# Patient Record
Sex: MALE | Race: BLACK OR AFRICAN AMERICAN | Employment: OTHER | URBAN - METROPOLITAN AREA
[De-identification: names, ages, dates, MRNs, and addresses within clinical notes are randomized per-mention and may not be internally consistent; named-entity substitution may affect disease eponyms.]

---

## 2017-01-22 PROBLEM — R80.9 MICROALBUMINURIA: Status: ACTIVE | Noted: 2017-01-22

## 2017-02-08 DIAGNOSIS — M54.40 CHRONIC LOW BACK PAIN WITH SCIATICA, SCIATICA LATERALITY UNSPECIFIED, UNSPECIFIED BACK PAIN LATERALITY: ICD-10-CM

## 2017-02-08 DIAGNOSIS — G89.29 CHRONIC LOW BACK PAIN WITH SCIATICA, SCIATICA LATERALITY UNSPECIFIED, UNSPECIFIED BACK PAIN LATERALITY: ICD-10-CM

## 2017-02-08 DIAGNOSIS — M48.061 SPINAL STENOSIS OF LUMBAR REGION: ICD-10-CM

## 2017-02-08 RX ORDER — GABAPENTIN 600 MG/1
TABLET ORAL
Qty: 90 TABLET | Refills: 2 | Status: SHIPPED | OUTPATIENT
Start: 2017-02-08 | End: 2017-05-01 | Stop reason: SDUPTHER

## 2017-03-03 ENCOUNTER — TELEPHONE (OUTPATIENT)
Dept: INTERNAL MEDICINE CLINIC | Age: 67
End: 2017-03-03

## 2017-07-06 PROBLEM — I96 NECROSIS OF TOE (HCC): Status: ACTIVE | Noted: 2017-07-06

## 2017-07-11 ENCOUNTER — TELEPHONE (OUTPATIENT)
Dept: CARDIOLOGY CLINIC | Age: 67
End: 2017-07-11

## 2017-07-11 ENCOUNTER — HOSPITAL ENCOUNTER (OUTPATIENT)
Dept: GENERAL RADIOLOGY | Age: 67
Discharge: HOME | End: 2017-07-11
Attending: INTERNAL MEDICINE | Admitting: INTERNAL MEDICINE

## 2017-07-11 DIAGNOSIS — Z01.811 PRE-OP CHEST EXAM: ICD-10-CM

## 2017-07-11 LAB
ANION GAP SERPL CALCULATED.3IONS-SCNC: 10 MMOL/L (ref 4–16)
APTT: 34.1 SECONDS (ref 21.2–33)
BUN BLDV-MCNC: 27 MG/DL (ref 6–23)
CALCIUM SERPL-MCNC: 8.4 MG/DL (ref 8.3–10.6)
CHLORIDE BLD-SCNC: 95 MMOL/L (ref 99–110)
CO2: 32 MMOL/L (ref 21–32)
CREAT SERPL-MCNC: 1.4 MG/DL (ref 0.9–1.3)
GFR AFRICAN AMERICAN: >60 ML/MIN/1.73M2
GFR NON-AFRICAN AMERICAN: 51 ML/MIN/1.73M2
GLUCOSE BLD-MCNC: 189 MG/DL (ref 70–140)
HCT VFR BLD CALC: 32.9 % (ref 42–52)
HEMOGLOBIN: 10.7 GM/DL (ref 13.5–18)
INR BLD: 1 INDEX
MCH RBC QN AUTO: 30.5 PG (ref 27–31)
MCHC RBC AUTO-ENTMCNC: 32.5 % (ref 32–36)
MCV RBC AUTO: 93.7 FL (ref 78–100)
PDW BLD-RTO: 12.8 % (ref 11.7–14.9)
PLATELET # BLD: 238 K/CU MM (ref 140–440)
PMV BLD AUTO: 9.9 FL (ref 7.5–11.1)
POTASSIUM SERPL-SCNC: 4.1 MMOL/L (ref 3.5–5.1)
PROTHROMBIN TIME: 11.4 SECONDS (ref 9.12–12.5)
RBC # BLD: 3.51 M/CU MM (ref 4.6–6.2)
SODIUM BLD-SCNC: 137 MMOL/L (ref 135–145)
WBC # BLD: 9.3 K/CU MM (ref 4–10.5)

## 2017-07-18 RX ORDER — CLOPIDOGREL BISULFATE 75 MG/1
75 TABLET ORAL DAILY
Qty: 30 TABLET | Refills: 11 | Status: SHIPPED | OUTPATIENT
Start: 2017-07-18

## 2017-07-25 ENCOUNTER — OFFICE VISIT (OUTPATIENT)
Dept: CARDIOLOGY CLINIC | Age: 67
End: 2017-07-25

## 2017-07-25 VITALS
DIASTOLIC BLOOD PRESSURE: 80 MMHG | HEART RATE: 64 BPM | BODY MASS INDEX: 35.89 KG/M2 | HEIGHT: 72 IN | WEIGHT: 265 LBS | SYSTOLIC BLOOD PRESSURE: 130 MMHG

## 2017-07-25 DIAGNOSIS — I73.9 PAD (PERIPHERAL ARTERY DISEASE) (HCC): Primary | ICD-10-CM

## 2017-07-25 PROCEDURE — 99214 OFFICE O/P EST MOD 30 MIN: CPT | Performed by: INTERNAL MEDICINE

## 2017-07-26 ENCOUNTER — OFFICE VISIT (OUTPATIENT)
Dept: BARIATRICS/WEIGHT MGMT | Age: 67
End: 2017-07-26

## 2017-07-26 VITALS
SYSTOLIC BLOOD PRESSURE: 145 MMHG | HEART RATE: 61 BPM | HEIGHT: 72 IN | DIASTOLIC BLOOD PRESSURE: 90 MMHG | WEIGHT: 260 LBS | RESPIRATION RATE: 20 BRPM | BODY MASS INDEX: 35.21 KG/M2

## 2017-07-26 DIAGNOSIS — I73.9 PVD (PERIPHERAL VASCULAR DISEASE) (HCC): Primary | ICD-10-CM

## 2017-07-26 DIAGNOSIS — I96 TOE GANGRENE (HCC): ICD-10-CM

## 2017-07-26 PROCEDURE — 99214 OFFICE O/P EST MOD 30 MIN: CPT | Performed by: SURGERY

## 2017-07-26 RX ORDER — TRAMADOL HYDROCHLORIDE 50 MG/1
50 TABLET ORAL EVERY 6 HOURS PRN
Qty: 35 TABLET | Refills: 0 | Status: SHIPPED | OUTPATIENT
Start: 2017-07-26 | End: 2017-08-05

## 2017-07-26 ASSESSMENT — ENCOUNTER SYMPTOMS
COUGH: 0
VOMITING: 0
DIARRHEA: 0
TROUBLE SWALLOWING: 0
BLOOD IN STOOL: 0
ABDOMINAL PAIN: 0
VOICE CHANGE: 0
ANAL BLEEDING: 0
WHEEZING: 0
SHORTNESS OF BREATH: 0
CONSTIPATION: 0
NAUSEA: 0
SORE THROAT: 0
COLOR CHANGE: 1
PHOTOPHOBIA: 0

## 2017-09-07 ENCOUNTER — OFFICE VISIT (OUTPATIENT)
Dept: SURGERY | Age: 67
End: 2017-09-07

## 2017-09-07 ENCOUNTER — TELEPHONE (OUTPATIENT)
Dept: SURGERY | Age: 67
End: 2017-09-07

## 2017-09-07 VITALS
BODY MASS INDEX: 36.43 KG/M2 | WEIGHT: 268.96 LBS | SYSTOLIC BLOOD PRESSURE: 136 MMHG | DIASTOLIC BLOOD PRESSURE: 81 MMHG | HEART RATE: 89 BPM | HEIGHT: 72 IN

## 2017-09-07 DIAGNOSIS — E11.52 DIABETIC WET GANGRENE OF THE FOOT (HCC): Primary | ICD-10-CM

## 2017-09-07 PROCEDURE — 99214 OFFICE O/P EST MOD 30 MIN: CPT | Performed by: SURGERY

## 2017-09-07 ASSESSMENT — ENCOUNTER SYMPTOMS
RECTAL PAIN: 0
EYE ITCHING: 0
CHOKING: 0
CONSTIPATION: 0
APNEA: 0
BACK PAIN: 0
ANAL BLEEDING: 0
EYE REDNESS: 0
COLOR CHANGE: 0
STRIDOR: 0
SORE THROAT: 0
PHOTOPHOBIA: 0

## 2017-09-08 ENCOUNTER — TELEPHONE (OUTPATIENT)
Dept: CARDIOLOGY CLINIC | Age: 67
End: 2017-09-08

## 2017-09-11 ENCOUNTER — TELEPHONE (OUTPATIENT)
Dept: CARDIOLOGY CLINIC | Age: 67
End: 2017-09-11

## 2017-09-11 ENCOUNTER — HOSPITAL ENCOUNTER (OUTPATIENT)
Dept: PREADMISSION TESTING | Age: 67
Discharge: HOME | End: 2017-09-11
Attending: SURGERY | Admitting: SURGERY

## 2017-09-11 ENCOUNTER — PROCEDURE VISIT (OUTPATIENT)
Dept: CARDIOLOGY CLINIC | Age: 67
End: 2017-09-11

## 2017-09-11 VITALS — DIASTOLIC BLOOD PRESSURE: 76 MMHG | HEART RATE: 62 BPM | SYSTOLIC BLOOD PRESSURE: 134 MMHG

## 2017-09-11 VITALS
RESPIRATION RATE: 18 BRPM | TEMPERATURE: 97.9 F | DIASTOLIC BLOOD PRESSURE: 72 MMHG | HEART RATE: 70 BPM | BODY MASS INDEX: 35.65 KG/M2 | SYSTOLIC BLOOD PRESSURE: 152 MMHG | WEIGHT: 269 LBS | OXYGEN SATURATION: 95 % | HEIGHT: 73 IN

## 2017-09-11 DIAGNOSIS — Z95.810 ICD (IMPLANTABLE CARDIOVERTER-DEFIBRILLATOR), DUAL, IN SITU: Primary | ICD-10-CM

## 2017-09-11 LAB
ANION GAP SERPL CALCULATED.3IONS-SCNC: 10 MMOL/L (ref 4–16)
BUN BLDV-MCNC: 14 MG/DL (ref 6–23)
CALCIUM SERPL-MCNC: 8.7 MG/DL (ref 8.3–10.6)
CHLORIDE BLD-SCNC: 101 MMOL/L (ref 99–110)
CO2: 29 MMOL/L (ref 21–32)
CREAT SERPL-MCNC: 1.4 MG/DL (ref 0.9–1.3)
EKG ATRIAL RATE: 67 BPM
EKG DIAGNOSIS: NORMAL
EKG P AXIS: 85 DEGREES
EKG P-R INTERVAL: 186 MS
EKG Q-T INTERVAL: 422 MS
EKG QRS DURATION: 94 MS
EKG QTC CALCULATION (BAZETT): 445 MS
EKG R AXIS: 16 DEGREES
EKG T AXIS: 141 DEGREES
EKG VENTRICULAR RATE: 67 BPM
GFR AFRICAN AMERICAN: >60 ML/MIN/1.73M2
GFR NON-AFRICAN AMERICAN: 51 ML/MIN/1.73M2
GLUCOSE BLD-MCNC: 102 MG/DL (ref 70–140)
HCT VFR BLD CALC: 38.1 % (ref 42–52)
HEMOGLOBIN: 12.3 GM/DL (ref 13.5–18)
MCH RBC QN AUTO: 30.4 PG (ref 27–31)
MCHC RBC AUTO-ENTMCNC: 32.3 % (ref 32–36)
MCV RBC AUTO: 94.1 FL (ref 78–100)
PDW BLD-RTO: 12.5 % (ref 11.7–14.9)
PLATELET # BLD: 188 K/CU MM (ref 140–440)
PMV BLD AUTO: 9.4 FL (ref 7.5–11.1)
POTASSIUM SERPL-SCNC: 4.4 MMOL/L (ref 3.5–5.1)
RBC # BLD: 4.05 M/CU MM (ref 4.6–6.2)
SODIUM BLD-SCNC: 140 MMOL/L (ref 135–145)
WBC # BLD: 8.9 K/CU MM (ref 4–10.5)

## 2017-09-11 PROCEDURE — 93294 REM INTERROG EVL PM/LDLS PM: CPT | Performed by: INTERNAL MEDICINE

## 2017-09-11 PROCEDURE — 93296 REM INTERROG EVL PM/IDS: CPT | Performed by: INTERNAL MEDICINE

## 2017-09-11 ASSESSMENT — PAIN DESCRIPTION - FREQUENCY: FREQUENCY: CONTINUOUS

## 2017-09-11 ASSESSMENT — PAIN DESCRIPTION - PAIN TYPE: TYPE: CHRONIC PAIN

## 2017-09-11 ASSESSMENT — PAIN DESCRIPTION - LOCATION: LOCATION: LEG

## 2017-09-11 ASSESSMENT — PAIN DESCRIPTION - ONSET: ONSET: ON-GOING

## 2017-09-11 ASSESSMENT — PAIN DESCRIPTION - DESCRIPTORS: DESCRIPTORS: ACHING;SHARP

## 2017-09-11 ASSESSMENT — PAIN SCALES - GENERAL: PAINLEVEL_OUTOF10: 8

## 2017-09-11 ASSESSMENT — PAIN DESCRIPTION - ORIENTATION: ORIENTATION: RIGHT

## 2017-09-15 ENCOUNTER — TELEPHONE (OUTPATIENT)
Dept: CARDIOLOGY CLINIC | Age: 67
End: 2017-09-15

## 2017-09-25 ENCOUNTER — OFFICE VISIT (OUTPATIENT)
Dept: SURGERY | Age: 67
End: 2017-09-25

## 2017-09-25 VITALS
WEIGHT: 268.08 LBS | SYSTOLIC BLOOD PRESSURE: 143 MMHG | HEART RATE: 78 BPM | BODY MASS INDEX: 36.31 KG/M2 | HEIGHT: 72 IN | DIASTOLIC BLOOD PRESSURE: 67 MMHG

## 2017-09-25 DIAGNOSIS — E11.52 DIABETIC WET GANGRENE OF THE FOOT (HCC): Primary | ICD-10-CM

## 2017-09-25 PROCEDURE — 99024 POSTOP FOLLOW-UP VISIT: CPT | Performed by: SURGERY

## 2017-10-02 ENCOUNTER — OFFICE VISIT (OUTPATIENT)
Dept: SURGERY | Age: 67
End: 2017-10-02

## 2017-10-02 VITALS
SYSTOLIC BLOOD PRESSURE: 137 MMHG | HEIGHT: 72 IN | WEIGHT: 268.08 LBS | HEART RATE: 80 BPM | DIASTOLIC BLOOD PRESSURE: 79 MMHG | BODY MASS INDEX: 36.31 KG/M2

## 2017-10-02 DIAGNOSIS — E11.52 DIABETIC WET GANGRENE OF THE FOOT (HCC): Primary | ICD-10-CM

## 2017-10-02 PROCEDURE — 99024 POSTOP FOLLOW-UP VISIT: CPT | Performed by: SURGERY

## 2017-10-02 NOTE — MR AVS SNAPSHOT
After Visit Summary             Wing Cynthia   10/2/2017 10:00 AM   Office Visit    Description:  Male : 1950   Provider:  Bandar Weinberg MD   Department:  [id number] and Future Appointments         Below is a list of your follow-up and future appointments. This may not be a complete list as you may have made appointments directly with providers that we are not aware of or your providers may have made some for you. Please call your providers to confirm appointments. It is important to keep your appointments. Please bring your current insurance card, photo ID, co-pay, and all medication bottles to your appointment. If self-pay, payment is expected at the time of service. Your To-Do List     Future Appointments Provider Department Dept Phone    10/3/2017 4:10 PM Adolfo Love MD Cardiology [facility] 104 [phone]    Please arrive 15 minutes prior to appointment, bring photo ID and insurance card. 10/6/2017 10:45 AM Ryan NUNEZ MD [facility]  [phone]    Please arrive 15 minutes prior to appointment time, bring insurance card and photo ID.     10/16/2017 10:45 AM MD Yoselin Jeffery 16 [phone]    10/25/2017 12:00 PM Rukhsana Layne MD Cardiology [facility] 104 [phone]    Please arrive 15 minutes prior to appointment, bring photo ID and insurance card.     2017 2:40 PM SCHEDULE, [address] Cardiology [facility] 104 [phone]         Information from Your Visit        Department     Name Address Phone Fax    Yoselin Powell 16 [address] [facility] [phone]      Vital Signs     Blood Pressure Pulse Height Weight Body Mass Index Smoking Status    137/79 80 6' 0.05\" (1.83 m) 268 lb 1.3 oz (121.6 kg) 36.31 kg/m2 Former Smoker      Additional Information about your Body Mass Index (BMI) Your BMI as listed above is considered obese (30 or more). BMI is an estimate of body fat, calculated from your height and weight. The higher your BMI, the greater your risk of heart disease, high blood pressure, type 2 diabetes, stroke, gallstones, arthritis, sleep apnea, and certain cancers. BMI is not perfect. It may overestimate body fat in athletes and people who are more muscular. Even a small weight loss (between 5 and 10 percent of your current weight) by decreasing your calorie intake and becoming more physically active will help lower your risk of developing or worsening diseases associated with obesity.      Learn more at: [employer]             Medications and Orders      Your Current Medications Are              OXYGEN Inhale into the lungs Uses with exertional activities    tiotropium (SPIRIVA RESPIMAT) 1.25 MCG/ACT AERS inhaler Inhale 2 puffs into the lungs daily as needed States usually uses twice a week    clopidogrel (PLAVIX) 75 MG tablet Take 1 tablet by mouth daily    traMADol (ULTRAM) 50 MG tablet Take 1 tablet by mouth every 6 hours as needed for Pain    albuterol sulfate HFA (VENTOLIN HFA) 108 (90 Base) MCG/ACT inhaler Inhale 2 puffs into the lungs every 4 hours as needed for Wheezing    furosemide (LASIX) 40 MG tablet Take 40 mg by mouth daily    gabapentin (NEURONTIN) 600 MG tablet Take 600 mg by mouth nightly    insulin lispro (HUMALOG KWIKPEN) 100 UNIT/ML pen Inject into the skin 3 times daily (before meals) Sliding scale coverage for meals    DULoxetine (CYMBALTA) 30 MG extended release capsule Take 1 capsule by mouth daily    carvedilol (COREG) 25 MG tablet Take 1 tablet by mouth 2 times daily    hydrochlorothiazide (HYDRODIURIL) 25 MG tablet Take 1 tablet by mouth daily    NIFEdipine (PROCARDIA XL) 60 MG extended release tablet Take 1 tablet by mouth nightly    insulin glargine (LANTUS SOLOSTAR) 100 UNIT/ML injection pen Inject 40

## 2017-10-03 ENCOUNTER — OFFICE VISIT (OUTPATIENT)
Dept: CARDIOLOGY CLINIC | Age: 67
End: 2017-10-03

## 2017-10-03 VITALS
HEIGHT: 72 IN | HEART RATE: 68 BPM | SYSTOLIC BLOOD PRESSURE: 128 MMHG | WEIGHT: 267 LBS | DIASTOLIC BLOOD PRESSURE: 74 MMHG | BODY MASS INDEX: 36.16 KG/M2

## 2017-10-03 DIAGNOSIS — I73.9 PAD (PERIPHERAL ARTERY DISEASE) (HCC): Primary | ICD-10-CM

## 2017-10-03 DIAGNOSIS — M79.89 LEG SWELLING: ICD-10-CM

## 2017-10-03 PROCEDURE — 99214 OFFICE O/P EST MOD 30 MIN: CPT | Performed by: INTERNAL MEDICINE

## 2017-10-03 NOTE — PROGRESS NOTES
Ceasar Mercer MD        OFFICE  FOLLOWUP NOTE    Chief complaints: patient is here for management of  PAD and rt toe gangrene, s/p rt 3rd toe amputation  Subjective: patient feels better, no chest pain, no shortness of breath, no dizziness, no palpitations    HPI Barron Corley is a 79 y. o.year old who  has a past medical history of Acid reflux; Acute MI ([facility] [address] Arthritis; Broken teeth; CAD (coronary artery disease); Cardiomyopathy [facility]); CHF (congestive heart failure) ([facility] [address] Chronic back pain; Chronic kidney disease; Diabetes mellitus ([facility] [address] Diabetic neuropathy ([facility] [address] H/O cardiovascular stress test; History of irregular heartbeat; History of syncope; Hyperlipidemia; Hypertension; Leg swelling; Necrotic toes ([facility] [address] Neuropathy ([facility] [address] PVD (peripheral vascular disease) ([facility] [address] Sick sinus syndrome ([facility] [address] Sleep apnea; Spinal stenosis; and Teeth missing. and presents for management of  PAD and rt toe gangrene, he had rt 3rd toe amputation and his wound is healing, however he still has dry gangrene of rt great toe and rt little toe.     Current Outpatient Prescriptions   Medication Sig Dispense Refill    OXYGEN Inhale into the lungs as needed Uses with exertional activities       tiotropium (SPIRIVA RESPIMAT) 1.25 MCG/ACT AERS inhaler Inhale 2 puffs into the lungs daily as needed States usually uses twice a week      clopidogrel (PLAVIX) 75 MG tablet Take 1 tablet by mouth daily 30 tablet 11    traMADol (ULTRAM) 50 MG tablet Take 1 tablet by mouth every 6 hours as needed for Pain 30 tablet 0    albuterol sulfate HFA (VENTOLIN HFA) 108 (90 Base) MCG/ACT inhaler Inhale 2 puffs into the lungs every 4 hours as needed for Wheezing 1 Inhaler 3    furosemide (LASIX) 40 MG tablet Take 40 mg by mouth daily      gabapentin (NEURONTIN) 600 MG tablet Take 600 mg by mouth nightly      insulin lispro (HUMALOG KWIKPEN) 100 UNIT/ML pen Inject into the skin 3 times daily (before meals) Sliding scale coverage for meals      DULoxetine (CYMBALTA) 30 MG extended release capsule Take 1 capsule by mouth daily 30 capsule 3    carvedilol (COREG) 25 MG tablet Take 1 tablet by mouth 2 times daily 60 tablet 3    hydrochlorothiazide (HYDRODIURIL) 25 MG tablet Take 1 tablet by mouth daily 30 tablet 1    NIFEdipine (PROCARDIA XL) 60 MG extended release tablet Take 1 tablet by mouth nightly 30 tablet 3    insulin glargine (LANTUS SOLOSTAR) 100 UNIT/ML injection pen Inject 40 Units into the skin nightly (Patient taking differently: Inject 45 Units into the skin nightly ) 5 Pen 3     No current facility-administered medications for this visit.       Allergies: Pcn [penicillins] and Fentanyl  Past Medical History:   Diagnosis Date    Acid reflux     Acute MI ([facility] [address] 2004, 2008    Arthritis     Back    Broken teeth     Upper Front    CAD (coronary artery disease)     Sees Dr. Dickson [facility])     per old chart    CHF (congestive heart failure) ([facility]     Chronic back pain     Chronic kidney disease     STAGE 3 KIDNEY FAILURE- \"from my diabetes- do not follow with any one- have seen Dr Wolfgang Jensen in the past\"    Diabetes mellitus ([facility] D[facility] 1965    per old chart pt has been diabetic since age 13    Diabetic neuropathy ([facility]     \"in my feet\"    H/O cardiovascular stress test 08/25/2016    History of irregular heartbeat     History of syncope     per old chart pt had hx syncope and dizziness for multiple yrs so ICD placed    Hyperlipidemia     Hypertension     Leg swelling     bilat---up to thighs---reduces at times with lying down    Necrotic toes (HCC)     toe gangrene    Neuropathy (HCC)     both feet    PVD (peripheral vascular disease) ([facility] [address]     Sick sinus syndrome ([facility] [address]     Sleep apnea     \"sleep study 3 yrs ago- could not tolerate the cpap made me to dry\"    Spinal stenosis     Teeth missing     Upper And Lower     Past Surgical History:   Procedure Laterality Date    CARDIAC CATHETERIZATION      per old chart done 10/2014    CARDIAC CATHETERIZATION  07/14/2017    with angiography of leg    CARDIAC DEFIBRILLATOR PLACEMENT  06/04/2010    [employer] [address] Defibrillator Implanted    COLECTOMY Right 08/26/2016    laparascopic; robotic assisted    COLONOSCOPY  08/04/2016    CORONARY ANGIOPLASTY      \"15 Heart Stents\"    CORONARY ANGIOPLASTY WITH STENT PLACEMENT      per old chart had angio with stent to circumflex and obtuse marginal artery at [facility] 5/2010( old chart also gives hx of stent placement done 2000,2004 and 2005)   [address]      Teeth Extracted In Past    PACEMAKER PLACEMENT  06/04/2010    [employer] [address] Defibrillator Implanted    VASCULAR SURGERY      per old chart had balloon angioplasty right superfical femoral artery,right popliteal artery,,right ant.tibial artery, right tibioperoneal trunk, and right post.tibial artery wna stent placement right popliteal artery and superfical femoral artery 7/2012     Family History   Problem Relation Age of Onset    Diabetes Mother     Stroke Mother     High Blood Pressure Mother     Vision Loss Mother     Cancer Father      Prostate Cancer    Diabetes Sister     Neuropathy Sister     Other Sister      \"Breathing Problems\"    Heart Disease Sister     Early Death Sister 62     Heart Complications    Cancer Brother      \"Stomach Cancer\"    High Blood Pressure Brother     Diabetes Brother     Heart Disease Brother     High Blood Pressure Brother     Cancer Son      \"Testicle Cancer\"     Social History   Substance Use Topics    Smoking status: Former Smoker     Packs/day: 0.50     Years: 36.00     Types: Cigarettes     Start date: 1980    Smokeless tobacco: Never Used    Alcohol use No      [unfilled]  Review of Systems:   · Constitutional: No Fever or Weight Loss   · Eyes: No Decreased Vision  · ENT: No Headaches, Hearing Loss or Vertigo  · Cardiovascular: No chest pain, dyspnea on exertion, palpitations or loss of consciousness  · Respiratory: No cough or wheezing    · Gastrointestinal: No abdominal pain, appetite loss, blood in stools, constipation, diarrhea or heartburn  · Genitourinary: No dysuria, trouble voiding, or hematuria  · Musculoskeletal:  No gait disturbance, weakness or joint complaints  · Integumentary: No rash or pruritis  · Neurological: No TIA or stroke symptoms  · Psychiatric: No anxiety or depression  · Endocrine: No malaise, fatigue or temperature intolerance  · Hematologic/Lymphatic: No bleeding problems, blood clots or swollen lymph nodes  · Allergic/Immunologic: No nasal congestion or hives  All systems negative except as marked. Objective:  /74  Pulse 68  Ht 6' (1.829 m)  Wt 267 lb (121.1 kg)  BMI 36.21 kg/m2  Wt Readings from Last 3 Encounters:   10/03/17 267 lb (121.1 kg)   10/02/17 268 lb 1.3 oz (121.6 kg)   09/25/17 268 lb 1.3 oz (121.6 kg)     Body mass index is 36.21 kg/(m^2). GENERAL - Alert, oriented, pleasant, in no apparent distress,normal grooming  HEENT  pupils are reactive to light and accomodation, cornea intact, conjunctive normal color, ears are normal in exam,throat exam in normal, teeth, gum and palate are normal, oral mucosa is normal without any notation of pallor or cyanosis  Neck - Supple. No jugular venous distention noted. No carotid bruits, no apical -carotid delay  Respiratory:  Normal breath sounds, No respiratory distress, No wheezing, No chest tenderness. ,no use of accessory muscles, diaphragm movement is normal  Cardiovascular: (PMI) apex non displaced,no lifts no thrills, no s3,no s4, Normal heart rate, Normal rhythm, No murmurs, No rubs, No gallops.  Carotid arteries pulse and amplitude are normal no bruit, no abdominal bruit noted ( normal abdominal aorta ausculation), femoral arteries pulse and amplitude are normal no bruit, pedal pulses are normal  Femoral pulses have normal amplitude, no bruits   Extremities rt great toe gangrene and rt little toe doppler to follow up on the blood circulation for proper healing of the wound  2. Leg swelling: combination of sedentary life, nefidipine use and foot gangrene, will continue lasix, and as mentioned, case already discussed with Dr. Delfina Gonzalez and patient will see him tomorrow, if needed, will do venous doppler, doubt if he has DVT  3. Htn: CONTROLLED  Dm:recommend strict control. All labs, medications and tests reviewed, continue all other medications of all above medical condition listed as is.     @[facility]@

## 2017-10-03 NOTE — MR AVS SNAPSHOT
Return in about 4 weeks (around 10/31/2017). Information from Your Visit        Department     Name Address Phone Fax    Cardiology Liz Mata [address] [address] [phone]      You Were Seen for:         Comments    PAD (peripheral artery disease) ([facility]   [[id number]]         Vital Signs     Blood Pressure Pulse Height Weight Body Mass Index Smoking Status    128/74 68 6' (1.829 m) 267 lb (121.1 kg) 36.21 kg/m2 Former Smoker      Additional Information about your Body Mass Index (BMI)           Your BMI as listed above is considered obese (30 or more). BMI is an estimate of body fat, calculated from your height and weight. The higher your BMI, the greater your risk of heart disease, high blood pressure, type 2 diabetes, stroke, gallstones, arthritis, sleep apnea, and certain cancers. BMI is not perfect. It may overestimate body fat in athletes and people who are more muscular. Even a small weight loss (between 5 and 10 percent of your current weight) by decreasing your calorie intake and becoming more physically active will help lower your risk of developing or worsening diseases associated with obesity.      Learn more at: [employer]             Medications and Orders      Your Current Medications Are              OXYGEN Inhale into the lungs as needed Uses with exertional activities     tiotropium (SPIRIVA RESPIMAT) 1.25 MCG/ACT AERS inhaler Inhale 2 puffs into the lungs daily as needed States usually uses twice a week    clopidogrel (PLAVIX) 75 MG tablet Take 1 tablet by mouth daily    traMADol (ULTRAM) 50 MG tablet Take 1 tablet by mouth every 6 hours as needed for Pain    albuterol sulfate HFA (VENTOLIN HFA) 108 (90 Base) MCG/ACT inhaler Inhale 2 puffs into the lungs every 4 hours as needed for Wheezing    furosemide (LASIX) 40 MG tablet Take 40 mg by mouth daily gabapentin (NEURONTIN) 600 MG tablet Take 600 mg by mouth nightly    insulin lispro (HUMALOG KWIKPEN) 100 UNIT/ML pen Inject into the skin 3 times daily (before meals) Sliding scale coverage for meals    DULoxetine (CYMBALTA) 30 MG extended release capsule Take 1 capsule by mouth daily    carvedilol (COREG) 25 MG tablet Take 1 tablet by mouth 2 times daily    hydrochlorothiazide (HYDRODIURIL) 25 MG tablet Take 1 tablet by mouth daily    NIFEdipine (PROCARDIA XL) 60 MG extended release tablet Take 1 tablet by mouth nightly    insulin glargine (LANTUS SOLOSTAR) 100 UNIT/ML injection pen Inject 40 Units into the skin nightly      Allergies              Pcn [Penicillins] Hives    Fentanyl Itching         Additional Information        Basic Information     Date Of Birth Sex Race Ethnicity Preferred Language    1950 Male Black Non-/Non  English      Problem List as of 10/3/2017  Date Reviewed: 7/26/2017                Diabetic foot infection ([address]    Toe gangrene ([facility]    PVD (peripheral vascular disease) ([facility] [address]    Limb ischemia    Necrotic toes (HCC)    Microalbuminuria    Tubulovillous adenoma of colon    Postoperative hypertension    S/P partial colectomy    Colon cancer screening    Chronic coronary artery disease    Chronic kidney disease, stage III (moderate)    Sick sinus syndrome (HCC)    Type 2 diabetes mellitus with diabetic polyneuropathy (HCC)    Spinal stenosis of lumbar region    Obesity, Class I, BMI 30-34.9    Tobacco dependence    Chronic combined systolic and diastolic heart failure (HCC)    Diabetic neuropathy (HCC)    Automatic implantable cardioverter-defibrillator in situ    Benign essential HTN    Mixed hyperlipidemia    PAD (peripheral artery disease) ([facility] [address]      Immunizations as of 10/3/2017     Name Date    Influenza Whole 1/4/2016    Pneumococcal 13-valent Conjugate ([id number]) 5/31/2016    Pneumococcal Polysaccharide ([id number]) 7/10/2017      Preventive Care Date Due    Hepatitis C screening is recommended for all adults regardless of risk factors born between [facility] at least once (lifetime) who have never been tested. 1950    Diabetic Foot Exam 9/21/1960    Eye Exam By An Eye Doctor 9/21/1960    Tetanus Combination Vaccine (1 - Tdap) 9/21/1969    Colonoscopy 9/21/2000    Zoster Vaccine 9/21/2010    Cholesterol Screening 1/25/2017    Urine Check For Kidney Problems 8/19/2017    Yearly Flu Vaccine (1) 9/1/2017    Hemoglobin A1C (Test For Long-Term Glucose Control) 7/7/2018            MyChart Signup           Our records indicate that you have declined MyChart signup.

## 2017-10-06 PROBLEM — N18.31 CHRONIC KIDNEY DISEASE (CKD) STAGE G3A/A2, MODERATELY DECREASED GLOMERULAR FILTRATION RATE (GFR) BETWEEN 45-59 ML/MIN/1.73 SQUARE METER AND ALBUMINURIA CREATININE RATIO BETWEEN 30-299 MG/G (HCC): Status: ACTIVE | Noted: 2017-10-06

## 2017-10-06 PROBLEM — E11.9 DM (DIABETES MELLITUS) (HCC): Status: ACTIVE | Noted: 2017-10-06

## 2017-10-06 PROBLEM — I10 HTN (HYPERTENSION): Status: ACTIVE | Noted: 2017-10-06

## 2017-10-06 PROBLEM — R60.9 EDEMA: Status: ACTIVE | Noted: 2017-10-06

## 2017-10-11 ENCOUNTER — NURSE ONLY (OUTPATIENT)
Dept: CARDIOLOGY CLINIC | Age: 67
End: 2017-10-11

## 2017-10-11 ENCOUNTER — PROCEDURE VISIT (OUTPATIENT)
Dept: CARDIOLOGY CLINIC | Age: 67
End: 2017-10-11

## 2017-10-11 ENCOUNTER — OFFICE VISIT (OUTPATIENT)
Dept: CARDIOLOGY CLINIC | Age: 67
End: 2017-10-11

## 2017-10-11 VITALS — SYSTOLIC BLOOD PRESSURE: 138 MMHG | HEART RATE: 60 BPM | DIASTOLIC BLOOD PRESSURE: 68 MMHG

## 2017-10-11 VITALS
SYSTOLIC BLOOD PRESSURE: 132 MMHG | HEART RATE: 62 BPM | RESPIRATION RATE: 16 BRPM | DIASTOLIC BLOOD PRESSURE: 72 MMHG | BODY MASS INDEX: 36.3 KG/M2 | HEIGHT: 72 IN | WEIGHT: 268 LBS

## 2017-10-11 DIAGNOSIS — Z45.02 ICD (IMPLANTABLE CARDIOVERTER-DEFIBRILLATOR) BATTERY DEPLETION: Primary | ICD-10-CM

## 2017-10-11 DIAGNOSIS — N18.3 DIABETES MELLITUS DUE TO UNDERLYING CONDITION WITH STAGE 3 CHRONIC KIDNEY DISEASE, UNSPECIFIED LONG TERM INSULIN USE STATUS: ICD-10-CM

## 2017-10-11 DIAGNOSIS — E08.22 DIABETES MELLITUS DUE TO UNDERLYING CONDITION WITH STAGE 3 CHRONIC KIDNEY DISEASE, UNSPECIFIED LONG TERM INSULIN USE STATUS: ICD-10-CM

## 2017-10-11 DIAGNOSIS — E78.2 MIXED HYPERLIPIDEMIA: ICD-10-CM

## 2017-10-11 DIAGNOSIS — M79.89 LEG SWELLING: ICD-10-CM

## 2017-10-11 DIAGNOSIS — R07.9 CHEST PAIN, UNSPECIFIED TYPE: Primary | ICD-10-CM

## 2017-10-11 DIAGNOSIS — I73.9 CLAUDICATION (HCC): Primary | ICD-10-CM

## 2017-10-11 DIAGNOSIS — I10 BENIGN ESSENTIAL HTN: ICD-10-CM

## 2017-10-11 DIAGNOSIS — I73.9 PAD (PERIPHERAL ARTERY DISEASE) (HCC): ICD-10-CM

## 2017-10-11 DIAGNOSIS — N18.31 CHRONIC KIDNEY DISEASE (CKD) STAGE G3A/A2, MODERATELY DECREASED GLOMERULAR FILTRATION RATE (GFR) BETWEEN 45-59 ML/MIN/1.73 SQUARE METER AND ALBUMINURIA CREATININE RATIO BETWEEN 30-299 MG/G (HCC): ICD-10-CM

## 2017-10-11 DIAGNOSIS — M79.89 LEG SWELLING: Primary | ICD-10-CM

## 2017-10-11 DIAGNOSIS — I10 ESSENTIAL HYPERTENSION: ICD-10-CM

## 2017-10-11 DIAGNOSIS — R06.02 SOB (SHORTNESS OF BREATH): ICD-10-CM

## 2017-10-11 DIAGNOSIS — N18.30 CHRONIC KIDNEY DISEASE, STAGE III (MODERATE) (HCC): ICD-10-CM

## 2017-10-11 PROCEDURE — 93922 UPR/L XTREMITY ART 2 LEVELS: CPT | Performed by: INTERNAL MEDICINE

## 2017-10-11 PROCEDURE — 93926 LOWER EXTREMITY STUDY: CPT | Performed by: INTERNAL MEDICINE

## 2017-10-11 PROCEDURE — 93000 ELECTROCARDIOGRAM COMPLETE: CPT | Performed by: INTERNAL MEDICINE

## 2017-10-11 PROCEDURE — 99215 OFFICE O/P EST HI 40 MIN: CPT | Performed by: INTERNAL MEDICINE

## 2017-10-11 PROCEDURE — 93971 EXTREMITY STUDY: CPT | Performed by: INTERNAL MEDICINE

## 2017-10-11 NOTE — PROGRESS NOTES
Patient here in office and educated on ICD battery change, schedule for 10/27/2017 @ 1000, with arrival @ 0800, @ [facility]; risk explained; and consents signed. Also copy of orders given for labs and CXR due 10/26/2017 at [facility]. Instruction given to patient to :  NPO after midnight the night before procedure; call hospital at [phone] to pre-register. May take rest of morning meds of procedure  Patient voiced understanding. Copies of consent & info sheet given to [facility] for scanning.

## 2017-10-11 NOTE — PROGRESS NOTES
status: Former Smoker     Packs/day: 0.50     Years: 36.00     Types: Cigarettes     Start date: 1980    Smokeless tobacco: Never Used    Alcohol use No     Family history: family history includes Cancer in his brother, father, and son; Diabetes in his brother, mother, and sister; Early Death (age of onset: 62) in his sister; Heart Disease in his brother and sister; High Blood Pressure in his brother, brother, and mother; Neuropathy in his sister; Other in his sister; Stroke in his mother; Vision Loss in his mother. ALLERGIES:  Pcn [penicillins] and Fentanyl  Prior to Admission medications    Medication Sig Start Date End Date Taking? Authorizing Provider   acetaminophen (TYLENOL) 325 MG tablet Take 650 mg by mouth every 6 hours as needed for Pain   Yes Historical Provider, MD   HYDROcodone-acetaminophen (NORCO) 5-325 MG per tablet Take 1 tablet by mouth every 8 hours as needed for Pain .    Yes Historical Provider, MD   lisinopril-hydrochlorothiazide (PRINZIDE;ZESTORETIC) 20-12.5 MG per tablet Take 1 tablet by mouth daily 10/6/17  Yes Linda Fragoso MD   OXYGEN Inhale into the lungs as needed Uses with exertional activities    Yes Historical Provider, MD   tiotropium (SPIRIVA RESPIMAT) 1.25 MCG/ACT AERS inhaler Inhale 2 puffs into the lungs daily as needed States usually uses twice a week   Yes Historical Provider, MD   clopidogrel (PLAVIX) 75 MG tablet Take 1 tablet by mouth daily 7/18/17  Yes Mike Beverly MD   albuterol sulfate HFA (VENTOLIN HFA) 108 (90 Base) MCG/ACT inhaler Inhale 2 puffs into the lungs every 4 hours as needed for Wheezing 7/10/17  Yes Gracy Lefort, MD   furosemide (LASIX) 40 MG tablet Take 40 mg by mouth daily 6/5/17  Yes Historical Provider, MD   gabapentin (NEURONTIN) 600 MG tablet Take 600 mg by mouth nightly   Yes Historical Provider, MD   insulin lispro (HUMALOG KWIKPEN) 100 UNIT/ML pen Inject into the skin 3 times daily (before meals) Sliding scale coverage for meals   Yes Historical Provider, MD   DULoxetine (CYMBALTA) 30 MG extended release capsule Take 1 capsule by mouth daily 10/20/16  Yes Katarina Eugene MD   carvedilol (COREG) 25 MG tablet Take 1 tablet by mouth 2 times daily 9/30/16  Yes Katarina Eugene MD   hydrochlorothiazide (HYDRODIURIL) 25 MG tablet Take 1 tablet by mouth daily 9/30/16  Yes Katarina Eugene MD   NIFEdipine (PROCARDIA XL) 60 MG extended release tablet Take 1 tablet by mouth nightly 9/30/16  Yes Katarina Eugene MD   insulin glargine (LANTUS SOLOSTAR) 100 UNIT/ML injection pen Inject 40 Units into the skin nightly  Patient taking differently: Inject 45 Units into the skin nightly  9/30/16  Yes Katarina Eugene MD     Constitutional:  /72 (Site: Right Arm, Position: Sitting, Cuff Size: Large Adult)   Pulse 62   Resp 16   Ht 6' (1.829 m)   Wt 268 lb (121.6 kg)   BMI 36.35 kg/m²    Body mass index is 36.35 kg/m². Wt Readings from Last 3 Encounters:   10/11/17 268 lb (121.6 kg)   10/06/17 267 lb (121.1 kg)   10/03/17 267 lb (121.1 kg)     General exam: Moderately obese in a wheelchair alert awake oriented in no distress. Head and Neck: Normocephalic. No thyromegaly. Neck is supple. .    Carotids: No bruits noted   Jugular venous pressure: Not elevated. Heart[de-identified]  Number wasn't to the hospital without any murmurs or gallops  Peripheral Pulses: Right foot is in the dry dressing  Extremities: 1+ edema noted in both legs  Chest: Left-sided ICD is intact  Lungs: Minimal inspiratory rhonchi noted in the right lung base of  Abdomen: Soft non tender. Bowel sounds are normal. No organomegaly or ascites. Musculoskeletal: WNL  Skin: Normal in color and texture. No rash  Psychiatric: Normal mood and effect. Neurologic exam:  No focal deficit    ECG done this morning showed atrial paced rhythm with first-degree heart block and old inferior wall infarction. Uterine inversion noted in lateral lead system ischemia unchanged compared to previous EKG on September 11.       ICD

## 2017-10-11 NOTE — LETTER
Austin Chavira Neither     PROCEDURE: Generator Change Dual chamber Implantable Cardioverter Defibrillator    Date of Procedure: 10/27/2017 Time: 1000 Arrival Time: 0800    Patient Name: Jannet Georges   : 1950   MRN# [id number]      HOSPITAL: [facility])    Call to Pre-Canvas at [phone]  2 days before your procedure      X Please have blood work and chest-x-ray done 10/25/2017 at [facility]   X Please do not have anything by mouth after midnight prior to or 8 hours before the procedure. X You may take your medications with a sip of water in the morning before your procedure or take them with you unless listed below. If taking COUMADIN it should be held 2 days prior to the procedure if INR is over 2.5. If INR is less than 2.5 then continue Coumadin. Call the Office @ [phone] after the blood work is completed for results and Instructions. ANTICOUGLATION:Hold Xarelto the day before and the morning of the procedure. Hold Metformin 24 hours before the procedure until you may restart metformin 2  days after the procedure on       Patient Signature: _________________________________  Staff: [address] [place] [address] Informed Consent for Anesthesia/Sedation, Surgery, Invasive Procedures, and other High-risk Interventions and Medication use      *This consent is applicable for 30 days following patient signature*    Procedure(s)   IJannet authorize, Dr. Chavira Neither    and the associate(s) or assistant(s) of his/her choice, to perform the following procedure(s): Generator Change Dual chamber Implantable Cardioverter Defibrillator    I know that unexpected conditions may require additional or different procedures than those above. I authorize the above named practitioner(s) perform these as necessary and desirable. This is based on the practitioners professional judgment. The above named practitioner has discussed the above procedure(s) with me, including:  ? Potential benefits, including likelihood of success of the procedure(s) goals  ? Risks  ? Side effects, risk of death, and risk of infection  ? Any potential problems that might occur during recuperation or healing post-procedure  ? Reasonable alternatives  ? Risks of NOT performing the procedure(s)    I acknowledge that no warranty or guarantee has been made to the results the procedure(s). I consent to the above named practitioner(s) providing additional services to me as deemed reasonable and necessary, including but not limited to:    ? Use of medications for anesthesia or sedation. ? All anesthesia and sedation carry risks. My practitioner has discussed my anticipated anesthesia and/or sedation and the risks of using, risk of not using, benefits, side effects, and alternatives. ? Use of pathology  ? I authorize [facility]) to dispose of tissues, specimens or organs when pathology is complete. ? Use of radiology  ? A contrast agent may be required for radiology procedures. My practitioner has advised me of the risks of using, risks of not using, benefits, side effects, and alternatives. ? Observers or use of photography, video/audio recording, or televising of the procedure(s). This is for medical, scientific, or educational purposes. This includes appropriate portions of my body. My identity will not be revealed. ? I consent to release of my social security number and other identifying information to rafa 145 (FDA), and the supplier/, if I receive tissue, a device, or implant. This is to track the tissue, device, or implant for defect, recall, infection, etc.     ? Use of blood and/or blood products, if needed, through my hospital stay. My practitioner has advised me of the risks of using, risks of not using, benefits, side effects, and alternatives. ___ I do NOT want Blood or Blood products given. (Complete separate  refusal form)    Code Status (kris one):  ___ I do NOT HAVE a DNR order. I am a Full-code.   I will receive CPR, intubation,  chest compressions, medications, and/or other life saving measures if I have a  cardiac or respiratory arrest.    ___ I have a Do Not Resuscitate (DNR)order.   (kris one below)  ___  I rescind my DNR for surgery and immediate post-operative period through Phase 2 recovery. This means, for that time period, I will be a Full-code and receive CPR, intubation, chest compressions, medications, and/or other life saving measures, if I have a cardiac or respiratory arrest.    ___ I WANT to keep my DNR in effect during my procedure(s) and immediate post-operative recovery period through Phase 2 recovery. (Complete separate refusal form)     This form has been fully explained to me. I understand its contents. Patients Signature: ___________________________Date: ________  Time: ________    If patient unable to sign, has engaged the [address] [place] [address], is a minor, or has a court-appointed Guardian:  [address] [place] [address] Representative Name (Print):  ____________________________________      Relationship ([place] one):    Guardian   Parent    Spouse    HCPOA   Child   Sibling  Next-of-Kin Friend    Patients Representative Signature: _______________________________________              Date: ______________  Time: __________    An  was used.    name/ID: _________________________________      [facility] Witness________________________  Date: ________   Time: _________    Physician/Practitioner _______________________  Date: ________   Time: _________           Revision 8- Nina De La Torre     PATIENT NAME:    Adriano Yañez                               :   1950  PROCEDURE: Generator Change Dual chamber Implantable Cardioverter Defibrillator    DATE OF PROCEDURE: 10/27/2017    DIAGNOSIS:   SUSAN                      ? PLEASE CALL ABNORMAL RESULTS TO THE REQUESTING PHYSICIAN? ATTENTION PATIENTS:  You do not have to fast for the lab work. You must go to the [facility] at [address] [place] [address] to have the lab work done. Phone: [phone]   Hours: 7:00 am to 5:00 pm    7:00 am to 12:00 pm         Physician Signature:_____________________________________Date:_____________                                Nina Chung Prey:    PROCEDURE: Generator Change Dual chamber Implantable Cardioverter Defibrillator    Patient Name: Adriano Yañez   : 1950   MRN# [id number]    Home Phone Number: [phone]   Weight:    Wt Readings from Last 3 Encounters:   10/11/17 268 lb (121.6 kg)   10/06/17 267 lb (121.1 kg)   10/03/17 267 lb (121.1 kg)        Insurance: Payor: Pavel Harman / Plan: Pavel Harman / Product Type: *No Product type* /     Date of Procedure: 10/27/2017 Time: 1000 Arrival Time: 0800    Diagnosis:  SUSAN  Allergies:    Allergies   Allergen Reactions    Pcn [Penicillins] Hives    Fentanyl Itching        o Call Psychiatric scheduling ([phone]) or Instant Message  o CONFIRMED WITH:__________________________PHONE      OR INSTANT MESSAGE    o PREAUTHORIZATION NUMBER:_________________ Spoke to:____________________  o From date:_________________ expiration date:____________________                    Doloris Moritz

## 2017-10-16 ENCOUNTER — OFFICE VISIT (OUTPATIENT)
Dept: SURGERY | Age: 67
End: 2017-10-16

## 2017-10-16 VITALS
HEART RATE: 88 BPM | WEIGHT: 258 LBS | SYSTOLIC BLOOD PRESSURE: 117 MMHG | BODY MASS INDEX: 34.95 KG/M2 | DIASTOLIC BLOOD PRESSURE: 78 MMHG | HEIGHT: 72 IN

## 2017-10-16 DIAGNOSIS — E11.52 DIABETIC WET GANGRENE OF THE FOOT (HCC): Primary | ICD-10-CM

## 2017-10-16 PROCEDURE — 99024 POSTOP FOLLOW-UP VISIT: CPT | Performed by: SURGERY

## 2017-10-16 NOTE — PROGRESS NOTES
Chief Complaint   Patient presents with    Post-Op Check     3rd p/o right foot third toe amp [address] 9/12/17         SUBJECTIVE:  Patient here for post op visit s/pright foot third toe amp [address] 9/12/17 . This is 3rd visit. Pain is minimal.  Wounds: min bruising and no discharge. OBJECTIVE:  Physical Exam    Wound well healed without signs of active infection. Suture line intact. Abdomen soft, nontender, nondistended. ASSESSMENT:  Patient doing well on this post operative check. Wounds well healed. 1. Diabetic wet gangrene of the foot ([facility]        PLAN:    Continue same  Increase activity as tolerated        No orders of the defined types were placed in this encounter. No orders of the defined types were placed in this encounter. Follow Up: Return in about 1 month (around 11/16/2017).     Swathi Dorantes MD

## 2017-10-24 ENCOUNTER — HOSPITAL ENCOUNTER (OUTPATIENT)
Dept: GENERAL RADIOLOGY | Age: 67
Discharge: HOME | End: 2017-10-24
Attending: INTERNAL MEDICINE | Admitting: INTERNAL MEDICINE

## 2017-10-24 DIAGNOSIS — Z01.811 PRE-OP CHEST EXAM: ICD-10-CM

## 2017-10-24 LAB
ANION GAP SERPL CALCULATED.3IONS-SCNC: 11 MMOL/L (ref 4–16)
APTT: 32.9 SECONDS (ref 21.2–33)
BASOPHILS ABSOLUTE: 0 K/CU MM
BASOPHILS RELATIVE PERCENT: 0.5 % (ref 0–1)
BUN BLDV-MCNC: 13 MG/DL (ref 6–23)
CALCIUM SERPL-MCNC: 9 MG/DL (ref 8.3–10.6)
CHLORIDE BLD-SCNC: 103 MMOL/L (ref 99–110)
CO2: 27 MMOL/L (ref 21–32)
CREAT SERPL-MCNC: 1.4 MG/DL (ref 0.9–1.3)
DIFFERENTIAL TYPE: ABNORMAL
EOSINOPHILS ABSOLUTE: 0.2 K/CU MM
EOSINOPHILS RELATIVE PERCENT: 2.3 % (ref 0–3)
GFR AFRICAN AMERICAN: >60 ML/MIN/1.73M2
GFR NON-AFRICAN AMERICAN: 51 ML/MIN/1.73M2
GLUCOSE BLD-MCNC: 205 MG/DL (ref 70–140)
HCT VFR BLD CALC: 39.1 % (ref 42–52)
HEMOGLOBIN: 13 GM/DL (ref 13.5–18)
IMMATURE NEUTROPHIL %: 0.2 % (ref 0–0.43)
INR BLD: 1.03 INDEX
LYMPHOCYTES ABSOLUTE: 1.3 K/CU MM
LYMPHOCYTES RELATIVE PERCENT: 20.6 % (ref 24–44)
MAGNESIUM: 2 MG/DL (ref 1.8–2.4)
MCH RBC QN AUTO: 30.2 PG (ref 27–31)
MCHC RBC AUTO-ENTMCNC: 33.2 % (ref 32–36)
MCV RBC AUTO: 90.9 FL (ref 78–100)
MONOCYTES ABSOLUTE: 0.3 K/CU MM
MONOCYTES RELATIVE PERCENT: 4.8 % (ref 0–4)
NUCLEATED RBC %: 0 %
PDW BLD-RTO: 12 % (ref 11.7–14.9)
PHOSPHORUS: 2.1 MG/DL (ref 2.5–4.9)
PLATELET # BLD: 201 K/CU MM (ref 140–440)
PMV BLD AUTO: 9.7 FL (ref 7.5–11.1)
POTASSIUM SERPL-SCNC: 4.9 MMOL/L (ref 3.5–5.1)
PROTHROMBIN TIME: 11.8 SECONDS (ref 9.12–12.5)
RBC # BLD: 4.3 M/CU MM (ref 4.6–6.2)
SEGMENTED NEUTROPHILS ABSOLUTE COUNT: 4.6 K/CU MM
SEGMENTED NEUTROPHILS RELATIVE PERCENT: 71.6 % (ref 36–66)
SODIUM BLD-SCNC: 141 MMOL/L (ref 135–145)
TOTAL IMMATURE NEUTOROPHIL: 0.01 K/CU MM
TOTAL NUCLEATED RBC: 0 K/CU MM
WBC # BLD: 6.4 K/CU MM (ref 4–10.5)

## 2017-11-03 ENCOUNTER — TELEPHONE (OUTPATIENT)
Dept: CARDIOLOGY CLINIC | Age: 67
End: 2017-11-03

## 2017-11-03 NOTE — TELEPHONE ENCOUNTER
Called patient and was able to give instructions on setting up carelink monitor. ICD and monitor are linked.

## 2017-11-08 ENCOUNTER — NURSE ONLY (OUTPATIENT)
Dept: CARDIOLOGY CLINIC | Age: 67
End: 2017-11-08

## 2017-11-08 VITALS — TEMPERATURE: 97.8 F

## 2017-11-08 DIAGNOSIS — Z95.810 STATUS POST IMPLANTATION OF AUTOMATIC CARDIOVERTER/DEFIBRILLATOR (AICD): Primary | ICD-10-CM

## 2017-11-08 NOTE — PROGRESS NOTES
Patient seen for site check post ICD battery replacement. Dressing removed and 9 staples removed from site. No signs of inflammation or infection noted. Edges well approximated. Patient has no complaints of pain or discomfort. Single steri strip applied. Patient given instructions on use of Numerate monitor.

## 2017-11-10 ENCOUNTER — HOSPITAL ENCOUNTER (OUTPATIENT)
Dept: GENERAL RADIOLOGY | Age: 67
Discharge: HOME | End: 2017-11-10
Attending: INTERNAL MEDICINE | Admitting: INTERNAL MEDICINE

## 2017-11-10 LAB
ANION GAP SERPL CALCULATED.3IONS-SCNC: 15 MMOL/L (ref 4–16)
BUN BLDV-MCNC: 16 MG/DL (ref 6–23)
CALCIUM SERPL-MCNC: 9 MG/DL (ref 8.3–10.6)
CHLORIDE BLD-SCNC: 101 MMOL/L (ref 99–110)
CO2: 27 MMOL/L (ref 21–32)
CREAT SERPL-MCNC: 1.4 MG/DL (ref 0.9–1.3)
GFR AFRICAN AMERICAN: >60 ML/MIN/1.73M2
GFR NON-AFRICAN AMERICAN: 51 ML/MIN/1.73M2
GLUCOSE BLD-MCNC: 194 MG/DL (ref 70–140)
MAGNESIUM: 2.1 MG/DL (ref 1.8–2.4)
PHOSPHORUS: 2.7 MG/DL (ref 2.5–4.9)
POTASSIUM SERPL-SCNC: 4.8 MMOL/L (ref 3.5–5.1)
SODIUM BLD-SCNC: 143 MMOL/L (ref 135–145)

## 2017-11-13 ENCOUNTER — OFFICE VISIT (OUTPATIENT)
Dept: SURGERY | Age: 67
End: 2017-11-13

## 2017-11-13 VITALS
HEART RATE: 77 BPM | WEIGHT: 255 LBS | BODY MASS INDEX: 34.54 KG/M2 | SYSTOLIC BLOOD PRESSURE: 139 MMHG | HEIGHT: 72 IN | DIASTOLIC BLOOD PRESSURE: 59 MMHG

## 2017-11-13 DIAGNOSIS — E11.52 DIABETIC WET GANGRENE OF THE FOOT (HCC): Primary | ICD-10-CM

## 2017-11-13 PROCEDURE — 11042 DBRDMT SUBQ TIS 1ST 20SQCM/<: CPT | Performed by: SURGERY

## 2017-11-13 ASSESSMENT — ENCOUNTER SYMPTOMS
CONSTIPATION: 0
EYE ITCHING: 0
BACK PAIN: 0
ANAL BLEEDING: 0
COLOR CHANGE: 0
SORE THROAT: 0
STRIDOR: 0
EYE REDNESS: 0
RECTAL PAIN: 0
PHOTOPHOBIA: 0
APNEA: 0
CHOKING: 0

## 2017-11-14 NOTE — PROGRESS NOTES
Chief Complaint   Patient presents with    Post-Op Check     c/c-post op check- 4th p/o right foot-third toe amp 9/12/17       SUBJECTIVE:  HPI: Patient presents today for an office procedure. Complaining of right great toe gangrene. Pt is ready to have this lesion removed. Thoroughly reviewed the patient's medical history, family history, social history and review of systems with the patient today in the office. Please see medical record for pertinent positives.       Past Medical History:   Diagnosis Date    Acid reflux     Acute MI 2004, 2008    Arthritis     Back    Broken teeth     Upper Front    CAD (coronary artery disease)     Sees Dr. Arturo Montero [facility])     per old chart    CHF (congestive heart failure) ([address]     Chronic back pain     Chronic kidney disease     STAGE 3 KIDNEY FAILURE- \"from my diabetes- do not follow with any one- have seen Dr Zari Thornton in the past\"    Diabetes mellitus ([address] Dx 1965    per old chart pt has been diabetic since age 13    Diabetic neuropathy ([address]     \"in my feet\"    H/O cardiovascular stress test 08/25/2016    History of irregular heartbeat     History of syncope     per old chart pt had hx syncope and dizziness for multiple yrs so ICD placed    Hyperlipidemia     Hypertension     Leg swelling     bilat---up to thighs---reduces at times with lying down    Necrotic toes (HCC)     toe gangrene    Neuropathy ([address]     both feet    PVD (peripheral vascular disease) ([address]     Sick sinus syndrome ([address]     Sleep apnea     \"sleep study 3 yrs ago- could not tolerate the cpap made me to dry\"    Spinal stenosis     Teeth missing     Upper And Lower      Past Surgical History:   Procedure Laterality Date    CARDIAC CATHETERIZATION      per old chart done 10/2014    CARDIAC CATHETERIZATION  07/14/2017    with angiography of leg    CARDIAC DEFIBRILLATOR PLACEMENT  06/04/2010    [employer] [address] Defibrillator Implanted    COLECTOMY Right 08/26/2016    laparascopic; robotic assisted    COLONOSCOPY  08/04/2016    CORONARY ANGIOPLASTY      \"15 Heart Stents\"    CORONARY ANGIOPLASTY WITH STENT PLACEMENT      per old chart had angio with stent to circumflex and obtuse marginal artery at [facility] 5/2010( old chart also gives hx of stent placement done 2000,2004 and 2005)   [address]      Teeth Extracted In Past    PACEMAKER PLACEMENT  06/04/2010    [employer] [address] Defibrillator Implanted    VASCULAR SURGERY      per old chart had balloon angioplasty right superfical femoral artery,right popliteal artery,,right ant.tibial artery, right tibioperoneal trunk, and right post.tibial artery wna stent placement right popliteal artery and superfical femoral artery 7/2012     Current Outpatient Prescriptions   Medication Sig Dispense Refill    acetaminophen (TYLENOL) 325 MG tablet Take 650 mg by mouth every 6 hours as needed for Pain      HYDROcodone-acetaminophen (NORCO) 5-325 MG per tablet Take 1 tablet by mouth every 8 hours as needed for Pain .       lisinopril-hydrochlorothiazide (PRINZIDE;ZESTORETIC) 20-12.5 MG per tablet Take 1 tablet by mouth daily 60 tablet 5    OXYGEN Inhale into the lungs as needed Uses with exertional activities       tiotropium (SPIRIVA RESPIMAT) 1.25 MCG/ACT AERS inhaler Inhale 2 puffs into the lungs daily as needed States usually uses twice a week      clopidogrel (PLAVIX) 75 MG tablet Take 1 tablet by mouth daily 30 tablet 11    albuterol sulfate HFA (VENTOLIN HFA) 108 (90 Base) MCG/ACT inhaler Inhale 2 puffs into the lungs every 4 hours as needed for Wheezing 1 Inhaler 3    furosemide (LASIX) 40 MG tablet Take 40 mg by mouth daily      gabapentin (NEURONTIN) 600 MG tablet Take 600 mg by mouth nightly      insulin lispro (HUMALOG KWIKPEN) 100 UNIT/ML pen Inject into the skin 3 times daily (before meals) Sliding scale coverage for meals      DULoxetine (CYMBALTA) 30 MG extended release capsule Take 1 capsule by mouth daily 30 capsule 3    carvedilol (COREG) 25 MG tablet Take 1 tablet by mouth 2 times daily 60 tablet 3    hydrochlorothiazide (HYDRODIURIL) 25 MG tablet Take 1 tablet by mouth daily 30 tablet 1    NIFEdipine (PROCARDIA XL) 60 MG extended release tablet Take 1 tablet by mouth nightly 30 tablet 3    insulin glargine (LANTUS SOLOSTAR) 100 UNIT/ML injection pen Inject 40 Units into the skin nightly (Patient taking differently: Inject 45 Units into the skin nightly ) 5 Pen 3     No current facility-administered medications for this visit. Allergies   Allergen Reactions    Pcn [Penicillins] Hives    Fentanyl Itching       Review of Systems:         Review of Systems   Constitutional: Negative for chills and fever. HENT: Negative for ear pain, mouth sores, sore throat and tinnitus. Eyes: Negative for photophobia, redness and itching. Respiratory: Negative for apnea, choking and stridor. Cardiovascular: Negative for chest pain and palpitations. Gastrointestinal: Negative for anal bleeding, constipation and rectal pain. Endocrine: Negative for polydipsia. Genitourinary: Negative for enuresis, flank pain and hematuria. Musculoskeletal: Positive for joint swelling. Negative for back pain and myalgias. Skin: Negative for color change and pallor. Allergic/Immunologic: Negative for environmental allergies. Neurological: Negative for syncope and speech difficulty. Psychiatric/Behavioral: Negative for confusion and hallucinations. OBJECTIVE:  Physical Exam:    BP (!) 139/59   Pulse 77   Ht 6' (1.829 m)   Wt 255 lb (115.7 kg)   BMI 34.58 kg/m²      Physical Exam   Constitutional: He is oriented to person, place, and time. He appears well-developed and well-nourished. HENT:   Head: Normocephalic. Eyes: Pupils are equal, round, and reactive to light. Neck: Normal range of motion. Neck supple. Cardiovascular: Normal rate.     Pulmonary/Chest: Effort normal.   Abdominal: Soft. He exhibits no distension and no mass. There is no tenderness. There is no rebound and no guarding. Musculoskeletal: Normal range of motion. Feet:    Neurological: He is alert and oriented to person, place, and time. Skin: Skin is warm. Psychiatric: He has a normal mood and affect. ASSESSMENT:  1. Diabetic wet gangrene of the foot [facility])          PLAN:  Treatment:  Patient counseled on risks, benefits, and alternatives of treatment plan at length today. Patient states an understanding and willingness to proceed with plan. Office Procedure note:      Pre-op Diagnosis:    1. Diabetic wet gangrene of the foot ([address]         Post-op Diagnosis:  Same. Procedure:   Excisional debridement  1. Diabetic wet gangrene of the foot ([address]      4 sq cm sub cutaneous . Surgeon:   Estuardo Hayes MD    Anesthesia:   Local with 1% Lidocaine local infiltration,without epinephrine. Complications:  None. Drains: None    Indications:   See progress note. .     Procedure: The patient is placed with the above described area exposed. This was  prepped, draped and anesthestized in the usual sterile manner. An incision was created. The lesion is excised completely. The resulting wound is closed with nylon. A sterile dressing is applied. The patient is instructed on wound care. .        No orders of the defined types were placed in this encounter. No orders of the defined types were placed in this encounter. Follow Up:  Return in about 2 weeks (around 11/27/2017). to remove sutures and review path report.        Chan Wells MD

## 2017-11-17 PROBLEM — I10 HTN (HYPERTENSION): Status: ACTIVE | Noted: 2017-11-17

## 2017-11-27 ENCOUNTER — OFFICE VISIT (OUTPATIENT)
Dept: SURGERY | Age: 67
End: 2017-11-27

## 2017-11-27 ENCOUNTER — OFFICE VISIT (OUTPATIENT)
Dept: CARDIOLOGY CLINIC | Age: 67
End: 2017-11-27

## 2017-11-27 VITALS
DIASTOLIC BLOOD PRESSURE: 78 MMHG | HEART RATE: 76 BPM | SYSTOLIC BLOOD PRESSURE: 138 MMHG | WEIGHT: 255.07 LBS | HEIGHT: 72 IN | BODY MASS INDEX: 34.55 KG/M2

## 2017-11-27 VITALS
HEART RATE: 70 BPM | HEIGHT: 72 IN | WEIGHT: 250 LBS | SYSTOLIC BLOOD PRESSURE: 138 MMHG | BODY MASS INDEX: 33.86 KG/M2 | DIASTOLIC BLOOD PRESSURE: 70 MMHG

## 2017-11-27 DIAGNOSIS — I96 NECROTIC TOES (HCC): Primary | ICD-10-CM

## 2017-11-27 DIAGNOSIS — I73.9 PAD (PERIPHERAL ARTERY DISEASE) (HCC): Primary | ICD-10-CM

## 2017-11-27 PROCEDURE — G8484 FLU IMMUNIZE NO ADMIN: HCPCS | Performed by: INTERNAL MEDICINE

## 2017-11-27 PROCEDURE — G8598 ASA/ANTIPLAT THER USED: HCPCS | Performed by: INTERNAL MEDICINE

## 2017-11-27 PROCEDURE — 4040F PNEUMOC VAC/ADMIN/RCVD: CPT | Performed by: SURGERY

## 2017-11-27 PROCEDURE — 1123F ACP DISCUSS/DSCN MKR DOCD: CPT | Performed by: INTERNAL MEDICINE

## 2017-11-27 PROCEDURE — 4040F PNEUMOC VAC/ADMIN/RCVD: CPT | Performed by: INTERNAL MEDICINE

## 2017-11-27 PROCEDURE — 99024 POSTOP FOLLOW-UP VISIT: CPT | Performed by: SURGERY

## 2017-11-27 PROCEDURE — G8428 CUR MEDS NOT DOCUMENT: HCPCS | Performed by: SURGERY

## 2017-11-27 PROCEDURE — G8598 ASA/ANTIPLAT THER USED: HCPCS | Performed by: SURGERY

## 2017-11-27 PROCEDURE — 1036F TOBACCO NON-USER: CPT | Performed by: SURGERY

## 2017-11-27 PROCEDURE — G8417 CALC BMI ABV UP PARAM F/U: HCPCS | Performed by: SURGERY

## 2017-11-27 PROCEDURE — 99214 OFFICE O/P EST MOD 30 MIN: CPT | Performed by: INTERNAL MEDICINE

## 2017-11-27 PROCEDURE — 1036F TOBACCO NON-USER: CPT | Performed by: INTERNAL MEDICINE

## 2017-11-27 PROCEDURE — 3017F COLORECTAL CA SCREEN DOC REV: CPT | Performed by: INTERNAL MEDICINE

## 2017-11-27 PROCEDURE — 3017F COLORECTAL CA SCREEN DOC REV: CPT | Performed by: SURGERY

## 2017-11-27 PROCEDURE — G8484 FLU IMMUNIZE NO ADMIN: HCPCS | Performed by: SURGERY

## 2017-11-27 PROCEDURE — 1123F ACP DISCUSS/DSCN MKR DOCD: CPT | Performed by: SURGERY

## 2017-11-27 PROCEDURE — G8417 CALC BMI ABV UP PARAM F/U: HCPCS | Performed by: INTERNAL MEDICINE

## 2017-11-27 PROCEDURE — G8427 DOCREV CUR MEDS BY ELIG CLIN: HCPCS | Performed by: INTERNAL MEDICINE

## 2017-11-27 NOTE — PROGRESS NOTES
Kae Brock MD        OFFICE  FOLLOWUP NOTE    Chief complaints: patient is here for management of ICD, DM, HTN,PAD, DYSLPIDEMIA    Subjective: patient feels better, no chest pain, no shortness of breath, no dizziness, no palpitations    LISETTE Mcallister is a 79 y. o.year old who  has a past medical history of Acid reflux; Acute MI; Arthritis; Broken teeth; CAD (coronary artery disease); Cardiomyopathy [facility]); CHF (congestive heart failure) ([facility] [address] Chronic back pain; Chronic kidney disease; Diabetes mellitus ([facility] [address] Diabetic neuropathy ([facility] [address] H/O cardiovascular stress test; History of irregular heartbeat; History of syncope; Hyperlipidemia; Hypertension; Leg swelling; Necrotic toes ([facility] [address] Neuropathy ([facility] [address] PVD (peripheral vascular disease) ([facility] [address] Sick sinus syndrome ([facility] [address] Sleep apnea; Spinal stenosis; and Teeth missing. and presents for management of  ICD, DM, HTN,PAD, DYSLPIDEMIA    which are well controlled, he had ICD generator change, his rt toe was amputated as well, has leg swelling. Current Outpatient Prescriptions   Medication Sig Dispense Refill    lisinopril-hydrochlorothiazide (PRINZIDE;ZESTORETIC) 20-12.5 MG per tablet Take 1 tablet by mouth 2 times daily 60 tablet 5    acetaminophen (TYLENOL) 325 MG tablet Take 650 mg by mouth every 6 hours as needed for Pain      HYDROcodone-acetaminophen (NORCO) 5-325 MG per tablet Take 1 tablet by mouth every 8 hours as needed for Pain .       OXYGEN Inhale into the lungs as needed Uses with exertional activities       tiotropium (SPIRIVA RESPIMAT) 1.25 MCG/ACT AERS inhaler Inhale 2 puffs into the lungs daily as needed States usually uses twice a week      clopidogrel (PLAVIX) 75 MG tablet Take 1 tablet by mouth daily 30 tablet 11    albuterol sulfate HFA (VENTOLIN HFA) 108 (90 Base) MCG/ACT inhaler Inhale 2 puffs into the lungs every 4 hours as needed for Wheezing 1 Inhaler 3    furosemide (LASIX) 40 MG tablet Take 40 mg by mouth daily      gabapentin (NEURONTIN) 600 MG tablet Take 600 mg by mouth nightly      insulin lispro (HUMALOG KWIKPEN) 100 UNIT/ML pen Inject into the skin 3 times daily (before meals) Sliding scale coverage for meals      DULoxetine (CYMBALTA) 30 MG extended release capsule Take 1 capsule by mouth daily 30 capsule 3    carvedilol (COREG) 25 MG tablet Take 1 tablet by mouth 2 times daily 60 tablet 3    NIFEdipine (PROCARDIA XL) 60 MG extended release tablet Take 1 tablet by mouth nightly 30 tablet 3    insulin glargine (LANTUS SOLOSTAR) 100 UNIT/ML injection pen Inject 40 Units into the skin nightly (Patient taking differently: Inject 45 Units into the skin nightly ) 5 Pen 3     No current facility-administered medications for this visit.       Allergies: Pcn [penicillins] and Fentanyl  Past Medical History:   Diagnosis Date    Acid reflux     Acute MI 2004, 2008    Arthritis     Back    Broken teeth     Upper Front    CAD (coronary artery disease)     Sees Dr. Ty Resendiz [facility])     per old chart    CHF (congestive heart failure) ([facility] [address]     Chronic back pain     Chronic kidney disease     STAGE 3 KIDNEY FAILURE- \"from my diabetes- do not follow with any one- have seen Dr Lauri De La O in the past\"    Diabetes mellitus ([facility] [address] Dx 1965    per old chart pt has been diabetic since age 13    Diabetic neuropathy ([facility] [address]     \"in my feet\"    H/O cardiovascular stress test 08/25/2016    History of irregular heartbeat     History of syncope     per old chart pt had hx syncope and dizziness for multiple yrs so ICD placed    Hyperlipidemia     Hypertension     Leg swelling     bilat---up to thighs---reduces at times with lying down    Necrotic toes (HCC)     toe gangrene    Neuropathy ([address]     both feet    PVD (peripheral vascular disease) ([address]     Sick sinus syndrome ([address]     Sleep apnea     \"sleep study 3 yrs ago- could not tolerate the cpap made me to dry\"    Spinal stenosis     Teeth missing Vertigo  · Cardiovascular: No chest pain, dyspnea on exertion, palpitations or loss of consciousness  · Respiratory: No cough or wheezing    · Gastrointestinal: No abdominal pain, appetite loss, blood in stools, constipation, diarrhea or heartburn  · Genitourinary: No dysuria, trouble voiding, or hematuria  · Musculoskeletal: rt leg swelling, rt great toe amputated and rt little finger gangrene No gait disturbance, weakness or joint complaints  · Integumentary: No rash or pruritis  · Neurological: No TIA or stroke symptoms  · Psychiatric: No anxiety or depression  · Endocrine: No malaise, fatigue or temperature intolerance  · Hematologic/Lymphatic: No bleeding problems, blood clots or swollen lymph nodes  · Allergic/Immunologic: No nasal congestion or hives  All systems negative except as marked. Objective:  /70   Pulse 70   Ht 6' (1.829 m)   Wt 250 lb (113.4 kg)   BMI 33.91 kg/m²   Wt Readings from Last 3 Encounters:   11/27/17 250 lb (113.4 kg)   11/27/17 255 lb 1.2 oz (115.7 kg)   11/17/17 255 lb (115.7 kg)     Body mass index is 33.91 kg/m². GENERAL - Alert, oriented, pleasant, in no apparent distress,normal grooming  HEENT  pupils are reactive to light and accomodation, cornea intact, conjunctive normal color, ears are normal in exam,throat exam in normal, teeth, gum and palate are normal, oral mucosa is normal without any notation of pallor or cyanosis  Neck - Supple. No jugular venous distention noted. No carotid bruits, no apical -carotid delay  Respiratory:  Normal breath sounds, No respiratory distress, No wheezing, No chest tenderness. ,no use of accessory muscles, diaphragm movement is normal  Cardiovascular: (PMI) apex non displaced,no lifts no thrills, no s3,no s4, Normal heart rate, Normal rhythm, No murmurs, No rubs, No gallops.  Carotid arteries pulse and amplitude are normal no bruit, no abdominal bruit noted ( normal abdominal aorta ausculation), femoral arteries pulse and amplitude are normal no bruit, pedal pulses are normal  Femoral pulses have normal amplitude, no bruits   Extremities - No cyanosis, clubbing, or significant edema, no varicose veins    Abdomen  No masses, tenderness, or organomegaly, no hepato-splenomegally, no bruits  Musculoskeletal,  rt leg swelling, rt great toe amputated and rt little finger gangrene, mild edema, no kyphosis or scoliosis, no deformity in any extremity noted, muscle strength and tone are normal  Skin: no ulcer,no scar,no stasis dermatitis   Neurologic  alert oriented times 3,Cranial nerves II through XII are grossly intact. There were no gross focal neurologic abnormalities. All sensory and motor nerves examined and were normal  Psychiatric: normal mood and affect    Lab Results   Component Value Date    CKTOTAL 254 04/28/2014    CKMB 12.7 07/22/2012    TROPONINI 0.018 04/28/2014    TROPONINI 0.027 09/08/2011     BNP:    Lab Results   Component Value Date    BNP 67 03/27/2014     PT/INR:  No results found for: PTINR  Lab Results   Component Value Date    LABA1C 8.3 (H) 07/07/2017    LABA1C 8.0 10/20/2016     Lab Results   Component Value Date    CHOL 121 01/25/2016    TRIG 74 01/25/2016    HDL 36 (L) 01/25/2016    LDLCALC 70 01/25/2016    LDLDIRECT 77 06/16/2015     Lab Results   Component Value Date    ALT 8 (L) 07/06/2017    AST 11 (L) 07/06/2017     TSH:  No results found for: TSH    Impression:  Glen Smiley is a 79 y. o.year old who  has a past medical history of Acid reflux; Acute MI; Arthritis; Broken teeth; CAD (coronary artery disease); Cardiomyopathy [facility]); CHF (congestive heart failure) ([facility] [address] Chronic back pain; Chronic kidney disease; Diabetes mellitus ([facility] [address] Diabetic neuropathy ([address] H/O cardiovascular stress test; History of irregular heartbeat; History of syncope; Hyperlipidemia; Hypertension; Leg swelling; Necrotic toes ([address] Neuropathy ([address] PVD (peripheral vascular disease) ([facility] [address] Sick sinus syndrome ([facility] [address]  Sleep apnea; Spinal stenosis; and

## 2017-12-22 DIAGNOSIS — E11.42 TYPE 2 DIABETES MELLITUS WITH DIABETIC POLYNEUROPATHY, WITH LONG-TERM CURRENT USE OF INSULIN (HCC): ICD-10-CM

## 2017-12-22 DIAGNOSIS — Z79.4 TYPE 2 DIABETES MELLITUS WITH DIABETIC POLYNEUROPATHY, WITH LONG-TERM CURRENT USE OF INSULIN (HCC): ICD-10-CM

## 2018-01-04 ENCOUNTER — OFFICE VISIT (OUTPATIENT)
Dept: SURGERY | Age: 68
End: 2018-01-04

## 2018-01-04 VITALS
HEART RATE: 80 BPM | WEIGHT: 250 LBS | DIASTOLIC BLOOD PRESSURE: 77 MMHG | SYSTOLIC BLOOD PRESSURE: 134 MMHG | BODY MASS INDEX: 33.86 KG/M2 | HEIGHT: 72 IN

## 2018-01-04 DIAGNOSIS — I96 TOE GANGRENE (HCC): Primary | ICD-10-CM

## 2018-01-04 PROCEDURE — G8417 CALC BMI ABV UP PARAM F/U: HCPCS | Performed by: PHYSICIAN ASSISTANT

## 2018-01-04 PROCEDURE — 1036F TOBACCO NON-USER: CPT | Performed by: PHYSICIAN ASSISTANT

## 2018-01-04 PROCEDURE — 4040F PNEUMOC VAC/ADMIN/RCVD: CPT | Performed by: PHYSICIAN ASSISTANT

## 2018-01-04 PROCEDURE — G8598 ASA/ANTIPLAT THER USED: HCPCS | Performed by: PHYSICIAN ASSISTANT

## 2018-01-04 PROCEDURE — G8428 CUR MEDS NOT DOCUMENT: HCPCS | Performed by: PHYSICIAN ASSISTANT

## 2018-01-04 PROCEDURE — 1123F ACP DISCUSS/DSCN MKR DOCD: CPT | Performed by: PHYSICIAN ASSISTANT

## 2018-01-04 PROCEDURE — 3017F COLORECTAL CA SCREEN DOC REV: CPT | Performed by: PHYSICIAN ASSISTANT

## 2018-01-04 PROCEDURE — 99213 OFFICE O/P EST LOW 20 MIN: CPT | Performed by: PHYSICIAN ASSISTANT

## 2018-01-04 PROCEDURE — G8484 FLU IMMUNIZE NO ADMIN: HCPCS | Performed by: PHYSICIAN ASSISTANT

## 2018-01-04 ASSESSMENT — ENCOUNTER SYMPTOMS
ALLERGIC/IMMUNOLOGIC NEGATIVE: 1
GASTROINTESTINAL NEGATIVE: 1
EYES NEGATIVE: 1
CHEST TIGHTNESS: 1
SHORTNESS OF BREATH: 1

## 2018-01-04 NOTE — PROGRESS NOTES
Chief Complaint   Patient presents with    Wound Check     1 MONTH CHECK RIGHT FOOT 3RD TOE AMP @[facility] 9/12/14         SUBJECTIVE:  HPI: Patient is here for   1 MONTH CHECK s/p-RIGHT FOOT 3RD TOE AMP @[facility] 9/12/14  I&D 11/13/17  Pt has been applying iodine swabs every day to the affected area with daily wound care dressing changes. The 5th digit on right foot has not improved, is gradually getting worse in appearance and odor. Pt complains of increased neuropathy pain. Pt takes all medications as directed. Thoroughly reviewed the patient's medical history, family history, social history and review of systems with the patient today in the office. Please see medical record for pertinent positives.       Past Medical History:   Diagnosis Date    Acid reflux     Acute MI 2004, 2008    Arthritis     Back    Broken teeth     Upper Front    CAD (coronary artery disease)     Se[facility] Dr. Mcwilliams [facility])     per old chart    CHF (congestive heart failure) ([address]     Chronic back pain     Chronic kidney disease     STAGE 3 KIDNEY FAILURE- \"from my diabetes- do not follow with any one- have seen Dr Cliff Miramontes in the past\"    Diabetes mellitus ([address] Dx 1965    per old chart pt has been diabetic since age 13    Diabetic neuropathy ([address]     \"in my feet\"    H/O cardiovascular stress test 08/25/2016    History of irregular heartbeat     History of syncope     per old chart pt had hx syncope and dizziness for multiple yrs so ICD placed    Hyperlipidemia     Hypertension     Leg swelling     bilat---up to thighs---reduces at times with lying down    Necrotic toes (HCC)     toe gangrene    Neuropathy ([address]     both feet    PVD (peripheral vascular disease) ([address]     Sick sinus syndrome ([address]     Sleep apnea     \"sleep study 3 yrs ago- could not tolerate the cpap made me to dry\"    Spinal stenosis     Teeth missing     Upper And Lower      Past Surgical History:   Procedure Laterality Date    dry. No rash noted. He is not diaphoretic. No erythema. No pallor. Psychiatric: He has a normal mood and affect. ASSESSMENT:  1. Toe gangrene ([facility] [address]          PLAN:  Treatment:   Patient counseled on risks, benefits, and alternatives of treatment plan at length today. Patient states an understanding and willingness to proceed with plan. Consent signed  Will proceed with scheduling R 5th toe amputation and debridement of 1st right toe  Continue local wound care  Dressing changed today and wounds covered with iodine. No orders of the defined types were placed in this encounter. No orders of the defined types were placed in this encounter. Follow Up:  Return after scheduled surgery.       Valencia Desir PA-C

## 2018-01-08 ENCOUNTER — HOSPITAL ENCOUNTER (OUTPATIENT)
Dept: PREADMISSION TESTING | Age: 68
Discharge: HOME | End: 2018-01-08
Attending: SURGERY | Admitting: SURGERY

## 2018-01-08 ENCOUNTER — TELEPHONE (OUTPATIENT)
Dept: SURGERY | Age: 68
End: 2018-01-08

## 2018-01-08 VITALS
DIASTOLIC BLOOD PRESSURE: 78 MMHG | SYSTOLIC BLOOD PRESSURE: 180 MMHG | RESPIRATION RATE: 16 BRPM | WEIGHT: 250 LBS | HEIGHT: 72 IN | BODY MASS INDEX: 33.86 KG/M2 | OXYGEN SATURATION: 95 % | HEART RATE: 64 BPM | TEMPERATURE: 98.5 F

## 2018-01-08 ASSESSMENT — PAIN DESCRIPTION - ORIENTATION: ORIENTATION: RIGHT;LEFT

## 2018-01-08 ASSESSMENT — PAIN SCALES - GENERAL: PAINLEVEL_OUTOF10: 9

## 2018-01-08 ASSESSMENT — PAIN DESCRIPTION - LOCATION: LOCATION: FOOT

## 2018-01-08 ASSESSMENT — PAIN DESCRIPTION - PAIN TYPE: TYPE: CHRONIC PAIN

## 2018-01-08 ASSESSMENT — PAIN DESCRIPTION - FREQUENCY: FREQUENCY: CONTINUOUS

## 2018-01-08 NOTE — PRE-PROCEDURE INSTRUCTIONS
Pre-op instruction sheet reviewed with patient with understanding voiced. Pt understands to take Carvedilol 25mg, Lisinopril-HCTZ 20-12.5mg and Norco (if needed) on the morning of surgery with a sip of water only. Pt also instructed to use Spiriva inhaler as usual.  Pt given both Hibiclens (CHG) solution and Aloe Vesta body wash and instructed for usage preoperatively with understanding voiced. Pt understands to follow all other instructions given by surgeon's office. Pt given phone number of [employer] to call for transportation home today and possibly to here for surgery tomorrow.

## 2018-01-09 PROBLEM — E87.5 HYPERKALEMIA: Status: ACTIVE | Noted: 2018-01-09

## 2018-01-22 ENCOUNTER — OFFICE VISIT (OUTPATIENT)
Dept: SURGERY | Age: 68
End: 2018-01-22

## 2018-01-22 VITALS
WEIGHT: 285.5 LBS | DIASTOLIC BLOOD PRESSURE: 64 MMHG | HEART RATE: 60 BPM | BODY MASS INDEX: 38.67 KG/M2 | SYSTOLIC BLOOD PRESSURE: 102 MMHG | HEIGHT: 72 IN

## 2018-01-22 DIAGNOSIS — I96 TOE GANGRENE (HCC): Primary | ICD-10-CM

## 2018-01-22 PROCEDURE — 99024 POSTOP FOLLOW-UP VISIT: CPT | Performed by: SURGERY

## 2018-01-22 NOTE — PROGRESS NOTES
Chief Complaint   Patient presents with    Post-Op Check     1st right 5th toe amp & L tonel trim [facility] 1/9/18         SUBJECTIVE:  Patient here for post op visit s/p   1. Amputation of right fifth toe                          2. Left 2,3,4,5 toe nail trimming , 1/9/18. This is 1st  visit. Pain is 7-8/10  Wounds: min bruising and no discharge. Normal BM    OBJECTIVE:  Physical Exam    Wound well healed without signs of active infection. Suture line intact. Abdomen soft, nontender, nondistended. Path reveals:   Specimen #[id number]  Final Pathologic Diagnosis:  Toe, right 5th, amputation:  - Ulceration and necrosis. Electronically Signed Out By Maulik Blackwood. Kartik De Santiago MD    ASSESSMENT:  Patient doing well on this post operative check. Wounds well healed. 1. Toe gangrene ([address]        PLAN:  Return in one week to remove sutures  Continue same  Increase activity as tolerated        No orders of the defined types were placed in this encounter. No orders of the defined types were placed in this encounter. Follow Up: Return in about 1 week (around 1/29/2018).     Benji Richey MD

## 2018-01-23 ENCOUNTER — HOSPITAL ENCOUNTER (OUTPATIENT)
Dept: GENERAL RADIOLOGY | Age: 68
Discharge: HOME | End: 2018-01-23
Attending: INTERNAL MEDICINE | Admitting: INTERNAL MEDICINE

## 2018-01-23 LAB
ANION GAP SERPL CALCULATED.3IONS-SCNC: 12 MMOL/L (ref 4–16)
BUN BLDV-MCNC: 35 MG/DL (ref 6–23)
CALCIUM SERPL-MCNC: 9.3 MG/DL (ref 8.3–10.6)
CHLORIDE BLD-SCNC: 99 MMOL/L (ref 99–110)
CO2: 28 MMOL/L (ref 21–32)
CREAT SERPL-MCNC: 2.2 MG/DL (ref 0.9–1.3)
GFR AFRICAN AMERICAN: 36 ML/MIN/1.73M2
GFR NON-AFRICAN AMERICAN: 30 ML/MIN/1.73M2
GLUCOSE BLD-MCNC: 288 MG/DL (ref 70–99)
POTASSIUM SERPL-SCNC: 4.9 MMOL/L (ref 3.5–5.1)
SODIUM BLD-SCNC: 139 MMOL/L (ref 135–145)

## 2018-01-24 ENCOUNTER — TELEPHONE (OUTPATIENT)
Dept: CARDIOLOGY CLINIC | Age: 68
End: 2018-01-24

## 2018-01-29 ENCOUNTER — OFFICE VISIT (OUTPATIENT)
Dept: SURGERY | Age: 68
End: 2018-01-29

## 2018-01-29 VITALS
HEART RATE: 68 BPM | HEIGHT: 72 IN | DIASTOLIC BLOOD PRESSURE: 78 MMHG | BODY MASS INDEX: 38.67 KG/M2 | WEIGHT: 285.5 LBS | SYSTOLIC BLOOD PRESSURE: 118 MMHG

## 2018-01-29 DIAGNOSIS — Z98.890 POST-OPERATIVE STATE: Primary | ICD-10-CM

## 2018-01-29 PROCEDURE — 99024 POSTOP FOLLOW-UP VISIT: CPT | Performed by: PHYSICIAN ASSISTANT

## 2018-01-29 NOTE — PROGRESS NOTES
Chief Complaint   Patient presents with    Post-Op Check     2nd p/o 1. Amputation of right fifth toe 1/9/18         SUBJECTIVE:  Patient here for post op visit s/p 1. Amputation of right fifth toe   2. Left 2,3,4,5 toe nail trimming, 1/9/18  . This is 2nd visit. Pain is minimal.  Wounds: min bruising and no discharge. Normal BM   OBJECTIVE:  Physical Exam   Constitutional: He is oriented to person, place, and time. He appears well-developed and well-nourished. No distress. Abdominal: Soft. Bowel sounds are normal. He exhibits no distension and no mass. There is no tenderness. There is no rebound and no guarding. Musculoskeletal:        Feet:    Dehiscence of incision. Right 5th digit s/p amputation. Some purulent material around the incision. Neurological: He is alert and oriented to person, place, and time. Skin: Skin is warm and dry. No rash noted. He is not diaphoretic. No erythema. No pallor. Psychiatric: He has a normal mood and affect. Wound well healed without signs of active infection. Suture line intact. Abdomen soft, nontender, nondistended. Path reveals:    Final Pathologic Diagnosis:  Toe, right 5th, amputation:  - Ulceration and necrosis. ASSESSMENT:  Patient doing well on this post operative check. Wounds well healed. 1. Post-operative state      PLAN:  Continue same  Increase activity as tolerated  Cleanse the site with iodine swabs daily  3 sutures removed, will remove the rest on next F/U  1 week F/U  No orders of the defined types were placed in this encounter. No orders of the defined types were placed in this encounter. Follow Up: Return in about 1 week (around 2/5/2018).   Micheal Dale PA-C

## 2018-02-05 ENCOUNTER — OFFICE VISIT (OUTPATIENT)
Dept: SURGERY | Age: 68
End: 2018-02-05

## 2018-02-05 VITALS
WEIGHT: 285.5 LBS | DIASTOLIC BLOOD PRESSURE: 80 MMHG | BODY MASS INDEX: 38.67 KG/M2 | HEIGHT: 72 IN | SYSTOLIC BLOOD PRESSURE: 124 MMHG

## 2018-02-05 DIAGNOSIS — Z98.890 POST-OPERATIVE STATE: Primary | ICD-10-CM

## 2018-02-05 PROCEDURE — 99024 POSTOP FOLLOW-UP VISIT: CPT | Performed by: PHYSICIAN ASSISTANT

## 2018-02-07 ENCOUNTER — TELEPHONE (OUTPATIENT)
Dept: CARDIOLOGY CLINIC | Age: 68
End: 2018-02-07

## 2018-02-07 NOTE — LETTER
Cardiology [address] [address]  Phone: [phone]  Fax: [phone]            February 7, 2018    Radha Kennedy  [facility] [place] [address]      Dear Ryan Espinoza:    This is your CARELINK schedule. Please kris your calendar with these dates. You can do your checks anytime during the scheduled day. Since we do not do reminder calls, it will be your responsibility to perform the checks on the day it is scheduled. If you have any questions or concerns, please call and ask for Ioana Lyman  at [phone]. Your device is wireless so the transmission will be done while you are sleeping and you will not have to do anything just be sure to sleep in the same room where the monitor is located.

## 2018-02-09 PROBLEM — E87.70 FLUID OVERLOAD: Status: ACTIVE | Noted: 2018-02-09

## 2018-02-09 PROBLEM — N17.9 ACUTE KIDNEY INJURY (HCC): Status: ACTIVE | Noted: 2018-02-09

## 2018-02-09 PROBLEM — I10 HTN (HYPERTENSION): Status: ACTIVE | Noted: 2018-02-09

## 2018-02-09 PROBLEM — E11.9 DM (DIABETES MELLITUS) (HCC): Status: ACTIVE | Noted: 2018-02-09

## 2018-02-16 ENCOUNTER — HOSPITAL ENCOUNTER (OUTPATIENT)
Dept: GENERAL RADIOLOGY | Age: 68
Discharge: HOME | End: 2018-02-28
Attending: INTERNAL MEDICINE | Admitting: INTERNAL MEDICINE

## 2018-02-16 LAB
ANION GAP SERPL CALCULATED.3IONS-SCNC: 10 MMOL/L (ref 4–16)
BUN BLDV-MCNC: 11 MG/DL (ref 6–23)
CALCIUM SERPL-MCNC: 8.6 MG/DL (ref 8.3–10.6)
CHLORIDE BLD-SCNC: 103 MMOL/L (ref 99–110)
CO2: 28 MMOL/L (ref 21–32)
CREAT SERPL-MCNC: 1.5 MG/DL (ref 0.9–1.3)
GFR AFRICAN AMERICAN: 56 ML/MIN/1.73M2
GFR NON-AFRICAN AMERICAN: 47 ML/MIN/1.73M2
GLUCOSE BLD-MCNC: 58 MG/DL (ref 70–99)
POTASSIUM SERPL-SCNC: 4.3 MMOL/L (ref 3.5–5.1)
SODIUM BLD-SCNC: 141 MMOL/L (ref 135–145)

## 2018-02-19 ENCOUNTER — OFFICE VISIT (OUTPATIENT)
Dept: SURGERY | Age: 68
End: 2018-02-19

## 2018-02-19 VITALS
SYSTOLIC BLOOD PRESSURE: 136 MMHG | BODY MASS INDEX: 38.61 KG/M2 | HEIGHT: 72 IN | DIASTOLIC BLOOD PRESSURE: 78 MMHG | WEIGHT: 285.06 LBS

## 2018-02-19 DIAGNOSIS — T81.40XA POSTOPERATIVE INFECTION, INITIAL ENCOUNTER: Primary | ICD-10-CM

## 2018-02-19 DIAGNOSIS — Z98.890 POST-OPERATIVE STATE: ICD-10-CM

## 2018-02-19 PROCEDURE — 99024 POSTOP FOLLOW-UP VISIT: CPT | Performed by: PHYSICIAN ASSISTANT

## 2018-02-19 RX ORDER — CIPROFLOXACIN 500 MG/1
500 TABLET, FILM COATED ORAL 2 TIMES DAILY
Qty: 20 TABLET | Refills: 0 | Status: SHIPPED | OUTPATIENT
Start: 2018-02-19 | End: 2018-03-01

## 2018-02-27 LAB
ANION GAP SERPL CALCULATED.3IONS-SCNC: 13 MMOL/L (ref 4–16)
BUN BLDV-MCNC: 12 MG/DL (ref 6–23)
CALCIUM SERPL-MCNC: 8.4 MG/DL (ref 8.3–10.6)
CHLORIDE BLD-SCNC: 98 MMOL/L (ref 99–110)
CO2: 27 MMOL/L (ref 21–32)
CREAT SERPL-MCNC: 1.5 MG/DL (ref 0.9–1.3)
GFR AFRICAN AMERICAN: 56 ML/MIN/1.73M2
GFR NON-AFRICAN AMERICAN: 47 ML/MIN/1.73M2
GLUCOSE BLD-MCNC: 265 MG/DL (ref 70–99)
POTASSIUM SERPL-SCNC: 4.8 MMOL/L (ref 3.5–5.1)
SODIUM BLD-SCNC: 138 MMOL/L (ref 135–145)

## 2018-03-01 ENCOUNTER — HOSPITAL ENCOUNTER (OUTPATIENT)
Dept: GENERAL RADIOLOGY | Age: 68
Discharge: HOME | End: 2018-03-31
Attending: INTERNAL MEDICINE | Admitting: INTERNAL MEDICINE

## 2018-03-02 ENCOUNTER — TELEPHONE (OUTPATIENT)
Dept: SURGERY | Age: 68
End: 2018-03-02

## 2018-03-05 ENCOUNTER — PROCEDURE VISIT (OUTPATIENT)
Dept: CARDIOLOGY CLINIC | Age: 68
End: 2018-03-05

## 2018-03-05 DIAGNOSIS — Z95.810 ICD (IMPLANTABLE CARDIOVERTER-DEFIBRILLATOR), DUAL, IN SITU: Primary | ICD-10-CM

## 2018-03-05 DIAGNOSIS — I49.5 SINUS NODE DYSFUNCTION (HCC): ICD-10-CM

## 2018-03-05 PROCEDURE — 93297 REM INTERROG DEV EVAL ICPMS: CPT | Performed by: INTERNAL MEDICINE

## 2018-03-05 PROCEDURE — 93295 DEV INTERROG REMOTE 1/2/MLT: CPT | Performed by: INTERNAL MEDICINE

## 2018-03-05 PROCEDURE — 93296 REM INTERROG EVL PM/IDS: CPT | Performed by: INTERNAL MEDICINE

## 2018-03-12 ENCOUNTER — HOSPITAL ENCOUNTER (OUTPATIENT)
Dept: MRI IMAGING | Age: 68
Discharge: HOME | End: 2018-03-12
Attending: SURGERY | Admitting: SURGERY

## 2018-03-12 DIAGNOSIS — Z95.0 PACEMAKER: ICD-10-CM

## 2018-03-12 DIAGNOSIS — T81.40XA POSTOPERATIVE INFECTION, INITIAL ENCOUNTER: ICD-10-CM

## 2018-03-15 ENCOUNTER — OFFICE VISIT (OUTPATIENT)
Dept: SURGERY | Age: 68
End: 2018-03-15

## 2018-03-15 VITALS
HEART RATE: 90 BPM | SYSTOLIC BLOOD PRESSURE: 178 MMHG | DIASTOLIC BLOOD PRESSURE: 80 MMHG | HEIGHT: 72 IN | WEIGHT: 285.06 LBS | BODY MASS INDEX: 38.61 KG/M2

## 2018-03-15 DIAGNOSIS — Z98.890 POST-OPERATIVE STATE: Primary | ICD-10-CM

## 2018-03-15 PROCEDURE — 99024 POSTOP FOLLOW-UP VISIT: CPT | Performed by: PHYSICIAN ASSISTANT

## 2018-03-15 ASSESSMENT — ENCOUNTER SYMPTOMS
SHORTNESS OF BREATH: 0
APNEA: 0
CONSTIPATION: 0
ANAL BLEEDING: 0
EYE DISCHARGE: 0
ALLERGIC/IMMUNOLOGIC NEGATIVE: 1
EYE PAIN: 0
COLOR CHANGE: 1
ABDOMINAL DISTENTION: 0
CHOKING: 0

## 2018-03-15 NOTE — PROGRESS NOTES
SUBJECTIVE:  HPI: Here for F/U for Right Foot. C/O extraneous pain. Described as pain enough to cry for. He was to have a MRI, was unable to due MRI d/t pacer/defib. Pacer rep needed to be available and was not that day. Thoroughly reviewed the patient's medical history, family history, social history and review of systems with the patient today in the office. Please see medical record for pertinent positives.       Past Medical History:   Diagnosis Date    Acid reflux     Acute MI 2004, 2008    Arthritis     Back    Broken teeth     Upper Front    CAD (coronary artery disease)     Sees Dr. Andrae Bales [facility])     per old chart    CHF (congestive heart failure) ([facility] [address]     Chronic back pain     Chronic kidney disease     STAGE 3 KIDNEY FAILURE- \"from my diabetes- do not follow with any one- have seen Dr Jada Goldstein in the past\"    Diabetes mellitus [facility]) Dx 1965    per old chart pt has been diabetic since age 13    Diabetic neuropathy ([facility] [address]     \"in my feet\"    H/O cardiovascular stress test 08/25/2016    History of irregular heartbeat     History of syncope     per old chart pt had hx syncope and dizziness for multiple yrs so ICD placed    Hyperlipidemia     Hypertension     Leg swelling     bilat---up to thighs---reduces at times with lying down    Necrotic toes (HCC)     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    Neuropathy (HCC)     both feet    PVD (peripheral vascular disease) (HCC)     Sick sinus syndrome ([facility] [address]     Sleep apnea     \"sleep study 3 yrs ago- could not tolerate the cpap made me too dry\"    Spinal stenosis     Teeth missing     Upper And Lower    Type 2 diabetes mellitus without complication [facility])       Past Surgical History:   Procedure Laterality Date    CARDIAC CATHETERIZATION      per old chart done 10/2014    CARDIAC CATHETERIZATION  07/14/2017    with angiography of leg    CARDIAC DEFIBRILLATOR PLACEMENT  06/04/2010    [employer] [address] Defibrillator Implanted    COLECTOMY Right 08/26/2016    laparascopic; robotic assisted    COLONOSCOPY  08/04/2016    CORONARY ANGIOPLASTY      \"15 Heart Stents\"    CORONARY ANGIOPLASTY WITH STENT PLACEMENT      per old chart had angio with stent to circumflex and obtuse marginal artery at [facility] 5/2010( old chart also gives hx of stent placement done 2000,2004 and 2005)   [address]      Teeth Extracted In Past    PACEMAKER PLACEMENT  06/04/2010    [employer] [address] Defibrillator Implanted    TOE AMPUTATION Right 09/12/2017    Rt 3rd toe    TOE AMPUTATION Right 01/09/2018     Right 5th toe amputation and Toenails trimmed left 2,3,4 and 5th toes    VASCULAR SURGERY      per old chart had balloon angioplasty right superfical femoral artery,right popliteal artery,,right ant.tibial artery, right tibioperoneal trunk, and right post.tibial artery wna stent placement right popliteal artery and superfical femoral artery 7/2012     Current Outpatient Prescriptions   Medication Sig Dispense Refill    losartan (COZAAR) 100 MG tablet Take 1 tablet by mouth every evening 30 tablet 3    verapamil (CALAN SR) 120 MG extended release tablet Take 1 tablet by mouth daily 30 tablet 3    chlorthalidone (HYGROTON) 25 MG tablet Take 1 tablet by mouth daily 30 tablet 3    torsemide (DEMADEX) 20 MG tablet Take 1 tablet by mouth 2 times daily 2 pills po  q am and 1 pill po q PM X 5 days   Than  1 pill po BID 90 tablet 1    senna-docusate (SENOKOT S) 8.6-50 MG per tablet Take 2 tablets by mouth daily Take 2 tabs nightly to avoid constipation. Get well soon. 60 tablet 3    HYDROcodone-acetaminophen (NORCO) 5-325 MG per tablet Take 1 tablet by mouth every 8 hours as needed for Pain .       OXYGEN Inhale into the lungs as needed Uses with exertional activities       tiotropium (SPIRIVA RESPIMAT) 1.25 MCG/ACT AERS inhaler Inhale 2 puffs into the lungs daily as needed States usually uses twice a week      clopidogrel (PLAVIX) 75 MG tablet

## 2018-04-01 ENCOUNTER — HOSPITAL ENCOUNTER (OUTPATIENT)
Dept: GENERAL RADIOLOGY | Age: 68
Discharge: HOME | End: 2018-04-30
Attending: INTERNAL MEDICINE | Admitting: INTERNAL MEDICINE

## 2018-04-13 ENCOUNTER — HOSPITAL ENCOUNTER (OUTPATIENT)
Dept: MRI IMAGING | Age: 68
Discharge: HOME | End: 2018-04-13
Attending: SURGERY | Admitting: SURGERY

## 2018-04-13 DIAGNOSIS — T81.40XA POSTOPERATIVE INFECTION, INITIAL ENCOUNTER: ICD-10-CM

## 2018-04-26 ENCOUNTER — OFFICE VISIT (OUTPATIENT)
Dept: SURGERY | Age: 68
End: 2018-04-26

## 2018-04-26 VITALS
WEIGHT: 285.06 LBS | HEIGHT: 72 IN | HEART RATE: 90 BPM | SYSTOLIC BLOOD PRESSURE: 128 MMHG | BODY MASS INDEX: 38.61 KG/M2 | DIASTOLIC BLOOD PRESSURE: 64 MMHG

## 2018-04-26 DIAGNOSIS — Z98.890 POST-OPERATIVE STATE: Primary | ICD-10-CM

## 2018-04-26 PROCEDURE — 99024 POSTOP FOLLOW-UP VISIT: CPT | Performed by: PHYSICIAN ASSISTANT

## 2018-04-26 RX ORDER — NIFEDIPINE 90 MG/1
TABLET, EXTENDED RELEASE ORAL
Refills: 3 | Status: ON HOLD | COMMUNITY
Start: 2018-04-03 | End: 2018-07-19 | Stop reason: HOSPADM

## 2018-04-26 ASSESSMENT — ENCOUNTER SYMPTOMS
COUGH: 0
ALLERGIC/IMMUNOLOGIC NEGATIVE: 1
APNEA: 0
SINUS PRESSURE: 0
EYE REDNESS: 0
SHORTNESS OF BREATH: 0
EYE DISCHARGE: 0
EYE ITCHING: 0
DIARRHEA: 0
BLOOD IN STOOL: 0
ABDOMINAL PAIN: 0

## 2018-05-15 ENCOUNTER — OFFICE VISIT (OUTPATIENT)
Dept: CARDIOLOGY CLINIC | Age: 68
End: 2018-05-15

## 2018-05-15 VITALS
WEIGHT: 278 LBS | BODY MASS INDEX: 37.65 KG/M2 | DIASTOLIC BLOOD PRESSURE: 72 MMHG | HEART RATE: 68 BPM | HEIGHT: 72 IN | SYSTOLIC BLOOD PRESSURE: 118 MMHG

## 2018-05-15 DIAGNOSIS — I73.9 PAD (PERIPHERAL ARTERY DISEASE) (HCC): Primary | ICD-10-CM

## 2018-05-15 PROCEDURE — 99214 OFFICE O/P EST MOD 30 MIN: CPT | Performed by: INTERNAL MEDICINE

## 2018-05-15 PROCEDURE — 1123F ACP DISCUSS/DSCN MKR DOCD: CPT | Performed by: INTERNAL MEDICINE

## 2018-05-15 PROCEDURE — 4040F PNEUMOC VAC/ADMIN/RCVD: CPT | Performed by: INTERNAL MEDICINE

## 2018-05-15 PROCEDURE — G8427 DOCREV CUR MEDS BY ELIG CLIN: HCPCS | Performed by: INTERNAL MEDICINE

## 2018-05-15 PROCEDURE — G8417 CALC BMI ABV UP PARAM F/U: HCPCS | Performed by: INTERNAL MEDICINE

## 2018-05-15 PROCEDURE — 1036F TOBACCO NON-USER: CPT | Performed by: INTERNAL MEDICINE

## 2018-05-15 PROCEDURE — G8598 ASA/ANTIPLAT THER USED: HCPCS | Performed by: INTERNAL MEDICINE

## 2018-05-15 PROCEDURE — 3017F COLORECTAL CA SCREEN DOC REV: CPT | Performed by: INTERNAL MEDICINE

## 2018-05-25 ENCOUNTER — TELEPHONE (OUTPATIENT)
Dept: CARDIOLOGY CLINIC | Age: 68
End: 2018-05-25

## 2018-06-18 ENCOUNTER — PROCEDURE VISIT (OUTPATIENT)
Dept: CARDIOLOGY CLINIC | Age: 68
End: 2018-06-18

## 2018-06-18 DIAGNOSIS — Z95.810 ICD (IMPLANTABLE CARDIOVERTER-DEFIBRILLATOR), DUAL, IN SITU: Primary | ICD-10-CM

## 2018-06-18 DIAGNOSIS — I49.5 SINUS NODE DYSFUNCTION (HCC): ICD-10-CM

## 2018-06-18 PROCEDURE — 93296 REM INTERROG EVL PM/IDS: CPT | Performed by: INTERNAL MEDICINE

## 2018-06-18 PROCEDURE — 93297 REM INTERROG DEV EVAL ICPMS: CPT | Performed by: INTERNAL MEDICINE

## 2018-06-18 PROCEDURE — 93295 DEV INTERROG REMOTE 1/2/MLT: CPT | Performed by: INTERNAL MEDICINE

## 2018-06-20 ENCOUNTER — TELEPHONE (OUTPATIENT)
Dept: CARDIOLOGY CLINIC | Age: 68
End: 2018-06-20

## 2018-07-16 ENCOUNTER — TELEPHONE (OUTPATIENT)
Dept: CARDIOLOGY CLINIC | Age: 68
End: 2018-07-16

## 2018-07-17 PROBLEM — I16.1 HYPERTENSIVE EMERGENCY: Status: ACTIVE | Noted: 2018-07-17

## 2018-07-18 PROBLEM — R07.2 PRECORDIAL PAIN: Status: ACTIVE | Noted: 2018-07-18

## 2018-09-04 ENCOUNTER — HOSPITAL ENCOUNTER (OUTPATIENT)
Dept: GENERAL RADIOLOGY | Age: 68
Discharge: HOME | End: 2018-09-04
Attending: INTERNAL MEDICINE | Admitting: INTERNAL MEDICINE

## 2018-09-04 LAB
ANION GAP SERPL CALCULATED.3IONS-SCNC: 9 MMOL/L (ref 4–16)
BUN BLDV-MCNC: 14 MG/DL (ref 6–23)
CALCIUM SERPL-MCNC: 9 MG/DL (ref 8.3–10.6)
CHLORIDE BLD-SCNC: 101 MMOL/L (ref 99–110)
CO2: 29 MMOL/L (ref 21–32)
CREAT SERPL-MCNC: 1.6 MG/DL (ref 0.9–1.3)
GFR AFRICAN AMERICAN: 52 ML/MIN/1.73M2
GFR NON-AFRICAN AMERICAN: 43 ML/MIN/1.73M2
GLUCOSE BLD-MCNC: 146 MG/DL (ref 70–99)
POTASSIUM SERPL-SCNC: 4.8 MMOL/L (ref 3.5–5.1)
SODIUM BLD-SCNC: 139 MMOL/L (ref 135–145)

## 2018-09-14 ENCOUNTER — OFFICE VISIT (OUTPATIENT)
Dept: CARDIOLOGY CLINIC | Age: 68
End: 2018-09-14

## 2018-09-14 VITALS
DIASTOLIC BLOOD PRESSURE: 40 MMHG | HEART RATE: 68 BPM | WEIGHT: 250 LBS | HEIGHT: 72 IN | BODY MASS INDEX: 33.86 KG/M2 | SYSTOLIC BLOOD PRESSURE: 80 MMHG

## 2018-09-14 DIAGNOSIS — I25.10 CORONARY ARTERY DISEASE INVOLVING NATIVE CORONARY ARTERY OF NATIVE HEART WITHOUT ANGINA PECTORIS: Primary | ICD-10-CM

## 2018-09-14 DIAGNOSIS — I73.9 PAD (PERIPHERAL ARTERY DISEASE) (HCC): ICD-10-CM

## 2018-09-14 DIAGNOSIS — M79.89 LEG SWELLING: ICD-10-CM

## 2018-09-14 PROCEDURE — 1101F PT FALLS ASSESS-DOCD LE1/YR: CPT | Performed by: INTERNAL MEDICINE

## 2018-09-14 PROCEDURE — 99214 OFFICE O/P EST MOD 30 MIN: CPT | Performed by: INTERNAL MEDICINE

## 2018-09-14 PROCEDURE — G8598 ASA/ANTIPLAT THER USED: HCPCS | Performed by: INTERNAL MEDICINE

## 2018-09-14 PROCEDURE — G8417 CALC BMI ABV UP PARAM F/U: HCPCS | Performed by: INTERNAL MEDICINE

## 2018-09-14 PROCEDURE — 1123F ACP DISCUSS/DSCN MKR DOCD: CPT | Performed by: INTERNAL MEDICINE

## 2018-09-14 PROCEDURE — 1036F TOBACCO NON-USER: CPT | Performed by: INTERNAL MEDICINE

## 2018-09-14 PROCEDURE — 3017F COLORECTAL CA SCREEN DOC REV: CPT | Performed by: INTERNAL MEDICINE

## 2018-09-14 PROCEDURE — G8428 CUR MEDS NOT DOCUMENT: HCPCS | Performed by: INTERNAL MEDICINE

## 2018-09-14 PROCEDURE — 4040F PNEUMOC VAC/ADMIN/RCVD: CPT | Performed by: INTERNAL MEDICINE

## 2018-09-14 RX ORDER — NIFEDIPINE 90 MG/1
90 TABLET, FILM COATED, EXTENDED RELEASE ORAL DAILY
Status: ON HOLD | COMMUNITY
End: 2019-03-12 | Stop reason: SDUPTHER

## 2018-09-14 NOTE — PROGRESS NOTES
PO Take 40 mg by mouth daily      losartan (COZAAR) 100 MG tablet TAKE 1 TABLET BY MOUTH EVERY EVENING 30 tablet 3    clopidogrel (PLAVIX) 75 MG tablet Take 1 tablet by mouth daily 30 tablet 11    gabapentin (NEURONTIN) 600 MG tablet Take 600 mg by mouth 2 times daily. Kvng Celeste insulin glargine (LANTUS SOLOSTAR) 100 UNIT/ML injection pen Inject 40 Units into the skin nightly (Patient taking differently: Inject 44 Units into the skin nightly ) 5 Pen 3     No current facility-administered medications for this visit.       Allergies: Pcn [penicillins] and Fentanyl  Past Medical History:   Diagnosis Date    Acid reflux     Acute MI ([facility] 2004, 2008    Arthritis     Back    Broken teeth     Upper Front    CAD (coronary artery disease)     Sees Dr. Denny Moreno [facility])     per old chart    CHF (congestive heart failure) ([facility]     Chronic back pain     Chronic kidney disease     STAGE 3 KIDNEY FAILURE- \"from my diabetes- do not follow with any one- have seen Dr Jayme Hirsch in the past\"    [facility] D[facility]    per old chart pt has been diabetic since age 13    Diabetic neuropathy ([facility]     \"in my feet\"    H/O cardiovascular stress test 08/25/2016    History of irregular heartbeat     History of syncope     per old chart pt had hx syncope and dizziness for multiple yrs so ICD placed    Hyperlipidemia     Hypertension     Leg swelling     bilat---up to thighs---reduces at times with lying down    Necrotic toes (HCC)     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    Neuropathy     both feet    neuropathy     PVD (peripheral vascular disease) (HCC)     Sick sinus syndrome (HCC)     Sleep apnea     \"sleep study 3 yrs ago- could not tolerate the cpap made me too dry\"    Spinal stenosis     Teeth missing     Upper And Lower    Type 2 diabetes mellitus without complication ([facility]      Past Surgical History:   Procedure Laterality Date    CARDIAC CATHETERIZATION      per old chart done 10/2014    CARDIAC CATHETERIZATION  07/14/2017    with angiography of leg    CARDIAC DEFIBRILLATOR PLACEMENT  06/04/2010    [employer] [address] [employer] Defibrillator Implanted    COLECTOMY Right 08/26/2016    laparascopic; robotic assisted    COLONOSCOPY  08/04/2016    CORONARY ANGIOPLASTY      \"15 Heart Stents\"    CORONARY ANGIOPLASTY WITH STENT PLACEMENT      per old chart had angio with stent to circumflex and obtuse marginal artery at [facility] 5/2010( old chart also gives hx of stent placement done 2000,2004 and 2005)   [address]      Teeth Extracted In Past    PACEMAKER PLACEMENT  06/04/2010    [employer] [address] Defibrillator Implanted    TOE AMPUTATION Right 09/12/2017    Rt 3rd toe    TOE AMPUTATION Right 01/09/2018     Right 5th toe amputation and Toenails trimmed left 2,3,4 and 5th toes    VASCULAR SURGERY      per old chart had balloon angioplasty right superfical femoral artery,right popliteal artery,,right ant.tibial artery, right tibioperoneal trunk, and right post.tibial artery wna stent placement right popliteal artery and superfical femoral artery 7/2012     Family History   Problem Relation Age of Onset    Diabetes Mother     Stroke Mother     High Blood Pressure Mother     Vision Loss Mother     Cancer Father         Prostate Cancer    Diabetes Sister     Neuropathy Sister     Other Sister         \"Breathing Problems\"    Heart Disease Sister     Early Death Sister 62        Heart Complications    Cancer Brother         \"Stomach Cancer\"    High Blood Pressure Brother     Diabetes Brother     Heart Disease Brother     High Blood Pressure Brother     Cancer Son         \"Testicle Cancer\"     Social History   Substance Use Topics    Smoking status: Former Smoker     Packs/day: 0.00     Years: 36.00     Start date: 1980    Smokeless tobacco: Never Used    Alcohol use No      [unfilled]  Review of Systems:   · Constitutional: No Fever or Weight Loss   · Eyes: No Decreased ([facility] Sick sinus syndrome ([facility] Sleep apnea; Spinal stenosis; Teeth missing; and Type 2 diabetes mellitus without complication ([facility] and presents with     Plan:  1. wosening cardiomyopathy with CKD: [facility] scheduled,risk and benefit explained and consent obtained, patient would let me know when he is ready, will admit the patient to hospital a night before for IV hydration. 2. CAD: as above, he will need [facility], continue all medications  3. Leg swelling: worsening renal failure and cardiomyopathy induced, will get venous doppler to check for venous insufficiency  4. HYPOTENSION: D/C CLONIDINE  5. CKD: stable  6. PAD: healed rt foot post sx, will recheck lower extremity arterial doppler  7. Common cold: use OTC medications  8. ICD: stable generator changE  9. Dm:recommend strict control  10. .All labs, medications and All labs, medications and tests reviewed, continue all other medications of all above medical condition listed as is.     [unfilled]

## 2018-09-17 ENCOUNTER — PROCEDURE VISIT (OUTPATIENT)
Dept: CARDIOLOGY CLINIC | Age: 68
End: 2018-09-17

## 2018-09-17 DIAGNOSIS — I49.5 SINUS NODE DYSFUNCTION (HCC): ICD-10-CM

## 2018-09-17 DIAGNOSIS — Z95.810 ICD (IMPLANTABLE CARDIOVERTER-DEFIBRILLATOR), DUAL, IN SITU: Primary | ICD-10-CM

## 2018-09-17 PROCEDURE — 93297 REM INTERROG DEV EVAL ICPMS: CPT | Performed by: INTERNAL MEDICINE

## 2018-09-17 PROCEDURE — 93295 DEV INTERROG REMOTE 1/2/MLT: CPT | Performed by: INTERNAL MEDICINE

## 2018-09-17 PROCEDURE — 93296 REM INTERROG EVL PM/IDS: CPT | Performed by: INTERNAL MEDICINE

## 2018-09-26 ENCOUNTER — PROCEDURE VISIT (OUTPATIENT)
Dept: CARDIOLOGY CLINIC | Age: 68
End: 2018-09-26
Payer: MEDICARE

## 2018-09-26 DIAGNOSIS — M79.89 LEG SWELLING: Primary | ICD-10-CM

## 2018-09-26 DIAGNOSIS — I73.9 PAD (PERIPHERAL ARTERY DISEASE) (HCC): ICD-10-CM

## 2018-09-26 DIAGNOSIS — I73.9 PAD (PERIPHERAL ARTERY DISEASE) (HCC): Primary | ICD-10-CM

## 2018-09-26 PROCEDURE — 93925 LOWER EXTREMITY STUDY: CPT | Performed by: INTERNAL MEDICINE

## 2018-09-26 PROCEDURE — 93970 EXTREMITY STUDY: CPT | Performed by: INTERNAL MEDICINE

## 2018-09-26 PROCEDURE — 93922 UPR/L XTREMITY ART 2 LEVELS: CPT | Performed by: INTERNAL MEDICINE

## 2018-09-28 ENCOUNTER — TELEPHONE (OUTPATIENT)
Dept: CARDIOLOGY CLINIC | Age: 68
End: 2018-09-28

## 2018-09-28 NOTE — TELEPHONE ENCOUNTER
Left message to go over testing with pt. He needs an appt in 3-4 weeks with Dr. Nicole Bolivar to go over the testing      No evidence of DVT or SVT in the bilateral common femoral vein, femoral    vein, popliteal vein, greater saphenous vein or small saphenous vein.    Significant reflux noted of the Right CFV, Pop V, and GSV at the level of    the SFJ (2.3s).  Significant reflux noted in the Left CFV and the Left GSV at the level of    the SFJ (1.1s) and the knee (1.1s).         Recommendations        office visit to discuss results in 3-4 weeks             Moderate disease of the right lower extremity with an JALEN of 0.72.    Moderate to severe disease of the left lower extremity with an JALEN of 0.55.        Recommendations        OFFICE VISIT IN 3-4 WEEKS to discuss results

## 2018-10-24 ENCOUNTER — OFFICE VISIT (OUTPATIENT)
Dept: CARDIOLOGY CLINIC | Age: 68
End: 2018-10-24
Payer: MEDICARE

## 2018-10-24 VITALS
HEART RATE: 64 BPM | RESPIRATION RATE: 16 BRPM | BODY MASS INDEX: 36.62 KG/M2 | SYSTOLIC BLOOD PRESSURE: 124 MMHG | WEIGHT: 270 LBS | DIASTOLIC BLOOD PRESSURE: 72 MMHG

## 2018-10-24 DIAGNOSIS — I10 ESSENTIAL HYPERTENSION: ICD-10-CM

## 2018-10-24 DIAGNOSIS — I25.10 CORONARY ARTERY DISEASE INVOLVING NATIVE CORONARY ARTERY OF NATIVE HEART WITHOUT ANGINA PECTORIS: Primary | ICD-10-CM

## 2018-10-24 DIAGNOSIS — E78.5 HYPERLIPIDEMIA, UNSPECIFIED HYPERLIPIDEMIA TYPE: ICD-10-CM

## 2018-10-24 PROCEDURE — 4040F PNEUMOC VAC/ADMIN/RCVD: CPT | Performed by: INTERNAL MEDICINE

## 2018-10-24 PROCEDURE — 1123F ACP DISCUSS/DSCN MKR DOCD: CPT | Performed by: INTERNAL MEDICINE

## 2018-10-24 PROCEDURE — 1101F PT FALLS ASSESS-DOCD LE1/YR: CPT | Performed by: INTERNAL MEDICINE

## 2018-10-24 PROCEDURE — 3017F COLORECTAL CA SCREEN DOC REV: CPT | Performed by: INTERNAL MEDICINE

## 2018-10-24 PROCEDURE — G8598 ASA/ANTIPLAT THER USED: HCPCS | Performed by: INTERNAL MEDICINE

## 2018-10-24 PROCEDURE — 1036F TOBACCO NON-USER: CPT | Performed by: INTERNAL MEDICINE

## 2018-10-24 PROCEDURE — G8417 CALC BMI ABV UP PARAM F/U: HCPCS | Performed by: INTERNAL MEDICINE

## 2018-10-24 PROCEDURE — G8484 FLU IMMUNIZE NO ADMIN: HCPCS | Performed by: INTERNAL MEDICINE

## 2018-10-24 PROCEDURE — G8427 DOCREV CUR MEDS BY ELIG CLIN: HCPCS | Performed by: INTERNAL MEDICINE

## 2018-10-24 PROCEDURE — 99214 OFFICE O/P EST MOD 30 MIN: CPT | Performed by: INTERNAL MEDICINE

## 2018-10-24 NOTE — PROGRESS NOTES
Jacquelin Sexton MD        OFFICE  FOLLOWUP NOTE    Chief complaints: patient is here for management of CAD, ICD, DM, HTN,PAD, DYSLPIDEMIA, CKD    Subjective: patient feels better, no chest pain, no shortness of breath, no dizziness, no palpitations    HPI Paloma Nunn is a 76 y. o.year old who  has a past medical history of Acid reflux; Acute MI ([facility] Arthritis; Broken teeth; CAD (coronary artery disease); Cardiomyopathy [facility]); CHF (congestive heart failure) ([facility] Chronic back pain; Chronic kidney disease; Diabetes mellitus ([facility] Diabetic neuropathy ([facility] H/O cardiovascular stress test; H/O Doppler ultrasound; History of irregular heartbeat; History of syncope; Hyperlipidemia; Hypertension; Leg swelling; Necrotic toes ([facility] Neuropathy; neuropathy; PAD [facility]peripheral artery disease) ([facility] PVD (peripheral vascular disease) ([facility] Sick sinus syndrome ([facility] Sleep apnea; Spinal stenosis; Teeth missing; and Type 2 diabetes mellitus without complication ([facility]  and presents for management of CAD, ICD, DM, HTN,PAD, DYSLPIDEMIA, CKD which are well controlled      Current Outpatient Prescriptions   Medication Sig Dispense Refill    NIFEdipine (ADALAT CC) 90 MG extended release tablet Take 90 mg by mouth daily      doxazosin (CARDURA) 2 MG tablet Take 1 tablet by mouth 2 times daily 60 tablet 0    aspirin 81 MG chewable tablet Take 1 tablet by mouth daily 30 tablet 0    amLODIPine (NORVASC) 10 MG tablet Take 1 tablet by mouth daily 30 tablet 0    atorvastatin (LIPITOR) 20 MG tablet Take 1 tablet by mouth nightly 30 tablet 0    spironolactone (ALDACTONE) 25 MG tablet Take 1 tablet by mouth daily 30 tablet 0    insulin aspart (NOVOLOG) 100 UNIT/ML injection vial Inject into the skin 3 times daily (before meals) 07/17/18 Patient states he gives himself 5 units if BS > 151 states had 5 units this am      TORSEMIDE PO Take 40 mg by mouth daily      losartan (COZAAR) 100 MG tablet TAKE 1 TABLET BY MOUTH EVERY

## 2018-11-09 ENCOUNTER — HOSPITAL ENCOUNTER (OUTPATIENT)
Dept: GENERAL RADIOLOGY | Age: 68
Discharge: HOME | End: 2018-11-09
Payer: MEDICARE

## 2018-11-09 ENCOUNTER — HOSPITAL ENCOUNTER (OUTPATIENT)
Age: 68
Discharge: HOME | End: 2018-11-09
Payer: MEDICARE

## 2018-11-09 DIAGNOSIS — Z01.818 PRE-PROCEDURAL EXAMINATION: ICD-10-CM

## 2018-11-09 LAB
ANION GAP SERPL CALCULATED.3IONS-SCNC: 11 MMOL/L (ref 4–16)
APTT: 32 SECONDS (ref 21.2–33)
BUN BLDV-MCNC: 21 MG/DL (ref 6–23)
CALCIUM SERPL-MCNC: 8.9 MG/DL (ref 8.3–10.6)
CHLORIDE BLD-SCNC: 101 MMOL/L (ref 99–110)
CO2: 27 MMOL/L (ref 21–32)
CREAT SERPL-MCNC: 1.4 MG/DL (ref 0.9–1.3)
EKG ATRIAL RATE: 61 BPM
EKG DIAGNOSIS: NORMAL
EKG P AXIS: 26 DEGREES
EKG P-R INTERVAL: 270 MS
EKG Q-T INTERVAL: 430 MS
EKG QRS DURATION: 92 MS
EKG QTC CALCULATION (BAZETT): 432 MS
EKG R AXIS: 8 DEGREES
EKG T AXIS: 106 DEGREES
EKG VENTRICULAR RATE: 61 BPM
GFR AFRICAN AMERICAN: >60 ML/MIN/1.73M2
GFR NON-AFRICAN AMERICAN: 50 ML/MIN/1.73M2
GLUCOSE BLD-MCNC: 175 MG/DL (ref 70–99)
HCT VFR BLD CALC: 41.3 % (ref 42–52)
HEMOGLOBIN: 13 GM/DL (ref 13.5–18)
INR BLD: 0.99 INDEX
MCH RBC QN AUTO: 31.2 PG (ref 27–31)
MCHC RBC AUTO-ENTMCNC: 31.5 % (ref 32–36)
MCV RBC AUTO: 99 FL (ref 78–100)
PDW BLD-RTO: 12.7 % (ref 11.7–14.9)
PLATELET # BLD: 216 K/CU MM (ref 140–440)
PMV BLD AUTO: 10 FL (ref 7.5–11.1)
POTASSIUM SERPL-SCNC: 5.5 MMOL/L (ref 3.5–5.1)
PROTHROMBIN TIME: 11.3 SECONDS (ref 9.12–12.5)
RBC # BLD: 4.17 M/CU MM (ref 4.6–6.2)
SODIUM BLD-SCNC: 139 MMOL/L (ref 135–145)
WBC # BLD: 8.2 K/CU MM (ref 4–10.5)

## 2018-11-09 PROCEDURE — 85027 COMPLETE CBC AUTOMATED: CPT

## 2018-11-09 PROCEDURE — 86900 BLOOD TYPING SEROLOGIC ABO: CPT

## 2018-11-09 PROCEDURE — 93005 ELECTROCARDIOGRAM TRACING: CPT

## 2018-11-09 PROCEDURE — 80048 BASIC METABOLIC PNL TOTAL CA: CPT

## 2018-11-09 PROCEDURE — 36415 COLL VENOUS BLD VENIPUNCTURE: CPT

## 2018-11-09 PROCEDURE — 85730 THROMBOPLASTIN TIME PARTIAL: CPT

## 2018-11-09 PROCEDURE — 71046 X-RAY EXAM CHEST 2 VIEWS: CPT

## 2018-11-09 PROCEDURE — 85610 PROTHROMBIN TIME: CPT

## 2018-11-09 PROCEDURE — 86850 RBC ANTIBODY SCREEN: CPT

## 2018-11-09 PROCEDURE — 86901 BLOOD TYPING SEROLOGIC RH(D): CPT

## 2018-11-13 ENCOUNTER — HOSPITAL ENCOUNTER (EMERGENCY)
Age: 68
Discharge: HOME | End: 2018-11-13
Attending: EMERGENCY MEDICINE
Payer: MEDICARE

## 2018-11-13 ENCOUNTER — APPOINTMENT (OUTPATIENT)
Dept: GENERAL RADIOLOGY | Age: 68
End: 2018-11-13
Payer: MEDICARE

## 2018-11-13 VITALS
BODY MASS INDEX: 35.21 KG/M2 | TEMPERATURE: 97.9 F | HEIGHT: 72 IN | DIASTOLIC BLOOD PRESSURE: 76 MMHG | WEIGHT: 260 LBS | HEART RATE: 63 BPM | RESPIRATION RATE: 13 BRPM | OXYGEN SATURATION: 98 % | SYSTOLIC BLOOD PRESSURE: 152 MMHG

## 2018-11-13 DIAGNOSIS — R07.9 CHEST PAIN, UNSPECIFIED TYPE: ICD-10-CM

## 2018-11-13 DIAGNOSIS — I10 ESSENTIAL HYPERTENSION: Primary | ICD-10-CM

## 2018-11-13 LAB
ALBUMIN SERPL-MCNC: 3.6 GM/DL (ref 3.4–5)
ALP BLD-CCNC: 97 IU/L (ref 40–129)
ALT SERPL-CCNC: 7 U/L (ref 10–40)
ANION GAP SERPL CALCULATED.3IONS-SCNC: 7 MMOL/L (ref 4–16)
AST SERPL-CCNC: 11 IU/L (ref 15–37)
BASOPHILS ABSOLUTE: 0 K/CU MM
BASOPHILS RELATIVE PERCENT: 0.3 % (ref 0–1)
BILIRUB SERPL-MCNC: 0.3 MG/DL (ref 0–1)
BUN BLDV-MCNC: 20 MG/DL (ref 6–23)
CALCIUM SERPL-MCNC: 8.6 MG/DL (ref 8.3–10.6)
CHLORIDE BLD-SCNC: 103 MMOL/L (ref 99–110)
CO2: 26 MMOL/L (ref 21–32)
CREAT SERPL-MCNC: 1.4 MG/DL (ref 0.9–1.3)
DIFFERENTIAL TYPE: ABNORMAL
EOSINOPHILS ABSOLUTE: 0.2 K/CU MM
EOSINOPHILS RELATIVE PERCENT: 2.5 % (ref 0–3)
GFR AFRICAN AMERICAN: >60 ML/MIN/1.73M2
GFR NON-AFRICAN AMERICAN: 50 ML/MIN/1.73M2
GLUCOSE BLD-MCNC: 208 MG/DL (ref 70–99)
HCT VFR BLD CALC: 36.1 % (ref 42–52)
HEMOGLOBIN: 11.3 GM/DL (ref 13.5–18)
IMMATURE NEUTROPHIL %: 0.3 % (ref 0–0.43)
LYMPHOCYTES ABSOLUTE: 1.3 K/CU MM
LYMPHOCYTES RELATIVE PERCENT: 20.8 % (ref 24–44)
MCH RBC QN AUTO: 31.2 PG (ref 27–31)
MCHC RBC AUTO-ENTMCNC: 31.3 % (ref 32–36)
MCV RBC AUTO: 99.7 FL (ref 78–100)
MONOCYTES ABSOLUTE: 0.3 K/CU MM
MONOCYTES RELATIVE PERCENT: 5.1 % (ref 0–4)
NUCLEATED RBC %: 0 %
PDW BLD-RTO: 12.5 % (ref 11.7–14.9)
PLATELET # BLD: 181 K/CU MM (ref 140–440)
PMV BLD AUTO: 9.9 FL (ref 7.5–11.1)
POTASSIUM SERPL-SCNC: 5 MMOL/L (ref 3.5–5.1)
PRO-BNP: 394.2 PG/ML
RBC # BLD: 3.62 M/CU MM (ref 4.6–6.2)
SEGMENTED NEUTROPHILS ABSOLUTE COUNT: 4.5 K/CU MM
SEGMENTED NEUTROPHILS RELATIVE PERCENT: 71 % (ref 36–66)
SODIUM BLD-SCNC: 136 MMOL/L (ref 135–145)
TOTAL IMMATURE NEUTOROPHIL: 0.02 K/CU MM
TOTAL NUCLEATED RBC: 0 K/CU MM
TOTAL PROTEIN: 6.4 GM/DL (ref 6.4–8.2)
TROPONIN T: <0.01 NG/ML
TROPONIN T: <0.01 NG/ML
WBC # BLD: 6.3 K/CU MM (ref 4–10.5)

## 2018-11-13 PROCEDURE — 84484 ASSAY OF TROPONIN QUANT: CPT

## 2018-11-13 PROCEDURE — 80053 COMPREHEN METABOLIC PANEL: CPT

## 2018-11-13 PROCEDURE — 36415 COLL VENOUS BLD VENIPUNCTURE: CPT

## 2018-11-13 PROCEDURE — 85025 COMPLETE CBC W/AUTO DIFF WBC: CPT

## 2018-11-13 PROCEDURE — 71046 X-RAY EXAM CHEST 2 VIEWS: CPT

## 2018-11-13 PROCEDURE — 83880 ASSAY OF NATRIURETIC PEPTIDE: CPT

## 2018-11-13 PROCEDURE — 6370000000 HC RX 637 (ALT 250 FOR IP): Performed by: PHYSICIAN ASSISTANT

## 2018-11-13 PROCEDURE — 6370000000 HC RX 637 (ALT 250 FOR IP): Performed by: EMERGENCY MEDICINE

## 2018-11-13 PROCEDURE — 93010 ELECTROCARDIOGRAM REPORT: CPT | Performed by: INTERNAL MEDICINE

## 2018-11-13 PROCEDURE — 93005 ELECTROCARDIOGRAM TRACING: CPT | Performed by: PHYSICIAN ASSISTANT

## 2018-11-13 PROCEDURE — 99284 EMERGENCY DEPT VISIT MOD MDM: CPT

## 2018-11-13 RX ORDER — HYDROCODONE BITARTRATE AND ACETAMINOPHEN 5; 325 MG/1; MG/1
1 TABLET ORAL ONCE
Status: COMPLETED | OUTPATIENT
Start: 2018-11-13 | End: 2018-11-13

## 2018-11-13 RX ORDER — FUROSEMIDE 40 MG/1
40 TABLET ORAL DAILY
Status: ON HOLD | COMMUNITY
End: 2018-11-20 | Stop reason: HOSPADM

## 2018-11-13 RX ORDER — CARVEDILOL 25 MG/1
25 TABLET ORAL 2 TIMES DAILY WITH MEALS
Status: ON HOLD | COMMUNITY
End: 2019-03-12 | Stop reason: SDUPTHER

## 2018-11-13 RX ORDER — ASPIRIN 81 MG/1
324 TABLET, CHEWABLE ORAL ONCE
Status: COMPLETED | OUTPATIENT
Start: 2018-11-13 | End: 2018-11-13

## 2018-11-13 RX ORDER — VERAPAMIL HYDROCHLORIDE 120 MG/1
120 CAPSULE, EXTENDED RELEASE ORAL NIGHTLY
Status: ON HOLD | COMMUNITY
End: 2018-11-20 | Stop reason: HOSPADM

## 2018-11-13 RX ADMIN — HYDROCODONE BITARTRATE AND ACETAMINOPHEN 1 TABLET: 5; 325 TABLET ORAL at 16:22

## 2018-11-13 RX ADMIN — ASPIRIN 324 MG: 81 TABLET, CHEWABLE ORAL at 14:59

## 2018-11-13 ASSESSMENT — PAIN DESCRIPTION - PAIN TYPE: TYPE: CHRONIC PAIN

## 2018-11-13 ASSESSMENT — PAIN SCALES - GENERAL
PAINLEVEL_OUTOF10: 10
PAINLEVEL_OUTOF10: 10

## 2018-11-13 ASSESSMENT — PAIN DESCRIPTION - LOCATION: LOCATION: FOOT

## 2018-11-13 NOTE — ED PROVIDER NOTES
obtuse marginal artery at [facility] 5/2010( old chart also gives hx of stent placement done 2000,2004 and 2005)    DENTAL SURGERY      Teeth Extracted In Past    PACEMAKER PLACEMENT  06/04/2010    [employer] [employer] Defibrillator Implanted    TOE AMPUTATION Right 09/12/2017    Rt 3rd toe    TOE AMPUTATION Right 01/09/2018     Right 5th toe amputation and Toenails trimmed left 2,3,4 and 5th toes    VASCULAR SURGERY      per old chart had balloon angioplasty right superfical femoral artery,right popliteal artery,,right ant.tibial artery, right tibioperoneal trunk, and right post.tibial artery wna stent placement right popliteal artery and superfical femoral artery 7/2012       CURRENT MEDICATIONS    Current Outpatient Rx   Medication Sig Dispense Refill    carvedilol (COREG) 25 MG tablet Take 25 mg by mouth 2 times daily (with meals)      furosemide (LASIX) 40 MG tablet Take 40 mg by mouth daily      verapamil (VERELAN) 120 MG extended release capsule Take 120 mg by mouth nightly      losartan (COZAAR) 100 MG tablet Take 1 tablet by mouth every evening 30 tablet 5    NIFEdipine (ADALAT CC) 90 MG extended release tablet Take 90 mg by mouth daily      doxazosin (CARDURA) 2 MG tablet Take 1 tablet by mouth 2 times daily 60 tablet 0    aspirin 81 MG chewable tablet Take 1 tablet by mouth daily 30 tablet 0    amLODIPine (NORVASC) 10 MG tablet Take 1 tablet by mouth daily 30 tablet 0    atorvastatin (LIPITOR) 20 MG tablet Take 1 tablet by mouth nightly 30 tablet 0    spironolactone (ALDACTONE) 25 MG tablet Take 1 tablet by mouth daily 30 tablet 0    insulin aspart (NOVOLOG) 100 UNIT/ML injection vial Inject into the skin 3 times daily (before meals) 07/17/18 Patient states he gives himself 5 units if BS > 151 states had 5 units this am      clopidogrel (PLAVIX) 75 MG tablet Take 1 tablet by mouth daily 30 tablet 11    gabapentin (NEURONTIN) 600 MG tablet Take 600 mg by mouth 2 times daily.  .      insulin

## 2018-11-13 NOTE — ED NOTES
Discharge instructions reviewed. Pt verbalized understanding.        Blanca Terrell RN  11/13/18 0662

## 2018-11-15 LAB
EKG ATRIAL RATE: 60 BPM
EKG DIAGNOSIS: NORMAL
EKG P AXIS: 26 DEGREES
EKG P-R INTERVAL: 302 MS
EKG Q-T INTERVAL: 428 MS
EKG QRS DURATION: 98 MS
EKG QTC CALCULATION (BAZETT): 428 MS
EKG R AXIS: 40 DEGREES
EKG T AXIS: -89 DEGREES
EKG VENTRICULAR RATE: 60 BPM

## 2018-11-16 ENCOUNTER — TELEPHONE (OUTPATIENT)
Dept: CARDIOLOGY CLINIC | Age: 68
End: 2018-11-16

## 2018-11-16 NOTE — TELEPHONE ENCOUNTER
Called patient to let him know admission set up for 11/19/18 at 5 pm at [facility] for [address] [facility] 11/20/18. Left message for patient to return call.  Patient to arrive at registration at [facility] 11/19/18 no later than 5 pm.

## 2018-11-19 ENCOUNTER — APPOINTMENT (OUTPATIENT)
Dept: GENERAL RADIOLOGY | Age: 68
DRG: 287 | End: 2018-11-19
Attending: INTERNAL MEDICINE
Payer: MEDICARE

## 2018-11-19 ENCOUNTER — HOSPITAL ENCOUNTER (INPATIENT)
Age: 68
LOS: 1 days | Discharge: HOME | DRG: 287 | End: 2018-11-20
Attending: INTERNAL MEDICINE | Admitting: INTERNAL MEDICINE
Payer: MEDICARE

## 2018-11-19 PROBLEM — I20.0 UNSTABLE ANGINA (HCC): Status: ACTIVE | Noted: 2018-11-19

## 2018-11-19 LAB
ANION GAP SERPL CALCULATED.3IONS-SCNC: 6 MMOL/L (ref 4–16)
BACTERIA: NEGATIVE /HPF
BILIRUBIN URINE: NEGATIVE MG/DL
BLOOD, URINE: NEGATIVE
BUN BLDV-MCNC: 16 MG/DL (ref 6–23)
CALCIUM SERPL-MCNC: 9 MG/DL (ref 8.3–10.6)
CHLORIDE BLD-SCNC: 103 MMOL/L (ref 99–110)
CLARITY: CLEAR
CO2: 32 MMOL/L (ref 21–32)
COLOR: YELLOW
CREAT SERPL-MCNC: 1.4 MG/DL (ref 0.9–1.3)
GFR AFRICAN AMERICAN: >60 ML/MIN/1.73M2
GFR NON-AFRICAN AMERICAN: 50 ML/MIN/1.73M2
GLUCOSE BLD-MCNC: 129 MG/DL (ref 70–99)
GLUCOSE BLD-MCNC: 135 MG/DL (ref 70–99)
GLUCOSE BLD-MCNC: 184 MG/DL (ref 70–99)
GLUCOSE, URINE: NEGATIVE MG/DL
HCT VFR BLD CALC: 34.8 % (ref 42–52)
HEMOGLOBIN: 11.5 GM/DL (ref 13.5–18)
INR BLD: 1.03 INDEX
KETONES, URINE: NEGATIVE MG/DL
LEUKOCYTE ESTERASE, URINE: NEGATIVE
MCH RBC QN AUTO: 31.3 PG (ref 27–31)
MCHC RBC AUTO-ENTMCNC: 33 % (ref 32–36)
MCV RBC AUTO: 94.8 FL (ref 78–100)
NITRITE URINE, QUANTITATIVE: NEGATIVE
PDW BLD-RTO: 12.2 % (ref 11.7–14.9)
PH, URINE: 5 (ref 5–8)
PLATELET # BLD: 169 K/CU MM (ref 140–440)
PMV BLD AUTO: 10 FL (ref 7.5–11.1)
POTASSIUM SERPL-SCNC: 4.8 MMOL/L (ref 3.5–5.1)
PROTEIN UA: NEGATIVE MG/DL
PROTHROMBIN TIME: 11.7 SECONDS (ref 9.12–12.5)
RBC # BLD: 3.67 M/CU MM (ref 4.6–6.2)
RBC URINE: 1 /HPF (ref 0–3)
SODIUM BLD-SCNC: 141 MMOL/L (ref 135–145)
SPECIFIC GRAVITY UA: 1.01 (ref 1–1.03)
SQUAMOUS EPITHELIAL: <1 /HPF
TRICHOMONAS: NORMAL /HPF
TROPONIN T: 0.01 NG/ML
TROPONIN T: <0.01 NG/ML
UROBILINOGEN, URINE: NORMAL MG/DL (ref 0.2–1)
WBC # BLD: 6.8 K/CU MM (ref 4–10.5)
WBC UA: <1 /HPF (ref 0–2)

## 2018-11-19 PROCEDURE — 2580000003 HC RX 258: Performed by: NURSE PRACTITIONER

## 2018-11-19 PROCEDURE — S0028 INJECTION, FAMOTIDINE, 20 MG: HCPCS | Performed by: NURSE PRACTITIONER

## 2018-11-19 PROCEDURE — 71045 X-RAY EXAM CHEST 1 VIEW: CPT

## 2018-11-19 PROCEDURE — 85610 PROTHROMBIN TIME: CPT

## 2018-11-19 PROCEDURE — 96374 THER/PROPH/DIAG INJ IV PUSH: CPT

## 2018-11-19 PROCEDURE — 81001 URINALYSIS AUTO W/SCOPE: CPT

## 2018-11-19 PROCEDURE — 85027 COMPLETE CBC AUTOMATED: CPT

## 2018-11-19 PROCEDURE — 6370000000 HC RX 637 (ALT 250 FOR IP): Performed by: NURSE PRACTITIONER

## 2018-11-19 PROCEDURE — 1200000000 HC SEMI PRIVATE

## 2018-11-19 PROCEDURE — 36415 COLL VENOUS BLD VENIPUNCTURE: CPT

## 2018-11-19 PROCEDURE — 84484 ASSAY OF TROPONIN QUANT: CPT

## 2018-11-19 PROCEDURE — 82962 GLUCOSE BLOOD TEST: CPT

## 2018-11-19 PROCEDURE — 80048 BASIC METABOLIC PNL TOTAL CA: CPT

## 2018-11-19 PROCEDURE — G0378 HOSPITAL OBSERVATION PER HR: HCPCS

## 2018-11-19 PROCEDURE — 2500000003 HC RX 250 WO HCPCS: Performed by: NURSE PRACTITIONER

## 2018-11-19 RX ORDER — GABAPENTIN 300 MG/1
600 CAPSULE ORAL 2 TIMES DAILY
Status: DISCONTINUED | OUTPATIENT
Start: 2018-11-19 | End: 2018-11-20 | Stop reason: HOSPADM

## 2018-11-19 RX ORDER — DOXAZOSIN MESYLATE 1 MG/1
2 TABLET ORAL 2 TIMES DAILY
Status: DISCONTINUED | OUTPATIENT
Start: 2018-11-19 | End: 2018-11-20 | Stop reason: HOSPADM

## 2018-11-19 RX ORDER — HYDROCODONE BITARTRATE AND ACETAMINOPHEN 5; 325 MG/1; MG/1
1 TABLET ORAL EVERY 6 HOURS PRN
Status: DISCONTINUED | OUTPATIENT
Start: 2018-11-19 | End: 2018-11-20 | Stop reason: HOSPADM

## 2018-11-19 RX ORDER — SODIUM CHLORIDE 9 MG/ML
INJECTION, SOLUTION INTRAVENOUS CONTINUOUS
Status: DISCONTINUED | OUTPATIENT
Start: 2018-11-19 | End: 2018-11-20 | Stop reason: HOSPADM

## 2018-11-19 RX ORDER — DEXTROSE MONOHYDRATE 25 G/50ML
12.5 INJECTION, SOLUTION INTRAVENOUS PRN
Status: DISCONTINUED | OUTPATIENT
Start: 2018-11-19 | End: 2018-11-20 | Stop reason: HOSPADM

## 2018-11-19 RX ORDER — NITROGLYCERIN 0.4 MG/1
0.4 TABLET SUBLINGUAL EVERY 5 MIN PRN
Status: DISCONTINUED | OUTPATIENT
Start: 2018-11-19 | End: 2018-11-20 | Stop reason: HOSPADM

## 2018-11-19 RX ORDER — CARVEDILOL 25 MG/1
25 TABLET ORAL 2 TIMES DAILY WITH MEALS
Status: DISCONTINUED | OUTPATIENT
Start: 2018-11-19 | End: 2018-11-20 | Stop reason: HOSPADM

## 2018-11-19 RX ORDER — NIFEDIPINE 30 MG/1
90 TABLET, EXTENDED RELEASE ORAL DAILY
Status: DISCONTINUED | OUTPATIENT
Start: 2018-11-19 | End: 2018-11-20 | Stop reason: HOSPADM

## 2018-11-19 RX ORDER — DEXTROSE MONOHYDRATE 50 MG/ML
100 INJECTION, SOLUTION INTRAVENOUS PRN
Status: DISCONTINUED | OUTPATIENT
Start: 2018-11-19 | End: 2018-11-20 | Stop reason: HOSPADM

## 2018-11-19 RX ORDER — SODIUM CHLORIDE 0.9 % (FLUSH) 0.9 %
10 SYRINGE (ML) INJECTION EVERY 12 HOURS SCHEDULED
Status: DISCONTINUED | OUTPATIENT
Start: 2018-11-19 | End: 2018-11-20 | Stop reason: HOSPADM

## 2018-11-19 RX ORDER — ATORVASTATIN CALCIUM 20 MG/1
20 TABLET, FILM COATED ORAL NIGHTLY
Status: DISCONTINUED | OUTPATIENT
Start: 2018-11-19 | End: 2018-11-20 | Stop reason: HOSPADM

## 2018-11-19 RX ORDER — CLOPIDOGREL BISULFATE 75 MG/1
75 TABLET ORAL DAILY
Status: DISCONTINUED | OUTPATIENT
Start: 2018-11-19 | End: 2018-11-20 | Stop reason: HOSPADM

## 2018-11-19 RX ORDER — NICOTINE POLACRILEX 4 MG
15 LOZENGE BUCCAL PRN
Status: DISCONTINUED | OUTPATIENT
Start: 2018-11-19 | End: 2018-11-20 | Stop reason: HOSPADM

## 2018-11-19 RX ORDER — SODIUM CHLORIDE 0.9 % (FLUSH) 0.9 %
10 SYRINGE (ML) INJECTION PRN
Status: DISCONTINUED | OUTPATIENT
Start: 2018-11-19 | End: 2018-11-20 | Stop reason: HOSPADM

## 2018-11-19 RX ORDER — ASPIRIN 81 MG/1
81 TABLET, CHEWABLE ORAL DAILY
Status: DISCONTINUED | OUTPATIENT
Start: 2018-11-19 | End: 2018-11-20 | Stop reason: HOSPADM

## 2018-11-19 RX ORDER — INSULIN GLARGINE 100 [IU]/ML
44 INJECTION, SOLUTION SUBCUTANEOUS NIGHTLY
Status: DISCONTINUED | OUTPATIENT
Start: 2018-11-19 | End: 2018-11-20 | Stop reason: HOSPADM

## 2018-11-19 RX ORDER — DOCUSATE SODIUM 100 MG/1
100 CAPSULE, LIQUID FILLED ORAL 2 TIMES DAILY PRN
Status: DISCONTINUED | OUTPATIENT
Start: 2018-11-19 | End: 2018-11-20 | Stop reason: HOSPADM

## 2018-11-19 RX ORDER — ONDANSETRON 2 MG/ML
4 INJECTION INTRAMUSCULAR; INTRAVENOUS EVERY 6 HOURS PRN
Status: DISCONTINUED | OUTPATIENT
Start: 2018-11-19 | End: 2018-11-20 | Stop reason: HOSPADM

## 2018-11-19 RX ORDER — ASPIRIN 81 MG/1
81 TABLET, CHEWABLE ORAL DAILY
Status: DISCONTINUED | OUTPATIENT
Start: 2018-11-20 | End: 2018-11-19

## 2018-11-19 RX ADMIN — SODIUM CHLORIDE, PRESERVATIVE FREE 10 ML: 5 INJECTION INTRAVENOUS at 21:19

## 2018-11-19 RX ADMIN — SODIUM CHLORIDE: 9 INJECTION, SOLUTION INTRAVENOUS at 17:27

## 2018-11-19 RX ADMIN — FAMOTIDINE 20 MG: 10 INJECTION INTRAVENOUS at 21:53

## 2018-11-19 RX ADMIN — INSULIN LISPRO 1 UNITS: 100 INJECTION, SOLUTION INTRAVENOUS; SUBCUTANEOUS at 21:28

## 2018-11-19 RX ADMIN — CARVEDILOL 25 MG: 25 TABLET, FILM COATED ORAL at 17:27

## 2018-11-19 RX ADMIN — HYDROCODONE BITARTRATE AND ACETAMINOPHEN 1 TABLET: 5; 325 TABLET ORAL at 22:37

## 2018-11-19 RX ADMIN — GABAPENTIN 600 MG: 300 CAPSULE ORAL at 21:19

## 2018-11-19 RX ADMIN — DOXAZOSIN 2 MG: 1 TABLET ORAL at 21:18

## 2018-11-19 RX ADMIN — ATORVASTATIN CALCIUM 20 MG: 20 TABLET, FILM COATED ORAL at 21:18

## 2018-11-19 RX ADMIN — INSULIN GLARGINE 44 UNITS: 100 INJECTION, SOLUTION SUBCUTANEOUS at 21:29

## 2018-11-19 ASSESSMENT — PAIN SCALES - GENERAL: PAINLEVEL_OUTOF10: 6

## 2018-11-19 ASSESSMENT — PAIN DESCRIPTION - FREQUENCY: FREQUENCY: CONTINUOUS

## 2018-11-19 ASSESSMENT — PAIN DESCRIPTION - DIRECTION: RADIATING_TOWARDS: FEET

## 2018-11-19 ASSESSMENT — PAIN DESCRIPTION - DESCRIPTORS: DESCRIPTORS: ACHING;STABBING

## 2018-11-19 ASSESSMENT — PAIN DESCRIPTION - LOCATION: LOCATION: LEG

## 2018-11-19 ASSESSMENT — PAIN DESCRIPTION - ONSET: ONSET: ON-GOING

## 2018-11-19 ASSESSMENT — PAIN DESCRIPTION - ORIENTATION: ORIENTATION: RIGHT;LEFT

## 2018-11-19 ASSESSMENT — PAIN DESCRIPTION - PAIN TYPE: TYPE: CHRONIC PAIN

## 2018-11-19 NOTE — H&P
grover Hodgson is a 76 y.o. male who presents as direct admission at request of Dr Todd Suero in preparation of [address] [facility] tomorrow AM. The patient has hx of significant heart disease and co morbidities  Was evaluated at this facility ER on 11/13 for chest pain. Cardiologist Dr Todd Suero consulted at that time and felt since troponin's were negative could discharge home d/t scheduled cardiac catheter pending. The patient describes some continued chest discomfort. States legs are swollen d/t sitting in chair for prolonged period today but not acutely worse than baseline. He denies SOB at rest.      Ten point ROS: reviewed negative, unless as noted in above HPI. Objective:   No intake or output data in the 24 hours ending 11/19/18 1624     Vitals:   Vitals:    11/19/18 1605   BP: (!) 173/70   Pulse: 64   Resp: 22   Temp: 97.3 °F (36.3 °C)   TempSrc: Oral   SpO2: 97%   Weight: 263 lb 3 oz (119.4 kg)   Height: 6' (1.829 m)       Physical Exam: 11/19/18     GEN -Awake nontoxic appearing male, sitting upright in bed , NAD. obese body habitus. Appears given age. EYES -Pupils are equally round. No scleral erythema, discharge, or conjunctivitis. HENT -MM are moist. Oral pharynx without exudates, no evidence of thrush. NECK -Supple, no apparent thyromegaly or masses. RESP -CTA, no wheezes, rales or rhonchi. Symmetric chest movement while on RA. O2 Sats at 97 %  C/V -S1/S2 auscultated. RRR without appreciable M/R/G. No JVD or carotid bruits. Peripheral pulses equal bilaterally and palpable. Cap refill <3 sec. +3 peripheral edema. Paced rhythm on telemetry   GI -Abdomen is soft non distended and without significant TTP. + BS. No masses or guarding. Rectal exam deferred.  -No CVA/ flank tenderness. Fuller catheter is not present. LYMPH-No palpable cervical lymphadenopathy and no hepatosplenomegaly. No petechiae or ecchymoses. MS -No gross joint deformities. SKIN -Normal coloration, warm, dry.  B/l LE consistent

## 2018-11-20 VITALS
TEMPERATURE: 96.8 F | BODY MASS INDEX: 36.16 KG/M2 | HEIGHT: 72 IN | OXYGEN SATURATION: 96 % | WEIGHT: 267 LBS | HEART RATE: 62 BPM | SYSTOLIC BLOOD PRESSURE: 137 MMHG | RESPIRATION RATE: 13 BRPM | DIASTOLIC BLOOD PRESSURE: 46 MMHG

## 2018-11-20 LAB
ALBUMIN SERPL-MCNC: 3.2 GM/DL (ref 3.4–5)
ALP BLD-CCNC: 77 IU/L (ref 40–128)
ALT SERPL-CCNC: 7 U/L (ref 10–40)
ANION GAP SERPL CALCULATED.3IONS-SCNC: 8 MMOL/L (ref 4–16)
AST SERPL-CCNC: 9 IU/L (ref 15–37)
BILIRUB SERPL-MCNC: 0.3 MG/DL (ref 0–1)
BUN BLDV-MCNC: 16 MG/DL (ref 6–23)
CALCIUM SERPL-MCNC: 8.5 MG/DL (ref 8.3–10.6)
CHLORIDE BLD-SCNC: 106 MMOL/L (ref 99–110)
CO2: 28 MMOL/L (ref 21–32)
CREAT SERPL-MCNC: 1.5 MG/DL (ref 0.9–1.3)
EKG ATRIAL RATE: 60 BPM
EKG DIAGNOSIS: NORMAL
EKG P AXIS: 3 DEGREES
EKG P-R INTERVAL: 286 MS
EKG Q-T INTERVAL: 436 MS
EKG QRS DURATION: 104 MS
EKG QTC CALCULATION (BAZETT): 436 MS
EKG R AXIS: 32 DEGREES
EKG T AXIS: 246 DEGREES
EKG VENTRICULAR RATE: 60 BPM
GFR AFRICAN AMERICAN: 56 ML/MIN/1.73M2
GFR NON-AFRICAN AMERICAN: 47 ML/MIN/1.73M2
GLUCOSE BLD-MCNC: 110 MG/DL (ref 70–99)
GLUCOSE BLD-MCNC: 73 MG/DL (ref 70–99)
GLUCOSE BLD-MCNC: 75 MG/DL (ref 70–99)
GLUCOSE BLD-MCNC: 95 MG/DL (ref 70–99)
HCT VFR BLD CALC: 33.8 % (ref 42–52)
HEMOGLOBIN: 10.9 GM/DL (ref 13.5–18)
MCH RBC QN AUTO: 30.6 PG (ref 27–31)
MCHC RBC AUTO-ENTMCNC: 32.2 % (ref 32–36)
MCV RBC AUTO: 94.9 FL (ref 78–100)
PDW BLD-RTO: 12.3 % (ref 11.7–14.9)
PLATELET # BLD: 176 K/CU MM (ref 140–440)
PMV BLD AUTO: 9.8 FL (ref 7.5–11.1)
POTASSIUM SERPL-SCNC: 4.1 MMOL/L (ref 3.5–5.1)
RBC # BLD: 3.56 M/CU MM (ref 4.6–6.2)
SODIUM BLD-SCNC: 142 MMOL/L (ref 135–145)
TOTAL PROTEIN: 5.7 GM/DL (ref 6.4–8.2)
TROPONIN T: 0.02 NG/ML
WBC # BLD: 7.3 K/CU MM (ref 4–10.5)

## 2018-11-20 PROCEDURE — 94150 VITAL CAPACITY TEST: CPT

## 2018-11-20 PROCEDURE — C1887 CATHETER, GUIDING: HCPCS

## 2018-11-20 PROCEDURE — 94761 N-INVAS EAR/PLS OXIMETRY MLT: CPT

## 2018-11-20 PROCEDURE — G0378 HOSPITAL OBSERVATION PER HR: HCPCS

## 2018-11-20 PROCEDURE — 96375 TX/PRO/DX INJ NEW DRUG ADDON: CPT

## 2018-11-20 PROCEDURE — 93005 ELECTROCARDIOGRAM TRACING: CPT | Performed by: NURSE PRACTITIONER

## 2018-11-20 PROCEDURE — 93458 L HRT ARTERY/VENTRICLE ANGIO: CPT | Performed by: INTERNAL MEDICINE

## 2018-11-20 PROCEDURE — 2580000003 HC RX 258

## 2018-11-20 PROCEDURE — B2111ZZ FLUOROSCOPY OF MULTIPLE CORONARY ARTERIES USING LOW OSMOLAR CONTRAST: ICD-10-PCS | Performed by: INTERNAL MEDICINE

## 2018-11-20 PROCEDURE — S0028 INJECTION, FAMOTIDINE, 20 MG: HCPCS | Performed by: NURSE PRACTITIONER

## 2018-11-20 PROCEDURE — 84484 ASSAY OF TROPONIN QUANT: CPT

## 2018-11-20 PROCEDURE — 80053 COMPREHEN METABOLIC PANEL: CPT

## 2018-11-20 PROCEDURE — 2580000003 HC RX 258: Performed by: NURSE PRACTITIONER

## 2018-11-20 PROCEDURE — 2500000003 HC RX 250 WO HCPCS: Performed by: NURSE PRACTITIONER

## 2018-11-20 PROCEDURE — 99232 SBSQ HOSP IP/OBS MODERATE 35: CPT | Performed by: INTERNAL MEDICINE

## 2018-11-20 PROCEDURE — 82962 GLUCOSE BLOOD TEST: CPT

## 2018-11-20 PROCEDURE — C1894 INTRO/SHEATH, NON-LASER: HCPCS

## 2018-11-20 PROCEDURE — 36415 COLL VENOUS BLD VENIPUNCTURE: CPT

## 2018-11-20 PROCEDURE — 6370000000 HC RX 637 (ALT 250 FOR IP): Performed by: NURSE PRACTITIONER

## 2018-11-20 PROCEDURE — 4A023N7 MEASUREMENT OF CARDIAC SAMPLING AND PRESSURE, LEFT HEART, PERCUTANEOUS APPROACH: ICD-10-PCS | Performed by: INTERNAL MEDICINE

## 2018-11-20 PROCEDURE — 85027 COMPLETE CBC AUTOMATED: CPT

## 2018-11-20 PROCEDURE — 6360000002 HC RX W HCPCS

## 2018-11-20 PROCEDURE — 2709999900 HC NON-CHARGEABLE SUPPLY

## 2018-11-20 PROCEDURE — 6360000004 HC RX CONTRAST MEDICATION

## 2018-11-20 PROCEDURE — 93454 CORONARY ARTERY ANGIO S&I: CPT | Performed by: INTERNAL MEDICINE

## 2018-11-20 PROCEDURE — C1769 GUIDE WIRE: HCPCS

## 2018-11-20 RX ADMIN — DOXAZOSIN 2 MG: 1 TABLET ORAL at 08:22

## 2018-11-20 RX ADMIN — CLOPIDOGREL BISULFATE 75 MG: 75 TABLET ORAL at 08:22

## 2018-11-20 RX ADMIN — FAMOTIDINE 20 MG: 10 INJECTION INTRAVENOUS at 08:21

## 2018-11-20 RX ADMIN — DEXTROSE MONOHYDRATE 12.5 G: 25 INJECTION, SOLUTION INTRAVENOUS at 06:44

## 2018-11-20 RX ADMIN — ASPIRIN 81 MG 81 MG: 81 TABLET ORAL at 08:22

## 2018-11-20 RX ADMIN — CARVEDILOL 25 MG: 25 TABLET, FILM COATED ORAL at 08:22

## 2018-11-20 RX ADMIN — GABAPENTIN 600 MG: 300 CAPSULE ORAL at 08:21

## 2018-11-20 RX ADMIN — SODIUM CHLORIDE: 9 INJECTION, SOLUTION INTRAVENOUS at 06:06

## 2018-11-20 RX ADMIN — HYDROCODONE BITARTRATE AND ACETAMINOPHEN 1 TABLET: 5; 325 TABLET ORAL at 08:22

## 2018-11-20 RX ADMIN — NIFEDIPINE 90 MG: 30 TABLET, FILM COATED, EXTENDED RELEASE ORAL at 08:22

## 2018-11-20 ASSESSMENT — PAIN SCALES - GENERAL
PAINLEVEL_OUTOF10: 8
PAINLEVEL_OUTOF10: 4

## 2018-11-20 ASSESSMENT — PAIN DESCRIPTION - ONSET: ONSET: ON-GOING

## 2018-11-20 ASSESSMENT — PAIN DESCRIPTION - DESCRIPTORS: DESCRIPTORS: ACHING;STABBING

## 2018-11-20 ASSESSMENT — PAIN DESCRIPTION - PAIN TYPE: TYPE: CHRONIC PAIN

## 2018-11-20 ASSESSMENT — PAIN DESCRIPTION - PROGRESSION
CLINICAL_PROGRESSION: NOT CHANGED
CLINICAL_PROGRESSION: NOT CHANGED

## 2018-11-20 ASSESSMENT — PAIN DESCRIPTION - FREQUENCY: FREQUENCY: CONTINUOUS

## 2018-11-20 ASSESSMENT — PAIN DESCRIPTION - LOCATION: LOCATION: FOOT

## 2018-11-20 ASSESSMENT — PAIN DESCRIPTION - ORIENTATION: ORIENTATION: RIGHT

## 2018-11-20 NOTE — PLAN OF CARE
Problem: Pain:  Goal: Pain level will decrease  Pain level will decrease   Outcome: Ongoing    Goal: Control of acute pain  Control of acute pain   Outcome: Ongoing    Goal: Control of chronic pain  Control of chronic pain   Outcome: Ongoing      Problem: Falls - Risk of:  Goal: Will remain free from falls  Will remain free from falls   Outcome: Ongoing    Goal: Absence of physical injury  Absence of physical injury   Outcome: Ongoing      Problem: Discharge Planning:  Goal: Discharged to appropriate level of care  Discharged to appropriate level of care   Outcome: Ongoing      Problem: Cardiac Output - Decreased:  Goal: Hemodynamic stability will improve  Hemodynamic stability will improve  Outcome: Ongoing      Problem: Serum Glucose Level - Abnormal:  Goal: Ability to maintain appropriate glucose levels will improve  Ability to maintain appropriate glucose levels will improve  Outcome: Ongoing      Problem: Pain:  Goal: Control of acute pain  Control of acute pain  Outcome: Ongoing    Goal: Control of chronic pain  Control of chronic pain  Outcome: Ongoing      Problem: Tissue Perfusion - Cardiopulmonary, Altered:  Goal: Absence of angina  Absence of angina  Outcome: Ongoing    Goal: Circulatory function within specified parameters  Circulatory function within specified parameters  Outcome: Ongoing    Goal: Hemodynamic stability will improve  Hemodynamic stability will improve  Outcome: Ongoing

## 2018-11-20 NOTE — PROGRESS NOTES
Patient returned to 3025. Tegaderm and arm board to right wrist - no bleedng nor hematoma noted. Bedside report given to Saniya Hess RN  & site checked with Pat Donald RN. Vitals stables.
81 mg Oral Daily    atorvastatin  20 mg Oral Nightly    carvedilol  25 mg Oral BID WC    clopidogrel  75 mg Oral Daily    doxazosin  2 mg Oral BID    gabapentin  600 mg Oral BID    insulin glargine  44 Units Subcutaneous Nightly    NIFEdipine  90 mg Oral Daily    sodium chloride flush  10 mL Intravenous 2 times per day    famotidine (PEPCID) injection  20 mg Intravenous BID    enoxaparin  40 mg Subcutaneous Daily    insulin lispro  0-12 Units Subcutaneous TID WC    insulin lispro  0-6 Units Subcutaneous Nightly      sodium chloride 75 mL/hr at 11/20/18 0606    dextrose       sodium chloride flush, magnesium hydroxide, docusate sodium, ondansetron, nitroGLYCERIN, glucose, dextrose, glucagon (rDNA), dextrose, HYDROcodone 5 mg - acetaminophen    Lab Data:  CBC:   Recent Labs      11/19/18   1856  11/20/18   0239   WBC  6.8  7.3   HGB  11.5*  10.9*   HCT  34.8*  33.8*   MCV  94.8  94.9   PLT  169  176     BMP:   Recent Labs      11/19/18   1700  11/20/18   0239   NA  141  142   K  4.8  4.1   CL  103  106   CO2  32  28   BUN  16  16   CREATININE  1.4*  1.5*     LIVER PROFILE:   Recent Labs      11/20/18   0239   AST  9*   ALT  7*   BILITOT  0.3   ALKPHOS  77     PT/INR:   Recent Labs      11/19/18   1700   PROTIME  11.7   INR  1.03     APTT: No results for input(s): APTT in the last 72 hours. BNP:  No results for input(s): BNP in the last 72 hours. TROPONIN: @TROPONINI:3@      Assessment:  76 y. o.year old who is admitted for          Plan:  1. wosening cardiomyopathy with [address] and benefit explained and consent obtained, will admit the patient to hospital a night before for IV hydration. 2. CAD: as above, he will need [facility], continue all medications  3. Leg swelling: worsening renal failure and cardiomyopathy induced, will get venous doppler to check for venous insufficiency  4. HYPOTENSION: D/C CLONIDINE  5. CKD: stable  6.  PAD: healed rt foot post sx, will recheck lower extremity

## 2018-11-20 NOTE — DISCHARGE SUMMARY
Ileana Taylor 1950 [id number]  PCP:  Scarlett Fung date: 11/19/2018  Admitting Physician: Rodrigo Joseph MD    Discharge date: 11/20/2018 Discharge Physician: Rodrigo Joseph MD         Discharge Diagnoses. As per below    Hospital Course:  History of present illness at admission: As per H&P  Subsequent Hospital Course:     Atypical chest pain: Seem to be noncardiac and cardiac cath Was normal.  CKD stage III: Hydrated and cardiology okay for the discharge today itself. Patient is advised to get labs from his PCP in 5 days. On the day of discharge, pt felt better. No new complaints. Pertinent Exam Findings on Day of Discharge:  General Appearance:    Alert, cooperative, no distress, appears stated age  Head:    Normocephalic, without obvious abnormality, atraumatic  Eyes:    PERRL, conjunctiva/corneas clear, EOM's intact  Lungs:    Clear to auscultation bilaterally, respirations unlabored   Heart:    Regular rate and rhythm, S1 and S2 normal, no murmur,   rub or gallop  Abdomen:     Soft, non-tender, bowel sounds active, no masses, no organomegaly  Extremities:   Extremities normal, atraumatic, no cyanosis or edema    Consults:  IP CONSULT TO CARDIOLOGY    Patient Instructions:   [address], [address] [facility] Medication Instructions AIF:[id number]    Printed on:11/20/18 1334   Medication Information                      aspirin 81 MG chewable tablet  Take 1 tablet by mouth daily             atorvastatin (LIPITOR) 20 MG tablet  Take 1 tablet by mouth nightly             carvedilol (COREG) 25 MG tablet  Take 25 mg by mouth 2 times daily (with meals)             clopidogrel (PLAVIX) 75 MG tablet  Take 1 tablet by mouth daily             doxazosin (CARDURA) 2 MG tablet  Take 1 tablet by mouth 2 times daily             gabapentin (NEURONTIN) 600 MG tablet  Take 600 mg by mouth 2 times daily.  .             insulin aspart (NOVOLOG) 100 UNIT/ML injection vial  Inject into the skin 3 times daily

## 2018-12-10 ENCOUNTER — OFFICE VISIT (OUTPATIENT)
Dept: CARDIOLOGY CLINIC | Age: 68
End: 2018-12-10
Payer: MEDICARE

## 2018-12-10 VITALS
HEART RATE: 60 BPM | HEIGHT: 72 IN | BODY MASS INDEX: 35.62 KG/M2 | WEIGHT: 263 LBS | SYSTOLIC BLOOD PRESSURE: 134 MMHG | DIASTOLIC BLOOD PRESSURE: 70 MMHG

## 2018-12-10 DIAGNOSIS — I25.10 CORONARY ARTERY DISEASE INVOLVING NATIVE CORONARY ARTERY OF NATIVE HEART WITHOUT ANGINA PECTORIS: Primary | ICD-10-CM

## 2018-12-10 DIAGNOSIS — E78.5 DYSLIPIDEMIA: ICD-10-CM

## 2018-12-10 DIAGNOSIS — I10 ESSENTIAL HYPERTENSION: ICD-10-CM

## 2018-12-10 PROCEDURE — 1111F DSCHRG MED/CURRENT MED MERGE: CPT | Performed by: INTERNAL MEDICINE

## 2018-12-10 PROCEDURE — 1036F TOBACCO NON-USER: CPT | Performed by: INTERNAL MEDICINE

## 2018-12-10 PROCEDURE — 4040F PNEUMOC VAC/ADMIN/RCVD: CPT | Performed by: INTERNAL MEDICINE

## 2018-12-10 PROCEDURE — G8598 ASA/ANTIPLAT THER USED: HCPCS | Performed by: INTERNAL MEDICINE

## 2018-12-10 PROCEDURE — G8484 FLU IMMUNIZE NO ADMIN: HCPCS | Performed by: INTERNAL MEDICINE

## 2018-12-10 PROCEDURE — 3017F COLORECTAL CA SCREEN DOC REV: CPT | Performed by: INTERNAL MEDICINE

## 2018-12-10 PROCEDURE — 1101F PT FALLS ASSESS-DOCD LE1/YR: CPT | Performed by: INTERNAL MEDICINE

## 2018-12-10 PROCEDURE — 99214 OFFICE O/P EST MOD 30 MIN: CPT | Performed by: INTERNAL MEDICINE

## 2018-12-10 PROCEDURE — 1123F ACP DISCUSS/DSCN MKR DOCD: CPT | Performed by: INTERNAL MEDICINE

## 2018-12-10 PROCEDURE — G8417 CALC BMI ABV UP PARAM F/U: HCPCS | Performed by: INTERNAL MEDICINE

## 2018-12-10 PROCEDURE — G8427 DOCREV CUR MEDS BY ELIG CLIN: HCPCS | Performed by: INTERNAL MEDICINE

## 2018-12-10 NOTE — PROGRESS NOTES
daily 30 tablet 11    gabapentin (NEURONTIN) 600 MG tablet Take 600 mg by mouth 2 times daily. [place]any[place]rake [place] insulin glargine (LANTUS SOLOSTAR) 100 UNIT/ML injection pen Inject 40 Units into the skin nightly (Patient taking differently: Inject 44 Units into the skin nightly ) 5 Pen 3     No current facility-administered medications for this visit. Allergies: Pcn [penicillins] and Fentanyl  Past Medical History:   Diagnosis Date    Acid reflux     Acute MI ([facility] 2004, 2008    Arthritis     Back    Broken teeth     Upper Front    CAD (coronary artery disease)     Sees Dr. Erin Grant [facility])     per old chart    CHF (congestive heart failure) ([facility]     Chronic back pain     Chronic kidney disease     STAGE 3 KIDNEY FAILURE- \"from my diabetes- do not follow with any one- have seen Dr Terese Ram in the past\"    Diabetes mellitus ([facility] Dx 1965    per old chart pt has been diabetic since age 13    Diabetic neuropathy ([facility]     \"in my feet\"    H/O cardiovascular stress test 08/25/2016    H/O Doppler ultrasound 09/27/2018    Moderate disease of the right lower extremity with an JALEN of 0.72.   Moderate to severe disease of the left lower extremity with an JALEN of 0.55.   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)      Past Surgical History:   Procedure Laterality Date    CARDIAC CATHETERIZATION      per old chart done 10/2014    CARDIAC CATHETERIZATION  07/14/2017    with angiography of leg    CARDIAC CATHETERIZATION  11/20/2018    PATENT STENTS OF ALL THREE MAJOR VESSELS    CARDIAC DEFIBRILLATOR PLACEMENT  06/04/2010    [employer] [address] DR Defibrillator Implanted    COLECTOMY Right 08/26/2016    laparascopic; robotic assisted    COLONOSCOPY  08/04/2016    CORONARY ANGIOPLASTY      \"15 Heart Stents\"    CORONARY ANGIOPLASTY WITH STENT PLACEMENT      per old chart had angio with stent to circumflex and obtuse marginal artery at [facility] 5/2010( old chart also gives hx of stent placement done 2000,2004 and 2005)   [address]      Teeth Extracted In Past    PACEMAKER PLACEMENT  06/04/2010    [employer]onic Sec[address] DR Defibrillator Implanted    TOE AMPUTATION Right 09/12/2017    Rt 3rd toe    TOE AMPUTATION Right 01/09/2018     Right 5th toe amputation and Toenails trimmed left 2,3,4 and 5th toes    VASCULAR SURGERY      per old chart had balloon angioplasty right superfical femoral artery,right popliteal artery,,right ant.tibial artery, right tibioperoneal trunk, and right post.tibial artery wna stent placement right popliteal artery and superfical femoral artery 7/2012     Family History   Problem Relation Age of Onset    Diabetes Mother     Stroke Mother     High Blood Pressure Mother     Vision Loss Mother     Cancer Father         Prostate Cancer    Diabetes Sister     Neuropathy Sister     Other Sister         \"Breathing Problems\"    Heart Disease Sister     Early Death Sister 62        Heart Complications    Cancer Brother         \"Stomach Cancer\"    High Blood Pressure Brother     Diabetes Brother     Heart Disease Brother     High Blood Pressure Brother     Cancer Son         \"Testicle Cancer\"     Social History   Substance Use Topics  Smoking status: Former Smoker     Packs/day: 0.00     Years: 36.00     Start date: 1980    Smokeless tobacco: Never Used    Alcohol use No      [unfilled]  Review of Systems:   · Constitutional: No Fever or Weight Loss   · Eyes: No Decreased Vision  · ENT: No Headaches, Hearing Loss or Vertigo  · Cardiovascular: No chest pain, dyspnea on exertion, palpitations or loss of consciousness  · Respiratory: No cough or wheezing    · Gastrointestinal: No abdominal pain, appetite loss, blood in stools, constipation, diarrhea or heartburn  · Genitourinary: No dysuria, trouble voiding, or hematuria  · Musculoskeletal:  No gait disturbance, weakness or joint complaints  · Integumentary: No rash or pruritis  · Neurological: No TIA or stroke symptoms  · Psychiatric: No anxiety or depression  · Endocrine: No malaise, fatigue or temperature intolerance  · Hematologic/Lymphatic: No bleeding problems, blood clots or swollen lymph nodes  · Allergic/Immunologic: No nasal congestion or hives  All systems negative except as marked. Objective:  /70   Pulse 60   Ht 6' (1.829 m)   Wt 263 lb (119.3 kg)   BMI 35.67 kg/m²   Wt Readings from Last 3 Encounters:   12/10/18 263 lb (119.3 kg)   11/20/18 267 lb (121.1 kg)   11/13/18 260 lb (117.9 kg)     Body mass index is 35.67 kg/m². GENERAL - Alert, oriented, pleasant, in no apparent distress,normal grooming  HEENT - pupils are reactive to light and accomodation, cornea intact, conjunctive normal color, ears are normal in exam,throat exam in normal, teeth, gum and palate are normal, oral mucosa is normal without any notation of pallor or cyanosis  Neck - Supple. No jugular venous distention noted. No carotid bruits, no apical -carotid delay  Respiratory:  Normal breath sounds, No respiratory distress, No wheezing, No chest tenderness. ,no use of accessory muscles, diaphragm movement is normal  Cardiovascular: (PMI) apex non displaced,no lifts no thrills, no s3,no s4, Normal heart

## 2018-12-23 PROCEDURE — 93297 REM INTERROG DEV EVAL ICPMS: CPT | Performed by: INTERNAL MEDICINE

## 2018-12-23 PROCEDURE — 93295 DEV INTERROG REMOTE 1/2/MLT: CPT | Performed by: INTERNAL MEDICINE

## 2018-12-23 PROCEDURE — 93296 REM INTERROG EVL PM/IDS: CPT | Performed by: INTERNAL MEDICINE

## 2018-12-27 ENCOUNTER — PROCEDURE VISIT (OUTPATIENT)
Dept: CARDIOLOGY CLINIC | Age: 68
End: 2018-12-27
Payer: MEDICARE

## 2018-12-27 ENCOUNTER — TELEPHONE (OUTPATIENT)
Dept: CARDIOLOGY CLINIC | Age: 68
End: 2018-12-27

## 2018-12-27 DIAGNOSIS — I49.5 SINUS NODE DYSFUNCTION (HCC): ICD-10-CM

## 2018-12-27 DIAGNOSIS — Z95.810 ICD (IMPLANTABLE CARDIOVERTER-DEFIBRILLATOR), DUAL, IN SITU: Primary | ICD-10-CM

## 2019-01-03 ENCOUNTER — HOSPITAL ENCOUNTER (OUTPATIENT)
Age: 69
Discharge: HOME | End: 2019-01-03
Payer: MEDICARE

## 2019-01-03 LAB
ANION GAP SERPL CALCULATED.3IONS-SCNC: 9 MMOL/L (ref 4–16)
BACTERIA: NEGATIVE /HPF
BILIRUBIN URINE: NEGATIVE MG/DL
BLOOD, URINE: NEGATIVE
BUN BLDV-MCNC: 12 MG/DL (ref 6–23)
CALCIUM SERPL-MCNC: 9 MG/DL (ref 8.3–10.6)
CHLORIDE BLD-SCNC: 103 MMOL/L (ref 99–110)
CLARITY: CLEAR
CO2: 28 MMOL/L (ref 21–32)
COLOR: YELLOW
CREAT SERPL-MCNC: 1.3 MG/DL (ref 0.9–1.3)
CREATININE URINE: 109.1 MG/DL (ref 39–259)
GFR AFRICAN AMERICAN: >60 ML/MIN/1.73M2
GFR NON-AFRICAN AMERICAN: 55 ML/MIN/1.73M2
GLUCOSE BLD-MCNC: 264 MG/DL (ref 70–99)
GLUCOSE, URINE: 150 MG/DL
KETONES, URINE: NEGATIVE MG/DL
LEUKOCYTE ESTERASE, URINE: ABNORMAL
MAGNESIUM: 1.8 MG/DL (ref 1.8–2.4)
MUCUS: ABNORMAL HPF
NITRITE URINE, QUANTITATIVE: NEGATIVE
PH, URINE: 5 (ref 5–8)
PHOSPHORUS: 2.7 MG/DL (ref 2.5–4.9)
POTASSIUM SERPL-SCNC: 4.5 MMOL/L (ref 3.5–5.1)
PROT/CREAT RATIO, UR: 0.6
PROTEIN UA: 100 MG/DL
RBC URINE: 3 /HPF (ref 0–3)
SODIUM BLD-SCNC: 140 MMOL/L (ref 135–145)
SPECIFIC GRAVITY UA: 1.01 (ref 1–1.03)
SQUAMOUS EPITHELIAL: 2 /HPF
TRICHOMONAS: ABNORMAL /HPF
URINE TOTAL PROTEIN: 66.3 MG/DL
UROBILINOGEN, URINE: NORMAL MG/DL (ref 0.2–1)
WBC UA: 5 /HPF (ref 0–2)

## 2019-01-03 PROCEDURE — 84156 ASSAY OF PROTEIN URINE: CPT

## 2019-01-03 PROCEDURE — 84100 ASSAY OF PHOSPHORUS: CPT

## 2019-01-03 PROCEDURE — 80048 BASIC METABOLIC PNL TOTAL CA: CPT

## 2019-01-03 PROCEDURE — 82570 ASSAY OF URINE CREATININE: CPT

## 2019-01-03 PROCEDURE — 83735 ASSAY OF MAGNESIUM: CPT

## 2019-01-03 PROCEDURE — 81001 URINALYSIS AUTO W/SCOPE: CPT

## 2019-01-03 PROCEDURE — 36415 COLL VENOUS BLD VENIPUNCTURE: CPT

## 2019-01-29 PROBLEM — I42.9 CARDIOMYOPATHY (HCC): Status: ACTIVE | Noted: 2019-01-29

## 2019-01-29 PROBLEM — I25.10 CAD (CORONARY ARTERY DISEASE): Status: ACTIVE | Noted: 2019-01-29

## 2019-01-29 PROBLEM — N18.31 CHRONIC KIDNEY DISEASE (CKD) STAGE G3A/A3, MODERATELY DECREASED GLOMERULAR FILTRATION RATE (GFR) BETWEEN 45-59 ML/MIN/1.73 SQUARE METER AND ALBUMINURIA CREATININE RATIO GREATER THAN 300 MG/G (HCC): Status: ACTIVE | Noted: 2019-01-29

## 2019-03-11 ENCOUNTER — HOSPITAL ENCOUNTER (INPATIENT)
Age: 69
LOS: 1 days | Discharge: HOME | DRG: 291 | End: 2019-03-12
Attending: EMERGENCY MEDICINE | Admitting: INTERNAL MEDICINE
Payer: MEDICARE

## 2019-03-11 ENCOUNTER — APPOINTMENT (OUTPATIENT)
Dept: GENERAL RADIOLOGY | Age: 69
DRG: 291 | End: 2019-03-11
Payer: MEDICARE

## 2019-03-11 DIAGNOSIS — N18.9 CHRONIC KIDNEY DISEASE, UNSPECIFIED CKD STAGE: ICD-10-CM

## 2019-03-11 DIAGNOSIS — R06.00 DYSPNEA, UNSPECIFIED TYPE: ICD-10-CM

## 2019-03-11 DIAGNOSIS — R07.9 CHEST PAIN, UNSPECIFIED TYPE: Primary | ICD-10-CM

## 2019-03-11 PROBLEM — I16.9 HYPERTENSIVE CRISIS: Status: ACTIVE | Noted: 2019-03-11

## 2019-03-11 LAB
ALBUMIN SERPL-MCNC: 3.5 GM/DL (ref 3.4–5)
ALP BLD-CCNC: 89 IU/L (ref 40–129)
ALT SERPL-CCNC: 6 U/L (ref 10–40)
ANION GAP SERPL CALCULATED.3IONS-SCNC: 7 MMOL/L (ref 4–16)
AST SERPL-CCNC: 11 IU/L (ref 15–37)
BASOPHILS ABSOLUTE: 0 K/CU MM
BASOPHILS RELATIVE PERCENT: 0.3 % (ref 0–1)
BILIRUB SERPL-MCNC: 0.5 MG/DL (ref 0–1)
BUN BLDV-MCNC: 14 MG/DL (ref 6–23)
CALCIUM SERPL-MCNC: 8.1 MG/DL (ref 8.3–10.6)
CHLORIDE BLD-SCNC: 102 MMOL/L (ref 99–110)
CO2: 28 MMOL/L (ref 21–32)
CREAT SERPL-MCNC: 1.5 MG/DL (ref 0.9–1.3)
DIFFERENTIAL TYPE: ABNORMAL
EOSINOPHILS ABSOLUTE: 0.2 K/CU MM
EOSINOPHILS RELATIVE PERCENT: 2.8 % (ref 0–3)
GFR AFRICAN AMERICAN: 56 ML/MIN/1.73M2
GFR NON-AFRICAN AMERICAN: 47 ML/MIN/1.73M2
GLUCOSE BLD-MCNC: 138 MG/DL (ref 70–99)
GLUCOSE BLD-MCNC: 202 MG/DL (ref 70–99)
HCT VFR BLD CALC: 39.8 % (ref 42–52)
HEMOGLOBIN: 12.4 GM/DL (ref 13.5–18)
IMMATURE NEUTROPHIL %: 0.5 % (ref 0–0.43)
LYMPHOCYTES ABSOLUTE: 1.2 K/CU MM
LYMPHOCYTES RELATIVE PERCENT: 13.5 % (ref 24–44)
MCH RBC QN AUTO: 30.2 PG (ref 27–31)
MCHC RBC AUTO-ENTMCNC: 31.2 % (ref 32–36)
MCV RBC AUTO: 96.8 FL (ref 78–100)
MONOCYTES ABSOLUTE: 0.4 K/CU MM
MONOCYTES RELATIVE PERCENT: 4.2 % (ref 0–4)
NUCLEATED RBC %: 0 %
PDW BLD-RTO: 12.7 % (ref 11.7–14.9)
PLATELET # BLD: 205 K/CU MM (ref 140–440)
PMV BLD AUTO: 9.8 FL (ref 7.5–11.1)
POTASSIUM SERPL-SCNC: 4.2 MMOL/L (ref 3.5–5.1)
PRO-BNP: 290.3 PG/ML
RBC # BLD: 4.11 M/CU MM (ref 4.6–6.2)
SEGMENTED NEUTROPHILS ABSOLUTE COUNT: 6.8 K/CU MM
SEGMENTED NEUTROPHILS RELATIVE PERCENT: 78.7 % (ref 36–66)
SODIUM BLD-SCNC: 137 MMOL/L (ref 135–145)
TOTAL IMMATURE NEUTOROPHIL: 0.04 K/CU MM
TOTAL NUCLEATED RBC: 0 K/CU MM
TOTAL PROTEIN: 7 GM/DL (ref 6.4–8.2)
TROPONIN T: 0.01 NG/ML
TROPONIN T: 0.02 NG/ML
WBC # BLD: 8.6 K/CU MM (ref 4–10.5)

## 2019-03-11 PROCEDURE — 2060000000 HC ICU INTERMEDIATE R&B

## 2019-03-11 PROCEDURE — 96374 THER/PROPH/DIAG INJ IV PUSH: CPT

## 2019-03-11 PROCEDURE — G0378 HOSPITAL OBSERVATION PER HR: HCPCS

## 2019-03-11 PROCEDURE — 83880 ASSAY OF NATRIURETIC PEPTIDE: CPT

## 2019-03-11 PROCEDURE — 96375 TX/PRO/DX INJ NEW DRUG ADDON: CPT

## 2019-03-11 PROCEDURE — 2700000000 HC OXYGEN THERAPY PER DAY

## 2019-03-11 PROCEDURE — 36415 COLL VENOUS BLD VENIPUNCTURE: CPT

## 2019-03-11 PROCEDURE — 85025 COMPLETE CBC W/AUTO DIFF WBC: CPT

## 2019-03-11 PROCEDURE — 84484 ASSAY OF TROPONIN QUANT: CPT

## 2019-03-11 PROCEDURE — 94761 N-INVAS EAR/PLS OXIMETRY MLT: CPT

## 2019-03-11 PROCEDURE — 80053 COMPREHEN METABOLIC PANEL: CPT

## 2019-03-11 PROCEDURE — 82962 GLUCOSE BLOOD TEST: CPT

## 2019-03-11 PROCEDURE — 6370000000 HC RX 637 (ALT 250 FOR IP): Performed by: INTERNAL MEDICINE

## 2019-03-11 PROCEDURE — 99285 EMERGENCY DEPT VISIT HI MDM: CPT

## 2019-03-11 PROCEDURE — 6360000002 HC RX W HCPCS: Performed by: PHYSICIAN ASSISTANT

## 2019-03-11 PROCEDURE — 2500000003 HC RX 250 WO HCPCS: Performed by: INTERNAL MEDICINE

## 2019-03-11 PROCEDURE — 93005 ELECTROCARDIOGRAM TRACING: CPT | Performed by: PHYSICIAN ASSISTANT

## 2019-03-11 PROCEDURE — 71045 X-RAY EXAM CHEST 1 VIEW: CPT

## 2019-03-11 RX ORDER — NIFEDIPINE 30 MG/1
90 TABLET, EXTENDED RELEASE ORAL DAILY
Status: DISCONTINUED | OUTPATIENT
Start: 2019-03-12 | End: 2019-03-12 | Stop reason: HOSPADM

## 2019-03-11 RX ORDER — FUROSEMIDE 40 MG/1
40 TABLET ORAL DAILY
Status: ON HOLD | COMMUNITY
End: 2019-03-12 | Stop reason: HOSPADM

## 2019-03-11 RX ORDER — GABAPENTIN 300 MG/1
600 CAPSULE ORAL 2 TIMES DAILY
Status: DISCONTINUED | OUTPATIENT
Start: 2019-03-11 | End: 2019-03-12 | Stop reason: HOSPADM

## 2019-03-11 RX ORDER — LOSARTAN POTASSIUM 50 MG/1
100 TABLET ORAL EVERY EVENING
Status: DISCONTINUED | OUTPATIENT
Start: 2019-03-11 | End: 2019-03-12 | Stop reason: HOSPADM

## 2019-03-11 RX ORDER — INSULIN GLARGINE 100 [IU]/ML
44 INJECTION, SOLUTION SUBCUTANEOUS NIGHTLY
Status: DISCONTINUED | OUTPATIENT
Start: 2019-03-11 | End: 2019-03-12 | Stop reason: HOSPADM

## 2019-03-11 RX ORDER — FUROSEMIDE 10 MG/ML
40 INJECTION INTRAMUSCULAR; INTRAVENOUS ONCE
Status: COMPLETED | OUTPATIENT
Start: 2019-03-11 | End: 2019-03-11

## 2019-03-11 RX ORDER — CARVEDILOL 25 MG/1
25 TABLET ORAL 2 TIMES DAILY WITH MEALS
Status: DISCONTINUED | OUTPATIENT
Start: 2019-03-12 | End: 2019-03-12 | Stop reason: HOSPADM

## 2019-03-11 RX ORDER — DOXAZOSIN MESYLATE 4 MG/1
2 TABLET ORAL 2 TIMES DAILY
Status: DISCONTINUED | OUTPATIENT
Start: 2019-03-11 | End: 2019-03-12

## 2019-03-11 RX ORDER — NITROGLYCERIN 20 MG/100ML
5 INJECTION INTRAVENOUS CONTINUOUS
Status: DISCONTINUED | OUTPATIENT
Start: 2019-03-11 | End: 2019-03-12

## 2019-03-11 RX ORDER — FUROSEMIDE 10 MG/ML
40 INJECTION INTRAMUSCULAR; INTRAVENOUS 2 TIMES DAILY
Status: DISCONTINUED | OUTPATIENT
Start: 2019-03-12 | End: 2019-03-12 | Stop reason: HOSPADM

## 2019-03-11 RX ORDER — ASPIRIN 81 MG/1
81 TABLET, CHEWABLE ORAL DAILY
Status: DISCONTINUED | OUTPATIENT
Start: 2019-03-12 | End: 2019-03-12 | Stop reason: HOSPADM

## 2019-03-11 RX ORDER — ONDANSETRON 2 MG/ML
4 INJECTION INTRAMUSCULAR; INTRAVENOUS EVERY 6 HOURS PRN
Status: DISCONTINUED | OUTPATIENT
Start: 2019-03-11 | End: 2019-03-12 | Stop reason: HOSPADM

## 2019-03-11 RX ORDER — CLOPIDOGREL BISULFATE 75 MG/1
75 TABLET ORAL DAILY
Status: DISCONTINUED | OUTPATIENT
Start: 2019-03-12 | End: 2019-03-12 | Stop reason: HOSPADM

## 2019-03-11 RX ORDER — ATORVASTATIN CALCIUM 20 MG/1
20 TABLET, FILM COATED ORAL NIGHTLY
Status: DISCONTINUED | OUTPATIENT
Start: 2019-03-11 | End: 2019-03-12 | Stop reason: HOSPADM

## 2019-03-11 RX ADMIN — GABAPENTIN 600 MG: 300 CAPSULE ORAL at 22:43

## 2019-03-11 RX ADMIN — FUROSEMIDE 40 MG: 10 INJECTION, SOLUTION INTRAVENOUS at 17:12

## 2019-03-11 RX ADMIN — ATORVASTATIN CALCIUM 20 MG: 20 TABLET, FILM COATED ORAL at 22:45

## 2019-03-11 RX ADMIN — NITROGLYCERIN 5 MCG/MIN: 20 INJECTION INTRAVENOUS at 17:13

## 2019-03-11 RX ADMIN — LOSARTAN POTASSIUM 100 MG: 50 TABLET, FILM COATED ORAL at 22:44

## 2019-03-11 RX ADMIN — DOXAZOSIN 2 MG: 4 TABLET ORAL at 22:46

## 2019-03-11 ASSESSMENT — PAIN SCALES - GENERAL: PAINLEVEL_OUTOF10: 8

## 2019-03-11 ASSESSMENT — PAIN DESCRIPTION - PROGRESSION: CLINICAL_PROGRESSION: NOT CHANGED

## 2019-03-11 ASSESSMENT — PAIN DESCRIPTION - PAIN TYPE
TYPE: ACUTE PAIN;CHRONIC PAIN
TYPE: ACUTE PAIN;CHRONIC PAIN

## 2019-03-11 ASSESSMENT — PAIN DESCRIPTION - FREQUENCY: FREQUENCY: CONTINUOUS

## 2019-03-11 ASSESSMENT — PAIN DESCRIPTION - LOCATION: LOCATION: CHEST

## 2019-03-12 VITALS
HEIGHT: 72 IN | OXYGEN SATURATION: 96 % | WEIGHT: 263 LBS | HEART RATE: 63 BPM | BODY MASS INDEX: 35.62 KG/M2 | SYSTOLIC BLOOD PRESSURE: 149 MMHG | DIASTOLIC BLOOD PRESSURE: 62 MMHG | TEMPERATURE: 98.8 F | RESPIRATION RATE: 20 BRPM

## 2019-03-12 LAB
ANION GAP SERPL CALCULATED.3IONS-SCNC: 6 MMOL/L (ref 4–16)
BACTERIA: NEGATIVE /HPF
BASOPHILS ABSOLUTE: 0 K/CU MM
BASOPHILS RELATIVE PERCENT: 0.4 % (ref 0–1)
BILIRUBIN URINE: NEGATIVE MG/DL
BLOOD, URINE: NEGATIVE
BUN BLDV-MCNC: 13 MG/DL (ref 6–23)
CALCIUM SERPL-MCNC: 8.1 MG/DL (ref 8.3–10.6)
CHLORIDE BLD-SCNC: 106 MMOL/L (ref 99–110)
CLARITY: CLEAR
CO2: 28 MMOL/L (ref 21–32)
COLOR: COLORLESS
CREAT SERPL-MCNC: 1.4 MG/DL (ref 0.9–1.3)
CREATININE URINE: 13.8 MG/DL (ref 39–259)
DIFFERENTIAL TYPE: ABNORMAL
EKG ATRIAL RATE: 73 BPM
EKG ATRIAL RATE: 83 BPM
EKG DIAGNOSIS: NORMAL
EKG DIAGNOSIS: NORMAL
EKG P AXIS: 61 DEGREES
EKG P-R INTERVAL: 180 MS
EKG P-R INTERVAL: 218 MS
EKG Q-T INTERVAL: 384 MS
EKG Q-T INTERVAL: 410 MS
EKG QRS DURATION: 90 MS
EKG QRS DURATION: 92 MS
EKG QTC CALCULATION (BAZETT): 451 MS
EKG QTC CALCULATION (BAZETT): 451 MS
EKG R AXIS: 11 DEGREES
EKG R AXIS: 8 DEGREES
EKG T AXIS: 118 DEGREES
EKG T AXIS: 130 DEGREES
EKG VENTRICULAR RATE: 73 BPM
EKG VENTRICULAR RATE: 83 BPM
EOSINOPHILS ABSOLUTE: 0.2 K/CU MM
EOSINOPHILS RELATIVE PERCENT: 2 % (ref 0–3)
GFR AFRICAN AMERICAN: >60 ML/MIN/1.73M2
GFR NON-AFRICAN AMERICAN: 50 ML/MIN/1.73M2
GLUCOSE BLD-MCNC: 118 MG/DL (ref 70–99)
GLUCOSE BLD-MCNC: 88 MG/DL (ref 70–99)
GLUCOSE BLD-MCNC: 88 MG/DL (ref 70–99)
GLUCOSE, URINE: NEGATIVE MG/DL
HCT VFR BLD CALC: 36.5 % (ref 42–52)
HEMOGLOBIN: 11.4 GM/DL (ref 13.5–18)
IMMATURE NEUTROPHIL %: 0.3 % (ref 0–0.43)
KETONES, URINE: NEGATIVE MG/DL
LEUKOCYTE ESTERASE, URINE: NEGATIVE
LYMPHOCYTES ABSOLUTE: 1.7 K/CU MM
LYMPHOCYTES RELATIVE PERCENT: 22.8 % (ref 24–44)
MAGNESIUM: 1.9 MG/DL (ref 1.8–2.4)
MCH RBC QN AUTO: 30 PG (ref 27–31)
MCHC RBC AUTO-ENTMCNC: 31.2 % (ref 32–36)
MCV RBC AUTO: 96.1 FL (ref 78–100)
MONOCYTES ABSOLUTE: 0.4 K/CU MM
MONOCYTES RELATIVE PERCENT: 5.8 % (ref 0–4)
MUCUS: ABNORMAL HPF
NITRITE URINE, QUANTITATIVE: NEGATIVE
NUCLEATED RBC %: 0 %
PDW BLD-RTO: 12.5 % (ref 11.7–14.9)
PH, URINE: 8 (ref 5–8)
PLATELET # BLD: 184 K/CU MM (ref 140–440)
PMV BLD AUTO: 9.5 FL (ref 7.5–11.1)
POTASSIUM SERPL-SCNC: 3.9 MMOL/L (ref 3.5–5.1)
PRO-BNP: 580.9 PG/ML
PROT/CREAT RATIO, UR: ABNORMAL
PROTEIN UA: NEGATIVE MG/DL
RBC # BLD: 3.8 M/CU MM (ref 4.6–6.2)
RBC URINE: 1 /HPF (ref 0–3)
SEGMENTED NEUTROPHILS ABSOLUTE COUNT: 5.1 K/CU MM
SEGMENTED NEUTROPHILS RELATIVE PERCENT: 68.7 % (ref 36–66)
SODIUM BLD-SCNC: 140 MMOL/L (ref 135–145)
SODIUM URINE: 131 MMOL/L (ref 35–167)
SPECIFIC GRAVITY UA: 1 (ref 1–1.03)
TOTAL IMMATURE NEUTOROPHIL: 0.02 K/CU MM
TOTAL NUCLEATED RBC: 0 K/CU MM
TRICHOMONAS: ABNORMAL /HPF
TROPONIN T: 0.03 NG/ML
TROPONIN T: 0.03 NG/ML
URINE TOTAL PROTEIN: 5.7 MG/DL
UROBILINOGEN, URINE: 1 MG/DL (ref 0.2–1)
WBC # BLD: 7.4 K/CU MM (ref 4–10.5)
WBC UA: <1 /HPF (ref 0–2)

## 2019-03-12 PROCEDURE — 82570 ASSAY OF URINE CREATININE: CPT

## 2019-03-12 PROCEDURE — 6360000002 HC RX W HCPCS: Performed by: INTERNAL MEDICINE

## 2019-03-12 PROCEDURE — 84300 ASSAY OF URINE SODIUM: CPT

## 2019-03-12 PROCEDURE — 81001 URINALYSIS AUTO W/SCOPE: CPT

## 2019-03-12 PROCEDURE — 85025 COMPLETE CBC W/AUTO DIFF WBC: CPT

## 2019-03-12 PROCEDURE — 84484 ASSAY OF TROPONIN QUANT: CPT

## 2019-03-12 PROCEDURE — 6370000000 HC RX 637 (ALT 250 FOR IP): Performed by: INTERNAL MEDICINE

## 2019-03-12 PROCEDURE — 82962 GLUCOSE BLOOD TEST: CPT

## 2019-03-12 PROCEDURE — 84156 ASSAY OF PROTEIN URINE: CPT

## 2019-03-12 PROCEDURE — G0378 HOSPITAL OBSERVATION PER HR: HCPCS

## 2019-03-12 PROCEDURE — 36415 COLL VENOUS BLD VENIPUNCTURE: CPT

## 2019-03-12 PROCEDURE — 96376 TX/PRO/DX INJ SAME DRUG ADON: CPT

## 2019-03-12 PROCEDURE — 99221 1ST HOSP IP/OBS SF/LOW 40: CPT | Performed by: INTERNAL MEDICINE

## 2019-03-12 PROCEDURE — 96372 THER/PROPH/DIAG INJ SC/IM: CPT

## 2019-03-12 PROCEDURE — 80048 BASIC METABOLIC PNL TOTAL CA: CPT

## 2019-03-12 PROCEDURE — 83735 ASSAY OF MAGNESIUM: CPT

## 2019-03-12 PROCEDURE — 83880 ASSAY OF NATRIURETIC PEPTIDE: CPT

## 2019-03-12 PROCEDURE — 93010 ELECTROCARDIOGRAM REPORT: CPT | Performed by: INTERNAL MEDICINE

## 2019-03-12 RX ORDER — TORSEMIDE 20 MG/1
20 TABLET ORAL 2 TIMES DAILY
Qty: 180 TABLET | Refills: 1 | Status: SHIPPED | OUTPATIENT
Start: 2019-03-12 | End: 2019-05-02 | Stop reason: DRUGHIGH

## 2019-03-12 RX ORDER — DEXTROSE MONOHYDRATE 25 G/50ML
12.5 INJECTION, SOLUTION INTRAVENOUS PRN
Status: DISCONTINUED | OUTPATIENT
Start: 2019-03-12 | End: 2019-03-12 | Stop reason: HOSPADM

## 2019-03-12 RX ORDER — TORSEMIDE 20 MG/1
20 TABLET ORAL 2 TIMES DAILY
Qty: 120 TABLET | Refills: 1 | Status: SHIPPED | OUTPATIENT
Start: 2019-03-12 | End: 2019-03-12 | Stop reason: SDUPTHER

## 2019-03-12 RX ORDER — DEXTROSE MONOHYDRATE 50 MG/ML
100 INJECTION, SOLUTION INTRAVENOUS PRN
Status: DISCONTINUED | OUTPATIENT
Start: 2019-03-12 | End: 2019-03-12 | Stop reason: HOSPADM

## 2019-03-12 RX ORDER — CARVEDILOL 25 MG/1
25 TABLET ORAL 2 TIMES DAILY WITH MEALS
Qty: 60 TABLET | Refills: 3 | Status: SHIPPED | OUTPATIENT
Start: 2019-03-12 | End: 2019-07-31 | Stop reason: SDUPTHER

## 2019-03-12 RX ORDER — LOSARTAN POTASSIUM 100 MG/1
100 TABLET ORAL EVERY EVENING
Qty: 90 TABLET | Refills: 1 | Status: SHIPPED | OUTPATIENT
Start: 2019-03-12 | End: 2019-07-31 | Stop reason: SDUPTHER

## 2019-03-12 RX ORDER — NICOTINE POLACRILEX 4 MG
15 LOZENGE BUCCAL PRN
Status: DISCONTINUED | OUTPATIENT
Start: 2019-03-12 | End: 2019-03-12 | Stop reason: HOSPADM

## 2019-03-12 RX ORDER — BLOOD PRESSURE TEST KIT
KIT MISCELLANEOUS
Qty: 1 KIT | Refills: 0 | Status: SHIPPED | OUTPATIENT
Start: 2019-03-12 | End: 2020-12-21

## 2019-03-12 RX ORDER — NIFEDIPINE 90 MG/1
90 TABLET, FILM COATED, EXTENDED RELEASE ORAL DAILY
Qty: 90 TABLET | Refills: 1 | Status: SHIPPED | OUTPATIENT
Start: 2019-03-12 | End: 2019-05-20

## 2019-03-12 RX ORDER — CLONIDINE HYDROCHLORIDE 0.1 MG/1
0.1 TABLET ORAL 3 TIMES DAILY PRN
Qty: 90 TABLET | Refills: 0 | Status: SHIPPED | OUTPATIENT
Start: 2019-03-12 | End: 2019-05-02 | Stop reason: DRUGHIGH

## 2019-03-12 RX ADMIN — CLOPIDOGREL BISULFATE 75 MG: 75 TABLET ORAL at 08:45

## 2019-03-12 RX ADMIN — GABAPENTIN 600 MG: 300 CAPSULE ORAL at 08:45

## 2019-03-12 RX ADMIN — FUROSEMIDE 40 MG: 10 INJECTION, SOLUTION INTRAMUSCULAR; INTRAVENOUS at 08:44

## 2019-03-12 RX ADMIN — ASPIRIN 81 MG 81 MG: 81 TABLET ORAL at 08:45

## 2019-03-12 RX ADMIN — CARVEDILOL 25 MG: 25 TABLET, FILM COATED ORAL at 08:45

## 2019-03-12 RX ADMIN — NIFEDIPINE 90 MG: 30 TABLET, FILM COATED, EXTENDED RELEASE ORAL at 08:45

## 2019-03-12 RX ADMIN — ENOXAPARIN SODIUM 40 MG: 40 INJECTION SUBCUTANEOUS at 08:44

## 2019-03-12 RX ADMIN — DOXAZOSIN 2 MG: 4 TABLET ORAL at 08:45

## 2019-03-12 ASSESSMENT — PAIN SCALES - GENERAL: PAINLEVEL_OUTOF10: 0

## 2019-04-01 ENCOUNTER — PROCEDURE VISIT (OUTPATIENT)
Dept: CARDIOLOGY CLINIC | Age: 69
End: 2019-04-01
Payer: MEDICARE

## 2019-04-01 DIAGNOSIS — I49.5 SINUS NODE DYSFUNCTION (HCC): ICD-10-CM

## 2019-04-01 DIAGNOSIS — Z95.810 ICD (IMPLANTABLE CARDIOVERTER-DEFIBRILLATOR), DUAL, IN SITU: Primary | ICD-10-CM

## 2019-04-01 PROCEDURE — 93295 DEV INTERROG REMOTE 1/2/MLT: CPT | Performed by: INTERNAL MEDICINE

## 2019-04-01 PROCEDURE — 93296 REM INTERROG EVL PM/IDS: CPT | Performed by: INTERNAL MEDICINE

## 2019-04-01 PROCEDURE — 93297 REM INTERROG DEV EVAL ICPMS: CPT | Performed by: INTERNAL MEDICINE

## 2019-04-30 ENCOUNTER — HOSPITAL ENCOUNTER (OUTPATIENT)
Age: 69
Discharge: HOME | End: 2019-04-30
Payer: MEDICARE

## 2019-04-30 LAB
ANION GAP SERPL CALCULATED.3IONS-SCNC: 9 MMOL/L (ref 4–16)
BUN BLDV-MCNC: 12 MG/DL (ref 6–23)
CALCIUM SERPL-MCNC: 8.4 MG/DL (ref 8.3–10.6)
CHLORIDE BLD-SCNC: 103 MMOL/L (ref 99–110)
CO2: 26 MMOL/L (ref 21–32)
CREAT SERPL-MCNC: 1.5 MG/DL (ref 0.9–1.3)
GFR AFRICAN AMERICAN: 56 ML/MIN/1.73M2
GFR NON-AFRICAN AMERICAN: 47 ML/MIN/1.73M2
GLUCOSE BLD-MCNC: 227 MG/DL (ref 70–99)
POTASSIUM SERPL-SCNC: 5.2 MMOL/L (ref 3.5–5.1)
SODIUM BLD-SCNC: 138 MMOL/L (ref 135–145)

## 2019-04-30 PROCEDURE — 36415 COLL VENOUS BLD VENIPUNCTURE: CPT

## 2019-04-30 PROCEDURE — 80048 BASIC METABOLIC PNL TOTAL CA: CPT

## 2019-05-02 PROBLEM — I25.10 CAD (CORONARY ARTERY DISEASE): Status: ACTIVE | Noted: 2019-05-02

## 2019-05-02 PROBLEM — E11.40 DIABETIC NEUROPATHY (HCC): Status: ACTIVE | Noted: 2019-05-02

## 2019-05-20 PROBLEM — I10 HTN (HYPERTENSION): Status: ACTIVE | Noted: 2019-05-20

## 2019-07-09 ENCOUNTER — PROCEDURE VISIT (OUTPATIENT)
Dept: CARDIOLOGY CLINIC | Age: 69
End: 2019-07-09
Payer: MEDICARE

## 2019-07-09 DIAGNOSIS — Z95.810 ICD (IMPLANTABLE CARDIOVERTER-DEFIBRILLATOR), DUAL, IN SITU: Primary | ICD-10-CM

## 2019-07-09 DIAGNOSIS — I49.5 SINUS NODE DYSFUNCTION (HCC): ICD-10-CM

## 2019-07-09 PROCEDURE — 93296 REM INTERROG EVL PM/IDS: CPT | Performed by: INTERNAL MEDICINE

## 2019-07-09 PROCEDURE — 93297 REM INTERROG DEV EVAL ICPMS: CPT | Performed by: INTERNAL MEDICINE

## 2019-07-09 PROCEDURE — 93295 DEV INTERROG REMOTE 1/2/MLT: CPT | Performed by: INTERNAL MEDICINE

## 2019-07-25 ENCOUNTER — HOSPITAL ENCOUNTER (EMERGENCY)
Age: 69
Discharge: HOME | End: 2019-07-25
Attending: EMERGENCY MEDICINE
Payer: MEDICARE

## 2019-07-25 VITALS
DIASTOLIC BLOOD PRESSURE: 75 MMHG | TEMPERATURE: 99.5 F | WEIGHT: 270 LBS | HEART RATE: 63 BPM | BODY MASS INDEX: 36.57 KG/M2 | HEIGHT: 72 IN | OXYGEN SATURATION: 94 % | SYSTOLIC BLOOD PRESSURE: 189 MMHG | RESPIRATION RATE: 17 BRPM

## 2019-07-25 DIAGNOSIS — I10 ESSENTIAL HYPERTENSION: Primary | ICD-10-CM

## 2019-07-25 LAB
ALBUMIN SERPL-MCNC: 3.7 GM/DL (ref 3.4–5)
ALP BLD-CCNC: 84 IU/L (ref 40–128)
ALT SERPL-CCNC: 6 U/L (ref 10–40)
ANION GAP SERPL CALCULATED.3IONS-SCNC: 6 MMOL/L (ref 4–16)
AST SERPL-CCNC: 10 IU/L (ref 15–37)
BASOPHILS ABSOLUTE: 0 K/CU MM
BASOPHILS RELATIVE PERCENT: 0.4 % (ref 0–1)
BILIRUB SERPL-MCNC: 0.3 MG/DL (ref 0–1)
BUN BLDV-MCNC: 18 MG/DL (ref 6–23)
CALCIUM SERPL-MCNC: 9.2 MG/DL (ref 8.3–10.6)
CHLORIDE BLD-SCNC: 101 MMOL/L (ref 99–110)
CO2: 32 MMOL/L (ref 21–32)
CREAT SERPL-MCNC: 1.6 MG/DL (ref 0.9–1.3)
DIFFERENTIAL TYPE: ABNORMAL
EOSINOPHILS ABSOLUTE: 0.3 K/CU MM
EOSINOPHILS RELATIVE PERCENT: 3.7 % (ref 0–3)
GFR AFRICAN AMERICAN: 52 ML/MIN/1.73M2
GFR NON-AFRICAN AMERICAN: 43 ML/MIN/1.73M2
GLUCOSE BLD-MCNC: 134 MG/DL (ref 70–99)
HCT VFR BLD CALC: 39.8 % (ref 42–52)
HEMOGLOBIN: 12.5 GM/DL (ref 13.5–18)
IMMATURE NEUTROPHIL %: 0.4 % (ref 0–0.43)
LYMPHOCYTES ABSOLUTE: 1.6 K/CU MM
LYMPHOCYTES RELATIVE PERCENT: 22.6 % (ref 24–44)
MCH RBC QN AUTO: 30.3 PG (ref 27–31)
MCHC RBC AUTO-ENTMCNC: 31.4 % (ref 32–36)
MCV RBC AUTO: 96.4 FL (ref 78–100)
MONOCYTES ABSOLUTE: 0.4 K/CU MM
MONOCYTES RELATIVE PERCENT: 5.5 % (ref 0–4)
NUCLEATED RBC %: 0 %
PDW BLD-RTO: 12.5 % (ref 11.7–14.9)
PLATELET # BLD: 188 K/CU MM (ref 140–440)
PMV BLD AUTO: 9.7 FL (ref 7.5–11.1)
POTASSIUM SERPL-SCNC: 4.6 MMOL/L (ref 3.5–5.1)
RBC # BLD: 4.13 M/CU MM (ref 4.6–6.2)
SEGMENTED NEUTROPHILS ABSOLUTE COUNT: 4.8 K/CU MM
SEGMENTED NEUTROPHILS RELATIVE PERCENT: 67.4 % (ref 36–66)
SODIUM BLD-SCNC: 139 MMOL/L (ref 135–145)
TOTAL IMMATURE NEUTOROPHIL: 0.03 K/CU MM
TOTAL NUCLEATED RBC: 0 K/CU MM
TOTAL PROTEIN: 7.1 GM/DL (ref 6.4–8.2)
WBC # BLD: 7.1 K/CU MM (ref 4–10.5)

## 2019-07-25 PROCEDURE — 93005 ELECTROCARDIOGRAM TRACING: CPT | Performed by: EMERGENCY MEDICINE

## 2019-07-25 PROCEDURE — 80053 COMPREHEN METABOLIC PANEL: CPT

## 2019-07-25 PROCEDURE — 36415 COLL VENOUS BLD VENIPUNCTURE: CPT

## 2019-07-25 PROCEDURE — 85025 COMPLETE CBC W/AUTO DIFF WBC: CPT

## 2019-07-25 PROCEDURE — 99283 EMERGENCY DEPT VISIT LOW MDM: CPT

## 2019-07-25 PROCEDURE — 93010 ELECTROCARDIOGRAM REPORT: CPT | Performed by: INTERNAL MEDICINE

## 2019-07-25 PROCEDURE — 6370000000 HC RX 637 (ALT 250 FOR IP): Performed by: EMERGENCY MEDICINE

## 2019-07-25 RX ORDER — CLONIDINE HYDROCHLORIDE 0.2 MG/1
0.2 TABLET ORAL 3 TIMES DAILY
Qty: 60 TABLET | Refills: 0 | Status: SHIPPED | OUTPATIENT
Start: 2019-07-25 | End: 2019-07-31

## 2019-07-25 RX ORDER — ONDANSETRON 4 MG/1
4 TABLET, ORALLY DISINTEGRATING ORAL ONCE
Status: COMPLETED | OUTPATIENT
Start: 2019-07-25 | End: 2019-07-25

## 2019-07-25 RX ADMIN — ONDANSETRON 4 MG: 4 TABLET, ORALLY DISINTEGRATING ORAL at 16:14

## 2019-07-25 SDOH — HEALTH STABILITY: MENTAL HEALTH: HOW OFTEN DO YOU HAVE A DRINK CONTAINING ALCOHOL?: NEVER

## 2019-07-25 ASSESSMENT — PAIN SCALES - GENERAL: PAINLEVEL_OUTOF10: 9

## 2019-07-25 ASSESSMENT — PAIN DESCRIPTION - FREQUENCY: FREQUENCY: INTERMITTENT

## 2019-07-25 ASSESSMENT — PAIN DESCRIPTION - PAIN TYPE: TYPE: CHRONIC PAIN

## 2019-07-25 ASSESSMENT — PAIN DESCRIPTION - DESCRIPTORS: DESCRIPTORS: BURNING

## 2019-07-25 ASSESSMENT — PAIN DESCRIPTION - ORIENTATION: ORIENTATION: LEFT;RIGHT

## 2019-07-25 ASSESSMENT — PAIN DESCRIPTION - LOCATION: LOCATION: FOOT

## 2019-07-25 NOTE — ED PROVIDER NOTES
 H/O cardiovascular stress test 08/25/2016    H/O Doppler ultrasound 09/27/2018    Moderate disease of the right lower extremity with an JALEN of 0.72.   Moderate to severe disease of the left lower extremity with an JALEN of 0.55.   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    Neuropathy     both feet    neuropathy     PAD (peripheral artery disease) ([facility] 09/27/2018    PVD (peripheral vascular disease) ([facility]     Sick sinus syndrome ([facility]     Sleep apnea     \"sleep study 3 yrs ago- could not tolerate the cpap made me too dry\"    Spinal stenosis     Teeth missing     Upper And Lower    Type 2 diabetes mellitus without complication [facility])      Past Surgical History:   Procedure Laterality Date    CARDIAC CATHETERIZATION      per old chart done 10/2014    CARDIAC CATHETERIZATION  07/14/2017    with angiography of leg    CARDIAC CATHETERIZATION  11/20/2018    PATENT STENTS OF ALL THREE MAJOR VESSELS    CARDIAC DEFIBRILLATOR PLACEMENT  06/04/2010    [employer] [address] DR Defibrillator Implanted    COLECTOMY Right 08/26/2016    laparascopic; robotic assisted    COLONOSCOPY  08/04/2016    CORONARY ANGIOPLASTY      \"15 Heart Stents\"    CORONARY ANGIOPLASTY WITH STENT PLACEMENT      per old chart had angio with stent to circumflex and obtuse marginal artery at [facility] 5/2010( old chart also gives hx of stent placement done 2000,2004 and 2005)    DENTAL SURGERY      Teeth Extracted In Past    PACEMAKER PLACEMENT  06/04/2010    [employer] Sec[address] DR Defibrillator Implanted    TOE AMPUTATION Right 09/12/2017    Rt 3rd toe    TOE AMPUTATION Right 01/09/2018     Right 5th toe amputation and Toenails trimmed left 2,3,4 and 5th toes    VASCULAR SURGERY      per old chart had balloon angioplasty right superfical femoral artery,right popliteal artery,,right ant.tibial artery, right tibioperoneal trunk, and right post.tibial artery wna stent placement right popliteal artery and superfical femoral artery 7/2012     Family History   Problem Relation Age of Onset    Diabetes Mother     Stroke Mother     High Blood Pressure Mother     Vision Loss Mother     Cancer Father         Prostate Cancer    Diabetes Sister     Neuropathy Sister     Other Sister         \"Breathing Problems\"    Heart Disease Sister     Early Death Sister 62        Heart Complications    Cancer Brother         \"Stomach Cancer\"    High Blood Pressure Brother     Diabetes Brother     Heart Disease Brother     High Blood Pressure Brother     Cancer Son         \"Testicle Cancer\"     Social History     Socioeconomic History    Marital status:       Spouse name: Not on file    Number of children: Not on file    Years of education: Not on file    Highest education level: Not on file   Occupational History    Not on file   Social Needs    Financial resource strain: Not on file    Food insecurity:     Worry: Not on file     Inability: Not on file    Transportation needs:     Medical: Not on file     Non-medical: Not on file   Tobacco Use    Smoking status: Current Some Day Smoker     Packs/day: 0.00     Years: 36.00     Pack years: 0.00     Types: Cigars     Start date: 1/1/1980    Smokeless tobacco: Never Used   Substance and Sexual Activity    Alcohol use: No     Frequency: Never    Drug use: Yes     Types: Marijuana    Sexual activity: Never   Lifestyle    Physical activity:     Days per week: Not on file     Minutes per session: Not on file    Stress: Not on file   Relationships    Social connections:     Talks on phone: Not on file     Gets together: Not on 07/25/19 1516 17      Temp 07/25/19 1516 99.5 °F (37.5 °C)      Temp Source 07/25/19 1516 Oral      SpO2 07/25/19 1554 94 %      Weight 07/25/19 1516 270 lb (122.5 kg)      Height 07/25/19 1516 6' (1.829 m)      Head Circumference --       Peak Flow --       Pain Score --       Pain Loc --       Pain Edu? --       Excl. in [address]? --        My pulse ox interpretation is - normal    General appearance:  No acute distress. Skin:  Warm. Dry. Eye:  Extraocular movements intact. Ears, nose, mouth and throat:  Oral mucosa moist   Neck:  Trachea midline. Extremity:  No swelling. Normal ROM     Heart:  Regular  Perfusion:  intact  Respiratory:   Respirations nonlabored. Abdominal:  Non distended. Neurological:  Alert and oriented times 3. All extremities equally, cranial nerves grossly intact    I have reviewed and interpreted all of the currently available lab results from this visit (if applicable):     Radiographs (if obtained):    [] Radiologist's Report Reviewed:  No orders to display         EKG (if obtained): (All EKG's are interpreted by myself in the absence of a cardiologist)    Chart review shows recent radiographs:  No results found. MDM:  Here with high blood pressure, essentially is asymptomatic she is generally does not feel well, is being managed by his nephrologist.  Labs, he has a renal insufficiency but not significantly worsened from baseline nephrology saw him at bedside would like to add clonidine prescriptions provided outpatient follow-up instructions given he understands and agrees return warnings given. Clinical Impression:  1. Essential hypertension      Disposition referral (if applicable):   Florencio Nickerson MD  [address] [address]  [phone]    In 2 days      [facility]  [address] [facility] [address]  In 2 days      Miranda Wing DPM  [address] [facility]

## 2019-07-29 LAB
EKG ATRIAL RATE: 64 BPM
EKG DIAGNOSIS: NORMAL
EKG P AXIS: 38 DEGREES
EKG P-R INTERVAL: 192 MS
EKG Q-T INTERVAL: 428 MS
EKG QRS DURATION: 94 MS
EKG QTC CALCULATION (BAZETT): 441 MS
EKG R AXIS: 22 DEGREES
EKG T AXIS: 152 DEGREES
EKG VENTRICULAR RATE: 64 BPM

## 2019-09-05 ENCOUNTER — HOSPITAL ENCOUNTER (OUTPATIENT)
Age: 69
Discharge: HOME | End: 2019-09-05
Payer: MEDICARE

## 2019-09-05 LAB
ANION GAP SERPL CALCULATED.3IONS-SCNC: 10 MMOL/L (ref 4–16)
BUN BLDV-MCNC: 28 MG/DL (ref 6–23)
CALCIUM SERPL-MCNC: 9.2 MG/DL (ref 8.3–10.6)
CHLORIDE BLD-SCNC: 98 MMOL/L (ref 99–110)
CO2: 29 MMOL/L (ref 21–32)
CREAT SERPL-MCNC: 1.7 MG/DL (ref 0.9–1.3)
GFR AFRICAN AMERICAN: 49 ML/MIN/1.73M2
GFR NON-AFRICAN AMERICAN: 40 ML/MIN/1.73M2
GLUCOSE BLD-MCNC: 218 MG/DL (ref 70–99)
MAGNESIUM: 2.3 MG/DL (ref 1.8–2.4)
PHOSPHORUS: 2.7 MG/DL (ref 2.5–4.9)
POTASSIUM SERPL-SCNC: 4.7 MMOL/L (ref 3.5–5.1)
SODIUM BLD-SCNC: 137 MMOL/L (ref 135–145)

## 2019-09-05 PROCEDURE — 80048 BASIC METABOLIC PNL TOTAL CA: CPT

## 2019-09-05 PROCEDURE — 83735 ASSAY OF MAGNESIUM: CPT

## 2019-09-05 PROCEDURE — 84100 ASSAY OF PHOSPHORUS: CPT

## 2019-09-05 PROCEDURE — 36415 COLL VENOUS BLD VENIPUNCTURE: CPT

## 2019-09-06 ENCOUNTER — HOSPITAL ENCOUNTER (OUTPATIENT)
Age: 69
Setting detail: SPECIMEN
Discharge: HOME | End: 2019-09-06
Payer: MEDICARE

## 2019-09-06 LAB
BACTERIA: ABNORMAL /HPF
BILIRUBIN URINE: NEGATIVE MG/DL
BLOOD, URINE: NEGATIVE
CLARITY: CLEAR
COLOR: YELLOW
CREATININE URINE: 101.3 MG/DL (ref 39–259)
GLUCOSE, URINE: NEGATIVE MG/DL
KETONES, URINE: NEGATIVE MG/DL
LEUKOCYTE ESTERASE, URINE: ABNORMAL
NITRITE URINE, QUANTITATIVE: NEGATIVE
PH, URINE: 6 (ref 5–8)
PROT/CREAT RATIO, UR: ABNORMAL
PROTEIN UA: 30 MG/DL
RBC URINE: 1 /HPF (ref 0–3)
SPECIFIC GRAVITY UA: 1.01 (ref 1–1.03)
SQUAMOUS EPITHELIAL: 2 /HPF
TRICHOMONAS: ABNORMAL /HPF
URINE TOTAL PROTEIN: 37.6 MG/DL
UROBILINOGEN, URINE: 1 MG/DL (ref 0.2–1)
WBC UA: 2 /HPF (ref 0–2)

## 2019-09-06 PROCEDURE — 84156 ASSAY OF PROTEIN URINE: CPT

## 2019-09-06 PROCEDURE — 81001 URINALYSIS AUTO W/SCOPE: CPT

## 2019-09-06 PROCEDURE — 82570 ASSAY OF URINE CREATININE: CPT

## 2019-10-30 ENCOUNTER — TELEPHONE (OUTPATIENT)
Dept: CARDIOLOGY CLINIC | Age: 69
End: 2019-10-30

## 2019-10-30 ENCOUNTER — PROCEDURE VISIT (OUTPATIENT)
Dept: CARDIOLOGY CLINIC | Age: 69
End: 2019-10-30
Payer: MEDICARE

## 2019-10-30 DIAGNOSIS — I49.5 SINUS NODE DYSFUNCTION (HCC): ICD-10-CM

## 2019-10-30 DIAGNOSIS — Z95.810 ICD (IMPLANTABLE CARDIOVERTER-DEFIBRILLATOR), DUAL, IN SITU: Primary | ICD-10-CM

## 2019-10-30 PROCEDURE — 93295 DEV INTERROG REMOTE 1/2/MLT: CPT | Performed by: INTERNAL MEDICINE

## 2019-10-30 PROCEDURE — 93296 REM INTERROG EVL PM/IDS: CPT | Performed by: INTERNAL MEDICINE

## 2019-10-30 PROCEDURE — 93297 REM INTERROG DEV EVAL ICPMS: CPT | Performed by: INTERNAL MEDICINE

## 2019-12-19 ENCOUNTER — OFFICE VISIT (OUTPATIENT)
Dept: SURGERY | Age: 69
End: 2019-12-19
Payer: MEDICARE

## 2019-12-19 VITALS
HEIGHT: 72 IN | DIASTOLIC BLOOD PRESSURE: 78 MMHG | WEIGHT: 270.06 LBS | BODY MASS INDEX: 36.58 KG/M2 | HEART RATE: 66 BPM | SYSTOLIC BLOOD PRESSURE: 174 MMHG | OXYGEN SATURATION: 97 %

## 2019-12-19 PROCEDURE — 2022F DILAT RTA XM EVC RTNOPTHY: CPT | Performed by: SURGERY

## 2019-12-19 PROCEDURE — 4004F PT TOBACCO SCREEN RCVD TLK: CPT | Performed by: SURGERY

## 2019-12-19 PROCEDURE — 99213 OFFICE O/P EST LOW 20 MIN: CPT | Performed by: SURGERY

## 2019-12-19 PROCEDURE — G8484 FLU IMMUNIZE NO ADMIN: HCPCS | Performed by: SURGERY

## 2019-12-19 PROCEDURE — G8598 ASA/ANTIPLAT THER USED: HCPCS | Performed by: SURGERY

## 2019-12-19 PROCEDURE — 1123F ACP DISCUSS/DSCN MKR DOCD: CPT | Performed by: SURGERY

## 2019-12-19 PROCEDURE — 4040F PNEUMOC VAC/ADMIN/RCVD: CPT | Performed by: SURGERY

## 2019-12-19 PROCEDURE — G8417 CALC BMI ABV UP PARAM F/U: HCPCS | Performed by: SURGERY

## 2019-12-19 PROCEDURE — 3046F HEMOGLOBIN A1C LEVEL >9.0%: CPT | Performed by: SURGERY

## 2019-12-19 PROCEDURE — G8428 CUR MEDS NOT DOCUMENT: HCPCS | Performed by: SURGERY

## 2019-12-19 PROCEDURE — 3017F COLORECTAL CA SCREEN DOC REV: CPT | Performed by: SURGERY

## 2020-01-05 ASSESSMENT — ENCOUNTER SYMPTOMS
EYE REDNESS: 0
COLOR CHANGE: 0
STRIDOR: 0
EYE ITCHING: 0
RECTAL PAIN: 0
PHOTOPHOBIA: 0
SORE THROAT: 0
APNEA: 0
CHOKING: 0
ANAL BLEEDING: 0
CONSTIPATION: 0
BACK PAIN: 0

## 2020-01-05 NOTE — PROGRESS NOTES
Chief Complaint   Patient presents with    Follow-up     Abscess to Right middle toe, Black spots on Left toes         SUBJECTIVE:  HPI: Patient is herewith complaints of right foot worsening dry gangrene of the middle toe. Patient has had history of amputations of the right fourth and fifth toes. The middle gangrene. Recently after removing his shoes. He has severe neuropathy as a result of his diabetes. Patient denies any fever or chills. He has lost some weight over the past few months. He denies any malaise. .    I have reviewed the patient's(pertinent information to this visit) medical history, family history(scanned in  the [address] [place] [address] under \"patient questioner\"), social history and review of systems with the patient today in the office.             Past Surgical History:   Procedure Laterality Date    CARDIAC CATHETERIZATION      per old chart done 10/2014    CARDIAC CATHETERIZATION  07/14/2017    with angiography of leg    CARDIAC CATHETERIZATION  11/20/2018    PATENT STENTS OF ALL THREE MAJOR VESSELS    CARDIAC DEFIBRILLATOR PLACEMENT  06/04/2010    [employer] [address] DR Defibrillator Implanted    COLECTOMY Right 08/26/2016    laparascopic; robotic assisted    COLONOSCOPY  08/04/2016    CORONARY ANGIOPLASTY      \"15 Heart Stents\"    CORONARY ANGIOPLASTY WITH STENT PLACEMENT      per old chart had angio with stent to circumflex and obtuse marginal artery at [facility] 5/2010( old chart also gives hx of stent placement done 2000,2004 and 2005)    DENTAL SURGERY      Teeth Extracted In Past    PACEMAKER PLACEMENT  06/04/2010    [employer] Secura DR Defibrillator Implanted    TOE AMPUTATION Right 09/12/2017    Rt 3rd toe    TOE AMPUTATION Right 01/09/2018     Right 5th toe amputation and Toenails trimmed left 2,3,4 and 5th toes    VASCULAR SURGERY      per old chart had balloon angioplasty right superfical femoral artery,right popliteal artery,,right ant.tibial artery, right tibioperoneal trunk, and right post.tibial artery wna stent placement right popliteal artery and superfical femoral artery 7/2012     Past Medical History:   Diagnosis Date    Acid reflux     Acute MI [facility]) 2004, 2008    Arthritis     Back    Broken teeth     Upper Front    CAD (coronary artery disease)     Sees Dr. Elida Wright [facility])     per old chart    CHF (congestive heart failure) (HCC)     Chronic back pain     Chronic kidney disease     STAGE 3 KIDNEY FAILURE- \"from my diabetes- do not follow with any one- have seen Dr Lexis Du in the past\"    Diabetes mellitus ([address] Dx 1965    per old chart pt has been diabetic since age 13    Diabetic neuropathy ([address]     \"in my feet\"    H/O cardiovascular stress test 08/25/2016    H/O Doppler ultrasound 09/27/2018    Moderate disease of the right lower extremity with an JALEN of 0.72.   Moderate to severe disease of the left lower extremity with an JALEN of 0.55.   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    Neuropathy     both feet    neuropathy     PAD (peripheral artery disease) ([address] 09/27/2018    PVD (peripheral vascular disease) ([address]     Sick sinus syndrome ([address]     Sleep apnea     \"sleep study 3 yrs ago- could not tolerate the cpap made me too dry\"    Spinal stenosis     Teeth missing     Upper And Lower    Type 2 diabetes mellitus without complication ([address]      Family History   Problem Relation Age of Onset    Diabetes Mother     Stroke Mother     High Blood Pressure Mother     Vision Loss Mother     Cancer Father         Prostate Cancer    Diabetes Sister     Neuropathy carvedilol (COREG) 25 MG tablet TAKE 1 TABLET BY MOUTH TWICE A DAY WITH MEALS 180 tablet 3    guanFACINE (TENEX) 1 MG tablet Take 2 tablets by mouth nightly 30 tablet 3    metOLazone (ZAROXOLYN) 5 MG tablet Take 1 tablet by mouth daily 30 tablet 5    torsemide (DEMADEX) 20 MG tablet Take 1 tablet by mouth 2 times daily 90 tablet 5    losartan (COZAAR) 100 MG tablet Take 1 tablet by mouth every evening 90 tablet 3    Blood Pressure KIT BID for record down in log book 1 kit 0    insulin aspart (NOVOLOG) 100 UNIT/ML injection vial Inject into the skin 3 times daily (before meals) Patient states he gives himself 5 units if BS > 151      clopidogrel (PLAVIX) 75 MG tablet Take 1 tablet by mouth daily 30 tablet 11    gabapentin (NEURONTIN) 600 MG tablet Take 600 mg by mouth 2 times daily. [place] insulin glargine (LANTUS SOLOSTAR) 100 UNIT/ML injection pen Inject 40 Units into the skin nightly (Patient taking differently: Inject 45 Units into the skin nightly ) 5 Pen 3     No current facility-administered medications for this visit. Allergies   Allergen Reactions    Pcn [Penicillins] Hives    Fentanyl Itching       Review of Systems:         Review of Systems   Constitutional: Negative for chills and fever. HENT: Negative for ear pain, mouth sores, sore throat and tinnitus. Eyes: Negative for photophobia, redness and itching. Respiratory: Negative for apnea, choking and stridor. Cardiovascular: Negative for chest pain and palpitations. Gastrointestinal: Negative for anal bleeding, constipation and rectal pain. Endocrine: Negative for polydipsia. Genitourinary: Negative for enuresis, flank pain and hematuria. Musculoskeletal: Negative for back pain, joint swelling and myalgias. Skin: Positive for wound. Negative for color change and pallor. Allergic/Immunologic: Negative for environmental allergies. Neurological: Negative for syncope and speech difficulty.    Psychiatric/Behavioral: Negative for confusion and hallucinations. OBJECTIVE:  Physical Exam:    BP (!) 174/78 (Site: Left Upper Arm, Position: Sitting, Cuff Size: Large Adult)   Pulse 66   Ht 6' (1.829 m)   Wt 270 lb 1 oz (122.5 kg)   SpO2 97%   BMI 36.63 kg/m²      Physical Exam  Constitutional:       Appearance: He is well-developed. HENT:      Head: Normocephalic. Eyes:      Pupils: Pupils are equal, round, and reactive to light. Neck:      Musculoskeletal: Normal range of motion and neck supple. Cardiovascular:      Rate and Rhythm: Normal rate. Pulmonary:      Effort: Pulmonary effort is normal.   Abdominal:      General: There is no distension. Palpations: Abdomen is soft. There is no mass. Tenderness: There is no tenderness. There is no guarding or rebound. Musculoskeletal: Normal range of motion. Feet:    Skin:     General: Skin is warm. Neurological:      Mental Status: He is alert and oriented to person, place, and time. ASSESSMENT:  1. Other diabetic neurological complication associated with type 2 diabetes mellitus ([facility]    2. Toe gangrene ([facility]          PLAN:  Treatment: Patient has right middle toe dragging. Continue to treat it locally with Betadine. He is likely to lose the distal phalanx. Follow-up in 1 month. .  Patient counseled on risks, benefits, and alternatives of treatment plan at length today. Patient states an understanding and willingness to proceed with plan. No orders of the defined types were placed in this encounter. No orders of the defined types were placed in this encounter. Follow Up:  No follow-ups on file.       Jessie Anaya MD

## 2020-01-16 ENCOUNTER — OFFICE VISIT (OUTPATIENT)
Dept: SURGERY | Age: 70
End: 2020-01-16
Payer: MEDICARE

## 2020-01-16 VITALS — DIASTOLIC BLOOD PRESSURE: 78 MMHG | HEART RATE: 69 BPM | OXYGEN SATURATION: 98 % | SYSTOLIC BLOOD PRESSURE: 160 MMHG

## 2020-01-16 PROCEDURE — 99213 OFFICE O/P EST LOW 20 MIN: CPT | Performed by: SURGERY

## 2020-01-16 PROCEDURE — 4004F PT TOBACCO SCREEN RCVD TLK: CPT | Performed by: SURGERY

## 2020-01-16 PROCEDURE — G8427 DOCREV CUR MEDS BY ELIG CLIN: HCPCS | Performed by: SURGERY

## 2020-01-16 PROCEDURE — G8417 CALC BMI ABV UP PARAM F/U: HCPCS | Performed by: SURGERY

## 2020-01-16 PROCEDURE — 3017F COLORECTAL CA SCREEN DOC REV: CPT | Performed by: SURGERY

## 2020-01-16 PROCEDURE — 2022F DILAT RTA XM EVC RTNOPTHY: CPT | Performed by: SURGERY

## 2020-01-16 PROCEDURE — 3046F HEMOGLOBIN A1C LEVEL >9.0%: CPT | Performed by: SURGERY

## 2020-01-16 PROCEDURE — G8484 FLU IMMUNIZE NO ADMIN: HCPCS | Performed by: SURGERY

## 2020-01-16 PROCEDURE — 1123F ACP DISCUSS/DSCN MKR DOCD: CPT | Performed by: SURGERY

## 2020-01-16 PROCEDURE — 4040F PNEUMOC VAC/ADMIN/RCVD: CPT | Performed by: SURGERY

## 2020-01-16 RX ORDER — GABAPENTIN 600 MG/1
600 TABLET ORAL 2 TIMES DAILY
Qty: 28 TABLET | Refills: 0 | Status: SHIPPED | OUTPATIENT
Start: 2020-01-16 | End: 2020-02-21

## 2020-01-16 RX ORDER — NIFEDIPINE 90 MG/1
TABLET, FILM COATED, EXTENDED RELEASE ORAL
COMMUNITY
Start: 2020-01-08 | End: 2020-02-21

## 2020-01-16 RX ORDER — DULAGLUTIDE 0.75 MG/.5ML
INJECTION, SOLUTION SUBCUTANEOUS WEEKLY
Status: ON HOLD | COMMUNITY
Start: 2020-01-05 | End: 2020-12-13 | Stop reason: HOSPADM

## 2020-01-16 RX ORDER — PEN NEEDLE, DIABETIC 29 G X1/2"
NEEDLE, DISPOSABLE MISCELLANEOUS
COMMUNITY
Start: 2019-12-29 | End: 2020-12-21

## 2020-01-16 ASSESSMENT — ENCOUNTER SYMPTOMS
CHOKING: 0
EYE ITCHING: 0
BACK PAIN: 0
RECTAL PAIN: 0
ANAL BLEEDING: 0
SORE THROAT: 0
PHOTOPHOBIA: 0
EYE REDNESS: 0
APNEA: 0
CONSTIPATION: 0
COLOR CHANGE: 0
STRIDOR: 0

## 2020-01-16 NOTE — PROGRESS NOTES
Chief Complaint   Patient presents with    Follow-up     2 week F/U Left Foot Check         SUBJECTIVE:  HPI: Patient is herewith complaints of right foot worsening dry gangrene of the middle toe. Patient has had history of amputations of the right fourth and fifth toes. The middle gangrene. Recently after removing his shoes. He has severe neuropathy as a result of his diabetes. Patient denies any fever or chills. He has lost some weight over the past few months. He denies any malaise. .    I have reviewed the patient's(pertinent information to this visit) medical history, family history(scanned in  the [address] [place] [address] under \"patient questioner\"), social history and review of systems with the patient today in the office.             Past Surgical History:   Procedure Laterality Date    CARDIAC CATHETERIZATION      per old chart done 10/2014    CARDIAC CATHETERIZATION  07/14/2017    with angiography of leg    CARDIAC CATHETERIZATION  11/20/2018    PATENT STENTS OF ALL THREE MAJOR VESSELS    CARDIAC DEFIBRILLATOR PLACEMENT  06/04/2010    [employer] Sec[address] DR Defibrillator Implanted    COLECTOMY Right 08/26/2016    laparascopic; robotic assisted    COLONOSCOPY  08/04/2016    CORONARY ANGIOPLASTY      \"15 Heart Stents\"    CORONARY ANGIOPLASTY WITH STENT PLACEMENT      per old chart had angio with stent to circumflex and obtuse marginal artery at [facility] 5/2010( old chart also gives hx of stent placement done 2000,2004 and 2005)   [facility]      Teeth Extracted In Past    PACEMAKER PLACEMENT  06/04/2010    [employer]tronic Secura DR Defibrillator Implanted    TOE AMPUTATION Right 09/12/2017    Rt 3rd toe    TOE AMPUTATION Right 01/09/2018     Right 5th toe amputation and Toenails trimmed left 2,3,4 and 5th toes    VASCULAR SURGERY      per old chart had balloon angioplasty right superfical femoral artery,right popliteal artery,,right ant.tibial artery, right tibioperoneal trunk, and right post.tibial artery wna myalgias. Skin: Positive for wound. Negative for color change and pallor. Allergic/Immunologic: Negative for environmental allergies. Neurological: Negative for syncope and speech difficulty. Psychiatric/Behavioral: Negative for confusion and hallucinations. OBJECTIVE:  Physical Exam:    BP (!) 160/78 (Site: Right Upper Arm, Position: Sitting, Cuff Size: Medium Adult)   Pulse 69   SpO2 98%      Physical Exam  Constitutional:       Appearance: He is well-developed. HENT:      Head: Normocephalic. Eyes:      Pupils: Pupils are equal, round, and reactive to light. Neck:      Musculoskeletal: Normal range of motion and neck supple. Cardiovascular:      Rate and Rhythm: Normal rate. Pulmonary:      Effort: Pulmonary effort is normal.   Abdominal:      General: There is no distension. Palpations: Abdomen is soft. There is no mass. Tenderness: There is no tenderness. There is no guarding or rebound. Musculoskeletal: Normal range of motion. Feet:    Skin:     General: Skin is warm. Neurological:      Mental Status: He is alert and oriented to person, place, and time. ASSESSMENT:  No diagnosis found. PLAN:  Treatment: Patient has been doing well and his right foot has improved. The left great toe is also with dry gangrene. Will cont to apply betadine. F/u as prn. Patient counseled on risks, benefits, and alternatives of treatment plan at length today. Patient states an understanding and willingness to proceed with plan. No orders of the defined types were placed in this encounter. Orders Placed This Encounter   Medications    gabapentin (NEURONTIN) 600 MG tablet     Sig: Take 1 tablet by mouth 2 times daily for 14 days. Dispense:  28 tablet     Refill:  0        Follow Up:  No follow-ups on file.       Halie Maldonado MD

## 2020-02-11 ENCOUNTER — PROCEDURE VISIT (OUTPATIENT)
Dept: CARDIOLOGY CLINIC | Age: 70
End: 2020-02-11
Payer: MEDICARE

## 2020-02-11 PROCEDURE — 93295 DEV INTERROG REMOTE 1/2/MLT: CPT | Performed by: INTERNAL MEDICINE

## 2020-02-11 PROCEDURE — 93296 REM INTERROG EVL PM/IDS: CPT | Performed by: INTERNAL MEDICINE

## 2020-02-20 ENCOUNTER — HOSPITAL ENCOUNTER (OUTPATIENT)
Age: 70
Discharge: HOME | End: 2020-02-20
Payer: MEDICARE

## 2020-02-20 ENCOUNTER — TELEPHONE (OUTPATIENT)
Dept: CARDIOLOGY CLINIC | Age: 70
End: 2020-02-20

## 2020-02-20 LAB
ANION GAP SERPL CALCULATED.3IONS-SCNC: 12 MMOL/L (ref 4–16)
BUN BLDV-MCNC: 11 MG/DL (ref 6–23)
CALCIUM SERPL-MCNC: 8.8 MG/DL (ref 8.3–10.6)
CHLORIDE BLD-SCNC: 100 MMOL/L (ref 99–110)
CO2: 27 MMOL/L (ref 21–32)
CREAT SERPL-MCNC: 1.4 MG/DL (ref 0.9–1.3)
GFR AFRICAN AMERICAN: >60 ML/MIN/1.73M2
GFR NON-AFRICAN AMERICAN: 50 ML/MIN/1.73M2
GLUCOSE BLD-MCNC: 182 MG/DL (ref 70–99)
MAGNESIUM: 2.3 MG/DL (ref 1.8–2.4)
PHOSPHORUS: 2.4 MG/DL (ref 2.5–4.9)
POTASSIUM SERPL-SCNC: 5 MMOL/L (ref 3.5–5.1)
SODIUM BLD-SCNC: 139 MMOL/L (ref 135–145)

## 2020-02-20 PROCEDURE — 84100 ASSAY OF PHOSPHORUS: CPT

## 2020-02-20 PROCEDURE — 83735 ASSAY OF MAGNESIUM: CPT

## 2020-02-20 PROCEDURE — 80048 BASIC METABOLIC PNL TOTAL CA: CPT

## 2020-02-20 PROCEDURE — 36415 COLL VENOUS BLD VENIPUNCTURE: CPT

## 2020-02-20 PROCEDURE — 84156 ASSAY OF PROTEIN URINE: CPT

## 2020-03-19 ENCOUNTER — OFFICE VISIT (OUTPATIENT)
Dept: SURGERY | Age: 70
End: 2020-03-19
Payer: MEDICARE

## 2020-03-19 VITALS
HEART RATE: 62 BPM | HEIGHT: 72 IN | WEIGHT: 251 LBS | SYSTOLIC BLOOD PRESSURE: 138 MMHG | OXYGEN SATURATION: 96 % | BODY MASS INDEX: 34 KG/M2 | DIASTOLIC BLOOD PRESSURE: 80 MMHG

## 2020-03-19 PROCEDURE — 97597 DBRDMT OPN WND 1ST 20 CM/<: CPT | Performed by: SURGERY

## 2020-03-19 RX ORDER — CEPHALEXIN 500 MG/1
500 CAPSULE ORAL 3 TIMES DAILY
Qty: 30 CAPSULE | Refills: 0 | Status: SHIPPED | OUTPATIENT
Start: 2020-03-19 | End: 2020-09-01

## 2020-03-19 ASSESSMENT — ENCOUNTER SYMPTOMS
APNEA: 0
CONSTIPATION: 0
STRIDOR: 0
RECTAL PAIN: 0
EYE ITCHING: 0
COLOR CHANGE: 0
PHOTOPHOBIA: 0
CHOKING: 0
BACK PAIN: 0
EYE REDNESS: 0
SORE THROAT: 0
ANAL BLEEDING: 0

## 2020-03-19 NOTE — PROGRESS NOTES
Chief Complaint   Patient presents with    Follow-up     Pt f/u gangrene on left foot         SUBJECTIVE:  HPI: Patient is herewith complaints of left foot wound. This is new since he started wearing new shoes. He noticed foul odor two days ago which concerned him. Pt is with known diabetic foot wounds known to me. He has had some debridements and toe amputations as well. He denies fever chills. I have reviewed the patient's(pertinent information to this visit) medical history, family history(scanned in  the [address] [place] [address] under \"patient questioner\"), social history and review of systems with the patient today in the office.             Past Surgical History:   Procedure Laterality Date    CARDIAC CATHETERIZATION      per old chart done 10/2014    CARDIAC CATHETERIZATION  07/14/2017    with angiography of leg    CARDIAC CATHETERIZATION  11/20/2018    PATENT STENTS OF ALL THREE MAJOR VESSELS    CARDIAC DEFIBRILLATOR PLACEMENT  06/04/2010    [employer] [address] [employer] Defibrillator Implanted    COLECTOMY Right 08/26/2016    laparascopic; robotic assisted    COLONOSCOPY  08/04/2016    CORONARY ANGIOPLASTY      \"15 Heart Stents\"    CORONARY ANGIOPLASTY WITH STENT PLACEMENT      per old chart had angio with stent to circumflex and obtuse marginal artery at [facility] 5/2010( old chart also gives hx of stent placement done 2000,2004 and 2005)   [facility]      Teeth Extracted In Past    PACEMAKER PLACEMENT  06/04/2010    [employer] [address] DR Defibrillator Implanted    TOE AMPUTATION Right 09/12/2017    Rt 3rd toe    TOE AMPUTATION Right 01/09/2018     Right 5th toe amputation and Toenails trimmed left 2,3,4 and 5th toes    VASCULAR SURGERY      per old chart had balloon angioplasty right superfical femoral artery,right popliteal artery,,right ant.tibial artery, right tibioperoneal trunk, and right post.tibial artery wna stent placement right popliteal artery and superfical femoral artery 7/2012     Past Medical History: Diagnosis Date    Acid reflux     Acute MI [facility]) 2004, 2008    Arthritis     Back    Broken teeth     Upper Front    CAD (coronary artery disease)     Sees Dr. Begum [facility])     per old chart    CH[facility]congestive heart failure) [facility])     Chronic back pain     Chronic kidney disease     STAGE 3 KIDNEY FAILURE- \"from my diabetes- do not follow with any one- have seen Dr Modesto Winter in the past\"    Diabetes mellitus ([facility] Dx 1965    per old chart pt has been diabetic since age 13    Diabetic neuropathy ([facility]     \"in my feet\"    H/O cardiovascular stress test 08/25/2016    H/O Doppler ultrasound 09/27/2018    Moderate disease of the right lower extremity with an JALEN of 0.72.   Moderate to severe disease of the left lower extremity with an JALEN of 0.55.   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    Neuropathy     both feet    neuropathy     PAD (peripheral artery disease) ([facility] 09/27/2018    PVD (peripheral vascular disease) ([address]     Sick sinus syndrome ([address]     Sleep apnea     \"sleep study 3 yrs ago- could not tolerate the cpap made me too dry\"    Spinal stenosis     Teeth missing     Upper And Lower    Type 2 diabetes mellitus without complication ([address]      Family History   Problem Relation Age of Onset    Diabetes Mother     Stroke Mother     High Blood Pressure Mother     Vision Loss Mother     Cancer Father         Prostate Cancer    Diabetes Sister     Neuropathy Sister     Other Sister         \"Breathing Problems\"    Heart Disease Sister     Early Death Sister 62        Heart times daily 30 capsule 0    collagenase (SANTYL) 250 UNIT/GM ointment Apply to left foot wound topically daily. 1 Tube 0    amLODIPine (NORVASC) 10 MG tablet Take 10 mg by mouth daily      doxazosin (CARDURA) 2 MG tablet Take 1 tablet by mouth every evening 30 tablet 3    guanFACINE (TENEX) 1 MG tablet TAKE 1 TABLET BY MOUTH EVERY DAY AT NIGHT 90 tablet 1    TRULICITY 7.23 WP/4.5ON SOPN       BD ULTRA-FINE PEN NEEDLES 29G X 12.7MM MISC       VENTOLIN  (90 Base) MCG/ACT inhaler       carvedilol (COREG) 25 MG tablet TAKE 1 TABLET BY MOUTH TWICE A DAY WITH MEALS 180 tablet 3    metOLazone (ZAROXOLYN) 5 MG tablet Take 1 tablet by mouth daily 30 tablet 5    torsemide (DEMADEX) 20 MG tablet Take 1 tablet by mouth 2 times daily (Patient taking differently: Take 20 mg by mouth daily ) 90 tablet 5    losartan (COZAAR) 100 MG tablet Take 1 tablet by mouth every evening 90 tablet 3    Blood Pressure KIT BID for record down in log book 1 kit 0    clopidogrel (PLAVIX) 75 MG tablet Take 1 tablet by mouth daily 30 tablet 11    gabapentin (NEURONTIN) 600 MG tablet Take 600 mg by mouth 3 times daily.  insulin glargine (LANTUS SOLOSTAR) 100 UNIT/ML injection pen Inject 40 Units into the skin nightly (Patient taking differently: Inject 45 Units into the skin nightly ) 5 Pen 3     No current facility-administered medications for this visit. Allergies   Allergen Reactions    Pcn [Penicillins] Hives    Fentanyl Itching       Review of Systems:         Review of Systems   Constitutional: Negative for chills and fever. HENT: Negative for ear pain, mouth sores, sore throat and tinnitus. Eyes: Negative for photophobia, redness and itching. Respiratory: Negative for apnea, choking and stridor. Cardiovascular: Negative for chest pain and palpitations. Gastrointestinal: Negative for anal bleeding, constipation and rectal pain. Endocrine: Negative for polydipsia.    Genitourinary: Negative for Dispense:  1 Package     Refill:  3    cephALEXin (KEFLEX) 500 MG capsule     Sig: Take 1 capsule by mouth 3 times daily     Dispense:  30 capsule     Refill:  0    collagenase (SANTYL) 250 UNIT/GM ointment     Sig: Apply to left foot wound topically daily. Dispense:  1 Tube     Refill:  0        Follow Up:  No follow-ups on file.       Naima Hansen MD

## 2020-03-20 ENCOUNTER — TELEPHONE (OUTPATIENT)
Dept: SURGERY | Age: 70
End: 2020-03-20

## 2020-03-20 RX ORDER — ALGINATE DRESSING 2" X 2"
1 BANDAGE TOPICAL DAILY
Qty: 30 EACH | Refills: 3 | Status: ON HOLD | OUTPATIENT
Start: 2020-03-20 | End: 2022-10-24 | Stop reason: HOSPADM

## 2020-03-25 PROBLEM — I25.10 CAD (CORONARY ARTERY DISEASE): Status: RESOLVED | Noted: 2019-05-02 | Resolved: 2020-03-24

## 2020-03-25 PROBLEM — I25.10 CAD (CORONARY ARTERY DISEASE): Status: RESOLVED | Noted: 2019-01-29 | Resolved: 2020-03-24

## 2020-03-25 PROBLEM — I10 HTN (HYPERTENSION): Status: RESOLVED | Noted: 2018-02-09 | Resolved: 2020-03-24

## 2020-03-25 PROBLEM — I10 HTN (HYPERTENSION): Status: RESOLVED | Noted: 2017-11-17 | Resolved: 2020-03-24

## 2020-04-08 ENCOUNTER — TELEPHONE (OUTPATIENT)
Dept: SURGERY | Age: 70
End: 2020-04-08

## 2020-05-15 ENCOUNTER — PROCEDURE VISIT (OUTPATIENT)
Dept: CARDIOLOGY CLINIC | Age: 70
End: 2020-05-15
Payer: MEDICARE

## 2020-05-15 PROCEDURE — 93295 DEV INTERROG REMOTE 1/2/MLT: CPT | Performed by: INTERNAL MEDICINE

## 2020-05-15 PROCEDURE — 93296 REM INTERROG EVL PM/IDS: CPT | Performed by: INTERNAL MEDICINE

## 2020-08-12 ENCOUNTER — HOSPITAL ENCOUNTER (OUTPATIENT)
Age: 70
Setting detail: OBSERVATION
Discharge: HOME | End: 2020-08-14
Attending: EMERGENCY MEDICINE | Admitting: INTERNAL MEDICINE
Payer: MEDICARE

## 2020-08-12 ENCOUNTER — APPOINTMENT (OUTPATIENT)
Dept: GENERAL RADIOLOGY | Age: 70
End: 2020-08-12
Payer: MEDICARE

## 2020-08-12 ENCOUNTER — APPOINTMENT (OUTPATIENT)
Dept: CT IMAGING | Age: 70
End: 2020-08-12
Payer: MEDICARE

## 2020-08-12 PROBLEM — R10.13 EPIGASTRIC PAIN: Status: ACTIVE | Noted: 2020-08-12

## 2020-08-12 LAB
ALBUMIN SERPL-MCNC: 3.5 GM/DL (ref 3.4–5)
ALP BLD-CCNC: 71 IU/L (ref 40–129)
ALT SERPL-CCNC: 8 U/L (ref 10–40)
AMPHETAMINES: NEGATIVE
ANION GAP SERPL CALCULATED.3IONS-SCNC: 9 MMOL/L (ref 4–16)
AST SERPL-CCNC: 16 IU/L (ref 15–37)
BACTERIA: NEGATIVE /HPF
BARBITURATE SCREEN URINE: NEGATIVE
BASOPHILS ABSOLUTE: 0 K/CU MM
BASOPHILS RELATIVE PERCENT: 0.2 % (ref 0–1)
BENZODIAZEPINE SCREEN, URINE: NEGATIVE
BILIRUB SERPL-MCNC: 0.9 MG/DL (ref 0–1)
BILIRUBIN URINE: NEGATIVE MG/DL
BLOOD, URINE: ABNORMAL
BUN BLDV-MCNC: 13 MG/DL (ref 6–23)
CALCIUM SERPL-MCNC: 9.1 MG/DL (ref 8.3–10.6)
CANNABINOID SCREEN URINE: ABNORMAL
CHLORIDE BLD-SCNC: 98 MMOL/L (ref 99–110)
CLARITY: CLEAR
CO2: 27 MMOL/L (ref 21–32)
COCAINE METABOLITE: NEGATIVE
COLOR: YELLOW
CREAT SERPL-MCNC: 1.5 MG/DL (ref 0.9–1.3)
DIFFERENTIAL TYPE: ABNORMAL
EOSINOPHILS ABSOLUTE: 0 K/CU MM
EOSINOPHILS RELATIVE PERCENT: 0.2 % (ref 0–3)
GFR AFRICAN AMERICAN: 56 ML/MIN/1.73M2
GFR NON-AFRICAN AMERICAN: 46 ML/MIN/1.73M2
GLUCOSE BLD-MCNC: 133 MG/DL (ref 70–99)
GLUCOSE BLD-MCNC: 196 MG/DL (ref 70–99)
GLUCOSE, URINE: NEGATIVE MG/DL
HCT VFR BLD CALC: 46.1 % (ref 42–52)
HEMOGLOBIN: 15 GM/DL (ref 13.5–18)
IMMATURE NEUTROPHIL %: 0.3 % (ref 0–0.43)
KETONES, URINE: ABNORMAL MG/DL
LACTATE: 1.3 MMOL/L (ref 0.4–2)
LEUKOCYTE ESTERASE, URINE: NEGATIVE
LIPASE: 38 IU/L (ref 13–60)
LYMPHOCYTES ABSOLUTE: 1.1 K/CU MM
LYMPHOCYTES RELATIVE PERCENT: 11.2 % (ref 24–44)
MCH RBC QN AUTO: 30 PG (ref 27–31)
MCHC RBC AUTO-ENTMCNC: 32.5 % (ref 32–36)
MCV RBC AUTO: 92.2 FL (ref 78–100)
MONOCYTES ABSOLUTE: 0.5 K/CU MM
MONOCYTES RELATIVE PERCENT: 5.1 % (ref 0–4)
NITRITE URINE, QUANTITATIVE: NEGATIVE
NUCLEATED RBC %: 0 %
OPIATES, URINE: ABNORMAL
OXYCODONE: NEGATIVE
PDW BLD-RTO: 12.8 % (ref 11.7–14.9)
PH, URINE: 7 (ref 5–8)
PHENCYCLIDINE, URINE: NEGATIVE
PLATELET # BLD: 211 K/CU MM (ref 140–440)
PMV BLD AUTO: 10 FL (ref 7.5–11.1)
POTASSIUM SERPL-SCNC: 4.4 MMOL/L (ref 3.5–5.1)
PRO-BNP: 8759 PG/ML
PROTEIN UA: >500 MG/DL
RBC # BLD: 5 M/CU MM (ref 4.6–6.2)
RBC URINE: 2 /HPF (ref 0–3)
SEGMENTED NEUTROPHILS ABSOLUTE COUNT: 7.8 K/CU MM
SEGMENTED NEUTROPHILS RELATIVE PERCENT: 83 % (ref 36–66)
SODIUM BLD-SCNC: 134 MMOL/L (ref 135–145)
SPECIFIC GRAVITY UA: 1.03 (ref 1–1.03)
SPERM: ABNORMAL /HFP
SQUAMOUS EPITHELIAL: <1 /HPF
TOTAL IMMATURE NEUTOROPHIL: 0.03 K/CU MM
TOTAL NUCLEATED RBC: 0 K/CU MM
TOTAL PROTEIN: 7.4 GM/DL (ref 6.4–8.2)
TRICHOMONAS: ABNORMAL /HPF
TROPONIN T: 0.04 NG/ML
UROBILINOGEN, URINE: 1 MG/DL (ref 0.2–1)
WBC # BLD: 9.4 K/CU MM (ref 4–10.5)
WBC UA: 1 /HPF (ref 0–2)

## 2020-08-12 PROCEDURE — 6370000000 HC RX 637 (ALT 250 FOR IP): Performed by: PHYSICIAN ASSISTANT

## 2020-08-12 PROCEDURE — 96361 HYDRATE IV INFUSION ADD-ON: CPT

## 2020-08-12 PROCEDURE — 83605 ASSAY OF LACTIC ACID: CPT

## 2020-08-12 PROCEDURE — 74177 CT ABD & PELVIS W/CONTRAST: CPT

## 2020-08-12 PROCEDURE — 96374 THER/PROPH/DIAG INJ IV PUSH: CPT

## 2020-08-12 PROCEDURE — 2580000003 HC RX 258: Performed by: PHYSICIAN ASSISTANT

## 2020-08-12 PROCEDURE — 85025 COMPLETE CBC W/AUTO DIFF WBC: CPT

## 2020-08-12 PROCEDURE — 84484 ASSAY OF TROPONIN QUANT: CPT

## 2020-08-12 PROCEDURE — 96376 TX/PRO/DX INJ SAME DRUG ADON: CPT

## 2020-08-12 PROCEDURE — G0378 HOSPITAL OBSERVATION PER HR: HCPCS

## 2020-08-12 PROCEDURE — 80053 COMPREHEN METABOLIC PANEL: CPT

## 2020-08-12 PROCEDURE — 82962 GLUCOSE BLOOD TEST: CPT

## 2020-08-12 PROCEDURE — 6360000004 HC RX CONTRAST MEDICATION: Performed by: PHYSICIAN ASSISTANT

## 2020-08-12 PROCEDURE — 93005 ELECTROCARDIOGRAM TRACING: CPT | Performed by: PHYSICIAN ASSISTANT

## 2020-08-12 PROCEDURE — 83880 ASSAY OF NATRIURETIC PEPTIDE: CPT

## 2020-08-12 PROCEDURE — 6370000000 HC RX 637 (ALT 250 FOR IP): Performed by: NURSE PRACTITIONER

## 2020-08-12 PROCEDURE — 81001 URINALYSIS AUTO W/SCOPE: CPT

## 2020-08-12 PROCEDURE — 80307 DRUG TEST PRSMV CHEM ANLYZR: CPT

## 2020-08-12 PROCEDURE — 36415 COLL VENOUS BLD VENIPUNCTURE: CPT

## 2020-08-12 PROCEDURE — 71045 X-RAY EXAM CHEST 1 VIEW: CPT

## 2020-08-12 PROCEDURE — 6360000002 HC RX W HCPCS: Performed by: PHYSICIAN ASSISTANT

## 2020-08-12 PROCEDURE — 83690 ASSAY OF LIPASE: CPT

## 2020-08-12 PROCEDURE — 96375 TX/PRO/DX INJ NEW DRUG ADDON: CPT

## 2020-08-12 PROCEDURE — 99285 EMERGENCY DEPT VISIT HI MDM: CPT

## 2020-08-12 RX ORDER — MORPHINE SULFATE 4 MG/ML
4 INJECTION, SOLUTION INTRAMUSCULAR; INTRAVENOUS ONCE
Status: COMPLETED | OUTPATIENT
Start: 2020-08-12 | End: 2020-08-12

## 2020-08-12 RX ORDER — HEPARIN SODIUM 5000 [USP'U]/ML
5000 INJECTION, SOLUTION INTRAVENOUS; SUBCUTANEOUS EVERY 8 HOURS SCHEDULED
Status: DISCONTINUED | OUTPATIENT
Start: 2020-08-13 | End: 2020-08-14 | Stop reason: HOSPADM

## 2020-08-12 RX ORDER — CARVEDILOL 6.25 MG/1
6.25 TABLET ORAL 2 TIMES DAILY WITH MEALS
Status: DISCONTINUED | OUTPATIENT
Start: 2020-08-13 | End: 2020-08-14 | Stop reason: HOSPADM

## 2020-08-12 RX ORDER — METOLAZONE 5 MG/1
5 TABLET ORAL DAILY
Status: DISCONTINUED | OUTPATIENT
Start: 2020-08-13 | End: 2020-08-14 | Stop reason: HOSPADM

## 2020-08-12 RX ORDER — ONDANSETRON 4 MG/1
4 TABLET, ORALLY DISINTEGRATING ORAL EVERY 6 HOURS
Qty: 10 TABLET | Refills: 0 | Status: SHIPPED | OUTPATIENT
Start: 2020-08-12 | End: 2020-08-14 | Stop reason: HOSPADM

## 2020-08-12 RX ORDER — CLOPIDOGREL BISULFATE 75 MG/1
75 TABLET ORAL DAILY
Status: DISCONTINUED | OUTPATIENT
Start: 2020-08-13 | End: 2020-08-14 | Stop reason: HOSPADM

## 2020-08-12 RX ORDER — ONDANSETRON 2 MG/ML
4 INJECTION INTRAMUSCULAR; INTRAVENOUS EVERY 6 HOURS PRN
Status: DISCONTINUED | OUTPATIENT
Start: 2020-08-12 | End: 2020-08-14 | Stop reason: HOSPADM

## 2020-08-12 RX ORDER — CARVEDILOL 6.25 MG/1
6.25 TABLET ORAL 2 TIMES DAILY WITH MEALS
Status: DISCONTINUED | OUTPATIENT
Start: 2020-08-12 | End: 2020-08-12

## 2020-08-12 RX ORDER — POLYETHYLENE GLYCOL 3350 17 G/17G
17 POWDER, FOR SOLUTION ORAL DAILY PRN
Status: DISCONTINUED | OUTPATIENT
Start: 2020-08-12 | End: 2020-08-14 | Stop reason: HOSPADM

## 2020-08-12 RX ORDER — PROMETHAZINE HYDROCHLORIDE 25 MG/1
12.5 TABLET ORAL EVERY 6 HOURS PRN
Status: DISCONTINUED | OUTPATIENT
Start: 2020-08-12 | End: 2020-08-14 | Stop reason: HOSPADM

## 2020-08-12 RX ORDER — LOSARTAN POTASSIUM 100 MG/1
100 TABLET ORAL EVERY EVENING
Status: DISCONTINUED | OUTPATIENT
Start: 2020-08-13 | End: 2020-08-14 | Stop reason: HOSPADM

## 2020-08-12 RX ORDER — ALBUTEROL SULFATE 90 UG/1
2 AEROSOL, METERED RESPIRATORY (INHALATION) EVERY 4 HOURS PRN
Status: DISCONTINUED | OUTPATIENT
Start: 2020-08-12 | End: 2020-08-14 | Stop reason: HOSPADM

## 2020-08-12 RX ORDER — TORSEMIDE 20 MG/1
20 TABLET ORAL DAILY
Status: DISCONTINUED | OUTPATIENT
Start: 2020-08-13 | End: 2020-08-14 | Stop reason: HOSPADM

## 2020-08-12 RX ORDER — AMLODIPINE BESYLATE 5 MG/1
5 TABLET ORAL ONCE
Status: COMPLETED | OUTPATIENT
Start: 2020-08-12 | End: 2020-08-12

## 2020-08-12 RX ORDER — ACETAMINOPHEN 650 MG/1
650 SUPPOSITORY RECTAL EVERY 6 HOURS PRN
Status: DISCONTINUED | OUTPATIENT
Start: 2020-08-12 | End: 2020-08-14 | Stop reason: HOSPADM

## 2020-08-12 RX ORDER — DEXTROSE MONOHYDRATE 50 MG/ML
100 INJECTION, SOLUTION INTRAVENOUS PRN
Status: DISCONTINUED | OUTPATIENT
Start: 2020-08-12 | End: 2020-08-14 | Stop reason: HOSPADM

## 2020-08-12 RX ORDER — ACETAMINOPHEN 325 MG/1
650 TABLET ORAL EVERY 6 HOURS PRN
Status: DISCONTINUED | OUTPATIENT
Start: 2020-08-12 | End: 2020-08-14 | Stop reason: HOSPADM

## 2020-08-12 RX ORDER — GABAPENTIN 300 MG/1
600 CAPSULE ORAL 3 TIMES DAILY
Status: DISCONTINUED | OUTPATIENT
Start: 2020-08-13 | End: 2020-08-14 | Stop reason: HOSPADM

## 2020-08-12 RX ORDER — DICYCLOMINE HYDROCHLORIDE 10 MG/1
20 CAPSULE ORAL
Qty: 30 CAPSULE | Refills: 0 | Status: SHIPPED | OUTPATIENT
Start: 2020-08-12 | End: 2020-08-14 | Stop reason: HOSPADM

## 2020-08-12 RX ORDER — GUANFACINE 1 MG/1
1 TABLET ORAL NIGHTLY
Status: DISCONTINUED | OUTPATIENT
Start: 2020-08-13 | End: 2020-08-14 | Stop reason: HOSPADM

## 2020-08-12 RX ORDER — DOXAZOSIN MESYLATE 4 MG/1
2 TABLET ORAL DAILY
Status: DISCONTINUED | OUTPATIENT
Start: 2020-08-13 | End: 2020-08-14 | Stop reason: HOSPADM

## 2020-08-12 RX ORDER — NICOTINE POLACRILEX 4 MG
15 LOZENGE BUCCAL PRN
Status: DISCONTINUED | OUTPATIENT
Start: 2020-08-12 | End: 2020-08-14 | Stop reason: HOSPADM

## 2020-08-12 RX ORDER — ONDANSETRON 2 MG/ML
4 INJECTION INTRAMUSCULAR; INTRAVENOUS ONCE
Status: COMPLETED | OUTPATIENT
Start: 2020-08-12 | End: 2020-08-12

## 2020-08-12 RX ORDER — SODIUM CHLORIDE 0.9 % (FLUSH) 0.9 %
10 SYRINGE (ML) INJECTION PRN
Status: DISCONTINUED | OUTPATIENT
Start: 2020-08-12 | End: 2020-08-14 | Stop reason: HOSPADM

## 2020-08-12 RX ORDER — DEXTROSE MONOHYDRATE 25 G/50ML
12.5 INJECTION, SOLUTION INTRAVENOUS PRN
Status: DISCONTINUED | OUTPATIENT
Start: 2020-08-12 | End: 2020-08-14 | Stop reason: HOSPADM

## 2020-08-12 RX ORDER — 0.9 % SODIUM CHLORIDE 0.9 %
1000 INTRAVENOUS SOLUTION INTRAVENOUS ONCE
Status: COMPLETED | OUTPATIENT
Start: 2020-08-12 | End: 2020-08-12

## 2020-08-12 RX ORDER — AMLODIPINE BESYLATE 10 MG/1
10 TABLET ORAL DAILY
Status: DISCONTINUED | OUTPATIENT
Start: 2020-08-13 | End: 2020-08-14 | Stop reason: HOSPADM

## 2020-08-12 RX ORDER — ASPIRIN 81 MG/1
324 TABLET, CHEWABLE ORAL ONCE
Status: COMPLETED | OUTPATIENT
Start: 2020-08-12 | End: 2020-08-12

## 2020-08-12 RX ORDER — SODIUM CHLORIDE 0.9 % (FLUSH) 0.9 %
10 SYRINGE (ML) INJECTION EVERY 12 HOURS SCHEDULED
Status: DISCONTINUED | OUTPATIENT
Start: 2020-08-13 | End: 2020-08-14 | Stop reason: HOSPADM

## 2020-08-12 RX ADMIN — SODIUM CHLORIDE, PRESERVATIVE FREE 10 ML: 5 INJECTION INTRAVENOUS at 14:33

## 2020-08-12 RX ADMIN — SODIUM CHLORIDE 1000 ML: 9 INJECTION, SOLUTION INTRAVENOUS at 13:54

## 2020-08-12 RX ADMIN — ONDANSETRON 4 MG: 2 INJECTION INTRAMUSCULAR; INTRAVENOUS at 13:54

## 2020-08-12 RX ADMIN — MORPHINE SULFATE 4 MG: 4 INJECTION, SOLUTION INTRAMUSCULAR; INTRAVENOUS at 13:55

## 2020-08-12 RX ADMIN — ASPIRIN 81 MG CHEWABLE TABLET 324 MG: 81 TABLET CHEWABLE at 19:49

## 2020-08-12 RX ADMIN — MORPHINE SULFATE 4 MG: 4 INJECTION, SOLUTION INTRAMUSCULAR; INTRAVENOUS at 18:14

## 2020-08-12 RX ADMIN — AMLODIPINE BESYLATE 5 MG: 5 TABLET ORAL at 18:14

## 2020-08-12 RX ADMIN — CARVEDILOL 6.25 MG: 6.25 TABLET, FILM COATED ORAL at 18:27

## 2020-08-12 RX ADMIN — IOPAMIDOL 80 ML: 755 INJECTION, SOLUTION INTRAVENOUS at 14:33

## 2020-08-12 ASSESSMENT — PAIN DESCRIPTION - PAIN TYPE
TYPE: ACUTE PAIN
TYPE: ACUTE PAIN

## 2020-08-12 ASSESSMENT — PAIN SCALES - GENERAL
PAINLEVEL_OUTOF10: 8
PAINLEVEL_OUTOF10: 5
PAINLEVEL_OUTOF10: 5
PAINLEVEL_OUTOF10: 2

## 2020-08-12 ASSESSMENT — PAIN DESCRIPTION - DESCRIPTORS
DESCRIPTORS: CONSTANT;DULL
DESCRIPTORS: CONSTANT;DULL

## 2020-08-12 ASSESSMENT — PAIN DESCRIPTION - ORIENTATION: ORIENTATION: LEFT

## 2020-08-12 ASSESSMENT — PAIN DESCRIPTION - LOCATION
LOCATION: ABDOMEN
LOCATION: ABDOMEN

## 2020-08-12 NOTE — ED PROVIDER NOTES
eMERGENCY dEPARTMENT eNCOUnter      PCP: [id number]    Chief Complaint   Patient presents with    Emesis    Nausea    Diarrhea       HPI    Sushma Truong is a 71 y.o. male past medical history of diabetes mellitus eczema hypertension, hyperlipidemia who presents with abdominal pain, nausea, vomiting and diarrhea. He states symptoms began over the past couple of days with pain with significant epigastric and right upper quadrant of abdomen. He endorses several episodes of nonbloody, nonbilious emesis as well as nonbloody diarrhea. No radiation of pain. No associated chest pain or shortness of breath. No urinary symptoms. No fevers. He denies recent sick contact with similar symptoms. He states his blood sugars have been running normal for him that he has been compliant with medications. He is unsure of previous abdominal surgeries. REVIEW OF SYSTEMS    Constitutional:  Denies fever, chills, weight loss or weakness   HENT:  Denies sore throat or ear pain   Cardiovascular:  Denies chest pain, palpitations or swelling   Respiratory:  Denies cough or shortness of breath   GI:  See HPI above  : No hematuria or dysuria. Musculoskeletal:  Denies back pain or groin pain or masses. No pain or swelling of extremities.   Skin:  Denies rash  Neurologic:  Denies headache, focal weakness or sensory changes   Endocrine:  Denies polyuria or polydypsia   Lymphatic:  Denies swollen glands     All other review of systems are negative  See HPI and nursing notes for additional information     PAST MEDICAL & SURGICAL HISTORY    Past Medical History:   Diagnosis Date    Acid reflux     Acute MI ([facility] [address] 2004, 2008    Arthritis     Back    Broken teeth     Upper Front    CAD (coronary artery disease)     Sees Dr. True Nobles [facility])     per old chart    CHF (congestive heart failure) (HCC)     Chronic back pain     Chronic kidney disease     STAGE 3 KIDNEY FAILURE- \"from my diabetes- do not follow with any one- have seen Dr Zina Okeefe in the past\"    Diabetes mellitus ([facility] [address] Dx 1965    per old chart pt has been diabetic since age 13    Diabetic neuropathy ([facility] [address]     \"in my feet\"    H/O cardiovascular stress test 08/25/2016    H/O Doppler ultrasound 09/27/2018    Moderate disease of the right lower extremity with an JALEN of 0.72.   Moderate to severe disease of the left lower extremity with an JALEN of 0.55.   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    Neuropathy     both feet    neuropathy     PAD (peripheral artery disease) ([facility] [address] 09/27/2018    PVD (peripheral vascular disease) ([facility] [address]     Sick sinus syndrome ([address]     Sleep apnea     \"sleep study 3 yrs ago- could not tolerate the cpap made me too dry\"    Spinal stenosis     Teeth missing     Upper And Lower    Type 2 diabetes mellitus without complication [facility])      Past Surgical History:   Procedure Laterality Date    CARDIAC CATHETERIZATION      per old chart done 10/2014    CARDIAC CATHETERIZATION  07/14/2017    with angiography of leg    CARDIAC CATHETERIZATION  11/20/2018    PATENT STENTS OF ALL THREE MAJOR VESSELS    CARDIAC DEFIBRILLATOR PLACEMENT  06/04/2010    [employer] [address] Defibrillator Implanted    COLECTOMY Right 08/26/2016    laparascopic; robotic assisted    COLONOSCOPY  08/04/2016    CORONARY ANGIOPLASTY      \"15 Heart Stents\"    CORONARY ANGIOPLASTY WITH STENT PLACEMENT      per old chart had angio with stent to circumflex and obtuse marginal artery at [facility] 5/2010( old chart also gives hx of stent placement done [id number],[id number] and 2005)    DENTAL SURGERY      Teeth Extracted In Past    PACEMAKER PLACEMENT  06/04/2010    [employer] [address] Defibrillator Implanted    TOE AMPUTATION Right 09/12/2017    Rt 3rd toe    TOE AMPUTATION Right 01/09/2018     Right 5th toe amputation and Toenails trimmed left 2,3,4 and 5th toes    VASCULAR SURGERY      per old chart had balloon angioplasty right superfical femoral artery,right popliteal artery,,right ant.tibial artery, right tibioperoneal trunk, and right post.tibial artery wna stent placement right popliteal artery and superfical femoral artery 7/2012       CURRENT MEDICATIONS    Current Outpatient Rx   Medication Sig Dispense Refill    dicyclomine (BENTYL) 10 MG capsule Take 2 capsules by mouth 4 times daily (before meals and nightly) 30 capsule 0    ondansetron (ZOFRAN ODT) 4 MG disintegrating tablet Take 1 tablet by mouth every 6 hours 10 tablet 0    guanFACINE (TENEX) 1 MG tablet TAKE 1 TABLET BY MOUTH EVERY DAY EVERY NIGHT 90 tablet 1    doxazosin (CARDURA) 2 MG tablet TAKE 1 TABLET BY MOUTH EVERY DAY IN THE EVENING 90 tablet 1    torsemide (DEMADEX) 20 MG tablet Take 1 tablet by mouth daily 90 tablet 3    metOLazone (ZAROXOLYN) 5 MG tablet TAKE 1 TABLET BY MOUTH EVERY DAY 90 tablet 1    Calcium Alginate (ALGICELL CALCIUM DRESSING 4\"X8) MISC Apply 1 Device topically daily 30 each 3    PROMOGRAN COREY WOUND DRESSING MATRIX Apply topically Apply to left daily and cover with gauze 1 Package 3    cephALEXin (KEFLEX) 500 MG capsule Take 1 capsule by mouth 3 times daily 30 capsule 0    amLODIPine (NORVASC) 10 MG tablet Take 10 mg by mouth daily      TRULICITY 1.27 CV/9.4IC SOPN       BD ULTRA-FINE PEN NEEDLES 29G X 12.7MM MISC       VENTOLIN  (90 Base) MCG/ACT inhaler       carvedilol (COREG) 25 MG tablet TAKE 1 TABLET BY MOUTH TWICE A DAY WITH MEALS 180 tablet 3    losartan (COZAAR) 100 MG tablet Take 1 tablet by mouth every evening 90 tablet 3    Blood Pressure KIT BID for record down in log book 1 kit 0    clopidogrel (PLAVIX) 75 MG tablet Take 1 tablet by mouth daily 30 tablet 11    gabapentin (NEURONTIN) 600 MG tablet Take 600 mg by mouth 3 times daily.  insulin glargine (LANTUS SOLOSTAR) 100 UNIT/ML injection pen Inject 40 Units into the skin nightly (Patient taking differently: Inject 45 Units into the skin nightly ) 5 Pen 3       ALLERGIES    Allergies   Allergen Reactions    Pcn [Penicillins] Hives    Fentanyl Itching       SOCIAL AND FAMILY HISTORY    Social History     Socioeconomic History    Marital status:       Spouse name: None    Number of children: None    Years of education: None    Highest education level: None   Occupational History    None   Social Needs    Financial resource strain: None    Food insecurity     Worry: None     Inability: None    Transportation needs     Medical: None     Non-medical: None   Tobacco Use    Smoking status: Current Some Day Smoker     Packs/day: 0.00     Years: 36.00     Pack years: 0.00     Types: Cigars     Start date: 1/1/1980    Smokeless tobacco: Never Used   Substance and Sexual Activity    Alcohol use: No     Frequency: Never    Drug use: Yes     Types: Marijuana    Sexual activity: Never   Lifestyle    Physical activity     Days per week: None     Minutes per session: None    Stress: None   Relationships    Social connections     Talks on phone: None     Gets together: None     Attends [employer] service: None     Active member of club or organization: None     Attends meetings of clubs or organizations: None     Relationship status: None    Intimate partner violence     Fear of current or ex partner: None     Emotionally abused: None     Physically abused: None     Forced sexual activity: None   Other Topics Concern    None   Social History Narrative    None     Family History   Problem Relation Age of Onset    Diabetes Mother     Stroke Mother     High Blood Pressure Mother     Vision Loss Mother     Cancer Father         Prostate Cancer    Diabetes Sister     Neuropathy Sister     Other Sister         \"Breathing Problems\"    Heart Disease Sister     Early Death Sister 62        Heart Complications    Cancer Brother         \"Stomach Cancer\"    High Blood Pressure Brother     Diabetes Brother     Heart Disease Brother     High Blood Pressure Brother     Cancer Son         \"Testicle Cancer\"       PHYSICAL EXAM    VITAL SIGNS: BP (!) 175/75   Pulse 64   Temp 98.5 °F (36.9 °C) (Oral)   Resp 13   Ht 6' (1.829 m)   SpO2 95%   BMI 34.04 kg/m²   Constitutional:  Well developed, well nourished. No distress  Eyes:  Sclera nonicteric, conjunctiva moist  HENT:  Atraumatic. PERRL. EOMI. Moist mucus membranes. Neck/Lymphatics: supple, no JVD, no swollen nodes  Respiratory:  No retractions, no accessory muscle use, normal breath sounds   Cardiovascular:   normal rate, normal rhythm, no murmurs    GI:     No gross discoloration. Bowel sounds present, no audible bruits. Soft,  no distention, no guarding, no rigidity,   + epigastric and RUQ abdominal tenderness, no rebound tenderness, no palpable pulsatile masses  Back:   No CVA tenderness to percussion. Musculoskeletal:  No edema, no deformity  Vascular: DP pulses 2+ equal bilaterally  Integument: No rash, dry skin  Neurologic:    - Alert & oriented person, place, time, and situation, no speech difficulties or slurring.  - No obvious gross motor deficits  - Cranial nerves 2-12 grossly intact  - Negative meningeal signs.  - Sensation intact to light touch  - Strength 5/5 in upper and lower extremities bilaterally  - Normal finger to nose test bilaterally  - Rapid alternating movements intact  - Normal heel-shin bilaterally  - No pronator drift. - Light touch sensation intact throughout. - Upper and lower extremity DTRs 2+ bilaterally.   - Gait steady and without difficulty    Psychiatric: Cooperative, pleasant affect LABS:       Urinalysis (Final result)   Component (Lab Inquiry)   Collection Time  Result Time  COLOR U  CLARITY U  Glucose, Urine  BILI UR  KETONES U  SPEC GRAV  BLOOD U  pH, Urine  Protein, UA  Urobilinogen, Urine    08/12/20 17:15:00  08/12/20 18:31:13  YELLOW  CLEAR  NEGATIVE  NEGATIVE  MODERATE 1.032   (NOTE)   CONSIDER URINE. ..   SMALL 7.0  >500 1    Previous Results    09/06/19 09:00:00  09/06/19 12:33:07  YELLOW  CLEAR  NEGATIVE  NEGATIVE  NEGATIVE  1.011  NEGATIVE  6.0  30 1    03/12/19 11:15:00  03/12/19 13:16:28  COLORLESS CLEAR  NEGATIVE  NEGATIVE  NEGATIVE  1.005  NEGATIVE  8.0  NEGATIVE  1    01/03/19 10:59:00  01/03/19 11:54:00  YELLOW  CLEAR  150 NEGATIVE  NEGATIVE  1.011  NEGATIVE  5.0  100 NORMAL    11/19/18 16:50:00  11/19/18 17:02:00  YELLOW  CLEAR  NEGATIVE  NEGATIVE  NEGATIVE  1.009  NEGATIVE  5.0  NEGATIVE  NORMAL    01/11/18 14:40:00  01/11/18 16:44:00  COLORLESS CLEAR  NEGATIVE  NEGATIVE  NEGATIVE  1.005  SMALL 5.0  NEGATIVE  NORMAL           Collection Time  Result Time  Nitrite Urine, Quantitative  LEUKOCYTES, UR  RBC U  WBC UA  BACTERIA  Squam Epithel, UA  SPERM  Trichomonas, UA  MUCUS    08/12/20 17:15:00  08/12/20 18:31:13  NEGATIVE  NEGATIVE  2  1  NEGATIVE  <1  RARE  NONE SEEN     Previous Results    09/06/19 09:00:00  09/06/19 12:33:07  NEGATIVE  TRACE 1  2  RARE 2   NONE SEEN     03/12/19 11:15:00  03/12/19 13:16:28  NEGATIVE  NEGATIVE  1  <1  NEGATIVE    NONE SEEN  RARE   01/03/19 10:59:00  01/03/19 11:54:00  NEGATIVE  TRACE 3  5 NEGATIVE  2   NONE SEEN  RARE   11/19/18 16:50:00  11/19/18 17:02:00  NEGATIVE  NEGATIVE  1  <1  NEGATIVE  <1   NONE SEEN     01/11/18 14:40:00  01/11/18 16:44:00  NEGATIVE  NEGATIVE  1  <1  RARE <1   NONE SEEN  RARE          Final result                  Drug screen multi urine (Final result)   Component (Lab Inquiry)   Collection Time  Result Time  Cannabinoid Scrn, Ur  Amphetamines  Cocaine Metabolite  Benzodiazepine Screen, Urine  Barbiturate Screen, Ur  Opiates, Urine  Phencyclidine, Urine  Oxycodone  Amphetamine Screen, Urine  Barbiturate Screen, Urine    08/12/20 17:15:00  08/12/20 21:53:52  UNCONFIRMED POSITIVE NEGATIVE  NEGATIVE  NEGATIVE  NEGATIVE  UNCONFIRMED POSITIVE NEGATIVE  NEGATIVE     THRESHOLD CONC. .. Previous Results    07/18/18 16:14:00  07/18/18 16:53:00  UNCONFIRMED POSITIVE NEGATIVE  NEGATIVE  NEGATIVE  NEGATIVE  NEGATIVE  NEGATIVE  NEGATIVE      08/19/16 09:38:00  08/19/16 18:39:00  POSITIVE   Neg  Neg     Neg     08/19/16 09:38:00  08/19/16 09:39:00     neg      negBUP neg  neg           Collection Time  Result Time  THC  MDMA  Oxycodone Screen, Ur  Propoxyphene Screen, Urine  Tricyclic Antidepressants, Urine  Methamphetamine, Urine  Cocaine Metabolite Screen, Urine  Opiate Scrn, Ur  PCP Screen, Urine  Methadone Screen, Urine    08/12/20 17:15:00  08/12/20 21:53:52              Previous Results    07/18/18 16:14:00  07/18/18 16:53:00              08/19/16 09:38:00  08/19/16 18:39:00        Neg  Neg\"Therapeutic levels of. .. Neg  Neg    08/19/16 09:38:00  08/19/16 09:39:00  positive  neg  neg    neg  neg  neg  neg  neg           Collection Time  Result Time  Propoxyphene Scrn, Ur  PH, UR  Drug Screen Comment:  Oxycodone Urine    08/12/20 17:15:00  08/12/20 21:53:52        Previous Results    07/18/18 16:14:00  07/18/18 16:53:00        08/19/16 09:38:00  08/19/16 18:39:00  Neg  5.0Urine pH less than 5.0. Humberto Walker see belowThis method is a scree. ..   Neg    08/19/16 09:38:00  08/19/16 09:39:00               Final result                  CBC Auto Differential (Edited Result - FINAL)   Component (Lab Inquiry)   Collection Time  Result Time  WBC  RBC  HGB  HCT  MCV  MCH  MCHC  RDW  PLT  MPV    08/12/20 13:14:00  08/12/20 13:21:51  9.4  5.00  15.0  46.1  92.2  30.0  32.5  12.8  211  10.0    Previous Results    07/25/19 16:19:00  07/25/19 16:34:28  7.1  4.13 12.5 39.8 96.4  30.3  31.4 12.5  188  9.7    03/12/19 04:15:00  03/12/19 04:50:25  7.4 09/05/19 17:24:24  137  4.7  98 29  28 1.7 218 9.2      07/25/19 16:19:00  07/25/19 16:52:18  139  4.6  101  32  18  1.6 134 9.2  3.7  7.1    04/30/19 12:52:00  04/30/19 14:42:43  138  5.2 103  26  12  1.5 227 8.4      03/12/19 04:15:00  03/12/19 05:27:08  140  3.9  106  28  13  1.4 88  8.1            Collection Time  Result Time  BILI TOTAL  ALT  AST  ALK PHOS  GFR Non-  GFR   ANION GAP    08/12/20 13:14:00  08/12/20 13:53:16  0.9  8 16  71  46 56 9    Previous Results    02/20/20 15:00:00  02/20/20 16:29:28      50 >60  12    09/05/19 15:21:00  09/05/19 17:24:24      40 49 10    07/25/19 16:19:00  07/25/19 16:52:18  0.3  6 10 84  43 52 6    04/30/19 12:52:00  04/30/19 14:42:43      47 56 9    03/12/19 04:15:00  03/12/19 05:27:08      50 >60  6           Final result                  Lipase (Final result)   Component (Lab Inquiry)   Collection Time  Result Time  LIPASE    08/12/20 13:14:00  08/12/20 13:53:16  38    Previous Results    10/14/14 17:44:00  10/14/14 18:18:00  53    09/17/12 04:10:00  09/17/12 05:37:31  26    09/16/12 06:44:00  09/16/12 07:39:28  23    09/14/12 22:30:00  09/15/12 02:08:11  210   09/08/11 18:02:00  09/08/11 19:47:51  47           Final result                  Lactic Acid, Plasma (Final result)   Component (Lab Inquiry)   Collection Time  Result Time  LACTATE    08/12/20 13:14:00  08/12/20 13:52:44  1.3           Final result                  Troponin (Final result)   Component (Lab Inquiry)   Collection Time  Result Time  TROP T    08/12/20 13:14:00  08/12/20 18:11:32  0.037   (NOTE)   PATIENTS WITH . Greg César Greg César Previous Results    03/12/19 11:02:00  03/12/19 12:51:55  0.031(NOTE)   PATIENTS WITH . Greg César .   03/12/19 04:15:00  03/12/19 05:26:55  0.027(NOTE)   PATIENTS WITH . Greg César .   03/11/19 22:52:00  03/11/19 23:54:46  0.018(NOTE)   PATIENTS WITH . Greg César .   03/11/19 14:40:00  03/11/19 15:18:13  0.012(NOTE)   PATIENTS WITH . ..   11/20/18 02:39:00  11/20/18 03:45:00  0.018 Final result                  Brain Natriuretic Peptide (Final result)   Component (Lab Inquiry)   Collection Time  Result Time  Pro-BNP    08/12/20 13:14:00  08/12/20 20:18:08  8,759   (NOTE)   WE HAVE CONVER. .. Previous Results    03/12/19 04:15:00  03/12/19 05:27:08  580. 9(NOTE)   WE HAVE CONVER. ..   03/11/19 14:42:00  03/11/19 16:35:07  290. 3(NOTE)   WE HAVE CONVER. ..    11/13/18 12:36:00  11/13/18 14:59:00  394.2   07/17/18 14:12:00  07/17/18 14:47:00  296.3    01/10/16 13:45:00  01/10/16 14:31:00  585.8         RADIOLOGY/PROCEDURES    XR CHEST PORTABLE   Final Result   Increasing airspace opacity in the right lung favoring edema and is   asymmetric likely related to patient rotation. CT ABDOMEN PELVIS W IV CONTRAST   Final Result   No acute intra-abdominal or pelvic process seen. ED COURSE & MEDICAL DECISION MAKING       Vital signs and nursing notes reviewed during ED course. Patient care and presentation staffed with supervising MD.   Patient seen by supervising MD today- see his/her note for details of the encounter. Patient presents as above with abdominal pain, nausea, vomiting and diarrhea. He is markedly hypertensive on arrival with blood pressure of 201/90, does have history of hypertension. Abdomen nonsurgical.  He is given fluids, morphine, Zofran. Labs and imaging of abdomen obtained. Lab work significant for elevated troponin at 0.037, this is more elevated than most recent values with what appears to be his baseline kidney function. EKG is interpreted by supervising physician without acute changes from previous. Chest x-ray with increasing airspace opacity in the right lung favoring edema and is asymmetric, likely related to patient rotation. .  CT abdomen and pelvis without acute abnormality. His pacemaker is interrogated.    During ED stay, patient's daughter does call the department and states that patient seemed to be confused this morning about 830 she was concerned that he was dehydrated due to the recent nausea, vomiting and diarrhea. She was denying any difficulty with speech or slurred speech. No obvious weakness. Full neuro exam obtained and is negative, NIH score of 0. He is able to answer all questions appropriately for me including date, place, president. Will add on head CT here in the ED and we discussed that we will plan on admitting him for further evaluation of his elevated troponin. No stroke alert is called with NIH score of 0 on my evaluation. Patient is sitting down to get head CT completed on a couple occasions and he is refusing to get onto the table. He denies any worsening pain, states \"I do not need my brain evaluated. \"  I discussed our concern given his daughters history for intracranial abnormality and this is reiterated by the supervising physician, Dr. Husam Childers, however patient is still refusing. As he has no cognitive deficits on neuro exam, will honor his wishes, I discussed that we are waiting to exclude anything abnormal going on inside of his head is understanding of this. Patient case discussed with hospitalist, Alvaro Smith agrees with further evaluation treatment with elevated troponin, nausea, vomiting. At this time, lower suspicion for other emergent process including PE as he is not tachycardic, tachypneic or hypoxic throughout ED stay. Clinical  IMPRESSION    1. Nausea vomiting and diarrhea    2. Abdominal pain, unspecified abdominal location    3. Elevated troponin        Admission    Comment: Please note this report has been produced using speech recognition software and may contain errors related to that system including errors in grammar, punctuation, and spelling, as well as words and phrases that may be inappropriate. If there are any questions or concerns please feel free to contact the dictating provider for clarification.         WARD Fernandes  08/12/20 [id number]

## 2020-08-12 NOTE — ED PROVIDER NOTES
.EKG Interpretation    EKG performed on 8/12/2020 at 5:12 PM    Interpreted by emergency department physician    Rhythm: normal sinus  and paced  Rate: normal  Axis: normal  Ectopy: premature atrial contraction, motion artifact  Conduction: normal  ST Segments: no acute change  T Waves: inversion in  II, III and aVf  Q Waves: nonspecific    Clinical Impression: Sinus rhythm nonspecific T wave changes. EKG is compared to previous EKG performed on 7/25/2019 and is essentially stable.     Koffi Parks 27, DO  08/12/20 0104

## 2020-08-13 ENCOUNTER — APPOINTMENT (OUTPATIENT)
Dept: NUCLEAR MEDICINE | Age: 70
End: 2020-08-13
Payer: MEDICARE

## 2020-08-13 LAB
ANION GAP SERPL CALCULATED.3IONS-SCNC: 8 MMOL/L (ref 4–16)
BASOPHILS ABSOLUTE: 0 K/CU MM
BASOPHILS RELATIVE PERCENT: 0.3 % (ref 0–1)
BUN BLDV-MCNC: 15 MG/DL (ref 6–23)
CALCIUM SERPL-MCNC: 8.3 MG/DL (ref 8.3–10.6)
CHLORIDE BLD-SCNC: 105 MMOL/L (ref 99–110)
CO2: 26 MMOL/L (ref 21–32)
CREAT SERPL-MCNC: 1.5 MG/DL (ref 0.9–1.3)
DIFFERENTIAL TYPE: ABNORMAL
EKG ATRIAL RATE: 61 BPM
EKG ATRIAL RATE: 68 BPM
EKG DIAGNOSIS: NORMAL
EKG DIAGNOSIS: NORMAL
EKG P AXIS: -22 DEGREES
EKG P AXIS: 53 DEGREES
EKG P-R INTERVAL: 110 MS
EKG P-R INTERVAL: 166 MS
EKG Q-T INTERVAL: 452 MS
EKG Q-T INTERVAL: 466 MS
EKG QRS DURATION: 100 MS
EKG QRS DURATION: 96 MS
EKG QTC CALCULATION (BAZETT): 455 MS
EKG QTC CALCULATION (BAZETT): 495 MS
EKG R AXIS: 161 DEGREES
EKG R AXIS: 25 DEGREES
EKG T AXIS: 215 DEGREES
EKG T AXIS: 267 DEGREES
EKG VENTRICULAR RATE: 61 BPM
EKG VENTRICULAR RATE: 68 BPM
EOSINOPHILS ABSOLUTE: 0.1 K/CU MM
EOSINOPHILS RELATIVE PERCENT: 1.5 % (ref 0–3)
GFR AFRICAN AMERICAN: 56 ML/MIN/1.73M2
GFR NON-AFRICAN AMERICAN: 46 ML/MIN/1.73M2
GLUCOSE BLD-MCNC: 132 MG/DL (ref 70–99)
GLUCOSE BLD-MCNC: 136 MG/DL (ref 70–99)
GLUCOSE BLD-MCNC: 184 MG/DL (ref 70–99)
HCT VFR BLD CALC: 42.4 % (ref 42–52)
HEMOGLOBIN: 13.7 GM/DL (ref 13.5–18)
IMMATURE NEUTROPHIL %: 0.1 % (ref 0–0.43)
LV EF: 43 %
LV EF: 48 %
LVEF MODALITY: NORMAL
LVEF MODALITY: NORMAL
LYMPHOCYTES ABSOLUTE: 1.4 K/CU MM
LYMPHOCYTES RELATIVE PERCENT: 18.2 % (ref 24–44)
MCH RBC QN AUTO: 29.8 PG (ref 27–31)
MCHC RBC AUTO-ENTMCNC: 32.3 % (ref 32–36)
MCV RBC AUTO: 92.4 FL (ref 78–100)
MONOCYTES ABSOLUTE: 0.5 K/CU MM
MONOCYTES RELATIVE PERCENT: 6.5 % (ref 0–4)
NUCLEATED RBC %: 0 %
PDW BLD-RTO: 12.9 % (ref 11.7–14.9)
PLATELET # BLD: 169 K/CU MM (ref 140–440)
PMV BLD AUTO: 9.9 FL (ref 7.5–11.1)
POTASSIUM SERPL-SCNC: 4.5 MMOL/L (ref 3.5–5.1)
RBC # BLD: 4.59 M/CU MM (ref 4.6–6.2)
SEGMENTED NEUTROPHILS ABSOLUTE COUNT: 5.5 K/CU MM
SEGMENTED NEUTROPHILS RELATIVE PERCENT: 73.4 % (ref 36–66)
SODIUM BLD-SCNC: 139 MMOL/L (ref 135–145)
TOTAL IMMATURE NEUTOROPHIL: 0.01 K/CU MM
TOTAL NUCLEATED RBC: 0 K/CU MM
TROPONIN T: 0.04 NG/ML
TROPONIN T: 0.04 NG/ML
WBC # BLD: 7.5 K/CU MM (ref 4–10.5)

## 2020-08-13 PROCEDURE — 80048 BASIC METABOLIC PNL TOTAL CA: CPT

## 2020-08-13 PROCEDURE — G0378 HOSPITAL OBSERVATION PER HR: HCPCS

## 2020-08-13 PROCEDURE — 84484 ASSAY OF TROPONIN QUANT: CPT

## 2020-08-13 PROCEDURE — 6370000000 HC RX 637 (ALT 250 FOR IP): Performed by: INTERNAL MEDICINE

## 2020-08-13 PROCEDURE — 6370000000 HC RX 637 (ALT 250 FOR IP): Performed by: NURSE PRACTITIONER

## 2020-08-13 PROCEDURE — 93017 CV STRESS TEST TRACING ONLY: CPT

## 2020-08-13 PROCEDURE — 93306 TTE W/DOPPLER COMPLETE: CPT

## 2020-08-13 PROCEDURE — 99214 OFFICE O/P EST MOD 30 MIN: CPT | Performed by: INTERNAL MEDICINE

## 2020-08-13 PROCEDURE — 96365 THER/PROPH/DIAG IV INF INIT: CPT

## 2020-08-13 PROCEDURE — 96372 THER/PROPH/DIAG INJ SC/IM: CPT

## 2020-08-13 PROCEDURE — 78452 HT MUSCLE IMAGE SPECT MULT: CPT

## 2020-08-13 PROCEDURE — 82962 GLUCOSE BLOOD TEST: CPT

## 2020-08-13 PROCEDURE — 6360000002 HC RX W HCPCS: Performed by: NURSE PRACTITIONER

## 2020-08-13 PROCEDURE — 6360000002 HC RX W HCPCS: Performed by: INTERNAL MEDICINE

## 2020-08-13 PROCEDURE — 85025 COMPLETE CBC W/AUTO DIFF WBC: CPT

## 2020-08-13 PROCEDURE — 94761 N-INVAS EAR/PLS OXIMETRY MLT: CPT

## 2020-08-13 PROCEDURE — 93010 ELECTROCARDIOGRAM REPORT: CPT | Performed by: INTERNAL MEDICINE

## 2020-08-13 PROCEDURE — 36415 COLL VENOUS BLD VENIPUNCTURE: CPT

## 2020-08-13 PROCEDURE — A9500 TC99M SESTAMIBI: HCPCS | Performed by: INTERNAL MEDICINE

## 2020-08-13 PROCEDURE — 3430000000 HC RX DIAGNOSTIC RADIOPHARMACEUTICAL: Performed by: INTERNAL MEDICINE

## 2020-08-13 PROCEDURE — 2580000003 HC RX 258: Performed by: NURSE PRACTITIONER

## 2020-08-13 RX ORDER — ATORVASTATIN CALCIUM 40 MG/1
40 TABLET, FILM COATED ORAL NIGHTLY
Status: DISCONTINUED | OUTPATIENT
Start: 2020-08-13 | End: 2020-08-14 | Stop reason: HOSPADM

## 2020-08-13 RX ORDER — AMINOPHYLLINE DIHYDRATE 25 MG/ML
75 INJECTION, SOLUTION INTRAVENOUS ONCE
Status: COMPLETED | OUTPATIENT
Start: 2020-08-13 | End: 2020-08-13

## 2020-08-13 RX ADMIN — REGADENOSON 0.4 MG: 0.08 INJECTION, SOLUTION INTRAVENOUS at 12:17

## 2020-08-13 RX ADMIN — DOXAZOSIN 2 MG: 4 TABLET ORAL at 12:55

## 2020-08-13 RX ADMIN — GUANFACINE HYDROCHLORIDE 1 MG: 1 TABLET ORAL at 20:28

## 2020-08-13 RX ADMIN — Medication 10 MILLICURIE: at 11:05

## 2020-08-13 RX ADMIN — METOLAZONE 5 MG: 5 TABLET ORAL at 12:53

## 2020-08-13 RX ADMIN — LOSARTAN POTASSIUM 100 MG: 100 TABLET, FILM COATED ORAL at 18:35

## 2020-08-13 RX ADMIN — AMINOPHYLLINE 75 MG: 25 INJECTION, SOLUTION INTRAVENOUS at 12:22

## 2020-08-13 RX ADMIN — HEPARIN SODIUM 5000 UNITS: 5000 INJECTION, SOLUTION INTRAVENOUS; SUBCUTANEOUS at 21:31

## 2020-08-13 RX ADMIN — AMLODIPINE BESYLATE 10 MG: 10 TABLET ORAL at 12:54

## 2020-08-13 RX ADMIN — HEPARIN SODIUM 5000 UNITS: 5000 INJECTION, SOLUTION INTRAVENOUS; SUBCUTANEOUS at 15:48

## 2020-08-13 RX ADMIN — SODIUM CHLORIDE, PRESERVATIVE FREE 10 ML: 5 INJECTION INTRAVENOUS at 20:29

## 2020-08-13 RX ADMIN — ATORVASTATIN CALCIUM 40 MG: 40 TABLET, FILM COATED ORAL at 20:28

## 2020-08-13 RX ADMIN — CARVEDILOL 6.25 MG: 6.25 TABLET, FILM COATED ORAL at 12:56

## 2020-08-13 RX ADMIN — Medication 30 MILLICURIE: at 12:20

## 2020-08-13 RX ADMIN — CARVEDILOL 6.25 MG: 6.25 TABLET, FILM COATED ORAL at 18:35

## 2020-08-13 RX ADMIN — TORSEMIDE 20 MG: 20 TABLET ORAL at 12:57

## 2020-08-13 RX ADMIN — GABAPENTIN 600 MG: 300 CAPSULE ORAL at 12:58

## 2020-08-13 RX ADMIN — GABAPENTIN 600 MG: 300 CAPSULE ORAL at 20:28

## 2020-08-13 RX ADMIN — CLOPIDOGREL BISULFATE 75 MG: 75 TABLET ORAL at 12:56

## 2020-08-13 RX ADMIN — HYDRALAZINE HYDROCHLORIDE 10 MG: 20 INJECTION INTRAMUSCULAR; INTRAVENOUS at 00:41

## 2020-08-13 NOTE — CARE COORDINATION
Met c pt's dghtr to initiate discharge planning as pt was off the floor. Pt lives at home and dghtr lives with him and provides 24/7 care and supervision. She is pt's paid caregiver through [employer] program.  Pt currently does not have any skilled providers in the home and dghtr did not feel needed at this time. Pt has all needed equipment including adjustable bed, wheelchair, olivares round, BSC, shower chair (walk in shower), life alert and a daily home delivered meal.  Pt does not drive, dghtr provides transport. Pt has pcp/ active insurance and can afford meds. At this time pt denied needs, advised CM available if needs arise.

## 2020-08-13 NOTE — ED NOTES
Patient took himself off all monitors. Patient room is ready.  Report to be called     William Ro RN  08/12/20 [id number]

## 2020-08-13 NOTE — PROGRESS NOTES
Comprehensive Nutrition Assessment    Type and Reason for Visit:  Initial, Positive Nutrition Screen(nausea/vomiting)    Nutrition Recommendations/Plan:   Continue carb controlled diet   May offer diabetic oral nutrition supplement during stay    Nutrition Assessment:  Admit with epigastric pain. Carb controlled diet on hold, NPO at this time. Moderate nutrition risk at this time with predicetd suboptimal intake. As diet resumes may offer nutrition supplement if tolerated. Malnutrition Assessment:  Malnutrition Status: At risk for malnutrition (Comment)    Context:  Acute Illness       Estimated Daily Nutrient Needs:  Energy (kcal):  5839-5869; Weight Used for Energy Requirements:  Current     Protein (g):  83-99 (1-1.2 g/kg); Weight Used for Protein Requirements:  Ideal        Fluid (ml/day):  2000 (1ml/kin); Weight Used for Fluid Requirements:  Current      Nutrition Related Findings:  curled up asleep in bed on visit, hx DM      Wounds:  None       Current Nutrition Therapies:    Diet NPO, After Midnight    Anthropometric Measures:  · Height: 6' 1\" (185.4 cm)  · Current Body Weight: 251 lb 1.7 oz (113.9 kg)   · Admission Body Weight: 248 lb 7.3 oz (112.7 kg)    · Usual Body Weight: 251 lb (113.9 kg)(per hx)     · Ideal Body Weight: 184 lbs; % Ideal Body Weight 136.5 %   · BMI: 33.1  · Adjusted Body Weight:  ; No Adjustment   · BMI Categories: Obese Class 1 (BMI 30.0-34. 9)       Nutrition Diagnosis:   · Predicted inadequate energy intake related to pain as evidenced by vomiting, nausea, poor intake prior to admission      Nutrition Interventions:   Food and/or Nutrient Delivery:  Start Oral Diet, Start Oral Nutrition Supplement  Nutrition Education/Counseling:  No recommendation at this time   Coordination of Nutrition Care:  Continued Inpatient Monitoring    Goals:  Patient will tolerate diet to consume at least 50-75% at meals during stay       Nutrition Monitoring and Evaluation:   Food/Nutrient Intake Outcomes:  Food and Nutrient Intake, Diet Advancement/Tolerance  Physical Signs/Symptoms Outcomes:  Skin, Weight, Biochemical Data, Nausea or Vomiting, Nutrition Focused Physical Findings     Discharge Planning:     Too soon to determine     Electronically signed by Doris Mcgowan RD, LD on 8/13/20 at 3:11 PM EDT    Contact: [phone]

## 2020-08-13 NOTE — PROGRESS NOTES
Preliminary NM stress test results:  - inferior MI  No ischemia  EF 48%    Discussed with  - can be discharged from cardiology stand point

## 2020-08-13 NOTE — CONSULTS
CARDIOLOGY CONSULT NOTE   Reason for consultation:  Epigastric pain, chronic elevated trop    Referring physician:  Dixie Gonzalez MD     Primary care physician: Gisel Minor      Dear Dr. West Nicely  Thanks for the consult. History of present illness:Anmol is a 71 y. o.year old who  presents with epigastric pain for one hr, patient is very poor historian, he felt better after eating, he denied any chest pain or shortness of breath, patient has CKD, CAD, PAD and required rt PTA of rt PTA and SFA in 2017 and toe amputation on rt foot which has healed completely, he had LHC in 2018 and had patent stents in all major vessels, he did not come to follow up for one yr in office. His epigastric pain was 3/10, sharp, for one hr,non radiating, relieved by eating, no aggravating factors noted, he did have nausea and vomiting, his trop is always elevated, has CKD, and LVEF 40-45%  Blood pressure, cholesterol, blood glucose and weight are well controlled. Past medical history:    has a past medical history of Acid reflux, Acute MI ([address] Arthritis, Broken teeth, CAD (coronary artery disease), Cardiomyopathy ([address] CHF (congestive heart failure) ([address] Chronic back pain, Chronic kidney disease, Diabetes mellitus ([address] Diabetic neuropathy ([address] H/O cardiovascular stress test, H/O Doppler ultrasound, H/O percutaneous left heart catheterization, History of irregular heartbeat, History of syncope, Hyperlipidemia, Hypertension, Leg swelling, Necrotic toes (HCC), Neuropathy, neuropathy, PAD (peripheral artery disease) ([address] PVD (peripheral vascular disease) ([address] Sick sinus syndrome ([address] Sleep apnea, Spinal stenosis, Teeth missing, and Type 2 diabetes mellitus without complication ([address] Past surgical history:   has a past surgical history that includes Coronary angioplasty with stent; Dental surgery; Colonoscopy (08/04/2016); pacemaker placement (06/04/2010); vascular surgery; colectomy (Right, 08/26/2016);  Toe amputation (Right, 09/12/2017); Toe amputation (Right, 01/09/2018); Cardiac catheterization; Cardiac defibrillator placement (06/04/2010); Coronary angioplasty; Cardiac catheterization (07/14/2017); and Cardiac catheterization (11/20/2018). Social History:   reports that he has been smoking cigars. He started smoking about 40 years ago. He has been smoking about 0.00 packs per day for the past 36.00 years. He has never used smokeless tobacco. He reports current drug use. Drug: Marijuana. He reports that he does not drink alcohol.   Family history:   no family history of CAD, STROKE of DM    Allergies   Allergen Reactions    Pcn [Penicillins] Hives    Fentanyl Itching       technetium sestamibi (CARDIOLITE) injection 10 millicurie, ONCE PRN  technetium sestamibi (CARDIOLITE) injection 30 millicurie, ONCE PRN  sodium chloride flush 0.9 % injection 10 mL, PRN  sodium chloride flush 0.9 % injection 10 mL, 2 times per day  sodium chloride flush 0.9 % injection 10 mL, PRN  acetaminophen (TYLENOL) tablet 650 mg, Q6H PRN    Or  acetaminophen (TYLENOL) suppository 650 mg, Q6H PRN  polyethylene glycol (GLYCOLAX) packet 17 g, Daily PRN  promethazine (PHENERGAN) tablet 12.5 mg, Q6H PRN    Or  ondansetron (ZOFRAN) injection 4 mg, Q6H PRN  heparin (porcine) injection 5,000 Units, 3 times per day  insulin lispro (HUMALOG) injection vial 0-6 Units, TID WC  insulin lispro (HUMALOG) injection vial 0-3 Units, Nightly  glucose (GLUTOSE) 40 % oral gel 15 g, PRN  dextrose 50 % IV solution, PRN  glucagon (rDNA) injection 1 mg, PRN  dextrose 5 % solution, PRN  amLODIPine (NORVASC) tablet 10 mg, Daily  clopidogrel (PLAVIX) tablet 75 mg, Daily  doxazosin (CARDURA) tablet 2 mg, Daily  gabapentin (NEURONTIN) capsule 600 mg, TID  guanFACINE (TENEX) tablet 1 mg, Nightly  losartan (COZAAR) tablet 100 mg, QPM  torsemide (DEMADEX) tablet 20 mg, Daily  albuterol sulfate  (90 Base) MCG/ACT inhaler 2 puff, Q4H PRN  metOLazone (ZAROXOLYN) tablet 5 mg, Daily  carvedilol (COREG) tablet 6.25 mg, BID WC  hydrALAZINE (APRESOLINE) 10 mg in sodium chloride 0.9 % 50 mL ivpb, Q6H PRN      Current Facility-Administered Medications   Medication Dose Route Frequency Provider Last Rate Last Dose    technetium sestamibi (CARDIOLITE) injection 10 millicurie  10 millicurie Intravenous ONCE PRN Mino Heller MD        technetium sestamibi (CARDIOLITE) injection 30 millicurie  30 millicurie Intravenous ONCE PRN Mino Heller MD        sodium chloride flush 0.9 % injection 10 mL  10 mL Intravenous PRN Daylin Saleem MD   10 mL at 08/12/20 1433    sodium chloride flush 0.9 % injection 10 mL  10 mL Intravenous 2 times per day Arlyne Cost, APRN - NP        sodium chloride flush 0.9 % injection 10 mL  10 mL Intravenous PRN Arlyne Cost, APRN - NP        acetaminophen (TYLENOL) tablet 650 mg  650 mg Oral Q6H PRN Arlyne Cost, APRN - NP        Or   [facility] acetaminophen (TYLENOL) suppository 650 mg  650 mg Rectal Q6H PRN Arlyne Cost, APRN - NP        polyethylene glycol (GLYCOLAX) packet 17 g  17 g Oral Daily PRN Arlyne Cost, APRN - NP        promethazine (PHENERGAN) tablet 12.5 mg  12.5 mg Oral Q6H PRN Arlyne Cost, APRN - NP        Or    ondansetron (ZOFRAN) injection 4 mg  4 mg Intravenous Q6H PRN Arlyne Cost, APRN - NP        heparin (porcine) injection 5,000 Units  5,000 Units Subcutaneous 3 times per day Arlyne Cost, APRN - NP        insulin lispro (HUMALOG) injection vial 0-6 Units  0-6 Units Subcutaneous TID WC Arlyne Cost, APRN - NP        insulin lispro (HUMALOG) injection vial 0-3 Units  0-3 Units Subcutaneous Nightly Arlyne Cost, APRN - NP        glucose (GLUTOSE) 40 % oral gel 15 g  15 g Oral PRN Arlyne Cost, APRN - NP        dextrose 50 % IV solution  12.5 g Intravenous PRN Arlyne Cost, APRN - NP        glucagon (rDNA) injection 1 mg  1 mg Intramuscular PRN Marisol Barriga Lissett, APRN - NP        dextrose 5 % solution  100 mL/hr Intravenous PRN Earleen Riser, APRN - NP        amLODIPine (NORVASC) tablet 10 mg  10 mg Oral Daily Earleen Riser, APRN - NP        clopidogrel (PLAVIX) tablet 75 mg  75 mg Oral Daily Earleen Riser, APRN - NP        doxazosin (CARDURA) tablet 2 mg  2 mg Oral Daily Earleen Riser, APRN - NP        gabapentin (NEURONTIN) capsule 600 mg  600 mg Oral TID Earleen Riser, APRN - NP        guanFACINE (TENEX) tablet 1 mg  1 mg Oral Nightly Earleen Riser, APRN - NP        losartan (COZAAR) tablet 100 mg  100 mg Oral QPM Earleen Riser, APRN - NP        torsemide (DEMADEX) tablet 20 mg  20 mg Oral Daily Earleen Riser, APRN - NP        albuterol sulfate  (90 Base) MCG/ACT inhaler 2 puff  2 puff Inhalation Q4H PRN Earleen Riser, APRN - NP        metOLazone (ZAROXOLYN) tablet 5 mg  5 mg Oral Daily Earleen Riser, APRN - NP        carvedilol (COREG) tablet 6.25 mg  6.25 mg Oral BID WC Earleen Riser, APRN - NP        hydrALAZINE (APRESOLINE) 10 mg in sodium chloride 0.9 % 50 mL ivpb  10 mg Intravenous Q6H PRN Earleen Riser, APRN - NP   Stopped at 08/13/20 0111     Review of Systems:   · Constitutional: No Fever or Weight Loss   · Eyes: No Decreased Vision  · ENT: No Headaches, Hearing Loss or Vertigo  · Cardiovascular: No chest pain, dyspnea on exertion, palpitations or loss of consciousness  · Respiratory: No cough or wheezing    · Gastrointestinal: No abdominal pain, appetite loss, blood in stools, constipation, diarrhea or heartburn  · Genitourinary: No dysuria, trouble voiding, or hematuria  · Musculoskeletal:  No gait disturbance, weakness or joint complaints  · Integumentary: No rash or pruritis  · Neurological: No TIA or stroke symptoms  · Psychiatric: No anxiety or depression  · Endocrine: No malaise, fatigue or temperature intolerance  · Hematologic/Lymphatic: No bleeding problems, blood clots or swollen lymph nodes  · Allergic/Immunologic: No nasal congestion or hives  All systems negative except as marked. ·   ·      Physical Examination:    Vitals:    08/13/20 0447   BP: 129/80   Pulse: 67   Resp: 18   Temp: 98.6 °F (37 °C)   SpO2: 97%      Wt Readings from Last 3 Encounters:   08/13/20 251 lb 1 oz (113.9 kg)   03/19/20 251 lb (113.9 kg)   02/21/20 265 lb 6.4 oz (120.4 kg)     Body mass index is 33.12 kg/m². General Appearance:  No distress, conversant    Constitutional:  Well developed, Well nourished, No acute distress, Non-toxic appearance. HENT:  Normocephalic, Atraumatic, Bilateral external ears normal, Oropharynx moist, No oral exudates, Nose normal. Neck- Normal range of motion, No tenderness, Supple, No stridor,no apical-carotid delay, no carotid bruit  Eyes:  PERRL, EOMI, Conjunctiva normal, No discharge. Respiratory:  Normal breath sounds, No respiratory distress, No wheezing, No chest tenderness. ,no use of accessory muscles, diaphragm movement is normal  Cardiovascular: (PMI) apex non displaced,no lifts no thrills, no s3,no s4, Normal heart rate, Normal rhythm, No murmurs, No rubs, No gallops. Carotid arteries pulse and amplitude are normal no bruit, no abdominal bruit noted ( normal abdominal aorta ausculation), femoral arteries pulse and amplitude are normal no bruit, pedal pulses are normal  GI:  Bowel sounds normal, Soft, No tenderness, No masses, No pulsatile masses, no hepatosplenomegally, no bruits  : External genitalia appear normal, No masses or lesions. No discharge. No CVA tenderness. Musculoskeletal:  Intact distal pulses, No edema, No tenderness, No cyanosis, No clubbing. Good range of motion in all major joints. No tenderness to palpation or major deformities noted. Back- No tenderness. Integument:  Warm, Dry, No erythema, No rash. Skin: no rash, no ulcers  Lymphatic:  No lymphadenopathy noted.    Neurologic:  Alert & oriented x 3, Normal motor function, Normal medications and tests reviewed, continue all other medications of all above medical condition listed as is.          Dede Damico MD, 8/13/2020 11:17 AM

## 2020-08-13 NOTE — H&P
History and Physical      Name:  Nakia Kaufman /Age/Sex: 1950  (71 y.o. male)   MRN & CSN:  [id number] & [id number] Admission Date/Time: 2020 12:58 PM   Location:  [place] [address] PCP: Tk Diana       Discussed patient with Dr. Haley Sloan and Plan:     Nakia Kaufman is a 71 y.o.  male  who presents with nausea/vomiting, epigastric pain    - ACS rule out  Elevated trop 0.037 (at baseline)  EKG with T wave changes  Consult to [facility], Dr Sascha Collier  Repeat 12 lead in AM  Cont home plavix  Check ECHO (last was 2018 40%)     - N/V, epigastric pain  CT ab/pelv without abnormality  Lactic 1.3, WBC 9, afebrile  Appears resolved  Check gi disease panel if diarrhea continues    - ? Confusion  Reported by family to ED staff  ? HTN encephalopathy;  sbp 213 on arrival  Patient is AAOX3 on exam, NIHSS 0  No s/sx of infection; UA negative  Restart home medications; hydralazine IV PRN for SBP > 170  Patient refused head CT in ED  Check uds      Chronic  - HTN- Cont bb, norvasc  - DM- Start SSI, and cont lantus  - HFrEF- s/p ICD; EF 2018 40%; cont home meds  - CKD- Creat 1.5- at baseline    Discussed patient with ER physician     Diet Carb control   DVT Prophylaxis [] Lovenox, [x]  Heparin, [] SCDs, [] Ambulation   GI Prophylaxis [] PPI,  [] H2 Blocker,  [] Carafate,  [] Diet/Tube Feeds   Code Status Full   Disposition Patient requires continued admission due to epigastric pain   MDM [] Low, [x] Moderate,[]  High  Patient's risk as above due to      [] One or more chronic illnesses with exacerbation progression      [x] Two or more stable chronic illnesses      [x] Undiagnosed new problem with uncertain prognosis      [] Elective major surgery      []Prescription drug management       History of Present Illness:     Chief Complaint: N/V/D epigastric pain    Nakia Kaufman is a 71 y.o.  male  who presents with the above complaints, onset today.   He reports epigastric pain that began this morning and has improved but is still present. Nonradiating pain, not associated with SOB, diaphoresis. Reports non-bloody emesis and diarrhea this morning; denies any episodes of either since arrival in ED. He reported having some formed stool this morning. Denies fever/chills. At time of exam, denies chest pain/pressure, sob, back pain, n/v, diarrhea. Confirms code status. Denies regular alcohol use, illicit drug use. Has PMH DM, HTN, CHF s/p ICD, CAD. Ten point ROS reviewed negative, unless as noted above    Objective: Intake/Output Summary (Last 24 hours) at 8/12/2020 2023  Last data filed at 8/12/2020 1535  Gross per 24 hour   Intake 1000 ml   Output --   Net 1000 ml        Vitals:   Vitals:    08/12/20 1953   BP: (!) 142/82   Pulse: 66   Resp: 18   Temp: 97.9   SpO2: 95%       Physical Exam:     GEN Awake male, sitting upright in bed in no apparent distress. Appears given age. EYES Pupils are equally round. No scleral erythema, discharge, or conjunctivitis. HENT Mucous membranes are moist. Oral pharynx without exudates, no evidence of thrush. NECK Supple, no apparent thyromegaly or masses. RESP Clear to auscultation, no wheezes, rales or rhonchi. Symmetric chest movement while on room air. CARDIO/VASC S1/S2 auscultated. Regular rate without appreciable murmurs, rubs, or gallops. No JVD or carotid bruits. Peripheral pulses equal bilaterally and palpable. No peripheral edema. GI Abdomen is soft without significant tenderness, masses, or guarding. Bowel sounds are normoactive. Rectal exam deferred.  No costovertebral angle tenderness. Fuller catheter is not present. HEME/LYMPH No palpable cervical lymphadenopathy and no hepatosplenomegaly. No petechiae or ecchymoses. MSK No gross joint deformities. Strength equal in BLE and BUE  SKIN Normal coloration, warm, dry. NEURO Cranial nerves appear grossly intact, normal speech, no lateralizing weakness. PSYCH Awake, alert, oriented x 3.   Affect appropriate. Past Medical History:        Past Medical History:   Diagnosis Date    Acid reflux     Acute MI ([facility] 2004, 2008    Arthritis     Back    Broken teeth     Upper Front    CAD (coronary artery disease)     Sees Dr. Cheyenne Menendez [facility])     per old chart    [facility]     Chronic back pain     Chronic kidney disease     STAGE 3 KIDNEY FAILURE- \"from my diabetes- do not follow with any one- have seen Dr Jemma Cano in the past\"    Diabetes mellitus ([facility] Dx 1965    per old chart pt has been diabetic since age 13    Diabetic neuropathy ([facility]     \"in my feet\"    H/O cardiovascular stress test 08/25/2016    H/O Doppler ultrasound 09/27/2018    Moderate disease of the right lower extremity with an JALEN of 0.72. Moderate to severe disease of the left lower extremity with an JALEN of 0.55.   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    Neuropathy     both feet    neuropathy     PAD (peripheral artery disease) ([facility] 09/27/2018    PVD (peripheral vascular disease) [facility]     Sick sinus syndrome ([facility]     Sleep apnea     \"sleep study 3 yrs ago- could not tolerate the cpap made me too dry\"    Spinal stenosis     Teeth missing     Upper And Lower    Type 2 diabetes mellitus without complication ([facility]        PSHX:  has a past surgical history that includes Coronary angioplasty with stent; Dental surgery; Colonoscopy (08/04/2016); pacemaker placement (06/04/2010); vascular surgery; colectomy (Right, 08/26/2016); Toe amputation (Right, 09/12/2017);  Toe amputation (Right, 01/09/2018); Cardiac catheterization; Cardiac defibrillator placement (06/04/2010); Coronary angioplasty; Cardiac catheterization (07/14/2017); and Cardiac catheterization (11/20/2018). Allergies: Allergies   Allergen Reactions    Pcn [Penicillins] Hives    Fentanyl Itching       FAM HX: family history includes Cancer in his brother, father, and son; Diabetes in his brother, mother, and sister; Early Death (age of onset: 62) in his sister; Heart Disease in his brother and sister; High Blood Pressure in his brother, brother, and mother; Neuropathy in his sister; Other in his sister; Stroke in his mother; Vision Loss in his mother. Soc HX:   Social History     Socioeconomic History    Marital status:       Spouse name: None    Number of children: None    Years of education: None    Highest education level: None   Occupational History    None   Social Needs    Financial resource strain: None    Food insecurity     Worry: None     Inability: None    Transportation needs     Medical: None     Non-medical: None   Tobacco Use    Smoking status: Current Some Day Smoker     Packs/day: 0.00     Years: 36.00     Pack years: 0.00     Types: Cigars     Start date: 1/1/1980    Smokeless tobacco: Never Used   Substance and Sexual Activity    Alcohol use: No     Frequency: Never    Drug use: Yes     Types: Marijuana    Sexual activity: Never   Lifestyle    Physical activity     Days per week: None     Minutes per session: None    Stress: None   Relationships    Social connections     Talks on phone: None     Gets together: None     Attends Confucianist service: None     Active member of club or organization: None     Attends meetings of clubs or organizations: None     Relationship status: None    Intimate partner violence     Fear of current or ex partner: None     Emotionally abused: None     Physically abused: None     Forced sexual activity: None   Other Topics Concern    None   Social History Narrative    differently: Inject 45 Units into the skin nightly          Data:     CT HEAD WO CONTRAST [[id number]]         Order Status: No result        XR CHEST PORTABLE [[id number]]  Collected: 08/12/20 1729       Order Status: Completed  Updated: 08/12/20 1733       Narrative:         EXAMINATION:   ONE XRAY VIEW OF THE CHEST     8/12/2020 5:20 pm     COMPARISON:   03/11/2019     HISTORY:   ORDERING SYSTEM PROVIDED HISTORY: nausea, vomiting   TECHNOLOGIST PROVIDED HISTORY:   Reason for exam:->nausea, vomiting   Reason for Exam: nausea, vomiting   Acuity: Acute   Type of Exam: Initial   Additional signs and symptoms: na   Relevant Medical/Surgical History: CHF, CAD     FINDINGS:   Cardiac pacer stable in positioning.  Stable cardiomegaly.  Increasing   airspace opacity in the right lung likely related to edema and slightly   asymmetric due to patient rotation.  No pleural effusion or pneumothorax. The osseous structures are stable.       Impression:         Increasing airspace opacity in the right lung favoring edema and is   asymmetric likely related to patient rotation.       CT ABDOMEN PELVIS W IV CONTRAST [[id number]]  Collected: 08/12/20 1448       Order Status: Completed  Updated: 08/12/20 1454       Narrative:         EXAMINATION:   CT OF THE ABDOMEN AND PELVIS WITH CONTRAST 8/12/2020 2:28 pm     TECHNIQUE:   CT of the abdomen and pelvis was performed with the administration of   intravenous contrast. Multiplanar reformatted images are provided for review. Dose modulation, iterative reconstruction, and/or weight based adjustment of   the mA/kV was utilized to reduce the radiation dose to as low as reasonably   achievable. COMPARISON:   None.      HISTORY:   ORDERING SYSTEM PROVIDED HISTORY: Abdominal pain   TECHNOLOGIST PROVIDED HISTORY:   Reason for exam:->Abdominal pain   Reason for Exam: ABDOMINAL PAIN   Acuity: Acute   Type of Exam: Initial     FINDINGS:   Lower Chest: The lower lung parenchyma is normal Organs:  The liver appears unremarkable and enhances normally, there is no   evidence for mass or intrahepatic biliary ductal dilatation noted to be   present.  The gallbladder is been removed.  The pancreas, adrenal glands and   spleen are normal.  Both kidneys appear unremarkable with no hydronephrosis   or stone. GI/Bowel: Patient has had prior partial colonic resection. Lovina Jose is no   evidence for bowel obstruction the mastoid Modic site appears unremarkable. Pelvis: The bladder is normal there is no free air free fluid     Peritoneum/Retroperitoneum: Atherosclerotic disease seen in the aorta there   is no pathologically enlarged adenopathy     Bones/Soft Tissues:  There are no acute bony or soft tissue abnormalities.       Impression:         No acute intra-abdominal or pelvic process seen.            Suspect unspecified pacemaker failure   Sinus rhythm with short VT with premature atrial complexes   Possible Inferior infarct , age undetermined   T wave abnormality, consider lateral ischemia   Abnormal ECG   When compared with ECG of 25-JUL-2019 15:20,   Borderline criteria for Inferior infarct are now present   T wave inversion now evident in Inferior leads     Electronically signed by MICKY Molina NP on 8/12/2020 at 8:23 PM

## 2020-08-13 NOTE — ED NOTES
Patients daughter [employer] coming to see the patient. She has been updated.  About periods of confusion and pulling out IV and taking himself off monitors       Ed IFEOMA Michelle  08/12/20 2432

## 2020-08-13 NOTE — PROGRESS NOTES
Hospitalist Progress Note      Name:  Mann Grant /Age/Sex: 1950  (71 y.o. male)   MRN & CSN:  [id number] & [id number] Admission Date/Time: 2020 12:58 PM   Location:  [address] [place] [address]-A PCP: Izzy Del Cid Day: 2    Assessment and Plan:   Mann Grant is a 71 y.o.  male  who presents with epigastric pain. Troponins were elevated and he had T wave -changes as a result he was managed for chest pain rule out ACS. Abdomen and pelvis CT were negative he had mildly elevated lactic acid. Patient is currently refusing a lot of work-up. #Elevated troponins- ACS rule out  -Elevated trop 0.037 (at baseline)  -Admission EKG with T wave changes  -Cont home plavix  -Last ECHO EF 40%.   Repeat echo ordered  -Patient declines heparin drip  -Cardiology consult, Dr Estefany Marie       Epigastric pain - N/V, epigastric pain  -CT ab/pelv without abnormality  -Lactic 1.3, WBC 9, afebrile  -Appears resolved  -Patient denies current symptoms at this time     Confusion possible hypertensive encephalopathy-resolved  -Revealed admission 213  -Per family patient was confused  -Currently patient AAO x3  -BP stable and 604L systolic  -We will monitor closely       Chronic  - HTN- Cont bb, norvasc  - DM- Start SSI, and cont lantus  - HFrEF- s/p ICD; EF 2018 40%; cont home meds  - CKD- Creat 1.5- at baseline    Diet Diet NPO, After Midnight   DVT Prophylaxis [] Lovenox, [x]  Heparin, [] SCDs, [] Ambulation   GI Prophylaxis [] PPI,  [] H2 Blocker,  [] Carafate,  [] Diet/Tube Feeds   Code Status Full Code   Disposition Patient requires continued admission due to ACS rule   MDM [] Low, [] Moderate,[]  High  Patient's risk as above due to      History of Present Illness:     Chief Complaint:  epigastric pain  Mann Grant is a 71 y.o.  male  who presents with epigastric pain was found to have elevated troponins being worked up for ACS rule out       Ten point ROS reviewed negative, unless as noted guanFACINE  1 mg Oral Nightly    losartan  100 mg Oral QPM    torsemide  20 mg Oral Daily    metOLazone  5 mg Oral Daily    carvedilol  6.25 mg Oral BID WC      Infusions:    dextrose       PRN Meds: sodium chloride flush, 10 mL, PRN  sodium chloride flush, 10 mL, PRN  acetaminophen, 650 mg, Q6H PRN    Or  acetaminophen, 650 mg, Q6H PRN  polyethylene glycol, 17 g, Daily PRN  promethazine, 12.5 mg, Q6H PRN    Or  ondansetron, 4 mg, Q6H PRN  glucose, 15 g, PRN  dextrose, 12.5 g, PRN  glucagon (rDNA), 1 mg, PRN  dextrose, 100 mL/hr, PRN  albuterol sulfate HFA, 2 puff, Q4H PRN  hydrALAZINE (APRESOLINE) ivpb, 10 mg, Q6H PRN          Electronically signed by Diane Rogers MD on 8/13/2020 at 8:24 AM

## 2020-08-13 NOTE — PLAN OF CARE
Problem: Falls - Risk of:  Goal: Will remain free from falls  Description: Will remain free from falls  Outcome: Ongoing  Goal: Absence of physical injury  Description: Absence of physical injury  Outcome: Ongoing     Problem: Skin Integrity:  Goal: Will show no infection signs and symptoms  Description: Will show no infection signs and symptoms  Outcome: Ongoing  Goal: Absence of new skin breakdown  Description: Absence of new skin breakdown  Outcome: Ongoing     Problem: Activity:  Goal: Risk for activity intolerance will decrease  Description: Risk for activity intolerance will decrease  Outcome: Ongoing     Problem:  Bowel/Gastric:  Goal: Bowel function will improve  Description: Bowel function will improve  Outcome: Ongoing  Goal: Diagnostic test results will improve  Description: Diagnostic test results will improve  Outcome: Ongoing  Goal: Occurrences of nausea will decrease  Description: Occurrences of nausea will decrease  Outcome: Ongoing  Goal: Occurrences of vomiting will decrease  Description: Occurrences of vomiting will decrease  Outcome: Ongoing     Problem: Fluid Volume:  Goal: Maintenance of adequate hydration will improve  Description: Maintenance of adequate hydration will improve  Outcome: Ongoing     Problem: Health Behavior:  Goal: Ability to state signs and symptoms to report to health care provider will improve  Description: Ability to state signs and symptoms to report to health care provider will improve  Outcome: Ongoing     Problem: Physical Regulation:  Goal: Complications related to the disease process, condition or treatment will be avoided or minimized  Description: Complications related to the disease process, condition or treatment will be avoided or minimized  Outcome: Ongoing  Goal: Ability to maintain clinical measurements within normal limits will improve  Description: Ability to maintain clinical measurements within normal limits will improve  Outcome: Ongoing     Problem: Sensory:  Goal: Ability to identify factors that increase the pain will improve  Description: Ability to identify factors that increase the pain will improve  Outcome: Ongoing  Goal: Ability to notify healthcare provider of pain before it becomes unmanageable or unbearable will improve  Description: Ability to notify healthcare provider of pain before it becomes unmanageable or unbearable will improve  Outcome: Ongoing  Goal: Pain level will decrease  Description: Pain level will decrease  Outcome: Ongoing

## 2020-08-13 NOTE — PROGRESS NOTES
Pt refused this meds and heparin last night. He just wanted to sleep. He did not want anything for pain when asked. Will continue to monitor.

## 2020-08-13 NOTE — ED NOTES
Patient continues to refuse ct scan. Patients daughter unable to persuade him. Patient answering everything appropriately while she was in there.       Kailee Stewart RN  08/12/20 5852

## 2020-08-13 NOTE — ED PROVIDER NOTES
I independently examined and evaluated Bola Lang. In brief, 80-year-old male with history of diabetes, hypertension, hyperlipidemia. Presented initially with nausea, vomiting, diarrhea over the last  24 hours. Having abdominal pain as well. No chest pain. However it is epigastric and right upper quadrant. No fevers. Focused exam revealed patient laying on cot, regular rate and rhythm, nonlabored respirations. And soft and nondistended. Alert and oriented, cranial nerves appear grossly intact, strength 5 out of 5 bilateral lower extremities. Sensation intact light touch throughout. No truncal ataxia. .    ED course: Patient had undergone work-up for the nausea vomiting and diarrhea, he does have a significant cardiac history and so physicians assistant have abdominal on cardiac evaluation, noted to have an elevated troponin compared to his previous, does appear that he has some chronic elevation but with a stable kidney function today it is more elevated than his baseline he is complaining of epigastric pain, plan for admission for rule out ACS. Daughter called around 6pm and stated that patient was confused this morning at 830am. No weakness or slurred speech. She was concerned he may be getting dehydrated. Head CT had been ordered, then he got upset and didn't want it done. Cinthia HOPPER spoke with him and he was agreeable, now refusing CT again. This was the time that I was asked to go see him, at 8 PM.  I went and spoke with the patient, he is alert and oriented, he follows all commands, has been neuro intact throughout the 7 hours he has been here and for the physician assistants exams. He is currently refusing a head CT and he is allowed to do this. He is capable of making his own medical decisions at this time. Plan will be for her admission for his elevated troponin    EKG:  Sinus rhythm with short IA interval, rate of 61 bpm with IA interval of 110 ms. PACs noted. No ST elevation.

## 2020-08-13 NOTE — ED NOTES
Patient refusing ct scan. Denny HOPPER in room explaining the need for the scan.  Patient oriented at present and states he doesn't need it       Lexa Anderson RN  08/12/20 2051

## 2020-08-14 VITALS
BODY MASS INDEX: 33.53 KG/M2 | TEMPERATURE: 98 F | OXYGEN SATURATION: 95 % | HEART RATE: 65 BPM | RESPIRATION RATE: 16 BRPM | WEIGHT: 253 LBS | SYSTOLIC BLOOD PRESSURE: 171 MMHG | DIASTOLIC BLOOD PRESSURE: 75 MMHG | HEIGHT: 73 IN

## 2020-08-14 LAB
GLUCOSE BLD-MCNC: 125 MG/DL (ref 70–99)
GLUCOSE BLD-MCNC: 282 MG/DL (ref 70–99)

## 2020-08-14 PROCEDURE — 6360000002 HC RX W HCPCS: Performed by: NURSE PRACTITIONER

## 2020-08-14 PROCEDURE — 2580000003 HC RX 258: Performed by: NURSE PRACTITIONER

## 2020-08-14 PROCEDURE — 94761 N-INVAS EAR/PLS OXIMETRY MLT: CPT

## 2020-08-14 PROCEDURE — 6370000000 HC RX 637 (ALT 250 FOR IP): Performed by: NURSE PRACTITIONER

## 2020-08-14 PROCEDURE — 82962 GLUCOSE BLOOD TEST: CPT

## 2020-08-14 PROCEDURE — 96372 THER/PROPH/DIAG INJ SC/IM: CPT

## 2020-08-14 PROCEDURE — G0378 HOSPITAL OBSERVATION PER HR: HCPCS

## 2020-08-14 RX ORDER — ALBUTEROL SULFATE 90 UG/1
2 AEROSOL, METERED RESPIRATORY (INHALATION) EVERY 4 HOURS PRN
Qty: 1 INHALER | Refills: 3 | Status: SHIPPED | OUTPATIENT
Start: 2020-08-14

## 2020-08-14 RX ADMIN — CLOPIDOGREL BISULFATE 75 MG: 75 TABLET ORAL at 08:38

## 2020-08-14 RX ADMIN — DOXAZOSIN 2 MG: 4 TABLET ORAL at 08:38

## 2020-08-14 RX ADMIN — GABAPENTIN 600 MG: 300 CAPSULE ORAL at 08:38

## 2020-08-14 RX ADMIN — SODIUM CHLORIDE, PRESERVATIVE FREE 10 ML: 5 INJECTION INTRAVENOUS at 08:40

## 2020-08-14 RX ADMIN — HEPARIN SODIUM 5000 UNITS: 5000 INJECTION, SOLUTION INTRAVENOUS; SUBCUTANEOUS at 05:34

## 2020-08-14 RX ADMIN — TORSEMIDE 20 MG: 20 TABLET ORAL at 08:39

## 2020-08-14 RX ADMIN — CARVEDILOL 6.25 MG: 6.25 TABLET, FILM COATED ORAL at 08:37

## 2020-08-14 RX ADMIN — METOLAZONE 5 MG: 5 TABLET ORAL at 08:39

## 2020-08-14 RX ADMIN — GABAPENTIN 600 MG: 300 CAPSULE ORAL at 13:45

## 2020-08-14 RX ADMIN — AMLODIPINE BESYLATE 10 MG: 10 TABLET ORAL at 08:37

## 2020-08-14 ASSESSMENT — PAIN SCALES - GENERAL: PAINLEVEL_OUTOF10: 0

## 2020-08-14 NOTE — DISCHARGE SUMMARY
Discharge Summary    Name:  Ana Mullins /Age/Sex: 1950  (71 y.o. male)   MRN & CSN:  [id number] & [id number] Admission Date/Time: 2020 12:58 PM   Attending:  Lindsey Fitzpatrick MD Discharging Physician: Dominique Herrera MD     Hospital Course:   Ana Mullins is a 71 y.o.  male  who presents with epigastric pain. On work-up patient was found to have elevated troponins and T wave changes. Patient subsequently had a stress test which was normal but showed stable inferior MI and low EF. Cardiology on board       #Elevated troponins- ACS ruled out  -Elevated trop 0.037 (at baseline)  -Admission EKG with T wave changes  -Cont home plavix  -ECHO EF 40-45 %. -Stress test was negative  -Patient reports no complaints at this time  -Cardiology consult, Dr Charlette De La Torre         Epigastric pain -likely GERD  -Patient denies current symptoms at this time     HTN emergency-resolved  Confusion possible hypertensive encephalopathy-resolved  -BP on admission 213, currently more stable  -Patient alert oriented x3.        Chronic  - HTN- Cont bb, norvasc  - DM- Start SSI, and cont lantus  - HFrEF- s/p ICD; EF 2018 40%; The patient expressed appropriate understanding of and agreement with the discharge recommendations, medications, and plan.      Consults this admission:  IP CONSULT TO HOSPITALIST  IP CONSULT TO CARDIOLOGY    Discharge Instruction:   Follow up appointments: Cardiology  Primary care physician:  within 2 weeks    Diet:  cardiac diet   Activity: activity as tolerated  Disposition: Discharged to:   []Home, [x]C, []SNF, []Acute Rehab, []Hospice   Condition on discharge: Stable    Discharge Medications:      Kristi Betts, [address] Medication Instructions YE:[id number]    Printed on:20 1000   Medication Information                      amLODIPine (NORVASC) 10 MG tablet  Take 10 mg by mouth daily             BD ULTRA-FINE PEN NEEDLES 29G X 12.7MM MISC               Blood Pressure KIT  BID for record down in log book             Calcium Alginate (ALGICELL CALCIUM DRESSING 4\"X8) MISC  Apply 1 Device topically daily             carvedilol (COREG) 25 MG tablet  TAKE 1 TABLET BY MOUTH TWICE A DAY WITH MEALS             cephALEXin (KEFLEX) 500 MG capsule  Take 1 capsule by mouth 3 times daily             clopidogrel (PLAVIX) 75 MG tablet  Take 1 tablet by mouth daily             dicyclomine (BENTYL) 10 MG capsule  Take 2 capsules by mouth 4 times daily (before meals and nightly)             doxazosin (CARDURA) 2 MG tablet  TAKE 1 TABLET BY MOUTH EVERY DAY IN THE EVENING             gabapentin (NEURONTIN) 600 MG tablet  Take 600 mg by mouth 3 times daily. guanFACINE (TENEX) 1 MG tablet  TAKE 1 TABLET BY MOUTH EVERY DAY EVERY NIGHT             insulin glargine (LANTUS SOLOSTAR) 100 UNIT/ML injection pen  Inject 40 Units into the skin nightly             losartan (COZAAR) 100 MG tablet  Take 1 tablet by mouth every evening             metOLazone (ZAROXOLYN) 5 MG tablet  TAKE 1 TABLET BY MOUTH EVERY DAY             ondansetron (ZOFRAN ODT) 4 MG disintegrating tablet  Take 1 tablet by mouth every 6 hours             PROMOGRAN COREY WOUND DRESSING MATRIX  Apply topically Apply to left daily and cover with gauze             torsemide (DEMADEX) 20 MG tablet  Take 1 tablet by mouth daily             TRULICITY 4.79 VU/8.6NK SOPN               VENTOLIN  (90 Base) MCG/ACT inhaler                   Objective Findings at Discharge:   BP (!) 171/75   Pulse 65   Temp 98 °F (36.7 °C) (Oral)   Resp 16   Ht 6' 1\" (1.854 m)   Wt 253 lb (114.8 kg)   SpO2 95%   BMI 33.38 kg/m²            PHYSICAL EXAM   GEN Awake male, sitting upright in bed in no apparent distress. Appears given age. EYES Pupils are equally round. No scleral erythema, discharge, or conjunctivitis. HENT Mucous membranes are moist. Oral pharynx without exudates, no evidence of thrush.   NECK Supple, no apparent thyromegaly or masses. RESP Clear to auscultation, no wheezes, rales or rhonchi. Symmetric chest movement while on room air. CARDIO/VASC S1/S2 auscultated. Regular rate without appreciable murmurs, rubs, or gallops. No JVD or carotid bruits. Peripheral pulses equal bilaterally and palpable. No peripheral edema. GI Abdomen is soft without significant tenderness, masses, or guarding. Bowel sounds are normoactive. Rectal exam deferred.  No costovertebral angle tenderness. Normal appearing external genitalia. Fuller catheter is not present. HEME/LYMPH No palpable cervical lymphadenopathy and no hepatosplenomegaly. No petechiae or ecchymoses. MSK bilateral lower extremity 2 weeks chronic stasis dermatitis changes . no gross joint deformities. SKIN Normal coloration, warm, dry. NEURO Cranial nerves appear grossly intact, normal speech, no lateralizing weakness. PSYCH Awake, alert, oriented x 4. Affect appropriate.     BMP/CBC  Recent Labs     08/12/20  1314 08/13/20  0047   * 139   K 4.4 4.5   CL 98* 105   CO2 27 26   BUN 13 15   CREATININE 1.5* 1.5*   WBC 9.4 7.5   HCT 46.1 42.4    169       IMAGING:      Discharge Time of 35minutes    Electronically signed by Mica Gentile MD on 8/14/2020 at 10:00 AM

## 2020-08-14 NOTE — PLAN OF CARE
Problem: Falls - Risk of:  Goal: Will remain free from falls  Description: Will remain free from falls  8/13/2020 2059 by Zulma Canales RN  Outcome: Ongoing  8/13/2020 1618 by Jacob Chiu RN  Outcome: Ongoing  Goal: Absence of physical injury  Description: Absence of physical injury  8/13/2020 2059 by Zulma Canales RN  Outcome: Ongoing  8/13/2020 1618 by Jacob Chiu RN  Outcome: Ongoing     Problem: Skin Integrity:  Goal: Will show no infection signs and symptoms  Description: Will show no infection signs and symptoms  8/13/2020 2059 by Zulma Canales RN  Outcome: Ongoing  8/13/2020 1618 by Jacob Chiu RN  Outcome: Ongoing  Goal: Absence of new skin breakdown  Description: Absence of new skin breakdown  8/13/2020 2059 by Zulma Canales RN  Outcome: Ongoing  8/13/2020 1618 by Jacob Chiu RN  Outcome: Ongoing     Problem: Activity:  Goal: Risk for activity intolerance will decrease  Description: Risk for activity intolerance will decrease  8/13/2020 2059 by Zulma Canales RN  Outcome: Ongoing  8/13/2020 1618 by Jacob Chiu RN  Outcome: Ongoing     Problem:  Bowel/Gastric:  Goal: Bowel function will improve  Description: Bowel function will improve  8/13/2020 2059 by Zulma Canales RN  Outcome: Ongoing  8/13/2020 1618 by Jacob Chiu RN  Outcome: Ongoing  Goal: Diagnostic test results will improve  Description: Diagnostic test results will improve  8/13/2020 2059 by Zulma Canales RN  Outcome: Ongoing  8/13/2020 1618 by Jacob Chiu RN  Outcome: Ongoing  Goal: Occurrences of nausea will decrease  Description: Occurrences of nausea will decrease  8/13/2020 2059 by Zulma Canales RN  Outcome: Ongoing  8/13/2020 1618 by Jacob Chiu RN  Outcome: Ongoing  Goal: Occurrences of vomiting will decrease  Description: Occurrences of vomiting will decrease  8/13/2020 2059 by Zulma Canales RN  Outcome: Ongoing  8/13/2020 1618 by Jacob Chiu RN  Outcome: Ongoing     Problem: Fluid Volume:  Goal: Maintenance of adequate hydration will improve  Description: Maintenance of adequate hydration will improve  8/13/2020 2059 by Glenis Sosa RN  Outcome: Ongoing  8/13/2020 1618 by Alonso Rich RN  Outcome: Ongoing

## 2020-08-18 ENCOUNTER — PROCEDURE VISIT (OUTPATIENT)
Dept: CARDIOLOGY CLINIC | Age: 70
End: 2020-08-18
Payer: MEDICARE

## 2020-08-18 PROCEDURE — 93295 DEV INTERROG REMOTE 1/2/MLT: CPT | Performed by: INTERNAL MEDICINE

## 2020-08-18 PROCEDURE — 93296 REM INTERROG EVL PM/IDS: CPT | Performed by: INTERNAL MEDICINE

## 2020-08-18 PROCEDURE — 93297 REM INTERROG DEV EVAL ICPMS: CPT | Performed by: INTERNAL MEDICINE

## 2020-08-28 ENCOUNTER — HOSPITAL ENCOUNTER (OUTPATIENT)
Age: 70
Discharge: HOME | End: 2020-08-28
Payer: MEDICARE

## 2020-08-28 LAB
ANION GAP SERPL CALCULATED.3IONS-SCNC: 5 MMOL/L (ref 4–16)
BUN BLDV-MCNC: 10 MG/DL (ref 6–23)
CALCIUM SERPL-MCNC: 8.8 MG/DL (ref 8.3–10.6)
CHLORIDE BLD-SCNC: 108 MMOL/L (ref 99–110)
CO2: 29 MMOL/L (ref 21–32)
CREAT SERPL-MCNC: 1.5 MG/DL (ref 0.9–1.3)
GFR AFRICAN AMERICAN: 56 ML/MIN/1.73M2
GFR NON-AFRICAN AMERICAN: 46 ML/MIN/1.73M2
GLUCOSE BLD-MCNC: 109 MG/DL (ref 70–99)
MAGNESIUM: 2 MG/DL (ref 1.8–2.4)
PHOSPHORUS: 2.7 MG/DL (ref 2.5–4.9)
POTASSIUM SERPL-SCNC: 5.1 MMOL/L (ref 3.5–5.1)
SODIUM BLD-SCNC: 142 MMOL/L (ref 135–145)

## 2020-08-28 PROCEDURE — 36415 COLL VENOUS BLD VENIPUNCTURE: CPT

## 2020-08-28 PROCEDURE — 83735 ASSAY OF MAGNESIUM: CPT

## 2020-08-28 PROCEDURE — 84156 ASSAY OF PROTEIN URINE: CPT

## 2020-08-28 PROCEDURE — 84100 ASSAY OF PHOSPHORUS: CPT

## 2020-08-28 PROCEDURE — 80048 BASIC METABOLIC PNL TOTAL CA: CPT

## 2020-09-18 ENCOUNTER — HOSPITAL ENCOUNTER (OUTPATIENT)
Age: 70
Discharge: HOME | End: 2020-09-18
Payer: MEDICARE

## 2020-09-18 LAB
ALBUMIN SERPL-MCNC: 3.6 GM/DL (ref 3.4–5)
ALP BLD-CCNC: 83 IU/L (ref 40–128)
ALT SERPL-CCNC: 6 U/L (ref 10–40)
ANION GAP SERPL CALCULATED.3IONS-SCNC: 6 MMOL/L (ref 4–16)
AST SERPL-CCNC: 9 IU/L (ref 15–37)
BILIRUB SERPL-MCNC: 0.3 MG/DL (ref 0–1)
BUN BLDV-MCNC: 14 MG/DL (ref 6–23)
CALCIUM SERPL-MCNC: 8.9 MG/DL (ref 8.3–10.6)
CHLORIDE BLD-SCNC: 102 MMOL/L (ref 99–110)
CO2: 33 MMOL/L (ref 21–32)
CREAT SERPL-MCNC: 1.7 MG/DL (ref 0.9–1.3)
GFR AFRICAN AMERICAN: 49 ML/MIN/1.73M2
GFR NON-AFRICAN AMERICAN: 40 ML/MIN/1.73M2
GLUCOSE BLD-MCNC: 205 MG/DL (ref 70–99)
MAGNESIUM: 2.3 MG/DL (ref 1.8–2.4)
PHOSPHORUS: 3.7 MG/DL (ref 2.5–4.9)
POTASSIUM SERPL-SCNC: 4.8 MMOL/L (ref 3.5–5.1)
SODIUM BLD-SCNC: 141 MMOL/L (ref 135–145)
TOTAL PROTEIN: 6.4 GM/DL (ref 6.4–8.2)

## 2020-09-18 PROCEDURE — 83735 ASSAY OF MAGNESIUM: CPT

## 2020-09-18 PROCEDURE — 80053 COMPREHEN METABOLIC PANEL: CPT

## 2020-09-18 PROCEDURE — 84100 ASSAY OF PHOSPHORUS: CPT

## 2020-09-18 PROCEDURE — 36415 COLL VENOUS BLD VENIPUNCTURE: CPT

## 2020-10-22 ENCOUNTER — HOSPITAL ENCOUNTER (INPATIENT)
Age: 70
LOS: 2 days | Discharge: HOME HEALTH | DRG: 393 | End: 2020-10-24
Attending: EMERGENCY MEDICINE | Admitting: STUDENT IN AN ORGANIZED HEALTH CARE EDUCATION/TRAINING PROGRAM
Payer: MEDICARE

## 2020-10-22 ENCOUNTER — APPOINTMENT (OUTPATIENT)
Dept: GENERAL RADIOLOGY | Age: 70
DRG: 393 | End: 2020-10-22
Payer: MEDICARE

## 2020-10-22 ENCOUNTER — APPOINTMENT (OUTPATIENT)
Dept: CT IMAGING | Age: 70
DRG: 393 | End: 2020-10-22
Payer: MEDICARE

## 2020-10-22 PROBLEM — I50.43 ACUTE ON CHRONIC HEART FAILURE WITH REDUCED EJECTION FRACTION AND DIASTOLIC DYSFUNCTION (HCC): Status: ACTIVE | Noted: 2020-10-22

## 2020-10-22 LAB
ALBUMIN SERPL-MCNC: 3.3 GM/DL (ref 3.4–5)
ALP BLD-CCNC: 62 IU/L (ref 40–128)
ALT SERPL-CCNC: 6 U/L (ref 10–40)
ANION GAP SERPL CALCULATED.3IONS-SCNC: 10 MMOL/L (ref 4–16)
AST SERPL-CCNC: 10 IU/L (ref 15–37)
BACTERIA: NEGATIVE /HPF
BASOPHILS ABSOLUTE: 0 K/CU MM
BASOPHILS RELATIVE PERCENT: 0.4 % (ref 0–1)
BILIRUB SERPL-MCNC: 0.6 MG/DL (ref 0–1)
BILIRUBIN URINE: NEGATIVE MG/DL
BLOOD, URINE: NEGATIVE
BUN BLDV-MCNC: 13 MG/DL (ref 6–23)
CALCIUM SERPL-MCNC: 8.6 MG/DL (ref 8.3–10.6)
CHLORIDE BLD-SCNC: 104 MMOL/L (ref 99–110)
CLARITY: CLEAR
CO2: 24 MMOL/L (ref 21–32)
COLOR: YELLOW
CREAT SERPL-MCNC: 1.5 MG/DL (ref 0.9–1.3)
DIFFERENTIAL TYPE: ABNORMAL
EOSINOPHILS ABSOLUTE: 0.1 K/CU MM
EOSINOPHILS RELATIVE PERCENT: 1.1 % (ref 0–3)
GFR AFRICAN AMERICAN: 56 ML/MIN/1.73M2
GFR NON-AFRICAN AMERICAN: 46 ML/MIN/1.73M2
GLUCOSE BLD-MCNC: 141 MG/DL (ref 70–99)
GLUCOSE BLD-MCNC: 166 MG/DL (ref 70–99)
GLUCOSE BLD-MCNC: 170 MG/DL (ref 70–99)
GLUCOSE BLD-MCNC: 173 MG/DL (ref 70–99)
GLUCOSE, URINE: NEGATIVE MG/DL
HCT VFR BLD CALC: 37.9 % (ref 42–52)
HEMOGLOBIN: 11.9 GM/DL (ref 13.5–18)
IMMATURE NEUTROPHIL %: 0.2 % (ref 0–0.43)
KETONES, URINE: ABNORMAL MG/DL
LACTATE: 1.2 MMOL/L (ref 0.4–2)
LEUKOCYTE ESTERASE, URINE: NEGATIVE
LIPASE: 20 IU/L (ref 13–60)
LYMPHOCYTES ABSOLUTE: 1.2 K/CU MM
LYMPHOCYTES RELATIVE PERCENT: 13.9 % (ref 24–44)
MCH RBC QN AUTO: 30.3 PG (ref 27–31)
MCHC RBC AUTO-ENTMCNC: 31.4 % (ref 32–36)
MCV RBC AUTO: 96.4 FL (ref 78–100)
MONOCYTES ABSOLUTE: 0.4 K/CU MM
MONOCYTES RELATIVE PERCENT: 4.2 % (ref 0–4)
MUCUS: ABNORMAL HPF
NITRITE URINE, QUANTITATIVE: NEGATIVE
NUCLEATED RBC %: 0 %
PDW BLD-RTO: 12.9 % (ref 11.7–14.9)
PH, URINE: 7 (ref 5–8)
PLATELET # BLD: 214 K/CU MM (ref 140–440)
PMV BLD AUTO: 9.7 FL (ref 7.5–11.1)
POTASSIUM SERPL-SCNC: 4.4 MMOL/L (ref 3.5–5.1)
PRO-BNP: 3060 PG/ML
PROTEIN UA: 100 MG/DL
RBC # BLD: 3.93 M/CU MM (ref 4.6–6.2)
RBC URINE: 1 /HPF (ref 0–3)
SEGMENTED NEUTROPHILS ABSOLUTE COUNT: 6.9 K/CU MM
SEGMENTED NEUTROPHILS RELATIVE PERCENT: 80.2 % (ref 36–66)
SODIUM BLD-SCNC: 138 MMOL/L (ref 135–145)
SPECIFIC GRAVITY UA: 1.01 (ref 1–1.03)
SQUAMOUS EPITHELIAL: <1 /HPF
TOTAL IMMATURE NEUTOROPHIL: 0.02 K/CU MM
TOTAL NUCLEATED RBC: 0 K/CU MM
TOTAL PROTEIN: 6.8 GM/DL (ref 6.4–8.2)
TRICHOMONAS: ABNORMAL /HPF
TROPONIN T: 0.01 NG/ML
TROPONIN T: 0.01 NG/ML
UROBILINOGEN, URINE: 1 MG/DL (ref 0.2–1)
WBC # BLD: 8.6 K/CU MM (ref 4–10.5)
WBC UA: 1 /HPF (ref 0–2)

## 2020-10-22 PROCEDURE — 6370000000 HC RX 637 (ALT 250 FOR IP): Performed by: PHYSICIAN ASSISTANT

## 2020-10-22 PROCEDURE — 1200000000 HC SEMI PRIVATE

## 2020-10-22 PROCEDURE — 87449 NOS EACH ORGANISM AG IA: CPT

## 2020-10-22 PROCEDURE — 2500000003 HC RX 250 WO HCPCS: Performed by: EMERGENCY MEDICINE

## 2020-10-22 PROCEDURE — 82962 GLUCOSE BLOOD TEST: CPT

## 2020-10-22 PROCEDURE — 99285 EMERGENCY DEPT VISIT HI MDM: CPT

## 2020-10-22 PROCEDURE — 85025 COMPLETE CBC W/AUTO DIFF WBC: CPT

## 2020-10-22 PROCEDURE — 87899 AGENT NOS ASSAY W/OPTIC: CPT

## 2020-10-22 PROCEDURE — 6360000002 HC RX W HCPCS: Performed by: PHYSICIAN ASSISTANT

## 2020-10-22 PROCEDURE — 80053 COMPREHEN METABOLIC PANEL: CPT

## 2020-10-22 PROCEDURE — 83605 ASSAY OF LACTIC ACID: CPT

## 2020-10-22 PROCEDURE — 6370000000 HC RX 637 (ALT 250 FOR IP): Performed by: STUDENT IN AN ORGANIZED HEALTH CARE EDUCATION/TRAINING PROGRAM

## 2020-10-22 PROCEDURE — 6360000002 HC RX W HCPCS: Performed by: STUDENT IN AN ORGANIZED HEALTH CARE EDUCATION/TRAINING PROGRAM

## 2020-10-22 PROCEDURE — 96374 THER/PROPH/DIAG INJ IV PUSH: CPT

## 2020-10-22 PROCEDURE — 93005 ELECTROCARDIOGRAM TRACING: CPT | Performed by: PHYSICIAN ASSISTANT

## 2020-10-22 PROCEDURE — 81001 URINALYSIS AUTO W/SCOPE: CPT

## 2020-10-22 PROCEDURE — 96375 TX/PRO/DX INJ NEW DRUG ADDON: CPT

## 2020-10-22 PROCEDURE — 2580000003 HC RX 258: Performed by: EMERGENCY MEDICINE

## 2020-10-22 PROCEDURE — 83880 ASSAY OF NATRIURETIC PEPTIDE: CPT

## 2020-10-22 PROCEDURE — 71045 X-RAY EXAM CHEST 1 VIEW: CPT

## 2020-10-22 PROCEDURE — 94761 N-INVAS EAR/PLS OXIMETRY MLT: CPT

## 2020-10-22 PROCEDURE — 93010 ELECTROCARDIOGRAM REPORT: CPT | Performed by: INTERNAL MEDICINE

## 2020-10-22 PROCEDURE — 74174 CTA ABD&PLVS W/CONTRAST: CPT

## 2020-10-22 PROCEDURE — 2580000003 HC RX 258: Performed by: STUDENT IN AN ORGANIZED HEALTH CARE EDUCATION/TRAINING PROGRAM

## 2020-10-22 PROCEDURE — 84484 ASSAY OF TROPONIN QUANT: CPT

## 2020-10-22 PROCEDURE — 96376 TX/PRO/DX INJ SAME DRUG ADON: CPT

## 2020-10-22 PROCEDURE — 6360000004 HC RX CONTRAST MEDICATION: Performed by: EMERGENCY MEDICINE

## 2020-10-22 PROCEDURE — 2140000000 HC CCU INTERMEDIATE R&B

## 2020-10-22 PROCEDURE — 2700000000 HC OXYGEN THERAPY PER DAY

## 2020-10-22 PROCEDURE — 83690 ASSAY OF LIPASE: CPT

## 2020-10-22 RX ORDER — PROMETHAZINE HYDROCHLORIDE 25 MG/1
12.5 TABLET ORAL EVERY 6 HOURS PRN
Status: DISCONTINUED | OUTPATIENT
Start: 2020-10-22 | End: 2020-10-24 | Stop reason: HOSPADM

## 2020-10-22 RX ORDER — AMLODIPINE BESYLATE 10 MG/1
10 TABLET ORAL DAILY
Status: DISCONTINUED | OUTPATIENT
Start: 2020-10-22 | End: 2020-10-24 | Stop reason: HOSPADM

## 2020-10-22 RX ORDER — SODIUM CHLORIDE 0.9 % (FLUSH) 0.9 %
10 SYRINGE (ML) INJECTION EVERY 12 HOURS SCHEDULED
Status: DISCONTINUED | OUTPATIENT
Start: 2020-10-22 | End: 2020-10-24 | Stop reason: HOSPADM

## 2020-10-22 RX ORDER — LOSARTAN POTASSIUM 50 MG/1
100 TABLET ORAL ONCE
Status: COMPLETED | OUTPATIENT
Start: 2020-10-22 | End: 2020-10-22

## 2020-10-22 RX ORDER — AMLODIPINE BESYLATE 5 MG/1
10 TABLET ORAL ONCE
Status: COMPLETED | OUTPATIENT
Start: 2020-10-22 | End: 2020-10-22

## 2020-10-22 RX ORDER — MORPHINE SULFATE 4 MG/ML
4 INJECTION, SOLUTION INTRAMUSCULAR; INTRAVENOUS ONCE
Status: COMPLETED | OUTPATIENT
Start: 2020-10-22 | End: 2020-10-22

## 2020-10-22 RX ORDER — CARVEDILOL 25 MG/1
25 TABLET ORAL ONCE
Status: COMPLETED | OUTPATIENT
Start: 2020-10-22 | End: 2020-10-22

## 2020-10-22 RX ORDER — ONDANSETRON 2 MG/ML
4 INJECTION INTRAMUSCULAR; INTRAVENOUS ONCE
Status: COMPLETED | OUTPATIENT
Start: 2020-10-22 | End: 2020-10-22

## 2020-10-22 RX ORDER — ACETAMINOPHEN 325 MG/1
650 TABLET ORAL EVERY 6 HOURS PRN
Status: DISCONTINUED | OUTPATIENT
Start: 2020-10-22 | End: 2020-10-24 | Stop reason: HOSPADM

## 2020-10-22 RX ORDER — TORSEMIDE 20 MG/1
20 TABLET ORAL 2 TIMES DAILY
Status: DISCONTINUED | OUTPATIENT
Start: 2020-10-22 | End: 2020-10-23

## 2020-10-22 RX ORDER — CYCLOBENZAPRINE HCL 10 MG
10 TABLET ORAL 3 TIMES DAILY PRN
Status: DISCONTINUED | OUTPATIENT
Start: 2020-10-22 | End: 2020-10-24 | Stop reason: HOSPADM

## 2020-10-22 RX ORDER — ONDANSETRON 2 MG/ML
4 INJECTION INTRAMUSCULAR; INTRAVENOUS EVERY 6 HOURS PRN
Status: DISCONTINUED | OUTPATIENT
Start: 2020-10-22 | End: 2020-10-24 | Stop reason: HOSPADM

## 2020-10-22 RX ORDER — GABAPENTIN 300 MG/1
600 CAPSULE ORAL 2 TIMES DAILY
Status: DISCONTINUED | OUTPATIENT
Start: 2020-10-22 | End: 2020-10-24 | Stop reason: HOSPADM

## 2020-10-22 RX ORDER — FUROSEMIDE 10 MG/ML
40 INJECTION INTRAMUSCULAR; INTRAVENOUS 2 TIMES DAILY
Status: DISCONTINUED | OUTPATIENT
Start: 2020-10-22 | End: 2020-10-23

## 2020-10-22 RX ORDER — CARVEDILOL 25 MG/1
25 TABLET ORAL 2 TIMES DAILY WITH MEALS
Status: DISCONTINUED | OUTPATIENT
Start: 2020-10-22 | End: 2020-10-24 | Stop reason: HOSPADM

## 2020-10-22 RX ORDER — ACETAMINOPHEN 650 MG/1
650 SUPPOSITORY RECTAL EVERY 6 HOURS PRN
Status: DISCONTINUED | OUTPATIENT
Start: 2020-10-22 | End: 2020-10-24 | Stop reason: HOSPADM

## 2020-10-22 RX ORDER — SODIUM CHLORIDE 0.9 % (FLUSH) 0.9 %
10 SYRINGE (ML) INJECTION PRN
Status: DISCONTINUED | OUTPATIENT
Start: 2020-10-22 | End: 2020-10-24 | Stop reason: HOSPADM

## 2020-10-22 RX ORDER — POLYETHYLENE GLYCOL 3350 17 G/17G
17 POWDER, FOR SOLUTION ORAL DAILY
Status: DISCONTINUED | OUTPATIENT
Start: 2020-10-22 | End: 2020-10-24

## 2020-10-22 RX ORDER — DOXAZOSIN MESYLATE 1 MG/1
2 TABLET ORAL DAILY
Status: DISCONTINUED | OUTPATIENT
Start: 2020-10-22 | End: 2020-10-24 | Stop reason: HOSPADM

## 2020-10-22 RX ORDER — DICYCLOMINE HYDROCHLORIDE 10 MG/1
20 CAPSULE ORAL
Qty: 30 CAPSULE | Refills: 0 | Status: SHIPPED | OUTPATIENT
Start: 2020-10-22 | End: 2020-10-24 | Stop reason: SDUPTHER

## 2020-10-22 RX ORDER — INSULIN GLARGINE 100 [IU]/ML
40 INJECTION, SOLUTION SUBCUTANEOUS NIGHTLY
Status: DISCONTINUED | OUTPATIENT
Start: 2020-10-22 | End: 2020-10-24

## 2020-10-22 RX ORDER — CLOPIDOGREL BISULFATE 75 MG/1
75 TABLET ORAL DAILY
Status: DISCONTINUED | OUTPATIENT
Start: 2020-10-22 | End: 2020-10-24 | Stop reason: HOSPADM

## 2020-10-22 RX ORDER — LOSARTAN POTASSIUM 100 MG/1
100 TABLET ORAL EVERY EVENING
Status: DISCONTINUED | OUTPATIENT
Start: 2020-10-22 | End: 2020-10-23

## 2020-10-22 RX ORDER — LABETALOL HYDROCHLORIDE 5 MG/ML
10 INJECTION, SOLUTION INTRAVENOUS ONCE
Status: COMPLETED | OUTPATIENT
Start: 2020-10-22 | End: 2020-10-22

## 2020-10-22 RX ORDER — FUROSEMIDE 10 MG/ML
40 INJECTION INTRAMUSCULAR; INTRAVENOUS ONCE
Status: COMPLETED | OUTPATIENT
Start: 2020-10-22 | End: 2020-10-22

## 2020-10-22 RX ORDER — ONDANSETRON 4 MG/1
4 TABLET, ORALLY DISINTEGRATING ORAL EVERY 8 HOURS PRN
Qty: 10 TABLET | Refills: 0 | Status: SHIPPED | OUTPATIENT
Start: 2020-10-22 | End: 2020-10-24 | Stop reason: SDUPTHER

## 2020-10-22 RX ORDER — SODIUM CHLORIDE 9 MG/ML
INJECTION, SOLUTION INTRAVENOUS CONTINUOUS
Status: ACTIVE | OUTPATIENT
Start: 2020-10-22 | End: 2020-10-24

## 2020-10-22 RX ORDER — DOXAZOSIN MESYLATE 4 MG/1
4 TABLET ORAL ONCE
Status: COMPLETED | OUTPATIENT
Start: 2020-10-22 | End: 2020-10-22

## 2020-10-22 RX ADMIN — CARVEDILOL 25 MG: 25 TABLET, FILM COATED ORAL at 07:02

## 2020-10-22 RX ADMIN — LOSARTAN POTASSIUM 100 MG: 100 TABLET, FILM COATED ORAL at 17:21

## 2020-10-22 RX ADMIN — ENOXAPARIN SODIUM 40 MG: 40 INJECTION SUBCUTANEOUS at 13:29

## 2020-10-22 RX ADMIN — GABAPENTIN 600 MG: 300 CAPSULE ORAL at 13:28

## 2020-10-22 RX ADMIN — SODIUM CHLORIDE: 9 INJECTION, SOLUTION INTRAVENOUS at 05:31

## 2020-10-22 RX ADMIN — AMLODIPINE BESYLATE 10 MG: 10 TABLET ORAL at 13:28

## 2020-10-22 RX ADMIN — ONDANSETRON 4 MG: 2 INJECTION INTRAMUSCULAR; INTRAVENOUS at 08:49

## 2020-10-22 RX ADMIN — TORSEMIDE 20 MG: 20 TABLET ORAL at 13:28

## 2020-10-22 RX ADMIN — AMLODIPINE BESYLATE 10 MG: 5 TABLET ORAL at 06:38

## 2020-10-22 RX ADMIN — LOSARTAN POTASSIUM 100 MG: 50 TABLET, FILM COATED ORAL at 06:38

## 2020-10-22 RX ADMIN — ONDANSETRON 4 MG: 2 INJECTION INTRAMUSCULAR; INTRAVENOUS at 04:19

## 2020-10-22 RX ADMIN — CARVEDILOL 25 MG: 25 TABLET, FILM COATED ORAL at 17:21

## 2020-10-22 RX ADMIN — GABAPENTIN 600 MG: 300 CAPSULE ORAL at 22:23

## 2020-10-22 RX ADMIN — LABETALOL HYDROCHLORIDE 10 MG: 5 INJECTION, SOLUTION INTRAVENOUS at 04:27

## 2020-10-22 RX ADMIN — INSULIN GLARGINE 40 UNITS: 100 INJECTION, SOLUTION SUBCUTANEOUS at 22:23

## 2020-10-22 RX ADMIN — TORSEMIDE 20 MG: 20 TABLET ORAL at 22:23

## 2020-10-22 RX ADMIN — SODIUM CHLORIDE, PRESERVATIVE FREE 10 ML: 5 INJECTION INTRAVENOUS at 22:23

## 2020-10-22 RX ADMIN — MORPHINE SULFATE 4 MG: 4 INJECTION, SOLUTION INTRAMUSCULAR; INTRAVENOUS at 04:19

## 2020-10-22 RX ADMIN — IOPAMIDOL 100 ML: 755 INJECTION, SOLUTION INTRAVENOUS at 05:24

## 2020-10-22 RX ADMIN — ONDANSETRON 4 MG: 2 INJECTION INTRAMUSCULAR; INTRAVENOUS at 17:21

## 2020-10-22 RX ADMIN — DOXAZOSIN 4 MG: 4 TABLET ORAL at 07:02

## 2020-10-22 RX ADMIN — DOXAZOSIN 2 MG: 1 TABLET ORAL at 13:29

## 2020-10-22 RX ADMIN — FUROSEMIDE 40 MG: 10 INJECTION, SOLUTION INTRAVENOUS at 09:17

## 2020-10-22 RX ADMIN — POLYETHYLENE GLYCOL (3350) 17 G: 17 POWDER, FOR SOLUTION ORAL at 13:29

## 2020-10-22 RX ADMIN — FUROSEMIDE 40 MG: 10 INJECTION, SOLUTION INTRAMUSCULAR; INTRAVENOUS at 17:21

## 2020-10-22 RX ADMIN — CLOPIDOGREL BISULFATE 75 MG: 75 TABLET ORAL at 13:28

## 2020-10-22 ASSESSMENT — PAIN DESCRIPTION - PAIN TYPE
TYPE: CHRONIC PAIN
TYPE: ACUTE PAIN
TYPE: ACUTE PAIN

## 2020-10-22 ASSESSMENT — PAIN DESCRIPTION - DESCRIPTORS
DESCRIPTORS: ACHING;SORE
DESCRIPTORS: TINGLING

## 2020-10-22 ASSESSMENT — PAIN SCALES - GENERAL
PAINLEVEL_OUTOF10: 8
PAINLEVEL_OUTOF10: 5
PAINLEVEL_OUTOF10: 0
PAINLEVEL_OUTOF10: 0
PAINLEVEL_OUTOF10: 8
PAINLEVEL_OUTOF10: 2
PAINLEVEL_OUTOF10: 0

## 2020-10-22 ASSESSMENT — PAIN DESCRIPTION - FREQUENCY
FREQUENCY: CONTINUOUS
FREQUENCY: CONTINUOUS

## 2020-10-22 ASSESSMENT — PAIN DESCRIPTION - LOCATION
LOCATION: FOOT
LOCATION: BUTTOCKS
LOCATION: ABDOMEN

## 2020-10-22 ASSESSMENT — PAIN DESCRIPTION - PROGRESSION
CLINICAL_PROGRESSION: GRADUALLY IMPROVING
CLINICAL_PROGRESSION: NOT CHANGED

## 2020-10-22 ASSESSMENT — PAIN DESCRIPTION - ONSET
ONSET: ON-GOING
ONSET: ON-GOING

## 2020-10-22 NOTE — ED NOTES
Patient continues to drop into the low 80's on room air. Placed on 2 liters and up to 92-93%.  Notified Coretta HOPPER and told to not discharge the patient at this time for re evalutaion     Kylee Hickman RN  10/22/20 5706

## 2020-10-22 NOTE — H&P
History and Physical      Name:  Lauryn Root /Age/Sex: 1950  (79 y.o. male)   MRN & CSN:  [id number] & [id number] Admission Date/Time: 10/22/2020  3:20 AM   Location:  [facility] PCP: Freedom Saavedra Day: 1    Assessment and Plan:   Lauryn Root is a 79 y.o.  male  who presents with chest pain and abominal pain:     Upper epigastric pain, likely gastroparesis   Mild acute on chronic heart failure with reduced EF. Chest x-ray shows some pulmonary congestion   Chest pain, more upper epigastric pain. No suspicion for cardiac origin   Cardiomegaly   Obesity with BMI of 36   Diabetes type 2 on long-term insulin. Currently controlled however he admits to an extended period of time where he was uncontrolled.  Hypertension, medicated   Chronic bilateral lower extremity lymphedema, not exacerbated    Will give dose of IV diuretics today x2 doses  A.m. labs  Strict I's and O's  Accurate weight of patient  Restart home medications  Sliding scale insulin on top of his chronic insulin  Viral panels  Consider modified barium swallow    Diet No diet orders on file   DVT Prophylaxis [] Lovenox, []  Heparin, [] SCDs, [] No VTE prophylaxis, patient ambulating   GI Prophylaxis [] PPI, [] H2 Blocker, [] No GI prophylaxis, patient is receiving diet/Tube Feeds   Code Status Prior   Disposition Patient requires continued admission due to IV medications    Planning for DC to home   MDM [] Low, [x] Moderate,[]  High  Patient's risk as above due to     [] One or more chronic illnesses with mild exacerbation progression    [x] Two or more stable chronic illnesses    [] Undiagnosed new problem with uncertain prognosis    [] Elective major surgery    []Prescription drug management     History of Present Illness:     Chief Complaint:   Lauryn Root is a 79 y.o.  male  who presents with epigastric pain. Patient seen and examined on the medical surgical floor.   He points out his epigastric pain which has persisted now for 3 days. No radiation of the symptoms including down to his belly, into his chest or around his flanks. He does admit to associated chest pain but again points to his epigastric area. Some alleviation with emesis and pain worse with palpation     10-14 point ROS reviewed negative, unless as noted above    Objective:   No intake or output data in the 24 hours ending 10/22/20 1025   Vitals:   Vitals:    10/22/20 0745   BP: (!) 185/66   Pulse: 70   Resp: 16   Temp: 98.2 °F (36.8 °C)   SpO2: 97%     Physical Exam:    GEN Awake male, laying in bed in no apparent distress. Appears given age. EYES Pupils are equally round. No scleral discharge  HENT Atraumatic and symmetric head  NECK No apparent thyromegaly  RESP Symmetric chest movement while on room air. CARDIO/VASC Peripheral pulses equal bilaterally and palpable. No acute peripheral edema. GI Abdomen is not distended. Rectal exam deferred. Central obesity   Fuller catheter is not present. HEME/LYMPH No petechiae or ecchymoses. MSK Spontaneous movement of BL upper extremities. Chronic appearing lymphedema bilateral lower extremities  SKIN Normal coloration, warm, dry. NEURO Cranial nerves appear grossly intact  PSYCH Awake, alert. Oriented x4    Past Medical History:    PMHx:   Past Medical History:   Diagnosis Date    Acid reflux     Acute MI ([address] 2004, 2008    Arthritis     Back    Broken teeth     Upper Front    CAD (coronary artery disease)     Sees Dr. Viral Olivas [facility])     per old chart    CHF (congestive heart failure) ([address]     Chronic back pain     Chronic kidney disease     STAGE 3 KIDNEY FAILURE- \"from my diabetes- do not follow with any one- have seen Dr Iris Jmi in the past\"    Diabetes mellitus ([address] Dx 1965    per old chart pt has been diabetic since age 13    Diabetic neuropathy ([address]     \"in my feet\"    H/O cardiovascular stress test 08/25/2016    H/O Doppler ultrasound 09/27/2018 Moderate disease of the right lower extremity with an JALEN of 0.72. Moderate to severe disease of the left lower extremity with an JALEN of 0.55.   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    Neuropathy     both feet    neuropathy     PAD (peripheral artery disease) ([facility] 09/27/2018    PVD (peripheral vascular disease) ([facility]     Sick sinus syndrome ([facility]     Sleep apnea     \"sleep study 3 yrs ago- could not tolerate the cpap made me too dry\"    Spinal stenosis     Teeth missing     Upper And Lower    Type 2 diabetes mellitus without complication ([facility]      PSHx:  has a past surgical history that includes Coronary angioplasty with stent; Dental surgery; Colonoscopy (08/04/2016); pacemaker placement (06/04/2010); vascular surgery; colectomy (Right, 08/26/2016); Toe amputation (Right, 09/12/2017); Toe amputation (Right, 01/09/2018); Cardiac catheterization; Cardiac defibrillator placement (06/04/2010); Coronary angioplasty; Cardiac catheterization (07/14/2017); and Cardiac catheterization (11/20/2018). Allergies: Allergies   Allergen Reactions    Pcn [Penicillins] Hives    Fentanyl Itching     Home Medications:   Prior to Admission medications    Medication Sig Start Date End Date Taking?  Authorizing Provider   ondansetron (ZOFRAN ODT) 4 MG disintegrating tablet Take 1 tablet by mouth every 8 hours as needed for Nausea or Vomiting 10/22/20  Yes Richmond Ped, PA-C   dicyclomine (BENTYL) 10 MG capsule Take 2 capsules by mouth 4 times daily (before meals and nightly) 10/22/20  Yes Richmond Ped, PA-C   torsemide (DEMADEX) 20 MG tablet Take 5 tablets by mouth 2 times daily 9/21/20   Angelito úNñez MD   pregabalin (LYRICA) 50 MG capsule Take 1 capsule by mouth 3 times daily for 30 days. 9/21/20 10/21/20  Angelito Núñez MD   albuterol sulfate HFA (VENTOLIN HFA) 108 (90 Base) MCG/ACT inhaler Inhale 2 puffs into the lungs every 4 hours as needed for Wheezing 8/14/20   Jane Libman, MD   doxazosin (CARDURA) 2 MG tablet TAKE 1 TABLET BY MOUTH EVERY DAY IN THE EVENING 6/15/20   Angelito Núñez MD   Calcium Alginate (ALGICELL CALCIUM DRESSING 4\"X8) MISC Apply 1 Device topically daily 3/20/20   Ana Ortiz MD   [facility] COREY WOUND DRESSING MATRIX Apply topically Apply to left daily and cover with gauze 3/19/20   Vianey Arellano MD   amLODIPine (NORVASC) 10 MG tablet Take 10 mg by mouth daily    Historical Provider, MD   TRULICITY 3.65 UY/5.5UB SOPN once a week  1/5/20   Historical Provider, MD   BD ULTRA-FINE PEN NEEDLES 29G X 12.7MM MISC  12/29/19   Historical Provider, MD   carvedilol (COREG) 25 MG tablet TAKE 1 TABLET BY MOUTH TWICE A DAY WITH MEALS 10/24/19   Angelito Núñez MD   losartan (COZAAR) 100 MG tablet Take 1 tablet by mouth every evening 7/31/19   Angelito Núñez MD   Blood Pressure KIT BID for record down in log book 3/12/19   Jovanny Ponce MD   clopidogrel (PLAVIX) 75 MG tablet Take 1 tablet by mouth daily 7/18/17   Lindy Benton MD   gabapentin (NEURONTIN) 600 MG tablet Take 600 mg by mouth 3 times daily. Historical Provider, MD   insulin glargine (LANTUS SOLOSTAR) 100 UNIT/ML injection pen Inject 40 Units into the skin nightly  Patient taking differently: Inject 44 Units into the skin nightly  9/30/16   Dona Rose MD     FHx: family history includes Cancer in his brother, father, and son; Diabetes in his brother, mother, and sister; Early Death (age of onset: 62) in his sister;  Heart Disease in his brother and sister; High Blood Pressure in his brother, brother, and mother; Neuropathy in his sister; Other in his sister; Stroke in his mother; Vision Loss in his mother. SHx:   Social History     Socioeconomic History    Marital status:       Spouse name: None    Number of children: None    Years of education: None    Highest education level: None   Occupational History    None   Social Needs    Financial resource strain: None    Food insecurity     Worry: None     Inability: None    Transportation needs     Medical: None     Non-medical: None   Tobacco Use    Smoking status: Former Smoker     Packs/day: 0.00     Years: 36.00     Pack years: 0.00     Types: Cigars     Start date: 1/1/1980    Smokeless tobacco: Never Used   Substance and Sexual Activity    Alcohol use: No     Frequency: Never    Drug use: Yes     Types: Marijuana    Sexual activity: Never   Lifestyle    Physical activity     Days per week: None     Minutes per session: None    Stress: None   Relationships    Social connections     Talks on phone: None     Gets together: None     Attends [employer] service: None     Active member of club or organization: None     Attends meetings of clubs or organizations: None     Relationship status: None    Intimate partner violence     Fear of current or ex partner: None     Emotionally abused: None     Physically abused: None     Forced sexual activity: None   Other Topics Concern    None   Social History Narrative    None       Medications:   Medications:    Infusions:    sodium chloride Stopped (10/22/20 0937)     PRN Meds:        Electronically signed by Caresse Bumpers, DO on 10/22/2020 at 10:25 AM

## 2020-10-22 NOTE — ED NOTES
Pt O2 sat noted to be 84% on room air, Lela HOPPER notified.  2 L O2 placed on pt, pt up to 95%     Saundra Du, RN  10/22/20 7996

## 2020-10-22 NOTE — CARE COORDINATION
.CM met with pt for d/c planning. Introduced self and updated white board. Pt lives with his daughter and requires assistance with ADL's. His daughter provides his transportation. He has a PCP, has insurance, and is able to afford medication. Pt has a walker, hoveround,w/c/shower seat, and a hospital bed. He states that he has an aide for 7 hrs 7 days a week provided by [facility]. Pt denies any new needs at this time. D/c plan is home with daughter and [facility]. Notify CM if any d/c needs arise.   TE

## 2020-10-22 NOTE — ED NOTES
Patient's oxygen level down to 83% on room air. Placed on 2 liters of oxygen at this time. Up to 92-93% on 2 liters.  Patient does not wear oxygen at home     [facility]  10/22/20 9993

## 2020-10-22 NOTE — ED NOTES
Bed: ED-15  Expected date:   Expected time:   Means of arrival:   Comments:  abd pain EMS     Adeline Solomon RN  10/22/20 1721

## 2020-10-22 NOTE — ED PROVIDER NOTES
All other review of systems are negative  See HPI and nursing notes for additional information         PAST MEDICAL & SURGICAL HISTORY    Past Medical History:   Diagnosis Date    Acid reflux     Acute MI ([facility] 2004, 2008    Arthritis     Back    Broken teeth     Upper Front    CAD (coronary artery disease)     Sees Dr. Kusum Colmenares [facility])     per old chart    CHF (congestive heart failure) ([facility]     Chronic back pain     Chronic kidney disease     STAGE 3 KIDNEY FAILURE- \"from my diabetes- do not follow with any one- have seen Dr North Smoker in the past\"    Diabetes mellitus ([facility] Dx 1965    per old chart pt has been diabetic since age 13    Diabetic neuropathy ([facility]     \"in my feet\"    H/O cardiovascular stress test 08/25/2016    H/O Doppler ultrasound 09/27/2018    Moderate disease of the right lower extremity with an JALEN of 0.72.   Moderate to severe disease of the left lower extremity with an JALEN of 0.55.   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    Neuropathy     both feet    neuropathy     PAD (peripheral artery disease) ([facility] 09/27/2018    PVD (peripheral vascular disease) ([facility]     Sick sinus syndrome ([facility]     Sleep apnea     \"sleep study 3 yrs ago- could not tolerate the cpap made me too dry\"    Spinal stenosis     Teeth missing     Upper And Lower    Type 2 diabetes mellitus without complication [facility])      Past Surgical History:   Procedure Laterality Date    CARDIAC CATHETERIZATION      per old chart done 10/2014    CARDIAC CATHETERIZATION  07/14/2017    with angiography of leg    CARDIAC CATHETERIZATION  11/20/2018    PATENT STENTS OF ALL THREE MAJOR VESSELS    CARDIAC DEFIBRILLATOR PLACEMENT  06/04/2010    [employer] [address] DR Defibrillator Implanted    COLECTOMY Right 08/26/2016    laparascopic; robotic assisted    COLONOSCOPY  08/04/2016    CORONARY ANGIOPLASTY      \"15 Heart Stents\"    CORONARY ANGIOPLASTY WITH STENT PLACEMENT      per old chart had angio with stent to circumflex and obtuse marginal artery at [facility] 5/2010( old chart also gives hx of stent placement done 2000,2004 and 2005)   [address]      Teeth Extracted In Past    PACEMAKER PLACEMENT  06/04/2010    [employer] Secura DR Defibrillator Implanted    TOE AMPUTATION Right 09/12/2017    Rt 3rd toe    TOE AMPUTATION Right 01/09/2018     Right 5th toe amputation and Toenails trimmed left 2,3,4 and 5th toes    VASCULAR SURGERY      per old chart had balloon angioplasty right superfical femoral artery,right popliteal artery,,right ant.tibial artery, right tibioperoneal trunk, and right post.tibial artery wna stent placement right popliteal artery and superfical femoral artery 7/2012       CURRENT MEDICATIONS    Current Outpatient Rx   Medication Sig Dispense Refill    ondansetron (ZOFRAN ODT) 4 MG disintegrating tablet Take 1 tablet by mouth every 8 hours as needed for Nausea or Vomiting 10 tablet 0    dicyclomine (BENTYL) 10 MG capsule Take 2 capsules by mouth 4 times daily (before meals and nightly) 30 capsule 0    losartan (COZAAR) 100 MG tablet Take 1 tablet by mouth daily 90 tablet 3    torsemide (DEMADEX) 20 MG tablet Take 5 tablets by mouth 2 times daily 60 tablet 3    doxazosin (CARDURA) 4 MG tablet Take 1 tablet by mouth 2 times daily 60 tablet 3    pregabalin (LYRICA) 50 MG capsule Take 1 capsule by mouth 3 times daily for 30 days.  30 capsule 0    metOLazone (ZAROXOLYN) 5 MG tablet TAKE 1 TABLET BY MOUTH EVERY DAY 90 tablet 3    albuterol sulfate HFA (VENTOLIN HFA) 108 (90 Base) MCG/ACT inhaler Inhale 2 puffs into the lungs every 4 hours as needed for Wheezing 1 Inhaler 3    doxazosin (CARDURA) 2 MG tablet TAKE 1 TABLET BY MOUTH EVERY DAY IN THE EVENING 90 tablet 1    Calcium Alginate (ALGICELL CALCIUM DRESSING 4\"X8) MISC Apply 1 Device topically daily 30 each 3    PROMOGRAN COREY WOUND DRESSING MATRIX Apply topically Apply to left daily and cover with gauze 1 Package 3    amLODIPine (NORVASC) 10 MG tablet Take 10 mg by mouth daily      TRULICITY 7.35 CY/4.9TA SOPN once a week       BD ULTRA-FINE PEN NEEDLES 29G X 12.7MM MISC       carvedilol (COREG) 25 MG tablet TAKE 1 TABLET BY MOUTH TWICE A DAY WITH MEALS 180 tablet 3    losartan (COZAAR) 100 MG tablet Take 1 tablet by mouth every evening 90 tablet 3    Blood Pressure KIT BID for record down in log book 1 kit 0    clopidogrel (PLAVIX) 75 MG tablet Take 1 tablet by mouth daily 30 tablet 11    gabapentin (NEURONTIN) 600 MG tablet Take 600 mg by mouth 3 times daily.  insulin glargine (LANTUS SOLOSTAR) 100 UNIT/ML injection pen Inject 40 Units into the skin nightly (Patient taking differently: Inject 44 Units into the skin nightly ) 5 Pen 3       ALLERGIES    Allergies   Allergen Reactions    Pcn [Penicillins] Hives    Fentanyl Itching       SOCIAL & FAMILY HISTORY    Social History     Socioeconomic History    Marital status:       Spouse name: None    Number of children: None    Years of education: None    Highest education level: None   Occupational History    None   Social Needs    Financial resource strain: None    Food insecurity     Worry: None     Inability: None    Transportation needs     Medical: None     Non-medical: None   Tobacco Use    Smoking status: Former Smoker     Packs/day: 0.00     Years: 36.00     Pack years: 0.00     Types: Cigars     Start date: 1/1/1980    Smokeless tobacco: Never Used   Substance and Sexual Activity    Alcohol use: No     Frequency: Never    Drug use: Yes     Types: Marijuana  Sexual activity: Never   Lifestyle    Physical activity     Days per week: None     Minutes per session: None    Stress: None   Relationships    Social connections     Talks on phone: None     Gets together: None     Attends Anglican service: None     Active member of club or organization: None     Attends meetings of clubs or organizations: None     Relationship status: None    Intimate partner violence     Fear of current or ex partner: None     Emotionally abused: None     Physically abused: None     Forced sexual activity: None   Other Topics Concern    None   Social History Narrative    None     Family History   Problem Relation Age of Onset    Diabetes Mother     Stroke Mother     High Blood Pressure Mother     Vision Loss Mother     Cancer Father         Prostate Cancer    Diabetes Sister     Neuropathy Sister     Other Sister         \"Breathing Problems\"    Heart Disease Sister     Early Death Sister 62        Heart Complications    Cancer Brother         \"Stomach Cancer\"    High Blood Pressure Brother     Diabetes Brother     Heart Disease Brother     High Blood Pressure Brother     Cancer Son         \"Testicle Cancer\"       PHYSICAL EXAM    VITAL SIGNS: BP (!) 203/88   Pulse 67   Temp 98.5 °F (36.9 °C) (Oral)   Resp 17   Ht 6' (1.829 m)   Wt 275 lb (124.7 kg)   SpO2 95%   BMI 37.30 kg/m²    Constitutional:  Well developed, well nourished, no acute distress   HENT:  Atraumatic, moist mucus membranes  Neck/Lymphatics: supple, no JVD, no swollen nodes  Respiratory:  Lungs Clear, no retractions, no wheezing, rhonchi, rales  Cardiovascular:  Rate regular, regular Rhythm, no murmurs/rubs/gallops. No carotid bruits or murmurs heard in carotids. No chest wall tenderness palpation. Vascular: Radial pulses and DP pulses 2+ equal bilaterally  GI:  Soft, normal bowel sounds, no palpable pulsatile abdominal mass, no distention, no discoloration, no guarding or rigidity.   There is 40 U/L    AST 10 (L) 15 - 37 IU/L    Alkaline Phosphatase 62 40 - 128 IU/L    GFR Non- 46 (L) >60 mL/min/1.73m2    GFR  56 (L) >60 mL/min/1.73m2    Anion Gap 10 4 - 16   Troponin   Result Value Ref Range    Troponin T 0.014 (H) <0.01 NG/ML   Lipase   Result Value Ref Range    Lipase 20 13 - 60 IU/L   Lactic Acid, Plasma   Result Value Ref Range    Lactate 1.2 0.4 - 2.0 mMOL/L   Urinalysis   Result Value Ref Range    Color, UA YELLOW YELLOW    Clarity, UA CLEAR CLEAR    Glucose, Urine NEGATIVE NEGATIVE MG/DL    Bilirubin Urine NEGATIVE NEGATIVE MG/DL    Ketones, Urine MODERATE (A) NEGATIVE MG/DL    Specific Gravity, UA 1.011 1.001 - 1.035    Blood, Urine NEGATIVE NEGATIVE    pH, Urine 7.0 5.0 - 8.0    Protein,  (A) NEGATIVE MG/DL    Urobilinogen, Urine 1 0.2 - 1.0 MG/DL    Nitrite Urine, Quantitative NEGATIVE NEGATIVE    Leukocyte Esterase, Urine NEGATIVE NEGATIVE    RBC, UA 1 0 - 3 /HPF    WBC, UA 1 0 - 2 /HPF    Bacteria, UA NEGATIVE NEGATIVE /HPF    Squam Epithel, UA <1 /HPF    Mucus, UA RARE (A) NEGATIVE HPF    Trichomonas, UA NONE SEEN NONE SEEN /HPF   EKG 12 Lead   Result Value Ref Range    Ventricular Rate 76 BPM    Atrial Rate 76 BPM    P-R Interval 182 ms    QRS Duration 96 ms    Q-T Interval 416 ms    QTc Calculation (Bazett) 468 ms    P Axis 82 degrees    R Axis 27 degrees    T Axis 167 degrees    Diagnosis       Normal sinus rhythm  Cannot rule out Anterior infarct , age undetermined  Abnormal ECG  When compared with ECG of 12-AUG-2020 17:12,  Sinus rhythm has replaced Electronic atrial pacemaker  T wave inversion no longer evident in Inferior leads           EKG: EKG Interpretation  Please see ED physician's note for EKG interpretation- Dr. Marlen Gregory    RADIOLOGY/PROCEDURES    CTA Coralyn Knock   Preliminary Result   No evidence of an acute aortic syndrome. No central/saddle pulmonary embolism.       Moderate interstitial and alveolar pulmonary edema, presumably cardiogenic   given cardiomegaly. No acute abdominopelvic findings. Cholelithiasis. Urinary bladder wall thickening is favored to be due to underdistention. XR CHEST PORTABLE   Preliminary Result   Cardiomegaly with pulmonary vascular congestion. ED COURSE & MEDICAL DECISION MAKING       Vital signs and nursing notes reviewed during ED course. Patient care and presentation staffed with and seen in conjunction with supervising physician, Dr. Susana Reece. Patient presents as above. Patient placed on telemetry monitoring as well as continuous pulse oximetry monitoring. While in the ED today, labs, imaging, and EKG were obtained. For EKG interpretation- please see ED physician's note. Labs reveal creatinine to be at baseline at 1.5, glucose of 141 and not in DKA, troponin is detectable at 0.014 but improved from prior on chart review. Lactic acid is normal. Chest xray obtained today and reveals cardiomegaly with pulmonary vascular congestion. CTA chest abdomen pelvis obtained today to rule out dissection reveals no evidence of acute aortic syndrome, no PE, no acute abdominal pelvic findings. There is cholelithiasis but I do not suspect acute cholecystitis as bilirubin and liver enzymes are without acute abnormalities and patient has no right upper quadrant tenderness to palpation. Patient was very hypertensive on arrival to the ED and was treated with IV Zofran, IV morphine, and IV labetalol with improvement in his hypertension as well as resolution of pain and nausea. After being treated with morphine he did become slightly hypoxemic and required oxygen supplementation via nasal cannula. Patient will be p.o. challenged with ice chips. He has been able to be weaned off of oxygen back to room air which is his baseline. Patient still hypertensive and therefore will be given his morning medications for his blood pressure.   3-hour repeat troponin will be obtained to ensure it remains equal or lower than initial troponin. Abdominal exam and repeat abdominal exam without peritoneal signs. His symptoms have completely resolved at this time. Patient feels improved and would like to go home. If patient successfully p.o. challenges, troponin is equal or better than earlier, and he is able to remain off of oxygen with a controlled blood pressure then he will be discharged home with prescriptions for Bentyl and Zofran. We discussed medications. Patient reports his chest discomfort he had earlier was simply because of his abdominal pain which is similar to what is happened in the past for him. My shift has come to an end.    6:00 AM EDT I have signed out [facility] Emergency Department care to Garth . We discussed the history, physical exam, completed/pending test results (if obtained) and current treatment plan. Please refer to his/her chart for the patients further details, remaining Emergency Department course, final disposition and diagnosis. Clinical  IMPRESSION    1. Abdominal pain, unspecified abdominal location    2. Non-intractable vomiting with nausea, unspecified vomiting type    3. Chest pain, unspecified type    4. Hypertension, unspecified type          Comment: Please note this report has been produced using speech recognition software and may contain errors related to that system including errors in grammar, punctuation, and spelling, as well as words and phrases that may be inappropriate. If there are any questions or concerns please feel free to contact the dictating provider for clarification.         Richard Caba PA-C  10/23/20 [id number]

## 2020-10-22 NOTE — ED NOTES
Xray at bedside     Carola Izaguirre, [facility]  10/22/20 0109

## 2020-10-22 NOTE — ED PROVIDER NOTES
asymmetry, or calf / thigh tenderness bilaterally. No cyanosis. No cool or pale-appearing limb. Distal cap refill and pulses intact bilateral upper and lower extremities  Bilateral upper and lower extremity ROM intact without pain or obvious deficit  Integument:  Warm, Dry  Neurologic: Alert & oriented , No focal deficits noted. Cranial nerves II through XII grossly intact. Normal gross motor coordination & motor strength bilateral upper and lower extremities  Sensation intact.   Psychiatric:  Affect normal, Mood normal.       I have reviewed and interpreted all of the currently available lab/imaging results from this visit (if applicable):  LABS:  Results for orders placed or performed during the hospital encounter of 10/22/20   CBC Auto Differential   Result Value Ref Range    WBC 8.6 4.0 - 10.5 K/CU MM    RBC 3.93 (L) 4.6 - 6.2 M/CU MM    Hemoglobin 11.9 (L) 13.5 - 18.0 GM/DL    Hematocrit 37.9 (L) 42 - 52 %    MCV 96.4 78 - 100 FL    MCH 30.3 27 - 31 PG    MCHC 31.4 (L) 32.0 - 36.0 %    RDW 12.9 11.7 - 14.9 %    Platelets 651 076 - 343 K/CU MM    MPV 9.7 7.5 - 11.1 FL    Differential Type AUTOMATED DIFFERENTIAL     Segs Relative 80.2 (H) 36 - 66 %    Lymphocytes % 13.9 (L) 24 - 44 %    Monocytes % 4.2 (H) 0 - 4 %    Eosinophils % 1.1 0 - 3 %    Basophils % 0.4 0 - 1 %    Segs Absolute 6.9 K/CU MM    Lymphocytes Absolute 1.2 K/CU MM    Monocytes Absolute 0.4 K/CU MM    Eosinophils Absolute 0.1 K/CU MM    Basophils Absolute 0.0 K/CU MM    Nucleated RBC % 0.0 %    Total Nucleated RBC 0.0 K/CU MM    Total Immature Neutrophil 0.02 K/CU MM    Immature Neutrophil % 0.2 0 - 0.43 %   Comprehensive Metabolic Panel w/ Reflex to MG   Result Value Ref Range    Sodium 138 135 - 145 MMOL/L    Potassium 4.4 3.5 - 5.1 MMOL/L    Chloride 104 99 - 110 mMol/L    CO2 24 21 - 32 MMOL/L    BUN 13 6 - 23 MG/DL    CREATININE 1.5 (H) 0.9 - 1.3 MG/DL    Glucose 141 (H) 70 - 99 MG/DL    Calcium 8.6 8.3 - 10.6 MG/DL    Alb 3.3 (L) 3.4 - 5.0 GM/DL    Total Protein 6.8 6.4 - 8.2 GM/DL    Total Bilirubin 0.6 0.0 - 1.0 MG/DL    ALT 6 (L) 10 - 40 U/L    AST 10 (L) 15 - 37 IU/L    Alkaline Phosphatase 62 40 - 128 IU/L    GFR Non- 46 (L) >60 mL/min/1.73m2    GFR  56 (L) >60 mL/min/1.73m2    Anion Gap 10 4 - 16   Troponin   Result Value Ref Range    Troponin T 0.014 (H) <0.01 NG/ML   Lipase   Result Value Ref Range    Lipase 20 13 - 60 IU/L   Lactic Acid, Plasma   Result Value Ref Range    Lactate 1.2 0.4 - 2.0 mMOL/L   Urinalysis   Result Value Ref Range    Color, UA YELLOW YELLOW    Clarity, UA CLEAR CLEAR    Glucose, Urine NEGATIVE NEGATIVE MG/DL    Bilirubin Urine NEGATIVE NEGATIVE MG/DL    Ketones, Urine MODERATE (A) NEGATIVE MG/DL    Specific Gravity, UA 1.011 1.001 - 1.035    Blood, Urine NEGATIVE NEGATIVE    pH, Urine 7.0 5.0 - 8.0    Protein,  (A) NEGATIVE MG/DL    Urobilinogen, Urine 1 0.2 - 1.0 MG/DL    Nitrite Urine, Quantitative NEGATIVE NEGATIVE    Leukocyte Esterase, Urine NEGATIVE NEGATIVE    RBC, UA 1 0 - 3 /HPF    WBC, UA 1 0 - 2 /HPF    Bacteria, UA NEGATIVE NEGATIVE /HPF    Squam Epithel, UA <1 /HPF    Mucus, UA RARE (A) NEGATIVE HPF    Trichomonas, UA NONE SEEN NONE SEEN /HPF   Troponin   Result Value Ref Range    Troponin T 0.012 (H) <0.01 NG/ML   EKG 12 Lead   Result Value Ref Range    Ventricular Rate 76 BPM    Atrial Rate 76 BPM    P-R Interval 182 ms    QRS Duration 96 ms    Q-T Interval 416 ms    QTc Calculation (Bazett) 468 ms    P Axis 82 degrees    R Axis 27 degrees    T Axis 167 degrees    Diagnosis       Normal sinus rhythm  Cannot rule out Anterior infarct , age undetermined  Abnormal ECG  When compared with ECG of 12-AUG-2020 17:12,  Sinus rhythm has replaced Electronic atrial pacemaker  T wave inversion no longer evident in Inferior leads           IMAGING:  CTA CHEST ABDOMEN PELVIS W CONTRAST   Preliminary Result   No evidence of an acute aortic syndrome.       No central/saddle

## 2020-10-22 NOTE — ED TRIAGE NOTES
Pt presents to the ED via EMS with complaints of abdominal pain. Pt reports that the pain started on Monday, and rates the pain 8/10. Pt also reports emesis. Pt states that the pain is mostly in upper right quadrant.

## 2020-10-22 NOTE — ED NOTES
Report called to [facility] RN at this time     Shahla Covarrubias RN  10/22/20 4432

## 2020-10-22 NOTE — ED PROVIDER NOTES
I independently examined and evaluated Hortensia Villalba. ED course: 24-year-old male presents emergency department with chief complaint of chest pain, abdominal pain. Chest pain is atypical in nature. Patient was hypertensive in the emergency department. Patient was given labetalol. CT of the chest, abdomen and pelvis was negative for dissection. Lab work is at patient's baseline. Patient was observed in the emergency department. Patient care was turned over at 0600. Anticipate discharged home. 1. Abdominal pain, unspecified abdominal location    2. Non-intractable vomiting with nausea, unspecified vomiting type    3. Chest pain, unspecified type    4. Hypertension, unspecified type        EKG Interpretation    Interpreted by emergency department physician    Rhythm: normal sinus   Rate: normal  Axis: normal  Ectopy: none  Conduction: normal  ST Segments: no acute change  T Waves: no acute change  Q Waves: none    Clinical Impression: no acute changes    Jeff Blackwood     All diagnostic, treatment, and disposition decisions were made by myself in conjunction with the advanced practice provider. For all further details of the patient's emergency department visit, please see the advanced practice provider's documentation. Comment: Please note this report has been produced using speech recognition software and may contain errors related to that system including errors in grammar, punctuation, and spelling, as well as words and phrases that may be inappropriate. If there are any questions or concerns please feel free to contact the dictating provider for clarification.        Jeff Blackwood,   10/22/20 6427

## 2020-10-23 LAB
ANION GAP SERPL CALCULATED.3IONS-SCNC: 8 MMOL/L (ref 4–16)
BASOPHILS ABSOLUTE: 0 K/CU MM
BASOPHILS RELATIVE PERCENT: 0.4 % (ref 0–1)
BUN BLDV-MCNC: 15 MG/DL (ref 6–23)
CALCIUM SERPL-MCNC: 8.5 MG/DL (ref 8.3–10.6)
CHLORIDE BLD-SCNC: 101 MMOL/L (ref 99–110)
CHOLESTEROL: 113 MG/DL
CO2: 31 MMOL/L (ref 21–32)
CREAT SERPL-MCNC: 1.9 MG/DL (ref 0.9–1.3)
DIFFERENTIAL TYPE: ABNORMAL
EOSINOPHILS ABSOLUTE: 0.1 K/CU MM
EOSINOPHILS RELATIVE PERCENT: 1.8 % (ref 0–3)
GFR AFRICAN AMERICAN: 43 ML/MIN/1.73M2
GFR NON-AFRICAN AMERICAN: 35 ML/MIN/1.73M2
GLUCOSE BLD-MCNC: 122 MG/DL (ref 70–99)
GLUCOSE BLD-MCNC: 139 MG/DL (ref 70–99)
GLUCOSE BLD-MCNC: 162 MG/DL (ref 70–99)
GLUCOSE BLD-MCNC: 83 MG/DL (ref 70–99)
GLUCOSE BLD-MCNC: 86 MG/DL (ref 70–99)
HCT VFR BLD CALC: 35.3 % (ref 42–52)
HDLC SERPL-MCNC: 26 MG/DL
HEMOGLOBIN: 11.2 GM/DL (ref 13.5–18)
IMMATURE NEUTROPHIL %: 0.2 % (ref 0–0.43)
LDL CHOLESTEROL DIRECT: 64 MG/DL
LEGIONELLA URINARY AG: NEGATIVE
LYMPHOCYTES ABSOLUTE: 1.4 K/CU MM
LYMPHOCYTES RELATIVE PERCENT: 25 % (ref 24–44)
MAGNESIUM: 1.9 MG/DL (ref 1.8–2.4)
MCH RBC QN AUTO: 30.2 PG (ref 27–31)
MCHC RBC AUTO-ENTMCNC: 31.7 % (ref 32–36)
MCV RBC AUTO: 95.1 FL (ref 78–100)
MONOCYTES ABSOLUTE: 0.3 K/CU MM
MONOCYTES RELATIVE PERCENT: 6.1 % (ref 0–4)
NUCLEATED RBC %: 0 %
PDW BLD-RTO: 12.8 % (ref 11.7–14.9)
PLATELET # BLD: 221 K/CU MM (ref 140–440)
PMV BLD AUTO: 9.7 FL (ref 7.5–11.1)
POTASSIUM SERPL-SCNC: 4 MMOL/L (ref 3.5–5.1)
PRO-BNP: 2276 PG/ML
RBC # BLD: 3.71 M/CU MM (ref 4.6–6.2)
SEGMENTED NEUTROPHILS ABSOLUTE COUNT: 3.7 K/CU MM
SEGMENTED NEUTROPHILS RELATIVE PERCENT: 66.5 % (ref 36–66)
SODIUM BLD-SCNC: 140 MMOL/L (ref 135–145)
STREP PNEUMONIAE ANTIGEN: NORMAL
TOTAL IMMATURE NEUTOROPHIL: 0.01 K/CU MM
TOTAL NUCLEATED RBC: 0 K/CU MM
TOTAL RETICULOCYTE COUNT: 0.09 K/CU MM
TRIGL SERPL-MCNC: 119 MG/DL
TSH HIGH SENSITIVITY: 0.87 UIU/ML (ref 0.27–4.2)
WBC # BLD: 5.6 K/CU MM (ref 4–10.5)

## 2020-10-23 PROCEDURE — 2580000003 HC RX 258: Performed by: STUDENT IN AN ORGANIZED HEALTH CARE EDUCATION/TRAINING PROGRAM

## 2020-10-23 PROCEDURE — 85025 COMPLETE CBC W/AUTO DIFF WBC: CPT

## 2020-10-23 PROCEDURE — 1200000000 HC SEMI PRIVATE

## 2020-10-23 PROCEDURE — 80061 LIPID PANEL: CPT

## 2020-10-23 PROCEDURE — 2140000000 HC CCU INTERMEDIATE R&B

## 2020-10-23 PROCEDURE — 83036 HEMOGLOBIN GLYCOSYLATED A1C: CPT

## 2020-10-23 PROCEDURE — 6370000000 HC RX 637 (ALT 250 FOR IP): Performed by: NURSE PRACTITIONER

## 2020-10-23 PROCEDURE — 6370000000 HC RX 637 (ALT 250 FOR IP): Performed by: STUDENT IN AN ORGANIZED HEALTH CARE EDUCATION/TRAINING PROGRAM

## 2020-10-23 PROCEDURE — 6360000002 HC RX W HCPCS: Performed by: STUDENT IN AN ORGANIZED HEALTH CARE EDUCATION/TRAINING PROGRAM

## 2020-10-23 PROCEDURE — 36415 COLL VENOUS BLD VENIPUNCTURE: CPT

## 2020-10-23 PROCEDURE — 82962 GLUCOSE BLOOD TEST: CPT

## 2020-10-23 PROCEDURE — 80048 BASIC METABOLIC PNL TOTAL CA: CPT

## 2020-10-23 PROCEDURE — 84443 ASSAY THYROID STIM HORMONE: CPT

## 2020-10-23 PROCEDURE — 83735 ASSAY OF MAGNESIUM: CPT

## 2020-10-23 PROCEDURE — 51798 US URINE CAPACITY MEASURE: CPT

## 2020-10-23 PROCEDURE — 83721 ASSAY OF BLOOD LIPOPROTEIN: CPT

## 2020-10-23 PROCEDURE — 94761 N-INVAS EAR/PLS OXIMETRY MLT: CPT

## 2020-10-23 PROCEDURE — 83880 ASSAY OF NATRIURETIC PEPTIDE: CPT

## 2020-10-23 RX ORDER — LOPERAMIDE HYDROCHLORIDE 2 MG/1
2 CAPSULE ORAL 4 TIMES DAILY PRN
Status: DISCONTINUED | OUTPATIENT
Start: 2020-10-23 | End: 2020-10-23

## 2020-10-23 RX ORDER — HYDRALAZINE HYDROCHLORIDE 10 MG/1
10 TABLET, FILM COATED ORAL EVERY 8 HOURS SCHEDULED
Status: DISCONTINUED | OUTPATIENT
Start: 2020-10-23 | End: 2020-10-24 | Stop reason: HOSPADM

## 2020-10-23 RX ORDER — LOPERAMIDE HYDROCHLORIDE 2 MG/1
2 CAPSULE ORAL 4 TIMES DAILY PRN
Status: DISCONTINUED | OUTPATIENT
Start: 2020-10-23 | End: 2020-10-24 | Stop reason: HOSPADM

## 2020-10-23 RX ORDER — NICOTINE POLACRILEX 4 MG
15 LOZENGE BUCCAL PRN
Status: DISCONTINUED | OUTPATIENT
Start: 2020-10-23 | End: 2020-10-24 | Stop reason: SDUPTHER

## 2020-10-23 RX ORDER — LOSARTAN POTASSIUM 100 MG/1
100 TABLET ORAL EVERY EVENING
Status: DISCONTINUED | OUTPATIENT
Start: 2020-10-24 | End: 2020-10-23

## 2020-10-23 RX ORDER — DEXTROSE MONOHYDRATE 25 G/50ML
12.5 INJECTION, SOLUTION INTRAVENOUS PRN
Status: DISCONTINUED | OUTPATIENT
Start: 2020-10-23 | End: 2020-10-24 | Stop reason: SDUPTHER

## 2020-10-23 RX ORDER — DEXTROSE MONOHYDRATE 50 MG/ML
100 INJECTION, SOLUTION INTRAVENOUS PRN
Status: DISCONTINUED | OUTPATIENT
Start: 2020-10-23 | End: 2020-10-24 | Stop reason: SDUPTHER

## 2020-10-23 RX ADMIN — TORSEMIDE 20 MG: 20 TABLET ORAL at 09:47

## 2020-10-23 RX ADMIN — AMLODIPINE BESYLATE 10 MG: 10 TABLET ORAL at 09:47

## 2020-10-23 RX ADMIN — GABAPENTIN 600 MG: 300 CAPSULE ORAL at 09:47

## 2020-10-23 RX ADMIN — DOXAZOSIN 2 MG: 1 TABLET ORAL at 09:47

## 2020-10-23 RX ADMIN — GABAPENTIN 600 MG: 300 CAPSULE ORAL at 21:04

## 2020-10-23 RX ADMIN — INSULIN GLARGINE 40 UNITS: 100 INJECTION, SOLUTION SUBCUTANEOUS at 21:27

## 2020-10-23 RX ADMIN — CARVEDILOL 25 MG: 25 TABLET, FILM COATED ORAL at 09:52

## 2020-10-23 RX ADMIN — SODIUM CHLORIDE, PRESERVATIVE FREE 10 ML: 5 INJECTION INTRAVENOUS at 10:00

## 2020-10-23 RX ADMIN — HYDRALAZINE HYDROCHLORIDE 10 MG: 10 TABLET, FILM COATED ORAL at 21:27

## 2020-10-23 RX ADMIN — CARVEDILOL 25 MG: 25 TABLET, FILM COATED ORAL at 17:28

## 2020-10-23 RX ADMIN — HYDRALAZINE HYDROCHLORIDE 10 MG: 10 TABLET, FILM COATED ORAL at 17:30

## 2020-10-23 RX ADMIN — CLOPIDOGREL BISULFATE 75 MG: 75 TABLET ORAL at 09:48

## 2020-10-23 RX ADMIN — ENOXAPARIN SODIUM 40 MG: 40 INJECTION SUBCUTANEOUS at 09:48

## 2020-10-23 RX ADMIN — CYCLOBENZAPRINE HYDROCHLORIDE 10 MG: 10 TABLET, FILM COATED ORAL at 17:30

## 2020-10-23 ASSESSMENT — PAIN SCALES - GENERAL
PAINLEVEL_OUTOF10: 0
PAINLEVEL_OUTOF10: 10
PAINLEVEL_OUTOF10: 0
PAINLEVEL_OUTOF10: 10

## 2020-10-23 ASSESSMENT — PAIN DESCRIPTION - DESCRIPTORS
DESCRIPTORS: STABBING;SHOOTING
DESCRIPTORS: ACHING;DISCOMFORT;PENETRATING

## 2020-10-23 ASSESSMENT — PAIN DESCRIPTION - PAIN TYPE
TYPE: NEUROPATHIC PAIN
TYPE: NEUROPATHIC PAIN

## 2020-10-23 ASSESSMENT — PAIN DESCRIPTION - ONSET
ONSET: ON-GOING
ONSET: SUDDEN

## 2020-10-23 ASSESSMENT — PAIN DESCRIPTION - LOCATION
LOCATION: FOOT
LOCATION: FOOT

## 2020-10-23 ASSESSMENT — PAIN DESCRIPTION - FREQUENCY
FREQUENCY: INTERMITTENT
FREQUENCY: INTERMITTENT

## 2020-10-23 ASSESSMENT — PAIN DESCRIPTION - ORIENTATION
ORIENTATION: RIGHT;LEFT
ORIENTATION: RIGHT

## 2020-10-23 ASSESSMENT — PAIN DESCRIPTION - DIRECTION: RADIATING_TOWARDS: KNEE

## 2020-10-23 NOTE — CONSULTS
Nutrition Education    · Verbally reviewed information with Patient  · Provided/reviewed Heart Healthy Eating Nutrition Therapy (Nutrition Care Manual), focusing on limiting sodium to 1500 mg/d. · Written educational materials provided. · Contact name and number provided. · Refer to Patient Education activity for more details.     Electronically signed by Anderson Lua RD, JESSICA on 10/23/20 at 1:22 PM EDT    Contact: [phone]

## 2020-10-23 NOTE — PLAN OF CARE
Problem: Pain:  Description: Pain management should include both nonpharmacologic and pharmacologic interventions.   Goal: Pain level will decrease  Description: Pain level will decrease  10/23/2020 1749 by Lissy Hill RN  Outcome: Ongoing  10/23/2020 1748 by Lissy Hill RN  Outcome: Ongoing  Goal: Control of acute pain  Description: Control of acute pain  10/23/2020 1749 by Lissy Hill RN  Outcome: Ongoing  10/23/2020 1748 by Lissy Hill RN  Outcome: Ongoing  Goal: Control of chronic pain  Description: Control of chronic pain  10/23/2020 1749 by Lissy Hill RN  Outcome: Ongoing  10/23/2020 1748 by Lissy Hill RN  Outcome: Ongoing     Problem: Falls - Risk of:  Goal: Will remain free from falls  Description: Will remain free from falls  10/23/2020 1749 by Lissy Hill RN  Outcome: Ongoing  10/23/2020 1748 by Lissy Hill RN  Outcome: Ongoing  Goal: Absence of physical injury  Description: Absence of physical injury  10/23/2020 1749 by Lissy Hill RN  Outcome: Ongoing  10/23/2020 1748 by Lissy Hill RN  Outcome: Ongoing

## 2020-10-23 NOTE — CONSULTS
Nephrology Service Consultation    Patient:  Elsa Bob  MRN: [id number]  Consulting physician:  No att. providers found  Reason for Consult:  NANCY on stage 3A1 CKD     History Obtained From:  patient  PCP: Betty Powell    HISTORY OF PRESENT ILLNESS:   The patient is a 79 y.o. male who was transported to [facility] ED   On 10/22. It is reported that patient had presented to the ED  With abdominal pain as well as nausea and vomiting which   The patient had been contending with for approximately 3 days. REVIEW OF SYSTEMS:  The ten point review of systems   are negative except as mentioned above. 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 No central/saddle pulmonary embolism. Moderate interstitial and alveolar pulmonary edema,   presumably cardiogenic given cardiomegaly. No acute abdominopelvic findings. Cholelithiasis. Urinary bladder wall thickening is favored to be due to underdistention.      Medications:   Scheduled Meds:   [START ON 10/24/2020] losartan  100 mg Oral QPM    amLODIPine  10 mg Oral Daily    carvedilol  25 mg Oral BID WC    clopidogrel  75 mg Oral Daily    doxazosin  2 mg Oral Daily    gabapentin  600 mg Oral BID    insulin glargine  40 Units Subcutaneous Nightly    torsemide  20 mg Oral BID    sodium chloride flush  10 mL Intravenous 2 times per day    enoxaparin  40 mg Subcutaneous Daily    polyethylene glycol  17 g Oral Daily     Continuous Infusions:   dextrose      sodium chloride Stopped (10/22/20 0937)     PRN Meds:.glucose, dextrose, glucagon (rDNA), dextrose, loperamide, sodium chloride flush, acetaminophen **OR** acetaminophen, magnesium hydroxide, promethazine **OR** ondansetron, cyclobenzaprine    Allergies:  Pcn [penicillins] and Fentanyl    Family History:       Problem Relation Age of Onset    Diabetes Mother     Stroke Mother     High Blood Pressure Mother     Vision Loss Mother     Cancer Father         Prostate Cancer    Diabetes Sister     Neuropathy Sister     Other Sister         \"Breathing Problems\"    Heart Disease Sister     Early Death Sister 62        Heart Complications    Cancer Brother         \"Stomach Cancer\"    High Blood Pressure Brother     Diabetes Brother     Heart Disease Brother     High Blood Pressure Brother     Cancer Son         \"Testicle Cancer\"     Physical Exam:    Vitals: BP (!) 146/83   Pulse 60   Temp 98 °F (36.7 °C) (Oral)   Resp 14   Ht 6' (1.829 m)   Wt 270 lb 1.6 oz (122.5 kg)   SpO2 95%   BMI 36.63 kg/m²     General appearance:  awake and verbally interactive   HEENT: Head: Normal, normocephalic, atraumatic.   Neck: supple, symmetrical, trachea midline  Cardiovascular: S1 and S2: normal / no rub  Pulmonary: diminished lung sounds bilaterally  Abdomen:  soft / non-tender   Extremities: ++ edema to bilateral lower legs     CBC:   Recent Labs     10/22/20  0410 10/23/20  0431   WBC 8.6 5.6   HGB 11.9* 11.2*    221     BMP:    Recent Labs     10/22/20  0410 10/23/20  0431    140   K 4.4 4.0    101   CO2 24 31   BUN 13 15   CREATININE 1.5* 1.9*   GLUCOSE 141* 86     Hepatic:   Recent Labs     10/22/20  0410   AST 10*   ALT 6*   BILITOT 0.6 ALKPHOS 62     Troponin: No results for input(s): TROPONINI in the last 72 hours. Mg, Phos:   Recent Labs     10/23/20  0431   MG 1.9       ABGs: No results found for: PHART, PO2ART, MBW1DGA  INR: No results for input(s): INR in the last 72 hours.   -----------------------------------------------------------------  Patient Active Problem List   Diagnosis Code    PAD (peripheral artery disease) ([facility]) I73.9    Chronic coronary artery disease I25.10    Automatic implantable cardioverter-defibrillator in situ Z95.810    Chronic combined systolic and diastolic heart failure ([facility]) I50.42    Chronic kidney disease, stage III (moderate) N18.30    Benign essential HTN I10    Mixed hyperlipidemia E78.2    Sick sinus syndrome ([facility] I49.5    Type 2 diabetes mellitus with diabetic polyneuropathy ([facility] E11.42    Spinal stenosis of lumbar region M48.061    Obesity, Class I, BMI 30-34.9 E66.9    Postoperative hypertension I97.3    S/P partial colectomy Z90.49    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    Microalbuminuria R80.9    PVD (peripheral vascular disease) ([facility]) I73.9    Limb ischemia I99.8    Necrotic toes ([facility]) I96    Toe gangrene ([facility]) I96    Diabetic foot infection ([facility]) E11.628, L08.9    Chronic kidney disease (CKD) stage G3a/A2, moderately decreased glomerular filtration rate (GFR) between 45-59 mL/min/1.73 square meter and albuminuria creatinine ratio between  mg/g N18.31    DM (diabetes mellitus) ([facility]) E11.9    Edema R60.9    ICD (implantable cardioverter-defibrillator) battery depletion Z45.02    Hyperkalemia E87.5    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[facility]) I96    Ischemia of toe I99.8    Acute kidney injury ([facility]) N17.9    Fluid overload E87.70    DM (diabetes mellitus) ([facility]) E11.9    Hypertensive emergency I16.1    Precordial pain R07.2    Acute chest pain R07.9    Unstable angina (HCC) I20.0    Chronic kidney disease (CKD) stage G3a/A3, moderately decreased glomerular filtration rate (GFR) between 45-59 mL/min/1.73 square meter and albuminuria creatinine ratio greater than 300 mg/g N18.31    Cardiomyopathy (HCC) I42.9    Hypertensive crisis I16.9    Diabetic neuropathy (HCC) E11.40    HTN (hypertension) I10    Epigastric pain R10.13    Acute on chronic heart failure with reduced ejection fraction and diastolic dysfunction (HCC) I50.43     Assessment and Recommendations     Impression   1. NANCY on stage 3A1 CKD (ATN vs. Pre-renal azotemia)  -likely etiology for NANCY including: exposure to nephrotoxic agents;   including contrast from recent CT. 2.  CMP with ICD placement  3. HTN   4.  DM   5. Anemia   6. Abdominal Pain with nausea / vomiting     PLAN  1.    -UA: negative blood / albumin: 100  -Microscopic urinalysis demonstrated bland sediment.  -additional labs: BMP qam / UPC   -bladder scan ordered to screen for urinary retention   -latest serum creatinine 1.9 with normal Na / K / CO2  -started on NS at 75 / hour   2.   -diuretics / ARB currently on hold; in context to recent contrast exposure   -on coreg at present time   -monitor: O2 saturations / urine output and volume status   -chest X-ray in am to recheck volume status   --O2 sat: 95 - 96% on room air  3. -BP trend: systolic BP's have recently been 142 - 159  -on cardura / amlodipine / coreg / hydralazine  -holding parameters placed on many of the blood pressure medications  4.   -on lantus at bedtime   5.   -latest Hb: 11.2; follow hemoglobin trend   6. -CT of abdomen / pelvis reviewed above  -patient reports improving abdominal pain since admission. Electronically signed by MICKY Soto - [facility]      Nephrology Attending Progress Note  10/23/2020 5:11 PM  Subjective:   Admit Date: 10/22/2020  PCP: Liset Gonzalez    Interval History: I have personally performed face to face diagnostic evaluation on this patient. I have personally reviewed pertinent labs and imaging and agree with the care plan above.  My additional findings are as follows:  79year old male present with weakness and sob with abd pain. Pt with htn and Dm2 with CHF possible CRS.  In setting ckd 3 and arf wanted renal to see in setting diuresis    Objective:   Vitals: BP (!) 149/66   Pulse 62   Temp 98.4 °F (36.9 °C) (Oral)   Resp 18   Ht 6' (1.829 m)   Wt 270 lb 1.6 oz (122.5 kg)   SpO2 95%   BMI 36.63 kg/m²   Weak awake  Soft nt  +edema    Assessment and Plan:  IMP:  As stated above    Plan     1 arf in setting diuresis and give gentle diuresis as also recent contrast  2 plan restart diuresis 1-2 days  3 ace arb held  3 bp monitor  5 ssi monitor  willl follow and no distress today           Electronically signed by Opal Arguelles MD on 10/23/2020 at 5:11 PM

## 2020-10-23 NOTE — PLAN OF CARE
Problem: Pain:  Goal: Pain level will decrease  Description: Pain level will decrease  10/22/2020 2058 by Beverly Rodriguez RN  Outcome: Ongoing  10/22/2020 1343 by Elvia Kennedy RN  Outcome: Ongoing  Goal: Control of acute pain  Description: Control of acute pain  10/22/2020 2058 by Beverly Rodriguez RN  Outcome: Ongoing  10/22/2020 1343 by Elvia Kennedy RN  Outcome: Ongoing  Goal: Control of chronic pain  Description: Control of chronic pain  10/22/2020 2058 by Beverly Rodriguez RN  Outcome: Ongoing  10/22/2020 1343 by Elvia Kennedy RN  Outcome: Ongoing

## 2020-10-23 NOTE — PROGRESS NOTES
hours.  LIVER PROFILE:  Recent Labs     10/22/20  0410   AST 10*   ALT 6*   BILITOT 0.6   ALKPHOS 62       Imaging Last 24 Hours:  Xr Chest Portable    Result Date: 10/22/2020  EXAMINATION: ONE XRAY VIEW OF THE CHEST 10/22/2020 3:50 am COMPARISON: Chest radiograph August 12, 2020. HISTORY: ORDERING SYSTEM PROVIDED HISTORY: Chest pain TECHNOLOGIST PROVIDED HISTORY: Reason for exam:->Chest pain Reason for Exam: chest pain Acuity: Unknown Type of Exam: Initial FINDINGS: Cardiomegaly is noted. A left-sided intracardiac device is seen. Pulmonary vascular congestion is noted. There is no focal consolidation, pleural effusion, or pneumothorax. Cardiomegaly with pulmonary vascular congestion. Cta Chest Abdomen Pelvis W Contrast    Result Date: 10/23/2020  EXAMINATION: CTA OF THE CHEST, ABDOMEN AND PELVIS WITH CONTRAST, 10/22/2020 5:24 am TECHNIQUE: CTA of the chest, abdomen and pelvis was performed after the administration of intravenous contrast.  Multiplanar reformatted images are provided for review. MIP images are provided for review. Dose modulation, iterative reconstruction, and/or weight based adjustment of the mA/kV was utilized to reduce the radiation dose to as low as reasonably achievable. COMPARISON: CT abdomen and pelvis 08/12/2020; CT chest 11/17/2016 HISTORY: ORDERING SYSTEM PROVIDED HISTORY: abdominal pain TECHNOLOGIST PROVIDED HISTORY: Reason for exam:->abdominal pain Reason for Exam: neuropathy. limited exam due to poor bolus. maybe due to poor cardiac output FINDINGS: VASCULAR: CHEST: Aorta: Normal caliber thoracic aorta without evidence of aneurysm, dissection, rupture or penetrating atherosclerotic ulcer. Limited evaluation for intramural hematoma on postcontrast imaging. Mild atherosclerotic calcifications. Pulmonary Arteries: Normal caliber main pulmonary artery. No filling defect within the central pulmonary arterial tree to suggest thromboembolic disease.  ABDOMEN: Aorta: Normal caliber abdominal aorta without evidence of aneurysm, dissection, rupture or penetrating atherosclerotic ulcer. Moderate mixed calcified atherosclerotic calcifications. Visceral Branch Vessels: All of the visceral branch vessels opacify with contrast, including the celiac, superior and inferior mesenteric, and bilateral renal arteries. PELVIS: Aortoiliac: The aortoiliac and iliofemoral systems opacify with contrast. No evidence of dissection, aneurysm, or occlusion. Moderate atherosclerotic calcifications. Partially imaged right superficial femoral artery stent which opacifies with contrast. NONVASCULAR: CHEST Heart and Pericardium: Mild four-chamber cardiomegaly. Pacemaker leads present. Multivessel coronary calcifications and stents present. No pericardial effusion. Mediastinum: No pneumomediastinum, hematoma, fluid or gas collection. No enlarged lymph nodes. Segmental gaseous distention of the esophagus. Chest Wall: Left chest pacemaker generator in place. No acute soft tissue or osseous abnormality. Unremarkable thyroid. Lungs, Airways and Pleura: No pneumothorax or pleural effusion. Interlobular septal thickening with ground-glass opacities most consistent with edema. Sub 5 mm benign parenchymal lymph node in the subpleural posterior right lower lobe along the sulcus. Clear central airways. ABDOMEN Liver: Normal liver size, contour and parenchymal attenuation. Gallbladder: Cholelithiasis. Biliary: No intra or extrahepatic bile duct dilatation. Pancreas: Normal. Spleen: Normal. Adrenals: Normal. Kidneys: Kidneys are symmetric in size, contour and enhancement. No hydronephrosis or nephrolithiasis. GI Tract: No apparent wall thickening or obstruction. Normal appendix. Peritoneum/Retroperitoneum: No enlarged lymph nodes, free air or free fluid. PELVIS Genitourinary: Circumferential urinary bladder wall thickening, underdistended. Normal reproductive organs. Other: No free fluid. No enlarged lymph nodes. MUSCULOSKELETAL Bones and Soft Tissues: No acute soft tissue or osseous abnormality. No evidence of an acute aortic syndrome. No central/saddle pulmonary embolism. Moderate interstitial and alveolar pulmonary edema, presumably cardiogenic given cardiomegaly. No acute abdominopelvic findings. Cholelithiasis. Urinary bladder wall thickening is favored to be due to underdistention.      Assessment//Plan           Hospital Problems           Last Modified POA    Acute on chronic heart failure with reduced ejection fraction and diastolic dysfunction ([facility] 10/22/2020 Yes        Assessment & Plan  abd pain and diarrhea  -Imaging neg excepthas cholelithiasis but no RUQ pain  -check stool studies, immodium prn after culture collected  Stable chronic systolic CHF  -on BB, ARB held with increased Cr  -on demedex  -CXR vasc congestion  -CT PE neg for PE  HTN  -CCB, BB, cardura  NANCY on CKD 3  -recived IV dye and also having loose stools  -work up for diarrhea  -gentle IVF with IV dye history  Mod PCM  DVT prophylax  -lovenox    Electronically signed by Marla Verma MD on 10/23/20 at 9:03 AM EDT

## 2020-10-24 ENCOUNTER — APPOINTMENT (OUTPATIENT)
Dept: GENERAL RADIOLOGY | Age: 70
DRG: 393 | End: 2020-10-24
Payer: MEDICARE

## 2020-10-24 VITALS
TEMPERATURE: 97.5 F | OXYGEN SATURATION: 92 % | RESPIRATION RATE: 14 BRPM | SYSTOLIC BLOOD PRESSURE: 124 MMHG | WEIGHT: 270.1 LBS | HEART RATE: 59 BPM | HEIGHT: 72 IN | BODY MASS INDEX: 36.59 KG/M2 | DIASTOLIC BLOOD PRESSURE: 75 MMHG

## 2020-10-24 LAB
CREATININE URINE: 106.8 MG/DL (ref 39–259)
ESTIMATED AVERAGE GLUCOSE: 140 MG/DL
GLUCOSE BLD-MCNC: 197 MG/DL (ref 70–99)
GLUCOSE BLD-MCNC: 216 MG/DL (ref 70–99)
GLUCOSE BLD-MCNC: 56 MG/DL (ref 70–99)
HBA1C MFR BLD: 6.5 % (ref 4.2–6.3)
PROT/CREAT RATIO, UR: 0.4
URINE TOTAL PROTEIN: 48 MG/DL

## 2020-10-24 PROCEDURE — 94761 N-INVAS EAR/PLS OXIMETRY MLT: CPT

## 2020-10-24 PROCEDURE — 82570 ASSAY OF URINE CREATININE: CPT

## 2020-10-24 PROCEDURE — 6370000000 HC RX 637 (ALT 250 FOR IP): Performed by: HOSPITALIST

## 2020-10-24 PROCEDURE — 2580000003 HC RX 258: Performed by: STUDENT IN AN ORGANIZED HEALTH CARE EDUCATION/TRAINING PROGRAM

## 2020-10-24 PROCEDURE — 6360000002 HC RX W HCPCS: Performed by: STUDENT IN AN ORGANIZED HEALTH CARE EDUCATION/TRAINING PROGRAM

## 2020-10-24 PROCEDURE — 6370000000 HC RX 637 (ALT 250 FOR IP): Performed by: NURSE PRACTITIONER

## 2020-10-24 PROCEDURE — 6370000000 HC RX 637 (ALT 250 FOR IP): Performed by: STUDENT IN AN ORGANIZED HEALTH CARE EDUCATION/TRAINING PROGRAM

## 2020-10-24 PROCEDURE — 82962 GLUCOSE BLOOD TEST: CPT

## 2020-10-24 PROCEDURE — 80048 BASIC METABOLIC PNL TOTAL CA: CPT

## 2020-10-24 PROCEDURE — 84156 ASSAY OF PROTEIN URINE: CPT

## 2020-10-24 PROCEDURE — 71045 X-RAY EXAM CHEST 1 VIEW: CPT

## 2020-10-24 RX ORDER — NICOTINE POLACRILEX 4 MG
15 LOZENGE BUCCAL PRN
Status: DISCONTINUED | OUTPATIENT
Start: 2020-10-24 | End: 2020-10-24 | Stop reason: HOSPADM

## 2020-10-24 RX ORDER — ONDANSETRON 4 MG/1
4 TABLET, ORALLY DISINTEGRATING ORAL EVERY 8 HOURS PRN
Qty: 10 TABLET | Refills: 0 | Status: ON HOLD | OUTPATIENT
Start: 2020-10-24 | End: 2020-12-10 | Stop reason: ALTCHOICE

## 2020-10-24 RX ORDER — DEXTROSE MONOHYDRATE 50 MG/ML
100 INJECTION, SOLUTION INTRAVENOUS PRN
Status: DISCONTINUED | OUTPATIENT
Start: 2020-10-24 | End: 2020-10-24 | Stop reason: HOSPADM

## 2020-10-24 RX ORDER — DEXTROSE MONOHYDRATE 25 G/50ML
12.5 INJECTION, SOLUTION INTRAVENOUS PRN
Status: DISCONTINUED | OUTPATIENT
Start: 2020-10-24 | End: 2020-10-24 | Stop reason: HOSPADM

## 2020-10-24 RX ORDER — DICYCLOMINE HYDROCHLORIDE 10 MG/1
10 CAPSULE ORAL
Qty: 16 CAPSULE | Refills: 0 | Status: ON HOLD | OUTPATIENT
Start: 2020-10-24 | End: 2020-12-10 | Stop reason: ALTCHOICE

## 2020-10-24 RX ADMIN — POLYETHYLENE GLYCOL (3350) 17 G: 17 POWDER, FOR SOLUTION ORAL at 09:25

## 2020-10-24 RX ADMIN — CLOPIDOGREL BISULFATE 75 MG: 75 TABLET ORAL at 09:11

## 2020-10-24 RX ADMIN — HYDRALAZINE HYDROCHLORIDE 10 MG: 10 TABLET, FILM COATED ORAL at 07:11

## 2020-10-24 RX ADMIN — GABAPENTIN 600 MG: 300 CAPSULE ORAL at 09:25

## 2020-10-24 RX ADMIN — CARVEDILOL 25 MG: 25 TABLET, FILM COATED ORAL at 09:14

## 2020-10-24 RX ADMIN — DOXAZOSIN 2 MG: 1 TABLET ORAL at 09:24

## 2020-10-24 RX ADMIN — INSULIN LISPRO 2 UNITS: 100 INJECTION, SOLUTION INTRAVENOUS; SUBCUTANEOUS at 10:19

## 2020-10-24 RX ADMIN — AMLODIPINE BESYLATE 10 MG: 10 TABLET ORAL at 09:10

## 2020-10-24 RX ADMIN — INSULIN LISPRO 4 UNITS: 100 INJECTION, SOLUTION INTRAVENOUS; SUBCUTANEOUS at 11:42

## 2020-10-24 RX ADMIN — SODIUM CHLORIDE, PRESERVATIVE FREE 10 ML: 5 INJECTION INTRAVENOUS at 09:26

## 2020-10-24 RX ADMIN — ENOXAPARIN SODIUM 40 MG: 40 INJECTION SUBCUTANEOUS at 09:25

## 2020-10-24 ASSESSMENT — PAIN SCALES - GENERAL
PAINLEVEL_OUTOF10: 0
PAINLEVEL_OUTOF10: 0
PAINLEVEL_OUTOF10: 10

## 2020-10-24 ASSESSMENT — PAIN DESCRIPTION - FREQUENCY: FREQUENCY: INTERMITTENT

## 2020-10-24 ASSESSMENT — PAIN DESCRIPTION - LOCATION: LOCATION: FOOT

## 2020-10-24 ASSESSMENT — PAIN DESCRIPTION - ORIENTATION: ORIENTATION: RIGHT;LEFT

## 2020-10-24 ASSESSMENT — PAIN DESCRIPTION - PAIN TYPE: TYPE: NEUROPATHIC PAIN

## 2020-10-24 ASSESSMENT — PAIN DESCRIPTION - DESCRIPTORS: DESCRIPTORS: ACHING;DISCOMFORT

## 2020-10-24 NOTE — PROGRESS NOTES
Patient refused to allow  draw labs this morning. Patient states that  wasn't skilled enough to draw labs and that he would be leaving today. Will notify oncoming nurse.

## 2020-10-24 NOTE — PROGRESS NOTES
Blood glucose checked so patient could eat breakfast.  Patient blood glucose level at present time is 56. Unaware of low blood glucose levels until he sat up on the side of the bed. Patient eating 2 slices of Georgian toast with peanut butter and syrup. Also given 2 orange juices. No further complaints voiced.

## 2020-10-24 NOTE — PROGRESS NOTES
Nephrology Progress Note  10/24/2020 9:44 AM  Subjective:   Admit Date: 10/22/2020  PCP: Alanson Lombard Interval History: pt state doing better overall and taking po        Data:   Scheduled Meds:   insulin lispro  0-12 Units Subcutaneous TID WC    insulin lispro  0-6 Units Subcutaneous Nightly    hydrALAZINE  10 mg Oral 3 times per day    amLODIPine  10 mg Oral Daily    carvedilol  25 mg Oral BID WC    clopidogrel  75 mg Oral Daily    doxazosin  2 mg Oral Daily    gabapentin  600 mg Oral BID    sodium chloride flush  10 mL Intravenous 2 times per day    enoxaparin  40 mg Subcutaneous Daily     Continuous Infusions:   dextrose         No intake/output data recorded. [unfilled]    CBC:   Recent Labs     10/22/20  0410 10/23/20  0431   WBC 8.6 5.6   HGB 11.9* 11.2*    221     BMP:    Recent Labs     10/22/20  0410 10/23/20  0431    140   K 4.4 4.0    101   CO2 24 31   BUN 13 15   CREATININE 1.5* 1.9*   GLUCOSE 141* 86       Renal Labs  Albumin:    Lab Results   Component Value Date    LABALBU 3.3 10/22/2020     Calcium:    Lab Results   Component Value Date    CALCIUM 8.5 10/23/2020     Phosphorus:    Lab Results   Component Value Date    PHOS 3.7 09/18/2020     U/A:    Lab Results   Component Value Date    NITRU NEGATIVE 10/22/2020    COLORU YELLOW 10/22/2020    PHUR 5.0 08/19/2016    WBCUA 1 10/22/2020    RBCUA 1 10/22/2020    MUCUS RARE 10/22/2020    TRICHOMONAS NONE SEEN 10/22/2020    BACTERIA NEGATIVE 10/22/2020    CLARITYU CLEAR 10/22/2020    SPECGRAV 1.011 10/22/2020    UROBILINOGEN 1 10/22/2020    BILIRUBINUR NEGATIVE 10/22/2020    BLOODU NEGATIVE 10/22/2020    KETUA MODERATE 10/22/2020           Objective:   Vitals: BP (!) 155/47   Pulse 64   Temp 98.2 °F (36.8 °C) (Oral)   Resp 16   Ht 6' (1.829 m)   Wt 270 lb 1.6 oz (122.5 kg)   SpO2 96%   BMI 36.63 kg/m²   General appearance: awake weak  HEENT: Head: Normal, normocephalic, atraumatic.   Neck: supple, Plan:      IMP:  arf from atn on ckd 3A  CMP with icd  htn  dm2  anemia  Diarrhea    Plan     1 overall negative balance and last creat slight increase from volume depletion and now doing better.   2 cardiac stable and held diuretic  3 bp monitor off arb  4 ssi and stable  5 hb stable  6 resolved diarrhea  Weight about 270 lb and that is stable,   Will plan restart diuretic when see as outpt but if weight > 278 lb will ask he call me    Dc off arb torsemide and zaroxolyn for now  Fu 1 week             Kassy Katz MD

## 2020-10-24 NOTE — PROGRESS NOTES
Patient states he dumont not have a ride home.   Okay to call [employer] to take patient per Viviana Stephens

## 2020-10-24 NOTE — PROGRESS NOTES
Progress Note  Date:10/24/2020       Room:3103/3103-A  Patient Name:Anmol Mcguire     YOB: 1950     Age:70 y.o. Ready to go home. Diarrhea due to miralax and stopped  Subjective    Subjective:  Symptoms:  Stable. Diet:  Adequate intake. Pain:  He complains of pain that is mild. Review of Systems  Objective         Vitals Last 24 Hours:  TEMPERATURE:  Temp  Av.9 °F (36.6 °C)  Min: 97.5 °F (36.4 °C)  Max: 98.4 °F (36.9 °C)  RESPIRATIONS RANGE: Resp  Av.9  Min: 13  Max: 20  PULSE OXIMETRY RANGE: SpO2  Av.7 %  Min: 95 %  Max: 96 %  PULSE RANGE: Pulse  Av.3  Min: 60  Max: 64  BLOOD PRESSURE RANGE: Systolic (14KXT), VPX:574 , Min:120 , SGR:809   ; Diastolic (26CSG), QNP:81, Min:58, Max:101    I/O (24Hr): Intake/Output Summary (Last 24 hours) at 10/24/2020 0745  Last data filed at 10/24/2020 0320  Gross per 24 hour   Intake --   Output 1000 ml   Net -1000 ml     Objective:  General Appearance:  Comfortable. Vital signs: (most recent): Blood pressure (!) 156/80, pulse 62, temperature 97.5 °F (36.4 °C), temperature source Oral, resp. rate 17, height 6' (1.829 m), weight 270 lb 1.6 oz (122.5 kg), SpO2 96 %. Vital signs are normal.    HEENT: Normal HEENT exam.    Lungs:  Normal effort. Heart: Normal rate. Abdomen: Abdomen is soft. Extremities: Normal range of motion. Neurological: Patient is alert. Pupils:  Pupils are equal, round, and reactive to light. Skin:  Warm. Labs/Imaging/Diagnostics    Labs:  CBC:  Recent Labs     10/22/20  0410 10/23/20  0431   WBC 8.6 5.6   RBC 3.93* 3.71*   HGB 11.9* 11.2*   HCT 37.9* 35.3*   MCV 96.4 95.1   RDW 12.9 12.8    221     CHEMISTRIES:  Recent Labs     10/22/20  0410 10/23/20  0431    140   K 4.4 4.0    101   CO2 24 31   BUN 13 15   CREATININE 1.5* 1.9*   GLUCOSE 141* 86   MG  --  1.9     PT/INR:No results for input(s): PROTIME, INR in the last 72 hours. APTT:No results for input(s):  APTT in the last 72 hours. LIVER PROFILE:  Recent Labs     10/22/20  0410   AST 10*   ALT 6*   BILITOT 0.6   ALKPHOS 62       Imaging Last 24 Hours:  Xr Chest Portable    Result Date: 10/22/2020  EXAMINATION: ONE XRAY VIEW OF THE CHEST 10/22/2020 3:50 am COMPARISON: Chest radiograph August 12, 2020. HISTORY: ORDERING SYSTEM PROVIDED HISTORY: Chest pain TECHNOLOGIST PROVIDED HISTORY: Reason for exam:->Chest pain Reason for Exam: chest pain Acuity: Unknown Type of Exam: Initial FINDINGS: Cardiomegaly is noted. A left-sided intracardiac device is seen. Pulmonary vascular congestion is noted. There is no focal consolidation, pleural effusion, or pneumothorax. Cardiomegaly with pulmonary vascular congestion. Cta Chest Abdomen Pelvis W Contrast    Result Date: 10/23/2020  EXAMINATION: CTA OF THE CHEST, ABDOMEN AND PELVIS WITH CONTRAST, 10/22/2020 5:24 am TECHNIQUE: CTA of the chest, abdomen and pelvis was performed after the administration of intravenous contrast.  Multiplanar reformatted images are provided for review. MIP images are provided for review. Dose modulation, iterative reconstruction, and/or weight based adjustment of the mA/kV was utilized to reduce the radiation dose to as low as reasonably achievable. COMPARISON: CT abdomen and pelvis 08/12/2020; CT chest 11/17/2016 HISTORY: ORDERING SYSTEM PROVIDED HISTORY: abdominal pain TECHNOLOGIST PROVIDED HISTORY: Reason for exam:->abdominal pain Reason for Exam: neuropathy. limited exam due to poor bolus. maybe due to poor cardiac output FINDINGS: VASCULAR: CHEST: Aorta: Normal caliber thoracic aorta without evidence of aneurysm, dissection, rupture or penetrating atherosclerotic ulcer. Limited evaluation for intramural hematoma on postcontrast imaging. Mild atherosclerotic calcifications. Pulmonary Arteries: Normal caliber main pulmonary artery. No filling defect within the central pulmonary arterial tree to suggest thromboembolic disease.  ABDOMEN: Aorta: Normal caliber abdominal aorta without evidence of aneurysm, dissection, rupture or penetrating atherosclerotic ulcer. Moderate mixed calcified atherosclerotic calcifications. Visceral Branch Vessels: All of the visceral branch vessels opacify with contrast, including the celiac, superior and inferior mesenteric, and bilateral renal arteries. PELVIS: Aortoiliac: The aortoiliac and iliofemoral systems opacify with contrast. No evidence of dissection, aneurysm, or occlusion. Moderate atherosclerotic calcifications. Partially imaged right superficial femoral artery stent which opacifies with contrast. NONVASCULAR: CHEST Heart and Pericardium: Mild four-chamber cardiomegaly. Pacemaker leads present. Multivessel coronary calcifications and stents present. No pericardial effusion. Mediastinum: No pneumomediastinum, hematoma, fluid or gas collection. No enlarged lymph nodes. Segmental gaseous distention of the esophagus. Chest Wall: Left chest pacemaker generator in place. No acute soft tissue or osseous abnormality. Unremarkable thyroid. Lungs, Airways and Pleura: No pneumothorax or pleural effusion. Interlobular septal thickening with ground-glass opacities most consistent with edema. Sub 5 mm benign parenchymal lymph node in the subpleural posterior right lower lobe along the sulcus. Clear central airways. ABDOMEN Liver: Normal liver size, contour and parenchymal attenuation. Gallbladder: Cholelithiasis. Biliary: No intra or extrahepatic bile duct dilatation. Pancreas: Normal. Spleen: Normal. Adrenals: Normal. Kidneys: Kidneys are symmetric in size, contour and enhancement. No hydronephrosis or nephrolithiasis. GI Tract: No apparent wall thickening or obstruction. Normal appendix. Peritoneum/Retroperitoneum: No enlarged lymph nodes, free air or free fluid. PELVIS Genitourinary: Circumferential urinary bladder wall thickening, underdistended. Normal reproductive organs. Other: No free fluid.  No enlarged lymph

## 2020-10-25 NOTE — DISCHARGE SUMMARY
Physician Discharge Summary     Patient ID:  Cordella Goodell  [id number]  79 y.o.  1950    Admit date: 10/22/2020    Discharge date and time: 10/24/2020  3:57 PM     Admitting Physician: Shazia Keene DO     Discharge Physician: Court Pineda    Admission Diagnoses: Abdominal pain, unspecified abdominal location [R10.9]  Chest pain, unspecified type [R07.9]  Nausea and vomiting, intractability of vomiting not specified, unspecified vomiting type [R11.2]  Hypertension, unspecified type [I10]  Acute on chronic heart failure with reduced ejection fraction and diastolic dysfunction ([facility] [address] [I50.43]    Discharge Diagnoses: abd pain with diarrhea                                          Stable chronic systolic CHF(held ARb due to NANCY and to be restarted outpt)                                           HTN                                          NANCY on CKD 3                                          Mod PCM    Admission Condition: good    Discharged Condition: good    Indication for Admission: abd pain    Hospital Course:   79 y.o.  male  who presents with epigastric pain. Patient seen and examined on the medical surgical floor. He points out his epigastric pain which has persisted now for 3 days. No radiation of the symptoms including down to his belly, into his chest or around his flanks. He does admit to associated chest pain but again points to his epigastric area. Some alleviation with emesis and pain worse with palpation       He was admitted for abd pain with diarreha. Imaging showed cholelithiasis but no cholecystitis and no RUQ tenderness on exam. His pain resolved. He also was having diarrhea and this was due to miralax which was stopped. He had a CTPE as initially concerned about chest pain but CT PE was negative. He did have NANCY on CKD 3 and nephro consulted. He was stable and cleared by nephro to follow within the week for outpt BMP. He does have CHF and stable.  He was instructed to hold his diuretic and ARB until repeat Creatinine was taken and if stable then can restart. Also instructed if he starts getting short of breath or weight gain then restart diuretics and inform his nephrologist. He was discharged. Consults: nephrology        Outstanding Order Results     No orders found from 9/23/2020 to 10/23/2020. Discharge Exam:  HEENT: Normal HEENT exam.    Lungs:  Normal effort. Heart: Normal rate. Abdomen: Abdomen is soft. Extremities: Normal range of motion. Neurological: Patient is alert. Pupils:  Pupils are equal, round, and reactive to light. Skin:  Warm. Disposition: home    Patient Instructions:      Medication List      START taking these medications    dicyclomine 10 MG capsule  Commonly known as:  Bentyl  Take 1 capsule by mouth 4 times daily (before meals and nightly) for 4 days abd pain     ondansetron 4 MG disintegrating tablet  Commonly known as:  Zofran ODT  Take 1 tablet by mouth every 8 hours as needed for Nausea or Vomiting        CHANGE how you take these medications    doxazosin 2 MG tablet  Commonly known as:  CARDURA  TAKE 1 TABLET BY MOUTH EVERY DAY IN THE EVENING  What changed:  Another medication with the same name was removed. Continue taking this medication, and follow the directions you see here.      insulin glargine 100 UNIT/ML injection pen  Commonly known as:  Lantus SoloStar  Inject 40 Units into the skin nightly  What changed:  how much to take        CONTINUE taking these medications    albuterol sulfate  (90 Base) MCG/ACT inhaler  Commonly known as:  Ventolin HFA  Inhale 2 puffs into the lungs every 4 hours as needed for Wheezing     Algicell Calcium Dressing 4\"x8 Misc  Apply 1 Device topically daily     amLODIPine 10 MG tablet  Commonly known as:  NORVASC     BD ULTRA-FINE PEN NEEDLES 29G X 12.7MM Misc  Generic drug:  Insulin Pen Needle     Blood Pressure Kit  BID for record down in log book     carvedilol 25 MG tablet  Commonly known as: COREG  TAKE 1 TABLET BY MOUTH TWICE A DAY WITH MEALS     clopidogrel 75 MG tablet  Commonly known as:  PLAVIX  Take 1 tablet by mouth daily     gabapentin 600 MG tablet  Commonly known as:  NEURONTIN     pregabalin 50 MG capsule  Commonly known as:  Lyrica  Take 1 capsule by mouth 3 times daily for 30 days. PROMOGRAN COREY WOUND DRESSING MATRIX  Apply topically Apply to left daily and cover with gauze     Trulicity 1.56 NE/3.7GB Sopn  Generic drug:  Dulaglutide        STOP taking these medications    losartan 100 MG tablet  Commonly known as:  COZAAR     metOLazone 5 MG tablet  Commonly known as:  ZAROXOLYN     torsemide 20 MG tablet  Commonly known as:  Demadex           Where to Get Your Medications      You can get these medications from any pharmacy    Bring a paper prescription for each of these medications  · dicyclomine 10 MG capsule  · ondansetron 4 MG disintegrating tablet     ARB. Diuretic temporarily stopped until repeat BMP taken to eval creatinine outpt.      Activity: activity as tolerated  Diet: renal diet  Wound Care: none needed    Follow-up with PCP  Discharge time 30min  Signed:  Benito Lucia  10/25/2020  2:59 PM

## 2020-10-28 ENCOUNTER — APPOINTMENT (OUTPATIENT)
Dept: CT IMAGING | Age: 70
End: 2020-10-28
Payer: MEDICARE

## 2020-10-28 ENCOUNTER — APPOINTMENT (OUTPATIENT)
Dept: GENERAL RADIOLOGY | Age: 70
End: 2020-10-28
Payer: MEDICARE

## 2020-10-28 ENCOUNTER — HOSPITAL ENCOUNTER (EMERGENCY)
Age: 70
Discharge: HOME | End: 2020-10-28
Attending: EMERGENCY MEDICINE
Payer: MEDICARE

## 2020-10-28 VITALS
TEMPERATURE: 98.5 F | HEART RATE: 68 BPM | SYSTOLIC BLOOD PRESSURE: 177 MMHG | DIASTOLIC BLOOD PRESSURE: 84 MMHG | OXYGEN SATURATION: 92 % | RESPIRATION RATE: 16 BRPM

## 2020-10-28 LAB
EKG ATRIAL RATE: 76 BPM
EKG DIAGNOSIS: NORMAL
EKG P AXIS: 82 DEGREES
EKG P-R INTERVAL: 182 MS
EKG Q-T INTERVAL: 416 MS
EKG QRS DURATION: 96 MS
EKG QTC CALCULATION (BAZETT): 468 MS
EKG R AXIS: 27 DEGREES
EKG T AXIS: 167 DEGREES
EKG VENTRICULAR RATE: 76 BPM

## 2020-10-28 PROCEDURE — 70450 CT HEAD/BRAIN W/O DYE: CPT

## 2020-10-28 PROCEDURE — 73060 X-RAY EXAM OF HUMERUS: CPT

## 2020-10-28 PROCEDURE — 6370000000 HC RX 637 (ALT 250 FOR IP): Performed by: PHYSICIAN ASSISTANT

## 2020-10-28 PROCEDURE — 72125 CT NECK SPINE W/O DYE: CPT

## 2020-10-28 PROCEDURE — 70486 CT MAXILLOFACIAL W/O DYE: CPT

## 2020-10-28 PROCEDURE — 72170 X-RAY EXAM OF PELVIS: CPT

## 2020-10-28 PROCEDURE — 71045 X-RAY EXAM CHEST 1 VIEW: CPT

## 2020-10-28 PROCEDURE — 73030 X-RAY EXAM OF SHOULDER: CPT

## 2020-10-28 PROCEDURE — 99284 EMERGENCY DEPT VISIT MOD MDM: CPT

## 2020-10-28 PROCEDURE — 2500000003 HC RX 250 WO HCPCS: Performed by: EMERGENCY MEDICINE

## 2020-10-28 PROCEDURE — 6370000000 HC RX 637 (ALT 250 FOR IP): Performed by: EMERGENCY MEDICINE

## 2020-10-28 PROCEDURE — 73080 X-RAY EXAM OF ELBOW: CPT

## 2020-10-28 RX ORDER — ONDANSETRON 4 MG/1
4 TABLET, ORALLY DISINTEGRATING ORAL ONCE
Status: COMPLETED | OUTPATIENT
Start: 2020-10-28 | End: 2020-10-28

## 2020-10-28 RX ORDER — ACETAMINOPHEN 325 MG/1
650 TABLET ORAL EVERY 6 HOURS PRN
Qty: 120 TABLET | Refills: 3 | Status: ON HOLD | OUTPATIENT
Start: 2020-10-28 | End: 2020-12-27 | Stop reason: HOSPADM

## 2020-10-28 RX ORDER — HYDROCODONE BITARTRATE AND ACETAMINOPHEN 5; 325 MG/1; MG/1
1 TABLET ORAL ONCE
Status: DISCONTINUED | OUTPATIENT
Start: 2020-10-28 | End: 2020-10-28

## 2020-10-28 RX ORDER — TRAMADOL HYDROCHLORIDE 50 MG/1
50 TABLET ORAL ONCE
Status: COMPLETED | OUTPATIENT
Start: 2020-10-28 | End: 2020-10-28

## 2020-10-28 RX ORDER — PRAMOXINE HCL/BENZALKONIUM CHL 1%-0.13%
SPRAY, NON-AEROSOL (ML) TOPICAL
Qty: 1 TUBE | Refills: 0 | Status: SHIPPED | OUTPATIENT
Start: 2020-10-28 | End: 2020-11-04

## 2020-10-28 RX ORDER — TRAMADOL HYDROCHLORIDE 50 MG/1
50 TABLET ORAL EVERY 4 HOURS PRN
Qty: 18 TABLET | Refills: 0 | Status: SHIPPED | OUTPATIENT
Start: 2020-10-28 | End: 2020-10-31

## 2020-10-28 RX ORDER — ONDANSETRON 4 MG/1
4 TABLET, ORALLY DISINTEGRATING ORAL EVERY 8 HOURS PRN
Qty: 15 TABLET | Refills: 0 | Status: ON HOLD | OUTPATIENT
Start: 2020-10-28 | End: 2020-12-10 | Stop reason: ALTCHOICE

## 2020-10-28 RX ORDER — AMLODIPINE BESYLATE 5 MG/1
10 TABLET ORAL ONCE
Status: COMPLETED | OUTPATIENT
Start: 2020-10-28 | End: 2020-10-28

## 2020-10-28 RX ORDER — CARVEDILOL 25 MG/1
25 TABLET ORAL 2 TIMES DAILY WITH MEALS
Status: DISCONTINUED | OUTPATIENT
Start: 2020-10-28 | End: 2020-10-28 | Stop reason: HOSPADM

## 2020-10-28 RX ADMIN — TRAMADOL HYDROCHLORIDE 50 MG: 50 TABLET, FILM COATED ORAL at 12:51

## 2020-10-28 RX ADMIN — ONDANSETRON 4 MG: 4 TABLET, ORALLY DISINTEGRATING ORAL at 12:52

## 2020-10-28 RX ADMIN — CARVEDILOL 25 MG: 25 TABLET, FILM COATED ORAL at 15:28

## 2020-10-28 RX ADMIN — LIDOCAINE HYDROCHLORIDE 10 ML: 10 INJECTION, SOLUTION EPIDURAL; INFILTRATION; INTRACAUDAL; PERINEURAL at 15:21

## 2020-10-28 RX ADMIN — AMLODIPINE BESYLATE 10 MG: 5 TABLET ORAL at 15:28

## 2020-10-28 ASSESSMENT — PAIN SCALES - GENERAL
PAINLEVEL_OUTOF10: 6
PAINLEVEL_OUTOF10: 8

## 2020-10-28 ASSESSMENT — ENCOUNTER SYMPTOMS
GASTROINTESTINAL NEGATIVE: 1
RESPIRATORY NEGATIVE: 1
ALLERGIC/IMMUNOLOGIC NEGATIVE: 1
EYES NEGATIVE: 1

## 2020-10-28 ASSESSMENT — PAIN DESCRIPTION - PAIN TYPE: TYPE: ACUTE PAIN

## 2020-10-28 ASSESSMENT — PAIN DESCRIPTION - LOCATION: LOCATION: OTHER (COMMENT)

## 2020-10-28 NOTE — ED NOTES
Bed: ED-26  Expected date:   Expected time:   Means of arrival:   Comments:  EMS     Elkin Prajapati RN  10/28/20 1143

## 2020-10-28 NOTE — ED PROVIDER NOTES
_______    My participation in patient case is procedural.  Please see supervising physician's note for complete history, exam and treatment.  _________________________________________________________________       Procedure Note - WARD Forman-RACH      Laceration Repair Procedure Note    Indication: Skin Laceration- nose    Procedure:   - Procedure explained, including risks and benefits explained to the patient who expressed understanding. All questions were answered. Verbal consent obtained. - The Wound was prepped and draped in the usual sterile fashion using chlorhexidine and sterile saline.  - The wound is anesthetized using 1% lidocaine without epinephrine, approximately 1.5 ml  - Wound was explored to it's depth,  no compromise of neurovascular structures, no foreign bodies. - Wound was irrigated with copious amounts of sterile saline and mechanically debrided utilizing sterile gauze. - The laceration was Closed with 5-0 Prolene sutures, total number of 3,  simple interrupted  - Hemostasis and good cosmesis was achieved. Blood loss minimal.  - The wound area was then dressed with Sterile nonstick dressing, sterile gauze, and tape. - Patient tolerated procedure well without complications. Total repaired wound length: [place]-shaped approximately 1.3 cm in length. Discussed with Pt (and/or family member) at bedside today:  I discussed possibility of infection, retained foreign body, tendon injury, nerve injury. Wound care and scar minimization education was provided. Instructions were given to return for increasing pain, redness, streaking, discharge, or any other worsening or worrisome concerns. Wound check in 48 hours. Suture/Staple removal in 5 days.     ________________________________________________________________________            WARD Forman  10/28/20 1409

## 2020-10-28 NOTE — ED PROVIDER NOTES
[facility]      TRIAGE CHIEF COMPLAINT:   Fall (no LOC, on blood thinners)      [place] a 79 y.o. male that presents with complaint of fall, head injury. Patient was getting up out of bed when he fell out of bed denies any preceding symptoms. Patient fell had a laceration to his nose he is on Plavix. Denies any fevers nausea vomiting chest pain shortness of breath no weakness numbness tingling does have left shoulder pain. Again did not pass out chest nose pain and bleeding after falling hit his head does have some slight neck pain. No other questions or concerns. Has not had his blood pressure medicines today. REVIEW OF SYSTEMS:  At least 10 systems reviewed and otherwise acutely negative except as in the [address] Review of Systems   Constitutional: Negative. HENT: Positive for nosebleeds. Eyes: Negative. Respiratory: Negative. Cardiovascular: Negative. Gastrointestinal: Negative. Endocrine: Negative. Genitourinary: Negative. Musculoskeletal: Positive for arthralgias, myalgias and neck pain. Skin: Positive for wound. Allergic/Immunologic: Negative. Neurological: Positive for headaches. Hematological: Negative. Psychiatric/Behavioral: Negative. All other systems reviewed and are negative.       Past Medical History:   Diagnosis Date    Acid reflux     Acute MI [facility]) 2004, 2008    Arthritis     Back    Broken teeth     Upper Front    CAD (coronary artery disease)     Sees Dr. Che Learn [facility])     per old chart    CHF (congestive heart failure) ([facility] [address]     Chronic back pain     Chronic kidney disease     STAGE 3 KIDNEY FAILURE- \"from my diabetes- do not follow with any one- have seen Dr Jasmina Arnold in the past\"    Diabetes mellitus ([address] Dx 1965    per old chart pt has been diabetic since age 13    Diabetic neuropathy ([facility] [address]     \"in my feet\"    H/O cardiovascular stress test 08/25/2016    H/O Doppler ultrasound 09/27/2018    Moderate disease of the right lower extremity with an JALEN of 0.72.   Moderate to severe disease of the left lower extremity with an JALEN of 0.55.   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    Neuropathy     both feet    neuropathy     PAD (peripheral artery disease) ([facility] 09/27/2018    PVD (peripheral vascular disease) ([facility] [facility]     Sick sinus syndrome ([facility] [address] [facility]     Sleep apnea     \"sleep study 3 yrs ago- could not tolerate the cpap made me too dry\"    Spinal stenosis     Teeth missing     Upper And Lower    Type 2 diabetes mellitus without complication [facility])      Past Surgical History:   Procedure Laterality Date    CARDIAC CATHETERIZATION      per old chart done 10/2014    CARDIAC CATHETERIZATION  07/14/2017    with angiography of leg    CARDIAC CATHETERIZATION  11/20/2018    PATENT STENTS OF ALL THREE MAJOR VESSELS    CARDIAC DEFIBRILLATOR PLACEMENT  06/04/2010    [employer] [address] Defibrillator Implanted    COLECTOMY Right 08/26/2016    laparascopic; robotic assisted    COLONOSCOPY  08/04/2016    CORONARY ANGIOPLASTY      \"15 Heart Stents\"    CORONARY ANGIOPLASTY WITH STENT PLACEMENT      per old chart had angio with stent to circumflex and obtuse marginal artery at [facility] 5/2010( old chart also gives hx of stent placement done 2000,2004 and 2005)    DENTAL SURGERY      Teeth Extracted In Past    PACEMAKER PLACEMENT  06/04/2010    [employer] [address] Defibrillator Implanted    TOE AMPUTATION Right 09/12/2017    Rt 3rd toe    TOE AMPUTATION Right 01/09/2018     Right 5th toe amputation and Toenails trimmed left 2,3,4 and 5th toes    VASCULAR SURGERY      per old chart had balloon angioplasty right superfical femoral artery,right popliteal artery,,right ant.tibial artery, right tibioperoneal trunk, and right post.tibial artery wna stent placement right popliteal artery and superfical femoral artery 7/2012     Family History   Problem Relation Age of Onset    Diabetes Mother     Stroke Mother     High Blood Pressure Mother     Vision Loss Mother     Cancer Father         Prostate Cancer    Diabetes Sister     Neuropathy Sister     Other Sister         \"Breathing Problems\"    Heart Disease Sister     Early Death Sister 62        Heart Complications    Cancer Brother         \"Stomach Cancer\"    High Blood Pressure Brother     Diabetes Brother     Heart Disease Brother     High Blood Pressure Brother     Cancer Son         \"Testicle Cancer\"     Social History     Socioeconomic History    Marital status:       Spouse name: Not on file    Number of children: Not on file    Years of education: Not on file    Highest education level: Not on file   Occupational History    Not on file   Social Needs    Financial resource strain: Not on file    Food insecurity     Worry: Not on file     Inability: Not on file    Transportation needs     Medical: Not on file     Non-medical: Not on file   Tobacco Use    Smoking status: Former Smoker     Packs/day: 0.00     Years: 36.00     Pack years: 0.00     Types: Cigars     Start date: 1/1/1980    Smokeless tobacco: Never Used   Substance and Sexual Activity    Alcohol use: No     Frequency: Never    Drug use: Yes     Types: Marijuana    Sexual activity: Never   Lifestyle    Physical activity     Days per week: Not on file     Minutes per session: Not on file    Stress: Not on file   Relationships    Social connections     Talks on phone: Not on file     Gets together: Not on file     Attends Latter day service: Not on file     Active member of club or organization: Not on file     Attends meetings of clubs or organizations: Not on file     Relationship status: Not on file    Intimate partner violence     Fear of current or ex partner: Not on file     Emotionally abused: Not on file     Physically abused: Not on file     Forced sexual activity: Not on file   Other Topics Concern    Not on file   Social History Narrative    Not on file     Current Facility-Administered Medications   Medication Dose Route Frequency Provider Last Rate Last Dose    lidocaine 1 % injection 10 mL  10 mL Intradermal Once EletrogÃƒÂ³es, DO        amLODIPine (NORVASC) tablet 10 mg  10 mg Oral Once WARD Caro        carvedilol (COREG) tablet 25 mg  25 mg Oral BID WC WARD Caro         Current Outpatient Medications   Medication Sig Dispense Refill    traMADol (ULTRAM) 50 MG tablet Take 1 tablet by mouth every 4 hours as needed for Pain for up to 3 days. Intended supply: 3 days. Take lowest dose possible to manage pain 18 tablet 0    ondansetron (ZOFRAN ODT) 4 MG disintegrating tablet Take 1 tablet by mouth every 8 hours as needed for Nausea 15 tablet 0    neomycin-polymyxin-pramoxine (NEOSPORIN PLUS) 1 % cream Apply topically 2 times daily. 1 Tube 0    acetaminophen (TYLENOL) 325 MG tablet Take 2 tablets by mouth every 6 hours as needed for Pain 120 tablet 3    dicyclomine (BENTYL) 10 MG capsule Take 1 capsule by mouth 4 times daily (before meals and nightly) for 4 days abd pain 16 capsule 0    ondansetron (ZOFRAN ODT) 4 MG disintegrating tablet Take 1 tablet by mouth every 8 hours as needed for Nausea or Vomiting 10 tablet 0    pregabalin (LYRICA) 50 MG capsule Take 1 capsule by mouth 3 times daily for 30 days.  30 capsule 0    albuterol sulfate HFA (VENTOLIN HFA) 108 (90 Base) MCG/ACT inhaler Inhale 2 puffs into the lungs every 4 hours as needed for Wheezing 1 Inhaler 3    doxazosin (CARDURA) 2 MG tablet TAKE 1 TABLET BY MOUTH EVERY DAY IN THE EVENING 90 tablet 1    Calcium Alginate (ALGICELL CALCIUM DRESSING 4\"X8) MISC Apply 1 Device topically daily 30 each 3    PROMOGRAN COREY WOUND DRESSING MATRIX Apply topically Apply to left daily and cover with gauze 1 Package 3    amLODIPine (NORVASC) 10 MG tablet Take 10 mg by mouth daily      TRULICITY 5.79 JY/8.8MN SOPN once a week       BD ULTRA-FINE PEN NEEDLES 29G X 12.7MM MISC       carvedilol (COREG) 25 MG tablet TAKE 1 TABLET BY MOUTH TWICE A DAY WITH MEALS 180 tablet 3    Blood Pressure KIT BID for record down in log book 1 kit 0    clopidogrel (PLAVIX) 75 MG tablet Take 1 tablet by mouth daily 30 tablet 11    gabapentin (NEURONTIN) 600 MG tablet Take 600 mg by mouth 3 times daily.  insulin glargine (LANTUS SOLOSTAR) 100 UNIT/ML injection pen Inject 40 Units into the skin nightly (Patient taking differently: Inject 44 Units into the skin nightly ) 5 Pen 3      Allergies   Allergen Reactions    Pcn [Penicillins] Hives    Fentanyl Itching     Current Facility-Administered Medications   Medication Dose Route Frequency Provider Last Rate Last Dose    lidocaine 1 % injection 10 mL  10 mL Intradermal Once Clorinda Belts, DO        amLODIPine (NORVASC) tablet 10 mg  10 mg Oral Once WARD Curiel        carvedilol (COREG) tablet 25 mg  25 mg Oral BID  WARD Curiel         Current Outpatient Medications   Medication Sig Dispense Refill    traMADol (ULTRAM) 50 MG tablet Take 1 tablet by mouth every 4 hours as needed for Pain for up to 3 days. Intended supply: 3 days. Take lowest dose possible to manage pain 18 tablet 0    ondansetron (ZOFRAN ODT) 4 MG disintegrating tablet Take 1 tablet by mouth every 8 hours as needed for Nausea 15 tablet 0    neomycin-polymyxin-pramoxine (NEOSPORIN PLUS) 1 % cream Apply topically 2 times daily.  1 Tube 0    acetaminophen (TYLENOL) 325 MG tablet Take 2 tablets by mouth every 6 hours as needed for Pain 120 tablet 3    dicyclomine (BENTYL) 10 MG capsule Take 1 capsule by mouth 4 times daily (before meals and nightly) for 4 days abd pain 16 capsule 0    ondansetron (ZOFRAN ODT) 4 MG disintegrating tablet Take 1 tablet by mouth every 8 hours as needed for Nausea or Vomiting 10 tablet 0    pregabalin (LYRICA) 50 MG capsule Take 1 capsule by mouth 3 times daily for 30 days. 30 capsule 0    albuterol sulfate HFA (VENTOLIN HFA) 108 (90 Base) MCG/ACT inhaler Inhale 2 puffs into the lungs every 4 hours as needed for Wheezing 1 Inhaler 3    doxazosin (CARDURA) 2 MG tablet TAKE 1 TABLET BY MOUTH EVERY DAY IN THE EVENING 90 tablet 1    Calcium Alginate (ALGICELL CALCIUM DRESSING 4\"X8) MISC Apply 1 Device topically daily 30 each 3    PROMOGRAN COREY WOUND DRESSING MATRIX Apply topically Apply to left daily and cover with gauze 1 Package 3    amLODIPine (NORVASC) 10 MG tablet Take 10 mg by mouth daily      TRULICITY 1.49 EE/6.1ZO SOPN once a week       BD ULTRA-FINE PEN NEEDLES 29G X 12.7MM MISC       carvedilol (COREG) 25 MG tablet TAKE 1 TABLET BY MOUTH TWICE A DAY WITH MEALS 180 tablet 3    Blood Pressure KIT BID for record down in log book 1 kit 0    clopidogrel (PLAVIX) 75 MG tablet Take 1 tablet by mouth daily 30 tablet 11    gabapentin (NEURONTIN) 600 MG tablet Take 600 mg by mouth 3 times daily.  insulin glargine (LANTUS SOLOSTAR) 100 UNIT/ML injection pen Inject 40 Units into the skin nightly (Patient taking differently: Inject 44 Units into the skin nightly ) 5 Pen 3       Nursing Notes Reviewed    VITAL SIGNS:  ED Triage Vitals   Enc Vitals Group      BP       Pulse       Resp       Temp       Temp src       SpO2       Weight       Height       Head Circumference       Peak Flow       Pain Score       Pain Loc       Pain Edu? Excl. in [address]? PHYSICAL EXAM:  Physical Exam  Vitals signs and nursing note reviewed. Constitutional:       General: He is not in acute distress. Appearance: Normal appearance.  He is well-developed and well-groomed. He is not ill-appearing, toxic-appearing or diaphoretic. Interventions: Cervical collar in place. HENT:      Head: Normocephalic. Laceration present. No raccoon eyes, Aguero's sign, abrasion or contusion. Right Ear: External ear normal.      Left Ear: External ear normal.      Nose: Signs of injury, laceration and nasal tenderness present. No congestion or rhinorrhea. Right Nostril: No epistaxis or septal hematoma. Left Nostril: No epistaxis or septal hematoma. Mouth/Throat:      Lips: No lesions. Mouth: Injury present. No lacerations, oral lesions or angioedema. Dentition: Dental tenderness present. Pharynx: Oropharynx is clear. Uvula midline. No pharyngeal swelling, oropharyngeal exudate, posterior oropharyngeal erythema or uvula swelling. Tonsils: No tonsillar exudate or tonsillar abscesses. Eyes:      General: No scleral icterus. Right eye: No discharge. Left eye: No discharge. Extraocular Movements: Extraocular movements intact. Conjunctiva/sclera: Conjunctivae normal.   Neck:      Musculoskeletal: Normal range of motion. Normal range of motion. Muscular tenderness present. No edema, erythema, neck rigidity, crepitus, injury, torticollis or spinous process tenderness. Vascular: No JVD. Trachea: Phonation normal.   Cardiovascular:      Rate and Rhythm: Normal rate and regular rhythm. Pulses: Normal pulses. Heart sounds: Normal heart sounds. No murmur. No friction rub. No gallop. Pulmonary:      Effort: Pulmonary effort is normal. No respiratory distress. Breath sounds: Normal breath sounds. No stridor. No wheezing, rhonchi or rales. Abdominal:      General: Bowel sounds are normal. There is no distension. Palpations: Abdomen is soft. There is no mass. Tenderness: There is no abdominal tenderness. There is no guarding or rebound.  Negative signs include Palma's sign, Rovsing's sign and McBurney's sign. Hernia: No hernia is present. Musculoskeletal: Normal range of motion. General: Tenderness and signs of injury present. No swelling. Right shoulder: Normal.      Left shoulder: He exhibits tenderness and pain. He exhibits no deformity. Right elbow: Normal.     Left elbow: Normal.      Right wrist: Normal.      Left wrist: Normal.      Right hip: Normal.      Left hip: Normal.      Right knee: Normal.      Left knee: Normal.      Right ankle: Normal.      Left ankle: Normal.      Cervical back: He exhibits tenderness. Thoracic back: Normal.      Lumbar back: Normal.      Right lower leg: No edema. Left lower leg: No edema. Skin:     General: Skin is warm. Coloration: Skin is not jaundiced or pale. Findings: No bruising, erythema, lesion or rash. Neurological:      General: No focal deficit present. Mental Status: He is alert and oriented to person, place, and time. GCS: GCS eye subscore is 4. GCS verbal subscore is 5. GCS motor subscore is 6. Cranial Nerves: Cranial nerves are intact. No cranial nerve deficit, dysarthria or facial asymmetry. Sensory: Sensation is intact. No sensory deficit. Motor: Motor function is intact. No weakness, tremor, atrophy, abnormal muscle tone or seizure activity. Coordination: Coordination is intact. Coordination normal.   Psychiatric:         Mood and Affect: Mood normal.         Behavior: Behavior normal. Behavior is cooperative. Thought Content: Thought content normal.         Judgment: Judgment normal.           I have reviewed andinterpreted all of the currently available lab results from this visit (if applicable):    No results found for this visit on 10/28/20.      Radiographs (if obtained):  [] The following radiograph was interpreted by myself in the absence of a radiologist:  [x] Radiologist's Report Reviewed:    CT Brain, CT c spine    Xr Chest Portable    Result Date: 10/24/2020  EXAMINATION: ONE XRAY VIEW OF THE CHEST 10/24/2020 4:38 am COMPARISON: 10/22/2020 HISTORY: ORDERING SYSTEM PROVIDED HISTORY: SOB TECHNOLOGIST PROVIDED HISTORY: Reason for exam:->SOB Reason for Exam: SOB Acuity: Unknown Type of Exam: Unknown FINDINGS: The mediastinal and cardiac contours are stable. There is a left pacemaker. The lungs are clear. There is no focal consolidation, pleural effusion or pneumothorax evident. The bones are unremarkable. No acute cardiopulmonary process identified. Xr Chest Portable    Result Date: 10/22/2020  EXAMINATION: ONE XRAY VIEW OF THE CHEST 10/22/2020 3:50 am COMPARISON: Chest radiograph August 12, 2020. HISTORY: ORDERING SYSTEM PROVIDED HISTORY: Chest pain TECHNOLOGIST PROVIDED HISTORY: Reason for exam:->Chest pain Reason for Exam: chest pain Acuity: Unknown Type of Exam: Initial FINDINGS: Cardiomegaly is noted. A left-sided intracardiac device is seen. Pulmonary vascular congestion is noted. There is no focal consolidation, pleural effusion, or pneumothorax. Cardiomegaly with pulmonary vascular congestion. Cta Chest Abdomen Pelvis W Contrast    Result Date: 10/23/2020  EXAMINATION: CTA OF THE CHEST, ABDOMEN AND PELVIS WITH CONTRAST, 10/22/2020 5:24 am TECHNIQUE: CTA of the chest, abdomen and pelvis was performed after the administration of intravenous contrast.  Multiplanar reformatted images are provided for review. MIP images are provided for review. Dose modulation, iterative reconstruction, and/or weight based adjustment of the mA/kV was utilized to reduce the radiation dose to as low as reasonably achievable. COMPARISON: CT abdomen and pelvis 08/12/2020; CT chest 11/17/2016 HISTORY: ORDERING SYSTEM PROVIDED HISTORY: abdominal pain TECHNOLOGIST PROVIDED HISTORY: Reason for exam:->abdominal pain Reason for Exam: neuropathy. limited exam due to poor bolus.   maybe due to poor cardiac output FINDINGS: VASCULAR: CHEST: Aorta: Normal caliber thoracic aorta without evidence of aneurysm, dissection, rupture or penetrating atherosclerotic ulcer. Limited evaluation for intramural hematoma on postcontrast imaging. Mild atherosclerotic calcifications. Pulmonary Arteries: Normal caliber main pulmonary artery. No filling defect within the central pulmonary arterial tree to suggest thromboembolic disease. ABDOMEN: Aorta: Normal caliber abdominal aorta without evidence of aneurysm, dissection, rupture or penetrating atherosclerotic ulcer. Moderate mixed calcified atherosclerotic calcifications. Visceral Branch Vessels: All of the visceral branch vessels opacify with contrast, including the celiac, superior and inferior mesenteric, and bilateral renal arteries. PELVIS: Aortoiliac: The aortoiliac and iliofemoral systems opacify with contrast. No evidence of dissection, aneurysm, or occlusion. Moderate atherosclerotic calcifications. Partially imaged right superficial femoral artery stent which opacifies with contrast. NONVASCULAR: CHEST Heart and Pericardium: Mild four-chamber cardiomegaly. Pacemaker leads present. Multivessel coronary calcifications and stents present. No pericardial effusion. Mediastinum: No pneumomediastinum, hematoma, fluid or gas collection. No enlarged lymph nodes. Segmental gaseous distention of the esophagus. Chest Wall: Left chest pacemaker generator in place. No acute soft tissue or osseous abnormality. Unremarkable thyroid. Lungs, Airways and Pleura: No pneumothorax or pleural effusion. Interlobular septal thickening with ground-glass opacities most consistent with edema. Sub 5 mm benign parenchymal lymph node in the subpleural posterior right lower lobe along the sulcus. Clear central airways. ABDOMEN Liver: Normal liver size, contour and parenchymal attenuation. Gallbladder: Cholelithiasis. Biliary: No intra or extrahepatic bile duct dilatation.  Pancreas: Normal. Spleen: Normal. Adrenals: Normal. Kidneys: Kidneys are symmetric in size, contour and enhancement. No hydronephrosis or nephrolithiasis. GI Tract: No apparent wall thickening or obstruction. Normal appendix. Peritoneum/Retroperitoneum: No enlarged lymph nodes, free air or free fluid. PELVIS Genitourinary: Circumferential urinary bladder wall thickening, underdistended. Normal reproductive organs. Other: No free fluid. No enlarged lymph nodes. MUSCULOSKELETAL Bones and Soft Tissues: No acute soft tissue or osseous abnormality. No evidence of an acute aortic syndrome. No central/saddle pulmonary embolism. Moderate interstitial and alveolar pulmonary edema, presumably cardiogenic given cardiomegaly. No acute abdominopelvic findings. Cholelithiasis. Urinary bladder wall thickening is favored to be due to underdistention. EKG (if obtained): (All EKG's are interpreted by myself in the absence of a cardiologist)    MDM:    Patient here with fall, head injury. Again patient was getting out of bed when he fell mechanical fall no preceding symptoms. Hit his head he is on Plavix he has a laceration to the bridge of his nose suture repair performed by PA please see her note. Patient is on Plavix he does have some left shoulder pain and some neck pain imaging of his head neck face shoulder chest pelvis all negative set for nasal bone fracture none open fracture no septal hematoma no active bleeding patient observed and well given pain nausea medicine initially put in a c-collar is in c-collar removed he is now cleared vital signs otherwise stable he is not had his home blood pressure medicine today yet. Otherwise patient neurovascular intact. Patient stable he feels comfortable going home given return precautions follow-up information pain medicine suture instructions discharge stable.     CLINICAL IMPRESSION:  Final diagnoses:   Fall, initial encounter   Injury of head, initial encounter   Closed fracture of nasal bone, initial encounter   Laceration of nose, initial encounter (Please note that portions of this note may have been completed with a voice recognition program. Efforts were made to edit the dictations but occasionally words aremis-transcribed.)    DISPOSITION REFERRAL (if applicable): [address] [place] [address]  [phone]    In 1 day      [facility] Emergency Department  [address] [id number] [phone]    If symptoms worsen    [facility] Emergency Department  [facility] [facility] [phone]  In 1 week  For suture removal    [facility]  [phone]    In 1 week  For suture removal    Dana Jimenez MD  [address]  [phone]    Schedule an appointment as soon as possible for a visit in 1 day  ENT      DISPOSITION MEDICATIONS (if applicable):  New Prescriptions    ACETAMINOPHEN (TYLENOL) 325 MG TABLET    Take 2 tablets by mouth every 6 hours as needed for Pain    NEOMYCIN-POLYMYXIN-PRAMOXINE (NEOSPORIN PLUS) 1 % CREAM    Apply topically 2 times daily. ONDANSETRON (ZOFRAN ODT) 4 MG DISINTEGRATING TABLET    Take 1 tablet by mouth every 8 hours as needed for Nausea    TRAMADOL (ULTRAM) 50 MG TABLET    Take 1 tablet by mouth every 4 hours as needed for Pain for up to 3 days. Intended supply: 3 days.  Take lowest dose possible to manage pain          DO Jarek Sepulveda DO  10/28/20 [id number]

## 2020-10-28 NOTE — ED NOTES
Spoke with [employer], pt's daughter. Jaz will be here to pick pt up in a few minutes.       Didi Pat RN  10/28/20 [id number]

## 2020-10-28 NOTE — CARE COORDINATION
Patient identified as potential readmission. Last admission 10/22-10/24 for abdominal pain. Patient here today for injuries from a fall. Patient underwent laceration repair while in the emergency department. No acute findings requiring admission identified today. Readmission was avoided and patient was able to be discharged home with outpatient follow up.

## 2020-11-06 ENCOUNTER — HOSPITAL ENCOUNTER (EMERGENCY)
Age: 70
Discharge: HOME | End: 2020-11-06
Payer: MEDICARE

## 2020-11-06 VITALS
TEMPERATURE: 98 F | HEART RATE: 68 BPM | RESPIRATION RATE: 16 BRPM | DIASTOLIC BLOOD PRESSURE: 79 MMHG | WEIGHT: 255 LBS | OXYGEN SATURATION: 94 % | BODY MASS INDEX: 34.54 KG/M2 | HEIGHT: 72 IN | SYSTOLIC BLOOD PRESSURE: 205 MMHG

## 2020-11-06 DIAGNOSIS — Z48.02 VISIT FOR SUTURE REMOVAL: Primary | ICD-10-CM

## 2020-11-06 DIAGNOSIS — R03.0 ELEVATED BLOOD PRESSURE READING: ICD-10-CM

## 2020-11-06 PROCEDURE — 4500000002 HC ER NO CHARGE

## 2020-11-06 PROCEDURE — 99281 EMR DPT VST MAYX REQ PHY/QHP: CPT

## 2020-11-06 NOTE — CARE COORDINATION
Pt identified as potential readmission. Last admission 10/22 - 10/25 for Abdominal pain, unspecified abdominal location [R10.9]  Chest pain, unspecified type. Pt here today for Suture Removal.    Suture removal. No acute findings. Readmission was avoided and pt was able to be d/c'd home.

## 2020-11-06 NOTE — ED PROVIDER NOTES
eMERGENCY dEPARTMENT eNCOUnter        PCP: [id number]    Chief Complaint   Patient presents with    Suture / Staple Removal       HPI    Kade Engel is a 79 y.o. male who presents needing suture removal.  Patient had a fall over a week ago was seen in this emergency department. He had 3 sutures placed across nasal bridge. He states that wound has been healing well, no drainage, surrounding redness. No fevers or chills. States area is slightly itchy, otherwise no symptoms. REVIEW OF SYSTEMS    General:   Denies fevers or chills  Skin: + healing Laceration. See HPI. Musculoskeletal:  No distal numbness, tingling. No obvious tendon or motor deficits. Denies any other musculoskeletal injuries or skin trauma. All other review of systems are negative  See HPI and nursing notes for additional information     PAST MEDICAL & SURGICAL HISTORY    Past Medical History:   Diagnosis Date    Acid reflux     Acute MI ([address] 2004, 2008    Arthritis     Back    Broken teeth     Upper Front    CAD (coronary artery disease)     Sees Dr. Edwige Oneill [facility])     per old chart    CHF (congestive heart failure) ([address]     Chronic back pain     Chronic kidney disease     STAGE 3 KIDNEY FAILURE- \"from my diabetes- do not follow with any one- have seen Dr Brad Perez in the past\"    Diabetes mellitus ([address] Dx 1965    per old chart pt has been diabetic since age 13    Diabetic neuropathy ([address]     \"in my feet\"    H/O cardiovascular stress test 08/25/2016    H/O Doppler ultrasound 09/27/2018    Moderate disease of the right lower extremity with an JALEN of 0.72.   Moderate to severe disease of the left lower extremity with an JALEN of 0.55.   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    Neuropathy     both feet    neuropathy     PAD [facility]peripheral artery disease) ([facility] 09/27/2018    PVD (peripheral vascular disease) (HCC)     Sick sinus syndrome (HCC)     Sleep apnea     \"sleep study 3 yrs ago- could not tolerate the cpap made me too dry\"    Spinal stenosis     Teeth missing     Upper And Lower    Type 2 diabetes mellitus without complication [facility])      Past Surgical History:   Procedure Laterality Date    CARDIAC CATHETERIZATION      per old chart done 10/2014    CARDIAC CATHETERIZATION  07/14/2017    with angiography of leg    CARDIAC CATHETERIZATION  11/20/2018    PATENT STENTS OF ALL THREE MAJOR VESSELS    CARDIAC DEFIBRILLATOR PLACEMENT  06/04/2010    [employer] [address] DR Defibrillator Implanted    COLECTOMY Right 08/26/2016    laparascopic; robotic assisted    COLONOSCOPY  08/04/2016    CORONARY ANGIOPLASTY      \"15 Heart Stents\"    CORONARY ANGIOPLASTY WITH STENT PLACEMENT      per old chart had angio with stent to circumflex and obtuse marginal artery at [facility] 5/2010( old chart also gives hx of stent placement done 2000,2004 and 2005)   [facility]      Teeth Extracted In Past    PACEMAKER PLACEMENT  06/04/2010    [employer]onic Secura DR Defibrillator Implanted    TOE AMPUTATION Right 09/12/2017    Rt 3rd toe    TOE AMPUTATION Right 01/09/2018     Right 5th toe amputation and Toenails trimmed left 2,3,4 and 5th toes    VASCULAR SURGERY      per old chart had balloon angioplasty right superfical femoral artery,right popliteal artery,,right ant.tibial artery, right tibioperoneal trunk, and right post.tibial artery wna stent placement right popliteal artery and superfical femoral artery 7/2012       CURRENT MEDICATIONS    Current Outpatient Rx   Medication Sig Dispense Refill    ondansetron (ZOFRAN ODT) 4 MG disintegrating tablet Take 1 tablet by mouth every 8 hours as needed for Nausea 15 tablet 0    acetaminophen (TYLENOL) 325 MG tablet Take 2 tablets by mouth every 6 hours as needed for Pain 120 tablet 3    dicyclomine (BENTYL) 10 MG capsule Take 1 capsule by mouth 4 times daily (before meals and nightly) for 4 days abd pain 16 capsule 0    ondansetron (ZOFRAN ODT) 4 MG disintegrating tablet Take 1 tablet by mouth every 8 hours as needed for Nausea or Vomiting 10 tablet 0    pregabalin (LYRICA) 50 MG capsule Take 1 capsule by mouth 3 times daily for 30 days. 30 capsule 0    albuterol sulfate HFA (VENTOLIN HFA) 108 (90 Base) MCG/ACT inhaler Inhale 2 puffs into the lungs every 4 hours as needed for Wheezing 1 Inhaler 3    doxazosin (CARDURA) 2 MG tablet TAKE 1 TABLET BY MOUTH EVERY DAY IN THE EVENING 90 tablet 1    Calcium Alginate (ALGICELL CALCIUM DRESSING 4\"X8) MISC Apply 1 Device topically daily 30 each 3    PROMOGRAN COREY WOUND DRESSING MATRIX Apply topically Apply to left daily and cover with gauze 1 Package 3    amLODIPine (NORVASC) 10 MG tablet Take 10 mg by mouth daily      TRULICITY 1.53 KQ/0.3VU SOPN once a week       BD ULTRA-FINE PEN NEEDLES 29G X 12.7MM MISC       carvedilol (COREG) 25 MG tablet TAKE 1 TABLET BY MOUTH TWICE A DAY WITH MEALS 180 tablet 3    Blood Pressure KIT BID for record down in log book 1 kit 0    clopidogrel (PLAVIX) 75 MG tablet Take 1 tablet by mouth daily 30 tablet 11    gabapentin (NEURONTIN) 600 MG tablet Take 600 mg by mouth 3 times daily.  insulin glargine (LANTUS SOLOSTAR) 100 UNIT/ML injection pen Inject 40 Units into the skin nightly (Patient taking differently: Inject 44 Units into the skin nightly ) 5 Pen 3       ALLERGIES    Allergies   Allergen Reactions    Pcn [Penicillins] Hives    Fentanyl Itching       SOCIAL & FAMILY HISTORY    Social History     Socioeconomic History    Marital status:       Spouse name: kg/m²   Constitutional:  Well developed, Appears comfortable  HEENT:  Normocephalic, laceration nasal bridge with sutures in place. PERRL, EOMI. Ear canals, nasal passages, oropharynx clear of blood or clear fluid. Musculoskeletal:  No gross deformities. No motor deficits. Distal sensation and capillary refill intact. Vascular: Distal pulses and capillary refill intact. Integument:    There is well-healing laceration to nasal bridge with 3 sutures in place. Wound edges well approximated, small amount of overgrowth of sutures. No surrounding erythema, discharge or drainage from wound. Neurologic:  Awake and alert, normal flow of speech. CN 2-12 intact. Psychiatric: Cooperative, pleasant affect        RADIOLOGY/PROCEDURES        ________________________________________________________________________       Procedure Note - Zaid Britton PA-C      Suture/Staple Removal Procedure Note    Indication: Previous Suture/Staple  Repair    Procedure:   - Procedure explained, including risks and benefits explained to the patient who expressed understanding. All questions were answered. Verbal consent obtained. - The Wound was fully exposed revealing 3 sutures intact with wound edges well approximated without evidence of infection. Mild overgrowth of skin of sutures. - 3 sutures were removed with ease using sterile suture removal kit  - Patient tolerated procedure well without complications. Discussed with Pt today:  I discussed possibility of infection, retained foreign body, including suture / partial suture. Wound care and scar minimization education was provided. Instructions were given to return for development of  pain, redness, streaking, discharge, or any other worsening or worrisome concerns. ________________________________________________________________________              ED COURSE & MEDICAL DECISION MAKING       Vital signs and nursing notes reviewed during ED course.  I have independently evaluated this patient . Supervising MD present in the Emergency Department, available for consultation, throughout entirety of  patient care. Patient presents as above for suture removal completed as above. No evidence of infectious process, wound healing well. We discussed continued wound care and following up with primary care for wound recheck. He is to return with any signs of localized or systemic infection and he is agreeable with this. Patient is noted to be hypertensive on arrival.  This is attempted to be retaken, patient has pulled out blood pressure cuff, declined subsequent blood pressure measurements. Will encourage close follow-up with primary care provider for possible medication adjustments as he reports compliant with his blood pressure medicines. No chest pain, shortness of breath, visual changes, palpitations. All pertinent Lab data and radiographic results reviewed with patient at bedside. The patient and/or the family were informed of the results of any tests/labs/imaging, the treatment plan, and time was allotted to answer questions. Disposition, diagnosis, and plan discussed at bedside with patient and/or the family today who understands and agrees. Return to emergency department precautions were discussed in detail with patient and/or family who understands and agrees to return for new or worsening symptoms including but not limited to numbness, weakness, tingling, fever, chills, redness around wound, streaking redness proximal or distal to wound, purulent drainage from wound, nausea, vomiting, joint redness, joint swelling. Clinical  IMPRESSION    1. Visit for suture removal    2.  Elevated blood pressure reading           Comment: Please note this report has been produced using speech recognition software and may contain errors related to that system including errors in grammar, punctuation, and spelling, as well as words and phrases that may be

## 2020-11-06 NOTE — ED NOTES
Discharge instructions reviewed with patient. Patient verbalized understanding.        Chelita Castelan RN  11/06/20 [id number]

## 2020-11-22 PROCEDURE — 93297 REM INTERROG DEV EVAL ICPMS: CPT | Performed by: INTERNAL MEDICINE

## 2020-11-22 PROCEDURE — 93296 REM INTERROG EVL PM/IDS: CPT | Performed by: INTERNAL MEDICINE

## 2020-11-22 PROCEDURE — 93295 DEV INTERROG REMOTE 1/2/MLT: CPT | Performed by: INTERNAL MEDICINE

## 2020-11-24 ENCOUNTER — PROCEDURE VISIT (OUTPATIENT)
Dept: CARDIOLOGY CLINIC | Age: 70
End: 2020-11-24
Payer: MEDICARE

## 2020-11-24 ENCOUNTER — TELEPHONE (OUTPATIENT)
Dept: CARDIOLOGY CLINIC | Age: 70
End: 2020-11-24

## 2020-12-09 ENCOUNTER — APPOINTMENT (OUTPATIENT)
Dept: GENERAL RADIOLOGY | Age: 70
DRG: 291 | End: 2020-12-09
Payer: MEDICARE

## 2020-12-09 ENCOUNTER — HOSPITAL ENCOUNTER (INPATIENT)
Age: 70
LOS: 3 days | Discharge: HOME | DRG: 291 | End: 2020-12-13
Attending: EMERGENCY MEDICINE | Admitting: INTERNAL MEDICINE
Payer: MEDICARE

## 2020-12-09 LAB
ALBUMIN SERPL-MCNC: 3.3 GM/DL (ref 3.4–5)
ALP BLD-CCNC: 75 IU/L (ref 40–129)
ALT SERPL-CCNC: 7 U/L (ref 10–40)
ANION GAP SERPL CALCULATED.3IONS-SCNC: 6 MMOL/L (ref 4–16)
AST SERPL-CCNC: 13 IU/L (ref 15–37)
BASE EXCESS MIXED: 1.2 (ref 0–1.2)
BASE EXCESS: ABNORMAL (ref 0–3.3)
BASOPHILS ABSOLUTE: 0 K/CU MM
BASOPHILS RELATIVE PERCENT: 0.4 % (ref 0–1)
BILIRUB SERPL-MCNC: 0.9 MG/DL (ref 0–1)
BILIRUBIN DIRECT: 0.3 MG/DL (ref 0–0.3)
BILIRUBIN, INDIRECT: 0.6 MG/DL (ref 0–0.7)
BUN BLDV-MCNC: 23 MG/DL (ref 6–23)
CALCIUM SERPL-MCNC: 8.4 MG/DL (ref 8.3–10.6)
CHLORIDE BLD-SCNC: 106 MMOL/L (ref 99–110)
CO2 CONTENT: 31 MMOL/L (ref 19–24)
CO2: 30 MMOL/L (ref 21–32)
CREAT SERPL-MCNC: 2 MG/DL (ref 0.9–1.3)
DIFFERENTIAL TYPE: ABNORMAL
EOSINOPHILS ABSOLUTE: 0.1 K/CU MM
EOSINOPHILS RELATIVE PERCENT: 1.5 % (ref 0–3)
GFR AFRICAN AMERICAN: 40 ML/MIN/1.73M2
GFR NON-AFRICAN AMERICAN: 33 ML/MIN/1.73M2
GLUCOSE BLD-MCNC: 72 MG/DL (ref 70–99)
HCO3 VENOUS: 29.1 MMOL/L (ref 19–25)
HCT VFR BLD CALC: 36.9 % (ref 42–52)
HEMOGLOBIN: 11.1 GM/DL (ref 13.5–18)
IMMATURE NEUTROPHIL %: 0.3 % (ref 0–0.43)
LACTATE: 1.3 MMOL/L (ref 0.4–2)
LYMPHOCYTES ABSOLUTE: 1.5 K/CU MM
LYMPHOCYTES RELATIVE PERCENT: 21 % (ref 24–44)
MCH RBC QN AUTO: 29.2 PG (ref 27–31)
MCHC RBC AUTO-ENTMCNC: 30.1 % (ref 32–36)
MCV RBC AUTO: 97.1 FL (ref 78–100)
MONOCYTES ABSOLUTE: 0.4 K/CU MM
MONOCYTES RELATIVE PERCENT: 5.6 % (ref 0–4)
O2 SAT, VEN: 47.3 % (ref 50–70)
PCO2, VEN: 61.6 MMHG (ref 38–52)
PDW BLD-RTO: 14.6 % (ref 11.7–14.9)
PH VENOUS: 7.28 (ref 7.32–7.42)
PLATELET # BLD: 226 K/CU MM (ref 140–440)
PMV BLD AUTO: 10.3 FL (ref 7.5–11.1)
PO2, VEN: 29.7 MMHG (ref 28–48)
POTASSIUM SERPL-SCNC: 4.7 MMOL/L (ref 3.5–5.1)
PRO-BNP: 3168 PG/ML
RBC # BLD: 3.8 M/CU MM (ref 4.6–6.2)
SEGMENTED NEUTROPHILS ABSOLUTE COUNT: 5.1 K/CU MM
SEGMENTED NEUTROPHILS RELATIVE PERCENT: 71.2 % (ref 36–66)
SODIUM BLD-SCNC: 142 MMOL/L (ref 135–145)
SOURCE, BLOOD GAS: ABNORMAL
TOTAL IMMATURE NEUTOROPHIL: 0.02 K/CU MM
TOTAL PROTEIN: 6.9 GM/DL (ref 6.4–8.2)
TROPONIN T: 0.03 NG/ML
TROPONIN T: 0.03 NG/ML
WBC # BLD: 7.2 K/CU MM (ref 4–10.5)

## 2020-12-09 PROCEDURE — 80053 COMPREHEN METABOLIC PANEL: CPT

## 2020-12-09 PROCEDURE — 96374 THER/PROPH/DIAG INJ IV PUSH: CPT

## 2020-12-09 PROCEDURE — 83880 ASSAY OF NATRIURETIC PEPTIDE: CPT

## 2020-12-09 PROCEDURE — 99285 EMERGENCY DEPT VISIT HI MDM: CPT

## 2020-12-09 PROCEDURE — 82805 BLOOD GASES W/O2 SATURATION: CPT

## 2020-12-09 PROCEDURE — 85025 COMPLETE CBC W/AUTO DIFF WBC: CPT

## 2020-12-09 PROCEDURE — 96375 TX/PRO/DX INJ NEW DRUG ADDON: CPT

## 2020-12-09 PROCEDURE — 73630 X-RAY EXAM OF FOOT: CPT

## 2020-12-09 PROCEDURE — 84484 ASSAY OF TROPONIN QUANT: CPT

## 2020-12-09 PROCEDURE — 71045 X-RAY EXAM CHEST 1 VIEW: CPT

## 2020-12-09 PROCEDURE — 6360000002 HC RX W HCPCS: Performed by: EMERGENCY MEDICINE

## 2020-12-09 PROCEDURE — 82248 BILIRUBIN DIRECT: CPT

## 2020-12-09 PROCEDURE — 83605 ASSAY OF LACTIC ACID: CPT

## 2020-12-09 RX ORDER — MORPHINE SULFATE 4 MG/ML
4 INJECTION, SOLUTION INTRAMUSCULAR; INTRAVENOUS ONCE
Status: COMPLETED | OUTPATIENT
Start: 2020-12-09 | End: 2020-12-09

## 2020-12-09 RX ORDER — FUROSEMIDE 10 MG/ML
20 INJECTION INTRAMUSCULAR; INTRAVENOUS ONCE
Status: COMPLETED | OUTPATIENT
Start: 2020-12-09 | End: 2020-12-09

## 2020-12-09 RX ADMIN — FUROSEMIDE 20 MG: 10 INJECTION, SOLUTION INTRAMUSCULAR; INTRAVENOUS at 21:58

## 2020-12-09 RX ADMIN — MORPHINE SULFATE 4 MG: 4 INJECTION, SOLUTION INTRAMUSCULAR; INTRAVENOUS at 21:56

## 2020-12-09 ASSESSMENT — PAIN SCALES - GENERAL
PAINLEVEL_OUTOF10: 10
PAINLEVEL_OUTOF10: 10

## 2020-12-09 ASSESSMENT — PAIN DESCRIPTION - ORIENTATION: ORIENTATION: RIGHT;LEFT

## 2020-12-09 ASSESSMENT — PAIN DESCRIPTION - DESCRIPTORS: DESCRIPTORS: STABBING

## 2020-12-09 ASSESSMENT — PAIN DESCRIPTION - LOCATION: LOCATION: LEG;FOOT

## 2020-12-10 PROBLEM — I50.9 HEART FAILURE EXACERBATED BY SOTALOL (HCC): Status: ACTIVE | Noted: 2020-12-10

## 2020-12-10 LAB
ANION GAP SERPL CALCULATED.3IONS-SCNC: 6 MMOL/L (ref 4–16)
BUN BLDV-MCNC: 23 MG/DL (ref 6–23)
CALCIUM SERPL-MCNC: 8 MG/DL (ref 8.3–10.6)
CHLORIDE BLD-SCNC: 105 MMOL/L (ref 99–110)
CO2: 32 MMOL/L (ref 21–32)
CREAT SERPL-MCNC: 1.9 MG/DL (ref 0.9–1.3)
ESTIMATED AVERAGE GLUCOSE: 143 MG/DL
GFR AFRICAN AMERICAN: 43 ML/MIN/1.73M2
GFR NON-AFRICAN AMERICAN: 35 ML/MIN/1.73M2
GLUCOSE BLD-MCNC: 104 MG/DL (ref 70–99)
GLUCOSE BLD-MCNC: 133 MG/DL (ref 70–99)
GLUCOSE BLD-MCNC: 278 MG/DL (ref 70–99)
GLUCOSE BLD-MCNC: 78 MG/DL (ref 70–99)
GLUCOSE BLD-MCNC: 82 MG/DL (ref 70–99)
GLUCOSE BLD-MCNC: 84 MG/DL (ref 70–99)
GLUCOSE BLD-MCNC: 85 MG/DL (ref 70–99)
GLUCOSE BLD-MCNC: 90 MG/DL (ref 70–99)
GLUCOSE BLD-MCNC: 91 MG/DL (ref 70–99)
HBA1C MFR BLD: 6.6 % (ref 4.2–6.3)
IRON: 25 UG/DL (ref 59–158)
MAGNESIUM: 2.3 MG/DL (ref 1.8–2.4)
PCT TRANSFERRIN: 13 % (ref 10–44)
POTASSIUM SERPL-SCNC: 4.6 MMOL/L (ref 3.5–5.1)
SODIUM BLD-SCNC: 143 MMOL/L (ref 135–145)
TOTAL IRON BINDING CAPACITY: 194 UG/DL (ref 250–450)
TROPONIN T: 0.03 NG/ML
TROPONIN T: 0.04 NG/ML
TROPONIN T: 0.05 NG/ML
UNSATURATED IRON BINDING CAPACITY: 169 UG/DL (ref 110–370)

## 2020-12-10 PROCEDURE — 99211 OFF/OP EST MAY X REQ PHY/QHP: CPT

## 2020-12-10 PROCEDURE — 6360000002 HC RX W HCPCS: Performed by: NURSE PRACTITIONER

## 2020-12-10 PROCEDURE — 83735 ASSAY OF MAGNESIUM: CPT

## 2020-12-10 PROCEDURE — 84484 ASSAY OF TROPONIN QUANT: CPT

## 2020-12-10 PROCEDURE — 83550 IRON BINDING TEST: CPT

## 2020-12-10 PROCEDURE — 6370000000 HC RX 637 (ALT 250 FOR IP): Performed by: NURSE PRACTITIONER

## 2020-12-10 PROCEDURE — 80048 BASIC METABOLIC PNL TOTAL CA: CPT

## 2020-12-10 PROCEDURE — 82962 GLUCOSE BLOOD TEST: CPT

## 2020-12-10 PROCEDURE — 2580000003 HC RX 258: Performed by: NURSE PRACTITIONER

## 2020-12-10 PROCEDURE — 36415 COLL VENOUS BLD VENIPUNCTURE: CPT

## 2020-12-10 PROCEDURE — 2140000000 HC CCU INTERMEDIATE R&B

## 2020-12-10 PROCEDURE — 83036 HEMOGLOBIN GLYCOSYLATED A1C: CPT

## 2020-12-10 PROCEDURE — 83540 ASSAY OF IRON: CPT

## 2020-12-10 PROCEDURE — 6370000000 HC RX 637 (ALT 250 FOR IP): Performed by: INTERNAL MEDICINE

## 2020-12-10 RX ORDER — CLOPIDOGREL BISULFATE 75 MG/1
75 TABLET ORAL DAILY
Status: DISCONTINUED | OUTPATIENT
Start: 2020-12-10 | End: 2020-12-13 | Stop reason: HOSPADM

## 2020-12-10 RX ORDER — METOLAZONE 5 MG/1
5 TABLET ORAL DAILY
Status: ON HOLD | COMMUNITY
End: 2020-12-27 | Stop reason: HOSPADM

## 2020-12-10 RX ORDER — ACETAMINOPHEN 650 MG/1
650 SUPPOSITORY RECTAL EVERY 6 HOURS PRN
Status: DISCONTINUED | OUTPATIENT
Start: 2020-12-10 | End: 2020-12-13 | Stop reason: HOSPADM

## 2020-12-10 RX ORDER — NICOTINE POLACRILEX 4 MG
15 LOZENGE BUCCAL PRN
Status: DISCONTINUED | OUTPATIENT
Start: 2020-12-10 | End: 2020-12-13 | Stop reason: HOSPADM

## 2020-12-10 RX ORDER — FAMOTIDINE 20 MG/1
20 TABLET, FILM COATED ORAL DAILY
Status: DISCONTINUED | OUTPATIENT
Start: 2020-12-10 | End: 2020-12-13 | Stop reason: HOSPADM

## 2020-12-10 RX ORDER — AMLODIPINE BESYLATE 10 MG/1
10 TABLET ORAL DAILY
Status: DISCONTINUED | OUTPATIENT
Start: 2020-12-10 | End: 2020-12-11

## 2020-12-10 RX ORDER — TORSEMIDE 100 MG/1
100 TABLET ORAL 2 TIMES DAILY
COMMUNITY
End: 2020-12-14

## 2020-12-10 RX ORDER — METOLAZONE 2.5 MG/1
5 TABLET ORAL DAILY
Status: DISCONTINUED | OUTPATIENT
Start: 2020-12-10 | End: 2020-12-10

## 2020-12-10 RX ORDER — CARVEDILOL 3.12 MG/1
3.12 TABLET ORAL 2 TIMES DAILY
Status: DISCONTINUED | OUTPATIENT
Start: 2020-12-10 | End: 2020-12-13 | Stop reason: HOSPADM

## 2020-12-10 RX ORDER — FUROSEMIDE 10 MG/ML
40 INJECTION INTRAMUSCULAR; INTRAVENOUS 2 TIMES DAILY
Status: DISCONTINUED | OUTPATIENT
Start: 2020-12-10 | End: 2020-12-11

## 2020-12-10 RX ORDER — DEXTROSE MONOHYDRATE 25 G/50ML
12.5 INJECTION, SOLUTION INTRAVENOUS PRN
Status: DISCONTINUED | OUTPATIENT
Start: 2020-12-10 | End: 2020-12-13 | Stop reason: HOSPADM

## 2020-12-10 RX ORDER — GABAPENTIN 600 MG/1
600 TABLET ORAL 3 TIMES DAILY
Status: DISCONTINUED | OUTPATIENT
Start: 2020-12-10 | End: 2020-12-13 | Stop reason: HOSPADM

## 2020-12-10 RX ORDER — DOXAZOSIN MESYLATE 1 MG/1
2 TABLET ORAL DAILY
Status: DISCONTINUED | OUTPATIENT
Start: 2020-12-10 | End: 2020-12-13 | Stop reason: HOSPADM

## 2020-12-10 RX ORDER — ALGINATE DRESSING 2" X 2"
1 BANDAGE TOPICAL DAILY
Status: DISCONTINUED | OUTPATIENT
Start: 2020-12-10 | End: 2020-12-13 | Stop reason: HOSPADM

## 2020-12-10 RX ORDER — PROMETHAZINE HYDROCHLORIDE 12.5 MG/1
12.5 TABLET ORAL EVERY 6 HOURS PRN
Status: DISCONTINUED | OUTPATIENT
Start: 2020-12-10 | End: 2020-12-13 | Stop reason: HOSPADM

## 2020-12-10 RX ORDER — POLYETHYLENE GLYCOL 3350 17 G/17G
17 POWDER, FOR SOLUTION ORAL DAILY PRN
Status: DISCONTINUED | OUTPATIENT
Start: 2020-12-10 | End: 2020-12-13 | Stop reason: HOSPADM

## 2020-12-10 RX ORDER — ACETAMINOPHEN 325 MG/1
650 TABLET ORAL EVERY 6 HOURS PRN
Status: DISCONTINUED | OUTPATIENT
Start: 2020-12-10 | End: 2020-12-13 | Stop reason: HOSPADM

## 2020-12-10 RX ORDER — LOSARTAN POTASSIUM 100 MG/1
100 TABLET ORAL DAILY
Status: ON HOLD | COMMUNITY
End: 2020-12-13 | Stop reason: HOSPADM

## 2020-12-10 RX ORDER — SODIUM CHLORIDE 0.9 % (FLUSH) 0.9 %
10 SYRINGE (ML) INJECTION EVERY 12 HOURS SCHEDULED
Status: DISCONTINUED | OUTPATIENT
Start: 2020-12-10 | End: 2020-12-13 | Stop reason: HOSPADM

## 2020-12-10 RX ORDER — DEXTROSE MONOHYDRATE 50 MG/ML
100 INJECTION, SOLUTION INTRAVENOUS PRN
Status: DISCONTINUED | OUTPATIENT
Start: 2020-12-10 | End: 2020-12-13 | Stop reason: HOSPADM

## 2020-12-10 RX ORDER — SODIUM CHLORIDE 0.9 % (FLUSH) 0.9 %
10 SYRINGE (ML) INJECTION PRN
Status: DISCONTINUED | OUTPATIENT
Start: 2020-12-10 | End: 2020-12-13 | Stop reason: HOSPADM

## 2020-12-10 RX ORDER — TRAMADOL HYDROCHLORIDE 50 MG/1
50 TABLET ORAL ONCE
Status: COMPLETED | OUTPATIENT
Start: 2020-12-10 | End: 2020-12-10

## 2020-12-10 RX ORDER — GUANFACINE 1 MG/1
1 TABLET ORAL NIGHTLY
Status: ON HOLD | COMMUNITY
End: 2020-12-13 | Stop reason: HOSPADM

## 2020-12-10 RX ORDER — NITROGLYCERIN 0.4 MG/1
0.4 TABLET SUBLINGUAL EVERY 5 MIN PRN
Status: DISCONTINUED | OUTPATIENT
Start: 2020-12-10 | End: 2020-12-13 | Stop reason: HOSPADM

## 2020-12-10 RX ORDER — ACETAMINOPHEN 325 MG/1
325 TABLET ORAL EVERY 6 HOURS PRN
Status: DISCONTINUED | OUTPATIENT
Start: 2020-12-10 | End: 2020-12-10 | Stop reason: SDUPTHER

## 2020-12-10 RX ORDER — ONDANSETRON 2 MG/ML
4 INJECTION INTRAMUSCULAR; INTRAVENOUS EVERY 6 HOURS PRN
Status: DISCONTINUED | OUTPATIENT
Start: 2020-12-10 | End: 2020-12-13 | Stop reason: HOSPADM

## 2020-12-10 RX ORDER — DICYCLOMINE HYDROCHLORIDE 10 MG/1
10 CAPSULE ORAL
Status: DISCONTINUED | OUTPATIENT
Start: 2020-12-10 | End: 2020-12-13 | Stop reason: HOSPADM

## 2020-12-10 RX ORDER — ALBUTEROL SULFATE 90 UG/1
2 AEROSOL, METERED RESPIRATORY (INHALATION) EVERY 4 HOURS PRN
Status: DISCONTINUED | OUTPATIENT
Start: 2020-12-10 | End: 2020-12-13 | Stop reason: HOSPADM

## 2020-12-10 RX ORDER — ONDANSETRON 4 MG/1
4 TABLET, ORALLY DISINTEGRATING ORAL EVERY 8 HOURS PRN
Status: DISCONTINUED | OUTPATIENT
Start: 2020-12-10 | End: 2020-12-13 | Stop reason: HOSPADM

## 2020-12-10 RX ORDER — METOLAZONE 2.5 MG/1
5 TABLET ORAL 2 TIMES DAILY
Status: DISCONTINUED | OUTPATIENT
Start: 2020-12-10 | End: 2020-12-11

## 2020-12-10 RX ADMIN — ENOXAPARIN SODIUM 40 MG: 40 INJECTION SUBCUTANEOUS at 08:36

## 2020-12-10 RX ADMIN — GABAPENTIN 600 MG: 600 TABLET, FILM COATED ORAL at 08:37

## 2020-12-10 RX ADMIN — ONDANSETRON 4 MG: 2 INJECTION INTRAMUSCULAR; INTRAVENOUS at 13:08

## 2020-12-10 RX ADMIN — FUROSEMIDE 40 MG: 10 INJECTION, SOLUTION INTRAVENOUS at 17:56

## 2020-12-10 RX ADMIN — TRAMADOL HYDROCHLORIDE 50 MG: 50 TABLET, FILM COATED ORAL at 12:14

## 2020-12-10 RX ADMIN — FUROSEMIDE 40 MG: 10 INJECTION, SOLUTION INTRAVENOUS at 08:36

## 2020-12-10 RX ADMIN — CLOPIDOGREL BISULFATE 75 MG: 75 TABLET ORAL at 08:37

## 2020-12-10 RX ADMIN — GABAPENTIN 600 MG: 600 TABLET, FILM COATED ORAL at 20:15

## 2020-12-10 RX ADMIN — AMLODIPINE BESYLATE 10 MG: 10 TABLET ORAL at 08:37

## 2020-12-10 RX ADMIN — SODIUM CHLORIDE, PRESERVATIVE FREE 10 ML: 5 INJECTION INTRAVENOUS at 20:17

## 2020-12-10 RX ADMIN — GABAPENTIN 600 MG: 600 TABLET, FILM COATED ORAL at 14:20

## 2020-12-10 RX ADMIN — FAMOTIDINE 20 MG: 20 TABLET, FILM COATED ORAL at 08:37

## 2020-12-10 RX ADMIN — INSULIN GLARGINE 44 UNITS: 100 INJECTION, SOLUTION SUBCUTANEOUS at 20:15

## 2020-12-10 RX ADMIN — DICYCLOMINE HYDROCHLORIDE 10 MG: 10 CAPSULE ORAL at 20:15

## 2020-12-10 RX ADMIN — SODIUM CHLORIDE, PRESERVATIVE FREE 10 ML: 5 INJECTION INTRAVENOUS at 17:56

## 2020-12-10 RX ADMIN — DOXAZOSIN 2 MG: 1 TABLET ORAL at 08:37

## 2020-12-10 RX ADMIN — INSULIN LISPRO 5 UNITS: 100 INJECTION, SOLUTION INTRAVENOUS; SUBCUTANEOUS at 20:16

## 2020-12-10 RX ADMIN — METOLAZONE 5 MG: 2.5 TABLET ORAL at 17:30

## 2020-12-10 RX ADMIN — CARVEDILOL 3.12 MG: 3.12 TABLET, FILM COATED ORAL at 08:37

## 2020-12-10 RX ADMIN — DICYCLOMINE HYDROCHLORIDE 10 MG: 10 CAPSULE ORAL at 17:30

## 2020-12-10 RX ADMIN — METOLAZONE 5 MG: 2.5 TABLET ORAL at 09:18

## 2020-12-10 RX ADMIN — SODIUM CHLORIDE, PRESERVATIVE FREE 10 ML: 5 INJECTION INTRAVENOUS at 08:37

## 2020-12-10 RX ADMIN — DICYCLOMINE HYDROCHLORIDE 10 MG: 10 CAPSULE ORAL at 12:14

## 2020-12-10 RX ADMIN — CARVEDILOL 3.12 MG: 3.12 TABLET, FILM COATED ORAL at 20:15

## 2020-12-10 ASSESSMENT — PAIN SCALES - GENERAL
PAINLEVEL_OUTOF10: 5
PAINLEVEL_OUTOF10: 0
PAINLEVEL_OUTOF10: 5

## 2020-12-10 ASSESSMENT — PAIN DESCRIPTION - PROGRESSION
CLINICAL_PROGRESSION: NOT CHANGED
CLINICAL_PROGRESSION: NOT CHANGED

## 2020-12-10 ASSESSMENT — PAIN DESCRIPTION - LOCATION
LOCATION: GROIN
LOCATION: GROIN

## 2020-12-10 ASSESSMENT — PAIN - FUNCTIONAL ASSESSMENT
PAIN_FUNCTIONAL_ASSESSMENT: PREVENTS OR INTERFERES SOME ACTIVE ACTIVITIES AND ADLS
PAIN_FUNCTIONAL_ASSESSMENT: PREVENTS OR INTERFERES SOME ACTIVE ACTIVITIES AND ADLS

## 2020-12-10 ASSESSMENT — PAIN DESCRIPTION - FREQUENCY
FREQUENCY: INTERMITTENT
FREQUENCY: INTERMITTENT

## 2020-12-10 ASSESSMENT — PAIN DESCRIPTION - PAIN TYPE
TYPE: ACUTE PAIN
TYPE: ACUTE PAIN

## 2020-12-10 ASSESSMENT — PAIN DESCRIPTION - DESCRIPTORS
DESCRIPTORS: TENDER;DISCOMFORT
DESCRIPTORS: TENDER;DISCOMFORT

## 2020-12-10 ASSESSMENT — PAIN DESCRIPTION - ORIENTATION: ORIENTATION: RIGHT;LEFT

## 2020-12-10 NOTE — ED TRIAGE NOTES
Arrived to room 7-1 for triage via wheelchair. Tolerated without difficulty. Bed in lowest position. Call light given. Gowned for exam, placed on cardiac monitor.

## 2020-12-10 NOTE — CONSULTS
[address] [id number] Continence Nurse  Consult Note       Yvonne Patel  AGE: 79 y.o. GENDER: male  : 1950  TODAY'S DATE:  12/10/2020    Subjective:     Reason for  Evaluation and Assessment: wound care marko Patel is a 79 y.o. male referred by:   [x] Physician  [] Nursing  [] Other:     Wound Identification:  Wound Type: arterial and diabetic  Contributing Factors: edema, diabetes, obesity and arterial insufficiency        PAST MEDICAL HISTORY        Diagnosis Date    Acid reflux     Acute MI ([facility] ,     Arthritis     Back    Broken teeth     Upper Front    CAD (coronary artery disease)     Sees Dr. Cristi Holder [facility])     per old chart    CH[facility]congest[facility]     Chronic back pain     Chronic kidney disease     STAGE 3 KIDNEY FAILURE- \"from my diabetes- do not follow with any one- have seen Dr Mercedes Miller in the past\"    Diabetes mellitus ([facility] Dx 1965    per old chart pt has been diabetic since age 13    Diabetic neuropathy ([facility]     \"in my feet\"    H/O cardiovascular stress test 2016    H/O Doppler ultrasound 2018    Moderate disease of the right lower extremity with an JALEN of 0.72.   Moderate to severe disease of the left lower extremity with an JALEN of 0.55.   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    Neuropathy     both feet    neuropathy     PAD (peripheral artery disease) ([facility] 2018    PVD (peripheral vascular disease) ([facility]     Sick sinus syndrome ([facility]     Sleep apnea     \"sleep study 3 yrs ago- could not tolerate the cpap made me too dry\"    Spinal stenosis     Teeth missing     Upper And Lower    Type 2 diabetes mellitus without complication ([facility]        PAST SURGICAL HISTORY    Past Surgical History:   Procedure Laterality Date    CARDIAC CATHETERIZATION      per old chart done 10/2014    CARDIAC CATHETERIZATION  07/14/2017    with angiography of leg    CARDIAC CATHETERIZATION  11/20/2018    PATENT STENTS OF ALL THREE MAJOR VESSELS    CARDIAC DEFIBRILLATOR PLACEMENT  06/04/2010    [employer] [address] DR Defibrillator Implanted    COLECTOMY Right 08/26/2016    laparascopic; robotic assisted    COLONOSCOPY  08/04/2016    CORONARY ANGIOPLASTY      \"15 Heart Stents\"    CORONARY ANGIOPLASTY WITH STENT PLACEMENT      per old chart had angio with stent to circumflex and obtuse marginal artery at [facility] 5/2010( old chart also gives hx of stent placement done 2000,2004 and 2005)   [address]      Teeth Extracted In Past    PACEMAKER PLACEMENT  06/04/2010    [employer]onic Secura DR Defibrillator Implanted    TOE AMPUTATION Right 09/12/2017    Rt 3rd toe    TOE AMPUTATION Right 01/09/2018     Right 5th toe amputation and Toenails trimmed left 2,3,4 and 5th toes    VASCULAR SURGERY      per old chart had balloon angioplasty right superfical femoral artery,right popliteal artery,,right ant.tibial artery, right tibioperoneal trunk, and right post.tibial artery wna stent placement right popliteal artery and superfical femoral artery 7/2012       FAMILY HISTORY    Family History   Problem Relation Age of Onset    Diabetes Mother     Stroke Mother     High Blood Pressure Mother     Vision Loss Mother     Cancer Father         Prostate Cancer    Diabetes Sister     Neuropathy Sister     Other Sister         \"Breathing Problems\"    Heart Disease Sister     Early Death Sister 62        Heart Complications    Cancer Brother         \"Stomach Cancer\"    High Blood Pressure Brother     Diabetes Brother     Heart Disease Brother     High Blood Pressure Brother     Cancer Son         \"Testicle Cancer\"       SOCIAL HISTORY    Social History     Tobacco Use    Smoking status: Former Smoker     Packs/day: 0.00     Years: 36.00     Pack years: 0.00     Types: Cigars     Start date: 1/1/1980    Smokeless tobacco: Never Used   Substance Use Topics    Alcohol use: No     Frequency: Never    Drug use: Yes     Types: Marijuana       ALLERGIES    Allergies   Allergen Reactions    Pcn [Penicillins] Hives    Fentanyl Itching       MEDICATIONS    No current facility-administered medications on file prior to encounter. Current Outpatient Medications on File Prior to Encounter   Medication Sig Dispense Refill    guanFACINE (TENEX) 1 MG tablet Take 1 mg by mouth nightly      metOLazone (ZAROXOLYN) 5 MG tablet Take 5 mg by mouth daily      torsemide (DEMADEX) 100 MG tablet Take 100 mg by mouth 2 times daily      losartan (COZAAR) 100 MG tablet Take 100 mg by mouth daily      acetaminophen (TYLENOL) 325 MG tablet Take 2 tablets by mouth every 6 hours as needed for Pain 120 tablet 3    doxazosin (CARDURA) 2 MG tablet TAKE 1 TABLET BY MOUTH EVERY DAY IN THE EVENING 90 tablet 1    Calcium Alginate (ALGICELL CALCIUM DRESSING 4\"X8) MISC Apply 1 Device topically daily 30 each 3    TRULICITY 5.69 RS/8.2HY SOPN once a week       carvedilol (COREG) 25 MG tablet TAKE 1 TABLET BY MOUTH TWICE A DAY WITH MEALS 180 tablet 3    clopidogrel (PLAVIX) 75 MG tablet Take 1 tablet by mouth daily 30 tablet 11    gabapentin (NEURONTIN) 600 MG tablet Take 600 mg by mouth 3 times daily.  insulin glargine (LANTUS SOLOSTAR) 100 UNIT/ML injection pen Inject 40 Units into the skin nightly (Patient taking differently: Inject 44 Units into the skin nightly ) 5 Pen 3    pregabalin (LYRICA) 50 MG capsule Take 1 capsule by mouth 3 times daily for 30 days.  30 capsule 0    albuterol sulfate HFA (VENTOLIN HFA) 108 (90 Base) MCG/ACT inhaler Inhale 2 puffs into the lungs  Obesity, Class I, BMI 30-34.9    Postoperative hypertension    S/P partial colectomy    Tubulovillous adenoma of colon    Microalbuminuria    PVD (peripheral vascular disease) (HCC)    Limb ischemia    Necrotic toes (HCC)    Toe gangrene (HCC)    Diabetic foot infection (HCC)    Chronic kidney disease (CKD) stage G3a/A2, moderately decreased glomerular filtration rate (GFR) between 45-59 mL/min/1.73 square meter and albuminuria creatinine ratio between  mg/g    DM (diabetes mellitus) (HCC)    Edema    ICD (implantable cardioverter-defibrillator) battery depletion    Hyperkalemia    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HCC)    Ischemia of toe    Acute kidney injury (HCC)    Fluid overload    DM (diabetes mellitus) ([address]    Hypertensive emergency    Precordial pain    Acute chest pain    Unstable angina (HCC)    Chronic kidney disease (CKD) stage G3a/A3, moderately decreased glomerular filtration rate (GFR) between 45-59 mL/min/1.73 square meter and albuminuria creatinine ratio greater than 300 mg/g    Cardiomyopathy ([address]    Hypertensive crisis    Diabetic neuropathy ([address]    HTN (hypertension)    Epigastric pain    Acute on chronic heart failure with reduced ejection fraction and diastolic dysfunction ([address]    Heart failure exacerbated by sotalol ([address]       Measurements:  Wound 07/06/17 Other (Comment) Toe (Comment  which one) (Active)   Number of days: 1252       Incision 01/09/18 Foot Right (Active)   Number of days: 1066       Wound 12/10/20 Left great toe  (Active)   Wound Etiology Diabetic 12/10/20 1500   Dressing Status New dressing applied 12/10/20 1500   Wound Cleansed Cleansed with saline 12/10/20 1500   Dressing/Treatment Betadine swabs/povidone iodine 12/10/20 1500   Wound Length (cm) 2 cm 12/10/20 1500   Wound Width (cm) 2 cm 12/10/20 1500   Wound Depth (cm) 0.1 cm 12/10/20 1500   Wound Surface Area (cm^2) 4 cm^2 12/10/20 1500   Wound Volume (cm^3) 0.4 cm^3 12/10/20 1500   Distance Volume (cm^3) 0 cm^3 12/10/20 1500   Distance Tunneling (cm) 0 cm 12/10/20 1500   Tunneling Position ___ O'Clock 0 12/10/20 1500   Undermining Starts ___ O'Clock 0 12/10/20 1500   Undermining Ends___ O'Clock 0 12/10/20 1500   Undermining Maxium Distance (cm) 0 12/10/20 1500   Drainage Amount None 12/10/20 1500   Odor None 12/10/20 1500   Dina-wound Assessment Dry/flaky 12/10/20 1500   Margins Attached edges 12/10/20 1500   Number of days: 0       Response to treatment:  Well tolerated by patient. Pain Assessment:  Severity:  0  Quality of pain: none  Wound Pain Timing/Severity:   Premedicated:     Plan:     Plan of Care: Wound 12/10/20 Left great toe -Dressing/Treatment: Betadine swabs/povidone iodine(abd kerlix tape)  Wound 12/10/20 Foot Left;Plantar-Dressing/Treatment: (aquacel ag abd kerlix tape )  Wound 12/10/20 Foot Left;Lateral fluid filled blister-Dressing/Treatment: (optifoam ag kerlix tape )     Pt in bed agreeable to wound care eval.  Pt has chronic wound to left great toe is dry black/necrotic with some purulent drainage coming out the edges when pushed. Pt stated he see's dr Johnny Martinez for his toe. Open wound noted to left plantar under 2nd toe macerated cleansed with NS measured and pictured dressing as above. There is a large blister to the lateral foot covered with optifoam ag to protect. Rt leg and foot intact. Pt does have edema to both legs. Buttocks is intact. Discussed with pt's nurse a  surgical consult to eval great toe. Pt is at mild risk for skin breakdown AEB brett score. Observe brett orders.      Specialty Bed Required :  yes  [] Low Air Loss   [x] Pressure Redistribution  [] Fluid Immersion  [] Bariatric  [] Total Pressure Relief  [] Other:     Discharge Plan:  Placement for patient upon discharge: tbd  Hospice Care: no  Patient appropriate for Outpatient [address]:  tbd pt was seeing dr Johnny Martinez    Patient/Caregiver Teaching:  Level of patient/caregiver understanding able to:  Sheila Childers explained as given. Electronically signed by Lisandra Orosco.  IFEOMA Miranda, on 12/10/2020 at 5:21 PM

## 2020-12-10 NOTE — ED NOTES
BIPAP initiated : Alexander Christina, respiratory therapist consulted via telephone)  IPAP 14  EPAP 7   FiO2 40%       Aubrey Kerns RN  12/09/20 [id number]

## 2020-12-10 NOTE — H&P
History and Physical      Name:  Malik Loza /Age/Sex: 1950  (79 y.o. male)   MRN & CSN:  [id number] & [id number] Admission Date/Time: 2020  7:58 PM   Location:   PCP: Debra Wilson Day: 2    Assessment and Plan:   Malik Loza is a 79 y.o.  male  who presents with     1. Heart Failure- BNP 3,168. CXR reveals cardiomegaly. Initial troponin elevated at 0.030, most likely due to atrial stretch from heart failure. ECHO. Cardiology consult. 2. Chronic Kidney Disease- SCr 2.0. Hold nephrotoxic medication. Use IVF sparingly. Continue   3. Diabetes Mellitus Type 2 with neuropathy- On gabapentin. POCT blood glucose four times daily. HgbA1C. High SS insulin coverage algorithm. Continue Lantus and Trulicity. 4. Obstructive Sleep Apnea- BIPAP. Repeat ABG in am  5. Hypertension- BP: (137-174)/(58-79)  Continue amlodipine   6. CAD- on clopidpgrel. 3 stents in three major cardiac vessels, most recent in 2018. Diet Low Sodium   DVT Prophylaxis [] Lovenox, [x]  Heparin, [] SCDs, [] Ambulation   GI Prophylaxis [] PPI,  [x] H2 Blocker,  [] Carafate,  [] Diet/Tube Feeds   Code Status FULL   Disposition Patient requires continued admission due to need for further evaluation and management of diastolic heart failure   MDM [] Low, [] Moderate,[x]  High  Patient's risk as above due to  complexity of medical data reviewed and treatment options available      History of Present Illness:     Chief Complaint   Patient presents with    Leg Swelling     C/o bilateral leg swelling for the past 3 days. Patient states his testicles are starting to swell now and minimal shortness of breath.  Patient has CKD and wound on the left foot (Dr. Horace Joy)       Malik Loza is a 79 y.o.  male  who presents with bilateral leg swelling        Ten point ROS reviewed negative, unless as noted above    Objective:   No intake or output data in the 24 hours ending 12/10/20 0020   Vitals:   Vitals:    12/10/20 0003   BP:    Pulse: 61   Resp: 10   Temp:    SpO2: (!) 87%     Physical Exam:   GEN Awake male, sitting upright in bed in no apparent distress. Appears given age. EYES Pupils are equally round. No scleral erythema, discharge, or conjunctivitis. HENT Mucous membranes are moist. Oral pharynx without exudates, no evidence of thrush. NECK Supple, no apparent thyromegaly or masses. RESP Decreased breath sounds bilaterally, no wheezes, rales or rhonchi. Symmetric chest movement while on room air. On BIPAP  CARDIO/VASC S1/S2 auscultated. Regular rate without appreciable murmurs, rubs, or gallops. No JVD or carotid bruits. Peripheral pulses equal bilaterally and palpable. +2 pitting edema from tops of feet to scrotum. Unna boots bilaterally. GI Abdomen is soft without significant tenderness, masses, or guarding. Bowel sounds are normoactive. Rectal exam deferred.  No costovertebral angle tenderness. Normal appearing external genitalia. Fuller catheter is not present. HEME/LYMPH No palpable cervical lymphadenopathy and no hepatosplenomegaly. No petechiae or ecchymoses. MSK No gross joint deformities. SKIN Normal coloration, warm, dry. NEURO Cranial nerves appear grossly intact, normal speech, no lateralizing weakness. PSYCH Awake, alert, oriented x 4. Affect appropriate.     Past Medical History:      Past Medical History:   Diagnosis Date    Acid reflux     Acute MI ([address] 2004, 2008    Arthritis     Back    Broken teeth     Upper Front    CAD (coronary artery disease)     Sees Dr. Gentry [facility])     per old chart    CHF (congestive heart failure) ([address]     Chronic back pain     Chronic kidney disease     STAGE 3 KIDNEY FAILURE- \"from my diabetes- do not follow with any one- have seen Dr Nawaf Knowles in the past\"    Diabetes mellitus ([address] Dx 1965    per old chart pt has been diabetic since age 13    Diabetic neuropathy ([address]     \"in my feet\"    H/O cardiovascular stress test 08/25/2016    H/O Doppler ultrasound 09/27/2018    Moderate disease of the right lower extremity with an JALEN of 0.72. Moderate to severe disease of the left lower extremity with an JALEN of 0.55.   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    Neuropathy     both feet    neuropathy     PAD (peripheral artery disease) ([facility] 09/27/2018    PVD (peripheral vascular disease) ([facility]     Sick sinus syndrome ([facility]     Sleep apnea     \"sleep study 3 yrs ago- could not tolerate the cpap made me too dry\"    Spinal stenosis     Teeth missing     Upper And Lower    Type 2 diabetes mellitus without complication ([facility]      PSHX:  has a past surgical history that includes Coronary angioplasty with stent; Dental surgery; Colonoscopy (08/04/2016); pacemaker placement (06/04/2010); vascular surgery; colectomy (Right, 08/26/2016); Toe amputation (Right, 09/12/2017); Toe amputation (Right, 01/09/2018); Cardiac catheterization; Cardiac defibrillator placement (06/04/2010); Coronary angioplasty; Cardiac catheterization (07/14/2017); and Cardiac catheterization (11/20/2018). Allergies: Allergies   Allergen Reactions    Pcn [Penicillins] Hives    Fentanyl Itching       FAM HX: family history includes Cancer in his brother, father, and son; Diabetes in his brother, mother, and sister; Early Death (age of onset: 62) in his sister; Heart Disease in his brother and sister; High Blood Pressure in his brother, brother, and mother; Neuropathy in his sister; Other in his sister; Stroke in his mother; Vision Loss in his mother.   Soc HX:   Social History Socioeconomic History    Marital status:      Spouse name: None    Number of children: None    Years of education: None    Highest education level: None   Occupational History    None   Social Needs    Financial resource strain: None    Food insecurity     Worry: None     Inability: None    Transportation needs     Medical: None     Non-medical: None   Tobacco Use    Smoking status: Former Smoker     Packs/day: 0.00     Years: 36.00     Pack years: 0.00     Types: Cigars     Start date: 1/1/1980    Smokeless tobacco: Never Used   Substance and Sexual Activity    Alcohol use: No     Frequency: Never    Drug use: Yes     Types: Marijuana    Sexual activity: Never   Lifestyle    Physical activity     Days per week: None     Minutes per session: None    Stress: None   Relationships    Social connections     Talks on phone: None     Gets together: None     Attends Voodoo service: None     Active member of club or organization: None     Attends meetings of clubs or organizations: None     Relationship status: None    Intimate partner violence     Fear of current or ex partner: None     Emotionally abused: None     Physically abused: None     Forced sexual activity: None   Other Topics Concern    None   Social History Narrative    None       Medications:     Prior to Admission medications    Medication Sig Start Date End Date Taking?  Authorizing Provider   ondansetron (ZOFRAN ODT) 4 MG disintegrating tablet Take 1 tablet by mouth every 8 hours as needed for Nausea 10/28/20   Aquilino Mow, DO   acetaminophen (TYLENOL) 325 MG tablet Take 2 tablets by mouth every 6 hours as needed for Pain 10/28/20   Aquilino Mow, DO   dicyclomine (BENTYL) 10 MG capsule Take 1 capsule by mouth 4 times daily (before meals and nightly) for 4 days abd pain 10/24/20 10/28/20  Peter John MD   ondansetron (ZOFRAN ODT) 4 MG disintegrating tablet Take 1 tablet by mouth every 8 hours as needed for Nausea or Vomiting 10/24/20   Benito Lucia MD   pregabalin (LYRICA) 50 MG capsule Take 1 capsule by mouth 3 times daily for 30 days. 9/21/20 10/21/20  Renetta Mistry MD   albuterol sulfate HFA (VENTOLIN HFA) 108 (90 Base) MCG/ACT inhaler Inhale 2 puffs into the lungs every 4 hours as needed for Wheezing 8/14/20   Mita Jim MD   doxazosin (CARDURA) 2 MG tablet TAKE 1 TABLET BY MOUTH EVERY DAY IN THE EVENING 6/15/20   Renetta Mistry MD   Calcium Alginate (ALGICELL CALCIUM DRESSING 4\"X8) MISC Apply 1 Device topically daily 3/20/20   Rashmi Ingram MD   [facility] COREY WOUND DRESSING MATRIX Apply topically Apply to left daily and cover with gauze 3/19/20   Vianey Arellano MD   amLODIPine (NORVASC) 10 MG tablet Take 10 mg by mouth daily    Historical Provider, MD   TRULICITY 0.77 FX/8.5GC SOPN once a week  1/5/20   Historical Provider, MD   BD ULTRA-FINE PEN NEEDLES 29G X 12.7MM MISC  12/29/19   Historical Provider, MD   carvedilol (COREG) 25 MG tablet TAKE 1 TABLET BY MOUTH TWICE A DAY WITH MEALS 10/24/19   Renetta Mistry MD   Blood Pressure KIT BID for record down in log book 3/12/19   Sia Sparks MD   clopidogrel (PLAVIX) 75 MG tablet Take 1 tablet by mouth daily 7/18/17   Joyce Castaneda MD   gabapentin (NEURONTIN) 600 MG tablet Take 600 mg by mouth 3 times daily.      Historical Provider, MD   insulin glargine (LANTUS SOLOSTAR) 100 UNIT/ML injection pen Inject 40 Units into the skin nightly  Patient taking differently: Inject 44 Units into the skin nightly  9/30/16   Hussain Hair MD       Electronically signed by MICKY Wu - CNP on 12/10/2020 at 12:20 AM

## 2020-12-10 NOTE — ED NOTES
Attempted to contact patients daughter [employer] for update per request from patient - no answer and voicemail not set up.       Arrie Bamberger, RN  12/09/20 0732

## 2020-12-10 NOTE — PROGRESS NOTES
Spoke with Dr. Alvy Bamberger on the phone and verified the consult. He stated he would see if someone would be available to see the patient as he was the only one in [place] today.

## 2020-12-10 NOTE — ED NOTES
This nurse cleaned bilateral feet, with NS and surgical soap, as well as chronic wounds on the top of the left great toe, and just under the planter of his second toe. Non adherent pads, ABD pad, Kerlix and an ace wrap was applied to both feet and legs, patient tolerated procedure well.             Layla Jennings RN  12/10/20 2162

## 2020-12-10 NOTE — ED NOTES
Patients oxygen saturation continuously at 100%. Fi02 on BIPAP decreased.   IPAP 14  EPAP 7   FiO2 30%     Dionicio Brumfield RN  12/09/20 3124

## 2020-12-10 NOTE — CARE COORDINATION
CM met with the patient for discharge planning. Patient lives with his brother and grandchildren, has insurance with Rx coverage & PCP, stated that he required assistance with ADL's prior to admission, and has a personal aide that visits 7 days/week for 7 hours/day. Patient stated that he uses a wheelchair at home and does not wear CPAP or home oxygen. Patient stated that he receives services through the [address] [place] [address] and his daughter is his paid personal aide. Patient stated that he lives in [facility] but prefers [facility] because [facility] \"scares me and people treat me nicer here\". The patient plans to return home upon discharge and stated that his brother can provide transportation upon discharge. Patient is unable to identify any other needs at this time, CM available if needs arise.

## 2020-12-10 NOTE — PROGRESS NOTES
Skin assessment done with this nurse and Angelika Murrieta. Dressings in place from ED nurse on BLE. Wounds noted in assessment and in the nurse's ED note. Edema note on BLE and on the scrotum. Toenails thickened and flaky. Call light within reach, no needs expressed at this time.

## 2020-12-10 NOTE — PROGRESS NOTES
The patient's daily home medications were updated in the chart's Home Medication List after being reviewed with:   [] the patient. [x] the patient's facility MAR. [] the patient's family or caregiver.  [] the patient's pharmacy.

## 2020-12-11 ENCOUNTER — APPOINTMENT (OUTPATIENT)
Dept: ULTRASOUND IMAGING | Age: 70
DRG: 291 | End: 2020-12-11
Payer: MEDICARE

## 2020-12-11 LAB
ANION GAP SERPL CALCULATED.3IONS-SCNC: 7 MMOL/L (ref 4–16)
BASOPHILS ABSOLUTE: 0 K/CU MM
BASOPHILS RELATIVE PERCENT: 0.3 % (ref 0–1)
BUN BLDV-MCNC: 24 MG/DL (ref 6–23)
CALCIUM SERPL-MCNC: 7.9 MG/DL (ref 8.3–10.6)
CHLORIDE BLD-SCNC: 104 MMOL/L (ref 99–110)
CHOLESTEROL: 79 MG/DL
CO2: 31 MMOL/L (ref 21–32)
CREAT SERPL-MCNC: 2.2 MG/DL (ref 0.9–1.3)
DIFFERENTIAL TYPE: ABNORMAL
EOSINOPHILS ABSOLUTE: 0.1 K/CU MM
EOSINOPHILS RELATIVE PERCENT: 1 % (ref 0–3)
GFR AFRICAN AMERICAN: 36 ML/MIN/1.73M2
GFR NON-AFRICAN AMERICAN: 30 ML/MIN/1.73M2
GLUCOSE BLD-MCNC: 104 MG/DL (ref 70–99)
GLUCOSE BLD-MCNC: 106 MG/DL (ref 70–99)
GLUCOSE BLD-MCNC: 117 MG/DL (ref 70–99)
GLUCOSE BLD-MCNC: 145 MG/DL (ref 70–99)
GLUCOSE BLD-MCNC: 151 MG/DL (ref 70–99)
GLUCOSE BLD-MCNC: 161 MG/DL (ref 70–99)
GLUCOSE BLD-MCNC: 49 MG/DL (ref 70–99)
GLUCOSE BLD-MCNC: 84 MG/DL (ref 70–99)
GLUCOSE BLD-MCNC: 96 MG/DL (ref 70–99)
HCT VFR BLD CALC: 33.7 % (ref 42–52)
HDLC SERPL-MCNC: 30 MG/DL
HEMOGLOBIN: 9.8 GM/DL (ref 13.5–18)
IMMATURE NEUTROPHIL %: 0.2 % (ref 0–0.43)
LDL CHOLESTEROL DIRECT: 44 MG/DL
LYMPHOCYTES ABSOLUTE: 1 K/CU MM
LYMPHOCYTES RELATIVE PERCENT: 17.3 % (ref 24–44)
MAGNESIUM: 2.4 MG/DL (ref 1.8–2.4)
MCH RBC QN AUTO: 29 PG (ref 27–31)
MCHC RBC AUTO-ENTMCNC: 29.1 % (ref 32–36)
MCV RBC AUTO: 99.7 FL (ref 78–100)
MONOCYTES ABSOLUTE: 0.3 K/CU MM
MONOCYTES RELATIVE PERCENT: 5.6 % (ref 0–4)
PDW BLD-RTO: 14.5 % (ref 11.7–14.9)
PLATELET # BLD: 194 K/CU MM (ref 140–440)
PMV BLD AUTO: 10.4 FL (ref 7.5–11.1)
POTASSIUM SERPL-SCNC: 5 MMOL/L (ref 3.5–5.1)
RBC # BLD: 3.38 M/CU MM (ref 4.6–6.2)
SEGMENTED NEUTROPHILS ABSOLUTE COUNT: 4.4 K/CU MM
SEGMENTED NEUTROPHILS RELATIVE PERCENT: 75.6 % (ref 36–66)
SODIUM BLD-SCNC: 142 MMOL/L (ref 135–145)
TOTAL IMMATURE NEUTOROPHIL: 0.01 K/CU MM
TRIGL SERPL-MCNC: 61 MG/DL
WBC # BLD: 5.9 K/CU MM (ref 4–10.5)

## 2020-12-11 PROCEDURE — 2580000003 HC RX 258: Performed by: NURSE PRACTITIONER

## 2020-12-11 PROCEDURE — 80048 BASIC METABOLIC PNL TOTAL CA: CPT

## 2020-12-11 PROCEDURE — 83735 ASSAY OF MAGNESIUM: CPT

## 2020-12-11 PROCEDURE — 83721 ASSAY OF BLOOD LIPOPROTEIN: CPT

## 2020-12-11 PROCEDURE — 2140000000 HC CCU INTERMEDIATE R&B

## 2020-12-11 PROCEDURE — 85025 COMPLETE CBC W/AUTO DIFF WBC: CPT

## 2020-12-11 PROCEDURE — 93005 ELECTROCARDIOGRAM TRACING: CPT | Performed by: NURSE PRACTITIONER

## 2020-12-11 PROCEDURE — 36415 COLL VENOUS BLD VENIPUNCTURE: CPT

## 2020-12-11 PROCEDURE — 80061 LIPID PANEL: CPT

## 2020-12-11 PROCEDURE — 93010 ELECTROCARDIOGRAM REPORT: CPT | Performed by: INTERNAL MEDICINE

## 2020-12-11 PROCEDURE — 99222 1ST HOSP IP/OBS MODERATE 55: CPT | Performed by: NURSE PRACTITIONER

## 2020-12-11 PROCEDURE — 6360000002 HC RX W HCPCS: Performed by: NURSE PRACTITIONER

## 2020-12-11 PROCEDURE — 6370000000 HC RX 637 (ALT 250 FOR IP): Performed by: NURSE PRACTITIONER

## 2020-12-11 PROCEDURE — 82962 GLUCOSE BLOOD TEST: CPT

## 2020-12-11 PROCEDURE — 2500000003 HC RX 250 WO HCPCS: Performed by: NURSE PRACTITIONER

## 2020-12-11 PROCEDURE — 93925 LOWER EXTREMITY STUDY: CPT

## 2020-12-11 RX ORDER — LORAZEPAM 2 MG/ML
0.5 INJECTION INTRAMUSCULAR EVERY 6 HOURS PRN
Status: DISCONTINUED | OUTPATIENT
Start: 2020-12-11 | End: 2020-12-13 | Stop reason: HOSPADM

## 2020-12-11 RX ORDER — AMLODIPINE BESYLATE 5 MG/1
5 TABLET ORAL DAILY
Status: DISCONTINUED | OUTPATIENT
Start: 2020-12-11 | End: 2020-12-11

## 2020-12-11 RX ORDER — LOSARTAN POTASSIUM 25 MG/1
25 TABLET ORAL DAILY
Status: DISCONTINUED | OUTPATIENT
Start: 2020-12-11 | End: 2020-12-13 | Stop reason: HOSPADM

## 2020-12-11 RX ORDER — ATORVASTATIN CALCIUM 40 MG/1
40 TABLET, FILM COATED ORAL NIGHTLY
Status: DISCONTINUED | OUTPATIENT
Start: 2020-12-11 | End: 2020-12-13 | Stop reason: HOSPADM

## 2020-12-11 RX ORDER — BUMETANIDE 0.25 MG/ML
1 INJECTION, SOLUTION INTRAMUSCULAR; INTRAVENOUS 2 TIMES DAILY
Status: DISCONTINUED | OUTPATIENT
Start: 2020-12-11 | End: 2020-12-13

## 2020-12-11 RX ORDER — METOLAZONE 2.5 MG/1
2.5 TABLET ORAL 2 TIMES DAILY
Status: DISCONTINUED | OUTPATIENT
Start: 2020-12-11 | End: 2020-12-11

## 2020-12-11 RX ADMIN — ONDANSETRON 4 MG: 2 INJECTION INTRAMUSCULAR; INTRAVENOUS at 02:03

## 2020-12-11 RX ADMIN — CARVEDILOL 3.12 MG: 3.12 TABLET, FILM COATED ORAL at 10:24

## 2020-12-11 RX ADMIN — LORAZEPAM 0.5 MG: 2 INJECTION INTRAMUSCULAR; INTRAVENOUS at 02:02

## 2020-12-11 RX ADMIN — DICYCLOMINE HYDROCHLORIDE 10 MG: 10 CAPSULE ORAL at 08:50

## 2020-12-11 RX ADMIN — CLOPIDOGREL BISULFATE 75 MG: 75 TABLET ORAL at 08:43

## 2020-12-11 RX ADMIN — BUMETANIDE 1 MG: 0.25 INJECTION INTRAMUSCULAR; INTRAVENOUS at 17:07

## 2020-12-11 RX ADMIN — BUMETANIDE 1 MG: 0.25 INJECTION INTRAMUSCULAR; INTRAVENOUS at 08:43

## 2020-12-11 RX ADMIN — ENOXAPARIN SODIUM 40 MG: 40 INJECTION SUBCUTANEOUS at 08:43

## 2020-12-11 RX ADMIN — DICYCLOMINE HYDROCHLORIDE 10 MG: 10 CAPSULE ORAL at 11:08

## 2020-12-11 RX ADMIN — ATORVASTATIN CALCIUM 40 MG: 40 TABLET, FILM COATED ORAL at 20:25

## 2020-12-11 RX ADMIN — DOXAZOSIN 2 MG: 1 TABLET ORAL at 08:43

## 2020-12-11 RX ADMIN — SODIUM CHLORIDE, PRESERVATIVE FREE 10 ML: 5 INJECTION INTRAVENOUS at 08:50

## 2020-12-11 RX ADMIN — CARVEDILOL 3.12 MG: 3.12 TABLET, FILM COATED ORAL at 20:25

## 2020-12-11 RX ADMIN — DICYCLOMINE HYDROCHLORIDE 10 MG: 10 CAPSULE ORAL at 20:25

## 2020-12-11 RX ADMIN — SODIUM CHLORIDE, PRESERVATIVE FREE 10 ML: 5 INJECTION INTRAVENOUS at 20:25

## 2020-12-11 RX ADMIN — DEXTROSE MONOHYDRATE 12.5 G: 25 INJECTION, SOLUTION INTRAVENOUS at 02:35

## 2020-12-11 RX ADMIN — GABAPENTIN 600 MG: 600 TABLET, FILM COATED ORAL at 08:43

## 2020-12-11 RX ADMIN — GABAPENTIN 600 MG: 600 TABLET, FILM COATED ORAL at 20:25

## 2020-12-11 RX ADMIN — LOSARTAN POTASSIUM 25 MG: 25 TABLET ORAL at 10:24

## 2020-12-11 RX ADMIN — DEXTROSE MONOHYDRATE 12.5 G: 25 INJECTION, SOLUTION INTRAVENOUS at 02:00

## 2020-12-11 RX ADMIN — SODIUM CHLORIDE, PRESERVATIVE FREE 10 ML: 5 INJECTION INTRAVENOUS at 17:08

## 2020-12-11 RX ADMIN — GABAPENTIN 600 MG: 600 TABLET, FILM COATED ORAL at 14:04

## 2020-12-11 RX ADMIN — DICYCLOMINE HYDROCHLORIDE 10 MG: 10 CAPSULE ORAL at 17:07

## 2020-12-11 RX ADMIN — METOLAZONE 2.5 MG: 2.5 TABLET ORAL at 08:43

## 2020-12-11 RX ADMIN — FAMOTIDINE 20 MG: 20 TABLET, FILM COATED ORAL at 08:43

## 2020-12-11 ASSESSMENT — PAIN SCALES - GENERAL
PAINLEVEL_OUTOF10: 0

## 2020-12-11 NOTE — PLAN OF CARE
Problem: Falls - Risk of:  Goal: Will remain free from falls  Description: Will remain free from falls  12/10/2020 2305 by Michelle Francisco RN  Outcome: Ongoing  12/10/2020 1102 by Ari Torres RN  Outcome: Ongoing  Goal: Absence of physical injury  Description: Absence of physical injury  12/10/2020 2305 by Michelle Francisco RN  Outcome: Ongoing  12/10/2020 1102 by Ari Torres RN  Outcome: Ongoing     Problem: Skin Integrity:  Goal: Will show no infection signs and symptoms  Description: Will show no infection signs and symptoms  12/10/2020 2305 by Michelle Francisco RN  Outcome: Ongoing  12/10/2020 1102 by Ari Torres RN  Outcome: Ongoing  Goal: Absence of new skin breakdown  Description: Absence of new skin breakdown  12/10/2020 2305 by Michelle Francisco RN  Outcome: Ongoing  12/10/2020 1102 by Ari Torres RN  Outcome: Ongoing

## 2020-12-11 NOTE — PROGRESS NOTES
Patient's blood glucose is 278 at bedtime and at 0200 re-check it is 49. Patien given 25ml of 50% Dextrose IV and and given sulma crackers and peanut butter. Oxygen per nasal cannula @ 6 liters nasal cannula currently. Will monitor and re-check per Protocol.    Jagruti Mcmillan  2:29 AM

## 2020-12-11 NOTE — PROGRESS NOTES
Comprehensive Nutrition Assessment    Type and Reason for Visit:  Initial, Wound, Positive Nutrition Screen    Nutrition Recommendations/Plan: Diet modified to add carb controlled to low sodium, FR diet. Nutrition Assessment:  pt reports just not following his low sodium diet nor carb controlled diet at home. He was able to verbalize foods higher in sodium and carbs. Weight gani of 35# from his UBW. No chewing problems despite several missing teeth. Chronic skin issues and is followed by the wound clinic for arterial and diabetic wounds. Discussed glucose control and how it relates to wound healing. Malnutrition Assessment:  Malnutrition Status:   At risk for malnutrition (Comment)    Context:  Chronic Illness     Findings of the 6 clinical characteristics of malnutrition:      Estimated Daily Nutrient Needs:  Energy (kcal):  2200; Weight Used for Energy Requirements:  Current     Protein (g):  110-138; Weight Used for Protein Requirements:  Current(.8-1.0)        Fluid (ml/day):  2200; Method Used for Fluid Requirements:  1 ml/kcal      Nutrition Related Findings:  A1c 6.6,      Wounds:  Multiple, Diabetic Ulcer, Wound Consult Pending       Current Nutrition Therapies:    DIET LOW SODIUM 2 GM; Carb Control: 4 carb choices (60 gms)/meal; 1200 ml    Anthropometric Measures:  · Height: 6' 1\" (185.4 cm)  · Current Body Weight: 305 lb (138.3 kg)   · Admission Body Weight: 305 lb (138.3 kg)    · Usual Body Weight: 270 lb (122.5 kg)(reported)     · Ideal Body Weight: 184 lbs; % Ideal Body Weight 165.8 %   · BMI: 40.2  · BMI Categories: Obese Class 3 (BMI 40.0 or greater)       Nutrition Diagnosis:   · In context of chronic illness, Overweight/Obese, No nutrition diagnosis at this time related to excessive energy intake as evidenced by        Nutrition Interventions:   Food and/or Nutrient Delivery:  Modify Current Diet(added carb controlled)  Nutrition Education/Counseling:  Education initiated

## 2020-12-11 NOTE — CONSULTS
CARDIOLOGY CONSULT NOTE     Reason for consultation:  Cardiac stents/ chronically elevated stents     Referring physician:  Alex Montez MD     Primary care physician: Jef Cerda      Dear  Dr. Alex Montez MD   Thanks for the consult. Chief Complaints :  Chief Complaint   Patient presents with    Leg Swelling     C/o bilateral leg swelling for the past 3 days. Patient states his testicles are starting to swell now and minimal shortness of breath. Patient has CKD and wound on the left foot (Dr. Wendy Carpenter)        History of present illness:Anmol is a 79 y. o.year old with a history of CAD, MI, PAD CKD, DM and HTN who presents with shortness of breath and leg swelling. He is a poor historian. He reports he has been short of breath for a week. He also reports lower leg edema for a three weeks. He admits to not following his diet at home and states \" I ate to much for Thanksgiving\". Past medical history:    has a past medical history of Acid reflux, Acute MI ([address] Arthritis, Broken teeth, CAD (coronary artery disease), Cardiomyopathy ([address] CHF (congestive heart failure) ([address] Chronic back pain, Chronic kidney disease, Diabetes mellitus ([address] Diabetic neuropathy ([address] H/O cardiovascular stress test, H/O Doppler ultrasound, H/O percutaneous left heart catheterization, History of irregular heartbeat, History of syncope, Hyperlipidemia, Hypertension, Leg swelling, Necrotic toes (HCC), Neuropathy, neuropathy, PAD (peripheral artery disease) ([address] PVD (peripheral vascular disease) ([address] Sick sinus syndrome ([address] Sleep apnea, Spinal stenosis, Teeth missing, and Type 2 diabetes mellitus without complication ([address] Past surgical history:   has a past surgical history that includes Coronary angioplasty with stent; Dental surgery; Colonoscopy (08/04/2016); pacemaker placement (06/04/2010); vascular surgery; colectomy (Right, 08/26/2016); Toe amputation (Right, 09/12/2017);  Toe amputation (Right, Nightly  furosemide (LASIX) injection 40 mg, BID  nitroGLYCERIN (NITROSTAT) SL tablet 0.4 mg, Q5 Min PRN  influenza quadrivalent split vaccine (FLUZONE;FLUARIX;FLULAVAL;AFLURIA) injection 0.5 mL, Prior to discharge  metOLazone (ZAROXOLYN) tablet 5 mg, BID      Current Facility-Administered Medications   Medication Dose Route Frequency Provider Last Rate Last Dose    LORazepam (ATIVAN) injection 0.5 mg  0.5 mg Intravenous Q6H PRN Iglesias Pavy, APRN - CNP   0.5 mg at 12/11/20 0202    albuterol sulfate  (90 Base) MCG/ACT inhaler 2 puff  2 puff Inhalation Q4H PRN Iglesias Pavy, APRN - CNP        amLODIPine (NORVASC) tablet 10 mg  10 mg Oral Daily Iglesias Pavy, APRN - CNP   10 mg at 12/10/20 2385    Algicell Calcium Dressing 4\"x8 MISC 1 Device  1 Device Apply externally Daily Iglesias Pavy, APRN - CNP        carvedilol (COREG) tablet 3.125 mg  3.125 mg Oral BID Iglesias Pavy, APRN - CNP   3.125 mg at 12/10/20 2015    clopidogrel (PLAVIX) tablet 75 mg  75 mg Oral Daily Iglesias Pavy, APRN - CNP   75 mg at 12/10/20 4877    dicyclomine (BENTYL) capsule 10 mg  10 mg Oral 4x Daily AC & HS Iglesias Pavy, APRN - CNP   10 mg at 12/10/20 2015    doxazosin (CARDURA) tablet 2 mg  2 mg Oral Daily Iglesias Pavy, APRN - CNP   2 mg at 12/10/20 1006    gabapentin (NEURONTIN) tablet 600 mg  600 mg Oral TID Iglesias Pavy, APRN - CNP   600 mg at 12/10/20 2015    insulin glargine (LANTUS;BASAGLAR) injection pen 44 Units  44 Units Subcutaneous Nightly Iglesias Pavy, APRN - CNP   44 Units at 12/10/20 2015    ondansetron (ZOFRAN-ODT) disintegrating tablet 4 mg  4 mg Oral Q8H PRN Iglesias Pavy, APRN - CNP        Dulaglutide SOPN 0.75 mg  0.75 mg Subcutaneous Weekly Iglesias Pavy, APRN - CNP        sodium chloride flush 0.9 % injection 10 mL  10 mL Intravenous 2 times per day Iglesias Pavy, APRN - CNP   10 mL at 12/10/20 2017    sodium chloride flush 0.9 % injection 10 mL  10 mL Intravenous PRN Iglesias Pavy, APRN - CNP   10 mL at 12/10/20 1756    acetaminophen (TYLENOL) tablet 650 mg  650 mg Oral Q6H PRN Leidy Deborah, APRN - CNP        Or    acetaminophen (TYLENOL) suppository 650 mg  650 mg Rectal Q6H PRN Leidy Deborah, APRN - CNP        polyethylene glycol (GLYCOLAX) packet 17 g  17 g Oral Daily PRN Leidy Deborah, APRN - CNP        promethazine (PHENERGAN) tablet 12.5 mg  12.5 mg Oral Q6H PRN Leidy Deborah, APRN - CNP        Or    ondansetron [facility]) injection 4 mg  4 mg Intravenous Q6H PRN Leidy Deborah, APRN - CNP   4 mg at 12/11/20 0203    enoxaparin (LOVENOX) injection 40 mg  40 mg Subcutaneous Daily Leidy Deborah, APRN - CNP   40 mg at 12/10/20 0836    glucose (GLUTOSE) 40 % oral gel 15 g  15 g Oral PRN Leidy Deborah, APRN - CNP        dextrose 50 % IV solution  12.5 g Intravenous PRN Leidy Deborah, APRN - CNP   12.5 g at 12/11/20 0235    glucagon (rDNA) injection 1 mg  1 mg Intramuscular PRN Leidy Deborah, APRN - CNP        dextrose 5 % solution  100 mL/hr Intravenous PRN Leidy Deborah, APRN - CNP        famotidine (PEPCID) tablet 20 mg  20 mg Oral Daily Leidy Deborah, APRN - CNP   20 mg at 12/10/20 0837    insulin lispro (HUMALOG) injection vial 0-18 Units  0-18 Units Subcutaneous TID WC Leidy Deborah, APRN - CNP        insulin lispro (HUMALOG) injection vial 0-9 Units  0-9 Units Subcutaneous Nightly Leidy Deborah, APRN - CNP   5 Units at 12/10/20 2016    furosemide (LASIX) injection 40 mg  40 mg Intravenous BID Leidy Deborah, APRN - CNP   40 mg at 12/10/20 1756    nitroGLYCERIN (NITROSTAT) SL tablet 0.4 mg  0.4 mg Sublingual Q5 Min PRN Leidy Deborah, APRN - CNP        influenza quadrivalent split vaccine (FLUZONE;FLUARIX;FLULAVAL;AFLURIA) injection 0.5 mL  0.5 mL Intramuscular Prior to discharge Jake Huynh MD        metOLazone (ZAROXOLYN) tablet 5 mg  5 mg Oral BID Jake Huynh MD   5 mg at 12/10/20 2260     Review of Systems:   · Constitutional: No Fever or Weight + gain  · Eyes: No Decreased Vision  · ENT: No Headaches, Hearing Loss or Vertigo  · Cardiovascular: As per HPI  · Respiratory: short of breath with activity  · Gastrointestinal: No abdominal pain, appetite loss, blood in stools, constipation, diarrhea or heartburn  · Genitourinary: No dysuria, trouble voiding, or hematuria  · Musculoskeletal: + gait disturbance, + weakness or joint complaints  · Integumentary: blisters to legs  Left  foot wound  · Neurological: No TIA or stroke symptoms  · Psychiatric: No anxiety or depression  · Endocrine: No malaise,+  fatigue   · Hematologic/Lymphatic: No bleeding problems, blood clots or swollen lymph nodes  · Allergic/Immunologic: No nasal congestion or hives  All systems negative except as marked. Physical Examination:    Vitals:    12/11/20 0335 12/11/20 0400 12/11/20 0500 12/11/20 0600   BP: 112/69      Pulse: 60 60 60 60   Resp: 12 12 13 15   Temp:       TempSrc:       SpO2: (!) 89% 92% 95% 93%   Weight:       Height:           General Appearance:  No distress,   Constitutional:  Well developed, obese No acute distress- on bipap  HENT:  Normocephalic, Atraumatic, Bilateral external ears normal, Oropharynx moist, No oral exudates, Nose normal.   Neck- trachea is midline    Lymphatics: no palpable lymph nodes  Eyes:  PERRL, Conjunctiva normal, No discharge.    Respiratory:  decreased breath sounds, No respiratory distress  Cardiovascular: (PMI):  RRR, S1 and S2 audible, npo murmur appreciated, JVD not noted,  apex non displaced, no lifts, no thrills, edema to lower legs Present  Abdomen /GI: Soft, No tenderness, No masses, No gross visceromegaly   : round non tender   Musculoskeletal:  +2-3  Edema to lower legs, + tenderness,   Integument: left lower leg with dressings blisters to lower legs,  dry    Lymphatic:  No lymphadenopathy noted   Neurologic:  Alert & oriented x 3, CN 2-12 grossly normal      Medical decision making and Data review:    Lab Review   Recent Labs     12/09/20  2100   WBC 7.2   HGB 11.1* HCT 36.9*         Recent Labs     12/10/20  0530      K 4.6      CO2 32   BUN 23   CREATININE 1.9*     Recent Labs     12/09/20  2100   AST 13*   ALT 7*   BILIDIR 0.3   BILITOT 0.9   ALKPHOS 75     Recent Labs     12/10/20  0221 12/10/20  0530 12/10/20  1930   TROPONINT 0.032* 0.037* 0.054*       Recent Labs     12/09/20  2100   PROBNP 3,168*     Lab Results   Component Value Date    INR 1.03 11/19/2018    PROTIME 11.7 11/19/2018       EKG:ordered     ECHO:08/13/2020  Summary   Left ventricular systolic function is abnormal.   Ejection fraction is visually estimated at 40 to 45%. Grade I diastolic dysfunction. Severe left ventricular hypertrophy. ICD wiring visualized within the right heart. No evidence of any pericardial effusion. Mildly dilated root (4.1 cm). NM stress test 08/13/2020  Summary     Abnormal Study.     IMI, a medium territory. No Ischemia.     Borderline reduction of LV function. LVEF is 48 %.       Recommendation     Aggressive medical therapy for coronary artery disease       Chest Xray:  Xr Foot Left (min 3 Views)    Result Date: 12/9/2020  EXAMINATION: THREE XRAY VIEWS OF THE LEFT FOOT 12/9/2020 9:07 pm COMPARISON: None. HISTORY: ORDERING SYSTEM PROVIDED HISTORY: left toe pain; please evaluate for signs of osteomyelitis in first toe TECHNOLOGIST PROVIDED HISTORY: Reason for exam:->left toe pain; please evaluate for signs of osteomyelitis in first toe Reason for Exam: left toe pain; please evaluate for signs of osteomyelitis in first toe Acuity: Acute Type of Exam: Initial FINDINGS: There is diffuse osseous demineralization. There may be a fracture at the distal tip of the 1st distal phalanx. There is diffuse soft tissue swelling. Is diffuse degenerative joint disease. Nondisplaced fracture of the distal 1st phalanx. Diffuse soft tissue swelling.      Xr Chest Portable    Result Date: 12/9/2020  EXAMINATION: ONE XRAY VIEW OF THE CHEST 12/9/2020 9:07 pm COMPARISON: Chest radiograph performed 10/28/2020. HISTORY: ORDERING SYSTEM PROVIDED HISTORY: CHF TECHNOLOGIST PROVIDED HISTORY: Reason for exam:->CHF Reason for Exam: left toe pain; please evaluate for signs of osteomyelitis in first toe Acuity: Acute Type of Exam: Initial FINDINGS: The lungs are without consolidation or effusion. There is no pneumothorax. The heart is enlarged with stable cardiac leads. The upper abdomen is unremarkable. The extrathoracic soft tissues are unremarkable. Cardiomegaly without acute pulmonary process. All labs, medications and tests reviewed by myself including data  from outside source , patient and available family . Continue all other medications of all above medical condition listed as is. Impression:  Active Problems:    Acute on chronic heart failure with reduced ejection fraction and diastolic dysfunction (HCC)    Heart failure exacerbated by sotalol (HCC)  Resolved Problems:    * No resolved hospital problems. *      Assessment: 79 y. o.year old with PMH of  has a past medical history of Acid reflux, Acute MI ([address] Arthritis, Broken teeth, CAD (coronary artery disease), Cardiomyopathy ([address] CHF (congestive heart failure) ([address] Chronic back pain, Chronic kidney disease, Diabetes mellitus ([address] Diabetic neuropathy ([address] H/O cardiovascular stress test, H/O Doppler ultrasound, H/O percutaneous left heart catheterization, History of irregular heartbeat, History of syncope, Hyperlipidemia, Hypertension, Leg swelling, Necrotic toes (HCC), Neuropathy, neuropathy, PAD (peripheral artery disease) ([address] PVD (peripheral vascular disease) ([address] Sick sinus syndrome ([address] Sleep apnea, Spinal stenosis, Teeth missing, and Type 2 diabetes mellitus without complication ([address] Plan and Recommendations:    1. Acute decompensated congestive heart failure- Ef 40-45% with garde I DD severe LVH-  elevated BNP, edema, shortness of breath, weight gain.  recommend to continue with IV diuresis - change lasix to bumex-Add ARB - continue with coreg- strict I/O -daily weights,  fluid restriction- refer to CHF clinic as outpatient   2. Cardiomyopathy- has ICD in place- elevated Optival suggest decompensated heart failure- continue with diuresis   3. Elevated troponin-chronic elevation- in the setting of acute infection, renal dysfunction most likely a type 2 leak  Recent NM ( 2020) stress test reviewed no ischemia: IMI   EF 48 % -continue with ASA, plavix and coreg   4. Known CAD- h/o MI / PCI- cath 2018- patent stents -continue ASA- BB- add statin   5. PVD- h/o PTA of SFA-toe amputation- now with wound to left lower leg/ foot check US of lower extremities- if surgical intervention needed recommend transfer to [facility]   6. HTN- controlled- continue with amlodipine  -- will  amlodipine and add low dose losartan    7. CKD- per primary      Case discussed with Dr Donnie Becerra         Thank you  much for consult and giving us the opportunity in contributing in the care of this patient. Please feel free to call me for any questions.        Anjana Thomson, APRN - CNP, 2020 7:18 AM

## 2020-12-12 LAB
ANION GAP SERPL CALCULATED.3IONS-SCNC: 6 MMOL/L (ref 4–16)
BUN BLDV-MCNC: 28 MG/DL (ref 6–23)
CALCIUM SERPL-MCNC: 7.9 MG/DL (ref 8.3–10.6)
CHLORIDE BLD-SCNC: 103 MMOL/L (ref 99–110)
CO2: 34 MMOL/L (ref 21–32)
CREAT SERPL-MCNC: 2.3 MG/DL (ref 0.9–1.3)
GFR AFRICAN AMERICAN: 34 ML/MIN/1.73M2
GFR NON-AFRICAN AMERICAN: 28 ML/MIN/1.73M2
GLUCOSE BLD-MCNC: 114 MG/DL (ref 70–99)
GLUCOSE BLD-MCNC: 115 MG/DL (ref 70–99)
GLUCOSE BLD-MCNC: 139 MG/DL (ref 70–99)
GLUCOSE BLD-MCNC: 52 MG/DL (ref 70–99)
GLUCOSE BLD-MCNC: 71 MG/DL (ref 70–99)
GLUCOSE BLD-MCNC: 72 MG/DL (ref 70–99)
GLUCOSE BLD-MCNC: 82 MG/DL (ref 70–99)
MAGNESIUM: 2.2 MG/DL (ref 1.8–2.4)
POTASSIUM SERPL-SCNC: 4.8 MMOL/L (ref 3.5–5.1)
SODIUM BLD-SCNC: 143 MMOL/L (ref 135–145)

## 2020-12-12 PROCEDURE — 6370000000 HC RX 637 (ALT 250 FOR IP): Performed by: NURSE PRACTITIONER

## 2020-12-12 PROCEDURE — 94660 CPAP INITIATION&MGMT: CPT

## 2020-12-12 PROCEDURE — 2580000003 HC RX 258: Performed by: NURSE PRACTITIONER

## 2020-12-12 PROCEDURE — 82962 GLUCOSE BLOOD TEST: CPT

## 2020-12-12 PROCEDURE — 80048 BASIC METABOLIC PNL TOTAL CA: CPT

## 2020-12-12 PROCEDURE — 6360000002 HC RX W HCPCS: Performed by: NURSE PRACTITIONER

## 2020-12-12 PROCEDURE — 2500000003 HC RX 250 WO HCPCS: Performed by: NURSE PRACTITIONER

## 2020-12-12 PROCEDURE — 83735 ASSAY OF MAGNESIUM: CPT

## 2020-12-12 PROCEDURE — 2140000000 HC CCU INTERMEDIATE R&B

## 2020-12-12 PROCEDURE — 36415 COLL VENOUS BLD VENIPUNCTURE: CPT

## 2020-12-12 PROCEDURE — 6370000000 HC RX 637 (ALT 250 FOR IP): Performed by: INTERNAL MEDICINE

## 2020-12-12 PROCEDURE — 94761 N-INVAS EAR/PLS OXIMETRY MLT: CPT

## 2020-12-12 PROCEDURE — 2700000000 HC OXYGEN THERAPY PER DAY

## 2020-12-12 RX ORDER — CALCIUM CARBONATE 200(500)MG
1000 TABLET,CHEWABLE ORAL ONCE
Status: COMPLETED | OUTPATIENT
Start: 2020-12-12 | End: 2020-12-12

## 2020-12-12 RX ORDER — TRAMADOL HYDROCHLORIDE 50 MG/1
50 TABLET ORAL ONCE
Status: COMPLETED | OUTPATIENT
Start: 2020-12-12 | End: 2020-12-12

## 2020-12-12 RX ADMIN — SODIUM CHLORIDE, PRESERVATIVE FREE 10 ML: 5 INJECTION INTRAVENOUS at 08:00

## 2020-12-12 RX ADMIN — DICYCLOMINE HYDROCHLORIDE 10 MG: 10 CAPSULE ORAL at 17:11

## 2020-12-12 RX ADMIN — GABAPENTIN 600 MG: 600 TABLET, FILM COATED ORAL at 07:59

## 2020-12-12 RX ADMIN — ATORVASTATIN CALCIUM 40 MG: 40 TABLET, FILM COATED ORAL at 20:32

## 2020-12-12 RX ADMIN — BUMETANIDE 1 MG: 0.25 INJECTION INTRAMUSCULAR; INTRAVENOUS at 07:59

## 2020-12-12 RX ADMIN — DICYCLOMINE HYDROCHLORIDE 10 MG: 10 CAPSULE ORAL at 05:54

## 2020-12-12 RX ADMIN — SODIUM CHLORIDE, PRESERVATIVE FREE 10 ML: 5 INJECTION INTRAVENOUS at 20:32

## 2020-12-12 RX ADMIN — DICYCLOMINE HYDROCHLORIDE 10 MG: 10 CAPSULE ORAL at 12:19

## 2020-12-12 RX ADMIN — GABAPENTIN 600 MG: 600 TABLET, FILM COATED ORAL at 12:19

## 2020-12-12 RX ADMIN — CALCIUM CARBONATE 1000 MG: 500 TABLET, CHEWABLE ORAL at 12:17

## 2020-12-12 RX ADMIN — BUMETANIDE 1 MG: 0.25 INJECTION INTRAMUSCULAR; INTRAVENOUS at 17:11

## 2020-12-12 RX ADMIN — CARVEDILOL 3.12 MG: 3.12 TABLET, FILM COATED ORAL at 07:59

## 2020-12-12 RX ADMIN — LOSARTAN POTASSIUM 25 MG: 25 TABLET ORAL at 07:59

## 2020-12-12 RX ADMIN — FAMOTIDINE 20 MG: 20 TABLET, FILM COATED ORAL at 07:59

## 2020-12-12 RX ADMIN — GABAPENTIN 600 MG: 600 TABLET, FILM COATED ORAL at 20:32

## 2020-12-12 RX ADMIN — CLOPIDOGREL BISULFATE 75 MG: 75 TABLET ORAL at 07:58

## 2020-12-12 RX ADMIN — DICYCLOMINE HYDROCHLORIDE 10 MG: 10 CAPSULE ORAL at 20:32

## 2020-12-12 RX ADMIN — CARVEDILOL 3.12 MG: 3.12 TABLET, FILM COATED ORAL at 20:32

## 2020-12-12 RX ADMIN — DOXAZOSIN 2 MG: 1 TABLET ORAL at 07:58

## 2020-12-12 RX ADMIN — TRAMADOL HYDROCHLORIDE 50 MG: 50 TABLET, FILM COATED ORAL at 17:11

## 2020-12-12 RX ADMIN — ENOXAPARIN SODIUM 40 MG: 40 INJECTION SUBCUTANEOUS at 07:59

## 2020-12-12 ASSESSMENT — PAIN DESCRIPTION - DESCRIPTORS: DESCRIPTORS: ACHING;SHARP

## 2020-12-12 ASSESSMENT — PAIN DESCRIPTION - ORIENTATION: ORIENTATION: LEFT

## 2020-12-12 ASSESSMENT — PAIN SCALES - GENERAL
PAINLEVEL_OUTOF10: 9
PAINLEVEL_OUTOF10: 0

## 2020-12-12 ASSESSMENT — PAIN DESCRIPTION - ONSET: ONSET: ON-GOING

## 2020-12-12 ASSESSMENT — PAIN DESCRIPTION - FREQUENCY: FREQUENCY: CONTINUOUS

## 2020-12-12 ASSESSMENT — PAIN DESCRIPTION - LOCATION: LOCATION: LEG;FOOT

## 2020-12-12 ASSESSMENT — PAIN DESCRIPTION - PAIN TYPE: TYPE: CHRONIC PAIN

## 2020-12-12 NOTE — PROGRESS NOTES
Patient removed bipap mask and adamantly refuses to wear. Patient placed back on nasal cannula at 3lpm with Sp02 94%.

## 2020-12-12 NOTE — PROGRESS NOTES
Noticed per morning labs that BS was 51. Patient had drank orange juice x2 since blood was drawn, finger stick done and BS is 82 at present.

## 2020-12-12 NOTE — PROGRESS NOTES
Hospitalist Progress Note      Name:  Samara Harper /Age/Sex: 1950  (79 y.o. male)   MRN & CSN:  [id number] & [id number] Admission Date/Time: 2020  7:58 PM   Location:  001- PCP: Sunny Barraza Day: 4    Assessment and Plan:     1. Acute exacerbation of CHFrEF: 2700 mL output overnight with a -2040 mL balance with additional -980 this shift. Patient improved work of breathing less anxious. Lower extremity edema improved as well as scrotal swelling. EF to 40 to 45% grade 1 DD severe LVH. We will continue Bumex 1 mg twice daily IV. Salt and fluid restriction strict FELIX's Daily weights. Discontinue metolazone in the setting of Bumex. Discontinued calcium channel blocker. Patient had bump in creatinine 2.3 and BUN 28 showing contraction. Hypocalcemia 7.9 added oral calcium carbonate 1 g today. Clinically improved continue to monitor. 2.  Hypertension: 136/61. Controlled for age and prior to medications. New dose of losartan 25 mg daily yesterday discontinued amlodipine. Continue to monitor    3. Insulin-dependent diabetes: Patient had low serum glucose this a.m. early morning 52 recheck 82. I discontinued dulaglutide and long-acting insulin given at nighttime. Will use low-dose sliding scale cautiously. Continue to monitor. 4.  Chronic elevated troponin: In setting of CHF, CAD and infection. No chest pain throughout hospital stay. Type II injury noted. Recent stress test with no evidence of ischemia. Continue Coreg aspirin and Plavix    5. CAD: PCI 2018 patent stents, continue GDMT    6. STACI: Patient noncompliant with BiPAP overnight. Appropriate O2 sats on 2 L supplemental at 99% will attempt to wean from same during the day as the patient is more alert less tired. 7.  CKD: Creatinine 2.3 from 2.2 could be lab error. However, diuresing in the setting of CHF with some contraction alkalosis noted.   Have held metolazone in the setting of Bumex.  Will monitor overnight consider adding gentle hydration to minimize kidney injury. Most likely combination of diabetic nephropathy and hypertension    8. Lower extremity wounds: Improved continue wound care    9. Morbid obesity 40: However, patient has lost approximately 5 pounds BMI now 39.78      Diet DIET LOW SODIUM 2 GM; Carb Control: 4 carb choices (60 gms)/meal; 1200 ml   DVT Prophylaxis [] Lovenox, []  Heparin, [] SCDs, []No VTE prophylaxis, patient ambulating   GI Prophylaxis [] PPI, [] H2 Blocker, [] No GI prophylaxis, patient is receiving diet/Tube Feeds   Code Status Full Code   Disposition Patient requires continued admission due to continue diuresing wound care optimizing insulin regimen   MDM [] Low, [x] Moderate,[x]  High  Patient's risk as above due to as above     History of Present Illness:     Pt S&E. No acute events overnight. Patient resting comfortably sitting upright in chair. Denies any headache blurry vision dizziness. Denies any chest pain palpitations. Shortness of breath markedly improved. Patient continues with good urinary output. Complain of some constipation today ordered stool softener for same. Denies abdominal pain dysuria or pain with defecation. Denies any generalized myalgias or muscle or joint pain. Scrotal swelling improved    10-14 point ROS reviewed negative, unless as noted above    Objective: Intake/Output Summary (Last 24 hours) at 12/12/2020 1148  Last data filed at 12/12/2020 1104  Gross per 24 hour   Intake 590 ml   Output 3050 ml   Net -2460 ml      Vitals:   Vitals:    12/12/20 0812   BP:    Pulse:    Resp:    Temp:    SpO2: 94%     Physical Exam:    GEN    Awake morbidly obese -American male, sitting upright in bed in no apparent distress. Appears given age. EYES   Pupils are equally round. No scleral erythema, discharge, or conjunctivitis. HENT  Mucous membranes are moist.   NECK  No apparent thyromegaly or masses.   RESP  Clear to auscultation upper lung fields improved right base crackles left base improved as well, no wheezes, rales or rhonchi. Symmetric chest movement while on 2 L supplemental.  CARDIO/VASC           S1/S2 auscultated. Regular rate without appreciable murmurs, rubs, or gallops. Peripheral pulses equal bilaterally and palpable. Improved peripheral edema but persistent 1+2 lower extremities. GI        Abdomen protuberant is soft without significant tenderness, masses, or guarding. Bowel sounds are normoactive. Rectal exam deferred.        Fuller catheter is not present. Improved scrotal swelling persistent  HEME/LYMPH            No petechiae or ecchymoses. MSK    No gross joint deformities except amputated toes right foot. Spontaneous movement of all extremities  SKIN    Normal coloration, warm, dry. With the exception of lower extremities wounds cleaned wrapped without excessive drainage  NEURO           Cranial nerves appear grossly intact, normal speech, no lateralizing weakness. PSYCH            Awake, alert, oriented x 4. Affect appropriate.     Medications:   Medications:    calcium carbonate  1,000 mg Oral Once    atorvastatin  40 mg Oral Nightly    bumetanide  1 mg Intravenous BID    losartan  25 mg Oral Daily    [Held by provider] insulin glargine  20 Units Subcutaneous Nightly    insulin lispro  0-6 Units Subcutaneous TID WC    insulin lispro  0-3 Units Subcutaneous Nightly    Algicell Calcium Dressing 4\"x8  1 Device Apply externally Daily    carvedilol  3.125 mg Oral BID    clopidogrel  75 mg Oral Daily    dicyclomine  10 mg Oral 4x Daily AC & HS    doxazosin  2 mg Oral Daily    gabapentin  600 mg Oral TID    [Held by provider] Dulaglutide  0.75 mg Subcutaneous Weekly    sodium chloride flush  10 mL Intravenous 2 times per day    enoxaparin  40 mg Subcutaneous Daily    famotidine  20 mg Oral Daily    influenza virus vaccine  0.5 mL Intramuscular Prior to discharge      Infusions:    dextrose       PRN Meds:     LORazepam, 0.5 mg, Q6H PRN      albuterol sulfate HFA, 2 puff, Q4H PRN      ondansetron, 4 mg, Q8H PRN      sodium chloride flush, 10 mL, PRN      acetaminophen, 650 mg, Q6H PRN    Or      acetaminophen, 650 mg, Q6H PRN      polyethylene glycol, 17 g, Daily PRN      promethazine, 12.5 mg, Q6H PRN    Or      ondansetron, 4 mg, Q6H PRN      glucose, 15 g, PRN      dextrose, 12.5 g, PRN      glucagon (rDNA), 1 mg, PRN      dextrose, 100 mL/hr, PRN      nitroGLYCERIN, 0.4 mg, Q5 Min PRN          Electronically signed by Shawn Mack MD on 12/12/2020 at 11:48 AM

## 2020-12-12 NOTE — PLAN OF CARE
Problem: Falls - Risk of:  Goal: Will remain free from falls  Description: Will remain free from falls  12/12/2020 0754 by Moy Montero RN  Outcome: Ongoing  12/11/2020 2001 by Marcos Morocho RN  Outcome: Ongoing  12/11/2020 2000 by Marcos Morocho RN  Outcome: Ongoing  12/11/2020 1828 by Camila Rain RN  Outcome: Ongoing  Goal: Absence of physical injury  Description: Absence of physical injury  12/12/2020 0754 by Moy Montero RN  Outcome: Ongoing  12/11/2020 2001 by Marcos Morocho RN  Outcome: Ongoing  12/11/2020 2000 by Marcos Morocho RN  Outcome: Ongoing  12/11/2020 1828 by Camila Rain RN  Outcome: Ongoing     Problem: Skin Integrity:  Goal: Will show no infection signs and symptoms  Description: Will show no infection signs and symptoms  12/12/2020 0754 by Moy Montero RN  Outcome: Ongoing  12/11/2020 2001 by Marcos Morocho RN  Outcome: Ongoing  12/11/2020 2000 by Marcos Morocho RN  Outcome: Ongoing  12/11/2020 1828 by Camila Rain RN  Outcome: Ongoing  Goal: Absence of new skin breakdown  Description: Absence of new skin breakdown  12/12/2020 0754 by Moy Montero RN  Outcome: Ongoing  12/11/2020 2001 by Marcos Morocho RN  Outcome: Ongoing  12/11/2020 2000 by Marcos Morocho RN  Outcome: Ongoing  12/11/2020 1828 by Camila Rain RN  Outcome: Ongoing     Problem: OXYGENATION/RESPIRATORY FUNCTION  Goal: Patient will achieve/maintain normal respiratory rate/effort  Description: Respiratory rate and effort will be within normal limits for the patient  12/12/2020 0754 by Moy Montero RN  Outcome: Ongoing  12/11/2020 2001 by Marcos Morocho RN  Outcome: Ongoing     Problem: FLUID AND ELECTROLYTE IMBALANCE  Goal: Fluid and electrolyte balance are achieved/maintained  12/12/2020 0754 by Moy Montero RN  Outcome: Ongoing  12/11/2020 2001 by Marcos Morocho RN  Outcome: Ongoing     Problem: ACTIVITY INTOLERANCE/IMPAIRED MOBILITY  Goal: Mobility/activity is maintained at optimum level for patient  12/12/2020 0754 by Lety Rivers RN  Outcome: Ongoing  12/11/2020 2001 by Dena Licona RN  Outcome: Ongoing

## 2020-12-13 VITALS
SYSTOLIC BLOOD PRESSURE: 175 MMHG | BODY MASS INDEX: 38.87 KG/M2 | RESPIRATION RATE: 13 BRPM | OXYGEN SATURATION: 98 % | WEIGHT: 293.3 LBS | TEMPERATURE: 98.2 F | DIASTOLIC BLOOD PRESSURE: 79 MMHG | HEART RATE: 60 BPM | HEIGHT: 73 IN

## 2020-12-13 PROBLEM — I50.43 ACUTE ON CHRONIC HEART FAILURE WITH REDUCED EJECTION FRACTION AND DIASTOLIC DYSFUNCTION (HCC): Status: RESOLVED | Noted: 2020-10-22 | Resolved: 2020-12-13

## 2020-12-13 LAB
ANION GAP SERPL CALCULATED.3IONS-SCNC: 6 MMOL/L (ref 4–16)
BUN BLDV-MCNC: 26 MG/DL (ref 6–23)
CALCIUM SERPL-MCNC: 7.9 MG/DL (ref 8.3–10.6)
CHLORIDE BLD-SCNC: 103 MMOL/L (ref 99–110)
CO2: 34 MMOL/L (ref 21–32)
CREAT SERPL-MCNC: 2 MG/DL (ref 0.9–1.3)
GFR AFRICAN AMERICAN: 40 ML/MIN/1.73M2
GFR NON-AFRICAN AMERICAN: 33 ML/MIN/1.73M2
GLUCOSE BLD-MCNC: 119 MG/DL (ref 70–99)
GLUCOSE BLD-MCNC: 62 MG/DL (ref 70–99)
GLUCOSE BLD-MCNC: 85 MG/DL (ref 70–99)
MAGNESIUM: 2.2 MG/DL (ref 1.8–2.4)
POTASSIUM SERPL-SCNC: 4.8 MMOL/L (ref 3.5–5.1)
PRO-BNP: 1585 PG/ML
SODIUM BLD-SCNC: 143 MMOL/L (ref 135–145)

## 2020-12-13 PROCEDURE — 80048 BASIC METABOLIC PNL TOTAL CA: CPT

## 2020-12-13 PROCEDURE — 82962 GLUCOSE BLOOD TEST: CPT

## 2020-12-13 PROCEDURE — 2500000003 HC RX 250 WO HCPCS: Performed by: NURSE PRACTITIONER

## 2020-12-13 PROCEDURE — 6360000002 HC RX W HCPCS: Performed by: INTERNAL MEDICINE

## 2020-12-13 PROCEDURE — 6360000002 HC RX W HCPCS: Performed by: NURSE PRACTITIONER

## 2020-12-13 PROCEDURE — 83735 ASSAY OF MAGNESIUM: CPT

## 2020-12-13 PROCEDURE — 36415 COLL VENOUS BLD VENIPUNCTURE: CPT

## 2020-12-13 PROCEDURE — 2580000003 HC RX 258: Performed by: NURSE PRACTITIONER

## 2020-12-13 PROCEDURE — 90686 IIV4 VACC NO PRSV 0.5 ML IM: CPT | Performed by: INTERNAL MEDICINE

## 2020-12-13 PROCEDURE — 83880 ASSAY OF NATRIURETIC PEPTIDE: CPT

## 2020-12-13 PROCEDURE — 6370000000 HC RX 637 (ALT 250 FOR IP): Performed by: NURSE PRACTITIONER

## 2020-12-13 RX ORDER — ATORVASTATIN CALCIUM 40 MG/1
40 TABLET, FILM COATED ORAL NIGHTLY
Qty: 30 TABLET | Refills: 3 | Status: SHIPPED | OUTPATIENT
Start: 2020-12-13

## 2020-12-13 RX ORDER — DICYCLOMINE HYDROCHLORIDE 10 MG/1
10 CAPSULE ORAL
Qty: 120 CAPSULE | Refills: 3 | Status: ON HOLD | OUTPATIENT
Start: 2020-12-13 | End: 2021-06-09

## 2020-12-13 RX ORDER — CARVEDILOL 3.12 MG/1
3.12 TABLET ORAL 2 TIMES DAILY
Qty: 60 TABLET | Refills: 3 | Status: SHIPPED | OUTPATIENT
Start: 2020-12-13

## 2020-12-13 RX ORDER — LOSARTAN POTASSIUM 25 MG/1
25 TABLET ORAL DAILY
Qty: 30 TABLET | Refills: 3 | Status: ON HOLD | OUTPATIENT
Start: 2020-12-14 | End: 2020-12-27 | Stop reason: HOSPADM

## 2020-12-13 RX ADMIN — ENOXAPARIN SODIUM 40 MG: 40 INJECTION SUBCUTANEOUS at 08:54

## 2020-12-13 RX ADMIN — CLOPIDOGREL BISULFATE 75 MG: 75 TABLET ORAL at 08:53

## 2020-12-13 RX ADMIN — GABAPENTIN 600 MG: 600 TABLET, FILM COATED ORAL at 13:33

## 2020-12-13 RX ADMIN — INFLUENZA A VIRUS A/VICTORIA/2454/2019 IVR-207 (H1N1) ANTIGEN (PROPIOLACTONE INACTIVATED), INFLUENZA A VIRUS A/HONG KONG/2671/2019 IVR-208 (H3N2) ANTIGEN (PROPIOLACTONE INACTIVATED), INFLUENZA B VIRUS B/VICTORIA/705/2018 BVR-11 ANTIGEN (PROPIOLACTONE INACTIVATED), INFLUENZA B VIRUS B/PHUKET/3073/2013 BVR-1B ANTIGEN (PROPIOLACTONE INACTIVATED) 0.5 ML: 15; 15; 15; 15 INJECTION, SUSPENSION INTRAMUSCULAR at 12:45

## 2020-12-13 RX ADMIN — DOXAZOSIN 2 MG: 1 TABLET ORAL at 08:52

## 2020-12-13 RX ADMIN — GABAPENTIN 600 MG: 600 TABLET, FILM COATED ORAL at 08:53

## 2020-12-13 RX ADMIN — LOSARTAN POTASSIUM 25 MG: 25 TABLET ORAL at 08:52

## 2020-12-13 RX ADMIN — DICYCLOMINE HYDROCHLORIDE 10 MG: 10 CAPSULE ORAL at 08:53

## 2020-12-13 RX ADMIN — BUMETANIDE 1 MG: 0.25 INJECTION INTRAMUSCULAR; INTRAVENOUS at 08:53

## 2020-12-13 RX ADMIN — FAMOTIDINE 20 MG: 20 TABLET, FILM COATED ORAL at 08:52

## 2020-12-13 RX ADMIN — CARVEDILOL 3.12 MG: 3.12 TABLET, FILM COATED ORAL at 08:52

## 2020-12-13 RX ADMIN — DICYCLOMINE HYDROCHLORIDE 10 MG: 10 CAPSULE ORAL at 12:11

## 2020-12-13 RX ADMIN — SODIUM CHLORIDE, PRESERVATIVE FREE 10 ML: 5 INJECTION INTRAVENOUS at 08:54

## 2020-12-13 NOTE — PLAN OF CARE
Problem: Falls - Risk of:  Goal: Will remain free from falls  12/13/2020 1711 by Yady Desir RN  Outcome: Completed  12/13/2020 0809 by Yady Desir RN  Outcome: Ongoing  Goal: Absence of physical injury  12/13/2020 1711 by Yady Desir RN  Outcome: Completed  12/13/2020 0809 by Yady Desir RN  Outcome: Ongoing     Problem: Skin Integrity:  Goal: Will show no infection signs and symptoms  12/13/2020 1711 by Yady Desir RN  Outcome: Completed  12/13/2020 0809 by Yady Desir RN  Outcome: Ongoing  Goal: Absence of new skin breakdown  12/13/2020 1711 by Yady Desir RN  Outcome: Completed  12/13/2020 0809 by Yady Desir RN  Outcome: Ongoing     Problem: OXYGENATION/RESPIRATORY FUNCTION  Goal: Patient will achieve/maintain normal respiratory rate/effort  12/13/2020 1711 by Yady Desir RN  Outcome: Completed  12/13/2020 0809 by Yady Desir RN  Outcome: Ongoing     Problem: FLUID AND ELECTROLYTE IMBALANCE  Goal: Fluid and electrolyte balance are achieved/maintained  12/13/2020 1711 by Yady Desir RN  Outcome: Completed  12/13/2020 0809 by Yady Desir RN  Outcome: Ongoing     Problem: ACTIVITY INTOLERANCE/IMPAIRED MOBILITY  Goal: Mobility/activity is maintained at optimum level for patient  12/13/2020 1711 by Yady Desir RN  Outcome: Completed  12/13/2020 0809 by Yady Desir RN  Outcome: Ongoing

## 2020-12-13 NOTE — PLAN OF CARE
Problem: Falls - Risk of:  Goal: Will remain free from falls  Description: Will remain free from falls  12/12/2020 2043 by Kory Muñiz RN  Outcome: Ongoing  12/12/2020 0754 by Tamara Gilbert RN  Outcome: Ongoing  Goal: Absence of physical injury  Description: Absence of physical injury  12/12/2020 2043 by Kory Muñiz RN  Outcome: Ongoing  12/12/2020 0754 by Tamara Gilbert RN  Outcome: Ongoing     Problem: Skin Integrity:  Goal: Will show no infection signs and symptoms  Description: Will show no infection signs and symptoms  12/12/2020 2043 by Kory Muñiz RN  Outcome: Ongoing  12/12/2020 0754 by Tamara Gilbert RN  Outcome: Ongoing  Goal: Absence of new skin breakdown  Description: Absence of new skin breakdown  12/12/2020 2043 by Kory Muñiz RN  Outcome: Ongoing  12/12/2020 0754 by Tamara Gilbert RN  Outcome: Ongoing     Problem: OXYGENATION/RESPIRATORY FUNCTION  Goal: Patient will achieve/maintain normal respiratory rate/effort  Description: Respiratory rate and effort will be within normal limits for the patient  12/12/2020 2043 by Kory Muñiz RN  Outcome: Ongoing  12/12/2020 0754 by Tamara Gilbert RN  Outcome: Ongoing     Problem: FLUID AND ELECTROLYTE IMBALANCE  Goal: Fluid and electrolyte balance are achieved/maintained  12/12/2020 2043 by Kory Muñiz RN  Outcome: Ongoing  12/12/2020 0754 by Tamara Gilbert RN  Outcome: Ongoing     Problem: ACTIVITY INTOLERANCE/IMPAIRED MOBILITY  Goal: Mobility/activity is maintained at optimum level for patient  12/12/2020 2043 by Kory Muñiz RN  Outcome: Ongoing  12/12/2020 0754 by Tamara Gilbert RN  Outcome: Ongoing

## 2020-12-13 NOTE — PLAN OF CARE
Problem: Falls - Risk of:  Goal: Will remain free from falls  12/13/2020 0809 by Jus Sheikh RN  Outcome: Ongoing  12/12/2020 2043 by Rochelle Matos RN  Outcome: Ongoing  Goal: Absence of physical injury  12/13/2020 0809 by Jus Sheikh RN  Outcome: Ongoing  12/12/2020 2043 by Rochelle Matos RN  Outcome: Ongoing     Problem: Skin Integrity:  Goal: Will show no infection signs and symptoms  12/13/2020 0809 by Jus Sheikh RN  Outcome: Ongoing  12/12/2020 2043 by Rochelle Matos RN  Outcome: Ongoing  Goal: Absence of new skin breakdown  12/13/2020 0809 by Jus Sheikh RN  Outcome: Ongoing  12/12/2020 2043 by Rochelle Matos RN  Outcome: Ongoing     Problem: OXYGENATION/RESPIRATORY FUNCTION  Goal: Patient will achieve/maintain normal respiratory rate/effort  12/13/2020 0809 by Jus Sheikh RN  Outcome: Ongoing  12/12/2020 2043 by Rochelle Matos RN  Outcome: Ongoing     Problem: FLUID AND ELECTROLYTE IMBALANCE  Goal: Fluid and electrolyte balance are achieved/maintained  12/13/2020 0809 by Jus Sheikh RN  Outcome: Ongoing  12/12/2020 2043 by Rochelle Matos RN  Outcome: Ongoing     Problem: ACTIVITY INTOLERANCE/IMPAIRED MOBILITY  Goal: Mobility/activity is maintained at optimum level for patient  12/13/2020 0809 by Jus Sheikh RN  Outcome: Ongoing  12/12/2020 2043 by Rochelle Matos RN  Outcome: Ongoing

## 2020-12-13 NOTE — DISCHARGE SUMMARY
Discharge Summary    Name:  Cordella Goodell /Age/Sex: 1950  (79 y.o. male)   MRN & CSN:  [id number] & [id number] Admission Date/Time: 2020  7:58 PM   Attending:  No att. providers found Discharging Physician: Hu Martinez MD     HPI:   79year-old -American male presents to the ED with complaints of bilateral leg swelling over the last 3 days significant weight gain and shortness of breath. Patient states he was noncompliant during Thanksgiving season. However, understands this was the cause. Denies headache blurred vision or dizziness. Denies any chest pain or palpitations. Denies fever chills nausea or vomiting. Patient states he has medication at home. I asked if he did daily weights at home monitor his water intake and had a plan for any worsening of symptoms to minimize hospitalization. Patient stated he never heard it but sound like a good idea. States shortness of breath worsened with exertion. Does not walk much as wound on left foot not necessarily painful but surgeon told him to not weight-bear due to worsening wound. Hospital Course:     1. Acute exacerbation of CHFrEF: Patiently initially brought in for same. Initiated diuresis with Lasix in the ED continued on intake. Patient had 2 bouts of incontinence overnight on admission unable to obtain output. However, on day 2 heart failure nurse changed Lasix to Bumex 1 mg IV twice daily. 2700 mL output overnight with a -2040 mL balance day day 3 overall patient had approximately 6 L diuresed. With a 15 pound weight loss. Initially needing BiPAP on intake wean to supplemental oxygen discontinued prior to discharge with good O2 sats. Lower extremity edema improved as well as scrotal swelling. EF to 40 to 45% grade 1 DD severe LVH. Patient given strict instructions with contact information referred to heart failure clinic. We would have made the appointment however  they discharge.   Patient discharged on home medications until follow-up with salt and water restrictions and daily weight instructions.     2. Hypertension: Elevated this a.m 175/79. However, patient not have a.m. meds. However, controlled throughout the day during hospital stay. New dose of losartan 25 mg daily from 100 mg daily discontinued amlodipine per cardiology recommendations. Patient to follow-up with primary in heart failure clinic for same.     3. Insulin-dependent diabetes: Initially POC glucose elevated. However with diet restriction in the hospital patient started having lows overnight that were concerning. Day prior to discharge early morning 52 recheck 82. I discontinued dulaglutide and long-acting insulin given at nighttime. Patient still with good glucose control. Discharged on low-dose sliding scale. Patient to follow-up with PCP for reevaluation. 4.  Chronic elevated troponin: In setting of CHF, CAD and infection. No chest pain throughout hospital stay. Type II injury noted. Recent stress test with no evidence of ischemia. Continue Coreg aspirin and Plavix     5. CAD: PCI 11/2018 patent stents, continue GDMT. Remote pacemaker placement as well.     6. STACI: Patient noncompliant with BiPAP overnight. Appropriate O2 sats on 2 L supplemental at 99% will attempt to wean from same during the day as the patient is more alert less tired.     7. CKD: Creatinine 2.3 from 2.2 could be lab error. However, diuresing in the setting of CHF with some contraction alkalosis noted. Have held metolazone in the setting of Bumex. Will monitor overnight consider adding gentle hydration to minimize kidney injury. Most likely combination of diabetic nephropathy and hypertension     8. Lower extremity wounds: Patient had wound care throughout. Had only minimal pain needing tramadol 50 mg total 2 doses during hospital stay. Arterial Dopplers demonstrated dampened waveforms left lower extremity with high-grade stenosis.   However, chronic. Patient does have stents lower extremities previously as he describes. Given the chronicity lack of pain and discussion with the patient preference is to follow-up with PCP I did recommend consult to vascular surgery. Patient agrees to same. However, also states that a general surgery been following but greater than 10 months ago.     9. Morbid obesity 40 on intake: However, patient has lost approximately 15 pounds weight loss during hospital stay BMI now 38.70. However, did discuss minimizing breads starches and sweets. Patient states he will attempt same as he wants to get better.     The patient expressed appropriate understanding of and agreement with the discharge recommendations, medications, and plan. Patient did state he has not seen cardiologist in a while does not want to follow-up with same. Recommend that he discuss with PCP referral to cardiology as the patient definitely needs continued monitoring other than heart failure clinic.     Consults this admission:  IP CONSULT TO CARDIOLOGY  IP CONSULT TO RESPIRATORY CARE    Discharge Instruction:   Follow up appointments: Heart failure clinic within 5 days, nephrology within 1 week, PCP within 5 days      Diet:  cardiac diet   Activity: activity as tolerated minimizing weightbearing on left foot   Disposition: Discharged to:   [x]Home, [][facility], []SNF, []Acute Rehab, []Hospice  Condition on discharge: Stable    Discharge Medications:      Pamella Alford, [address] Medication Instructions FMF:[id number]    Printed on:12/13/20 1721   Medication Information                      acetaminophen (TYLENOL) 325 MG tablet  Take 2 tablets by mouth every 6 hours as needed for Pain             albuterol sulfate HFA (VENTOLIN HFA) 108 (90 Base) MCG/ACT inhaler  Inhale 2 puffs into the lungs every 4 hours as needed for Wheezing             atorvastatin (LIPITOR) 40 MG tablet  Take 1 tablet by mouth nightly             BD ULTRA-FINE PEN NEEDLES 29G X 12.7MM MISC               Blood Pressure KIT  BID for record down in log book             Calcium Alginate (ALGICELL CALCIUM DRESSING 4\"X8) MISC  Apply 1 Device topically daily             carvedilol (COREG) 3.125 MG tablet  Take 1 tablet by mouth 2 times daily             clopidogrel (PLAVIX) 75 MG tablet  Take 1 tablet by mouth daily             dicyclomine (BENTYL) 10 MG capsule  Take 1 capsule by mouth 4 times daily (before meals and nightly)             doxazosin (CARDURA) 2 MG tablet  TAKE 1 TABLET BY MOUTH EVERY DAY IN THE EVENING             gabapentin (NEURONTIN) 600 MG tablet  Take 600 mg by mouth 3 times daily. insulin lispro (HUMALOG) 100 UNIT/ML injection vial  Inject 0-6 Units into the skin 3 times daily (with meals)             losartan (COZAAR) 25 MG tablet  Take 1 tablet by mouth daily             metOLazone (ZAROXOLYN) 5 MG tablet  Take 5 mg by mouth daily             PROMOGRAN COREY WOUND DRESSING MATRIX  Apply topically Apply to left daily and cover with gauze             torsemide (DEMADEX) 100 MG tablet  Take 100 mg by mouth 2 times daily                 Objective Findings at Discharge:   BP (!) 175/79   Pulse 60   Temp 98.2 °F (36.8 °C)   Resp 13   Ht 6' 1\" (1.854 m)   Wt 293 lb 4.8 oz (133 kg)   SpO2 98%   BMI 38.70 kg/m²            PHYSICAL EXAM   GEN Awake male, sitting upright in bed in no apparent distress. Appears given age. EYES Pupils are equally round. No scleral erythema, discharge, or conjunctivitis. HENT Mucous membranes are moist.   NECK No apparent thyromegaly or masses. RESP Clear to auscultation but diminished, no wheezes, rales or rhonchi. Symmetric chest movement while on room air. CARDIO/VASC S1/S2 auscultated. Regular rate paced without appreciable murmurs, rubs, or gallops. Peripheral pulses equal bilaterally and palpable. 1+2 peripheral edema bilaterally compared to 3+4 on intake.   GI Abdomen protuberant is soft without significant tenderness, masses, or guarding. Bowel sounds are normoactive. Rectal exam deferred.  Fuller catheter is not present. Scrotal swelling improved but persistent  HEME/LYMPH No petechiae or ecchymoses. MSK third and fifth digit on right foot amputated healed. Spontaneous movement of all extremities  SKIN Normal coloration, warm, dry. Left foot greater toe bandaged secured. NEURO Cranial nerves appear grossly intact, normal speech, no lateralizing weakness. PSYCH Awake, alert, oriented x 4. Affect appropriate. BMP/CBC  Recent Labs     12/11/20  0700 12/12/20  0545 12/13/20  0600    143 143   K 5.0 4.8 4.8    103 103   CO2 31 34* 34*   BUN 24* 28* 26*   CREATININE 2.2* 2.3* 2.0*   WBC 5.9  --   --    HCT 33.7*  --   --      --   --        IMAGING: Bilateral lower extremity Dopplers arterial on 12/11/2020: Impression   1. Nonvisualization of the left profunda, left TE, left mid to distal   peroneal artery, and right peroneal artery. 2. Dampened waveforms are seen throughout the bilateral lower extremities. 3. Suggestive of high-grade stenosis involving the right distal superficial   femoral artery and proximal popliteal artery.         Recommend patient follow-up with primary for consult vascular surgery versus cardiology for stenting. Chronic minimal pain. Patient agrees with same    Chest x-ray on intake:  FINDINGS:   The lungs are without consolidation or effusion. Jeffrey Spatz is no pneumothorax.    The heart is enlarged with stable cardiac leads.  The upper abdomen is   unremarkable.  The extrathoracic soft tissues are unremarkable.           Impression   Cardiomegaly without acute pulmonary process.         X-ray left foot on intake:  Impression   Nondisplaced fracture of the distal 1st phalanx.       Diffuse soft tissue swelling.         Patient to follow-up with primary patient states follows up with general surgery however last seen in February 2020      Discharge Time of 45

## 2020-12-14 LAB
EKG ATRIAL RATE: 64 BPM
EKG DIAGNOSIS: NORMAL
EKG P-R INTERVAL: 276 MS
EKG Q-T INTERVAL: 500 MS
EKG QRS DURATION: 98 MS
EKG QTC CALCULATION (BAZETT): 507 MS
EKG R AXIS: 45 DEGREES
EKG T AXIS: 49 DEGREES
EKG VENTRICULAR RATE: 62 BPM

## 2020-12-14 NOTE — ED PROVIDER NOTES
Emergency Department Encounter    Patient: Gabriel Meade  MRN: [id number]  : 1950  Date of Evaluation: 2020  ED Provider:  Lucina Lciea    Triage Chief Complaint:   Leg Swelling (C/o bilateral leg swelling for the past 3 days. Patient states his testicles are starting to swell now and minimal shortness of breath. Patient has CKD and wound on the left foot (Dr. Carolyn Henriquez))    [place]:  Gabriel Meade is a 79 y.o. male with a past medical history of CHF, coronary disease, hypertension, PAD, PVD, chronic lower extremity edema, rebeca  that presents with worsening lower leg swelling x3 days. Patient does not keep a log of his weights. Patient reports he does have some shortness of breath especially with lying flat. No chest pain. No shortness of breath at rest.  No abdominal pain, nausea, vomiting or diarrhea. ROS - see HPI, below listed is current ROS at time of my eval:  Unable to fully obtain given clinical condition    Past Medical History:   Diagnosis Date    Acid reflux     Acute MI ([address] ,     Arthritis     Back    Broken teeth     Upper Front    CAD (coronary artery disease)     Sees Dr. Juancarlos Gongora [facility])     per old chart    CHF (congestive heart failure) ([address]     Chronic back pain     Chronic kidney disease     STAGE 3 KIDNEY FAILURE- \"from my diabetes- do not follow with any one- have seen Dr Ata Pat in the past\"    Diabetes mellitus ([address] Dx 1965    per old chart pt has been diabetic since age 13    Diabetic neuropathy ([address]     \"in my feet\"    H/O cardiovascular stress test 2016    H/O Doppler ultrasound 2018    Moderate disease of the right lower extremity with an JALEN of 0.72.   Moderate to severe disease of the left lower extremity with an JALEN of 0.55.   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    Neuropathy     both feet    neuropathy     PAD (peripheral artery disease) ([facility] 09/27/2018    PVD (peripheral vascular disease) ([facility]     Sick sinus syndrome ([facility]     Sleep apnea     \"sleep study 3 yrs ago- could not tolerate the cpap made me too dry\"    Spinal stenosis     Teeth missing     Upper And Lower    Type 2 diabetes mellitus without complication [facility])      Past Surgical History:   Procedure Laterality Date    CARDIAC CATHETERIZATION      per old chart done 10/2014    CARDIAC CATHETERIZATION  07/14/2017    with angiography of leg    CARDIAC CATHETERIZATION  11/20/2018    PATENT STENTS OF ALL THREE MAJOR VESSELS    CARDIAC DEFIBRILLATOR PLACEMENT  06/04/2010    [employer] [address] Defibrillator Implanted    COLECTOMY Right 08/26/2016    laparascopic; robotic assisted    COLONOSCOPY  08/04/2016    CORONARY ANGIOPLASTY      \"15 Heart Stents\"    CORONARY ANGIOPLASTY WITH STENT PLACEMENT      per old chart had angio with stent to circumflex and obtuse marginal artery at [facility] 5/2010( old chart also gives hx of stent placement done 2000,2004 and 2005)   [address]      Teeth Extracted In Past    PACEMAKER PLACEMENT  06/04/2010    [employer] [address] Defibrillator Implanted    TOE AMPUTATION Right 09/12/2017    Rt 3rd toe    TOE AMPUTATION Right 01/09/2018     Right 5th toe amputation and Toenails trimmed left 2,3,4 and 5th toes    VASCULAR SURGERY      per old chart had balloon angioplasty right superfical femoral artery,right popliteal artery,,right ant.tibial artery, right tibioperoneal trunk, and right post.tibial artery wna stent placement right popliteal artery and superfical femoral artery 7/2012     Family History   Problem Relation Age of Onset  Diabetes Mother     Stroke Mother     High Blood Pressure Mother     Vision Loss Mother     Cancer Father         Prostate Cancer    Diabetes Sister     Neuropathy Sister     Other Sister         \"Breathing Problems\"    Heart Disease Sister     Early Death Sister 62        Heart Complications    Cancer Brother         \"Stomach Cancer\"    High Blood Pressure Brother     Diabetes Brother     Heart Disease Brother     High Blood Pressure Brother     Cancer Son         \"Testicle Cancer\"     Social History     Socioeconomic History    Marital status:       Spouse name: Not on file    Number of children: Not on file    Years of education: Not on file    Highest education level: Not on file   Occupational History    Not on file   Social Needs    Financial resource strain: Not on file    Food insecurity     Worry: Not on file     Inability: Not on file    Transportation needs     Medical: Not on file     Non-medical: Not on file   Tobacco Use    Smoking status: Former Smoker     Packs/day: 0.00     Years: 36.00     Pack years: 0.00     Types: Cigars     Start date: 1/1/1980    Smokeless tobacco: Never Used   Substance and Sexual Activity    Alcohol use: No     Frequency: Never    Drug use: Yes     Types: Marijuana    Sexual activity: Never   Lifestyle    Physical activity     Days per week: Not on file     Minutes per session: Not on file    Stress: Not on file   Relationships    Social connections     Talks on phone: Not on file     Gets together: Not on file     Attends Buddhist service: Not on file     Active member of club or organization: Not on file     Attends meetings of clubs or organizations: Not on file     Relationship status: Not on file    Intimate partner violence     Fear of current or ex partner: Not on file     Emotionally abused: Not on file     Physically abused: Not on file     Forced sexual activity: Not on file   Other Topics Concern    Not on file   Social History Narrative    Not on file     No current facility-administered medications for this encounter. Current Outpatient Medications   Medication Sig Dispense Refill    insulin lispro (HUMALOG) 100 UNIT/ML injection vial Inject 0-6 Units into the skin 3 times daily (with meals) 1 vial 3    atorvastatin (LIPITOR) 40 MG tablet Take 1 tablet by mouth nightly 30 tablet 3    losartan (COZAAR) 25 MG tablet Take 1 tablet by mouth daily 30 tablet 3    carvedilol (COREG) 3.125 MG tablet Take 1 tablet by mouth 2 times daily 60 tablet 3    dicyclomine (BENTYL) 10 MG capsule Take 1 capsule by mouth 4 times daily (before meals and nightly) 120 capsule 3    metOLazone (ZAROXOLYN) 5 MG tablet Take 5 mg by mouth daily      torsemide (DEMADEX) 100 MG tablet Take 100 mg by mouth 2 times daily      acetaminophen (TYLENOL) 325 MG tablet Take 2 tablets by mouth every 6 hours as needed for Pain 120 tablet 3    doxazosin (CARDURA) 2 MG tablet TAKE 1 TABLET BY MOUTH EVERY DAY IN THE EVENING 90 tablet 1    Calcium Alginate (ALGICELL CALCIUM DRESSING 4\"X8) MISC Apply 1 Device topically daily 30 each 3    clopidogrel (PLAVIX) 75 MG tablet Take 1 tablet by mouth daily 30 tablet 11    gabapentin (NEURONTIN) 600 MG tablet Take 600 mg by mouth 3 times daily.        albuterol sulfate HFA (VENTOLIN HFA) 108 (90 Base) MCG/ACT inhaler Inhale 2 puffs into the lungs every 4 hours as needed for Wheezing 1 Inhaler 3    PROMOGRAN COREY WOUND DRESSING MATRIX Apply topically Apply to left daily and cover with gauze 1 Package 3    BD ULTRA-FINE PEN NEEDLES 29G X 12.7MM MISC       Blood Pressure KIT BID for record down in log book 1 kit 0     Allergies   Allergen Reactions    Pcn [Penicillins] Hives    Fentanyl Itching       Nursing Notes Reviewed    Physical Exam:  Triage VS:    ED Triage Vitals [12/09/20 2009]   Enc Vitals Group      BP (!) 141/58      Pulse 68      Resp 13      Temp 97.9 °F (36.6 °C)      Temp Source Oral      SpO2 9:07 pm COMPARISON: Chest radiograph performed 10/28/2020. HISTORY: ORDERING SYSTEM PROVIDED HISTORY: CHF TECHNOLOGIST PROVIDED HISTORY: Reason for exam:->CHF Reason for Exam: left toe pain; please evaluate for signs of osteomyelitis in first toe Acuity: Acute Type of Exam: Initial FINDINGS: The lungs are without consolidation or effusion. There is no pneumothorax. The heart is enlarged with stable cardiac leads. The upper abdomen is unremarkable. The extrathoracic soft tissues are unremarkable. Cardiomegaly without acute pulmonary process. EKG (if obtained): (All EKG's are interpreted by myself in the absence of a cardiologist)  Atrial paced rhythm rate 64  QTC increased  No specific ST or T wave changes      Chart review shows recent radiographs:  Xr Foot Left (min 3 Views)    Result Date: 12/9/2020  EXAMINATION: THREE XRAY VIEWS OF THE LEFT FOOT 12/9/2020 9:07 pm COMPARISON: None. HISTORY: ORDERING SYSTEM PROVIDED HISTORY: left toe pain; please evaluate for signs of osteomyelitis in first toe TECHNOLOGIST PROVIDED HISTORY: Reason for exam:->left toe pain; please evaluate for signs of osteomyelitis in first toe Reason for Exam: left toe pain; please evaluate for signs of osteomyelitis in first toe Acuity: Acute Type of Exam: Initial FINDINGS: There is diffuse osseous demineralization. There may be a fracture at the distal tip of the 1st distal phalanx. There is diffuse soft tissue swelling. Is diffuse degenerative joint disease. Nondisplaced fracture of the distal 1st phalanx. Diffuse soft tissue swelling. Xr Chest Portable    Result Date: 12/9/2020  EXAMINATION: ONE XRAY VIEW OF THE CHEST 12/9/2020 9:07 pm COMPARISON: Chest radiograph performed 10/28/2020.  HISTORY: ORDERING SYSTEM PROVIDED HISTORY: CHF TECHNOLOGIST PROVIDED HISTORY: Reason for exam:->CHF Reason for Exam: left toe pain; please evaluate for signs of osteomyelitis in first toe Acuity: Acute Type of Exam: Initial FINDINGS: The lungs are without consolidation or effusion. There is no pneumothorax. The heart is enlarged with stable cardiac leads. The upper abdomen is unremarkable. The extrathoracic soft tissues are unremarkable. Cardiomegaly without acute pulmonary process. Vl Dup Lower Extremity Arteries Bilateral    Result Date: 12/12/2020  EXAMINATION: ARTERIAL DUPLEX ULTRASOUND OF THE BILATERAL LOWER EXTREMITIES  12/11/2020 2:36 pm TECHNIQUE: Duplex ultrasound and Doppler images were obtained of the bilateral lower extremities COMPARISON: None HISTORY: ORDERING SYSTEM PROVIDED HISTORY: pvd TECHNOLOGIST PROVIDED HISTORY: Reason for exam:->pvd Reason for Exam: swelling FINDINGS: Right: Flow velocities were measured as follows: Com. Fem. 88 cm/sec Prof.            103 cm/sec SFA Prox. 47 cm/sec SFA Mid. 100 cm/sec SFA Dist.      73 cm/sec Pop.             32 cm/sec PTA             62, 57, 42 cm/sec Peron. Not visualized TE             41, 40, 73 cm/sec Dampened waveforms are present. Left: Flow velocities were measured as follows: Com. Fem. 81 cm/sec Prof.            Not visualized SFA Prox. 43 cm/sec SFA Mid.      38 cm/sec SFA Dist.      41 cm/sec Pop.             42 cm/sec PTA             46, 37, 43 cm/sec Peron. 37 cm/sec proximally. Mid to distal artery are not visualized. TE             Not visualized. Dampened waveforms are present. 1. Nonvisualization of the left profunda, left TE, left mid to distal peroneal artery, and right peroneal artery. 2. Dampened waveforms are seen throughout the bilateral lower extremities. 3. Suggestive of high-grade stenosis involving the right distal superficial femoral artery and proximal popliteal artery. MDM:  Patient presenting with bilateral lower extremity edema and report of mild sob. Clinically mostly likely with an acute congestive heart failure exacerbation. On arrival was placed on the monitor, labs, x-ray ordered.   Patient Efforts were made to edit the dictations.      Yen Rios MD  12/14/20 2004

## 2020-12-15 ENCOUNTER — TELEPHONE (OUTPATIENT)
Dept: CARDIOLOGY CLINIC | Age: 70
End: 2020-12-15

## 2020-12-17 ENCOUNTER — OFFICE VISIT (OUTPATIENT)
Dept: SURGERY | Age: 70
End: 2020-12-17
Payer: MEDICARE

## 2020-12-17 ENCOUNTER — TELEPHONE (OUTPATIENT)
Dept: CARDIOLOGY CLINIC | Age: 70
End: 2020-12-17

## 2020-12-17 VITALS
HEART RATE: 71 BPM | HEIGHT: 73 IN | OXYGEN SATURATION: 93 % | BODY MASS INDEX: 38.87 KG/M2 | TEMPERATURE: 96.7 F | DIASTOLIC BLOOD PRESSURE: 90 MMHG | WEIGHT: 293.3 LBS | SYSTOLIC BLOOD PRESSURE: 142 MMHG

## 2020-12-17 DIAGNOSIS — I25.10 CHRONIC CORONARY ARTERY DISEASE: Primary | ICD-10-CM

## 2020-12-17 DIAGNOSIS — I96 TOE GANGRENE (HCC): ICD-10-CM

## 2020-12-17 PROCEDURE — 99213 OFFICE O/P EST LOW 20 MIN: CPT | Performed by: SURGERY

## 2020-12-17 PROCEDURE — 1111F DSCHRG MED/CURRENT MED MERGE: CPT | Performed by: SURGERY

## 2020-12-17 PROCEDURE — 4040F PNEUMOC VAC/ADMIN/RCVD: CPT | Performed by: SURGERY

## 2020-12-17 PROCEDURE — 1036F TOBACCO NON-USER: CPT | Performed by: SURGERY

## 2020-12-17 PROCEDURE — 1123F ACP DISCUSS/DSCN MKR DOCD: CPT | Performed by: SURGERY

## 2020-12-17 PROCEDURE — G8417 CALC BMI ABV UP PARAM F/U: HCPCS | Performed by: SURGERY

## 2020-12-17 PROCEDURE — 3017F COLORECTAL CA SCREEN DOC REV: CPT | Performed by: SURGERY

## 2020-12-17 PROCEDURE — G8427 DOCREV CUR MEDS BY ELIG CLIN: HCPCS | Performed by: SURGERY

## 2020-12-17 PROCEDURE — G8482 FLU IMMUNIZE ORDER/ADMIN: HCPCS | Performed by: SURGERY

## 2020-12-17 RX ORDER — PREGABALIN 150 MG/1
CAPSULE ORAL
Status: ON HOLD | COMMUNITY
Start: 2020-11-23 | End: 2021-06-09

## 2020-12-20 ENCOUNTER — HOSPITAL ENCOUNTER (INPATIENT)
Age: 70
LOS: 6 days | Discharge: HOME | DRG: 252 | End: 2020-12-27
Attending: INTERNAL MEDICINE | Admitting: INTERNAL MEDICINE
Payer: MEDICARE

## 2020-12-20 DIAGNOSIS — N50.89 SCROTAL EDEMA: ICD-10-CM

## 2020-12-20 DIAGNOSIS — L03.116 CELLULITIS OF LEFT LOWER EXTREMITY: ICD-10-CM

## 2020-12-20 DIAGNOSIS — E11.621 DIABETIC ULCER OF TOE OF LEFT FOOT ASSOCIATED WITH TYPE 2 DIABETES MELLITUS, UNSPECIFIED ULCER STAGE (HCC): ICD-10-CM

## 2020-12-20 DIAGNOSIS — G89.4 CHRONIC PAIN SYNDROME: ICD-10-CM

## 2020-12-20 DIAGNOSIS — R09.02 HYPOXIA: ICD-10-CM

## 2020-12-20 DIAGNOSIS — I50.9 ACUTE ON CHRONIC CONGESTIVE HEART FAILURE, UNSPECIFIED HEART FAILURE TYPE (HCC): Primary | ICD-10-CM

## 2020-12-20 DIAGNOSIS — L97.529 DIABETIC ULCER OF TOE OF LEFT FOOT ASSOCIATED WITH TYPE 2 DIABETES MELLITUS, UNSPECIFIED ULCER STAGE (HCC): ICD-10-CM

## 2020-12-20 PROCEDURE — 99285 EMERGENCY DEPT VISIT HI MDM: CPT

## 2020-12-20 PROCEDURE — 99291 CRITICAL CARE FIRST HOUR: CPT

## 2020-12-20 RX ORDER — FUROSEMIDE 10 MG/ML
40 INJECTION INTRAMUSCULAR; INTRAVENOUS ONCE
Status: COMPLETED | OUTPATIENT
Start: 2020-12-21 | End: 2020-12-21

## 2020-12-20 ASSESSMENT — PAIN DESCRIPTION - PAIN TYPE: TYPE: ACUTE PAIN

## 2020-12-20 ASSESSMENT — PAIN DESCRIPTION - ONSET: ONSET: ON-GOING

## 2020-12-20 ASSESSMENT — PAIN DESCRIPTION - DESCRIPTORS: DESCRIPTORS: THROBBING;SHARP

## 2020-12-20 ASSESSMENT — PAIN DESCRIPTION - ORIENTATION: ORIENTATION: LEFT

## 2020-12-20 ASSESSMENT — PAIN SCALES - GENERAL: PAINLEVEL_OUTOF10: 10

## 2020-12-21 ENCOUNTER — APPOINTMENT (OUTPATIENT)
Dept: GENERAL RADIOLOGY | Age: 70
DRG: 252 | End: 2020-12-21
Payer: MEDICARE

## 2020-12-21 PROBLEM — I50.23 ACUTE ON CHRONIC SYSTOLIC CHF (CONGESTIVE HEART FAILURE) (HCC): Status: ACTIVE | Noted: 2020-12-21

## 2020-12-21 LAB
ALBUMIN SERPL-MCNC: 3.3 GM/DL (ref 3.4–5)
ALP BLD-CCNC: 73 IU/L (ref 40–129)
ALT SERPL-CCNC: 6 U/L (ref 10–40)
ANION GAP SERPL CALCULATED.3IONS-SCNC: 6 MMOL/L (ref 4–16)
AST SERPL-CCNC: 11 IU/L (ref 15–37)
BACTERIA: NEGATIVE /HPF
BASOPHILS ABSOLUTE: 0 K/CU MM
BASOPHILS RELATIVE PERCENT: 0.3 % (ref 0–1)
BILIRUB SERPL-MCNC: 0.4 MG/DL (ref 0–1)
BILIRUBIN URINE: NEGATIVE MG/DL
BLOOD, URINE: NEGATIVE
BUN BLDV-MCNC: 14 MG/DL (ref 6–23)
CALCIUM SERPL-MCNC: 8.3 MG/DL (ref 8.3–10.6)
CHLORIDE BLD-SCNC: 103 MMOL/L (ref 99–110)
CLARITY: CLEAR
CO2: 28 MMOL/L (ref 21–32)
COLOR: ABNORMAL
CREAT SERPL-MCNC: 1.6 MG/DL (ref 0.9–1.3)
DIFFERENTIAL TYPE: ABNORMAL
EOSINOPHILS ABSOLUTE: 0.1 K/CU MM
EOSINOPHILS RELATIVE PERCENT: 1.2 % (ref 0–3)
GFR AFRICAN AMERICAN: 52 ML/MIN/1.73M2
GFR NON-AFRICAN AMERICAN: 43 ML/MIN/1.73M2
GLUCOSE BLD-MCNC: 192 MG/DL (ref 70–99)
GLUCOSE BLD-MCNC: 201 MG/DL (ref 70–99)
GLUCOSE BLD-MCNC: 219 MG/DL (ref 70–99)
GLUCOSE, URINE: NEGATIVE MG/DL
HCT VFR BLD CALC: 36.4 % (ref 42–52)
HEMOGLOBIN: 10.9 GM/DL (ref 13.5–18)
IMMATURE NEUTROPHIL %: 0.3 % (ref 0–0.43)
KETONES, URINE: NEGATIVE MG/DL
LACTATE: 1.3 MMOL/L (ref 0.4–2)
LEUKOCYTE ESTERASE, URINE: NEGATIVE
LYMPHOCYTES ABSOLUTE: 1.2 K/CU MM
LYMPHOCYTES RELATIVE PERCENT: 12 % (ref 24–44)
MCH RBC QN AUTO: 29.1 PG (ref 27–31)
MCHC RBC AUTO-ENTMCNC: 29.9 % (ref 32–36)
MCV RBC AUTO: 97.1 FL (ref 78–100)
MONOCYTES ABSOLUTE: 0.6 K/CU MM
MONOCYTES RELATIVE PERCENT: 5.9 % (ref 0–4)
NITRITE URINE, QUANTITATIVE: NEGATIVE
NUCLEATED RBC %: 0 %
PDW BLD-RTO: 14.3 % (ref 11.7–14.9)
PH, URINE: 7 (ref 5–8)
PLATELET # BLD: 260 K/CU MM (ref 140–440)
PMV BLD AUTO: 10.5 FL (ref 7.5–11.1)
POTASSIUM SERPL-SCNC: 4.5 MMOL/L (ref 3.5–5.1)
PRO-BNP: 3684 PG/ML
PROTEIN UA: 30 MG/DL
RBC # BLD: 3.75 M/CU MM (ref 4.6–6.2)
RBC URINE: ABNORMAL /HPF (ref 0–3)
SARS-COV-2, NAAT: NOT DETECTED
SEGMENTED NEUTROPHILS ABSOLUTE COUNT: 8 K/CU MM
SEGMENTED NEUTROPHILS RELATIVE PERCENT: 80.3 % (ref 36–66)
SODIUM BLD-SCNC: 137 MMOL/L (ref 135–145)
SOURCE: NORMAL
SPECIFIC GRAVITY UA: 1.01 (ref 1–1.03)
SQUAMOUS EPITHELIAL: <1 /HPF
TOTAL IMMATURE NEUTOROPHIL: 0.03 K/CU MM
TOTAL NUCLEATED RBC: 0 K/CU MM
TOTAL PROTEIN: 7.2 GM/DL (ref 6.4–8.2)
TRICHOMONAS: ABNORMAL /HPF
TROPONIN T: 0.02 NG/ML
UROBILINOGEN, URINE: NORMAL MG/DL (ref 0.2–1)
WBC # BLD: 10 K/CU MM (ref 4–10.5)
WBC UA: <1 /HPF (ref 0–2)

## 2020-12-21 PROCEDURE — 36415 COLL VENOUS BLD VENIPUNCTURE: CPT

## 2020-12-21 PROCEDURE — 73630 X-RAY EXAM OF FOOT: CPT

## 2020-12-21 PROCEDURE — 6370000000 HC RX 637 (ALT 250 FOR IP): Performed by: INTERNAL MEDICINE

## 2020-12-21 PROCEDURE — 6360000002 HC RX W HCPCS: Performed by: PHYSICIAN ASSISTANT

## 2020-12-21 PROCEDURE — 80053 COMPREHEN METABOLIC PANEL: CPT

## 2020-12-21 PROCEDURE — 83880 ASSAY OF NATRIURETIC PEPTIDE: CPT

## 2020-12-21 PROCEDURE — 99223 1ST HOSP IP/OBS HIGH 75: CPT | Performed by: INTERNAL MEDICINE

## 2020-12-21 PROCEDURE — 2500000003 HC RX 250 WO HCPCS: Performed by: INTERNAL MEDICINE

## 2020-12-21 PROCEDURE — 85025 COMPLETE CBC W/AUTO DIFF WBC: CPT

## 2020-12-21 PROCEDURE — 6360000002 HC RX W HCPCS: Performed by: INTERNAL MEDICINE

## 2020-12-21 PROCEDURE — 87040 BLOOD CULTURE FOR BACTERIA: CPT

## 2020-12-21 PROCEDURE — 82962 GLUCOSE BLOOD TEST: CPT

## 2020-12-21 PROCEDURE — 93005 ELECTROCARDIOGRAM TRACING: CPT | Performed by: PHYSICIAN ASSISTANT

## 2020-12-21 PROCEDURE — 71045 X-RAY EXAM CHEST 1 VIEW: CPT

## 2020-12-21 PROCEDURE — 2580000003 HC RX 258: Performed by: INTERNAL MEDICINE

## 2020-12-21 PROCEDURE — 81001 URINALYSIS AUTO W/SCOPE: CPT

## 2020-12-21 PROCEDURE — 83605 ASSAY OF LACTIC ACID: CPT

## 2020-12-21 PROCEDURE — 93010 ELECTROCARDIOGRAM REPORT: CPT | Performed by: INTERNAL MEDICINE

## 2020-12-21 PROCEDURE — 84484 ASSAY OF TROPONIN QUANT: CPT

## 2020-12-21 PROCEDURE — 99222 1ST HOSP IP/OBS MODERATE 55: CPT | Performed by: SURGERY

## 2020-12-21 PROCEDURE — 2580000003 HC RX 258: Performed by: PHYSICIAN ASSISTANT

## 2020-12-21 PROCEDURE — U0002 COVID-19 LAB TEST NON-CDC: HCPCS

## 2020-12-21 PROCEDURE — 96368 THER/DIAG CONCURRENT INF: CPT

## 2020-12-21 PROCEDURE — 96376 TX/PRO/DX INJ SAME DRUG ADON: CPT

## 2020-12-21 PROCEDURE — 96375 TX/PRO/DX INJ NEW DRUG ADDON: CPT

## 2020-12-21 PROCEDURE — 96365 THER/PROPH/DIAG IV INF INIT: CPT

## 2020-12-21 PROCEDURE — 1200000000 HC SEMI PRIVATE

## 2020-12-21 RX ORDER — HEPARIN SODIUM 5000 [USP'U]/ML
5000 INJECTION, SOLUTION INTRAVENOUS; SUBCUTANEOUS EVERY 8 HOURS SCHEDULED
Status: DISCONTINUED | OUTPATIENT
Start: 2020-12-21 | End: 2020-12-27 | Stop reason: HOSPADM

## 2020-12-21 RX ORDER — ACETAMINOPHEN 650 MG/1
650 SUPPOSITORY RECTAL EVERY 6 HOURS PRN
Status: DISCONTINUED | OUTPATIENT
Start: 2020-12-21 | End: 2020-12-27 | Stop reason: HOSPADM

## 2020-12-21 RX ORDER — PROMETHAZINE HYDROCHLORIDE 25 MG/1
12.5 TABLET ORAL EVERY 6 HOURS PRN
Status: DISCONTINUED | OUTPATIENT
Start: 2020-12-21 | End: 2020-12-27 | Stop reason: HOSPADM

## 2020-12-21 RX ORDER — LOSARTAN POTASSIUM 25 MG/1
25 TABLET ORAL DAILY
Status: DISCONTINUED | OUTPATIENT
Start: 2020-12-21 | End: 2020-12-22

## 2020-12-21 RX ORDER — DEXTROSE MONOHYDRATE 50 MG/ML
100 INJECTION, SOLUTION INTRAVENOUS PRN
Status: DISCONTINUED | OUTPATIENT
Start: 2020-12-21 | End: 2020-12-27 | Stop reason: HOSPADM

## 2020-12-21 RX ORDER — PREGABALIN 75 MG/1
150 CAPSULE ORAL 2 TIMES DAILY
Status: DISCONTINUED | OUTPATIENT
Start: 2020-12-21 | End: 2020-12-27 | Stop reason: HOSPADM

## 2020-12-21 RX ORDER — CLOPIDOGREL BISULFATE 75 MG/1
75 TABLET ORAL DAILY
Status: DISCONTINUED | OUTPATIENT
Start: 2020-12-21 | End: 2020-12-27 | Stop reason: HOSPADM

## 2020-12-21 RX ORDER — MORPHINE SULFATE 2 MG/ML
2 INJECTION, SOLUTION INTRAMUSCULAR; INTRAVENOUS EVERY 4 HOURS PRN
Status: DISCONTINUED | OUTPATIENT
Start: 2020-12-21 | End: 2020-12-27 | Stop reason: HOSPADM

## 2020-12-21 RX ORDER — METOLAZONE 2.5 MG/1
2.5 TABLET ORAL 2 TIMES DAILY
Status: DISCONTINUED | OUTPATIENT
Start: 2020-12-21 | End: 2020-12-25

## 2020-12-21 RX ORDER — ALBUTEROL SULFATE 90 UG/1
2 AEROSOL, METERED RESPIRATORY (INHALATION) EVERY 4 HOURS PRN
Status: DISCONTINUED | OUTPATIENT
Start: 2020-12-21 | End: 2020-12-27 | Stop reason: HOSPADM

## 2020-12-21 RX ORDER — BUMETANIDE 0.25 MG/ML
1 INJECTION, SOLUTION INTRAMUSCULAR; INTRAVENOUS 2 TIMES DAILY
Status: DISCONTINUED | OUTPATIENT
Start: 2020-12-21 | End: 2020-12-27

## 2020-12-21 RX ORDER — ACETAMINOPHEN 325 MG/1
650 TABLET ORAL EVERY 6 HOURS PRN
Status: DISCONTINUED | OUTPATIENT
Start: 2020-12-21 | End: 2020-12-27 | Stop reason: HOSPADM

## 2020-12-21 RX ORDER — SODIUM CHLORIDE 0.9 % (FLUSH) 0.9 %
10 SYRINGE (ML) INJECTION EVERY 12 HOURS SCHEDULED
Status: DISCONTINUED | OUTPATIENT
Start: 2020-12-21 | End: 2020-12-27 | Stop reason: HOSPADM

## 2020-12-21 RX ORDER — CARVEDILOL 3.12 MG/1
3.12 TABLET ORAL 2 TIMES DAILY
Status: DISCONTINUED | OUTPATIENT
Start: 2020-12-21 | End: 2020-12-27 | Stop reason: HOSPADM

## 2020-12-21 RX ORDER — ATORVASTATIN CALCIUM 40 MG/1
40 TABLET, FILM COATED ORAL NIGHTLY
Status: DISCONTINUED | OUTPATIENT
Start: 2020-12-21 | End: 2020-12-27 | Stop reason: HOSPADM

## 2020-12-21 RX ORDER — SODIUM CHLORIDE 0.9 % (FLUSH) 0.9 %
10 SYRINGE (ML) INJECTION PRN
Status: DISCONTINUED | OUTPATIENT
Start: 2020-12-21 | End: 2020-12-27 | Stop reason: HOSPADM

## 2020-12-21 RX ORDER — MORPHINE SULFATE 4 MG/ML
4 INJECTION, SOLUTION INTRAMUSCULAR; INTRAVENOUS
Status: DISCONTINUED | OUTPATIENT
Start: 2020-12-21 | End: 2020-12-21

## 2020-12-21 RX ORDER — DOXAZOSIN MESYLATE 4 MG/1
4 TABLET ORAL 2 TIMES DAILY
Status: DISCONTINUED | OUTPATIENT
Start: 2020-12-21 | End: 2020-12-27 | Stop reason: HOSPADM

## 2020-12-21 RX ORDER — POLYETHYLENE GLYCOL 3350 17 G/17G
17 POWDER, FOR SOLUTION ORAL DAILY PRN
Status: DISCONTINUED | OUTPATIENT
Start: 2020-12-21 | End: 2020-12-27 | Stop reason: HOSPADM

## 2020-12-21 RX ORDER — DEXTROSE MONOHYDRATE 25 G/50ML
12.5 INJECTION, SOLUTION INTRAVENOUS PRN
Status: DISCONTINUED | OUTPATIENT
Start: 2020-12-21 | End: 2020-12-27 | Stop reason: HOSPADM

## 2020-12-21 RX ORDER — PREGABALIN 25 MG/1
150 CAPSULE ORAL ONCE
Status: COMPLETED | OUTPATIENT
Start: 2020-12-21 | End: 2020-12-21

## 2020-12-21 RX ORDER — NICOTINE POLACRILEX 4 MG
15 LOZENGE BUCCAL PRN
Status: DISCONTINUED | OUTPATIENT
Start: 2020-12-21 | End: 2020-12-27 | Stop reason: HOSPADM

## 2020-12-21 RX ORDER — ONDANSETRON 2 MG/ML
4 INJECTION INTRAMUSCULAR; INTRAVENOUS EVERY 6 HOURS PRN
Status: DISCONTINUED | OUTPATIENT
Start: 2020-12-21 | End: 2020-12-27 | Stop reason: HOSPADM

## 2020-12-21 RX ADMIN — CEFTRIAXONE SODIUM 2 G: 2 INJECTION, POWDER, FOR SOLUTION INTRAMUSCULAR; INTRAVENOUS at 02:03

## 2020-12-21 RX ADMIN — METOLAZONE 2.5 MG: 2.5 TABLET ORAL at 21:10

## 2020-12-21 RX ADMIN — CLOPIDOGREL BISULFATE 75 MG: 75 TABLET ORAL at 12:26

## 2020-12-21 RX ADMIN — PREGABALIN 150 MG: 25 CAPSULE ORAL at 12:26

## 2020-12-21 RX ADMIN — ATORVASTATIN CALCIUM 40 MG: 40 TABLET, FILM COATED ORAL at 20:07

## 2020-12-21 RX ADMIN — HEPARIN SODIUM 5000 UNITS: 5000 INJECTION INTRAVENOUS; SUBCUTANEOUS at 20:05

## 2020-12-21 RX ADMIN — VANCOMYCIN HYDROCHLORIDE 2000 MG: 1 INJECTION, POWDER, LYOPHILIZED, FOR SOLUTION INTRAVENOUS at 04:02

## 2020-12-21 RX ADMIN — MORPHINE SULFATE 4 MG: 4 INJECTION, SOLUTION INTRAMUSCULAR; INTRAVENOUS at 04:02

## 2020-12-21 RX ADMIN — MORPHINE SULFATE 4 MG: 4 INJECTION, SOLUTION INTRAMUSCULAR; INTRAVENOUS at 01:03

## 2020-12-21 RX ADMIN — PREGABALIN 150 MG: 75 CAPSULE ORAL at 20:06

## 2020-12-21 RX ADMIN — BUMETANIDE 1 MG: 0.25 INJECTION INTRAMUSCULAR; INTRAVENOUS at 12:12

## 2020-12-21 RX ADMIN — MORPHINE SULFATE 2 MG: 2 INJECTION, SOLUTION INTRAMUSCULAR; INTRAVENOUS at 16:43

## 2020-12-21 RX ADMIN — DOXAZOSIN 4 MG: 4 TABLET ORAL at 20:07

## 2020-12-21 RX ADMIN — DOXAZOSIN 4 MG: 4 TABLET ORAL at 12:11

## 2020-12-21 RX ADMIN — BUMETANIDE 1 MG: 0.25 INJECTION INTRAMUSCULAR; INTRAVENOUS at 20:07

## 2020-12-21 RX ADMIN — SODIUM CHLORIDE, PRESERVATIVE FREE 10 ML: 5 INJECTION INTRAVENOUS at 13:20

## 2020-12-21 RX ADMIN — HEPARIN SODIUM 5000 UNITS: 5000 INJECTION INTRAVENOUS; SUBCUTANEOUS at 15:47

## 2020-12-21 RX ADMIN — CARVEDILOL 3.12 MG: 3.12 TABLET, FILM COATED ORAL at 12:08

## 2020-12-21 RX ADMIN — CARVEDILOL 3.12 MG: 3.12 TABLET, FILM COATED ORAL at 20:07

## 2020-12-21 RX ADMIN — METOLAZONE 2.5 MG: 2.5 TABLET ORAL at 21:32

## 2020-12-21 RX ADMIN — LOSARTAN POTASSIUM 25 MG: 25 TABLET, FILM COATED ORAL at 12:26

## 2020-12-21 RX ADMIN — FUROSEMIDE 40 MG: 10 INJECTION, SOLUTION INTRAMUSCULAR; INTRAVENOUS at 00:20

## 2020-12-21 RX ADMIN — SODIUM CHLORIDE, PRESERVATIVE FREE 10 ML: 5 INJECTION INTRAVENOUS at 20:06

## 2020-12-21 ASSESSMENT — PAIN DESCRIPTION - LOCATION: LOCATION: GROIN

## 2020-12-21 ASSESSMENT — PAIN DESCRIPTION - FREQUENCY: FREQUENCY: CONTINUOUS

## 2020-12-21 ASSESSMENT — PAIN SCALES - GENERAL
PAINLEVEL_OUTOF10: 8
PAINLEVEL_OUTOF10: 7
PAINLEVEL_OUTOF10: 10

## 2020-12-21 ASSESSMENT — PAIN DESCRIPTION - ONSET: ONSET: ON-GOING

## 2020-12-21 NOTE — ED NOTES
Bed: 03TR-03  Expected date:   Expected time:   Means of arrival:   Comments:  Medic-Short of breath     Moy Skinner RN  12/20/20 1147

## 2020-12-21 NOTE — ED NOTES
Reported off to [facility]. Denies any questions.       Joaquim Herrmann RN  12/21/20 [id number]

## 2020-12-21 NOTE — ED PROVIDER NOTES
Emergency [address] EMERGENCY DEPARTMENT    Patient: Cooper Chino  MRN: [id number]  : 1950  Date of Evaluation: 2020  ED Provider: Ricky Garcia PA-C    Chief Complaint       Chief Complaint   Patient presents with    Shortness of Breath     Worsening tonight, worse with movement    Groin Swelling     x2wks    Wound Infection     Left foot amputation per Dr. Brunilda Piña. Ang Boss is a 79 y.o. male who presents to the emergency department for left lower extremity pain, scrotal edema, and shortness of breath. Patient states he has had diabetic ulcers to the left foot for 3-4 months, acutely worsening over the last couple of days. He states he saw Dr. Brunilda Piña a few days ago and was told he needed the foot amputated. He states there is now increased pain and foul-smelling drainage. He denies any fever. Scrotal swelling began about 2 weeks ago. Constant, worsening. He started feeling short of breath just tonight. Denies chest pain. Denies cough or congestion. No known sick contacts. He is on Torsemide and reports compliance. ROS     CONSTITUTIONAL:  Denies fever. EYES:  Denies visual changes. HEAD:  Denies headache. ENT:  Denies earache, nasal congestion, sore throat. NECK:  Denies neck pain. RESPIRATORY:  + shortness of breath. CARDIOVASCULAR:  Denies chest pain. GI:  Denies nausea or vomiting. :  + scrotal edema. MUSCULOSKELETAL:  Denies extremity pain or swelling. BACK:  Denies back pain. INTEGUMENT:  + foot ulcer. LYMPHATIC:  Denies lymphadenopathy. NEUROLOGIC:  Denies any numbness/tingling. PSYCHIATRIC:  Denies SI/HI.     Past History     Past Medical History:   Diagnosis Date    Acid reflux     Acute MI ([facility] [address] ,     Arthritis     Back    Broken teeth     Upper Front    CAD (coronary artery disease)     Sees Dr. Bharti [facility])     per old chart    CHF (congestive heart failure) (HCC)     Chronic back pain     Chronic kidney disease     STAGE 3 KIDNEY FAILURE- \"from my diabetes- do not follow with any one- have seen Dr Jos Burgess in the past\"    Diabetes mellitus ([facility] [address] Dx 1965    per old chart pt has been diabetic since age 13    Diabetic neuropathy ([facility] [address]     \"in my feet\"    H/O cardiovascular stress test 08/25/2016    H/O Doppler ultrasound 09/27/2018    Moderate disease of the right lower extremity with an JALEN of 0.72.   Moderate to severe disease of the left lower extremity with an JALEN of 0.55.   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    Neuropathy     both feet    neuropathy     PAD (peripheral artery disease) ([facility] [address] 09/27/2018    PVD (peripheral vascular disease) ([facility] [address]     Sick sinus syndrome ([address]     Sleep apnea     \"sleep study 3 yrs ago- could not tolerate the cpap made me too dry\"    Spinal stenosis     Teeth missing     Upper And Lower    Type 2 diabetes mellitus without complication [facility])      Past Surgical History:   Procedure Laterality Date    CARDIAC CATHETERIZATION      per old chart done 10/2014    CARDIAC CATHETERIZATION  07/14/2017    with angiography of leg    CARDIAC CATHETERIZATION  11/20/2018    PATENT STENTS OF ALL THREE MAJOR VESSELS    CARDIAC DEFIBRILLATOR PLACEMENT  06/04/2010    [employer] [address] Defibrillator Implanted    COLECTOMY Right 08/26/2016    laparascopic; robotic assisted    COLONOSCOPY  08/04/2016    CORONARY ANGIOPLASTY      \"15 Heart Stents\"    CORONARY ANGIOPLASTY WITH STENT PLACEMENT      per old chart had angio with stent to circumflex and obtuse marginal artery at [facility] 5/2010( old chart also gives hx of stent placement done 2000,2004 and 2005)    DENTAL SURGERY      Teeth Extracted In Past    PACEMAKER PLACEMENT  06/04/2010    [employer] [address] Defibrillator Implanted    TOE AMPUTATION Right 09/12/2017    Rt 3rd toe    TOE AMPUTATION Right 01/09/2018     Right 5th toe amputation and Toenails trimmed left 2,3,4 and 5th toes    VASCULAR SURGERY      per old chart had balloon angioplasty right superfical femoral artery,right popliteal artery,,right ant.tibial artery, right tibioperoneal trunk, and right post.tibial artery wna stent placement right popliteal artery and superfical femoral artery 7/2012     Social History     Socioeconomic History    Marital status:       Spouse name: None    Number of children: None    Years of education: None    Highest education level: None   Occupational History    None   Social Needs    Financial resource strain: None    Food insecurity     Worry: None     Inability: None    Transportation needs     Medical: None     Non-medical: None   Tobacco Use    Smoking status: Former Smoker     Packs/day: 0.00     Years: 36.00     Pack years: 0.00     Types: Cigars     Start date: 1/1/1980    Smokeless tobacco: Never Used   Substance and Sexual Activity    Alcohol use: No     Frequency: Never    Drug use: Yes     Types: Marijuana    Sexual activity: Never   Lifestyle    Physical activity     Days per week: None     Minutes per session: None    Stress: None   Relationships    Social connections     Talks on phone: None     Gets together: None     Attends [employer] service: None     Active member of club or organization: None     Attends meetings of clubs or organizations: None     Relationship status: None    Intimate partner violence     Fear of current or ex partner: None     Emotionally abused: None     Physically abused: None     Forced sexual activity: None   Other Topics on nasal canula oxygen. ABDOMEN:  Soft, non-distended, non-tender. BS active. :  + edema of the scrotum and penis. EXTREMITIES:  No acute deformities. Ulcer to the left great toe with yellow-green malodorous drainage, as well as another small superficial ulcer to the plantar aspect of the foot. There appears to be edema of the bilateral LE, although no significant pitting. Erythema and warmth to the left lower leg. SKIN:  Warm and dry. NEUROLOGICAL:  Alert and oriented. PSYCHIATRIC:  Normal mood.      Diagnostics     Labs:  Results for orders placed or performed during the hospital encounter of 12/20/20   CBC Auto Differential   Result Value Ref Range    WBC 10.0 4.0 - 10.5 K/CU MM    RBC 3.75 (L) 4.6 - 6.2 M/CU MM    Hemoglobin 10.9 (L) 13.5 - 18.0 GM/DL    Hematocrit 36.4 (L) 42 - 52 %    MCV 97.1 78 - 100 FL    MCH 29.1 27 - 31 PG    MCHC 29.9 (L) 32.0 - 36.0 %    RDW 14.3 11.7 - 14.9 %    Platelets 423 177 - 845 K/CU MM    MPV 10.5 7.5 - 11.1 FL    Differential Type AUTOMATED DIFFERENTIAL     Segs Relative 80.3 (H) 36 - 66 %    Lymphocytes % 12.0 (L) 24 - 44 %    Monocytes % 5.9 (H) 0 - 4 %    Eosinophils % 1.2 0 - 3 %    Basophils % 0.3 0 - 1 %    Segs Absolute 8.0 K/CU MM    Lymphocytes Absolute 1.2 K/CU MM    Monocytes Absolute 0.6 K/CU MM    Eosinophils Absolute 0.1 K/CU MM    Basophils Absolute 0.0 K/CU MM    Nucleated RBC % 0.0 %    Total Nucleated RBC 0.0 K/CU MM    Total Immature Neutrophil 0.03 K/CU MM    Immature Neutrophil % 0.3 0 - 0.43 %   Comprehensive Metabolic Panel w/ Reflex to MG   Result Value Ref Range    Sodium 137 135 - 145 MMOL/L    Potassium 4.5 3.5 - 5.1 MMOL/L    Chloride 103 99 - 110 mMol/L    CO2 28 21 - 32 MMOL/L    BUN 14 6 - 23 MG/DL    CREATININE 1.6 (H) 0.9 - 1.3 MG/DL    Glucose 201 (H) 70 - 99 MG/DL    Calcium 8.3 8.3 - 10.6 MG/DL    Alb 3.3 (L) 3.4 - 5.0 GM/DL    Total Protein 7.2 6.4 - 8.2 GM/DL    Total Bilirubin 0.4 0.0 - 1.0 MG/DL    ALT 6 (L) 10 - 40 U/L AST 11 (L) 15 - 37 IU/L    Alkaline Phosphatase 73 40 - 129 IU/L    GFR Non- 43 (L) >60 mL/min/1.73m2    GFR  52 (L) >60 mL/min/1.73m2    Anion Gap 6 4 - 16   Troponin   Result Value Ref Range    Troponin T 0.024 (H) <0.01 NG/ML   Brain Natriuretic Peptide   Result Value Ref Range    Pro-BNP 3,684 (H) <300 PG/ML   Lactic Acid, Plasma   Result Value Ref Range    Lactate 1.3 0.4 - 2.0 mMOL/L   EKG 12 Lead   Result Value Ref Range    Ventricular Rate 77 BPM    Atrial Rate 77 BPM    P-R Interval 158 ms    QRS Duration 96 ms    Q-T Interval 424 ms    QTc Calculation (Bazett) 479 ms    P Axis 80 degrees    R Axis 43 degrees    T Axis 26 degrees    Diagnosis       Normal sinus rhythm  Normal ECG  When compared with ECG of 11-DEC-2020 08:56,  Sinus rhythm has replaced Electronic atrial pacemaker  Nonspecific T wave abnormality no longer evident in Anterior leads         Radiographs:  Xr Foot Left (min 3 Views)    Result Date: 12/21/2020  EXAMINATION: THREE XRAY VIEWS OF THE LEFT FOOT 12/21/2020 12:00 am COMPARISON: None. HISTORY: ORDERING SYSTEM PROVIDED HISTORY: ulcers, worsening pain TECHNOLOGIST PROVIDED HISTORY: Reason for exam:->ulcers, worsening pain Reason for Exam: ulcers, worsening pain Acuity: Acute Type of Exam: Initial Mechanism of Injury: ulcers, worsening pain Relevant Medical/Surgical History: ulcers, worsening pain FINDINGS: Osteopenia. Soft tissue swelling of the 1st digit. An ulcer is present at the distal 1st digit, which extends to the level of the distal phalanx. No acute fracture. Mild degenerative changes at the interphalangeal joints. The Lisfranc joint is intact. No erosive changes are seen. Large ulcer involving the distal 1st digit, which extends to the level of the tip of the 1st distal phalanx. There is associated soft tissue swelling, most likely related to cellulitis.      Xr Chest Portable    Result Date: 12/21/2020  EXAMINATION: ONE XRAY VIEW OF THE CHEST 12/21/2020 12:00 am COMPARISON: 12/09/2020 HISTORY: ORDERING SYSTEM PROVIDED HISTORY: sob TECHNOLOGIST PROVIDED HISTORY: Reason for exam:->sob Reason for Exam: sob Acuity: Acute Type of Exam: Initial Mechanism of Injury: sob Relevant Medical/Surgical History: sob FINDINGS: Pacemaker wires. Borderline cardiomegaly. The pulmonary vasculature appears congested. No focal consolidations. No pleural effusion or pneumothorax. Borderline cardiomegaly with pulmonary vascular congestion. Procedures/EKG:   Please see Dr. John Hilliard note for interpretation. ED Course and MDM   -  Patient seen and evaluated in the emergency department. -  Triage and nursing notes reviewed and incorporated. -  Old chart records reviewed and incorporated. -  Supervising physician was Dr. Mert Carpenter. Patient was seen independently. -  Work-up included:  See above  -  ED medications:  Lasix, Rocephin, Vancomycin, morphine  -  Results discussed with patient. No leukocytosis. Renal function is improved from previous. Chronically elevated troponin. BNP 3600. CXR shows cardiomegaly and pulmonary vascular congestion. XR foot shows large ulcer and soft tissue swelling. Covered with ABX for cellulitis/infection of ulcer and IV Lasix for his CHF. I spoke with Dr. Gopal Duarte, hospitalist, who will see and admit for further management. CRITICAL CARE NOTE:  There was a high probability of clinically significant life-threatening deterioration of the patient's condition requiring my urgent intervention due to hypoxia/CHF, cellulitis. Telemetry monitoring, review of labs and imaging, initiation of oxygen therapy, IV ABX, consult hospitalist was performed to address this. Total critical care time is at least  35 minutes.     This includes vital sign monitoring, pulse oximetry monitoring, telemetry monitoring, clinical response to the IV medications, reviewing the nursing notes, consultation time, dictation/documentation time, and interpretation of the lab work. This time excludes time spent performing procedures and separately billable procedures and family discussion time. In light of current events, I did utilize appropriate PPE (including N95 and surgical face mask, safety glasses, and gloves, as recommended by the health facility/national standard best practice, during my bedside interactions with the patient. Final Impression      1. Acute on chronic congestive heart failure, unspecified heart failure type (HCC)    2. Scrotal edema    3. Hypoxia    4. Diabetic ulcer of toe of left foot associated with type 2 diabetes mellitus, unspecified ulcer stage ([facility] [address]    5.  Cellulitis of left lower extremity            DISPOSITION        Ellyn Quigley, [address], [place]  12/21/20 2095

## 2020-12-21 NOTE — ED NOTES
[id number] paged Dr Marah Grove with in patient consult     [facility]  12/21/20 0461

## 2020-12-21 NOTE — CONSULTS
CARDIOLOGY CONSULT NOTE   Reason for consultation:  SOB/ non healing left foot great toe ulcer     Referring physician:  Dudley Patel MD     Primary care physician: Kimani Jim      Dear  Dr. Dudley Patel MD   Thanks for the consult. Chief Complaints :  Chief Complaint   Patient presents with    Shortness of Breath     Worsening tonight, worse with movement    Groin Swelling     x2wks    Wound Infection     Left foot amputation per Dr. Isai Vaughan. History of present illness:Anmol is a 79 y. o.year old who presents with complaint of generalized swelling lower extremity swelling bilaterally and scrotal swelling ongoing for several days he was actually admitted in [facility] 2 weeks ago and was diuresed with advised to follow-up as outpatient he says after going home he has been having turkey  breasts sandwiches and canned tuna sandwich. Swelling has gotten worse left foot appears worse was evaluated by Dr. Santo Tobias advised to come in for possible amputation he does have severe peripheral vascular disease on arterial Doppler he has had peripheral interventions before. He is history of congestive heart failure and coronary artery disease. EF is in 40 to 45% range and a stress test few months ago in August 2020 showed no ischemia   He is a complicated medical history including history of coronary disease, multiple stents. He is to see Dr. Alanna Sands and then cardiology at [facility]. He had his ICD device placed for cardiomyopathy in 2010. Since 2015. He is being seen at [facility] . Dr. Eric Jerez. Echo shows EF of 50%. . Previous records indicated that he is to have A. fib. He was on aspirin and Plavix, and multiple stents in the past.  Most recent ICD interrogation does not show any A. fib burden. He describes chest pressure intermittently.   He is currently on Plavix  Past medical history:    has a past medical history of Acid reflux, Acute MI ([address] Arthritis, Broken teeth, CAD mg, Q6H PRN    Or      acetaminophen (TYLENOL) suppository 650 mg, Q6H PRN      bumetanide (BUMEX) injection 1 mg, BID      losartan (COZAAR) tablet 25 mg, Daily      insulin lispro (HUMALOG) injection vial 0-12 Units, TID WC      insulin lispro (HUMALOG) injection vial 0-6 Units, Nightly      glucose (GLUTOSE) 40 % oral gel 15 g, PRN      dextrose 50 % IV solution, PRN      glucagon (rDNA) injection 1 mg, PRN      dextrose 5 % solution, PRN      albuterol sulfate  (90 Base) MCG/ACT inhaler 2 puff, Q4H PRN      atorvastatin (LIPITOR) tablet 40 mg, Nightly      carvedilol (COREG) tablet 3.125 mg, BID      clopidogrel (PLAVIX) tablet 75 mg, Daily      doxazosin (CARDURA) tablet 4 mg, BID      pregabalin (LYRICA) capsule 150 mg, BID      heparin (porcine) injection 5,000 Units, 3 times per day      metOLazone (ZAROXOLYN) tablet 2.5 mg, BID      Current Facility-Administered Medications   Medication Dose Route Frequency Provider Last Rate Last Admin    morphine sulfate (PF) injection 4 mg  4 mg Intravenous Q1H PRN Tyna Schlatter, PA-C   4 mg at 12/21/20 0402    sodium chloride flush 0.9 % injection 10 mL  10 mL Intravenous 2 times per day Boom Oneill MD   10 mL at 12/21/20 1320    sodium chloride flush 0.9 % injection 10 mL  10 mL Intravenous PRN Boom Oneill MD        promethazine (PHENERGAN) tablet 12.5 mg  12.5 mg Oral Q6H PRN Boom Oneill MD        Or    ondansetron (ZOFRAN) injection 4 mg  4 mg Intravenous Q6H PRN Boom Oneill MD        polyethylene glycol (GLYCOLAX) packet 17 g  17 g Oral Daily PRN Boom Oneill MD        acetaminophen (TYLENOL) tablet 650 mg  650 mg Oral Q6H PRN Boom Oneill MD        Or    acetaminophen (TYLENOL) suppository 650 mg  650 mg Rectal Q6H PRN Boom Oneill MD        bumetanide (BUMEX) injection 1 mg  1 mg Intravenous BID Boom Oneill MD   1 mg at 12/21/20 1212    losartan (COZAAR) tablet 25 mg  25 mg Oral Daily Inna Laura MD   25 mg at 12/21/20 1226    insulin lispro (HUMALOG) injection vial 0-12 Units  0-12 Units Subcutaneous TID WC Inna Laura MD        insulin lispro (HUMALOG) injection vial 0-6 Units  0-6 Units Subcutaneous Nightly Inna Laura MD        glucose (GLUTOSE) 40 % oral gel 15 g  15 g Oral PRN Inna Laura MD        dextrose 50 % IV solution  12.5 g Intravenous PRN Inna Laura MD        glucagon (rDNA) injection 1 mg  1 mg Intramuscular PRN Inna Laura MD        dextrose 5 % solution  100 mL/hr Intravenous PRN Inna Laura MD        albuterol sulfate  (90 Base) MCG/ACT inhaler 2 puff  2 puff Inhalation Q4H PRN Inna Laura MD        atorvastatin (LIPITOR) tablet 40 mg  40 mg Oral Nightly Inna Laura MD        carvedilol (COREG) tablet 3.125 mg  3.125 mg Oral BID Inna Laura MD   3.125 mg at 12/21/20 1208    clopidogrel (PLAVIX) tablet 75 mg  75 mg Oral Daily Inna Laura MD   75 mg at 12/21/20 1226    doxazosin (CARDURA) tablet 4 mg  4 mg Oral BID Inna Laura MD   4 mg at 12/21/20 1211    pregabalin (LYRICA) capsule 150 mg  150 mg Oral BID Inna Laura MD        heparin (porcine) injection 5,000 Units  5,000 Units Subcutaneous 3 times per day Inna Laura MD   5,000 Units at 12/21/20 1547    metOLazone (ZAROXOLYN) tablet 2.5 mg  2.5 mg Oral BID Paty Bonds MD         Current Outpatient Medications   Medication Sig Dispense Refill    pregabalin (LYRICA) 150 MG capsule TAKE 1 CAPSULE BY MOUTH THREE TIMES A DAY      doxazosin (CARDURA) 2 MG tablet Take 2 tablets by mouth 2 times daily 90 tablet 3    torsemide (DEMADEX) 100 MG tablet TAKE 1 TABLET BY MOUTH TWICE A DAY 60 tablet 3    insulin lispro (HUMALOG) 100 UNIT/ML injection vial Inject 0-6 Units into the skin 3 times daily (with meals) 1 vial 3    atorvastatin (LIPITOR) 40 MG tablet Take 1 tablet by mouth nightly 30 tablet 3    losartan (COZAAR) 25 MG tablet Take 1 tablet by mouth daily 30 tablet 3    carvedilol (COREG) 3.125 MG tablet Take 1 tablet by mouth 2 times daily 60 tablet 3    dicyclomine (BENTYL) 10 MG capsule Take 1 capsule by mouth 4 times daily (before meals and nightly) 120 capsule 3    metOLazone (ZAROXOLYN) 5 MG tablet Take 5 mg by mouth daily      albuterol sulfate HFA (VENTOLIN HFA) 108 (90 Base) MCG/ACT inhaler Inhale 2 puffs into the lungs every 4 hours as needed for Wheezing 1 Inhaler 3    clopidogrel (PLAVIX) 75 MG tablet Take 1 tablet by mouth daily 30 tablet 11    acetaminophen (TYLENOL) 325 MG tablet Take 2 tablets by mouth every 6 hours as needed for Pain 120 tablet 3    Calcium Alginate (ALGICELL CALCIUM DRESSING 4\"X8) MISC Apply 1 Device topically daily 30 each 3    PROMOGRAN COREY WOUND DRESSING MATRIX Apply topically Apply to left daily and cover with gauze 1 Package 3     Review of Systems:   · Constitutional: No Fever or Weight Loss   · Eyes: No Decreased Vision  · ENT: No Headaches, Hearing Loss or Vertigo  · Cardiovascular: As per HPI  · Respiratory: As per HPI  · Gastrointestinal: No abdominal pain, appetite loss, blood in stools, constipation, diarrhea or heartburn  · Genitourinary: No dysuria, trouble voiding, or hematuria  · Musculoskeletal:  No gait disturbance, weakness or joint complaints  · Integumentary: No rash or pruritis  · Neurological: No TIA or stroke symptoms  · Psychiatric: No anxiety or depression  · Endocrine: No malaise, fatigue or temperature intolerance  · Hematologic/Lymphatic: No bleeding problems, blood clots or swollen lymph nodes  · Allergic/Immunologic: No nasal congestion or hives  All systems negative except as marked.         Physical Examination:    Vitals:    12/21/20 0615 12/21/20 0630 12/21/20 0645 12/21/20 1208   BP:    (!) 162/63   Pulse: 68 62 63 58   Resp: 16 10 10    Temp:       TempSrc:       SpO2: Weight:       Height:           General Appearance:  No distress, conversant    Constitutional:  Well developed, Well nourished, No acute distress, Non-toxic appearance. HENT:  Normocephalic, Atraumatic, Bilateral external ears normal, Oropharynx moist, No oral exudates, Nose normal. Neck- Normal range of motion, No tenderness, Supple, No stridor,no apical-carotid delay  Lymphatics : no palpable lymph nodes  Eyes:  PERRL, EOMI, Conjunctiva normal, No discharge. Respiratory:  Normal breath sounds, No respiratory distress, No wheezing, No chest tenderness. ,no use of accessory muscles, crackles Present  Cardiovascular: (PMI) apex non displaced,no lifts no thrills, ankle swelling Present , 1+, s1 and s2 audible,Murmur. Present, JVD not noted    Abdomen /GI: Scrotal swelling bowel sounds normal, Soft, No tenderness, No masses, No gross visceromegaly   :  No costovertebral angle tenderness   Musculoskeletal: 2+o edema, no tenderness, left toe appears dry with open wound chronic gangrenous appearing.  Back- no tenderness  Integument:  Well hydrated, no rash   Lymphatic:  No lymphadenopathy noted   Neurologic:  Alert & oriented x 3, CN 2-12 normal, normal motor function, normal sensory function, no focal deficits noted           Medical decision making and Data review:    Lab Review   Recent Labs     12/21/20  0017   WBC 10.0   HGB 10.9*   HCT 36.4*         Recent Labs     12/21/20  0017      K 4.5      CO2 28   BUN 14   CREATININE 1.6*     Recent Labs     12/21/20  0017   AST 11*   ALT 6*   BILITOT 0.4   ALKPHOS 73     Recent Labs     12/21/20  0017   TROPONINT 0.024*       Recent Labs     12/21/20  0017   PROBNP 3,684*     Lab Results   Component Value Date    INR 1.03 11/19/2018    PROTIME 11.7 11/19/2018       EKG: (reviewed by myself)    ECHO:(reviewed by myself)    Chest Xray:(reviewed by myself)  Xr Foot Left (min 3 Views)    Result Date: 12/21/2020  EXAMINATION: THREE XRAY VIEWS OF THE LEFT FOOT 12/21/2020 12:00 am COMPARISON: None. HISTORY: ORDERING SYSTEM PROVIDED HISTORY: ulcers, worsening pain TECHNOLOGIST PROVIDED HISTORY: Reason for exam:->ulcers, worsening pain Reason for Exam: ulcers, worsening pain Acuity: Acute Type of Exam: Initial Mechanism of Injury: ulcers, worsening pain Relevant Medical/Surgical History: ulcers, worsening pain FINDINGS: Osteopenia. Soft tissue swelling of the 1st digit. An ulcer is present at the distal 1st digit, which extends to the level of the distal phalanx. No acute fracture. Mild degenerative changes at the interphalangeal joints. The Lisfranc joint is intact. No erosive changes are seen. Large ulcer involving the distal 1st digit, which extends to the level of the tip of the 1st distal phalanx. There is associated soft tissue swelling, most likely related to cellulitis. Xr Foot Left (min 3 Views)    Result Date: 12/9/2020  EXAMINATION: THREE XRAY VIEWS OF THE LEFT FOOT 12/9/2020 9:07 pm COMPARISON: None. HISTORY: ORDERING SYSTEM PROVIDED HISTORY: left toe pain; please evaluate for signs of osteomyelitis in first toe TECHNOLOGIST PROVIDED HISTORY: Reason for exam:->left toe pain; please evaluate for signs of osteomyelitis in first toe Reason for Exam: left toe pain; please evaluate for signs of osteomyelitis in first toe Acuity: Acute Type of Exam: Initial FINDINGS: There is diffuse osseous demineralization. There may be a fracture at the distal tip of the 1st distal phalanx. There is diffuse soft tissue swelling. Is diffuse degenerative joint disease. Nondisplaced fracture of the distal 1st phalanx. Diffuse soft tissue swelling.      Xr Chest Portable    Result Date: 12/21/2020  EXAMINATION: ONE XRAY VIEW OF THE CHEST 12/21/2020 12:00 am COMPARISON: 12/09/2020 HISTORY: ORDERING SYSTEM PROVIDED HISTORY: sob TECHNOLOGIST PROVIDED HISTORY: Reason for exam:->sob Reason for Exam: sob Acuity: Acute Type of Exam: Initial Mechanism of Injury: sob Relevant Medical/Surgical History: sob FINDINGS: Pacemaker wires. Borderline cardiomegaly. The pulmonary vasculature appears congested. No focal consolidations. No pleural effusion or pneumothorax. Borderline cardiomegaly with pulmonary vascular congestion. Xr Chest Portable    Result Date: 12/9/2020  EXAMINATION: ONE XRAY VIEW OF THE CHEST 12/9/2020 9:07 pm COMPARISON: Chest radiograph performed 10/28/2020. HISTORY: ORDERING SYSTEM PROVIDED HISTORY: CHF TECHNOLOGIST PROVIDED HISTORY: Reason for exam:->CHF Reason for Exam: left toe pain; please evaluate for signs of osteomyelitis in first toe Acuity: Acute Type of Exam: Initial FINDINGS: The lungs are without consolidation or effusion. There is no pneumothorax. The heart is enlarged with stable cardiac leads. The upper abdomen is unremarkable. The extrathoracic soft tissues are unremarkable. Cardiomegaly without acute pulmonary process. Vl Dup Lower Extremity Arteries Bilateral    Result Date: 12/12/2020  EXAMINATION: ARTERIAL DUPLEX ULTRASOUND OF THE BILATERAL LOWER EXTREMITIES  12/11/2020 2:36 pm TECHNIQUE: Duplex ultrasound and Doppler images were obtained of the bilateral lower extremities COMPARISON: None HISTORY: ORDERING SYSTEM PROVIDED HISTORY: pvd TECHNOLOGIST PROVIDED HISTORY: Reason for exam:->pvd Reason for Exam: swelling FINDINGS: Right: Flow velocities were measured as follows: Com. Fem. 88 cm/sec Prof.            103 cm/sec SFA Prox. 47 cm/sec SFA Mid. 100 cm/sec SFA Dist.      73 cm/sec Pop.             32 cm/sec PTA             62, 57, 42 cm/sec Peron. Not visualized TE             41, 40, 73 cm/sec Dampened waveforms are present. Left: Flow velocities were measured as follows: Com. Fem. 81 cm/sec Prof.            Not visualized SFA Prox. 43 cm/sec SFA Mid.      38 cm/sec SFA Dist.      41 cm/sec Pop.             42 cm/sec PTA             46, 37, 43 cm/sec Peron. 37 cm/sec proximally.   Mid to distal artery are not visualized. TE             Not visualized. Dampened waveforms are present. 1. Nonvisualization of the left profunda, left TE, left mid to distal peroneal artery, and right peroneal artery. 2. Dampened waveforms are seen throughout the bilateral lower extremities. 3. Suggestive of high-grade stenosis involving the right distal superficial femoral artery and proximal popliteal artery. All labs, medications and tests reviewed by myself including data  from outside source , patient and available family . Continue all other medications of all above medical condition listed as is. Impression:  Active Problems:    Chronic coronary artery disease    Automatic implantable cardioverter-defibrillator in situ    Chronic kidney disease, stage III (moderate)    PVD (peripheral vascular disease) (HCC)    Acute on chronic systolic CHF (congestive heart failure) ([address]  Resolved Problems:    * No resolved hospital problems. *      Assessment: 79 y. o.year old with PMH of  has a past medical history of Acid reflux, Acute MI ([address] Arthritis, Broken teeth, CAD (coronary artery disease), Cardiomyopathy ([address] CHF (congestive heart failure) ([address] Chronic back pain, Chronic kidney disease, Diabetes mellitus ([address] Diabetic neuropathy ([address] H/O cardiovascular stress test, H/O Doppler ultrasound, H/O percutaneous left heart catheterization, History of irregular heartbeat, History of syncope, Hyperlipidemia, Hypertension, Leg swelling, Necrotic toes (HCC), Neuropathy, neuropathy, PAD (peripheral artery disease) ([address] PVD (peripheral vascular disease) ([address] Sick sinus syndrome ([address] Sleep apnea, Spinal stenosis, Teeth missing, and Type 2 diabetes mellitus without complication ([address] Plan and Recommendations:    Decompensated heart failure due to noncompliance with salt restriction  CHF: Acute Systolic/ Diastolic decompensated heart failure. Appears to be volume overloaded .  Agree with diuresis. Start metolazone and Bumex twice daily. Depending on response we can titrate diuretics accordingly. Strict Is and Os and Daily weights. chf nurse consult  HTN: stable, continue present medications   Nonhealing left toe ulcer wound debridement and amputation as per surgery  In setting of renal failure we will plan peripheral angiogram once more stable  History of coronary artery disease stress test in August 2020 showed no ischemia  DVT prophylaxis if no contraindication  6. Dyslipidemia: continue statins           Thank you  much for consult and giving us the opportunity in contributing in the care of this patient. Please feel free to call me for any questions.        Freddy White MD, 12/21/2020 3:50 PM

## 2020-12-21 NOTE — ED TRIAGE NOTES
Pt from home. States SOB worsening tonight, Scrotal swelling x2wks worsening. Back pain chronic. Left foot pain, supposed to see Andom for amputation.  A&Ox4

## 2020-12-21 NOTE — PROGRESS NOTES
Medication History  [facility]    Patient Name: John Rod 1950     Medication history has been completed by: Reyes Coil, CPhT    Source(s) of information: patient and insurance claims     Primary Care Physician: [id number]: [facility]    Allergies as of 12/20/2020 - Review Complete 12/20/2020   Allergen Reaction Noted    Pcn [penicillins] Hives     Fentanyl Itching 07/06/2017        Prior to Admission medications    Medication Sig Start Date End Date Taking?  Authorizing Provider   pregabalin (LYRICA) 150 MG capsule TAKE 1 CAPSULE BY MOUTH THREE TIMES A DAY 11/23/20  Yes Historical Provider, MD   doxazosin (CARDURA) 2 MG tablet Take 2 tablets by mouth 2 times daily 12/16/20  Yes Sawyer Hathaway MD   torsemide (DEMADEX) 100 MG tablet TAKE 1 TABLET BY MOUTH TWICE A DAY 12/14/20  Yes Sawyer Hathaway MD   insulin lispro (HUMALOG) 100 UNIT/ML injection vial Inject 0-6 Units into the skin 3 times daily (with meals) 12/13/20  Yes Josselin Grace MD   atorvastatin (LIPITOR) 40 MG tablet Take 1 tablet by mouth nightly 12/13/20  Yes Josselin Grace MD   losartan (COZAAR) 25 MG tablet Take 1 tablet by mouth daily 12/14/20  Yes Josselin Grace MD   carvedilol (COREG) 3.125 MG tablet Take 1 tablet by mouth 2 times daily 12/13/20  Yes Josselin Grace MD   dicyclomine (BENTYL) 10 MG capsule Take 1 capsule by mouth 4 times daily (before meals and nightly) 12/13/20  Yes Josselin Grace MD   metOLazone (ZAROXOLYN) 5 MG tablet Take 5 mg by mouth daily   Yes Historical Provider, MD   albuterol sulfate HFA (VENTOLIN HFA) 108 (90 Base) MCG/ACT inhaler Inhale 2 puffs into the lungs every 4 hours as needed for Wheezing 8/14/20  Yes Maria C Churchill MD   clopidogrel (PLAVIX) 75 MG tablet Take 1 tablet by mouth daily 7/18/17  Yes Milad Stacy MD   acetaminophen (TYLENOL) 325 MG tablet Take 2 tablets by mouth every 6 hours as needed for Pain 10/28/20   Vicente Reese Gregorio,    Calcium Alginate (ALGICELL CALCIUM DRESSING 4\"X8) MISC Apply 1 Device topically daily 3/20/20   Andrés Noonan MD   [facility] COREY WOUND DRESSING MATRIX Apply topically Apply to left daily and cover with gauze 3/19/20   Andrés Noonan MD     Medications added or changed (ex. new medication, dosage change, interval change, formulation change):  Medication list as stated above    Medications removed from list (include reason, ex. noncompliance, medication cost, therapy complete etc.):   Pen needles clean up list  BP kit clean up list    Comments:  Medication list reviewed with patient and insurance claims verified. Patient states he is no longer taking gabapentin, trulicity, lantus. Reports only takes Humalog on sliding scale.     To my knowledge the above medication history is accurate as of 12/21/2020 11:10 AM.   Chelsey Hernandez CPhT   12/21/2020 11:10 AM

## 2020-12-21 NOTE — H&P
HISTORY AND PHYSICAL  (Hospitalist, Internal Medicine)  IDENTIFYING INFORMATION   PATIENT:  Mariel Forrest  MRN:  [id number]  ADMIT DATE: 12/20/2020      CHIEF COMPLAINT   shortness of breath    HISTORY OF PRESENT ILLNESS   Mariel Forrest is a 79 y.o. male with chronic systolic congestive heart failure, hypertension, diabetes mellitus type 2, on chronic insulin, coronary artery disease s/p PCI and stenting-2018, obstructive sleep apnea, chronic kidney disease stage III, hyperlipidemia, peripheral vascular disease, gangrene of left great toe, diabetic foot ulcer on the plantar aspect of left foot presented to ED with complaints of shortness of breath. Patient also reports lower extremity swelling, scrotal swelling. Patient denied any chest pain, fever, chills, cough. Patient also reports that Dr. Addy Hobbs and the vascular surgeon(patient could not recall the name) recommended that he be admitted to the hospital for possible amputation. Patient was recently discharged from [facility] after being treated for CHF exacerbation. Initially vitals in ED-/77, HR 88, RR 21, temperature 98, saturating 92% on 4 L of oxygen. Lab work significant for BUN 14, creatinine 1.6, lactic acid 1.3, proBNP 3684, troponin 0.024, WBC 10, hemoglobin 10.9.  UA not suggestive of infection, rapid Covid negative. Chest x-ray-borderline cardiomegaly with pulmonary vascular congestion. Patient received vancomycin, ceftriaxone, Lasix, morphine in the ER. Blood cultures were ordered.     PAST MEDICAL HISTORY PAST SURGICAL HISTORY   chronic systolic congestive heart failure, hypertension, diabetes mellitus type 2, on chronic insulin, coronary artery disease s/p PCI and stenting-2018, obstructive sleep apnea, chronic kidney disease stage III, hyperlipidemia, peripheral vascular disease, gangrene of left great toe, diabetic foot ulcer on the plantar aspect of left foot  PCI and stenting, colectomy, pacemaker placement   FAMILY HISTORY SOCIAL HISTORY    Reviewed and noncontributory   admits to smoking 2 cigars/day, denies any alcohol, occasional marijuana use   MEDICATIONS ALLERGIES    As per previous discharge summary-albuterol as needed, atorvastatin 40 mg nightly, carvedilol 3.125 mg twice daily, Plavix 75 mg daily, gabapentin 600 mg 3 times daily, doxazosin 2 mg nightly, losartan 25 mg daily, insulin sliding scale, metolazone 5 mg daily, torsemide 100 mg twice daily. Penicillin, fentanyl       PAST MEDICAL, SURGICAL, FAMILY, and SOCIAL HISTORY         Past Medical History:   Diagnosis Date    Acid reflux     Acute MI ([facility] 2004, 2008    Arthritis     Back    Broken teeth     Upper Front    CAD (coronary artery disease)     Sees Dr[facility] Timoteo [facility])     per old chart    CHF (congestive heart failure) ([facility])     Chronic back pain     Chronic kidney disease     STAGE 3 KIDNEY FAILURE- \"from my diabetes- do not follow with any one- have seen Dr Genny Woods in the past\"    Diabetes mellitus ([facility] Dx 1965    per old chart pt has been diabetic since age 13    Diabetic neuropathy ([facility]     \"in my feet\"    H/O cardiovascular stress test 08/25/2016    H/O Doppler ultrasound 09/27/2018    Moderate disease of the right lower extremity with an JALEN of 0.72.   Moderate to severe disease of the left lower extremity with an JALEN of 0.55.    H/O percutaneous left heart catheterization 11/20/2018    PATENT STENTS OF ALL THREE MAJOR VESSELS    History of irregular heartbeat     History of syncope     per old chart pt had hx syncope and dizziness for multiple yrs so ICD placed    Hyperlipidemia     Hypertension     Leg swelling     bilat---up to thighs---reduces at times with lying down    Necrotic toes ([facility])     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    Neuropathy     both feet    neuropathy     PAD (peripheral artery disease) ([facility] 09/27/2018    PVD (peripheral vascular disease) ([facility])     Sick sinus syndrome ([facility]     Sleep apnea     \"sleep study 3 yrs ago- could not tolerate the cpap made me too dry\"    Spinal stenosis     Teeth missing     Upper And Lower    Type 2 diabetes mellitus without complication [facility])      Past Surgical History:   Procedure Laterality Date    CARDIAC CATHETERIZATION      per old chart done 10/2014    CARDIAC CATHETERIZATION  07/14/2017    with angiography of leg    CARDIAC CATHETERIZATION  11/20/2018    PATENT STENTS OF ALL THREE MAJOR VESSELS    CARDIAC DEFIBRILLATOR PLACEMENT  06/04/2010    [employer] [address] Defibrillator Implanted    COLECTOMY Right 08/26/2016    laparascopic; robotic assisted    COLONOSCOPY  08/04/2016    CORONARY ANGIOPLASTY      \"15 Heart Stents\"    CORONARY ANGIOPLASTY WITH STENT PLACEMENT      per old chart had angio with stent to circumflex and obtuse marginal artery at [facility] 5/2010( old chart also gives hx of stent placement done 2000,2004 and 2005)   [facility]      Teeth Extracted In Past    PACEMAKER PLACEMENT  06/04/2010    [employer] [address]ura DR Defibrillator Implanted    TOE AMPUTATION Right 09/12/2017    Rt 3rd toe    TOE AMPUTATION Right 01/09/2018     Right 5th toe amputation and Toenails trimmed left 2,3,4 and 5th toes    VASCULAR SURGERY      per old chart had balloon angioplasty right superfical femoral artery,right popliteal artery,,right ant.tibial artery, right tibioperoneal trunk, and right post.tibial artery wna stent placement right popliteal artery and superfical femoral artery 7/2012     Family History   Problem Relation Age of Onset    Diabetes Mother     Stroke Mother     High Blood Pressure Mother     Vision Loss Mother     Cancer Father         Prostate Cancer    Diabetes Sister     Neuropathy Sister     Other Sister         \"Breathing Problems\"    Heart Disease Sister     Early Death Sister 62        Heart Complications    Cancer Brother         \"Stomach Cancer\"    High Blood Pressure Brother     Diabetes Brother    Earl Mcfadden Heart Disease Brother     High Blood Pressure Brother     Cancer Son         \"Testicle Cancer\"     Family Hx of HTN  Family Hx as reviewed above, otherwise non-contributory  Social History     Socioeconomic History    Marital status:      Spouse name: None    Number of children: None    Years of education: None    Highest education level: None   Occupational History    None   Social Needs    Financial resource strain: None    Food insecurity     Worry: None     Inability: None    Transportation needs     Medical: None     Non-medical: None   Tobacco Use    Smoking status: Former Smoker     Packs/day: 0.00     Years: 36.00     Pack years: 0.00     Types: Cigars     Start date: 1/1/1980    Smokeless tobacco: Never Used   Substance and Sexual Activity    Alcohol use: No     Frequency: Never    Drug use: Yes     Types: Marijuana    Sexual activity: Never   Lifestyle    Physical activity     Days per week: None     Minutes per session: None    Stress: None   Relationships    Social connections     Talks on phone: None     Gets together: None     Attends Shinto service: None     Active member of club or organization: None     Attends meetings of clubs or organizations: None     Relationship status: None    Intimate partner violence     Fear of current or ex partner: None     Emotionally abused: None     Physically abused: None     Forced sexual activity: None   Other Topics Concern    None   Social History Narrative    None       MEDICATIONS   Medications Prior to Admission  Not in a hospital admission.     Current Medications  Current Facility-Administered Medications   Medication Dose Route Frequency Provider Last Rate Last Admin    morphine sulfate (PF) injection 4 mg  4 mg Intravenous Q1H PRN Seleta Mems, PA-C   4 mg at 12/21/20 0402    vancomycin (VANCOCIN) 2,000 mg in dextrose 5 % 500 mL IVPB  2,000 mg Intravenous Once Seleta Mems, PA-C 250 mL/hr at 12/21/20 0402 2,000 mg at 12/21/20 0402     Current Outpatient Medications   Medication Sig Dispense Refill    pregabalin (LYRICA) 150 MG capsule TAKE 1 CAPSULE BY MOUTH THREE TIMES A DAY      doxazosin (CARDURA) 2 MG tablet Take 2 tablets by mouth 2 times daily 90 tablet 3    torsemide (DEMADEX) 100 MG tablet TAKE 1 TABLET BY MOUTH TWICE A DAY 60 tablet 3    insulin lispro (HUMALOG) 100 UNIT/ML injection vial Inject 0-6 Units into the skin 3 times daily (with meals) 1 vial 3    atorvastatin (LIPITOR) 40 MG tablet Take 1 tablet by mouth nightly 30 tablet 3    losartan (COZAAR) 25 MG tablet Take 1 tablet by mouth daily 30 tablet 3    carvedilol (COREG) 3.125 MG tablet Take 1 tablet by mouth 2 times daily 60 tablet 3    dicyclomine (BENTYL) 10 MG capsule Take 1 capsule by mouth 4 times daily (before meals and nightly) 120 capsule 3    metOLazone (ZAROXOLYN) 5 MG tablet Take 5 mg by mouth daily      albuterol sulfate HFA (VENTOLIN HFA) 108 (90 Base) MCG/ACT inhaler Inhale 2 puffs into the lungs every 4 hours as needed for Wheezing 1 Inhaler 3    clopidogrel (PLAVIX) 75 MG tablet Take 1 tablet by mouth daily 30 tablet 11    acetaminophen (TYLENOL) 325 MG tablet Take 2 tablets by mouth every 6 hours as needed for Pain 120 tablet 3    Calcium Alginate (ALGICELL CALCIUM DRESSING 4\"X8) MISC Apply 1 Device topically daily 30 each 3    PROMOGRAN COREY WOUND DRESSING MATRIX Apply topically Apply to left daily and cover with gauze 1 Package 3    BD ULTRA-FINE PEN NEEDLES 29G X 12.7MM MISC       Blood Pressure KIT BID for record down in log book 1 kit 0         Allergies  Allergies   Allergen Reactions    Pcn [Penicillins] Hives    Fentanyl Itching       REVIEW OF SYSTEMS   Within above limitations. 14 point review of systems reviewed. Pertinent positive or negative as per HPI or otherwise negative per 14 point systems review.       PHYSICAL EXAM     Wt Readings from Last 3 Encounters:   12/20/20 260 lb (117.9 kg)   12/17/20 293 lb 4.8 oz (133 kg)   12/13/20 293 lb 4.8 oz (133 kg)       Blood pressure (!) 161/74, pulse 66, temperature 98 °F (36.7 °C), temperature source Oral, resp. rate 10, height 6' (1.829 m), weight 260 lb (117.9 kg), SpO2 97 %. GEN  -Awake, alert, NAD.   EYES   -PERRL. HENT  -MM are moist.   RESP  -LS CTA equal bilat, no wheezes, rales or rhonchi. Symmetric chest movement. No respiratory distress noted. C/V  -S1/S2 auscultated. RRR. + peripheral edema. GI  -Abdomen is soft, non-distended, no significant tenderness.   -No CVA tenderness. Fuller catheter is not present. MS  -B/L extremities -pitting edema. Gangrenous changes on the tip of left great toe, plantar ulcer on the left foot between second and third toes. Amputated toes on right foot. SKIN  -Normal coloration, warm, dry. NEURO  -CN 2-12 appear grossly intact, normal speech, no lateralizing weakness. PSYC  -Awake, alert, oriented x 3. Appropriate affect. LABS AND IMAGING     Results for Marian Marion (MRN [id number]) as of 12/21/2020 05:37   Ref.  Range 12/21/2020 00:17 12/21/2020 02:06   Sodium Latest Ref Range: 135 - 145 MMOL/L 137    Potassium Latest Ref Range: 3.5 - 5.1 MMOL/L 4.5    Chloride Latest Ref Range: 99 - 110 mMol/L 103    CO2 Latest Ref Range: 21 - 32 MMOL/L 28    BUN Latest Ref Range: 6 - 23 MG/DL 14    Creatinine Latest Ref Range: 0.9 - 1.3 MG/DL 1.6 (H)    Anion Gap Latest Ref Range: 4 - 16  6    GFR Non- Latest Ref Range: >60 mL/min/1.73m2 43 (L)    GFR  Latest Ref Range: >60 mL/min/1.73m2 52 (L)    Lactate, ser/plas Latest Ref Range: 0.4 - 2.0 mMOL/L 1.3    Glucose Latest Ref Range: 70 - 99 MG/ (H)    Calcium Latest Ref Range: 8.3 - 10.6 MG/DL 8.3    Total Protein Latest Ref Range: 6.4 - 8.2 GM/DL 7.2    Pro-BNP Latest Ref Range: <300 PG/ML 3,684 (H)    Troponin T Latest Ref Range: <0.01 NG/ML 0.024 (H)    Albumin Latest Ref Range: 3.4 - 5.0 GM/DL 3.3 (L)    Alk Phos Latest Ref Range: 40 - MG/DL  NEGATIVE   Nitrite Urine, Quantitative Latest Ref Range: NEGATIVE   NEGATIVE   pH, Urine Latest Ref Range: 5.0 - 8.0   7.0   WBC, UA Latest Ref Range: 0 - 2 /HPF  <1   RBC, UA Latest Ref Range: 0 - 3 /HPF  NONE SEEN   Squam Epithel, UA Latest Units: /HPF  <1   Bacteria, UA Latest Ref Range: NEGATIVE /HPF  NEGATIVE   Trichomonas, UA Latest Ref Range: NONE SEEN /HPF  NONE SEEN   URINE RT REFLEX TO CULTURE Unknown  Rpt (A)     Results for Jenna Cast (MRN [id number]) as of 12/21/2020 05:37   Ref. Range 12/21/2020 02:06   Color, UA Latest Ref Range: YELLOW  STRAW (A)   Clarity, UA Latest Ref Range: CLEAR  CLEAR   Bilirubin, Urine Latest Ref Range: NEGATIVE MG/DL NEGATIVE   Ketones, Urine Latest Ref Range: NEGATIVE MG/DL NEGATIVE   Specific Gravity, UA Latest Ref Range: 1.001 - 1.035  1.008   Blood, Urine Latest Ref Range: NEGATIVE  NEGATIVE   Protein, UA Latest Ref Range: NEGATIVE MG/DL 30 (A)   Urobilinogen, Urine Latest Ref Range: 0.2 - 1.0 MG/DL NORMAL   Leukocyte Esterase, Urine Latest Ref Range: NEGATIVE  NEGATIVE   Glucose, Urine Latest Ref Range: NEGATIVE MG/DL NEGATIVE   Nitrite Urine, Quantitative Latest Ref Range: NEGATIVE  NEGATIVE   pH, Urine Latest Ref Range: 5.0 - 8.0  7.0   WBC, UA Latest Ref Range: 0 - 2 /HPF <1   RBC, UA Latest Ref Range: 0 - 3 /HPF NONE SEEN   Squam Epithel, UA Latest Units: /HPF <1   Bacteria, UA Latest Ref Range: NEGATIVE /HPF NEGATIVE   Trichomonas, UA Latest Ref Range: NONE SEEN /HPF NONE SEEN     Results for Jenna Cast (MRN [id number]) as of 12/21/2020 05:37   Ref. Range 12/21/2020 04:15   SARS-CoV-2, NAAT Unknown NOT DETECTED     Recent Imaging     XR FOOT LEFT (MIN 3 VIEWS) [[id number]] Collected: 12/21/20 0110     Order Status: Completed Updated: 12/21/20 0117     Narrative:       EXAMINATION:   THREE XRAY VIEWS OF THE LEFT FOOT     12/21/2020 12:00 am     COMPARISON:   None.      HISTORY:   ORDERING SYSTEM PROVIDED HISTORY: ulcers, worsening pain   TECHNOLOGIST PROVIDED HISTORY:   Reason for exam:->ulcers, worsening pain   Reason for Exam: ulcers, worsening pain   Acuity: Acute   Type of Exam: Initial   Mechanism of Injury: ulcers, worsening pain   Relevant Medical/Surgical History: ulcers, worsening pain     FINDINGS:   Osteopenia.  Soft tissue swelling of the 1st digit.  An ulcer is present at   the distal 1st digit, which extends to the level of the distal phalanx.  No   acute fracture.  Mild degenerative changes at the interphalangeal joints. The Lisfranc joint is intact.  No erosive changes are seen.      Impression:       Large ulcer involving the distal 1st digit, which extends to the level of the   tip of the 1st distal phalanx.  There is associated soft tissue swelling,   most likely related to cellulitis.      XR CHEST PORTABLE [[id number]] Collected: 12/21/20 0029     Order Status: Completed Updated: 12/21/20 0041     Narrative:       EXAMINATION:   ONE XRAY VIEW OF THE CHEST     12/21/2020 12:00 am     COMPARISON:   12/09/2020     HISTORY:   ORDERING SYSTEM PROVIDED HISTORY: sob   TECHNOLOGIST PROVIDED HISTORY:   Reason for exam:->sob   Reason for Exam: sob   Acuity: Acute   Type of Exam: Initial   Mechanism of Injury: sob   Relevant Medical/Surgical History: sob     FINDINGS:   Pacemaker wires.  Borderline cardiomegaly.  The pulmonary vasculature appears   congested.  No focal consolidations.  No pleural effusion or pneumothorax.      Impression:       Borderline cardiomegaly with pulmonary vascular congestion.          Relevant labs and imaging reviewed    ASSESSMENT AND PLAN     #. Acute on chronic systolic CHF:  -Patient complaining of shortness of breath, has lower extremity swelling, scrotal swelling, mildly elevated proBNP.   -Patient admits to not having good urine output after taking diuretics at home.  -Patient is on torsemide 100 mg twice daily, metolazone daily  -Patient received IV Lasix 40 mg in the ED.  -We will continue with IV Bumex as he responded well in the recent admission.  -Strict input, output, daily weight.  -Consult cardiology. #.  Acute hypoxic respiratory failure: Possibly secondary to fluid overload from CHF exacerbation  -Patient was saturating 85% on room air at presentation. Was saturating 90% during my evaluation.  -Wean off of oxygen as tolerated. .    #.  Peripheral vascular disease with gangrenous changes to the left great toe:  -Consult Dr. Majo Lopez  -Patient received vancomycin, ceftriaxone in the ER  -Arterial Dopplers-12/11/2020- demonstrated dampened waveforms left lower extremity with high-grade stenosis involving the right distal superficial femoral artery and proximal popliteal artery. #. hypertension with accelerated hypertension at admission  -Resume home medications-losartan, carvedilol and titrate. #. diabetes mellitus type 2, on chronic insulin  -HbA1c 6.6-12/10/2020  -Patient is currently on insulin sliding scale. Was taken off of long-acting insulin recently.  -Continue insulin sliding scale with hypoglycemia protocol    #. diabetic foot ulcer on the plantar aspect of left foot    #. coronary artery disease s/p PCI and stenting-2018  -Continue aspirin, Plavix, carvedilol, losartan, statin    #. obstructive sleep apnea-not compliant with BiPAP/CPAP    #. chronic kidney disease stage III    #. Hyperlipidemia-continue atorvastatin     DVT Prophylaxis: Heparin  GI Prophylaxis: Not indicated  Code Status: FULL.       Case d/w ED physician    Orlando Greer MD  Hospitalist, Internal Medicine  12/21/2020 at 5:07 AM

## 2020-12-22 LAB
ANION GAP SERPL CALCULATED.3IONS-SCNC: 7 MMOL/L (ref 4–16)
BASOPHILS ABSOLUTE: 0 K/CU MM
BASOPHILS RELATIVE PERCENT: 0.3 % (ref 0–1)
BUN BLDV-MCNC: 16 MG/DL (ref 6–23)
CALCIUM SERPL-MCNC: 7.9 MG/DL (ref 8.3–10.6)
CHLORIDE BLD-SCNC: 105 MMOL/L (ref 99–110)
CO2: 29 MMOL/L (ref 21–32)
CREAT SERPL-MCNC: 1.8 MG/DL (ref 0.9–1.3)
DIFFERENTIAL TYPE: ABNORMAL
EOSINOPHILS ABSOLUTE: 0.1 K/CU MM
EOSINOPHILS RELATIVE PERCENT: 2 % (ref 0–3)
GFR AFRICAN AMERICAN: 45 ML/MIN/1.73M2
GFR NON-AFRICAN AMERICAN: 37 ML/MIN/1.73M2
GLUCOSE BLD-MCNC: 148 MG/DL (ref 70–99)
GLUCOSE BLD-MCNC: 155 MG/DL (ref 70–99)
GLUCOSE BLD-MCNC: 158 MG/DL (ref 70–99)
GLUCOSE BLD-MCNC: 158 MG/DL (ref 70–99)
GLUCOSE BLD-MCNC: 235 MG/DL (ref 70–99)
HCT VFR BLD CALC: 31.8 % (ref 42–52)
HEMOGLOBIN: 9.4 GM/DL (ref 13.5–18)
IMMATURE NEUTROPHIL %: 0.2 % (ref 0–0.43)
LYMPHOCYTES ABSOLUTE: 1.3 K/CU MM
LYMPHOCYTES RELATIVE PERCENT: 20.6 % (ref 24–44)
MAGNESIUM: 1.9 MG/DL (ref 1.8–2.4)
MCH RBC QN AUTO: 28.5 PG (ref 27–31)
MCHC RBC AUTO-ENTMCNC: 29.6 % (ref 32–36)
MCV RBC AUTO: 96.4 FL (ref 78–100)
MONOCYTES ABSOLUTE: 0.4 K/CU MM
MONOCYTES RELATIVE PERCENT: 6.7 % (ref 0–4)
NUCLEATED RBC %: 0 %
PDW BLD-RTO: 14.2 % (ref 11.7–14.9)
PLATELET # BLD: 207 K/CU MM (ref 140–440)
PMV BLD AUTO: 10.4 FL (ref 7.5–11.1)
POTASSIUM SERPL-SCNC: 4.8 MMOL/L (ref 3.5–5.1)
RBC # BLD: 3.3 M/CU MM (ref 4.6–6.2)
SEGMENTED NEUTROPHILS ABSOLUTE COUNT: 4.5 K/CU MM
SEGMENTED NEUTROPHILS RELATIVE PERCENT: 70.2 % (ref 36–66)
SODIUM BLD-SCNC: 141 MMOL/L (ref 135–145)
TOTAL IMMATURE NEUTOROPHIL: 0.01 K/CU MM
TOTAL NUCLEATED RBC: 0 K/CU MM
WBC # BLD: 6.5 K/CU MM (ref 4–10.5)

## 2020-12-22 PROCEDURE — 36415 COLL VENOUS BLD VENIPUNCTURE: CPT

## 2020-12-22 PROCEDURE — 85025 COMPLETE CBC W/AUTO DIFF WBC: CPT

## 2020-12-22 PROCEDURE — 99233 SBSQ HOSP IP/OBS HIGH 50: CPT | Performed by: SURGERY

## 2020-12-22 PROCEDURE — APPSS45 APP SPLIT SHARED TIME 31-45 MINUTES: Performed by: NURSE PRACTITIONER

## 2020-12-22 PROCEDURE — 83735 ASSAY OF MAGNESIUM: CPT

## 2020-12-22 PROCEDURE — 6370000000 HC RX 637 (ALT 250 FOR IP): Performed by: INTERNAL MEDICINE

## 2020-12-22 PROCEDURE — 94761 N-INVAS EAR/PLS OXIMETRY MLT: CPT

## 2020-12-22 PROCEDURE — 2580000003 HC RX 258: Performed by: INTERNAL MEDICINE

## 2020-12-22 PROCEDURE — 1200000000 HC SEMI PRIVATE

## 2020-12-22 PROCEDURE — 2500000003 HC RX 250 WO HCPCS: Performed by: INTERNAL MEDICINE

## 2020-12-22 PROCEDURE — 6360000002 HC RX W HCPCS: Performed by: INTERNAL MEDICINE

## 2020-12-22 PROCEDURE — 99233 SBSQ HOSP IP/OBS HIGH 50: CPT | Performed by: INTERNAL MEDICINE

## 2020-12-22 PROCEDURE — 82962 GLUCOSE BLOOD TEST: CPT

## 2020-12-22 PROCEDURE — 2700000000 HC OXYGEN THERAPY PER DAY

## 2020-12-22 PROCEDURE — 80048 BASIC METABOLIC PNL TOTAL CA: CPT

## 2020-12-22 PROCEDURE — C2623 CATH, TRANSLUMIN, DRUG-COAT: HCPCS

## 2020-12-22 RX ADMIN — BUMETANIDE 1 MG: 0.25 INJECTION INTRAMUSCULAR; INTRAVENOUS at 08:55

## 2020-12-22 RX ADMIN — PREGABALIN 150 MG: 75 CAPSULE ORAL at 20:58

## 2020-12-22 RX ADMIN — HEPARIN SODIUM 5000 UNITS: 5000 INJECTION INTRAVENOUS; SUBCUTANEOUS at 20:57

## 2020-12-22 RX ADMIN — HEPARIN SODIUM 5000 UNITS: 5000 INJECTION INTRAVENOUS; SUBCUTANEOUS at 14:14

## 2020-12-22 RX ADMIN — METOLAZONE 2.5 MG: 2.5 TABLET ORAL at 09:49

## 2020-12-22 RX ADMIN — METOLAZONE 2.5 MG: 2.5 TABLET ORAL at 20:59

## 2020-12-22 RX ADMIN — ATORVASTATIN CALCIUM 40 MG: 40 TABLET, FILM COATED ORAL at 20:58

## 2020-12-22 RX ADMIN — CARVEDILOL 3.12 MG: 3.12 TABLET, FILM COATED ORAL at 08:46

## 2020-12-22 RX ADMIN — MORPHINE SULFATE 2 MG: 2 INJECTION, SOLUTION INTRAMUSCULAR; INTRAVENOUS at 20:57

## 2020-12-22 RX ADMIN — BUMETANIDE 1 MG: 0.25 INJECTION INTRAMUSCULAR; INTRAVENOUS at 20:59

## 2020-12-22 RX ADMIN — CARVEDILOL 3.12 MG: 3.12 TABLET, FILM COATED ORAL at 20:58

## 2020-12-22 RX ADMIN — SODIUM CHLORIDE, PRESERVATIVE FREE 10 ML: 5 INJECTION INTRAVENOUS at 20:58

## 2020-12-22 RX ADMIN — LOSARTAN POTASSIUM 25 MG: 25 TABLET, FILM COATED ORAL at 08:46

## 2020-12-22 RX ADMIN — DOXAZOSIN 4 MG: 4 TABLET ORAL at 20:58

## 2020-12-22 RX ADMIN — SODIUM CHLORIDE, PRESERVATIVE FREE 10 ML: 5 INJECTION INTRAVENOUS at 08:47

## 2020-12-22 RX ADMIN — DOXAZOSIN 4 MG: 4 TABLET ORAL at 08:46

## 2020-12-22 RX ADMIN — CLOPIDOGREL BISULFATE 75 MG: 75 TABLET ORAL at 08:46

## 2020-12-22 RX ADMIN — HEPARIN SODIUM 5000 UNITS: 5000 INJECTION INTRAVENOUS; SUBCUTANEOUS at 06:40

## 2020-12-22 RX ADMIN — PROMETHAZINE HYDROCHLORIDE 12.5 MG: 25 TABLET ORAL at 20:58

## 2020-12-22 RX ADMIN — PREGABALIN 150 MG: 75 CAPSULE ORAL at 08:46

## 2020-12-22 ASSESSMENT — ENCOUNTER SYMPTOMS
EYE ITCHING: 0
CONSTIPATION: 0
PHOTOPHOBIA: 0
SORE THROAT: 0
RECTAL PAIN: 0
SHORTNESS OF BREATH: 1
BACK PAIN: 0
ANAL BLEEDING: 0
EYE REDNESS: 0
COLOR CHANGE: 0
CHOKING: 0
APNEA: 0
STRIDOR: 0
CHEST TIGHTNESS: 1

## 2020-12-22 ASSESSMENT — PAIN DESCRIPTION - PAIN TYPE: TYPE: CHRONIC PAIN;ACUTE PAIN

## 2020-12-22 NOTE — PROGRESS NOTES
Cardiology Progress Note     Today's Plan cpm    Admit Date:  12/20/2020    Consult reason/ Seen today for: shortness of breath - non healing left foot great toe ulcer    Subjective and  Overnight Events:  Reports his breathing has improved- continues with scrotal and leg edema. Denies chest pain    Assessment / Plan / Recommendation:     1. Acute on chronic combined decompensated heart failure. EF of 45% with LVH NYHA Class: II.  Volume overloaded. Fluid status is unknown. Continue with IV diuresis- bumex 1 mg bid/ metolazone 2.5 mg bid-  Please continue [facility] recommended medical thearpy including coreg and losartan daily. Daily weights, strict Is and O's  Fluid restriction 1800 ml- low sodium diet   2. PAD- H/o of PTA of right SFA and popliteal-recent arterial doppler with dampened waveforms- for angiogram with CTS- on plavix   3. CAD- stable -has chronic elevation of troponin- continue BB and plavix  4. non healing ulcer to left great toe- has seen surgery (Dr Aj Patton) -possible amputation   5. Uncontrolled HTN:bp improved- continue with losartan           History of Presenting Illness:    Chief complain on admission : 79 y. o.year old who is admitted for  Chief Complaint   Patient presents with    Shortness of Breath     Worsening tonight, worse with movement    Groin Swelling     x2wks    Wound Infection     Left foot amputation per Dr. Aj Patton.          Past medical history:    has a past medical history of Acid reflux, Acute MI ([address] Arthritis, Broken teeth, CAD (coronary artery disease), Cardiomyopathy ([address] CHF (congestive heart failure) ([address] Chronic back pain, Chronic kidney disease, Diabetes mellitus ([address] Diabetic neuropathy ([address] H/O cardiovascular stress test, H/O Doppler ultrasound, H/O percutaneous left heart catheterization, History of irregular heartbeat, History of syncope, Hyperlipidemia, Hypertension, Leg swelling, Necrotic toes (HCC), Neuropathy, neuropathy, PAD (peripheral artery disease) ([facility] PVD (peripheral vascular disease) ([facility] Sick sinus syndrome ([facility] Sleep apnea, Spinal stenosis, Teeth missing, and Type 2 diabetes mellitus without complication ([facility] Past surgical history:   has a past surgical history that includes Coronary angioplasty with stent; Dental surgery; Colonoscopy (08/04/2016); pacemaker placement (06/04/2010); vascular surgery; colectomy (Right, 08/26/2016); Toe amputation (Right, 09/12/2017); Toe amputation (Right, 01/09/2018); Cardiac catheterization; Cardiac defibrillator placement (06/04/2010); Coronary angioplasty; Cardiac catheterization (07/14/2017); and Cardiac catheterization (11/20/2018). Social History:   reports that he has quit smoking. His smoking use included cigars. He started smoking about 41 years ago. He smoked 0.00 packs per day for 36.00 years. He has never used smokeless tobacco. He reports current drug use. Drug: Marijuana. He reports that he does not drink alcohol. Family history:  family history includes Cancer in his brother, father, and son; Diabetes in his brother, mother, and sister; Early Death (age of onset: 62) in his sister; Heart Disease in his brother and sister; High Blood Pressure in his brother, brother, and mother; Neuropathy in his sister; Other in his sister; Stroke in his mother; Vision Loss in his mother.     Allergies   Allergen Reactions    Pcn [Penicillins] Hives    Fentanyl Itching       Review of Systems:   All 14 systems were reviewed and are negative  Except for the positive findings which are documented     BP (!) 133/56   Pulse 86   Temp 97.8 °F (36.6 °C) (Oral)   Resp 17   Ht 6' (1.829 m)   Wt 293 lb 14 oz (133.3 kg)   SpO2 95%   BMI 39.86 kg/m²       Intake/Output Summary (Last 24 hours) at 12/22/2020 1241  Last data filed at 12/22/2020 0845  Gross per 24 hour   Intake 10 ml   Output --   Net 10 ml       Physical Exam:  Constitutional:  Well developed, No acute distress  HENT:  Normocephalic, Atraumatic, Bilateral external ears normal,  Nose normal.   Neck- trachea is midline  Eyes:  PEERL, Conjunctiva normal  Respiratory: decreased breath sounds, No respiratory distress, No wheezing, No chest tenderness. Cardiovascular:  Normal heart rate, Normal rhythm, no murmurs appreciated, No rubs appreciated, No gallops appreciated, JVP not elevated  Abdomen/GI:  Soft, No tenderness  Musculoskeletal:  Intact distal pulses, + edema to lower legs, + tenderness, No cyanosis, No clubbing. - scrotal swelling   Integument:  Warm, Dry- ulcer to left great toe   Lymphatic:  No lymphadenopathy noted. Neurologic:  Alert & oriented  Psychiatric:  Affect and Mood :pleasant     Medications:    sodium chloride flush  10 mL Intravenous 2 times per day    bumetanide  1 mg Intravenous BID    losartan  25 mg Oral Daily    insulin lispro  0-12 Units Subcutaneous TID WC    insulin lispro  0-6 Units Subcutaneous Nightly    atorvastatin  40 mg Oral Nightly    carvedilol  3.125 mg Oral BID    clopidogrel  75 mg Oral Daily    doxazosin  4 mg Oral BID    pregabalin  150 mg Oral BID    heparin (porcine)  5,000 Units Subcutaneous 3 times per day    metOLazone  2.5 mg Oral BID      dextrose       sodium chloride flush, promethazine **OR** ondansetron, polyethylene glycol, acetaminophen **OR** acetaminophen, glucose, dextrose, glucagon (rDNA), dextrose, albuterol sulfate HFA, morphine    Lab Data:  CBC:   Recent Labs     12/21/20 0017 12/22/20 0248   WBC 10.0 6.5   HGB 10.9* 9.4*   HCT 36.4* 31.8*   MCV 97.1 96.4    207     BMP:   Recent Labs     12/21/20 0017 12/22/20  0248    141   K 4.5 4.8    105   CO2 28 29   BUN 14 16   CREATININE 1.6* 1.8*     PT/INR: No results for input(s): PROTIME, INR in the last 72 hours.   BNP:    Recent Labs     12/21/20 0017   PROBNP 3,684*     TROPONIN:   Recent Labs     12/21/20 0017 TROPONINT 0.024*            Impression:  Active Problems:    Chronic coronary artery disease    Automatic implantable cardioverter-defibrillator in situ    Chronic kidney disease, stage III (moderate)    PVD (peripheral vascular disease) ([facility]    Acute on chronic systolic CHF (congestive heart failure) ([facility]  Resolved Problems:    * No resolved hospital problems. *       All labs, medications and tests reviewed by myself, continue all other medications of all above medical condition listed as is except for changes mentioned above. Thank you   Please call with questions. Electronically signed by MICKY Burleson CNP on 12/22/2020 at 12:41 PM   CARDIOLOGY ATTENDING ADDENDUM    I have seen ,spoken to  and examined this patient personally, independently of the nurse practitioner. I have reviewed the hospital care given to date and reviewed all pertinent labs and imaging. The plan was developed mutually at the time of the visit with the patient,  NP   and myself. I have spoken with patient, nursing staff and provided written and verbal instructions . The above note has been reviewed and I agree with the assessment, diagnosis, and treatment plan with changes made by me as follows     HPI:  I have reviewed the above HPI  And agree with above   Perla Sanchez is a 79 y. o.year old who and presents with had concerns including Shortness of Breath (Worsening tonight, worse with movement), Groin Swelling (x2wks), and Wound Infection (Left foot amputation per Dr. Roger Humphries. ). Chief Complaint   Patient presents with    Shortness of Breath     Worsening tonight, worse with movement    Groin Swelling     x2wks    Wound Infection     Left foot amputation per Dr. Roger Humphries.       Please review addendum/changes made to note above   Interval history: Planing of left toe pain foot pain he says he would like it amputated before he gets discharged    Physical Exam:  General:  Awake, alert, NAD  Head:normal  Eye:normal  Neck:  No JVD   Chest: Clear to auscultation, respiration easy  Cardiovascular:  S1 and S2 audible, No added heart sounds, No signs of ankle edema, or volume overload, No evidence of JVD, No crackles  Abdomen:   nontender  Extremities:  2+ edema, there is post right toe amputation left toe open dry gangrene osteo-  Pulses; palpable  Neuro: grossly normal      MEDICAL DECISION MAKING;    I agree with the above plan, which was planned by myself and discussed with NP.   Continue diuresis  Discussed with CT surgery plan for left lower extremity angiogram by CT surgery  Pressure is better controlled  Continue Plavix    Radha Godwin MD [facility] 12/22/20

## 2020-12-22 NOTE — PROGRESS NOTES
General Surgery-[facility] Day: 3    ChiefComplaint on Admission: left great toe ischemia      Subjective:     Karis Long is a 79 y.o. male with left great toe ischemia . Patient reports minimal toe pain. Tolerating DIET CARB CONTROL; Daily Fluid Restriction: 1000 ml. + BM.     ROS:  Review of Systems   Constitutional: Negative for chills and fever. HENT: Negative for ear pain, mouth sores, sore throat and tinnitus. Eyes: Negative for photophobia, redness and itching. Respiratory: Positive for shortness of breath. Negative for apnea, choking and stridor. Cardiovascular: Negative for chest pain and palpitations. Gastrointestinal: Negative for anal bleeding, constipation and rectal pain. Endocrine: Negative for polydipsia. Genitourinary: Negative for enuresis, flank pain and hematuria. Musculoskeletal: Negative for back pain, joint swelling and myalgias. Skin: Positive for wound. Negative for color change and pallor. Allergic/Immunologic: Negative for environmental allergies. Neurological: Negative for syncope and speech difficulty. Psychiatric/Behavioral: Negative for confusion and hallucinations. Allergies  Pcn [penicillins] and Fentanyl          Diagnosis Date    Acid reflux     Acute MI ([address] 2004, 2008    Arthritis     Back    Broken teeth     Upper Front    CAD (coronary artery disease)     Sees Dr. Jhon Llanos [facility])     per old chart    CHF (congestive heart failure) ([facility]     Chronic back pain     Chronic kidney disease     STAGE 3 KIDNEY FAILURE- \"from my diabetes- do not follow with any one- have seen Dr Abdirizak Ferguson in the past\"    Diabetes mellitus ([facility] Dx 1965    per old chart pt has been diabetic since age 13    Diabetic neuropathy ([address]     \"in my feet\"    H/O cardiovascular stress test 08/25/2016    H/O Doppler ultrasound 09/27/2018    Moderate disease of the right lower extremity with an JALEN of 0.72.   Moderate to severe disease of the left lower extremity with an JALEN of 0.55.   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    Neuropathy     both feet    neuropathy     PAD (peripheral artery disease) ([facility] 2018    [facility]     Sick sinus syndrome ([facility]     Sleep apnea     \"sleep study 3 yrs ago- could not tolerate the cpap made me too dry\"    Spinal stenosis     Teeth missing     Upper And Lower    Type 2 diabetes mellitus without complication ([facility])        Objective:     Vitals:    20 0845   BP: (!) 133/56   Pulse: 86   Resp: 17   Temp: 97.8 °F (36.6 °C)   SpO2: 95%       TEMPERATURE:  Current - Temp: 97.8 °F (36.6 °C); Max - Temp  Av.1 °F (36.7 °C)  Min: 97.8 °F (36.6 °C)  Max: 98.5 °F (36.9 °C)    No intake/output data recorded. I/O last 3 completed shifts: In: 10 [I.V.:10]  Out: -       Physical Exam:  Physical Exam  Constitutional:       Appearance: He is well-developed. HENT:      Head: Normocephalic. Eyes:      Pupils: Pupils are equal, round, and reactive to light. Neck:      Musculoskeletal: Normal range of motion and neck supple. Cardiovascular:      Rate and Rhythm: Normal rate. Pulmonary:      Effort: Respiratory distress present. Abdominal:      General: There is no distension. Palpations: Abdomen is soft. There is no mass. Tenderness: There is no abdominal tenderness. There is no guarding or rebound. Musculoskeletal: Normal range of motion. Feet:    Skin:     General: Skin is warm. Neurological:      Mental Status: He is alert and oriented to person, place, and time. Scheduled Meds:   sodium chloride flush  10 mL Intravenous 2 times per day    bumetanide  1 mg Intravenous BID    losartan  25 mg Oral Daily    insulin lispro  0-12 Units Subcutaneous TID WC    insulin lispro  0-6 Units Subcutaneous Nightly    atorvastatin  40 mg Oral Nightly    carvedilol  3.125 mg Oral BID    clopidogrel  75 mg Oral Daily    doxazosin  4 mg Oral BID    pregabalin  150 mg Oral BID    heparin (porcine)  5,000 Units Subcutaneous 3 times per day    metOLazone  2.5 mg Oral BID     Continuous Infusions:   dextrose       PRN Meds:sodium chloride flush, promethazine **OR** ondansetron, polyethylene glycol, acetaminophen **OR** acetaminophen, glucose, dextrose, glucagon (rDNA), dextrose, albuterol sulfate HFA, morphine      Labs/Imaging Results:   Lab Results   Component Value Date    WBC 6.5 12/22/2020    HGB 9.4 (L) 12/22/2020    HCT 31.8 (L) 12/22/2020    MCV 96.4 12/22/2020     12/22/2020     Lab Results   Component Value Date     12/22/2020    K 4.8 12/22/2020     12/22/2020    CO2 29 12/22/2020    BUN 16 12/22/2020    CREATININE 1.8 (H) 12/22/2020    GLUCOSE 148 (H) 12/22/2020    CALCIUM 7.9 (L) 12/22/2020    PROT 7.2 12/21/2020    LABALBU 3.3 (L) 12/21/2020    BILITOT 0.4 12/21/2020    ALKPHOS 73 12/21/2020    AST 11 (L) 12/21/2020    ALT 6 (L) 12/21/2020    LABGLOM 37 (L) 12/22/2020    GFRAA 45 (L) 12/22/2020       Assessment:     Patient Active Problem List:     Chronic coronary artery disease     Automatic implantable cardioverter-defibrillator in situ     Chronic combined systolic and diastolic heart failure (HCC)     Chronic kidney disease, stage III (moderate)     Mixed hyperlipidemia     Sick sinus syndrome (HCC)     Type 2 diabetes mellitus with diabetic polyneuropathy (HCC)     Spinal stenosis of lumbar region     Obesity, Class I, BMI 30-34.9     Postoperative hypertension     S/P partial colectomy     Tubulovillous adenoma of colon Microalbuminuria     PVD (peripheral vascular disease) (HCC)     Limb ischemia     Necrotic toes (HCC)     Toe gangrene (HCC)     Diabetic foot infection (HCC)     Chronic kidney disease (CKD) stage G3a/A2, moderately decreased glomerular filtration rate (GFR) between 45-59 mL/min/1.73 square meter and albuminuria creatinine ratio between  mg/g     DM (diabetes mellitus) (HCC)     Edema     ICD (implantable cardioverter-defibrillator) battery depletion     Hyperkalemia     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[facility])     Ischemia of toe     Acute kidney injury ([facility]     Fluid overload     DM (diabetes mellitus) ([facility]     Hypertensive emergency     Precordial pain     Acute chest pain     Unstable angina (HCC)     Chronic kidney disease (CKD) stage G3a/A3, moderately decreased glomerular filtration rate (GFR) between 45-59 mL/min/1.73 square meter and albuminuria creatinine ratio greater than 300 mg/g     Cardiomyopathy ([facility]     Hypertensive crisis     Diabetic neuropathy (HCC)     HTN (hypertension)     Epigastric pain     Heart failure exacerbated by sotalol (HCC)     Bilateral lower extremity edema     Elevated troponin     Acute on chronic systolic CHF (congestive heart failure) ([address]      Plan:       Care discussed with Dr Diamante Maloney for possible combined CO2 and contrast angio to minimize kidney exposure. Cont local wound care with beta dine paint on the open wound for now. Will plan for debridement after vascular intervention.        Melva Magallon MD

## 2020-12-22 NOTE — PROGRESS NOTES
Swelling noted to bilateral lower extremities +2-+3. Legs dry. Swelling noted to patients scrotum-very painful. Abrasions noted to bilateral feet with toes missing from both feet and a large blister noted to the top of the left foot. Patient states he is supposed to have toe amputated on left foot. Dr Mitzi Ross to see.

## 2020-12-22 NOTE — CONSULTS
Pt seen ,examined,interviewed and chart reviewed. Please see the dictated consult for details     Imp :   1. CKd stage 3 a A3- slowly progressing renal dz   2. Severe ASCVd with Toe / foot gangrene -   3. Underlying CMP/DM/ HTn to  Name  a few   4. multifactorial anemia  5. ADHF     Plan:  1. Ok with angiogram  2. I have d/w pt about the potential  Of contrast  Associated   NANCY- and cholesterol emboli about 1 % -  - we both agreed his benefit out weight th risk  3. Hold arb  4. Agree with diuretics - adjust as  he does   5. Follow clinically  And  bio chemically   6. BS control   7.  Manual BP       Thanks for the consult    #[id number]

## 2020-12-22 NOTE — CONSULTS
Department of GeneralSurgery   Surgical Service Dr Yovanny Castillo   Consult Note    Date of Consult: 12/21/20    Reason for Consult:  Left foot wound  Requesting Physician:  Dr Georgine Gitelman:  Left foot wound    History Obtained From:  patient    HISTORY OF PRESENT ILLNESS:                The patient is a 79 y.o. male who presents with necrotic wound of the left foot. The wound partially auto-amputated. Pt with known PVD. He was recently seen in my office for gangrene of the left great toe. Pt was seen by Dr Christine Valladares for evaluation of PVD. He was told to go to ED for further w/u but pt didn't. Pain is described as dull. Pain level is 3/10. Pt presented today for SOA. He has CHF and stage 3 CKD. Pt denies fever chills. Past Medical History:    Past Medical History:   Diagnosis Date    Acid reflux     Acute MI ([address] 2004, 2008    Arthritis     Back    Broken teeth     Upper Front    CAD (coronary artery disease)     Sees Dr. Nghia Wallace [facility])     per old chart    CHF (congestive heart failure) ([address]     Chronic back pain     Chronic kidney disease     STAGE 3 KIDNEY FAILURE- \"from my diabetes- do not follow with any one- have seen Dr Mona Berrios in the past\"    Diabetes mellitus ([facility] Dx 1965    per old chart pt has been diabetic since age 13    Diabetic neuropathy ([address]     \"in my feet\"    H/O cardiovascular stress test 08/25/2016    H/O Doppler ultrasound 09/27/2018    Moderate disease of the right lower extremity with an JALEN of 0.72.   Moderate to severe disease of the left lower extremity with an JALEN of 0.55.   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    Neuropathy     both feet    neuropathy     PAD (peripheral artery disease) ([facility] 09/27/2018    [facility]VD [facility]     Sick sinus syndrome ([facility])     Sleep apnea     \"sleep study 3 yrs ago- could not tolerate the cpap made me too dry\"    Spinal stenosis     Teeth missing     Upper And Lower    Type 2 diabetes mellitus without complication ([facility]        Past Surgical History:    Past Surgical History:   Procedure Laterality Date    CARDIAC CATHETERIZATION      per old chart done 10/2014    CARDIAC CATHETERIZATION  07/14/2017    with angiography of leg    CARDIAC CATHETERIZATION  11/20/2018    PATENT STENTS OF ALL THREE MAJOR VESSELS    CARDIAC DEFIBRILLATOR PLACEMENT  06/04/2010    [employer] [address] DR Defibrillator Implanted    COLECTOMY Right 08/26/2016    laparascopic; robotic assisted    COLONOSCOPY  08/04/2016    CORONARY ANGIOPLASTY      \"15 Heart Stents\"    CORONARY ANGIOPLASTY WITH STENT PLACEMENT      per old chart had angio with stent to circumflex and obtuse marginal artery at [facility] 5/2010( old chart also gives hx of stent placement done 2000,2004 and 2005)   [address]      Teeth Extracted In Past    PACEMAKER PLACEMENT  06/04/2010    Med[employer] [address] DR Defibrillator Implanted    TOE AMPUTATION Right 09/12/2017    Rt 3rd toe    TOE AMPUTATION Right 01/09/2018     Right 5th toe amputation and Toenails trimmed left 2,3,4 and 5th toes    VASCULAR SURGERY      per old chart had balloon angioplasty right superfical femoral artery,right popliteal artery,,right ant.tibial artery, right tibioperoneal trunk, and right post.tibial artery wna stent placement right popliteal artery and superfical femoral artery 7/2012       Current Medications:   Current Facility-Administered Medications   Medication Dose Route Frequency Provider Last Rate Last Admin    sodium chloride flush 0.9 % injection 10 mL  10 mL Intravenous 2 times per day Adrian Wyatt MD   10 mL at 12/22/20 0847    sodium chloride flush 0.9 % injection 10 mL  10 mL Intravenous PRN Adrian Wyatt MD        promethazine (PHENERGAN) tablet 12.5 mg  12.5 mg Oral Q6H PRN Adrian Wyatt MD        Or    ondansetron (ZOFRAN) injection 4 mg  4 mg Intravenous Q6H PRN Adrian Wyatt MD        polyethylene glycol (GLYCOLAX) packet 17 g  17 g Oral Daily PRN Adrian Wyatt MD        acetaminophen (TYLENOL) tablet 650 mg  650 mg Oral Q6H PRN Adrian Wyatt MD        Or    acetaminophen (TYLENOL) suppository 650 mg  650 mg Rectal Q6H PRN Adrian Wyatt MD        bumetanide (BUMEX) injection 1 mg  1 mg Intravenous BID Adrian Wyatt MD   1 mg at 12/22/20 0855    losartan (COZAAR) tablet 25 mg  25 mg Oral Daily Adrian Wyatt MD   25 mg at 12/22/20 0846    insulin lispro (HUMALOG) injection vial 0-12 Units  0-12 Units Subcutaneous TID WC Adrian Wyatt MD   4 Units at 12/22/20 1146    insulin lispro (HUMALOG) injection vial 0-6 Units  0-6 Units Subcutaneous Nightly Adrian Wyatt MD   2 Units at 12/21/20 2005    glucose (GLUTOSE) 40 % oral gel 15 g  15 g Oral PRN Adrian Wyatt MD        dextrose 50 % IV solution  12.5 g Intravenous PRN Adrian Wyatt MD        glucagon (rDNA) injection 1 mg  1 mg Intramuscular PRN Adrian Wyatt MD        dextrose 5 % solution  100 mL/hr Intravenous PRN Adrian Wyatt MD        albuterol sulfate  (90 Base) MCG/ACT inhaler 2 puff  2 puff Inhalation Q4H PRN Adrian Wyatt MD        atorvastatin (LIPITOR) tablet 40 mg  40 mg Oral Nightly Adrian Wyatt MD   40 mg at 12/21/20 2007    carvedilol (COREG) tablet 3.125 mg  3.125 mg Oral BID Adrian Wyatt MD   3.125 mg at 12/22/20 0846    clopidogrel (PLAVIX) tablet 75 mg  75 mg Oral Daily Adrian Wyatt MD   75 mg at 12/22/20 0846    doxazosin (CARDURA) tablet 4 mg  4 mg Oral BID Rosalie Hoyos MD   4 mg at 12/22/20 0846    pregabalin (LYRICA) capsule 150 mg  150 mg Oral BID Rosalie Hoyos MD   150 mg at 12/22/20 0846    heparin (porcine) injection 5,000 Units  5,000 Units Subcutaneous 3 times per day Rosalie Hoyos MD   5,000 Units at 12/22/20 0640    metOLazone (ZAROXOLYN) tablet 2.5 mg  2.5 mg Oral BID Constantin Bach MD   2.5 mg at 12/22/20 0375    morphine (PF) injection 2 mg  2 mg Intravenous Q4H PRN Essence Parham MD   2 mg at 12/21/20 1643       Allergies:  Pcn [penicillins] and Fentanyl    Social History:   Social History     Socioeconomic History    Marital status:       Spouse name: None    Number of children: None    Years of education: None    Highest education level: None   Occupational History    None   Social Needs    Financial resource strain: None    Food insecurity     Worry: None     Inability: None    Transportation needs     Medical: None     Non-medical: None   Tobacco Use    Smoking status: Former Smoker     Packs/day: 0.00     Years: 36.00     Pack years: 0.00     Types: Cigars     Start date: 1/1/1980    Smokeless tobacco: Never Used   Substance and Sexual Activity    Alcohol use: No     Frequency: Never    Drug use: Yes     Types: Marijuana    Sexual activity: Never   Lifestyle    Physical activity     Days per week: None     Minutes per session: None    Stress: None   Relationships    Social connections     Talks on phone: None     Gets together: None     Attends [employer] service: None     Active member of club or organization: None     Attends meetings of clubs or organizations: None     Relationship status: None    Intimate partner violence     Fear of current or ex partner: None     Emotionally abused: None     Physically abused: None     Forced sexual activity: None   Other Topics Concern    None   Social History Narrative    None       Family History:   Family History   Problem Relation Age of Onset  Diabetes Mother     Stroke Mother     High Blood Pressure Mother     Vision Loss Mother     Cancer Father         Prostate Cancer    Diabetes Sister     Neuropathy Sister     Other Sister         \"Breathing Problems\"    Heart Disease Sister     Early Death Sister 62        Heart Complications    Cancer Brother         \"Stomach Cancer\"    High Blood Pressure Brother     Diabetes Brother     Heart Disease Brother     High Blood Pressure Brother     Cancer Son         \"Testicle Cancer\"       REVIEW OFSYSTEMS:    Review of Systems   Constitutional: Negative for chills and fever. HENT: Negative for ear pain, mouth sores, sore throat and tinnitus. Eyes: Negative for photophobia, redness and itching. Respiratory: Positive for chest tightness and shortness of breath. Negative for apnea and stridor. Cardiovascular: Negative for chest pain and palpitations. Gastrointestinal: Negative for anal bleeding, constipation and rectal pain. Endocrine: Negative for polydipsia. Genitourinary: Negative for enuresis, flank pain and hematuria. Musculoskeletal: Positive for gait problem and joint swelling. Negative for back pain and myalgias. Skin: Positive for wound. Negative for color change and pallor. Allergic/Immunologic: Negative for environmental allergies. Neurological: Negative for syncope and speech difficulty. Psychiatric/Behavioral: Negative for confusion and hallucinations. PHYSICAL EXAM:  Vitals:    12/21/20 2005 12/21/20 2010 12/22/20 0430 12/22/20 0845   BP: (!) 140/51 (!) 140/51 (!) 133/56 (!) 133/56   Pulse: 74 73 67 86   Resp:  13 20 17   Temp:  98.1 °F (36.7 °C) 98.5 °F (36.9 °C) 97.8 °F (36.6 °C)   TempSrc:  Oral Oral Oral   SpO2:   97% 95%   Weight:   293 lb 14 oz (133.3 kg)    Height:           Physical Exam  Constitutional:       General: He is not in acute distress. Appearance: He is well-developed. HENT:      Head: Normocephalic.    Eyes:      Pupils: Pupils are equal, round, and reactive to light. Neck:      Musculoskeletal: Normal range of motion and neck supple. Cardiovascular:      Rate and Rhythm: Normal rate. Pulmonary:      Effort: Respiratory distress present. Abdominal:      General: There is no distension. Palpations: Abdomen is soft. There is no mass. Tenderness: There is no abdominal tenderness. There is no guarding or rebound. Musculoskeletal: Normal range of motion. Feet:    Skin:     General: Skin is warm. Neurological:      Mental Status: He is alert and oriented to person, place, and time.            DATA:    CBC with Differential:    Lab Results   Component Value Date    WBC 6.5 12/22/2020    RBC 3.30 12/22/2020    HGB 9.4 12/22/2020    HCT 31.8 12/22/2020     12/22/2020    MCV 96.4 12/22/2020    MCH 28.5 12/22/2020    MCHC 29.6 12/22/2020    RDW 14.2 12/22/2020    SEGSPCT 70.2 12/22/2020    LYMPHOPCT 20.6 12/22/2020    MONOPCT 6.7 12/22/2020    EOSPCT 1.5 09/08/2011    BASOPCT 0.3 12/22/2020    MONOSABS 0.4 12/22/2020    LYMPHSABS 1.3 12/22/2020    EOSABS 0.1 12/22/2020    BASOSABS 0.0 12/22/2020    DIFFTYPE AUTOMATED DIFFERENTIAL 12/22/2020     CMP:    Lab Results   Component Value Date     12/22/2020    K 4.8 12/22/2020    K 5.1 01/10/2018     12/22/2020    CO2 29 12/22/2020    BUN 16 12/22/2020    CREATININE 1.8 12/22/2020    GFRAA 45 12/22/2020    LABGLOM 37 12/22/2020    GLUCOSE 148 12/22/2020    PROT 7.2 12/21/2020    PROT 6.3 01/21/2013    LABALBU 3.3 12/21/2020    CALCIUM 7.9 12/22/2020    BILITOT 0.4 12/21/2020    ALKPHOS 73 12/21/2020    AST 11 12/21/2020    ALT 6 12/21/2020       IMPRESSION:        Patient Active Problem List:     Chronic coronary artery disease     Automatic implantable cardioverter-defibrillator in situ     Chronic combined systolic and diastolic heart failure (HCC)     Chronic kidney disease, stage III (moderate)     Mixed hyperlipidemia     Sick sinus syndrome ([facility] [address] Type 2 diabetes mellitus with diabetic polyneuropathy (HCC)     Spinal stenosis of lumbar region     Obesity, Class I, BMI 30-34.9     Postoperative hypertension     S/P partial colectomy     Tubulovillous adenoma of colon     Microalbuminuria     PVD (peripheral vascular disease) ([facility])     Limb ischemia     Necrotic toes (HCC)     Toe gangrene (HCC)     Diabetic foot infection ([facility])     Chronic kidney disease (CKD) stage G3a/A2, moderately decreased glomerular filtration rate (GFR) between 45-59 mL/min/1.73 square meter and albuminuria creatinine ratio between  mg/g     DM (diabetes mellitus) ([address]     Edema     ICD (implantable cardioverter-defibrillator) battery depletion     Hyperkalemia     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[facility])     Ischemia of toe     Acute kidney injury ([facility]     Fluid overload     DM (diabetes mellitus) ([facility] [address]     Hypertensive emergency     Precordial pain     Acute chest pain     Unstable angina ([facility])     Chronic kidney disease (CKD) stage G3a/A3, moderately decreased glomerular filtration rate (GFR) between 45-59 mL/min/1.73 square meter and albuminuria creatinine ratio greater than 300 mg/g     Cardiomyopathy ([facility] [address]     Hypertensive crisis     Diabetic neuropathy ([facility])     HTN (hypertension)     Epigastric pain     Heart failure exacerbated by sotalol ([facility])     Bilateral lower extremity edema     Elevated troponin     Acute on chronic systolic CHF (congestive heart failure) ([facility])      PLAN:  Pt with auto-amputated left great toe  Will consult vascular surgery for evaluation and possible CO2 angio due to worsening kidney function. Pt needs amputation of the toe once vascular anatomy improves otherwise will need BKA.          Mira Jones MD

## 2020-12-22 NOTE — CARE COORDINATION
Received potential referral for ARU. Will review patients clinicals and PT/OT notes.   Thank you for the referral.

## 2020-12-23 LAB
ACTIVATED CLOTTING TIME, LOW RANGE: 177 SEC
ACTIVATED CLOTTING TIME, LOW RANGE: 184 SEC
ACTIVATED CLOTTING TIME, LOW RANGE: 341 SEC
ALBUMIN SERPL-MCNC: 3.1 GM/DL (ref 3.4–5)
ALP BLD-CCNC: 54 IU/L (ref 40–128)
ALT SERPL-CCNC: <5 U/L (ref 10–40)
ANION GAP SERPL CALCULATED.3IONS-SCNC: 8 MMOL/L (ref 4–16)
AST SERPL-CCNC: 9 IU/L (ref 15–37)
BILIRUB SERPL-MCNC: 0.5 MG/DL (ref 0–1)
BUN BLDV-MCNC: 22 MG/DL (ref 6–23)
CALCIUM SERPL-MCNC: 7.8 MG/DL (ref 8.3–10.6)
CHLORIDE BLD-SCNC: 107 MMOL/L (ref 99–110)
CO2: 30 MMOL/L (ref 21–32)
CREAT SERPL-MCNC: 1.9 MG/DL (ref 0.9–1.3)
DOSE AMOUNT: NORMAL
DOSE TIME: NORMAL
GFR AFRICAN AMERICAN: 43 ML/MIN/1.73M2
GFR NON-AFRICAN AMERICAN: 35 ML/MIN/1.73M2
GLUCOSE BLD-MCNC: 141 MG/DL (ref 70–99)
GLUCOSE BLD-MCNC: 223 MG/DL (ref 70–99)
GLUCOSE BLD-MCNC: 87 MG/DL (ref 70–99)
GLUCOSE BLD-MCNC: 95 MG/DL (ref 70–99)
MAGNESIUM: 1.8 MG/DL (ref 1.8–2.4)
PHOSPHORUS: 3.1 MG/DL (ref 2.5–4.9)
POTASSIUM SERPL-SCNC: 4.5 MMOL/L (ref 3.5–5.1)
SODIUM BLD-SCNC: 145 MMOL/L (ref 135–145)
TOTAL PROTEIN: 5.9 GM/DL (ref 6.4–8.2)
VANCOMYCIN RANDOM: 6.2 UG/ML

## 2020-12-23 PROCEDURE — 36415 COLL VENOUS BLD VENIPUNCTURE: CPT

## 2020-12-23 PROCEDURE — C2623 CATH, TRANSLUMIN, DRUG-COAT: HCPCS

## 2020-12-23 PROCEDURE — 99211 OFF/OP EST MAY X REQ PHY/QHP: CPT

## 2020-12-23 PROCEDURE — 99233 SBSQ HOSP IP/OBS HIGH 50: CPT | Performed by: INTERNAL MEDICINE

## 2020-12-23 PROCEDURE — 2580000003 HC RX 258: Performed by: INTERNAL MEDICINE

## 2020-12-23 PROCEDURE — 2500000003 HC RX 250 WO HCPCS: Performed by: INTERNAL MEDICINE

## 2020-12-23 PROCEDURE — C1887 CATHETER, GUIDING: HCPCS

## 2020-12-23 PROCEDURE — 84100 ASSAY OF PHOSPHORUS: CPT

## 2020-12-23 PROCEDURE — 047N3Z1 DILATION OF LEFT POPLITEAL ARTERY USING DRUG-COATED BALLOON, PERCUTANEOUS APPROACH: ICD-10-PCS | Performed by: SURGERY

## 2020-12-23 PROCEDURE — 6360000002 HC RX W HCPCS: Performed by: INTERNAL MEDICINE

## 2020-12-23 PROCEDURE — 6360000004 HC RX CONTRAST MEDICATION

## 2020-12-23 PROCEDURE — 87075 CULTR BACTERIA EXCEPT BLOOD: CPT

## 2020-12-23 PROCEDURE — 82962 GLUCOSE BLOOD TEST: CPT

## 2020-12-23 PROCEDURE — 87070 CULTURE OTHR SPECIMN AEROBIC: CPT

## 2020-12-23 PROCEDURE — 37224 HC FEM POP TERRITORY PLASTY: CPT

## 2020-12-23 PROCEDURE — 2709999900 HC NON-CHARGEABLE SUPPLY

## 2020-12-23 PROCEDURE — C1894 INTRO/SHEATH, NON-LASER: HCPCS

## 2020-12-23 PROCEDURE — 2500000003 HC RX 250 WO HCPCS

## 2020-12-23 PROCEDURE — 87077 CULTURE AEROBIC IDENTIFY: CPT

## 2020-12-23 PROCEDURE — 6370000000 HC RX 637 (ALT 250 FOR IP): Performed by: INTERNAL MEDICINE

## 2020-12-23 PROCEDURE — 2140000000 HC CCU INTERMEDIATE R&B

## 2020-12-23 PROCEDURE — 75710 ARTERY X-RAYS ARM/LEG: CPT

## 2020-12-23 PROCEDURE — 83735 ASSAY OF MAGNESIUM: CPT

## 2020-12-23 PROCEDURE — 85347 COAGULATION TIME ACTIVATED: CPT

## 2020-12-23 PROCEDURE — 047L3Z1 DILATION OF LEFT FEMORAL ARTERY USING DRUG-COATED BALLOON, PERCUTANEOUS APPROACH: ICD-10-PCS | Performed by: SURGERY

## 2020-12-23 PROCEDURE — 6360000002 HC RX W HCPCS

## 2020-12-23 PROCEDURE — 80202 ASSAY OF VANCOMYCIN: CPT

## 2020-12-23 PROCEDURE — 80053 COMPREHEN METABOLIC PANEL: CPT

## 2020-12-23 PROCEDURE — C1725 CATH, TRANSLUMIN NON-LASER: HCPCS

## 2020-12-23 PROCEDURE — C1769 GUIDE WIRE: HCPCS

## 2020-12-23 PROCEDURE — 6370000000 HC RX 637 (ALT 250 FOR IP)

## 2020-12-23 RX ORDER — DIAZEPAM 5 MG/ML
5 INJECTION, SOLUTION INTRAMUSCULAR; INTRAVENOUS ONCE
Status: DISCONTINUED | OUTPATIENT
Start: 2020-12-23 | End: 2020-12-27 | Stop reason: HOSPADM

## 2020-12-23 RX ADMIN — SODIUM CHLORIDE, PRESERVATIVE FREE 10 ML: 5 INJECTION INTRAVENOUS at 21:08

## 2020-12-23 RX ADMIN — BUMETANIDE 1 MG: 0.25 INJECTION INTRAMUSCULAR; INTRAVENOUS at 07:45

## 2020-12-23 RX ADMIN — METRONIDAZOLE 500 MG: 500 INJECTION, SOLUTION INTRAVENOUS at 16:57

## 2020-12-23 RX ADMIN — VANCOMYCIN HYDROCHLORIDE 1750 MG: 1 INJECTION, POWDER, LYOPHILIZED, FOR SOLUTION INTRAVENOUS at 13:12

## 2020-12-23 RX ADMIN — HEPARIN SODIUM 5000 UNITS: 5000 INJECTION INTRAVENOUS; SUBCUTANEOUS at 21:18

## 2020-12-23 RX ADMIN — ATORVASTATIN CALCIUM 40 MG: 40 TABLET, FILM COATED ORAL at 21:07

## 2020-12-23 RX ADMIN — DOXAZOSIN 4 MG: 4 TABLET ORAL at 21:07

## 2020-12-23 RX ADMIN — CARVEDILOL 3.12 MG: 3.12 TABLET, FILM COATED ORAL at 21:07

## 2020-12-23 RX ADMIN — MORPHINE SULFATE 2 MG: 2 INJECTION, SOLUTION INTRAMUSCULAR; INTRAVENOUS at 21:18

## 2020-12-23 RX ADMIN — BUMETANIDE 1 MG: 0.25 INJECTION INTRAMUSCULAR; INTRAVENOUS at 23:06

## 2020-12-23 RX ADMIN — PREGABALIN 150 MG: 75 CAPSULE ORAL at 21:08

## 2020-12-23 RX ADMIN — CEFEPIME HYDROCHLORIDE 2 G: 2 INJECTION, POWDER, FOR SOLUTION INTRAVENOUS at 21:08

## 2020-12-23 RX ADMIN — SODIUM CHLORIDE, PRESERVATIVE FREE 10 ML: 5 INJECTION INTRAVENOUS at 07:44

## 2020-12-23 RX ADMIN — METOLAZONE 2.5 MG: 2.5 TABLET ORAL at 21:10

## 2020-12-23 ASSESSMENT — PAIN SCALES - GENERAL
PAINLEVEL_OUTOF10: 7
PAINLEVEL_OUTOF10: 6

## 2020-12-23 ASSESSMENT — PAIN DESCRIPTION - PAIN TYPE: TYPE: CHRONIC PAIN

## 2020-12-23 ASSESSMENT — PAIN DESCRIPTION - PROGRESSION: CLINICAL_PROGRESSION: GRADUALLY WORSENING

## 2020-12-23 ASSESSMENT — PAIN DESCRIPTION - ORIENTATION: ORIENTATION: LEFT

## 2020-12-23 NOTE — CONSULTS
[facility]               [address], [address]                                  CONSULTATION    PATIENT NAME: Rosana Salinas                       :        1950  MED REC NO:   [id number]                          ROOM:       5809  ACCOUNT NO:   [de-identified]                           ADMIT DATE: 2020  PROVIDER:     Belle Sullivan MD    CONSULT DATE:  2020    CONSULT REQUESTED BY:  Bunny Montano MD    REASON FOR CONSULT:  CKD stage III, will need angiogram of lower  extremity. BRIEF HISTORY:  The patient is a 70-year-old [facility] American male who I  know for a long time. Initially, he presented to the emergency room on  2020 with increasing shortness of breath, leg _____, etc.   Apparently, he was admitted for acute decompensated heart failure not  too long ago in [facility] and saw Dr. Jennie Thompson not too long ago and the  decision was made to do an amputation of his toe versus feet versus  below knee. I did review the ER data. Looks like in the emergency room, his blood  pressure was rather high, which is not unusual for him and he underwent  several diagnostic tests, which include imaging and biochemical.   Imaging study, which includes chest x-ray as well as foot x-ray showed  probably pulmonary edema as well as cardiomegaly. He does have an ICD  by the way and also ulcer and surrounding edema of the first digit of  the toe in the left side. He was subsequently admitted for further  evaluation. Since his admission, he was seen by Dr. Jennie Thompson as well as cardiothoracic  surgeon, Dr. Lawson Garcia, and the decision to do angiogram before the  amputation was made, which will be done tomorrow, hence the Renal  consult was requested. As far as biochemical testing is concerned, his creatinine is 1.8 during  admission. Of course, it can fluctuate due to cardiorenal syndrome type  2. I am of course very familiar with him. I have been knowing him for a  long time. Briefly speaking, his creatinine was 1.1 to 1.2 back in  2010, has slowly increased. Lately, he has been running about 1.7 to  1.9 range. Obviously, it fluctuates due to underlying cardiorenal  syndrome. I saw him last time on 09/21/2020. His creatinine was 1.7,  but he had significant 3+ leg edema, scrotal edema, and his blood  pressure was rather high about 180/80. I adjusted the diuretics and  added some antihypertensive medication to control it. I asked him  whether he is adherent to his blood pressure medication and he said he  is, although sometimes it does not match what I prescribed and what he  is really taking. PAST MEDICAL HISTORY:  1.  CKD stage IIIA/Ba3.  2.  Cardiorenal syndrome type 2, frequently transforming to type 1.  3.  Diabetes mellitus, longstanding, not very well controlled. Did not  have much proteinuria. 4.  Hypertension. Again, not very well controlled. 5.  Coronary artery disease, several stents by Dr. Mekhi Porter. He also had  an ICD placed when his EF was about 45% or so. 6.  Severe atherosclerotic cardiovascular disease involving the lower  extremities. He did have several interventions before. 7.  Chronic leg edema. 8.  Hypertension. 9.  Probable autonomic dysfunction. He had some orthostatic hypotension  before. PAST SURGICAL HISTORY:  1.  Status post toe amputation in the right side. 2.  Several cardiac caths as well as lower extremity angiogram before. I think he had an intervention in 11/2018. 3.  Pacemaker and ICD placement. FAMILY MEDICAL HISTORY:  Coronary artery disease runs in the family. Nobody has advanced kidney disease or is on dialysis. SOCIAL HISTORY:  The patient is . He moved to [place] for  a while, but came back to [facility], residing here in 2016. I think  originally he is from here. He does have extended family.   Lives at home.  Does have some help. I think he has also had some home  healthcare. He has an electric scooter by the way. HABITS:  He does not smoke now. No history of alcohol or illicit drug  abuse. ALLERGIES:  He is listed allergic to two medications, which include  PENICILLIN and _____. CURRENT MEDICATIONS:  Here in the hospital, he is on losartan 25 mg  daily, which I will discontinue because of the angiogram tomorrow  morning. He is also on doxazosin, Plavix, Bumex 1 mg IV twice a day, as  well as metolazone. He is also on pregabalin, atorvastatin. REVIEW OF SYSTEMS:  Obviously, he had some shortness of breath and  dyspnea on exertion. He also has significant scrotal edema and lower  extremity edema, and pain in the leg. No fever, chills, or rigor. Rest  of the review of systems is negative or as in previous paragraph. PHYSICAL EXAMINATION:  VITAL SIGNS:  At the time of examination reveal, his T-max is 98.1,  blood pressure currently is 130s/60s, this is automatic blood pressure,  so probably need a manual confirmation. He is on 2 liters of oxygen at  96%, pulse 61, respiratory rate 16. GENERAL:  The patient is without any acute distress. Head of the bed  elevated about 60 degrees. HEENT:  Head and Neck:  Normocephalic, atraumatic. Eyes: At least 1+  conjunctival pallor. CARDIOVASCULAR:  Seems regular rate and rhythm. RESPIRATORY:  Few crackles at the base. ABDOMEN:  Obese with abdominal wall edema. There is significant scrotal  edema. EXTREMITIES:  Significant edema bilaterally. There is an ulcer in the  left leg, it is cold. LABORATORY VALUES AND ANCILLARY SERVICES:  As mentioned earlier. He has  a little bit of anemia 9.4. BUN and creatinine 16 and 1.8 respectively. IMPRESSION:  A 70-year-old male with CKD stage III, had some acute  decompensated heart failure as well as leg gangrene. 1.  CKD stage IIIAa3, slowly progressive renal disease _____. 2.  Severe atherosclerotic cardiovascular disease involving pretty much  all vascular beds, now comes with toe and foot gangrene. 3.  Underlying cardiomyopathy, diabetes, and hypertension to name a few. 4.  Anemia probably multifactorial.  5.  Fluid overload with some scrotal edema. PLAN:  1.  Okay with angiogram of course. I have discussed with the patient  about the potential contrast associated with acute kidney injury, but we  both agreed that his benefits outweigh the risks. 2.  I would continue the diuretics for now. We will hold the ARB  therapy for now. We will manually check the blood pressure and  discontinue any nonessential medication; otherwise, follow clinically.         Sander Montemayor MD    D: 12/22/2020 17:46:30       T: 12/22/2020 19:44:11     URSULA/ROSA [email or web address]  Job#: [id number]     Doc#: [id number]    CC:

## 2020-12-23 NOTE — PROGRESS NOTES
Liss Barrios MD, [address]                Internal Medicine Hospitalist             Daily Progress  Note   Subjective:     Chief Complaint   Patient presents with    Shortness of Breath     Worsening tonight, worse with movement    Groin Swelling     x2wks    Wound Infection     Left foot amputation per Dr. Roxie Carter. Mr. Jossue Lucas of [place] just came back from having an angiogram    Objective:    BP (!) 145/76   Pulse 60   Temp 97.6 °F (36.4 °C) (Oral)   Resp 19   Ht 6' (1.829 m)   Wt 292 lb 12.3 oz (132.8 kg)   SpO2 97%   BMI 39.71 kg/m²      Intake/Output Summary (Last 24 hours) at 12/23/2020 1622  Last data filed at 12/23/2020 0949  Gross per 24 hour   Intake 10 ml   Output 2675 ml   Net -2665 ml      Physical Exam:  Heart:  Distant heart sounds. Lungs: Mostly clear to auscultation, decreased breath sounds at bases. No wheezes appreciated no crackles heard. Moderate effort. Abdomen: Soft, non distended. Bowel sounds appreciated. No obvious liver or spleen enlargement. Non tender, no rebound noted. Extremities: Left foot bandaged no exam.   CNS: Grossly intact. Can move all 4.      Labs:  CBC with Differential:    Lab Results   Component Value Date    WBC 6.5 12/22/2020    RBC 3.30 12/22/2020    HGB 9.4 12/22/2020    HCT 31.8 12/22/2020     12/22/2020    MCV 96.4 12/22/2020    MCH 28.5 12/22/2020    MCHC 29.6 12/22/2020    RDW 14.2 12/22/2020    SEGSPCT 70.2 12/22/2020    LYMPHOPCT 20.6 12/22/2020    MONOPCT 6.7 12/22/2020    EOSPCT 1.5 09/08/2011    BASOPCT 0.3 12/22/2020    MONOSABS 0.4 12/22/2020    LYMPHSABS 1.3 12/22/2020    EOSABS 0.1 12/22/2020    BASOSABS 0.0 12/22/2020    DIFFTYPE AUTOMATED DIFFERENTIAL 12/22/2020     CMP:    Lab Results   Component Value Date     12/23/2020    K 4.5 12/23/2020    K 5.1 01/10/2018     12/23/2020    CO2 30 12/23/2020    BUN 22 12/23/2020    CREATININE 1.9 12/23/2020    GFRAA 43 12/23/2020    LABGLOM 35 12/23/2020    GLUCOSE 87 12/23/2020    PROT 5.9 12/23/2020    PROT 6.3 01/21/2013    LABALBU 3.1 12/23/2020    CALCIUM 7.8 12/23/2020    BILITOT 0.5 12/23/2020    ALKPHOS 54 12/23/2020    AST 9 12/23/2020    ALT <5 12/23/2020     Recent Labs     12/21/20  0017   TROPONINT 0.024*     Lab Results   Component Value Date    TSHHS 0.872 10/23/2020         dextrose        cefepime  2 g Intravenous Q12H    metroNIDAZOLE  500 mg Intravenous Q8H    diazePAM  5 mg Intravenous Once    sodium chloride flush  10 mL Intravenous 2 times per day    bumetanide  1 mg Intravenous BID    insulin lispro  0-12 Units Subcutaneous TID WC    insulin lispro  0-6 Units Subcutaneous Nightly    atorvastatin  40 mg Oral Nightly    carvedilol  3.125 mg Oral BID    clopidogrel  75 mg Oral Daily    doxazosin  4 mg Oral BID    pregabalin  150 mg Oral BID    heparin (porcine)  5,000 Units Subcutaneous 3 times per day    metOLazone  2.5 mg Oral BID         Assessment:       Patient Active Problem List    Diagnosis Date Noted    Precordial pain 07/18/2018     Priority: High    Acute chest pain      Priority: High    Acute on chronic systolic CHF (congestive heart failure) ([facility] [address] 12/21/2020    Bilateral lower extremity edema     Elevated troponin     Heart failure exacerbated by sotalol ([facility] [address] 12/10/2020    Epigastric pain 08/12/2020    HTN (hypertension) 05/20/2019    Diabetic neuropathy ([facility] [address] 05/02/2019    Hypertensive crisis 03/11/2019    Chronic kidney disease (CKD) stage G3a/A3, moderately decreased glomerular filtration rate (GFR) between 45-59 mL/min/1.73 square meter and albuminuria creatinine ratio greater than 300 mg/g 01/29/2019    Cardiomyopathy ([facility] [address] 01/29/2019    Unstable angina ([facility] [address] 11/19/2018    Hypertensive emergency 07/17/2018    Acute kidney injury ([facility] [address] 02/09/2018    Fluid overload 02/09/2018    DM (diabetes mellitus) ([facility] [address] 02/09/2018    Ischemia of toe     Hyperkalemia 01/09/2018    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[facility] [address]     ICD (implantable cardioverter-defibrillator) battery depletion 10/11/2017    Chronic kidney disease (CKD) stage G3a/A2, moderately decreased glomerular filtration rate (GFR) between 45-59 mL/min/1.73 square meter and albuminuria creatinine ratio between  mg/g 10/06/2017    DM (diabetes mellitus) ([facility] [address] 10/06/2017    Edema 10/06/2017    Diabetic foot infection ([facility] [address]     Toe gangrene ([facility] [address] 07/26/2017    Necrotic toes ([facility] [address] 07/06/2017    PVD (peripheral vascular disease) ([facility] [address]     Limb ischemia     Microalbuminuria 01/22/2017    Tubulovillous adenoma of colon 10/20/2016    Postoperative hypertension 08/27/2016    S/P partial colectomy 08/27/2016    Chronic coronary artery disease 05/31/2016    Chronic kidney disease, stage III (moderate) 05/31/2016    Sick sinus syndrome ([facility] [address] 05/31/2016    [facility] diabetes mellitus [facility]uropathy [facility] 05/31/2016    Spinal stenosis of lumbar region 05/31/2016    Obesity, Class I, BMI 30-34.9 05/31/2016    Chronic combined systolic and diastolic heart failure ([facility] [address] 03/03/2016    Automatic implantable cardioverter-defibrillator in situ 09/03/2015    Mixed hyperlipidemia 04/13/2015       Plan:     Problems being addressed this admission:   Acute on chronic systolic CHF:  71/91/81--[id number] complaining of shortness of breath, has lower extremity swelling, scrotal swelling, mildly elevated proBNP. -Patient admits to not having good urine output after taking diuretics at home.   Patient is on torsemide 100 mg twice daily, metolazone daily-Patient received IV Lasix 40 mg in the ED.-We will continue with IV Bumex as he responded well in the recent admission.-Strict input, output, daily weight.-Appreciate cardiology consult-please see note - continue GDMT -Peripheral angiogram by Dr. Boni Triplett to be done tomorrow for PAD sheeba for limb salvage   12/23/20- no chest pains no sob noted continue to monitor.      Acute hypoxic respiratory failure: Possibly secondary to fluid overload from CHF exacerbation  12/22/20--Patient was saturating 85% on room air at presentation.  Was saturating 90% during my evaluation.-Wean off of oxygen as tolerated. .  12/23/20- stable      Peripheral vascular disease with gangrenous changes to the left great toe:  12/22/20--Per Dr. Gigi Fitzpatrick -Pt with auto-amputated left great toeWill consult vascular surgery for evaluation and possible CO2 angio due to worsening kidney function. Pt needs amputation of the toe once vascular anatomy improves otherwise will need BKA. -Patient received vancomycin, ceftriaxone in the ER-Arterial Dopplers-12/11/2020- demonstrated dampened waveforms left lower extremity with high-grade stenosis involving the right distal superficial femoral artery and proximal popliteal artery.  -Patient currently not on antibiotics, will trend CRP and procalcitonin cefepime, vancomycin, and Flagyl. Infectious disease is not available this week. -Obtain wound culture Diabetic foot ulcer on the plantar aspect of left foot  12/23/20- please see detailed angio note.  Further recommendation per cardio and surgery.      Hypertension with accelerated hypertension at admission  12/22/20--Resume home medications-carvedilol and doxazosin, however, losartan was discontinued for now per nephrology  12/23/20- b/p is 145/76 today.     Diabetes mellitus type 2, on chronic insulin  12/22/20-HbA1c 6.6-12/10/2020 -Patient is currently on insulin sliding scale.  Was taken off of long-acting insulin recently.-Continue insulin sliding scale with hypoglycemia protocol  12/23/20- sugar is 141 today.       Other chronic problems as of 12/22/20  #. Coronary artery disease s/p PCI and stenting-2018  -Continue Plavix, carvedilol, statin     #. Obstructive sleep apnea-not compliant with BiPAP/CPAP     #. Chronic kidney disease stage III     #. Hyperlipidemia-continue atorvastatin     Consultants:  Nephrology  Cardiology  Vascular surgery  gen surgery    General Orders:  Repeat basic labs again in am.  I have explained to the patient and discussed with him/her the treatment plan.   Latricia Lang MD, [address]

## 2020-12-23 NOTE — CONSULTS
Department of Cardiovascular & Thoracic Surgery   Consult Note    Reason for Consult:  PAD    Admitting Physician: Dr. Ricky Desai    Date of Consult: 12/23/20      History Obtained From:  patient     HISTORY OF PRESENT ILLNESS:    The patient is a 79 y.o. male who presents with left foot wound. He has seen Dr. Ariel De La Cruz as outpatient, who had referred him for further vascular evaluation. Patient was seen in the office and hospital admission had been recommended at that time. He unfortunately did not go to the hospital then, but did present yesterday. Of note, he has CHF and CKD as well. Consult requested for evaluation of PAD. Past Medical History:        Diagnosis Date    Acid reflux     Acute MI ([address] 2004, 2008    Arthritis     Back    Broken teeth     Upper Front    CAD (coronary artery disease)     Sees Dr. Yani Ramos [facility])     per old chart    CHF (congestive heart failure) ([facility] [address]     Chronic back pain     Chronic kidney disease     STAGE 3 KIDNEY FAILURE- \"from my diabetes- do not follow with any one- have seen Dr Clive Gibbons in the past\"    Diabetes mellitus ([facility] [address] Dx 1965    per old chart pt has been diabetic since age 13    Diabetic neuropathy ([facility] [address]     \"in my feet\"    H/O cardiovascular stress test 08/25/2016    H/O Doppler ultrasound 09/27/2018    Moderate disease of the right lower extremity with an JALEN of 0.72.   Moderate to severe disease of the left lower extremity with an JALEN of 0.55.   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    Neuropathy     both feet    neuropathy     PAD (peripheral artery disease) ([facility] [address] 09/27/2018    PVD (peripheral vascular disease) ([facility] [address]     Sick sinus syndrome ([facility] [address]     Sleep apnea     \"sleep study 3 yrs ago- could not tolerate the cpap made me too dry\"    Spinal stenosis     Teeth missing     Upper And Lower    Type 2 diabetes mellitus without complication [facility])      Past Surgical History:        Procedure Laterality Date    CARDIAC CATHETERIZATION      per old chart done 10/2014    CARDIAC CATHETERIZATION  07/14/2017    with angiography of leg    CARDIAC CATHETERIZATION  11/20/2018    PATENT STENTS OF ALL THREE MAJOR VESSELS    CARDIAC DEFIBRILLATOR PLACEMENT  06/04/2010    [employer] [address] Defibrillator Implanted    COLECTOMY Right 08/26/2016    laparascopic; robotic assisted    COLONOSCOPY  08/04/2016    CORONARY ANGIOPLASTY      \"15 Heart Stents\"    CORONARY ANGIOPLASTY WITH STENT PLACEMENT      per old chart had angio with stent to circumflex and obtuse marginal artery at [facility] 5/2010( old chart also gives hx of stent placement done 2000,2004 and 2005)   [address]      Teeth Extracted In Past    PACEMAKER PLACEMENT  06/04/2010    [employer] [address] Defibrillator Implanted    TOE AMPUTATION Right 09/12/2017    Rt 3rd toe    TOE AMPUTATION Right 01/09/2018     Right 5th toe amputation and Toenails trimmed left 2,3,4 and 5th toes    VASCULAR SURGERY      per old chart had balloon angioplasty right superfical femoral artery,right popliteal artery,,right ant.tibial artery, right tibioperoneal trunk, and right post.tibial artery wna stent placement right popliteal artery and superfical femoral artery 7/2012     Current Medications:   Current Facility-Administered Medications: cefepime (MAXIPIME) 2 g IVPB minibag, 2 g, Intravenous, Q12H  metronidazole (FLAGYL) 500 mg in NaCl 100 mL IVPB premix, 500 mg, Intravenous, Q8H  sodium chloride flush 0.9 % injection 10 mL, 10 mL, Intravenous, 2 times per day  sodium chloride flush 0.9 % injection 10 mL, 10 mL, Intravenous, PRN  promethazine (PHENERGAN) tablet 12.5 mg, 12.5 mg, Oral, Q6H PRN **OR** ondansetron (ZOFRAN) injection 4 mg, 4 mg, Intravenous, Q6H PRN  polyethylene glycol (GLYCOLAX) packet 17 g, 17 g, Oral, Daily PRN  acetaminophen (TYLENOL) tablet 650 mg, 650 mg, Oral, Q6H PRN **OR** acetaminophen (TYLENOL) suppository 650 mg, 650 mg, Rectal, Q6H PRN  bumetanide (BUMEX) injection 1 mg, 1 mg, Intravenous, BID  insulin lispro (HUMALOG) injection vial 0-12 Units, 0-12 Units, Subcutaneous, TID WC  insulin lispro (HUMALOG) injection vial 0-6 Units, 0-6 Units, Subcutaneous, Nightly  glucose (GLUTOSE) 40 % oral gel 15 g, 15 g, Oral, PRN  dextrose 50 % IV solution, 12.5 g, Intravenous, PRN  glucagon (rDNA) injection 1 mg, 1 mg, Intramuscular, PRN  dextrose 5 % solution, 100 mL/hr, Intravenous, PRN  albuterol sulfate  (90 Base) MCG/ACT inhaler 2 puff, 2 puff, Inhalation, Q4H PRN  atorvastatin (LIPITOR) tablet 40 mg, 40 mg, Oral, Nightly  carvedilol (COREG) tablet 3.125 mg, 3.125 mg, Oral, BID  clopidogrel (PLAVIX) tablet 75 mg, 75 mg, Oral, Daily  doxazosin (CARDURA) tablet 4 mg, 4 mg, Oral, BID  pregabalin (LYRICA) capsule 150 mg, 150 mg, Oral, BID  heparin (porcine) injection 5,000 Units, 5,000 Units, Subcutaneous, 3 times per day  metOLazone (ZAROXOLYN) tablet 2.5 mg, 2.5 mg, Oral, BID  morphine (PF) injection 2 mg, 2 mg, Intravenous, Q4H PRN  Allergies: Allergies   Allergen Reactions    Pcn [Penicillins] Hives    Fentanyl Itching       Social History:   Social History     Socioeconomic History    Marital status:       Spouse name: Not on file    Number of children: Not on file    Years of education: Not on file    Highest education level: Not on file   Occupational History    Not on file   Social Needs    Financial resource strain: Not on file    Food insecurity     Worry: Not on file     Inability: Not on file    Transportation needs     Medical: Not on file     Non-medical: Not on file Tobacco Use    Smoking status: Former Smoker     Packs/day: 0.00     Years: 36.00     Pack years: 0.00     Types: Cigars     Start date: 1/1/1980    Smokeless tobacco: Never Used   Substance and Sexual Activity    Alcohol use: No     Frequency: Never    Drug use: Yes     Types: Marijuana    Sexual activity: Never   Lifestyle    Physical activity     Days per week: Not on file     Minutes per session: Not on file    Stress: Not on file   Relationships    Social connections     Talks on phone: Not on file     Gets together: Not on file     Attends Voodoo service: Not on file     Active member of club or organization: Not on file     Attends meetings of clubs or organizations: Not on file     Relationship status: Not on file    Intimate partner violence     Fear of current or ex partner: Not on file     Emotionally abused: Not on file     Physically abused: Not on file     Forced sexual activity: Not on file   Other Topics Concern    Not on file   Social History Narrative    Not on file       Family History:        Problem Relation Age of Onset    Diabetes Mother     Stroke Mother     High Blood Pressure Mother     Vision Loss Mother     Cancer Father         Prostate Cancer    Diabetes Sister     Neuropathy Sister     Other Sister         \"Breathing Problems\"    Heart Disease Sister     Early Death Sister 62        Heart Complications    Cancer Brother         \"Stomach Cancer\"    High Blood Pressure Brother     Diabetes Brother     Heart Disease Brother     High Blood Pressure Brother     Cancer Son         \"Testicle Cancer\"       REVIEW OF SYSTEMS:  ROS: Is as noted above in HPI. Complete system review is done and is negative except as noted above. EXAM:  Blood pressure (!) 148/67, pulse 64, temperature 97.6 °F (36.4 °C), temperature source Oral, resp. rate 15, height 6' (1.829 m), weight 292 lb 12.3 oz (132.8 kg), SpO2 97 %.   General appearance: No apparent distress, appears stated age and cooperative. Skin: unremarkable, dry,    HEENT Normocephalic, atraumatic without obvious deformity. EOMI, conjunctiva normal, hearing intact  Neck: Supple, Trachea midline   Lungs: Good respiratory effort.  Clear to auscultation,  Heart: Regular rate/ rhythm   Abdomen: Soft, non-tender or non-distended   Extremities:  edema warm, no cyanosis, L foot wound noted  Neurologic: Alert, grossly intact, follows commands  Mental status: normal affect    DATA:  Duplex imaging was limited due to body habitus    IMPRESSION  Patient Active Problem List   Diagnosis    Chronic coronary artery disease    Automatic implantable cardioverter-defibrillator in situ    Chronic combined systolic and diastolic heart failure (HCC)    Chronic kidney disease, stage III (moderate)    Mixed hyperlipidemia    Sick sinus syndrome (HCC)    Type 2 diabetes mellitus with diabetic polyneuropathy ([facility]    Spinal stenosis of lumbar region    Obesity, Class I, BMI 30-34.9    Postoperative hypertension    S/P partial colectomy    Tubulovillous adenoma of colon    Microalbuminuria    PVD (peripheral vascular disease) ([facility]    Limb ischemia    Necrotic toes ([facility]    Toe gangrene ([facility]    Diabetic foot infection (HCC)    Chronic kidney disease (CKD) stage G3a/A2, moderately decreased glomerular filtration rate (GFR) between 45-59 mL/min/1.73 square meter and albuminuria creatinine ratio between  mg/g    DM (diabetes mellitus) (HCC)    Edema    ICD (implantable cardioverter-defibrillator) battery depletion    Hyperkalemia    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HCC)    Ischemia of toe    Acute kidney injury ([facility]    Fluid overload    DM (diabetes mellitus) ([facility] [address]    Hypertensive emergency    Precordial pain    Acute chest pain    Unstable angina (HCC)    Chronic kidney disease (CKD) stage G3a/A3, moderately decreased glomerular filtration rate (GFR) between 45-59 mL/min/1.73 square meter and albuminuria creatinine ratio greater than 300 mg/g    Cardiomyopathy ([facility] [address]    Hypertensive crisis    Diabetic neuropathy ([facility] [address]    HTN (hypertension)    Epigastric pain    Heart failure exacerbated by sotalol (HCC)    Bilateral lower extremity edema    Elevated troponin    Acute on chronic systolic CHF (congestive heart failure) (HCC)       PAD    RECOMMENDATIONS:    Discussed with Dr. Talha Franklin. Requested nephrology clearance due to his CKD. Nephrology gave clearance. Pt understands risk of NANCY. Also notified cardiology. Discussed angiogram, possible intervention at length including risks, benefits, and alternatives of the procedures. Will plan to use CO2 to minimize contrast use. All questions were answered. Patient verbalizes understanding and consents to procedure. The patient was counseled at length about the risks of ahmet Covid-19 during their perioperative period and any recovery window from their procedure. The patient was made aware that ahmet Covid-19  may worsen their prognosis for recovering from their procedure  and lend to a higher morbidity and/or mortality risk. All material risks, benefits, and reasonable alternatives including postponing the procedure were discussed. The patient does wish to proceed with the procedure at this time.           Rosemary Thomas MD

## 2020-12-23 NOTE — PROGRESS NOTES
Patient brought into [facility] to await PV procedure per Dr. Rene Broussard request. Patient alert and oriented and vitals stable. Call light within reach.

## 2020-12-23 NOTE — CONSULTS
[address] [address] Continence Nurse  Consult Note       Reginaldo Godfrey  AGE: 79 y.o. GENDER: male  : 1950  TODAY'S DATE:  2020    Subjective:     Reason for  Evaluation and Assessment: wound care brittany Godfrey is a 79 y.o. male referred by:   [x] Physician  [] Nursing  [] Other:     Wound Identification:  Wound Type: arterial and diabetic  Contributing Factors: diabetes and arterial insufficiency        PAST MEDICAL HISTORY        Diagnosis Date    Acid reflux     Acute MI ([facility] ,     Arthritis     Back    Broken teeth     Upper Front    CAD (coronary artery disease)     Sees Dr. Magaly Sahni [facility])     per old chart    CH[facility]congestive heart failure) ([facility])     Chronic back pain     Chronic kidney disease     STAGE 3 KIDNEY FAILURE- \"from my diabetes- do not follow with any one- have seen Dr Elizabeth Smith in the past\"    Diabetes mellitus ([facility] Dx 1965    per old chart pt has been diabetic since age 13    Diabetic neuropathy ([facility]     \"in my feet\"    H/O cardiovascular stress test 2016    H/O Doppler ultrasound 2018    Moderate disease of the right lower extremity with an JALEN of 0.72.   Moderate to severe disease of the left lower extremity with an JALEN of 0.55.   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    Neuropathy     both feet    neuropathy     PAD (peripheral artery disease) ([facility] 2018    PVD (peripheral vascular disease) ([facility]     Sick sinus syndrome (HCC)     Sleep apnea     \"sleep study 3 yrs ago- could not tolerate the cpap made me too dry\"    Spinal stenosis     Teeth missing     Upper And Lower    Type 2 diabetes mellitus without complication ([facility] [address]        PAST SURGICAL HISTORY    Past Surgical History:   Procedure Laterality Date    CARDIAC CATHETERIZATION      per old chart done 10/2014    CARDIAC CATHETERIZATION  07/14/2017    with angiography of leg    CARDIAC CATHETERIZATION  11/20/2018    PATENT STENTS OF ALL THREE MAJOR VESSELS    CARDIAC DEFIBRILLATOR PLACEMENT  06/04/2010    [employer] [address] [employer] Defibrillator Implanted    COLECTOMY Right 08/26/2016    laparascopic; robotic assisted    COLONOSCOPY  08/04/2016    CORONARY ANGIOPLASTY      \"15 Heart Stents\"    CORONARY ANGIOPLASTY WITH STENT PLACEMENT      per old chart had angio with stent to circumflex and obtuse marginal artery at [facility] 5/2010( old chart also gives hx of stent placement done 2000,2004 and 2005)   [address]      Teeth Extracted In Past    PACEMAKER PLACEMENT  06/04/2010    [employer] [address] Defibrillator Implanted    TOE AMPUTATION Right 09/12/2017    Rt 3rd toe    TOE AMPUTATION Right 01/09/2018     Right 5th toe amputation and Toenails trimmed left 2,3,4 and 5th toes    VASCULAR SURGERY      per old chart had balloon angioplasty right superfical femoral artery,right popliteal artery,,right ant.tibial artery, right tibioperoneal trunk, and right post.tibial artery wna stent placement right popliteal artery and superfical femoral artery 7/2012       FAMILY HISTORY    Family History   Problem Relation Age of Onset    Diabetes Mother     Stroke Mother     High Blood Pressure Mother     Vision Loss Mother     Cancer Father         Prostate Cancer    Diabetes Sister     Neuropathy Sister     Other Sister         \"Breathing Problems\"    Heart Disease Sister     Early Death Sister 62        Heart Complications    Cancer Brother         \"Stomach Cancer\"    High Blood Pressure Brother     Diabetes Brother     Heart Disease Brother     High Blood Pressure Brother     Cancer Son         \"Testicle Cancer\"       SOCIAL HISTORY    Social History     Tobacco Use    Smoking status: Former Smoker     Packs/day: 0.00     Years: 36.00     Pack years: 0.00     Types: Cigars     Start date: 1/1/1980    Smokeless tobacco: Never Used   Substance Use Topics    Alcohol use: No     Frequency: Never    Drug use: Yes     Types: Marijuana       ALLERGIES    Allergies   Allergen Reactions    Pcn [Penicillins] Hives    Fentanyl Itching       MEDICATIONS    No current facility-administered medications on file prior to encounter.       Current Outpatient Medications on File Prior to Encounter   Medication Sig Dispense Refill    pregabalin (LYRICA) 150 MG capsule TAKE 1 CAPSULE BY MOUTH THREE TIMES A DAY      doxazosin (CARDURA) 2 MG tablet Take 2 tablets by mouth 2 times daily 90 tablet 3    torsemide (DEMADEX) 100 MG tablet TAKE 1 TABLET BY MOUTH TWICE A DAY 60 tablet 3    insulin lispro (HUMALOG) 100 UNIT/ML injection vial Inject 0-6 Units into the skin 3 times daily (with meals) 1 vial 3    atorvastatin (LIPITOR) 40 MG tablet Take 1 tablet by mouth nightly 30 tablet 3    losartan (COZAAR) 25 MG tablet Take 1 tablet by mouth daily 30 tablet 3    carvedilol (COREG) 3.125 MG tablet Take 1 tablet by mouth 2 times daily 60 tablet 3    dicyclomine (BENTYL) 10 MG capsule Take 1 capsule by mouth 4 times daily (before meals and nightly) 120 capsule 3    metOLazone (ZAROXOLYN) 5 MG tablet Take 5 mg by mouth daily      albuterol sulfate HFA (VENTOLIN HFA) 108 (90 Base) MCG/ACT inhaler Inhale 2 puffs into the lungs every 4 hours as needed for Wheezing 1 Inhaler 3    clopidogrel (PLAVIX) 75 MG tablet Take 1 tablet by mouth daily 30 tablet 11    acetaminophen (TYLENOL) 325 MG tablet Take 2 tablets by mouth every 6 hours as needed for Pain 120 tablet 3    Calcium Alginate (ALGICELL CALCIUM DRESSING 4\"X8) MISC Apply 1 Device topically daily 30 each 3    PROMOGRAN COREY WOUND DRESSING MATRIX Apply topically Apply to left daily and cover with gauze 1 Package 3         Objective:      BP (!) 148/67   Pulse 64   Temp 97.6 °F (36.4 °C) (Oral)   Resp 15   Ht 6' (1.829 m)   Wt 292 lb 12.3 oz (132.8 kg)   SpO2 97%   BMI 39.71 kg/m²   Pablo Risk Score: Pablo Scale Score: 20    LABS    CBC:   Lab Results   Component Value Date    WBC 6.5 12/22/2020    RBC 3.30 12/22/2020    HGB 9.4 12/22/2020    HCT 31.8 12/22/2020    MCV 96.4 12/22/2020    MCH 28.5 12/22/2020    MCHC 29.6 12/22/2020    RDW 14.2 12/22/2020     12/22/2020    MPV 10.4 12/22/2020     CMP:    Lab Results   Component Value Date     12/23/2020    K 4.5 12/23/2020    K 5.1 01/10/2018     12/23/2020    CO2 30 12/23/2020    BUN 22 12/23/2020    CREATININE 1.9 12/23/2020    GFRAA 43 12/23/2020    LABGLOM 35 12/23/2020    GLUCOSE 87 12/23/2020    PROT 5.9 12/23/2020    PROT 6.3 01/21/2013    LABALBU 3.1 12/23/2020    CALCIUM 7.8 12/23/2020    BILITOT 0.5 12/23/2020    ALKPHOS 54 12/23/2020    AST 9 12/23/2020    ALT <5 12/23/2020     Albumin:    Lab Results   Component Value Date    LABALBU 3.1 12/23/2020     PT/INR:    Lab Results   Component Value Date    PROTIME 11.7 11/19/2018    PROTIME 11.2 09/08/2011    INR 1.03 11/19/2018     HgBA1c:    Lab Results   Component Value Date    LABA1C 6.6 12/10/2020         Assessment:     Patient Active Problem List   Diagnosis    Chronic coronary artery disease    Automatic implantable cardioverter-defibrillator in situ    Chronic combined systolic and diastolic heart failure (HCC)    Chronic kidney disease, stage III (moderate)    Mixed hyperlipidemia    Sick sinus syndrome (HCC)    Type 2 diabetes mellitus with diabetic polyneuropathy ([facility]    Spinal stenosis of lumbar region    Obesity, Class I, BMI 30-34.9    Postoperative hypertension    S/P partial colectomy    Tubulovillous adenoma of colon    Microalbuminuria    PVD (peripheral vascular disease) ([facility]  Limb ischemia    Necrotic toes (HCC)    Toe gangrene (HCC)    Diabetic foot infection (HCC)    Chronic kidney disease (CKD) stage G3a/A2, moderately decreased glomerular filtration rate (GFR) between 45-59 mL/min/1.73 square meter and albuminuria creatinine ratio between  mg/g    DM (diabetes mellitus) (HCC)    Edema    ICD (implantable cardioverter-defibrillator) battery depletion    Hyperkalemia    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[facility])    Ischemia of toe    Acute kidney injury ([facility]    Fluid overload    DM (diabetes mellitus) ([facility]    Hypertensive emergency    Precordial pain    Acute chest pain    Unstable angina (HCC)    Chronic kidney disease (CKD) stage G3a/A3, moderately decreased glomerular filtration rate (GFR) between 45-59 mL/min/1.73 square meter and albuminuria creatinine ratio greater than 300 mg/g    Cardiomyopathy ([facility]    Hypertensive crisis    Diabetic neuropathy (HCC)    HTN (hypertension)    Epigastric pain    Heart failure exacerbated by sotalol (HCC)    Bilateral lower extremity edema    Elevated troponin    Acute on chronic systolic CHF (congestive heart failure) ([facility])       Measurements:  Wound 07/06/17 Other (Comment) Toe (Comment  which one) (Active)   Number of days: 1265       Incision 01/09/18 Foot Right (Active)   Number of days: 1079       Wound 12/10/20 Left great toe  (Active)   Wound Etiology Diabetic 12/23/20 1539   Dressing Status New dressing applied 12/23/20 1539   Wound Cleansed Cleansed with saline 12/23/20 1539   Dressing/Treatment Betadine swabs/povidone iodine 12/23/20 1539   Wound Length (cm) 2.4 cm 12/23/20 1539   Wound Width (cm) 2.5 cm 12/23/20 1539   Wound Depth (cm) 0.2 cm 12/23/20 1539   Wound Surface Area (cm^2) 6 cm^2 12/23/20 1539   Change in Wound Size % (l*w) -50 12/23/20 1539   Wound Volume (cm^3) 1.2 cm^3 12/23/20 1539   Wound Healing % -200 12/23/20 1539   Distance Tunneling (cm) 0 cm 12/23/20 1539   Tunneling Position ___ O'Clock 0 12/23/20 (cm) 4.5 cm 12/23/20 1539   Wound Width (cm) 5 cm 12/23/20 1539   Wound Depth (cm) 0 cm 12/23/20 1539   Wound Surface Area (cm^2) 22.5 cm^2 12/23/20 1539   Change in Wound Size % (l*w) 77.5 12/23/20 1539   Wound Volume (cm^3) 0 cm^3 12/23/20 1539   Distance Tunneling (cm) 0 cm 12/23/20 1539   Tunneling Position ___ O'Clock 0 12/23/20 1539   Undermining Starts ___ O'Clock 0 12/23/20 1539   Undermining Ends___ O'Clock 0 12/23/20 1539   Undermining Maxium Distance (cm) 0 12/23/20 1539   Wound Assessment Pink/red 12/23/20 0015   Drainage Amount Small 12/23/20 1539   Drainage Description Serosanguinous 12/23/20 1539   Odor None 12/23/20 1539   Dina-wound Assessment Dry/flaky 12/23/20 1539   Margins Attached edges 12/23/20 1539   Number of days: 12       Response to treatment:  Well tolerated by patient. Pain Assessment:  Severity:  0  Quality of pain: none  Wound Pain Timing/Severity:   Premedicated:     Plan:     Plan of Care: Wound 12/10/20 Left great toe -Dressing/Treatment: Betadine swabs/povidone iodine(abd kerlix tape )  Wound 12/10/20 Foot Left;Plantar-Dressing/Treatment: Betadine swabs/povidone iodine(abd kerlix tape )  Wound 12/10/20 Foot Left;Lateral fluid filled blister-Dressing/Treatment: (abd kerlix tape )     Pt in bed agreeable to wound care. Pt has chronic wound to left great toe and plantar etiology diabetic/arterial. Toe wound appears to have bone exposure. Pt also has blister to left lateral foot. Wounds cleansed with NS measured and pictured. Pt see's Dr Yvrose Albrecht for his foot wounds. Keep wounds dry dressings applied as above. Rt foot intact. Pt denies wounds to buttocks. Unable to assess buttocks due to s/p angiogram. Pt is generally not at risk for skin breakdown AEB brett score.      Specialty Bed Required : no  [] Low Air Loss   [] Pressure Redistribution  [] Fluid Immersion  [] Bariatric  [] Total Pressure Relief  [] Other:     Discharge Plan:  Placement for patient upon discharge:  tbd  Hospice

## 2020-12-23 NOTE — PROGRESS NOTES
Hospitalist Progress Note      Name:  Renato Mayers /Age/Sex: 1950  (79 y.o. male)   MRN & CSN:  [id number] & [id number] Admission Date/Time: 2020 11:39 PM   Location:  301/3015-A PCP: Brittney Madera Day: 3      Assessment and Plan:       -Peripheral angiogram by Dr. Rodriguez Fuel to be done tomorrow for PAD sheeba for limb salvage       #. Acute on chronic systolic CHF:  -Patient complaining of shortness of breath, has lower extremity swelling, scrotal swelling, mildly elevated proBNP. -Patient admits to not having good urine output after taking diuretics at home.  -Patient is on torsemide 100 mg twice daily, metolazone daily  -Patient received IV Lasix 40 mg in the ED.  -We will continue with IV Bumex as he responded well in the recent admission.  -Strict input, output, daily weight.  -Appreciate cardiology consult-please see note - continue GDMT     #. Acute hypoxic respiratory failure: Possibly secondary to fluid overload from CHF exacerbation  -Patient was saturating 85% on room air at presentation. Was saturating 90% during my evaluation.  -Wean off of oxygen as tolerated. .     #.  Peripheral vascular disease with gangrenous changes to the left great toe:  -Per Dr. Triplett Factor -Pt with auto-amputated left great toe  Will consult vascular surgery for evaluation and possible CO2 angio due to worsening kidney function. Pt needs amputation of the toe once vascular anatomy improves otherwise will need BKA. -Patient received vancomycin, ceftriaxone in the ER  -Arterial Dopplers-2020- demonstrated dampened waveforms left lower extremity with high-grade stenosis involving the right distal superficial femoral artery and proximal popliteal artery.  -Patient currently not on antibiotics, will trend CRP and procalcitonin cefepime, vancomycin, and Flagyl. Infectious disease is not available this week. -Obtain wound culture       #.  Hypertension with accelerated hypertension at admission  -Resume home medications-carvedilol and doxazosin, however, losartan was discontinued for now per nephrology     #. Diabetes mellitus type 2, on chronic insulin  -HbA1c 6.6-12/10/2020  -Patient is currently on insulin sliding scale. Was taken off of long-acting insulin recently.  -Continue insulin sliding scale with hypoglycemia protocol     #. Diabetic foot ulcer on the plantar aspect of left foot     #. Coronary artery disease s/p PCI and stenting-2018  -Continue Plavix, carvedilol, statin     #. Obstructive sleep apnea-not compliant with BiPAP/CPAP     #. Chronic kidney disease stage III     #. Hyperlipidemia-continue atorvastatin      DVT Prophylaxis: Heparin  GI Prophylaxis: Not indicated  Code Status: FULL. Subjective: When I saw him he was supine in bed, and denied any complaints    Objective:        Intake/Output Summary (Last 24 hours) at 12/22/2020 2054  Last data filed at 12/22/2020 1829  Gross per 24 hour   Intake 10 ml   Output 500 ml   Net -490 ml      Vitals:   Vitals:    12/22/20 2034   BP: (!) 135/41   Pulse: 61   Resp: 21   Temp: 98.3 °F (36.8 °C)   SpO2: 91%     Physical Exam:      Lungs/respiratory effort nonlabored at rest    Neurologic-speech clear    Skin-no cyanosis    Medications:   Medications:    sodium chloride flush  10 mL Intravenous 2 times per day    bumetanide  1 mg Intravenous BID    insulin lispro  0-12 Units Subcutaneous TID WC    insulin lispro  0-6 Units Subcutaneous Nightly    atorvastatin  40 mg Oral Nightly    carvedilol  3.125 mg Oral BID    clopidogrel  75 mg Oral Daily    doxazosin  4 mg Oral BID    pregabalin  150 mg Oral BID    heparin (porcine)  5,000 Units Subcutaneous 3 times per day    metOLazone  2.5 mg Oral BID      Infusions:    dextrose       PRN Meds:     sodium chloride flush, 10 mL, PRN      promethazine, 12.5 mg, Q6H PRN    Or      ondansetron, 4 mg, Q6H PRN      polyethylene glycol, 17 g, Daily PRN     acetaminophen, 650 mg, Q6H PRN    Or      acetaminophen, 650 mg, Q6H PRN      glucose, 15 g, PRN      dextrose, 12.5 g, PRN      glucagon (rDNA), 1 mg, PRN      dextrose, 100 mL/hr, PRN      albuterol sulfate HFA, 2 puff, Q4H PRN      morphine, 2 mg, Q4H PRN          Electronically signed by Eber Vasquez MD on 12/22/2020 at 8:54 PM

## 2020-12-23 NOTE — PROGRESS NOTES
[address] [facility]  consulted by Dr. Catalino Charlton for monitoring and adjustment. Indication for treatment: Gangrenous left great toe  Goal trough: 15 mcg/mL    Pertinent Laboratory Values:   Temp Readings from Last 3 Encounters:   12/23/20 97.6 °F (36.4 °C) (Oral)   12/17/20 96.7 °F (35.9 °C) (Infrared)   12/13/20 98.2 °F (36.8 °C)     Recent Labs     12/21/20  0017 12/22/20  0248   WBC 10.0 6.5   LACTATE 1.3  --      Recent Labs     12/21/20  0017 12/22/20  0248 12/23/20  0243   BUN 14 16 22   CREATININE 1.6* 1.8* 1.9*     Estimated Creatinine Clearance: 51 mL/min (A) (based on SCr of 1.9 mg/dL (H)). Intake/Output Summary (Last 24 hours) at 12/23/2020 1008  Last data filed at 12/23/2020 0949  Gross per 24 hour   Intake 10 ml   Output 2675 ml   Net -2665 ml     Pertinent Cultures:  Date    Source    Results  12/21   Blood    NGTD  12/22   Wound    Sent     Vancomycin level:   TROUGH:  No results for input(s): VANCOTROUGH in the last 72 hours. RANDOM:    Recent Labs     12/23/20  0244   VANCORANDOM 6.2       Assessment:  · WBC and temperature: WBC WNL/afebrile  · SCr, BUN, and urine output: Trending up   · Day(s) of therapy: 2  · Vancomycin concentration: 6.2    Plan:  · Will give vancomycin 1750mg x 1   · Intermittent dosing due to current renal function  · Pharmacy will continue to monitor patient and adjust therapy as indicated    Brent 3 12/24 @0600    Thank you for the consult.   Kristin Bates RPh   12/23/2020 10:08 AM

## 2020-12-23 NOTE — PROGRESS NOTES
Called report to [employer] on [address] [facility]. Patient to be transferred to room 3116.

## 2020-12-23 NOTE — PROGRESS NOTES
Nephrology Progress Note  12/23/2020 2:01 PM        Subjective:   Admit Date: 12/20/2020  PCP: Jose Licea    Interval History: Pt seen in early am     Diet: NPO for cath     ROS:  No sob , going for angio    Data:     Current meds:    cefepime  2 g Intravenous Q12H    metroNIDAZOLE  500 mg Intravenous Q8H    diazePAM  5 mg Intravenous Once    vancomycin  1,750 mg Intravenous Once    sodium chloride flush  10 mL Intravenous 2 times per day    bumetanide  1 mg Intravenous BID    insulin lispro  0-12 Units Subcutaneous TID WC    insulin lispro  0-6 Units Subcutaneous Nightly    atorvastatin  40 mg Oral Nightly    carvedilol  3.125 mg Oral BID    clopidogrel  75 mg Oral Daily    doxazosin  4 mg Oral BID    pregabalin  150 mg Oral BID    heparin (porcine)  5,000 Units Subcutaneous 3 times per day    metOLazone  2.5 mg Oral BID      dextrose           I/O last 3 completed shifts: In: 10 [I.V.:10]  Out: 2100 [Urine:2100]    CBC:   Recent Labs     12/21/20  0017 12/22/20  0248   WBC 10.0 6.5   HGB 10.9* 9.4*    207          Recent Labs     12/21/20  0017 12/22/20  0248 12/23/20  0243    141 145   K 4.5 4.8 4.5    105 107   CO2 28 29 30   BUN 14 16 22   CREATININE 1.6* 1.8* 1.9*   GLUCOSE 201* 148* 87       Lab Results   Component Value Date    CALCIUM 7.8 (L) 12/23/2020    PHOS 3.1 12/23/2020       Objective:     Vitals: BP (!) 148/67   Pulse 64   Temp 97.6 °F (36.4 °C) (Oral)   Resp 15   Ht 6' (1.829 m)   Wt 292 lb 12.3 oz (132.8 kg)   SpO2 97%   BMI 39.71 kg/m²     General appearance:  No distress  HEENT:  ++ conj pallor  Neck:  supple  Lungs:  No crackles  Heart:    Abdomen: soft  Extremities:  +++ edema , scrotal edema , RLE gangrene       Problem List :         Impression :     1. CKD stage 3 a A3- stable   2. ASCVD / gangrene for angio   3. DM/HTn / anemia   4. Also ADHF / fluid overload     Recommendation/Plan  :     1. Angio this am   2. Labs in am   3.  Good BS control  4.  Follow clinically 'he will need amputation - we need to know th site depending on  angio finding       Norberto Stevens MD

## 2020-12-23 NOTE — PROGRESS NOTES
Cardiology Progress Note     Today's Plan cpm    Admit Date:  12/20/2020    Consult reason/ Seen today for: shortness of breath - non healing left foot great toe ulcer    Subjective and  Overnight Events:  Reports his breathing has improved- continues with scrotal and leg edema. Denies chest pain plan for angiogram today    Assessment / Plan / Recommendation:     1. Acute on chronic combined decompensated heart failure. EF of 45% with LVH NYHA Class: II.  Volume overloaded. Fluid status is unknown. Continue with IV diuresis- bumex 1 mg bid/ metolazone 2.5 mg bid-  Please continue Gudelines recommended medical thearpy including coreg and losartan daily. Daily weights, strict Is and O's  Fluid restriction 1800 ml- low sodium diet   2. Stop Pletal  3. PAD- H/o of PTA of right SFA and popliteal-recent arterial doppler with dampened waveforms- for angiogram with CTS- on plavix   4. CAD- stable -has chronic elevation of troponin- continue BB and plavix  5. non healing ulcer to left great toe- has seen surgery (Dr Seun Owens) -possible amputation   6. Uncontrolled HTN:bp improved- continue with losartan           History of Presenting Illness:    Chief complain on admission : 79 y. o.year old who is admitted for  Chief Complaint   Patient presents with    Shortness of Breath     Worsening tonight, worse with movement    Groin Swelling     x2wks    Wound Infection     Left foot amputation per Dr. Seun Owens.          Past medical history:    has a past medical history of Acid reflux, Acute MI ([address] Arthritis, Broken teeth, CAD (coronary artery disease), Cardiomyopathy ([address] CHF (congestive heart failure) ([address] Chronic back pain, Chronic kidney disease, Diabetes mellitus ([address] Diabetic neuropathy ([address] H/O cardiovascular stress test, H/O Doppler ultrasound, H/O percutaneous left heart catheterization, History of irregular heartbeat, History of syncope, Hyperlipidemia, Hypertension, Leg swelling, Necrotic toes (HCC), Neuropathy, neuropathy, PAD (peripheral artery disease) ([facility] PVD (peripheral vascular disease) ([facility] Sick sinus syndrome ([facility] Sleep apnea, Spinal stenosis, Teeth missing, and Type 2 diabetes mellitus without complication ([facility] Past surgical history:   has a past surgical history that includes Coronary angioplasty with stent; Dental surgery; Colonoscopy (08/04/2016); pacemaker placement (06/04/2010); vascular surgery; colectomy (Right, 08/26/2016); Toe amputation (Right, 09/12/2017); Toe amputation (Right, 01/09/2018); Cardiac catheterization; Cardiac defibrillator placement (06/04/2010); Coronary angioplasty; Cardiac catheterization (07/14/2017); and Cardiac catheterization (11/20/2018). Social History:   reports that he has quit smoking. His smoking use included cigars. He started smoking about 41 years ago. He smoked 0.00 packs per day for 36.00 years. He has never used smokeless tobacco. He reports current drug use. Drug: Marijuana. He reports that he does not drink alcohol. Family history:  family history includes Cancer in his brother, father, and son; Diabetes in his brother, mother, and sister; Early Death (age of onset: 62) in his sister; Heart Disease in his brother and sister; High Blood Pressure in his brother, brother, and mother; Neuropathy in his sister; Other in his sister; Stroke in his mother; Vision Loss in his mother.     Allergies   Allergen Reactions    Pcn [Penicillins] Hives    Fentanyl Itching       Review of Systems:   All 14 systems were reviewed and are negative  Except for the positive findings which are documented     BP (!) 148/67   Pulse 64   Temp 97.6 °F (36.4 °C) (Oral)   Resp 15   Ht 6' (1.829 m)   Wt 292 lb 12.3 oz (132.8 kg)   SpO2 97%   BMI 39.71 kg/m²       Intake/Output Summary (Last 24 hours) at 12/23/2020 1128  Last data filed at 12/23/2020 0924  Gross per 24 hour   Intake 10 ml Output 2675 ml   Net -2665 ml       Physical Exam:  Constitutional:  Well developed, No acute distress  HENT:  Normocephalic, Atraumatic, Bilateral external ears normal,  Nose normal.   Neck- trachea is midline  Eyes:  PEERL, Conjunctiva normal  Respiratory: decreased breath sounds, No respiratory distress, No wheezing, No chest tenderness. Cardiovascular:  Normal heart rate, Normal rhythm, no murmurs appreciated, No rubs appreciated, No gallops appreciated, JVP not elevated  Abdomen/GI:  Soft, No tenderness  Musculoskeletal:  Intact distal pulses, + edema to lower legs, + tenderness, No cyanosis, No clubbing. - scrotal swelling   Integument:  Warm, Dry- ulcer to left great toe   Lymphatic:  No lymphadenopathy noted.    Neurologic:  Alert & oriented  Psychiatric:  Affect and Mood :pleasant     Medications:    cefepime  2 g Intravenous Q12H    metroNIDAZOLE  500 mg Intravenous Q8H    sodium chloride flush  10 mL Intravenous 2 times per day    bumetanide  1 mg Intravenous BID    insulin lispro  0-12 Units Subcutaneous TID WC    insulin lispro  0-6 Units Subcutaneous Nightly    atorvastatin  40 mg Oral Nightly    carvedilol  3.125 mg Oral BID    clopidogrel  75 mg Oral Daily    doxazosin  4 mg Oral BID    pregabalin  150 mg Oral BID    heparin (porcine)  5,000 Units Subcutaneous 3 times per day    metOLazone  2.5 mg Oral BID      dextrose       sodium chloride flush, promethazine **OR** ondansetron, polyethylene glycol, acetaminophen **OR** acetaminophen, glucose, dextrose, glucagon (rDNA), dextrose, albuterol sulfate HFA, morphine    Lab Data:  CBC:   Recent Labs     12/21/20 0017 12/22/20 0248   WBC 10.0 6.5   HGB 10.9* 9.4*   HCT 36.4* 31.8*   MCV 97.1 96.4    207     BMP:   Recent Labs     12/21/20  0017 12/22/20  0248 12/23/20  0243    141 145   K 4.5 4.8 4.5    105 107   CO2 28 29 30   PHOS  --   --  3.1   BUN 14 16 22   CREATININE 1.6* 1.8* 1.9*     PT/INR: No results for input(s): PROTIME, INR in the last 72 hours. BNP:    Recent Labs     12/21/20 0017   PROBNP 3,684*     TROPONIN:   Recent Labs     12/21/20 0017   TROPONINT 0.024*            Impression:  Active Problems:    Chronic coronary artery disease    Automatic implantable cardioverter-defibrillator in situ    Chronic kidney disease, stage III (moderate)    PVD (peripheral vascular disease) ([facility])    Acute on chronic systolic CHF (congestive heart failure) ([facility]  Resolved Problems:    * No resolved hospital problems. *       All labs, medications and tests reviewed by myself, continue all other medications of all above medical condition listed as is except for changes mentioned above. Thank you   Please call with questions.     Electronically signed by Jada Fitzpatrick MD on 12/23/2020 at 11:28 AM

## 2020-12-24 LAB
GLUCOSE BLD-MCNC: 177 MG/DL (ref 70–99)
GLUCOSE BLD-MCNC: 178 MG/DL (ref 70–99)
GLUCOSE BLD-MCNC: 196 MG/DL (ref 70–99)
GLUCOSE BLD-MCNC: 224 MG/DL (ref 70–99)
HCT VFR BLD CALC: 32.4 % (ref 42–52)
HEMOGLOBIN: 9.6 GM/DL (ref 13.5–18)
MCH RBC QN AUTO: 28.4 PG (ref 27–31)
MCHC RBC AUTO-ENTMCNC: 29.6 % (ref 32–36)
MCV RBC AUTO: 95.9 FL (ref 78–100)
PDW BLD-RTO: 13.9 % (ref 11.7–14.9)
PLATELET # BLD: 193 K/CU MM (ref 140–440)
PMV BLD AUTO: 10.4 FL (ref 7.5–11.1)
PROCALCITONIN: 0.08
RBC # BLD: 3.38 M/CU MM (ref 4.6–6.2)
WBC # BLD: 5.6 K/CU MM (ref 4–10.5)

## 2020-12-24 PROCEDURE — 6360000002 HC RX W HCPCS: Performed by: INTERNAL MEDICINE

## 2020-12-24 PROCEDURE — 2700000000 HC OXYGEN THERAPY PER DAY

## 2020-12-24 PROCEDURE — 83735 ASSAY OF MAGNESIUM: CPT

## 2020-12-24 PROCEDURE — 80048 BASIC METABOLIC PNL TOTAL CA: CPT

## 2020-12-24 PROCEDURE — 99233 SBSQ HOSP IP/OBS HIGH 50: CPT | Performed by: INTERNAL MEDICINE

## 2020-12-24 PROCEDURE — 2500000003 HC RX 250 WO HCPCS: Performed by: INTERNAL MEDICINE

## 2020-12-24 PROCEDURE — 2580000003 HC RX 258: Performed by: INTERNAL MEDICINE

## 2020-12-24 PROCEDURE — 94761 N-INVAS EAR/PLS OXIMETRY MLT: CPT

## 2020-12-24 PROCEDURE — 80202 ASSAY OF VANCOMYCIN: CPT

## 2020-12-24 PROCEDURE — 6370000000 HC RX 637 (ALT 250 FOR IP): Performed by: INTERNAL MEDICINE

## 2020-12-24 PROCEDURE — 82962 GLUCOSE BLOOD TEST: CPT

## 2020-12-24 PROCEDURE — 80053 COMPREHEN METABOLIC PANEL: CPT

## 2020-12-24 PROCEDURE — 86141 C-REACTIVE PROTEIN HS: CPT

## 2020-12-24 PROCEDURE — 36415 COLL VENOUS BLD VENIPUNCTURE: CPT

## 2020-12-24 PROCEDURE — 85027 COMPLETE CBC AUTOMATED: CPT

## 2020-12-24 PROCEDURE — 84145 PROCALCITONIN (PCT): CPT

## 2020-12-24 PROCEDURE — 99232 SBSQ HOSP IP/OBS MODERATE 35: CPT | Performed by: SURGERY

## 2020-12-24 PROCEDURE — 2140000000 HC CCU INTERMEDIATE R&B

## 2020-12-24 RX ADMIN — METRONIDAZOLE 500 MG: 500 INJECTION, SOLUTION INTRAVENOUS at 03:36

## 2020-12-24 RX ADMIN — CEFEPIME HYDROCHLORIDE 2 G: 2 INJECTION, POWDER, FOR SOLUTION INTRAVENOUS at 08:57

## 2020-12-24 RX ADMIN — MORPHINE SULFATE 2 MG: 2 INJECTION, SOLUTION INTRAMUSCULAR; INTRAVENOUS at 19:14

## 2020-12-24 RX ADMIN — SODIUM CHLORIDE, PRESERVATIVE FREE 10 ML: 5 INJECTION INTRAVENOUS at 23:47

## 2020-12-24 RX ADMIN — METRONIDAZOLE 500 MG: 500 INJECTION, SOLUTION INTRAVENOUS at 12:14

## 2020-12-24 RX ADMIN — METOLAZONE 2.5 MG: 2.5 TABLET ORAL at 21:53

## 2020-12-24 RX ADMIN — PREGABALIN 150 MG: 75 CAPSULE ORAL at 21:53

## 2020-12-24 RX ADMIN — BUMETANIDE 1 MG: 0.25 INJECTION INTRAMUSCULAR; INTRAVENOUS at 08:56

## 2020-12-24 RX ADMIN — DOXAZOSIN 4 MG: 4 TABLET ORAL at 21:53

## 2020-12-24 RX ADMIN — ATORVASTATIN CALCIUM 40 MG: 40 TABLET, FILM COATED ORAL at 21:53

## 2020-12-24 RX ADMIN — MORPHINE SULFATE 2 MG: 2 INJECTION, SOLUTION INTRAMUSCULAR; INTRAVENOUS at 23:47

## 2020-12-24 RX ADMIN — CARVEDILOL 3.12 MG: 3.12 TABLET, FILM COATED ORAL at 08:58

## 2020-12-24 RX ADMIN — CARVEDILOL 3.12 MG: 3.12 TABLET, FILM COATED ORAL at 21:53

## 2020-12-24 RX ADMIN — METRONIDAZOLE 500 MG: 500 INJECTION, SOLUTION INTRAVENOUS at 17:18

## 2020-12-24 RX ADMIN — HEPARIN SODIUM 5000 UNITS: 5000 INJECTION INTRAVENOUS; SUBCUTANEOUS at 05:53

## 2020-12-24 RX ADMIN — HEPARIN SODIUM 5000 UNITS: 5000 INJECTION INTRAVENOUS; SUBCUTANEOUS at 21:48

## 2020-12-24 RX ADMIN — SODIUM CHLORIDE, PRESERVATIVE FREE 10 ML: 5 INJECTION INTRAVENOUS at 08:58

## 2020-12-24 RX ADMIN — BUMETANIDE 1 MG: 0.25 INJECTION INTRAMUSCULAR; INTRAVENOUS at 23:52

## 2020-12-24 RX ADMIN — PREGABALIN 150 MG: 75 CAPSULE ORAL at 08:58

## 2020-12-24 RX ADMIN — METOLAZONE 2.5 MG: 2.5 TABLET ORAL at 08:58

## 2020-12-24 RX ADMIN — CLOPIDOGREL BISULFATE 75 MG: 75 TABLET ORAL at 08:58

## 2020-12-24 RX ADMIN — MORPHINE SULFATE 2 MG: 2 INJECTION, SOLUTION INTRAMUSCULAR; INTRAVENOUS at 03:35

## 2020-12-24 RX ADMIN — CEFEPIME HYDROCHLORIDE 2 G: 2 INJECTION, POWDER, FOR SOLUTION INTRAVENOUS at 23:50

## 2020-12-24 RX ADMIN — HEPARIN SODIUM 5000 UNITS: 5000 INJECTION INTRAVENOUS; SUBCUTANEOUS at 15:26

## 2020-12-24 RX ADMIN — DOXAZOSIN 4 MG: 4 TABLET ORAL at 08:58

## 2020-12-24 ASSESSMENT — PAIN SCALES - GENERAL
PAINLEVEL_OUTOF10: 7
PAINLEVEL_OUTOF10: 7
PAINLEVEL_OUTOF10: 8

## 2020-12-24 ASSESSMENT — PAIN DESCRIPTION - FREQUENCY: FREQUENCY: CONTINUOUS

## 2020-12-24 ASSESSMENT — PAIN DESCRIPTION - ONSET: ONSET: ON-GOING

## 2020-12-24 ASSESSMENT — PAIN - FUNCTIONAL ASSESSMENT: PAIN_FUNCTIONAL_ASSESSMENT: PREVENTS OR INTERFERES SOME ACTIVE ACTIVITIES AND ADLS

## 2020-12-24 ASSESSMENT — PAIN DESCRIPTION - ORIENTATION: ORIENTATION: LEFT

## 2020-12-24 ASSESSMENT — PAIN DESCRIPTION - PAIN TYPE: TYPE: CHRONIC PAIN

## 2020-12-24 ASSESSMENT — PAIN DESCRIPTION - PROGRESSION: CLINICAL_PROGRESSION: NOT CHANGED

## 2020-12-24 NOTE — PROGRESS NOTES
Cardiology Progress Note     Today's Plan cpm    Admit Date:  12/20/2020    Consult reason/ Seen today for: shortness of breath - non healing left foot great toe ulcer    Subjective and  Overnight Events:  Reports his breathing has improved- continues with scrotal and leg edema. S/p PCI of the left 100% occluded SFA by CT surgery yesterday    Assessment / Plan / Recommendation:     1. Acute on chronic combined decompensated heart failure. EF of 45% with LVH NYHA Class: II.  Volume overloaded. Lost more than 4 L so far continue with IV diuresis- bumex 1 mg bid/ metolazone 2.5 mg bid-  Please continue Gudelines recommended medical thearpy including coreg and losartan daily. Daily weights, strict Is and O's  Fluid restriction 1800 ml- low sodium diet   2. Stop Pletal due to CHF, get labs still pending from today  3. PAD-underwent left leg PCI by CT surgery continue aspirin Plavix  4. Gangrenous left toe plan for amputation as per Dr. Kristian Acevedo  5. CAD- stable -has chronic elevation of troponin- continue BB and plavix  6. non healing ulcer to left great toe- has seen surgery (Dr Yovanny Castillo) -possible amputation   7. Uncontrolled HTN:bp improved- continue with losartan           History of Presenting Illness:    Chief complain on admission : 79 y. o.year old who is admitted for  Chief Complaint   Patient presents with    Shortness of Breath     Worsening tonight, worse with movement    Groin Swelling     x2wks    Wound Infection     Left foot amputation per Dr. Yovanny Castillo.          Past medical history:    has a past medical history of Acid reflux, Acute MI ([address] Arthritis, Broken teeth, CAD (coronary artery disease), Cardiomyopathy ([address] CHF (congestive heart failure) ([address] Chronic back pain, Chronic kidney disease, Diabetes mellitus ([address] Diabetic neuropathy ([address] H/O cardiovascular stress test, H/O Doppler ultrasound, H/O percutaneous left heart filed at 12/24/2020 0541  Gross per 24 hour   Intake --   Output 1100 ml   Net -1100 ml       Physical Exam:  Constitutional:  Well developed, No acute distress  HENT:  Normocephalic, Atraumatic, Bilateral external ears normal,  Nose normal.   Neck- trachea is midline  Eyes:  PEERL, Conjunctiva normal  Respiratory: decreased breath sounds, No respiratory distress, No wheezing, No chest tenderness. Cardiovascular:  Normal heart rate, Normal rhythm, no murmurs appreciated, No rubs appreciated, No gallops appreciated, JVP not elevated  Abdomen/GI:  Soft, No tenderness  Musculoskeletal:  Intact distal pulses, + edema to lower legs, + tenderness, No cyanosis, No clubbing. - scrotal swelling   Integument:  Warm, Dry- ulcer to left great toe   Lymphatic:  No lymphadenopathy noted.    Neurologic:  Alert & oriented  Psychiatric:  Affect and Mood :pleasant     Medications:    cefepime  2 g Intravenous Q12H    metroNIDAZOLE  500 mg Intravenous Q8H    diazePAM  5 mg Intravenous Once    sodium chloride flush  10 mL Intravenous 2 times per day    bumetanide  1 mg Intravenous BID    insulin lispro  0-12 Units Subcutaneous TID WC    insulin lispro  0-6 Units Subcutaneous Nightly    atorvastatin  40 mg Oral Nightly    carvedilol  3.125 mg Oral BID    clopidogrel  75 mg Oral Daily    doxazosin  4 mg Oral BID    pregabalin  150 mg Oral BID    heparin (porcine)  5,000 Units Subcutaneous 3 times per day    metOLazone  2.5 mg Oral BID      dextrose       sodium chloride flush, promethazine **OR** ondansetron, polyethylene glycol, acetaminophen **OR** acetaminophen, glucose, dextrose, glucagon (rDNA), dextrose, albuterol sulfate HFA, morphine    Lab Data:  CBC:   Recent Labs     12/22/20  0248 12/24/20  0943   WBC 6.5 5.6   HGB 9.4* 9.6*   HCT 31.8* 32.4*   MCV 96.4 95.9    193     BMP:   Recent Labs     12/22/20  0248 12/23/20  0243    145   K 4.8 4.5    107   CO2 29 30   PHOS  --  3.1   BUN 16 22 CREATININE 1.8* 1.9*     PT/INR: No results for input(s): PROTIME, INR in the last 72 hours. BNP:    No results for input(s): PROBNP in the last 72 hours. TROPONIN:   No results for input(s): TROPONINT in the last 72 hours. Impression:  Active Problems:    Chronic coronary artery disease    Automatic implantable cardioverter-defibrillator in situ    Chronic kidney disease, stage III (moderate)    PVD (peripheral vascular disease) ([facility])    Acute on chronic systolic CHF (congestive heart failure) ([facility]  Resolved Problems:    * No resolved hospital problems. *       All labs, medications and tests reviewed by myself, continue all other medications of all above medical condition listed as is except for changes mentioned above. Thank you   Please call with questions.     Electronically signed by Jerod Monsalve MD on 12/24/2020 at 11:06 AM

## 2020-12-24 NOTE — PROGRESS NOTES
PATIENT NAME: Kassidy Holman    TODAY'S DATE: 12/24/20    Reason for today's visit: Status post left peripheral intervention for limb salvage. SUBJECTIVE:  Patient feels much improved from the intervention which was complex due to heavy calcification and presence of CKD    Bilateral leg swelling also substantially improved  . OBJECTIVE:   VITALS:    Vitals:    12/24/20 0600   BP: (!) 117/52   Pulse: 64   Resp: 16   Temp: 97.9 °F (36.6 °C)   SpO2: 90%     INTAKE/OUTPUT:    Date 12/24/20 0000 - 12/24/20 2359   Shift 6187-4124 6547-3062 0896-4080 24 Hour Total   INTAKE   Shift Total(mL/kg)       OUTPUT   Urine(mL/kg/hr) 1100(1)   1100   Shift Total(mL/kg) 1100(8.3)   1100(8.3)   Weight (kg) 132.8 132.8 132.8 132.8      Patient Vitals for the past 96 hrs (Last 3 readings):   Weight   12/23/20 0312 292 lb 12.3 oz (132.8 kg)   12/22/20 0430 293 lb 14 oz (133.3 kg)   12/21/20 1619 291 lb 0.1 oz (132 kg)       EXAM:  Constitutional: Blood pressure (!) 117/52, pulse 64, temperature 97.9 °F (36.6 °C), temperature source Axillary, resp. rate 16, height 6' (1.829 m), weight 292 lb 12.3 oz (132.8 kg), SpO2 90 %. No apparent distress, appears stated age and cooperative. Neurologic: follows commands, no focal weakness noted   Lungs: Good respiratory effort.  Clear to auscultation,   CV: Regular rate/ rhythm reduced peripheral edema,  GI: Soft, non-tender in all four quadrants, non-distended, + bowel sounds, spleen and liver no palpable masses   : bladder nondistended   MSK: no obvious deformity   Skin: warm, pink and dry on the right side  The left foot remains bandaged but is able to move the ankle and has identified with Doppler pulses      Data:  CBC:   Recent Labs     12/22/20  0248   WBC 6.5   HGB 9.4*   HCT 31.8*        BMP:    Recent Labs     12/22/20  0248 12/23/20  0243    145   K 4.8 4.5    107   CO2 29 30   BUN 16 22   CREATININE 1.8* 1.9*   GLUCOSE 148* 87     Hepatic:   Recent Labs 12/23/20  0243   AST 9*   ALT <5*   BILITOT 0.5   ALKPHOS 54     Mag:      Recent Labs     12/22/20 0248 12/23/20 0243   MG 1.9 1.8      Phos:     Recent Labs     12/23/20 0243   PHOS 3.1          ASSESSMENT:  Patient Active Problem List   Diagnosis    Chronic coronary artery disease    Automatic implantable cardioverter-defibrillator in situ    Chronic combined systolic and diastolic heart failure (HCC)    Chronic kidney disease, stage III (moderate)    Mixed hyperlipidemia    Sick sinus syndrome (HCC)    Type 2 diabetes mellitus with diabetic polyneuropathy ([facility]    Spinal stenosis of lumbar region    Obesity, Class I, BMI 30-34.9    Postoperative hypertension    S/P partial colectomy    Tubulovillous adenoma of colon    Microalbuminuria    PVD (peripheral vascular disease) ([facility])    Limb ischemia    Necrotic toes ([facility])    Toe gangrene ([facility])    Diabetic foot infection ([facility]    Chronic kidney disease (CKD) stage G3a/A2, moderately decreased glomerular filtration rate (GFR) between 45-59 mL/min/1.73 square meter and albuminuria creatinine ratio between  mg/g    DM (diabetes mellitus) ([facility])    Edema    ICD (implantable cardioverter-defibrillator) battery depletion    Hyperkalemia    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[facility])    Ischemia of toe    Acute kidney injury ([facility])    Fluid overload    DM (diabetes mellitus) ([address]    Hypertensive emergency    Precordial pain    Acute chest pain    Unstable angina ([facility])    Chronic kidney disease (CKD) stage G3a/A3, moderately decreased glomerular filtration rate (GFR) between 45-59 mL/min/1.73 square meter and albuminuria creatinine ratio greater than 300 mg/g    Cardiomyopathy ([address]    Hypertensive crisis    Diabetic neuropathy ([facility]    HTN (hypertension)    Epigastric pain    Heart failure exacerbated by sotalol ([facility])    Bilateral lower extremity edema    Elevated troponin    Acute on chronic systolic CHF (congestive heart failure) ([address] PLAN:     Status post limb salvage intervention day #1 on the left leg with significant improvement of the vascular status of the left leg  Continue present treatment of with regards to vascular disease  There is expected to further be assessed by Dr. Oakley Osgood for consideration of limited amputation in view of improved vascularity

## 2020-12-24 NOTE — CARE COORDINATION
D/c plan is home with daughter,Passport, and [facility] vs ARU d/t pt may require an amputation. Pt will not go to a SNF but will consider ARU if needed. Pura/ARU following. CM will continue to follow. .      Pt is active with [facility]. At dc, please call [facility] to notify them of dc and need for resumption of previous services at [phone]. Please fax AVS, facesheet, and [facility] order to [phone].  TE

## 2020-12-24 NOTE — PROGRESS NOTES
[id number] [facility]  consulted by Dr. Mary Palomo for monitoring and adjustment. Indication for treatment: Gangrenous left great toe  Goal trough: 15 mcg/mL    Pertinent Laboratory Values:   Temp Readings from Last 3 Encounters:   12/24/20 96.7 °F (35.9 °C) (Oral)   12/17/20 96.7 °F (35.9 °C) (Infrared)   12/13/20 98.2 °F (36.8 °C)     Recent Labs     12/22/20  0248 12/24/20  0943   WBC 6.5 5.6     Recent Labs     12/22/20  0248 12/23/20  0243   BUN 16 22   CREATININE 1.8* 1.9*     Estimated Creatinine Clearance: 51 mL/min (A) (based on SCr of 1.9 mg/dL (H)). Intake/Output Summary (Last 24 hours) at 12/24/2020 7699  Last data filed at 12/24/2020 0541  Gross per 24 hour   Intake --   Output 1100 ml   Net -1100 ml     Pertinent Cultures:  Date    Source    Results  12/21   Blood    NGTD  12/22   Wound    Pending     Vancomycin level:   TROUGH:  No results for input(s): VANCOTROUGH in the last 72 hours. RANDOM:    Recent Labs     12/23/20  0244   VANCORANDOM 6.2       Assessment:  · WBC and temperature: WBC WNL/afebrile  · SCr, BUN, and urine output: previously trending up, no new level today   · Day(s) of therapy: 3  · Vancomycin concentration:   · 12/23: 6.2, 42h post-dose, appropriate to re-dose    Plan:  · Intermittent dosing due to current renal function  · No vancomycin due today  · Repeat next level tomorrow AM  · Pharmacy will continue to monitor patient and adjust therapy as indicated    Brent 3 12/25 @ 0600    Thank you for the consult.   Linda Yancey RPh   12/24/2020 2:57 PM

## 2020-12-24 NOTE — PROGRESS NOTES
Comprehensive Nutrition Assessment    Type and Reason for Visit:  Initial(length of stay)    Nutrition Recommendations/Plan:   Continue carb controlled diet with low sodium, fluid restriction as needed   Encourage consistent intake     Nutrition Assessment:  Admit with shortness of breath, CHF, nonhealing toe ulcer. Plan for toe amputation. Currently on carb controlled diet with 1000 ml fluid restriction. Prior diet ed for carb controlled and low sodium diet, willing to follow during stay and home to prevent swelling. Moderate nutrition risk at this time. Malnutrition Assessment:  Malnutrition Status: At risk for malnutrition (Comment)    Context:  Chronic Illness       Estimated Daily Nutrient Needs:  Energy (kcal):  8136-8487; Weight Used for Energy Requirements:  Current     Protein (g):  81-97 (1-1.2 g/kg);  Weight Used for Protein Requirements:  Ideal        Fluid (ml/day):  per current order for fluid restricition; Method Used for Fluid Requirements:  Other (Comment)      Nutrition Related Findings:  sitting up in be eating lunch, adjusting to diet, some missing teeth but able to eat meals, hx DM, PVD, CAD      Wounds:  Diabetic Ulcer       Current Nutrition Therapies:    DIET CARB CONTROL; Daily Fluid Restriction: 1000 ml  Diet NPO Time Specified Exceptions are: Sips with Meds    Anthropometric Measures:  · Height: 6' (182.9 cm)  · Current Body Weight: 292 lb 12.3 oz (132.8 kg)   · Admission Body Weight: 291 lb 0.1 oz (132 kg)    · Usual Body Weight: 270 lb (122.5 kg)(per hx october)     · Ideal Body Weight: 178 lbs; % Ideal Body Weight 164.5 %   · BMI: 39.7  · Adjusted Body Weight:  ; No Adjustment   · BMI Categories: Obese Class 2 (BMI 35.0 -39.9)       Nutrition Diagnosis:   · Increased nutrient needs related to increase demand for energy/nutrients as evidenced by wounds      Nutrition Interventions:   Food and/or Nutrient Delivery:  Modify Current Diet  Nutrition Education/Counseling:  Education initiated   Coordination of Nutrition Care:  Continue to monitor while inpatient    Goals:  Patient will comply with diet, meal intake at least 50-75%       Nutrition Monitoring and Evaluation:   Behavioral-Environmental Outcomes:  None Identified   Food/Nutrient Intake Outcomes:  Diet Advancement/Tolerance, Food and Nutrient Intake  Physical Signs/Symptoms Outcomes:  Biochemical Data, Skin, Weight, Meal Time Behavior     Discharge Planning:    Continue current diet     Electronically signed by Latoya Kendall RD, LD on 12/24/20 at 12:28 PM EST    Contact: [phone]

## 2020-12-24 NOTE — PROGRESS NOTES
Nephrology Progress Note  12/24/2020 11:19 AM        Subjective:   Admit Date: 12/20/2020  PCP: Twila Sheikh    Interval History: underwent angio and intervention LE -     Diet: better    ROS:  No sob , feels good- no /c/R    Angio note -     Occluded Prox L Popliteal      Good PT and peroneal foot      Decision made to intervene      Difficulty passing catheter across lesion. 3mm balloon used to dilate lesion   to allow catheter to pass. PTA an DCB then performed of L SFA and Popliteal with good result    Data:     Current meds:    cefepime  2 g Intravenous Q12H    metroNIDAZOLE  500 mg Intravenous Q8H    diazePAM  5 mg Intravenous Once    sodium chloride flush  10 mL Intravenous 2 times per day    bumetanide  1 mg Intravenous BID    insulin lispro  0-12 Units Subcutaneous TID WC    insulin lispro  0-6 Units Subcutaneous Nightly    atorvastatin  40 mg Oral Nightly    carvedilol  3.125 mg Oral BID    clopidogrel  75 mg Oral Daily    doxazosin  4 mg Oral BID    pregabalin  150 mg Oral BID    heparin (porcine)  5,000 Units Subcutaneous 3 times per day    metOLazone  2.5 mg Oral BID      dextrose           I/O last 3 completed shifts: In: 10 [I.V.:10]  Out: 1675 [Urine:1675]    CBC:   Recent Labs     12/22/20  0248 12/24/20  0943   WBC 6.5 5.6   HGB 9.4* 9.6*    193          Recent Labs     12/22/20  0248 12/23/20  0243    145   K 4.8 4.5    107   CO2 29 30   BUN 16 22   CREATININE 1.8* 1.9*   GLUCOSE 148* 87       Lab Results   Component Value Date    CALCIUM 7.8 (L) 12/23/2020    PHOS 3.1 12/23/2020       Objective:     Vitals: BP (!) 117/52   Pulse 64   Temp 97.9 °F (36.6 °C) (Axillary)   Resp 16   Ht 6' (1.829 m)   Wt 292 lb 12.3 oz (132.8 kg)   SpO2 90%   BMI 39.71 kg/m²     General appearance:  No ac distress  HE ENT:  ++ conj pallor  Neck:  supple  Lungs:  Coarse Bs  Heart:  RRR  Abdomen: soft  Extremities:  ++ edema .  LLE / toe gangrene   scrotal edema better       Problem List :         Impression :     1. CKD stage 3 a  A3-   Acceptable cr - watch   2. ASCVD  S/p angio and intervention   3. Skin / soft tissue / gangrene an ? Bone infection he will need amputation  4. F;disha overload  better     Recommendation/Plan  :     1. He is diuresis well with loop- IV an po thiazide   2. Ok to keep it  3. Watch for adequate \" capillary plasma refill' by UOP , wt and cr level   4.  Daily wt  5. follow clinically       Mireya Pryor MD

## 2020-12-24 NOTE — PROGRESS NOTES
GENERAL SURGERY PROGRESS NOTE    Hafsa Treviño is a 79 y.o. male with left great toe and foot wounds, known to Dr. Moni Antonio. Subjective:  Doing ok this morning. Voices aggravation about his IV pump beeping overnight. Pain well controlled. Underwent successful endovascular intervention for the left leg yesterday. He is very appreciative of Dr. Janey aDily. Objective:    Vitals: VITALS:  BP (!) 117/52   Pulse 64   Temp 97.9 °F (36.6 °C) (Axillary)   Resp 16   Ht 6' (1.829 m)   Wt 292 lb 12.3 oz (132.8 kg)   SpO2 90%   BMI 39.71 kg/m²     I/O: 12/23 0701 - 12/24 0700  In: 10 [I.V.:10]  Out: 1675 [Urine:1675]    Labs/Imaging Results:   Lab Results   Component Value Date     12/23/2020    K 4.5 12/23/2020    K 5.1 01/10/2018     12/23/2020    CO2 30 12/23/2020    BUN 22 12/23/2020    CREATININE 1.9 12/23/2020    GLUCOSE 87 12/23/2020    CALCIUM 7.8 12/23/2020      Lab Results   Component Value Date    WBC 5.6 12/24/2020    HGB 9.6 (L) 12/24/2020    HCT 32.4 (L) 12/24/2020    MCV 95.9 12/24/2020     12/24/2020       IV Fluids: dextrose    Scheduled Meds:   cefepime, 2 g, Intravenous, Q12H    metroNIDAZOLE, 500 mg, Intravenous, Q8H    diazePAM, 5 mg, Intravenous, Once    sodium chloride flush, 10 mL, Intravenous, 2 times per day    bumetanide, 1 mg, Intravenous, BID    insulin lispro, 0-12 Units, Subcutaneous, TID WC    insulin lispro, 0-6 Units, Subcutaneous, Nightly    atorvastatin, 40 mg, Oral, Nightly    carvedilol, 3.125 mg, Oral, BID    clopidogrel, 75 mg, Oral, Daily    doxazosin, 4 mg, Oral, BID    pregabalin, 150 mg, Oral, BID    heparin (porcine), 5,000 Units, Subcutaneous, 3 times per day    metOLazone, 2.5 mg, Oral, BID    Physical Exam:  General: A&O x 3, no distress. HEENT: Anicteric sclerae, MMM. Extremities: Right groin site clean and dry. Left great to with dry gangrene of the tip.   Left lateral foot with ruptured blister, minimal erythema or edema. Abdomen: Soft, nontender, nondistended. Angiogram findings/intervention:  Procedure Narrative      Findings: Occluded Prox L Popliteal      Good PT and peroneal foot      Decision made to intervene      Difficulty passing catheter across lesion. 3mm balloon used to dilate lesion   to allow catheter to pass. PTA an DCB then performed of L SFA and Popliteal with good result    Assessment and Plan:  79 y.o. male with PAD s/p vascular intervention. We discussed amputation of the left great toe today. He is agreeable with this. Risks and benefits were discussed. Will plan for OR on Saturday.     Patient Active Problem List:     Chronic coronary artery disease     Automatic implantable cardioverter-defibrillator in situ     Chronic combined systolic and diastolic heart failure (HCC)     Chronic kidney disease, stage III (moderate)     Mixed hyperlipidemia     Sick sinus syndrome ([facility])     Type 2 diabetes mellitus with diabetic polyneuropathy ([address]     Spinal stenosis of lumbar region     Obesity, Class I, BMI 30-34.9     Postoperative hypertension     S/P partial colectomy     Tubulovillous adenoma of colon     Microalbuminuria     PVD (peripheral vascular disease) ([facility])     Limb ischemia     Necrotic toes ([facility])     Toe gangrene ([facility])     Diabetic foot infection ([address]     Chronic kidney disease (CKD) stage G3a/A2, moderately decreased glomerular filtration rate (GFR) between 45-59 mL/min/1.73 square meter and albuminuria creatinine ratio between  mg/g     DM (diabetes mellitus) ([address]     Edema     ICD (implantable cardioverter-defibrillator) battery depletion     Hyperkalemia     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[facility])     Ischemia of toe     Acute kidney injury ([address]     Fluid overload     DM (diabetes mellitus) ([address]     Hypertensive emergency     Precordial pain     Acute chest pain     Unstable angina (HCC)     Chronic kidney disease (CKD) stage G3a/A3, moderately decreased glomerular filtration rate (GFR) between 45-59 mL/min/1.73 square meter and albuminuria creatinine ratio greater than 300 mg/g     Cardiomyopathy ([facility]     Hypertensive crisis     Diabetic neuropathy (HCC)     HTN (hypertension)     Epigastric pain     Heart failure exacerbated by sotalol (HCC)     Bilateral lower extremity edema     Elevated troponin     Acute on chronic systolic CHF (congestive heart failure) ([facility]      - plan for OR for left great toe amputation on 12/25  - NPO at midnight 12/24    Carolina Gonzalez MD

## 2020-12-24 NOTE — PROGRESS NOTES
Mariela Garcia MD, [address]                Internal Medicine Hospitalist             Daily Progress  Note   Subjective:     Chief Complaint   Patient presents with    Shortness of Breath     Worsening tonight, worse with movement    Groin Swelling     x2wks    Wound Infection     Left foot amputation per Dr. Yvrose Albrecht. Mr. Juan Bassett Complains of nil. Objective:    BP (!) 117/52   Pulse 64   Temp 97.9 °F (36.6 °C) (Axillary)   Resp 16   Ht 6' (1.829 m)   Wt 292 lb 12.3 oz (132.8 kg)   SpO2 90%   BMI 39.71 kg/m²      Intake/Output Summary (Last 24 hours) at 12/24/2020 1249  Last data filed at 12/24/2020 0541  Gross per 24 hour   Intake --   Output 1100 ml   Net -1100 ml      Physical Exam:  Heart:  Distant heart sounds. Lungs: Mostly clear to auscultation, decreased breath sounds at bases. No wheezes appreciated no crackles heard. Abdomen: Soft, non distended. Bowel sounds appreciated. No obvious liver or spleen enlargement. Non tender, no rebound noted. Extremities: No exam left foot as bandaged. .  CNS: Grossly intact.     Labs:  CBC with Differential:    Lab Results   Component Value Date    WBC 5.6 12/24/2020    RBC 3.38 12/24/2020    HGB 9.6 12/24/2020    HCT 32.4 12/24/2020     12/24/2020    MCV 95.9 12/24/2020    MCH 28.4 12/24/2020    MCHC 29.6 12/24/2020    RDW 13.9 12/24/2020    SEGSPCT 70.2 12/22/2020    LYMPHOPCT 20.6 12/22/2020    MONOPCT 6.7 12/22/2020    EOSPCT 1.5 09/08/2011    BASOPCT 0.3 12/22/2020    MONOSABS 0.4 12/22/2020    LYMPHSABS 1.3 12/22/2020    EOSABS 0.1 12/22/2020    BASOSABS 0.0 12/22/2020    DIFFTYPE AUTOMATED DIFFERENTIAL 12/22/2020     CMP:    Lab Results   Component Value Date     12/23/2020    K 4.5 12/23/2020    K 5.1 01/10/2018     12/23/2020    CO2 30 12/23/2020    BUN 22 12/23/2020    CREATININE 1.9 12/23/2020    GFRAA 43 12/23/2020    LABGLOM 35 12/23/2020    GLUCOSE 87 12/23/2020    PROT 5.9 12/23/2020    PROT 6.3 01/21/2013    LABALBU 3.1 12/23/2020    CALCIUM 7.8 12/23/2020    BILITOT 0.5 12/23/2020    ALKPHOS 54 12/23/2020    AST 9 12/23/2020    ALT <5 12/23/2020     No results for input(s): TROPONINT in the last 72 hours.   Lab Results   Component Value Date    TSHHS 0.872 10/23/2020         dextrose        cefepime  2 g Intravenous Q12H    metroNIDAZOLE  500 mg Intravenous Q8H    diazePAM  5 mg Intravenous Once    sodium chloride flush  10 mL Intravenous 2 times per day    bumetanide  1 mg Intravenous BID    insulin lispro  0-12 Units Subcutaneous TID WC    insulin lispro  0-6 Units Subcutaneous Nightly    atorvastatin  40 mg Oral Nightly    carvedilol  3.125 mg Oral BID    clopidogrel  75 mg Oral Daily    doxazosin  4 mg Oral BID    pregabalin  150 mg Oral BID    heparin (porcine)  5,000 Units Subcutaneous 3 times per day    metOLazone  2.5 mg Oral BID         Assessment:       Patient Active Problem List    Diagnosis Date Noted    Precordial pain 07/18/2018     Priority: High    Acute chest pain      Priority: High    Acute on chronic systolic CHF (congestive heart failure) [facility] 12/21/2020    Bilateral lower extremity edema     Elevated troponin     Heart failure exacerbated by sotalol ([facility] 12/10/2020    Epigastric pain 08/12/2020    HTN (hypertension) 05/20/2019    Diabetic neuropathy ([address] 05/02/2019    Hypertensive crisis 03/11/2019    Chronic kidney disease (CKD) stage G3a/A3, moderately decreased glomerular filtration rate (GFR) between 45-59 mL/min/1.73 square meter and albuminuria creatinine ratio greater than 300 mg/g 01/29/2019    Cardiomyopathy ([facility] 01/29/2019    Unstable angina ([facility] 11/19/2018    Hypertensive emergency 07/17/2018    Acute kidney injury ([facility] 02/09/2018    Fluid overload 02/09/2018    DM (diabetes mellitus) ([facility] 02/09/2018    Ischemia of toe     Hyperkalemia 01/09/2018    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[address]     ICD (implantable cardioverter-defibrillator) battery depletion 10/11/2017    Chronic kidney disease (CKD) stage G3a/A2, moderately decreased glomerular filtration rate (GFR) between 45-59 mL/min/1.73 square meter and albuminuria creatinine ratio between  mg/g 10/06/2017    DM (diabetes mellitus) ([facility] 10/06/2017    Edema 10/06/2017    [facility]     Toe gangrene ([facility] 07/26/2017    Necrotic toes ([facility] 07/06/2017    PVD (peripheral vascular disease) ([facility]     Limb ischemia     Microalbuminuria 01/22/2017    Tubulovillous adenoma of colon 10/20/2016    Postoperative hypertension 08/27/2016    S/P partial colectomy 08/27/2016    Chronic coronary artery disease 05/31/2016    Chronic kidney disease, stage III (moderate) 05/31/2016    Sick sinus syndrome ([facility] 05/31/2016    Type 2 diabetes mellitus with diabetic polyneuropathy ([facility] 05/31/2016    Spinal stenosis of lumbar region 05/31/2016    Obesity, Class I, BMI 30-34.9 05/31/2016    Chronic combined systolic and diastolic heart failure ([facility] 03/03/2016    Automatic implantable cardioverter-defibrillator in situ 09/03/2015    Mixed hyperlipidemia 04/13/2015       Plan:     Problems being addressed this admission:   Acute on chronic systolic CHF:  91/11/21--XNNVLCO complaining of shortness of breath, has lower extremity swelling, scrotal swelling, mildly elevated proBNP. -Patient admits to not having good urine output after taking diuretics at home. Patient is on torsemide 100 mg twice daily, metolazone daily-Patient received IV Lasix 40 mg in the ED.-We will continue with IV Bumex as he responded well in the recent admission.-Strict input, output, daily weight.-Appreciate cardiology consult-please see note - continue GDMT -Peripheral angiogram by Dr. Izabel Martini to be done tomorrow for PAD sheeba for limb salvage   12/23/20- no chest pains no sob noted continue to monitor. 12/24/20- cardiology wrote yesterday' Acute on chronic combined decompensated heart failure.  EF of 45% with LVH NYHA Class: II.  Volume overloaded. Lost more than 4 L so far continue with IV diuresis- bumex 1 mg bid/ metolazone 2.5 mg bid-  Please continue [facility]ud[facility] recommended medical thearpy including coreg and losartan daily. Daily weights, strict Is and O's  Fluid restriction 1800 ml- low sodium diet Stop Pletal due to CHF, get labs still pending from today'. Continue to monitor, stable.     Acute hypoxic respiratory failure: Possibly secondary to fluid overload from CHF exacerbation  12/22/20--Patient was saturating 85% on room air at presentation.  Was saturating 90% during my evaluation.-Wean off of oxygen as tolerated. .  12/23/20- stable      Peripheral vascular disease with gangrenous changes to the left great toe:  12/22/20--Per Dr. Arellano -Pt with auto-amputated left great toeWill consult vascular surgery for evaluation and possible CO2 angio due to worsening kidney function. Pt needs amputation of the toe once vascular anatomy improves otherwise will need BKA. -Patient received vancomycin, ceftriaxone in the ER-Arterial Dopplers-12/11/2020- demonstrated dampened waveforms left lower extremity with high-grade stenosis involving the right distal superficial femoral artery and proximal popliteal artery.  -Patient currently not on antibiotics, will trend CRP and procalcitonin cefepime, vancomycin, and Flagyl.  Infectious disease is not available this week. -Obtain wound culture Diabetic foot ulcer on the plantar aspect of left foot  12/23/20- please see detailed angio note. Further recommendation per cardio and surgery. 12/24/20- surgery wrote ' - plan for OR for left great toe amputation on 12/25 - NPO at midnight 12/24'     Hypertension with accelerated hypertension at admission  12/22/20--Resume home medications-carvedilol and doxazosin, however, losartan was discontinued for now per nephrology  12/23/20- b/p is 145/76 today.   12/24/20- b/p is 117/52 today.      Diabetes mellitus type 2, on chronic insulin  12/22/20-HbA1c 6.6-12/10/2020 -Patient is currently on insulin sliding scale.  Was taken off of long-acting insulin recently.-Continue insulin sliding scale with hypoglycemia protocol  12/23/20- sugar is 141 today. 12/24/20- sugar is 177 today and continue to monitor.       Other chronic problems as of 12/22/20  #. Coronary artery disease s/p PCI and stenting-2018  -Continue Plavix, carvedilol, statin     #. Obstructive sleep apnea-not compliant with BiPAP/CPAP     #. Chronic kidney disease stage III     #. Hyperlipidemia-continue atorvastatin      Consultants:  Nephrology  Cardiology  Vascular surgery  gen surgery    General Orders:  Repeat basic labs again in am.  I have explained to the patient and discussed with him/her the treatment plan.   Governor MD Katie, [address]

## 2020-12-25 ENCOUNTER — ANESTHESIA EVENT (OUTPATIENT)
Dept: OPERATING ROOM | Age: 70
DRG: 252 | End: 2020-12-25
Payer: MEDICARE

## 2020-12-25 LAB
ALBUMIN SERPL-MCNC: 3.1 GM/DL (ref 3.4–5)
ALP BLD-CCNC: 48 IU/L (ref 40–128)
ALT SERPL-CCNC: <5 U/L (ref 10–40)
ANION GAP SERPL CALCULATED.3IONS-SCNC: 10 MMOL/L (ref 4–16)
ANION GAP SERPL CALCULATED.3IONS-SCNC: 9 MMOL/L (ref 4–16)
AST SERPL-CCNC: 9 IU/L (ref 15–37)
BILIRUB SERPL-MCNC: 0.6 MG/DL (ref 0–1)
BUN BLDV-MCNC: 26 MG/DL (ref 6–23)
BUN BLDV-MCNC: 29 MG/DL (ref 6–23)
CALCIUM SERPL-MCNC: 7.7 MG/DL (ref 8.3–10.6)
CALCIUM SERPL-MCNC: 7.7 MG/DL (ref 8.3–10.6)
CHLORIDE BLD-SCNC: 103 MMOL/L (ref 99–110)
CHLORIDE BLD-SCNC: 103 MMOL/L (ref 99–110)
CO2: 31 MMOL/L (ref 21–32)
CO2: 31 MMOL/L (ref 21–32)
CREAT SERPL-MCNC: 1.9 MG/DL (ref 0.9–1.3)
CREAT SERPL-MCNC: 2 MG/DL (ref 0.9–1.3)
DOSE AMOUNT: NORMAL
DOSE AMOUNT: NORMAL
DOSE TIME: NORMAL
DOSE TIME: NORMAL
GFR AFRICAN AMERICAN: 40 ML/MIN/1.73M2
GFR AFRICAN AMERICAN: 43 ML/MIN/1.73M2
GFR NON-AFRICAN AMERICAN: 33 ML/MIN/1.73M2
GFR NON-AFRICAN AMERICAN: 35 ML/MIN/1.73M2
GLUCOSE BLD-MCNC: 151 MG/DL (ref 70–99)
GLUCOSE BLD-MCNC: 161 MG/DL (ref 70–99)
GLUCOSE BLD-MCNC: 178 MG/DL (ref 70–99)
GLUCOSE BLD-MCNC: 201 MG/DL (ref 70–99)
GLUCOSE BLD-MCNC: 204 MG/DL (ref 70–99)
GLUCOSE BLD-MCNC: 227 MG/DL (ref 70–99)
HCT VFR BLD CALC: 28.9 % (ref 42–52)
HEMOGLOBIN: 8.7 GM/DL (ref 13.5–18)
HIGH SENSITIVE C-REACTIVE PROTEIN: 58.3 MG/L
HIGH SENSITIVE C-REACTIVE PROTEIN: 62.5 MG/L
MAGNESIUM: 1.6 MG/DL (ref 1.8–2.4)
MAGNESIUM: 1.7 MG/DL (ref 1.8–2.4)
MCH RBC QN AUTO: 28.3 PG (ref 27–31)
MCHC RBC AUTO-ENTMCNC: 30.1 % (ref 32–36)
MCV RBC AUTO: 94.1 FL (ref 78–100)
PDW BLD-RTO: 14.2 % (ref 11.7–14.9)
PLATELET # BLD: 165 K/CU MM (ref 140–440)
PMV BLD AUTO: 10.1 FL (ref 7.5–11.1)
POTASSIUM SERPL-SCNC: 4.6 MMOL/L (ref 3.5–5.1)
POTASSIUM SERPL-SCNC: 4.6 MMOL/L (ref 3.5–5.1)
RBC # BLD: 3.07 M/CU MM (ref 4.6–6.2)
SODIUM BLD-SCNC: 143 MMOL/L (ref 135–145)
SODIUM BLD-SCNC: 144 MMOL/L (ref 135–145)
TOTAL PROTEIN: 5.9 GM/DL (ref 6.4–8.2)
VANCOMYCIN RANDOM: 7.1 UG/ML
VANCOMYCIN RANDOM: 9.8 UG/ML
WBC # BLD: 6.2 K/CU MM (ref 4–10.5)

## 2020-12-25 PROCEDURE — 94761 N-INVAS EAR/PLS OXIMETRY MLT: CPT

## 2020-12-25 PROCEDURE — 6360000002 HC RX W HCPCS: Performed by: INTERNAL MEDICINE

## 2020-12-25 PROCEDURE — 80048 BASIC METABOLIC PNL TOTAL CA: CPT

## 2020-12-25 PROCEDURE — 2580000003 HC RX 258: Performed by: INTERNAL MEDICINE

## 2020-12-25 PROCEDURE — 2500000003 HC RX 250 WO HCPCS: Performed by: INTERNAL MEDICINE

## 2020-12-25 PROCEDURE — 6370000000 HC RX 637 (ALT 250 FOR IP): Performed by: INTERNAL MEDICINE

## 2020-12-25 PROCEDURE — 36415 COLL VENOUS BLD VENIPUNCTURE: CPT

## 2020-12-25 PROCEDURE — 2140000000 HC CCU INTERMEDIATE R&B

## 2020-12-25 PROCEDURE — 85027 COMPLETE CBC AUTOMATED: CPT

## 2020-12-25 PROCEDURE — 83735 ASSAY OF MAGNESIUM: CPT

## 2020-12-25 PROCEDURE — 99232 SBSQ HOSP IP/OBS MODERATE 35: CPT | Performed by: SURGERY

## 2020-12-25 PROCEDURE — 86141 C-REACTIVE PROTEIN HS: CPT

## 2020-12-25 PROCEDURE — 82962 GLUCOSE BLOOD TEST: CPT

## 2020-12-25 PROCEDURE — 80202 ASSAY OF VANCOMYCIN: CPT

## 2020-12-25 PROCEDURE — 99233 SBSQ HOSP IP/OBS HIGH 50: CPT | Performed by: INTERNAL MEDICINE

## 2020-12-25 RX ADMIN — ATORVASTATIN CALCIUM 40 MG: 40 TABLET, FILM COATED ORAL at 20:18

## 2020-12-25 RX ADMIN — BUMETANIDE 1 MG: 0.25 INJECTION INTRAMUSCULAR; INTRAVENOUS at 20:19

## 2020-12-25 RX ADMIN — HEPARIN SODIUM 5000 UNITS: 5000 INJECTION INTRAVENOUS; SUBCUTANEOUS at 20:18

## 2020-12-25 RX ADMIN — PREGABALIN 150 MG: 75 CAPSULE ORAL at 20:18

## 2020-12-25 RX ADMIN — MORPHINE SULFATE 2 MG: 2 INJECTION, SOLUTION INTRAMUSCULAR; INTRAVENOUS at 11:20

## 2020-12-25 RX ADMIN — METRONIDAZOLE 500 MG: 500 INJECTION, SOLUTION INTRAVENOUS at 02:22

## 2020-12-25 RX ADMIN — DOXAZOSIN 4 MG: 4 TABLET ORAL at 20:18

## 2020-12-25 RX ADMIN — MORPHINE SULFATE 2 MG: 2 INJECTION, SOLUTION INTRAMUSCULAR; INTRAVENOUS at 16:28

## 2020-12-25 RX ADMIN — VANCOMYCIN HYDROCHLORIDE 1500 MG: 5 INJECTION, POWDER, LYOPHILIZED, FOR SOLUTION INTRAVENOUS at 13:37

## 2020-12-25 RX ADMIN — HEPARIN SODIUM 5000 UNITS: 5000 INJECTION INTRAVENOUS; SUBCUTANEOUS at 12:15

## 2020-12-25 RX ADMIN — SODIUM CHLORIDE, PRESERVATIVE FREE 10 ML: 5 INJECTION INTRAVENOUS at 09:09

## 2020-12-25 RX ADMIN — CARVEDILOL 3.12 MG: 3.12 TABLET, FILM COATED ORAL at 20:18

## 2020-12-25 RX ADMIN — BUMETANIDE 1 MG: 0.25 INJECTION INTRAMUSCULAR; INTRAVENOUS at 11:19

## 2020-12-25 RX ADMIN — METRONIDAZOLE 500 MG: 500 INJECTION, SOLUTION INTRAVENOUS at 16:28

## 2020-12-25 RX ADMIN — HEPARIN SODIUM 5000 UNITS: 5000 INJECTION INTRAVENOUS; SUBCUTANEOUS at 05:27

## 2020-12-25 RX ADMIN — METRONIDAZOLE 500 MG: 500 INJECTION, SOLUTION INTRAVENOUS at 09:07

## 2020-12-25 RX ADMIN — SODIUM CHLORIDE, PRESERVATIVE FREE 10 ML: 5 INJECTION INTRAVENOUS at 20:33

## 2020-12-25 RX ADMIN — CEFEPIME HYDROCHLORIDE 2 G: 2 INJECTION, POWDER, FOR SOLUTION INTRAVENOUS at 11:19

## 2020-12-25 ASSESSMENT — PAIN SCALES - GENERAL: PAINLEVEL_OUTOF10: 9

## 2020-12-25 ASSESSMENT — PAIN DESCRIPTION - ONSET: ONSET: ON-GOING

## 2020-12-25 ASSESSMENT — PAIN DESCRIPTION - DESCRIPTORS: DESCRIPTORS: BURNING;STABBING

## 2020-12-25 ASSESSMENT — PAIN DESCRIPTION - PAIN TYPE: TYPE: CHRONIC PAIN

## 2020-12-25 NOTE — PROGRESS NOTES
[address] [facility]  consulted by Dr. Phoenix Madera for monitoring and adjustment. Indication for treatment: Gangrenous left great toe  Goal trough: 15 mcg/mL    Pertinent Laboratory Values:   Temp Readings from Last 3 Encounters:   12/25/20 97.8 °F (36.6 °C) (Oral)   12/17/20 96.7 °F (35.9 °C) (Infrared)   12/13/20 98.2 °F (36.8 °C)     Recent Labs     12/24/20  0943 12/25/20  0311   WBC 5.6 6.2     Recent Labs     12/23/20  0243 12/24/20  0943 12/25/20  0311   BUN 22 26* 29*   CREATININE 1.9* 2.0* 1.9*     Estimated Creatinine Clearance: 50 mL/min (A) (based on SCr of 1.9 mg/dL (H)). Intake/Output Summary (Last 24 hours) at 12/25/2020 1243  Last data filed at 12/25/2020 0500  Gross per 24 hour   Intake 150 ml   Output 1540 ml   Net -1390 ml     Pertinent Cultures:  Date    Source    Results  12/21   Blood    NGTD  12/22   Wound    Pending     Vancomycin level:   TROUGH:  No results for input(s): VANCOTROUGH in the last 72 hours. RANDOM:    Recent Labs     12/23/20  0244 12/24/20  0943 12/25/20 0311   VANCORANDOM 6.2 9.8 7.1       Assessment:  · WBC and temperature: WBC normal, pt remains afebrile  · SCr, BUN, and urine output: CKD3, SCR steady @1.9,  UOP good  · Day(s) of therapy: 4  · Vancomycin concentration:   · 12/23: 6.2, 42h post-dose, appropriate to re-dose  · 12/24: 9.8  · 12/25: 7.1 (38 hour level, ok to re-dose)    Plan:  · Intermittent dosing due to CKD3 and SCR trends  · Vanco level subtherapeutic @7.1  · Give vancomycin 1,500mg ivpb x1 dose today  · Repeat next level tomorrow AM  · Pharmacy will continue to monitor patient and adjust therapy as indicated    Brent 3 12/26 @ 12:00    Thank you for the consult. Odalys Cali   12/25/2020 12:43 PM

## 2020-12-25 NOTE — PROGRESS NOTES
Nephrology Progress Note  12/25/2020 2:20 PM        Subjective:   Admit Date: 12/20/2020  PCP: Jessica Sellers    Interval History: pt seen in early am     Diet: NPO     ROS:  Amp today per pt- no sob , good UOP     Data:     Current meds:    vancomycin  1,500 mg Intravenous Once    vancomycin (VANCOCIN) intermittent dosing (placeholder)   Other RX Placeholder    cefepime  2 g Intravenous Q12H    metroNIDAZOLE  500 mg Intravenous Q8H    diazePAM  5 mg Intravenous Once    sodium chloride flush  10 mL Intravenous 2 times per day    bumetanide  1 mg Intravenous BID    insulin lispro  0-12 Units Subcutaneous TID WC    insulin lispro  0-6 Units Subcutaneous Nightly    atorvastatin  40 mg Oral Nightly    carvedilol  3.125 mg Oral BID    clopidogrel  75 mg Oral Daily    doxazosin  4 mg Oral BID    pregabalin  150 mg Oral BID    heparin (porcine)  5,000 Units Subcutaneous 3 times per day      dextrose           I/O last 3 completed shifts: In: 150 [IV Piggyback:150]  [facility]: 1540 [[facility]    CBC:   Recent Labs     12/24/20  0943 12/25/20  0311   WBC 5.6 6.2   HGB 9.6* 8.7*    165          Recent Labs     12/23/20  0243 12/24/20  0943 12/25/20  0311    144 143   K 4.5 4.6 4.6    103 103   CO2 30 31 31   BUN 22 26* 29*   CREATININE 1.9* 2.0* 1.9*   GLUCOSE 87 201* 178*       Lab Results   Component Value Date    CALCIUM 7.7 (L) 12/25/2020    PHOS 3.1 12/23/2020       Objective:     Vitals: BP (!) 155/55   Pulse 76   Temp 97.8 °F (36.6 °C) (Oral)   Resp 16   Ht 6' (1.829 m)   Wt 277 lb 9 oz (125.9 kg)   SpO2 91%   BMI 37.64 kg/m²     General appearance:  No distress  HEENT:   No ross conj pallor  Neck:  supple  Lungs:  rhonchi , no crackles  Heart:  Seems RRR  Abdomen: soft- less scrotal edema   Extremities:  Less LE edema   Toe gangrene       Problem List :         Impression :     1. CKD   Stage 3  A / B  a3- acceptable   2. Fluid overload - better -   3.  ASCVD/ HTN gangrene  Etc     Recommendation/Plan  :     1. Amp today  2. On diuretic   3. Will start acei. arb soon   4. daily wt' watch UOP  5. Follow clinically   6. With surgery has risk for NANCY-   7.  Labs in am       Yesica Ortiz MD

## 2020-12-25 NOTE — PROGRESS NOTES
GENERAL SURGERY PROGRESS NOTE    Denice Izquierdo is a 79 y.o. male with left great toe and foot wounds, known to Dr. Charleen Puente. Subjective:  Doing ok this morning. Pain well controlled. Denies complaints. Objective:    Vitals: VITALS:  BP (!) 155/55   Pulse 65   Temp 98.4 °F (36.9 °C) (Oral)   Resp 17   Ht 6' (1.829 m)   Wt 277 lb 9 oz (125.9 kg)   SpO2 99%   BMI 37.64 kg/m²     I/O: 12/24 0701 - 12/25 0700  In: 150   Out: 1517 [Urine:1540]    Labs/Imaging Results:   Lab Results   Component Value Date     12/25/2020    K 4.6 12/25/2020    K 5.1 01/10/2018     12/25/2020    CO2 31 12/25/2020    BUN 29 12/25/2020    CREATININE 1.9 12/25/2020    GLUCOSE 178 12/25/2020    CALCIUM 7.7 12/25/2020      Lab Results   Component Value Date    WBC 6.2 12/25/2020    HGB 8.7 (L) 12/25/2020    HCT 28.9 (L) 12/25/2020    MCV 94.1 12/25/2020     12/25/2020       IV Fluids: dextrose    Scheduled Meds:   vancomycin (VANCOCIN) intermittent dosing (placeholder), , Other, RX Placeholder    cefepime, 2 g, Intravenous, Q12H    metroNIDAZOLE, 500 mg, Intravenous, Q8H    diazePAM, 5 mg, Intravenous, Once    sodium chloride flush, 10 mL, Intravenous, 2 times per day    bumetanide, 1 mg, Intravenous, BID    insulin lispro, 0-12 Units, Subcutaneous, TID WC    insulin lispro, 0-6 Units, Subcutaneous, Nightly    atorvastatin, 40 mg, Oral, Nightly    carvedilol, 3.125 mg, Oral, BID    clopidogrel, 75 mg, Oral, Daily    doxazosin, 4 mg, Oral, BID    pregabalin, 150 mg, Oral, BID    heparin (porcine), 5,000 Units, Subcutaneous, 3 times per day    metOLazone, 2.5 mg, Oral, BID    Physical Exam:  General: A&O x 3, no distress. HEENT: Anicteric sclerae, MMM. Extremities: Right groin site clean and dry. Left great to with dry gangrene of the tip. Left lateral foot with ruptured blister, minimal erythema or edema. Abdomen: Soft, nontender, nondistended.      Angiogram findings/intervention:  Procedure Narrative      Findings: Occluded Prox L Popliteal      Good PT and peroneal foot      Decision made to intervene      Difficulty passing catheter across lesion. 3mm balloon used to dilate lesion   to allow catheter to pass. PTA an DCB then performed of L SFA and Popliteal with good result    Assessment and Plan:  79 y.o. male with PAD s/p vascular intervention. He has dry gangrene of the left great toe. Will plan for OR on Saturday for toe amputation.     Patient Active Problem List:     Chronic coronary artery disease     Automatic implantable cardioverter-defibrillator in situ     Chronic combined systolic and diastolic heart failure (HCC)     Chronic kidney disease, stage III (moderate)     Mixed hyperlipidemia     Sick sinus syndrome ([facility])     Type 2 diabetes mellitus with diabetic polyneuropathy ([address]     Spinal stenosis of lumbar region     Obesity, Class I, BMI 30-34.9     Postoperative hypertension     S/P partial colectomy     Tubulovillous adenoma of colon     Microalbuminuria     PVD (peripheral vascular disease) ([facility])     Limb ischemia     Necrotic toes ([facility])     Toe gangrene ([facility])     Diabetic foot infection ([address]     Chronic kidney disease (CKD) stage G3a/A2, moderately decreased glomerular filtration rate (GFR) between 45-59 mL/min/1.73 square meter and albuminuria creatinine ratio between  mg/g     DM (diabetes mellitus) ([address]     Edema     ICD (implantable cardioverter-defibrillator) battery depletion     Hyperkalemia     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[facility])     Ischemia of toe     Acute kidney injury ([address]     Fluid overload     DM (diabetes mellitus) ([address]     Hypertensive emergency     Precordial pain     Acute chest pain     Unstable angina ([facility])     Chronic kidney disease (CKD) stage G3a/A3, moderately decreased glomerular filtration rate (GFR) between 45-59 mL/min/1.73 square meter and albuminuria creatinine ratio greater than 300 mg/g     Cardiomyopathy ([address] Hypertensive crisis     Diabetic neuropathy (HCC)     HTN (hypertension)     Epigastric pain     Heart failure exacerbated by sotalol (HCC)     Bilateral lower extremity edema     Elevated troponin     Acute on chronic systolic CH[facility] (congest[facility]) [facility]      - NPO at midnight  - to OR tomorrow for left great toe amputation    Ron Briones MD

## 2020-12-25 NOTE — PROGRESS NOTES
Eda Ruvalcaba MD, [address]                Internal Medicine Hospitalist             Daily Progress  Note   Subjective:     Chief Complaint   Patient presents with    Shortness of Breath     Worsening tonight, worse with movement    Groin Swelling     x2wks    Wound Infection     Left foot amputation per Dr. Mitzi Ross. Mr. Sandra Sanford of pain left foot this am.     Objective:    BP (!) 155/55   Pulse 65   Temp 98.4 °F (36.9 °C) (Oral)   Resp 17   Ht 6' (1.829 m)   Wt 277 lb 9 oz (125.9 kg)   SpO2 99%   BMI 37.64 kg/m²      Intake/Output Summary (Last 24 hours) at 12/25/2020 1123  Last data filed at 12/25/2020 0500  Gross per 24 hour   Intake 150 ml   Output 1540 ml   Net -1390 ml      Physical Exam:  Heart:  Distant heart sounds. Lungs: Mostly clear to auscultation, decreased breath sounds at bases. No wheezes appreciated no crackles heard. Abdomen: Soft, non distended. Bowel sounds appreciated. No obvious liver or spleen enlargement. Non tender, no rebound noted. Extremities: Non tender, +1 swelling noted, strength 5/5 both legs. CNS: Grossly intact.     Labs:  CBC with Differential:    Lab Results   Component Value Date    WBC 6.2 12/25/2020    RBC 3.07 12/25/2020    HGB 8.7 12/25/2020    HCT 28.9 12/25/2020     12/25/2020    MCV 94.1 12/25/2020    MCH 28.3 12/25/2020    MCHC 30.1 12/25/2020    RDW 14.2 12/25/2020    SEGSPCT 70.2 12/22/2020    LYMPHOPCT 20.6 12/22/2020    MONOPCT 6.7 12/22/2020    EOSPCT 1.5 09/08/2011    BASOPCT 0.3 12/22/2020    MONOSABS 0.4 12/22/2020    LYMPHSABS 1.3 12/22/2020    EOSABS 0.1 12/22/2020    BASOSABS 0.0 12/22/2020    DIFFTYPE AUTOMATED DIFFERENTIAL 12/22/2020     BMP:    Lab Results   Component Value Date     12/25/2020    K 4.6 12/25/2020    K 5.1 01/10/2018     12/25/2020    CO2 31 12/25/2020    BUN 29 12/25/2020    LABALBU 3.1 12/24/2020    CREATININE 1.9 12/25/2020    CALCIUM 7.7 12/25/2020    GFRAA 43 12/25/2020    LABGLOM 35 12/25/2020    GLUCOSE 178 12/25/2020     No results for input(s): TROPONINT in the last 72 hours.   Lab Results   Component Value Date    TSHHS 0.872 10/23/2020         dextrose        vancomycin (VANCOCIN) intermittent dosing (placeholder)   Other RX Placeholder    cefepime  2 g Intravenous Q12H    metroNIDAZOLE  500 mg Intravenous Q8H    diazePAM  5 mg Intravenous Once    sodium chloride flush  10 mL Intravenous 2 times per day    bumetanide  1 mg Intravenous BID    insulin lispro  0-12 Units Subcutaneous TID WC    insulin lispro  0-6 Units Subcutaneous Nightly    atorvastatin  40 mg Oral Nightly    carvedilol  3.125 mg Oral BID    clopidogrel  75 mg Oral Daily    doxazosin  4 mg Oral BID    pregabalin  150 mg Oral BID    heparin (porcine)  5,000 Units Subcutaneous 3 times per day    metOLazone  2.5 mg Oral BID         Assessment:       Patient Active Problem List    Diagnosis Date Noted    Precordial pain 07/18/2018     Priority: High    Acute chest pain      Priority: High    Acute on chronic systolic CHF (congestive heart failure) ([facility] [address] 12/21/2020    Bilateral lower extremity edema     Elevated troponin     Heart failure exacerbated by sotalol ([facility] [address] 12/10/2020    Epigastric pain 08/12/2020    HTN (hypertension) 05/20/2019    Diabetic neuropathy ([facility] [address] 05/02/2019    Hypertensive crisis 03/11/2019    Chronic kidney disease (CKD) stage G3a/A3, moderately decreased glomerular filtration rate (GFR) between 45-59 mL/min/1.73 square meter and albuminuria creatinine ratio greater than 300 mg/g 01/29/2019    Cardiomyopathy ([facility] [address] 01/29/2019    Unstable angina ([facility] [address] 11/19/2018    Hypertensive emergency 07/17/2018    Acute kidney injury ([facility] [address] 02/09/2018    Fluid overload 02/09/2018    DM (diabetes mellitus) ([facility] [address] 02/09/2018    Ischemia of toe     Hyperkalemia 01/09/2018    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[facility] [address]     ICD (implantable cardioverter-defibrillator) battery depletion 10/11/2017    Chronic kidney continue to monitor.   12/24/20- cardiology wrote yesterday' Acute on chronic combined decompensated heart failure. EF of 45% with LVH NYHA Class: II.  Volume overloaded.  Lost more than 4 L so far continue with IV diuresis- bumex 1 mg bid/ metolazone 2.5 mg bid-  Please continue Gudelines recommended medical thearpy including coreg and losartan daily. Daily weights, strict Is and O's  Fluid restriction 1800 ml- low sodium diet Stop Pletal due to CHF, get labs still pending from today'. Continue to monitor, stable. 12/25/20no chest pains no SOB. Hb is low today will check stool for occult blood and also get anemia panel. Could it be due to dilution. He has actualy lost weight.      Peripheral vascular disease with gangrenous changes to the left great toe:  12/22/20--Per Dr. Arellano -Pt with auto-amputated left great toeWill consult vascular surgery for evaluation and possible CO2 angio due to worsening kidney function. Pt needs amputation of the toe once vascular anatomy improves otherwise will need BKA. -Patient received vancomycin, ceftriaxone in the ER-Arterial Dopplers-12/11/2020- demonstrated dampened waveforms left lower extremity with high-grade stenosis involving the right distal superficial femoral artery and proximal popliteal artery.  -Patient currently not on antibiotics, will trend CRP and procalcitonin cefepime, vancomycin, and Flagyl.  Infectious disease is not available this week. -Obtain wound culture Diabetic foot ulcer on the plantar aspect of left foot  12/23/20- please see detailed angio note.  Further recommendation per cardio and surgery.   12/24/20- surgery wrote ' - plan for OR for left great toe amputation on 12/25 - NPO at midnight 12/24'  12/25/20- to have left toe amputation 12/26/20.      Hypertension with accelerated hypertension at admission  12/22/20--Resume home medications-carvedilol and doxazosin, however, losartan was discontinued for now per nephrology  12/23/20- b/p is 145/76 today.  12/24/20- b/p is 117/52 today. 12/25/20- b/p is 155/56 today.      Diabetes mellitus type 2, on chronic insulin  12/22/20-HbA1c 6.6-12/10/2020 -Patient is currently on insulin sliding scale.  Was taken off of long-acting insulin recently.-Continue insulin sliding scale with hypoglycemia protocol  12/23/20- sugar is 141 today.   12/24/20- sugar is 177 today and continue to monitor. 12/25/20- sugars are 151 today.       Other chronic problems as of 12/22/20  #. Coronary artery disease s/p PCI and stenting-2018  -Continue Plavix, carvedilol, statin     #. Obstructive sleep apnea-not compliant with BiPAP/CPAP     #. Chronic kidney disease stage III     #. Hyperlipidemia-continue atorvastatin      Consultants:  Nephrology  Cardiology  Vascular surgery  gen surgery    General Orders:  Repeat basic labs again in am.  I have explained to the patient and discussed with him/her the treatment plan.   Carlene Barton MD, [facility]ai[facility] Paulson

## 2020-12-25 NOTE — ANESTHESIA PRE PROCEDURE
Department of Anesthesiology  Preprocedure Note       Name:  Govind López   Age:  79 y.o.  :  1950                                          MRN:  [id number]         Date:  2020      Surgeon: Vini Hammonds):  Anne Marie Hamilton MD    Procedure: Procedure(s):  LEFT GREAT TOE AMPUTATION    Medications prior to admission:   Prior to Admission medications    Medication Sig Start Date End Date Taking?  Authorizing Provider   pregabalin (LYRICA) 150 MG capsule TAKE 1 CAPSULE BY MOUTH THREE TIMES A DAY 20  Yes Historical Provider, MD   doxazosin (CARDURA) 2 MG tablet Take 2 tablets by mouth 2 times daily 20  Yes Ofe Key MD   torsemide (DEMADEX) 100 MG tablet TAKE 1 TABLET BY MOUTH TWICE A DAY 20  Yes Ofe Key MD   insulin lispro (HUMALOG) 100 UNIT/ML injection vial Inject 0-6 Units into the skin 3 times daily (with meals) 20  Yes Marlene Bryant MD   atorvastatin (LIPITOR) 40 MG tablet Take 1 tablet by mouth nightly 20  Yes Marlene Bryant MD   losartan (COZAAR) 25 MG tablet Take 1 tablet by mouth daily 20  Yes Marlene Bryant MD   carvedilol (COREG) 3.125 MG tablet Take 1 tablet by mouth 2 times daily 20  Yes Marlene Bryant MD   dicyclomine (BENTYL) 10 MG capsule Take 1 capsule by mouth 4 times daily (before meals and nightly) 20  Yes Marlene Bryant MD   metOLazone (ZAROXOLYN) 5 MG tablet Take 5 mg by mouth daily   Yes Historical Provider, MD   albuterol sulfate HFA (VENTOLIN HFA) 108 (90 Base) MCG/ACT inhaler Inhale 2 puffs into the lungs every 4 hours as needed for Wheezing 20  Yes Denise Calderon MD   clopidogrel (PLAVIX) 75 MG tablet Take 1 tablet by mouth daily 17  Yes Loly Ortiz MD   acetaminophen (TYLENOL) 325 MG tablet Take 2 tablets by mouth every 6 hours as needed for Pain 10/28/20   [facility], DO   Calcium Alginate (ALGICELL CALCIUM DRESSING 4\"X8) MISC Apply 1 Device topically daily 3/20/20 Estrella Moss MD   [facility] COREY WOUND DRESSING MATRIX Apply topically Apply to left daily and cover with gauze 3/19/20   Estrella Moss MD       Current medications:    Current Facility-Administered Medications   Medication Dose Route Frequency Provider Last Rate Last Admin    vancomycin (VANCOCIN) intermittent dosing (placeholder)   Other RX Placeholder Chioma Senior MD        cefepime (MAXIPIME) 2 g IVPB minibag  2 g Intravenous Q12H Chioma Senior MD   Stopped at 12/25/20 0020    metronidazole (FLAGYL) 500 mg in NaCl 100 mL IVPB premix  500 mg Intravenous Q8H Chioma Senior MD   Stopped at 12/25/20 0908    diazePAM (VALIUM) injection 5 mg  5 mg Intravenous Once Patricia Raya MD        sodium chloride flush 0.9 % injection 10 mL  10 mL Intravenous 2 times per day Radha Fenton MD   10 mL at 12/25/20 0909    sodium chloride flush 0.9 % injection 10 mL  10 mL Intravenous PRN Radha Fenton MD        promethazine (PHENERGAN) tablet 12.5 mg  12.5 mg Oral Q6H PRN Radha Fenton MD   12.5 mg at 12/22/20 2058    Or    ondansetron (ZOFRAN) injection 4 mg  4 mg Intravenous Q6H PRN Radha Fenton MD        polyethylene glycol (GLYCOLAX) packet 17 g  17 g Oral Daily PRN Radha Fenton MD        acetaminophen (TYLENOL) tablet 650 mg  650 mg Oral Q6H PRN Radha Fenton MD        Or    acetaminophen (TYLENOL) suppository 650 mg  650 mg Rectal Q6H PRN Radha Fenton MD        bumetanide (BUMEX) injection 1 mg  1 mg Intravenous BID Radha Fenton MD   1 mg at 12/24/20 7522    insulin lispro (HUMALOG) injection vial 0-12 Units  0-12 Units Subcutaneous TID WC Radha Fenton MD   2 Units at 12/24/20 1719    insulin lispro (HUMALOG) injection vial 0-6 Units  0-6 Units Subcutaneous Nightly Radha Fenton MD   2 Units at 12/24/20 2147    glucose (GLUTOSE) 40 % oral gel 15 g  15 g Oral PRN Radha Fenton MD        dextrose 50 % IV solution  12.5 g Intravenous PRN Rene Rosen MD        glucagon (rDNA) injection 1 mg  1 mg Intramuscular PRN Rene Rosen MD        dextrose 5 % solution  100 mL/hr Intravenous PRN Rene Rosen MD        albuterol sulfate  (90 Base) MCG/ACT inhaler 2 puff  2 puff Inhalation Q4H PRN Rene Rosen MD        atorvastatin (LIPITOR) tablet 40 mg  40 mg Oral Nightly Rene Rosen MD   40 mg at 12/24/20 2153    carvedilol (COREG) tablet 3.125 mg  3.125 mg Oral BID Rene Rosen MD   3.125 mg at 12/24/20 2153    clopidogrel (PLAVIX) tablet 75 mg  75 mg Oral Daily Rene Rosen MD   75 mg at 12/24/20 0858    doxazosin (CARDURA) tablet 4 mg  4 mg Oral BID Rene Rosen MD   4 mg at 12/24/20 2153    pregabalin (LYRICA) capsule 150 mg  150 mg Oral BID Rene Rosen MD   150 mg at 12/24/20 2153    heparin (porcine) injection 5,000 Units  5,000 Units Subcutaneous 3 times per day Rene Rosen MD   5,000 Units at 12/25/20 0527    metOLazone (ZAROXOLYN) tablet 2.5 mg  2.5 mg Oral BID Paz Mc MD   2.5 mg at 12/24/20 2153    morphine (PF) injection 2 mg  2 mg Intravenous Q4H PRN Sylvia Jordan MD   2 mg at 12/24/20 2347       Allergies:     Allergies   Allergen Reactions    Pcn [Penicillins] Hives    Fentanyl Itching       Problem List:    Patient Active Problem List   Diagnosis Code    Chronic coronary artery disease I25.10    Automatic implantable cardioverter-defibrillator in situ Z95.810    Chronic combined systolic and diastolic heart failure (HCC) I50.42    Chronic kidney disease, stage III (moderate) N18.30    Mixed hyperlipidemia E78.2    Sick sinus syndrome ([facility] [address] I49.5    Type 2 diabetes mellitus with diabetic polyneuropathy ([facility] [address] E11.42    Spinal stenosis of lumbar region M48.061    Obesity, Class I, BMI 30-34.9 E66.9    Postoperative hypertension I97.3    S/P partial colectomy Z90.49    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    Microalbuminuria R80.9    PVD (peripheral vascular disease) ([facility] I73.9    Limb ischemia I99.8    Necrotic toes ([facility]) I96    Toe gangrene ([facility]) I96    Diabetic foot infection ([facility]) E11.628, L08.9    Chronic kidney disease (CKD) stage G3a/A2, moderately decreased glomerular filtration rate (GFR) between 45-59 mL/min/1.73 square meter and albuminuria creatinine ratio between  mg/g N18.31    DM (diabetes mellitus) ([facility]) E11.9    Edema R60.9    ICD (implantable cardioverter-defibrillator) battery depletion Z45.02    Hyperkalemia E87.5    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[facility]) I96    Ischemia of toe I99.8    Acute kidney injury ([facility]) N17.9    Fluid overload E87.70    DM (diabetes mellitus) ([facility]) E11.9    Hypertensive emergency I16.1    Precordial pain R07.2    Acute chest pain R07.9    Unstable angina ([facility]) I20.0    Chronic kidney disease (CKD) stage G3a/A3, moderately decreased glomerular filtration rate (GFR) between 45-59 mL/min/1.73 square meter and albuminuria creatinine ratio greater than 300 mg/g N18.31    Cardiomyopathy ([facility]) I42.9    Hypertensive crisis I16.9    Diabetic neuropathy ([facility]) E11.40    HTN (hypertension) I10    Epigastric pain R10.13    Heart failure exacerbated by sotalol ([facility]) I50.9    Bilateral lower extremity edema R60.0    Elevated troponin R77.8    Acute on chronic systolic CHF (congestive heart failure) ([facility]) I50.23       Past Medical History:        Diagnosis Date    Acid reflux     Acute MI ([facility] [address] 2004, 2008    Arthritis     Back    Broken teeth     Upper Front    CAD (coronary artery disease)     Sees Dr. Chente Cottrell [facility])     per old chart    CHF (congestive heart failure) ([facility])     Chronic back pain     Chronic kidney disease     STAGE 3 KIDNEY FAILURE- \"from my diabetes- do not follow with any one- have seen Dr Gregorio Barksdale in the past\"    Diabetes mellitus ([facility] [address] Dx 1965    per old chart pt has been diabetic since age 14    Diabetic neuropathy ([facility] [address]     \"in my feet\"    H/O cardiovascular stress test 08/25/2016    H/O Doppler ultrasound 09/27/2018    Moderate disease of the right lower extremity with an JALEN of 0.72.   Moderate to severe disease of the left lower extremity with an JALEN of 0.55.   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    Neuropathy     both feet    neuropathy     PAD (peripheral artery disease) ([facility] [address] 09/27/2018    PVD (peripheral vascular disease) ([facility] [address]     Sick sinus syndrome ([facility] [address]     Sleep apnea     \"sleep study 3 yrs ago- could not tolerate the cpap made me too dry\"    Spinal stenosis     Teeth missing     Upper And Lower    Type 2 diabetes mellitus without complication [facility])        Past Surgical History:        Procedure Laterality Date    CARDIAC CATHETERIZATION      per old chart done 10/2014    CARDIAC CATHETERIZATION  07/14/2017    with angiography of leg    CARDIAC CATHETERIZATION  11/20/2018    PATENT STENTS OF ALL THREE MAJOR VESSELS    CARDIAC DEFIBRILLATOR PLACEMENT  06/04/2010    [employer] Seceusebio DIAZ Defibrillator Implanted    COLECTOMY Right 08/26/2016    laparascopic; robotic assisted    COLONOSCOPY  08/04/2016    CORONARY ANGIOPLASTY      \"15 Heart Stents\"    CORONARY ANGIOPLASTY WITH STENT PLACEMENT      per old chart had angio with stent to circumflex and obtuse marginal artery at [facility] 5/2010( old chart also gives hx of stent placement done 2000,2004 and 2005)    DENTAL SURGERY      Teeth Extracted In Past    PACEMAKER PLACEMENT  06/04/2010    [employer] Secura  Defibrillator Implanted    TOE AMPUTATION Right 09/12/2017    Rt 3rd toe    TOE AMPUTATION Right 01/09/2018     Right 5th toe amputation and Toenails trimmed left 2,3,4 and 5th toes    VASCULAR SURGERY      per old chart had balloon angioplasty right superfical femoral artery,right popliteal artery,,right ant.tibial artery, right tibioperoneal trunk, and right post.tibial artery wna stent placement right popliteal artery and superfical femoral artery 7/2012       Social History:    Social History     Tobacco Use    Smoking status: Former Smoker     Packs/day: 0.00     Years: 36.00     Pack years: 0.00     Types: Cigars     Start date: 1/1/1980    Smokeless tobacco: Never Used   Substance Use Topics    Alcohol use: No     Frequency: Never                                Counseling given: Not Answered      Vital Signs (Current):   Vitals:    12/25/20 0438 12/25/20 0518 12/25/20 0532 12/25/20 0915   BP:  (!) 152/58  (!) 155/55   Pulse: 67 61  65   Resp: 16 12  17   Temp:  36.3 °C (97.3 °F)  36.9 °C (98.4 °F)   TempSrc:  Oral  Oral   SpO2:  99%  99%   Weight:   277 lb 9 oz (125.9 kg)    Height:                                                  BP Readings from Last 3 Encounters:   12/25/20 (!) 155/55   12/17/20 (!) 142/90   12/13/20 (!) 175/79       NPO Status: Time of last liquid consumption: 2300                        Time of last solid consumption: 2300                        Date of last liquid consumption: 12/22/20                        Date of last solid food consumption: 12/23/20    BMI:   Wt Readings from Last 3 Encounters:   12/25/20 277 lb 9 oz (125.9 kg)   12/17/20 293 lb 4.8 oz (133 kg)   12/13/20 293 lb 4.8 oz (133 kg)     Body mass index is 37.64 kg/m².     CBC:   Lab Results   Component Value Date    WBC 6.2 12/25/2020    RBC 3.07 12/25/2020    HGB 8.7 12/25/2020    HCT 28.9 12/25/2020    MCV 94.1 12/25/2020    RDW 14.2 12/25/2020     12/25/2020       CMP:   Lab Results   Component Value Date     12/25/2020    K 4.6 12/25/2020 Plan      MAC     ASA 4     ( Pre Anesthesia Assessment complete. Chart reviewed on 12/25/2020)  Induction: intravenous. MIPS: Postoperative opioids intended. Anesthetic plan and risks discussed with patient. Plan discussed with CRNA.     Attending anesthesiologist reviewed and agrees with MICKY Estes - WILLIAM   12/25/2020

## 2020-12-25 NOTE — PROGRESS NOTES
Cardiology Progress Note     Today's Plan cpm    Admit Date:  12/20/2020    Consult reason/ Seen today for: shortness of breath - non healing left foot great toe ulcer    Subjective and  Overnight Events:  Reports his breathing has improved- continues with scrotal and leg edema. S/p PCI of the left 100% occluded SFA by CT surgery yesterday  Lost 5.4 litres    Assessment / Plan / Recommendation:     1. Acute on chronic combined decompensated heart failure. EF of 45% with LVH NYHA Class: II.  Volume overloaded. Lost more than 5 L so far continue with IV diuresis- bumex 1 mg bid/ stop metalazone   Please continue Gudelines recommended medical thearpy including coreg and losartan daily. Daily weights, strict Is and O's  Fluid restriction 1800 ml- low sodium diet   2. Stop Pletal due to CHF, get labs still pending from today  3. PAD-underwent left leg PCI by CT surgery continue aspirin Plavix  4. Gangrenous left toe plan for amputation as per Dr. Riya Tse  5. CAD- stable -has chronic elevation of troponin- continue BB and plavix  6. non healing ulcer to left great toe- has seen surgery (Dr Bubba Zelaya) -possible amputation   7. Uncontrolled HTN:bp improved- continue with losartan           History of Presenting Illness:    Chief complain on admission : 79 y. o.year old who is admitted for  Chief Complaint   Patient presents with    Shortness of Breath     Worsening tonight, worse with movement    Groin Swelling     x2wks    Wound Infection     Left foot amputation per Dr. Bubba Zelaya.          Past medical history:    has a past medical history of Acid reflux, Acute MI ([address] Arthritis, Broken teeth, CAD (coronary artery disease), Cardiomyopathy ([address] CHF (congestive heart failure) ([address] Chronic back pain, Chronic kidney disease, Diabetes mellitus ([address] Diabetic neuropathy ([address] H/O cardiovascular stress test, H/O Doppler ultrasound, H/O percutaneous left heart catheterization, History of irregular heartbeat, History of syncope, Hyperlipidemia, Hypertension, Leg swelling, Necrotic toes (HCC), Neuropathy, neuropathy, PAD (peripheral artery disease) ([facility] PVD (peripheral vascular disease) ([facility] Sick sinus syndrome ([facility] Sleep apnea, Spinal stenosis, Teeth missing, and Type 2 diabetes mellitus without complication ([facility] Past surgical history:   has a past surgical history that includes Coronary angioplasty with stent; Dental surgery; Colonoscopy (08/04/2016); pacemaker placement (06/04/2010); vascular surgery; colectomy (Right, 08/26/2016); Toe amputation (Right, 09/12/2017); Toe amputation (Right, 01/09/2018); Cardiac catheterization; Cardiac defibrillator placement (06/04/2010); Coronary angioplasty; Cardiac catheterization (07/14/2017); and Cardiac catheterization (11/20/2018). Social History:   reports that he has quit smoking. His smoking use included cigars. He started smoking about 41 years ago. He smoked 0.00 packs per day for 36.00 years. He has never used smokeless tobacco. He reports current drug use. Drug: Marijuana. He reports that he does not drink alcohol. Family history:  family history includes Cancer in his brother, father, and son; Diabetes in his brother, mother, and sister; Early Death (age of onset: 62) in his sister; Heart Disease in his brother and sister; High Blood Pressure in his brother, brother, and mother; Neuropathy in his sister; Other in his sister; Stroke in his mother; Vision Loss in his mother.     Allergies   Allergen Reactions    Pcn [Penicillins] Hives    Fentanyl Itching       Review of Systems:   All 14 systems were reviewed and are negative  Except for the positive findings which are documented     BP (!) 155/55   Pulse 76   Temp 97.8 °F (36.6 °C) (Oral)   Resp 16   Ht 6' (1.829 m)   Wt 277 lb 9 oz (125.9 kg)   SpO2 91%   BMI 37.64 kg/m²       Intake/Output Summary (Last 24 hours) at 12/25/2020 1212  Last data filed at 12/25/2020 0500  Gross per 24 hour   Intake 150 ml   Output 1540 ml   Net -1390 ml       Physical Exam:  Constitutional:  Well developed, No acute distress  HENT:  Normocephalic, Atraumatic, Bilateral external ears normal,  Nose normal.   Neck- trachea is midline  Eyes:  PEERL, Conjunctiva normal  Respiratory: decreased breath sounds, No respiratory distress, No wheezing, No chest tenderness. Cardiovascular:  Normal heart rate, Normal rhythm, no murmurs appreciated, No rubs appreciated, No gallops appreciated, JVP not elevated  Abdomen/GI:  Soft, No tenderness  Musculoskeletal:  Intact distal pulses, + edema to lower legs, + tenderness, No cyanosis, No clubbing. - scrotal swelling   Integument:  Warm, Dry- ulcer to left great toe   Lymphatic:  No lymphadenopathy noted.    Neurologic:  Alert & oriented  Psychiatric:  Affect and Mood :pleasant     Medications:    vancomycin (VANCOCIN) intermittent dosing (placeholder)   Other RX Placeholder    cefepime  2 g Intravenous Q12H    metroNIDAZOLE  500 mg Intravenous Q8H    diazePAM  5 mg Intravenous Once    sodium chloride flush  10 mL Intravenous 2 times per day    bumetanide  1 mg Intravenous BID    insulin lispro  0-12 Units Subcutaneous TID WC    insulin lispro  0-6 Units Subcutaneous Nightly    atorvastatin  40 mg Oral Nightly    carvedilol  3.125 mg Oral BID    clopidogrel  75 mg Oral Daily    doxazosin  4 mg Oral BID    pregabalin  150 mg Oral BID    heparin (porcine)  5,000 Units Subcutaneous 3 times per day    metOLazone  2.5 mg Oral BID      dextrose       sodium chloride flush, promethazine **OR** ondansetron, polyethylene glycol, acetaminophen **OR** acetaminophen, glucose, dextrose, glucagon (rDNA), dextrose, albuterol sulfate HFA, morphine    Lab Data:  CBC:   Recent Labs     12/24/20  0943 12/25/20  0311   WBC 5.6 6.2   HGB 9.6* 8.7*   HCT 32.4* 28.9*   MCV 95.9 94.1    165     BMP:   Recent Labs     12/23/20  0778 12/24/20  0943 12/25/20  0311    144 143   K 4.5 4.6 4.6    103 103   CO2 30 31 31   PHOS 3.1  --   --    BUN 22 26* 29*   CREATININE 1.9* 2.0* 1.9*     PT/INR: No results for input(s): PROTIME, INR in the last 72 hours. BNP:    No results for input(s): PROBNP in the last 72 hours. TROPONIN:   No results for input(s): TROPONINT in the last 72 hours. Impression:  Active Problems:    Chronic coronary artery disease    Automatic implantable cardioverter-defibrillator in situ    Chronic kidney disease, stage III (moderate)    PVD (peripheral vascular disease) ([facility])    Acute on chronic systolic CHF (congestive heart failure) ([address]  Resolved Problems:    * No resolved hospital problems. *       All labs, medications and tests reviewed by myself, continue all other medications of all above medical condition listed as is except for changes mentioned above. Thank you   Please call with questions.     Electronically signed by [address] [place] [address] Avenue, MD on 12/25/2020 at 12:12 PM

## 2020-12-26 ENCOUNTER — ANESTHESIA (OUTPATIENT)
Dept: OPERATING ROOM | Age: 70
DRG: 252 | End: 2020-12-26
Payer: MEDICARE

## 2020-12-26 VITALS
OXYGEN SATURATION: 98 % | DIASTOLIC BLOOD PRESSURE: 67 MMHG | RESPIRATION RATE: 1 BRPM | SYSTOLIC BLOOD PRESSURE: 129 MMHG

## 2020-12-26 LAB
ANION GAP SERPL CALCULATED.3IONS-SCNC: 9 MMOL/L (ref 4–16)
BUN BLDV-MCNC: 29 MG/DL (ref 6–23)
CALCIUM SERPL-MCNC: 7.9 MG/DL (ref 8.3–10.6)
CHLORIDE BLD-SCNC: 102 MMOL/L (ref 99–110)
CO2: 32 MMOL/L (ref 21–32)
CREAT SERPL-MCNC: 1.9 MG/DL (ref 0.9–1.3)
CULTURE: NORMAL
CULTURE: NORMAL
DOSE AMOUNT: NORMAL
DOSE TIME: NORMAL
GFR AFRICAN AMERICAN: 43 ML/MIN/1.73M2
GFR NON-AFRICAN AMERICAN: 35 ML/MIN/1.73M2
GLUCOSE BLD-MCNC: 134 MG/DL (ref 70–99)
GLUCOSE BLD-MCNC: 158 MG/DL (ref 70–99)
GLUCOSE BLD-MCNC: 187 MG/DL (ref 70–99)
GLUCOSE BLD-MCNC: 190 MG/DL (ref 70–99)
HCT VFR BLD CALC: 30.2 % (ref 42–52)
HEMOGLOBIN: 9.4 GM/DL (ref 13.5–18)
HIGH SENSITIVE C-REACTIVE PROTEIN: 52.4 MG/L
Lab: NORMAL
Lab: NORMAL
MAGNESIUM: 1.7 MG/DL (ref 1.8–2.4)
MCH RBC QN AUTO: 29 PG (ref 27–31)
MCHC RBC AUTO-ENTMCNC: 31.1 % (ref 32–36)
MCV RBC AUTO: 93.2 FL (ref 78–100)
PDW BLD-RTO: 14.1 % (ref 11.7–14.9)
PLATELET # BLD: 181 K/CU MM (ref 140–440)
PMV BLD AUTO: 10.9 FL (ref 7.5–11.1)
POTASSIUM SERPL-SCNC: 4.4 MMOL/L (ref 3.5–5.1)
RBC # BLD: 3.24 M/CU MM (ref 4.6–6.2)
SODIUM BLD-SCNC: 143 MMOL/L (ref 135–145)
SPECIMEN: NORMAL
SPECIMEN: NORMAL
VANCOMYCIN TROUGH: 12.9 UG/ML (ref 10–20)
WBC # BLD: 5.7 K/CU MM (ref 4–10.5)

## 2020-12-26 PROCEDURE — 6360000002 HC RX W HCPCS: Performed by: SURGERY

## 2020-12-26 PROCEDURE — 6360000002 HC RX W HCPCS: Performed by: NURSE ANESTHETIST, CERTIFIED REGISTERED

## 2020-12-26 PROCEDURE — 80048 BASIC METABOLIC PNL TOTAL CA: CPT

## 2020-12-26 PROCEDURE — 99232 SBSQ HOSP IP/OBS MODERATE 35: CPT | Performed by: INTERNAL MEDICINE

## 2020-12-26 PROCEDURE — 6360000002 HC RX W HCPCS: Performed by: INTERNAL MEDICINE

## 2020-12-26 PROCEDURE — B41G1ZZ FLUOROSCOPY OF LEFT LOWER EXTREMITY ARTERIES USING LOW OSMOLAR CONTRAST: ICD-10-PCS | Performed by: SURGERY

## 2020-12-26 PROCEDURE — 2500000003 HC RX 250 WO HCPCS: Performed by: SURGERY

## 2020-12-26 PROCEDURE — 2580000003 HC RX 258: Performed by: INTERNAL MEDICINE

## 2020-12-26 PROCEDURE — 2500000003 HC RX 250 WO HCPCS: Performed by: NURSE ANESTHETIST, CERTIFIED REGISTERED

## 2020-12-26 PROCEDURE — 2580000003 HC RX 258: Performed by: NURSE ANESTHETIST, CERTIFIED REGISTERED

## 2020-12-26 PROCEDURE — 2580000003 HC RX 258: Performed by: SURGERY

## 2020-12-26 PROCEDURE — 3700000001 HC ADD 15 MINUTES (ANESTHESIA): Performed by: SURGERY

## 2020-12-26 PROCEDURE — 2140000000 HC CCU INTERMEDIATE R&B

## 2020-12-26 PROCEDURE — 3700000000 HC ANESTHESIA ATTENDED CARE: Performed by: SURGERY

## 2020-12-26 PROCEDURE — 85027 COMPLETE CBC AUTOMATED: CPT

## 2020-12-26 PROCEDURE — 88311 DECALCIFY TISSUE: CPT

## 2020-12-26 PROCEDURE — 2500000003 HC RX 250 WO HCPCS: Performed by: INTERNAL MEDICINE

## 2020-12-26 PROCEDURE — APPSS60 APP SPLIT SHARED TIME 46-60 MINUTES: Performed by: NURSE PRACTITIONER

## 2020-12-26 PROCEDURE — 36415 COLL VENOUS BLD VENIPUNCTURE: CPT

## 2020-12-26 PROCEDURE — 6370000000 HC RX 637 (ALT 250 FOR IP): Performed by: NURSE PRACTITIONER

## 2020-12-26 PROCEDURE — 80202 ASSAY OF VANCOMYCIN: CPT

## 2020-12-26 PROCEDURE — 83735 ASSAY OF MAGNESIUM: CPT

## 2020-12-26 PROCEDURE — 6370000000 HC RX 637 (ALT 250 FOR IP): Performed by: SURGERY

## 2020-12-26 PROCEDURE — 2700000000 HC OXYGEN THERAPY PER DAY

## 2020-12-26 PROCEDURE — 88305 TISSUE EXAM BY PATHOLOGIST: CPT

## 2020-12-26 PROCEDURE — 2709999900 HC NON-CHARGEABLE SUPPLY: Performed by: SURGERY

## 2020-12-26 PROCEDURE — 3600000002 HC SURGERY LEVEL 2 BASE: Performed by: SURGERY

## 2020-12-26 PROCEDURE — 94761 N-INVAS EAR/PLS OXIMETRY MLT: CPT

## 2020-12-26 PROCEDURE — 0Y6Q0Z0 DETACHMENT AT LEFT 1ST TOE, COMPLETE, OPEN APPROACH: ICD-10-PCS | Performed by: SURGERY

## 2020-12-26 PROCEDURE — 82962 GLUCOSE BLOOD TEST: CPT

## 2020-12-26 PROCEDURE — 86141 C-REACTIVE PROTEIN HS: CPT

## 2020-12-26 PROCEDURE — 3600000012 HC SURGERY LEVEL 2 ADDTL 15MIN: Performed by: SURGERY

## 2020-12-26 PROCEDURE — 28810 AMPUTATION TOE & METATARSAL: CPT | Performed by: SURGERY

## 2020-12-26 RX ORDER — OXYCODONE HYDROCHLORIDE 5 MG/1
5 TABLET ORAL EVERY 4 HOURS PRN
Status: DISCONTINUED | OUTPATIENT
Start: 2020-12-26 | End: 2020-12-27 | Stop reason: HOSPADM

## 2020-12-26 RX ORDER — MORPHINE SULFATE 2 MG/ML
2 INJECTION, SOLUTION INTRAMUSCULAR; INTRAVENOUS EVERY 4 HOURS PRN
Status: DISCONTINUED | OUTPATIENT
Start: 2020-12-26 | End: 2020-12-27 | Stop reason: HOSPADM

## 2020-12-26 RX ORDER — LIDOCAINE HYDROCHLORIDE 20 MG/ML
INJECTION, SOLUTION INTRAVENOUS PRN
Status: DISCONTINUED | OUTPATIENT
Start: 2020-12-26 | End: 2020-12-26 | Stop reason: SDUPTHER

## 2020-12-26 RX ORDER — PROPOFOL 10 MG/ML
INJECTION, EMULSION INTRAVENOUS PRN
Status: DISCONTINUED | OUTPATIENT
Start: 2020-12-26 | End: 2020-12-26 | Stop reason: SDUPTHER

## 2020-12-26 RX ORDER — CEFAZOLIN SODIUM 2 G/50ML
SOLUTION INTRAVENOUS PRN
Status: DISCONTINUED | OUTPATIENT
Start: 2020-12-26 | End: 2020-12-26 | Stop reason: SDUPTHER

## 2020-12-26 RX ORDER — ONDANSETRON 2 MG/ML
INJECTION INTRAMUSCULAR; INTRAVENOUS PRN
Status: DISCONTINUED | OUTPATIENT
Start: 2020-12-26 | End: 2020-12-26 | Stop reason: SDUPTHER

## 2020-12-26 RX ORDER — BUPIVACAINE HYDROCHLORIDE 5 MG/ML
INJECTION, SOLUTION EPIDURAL; INTRACAUDAL
Status: COMPLETED | OUTPATIENT
Start: 2020-12-26 | End: 2020-12-26

## 2020-12-26 RX ORDER — MAGNESIUM OXIDE 400 MG/1
400 TABLET ORAL ONCE
Status: COMPLETED | OUTPATIENT
Start: 2020-12-26 | End: 2020-12-26

## 2020-12-26 RX ORDER — SODIUM CHLORIDE, SODIUM LACTATE, POTASSIUM CHLORIDE, CALCIUM CHLORIDE 600; 310; 30; 20 MG/100ML; MG/100ML; MG/100ML; MG/100ML
INJECTION, SOLUTION INTRAVENOUS CONTINUOUS PRN
Status: DISCONTINUED | OUTPATIENT
Start: 2020-12-26 | End: 2020-12-26 | Stop reason: SDUPTHER

## 2020-12-26 RX ADMIN — MORPHINE SULFATE 2 MG: 2 INJECTION, SOLUTION INTRAMUSCULAR; INTRAVENOUS at 15:28

## 2020-12-26 RX ADMIN — BUMETANIDE 1 MG: 0.25 INJECTION INTRAMUSCULAR; INTRAVENOUS at 20:36

## 2020-12-26 RX ADMIN — PREGABALIN 150 MG: 75 CAPSULE ORAL at 20:38

## 2020-12-26 RX ADMIN — SODIUM CHLORIDE, POTASSIUM CHLORIDE, SODIUM LACTATE AND CALCIUM CHLORIDE: 600; 310; 30; 20 INJECTION, SOLUTION INTRAVENOUS at 07:40

## 2020-12-26 RX ADMIN — DEXMEDETOMIDINE 80 MCG: 100 INJECTION, SOLUTION, CONCENTRATE INTRAVENOUS at 07:45

## 2020-12-26 RX ADMIN — METRONIDAZOLE 500 MG: 500 INJECTION, SOLUTION INTRAVENOUS at 09:00

## 2020-12-26 RX ADMIN — PROPOFOL 10 MG: 10 INJECTION, EMULSION INTRAVENOUS at 07:56

## 2020-12-26 RX ADMIN — SODIUM CHLORIDE, PRESERVATIVE FREE 10 ML: 5 INJECTION INTRAVENOUS at 20:35

## 2020-12-26 RX ADMIN — MORPHINE SULFATE 2 MG: 2 INJECTION, SOLUTION INTRAMUSCULAR; INTRAVENOUS at 20:36

## 2020-12-26 RX ADMIN — HEPARIN SODIUM 5000 UNITS: 5000 INJECTION INTRAVENOUS; SUBCUTANEOUS at 20:36

## 2020-12-26 RX ADMIN — MORPHINE SULFATE 2 MG: 2 INJECTION, SOLUTION INTRAMUSCULAR; INTRAVENOUS at 01:37

## 2020-12-26 RX ADMIN — LIDOCAINE HYDROCHLORIDE 30 MG: 20 INJECTION, SOLUTION INTRAVENOUS at 07:48

## 2020-12-26 RX ADMIN — DEXMEDETOMIDINE 40 MCG: 100 INJECTION, SOLUTION, CONCENTRATE INTRAVENOUS at 07:56

## 2020-12-26 RX ADMIN — ONDANSETRON 4 MG: 2 INJECTION INTRAMUSCULAR; INTRAVENOUS at 07:45

## 2020-12-26 RX ADMIN — CEFEPIME HYDROCHLORIDE 2 G: 2 INJECTION, POWDER, FOR SOLUTION INTRAVENOUS at 00:14

## 2020-12-26 RX ADMIN — CARVEDILOL 3.12 MG: 3.12 TABLET, FILM COATED ORAL at 08:59

## 2020-12-26 RX ADMIN — PREGABALIN 150 MG: 75 CAPSULE ORAL at 08:59

## 2020-12-26 RX ADMIN — DOXAZOSIN 4 MG: 4 TABLET ORAL at 09:00

## 2020-12-26 RX ADMIN — VANCOMYCIN HYDROCHLORIDE 1500 MG: 5 INJECTION, POWDER, LYOPHILIZED, FOR SOLUTION INTRAVENOUS at 20:35

## 2020-12-26 RX ADMIN — HEPARIN SODIUM 5000 UNITS: 5000 INJECTION INTRAVENOUS; SUBCUTANEOUS at 13:00

## 2020-12-26 RX ADMIN — CEFAZOLIN SODIUM 2 G: 2 SOLUTION INTRAVENOUS at 07:45

## 2020-12-26 RX ADMIN — BUMETANIDE 1 MG: 0.25 INJECTION INTRAMUSCULAR; INTRAVENOUS at 13:00

## 2020-12-26 RX ADMIN — HEPARIN SODIUM 5000 UNITS: 5000 INJECTION INTRAVENOUS; SUBCUTANEOUS at 05:58

## 2020-12-26 RX ADMIN — SODIUM CHLORIDE, PRESERVATIVE FREE 10 ML: 5 INJECTION INTRAVENOUS at 09:00

## 2020-12-26 RX ADMIN — METRONIDAZOLE 500 MG: 500 INJECTION, SOLUTION INTRAVENOUS at 01:34

## 2020-12-26 RX ADMIN — CEFEPIME HYDROCHLORIDE 2 G: 2 INJECTION, POWDER, FOR SOLUTION INTRAVENOUS at 13:00

## 2020-12-26 RX ADMIN — ATORVASTATIN CALCIUM 40 MG: 40 TABLET, FILM COATED ORAL at 20:38

## 2020-12-26 RX ADMIN — CLOPIDOGREL BISULFATE 75 MG: 75 TABLET ORAL at 09:00

## 2020-12-26 RX ADMIN — METRONIDAZOLE 500 MG: 500 INJECTION, SOLUTION INTRAVENOUS at 17:44

## 2020-12-26 RX ADMIN — MAGNESIUM OXIDE 400 MG (241.3 MG MAGNESIUM) TABLET 400 MG: TABLET at 13:00

## 2020-12-26 RX ADMIN — CARVEDILOL 3.12 MG: 3.12 TABLET, FILM COATED ORAL at 20:38

## 2020-12-26 RX ADMIN — PROPOFOL 20 MG: 10 INJECTION, EMULSION INTRAVENOUS at 08:16

## 2020-12-26 RX ADMIN — PROPOFOL 10 MG: 10 INJECTION, EMULSION INTRAVENOUS at 07:48

## 2020-12-26 RX ADMIN — DOXAZOSIN 4 MG: 4 TABLET ORAL at 20:37

## 2020-12-26 RX ADMIN — PROPOFOL 10 MG: 10 INJECTION, EMULSION INTRAVENOUS at 07:52

## 2020-12-26 ASSESSMENT — PULMONARY FUNCTION TESTS
PIF_VALUE: 0
PIF_VALUE: 1
PIF_VALUE: 0
PIF_VALUE: 0
PIF_VALUE: 2
PIF_VALUE: 0

## 2020-12-26 ASSESSMENT — PAIN SCALES - GENERAL
PAINLEVEL_OUTOF10: 0
PAINLEVEL_OUTOF10: 7
PAINLEVEL_OUTOF10: 7

## 2020-12-26 ASSESSMENT — PAIN DESCRIPTION - DESCRIPTORS: DESCRIPTORS: BURNING;STABBING

## 2020-12-26 ASSESSMENT — PAIN DESCRIPTION - ORIENTATION: ORIENTATION: LEFT

## 2020-12-26 ASSESSMENT — PAIN DESCRIPTION - ONSET
ONSET: ON-GOING
ONSET: ON-GOING

## 2020-12-26 ASSESSMENT — PAIN DESCRIPTION - LOCATION
LOCATION: TOE (COMMENT WHICH ONE)
LOCATION: TOE (COMMENT WHICH ONE)

## 2020-12-26 ASSESSMENT — PAIN DESCRIPTION - FREQUENCY: FREQUENCY: CONTINUOUS

## 2020-12-26 ASSESSMENT — PAIN DESCRIPTION - PAIN TYPE: TYPE: CHRONIC PAIN

## 2020-12-26 ASSESSMENT — PAIN - FUNCTIONAL ASSESSMENT: PAIN_FUNCTIONAL_ASSESSMENT: PREVENTS OR INTERFERES SOME ACTIVE ACTIVITIES AND ADLS

## 2020-12-26 NOTE — PROGRESS NOTES
GENERAL SURGERY PROGRESS NOTE    Michelle Penaloza is a 79 y.o. male with left great toe and foot wounds, known to Dr. Sarah uQiñones. Subjective:  Doing ok this morning. Pain well controlled. Denies complaints. No questions regarding surgery today. Objective:    Vitals: VITALS:  BP (!) 149/54   Pulse 72   Temp 97.7 °F (36.5 °C) (Oral)   Resp 9   Ht 6' (1.829 m)   Wt 272 lb 14.4 oz (123.8 kg)   SpO2 98%   BMI 37.01 kg/m²     I/O: 12/25 0701 - 12/26 0700  In: 400   Out: 2600 [Urine:2600]    Labs/Imaging Results:   Lab Results   Component Value Date     12/26/2020    K 4.4 12/26/2020    K 5.1 01/10/2018     12/26/2020    CO2 32 12/26/2020    BUN 29 12/26/2020    CREATININE 1.9 12/26/2020    GLUCOSE 134 12/26/2020    CALCIUM 7.9 12/26/2020      Lab Results   Component Value Date    WBC 5.7 12/26/2020    HGB 9.4 (L) 12/26/2020    HCT 30.2 (L) 12/26/2020    MCV 93.2 12/26/2020     12/26/2020       IV Fluids: dextrose    Scheduled Meds:   clindamycin (CLEOCIN) IV, 600 mg, Intravenous, On Call to OR    vancomycin (VANCOCIN) intermittent dosing (placeholder), , Other, RX Placeholder    cefepime, 2 g, Intravenous, Q12H    metroNIDAZOLE, 500 mg, Intravenous, Q8H    diazePAM, 5 mg, Intravenous, Once    sodium chloride flush, 10 mL, Intravenous, 2 times per day    bumetanide, 1 mg, Intravenous, BID    insulin lispro, 0-12 Units, Subcutaneous, TID WC    insulin lispro, 0-6 Units, Subcutaneous, Nightly    atorvastatin, 40 mg, Oral, Nightly    carvedilol, 3.125 mg, Oral, BID    clopidogrel, 75 mg, Oral, Daily    doxazosin, 4 mg, Oral, BID    pregabalin, 150 mg, Oral, BID    heparin (porcine), 5,000 Units, Subcutaneous, 3 times per day    Physical Exam:  General: A&O x 3, no distress. HEENT: Anicteric sclerae, MMM. Extremities: Right groin site clean and dry. Left great to with dry gangrene of the tip. Left lateral foot with ruptured blister, minimal erythema or edema.   Abdomen: Cardiomyopathy ([facility]     Hypertensive crisis     Diabetic neuropathy (HCC)     HTN (hypertension)     Epigastric pain     Heart failure exacerbated by sotalol (HCC)     Bilateral lower extremity edema     Elevated troponin     Acute on chronic systolic CHF (congestive heart failure) ([facility])      - NPO  - to OR today for left great toe amputation    Ratna Robledo MD

## 2020-12-26 NOTE — ANESTHESIA POSTPROCEDURE EVALUATION
Department of Anesthesiology  Postprocedure Note    Patient: Jennifer Pop  MRN: [id number]  YOB: 1950  Date of evaluation: 12/26/2020  Time:  9:33 AM     Procedure Summary     Date: 12/26/20 Room / Location: [address]    Anesthesia Start: 0740 Anesthesia Stop: 0830    Procedure: LEFT GREAT TOE AMPUTATION (Left Toes) Diagnosis: (-)    Surgeons: Hans Partida MD Responsible Provider: Tigre Moore MD    Anesthesia Type: MAC ASA Status: 4          Anesthesia Type: MAC    Talat Phase I:      Talat Phase II:      Last vitals: Reviewed and per EMR flowsheets.        Anesthesia Post Evaluation    Patient location during evaluation: floor  Patient participation: complete - patient participated  Level of consciousness: awake  Pain score: 3  Airway patency: patent  Nausea & Vomiting: no vomiting and no nausea  Complications: no  Cardiovascular status: blood pressure returned to baseline and hemodynamically stable  Respiratory status: acceptable  Hydration status: stable

## 2020-12-26 NOTE — CONSULTS
Department of Cardiovascular & Thoracic Surgery   Consult Note    Reason for Consult: Mitral regurgitation congestive heart failure    Requesting Physician: Dr. Judy Raman    Date of Consult: 12/26/20      History Obtained From:  patient     HISTORY OF PRESENT ILLNESS:    The patient is a 79 y.o. male who presents with history of not feeling well short of breath and tired  Physical management heart failure and has been on dialysis  She is been previously assessed for a mitral valve placement and is being prepared on the schedule for surgery. Past Medical History:        Diagnosis Date    Acid reflux     Acute MI ([address] 2004, 2008    Arthritis     Back    Broken teeth     Upper Front    CAD (coronary artery disease)     Sees Dr. Timoteo Cook [facility])     per old chart    CHF [facility]estive heart failure) [facility]     Chronic back pain     Chronic kidney disease     STAGE 3 KIDNEY FAILURE- \"from my diabetes- do not follow with any one- have seen Dr Genny Woods in the past\"    Diabetes mellitus ([facility] Dx 1965    per old chart pt has been diabetic since age 13    Diabetic neuropathy ([facility]     \"in my feet\"    H/O cardiovascular stress test 08/25/2016    H/O Doppler ultrasound 09/27/2018    Moderate disease of the right lower extremity with an JALEN of 0.72.   Moderate to severe disease of the left lower extremity with an JALEN of 0.55.   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    Neuropathy     both feet    neuropathy     PAD (peripheral artery disease) ([address] 09/27/2018    PVD (peripheral vascular disease) ([facility]     Sick sinus syndrome (HCC)     Sleep apnea     \"sleep study 3 yrs ago- could not tolerate the cpap made me too dry\"    Spinal stenosis     Teeth missing     Upper And Lower    Type 2 diabetes mellitus without complication ([facility]      Past Surgical History:        Procedure Laterality Date    CARDIAC CATHETERIZATION      per old chart done 10/2014    CARDIAC CATHETERIZATION  07/14/2017    with angiography of leg    CARDIAC CATHETERIZATION  11/20/2018    PATENT STENTS OF ALL THREE MAJOR VESSELS    CARDIAC DEFIBRILLATOR PLACEMENT  06/04/2010    [employer] [address] Defibrillator Implanted    COLECTOMY Right 08/26/2016    laparascopic; robotic assisted    COLONOSCOPY  08/04/2016    CORONARY ANGIOPLASTY      \"15 Heart Stents\"    CORONARY ANGIOPLASTY WITH STENT PLACEMENT      per old chart had angio with stent to circumflex and obtuse marginal artery at [facility] 5/2010( old chart also gives hx of stent placement done 2000,2004 and 2005)   [facility]      Teeth Extracted In Past    PACEMAKER PLACEMENT  06/04/2010    [employer] [address] Defibrillator Implanted    TOE AMPUTATION Right 09/12/2017    Rt 3rd toe    TOE AMPUTATION Right 01/09/2018     Right 5th toe amputation and Toenails trimmed left 2,3,4 and 5th toes    VASCULAR SURGERY      per old chart had balloon angioplasty right superfical femoral artery,right popliteal artery,,right ant.tibial artery, right tibioperoneal trunk, and right post.tibial artery wna stent placement right popliteal artery and superfical femoral artery 7/2012     Current Medications:   Current Facility-Administered Medications: clindamycin (CLEOCIN) 600 mg in dextrose 5 % 50 mL IVPB, 600 mg, Intravenous, On Call to OR  oxyCODONE (ROXICODONE) immediate release tablet 5 mg, 5 mg, Oral, Q4H PRN  morphine (PF) injection 2 mg, 2 mg, Intravenous, Q4H PRN  magnesium oxide (MAG-OX) tablet 400 mg, 400 mg, Oral, Once  vancomycin (VANCOCIN) intermittent dosing (placeholder), , Other, RX Placeholder  cefepime (MAXIPIME) 2 g IVPB minibag, 2 g, Intravenous, Q12H  metronidazole (FLAGYL) 500 mg in NaCl 100 mL IVPB premix, 500 mg, Intravenous, Q8H  diazePAM (VALIUM) injection 5 mg, 5 mg, Intravenous, Once  sodium chloride flush 0.9 % injection 10 mL, 10 mL, Intravenous, 2 times per day  sodium chloride flush 0.9 % injection 10 mL, 10 mL, Intravenous, PRN  promethazine (PHENERGAN) tablet 12.5 mg, 12.5 mg, Oral, Q6H PRN **OR** ondansetron (ZOFRAN) injection 4 mg, 4 mg, Intravenous, Q6H PRN  polyethylene glycol (GLYCOLAX) packet 17 g, 17 g, Oral, Daily PRN  acetaminophen (TYLENOL) tablet 650 mg, 650 mg, Oral, Q6H PRN **OR** acetaminophen (TYLENOL) suppository 650 mg, 650 mg, Rectal, Q6H PRN  bumetanide (BUMEX) injection 1 mg, 1 mg, Intravenous, BID  insulin lispro (HUMALOG) injection vial 0-12 Units, 0-12 Units, Subcutaneous, TID WC  insulin lispro (HUMALOG) injection vial 0-6 Units, 0-6 Units, Subcutaneous, Nightly  glucose (GLUTOSE) 40 % oral gel 15 g, 15 g, Oral, PRN  dextrose 50 % IV solution, 12.5 g, Intravenous, PRN  glucagon (rDNA) injection 1 mg, 1 mg, Intramuscular, PRN  dextrose 5 % solution, 100 mL/hr, Intravenous, PRN  albuterol sulfate  (90 Base) MCG/ACT inhaler 2 puff, 2 puff, Inhalation, Q4H PRN  atorvastatin (LIPITOR) tablet 40 mg, 40 mg, Oral, Nightly  carvedilol (COREG) tablet 3.125 mg, 3.125 mg, Oral, BID  clopidogrel (PLAVIX) tablet 75 mg, 75 mg, Oral, Daily  doxazosin (CARDURA) tablet 4 mg, 4 mg, Oral, BID  pregabalin (LYRICA) capsule 150 mg, 150 mg, Oral, BID  heparin (porcine) injection 5,000 Units, 5,000 Units, Subcutaneous, 3 times per day  morphine (PF) injection 2 mg, 2 mg, Intravenous, Q4H PRN  Allergies: Allergies   Allergen Reactions    Pcn [Penicillins] Hives    Fentanyl Itching       Social History:   Social History     Socioeconomic History    Marital status:       Spouse name: Not on file    Number of children: Not on file    Years of education: Not on file    Highest education level: Not on file   Occupational History    Not on file   Social Needs    Financial resource strain: Not on file    Food insecurity     Worry: Not on file     Inability: Not on file    Transportation needs     Medical: Not on file     Non-medical: Not on file   Tobacco Use    Smoking status: Former Smoker     Packs/day: 0.00     Years: 36.00     Pack years: 0.00     Types: Cigars     Start date: 1/1/1980    Smokeless tobacco: Never Used   Substance and Sexual Activity    Alcohol use: No     Frequency: Never    Drug use: Yes     Types: Marijuana    Sexual activity: Never   Lifestyle    Physical activity     Days per week: Not on file     Minutes per session: Not on file    Stress: Not on file   Relationships    Social connections     Talks on phone: Not on file     Gets together: Not on file     Attends Taoist service: Not on file     Active member of club or organization: Not on file     Attends meetings of clubs or organizations: Not on file     Relationship status: Not on file    Intimate partner violence     Fear of current or ex partner: Not on file     Emotionally abused: Not on file     Physically abused: Not on file     Forced sexual activity: Not on file   Other Topics Concern    Not on file   Social History Narrative    Not on file       Family History:        Problem Relation Age of Onset    Diabetes Mother     Stroke Mother     High Blood Pressure Mother     Vision Loss Mother     Cancer Father         Prostate Cancer    Diabetes Sister     Neuropathy Sister     Other Sister         \"Breathing Problems\"    Heart Disease Sister     Early Death Sister 62        Heart Complications    Cancer Brother         \"Stomach Cancer\"    High Blood Pressure Brother     Diabetes Brother     Heart Disease Brother     High Blood Pressure Brother     Cancer Son         \"Testicle Cancer\"       REVIEW OF SYSTEMS:  Constitutional: Negative for fever, chills, diaphoresis, appetite change and fatigue. HENT: Negative for sore throat, trouble swallowing and voice change. Respiratory: Negative for cough, positive for shortness of breath no wheezing. Cardiovascular: Negative for chest pain positive for SOB with one flight of stairs exertion, no pitting LE edema. Gastrointestinal: Negative for nausea, vomiting, abdominal distention, constipation, no diarrhea, blood in stool, anal bleeding or rectal pain. Musculoskeletal: Negative for joint swelling and arthralgias. Skin: Warm and dry, well perfused. Neurological: Negative for seizures, syncope, speech difficulty and weakness. Hematological/Lymphatic: Negative for adenopathy. No history of DVT/PE. Does not bruise/bleed easily. Psychiatric/Behavioral: Negative for agitation. All others reviewed and negative. EXAM:  Constitutional: Blood pressure (!) 153/75, pulse 61, temperature 98 °F (36.7 °C), temperature source Oral, resp. rate 8, height 6' (1.829 m), weight 272 lb 14.4 oz (123.8 kg), SpO2 98 %. No apparent distress, appears stated age and cooperative. Neurologic: follows commands, no focal weakness noted   Lungs: Good respiratory effort.  Clear to auscultation,   CV: Regular rate/ rhythm , no peripheral edema, feet warm and well perfused  GI: Soft, non-tender in all four quadrants, non-distended, + bowel sounds, liver and spleen no palpable masses  : bladder nondistended   MSK: no obvious deformity   Skin: warm, pink and dry       DATA:  EKG echocardiograms cardiac catheterization films reviewed independently and discussed with Dr. Lindsey Wong as well as Dr. Rebecca Hernandez  Patient Active Problem List   Diagnosis    Chronic coronary artery disease    Automatic implantable cardioverter-defibrillator in situ    Chronic combined systolic and diastolic heart failure (HCC)    Chronic kidney disease, stage III (moderate)    Mixed hyperlipidemia    Sick sinus syndrome (HCC)    Type 2 diabetes mellitus with diabetic polyneuropathy ([facility]    Spinal stenosis of lumbar region    Obesity, Class I, BMI 30-34.9    Postoperative hypertension    S/P partial colectomy    Tubulovillous adenoma of colon    Microalbuminuria    PVD [facility]per[facility]) [facility]    Limb ischemia    Necrotic toes ([facility]    Toe gangrene ([facility])    Diabetic foot infection ([facility]    Chronic kidney disease (CKD) stage G3a/A2, moderately decreased glomerular filtration rate (GFR) between 45-59 mL/min/1.73 square meter and albuminuria creatinine ratio between  mg/g    DM (diabetes mellitus) ([facility]    Edema    ICD (implantable cardioverter-defibrillator) battery depletion    Hyperkalemia    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[facility])    Ischemia of toe    Acute kidney injury ([facility]    Fluid overload    DM (diabetes mellitus) ([facility]    Hypertensive emergency    Precordial pain    Acute chest pain    Unstable angina ([facility]    Chronic kidney disease (CKD) stage G3a/A3, moderately decreased glomerular filtration rate (GFR) between 45-59 mL/min/1.73 square meter and albuminuria creatinine ratio greater than 300 mg/g    Cardiomyopathy ([facility]    Hypertensive crisis    Diabetic neuropathy ([facility]    HTN (hypertension)    Epigastric pain    Heart failure exacerbated by sotalol ([facility])    Bilateral lower extremity edema    Elevated troponin    Acute on chronic systolic CHF (congestive heart failure) ([facility])             RECOMMENDATIONS:  Discussed patient further with regards to plans for mitral replacement considering the underlying health problems and risks involved  She is presently scheduled on January 7 for surgery  Per discussions with the patient and assessment we will maintain the schedule as above  Recommend continue treatment for CHF and ESRD

## 2020-12-26 NOTE — PROGRESS NOTES
Nephrology Progress Note  12/26/2020 3:42 PM        Subjective:   Admit Date: 12/20/2020  PCP: Hilary Collier    Interval History: pt seen in early am , this is a late entry    Diet: good    ROS:  No sob, underwent Rt  Ist toe amp   No cp     Data:     Current med's:    clindamycin (CLEOCIN) IV  600 mg Intravenous On Call to OR    vancomycin (VANCOCIN) intermittent dosing (placeholder)   Other RX Placeholder    cefepime  2 g Intravenous Q12H    metroNIDAZOLE  500 mg Intravenous Q8H    diazePAM  5 mg Intravenous Once    sodium chloride flush  10 mL Intravenous 2 times per day    bumetanide  1 mg Intravenous BID    insulin lispro  0-12 Units Subcutaneous TID WC    insulin lispro  0-6 Units Subcutaneous Nightly    atorvastatin  40 mg Oral Nightly    carvedilol  3.125 mg Oral BID    clopidogrel  75 mg Oral Daily    doxazosin  4 mg Oral BID    pregabalin  150 mg Oral BID    heparin (porcine)  5,000 Units Subcutaneous 3 times per day      dextrose           I/O last 3 completed shifts: In: 1697.4 [I.V.:1297.4; IV Piggyback:400]  Out: 3200 [NXBED:4536]    CBC:   Recent Labs     12/24/20  0943 12/25/20  0311 12/26/20  0245   WBC 5.6 6.2 5.7   HGB 9.6* 8.7* 9.4*    165 181          Recent Labs     12/24/20  0943 12/25/20  0311 12/26/20  0245    143 143   K 4.6 4.6 4.4    103 102   CO2 31 31 32   BUN 26* 29* 29*   CREATININE 2.0* 1.9* 1.9*   GLUCOSE 201* 178* 134*       Lab Results   Component Value Date    CALCIUM 7.9 (L) 12/26/2020    PHOS 3.1 12/23/2020       Objective:     Vitals: BP (!) 149/61   Pulse 60   Temp 98.1 °F (36.7 °C) (Oral)   Resp 13   Ht 6' (1.829 m)   Wt 272 lb 14.4 oz (123.8 kg)   SpO2 96%   BMI 37.01 kg/m²     General appearance:  No distress  HEENT:  + conj pallor  Neck:  supple-- Lt chest wall cardiac device - non tender   Lungs:  No crackles   Heart:  RRR  Abdomen: soft  Extremities:  Less edema .  S/p oe amp   Scrotal  edema better       Problem List : Impression :     1. CKd stage 3 B A3- stable  2. Fluid overload with ADHF better with diureitcs   3. ASCVd S/p angio- PCI- and toe amp  4. Skin/ soft tissue / bone infection   5. Recommendation/Plan  :     1. Ok O keep all diuretic S for now w  2. ]atch for adequate \"capillary  plasma refill' by cr and exam   3. Also faye he is ready may switch  to po torsemide - he has 100 BId at home  And po metolazone with adjustment from time to time - as he goes home he will likely  have more salt   4. Also H is on abx so has risk for NANCY  5. Follow clinically  6. check frequent  level of  abx   7.        Yesica Ortiz MD

## 2020-12-26 NOTE — PROGRESS NOTES
Cardiology Progress Note     Today's Plan cpm    Admit Date:  12/20/2020    Consult reason/ Seen today for: shortness of breath - non healing left foot great toe ulcer    Subjective and  Overnight Events: Today patient underwent amputation of left great toe. Assessment / Plan / Recommendation:     1. Acute on chronic combined decompensated heart failure. EF of 45% with LVH NYHA Class: II. Appears euvolemic  Net -7.1 L so far continue with IV diuresis- bumex 1 mg bid. Please continue Gudelines recommended medical thearpy including coreg and losartan daily. Daily weights, strict Is and O's  Fluid restriction 1800 ml- low sodium diet. Stop Pletal due to CHF. 2. Hypomagnesia: recommend replacement. 3. PAD-underwent left leg PCI by CT surgery continue aspirin Plavix  4. Gangrenous left toe with amputation today  5. CAD- stable -has chronic elevation of troponin- continue BB and plavix  6. Uncontrolled HTN:bp improved- continue with losartan           History of Presenting Illness:    Chief complain on admission : 79 y. o.year old who is admitted for  Chief Complaint   Patient presents with    Shortness of Breath     Worsening tonight, worse with movement    Groin Swelling     x2wks    Wound Infection     Left foot amputation per Dr. Aria Colbert.          Past medical history:    has a past medical history of Acid reflux, Acute MI ([address] Arthritis, Broken teeth, CAD (coronary artery disease), Cardiomyopathy ([address] CHF (congestive heart failure) ([address] Chronic back pain, Chronic kidney disease, Diabetes mellitus ([address] Diabetic neuropathy ([address] H/O cardiovascular stress test, H/O Doppler ultrasound, H/O percutaneous left heart catheterization, History of irregular heartbeat, History of syncope, Hyperlipidemia, Hypertension, Leg swelling, Necrotic toes ([address] Neuropathy, neuropathy, PAD (peripheral artery disease) ([address] PVD (peripheral vascular disease) ([facility] Sick sinus syndrome ([facility] Sleep apnea, Spinal stenosis, Teeth missing, and Type 2 diabetes mellitus without complication ([facility] Past surgical history:   has a past surgical history that includes Coronary angioplasty with stent; Dental surgery; Colonoscopy (08/04/2016); pacemaker placement (06/04/2010); vascular surgery; colectomy (Right, 08/26/2016); Toe amputation (Right, 09/12/2017); Toe amputation (Right, 01/09/2018); Cardiac catheterization; Cardiac defibrillator placement (06/04/2010); Coronary angioplasty; Cardiac catheterization (07/14/2017); and Cardiac catheterization (11/20/2018). Social History:   reports that he has quit smoking. His smoking use included cigars. He started smoking about 41 years ago. He smoked 0.00 packs per day for 36.00 years. He has never used smokeless tobacco. He reports current drug use. Drug: Marijuana. He reports that he does not drink alcohol. Family history:  family history includes Cancer in his brother, father, and son; Diabetes in his brother, mother, and sister; Early Death (age of onset: 62) in his sister; Heart Disease in his brother and sister; High Blood Pressure in his brother, brother, and mother; Neuropathy in his sister; Other in his sister; Stroke in his mother; Vision Loss in his mother.     Allergies   Allergen Reactions    Pcn [Penicillins] Hives    Fentanyl Itching       Review of Systems:   All 14 systems were reviewed and are negative  Except for the positive findings which are documented     BP (!) 153/59   Pulse 60   Temp 98 °F (36.7 °C) (Oral)   Resp 15   Ht 6' (1.829 m)   Wt 272 lb 14.4 oz (123.8 kg)   SpO2 98%   BMI 37.01 kg/m²       Intake/Output Summary (Last 24 hours) at 12/26/2020 0941  Last data filed at 12/26/2020 3760  Gross per 24 hour   Intake 1677.37 ml   Output 2600 ml   Net -922.63 ml       Physical Exam:  Constitutional:  Well developed, No acute distress  HENT:  Normocephalic, Atraumatic, Bilateral external ears normal,  Nose normal.   Neck- trachea is midline  Eyes:  PEERL, Conjunctiva normal  Respiratory: decreased breath sounds, No respiratory distress, No wheezing, No chest tenderness. Cardiovascular:  Normal heart rate, Normal rhythm, no murmurs appreciated, No rubs appreciated, No gallops appreciated, JVP not elevated  Abdomen/GI:  Soft, No tenderness  Musculoskeletal:  Intact distal pulses, + edema to lower legs, + tenderness, No cyanosis, No clubbing. Dressing to left foot   - scrotal swelling   Integument:  Warm, Dry- dressing to left foot   Lymphatic:  No lymphadenopathy noted.    Neurologic:  Alert & oriented  Psychiatric:  Affect and Mood :pleasant     Medications:    clindamycin (CLEOCIN) IV  600 mg Intravenous On Call to OR    vancomycin (VANCOCIN) intermittent dosing (placeholder)   Other RX Placeholder    cefepime  2 g Intravenous Q12H    metroNIDAZOLE  500 mg Intravenous Q8H    diazePAM  5 mg Intravenous Once    sodium chloride flush  10 mL Intravenous 2 times per day    bumetanide  1 mg Intravenous BID    insulin lispro  0-12 Units Subcutaneous TID WC    insulin lispro  0-6 Units Subcutaneous Nightly    atorvastatin  40 mg Oral Nightly    carvedilol  3.125 mg Oral BID    clopidogrel  75 mg Oral Daily    doxazosin  4 mg Oral BID    pregabalin  150 mg Oral BID    heparin (porcine)  5,000 Units Subcutaneous 3 times per day      dextrose       oxyCODONE, morphine, sodium chloride flush, promethazine **OR** ondansetron, polyethylene glycol, acetaminophen **OR** acetaminophen, glucose, dextrose, glucagon (rDNA), dextrose, albuterol sulfate HFA, morphine    Lab Data:  CBC:   Recent Labs     12/24/20  0943 12/25/20  0311 12/26/20  0245   WBC 5.6 6.2 5.7   HGB 9.6* 8.7* 9.4*   HCT 32.4* 28.9* 30.2*   MCV 95.9 94.1 93.2    165 181     BMP:   Recent Labs     12/24/20  0943 12/25/20  0311 12/26/20  0245    143 143   K 4.6 4.6 4.4    103 102   CO2 31 31 32   BUN 26* 29* 29*   CREATININE 2.0* 1.9* 1.9*     PT/INR: No results for input(s): PROTIME, INR in the last 72 hours. BNP:    No results for input(s): PROBNP in the last 72 hours. TROPONIN:   No results for input(s): TROPONINT in the last 72 hours. Impression:  Active Problems:    Chronic coronary artery disease    Automatic implantable cardioverter-defibrillator in situ    Chronic kidney disease, stage III (moderate)    PVD (peripheral vascular disease) ([facility])    Acute on chronic systolic CHF (congestive heart failure) ([facility]  Resolved Problems:    * No resolved hospital problems. *       All labs, medications and tests reviewed by myself, continue all other medications of all above medical condition listed as is except for changes mentioned above. Thank you   Please call with questions. Electronically signed by Marla Diggs. MICKY Galaviz - CNP on 12/26/2020 at 9:41 AM           CARDIOLOGY ATTENDING ADDENDUM    I have seen, spoken to and examined this patient personally, independently of the nurse practitioner. I have reviewed the hospital care given to date and reviewed all pertinent labs and imaging. The plan was developed mutually at the time of the visit with the patient,  NP   and myself. I have spoken with patient, nursing staff and provided written and verbal instructions . The above note has been reviewed and I agree with the assessment, diagnosis, and treatment plan with changes made by me as follows       HPI:  I have reviewed the above HPI  And agree with above   Please review addendum/changes made to note above     Interval history:            Physical Exam:  General:  Awake, alert, NAD  Head:normal  Eye:normal  Neck:  No JVD   Chest:  Clear to auscultation, respiration easy  Cardiovascular:  s1s2  Abdomen:   nontender  Extremities:  + edema  Pulses; palpable  Neuro: grossly normal      MEDICAL DECISION MAKING;    I agree with the above plan, which was planned by myself and discussed with NP.     Pt to OR today   Cont IV Diuretics      Dr. Toan Topete MD

## 2020-12-26 NOTE — OP NOTE
phalange was isolated and circumferentially dissected using the periosteal elevator. The bone was cut with bone cutter and the end was cleaned with a Rongeur. The wound was hemostatic using a small amount of Bovie electrocautery. The specimen was sent to   Pathology. Using 3-0 and 2-0 Vicryl, the fascial plane was closed covering the bony stump and 4-0 nylon was used to approximate the skin in interrupted fashion. Xerform, gauze, ABD and Kerlix   dressings were applied. The patient was subsequently transferred to the recovery room in stable condition. Instrument and lap counts were correct at the end of the case.          Electronically signed by Carolina Gonzalez MD on 12/26/2020 at 8:30 AM

## 2020-12-26 NOTE — PROGRESS NOTES
[address] [facility]  consulted by Dr. Sonia Steen for monitoring and adjustment. Indication for treatment: Gangrenous left great toe  Goal trough: 15 mcg/mL    Pertinent Laboratory Values:   Temp Readings from Last 3 Encounters:   12/26/20 98.3 °F (36.8 °C) (Oral)   12/17/20 96.7 °F (35.9 °C) (Infrared)   12/13/20 98.2 °F (36.8 °C)     Recent Labs     12/24/20  0943 12/25/20  0311 12/26/20  0245   WBC 5.6 6.2 5.7     Recent Labs     12/24/20  0943 12/25/20  0311 12/26/20  0245   BUN 26* 29* 29*   CREATININE 2.0* 1.9* 1.9*     Estimated Creatinine Clearance: 49 mL/min (A) (based on SCr of 1.9 mg/dL (H)). Intake/Output Summary (Last 24 hours) at 12/26/2020 1804  Last data filed at 12/26/2020 1433  Gross per 24 hour   Intake 1697.37 ml   Output 3200 ml   Net -1502.63 ml     Pertinent Cultures:  Date    Source    Results  12/21   Blood    NGTD  12/22   Wound    Pending     Vancomycin level:   TROUGH:    Recent Labs     12/26/20  1123   VANCOTROUGH 12.9     RANDOM:    Recent Labs     12/24/20  0943 12/25/20  0311   VANCORANDOM 9.8 7.1       Assessment:  · WBC and temperature: WBC normal, pt remains afebrile  · SCr, BUN, and urine output: CKD3, SCR steady @1.9,  UOP good  · Day(s) of therapy: 4  · Vancomycin concentration:   · 12/23: 6.2, 42h post-dose, appropriate to re-dose  · 12/24: 9.8  · 12/25: 7.1 (38 hour level, ok to re-dose)  · 12/26:  12.9 (22h post-dose), OK to re-dose    Plan:  · Intermittent dosing due to CKD3 and SCR trends  · Vanco level therapeutic @ 12.9  · Ordered Vancomycin 1,500mg ivpb x1 dose today  · Repeat next level tomorrow  · Pharmacy will continue to monitor patient and adjust therapy as indicated    [address] 12/27 @ 12:00    Thank you for the consult.   Miguel FordeNewYork-Presbyterian [facility]   12/26/2020 6:04 PM

## 2020-12-27 VITALS
BODY MASS INDEX: 38.18 KG/M2 | TEMPERATURE: 97.9 F | OXYGEN SATURATION: 98 % | HEART RATE: 64 BPM | RESPIRATION RATE: 12 BRPM | SYSTOLIC BLOOD PRESSURE: 147 MMHG | WEIGHT: 281.9 LBS | HEIGHT: 72 IN | DIASTOLIC BLOOD PRESSURE: 69 MMHG

## 2020-12-27 LAB
ANION GAP SERPL CALCULATED.3IONS-SCNC: 6 MMOL/L (ref 4–16)
BUN BLDV-MCNC: 25 MG/DL (ref 6–23)
CALCIUM SERPL-MCNC: 8.1 MG/DL (ref 8.3–10.6)
CHLORIDE BLD-SCNC: 97 MMOL/L (ref 99–110)
CO2: 35 MMOL/L (ref 21–32)
CREAT SERPL-MCNC: 1.9 MG/DL (ref 0.9–1.3)
GFR AFRICAN AMERICAN: 43 ML/MIN/1.73M2
GFR NON-AFRICAN AMERICAN: 35 ML/MIN/1.73M2
GLUCOSE BLD-MCNC: 135 MG/DL (ref 70–99)
GLUCOSE BLD-MCNC: 145 MG/DL (ref 70–99)
GLUCOSE BLD-MCNC: 198 MG/DL (ref 70–99)
HCT VFR BLD CALC: 29.7 % (ref 42–52)
HEMOGLOBIN: 9.1 GM/DL (ref 13.5–18)
HIGH SENSITIVE C-REACTIVE PROTEIN: 41.4 MG/L
MCH RBC QN AUTO: 28.7 PG (ref 27–31)
MCHC RBC AUTO-ENTMCNC: 30.6 % (ref 32–36)
MCV RBC AUTO: 93.7 FL (ref 78–100)
PDW BLD-RTO: 14.1 % (ref 11.7–14.9)
PLATELET # BLD: 184 K/CU MM (ref 140–440)
PMV BLD AUTO: 10.8 FL (ref 7.5–11.1)
POTASSIUM SERPL-SCNC: 4.6 MMOL/L (ref 3.5–5.1)
RBC # BLD: 3.17 M/CU MM (ref 4.6–6.2)
SODIUM BLD-SCNC: 138 MMOL/L (ref 135–145)
WBC # BLD: 6.3 K/CU MM (ref 4–10.5)

## 2020-12-27 PROCEDURE — 2500000003 HC RX 250 WO HCPCS: Performed by: SURGERY

## 2020-12-27 PROCEDURE — 82962 GLUCOSE BLOOD TEST: CPT

## 2020-12-27 PROCEDURE — 80048 BASIC METABOLIC PNL TOTAL CA: CPT

## 2020-12-27 PROCEDURE — 6360000002 HC RX W HCPCS: Performed by: SURGERY

## 2020-12-27 PROCEDURE — 6370000000 HC RX 637 (ALT 250 FOR IP): Performed by: INTERNAL MEDICINE

## 2020-12-27 PROCEDURE — 99024 POSTOP FOLLOW-UP VISIT: CPT | Performed by: SURGERY

## 2020-12-27 PROCEDURE — 99232 SBSQ HOSP IP/OBS MODERATE 35: CPT | Performed by: INTERNAL MEDICINE

## 2020-12-27 PROCEDURE — 80202 ASSAY OF VANCOMYCIN: CPT

## 2020-12-27 PROCEDURE — 86141 C-REACTIVE PROTEIN HS: CPT

## 2020-12-27 PROCEDURE — 6370000000 HC RX 637 (ALT 250 FOR IP): Performed by: SURGERY

## 2020-12-27 PROCEDURE — 6370000000 HC RX 637 (ALT 250 FOR IP): Performed by: HOSPITALIST

## 2020-12-27 PROCEDURE — 36415 COLL VENOUS BLD VENIPUNCTURE: CPT

## 2020-12-27 PROCEDURE — 85027 COMPLETE CBC AUTOMATED: CPT

## 2020-12-27 PROCEDURE — 2580000003 HC RX 258: Performed by: SURGERY

## 2020-12-27 PROCEDURE — APPSS60 APP SPLIT SHARED TIME 46-60 MINUTES: Performed by: NURSE PRACTITIONER

## 2020-12-27 RX ORDER — CLINDAMYCIN HYDROCHLORIDE 150 MG/1
300 CAPSULE ORAL EVERY 6 HOURS SCHEDULED
Status: DISCONTINUED | OUTPATIENT
Start: 2020-12-27 | End: 2020-12-27 | Stop reason: HOSPADM

## 2020-12-27 RX ORDER — CLINDAMYCIN HYDROCHLORIDE 300 MG/1
300 CAPSULE ORAL 3 TIMES DAILY
Qty: 15 CAPSULE | Refills: 0 | Status: SHIPPED | OUTPATIENT
Start: 2020-12-27 | End: 2021-01-01

## 2020-12-27 RX ORDER — TORSEMIDE 20 MG/1
100 TABLET ORAL 2 TIMES DAILY
Status: DISCONTINUED | OUTPATIENT
Start: 2020-12-27 | End: 2020-12-27 | Stop reason: HOSPADM

## 2020-12-27 RX ADMIN — SODIUM CHLORIDE, PRESERVATIVE FREE 10 ML: 5 INJECTION INTRAVENOUS at 10:44

## 2020-12-27 RX ADMIN — HEPARIN SODIUM 5000 UNITS: 5000 INJECTION INTRAVENOUS; SUBCUTANEOUS at 06:10

## 2020-12-27 RX ADMIN — METRONIDAZOLE 500 MG: 500 INJECTION, SOLUTION INTRAVENOUS at 02:15

## 2020-12-27 RX ADMIN — CLOPIDOGREL BISULFATE 75 MG: 75 TABLET ORAL at 10:43

## 2020-12-27 RX ADMIN — METRONIDAZOLE 500 MG: 500 INJECTION, SOLUTION INTRAVENOUS at 10:42

## 2020-12-27 RX ADMIN — CARVEDILOL 3.12 MG: 3.12 TABLET, FILM COATED ORAL at 10:43

## 2020-12-27 RX ADMIN — CLINDAMYCIN HYDROCHLORIDE 300 MG: 150 CAPSULE ORAL at 13:56

## 2020-12-27 RX ADMIN — MORPHINE SULFATE 2 MG: 2 INJECTION, SOLUTION INTRAMUSCULAR; INTRAVENOUS at 02:14

## 2020-12-27 RX ADMIN — TORSEMIDE 100 MG: 20 TABLET ORAL at 10:48

## 2020-12-27 RX ADMIN — DOXAZOSIN 4 MG: 4 TABLET ORAL at 10:42

## 2020-12-27 RX ADMIN — PREGABALIN 150 MG: 75 CAPSULE ORAL at 10:43

## 2020-12-27 RX ADMIN — HEPARIN SODIUM 5000 UNITS: 5000 INJECTION INTRAVENOUS; SUBCUTANEOUS at 13:56

## 2020-12-27 RX ADMIN — CEFEPIME HYDROCHLORIDE 2 G: 2 INJECTION, POWDER, FOR SOLUTION INTRAVENOUS at 00:12

## 2020-12-27 ASSESSMENT — PAIN SCALES - GENERAL
PAINLEVEL_OUTOF10: 9
PAINLEVEL_OUTOF10: 9

## 2020-12-27 ASSESSMENT — PAIN DESCRIPTION - DESCRIPTORS: DESCRIPTORS: BURNING;STABBING

## 2020-12-27 ASSESSMENT — PAIN DESCRIPTION - PROGRESSION: CLINICAL_PROGRESSION: NOT CHANGED

## 2020-12-27 ASSESSMENT — PAIN DESCRIPTION - LOCATION: LOCATION: TOE (COMMENT WHICH ONE)

## 2020-12-27 ASSESSMENT — PAIN DESCRIPTION - ORIENTATION: ORIENTATION: LEFT

## 2020-12-27 ASSESSMENT — PAIN - FUNCTIONAL ASSESSMENT: PAIN_FUNCTIONAL_ASSESSMENT: PREVENTS OR INTERFERES SOME ACTIVE ACTIVITIES AND ADLS

## 2020-12-27 NOTE — PROGRESS NOTES
GENERAL SURGERY PROGRESS NOTE    Lalito Brambila is a 79 y.o. male with left great toe and foot wounds, known to Dr. Carmelo Beltran. Subjective:  Doing ok this morning s/p left great toe amputation. Pain well controlled. Denies complaints. Feels ready for home. Objective:    Vitals: VITALS:  /88   Pulse 61   Temp 99 °F (37.2 °C) (Oral)   Resp 11   Ht 6' (1.829 m)   Wt 281 lb 14.4 oz (127.9 kg)   SpO2 97%   BMI 38.23 kg/m²     I/O: 12/26 0701 - 12/27 0700  In: 2158.9 [I.V.:1558.9]  Out: 2050 [Urine:2050]    Labs/Imaging Results:   Lab Results   Component Value Date     12/27/2020    K 4.6 12/27/2020    K 5.1 01/10/2018    CL 97 12/27/2020    CO2 35 12/27/2020    BUN 25 12/27/2020    CREATININE 1.9 12/27/2020    GLUCOSE 145 12/27/2020    CALCIUM 8.1 12/27/2020      Lab Results   Component Value Date    WBC 6.3 12/27/2020    HGB 9.1 (L) 12/27/2020    HCT 29.7 (L) 12/27/2020    MCV 93.7 12/27/2020     12/27/2020       IV Fluids: dextrose    Scheduled Meds:   torsemide, 100 mg, Oral, BID    vancomycin (VANCOCIN) intermittent dosing (placeholder), , Other, RX Placeholder    cefepime, 2 g, Intravenous, Q12H    metroNIDAZOLE, 500 mg, Intravenous, Q8H    diazePAM, 5 mg, Intravenous, Once    sodium chloride flush, 10 mL, Intravenous, 2 times per day    insulin lispro, 0-12 Units, Subcutaneous, TID WC    insulin lispro, 0-6 Units, Subcutaneous, Nightly    atorvastatin, 40 mg, Oral, Nightly    carvedilol, 3.125 mg, Oral, BID    clopidogrel, 75 mg, Oral, Daily    doxazosin, 4 mg, Oral, BID    pregabalin, 150 mg, Oral, BID    heparin (porcine), 5,000 Units, Subcutaneous, 3 times per day    Physical Exam:  General: A&O x 3, no distress. HEENT: Anicteric sclerae, MMM. Extremities:   Left foot with dressing in place, minimal strike through on gauze  Abdomen: Soft, nontender, nondistended.      Angiogram findings/intervention:  Procedure Narrative      Findings: Occluded Prox L Popliteal      Good PT and peroneal foot      Decision made to intervene      Difficulty passing catheter across lesion. 3mm balloon used to dilate lesion   to allow catheter to pass. PTA an DCB then performed of L SFA and Popliteal with good result    Assessment and Plan:  79 y.o. male with PAD s/p vascular intervention. POD1 from left great toe amputation. Doing well.     Patient Active Problem List:     Chronic coronary artery disease     Automatic implantable cardioverter-defibrillator in situ     Chronic combined systolic and diastolic heart failure (HCC)     Chronic kidney disease, stage III (moderate)     Mixed hyperlipidemia     Sick sinus syndrome ([facility])     Type 2 diabetes mellitus with diabetic polyneuropathy ([address]     Spinal stenosis of lumbar region     Obesity, Class I, BMI 30-34.9     Postoperative hypertension     S/P partial colectomy     Tubulovillous adenoma of colon     Microalbuminuria     PVD (peripheral vascular disease) ([facility])     Limb ischemia     Necrotic toes ([facility])     Toe gangrene ([facility])     Diabetic foot infection ([address]     Chronic kidney disease (CKD) stage G3a/A2, moderately decreased glomerular filtration rate (GFR) between 45-59 mL/min/1.73 square meter and albuminuria creatinine ratio between  mg/g     DM (diabetes mellitus) ([address]     Edema     ICD (implantable cardioverter-defibrillator) battery depletion     Hyperkalemia     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[facility])     Ischemia of toe     Acute kidney injury ([address]     Fluid overload     DM (diabetes mellitus) ([address]     Hypertensive emergency     Precordial pain     Acute chest pain     Unstable angina ([facility])     Chronic kidney disease (CKD) stage G3a/A3, moderately decreased glomerular filtration rate (GFR) between 45-59 mL/min/1.73 square meter and albuminuria creatinine ratio greater than 300 mg/g     Cardiomyopathy ([address]     Hypertensive crisis     Diabetic neuropathy (HCC)     HTN (hypertension)     Epigastric pain     Heart failure exacerbated by sotalol (HCC)     Bilateral lower extremity edema     Elevated troponin     Acute on chronic systolic CHF (congestive heart failure) ([facility]      - doing well from a surgical standpoint, ok for discharge home  - will have him follow up with me in ~10d for wound check  - daily dressing change with dry gauze starting tomorrow    Hugo Art MD

## 2020-12-27 NOTE — PROGRESS NOTES
Doctor Please copy and paste below in your progress note per DME requirements. Doctor Please copy and paste below in your progress note per DME requirements. Patient was seen in hospital for   CHF   . I am prescribing oxygen because the diagnosis and testing requires the patient to have oxygen in the home. Conditions will improve or be benefited by oxygen use. The patient is able to perform good mobility and therefore requires the use of a portable oxygen system for ambulation.

## 2020-12-27 NOTE — DISCHARGE SUMMARY
Discharge Summary    Name:  Reginaldo Godfrey /Age/Sex: 1950  (79 y.o. male)   MRN & CSN:  [id number] & [id number] Admission Date/Time: 2020 11:39 PM   Attending:  Radha Dale MD Discharging Physician: Morris Watts MD     HPI and Hospital Course:   Reginaldo Godfrey is a 79 y.o.  male  who presented with shortness of breath    HPI- as per H and [address]  1-Acute on chronic systolic/diastolic HF- on IV bumex- not on ACEI/ARB due to renal failure- has been effectively diuresed, now switched to torsemide. On coreg but on ACEI/ARB and aldactone due to renal failure with concerns of hyperkalemia. 2-Acute hypoxic respiratory failure- likely due to above- on [place] oxygen- not on home oxygen, will do home oxygen evaluation today. Also reports STACI not on CPAP- recommended outpatient follow up  3-PAD/DM with gangrenous left great toe-  amputation on - wound culture on  negative so far- on cefepime/vanc/flagyl- d/w surgery, given infected tissues have been removed surgically- will discharge on clindamycin for few more days. e  4-CKD stage 3 with volume overload due to #1- nephro following, on diuretics as above     Other issues  -HTN  -DM  -CAD  -PAD  -HLD    [facility] ordered as he had one, did not want to consider rehab. Wound care orders placed on [facility] orders. Patient was seen in hospital for   CHF   . I am prescribing oxygen because the diagnosis and testing requires the patient to have oxygen in the home. Conditions will improve or be benefited by oxygen use. The patient is able to perform good mobility and therefore requires the use of a portable oxygen system for ambulation. The patient expressed appropriate understanding of and agreement with the discharge recommendations, medications, and plan.      Consults this admission:  IP CONSULT TO HOSPITALIST  IP CONSULT TO CARDIOLOGY  IP CONSULT TO GENERAL SURGERY  IP CONSULT TO VASCULAR SURGERY  IP CONSULT TO NEPHROLOGY  IP CONSULT TO PHARMACY  IP CONSULT TO HOME CARE NEEDS    Discharge Instruction:   Follow up appointments:   Primary care physician:  within 2 weeks    Diet:  General/cardiac/ADA/as tolerated  Activity: {discharge activity: as tolerated  Disposition: Discharged to:   [x]Home, []C, []SNF, []Acute Rehab, []Hospice   Condition on discharge: Stable    Discharge Medications:      [address], [address] Medication Instructions EWS:[id number]    Printed on:12/27/20 1201   Medication Information                      albuterol sulfate HFA (VENTOLIN HFA) 108 (90 Base) MCG/ACT inhaler  Inhale 2 puffs into the lungs every 4 hours as needed for Wheezing             atorvastatin (LIPITOR) 40 MG tablet  Take 1 tablet by mouth nightly             Calcium Alginate (ALGICELL CALCIUM DRESSING 4\"X8) MISC  Apply 1 Device topically daily             carvedilol (COREG) 3.125 MG tablet  Take 1 tablet by mouth 2 times daily             clindamycin (CLEOCIN) 300 MG capsule  Take 1 capsule by mouth 3 times daily for 5 days             clopidogrel (PLAVIX) 75 MG tablet  Take 1 tablet by mouth daily             dicyclomine (BENTYL) 10 MG capsule  Take 1 capsule by mouth 4 times daily (before meals and nightly)             doxazosin (CARDURA) 2 MG tablet  Take 2 tablets by mouth 2 times daily             insulin lispro (HUMALOG) 100 UNIT/ML injection vial  Inject 0-6 Units into the skin 3 times daily (with meals)             pregabalin (LYRICA) 150 MG capsule  TAKE 1 CAPSULE BY MOUTH THREE TIMES A DAY             PROMOGRAN COREY WOUND DRESSING MATRIX  Apply topically Apply to left daily and cover with gauze             torsemide (DEMADEX) 100 MG tablet  TAKE 1 TABLET BY MOUTH TWICE A DAY                 Objective Findings at Discharge:   BP (!) 124/39   Pulse 61   Temp 97.7 °F (36.5 °C) (Oral)   Resp 11   Ht 6' (1.829 m)   Wt 281 lb 14.4 oz (127.9 kg)   SpO2 98%   BMI 38.23 kg/m²            PHYSICAL EXAM  GEN Awake male, laying in bed in no apparent distress. EYES Pupils are equally round. No scleral discharge  HENT Atraumatic and symmetric head  NECK No apparent thyromegaly  RESP Symmetric chest movement   CARDIO/VASC Peripheral pulses equal bilaterally and palpable. GI Abdomen is not distended. Rectal exam deferred.  Fuller catheter is not present. HEME/LYMPH No petechiae or ecchymoses. MSK Spontaneous movement of BL upper extremities  SKIN Normal coloration, warm, dry. NEURO Cranial nerves appear grossly intact  PSYCH Awake, alert.     BMP/CBC  Recent Labs     12/25/20  0311 12/26/20  0245 12/27/20  0336    143 138   K 4.6 4.4 4.6    102 97*   CO2 31 32 35*   BUN 29* 29* 25*   CREATININE 1.9* 1.9* 1.9*   WBC 6.2 5.7 6.3   HCT 28.9* 30.2* 29.7*    181 184     SIGNIFICANT IMAGING AND LABS:      Discharge Time of 32  minutes    Electronically signed by Giovanny Mcdonnell MD on 12/27/2020 at 12:01 PM

## 2020-12-27 NOTE — PROGRESS NOTES
Nephrology Progress Note  12/27/2020 8:59 AM  Subjective:      Interval History: Karis Long is a 79 y.o. male with weakness and state pain foot better and want try go home        Data:   Scheduled Meds:   vancomycin (VANCOCIN) intermittent dosing (placeholder)   Other RX Placeholder    cefepime  2 g Intravenous Q12H    metroNIDAZOLE  500 mg Intravenous Q8H    diazePAM  5 mg Intravenous Once    sodium chloride flush  10 mL Intravenous 2 times per day    bumetanide  1 mg Intravenous BID    insulin lispro  0-12 Units Subcutaneous TID WC    insulin lispro  0-6 Units Subcutaneous Nightly    atorvastatin  40 mg Oral Nightly    carvedilol  3.125 mg Oral BID    clopidogrel  75 mg Oral Daily    doxazosin  4 mg Oral BID    pregabalin  150 mg Oral BID    heparin (porcine)  5,000 Units Subcutaneous 3 times per day     Continuous Infusions:   dextrose             CBC:   Recent Labs     12/25/20  0311 12/26/20  0245 12/27/20  0336   WBC 6.2 5.7 6.3   HGB 8.7* 9.4* 9.1*    181 184     BMP:    Recent Labs     12/25/20  0311 12/26/20  0245 12/27/20  0336    143 138   K 4.6 4.4 4.6    102 97*   CO2 31 32 35*   BUN 29* 29* 25*   CREATININE 1.9* 1.9* 1.9*   GLUCOSE 178* 134* 145*       Renal Labs  Albumin:    Lab Results   Component Value Date    LABALBU 3.1 12/24/2020     Calcium:    Lab Results   Component Value Date    CALCIUM 8.1 12/27/2020     Phosphorus:    Lab Results   Component Value Date    PHOS 3.1 12/23/2020     U/A:    Lab Results   Component Value Date    NITRU NEGATIVE 12/21/2020    COLORU STRAW 12/21/2020    PHUR 5.0 08/19/2016    WBCUA <1 12/21/2020    RBCUA NONE SEEN 12/21/2020    MUCUS RARE 10/22/2020    TRICHOMONAS NONE SEEN 12/21/2020    BACTERIA NEGATIVE 12/21/2020    CLARITYU CLEAR 12/21/2020    SPECGRAV 1.008 12/21/2020    UROBILINOGEN NORMAL 12/21/2020    BILIRUBINUR NEGATIVE 12/21/2020    BLOODU NEGATIVE 12/21/2020    KETUA NEGATIVE 12/21/2020           Objective:   I/O: 12/26 0701 - 12/27 0700  In: 2158.9 [I.V.:1558.9]  Out: 2050 [Urine:2050]  Vitals: /88   Pulse 61   Temp 99 °F (37.2 °C) (Oral)   Resp 11   Ht 6' (1.829 m)   Wt 281 lb 14.4 oz (127.9 kg)   SpO2 97%   BMI 38.23 kg/m²   General appearance: awake weak  HEENT: Head: Normal, normocephalic, atraumatic.   Neck: supple, symmetrical, trachea midline  Lungs: diminished breath sounds bilaterally  Heart: S1, S2 normal  Abdomen: abnormal findings:  soft nt  Extremities: edema mild with dressing foot  Neurologic: Mental status: alertness: alert        Assessment and Plan:      IMP:  ckd 3 B risk with contrast  Fluid overload with CHF  PAD sp angio/amputation  Wound infection    Plan     Creat 1.9 baseline and tolerated angio, oral hydration and can fu with renal office 1-2 week  Volume stable and not sig overload wean o2 and monitor  Sp surgery and angio and area healing  Culture negative and low grade temp  From renal can plan for dc when surgery state stable  Monitor hb outpt           George Burns MD

## 2020-12-27 NOTE — PROGRESS NOTES
Cardiology Progress Note     Today's Plan Will sign off     Admit Date:  12/20/2020    Consult reason/ Seen today for: shortness of breath - non healing left foot great toe ulcer    Subjective and  Overnight Events: Patient in good spirts today, wanting to be discharged. He denies any shortness of breath. Assessment / Plan / Recommendation:     1. Acute on chronic combined decompensated heart failure. EF of 45% with LVH NYHA Class: II. Appears euvolemic  Net -7.2 L so far continue with IV diuresis- bumex 1 mg bid-nephrology managing. Please continue Gudelines recommended medical thearpy including coreg and losartan daily. Daily weights, strict Is and O's  Fluid restriction 1800 ml- low sodium diet. Stop Pletal due to CHF. 2. Hypomagnesia: recommend replacement. 3. PAD-underwent left leg PCI by CT surgery continue aspirin Plavix  4. Gangrenous left toe with amputation yesterday  5. CAD- stable -has chronic elevation of troponin- continue BB and plavix  6. Uncontrolled HTN:controlled- continue with losartan   7. Will sign off, please re-consult for any cardiac complaints or concerns. History of Presenting Illness:    Chief complain on admission : 79 y. o.year old who is admitted for  Chief Complaint   Patient presents with    Shortness of Breath     Worsening tonight, worse with movement    Groin Swelling     x2wks    Wound Infection     Left foot amputation per Dr. Ortega Captain.          Past medical history:    has a past medical history of Acid reflux, Acute MI ([address] Arthritis, Broken teeth, CAD (coronary artery disease), Cardiomyopathy ([address] CHF (congestive heart failure) ([address] Chronic back pain, Chronic kidney disease, Diabetes mellitus ([address] Diabetic neuropathy ([address] H/O cardiovascular stress test, H/O Doppler ultrasound, H/O percutaneous left heart catheterization, History of irregular heartbeat, History of syncope, Net -1168.44 ml       Physical Exam:  Constitutional:  Well developed, No acute distress  HENT:  Normocephalic, Atraumatic, Bilateral external ears normal,  Nose normal.   Neck- trachea is midline  Eyes:  PEERL, Conjunctiva normal  Respiratory: decreased breath sounds, No respiratory distress, No wheezing, No chest tenderness. Cardiovascular:  Normal heart rate, Normal rhythm, no murmurs appreciated, No rubs appreciated, No gallops appreciated, JVP not elevated  Abdomen/GI:  Soft, No tenderness  Musculoskeletal:  Intact distal pulses, trace edema to lower legs, + tenderness, No cyanosis, No clubbing. Dressing to left foot   - scrotal swelling   Integument:  Warm, Dry- dressing to left foot   Lymphatic:  No lymphadenopathy noted.    Neurologic:  Alert & oriented  Psychiatric:  Affect and Mood :pleasant     Medications:    torsemide  100 mg Oral BID    vancomycin (VANCOCIN) intermittent dosing (placeholder)   Other RX Placeholder    cefepime  2 g Intravenous Q12H    metroNIDAZOLE  500 mg Intravenous Q8H    diazePAM  5 mg Intravenous Once    sodium chloride flush  10 mL Intravenous 2 times per day    insulin lispro  0-12 Units Subcutaneous TID WC    insulin lispro  0-6 Units Subcutaneous Nightly    atorvastatin  40 mg Oral Nightly    carvedilol  3.125 mg Oral BID    clopidogrel  75 mg Oral Daily    doxazosin  4 mg Oral BID    pregabalin  150 mg Oral BID    heparin (porcine)  5,000 Units Subcutaneous 3 times per day      dextrose       oxyCODONE, morphine, sodium chloride flush, promethazine **OR** ondansetron, polyethylene glycol, acetaminophen **OR** acetaminophen, glucose, dextrose, glucagon (rDNA), dextrose, albuterol sulfate HFA, morphine    Lab Data:  CBC:   Recent Labs     12/25/20 0311 12/26/20 0245 12/27/20  0336   WBC 6.2 5.7 6.3   HGB 8.7* 9.4* 9.1*   HCT 28.9* 30.2* 29.7*   MCV 94.1 93.2 93.7    181 184     BMP:   Recent Labs     12/25/20 0311 12/26/20 0245 12/27/20  0336   NA normal      MEDICAL DECISION MAKING;    I agree with the above plan, which was planned by myself and discussed with NP.     Cont IV diuretics per Nephrology   Optimize BP control   Will sign off       Dr. Silvio Lauren MD

## 2020-12-28 LAB
CULTURE: NORMAL
Lab: NORMAL
SPECIMEN: NORMAL

## 2020-12-31 LAB
EKG ATRIAL RATE: 77 BPM
EKG DIAGNOSIS: NORMAL
EKG P AXIS: 80 DEGREES
EKG P-R INTERVAL: 158 MS
EKG Q-T INTERVAL: 424 MS
EKG QRS DURATION: 96 MS
EKG QTC CALCULATION (BAZETT): 479 MS
EKG R AXIS: 43 DEGREES
EKG T AXIS: 26 DEGREES
EKG VENTRICULAR RATE: 77 BPM

## 2021-01-01 NOTE — PATIENT INSTRUCTIONS
Patient to be cleared from Dr Godfrey Ro from the right foot infection point of view so that he can have elective ICD generator replaced on Friday 10/27/2017. Cannot hold Plavix as had recent PAD intervention. Continue current cardiovascular medications which have been reviewed and discussed individually with you. Patient has the procedure described. Potential risks, complications, alternatives are discussed in detail. Questions are encouraged and addressed to patient's satisfaction. Patient verbalized understanding and asked relevant questions and agreed to proceed with the procedure. Informed consent obtained.   Questions answered and patient verbalizes
show

## 2021-01-06 ENCOUNTER — OFFICE VISIT (OUTPATIENT)
Dept: SURGERY | Age: 71
End: 2021-01-06

## 2021-01-06 VITALS
WEIGHT: 259 LBS | DIASTOLIC BLOOD PRESSURE: 78 MMHG | TEMPERATURE: 98.3 F | HEIGHT: 73 IN | SYSTOLIC BLOOD PRESSURE: 146 MMHG | OXYGEN SATURATION: 96 % | HEART RATE: 83 BPM | BODY MASS INDEX: 34.33 KG/M2

## 2021-01-06 DIAGNOSIS — S98.132A AMPUTATED TOE, LEFT (HCC): Primary | ICD-10-CM

## 2021-01-06 DIAGNOSIS — Z48.89 POSTOPERATIVE VISIT: ICD-10-CM

## 2021-01-06 PROCEDURE — 99024 POSTOP FOLLOW-UP VISIT: CPT | Performed by: SURGERY

## 2021-01-06 RX ORDER — ACETAMINOPHEN 500 MG
1000 TABLET ORAL 3 TIMES DAILY PRN
Qty: 120 TABLET | Refills: 0 | Status: ON HOLD | OUTPATIENT
Start: 2021-01-06 | End: 2021-06-09

## 2021-01-11 ENCOUNTER — TELEPHONE (OUTPATIENT)
Dept: SURGERY | Age: 71
End: 2021-01-11

## 2021-01-11 NOTE — TELEPHONE ENCOUNTER
Jaz (Anmol's Spouse) called concerned that his scrotum is swollen and painful. Recommended ice to the area and follow-up with PCP or ER or to follow-up with Urology.

## 2021-02-07 ASSESSMENT — ENCOUNTER SYMPTOMS
SORE THROAT: 0
CHOKING: 0
EYE ITCHING: 0
CONSTIPATION: 0
BACK PAIN: 0
APNEA: 0
PHOTOPHOBIA: 0
EYE REDNESS: 0
COLOR CHANGE: 0
RECTAL PAIN: 0
ANAL BLEEDING: 0
STRIDOR: 0

## 2021-02-07 NOTE — PROGRESS NOTES
Chief Complaint   Patient presents with    Follow-up     Left Great Toe fell on 12/12/20         SUBJECTIVE:  HPI: Patient is herewith complaints of left great toe infection. In fact patient claims that the tip of the toe did auto amputate. Patient has had a longstanding peripheral vascular disease with ischemic toes. He has had multiple toe amputations in the past.  He is wheelchair-bound with severe chronic venous stasis. He has tremendous amount of swelling as a result of his chronic kidney disease. He denies any fever chills. There is some malodorous tissue noted. I have reviewed the patient's(pertinent information to this visit) medical history, family history(scanned in  the [address] [place] [address] under \"patient questioner\"), social history and review of systems with the patient today in the office.             Past Surgical History:   Procedure Laterality Date    CARDIAC CATHETERIZATION      per old chart done 10/2014    CARDIAC CATHETERIZATION  07/14/2017    with angiography of leg    CARDIAC CATHETERIZATION  11/20/2018    PATENT STENTS OF ALL THREE MAJOR VESSELS    CARDIAC DEFIBRILLATOR PLACEMENT  06/04/2010    [employer] [address] Defibrillator Implanted    COLECTOMY Right 08/26/2016    laparascopic; robotic assisted    COLONOSCOPY  08/04/2016    CORONARY ANGIOPLASTY      \"15 Heart Stents\"    CORONARY ANGIOPLASTY WITH STENT PLACEMENT      per old chart had angio with stent to circumflex and obtuse marginal artery at [facility] 5/2010( old chart also gives hx of stent placement done 2000,2004 and 2005)    DENTAL SURGERY      Teeth Extracted In Past    PACEMAKER PLACEMENT  06/04/2010    [employer] [address] DR Defibrillator Implanted    TOE AMPUTATION Right 09/12/2017    Rt 3rd toe    TOE AMPUTATION Right 01/09/2018     Right 5th toe amputation and Toenails trimmed left 2,3,4 and 5th toes    TOE AMPUTATION Left 12/26/2020    LEFT GREAT TOE AMPUTATION performed by Trinity Stubbs MD at [facility].  SURGERY      per old chart had balloon angioplasty right superfical femoral artery,right popliteal artery,,right ant.tibial artery, right tibioperoneal trunk, and right post.tibial artery wna stent placement right popliteal artery and superfical femoral artery 7/2012     Past Medical History:   Diagnosis Date    Acid reflux     Acute MI ([address] 2004, 2008    Arthritis     Back    Broken teeth     Upper Front    CAD (coronary artery disease)     Sees Dr. Isidro Ambrose [facility])     per old chart    CHF (congestive heart failure) (HCC)     Chronic back pain     Chronic kidney disease     STAGE 3 KIDNEY FAILURE- \"from my diabetes- do not follow with any one- have seen Dr Billy Bui in the past\"    Diabetes mellitus ([facility] Dx 1965    per old chart pt has been diabetic since age 13    Diabetic neuropathy ([facility]     \"in my feet\"    H/O cardiovascular stress test 08/25/2016    H/O Doppler ultrasound 09/27/2018    Moderate disease of the right lower extremity with an JALEN of 0.72.   Moderate to severe disease of the left lower extremity with an JALEN of 0.55.   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    Neuropathy     both feet    neuropathy     PAD (peripheral artery disease) ([address] 09/27/2018    PVD (peripheral vascular disease) ([address]     Sick sinus syndrome ([address]     Sleep apnea     \"sleep study 3 yrs ago- could not tolerate the cpap made me too dry\"    Spinal stenosis     Teeth missing     Upper And Lower    Type 2 diabetes mellitus without complication ([address]      Family History   Problem Relation Age of Onset    Diabetes Mother     Stroke Mother     High Blood Pressure Mother     Vision Loss Mother     Cancer Father         Prostate Cancer    Diabetes Sister     Neuropathy Sister     Other Sister         \"Breathing Problems\"    Heart Disease Sister     Early Death Sister 62        Heart Complications    Cancer Brother         \"Stomach Cancer\"    High Blood Pressure Brother     Diabetes Brother     Heart Disease Brother     High Blood Pressure Brother     Cancer Son         \"Testicle Cancer\"     Social History     Socioeconomic History    Marital status:       Spouse name: Not on file    Number of children: Not on file    Years of education: Not on file    Highest education level: Not on file   Occupational History    Not on file   Social Needs    Financial resource strain: Not on file    Food insecurity     Worry: Not on file     Inability: Not on file    Transportation needs     Medical: Not on file     Non-medical: Not on file   Tobacco Use    Smoking status: Former Smoker     Packs/day: 0.00     Years: 36.00     Pack years: 0.00     Types: Cigars     Start date: 1/1/1980    Smokeless tobacco: Never Used   Substance and Sexual Activity    Alcohol use: No     Frequency: Never    Drug use: Yes     Types: Marijuana    Sexual activity: Never   Lifestyle    Physical activity     Days per week: Not on file     Minutes per session: Not on file    Stress: Not on file   Relationships    Social connections     Talks on phone: Not on file     Gets together: Not on file     Attends Confucianism service: Not on file     Active member of club or organization: Not on file     Attends meetings of clubs or organizations: Not on file     Relationship status: Not on file    Intimate partner violence     Fear of current or ex partner: Not on file     Emotionally abused: Not on file     Physically abused: Not on file     Forced sexual activity: Not on file   Other Topics Concern    Not on file   Social Genitourinary: Negative for enuresis, flank pain and hematuria. Musculoskeletal: Positive for gait problem. Negative for back pain, joint swelling and myalgias. Skin: Positive for wound. Negative for color change and pallor. Allergic/Immunologic: Negative for environmental allergies. Neurological: Negative for syncope and speech difficulty. Psychiatric/Behavioral: Negative for confusion and hallucinations. OBJECTIVE:  Physical Exam:    BP (!) 142/90 (Site: Right Lower Arm, Position: Sitting, Cuff Size: Medium Adult)   Pulse 71   Temp 96.7 °F (35.9 °C) (Infrared)   Ht 6' 1\" (1.854 m)   Wt 293 lb 4.8 oz (133 kg)   SpO2 93%   BMI 38.70 kg/m²      Physical Exam  Constitutional:       Appearance: He is well-developed. HENT:      Head: Normocephalic. Eyes:      Pupils: Pupils are equal, round, and reactive to light. Neck:      Musculoskeletal: Normal range of motion and neck supple. Cardiovascular:      Rate and Rhythm: Normal rate. Pulmonary:      Effort: Pulmonary effort is normal.   Abdominal:      General: There is no distension. Palpations: Abdomen is soft. There is no mass. Tenderness: There is no abdominal tenderness. There is no guarding or rebound. Musculoskeletal: Normal range of motion. Feet:    Skin:     General: Skin is warm. Neurological:      Mental Status: He is alert and oriented to person, place, and time. ASSESSMENT:  1. Chronic coronary artery disease    2. Toe gangrene ([address]          PLAN:  Treatment: We will continue to perform local wound care and plan for an outpatient to amputation. Patient is advised to go to the ER if his condition worsens. .  Patient counseled on risks, benefits, and alternatives of treatment plan at length today. Patient states an understanding and willingness to proceed with plan.        Orders Placed This Encounter   Procedures    [facility] ([facility]) - Derek Osborn MD, Cardiothoracic Surgery, [facility] No orders of the defined types were placed in this encounter. Follow Up:  No follow-ups on file.       Shanti Miramontes MD

## 2021-03-02 ENCOUNTER — PROCEDURE VISIT (OUTPATIENT)
Dept: CARDIOLOGY CLINIC | Age: 71
End: 2021-03-02
Payer: MEDICARE

## 2021-03-02 DIAGNOSIS — Z95.810 ICD (IMPLANTABLE CARDIOVERTER-DEFIBRILLATOR), DUAL, IN SITU: Primary | ICD-10-CM

## 2021-03-02 DIAGNOSIS — I49.5 SINUS NODE DYSFUNCTION (HCC): ICD-10-CM

## 2021-03-02 PROCEDURE — 93295 DEV INTERROG REMOTE 1/2/MLT: CPT | Performed by: INTERNAL MEDICINE

## 2021-03-02 PROCEDURE — 93296 REM INTERROG EVL PM/IDS: CPT | Performed by: INTERNAL MEDICINE

## 2021-03-02 PROCEDURE — 93297 REM INTERROG DEV EVAL ICPMS: CPT | Performed by: INTERNAL MEDICINE

## 2021-03-11 ENCOUNTER — HOSPITAL ENCOUNTER (EMERGENCY)
Age: 71
Discharge: HOME | End: 2021-03-11
Attending: EMERGENCY MEDICINE
Payer: MEDICARE

## 2021-03-11 ENCOUNTER — APPOINTMENT (OUTPATIENT)
Dept: CT IMAGING | Age: 71
End: 2021-03-11
Payer: MEDICARE

## 2021-03-11 VITALS
RESPIRATION RATE: 25 BRPM | DIASTOLIC BLOOD PRESSURE: 87 MMHG | HEART RATE: 70 BPM | OXYGEN SATURATION: 97 % | SYSTOLIC BLOOD PRESSURE: 210 MMHG | TEMPERATURE: 97.8 F

## 2021-03-11 DIAGNOSIS — R19.7 DIARRHEA, UNSPECIFIED TYPE: ICD-10-CM

## 2021-03-11 DIAGNOSIS — R11.2 NON-INTRACTABLE VOMITING WITH NAUSEA, UNSPECIFIED VOMITING TYPE: ICD-10-CM

## 2021-03-11 DIAGNOSIS — R10.13 ABDOMINAL PAIN, EPIGASTRIC: Primary | ICD-10-CM

## 2021-03-11 LAB
ALBUMIN SERPL-MCNC: 3.7 GM/DL (ref 3.4–5)
ALP BLD-CCNC: 79 IU/L (ref 40–128)
ALT SERPL-CCNC: 7 U/L (ref 10–40)
ANION GAP SERPL CALCULATED.3IONS-SCNC: 10 MMOL/L (ref 4–16)
AST SERPL-CCNC: 12 IU/L (ref 15–37)
BASOPHILS ABSOLUTE: 0 K/CU MM
BASOPHILS RELATIVE PERCENT: 0.3 % (ref 0–1)
BILIRUB SERPL-MCNC: 1.3 MG/DL (ref 0–1)
BUN BLDV-MCNC: 11 MG/DL (ref 6–23)
CALCIUM SERPL-MCNC: 9 MG/DL (ref 8.3–10.6)
CHLORIDE BLD-SCNC: 95 MMOL/L (ref 99–110)
CO2: 28 MMOL/L (ref 21–32)
CREAT SERPL-MCNC: 1.4 MG/DL (ref 0.9–1.3)
DIFFERENTIAL TYPE: ABNORMAL
EOSINOPHILS ABSOLUTE: 0 K/CU MM
EOSINOPHILS RELATIVE PERCENT: 0.4 % (ref 0–3)
GFR AFRICAN AMERICAN: >60 ML/MIN/1.73M2
GFR NON-AFRICAN AMERICAN: 50 ML/MIN/1.73M2
GLUCOSE BLD-MCNC: 269 MG/DL (ref 70–99)
GLUCOSE BLD-MCNC: 278 MG/DL
GLUCOSE BLD-MCNC: 278 MG/DL (ref 70–99)
HCT VFR BLD CALC: 42.5 % (ref 42–52)
HEMOGLOBIN: 14 GM/DL (ref 13.5–18)
IMMATURE NEUTROPHIL %: 0.1 % (ref 0–0.43)
LIPASE: 15 IU/L (ref 13–60)
LYMPHOCYTES ABSOLUTE: 1.1 K/CU MM
LYMPHOCYTES RELATIVE PERCENT: 14.7 % (ref 24–44)
MCH RBC QN AUTO: 28.7 PG (ref 27–31)
MCHC RBC AUTO-ENTMCNC: 32.9 % (ref 32–36)
MCV RBC AUTO: 87.1 FL (ref 78–100)
MONOCYTES ABSOLUTE: 0.4 K/CU MM
MONOCYTES RELATIVE PERCENT: 5 % (ref 0–4)
NUCLEATED RBC %: 0 %
PDW BLD-RTO: 14.9 % (ref 11.7–14.9)
PLATELET # BLD: 204 K/CU MM (ref 140–440)
PMV BLD AUTO: 9.6 FL (ref 7.5–11.1)
POTASSIUM SERPL-SCNC: 4.2 MMOL/L (ref 3.5–5.1)
RBC # BLD: 4.88 M/CU MM (ref 4.6–6.2)
SEGMENTED NEUTROPHILS ABSOLUTE COUNT: 5.8 K/CU MM
SEGMENTED NEUTROPHILS RELATIVE PERCENT: 79.5 % (ref 36–66)
SODIUM BLD-SCNC: 133 MMOL/L (ref 135–145)
TOTAL IMMATURE NEUTOROPHIL: 0.01 K/CU MM
TOTAL NUCLEATED RBC: 0 K/CU MM
TOTAL PROTEIN: 6.9 GM/DL (ref 6.4–8.2)
TROPONIN T: <0.01 NG/ML
WBC # BLD: 7.3 K/CU MM (ref 4–10.5)

## 2021-03-11 PROCEDURE — 96375 TX/PRO/DX INJ NEW DRUG ADDON: CPT

## 2021-03-11 PROCEDURE — 85025 COMPLETE CBC W/AUTO DIFF WBC: CPT

## 2021-03-11 PROCEDURE — 99285 EMERGENCY DEPT VISIT HI MDM: CPT

## 2021-03-11 PROCEDURE — 6360000002 HC RX W HCPCS: Performed by: EMERGENCY MEDICINE

## 2021-03-11 PROCEDURE — 74176 CT ABD & PELVIS W/O CONTRAST: CPT

## 2021-03-11 PROCEDURE — 80053 COMPREHEN METABOLIC PANEL: CPT

## 2021-03-11 PROCEDURE — 84484 ASSAY OF TROPONIN QUANT: CPT

## 2021-03-11 PROCEDURE — 82962 GLUCOSE BLOOD TEST: CPT

## 2021-03-11 PROCEDURE — 96374 THER/PROPH/DIAG INJ IV PUSH: CPT

## 2021-03-11 PROCEDURE — 6370000000 HC RX 637 (ALT 250 FOR IP): Performed by: EMERGENCY MEDICINE

## 2021-03-11 PROCEDURE — 93005 ELECTROCARDIOGRAM TRACING: CPT | Performed by: EMERGENCY MEDICINE

## 2021-03-11 PROCEDURE — 83690 ASSAY OF LIPASE: CPT

## 2021-03-11 PROCEDURE — 2580000003 HC RX 258: Performed by: EMERGENCY MEDICINE

## 2021-03-11 RX ORDER — CARVEDILOL 3.12 MG/1
3.12 TABLET ORAL ONCE
Status: COMPLETED | OUTPATIENT
Start: 2021-03-11 | End: 2021-03-11

## 2021-03-11 RX ORDER — 0.9 % SODIUM CHLORIDE 0.9 %
1000 INTRAVENOUS SOLUTION INTRAVENOUS ONCE
Status: COMPLETED | OUTPATIENT
Start: 2021-03-11 | End: 2021-03-11

## 2021-03-11 RX ORDER — ONDANSETRON 4 MG/1
4 TABLET, FILM COATED ORAL EVERY 8 HOURS PRN
Qty: 8 TABLET | Refills: 0 | Status: ON HOLD | OUTPATIENT
Start: 2021-03-11 | End: 2021-06-09

## 2021-03-11 RX ORDER — ONDANSETRON 2 MG/ML
4 INJECTION INTRAMUSCULAR; INTRAVENOUS ONCE
Status: COMPLETED | OUTPATIENT
Start: 2021-03-11 | End: 2021-03-11

## 2021-03-11 RX ORDER — MORPHINE SULFATE 4 MG/ML
4 INJECTION, SOLUTION INTRAMUSCULAR; INTRAVENOUS ONCE
Status: COMPLETED | OUTPATIENT
Start: 2021-03-11 | End: 2021-03-11

## 2021-03-11 RX ORDER — DOXAZOSIN MESYLATE 4 MG/1
4 TABLET ORAL ONCE
Status: COMPLETED | OUTPATIENT
Start: 2021-03-11 | End: 2021-03-11

## 2021-03-11 RX ADMIN — DOXAZOSIN 4 MG: 4 TABLET ORAL at 11:44

## 2021-03-11 RX ADMIN — MORPHINE SULFATE 4 MG: 4 INJECTION, SOLUTION INTRAMUSCULAR; INTRAVENOUS at 08:50

## 2021-03-11 RX ADMIN — SODIUM CHLORIDE 1000 ML: 9 INJECTION, SOLUTION INTRAVENOUS at 08:51

## 2021-03-11 RX ADMIN — ONDANSETRON 4 MG: 2 INJECTION INTRAMUSCULAR; INTRAVENOUS at 08:50

## 2021-03-11 RX ADMIN — CARVEDILOL 3.12 MG: 3.12 TABLET, FILM COATED ORAL at 11:44

## 2021-03-11 ASSESSMENT — PAIN DESCRIPTION - PAIN TYPE: TYPE: ACUTE PAIN

## 2021-03-11 ASSESSMENT — PAIN SCALES - GENERAL: PAINLEVEL_OUTOF10: 8

## 2021-03-11 ASSESSMENT — PAIN DESCRIPTION - LOCATION: LOCATION: ABDOMEN

## 2021-03-11 NOTE — ED NOTES
Discharge instructions given to pt. Pt is alert and orientated x4.         Mai Crenshaw RN  03/11/21 [id number]

## 2021-03-11 NOTE — ED NOTES
Pt arrives to Ed via [facility] for n/v and diarrhea for 3 days. Pt states he has not been able to eat or drink. Pt has hx of high blood pressure. Pt is a diabetic.       Violetta Jackson RN  03/11/21 [id number]

## 2021-03-11 NOTE — ED PROVIDER NOTES
severe disease of the left lower extremity with an JALEN of 0.55.   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 [address] 09/27/2018    PVD (peripheral vascular disease) ([facility] [address]     Sick sinus syndrome (HCC)     Sleep apnea     \"sleep study 3 yrs ago- could not tolerate the cpap made me too dry\"    Spinal stenosis     Teeth missing     Upper And Lower    Type 2 diabetes mellitus without complication ([facility])        CURRENT MEDICATIONS  [unfilled]    ALLERGIES  Allergies   Allergen Reactions    Pcn [Penicillins] Hives    Fentanyl Itching       SURGICAL HISTORY  Past Surgical History:   Procedure Laterality Date    CARDIAC CATHETERIZATION      per old chart done 10/2014    CARDIAC CATHETERIZATION  07/14/2017    with angiography of leg    CARDIAC CATHETERIZATION  11/20/2018    PATENT STENTS OF ALL THREE MAJOR VESSELS    CARDIAC DEFIBRILLATOR PLACEMENT  06/04/2010    [employer] [address] Defibrillator Implanted    COLECTOMY Right 08/26/2016    laparascopic; robotic assisted    COLONOSCOPY  08/04/2016    CORONARY ANGIOPLASTY      \"15 Heart Stents\"    CORONARY ANGIOPLASTY WITH STENT PLACEMENT      per old chart had angio with stent to circumflex and obtuse marginal artery at [facility] 5/2010( old chart also gives hx of stent placement done 2000,2004 and 2005)    DENTAL SURGERY      Teeth Extracted In Past    PACEMAKER PLACEMENT  06/04/2010    [employer] [address] DR Defibrillator Implanted    TOE AMPUTATION Right 09/12/2017    Rt 3rd toe    TOE AMPUTATION Right 01/09/2018     Right 5th toe amputation and Toenails trimmed left 2,3,4 and 5th toes    TOE AMPUTATION Left 12/26/2020    LEFT GREAT TOE AMPUTATION performed by Marce Hammans, MD at [facility]      per old chart had balloon angioplasty right superfical femoral artery,right popliteal artery,,right ant.tibial artery, right tibioperoneal trunk, and right post.tibial artery wna stent placement right popliteal artery and superfical femoral artery 7/2012       FAMILY HISTORY  Family History   Problem Relation Age of Onset    Diabetes Mother     Stroke Mother     High Blood Pressure Mother     Vision Loss Mother     Cancer Father         Prostate Cancer    Diabetes Sister     Neuropathy Sister     Other Sister         \"Breathing Problems\"    Heart Disease Sister     Early Death Sister 62        Heart Complications    Cancer Brother         \"Stomach Cancer\"    High Blood Pressure Brother     Diabetes Brother     Heart Disease Brother     High Blood Pressure Brother     Cancer Son         \"Testicle Cancer\"       SOCIAL HISTORY  Social History     Socioeconomic History    Marital status:       Spouse name: None    Number of children: None    Years of education: None    Highest education level: None   Occupational History    None   Social Needs    Financial resource strain: None    Food insecurity     Worry: None     Inability: None    Transportation needs     Medical: None     Non-medical: None   Tobacco Use    Smoking status: Former Smoker     Packs/day: 0.00     Years: 36.00     Pack years: 0.00     Types: Cigars     Start date: 1/1/1980    Smokeless tobacco: Never Used   Substance and Sexual Activity    Alcohol use: No     Frequency: Never    Drug use: Yes     Types: Marijuana    Sexual activity: Never   Lifestyle    Physical activity     Days per week: None     Minutes per session: None    Stress: None   Relationships    Social connections     Talks on phone: None     Gets together: None     Attends Caoda[employer] service: None     Active member of club or organization: None     Attends meetings of clubs or organizations: None     Relationship status: None    Intimate partner violence     Fear of current or ex partner: None     Emotionally abused: None     Physically abused: None     Forced sexual activity: None   Other Topics Concern    None   Social History Narrative    None         **Past medical, family and social histories, and nursing notes reviewed and verified by me**      PHYSICAL EXAM  VITAL SIGNS:   ED Triage Vitals [03/11/21 0823]   Enc Vitals Group      BP (!) 177/86      Pulse 73      Resp 22      Temp 97.8 °F (36.6 °C)      Temp Source Oral      SpO2 100 %      Weight       Height       Head Circumference       Peak Flow       Pain Score       Pain Loc       Pain Edu? Excl. in [address]? Vitals during ED course were reviewed and are as charted. Constitutional: Minimal distress, Non-toxic appearance  Eyes: Conjunctiva normal, No discharge  HENT: Normocephalic, Atraumatic, bilateral external ears normal, posterior oropharynx is nonerythematous and without exudate, uvula is midline, no trismus, no \"hot potato voice\" or dysphonia, oropharynx moist  Neck: Supple, no stridor, no grossly visible or palpable masses  Cardiovascular: Regular rate and rhythm, No murmurs, No rubs, No gallops  Pulmonary/Chest: Normal breath sounds, No respiratory distress or accessory muscle use, No wheezing, crackles or rhonchi. Abdomen: Epigastric abdominal tenderness to palpation without peritoneal signs, otherwise abdomen is soft, nondistended and nonrigid, No tenderness or peritoneal signs elsewhere, No masses, normal bowel sounds  Back: No midline point tenderness, No paraspinous muscle tenderness.  No CVA tenderness  Extremities: No gross deformities, no edema, no tenderness  Neurologic: Normal motor function, Normal sensory function, No focal deficits  Skin: Warm, Dry, No erythema, No rash, No cyanosis, No mottling  Lymphatic: No lymphadenopathy in the following location(s): cervical  Psychiatric: Alert and oriented x3, Affect normal          EKG Interpretation    Interpreted by emergency department physician    Rhythm: normal sinus   Rate: normal  Axis: normal  Ectopy: premature ventricular contractions (frequent)  Conduction: normal  ST Segments: normal  T Waves: non specific changes  Q Waves: none    Clinical Impression: Normal sinus rhythm with nonspecific T wave abnormalities in lateral leads      RADIOLOGY/PROCEDURES/LABS/MEDICATIONS ADMINISTERED:    I have reviewed and interpreted all of the currently available lab results from this visit (if applicable):  Results for orders placed or performed during the hospital encounter of 03/11/21   POC Blood Glucose   Result Value Ref Range    Glucose 278 mg/dL   POCT Glucose   Result Value Ref Range    POC Glucose 278 (H) 70 - 99 MG/DL   EKG 12 Lead   Result Value Ref Range    Ventricular Rate 69 BPM    Atrial Rate 69 BPM    P-R Interval 168 ms    QRS Duration 94 ms    Q-T Interval 450 ms    QTc Calculation (Bazett) 482 ms    P Axis 38 degrees    R Axis 36 degrees    T Axis 159 degrees    Diagnosis        Suspect unspecified pacemaker failure  Sinus rhythm with sinus arrhythmia with occasional premature ventricular complexes  ST & T wave abnormality, consider lateral ischemia  Prolonged QT  Abnormal ECG  When compared with ECG of 21-DEC-2020 00:23,  premature ventricular complexes are now present  T wave inversion now evident in Lateral leads            ABNORMAL LABS:  Labs Reviewed   CBC WITH AUTO DIFFERENTIAL - Abnormal; Notable for the following components:       Result Value    Segs Relative 79.5 (*)     Lymphocytes % 14.7 (*)     Monocytes % 5.0 (*)     All other components within normal limits   COMPREHENSIVE METABOLIC PANEL W/ REFLEX TO MG FOR LOW K - Abnormal; Notable for the following components:    Sodium 133 (*)     Chloride 95 (*)     CREATININE 1.4 (*)     Glucose 269 (*)     Total Bilirubin 1.3 (*)     ALT 7 (*)     AST 12 (*)     GFR Non- 50 (*)     All other components within normal limits   POCT GLUCOSE - Abnormal; Notable for the following components:    POC Glucose 278 (*)     All other components within normal limits   POCT GLUCOSE - Normal   LIPASE   TROPONIN         IMAGING STUDIES ORDERED:  CT ABDOMEN PELVIS WO CONTRAST    I have personally viewed the imaging studies. The radiologist interpretation is:   CT ABDOMEN PELVIS WO CONTRAST Additional Contrast? None   Final Result   1. No acute intra-abdominal abnormality. 2. No acute intrapelvic abnormality. 3. Cholelithiasis. 4. No urinary tract calcifications or obstructive uropathy. MEDICATIONS ADMINISTERED:  Medications   carvedilol (COREG) tablet 3.125 mg (has no administration in time range)   doxazosin (CARDURA) tablet 4 mg (has no administration in time range)   ondansetron (ZOFRAN) injection 4 mg (4 mg Intravenous Given 3/11/21 0850)   morphine sulfate (PF) injection 4 mg (4 mg Intravenous Given 3/11/21 0850)   0.9 % sodium chloride bolus (1,000 mLs Intravenous New Bag 3/11/21 0851)         COURSE & MEDICAL DECISION MAKING  Last vitals: BP (!) 177/86   Pulse 73   Temp 97.8 °F (36.6 °C) (Oral)   Resp 22   SpO2 8%     79year-old male with epigastric abdominal cramping with nausea, vomiting and diarrhea likely secondary gastroenteritis. No clinical evidence for any significant dehydration or electrolyte or metabolic abnormalities. Mild hyperglycemia but no evidence for DKA.     Differential diagnoses considered included, but were not limited to, acute appendicitis, acute cholecystitis/cholangitis, acute hepatitis, Acute pancreatitis, acute coronary syndrome, acute intra-abdominal catastrophe, acute vascular emergency, bowel obstruction, perforated bowel, incarcerated or strangulated hernia, colitis, diverticulitis, ischemic bowel, cystitis, pyelonephritis, kidney stone. Patient was given the above medications with improvement in symptoms. On repeat examination the abdomen was non-surgical without peritoneal signs. The patient was able to tolerate oral intake. Patient was advised of the changing nature of intra-abdominal pathology and specific signs and symptoms to watch which may signal serious infectious, metabolic or surgical conditions for which immediate return to the ED would be necessary for further diagnosis and treatment. Additional workup and treatment in the ED as documented above. Patient reassured and will be discharged home. I have explained to the patient in appropriate terminology our work-up in the ED and their diagnosis. I have also given anticipatory guidance and expectant management of their condition as an outpatient as per my custom. The patient was given clear discharge and follow-up instructions including return to the ER immediately for worsening concerns. The patient has been advised to follow-up with their primary care physician and/or referred physician in the next two to three days or sooner if worsening and to return to the ER immediately as above with any concerns. I provided the patient counseling with regard to my customary list of strict return precautions as well as return precautions specific to the cause for today's emergency department visit. The patient will return under these provided conditions, but should also return for new concerns or further worsening. Pt and/or family understand and agree with plan. Clinical Impression:  1. Abdominal pain, epigastric    2. Non-intractable vomiting with nausea, unspecified vomiting type    3. Diarrhea, unspecified type        Disposition referral (if applicable):   [address] [place] [address]  [phone]    Schedule an appointment as soon as possible for a visit       [facility] Emergency Department  [address] [place] [address]  [phone]    If symptoms worsen      Disposition medications (if applicable):  New Prescriptions    ONDANSETRON (ZOFRAN) 4 MG TABLET    Take 1 tablet by mouth every 8 hours as needed for Nausea or Vomiting       ED Provider Disposition Time  DISPOSITION            Electronically signed by: Kai Henriquez M.D., 3/11/2021 10:25 AM      This dictation was created with voice recognition software. While attempts have been made to review the dictation as it is transcribed, on occasion the spoken word can be misinterpreted by the technology leading to omissions or inappropriate words, phrases or sentences.       Melanie Candelario MD  03/11/21 [id number]

## 2021-03-11 NOTE — ED NOTES
Bed: ED-27  Expected date:   Expected time:   Means of arrival:   Comments:  ems     Erik Alfred RN  03/11/21 1209

## 2021-03-12 LAB
EKG ATRIAL RATE: 69 BPM
EKG DIAGNOSIS: NORMAL
EKG P AXIS: 38 DEGREES
EKG P-R INTERVAL: 168 MS
EKG Q-T INTERVAL: 450 MS
EKG QRS DURATION: 94 MS
EKG QTC CALCULATION (BAZETT): 482 MS
EKG R AXIS: 36 DEGREES
EKG T AXIS: 159 DEGREES
EKG VENTRICULAR RATE: 69 BPM

## 2021-03-12 PROCEDURE — 93010 ELECTROCARDIOGRAM REPORT: CPT | Performed by: INTERNAL MEDICINE

## 2021-04-12 NOTE — ED NOTES
DR Lawson Kennedy  10/22/20 9845 [FreeTextEntry1] : Rash of the umbilicus. [de-identified] : She is s/p cholecystectomy.

## 2021-06-07 ENCOUNTER — APPOINTMENT (OUTPATIENT)
Dept: GENERAL RADIOLOGY | Age: 71
DRG: 300 | End: 2021-06-07
Payer: MEDICARE

## 2021-06-07 ENCOUNTER — HOSPITAL ENCOUNTER (INPATIENT)
Age: 71
LOS: 8 days | Discharge: HOME | DRG: 300 | End: 2021-06-15
Attending: EMERGENCY MEDICINE | Admitting: STUDENT IN AN ORGANIZED HEALTH CARE EDUCATION/TRAINING PROGRAM
Payer: MEDICARE

## 2021-06-07 ENCOUNTER — APPOINTMENT (OUTPATIENT)
Dept: ULTRASOUND IMAGING | Age: 71
DRG: 300 | End: 2021-06-07
Payer: MEDICARE

## 2021-06-07 DIAGNOSIS — L97.509 DIABETIC FOOT ULCER WITH OSTEOMYELITIS (HCC): ICD-10-CM

## 2021-06-07 DIAGNOSIS — Z86.39 HISTORY OF DIABETES MELLITUS: ICD-10-CM

## 2021-06-07 DIAGNOSIS — M86.9 DIABETIC FOOT ULCER WITH OSTEOMYELITIS (HCC): ICD-10-CM

## 2021-06-07 DIAGNOSIS — Z79.2 LONG TERM (CURRENT) USE OF ANTIBIOTICS: Primary | ICD-10-CM

## 2021-06-07 DIAGNOSIS — M86.9 OSTEOMYELITIS, UNSPECIFIED SITE, UNSPECIFIED TYPE (HCC): ICD-10-CM

## 2021-06-07 DIAGNOSIS — R79.89 ELEVATED BRAIN NATRIURETIC PEPTIDE (BNP) LEVEL: ICD-10-CM

## 2021-06-07 DIAGNOSIS — E11.69 DIABETIC FOOT ULCER WITH OSTEOMYELITIS (HCC): ICD-10-CM

## 2021-06-07 DIAGNOSIS — E11.621 DIABETIC FOOT ULCER WITH OSTEOMYELITIS (HCC): ICD-10-CM

## 2021-06-07 DIAGNOSIS — B87.9 INFESTATION BY MAGGOTS: ICD-10-CM

## 2021-06-07 DIAGNOSIS — Z51.89 VISIT FOR WOUND CHECK: ICD-10-CM

## 2021-06-07 DIAGNOSIS — L08.9 FOOT INFECTION: ICD-10-CM

## 2021-06-07 LAB
ALBUMIN SERPL-MCNC: 3.4 GM/DL (ref 3.4–5)
ALP BLD-CCNC: 73 IU/L (ref 40–129)
ALT SERPL-CCNC: 6 U/L (ref 10–40)
ANION GAP SERPL CALCULATED.3IONS-SCNC: 8 MMOL/L (ref 4–16)
AST SERPL-CCNC: 8 IU/L (ref 15–37)
BASOPHILS ABSOLUTE: 0 K/CU MM
BASOPHILS RELATIVE PERCENT: 0.4 % (ref 0–1)
BILIRUB SERPL-MCNC: 0.6 MG/DL (ref 0–1)
BUN BLDV-MCNC: 9 MG/DL (ref 6–23)
CALCIUM SERPL-MCNC: 8.4 MG/DL (ref 8.3–10.6)
CHLORIDE BLD-SCNC: 101 MMOL/L (ref 99–110)
CO2: 28 MMOL/L (ref 21–32)
CREAT SERPL-MCNC: 1.4 MG/DL (ref 0.9–1.3)
DIFFERENTIAL TYPE: ABNORMAL
EOSINOPHILS ABSOLUTE: 0.1 K/CU MM
EOSINOPHILS RELATIVE PERCENT: 1.8 % (ref 0–3)
GFR AFRICAN AMERICAN: >60 ML/MIN/1.73M2
GFR NON-AFRICAN AMERICAN: 50 ML/MIN/1.73M2
GLUCOSE BLD-MCNC: 213 MG/DL (ref 70–99)
HCT VFR BLD CALC: 34.7 % (ref 42–52)
HEMOGLOBIN: 10.9 GM/DL (ref 13.5–18)
IMMATURE NEUTROPHIL %: 0.1 % (ref 0–0.43)
LACTATE: 0.8 MMOL/L (ref 0.4–2)
LYMPHOCYTES ABSOLUTE: 1 K/CU MM
LYMPHOCYTES RELATIVE PERCENT: 13.4 % (ref 24–44)
MCH RBC QN AUTO: 28.8 PG (ref 27–31)
MCHC RBC AUTO-ENTMCNC: 31.4 % (ref 32–36)
MCV RBC AUTO: 91.8 FL (ref 78–100)
MONOCYTES ABSOLUTE: 0.3 K/CU MM
MONOCYTES RELATIVE PERCENT: 3.8 % (ref 0–4)
NUCLEATED RBC %: 0 %
PDW BLD-RTO: 14.6 % (ref 11.7–14.9)
PLATELET # BLD: 258 K/CU MM (ref 140–440)
PMV BLD AUTO: 9.7 FL (ref 7.5–11.1)
POTASSIUM SERPL-SCNC: 4.2 MMOL/L (ref 3.5–5.1)
PRO-BNP: 4762 PG/ML
RBC # BLD: 3.78 M/CU MM (ref 4.6–6.2)
SEGMENTED NEUTROPHILS ABSOLUTE COUNT: 5.7 K/CU MM
SEGMENTED NEUTROPHILS RELATIVE PERCENT: 80.5 % (ref 36–66)
SODIUM BLD-SCNC: 137 MMOL/L (ref 135–145)
TOTAL CK: 120 IU/L (ref 38–174)
TOTAL IMMATURE NEUTOROPHIL: 0.01 K/CU MM
TOTAL NUCLEATED RBC: 0 K/CU MM
TOTAL PROTEIN: 7 GM/DL (ref 6.4–8.2)
WBC # BLD: 7.1 K/CU MM (ref 4–10.5)

## 2021-06-07 PROCEDURE — 73630 X-RAY EXAM OF FOOT: CPT

## 2021-06-07 PROCEDURE — 83880 ASSAY OF NATRIURETIC PEPTIDE: CPT

## 2021-06-07 PROCEDURE — 80053 COMPREHEN METABOLIC PANEL: CPT

## 2021-06-07 PROCEDURE — 82550 ASSAY OF CK (CPK): CPT

## 2021-06-07 PROCEDURE — 1200000000 HC SEMI PRIVATE

## 2021-06-07 PROCEDURE — 96374 THER/PROPH/DIAG INJ IV PUSH: CPT

## 2021-06-07 PROCEDURE — 96375 TX/PRO/DX INJ NEW DRUG ADDON: CPT

## 2021-06-07 PROCEDURE — 85025 COMPLETE CBC W/AUTO DIFF WBC: CPT

## 2021-06-07 PROCEDURE — 2580000003 HC RX 258: Performed by: EMERGENCY MEDICINE

## 2021-06-07 PROCEDURE — 83036 HEMOGLOBIN GLYCOSYLATED A1C: CPT

## 2021-06-07 PROCEDURE — 87040 BLOOD CULTURE FOR BACTERIA: CPT

## 2021-06-07 PROCEDURE — 99283 EMERGENCY DEPT VISIT LOW MDM: CPT

## 2021-06-07 PROCEDURE — 83605 ASSAY OF LACTIC ACID: CPT

## 2021-06-07 PROCEDURE — 6360000002 HC RX W HCPCS: Performed by: EMERGENCY MEDICINE

## 2021-06-07 PROCEDURE — 36415 COLL VENOUS BLD VENIPUNCTURE: CPT

## 2021-06-07 PROCEDURE — 93970 EXTREMITY STUDY: CPT

## 2021-06-07 RX ORDER — SODIUM CHLORIDE 9 MG/ML
25 INJECTION, SOLUTION INTRAVENOUS PRN
Status: DISCONTINUED | OUTPATIENT
Start: 2021-06-07 | End: 2021-06-15 | Stop reason: HOSPADM

## 2021-06-07 RX ORDER — DEXTROSE MONOHYDRATE 25 G/50ML
12.5 INJECTION, SOLUTION INTRAVENOUS PRN
Status: DISCONTINUED | OUTPATIENT
Start: 2021-06-07 | End: 2021-06-15 | Stop reason: HOSPADM

## 2021-06-07 RX ORDER — TORSEMIDE 20 MG/1
100 TABLET ORAL 2 TIMES DAILY
Status: DISCONTINUED | OUTPATIENT
Start: 2021-06-08 | End: 2021-06-10

## 2021-06-07 RX ORDER — MORPHINE SULFATE 4 MG/ML
4 INJECTION, SOLUTION INTRAMUSCULAR; INTRAVENOUS
Status: DISCONTINUED | OUTPATIENT
Start: 2021-06-07 | End: 2021-06-15 | Stop reason: HOSPADM

## 2021-06-07 RX ORDER — POLYETHYLENE GLYCOL 3350 17 G/17G
17 POWDER, FOR SOLUTION ORAL DAILY PRN
Status: DISCONTINUED | OUTPATIENT
Start: 2021-06-07 | End: 2021-06-15 | Stop reason: HOSPADM

## 2021-06-07 RX ORDER — MORPHINE SULFATE 4 MG/ML
2 INJECTION, SOLUTION INTRAMUSCULAR; INTRAVENOUS
Status: DISCONTINUED | OUTPATIENT
Start: 2021-06-07 | End: 2021-06-15 | Stop reason: HOSPADM

## 2021-06-07 RX ORDER — ACETAMINOPHEN 650 MG/1
650 SUPPOSITORY RECTAL EVERY 6 HOURS PRN
Status: DISCONTINUED | OUTPATIENT
Start: 2021-06-07 | End: 2021-06-15 | Stop reason: HOSPADM

## 2021-06-07 RX ORDER — MORPHINE SULFATE 4 MG/ML
4 INJECTION, SOLUTION INTRAMUSCULAR; INTRAVENOUS EVERY 30 MIN PRN
Status: DISCONTINUED | OUTPATIENT
Start: 2021-06-07 | End: 2021-06-07

## 2021-06-07 RX ORDER — ATORVASTATIN CALCIUM 40 MG/1
40 TABLET, FILM COATED ORAL NIGHTLY
Status: DISCONTINUED | OUTPATIENT
Start: 2021-06-08 | End: 2021-06-15 | Stop reason: HOSPADM

## 2021-06-07 RX ORDER — CLOPIDOGREL BISULFATE 75 MG/1
75 TABLET ORAL DAILY
Status: DISCONTINUED | OUTPATIENT
Start: 2021-06-08 | End: 2021-06-15 | Stop reason: HOSPADM

## 2021-06-07 RX ORDER — ACETAMINOPHEN 325 MG/1
650 TABLET ORAL EVERY 6 HOURS PRN
Status: DISCONTINUED | OUTPATIENT
Start: 2021-06-07 | End: 2021-06-15 | Stop reason: HOSPADM

## 2021-06-07 RX ORDER — ONDANSETRON 2 MG/ML
4 INJECTION INTRAMUSCULAR; INTRAVENOUS EVERY 6 HOURS PRN
Status: DISCONTINUED | OUTPATIENT
Start: 2021-06-07 | End: 2021-06-15 | Stop reason: HOSPADM

## 2021-06-07 RX ORDER — ALBUTEROL SULFATE 90 UG/1
2 AEROSOL, METERED RESPIRATORY (INHALATION) EVERY 4 HOURS PRN
Status: DISCONTINUED | OUTPATIENT
Start: 2021-06-07 | End: 2021-06-15 | Stop reason: HOSPADM

## 2021-06-07 RX ORDER — PREGABALIN 75 MG/1
150 CAPSULE ORAL 3 TIMES DAILY
Status: DISCONTINUED | OUTPATIENT
Start: 2021-06-08 | End: 2021-06-15 | Stop reason: HOSPADM

## 2021-06-07 RX ORDER — DEXTROSE MONOHYDRATE 50 MG/ML
100 INJECTION, SOLUTION INTRAVENOUS PRN
Status: DISCONTINUED | OUTPATIENT
Start: 2021-06-07 | End: 2021-06-15 | Stop reason: HOSPADM

## 2021-06-07 RX ORDER — DOXAZOSIN MESYLATE 4 MG/1
4 TABLET ORAL 2 TIMES DAILY
Status: DISCONTINUED | OUTPATIENT
Start: 2021-06-08 | End: 2021-06-11

## 2021-06-07 RX ORDER — SODIUM CHLORIDE 0.9 % (FLUSH) 0.9 %
5-40 SYRINGE (ML) INJECTION PRN
Status: DISCONTINUED | OUTPATIENT
Start: 2021-06-07 | End: 2021-06-15 | Stop reason: HOSPADM

## 2021-06-07 RX ORDER — ONDANSETRON 2 MG/ML
4 INJECTION INTRAMUSCULAR; INTRAVENOUS EVERY 30 MIN PRN
Status: DISCONTINUED | OUTPATIENT
Start: 2021-06-07 | End: 2021-06-07

## 2021-06-07 RX ORDER — SODIUM CHLORIDE 0.9 % (FLUSH) 0.9 %
5-40 SYRINGE (ML) INJECTION 2 TIMES DAILY
Status: DISCONTINUED | OUTPATIENT
Start: 2021-06-08 | End: 2021-06-15 | Stop reason: HOSPADM

## 2021-06-07 RX ORDER — DICYCLOMINE HYDROCHLORIDE 10 MG/1
10 CAPSULE ORAL
Status: DISCONTINUED | OUTPATIENT
Start: 2021-06-08 | End: 2021-06-15 | Stop reason: HOSPADM

## 2021-06-07 RX ORDER — ONDANSETRON 4 MG/1
4 TABLET, ORALLY DISINTEGRATING ORAL EVERY 8 HOURS PRN
Status: DISCONTINUED | OUTPATIENT
Start: 2021-06-07 | End: 2021-06-15 | Stop reason: HOSPADM

## 2021-06-07 RX ORDER — CARVEDILOL 3.12 MG/1
3.12 TABLET ORAL 2 TIMES DAILY
Status: DISCONTINUED | OUTPATIENT
Start: 2021-06-08 | End: 2021-06-15 | Stop reason: HOSPADM

## 2021-06-07 RX ORDER — NICOTINE POLACRILEX 4 MG
15 LOZENGE BUCCAL PRN
Status: DISCONTINUED | OUTPATIENT
Start: 2021-06-07 | End: 2021-06-15 | Stop reason: HOSPADM

## 2021-06-07 RX ADMIN — ONDANSETRON 4 MG: 2 INJECTION INTRAMUSCULAR; INTRAVENOUS at 21:13

## 2021-06-07 RX ADMIN — MORPHINE SULFATE 4 MG: 4 INJECTION, SOLUTION INTRAMUSCULAR; INTRAVENOUS at 21:12

## 2021-06-07 RX ADMIN — VANCOMYCIN HYDROCHLORIDE 2000 MG: 1 INJECTION, POWDER, LYOPHILIZED, FOR SOLUTION INTRAVENOUS at 21:41

## 2021-06-07 ASSESSMENT — ENCOUNTER SYMPTOMS
ALLERGIC/IMMUNOLOGIC NEGATIVE: 1
EYES NEGATIVE: 1
RESPIRATORY NEGATIVE: 1
GASTROINTESTINAL NEGATIVE: 1

## 2021-06-07 ASSESSMENT — PAIN DESCRIPTION - DESCRIPTORS: DESCRIPTORS: BURNING;THROBBING

## 2021-06-07 ASSESSMENT — PAIN SCALES - GENERAL
PAINLEVEL_OUTOF10: 10
PAINLEVEL_OUTOF10: 10

## 2021-06-07 ASSESSMENT — PAIN DESCRIPTION - ORIENTATION: ORIENTATION: RIGHT;LEFT

## 2021-06-07 ASSESSMENT — PAIN DESCRIPTION - LOCATION: LOCATION: FOOT

## 2021-06-07 NOTE — ED PROVIDER NOTES
[facility]      TRIAGE CHIEF COMPLAINT:   Wound Check (bilateral feet; states infection to amputation sites on bilateral feet, maggots)      [place]:  Sree Cheng is a 79 y.o. male that presents with complaint of wound check. Patient is a history of diabetes and toe amputation before. Patient states for the last month he has had increased swelling to both feet, legs and pain to his both feet and the drainage from his great toes and maggots infestation in them. Denies any fevers nausea vomiting chest pain shortness of breath weakness no new paresthesias has history of diabetic neuropathy. No other questions or concerns. He has not seen his surgeon podiatry for this in the last month. REVIEW OF SYSTEMS:  At least 10 systems reviewed and otherwise acutely negative except as in the [address] Review of Systems   Constitutional: Negative. HENT: Negative. Eyes: Negative. Respiratory: Negative. Cardiovascular: Positive for leg swelling. Gastrointestinal: Negative. Endocrine: Negative. Genitourinary: Negative. Musculoskeletal: Positive for arthralgias and myalgias. Skin: Positive for wound. Allergic/Immunologic: Negative. Neurological: Negative. Hematological: Negative. Psychiatric/Behavioral: Negative. All other systems reviewed and are negative.       Past Medical History:   Diagnosis Date    Acid reflux     Acute MI [facility]) 2004, 2008    Arthritis     Back    Broken teeth     Upper Front    CAD (coronary artery disease)     Sees Dr. Niki Valencia [facility])     per old chart    CHF (congestive heart failure) ([facility]     Chronic back pain     Chronic kidney disease     STAGE 3 KIDNEY FAILURE- \"from my diabetes- do not follow with any one- have seen Dr Vanda Knowles in the past\"    Diabetes mellitus ([address] Dx 1965    per old chart pt has been diabetic since age 13    Diabetic neuropathy ([facility]     \"in my feet\"    H/O cardiovascular stress test 08/25/2016    H/O Doppler ultrasound 09/27/2018    Moderate disease of the right lower extremity with an JALEN of 0.72.   Moderate to severe disease of the left lower extremity with an JALEN of 0.55.   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 09/27/2018    PVD (peripheral vascular disease) ([facility]     Sick sinus syndrome ([facility]     Sleep apnea     \"sleep study 3 yrs ago- could not tolerate the cpap made me too dry\"    Spinal stenosis     Teeth missing     Upper And Lower    Type 2 diabetes mellitus without complication [facility])      Past Surgical History:   Procedure Laterality Date    CARDIAC CATHETERIZATION      per old chart done 10/2014    CARDIAC CATHETERIZATION  07/14/2017    with angiography of leg    CARDIAC CATHETERIZATION  11/20/2018    PATENT STENTS OF ALL THREE MAJOR VESSELS    CARDIAC DEFIBRILLATOR PLACEMENT  06/04/2010    [employer] [address] Defibrillator Implanted    COLECTOMY Right 08/26/2016    laparascopic; robotic assisted    COLONOSCOPY  08/04/2016    CORONARY ANGIOPLASTY      \"15 Heart Stents\"    CORONARY ANGIOPLASTY WITH STENT PLACEMENT      per old chart had angio with stent to circumflex and obtuse marginal artery at [facility] 5/2010( old chart also gives hx of stent placement done 2000,2004 and 2005)    DENTAL SURGERY      Teeth Extracted In Past    PACEMAKER PLACEMENT  06/04/2010    [employer] [address] Defibrillator Implanted    TOE AMPUTATION Right 09/12/2017    Rt 3rd toe    TOE AMPUTATION Right 01/09/2018     Right 5th toe amputation and Toenails trimmed left 2,3,4 and 5th toes    TOE AMPUTATION Left 12/26/2020    LEFT GREAT TOE AMPUTATION performed by Javier Keating MD at [facility]      per old chart had balloon angioplasty right superfical femoral artery,right popliteal artery,,right ant.tibial artery, right tibioperoneal trunk, and right post.tibial artery wna stent placement right popliteal artery and superfical femoral artery 7/2012     Family History   Problem Relation Age of Onset    Diabetes Mother     Stroke Mother     High Blood Pressure Mother     Vision Loss Mother     Cancer Father         Prostate Cancer    Diabetes Sister     Neuropathy Sister     Other Sister         \"Breathing Problems\"    Heart Disease Sister     Early Death Sister 62        Heart Complications    Cancer Brother         \"Stomach Cancer\"    High Blood Pressure Brother     Diabetes Brother     Heart Disease Brother     High Blood Pressure Brother     Cancer Son         \"Testicle Cancer\"     Social History     Socioeconomic History    Marital status:       Spouse name: Not on file    Number of children: Not on file    Years of education: Not on file    Highest education level: Not on file   Occupational History    Not on file   Tobacco Use    Smoking status: Former Smoker     Packs/day: 0.00     Years: 36.00     Pack years: 0.00     Types: Cigars     Start date: 1/1/1980    Smokeless tobacco: Never Used   Vaping Use    Vaping Use: Never used   Substance and Sexual Activity    Alcohol use: No    Drug use: Yes     Types: Marijuana    Sexual activity: Never   Other Topics Concern    Not on file   Social History Narrative    Not on file     Social Determinants of Health     Financial Resource Strain:     Difficulty of Paying Living Expenses:    Food Insecurity:     Worried About Running Out of Food in the Last Year:     [address] in the Last Year:    Transportation Needs:     Lack of Transportation (Medical):      Lack of Transportation (Non-Medical):    Physical Activity:     Days of Exercise per Week:     Minutes of Exercise per Session:    Stress:     Feeling of Stress :    Social Connections:     Frequency of Communication with Friends and Family:     Frequency of Social Gatherings with Friends and Family:     Attends [employer] Services:     Active Member of Clubs or Organizations:     Attends Club or Organization Meetings:     Marital Status:    Intimate Partner Violence:     Fear of Current or Ex-Partner:     Emotionally Abused:     Physically Abused:     Sexually Abused:      Current Facility-Administered Medications   Medication Dose Route Frequency Provider Last Rate Last Admin    morphine sulfate (PF) injection 4 mg  4 mg Intravenous Q30 Min PRN Marthena Richardson, DO   4 mg at 06/07/21 2112    ondansetron (ZOFRAN) injection 4 mg  4 mg Intravenous Q30 Min PRN Marthena Richardson, DO   4 mg at 06/07/21 2113    vancomycin (VANCOCIN) 2,000 mg in dextrose 5 % 500 mL IVPB  2,000 mg Intravenous Once Marthena Richardson,  mL/hr at 06/07/21 2141 2,000 mg at 06/07/21 2141    meropenem (MERREM) 1,000 mg in sodium chloride 0.9 % 100 mL IVPB (mini-bag)  1,000 mg Intravenous Q8H Marthena Richardson, DO         Current Outpatient Medications   Medication Sig Dispense Refill    torsemide (DEMADEX) 100 MG tablet TAKE 1 TABLET BY MOUTH TWICE A DAY 60 tablet 3    ondansetron (ZOFRAN) 4 MG tablet Take 1 tablet by mouth every 8 hours as needed for Nausea or Vomiting 8 tablet 0    doxazosin (CARDURA) 4 MG tablet TAKE 1 TABLET BY MOUTH TWICE A DAY 60 tablet 5    acetaminophen (TYLENOL) 500 MG tablet Take 2 tablets by mouth 3 times daily as needed for Pain 120 tablet 0    pregabalin (LYRICA) 150 MG capsule TAKE 1 CAPSULE BY MOUTH THREE TIMES A DAY      doxazosin (CARDURA) 2 MG tablet Take 2 tablets by mouth 2 times daily 90 tablet 3    insulin lispro (HUMALOG) 100 UNIT/ML injection vial Inject 0-6 Units into the skin 3 times daily (with meals) 1 vial 3    atorvastatin (LIPITOR) 40 MG tablet Take 1 tablet by mouth nightly 30 tablet 3    carvedilol (COREG) 3.125 MG tablet Take 1 tablet by mouth 2 times daily 60 tablet 3    dicyclomine (BENTYL) 10 MG capsule Take 1 capsule by mouth 4 times daily (before meals and nightly) 120 capsule 3    albuterol sulfate HFA (VENTOLIN HFA) 108 (90 Base) MCG/ACT inhaler Inhale 2 puffs into the lungs every 4 hours as needed for Wheezing 1 Inhaler 3    Calcium Alginate (ALGICELL CALCIUM DRESSING 4\"X8) MISC Apply 1 Device topically daily 30 each 3    PROMOGRAN COREY WOUND DRESSING MATRIX Apply topically Apply to left daily and cover with gauze 1 Package 3    clopidogrel (PLAVIX) 75 MG tablet Take 1 tablet by mouth daily 30 tablet 11      Allergies   Allergen Reactions    Pcn [Penicillins] Hives    Fentanyl Itching     Current Facility-Administered Medications   Medication Dose Route Frequency Provider Last Rate Last Admin    morphine sulfate (PF) injection 4 mg  4 mg Intravenous Q30 Min PRN Clance Nones, DO   4 mg at 06/07/21 2112    ondansetron (ZOFRAN) injection 4 mg  4 mg Intravenous Q30 Min PRN Clance Nones, DO   4 mg at 06/07/21 2113    vancomycin (VANCOCIN) 2,000 mg in dextrose 5 % 500 mL IVPB  2,000 mg Intravenous Once Clance Nones,  mL/hr at 06/07/21 2141 2,000 mg at 06/07/21 2141    meropenem (MERREM) 1,000 mg in sodium chloride 0.9 % 100 mL IVPB (mini-bag)  1,000 mg Intravenous Q8H Clance Nones, DO         Current Outpatient Medications   Medication Sig Dispense Refill    torsemide (DEMADEX) 100 MG tablet TAKE 1 TABLET BY MOUTH TWICE A DAY 60 tablet 3    ondansetron (ZOFRAN) 4 MG tablet Take 1 tablet by mouth every 8 hours as needed for Nausea or Vomiting 8 tablet 0    doxazosin (CARDURA) 4 MG tablet TAKE 1 TABLET BY MOUTH TWICE A DAY 60 tablet 5    acetaminophen (TYLENOL) 500 MG tablet Take 2 tablets by mouth 3 times daily as needed for Pain 120 tablet 0    pregabalin (LYRICA) 150 MG capsule TAKE 1 CAPSULE BY MOUTH THREE TIMES A DAY      doxazosin (CARDURA) 2 MG tablet Take 2 tablets by mouth 2 times daily 90 tablet 3    insulin lispro (HUMALOG) 100 UNIT/ML injection vial Inject 0-6 Units into the skin 3 times daily (with meals) 1 vial 3    atorvastatin (LIPITOR) 40 MG tablet Take 1 tablet by mouth nightly 30 tablet 3    carvedilol (COREG) 3.125 MG tablet Take 1 tablet by mouth 2 times daily 60 tablet 3    dicyclomine (BENTYL) 10 MG capsule Take 1 capsule by mouth 4 times daily (before meals and nightly) 120 capsule 3    albuterol sulfate HFA (VENTOLIN HFA) 108 (90 Base) MCG/ACT inhaler Inhale 2 puffs into the lungs every 4 hours as needed for Wheezing 1 Inhaler 3    Calcium Alginate (ALGICELL CALCIUM DRESSING 4\"X8) MISC Apply 1 Device topically daily 30 each 3    PROMOGRAN COREY WOUND DRESSING MATRIX Apply topically Apply to left daily and cover with gauze 1 Package 3    clopidogrel (PLAVIX) 75 MG tablet Take 1 tablet by mouth daily 30 tablet 11       Nursing Notes Reviewed    VITAL SIGNS:  ED Triage Vitals [06/07/21 1715]   Enc Vitals Group      BP (!) 197/89      Pulse 73      Resp 18      Temp 99.1 °F (37.3 °C)      Temp Source Oral      SpO2 97 %      Weight 260 lb (117.9 kg)      Height 6' (1.829 m)      Head Circumference       Peak Flow       Pain Score       Pain Loc       Pain Edu? Excl. in [address]? PHYSICAL EXAM:  Physical Exam  Vitals and nursing note reviewed. Constitutional:       General: He is not in acute distress. Appearance: Normal appearance. He is well-developed and well-groomed. He is not ill-appearing, toxic-appearing or diaphoretic. HENT:      Head: Normocephalic and atraumatic. Right Ear: External ear normal.      Left Ear: External ear normal.      Nose: No congestion or rhinorrhea. Eyes:      General: No scleral icterus. Right eye: No discharge.          Left eye: No discharge. Extraocular Movements: Extraocular movements intact. Conjunctiva/sclera: Conjunctivae normal.   Neck:      Vascular: No JVD. Trachea: Phonation normal.   Cardiovascular:      Rate and Rhythm: Normal rate and regular rhythm. Pulses: Normal pulses. Heart sounds: Normal heart sounds. No murmur heard. No friction rub. No gallop. Pulmonary:      Effort: Pulmonary effort is normal. No respiratory distress. Breath sounds: Normal breath sounds. No stridor. No wheezing, rhonchi or rales. Abdominal:      General: Bowel sounds are normal. There is no distension. Palpations: Abdomen is soft. There is no mass. Tenderness: There is no abdominal tenderness. There is no guarding or rebound. Negative signs include Palma's sign, Rovsing's sign and McBurney's sign. Hernia: No hernia is present. Musculoskeletal:         General: Swelling and tenderness present. No deformity or signs of injury. Cervical back: Full passive range of motion without pain and normal range of motion. No edema, erythema, signs of trauma, rigidity, torticollis or crepitus. No pain with movement. Normal range of motion. Right lower leg: Edema present. Left lower leg: Edema present. Skin:     General: Skin is warm. Coloration: Skin is not jaundiced or pale. Findings: Erythema and wound present. No bruising, lesion or rash. Comments: Bilateral great toe infections maggots coming from the wound purulent drainage erythema no crepitus   Neurological:      General: No focal deficit present. Mental Status: He is alert and oriented to person, place, and time. GCS: GCS eye subscore is 4. GCS verbal subscore is 5. GCS motor subscore is 6. Cranial Nerves: Cranial nerves are intact. No cranial nerve deficit, dysarthria or facial asymmetry. Sensory: Sensation is intact. No sensory deficit. Motor: Motor function is intact.  No weakness, atrophy, Anion Gap 8 4 - 16   Lactic Acid, Plasma   Result Value Ref Range    Lactate 0.8 0.4 - 2.0 mMOL/L   CK   Result Value Ref Range    Total  38 - 174 IU/L   Brain Natriuretic Peptide   Result Value Ref Range    Pro-BNP 4,762 (H) <300 PG/ML        Radiographs (if obtained):  [] The following radiograph was interpreted by myself in the absence of a radiologist:  [x] Radiologist's Report Reviewed:    Xray bilateral feet, VL duplex legs    EKG (if obtained): (All EKG's are interpreted by myself in the absence of a cardiologist)    Total critical care time today provided was at least 32 minutes. This excludes seperately billable procedure. Critical care time provided for reviewing labs, images, giving antibiotics, consulting medicine that required close evaluation and/or intervention with concern for patient decompensation. MDM:    Patient here with bilateral feet pain, swelling, drainage. Again patient is a history of diabetes and multiple toe irritations. For the last month he states he has been having maggots ground of his feet, drainage, pain, swelling. He has not seen another doctor about this in the last month. He otherwise appears well vital signs are stable. Has a history of diabetes, neuropathy he has good pulses no new paresthesias. Does have obvious maggots and purulence coming from his feet bilaterally no crepitus will get x-rays of his feet ultrasounds of his legs he states he is on anticoagulation. We will give him antibiotics broad-spectrum. Will get labs imaging patient likely needs admission for general surgery versus podiatry evaluation versus amputation otherwise stable. Patient had work-up performed does have possible osteomyelitis of his great toe. Awaiting ultrasound we will give patient again pain medicine antibiotics will admit to hospital medicine will need wound care and podiatry evaluation otherwise stable. Admitted. Ultrasounds negative for DVT inconclusive below the knees. Again imaging concerning for osteomyelitis given antibiotics will consult hospital medicine who will admit. Otherwise stable. Admitted. CLINICAL IMPRESSION:  Final diagnoses:   Visit for wound check   Foot infection   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   History of diabetes mellitus   O[facility]omyel[facility], unspecified site, unspecified [facility]   Elevated brain natriuretic peptide (BNP) level       (Please note that portions of this note may have been completed with a voice recognition program. Efforts were made to edit the dictations but occasionally words aremis-transcribed.)    DISPOSITION REFERRAL (if applicable):  No follow-up provider specified.     DISPOSITION MEDICATIONS (if applicable):  New Prescriptions    No medications on file          Jayden Poe, [facility],   06/07/21 [id number]       Jayden Poe, DO  06/07/21 1430

## 2021-06-07 NOTE — Clinical Note
Patient Class: Inpatient [101]   REQUIRED: Diagnosis: Diabetic foot ulcer with osteomyelitis ([facility] [[id number]]   Estimated Length of Stay: Estimated stay of more than 2 midnights   Telemetry/Cardiac Monitoring Required?: No

## 2021-06-08 ENCOUNTER — APPOINTMENT (OUTPATIENT)
Dept: ULTRASOUND IMAGING | Age: 71
DRG: 300 | End: 2021-06-08
Payer: MEDICARE

## 2021-06-08 PROBLEM — E44.0 MODERATE MALNUTRITION (HCC): Status: ACTIVE | Noted: 2021-06-08

## 2021-06-08 LAB
ALBUMIN SERPL-MCNC: 2.7 GM/DL (ref 3.4–5)
ALP BLD-CCNC: 63 IU/L (ref 40–128)
ALT SERPL-CCNC: <5 U/L (ref 10–40)
ANION GAP SERPL CALCULATED.3IONS-SCNC: 8 MMOL/L (ref 4–16)
AST SERPL-CCNC: 9 IU/L (ref 15–37)
BACTERIA: NEGATIVE /HPF
BASOPHILS ABSOLUTE: 0 K/CU MM
BASOPHILS RELATIVE PERCENT: 0.6 % (ref 0–1)
BILIRUB SERPL-MCNC: 0.7 MG/DL (ref 0–1)
BILIRUBIN URINE: NEGATIVE MG/DL
BLOOD, URINE: ABNORMAL
BUN BLDV-MCNC: 8 MG/DL (ref 6–23)
C-REACTIVE PROTEIN, HIGH SENSITIVITY: 37.6 MG/L
CALCIUM SERPL-MCNC: 8.2 MG/DL (ref 8.3–10.6)
CHLORIDE BLD-SCNC: 105 MMOL/L (ref 99–110)
CLARITY: CLEAR
CO2: 25 MMOL/L (ref 21–32)
COLOR: YELLOW
CREAT SERPL-MCNC: 1.4 MG/DL (ref 0.9–1.3)
DIFFERENTIAL TYPE: ABNORMAL
EOSINOPHILS ABSOLUTE: 0.1 K/CU MM
EOSINOPHILS RELATIVE PERCENT: 2.4 % (ref 0–3)
ERYTHROCYTE SEDIMENTATION RATE: 66 MM/HR (ref 0–20)
ESTIMATED AVERAGE GLUCOSE: 180 MG/DL
FERRITIN: 102 NG/ML (ref 30–400)
FOLATE: 7.5 NG/ML (ref 3.1–17.5)
GFR AFRICAN AMERICAN: >60 ML/MIN/1.73M2
GFR NON-AFRICAN AMERICAN: 50 ML/MIN/1.73M2
GLUCOSE BLD-MCNC: 119 MG/DL (ref 70–99)
GLUCOSE BLD-MCNC: 139 MG/DL (ref 70–99)
GLUCOSE BLD-MCNC: 141 MG/DL (ref 70–99)
GLUCOSE BLD-MCNC: 146 MG/DL (ref 70–99)
GLUCOSE BLD-MCNC: 170 MG/DL (ref 70–99)
GLUCOSE BLD-MCNC: 176 MG/DL (ref 70–99)
GLUCOSE, URINE: NEGATIVE MG/DL
HBA1C MFR BLD: 7.9 % (ref 4.2–6.3)
HCT VFR BLD CALC: 31.9 % (ref 42–52)
HEMOGLOBIN: 10 GM/DL (ref 13.5–18)
IMMATURE NEUTROPHIL %: 0.2 % (ref 0–0.43)
INR BLD: 1.14 INDEX
IRON: 42 UG/DL (ref 59–158)
KETONES, URINE: NEGATIVE MG/DL
LEUKOCYTE ESTERASE, URINE: NEGATIVE
LYMPHOCYTES ABSOLUTE: 1.1 K/CU MM
LYMPHOCYTES RELATIVE PERCENT: 19.4 % (ref 24–44)
MCH RBC QN AUTO: 28.6 PG (ref 27–31)
MCHC RBC AUTO-ENTMCNC: 31.3 % (ref 32–36)
MCV RBC AUTO: 91.1 FL (ref 78–100)
MONOCYTES ABSOLUTE: 0.4 K/CU MM
MONOCYTES RELATIVE PERCENT: 6.9 % (ref 0–4)
MUCUS: ABNORMAL HPF
NITRITE URINE, QUANTITATIVE: NEGATIVE
NUCLEATED RBC %: 0 %
PCT TRANSFERRIN: 25 % (ref 10–44)
PDW BLD-RTO: 14.6 % (ref 11.7–14.9)
PH, URINE: 5 (ref 5–8)
PLATELET # BLD: 230 K/CU MM (ref 140–440)
PMV BLD AUTO: 9.9 FL (ref 7.5–11.1)
POTASSIUM SERPL-SCNC: 4.5 MMOL/L (ref 3.5–5.1)
PROTEIN UA: 30 MG/DL
PROTHROMBIN TIME: 13.8 SECONDS (ref 11.7–14.5)
RBC # BLD: 3.5 M/CU MM (ref 4.6–6.2)
RBC URINE: <1 /HPF (ref 0–3)
RETICULOCYTE COUNT PCT: 2.3 % (ref 0.2–2.2)
SEGMENTED NEUTROPHILS ABSOLUTE COUNT: 3.8 K/CU MM
SEGMENTED NEUTROPHILS RELATIVE PERCENT: 70.5 % (ref 36–66)
SODIUM BLD-SCNC: 138 MMOL/L (ref 135–145)
SPECIFIC GRAVITY UA: 1.01 (ref 1–1.03)
TOTAL IMMATURE NEUTOROPHIL: 0.01 K/CU MM
TOTAL IRON BINDING CAPACITY: 171 UG/DL (ref 250–450)
TOTAL NUCLEATED RBC: 0 K/CU MM
TOTAL PROTEIN: 5.5 GM/DL (ref 6.4–8.2)
TRICHOMONAS: ABNORMAL /HPF
TROPONIN T: 0.03 NG/ML
UNSATURATED IRON BINDING CAPACITY: 129 UG/DL (ref 110–370)
UROBILINOGEN, URINE: NEGATIVE MG/DL (ref 0.2–1)
VITAMIN B-12: 341.5 PG/ML (ref 211–911)
WBC # BLD: 5.4 K/CU MM (ref 4–10.5)
WBC UA: 1 /HPF (ref 0–2)

## 2021-06-08 PROCEDURE — 6360000002 HC RX W HCPCS: Performed by: STUDENT IN AN ORGANIZED HEALTH CARE EDUCATION/TRAINING PROGRAM

## 2021-06-08 PROCEDURE — 81001 URINALYSIS AUTO W/SCOPE: CPT

## 2021-06-08 PROCEDURE — 1200000000 HC SEMI PRIVATE

## 2021-06-08 PROCEDURE — 82728 ASSAY OF FERRITIN: CPT

## 2021-06-08 PROCEDURE — 99211 OFF/OP EST MAY X REQ PHY/QHP: CPT

## 2021-06-08 PROCEDURE — 83550 IRON BINDING TEST: CPT

## 2021-06-08 PROCEDURE — 82962 GLUCOSE BLOOD TEST: CPT

## 2021-06-08 PROCEDURE — 87070 CULTURE OTHR SPECIMN AEROBIC: CPT

## 2021-06-08 PROCEDURE — 87186 SC STD MICRODIL/AGAR DIL: CPT

## 2021-06-08 PROCEDURE — 85045 AUTOMATED RETICULOCYTE COUNT: CPT

## 2021-06-08 PROCEDURE — 6360000002 HC RX W HCPCS: Performed by: NURSE PRACTITIONER

## 2021-06-08 PROCEDURE — 82570 ASSAY OF URINE CREATININE: CPT

## 2021-06-08 PROCEDURE — 87147 CULTURE TYPE IMMUNOLOGIC: CPT

## 2021-06-08 PROCEDURE — 82746 ASSAY OF FOLIC ACID SERUM: CPT

## 2021-06-08 PROCEDURE — 85025 COMPLETE CBC W/AUTO DIFF WBC: CPT

## 2021-06-08 PROCEDURE — 94150 VITAL CAPACITY TEST: CPT

## 2021-06-08 PROCEDURE — 36415 COLL VENOUS BLD VENIPUNCTURE: CPT

## 2021-06-08 PROCEDURE — 87075 CULTR BACTERIA EXCEPT BLOOD: CPT

## 2021-06-08 PROCEDURE — 6370000000 HC RX 637 (ALT 250 FOR IP): Performed by: STUDENT IN AN ORGANIZED HEALTH CARE EDUCATION/TRAINING PROGRAM

## 2021-06-08 PROCEDURE — 2580000003 HC RX 258: Performed by: STUDENT IN AN ORGANIZED HEALTH CARE EDUCATION/TRAINING PROGRAM

## 2021-06-08 PROCEDURE — 2700000000 HC OXYGEN THERAPY PER DAY

## 2021-06-08 PROCEDURE — 82607 VITAMIN B-12: CPT

## 2021-06-08 PROCEDURE — 85652 RBC SED RATE AUTOMATED: CPT

## 2021-06-08 PROCEDURE — 94761 N-INVAS EAR/PLS OXIMETRY MLT: CPT

## 2021-06-08 PROCEDURE — 83540 ASSAY OF IRON: CPT

## 2021-06-08 PROCEDURE — 2580000003 HC RX 258: Performed by: NURSE PRACTITIONER

## 2021-06-08 PROCEDURE — 84484 ASSAY OF TROPONIN QUANT: CPT

## 2021-06-08 PROCEDURE — 80053 COMPREHEN METABOLIC PANEL: CPT

## 2021-06-08 PROCEDURE — 85610 PROTHROMBIN TIME: CPT

## 2021-06-08 PROCEDURE — 99222 1ST HOSP IP/OBS MODERATE 55: CPT | Performed by: SURGERY

## 2021-06-08 PROCEDURE — 99222 1ST HOSP IP/OBS MODERATE 55: CPT | Performed by: INTERNAL MEDICINE

## 2021-06-08 PROCEDURE — 84300 ASSAY OF URINE SODIUM: CPT

## 2021-06-08 PROCEDURE — 87077 CULTURE AEROBIC IDENTIFY: CPT

## 2021-06-08 PROCEDURE — 94664 DEMO&/EVAL PT USE INHALER: CPT

## 2021-06-08 PROCEDURE — 86140 C-REACTIVE PROTEIN: CPT

## 2021-06-08 PROCEDURE — 6370000000 HC RX 637 (ALT 250 FOR IP): Performed by: INTERNAL MEDICINE

## 2021-06-08 PROCEDURE — 93925 LOWER EXTREMITY STUDY: CPT

## 2021-06-08 PROCEDURE — 84156 ASSAY OF PROTEIN URINE: CPT

## 2021-06-08 RX ORDER — PANTOPRAZOLE SODIUM 40 MG/1
40 TABLET, DELAYED RELEASE ORAL
Status: DISCONTINUED | OUTPATIENT
Start: 2021-06-09 | End: 2021-06-15 | Stop reason: HOSPADM

## 2021-06-08 RX ORDER — AMLODIPINE BESYLATE 10 MG/1
10 TABLET ORAL DAILY
Status: DISCONTINUED | OUTPATIENT
Start: 2021-06-08 | End: 2021-06-15 | Stop reason: HOSPADM

## 2021-06-08 RX ADMIN — VANCOMYCIN HYDROCHLORIDE 1500 MG: 5 INJECTION, POWDER, LYOPHILIZED, FOR SOLUTION INTRAVENOUS at 22:14

## 2021-06-08 RX ADMIN — MORPHINE SULFATE 4 MG: 4 INJECTION, SOLUTION INTRAMUSCULAR; INTRAVENOUS at 17:23

## 2021-06-08 RX ADMIN — INSULIN LISPRO 2 UNITS: 100 INJECTION, SOLUTION INTRAVENOUS; SUBCUTANEOUS at 17:12

## 2021-06-08 RX ADMIN — MORPHINE SULFATE 4 MG: 4 INJECTION, SOLUTION INTRAMUSCULAR; INTRAVENOUS at 06:12

## 2021-06-08 RX ADMIN — PREGABALIN 150 MG: 75 CAPSULE ORAL at 12:37

## 2021-06-08 RX ADMIN — TORSEMIDE 100 MG: 20 TABLET ORAL at 12:31

## 2021-06-08 RX ADMIN — ATORVASTATIN CALCIUM 40 MG: 40 TABLET, FILM COATED ORAL at 20:47

## 2021-06-08 RX ADMIN — DICYCLOMINE HYDROCHLORIDE 10 MG: 10 CAPSULE ORAL at 20:47

## 2021-06-08 RX ADMIN — SODIUM CHLORIDE, PRESERVATIVE FREE 10 ML: 5 INJECTION INTRAVENOUS at 12:38

## 2021-06-08 RX ADMIN — PREGABALIN 150 MG: 75 CAPSULE ORAL at 16:11

## 2021-06-08 RX ADMIN — MEROPENEM 1000 MG: 1 INJECTION, POWDER, FOR SOLUTION INTRAVENOUS at 08:49

## 2021-06-08 RX ADMIN — CARVEDILOL 3.12 MG: 3.12 TABLET, FILM COATED ORAL at 20:47

## 2021-06-08 RX ADMIN — TORSEMIDE 100 MG: 20 TABLET ORAL at 19:22

## 2021-06-08 RX ADMIN — CARVEDILOL 3.12 MG: 3.12 TABLET, FILM COATED ORAL at 12:31

## 2021-06-08 RX ADMIN — MEROPENEM 1000 MG: 1 INJECTION, POWDER, FOR SOLUTION INTRAVENOUS at 00:36

## 2021-06-08 RX ADMIN — DICYCLOMINE HYDROCHLORIDE 10 MG: 10 CAPSULE ORAL at 17:11

## 2021-06-08 RX ADMIN — AMLODIPINE BESYLATE 10 MG: 10 TABLET ORAL at 12:29

## 2021-06-08 RX ADMIN — HYDRALAZINE HYDROCHLORIDE 10 MG: 20 INJECTION INTRAMUSCULAR; INTRAVENOUS at 06:12

## 2021-06-08 RX ADMIN — MORPHINE SULFATE 4 MG: 4 INJECTION, SOLUTION INTRAMUSCULAR; INTRAVENOUS at 00:12

## 2021-06-08 RX ADMIN — DICYCLOMINE HYDROCHLORIDE 10 MG: 10 CAPSULE ORAL at 12:30

## 2021-06-08 RX ADMIN — DOXAZOSIN 4 MG: 4 TABLET ORAL at 20:47

## 2021-06-08 RX ADMIN — MORPHINE SULFATE 4 MG: 4 INJECTION, SOLUTION INTRAMUSCULAR; INTRAVENOUS at 03:44

## 2021-06-08 RX ADMIN — CARVEDILOL 3.12 MG: 3.12 TABLET, FILM COATED ORAL at 00:37

## 2021-06-08 RX ADMIN — MEROPENEM 1000 MG: 1 INJECTION, POWDER, FOR SOLUTION INTRAVENOUS at 16:11

## 2021-06-08 RX ADMIN — DOXAZOSIN 4 MG: 4 TABLET ORAL at 12:30

## 2021-06-08 RX ADMIN — PREGABALIN 150 MG: 75 CAPSULE ORAL at 20:47

## 2021-06-08 ASSESSMENT — PAIN SCALES - GENERAL
PAINLEVEL_OUTOF10: 6
PAINLEVEL_OUTOF10: 0
PAINLEVEL_OUTOF10: 8
PAINLEVEL_OUTOF10: 8
PAINLEVEL_OUTOF10: 0
PAINLEVEL_OUTOF10: 0
PAINLEVEL_OUTOF10: 9
PAINLEVEL_OUTOF10: 0
PAINLEVEL_OUTOF10: 8
PAINLEVEL_OUTOF10: 7
PAINLEVEL_OUTOF10: 0
PAINLEVEL_OUTOF10: 10
PAINLEVEL_OUTOF10: 7
PAINLEVEL_OUTOF10: 8

## 2021-06-08 ASSESSMENT — PAIN DESCRIPTION - DESCRIPTORS
DESCRIPTORS: ACHING
DESCRIPTORS: THROBBING;STABBING

## 2021-06-08 ASSESSMENT — ENCOUNTER SYMPTOMS
ANAL BLEEDING: 0
DIARRHEA: 0
SORE THROAT: 0
RHINORRHEA: 0
BLOOD IN STOOL: 0
ABDOMINAL PAIN: 0
CONSTIPATION: 0
WHEEZING: 0
NAUSEA: 1
COLOR CHANGE: 0
VOMITING: 0
CHEST TIGHTNESS: 0
SHORTNESS OF BREATH: 0
COUGH: 0

## 2021-06-08 ASSESSMENT — PAIN DESCRIPTION - PAIN TYPE
TYPE: ACUTE PAIN;CHRONIC PAIN
TYPE: ACUTE PAIN;CHRONIC PAIN
TYPE: ACUTE PAIN

## 2021-06-08 ASSESSMENT — PAIN DESCRIPTION - PROGRESSION
CLINICAL_PROGRESSION: GRADUALLY WORSENING
CLINICAL_PROGRESSION: NOT CHANGED

## 2021-06-08 ASSESSMENT — PAIN DESCRIPTION - ORIENTATION
ORIENTATION: RIGHT;ANTERIOR
ORIENTATION: RIGHT;LEFT
ORIENTATION: LEFT;RIGHT

## 2021-06-08 ASSESSMENT — PAIN DESCRIPTION - FREQUENCY
FREQUENCY: CONTINUOUS
FREQUENCY: CONTINUOUS

## 2021-06-08 ASSESSMENT — PAIN DESCRIPTION - LOCATION
LOCATION: FOOT

## 2021-06-08 ASSESSMENT — PAIN DESCRIPTION - ONSET
ONSET: ON-GOING
ONSET: ON-GOING

## 2021-06-08 NOTE — CONSULTS
[address] [address] Continence Nurse  Consult Note       Mckenna Mei  AGE: 79 y.o. GENDER: male  : 1950  TODAY'S DATE:  2021    Subjective:     Reason for  Evaluation and Assessment: wound care assessment. Mckenna Mei is a 79 y.o. male referred by:   [x] Physician  [] Nursing  [] Other:     Wound Identification:  Wound Type: diabetic  Contributing Factors: diabetes and chronic pressure        PAST MEDICAL HISTORY        Diagnosis Date    Acid reflux     Acute MI ([facility] ,     Arthritis     Back    Broken teeth     Upper Front    CAD (coronary artery disease)     [facility]es [facility])     per old chart    CHF (congestive heart failure) ([facility]     Chronic back pain     Chronic kidney disease     STAGE 3 KIDNEY FAILURE- \"from my diabetes- do not follow with any one- have seen Dr Falguni Chatman in the past\"    Diabetes mellitus ([facility] Dx [facility]    per old chart pt has been diabetic since age 13    Diabetic neuropathy ([facility]     \"in my feet\"    H/O cardiovascular stress test 2016    H/O Doppler ultrasound 2018    Moderate disease of the right lower extremity with an JALEN of 0.72.   Moderate to severe disease of the left lower extremity with an JALEN of 0.55.   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    Neuropathy     both feet    neuropathy     PAD (peripheral artery disease) ([facility] 2018    PVD (peripheral vascular disease) ([facility]     Sick sinus syndrome (HCC)     Sleep apnea     \"sleep study 3 yrs ago- could not tolerate the cpap made me too dry\"    Spinal stenosis     Teeth missing     Upper And Lower    Type 2 diabetes mellitus without complication ([facility]        PAST SURGICAL HISTORY    Past Surgical History:   Procedure Laterality Date    CARDIAC CATHETERIZATION      per old chart done 10/2014    CARDIAC CATHETERIZATION  07/14/2017    with angiography of leg    CARDIAC CATHETERIZATION  11/20/2018    PATENT STENTS OF ALL THREE MAJOR VESSELS    CARDIAC DEFIBRILLATOR PLACEMENT  06/04/2010    [employer] [address] DR Defibrillator Implanted    COLECTOMY Right 08/26/2016    laparascopic; robotic assisted    COLONOSCOPY  08/04/2016    CORONARY ANGIOPLASTY      \"15 Heart Stents\"    CORONARY ANGIOPLASTY WITH STENT PLACEMENT      per old chart had angio with stent to circumflex and obtuse marginal artery at [facility] 5/2010( old chart also gives hx of stent placement done 2000,2004 and 2005)   [address]      Teeth Extracted In Past    PACEMAKER PLACEMENT  06/04/2010    [employer]tronic Secura DR Defibrillator Implanted    TOE AMPUTATION Right 09/12/2017    Rt 3rd toe    TOE AMPUTATION Right 01/09/2018     Right 5th toe amputation and Toenails trimmed left 2,3,4 and 5th toes    TOE AMPUTATION Left 12/26/2020    LEFT GREAT TOE AMPUTATION performed by Ellen Cortez MD at [facility]      per old chart had balloon angioplasty right superfical femoral artery,right popliteal artery,,right ant.tibial artery, right tibioperoneal trunk, and right post.tibial artery wna stent placement right popliteal artery and superfical femoral artery 7/2012       FAMILY HISTORY    Family History   Problem Relation Age of Onset    Diabetes Mother     Stroke Mother     High Blood Pressure Mother     Vision Loss Mother     Cancer Father         Prostate Cancer    Diabetes Sister     Neuropathy Sister     Other Sister         \"Breathing Problems\"    Heart Disease Sister     Early Death Sister 62        Heart Complications    Cancer Brother         \"Stomach Cancer\"    High Blood Healing % -218 06/08/21 0930   Distance Tunneling (cm) 0 cm 06/08/21 0930   Tunneling Position ___ O'Clock 0 06/08/21 0930   Undermining Starts ___ O'Clock 0 06/08/21 0930   Undermining Ends___ O'Clock 0 06/08/21 0930   Undermining Maxium Distance (cm) 0 06/08/21 0930   Drainage Amount Moderate 06/08/21 0930   Drainage Description Serosanguinous; Yellow 06/08/21 0930   Odor None 06/08/21 0930   Dina-wound Assessment Maceration 06/08/21 0930   Margins Defined edges 06/08/21 0930   Wound Thickness Description not for Pressure Injury Full thickness 06/08/21 0930   Number of days: 180       Wound 12/10/20 Foot Left;Plantar (Active)   Number of days: 180       Wound 12/10/20 Foot Left;Lateral fluid filled blister (Active)   Number of days: 179       Wound 06/08/21 Toe (Comment  which one) Right great toe (Active)   Wound Etiology Diabetic 06/08/21 0930   Dressing Status New dressing applied 06/08/21 0930   Wound Cleansed Cleansed with saline 06/08/21 0930   Wound Length (cm) 1.2 cm 06/08/21 0930   Wound Width (cm) 1.7 cm 06/08/21 0930   Wound Depth (cm) 0.2 cm 06/08/21 0930   Wound Surface Area (cm^2) 2.04 cm^2 06/08/21 0930   Wound Volume (cm^3) 0.41 cm^3 06/08/21 0930   Distance Tunneling (cm) 0 cm 06/08/21 0930   Tunneling Position ___ O'Clock 0 06/08/21 0930   Undermining Starts ___ O'Clock 0 06/08/21 0930   Undermining Ends___ O'Clock 0 06/08/21 0930   Undermining Maxium Distance (cm) 0 06/08/21 0930   Drainage Amount Moderate 06/08/21 0930   Drainage Description Serosanguinous; Yellow 06/08/21 0930   Odor None 06/08/21 0930   Dina-wound Assessment Dry/flaky 06/08/21 0930   Margins Defined edges 06/08/21 0930   Wound Thickness Description not for Pressure Injury Full thickness 06/08/21 0930   Number of days: 0       Wound 06/08/21 Toe (Comment  which one) Left 2nd toe (Active)   Wound Etiology Diabetic 06/08/21 0930   Dressing Status New dressing applied 06/08/21 0930   Wound Cleansed Cleansed with saline 06/08/21 0930   Wound Length (cm) 2.4 cm 06/08/21 0930   Wound Width (cm) 1.2 cm 06/08/21 0930   Wound Depth (cm) 0.3 cm 06/08/21 0930   Wound Surface Area (cm^2) 2.88 cm^2 06/08/21 0930   Wound Volume (cm^3) 0.86 cm^3 06/08/21 0930   Distance Tunneling (cm) 0 cm 06/08/21 0930   Tunneling Position ___ O'Clock 0 06/08/21 0930   Undermining Starts ___ O'Clock 0 06/08/21 0930   Undermining Ends___ O'Clock 0 06/08/21 0930   Undermining Maxium Distance (cm) 0 06/08/21 0930   Drainage Amount Moderate 06/08/21 0930   Drainage Description Serosanguinous; Yellow 06/08/21 0930   Odor None 06/08/21 0930   Dina-wound Assessment Dry/flaky 06/08/21 0930   Margins Defined edges 06/08/21 0930   Wound Thickness Description not for Pressure Injury Full thickness 06/08/21 0930   Number of days: 0       Response to treatment:  Well tolerated by patient. Pain Assessment:  Severity:  0  Quality of pain:   Wound Pain Timing/Severity:   Premedicated: no    Plan:     Plan of Care: Wound 06/08/21 Toe (Comment  which one) Right great toe-Dressing/Treatment:  (betadine moist gauze conform tape)  Wound 12/10/20 Left great toe -Dressing/Treatment:  (betadine moist gauze abd kerlix tape)  Wound 06/08/21 Toe (Comment  which one) Left 2nd toe-Dressing/Treatment:  (betadine moist gauze abd kerlix tape )     Patient in bed agreeable to wound care assessment. Pt has wounds to rt great toe/lt great toe/lt 2nd toe. Cleansed with NS there were maggots removed from left foot wounds x 2 pt is aware that they were there. Wounds are necrotic. Pt had previous amputation by Dr Juan Daniel Robert for left great toe. Pt is diabetic. Applied betadine moist gauze to wounds with dressing as above. Podiatry had been consulted no podiatry on call. Dr Gladys Garvey is to see this patient. Pt is at mild risk for skin breakdown AEB brett. Follow brett orders.      Specialty Bed Required :  yes  [] Low Air Loss   [x] Pressure Redistribution  [] Fluid Immersion  [] Bariatric  [] Total Pressure Relief  [] Other:     Discharge Plan:  Placement for patient upon discharge: tbd  Hospice Care: no  Patient appropriate for Outpatient [address]: [facility]    Patient/Caregiver Teaching:  Level of patient/caregiver understanding able to:  Cares explained as given. Electronically signed by Brendon Umaña.  IFEOMA Miranda,  on 6/8/2021 at 12:18 PM

## 2021-06-08 NOTE — PROGRESS NOTES
Comprehensive Nutrition Assessment    Type and Reason for Visit:  Initial, Wound, Positive Nutrition Screen    Nutrition Recommendations/Plan:   Modify diet to Regular, Carb Control 5 diet to better meet needs for taller stature  Add wound healing supplement BID   Please encourage adequate protein/po intake   Document po intake daily in I/O     Nutrition Assessment:  Pt admitted with diabetic foot ulcer with osteomyletitis. PMH: DM, PVD, CKD 3, CAD s/p stent placement and multiple toe amputation. Pt advanced from NPO to reg diet Carb Control 3. Tolerates 75% of dinner meal. States eating decent at home, not much ambulation, unable to see his foot. Pt had maggots found in LLE. Discussed foot care for diabetes. Noted wt loss of 11.3% over 6 months. Agreed to wound healing supplement dos. Pt at high nutrition risk. Malnutrition Assessment:  Malnutrition Status: Moderate malnutrition    Context:  Chronic Illness     Findings of the 6 clinical characteristics of malnutrition:  Energy Intake:  7 - 75% or less estimated energy requirements for 1 month or longer  Weight Loss:  7 - Greater than 10% over 6 months     Body Fat Loss:  1 - Mild body fat loss Orbital, Buccal region   Muscle Mass Loss:  No significant muscle mass loss    Fluid Accumulation:  1 - Mild Extremities   Strength:  Not Performed    Estimated Daily Nutrient Needs:  Energy (kcal):  8625-6413 (1.1-1.2 kcals/kg); Weight Used for Energy Requirements:  Current     Protein (g):   (1.2-1.5 g/kg); Weight Used for Protein Requirements:  Ideal        Fluid (ml/day):  fluids mgt per nephrology; Method Used for Fluid Requirements:  Standard Renal      Nutrition Related Findings:  , Per physician, target BG between 140-180s. Alb 2.7. ALT <5, AST 9      Wounds:  Diabetic Ulcer, Multiple       Current Nutrition Therapies:    Diet NPO  ADULT DIET; Regular; 5 carb choices (75 gm/meal); No Added Salt (3-4 gm)  Adult Oral Nutrition Supplement;  Wound Healing Oral Supplement    Anthropometric Measures:  · Height: 6' (182.9 cm)  · Current Body Weight: 259 lb 14.8 oz (117.9 kg)   · Admission Body Weight:      · Usual Body Weight: 293 lb (132.9 kg) (12/17/21)     · Ideal Body Weight: 178 lbs; % Ideal Body Weight 146 %   · BMI: 35.2  · Adjusted Body Weight:  ; Amputation, No Adjustment (toes)   · Adjusted BMI:      · BMI Categories: Obese Class 2 (BMI 35.0 -39.9)       Nutrition Diagnosis:   · Moderate malnutrition, In context of chronic illness related to catabolic illness as evidenced by poor intake prior to admission, weight loss greater than or equal to 10% in 6 months, mild loss of subcutaneous fat    · Inadequate oral intake related to increase demand for energy/nutrients as evidenced by wounds    Nutrition Interventions:   Food and/or Nutrient Delivery:  Modify Current Diet, Start Oral Nutrition Supplement  Nutrition Education/Counseling:  Education initiated (A1c ~7, good control)   Coordination of Nutrition Care:  Continue to monitor while inpatient, Coordination of Community Care    Goals:  Pt will consume at least 50% of meals and supplements       Nutrition Monitoring and Evaluation:   Behavioral-Environmental Outcomes:  None Identified   Food/Nutrient Intake Outcomes:  Food and Nutrient Intake, Supplement Intake  Physical Signs/Symptoms Outcomes:  Biochemical Data, GI Status, Skin, Weight     Discharge Planning:     Too soon to determine     Electronically signed by Connie Matt RD, LD on 6/8/21 at 4:29 PM EDT    Contact: [phone]

## 2021-06-08 NOTE — PLAN OF CARE
Nutrition Problem #1: Moderate malnutrition, In context of chronic illness  Intervention: Food and/or Nutrient Delivery: Modify Current Diet, Start Oral Nutrition Supplement  Nutritional Goals: Pt will consume at least 50% of meals and supplements

## 2021-06-08 NOTE — H&P
History and Physical      Name:  Michelle Phillips /Age/Sex: 1950  (79 y.o. male)   MRN & CSN:  [id number] & [id number] Admission Date/Time: 2021  7:58 PM   [facility]  [facility] PCP: Arley Molina Day: 2    Assessment and Plan:   Michelle Phillips is a 79 y.o.  male  who presents with Diabetic foot ulcer with osteomyelitis ([facility] [address]    1. Diabetic foot ulcer with h/o diabetic peripheral neuropathy  2. Left first stump osteomyelitis, likely  3. Myiasis of left first stump  -Admit to inpatient services with telemetry  -Check ESR, CRP, Blood cultures, and wound cultures  -MRI held as patient will likely get biopsy by general surgery  -General surgery consulted  -US extremity for DVT shows no evidence of DVT in the lower extremities above the knee  -Continue meropenem and vancomycin from ED  -Tight blood sugar control      4. Chest pain, rule out acute coronary syndrome, with h/o coronary artery disease  -No troponin was completed in ED, cycle troponins x3  -Holding aspirin, Plavix in anticipation for surgery. Continue atorvastatin and Coreg  -SL nitro prn  -Nasal cannula oxygen prn  -NPO  -Consulted cardiology, appreciate recommendations and preoperative risk assessment secondary      5. Stage 3B chronic kidney disease (baseline sCr ~1.4)   -Baseline Cr ~1.4; Cr 1.4 in ED; follow on labs  -Baseline GFR ~50; GFR 50 in ED; follow on labs  -Follow BUN/sCr, electrolytes, intake/output  -Consulted nephrology in anticipation for possible surgery    6. AOCD  -Hgb 10.9 in ED, baseline hgb ~14 g/dL per EMR  -MCV 91.8/ MCHC 31.4  -Monitor hemoglobin and transfuse as needed to keep hgb > 7 g/dL  -Hold chemical DVT prophylaxis  -Check Fe, Ferritin, TIBC levels, reticulocyte count, vitamin B12, folate levels  -Does not appear to be actively bleeding  -Check stool for occult blood    7.  IDDM 2  -N.p.o. diet, low n.p.o. SSI + BG checks ACHS, hypoglycemic protocol, and target -180    Other chronic medical conditions:   Continue all home meds except stated above or contraindicated.  COPD: Currently without exacerbation   HLD   BPH   Diabetic peripheral neuropathy   Obesity   Lower extremity edema    Diet Diet NPO   DVT Prophylaxis [] Lovenox, []  Heparin, [x] SCDs, [] Ambulation   GI Prophylaxis [] PPI,  [] H2 Blocker,  [] Carafate,  [] Diet/Tube Feeds   Code Status Full Code   Disposition Patient requires continued admission due to Diabetic foot ulcer with osteomyelitis ([facility] [address]   MDM [] Low, [] Moderate,[x]  High  Patient's risk as above due to acuity of condition with potential for decompensation. History of Present Illness:     Chief Complaint: Diabetic foot ulcer with osteomyelitis ([facility] [address]    Woo Bingham is a 79 y.o.  male with a reviewed and noncontributory family history and a PMH as stated above, who presents with complaints increased pain in his left foot. Patient noticed 1 week of increasing pain and states he was going to come to the ER but kept putting it off. Today he woke up and could not take it anymore. He noticed blood on his sock and on the floor and then when he took off his sock he noticed maggots within this wound. States he has poor wound healing and had surgery on his left toe stump last December. Patient has not been going to wound care. States he just tries to keep his socks dry. Patient has noticed chills but denies fevers. Patient does have an additional complaint of chest pain. States onset was 1 day ago, with resolved course since that time. The patient describes the pain as intermittent, sharp in nature, does not radiate. Patient rates pain as a 7/10 in intensity. Associated symptoms are exertional chest pressure/discomfort and nausea. Aggravating factors are exercise and walking.   Alleviating factors are: rest. Patient's cardiac risk factors are advanced age (older than 54 for men, 72 for women), diabetes mellitus, dyslipidemia, hypertension, male gender, obesity (BMI >= 30 kg/m2), sedentary lifestyle and smoking/ tobacco exposure. Otherwise this is a pleasant gentleman but denies headache, shortness of breath, cough, abdominal pain, vomiting, diarrhea, dark tarry stools, blood per rectum, dysuria, frequency, or urgency. Discussed case with ED provider. ROS:   Review of Systems   Constitutional: Positive for fatigue. Negative for appetite change, diaphoresis and fever. HENT: Negative for congestion, rhinorrhea and sore throat. Eyes: Negative for visual disturbance. Respiratory: Negative for cough, chest tightness, shortness of breath and wheezing. Cardiovascular: Positive for chest pain. Negative for palpitations and leg swelling. Gastrointestinal: Positive for nausea. Negative for abdominal pain, anal bleeding, blood in stool, constipation, diarrhea and vomiting. Genitourinary: Negative for dysuria, frequency, hematuria and urgency. Musculoskeletal: Positive for arthralgias and gait problem. Skin: Positive for wound (Multiple lower extremity wounds. Worse being left for stump). Negative for color change and rash. Neurological: Negative for dizziness, tremors, seizures, syncope, speech difficulty, weakness, numbness and headaches. Psychiatric/Behavioral: Negative for confusion. Objective:   No intake or output data in the 24 hours ending 06/08/21 0558   Vitals:   Vitals:    06/08/21 0506   BP: (!) 176/87   Pulse: 65   Resp: 16   Temp: 98.3 °F (36.8 °C)   SpO2: 92%     BP (!) 176/87   Pulse 65   Temp 98.3 °F (36.8 °C) (Oral)   Resp 16   Ht 6' (1.829 m)   Wt 260 lb (117.9 kg)   SpO2 92%   BMI 35.26 kg/m²   Physical Exam:   Physical Exam  Vitals and nursing note reviewed. Constitutional:       General: He is awake. He is not in acute distress. Appearance: Normal appearance. He is morbidly obese. He is not ill-appearing, toxic-appearing or diaphoretic. Comments: Appears in pain   HENT:      Head: Atraumatic.       Right Ear: slurred. Behavior: Behavior is cooperative. Past Medical History:      Past Medical History:   Diagnosis Date    Acid reflux     Acute MI ([address] 2004, 2008    Arthritis     Back    Broken teeth     Upper Front    CAD (coronary artery disease)     Sees Dr. Sia Calderon [facility])     per old chart    CHF (congestive heart failure) ([facility] [address]     Chronic back pain     Chronic kidney disease     STAGE 3 KIDNEY FAILURE- \"from my diabetes- do not follow with any one- have seen Dr Feng Albrecht in the past\"    Diabetes mellitus ([facility] [address] Dx 1965    per old chart pt has been diabetic since age 13    Diabetic neuropathy ([facility] [address]     \"in my feet\"    H/O cardiovascular stress test 08/25/2016    H/O Doppler ultrasound 09/27/2018    Moderate disease of the right lower extremity with an JALEN of 0.72. Moderate to severe disease of the left lower extremity with an JALEN of 0.55.   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    Neuropathy     both feet    neuropathy     PAD (peripheral artery disease) ([address] 09/27/2018    PVD (peripheral vascular disease) ([facility] [address]     Sick sinus syndrome ([facility] [address]     Sleep apnea     \"sleep study 3 yrs ago- could not tolerate the cpap made me too dry\"    Spinal stenosis     Teeth missing     Upper And Lower    Type 2 diabetes mellitus without complication ([address]      PSHX:  has a past surgical history that includes Coronary angioplasty with stent; Dental surgery; Colonoscopy (08/04/2016); pacemaker placement (06/04/2010); vascular surgery; colectomy (Right, 08/26/2016);  Toe amputation (Right, 09/12/2017); Toe amputation (Right, 01/09/2018); Cardiac catheterization; Cardiac defibrillator placement (06/04/2010); Coronary angioplasty; Cardiac catheterization (07/14/2017); Cardiac catheterization (11/20/2018); and Toe amputation (Left, 12/26/2020). Allergies: Allergies   Allergen Reactions    Pcn [Penicillins] Hives    Fentanyl Itching       FAM HX: family history includes Cancer in his brother, father, and son; Diabetes in his brother, mother, and sister; Early Death (age of onset: 62) in his sister; Heart Disease in his brother and sister; High Blood Pressure in his brother, brother, and mother; Neuropathy in his sister; Other in his sister; Stroke in his mother; Vision Loss in his mother. Soc HX:   Social History     Socioeconomic History    Marital status:      Spouse name: Not on file    Number of children: Not on file    Years of education: Not on file    Highest education level: Not on file   Occupational History    Not on file   Tobacco Use    Smoking status: Current Some Day Smoker     Packs/day: 0.10     Years: 36.00     Pack years: 3.60     Types: Cigars     Start date: 1/1/1980    Smokeless tobacco: Never Used   Vaping Use    Vaping Use: Never used   Substance and Sexual Activity    Alcohol use: No    Drug use: Yes     Types: Marijuana    Sexual activity: Never   Other Topics Concern    Not on file   Social History Narrative    Not on file     Social Determinants of Health     Financial Resource Strain:     Difficulty of Paying Living Expenses:    Food Insecurity:     Worried About Running Out of Food in the Last Year:     [address] in the Last Year:    Transportation Needs:     Lack of Transportation (Medical):      Lack of Transportation (Non-Medical):    Physical Activity:     Days of Exercise per Week:     Minutes of Exercise per Session:    Stress:     Feeling of Stress :    Social Connections:     Frequency of Communication with Friends and Family:     Frequency of Social Gatherings with Friends and Family:     Attends [employer] Services:     Active Member of Clubs or Organizations:     Attends Club or Organization Meetings:     Marital Status:    Intimate Partner Violence:     Fear of Current or Ex-Partner:     Emotionally Abused:     Physically Abused:     Sexually Abused:        Data:   CBC with Differential:    Lab Results   Component Value Date    WBC 7.1 06/07/2021    RBC 3.78 06/07/2021    HGB 10.9 06/07/2021    HCT 34.7 06/07/2021     06/07/2021    MCV 91.8 06/07/2021    MCH 28.8 06/07/2021    MCHC 31.4 06/07/2021    RDW 14.6 06/07/2021    SEGSPCT 80.5 06/07/2021    LYMPHOPCT 13.4 06/07/2021    MONOPCT 3.8 06/07/2021    EOSPCT 1.5 09/08/2011    BASOPCT 0.4 06/07/2021    MONOSABS 0.3 06/07/2021    LYMPHSABS 1.0 06/07/2021    EOSABS 0.1 06/07/2021    BASOSABS 0.0 06/07/2021    DIFFTYPE AUTOMATED DIFFERENTIAL 06/07/2021       CMP:     Lab Results   Component Value Date     06/07/2021    K 4.2 06/07/2021    K 5.1 01/10/2018     06/07/2021    CO2 28 06/07/2021    BUN 9 06/07/2021    CREATININE 1.4 06/07/2021    GFRAA >60 06/07/2021    LABGLOM 50 06/07/2021    GLUCOSE 213 06/07/2021    PROT 7.0 06/07/2021    PROT 6.3 01/21/2013    LABALBU 3.4 06/07/2021    CALCIUM 8.4 06/07/2021    BILITOT 0.6 06/07/2021    ALKPHOS 73 06/07/2021    AST 8 06/07/2021    ALT 6 06/07/2021       Troponin:  Lab Results   Component Value Date    TROPONINT <0.010 03/11/2021     Radiology results:  VL DUP LOWER EXTREMITY VENOUS BILATERAL   Final Result   No evidence of DVT in either lower extremity above the knee. Deep veins   below the knee are not visualized due to diffuse subcutaneous edema. XR FOOT RIGHT (MIN 3 VIEWS)   Final Result   1. No acute periostitis or bony destruction. XR FOOT LEFT (MIN 3 VIEWS)   Final Result   Ulceration involving the stump of the left great toe.   Bony irregularity of   the proximal phalangeal stump, raising the possibility of osteomyelitis. Medications:   Home Medications:   Prior to Admission medications    Medication Sig Start Date End Date Taking?  Authorizing Provider   torsemide (DEMADEX) 100 MG tablet TAKE 1 TABLET BY MOUTH TWICE A DAY 4/13/21   Selvin Reyes MD   ondansetron (ZOFRAN) 4 MG tablet Take 1 tablet by mouth every 8 hours as needed for Nausea or Vomiting 3/11/21   Neva Mancilla MD   doxazosin (CARDURA) 4 MG tablet TAKE 1 TABLET BY MOUTH TWICE A DAY 2/22/21   Selvin Reyes MD   acetaminophen (TYLENOL) 500 MG tablet Take 2 tablets by mouth 3 times daily as needed for Pain 1/6/21   Abel Wills MD   pregabalin (LYRICA) 150 MG capsule TAKE 1 CAPSULE BY MOUTH THREE TIMES A DAY 11/23/20   Historical Provider, MD   doxazosin (CARDURA) 2 MG tablet Take 2 tablets by mouth 2 times daily 12/16/20   Selvin Reyes MD   insulin lispro (HUMALOG) 100 UNIT/ML injection vial Inject 0-6 Units into the skin 3 times daily (with meals) 12/13/20   Abhilash Coombs MD   atorvastatin (LIPITOR) 40 MG tablet Take 1 tablet by mouth nightly 12/13/20   Abhilash Coombs MD   carvedilol (COREG) 3.125 MG tablet Take 1 tablet by mouth 2 times daily 12/13/20   Abhilash Coombs MD   dicyclomine (BENTYL) 10 MG capsule Take 1 capsule by mouth 4 times daily (before meals and nightly) 12/13/20   Abhilash Coombs MD   albuterol sulfate HFA (VENTOLIN HFA) 108 (90 Base) MCG/ACT inhaler Inhale 2 puffs into the lungs every 4 hours as needed for Wheezing 8/14/20   Yair Mayer MD   Calcium Alginate (ALGICELL CALCIUM DRESSING 4\"X8) MISC Apply 1 Device topically daily 3/20/20   Larissa Joyner MD   [facility] COREY WOUND DRESSING MATRIX Apply topically Apply to left daily and cover with gauze 3/19/20   Larissa Joyner MD   clopidogrel (PLAVIX) 75 MG tablet Take 1 tablet by mouth daily 7/18/17   Aria Mills MD     Medications:    vancomycin  1,500 mg Intravenous Q24H    meropenem  1,000 mg Intravenous Q8H    atorvastatin  40 mg Oral Nightly    carvedilol  3.125 mg Oral BID    [Held by provider] clopidogrel  75 mg Oral Daily    dicyclomine  10 mg Oral 4x Daily AC & HS    doxazosin  4 mg Oral BID    pregabalin  150 mg Oral TID    torsemide  100 mg Oral BID    sodium chloride flush  5-40 mL Intravenous BID    insulin lispro  0-12 Units Subcutaneous Q4H      Infusions:    sodium chloride      dextrose       PRN Meds: hydrALAZINE (APRESOLINE) ivpb, 10 mg, Q6H PRN  albuterol sulfate HFA, 2 puff, Q4H PRN  sodium chloride flush, 5-40 mL, PRN  sodium chloride, 25 mL, PRN  ondansetron, 4 mg, Q8H PRN   Or  ondansetron, 4 mg, Q6H PRN  polyethylene glycol, 17 g, Daily PRN  acetaminophen, 650 mg, Q6H PRN   Or  acetaminophen, 650 mg, Q6H PRN  glucose, 15 g, PRN  dextrose, 12.5 g, PRN  glucagon (rDNA), 1 mg, PRN  dextrose, 100 mL/hr, PRN  morphine, 2 mg, Q2H PRN   Or  morphine, 4 mg, Q2H PRN        Electronically signed by Dena Pollard DO on 6/8/2021 at 5:58 AM      This dictation was created with voice recognition software. While attempts have been made to review the dictation as it is transcribed, on occasion the spoken word can be misinterpreted by the technology leading to omissions or inappropriate words, phrases or sentences.

## 2021-06-08 NOTE — CONSULTS
amputation (Left, 12/26/2020). Social History:   reports that he has been smoking cigars. He started smoking about 41 years ago. He has a 3.60 pack-year smoking history. He has never used smokeless tobacco. He reports current drug use. Drug: Marijuana. He reports that he does not drink alcohol. Family history:  family history includes Cancer in his brother, father, and son; Diabetes in his brother, mother, and sister; Early Death (age of onset: 62) in his sister; Heart Disease in his brother and sister; High Blood Pressure in his brother, brother, and mother; Neuropathy in his sister; Other in his sister; Stroke in his mother; Vision Loss in his mother.     Allergies   Allergen Reactions    Pcn [Penicillins] Hives    Fentanyl Itching       vancomycin (VANCOCIN) 1,500 mg in dextrose 5 % 500 mL IVPB, Q24H  hydrALAZINE (APRESOLINE) 10 mg in sodium chloride 0.9 % 50 mL ivpb, Q6H PRN  meropenem (MERREM) 1,000 mg in sodium chloride 0.9 % 100 mL IVPB (mini-bag), Q8H  albuterol sulfate  (90 Base) MCG/ACT inhaler 2 puff, Q4H PRN  atorvastatin (LIPITOR) tablet 40 mg, Nightly  carvedilol (COREG) tablet 3.125 mg, BID  [Held by provider] clopidogrel (PLAVIX) tablet 75 mg, Daily  dicyclomine (BENTYL) capsule 10 mg, 4x Daily AC & HS  doxazosin (CARDURA) tablet 4 mg, BID  pregabalin (LYRICA) capsule 150 mg, TID  torsemide (DEMADEX) tablet 100 mg, BID  sodium chloride flush 0.9 % injection 5-40 mL, BID  sodium chloride flush 0.9 % injection 5-40 mL, PRN  0.9 % sodium chloride infusion, PRN  ondansetron (ZOFRAN-ODT) disintegrating tablet 4 mg, Q8H PRN   Or  ondansetron (ZOFRAN) injection 4 mg, Q6H PRN  polyethylene glycol (GLYCOLAX) packet 17 g, Daily PRN  acetaminophen (TYLENOL) tablet 650 mg, Q6H PRN   Or  acetaminophen (TYLENOL) suppository 650 mg, Q6H PRN  glucose (GLUTOSE) 40 % oral gel 15 g, PRN  dextrose 50 % IV solution, PRN  glucagon (rDNA) injection 1 mg, PRN  dextrose 5 % solution, PRN  insulin lispro (HUMALOG) injection vial 0-12 Units, Q4H  morphine sulfate (PF) injection 2 mg, Q2H PRN   Or  morphine sulfate (PF) injection 4 mg, Q2H PRN      Current Facility-Administered Medications   Medication Dose Route Frequency Provider Last Rate Last Admin    vancomycin (VANCOCIN) 1,500 mg in dextrose 5 % 500 mL IVPB  1,500 mg Intravenous Q24H Shy Ahr, DO        hydrALAZINE (APRESOLINE) 10 mg in sodium chloride 0.9 % 50 mL ivpb  10 mg Intravenous Q6H PRN Magalys Dapilah, APRN - CNP        meropenem (MERREM) 1,000 mg in sodium chloride 0.9 % 100 mL IVPB (mini-bag)  1,000 mg Intravenous Q8H Gumaro Bowers DO   Stopped at 06/08/21 0106    albuterol sulfate  (90 Base) MCG/ACT inhaler 2 puff  2 puff Inhalation Q4H PRN Shy Ahr, DO        atorvastatin (LIPITOR) tablet 40 mg  40 mg Oral Nightly Gumaro Bowers DO        carvedilol (COREG) tablet 3.125 mg  3.125 mg Oral BID Shy Ahr, DO   3.125 mg at 06/08/21 0037    [Held by provider] clopidogrel (PLAVIX) tablet 75 mg  75 mg Oral Daily Shy Ahr, DO        dicyclomine (BENTYL) capsule 10 mg  10 mg Oral 4x Daily AC & HS Gumaro Bowers DO        doxazosin (CARDURA) tablet 4 mg  4 mg Oral BID Shy Ahr, DO        pregabalin (LYRICA) capsule 150 mg  150 mg Oral TID Shy Ahr, DO        torsemide [facility]) tablet 100 mg  100 mg Oral BID Shy Ahr, DO        sodium chloride flush 0.9 % injection 5-40 mL  5-40 mL Intravenous BID Shy Ahr, DO        sodium chloride flush 0.9 % injection 5-40 mL  5-40 mL Intravenous PRN Gumaro DO Robinson        0.9 % sodium chloride infusion  25 mL Intravenous PRN Shy Ahr, DO        ondansetron (ZOFRAN-ODT) disintegrating tablet 4 mg  4 mg Oral Q8H PRN Shy Ahr, DO        Or    ondansetron (ZOFRAN) injection 4 mg  4 mg Intravenous Q6H PRN Shy Ahr, DO        polyethylene glycol (GLYCOLAX) packet 17 g  17 g Oral Daily PRN Shy Ahr, DO        acetaminophen (TYLENOL) tablet 650 mg  650 mg Oral Q6H PRN Randolph Flirt, DO        Or    acetaminophen (TYLENOL) suppository 650 mg  650 mg Rectal Q6H PRN Randolph Flirt, DO        glucose (GLUTOSE) 40 % oral gel 15 g  15 g Oral PRN Alpha Flirt, DO        dextrose 50 % IV solution  12.5 g Intravenous PRN Alpha Flirt, DO        glucagon (rDNA) injection 1 mg  1 mg Intramuscular PRN Randolph Flirt, DO        dextrose 5 % solution  100 mL/hr Intravenous PRN Alpha Flirt, DO        insulin lispro (HUMALOG) injection vial 0-12 Units  0-12 Units Subcutaneous Q4H Randolph Flirt, DO        morphine sulfate (PF) injection 2 mg  2 mg Intravenous Q2H PRN Randolph Flirt, DO        Or    morphine sulfate (PF) injection 4 mg  4 mg Intravenous Q2H PRN Alpha Flirt, DO   4 mg at 06/08/21 0344     Review of Systems:  All 14 systems reviewed, all negative except for  Le swelling    Physical Examination:    BP (!) 176/87   Pulse 65   Temp 98.3 °F (36.8 °C) (Oral)   Resp 16   Ht 6' (1.829 m)   Wt 260 lb (117.9 kg)   SpO2 92%   BMI 35.26 kg/m²      Wt Readings from Last 3 Encounters:   06/07/21 260 lb (117.9 kg)   01/06/21 259 lb (117.5 kg)   12/27/20 281 lb 14.4 oz (127.9 kg)     Body mass index is 35.26 kg/m². General Appearance:  fair  Head: normocephalic     Eyes: normal, noninjected conjunctiva    ENT: normal mucosa, noninjected throat, normal     NECK: No JVP  No thyromegaly        Cardiovascular: No thrills palpated   Auscultation: Normal S1 and S2,  no murmur   carotid bruit no   Abdominal Aorta no bruit    Respiratory:    Breath sounds Clear = 0    Extremities:  none Edema clubbing ,   no cyanosis    SKIN: Warm and well perfused, no pallor or cyanosis    Vascular exam:  Pedal Pulses: diminished  bilaterally        Abdomen:  No masses or tenderness. No organomegaly noted. Neurological:  Oriented to time, place, and person   No focal neurological deficit noted.   Psychiatric:normal mood, no anxiety    Lab Review Recent Labs     06/07/21 1939   WBC 7.1   HGB 10.9*   HCT 34.7*         Recent Labs     06/07/21 1939      K 4.2      CO2 28   BUN 9   CREATININE 1.4*     Recent Labs     06/07/21 1939   AST 8*   ALT 6*   BILITOT 0.6   ALKPHOS 73     No results for input(s): TROPONINI in the last 72 hours. Lab Results   Component Value Date    BNP 67 03/27/2014    BNP 20 08/14/2013    BNP 29 01/21/2013     Lab Results   Component Value Date    INR 1.03 11/19/2018    PROTIME 11.7 11/19/2018           Assessment/Recommendations:     - CAD  Stable has no CP  - ICD recently interrogated  - HFrEF  Stable  - PVD  ?  Amputation    MODERATE CARDIAC RISK         Humberto Bernal MD, 6/8/2021 5:44 AM

## 2021-06-08 NOTE — CARE COORDINATION
LSW reviewed chart/into room to initiate discharge planning. LSW introduced self and role of LSW. While LSW was explaining the role of LSW Pt phone rang. Pt stated that it was his brother and he was waiting on the phone call. Pt requested that LSW return at a later time. LSW returned to the Pt room. Pt was resting in the bed. LSW knocked on the door and entered the room. Pt appeared to not remember who the LSW was or the conversation that was had between the LSW and the Pt an hour before. LSW reintroduced self and role of LSW. Pt is from home with his dgt and grandchildren. Pt does not drive. Pt dgt provides all the transport for the Pt. Pt has PCP. Pt reported that he has all the DME that he needs. Per the previous CM notes Pt has an adjustable bed, wheelchair, olivares around, [facility],, shower chair, life alert and daily delivered meals. Pt reported that he had home care but the RN did nothing but ask him the same questions over and over. LSW informed the Pt that the LSW will continue to follow the Pt case while he is here in the hospital for discharge needs.

## 2021-06-08 NOTE — ED NOTES
Bed: ED-41  Expected date:   Expected time:   Means of arrival:   Comments:   [facility] [place], IFEOMA  06/07/21 1665

## 2021-06-08 NOTE — CONSULTS
Pt seen ,examined,interviewed and chart reviewed. Please see the dictated consult for details     Imp :   1. CKD stage 3 a/B A3 he was likely off loop and may have lost muscle - all can lower cr level   2. ASCVD with toe gangrene and skin/ soft tissue and bone  infection  3. HTN rather resistant - DM     Plan:  1. Start with ua and urinary indices s  2. 'manual BP and adjust  Get at least 140/80 range   3. As he may need intervention will hold RAAS inh for now   4.  Good BS control'  5.  follow clinically       Thanks for the consult    #[id number]

## 2021-06-08 NOTE — PROGRESS NOTES
foot wound and concern for gangrene. He is not happy that his foot doctor has not come to see him yet. I apologized as I did not know he had an outpatient podiatrist.  He was cool with general surgery seeing him in-house in lieu of podiatry. Denies fevers or chills. He occasionally has night sweats. Ten point ROS reviewed negative, unless as noted above    Objective: Intake/Output Summary (Last 24 hours) at 6/8/2021 1711  Last data filed at 6/8/2021 1012  Gross per 24 hour   Intake --   Output 350 ml   Net -350 ml      Vitals:   Vitals:    06/08/21 1445   BP: 127/60   Pulse: 63   Resp: 16   Temp: 97.7 °F (36.5 °C)   SpO2: 90%     Physical Exam:   GEN Awake male, sitting upright in bed in no apparent distress. Appears given age. EYES Pupils are equally round. No scleral erythema, discharge, or conjunctivitis. HENT Mucous membranes are moist. Oral pharynx without exudates, no evidence of thrush. NECK Supple, no apparent thyromegaly or masses. RESP Clear to auscultation, no wheezes, rales or rhonchi. Symmetric chest movement while on room air. CARDIO/VASC S1/S2 auscultated. RRR. ICD in chest  GI Abdomen is soft without significant tenderness, masses, or guarding. Bowel sounds are normoactive. Rectal exam deferred.  No costovertebral angle tenderness. Normal appearing external genitalia. Fuller catheter is not present. HEME/LYMPH No palpable cervical lymphadenopathy and no hepatosplenomegaly. No petechiae or ecchymoses. MSK Charcot's deformity both ankles, lymphedematous lower extremity, s/p left proximal phalanx amputation, right third and fifth toe amputation  SKIN bilateral lower extremity with atrophic changes, healed ulceration  NEURO Cranial nerves appear grossly intact, normal speech, no lateralizing weakness. PSYCH Awake, alert, oriented x 4. Affect appropriate.     Medications:   Medications:    vancomycin  1,500 mg Intravenous Q24H    amLODIPine  10 mg Oral Daily    meropenem 1,000 mg Intravenous Q8H    atorvastatin  40 mg Oral Nightly    carvedilol  3.125 mg Oral BID    [Held by provider] clopidogrel  75 mg Oral Daily    dicyclomine  10 mg Oral 4x Daily AC & HS    doxazosin  4 mg Oral BID    pregabalin  150 mg Oral TID    torsemide  100 mg Oral BID    sodium chloride flush  5-40 mL Intravenous BID    insulin lispro  0-12 Units Subcutaneous Q4H      Infusions:    sodium chloride      dextrose       PRN Meds: hydrALAZINE (APRESOLINE) ivpb, 10 mg, Q6H PRN  albuterol sulfate HFA, 2 puff, Q4H PRN  sodium chloride flush, 5-40 mL, PRN  sodium chloride, 25 mL, PRN  ondansetron, 4 mg, Q8H PRN   Or  ondansetron, 4 mg, Q6H PRN  polyethylene glycol, 17 g, Daily PRN  acetaminophen, 650 mg, Q6H PRN   Or  acetaminophen, 650 mg, Q6H PRN  glucose, 15 g, PRN  dextrose, 12.5 g, PRN  glucagon (rDNA), 1 mg, PRN  dextrose, 100 mL/hr, PRN  morphine, 2 mg, Q2H PRN   Or  morphine, 4 mg, Q2H PRN          Electronically signed by No Gil MD on 6/8/2021 at 5:11 PM

## 2021-06-08 NOTE — CONSULTS
[address], [address] [facility]                                  CONSULTATION    PATIENT NAME: Corbin Moreland                       :        1950  MED REC NO:   [id number]                          ROOM:       [id number]  ACCOUNT NO:   [de-identified]                           ADMIT DATE: 2021  PROVIDER:     Ugo Talbert MD    CONSULT DATE:  2021    CONSULT REQUESTED BY:  Ramin Parisi DO    REASON FOR CONSULTATION:  Hypertension and CKD stage IIIA/B.    BRIEF HISTORY:  The patient is 75-year-old unfortunate male with  multiple medical conditions. He was brought to the emergency room with  leg pain, erythema, swelling, and unable to take care of his leg at  home. He was brought in the emergency room by squad. In the emergency  room, his blood pressure was elevated which is not uncommon for him. It  was running about 180-190/80s range, and he underwent several diagnostic  tests which include imaging and biochemical imaging study, a several of  them. Ultrasound was negative for DVT. X-ray of the foot, no acute periostitis or bony destruction and the left  side showed ulceration involving the stem of the left great toe, bony  irregularity of the proximal phalangeal stem and there is suspicion for  osteomyelitis. Biochemical testing showed acceptable electrolytes. Creatinine was  actually low at 1.4. He must have lost some muscle and sugar was 213  and hemoglobin 10.3. He was subsequently admitted for further  evaluation. This morning when I saw him, he is alert, awake, and oriented. Apparently, he had maggots in his leg, but all had been cleaned up, he  was little embarrassed about all these things and was little depressed,  but I reassured him that we will take care of him whatever it takes. I am of course very familiar with him. I have been seeing him since   or so.   His creatinine was running about 1.1 to 1.2, but it does  increase and fluctuate due to diuretics. He does have underlying  atherosclerotic cardiovascular heart disease and diabetic kidney disease  with significant proteinuria. My last encounter with him was in 12/2020  when he was admitted here and underwent angiogram of lower extremity,  but it could not be open completely, and he also underwent the toe  amputation. I have not had a chance to see him since then. Obviously  it is difficult for him to come see me for various social reasons. At  that time, the creatinine was about 1.9 or so. I am unsure whether he  was taking his high-dose loop therapy, or may or may not, but it looks  like he lost a lot of muscle mass, and he does not have much peripheral  edema to my surprise. PAST MEDICAL HISTORY:  1.  CKD stage IIIA/B A3 with several acute kidney injuries, mainly by  creatinine criteria of course. 2.  Cardiorenal syndrome type 2 frequently transforming to type 1, but  has not been doing it since 12/2020. His diabetes mellitus is  longstanding, it was not very well controlled, had significant  proteinuria. 3.  Hypertension, also was not very well controlled. 4.  Coronary artery disease. He has had several stents long time ago by  Dr. Saúl Vang. He also had an ICD placement. His EF was about 45% or so  unless there is any recent echocardiogram done. 5.  Severe atherosclerotic cardiovascular disease involving several  vascular beds, mainly the lower extremity too. He had angiogram  intervention and toe amputation as I mentioned earlier. 6.  Chronic leg edema, which is pretty much minimal now. 7.  Probable autonomic dysfunction. He did have some orthostatic  hypotension before, although it could be multifactorial.    PAST SURGICAL HISTORY:  1. 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 2.  Angiogram several of them. 3.  Pacemaker and ICD placement.   4.  Coronary angiogram too, had intervention in the past.    FAMILY HISTORY: Coronary artery disease runs in the family. Nobody has  advanced kidney disease or is on dialysis. SOCIAL HISTORY:  The patient is . He was born in [facility],  where he moved to [place] for a while and back here since, I  think, in 2016. He does have some extended family. He lives alone at  home. He does probably have some home healthcare. He does have an  electrical scooter. I am assuming that he has some social support, but  may be I am wrong, I need to double check with him. HABITS:  He does not smoke. No history of alcohol or illicit drug  abuse. ALLERGIES:  Listed, allergic to PENICILLIN and FENTANYL. REVIEW OF SYSTEMS:  Mainly the leg pain, erythema, he was not able to  take care of his leg. No shortness of breath and no urinary symptoms. Rest of review of systems is negative other than previous paragraph. PHYSICAL EXAMINATION:  GENERAL:  The patient is without any acute distress. He is alert, awake  and oriented. VITAL SIGNS:  At the time of examination is temperature is 98.3, blood  pressure was in 170s/80s, pulse 60, and respiratory rate 16. HEENT:  Head and Neck:  Normocephalic and atraumatic. Eyes:  1+  conjunctival pallor. CARDIOVASCULAR:  Seems regular rate and rhythm. I  can see the left chest wall cardiac device which is nontender. RESPIRATORY:  Little bit of rhonchi expiratory. ABDOMEN:  Soft. EXTREMITIES:  Minimal edema. The wound is wrapped up bilaterally. Looks like the wound has been clean too, probably I would say 1 to 2+  edema. LABORATORY VALUES AND ANCILLARY SERVICES:  As mentioned earlier. IMPRESSION:  A 75-year-old male with CKD stage III, comes with skin,  soft tissue and bone infection. 1.  CKD stage IIIA/B A3. He was likely off loop or maybe he lost a lot  of muscle mass hence the creatinine is little bit low. 2.  Atherosclerotic cardiovascular disease, toe gangrene and skin, soft  tissue and bone infection.   3.  Hypertension rather resistant, diabetes, etc.    PLAN:  1. Start with UA and urine indices. 2.  Manual blood pressure and adjust blood pressure, at least get it to  140/80 manually though. I would probably avoid  renin-angiotensin-aldosterone inhibitor for now since he will probably  need some intervention, good blood sugar, blood pressure control. 3.  Follow clinically.         Kiley Marques MD    D: 06/08/2021 7:18:07       T: 06/08/2021 7:23:17     [id number]  Job#: [id number]     Doc#: [id number]    CC:

## 2021-06-08 NOTE — PROGRESS NOTES
There is a podiatry consult, no podiatrist on call at this time. There is currently no one on call. Perfect serve sent to Crow Dela Cruz MD \"Attempted to call this consult however perfect serve states that there is no one on call for podiatry at this time. Wanted to make sure someone was aware. Thanks. \" Ileana Doshi RN aware as well --- Gretchen Andujar. [facility] 6/8/21 9:42 AM     Spoke with Dr. Geovanna Shearer consult forwarded to general surgery. Dr. Sushma No notified of consult and will see pt. --Gretchen Andujar.  [facility] 6/8/21 @1109

## 2021-06-08 NOTE — CONSULTS
Department of General Surgery   Surgical Service Dr. Ren Welch   Consult Note    Date of Consult: 6/8/21    Reason for Consult:  Bilateral foot wounds  Requesting Physician:  Dr. Yahaira Simmons:  Bilateral foot wounds, maggots in left great toe    History Obtained From:  patient    HISTORY OF PRESENT ILLNESS:    Pavel Devi was seen earlier today around noon, this is a late note entry. The patient is a 79 y.o. male who presented with chest pain and bilateral foot wounds, left worse than right. He reports chronic wound to his left great toe. Has had a previous partial great toe resection by me and had seen Dr. Cheyanne Betancourt in the past as well. Pavel Devi denies significant pain related to his wounds. He has not had fevers or chills. He does report that when getting into the shower yesterday he noticed a significant number of maggots fall out of the wound and this prompted his presentation. He initially had chest pain upon presentation but this has resolved. Pavel Devi has been evaluated by Vascular surgery in the past and underwent angiogram 6 months ago by Dr. Karla Small but failed to keep follow up appointment. Past Medical History:    Past Medical History:   Diagnosis Date    Acid reflux     Acute MI ([address] 2004, 2008    Arthritis     Back    Broken teeth     Upper Front    CAD (coronary artery disease)     Sees Dr. Cole [facility])     per old chart    CHF (congestive heart failure) ([address]     Chronic back pain     Chronic kidney disease     STAGE 3 KIDNEY FAILURE- \"from my diabetes- do not follow with any one- have seen Dr Zoanne Severs in the past\"    Diabetes mellitus ([address] Dx 1965    per old chart pt has been diabetic since age 13    Diabetic neuropathy ([address]     \"in my feet\"    H/O cardiovascular stress test 08/25/2016    H/O Doppler ultrasound 09/27/2018    Moderate disease of the right lower extremity with an JALEN of 0.72.   Moderate to severe disease of the left lower extremity with an JALEN of 0.55.   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    Neuropathy     both feet    neuropathy     PAD (peripheral artery disease) ([facility] 09/27/2018    PVD (peripheral vascular disease) ([facility]     Sick sinus syndrome ([facility]     Sleep apnea     \"sleep study 3 yrs ago- could not tolerate the cpap made me too dry\"    Spinal stenosis     Teeth missing     Upper And Lower    Type 2 diabetes mellitus without complication ([facility]        Past Surgical History:    Past Surgical History:   Procedure Laterality Date    CARDIAC CATHETERIZATION      per old chart done 10/2014    CARDIAC CATHETERIZATION  07/14/2017    with angiography of leg    CARDIAC CATHETERIZATION  11/20/2018    PATENT STENTS OF ALL THREE MAJOR VESSELS    CARDIAC DEFIBRILLATOR PLACEMENT  06/04/2010    [employer] [address] Defibrillator Implanted    COLECTOMY Right 08/26/2016    laparascopic; robotic assisted    COLONOSCOPY  08/04/2016    CORONARY ANGIOPLASTY      \"15 Heart Stents\"    CORONARY ANGIOPLASTY WITH STENT PLACEMENT      per old chart had angio with stent to circumflex and obtuse marginal artery at [facility] 5/2010( old chart also gives hx of stent placement done 2000,2004 and 2005)    DENTAL SURGERY      Teeth Extracted In Past    PACEMAKER PLACEMENT  06/04/2010    [employer] [address] Defibrillator Implanted    TOE AMPUTATION Right 09/12/2017    Rt 3rd toe    TOE AMPUTATION Right 01/09/2018     Right 5th toe amputation and Toenails trimmed left 2,3,4 and 5th toes    TOE AMPUTATION Left 12/26/2020    LEFT GREAT TOE AMPUTATION performed by Abel Wills MD at  ondansetron (ZOFRAN-ODT) disintegrating tablet 4 mg  4 mg Oral Q8H PRN Hai Mace, DO        Or    ondansetron (ZOFRAN) injection 4 mg  4 mg Intravenous Q6H PRN Hai Mace, DO        polyethylene glycol (GLYCOLAX) packet 17 g  17 g Oral Daily PRN Hai Mace, DO        acetaminophen (TYLENOL) tablet 650 mg  650 mg Oral Q6H PRN Hai Mace, DO        Or    acetaminophen (TYLENOL) suppository 650 mg  650 mg Rectal Q6H PRN Hai Mace, DO        glucose (GLUTOSE) 40 % oral gel 15 g  15 g Oral PRN Haivin Mace, DO        dextrose 50 % IV solution  12.5 g Intravenous PRN Haivin Mace, DO        glucagon (rDNA) injection 1 mg  1 mg Intramuscular PRN Malvin Mace, DO        dextrose 5 % solution  100 mL/hr Intravenous PRN Haivin Mace, DO        insulin lispro (HUMALOG) injection vial 0-12 Units  0-12 Units Subcutaneous Q4H Haivin Mace, DO        morphine sulfate (PF) injection 2 mg  2 mg Intravenous Q2H PRN Hai Mace, DO        Or    morphine sulfate (PF) injection 4 mg  4 mg Intravenous Q2H PRN Malvin Mace, DO   4 mg at 06/08/21 0612       Allergies:  Pcn [penicillins] and Fentanyl    Social History:   Social History     Socioeconomic History    Marital status:       Spouse name: Not on file    Number of children: Not on file    Years of education: Not on file    Highest education level: Not on file   Occupational History    Not on file   Tobacco Use    Smoking status: Current Some Day Smoker     Packs/day: 0.10     Years: 36.00     Pack years: 3.60     Types: Cigars     Start date: 1/1/1980    Smokeless tobacco: Never Used   Vaping Use    Vaping Use: Never used   Substance and Sexual Activity    Alcohol use: No    Drug use: Yes     Types: Marijuana    Sexual activity: Never   Other Topics Concern    Not on file   Social History Narrative    Not on file     Social Determinants of Health     Financial Resource Strain:     Difficulty of Paying Living Expenses:    Food Insecurity:     Worried About Running Out of Food in the Last Year:     [address] in the Last Year:    Transportation Needs:     Lack of Transportation (Medical):  Lack of Transportation (Non-Medical):    Physical Activity:     Days of Exercise per Week:     Minutes of Exercise per Session:    Stress:     Feeling of Stress :    Social Connections:     Frequency of Communication with Friends and Family:     Frequency of Social Gatherings with Friends and Family:     Attends Synagogue Services:     Active Member of Clubs or Organizations:     Attends Club or Organization Meetings:     Marital Status:    Intimate Partner Violence:     Fear of Current or Ex-Partner:     Emotionally Abused:     Physically Abused:     Sexually Abused:        Family History:   Family History   Problem Relation Age of Onset    Diabetes Mother     Stroke Mother     High Blood Pressure Mother     Vision Loss Mother     Cancer Father         Prostate Cancer    Diabetes Sister     Neuropathy Sister     Other Sister         \"Breathing Problems\"    Heart Disease Sister     Early Death Sister 62        Heart Complications    Cancer Brother         \"Stomach Cancer\"    High Blood Pressure Brother     Diabetes Brother     Heart Disease Brother     High Blood Pressure Brother     Cancer Son         \"Testicle Cancer\"       REVIEW OF SYSTEMS:      Constitutional: Negative for chills. Negative for fever. HENT: Negative for congestion. Negative for rhinorrhea. Respiratory: Negative for cough. Negative for shortness of breath. Negative for wheezing. Cardiovascular: chest pain on presentation--improved. Gastrointestinal:  Negative for constipation. Negative for diarrhea. Negative for nausea and vomiting. Genitourinary: Negative for difficulty urinating. Neurological: Negative for dizziness, syncope and numbness. Hematological: Does not bruise/bleed easily.        PHYSICAL EXAM:  Vitals:    06/08/21 0245 06/08/21 0506 06/08/21 0845 06/08/21 0909   BP: (!) 180/74 (!) 176/87 (!) 187/77    Pulse: 60 65 60    Resp: 18 16 16 16   Temp: 98.4 °F (36.9 °C) 98.3 °F (36.8 °C) 98 °F (36.7 °C)    TempSrc: Oral Oral Oral    SpO2: (!) 89% 92% 96% 95%   Weight:       Height:           Physical Exam  General: awake, alert, in no acute distress  HEENT: mucous membranes moist  Respiratory: normal effort, no wheezes appreciated  CV: appears well perfused, regular rate and rhythm  Abdomen: Soft, non-tender, non-distended. No guarding or rebound tenderness. Skin: warm and dry  Extremities: left great toe with open ulcer, mild necrotic tissue present, ulcer is deep at the distal aspect of the toe, no bone palpable in the wound, left 2nd toe with large ulcer over the extensor surface with tendon exposed, right great toe with smaller ulcer with overlying scab/eschar. Mild edema in the left foot, no erythema, crepitance or purulence  Neuro: decreased sensation present in bilateral feet  Psych: mood normal        DATA:    Lab Results   Component Value Date    WBC 5.4 06/08/2021    HGB 10.0 (L) 06/08/2021    HCT 31.9 (L) 06/08/2021    MCV 91.1 06/08/2021     06/08/2021     Lab Results   Component Value Date     06/08/2021    K 4.5 06/08/2021    K 5.1 01/10/2018     06/08/2021    CO2 25 06/08/2021    BUN 8 06/08/2021    CREATININE 1.4 06/08/2021    GLUCOSE 141 06/08/2021    CALCIUM 8.2 06/08/2021      Arterial duplex US pending    IMPRESSION:    79 y.o. male with DM, CKD and PAD who presents with bilateral foot wounds, left 1st and 2nd toe are the worst with maggots present in the wound on admission. The wounds certainly have an aspect of infection with some necrotic tissue but there is not erythema and only mild edema extending onto the foot. No systemic manifestations of infection.       Will proceed with vascular workup prior to debridement as this will give Rodena Plate a better chance of healing his wounds and limb salvage.     Patient Active Problem List:     Chronic coronary artery disease     Automatic implantable cardioverter-defibrillator in situ     Chronic combined systolic and diastolic heart failure ([facility])     Chronic kidney disease, stage III (moderate) ([facility])     Mixed hyperlipidemia     Sick sinus syndrome ([facility])     Type 2 diabetes mellitus with diabetic polyneuropathy ([facility]     Spinal stenosis of lumbar region     Obesity, Class I, BMI 30-34.9     Postoperative hypertension     S/P partial colectomy     Tubulovillous adenoma of colon     Microalbuminuria     PVD (peripheral vascular disease) ([facility])     Limb ischemia     Necrotic toes ([facility])     Toe gangrene ([facility])     Diabetic foot infection ([facility])     Chronic kidney disease (CKD) stage G3a/A2, moderately decreased glomerular filtration rate (GFR) between 45-59 mL/min/1.73 square meter and albuminuria creatinine ratio between  mg/g ([facility])     DM (diabetes mellitus) ([facility]     Edema     ICD (implantable cardioverter-defibrillator) battery depletion     Hyperkalemia     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[facility])     Ischemia of toe     Acute kidney injury ([facility]     Fluid overload     DM (diabetes mellitus) ([facility]     Hypertensive emergency     Precordial pain     Acute chest pain     Unstable angina ([facility])     Chronic kidney disease (CKD) stage G3a/A3, moderately decreased glomerular filtration rate (GFR) between 45-59 mL/min/1.73 square meter and albuminuria creatinine ratio greater than 300 mg/g (HCC)     Cardiomyopathy (HCC)     Hypertensive crisis     Diabetic neuropathy ([facility])     HTN (hypertension)     Epigastric pain     Acute on chronic congestive heart failure (HCC)     Bilateral lower extremity edema     Acute on chronic systolic CHF (congestive heart failure) ([facility])     Diabetic foot ulcer with osteomyelitis ([facility]     Visit for wound check        PLAN:  - will get arterial US of bilateral lower extremities and ask Vascular Surgery to evaluate prior to debridement. He will have a better chance of healing after debridement if arterial supply can be improved  - continue antibiotics, infection seems localized currently without systemic symptoms  - will follow Vascular workup and plan for foot debridements this admission.   May require below knee amputation/s if unable to heal wounds          Electronically signed by Briseyda Vinson MD on 6/8/2021 at 12:26 PM

## 2021-06-09 ENCOUNTER — PROCEDURE VISIT (OUTPATIENT)
Dept: CARDIOLOGY CLINIC | Age: 71
End: 2021-06-09
Payer: MEDICARE

## 2021-06-09 DIAGNOSIS — I49.5 SINUS NODE DYSFUNCTION (HCC): ICD-10-CM

## 2021-06-09 DIAGNOSIS — Z95.810 ICD (IMPLANTABLE CARDIOVERTER-DEFIBRILLATOR), DUAL, IN SITU: Primary | ICD-10-CM

## 2021-06-09 LAB
CREATININE URINE: 58.9 MG/DL (ref 39–259)
DOSE AMOUNT: NORMAL
DOSE TIME: NORMAL
GLUCOSE BLD-MCNC: 115 MG/DL (ref 70–99)
GLUCOSE BLD-MCNC: 168 MG/DL (ref 70–99)
GLUCOSE BLD-MCNC: 213 MG/DL (ref 70–99)
GLUCOSE BLD-MCNC: 254 MG/DL (ref 70–99)
GLUCOSE BLD-MCNC: 79 MG/DL (ref 70–99)
GLUCOSE BLD-MCNC: 87 MG/DL (ref 70–99)
PROT/CREAT RATIO, UR: 0.9
SODIUM URINE: 97 MMOL/L (ref 35–167)
URINE TOTAL PROTEIN: 52.8 MG/DL
VANCOMYCIN TROUGH: 15.6 UG/ML (ref 10–20)

## 2021-06-09 PROCEDURE — 99232 SBSQ HOSP IP/OBS MODERATE 35: CPT | Performed by: SURGERY

## 2021-06-09 PROCEDURE — 2580000003 HC RX 258: Performed by: STUDENT IN AN ORGANIZED HEALTH CARE EDUCATION/TRAINING PROGRAM

## 2021-06-09 PROCEDURE — 94761 N-INVAS EAR/PLS OXIMETRY MLT: CPT

## 2021-06-09 PROCEDURE — 93295 DEV INTERROG REMOTE 1/2/MLT: CPT | Performed by: INTERNAL MEDICINE

## 2021-06-09 PROCEDURE — 94150 VITAL CAPACITY TEST: CPT

## 2021-06-09 PROCEDURE — 80202 ASSAY OF VANCOMYCIN: CPT

## 2021-06-09 PROCEDURE — 36415 COLL VENOUS BLD VENIPUNCTURE: CPT

## 2021-06-09 PROCEDURE — 82962 GLUCOSE BLOOD TEST: CPT

## 2021-06-09 PROCEDURE — 2700000000 HC OXYGEN THERAPY PER DAY

## 2021-06-09 PROCEDURE — APPSS45 APP SPLIT SHARED TIME 31-45 MINUTES: Performed by: NURSE PRACTITIONER

## 2021-06-09 PROCEDURE — 6370000000 HC RX 637 (ALT 250 FOR IP): Performed by: INTERNAL MEDICINE

## 2021-06-09 PROCEDURE — 6360000002 HC RX W HCPCS: Performed by: STUDENT IN AN ORGANIZED HEALTH CARE EDUCATION/TRAINING PROGRAM

## 2021-06-09 PROCEDURE — 93296 REM INTERROG EVL PM/IDS: CPT | Performed by: INTERNAL MEDICINE

## 2021-06-09 PROCEDURE — 6370000000 HC RX 637 (ALT 250 FOR IP): Performed by: STUDENT IN AN ORGANIZED HEALTH CARE EDUCATION/TRAINING PROGRAM

## 2021-06-09 PROCEDURE — 93297 REM INTERROG DEV EVAL ICPMS: CPT | Performed by: INTERNAL MEDICINE

## 2021-06-09 PROCEDURE — 1200000000 HC SEMI PRIVATE

## 2021-06-09 RX ADMIN — DOXAZOSIN 4 MG: 4 TABLET ORAL at 21:31

## 2021-06-09 RX ADMIN — PREGABALIN 150 MG: 75 CAPSULE ORAL at 21:31

## 2021-06-09 RX ADMIN — MEROPENEM 1000 MG: 1 INJECTION, POWDER, FOR SOLUTION INTRAVENOUS at 16:07

## 2021-06-09 RX ADMIN — MORPHINE SULFATE 4 MG: 4 INJECTION, SOLUTION INTRAMUSCULAR; INTRAVENOUS at 21:31

## 2021-06-09 RX ADMIN — MORPHINE SULFATE 4 MG: 4 INJECTION, SOLUTION INTRAMUSCULAR; INTRAVENOUS at 18:47

## 2021-06-09 RX ADMIN — MEROPENEM 1000 MG: 1 INJECTION, POWDER, FOR SOLUTION INTRAVENOUS at 00:25

## 2021-06-09 RX ADMIN — SODIUM CHLORIDE, PRESERVATIVE FREE 10 ML: 5 INJECTION INTRAVENOUS at 21:32

## 2021-06-09 RX ADMIN — INSULIN LISPRO 2 UNITS: 100 INJECTION, SOLUTION INTRAVENOUS; SUBCUTANEOUS at 18:02

## 2021-06-09 RX ADMIN — VANCOMYCIN HYDROCHLORIDE 1500 MG: 5 INJECTION, POWDER, LYOPHILIZED, FOR SOLUTION INTRAVENOUS at 21:32

## 2021-06-09 RX ADMIN — PREGABALIN 150 MG: 75 CAPSULE ORAL at 11:47

## 2021-06-09 RX ADMIN — CARVEDILOL 3.12 MG: 3.12 TABLET, FILM COATED ORAL at 21:31

## 2021-06-09 RX ADMIN — DICYCLOMINE HYDROCHLORIDE 10 MG: 10 CAPSULE ORAL at 21:31

## 2021-06-09 RX ADMIN — DICYCLOMINE HYDROCHLORIDE 10 MG: 10 CAPSULE ORAL at 11:46

## 2021-06-09 RX ADMIN — PREGABALIN 150 MG: 75 CAPSULE ORAL at 15:45

## 2021-06-09 RX ADMIN — TORSEMIDE 100 MG: 20 TABLET ORAL at 11:48

## 2021-06-09 RX ADMIN — CARVEDILOL 3.12 MG: 3.12 TABLET, FILM COATED ORAL at 11:45

## 2021-06-09 RX ADMIN — DOXAZOSIN 4 MG: 4 TABLET ORAL at 11:46

## 2021-06-09 RX ADMIN — MORPHINE SULFATE 4 MG: 4 INJECTION, SOLUTION INTRAMUSCULAR; INTRAVENOUS at 08:03

## 2021-06-09 RX ADMIN — INSULIN LISPRO 4 UNITS: 100 INJECTION, SOLUTION INTRAVENOUS; SUBCUTANEOUS at 21:33

## 2021-06-09 RX ADMIN — INSULIN LISPRO 6 UNITS: 100 INJECTION, SOLUTION INTRAVENOUS; SUBCUTANEOUS at 00:26

## 2021-06-09 RX ADMIN — PANTOPRAZOLE SODIUM 40 MG: 40 TABLET, DELAYED RELEASE ORAL at 11:48

## 2021-06-09 RX ADMIN — DICYCLOMINE HYDROCHLORIDE 10 MG: 10 CAPSULE ORAL at 18:00

## 2021-06-09 RX ADMIN — ATORVASTATIN CALCIUM 40 MG: 40 TABLET, FILM COATED ORAL at 21:31

## 2021-06-09 RX ADMIN — TORSEMIDE 100 MG: 20 TABLET ORAL at 18:00

## 2021-06-09 RX ADMIN — SODIUM CHLORIDE, PRESERVATIVE FREE 10 ML: 5 INJECTION INTRAVENOUS at 11:47

## 2021-06-09 RX ADMIN — MEROPENEM 1000 MG: 1 INJECTION, POWDER, FOR SOLUTION INTRAVENOUS at 07:52

## 2021-06-09 RX ADMIN — AMLODIPINE BESYLATE 10 MG: 10 TABLET ORAL at 11:46

## 2021-06-09 ASSESSMENT — PAIN DESCRIPTION - PAIN TYPE
TYPE: ACUTE PAIN

## 2021-06-09 ASSESSMENT — PAIN DESCRIPTION - ONSET
ONSET: ON-GOING

## 2021-06-09 ASSESSMENT — PAIN DESCRIPTION - ORIENTATION
ORIENTATION: LEFT
ORIENTATION: LEFT;RIGHT
ORIENTATION: RIGHT;LEFT

## 2021-06-09 ASSESSMENT — PAIN DESCRIPTION - DIRECTION
RADIATING_TOWARDS: DOWN
RADIATING_TOWARDS: ACROSS

## 2021-06-09 ASSESSMENT — PAIN DESCRIPTION - DESCRIPTORS
DESCRIPTORS: THROBBING
DESCRIPTORS: ACHING
DESCRIPTORS: ACHING

## 2021-06-09 ASSESSMENT — PAIN DESCRIPTION - FREQUENCY
FREQUENCY: CONTINUOUS

## 2021-06-09 ASSESSMENT — PAIN - FUNCTIONAL ASSESSMENT
PAIN_FUNCTIONAL_ASSESSMENT: PREVENTS OR INTERFERES SOME ACTIVE ACTIVITIES AND ADLS
PAIN_FUNCTIONAL_ASSESSMENT: ACTIVITIES ARE NOT PREVENTED
PAIN_FUNCTIONAL_ASSESSMENT: ACTIVITIES ARE NOT PREVENTED

## 2021-06-09 ASSESSMENT — PAIN SCALES - GENERAL
PAINLEVEL_OUTOF10: 4
PAINLEVEL_OUTOF10: 8
PAINLEVEL_OUTOF10: 4
PAINLEVEL_OUTOF10: 0
PAINLEVEL_OUTOF10: 7
PAINLEVEL_OUTOF10: 8
PAINLEVEL_OUTOF10: 7

## 2021-06-09 ASSESSMENT — PAIN DESCRIPTION - PROGRESSION
CLINICAL_PROGRESSION: GRADUALLY WORSENING

## 2021-06-09 ASSESSMENT — PAIN DESCRIPTION - LOCATION
LOCATION: FOOT

## 2021-06-09 NOTE — PROGRESS NOTES
GENERAL SURGERY PROGRESS NOTE    Denise Carrillo is a 79 y.o. male with DM, PAD and bilateral foot ulcers left worse than right. Subjective:  Doing ok today. Pain well controlled. No fevers or chills. Had arterial US yesterday. Objective:    Vitals: VITALS:  BP (!) 120/56   Pulse 68   Temp 98.2 °F (36.8 °C) (Oral)   Resp 16   Ht 6' (1.829 m)   Wt 260 lb (117.9 kg)   SpO2 95%   BMI 35.26 kg/m²     I/O: 06/08 0701 - 06/09 0700  In: 100 [P.O.:100]  Out: 950 [Urine:350]    Labs/Imaging Results:   Lab Results   Component Value Date     06/08/2021    K 4.5 06/08/2021    K 5.1 01/10/2018     06/08/2021    CO2 25 06/08/2021    BUN 8 06/08/2021    CREATININE 1.4 06/08/2021    GLUCOSE 141 06/08/2021    CALCIUM 8.2 06/08/2021      Lab Results   Component Value Date    WBC 5.4 06/08/2021    HGB 10.0 (L) 06/08/2021    HCT 31.9 (L) 06/08/2021    MCV 91.1 06/08/2021     06/08/2021       IV Fluids: sodium chloride    dextrose    Scheduled Meds:   vancomycin, 1,500 mg, Intravenous, Q24H    amLODIPine, 10 mg, Oral, Daily    pantoprazole, 40 mg, Oral, QAM AC    meropenem, 1,000 mg, Intravenous, Q8H    atorvastatin, 40 mg, Oral, Nightly    carvedilol, 3.125 mg, Oral, BID    [Held by provider] clopidogrel, 75 mg, Oral, Daily    dicyclomine, 10 mg, Oral, 4x Daily AC & HS    doxazosin, 4 mg, Oral, BID    pregabalin, 150 mg, Oral, TID    torsemide, 100 mg, Oral, BID    sodium chloride flush, 5-40 mL, Intravenous, BID    insulin lispro, 0-12 Units, Subcutaneous, Q4H    Physical Exam:  General: A&O x 3, no distress. HEENT: Anicteric sclerae, MMM. Extremities: bilateral feet with dressings in place  Abdomen: Soft, nontender, nondistended. Assessment and Plan:  79 y.o. male with bilateral foot ulcers. US yesterday with proximal arterial disease on the right, more distal disease/vessel occlusion on the left.       Patient Active Problem List:     Chronic coronary artery disease     Automatic implantable cardioverter-defibrillator in situ     Chronic combined systolic and diastolic heart failure (HCC)     Chronic kidney disease, stage III (moderate) (HCC)     Mixed hyperlipidemia     Sick sinus syndrome (HCC)     Type 2 diabetes mellitus with diabetic polyneuropathy ([facility])     Spinal stenosis of lumbar region     Obesity, Class I, BMI 30-34.9     Postoperative hypertension     S/P partial colectomy     Tubulovillous adenoma of colon     Microalbuminuria     PVD (peripheral vascular disease) ([facility])     Limb ischemia     Necrotic toes (HCC)     Toe gangrene (HCC)     Diabetic foot infection ([facility])     Chronic kidney disease (CKD) stage G3a/A2, moderately decreased glomerular filtration rate (GFR) between 45-59 mL/min/1.73 square meter and albuminuria creatinine ratio between  mg/g (HCC)     DM (diabetes mellitus) ([facility])     Edema     ICD (implantable cardioverter-defibrillator) battery depletion     Hyperkalemia     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[facility])     Ischemia of toe     Acute kidney injury ([address]     Fluid overload     DM (diabetes mellitus) ([address]     Hypertensive emergency     Precordial pain     Acute chest pain     Unstable angina ([facility])     Chronic kidney disease (CKD) stage G3a/A3, moderately decreased glomerular filtration rate (GFR) between 45-59 mL/min/1.73 square meter and albuminuria creatinine ratio greater than 300 mg/g (HCC)     Cardiomyopathy (HCC)     Hypertensive crisis     Diabetic neuropathy (HCC)     HTN (hypertension)     Epigastric pain     Acute on chronic congestive heart failure (HCC)     Bilateral lower extremity edema     Acute on chronic systolic CHF (congestive heart failure) ([facility])     Diabetic foot ulcer with osteomyelitis ([address]     Visit for wound check     Moderate malnutrition ([address]      - will need bilateral foot debridement this admission, left foot is worse than the right  - If vascular status could be improved prior to debridement he will have a better chance at limb salvage.   If no room for improvement we can attempt foot debridement on the left but he will likely have difficulty with healing and may end up requiring a BKA  - will continue to follow      Briseyda Vinson MD

## 2021-06-09 NOTE — CONSULTS
Department of Cardiovascular & Thoracic Surgery   Consult Note    Reason for Consult:  PAD    Requesting Physician: Dr. Ofe Hidalgo    Date of Consult: 6/9/21      History Obtained From:  patient     HISTORY OF PRESENT ILLNESS:    The patient is a 79 y.o. male who presents with bilateral foot wounds. He has had increasing pain in the left foot. He had a partial 1st toe amputation and he states that \"maggots nibbled it down\". He had LLE angio and PTA fem/pop last December. He did not follow up in office after that. Pt is diabetic. He is a smoker. Past Medical History:        Diagnosis Date    Acid reflux     Acute MI ([facility] 2004, 2008    Arthritis     Back    Broken teeth     Upper Front    CAD (coronary artery disease)     Sees Dr. Dulce Marcus [facility])     per old chart    CHF (congestive heart failure) ([facility]     Chronic back pain     Chronic kidney disease     STAGE 3 KIDNEY FAILURE- \"from my diabetes- do not follow with any one- have seen Dr Yany Franklin in the past\"    Diabetes mellitus ([facility] Dx 1965    per old chart pt has been diabetic since age 13    Diabetic neuropathy ([facility]     \"in my feet\"    H/O cardiovascular stress test 08/25/2016    H/O Doppler ultrasound 09/27/2018    Moderate disease of the right lower extremity with an JALEN of 0.72.   Moderate to severe disease of the left lower extremity with an JALEN of 0.55.   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    Neuropathy     both feet    neuropathy     PAD (peripheral artery disease) ([facility] 09/27/2018    PVD (peripheral vascular disease) ([facility]     Sick sinus syndrome (HCC)     Sleep apnea     \"sleep study 3 yrs ago- could not tolerate the cpap made me too dry\"    Spinal stenosis     Teeth missing     Upper And Lower    Type 2 diabetes mellitus without complication [facility])      Past Surgical History:        Procedure Laterality Date    CARDIAC CATHETERIZATION      per old chart done 10/2014    CARDIAC CATHETERIZATION  07/14/2017    with angiography of leg    CARDIAC CATHETERIZATION  11/20/2018    PATENT STENTS OF ALL THREE MAJOR VESSELS    CARDIAC DEFIBRILLATOR PLACEMENT  06/04/2010    [employer] [address] Defibrillator Implanted    COLECTOMY Right 08/26/2016    laparascopic; robotic assisted    COLONOSCOPY  08/04/2016    CORONARY ANGIOPLASTY      \"15 Heart Stents\"    CORONARY ANGIOPLASTY WITH STENT PLACEMENT      per old chart had angio with stent to circumflex and obtuse marginal artery at [facility] 5/2010( old chart also gives hx of stent placement done 2000,2004 and 2005)   [address] [facility] [place]      Teeth Extracted In Past    PACEMAKER PLACEMENT  06/04/2010    [employer] [address] Defibrillator Implanted    TOE AMPUTATION Right 09/12/2017    Rt 3rd toe    TOE AMPUTATION Right 01/09/2018     Right 5th toe amputation and Toenails trimmed left 2,3,4 and 5th toes    TOE AMPUTATION Left 12/26/2020    LEFT GREAT TOE AMPUTATION performed by Mary De Paz MD at [facility]      per old chart had balloon angioplasty right superfical femoral artery,right popliteal artery,,right ant.tibial artery, right tibioperoneal trunk, and right post.tibial artery wna stent placement right popliteal artery and superfical femoral artery 7/2012     Current Medications:   Current Facility-Administered Medications: vancomycin (VANCOCIN) 1,500 mg in dextrose 5 % 500 mL IVPB, 1,500 mg, Intravenous, Q24H  hydrALAZINE (APRESOLINE) 10 mg in sodium chloride 0.9 % 50 mL ivpb, 10 mg, Intravenous, Q6H PRN  amLODIPine (NORVASC) tablet 10 mg, 10 mg, Oral, Daily  pantoprazole (PROTONIX) tablet 40 mg, 40 mg, Oral, QAM AC  meropenem (MERREM) 1,000 mg in sodium chloride 0.9 % 100 mL IVPB (mini-bag), 1,000 mg, Intravenous, Q8H  albuterol sulfate  (90 Base) MCG/ACT inhaler 2 puff, 2 puff, Inhalation, Q4H PRN  atorvastatin (LIPITOR) tablet 40 mg, 40 mg, Oral, Nightly  carvedilol (COREG) tablet 3.125 mg, 3.125 mg, Oral, BID  [Held by provider] clopidogrel (PLAVIX) tablet 75 mg, 75 mg, Oral, Daily  dicyclomine (BENTYL) capsule 10 mg, 10 mg, Oral, 4x Daily AC & HS  doxazosin (CARDURA) tablet 4 mg, 4 mg, Oral, BID  pregabalin (LYRICA) capsule 150 mg, 150 mg, Oral, TID  torsemide (DEMADEX) tablet 100 mg, 100 mg, Oral, BID  sodium chloride flush 0.9 % injection 5-40 mL, 5-40 mL, Intravenous, BID  sodium chloride flush 0.9 % injection 5-40 mL, 5-40 mL, Intravenous, PRN  0.9 % sodium chloride infusion, 25 mL, Intravenous, PRN  ondansetron (ZOFRAN-ODT) disintegrating tablet 4 mg, 4 mg, Oral, Q8H PRN **OR** ondansetron (ZOFRAN) injection 4 mg, 4 mg, Intravenous, Q6H PRN  polyethylene glycol (GLYCOLAX) packet 17 g, 17 g, Oral, Daily PRN  acetaminophen (TYLENOL) tablet 650 mg, 650 mg, Oral, Q6H PRN **OR** acetaminophen (TYLENOL) suppository 650 mg, 650 mg, Rectal, Q6H PRN  glucose (GLUTOSE) 40 % oral gel 15 g, 15 g, Oral, PRN  dextrose 50 % IV solution, 12.5 g, Intravenous, PRN  glucagon (rDNA) injection 1 mg, 1 mg, Intramuscular, PRN  dextrose 5 % solution, 100 mL/hr, Intravenous, PRN  insulin lispro (HUMALOG) injection vial 0-12 Units, 0-12 Units, Subcutaneous, Q4H  morphine sulfate (PF) injection 2 mg, 2 mg, Intravenous, Q2H PRN **OR** morphine sulfate (PF) injection 4 mg, 4 mg, Intravenous, Q2H PRN  Allergies: Allergies   Allergen Reactions    Pcn [Penicillins] Hives    Fentanyl Itching       Social History:   Social History     Socioeconomic History    Marital status:       Spouse name: Not on file    Number of children: Not on file    Years of education: Not on file    Highest education level: Not on file   Occupational History    Not on file   Tobacco Use    Smoking status: Current Some Day Smoker     Packs/day: 0.10     Years: 36.00     Pack years: 3.60     Types: Cigars     Start date: 1/1/1980    Smokeless tobacco: Never Used   Vaping Use    Vaping Use: Never used   Substance and Sexual Activity    Alcohol use: No    Drug use: Yes     Types: Marijuana    Sexual activity: Never   Other Topics Concern    Not on file   Social History Narrative    Not on file     Social Determinants of Health     Financial Resource Strain:     Difficulty of Paying Living Expenses:    Food Insecurity:     Worried About Running Out of Food in the Last Year:     [address] in the Last Year:    Transportation Needs:     Lack of Transportation (Medical):      Lack of Transportation (Non-Medical):    Physical Activity:     Days of Exercise per Week:     Minutes of Exercise per Session:    Stress:     Feeling of Stress :    Social Connections:     Frequency of Communication with Friends and Family:     Frequency of Social Gatherings with Friends and Family:     Attends [employer] Services:     Active Member of Clubs or Organizations:     Attends Club or Organization Meetings:     Marital Status:    Intimate Partner Violence:     Fear of Current or Ex-Partner:     Emotionally Abused:     Physically Abused:     Sexually Abused:        Family History:        Problem Relation Age of Onset    Diabetes Mother     Stroke Mother     High Blood Pressure Mother     Vision Loss Mother     Cancer Father         Prostate Cancer    Diabetes Sister     Neuropathy Sister     Other Sister         \"Breathing Problems\"    Heart Disease Sister     Early Death Sister 62        Heart Complications    Cancer Brother         \"Stomach Cancer\"    High Blood Pressure Brother     Diabetes Brother     Heart Disease Brother     High Blood Pressure Brother     Cancer Son least   moderate diffuse disease.           IMPRESSION  Patient Active Problem List   Diagnosis    Chronic coronary artery disease    Automatic implantable cardioverter-defibrillator in situ    Chronic combined systolic and diastolic heart failure ([facility])    Chronic kidney disease, stage III (moderate) ([facility])    Mixed hyperlipidemia    S[facility] syndrome [facility]    Type 2 diabetes mellitus with diabetic polyneuropathy ([facility]    Spinal stenosis of lumbar region    Obesity, Class I, BMI 30-34.9    Postoperative hypertension    S/P partial colectomy    Tubulovillous adenoma of colon    Microalbuminuria    PVD (peripheral vascular disease) ([facility])    Limb ischemia    Necrotic toes ([facility])    Toe gangrene ([facility])    Diabetic foot infection ([facility]    Chronic kidney disease (CKD) stage G3a/A2, moderately decreased glomerular filtration rate (GFR) between 45-59 mL/min/1.73 square meter and albuminuria creatinine ratio between  mg/g ([facility])    DM (diabetes mellitus) ([facility]    Edema    ICD (implantable cardioverter-defibrillator) battery depletion    Hyperkalemia    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[facility])    Ischemia of toe    Acute kidney injury ([facility]    Fluid overload    DM (diabetes mellitus) ([facility]    Hypertensive emergency    Precordial pain    Acute chest pain    Unstable angina ([facility])    Chronic kidney disease (CKD) stage G3a/A3, moderately decreased glomerular filtration rate (GFR) between 45-59 mL/min/1.73 square meter and albuminuria creatinine ratio greater than 300 mg/g ([facility])    Cardiomyopathy ([facility])    Hypertensive crisis    Diabetic neuropathy ([facility])    HTN (hypertension)    Epigastric pain    Acute on chronic congestive heart failure ([facility])    Bilateral lower extremity edema    Acute on chronic systolic CHF (congestive heart failure) ([facility])    Diabetic foot ulcer with osteomyelitis ([facility]    Visit for wound check    Moderate malnutrition ([facility])       PAD      RECOMMENDATIONS:  Duplex suggests inflow disease. Will plan on angiogram with L radial access to evaluate further. We will do this during this admission but unsure of timing right now. General surgery plans for debridement noted.      Pt seen with Dr. Familia Patricio PA-C

## 2021-06-09 NOTE — PROGRESS NOTES
Physician Progress Note      Edin Gonzalez  CSN #:                  [id number]  :                       1950  ADMIT DATE:       2021 7:58 PM  [address] [place] DATE:  RESPONDING  PROVIDER #:        Javier Pope MD          QUERY TEXT:    Medical Team,    Pt admitted with DFU w/ osteomyelitis and has HFrEF documented. If possible,   please document in progress notes and discharge summary further specificity   regarding the acuity of CHF:    The medical record reflects the following:  Risk Factors: CAD, cardiomyopathy  Clinical Indicators: chest pain, documentation by Cardiology of HFrEF-stable,   BNP 4,762, BLE #= edema, no CXR  Treatment: labs, Cardiology consult, ntg prn, demadex    Thank you,  Jaun Gutierrez RN CDS  Options provided:  -- Acute on Chronic Systolic CHF/HFrEF  -- Chronic Systolic CHF/HFrEF  -- Other - I will add my own diagnosis  -- Disagree - Not applicable / Not valid  -- Disagree - Clinically unable to determine / Unknown  -- Refer to Clinical Documentation Reviewer    PROVIDER RESPONSE TEXT:    This patient has chronic systolic CHF/HFrEF.     Query created by: Zenia Francis on 2021 11:22 AM      Electronically signed by:  Javier Pope MD 2021 11:45 AM

## 2021-06-09 NOTE — PROGRESS NOTES
Nephrology Progress Note  6/9/2021 2:50 PM        Subjective:   Admit Date: 6/7/2021  PCP: Chris Sanchez    Interval History: seen in am     Diet: NPO    ROS:  Going for angio of LE     Data:     Current meds:    vancomycin  1,500 mg Intravenous Q24H    amLODIPine  10 mg Oral Daily    pantoprazole  40 mg Oral QAM AC    meropenem  1,000 mg Intravenous Q8H    atorvastatin  40 mg Oral Nightly    carvedilol  3.125 mg Oral BID    [Held by provider] clopidogrel  75 mg Oral Daily    dicyclomine  10 mg Oral 4x Daily AC & HS    doxazosin  4 mg Oral BID    pregabalin  150 mg Oral TID    torsemide  100 mg Oral BID    sodium chloride flush  5-40 mL Intravenous BID    insulin lispro  0-12 Units Subcutaneous Q4H      sodium chloride      dextrose           I/O last 3 completed shifts: In: 100 [P.O.:100]  Out: 950 [Urine:350; Emesis/NG output:600]    CBC:   Recent Labs     06/07/21 1939 06/08/21  0830   WBC 7.1 5.4   HGB 10.9* 10.0*    230          Recent Labs     06/07/21 1939 06/08/21  0830    138   K 4.2 4.5    105   CO2 28 25   BUN 9 8   CREATININE 1.4* 1.4*   GLUCOSE 213* 141*       Lab Results   Component Value Date    CALCIUM 8.2 (L) 06/08/2021    PHOS 3.1 12/23/2020       Objective:     Vitals: BP (!) 140/62   Pulse 66   Temp 98.2 °F (36.8 °C) (Oral)   Resp 16   Ht 6' (1.829 m)   Wt 260 lb (117.9 kg)   SpO2 95%   BMI 35.26 kg/m²     General appearance:  No distress   HEENT:  +  conj pallor   Neck:  supple  Lungs:  No gross crackles  Heart:  Seems RRR  Abdomen: soft  Extremities:  ++ ch edema   Toe necrosis         Problem List :         Impression :     1. CKD stage 3 a/b A3- stable - urine  bland and has close to 1 G/d proteinuria   2. Skin/ soft tissue and bone infection with ASCVD  3. HTN/ acceptable     Recommendation/Plan  :     1. He is going for angio  2. Also may need amputation   3. Mainly good control of BP/lipid/   4.  Also try cardio jona protective  meds  as much as he tolerates   5. Low salt DASh diet  6.  Follow clinically  And bio chemically       Mya Colindres MD MD

## 2021-06-09 NOTE — PROGRESS NOTES
complication ([facility] Past surgical history:   has a past surgical history that includes Coronary angioplasty with stent; Dental surgery; Colonoscopy (08/04/2016); pacemaker placement (06/04/2010); vascular surgery; colectomy (Right, 08/26/2016); Toe amputation (Right, 09/12/2017); Toe amputation (Right, 01/09/2018); Cardiac catheterization; Cardiac defibrillator placement (06/04/2010); Coronary angioplasty; Cardiac catheterization (07/14/2017); Cardiac catheterization (11/20/2018); and Toe amputation (Left, 12/26/2020). Social History:   reports that he has been smoking cigars. He started smoking about 41 years ago. He has a 3.60 pack-year smoking history. He has never used smokeless tobacco. He reports current drug use. Drug: Marijuana. He reports that he does not drink alcohol. Family history:  family history includes Cancer in his brother, father, and son; Diabetes in his brother, mother, and sister; Early Death (age of onset: 62) in his sister; Heart Disease in his brother and sister; High Blood Pressure in his brother, brother, and mother; Neuropathy in his sister; Other in his sister; Stroke in his mother; Vision Loss in his mother. Allergies   Allergen Reactions    Pcn [Penicillins] Hives    Fentanyl Itching       Review of Systems:   All 14 systems were reviewed and are negative  Except for the positive findings which are documented     BP (!) 120/56   Pulse 68   Temp 98.2 °F (36.8 °C) (Oral)   Resp 16   Ht 6' (1.829 m)   Wt 260 lb (117.9 kg)   SpO2 95%   BMI 35.26 kg/m²       Intake/Output Summary (Last 24 hours) at 6/9/2021 0913  Last data filed at 6/8/2021 1804  Gross per 24 hour   Intake 100 ml   Output 950 ml   Net -850 ml       Physical Exam:  Physical Exam  Constitutional:       General: He is not in acute distress. Appearance: He is not ill-appearing. HENT:      Head: Normocephalic and atraumatic. Cardiovascular:      Rate and Rhythm: Normal rate and regular rhythm.   No extrasystoles are present. Pulses: Decreased pulses (Unable to palpate posterior tibialis or dorsalis pedis pulses of left foot due to wound dressing. Right foot pulses unable to be assesses by palpating). Radial pulses are 2+ on the right side and 2+ on the left side. Heart sounds: S1 normal and S2 normal. No murmur heard. Pulmonary:      Effort: Pulmonary effort is normal.      Breath sounds: Rhonchi present. Musculoskeletal:      Right lower leg: Edema present. Left lower leg: Edema present. Skin:     General: Skin is warm and dry. Comments: Wounds of both feet in dressings. Dry, rough skin noted from mid tibia and distally   Neurological:      General: No focal deficit present. Mental Status: He is alert and oriented to person, place, and time. Psychiatric:         Mood and Affect: Mood normal.         Behavior: Behavior normal.         Thought Content:  Thought content normal.         Judgment: Judgment normal.          Medications:    vancomycin  1,500 mg Intravenous Q24H    amLODIPine  10 mg Oral Daily    pantoprazole  40 mg Oral QAM AC    meropenem  1,000 mg Intravenous Q8H    atorvastatin  40 mg Oral Nightly    carvedilol  3.125 mg Oral BID    [Held by provider] clopidogrel  75 mg Oral Daily    dicyclomine  10 mg Oral 4x Daily AC & HS    doxazosin  4 mg Oral BID    pregabalin  150 mg Oral TID    torsemide  100 mg Oral BID    sodium chloride flush  5-40 mL Intravenous BID    insulin lispro  0-12 Units Subcutaneous Q4H      sodium chloride      dextrose       hydrALAZINE (APRESOLINE) ivpb, albuterol sulfate HFA, sodium chloride flush, sodium chloride, ondansetron **OR** ondansetron, polyethylene glycol, acetaminophen **OR** acetaminophen, glucose, dextrose, glucagon (rDNA), dextrose, morphine **OR** morphine    Lab Data:  CBC:   Recent Labs     06/07/21  1939 06/08/21  0830   WBC 7.1 5.4   HGB 10.9* 10.0*   HCT 34.7* 31.9*   MCV 91.8 91.1    230 auscultation, respiration easy  Cardiovascular:  RRR S1S2  Abdomen:   nontender  Extremities:  +1 edema  Pulses; palpable  Neuro: grossly normal      MEDICAL DECISION MAKING;    I agree with the above plan, which was planned by myself and discussed with NP.   Stable cradiac status  Revasc per Tha Skinner MD [facility]

## 2021-06-09 NOTE — PROGRESS NOTES
[address] [facility]  consulted by Dr. Arslan Kaplan for monitoring and adjustment. Indication for treatment: Diabetic foot ulcer, Osteomyelitis  Goal trough: 15 mcg/mL    Pertinent Laboratory Values:   Temp Readings from Last 3 Encounters:   06/09/21 98.4 °F (36.9 °C) (Oral)   03/11/21 97.8 °F (36.6 °C) (Oral)   01/06/21 98.3 °F (36.8 °C)     Recent Labs     06/07/21  1939 06/08/21  0830   WBC 7.1 5.4   LACTATE 0.8  --      Recent Labs     06/07/21 1939 06/08/21  0830   BUN 9 8   CREATININE 1.4* 1.4*     Estimated Creatinine Clearance: 65 mL/min (A) (based on SCr of 1.4 mg/dL (H)). Intake/Output Summary (Last 24 hours) at 6/9/2021 1540  Last data filed at 6/8/2021 1804  Gross per 24 hour   Intake 100 ml   Output 600 ml   Net -500 ml       Pertinent Cultures:  Date    Source    Results  06/07   Blood    NGTD  06/07   Wound    Proteus mirabilis, Staph aureus, Corynebacterium striatum    Assessment:  · WBC WNL; patient remains afebrile  · History of CKD stage III: SCr = 1.4, BUN = 8, UOP ok  · Day(s) of therapy: 2  · Vancomycin concentration:   · 06/09 - To be collected    Plan:  · Vancomycin 2,000 mg IV given in ED; Follow initially with Vancomycin 1,500 mg IV Q 24 Hours  · Check Vancomycin concentration prior to third dose  · Pharmacy will continue to monitor patient and adjust therapy as indicated    Brent 3 06/09/2021 @ 7 PM    Thank you for the consult.   Bobby Olson, [facility], [address] [place] [address] Kelly   6/9/2021 3:40 PM

## 2021-06-09 NOTE — PROGRESS NOTES
requires continued admission due to  diabetic foot ulcer with gangrene-we will likely need amputation   MDM [] Low, [] Moderate,[x]  High  Patient's risk as above due to diabetic foot ulcer with gangrene and possible osteo-     History of Present Illness:     Chief Complaint: Diabetic foot ulcer with osteomyelitis ([address]  Mayank Frances is a 79 y.o.  male is seen and examined, pain is better controlled today. Has no other complaints. He reports that he would like to get well before he discharges from the hospital.  He is appreciative of all the consults. Denies fevers or chills. He occasionally has night sweats. Objective: Intake/Output Summary (Last 24 hours) at 6/9/2021 1629  Last data filed at 6/9/2021 1601  Gross per 24 hour   Intake 100 ml   Output 1200 ml   Net -1100 ml      Vitals:   Vitals:    06/09/21 1449   BP: (!) 129/54   Pulse: 82   Resp: 16   Temp: 98.4 °F (36.9 °C)   SpO2: 94%     Physical Exam:   GEN Awake male, sitting upright in bed in no apparent distress. Appears given age. EYES Pupils are equally round. No scleral erythema, discharge, or conjunctivitis. HENT Mucous membranes are moist.   NECK Supple, no apparent thyromegaly or masses. RESP Clear to auscultation, no wheezes, rales or rhonchi. Symmetric chest movement while on room air. CARDIO/VASC: S1/S2 auscultated. RRR. ICD in chest  GI Abdomen is soft without significant tenderness, masses, or guarding. Bowel sounds are normoactive. Rectal exam deferred.  No costovertebral angle tenderness. Normal appearing external genitalia. Fuller catheter is not present. HEME/LYMPH No palpable cervical lymphadenopathy and no hepatosplenomegaly. No petechiae or ecchymoses.   MSK Charcot's deformity both ankles, lymphedematous lower extremity, s/p left proximal phalanx amputation, right third and fifth toe amputation  SKIN bilateral lower extremity with atrophic changes, healed ulceration  NEURO Cranial nerves appear grossly intact, normal speech, no lateralizing weakness. PSYCH Awake, alert, oriented x 4. Affect appropriate.     Medications:   Medications:    vancomycin  1,500 mg Intravenous Q24H    amLODIPine  10 mg Oral Daily    pantoprazole  40 mg Oral QAM AC    meropenem  1,000 mg Intravenous Q8H    atorvastatin  40 mg Oral Nightly    carvedilol  3.125 mg Oral BID    [Held by provider] clopidogrel  75 mg Oral Daily    dicyclomine  10 mg Oral 4x Daily AC & HS    doxazosin  4 mg Oral BID    pregabalin  150 mg Oral TID    torsemide  100 mg Oral BID    sodium chloride flush  5-40 mL Intravenous BID    insulin lispro  0-12 Units Subcutaneous Q4H      Infusions:    sodium chloride      dextrose       PRN Meds: hydrALAZINE (APRESOLINE) ivpb, 10 mg, Q6H PRN  albuterol sulfate HFA, 2 puff, Q4H PRN  sodium chloride flush, 5-40 mL, PRN  sodium chloride, 25 mL, PRN  ondansetron, 4 mg, Q8H PRN   Or  ondansetron, 4 mg, Q6H PRN  polyethylene glycol, 17 g, Daily PRN  acetaminophen, 650 mg, Q6H PRN   Or  acetaminophen, 650 mg, Q6H PRN  glucose, 15 g, PRN  dextrose, 12.5 g, PRN  glucagon (rDNA), 1 mg, PRN  dextrose, 100 mL/hr, PRN  morphine, 2 mg, Q2H PRN   Or  morphine, 4 mg, Q2H PRN          Electronically signed by Tish Mendoza MD on 6/9/2021 at 4:29 PM

## 2021-06-10 LAB
ALBUMIN SERPL-MCNC: 2.5 GM/DL (ref 3.4–5)
ALP BLD-CCNC: 63 IU/L (ref 40–128)
ALT SERPL-CCNC: <5 U/L (ref 10–40)
ANION GAP SERPL CALCULATED.3IONS-SCNC: 8 MMOL/L (ref 4–16)
AST SERPL-CCNC: 9 IU/L (ref 15–37)
BILIRUB SERPL-MCNC: 0.9 MG/DL (ref 0–1)
BUN BLDV-MCNC: 14 MG/DL (ref 6–23)
CALCIUM SERPL-MCNC: 7.7 MG/DL (ref 8.3–10.6)
CHLORIDE BLD-SCNC: 102 MMOL/L (ref 99–110)
CO2: 28 MMOL/L (ref 21–32)
CREAT SERPL-MCNC: 2.3 MG/DL (ref 0.9–1.3)
GFR AFRICAN AMERICAN: 34 ML/MIN/1.73M2
GFR NON-AFRICAN AMERICAN: 28 ML/MIN/1.73M2
GLUCOSE BLD-MCNC: 105 MG/DL (ref 70–99)
GLUCOSE BLD-MCNC: 109 MG/DL (ref 70–99)
GLUCOSE BLD-MCNC: 122 MG/DL (ref 70–99)
GLUCOSE BLD-MCNC: 147 MG/DL (ref 70–99)
GLUCOSE BLD-MCNC: 156 MG/DL (ref 70–99)
GLUCOSE BLD-MCNC: 164 MG/DL (ref 70–99)
GLUCOSE BLD-MCNC: 169 MG/DL (ref 70–99)
GLUCOSE BLD-MCNC: 179 MG/DL (ref 70–99)
GLUCOSE BLD-MCNC: 88 MG/DL (ref 70–99)
GLUCOSE BLD-MCNC: 93 MG/DL (ref 70–99)
MAGNESIUM: 1.8 MG/DL (ref 1.8–2.4)
PHOSPHORUS: 3.7 MG/DL (ref 2.5–4.9)
POTASSIUM SERPL-SCNC: 4.2 MMOL/L (ref 3.5–5.1)
SODIUM BLD-SCNC: 138 MMOL/L (ref 135–145)
TOTAL PROTEIN: 5.5 GM/DL (ref 6.4–8.2)

## 2021-06-10 PROCEDURE — APPSS60 APP SPLIT SHARED TIME 46-60 MINUTES: Performed by: NURSE PRACTITIONER

## 2021-06-10 PROCEDURE — 51798 US URINE CAPACITY MEASURE: CPT

## 2021-06-10 PROCEDURE — 6370000000 HC RX 637 (ALT 250 FOR IP): Performed by: STUDENT IN AN ORGANIZED HEALTH CARE EDUCATION/TRAINING PROGRAM

## 2021-06-10 PROCEDURE — 99223 1ST HOSP IP/OBS HIGH 75: CPT | Performed by: INTERNAL MEDICINE

## 2021-06-10 PROCEDURE — 6370000000 HC RX 637 (ALT 250 FOR IP): Performed by: INTERNAL MEDICINE

## 2021-06-10 PROCEDURE — 99232 SBSQ HOSP IP/OBS MODERATE 35: CPT | Performed by: INTERNAL MEDICINE

## 2021-06-10 PROCEDURE — 6360000002 HC RX W HCPCS: Performed by: STUDENT IN AN ORGANIZED HEALTH CARE EDUCATION/TRAINING PROGRAM

## 2021-06-10 PROCEDURE — 80053 COMPREHEN METABOLIC PANEL: CPT

## 2021-06-10 PROCEDURE — 99232 SBSQ HOSP IP/OBS MODERATE 35: CPT | Performed by: SURGERY

## 2021-06-10 PROCEDURE — 82962 GLUCOSE BLOOD TEST: CPT

## 2021-06-10 PROCEDURE — 82565 ASSAY OF CREATININE: CPT

## 2021-06-10 PROCEDURE — 2580000003 HC RX 258: Performed by: INTERNAL MEDICINE

## 2021-06-10 PROCEDURE — 36415 COLL VENOUS BLD VENIPUNCTURE: CPT

## 2021-06-10 PROCEDURE — 84100 ASSAY OF PHOSPHORUS: CPT

## 2021-06-10 PROCEDURE — 2580000003 HC RX 258: Performed by: STUDENT IN AN ORGANIZED HEALTH CARE EDUCATION/TRAINING PROGRAM

## 2021-06-10 PROCEDURE — 2700000000 HC OXYGEN THERAPY PER DAY

## 2021-06-10 PROCEDURE — 6360000002 HC RX W HCPCS: Performed by: INTERNAL MEDICINE

## 2021-06-10 PROCEDURE — 1200000000 HC SEMI PRIVATE

## 2021-06-10 PROCEDURE — 76937 US GUIDE VASCULAR ACCESS: CPT

## 2021-06-10 PROCEDURE — 83735 ASSAY OF MAGNESIUM: CPT

## 2021-06-10 PROCEDURE — 94761 N-INVAS EAR/PLS OXIMETRY MLT: CPT

## 2021-06-10 RX ORDER — 0.9 % SODIUM CHLORIDE 0.9 %
250 INTRAVENOUS SOLUTION INTRAVENOUS ONCE
Status: COMPLETED | OUTPATIENT
Start: 2021-06-10 | End: 2021-06-10

## 2021-06-10 RX ADMIN — PANTOPRAZOLE SODIUM 40 MG: 40 TABLET, DELAYED RELEASE ORAL at 06:07

## 2021-06-10 RX ADMIN — DICYCLOMINE HYDROCHLORIDE 10 MG: 10 CAPSULE ORAL at 18:15

## 2021-06-10 RX ADMIN — DOXAZOSIN 4 MG: 4 TABLET ORAL at 22:50

## 2021-06-10 RX ADMIN — MORPHINE SULFATE 2 MG: 4 INJECTION, SOLUTION INTRAMUSCULAR; INTRAVENOUS at 15:14

## 2021-06-10 RX ADMIN — DICYCLOMINE HYDROCHLORIDE 10 MG: 10 CAPSULE ORAL at 14:29

## 2021-06-10 RX ADMIN — TORSEMIDE 100 MG: 20 TABLET ORAL at 08:06

## 2021-06-10 RX ADMIN — DICYCLOMINE HYDROCHLORIDE 10 MG: 10 CAPSULE ORAL at 22:49

## 2021-06-10 RX ADMIN — ATORVASTATIN CALCIUM 40 MG: 40 TABLET, FILM COATED ORAL at 22:50

## 2021-06-10 RX ADMIN — DICYCLOMINE HYDROCHLORIDE 10 MG: 10 CAPSULE ORAL at 06:07

## 2021-06-10 RX ADMIN — PREGABALIN 150 MG: 75 CAPSULE ORAL at 08:05

## 2021-06-10 RX ADMIN — CARVEDILOL 3.12 MG: 3.12 TABLET, FILM COATED ORAL at 22:50

## 2021-06-10 RX ADMIN — SODIUM CHLORIDE 250 ML: 9 INJECTION, SOLUTION INTRAVENOUS at 19:42

## 2021-06-10 RX ADMIN — SODIUM CHLORIDE, PRESERVATIVE FREE 10 ML: 5 INJECTION INTRAVENOUS at 08:09

## 2021-06-10 RX ADMIN — PREGABALIN 150 MG: 75 CAPSULE ORAL at 14:30

## 2021-06-10 RX ADMIN — MEROPENEM 1000 MG: 1 INJECTION, POWDER, FOR SOLUTION INTRAVENOUS at 08:01

## 2021-06-10 RX ADMIN — SODIUM CHLORIDE, PRESERVATIVE FREE 10 ML: 5 INJECTION INTRAVENOUS at 22:50

## 2021-06-10 RX ADMIN — CARVEDILOL 3.12 MG: 3.12 TABLET, FILM COATED ORAL at 08:06

## 2021-06-10 RX ADMIN — INSULIN LISPRO 2 UNITS: 100 INJECTION, SOLUTION INTRAVENOUS; SUBCUTANEOUS at 04:11

## 2021-06-10 RX ADMIN — VANCOMYCIN HYDROCHLORIDE 1250 MG: 5 INJECTION, POWDER, LYOPHILIZED, FOR SOLUTION INTRAVENOUS at 22:51

## 2021-06-10 RX ADMIN — MEROPENEM 1000 MG: 1 INJECTION, POWDER, FOR SOLUTION INTRAVENOUS at 00:15

## 2021-06-10 RX ADMIN — MORPHINE SULFATE 2 MG: 4 INJECTION, SOLUTION INTRAMUSCULAR; INTRAVENOUS at 08:31

## 2021-06-10 RX ADMIN — INSULIN LISPRO 2 UNITS: 100 INJECTION, SOLUTION INTRAVENOUS; SUBCUTANEOUS at 22:58

## 2021-06-10 RX ADMIN — PREGABALIN 150 MG: 75 CAPSULE ORAL at 22:50

## 2021-06-10 RX ADMIN — DOXAZOSIN 4 MG: 4 TABLET ORAL at 08:06

## 2021-06-10 RX ADMIN — SODIUM CHLORIDE 25 ML: 9 INJECTION, SOLUTION INTRAVENOUS at 00:16

## 2021-06-10 RX ADMIN — MORPHINE SULFATE 4 MG: 4 INJECTION, SOLUTION INTRAMUSCULAR; INTRAVENOUS at 22:50

## 2021-06-10 ASSESSMENT — PAIN DESCRIPTION - PAIN TYPE: TYPE: ACUTE PAIN

## 2021-06-10 ASSESSMENT — PAIN DESCRIPTION - FREQUENCY: FREQUENCY: CONTINUOUS

## 2021-06-10 ASSESSMENT — PAIN DESCRIPTION - DESCRIPTORS: DESCRIPTORS: ACHING

## 2021-06-10 ASSESSMENT — PAIN - FUNCTIONAL ASSESSMENT: PAIN_FUNCTIONAL_ASSESSMENT: PREVENTS OR INTERFERES SOME ACTIVE ACTIVITIES AND ADLS

## 2021-06-10 ASSESSMENT — PAIN DESCRIPTION - PROGRESSION: CLINICAL_PROGRESSION: GRADUALLY WORSENING

## 2021-06-10 ASSESSMENT — PAIN DESCRIPTION - ONSET: ONSET: ON-GOING

## 2021-06-10 ASSESSMENT — PAIN SCALES - GENERAL
PAINLEVEL_OUTOF10: 9
PAINLEVEL_OUTOF10: 8
PAINLEVEL_OUTOF10: 0
PAINLEVEL_OUTOF10: 10

## 2021-06-10 ASSESSMENT — PAIN DESCRIPTION - ORIENTATION: ORIENTATION: RIGHT;LEFT

## 2021-06-10 ASSESSMENT — PAIN DESCRIPTION - LOCATION: LOCATION: FOOT

## 2021-06-10 NOTE — CONSULTS
Infectious Disease Consult Note  6/10/2021   Patient Name: Randolph Villalta : 1950   Impression   Left Foot Infected DFU, Possible OM:  · Right Foot DFU:  · Afebrile, no leukocytosis  · -Left Foot wound culture: Proteus mirabilis, MRSA, Corynebacterium striatum  · -Blood cultures 0/2-NGTD  · CRP 37.6, ESR 66  · -XR Foot Left: Ulceration involving the stump of the left great toe. Bony irregularity of the proximal phalangeal stump, raising the possibilility of OM  · -XR Foot Right: no acute periostitis or bone destruction. · -Doppler BLE: no evidence of DVT  · -Duplex: imp of influx disease  · 6/10-MRI Left foot pending  · Dr. Alissa Farris, general surgery, onboard, imp of may require BKA (unilateral or bilateral) if unable to heal wounds, will follow vascular workup along with ABX regimen, plan for debridement     IDDM with Peripheral Neuropathy:  · -HbAIC 7.9    · Allergy to PCN (hives, facial swelling)    · Tobacco Abuse: Cigar    · Severe PVD:  · Dr. Betty Wolfe onboard  · -Duplex shows imp of inflow disease  · Plan for angiogram per left radial    · CKD3:  · Dr. Sharolyn Homans onboard    · Chest Pain/ HFrEF/ ICD x 6 years/ CAD:  · Dr. Natacha Brady onboard    · Morbid Obesity:  BMI: 35.26    · HLD     · COPD/ STACI, Oxygen NC PRN at home     Multi-morbidity: per PMHx:  S/p left hallux amputation    Plan:   Continue IV vancomycin, per pharmacy dosing,   · DC IV meropenem   · Start IV ceftriaxone 2 gm q24h   Trend CRP, ordered  · Await MRI left foot  · Follow with plan for angiogram per Dr. Justyn Kaplan  ·   Thank you for allowing me to consult in the care of this patient.  ------------------------  REASON FOR CONSULT: Infective syndrome     Requested by: Dr. Brittny Riley is a 79 y.o. -American male with PMH of COPD, HLD, BPH, IDDM, neuropathy, and morbid obesity  who was admitted 2021 for further evaluation and management of increased pain in his left foot for 1 week. Patient reports he noticed blood on his sock and floor, he removed his sock, he noticed maggots in his wound. Patient reports he has had problems with wound healing after his amputation of his left toe in December, 2020 and he has not been going to wound care. He has had chills but no fevers. He also complains fo chest pain. ? Infectious diseases service was consulted to evaluate the pt, and recommend further investigative and therapeutic measures. ROS: Other systems reviewed Including eyes, ENT, respiratory, cardiovascular, GI, , dermatologic, neurologic, psych, hem/lymphatic, musculoskeletal and endocrine were negative other than what is mentioned above.      Patient Active Problem List    Diagnosis Date Noted    Precordial pain 07/18/2018    Acute chest pain     Moderate malnutrition ([address] 06/08/2021    Visit for wound check     Diabetic foot ulcer with osteomyelitis ([address] 06/07/2021    Acute on chronic systolic CHF (congestive heart failure) ([address] 12/21/2020    Bilateral lower extremity edema     Acute on chronic congestive heart failure ([address] 12/10/2020    Epigastric pain 08/12/2020    HTN (hypertension) 05/20/2019    Diabetic neuropathy ([address] 05/02/2019    Hypertensive crisis 03/11/2019    Chronic kidney disease (CKD) stage G3a/A3, moderately decreased glomerular filtration rate (GFR) between 45-59 mL/min/1.73 square meter and albuminuria creatinine ratio greater than 300 mg/g ([facility]) 01/29/2019    Cardiomyopathy ([address] 01/29/2019    Unstable angina ([address] 11/19/2018    Hypertensive emergency 07/17/2018    Acute kidney injury ([address] 02/09/2018    Fluid overload 02/09/2018    DM (diabetes mellitus) ([address] 02/09/2018    Ischemia of toe     Hyperkalemia 01/09/2018    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[address]     ICD (implantable cardioverter-defibrillator) battery depletion 10/11/2017    Chronic kidney disease (CKD) stage G3a/A2, moderately decreased glomerular filtration rate (GFR) between 45-59 mL/min/1.73 square meter and albuminuria creatinine ratio between  mg/g ([facility]) 10/06/2017    DM (diabetes mellitus) ([facility] 10/06/2017    Edema 10/06/2017    Diabetic foot infection ([address]     Toe gangrene ([address] 07/26/2017    Necrotic toes ([facility] 07/06/2017    PVD (peripheral vascular disease) ([facility]     Limb ischemia     Microalbuminuria 01/22/2017    Tubulovillous adenoma of colon 10/20/2016    Postoperative hypertension 08/27/2016    S/P partial colectomy 08/27/2016    Chronic coronary artery disease 05/31/2016    Chronic kidney disease, stage III (moderate) ([facility] 05/31/2016    Sick sinus syndrome ([facility] 05/31/2016    Type 2 diabetes mellitus with diabetic polyneuropathy ([facility] 05/31/2016    Spinal stenosis of lumbar region 05/31/2016    Obesity, Class I, BMI 30-34.9 05/31/2016    Chronic combined systolic and diastolic heart failure ([address] 03/03/2016    Automatic implantable cardioverter-defibrillator in situ 09/03/2015    Mixed hyperlipidemia 04/13/2015     Past Medical History:   Diagnosis Date    Acid reflux     Acute MI ([address] 2004, 2008    Arthritis     Back    Broken teeth     Upper Front    CAD (coronary artery disease)     Sees Dr. Yovana Doyle [facility])     per old chart    CHF (congestive heart failure) ([facility])     Chronic back pain     Chronic kidney disease     STAGE 3 KIDNEY FAILURE- \"from my diabetes- do not follow with any one- have seen Dr Kaushik Urbina in the past\"    Diabetes mellitus ([facility] Dx 1965    per old chart pt has been diabetic since age 13    Diabetic neuropathy ([facility]     \"in my feet\"    H/O cardiovascular stress test 08/25/2016    H/O Doppler ultrasound 09/27/2018    Moderate disease of the right lower extremity with an JALEN of 0.72.   Moderate to severe disease of the left lower extremity with an JALEN of 0.55.   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    Neuropathy     both feet    neuropathy     PAD (peripheral artery disease) ([facility] 09/27/2018    PVD (peripheral vascular disease) ([facility]     Sick sinus syndrome (HCC)     Sleep apnea     \"sleep study 3 yrs ago- could not tolerate the cpap made me too dry\"    Spinal stenosis     Teeth missing     Upper And Lower    Type 2 diabetes mellitus without complication [facility])       Past Surgical History:   Procedure Laterality Date    CARDIAC CATHETERIZATION      per old chart done 10/2014    CARDIAC CATHETERIZATION  07/14/2017    with angiography of leg    CARDIAC CATHETERIZATION  11/20/2018    PATENT STENTS OF ALL THREE MAJOR VESSELS    CARDIAC DEFIBRILLATOR PLACEMENT  06/04/2010    [employer] [address] Defibrillator Implanted    COLECTOMY Right 08/26/2016    laparascopic; robotic assisted    COLONOSCOPY  08/04/2016    CORONARY ANGIOPLASTY      \"15 Heart Stents\"    CORONARY ANGIOPLASTY WITH STENT PLACEMENT      per old chart had angio with stent to circumflex and obtuse marginal artery at [facility] 5/2010( old chart also gives hx of stent placement done 2000,2004 and 2005)    DENTAL SURGERY      Teeth Extracted In Past    PACEMAKER PLACEMENT  06/04/2010    [employer] [address] DR Defibrillator Implanted    TOE AMPUTATION Right 09/12/2017    Rt 3rd toe    TOE AMPUTATION Right 01/09/2018     Right 5th toe amputation and Toenails trimmed left 2,3,4 and 5th toes    TOE AMPUTATION Left 12/26/2020    LEFT GREAT TOE AMPUTATION performed by Margaret Ferrari MD at [facility]      per old chart had balloon angioplasty right superfical femoral artery,right popliteal artery,,right ant.tibial artery, right tibioperoneal trunk, and right post.tibial artery wna stent placement right popliteal artery and superfical femoral artery 7/2012      Family History   Problem Relation Age of Onset    Diabetes Mother     Stroke Mother     High Blood Pressure Mother     Vision Loss Mother     Cancer Father         Prostate Cancer    Diabetes Sister     Neuropathy Sister     Other Sister         \"Breathing Problems\"    Heart Disease Sister     Early Death Sister 62        Heart Complications    Cancer Brother         \"Stomach Cancer\"    High Blood Pressure Brother     Diabetes Brother     Heart Disease Brother     High Blood Pressure Brother     Cancer Son         \"Testicle Cancer\"      Infectious disease related family history - not contibutory. SOCIAL HISTORY  Social History     Tobacco Use    Smoking status: Current Some Day Smoker     Packs/day: 0.10     Years: 36.00     Pack years: 3.60     Types: Cigars     Start date: 1/1/1980    Smokeless tobacco: Never Used   Substance Use Topics    Alcohol use: No       Born:[place], [place]   Lives: [address] with daughter and her family   Occupation:Retired as CDL from [employer]   No recent travel of significance.  No recent unusual exposures.  Pets: 2 dogs  ? ALLERGIES  Allergies   Allergen Reactions    Pcn [Penicillins] Hives    Fentanyl Itching      MEDICATIONS  Reviewed and are per the chart/EMR. ? Antibiotics:   Present:  Vancomycin 6/7-  Ceftriaxone 6/10-    Past:  Meropenem 6/7-10    ?  -------------------------------------------------------------------------------------------------------------------    Vital Signs:  Vitals:    06/10/21 0910   BP: 131/61   Pulse: 61   Resp: 18   Temp:    SpO2: 95%         Exam:    VS: noted; wt 260 lb (ll7.9 kg) Height 6'  Gen: alert and oriented X3, no distress  Skin: no stigmata of endocarditis  Wounds: C/D/I Right foot: right hallux with dried ulcerations, right 2nd toe dried ulcer.  Left foot: Left Hallux amputation stump open wound draining purulent fluid, 2nd toe with ulceration draining purulent fluid. HEMT: AT/NC Oropharynx pink, moist, and without lesions or exudates; dentition in good state of repair  Eyes: PERRLA, EOMI, conjunctiva pink, sclera anicteric. Neck: Supple. Trachea midline. No LAD. Chest: no distress and CTA. Good air movement. Heart: RRR and no MRG. Abd: soft, non-distended, no tenderness, no hepatomegaly. Normoactive bowel sounds. Ext: no clubbing, cyanosis, or edema  Neuro: Mental status intact. CN 2-12 intact and no focal sensory or motor deficits    ? Diagnostic Studies: reviewed  6/7/21 XR Foot Left:  Impression   Ulceration involving the stump of the left great toe.  Bony irregularity of   the proximal phalangeal stump, raising the possibility of osteomyelitis. 6/7/21 XR Foot Right:  Impression   1.  No acute periostitis or bony destruction. 6/7/21 VL Dup Lower Extremity Venous Bilateral:  Impression   No evidence of DVT in either lower extremity above the knee.  Deep veins   below the knee are not visualized due to diffuse subcutaneous edema. 6/8/21 VL Dup Lower Extremity Arteries Bilateral:  Impression   Aortic or iliac inflow disease, with loss of normal appearing triphasic   waveforms at the common femoral arteries.       Moderate stenosis within the proximal SFA on the right, with poststenotic   reduced velocities noted within the remainder of the left lower extremity,   with loss of normal multiphasic waveforms suggesting at least moderate   disease.  No significant focal severe stenosis seen.  Three-vessel runoff to   the right foot.       Moderate deep femoral artery stenosis on the right, with no severe stenosis   seen within the left lower extremity.  However the distal PTA and peroneal   artery are occluded.  Loss of multiphasic waveforms related to at least   moderate diffuse disease.      ??  I have examined this patient and available medical records on this date and have

## 2021-06-10 NOTE — PROGRESS NOTES
GENERAL SURGERY PROGRESS NOTE    Elizabeth Briones is a 79 y.o. male with DM, PAD and bilateral foot ulcers left worse than right. Subjective:  Doing ok today. Pain well controlled. No fevers or chills. Seen by Vascular surgery and angio postponed due to Cr. bump. Objective:    Vitals: VITALS:  /61   Pulse 61   Temp 98.4 °F (36.9 °C) (Oral)   Resp 18   Ht 6' (1.829 m)   Wt 260 lb (117.9 kg)   SpO2 95%   BMI 35.26 kg/m²     I/O: 06/09 0701 - 06/10 0700  In: 635 [I.V.:35]  Out: 1500 [Urine:1500]    Labs/Imaging Results:   Lab Results   Component Value Date     06/10/2021    K 4.2 06/10/2021    K 5.1 01/10/2018     06/10/2021    CO2 28 06/10/2021    BUN 14 06/10/2021    CREATININE 2.3 06/10/2021    GLUCOSE 93 06/10/2021    CALCIUM 7.7 06/10/2021      Lab Results   Component Value Date    WBC 5.4 06/08/2021    HGB 10.0 (L) 06/08/2021    HCT 31.9 (L) 06/08/2021    MCV 91.1 06/08/2021     06/08/2021       IV Fluids: sodium chloride Last Rate: Stopped (06/10/21 0109)    dextrose    Scheduled Meds:   vancomycin, 1,250 mg, Intravenous, Q24H    amLODIPine, 10 mg, Oral, Daily    pantoprazole, 40 mg, Oral, QAM AC    meropenem, 1,000 mg, Intravenous, Q8H    atorvastatin, 40 mg, Oral, Nightly    carvedilol, 3.125 mg, Oral, BID    [Held by provider] clopidogrel, 75 mg, Oral, Daily    dicyclomine, 10 mg, Oral, 4x Daily AC & HS    doxazosin, 4 mg, Oral, BID    pregabalin, 150 mg, Oral, TID    torsemide, 100 mg, Oral, BID    sodium chloride flush, 5-40 mL, Intravenous, BID    insulin lispro, 0-12 Units, Subcutaneous, Q4H    Physical Exam:  General: A&O x 3, no distress. HEENT: Anicteric sclerae, MMM. Extremities: bilateral foot dressings changed today. Wound stable with necrosis of left great toe tip and posterior aspect of the left second toe, ulceration over the posterior aspect of the right first toe as well. Minimal edema, no erythema or purulence.   Abdomen: Soft, nontender, nondistended. Assessment and Plan:  79 y.o. male with bilateral foot ulcers. US yesterday with proximal arterial disease on the right, more distal disease/vessel occlusion on the left.       Patient Active Problem List:     Chronic coronary artery disease     Automatic implantable cardioverter-defibrillator in situ     Chronic combined systolic and diastolic heart failure (HCC)     Chronic kidney disease, stage III (moderate) ([facility]     Mixed hyperlipidemia     Sick sinus syndrome ([facility])     Type 2 diabetes mellitus with diabetic polyneuropathy ([address]     Spinal stenosis of lumbar region     Obesity, Class I, BMI 30-34.9     Postoperative hypertension     S/P partial colectomy     Tubulovillous adenoma of colon     Microalbuminuria     PVD (peripheral vascular disease) ([facility])     Limb ischemia     Necrotic toes ([facility])     Toe gangrene ([facility])     Diabetic foot infection ([facility])     Chronic kidney disease (CKD) stage G3a/A2, moderately decreased glomerular filtration rate (GFR) between 45-59 mL/min/1.73 square meter and albuminuria creatinine ratio between  mg/g ([facility])     DM (diabetes mellitus) ([address]     Edema     ICD (implantable cardioverter-defibrillator) battery depletion     Hyperkalemia     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[facility])     Ischemia of toe     Acute kidney injury ([address]     Fluid overload     DM (diabetes mellitus) ([address]     Hypertensive emergency     Precordial pain     Acute chest pain     Unstable angina ([facility])     Chronic kidney disease (CKD) stage G3a/A3, moderately decreased glomerular filtration rate (GFR) between 45-59 mL/min/1.73 square meter and albuminuria creatinine ratio greater than 300 mg/g (HCC)     Cardiomyopathy (HCC)     Hypertensive crisis     Diabetic neuropathy ([facility])     HTN (hypertension)     Epigastric pain     Acute on chronic congestive heart failure (HCC)     Bilateral lower extremity edema     Acute on chronic systolic CHF (congestive heart failure) ([address]     Diabetic foot ulcer with osteomyelitis ([facility]     Visit for wound check     Moderate malnutrition ([facility]      - will need bilateral foot debridement   - Ideally revascularization should be done prior to debridement if possible. Without better blood supply I expect difficulty with healing.  - will follow Vascular plans--his feet need debridement but not urgently as there are no systemic signs of infection.   - will continue to follow      Donaldo Jimenez MD

## 2021-06-10 NOTE — PLAN OF CARE
Problem: Pain:  Goal: Pain level will decrease  Description: Pain level will decrease  Outcome: Ongoing  Goal: Control of acute pain  Description: Control of acute pain  Outcome: Ongoing  Goal: Control of chronic pain  Description: Control of chronic pain  Outcome: Ongoing     Problem: Skin Integrity:  Goal: Will show no infection signs and symptoms  Description: Will show no infection signs and symptoms  Outcome: Ongoing  Goal: Absence of new skin breakdown  Description: Absence of new skin breakdown  Outcome: Ongoing     Problem: Nutrition  Goal: Optimal nutrition therapy  Outcome: Ongoing

## 2021-06-10 NOTE — PROGRESS NOTES
Cardiology Progress Note     Today's Plan continue present management    Admit Date:  6/7/2021    Consult reason/ Seen today for: Preop cardiac assessment    Subjective and  Overnight Events: appears depressed today- notes pain to feet. Denies chest pain      Assessment / Plan / Recommendation:     1. CAD; Stable, denies chest pain. Continue current medications  2. Peripheral Vascular disease:- Moderate stenosis within the proximal SFA and femoral artery on the right leg. Three vessel runoff noted to the right foot. No severe stenosis seen within the left lower extremity. CTs to assess for angiogram   3. HFrEF chronic -Does not appear to be decompensated. Diuresis  as needed   4. NANCY - jump in creatinine: nephrology consulted-angio on hold  5. Bilateral foot wounds- non healing- ID following         History of Presenting Illness:    Chief complain on admission : 79 y. o.year old who is admitted for  Chief Complaint   Patient presents with    Wound Check     bilateral feet; states infection to amputation sites on bilateral feet, maggots        Past medical history:    has a past medical history of Acid reflux, Acute MI ([address] Arthritis, Broken teeth, CAD (coronary artery disease), Cardiomyopathy ([address] CHF (congestive heart failure) ([address] Chronic back pain, Chronic kidney disease, Diabetes mellitus ([address] Diabetic neuropathy ([address] H/O cardiovascular stress test, H/O Doppler ultrasound, H/O percutaneous left heart catheterization, History of irregular heartbeat, History of syncope, Hyperlipidemia, Hypertension, Leg swelling, Necrotic toes (HCC), Neuropathy, neuropathy, PAD (peripheral artery disease) ([address] PVD (peripheral vascular disease) ([address] Sick sinus syndrome ([address] Sleep apnea, Spinal stenosis, Teeth missing, and Type 2 diabetes mellitus without complication ([address]   Past surgical history:   has a past surgical history that pulses of left foot due to wound dressing. Right foot pulses unable to be assesses by palpating). Radial pulses are 2+ on the right side and 2+ on the left side. Heart sounds: S1 normal and S2 normal. No murmur heard. Pulmonary:      Effort: Pulmonary effort is normal.      Breath sounds: No rhonchi. Musculoskeletal:         General: Tenderness present. Right lower leg: Edema present. Left lower leg: Edema present. Skin:     General: Skin is warm and dry. Comments: Wounds of both feet in dressings. Dry, rough skin noted from mid tibia and distally   Neurological:      General: No focal deficit present. Mental Status: He is alert and oriented to person, place, and time. Psychiatric:         Mood and Affect: Mood is depressed. Behavior: Behavior normal. Behavior is cooperative.           Medications:    vancomycin  1,250 mg Intravenous Q24H    amLODIPine  10 mg Oral Daily    pantoprazole  40 mg Oral QAM AC    meropenem  1,000 mg Intravenous Q8H    atorvastatin  40 mg Oral Nightly    carvedilol  3.125 mg Oral BID    [Held by provider] clopidogrel  75 mg Oral Daily    dicyclomine  10 mg Oral 4x Daily AC & HS    doxazosin  4 mg Oral BID    pregabalin  150 mg Oral TID    sodium chloride flush  5-40 mL Intravenous BID    insulin lispro  0-12 Units Subcutaneous Q4H      sodium chloride Stopped (06/10/21 0109)    dextrose       hydrALAZINE (APRESOLINE) ivpb, albuterol sulfate HFA, sodium chloride flush, sodium chloride, ondansetron **OR** ondansetron, polyethylene glycol, acetaminophen **OR** acetaminophen, glucose, dextrose, glucagon (rDNA), dextrose, morphine **OR** morphine    Lab Data:  CBC:   Recent Labs     06/07/21 1939 06/08/21  0830   WBC 7.1 5.4   HGB 10.9* 10.0*   HCT 34.7* 31.9*   MCV 91.8 91.1    230     BMP:   Recent Labs     06/07/21 1939 06/08/21  0830 06/10/21  0756    138 138   K 4.2 4.5 4.2    105 102   CO2 28 25 28 PHOS  --   --  3.7   BUN 9 8 14   CREATININE 1.4* 1.4* 2.3*     PT/INR:   Recent Labs     06/08/21  0830   PROTIME 13.8   INR 1.14     BNP:    Recent Labs     06/07/21  1939   PROBNP 4,762*     TROPONIN:   Recent Labs     06/08/21  0830 06/08/21  1241 06/08/21 2001   TROPONINT 0.031* 0.033* 0.033*              Impression:  Principal Problem:    Diabetic foot ulcer with osteomyelitis ([facility] [address]  Active Problems:    Visit for wound check    Moderate malnutrition ([facility] [address]  Resolved Problems:    * No resolved hospital problems. *       All labs, medications and tests reviewed by myself, continue all other medications of all above medical condition listed as is except for changes mentioned above. Thank you   Please call with questions. Electronically signed by MICKY Laguerre CNP on 6/10/2021 at 1:20 PM     I have seen ,spoken to  and examined this patient personally, independently of the nurse practitioner. I have reviewed the hospital care given to date and reviewed all pertinent labs and imaging. The plan was developed mutually at the time of the visit with the patient,  NP  and myself. I have spoken with patient, nursing staff and provided written and verbal instructions . The above note has been reviewed and I agree with the assessment, diagnosis, and treatment plan with changes made by me as follows     CARDIOLOGY ATTENDING ADDENDUM    HPI:  I have reviewed the above HPI  And agree with above   Edwar Harkins is a 79 y. o.year old who and presents with had concerns including Wound Check (bilateral feet; states infection to amputation sites on bilateral feet, maggots).   Chief Complaint   Patient presents with    Wound Check     bilateral feet; states infection to amputation sites on bilateral feet, maggots     Interval history:  Le cpain    Physical Exam:  General:  Awake, alert, NAD  Head:normal  Eye:normal  Neck:  No JVD   Chest:  Clear to auscultation, respiration easy  Cardiovascular:  RRR S1S2  Abdomen: nontender  Extremities:  tr edema  Pulses; palpable  Neuro: grossly normal      MEDICAL DECISION MAKING;    I agree with the above plan, which was planned by myself and discussed with NP.   Can proceed with surgery  Revasc per Abdon Tony MD [facility]

## 2021-06-10 NOTE — PROGRESS NOTES
Nephrology Progress Note  6/10/2021 9:06 AM        Subjective:   Admit Date: 6/7/2021  PCP: Manju Lira    Interval History: had angio of LE - uneventfully     Diet: better    ROS:  No sob   uop 1500/d       Data:     Current meds:    vancomycin  1,500 mg Intravenous Q24H    amLODIPine  10 mg Oral Daily    pantoprazole  40 mg Oral QAM AC    meropenem  1,000 mg Intravenous Q8H    atorvastatin  40 mg Oral Nightly    carvedilol  3.125 mg Oral BID    [Held by provider] clopidogrel  75 mg Oral Daily    dicyclomine  10 mg Oral 4x Daily AC & HS    doxazosin  4 mg Oral BID    pregabalin  150 mg Oral TID    torsemide  100 mg Oral BID    sodium chloride flush  5-40 mL Intravenous BID    insulin lispro  0-12 Units Subcutaneous Q4H      sodium chloride Stopped (06/10/21 0109)    dextrose           I/O last 3 completed shifts: In: 635 [I.V.:35; IV Piggyback:600]  Out: 1500 [Urine:1500]    CBC:   Recent Labs     06/07/21  1939 06/08/21  0830   WBC 7.1 5.4   HGB 10.9* 10.0*    230          Recent Labs     06/07/21  1939 06/08/21  0830    138   K 4.2 4.5    105   CO2 28 25   BUN 9 8   CREATININE 1.4* 1.4*   GLUCOSE 213* 141*       Lab Results   Component Value Date    CALCIUM 8.2 (L) 06/08/2021    PHOS 3.1 12/23/2020       Objective:     Vitals: BP (!) 106/51   Pulse 59   Temp 98.4 °F (36.9 °C) (Oral)   Resp 18   Ht 6' (1.829 m)   Wt 260 lb (117.9 kg)   SpO2 (!) 88%   BMI 35.26 kg/m²     General appearance:  No ac distress   HEENT:  + conj pallor  Neck:  supple  Lungs:  + rhonchi  Heart:  RRR  Abdomen: soft  Extremities:  ++ edema of LE - toe / gangrene         Problem List :         Impression :     1. CKD stage 3 a/ B A3 - cr was 1.4 - but had angio will recheck   2. Sever ASCVD probably involving pres sty much all vascular bed due to DM/proteinuria ( endothelial damage )   3. HTN    Recommendation/Plan  :     1. Lab in am   2. Manual BP  3. Ok to keep loop   4.  Low

## 2021-06-10 NOTE — PROGRESS NOTES
Patient had a syncope at bedside. He was sitting in bed, stood up for about 10 seconds, after which he passed out. Discussed with patient at bedside, reports that he has had this happen about 4-5 times this month alone. He recently had his AICD interrogated with no significant abnormalities noted. Will check orthostatic vitals. Will update echocardiogram.  Will give small bolus of normal saline (250 cc total). Plan of care discussed with nursing staff, patient at bedside.       SIGNED: Dianne Kaiser MD, MD . 6/10/2021

## 2021-06-10 NOTE — PROGRESS NOTES
Hospitalist Progress Note      Name:  Woo Bingham /Age/Sex: 1950  (79 y.o. male)   MRN & CSN:  [id number] & [id number] Admission Date/Time: 2021  7:58 PM   Location:  [address] [place] [address] PCP: Lalitha Vela Day: 4    Assessment and Plan:   Woo Bingham is a 79 y.o.  male diabetes, CAD, s/p stent, PAD, ischemic cardiomyopathy s/p ICD, CKD 3A, hypertension, chronic lymphedema, nicotine dependence who presents with Diabetic foot ulcer with osteomyelitis ([facility] [address] [address]       1. Acute kidney injury superimposed on CKD 3: Not POA  Creatinine up to 2.3 today. 1.4 at baseline. Likely prerenal versus intrinsic renal  Adjust vancomycin dose. Hold diuretics  Urine studies per nephrology  Appreciate prompt evaluation    2. Infected left diabetic foot ulcer: With concern for underlying osteomyelitis  Plans for surgical debridement/amputation on hold pending vascular clearance  Angiogram plans noted this morning but aborted due to NANCY on CKD  IV antibiotics vancomycin and meropenem. Pain control adequate:    3. Severe PAD s/p previous intervention, s/p amputation right third toe, right fifth toe. Vascular and general surgery following. Plavix on hold for surgery. 5.  Moderate malnutrition: In the context of chronic illness  Continue supplementation    6. Hypertension: Continue Coreg 3.125 mg twice daily, Cardura 4 mg twice daily, amlodipine 10 mg daily    7. 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 Continue Lyrica 150 mg 3 times daily  8. Chronic systolic CHF: Stable  Elevated proBNP is likely due to CKD  9. Dyslipidemia: Continue Lipitor 40 mg daily. >50% of encounter time spent in counseling and coordination of care.     Diet Diet NPO for possible surgery   DVT Prophylaxis [] Lovenox, []  Heparin, [] SCDs, [] Ambulation subcu Lovenox on hold for possible surgery   GI Prophylaxis [x] PPI,  [] H2 Blocker,  [] Carafate,  [] Diet/Tube Feeds   Code Status Full Code   Disposition Patient requires continued admission due to  diabetic foot ulcer with gangrene-we will likely need amputation   MDM [] Low, [] Moderate,[x]  High  Patient's risk as above due to diabetic foot ulcer with gangrene and possible osteo-     History of Present Illness:     Chief Complaint: Diabetic foot ulcer with osteomyelitis ([facility] [address]  Yuridia Mayen is a 79 y.o.  male is seen and examined, pain is better controlled today. Has no other complaints. He reports that he would like to get well before he discharges from the hospital.  He is appreciative of all the consults. Denies fevers or chills. He occasionally has night sweats. Objective: Intake/Output Summary (Last 24 hours) at 6/10/2021 1205  Last data filed at 6/10/2021 0910  Gross per 24 hour   Intake 635 ml   Output 2100 ml   Net -1465 ml      Vitals:   Vitals:    06/10/21 0910   BP: 131/61   Pulse: 61   Resp: 18   Temp:    SpO2: 95%     Physical Exam:   GEN Awake male, sitting upright in bed in no apparent distress. Appears given age. EYES Pupils are equally round. No scleral erythema, discharge, or conjunctivitis. HENT Mucous membranes are moist.   NECK Supple, no apparent thyromegaly or masses. RESP Clear to auscultation, no wheezes, rales or rhonchi. Symmetric chest movement while on room air. CARDIO/VASC: S1/S2 auscultated. RRR. ICD in upper chest  GI: Abdomen is soft without significant tenderness, masses, or guarding. Bowel sounds are normoactive. Rectal exam deferred. : No costovertebral angle tenderness. Normal appearing external genitalia. MSK:Charcot's deformity both ankles, lymphedematous lower extremity, s/p left proximal phalanx amputation, right third and fifth toe amputation  SKIN: bilateral lower extremity with atrophic changes, healed ulceration  NEURO: Cranial nerves appear grossly intact, normal speech, no lateralizing weakness. PSYCH Awake, alert, oriented x 4.   Affect appropriate.     Medications:   Medications:    vancomycin  1,250 mg Intravenous Q24H    amLODIPine  10 mg Oral Daily    pantoprazole  40 mg Oral QAM AC    meropenem  1,000 mg Intravenous Q8H    atorvastatin  40 mg Oral Nightly    carvedilol  3.125 mg Oral BID    [Held by provider] clopidogrel  75 mg Oral Daily    dicyclomine  10 mg Oral 4x Daily AC & HS    doxazosin  4 mg Oral BID    pregabalin  150 mg Oral TID    sodium chloride flush  5-40 mL Intravenous BID    insulin lispro  0-12 Units Subcutaneous Q4H      Infusions:    sodium chloride Stopped (06/10/21 0109)    dextrose       PRN Meds: hydrALAZINE (APRESOLINE) ivpb, 10 mg, Q6H PRN  albuterol sulfate HFA, 2 puff, Q4H PRN  sodium chloride flush, 5-40 mL, PRN  sodium chloride, 25 mL, PRN  ondansetron, 4 mg, Q8H PRN   Or  ondansetron, 4 mg, Q6H PRN  polyethylene glycol, 17 g, Daily PRN  acetaminophen, 650 mg, Q6H PRN   Or  acetaminophen, 650 mg, Q6H PRN  glucose, 15 g, PRN  dextrose, 12.5 g, PRN  glucagon (rDNA), 1 mg, PRN  dextrose, 100 mL/hr, PRN  morphine, 2 mg, Q2H PRN   Or  morphine, 4 mg, Q2H PRN          Electronically signed by Kyle Tello MD on 6/10/2021 at 12:05 PM

## 2021-06-10 NOTE — PROGRESS NOTES
Pt while standing became non-resposive, for a few seconds pt's etyes were reactive to my finger aproaching them. Dr Alessia Hwang notified.

## 2021-06-10 NOTE — PROGRESS NOTES
[address] [facility]  consulted by Dr. Mckenzie Freed for monitoring and adjustment. Indication for treatment: DFU, possible osteomyelitis  Goal trough: 15 mcg/mL  AUC/RJ: 400-600    Pertinent Laboratory Values:   Temp Readings from Last 3 Encounters:   06/10/21 98.4 °F (36.9 °C) (Oral)   03/11/21 97.8 °F (36.6 °C) (Oral)   01/06/21 98.3 °F (36.8 °C)     Recent Labs     06/07/21  1939 06/08/21  0830   WBC 7.1 5.4   LACTATE 0.8  --      Recent Labs     06/07/21  1939 06/08/21  0830   BUN 9 8   CREATININE 1.4* 1.4*     Estimated Creatinine Clearance: 65 mL/min (A) (based on SCr of 1.4 mg/dL (H)).     Intake/Output Summary (Last 24 hours) at 6/10/2021 3260  Last data filed at 6/10/2021 0109  Gross per 24 hour   Intake 635 ml   Output 1500 ml   Net -865 ml       Pertinent Cultures:  Date    Source    Results  06/07   Blood    NGTD  06/07   Wound    Proteus mirabilis, MRSA, Corynebacterium striatum    Assessment:  · No new WBC; afebrile  · History of CKD stage III  · Scr stable @ 1.4  · Scr ordered daily x2  · Day(s) of therapy: 3  · MRI pending, r/o osteomyelitis  · Vancomycin concentration:   · 06/09 - 15.6, therapeutic following 2 doses    Plan:  · Level collected after x2 doses  · Anticipate level ~20 at steady-state (>4 doses) and estimated AUC/RJ >600  · Decrease dose to 1250 mg q24h  · Predicted trough: ~14  · Predicted AUC/RJ: 511  · Repeat vancomycin level in 48h  · Renal labs ordered daily x2, continue to follow closely while ordered on vancomycin  · Pharmacy will continue to monitor patient and adjust therapy as indicated    [address] 06/11/2021 @ 2030    Thank you for the consult,  Viri Monaco, [facility]  6/10/2021 8:52 AM

## 2021-06-10 NOTE — PROGRESS NOTES
PATIENT NAME: Mckenna Mei    TODAY'S DATE: 06/10/21    Reason for today's visit: PAD    SUBJECTIVE:    Pt is feeling well. OBJECTIVE:   VITALS:    Vitals:    06/10/21 0910   BP: 131/61   Pulse: 61   Resp: 18   Temp:    SpO2: 95%     INTAKE/OUTPUT:    Date 06/10/21 0000 - 06/10/21 2359   Shift 1639-0938 2503-1480 1624-7117 24 Hour Total   INTAKE   I.V.(mL/kg/hr) 25(0)   25   IV Piggyback 600   600   Shift Total(mL/kg) 625(5.3)   625(5.3)   OUTPUT   Urine(mL/kg/hr) 900(1) 600  1500   Shift Total(mL/kg) 900(7.6) 600(5.1)  1500(12.7)   Weight (kg) 117.9 117.9 117.9 117.9      Patient Vitals for the past 96 hrs (Last 3 readings):   Weight   06/07/21 1715 260 lb (117.9 kg)       EXAM:  Constitutional: Blood pressure 131/61, pulse 61, temperature 98.4 °F (36.9 °C), temperature source Oral, resp. rate 18, height 6' (1.829 m), weight 260 lb (117.9 kg), SpO2 95 %. No apparent distress, appears stated age and cooperative. Neurologic: follows commands, no focal weakness noted   Lungs: Good respiratory effort.  Clear to auscultation,   CV: Regular rate/ rhythm , no peripheral edema, feet warm   GI: Soft, non-tender in all four quadrants, non-distended, + bowel sounds, spleen and liver no palpable masses   : bladder nondistended   MSK: no obvious deformity   Skin: warm, pink and dry bilat feet wrapped, wound photos reviewed      Data:  CBC:   Recent Labs     06/07/21 1939 06/08/21  0830   WBC 7.1 5.4   HGB 10.9* 10.0*   HCT 34.7* 31.9*    230     BMP:    Recent Labs     06/07/21 1939 06/08/21  0830 06/10/21  0756    138 138   K 4.2 4.5 4.2    105 102   CO2 28 25 28   BUN 9 8 14   CREATININE 1.4* 1.4* 2.3*   GLUCOSE 213* 141* 93     Hepatic:   Recent Labs     06/07/21  1939 06/08/21  0830 06/10/21  0756   AST 8* 9* 9*   ALT 6* <5* <5*   BILITOT 0.6 0.7 0.9   ALKPHOS 73 63 63     Mag:      Recent Labs     06/10/21  0756   MG 1.8      Phos:     Recent Labs     06/10/21  0756   PHOS 3.7      INR: Recent Labs     06/08/21  0830   INR 1.14       Radiology Review      ASSESSMENT:  Patient Active Problem List   Diagnosis    Chronic coronary artery disease    Automatic implantable cardioverter-defibrillator in situ    Chronic combined systolic and diastolic heart failure (HCC)    Chronic kidney disease, stage III (moderate) ([facility])    Mixed hyperlipidemia    Sick sinus syndrome ([address]    Type 2 diabetes mellitus with diabetic polyneuropathy ([address]    Spinal stenosis of lumbar region    Obesity, Class I, BMI 30-34.9    Postoperative hypertension    S/P partial colectomy    Tubulovillous adenoma of colon    Microalbuminuria    PVD (peripheral vascular disease) ([address]    Limb ischemia    Necrotic toes ([facility])    Toe gangrene ([address]    Diabetic foot infection ([address]    Chronic kidney disease (CKD) stage G3a/A2, moderately decreased glomerular filtration rate (GFR) between 45-59 mL/min/1.73 square meter and albuminuria creatinine ratio between  mg/g ([facility])    DM (diabetes mellitus) ([address]    Edema    ICD (implantable cardioverter-defibrillator) battery depletion    Hyperkalemia    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[facility])    Ischemia of toe    Acute kidney injury ([address]    Fluid overload    DM (diabetes mellitus) ([address]    Hypertensive emergency    Precordial pain    Acute chest pain    Unstable angina (HCC)    Chronic kidney disease (CKD) stage G3a/A3, moderately decreased glomerular filtration rate (GFR) between 45-59 mL/min/1.73 square meter and albuminuria creatinine ratio greater than 300 mg/g (HCC)    Cardiomyopathy (HCC)    Hypertensive crisis    Diabetic neuropathy ([address]    HTN (hypertension)    Epigastric pain    Acute on chronic congestive heart failure (HCC)    Bilateral lower extremity edema    Acute on chronic systolic CHF (congestive heart failure) (HCC)    Diabetic foot ulcer with osteomyelitis ([address]    Visit for wound check    Moderate malnutrition ([address]       PAD    PLAN: Creatinine jumped up to 2.3 from baseline of 1.4 today. Angiogram cancelled. Will discuss with Dr. Jeannette Briscoe regarding timing. If he is ready for DC over the weekend he can go home and we can plan on outpt angio in the near future.      Dee Dennis PA-C

## 2021-06-11 LAB
ALBUMIN SERPL-MCNC: 2.7 GM/DL (ref 3.4–5)
ALP BLD-CCNC: 66 IU/L (ref 40–128)
ALT SERPL-CCNC: <5 U/L (ref 10–40)
ANION GAP SERPL CALCULATED.3IONS-SCNC: 9 MMOL/L (ref 4–16)
AST SERPL-CCNC: 10 IU/L (ref 15–37)
BACTERIA: ABNORMAL /HPF
BILIRUB SERPL-MCNC: 0.8 MG/DL (ref 0–1)
BILIRUBIN URINE: NEGATIVE MG/DL
BLOOD, URINE: ABNORMAL
BUN BLDV-MCNC: 20 MG/DL (ref 6–23)
CALCIUM SERPL-MCNC: 7.6 MG/DL (ref 8.3–10.6)
CHLORIDE BLD-SCNC: 104 MMOL/L (ref 99–110)
CLARITY: CLEAR
CO2: 27 MMOL/L (ref 21–32)
COLOR: ABNORMAL
CREAT SERPL-MCNC: 2.2 MG/DL (ref 0.9–1.3)
CREAT SERPL-MCNC: 2.3 MG/DL (ref 0.9–1.3)
DOSE AMOUNT: NORMAL
DOSE TIME: NORMAL
GFR AFRICAN AMERICAN: 34 ML/MIN/1.73M2
GFR AFRICAN AMERICAN: 36 ML/MIN/1.73M2
GFR NON-AFRICAN AMERICAN: 28 ML/MIN/1.73M2
GFR NON-AFRICAN AMERICAN: 30 ML/MIN/1.73M2
GLUCOSE BLD-MCNC: 119 MG/DL (ref 70–99)
GLUCOSE BLD-MCNC: 161 MG/DL (ref 70–99)
GLUCOSE BLD-MCNC: 187 MG/DL (ref 70–99)
GLUCOSE BLD-MCNC: 199 MG/DL (ref 70–99)
GLUCOSE BLD-MCNC: 260 MG/DL (ref 70–99)
GLUCOSE BLD-MCNC: 88 MG/DL (ref 70–99)
GLUCOSE BLD-MCNC: 97 MG/DL (ref 70–99)
GLUCOSE, URINE: NEGATIVE MG/DL
HIGH SENSITIVE C-REACTIVE PROTEIN: 37.7 MG/L
KETONES, URINE: NEGATIVE MG/DL
LEUKOCYTE ESTERASE, URINE: NEGATIVE
LV EF: 48 %
LVEF MODALITY: NORMAL
NITRITE URINE, QUANTITATIVE: NEGATIVE
PH, URINE: 5 (ref 5–8)
POTASSIUM SERPL-SCNC: 4.4 MMOL/L (ref 3.5–5.1)
PROTEIN UA: NEGATIVE MG/DL
RBC URINE: 1 /HPF (ref 0–3)
SODIUM BLD-SCNC: 140 MMOL/L (ref 135–145)
SPECIFIC GRAVITY UA: 1 (ref 1–1.03)
SQUAMOUS EPITHELIAL: <1 /HPF
TOTAL PROTEIN: 5.4 GM/DL (ref 6.4–8.2)
TRICHOMONAS: ABNORMAL /HPF
UROBILINOGEN, URINE: NEGATIVE MG/DL (ref 0.2–1)
VANCOMYCIN TROUGH: 16.1 UG/ML (ref 10–20)
WBC UA: <1 /HPF (ref 0–2)

## 2021-06-11 PROCEDURE — 6370000000 HC RX 637 (ALT 250 FOR IP): Performed by: INTERNAL MEDICINE

## 2021-06-11 PROCEDURE — 6370000000 HC RX 637 (ALT 250 FOR IP): Performed by: STUDENT IN AN ORGANIZED HEALTH CARE EDUCATION/TRAINING PROGRAM

## 2021-06-11 PROCEDURE — 2580000003 HC RX 258: Performed by: INTERNAL MEDICINE

## 2021-06-11 PROCEDURE — 1200000000 HC SEMI PRIVATE

## 2021-06-11 PROCEDURE — 82565 ASSAY OF CREATININE: CPT

## 2021-06-11 PROCEDURE — 99232 SBSQ HOSP IP/OBS MODERATE 35: CPT | Performed by: SURGERY

## 2021-06-11 PROCEDURE — 86141 C-REACTIVE PROTEIN HS: CPT

## 2021-06-11 PROCEDURE — 80053 COMPREHEN METABOLIC PANEL: CPT

## 2021-06-11 PROCEDURE — 6360000002 HC RX W HCPCS: Performed by: INTERNAL MEDICINE

## 2021-06-11 PROCEDURE — 6360000002 HC RX W HCPCS: Performed by: STUDENT IN AN ORGANIZED HEALTH CARE EDUCATION/TRAINING PROGRAM

## 2021-06-11 PROCEDURE — 99233 SBSQ HOSP IP/OBS HIGH 50: CPT | Performed by: NURSE PRACTITIONER

## 2021-06-11 PROCEDURE — 36415 COLL VENOUS BLD VENIPUNCTURE: CPT

## 2021-06-11 PROCEDURE — 80202 ASSAY OF VANCOMYCIN: CPT

## 2021-06-11 PROCEDURE — 81001 URINALYSIS AUTO W/SCOPE: CPT

## 2021-06-11 PROCEDURE — 94761 N-INVAS EAR/PLS OXIMETRY MLT: CPT

## 2021-06-11 PROCEDURE — 2580000003 HC RX 258: Performed by: STUDENT IN AN ORGANIZED HEALTH CARE EDUCATION/TRAINING PROGRAM

## 2021-06-11 PROCEDURE — 82962 GLUCOSE BLOOD TEST: CPT

## 2021-06-11 PROCEDURE — 94150 VITAL CAPACITY TEST: CPT

## 2021-06-11 PROCEDURE — 93306 TTE W/DOPPLER COMPLETE: CPT

## 2021-06-11 RX ORDER — SODIUM CHLORIDE 9 MG/ML
INJECTION, SOLUTION INTRAVENOUS CONTINUOUS
Status: DISCONTINUED | OUTPATIENT
Start: 2021-06-11 | End: 2021-06-14

## 2021-06-11 RX ADMIN — DICYCLOMINE HYDROCHLORIDE 10 MG: 10 CAPSULE ORAL at 16:33

## 2021-06-11 RX ADMIN — PREGABALIN 150 MG: 75 CAPSULE ORAL at 10:05

## 2021-06-11 RX ADMIN — MORPHINE SULFATE 2 MG: 4 INJECTION, SOLUTION INTRAMUSCULAR; INTRAVENOUS at 12:02

## 2021-06-11 RX ADMIN — AMLODIPINE BESYLATE 10 MG: 10 TABLET ORAL at 10:05

## 2021-06-11 RX ADMIN — CEFTRIAXONE SODIUM 2000 MG: 2 INJECTION, POWDER, FOR SOLUTION INTRAMUSCULAR; INTRAVENOUS at 16:32

## 2021-06-11 RX ADMIN — ATORVASTATIN CALCIUM 40 MG: 40 TABLET, FILM COATED ORAL at 20:56

## 2021-06-11 RX ADMIN — INSULIN LISPRO 6 UNITS: 100 INJECTION, SOLUTION INTRAVENOUS; SUBCUTANEOUS at 20:16

## 2021-06-11 RX ADMIN — MORPHINE SULFATE 4 MG: 4 INJECTION, SOLUTION INTRAMUSCULAR; INTRAVENOUS at 21:00

## 2021-06-11 RX ADMIN — PREGABALIN 150 MG: 75 CAPSULE ORAL at 20:57

## 2021-06-11 RX ADMIN — INSULIN LISPRO 2 UNITS: 100 INJECTION, SOLUTION INTRAVENOUS; SUBCUTANEOUS at 16:38

## 2021-06-11 RX ADMIN — CARVEDILOL 3.12 MG: 3.12 TABLET, FILM COATED ORAL at 10:05

## 2021-06-11 RX ADMIN — DICYCLOMINE HYDROCHLORIDE 10 MG: 10 CAPSULE ORAL at 06:29

## 2021-06-11 RX ADMIN — SODIUM CHLORIDE, PRESERVATIVE FREE 10 ML: 5 INJECTION INTRAVENOUS at 10:06

## 2021-06-11 RX ADMIN — DICYCLOMINE HYDROCHLORIDE 10 MG: 10 CAPSULE ORAL at 20:56

## 2021-06-11 RX ADMIN — DICYCLOMINE HYDROCHLORIDE 10 MG: 10 CAPSULE ORAL at 10:05

## 2021-06-11 RX ADMIN — CARVEDILOL 3.12 MG: 3.12 TABLET, FILM COATED ORAL at 20:56

## 2021-06-11 RX ADMIN — PANTOPRAZOLE SODIUM 40 MG: 40 TABLET, DELAYED RELEASE ORAL at 06:29

## 2021-06-11 RX ADMIN — INSULIN LISPRO 2 UNITS: 100 INJECTION, SOLUTION INTRAVENOUS; SUBCUTANEOUS at 11:54

## 2021-06-11 RX ADMIN — PREGABALIN 150 MG: 75 CAPSULE ORAL at 14:03

## 2021-06-11 RX ADMIN — SODIUM CHLORIDE, PRESERVATIVE FREE 10 ML: 5 INJECTION INTRAVENOUS at 20:56

## 2021-06-11 RX ADMIN — SODIUM CHLORIDE: 9 INJECTION, SOLUTION INTRAVENOUS at 11:58

## 2021-06-11 RX ADMIN — INSULIN LISPRO 2 UNITS: 100 INJECTION, SOLUTION INTRAVENOUS; SUBCUTANEOUS at 00:25

## 2021-06-11 ASSESSMENT — PAIN DESCRIPTION - LOCATION
LOCATION: FOOT;TOE (COMMENT WHICH ONE)
LOCATION: FOOT

## 2021-06-11 ASSESSMENT — PAIN DESCRIPTION - ORIENTATION
ORIENTATION: RIGHT
ORIENTATION: RIGHT;LEFT

## 2021-06-11 ASSESSMENT — PAIN SCALES - GENERAL
PAINLEVEL_OUTOF10: 7
PAINLEVEL_OUTOF10: 0
PAINLEVEL_OUTOF10: 9
PAINLEVEL_OUTOF10: 7
PAINLEVEL_OUTOF10: 0
PAINLEVEL_OUTOF10: 4

## 2021-06-11 ASSESSMENT — PAIN DESCRIPTION - FREQUENCY
FREQUENCY: CONTINUOUS
FREQUENCY: CONTINUOUS

## 2021-06-11 ASSESSMENT — PAIN DESCRIPTION - PAIN TYPE
TYPE: ACUTE PAIN
TYPE: ACUTE PAIN

## 2021-06-11 ASSESSMENT — PAIN DESCRIPTION - PROGRESSION
CLINICAL_PROGRESSION: GRADUALLY WORSENING
CLINICAL_PROGRESSION: GRADUALLY IMPROVING

## 2021-06-11 ASSESSMENT — PAIN DESCRIPTION - DESCRIPTORS
DESCRIPTORS: SHOOTING;STABBING
DESCRIPTORS: THROBBING

## 2021-06-11 ASSESSMENT — PAIN DESCRIPTION - ONSET: ONSET: ON-GOING

## 2021-06-11 ASSESSMENT — PAIN - FUNCTIONAL ASSESSMENT: PAIN_FUNCTIONAL_ASSESSMENT: PREVENTS OR INTERFERES SOME ACTIVE ACTIVITIES AND ADLS

## 2021-06-11 NOTE — CONSULTS
Consult completed. Nexiva 20g 1.75 inch catheter inserted via ultrasound in patient's LFA. Brisk blood return noted and catheter flushes with ease. Patient tolerated well. Consult IV/PICC team if patient's needs change.

## 2021-06-11 NOTE — PROGRESS NOTES
Infectious Disease Progress Note  2021   Patient Name: Branden Cunningham : 1950   Impression  · Left Foot Infected DFU, Possible OM:  ? Right Foot DFU:  § Afebrile, no leukocytosis  § -Left Foot wound culture: Proteus mirabilis, MRSA, Corynebacterium striatum  § -Blood cultures 0/2-NGTD  § CRP 37.6, ESR 66  § -XR Foot Left: Ulceration involving the stump of the left great toe. Bony irregularity of the proximal phalangeal stump, raising the possibilility of OM  § -XR Foot Right: no acute periostitis or bone destruction. § -Doppler BLE: no evidence of DVT  § -Duplex: imp of influx disease  § 6/10-MRI Left foot pending  § Dr. Ruslan Frank, general surgery, onboard, imp of may require BKA (unilateral or bilateral) if unable to heal wounds, will follow vascular workup along with ABX regimen, plan for debridement     · IDDM with Peripheral Neuropathy:  § -HbAIC 7.9     ? Allergy to PCN (hives, facial swelling)    ? Syncopal Episode 6/10     ? Tobacco Abuse: Cigar     ? Severe PVD:  § Dr. Karis Elizalde onboard  § -Duplex shows imp of inflow disease  § Plan for angiogram per left radial     ? NANCY on CKD3:  § Dr. Yair Monroy onboard     ? Chest Pain/ Chronic HFrEF/ ICD x 6 years/ CAD:  § Dr. Renee Petersen onboard     ? Morbid Obesity:  BMI: 35.26     ? HLD     ? COPD/ STACI, Oxygen NC PRN at home     · Multi-morbidity: per PMHx:  S/p left hallux amputation     Plan:  · Continue IV vancomycin, per pharmacy dosing,    ? Continue IV ceftriaxone 2 gm q24h  · Trend CRP, ordered  ? Follow with plan for angiogram per Dr. Kortney Whitley, on hold due to NANCY  ? Surgical plans on hold due to NANCY    Ongoing Antimicrobial Therapy  Vancomycin -  Ceftriaxone 6/10? Completed Antimicrobial Therapy  Meropenem -10  ? History:? Interval history noted. Chief complaint: Left foot infected DFU, possible OM. Right foot DFU. Denies n/v/d/f or untoward effects of antibiotics. Resting quietly.      Physical Exam:  Vital Signs: /61   Pulse 63   Temp 97.9 °F (36.6 °C) (Oral)   Resp 18   Ht 6' (1.829 m)   Wt 260 lb (117.9 kg)   SpO2 94%   BMI 35.26 kg/m²     Gen: alert and oriented X3, no distress  Skin: no stigmata of endocarditis  Wounds: C/D/I Right foot: right hallux with dried ulcerations, right 2nd toe dried ulcer. Left foot: Left Hallux amputation stump open wound draining purulent fluid, 2nd toe with ulceration draining purulent fluid. HEMT: AT/NC Oropharynx pink, moist, and without lesions or exudates; dentition in good state of repair  Eyes: PERRLA, EOMI, conjunctiva pink, sclera anicteric. Neck: Supple. Trachea midline. No LAD. Chest: no distress and CTA. Good air movement. Heart: RRR and no MRG. Abd: soft, non-distended, no tenderness, no hepatomegaly. Normoactive bowel sounds. Ext: no clubbing, cyanosis, or edema  Neuro: Mental status intact.  CN 2-12 intact and no focal sensory or motor deficits     Radiologic / Imaging / TESTING  6/7/21 XR Foot Left:  Impression   Ulceration involving the stump of the left great toe.  Bony irregularity of   the proximal phalangeal stump, raising the possibility of osteomyelitis.      6/7/21 XR Foot Right:  Impression   1.  No acute periostitis or bony destruction.      6/7/21 VL Dup Lower Extremity Venous Bilateral:  Impression   No evidence of DVT in either lower extremity above the knee.  Deep veins   below the knee are not visualized due to diffuse subcutaneous edema.      6/8/21 VL Dup Lower Extremity Arteries Bilateral:  Impression   Aortic or iliac inflow disease, with loss of normal appearing triphasic   waveforms at the common femoral arteries.       Moderate stenosis within the proximal SFA on the right, with poststenotic   reduced velocities noted within the remainder of the left lower extremity,   with loss of normal multiphasic waveforms suggesting at least moderate   disease.  No significant focal severe stenosis seen.  Three-vessel runoff to   the right 145 MMOL/L    Potassium 4.4 3.5 - 5.1 MMOL/L    Chloride 104 99 - 110 mMol/L    CO2 27 21 - 32 MMOL/L    BUN 20 6 - 23 MG/DL    CREATININE 2.2 (H) 0.9 - 1.3 MG/DL    Glucose 88 70 - 99 MG/DL    Calcium 7.6 (L) 8.3 - 10.6 MG/DL    Albumin 2.7 (L) 3.4 - 5.0 GM/DL    Total Protein 5.4 (L) 6.4 - 8.2 GM/DL    Total Bilirubin 0.8 0.0 - 1.0 MG/DL    ALT <5 (L) 10 - 40 U/L    AST 10 (L) 15 - 37 IU/L    Alkaline Phosphatase 66 40 - 128 IU/L    GFR Non-African American 30 (L) >60 mL/min/1.73m2    GFR  36 (L) >60 mL/min/1.73m2    Anion Gap 9 4 - 16   C-Reactive Protein    Collection Time: 06/11/21  6:27 AM   Result Value Ref Range    CRP, High Sensitivity 37.7 mg/L   POCT Glucose    Collection Time: 06/11/21  8:03 AM   Result Value Ref Range    POC Glucose 119 (H) 70 - 99 MG/DL   POCT Glucose    Collection Time: 06/11/21 11:18 AM   Result Value Ref Range    POC Glucose 161 (H) 70 - 99 MG/DL     CULTURE results: Invalid input(s): BLOOD CULTURE,  URINE CULTURE, SURGICAL CULTURE    Diagnosis:  Patient Active Problem List   Diagnosis    Chronic coronary artery disease    Automatic implantable cardioverter-defibrillator in situ    Chronic combined systolic and diastolic heart failure ([facility])    Chronic kidney disease, stage III (moderate) ([facility])    Mixed hyperlipidemia    Sick sinus syndrome ([facility])    Type 2 diabetes mellitus with diabetic polyneuropathy [facility]    Spinal stenosis of lumbar region    Obesity, Class I, BMI 30-34.9    Postoperative hypertension    S/P partial colectomy    Tubulovillous adenoma of colon    Microalbuminuria    PVD (peripheral vascular disease) ([facility])    Limb ischemia    Necrotic toes (HCC)    Toe gangrene ([facility])    Diabetic foot infection ([facility])    Chronic kidney disease (CKD) stage G3a/A2, moderately decreased glomerular filtration rate (GFR) between 45-59 mL/min/1.73 square meter and albuminuria creatinine ratio between  mg/g ([facility])    DM (diabetes mellitus) [facility]    Edema    ICD (implantable cardioverter-defibrillator) battery depletion    Hyperkalemia    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HCC)    Ischemia of toe    Acute kidney injury ([address]    Fluid overload    DM (diabetes mellitus) ([address]    Hypertensive emergency    Precordial pain    Acute chest pain    Unstable angina (HCC)    Chronic kidney disease (CKD) stage G3a/A3, moderately decreased glomerular filtration rate (GFR) between 45-59 mL/min/1.73 square meter and albuminuria creatinine ratio greater than 300 mg/g (HCC)    Cardiomyopathy (HCC)    Hypertensive crisis    Diabetic neuropathy (HCC)    HTN (hypertension)    Epigastric pain    Acute on chronic congestive heart failure (HCC)    Bilateral lower extremity edema    Acute on chronic systolic CHF (congestive heart failure) (HCC)    Diabetic foot ulcer with osteomyelitis ([address]    Visit for wound check    Moderate malnutrition ([address]       Active Problems  Principal Problem:    Diabetic foot ulcer with osteomyelitis ([address]  Active Problems:    Visit for wound check    Moderate malnutrition ([address]  Resolved Problems:    * No resolved hospital problems. *    Electronically signed by: Electronically signed by MICKY Herrera CNP on 6/11/2021 at 12:51 PM

## 2021-06-11 NOTE — PROGRESS NOTES
Dyslipidemia: Continue Lipitor 40 mg daily. >50% of encounter time spent in counseling and coordination of care. Diet ADULT DIET; Regular; 4 carb choices (60 gm/meal); Low Potassium (Less than 3000 mg/day); Low Phosphorus (Less than 1000 mg) for possible surgery   DVT Prophylaxis [] Lovenox, []  Heparin, [] SCDs, [] Ambulation subcu Lovenox on hold for possible surgery   GI Prophylaxis [x] PPI,  [] H2 Blocker,  [] Carafate,  [] Diet/Tube Feeds   Code Status Full Code   Disposition Patient requires continued admission due to  diabetic foot ulcer with gangrene-we will likely need amputation   MDM [] Low, [] Moderate,[x]  High  Patient's risk as above due to diabetic foot ulcer with gangrene and possible osteo-     History of Present Illness:     Chief Complaint: Diabetic foot ulcer with osteomyelitis ([address]  Ajith Londono is a 79 y.o.  male is seen and examined, has no new complaints. Reports episodes of postural dizziness and passing out in the last month. Objective: Intake/Output Summary (Last 24 hours) at 6/11/2021 1150  Last data filed at 6/11/2021 0651  Gross per 24 hour   Intake 510 ml   Output 3675 ml   Net -3165 ml      Vitals:   Vitals:    06/11/21 1000   BP: 135/62   Pulse: 63   Resp: 18   Temp: 97.9 °F (36.6 °C)   SpO2: 94%     Physical Exam:   GEN Awake male, sitting upright in bed in no apparent distress. Appears dated age. HEENT: PERRLA, mucous membranes are moist.   RESP: Clear to auscultation, no wheezes, rales or rhonchi. Symmetric chest movement while on room air. CARDIO/VASC: S1/S2 auscultated. RRR. ICD in upper chest  GI: Abdomen is soft, nontender, no organomegaly.   BS normal.  MSK:Charcot's deformity both ankles, lymphedematous lower extremity, s/p left proximal phalanx amputation, right third and fifth toe amputation  SKIN: bilateral lower extremity with atrophic changes, healed ulceration  NEURO: Cranial nerves appear grossly intact, normal speech, no lateralizing weakness. PSYCH Awake, alert, oriented x 4. Affect appropriate.     Medications:   Medications:    vancomycin  1,250 mg Intravenous Q24H    cefTRIAXone (ROCEPHIN) IV  2,000 mg Intravenous Q24H    amLODIPine  10 mg Oral Daily    pantoprazole  40 mg Oral QAM AC    atorvastatin  40 mg Oral Nightly    carvedilol  3.125 mg Oral BID    [Held by provider] clopidogrel  75 mg Oral Daily    dicyclomine  10 mg Oral 4x Daily AC & HS    pregabalin  150 mg Oral TID    sodium chloride flush  5-40 mL Intravenous BID    insulin lispro  0-12 Units Subcutaneous Q4H      Infusions:    sodium chloride      sodium chloride Stopped (06/10/21 0109)    dextrose       PRN Meds: hydrALAZINE (APRESOLINE) ivpb, 10 mg, Q6H PRN  albuterol sulfate HFA, 2 puff, Q4H PRN  sodium chloride flush, 5-40 mL, PRN  sodium chloride, 25 mL, PRN  ondansetron, 4 mg, Q8H PRN   Or  ondansetron, 4 mg, Q6H PRN  polyethylene glycol, 17 g, Daily PRN  acetaminophen, 650 mg, Q6H PRN   Or  acetaminophen, 650 mg, Q6H PRN  glucose, 15 g, PRN  dextrose, 12.5 g, PRN  glucagon (rDNA), 1 mg, PRN  dextrose, 100 mL/hr, PRN  morphine, 2 mg, Q2H PRN   Or  morphine, 4 mg, Q2H PRN          Electronically signed by Nazanin Navarro MD on 6/11/2021 at 11:50 AM

## 2021-06-11 NOTE — PROGRESS NOTES
me  - 14-/80 - I suspect  with alpha blocker was low while standing  4. CMP - his LVEF 45 % no clinical evidence of pulm edema yet -watch     Recommendation/Plan  :     1. D/C alpha blocker  2. Keep holding loop but keep an eye for pulm edema   3. Ok with peripheral edema for now   4. At some point good standing BP after 2 min with HR   5. He is with CCB  6. Also d/C any non essential med's to eliminate any confusion  7. He is with abx   8.  Follow clinically and bio chemically        Justino Pena MD MD

## 2021-06-11 NOTE — PROGRESS NOTES
[address] [facility]  consulted by Dr. Josiane Samuels for monitoring and adjustment. Indication for treatment: DFU, possible osteomyelitis  Goal trough: 15 mcg/mL  AUC/RJ: 400-600    Pertinent Laboratory Values:   Temp Readings from Last 3 Encounters:   06/11/21 97.8 °F (36.6 °C) (Oral)   03/11/21 97.8 °F (36.6 °C) (Oral)   01/06/21 98.3 °F (36.8 °C)     No results for input(s): WBC, LACTATE in the last 72 hours. Recent Labs     06/10/21  0756 06/11/21  0627   BUN 14 20   CREATININE 2.3* 2.2*     Estimated Creatinine Clearance: 41 mL/min (A) (based on SCr of 2.2 mg/dL (H)).     Intake/Output Summary (Last 24 hours) at 6/11/2021 1532  Last data filed at 6/11/2021 9813  Gross per 24 hour   Intake 510 ml   Output 2775 ml   Net -2265 ml       Pertinent Cultures:  Date    Source    Results  06/07   Blood    NGTD  06/07   Wound    Proteus mirabilis, MRSA, Corynebacterium striatum    Assessment:  · No new WBC; afebrile  · History of CKD stage III, now in NANCY  · Scr trends 1.4 -> 2.3  · Day(s) of therapy: 4  · MRI pending, r/o osteomyelitis  · Vancomycin concentration:   · 06/09 - 15.6, therapeutic following 2 doses    Plan:  · Dose decreased to 1250 mg q24h following therapeutic level after x2 doses  · Predicted trough: ~14  · Predicted AUC/RJ: 511  · Patient now in NANCY, hold vancomycin this evening and obtain a random level tomorrow AM  · Intermittent dosing based on levels while in NANCY  · Pharmacy will continue to monitor patient and adjust therapy as indicated    VANCOMYCIN CONCENTRATION SCHEDULED FOR 06/12/2021 @ 0600    Thank you for the consult,  Hector Topete, [facility]  6/11/2021 3:32 PM

## 2021-06-11 NOTE — PLAN OF CARE
Nutrition Problem #1: Moderate malnutrition, In context of chronic illness  Intervention: Food and/or Nutrient Delivery: Continue Current Diet  Nutritional Goals: Pt will consume at least 50% of meals and supplements

## 2021-06-11 NOTE — PROGRESS NOTES
GENERAL SURGERY PROGRESS NOTE    Justo Magdaleno is a 79 y.o. male with DM, PAD and bilateral foot ulcers left worse than right. Subjective:  Having episodes of syncope. Likely orthostatic hypotension. Appears a little pale when I saw him this afternoon and reports just having an episode of light headedness. Tolerating lunch and mentating ok. Objective:    Vitals: VITALS:  /61   Pulse 63   Temp 97.9 °F (36.6 °C) (Oral)   Resp 18   Ht 6' (1.829 m)   Wt 260 lb (117.9 kg)   SpO2 94%   BMI 35.26 kg/m²     I/O: 06/10 0701 - 06/11 0700  In: 510 [I.V.:10]  Out: 4275 [Urine:4275]    Labs/Imaging Results:   Lab Results   Component Value Date     06/11/2021    K 4.4 06/11/2021    K 5.1 01/10/2018     06/11/2021    CO2 27 06/11/2021    BUN 20 06/11/2021    CREATININE 2.2 06/11/2021    GLUCOSE 88 06/11/2021    CALCIUM 7.6 06/11/2021      Lab Results   Component Value Date    WBC 5.4 06/08/2021    HGB 10.0 (L) 06/08/2021    HCT 31.9 (L) 06/08/2021    MCV 91.1 06/08/2021     06/08/2021       IV Fluids:   sodium chloride Last Rate: 50 mL/hr at 06/11/21 1158    sodium chloride Last Rate: Stopped (06/10/21 0109)    dextrose    Scheduled Meds: vancomycin, 1,250 mg, Intravenous, Q24H    cefTRIAXone (ROCEPHIN) IV, 2,000 mg, Intravenous, Q24H    amLODIPine, 10 mg, Oral, Daily    pantoprazole, 40 mg, Oral, QAM AC    atorvastatin, 40 mg, Oral, Nightly    carvedilol, 3.125 mg, Oral, BID    [Held by provider] clopidogrel, 75 mg, Oral, Daily    dicyclomine, 10 mg, Oral, 4x Daily AC & HS    pregabalin, 150 mg, Oral, TID    sodium chloride flush, 5-40 mL, Intravenous, BID    insulin lispro, 0-12 Units, Subcutaneous, Q4H    Physical Exam:  General: A&O x 3, no distress. HEENT: Anicteric sclerae, MMM. Extremities: bilateral foot dressings in place and clean. Minimal edema, no erythema or purulence. Abdomen: Soft, nontender, nondistended.        Assessment and Plan:  79 y.o. male with bilateral foot ulcers. US with proximal arterial disease on the right, more distal disease/vessel occlusion on the left, likely has inflow disease per Vascular surgery.       Patient Active Problem List:     Chronic coronary artery disease     Automatic implantable cardioverter-defibrillator in situ     Chronic combined systolic and diastolic heart failure ([facility])     Chronic kidney disease, stage III (moderate) ([facility])     Mixed hyperlipidemia     Sick sinus syndrome ([facility])     Type 2 diabetes mellitus with diabetic polyneuropathy ([address]     Spinal stenosis of lumbar region     Obesity, Class I, BMI 30-34.9     Postoperative hypertension     S/P partial colectomy     Tubulovillous adenoma of colon     Microalbuminuria     PVD (peripheral vascular disease) ([facility])     Limb ischemia     Necrotic toes ([facility])     Toe gangrene ([facility])     Diabetic foot infection ([facility])     Chronic kidney disease (CKD) stage G3a/A2, moderately decreased glomerular filtration rate (GFR) between 45-59 mL/min/1.73 square meter and albuminuria creatinine ratio between  mg/g ([facility])     DM (diabetes mellitus) ([address]     Edema     ICD (implantable cardioverter-defibrillator) battery depletion     Hyperkalemia     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[facility])     Ischemia of toe     Acute kidney injury ([address]     Fluid overload     DM (diabetes mellitus) ([address]     Hypertensive emergency     Precordial pain     Acute chest pain     Unstable angina ([facility])     Chronic kidney disease (CKD) stage G3a/A3, moderately decreased glomerular filtration rate (GFR) between 45-59 mL/min/1.73 square meter and albuminuria creatinine ratio greater than 300 mg/g ([facility])     Cardiomyopathy ([facility])     Hypertensive crisis     Diabetic neuropathy ([facility])     HTN (hypertension)     Epigastric pain     Acute on chronic congestive heart failure (HCC)     Bilateral lower extremity edema     Acute on chronic systolic CHF (congestive heart failure) ([facility])     Diabetic foot ulcer with osteomyelitis ([address] Visit for wound check     Moderate malnutrition ([facility]      - will need bilateral foot debridement   - Ideally revascularization should be done prior to debridement if possible. Without better blood supply I expect difficulty with healing.  - now with syncope/orthostatic hypotension-workup pending  - will follow Vascular plans--his feet need debridement but not urgently as there are no systemic signs of infection.   - will continue to follow      Yarelis Moreno MD

## 2021-06-11 NOTE — PROGRESS NOTES
Comprehensive Nutrition Assessment    Type and Reason for Visit:  Reassess    Nutrition Recommendations/Plan:   Continue carb controlled diet   Encourage consistent meal intake   Offer oral nutrition supplement as needed/acceopted during stay   Will continue to follow up    Nutrition Assessment:  Remains on carb controlled diet with low potassium, low phos diet also. Meal intake remains %. Plan in progress for vascular surgery and foot debridement. Will continue to follow at moderate nutrition risk. Malnutrition Assessment:  Malnutrition Status: Moderate malnutrition    Context:  Chronic Illness     Findings of the 6 clinical characteristics of malnutrition:  Energy Intake:  7 - 75% or less estimated energy requirements for 1 month or longer  Weight Loss:  7 - Greater than 10% over 6 months     Body Fat Loss:  1 - Mild body fat loss Orbital, Buccal region   Muscle Mass Loss:  No significant muscle mass loss    Fluid Accumulation:  1 - Mild Extremities   Strength:  Not Performed    Estimated Daily Nutrient Needs:  Energy (kcal):  1293-0641 (1.1-1.2 kcals/kg); Weight Used for Energy Requirements:  Current     Protein (g):   (1.2-1.5 g/kg); Weight Used for Protein Requirements:  Ideal        Fluid (ml/day):  fluids mgt per nephrology; Method Used for Fluid Requirements:  Standard Renal      Nutrition Related Findings:  asleep in bed, breakfast tray bedside ate 100% of meal, foot wrapped with wounds-DM ulcers, HbA1c7.9% severe PAD      Wounds:  Diabetic Ulcer, Multiple       Current Nutrition Therapies:    ADULT DIET; Regular; 4 carb choices (60 gm/meal); Low Potassium (Less than 3000 mg/day);  Low Phosphorus (Less than 1000 mg)    Anthropometric Measures:  · Height: 6' (182.9 cm)  · Current Body Weight: 259 lb 14.8 oz (117.9 kg)   · Usual Body Weight: 293 lb (132.9 kg) (12/17/21)     · Ideal Body Weight: 178 lbs; % Ideal Body Weight 146 %   · BMI: 35.2  · Adjusted Body Weight:  ; Amputation, No Adjustment (toes)   · BMI Categories: Obese Class 2 (BMI 35.0 -39.9)       Nutrition Diagnosis:   · Moderate malnutrition, In context of chronic illness related to catabolic illness as evidenced by poor intake prior to admission, weight loss greater than or equal to 10% in 6 months, mild loss of subcutaneous fat      Nutrition Interventions:   Food and/or Nutrient Delivery:  Continue Current Diet  Nutrition Education/Counseling:  Education initiated (A1c ~7, good control)   Coordination of Nutrition Care:  Continue to monitor while inpatient    Goals:  Pt will consume at least 50% of meals and supplements       Nutrition Monitoring and Evaluation:   Behavioral-Environmental Outcomes:  None Identified   Food/Nutrient Intake Outcomes:  Diet Advancement/Tolerance, Food and Nutrient Intake  Physical Signs/Symptoms Outcomes:  Biochemical Data, Meal Time Behavior, Skin, Weight     Discharge Planning:    Continue current diet, Recommend pursue outpatient diabetes education     Electronically signed by Geovany Ruff RD, LD on 6/11/21 at 10:35 AM EDT    Contact: [phone]

## 2021-06-11 NOTE — PLAN OF CARE
Problem: Pain:  Goal: Pain level will decrease  Description: Pain level will decrease  Outcome: Ongoing  Goal: Control of acute pain  Description: Control of acute pain  Outcome: Ongoing  Goal: Control of chronic pain  Description: Control of chronic pain  Outcome: Ongoing     Problem: Skin Integrity:  Goal: Will show no infection signs and symptoms  Description: Will show no infection signs and symptoms  Outcome: Ongoing  Goal: Absence of new skin breakdown  Description: Absence of new skin breakdown  Outcome: Ongoing     Problem: Nutrition  Goal: Optimal nutrition therapy  Outcome: Ongoing     Problem: Falls - Risk of:  Goal: Will remain free from falls  Description: Will remain free from falls  Outcome: Ongoing  Goal: Absence of physical injury  Description: Absence of physical injury  Outcome: Ongoing

## 2021-06-12 LAB
ANION GAP SERPL CALCULATED.3IONS-SCNC: 8 MMOL/L (ref 4–16)
BUN BLDV-MCNC: 27 MG/DL (ref 6–23)
CALCIUM SERPL-MCNC: 7.3 MG/DL (ref 8.3–10.6)
CHLORIDE BLD-SCNC: 103 MMOL/L (ref 99–110)
CO2: 29 MMOL/L (ref 21–32)
CREAT SERPL-MCNC: 2.2 MG/DL (ref 0.9–1.3)
CULTURE: NORMAL
CULTURE: NORMAL
GFR AFRICAN AMERICAN: 36 ML/MIN/1.73M2
GFR NON-AFRICAN AMERICAN: 30 ML/MIN/1.73M2
GLUCOSE BLD-MCNC: 161 MG/DL (ref 70–99)
GLUCOSE BLD-MCNC: 168 MG/DL (ref 70–99)
GLUCOSE BLD-MCNC: 187 MG/DL (ref 70–99)
GLUCOSE BLD-MCNC: 225 MG/DL (ref 70–99)
GLUCOSE BLD-MCNC: 251 MG/DL (ref 70–99)
GLUCOSE BLD-MCNC: 275 MG/DL (ref 70–99)
HCT VFR BLD CALC: 28.9 % (ref 42–52)
HEMOCCULT SP1 STL QL: NEGATIVE
HEMOGLOBIN: 8.4 GM/DL (ref 13.5–18)
HIGH SENSITIVE C-REACTIVE PROTEIN: 32.4 MG/L
Lab: NORMAL
Lab: NORMAL
MAGNESIUM: 1.8 MG/DL (ref 1.8–2.4)
MCH RBC QN AUTO: 27.7 PG (ref 27–31)
MCHC RBC AUTO-ENTMCNC: 29.1 % (ref 32–36)
MCV RBC AUTO: 95.4 FL (ref 78–100)
PDW BLD-RTO: 15.3 % (ref 11.7–14.9)
PHOSPHORUS: 4.5 MG/DL (ref 2.5–4.9)
PLATELET # BLD: 204 K/CU MM (ref 140–440)
PMV BLD AUTO: 9.8 FL (ref 7.5–11.1)
POTASSIUM SERPL-SCNC: 4.7 MMOL/L (ref 3.5–5.1)
RBC # BLD: 3.03 M/CU MM (ref 4.6–6.2)
SODIUM BLD-SCNC: 140 MMOL/L (ref 135–145)
SPECIMEN: NORMAL
SPECIMEN: NORMAL
WBC # BLD: 6.7 K/CU MM (ref 4–10.5)

## 2021-06-12 PROCEDURE — 82962 GLUCOSE BLOOD TEST: CPT

## 2021-06-12 PROCEDURE — 6370000000 HC RX 637 (ALT 250 FOR IP): Performed by: STUDENT IN AN ORGANIZED HEALTH CARE EDUCATION/TRAINING PROGRAM

## 2021-06-12 PROCEDURE — 2700000000 HC OXYGEN THERAPY PER DAY

## 2021-06-12 PROCEDURE — 83735 ASSAY OF MAGNESIUM: CPT

## 2021-06-12 PROCEDURE — 84100 ASSAY OF PHOSPHORUS: CPT

## 2021-06-12 PROCEDURE — G0328 FECAL BLOOD SCRN IMMUNOASSAY: HCPCS

## 2021-06-12 PROCEDURE — 94761 N-INVAS EAR/PLS OXIMETRY MLT: CPT

## 2021-06-12 PROCEDURE — 6370000000 HC RX 637 (ALT 250 FOR IP): Performed by: INTERNAL MEDICINE

## 2021-06-12 PROCEDURE — 2580000003 HC RX 258: Performed by: INTERNAL MEDICINE

## 2021-06-12 PROCEDURE — 6360000002 HC RX W HCPCS: Performed by: STUDENT IN AN ORGANIZED HEALTH CARE EDUCATION/TRAINING PROGRAM

## 2021-06-12 PROCEDURE — 85027 COMPLETE CBC AUTOMATED: CPT

## 2021-06-12 PROCEDURE — 6360000002 HC RX W HCPCS: Performed by: INTERNAL MEDICINE

## 2021-06-12 PROCEDURE — 2580000003 HC RX 258: Performed by: STUDENT IN AN ORGANIZED HEALTH CARE EDUCATION/TRAINING PROGRAM

## 2021-06-12 PROCEDURE — 80048 BASIC METABOLIC PNL TOTAL CA: CPT

## 2021-06-12 PROCEDURE — 36415 COLL VENOUS BLD VENIPUNCTURE: CPT

## 2021-06-12 PROCEDURE — 1200000000 HC SEMI PRIVATE

## 2021-06-12 RX ADMIN — INSULIN LISPRO 6 UNITS: 100 INJECTION, SOLUTION INTRAVENOUS; SUBCUTANEOUS at 18:05

## 2021-06-12 RX ADMIN — MORPHINE SULFATE 4 MG: 4 INJECTION, SOLUTION INTRAMUSCULAR; INTRAVENOUS at 20:11

## 2021-06-12 RX ADMIN — INSULIN LISPRO 2 UNITS: 100 INJECTION, SOLUTION INTRAVENOUS; SUBCUTANEOUS at 12:29

## 2021-06-12 RX ADMIN — CARVEDILOL 3.12 MG: 3.12 TABLET, FILM COATED ORAL at 07:55

## 2021-06-12 RX ADMIN — CARVEDILOL 3.12 MG: 3.12 TABLET, FILM COATED ORAL at 20:11

## 2021-06-12 RX ADMIN — SODIUM CHLORIDE, PRESERVATIVE FREE 10 ML: 5 INJECTION INTRAVENOUS at 09:00

## 2021-06-12 RX ADMIN — PREGABALIN 150 MG: 75 CAPSULE ORAL at 20:10

## 2021-06-12 RX ADMIN — PANTOPRAZOLE SODIUM 40 MG: 40 TABLET, DELAYED RELEASE ORAL at 06:00

## 2021-06-12 RX ADMIN — INSULIN LISPRO 4 UNITS: 100 INJECTION, SOLUTION INTRAVENOUS; SUBCUTANEOUS at 08:31

## 2021-06-12 RX ADMIN — PREGABALIN 150 MG: 75 CAPSULE ORAL at 07:55

## 2021-06-12 RX ADMIN — DICYCLOMINE HYDROCHLORIDE 10 MG: 10 CAPSULE ORAL at 16:48

## 2021-06-12 RX ADMIN — AMLODIPINE BESYLATE 10 MG: 10 TABLET ORAL at 07:55

## 2021-06-12 RX ADMIN — DICYCLOMINE HYDROCHLORIDE 10 MG: 10 CAPSULE ORAL at 06:00

## 2021-06-12 RX ADMIN — DICYCLOMINE HYDROCHLORIDE 10 MG: 10 CAPSULE ORAL at 11:53

## 2021-06-12 RX ADMIN — SODIUM CHLORIDE, PRESERVATIVE FREE 10 ML: 5 INJECTION INTRAVENOUS at 20:11

## 2021-06-12 RX ADMIN — ATORVASTATIN CALCIUM 40 MG: 40 TABLET, FILM COATED ORAL at 20:11

## 2021-06-12 RX ADMIN — DICYCLOMINE HYDROCHLORIDE 10 MG: 10 CAPSULE ORAL at 20:11

## 2021-06-12 RX ADMIN — CEFTRIAXONE SODIUM 2000 MG: 2 INJECTION, POWDER, FOR SOLUTION INTRAMUSCULAR; INTRAVENOUS at 15:19

## 2021-06-12 RX ADMIN — MORPHINE SULFATE 4 MG: 4 INJECTION, SOLUTION INTRAMUSCULAR; INTRAVENOUS at 15:24

## 2021-06-12 RX ADMIN — PREGABALIN 150 MG: 75 CAPSULE ORAL at 15:19

## 2021-06-12 ASSESSMENT — PAIN DESCRIPTION - PROGRESSION: CLINICAL_PROGRESSION: GRADUALLY IMPROVING

## 2021-06-12 ASSESSMENT — PAIN DESCRIPTION - DIRECTION: RADIATING_TOWARDS: DOWN

## 2021-06-12 ASSESSMENT — PAIN DESCRIPTION - ORIENTATION
ORIENTATION: RIGHT;LEFT
ORIENTATION: RIGHT;LEFT

## 2021-06-12 ASSESSMENT — PAIN DESCRIPTION - PAIN TYPE
TYPE: ACUTE PAIN
TYPE: ACUTE PAIN

## 2021-06-12 ASSESSMENT — PAIN DESCRIPTION - FREQUENCY
FREQUENCY: CONTINUOUS
FREQUENCY: CONTINUOUS

## 2021-06-12 ASSESSMENT — PAIN DESCRIPTION - LOCATION
LOCATION: FOOT;TOE (COMMENT WHICH ONE)
LOCATION: FOOT

## 2021-06-12 ASSESSMENT — PAIN DESCRIPTION - ONSET
ONSET: ON-GOING
ONSET: ON-GOING

## 2021-06-12 ASSESSMENT — PAIN SCALES - GENERAL
PAINLEVEL_OUTOF10: 7
PAINLEVEL_OUTOF10: 8
PAINLEVEL_OUTOF10: 7

## 2021-06-12 ASSESSMENT — PAIN DESCRIPTION - DESCRIPTORS
DESCRIPTORS: BURNING;CONSTANT;THROBBING
DESCRIPTORS: STABBING

## 2021-06-12 NOTE — PROGRESS NOTES
Hospitalist Progress Note      Name:  Adriana Degroot /Age/Sex: 1950  (79 y.o. male)   MRN & CSN:  [id number] & [id number] Admission Date/Time: 2021  7:58 PM   Location:  [address] [place] [address] PCP: Martha Posey Day: 6    Assessment and Plan:     Adriana Degroot is a 79 y.o.  male diabetes, CAD, s/p stent, PAD, ischemic cardiomyopathy s/p ICD, CKD 3A, hypertension, chronic lymphedema, nicotine dependence who presents with Diabetic foot ulcer with osteomyelitis ([facility]        #. Infected left diabetic foot ulcer: With concern for underlying osteomyelitis  Surgical plans for debridement/amputation on hold due to inability to obtain vascular clearance. Angiograms on hold due to NANCY  ? Outpatient vs inpatient angiogram depending on clinical course, kidney functions, final ID and gen surgery recommendations. Wound cultures growing MRSA and Proteus  IV antibiotic streamlined to vancomycin and Rocephin (initially Vanco and meropenem). Appreciate ID recommendation. #.  Severe PAD s/p previous intervention, s/p amputation right third toe, right fifth toe. Vascular and general surgery following. Continue to hold Plavix. #. .  Syncope and collapse: On 6/10/2021 in the hospital.  Lasted about 10 seconds  Likely orthostatic  Orthostatic vitals ordered and pending  Echocardiogram ordered and pending     #. Acute kidney injury superimposed on CKD 3: Not POA  Creatinine peaked at 2.3, down to 2.2 today, baseline 1.4  Likely prerenal from overdiuresis. Agree with gentle IV  Renally dose vancomycin  Nephrology following. #. .  Moderate malnutrition: In the context of chronic illness  Encourage completing meals. Nutritional supplementation when able     #. Hypertension: On Coreg 3.125 mg twice daily, Cardura 4 mg twice daily, amlodipine 10 mg daily  Cardura discontinued today due to syncopal episode yesterday     #.   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

## 2021-06-12 NOTE — PROGRESS NOTES
Nephrology Progress Note  6/12/2021 10:44 AM  Subjective: Interval History: Melissa William is a 79 y.o. male with weakness and aware that infection of the foot is the etiology of these concerns and accepted where he is at. And possible need for surgery        Data:   Scheduled Meds:   vancomycin (VANCOCIN) intermittent dosing (placeholder)   Other See Admin Instructions    insulin lispro  0-12 Units Subcutaneous TID WC    insulin lispro  0-6 Units Subcutaneous Nightly    cefTRIAXone (ROCEPHIN) IV  2,000 mg Intravenous Q24H    amLODIPine  10 mg Oral Daily    pantoprazole  40 mg Oral QAM AC    atorvastatin  40 mg Oral Nightly    carvedilol  3.125 mg Oral BID    [Held by provider] clopidogrel  75 mg Oral Daily    dicyclomine  10 mg Oral 4x Daily AC & HS    pregabalin  150 mg Oral TID    sodium chloride flush  5-40 mL Intravenous BID     Continuous Infusions:   sodium chloride 50 mL/hr at 06/11/21 1158    sodium chloride Stopped (06/10/21 0109)    dextrose           CBC No results for input(s): WBC, HGB, HCT, PLT in the last 72 hours. BMP   Recent Labs     06/10/21  0756 06/11/21  0627 06/11/21  2217    140  --    K 4.2 4.4  --     104  --    CO2 28 27  --    PHOS 3.7  --   --    BUN 14 20  --    CREATININE 2.3* 2.2* 2.3*     Hepatic:   Recent Labs     06/10/21  0756 06/11/21  0627   AST 9* 10*   ALT <5* <5*   BILITOT 0.9 0.8   ALKPHOS 63 66     Troponin: No results for input(s): TROPONINI in the last 72 hours. BNP: No results for input(s): BNP in the last 72 hours. Lipids: No results for input(s): CHOL, HDL in the last 72 hours. Invalid input(s): LDLCALCU  ABGs: No results found for: PHART, PO2ART, UED7WZM  INR: No results for input(s): INR in the last 72 hours.   Renal Labs  Albumin:    Lab Results   Component Value Date    LABALBU 2.7 06/11/2021     Calcium:    Lab Results   Component Value Date    CALCIUM 7.6 06/11/2021     Phosphorus:    Lab Results   Component Value Date PHOS 3.7 06/10/2021     U/A:    Lab Results   Component Value Date    NITRU NEGATIVE 06/11/2021    COLORU STRAW 06/11/2021    PHUR 5.0 08/19/2016    WBCUA <1 06/11/2021    RBCUA 1 06/11/2021    MUCUS RARE 06/08/2021    TRICHOMONAS NONE SEEN 06/11/2021    BACTERIA RARE 06/11/2021    CLARITYU CLEAR 06/11/2021    SPECGRAV 1.005 06/11/2021    UROBILINOGEN NEGATIVE 06/11/2021    BILIRUBINUR NEGATIVE 06/11/2021    BLOODU SMALL 06/11/2021    KETUA NEGATIVE 06/11/2021     ABG:  No results found for: PHART, STR8EEY, PO2ART, ISU0GTZ, BEART, THGBART, NIG1KFB, L2PXLERV  HgBA1c:    Lab Results   Component Value Date    LABA1C 7.9 06/07/2021     Microalbumen/Creatinine ratio:  No components found for: RUCREAT  TSH:  No results found for: TSH  IRON:    Lab Results   Component Value Date    IRON 42 06/08/2021     Iron Saturation:  No components found for: PERCENTFE  TIBC:    Lab Results   Component Value Date    TIBC 171 06/08/2021     FERRITIN:    Lab Results   Component Value Date    FERRITIN 102 06/08/2021     RPR:  No results found for: RPR  TIGIST:  No results found for: ANATITER, TIGIST  24 Hour Urine for Creatinine Clearance:  No components found for: CREAT4, UHRS10, UTV10      Objective:   I/O: No intake/output data recorded. No intake/output data recorded. I/O this shift:  In: -   Out: 600 [Urine:600]  Vitals: BP (!) 152/72   Pulse 61   Temp 98.1 °F (36.7 °C) (Oral)   Resp 18   Ht 6' (1.829 m)   Wt 275 lb 9.2 oz (125 kg)   SpO2 96%   BMI 37.37 kg/m²  {  General appearance: awake weak  HEENT: Head: Normal, normocephalic, atraumatic. Neck: supple, symmetrical, trachea midline  Lungs: diminished breath sounds bilaterally  Heart: S1, S2 normal  Abdomen: abnormal findings:  soft nt  Extremities: edema but dressing on lower extremities and positive edema and wound  Neurologic: Mental status: alertness: alert        Assessment and Plan:      IMP:  Acute renal failure from ATN on CKD 3 from cardiorenal syndrome most likely  2. Hypertension with orthostatic hypotension  3. Atherosclerotic disease/with wound therapy  4. Cardiomyopathy with EF 45%    Plan     #1 creatinine 2.3 most likely close to baseline kidney function by 28 to 34% will monitor at this time no acute indications for adjustments maintain diuresis  2. Blood pressure variable but stable monitor  3. Follow-up surgical recommendations and possible podiatry follow-up for the wounds on the lower extremities and possible treatment planned important for him to follow-up and maintain care with wound care  4. Cardiac monitor with diuresis  5. Renal monitoring in the above setting    Baseline creatinine is most likely closer to 2. At this time recommendation should be that maintain antibiotic therapy per infectious disease. When kidney function antibiotic therapy this time would recommend doing an angiogram with Dr. Trisha Jimenez at that time and then after that is done with follow-up plan for surgery with Dr. Jessika Hernandez is no acute indications need for that now.            Sumi Hemphill MD, MD

## 2021-06-12 NOTE — PROGRESS NOTES
[facility]  consulted by Dr. Rashad Mccloud for monitoring and adjustment. Indication for treatment: DFU, possible osteomyelitis  Goal trough: 15 mcg/mL  AUC/RJ: 400-600    Pertinent Laboratory Values:   Temp Readings from Last 3 Encounters:   06/12/21 98.1 °F (36.7 °C) (Oral)   03/11/21 97.8 °F (36.6 °C) (Oral)   01/06/21 98.3 °F (36.8 °C)     Recent Labs     06/12/21  1024   WBC 6.7     Recent Labs     06/10/21  0756 06/11/21  0627 06/11/21  2217 06/12/21  1024   BUN 14 20  --  27*   CREATININE 2.3* 2.2* 2.3* 2.2*     Estimated Creatinine Clearance: 43 mL/min (A) (based on SCr of 2.2 mg/dL (H)).     Intake/Output Summary (Last 24 hours) at 6/12/2021 1550  Last data filed at 6/12/2021 1006  Gross per 24 hour   Intake 10 ml   Output 600 ml   Net -590 ml       Pertinent Cultures:  Date    Source    Results  06/07   Blood    NGTD  06/07   Wound    Proteus mirabilis, MRSA, Corynebacterium striatum    Assessment:  · WBC wnL; patient is afebrile  · History of CKD stage III, now in NANCY  · Scr trends 1.4 -> 2.3. stable for now @ 2.2  · Day(s) of therapy: 6  · MRI pending, r/o osteomyelitis  · Vancomycin concentration:   · 06/09 - 15.6, therapeutic following 2 doses  · 6/12:  Missed level    Plan:  · Dose decreased to 1250 mg q24h following therapeutic level after x2 doses  · Predicted trough: ~14  · Predicted AUC/RJ: 511  · Patient now in NANCY, hold vancomycin for now  · Random level missed this AM  · Re-order level for tomorrow AM  · Intermittent dosing based on levels while in NANCY  · Pharmacy will continue to monitor patient and adjust therapy as indicated    VANCOMYCIN CONCENTRATION SCHEDULED FOR 06/13/2021 @ 0600    Thank you for the consult,  Shadia Meier, [address] [place] [address]  6/12/2021 3:50 PM

## 2021-06-12 NOTE — PROGRESS NOTES
PATIENT NAME: Catalina Barrow     Reason for Visit: PAD    TODAY'S DATE: 06/12/21    SUBJECTIVE:    Pt resting comfortable in bed. Bilateral feet wounds wrapped. Anxious to know treatment plan so he can get home soon. Concerned that he will be stuck here longterm. OBJECTIVE:   VITALS:    Vitals:    06/12/21 0745   BP: (!) 152/72   Pulse: 61   Resp: 18   Temp: 98.1 °F (36.7 °C)   SpO2: 96%     INTAKE/OUTPUT:    Date 06/12/21 0000 - 06/12/21 2359   Shift 7897-3707 2847-4471 4265-0432 24 Hour Total   INTAKE   Shift Total(mL/kg)       OUTPUT   Urine(mL/kg/hr)  600  600   Shift Total(mL/kg)  600(4.8)  600(4.8)   Weight (kg) 125 125 125 125      Patient Vitals for the past 96 hrs (Last 3 readings):   Weight   06/12/21 0555 275 lb 9.2 oz (125 kg)       EXAM:  Blood pressure (!) 152/72, pulse 61, temperature 98.1 °F (36.7 °C), temperature source Oral, resp. rate 18, height 6' (1.829 m), weight 275 lb 9.2 oz (125 kg), SpO2 96 %. General appearance: No apparent distress, appears stated age and cooperative. Skin: unremarkable  HEENT Normocephalic, atraumatic without obvious deformity. Neck: Supple, Trachea midline   Lungs: Good respiratory effort.  Clear to auscultation, bilaterally  Heart: Regular rate/ rhythm   Abdomen: Soft, non-tender or non-distended   Extremities: modedema warm well perfused  Neurologic: Alert, grossly intact  Mental status: normal affect      Data:  CBC:   Recent Labs     06/12/21  1024   WBC 6.7   HGB 8.4*   HCT 28.9*        BMP:    Recent Labs     06/10/21  0756 06/11/21 0627 06/11/21 2217 06/12/21  1024    140  --  140   K 4.2 4.4  --  4.7    104  --  103   CO2 28 27  --  29   BUN 14 20  --  27*   CREATININE 2.3* 2.2* 2.3* 2.2*   GLUCOSE 93 88  --  161*     Hepatic:   Recent Labs     06/10/21  0756 06/11/21  0627   AST 9* 10*   ALT <5* <5*   BILITOT 0.9 0.8   ALKPHOS 63 66     Mag:      Recent Labs     06/10/21  0756 06/12/21  1024   MG 1.8 1.8      Phos:     Recent Labs

## 2021-06-12 NOTE — PLAN OF CARE
Problem: Pain:  Goal: Pain level will decrease  Description: Pain level will decrease  Outcome: Met This Shift  Goal: Control of acute pain  Description: Control of acute pain  Outcome: Met This Shift  Goal: Control of chronic pain  Description: Control of chronic pain  Outcome: Met This Shift     Problem: Skin Integrity:  Goal: Will show no infection signs and symptoms  Description: Will show no infection signs and symptoms  Outcome: Met This Shift  Goal: Absence of new skin breakdown  Description: Absence of new skin breakdown  Outcome: Met This Shift     Problem: Nutrition  Goal: Optimal nutrition therapy  Outcome: Met This Shift     Problem: Falls - Risk of:  Goal: Will remain free from falls  Description: Will remain free from falls  Outcome: Met This Shift  Goal: Absence of physical injury  Description: Absence of physical injury  Outcome: Met This Shift

## 2021-06-13 LAB
ANION GAP SERPL CALCULATED.3IONS-SCNC: 8 MMOL/L (ref 4–16)
BUN BLDV-MCNC: 30 MG/DL (ref 6–23)
CALCIUM SERPL-MCNC: 7 MG/DL (ref 8.3–10.6)
CHLORIDE BLD-SCNC: 100 MMOL/L (ref 99–110)
CO2: 30 MMOL/L (ref 21–32)
CREAT SERPL-MCNC: 2.2 MG/DL (ref 0.9–1.3)
CULTURE: ABNORMAL
DOSE AMOUNT: NORMAL
DOSE TIME: NORMAL
GFR AFRICAN AMERICAN: 36 ML/MIN/1.73M2
GFR NON-AFRICAN AMERICAN: 30 ML/MIN/1.73M2
GLUCOSE BLD-MCNC: 156 MG/DL (ref 70–99)
GLUCOSE BLD-MCNC: 160 MG/DL (ref 70–99)
GLUCOSE BLD-MCNC: 163 MG/DL (ref 70–99)
GLUCOSE BLD-MCNC: 185 MG/DL (ref 70–99)
GLUCOSE BLD-MCNC: 221 MG/DL (ref 70–99)
HIGH SENSITIVE C-REACTIVE PROTEIN: 19.5 MG/L
Lab: ABNORMAL
POTASSIUM SERPL-SCNC: 5 MMOL/L (ref 3.5–5.1)
SODIUM BLD-SCNC: 138 MMOL/L (ref 135–145)
SPECIMEN: ABNORMAL
VANCOMYCIN RANDOM: 14.5 UG/ML

## 2021-06-13 PROCEDURE — 1200000000 HC SEMI PRIVATE

## 2021-06-13 PROCEDURE — 82962 GLUCOSE BLOOD TEST: CPT

## 2021-06-13 PROCEDURE — 6370000000 HC RX 637 (ALT 250 FOR IP): Performed by: STUDENT IN AN ORGANIZED HEALTH CARE EDUCATION/TRAINING PROGRAM

## 2021-06-13 PROCEDURE — 80202 ASSAY OF VANCOMYCIN: CPT

## 2021-06-13 PROCEDURE — 94761 N-INVAS EAR/PLS OXIMETRY MLT: CPT

## 2021-06-13 PROCEDURE — 99232 SBSQ HOSP IP/OBS MODERATE 35: CPT | Performed by: INTERNAL MEDICINE

## 2021-06-13 PROCEDURE — 86141 C-REACTIVE PROTEIN HS: CPT

## 2021-06-13 PROCEDURE — 6360000002 HC RX W HCPCS: Performed by: STUDENT IN AN ORGANIZED HEALTH CARE EDUCATION/TRAINING PROGRAM

## 2021-06-13 PROCEDURE — 6370000000 HC RX 637 (ALT 250 FOR IP): Performed by: INTERNAL MEDICINE

## 2021-06-13 PROCEDURE — 80048 BASIC METABOLIC PNL TOTAL CA: CPT

## 2021-06-13 PROCEDURE — 6360000002 HC RX W HCPCS: Performed by: INTERNAL MEDICINE

## 2021-06-13 PROCEDURE — 36415 COLL VENOUS BLD VENIPUNCTURE: CPT

## 2021-06-13 PROCEDURE — 2580000003 HC RX 258: Performed by: INTERNAL MEDICINE

## 2021-06-13 PROCEDURE — 6370000000 HC RX 637 (ALT 250 FOR IP): Performed by: NURSE PRACTITIONER

## 2021-06-13 PROCEDURE — 94150 VITAL CAPACITY TEST: CPT

## 2021-06-13 PROCEDURE — 2700000000 HC OXYGEN THERAPY PER DAY

## 2021-06-13 PROCEDURE — 2580000003 HC RX 258: Performed by: STUDENT IN AN ORGANIZED HEALTH CARE EDUCATION/TRAINING PROGRAM

## 2021-06-13 PROCEDURE — 99232 SBSQ HOSP IP/OBS MODERATE 35: CPT | Performed by: SURGERY

## 2021-06-13 RX ORDER — OXYCODONE HYDROCHLORIDE 5 MG/1
5 TABLET ORAL EVERY 4 HOURS PRN
Status: DISCONTINUED | OUTPATIENT
Start: 2021-06-13 | End: 2021-06-15 | Stop reason: HOSPADM

## 2021-06-13 RX ADMIN — AMLODIPINE BESYLATE 10 MG: 10 TABLET ORAL at 08:47

## 2021-06-13 RX ADMIN — INSULIN LISPRO 2 UNITS: 100 INJECTION, SOLUTION INTRAVENOUS; SUBCUTANEOUS at 08:48

## 2021-06-13 RX ADMIN — DICYCLOMINE HYDROCHLORIDE 10 MG: 10 CAPSULE ORAL at 16:24

## 2021-06-13 RX ADMIN — MORPHINE SULFATE 4 MG: 4 INJECTION, SOLUTION INTRAMUSCULAR; INTRAVENOUS at 23:01

## 2021-06-13 RX ADMIN — DICYCLOMINE HYDROCHLORIDE 10 MG: 10 CAPSULE ORAL at 20:15

## 2021-06-13 RX ADMIN — CARVEDILOL 3.12 MG: 3.12 TABLET, FILM COATED ORAL at 08:47

## 2021-06-13 RX ADMIN — VANCOMYCIN HYDROCHLORIDE 1250 MG: 5 INJECTION, POWDER, LYOPHILIZED, FOR SOLUTION INTRAVENOUS at 12:20

## 2021-06-13 RX ADMIN — MORPHINE SULFATE 4 MG: 4 INJECTION, SOLUTION INTRAMUSCULAR; INTRAVENOUS at 08:47

## 2021-06-13 RX ADMIN — CARVEDILOL 3.12 MG: 3.12 TABLET, FILM COATED ORAL at 20:15

## 2021-06-13 RX ADMIN — CEFTRIAXONE SODIUM 2000 MG: 2 INJECTION, POWDER, FOR SOLUTION INTRAMUSCULAR; INTRAVENOUS at 16:53

## 2021-06-13 RX ADMIN — DICYCLOMINE HYDROCHLORIDE 10 MG: 10 CAPSULE ORAL at 12:14

## 2021-06-13 RX ADMIN — PREGABALIN 150 MG: 75 CAPSULE ORAL at 15:20

## 2021-06-13 RX ADMIN — SODIUM CHLORIDE, PRESERVATIVE FREE 10 ML: 5 INJECTION INTRAVENOUS at 08:47

## 2021-06-13 RX ADMIN — MORPHINE SULFATE 4 MG: 4 INJECTION, SOLUTION INTRAMUSCULAR; INTRAVENOUS at 16:24

## 2021-06-13 RX ADMIN — OXYCODONE HYDROCHLORIDE 5 MG: 5 TABLET ORAL at 15:20

## 2021-06-13 RX ADMIN — PANTOPRAZOLE SODIUM 40 MG: 40 TABLET, DELAYED RELEASE ORAL at 06:08

## 2021-06-13 RX ADMIN — DICYCLOMINE HYDROCHLORIDE 10 MG: 10 CAPSULE ORAL at 06:08

## 2021-06-13 RX ADMIN — INSULIN LISPRO 4 UNITS: 100 INJECTION, SOLUTION INTRAVENOUS; SUBCUTANEOUS at 16:50

## 2021-06-13 RX ADMIN — PREGABALIN 150 MG: 75 CAPSULE ORAL at 08:47

## 2021-06-13 RX ADMIN — ATORVASTATIN CALCIUM 40 MG: 40 TABLET, FILM COATED ORAL at 20:15

## 2021-06-13 RX ADMIN — PREGABALIN 150 MG: 75 CAPSULE ORAL at 20:15

## 2021-06-13 RX ADMIN — SODIUM CHLORIDE, PRESERVATIVE FREE 10 ML: 5 INJECTION INTRAVENOUS at 20:13

## 2021-06-13 RX ADMIN — INSULIN LISPRO 2 UNITS: 100 INJECTION, SOLUTION INTRAVENOUS; SUBCUTANEOUS at 12:14

## 2021-06-13 RX ADMIN — MORPHINE SULFATE 4 MG: 4 INJECTION, SOLUTION INTRAMUSCULAR; INTRAVENOUS at 20:12

## 2021-06-13 ASSESSMENT — PAIN DESCRIPTION - ORIENTATION
ORIENTATION: RIGHT;LEFT
ORIENTATION: RIGHT;LEFT

## 2021-06-13 ASSESSMENT — PAIN DESCRIPTION - DIRECTION: RADIATING_TOWARDS: DOWN

## 2021-06-13 ASSESSMENT — PAIN DESCRIPTION - LOCATION
LOCATION: FOOT
LOCATION: FOOT

## 2021-06-13 ASSESSMENT — PAIN SCALES - GENERAL
PAINLEVEL_OUTOF10: 10
PAINLEVEL_OUTOF10: 8
PAINLEVEL_OUTOF10: 8
PAINLEVEL_OUTOF10: 7
PAINLEVEL_OUTOF10: 7
PAINLEVEL_OUTOF10: 9

## 2021-06-13 ASSESSMENT — PAIN DESCRIPTION - ONSET
ONSET: ON-GOING
ONSET: ON-GOING

## 2021-06-13 ASSESSMENT — PAIN DESCRIPTION - PAIN TYPE
TYPE: ACUTE PAIN
TYPE: ACUTE PAIN

## 2021-06-13 ASSESSMENT — PAIN DESCRIPTION - DESCRIPTORS
DESCRIPTORS: BURNING;CONSTANT;THROBBING
DESCRIPTORS: BURNING;CONSTANT

## 2021-06-13 ASSESSMENT — PAIN DESCRIPTION - PROGRESSION: CLINICAL_PROGRESSION: GRADUALLY WORSENING

## 2021-06-13 ASSESSMENT — PAIN DESCRIPTION - FREQUENCY
FREQUENCY: CONTINUOUS
FREQUENCY: CONTINUOUS

## 2021-06-13 ASSESSMENT — PAIN - FUNCTIONAL ASSESSMENT: PAIN_FUNCTIONAL_ASSESSMENT: PREVENTS OR INTERFERES SOME ACTIVE ACTIVITIES AND ADLS

## 2021-06-13 NOTE — PLAN OF CARE
Problem: Pain:  Goal: Pain level will decrease  Description: Pain level will decrease  6/13/2021 1529 by Carlitos Wolfe RN  Outcome: Met This Shift  6/13/2021 1051 by Carlitos Wolfe RN  Outcome: Met This Shift  Goal: Control of acute pain  Description: Control of acute pain  6/13/2021 1529 by Carlitos Wolfe RN  Outcome: Met This Shift  6/13/2021 1051 by Carlitos Wolfe RN  Outcome: Met This Shift  Goal: Control of chronic pain  Description: Control of chronic pain  6/13/2021 1529 by Carlitos Wolfe RN  Outcome: Met This Shift  6/13/2021 1051 by Carlitos Wolfe RN  Outcome: Met This Shift     Problem: Skin Integrity:  Goal: Will show no infection signs and symptoms  Description: Will show no infection signs and symptoms  6/13/2021 1529 by Carlitos Wolfe RN  Outcome: Met This Shift  6/13/2021 1051 by Carlitos Wolfe RN  Outcome: Met This Shift  Goal: Absence of new skin breakdown  Description: Absence of new skin breakdown  6/13/2021 1529 by Carlitos Wolfe RN  Outcome: Met This Shift  6/13/2021 1051 by Carlitos Wolfe RN  Outcome: Met This Shift     Problem: Nutrition  Goal: Optimal nutrition therapy  6/13/2021 1529 by Carlitos Wolfe RN  Outcome: Met This Shift  6/13/2021 1051 by Carlitos Wolfe RN  Outcome: Met This Shift     Problem: Falls - Risk of:  Goal: Will remain free from falls  Description: Will remain free from falls  6/13/2021 1529 by Carlitos Wolfe RN  Outcome: Met This Shift  6/13/2021 1051 by Carlitos Wolfe RN  Outcome: Met This Shift  Goal: Absence of physical injury  Description: Absence of physical injury  6/13/2021 1529 by Carlitos Wolfe RN  Outcome: Met This Shift  6/13/2021 1051 by Carlitos Wolfe RN  Outcome: Met This Shift

## 2021-06-13 NOTE — PROGRESS NOTES
Hospitalist Progress Note      Name:  Carmelo Adams /Age/Sex: 1950  (79 y.o. male)   MRN & CSN:  [id number] & [id number] Admission Date/Time: 2021  7:58 PM   Location:  [address] [place] [address] PCP: Sudha Bruno Day: 7    Assessment and Plan:   Carmelo Adams is a 79 y.o.  male diabetes, CAD, s/p stent, PAD, ischemic cardiomyopathy s/p ICD, CKD 3A, hypertension, chronic lymphedema, nicotine dependence who presents with left foot pain, falls  Discharge and Maggots from the left fluids and was diagnosed with Diabetic foot ulcer with osteomyelitis ([facility] [address]       1. Syncope and collapse: On 6/10/2021 in the hospital.  No further episodes. Echo with severe TR and RVSP of 68, low EF of 45 to 50%    2. Acute kidney injury superimposed on CKD 3: Not POA  Creatinine peaked at 2.3, down to 2.2 today, baseline 1.4  Nephrology following  Vancomycin being dosed renally  Angiogram on hold due to NANCY. 3.  Infected left diabetic foot ulcer: With concern for underlying osteomyelitis  Surgical plans for debridement/amputation on hold due to inability to obtain vascular clearance. Angiograms on hold due to NANCY  Wound cultures growing MRSA and Proteus  IV antibiotic streamlined to vancomycin and Rocephin (initially Vanco and meropenem). Appreciate ID.    4.  Severe PAD s/p previous intervention, s/p amputation right third toe, right fifth toe. Vascular and general surgery following. Continue to hold Plavix. Pain control with oxycodone, Tylenol, IV morphine    5. Moderate malnutrition: In the context of chronic illness  Encourage completing meals. Nutritional supplementation when able    6. Hypertension: On Coreg 3.125 mg twice daily, Cardura 4 mg twice daily, amlodipine 10 mg daily  Cardura discontinued today due to syncopal episode yesterday    7. 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 Continue Lyrica 150 mg 3 times daily    8.   Chronic systolic CHF: Stable  Elevated proBNP is likely due to CKD    9. Dyslipidemia: Continue Lipitor 40 mg daily. Diet ADULT DIET; Regular; 4 carb choices (60 gm/meal); Low Potassium (Less than 3000 mg/day); Low Phosphorus (Less than 1000 mg) for possible surgery   DVT Prophylaxis [] Lovenox, []  Heparin, [] SCDs, [] Ambulation subcu Lovenox on hold for possible surgery   GI Prophylaxis [x] PPI,  [] H2 Blocker,  [] Carafate,  [] Diet/Tube Feeds   Code Status Full Code   Disposition Patient requires continued admission due to  diabetic foot ulcer with gangrene-we will likely need amputation   MDM [] Low, [] Moderate,[x]  High  Patient's risk as above due to diabetic foot ulcer with gangrene and possible osteo-     History of Present Illness:     Chief Complaint: Diabetic foot ulcer with osteomyelitis ([facility] [address]  Wendy Fierro is a 79 y.o.  male with a past medical history of ischemic and myopathy s/p ICD, CAD s/p stent, insulin-dependent type 2 diabetes, CKD 3A, hypertension, who presented to the hospital on account of intractable left foot pain and concern for infection. Objective: Intake/Output Summary (Last 24 hours) at 6/13/2021 1423  Last data filed at 6/13/2021 0847  Gross per 24 hour   Intake 10 ml   Output --   Net 10 ml      Vitals:   Vitals:    06/13/21 0830   BP: (!) 151/70   Pulse: 61   Resp: 16   Temp: 98.1 °F (36.7 °C)   SpO2: 94%     Physical Exam:   GEN Awake male, sitting upright in bed in no apparent distress. Appears dated age. HEENT: PERRLA, mucous membranes are moist.   RESP: Clear to auscultation, no wheezes, rales or rhonchi. Symmetric chest movement while on room air. CARDIO/VASC: S1/S2 auscultated. RRR. ICD in upper chest  GI: Abdomen is soft, nontender, no organomegaly.   BS normal.  MSK:Charcot's deformity both ankles, lymphedematous lower extremity, s/p left proximal phalanx amputation, right third and fifth toe amputation  SKIN: bilateral lower extremity with atrophic changes, healed ulceration  NEURO: Cranial nerves appear grossly intact, normal speech, no lateralizing weakness. PSYCH Awake, alert, oriented x 4. Affect appropriate.     Medications:   Medications:    vancomycin (VANCOCIN) intermittent dosing (placeholder)   Other See Admin Instructions    insulin lispro  0-12 Units Subcutaneous TID WC    insulin lispro  0-6 Units Subcutaneous Nightly    cefTRIAXone (ROCEPHIN) IV  2,000 mg Intravenous Q24H    amLODIPine  10 mg Oral Daily    pantoprazole  40 mg Oral QAM AC    atorvastatin  40 mg Oral Nightly    carvedilol  3.125 mg Oral BID    [Held by provider] clopidogrel  75 mg Oral Daily    dicyclomine  10 mg Oral 4x Daily AC & HS    pregabalin  150 mg Oral TID    sodium chloride flush  5-40 mL Intravenous BID      Infusions:    sodium chloride 50 mL/hr at 06/11/21 1158    sodium chloride Stopped (06/10/21 0109)    dextrose       PRN Meds: hydrALAZINE (APRESOLINE) ivpb, 10 mg, Q6H PRN  albuterol sulfate HFA, 2 puff, Q4H PRN  sodium chloride flush, 5-40 mL, PRN  sodium chloride, 25 mL, PRN  ondansetron, 4 mg, Q8H PRN   Or  ondansetron, 4 mg, Q6H PRN  polyethylene glycol, 17 g, Daily PRN  acetaminophen, 650 mg, Q6H PRN   Or  acetaminophen, 650 mg, Q6H PRN  glucose, 15 g, PRN  dextrose, 12.5 g, PRN  glucagon (rDNA), 1 mg, PRN  dextrose, 100 mL/hr, PRN  morphine, 2 mg, Q2H PRN   Or  morphine, 4 mg, Q2H PRN          Electronically signed by Naresh Tanner MD on 6/13/2021 at 2:23 PM

## 2021-06-13 NOTE — PROGRESS NOTES
[facility]  consulted by Dr. Yovani Terry for monitoring and adjustment. Indication for treatment: DFU, possible osteomyelitis  Goal trough: 15 mcg/mL  AUC/RJ: 400-600    Pertinent Laboratory Values:   Temp Readings from Last 3 Encounters:   06/13/21 98.1 °F (36.7 °C) (Oral)   03/11/21 97.8 °F (36.6 °C) (Oral)   01/06/21 98.3 °F (36.8 °C)     Recent Labs     06/12/21  1024   WBC 6.7     Recent Labs     06/11/21  0627 06/11/21  2217 06/12/21  1024   BUN 20  --  27*   CREATININE 2.2* 2.3* 2.2*     Estimated Creatinine Clearance: 43 mL/min (A) (based on SCr of 2.2 mg/dL (H)).     Intake/Output Summary (Last 24 hours) at 6/13/2021 1011  Last data filed at 6/13/2021 0847  Gross per 24 hour   Intake 10 ml   Output --   Net 10 ml       Pertinent Cultures:  Date    Source    Results  06/07   Blood    Negative  06/07   Wound    Proteus mirabilis, MRSA, Corynebacterium striatum    Assessment:  · WBC wnL; patient is afebrile  · History of CKD stage III, now in NANCY  · Scr trends 1.4 -> 2.3. stable for now @ 2.2  · Day(s) of therapy: 7  · MRI pending, r/o osteomyelitis  · Vancomycin concentration:   · 06/09 - 15.6, therapeutic following 2 doses  · 6/12:  Missed level  · 6/13:  14.5, OK to re-dsoe    Plan:  · Dose decreased to 1250 mg q24h following therapeutic level after x2 doses  · Predicted trough: ~14  · Predicted AUC/RJ: 511  · Patient now in NANCY; intermittent dosing based on levels while in NANCY  · Random level therapeutic @ 14.5  · Ordered Vancomycin 1250mg IV x 1 dose today  · Repeat random level in 2 days  · Pharmacy will continue to monitor patient and adjust therapy as indicated    VANCOMYCIN CONCENTRATION SCHEDULED FOR 06/15/2021 @ 0600    Thank you for the consult,  Cortney Pastor, [facility]  6/13/2021 10:11 AM

## 2021-06-13 NOTE — PROGRESS NOTES
Nephrology Progress Note  6/13/2021 9:29 AM  Subjective: Interval History: Minor Leticia is a 79 y.o. male doing okay overall lying in bed we had a long discussion about overall care and plan        Data:   Scheduled Meds:   vancomycin (VANCOCIN) intermittent dosing (placeholder)   Other See Admin Instructions    insulin lispro  0-12 Units Subcutaneous TID WC    insulin lispro  0-6 Units Subcutaneous Nightly    cefTRIAXone (ROCEPHIN) IV  2,000 mg Intravenous Q24H    amLODIPine  10 mg Oral Daily    pantoprazole  40 mg Oral QAM AC    atorvastatin  40 mg Oral Nightly    carvedilol  3.125 mg Oral BID    [Held by provider] clopidogrel  75 mg Oral Daily    dicyclomine  10 mg Oral 4x Daily AC & HS    pregabalin  150 mg Oral TID    sodium chloride flush  5-40 mL Intravenous BID     Continuous Infusions:   sodium chloride 50 mL/hr at 06/11/21 1158    sodium chloride Stopped (06/10/21 0109)    dextrose           CBC   Recent Labs     06/12/21  1024   WBC 6.7   HGB 8.4*   HCT 28.9*         BMP   Recent Labs     06/11/21  0627 06/11/21  2217 06/12/21  1024     --  140   K 4.4  --  4.7     --  103   CO2 27  --  29   PHOS  --   --  4.5   BUN 20  --  27*   CREATININE 2.2* 2.3* 2.2*     Hepatic:   Recent Labs     06/11/21 0627   AST 10*   ALT <5*   BILITOT 0.8   ALKPHOS 66     Troponin: No results for input(s): TROPONINI in the last 72 hours. BNP: No results for input(s): BNP in the last 72 hours. Lipids: No results for input(s): CHOL, HDL in the last 72 hours. Invalid input(s): LDLCALCU  ABGs: No results found for: PHART, PO2ART, KSW5WPL  INR: No results for input(s): INR in the last 72 hours.   Renal Labs  Albumin:    Lab Results   Component Value Date    LABALBU 2.7 06/11/2021     Calcium:    Lab Results   Component Value Date    CALCIUM 7.3 06/12/2021     Phosphorus:    Lab Results   Component Value Date    PHOS 4.5 06/12/2021     U/A:    Lab Results   Component Value Date    NITRU NEGATIVE 06/11/2021    COLORU STRAW 06/11/2021    PHUR 5.0 08/19/2016    WBCUA <1 06/11/2021    RBCUA 1 06/11/2021    MUCUS RARE 06/08/2021    TRICHOMONAS NONE SEEN 06/11/2021    BACTERIA RARE 06/11/2021    CLARITYU CLEAR 06/11/2021    SPECGRAV 1.005 06/11/2021    UROBILINOGEN NEGATIVE 06/11/2021    BILIRUBINUR NEGATIVE 06/11/2021    BLOODU SMALL 06/11/2021    KETUA NEGATIVE 06/11/2021     ABG:  No results found for: PHART, MCY9BHS, PO2ART, BSM6ECR, BEART, THGBART, COW7IHH, H1SVPHCE  HgBA1c:    Lab Results   Component Value Date    LABA1C 7.9 06/07/2021     Microalbumen/Creatinine ratio:  No components found for: RUCREAT  TSH:  No results found for: TSH  IRON:    Lab Results   Component Value Date    IRON 42 06/08/2021     Iron Saturation:  No components found for: PERCENTFE  TIBC:    Lab Results   Component Value Date    TIBC 171 06/08/2021     FERRITIN:    Lab Results   Component Value Date    FERRITIN 102 06/08/2021     RPR:  No results found for: RPR  TIGIST:  No results found for: ANATITER, TIGIST  24 Hour Urine for Creatinine Clearance:  No components found for: CREAT4, UHRS10, UTV10      Objective:   I/O: 06/12 0701 - 06/13 0700  In: 10 [I.V.:10]  Out: 600 [Urine:600]  I/O last 3 completed shifts: In: 10 [I.V.:10]  Out: 600 [Urine:600]  I/O this shift:  In: 10 [I.V.:10]  Out: -   Vitals: BP (!) 151/70   Pulse 61   Temp 98.1 °F (36.7 °C) (Oral)   Resp 16   Ht 6' (1.829 m)   Wt 275 lb 9.2 oz (125 kg)   SpO2 94%   BMI 37.37 kg/m²  {  General appearance: awake weak  HEENT: Head: Normal, normocephalic, atraumatic. Neck: supple, symmetrical, trachea midline  Lungs: diminished breath sounds bilaterally  Heart: S1, S2 normal  Abdomen: abnormal findings:  soft nt  Extremities: edema but dressing on lower extremities and positive edema still present  Neurologic: Mental status: alertness: alert        Assessment and Plan:      IMP:  Acute renal failure from ATN on CKD 3 from cardiorenal syndrome most likely  2.

## 2021-06-13 NOTE — PROGRESS NOTES
Infectious Disease Progress Note  2021   Patient Name: Branden Cunningham : 1950   Impression  · Left Foot Infected DFU, Possible OM:  ? Right Foot DFU:  § Afebrile, no leukocytosis  § -Left Foot wound culture: Proteus mirabilis, MRSA, Corynebacterium striatum  § -Blood cultures 0/2-NGTD  § CRP 37.6, ESR 66  § -XR Foot Left: Ulceration involving the stump of the left great toe. Bony irregularity of the proximal phalangeal stump, raising the possibilility of OM  § -XR Foot Right: no acute periostitis or bone destruction. § -Doppler BLE: no evidence of DVT  § -Duplex: imp of influx disease  § 6/10-MRI Left foot pending  § Dr. Ruslan Frank, general surgery, onboard, imp of may require BKA (unilateral or bilateral) if unable to heal wounds, will follow vascular workup along with ABX regimen, plan for debridement     · IDDM with Peripheral Neuropathy:  § -HbAIC 7.9     ? Allergy to PCN (hives, facial swelling)    ? Syncopal Episode 6/10     ? Tobacco Abuse: Cigar     ? Severe PVD:  § Dr. Karis Elizalde onboard  § -Duplex shows imp of inflow disease  § Plan for angiogram per left radial     ? NANCY on CKD3:  § Dr. Yair Monroy onboard     ? Chest Pain/ Chronic HFrEF/ ICD x 6 years/ CAD:  § Dr. Renee Petersen onboard     ? Morbid Obesity:  BMI: 35.26     ? HLD     ? COPD/ STACI, Oxygen NC PRN at home     · Multi-morbidity: per PMHx:  S/p left hallux amputation     Plan:  · Continue IV vancomycin, per pharmacy dosing,    ? Continue IV ceftriaxone 2 gm q24h  ? Will need at least 6 weeks of abx  · Trend CRP, ordered  ? Follow with plan for angiogram per Dr. Kortney Whitley, on hold due to NANCY  ? Surgical plans on hold due to NANCY    Ongoing Antimicrobial Therapy  Vancomycin -  Ceftriaxone 6/10? Completed Antimicrobial Therapy  Meropenem -10  ? History:? Interval history noted. Chief complaint: Left foot infected DFU, possible OM. Right foot DFU. Denies n/v/d/f or untoward effects of antibiotics.   Resting quietly. Physical Exam:  Vital Signs: BP (!) 151/70   Pulse 61   Temp 98.1 °F (36.7 °C) (Oral)   Resp 16   Ht 6' (1.829 m)   Wt 275 lb 9.2 oz (125 kg)   SpO2 94%   BMI 37.37 kg/m²     Gen: alert and oriented X3, no distress  Skin: no stigmata of endocarditis  Wounds: C/D/I Right foot: right hallux with dried ulcerations, right 2nd toe dried ulcer. Left foot: Left Hallux amputation stump open wound draining purulent fluid, 2nd toe with ulceration draining purulent fluid. HEMT: AT/NC Oropharynx pink, moist, and without lesions or exudates; dentition in good state of repair  Eyes: PERRLA, EOMI, conjunctiva pink, sclera anicteric. Neck: Supple. Trachea midline. No LAD. Chest: no distress and CTA. Good air movement. Heart: RRR and no MRG. Abd: soft, non-distended, no tenderness, no hepatomegaly. Normoactive bowel sounds. Ext: no clubbing, cyanosis, or edema  Neuro: Mental status intact.  CN 2-12 intact and no focal sensory or motor deficits     Radiologic / Imaging / TESTING  6/7/21 XR Foot Left:  Impression   Ulceration involving the stump of the left great toe.  Bony irregularity of   the proximal phalangeal stump, raising the possibility of osteomyelitis.      6/7/21 XR Foot Right:  Impression   1.  No acute periostitis or bony destruction.      6/7/21 VL Dup Lower Extremity Venous Bilateral:  Impression   No evidence of DVT in either lower extremity above the knee.  Deep veins   below the knee are not visualized due to diffuse subcutaneous edema.      6/8/21 VL Dup Lower Extremity Arteries Bilateral:  Impression   Aortic or iliac inflow disease, with loss of normal appearing triphasic   waveforms at the common femoral arteries.       Moderate stenosis within the proximal SFA on the right, with poststenotic   reduced velocities noted within the remainder of the left lower extremity,   with loss of normal multiphasic waveforms suggesting at least moderate   disease.  No significant focal severe stenosis seen.  Three-vessel runoff to   the right foot.       Moderate deep femoral artery stenosis on the right, with no severe stenosis   seen within the left lower extremity.  However the distal PTA and peroneal   artery are occluded.  Loss of multiphasic waveforms related to at least   moderate diffuse disease. Labs:    Recent Results (from the past 24 hour(s))   POCT Glucose    Collection Time: 06/12/21  4:19 PM   Result Value Ref Range    POC Glucose 275 (H) 70 - 99 MG/DL   POCT Glucose    Collection Time: 06/12/21  7:47 PM   Result Value Ref Range    POC Glucose 187 (H) 70 - 99 MG/DL   POCT Glucose    Collection Time: 06/13/21  6:36 AM   Result Value Ref Range    POC Glucose 163 (H) 70 - 99 MG/DL   C-Reactive Protein    Collection Time: 06/13/21  8:04 AM   Result Value Ref Range    CRP, High Sensitivity 19.5 mg/L   Vancomycin, random    Collection Time: 06/13/21  8:04 AM   Result Value Ref Range    Vancomycin Rm 14.5 UG/ML    DOSE AMOUNT DOSE AMT.  GIVEN - none, random level     DOSE TIME DOSE TIME GIVEN - none, random level    Basic metabolic panel    Collection Time: 06/13/21  8:04 AM   Result Value Ref Range    Sodium 138 135 - 145 MMOL/L    Potassium 5.0 3.5 - 5.1 MMOL/L    Chloride 100 99 - 110 mMol/L    CO2 30 21 - 32 MMOL/L    Anion Gap 8 4 - 16    BUN 30 (H) 6 - 23 MG/DL    CREATININE 2.2 (H) 0.9 - 1.3 MG/DL    Glucose 156 (H) 70 - 99 MG/DL    Calcium 7.0 (L) 8.3 - 10.6 MG/DL    GFR Non-African American 30 (L) >60 mL/min/1.73m2    GFR  36 (L) >60 mL/min/1.73m2   POCT Glucose    Collection Time: 06/13/21 11:17 AM   Result Value Ref Range    POC Glucose 185 (H) 70 - 99 MG/DL     CULTURE results: Invalid input(s): BLOOD CULTURE,  URINE CULTURE, SURGICAL CULTURE    Diagnosis:  Patient Active Problem List   Diagnosis    Chronic coronary artery disease    Automatic implantable cardioverter-defibrillator in situ    Chronic combined systolic and diastolic heart failure ([address]    Chronic kidney disease, stage III (moderate) ([facility])    Mixed hyperlipidemia    Sick sinus syndrome (HCC)    Type 2 diabetes mellitus with diabetic polyneuropathy ([address]    Spinal stenosis of lumbar region    Obesity, Class I, BMI 30-34.9    Postoperative hypertension    S/P partial colectomy    Tubulovillous adenoma of colon    Microalbuminuria    PVD (peripheral vascular disease) ([facility])    Limb ischemia    Necrotic toes (HCC)    Toe gangrene ([facility])    Diabetic foot infection ([facility])    Chronic kidney disease (CKD) stage G3a/A2, moderately decreased glomerular filtration rate (GFR) between 45-59 mL/min/1.73 square meter and albuminuria creatinine ratio between  mg/g (HCC)    DM (diabetes mellitus) ([facility])    Edema    ICD (implantable cardioverter-defibrillator) battery depletion    Hyperkalemia    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[facility])    Ischemia of toe    Acute kidney injury ([facility])    Fluid overload    DM (diabetes mellitus) ([address]    Hypertensive emergency    Precordial pain    Acute chest pain    Unstable angina ([facility])    Chronic kidney disease (CKD) stage G3a/A3, moderately decreased glomerular filtration rate (GFR) between 45-59 mL/min/1.73 square meter and albuminuria creatinine ratio greater than 300 mg/g (HCC)    Cardiomyopathy (HCC)    Hypertensive crisis    Diabetic neuropathy (HCC)    HTN (hypertension)    Epigastric pain    Acute on chronic congestive heart failure (HCC)    Bilateral lower extremity edema    Acute on chronic systolic CHF (congestive heart failure) ([facility])    Diabetic foot ulcer with osteomyelitis ([address]    Visit for wound check    Moderate malnutrition ([address]       Active Problems  Principal Problem:    Diabetic foot ulcer with osteomyelitis ([address]  Active Problems:    Visit for wound check    Moderate malnutrition ([address]  Resolved Problems:    * No resolved hospital problems.  *    Electronically signed by: Electronically signed by Kyle Wesley MD on 6/13/2021 at 2:48 PM

## 2021-06-13 NOTE — PROGRESS NOTES
GENERAL SURGERY PROGRESS NOTE    Ashley Lopez is a 79 y.o. male with DM, PAD and bilateral foot ulcers left worse than right. Subjective:  Doing ok this morning. Denies complaints. Seems more interested in going home and follow up for angiogram/debridement as outpatient once renal function improves. Objective:    Vitals: VITALS:  BP (!) 151/70   Pulse 61   Temp 98.1 °F (36.7 °C) (Oral)   Resp 16   Ht 6' (1.829 m)   Wt 275 lb 9.2 oz (125 kg)   SpO2 94%   BMI 37.37 kg/m²     I/O: 06/12 0701 - 06/13 0700  In: 10 [I.V.:10]  Out: 600 [Urine:600]    Labs/Imaging Results:   Lab Results   Component Value Date     06/12/2021    K 4.7 06/12/2021    K 5.1 01/10/2018     06/12/2021    CO2 29 06/12/2021    BUN 27 06/12/2021    CREATININE 2.2 06/12/2021    GLUCOSE 161 06/12/2021    CALCIUM 7.3 06/12/2021      Lab Results   Component Value Date    WBC 6.7 06/12/2021    HGB 8.4 (L) 06/12/2021    HCT 28.9 (L) 06/12/2021    MCV 95.4 06/12/2021     06/12/2021       IV Fluids:   sodium chloride Last Rate: 50 mL/hr at 06/11/21 1158    sodium chloride Last Rate: Stopped (06/10/21 0109)    dextrose    Scheduled Meds: vancomycin (VANCOCIN) intermittent dosing (placeholder), , Other, See Admin Instructions    insulin lispro, 0-12 Units, Subcutaneous, TID WC    insulin lispro, 0-6 Units, Subcutaneous, Nightly    cefTRIAXone (ROCEPHIN) IV, 2,000 mg, Intravenous, Q24H    amLODIPine, 10 mg, Oral, Daily    pantoprazole, 40 mg, Oral, QAM AC    atorvastatin, 40 mg, Oral, Nightly    carvedilol, 3.125 mg, Oral, BID    [Held by provider] clopidogrel, 75 mg, Oral, Daily    dicyclomine, 10 mg, Oral, 4x Daily AC & HS    pregabalin, 150 mg, Oral, TID    sodium chloride flush, 5-40 mL, Intravenous, BID    Physical Exam:  General: A&O x 3, no distress. HEENT: Anicteric sclerae, MMM. Extremities: bilateral foot dressings in place and clean. Minimal edema, no erythema or purulence.   Abdomen: Soft, nontender, nondistended. Assessment and Plan:  79 y.o. male with bilateral foot ulcers. US with proximal arterial disease on the right, more distal disease/vessel occlusion on the left, likely has inflow disease per Vascular surgery.       Patient Active Problem List:     Chronic coronary artery disease     Automatic implantable cardioverter-defibrillator in situ     Chronic combined systolic and diastolic heart failure (HCC)     Chronic kidney disease, stage III (moderate) ([facility])     Mixed hyperlipidemia     Sick sinus syndrome ([facility])     Type 2 diabetes mellitus with diabetic polyneuropathy ([facility]     Spinal stenosis of lumbar region     Obesity, Class I, BMI 30-34.9     Postoperative hypertension     S/P partial colectomy     Tubulovillous adenoma of colon     Microalbuminuria     PVD (peripheral vascular disease) ([facility])     Limb ischemia     Necrotic toes ([facility])     Toe gangrene ([facility])     Diabetic foot infection ([facility])     Chronic kidney disease (CKD) stage G3a/A2, moderately decreased glomerular filtration rate (GFR) between 45-59 mL/min/1.73 square meter and albuminuria creatinine ratio between  mg/g ([facility])     DM (diabetes mellitus) ([address]     Edema     ICD (implantable cardioverter-defibrillator) battery depletion     Hyperkalemia     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[facility])     Ischemia of toe     Acute kidney injury ([address]     Fluid overload     DM (diabetes mellitus) ([address]     Hypertensive emergency     Precordial pain     Acute chest pain     Unstable angina ([facility])     Chronic kidney disease (CKD) stage G3a/A3, moderately decreased glomerular filtration rate (GFR) between 45-59 mL/min/1.73 square meter and albuminuria creatinine ratio greater than 300 mg/g (HCC)     Cardiomyopathy (HCC)     Hypertensive crisis     Diabetic neuropathy (HCC)     HTN (hypertension)     Epigastric pain     Acute on chronic congestive heart failure (HCC)     Bilateral lower extremity edema     Acute on chronic systolic CHF (congestive heart failure) (HCC)     Diabetic foot ulcer with osteomyelitis ([address]     Visit for wound check     Moderate malnutrition ([facility]      - will need bilateral foot debridement   - Ideally revascularization should be done prior to debridement if possible.   Without better blood supply I expect difficulty with healing.  - Patient could discharge home with home health and follow up in Wound Care clinic and return for Vascular intervention and wound debridement as an outpatient  - will continue to follow while admitted    Emilie Lea MD

## 2021-06-13 NOTE — PROGRESS NOTES
PATIENT NAME: Марина Ugarte     Reason for Visit: PAD    TODAY'S DATE: 06/13/21    SUBJECTIVE:    Pt resting comfortable in bed. Bilateral feet wounds wrapped. OBJECTIVE:   VITALS:    Vitals:    06/13/21 0830   BP: (!) 151/70   Pulse: 61   Resp: 16   Temp: 98.1 °F (36.7 °C)   SpO2: 94%     INTAKE/OUTPUT:    Date 06/13/21 0000 - 06/13/21 2359   Shift 2315-9216 [id number] 1118-7990 24 Hour Total   INTAKE   I.V.(mL/kg/hr)  10  10   Shift Total(mL/kg)  10(0.1)  10(0.1)   OUTPUT   Shift Total(mL/kg)       Weight (kg) 125 125 125 125      Patient Vitals for the past 96 hrs (Last 3 readings):   Weight   06/12/21 0555 275 lb 9.2 oz (125 kg)       EXAM:  Blood pressure (!) 151/70, pulse 61, temperature 98.1 °F (36.7 °C), temperature source Oral, resp. rate 16, height 6' (1.829 m), weight 275 lb 9.2 oz (125 kg), SpO2 94 %. General appearance: No apparent distress, appears stated age and cooperative. Skin: unremarkable  HEENT Normocephalic, atraumatic without obvious deformity. Neck: Supple, Trachea midline   Lungs: Good respiratory effort. Clear to auscultation, bilaterally  Heart: Regular rate/ rhythm   Abdomen: Soft, non-tender or non-distended   Extremities: modedema warm well perfused  Neurologic: Alert, grossly intact  Mental status: normal affect      Data:  CBC:   Recent Labs     06/12/21  1024   WBC 6.7   HGB 8.4*   HCT 28.9*        BMP:    Recent Labs     06/11/21  0627 06/11/21  2217 06/12/21  1024     --  140   K 4.4  --  4.7     --  103   CO2 27  --  29   BUN 20  --  27*   CREATININE 2.2* 2.3* 2.2*   GLUCOSE 88  --  161*     Hepatic:   Recent Labs     06/11/21 0627   AST 10*   ALT <5*   BILITOT 0.8   ALKPHOS 66     Mag:      Recent Labs     06/12/21  1024   MG 1.8      Phos:     Recent Labs     06/12/21  1024   PHOS 4.5      INR: No results for input(s): INR in the last 72 hours.     Radiology Review:      ASSESSMENT AND PLAN:    Patient Active Problem List   Diagnosis    Chronic coronary artery disease    Automatic implantable cardioverter-defibrillator in situ    Chronic combined systolic and diastolic heart failure (HCC)    Chronic kidney disease, stage III (moderate) [facility]    Mixed hyperlipidemia    Sick sinus syndrome ([facility])    Type 2 diabetes mellitus with diabetic polyneuropathy ([facility]    Spinal stenosis of lumbar region    Obesity, Class I, BMI 30-34.9    Postoperative hypertension    S/P partial colectomy    Tubulovillous adenoma of colon    Microalbuminuria    PVD (peripheral vascular disease) ([facility])    Limb ischemia    Necrotic toes ([facility])    Toe gangrene ([facility])    Diabetic foot infection ([facility] [facility]    Chronic kidney disease (CKD) stage G3a/A2, moderately decreased glomerular filtration rate (GFR) between 45-59 mL/min/1.73 square meter and albuminuria creatinine ratio between  mg/g ([facility])    DM (diabetes mellitus) ([facility]    Edema    ICD (implantable cardioverter-defibrillator) battery depletion    Hyperkalemia    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[facility])    Ischemia of toe    Acute kidney injury ([facility])    Fluid overload    DM (diabetes mellitus) ([facility]    Hypertensive emergency    Precordial pain    Acute chest pain    Unstable angina ([facility])    Chronic kidney disease (CKD) stage G3a/A3, moderately decreased glomerular filtration rate (GFR) between 45-59 mL/min/1.73 square meter and albuminuria creatinine ratio greater than 300 mg/g ([facility])    Cardiomyopathy ([facility])    Hypertensive crisis    Diabetic neuropathy ([facility])    HTN (hypertension)    Epigastric pain    Acute on chronic congestive heart failure ([facility])    Bilateral lower extremity edema    Acute on chronic systolic CHF (congestive heart failure) ([facility])    Diabetic foot ulcer with osteomyelitis ([facility] [facility]    Visit for wound check    Moderate malnutrition ([facility]       Recommened mobilize as able. Creatinine pending. Renal recs noted and appreciated. Will plan to proceed with angio once creatinine 1.8.

## 2021-06-14 ENCOUNTER — APPOINTMENT (OUTPATIENT)
Dept: INTERVENTIONAL RADIOLOGY/VASCULAR | Age: 71
DRG: 300 | End: 2021-06-14
Payer: MEDICARE

## 2021-06-14 PROBLEM — Z79.2 LONG TERM (CURRENT) USE OF ANTIBIOTICS: Status: ACTIVE | Noted: 2021-06-14

## 2021-06-14 LAB
ANION GAP SERPL CALCULATED.3IONS-SCNC: 7 MMOL/L (ref 4–16)
BUN BLDV-MCNC: 34 MG/DL (ref 6–23)
CALCIUM SERPL-MCNC: 7.3 MG/DL (ref 8.3–10.6)
CHLORIDE BLD-SCNC: 103 MMOL/L (ref 99–110)
CO2: 30 MMOL/L (ref 21–32)
CREAT SERPL-MCNC: 2.3 MG/DL (ref 0.9–1.3)
GFR AFRICAN AMERICAN: 34 ML/MIN/1.73M2
GFR NON-AFRICAN AMERICAN: 28 ML/MIN/1.73M2
GLUCOSE BLD-MCNC: 135 MG/DL (ref 70–99)
GLUCOSE BLD-MCNC: 153 MG/DL (ref 70–99)
GLUCOSE BLD-MCNC: 156 MG/DL (ref 70–99)
GLUCOSE BLD-MCNC: 164 MG/DL (ref 70–99)
GLUCOSE BLD-MCNC: 180 MG/DL (ref 70–99)
HIGH SENSITIVE C-REACTIVE PROTEIN: 16.7 MG/L
POTASSIUM SERPL-SCNC: 5.2 MMOL/L (ref 3.5–5.1)
SODIUM BLD-SCNC: 140 MMOL/L (ref 135–145)

## 2021-06-14 PROCEDURE — 94150 VITAL CAPACITY TEST: CPT

## 2021-06-14 PROCEDURE — 2580000003 HC RX 258: Performed by: INTERNAL MEDICINE

## 2021-06-14 PROCEDURE — 1200000000 HC SEMI PRIVATE

## 2021-06-14 PROCEDURE — 0JH63XZ INSERTION OF TUNNELED VASCULAR ACCESS DEVICE INTO CHEST SUBCUTANEOUS TISSUE AND FASCIA, PERCUTANEOUS APPROACH: ICD-10-PCS | Performed by: RADIOLOGY

## 2021-06-14 PROCEDURE — C1894 INTRO/SHEATH, NON-LASER: HCPCS

## 2021-06-14 PROCEDURE — 86141 C-REACTIVE PROTEIN HS: CPT

## 2021-06-14 PROCEDURE — C1751 CATH, INF, PER/CENT/MIDLINE: HCPCS

## 2021-06-14 PROCEDURE — B543ZZA ULTRASONOGRAPHY OF RIGHT JUGULAR VEINS, GUIDANCE: ICD-10-PCS | Performed by: RADIOLOGY

## 2021-06-14 PROCEDURE — 36415 COLL VENOUS BLD VENIPUNCTURE: CPT

## 2021-06-14 PROCEDURE — B518YZZ FLUOROSCOPY OF SUPERIOR VENA CAVA USING OTHER CONTRAST: ICD-10-PCS | Performed by: RADIOLOGY

## 2021-06-14 PROCEDURE — 77001 FLUOROGUIDE FOR VEIN DEVICE: CPT

## 2021-06-14 PROCEDURE — 2709999900 HC NON-CHARGEABLE SUPPLY

## 2021-06-14 PROCEDURE — 36558 INSERT TUNNELED CV CATH: CPT

## 2021-06-14 PROCEDURE — 6360000002 HC RX W HCPCS: Performed by: INTERNAL MEDICINE

## 2021-06-14 PROCEDURE — 80048 BASIC METABOLIC PNL TOTAL CA: CPT

## 2021-06-14 PROCEDURE — 99233 SBSQ HOSP IP/OBS HIGH 50: CPT | Performed by: NURSE PRACTITIONER

## 2021-06-14 PROCEDURE — 02HV33Z INSERTION OF INFUSION DEVICE INTO SUPERIOR VENA CAVA, PERCUTANEOUS APPROACH: ICD-10-PCS | Performed by: RADIOLOGY

## 2021-06-14 PROCEDURE — 82962 GLUCOSE BLOOD TEST: CPT

## 2021-06-14 PROCEDURE — 99232 SBSQ HOSP IP/OBS MODERATE 35: CPT | Performed by: SURGERY

## 2021-06-14 PROCEDURE — 6370000000 HC RX 637 (ALT 250 FOR IP): Performed by: INTERNAL MEDICINE

## 2021-06-14 PROCEDURE — 2580000003 HC RX 258: Performed by: STUDENT IN AN ORGANIZED HEALTH CARE EDUCATION/TRAINING PROGRAM

## 2021-06-14 PROCEDURE — 76937 US GUIDE VASCULAR ACCESS: CPT

## 2021-06-14 PROCEDURE — 6370000000 HC RX 637 (ALT 250 FOR IP): Performed by: STUDENT IN AN ORGANIZED HEALTH CARE EDUCATION/TRAINING PROGRAM

## 2021-06-14 PROCEDURE — 94761 N-INVAS EAR/PLS OXIMETRY MLT: CPT

## 2021-06-14 RX ORDER — CEFTRIAXONE 500 MG/1
2000 INJECTION, POWDER, FOR SOLUTION INTRAMUSCULAR; INTRAVENOUS EVERY 24 HOURS
Qty: 78000 MG | Refills: 0
Start: 2021-06-14 | End: 2021-06-15 | Stop reason: SDUPTHER

## 2021-06-14 RX ORDER — PREGABALIN 75 MG/1
75 CAPSULE ORAL 3 TIMES DAILY
Qty: 90 CAPSULE | Refills: 0 | Status: SHIPPED | OUTPATIENT
Start: 2021-06-14 | End: 2021-06-15

## 2021-06-14 RX ORDER — OXYCODONE HYDROCHLORIDE 5 MG/1
5 TABLET ORAL EVERY 4 HOURS PRN
Qty: 12 TABLET | Refills: 0 | Status: SHIPPED | OUTPATIENT
Start: 2021-06-14 | End: 2021-06-15

## 2021-06-14 RX ORDER — AMLODIPINE BESYLATE 10 MG/1
10 TABLET ORAL DAILY
Qty: 30 TABLET | Refills: 3 | Status: SHIPPED | OUTPATIENT
Start: 2021-06-15 | End: 2021-06-15

## 2021-06-14 RX ADMIN — CARVEDILOL 3.12 MG: 3.12 TABLET, FILM COATED ORAL at 21:54

## 2021-06-14 RX ADMIN — CARVEDILOL 3.12 MG: 3.12 TABLET, FILM COATED ORAL at 10:18

## 2021-06-14 RX ADMIN — AMLODIPINE BESYLATE 10 MG: 10 TABLET ORAL at 10:18

## 2021-06-14 RX ADMIN — OXYCODONE HYDROCHLORIDE 5 MG: 5 TABLET ORAL at 18:53

## 2021-06-14 RX ADMIN — INSULIN LISPRO 2 UNITS: 100 INJECTION, SOLUTION INTRAVENOUS; SUBCUTANEOUS at 12:27

## 2021-06-14 RX ADMIN — PREGABALIN 150 MG: 75 CAPSULE ORAL at 21:54

## 2021-06-14 RX ADMIN — INSULIN LISPRO 2 UNITS: 100 INJECTION, SOLUTION INTRAVENOUS; SUBCUTANEOUS at 09:12

## 2021-06-14 RX ADMIN — DICYCLOMINE HYDROCHLORIDE 10 MG: 10 CAPSULE ORAL at 21:54

## 2021-06-14 RX ADMIN — DICYCLOMINE HYDROCHLORIDE 10 MG: 10 CAPSULE ORAL at 10:18

## 2021-06-14 RX ADMIN — PREGABALIN 150 MG: 75 CAPSULE ORAL at 16:05

## 2021-06-14 RX ADMIN — SODIUM CHLORIDE, PRESERVATIVE FREE 10 ML: 5 INJECTION INTRAVENOUS at 10:19

## 2021-06-14 RX ADMIN — ATORVASTATIN CALCIUM 40 MG: 40 TABLET, FILM COATED ORAL at 21:54

## 2021-06-14 RX ADMIN — CEFTRIAXONE SODIUM 2000 MG: 2 INJECTION, POWDER, FOR SOLUTION INTRAMUSCULAR; INTRAVENOUS at 16:47

## 2021-06-14 RX ADMIN — DICYCLOMINE HYDROCHLORIDE 10 MG: 10 CAPSULE ORAL at 16:47

## 2021-06-14 RX ADMIN — PREGABALIN 150 MG: 75 CAPSULE ORAL at 10:18

## 2021-06-14 RX ADMIN — PANTOPRAZOLE SODIUM 40 MG: 40 TABLET, DELAYED RELEASE ORAL at 06:36

## 2021-06-14 RX ADMIN — DICYCLOMINE HYDROCHLORIDE 10 MG: 10 CAPSULE ORAL at 06:36

## 2021-06-14 RX ADMIN — SODIUM CHLORIDE, PRESERVATIVE FREE 10 ML: 5 INJECTION INTRAVENOUS at 21:54

## 2021-06-14 RX ADMIN — INSULIN LISPRO 2 UNITS: 100 INJECTION, SOLUTION INTRAVENOUS; SUBCUTANEOUS at 16:50

## 2021-06-14 ASSESSMENT — PAIN DESCRIPTION - PAIN TYPE: TYPE: ACUTE PAIN

## 2021-06-14 ASSESSMENT — PAIN SCALES - GENERAL
PAINLEVEL_OUTOF10: 0
PAINLEVEL_OUTOF10: 0
PAINLEVEL_OUTOF10: 9

## 2021-06-14 ASSESSMENT — PAIN DESCRIPTION - PROGRESSION
CLINICAL_PROGRESSION: GRADUALLY WORSENING

## 2021-06-14 ASSESSMENT — PAIN DESCRIPTION - DESCRIPTORS: DESCRIPTORS: ACHING

## 2021-06-14 ASSESSMENT — PAIN DESCRIPTION - FREQUENCY: FREQUENCY: CONTINUOUS

## 2021-06-14 ASSESSMENT — PAIN - FUNCTIONAL ASSESSMENT: PAIN_FUNCTIONAL_ASSESSMENT: ACTIVITIES ARE NOT PREVENTED

## 2021-06-14 ASSESSMENT — PAIN DESCRIPTION - ONSET: ONSET: ON-GOING

## 2021-06-14 ASSESSMENT — PAIN DESCRIPTION - ORIENTATION: ORIENTATION: RIGHT;LEFT

## 2021-06-14 ASSESSMENT — PAIN DESCRIPTION - DIRECTION: RADIATING_TOWARDS: ACROSS

## 2021-06-14 ASSESSMENT — PAIN DESCRIPTION - LOCATION: LOCATION: FOOT

## 2021-06-14 NOTE — PROGRESS NOTES
PROCEDURE PERFORMED: Tunnel PICC     INFORMED CONSENT:  Obtained prior to procedure with Dr Kirtland Ahumada and placed in chart. AYANNA SCORE PRE PROCEDURE:  9    PT TRANSPORTED FROM: 1119          TO THE IR ROOM:  Large    PT IN THE ROOM AT WHAT TIME: 1425    ASSESSMENT: A&Ox4. Pt verbalizes understanding of procedure. TIME OUT COMPLETE: 1443    BARRIER PRECAUTIONS & STERILE TECHNIQUE:               Pt transferred to the table and positioned supine for procedure. Warm blankets given. Pt placed on Cardiac Monitor. Pt prepped and draped in a sterile fashion with chlorhexadine. PAIN/LOCAL ANESTHESIA/SEDATION MANAGEMENT:           Local: Lidocaine given by Dr Kirtland Ahumada          Sedation:              Fentanyl:             Versed:     INTRAOPERATIVE:           ACCESS TIME: 1445          US/FLUORO: US/Fluoro Used          WIRE USED:           SHEATH USED:           CATHETER USED: Medcomp 6fx60 cm dual lumen w/cuff          FINAL IMAGE TAKEN TO CONFIRM PLACEMENT OF:           CONTRAST/CC:    PICC length trimmed to 14 cm, blood return observed. Ok to use per Dr. Fredo Christina: bio patch with occlusive dressing applied by Jyoti Steve RN    EBL: Less than 5cc per Dr. Dyan Naranjok: placed under fluoro. COMPLICATIONS/ OUTCOME: No complications. Patient tolerated procedure well.     STAFF PRESENT DURING PROCEDURE: Dr Jacquie Brennan RN/Malcolm RT, Joya Reese RN    AYANNA SCORE POST PROCEDURE:  9    REPORT CALLED TO: Marin Quevedo RN on 1N    PT LEFT ROOM AT WHAT TIME: 5751

## 2021-06-14 NOTE — PROGRESS NOTES
Infectious Disease Progress Note  2021   Patient Name: Jeff Louie : 1950   Impression  · Left Foot Infected DFU with OM:  ? Right Foot DFU:  § Afebrile, no leukocytosis  § -Left Foot wound culture: Proteus mirabilis, MRSA, Corynebacterium striatum  § -Blood cultures 0/2-NGTD  § -XR Foot Left: Ulceration involving the stump of the left great toe. Bony irregularity of the proximal phalangeal stump, raising the possibilility of OM  § -XR Foot Right: no acute periostitis or bone destruction. § -Doppler BLE: no evidence of DVT  § -Duplex: imp of influx disease  § Dr. Valentino Ley, general surgery, onboard, imp of may require BKA (unilateral or bilateral) if unable to heal wounds, will follow vascular workup along with ABX regimen, plan for debridement     · IDDM with Peripheral Neuropathy:  § -HbAIC 7.9     ? Allergy to PCN (hives, facial swelling)    ? Syncopal Episode 6/10     ? Tobacco Abuse: Cigar     ? Severe PVD:  § Dr. Angeli Sexton onboard  § -Duplex shows imp of inflow disease  § Plan for angiogram per left radial     ? NANCY on CKD3:  § Dr. Feng Albrecht onboard     ? Chest Pain/ Chronic HFrEF/ ICD x 6 years/ CAD:  § Dr. Bjorn Burns onboard     ? Morbid Obesity:  BMI: 35.26     ? HLD     ? COPD/ STACI, Oxygen NC PRN at home     · Multi-morbidity: per PMHx:  S/p left hallux amputation     Plan:  · Continue IV vancomycin, per pharmacy dosing, x 6 weeks (end date 21)  ? Continue IV ceftriaxone 2 gm q24h (end date 21)  ? Tunneled PICC right IJ as preferred per Dr. Feng Albrecht, order placed  ? Follow with plan for angiogram per Dr. Fred Mcneill, on hold due to NANCY, will plan as an OP  ? Surgical plans on hold due to NANCY, continue in wound care clinic, will plan as an OP for wound debridement per Dr. Valentino Ley   ?  Follow up in ID clinic in 1 week please  Weekly labs drawn on Monday during the course of treatment  CBC with differential, CMP, ESR, CRP,Vancomycin Trough  Fax results to Attn: [facility]  ? # [phone] OK from ID to DC when ready after tunneled PICC placed    Ongoing Antimicrobial Therapy  Vancomycin 611-  Ceftriaxone 6/10-? Completed Antimicrobial Therapy  Meropenem 6/7-10  ? History:? Interval history noted. Chief complaint: Left foot infected DFU, possible OM. Right foot DFU. Denies n/v/d/f or untoward effects of antibiotics. Resting quietly. Physical Exam:  Vital Signs: /62   Pulse 62   Temp 97.8 °F (36.6 °C) (Oral)   Resp 16   Ht 6' (1.829 m)   Wt 275 lb 9.2 oz (125 kg)   SpO2 94%   BMI 37.37 kg/m²     Gen: alert and oriented X3, no distress  Skin: no stigmata of endocarditis  Wounds: C/D/I Right foot: right hallux with dried ulcerations, right 2nd toe dried ulcer. Left foot: Left Hallux amputation stump open wound draining purulent fluid, 2nd toe with ulceration draining purulent fluid. HEMT: AT/NC Oropharynx pink, moist, and without lesions or exudates; dentition in good state of repair  Eyes: PERRLA, EOMI, conjunctiva pink, sclera anicteric. Neck: Supple. Trachea midline. No LAD. Chest: no distress and CTA. Good air movement. Heart: RRR and no MRG. Abd: soft, non-distended, no tenderness, no hepatomegaly. Normoactive bowel sounds. Ext: no clubbing, cyanosis, or edema  Neuro: Mental status intact.  CN 2-12 intact and no focal sensory or motor deficits     Radiologic / Imaging / TESTING  6/7/21 XR Foot Left:  Impression   Ulceration involving the stump of the left great toe.  Bony irregularity of   the proximal phalangeal stump, raising the possibility of osteomyelitis.      6/7/21 XR Foot Right:  Impression   1.  No acute periostitis or bony destruction.      6/7/21 VL Dup Lower Extremity Venous Bilateral:  Impression   No evidence of DVT in either lower extremity above the knee.  Deep veins   below the knee are not visualized due to diffuse subcutaneous edema.      6/8/21 VL Dup Lower Extremity Arteries Bilateral:  Impression   Aortic or iliac inflow disease, with loss of normal appearing triphasic   waveforms at the common femoral arteries.       Moderate stenosis within the proximal SFA on the right, with poststenotic   reduced velocities noted within the remainder of the left lower extremity,   with loss of normal multiphasic waveforms suggesting at least moderate   disease.  No significant focal severe stenosis seen.  Three-vessel runoff to   the right foot.       Moderate deep femoral artery stenosis on the right, with no severe stenosis   seen within the left lower extremity.  However the distal PTA and peroneal   artery are occluded.  Loss of multiphasic waveforms related to at least   moderate diffuse disease.           Labs:    Recent Results (from the past 24 hour(s))   POCT Glucose    Collection Time: 06/13/21  4:08 PM   Result Value Ref Range    POC Glucose 221 (H) 70 - 99 MG/DL   POCT Glucose    Collection Time: 06/13/21  8:02 PM   Result Value Ref Range    POC Glucose 160 (H) 70 - 99 MG/DL   POCT Glucose    Collection Time: 06/14/21  6:29 AM   Result Value Ref Range    POC Glucose 153 (H) 70 - 99 MG/DL     CULTURE results: Invalid input(s): BLOOD CULTURE,  URINE CULTURE, SURGICAL CULTURE    Diagnosis:  Patient Active Problem List   Diagnosis    Chronic coronary artery disease    Automatic implantable cardioverter-defibrillator in situ    Chronic combined systolic and diastolic heart failure ([facility]    Chronic kidney disease, stage III (moderate) ([facility])    Mixed hyperlipidemia    Sick sinus syndrome ([facility])    Type 2 diabetes mellitus with diabetic polyneuropathy ([facility]    Spinal stenosis of lumbar region    Obesity, Class I, BMI 30-34.9    Postoperative hypertension    S/P partial colectomy    Tubulovillous adenoma of colon    Microalbuminuria    PVD (peripheral vascular disease) ([facility])    Limb ischemia    Necrotic toes ([facility])    Toe gangrene ([facility])    Diabetic foot infection ([facility])    Chronic kidney disease (CKD) stage G3a/A2, moderately decreased glomerular filtration rate (GFR) between 45-59 mL/min/1.73 square meter and albuminuria creatinine ratio between  mg/g ([facility])    DM (diabetes mellitus) ([address]    Edema    ICD (implantable cardioverter-defibrillator) battery depletion    Hyperkalemia    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[facility])    Ischemia of toe    Acute kidney injury ([address]    Fluid overload    DM (diabetes mellitus) ([address]    Hypertensive emergency    Precordial pain    Acute chest pain    Unstable angina ([facility])    Chronic kidney disease (CKD) stage G3a/A3, moderately decreased glomerular filtration rate (GFR) between 45-59 mL/min/1.73 square meter and albuminuria creatinine ratio greater than 300 mg/g ([facility])    Cardiomyopathy ([facility])    Hypertensive crisis    Diabetic neuropathy ([facility])    HTN (hypertension)    Epigastric pain    Acute on chronic congestive heart failure ([facility])    Bilateral lower extremity edema    Acute on chronic systolic CHF (congestive heart failure) ([facility])    Diabetic foot ulcer with osteomyelitis ([address]    Visit for wound check    Moderate malnutrition ([address]       Active Problems  Principal Problem:    Diabetic foot ulcer with osteomyelitis ([address]  Active Problems:    Visit for wound check    Moderate malnutrition ([address]  Resolved Problems:    * No resolved hospital problems. *    Electronically signed by: Electronically signed by MICKY Benitez CNP on 6/14/2021 at 11:31 AM

## 2021-06-14 NOTE — PROGRESS NOTES
distal disease/vessel occlusion on the left, likely has inflow disease per Vascular surgery.       Patient Active Problem List:     Chronic coronary artery disease     Automatic implantable cardioverter-defibrillator in situ     Chronic combined systolic and diastolic heart failure ([facility])     Chronic kidney disease, stage III (moderate) ([facility])     Mixed hyperlipidemia     Sick sinus syndrome ([facility])     Type 2 diabetes mellitus with diabetic polyneuropathy ([address]     Spinal stenosis of lumbar region     Obesity, Class I, BMI 30-34.9     Postoperative hypertension     S/P partial colectomy     Tubulovillous adenoma of colon     Microalbuminuria     PVD (peripheral vascular disease) ([facility])     Limb ischemia     Necrotic toes ([facility])     Toe gangrene ([facility])     Diabetic foot infection ([facility])     Chronic kidney disease (CKD) stage G3a/A2, moderately decreased glomerular filtration rate (GFR) between 45-59 mL/min/1.73 square meter and albuminuria creatinine ratio between  mg/g ([facility])     DM (diabetes mellitus) ([address]     Edema     ICD (implantable cardioverter-defibrillator) battery depletion     Hyperkalemia     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[facility])     Ischemia of toe     Acute kidney injury ([address]     Fluid overload     DM (diabetes mellitus) ([address]     Hypertensive emergency     Precordial pain     Acute chest pain     Unstable angina ([facility])     Chronic kidney disease (CKD) stage G3a/A3, moderately decreased glomerular filtration rate (GFR) between 45-59 mL/min/1.73 square meter and albuminuria creatinine ratio greater than 300 mg/g ([facility])     Cardiomyopathy ([facility])     Hypertensive crisis     Diabetic neuropathy ([facility])     HTN (hypertension)     Epigastric pain     Acute on chronic congestive heart failure ([facility])     Bilateral lower extremity edema     Acute on chronic systolic CHF (congestive heart failure) ([facility])     Diabetic foot ulcer with osteomyelitis ([address]     Visit for wound check     Moderate malnutrition ([address]      - will need bilateral foot debridement   - Ideally revascularization should be done prior to debridement if possible.   Without better blood supply I expect difficulty with healing  - Patient can discharge home with home health and follow up in Wound Care clinic and return for Vascular intervention and wound debridement as an outpatient  - will continue to follow while admitted    Lydia Faith MD

## 2021-06-14 NOTE — PROGRESS NOTES
Nephrology Progress Note  6/14/2021 8:49 AM        Subjective:   Admit Date: 6/7/2021  PCP: Lam Sher    Interval History: weekend events briefly reviewed     Diet: better    ROS:  No confusion   UOP- per pt better       Data:     Current meds:    vancomycin (VANCOCIN) intermittent dosing (placeholder)   Other See Admin Instructions    insulin lispro  0-12 Units Subcutaneous TID WC    insulin lispro  0-6 Units Subcutaneous Nightly    cefTRIAXone (ROCEPHIN) IV  2,000 mg Intravenous Q24H    amLODIPine  10 mg Oral Daily    pantoprazole  40 mg Oral QAM AC    atorvastatin  40 mg Oral Nightly    carvedilol  3.125 mg Oral BID    [Held by provider] clopidogrel  75 mg Oral Daily    dicyclomine  10 mg Oral 4x Daily AC & HS    pregabalin  150 mg Oral TID    sodium chloride flush  5-40 mL Intravenous BID      sodium chloride 50 mL/hr at 06/11/21 1158    sodium chloride Stopped (06/10/21 0109)    dextrose           I/O last 3 completed shifts: In: 260 [I.V.:10; IV Piggyback:250]  Out: -     CBC:   Recent Labs     06/12/21  1024   WBC 6.7   HGB 8.4*             Recent Labs     06/11/21  2217 06/12/21  1024 06/13/21  0804   NA  --  140 138   K  --  4.7 5.0   CL  --  103 100   CO2  --  29 30   BUN  --  27* 30*   CREATININE 2.3* 2.2* 2.2*   GLUCOSE  --  161* 156*       Lab Results   Component Value Date    CALCIUM 7.0 (L) 06/13/2021    PHOS 4.5 06/12/2021       Objective:     Vitals: /66   Pulse 61   Temp 97.7 °F (36.5 °C) (Oral)   Resp 18   Ht 6' (1.829 m)   Wt 275 lb 9.2 oz (125 kg)   SpO2 92%   BMI 37.37 kg/m²     General appearance:  No ac distress- no confusion   HEENT:  ++ conj pallor  Neck:  supple  Lungs:  + rhonhci  Heart:  Seems RRR  Abdomen: soft  Extremities:  ++ ch edema - Lt ankle wound - leg       Problem List :         Impression :     1. NANCY- CKD stage 3 b A3- stable off loop   2. Ac skin/ soft tissue amd bone infection with abx with underlying ASCVD  3.  HTN/ CMP- seems compnesated    Recommendation/Plan  :     1. D/c NS  2. Ok to d/c from my standpoint and plan for  angio- surgery as an  out pt   3. No loop for now   4. Low salt   5. He may need vascular access for prolong out pt abx   6. As he trevor high risk form ESKD and needing AVF/AVG- avoid PICC- if necessary salome Rt IJ jose PICC   7. CMP in 1-2 wks  8.  F/u with me in 2-3 wks       Kelby Maldonado MD MD

## 2021-06-14 NOTE — PROGRESS NOTES
Hospitalist Progress Note      Name:  Santos Kelly /Age/Sex: 1950  (79 y.o. male)   MRN & CSN:  [id number] & [id number] Admission Date/Time: 2021  7:58 PM   Location:  [address] [place] [address] PCP: Dillon Montes Day: 8    Assessment and Plan:   Santos Kelly is a 79 y.o.  male diabetes, CAD, s/p stent, PAD, ischemic cardiomyopathy s/p ICD, CKD 3A, hypertension, chronic lymphedema, nicotine dependence who presents with left foot pain, falls. Patient also reported discharge and Maggots from the left foot and was diagnosed with Diabetic foot ulcer with osteomyelitis ([facility]       1. Syncope and collapse: On 6/10/2021 in the hospital.  No further episodes. Echo with severe TR and RVSP of 68, low EF of 45 to 50%  No further cardiac testing  Patient is cleared for surgical procedure. 2.  Acute kidney injury superimposed on CKD 3: Not POA  Creatinine peaked at 2.3, down to 2.2 today, baseline 1.4  Nephrology following  Vancomycin being dosed renally  Angiogram on hold due to NANCY. Noted nephrology recommendations to proceed with angiogram outpatient    3. Infected left diabetic foot ulcer: With concern for underlying osteomyelitis  Surgical plans for debridement/amputation on hold due to inability to obtain vascular clearance. Angiograms on hold due to NANCY  Wound cultures growing MRSA and Proteus  IV antibiotic streamlined to vancomycin and Rocephin (initially Vanco and meropenem). Appreciate ID. Noted ID recommending 6 weeks of antibiotics. Preoperative clearance: Has moderate risk with a 15% risk of death or major adverse cardiac event in the next 30 days postoperatively      4. Severe PAD s/p previous intervention, s/p amputation right third toe, right fifth toe. Vascular and general surgery following. Continue to hold Plavix. Pain control with oxycodone, Tylenol, IV morphine    5. Moderate malnutrition: In the context of chronic illness  Encourage completing meals. Nutritional supplementation when able    6. Hypertension: On Coreg 3.125 mg twice daily, Cardura 4 mg twice daily, amlodipine 10 mg daily  Cardura discontinued today due to syncopal episode yesterday    7. 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 Continue Lyrica 150 mg 3 times daily    8. Chronic systolic CHF: Stable  Elevated proBNP is likely due to CKD    9. Dyslipidemia: Continue Lipitor 40 mg daily. Diet ADULT DIET; Regular; 4 carb choices (60 gm/meal); Low Potassium (Less than 3000 mg/day); Low Phosphorus (Less than 1000 mg) for possible surgery   DVT Prophylaxis [] Lovenox, []  Heparin, [] SCDs, [] Ambulation subcu Lovenox on hold for possible surgery   GI Prophylaxis [x] PPI,  [] H2 Blocker,  [] Carafate,  [] Diet/Tube Feeds   Code Status Full Code   Disposition Patient requires continued admission due to  diabetic foot ulcer with gangrene-we will likely need amputation   MDM [] Low, [] Moderate,[x]  High  Patient's risk as above due to diabetic foot ulcer with gangrene and possible osteo-     History of Present Illness:     Chief Complaint: Diabetic foot ulcer with osteomyelitis ([facility]  Jeff Louie is a 79 y.o.  male with a past medical history of ischemic and myopathy s/p ICD, CAD s/p stent, insulin-dependent type 2 diabetes, CKD 3A, hypertension, who presented to the hospital on account of intractable left foot pain and concern for infection. Patient has no complaints. His pain control is the same. He is disappointed that he is creatinine function has not improved enough for him to have these procedures. He would really like to get everything done while in-house as it is difficult for him to get around from home to the hospital.    Objective:        Intake/Output Summary (Last 24 hours) at 6/14/2021 1236  Last data filed at 6/13/2021 1835  Gross per 24 hour   Intake 250 ml   Output --   Net 250 ml      Vitals:   Vitals:    06/14/21 PRN  morphine, 2 mg, Q2H PRN   Or  morphine, 4 mg, Q2H PRN          Electronically signed by Cristiane Hendricks MD on 6/14/2021 at 12:36 PM

## 2021-06-14 NOTE — PROGRESS NOTES
PATIENT NAME: Denise Carrillo    TODAY'S DATE: 06/14/21    Reason for today's visit: PAD    SUBJECTIVE:    Pt with minimal complaints. OBJECTIVE:   VITALS:    Vitals:    06/14/21 0915   BP: 138/62   Pulse: 62   Resp: 16   Temp: 97.8 °F (36.6 °C)   SpO2: 94%     INTAKE/OUTPUT:     Patient Vitals for the past 96 hrs (Last 3 readings):   Weight   06/12/21 0555 275 lb 9.2 oz (125 kg)       EXAM:  Constitutional: Blood pressure 138/62, pulse 62, temperature 97.8 °F (36.6 °C), temperature source Oral, resp. rate 16, height 6' (1.829 m), weight 275 lb 9.2 oz (125 kg), SpO2 94 %. No apparent distress, appears stated age and cooperative. Neurologic: follows commands, no focal weakness noted   Lungs: Good respiratory effort. Clear to auscultation,   CV: Regular rate/ rhythm , 1+ peripheral edema, feet warm   GI: Soft, non-tender in all four quadrants, non-distended, + bowel sounds, spleen and liver no palpable masses   : bladder nondistended   MSK: no obvious deformity   Skin: warm, pink and dry bilat feet wrapped in gauze       Data:  CBC:   Recent Labs     06/12/21  1024   WBC 6.7   HGB 8.4*   HCT 28.9*        BMP:    Recent Labs     06/11/21  2217 06/12/21  1024 06/13/21  0804   NA  --  140 138   K  --  4.7 5.0   CL  --  103 100   CO2  --  29 30   BUN  --  27* 30*   CREATININE 2.3* 2.2* 2.2*   GLUCOSE  --  161* 156*     Hepatic: No results for input(s): AST, ALT, ALB, BILITOT, ALKPHOS in the last 72 hours. Mag:      Recent Labs     06/12/21  1024   MG 1.8      Phos:     Recent Labs     06/12/21  1024   PHOS 4.5      INR: No results for input(s): INR in the last 72 hours.     Radiology Review:       ASSESSMENT:  Patient Active Problem List   Diagnosis    Chronic coronary artery disease    Automatic implantable cardioverter-defibrillator in situ    Chronic combined systolic and diastolic heart failure (HCC)    Chronic kidney disease, stage III (moderate) (HCC)    Mixed hyperlipidemia    Sick sinus syndrome ([facility]    Type 2 diabetes mellitus with diabetic polyneuropathy ([facility]    Spinal stenosis of lumbar region    Obesity, Class I, BMI 30-34.9    Postoperative hypertension    S/P partial colectomy    Tubulovillous adenoma of colon    Microalbuminuria    PVD [facility]peripheral vascular disease) ([facility]    Limb ischemia    Necrotic toes (HCC)    Toe gangrene ([facility]    Diabetic foot infection ([facility]    Chronic kidney disease (CKD) stage G3a/A2, moderately decreased glomerular filtration rate (GFR) between 45-59 mL/min/1.73 square meter and albuminuria creatinine ratio between  mg/g ([facility])    DM (diabetes mellitus) ([facility]    Edema    ICD (implantable cardioverter-defibrillator) battery depletion    Hyperkalemia    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[facility])    Ischemia of toe    Acute kidney injury ([facility]    Fluid overload    DM (diabetes mellitus) ([facility]    Hypertensive emergency    Precordial pain    Acute chest pain    Unstable angina ([facility]    Chronic kidney disease (CKD) stage G3a/A3, moderately decreased glomerular filtration rate (GFR) between 45-59 mL/min/1.73 square meter and albuminuria creatinine ratio greater than 300 mg/g (HCC)    Cardiomyopathy (HCC)    Hypertensive crisis    Diabetic neuropathy ([facility]    HTN (hypertension)    Epigastric pain    Acute on chronic congestive heart failure ([facility])    Bilateral lower extremity edema    Acute on chronic systolic CHF (congestive heart failure) ([facility])    Diabetic foot ulcer with osteomyelitis ([facility]    Visit for wound check    Moderate malnutrition ([facility])       PAD    PLAN:     Creatinine remains 2.2. Will plan for OP angiogram with R radial access.      Jagdish Graff PA-C

## 2021-06-14 NOTE — CARE COORDINATION
Call to K[facility]. Pt is covered at 100%. [address] will be the home care that will be helping the Pt. [address] [facility] is not able to start care tomorrow. [facility]. If Pt is discharged after LSW has left please call Natalie Nugent [phone] at [facility] to notify of the discharge. Please make sure that the home care order is in [facility]. Notify shannon that the home care order is in [facility] [address]. She will notify [facility] of the discharge.

## 2021-06-14 NOTE — PROGRESS NOTES
[address]  consulted by Dr. Sheeba Castano for monitoring and adjustment. Indication for treatment: DFU, possible osteomyelitis  Goal trough: 15 mcg/mL  AUC/RJ: 400-600    Pertinent Laboratory Values:   Temp Readings from Last 3 Encounters:   06/13/21 97.7 °F (36.5 °C) (Oral)   03/11/21 97.8 °F (36.6 °C) (Oral)   01/06/21 98.3 °F (36.8 °C)     Recent Labs     06/12/21  1024   WBC 6.7     Recent Labs     06/11/21  2217 06/12/21  1024 06/13/21  0804   BUN  --  27* 30*   CREATININE 2.3* 2.2* 2.2*     Estimated Creatinine Clearance: 43 mL/min (A) (based on SCr of 2.2 mg/dL (H)).     Intake/Output Summary (Last 24 hours) at 6/14/2021 0917  Last data filed at 6/13/2021 1835  Gross per 24 hour   Intake 250 ml   Output --   Net 250 ml       Pertinent Cultures:  Date    Source    Results  06/07   Blood    Negative  06/07   Wound    Proteus mirabilis, MRSA, Corynebacterium striatum    Assessment:  · WBC WNL; patient is afebrile  · History of CKD stage III, now in NANCY  · Scr trends 1.4 -> 2.3. stable for now @ 2.2  · Day(s) of therapy: 8   · MRI pending, r/o osteomyelitis  · Vancomycin concentration:   · 06/09 - 15.6, therapeutic following 2 doses  · 6/12:  Missed level  · 6/13:  14.5, OK to re-dsoe    Plan:  · Patient now in NANCY; intermittent dosing based on levels while in NANCY  · Random level therapeutic @ 14.5  · Ordered Vancomycin 1250mg IV x 1 dose yesterday (6/13)  · Pharmacy will continue to monitor patient and adjust therapy as indicated    VANCOMYCIN CONCENTRATION SCHEDULED FOR 6/16 @ 0600     Thank you for the consult,  Sarath Camacho, José LuisD Candidate 2022 6/14/2021 9:17 AM    Helen Hunter PharmD, BCPS

## 2021-06-14 NOTE — CARE COORDINATION
Message from Kori/JOCELYN they are not in network with Pt insurance. Call to Soniya[facility]. She will work on getting the meds delivered and home care.

## 2021-06-15 VITALS
HEIGHT: 72 IN | DIASTOLIC BLOOD PRESSURE: 65 MMHG | RESPIRATION RATE: 18 BRPM | BODY MASS INDEX: 38.22 KG/M2 | HEART RATE: 66 BPM | TEMPERATURE: 98 F | OXYGEN SATURATION: 100 % | WEIGHT: 282.19 LBS | SYSTOLIC BLOOD PRESSURE: 149 MMHG

## 2021-06-15 LAB
ANION GAP SERPL CALCULATED.3IONS-SCNC: 7 MMOL/L (ref 4–16)
BUN BLDV-MCNC: 39 MG/DL (ref 6–23)
CALCIUM SERPL-MCNC: 7.2 MG/DL (ref 8.3–10.6)
CHLORIDE BLD-SCNC: 105 MMOL/L (ref 99–110)
CO2: 30 MMOL/L (ref 21–32)
CREAT SERPL-MCNC: 2.6 MG/DL (ref 0.9–1.3)
GFR AFRICAN AMERICAN: 30 ML/MIN/1.73M2
GFR NON-AFRICAN AMERICAN: 25 ML/MIN/1.73M2
GLUCOSE BLD-MCNC: 163 MG/DL (ref 70–99)
GLUCOSE BLD-MCNC: 164 MG/DL (ref 70–99)
GLUCOSE BLD-MCNC: 166 MG/DL (ref 70–99)
HIGH SENSITIVE C-REACTIVE PROTEIN: 16.6 MG/L
POTASSIUM SERPL-SCNC: 5.3 MMOL/L (ref 3.5–5.1)
SODIUM BLD-SCNC: 142 MMOL/L (ref 135–145)

## 2021-06-15 PROCEDURE — 2580000003 HC RX 258: Performed by: STUDENT IN AN ORGANIZED HEALTH CARE EDUCATION/TRAINING PROGRAM

## 2021-06-15 PROCEDURE — 99233 SBSQ HOSP IP/OBS HIGH 50: CPT | Performed by: NURSE PRACTITIONER

## 2021-06-15 PROCEDURE — 80048 BASIC METABOLIC PNL TOTAL CA: CPT

## 2021-06-15 PROCEDURE — 6370000000 HC RX 637 (ALT 250 FOR IP): Performed by: INTERNAL MEDICINE

## 2021-06-15 PROCEDURE — 36415 COLL VENOUS BLD VENIPUNCTURE: CPT

## 2021-06-15 PROCEDURE — 6360000002 HC RX W HCPCS: Performed by: INTERNAL MEDICINE

## 2021-06-15 PROCEDURE — 6370000000 HC RX 637 (ALT 250 FOR IP): Performed by: STUDENT IN AN ORGANIZED HEALTH CARE EDUCATION/TRAINING PROGRAM

## 2021-06-15 PROCEDURE — 2580000003 HC RX 258: Performed by: INTERNAL MEDICINE

## 2021-06-15 PROCEDURE — 86141 C-REACTIVE PROTEIN HS: CPT

## 2021-06-15 PROCEDURE — 94761 N-INVAS EAR/PLS OXIMETRY MLT: CPT

## 2021-06-15 PROCEDURE — 99211 OFF/OP EST MAY X REQ PHY/QHP: CPT

## 2021-06-15 PROCEDURE — 94150 VITAL CAPACITY TEST: CPT

## 2021-06-15 PROCEDURE — 82962 GLUCOSE BLOOD TEST: CPT

## 2021-06-15 RX ORDER — CHLORTHALIDONE 25 MG/1
50 TABLET ORAL DAILY
Status: DISCONTINUED | OUTPATIENT
Start: 2021-06-15 | End: 2021-06-15 | Stop reason: HOSPADM

## 2021-06-15 RX ORDER — CHLORTHALIDONE 50 MG/1
50 TABLET ORAL DAILY
Qty: 30 TABLET | Refills: 0 | Status: ON HOLD | OUTPATIENT
Start: 2021-06-15 | End: 2022-02-02 | Stop reason: HOSPADM

## 2021-06-15 RX ORDER — AMLODIPINE BESYLATE 10 MG/1
10 TABLET ORAL DAILY
Qty: 30 TABLET | Refills: 0 | Status: SHIPPED | OUTPATIENT
Start: 2021-06-15 | End: 2021-10-18

## 2021-06-15 RX ORDER — PREGABALIN 75 MG/1
75 CAPSULE ORAL 3 TIMES DAILY
Qty: 90 CAPSULE | Refills: 0 | Status: SHIPPED | OUTPATIENT
Start: 2021-06-15 | End: 2021-06-30

## 2021-06-15 RX ORDER — AMLODIPINE BESYLATE 10 MG/1
10 TABLET ORAL DAILY
Qty: 30 TABLET | Refills: 0 | Status: SHIPPED | OUTPATIENT
Start: 2021-06-15 | End: 2021-06-15

## 2021-06-15 RX ORDER — OXYCODONE HYDROCHLORIDE 5 MG/1
5 TABLET ORAL EVERY 4 HOURS PRN
Qty: 12 TABLET | Refills: 0 | Status: SHIPPED | OUTPATIENT
Start: 2021-06-15 | End: 2021-06-18

## 2021-06-15 RX ORDER — CEFTRIAXONE 500 MG/1
2000 INJECTION, POWDER, FOR SOLUTION INTRAMUSCULAR; INTRAVENOUS DAILY
Qty: 78000 MG | Refills: 0 | Status: SHIPPED | OUTPATIENT
Start: 2021-06-15 | End: 2021-06-30

## 2021-06-15 RX ADMIN — CARVEDILOL 3.12 MG: 3.12 TABLET, FILM COATED ORAL at 08:05

## 2021-06-15 RX ADMIN — CEFTRIAXONE SODIUM 2000 MG: 2 INJECTION, POWDER, FOR SOLUTION INTRAMUSCULAR; INTRAVENOUS at 12:58

## 2021-06-15 RX ADMIN — INSULIN LISPRO 2 UNITS: 100 INJECTION, SOLUTION INTRAVENOUS; SUBCUTANEOUS at 08:06

## 2021-06-15 RX ADMIN — OXYCODONE HYDROCHLORIDE 5 MG: 5 TABLET ORAL at 04:30

## 2021-06-15 RX ADMIN — DICYCLOMINE HYDROCHLORIDE 10 MG: 10 CAPSULE ORAL at 06:39

## 2021-06-15 RX ADMIN — INSULIN LISPRO 2 UNITS: 100 INJECTION, SOLUTION INTRAVENOUS; SUBCUTANEOUS at 11:44

## 2021-06-15 RX ADMIN — DICYCLOMINE HYDROCHLORIDE 10 MG: 10 CAPSULE ORAL at 11:44

## 2021-06-15 RX ADMIN — PREGABALIN 150 MG: 75 CAPSULE ORAL at 08:05

## 2021-06-15 RX ADMIN — AMLODIPINE BESYLATE 10 MG: 10 TABLET ORAL at 08:05

## 2021-06-15 RX ADMIN — SODIUM CHLORIDE, PRESERVATIVE FREE 10 ML: 5 INJECTION INTRAVENOUS at 08:05

## 2021-06-15 RX ADMIN — CHLORTHALIDONE 50 MG: 25 TABLET ORAL at 11:44

## 2021-06-15 RX ADMIN — PANTOPRAZOLE SODIUM 40 MG: 40 TABLET, DELAYED RELEASE ORAL at 06:39

## 2021-06-15 ASSESSMENT — PAIN DESCRIPTION - PAIN TYPE: TYPE: CHRONIC PAIN

## 2021-06-15 ASSESSMENT — PAIN SCALES - GENERAL
PAINLEVEL_OUTOF10: 8
PAINLEVEL_OUTOF10: 8

## 2021-06-15 ASSESSMENT — PAIN DESCRIPTION - ORIENTATION: ORIENTATION: RIGHT;LEFT

## 2021-06-15 ASSESSMENT — PAIN DESCRIPTION - LOCATION: LOCATION: FOOT

## 2021-06-15 NOTE — PROGRESS NOTES
results to Attn: Brett [facility] Staff  ? # [phone] from ID to DC when ready after tunneled PICC placed    Ongoing Antimicrobial Therapy  Vancomycin 611-  Ceftriaxone 6/10-? Completed Antimicrobial Therapy  Meropenem 6/7-10  ? History:? Interval history noted. Chief complaint: Left foot infected DFU, possible OM. Right foot DFU. Denies n/v/d/f or untoward effects of antibiotics. Resting quietly. Physical Exam:  Vital Signs: /68   Pulse 61   Temp 98.1 °F (36.7 °C) (Oral)   Resp 19   Ht 6' (1.829 m)   Wt 282 lb 3 oz (128 kg)   SpO2 95%   BMI 38.27 kg/m²     Gen: alert and oriented X3, no distress  Skin: no stigmata of endocarditis  Wounds: C/D/I Right foot: right hallux with dried ulcerations, right 2nd toe dried ulcer. Left foot: Left Hallux amputation stump open wound draining purulent fluid, 2nd toe with ulceration draining purulent fluid. HEMT: AT/NC Oropharynx pink, moist, and without lesions or exudates; dentition in good state of repair  Eyes: PERRLA, EOMI, conjunctiva pink, sclera anicteric. Neck: Supple. Trachea midline. No LAD. Chest: no distress and CTA. Good air movement. Heart: RRR and no MRG. Abd: soft, non-distended, no tenderness, no hepatomegaly. Normoactive bowel sounds. Ext: no clubbing, cyanosis, or edema  Tunneled PICC: RIJ intact without erythema or edema. Neuro: Mental status intact.  CN 2-12 intact and no focal sensory or motor deficits     Radiologic / Imaging / TESTING  6/7/21 XR Foot Left:  Impression   Ulceration involving the stump of the left great toe.  Bony irregularity of   the proximal phalangeal stump, raising the possibility of osteomyelitis.      6/7/21 XR Foot Right:  Impression   1.  No acute periostitis or bony destruction.      6/7/21 VL Dup Lower Extremity Venous Bilateral:  Impression   No evidence of DVT in either lower extremity above the knee.  Deep veins   below the knee are not visualized due to diffuse subcutaneous edema.      6/8/21 VL Dup Lower Extremity Arteries Bilateral:  Impression   Aortic or iliac inflow disease, with loss of normal appearing triphasic   waveforms at the common femoral arteries.       Moderate stenosis within the proximal SFA on the right, with poststenotic   reduced velocities noted within the remainder of the left lower extremity,   with loss of normal multiphasic waveforms suggesting at least moderate   disease.  No significant focal severe stenosis seen.  Three-vessel runoff to   the right foot.       Moderate deep femoral artery stenosis on the right, with no severe stenosis   seen within the left lower extremity.  However the distal PTA and peroneal   artery are occluded.  Loss of multiphasic waveforms related to at least   moderate diffuse disease. 6/14/21 IR Tunneled CVC:  Impression   Successful ultrasound and fluoroscopy guided right IJ tunneled central venous   catheter placement and ready to use for infusion.           Labs:    Recent Results (from the past 24 hour(s))   POCT Glucose    Collection Time: 06/14/21  4:32 PM   Result Value Ref Range    POC Glucose 164 (H) 70 - 99 MG/DL   POCT Glucose    Collection Time: 06/14/21  8:33 PM   Result Value Ref Range    POC Glucose 180 (H) 70 - 99 MG/DL   POCT Glucose    Collection Time: 06/15/21  6:47 AM   Result Value Ref Range    POC Glucose 166 (H) 70 - 99 MG/DL   C-Reactive Protein    Collection Time: 06/15/21  8:54 AM   Result Value Ref Range    CRP, High Sensitivity 16.6 mg/L   Basic metabolic panel    Collection Time: 06/15/21  8:54 AM   Result Value Ref Range    Sodium 142 135 - 145 MMOL/L    Potassium 5.3 (H) 3.5 - 5.1 MMOL/L    Chloride 105 99 - 110 mMol/L    CO2 30 21 - 32 MMOL/L    Anion Gap 7 4 - 16    BUN 39 (H) 6 - 23 MG/DL    CREATININE 2.6 (H) 0.9 - 1.3 MG/DL    Glucose 163 (H) 70 - 99 MG/DL    Calcium 7.2 (L) 8.3 - 10.6 MG/DL    GFR Non- 25 (L) >60 mL/min/1.73m2    GFR  30 (L) >60 mL/min/1.73m2   POCT Glucose    Collection Time: 06/15/21 11:04 AM   Result Value Ref Range    POC Glucose 164 (H) 70 - 99 MG/DL     CULTURE results: Invalid input(s): BLOOD CULTURE,  URINE CULTURE, SURGICAL CULTURE    Diagnosis:  Patient Active Problem List   Diagnosis    Chronic coronary artery disease    Automatic implantable cardioverter-defibrillator in situ    Chronic combined systolic and diastolic heart failure (HCC)    Chronic kidney disease, stage III (moderate) ([facility])    Mixed hyperlipidemia    Sick sinus syndrome ([facility]    Type 2 diabetes mellitus with diabetic polyneuropathy ([facility]    Spinal stenosis of lumbar region    Obesity, Class I, BMI 30-34.9    Postoperative hypertension    S/P partial colectomy    Tubulovillous adenoma of colon    Microalbuminuria    PVD (peripheral vascular disease) ([facility])    Limb ischemia    Necrotic toes ([facility])    Toe gangrene ([facility])    Diabetic foot infection ([facility])    Chronic kidney disease (CKD) stage G3a/A2, moderately decreased glomerular filtration rate (GFR) between 45-59 mL/min/1.73 square meter and albuminuria creatinine ratio between  mg/g ([facility])    DM (diabetes mellitus) ([facility])    Edema    ICD (implantable cardioverter-defibrillator) battery depletion    Hyperkalemia    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[facility])    Ischemia of toe    Acute kidney injury ([facility])    Fluid overload    DM (diabetes mellitus) ([facility]    Hypertensive emergency    Precordial pain    Acute chest pain    Unstable angina ([facility])    Chronic kidney disease (CKD) stage G3a/A3, moderately decreased glomerular filtration rate (GFR) between 45-59 mL/min/1.73 square meter and albuminuria creatinine ratio greater than 300 mg/g ([facility])    Cardiomyopathy ([facility]    Hypertensive crisis    Diabetic neuropathy ([facility]    HTN (hypertension)    Epigastric pain    Acute on chronic congestive heart failure (HCC)    Bilateral lower extremity edema    Acute on chronic systolic CHF (congestive heart failure) ([address]    Diabetic foot ulcer with osteomyelitis ([address]    Visit for wound check    Moderate malnutrition ([address]    Long term (current) use of antibiotics       Active Problems  Principal Problem:    Diabetic foot ulcer with osteomyelitis ([address]  Active Problems:    Visit for wound check    Moderate malnutrition ([address]    Long term (current) use of antibiotics  Resolved Problems:    * No resolved hospital problems. *    Electronically signed by: Electronically signed by MICKY Bahena CNP on 6/15/2021 at 11:46 AM

## 2021-06-15 NOTE — PROGRESS NOTES
[address] [facility]  consulted by Dr. Rashad Mccloud for monitoring and adjustment. Indication for treatment: DFU, possible osteomyelitis  Goal trough: 15 mcg/mL  AUC/RJ: 400-600    Pertinent Laboratory Values:   Temp Readings from Last 3 Encounters:   06/15/21 98.1 °F (36.7 °C) (Oral)   03/11/21 97.8 °F (36.6 °C) (Oral)   01/06/21 98.3 °F (36.8 °C)     Recent Labs     06/12/21  1024   WBC 6.7     Recent Labs     06/12/21  1024 06/13/21  0804 06/14/21  0827   BUN 27* 30* 34*   CREATININE 2.2* 2.2* 2.3*     Estimated Creatinine Clearance: 41 mL/min (A) (based on SCr of 2.3 mg/dL (H)).     Intake/Output Summary (Last 24 hours) at 6/15/2021 6369  Last data filed at 6/15/2021 0805  Gross per 24 hour   Intake 10 ml   Output --   Net 10 ml       Pertinent Cultures:  Date    Source    Results  06/07   Blood    Negative  06/07   Wound    Proteus mirabilis, MRSA, Corynebacterium striatum    Assessment:  · WBC WNL; patient is afebrile  · History of CKD stage III, now in NANCY  · Scr trends 1.4 -> 2.3. stable for now @ 2.2  · Day(s) of therapy: 9   · Vancomycin concentration:   · 06/09 - 15.6, therapeutic following 2 doses  · 6/12:  Missed level  · 6/13:  14.5, OK to re-dose    Plan:  · Patient now in NANCY; intermittent dosing based on levels while in NANCY  · Ordered Vancomycin 1250mg IV x 1 dose (6/13)  · Pharmacy will continue to monitor patient and adjust therapy as indicated    VANCOMYCIN CONCENTRATION SCHEDULED FOR 6/16 @ 0600     Thank you for the consult,  [place], PharmD Candidate 2022   6/15/2021 9:18 AM    Brian Kaiser PharmD, BCPS

## 2021-06-15 NOTE — FLOWSHEET NOTE
Patient given discharge instructions, voices understanding. Patient given paper Rx, copies in chart. Patient denies questions/concerns. PICC dressing to Right chest changed per sterile technique. Patient awaiting family to transport.

## 2021-06-15 NOTE — CONSULTS
[address] [address] Continence Nurse  Consult Note       Jeff Shock  AGE: 79 y.o. GENDER: male  : 1950  TODAY'S DATE:  6/15/2021    Subjective:     Reason for CWOCN Evaluation and Assessment: wound reassessment      Jeff Shock is a 79 y.o. male referred by:   [x] Physician  [] Nursing  [] Other:     Wound Identification:  Wound Type: arterial and diabetic  Contributing Factors: diabetes, poor glucose control, decreased mobility, obesity and arterial insufficiency        PAST MEDICAL HISTORY        Diagnosis Date    Acid reflux     Acute MI ([address] ,     Arthritis     Back    Broken teeth     Upper Front    CAD (coronary artery disease)     Se[facility] Dr. Sia Calderon [facility])     per old chart    CH[facility]congestive heart failure) [facility]     Chronic back pain     Chronic kidney disease     STAGE 3 KIDNEY FAILURE- \"from my diabetes- do not follow with any one- have seen Dr Feng Albrecht in the past\"    Diabetes mellitus ([facility] Dx 1965    per old chart pt has been diabetic since age 13    Diabetic neuropathy ([facility]     \"in my feet\"    H/O cardiovascular stress test 2016    H/O Doppler ultrasound 2018    Moderate disease of the right lower extremity with an JALEN of 0.72.   Moderate to severe disease of the left lower extremity with an JALEN of 0.55.   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    Neuropathy     both feet    neuropathy     PAD (peripheral artery disease) ([address] 2018    PVD (peripheral vascular disease) ([address]     Sick sinus syndrome ([address]     Sleep apnea     \"sleep study 3 yrs ago- could not tolerate the cpap made me too dry\"    Spinal stenosis     Teeth missing     Upper And Lower    Type 2 diabetes mellitus without complication ([address]        PAST SURGICAL HISTORY    Past Surgical History:   Procedure Laterality Date    CARDIAC CATHETERIZATION      per old chart done 10/2014    CARDIAC CATHETERIZATION  07/14/2017    with angiography of leg    CARDIAC CATHETERIZATION  11/20/2018    PATENT STENTS OF ALL THREE MAJOR VESSELS    CARDIAC DEFIBRILLATOR PLACEMENT  06/04/2010    [employer] [address] Defibrillator Implanted    COLECTOMY Right 08/26/2016    laparascopic; robotic assisted    COLONOSCOPY  08/04/2016    CORONARY ANGIOPLASTY      \"15 Heart Stents\"    CORONARY ANGIOPLASTY WITH STENT PLACEMENT      per old chart had angio with stent to circumflex and obtuse marginal artery at [facility] 5/2010( old chart also gives hx of stent placement done 2000,2004 and 2005)    DENTAL SURGERY      Teeth Extracted In Past    IR TUNNELED CATHETER PLACEMENT GREATER THAN 5 YEARS  6/14/2021    IR TUNNELED CATHETER PLACEMENT GREATER THAN 5 YEARS 6/14/2021 [facility] SPECIAL PROCEDURES    PACEMAKER PLACEMENT  06/04/2010    [employer] [address] Defibrillator Implanted    TOE AMPUTATION Right 09/12/2017    Rt 3rd toe    TOE AMPUTATION Right 01/09/2018     Right 5th toe amputation and Toenails trimmed left 2,3,4 and 5th toes    TOE AMPUTATION Left 12/26/2020    LEFT GREAT TOE AMPUTATION performed by Halina Dickerson MD at [facility]      per old chart had balloon angioplasty right superfical femoral artery,right popliteal artery,,right ant.tibial artery, right tibioperoneal trunk, and right post.tibial artery wna stent placement right popliteal artery and superfical femoral artery 7/2012       FAMILY HISTORY    Family History   Problem Relation Age of Onset    Diabetes Mother     Stroke Mother     High Blood Pressure Mother     Vision Loss Mother     Cancer Father         Prostate Cancer  Diabetes Sister     Neuropathy Sister     Other Sister         \"Breathing Problems\"    Heart Disease Sister     Early Death Sister 62        Heart Complications    Cancer Brother         \"Stomach Cancer\"    High Blood Pressure Brother     Diabetes Brother     Heart Disease Brother     High Blood Pressure Brother     Cancer Son         \"Testicle Cancer\"       SOCIAL HISTORY    Social History     Tobacco Use    Smoking status: Current Some Day Smoker     Packs/day: 0.10     Years: 36.00     Pack years: 3.60     Types: Cigars     Start date: 1/1/1980    Smokeless tobacco: Never Used   Vaping Use    Vaping Use: Never used   Substance Use Topics    Alcohol use: No    Drug use: Yes     Types: Marijuana       ALLERGIES    Allergies   Allergen Reactions    Pcn [Penicillins] Hives    Fentanyl Itching       MEDICATIONS    No current facility-administered medications on file prior to encounter.      Current Outpatient Medications on File Prior to Encounter   Medication Sig Dispense Refill    atorvastatin (LIPITOR) 40 MG tablet Take 1 tablet by mouth nightly 30 tablet 3    carvedilol (COREG) 3.125 MG tablet Take 1 tablet by mouth 2 times daily 60 tablet 3    albuterol sulfate HFA (VENTOLIN HFA) 108 (90 Base) MCG/ACT inhaler Inhale 2 puffs into the lungs every 4 hours as needed for Wheezing 1 Inhaler 3    Calcium Alginate (ALGICELL CALCIUM DRESSING 4\"X8) MISC Apply 1 Device topically daily 30 each 3    PROMOGRAN COREY WOUND DRESSING MATRIX Apply topically Apply to left daily and cover with gauze 1 Package 3    clopidogrel (PLAVIX) 75 MG tablet Take 1 tablet by mouth daily 30 tablet 11         Objective:      /68   Pulse 61   Temp 98.1 °F (36.7 °C) (Oral)   Resp 19   Ht 6' (1.829 m)   Wt 282 lb 3 oz (128 kg)   SpO2 95%   BMI 38.27 kg/m²   Pablo Risk Score: Pablo Scale Score: 19    LABS    CBC:   Lab Results   Component Value Date    WBC 6.7 06/12/2021    RBC 3.03 06/12/2021    HGB 8.4 (cm^2) 2.2 cm^2 06/15/21 0910   Change in Wound Size % (l*w) 23.61 06/15/21 0910   Wound Volume (cm^3) 0.44 cm^3 06/15/21 0910   Wound Healing % 49 06/15/21 0910   Distance Tunneling (cm) 0 cm 06/15/21 0910   Tunneling Position ___ O'Clock 0 06/15/21 0910   Undermining Starts ___ O'Clock 0 06/15/21 0910   Undermining Ends___ O'Clock 0 06/15/21 0910   Undermining Maxium Distance (cm) 0 06/15/21 0910   Wound Assessment Slough;Pink/red 06/15/21 0910   Drainage Amount Small 06/15/21 0910   Drainage Description Serosanguinous 06/15/21 0910   Odor None 06/15/21 0910   Dina-wound Assessment Dry/flaky 06/15/21 0910   Margins Defined edges 06/15/21 0910   Wound Thickness Description not for Pressure Injury Full thickness 06/15/21 0910   Number of days: 6       Response to treatment:  Well tolerated by patient. Pain Assessment:  Severity:  none  Quality of pain: na  Wound Pain Timing/Severity: na  Premedicated: no    Plan:     Plan of Care: Wound 06/08/21 Toe (Comment  which one) Right great toe-Dressing/Treatment:  (betadine moist gauze, gauze, conform)  Wound 12/10/20 Left great toe -Dressing/Treatment:  (betadine moist gauze, abd, kerlix)  Wound 06/08/21 Toe (Comment  which one) Left 2nd toe-Dressing/Treatment:  (betadine moist gauze, abd, kerlix)     Pt in bed. Agreeable to wound reassessment. Dr. Jordana Kramer following pt-no debridement until revascularization and to follow up at outpatient wound clinic after discharge. Diabetic/arterial wounds to right great toe and left great and 2nd toes. Pictures and measurements taken. Stable. Treatment applied as above. Heels and sacrum intact. Atmos air pump already on mattress. Gave pt information on outpatient wound clinic. Updated nurse. Pt is at mild risk for skin breakdown AEB Pablo. Follow Pablo orders.      Specialty Bed Required : yes  [] Low Air Loss   [x] Pressure Redistribution  [] Fluid Immersion  [] Bariatric  [] Total Pressure Relief  [] Other:     Discharge

## 2021-06-15 NOTE — DISCHARGE INSTR - DIET
Telephone Encounter by Cristin Hairston NCMA at 02/02/18 10:53 AM     Author:  Cristin Hairston NCMA Service:  (none) Author Type:  Certified Medical Assistant     Filed:  02/02/18 10:57 AM Encounter Date:  2/2/2018 Status:  Signed     :  Cristin Hairston NCMA (Certified Medical Assistant)            This was sent to the pharmacy 12/08/17 12 inhalers no refills. Last office visit 01/17/18. Medication discontinued 01/17/18. Sent to provider.[AH1.1M]       Revision History        User Key Date/Time User Provider Type Action    > AH1.1 02/02/18 10:57 AM Cristin Hairston NCMA Certified Medical Assistant Sign    M - Manual              Good nutrition is important when healing from an illness, injury, or surgery. Follow any nutrition recommendations given to you during your hospital stay.  If you were given an oral nutrition supplement while in the hospital, continue to take this supplement at home. You can take it with meals, in-between meals, and/or before bedtime. These supplements can be purchased at most local grocery stores, pharmacies, and chain super-stores.  If you have any questions about your diet or nutrition, call the hospital and ask for the dietitian.       Resume pre-admission diet as tolerated

## 2021-06-15 NOTE — DISCHARGE SUMMARY
6/10/2021. Suspect secondary to autonomic insufficiency in the setting of longstanding diabetes. Echocardiogram reveals severe tricuspid regurgitation, RVSP of 68, ejection fraction 45 to 50%. From cardiology standpoint, no further cardiac testing. Patient has been counseled about importance of acclimatization. 3. Acute kidney injury on CKD stage III, not present on admission. Creatinine peaked at 2.3 and has remained stable between 2.2 and 2.3, baseline around 1.4. Patient was on normal saline which has since been discontinued. Nephrology, mekhi for discharge with plan to follow-up with nephrology as outpatient. 4. Severe peripheral arterial disease. Status post previous intervention, status post amputation of right third toe, right fifth toe. Patient will follow up with vascular surgery as outpatient. 5. Moderate malnutrition in the context of chronic illness. Continue to encourage p.o. intake, nutritional supplementation when able. 6. Essential hypertension. Continue antihypertensive medications as ordered. Cardura has been discontinued due to syncope. 7. Other comorbidities: History of diabetes with diabetic neuropathy, chronic systolic CHF, hyperlipidemia. Invasive procedures:  None. Discharge Diagnoses:   As noted above. Physical Exam: /68   Pulse 61   Temp 98.1 °F (36.7 °C) (Oral)   Resp 19   Ht 6' (1.829 m)   Wt 282 lb 3 oz (128 kg)   SpO2 95%   BMI 38.27 kg/m²   Gen/overall appearance: Not in acute distress. Alert. Oriented x3. Head: Normocephalic, atraumatic  Eyes: EOMI, good acuity  ENT: Oral mucosa moist  Neck: No JVD, thyromegaly  CVS: Nml S1S2, no MRG, RRR  Pulm: Clear bilaterally. No crackles/wheezes  Gastrointestinal: Soft, NT/ND, +BS  Musculoskeletal: Left foot ulcers, right great toe wound. Bilateral lower extremity lymphedema. Neuro: No focal deficit. Moves extremity spontaneously. Psychiatry: Appropriate affect. Not agitated.   Skin: Warm, dry with normal turgor. No rash  Capillary refill: Brisk,< 3 seconds   Peripheral Pulses: +2 palpable, equal bilaterally     Significant diagnostic studies that may require follow up:  ECHO Complete 2D W Doppler W Color    Result Date: 6/11/2021  Transthoracic Echocardiography Report (TTE)  Demographics   Patient Name       Loren Espinal       Date of Study       06/11/2021   Date of Birth      1950         Gender              Male   Age                79 year(s)         Race                Black   Patient Number     [id number]         Room Number         1119   Visit Number       [id number]   Corporate ID       [id number]   Accession Number   [id number]         Nissa Lambert RDMS   Ordering Physician Zari Moy MD           Physician           Veronica RAMOS  Procedure Type of Study   TTE procedure:ECHOCARDIOGRAM COMPLETE 2D W DOPPLER W COLOR. Procedure Date Date: 06/11/2021 Start: 09:54 AM Study Location: Portable Technical Quality: Good visualization Indications:Syncope. Patient Status: Routine Height: 72 inches Weight: 260 pounds BSA: 2.38 m2 BMI: 35.26 kg/m2 BP: 115/57 mmHg  Conclusions   Summary  Left ventricular systolic function is low normal.  Ejection fraction is visually estimated at 45-50%. Severely dilated left atrium. Severely dilated right ventricle. PPM wiring visualized within the right heart. Severe tricuspid regurgitation; RVSP: 68 mmHg. No evidence of any pericardial effusion.    Signature   ------------------------------------------------------------------  Electronically signed by Malina Silva MD  (Interpreting physician) on 06/11/2021 at 11:18 AM  ------------------------------------------------------------------   Findings   Left Ventricle  Left ventricular systolic function is low normal.  Ejection fraction is visually estimated at 45-50%. Mild left ventricular hypertrophy to the posterior wall. Left Atrium  Severely dilated left atrium. Right Atrium  PPM wiring visualized in right atrium. Right Ventricle  Severely dilated right ventricle. PPM wiring visualized within the right ventricle. Aortic Valve  Trace aortic regurgitation. Mitral Valve  Structurally normal mitral valve. Tricuspid Valve  Severe tricuspid regurgitation; RVSP: 68 mmHg. Pulmonic Valve  Pulmonic valve is structurally normal.   Pericardial Effusion  No evidence of any pericardial effusion. Pleural Effusion  No evidence of pleural effusion.   M-Mode/2D Measurements & Calculations   LV Diastolic Dimension:  LV Systolic Dimension:  LA Dimension: 3 cmAO Root  5.24 cm                  3.82 cm                 Dimension: 4.1 cmLA Area:  LV FS:27.1 %             LV Volume Diastolic:    49.0 cm2  LV PW Diastolic: 4.49 cm 819 ml  LV PW Systolic: 3.70 cm  LV Volume Systolic: 68  Septum Diastolic: 1.2 cm ml  Septum Systolic: 0.47 cm LV EDV/LV EDV Index:    RV Diastolic Dimension:                           139 ml/58 m2LV ESV/LV   4.29 cm                           ESV Index: 68 ml/29 m2  LV Area Diastolic: 26.2  EF Calculated (A4C):    LA/Aorta: 0.73  cm2                      51.1 %  LV Area Systolic: 70.6   EF Calculated (2D):     LA volume/Index: 107 ml  cm2                      52.5 %                  /45m2                            LV Length: 8.37 cm                            LVOT: 2.4 cm  Doppler Measurements & Calculations   MV Peak E-Wave: 78   AV Peak Velocity: 108 cm/s    LVOT Peak Velocity: 68.7  cm/s                 AV Peak Gradient: 4.67 mmHg   cm/s  MV Peak A-Wave: 65.2 AV Mean Velocity: 76.1 cm/s   LVOT Mean Velocity: 43.6  cm/s                 AV Mean Gradient: 3 mmHg      cm/s  MV E/A Ratio: 1.2    AV VTI: 23.5 cm               LVOT Peak Gradient: 2  MV Peak Gradient:    AV Area (Continuity):2.92 cm2 mmHgLVOT Mean Gradient: 1  2.43 mmHg mmHg                       LVOT VTI: 15.2 cm             Estimated RVSP: 68 mmHg  MV P1/2t: 73 msec                                  Estimated RAP:3 mmHg  MVA by PHT:3.01 cm2  Estimated PASP: 31.52 mmHg   MV E' Septal                                       TR Velocity:267 cm/s  Velocity: 5.7 cm/s                                 TR Gradient:28.52 mmHg  MV E' Lateral  Velocity: 8.33 cm/s  MV E/E' septal:  13.68  MV E/E' lateral:  9.36      XR FOOT LEFT (MIN 3 VIEWS)    Result Date: 6/7/2021  EXAMINATION: THREE XRAY VIEWS OF THE LEFT FOOT 6/7/2021 5:27 pm COMPARISON: 12/21/2020 HISTORY: ORDERING SYSTEM PROVIDED HISTORY: pain TECHNOLOGIST PROVIDED HISTORY: Reason for exam:->pain Reason for Exam: diabetic ulcers Acuity: Chronic Type of Exam: Initial Additional signs and symptoms: na Relevant Medical/Surgical History: diabetes, ckd FINDINGS: Bones are extremely demineralized. In the interval, patient has undergone amputation of the distal aspect of the great toe. There is an ulceration of the stump. Subjacent to that, there is bony irregularity and likely osteolysis involving the stump of the proximal phalanx. On lateral examination, marked dorsal soft tissue swelling is noted. No soft tissue gas is seen dorsally. Ulceration involving the stump of the left great toe. Bony irregularity of the proximal phalangeal stump, raising the possibility of osteomyelitis. XR FOOT RIGHT (MIN 3 VIEWS)    Result Date: 6/7/2021  EXAMINATION: THREE XRAY VIEWS OF THE RIGHT FOOT 6/7/2021 8:27 pm COMPARISON: 07/16/2012 HISTORY: ORDERING SYSTEM PROVIDED HISTORY: pain TECHNOLOGIST PROVIDED HISTORY: Right Foot Reason for exam:->pain Reason for Exam: diabetic ulcers Acuity: Chronic Type of Exam: Initial Additional signs and symptoms: na Relevant Medical/Surgical History: diabetes, ckd FINDINGS: Status post amputation of the middle and distal phalanx 3rd digit.   Status post amputation of the majority of the 5th digit with subcentimeter remnant proximal 5th phalanx. No acute periostitis or bony destruction. Nonspecific demineralization of the 3rd metatarsal head. Extensive soft tissue swelling. Remaining bones and joint spaces are maintained. 1.  No acute periostitis or bony destruction. IR TUNNELED CVC PLACE WO SQ PORT/PUMP > 5 YEARS    Result Date: 6/14/2021  PROCEDURE: ULTRASOUND GUIDED VASCULAR ACCESS. FLUOROSCOPY GUIDED PLACEMENT OF tunneled central venous catheter. 6/14/2021. HISTORY: ORDERING SYSTEM PROVIDED HISTORY: History of lower extremity infection, need long term IV access for infusion treatment. TECHNOLOGIST PROVIDED HISTORY: Reason for exam:->tunneled picc How many lumens are being requested?->2 What side should this line be placed?->Right What site is the preferred site? ->Internal Jugular SEDATION: None. FLUOROSCOPY DOSE AND TYPE OR TIME AND EXPOSURES: 0.1 minute, 1 fluoro image was obtained. TECHNIQUE: Informed consent was obtained after a detailed explanation of the procedure including risks, benefits, and alternatives. Universal protocol was observed. The right neck and chest were prepped and draped in sterile fashion using maximum sterile barrier technique. Local anesthesia was achieved with lidocaine. A micropuncture needle was used to access the right internal jugular vein using ultrasound guidance. An ultrasound image demonstrating patency of the vein with needle tip located within it. An image was obtained and stored in PACs. A 0.035 guidewire was used to place a peel-away sheath. A chest wall incision was made and a subcutaneous tunnel was created. The tubing was attached and tunneled subcutaneously to the venotomy site. The tubing was cut to an appropriate length and advanced through the peel-away sheath under fluoroscopic guidance to the SVC. The tissue adhesive was applied to the venotomy site. The catheter was flushed easily and there was a good blood return.  The patient tolerated the procedure well and there were no immediate complications. FINDINGS: Fluoroscopic image demonstrates the tip of the catheter at the [facility] and ready to use for infusion treatment. EBL: Less than 1 ml. Successful ultrasound and fluoroscopy guided right IJ tunneled central venous catheter placement and ready to use for infusion. VL DUP LOWER EXTREMITY ARTERIES BILATERAL    Result Date: 6/8/2021  EXAMINATION: ARTERIAL DUPLEX ULTRASOUND OF THE BILATERAL LOWER EXTREMITIES  6/8/2021 6:14 pm TECHNIQUE: Duplex ultrasound and Doppler images were obtained of the bilateral lower extremities COMPARISON: 12/11/2020 HISTORY: ORDERING SYSTEM PROVIDED HISTORY: bilateral foot wounds, need to assess vascular status prior to intervention TECHNOLOGIST PROVIDED HISTORY: Reason for exam:->bilateral foot wounds, need to assess vascular status prior to intervention Reason for Exam: bilateral foot wounds, need to assess vascular status prior to intervention Acuity: Chronic Additional signs and symptoms: PAD, DMII, HTN Relevant Medical/Surgical History: previous US 12/11/20 FINDINGS: Loss of normal triphasic waveforms at the common femoral arteries bilaterally suggesting significant inflow disease. Peripheral arterial disease noted within both lower extremities, with moderate stenosis within the proximal SFA on the right, with decreased poststenotic velocities and reduced waveforms noted. Trifurcation arteries are patent. Of note, the right peroneal artery was not seen on the previous examination which may be technique related. Moderate stenosis within the left deep femoral artery. Occlusion of the distal left posterior tibial artery as well as the left peroneal artery. Moderate disease noted within the trifurcation arteries. Right: Flow velocities were measured as follows: Com. Fem. 148 cm/sec Prof.            173 cm/sec SFA Prox.      219 cm/sec SFA Mid.      60 cm/sec SFA Dist.      20 cm/sec Pop.             71, 59 cm/sec PTA             16, 39, 39 cm/sec Peron. 28,29, 22 cm/sec TE             57, 52, 88 cm/sec Left: Flow velocities were measured as follows: Com. Fem. 136 cm/sec Prof.            278 cm/sec SFA Prox. 87 cm/sec SFA Mid.      53 cm/sec SFA Dist.      77 cm/sec Pop.             55, 48 cm/sec PTA             60, 65, not visualized cm/sec Peron. Occluded TE             19, 16, 38 cm/sec     Aortic or iliac inflow disease, with loss of normal appearing triphasic waveforms at the common femoral arteries. Moderate stenosis within the proximal SFA on the right, with poststenotic reduced velocities noted within the remainder of the left lower extremity, with loss of normal multiphasic waveforms suggesting at least moderate disease. No significant focal severe stenosis seen. Three-vessel runoff to the right foot. Moderate deep femoral artery stenosis on the right, with no severe stenosis seen within the left lower extremity. However the distal PTA and peroneal artery are occluded. Loss of multiphasic waveforms related to at least moderate diffuse disease. VL DUP LOWER EXTREMITY VENOUS BILATERAL    Result Date: 6/7/2021  EXAMINATION: DUPLEX VENOUS ULTRASOUND OF THE BILATERAL LOWER EXTREMITIES, 6/7/2021 9:35 pm TECHNIQUE: Duplex ultrasound using B-mode/gray scaled imaging and Doppler spectral analysis and color flow was obtained of the bilateral lower extremities. COMPARISON: None. HISTORY: ORDERING SYSTEM PROVIDED HISTORY: swelling/pain TECHNOLOGIST PROVIDED HISTORY: Reason for exam:->swelling/pain Reason for Exam: Bilat LE edema, toe wounds Acuity: Unknown Type of Exam: Ongoing Additional signs and symptoms: Prev venous and arterial studies here Relevant Medical/Surgical History: Diabetes FINDINGS: The visualized veins of the bilateral lower extremities above the knee are patent and free of echogenic thrombus.  The veins demonstrate good compressibility with normal color flow study and spectral analysis. Diffuse subcutaneous edema is noted below the knee. Deep veins are not visualized below the knee. No evidence of DVT in either lower extremity above the knee. Deep veins below the knee are not visualized due to diffuse subcutaneous edema. Treatments: As above. Discharge Medications:     Medication List      START taking these medications    amLODIPine 10 MG tablet  Commonly known as: NORVASC  Take 1 tablet by mouth daily     cefTRIAXone 500 MG injection  Commonly known as: ROCEPHIN  Infuse 2,000 mg intravenously daily     chlorthalidone 50 MG tablet  Commonly known as: HYGROTEN  Take 1 tablet by mouth daily     dextrose 5 % SOLN 250 mL with vancomycin 1 g SOLR 1,250 mg  Infuse 1,250 mg intravenously every 72 hours     oxyCODONE 5 MG immediate release tablet  Commonly known as: ROXICODONE  Take 1 tablet by mouth every 4 hours as needed for Pain for up to 3 days. CHANGE how you take these medications    pregabalin 75 MG capsule  Commonly known as: LYRICA  Take 1 capsule by mouth 3 times daily for 30 days. What changed:   · medication strength  · See the new instructions.         CONTINUE taking these medications    albuterol sulfate  (90 Base) MCG/ACT inhaler  Commonly known as: Ventolin HFA  Inhale 2 puffs into the lungs every 4 hours as needed for Wheezing     Algicell Calcium Dressing 4\"x8 Misc  Apply 1 Device topically daily     atorvastatin 40 MG tablet  Commonly known as: LIPITOR  Take 1 tablet by mouth nightly     carvedilol 3.125 MG tablet  Commonly known as: COREG  Take 1 tablet by mouth 2 times daily     clopidogrel 75 MG tablet  Commonly known as: PLAVIX  Take 1 tablet by mouth daily     PROMOGRAN COREY WOUND DRESSING MATRIX  Apply topically Apply to left daily and cover with gauze        STOP taking these medications    doxazosin 2 MG tablet  Commonly known as: CARDURA     doxazosin 4 MG tablet  Commonly known as: CARDURA     insulin lispro 100 UNIT/ML injection vial  Commonly known as: HUMALOG     torsemide 100 MG tablet  Commonly known as: DEMADEX           Where to Get Your Medications      These medications were sent to [address] [place] [address] [phone] - [phone]  [address] [place], [place] [address]mart Nancy    Phone: [phone]   · chlorthalidone 50 MG tablet     You can get these medications from any pharmacy    Bring a paper prescription for each of these medications  · amLODIPine 10 MG tablet  · cefTRIAXone 500 MG injection  · dextrose 5 % SOLN 250 mL with vancomycin 1 g SOLR 1,250 mg  · oxyCODONE 5 MG immediate release tablet  · pregabalin 75 MG capsule     Vancomycin 1250 Q4 days - manual script written by ID. Activity: activity as tolerated, no driving (discussed with patient)  Diet: ADULT DIET; Regular; 4 carb choices (60 gm/meal); Low Potassium (Less than 3000 mg/day); Low Phosphorus (Less than 1000 mg)      Disposition: home  Discharged Condition: Stable  Follow Up:   Mary Burton MD  [address]  [phone]    Schedule an appointment as soon as possible for a visit in 1 week      Nela De La Cruz MD  [place] [address]  [phone]    Schedule an appointment as soon as possible for a visit in 1 week      Juan José Murphy MD  [address]  [phone]    Schedule an appointment as soon as possible for a visit in 1 week      Claude Cantor, [place] [address]  [phone]    Schedule an appointment as soon as possible for a visit in 1 week      Adina Herr MD  [address] [address]  [phone]    Schedule an appointment as soon as possible for a visit in 1 week        Code status:  Full Code     Total time spent on discharge, finalizing medications, referrals and arranging outpatient follow up was more than 30 minutes      Thank you

## 2021-06-15 NOTE — PROGRESS NOTES
Hospital Medicine Progress Note     Date:  6/15/2021    PCP: Ayala Lerner (Tel: [phone])    Date of Admission: 6/7/2021    Chief complaint:   Chief Complaint   Patient presents with    Wound Check     bilateral feet; states infection to amputation sites on bilateral feet, maggots       Brief hospital course: Pleasant 25-year-old male with history of diabetes type 2, CAD status post PCI, PAD, chronic systolic CHF/ischemic cardiomyopathy status post ICD, CKD stage III, essential hypertension, chronic bilateral lower extremity lymphedema, tobacco use disorder, history of recurrent orthostatic hypotension, who presents to the emergency room with complaints of left foot pain, falls. He was noted to have maggots from left foot and was diagnosed with left foot ulcer with osteomyelitis. Assessment/plan:  1. Infected left diabetic foot ulcer with concern for underlying osteomyelitis. Patient has been seen by surgery, planning debridement/amputation, currently on hold due to inability to obtain vascular clearance. Angiogram is also on hold due to NANCY. Wound cultures grew Corynebacterium striatum, MRSA and Proteus mirabilis, was on intravenous vancomycin and meropenem, then transitioned to vancomycin. Infectious disease recommends 6 weeks of IV antibiotics (IV vancomycin and Rocephin) and patient has been recommended to complete course as outpatient. 2. Syncope and collapse, had an event in the hospital on 6/10/2021. Suspect secondary to autonomic insufficiency in the setting of longstanding diabetes. Echocardiogram reveals severe tricuspid regurgitation, RVSP of 68, ejection fraction 45 to 50%. From cardiology standpoint, no further cardiac testing. Patient has been counseled about importance of acclimatization. 3. Acute kidney injury on CKD stage III, not present on admission. Creatinine peaked at 2.3 and has remained stable between 2.2 and 2.3, baseline around 1.4.   Patient was on normal saline which has since been discontinued. Nephrology, okay for discharge with plan to follow-up with nephrology as outpatient. 4. Severe peripheral arterial disease. Status post previous intervention, status post amputation of right third toe, right fifth toe. Patient will follow up with vascular surgery as outpatient. 5. Moderate malnutrition in the context of chronic illness. Continue to encourage p.o. intake, nutritional supplementation when able. 6. Essential hypertension. Continue antihypertensive medications as ordered. Cardura has been discontinued due to syncope. 7. Other comorbidities: History of diabetes with diabetic neuropathy, chronic systolic CHF, hyperlipidemia. Diet: ADULT DIET; Regular; 4 carb choices (60 gm/meal); Low Potassium (Less than 3000 mg/day); Low Phosphorus (Less than 1000 mg)    Code status: Full Code   ----------  Subjective  No new complaints. Denies any needs. Objective  Physical exam:  Vitals: /68   Pulse 61   Temp 98.1 °F (36.7 °C) (Oral)   Resp 19   Ht 6' (1.829 m)   Wt 282 lb 3 oz (128 kg)   SpO2 95%   BMI 38.27 kg/m²   Gen/overall appearance: Not in acute distress. Alert. Oriented x3. Head: Normocephalic, atraumatic  Eyes: EOMI, good acuity  ENT: Oral mucosa moist  Neck: No JVD, thyromegaly  CVS: Nml S1S2, no MRG, RRR  Pulm: Clear bilaterally. No crackles/wheezes  Gastrointestinal: Soft, NT/ND, +BS  Musculoskeletal: Left foot ulcers, right great toe wound. Bilateral lower extremity lymphedema. Neuro: No focal deficit. Moves extremity spontaneously. Psychiatry: Appropriate affect. Not agitated. Skin: Warm, dry with normal turgor.  No rash  Capillary refill: Brisk,< 3 seconds   Peripheral Pulses: +2 palpable, equal bilaterally      24HR INTAKE/OUTPUT:      Intake/Output Summary (Last 24 hours) at 6/15/2021 1207  Last data filed at 6/15/2021 0805  Gross per 24 hour   Intake 10 ml   Output --   Net 10 ml     No intake/output data recorded. I/O this shift:  In: 10 [I.V.:10]  Out: -   Meds:    chlorthalidone  50 mg Oral Daily    vancomycin (VANCOCIN) intermittent dosing (placeholder)   Other See Admin Instructions    insulin lispro  0-12 Units Subcutaneous TID WC    insulin lispro  0-6 Units Subcutaneous Nightly    cefTRIAXone (ROCEPHIN) IV  2,000 mg Intravenous Q24H    amLODIPine  10 mg Oral Daily    pantoprazole  40 mg Oral QAM AC    atorvastatin  40 mg Oral Nightly    carvedilol  3.125 mg Oral BID    [Held by provider] clopidogrel  75 mg Oral Daily    dicyclomine  10 mg Oral 4x Daily AC & HS    pregabalin  150 mg Oral TID    sodium chloride flush  5-40 mL Intravenous BID     Infusions:    sodium chloride Stopped (06/10/21 0109)    dextrose       PRN Meds: oxyCODONE, hydrALAZINE (APRESOLINE) ivpb, albuterol sulfate HFA, sodium chloride flush, sodium chloride, ondansetron **OR** ondansetron, polyethylene glycol, acetaminophen **OR** acetaminophen, glucose, dextrose, glucagon (rDNA), dextrose, morphine **OR** morphine    Labs/imaging:  CBC: No results for input(s): WBC, HGB, PLT in the last 72 hours. BMP:    Recent Labs     06/13/21  0804 06/14/21  0827 06/15/21  0854    140 142   K 5.0 5.2* 5.3*    103 105   CO2 30 30 30   BUN 30* 34* 39*   CREATININE 2.2* 2.3* 2.6*   GLUCOSE 156* 135* 163*     Hepatic: No results for input(s): AST, ALT, ALB, BILITOT, ALKPHOS in the last 72 hours.     Earl Alicea MD  -------------------------------  Rounding hospitalist

## 2021-06-15 NOTE — PROGRESS NOTES
Nephrology Progress Note  6/15/2021 6:31 AM        Subjective:   Admit Date: 6/7/2021  PCP: Geovany Thomas    Interval History: underwent rt Ij ruben PICC for abx     Diet: good    ROS:  No sob       Data:     Current meds:    vancomycin (VANCOCIN) intermittent dosing (placeholder)   Other See Admin Instructions    insulin lispro  0-12 Units Subcutaneous TID WC    insulin lispro  0-6 Units Subcutaneous Nightly    cefTRIAXone (ROCEPHIN) IV  2,000 mg Intravenous Q24H    amLODIPine  10 mg Oral Daily    pantoprazole  40 mg Oral QAM AC    atorvastatin  40 mg Oral Nightly    carvedilol  3.125 mg Oral BID    [Held by provider] clopidogrel  75 mg Oral Daily    dicyclomine  10 mg Oral 4x Daily AC & HS    pregabalin  150 mg Oral TID    sodium chloride flush  5-40 mL Intravenous BID      sodium chloride Stopped (06/10/21 0109)    dextrose           No intake/output data recorded. CBC:   Recent Labs     06/12/21  1024   WBC 6.7   HGB 8.4*             Recent Labs     06/12/21  1024 06/13/21  0804 06/14/21  0827    138 140   K 4.7 5.0 5.2*    100 103   CO2 29 30 30   BUN 27* 30* 34*   CREATININE 2.2* 2.2* 2.3*   GLUCOSE 161* 156* 135*       Lab Results   Component Value Date    CALCIUM 7.3 (L) 06/14/2021    PHOS 4.5 06/12/2021       Objective:     Vitals: /60   Pulse 59   Temp 98.7 °F (37.1 °C) (Oral)   Resp 19   Ht 6' (1.829 m)   Wt 282 lb 3 oz (128 kg)   SpO2 93%   BMI 38.27 kg/m²     General appearance:  No ac distress  HEENT:  + conj pallor  Neck:  Supple- rt IJ Ruben PICC   Lungs:  No gross crackles  Heart:  Seems RRR  Abdomen: soft  Extremities:  ch edema - also wound       Problem List :         Impression :     1. NANCY- CKD stage 3 B A3- stable   2. Mild high K- ad chlorthalidone 50/d   3. ASCVD- skin , soft tissues - bone infection and CMP- seems compensated     Recommendation/Plan  :     1. May d/C   2. Add chlorthalidone 50/d   3.  Low salt DASH diet CMP in 1-2 wks  4. F/U with me in 2-3 wks   5. Manual BP at home   6.  Goal /80 - he is with CCB and BB  Only       Howie Mayo MD MD

## 2021-06-16 ENCOUNTER — HOSPITAL ENCOUNTER (OUTPATIENT)
Age: 71
Setting detail: SPECIMEN
Discharge: HOME | End: 2021-06-16
Payer: MEDICARE

## 2021-06-16 LAB
CREAT SERPL-MCNC: 2.2 MG/DL (ref 0.9–1.3)
DOSE AMOUNT: NORMAL
DOSE TIME: NORMAL
GFR AFRICAN AMERICAN: 36 ML/MIN/1.73M2
GFR NON-AFRICAN AMERICAN: 30 ML/MIN/1.73M2
VANCOMYCIN RANDOM: 9.8 UG/ML

## 2021-06-16 PROCEDURE — 82565 ASSAY OF CREATININE: CPT

## 2021-06-16 PROCEDURE — 80202 ASSAY OF VANCOMYCIN: CPT

## 2021-06-17 ENCOUNTER — APPOINTMENT (OUTPATIENT)
Dept: GENERAL RADIOLOGY | Age: 71
End: 2021-06-17
Payer: MEDICARE

## 2021-06-17 ENCOUNTER — APPOINTMENT (OUTPATIENT)
Dept: CT IMAGING | Age: 71
End: 2021-06-17
Payer: MEDICARE

## 2021-06-17 ENCOUNTER — HOSPITAL ENCOUNTER (EMERGENCY)
Age: 71
Discharge: HOME | End: 2021-06-17
Payer: MEDICARE

## 2021-06-17 VITALS
TEMPERATURE: 98.1 F | HEART RATE: 65 BPM | HEIGHT: 72 IN | DIASTOLIC BLOOD PRESSURE: 61 MMHG | WEIGHT: 260 LBS | OXYGEN SATURATION: 94 % | RESPIRATION RATE: 18 BRPM | SYSTOLIC BLOOD PRESSURE: 154 MMHG | BODY MASS INDEX: 35.21 KG/M2
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
## 2021-06-17 DIAGNOSIS — M79.671 RIGHT FOOT PAIN: ICD-10-CM

## 2021-06-17 DIAGNOSIS — L97.509 DIABETIC FOOT ULCER ASSOCIATED WITH DIABETES MELLITUS DUE TO UNDERLYING CONDITION, UNSPECIFIED LATERALITY, UNSPECIFIED PART OF FOOT, UNSPECIFIED ULCER STAGE (HCC): ICD-10-CM

## 2021-06-17 DIAGNOSIS — E08.621 DIABETIC FOOT ULCER ASSOCIATED WITH DIABETES MELLITUS DUE TO UNDERLYING CONDITION, UNSPECIFIED LATERALITY, UNSPECIFIED PART OF FOOT, UNSPECIFIED ULCER STAGE (HCC): ICD-10-CM

## 2021-06-17 PROCEDURE — 73630 X-RAY EXAM OF FOOT: CPT

## 2021-06-17 PROCEDURE — 6370000000 HC RX 637 (ALT 250 FOR IP): Performed by: PHYSICIAN ASSISTANT

## 2021-06-17 PROCEDURE — 70450 CT HEAD/BRAIN W/O DYE: CPT

## 2021-06-17 PROCEDURE — 71045 X-RAY EXAM CHEST 1 VIEW: CPT

## 2021-06-17 PROCEDURE — 93005 ELECTROCARDIOGRAM TRACING: CPT | Performed by: PHYSICIAN ASSISTANT

## 2021-06-17 PROCEDURE — 72170 X-RAY EXAM OF PELVIS: CPT

## 2021-06-17 PROCEDURE — 99284 EMERGENCY DEPT VISIT MOD MDM: CPT

## 2021-06-17 PROCEDURE — 72125 CT NECK SPINE W/O DYE: CPT

## 2021-06-17 RX ORDER — OXYCODONE HYDROCHLORIDE 5 MG/1
5 TABLET ORAL EVERY 4 HOURS PRN
Status: DISCONTINUED | OUTPATIENT
Start: 2021-06-17 | End: 2021-06-17 | Stop reason: HOSPADM

## 2021-06-17 RX ADMIN — OXYCODONE HYDROCHLORIDE 5 MG: 5 TABLET ORAL at 13:13

## 2021-06-17 ASSESSMENT — PAIN DESCRIPTION - ONSET: ONSET: SUDDEN

## 2021-06-17 ASSESSMENT — PAIN SCALES - GENERAL
PAINLEVEL_OUTOF10: 10
PAINLEVEL_OUTOF10: 10

## 2021-06-17 ASSESSMENT — PAIN DESCRIPTION - DESCRIPTORS: DESCRIPTORS: NUMBNESS;TENDER

## 2021-06-17 ASSESSMENT — PAIN DESCRIPTION - PAIN TYPE: TYPE: ACUTE PAIN

## 2021-06-17 ASSESSMENT — PAIN DESCRIPTION - PROGRESSION: CLINICAL_PROGRESSION: GRADUALLY WORSENING

## 2021-06-17 ASSESSMENT — PAIN DESCRIPTION - LOCATION: LOCATION: FOOT

## 2021-06-17 ASSESSMENT — PAIN DESCRIPTION - ORIENTATION: ORIENTATION: RIGHT

## 2021-06-17 ASSESSMENT — PAIN DESCRIPTION - FREQUENCY: FREQUENCY: CONTINUOUS

## 2021-06-17 NOTE — ED PROVIDER NOTES
EMERGENCY DEPARTMENT ENCOUNTER      PCP: [id number]    Chief Complaint   Patient presents with    Wound Check     right     This patient was not evaluated by the attending physician. I have independently evaluated this patient. HPI    Justo Magdaleno is a 79 y.o. male who presents to the emergency department today via EMS after sustaining mechanical fall. Patient states that he was at home and someone came to his door he caused him to get up and he said today lost his balance causing him to fall forward. He was recently just discharged from the hospital, has infected diabetic foot ulcers, is receiving IV antibiotics is following up with wound care tomorrow. Is awaiting his kidney function to improve to receive an angiogram and the possibility of a debridement/surgery. He states that he is essentially at his baseline but fell today. He denies any preceding symptoms, lightheadedness, palpitations, syncope. He denies hitting his head, no loss of consciousness, no neck pain. His chief complaint is right sided foot pain, he is not actively bleeding, he locates in the general area of his toe, they are wrapped up during my encounter. Patient is on Plavix. REVIEW OF SYSTEMS    General: No Fever  ENT:  No visual changes. No headache. Cardiac: No Chest Pain, No syncope  Respiratory: No cough or difficulty breathing  GI: No vomiting. No Bloody Stool or Diarrhea  : No Dysuria or Hematuria  MSKTL:  See HPI. No neck or back pain.   Skin:  Denies rash  Neurologic:  Denies headache, focal weakness or sensory changes   Endocrine:  Denies polyuria or polydypsia   Lymphatic:  Denies swollen glands   See HPI and nursing notes for additional information       PAST MEDICAL & SURGICAL HISTORY    Past Medical History:   Diagnosis Date    Acid reflux     Acute MI ([facility] [address] 2004, 2008    Arthritis     Back    Broken teeth     Upper Front    CAD (coronary artery disease)     Sees  Allison Wyatt    Cardiomyopathy [facility])     per old chart    CHF (congestive heart failure) (HCC)     Chronic back pain     Chronic kidney disease     STAGE 3 KIDNEY FAILURE- \"from my diabetes- do not follow with any one- have seen Dr Alexandra Trevizo in the past\"    Diabetes mellitus ([facility] [address] Dx 1965    per old chart pt has been diabetic since age 13    Diabetic neuropathy ([facility] [address]     \"in my feet\"    H/O cardiovascular stress test 08/25/2016    H/O Doppler ultrasound 09/27/2018    Moderate disease of the right lower extremity with an JALEN of 0.72.   Moderate to severe disease of the left lower extremity with an JALEN of 0.55.   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)      Past Surgical History:   Procedure Laterality Date    CARDIAC CATHETERIZATION      per old chart done 10/2014    CARDIAC CATHETERIZATION  07/14/2017    with angiography of leg    CARDIAC CATHETERIZATION  11/20/2018    PATENT STENTS OF ALL THREE MAJOR VESSELS    CARDIAC DEFIBRILLATOR PLACEMENT  06/04/2010    [employer] [address] Defibrillator Implanted    COLECTOMY Right 08/26/2016    laparascopic; robotic assisted    COLONOSCOPY  08/04/2016    CORONARY ANGIOPLASTY      \"15 Heart Stents\"    CORONARY ANGIOPLASTY WITH STENT PLACEMENT      per old chart had angio with stent to circumflex and obtuse marginal artery at [facility] 5/2010( old chart also gives hx of stent placement done 2000,2004 and 2005)    DENTAL SURGERY      Teeth Extracted In Past    IR TUNNELED CATHETER PLACEMENT GREATER THAN 5 YEARS  6/14/2021    IR TUNNELED CATHETER PLACEMENT GREATER THAN 5 YEARS 6/14/2021 [facility] SPECIAL PROCEDURES    PACEMAKER PLACEMENT  06/04/2010    [employer] [address] Defibrillator Implanted    TOE AMPUTATION Right 09/12/2017    Rt 3rd toe    TOE AMPUTATION Right 01/09/2018     Right 5th toe amputation and Toenails trimmed left 2,3,4 and 5th toes    TOE AMPUTATION Left 12/26/2020    LEFT GREAT TOE AMPUTATION performed by Ce Louise MD at [address]      per old chart had balloon angioplasty right superfical femoral artery,right popliteal artery,,right ant.tibial artery, right tibioperoneal trunk, and right post.tibial artery wna stent placement right popliteal artery and superfical femoral artery 7/2012       CURRENT MEDICATIONS    Current Outpatient Rx   Medication Sig Dispense Refill    chlorthalidone (HYGROTEN) 50 MG tablet Take 1 tablet by mouth daily 30 tablet 0    cefTRIAXone (ROCEPHIN) 500 MG injection Infuse 2,000 mg intravenously daily 70564 mg 0    amLODIPine (NORVASC) 10 MG tablet Take 1 tablet by mouth daily 30 tablet 0    oxyCODONE (ROXICODONE) 5 MG immediate release tablet Take 1 tablet by mouth every 4 hours as needed for Pain for up to 3 days. 12 tablet 0    pregabalin (LYRICA) 75 MG capsule Take 1 capsule by mouth 3 times daily for 30 days.  90 capsule 0    atorvastatin (LIPITOR) 40 MG tablet Take 1 tablet by mouth nightly 30 tablet 3    carvedilol (COREG) 3.125 MG tablet Take 1 tablet by mouth 2 times daily 60 tablet 3    albuterol sulfate HFA (VENTOLIN HFA) 108 (90 Base) MCG/ACT inhaler Inhale 2 puffs into the lungs every 4 hours as needed for Wheezing 1 Inhaler 3    Calcium Alginate (ALGICELL CALCIUM DRESSING 4\"X8) MISC Apply 1 Device topically daily 30 each 3    PROMOGRAN COREY WOUND DRESSING MATRIX Apply topically Apply to left daily and cover with gauze 1 Package 3    clopidogrel (PLAVIX) 75 MG tablet Take 1 tablet by mouth daily 30 tablet 11       ALLERGIES    Allergies   Allergen Reactions    Pcn [Penicillins] Hives    Fentanyl Itching       SOCIAL & FAMILY HISTORY    Social History     Socioeconomic History    Marital status:      Spouse name: None    Number of children: None    Years of education: None    Highest education level: None   Occupational History    None   Tobacco Use    Smoking status: Current Some Day Smoker     Packs/day: 0.10     Years: 36.00     Pack years: 3.60     Types: Cigars     Start date: 1/1/1980    Smokeless tobacco: Never Used   Vaping Use    Vaping Use: Never used   Substance and Sexual Activity    Alcohol use: No    Drug use: Yes     Types: Marijuana    Sexual activity: Never   Other Topics Concern    None   Social History Narrative    None     Social Determinants of Health     Financial Resource Strain:     Difficulty of Paying Living Expenses:    Food Insecurity:     Worried About Running Out of Food in the Last Year:     Ran Out of Food in the Last Year:    Transportation Needs:     Lack of Transportation (Medical):      Lack of Transportation (Non-Medical):    Physical Activity:     Days of Exercise per Week:     Minutes of Exercise per Session:    Stress:     Feeling of Stress :    Social Connections:     Frequency of Communication with Friends and Family:     Frequency of Social Gatherings with Friends and Family:     Attends [employer] Services:     Active Member of Clubs or Organizations:     Attends Club or Organization Meetings:     Marital Status:    Intimate Partner Violence:     Fear of Current or Ex-Partner:     Emotionally Abused:     Physically Abused:     Sexually Abused:      Family History   Problem Relation Age of Onset    Diabetes Mother     Stroke Mother     High Blood Pressure Mother     Vision Loss Mother     Cancer Father         Prostate Cancer    Diabetes Sister     Neuropathy Sister     Other Sister         \"Breathing Problems\"    Heart Disease Sister     Early Death Sister 62        Heart Complications    Cancer Brother         \"Stomach Cancer\"    High Blood Pressure Brother     Diabetes Brother     Heart Disease Brother     High Blood Pressure Brother     Cancer Son         \"Testicle Cancer\"       PHYSICAL EXAM    VITAL SIGNS: BP (!) 195/77   Pulse 65   Temp 97.8 °F (36.6 °C) (Oral)   Resp 18   Ht 6' (1.829 m)   Wt 260 lb (117.9 kg)   SpO2 95%   BMI 35.26 kg/m²    Constitutional:  Well developed, well nourished, no acute distress   Eyes: EOMI. PERRL, sclera nonicteric. Anterior chambers clear. Funduscopic exam without any gross abnormality or hemorrhages. HENT:  Atraumatic, no trismus. Ears canals and TMs free of blood or clear fluid. Nasal passages and oropharynx free of blood or clear fluid. No circumferential periorbital ecchymosis or mastoid ecchymosis noted. Neck/Lymphatics: supple, no JVD, no swollen nodes. No posterior neck tenderness. Range of motion without obvious pain or deficit. Respiratory:  Lungs Clear, no retractions   Cardiovascular:   normal rate, no murmurs  GI:  Soft, nontender, normal bowel sounds  Musculoskeletal:  No edema, no obvious defects or deformities to the bilateral upper and and lower extremities. Bilateral lower extremities are wrapped in gauze, no obvious signs of bleeding discharge. BACK: There is not thoracic or lumbar midline tenderness to palpation or step-offs. Paraspinal tenderness to palpation is not present in the paralumbar region. No overlying rashes. LE strength is 5/5. LE light touch is intact. LE DTR's are 2+ in the patellas and achilles.  Straight leg test is negative on the RIGHT, negative on the LEFT.  Integument: His feet were wrapped, patient did request does not remove his wrapping since he was going to wound care tomorrow. Neurologic:    - Alert & oriented person, place, time, and situation, no speech difficulties or slurring.  - No obvious gross motor deficits  - Cranial nerves 2-12 grossly intact  - Negative meningeal signs.  - Sensation intact to light touch  - Strength 5/5 in upper and lower extremities bilaterally  - Light touch sensation intact throughout. - Upper and lower extremity DTRs 2+ bilaterally. Psych: Pleasant affect, no hallucinations      EKG    See supervising physician for EKG interpretation. RADIOLOGY   ECHO Complete 2D W Doppler W Color    Result Date: 6/11/2021  Transthoracic Echocardiography Report (TTE)  Demographics   Patient Name       Tu Jackson       Date of Study       06/11/2021   Date of Birth      1950         Gender              Male   Age                79 year(s)         Race                Black   Patient Number     [id number]         Room Number         1119   Visit Number       [id number]   Corporate ID       [id number]   Accession Number   [id number]         Bonnie Ramirez RDMS   Ordering Physician Marcus Collado MD           Physician           Veronica RAMOS  Procedure Type of Study   TTE procedure:ECHOCARDIOGRAM COMPLETE 2D W DOPPLER W COLOR. Procedure Date Date: 06/11/2021 Start: 09:54 AM Study Location: Portable Technical Quality: Good visualization Indications:Syncope. Patient Status: Routine Height: 72 inches Weight: 260 pounds BSA: 2.38 m2 BMI: 35.26 kg/m2 BP: 115/57 mmHg  Conclusions   Summary  Left ventricular systolic function is low normal.  Ejection fraction is visually estimated at 45-50%. Severely dilated left atrium. Severely dilated right ventricle.   PPM wiring visualized within the right heart. Severe tricuspid regurgitation; RVSP: 68 mmHg. No evidence of any pericardial effusion. Signature   ------------------------------------------------------------------  Electronically signed by Juanpablo Pepper MD  (Interpreting physician) on 06/11/2021 at 11:18 AM  ------------------------------------------------------------------   Findings   Left Ventricle  Left ventricular systolic function is low normal.  Ejection fraction is visually estimated at 45-50%. Mild left ventricular hypertrophy to the posterior wall. Left Atrium  Severely dilated left atrium. Right Atrium  PPM wiring visualized in right atrium. Right Ventricle  Severely dilated right ventricle. PPM wiring visualized within the right ventricle. Aortic Valve  Trace aortic regurgitation. Mitral Valve  Structurally normal mitral valve. Tricuspid Valve  Severe tricuspid regurgitation; RVSP: 68 mmHg. Pulmonic Valve  Pulmonic valve is structurally normal.   Pericardial Effusion  No evidence of any pericardial effusion. Pleural Effusion  No evidence of pleural effusion.   M-Mode/2D Measurements & Calculations   LV Diastolic Dimension:  LV Systolic Dimension:  LA Dimension: 3 cmAO Root  5.24 cm                  3.82 cm                 Dimension: 4.1 cmLA Area:  LV FS:27.1 %             LV Volume Diastolic:    61.5 cm2  LV PW Diastolic: 2.30 cm 695 ml  LV PW Systolic: 0.94 cm  LV Volume Systolic: 68  Septum Diastolic: 1.2 cm ml  Septum Systolic: 6.40 cm LV EDV/LV EDV Index:    RV Diastolic Dimension:                           139 ml/58 m2LV ESV/LV   4.29 cm                           ESV Index: 68 ml/29 m2  LV Area Diastolic: 56.5  EF Calculated (A4C):    LA/Aorta: 0.73  cm2                      51.1 %  LV Area Systolic: 67.5   EF Calculated (2D):     LA volume/Index: 107 ml  cm2                      52.5 %                  /45m2                            LV Length: 8.37 cm                            LVOT: 2.4 cm Doppler Measurements & Calculations   MV Peak E-Wave: 78   AV Peak Velocity: 108 cm/s    LVOT Peak Velocity: 68.7  cm/s                 AV Peak Gradient: 4.67 mmHg   cm/s  MV Peak A-Wave: 65.2 AV Mean Velocity: 76.1 cm/s   LVOT Mean Velocity: 43.6  cm/s                 AV Mean Gradient: 3 mmHg      cm/s  MV E/A Ratio: 1.2    AV VTI: 23.5 cm               LVOT Peak Gradient: 2  MV Peak Gradient:    AV Area (Continuity):2.92 cm2 mmHgLVOT Mean Gradient: 1  2.43 mmHg                                          mmHg                       LVOT VTI: 15.2 cm             Estimated RVSP: 68 mmHg  MV P1/2t: 73 msec                                  Estimated RAP:3 mmHg  MVA by PHT:3.01 cm2  Estimated PASP: 31.52 mmHg   MV E' Septal                                       TR Velocity:267 cm/s  Velocity: 5.7 cm/s                                 TR Gradient:28.52 mmHg  MV E' Lateral  Velocity: 8.33 cm/s  MV E/E' septal:  13.68  MV E/E' lateral:  9.36      XR PELVIS (1-2 VIEWS)    Result Date: 6/17/2021  EXAMINATION: THREE XRAY VIEWS OF THE RIGHT FOOT; ONE XRAY VIEW OF THE PELVIS; ONE XRAY VIEW OF THE CHEST; THREE XRAY VIEWS OF THE LEFT FOOT 6/17/2021 12:24 pm COMPARISON: Foot radiographs dated 06/07/2021 and chest radiograph dated 12/21/2020 as well as pelvic radiograph dated 10/28/2020 HISTORY: ORDERING SYSTEM PROVIDED HISTORY: history of diabetic ulcers, sustained new fall today, bilateral foot pain TECHNOLOGIST PROVIDED HISTORY: Reason for exam:->history of diabetic ulcers, sustained new fall today, bilateral foot pain Reason for Exam: history of diabetic ulcers, sustained new fall today, bilateral foot pain FINDINGS: Right foot: The patient has undergone partial amputation of the right 3rd toe and near complete amputation of the right 4th toe. The bones are osteopenic. There is a soft tissue wound about the right great toe similar to 2 weeks earlier. No underlying osteolysis is seen. No pathologic fracture is noted.   Diffuse soft tissue swelling is present. Left foot: Patient has undergone previous partial amputation of the left great toe. There is a soft tissue wound of the stump of the left great toe. No underlying osteolysis or periosteal new bone formation is seen. Bones are osteopenic. Diffuse soft tissue swelling is present. Chest: AICD device and right IJ central venous catheter are in place. The heart is enlarged. The lungs are clear. No acute osseous abnormality is seen. Pelvis: The hip and SI joints are in anatomic alignment. No acute fracture is seen. Atherosclerotic vascular calcifications are noted. Vascular stent in the proximal right thigh is partially visualized. Soft tissue swelling of both feet with sequela of partial amputation of several toes of both feet with residual soft tissue wounds about both great toes. No underlying osteolysis or periosteal new bone formation/osteomyelitis is seen. Cardiomegaly no acute pulmonary disease. No acute osseous injury of the pelvis. XR FOOT LEFT (MIN 3 VIEWS)    Result Date: 6/17/2021  EXAMINATION: THREE XRAY VIEWS OF THE RIGHT FOOT; ONE XRAY VIEW OF THE PELVIS; ONE XRAY VIEW OF THE CHEST; THREE XRAY VIEWS OF THE LEFT FOOT 6/17/2021 12:24 pm COMPARISON: Foot radiographs dated 06/07/2021 and chest radiograph dated 12/21/2020 as well as pelvic radiograph dated 10/28/2020 HISTORY: ORDERING SYSTEM PROVIDED HISTORY: history of diabetic ulcers, sustained new fall today, bilateral foot pain TECHNOLOGIST PROVIDED HISTORY: Reason for exam:->history of diabetic ulcers, sustained new fall today, bilateral foot pain Reason for Exam: history of diabetic ulcers, sustained new fall today, bilateral foot pain FINDINGS: Right foot: The patient has undergone partial amputation of the right 3rd toe and near complete amputation of the right 4th toe. The bones are osteopenic. There is a soft tissue wound about the right great toe similar to 2 weeks earlier.  No underlying osteolysis is seen. No pathologic fracture is noted. Diffuse soft tissue swelling is present. Left foot: Patient has undergone previous partial amputation of the left great toe. There is a soft tissue wound of the stump of the left great toe. No underlying osteolysis or periosteal new bone formation is seen. Bones are osteopenic. Diffuse soft tissue swelling is present. Chest: AICD device and right IJ central venous catheter are in place. The heart is enlarged. The lungs are clear. No acute osseous abnormality is seen. Pelvis: The hip and SI joints are in anatomic alignment. No acute fracture is seen. Atherosclerotic vascular calcifications are noted. Vascular stent in the proximal right thigh is partially visualized. Soft tissue swelling of both feet with sequela of partial amputation of several toes of both feet with residual soft tissue wounds about both great toes. No underlying osteolysis or periosteal new bone formation/osteomyelitis is seen. Cardiomegaly no acute pulmonary disease. No acute osseous injury of the pelvis. XR FOOT LEFT (MIN 3 VIEWS)    Result Date: 6/7/2021  EXAMINATION: THREE XRAY VIEWS OF THE LEFT FOOT 6/7/2021 5:27 pm COMPARISON: 12/21/2020 HISTORY: ORDERING SYSTEM PROVIDED HISTORY: pain TECHNOLOGIST PROVIDED HISTORY: Reason for exam:->pain Reason for Exam: diabetic ulcers Acuity: Chronic Type of Exam: Initial Additional signs and symptoms: na Relevant Medical/Surgical History: diabetes, ckd FINDINGS: Bones are extremely demineralized. In the interval, patient has undergone amputation of the distal aspect of the great toe. There is an ulceration of the stump. Subjacent to that, there is bony irregularity and likely osteolysis involving the stump of the proximal phalanx. On lateral examination, marked dorsal soft tissue swelling is noted. No soft tissue gas is seen dorsally. Ulceration involving the stump of the left great toe.   Bony irregularity of the proximal phalangeal formation/osteomyelitis is seen. Cardiomegaly no acute pulmonary disease. No acute osseous injury of the pelvis. XR FOOT RIGHT (MIN 3 VIEWS)    Result Date: 6/7/2021  EXAMINATION: THREE XRAY VIEWS OF THE RIGHT FOOT 6/7/2021 8:27 pm COMPARISON: 07/16/2012 HISTORY: ORDERING SYSTEM PROVIDED HISTORY: pain TECHNOLOGIST PROVIDED HISTORY: Right Foot Reason for exam:->pain Reason for Exam: diabetic ulcers Acuity: Chronic Type of Exam: Initial Additional signs and symptoms: na Relevant Medical/Surgical History: diabetes, ckd FINDINGS: Status post amputation of the middle and distal phalanx 3rd digit. Status post amputation of the majority of the 5th digit with subcentimeter remnant proximal 5th phalanx. No acute periostitis or bony destruction. Nonspecific demineralization of the 3rd metatarsal head. Extensive soft tissue swelling. Remaining bones and joint spaces are maintained. 1.  No acute periostitis or bony destruction. CT HEAD WO CONTRAST    Result Date: 6/17/2021  EXAMINATION: CT OF THE HEAD WITHOUT CONTRAST; CT OF THE CERVICAL SPINE WITHOUT CONTRAST 6/17/2021 11:19 am TECHNIQUE: CT of the head was performed without the administration of intravenous contrast. Dose modulation, iterative reconstruction, and/or weight based adjustment of the mA/kV was utilized to reduce the radiation dose to as low as reasonably achievable.; CT of the cervical spine was performed without the administration of intravenous contrast. Multiplanar reformatted images are provided for review. Dose modulation, iterative reconstruction, and/or weight based adjustment of the mA/kV was utilized to reduce the radiation dose to as low as reasonably achievable. COMPARISON: 10/28/2020. HISTORY: ORDERING SYSTEM PROVIDED HISTORY: fall with head and neck pain TECHNOLOGIST PROVIDED HISTORY: Reason for exam:->fall Has a \"code stroke\" or \"stroke alert\" been called? ->No Decision Support Exception - unselect if not a suspected or confirmed adjustment of the mA/kV was utilized to reduce the radiation dose to as low as reasonably achievable. COMPARISON: 10/28/2020. HISTORY: ORDERING SYSTEM PROVIDED HISTORY: fall with head and neck pain TECHNOLOGIST PROVIDED HISTORY: Reason for exam:->fall Has a \"code stroke\" or \"stroke alert\" been called? ->No Decision Support Exception - unselect if not a suspected or confirmed emergency medical condition->Emergency Medical Condition (MA) Reason for Exam: trauma/ fall, headache Acuity: Acute Type of Exam: Initial FINDINGS: CT SCAN HEAD: BRAIN/VENTRICLES: There is no acute intracranial hemorrhage, mass effect or midline shift. No abnormal extra-axial fluid collection. The gray-white differentiation is maintained without evidence of an acute infarct. There is no evidence of hydrocephalus. The cerebral sulci and ventricles are enlarged. There is low-attenuation within the periventricular white matter and deep white matter of the brain. ORBITS: The visualized portion of the orbits demonstrate no acute abnormality. SINUSES: The visualized paranasal sinuses and mastoid air cells demonstrate no acute abnormality. SOFT TISSUES/SKULL:  No acute abnormality involving the visualized skull or soft tissues. CT SCAN CERVICAL SPINE: BONES/ALIGNMENT: The alignment of the cervical spine is within normal limits. No acute fractures or dislocations are seen. DEGENERATIVE CHANGES: There is multilevel degenerative disc disease of the cervical spine. SOFT TISSUES: There is no prevertebral soft tissue swelling. 1. No acute intracranial abnormality. 2. Diffuse cerebral atrophy with chronic small vessel ischemic disease. 3. No acute traumatic abnormality involving the cervical spine.      XR CHEST PORTABLE    Result Date: 6/17/2021  EXAMINATION: THREE XRAY VIEWS OF THE RIGHT FOOT; ONE XRAY VIEW OF THE PELVIS; ONE XRAY VIEW OF THE CHEST; THREE XRAY VIEWS OF THE LEFT FOOT 6/17/2021 12:24 pm COMPARISON: Foot radiographs dated 06/07/2021 and chest radiograph dated 12/21/2020 as well as pelvic radiograph dated 10/28/2020 HISTORY: ORDERING SYSTEM PROVIDED HISTORY: history of diabetic ulcers, sustained new fall today, bilateral foot pain TECHNOLOGIST PROVIDED HISTORY: Reason for exam:->history of diabetic ulcers, sustained new fall today, bilateral foot pain Reason for Exam: history of diabetic ulcers, sustained new fall today, bilateral foot pain FINDINGS: Right foot: The patient has undergone partial amputation of the right 3rd toe and near complete amputation of the right 4th toe. The bones are osteopenic. There is a soft tissue wound about the right great toe similar to 2 weeks earlier. No underlying osteolysis is seen. No pathologic fracture is noted. Diffuse soft tissue swelling is present. Left foot: Patient has undergone previous partial amputation of the left great toe. There is a soft tissue wound of the stump of the left great toe. No underlying osteolysis or periosteal new bone formation is seen. Bones are osteopenic. Diffuse soft tissue swelling is present. Chest: AICD device and right IJ central venous catheter are in place. The heart is enlarged. The lungs are clear. No acute osseous abnormality is seen. Pelvis: The hip and SI joints are in anatomic alignment. No acute fracture is seen. Atherosclerotic vascular calcifications are noted. Vascular stent in the proximal right thigh is partially visualized. Soft tissue swelling of both feet with sequela of partial amputation of several toes of both feet with residual soft tissue wounds about both great toes. No underlying osteolysis or periosteal new bone formation/osteomyelitis is seen. Cardiomegaly no acute pulmonary disease. No acute osseous injury of the pelvis. IR TUNNELED CVC PLACE WO SQ PORT/PUMP > 5 YEARS    Result Date: 6/14/2021  PROCEDURE: ULTRASOUND GUIDED VASCULAR ACCESS. FLUOROSCOPY GUIDED PLACEMENT OF tunneled central venous catheter. 6/14/2021.  HISTORY: TECHNIQUE: Duplex ultrasound and Doppler images were obtained of the bilateral lower extremities COMPARISON: 12/11/2020 HISTORY: ORDERING SYSTEM PROVIDED HISTORY: bilateral foot wounds, need to assess vascular status prior to intervention TECHNOLOGIST PROVIDED HISTORY: Reason for exam:->bilateral foot wounds, need to assess vascular status prior to intervention Reason for Exam: bilateral foot wounds, need to assess vascular status prior to intervention Acuity: Chronic Additional signs and symptoms: PAD, DMII, HTN Relevant Medical/Surgical History: previous US 12/11/20 FINDINGS: Loss of normal triphasic waveforms at the common femoral arteries bilaterally suggesting significant inflow disease. Peripheral arterial disease noted within both lower extremities, with moderate stenosis within the proximal SFA on the right, with decreased poststenotic velocities and reduced waveforms noted. Trifurcation arteries are patent. Of note, the right peroneal artery was not seen on the previous examination which may be technique related. Moderate stenosis within the left deep femoral artery. Occlusion of the distal left posterior tibial artery as well as the left peroneal artery. Moderate disease noted within the trifurcation arteries. Right: Flow velocities were measured as follows: Com. Fem. 148 cm/sec Prof.            173 cm/sec SFA Prox. 219 cm/sec SFA Mid.      60 cm/sec SFA Dist.      20 cm/sec Pop.             71, 59 cm/sec PTA             16, 39, 39 cm/sec Peron. 28,29, 22 cm/sec TE             57, 52, 88 cm/sec Left: Flow velocities were measured as follows: Com. Fem. 136 cm/sec Prof.            278 cm/sec SFA Prox. 87 cm/sec SFA Mid.      53 cm/sec SFA Dist.      77 cm/sec Pop.             55, 48 cm/sec PTA             60, 65, not visualized cm/sec Peron.           Occluded TE             19, 16, 38 cm/sec     Aortic or iliac inflow disease, with loss of normal appearing triphasic waveforms at admitted to the hospital and released for outpatient treatment yesterday, states things have been going well at home, he has a PICC line receiving IV antibiotics has follow-up with wound care tomorrow. Is awaiting an outpatient angiogram and possible debridement depending on his kidney function. He states that he is presenting today because he fell, he states that the son was at his door and he tried to stand up losing his balance causing him to fall forward. He injured his right foot, he states that it is painful but not significantly different than his usual pain. He denies any has had no neck pain no upper or lower back pain. No upper extremity pain. No chest wall tenderness, abdominal tenderness. He overall appears well. His daughter is at bedside answering questions stating that he does have a lot of his outpatient appointment set up and moving forward. Both of his feet are wrapped up during my encounter. Secondary to the nature of his fall and his history of comorbidities and being anticoagulated a broad work-up was initiated. We obtained CT imaging of his head, cervical spine, will obtain bilateral foot x-rays, chest x-ray, pelvis x-ray and screening EKG. He will be given his regular scheduled dose of pain medication    Patient's imaging essentially negative. No traumatic findings of the CT of the head, cervical spine, chest x-ray was also acutely negative but did show cardiomegaly without any active pulmonary disease. No acute osseous injury of his pelvis. Did show soft tissue swelling of both feet with a sequela of his partial amputation and soft tissue wounds no developing osteolysis or periosteal bone formation or osteomyelitis seen. EKG around his baseline. Patient motivated to go home. He states that he has follow-up with wound care tomorrow that he is going to go.   He is also to follow-up with a nephrologist f to evaluate his kidney function so he then can get the angiogram, will

## 2021-06-17 NOTE — ED NOTES
Patient and family refused wound care stating his home care nurse stated to leave on and she wound change dressing tomorrow. Suleiman Bang.  IFEOMA Renteria  06/17/21 [id number]

## 2021-06-18 ENCOUNTER — HOSPITAL ENCOUNTER (OUTPATIENT)
Dept: WOUND CARE | Age: 71
Discharge: HOME | End: 2021-06-18
Payer: MEDICARE

## 2021-06-18 VITALS
TEMPERATURE: 97.5 F | RESPIRATION RATE: 18 BRPM | DIASTOLIC BLOOD PRESSURE: 69 MMHG | HEART RATE: 58 BPM | SYSTOLIC BLOOD PRESSURE: 139 MMHG

## 2021-06-18 DIAGNOSIS — L97.512 DIABETIC ULCER OF TOE OF RIGHT FOOT ASSOCIATED WITH TYPE 2 DIABETES MELLITUS, WITH FAT LAYER EXPOSED (HCC): ICD-10-CM

## 2021-06-18 DIAGNOSIS — I73.9 PVD (PERIPHERAL VASCULAR DISEASE) (HCC): Primary | ICD-10-CM

## 2021-06-18 DIAGNOSIS — R52 PERSISTENT WOUND PAIN: ICD-10-CM

## 2021-06-18 DIAGNOSIS — E11.621 DIABETIC ULCER OF TOE OF RIGHT FOOT ASSOCIATED WITH TYPE 2 DIABETES MELLITUS, WITH FAT LAYER EXPOSED (HCC): ICD-10-CM

## 2021-06-18 DIAGNOSIS — E11.621 DIABETIC ULCER OF TOE OF LEFT FOOT ASSOCIATED WITH TYPE 2 DIABETES MELLITUS, WITH FAT LAYER EXPOSED (HCC): ICD-10-CM

## 2021-06-18 DIAGNOSIS — L97.522 DIABETIC ULCER OF TOE OF LEFT FOOT ASSOCIATED WITH TYPE 2 DIABETES MELLITUS, WITH FAT LAYER EXPOSED (HCC): ICD-10-CM

## 2021-06-18 LAB
EKG ATRIAL RATE: 93 BPM
EKG DIAGNOSIS: NORMAL
EKG Q-T INTERVAL: 458 MS
EKG QRS DURATION: 100 MS
EKG QTC CALCULATION (BAZETT): 472 MS
EKG R AXIS: 36 DEGREES
EKG T AXIS: 75 DEGREES
EKG VENTRICULAR RATE: 64 BPM

## 2021-06-18 PROCEDURE — 11042 DBRDMT SUBQ TIS 1ST 20SQCM/<: CPT | Performed by: NURSE PRACTITIONER

## 2021-06-18 PROCEDURE — 93010 ELECTROCARDIOGRAM REPORT: CPT | Performed by: INTERNAL MEDICINE

## 2021-06-18 PROCEDURE — 99213 OFFICE O/P EST LOW 20 MIN: CPT

## 2021-06-18 PROCEDURE — 99213 OFFICE O/P EST LOW 20 MIN: CPT | Performed by: NURSE PRACTITIONER

## 2021-06-18 PROCEDURE — 11042 DBRDMT SUBQ TIS 1ST 20SQCM/<: CPT

## 2021-06-18 RX ORDER — LIDOCAINE HYDROCHLORIDE 20 MG/ML
JELLY TOPICAL ONCE
Status: CANCELLED | OUTPATIENT
Start: 2021-06-18 | End: 2021-06-18

## 2021-06-18 RX ORDER — BETAMETHASONE DIPROPIONATE 0.05 %
OINTMENT (GRAM) TOPICAL ONCE
Status: CANCELLED | OUTPATIENT
Start: 2021-06-18 | End: 2021-06-18

## 2021-06-18 RX ORDER — CLOBETASOL PROPIONATE 0.5 MG/G
OINTMENT TOPICAL ONCE
Status: CANCELLED | OUTPATIENT
Start: 2021-06-18 | End: 2021-06-18

## 2021-06-18 RX ORDER — BACITRACIN, NEOMYCIN, POLYMYXIN B 400; 3.5; 5 [USP'U]/G; MG/G; [USP'U]/G
OINTMENT TOPICAL ONCE
Status: CANCELLED | OUTPATIENT
Start: 2021-06-18 | End: 2021-06-18

## 2021-06-18 RX ORDER — LIDOCAINE HYDROCHLORIDE 40 MG/ML
SOLUTION TOPICAL ONCE
Status: CANCELLED | OUTPATIENT
Start: 2021-06-18 | End: 2021-06-18

## 2021-06-18 RX ORDER — LIDOCAINE 50 MG/G
OINTMENT TOPICAL ONCE
Status: CANCELLED | OUTPATIENT
Start: 2021-06-18 | End: 2021-06-18

## 2021-06-18 RX ORDER — BACITRACIN ZINC AND POLYMYXIN B SULFATE 500; 1000 [USP'U]/G; [USP'U]/G
OINTMENT TOPICAL ONCE
Status: CANCELLED | OUTPATIENT
Start: 2021-06-18 | End: 2021-06-18

## 2021-06-18 RX ORDER — GINSENG 100 MG
CAPSULE ORAL ONCE
Status: CANCELLED | OUTPATIENT
Start: 2021-06-18 | End: 2021-06-18

## 2021-06-18 RX ORDER — GENTAMICIN SULFATE 1 MG/G
OINTMENT TOPICAL ONCE
Status: CANCELLED | OUTPATIENT
Start: 2021-06-18 | End: 2021-06-18

## 2021-06-18 RX ORDER — LIDOCAINE 40 MG/G
CREAM TOPICAL ONCE
Status: CANCELLED | OUTPATIENT
Start: 2021-06-18 | End: 2021-06-18

## 2021-06-18 ASSESSMENT — PAIN DESCRIPTION - LOCATION: LOCATION: FOOT

## 2021-06-18 ASSESSMENT — PAIN SCALES - GENERAL: PAINLEVEL_OUTOF10: 3

## 2021-06-18 ASSESSMENT — PAIN - FUNCTIONAL ASSESSMENT: PAIN_FUNCTIONAL_ASSESSMENT: PREVENTS OR INTERFERES SOME ACTIVE ACTIVITIES AND ADLS

## 2021-06-18 ASSESSMENT — PAIN DESCRIPTION - PAIN TYPE: TYPE: CHRONIC PAIN

## 2021-06-18 ASSESSMENT — PAIN DESCRIPTION - ONSET: ONSET: SUDDEN

## 2021-06-18 ASSESSMENT — PAIN DESCRIPTION - DESCRIPTORS: DESCRIPTORS: NUMBNESS

## 2021-06-18 ASSESSMENT — PAIN DESCRIPTION - ORIENTATION: ORIENTATION: RIGHT;LEFT

## 2021-06-18 ASSESSMENT — PAIN DESCRIPTION - PROGRESSION: CLINICAL_PROGRESSION: NOT CHANGED

## 2021-06-18 ASSESSMENT — PAIN DESCRIPTION - FREQUENCY: FREQUENCY: CONTINUOUS

## 2021-06-18 NOTE — PROGRESS NOTES
[facility], [place] Initial Visit      Whitney Garcia  AGE: 79 y.o. GENDER: male  : 1950  EPISODE DATE:  2021   Referred by: Hospital Inpatient Team    Subjective:     CHIEF COMPLAINT diabetic ulcers to the toes of right and left foot     HISTORY of PRESENT ILLNESS      Whitney Garcia is a 79 y.o. male who presents to the [address] [place] [address] for an initial visit for evaluation and treatment of Chronic diabetic  ulcer(s) of  Rt. foot hallux, Lt. foot hallux, 2nd toe. The condition is of moderate severity. The ulcer has been present since 2020, when these issues began and he started to receive medical attention for his feet. The underlying cause is thought to be diabetes. The patients care to date has included multiple amputations on both feet. He has received imaging, and is being worked up by cardiovascular for arterial insuficiency. He also has a subclavian cath and receiving IV antibiotics for a 6 week course. The patient has significant underlying medical conditions as below. Patient was a smoker until very recently. He is a diabetic who takes injectable insulin and now checks his BG AC/HS. He is on plavix.     Wound Pain Timing/Severity: waxing and waning  Quality of pain: shooting, pressure  Severity of pain:  3 / 10   Modifying Factors: edema, diabetes, poor glucose control and smoking  Associated Signs/Symptoms: edema, erythema, drainage and pain        PAST MEDICAL HISTORY        Diagnosis Date    Acid reflux     Acute MI ([facility] 2008    Arthritis     Back    Broken teeth     Upper Front    CAD (coronary artery disease)     Sees Dr. Shruthi Vigil [facility])     per old chart    CHF (congestive heart failure) (HCC)     Chronic back pain     Chronic kidney disease     STAGE 3 KIDNEY FAILURE- \"from my diabetes- do not follow with any one- have seen Dr Abi Hayes in the past\"    Diabetes mellitus ([facility] [address] Dx 1965    per old chart pt has been diabetic since age 13    Diabetic neuropathy ([address]     \"in my feet\"    H/O cardiovascular stress test 08/25/2016    H/O Doppler ultrasound 09/27/2018    Moderate disease of the right lower extremity with an JALEN of 0.72.   Moderate to severe disease of the left lower extremity with an JALEN of 0.55.   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    Neuropathy     both feet    neuropathy     PAD (peripheral artery disease) ([address] 09/27/2018    PVD (peripheral vascular disease) ([address]     Sick sinus syndrome ([address]     Sleep apnea     \"sleep study 3 yrs ago- could not tolerate the cpap made me too dry\"    Spinal stenosis     Teeth missing     Upper And Lower    Type 2 diabetes mellitus without complication ([address]        PAST SURGICAL HISTORY    Past Surgical History:   Procedure Laterality Date    CARDIAC CATHETERIZATION      per old chart done 10/2014    CARDIAC CATHETERIZATION  07/14/2017    with angiography of leg    CARDIAC CATHETERIZATION  11/20/2018    PATENT STENTS OF ALL THREE MAJOR VESSELS    CARDIAC DEFIBRILLATOR PLACEMENT  06/04/2010    [employer] [address] Defibrillator Implanted    COLECTOMY Right 08/26/2016    laparascopic; robotic assisted    COLONOSCOPY  08/04/2016    CORONARY ANGIOPLASTY      \"15 Heart Stents\"    CORONARY ANGIOPLASTY WITH STENT PLACEMENT      per old chart had angio with stent to circumflex and obtuse marginal artery at [facility] 5/2010( old chart also gives hx of stent placement done 2000,2004 and 2005)    DENTAL SURGERY      Teeth Extracted In Past    IR TUNNELED CATHETER PLACEMENT GREATER THAN 5 YEARS  6/14/2021    IR TUNNELED CATHETER PLACEMENT GREATER THAN 5 YEARS 6/14/2021 [facility] SPECIAL PROCEDURES    PACEMAKER PLACEMENT  06/04/2010    [employer] [address] Defibrillator Implanted    TOE AMPUTATION Right 09/12/2017    Rt 3rd toe    TOE AMPUTATION Right 01/09/2018     Right 5th toe amputation and Toenails trimmed left 2,3,4 and 5th toes    TOE AMPUTATION Left 12/26/2020    LEFT GREAT TOE AMPUTATION performed by Halina Dickerson MD at [facility]      per old chart had balloon angioplasty right superfical femoral artery,right popliteal artery,,right ant.tibial artery, right tibioperoneal trunk, and right post.tibial artery wna stent placement right popliteal artery and superfical femoral artery 7/2012       FAMILY HISTORY    Family History   Problem Relation Age of Onset    Diabetes Mother     Stroke Mother     High Blood Pressure Mother    [facility] Vision Loss Mother     Cancer Father         Prostate Cancer    Diabetes Sister     Neuropathy Sister     Other Sister         \"Breathing Problems\"    Heart Disease Sister     Early Death Sister 62        Heart Complications    Cancer Brother         \"Stomach Cancer\"    High Blood Pressure Brother     Diabetes Brother     Heart Disease Brother     High Blood Pressure Brother     Cancer Son         \"Testicle Cancer\"       SOCIAL HISTORY    Social History     Tobacco Use    Smoking status: Former Smoker     Packs/day: 0.10     Years: 36.00     Pack years: 3.60     Types: Cigars     Start date: 1/1/1980    Smokeless tobacco: Never Used    Tobacco comment: Client states he has stopped smoking   Vaping Use    Vaping Use: Never used   Substance Use Topics    Alcohol use: No    Drug use: Yes     Types: Marijuana       ALLERGIES    Allergies   Allergen Reactions    Pcn [Penicillins] Hives    Fentanyl Itching       MEDICATIONS    Current Outpatient Medications on File Prior to Encounter   Medication Sig Dispense Refill    chlorthalidone (HYGROTEN) 50 MG tablet Take 1 tablet by mouth daily 30 tablet 0    cefTRIAXone (ROCEPHIN) 500 MG injection Infuse 2,000 mg intravenously daily 75948 mg 0    amLODIPine (NORVASC) 10 MG tablet Take 1 tablet by mouth daily 30 tablet 0    oxyCODONE (ROXICODONE) 5 MG immediate release tablet Take 1 tablet by mouth every 4 hours as needed for Pain for up to 3 days. 12 tablet 0    pregabalin (LYRICA) 75 MG capsule Take 1 capsule by mouth 3 times daily for 30 days. 90 capsule 0    atorvastatin (LIPITOR) 40 MG tablet Take 1 tablet by mouth nightly 30 tablet 3    carvedilol (COREG) 3.125 MG tablet Take 1 tablet by mouth 2 times daily 60 tablet 3    albuterol sulfate HFA (VENTOLIN HFA) 108 (90 Base) MCG/ACT inhaler Inhale 2 puffs into the lungs every 4 hours as needed for Wheezing 1 Inhaler 3    Calcium Alginate (ALGICELL CALCIUM DRESSING 4\"X8) MISC Apply 1 Device topically daily 30 each 3    PROMOGRAN COREY WOUND DRESSING MATRIX Apply topically Apply to left daily and cover with gauze 1 Package 3    clopidogrel (PLAVIX) 75 MG tablet Take 1 tablet by mouth daily 30 tablet 11     No current facility-administered medications on file prior to encounter.        PROBLEM LIST    Patient Active Problem List   Diagnosis    Chronic coronary artery disease    Automatic implantable cardioverter-defibrillator in situ    Chronic combined systolic and diastolic heart failure (HCC)    [facility], stage III (moderate) ([facility]    Mixed hyperlipidemia    Sick sinus syndrome ([address]    Type 2 diabetes mellitus with diabetic polyneuropathy ([address]    Spinal stenosis of lumbar region    Obesity, Class I, BMI 30-34.9    Postoperative hypertension    S/P partial colectomy    Tubulovillous adenoma of colon    Microalbuminuria    WD-PVD (peripheral vascular disease) ([address]    Limb ischemia    Necrotic toes (HCC)    Toe gangrene (HCC)    Diabetic foot infection ([address]    Chronic kidney disease (CKD) stage G3a/A2, moderately decreased glomerular filtration rate (GFR) between 45-59 mL/min/1.73 square meter and albuminuria creatinine ratio between  mg/g ([facility])    DM (diabetes mellitus) ([facility]    Edema    ICD (implantable cardioverter-defibrillator) battery depletion    Hyperkalemia    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[facility])    Ischemia of toe    Acute kidney injury ([facility] [address] [facility]    Fluid overload    DM (diabetes mellitus) ([facility] [address] [facility]    Hypertensive emergency    Precordial pain    Acute chest pain    Unstable angina ([facility])    Chronic kidney disease (CKD) stage G3a/A3, moderately decreased glomerular filtration rate (GFR) between 45-59 mL/min/1.73 square meter and albuminuria creatinine ratio greater than 300 mg/g ([facility])    Cardiomyopathy ([facility])    Hypertensive crisis    Diabetic neuropathy ([facility])    HTN (hypertension)    Epigastric pain    Acute on chronic congestive heart failure ([facility])    Bilateral lower extremity edema    Acute on chronic systolic CHF (congestive heart failure) ([facility])    Diabetic foot ulcer with osteomyelitis ([facility]    Visit for wound check    Moderate malnutrition ([facility] [facility]    Long term (current) use of antibiotics    WD-Diabetic ulcer of toe of right foot associated with type 2 diabetes mellitus, with fat layer exposed ([facility] [address]    WD-Diabetic ulcer of toe of left foot associated with type 2 diabetes mellitus, with fat layer exposed ([facility] [facility]       REVIEW OF SYSTEMS    Constitutional: negative for anorexia, chills, fatigue, fevers, malaise, sweats and weight loss  Respiratory: negative for pneumonia, shortness of breath, sputum, stridor and wheezing  Cardiovascular: negative for near-syncope, orthopnea, palpitations, paroxysmal nocturnal dyspnea, syncope and tachypnea  Integument/breast: positive for skin lesion(s)      Objective:      /69   Pulse 58   Temp 97.5 °F (36.4 °C) (Temporal)   Resp 18     PHYSICAL EXAM  General Appearance: alert and oriented to person, place and time, well-developed and well-nourished, in no acute distress  Pulmonary/Chest: clear to Debrided:  fibrin, biofilm, slough, necrotic/eschar and exudate    Pre Debridement Measurements:  Are located in the Wound Documentation Flow Sheet    All active wounds listed below with today's date are evaluated  Wound(s)    debrided this date include # : 1, 2 and 3     Post  Debridement Measurements:  Wound 07/06/17 Other (Comment) Toe (Comment  which one) (Active)   Number of days: 9935       Wound 12/10/20 Left great toe  (Active)   Wound Image   06/18/21 1444   Wound Etiology Diabetic 06/18/21 1444   Dressing Status New dressing applied 06/18/21 1532   Wound Cleansed Soap and water 06/18/21 1444   Dressing/Treatment ABD; Betadine swabs/povidone iodine 06/15/21 0803   Wound Length (cm) 1.4 cm 06/18/21 1444   Wound Width (cm) 2.5 cm 06/18/21 1444   Wound Depth (cm) 0.2 cm 06/18/21 1444   Wound Surface Area (cm^2) 3.5 cm^2 06/18/21 1444   Change in Wound Size % (l*w) 12.5 06/18/21 1444   Wound Volume (cm^3) 0.7 cm^3 06/18/21 1444   Wound Healing % -75 06/18/21 1444   Post-Procedure Length (cm) 1.4 cm 06/18/21 1514   Post-Procedure Width (cm) 2.5 cm 06/18/21 1514   Post-Procedure Depth (cm) 0.2 cm 06/18/21 1514   Post-Procedure Surface Area (cm^2) 3.5 cm^2 06/18/21 1514   Post-Procedure Volume (cm^3) 0.7 cm^3 06/18/21 1514   Distance Tunneling (cm) 0 cm 06/18/21 1444   Tunneling Position ___ O'Clock 0 06/18/21 1444   Undermining Starts ___ O'Clock 0 06/18/21 1444   Undermining Ends___ O'Clock 0 06/18/21 1444   Undermining Maxium Distance (cm) 0 06/18/21 1444   Wound Assessment Slough 06/18/21 1444   Drainage Amount Moderate 06/18/21 1444   Drainage Description Serosanguinous 06/18/21 1444   Odor None 06/18/21 1444   Dina-wound Assessment Dry/flaky 06/18/21 1444   Margins Defined edges 06/18/21 1444   Wound Thickness Description not for Pressure Injury Full thickness 06/18/21 1444   Number of days: 190       Wound 12/10/20 Foot Left;Plantar (Active)   Number of days: 190       Wound 12/10/20 Foot Left;Lateral fluid filled blister (Active)   Number of days: 189       Wound 06/08/21 Toe (Comment  which one) Right great toe (Active)   Wound Image   06/18/21 1444   Wound Etiology Diabetic 06/18/21 1444   Dressing Status New dressing applied 06/18/21 1532   Wound Cleansed Soap and water 06/18/21 1444   Dressing/Treatment ABD; Betadine swabs/povidone iodine 06/15/21 0803   Wound Length (cm) 1.2 cm 06/18/21 1444   Wound Width (cm) 1.4 cm 06/18/21 1444   Wound Depth (cm) 0.1 cm 06/18/21 1444   Wound Surface Area (cm^2) 1.68 cm^2 06/18/21 1444   Change in Wound Size % (l*w) 17.65 06/18/21 1444   Wound Volume (cm^3) 0.17 cm^3 06/18/21 1444   Wound Healing % 59 06/18/21 1444   Post-Procedure Length (cm) 1.2 cm 06/18/21 1514   Post-Procedure Width (cm) 1.4 cm 06/18/21 1514   Post-Procedure Depth (cm) 0.1 cm 06/18/21 1514   Post-Procedure Surface Area (cm^2) 1.68 cm^2 06/18/21 1514   Post-Procedure Volume (cm^3) 0.17 cm^3 06/18/21 1514   Distance Tunneling (cm) 0 cm 06/18/21 1444   Tunneling Position ___ O'Clock 0 06/18/21 1444   Undermining Starts ___ O'Clock 0 06/18/21 1444   Undermining Ends___ O'Clock 0 06/18/21 1444   Undermining Maxium Distance (cm) 0 06/18/21 1444   Wound Assessment Slough;Granulation tissue 06/18/21 1444   Drainage Amount Moderate 06/18/21 1444   Drainage Description Serosanguinous 06/18/21 1444   Odor None 06/18/21 1444   Dina-wound Assessment Dry/flaky 06/18/21 1444   Margins Defined edges 06/18/21 1444   Wound Thickness Description not for Pressure Injury Full thickness 06/18/21 1444   Number of days: 10       Wound 06/08/21 Toe (Comment  which one) Left 2nd toe (Active)   Wound Image   06/18/21 1444   Wound Etiology Diabetic 06/18/21 1444   Dressing Status New dressing applied 06/18/21 1532   Wound Cleansed Soap and water 06/18/21 1444   Dressing/Treatment ABD; Betadine swabs/povidone iodine 06/15/21 0803   Wound Length (cm) 2.3 cm 06/18/21 1444   Wound Width (cm) 1.8 cm 06/18/21 1444   Wound Depth (cm) 0.1 cm

## 2021-06-21 ENCOUNTER — HOSPITAL ENCOUNTER (OUTPATIENT)
Age: 71
Setting detail: SPECIMEN
Discharge: HOME | End: 2021-06-21
Payer: MEDICARE

## 2021-06-21 LAB
CREAT SERPL-MCNC: 1.5 MG/DL (ref 0.9–1.3)
DOSE AMOUNT: NORMAL
DOSE TIME: NORMAL
GFR AFRICAN AMERICAN: 56 ML/MIN/1.73M2
GFR NON-AFRICAN AMERICAN: 46 ML/MIN/1.73M2
VANCOMYCIN RANDOM: 6.2 UG/ML

## 2021-06-21 PROCEDURE — 80202 ASSAY OF VANCOMYCIN: CPT

## 2021-06-21 PROCEDURE — 82565 ASSAY OF CREATININE: CPT

## 2021-06-22 RX ORDER — OXYCODONE HYDROCHLORIDE 5 MG/1
5 TABLET ORAL EVERY 6 HOURS PRN
Qty: 16 TABLET | Refills: 0 | Status: SHIPPED | OUTPATIENT
Start: 2021-06-22 | End: 2021-06-28 | Stop reason: SDUPTHER

## 2021-06-24 ENCOUNTER — OFFICE VISIT (OUTPATIENT)
Dept: SURGERY | Age: 71
End: 2021-06-24
Payer: MEDICARE

## 2021-06-24 VITALS
WEIGHT: 260 LBS | HEART RATE: 61 BPM | BODY MASS INDEX: 35.21 KG/M2 | HEIGHT: 72 IN | OXYGEN SATURATION: 97 % | SYSTOLIC BLOOD PRESSURE: 136 MMHG | DIASTOLIC BLOOD PRESSURE: 60 MMHG

## 2021-06-24 DIAGNOSIS — I96 TOE GANGRENE (HCC): Primary | ICD-10-CM

## 2021-06-24 PROCEDURE — G8417 CALC BMI ABV UP PARAM F/U: HCPCS | Performed by: SURGERY

## 2021-06-24 PROCEDURE — 1111F DSCHRG MED/CURRENT MED MERGE: CPT | Performed by: SURGERY

## 2021-06-24 PROCEDURE — G8427 DOCREV CUR MEDS BY ELIG CLIN: HCPCS | Performed by: SURGERY

## 2021-06-24 PROCEDURE — 3017F COLORECTAL CA SCREEN DOC REV: CPT | Performed by: SURGERY

## 2021-06-24 PROCEDURE — 99214 OFFICE O/P EST MOD 30 MIN: CPT | Performed by: SURGERY

## 2021-06-24 PROCEDURE — 1123F ACP DISCUSS/DSCN MKR DOCD: CPT | Performed by: SURGERY

## 2021-06-24 PROCEDURE — 4040F PNEUMOC VAC/ADMIN/RCVD: CPT | Performed by: SURGERY

## 2021-06-24 PROCEDURE — 1036F TOBACCO NON-USER: CPT | Performed by: SURGERY

## 2021-06-25 ENCOUNTER — HOSPITAL ENCOUNTER (OUTPATIENT)
Dept: WOUND CARE | Age: 71
Discharge: HOME | End: 2021-06-25

## 2021-06-28 ENCOUNTER — HOSPITAL ENCOUNTER (OUTPATIENT)
Age: 71
Setting detail: SPECIMEN
Discharge: HOME | End: 2021-06-28
Payer: MEDICARE

## 2021-06-28 ENCOUNTER — HOSPITAL ENCOUNTER (OUTPATIENT)
Dept: WOUND CARE | Age: 71
Discharge: HOME | End: 2021-06-28
Payer: MEDICARE

## 2021-06-28 VITALS
SYSTOLIC BLOOD PRESSURE: 200 MMHG | DIASTOLIC BLOOD PRESSURE: 79 MMHG | TEMPERATURE: 97.7 F | HEART RATE: 57 BPM | RESPIRATION RATE: 18 BRPM

## 2021-06-28 DIAGNOSIS — E11.621 DIABETIC ULCER OF TOE OF RIGHT FOOT ASSOCIATED WITH TYPE 2 DIABETES MELLITUS, WITH FAT LAYER EXPOSED (HCC): Primary | ICD-10-CM

## 2021-06-28 DIAGNOSIS — R52 PERSISTENT WOUND PAIN: ICD-10-CM

## 2021-06-28 DIAGNOSIS — L97.522 DIABETIC ULCER OF TOE OF LEFT FOOT ASSOCIATED WITH TYPE 2 DIABETES MELLITUS, WITH FAT LAYER EXPOSED (HCC): ICD-10-CM

## 2021-06-28 DIAGNOSIS — E11.621 DIABETIC ULCER OF TOE OF LEFT FOOT ASSOCIATED WITH TYPE 2 DIABETES MELLITUS, WITH FAT LAYER EXPOSED (HCC): ICD-10-CM

## 2021-06-28 DIAGNOSIS — L97.512 DIABETIC ULCER OF TOE OF RIGHT FOOT ASSOCIATED WITH TYPE 2 DIABETES MELLITUS, WITH FAT LAYER EXPOSED (HCC): Primary | ICD-10-CM

## 2021-06-28 DIAGNOSIS — I73.9 PVD (PERIPHERAL VASCULAR DISEASE) (HCC): ICD-10-CM

## 2021-06-28 LAB
CREAT SERPL-MCNC: 1.4 MG/DL (ref 0.9–1.3)
DOSE AMOUNT: NORMAL
DOSE TIME: NORMAL
GFR AFRICAN AMERICAN: >60 ML/MIN/1.73M2
GFR NON-AFRICAN AMERICAN: 50 ML/MIN/1.73M2
VANCOMYCIN RANDOM: 17.9 UG/ML

## 2021-06-28 PROCEDURE — 11042 DBRDMT SUBQ TIS 1ST 20SQCM/<: CPT | Performed by: NURSE PRACTITIONER

## 2021-06-28 PROCEDURE — 11042 DBRDMT SUBQ TIS 1ST 20SQCM/<: CPT

## 2021-06-28 PROCEDURE — 82565 ASSAY OF CREATININE: CPT

## 2021-06-28 PROCEDURE — 80202 ASSAY OF VANCOMYCIN: CPT

## 2021-06-28 RX ORDER — LIDOCAINE HYDROCHLORIDE 20 MG/ML
JELLY TOPICAL ONCE
Status: CANCELLED | OUTPATIENT
Start: 2021-06-28 | End: 2021-06-28

## 2021-06-28 RX ORDER — BETAMETHASONE DIPROPIONATE 0.05 %
OINTMENT (GRAM) TOPICAL ONCE
Status: CANCELLED | OUTPATIENT
Start: 2021-06-28 | End: 2021-06-28

## 2021-06-28 RX ORDER — LIDOCAINE 50 MG/G
OINTMENT TOPICAL ONCE
Status: DISCONTINUED | OUTPATIENT
Start: 2021-06-28 | End: 2021-06-29 | Stop reason: HOSPADM

## 2021-06-28 RX ORDER — BACITRACIN ZINC AND POLYMYXIN B SULFATE 500; 1000 [USP'U]/G; [USP'U]/G
OINTMENT TOPICAL ONCE
Status: CANCELLED | OUTPATIENT
Start: 2021-06-28 | End: 2021-06-28

## 2021-06-28 RX ORDER — LIDOCAINE HYDROCHLORIDE 40 MG/ML
SOLUTION TOPICAL ONCE
Status: CANCELLED | OUTPATIENT
Start: 2021-06-28 | End: 2021-06-28

## 2021-06-28 RX ORDER — CLOBETASOL PROPIONATE 0.5 MG/G
OINTMENT TOPICAL ONCE
Status: CANCELLED | OUTPATIENT
Start: 2021-06-28 | End: 2021-06-28

## 2021-06-28 RX ORDER — BACITRACIN, NEOMYCIN, POLYMYXIN B 400; 3.5; 5 [USP'U]/G; MG/G; [USP'U]/G
OINTMENT TOPICAL ONCE
Status: CANCELLED | OUTPATIENT
Start: 2021-06-28 | End: 2021-06-28

## 2021-06-28 RX ORDER — LIDOCAINE 40 MG/G
CREAM TOPICAL ONCE
Status: CANCELLED | OUTPATIENT
Start: 2021-06-28 | End: 2021-06-28

## 2021-06-28 RX ORDER — GENTAMICIN SULFATE 1 MG/G
OINTMENT TOPICAL ONCE
Status: CANCELLED | OUTPATIENT
Start: 2021-06-28 | End: 2021-06-28

## 2021-06-28 RX ORDER — OXYCODONE HYDROCHLORIDE 5 MG/1
5 TABLET ORAL EVERY 6 HOURS PRN
Qty: 44 TABLET | Refills: 0 | Status: SHIPPED | OUTPATIENT
Start: 2021-06-28 | End: 2021-07-09

## 2021-06-28 RX ORDER — GINSENG 100 MG
CAPSULE ORAL ONCE
Status: CANCELLED | OUTPATIENT
Start: 2021-06-28 | End: 2021-06-28

## 2021-06-28 RX ORDER — LIDOCAINE 50 MG/G
OINTMENT TOPICAL ONCE
Status: CANCELLED | OUTPATIENT
Start: 2021-06-28 | End: 2021-06-28

## 2021-06-28 ASSESSMENT — PAIN SCALES - GENERAL: PAINLEVEL_OUTOF10: 9

## 2021-06-28 NOTE — PROGRESS NOTES
Wound Care Center Progress Note With Procedure    Mayank Frances  AGE: 79 y.o. GENDER: male  : 1950  EPISODE DATE:  2021     Subjective:     Chief Complaint   Patient presents with    Wound Check     BLE          HISTORY of PRESENT ILLNESS     Mayank Frances is a 79 y.o. male who presents to the [address] [place] [address] for evaluation and treatment of Chronic diabetic ulcer(s) of the right  foot hallux, left. foot hallux and left second toe. The condition is of marked severity. The ulcer has been present since 2020, when these issues initially began and he started to receive medical attention for his feet. The underlying cause is thought to be diabetes. The patients care to date has included multiple amputations on both feet. He has received imaging, and is being worked up by cardiovascular for arterial insuficiency. He also has a subclavian cath and receiving IV antibiotics for a 6 week course. The patient has significant underlying medical conditions as below. Patient was a smoker until very recently. He is a diabetic who takes injectable insulin and now checks his BG AC/HS. Last HA1c was 7.9 on 21.  He is on plavix.     Wound Pain Timing/Severity: waxing and waning  Quality of pain: shooting, pressure  Severity of pain:  5 / 10   Modifying Factors: edema, diabetes, poor glucose control and smoking  Associated Signs/Symptoms: edema, erythema, drainage and pain        PAST MEDICAL HISTORY        Diagnosis Date    Acid reflux     Acute MI ([facility] 2008    Arthritis     Back    Broken teeth     Upper Front    CAD (coronary artery disease)     Sees Dr. Cole Orlando [facility])     per old chart    CHF (congestive heart failure) (HCC)     Chronic back pain     Chronic kidney disease     STAGE 3 KIDNEY FAILURE- \"from my diabetes- do not follow with any one- have seen Dr Zoanne Severs in the past\"    Diabetes mellitus ([facility] Dx 1965    per old chart pt has been diabetic since age 14    CATHETER PLACEMENT GREATER THAN 5 YEARS 6/14/2021 [facility] SPECIAL PROCEDURES    PACEMAKER PLACEMENT  06/04/2010    [employer]tronic Secura DR Defibrillator Implanted    TOE AMPUTATION Right 09/12/2017    Rt 3rd toe    TOE AMPUTATION Right 01/09/2018     Right 5th toe amputation and Toenails trimmed left 2,3,4 and 5th toes    TOE AMPUTATION Left 12/26/2020    LEFT GREAT TOE AMPUTATION performed by Halina Dickerson MD at [facility]      per old chart had balloon angioplasty right superfical femoral artery,right popliteal artery,,right ant.tibial artery, right tibioperoneal trunk, and right post.tibial artery wna stent placement right popliteal artery and superfical femoral artery 7/2012       FAMILY HISTORY    Family History   Problem Relation Age of Onset    Diabetes Mother     Stroke Mother     High Blood Pressure Mother    Greg Blount Vision Loss Mother     Cancer Father         Prostate Cancer    Diabetes Sister     Neuropathy Sister     Other Sister         \"Breathing Problems\"    Heart Disease Sister     Early Death Sister 62        Heart Complications    Cancer Brother         \"Stomach Cancer\"    High Blood Pressure Brother     Diabetes Brother     Heart Disease Brother     High Blood Pressure Brother     Cancer Son         \"Testicle Cancer\"       SOCIAL HISTORY    Social History     Tobacco Use    Smoking status: Former Smoker     Packs/day: 0.10     Years: 36.00     Pack years: 3.60     Types: Cigars     Start date: 1/1/1980    Smokeless tobacco: Never Used    Tobacco comment: Client states he has stopped smoking   Vaping Use    Vaping Use: Never used   Substance Use Topics    Alcohol use: No    Drug use: Yes     Types: Marijuana       ALLERGIES    Allergies   Allergen Reactions    Pcn [Penicillins] Hives    Fentanyl Itching       MEDICATIONS    Current Outpatient Medications on File Prior to Encounter   Medication Sig Dispense Refill    chlorthalidone (HYGROTEN) 50 MG tablet Take 1 tablet by mouth daily 30 tablet 0    cefTRIAXone (ROCEPHIN) 500 MG injection Infuse 2,000 mg intravenously daily 97563 mg 0    amLODIPine (NORVASC) 10 MG tablet Take 1 tablet by mouth daily 30 tablet 0    pregabalin (LYRICA) 75 MG capsule Take 1 capsule by mouth 3 times daily for 30 days. 90 capsule 0    atorvastatin (LIPITOR) 40 MG tablet Take 1 tablet by mouth nightly 30 tablet 3    carvedilol (COREG) 3.125 MG tablet Take 1 tablet by mouth 2 times daily 60 tablet 3    albuterol sulfate HFA (VENTOLIN HFA) 108 (90 Base) MCG/ACT inhaler Inhale 2 puffs into the lungs every 4 hours as needed for Wheezing 1 Inhaler 3    Calcium Alginate (ALGICELL CALCIUM DRESSING 4\"X8) MISC Apply 1 Device topically daily 30 each 3    PROMOGRAN COREY WOUND DRESSING MATRIX Apply topically Apply to left daily and cover with gauze 1 Package 3    clopidogrel (PLAVIX) 75 MG tablet Take 1 tablet by mouth daily 30 tablet 11     No current facility-administered medications on file prior to encounter. REVIEW OF SYSTEMS    Pertinent items are noted in HPI. Constitutional: Negative for systemic symptoms including fever, chills and malaise. Objective:      BP (!) 200/79   Pulse 57   Temp 97.7 °F (36.5 °C) (Temporal)   Resp 18     PHYSICAL EXAM    General: The patient is in no acute distress. Mental status:  Patient is appropriate, is  oriented to place and plan of care.   Dermatologic exam: Visual inspection of the periwound reveals the skin to be moist and edematous  Wound exam: see wound description below in procedure note      Assessment:     Problem List Items Addressed This Visit     WD-PVD (peripheral vascular disease) ([address]    Relevant Medications    lidocaine (XYLOCAINE) 5 % ointment    Other Relevant Orders    Initiate Outpatient Wound Care Protocol    WD-Diabetic ulcer of toe of right foot associated with type 2 diabetes mellitus, with fat layer exposed ([address] - Primary    Relevant Medications    lidocaine (XYLOCAINE) 5 % ointment    oxyCODONE (ROXICODONE) 5 MG immediate release tablet    Other Relevant Orders    Initiate Outpatient Wound Care Protocol    WD-Diabetic ulcer of toe of left foot associated with type 2 diabetes mellitus, with fat layer exposed ([address]    Relevant Medications    lidocaine (XYLOCAINE) 5 % ointment    oxyCODONE (ROXICODONE) 5 MG immediate release tablet    Other Relevant Orders    Initiate Outpatient Wound Care Protocol    Persistent wound pain    Relevant Medications    oxyCODONE (ROXICODONE) 5 MG immediate release tablet        Procedure Note    Indications:  Based on my examination of this patient's wound(s) today, sharp excision into necrotic subcutaneous tissue is required to promote healing and evaluate the extent of previous healing. Performed by: MICKY Barone CNP    Consent obtained: Yes    Time out taken:  Yes    Pain Control: Anesthetic  Anesthetic: 5% Lidocaine Ointment Topical     Debridement:Excisional Debridement    Using curette the wound(s) was/were sharply debrided down through and including the removal of subcutaneous tissue. Devitalized Tissue Debrided:  biofilm, slough and exudate    Pre Debridement Measurements:  Are located in the Wound Documentation Flow Sheet    All active wounds listed below with today's date are evaluated  Wound(s)    debrided this date include # : 1, 2 and 3     Post  Debridement Measurements:  Wound 12/10/20 Left great toe # 3 (Active)   Wound Image   06/18/21 1444   Wound Etiology Diabetic 06/18/21 1444   Dressing Status New dressing applied 06/28/21 1525   Wound Cleansed Wound cleanser;Cleansed with saline 06/28/21 1420   Dressing/Treatment ABD; Betadine swabs/povidone iodine 06/15/21 0803   Offloading for Diabetic Foot Ulcers Post op shoe 06/28/21 1420   Wound Length (cm) 1.8 cm 06/28/21 1420   Wound Width (cm) 3 cm 06/28/21 1420   Wound Depth (cm) 0.2 cm 06/28/21 1420   Wound Surface Area (cm^2) 5.4 cm^2 06/28/21 1420 Distance Tunneling (cm) 0 cm 06/28/21 1420   Tunneling Position ___ O'Clock 0 06/28/21 1420   Undermining Starts ___ O'Clock 0 06/28/21 1420   Undermining Ends___ O'Clock 0 06/28/21 1420   Undermining Maxium Distance (cm) 0 06/28/21 1420   Wound Assessment Slough;Granulation tissue 06/28/21 1420   Drainage Amount Moderate 06/28/21 1420   Drainage Description Serosanguinous 06/28/21 1420   Odor None 06/28/21 1420   Dina-wound Assessment Dry/flaky 06/28/21 1420   Margins Defined edges 06/28/21 1420   Wound Thickness Description not for Pressure Injury Full thickness 06/28/21 1420   Number of days: 20       Wound 06/08/21 Toe (Comment  which one) Left 2nd toe # 2 (Active)   Wound Image   06/18/21 1444   Wound Etiology Diabetic 06/18/21 1444   Dressing Status New dressing applied 06/28/21 1525   Wound Cleansed Wound cleanser;Cleansed with saline 06/28/21 1420   Dressing/Treatment ABD; Betadine swabs/povidone iodine 06/15/21 0803   Offloading for Diabetic Foot Ulcers Post op shoe 06/28/21 1420   Wound Length (cm) 2.5 cm 06/28/21 1420   Wound Width (cm) 2.3 cm 06/28/21 1420   Wound Depth (cm) 0.1 cm 06/28/21 1420   Wound Surface Area (cm^2) 5.75 cm^2 06/28/21 1420   Change in Wound Size % (l*w) -99.65 06/28/21 1420   Wound Volume (cm^3) 0.575 cm^3 06/28/21 1420   Wound Healing % 33 06/28/21 1420   Post-Procedure Length (cm) 2.5 cm 06/28/21 1444   Post-Procedure Width (cm) 2.3 cm 06/28/21 1444   Post-Procedure Depth (cm) 0.1 cm 06/28/21 1444   Post-Procedure Surface Area (cm^2) 5.75 cm^2 06/28/21 1444   Post-Procedure Volume (cm^3) 0.575 cm^3 06/28/21 1444   Distance Tunneling (cm) 0 cm 06/28/21 1420   Tunneling Position ___ O'Clock 0 06/28/21 1420   Undermining Starts ___ O'Clock 0 06/28/21 1420   Undermining Ends___ O'Clock 0 06/28/21 1420   Undermining Maxium Distance (cm) 0 06/28/21 [address]; Exposed structure bone;Granulation tissue 06/28/21 1420   Drainage Amount Moderate 06/28/21 1420 Drainage Description Serosanguinous 06/28/21 1420   Odor None 06/28/21 1420   Dina-wound Assessment Dry/flaky 06/28/21 1420   Margins Defined edges 06/28/21 1420   Wound Thickness Description not for Pressure Injury Full thickness 06/28/21 1420   Number of days: 20       Percent of Wound(s) Debrided: approximately 100%    Total  Area  Debrided:  12.75 sq cm     Bleeding:  Minimal    Hemostasis Achieved:  by pressure    Procedural Pain:  3  / 10     Post Procedural Pain:  0 / 10     Response to treatment:  Well tolerated by patient. Status of wound progress and description from last visit: The wounds have gotten larger. He is on IV antibiotics at home, and has a follow up appointment with Dr. Mouna Murcia. He goes to cardiology for follow up 6/30/21 and Dr. Elida Marr 7/1/21. The patient has had no pain medication since Friday 6/25/21 as he missed his wound clinic appointment. He states he hurts and just wants the left toes taken off. Will continue to prescribe patient opioid pain medication at this time. Patient understands all side effects and contraindications of opioids. No indication of abuse or seeking behavior. OARRS report checked at this time. We will continue to monitor. Wound History:  Left great toe amputation per Dr. Margie Rosa 12/26/21 with arterial intervention with Dr. Alphonso Tabares 12/20/21.   VL arteries 6/8/21  Impression   Aortic or iliac inflow disease, with loss of normal appearing triphasic   waveforms at the common femoral arteries.       Moderate stenosis within the proximal SFA on the right, with poststenotic   reduced velocities noted within the remainder of the left lower extremity,   with loss of normal multiphasic waveforms suggesting at least moderate   disease.  No significant focal severe stenosis seen.  Three-vessel runoff to   the right foot.       Moderate deep femoral artery stenosis on the right, with no severe stenosis   seen within the left lower extremity.  However the distal PTA

## 2021-06-29 NOTE — PROGRESS NOTES
Chief Complaint   Patient presents with   [address] f/u asa foot ulcers, ED 6/17/21          SUBJECTIVE:  HPI: Patient is here with complaints of bilateral toe wounds. He recently underwent amputations and redness of his several toes. The left second toe appears to be necrotic. Patient denies any fever or chills. Is wheelchair-bound. Patient has difficulty with mobility and as a result of generalized weakness. He has also severe bilateral lower extremity edema as well as neuropathy as a result of diabetes. I have reviewed the patient's(pertinent information to this visit) medical history, family history(scanned in  the [address] [place] [address] under \"patient questioner\"), social history and review of systems with the patient today in the office.             Past Surgical History:   Procedure Laterality Date    CARDIAC CATHETERIZATION      per old chart done 10/2014    CARDIAC CATHETERIZATION  07/14/2017    with angiography of leg    CARDIAC CATHETERIZATION  11/20/2018    PATENT STENTS OF ALL THREE MAJOR VESSELS    CARDIAC DEFIBRILLATOR PLACEMENT  06/04/2010    [employer] Secura  Defibrillator Implanted    COLECTOMY Right 08/26/2016    laparascopic; robotic assisted    COLONOSCOPY  08/04/2016    CORONARY ANGIOPLASTY      \"15 Heart Stents\"    CORONARY ANGIOPLASTY WITH STENT PLACEMENT      per old chart had angio with stent to circumflex and obtuse marginal artery at [facility] 5/2010( old chart also gives hx of stent placement done 2000,2004 and 2005)    DENTAL SURGERY      Teeth Extracted In Past    IR TUNNELED CATHETER PLACEMENT GREATER THAN 5 YEARS  6/14/2021    IR TUNNELED CATHETER PLACEMENT GREATER THAN 5 YEARS 6/14/2021 [facility] SPECIAL PROCEDURES    PACEMAKER PLACEMENT  06/04/2010    Medtronic Secura DR Defibrillator Implanted    TOE AMPUTATION Right 09/12/2017    Rt 3rd toe    TOE AMPUTATION Right 01/09/2018     Right 5th toe amputation and Toenails trimmed left 2,3,4 and 5th toes    TOE AMPUTATION Left 12/26/2020    LEFT GREAT TOE AMPUTATION performed by [facility] at [facility]      per old chart had balloon angioplasty right superfical femoral artery,right popliteal artery,,right ant.tibial artery, right tibioperoneal trunk, and right post.tibial artery wna stent placement right popliteal artery and superfical femoral artery 7/2012     Past Medical History:   Diagnosis Date    Acid reflux     Acute MI ([facility] 2004, 2008    Arthritis     Back    Broken teeth     Upper Front    CAD (coronary artery disease)     Sees Dr. Fani Barger [facility])     per old chart    CH[facility]congest[facility]     Chronic back pain     Chronic kidney disease     STAGE 3 KIDNEY FAILURE- \"from my diabetes- do not follow with any one- have seen Dr Cameron Chase in the past\"    Diabetes mellitus ([facility] Dx 1965    per old chart pt has been diabetic since age 13    Diabetic neuropathy ([facility]     \"in my feet\"    H/O cardiovascular stress test 08/25/2016    H/O Doppler ultrasound 09/27/2018    Moderate disease of the right lower extremity with an JALEN of 0.72.   Moderate to severe disease of the left lower extremity with an JALEN of 0.55.   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    Neuropathy     both feet    neuropathy     PAD (peripheral artery disease) ([facility] 09/27/2018    PVD (peripheral vascular disease) ([facility]     Sick sinus syndrome (HCC)     Sleep apnea     \"sleep study 3 yrs ago- could not tolerate the cpap made me too dry\"    Spinal stenosis     Teeth missing     Upper And Lower    Type 2 diabetes mellitus without complication ([facility]      Family History   Problem Relation Age of Onset    Diabetes Mother     Stroke Mother     High Blood Pressure Mother     Vision Loss Mother     Cancer Father         Prostate Cancer    Diabetes Sister     Neuropathy Sister     Other Sister         \"Breathing Problems\"    Heart Disease Sister     Early Death Sister 62        Heart Complications    Cancer Brother         \"Stomach Cancer\"    High Blood Pressure Brother     Diabetes Brother     Heart Disease Brother     High Blood Pressure Brother     Cancer Son         \"Testicle Cancer\"     Social History     Socioeconomic History    Marital status:      Spouse name: Not on file    Number of children: Not on file    Years of education: Not on file    Highest education level: Not on file   Occupational History    Not on file   Tobacco Use    Smoking status: Former Smoker     Packs/day: 0.10     Years: 36.00     Pack years: 3.60     Types: Cigars     Start date: 1/1/1980    Smokeless tobacco: Never Used    Tobacco comment: Client states he has stopped smoking   Vaping Use    Vaping Use: Never used   Substance and Sexual Activity    Alcohol use: No    Drug use: Yes     Types: Marijuana    Sexual activity: Never   Other Topics Concern    Not on file   Social History Narrative    Not on file     Social Determinants of Health     Financial Resource Strain:     Difficulty of Paying Living Expenses:    Food Insecurity:     Worried About Running Out of Food in the Last Year:     [address] in the Last Year:    Transportation Needs:     Lack of Transportation (Medical):      Lack of Transportation (Non-Medical):    Physical Activity:     Days of Exercise per Week:     Minutes of Exercise per Session:    Stress:     Feeling of Stress :    Social Connections:     Frequency of Communication with Friends and Family:     Frequency of Social Gatherings with Friends and Family:     Attends Mosque Services:     Active Member of Clubs or Organizations:     Attends Club or Organization Meetings:     Marital Status:    Intimate Partner Violence:     Fear of Current or Ex-Partner:     Emotionally Abused:     Physically Abused:     Sexually Abused:        Current Outpatient Medications   Medication Sig Dispense Refill    chlorthalidone (HYGROTEN) 50 MG tablet Take 1 tablet by mouth daily 30 tablet 0    cefTRIAXone (ROCEPHIN) 500 MG injection Infuse 2,000 mg intravenously daily 82399 mg 0    amLODIPine (NORVASC) 10 MG tablet Take 1 tablet by mouth daily 30 tablet 0    pregabalin (LYRICA) 75 MG capsule Take 1 capsule by mouth 3 times daily for 30 days. 90 capsule 0    atorvastatin (LIPITOR) 40 MG tablet Take 1 tablet by mouth nightly 30 tablet 3    carvedilol (COREG) 3.125 MG tablet Take 1 tablet by mouth 2 times daily 60 tablet 3    albuterol sulfate HFA (VENTOLIN HFA) 108 (90 Base) MCG/ACT inhaler Inhale 2 puffs into the lungs every 4 hours as needed for Wheezing 1 Inhaler 3    Calcium Alginate (ALGICELL CALCIUM DRESSING 4\"X8) MISC Apply 1 Device topically daily 30 each 3    PROMOGRAN COREY WOUND DRESSING MATRIX Apply topically Apply to left daily and cover with gauze 1 Package 3    clopidogrel (PLAVIX) 75 MG tablet Take 1 tablet by mouth daily 30 tablet 11    oxyCODONE (ROXICODONE) 5 MG immediate release tablet Take 1 tablet by mouth every 6 hours as needed for Pain for up to 11 days. Intended supply:11 days. Take lowest dose possible to manage pain 44 tablet 0     No current facility-administered medications for this visit.      Facility-Administered Medications Ordered in Other Visits   Medication Dose Route Frequency Provider Last Rate Last Admin    lidocaine (XYLOCAINE) 5 % ointment   Topical Once Doris Pierre APRN - CNP          Allergies   Allergen Reactions    Pcn [Penicillins] Hives    Fentanyl Itching       Review of Systems:         Review of Systems Musculoskeletal: Positive for gait problem and joint swelling. Skin: Positive for wound. OBJECTIVE:  Physical Exam:    /60   Pulse 61   Ht 6' (1.829 m)   Wt 260 lb (117.9 kg)   SpO2 97%   BMI 35.26 kg/m²      Physical Exam  Constitutional:       Appearance: He is well-developed. HENT:      Head: Normocephalic. Eyes:      Pupils: Pupils are equal, round, and reactive to light. Cardiovascular:      Rate and Rhythm: Normal rate. Pulmonary:      Effort: Pulmonary effort is normal.   Abdominal:      General: There is no distension. Palpations: Abdomen is soft. There is no mass. Tenderness: There is no abdominal tenderness. There is no guarding or rebound. Musculoskeletal:         General: Normal range of motion. Cervical back: Normal range of motion and neck supple. Feet:    Skin:     General: Skin is warm. Neurological:      Mental Status: He is alert and oriented to person, place, and time. ASSESSMENT:  1. Toe gangrene ([address]          PLAN:  Treatment: We will proceed with left second toe amputation under MAC. Patient counseled on risks, benefits, and alternatives of treatment plan at length today. Patient states an understanding and willingness to proceed with plan. No orders of the defined types were placed in this encounter. No orders of the defined types were placed in this encounter. Follow Up:  No follow-ups on file.       Jono Luna MD

## 2021-06-30 ENCOUNTER — OFFICE VISIT (OUTPATIENT)
Dept: CARDIOLOGY CLINIC | Age: 71
End: 2021-06-30
Payer: MEDICARE

## 2021-06-30 VITALS
HEIGHT: 72 IN | SYSTOLIC BLOOD PRESSURE: 142 MMHG | HEART RATE: 60 BPM | BODY MASS INDEX: 35.21 KG/M2 | DIASTOLIC BLOOD PRESSURE: 76 MMHG | WEIGHT: 260 LBS

## 2021-06-30 DIAGNOSIS — I10 ESSENTIAL HYPERTENSION: ICD-10-CM

## 2021-06-30 DIAGNOSIS — I25.10 CHRONIC CORONARY ARTERY DISEASE: ICD-10-CM

## 2021-06-30 DIAGNOSIS — I73.9 PAD (PERIPHERAL ARTERY DISEASE) (HCC): Primary | ICD-10-CM

## 2021-06-30 DIAGNOSIS — E78.2 MIXED HYPERLIPIDEMIA: ICD-10-CM

## 2021-06-30 DIAGNOSIS — Z95.810 BIVENTRICULAR ICD (IMPLANTABLE CARDIOVERTER-DEFIBRILLATOR) IN PLACE: ICD-10-CM

## 2021-06-30 PROBLEM — I16.9 HYPERTENSIVE CRISIS: Status: RESOLVED | Noted: 2019-03-11 | Resolved: 2021-06-30

## 2021-06-30 PROBLEM — I16.1 HYPERTENSIVE EMERGENCY: Status: RESOLVED | Noted: 2018-07-17 | Resolved: 2021-06-30

## 2021-06-30 PROBLEM — E11.9 DM (DIABETES MELLITUS) (HCC): Status: RESOLVED | Noted: 2017-10-06 | Resolved: 2021-06-30

## 2021-06-30 PROCEDURE — 4040F PNEUMOC VAC/ADMIN/RCVD: CPT | Performed by: NURSE PRACTITIONER

## 2021-06-30 PROCEDURE — 3017F COLORECTAL CA SCREEN DOC REV: CPT | Performed by: NURSE PRACTITIONER

## 2021-06-30 PROCEDURE — G8417 CALC BMI ABV UP PARAM F/U: HCPCS | Performed by: NURSE PRACTITIONER

## 2021-06-30 PROCEDURE — 1036F TOBACCO NON-USER: CPT | Performed by: NURSE PRACTITIONER

## 2021-06-30 PROCEDURE — 1123F ACP DISCUSS/DSCN MKR DOCD: CPT | Performed by: NURSE PRACTITIONER

## 2021-06-30 PROCEDURE — 1111F DSCHRG MED/CURRENT MED MERGE: CPT | Performed by: NURSE PRACTITIONER

## 2021-06-30 PROCEDURE — 99214 OFFICE O/P EST MOD 30 MIN: CPT | Performed by: NURSE PRACTITIONER

## 2021-06-30 PROCEDURE — G8427 DOCREV CUR MEDS BY ELIG CLIN: HCPCS | Performed by: NURSE PRACTITIONER

## 2021-06-30 RX ORDER — DULAGLUTIDE 0.75 MG/.5ML
INJECTION, SOLUTION SUBCUTANEOUS
COMMUNITY
Start: 2021-06-23 | End: 2021-06-30

## 2021-06-30 RX ORDER — TIOTROPIUM BROMIDE 18 UG/1
CAPSULE ORAL; RESPIRATORY (INHALATION)
COMMUNITY
Start: 2021-06-18

## 2021-06-30 RX ORDER — INSULIN LISPRO 100 [IU]/ML
INJECTION, SOLUTION INTRAVENOUS; SUBCUTANEOUS 2 TIMES DAILY
Status: ON HOLD | COMMUNITY
End: 2022-10-24 | Stop reason: HOSPADM

## 2021-06-30 RX ORDER — LOSARTAN POTASSIUM 100 MG/1
TABLET ORAL
Status: ON HOLD | COMMUNITY
Start: 2021-05-29 | End: 2021-10-30 | Stop reason: HOSPADM

## 2021-06-30 RX ORDER — INSULIN GLARGINE 100 [IU]/ML
INJECTION, SOLUTION SUBCUTANEOUS
COMMUNITY
Start: 2021-04-15 | End: 2021-06-30 | Stop reason: ALTCHOICE

## 2021-06-30 NOTE — ASSESSMENT & PLAN NOTE
-At or near goal Yes   -He is to continue current medications (Lipitor 40 mg) Hepatic function panel WNL. No abdominal pain. No myalgias.     -The nature of cardiac risk has been fully discussed with this patient. I have made him aware of his LDL target goal given his cardiovascular risk analysis. I have discussed the appropriate diet. The need for lifelong compliance in order to reduce risk is stressed. A regular exercise program is recommended to help achieve and maintain normal body weight, fitness and improve lipid balance. A written copy of a low fat, low cholesterol diet has been given to the patient.

## 2021-06-30 NOTE — PROGRESS NOTES
[facility] PHYSICAL REHABILITATION [facility] [address], [address],Third [address]  Phone: [phone]    Fax [phone]                  Candace Keen MD, Stephanie Giles MD, Chriss Gutierres MD, MD Patsy Barr MD Gwynneth Elk, MD Bea Pencil, APRMARLENE Dale, APRMARLENE Dsouza [facility], [facility], [facility]        Cardiology Progress Note      6/30/2021    RE: Cinthia Love  (1950)                             Primary cardiologist: Dr. Patsy Herrera       Subjective:  CC:   1. PAD (peripheral artery disease) ([facility]    2. Essential hypertension    3. Chronic coronary artery disease    4. Mixed hyperlipidemia    5. Biventricular ICD (implantable cardioverter-defibrillator) in place        HPI: Cinthia Love, who is a  79y.o. year old male with a past medical history as listed below. Patient presents to the office for follow up on CAD, HTN, PAD, BIVICD, and hyperlipidemia. Patient was recently hospitalized for diabetic foot ulcer underwent angiogram.  Patient also had syncopal event during hospitalization thought to be autonomic dysfunction. Patient denies any chest pain, shortness of breath, dizziness, syncope, or palpitations.     Past Medical History:   Diagnosis Date    Acid reflux     Acute MI ([address] 2004, 2008    Arthritis     Back    Broken teeth     Upper Front    CAD (coronary artery disease)     Sees Dr. Saman Neri [facility])     per old chart    CHF (congestive heart failure) ([address]     Chronic back pain     Chronic kidney disease     STAGE 3 KIDNEY FAILURE- \"from my diabetes- do not follow with any one- have seen Dr Genesis Giron in the past\"    Diabetes mellitus ([address] Dx 1965    per old chart pt has been diabetic since age 13    Diabetic neuropathy ([address]     \"in my feet\"    H/O cardiovascular stress test 08/25/2016    H/O Doppler ultrasound 09/27/2018    Moderate disease of the right lower extremity with an JALEN of  PROMOGRAN COREY WOUND DRESSING MATRIX Apply topically Apply to left daily and cover with gauze 1 Package 3    clopidogrel (PLAVIX) 75 MG tablet Take 1 tablet by mouth daily 30 tablet 11     No current facility-administered medications for this visit. Review of Systems:  Review of Systems   Cardiovascular: Positive for leg swelling. Negative for chest pain and palpitations. Musculoskeletal: Positive for gait problem. Skin: Negative. Neurological: Negative for dizziness and weakness. All other systems reviewed and are negative. Objective:      Physical Exam:  BP (!) 142/76   Pulse 60   Ht 6' (1.829 m)   Wt 260 lb (117.9 kg)   BMI 35.26 kg/m²   Wt Readings from Last 3 Encounters:   06/30/21 260 lb (117.9 kg)   06/24/21 260 lb (117.9 kg)   06/17/21 260 lb (117.9 kg)     Body mass index is 35.26 kg/m². Physical exam:  Physical Exam  Constitutional:       Appearance: He is well-developed. Cardiovascular:      Rate and Rhythm: Normal rate and regular rhythm. Pulses: Intact distal pulses. Dorsalis pedis pulses are 2+ on the right side and 2+ on the left side. Posterior tibial pulses are 2+ on the right side and 2+ on the left side. Heart sounds: Normal heart sounds, S1 normal and S2 normal.   Pulmonary:      Effort: Pulmonary effort is normal.      Breath sounds: Normal breath sounds. Musculoskeletal:         General: Normal range of motion. Right lower leg: Edema present. Left lower leg: Edema present. Skin:     General: Skin is warm and dry. Neurological:      Mental Status: He is alert and oriented to person, place, and time.           DATA:  Lab Results   Component Value Date    CKTOTAL 120 06/07/2021    CKMB 12.7 07/22/2012    TROPONINI 0.018 04/28/2014    TROPONINI 0.027 09/08/2011     BNP:    Lab Results   Component Value Date    BNP 67 03/27/2014     PT/INR:  No results found for: PTINR  Lab Results   Component Value Date    LABA1C 7.9 (H) 06/07/2021    LABA1C 6.6 (H) 12/10/2020     Lab Results   Component Value Date    CHOL 79 12/11/2020    TRIG 61 12/11/2020    HDL 30 (L) 12/11/2020    LDLCALC 70 01/25/2016    LDLDIRECT 44 12/11/2020     Lab Results   Component Value Date    ALT <5 (L) 06/11/2021    AST 10 (L) 06/11/2021     TSH:  No results found for: TSH    Vitals:    06/30/21 1432   BP: (!) 142/76   Pulse: 60       Echo:6/11/21   Left ventricular systolic function is low normal.   Ejection fraction is visually estimated at 45-50%. Severely dilated left atrium. Severely dilated right ventricle. PPM wiring visualized within the right heart. Severe tricuspid regurgitation; RVSP: 68 mmHg. No evidence of any pericardial effusion. Stress Test:8/13/20  No ischemia    Cath:10/20/18  PATENT STENTS OF ALL THREE MAJOR VESSELS      The ASCVD Risk score (Patty Joshua., et al., 2013) failed to calculate for the following reasons: The valid total cholesterol range is 130 to 320 mg/dL      Assessment/ Plan:     Mixed hyperlipidemia   -At or near goal Yes   -He is to continue current medications (Lipitor 40 mg) Hepatic function panel WNL. No abdominal pain. No myalgias.     -The nature of cardiac risk has been fully discussed with this patient. I have made him aware of his LDL target goal given his cardiovascular risk analysis. I have discussed the appropriate diet. The need for lifelong compliance in order to reduce risk is stressed. A regular exercise program is recommended to help achieve and maintain normal body weight, fitness and improve lipid balance. A written copy of a low fat, low cholesterol diet has been given to the patient. Biventricular ICD (implantable cardioverter-defibrillator) in place  Per Dr. Gely Moody: ICD analysis is reviewed and is consistent with normal dual-chamber MRI safe Medtronic ICD function with stable leads and appropriate battery status for the age of the device. No therapies detected.  Programming parameters are also reviewed for optimal settings for this device in this patient. Device is  programmed to DDD mode lower rate of 60 bpm and 78% sensing in both atrium and ventricle and 22% pacing in the atrium and sensing in the ventricle.  Opti Vol fluid index suggest labile CHF status. Remaining device longevity is 4.4 years. Chronic coronary artery disease   -Multiple stents, circumflex complex and OM done [facility] in 2010. Patient has had stents placed in 2000, 2004 and 2005. Left heart cath in 2018 showed patent stents stress test and February 2020 did not show any ischemia. Primary and secondary prevention discussed with patient:   -Low sodium cardiac diet   -exercise 30 min a day three days a week  Continue current medications Norvasc, Lipitor, Coreg, Plavix         HTN (hypertension)   -Stable, continue with losartan 100 mg daily, Coreg 3.125 mg twice daily, and Norvasc 10 mg daily. PAD (peripheral artery disease) (HCC)   -Moderate stenosis when in the proximal SFA and femoral artery on the right leg. Three-vessel runoff noted to the right foot no severe stenosis seen within the left lower extremity patient is planning angiogram as outpatient with cardiovascular surgery. Would like optimal renal function. Patient receiving IV antibiotics for multiple wound infections on feet. Patient seen, interviewed and examined. Testing was reviewed. Patient is encouraged to exercise even a brisk walk for 30 minutes at least 3 to 4 times a week. Lifestyle and risk factor modificatons discussed. Various goals are discussed and questions answered. Continue current medications. Appropriate prescriptions are addressed. Questions answered and patient verbalizes understanding. Call for any problems, questions, or concerns. Pt is to follow up in 2 months for Cardiac management    Electronically signed by MICKY Terry CNP on 6/30/2021 at 3:07 PM

## 2021-06-30 NOTE — ASSESSMENT & PLAN NOTE
-Multiple stents, circumflex complex and OM done at [facility] in 2010. Patient has had stents placed in 2000, 2004 and 2005. Left heart cath in 2018 showed patent stents stress test and February 2020 did not show any ischemia.     Primary and secondary prevention discussed with patient:   -Low sodium cardiac diet   -exercise 30 min a day three days a week  Continue current medications Norvasc, Lipitor, Coreg, Plavix

## 2021-06-30 NOTE — ASSESSMENT & PLAN NOTE
-Moderate stenosis when in the proximal SFA and femoral artery on the right leg. Three-vessel runoff noted to the right foot no severe stenosis seen within the left lower extremity patient is planning angiogram as outpatient with cardiovascular surgery. Would like optimal renal function. Patient receiving IV antibiotics for multiple wound infections on feet.

## 2021-07-01 ENCOUNTER — HOSPITAL ENCOUNTER (OUTPATIENT)
Age: 71
Setting detail: SPECIMEN
Discharge: HOME | End: 2021-07-01
Payer: MEDICARE

## 2021-07-01 ENCOUNTER — OFFICE VISIT (OUTPATIENT)
Dept: SURGERY | Age: 71
End: 2021-07-01
Payer: MEDICARE

## 2021-07-01 VITALS
WEIGHT: 265 LBS | SYSTOLIC BLOOD PRESSURE: 136 MMHG | DIASTOLIC BLOOD PRESSURE: 80 MMHG | HEART RATE: 65 BPM | OXYGEN SATURATION: 97 % | HEIGHT: 72 IN | TEMPERATURE: 97.5 F | BODY MASS INDEX: 35.89 KG/M2

## 2021-07-01 DIAGNOSIS — S98.132A AMPUTATED TOE, LEFT (HCC): Primary | ICD-10-CM

## 2021-07-01 DIAGNOSIS — I96 WET GANGRENE (HCC): ICD-10-CM

## 2021-07-01 LAB
ALBUMIN SERPL-MCNC: 3.6 GM/DL (ref 3.4–5)
ALP BLD-CCNC: 111 IU/L (ref 40–129)
ALT SERPL-CCNC: 15 U/L (ref 10–40)
ANION GAP SERPL CALCULATED.3IONS-SCNC: 7 MMOL/L (ref 4–16)
AST SERPL-CCNC: 16 IU/L (ref 15–37)
BILIRUB SERPL-MCNC: 0.3 MG/DL (ref 0–1)
BUN BLDV-MCNC: 17 MG/DL (ref 6–23)
CALCIUM SERPL-MCNC: 8.5 MG/DL (ref 8.3–10.6)
CHLORIDE BLD-SCNC: 106 MMOL/L (ref 99–110)
CO2: 25 MMOL/L (ref 21–32)
CREAT SERPL-MCNC: 1.4 MG/DL (ref 0.9–1.3)
DOSE AMOUNT: NORMAL
DOSE TIME: NORMAL
GFR AFRICAN AMERICAN: >60 ML/MIN/1.73M2
GFR NON-AFRICAN AMERICAN: 50 ML/MIN/1.73M2
GLUCOSE BLD-MCNC: 187 MG/DL (ref 70–99)
POTASSIUM SERPL-SCNC: 5.3 MMOL/L (ref 3.5–5.1)
SODIUM BLD-SCNC: 138 MMOL/L (ref 135–145)
TOTAL PROTEIN: 6.4 GM/DL (ref 6.4–8.2)
VANCOMYCIN RANDOM: 24.3 UG/ML

## 2021-07-01 PROCEDURE — 3017F COLORECTAL CA SCREEN DOC REV: CPT | Performed by: SURGERY

## 2021-07-01 PROCEDURE — 1036F TOBACCO NON-USER: CPT | Performed by: SURGERY

## 2021-07-01 PROCEDURE — 99213 OFFICE O/P EST LOW 20 MIN: CPT | Performed by: SURGERY

## 2021-07-01 PROCEDURE — 1111F DSCHRG MED/CURRENT MED MERGE: CPT | Performed by: SURGERY

## 2021-07-01 PROCEDURE — 80053 COMPREHEN METABOLIC PANEL: CPT

## 2021-07-01 PROCEDURE — 1123F ACP DISCUSS/DSCN MKR DOCD: CPT | Performed by: SURGERY

## 2021-07-01 PROCEDURE — G8417 CALC BMI ABV UP PARAM F/U: HCPCS | Performed by: SURGERY

## 2021-07-01 PROCEDURE — 80202 ASSAY OF VANCOMYCIN: CPT

## 2021-07-01 PROCEDURE — G8427 DOCREV CUR MEDS BY ELIG CLIN: HCPCS | Performed by: SURGERY

## 2021-07-01 PROCEDURE — 4040F PNEUMOC VAC/ADMIN/RCVD: CPT | Performed by: SURGERY

## 2021-07-01 RX ORDER — GABAPENTIN 300 MG/1
300 CAPSULE ORAL 3 TIMES DAILY
Qty: 90 CAPSULE | Refills: 3 | Status: SHIPPED | OUTPATIENT
Start: 2021-07-01 | End: 2021-11-15 | Stop reason: SDUPTHER

## 2021-07-01 RX ORDER — HYDROCODONE BITARTRATE AND ACETAMINOPHEN 5; 325 MG/1; MG/1
1 TABLET ORAL EVERY 4 HOURS PRN
Qty: 30 TABLET | Refills: 0 | Status: SHIPPED | OUTPATIENT
Start: 2021-07-01 | End: 2021-07-08

## 2021-07-01 NOTE — PROGRESS NOTES
Chief Complaint   Patient presents with    Follow-up     1 wk f/i asa feet wounds         SUBJECTIVE:  HPI: Patient is here with complaints of bilateral toe wounds. He recently underwent amputations and redness of his several toes. The left second toe appears to be necrotic. Patient denies any fever or chills. Is wheelchair-bound. Patient has difficulty with mobility and as a result of generalized weakness. He has also severe bilateral lower extremity edema as well as neuropathy as a result of diabetes. I have reviewed the patient's(pertinent information to this visit) medical history, family history(scanned in  the [address] [place] [address] under \"patient questioner\"), social history and review of systems with the patient today in the office.             Past Surgical History:   Procedure Laterality Date    CARDIAC CATHETERIZATION      per old chart done 10/2014    CARDIAC CATHETERIZATION  07/14/2017    with angiography of leg    CARDIAC CATHETERIZATION  11/20/2018    PATENT STENTS OF ALL THREE MAJOR VESSELS    CARDIAC DEFIBRILLATOR PLACEMENT  06/04/2010    Medtronic [address] DR Defibrillator Implanted    COLECTOMY Right 08/26/2016    laparascopic; robotic assisted    COLONOSCOPY  08/04/2016    CORONARY ANGIOPLASTY      \"15 Heart Stents\"    CORONARY ANGIOPLASTY WITH STENT PLACEMENT      per old chart had angio with stent to circumflex and obtuse marginal artery at [facility] 5/2010( old chart also gives hx of stent placement done 2000,2004 and 2005)    DENTAL SURGERY      Teeth Extracted In Past    IR TUNNELED CATHETER PLACEMENT GREATER THAN 5 YEARS  6/14/2021    IR TUNNELED CATHETER PLACEMENT GREATER THAN 5 YEARS 6/14/2021 [facility]    PACEMAKER PLACEMENT  06/04/2010    Medtronic Secura DR Defibrillator Implanted    TOE AMPUTATION Right 09/12/2017    Rt 3rd toe    TOE AMPUTATION Right 01/09/2018     Right 5th toe amputation and Toenails trimmed left 2,3,4 and 5th toes    TOE AMPUTATION Left 12/26/2020 LEFT GREAT TOE AMPUTATION performed by Italia Thorne MD at [facility]      per old chart had balloon angioplasty right superfical femoral artery,right popliteal artery,,right ant.tibial artery, right tibioperoneal trunk, and right post.tibial artery wna stent placement right popliteal artery and superfical femoral artery 7/2012     Past Medical History:   Diagnosis Date    Acid reflux     Acute MI ([facility] [address] 2004, 2008    Arthritis     Back    Broken teeth     Upper Front    CAD (coronary artery disease)     Sees Dr. [facility]a [facility])     per old chart    CHF (congestive heart failure) ([facility])     Chronic back pain     Chronic kidney disease     STAGE 3 KIDNEY FAILURE- \"from my diabetes- do not follow with any one- have seen Dr Hannah Ortiz in the past\"    Diabetes mellitus ([facility] Dx 1965    per old chart pt has been diabetic since age 13    Diabetic neuropathy ([facility]     \"in my feet\"    H/O cardiovascular stress test 08/25/2016    H/O Doppler ultrasound 09/27/2018    Moderate disease of the right lower extremity with an JALEN of 0.72.   Moderate to severe disease of the left lower extremity with an JALEN of 0.55.   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    Neuropathy     both feet    neuropathy     PAD (peripheral artery disease) ([facility] 09/27/2018    PVD (peripheral vascular disease) ([facility]     Sick sinus syndrome ([facility]     Sleep apnea     \"sleep study 3 yrs ago- could not tolerate the cpap made me too dry\"    Spinal stenosis     Teeth missing     Upper And Lower    Type 2 diabetes mellitus without complication ([facility]      Family History   Problem Relation Age of Onset    Diabetes Mother     Stroke Mother     High Blood Pressure Mother     Vision Loss Mother     Cancer Father         Prostate Cancer    Diabetes Sister     Neuropathy Sister     Other Sister         \"Breathing Problems\"    Heart Disease Sister     Early Death Sister 62        Heart Complications    Cancer Brother         \"Stomach Cancer\"    High Blood Pressure Brother     Diabetes Brother     Heart Disease Brother     High Blood Pressure Brother     Cancer Son         \"Testicle Cancer\"     Social History     Socioeconomic History    Marital status:      Spouse name: Not on file    Number of children: Not on file    Years of education: Not on file    Highest education level: Not on file   Occupational History    Not on file   Tobacco Use    Smoking status: Former Smoker     Packs/day: 0.10     Years: 36.00     Pack years: 3.60     Types: Cigars     Start date: 1/1/1980    Smokeless tobacco: Never Used    Tobacco comment: Client states he has stopped smoking   Vaping Use    Vaping Use: Never used   Substance and Sexual Activity    Alcohol use: No    Drug use: Yes     Types: Marijuana    Sexual activity: Never   Other Topics Concern    Not on file   Social History Narrative    Not on file     Social Determinants of Health     Financial Resource Strain:     Difficulty of Paying Living Expenses:    Food Insecurity:     Worried About Running Out of Food in the Last Year:     [address] in the Last Year:    Transportation Needs:     Lack of Transportation (Medical):      Lack of Transportation (Non-Medical):    Physical Activity:     Days of Exercise per Week:     Minutes of Exercise per Session:    Stress:     Feeling of Stress :    Social Connections:     Frequency of Communication with Friends and Family:     Frequency of Social Gatherings with Friends and Family:     Attends Temple Services:     Active Member of Clubs or Organizations:     Attends Club or Organization Meetings:     Marital Status:    Intimate Partner Violence:     Fear of Current or Ex-Partner:     Emotionally Abused:     Physically Abused:     Sexually Abused:        Current Outpatient Medications   Medication Sig Dispense Refill    losartan (COZAAR) 100 MG tablet       SPIRIVA HANDIHALER 18 MCG inhalation capsule       insulin lispro, 1 Unit Dial, (HUMALOG KWIKPEN) 100 UNIT/ML SOPN Inject into the skin 2 times daily      oxyCODONE (ROXICODONE) 5 MG immediate release tablet Take 1 tablet by mouth every 6 hours as needed for Pain for up to 11 days. Intended supply:11 days. Take lowest dose possible to manage pain 44 tablet 0    chlorthalidone (HYGROTEN) 50 MG tablet Take 1 tablet by mouth daily 30 tablet 0    amLODIPine (NORVASC) 10 MG tablet Take 1 tablet by mouth daily 30 tablet 0    atorvastatin (LIPITOR) 40 MG tablet Take 1 tablet by mouth nightly 30 tablet 3    carvedilol (COREG) 3.125 MG tablet Take 1 tablet by mouth 2 times daily 60 tablet 3    albuterol sulfate HFA (VENTOLIN HFA) 108 (90 Base) MCG/ACT inhaler Inhale 2 puffs into the lungs every 4 hours as needed for Wheezing 1 Inhaler 3    Calcium Alginate (ALGICELL CALCIUM DRESSING 4\"X8) MISC Apply 1 Device topically daily 30 each 3    PROMOGRAN COREY WOUND DRESSING MATRIX Apply topically Apply to left daily and cover with gauze 1 Package 3    clopidogrel (PLAVIX) 75 MG tablet Take 1 tablet by mouth daily 30 tablet 11     No current facility-administered medications for this visit. Allergies   Allergen Reactions    Pcn [Penicillins] Hives    Fentanyl Itching       Review of Systems:         Review of Systems   Musculoskeletal: Positive for gait problem and joint swelling. Skin: Positive for wound.          OBJECTIVE:  Physical Exam:    /80   Pulse 65   Temp 97.5 °F (36.4 °C)   Ht 6' (1.829 m)   Wt 265 lb (120.2 kg)   SpO2 97% BMI 35.94 kg/m²      Physical Exam  Constitutional:       Appearance: He is well-developed. HENT:      Head: Normocephalic. Eyes:      Pupils: Pupils are equal, round, and reactive to light. Cardiovascular:      Rate and Rhythm: Normal rate. Pulmonary:      Effort: Pulmonary effort is normal.   Abdominal:      General: There is no distension. Palpations: Abdomen is soft. There is no mass. Tenderness: There is no abdominal tenderness. There is no guarding or rebound. Musculoskeletal:         General: Normal range of motion. Cervical back: Normal range of motion and neck supple. Feet:    Skin:     General: Skin is warm. Neurological:      Mental Status: He is alert and oriented to person, place, and time. ASSESSMENT:  1. Amputated toe, left (HCC)          PLAN:  Treatment: pt in sever pain and will add gabapentin as well as plan to proceed with left second toe amputation under MAC in the near future. I will discuss care with Dr Shikha Kuo for possible intervention prior to amputation. Patient counseled on risks, benefits, and alternatives of treatment plan at length today. Patient states an understanding and willingness to proceed with plan. No orders of the defined types were placed in this encounter. No orders of the defined types were placed in this encounter. Follow Up:  No follow-ups on file.       Alphonso Curran MD

## 2021-07-05 ENCOUNTER — HOSPITAL ENCOUNTER (OUTPATIENT)
Age: 71
Setting detail: SPECIMEN
Discharge: HOME | End: 2021-07-05
Payer: MEDICARE

## 2021-07-05 LAB
ALBUMIN SERPL-MCNC: 3.2 GM/DL (ref 3.4–5)
ALP BLD-CCNC: 105 IU/L (ref 40–128)
ALT SERPL-CCNC: 12 U/L (ref 10–40)
ANION GAP SERPL CALCULATED.3IONS-SCNC: 9 MMOL/L (ref 4–16)
AST SERPL-CCNC: 15 IU/L (ref 15–37)
BASOPHILS ABSOLUTE: 0 K/CU MM
BASOPHILS RELATIVE PERCENT: 0.4 % (ref 0–1)
BILIRUB SERPL-MCNC: 0.2 MG/DL (ref 0–1)
BUN BLDV-MCNC: 18 MG/DL (ref 6–23)
C-REACTIVE PROTEIN, HIGH SENSITIVITY: 16.3 MG/L
CALCIUM SERPL-MCNC: 8.5 MG/DL (ref 8.3–10.6)
CHLORIDE BLD-SCNC: 105 MMOL/L (ref 99–110)
CO2: 25 MMOL/L (ref 21–32)
CREAT SERPL-MCNC: 1.5 MG/DL (ref 0.9–1.3)
DIFFERENTIAL TYPE: ABNORMAL
DOSE AMOUNT: ABNORMAL
DOSE TIME: ABNORMAL
EOSINOPHILS ABSOLUTE: 0.1 K/CU MM
EOSINOPHILS RELATIVE PERCENT: 2 % (ref 0–3)
ERYTHROCYTE SEDIMENTATION RATE: 66 MM/HR (ref 0–20)
GFR AFRICAN AMERICAN: 56 ML/MIN/1.73M2
GFR NON-AFRICAN AMERICAN: 46 ML/MIN/1.73M2
GLUCOSE BLD-MCNC: 262 MG/DL (ref 70–99)
HCT VFR BLD CALC: 29.4 % (ref 42–52)
HEMOGLOBIN: 8.8 GM/DL (ref 13.5–18)
IMMATURE NEUTROPHIL %: 0.4 % (ref 0–0.43)
LYMPHOCYTES ABSOLUTE: 1.1 K/CU MM
LYMPHOCYTES RELATIVE PERCENT: 19.3 % (ref 24–44)
MCH RBC QN AUTO: 27.7 PG (ref 27–31)
MCHC RBC AUTO-ENTMCNC: 29.9 % (ref 32–36)
MCV RBC AUTO: 92.5 FL (ref 78–100)
MONOCYTES ABSOLUTE: 0.3 K/CU MM
MONOCYTES RELATIVE PERCENT: 6 % (ref 0–4)
NUCLEATED RBC %: 0 %
PDW BLD-RTO: 15.7 % (ref 11.7–14.9)
PLATELET # BLD: 210 K/CU MM (ref 140–440)
PMV BLD AUTO: 10.2 FL (ref 7.5–11.1)
POTASSIUM SERPL-SCNC: 5.1 MMOL/L (ref 3.5–5.1)
RBC # BLD: 3.18 M/CU MM (ref 4.6–6.2)
SEGMENTED NEUTROPHILS ABSOLUTE COUNT: 4 K/CU MM
SEGMENTED NEUTROPHILS RELATIVE PERCENT: 71.9 % (ref 36–66)
SODIUM BLD-SCNC: 139 MMOL/L (ref 135–145)
TOTAL IMMATURE NEUTOROPHIL: 0.02 K/CU MM
TOTAL NUCLEATED RBC: 0 K/CU MM
TOTAL PROTEIN: 6.4 GM/DL (ref 6.4–8.2)
VANCOMYCIN TROUGH: 6.3 UG/ML (ref 10–20)
WBC # BLD: 5.5 K/CU MM (ref 4–10.5)

## 2021-07-05 PROCEDURE — 85025 COMPLETE CBC W/AUTO DIFF WBC: CPT

## 2021-07-05 PROCEDURE — 86140 C-REACTIVE PROTEIN: CPT

## 2021-07-05 PROCEDURE — 80053 COMPREHEN METABOLIC PANEL: CPT

## 2021-07-05 PROCEDURE — 85652 RBC SED RATE AUTOMATED: CPT

## 2021-07-05 PROCEDURE — 80202 ASSAY OF VANCOMYCIN: CPT

## 2021-07-08 ENCOUNTER — OFFICE VISIT (OUTPATIENT)
Dept: SURGERY | Age: 71
End: 2021-07-08
Payer: MEDICARE

## 2021-07-08 ENCOUNTER — HOSPITAL ENCOUNTER (OUTPATIENT)
Age: 71
Setting detail: SPECIMEN
Discharge: HOME | End: 2021-07-08
Payer: MEDICARE

## 2021-07-08 VITALS
WEIGHT: 265 LBS | BODY MASS INDEX: 35.94 KG/M2 | HEART RATE: 82 BPM | OXYGEN SATURATION: 100 % | SYSTOLIC BLOOD PRESSURE: 120 MMHG | DIASTOLIC BLOOD PRESSURE: 80 MMHG

## 2021-07-08 DIAGNOSIS — I96 TOE GANGRENE (HCC): ICD-10-CM

## 2021-07-08 DIAGNOSIS — S98.132A AMPUTATED TOE, LEFT (HCC): Primary | ICD-10-CM

## 2021-07-08 LAB
ALBUMIN SERPL-MCNC: 3.3 GM/DL (ref 3.4–5)
ALP BLD-CCNC: 106 IU/L (ref 40–128)
ALT SERPL-CCNC: 10 U/L (ref 10–40)
ANION GAP SERPL CALCULATED.3IONS-SCNC: 8 MMOL/L (ref 4–16)
AST SERPL-CCNC: 11 IU/L (ref 15–37)
BASOPHILS ABSOLUTE: 0 K/CU MM
BASOPHILS RELATIVE PERCENT: 0.3 % (ref 0–1)
BILIRUB SERPL-MCNC: 0.3 MG/DL (ref 0–1)
BUN BLDV-MCNC: 14 MG/DL (ref 6–23)
C-REACTIVE PROTEIN, HIGH SENSITIVITY: 19.3 MG/L
CALCIUM SERPL-MCNC: 8.8 MG/DL (ref 8.3–10.6)
CHLORIDE BLD-SCNC: 103 MMOL/L (ref 99–110)
CO2: 28 MMOL/L (ref 21–32)
CREAT SERPL-MCNC: 1.3 MG/DL (ref 0.9–1.3)
DIFFERENTIAL TYPE: ABNORMAL
DOSE AMOUNT: ABNORMAL
DOSE TIME: ABNORMAL
EOSINOPHILS ABSOLUTE: 0.2 K/CU MM
EOSINOPHILS RELATIVE PERCENT: 2.4 % (ref 0–3)
GFR AFRICAN AMERICAN: >60 ML/MIN/1.73M2
GFR NON-AFRICAN AMERICAN: 55 ML/MIN/1.73M2
GLUCOSE BLD-MCNC: 181 MG/DL (ref 70–99)
HCT VFR BLD CALC: 30.3 % (ref 42–52)
HEMOGLOBIN: 9.4 GM/DL (ref 13.5–18)
IMMATURE NEUTROPHIL %: 0.2 % (ref 0–0.43)
LYMPHOCYTES ABSOLUTE: 1 K/CU MM
LYMPHOCYTES RELATIVE PERCENT: 16.2 % (ref 24–44)
MCH RBC QN AUTO: 28.4 PG (ref 27–31)
MCHC RBC AUTO-ENTMCNC: 31 % (ref 32–36)
MCV RBC AUTO: 91.5 FL (ref 78–100)
MONOCYTES ABSOLUTE: 0.3 K/CU MM
MONOCYTES RELATIVE PERCENT: 5.1 % (ref 0–4)
NUCLEATED RBC %: 0 %
PDW BLD-RTO: 15.5 % (ref 11.7–14.9)
PLATELET # BLD: 191 K/CU MM (ref 140–440)
PMV BLD AUTO: 9.5 FL (ref 7.5–11.1)
POTASSIUM SERPL-SCNC: 5 MMOL/L (ref 3.5–5.1)
RBC # BLD: 3.31 M/CU MM (ref 4.6–6.2)
SEGMENTED NEUTROPHILS ABSOLUTE COUNT: 4.8 K/CU MM
SEGMENTED NEUTROPHILS RELATIVE PERCENT: 75.8 % (ref 36–66)
SODIUM BLD-SCNC: 139 MMOL/L (ref 135–145)
TOTAL IMMATURE NEUTOROPHIL: 0.01 K/CU MM
TOTAL NUCLEATED RBC: 0 K/CU MM
TOTAL PROTEIN: 6.7 GM/DL (ref 6.4–8.2)
VANCOMYCIN TROUGH: 4 UG/ML (ref 10–20)
WBC # BLD: 6.3 K/CU MM (ref 4–10.5)

## 2021-07-08 PROCEDURE — 99212 OFFICE O/P EST SF 10 MIN: CPT | Performed by: PHYSICIAN ASSISTANT

## 2021-07-08 PROCEDURE — 1123F ACP DISCUSS/DSCN MKR DOCD: CPT | Performed by: PHYSICIAN ASSISTANT

## 2021-07-08 PROCEDURE — 1036F TOBACCO NON-USER: CPT | Performed by: PHYSICIAN ASSISTANT

## 2021-07-08 PROCEDURE — 80202 ASSAY OF VANCOMYCIN: CPT

## 2021-07-08 PROCEDURE — 1111F DSCHRG MED/CURRENT MED MERGE: CPT | Performed by: PHYSICIAN ASSISTANT

## 2021-07-08 PROCEDURE — 4040F PNEUMOC VAC/ADMIN/RCVD: CPT | Performed by: PHYSICIAN ASSISTANT

## 2021-07-08 PROCEDURE — 80053 COMPREHEN METABOLIC PANEL: CPT

## 2021-07-08 PROCEDURE — 85652 RBC SED RATE AUTOMATED: CPT

## 2021-07-08 PROCEDURE — 85025 COMPLETE CBC W/AUTO DIFF WBC: CPT

## 2021-07-08 PROCEDURE — G8427 DOCREV CUR MEDS BY ELIG CLIN: HCPCS | Performed by: PHYSICIAN ASSISTANT

## 2021-07-08 PROCEDURE — 3017F COLORECTAL CA SCREEN DOC REV: CPT | Performed by: PHYSICIAN ASSISTANT

## 2021-07-08 PROCEDURE — G8417 CALC BMI ABV UP PARAM F/U: HCPCS | Performed by: PHYSICIAN ASSISTANT

## 2021-07-08 PROCEDURE — 86140 C-REACTIVE PROTEIN: CPT

## 2021-07-08 NOTE — PROGRESS NOTES
Chief Complaint   Patient presents with    Follow-up     bi lat foot wounds         SUBJECTIVE:  HPI: Patient presents today with complaints of bilateral foot wounds. Pt is s/p L great toe amputation. Reports no changes to wounds of the B feet. L wounds worse than right. Pt has not had f/u with vascular surgeons yet, so is unsure of their plan. Pt denies pain 2/2 neuropathy to the bilateral feet. It was noted that he recently was started on gabapentin, which apparently has helped with his pain. I have reviewed the patient's (pertinent information to this visit) medical history, family history (scanned in  the Media tab under \"patient questioner\"), social history and review of systems with the patient today in the office.             Past Surgical History:   Procedure Laterality Date    CARDIAC CATHETERIZATION      per old chart done 10/2014    CARDIAC CATHETERIZATION  07/14/2017    with angiography of leg    CARDIAC CATHETERIZATION  11/20/2018    PATENT STENTS OF ALL THREE MAJOR VESSELS    CARDIAC DEFIBRILLATOR PLACEMENT  06/04/2010    [employer] [address] Defibrillator Implanted    COLECTOMY Right 08/26/2016    laparascopic; robotic assisted    COLONOSCOPY  08/04/2016    CORONARY ANGIOPLASTY      \"15 Heart Stents\"    CORONARY ANGIOPLASTY WITH STENT PLACEMENT      per old chart had angio with stent to circumflex and obtuse marginal artery at [facility] 5/2010( old chart also gives hx of stent placement done 2000,2004 and 2005)    DENTAL SURGERY      Teeth Extracted In Past    IR TUNNELED CATHETER PLACEMENT GREATER THAN 5 YEARS  6/14/2021    IR TUNNELED CATHETER PLACEMENT GREATER THAN 5 YEARS 6/14/2021 [facility]    PACEMAKER PLACEMENT  06/04/2010    [employer] [address] DR Defibrillator Implanted    TOE AMPUTATION Right 09/12/2017    Rt 3rd toe    TOE AMPUTATION Right 01/09/2018     Right 5th toe amputation and Toenails trimmed left 2,3,4 and 5th toes    TOE AMPUTATION Left 12/26/2020    LEFT GREAT TOE AMPUTATION performed by Jair Chandra MD at [facility]      per old chart had balloon angioplasty right superfical femoral artery,right popliteal artery,,right ant.tibial artery, right tibioperoneal trunk, and right post.tibial artery wna stent placement right popliteal artery and superfical femoral artery 7/2012     Past Medical History:   Diagnosis Date    Acid reflux     Acute MI ([facility] 2004, 2008    Arthritis     Back    Broken teeth     Upper Front    CAD (coronary artery disease)     Sees Dr. Suzie Gonzalez [facility])     per old chart    CHF (congestive heart failure) (HCC)     Chronic back pain     Chronic kidney disease     STAGE 3 KIDNEY FAILURE- \"from my diabetes- do not follow with any one- have seen Dr Brannon Garcia in the past\"    Diabetes mellitus ([facility] Dx 1965    per old chart pt has been diabetic since age 13    Diabetic neuropathy ([facility] [facility]     \"in my feet\"    H/O cardiovascular stress test 08/25/2016    H/O Doppler ultrasound 09/27/2018    Moderate disease of the right lower extremity with an JALEN of 0.72.   Moderate to severe disease of the left lower extremity with an JALEN of 0.55.   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    Neuropathy     both feet    neuropathy     PAD (peripheral artery disease) ([facility] 09/27/2018    PVD (peripheral vascular disease) ([facility] [facility]     Sick sinus syndrome ([facility] [facility]     Sleep apnea     \"sleep study 3 yrs ago- could not tolerate the cpap made me too dry\"    Spinal stenosis     Teeth missing     Upper And Lower    Type 2 diabetes mellitus without complication ([facility] [address]      Family History   Problem Relation Age of Onset    Diabetes Mother     Stroke Mother     High Blood Pressure Mother     Vision Loss Mother     Cancer Father         Prostate Cancer    Diabetes Sister     Neuropathy Sister     Other Sister         \"Breathing Problems\"    Heart Disease Sister     Early Death Sister 62        Heart Complications    Cancer Brother         \"Stomach Cancer\"    High Blood Pressure Brother     Diabetes Brother     Heart Disease Brother     High Blood Pressure Brother     Cancer Son         \"Testicle Cancer\"     Social History     Socioeconomic History    Marital status:      Spouse name: Not on file    Number of children: Not on file    Years of education: Not on file    Highest education level: Not on file   Occupational History    Not on file   Tobacco Use    Smoking status: Former Smoker     Packs/day: 0.10     Years: 36.00     Pack years: 3.60     Types: Cigars     Start date: 1/1/1980    Smokeless tobacco: Never Used    Tobacco comment: Client states he has stopped smoking   Vaping Use    Vaping Use: Never used   Substance and Sexual Activity    Alcohol use: No    Drug use: Yes     Types: Marijuana    Sexual activity: Never   Other Topics Concern    Not on file   Social History Narrative    Not on file     Social Determinants of Health     Financial Resource Strain:     Difficulty of Paying Living Expenses:    Food Insecurity:     Worried About Running Out of Food in the Last Year:     [address] in the Last Year:    Transportation Needs:     Lack of Transportation (Medical):      Lack of Transportation (Non-Medical):    Physical Activity:     Days of Exercise per Week:     Minutes of Exercise per Session:    Stress:     Feeling of Stress :    Social Connections:     Frequency of Communication with Friends and Family:     Frequency of Social Gatherings with Friends and Family:     Attends Muslim Services:  Active Member of Clubs or Organizations:     Attends Club or Organization Meetings:     Marital Status:    Intimate Partner Violence:     Fear of Current or Ex-Partner:     Emotionally Abused:     Physically Abused:     Sexually Abused:        Current Outpatient Medications   Medication Sig Dispense Refill    gabapentin (NEURONTIN) 300 MG capsule Take 1 capsule by mouth 3 times daily for 90 days. 90 capsule 3    losartan (COZAAR) 100 MG tablet       SPIRIVA HANDIHALER 18 MCG inhalation capsule       insulin lispro, 1 Unit Dial, (HUMALOG KWIKPEN) 100 UNIT/ML SOPN Inject into the skin 2 times daily      chlorthalidone (HYGROTEN) 50 MG tablet Take 1 tablet by mouth daily 30 tablet 0    amLODIPine (NORVASC) 10 MG tablet Take 1 tablet by mouth daily 30 tablet 0    atorvastatin (LIPITOR) 40 MG tablet Take 1 tablet by mouth nightly 30 tablet 3    carvedilol (COREG) 3.125 MG tablet Take 1 tablet by mouth 2 times daily 60 tablet 3    albuterol sulfate HFA (VENTOLIN HFA) 108 (90 Base) MCG/ACT inhaler Inhale 2 puffs into the lungs every 4 hours as needed for Wheezing 1 Inhaler 3    Calcium Alginate (ALGICELL CALCIUM DRESSING 4\"X8) MISC Apply 1 Device topically daily 30 each 3    PROMOGRAN COREY WOUND DRESSING MATRIX Apply topically Apply to left daily and cover with gauze 1 Package 3    clopidogrel (PLAVIX) 75 MG tablet Take 1 tablet by mouth daily 30 tablet 11     No current facility-administered medications for this visit. Allergies   Allergen Reactions    Pcn [Penicillins] Hives    Fentanyl Itching       Review of Systems:       Review of Systems   Constitutional: Negative for chills and fever. HENT: Negative for ear pain, mouth sores, sore throat and tinnitus. Eyes: Negative for photophobia, redness and itching. Respiratory: Negative for apnea, choking and stridor. Cardiovascular: Negative for chest pain and palpitations.    Gastrointestinal: Negative for anal bleeding, constipation and rectal pain. Endocrine: Negative for polydipsia. Genitourinary: Negative for enuresis, flank pain and hematuria. Musculoskeletal: Positive for gait problem. Negative for back pain, joint swelling and myalgias. Skin: Positive for wound. Negative for color change and pallor. Allergic/Immunologic: Negative for environmental allergies. Neurological: Positive for weakness. Negative for syncope and speech difficulty. Psychiatric/Behavioral: Negative for confusion and hallucinations. OBJECTIVE:  Physical Exam:    /80 (Site: Left Upper Arm, Position: Sitting, Cuff Size: Large Adult)   Pulse 82   Wt 265 lb (120.2 kg)   SpO2 100%   BMI 35.94 kg/m²      Physical Exam  Constitutional:       Appearance: He is well-developed. Comments: Wheelchair bound   HENT:      Head: Normocephalic. Eyes:      Pupils: Pupils are equal, round, and reactive to light. Cardiovascular:      Rate and Rhythm: Normal rate. Pulmonary:      Effort: Pulmonary effort is normal.   Abdominal:      General: There is no distension. Palpations: Abdomen is soft. There is no mass. Tenderness: There is no abdominal tenderness. There is no guarding or rebound. Musculoskeletal:         General: Normal range of motion. Cervical back: Normal range of motion and neck supple. Feet:    Feet:      Comments: S/p L great toe amputation. L 2nd toe with necrosis. Skin:     General: Skin is warm. Neurological:      Mental Status: He is alert and oriented to person, place, and time. ASSESSMENT:  1. Amputated toe, left ([address]    2. Toe gangrene ([address]          PLAN:  Treatment:  Continue local wound care. Pt is to see vascular surgeons. Pt will likely need L 2nd toe amputation in the near future, but will see what vascular surgery recommends first.  Patient counseled on risks, benefits, and alternatives of treatment plan at length.  Patient states an understanding and willingness to proceed with plan.    No orders of the defined types were placed in this encounter. No orders of the defined types were placed in this encounter. Follow Up:  Return in about 4 weeks (around 8/5/2021).       Angelique Kong PA-C

## 2021-07-09 DIAGNOSIS — Z79.2 LONG TERM (CURRENT) USE OF ANTIBIOTICS: Primary | ICD-10-CM

## 2021-07-13 ENCOUNTER — HOSPITAL ENCOUNTER (OUTPATIENT)
Age: 71
Setting detail: SPECIMEN
Discharge: HOME | End: 2021-07-13
Payer: MEDICARE

## 2021-07-13 LAB
ANION GAP SERPL CALCULATED.3IONS-SCNC: 7 MMOL/L (ref 4–16)
BASOPHILS ABSOLUTE: 0 K/CU MM
BASOPHILS RELATIVE PERCENT: 0.5 % (ref 0–1)
BUN BLDV-MCNC: 16 MG/DL (ref 6–23)
C-REACTIVE PROTEIN, HIGH SENSITIVITY: 37.8 MG/L
CALCIUM SERPL-MCNC: 8.4 MG/DL (ref 8.3–10.6)
CHLORIDE BLD-SCNC: 101 MMOL/L (ref 99–110)
CO2: 27 MMOL/L (ref 21–32)
CREAT SERPL-MCNC: 1.6 MG/DL (ref 0.9–1.3)
DIFFERENTIAL TYPE: ABNORMAL
DOSE AMOUNT: ABNORMAL
DOSE TIME: ABNORMAL
EOSINOPHILS ABSOLUTE: 0.2 K/CU MM
EOSINOPHILS RELATIVE PERCENT: 3.1 % (ref 0–3)
ERYTHROCYTE SEDIMENTATION RATE: 79 MM/HR (ref 0–20)
GFR AFRICAN AMERICAN: 52 ML/MIN/1.73M2
GFR NON-AFRICAN AMERICAN: 43 ML/MIN/1.73M2
GLUCOSE BLD-MCNC: 198 MG/DL (ref 70–99)
HCT VFR BLD CALC: 29.6 % (ref 42–52)
HEMOGLOBIN: 9.1 GM/DL (ref 13.5–18)
IMMATURE NEUTROPHIL %: 0.2 % (ref 0–0.43)
LYMPHOCYTES ABSOLUTE: 0.9 K/CU MM
LYMPHOCYTES RELATIVE PERCENT: 15.3 % (ref 24–44)
MCH RBC QN AUTO: 28.3 PG (ref 27–31)
MCHC RBC AUTO-ENTMCNC: 30.7 % (ref 32–36)
MCV RBC AUTO: 91.9 FL (ref 78–100)
MONOCYTES ABSOLUTE: 0.4 K/CU MM
MONOCYTES RELATIVE PERCENT: 6.5 % (ref 0–4)
NUCLEATED RBC %: 0 %
PDW BLD-RTO: 15.7 % (ref 11.7–14.9)
PLATELET # BLD: 207 K/CU MM (ref 140–440)
PMV BLD AUTO: 9.9 FL (ref 7.5–11.1)
POTASSIUM SERPL-SCNC: 4.7 MMOL/L (ref 3.5–5.1)
RBC # BLD: 3.22 M/CU MM (ref 4.6–6.2)
SEGMENTED NEUTROPHILS ABSOLUTE COUNT: 4.4 K/CU MM
SEGMENTED NEUTROPHILS RELATIVE PERCENT: 74.4 % (ref 36–66)
SODIUM BLD-SCNC: 135 MMOL/L (ref 135–145)
TOTAL IMMATURE NEUTOROPHIL: 0.01 K/CU MM
TOTAL NUCLEATED RBC: 0 K/CU MM
VANCOMYCIN TROUGH: 8 UG/ML (ref 10–20)
WBC # BLD: 5.9 K/CU MM (ref 4–10.5)

## 2021-07-13 PROCEDURE — 85025 COMPLETE CBC W/AUTO DIFF WBC: CPT

## 2021-07-13 PROCEDURE — 80202 ASSAY OF VANCOMYCIN: CPT

## 2021-07-13 PROCEDURE — 85652 RBC SED RATE AUTOMATED: CPT

## 2021-07-13 PROCEDURE — 86140 C-REACTIVE PROTEIN: CPT

## 2021-07-13 PROCEDURE — 80048 BASIC METABOLIC PNL TOTAL CA: CPT

## 2021-07-14 ENCOUNTER — OFFICE VISIT (OUTPATIENT)
Dept: INFECTIOUS DISEASES | Age: 71
End: 2021-07-14
Payer: MEDICARE

## 2021-07-14 VITALS
HEART RATE: 68 BPM | TEMPERATURE: 97.2 F | RESPIRATION RATE: 18 BRPM | DIASTOLIC BLOOD PRESSURE: 62 MMHG | SYSTOLIC BLOOD PRESSURE: 173 MMHG

## 2021-07-14 DIAGNOSIS — L97.509 DIABETIC FOOT ULCER WITH OSTEOMYELITIS (HCC): Primary | ICD-10-CM

## 2021-07-14 DIAGNOSIS — Z79.2 RECEIVING INTRAVENOUS ANTIBIOTIC TREATMENT AS OUTPATIENT: ICD-10-CM

## 2021-07-14 DIAGNOSIS — N18.32 STAGE 3B CHRONIC KIDNEY DISEASE (HCC): ICD-10-CM

## 2021-07-14 DIAGNOSIS — E11.69 DIABETIC FOOT ULCER WITH OSTEOMYELITIS (HCC): Primary | ICD-10-CM

## 2021-07-14 DIAGNOSIS — M86.9 DIABETIC FOOT ULCER WITH OSTEOMYELITIS (HCC): Primary | ICD-10-CM

## 2021-07-14 DIAGNOSIS — E11.621 DIABETIC FOOT ULCER WITH OSTEOMYELITIS (HCC): Primary | ICD-10-CM

## 2021-07-14 PROCEDURE — 4040F PNEUMOC VAC/ADMIN/RCVD: CPT | Performed by: INTERNAL MEDICINE

## 2021-07-14 PROCEDURE — 3017F COLORECTAL CA SCREEN DOC REV: CPT | Performed by: INTERNAL MEDICINE

## 2021-07-14 PROCEDURE — 3051F HG A1C>EQUAL 7.0%<8.0%: CPT | Performed by: INTERNAL MEDICINE

## 2021-07-14 PROCEDURE — G8427 DOCREV CUR MEDS BY ELIG CLIN: HCPCS | Performed by: INTERNAL MEDICINE

## 2021-07-14 PROCEDURE — 2022F DILAT RTA XM EVC RTNOPTHY: CPT | Performed by: INTERNAL MEDICINE

## 2021-07-14 PROCEDURE — 99214 OFFICE O/P EST MOD 30 MIN: CPT | Performed by: INTERNAL MEDICINE

## 2021-07-14 PROCEDURE — 1111F DSCHRG MED/CURRENT MED MERGE: CPT | Performed by: INTERNAL MEDICINE

## 2021-07-14 PROCEDURE — 1123F ACP DISCUSS/DSCN MKR DOCD: CPT | Performed by: INTERNAL MEDICINE

## 2021-07-14 PROCEDURE — 1036F TOBACCO NON-USER: CPT | Performed by: INTERNAL MEDICINE

## 2021-07-14 PROCEDURE — G8417 CALC BMI ABV UP PARAM F/U: HCPCS | Performed by: INTERNAL MEDICINE

## 2021-07-14 NOTE — PROGRESS NOTES
 Chronic kidney disease, stage III (moderate) ([facility])    Mixed hyperlipidemia    Sick sinus syndrome (HCC)    Type 2 diabetes mellitus with diabetic polyneuropathy ([facility])    Spinal stenosis of lumbar region    Obesity, Class I, BMI 30-34.9    S/P partial colectomy    Tubulovillous adenoma of colon    Microalbuminuria    WD-PVD (peripheral vascular disease) ([facility])    Limb ischemia    Necrotic toes (HCC)    Toe gangrene ([facility])    Diabetic foot infection ([address]    Chronic kidney disease (CKD) stage G3a/A2, moderately decreased glomerular filtration rate (GFR) between 45-59 mL/min/1.73 square meter and albuminuria creatinine ratio between  mg/g ([facility])    Edema    ICD (implantable cardioverter-defibrillator) battery depletion    Hyperkalemia    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[facility])    Ischemia of toe    Acute kidney injury ([facility])    Fluid overload    DM (diabetes mellitus) ([facility])    Precordial pain    Acute chest pain    Unstable angina ([facility])    Chronic kidney disease (CKD) stage G3a/A3, moderately decreased glomerular filtration rate (GFR) between 45-59 mL/min/1.73 square meter and albuminuria creatinine ratio greater than 300 mg/g (HCC)    Cardiomyopathy (HCC)    Diabetic neuropathy (HCC)    HTN (hypertension)    Epigastric pain    Acute on chronic congestive heart failure (HCC)    Bilateral lower extremity edema    Acute on chronic systolic CHF (congestive heart failure) ([facility])    Diabetic foot ulcer with osteomyelitis ([address]    Visit for wound check    Moderate malnutrition ([address]    Long term (current) use of antibiotics    WD-Diabetic ulcer of toe of right foot associated with type 2 diabetes mellitus, with fat layer exposed ([address]    WD-Diabetic ulcer of toe of left foot associated with type 2 diabetes mellitus, with fat layer exposed ([address]    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    Persistent wound pain    Receiving intravenous antibiotic treatment as outpatient       Current Outpatient Medications Medication Sig Dispense Refill    gabapentin (NEURONTIN) 300 MG capsule Take 1 capsule by mouth 3 times daily for 90 days. 90 capsule 3    losartan (COZAAR) 100 MG tablet       SPIRIVA HANDIHALER 18 MCG inhalation capsule       insulin lispro, 1 Unit Dial, (HUMALOG KWIKPEN) 100 UNIT/ML SOPN Inject into the skin 2 times daily      chlorthalidone (HYGROTEN) 50 MG tablet Take 1 tablet by mouth daily 30 tablet 0    amLODIPine (NORVASC) 10 MG tablet Take 1 tablet by mouth daily 30 tablet 0    atorvastatin (LIPITOR) 40 MG tablet Take 1 tablet by mouth nightly 30 tablet 3    carvedilol (COREG) 3.125 MG tablet Take 1 tablet by mouth 2 times daily 60 tablet 3    albuterol sulfate HFA (VENTOLIN HFA) 108 (90 Base) MCG/ACT inhaler Inhale 2 puffs into the lungs every 4 hours as needed for Wheezing 1 Inhaler 3    Calcium Alginate (ALGICELL CALCIUM DRESSING 4\"X8) MISC Apply 1 Device topically daily 30 each 3    PROMOGRAN COREY WOUND DRESSING MATRIX Apply topically Apply to left daily and cover with gauze 1 Package 3    clopidogrel (PLAVIX) 75 MG tablet Take 1 tablet by mouth daily 30 tablet 11     No current facility-administered medications for this visit. Past Medical History:   Diagnosis Date    Acid reflux     Acute MI ([facility] [address] 2004, 2008    Arthritis     Back    Broken teeth     Upper Front    CAD (coronary artery disease)     Sees Dr. Cristi Holder [facility])     per old chart    CHF (congestive heart failure) ([facility] [facility]     Chronic back pain     Chronic kidney disease     STAGE 3 KIDNEY FAILURE- \"from my diabetes- do not follow with any one- have seen Dr Mercedes Miller in the past\"    Diabetes mellitus ([facility] [address] [facility] Dx 1965    per old chart pt has been diabetic since age 13    Diabetic neuropathy ([facility] [facility]     \"in my feet\"    H/O cardiovascular stress test 08/25/2016    H/O Doppler ultrasound 09/27/2018    Moderate disease of the right lower extremity with an JALEN of 0.72.   Moderate to severe disease of Partner Violence:     Fear of Current or Ex-Partner:     Emotionally Abused:     Physically Abused:     Sexually Abused:        Family History   Problem Relation Age of Onset    Diabetes Mother     Stroke Mother     High Blood Pressure Mother     Vision Loss Mother     Cancer Father         Prostate Cancer    Diabetes Sister     Neuropathy Sister     Other Sister         \"Breathing Problems\"    Heart Disease Sister     Early Death Sister 62        Heart Complications    Cancer Brother         \"Stomach Cancer\"    High Blood Pressure Brother     Diabetes Brother     Heart Disease Brother     High Blood Pressure Brother     Cancer Son         \"Testicle Cancer\"       Vital Signs:  Vitals:    07/14/21 1117   BP: (!) 173/62   Site: Right Upper Arm   Position: Sitting   Cuff Size: Medium Adult   Pulse: 68   Resp: 18   Temp: 97.2 °F (36.2 °C)   TempSrc: Infrared        Wt Readings from Last 3 Encounters:   07/14/21 265 lb (120.2 kg)   07/08/21 265 lb (120.2 kg)   07/01/21 265 lb (120.2 kg)        Physical Exam:   Gen: alert and NAD  HEENT: sclera clear, pupils equal and reactive, extra ocular muscles intact, oropharynx clear, mucus membranes moist, tympanic membranes clear bilaterally, no cervical lymphadenopathy noted and neck supple  Neck: supple, no significant adenopathy  Chest: clear to auscultation, no wheezes, rales or rhonchi, symmetric air entry  Heart: regular rate and rhythm, no murmurs  ABD: abdomen is soft without significant tenderness, masses, organomegaly or guarding. EXT:peripheral pulses normal, no pedal edema, no clubbing or cyanosis  NEURO: alert, oriented, normal speech, no focal findings or movement disorder noted  Skin: well hydrated, no lesions, surgical site examined  Wounds: bilateral hallux ulcer.    Labs:   WBC   Date Value Ref Range Status   07/15/2021 5.9 4.0 - 10.5 K/CU MM Final   07/13/2021 5.9 4.0 - 10.5 K/CU MM Final   07/08/2021 6.3 4.0 - 10.5 K/CU MM Final CREATININE   Date Value Ref Range Status   07/15/2021 1.5 (H) 0.9 - 1.3 MG/DL Final   07/13/2021 1.6 (H) 0.9 - 1.3 MG/DL Final   07/08/2021 1.3 0.9 - 1.3 MG/DL Final       Cultures:  Culture   Date Value Ref Range Status   06/08/2021 Final Report No anaerobes isolated  Final   06/08/2021 PROTEUS MIRABILIS Heavy growth (A)  Final   06/08/2021 (A)  Final    STAPH AUREUS MRSA Moderate growth CONTACT PRECAUTIONS INDICATED PBP2= POSITIVE   06/08/2021 (A)  Final    CORYNEBACTERIUM STRIATUM Moderate growth No further workup       Imaging Studies:   Bilateral lower extremity arterial Doppler 6/8/2021  Aortic or iliac inflow disease, with loss of normal appearing triphasic waveforms at the common femoral arteries. Moderate stenosis within the proximal SFA on the right, with poststenotic reduced velocities noted within the remainder of the left lower extremity, with loss of normal multiphasic waveforms suggesting at least moderate disease. No significant focal severe stenosis seen. Three-vessel runoff to the right foot. Moderate deep femoral artery stenosis on the right, with no severe stenosis seen within the left lower extremity. However the distal PTA and peroneal artery are occluded. Loss of multiphasic waveforms related to at least moderate diffuse disease.          Electronicallysigned by Fanny Morejon MD on 7/14/21 at 6:03 AM EDT

## 2021-07-15 ENCOUNTER — HOSPITAL ENCOUNTER (OUTPATIENT)
Age: 71
Setting detail: SPECIMEN
Discharge: HOME | End: 2021-07-15
Payer: MEDICARE

## 2021-07-15 LAB
ANION GAP SERPL CALCULATED.3IONS-SCNC: 8 MMOL/L (ref 4–16)
BASOPHILS ABSOLUTE: 0 K/CU MM
BASOPHILS RELATIVE PERCENT: 0.3 % (ref 0–1)
BUN BLDV-MCNC: 15 MG/DL (ref 6–23)
C-REACTIVE PROTEIN, HIGH SENSITIVITY: 35.6 MG/L
CALCIUM SERPL-MCNC: 8.4 MG/DL (ref 8.3–10.6)
CHLORIDE BLD-SCNC: 102 MMOL/L (ref 99–110)
CO2: 26 MMOL/L (ref 21–32)
CREAT SERPL-MCNC: 1.5 MG/DL (ref 0.9–1.3)
DIFFERENTIAL TYPE: ABNORMAL
DOSE AMOUNT: ABNORMAL
DOSE TIME: ABNORMAL
EOSINOPHILS ABSOLUTE: 0.1 K/CU MM
EOSINOPHILS RELATIVE PERCENT: 2.2 % (ref 0–3)
ERYTHROCYTE SEDIMENTATION RATE: 85 MM/HR (ref 0–20)
GFR AFRICAN AMERICAN: 56 ML/MIN/1.73M2
GFR NON-AFRICAN AMERICAN: 46 ML/MIN/1.73M2
GLUCOSE BLD-MCNC: 157 MG/DL (ref 70–99)
HCT VFR BLD CALC: 29.1 % (ref 42–52)
HEMOGLOBIN: 8.9 GM/DL (ref 13.5–18)
IMMATURE NEUTROPHIL %: 0.2 % (ref 0–0.43)
LYMPHOCYTES ABSOLUTE: 1.1 K/CU MM
LYMPHOCYTES RELATIVE PERCENT: 18.9 % (ref 24–44)
MCH RBC QN AUTO: 27.6 PG (ref 27–31)
MCHC RBC AUTO-ENTMCNC: 30.6 % (ref 32–36)
MCV RBC AUTO: 90.4 FL (ref 78–100)
MONOCYTES ABSOLUTE: 0.3 K/CU MM
MONOCYTES RELATIVE PERCENT: 5.4 % (ref 0–4)
NUCLEATED RBC %: 0 %
PDW BLD-RTO: 15.5 % (ref 11.7–14.9)
PLATELET # BLD: 202 K/CU MM (ref 140–440)
PMV BLD AUTO: 9.6 FL (ref 7.5–11.1)
POTASSIUM SERPL-SCNC: 4.6 MMOL/L (ref 3.5–5.1)
RBC # BLD: 3.22 M/CU MM (ref 4.6–6.2)
SEGMENTED NEUTROPHILS ABSOLUTE COUNT: 4.3 K/CU MM
SEGMENTED NEUTROPHILS RELATIVE PERCENT: 73 % (ref 36–66)
SODIUM BLD-SCNC: 136 MMOL/L (ref 135–145)
TOTAL IMMATURE NEUTOROPHIL: 0.01 K/CU MM
TOTAL NUCLEATED RBC: 0 K/CU MM
VANCOMYCIN TROUGH: 9.8 UG/ML (ref 10–20)
WBC # BLD: 5.9 K/CU MM (ref 4–10.5)

## 2021-07-15 PROCEDURE — 85652 RBC SED RATE AUTOMATED: CPT

## 2021-07-15 PROCEDURE — 85025 COMPLETE CBC W/AUTO DIFF WBC: CPT

## 2021-07-15 PROCEDURE — 80048 BASIC METABOLIC PNL TOTAL CA: CPT

## 2021-07-15 PROCEDURE — 86140 C-REACTIVE PROTEIN: CPT

## 2021-07-15 PROCEDURE — 80202 ASSAY OF VANCOMYCIN: CPT

## 2021-07-18 PROBLEM — Z79.2 RECEIVING INTRAVENOUS ANTIBIOTIC TREATMENT AS OUTPATIENT: Status: ACTIVE | Noted: 2021-07-18

## 2021-07-19 ENCOUNTER — HOSPITAL ENCOUNTER (OUTPATIENT)
Age: 71
Setting detail: SPECIMEN
Discharge: HOME | End: 2021-07-19
Payer: MEDICARE

## 2021-07-19 ENCOUNTER — TELEPHONE (OUTPATIENT)
Dept: WOUND CARE | Age: 71
End: 2021-07-19

## 2021-07-19 LAB
BASOPHILS ABSOLUTE: 0 K/CU MM
BASOPHILS RELATIVE PERCENT: 0.4 % (ref 0–1)
C-REACTIVE PROTEIN, HIGH SENSITIVITY: 41 MG/L
DIFFERENTIAL TYPE: ABNORMAL
DOSE AMOUNT: NORMAL
DOSE TIME: NORMAL
EOSINOPHILS ABSOLUTE: 0.2 K/CU MM
EOSINOPHILS RELATIVE PERCENT: 2.4 % (ref 0–3)
ERYTHROCYTE SEDIMENTATION RATE: 65 MM/HR (ref 0–20)
HCT VFR BLD CALC: 27.6 % (ref 42–52)
HEMOGLOBIN: 8.3 GM/DL (ref 13.5–18)
IMMATURE NEUTROPHIL %: 0.3 % (ref 0–0.43)
LYMPHOCYTES ABSOLUTE: 0.7 K/CU MM
LYMPHOCYTES RELATIVE PERCENT: 10.4 % (ref 24–44)
MCH RBC QN AUTO: 27.6 PG (ref 27–31)
MCHC RBC AUTO-ENTMCNC: 30.1 % (ref 32–36)
MCV RBC AUTO: 91.7 FL (ref 78–100)
MONOCYTES ABSOLUTE: 0.4 K/CU MM
MONOCYTES RELATIVE PERCENT: 6.1 % (ref 0–4)
NUCLEATED RBC %: 0 %
PDW BLD-RTO: 15.9 % (ref 11.7–14.9)
PLATELET # BLD: 186 K/CU MM (ref 140–440)
PMV BLD AUTO: 10.1 FL (ref 7.5–11.1)
RBC # BLD: 3.01 M/CU MM (ref 4.6–6.2)
SEGMENTED NEUTROPHILS ABSOLUTE COUNT: 5.4 K/CU MM
SEGMENTED NEUTROPHILS RELATIVE PERCENT: 80.4 % (ref 36–66)
TOTAL IMMATURE NEUTOROPHIL: 0.02 K/CU MM
TOTAL NUCLEATED RBC: 0 K/CU MM
VANCOMYCIN TROUGH: 12 UG/ML (ref 10–20)
WBC # BLD: 6.7 K/CU MM (ref 4–10.5)

## 2021-07-19 PROCEDURE — 80048 BASIC METABOLIC PNL TOTAL CA: CPT

## 2021-07-19 PROCEDURE — 80202 ASSAY OF VANCOMYCIN: CPT

## 2021-07-19 PROCEDURE — 86140 C-REACTIVE PROTEIN: CPT

## 2021-07-19 PROCEDURE — 85652 RBC SED RATE AUTOMATED: CPT

## 2021-07-19 PROCEDURE — 85025 COMPLETE CBC W/AUTO DIFF WBC: CPT

## 2021-07-19 ASSESSMENT — ENCOUNTER SYMPTOMS
APNEA: 0
CHOKING: 0
EYE REDNESS: 0
RECTAL PAIN: 0
SORE THROAT: 0
COLOR CHANGE: 0
CONSTIPATION: 0
BACK PAIN: 0
ANAL BLEEDING: 0
STRIDOR: 0
EYE ITCHING: 0
PHOTOPHOBIA: 0

## 2021-07-20 LAB
ANION GAP SERPL CALCULATED.3IONS-SCNC: 11 MMOL/L (ref 4–16)
BUN BLDV-MCNC: 18 MG/DL (ref 6–23)
CALCIUM SERPL-MCNC: 8 MG/DL (ref 8.3–10.6)
CHLORIDE BLD-SCNC: 105 MMOL/L (ref 99–110)
CO2: 25 MMOL/L (ref 21–32)
CREAT SERPL-MCNC: 1.7 MG/DL (ref 0.9–1.3)
GFR AFRICAN AMERICAN: 48 ML/MIN/1.73M2
GFR NON-AFRICAN AMERICAN: 40 ML/MIN/1.73M2
GLUCOSE BLD-MCNC: 246 MG/DL (ref 70–99)
POTASSIUM SERPL-SCNC: 4.7 MMOL/L (ref 3.5–5.1)
SODIUM BLD-SCNC: 141 MMOL/L (ref 135–145)

## 2021-07-22 ENCOUNTER — HOSPITAL ENCOUNTER (OUTPATIENT)
Age: 71
Setting detail: SPECIMEN
Discharge: HOME | End: 2021-07-22
Payer: MEDICARE

## 2021-07-22 LAB
ANION GAP SERPL CALCULATED.3IONS-SCNC: 6 MMOL/L (ref 4–16)
BASOPHILS ABSOLUTE: 0 K/CU MM
BASOPHILS RELATIVE PERCENT: 0.4 % (ref 0–1)
BUN BLDV-MCNC: 17 MG/DL (ref 6–23)
C-REACTIVE PROTEIN, HIGH SENSITIVITY: 54.4 MG/L
CALCIUM SERPL-MCNC: 8.7 MG/DL (ref 8.3–10.6)
CHLORIDE BLD-SCNC: 102 MMOL/L (ref 99–110)
CO2: 28 MMOL/L (ref 21–32)
CREAT SERPL-MCNC: 1.5 MG/DL (ref 0.9–1.3)
DIFFERENTIAL TYPE: ABNORMAL
DOSE AMOUNT: ABNORMAL
DOSE TIME: ABNORMAL
EOSINOPHILS ABSOLUTE: 0.1 K/CU MM
EOSINOPHILS RELATIVE PERCENT: 2.1 % (ref 0–3)
ERYTHROCYTE SEDIMENTATION RATE: 66 MM/HR (ref 0–20)
GFR AFRICAN AMERICAN: 56 ML/MIN/1.73M2
GFR NON-AFRICAN AMERICAN: 46 ML/MIN/1.73M2
GLUCOSE BLD-MCNC: 141 MG/DL (ref 70–99)
HCT VFR BLD CALC: 28.9 % (ref 42–52)
HEMOGLOBIN: 8.9 GM/DL (ref 13.5–18)
IMMATURE NEUTROPHIL %: 0.3 % (ref 0–0.43)
LYMPHOCYTES ABSOLUTE: 0.9 K/CU MM
LYMPHOCYTES RELATIVE PERCENT: 13.4 % (ref 24–44)
MCH RBC QN AUTO: 27.8 PG (ref 27–31)
MCHC RBC AUTO-ENTMCNC: 30.8 % (ref 32–36)
MCV RBC AUTO: 90.3 FL (ref 78–100)
MONOCYTES ABSOLUTE: 0.4 K/CU MM
MONOCYTES RELATIVE PERCENT: 5.1 % (ref 0–4)
NUCLEATED RBC %: 0 %
PDW BLD-RTO: 16 % (ref 11.7–14.9)
PLATELET # BLD: 216 K/CU MM (ref 140–440)
PMV BLD AUTO: 10.4 FL (ref 7.5–11.1)
POTASSIUM SERPL-SCNC: 4.8 MMOL/L (ref 3.5–5.1)
RBC # BLD: 3.2 M/CU MM (ref 4.6–6.2)
SEGMENTED NEUTROPHILS ABSOLUTE COUNT: 5.4 K/CU MM
SEGMENTED NEUTROPHILS RELATIVE PERCENT: 78.7 % (ref 36–66)
SODIUM BLD-SCNC: 136 MMOL/L (ref 135–145)
TOTAL IMMATURE NEUTOROPHIL: 0.02 K/CU MM
TOTAL NUCLEATED RBC: 0 K/CU MM
VANCOMYCIN TROUGH: 8 UG/ML (ref 10–20)
WBC # BLD: 6.8 K/CU MM (ref 4–10.5)

## 2021-07-22 PROCEDURE — 80048 BASIC METABOLIC PNL TOTAL CA: CPT

## 2021-07-22 PROCEDURE — 85652 RBC SED RATE AUTOMATED: CPT

## 2021-07-22 PROCEDURE — 85025 COMPLETE CBC W/AUTO DIFF WBC: CPT

## 2021-07-22 PROCEDURE — 86140 C-REACTIVE PROTEIN: CPT

## 2021-07-22 PROCEDURE — 80202 ASSAY OF VANCOMYCIN: CPT

## 2021-07-23 ENCOUNTER — TELEPHONE (OUTPATIENT)
Dept: INFECTIOUS DISEASES | Age: 71
End: 2021-07-23

## 2021-07-23 DIAGNOSIS — M86.9 DIABETIC FOOT ULCER WITH OSTEOMYELITIS (HCC): Primary | ICD-10-CM

## 2021-07-23 DIAGNOSIS — E11.69 DIABETIC FOOT ULCER WITH OSTEOMYELITIS (HCC): Primary | ICD-10-CM

## 2021-07-23 DIAGNOSIS — L97.509 DIABETIC FOOT ULCER WITH OSTEOMYELITIS (HCC): Primary | ICD-10-CM

## 2021-07-23 DIAGNOSIS — E11.621 DIABETIC FOOT ULCER WITH OSTEOMYELITIS (HCC): Primary | ICD-10-CM

## 2021-07-23 NOTE — TELEPHONE ENCOUNTER
Called from [facility] states pt is finished with vanco 7/23/21. Can the Tunneled central line be removed? Advised to maintain the line until she hears back. Please advise.

## 2021-07-23 NOTE — TELEPHONE ENCOUNTER
Called [facility] and advised the Tunneled PICC can be removed. Order placed in [address] [place] [address] Rd.

## 2021-08-09 ENCOUNTER — TELEPHONE (OUTPATIENT)
Dept: INFECTIOUS DISEASES | Age: 71
End: 2021-08-09

## 2021-08-23 DIAGNOSIS — Z01.812 ENCOUNTER FOR PREPROCEDURE SCREENING LABORATORY TESTING FOR COVID-19: Primary | ICD-10-CM

## 2021-08-23 DIAGNOSIS — Z20.822 ENCOUNTER FOR PREPROCEDURE SCREENING LABORATORY TESTING FOR COVID-19: Primary | ICD-10-CM

## 2021-08-26 ENCOUNTER — HOSPITAL ENCOUNTER (OUTPATIENT)
Dept: LAB | Age: 71
Discharge: HOME | End: 2021-08-26
Payer: MEDICARE

## 2021-08-26 LAB
SARS-COV-2: NOT DETECTED
SOURCE: NORMAL

## 2021-08-26 PROCEDURE — U0003 INFECTIOUS AGENT DETECTION BY NUCLEIC ACID (DNA OR RNA); SEVERE ACUTE RESPIRATORY SYNDROME CORONAVIRUS 2 (SARS-COV-2) (CORONAVIRUS DISEASE [COVID-19]), AMPLIFIED PROBE TECHNIQUE, MAKING USE OF HIGH THROUGHPUT TECHNOLOGIES AS DESCRIBED BY CMS-2020-01-R: HCPCS

## 2021-08-26 PROCEDURE — U0005 INFEC AGEN DETEC AMPLI PROBE: HCPCS

## 2021-08-30 ENCOUNTER — HOSPITAL ENCOUNTER (OUTPATIENT)
Dept: INTERVENTIONAL RADIOLOGY/VASCULAR | Age: 71
Discharge: HOME | End: 2021-08-30
Payer: MEDICARE

## 2021-08-30 ENCOUNTER — HOSPITAL ENCOUNTER (EMERGENCY)
Age: 71
Discharge: HOME | End: 2021-08-30
Attending: EMERGENCY MEDICINE
Payer: MEDICARE

## 2021-08-30 VITALS
BODY MASS INDEX: 35.21 KG/M2 | RESPIRATION RATE: 18 BRPM | DIASTOLIC BLOOD PRESSURE: 107 MMHG | HEART RATE: 62 BPM | WEIGHT: 260 LBS | OXYGEN SATURATION: 94 % | SYSTOLIC BLOOD PRESSURE: 229 MMHG | HEIGHT: 72 IN | TEMPERATURE: 97.4 F

## 2021-08-30 VITALS
BODY MASS INDEX: 35.21 KG/M2 | RESPIRATION RATE: 18 BRPM | DIASTOLIC BLOOD PRESSURE: 74 MMHG | WEIGHT: 260 LBS | HEART RATE: 62 BPM | TEMPERATURE: 97.7 F | OXYGEN SATURATION: 96 % | SYSTOLIC BLOOD PRESSURE: 172 MMHG | HEIGHT: 72 IN

## 2021-08-30 VITALS
OXYGEN SATURATION: 96 % | SYSTOLIC BLOOD PRESSURE: 174 MMHG | HEART RATE: 64 BPM | TEMPERATURE: 96.5 F | RESPIRATION RATE: 18 BRPM | DIASTOLIC BLOOD PRESSURE: 72 MMHG

## 2021-08-30 DIAGNOSIS — N18.6 ESRD (END STAGE RENAL DISEASE) (HCC): ICD-10-CM

## 2021-08-30 DIAGNOSIS — I16.0 HYPERTENSIVE URGENCY: Primary | ICD-10-CM

## 2021-08-30 LAB
APTT: 41.7 SECONDS (ref 25.1–37.1)
HCT VFR BLD CALC: 32.1 % (ref 42–52)
HEMOGLOBIN: 9.9 GM/DL (ref 13.5–18)
INR BLD: 1.03 INDEX
MCH RBC QN AUTO: 27.2 PG (ref 27–31)
MCHC RBC AUTO-ENTMCNC: 30.8 % (ref 32–36)
MCV RBC AUTO: 88.2 FL (ref 78–100)
PDW BLD-RTO: 16 % (ref 11.7–14.9)
PLATELET # BLD: 219 K/CU MM (ref 140–440)
PMV BLD AUTO: 9.6 FL (ref 7.5–11.1)
PROTHROMBIN TIME: 13.3 SECONDS (ref 11.7–14.5)
RBC # BLD: 3.64 M/CU MM (ref 4.6–6.2)
WBC # BLD: 5.3 K/CU MM (ref 4–10.5)

## 2021-08-30 PROCEDURE — 85027 COMPLETE CBC AUTOMATED: CPT

## 2021-08-30 PROCEDURE — 2709999900 HC NON-CHARGEABLE SUPPLY

## 2021-08-30 PROCEDURE — 36589 REMOVAL TUNNELED CV CATH: CPT

## 2021-08-30 PROCEDURE — 2500000003 HC RX 250 WO HCPCS: Performed by: EMERGENCY MEDICINE

## 2021-08-30 PROCEDURE — 7100000010 HC PHASE II RECOVERY - FIRST 15 MIN

## 2021-08-30 PROCEDURE — 96374 THER/PROPH/DIAG INJ IV PUSH: CPT

## 2021-08-30 PROCEDURE — 93005 ELECTROCARDIOGRAM TRACING: CPT | Performed by: EMERGENCY MEDICINE

## 2021-08-30 PROCEDURE — 7100000011 HC PHASE II RECOVERY - ADDTL 15 MIN

## 2021-08-30 PROCEDURE — 85610 PROTHROMBIN TIME: CPT

## 2021-08-30 PROCEDURE — 85730 THROMBOPLASTIN TIME PARTIAL: CPT

## 2021-08-30 PROCEDURE — 99284 EMERGENCY DEPT VISIT MOD MDM: CPT

## 2021-08-30 PROCEDURE — 6360000002 HC RX W HCPCS: Performed by: EMERGENCY MEDICINE

## 2021-08-30 PROCEDURE — 96376 TX/PRO/DX INJ SAME DRUG ADON: CPT

## 2021-08-30 PROCEDURE — 96375 TX/PRO/DX INJ NEW DRUG ADDON: CPT

## 2021-08-30 PROCEDURE — 6370000000 HC RX 637 (ALT 250 FOR IP): Performed by: EMERGENCY MEDICINE

## 2021-08-30 RX ORDER — MORPHINE SULFATE 4 MG/ML
4 INJECTION, SOLUTION INTRAMUSCULAR; INTRAVENOUS ONCE
Status: COMPLETED | OUTPATIENT
Start: 2021-08-30 | End: 2021-08-30

## 2021-08-30 RX ORDER — HYDRALAZINE HYDROCHLORIDE 20 MG/ML
10 INJECTION INTRAMUSCULAR; INTRAVENOUS ONCE
Status: COMPLETED | OUTPATIENT
Start: 2021-08-30 | End: 2021-08-30

## 2021-08-30 RX ORDER — LOSARTAN POTASSIUM 50 MG/1
100 TABLET ORAL ONCE
Status: DISCONTINUED | OUTPATIENT
Start: 2021-08-30 | End: 2021-08-30

## 2021-08-30 RX ORDER — HYDRALAZINE HYDROCHLORIDE 20 MG/ML
20 INJECTION INTRAMUSCULAR; INTRAVENOUS ONCE
Status: DISCONTINUED | OUTPATIENT
Start: 2021-08-30 | End: 2021-08-30

## 2021-08-30 RX ORDER — METOPROLOL TARTRATE 5 MG/5ML
5 INJECTION INTRAVENOUS ONCE
Status: COMPLETED | OUTPATIENT
Start: 2021-08-30 | End: 2021-08-30

## 2021-08-30 RX ORDER — CARVEDILOL 6.25 MG/1
3.12 TABLET ORAL ONCE
Status: DISCONTINUED | OUTPATIENT
Start: 2021-08-30 | End: 2021-08-30

## 2021-08-30 RX ORDER — CLONIDINE HYDROCHLORIDE 0.1 MG/1
0.2 TABLET ORAL ONCE
Status: COMPLETED | OUTPATIENT
Start: 2021-08-30 | End: 2021-08-30

## 2021-08-30 RX ORDER — AMLODIPINE BESYLATE 5 MG/1
10 TABLET ORAL ONCE
Status: COMPLETED | OUTPATIENT
Start: 2021-08-30 | End: 2021-08-30

## 2021-08-30 RX ADMIN — HYDRALAZINE HYDROCHLORIDE 10 MG: 20 INJECTION INTRAMUSCULAR; INTRAVENOUS at 12:41

## 2021-08-30 RX ADMIN — METOPROLOL TARTRATE 5 MG: 1 INJECTION, SOLUTION INTRAVENOUS at 12:40

## 2021-08-30 RX ADMIN — METOPROLOL TARTRATE 5 MG: 5 INJECTION INTRAVENOUS at 11:37

## 2021-08-30 RX ADMIN — CLONIDINE HYDROCHLORIDE 0.2 MG: 0.1 TABLET ORAL at 12:11

## 2021-08-30 RX ADMIN — AMLODIPINE BESYLATE 10 MG: 5 TABLET ORAL at 11:12

## 2021-08-30 RX ADMIN — METOPROLOL TARTRATE 5 MG: 1 INJECTION, SOLUTION INTRAVENOUS at 12:16

## 2021-08-30 RX ADMIN — MORPHINE SULFATE 4 MG: 4 INJECTION INTRAVENOUS at 11:15

## 2021-08-30 ASSESSMENT — PAIN - FUNCTIONAL ASSESSMENT
PAIN_FUNCTIONAL_ASSESSMENT: 0-10
PAIN_FUNCTIONAL_ASSESSMENT: PREVENTS OR INTERFERES WITH ALL ACTIVE AND SOME PASSIVE ACTIVITIES

## 2021-08-30 ASSESSMENT — PAIN SCALES - GENERAL
PAINLEVEL_OUTOF10: 10
PAINLEVEL_OUTOF10: 7
PAINLEVEL_OUTOF10: 5

## 2021-08-30 ASSESSMENT — PAIN DESCRIPTION - DESCRIPTORS: DESCRIPTORS: SHARP;SHOOTING;STABBING

## 2021-08-30 NOTE — PROGRESS NOTES
PROCEDURE PERFORMED: Remove Tunnel PICC    PRIMARY INDICATION FOR PROCEDURE: End Use    ASSESSMENT:  WNL    BARRIER PRECAUTIONS & STERILE TECHNIQUE:               Pt remains in bed supine position for procedure. Warm blankets given. Pt prepped and draped in a sterile fashion with chlorhexadine. PAIN/LOCAL ANESTHESIA/SEDATION MANAGEMENT:           Local: Lidocaine given by Malcolm RT            INTRAOPERATIVE: IR team arrived in [facility] at 1540 to removed tunnel PICC. Tunnel PICC removed at 1543. Pressure held for 5 mins per Charles Doherty RT.            STERILE DRESSINGS: 4x4 with tegaderm    EBL:  Less than 1 cc    COMPLICATIONS/ OUTCOME:  None    STAFF PRESENT DURING PROCEDURE: Dr Donald Washington RT, Ella Schmitt RN    AYANNA SCORE POST PROCEDURE: 10    REPORT CALLED TO: Kimmie Andersen RN in [facility]    IR Team left [facility] at: [phone]

## 2021-08-30 NOTE — ED NOTES
Discharge instructions reviewed with patient, verbalized understanding and agreeable to discharge.      Bety Lowery RN  08/30/21 [id number]

## 2021-08-30 NOTE — ED NOTES
Pt given 2x toradol 5mg IV, BP still >200/100. Given 3rd 5mg dose + hydralazine 10mg IV, BP recheck in 15 min. Pt is lying in bed with eyes closed, moaning in pain (7/10 reported) at this time. C/o burning in feet, says they have neuropathy and gabapentin doesn't work anymore.      Oliverio Richter RN  08/30/21 0530

## 2021-08-30 NOTE — ED NOTES
Bed: ED-08  Expected date:   Expected time:   Means of arrival:   Comments:  Same day pt     Blanca Miller RN  08/30/21 2284

## 2021-08-30 NOTE — ED PROVIDER NOTES
CHIEF COMPLAINT  Chief Complaint   Patient presents with    Hypertension       HPI  Christiano Alicea is a 79 y.o. male with history of CAD, CHF, cardiomyopathy, diabetes, necrotic feet with wound care needs, diabetic neuropathy who presents from same-day surgery after his intake vital signs showed that he was hypertensive. Patient states that he feels a baseline level of health, he denies any chest pain, shortness of breath, urinary changes, dizziness, confusion or weakness. He states \"they were to pull my PICC line out but then they sent me here and I do not know why\". When I contacted same-day surgery they report that his blood pressure was elevated and they sent him here for evaluation. The patient states the only reason his blood pressure is significantly elevated is because he did not take his antihypertensives this morning as he was directed to be n.p.o. He states his blood pressure is usually high, systolic of 361. He has been having wound care per home health and has been having his feet have improvement clinically, finished his antibiotics.       REVIEW OF SYSTEMS  Review of Systems   History obtained from chart review and the patient  General ROS: negative for - chills or fever  Ophthalmic ROS: negative  ENT ROS: negative  Hematological and Lymphatic ROS: negative for - blood clots or blood transfusions  Endocrine ROS: negative for - unexpected weight changes  Respiratory ROS: no cough, shortness of breath, or wheezing  Cardiovascular ROS: no chest pain or dyspnea on exertion  Gastrointestinal ROS: no abdominal pain, change in bowel habits, or black or bloody stools  Genito-Urinary ROS: no dysuria, trouble voiding, or hematuria  Musculoskeletal ROS: positive for -chronic feet pain  Neurological ROS: no TIA or stroke symptoms      PAST MEDICAL HISTORY  Past Medical History:   Diagnosis Date    Acid reflux     Acute MI ([facility] [address] 2004, 2008    Arthritis     Back    Broken teeth     Upper Front    CAD (coronary artery disease)     Sees Dr. Cristi Holder [facility])     per old chart    CHF (congestive heart failure) (HCC)     Chronic back pain     Chronic kidney disease     STAGE 3 KIDNEY FAILURE- \"from my diabetes- do not follow with any one- have seen Dr Mercedes Miller in the past\"    Diabetes mellitus ([facility] [address] Dx 1965    per old chart pt has been diabetic since age 13    Diabetic neuropathy ([facility] [address]     \"in my feet\"    H/O cardiovascular stress test 08/25/2016    H/O Doppler ultrasound 09/27/2018    Moderate disease of the right lower extremity with an JALEN of 0.72.   Moderate to severe disease of the left lower extremity with an JALEN of 0.55.   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    Neuropathy     both feet    neuropathy     PAD (peripheral artery disease) ([address] 09/27/2018    PVD (peripheral vascular disease) ([address]     Sick sinus syndrome ([address]     Sleep apnea     \"sleep study 3 yrs ago- could not tolerate the cpap made me too dry\"    Spinal stenosis     Teeth missing     Upper And Lower    Type 2 diabetes mellitus without complication ([address]        FAMILY HISTORY  Family History   Problem Relation Age of Onset    Diabetes Mother     Stroke Mother     High Blood Pressure Mother     Vision Loss Mother     Cancer Father         Prostate Cancer    Diabetes Sister     Neuropathy Sister     Other Sister         \"Breathing Problems\"    Heart Disease Sister     Early Death Sister 62        Heart Complications    Cancer Brother         \"Stomach Cancer\"    High Blood Pressure Brother     Diabetes Brother    Anupama Welsh Heart Disease Brother     High Blood Pressure Brother     Cancer Son         \"Testicle Cancer\"       SOCIAL HISTORY  Social History     Socioeconomic History    Marital status:      Spouse name: Not on file    Number of children: Not on file    Years of education: Not on file    Highest education level: Not on file   Occupational History    Not on file   Tobacco Use    Smoking status: Former Smoker     Packs/day: 0.10     Years: 36.00     Pack years: 3.60     Types: Cigars     Start date: 1/1/1980    Smokeless tobacco: Never Used    Tobacco comment: Client states he has stopped smoking   Vaping Use    Vaping Use: Never used   Substance and Sexual Activity    Alcohol use: No    Drug use: Yes     Types: Marijuana    Sexual activity: Never   Other Topics Concern    Not on file   Social History Narrative    Not on file     Social Determinants of Health     Financial Resource Strain:     Difficulty of Paying Living Expenses:    Food Insecurity:     Worried About Running Out of Food in the Last Year:     [address] in the Last Year:    Transportation Needs:     Lack of Transportation (Medical):      Lack of Transportation (Non-Medical):    Physical Activity:     Days of Exercise per Week:     Minutes of Exercise per Session:    Stress:     Feeling of Stress :    Social Connections:     Frequency of Communication with Friends and Family:     Frequency of Social Gatherings with Friends and Family:     Attends Scientologist Services:     Active Member of Clubs or Organizations:     Attends Club or Organization Meetings:     Marital Status:    Intimate Partner Violence:     Fear of Current or Ex-Partner:     Emotionally Abused:     Physically Abused:     Sexually Abused:        SURGICAL HISTORY  Past Surgical History:   Procedure Laterality Date    CARDIAC CATHETERIZATION      per old chart done 10/2014    CARDIAC CATHETERIZATION  07/14/2017    with angiography of leg    CARDIAC CATHETERIZATION  11/20/2018    PATENT STENTS OF ALL THREE MAJOR VESSELS    CARDIAC DEFIBRILLATOR PLACEMENT  06/04/2010    [employer] [address] Defibrillator Implanted    COLECTOMY Right 08/26/2016    laparascopic; robotic assisted    COLONOSCOPY  08/04/2016    CORONARY ANGIOPLASTY      \"15 Heart Stents\"    CORONARY ANGIOPLASTY WITH STENT PLACEMENT      per old chart had angio with stent to circumflex and obtuse marginal artery at [facility] 5/2010( old chart also gives hx of stent placement done 2000,2004 and 2005)    DENTAL SURGERY      Teeth Extracted In Past    IR TUNNELED CATHETER PLACEMENT GREATER THAN 5 YEARS  6/14/2021    IR TUNNELED CATHETER PLACEMENT GREATER THAN 5 YEARS 6/14/2021 [facility] SPECIAL PROCEDURES    PACEMAKER PLACEMENT  06/04/2010    [employer] [address] Defibrillator Implanted    TOE AMPUTATION Right 09/12/2017    Rt 3rd toe    TOE AMPUTATION Right 01/09/2018     Right 5th toe amputation and Toenails trimmed left 2,3,4 and 5th toes    TOE AMPUTATION Left 12/26/2020    LEFT GREAT TOE AMPUTATION performed by Kvng Blair MD at [address]      per old chart had balloon angioplasty right superfical femoral artery,right popliteal artery,,right ant.tibial artery, right tibioperoneal trunk, and right post.tibial artery wna stent placement right popliteal artery and superfical femoral artery 7/2012       CURRENT MEDICATIONS  No current facility-administered medications on file prior to encounter. Current Outpatient Medications on File Prior to Encounter   Medication Sig Dispense Refill    gabapentin (NEURONTIN) 300 MG capsule Take 1 capsule by mouth 3 times daily for 90 days.  90 capsule 3    losartan (COZAAR) 100 MG tablet       SPIRIVA HANDIHALER 18 MCG inhalation capsule       insulin lispro, 1 Unit Dial, (HUMALOG KWIKPEN) 100 UNIT/ML SOPN Inject into the skin 2 times daily      chlorthalidone (HYGROTEN) 50 MG tablet Take 1 tablet by mouth daily 30 tablet 0    amLODIPine (NORVASC) 10 MG tablet Take 1 tablet by mouth daily 30 tablet 0    atorvastatin (LIPITOR) 40 MG tablet Take 1 tablet by mouth nightly 30 tablet 3    carvedilol (COREG) 3.125 MG tablet Take 1 tablet by mouth 2 times daily 60 tablet 3    albuterol sulfate HFA (VENTOLIN HFA) 108 (90 Base) MCG/ACT inhaler Inhale 2 puffs into the lungs every 4 hours as needed for Wheezing 1 Inhaler 3    Calcium Alginate (ALGICELL CALCIUM DRESSING 4\"X8) MISC Apply 1 Device topically daily 30 each 3    PROMOGRAN COREY WOUND DRESSING MATRIX Apply topically Apply to left daily and cover with gauze 1 Package 3    clopidogrel (PLAVIX) 75 MG tablet Take 1 tablet by mouth daily 30 tablet 11         ALLERGIES  Allergies   Allergen Reactions    Pcn [Penicillins] Hives    Fentanyl Itching       PHYSICAL EXAM  VITAL SIGNS: BP (!) 172/74   Pulse 62   Temp 97.7 °F (36.5 °C) (Oral)   Resp 18   Ht 6' (1.829 m)   Wt 260 lb (117.9 kg)   SpO2 96%   BMI 35.26 kg/m²   Constitutional: Chronically unwell in appearance, resting in bed  HENT: Normocephalic, Atraumatic, Bilateral external ears normal, Oropharynx moist, No oral exudates, Nose normal.   Eyes: PERRL, EOMI, Conjunctiva normal, No discharge. Neck: Normal range of motion, Supple, No stridor. Cardiovascular: Normal heart rate, Normal rhythm, No murmurs, No rubs, No gallops. Thorax & Lungs: Normal breath sounds, No respiratory distress, No wheezing, No chest tenderness. Tunneled PICC line in place right upper chest  Abdomen: Bowel sounds normal, Soft, No tenderness, no guarding, no rebound, No masses, No pulsatile masses. Skin: Warm, Dry, bilateral skin ulcerations distal toes without significant surrounding erythema, other dressings appear clean, dry and intact  Extremities: Intact distal pulses, No edema, No tenderness, No cyanosis, No clubbing. Musculoskeletal: Good gross range of motion in all major joints. No major deformities noted.    Neurologic: Alert & oriented x 3, Normal gross motor function, Normal gross sensory function, No focal deficits noted. Psychiatric: Affect normal    EKG  EKG Interpretation    Interpreted by emergency department physician from August 30 at 1028    Rhythm: normal sinus   Rate: normal  Axis: normal  Ectopy: Intermittent PVC versus Motrin  Conduction: nonspecific interventricular conduction block  ST Segments: nonspecific changes  T Waves: non specific changes  Q Waves: none    Clinical Impression: Atrially paced rhythm with nonspecific conduction delay and diffuse ST changes, hindered by motion, no STEMI or chest pain    Ge Arteaga MD      RADIOLOGY/PROCEDURES/LABS    Medications   morphine sulfate (PF) injection 4 mg (4 mg IntraVENous Given 8/30/21 1115)   amLODIPine (NORVASC) tablet 10 mg (10 mg Oral Given 8/30/21 1112)   metoprolol (LOPRESSOR) injection 5 mg (5 mg IntraVENous Given 8/30/21 1137)   metoprolol (LOPRESSOR) injection 5 mg (5 mg IntraVENous Given 8/30/21 1216)   cloNIDine (CATAPRES) tablet 0.2 mg (0.2 mg Oral Given 8/30/21 1211)   hydrALAZINE (APRESOLINE) injection 10 mg (10 mg IntraVENous Given 8/30/21 1241)   metoprolol (LOPRESSOR) injection 5 mg (5 mg IntraVENous Given 8/30/21 1240)       COURSE & MEDICAL DECISION MAKING  Pertinent Labs & Imaging studies reviewed. (See chart for details)    79-year-old male was sent from same-day surgery due to concern for elevated blood pressure. He stated the reason his blood pressure was elevated was because he had not yet taken his daily medications in preparation for n.p.o. and that his blood pressure is always elevated with systolics of 096 being his baseline. He is also upset and in pain as he was wanting his PICC line removed today. His pain was managed with morphine, he did require multiple doses of blood pressure medication to control his blood pressure.   I have concern that he was having beta-blocker withdrawal.  He was treated with push dose of Lopressor with minimal change in his signs and symptoms. He did respond to the hydralazine, clonidine and Norvasc. His blood pressure improved with systolic of 179. I contacted IR, they are agreeable to remove his PICC line and recommend that he be discharged to same-day surgery. His angio procedure will need to be rescheduled, family is in agreement with this plan of care. He is not having signs of endorgan damage based on his clinical exam and he refuses labs and imaging of his feet. He is not having signs of CHF, encephalopathy, he denies any urinary changes. He does not wish for evaluation of his blood pressure as he states that it is usually elevated and he is not concerned regarding this. Multiple reevaluation, patient resting comfortably, discharged in improved condition. CRITICAL CARE NOTE:  There was a high probability of clinically significant life-threatening deterioration of the patient's condition requiring my urgent intervention due to hypertensive urgency with multiple medications. Multiple medications and reevaluation's was performed to address this. Total critical care time is 35 minutes. This includes vital sign monitoring, pulse oximetry monitoring, telemetry monitoring, clinical response to the IV medications, reviewing the nursing notes, consultation time, dictation/documentation time, and interpretation of the lab work. This time excludes time spent performing procedures and separately billable procedures and family discussion time. FINAL IMPRESSION  Problem List Items Addressed This Visit     None      Visit Diagnoses     Hypertensive urgency    -  Primary      1.    2.   3.    Patient gave me permission to discuss medical history, care, and plan with those present in the room.   Electronically signed by: Donya Collado MD, 8/30/2021  MD Donya Ho MD  08/30/21 [id number]

## 2021-08-30 NOTE — PROGRESS NOTES
0940 Trini from IR called, updated on blood pressure. Plan to cancel procedure due to elevated blood pressure. Encouraged patient to be seen in ER for blood pressure. 46 Family updated on plan. Family agrees with plan. 0950 Voided 675 cc clear magi urine without difficulty per urinal.    0956 Report given to [facility] [address] in ED. Pt transferred via bed to ER.   [facility] RN

## 2021-08-30 NOTE — PROGRESS NOTES
Patient returned to [facility] to have line removed by IR, placed in [facility] rm 21, ENE Harris notified

## 2021-09-01 ENCOUNTER — TELEPHONE (OUTPATIENT)
Dept: CARDIOLOGY CLINIC | Age: 71
End: 2021-09-01

## 2021-09-01 NOTE — TELEPHONE ENCOUNTER
I tried to call pt and recording stated to try my call again later. I think this is a call for Dr. Kamari Urbina not us.

## 2021-09-03 LAB
EKG ATRIAL RATE: 65 BPM
EKG DIAGNOSIS: NORMAL
EKG P AXIS: -37 DEGREES
EKG P-R INTERVAL: 286 MS
EKG Q-T INTERVAL: 424 MS
EKG QRS DURATION: 122 MS
EKG QTC CALCULATION (BAZETT): 440 MS
EKG R AXIS: 25 DEGREES
EKG T AXIS: -21 DEGREES
EKG VENTRICULAR RATE: 65 BPM

## 2021-09-03 PROCEDURE — 93010 ELECTROCARDIOGRAM REPORT: CPT | Performed by: INTERNAL MEDICINE

## 2021-09-15 ENCOUNTER — PROCEDURE VISIT (OUTPATIENT)
Dept: CARDIOLOGY CLINIC | Age: 71
End: 2021-09-15
Payer: MEDICARE

## 2021-09-15 DIAGNOSIS — Z95.810 ICD (IMPLANTABLE CARDIOVERTER-DEFIBRILLATOR), DUAL, IN SITU: Primary | ICD-10-CM

## 2021-09-15 DIAGNOSIS — I49.5 SINUS NODE DYSFUNCTION (HCC): ICD-10-CM

## 2021-09-15 PROCEDURE — 93295 DEV INTERROG REMOTE 1/2/MLT: CPT | Performed by: INTERNAL MEDICINE

## 2021-09-15 PROCEDURE — 93296 REM INTERROG EVL PM/IDS: CPT | Performed by: INTERNAL MEDICINE

## 2021-09-15 PROCEDURE — 93297 REM INTERROG DEV EVAL ICPMS: CPT | Performed by: INTERNAL MEDICINE

## 2021-10-07 ENCOUNTER — HOSPITAL ENCOUNTER (OUTPATIENT)
Age: 71
Discharge: HOME | End: 2021-10-07
Payer: MEDICARE

## 2021-10-07 DIAGNOSIS — I73.9 PVD (PERIPHERAL VASCULAR DISEASE) (HCC): ICD-10-CM

## 2021-10-07 LAB
ANION GAP SERPL CALCULATED.3IONS-SCNC: 7 MMOL/L (ref 4–16)
BUN BLDV-MCNC: 14 MG/DL (ref 6–23)
CALCIUM SERPL-MCNC: 8.7 MG/DL (ref 8.3–10.6)
CHLORIDE BLD-SCNC: 96 MMOL/L (ref 99–110)
CO2: 30 MMOL/L (ref 21–32)
CREAT SERPL-MCNC: 1.3 MG/DL (ref 0.9–1.3)
GFR AFRICAN AMERICAN: >60 ML/MIN/1.73M2
GFR NON-AFRICAN AMERICAN: 54 ML/MIN/1.73M2
GLUCOSE BLD-MCNC: 336 MG/DL (ref 70–99)
POTASSIUM SERPL-SCNC: 4.4 MMOL/L (ref 3.5–5.1)
SODIUM BLD-SCNC: 133 MMOL/L (ref 135–145)

## 2021-10-07 PROCEDURE — 36415 COLL VENOUS BLD VENIPUNCTURE: CPT

## 2021-10-07 PROCEDURE — 80048 BASIC METABOLIC PNL TOTAL CA: CPT

## 2021-10-11 ENCOUNTER — TELEPHONE (OUTPATIENT)
Dept: SURGERY | Age: 71
End: 2021-10-11

## 2021-10-11 NOTE — TELEPHONE ENCOUNTER
Maynor called, asking to get new orders for the wound clinic, informed we would need to see him in office since prev visit was in July. Scheduled appt on 10/14/21. Called and spoke to Sterling, she is ok with keeping wound clinic going til reevaluated in office. Called Wound Clinic his treatment plan is still active he just needs to schedule an appt. Called and spoke to [employer], informed her that [address] [place] [address] can still see him they just have to schedule an appt.  Confirmed prev made appt for 10/14/21

## 2021-10-14 ENCOUNTER — OFFICE VISIT (OUTPATIENT)
Dept: SURGERY | Age: 71
End: 2021-10-14
Payer: MEDICARE

## 2021-10-14 VITALS
OXYGEN SATURATION: 86 % | HEART RATE: 65 BPM | BODY MASS INDEX: 35.21 KG/M2 | HEIGHT: 72 IN | SYSTOLIC BLOOD PRESSURE: 128 MMHG | WEIGHT: 260 LBS | DIASTOLIC BLOOD PRESSURE: 72 MMHG

## 2021-10-14 DIAGNOSIS — I96 WET GANGRENE (HCC): Primary | ICD-10-CM

## 2021-10-14 PROCEDURE — 3017F COLORECTAL CA SCREEN DOC REV: CPT | Performed by: SURGERY

## 2021-10-14 PROCEDURE — 99214 OFFICE O/P EST MOD 30 MIN: CPT | Performed by: SURGERY

## 2021-10-14 PROCEDURE — 1036F TOBACCO NON-USER: CPT | Performed by: SURGERY

## 2021-10-14 PROCEDURE — G8484 FLU IMMUNIZE NO ADMIN: HCPCS | Performed by: SURGERY

## 2021-10-14 PROCEDURE — G8417 CALC BMI ABV UP PARAM F/U: HCPCS | Performed by: SURGERY

## 2021-10-14 PROCEDURE — 1123F ACP DISCUSS/DSCN MKR DOCD: CPT | Performed by: SURGERY

## 2021-10-14 PROCEDURE — G8428 CUR MEDS NOT DOCUMENT: HCPCS | Performed by: SURGERY

## 2021-10-14 PROCEDURE — 4040F PNEUMOC VAC/ADMIN/RCVD: CPT | Performed by: SURGERY

## 2021-10-14 RX ORDER — METAXALONE 800 MG/1
800 TABLET ORAL 3 TIMES DAILY
Status: ON HOLD | COMMUNITY
End: 2022-10-24 | Stop reason: HOSPADM

## 2021-10-14 RX ORDER — HYDROCODONE BITARTRATE AND ACETAMINOPHEN 5; 325 MG/1; MG/1
1 TABLET ORAL EVERY 6 HOURS PRN
Qty: 12 TABLET | Refills: 0 | Status: SHIPPED | OUTPATIENT
Start: 2021-10-14 | End: 2021-10-17

## 2021-10-14 NOTE — PROGRESS NOTES
Chief Complaint   Patient presents with    Wound Check     f/u b foot wounds, unables to see vascular and is wanting surgery         SUBJECTIVE:  HPI: Patient is here with complaints of bilateral toe wounds. He recently underwent amputations and redness of his several toes. The left second toe appears to be necrotic. Patient denies any fever or chills. Is wheelchair-bound. Patient has difficulty with mobility and as a result of generalized weakness. He has also severe bilateral lower extremity edema as well as neuropathy as a result of diabetes. I have reviewed the patient's(pertinent information to this visit) medical history, family history(scanned in  the [address] [place] [address] under \"patient questioner\"), social history and review of systems with the patient today in the office.             Past Surgical History:   Procedure Laterality Date    CARDIAC CATHETERIZATION      per old chart done 10/2014    CARDIAC CATHETERIZATION  07/14/2017    with angiography of leg    CARDIAC CATHETERIZATION  11/20/2018    PATENT STENTS OF ALL THREE MAJOR VESSELS    CARDIAC DEFIBRILLATOR PLACEMENT  06/04/2010    [employer]tronic Seceusebio DIAZ Defibrillator Implanted    COLECTOMY Right 08/26/2016    laparascopic; robotic assisted    COLONOSCOPY  08/04/2016    CORONARY ANGIOPLASTY      \"15 Heart Stents\"    CORONARY ANGIOPLASTY WITH STENT PLACEMENT      per old chart had angio with stent to circumflex and obtuse marginal artery at [facility] 5/2010( old chart also gives hx of stent placement done 2000,2004 and 2005)    DENTAL SURGERY      Teeth Extracted In Past    IR TUNNELED CATHETER PLACEMENT GREATER THAN 5 YEARS  6/14/2021    IR TUNNELED CATHETER PLACEMENT GREATER THAN 5 YEARS 6/14/2021 [facility]    PACEMAKER PLACEMENT  06/04/2010    Medtronic [address] Defibrillator Implanted    TOE AMPUTATION Right 09/12/2017    Rt 3rd toe    TOE AMPUTATION Right 01/09/2018     Right 5th toe amputation and Toenails trimmed left 2,3,4 and 5th toes    TOE AMPUTATION Left 12/26/2020    LEFT GREAT TOE AMPUTATION performed by Kris [facility] at [facility]      per old chart had balloon angioplasty right superfical femoral artery,right popliteal artery,,right ant.tibial artery, right tibioperoneal trunk, and right post.tibial artery wna stent placement right popliteal artery and superfical femoral artery 7/2012     Past Medical History:   Diagnosis Date    Acid reflux     Acute MI ([address] 2004, 2008    Arthritis     Back    Broken teeth     Upper Front    CAD (coronary artery disease)     Sees Dr. [facility])     per old chart    CHF (congestive heart failure) (HCC)     Chronic back pain     Chronic kidney disease     STAGE 3 KIDNEY FAILURE- \"from my diabetes- do not follow with any one- have seen Dr Ken Rose in the past\"    Diabetes mellitus ([facility] Dx 1965    per old chart pt has been diabetic since age 13    Diabetic neuropathy ([facility]     \"in my feet\"    H/O cardiovascular stress test 08/25/2016    H/O Doppler ultrasound 09/27/2018    Moderate disease of the right lower extremity with an JALEN of 0.72.   Moderate to severe disease of the left lower extremity with an JALEN of 0.55.   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    Neuropathy     both feet    neuropathy     PAD (peripheral artery disease) ([address] 09/27/2018    PVD (peripheral vascular disease) ([address]     Sick sinus syndrome ([facility]     Sleep apnea     \"sleep study 3 yrs ago- could not tolerate the cpap made me too dry\"    Spinal stenosis     Teeth missing Upper And Lower    Type 2 diabetes mellitus without complication ([facility]      Family History   Problem Relation Age of Onset    Diabetes Mother     Stroke Mother     High Blood Pressure Mother     Vision Loss Mother     Cancer Father         Prostate Cancer    Diabetes Sister     Neuropathy Sister     Other Sister         \"Breathing Problems\"    Heart Disease Sister     Early Death Sister 62        Heart Complications    Cancer Brother         \"Stomach Cancer\"    High Blood Pressure Brother     Diabetes Brother     Heart Disease Brother     High Blood Pressure Brother     Cancer Son         \"Testicle Cancer\"     Social History     Socioeconomic History    Marital status:      Spouse name: Not on file    Number of children: Not on file    Years of education: Not on file    Highest education level: Not on file   Occupational History    Not on file   Tobacco Use    Smoking status: Former Smoker     Packs/day: 0.10     Years: 36.00     Pack years: 3.60     Types: Cigars     Start date: 1/1/1980    Smokeless tobacco: Never Used    Tobacco comment: Client states he has stopped smoking   Vaping Use    Vaping Use: Never used   Substance and Sexual Activity    Alcohol use: No    Drug use: Yes     Types: Marijuana    Sexual activity: Never   Other Topics Concern    Not on file   Social History Narrative    Not on file     Social Determinants of Health     Financial Resource Strain:     Difficulty of Paying Living Expenses:    Food Insecurity:     Worried About Running Out of Food in the Last Year:     [address] in the Last Year:    Transportation Needs:     Lack of Transportation (Medical):      Lack of Transportation (Non-Medical):    Physical Activity:     Days of Exercise per Week:     Minutes of Exercise per Session:    Stress:     Feeling of Stress :    Social Connections:     Frequency of Communication with Friends and Family:     Frequency of Social Gatherings with Friends and Family:     Attends Mosque Services:     Active Member of Clubs or Organizations:     Attends Club or Organization Meetings:     Marital Status:    Intimate Partner Violence:     Fear of Current or Ex-Partner:     Emotionally Abused:     Physically Abused:     Sexually Abused:        Current Outpatient Medications   Medication Sig Dispense Refill    metaxalone (SKELAXIN) 800 MG tablet Take 800 mg by mouth 3 times daily      losartan (COZAAR) 100 MG tablet       SPIRIVA HANDIHALER 18 MCG inhalation capsule       insulin lispro, 1 Unit Dial, (HUMALOG KWIKPEN) 100 UNIT/ML SOPN Inject into the skin 2 times daily Sliding scale dose      chlorthalidone (HYGROTEN) 50 MG tablet Take 1 tablet by mouth daily 30 tablet 0    atorvastatin (LIPITOR) 40 MG tablet Take 1 tablet by mouth nightly 30 tablet 3    carvedilol (COREG) 3.125 MG tablet Take 1 tablet by mouth 2 times daily 60 tablet 3    albuterol sulfate HFA (VENTOLIN HFA) 108 (90 Base) MCG/ACT inhaler Inhale 2 puffs into the lungs every 4 hours as needed for Wheezing 1 Inhaler 3    Calcium Alginate (ALGICELL CALCIUM DRESSING 4\"X8) MISC Apply 1 Device topically daily 30 each 3    PROMOGRAN COREY WOUND DRESSING MATRIX Apply topically Apply to left daily and cover with gauze 1 Package 3    clopidogrel (PLAVIX) 75 MG tablet Take 1 tablet by mouth daily 30 tablet 11    amLODIPine (NORVASC) 10 MG tablet Take 1 tablet by mouth daily 30 tablet 5    doxazosin (CARDURA) 4 MG tablet Take 1 tablet by mouth 2 times daily 60 tablet 3    gabapentin (NEURONTIN) 300 MG capsule Take 1 capsule by mouth 3 times daily for 90 days. 90 capsule 3     No current facility-administered medications for this visit. Allergies   Allergen Reactions    Pcn [Penicillins] Hives    Fentanyl Itching       Review of Systems:         Review of Systems   Musculoskeletal: Positive for gait problem and joint swelling. Skin: Positive for wound. OBJECTIVE:  Physical Exam:    /72 (Site: Left Upper Arm, Position: Sitting, Cuff Size: Large Adult)   Pulse 65   Ht 6' (1.829 m)   Wt 260 lb (117.9 kg)   SpO2 (!) 86%   BMI 35.26 kg/m²      Physical Exam  Constitutional:       Appearance: He is well-developed. HENT:      Head: Normocephalic. Eyes:      Pupils: Pupils are equal, round, and reactive to light. Cardiovascular:      Rate and Rhythm: Normal rate. Pulmonary:      Effort: Pulmonary effort is normal.   Abdominal:      General: There is no distension. Palpations: Abdomen is soft. There is no mass. Tenderness: There is no abdominal tenderness. There is no guarding or rebound. Musculoskeletal:         General: Normal range of motion. Cervical back: Normal range of motion and neck supple. Feet:    Skin:     General: Skin is warm. Neurological:      Mental Status: He is alert and oriented to person, place, and time. ASSESSMENT:  1. Wet gangrene [facility])          PLAN:  Treatment: pt seems frustrated about nothing being done. I explained the need for vascular assessment prior to surgery to avoid worsening wounds. Care discussed with Dr Shelbi Rose. Pt may benefit from co2 angio secondary to his CKD. Patient counseled on risks, benefits, and alternatives of treatment plan at length today. Patient states an understanding and willingness to proceed with plan. No orders of the defined types were placed in this encounter. Orders Placed This Encounter   Medications    HYDROcodone-acetaminophen (NORCO) 5-325 MG per tablet     Sig: Take 1 tablet by mouth every 6 hours as needed for Pain for up to 3 days. Intended supply: 3 days. Take lowest dose possible to manage pain     Dispense:  12 tablet     Refill:  0     Reduce doses taken as pain becomes manageable        Follow Up:  No follow-ups on file.       Zabrina Romero MD

## 2021-10-28 ENCOUNTER — APPOINTMENT (OUTPATIENT)
Dept: GENERAL RADIOLOGY | Age: 71
DRG: 189 | End: 2021-10-28
Payer: MEDICARE

## 2021-10-28 ENCOUNTER — OFFICE VISIT (OUTPATIENT)
Dept: SURGERY | Age: 71
End: 2021-10-28
Payer: MEDICARE

## 2021-10-28 ENCOUNTER — HOSPITAL ENCOUNTER (INPATIENT)
Age: 71
LOS: 1 days | Discharge: HOME | DRG: 189 | End: 2021-10-30
Attending: STUDENT IN AN ORGANIZED HEALTH CARE EDUCATION/TRAINING PROGRAM | Admitting: STUDENT IN AN ORGANIZED HEALTH CARE EDUCATION/TRAINING PROGRAM
Payer: MEDICARE

## 2021-10-28 ENCOUNTER — TELEPHONE (OUTPATIENT)
Dept: SURGERY | Age: 71
End: 2021-10-28

## 2021-10-28 ENCOUNTER — APPOINTMENT (OUTPATIENT)
Dept: CT IMAGING | Age: 71
DRG: 189 | End: 2021-10-28
Payer: MEDICARE

## 2021-10-28 VITALS
HEART RATE: 60 BPM | DIASTOLIC BLOOD PRESSURE: 60 MMHG | WEIGHT: 248 LBS | BODY MASS INDEX: 33.59 KG/M2 | SYSTOLIC BLOOD PRESSURE: 140 MMHG | HEIGHT: 72 IN | OXYGEN SATURATION: 92 %

## 2021-10-28 DIAGNOSIS — R09.02 HYPOXIA: Primary | ICD-10-CM

## 2021-10-28 DIAGNOSIS — I96 WET GANGRENE (HCC): ICD-10-CM

## 2021-10-28 DIAGNOSIS — R09.02 HYPOXIA: ICD-10-CM

## 2021-10-28 DIAGNOSIS — M79.672 PAIN IN BOTH FEET: Primary | ICD-10-CM

## 2021-10-28 DIAGNOSIS — R07.9 CHEST PAIN AT REST: ICD-10-CM

## 2021-10-28 DIAGNOSIS — M79.671 PAIN IN BOTH FEET: Primary | ICD-10-CM

## 2021-10-28 LAB
ALBUMIN SERPL-MCNC: 2.9 GM/DL (ref 3.4–5)
ALP BLD-CCNC: 117 IU/L (ref 40–129)
ALT SERPL-CCNC: 11 U/L (ref 10–40)
ANION GAP SERPL CALCULATED.3IONS-SCNC: 7 MMOL/L (ref 4–16)
AST SERPL-CCNC: 8 IU/L (ref 15–37)
BASOPHILS ABSOLUTE: 0 K/CU MM
BASOPHILS RELATIVE PERCENT: 0.5 % (ref 0–1)
BILIRUB SERPL-MCNC: 0.4 MG/DL (ref 0–1)
BUN BLDV-MCNC: 25 MG/DL (ref 6–23)
CALCIUM SERPL-MCNC: 8.1 MG/DL (ref 8.3–10.6)
CHLORIDE BLD-SCNC: 98 MMOL/L (ref 99–110)
CO2: 29 MMOL/L (ref 21–32)
CREAT SERPL-MCNC: 1.7 MG/DL (ref 0.9–1.3)
DIFFERENTIAL TYPE: ABNORMAL
EOSINOPHILS ABSOLUTE: 0.1 K/CU MM
EOSINOPHILS RELATIVE PERCENT: 1.5 % (ref 0–3)
GFR AFRICAN AMERICAN: 48 ML/MIN/1.73M2
GFR NON-AFRICAN AMERICAN: 40 ML/MIN/1.73M2
GLUCOSE BLD-MCNC: 372 MG/DL (ref 70–99)
HCT VFR BLD CALC: 30.7 % (ref 42–52)
HEMOGLOBIN: 9.6 GM/DL (ref 13.5–18)
IMMATURE NEUTROPHIL %: 0.3 % (ref 0–0.43)
LACTATE: 1 MMOL/L (ref 0.4–2)
LYMPHOCYTES ABSOLUTE: 1.5 K/CU MM
LYMPHOCYTES RELATIVE PERCENT: 23.3 % (ref 24–44)
MCH RBC QN AUTO: 28.1 PG (ref 27–31)
MCHC RBC AUTO-ENTMCNC: 31.3 % (ref 32–36)
MCV RBC AUTO: 89.8 FL (ref 78–100)
MONOCYTES ABSOLUTE: 0.5 K/CU MM
MONOCYTES RELATIVE PERCENT: 6.8 % (ref 0–4)
NUCLEATED RBC %: 0 %
PDW BLD-RTO: 15.4 % (ref 11.7–14.9)
PLATELET # BLD: 198 K/CU MM (ref 140–440)
PMV BLD AUTO: 10 FL (ref 7.5–11.1)
POTASSIUM SERPL-SCNC: 5.4 MMOL/L (ref 3.5–5.1)
PRO-BNP: 1743 PG/ML
RBC # BLD: 3.42 M/CU MM (ref 4.6–6.2)
SARS-COV-2, NAAT: NOT DETECTED
SEGMENTED NEUTROPHILS ABSOLUTE COUNT: 4.5 K/CU MM
SEGMENTED NEUTROPHILS RELATIVE PERCENT: 67.6 % (ref 36–66)
SODIUM BLD-SCNC: 134 MMOL/L (ref 135–145)
SOURCE: NORMAL
TOTAL IMMATURE NEUTOROPHIL: 0.02 K/CU MM
TOTAL NUCLEATED RBC: 0 K/CU MM
TOTAL PROTEIN: 6.6 GM/DL (ref 6.4–8.2)
TROPONIN T: 0.02 NG/ML
WBC # BLD: 6.6 K/CU MM (ref 4–10.5)

## 2021-10-28 PROCEDURE — 4040F PNEUMOC VAC/ADMIN/RCVD: CPT | Performed by: PHYSICIAN ASSISTANT

## 2021-10-28 PROCEDURE — 84484 ASSAY OF TROPONIN QUANT: CPT

## 2021-10-28 PROCEDURE — 73630 X-RAY EXAM OF FOOT: CPT

## 2021-10-28 PROCEDURE — 36415 COLL VENOUS BLD VENIPUNCTURE: CPT

## 2021-10-28 PROCEDURE — 3017F COLORECTAL CA SCREEN DOC REV: CPT | Performed by: PHYSICIAN ASSISTANT

## 2021-10-28 PROCEDURE — 83605 ASSAY OF LACTIC ACID: CPT

## 2021-10-28 PROCEDURE — G8427 DOCREV CUR MEDS BY ELIG CLIN: HCPCS | Performed by: PHYSICIAN ASSISTANT

## 2021-10-28 PROCEDURE — 71045 X-RAY EXAM CHEST 1 VIEW: CPT

## 2021-10-28 PROCEDURE — 93005 ELECTROCARDIOGRAM TRACING: CPT | Performed by: PHYSICIAN ASSISTANT

## 2021-10-28 PROCEDURE — G8484 FLU IMMUNIZE NO ADMIN: HCPCS | Performed by: PHYSICIAN ASSISTANT

## 2021-10-28 PROCEDURE — 99213 OFFICE O/P EST LOW 20 MIN: CPT | Performed by: PHYSICIAN ASSISTANT

## 2021-10-28 PROCEDURE — 51798 US URINE CAPACITY MEASURE: CPT

## 2021-10-28 PROCEDURE — G8417 CALC BMI ABV UP PARAM F/U: HCPCS | Performed by: PHYSICIAN ASSISTANT

## 2021-10-28 PROCEDURE — 73700 CT LOWER EXTREMITY W/O DYE: CPT

## 2021-10-28 PROCEDURE — 87635 SARS-COV-2 COVID-19 AMP PRB: CPT

## 2021-10-28 PROCEDURE — 80053 COMPREHEN METABOLIC PANEL: CPT

## 2021-10-28 PROCEDURE — 6360000002 HC RX W HCPCS: Performed by: PHYSICIAN ASSISTANT

## 2021-10-28 PROCEDURE — 96374 THER/PROPH/DIAG INJ IV PUSH: CPT

## 2021-10-28 PROCEDURE — 1123F ACP DISCUSS/DSCN MKR DOCD: CPT | Performed by: PHYSICIAN ASSISTANT

## 2021-10-28 PROCEDURE — 99284 EMERGENCY DEPT VISIT MOD MDM: CPT

## 2021-10-28 PROCEDURE — 83880 ASSAY OF NATRIURETIC PEPTIDE: CPT

## 2021-10-28 PROCEDURE — 85025 COMPLETE CBC W/AUTO DIFF WBC: CPT

## 2021-10-28 PROCEDURE — 4004F PT TOBACCO SCREEN RCVD TLK: CPT | Performed by: PHYSICIAN ASSISTANT

## 2021-10-28 RX ORDER — MORPHINE SULFATE 2 MG/ML
4 INJECTION, SOLUTION INTRAMUSCULAR; INTRAVENOUS EVERY 30 MIN PRN
Status: DISCONTINUED | OUTPATIENT
Start: 2021-10-28 | End: 2021-10-29

## 2021-10-28 RX ADMIN — MORPHINE SULFATE 4 MG: 2 INJECTION, SOLUTION INTRAMUSCULAR; INTRAVENOUS at 23:32

## 2021-10-28 ASSESSMENT — ENCOUNTER SYMPTOMS
COLOR CHANGE: 0
EYE ITCHING: 0
BACK PAIN: 0
ANAL BLEEDING: 0
PHOTOPHOBIA: 0
CHEST TIGHTNESS: 1
CONSTIPATION: 0
SHORTNESS OF BREATH: 1
EYE REDNESS: 0
APNEA: 0
CHOKING: 0
RECTAL PAIN: 0
SORE THROAT: 0
STRIDOR: 0

## 2021-10-28 ASSESSMENT — PAIN SCALES - GENERAL: PAINLEVEL_OUTOF10: 10

## 2021-10-28 NOTE — TELEPHONE ENCOUNTER
Upon arrival into room complaining of SOB, SpO2 at 82%. When he stopped talking increased to 86%. Applied O2 at 2L via Nasal [place]. Sats devonte to 88-89%. Increased O2 to 3L via [place]. Sats increased to 91%. Was complaining of CP with radiation into left arm, Dizziness, Fatigue, and ABD Pain. Called EMS for transfer to ER. Upon EMS arrival SpO2 increased to 95%. Transferred to EMS for transfer. [facility] entered into system for transfer. [facility] # [id number].

## 2021-10-28 NOTE — Clinical Note
Patient Class: Inpatient [101]   REQUIRED: Diagnosis: Acute on chronic respiratory failure with hypoxemia [facility] [[id number]]   Estimated Length of Stay: Estimated stay of more than 2 midnights   Admitting Provider: Kae Kowalski [[id number]]

## 2021-10-28 NOTE — ED PROVIDER NOTES
Emergency Department Encounter  Location: [address] [place] [address]    Patient: Timmy Walden  MRN: [id number]  : 1950  Date of evaluation: 10/28/2021  ED Provider: Pricilla Camacho, [id number]  Timmy Walden was checked out to me by WARD Shay. Please see his/her initial documentation for details of the patient's initial ED presentation, physical exam and completed studies. In brief, Timmy Walden is a 70 y.o. male that presented to the emergency department for leg swelling and pain. Pt has hx od chronic LE edema bilat. As well as hx of wounds on feet. He has seen general surgeon Dr. Cinthia Maloney in the past and he is concerned about the swelling and status of his feet. No f/c/n/v/d. Pt did have an episode os sob and hypoxia today but sites \"I lose my breath sometimes\" he does not seem to be very concerned about his breathing and is mainly focused on his feet. Constitutional:  Well developed, well nourished, no acute distress   HENT:  Atraumatic, moist mucus membranes  Neck/Lymphatics: supple, no JVD, no swollen nodes  Respiratory:   Nonlabored breathing. Lungs with scattered rhonchi, mild end expiratory wheeze, no retractions   Cardiovascular:  regular rate, no murmurs  GI:  Soft, nontender, normal bowel sounds  Musculoskeletal: Bilateral lower extremity edema. Dry, flaking skin, chronic wounds noted to bilateral feet small amount of drainage. Integument: Chronic, ulcerated wounds to bilateral feet, status post multiple toe amputations. Neurologic:  Alert & oriented, no slurred speech  Psych: Pleasant affect, no hallucinations       I have reviewed and interpreted all of the currently available lab results and diagnostics from this visit:  No results found for this visit on 10/28/21.   XR FOOT LEFT (MIN 3 VIEWS)    Result Date: 10/28/2021  EXAMINATION: THREE XRAY VIEWS OF THE LEFT FOOT 10/28/2021 5:34 pm COMPARISON: 2021 HISTORY: ORDERING SYSTEM PROVIDED HISTORY: wounds TECHNOLOGIST PROVIDED HISTORY: Reason for exam:->wounds Reason for Exam: wounds FINDINGS: There is no evidence of acute fracture. There is normal alignment of the tarsometatarsal joints. No acute joint abnormality. No focal osseous lesion. No focal soft tissue abnormality. Diffuse osteopenia. No acute osseous abnormality. XR FOOT RIGHT (MIN 3 VIEWS)    Result Date: 10/28/2021  EXAMINATION: THREE XRAY VIEWS OF THE RIGHT FOOT 10/28/2021 4:34 pm COMPARISON: 06/17/2021 HISTORY: ORDERING SYSTEM PROVIDED HISTORY: wounds TECHNOLOGIST PROVIDED HISTORY: Reason for exam:->wounds Reason for Exam: wounds FINDINGS: Possible cortical step-off of the dorsal aspect of 1 of the metatarsals seen only on the lateral radiograph. There is normal alignment of the tarsometatarsal joints. No acute joint abnormality. No focal osseous lesion. Similar soft tissue swelling. Diffuse osteopenia. Possible cortical step-off of the dorsal aspect of 1 of the metatarsal seen only on the lateral radiograph which may reflect fracture. Recommend further evaluation with cross-sectional imaging. XR CHEST PORTABLE    Result Date: 10/28/2021  EXAMINATION: ONE XRAY VIEW OF THE CHEST 10/28/2021 5:34 pm COMPARISON: 06/17/2021 and CT abdomen/pelvis 03/11/2021 HISTORY: ORDERING SYSTEM PROVIDED HISTORY: Chest pain TECHNOLOGIST PROVIDED HISTORY: Reason for exam:->Chest pain Reason for Exam: Chest pain FINDINGS: The heart is mildly enlarged and unchanged. There is a dual lead left subclavian AICD, unchanged. The lungs are clear. No pneumothorax or pleural fluid. The posterior left 11th rib is sclerotic. No acute cardiopulmonary disease. Stable mild cardiomegaly. Unchanged AICD. Sclerotic posterior left 11th rib. This finding appears new compared with 03/11/2021. Blastic metastatic disease is a consideration. Follow-up left rib x-rays may be helpful to exclude overlapping shadows.        Final ED Course and MDM: In brief, Ileana Dancer is a 70 y.o. male whose care was signed out to me by the outgoing provider. In brief, presents today to the emergency department. Chronic wounds on her feet. Does appear to have edema. Have low suspicion of DVT. Imaging does reveal no acute osseous abnormalities of the feet. Follow-up CT scan of the right foot is negative. Chest x-ray does reveal stable mild cardiomegaly. Chest x-ray reveals no acute processes. However patient does remain hypoxic. Require mission to the hospital.  Likely will require seeing podiatry. As well. On-call physician Was consulted regarding patient. After thorough discussion regarding patient's history, physical exam.  laboratory values, radiographic evidence (if applicable  theymyself as well as my attending physician agreed Given the patient's presenting concerns, medical history and clinical findings, the patient will be admitted at this time to undergo further evaluation and disposition. . During patient's entire stay in the ED patient remained stable and comfortable. Analgesia is well-controlled. Patient will be admitted for all information regarding ongoing management and care of patient please see note of Admitting physician. All EKG interpretations are performed by Attending physician      ED Medication Orders (From admission, onward)    None          Final Impression      1. Pain in both feet    2.  Hypoxia        DISPOSITION       (Please note that portions of this note may have been completed with a voice recognition program. Efforts were made to edit the dictations but occasionally words are mis-transcribed.)    [id number], PAVladimirC  [address] [place] [address] , [address]  11/07/21 1928

## 2021-10-28 NOTE — ED PROVIDER NOTES
eMERGENCY dEPARTMENT eNCOUnter      PCP: Sue Watson    CHIEF COMPLAINT    Chief Complaint   Patient presents with    Wound Infection     wound infection on bilateral feet       HPI    Eddie Amaya is a 70 y.o. male with past medical history of chronic wounds, CKD, CHF, CAD, cardiomyopathy, hypertension, hyperlipidemia who presents via EMS after being found to be hypoxic while at an appointment with Dr. Margareth Ramesh to evaluate foot wounds. Patient states that he was feeling generally unwell this morning, states that he was feeling short of breath and fatigued. At his appointment, his oxygenation dropped to the eighties and required 4 L of O2. On arrival to the ED, he states that he is feeling improved, maintaining mid nineties oxygenation on room air. He denies associated chest pain. Does endorse a mild, nonproductive cough recently. No fevers or chills. No recent nausea, vomiting, diarrhea. He does have ongoing bilateral lower extremity edema and neuropathy, states this is not significantly changed. No history of DVT/PE. Patient typically requires O2 at night. REVIEW OF SYSTEMS    Constitutional:  Denies fever, chills, weight loss or weakness   HENT:  Denies sore throat or ear pain   Cardiovascular:  No chest pain. No palpitations, No syncope  Respiratory:  See HPI.    GI:  Denies abdominal pain, nausea, vomiting, or diarrhea  :  Denies any urinary symptoms   Musculoskeletal:  Denies back pain  Skin:  Ulcerated wounds to bilateral feet  Neurologic:  Denies headache, focal weakness or sensory changes   Endocrine:  Denies polyuria or polydypsia   Lymphatic:  Denies swollen glands     All other review of systems are negative  See HPI and nursing notes for additional information       PAST MEDICAL & SURGICAL HISTORY    Past Medical History:   Diagnosis Date    Acid reflux     Acute MI ([facility] [address] 2004, 2008    Arthritis     Back    Broken teeth     Upper Front    [facility]on[facility]     per [facility]    CHF (congestive heart failure) (HCC)     Chronic back pain     Chronic kidney disease     STAGE 3 KIDNEY FAILURE- \"from my diabetes- do not follow with any one- have seen Dr Jluia Owens in the past\"    Diabetes mellitus ([facility] [address] Dx 1965    per old chart pt has been diabetic since age 13    Diabetic neuropathy ([facility] [address]     \"in my feet\"    H/O cardiovascular stress test 08/25/2016    H/O Doppler ultrasound 09/27/2018    Moderate disease of the right lower extremity with an JALEN of 0.72.   Moderate to severe disease of the left lower extremity with an JALEN of 0.55.   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)      Past Surgical History:   Procedure Laterality Date    CARDIAC CATHETERIZATION      per old chart done 10/2014    CARDIAC CATHETERIZATION  07/14/2017    with angiography of leg    CARDIAC CATHETERIZATION  11/20/2018    PATENT STENTS OF ALL THREE MAJOR VESSELS    CARDIAC DEFIBRILLATOR PLACEMENT  06/04/2010    [employer] [address] Defibrillator Implanted    COLECTOMY Right 08/26/2016    laparascopic; robotic assisted    COLONOSCOPY  08/04/2016    CORONARY ANGIOPLASTY      \"15 Heart Stents\"    CORONARY ANGIOPLASTY WITH STENT PLACEMENT      per old chart had angio with stent to circumflex and obtuse marginal artery at [facility] 5/2010( old chart also gives hx of stent placement done 2000,2004 and 2005)    DENTAL SURGERY      Teeth Extracted In Past    IR TUNNELED CATHETER PLACEMENT GREATER THAN 5 YEARS  6/14/2021    IR TUNNELED CATHETER PLACEMENT GREATER THAN 5 YEARS 6/14/2021 [facility] SPECIAL PROCEDURES    PACEMAKER PLACEMENT  06/04/2010    [employer] [address] Defibrillator Implanted    TOE AMPUTATION Right 09/12/2017    Rt 3rd toe    TOE AMPUTATION Right 01/09/2018     Right 5th toe amputation and Toenails trimmed left 2,3,4 and 5th toes    TOE AMPUTATION Left 12/26/2020    LEFT GREAT TOE AMPUTATION performed by Jud Padilla MD at [facility]      per old chart had balloon angioplasty right superfical femoral artery,right popliteal artery,,right ant.tibial artery, right tibioperoneal trunk, and right post.tibial artery wna stent placement right popliteal artery and superfical femoral artery 7/2012       CURRENT MEDICATIONS    Current Outpatient Rx   Medication Sig Dispense Refill    amLODIPine (NORVASC) 10 MG tablet Take 1 tablet by mouth daily 30 tablet 5    doxazosin (CARDURA) 4 MG tablet Take 1 tablet by mouth 2 times daily 60 tablet 3    metaxalone (SKELAXIN) 800 MG tablet Take 800 mg by mouth 3 times daily      gabapentin (NEURONTIN) 300 MG capsule Take 1 capsule by mouth 3 times daily for 90 days.  90 capsule 3    losartan (COZAAR) 100 MG tablet       SPIRIVA HANDIHALER 18 MCG inhalation capsule       insulin lispro, 1 Unit Dial, (HUMALOG KWIKPEN) 100 UNIT/ML SOPN Inject into the skin 2 times daily Sliding scale dose      chlorthalidone (HYGROTEN) 50 MG tablet Take 1 tablet by mouth daily 30 tablet 0    atorvastatin (LIPITOR) 40 MG tablet Take 1 tablet by mouth nightly 30 tablet 3    carvedilol (COREG) 3.125 MG tablet Take 1 tablet by mouth 2 times daily 60 tablet 3    albuterol sulfate HFA (VENTOLIN HFA) 108 (90 Base) MCG/ACT inhaler Inhale 2 puffs into the lungs every 4 hours as needed for Wheezing 1 Inhaler 3    Calcium Alginate (ALGICELL CALCIUM DRESSING 4\"X8) MISC Apply 1 Device topically daily 30 each 3    PROMOGRAN COREY WOUND DRESSING MATRIX Apply topically Apply to left daily and cover with gauze 1 Package 3    clopidogrel (PLAVIX) 75 MG tablet Take 1 tablet by mouth daily 30 tablet 11       ALLERGIES    Allergies   Allergen Reactions    Pcn [Penicillins] Hives    Fentanyl Itching       SOCIAL & FAMILY HISTORY    Social History     Socioeconomic History    Marital status:      Spouse name: None    Number of children: None    Years of education: None    Highest education level: None   Occupational History    None   Tobacco Use    Smoking status: Current Some Day Smoker     Packs/day: 0.25     Years: 36.00     Pack years: 9.00     Types: Cigars     Start date: 1/1/1980    Smokeless tobacco: Never Used    Tobacco comment: Client states he has stopped smoking   Vaping Use    Vaping Use: Never used   Substance and Sexual Activity    Alcohol use: No    Drug use: Yes     Types: Marijuana    Sexual activity: Never   Other Topics Concern    None   Social History Narrative    None     Social Determinants of Health     Financial Resource Strain:     Difficulty of Paying Living Expenses:    Food Insecurity:     Worried About Running Out of Food in the Last Year:     Ran Out of Food in the Last Year:    Transportation Needs:     Lack of Transportation (Medical):      Lack of Transportation (Non-Medical):    Physical Activity:     Days of Exercise per Week:     Minutes of Exercise per Session:    Stress:     Feeling of Stress :    Social Connections:     Frequency of Communication with Friends and Family:     Frequency of Social Gatherings with Friends and Family:     Attends Mosque Services:     Active Member of Clubs or Organizations:     Attends Club or Organization Meetings:     Marital Status:    Intimate Partner Violence:     Fear of Current or Ex-Partner:     Emotionally Abused:     Physically Abused:     Sexually Abused:      Family History   Problem Relation Age of Onset    Diabetes Mother     Stroke Mother     High Blood Pressure Mother     Vision Loss Mother     Cancer Father         Prostate Cancer    Diabetes Sister     Neuropathy Sister     Other Sister         \"Breathing Problems\"    Heart Disease Sister     Early Death Sister 62        Heart Complications    Cancer Brother         \"Stomach Cancer\"    High Blood Pressure Brother     Diabetes Brother     Heart Disease Brother     High Blood Pressure Brother     Cancer Son         \"Testicle Cancer\"       PHYSICAL EXAM    VITAL SIGNS: BP 97/77   Pulse 91   Temp 97.6 °F (36.4 °C) (Oral)   Resp 16   Ht 6' (1.829 m)   Wt 250 lb (113.4 kg)   SpO2 94%   BMI 33.91 kg/m²    Constitutional:  Well developed, well nourished, no acute distress   HENT:  Atraumatic, moist mucus membranes  Neck/Lymphatics: supple, no JVD, no swollen nodes  Respiratory:   Nonlabored breathing. Lungs with scattered rhonchi, mild end expiratory wheeze, no retractions   Cardiovascular:  regular rate, no murmurs  GI:  Soft, nontender, normal bowel sounds  Musculoskeletal: Bilateral lower extremity edema. Dry, flaking skin, chronic wounds noted to bilateral feet small amount of drainage. Integument: Chronic, ulcerated wounds to bilateral feet, status post multiple toe amputations. Neurologic:  Alert & oriented, no slurred speech  Psych: Pleasant affect, no hallucinations      EKG Interpretation  Please see ED physician's note for EKG interpretation    LABS:  No results found for this visit on 10/28/21.         RADIOLOGY/PROCEDURES    CXR:   XR CHEST PORTABLE    (Results Pending)   XR FOOT LEFT (MIN 3 VIEWS)    (Results Pending)   XR FOOT RIGHT (MIN 3 VIEWS)    (Results Pending)               ED COURSE & MEDICAL DECISION MAKING       Vital signs and nursing notes reviewed during ED course. I have independently evaluated this patient . Supervising MD present in the Emergency Department, available for consultation, throughout entirety of  patient care. Patient presents as above following episode of hypoxia shortness of breath while at Dr. Rizzo Husbands office for wound follow up. On arrival to the ED, he is hemodynamically stable, afebrile, oxygenating in mid nineties on room air. He states that he feels well now. Will plan on evaluating patient with labs, imaging, EKG. He does have associated cough so will evaluate for possible infectious etiology. Also has significant cardiac history so we will interrogate his pacemaker as well.     ________________________________________________________________________    6:00 PM EDT I have signed out Mariana Duque Emergency Department care to [facility]. We discussed the history, physical exam, completed/pending test results (if obtained) and current treatment plan. At time of patient signout labs, imaging pending.    ________________________________________________________________________          All pertinent Lab data and radiographic results reviewed with patient at bedside. The patient and/or the family were informed of the results of any tests/labs/imaging, the treatment plan, and time was allotted to answer questions. Vitals:    10/28/21 1703   BP: 97/77   Pulse: 91   Resp: 16   Temp: 97.6 °F (36.4 °C)   TempSrc: Oral   SpO2: 94%   Weight: 250 lb (113.4 kg)   Height: 6' (1.829 m)             Comment: Please note this report has been produced using speech recognition software and may contain errors related to that system including errors in grammar, punctuation, and spelling, as well as words and phrases that may be inappropriate.  If there are any questions or concerns please feel free to contact the dictating provider for clarification.                         WARD Gallardo  10/28/21 1829

## 2021-10-29 ENCOUNTER — APPOINTMENT (OUTPATIENT)
Dept: GENERAL RADIOLOGY | Age: 71
DRG: 189 | End: 2021-10-29
Payer: MEDICARE

## 2021-10-29 PROBLEM — J96.21 ACUTE ON CHRONIC RESPIRATORY FAILURE WITH HYPOXEMIA (HCC): Status: ACTIVE | Noted: 2021-10-29

## 2021-10-29 LAB
ADENOVIRUS DETECTION BY PCR: NOT DETECTED
ALBUMIN SERPL-MCNC: 3.1 GM/DL (ref 3.4–5)
ALBUMIN SERPL-MCNC: 3.1 GM/DL (ref 3.4–5)
ALP BLD-CCNC: 105 IU/L (ref 40–129)
ALT SERPL-CCNC: 10 U/L (ref 10–40)
ANION GAP SERPL CALCULATED.3IONS-SCNC: 8 MMOL/L (ref 4–16)
AST SERPL-CCNC: 8 IU/L (ref 15–37)
BACTERIA: ABNORMAL /HPF
BILIRUB SERPL-MCNC: 0.4 MG/DL (ref 0–1)
BILIRUBIN DIRECT: 0.2 MG/DL (ref 0–0.3)
BILIRUBIN URINE: NEGATIVE MG/DL
BILIRUBIN, INDIRECT: 0.2 MG/DL (ref 0–0.7)
BLOOD, URINE: ABNORMAL
BORDETELLA PARAPERTUSSIS BY PCR: NOT DETECTED
BORDETELLA PERTUSSIS PCR: NOT DETECTED
BUN BLDV-MCNC: 26 MG/DL (ref 6–23)
CALCIUM SERPL-MCNC: 8.1 MG/DL (ref 8.3–10.6)
CHLAMYDOPHILA PNEUMONIA PCR: NOT DETECTED
CHLORIDE BLD-SCNC: 100 MMOL/L (ref 99–110)
CHP ED QC CHECK: NORMAL
CHP ED QC CHECK: NORMAL
CLARITY: CLEAR
CO2: 27 MMOL/L (ref 21–32)
COLOR: YELLOW
CORONAVIRUS 229E PCR: NOT DETECTED
CORONAVIRUS HKU1 PCR: NOT DETECTED
CORONAVIRUS NL63 PCR: NOT DETECTED
CORONAVIRUS OC43 PCR: NOT DETECTED
CREAT SERPL-MCNC: 1.7 MG/DL (ref 0.9–1.3)
CREATININE URINE: 114.2 MG/DL (ref 39–259)
EKG ATRIAL RATE: 61 BPM
EKG DIAGNOSIS: NORMAL
EKG P-R INTERVAL: 276 MS
EKG Q-T INTERVAL: 448 MS
EKG QRS DURATION: 96 MS
EKG QTC CALCULATION (BAZETT): 450 MS
EKG R AXIS: 27 DEGREES
EKG T AXIS: 49 DEGREES
EKG VENTRICULAR RATE: 61 BPM
GFR AFRICAN AMERICAN: 48 ML/MIN/1.73M2
GFR NON-AFRICAN AMERICAN: 40 ML/MIN/1.73M2
GLUCOSE BLD-MCNC: 205 MG/DL (ref 70–99)
GLUCOSE BLD-MCNC: 232 MG/DL
GLUCOSE BLD-MCNC: 232 MG/DL (ref 70–99)
GLUCOSE BLD-MCNC: 324 MG/DL
GLUCOSE BLD-MCNC: 324 MG/DL (ref 70–99)
GLUCOSE BLD-MCNC: 326 MG/DL (ref 70–99)
GLUCOSE, URINE: 50 MG/DL
HCT VFR BLD CALC: 30.6 % (ref 42–52)
HEMOGLOBIN: 9.4 GM/DL (ref 13.5–18)
HUMAN METAPNEUMOVIRUS PCR: NOT DETECTED
INFLUENZA A BY PCR: NOT DETECTED
INFLUENZA A H1 (2009) PCR: NOT DETECTED
INFLUENZA A H1 PANDEMIC PCR: NOT DETECTED
INFLUENZA A H3 PCR: NOT DETECTED
INFLUENZA B BY PCR: NOT DETECTED
KETONES, URINE: NEGATIVE MG/DL
LEUKOCYTE ESTERASE, URINE: NEGATIVE
MCH RBC QN AUTO: 27.5 PG (ref 27–31)
MCHC RBC AUTO-ENTMCNC: 30.7 % (ref 32–36)
MCV RBC AUTO: 89.5 FL (ref 78–100)
MUCUS: ABNORMAL HPF
MYCOPLASMA PNEUMONIAE PCR: NOT DETECTED
NITRITE URINE, QUANTITATIVE: NEGATIVE
PARAINFLUENZA 1 PCR: NOT DETECTED
PARAINFLUENZA 2 PCR: NOT DETECTED
PARAINFLUENZA 3 PCR: NOT DETECTED
PARAINFLUENZA 4 PCR: NOT DETECTED
PDW BLD-RTO: 15.4 % (ref 11.7–14.9)
PH, URINE: 6 (ref 5–8)
PHOSPHORUS: 3.3 MG/DL (ref 2.5–4.9)
PLATELET # BLD: 204 K/CU MM (ref 140–440)
PMV BLD AUTO: 10.1 FL (ref 7.5–11.1)
POTASSIUM SERPL-SCNC: 5.3 MMOL/L (ref 3.5–5.1)
PROT/CREAT RATIO, UR: 0.6
PROTEIN UA: 100 MG/DL
RBC # BLD: 3.42 M/CU MM (ref 4.6–6.2)
RBC URINE: 13 /HPF (ref 0–3)
RHINOVIRUS ENTEROVIRUS PCR: NOT DETECTED
RSV PCR: NOT DETECTED
SARS-COV-2: NOT DETECTED
SODIUM BLD-SCNC: 135 MMOL/L (ref 135–145)
SODIUM URINE: 62 MMOL/L (ref 35–167)
SPECIFIC GRAVITY UA: 1.01 (ref 1–1.03)
SQUAMOUS EPITHELIAL: <1 /HPF
TOTAL PROTEIN: 6.3 GM/DL (ref 6.4–8.2)
TRICHOMONAS: ABNORMAL /HPF
TROPONIN T: 0.02 NG/ML
TROPONIN T: 0.02 NG/ML
URINE TOTAL PROTEIN: 67.6 MG/DL
UROBILINOGEN, URINE: 2 MG/DL (ref 0.2–1)
WBC # BLD: 7.4 K/CU MM (ref 4–10.5)
WBC UA: 1 /HPF (ref 0–2)

## 2021-10-29 PROCEDURE — 99232 SBSQ HOSP IP/OBS MODERATE 35: CPT | Performed by: SURGERY

## 2021-10-29 PROCEDURE — 51798 US URINE CAPACITY MEASURE: CPT

## 2021-10-29 PROCEDURE — 85027 COMPLETE CBC AUTOMATED: CPT

## 2021-10-29 PROCEDURE — 84484 ASSAY OF TROPONIN QUANT: CPT

## 2021-10-29 PROCEDURE — 2580000003 HC RX 258: Performed by: STUDENT IN AN ORGANIZED HEALTH CARE EDUCATION/TRAINING PROGRAM

## 2021-10-29 PROCEDURE — 84156 ASSAY OF PROTEIN URINE: CPT

## 2021-10-29 PROCEDURE — 94761 N-INVAS EAR/PLS OXIMETRY MLT: CPT

## 2021-10-29 PROCEDURE — 80053 COMPREHEN METABOLIC PANEL: CPT

## 2021-10-29 PROCEDURE — 6370000000 HC RX 637 (ALT 250 FOR IP): Performed by: STUDENT IN AN ORGANIZED HEALTH CARE EDUCATION/TRAINING PROGRAM

## 2021-10-29 PROCEDURE — 6360000002 HC RX W HCPCS: Performed by: STUDENT IN AN ORGANIZED HEALTH CARE EDUCATION/TRAINING PROGRAM

## 2021-10-29 PROCEDURE — 82248 BILIRUBIN DIRECT: CPT

## 2021-10-29 PROCEDURE — 6370000000 HC RX 637 (ALT 250 FOR IP): Performed by: INTERNAL MEDICINE

## 2021-10-29 PROCEDURE — 93010 ELECTROCARDIOGRAM REPORT: CPT | Performed by: INTERNAL MEDICINE

## 2021-10-29 PROCEDURE — 84100 ASSAY OF PHOSPHORUS: CPT

## 2021-10-29 PROCEDURE — 82962 GLUCOSE BLOOD TEST: CPT

## 2021-10-29 PROCEDURE — 81001 URINALYSIS AUTO W/SCOPE: CPT

## 2021-10-29 PROCEDURE — 0202U NFCT DS 22 TRGT SARS-COV-2: CPT

## 2021-10-29 PROCEDURE — 1200000000 HC SEMI PRIVATE

## 2021-10-29 PROCEDURE — 84300 ASSAY OF URINE SODIUM: CPT

## 2021-10-29 PROCEDURE — 82570 ASSAY OF URINE CREATININE: CPT

## 2021-10-29 RX ORDER — DOXAZOSIN MESYLATE 4 MG/1
4 TABLET ORAL 2 TIMES DAILY
Status: DISCONTINUED | OUTPATIENT
Start: 2021-10-29 | End: 2021-10-30 | Stop reason: HOSPADM

## 2021-10-29 RX ORDER — SODIUM POLYSTYRENE SULFONATE 15 G/60ML
15 SUSPENSION ORAL; RECTAL ONCE
Status: COMPLETED | OUTPATIENT
Start: 2021-10-29 | End: 2021-10-29

## 2021-10-29 RX ORDER — ALBUTEROL SULFATE 90 UG/1
2 AEROSOL, METERED RESPIRATORY (INHALATION) 2 TIMES DAILY
Status: DISCONTINUED | OUTPATIENT
Start: 2021-10-29 | End: 2021-10-30 | Stop reason: HOSPADM

## 2021-10-29 RX ORDER — HEPARIN SODIUM 5000 [USP'U]/ML
5000 INJECTION, SOLUTION INTRAVENOUS; SUBCUTANEOUS EVERY 8 HOURS SCHEDULED
Status: DISCONTINUED | OUTPATIENT
Start: 2021-10-29 | End: 2021-10-30 | Stop reason: HOSPADM

## 2021-10-29 RX ORDER — SODIUM CHLORIDE 9 MG/ML
25 INJECTION, SOLUTION INTRAVENOUS PRN
Status: DISCONTINUED | OUTPATIENT
Start: 2021-10-29 | End: 2021-10-30 | Stop reason: HOSPADM

## 2021-10-29 RX ORDER — CARVEDILOL 6.25 MG/1
3.12 TABLET ORAL 2 TIMES DAILY
Status: DISCONTINUED | OUTPATIENT
Start: 2021-10-29 | End: 2021-10-30 | Stop reason: HOSPADM

## 2021-10-29 RX ORDER — GABAPENTIN 300 MG/1
300 CAPSULE ORAL 3 TIMES DAILY
Status: DISCONTINUED | OUTPATIENT
Start: 2021-10-29 | End: 2021-10-30 | Stop reason: HOSPADM

## 2021-10-29 RX ORDER — IPRATROPIUM BROMIDE AND ALBUTEROL SULFATE 2.5; .5 MG/3ML; MG/3ML
1 SOLUTION RESPIRATORY (INHALATION) EVERY 4 HOURS PRN
Status: DISCONTINUED | OUTPATIENT
Start: 2021-10-29 | End: 2021-10-29

## 2021-10-29 RX ORDER — CLOPIDOGREL BISULFATE 75 MG/1
75 TABLET ORAL DAILY
Status: DISCONTINUED | OUTPATIENT
Start: 2021-10-29 | End: 2021-10-30 | Stop reason: HOSPADM

## 2021-10-29 RX ORDER — DEXTROSE MONOHYDRATE 50 MG/ML
100 INJECTION, SOLUTION INTRAVENOUS PRN
Status: DISCONTINUED | OUTPATIENT
Start: 2021-10-29 | End: 2021-10-30 | Stop reason: HOSPADM

## 2021-10-29 RX ORDER — DEXTROSE MONOHYDRATE 25 G/50ML
12.5 INJECTION, SOLUTION INTRAVENOUS PRN
Status: DISCONTINUED | OUTPATIENT
Start: 2021-10-29 | End: 2021-10-30 | Stop reason: HOSPADM

## 2021-10-29 RX ORDER — CHLORTHALIDONE 25 MG/1
50 TABLET ORAL DAILY
Status: DISCONTINUED | OUTPATIENT
Start: 2021-10-29 | End: 2021-10-30 | Stop reason: HOSPADM

## 2021-10-29 RX ORDER — ACETAMINOPHEN 325 MG/1
650 TABLET ORAL EVERY 6 HOURS PRN
Status: DISCONTINUED | OUTPATIENT
Start: 2021-10-29 | End: 2021-10-30 | Stop reason: HOSPADM

## 2021-10-29 RX ORDER — ACETAMINOPHEN 650 MG/1
650 SUPPOSITORY RECTAL EVERY 6 HOURS PRN
Status: DISCONTINUED | OUTPATIENT
Start: 2021-10-29 | End: 2021-10-30 | Stop reason: HOSPADM

## 2021-10-29 RX ORDER — SODIUM CHLORIDE 0.9 % (FLUSH) 0.9 %
5-40 SYRINGE (ML) INJECTION PRN
Status: DISCONTINUED | OUTPATIENT
Start: 2021-10-29 | End: 2021-10-30 | Stop reason: HOSPADM

## 2021-10-29 RX ORDER — ALBUTEROL SULFATE 2.5 MG/3ML
2.5 SOLUTION RESPIRATORY (INHALATION)
Status: DISCONTINUED | OUTPATIENT
Start: 2021-10-29 | End: 2021-10-30 | Stop reason: HOSPADM

## 2021-10-29 RX ORDER — NICOTINE POLACRILEX 4 MG
15 LOZENGE BUCCAL PRN
Status: DISCONTINUED | OUTPATIENT
Start: 2021-10-29 | End: 2021-10-30 | Stop reason: HOSPADM

## 2021-10-29 RX ORDER — POLYETHYLENE GLYCOL 3350 17 G/17G
17 POWDER, FOR SOLUTION ORAL DAILY PRN
Status: DISCONTINUED | OUTPATIENT
Start: 2021-10-29 | End: 2021-10-30 | Stop reason: HOSPADM

## 2021-10-29 RX ORDER — IPRATROPIUM BROMIDE AND ALBUTEROL SULFATE 2.5; .5 MG/3ML; MG/3ML
1 SOLUTION RESPIRATORY (INHALATION) 4 TIMES DAILY
Status: DISCONTINUED | OUTPATIENT
Start: 2021-10-29 | End: 2021-10-29

## 2021-10-29 RX ORDER — PROMETHAZINE HYDROCHLORIDE 25 MG/1
12.5 TABLET ORAL EVERY 6 HOURS PRN
Status: DISCONTINUED | OUTPATIENT
Start: 2021-10-29 | End: 2021-10-30 | Stop reason: HOSPADM

## 2021-10-29 RX ORDER — MORPHINE SULFATE 2 MG/ML
2 INJECTION, SOLUTION INTRAMUSCULAR; INTRAVENOUS EVERY 4 HOURS PRN
Status: DISCONTINUED | OUTPATIENT
Start: 2021-10-29 | End: 2021-10-30 | Stop reason: HOSPADM

## 2021-10-29 RX ORDER — SODIUM CHLORIDE 0.9 % (FLUSH) 0.9 %
5-40 SYRINGE (ML) INJECTION EVERY 12 HOURS SCHEDULED
Status: DISCONTINUED | OUTPATIENT
Start: 2021-10-29 | End: 2021-10-30 | Stop reason: HOSPADM

## 2021-10-29 RX ORDER — LOSARTAN POTASSIUM 50 MG/1
100 TABLET ORAL DAILY
Status: DISCONTINUED | OUTPATIENT
Start: 2021-10-29 | End: 2021-10-29

## 2021-10-29 RX ADMIN — HEPARIN SODIUM 5000 UNITS: 5000 INJECTION INTRAVENOUS; SUBCUTANEOUS at 14:50

## 2021-10-29 RX ADMIN — GABAPENTIN 300 MG: 300 CAPSULE ORAL at 06:43

## 2021-10-29 RX ADMIN — CARVEDILOL 3.12 MG: 6.25 TABLET, FILM COATED ORAL at 21:25

## 2021-10-29 RX ADMIN — MORPHINE SULFATE 2 MG: 2 INJECTION, SOLUTION INTRAMUSCULAR; INTRAVENOUS at 06:44

## 2021-10-29 RX ADMIN — GABAPENTIN 300 MG: 300 CAPSULE ORAL at 14:50

## 2021-10-29 RX ADMIN — DOXAZOSIN 4 MG: 4 TABLET ORAL at 21:25

## 2021-10-29 RX ADMIN — INSULIN LISPRO 4 UNITS: 100 INJECTION, SOLUTION INTRAVENOUS; SUBCUTANEOUS at 12:43

## 2021-10-29 RX ADMIN — ACETAMINOPHEN 650 MG: 325 TABLET ORAL at 09:20

## 2021-10-29 RX ADMIN — GABAPENTIN 300 MG: 300 CAPSULE ORAL at 21:25

## 2021-10-29 RX ADMIN — INSULIN LISPRO 8 UNITS: 100 INJECTION, SOLUTION INTRAVENOUS; SUBCUTANEOUS at 09:49

## 2021-10-29 RX ADMIN — CLOPIDOGREL BISULFATE 75 MG: 75 TABLET, FILM COATED ORAL at 09:21

## 2021-10-29 RX ADMIN — CHLORTHALIDONE 50 MG: 25 TABLET ORAL at 09:20

## 2021-10-29 RX ADMIN — SODIUM POLYSTYRENE SULFONATE 15 G: 15 SUSPENSION ORAL; RECTAL at 04:31

## 2021-10-29 RX ADMIN — MORPHINE SULFATE 2 MG: 2 INJECTION, SOLUTION INTRAMUSCULAR; INTRAVENOUS at 21:25

## 2021-10-29 RX ADMIN — SODIUM CHLORIDE, PRESERVATIVE FREE 10 ML: 5 INJECTION INTRAVENOUS at 21:25

## 2021-10-29 RX ADMIN — DOXAZOSIN 4 MG: 4 TABLET ORAL at 09:24

## 2021-10-29 RX ADMIN — SODIUM CHLORIDE, PRESERVATIVE FREE 10 ML: 5 INJECTION INTRAVENOUS at 11:35

## 2021-10-29 ASSESSMENT — ENCOUNTER SYMPTOMS
RHINORRHEA: 0
DIARRHEA: 0
VOMITING: 0
COUGH: 1
WHEEZING: 0
NAUSEA: 0
CHEST TIGHTNESS: 0
SORE THROAT: 0
BLOOD IN STOOL: 0
SHORTNESS OF BREATH: 1
ABDOMINAL PAIN: 0
COLOR CHANGE: 0

## 2021-10-29 ASSESSMENT — PAIN SCALES - GENERAL
PAINLEVEL_OUTOF10: 7
PAINLEVEL_OUTOF10: 6
PAINLEVEL_OUTOF10: 8
PAINLEVEL_OUTOF10: 9

## 2021-10-29 NOTE — ED NOTES
Pt requesting to talk to the doctor about his care plan. This nurse attempted to answer their questions. Dr. Kassidy Ríos was paged.      Josiah Peña RN  10/29/21 [id number]

## 2021-10-29 NOTE — ED NOTES
Patient desalting to 88%, placed him on 4LPM via NC. Patient improved to 90%. Patient stated he has a HX of COPD and wears oxygen at HS.      Wolfgang Parks RN  10/29/21 4020

## 2021-10-29 NOTE — ED NOTES
Patient removed two IVs within 1 minute of placement. Patient refusing IV placement at this time.      Yury Paez RN  10/29/21 8613

## 2021-10-29 NOTE — ED NOTES
Pt aware of the need for urine sample, and urinal at the bedside.      Radha Salinas RN  10/29/21 2720

## 2021-10-29 NOTE — ED NOTES
Daughter Jaz called for an update, this nurse updated marina daughter and handed the phone to the patient for further updates     Bertram Cruz RN  10/29/21 [id number]

## 2021-10-29 NOTE — ED NOTES
Bladder scan showed 531 ml the most and pt requested to attempt to pee. Urinal at the bedside.      Nora Lugo RN  10/29/21 0135

## 2021-10-29 NOTE — RT PROTOCOL NOTE
RT Inhaler-Nebulizer Bronchodilator Protocol Note    There is a bronchodilator order in the chart from a provider indicating to follow the RT Bronchodilator Protocol and there is an Initiate RT Inhaler-Nebulizer Bronchodilator Protocol order as well (see protocol at bottom of note). CXR Findings:  XR CHEST PORTABLE    Result Date: 10/28/2021  No acute cardiopulmonary disease. Stable mild cardiomegaly. Unchanged AICD. Sclerotic posterior left 11th rib. This finding appears new compared with 03/11/2021. Blastic metastatic disease is a consideration. Follow-up left rib x-rays may be helpful to exclude overlapping shadows. The findings from the last RT Protocol Assessment were as follows:   History Pulmonary Disease: Chronic pulmonary disease  Respiratory Pattern: Regular pattern and RR 12-20 bpm  Breath Sounds: Slightly diminished and/or crackles  Cough: Strong, spontaneous, non-productive  Indication for Bronchodilator Therapy: On home bronchodilators  Bronchodilator Assessment Score: 4    Aerosolized bronchodilator medication orders have been revised according to the RT Inhaler-Nebulizer Bronchodilator Protocol below. Respiratory Therapist to perform RT Therapy Protocol Assessment initially then follow the protocol. Repeat RT Therapy Protocol Assessment PRN for score 0-3 or on second treatment, BID, and PRN for scores above 3. No Indications - adjust the frequency to every 6 hours PRN wheezing or bronchospasm, if no treatments needed after 48 hours then discontinue using Per Protocol order mode. If indication present, adjust the RT bronchodilator orders based on the Bronchodilator Assessment Score as indicated below.   Use Inhaler orders unless patient has one or more of the following: on home nebulizer, not able to hold breath for 10 seconds, is not alert and oriented, cannot activate and use MDI correctly, or respiratory rate 25 breaths per minute or more, then use the equivalent nebulizer order(s) with same Frequency and PRN reasons based on the score. If a patient is on this medication at home then do not decrease Frequency below that used at home. 0-3 - enter or revise RT bronchodilator order(s) to equivalent RT Bronchodilator order with Frequency of every 4 hours PRN for wheezing or increased work of breathing using Per Protocol order mode. 4-6 - enter or revise RT Bronchodilator order(s) to two equivalent RT bronchodilator orders with one order with BID Frequency and one order with Frequency of every 4 hours PRN wheezing or increased work of breathing using Per Protocol order mode. 7-10 - enter or revise RT Bronchodilator order(s) to two equivalent RT bronchodilator orders with one order with TID Frequency and one order with Frequency of every 4 hours PRN wheezing or increased work of breathing using Per Protocol order mode. 11-13 - enter or revise RT Bronchodilator order(s) to one equivalent RT bronchodilator order with QID Frequency and an Albuterol order with Frequency of every 4 hours PRN wheezing or increased work of breathing using Per Protocol order mode. Greater than 13 - enter or revise RT Bronchodilator order(s) to one equivalent RT bronchodilator order with every 4 hours Frequency and an Albuterol order with Frequency of every 2 hours PRN wheezing or increased work of breathing using Per Protocol order mode. RT to enter RT Home Evaluation for COPD & MDI Assessment order using Per Protocol order mode.     Electronically signed by Joanne Cruz RCP on 10/29/2021 at 4:20 PM

## 2021-10-29 NOTE — CONSULTS
Patient:   Criss Hanna    Date:  10/29/21  :  1950, 70 y.o. Nephrologist: Li Huff MD  Provider: Clemente Carlos    Reason for Consult: CKD stage IIIb    Consult requested by : Dr ALBERT [facility], DO    Chief Complaint:   Hypoxia per pulse ox in the office    HISTORY OF PRESENT ILLNESS:   66-year-old male who was sent to the emergency room after being evaluated by his surgical team-with apparent hypoxia-according to the record when he went to see them his pulse ox was in the 80s-even with supplemental oxygen-which prompted them to send him to the emergency room    In the emergency room though he remained 94-96% with even on room air. Which suggest perhaps the pulse ox was malfunctioning and the surgeon's office. In any event he was hemodynamically stable in the emergency room  , And underwent several diagnostic tests which include imaging and biochemical.  Imaging study which include CT of the foot, x-ray of the left foot, right foot and chest x-ray-all were unrevealing-he does have chronic cardiomegaly, there is no imaging overt evidence of osteomyelitis. Biochemical testing showed slightly elevated potassium of 5.4-he was of course on losartan at home-creatinine was 1.7. I saw him recently in my office for an urgent visit-at that time his blood pressure was rather high-about 180/80-and he has 2+ peripheral edema-did add amlodipine as well as doxazosin to control the blood pressure. He was on losartan and chlorthalidone at that time along with beta-blocker      PAST MEDICAL HISTORY:  1.  CKD stage IIIA/B A3 with several acute kidney injuries, mainly by  creatinine criteria of course. 2.  Cardiorenal syndrome type 2 frequently transforming to type 1, but  has not been doing it since 2020.  His diabetes mellitus is  longstanding, it was not very well controlled, had significant  proteinuria. 3.  Hypertension, also was not very well controlled.   4.  Coronary artery disease. Ivory Gonzalez has had several stents long time ago by  Dr. Diania Lennox also had an ICD placement.  His EF was about 45% or so  unless there is any recent echocardiogram done. 5.  Severe atherosclerotic cardiovascular disease involving several  vascular beds, mainly the lower extremity too.  He had angiogram  intervention and toe amputation as I mentioned earlier. 6.  Chronic leg edema, which is pretty much minimal now. 7.  Probable autonomic dysfunction.  He did have some orthostatic  hypotension before, although it could be multifactorial.     PAST SURGICAL HISTORY:  1.  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 2.  Angiogram several of them. 3.  Pacemaker and ICD placement. 4.  Coronary angiogram too, had intervention in the past.     FAMILY HISTORY:  Coronary artery disease runs in the family. Denise Nance has  advanced kidney disease or is on dialysis.     SOCIAL HISTORY:  The patient is . Ivory Gonzalez was born in [facility],  where he moved to [place] for a while and back here since, I  think, in 2016. Ivory Gonzalez does have some extended family. Ivory Gonzalez lives alone at  home. Ivory Gonzalez does probably have some home healthcare. Ivory Gonzalez does have an  electrical scooter.  I am assuming that he has some social support, but  may be I am wrong, I need to double check with him.     HABITS:  He does not smoke.  No history of alcohol or illicit drug  abuse.                      REVIEW OF SYSTEMS:     All pertinent ROS neg except   No shortness of breath, no fever chills or rigor  No confusion    Current Facility-Administered Medications   Medication Dose Route Frequency Provider Last Rate Last Admin    carvedilol (COREG) tablet 3.125 mg  3.125 mg Oral BID Maylene Frieze, DO        chlorthalidone (HYGROTON) tablet 50 mg  50 mg Oral Daily Maylene Frieze, DO        clopidogrel (PLAVIX) tablet 75 mg  75 mg Oral Daily Maylene Frieze, DO        doxazosin (CARDURA) tablet 4 mg  4 mg Oral BID Regi Jones MD        gabapentin (NEURONTIN) capsule 300 mg  300 mg Oral TID Margaritaa Raymon, DO   300 mg at 10/29/21 0643    glucose (GLUTOSE) 40 % oral gel 15 g  15 g Oral PRN Tommy Ennis, DO        dextrose 50 % IV solution  12.5 g IntraVENous PRN Tommy Ennis, DO        glucagon (rDNA) injection 1 mg  1 mg IntraMUSCular PRN Tommy Ennis, DO        dextrose 5 % solution  100 mL/hr IntraVENous PRN Tommy Ennis, DO        sodium chloride flush 0.9 % injection 5-40 mL  5-40 mL IntraVENous 2 times per day Atha Melaniai, DO        sodium chloride flush 0.9 % injection 5-40 mL  5-40 mL IntraVENous PRN Tomym Ennis, DO        0.9 % sodium chloride infusion  25 mL IntraVENous PRN Tommy Ennis, DO        polyethylene glycol (GLYCOLAX) packet 17 g  17 g Oral Daily PRN Tommy Ennis, DO        acetaminophen (TYLENOL) tablet 650 mg  650 mg Oral Q6H PRN Tommy Ennis, DO        Or    acetaminophen (TYLENOL) suppository 650 mg  650 mg Rectal Q6H PRN Tommy Ennis, DO        promethazine (PHENERGAN) tablet 12.5 mg  12.5 mg Oral Q6H PRN Tommy Ennis, DO        ipratropium-albuterol (DUONEB) nebulizer solution 1 ampule  1 ampule Inhalation Q4H PRN Tommy Ennis, DO        insulin lispro (HUMALOG) injection vial 0-12 Units  0-12 Units SubCUTAneous TID  Gumaro Bowers,         insulin lispro (HUMALOG) injection vial 0-6 Units  0-6 Units SubCUTAneous Nightly Tommy Ennis DO        morphine (PF) injection 2 mg  2 mg IntraVENous Q4H PRN Tommy Ennis, DO   2 mg at 10/29/21 0644    heparin (porcine) injection 5,000 Units  5,000 Units SubCUTAneous 3 times per day Tommy Ennis DO         Current Outpatient Medications   Medication Sig Dispense Refill    amLODIPine (NORVASC) 10 MG tablet Take 1 tablet by mouth daily 30 tablet 5    doxazosin (CARDURA) 4 MG tablet Take 1 tablet by mouth 2 times daily 60 tablet 3    metaxalone (SKELAXIN) 800 MG tablet Take 800 mg by mouth 3 times daily      gabapentin (NEURONTIN) 300 MG capsule Take 1 capsule by mouth 3 times daily for 90 days.  90 capsule 3    losartan (COZAAR) 100 MG tablet       SPIRIVA HANDIHALER 18 MCG inhalation capsule       insulin lispro, 1 Unit Dial, (HUMALOG KWIKPEN) 100 UNIT/ML SOPN Inject into the skin 2 times daily Sliding scale dose      chlorthalidone (HYGROTEN) 50 MG tablet Take 1 tablet by mouth daily 30 tablet 0    atorvastatin (LIPITOR) 40 MG tablet Take 1 tablet by mouth nightly 30 tablet 3    carvedilol (COREG) 3.125 MG tablet Take 1 tablet by mouth 2 times daily 60 tablet 3    albuterol sulfate HFA (VENTOLIN HFA) 108 (90 Base) MCG/ACT inhaler Inhale 2 puffs into the lungs every 4 hours as needed for Wheezing 1 Inhaler 3    Calcium Alginate (ALGICELL CALCIUM DRESSING 4\"X8) MISC Apply 1 Device topically daily 30 each 3    PROMOGRAN COREY WOUND DRESSING MATRIX Apply topically Apply to left daily and cover with gauze 1 Package 3    clopidogrel (PLAVIX) 75 MG tablet Take 1 tablet by mouth daily 30 tablet 11       BP 92/63   Pulse 64   Temp 97.6 °F (36.4 °C) (Oral)   Resp 13   Ht 6' (1.829 m)   Wt 250 lb (113.4 kg)   SpO2 96%   BMI 33.91 kg/m²     PHYSICAL EXAM:  General appearance: Alert, awake and oriented-he was wondering why he was even sent to the emergency room-his O2 saturation 94% on room air  Head: No trauma, 2+ conjunctival pallor  Neck: Supple  Heart: Seems regular rate and rhythm-left chest wall cardiac device  LUNGS: Few rhonchi, no crackles  Abdomen: Soft, nontender  Extremities: No overt edema  Stasis dermatitis  He has amputation of left big toe, as well as right third toe  He has significant skin changes-suggestive of chronic edema-but  none now    LABS:  CBC:   Lab Results   Component Value Date    WBC 7.4 10/29/2021    WBC 6.6 10/28/2021    WBC 5.3 08/30/2021    HGB 9.4 10/29/2021    HGB 9.6 10/28/2021    HGB 9.9 08/30/2021     10/29/2021     10/28/2021     08/30/2021     Renal Panel:   Lab Results   Component Value Date  10/29/2021     10/28/2021     10/07/2021    K 5.3 10/29/2021    K 5.4 10/28/2021    K 4.4 10/07/2021    K 5.1 01/10/2018     10/29/2021    CL 98 10/28/2021    CL 96 10/07/2021    CO2 27 10/29/2021    CO2 29 10/28/2021    CO2 30 10/07/2021    BUN 26 10/29/2021    BUN 25 10/28/2021    BUN 14 10/07/2021    CREATININE 1.7 10/29/2021    CREATININE 1.7 10/28/2021    CREATININE 1.3 10/07/2021    GFRAA 48 10/29/2021    GFRAA 48 10/28/2021    GFRAA >60 10/07/2021    LABGLOM 40 10/29/2021    LABGLOM 40 10/28/2021    LABGLOM 54 10/07/2021    LABALBU 3.1 10/29/2021    LABALBU 3.1 10/29/2021    LABALBU 2.9 10/28/2021         Calcium:    Lab Results   Component Value Date    CALCIUM 8.1 10/29/2021    PTH 78 08/28/2015     Phosphorus:    Lab Results   Component Value Date    PHOS 3.3 10/29/2021       U/A:    Lab Results   Component Value Date    PROTEINU NEGATIVE 06/11/2021    NITRU NEGATIVE 06/11/2021    COLORU STRAW 06/11/2021    PHUR 5.0 08/19/2016    WBCUA <1 06/11/2021    RBCUA 1 06/11/2021    MUCUS RARE 06/08/2021    TRICHOMONAS NONE SEEN 06/11/2021    BACTERIA RARE 06/11/2021    CLARITYU CLEAR 06/11/2021    SPECGRAV 1.005 06/11/2021    UROBILINOGEN NEGATIVE 06/11/2021    BILIRUBINUR NEGATIVE 06/11/2021    BLOODU SMALL 06/11/2021    KETUA NEGATIVE 06/11/2021           IMPRESSION:  1.  CKD stage IIIa/B- I suspect his creatinine is little up because of better blood pressure-and hemodynamic changes-we will make sure he does not have any acute bladder obstruction-also make sure there is no additional intrinsic disease-he is high potassium partly could be from losartan  2. Severe atherosclerotic cardiovascular disease-he would need some intervention for his legs  3. Hypertension, needs manual confirmation  4.   Poor social support      PLAN:    We will start with UA and urinary indicis  I will hold the losartan for now  Also get a bladder scan  Manual good blood pressure when possible  Avoid any nonessential medication  His ideal blood pressure is 120-130/80  Follow clinically and biochemically

## 2021-10-29 NOTE — H&P
History and Physical      Name:  Judd Silva /Age/Sex: 1950  (70 y.o. male)   MRN & CSN:  [id number] & [id number] Admission Date/Time: 10/28/2021  4:59 PM   Location:  ED19/ED-19 PCP: Emeterio Hernandez Day: 2    Assessment and Plan:   Judd Silva is a 70 y.o.  male  who presents with Acute on chronic respiratory failure with hypoxemia ([address]    1. Acute-on-chronic hypoxic respiratory failure  2. COPD with chronic hypoxic respiratory failure  -Admit to inpatient services with continuous pulse ox  -Hypoxemic at outpatient clinic  -COVID-19 negative. Will obtain a respiratory disease panel. Patient does not seem to be in a COPD exacerbation. No increase sputum production, only minimal wheezing per patient, and does not feel like it is normal exacerbations. -Question if worsening baseline COPD and need for continual home O2  -Nasal canula oxygen prn to maintain SaO2 88-92% with pulse ox monitoring and consider NIPPV if NCO2 fails to maintain SoO2 88-92%  -Given history of COPD we will start patient on duo nebs. Holding off on steroids at this point as we need type glucose control for possible surgery.  -Encourage deep breathing and coughing and incentive spirometry  -Tobacco cessation education  -Avoid mucoactive agents (N-acetylcysteine) and chest physiotherapy if possible  -Further work-up to be dictated by results of respiratory disease panel. 3. Severe PVD  4. BL LE wounds  -Patient with severe PVD s/p PTA of left SFA in the past.  Status post metatarsal amputations.  -Patient is being evaluated by both general surgery and vascular surgery with consultation from nephrology given need for angio in the setting of CKD stage III. Patient notes increased pain in bilateral lower extremities.   Patient will need vascular assessment prior to surgery.  -Consult general surgery and vascular surgery, appreciate recommendations  -X-ray left foot shows no acute osseous abnormality  -X-ray right foot shows possible cortical step-off of the dorsal aspect of one of the metatarsal seen only on the lateral radiograph. -CT right foot shows no CT evidence of acute fracture or dislocation of the right foot. No CT evidence of osteomyelitis    5. Stage 3a chronic kidney disease  6. Hyperkalemia  -Baseline Cr ~1.6; Cr 1.7 in ED; follow on labs  -Baseline GFR ~55; GFR 48 in ED; follow on labs  -Follow BUN/sCr, electrolytes, intake/output  -Potassium 5.4 in ED. We will order Kayexalate. Follow with a.m. labs  -Consult nephrology, appreciate recommendations    7. IDDM 2  -Not on home basal insulin, ADULT DIET; Regular; 4 carb choices (60 gm/meal); Low Fat/Low Chol/High Fiber/2 gm Na; Low Sodium (2 gm); Low Phosphorus (Less than 1000 mg); Less than 60 gm diet, medium SSI + BG checks ACHS, hypoglycemic protocol, and target -180    8. Elevated troponins  -Patient adamantly denies chest pain  -Troponin I 0.023, cycle troponin x2  -If patient develops any chest pain or has any increasing cardiac biomarkers consider cardiology consult    9. Sclerotic posterior 11th rib, left  -Sclerotic posterior left 11th rib. New compared with 3/11/2021. Blastic metastatic disease is a consideration.  -We will obtain a follow-up left rib x-ray to exclude overlapping shadows    10. Elevated troponins  -Patient adamantly denies chest pain  -Troponin I 0.023, cycle troponin x2  -If patient develops any chest pain or has any increasing cardiac biomarkers consider cardiology consult    Other chronic medical conditions:   Continue all home meds except stated above or contraindicated.  HFrEF: Currently in exacerbation   Nonpitting lower extremity edema, chronic   HTN   HLD   CAD: Not currently in chest pain   Cardiomyopathy status post BiV ICD   Wheelchair-bound   Obesity   Tobacco abuse   Cannabis abuse    Diet ADULT DIET; Regular; 4 carb choices (60 gm/meal);  Low Fat/Low Chol/High Fiber/2 gm Na; Low Sodium (2 gm); Low Phosphorus (Less than 1000 mg); Less than 60 gm   DVT Prophylaxis [] Lovenox, [x]  Heparin, [] SCDs, [] Ambulation   GI Prophylaxis [] PPI,  [] H2 Blocker,  [] Carafate,  [] Diet/Tube Feeds   Code Status Full Code   Disposition Patient requires continued admission due to Acute on chronic respiratory failure with hypoxemia ([facility]   MDM [] Low, [] Moderate,[]  High  Patient's risk as above due to acuity of condition with potential for decompensation. History of Present Illness:     Chief Complaint: Acute on chronic respiratory failure with hypoxemia ([facility] [facility]    Helen Bernstein is a 70 y.o.  male with a reviewed and a contributory family history of heart disease and a PMH as stated above, who presents with complaints of bilateral lower extremity leg pain with chronic lower extremity edema. Patient was actually at his general surgeon's office today for follow-up when he was found to be hypoxemic and was brought to the ED via EMS. Patient does endorse COPD with intermittent home O2. Patient is unsure of his liters IV uses at home. He also seems to use it as needed and not at a specific time. Patient noted dyspnea on exertion, slight wheezing, and white sputum. When discussing COPD patient states sputum production is actually better than baseline, has denied fevers or chills, and states it does not feel like his normal COPD exacerbations. Patient adamantly denies any chest pain when discussing the symptoms. In regards to patient's bilateral lower extremity wounds and PVD patient states he is supposed to have an amputated. Patient states pain has been increasing since last hospitalization. Patient is wheelchair-bound and notes increase in pain with any pressure to the lower extremities. Patient endorses lower extremity neuropathy which complicates this picture. Patient only endorses sliding scale insulin and not long-acting insulin. Patient endorses pain medications as alleviating factor. Otherwise patient just states \"I am just not feeling good. \"  Otherwise patient denies headache, abdominal pain, nausea, vomiting, diarrhea, dark tarry stools, blood per rectum, dysuria, frequency, or urgency. Discussed case with ED provider. ROS:   Review of Systems   Constitutional: Positive for activity change and fatigue. Negative for appetite change, diaphoresis and fever. HENT: Negative for congestion, rhinorrhea and sore throat. Eyes: Negative for visual disturbance. Respiratory: Positive for cough and shortness of breath. Negative for chest tightness and wheezing. BOCANEGRA   Cardiovascular: Positive for leg swelling. Negative for chest pain and palpitations. Gastrointestinal: Negative for abdominal pain, blood in stool, diarrhea, nausea and vomiting. Genitourinary: Negative for dysuria, frequency and urgency. Musculoskeletal: Positive for arthralgias and gait problem. Skin: Positive for wound (Bilateral lower extremity). Negative for color change and rash. Neurological: Negative for dizziness, seizures, weakness, numbness and headaches. Psychiatric/Behavioral: Negative for confusion. Objective:   No intake or output data in the 24 hours ending 10/29/21 0427   Vitals:   Vitals:    10/28/21 1703   BP: 97/77   Pulse: 91   Resp: 16   Temp: 97.6 °F (36.4 °C)   SpO2: 94%     BP 97/77   Pulse 91   Temp 97.6 °F (36.4 °C) (Oral)   Resp 16   Ht 6' (1.829 m)   Wt 250 lb (113.4 kg)   SpO2 94%   BMI 33.91 kg/m²   Physical Exam:   Physical Exam  Vitals and nursing note reviewed. Constitutional:       General: He is awake. He is not in acute distress. Appearance: Normal appearance. He is obese. He is not ill-appearing, toxic-appearing or diaphoretic. Interventions: He is not intubated. Nasal cannula in place. Comments: Appears uncomfortable and in pain   HENT:      Head: Atraumatic.       Right Ear: External ear normal.      Left Ear: External ear normal.      Nose: Nose normal. No rhinorrhea. Mouth/Throat:      Mouth: Mucous membranes are moist.   Eyes:      General: No scleral icterus. Extraocular Movements: Extraocular movements intact. Conjunctiva/sclera: Conjunctivae normal.      Pupils: Pupils are equal, round, and reactive to light. Cardiovascular:      Rate and Rhythm: Normal rate and regular rhythm. Pulses: Normal pulses. Heart sounds: Normal heart sounds. No murmur heard. No gallop. Pulmonary:      Effort: Pulmonary effort is normal. No tachypnea, prolonged expiration or respiratory distress. He is not intubated. Breath sounds: No wheezing, rhonchi or rales. Comments: No significant wheezing found on examination. No areas of focal consolidation or crackles. Abdominal:      General: Abdomen is protuberant. Bowel sounds are normal. There is no distension. Palpations: Abdomen is soft. Tenderness: There is no abdominal tenderness. There is no guarding or rebound. Negative signs include Palma's sign and Rovsing's sign. Musculoskeletal:         General: Normal range of motion. Cervical back: Neck supple. Right lower le+ Edema present. Left lower le+ Edema present. Feet:      Comments: Patient missing multiple digits of both bilateral lower extremities. Decreased range of motion of remaining metatarsals. No definitive area of cellulitis or focal consolidation noted. Skin:     General: Skin is warm and dry. Capillary Refill: Capillary refill takes less than 2 seconds. Comments: See foot section   Neurological:      General: No focal deficit present. Mental Status: He is alert and oriented to person, place, and time. Mental status is at baseline. Cranial Nerves: No cranial nerve deficit, dysarthria or facial asymmetry. Motor: No tremor or seizure activity. Psychiatric:         Attention and Perception: He is attentive. Mood and Affect: Mood is not anxious. Speech: He is communicative. Speech is not slurred. Behavior: Behavior is cooperative. Past Medical History:      Past Medical History:   Diagnosis Date    Acid reflux     Acute MI ([address] 2004, 2008    Arthritis     Back    Broken teeth     Upper Front    CAD (coronary artery disease)     Sees Dr. Cliff Moralez [facility])     per old chart    CHF (congestive heart failure) ([facility]     Chronic back pain     Chronic kidney disease     STAGE 3 KIDNEY FAILURE- \"from my diabetes- do not follow with any one- have seen Dr Skylar Zuluaga in the past\"    Diabetes mellitus ([facility] [address] Dx 1965    per old chart pt has been diabetic since age 13    Diabetic neuropathy ([facility]     \"in my feet\"    H/O cardiovascular stress test 08/25/2016    H/O Doppler ultrasound 09/27/2018    Moderate disease of the right lower extremity with an JALEN of 0.72. Moderate to severe disease of the left lower extremity with an JALEN of 0.55.   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    Neuropathy     both feet    neuropathy     PAD (peripheral artery disease) ([address] 09/27/2018    PVD (peripheral vascular disease) ([facility] [address] [facility]     Sick sinus syndrome ([facility] [address] [facility]     Sleep apnea     \"sleep study 3 yrs ago- could not tolerate the cpap made me too dry\"    Spinal stenosis     Teeth missing     Upper And Lower    Type 2 diabetes mellitus without complication ([facility] [address]      PSHX:  has a past surgical history that includes Coronary angioplasty with stent; Dental surgery;  Colonoscopy (08/04/2016); pacemaker placement (06/04/2010); vascular surgery; colectomy (Right, 08/26/2016); Toe amputation (Right, 09/12/2017); Toe amputation (Right, 01/09/2018); Cardiac catheterization; Cardiac defibrillator placement (06/04/2010); Coronary angioplasty; Cardiac catheterization (07/14/2017); Cardiac catheterization (11/20/2018); Toe amputation (Left, 12/26/2020); and IR TUNNELED CVC PLACE WO SQ PORT/PUMP > 5 YEARS (6/14/2021). Allergies: Allergies   Allergen Reactions    Pcn [Penicillins] Hives    Fentanyl Itching       FAM HX: family history includes Cancer in his brother, father, and son; Diabetes in his brother, mother, and sister; Early Death (age of onset: 62) in his sister; Heart Disease in his brother and sister; High Blood Pressure in his brother, brother, and mother; Neuropathy in his sister; Other in his sister; Stroke in his mother; Vision Loss in his mother. Soc HX:   Social History     Socioeconomic History    Marital status:      Spouse name: None    Number of children: None    Years of education: None    Highest education level: None   Occupational History    None   Tobacco Use    Smoking status: Current Some Day Smoker     Packs/day: 0.25     Years: 36.00     Pack years: 9.00     Types: Cigars     Start date: 1/1/1980    Smokeless tobacco: Never Used    Tobacco comment: Client states he has stopped smoking   Vaping Use    Vaping Use: Never used   Substance and Sexual Activity    Alcohol use: No    Drug use: Yes     Types: Marijuana    Sexual activity: Never   Other Topics Concern    None   Social History Narrative    None     Social Determinants of Health     Financial Resource Strain:     Difficulty of Paying Living Expenses:    Food Insecurity:     Worried About Running Out of Food in the Last Year:     Ran Out of Food in the Last Year:    Transportation Needs:     Lack of Transportation (Medical):      Lack of Transportation (Non-Medical):    Physical Activity:     Days of Exercise per Week:     Minutes of Exercise per Session:    Stress:  Feeling of Stress :    Social Connections:     Frequency of Communication with Friends and Family:     Frequency of Social Gatherings with Friends and Family:     Attends [employer] Services:     Active Member of Clubs or Organizations:     Attends Club or Organization Meetings:     Marital Status:    Intimate Partner Violence:     Fear of Current or Ex-Partner:     Emotionally Abused:     Physically Abused:     Sexually Abused:        Data:   CBC with Differential:    Lab Results   Component Value Date    WBC 6.6 10/28/2021    RBC 3.42 10/28/2021    HGB 9.6 10/28/2021    HCT 30.7 10/28/2021     10/28/2021    MCV 89.8 10/28/2021    MCH 28.1 10/28/2021    MCHC 31.3 10/28/2021    RDW 15.4 10/28/2021    SEGSPCT 67.6 10/28/2021    LYMPHOPCT 23.3 10/28/2021    MONOPCT 6.8 10/28/2021    EOSPCT 1.5 09/08/2011    BASOPCT 0.5 10/28/2021    MONOSABS 0.5 10/28/2021    LYMPHSABS 1.5 10/28/2021    EOSABS 0.1 10/28/2021    BASOSABS 0.0 10/28/2021    DIFFTYPE AUTOMATED DIFFERENTIAL 10/28/2021       CMP:     Lab Results   Component Value Date     10/28/2021    K 5.4 10/28/2021    K 5.1 01/10/2018    CL 98 10/28/2021    CO2 29 10/28/2021    BUN 25 10/28/2021    CREATININE 1.7 10/28/2021    GFRAA 48 10/28/2021    LABGLOM 40 10/28/2021    GLUCOSE 372 10/28/2021    PROT 6.6 10/28/2021    PROT 6.3 01/21/2013    LABALBU 2.9 10/28/2021    CALCIUM 8.1 10/28/2021    BILITOT 0.4 10/28/2021    ALKPHOS 117 10/28/2021    AST 8 10/28/2021    ALT 11 10/28/2021       Troponin:  Lab Results   Component Value Date    TROPONINT 0.023 10/28/2021     Radiology results:  CT FOOT RIGHT WO CONTRAST   Final Result   No CT evidence of acute fracture or dislocation in the right foot. MRI may   be obtained if clinical suspicion is high in this patient with evidence of   diffuse osteopenia. No CT evidence of osteomyelitis.          XR FOOT RIGHT (MIN 3 VIEWS)   Final Result   Possible cortical step-off of the dorsal aspect of 1 of the metatarsal seen   only on the lateral radiograph which may reflect fracture. Recommend further   evaluation with cross-sectional imaging. XR FOOT LEFT (MIN 3 VIEWS)   Final Result   No acute osseous abnormality. XR CHEST PORTABLE   Final Result   No acute cardiopulmonary disease. Stable mild cardiomegaly. Unchanged AICD. Sclerotic posterior left 11th rib. This finding appears new compared with   03/11/2021. Blastic metastatic disease is a consideration. Follow-up left   rib x-rays may be helpful to exclude overlapping shadows. XR RIBS LEFT (2 VIEWS)    (Results Pending)          Medications:   Home Medications:   Prior to Admission medications    Medication Sig Start Date End Date Taking? Authorizing Provider   amLODIPine (NORVASC) 10 MG tablet Take 1 tablet by mouth daily 10/18/21   Davin Jerez MD   doxazosin (CARDURA) 4 MG tablet Take 1 tablet by mouth 2 times daily 10/18/21   Davin Jerez MD   metaxalone (SKELAXIN) 800 MG tablet Take 800 mg by mouth 3 times daily    Historical Provider, MD   gabapentin (NEURONTIN) 300 MG capsule Take 1 capsule by mouth 3 times daily for 90 days.  7/1/21 10/18/21  Rani Zavala MD   losartan (COZAAR) 100 MG tablet  5/29/21   Historical Provider, MD Corley Woodland 18 MCG inhalation capsule  6/18/21   Historical Provider, MD   insulin lispro, 1 Unit Dial, (HUMALOG KWIKPEN) 100 UNIT/ML SOPN Inject into the skin 2 times daily Sliding scale dose    Historical Provider, MD   chlorthalidone (HYGROTEN) 50 MG tablet Take 1 tablet by mouth daily 6/15/21   Ana Watkins MD   atorvastatin (LIPITOR) 40 MG tablet Take 1 tablet by mouth nightly 12/13/20   Jacqueline Ordaz MD   carvedilol (COREG) 3.125 MG tablet Take 1 tablet by mouth 2 times daily 12/13/20   Jacqueline Ordaz MD   albuterol sulfate HFA (VENTOLIN HFA) 108 (90 Base) MCG/ACT inhaler Inhale 2 puffs into the lungs every 4 hours as needed for Wheezing 8/14/20

## 2021-10-29 NOTE — ED NOTES
Patient allowed IV placement, labs to be drawn, and bp cuff placement at this time. Patient states Shona Roach is willing to do this for pain medicine because the pain in his foot is out of control\". VSS and no acute signs or symptoms of distress noted.       Juanpablo Hwang RN  10/29/21 [id number]

## 2021-10-29 NOTE — PROGRESS NOTES
Patient is refusing X-rays of his Ribs at this time, He is stating \"there is nothing wrong with my ribs. \" Doctor notified by perfect serve and Rn notified.  [facility]

## 2021-10-29 NOTE — PROGRESS NOTES
Chief Complaint   Patient presents with    Wound Check     2 week f/u, bilat wounds: medial right hallux, entire left hallux, distal left 2nd digit; infection in all wounds, very pain in both feet         SUBJECTIVE:  HPI: Patient presents today for f/u of his bilateral foot wounds. Upon presentation, vitals were obtained by our office medical staff. Pt was found to have O2 Saturation in the low 80's on RA. Pt was placed on O2 at 3L NC by our MA prior to me entering the room. Upon entering, the pt was saturating in the upper 80's-low 90's with NC at 3L. Patient reports that he has not felt well all day. He reports SOB, CP with radiation into the L arm, mild Abd pain and dizziness. Denies N/V. Denies known covid exposure.             Past Surgical History:   Procedure Laterality Date    CARDIAC CATHETERIZATION      per old chart done 10/2014    CARDIAC CATHETERIZATION  07/14/2017    with angiography of leg    CARDIAC CATHETERIZATION  11/20/2018    PATENT STENTS OF ALL THREE MAJOR VESSELS    CARDIAC DEFIBRILLATOR PLACEMENT  06/04/2010    [employer] Secura  Defibrillator Implanted    COLECTOMY Right 08/26/2016    laparascopic; robotic assisted    COLONOSCOPY  08/04/2016    CORONARY ANGIOPLASTY      \"15 Heart Stents\"    CORONARY ANGIOPLASTY WITH STENT PLACEMENT      per old chart had angio with stent to circumflex and obtuse marginal artery at [facility] 5/2010( old chart also gives hx of stent placement done 2000,2004 and 2005)    DENTAL SURGERY      Teeth Extracted In Past    IR TUNNELED CATHETER PLACEMENT GREATER THAN 5 YEARS  6/14/2021    IR TUNNELED CATHETER PLACEMENT GREATER THAN 5 YEARS 6/14/2021 [facility]    PACEMAKER PLACEMENT  06/04/2010    Medtronic Secura DR Defibrillator Implanted    TOE AMPUTATION Right 09/12/2017    Rt 3rd toe    TOE AMPUTATION Right 01/09/2018     Right 5th toe amputation and Toenails trimmed left 2,3,4 and 5th toes    TOE AMPUTATION Left 12/26/2020    LEFT GREAT TOE AMPUTATION performed by Jaziel Gonzalez MD at [facility]      per old chart had balloon angioplasty right superfical femoral artery,right popliteal artery,,right ant.tibial artery, right tibioperoneal trunk, and right post.tibial artery wna stent placement right popliteal artery and superfical femoral artery 7/2012     Past Medical History:   Diagnosis Date    Acid reflux     Acute MI ([facility] 2004, 2008    Arthritis     Back    Broken teeth     Upper Front    CAD (coronary artery disease)     Sees Dr. Cliff Moralez [facility])     per old chart    CHF (congestive heart failure) (HCC)     Chronic back pain     Chronic kidney disease     STAGE 3 KIDNEY FAILURE- \"from my diabetes- do not follow with any one- have seen Dr Skylar Zuluaga in the past\"    Diabetes mellitus ([facility] Dx 1965    per old chart pt has been diabetic since age 13    Diabetic neuropathy ([facility]     \"in my feet\"    H/O cardiovascular stress test 08/25/2016    H/O Doppler ultrasound 09/27/2018    Moderate disease of the right lower extremity with an JALEN of 0.72.   Moderate to severe disease of the left lower extremity with an JALEN of 0.55.   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    Neuropathy     both feet    neuropathy     PAD (peripheral artery disease) ([facility] 09/27/2018    PVD (peripheral vascular disease) ([facility]     Sick sinus syndrome ([facility]     Sleep apnea     \"sleep study 3 yrs ago- could not tolerate the cpap made me too dry\"    Spinal stenosis     Teeth missing     Upper And Lower    Type 2 diabetes mellitus without complication ([facility]      Family History   Problem Relation Age of Onset    Diabetes Mother     Stroke Mother     High Blood Pressure Mother     Vision Loss Mother     Cancer Father         Prostate Cancer    Diabetes Sister     Neuropathy Sister     Other Sister         \"Breathing Problems\"    Heart Disease Sister     Early Death Sister 62        Heart Complications    Cancer Brother         \"Stomach Cancer\"    High Blood Pressure Brother     Diabetes Brother     Heart Disease Brother     High Blood Pressure Brother     Cancer Son         \"Testicle Cancer\"     Social History     Socioeconomic History    Marital status:      Spouse name: Not on file    Number of children: Not on file    Years of education: Not on file    Highest education level: Not on file   Occupational History    Not on file   Tobacco Use    Smoking status: Current Some Day Smoker     Packs/day: 0.25     Years: 36.00     Pack years: 9.00     Types: Cigars     Start date: 1/1/1980    Smokeless tobacco: Never Used    Tobacco comment: Client states he has stopped smoking   Vaping Use    Vaping Use: Never used   Substance and Sexual Activity    Alcohol use: No    Drug use: Yes     Types: Marijuana    Sexual activity: Never   Other Topics Concern    Not on file   Social History Narrative    Not on file     Social Determinants of Health     Financial Resource Strain:     Difficulty of Paying Living Expenses:    Food Insecurity:     Worried About Running Out of Food in the Last Year:     [address] in the Last Year:    Transportation Needs:     Lack of Transportation (Medical):      Lack of Transportation (Non-Medical):    Physical Activity:     Days of Exercise per Week:     Minutes of Exercise per Session:    Stress:     Feeling of Stress :    Social Connections:     Frequency of Communication with Friends and Family:     Frequency of Social Gatherings with Friends and Family:     Attends Jewish Services:     Active Member of Clubs or Organizations:     Attends Club or Organization Meetings:     Marital Status:    Intimate Partner Violence:     Fear of Current or Ex-Partner:     Emotionally Abused:     Physically Abused:     Sexually Abused:        Current Outpatient Medications   Medication Sig Dispense Refill    amLODIPine (NORVASC) 10 MG tablet Take 1 tablet by mouth daily 30 tablet 5    doxazosin (CARDURA) 4 MG tablet Take 1 tablet by mouth 2 times daily 60 tablet 3    metaxalone (SKELAXIN) 800 MG tablet Take 800 mg by mouth 3 times daily      losartan (COZAAR) 100 MG tablet       SPIRIVA HANDIHALER 18 MCG inhalation capsule       insulin lispro, 1 Unit Dial, (HUMALOG KWIKPEN) 100 UNIT/ML SOPN Inject into the skin 2 times daily Sliding scale dose      chlorthalidone (HYGROTEN) 50 MG tablet Take 1 tablet by mouth daily 30 tablet 0    atorvastatin (LIPITOR) 40 MG tablet Take 1 tablet by mouth nightly 30 tablet 3    carvedilol (COREG) 3.125 MG tablet Take 1 tablet by mouth 2 times daily 60 tablet 3    albuterol sulfate HFA (VENTOLIN HFA) 108 (90 Base) MCG/ACT inhaler Inhale 2 puffs into the lungs every 4 hours as needed for Wheezing 1 Inhaler 3    Calcium Alginate (ALGICELL CALCIUM DRESSING 4\"X8) MISC Apply 1 Device topically daily 30 each 3    PROMOGRAN COREY WOUND DRESSING MATRIX Apply topically Apply to left daily and cover with gauze 1 Package 3    clopidogrel (PLAVIX) 75 MG tablet Take 1 tablet by mouth daily 30 tablet 11    gabapentin (NEURONTIN) 300 MG capsule Take 1 capsule by mouth 3 times daily for 90 days. 90 capsule 3     No current facility-administered medications for this visit. Allergies   Allergen Reactions    Pcn [Penicillins] Hives    Fentanyl Itching       Review of Systems:       Review of Systems   Constitutional: Negative for chills and fever. HENT: Negative for ear pain, mouth sores, sore throat and tinnitus.     Eyes: Negative for photophobia, redness and itching. Respiratory: Positive for chest tightness and shortness of breath. Negative for apnea, choking and stridor. Cardiovascular: Positive for chest pain. Negative for palpitations. Gastrointestinal: Negative for anal bleeding, constipation and rectal pain. Endocrine: Negative for polydipsia. Genitourinary: Negative for enuresis, flank pain and hematuria. Musculoskeletal: Negative for back pain, joint swelling and myalgias. Skin: Negative for color change and pallor. Allergic/Immunologic: Negative for environmental allergies. Neurological: Positive for dizziness. Negative for syncope and speech difficulty. Psychiatric/Behavioral: Negative for confusion and hallucinations. OBJECTIVE:  Physical Exam:    BP (!) 140/60   Pulse 60   Ht 6' (1.829 m)   Wt 248 lb (112.5 kg)   SpO2 92% Comment: with 3 lpm sitting in wheelchair  BMI 33.63 kg/m²      Physical Exam  Constitutional:       Appearance: He is well-developed. He is morbidly obese. Interventions: Nasal cannula in place. Comments: In WC   HENT:      Head: Normocephalic. Eyes:      Pupils: Pupils are equal, round, and reactive to light. Cardiovascular:      Rate and Rhythm: Normal rate. Pulmonary:      Effort: Pulmonary effort is normal. Prolonged expiration present. Abdominal:      General: There is no distension. Palpations: Abdomen is soft. There is no mass. Tenderness: There is no abdominal tenderness. There is no guarding or rebound. Musculoskeletal:         General: Normal range of motion. Cervical back: Normal range of motion and neck supple. Right lower le+ Pitting Edema present. Left lower le+ Pitting Edema present. Feet:      Comments: Wounds to bilateral feet. Skin:     General: Skin is warm. Neurological:      Mental Status: He is alert and oriented to person, place, and time. ASSESSMENT:  1. Hypoxia    2. Chest pain at rest    3.  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[address] PLAN:  Treatment:  Patient hypoxic with CP, SOB dizziness. Bilateral foot wounds were not fully evaluated today. Recommended patient go to the ED for evaluation, which he was agreeable to - quad called for transport to the ED. Patient counseled on risks, benefits, and alternatives of treatment plan at length. Patient states an understanding and willingness to proceed with plan. No orders of the defined types were placed in this encounter. No orders of the defined types were placed in this encounter. Follow Up:  No follow-ups on file.       Ana Cheng PA-C

## 2021-10-29 NOTE — ED NOTES
Patient refusing xrays at this time. States \"theres nothing wrong with my ribs! \".      Anupama Edmonds RN  10/29/21 0236

## 2021-10-30 VITALS
TEMPERATURE: 98.4 F | RESPIRATION RATE: 16 BRPM | HEART RATE: 68 BPM | HEIGHT: 72 IN | SYSTOLIC BLOOD PRESSURE: 142 MMHG | DIASTOLIC BLOOD PRESSURE: 105 MMHG | BODY MASS INDEX: 33.86 KG/M2 | OXYGEN SATURATION: 94 % | WEIGHT: 250 LBS

## 2021-10-30 LAB
ALBUMIN SERPL-MCNC: 3.3 GM/DL (ref 3.4–5)
ALP BLD-CCNC: 90 IU/L (ref 40–128)
ALT SERPL-CCNC: 9 U/L (ref 10–40)
ANION GAP SERPL CALCULATED.3IONS-SCNC: 10 MMOL/L (ref 4–16)
AST SERPL-CCNC: 9 IU/L (ref 15–37)
BILIRUB SERPL-MCNC: 0.6 MG/DL (ref 0–1)
BUN BLDV-MCNC: 28 MG/DL (ref 6–23)
CALCIUM SERPL-MCNC: 8.5 MG/DL (ref 8.3–10.6)
CHLORIDE BLD-SCNC: 102 MMOL/L (ref 99–110)
CO2: 25 MMOL/L (ref 21–32)
CREAT SERPL-MCNC: 1.8 MG/DL (ref 0.9–1.3)
GFR AFRICAN AMERICAN: 45 ML/MIN/1.73M2
GFR NON-AFRICAN AMERICAN: 37 ML/MIN/1.73M2
GLUCOSE BLD-MCNC: 100 MG/DL (ref 70–99)
GLUCOSE BLD-MCNC: 81 MG/DL (ref 70–99)
MAGNESIUM: 2.1 MG/DL (ref 1.8–2.4)
PHOSPHORUS: 3.1 MG/DL (ref 2.5–4.9)
POTASSIUM SERPL-SCNC: 5 MMOL/L (ref 3.5–5.1)
PROCALCITONIN: 0.1
SODIUM BLD-SCNC: 137 MMOL/L (ref 135–145)
TOTAL PROTEIN: 6.6 GM/DL (ref 6.4–8.2)

## 2021-10-30 PROCEDURE — 36415 COLL VENOUS BLD VENIPUNCTURE: CPT

## 2021-10-30 PROCEDURE — 80053 COMPREHEN METABOLIC PANEL: CPT

## 2021-10-30 PROCEDURE — 84145 PROCALCITONIN (PCT): CPT

## 2021-10-30 PROCEDURE — 6360000002 HC RX W HCPCS: Performed by: STUDENT IN AN ORGANIZED HEALTH CARE EDUCATION/TRAINING PROGRAM

## 2021-10-30 PROCEDURE — 6370000000 HC RX 637 (ALT 250 FOR IP): Performed by: STUDENT IN AN ORGANIZED HEALTH CARE EDUCATION/TRAINING PROGRAM

## 2021-10-30 PROCEDURE — 83735 ASSAY OF MAGNESIUM: CPT

## 2021-10-30 PROCEDURE — 6370000000 HC RX 637 (ALT 250 FOR IP): Performed by: INTERNAL MEDICINE

## 2021-10-30 PROCEDURE — 84100 ASSAY OF PHOSPHORUS: CPT

## 2021-10-30 PROCEDURE — 82962 GLUCOSE BLOOD TEST: CPT

## 2021-10-30 RX ORDER — AMLODIPINE BESYLATE 5 MG/1
5 TABLET ORAL 2 TIMES DAILY
Status: DISCONTINUED | OUTPATIENT
Start: 2021-10-30 | End: 2021-10-30 | Stop reason: HOSPADM

## 2021-10-30 RX ADMIN — CARVEDILOL 3.12 MG: 6.25 TABLET, FILM COATED ORAL at 09:30

## 2021-10-30 RX ADMIN — MORPHINE SULFATE 2 MG: 2 INJECTION, SOLUTION INTRAMUSCULAR; INTRAVENOUS at 02:14

## 2021-10-30 RX ADMIN — DOXAZOSIN 4 MG: 4 TABLET ORAL at 09:31

## 2021-10-30 RX ADMIN — GABAPENTIN 300 MG: 300 CAPSULE ORAL at 06:23

## 2021-10-30 RX ADMIN — HEPARIN SODIUM 5000 UNITS: 5000 INJECTION INTRAVENOUS; SUBCUTANEOUS at 06:23

## 2021-10-30 RX ADMIN — MORPHINE SULFATE 2 MG: 2 INJECTION, SOLUTION INTRAMUSCULAR; INTRAVENOUS at 06:23

## 2021-10-30 RX ADMIN — CHLORTHALIDONE 50 MG: 25 TABLET ORAL at 09:30

## 2021-10-30 RX ADMIN — CLOPIDOGREL BISULFATE 75 MG: 75 TABLET, FILM COATED ORAL at 09:30

## 2021-10-30 ASSESSMENT — PAIN DESCRIPTION - ORIENTATION: ORIENTATION: RIGHT;LEFT

## 2021-10-30 ASSESSMENT — PAIN DESCRIPTION - PROGRESSION: CLINICAL_PROGRESSION: NOT CHANGED

## 2021-10-30 ASSESSMENT — PAIN DESCRIPTION - PAIN TYPE: TYPE: ACUTE PAIN;NEUROPATHIC PAIN

## 2021-10-30 ASSESSMENT — PAIN SCALES - GENERAL
PAINLEVEL_OUTOF10: 9
PAINLEVEL_OUTOF10: 7

## 2021-10-30 ASSESSMENT — PAIN DESCRIPTION - DESCRIPTORS: DESCRIPTORS: BURNING;ACHING

## 2021-10-30 ASSESSMENT — PAIN - FUNCTIONAL ASSESSMENT: PAIN_FUNCTIONAL_ASSESSMENT: PREVENTS OR INTERFERES SOME ACTIVE ACTIVITIES AND ADLS

## 2021-10-30 ASSESSMENT — PAIN DESCRIPTION - LOCATION: LOCATION: FOOT

## 2021-10-30 ASSESSMENT — PAIN DESCRIPTION - FREQUENCY: FREQUENCY: CONTINUOUS

## 2021-10-30 ASSESSMENT — PAIN DESCRIPTION - ONSET: ONSET: ON-GOING

## 2021-10-30 NOTE — PROGRESS NOTES
Care discussed with Dr Lauren Stock  Pt stable from surgical standpoint with regards to his foot wounds  Ok for d/c and pt to cont local wound care with calcium alginate daily and f/u with me in office in one wk      Jacek Varma MD

## 2021-10-30 NOTE — PROGRESS NOTES
Nephrology Progress Note  10/30/2021 1:54 PM        Subjective:   Admit Date: 10/28/2021  PCP: Gagandeep Lock    Interval History: He is watching and enjoying [place] versus [place] game    Diet: Reasonable    ROS: No overt shortness of breath he is with supplemental oxygen  No confusion  Reported good urine output recorded about almost 1.5 L/day    Data:     Current meds:    carvedilol  3.125 mg Oral BID    chlorthalidone  50 mg Oral Daily    clopidogrel  75 mg Oral Daily    doxazosin  4 mg Oral BID    gabapentin  300 mg Oral TID    sodium chloride flush  5-40 mL IntraVENous 2 times per day    insulin lispro  0-12 Units SubCUTAneous TID WC    insulin lispro  0-6 Units SubCUTAneous Nightly    heparin (porcine)  5,000 Units SubCUTAneous 3 times per day    albuterol sulfate HFA  2 puff Inhalation BID    ipratropium  2 puff Inhalation BID      dextrose      sodium chloride           I/O last 3 completed shifts: In: 240 [P.O.:240]  Out: 1410 [Urine:1410]    CBC:   Recent Labs     10/28/21  1820 10/29/21  0638   WBC 6.6 7.4   HGB 9.6* 9.4*    204          Recent Labs     10/28/21  1820 10/28/21  1820 10/29/21  7616 10/29/21  0638 10/29/21  0945 10/29/21  1222 10/30/21  0458   *  --  135  --   --   --  137   K 5.4*  --  5.3*  --   --   --  5.0   CL 98*  --  100  --   --   --  102   CO2 29  --  27  --   --   --  25   BUN 25*  --  26*  --   --   --  28*   CREATININE 1.7*  --  1.7*  --   --   --  1.8*   GLUCOSE 372*   < > 326*   < > 324 232 81    < > = values in this interval not displayed.        Lab Results   Component Value Date    CALCIUM 8.5 10/30/2021    PHOS 3.1 10/30/2021       Objective:     Vitals: BP (!) 142/105   Pulse 68   Temp 98.4 °F (36.9 °C) (Oral)   Resp 16   Ht 6' (1.829 m)   Wt 250 lb (113.4 kg)   SpO2 94%   BMI 33.91 kg/m²     General appearance: Alert, awake and oriented, no distress  HEENT: At least 2+ conjunctival pallor  Neck: Supple  Lungs: No gross crackles  Heart: Seems regular rate and rhythm  Abdomen: Soft, nontender  Extremities: 1-2+ edema now-he does have amputation of several toes bilaterally-is no apparent open wound      Problem List :         Impression :     1. CKD stage III-stable at this point  2. Hyperkalemia-acceptable of losartan and now  3. Hypertension-acceptable but need manual blood pressure and adjustment  4. Underlying atherosclerotic cardiovascular disease-    Recommendation/Plan  :     1. Okay to discharge from my standpoint  2. I will try go back and check a manual blood pressure  3. Probably discharged without losartan for now  4. He has an appointment with me, he will follow-up with me  5.  Also Dr. Branden Flores will arrange for his-wound care and further treatment      Alfred Ozuna MD MD

## 2021-10-30 NOTE — DISCHARGE SUMMARY
.    Discharge Summary    Name:  Preston Galdamez /Age/Sex: 1950  (70 y.o. male)   MRN & CSN:  [id number] & [id number] Admission Date/Time: 10/28/2021  4:59 PM   Attending:  Sabine Zeng MD Discharging Physician: Sabine Zeng MD     Hospital Course:   Preston Galdamez is a 70 y.o.  male  who presents with Acute on chronic respiratory failure with hypoxemia ([address]    1. Acute-on-chronic hypoxic respiratory failure  2. COPD with chronic hypoxic respiratory failure  -COVID-19 negative.    -Tobacco cessation education  -Continue home oxygen     3. Severe PVD  4. BL LE wounds  -Patient with severe PVD s/p PTA of left SFA in the past.  Status post metatarsal amputations.  -Patient was evaluated by both general surgery and vascular surgery with consultation from nephrology given need for angio in the setting of CKD stage III. -X-ray left foot shows no acute osseous abnormality  -X-ray right foot shows possible cortical step-off of the dorsal aspect of one of the metatarsal seen only on the lateral radiograph. -CT right foot shows no CT evidence of acute fracture or dislocation of the right foot. No CT evidence of osteomyelitis  -Follow up with Surgery as outpatient     5. Stage 3a chronic kidney disease    6. Hyperkalemia  -Baseline Cr ~1.6; Cr 1.7 in ED  -Baseline GFR ~55; GFR 48 in ED  -Potassium 5.4 in ED. -Seen by Nephrology. Losartan held. Follow up as outpatient.     7. IDDM 2  -Continue insulin     8. Elevated troponins  -Patient adamantly denied chest pain  -Troponin I 0.023  9. Sclerotic posterior 11th rib, left  -Sclerotic posterior left 11th rib. New compared with 3/11/2021. Blastic metastatic disease is a consideration.  -We will obtain a follow-up left rib x-ray to exclude overlapping shadows        Other chronic medical conditions:   Continue all home meds except stated above or contraindicated.   · HFrEF: Currently in exacerbation  · Nonpitting lower extremity edema, chronic  · HTN  · HLD  · CAD: Not currently in chest pain  · Cardiomyopathy status post BiV ICD  · Wheelchair-bound  · Obesity  · Tobacco abuse  · Cannabis abuse    Patient was sent in from his surgeon's office to the ED because he was hypoxemic. He had gone for follow-up on his chronic leg wounds. Chest x-ray no acute findings. Stable mild cardiomegaly. Unchanged ICD. He was placed on supplemental oxygen. He denied shortness of breath or chest pain. He is on home oxygen at night. Patient advised to use oxygen continuous. Is going home today. Patient follow-up with pulmonary and his primary care physician. General surgery for his wounds. The patient expressed appropriate understanding of and agreement with the discharge recommendations, medications, and plan.      Consults this admission:  IP CONSULT TO HOSPITALIST  IP CONSULT TO VASCULAR SURGERY  IP CONSULT TO NEPHROLOGY  IP CONSULT TO GENERAL SURGERY    Discharge Instruction:   Follow up appointments: General surgery  Primary care physician:  within 2 weeks    Diet:  diabetic diet   Activity: activity as tolerated  Disposition: Discharged to:   [x]Home, []HHC, []SNF, []Acute Rehab, []Hospice   Condition on discharge: Stable    Discharge Medications:      [place], [address] Medication Instructions SANDRA:[id number]    Printed on:10/30/21 6434   Medication Information                      albuterol sulfate HFA (VENTOLIN HFA) 108 (90 Base) MCG/ACT inhaler  Inhale 2 puffs into the lungs every 4 hours as needed for Wheezing             amLODIPine (NORVASC) 10 MG tablet  Take 1 tablet by mouth daily             atorvastatin (LIPITOR) 40 MG tablet  Take 1 tablet by mouth nightly             Calcium Alginate (ALGICELL CALCIUM DRESSING 4\"X8) MISC  Apply 1 Device topically daily             carvedilol (COREG) 3.125 MG tablet  Take 1 tablet by mouth 2 times daily             chlorthalidone (HYGROTEN) 50 MG tablet  Take 1 tablet by mouth daily clopidogrel (PLAVIX) 75 MG tablet  Take 1 tablet by mouth daily             doxazosin (CARDURA) 4 MG tablet  Take 1 tablet by mouth 2 times daily             gabapentin (NEURONTIN) 300 MG capsule  Take 1 capsule by mouth 3 times daily for 90 days. insulin lispro, 1 Unit Dial, (HUMALOG KWIKPEN) 100 UNIT/ML SOPN  Inject into the skin 2 times daily Sliding scale dose             metaxalone (SKELAXIN) 800 MG tablet  Take 800 mg by mouth 3 times daily             PROMOGRAN COREY WOUND DRESSING MATRIX  Apply topically Apply to left daily and cover with gauze             SPIRIVA HANDIHALER 18 MCG inhalation capsule                   Objective Findings at Discharge:   BP (!) 142/105   Pulse 68   Temp 98.4 °F (36.9 °C) (Oral)   Resp 16   Ht 6' (1.829 m)   Wt 250 lb (113.4 kg)   SpO2 94%   BMI 33.91 kg/m²            PHYSICAL EXAM   GEN Awake male, sitting upright in bed in no apparent distress. Appears given age. EYES Pupils are equally round. No scleral erythema, discharge, or conjunctivitis. HENT Mucous membranes are moist. Oral pharynx without exudates, no evidence of thrush. NECK Supple, no apparent thyromegaly or masses. RESP Clear to auscultation. Symmetric chest movement while on room air. CARDIO/VASC S1/S2 auscultated. Regular rate without appreciable murmurs, rubs, or gallops. No JVD or carotid bruits. Peripheral pulses equal bilaterally and palpable. No peripheral edema. GI Abdomen is soft without significant tenderness, masses, or guarding. Bowel sounds are normoactive. Rectal exam deferred. MSK Multiple digit amputation both feet  SKIN Normal coloration, warm, dry. NEURO Cranial nerves appear grossly intact, normal speech, no lateralizing weakness. PSYCH Awake, alert, oriented x 3. Affect appropriate.     BMP/CBC  Recent Labs     10/28/21  1820 10/29/21  0638 10/30/21  0458   * 135 137   K 5.4* 5.3* 5.0   CL 98* 100 102   CO2 29 27 25   BUN 25* 26* 28*   CREATININE 1.7* 1.7* 1.8*   WBC 6.6 7.4  --    HCT 30.7* 30.6*  --     204  --        IMAGING:      Discharge Time of 35 minutes    Electronically signed by Rodrigo Curiel MD on 10/30/2021 at 2:14 PM

## 2021-10-30 NOTE — PROGRESS NOTES
Patient upset, stating that he hasn't seen a physician in \"quite a while\". Patient wanting updates on plan of care for today and possible discharge. Lead hospitalist notified.

## 2021-10-30 NOTE — PROGRESS NOTES
4 Eyes Skin Assessment     NAME:  Daisha Hamilton  YOB: 1950  MEDICAL RECORD NUMBER:  [id number]    The patient is being assess for  Admission    I agree that 2 RN's have performed a thorough Head to Toe Skin Assessment on the patient. ALL assessment sites listed below have been assessed. Areas assessed by both nurses:    Head, Face, Ears, Shoulders, Back, Chest, Arms, Elbows, Hands, Sacrum. Buttock, Coccyx, Ischium and Legs. Feet and Heels        Does the Patient have a Wound? Yes wound(s) were present on assessment.  LDA wound assessment was Initiated and completed        Pablo Prevention initiated:  Yes   Wound Care Orders initiated:  Yes    Pressure Injury (Stage 3,4, Unstageable, DTI, NWPT, and Complex wounds) if present place consult order under [de-identified] Yes    New and Established Ostomies if present place consult order under : NA      Nurse 1 eSignature: Electronically signed by Renée Gong RN on 10/30/21 at 3:14 AM EDT    **SHARE this note so that the co-signing nurse is able to place an eSignature**    Nurse 2 eSignature: Ana (agency RN)

## 2021-10-30 NOTE — PROGRESS NOTES
Discharge instructions reviewed with patient, all questions answered. Wound care provided for bilateral feet, patient sent home with supplies and instructed to follow up with wound clinic as soon as possible. Patient verbalized understanding. Patient calling daughter for ride home.

## 2021-11-08 ENCOUNTER — TELEPHONE (OUTPATIENT)
Dept: BARIATRICS/WEIGHT MGMT | Age: 71
End: 2021-11-08

## 2021-11-08 NOTE — TELEPHONE ENCOUNTER
Per Dr. René Shoemaker reply from [facility] pt. Has been given multiple prescriptions and need to follow up with pain management or his primary care physician.   Office called pt to advise spoke with daughter and daughter understood and call ended

## 2021-11-08 NOTE — TELEPHONE ENCOUNTER
PT. Daughter called into office stating pt. In a lot of pain with b/l foot pain. Pt had wound care appt. 10/29/21 pt. Missed appt and has been r/s to 11/12/21 please advise if pt.  Can have refill on medication

## 2021-11-12 ENCOUNTER — HOSPITAL ENCOUNTER (OUTPATIENT)
Dept: WOUND CARE | Age: 71
Discharge: HOME | End: 2021-11-12
Payer: MEDICARE

## 2021-11-12 VITALS
RESPIRATION RATE: 20 BRPM | TEMPERATURE: 98.1 F | SYSTOLIC BLOOD PRESSURE: 212 MMHG | HEART RATE: 68 BPM | DIASTOLIC BLOOD PRESSURE: 88 MMHG

## 2021-11-12 DIAGNOSIS — I73.9 PVD (PERIPHERAL VASCULAR DISEASE) (HCC): Primary | ICD-10-CM

## 2021-11-12 DIAGNOSIS — L97.524 CHRONIC FOOT ULCER, LEFT, WITH NECROSIS OF BONE (HCC): ICD-10-CM

## 2021-11-12 PROCEDURE — 99213 OFFICE O/P EST LOW 20 MIN: CPT

## 2021-11-12 PROCEDURE — 11042 DBRDMT SUBQ TIS 1ST 20SQCM/<: CPT

## 2021-11-12 PROCEDURE — 87075 CULTR BACTERIA EXCEPT BLOOD: CPT

## 2021-11-12 PROCEDURE — 87205 SMEAR GRAM STAIN: CPT

## 2021-11-12 PROCEDURE — 87077 CULTURE AEROBIC IDENTIFY: CPT

## 2021-11-12 PROCEDURE — 87186 SC STD MICRODIL/AGAR DIL: CPT | Performed by: INTERNAL MEDICINE

## 2021-11-12 PROCEDURE — 11044 DBRDMT BONE 1ST 20 SQ CM/<: CPT

## 2021-11-12 PROCEDURE — 87070 CULTURE OTHR SPECIMN AEROBIC: CPT

## 2021-11-12 PROCEDURE — 87076 CULTURE ANAEROBE IDENT EACH: CPT

## 2021-11-12 PROCEDURE — 87186 SC STD MICRODIL/AGAR DIL: CPT

## 2021-11-12 ASSESSMENT — PAIN DESCRIPTION - FREQUENCY: FREQUENCY: CONTINUOUS

## 2021-11-12 ASSESSMENT — PAIN DESCRIPTION - ONSET: ONSET: ON-GOING

## 2021-11-12 ASSESSMENT — PAIN SCALES - GENERAL: PAINLEVEL_OUTOF10: 10

## 2021-11-12 ASSESSMENT — PAIN DESCRIPTION - PROGRESSION: CLINICAL_PROGRESSION: NOT CHANGED

## 2021-11-12 ASSESSMENT — PAIN DESCRIPTION - DESCRIPTORS: DESCRIPTORS: BURNING;STABBING

## 2021-11-12 ASSESSMENT — PAIN - FUNCTIONAL ASSESSMENT: PAIN_FUNCTIONAL_ASSESSMENT: PREVENTS OR INTERFERES SOME ACTIVE ACTIVITIES AND ADLS

## 2021-11-12 ASSESSMENT — PAIN DESCRIPTION - ORIENTATION: ORIENTATION: RIGHT;LEFT

## 2021-11-12 ASSESSMENT — PAIN DESCRIPTION - LOCATION: LOCATION: FOOT

## 2021-11-12 ASSESSMENT — PAIN DESCRIPTION - PAIN TYPE: TYPE: CHRONIC PAIN

## 2021-11-12 NOTE — H&P
[address] [facility] Initial Visit      Jessica Dhillon  AGE: 70 y.o. GENDER: male  : 1950  EPISODE DATE:  2021   Referred by: Dr. Abbie Savage:     CHIEF COMPLAINT : non-healing foot ulcers     HISTORY of PRESENT ILLNESS      Jessica Dhillon is a 70 y.o. male who presents to the [address] [place] [address] for an initial visit for evaluation and treatment of Chronic arterial and diabetic  ulcer(s) of  Bilateral toes. The condition is of marked severity. The ulcer has been present for months. The underlying cause is thought to be diabetes and arterial.  He has been following with Dr. Krista Alanis and also has had vascular surgery f/u. He has significant arterial disease and unfortunately also has significant kidney disease- he needs intervention but can only have the CO2, which he is awaiting referral to a vascular consult in [place]. Until then, he is at the wound center for care. He has chronic leg and foot pain related to his arterial disease and chronic non-healing wounds related to the diabetes and the arterial disease. He has been treated for significant wound infection and has had IV antibiotics. He is finished with them at this point. He comes in today with exposed bone which is loose in the wound bed, I take this out and send for a culture . This proves a diagnosis of osteomyelitis. I consider him a candidate for possible HBO given Sadnra Grade 3 ulcer and known arterial disease. I did discuss this case with Dr. Krista Alanis- his wound care and course of treatment really depends a lot on the ability to get vascular intervention . If we can improve his circulation, minimal invasive to be done, if we can't improve his circulation, he ultimately will likely require BKA. The patient has significant underlying medical conditions as below.      Wound Pain Timing/Severity: constant  Quality of pain: tender  Severity of pain:  6 / 10   Modifying Factors: diabetes and arterial insufficiency  Associated Signs/Symptoms: none        PAST MEDICAL HISTORY        Diagnosis Date    Acid reflux     Acute MI [facility]) 2004, 2008    Arthritis     Back    Broken teeth     Upper Front    CAD (coronary artery disease)     Sees Dr. Christine Santa [facility])     per old chart    [facility]est[facility]     Chronic back pain     Chronic kidney disease     STAGE 3 KIDNEY FAILURE- \"from my diabetes- do not follow with any one- have seen Dr Lamont Combs in the past\"    Diabetes mellitus ([facility] Dx 1965    per old chart pt has been diabetic since age 13    Diabetic neuropathy ([address]     \"in my feet\"    H/O cardiovascular stress test 08/25/2016    H/O Doppler ultrasound 09/27/2018    Moderate disease of the right lower extremity with an JALEN of 0.72.   Moderate to severe disease of the left lower extremity with an JALEN of 0.55.   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    Neuropathy     both feet    neuropathy     PAD (peripheral artery disease) ([address] 09/27/2018    PVD (peripheral vascular disease) ([address]     Sick sinus syndrome ([address]     Sleep apnea     \"sleep study 3 yrs ago- could not tolerate the cpap made me too dry\"    Spinal stenosis     Teeth missing     Upper And Lower    Type 2 diabetes mellitus without complication ([address]     WD-Chronic foot ulcer, left, with necrosis of bone ([address] 11/12/2021       PAST SURGICAL HISTORY    Past Surgical History:   Procedure Laterality Date    CARDIAC CATHETERIZATION      per old chart done 10/2014    CARDIAC CATHETERIZATION  07/14/2017    with angiography of leg    CARDIAC CATHETERIZATION 11/20/2018    PATENT STENTS OF ALL THREE MAJOR VESSELS    CARDIAC DEFIBRILLATOR PLACEMENT  06/04/2010    [employer] [address] Defibrillator Implanted    COLECTOMY Right 08/26/2016    laparascopic; robotic assisted    COLONOSCOPY  08/04/2016    CORONARY ANGIOPLASTY      \"15 Heart Stents\"    CORONARY ANGIOPLASTY WITH STENT PLACEMENT      per old chart had angio with stent to circumflex and obtuse marginal artery at [facility] 5/2010( old chart also gives hx of stent placement done 2000,2004 and 2005)    DENTAL SURGERY      Teeth Extracted In Past    IR TUNNELED CATHETER PLACEMENT GREATER THAN 5 YEARS  6/14/2021    IR TUNNELED CATHETER PLACEMENT GREATER THAN 5 YEARS 6/14/2021 [facility] SPECIAL PROCEDURES    PACEMAKER PLACEMENT  06/04/2010    [employer] [address] DR Defibrillator Implanted    TOE AMPUTATION Right 09/12/2017    Rt 3rd toe    TOE AMPUTATION Right 01/09/2018     Right 5th toe amputation and Toenails trimmed left 2,3,4 and 5th toes    TOE AMPUTATION Left 12/26/2020    LEFT GREAT TOE AMPUTATION performed by Kris Bach MD at [address] [place] [address]      per old chart had balloon angioplasty right superfical femoral artery,right popliteal artery,,right ant.tibial artery, right tibioperoneal trunk, and right post.tibial artery wna stent placement right popliteal artery and superfical femoral artery 7/2012       FAMILY HISTORY    Family History   Problem Relation Age of Onset    Diabetes Mother     Stroke Mother     High Blood Pressure Mother     Vision Loss Mother     Cancer Father         Prostate Cancer    Diabetes Sister     Neuropathy Sister     Other Sister         \"Breathing Problems\"    Heart Disease Sister     Early Death Sister 62        Heart Complications    Cancer Brother         \"Stomach Cancer\"    High Blood Pressure Brother     Diabetes Brother     Heart Disease Brother     High Blood Pressure Brother     Cancer Son         \"Testicle Cancer\"       SOCIAL HISTORY    Social History     Tobacco Use    Smoking status: Current Some Day Smoker     Packs/day: 0.25     Years: 36.00     Pack years: 9.00     Types: Cigars     Start date: 1/1/1980    Smokeless tobacco: Never Used    Tobacco comment: Client states he has stopped smoking   Vaping Use    Vaping Use: Never used   Substance Use Topics    Alcohol use: No    Drug use: Yes     Types: Marijuana (Weed)       ALLERGIES    Allergies   Allergen Reactions    Pcn [Penicillins] Hives    Fentanyl Itching       MEDICATIONS    Current Outpatient Medications on File Prior to Encounter   Medication Sig Dispense Refill    amLODIPine (NORVASC) 10 MG tablet Take 1 tablet by mouth daily 30 tablet 5    doxazosin (CARDURA) 4 MG tablet Take 1 tablet by mouth 2 times daily 60 tablet 3    metaxalone (SKELAXIN) 800 MG tablet Take 800 mg by mouth 3 times daily      gabapentin (NEURONTIN) 300 MG capsule Take 1 capsule by mouth 3 times daily for 90 days. 90 capsule 3    SPIRIVA HANDIHALER 18 MCG inhalation capsule       insulin lispro, 1 Unit Dial, (HUMALOG KWIKPEN) 100 UNIT/ML SOPN Inject into the skin 2 times daily Sliding scale dose      chlorthalidone (HYGROTEN) 50 MG tablet Take 1 tablet by mouth daily 30 tablet 0    atorvastatin (LIPITOR) 40 MG tablet Take 1 tablet by mouth nightly 30 tablet 3    carvedilol (COREG) 3.125 MG tablet Take 1 tablet by mouth 2 times daily 60 tablet 3    albuterol sulfate HFA (VENTOLIN HFA) 108 (90 Base) MCG/ACT inhaler Inhale 2 puffs into the lungs every 4 hours as needed for Wheezing 1 Inhaler 3    Calcium Alginate (ALGICELL CALCIUM DRESSING 4\"X8) MISC Apply 1 Device topically daily 30 each 3    PROMOGRAN COREY WOUND DRESSING MATRIX Apply topically Apply to left daily and cover with gauze 1 Package 3    clopidogrel (PLAVIX) 75 MG tablet Take 1 tablet by mouth daily 30 tablet 11     No current facility-administered medications on file prior to encounter.        PROBLEM LIST    Patient Active Problem List Diagnosis    [facility]per[facility]) [facility]    Chronic coronary artery disease    Biventricular ICD (implantable cardioverter-defibrillator) in place    Chronic combined systolic and diastolic heart failure ([facility])    Chronic kidney disease, stage III (moderate) ([facility])    Mixed hyperlipidemia    Sick sinus syndrome ([address]    Type 2 diabetes mellitus with diabetic polyneuropathy ([address]    Spinal stenosis of lumbar region    Obesity, Class I, BMI 30-34.9    S/P partial colectomy    Tubulovillous adenoma of colon    Microalbuminuria    WD-PVD (peripheral vascular disease) ([facility])    Limb ischemia    Necrotic toes ([facility])    Toe gangrene ([facility])    Diabetic foot infection ([address]    Chronic kidney disease (CKD) stage G3a/A2, moderately decreased glomerular filtration rate (GFR) between 45-59 mL/min/1.73 square meter and albuminuria creatinine ratio between  mg/g ([facility])    Edema    ICD (implantable cardioverter-defibrillator) battery depletion    Hyperkalemia    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[facility])    Ischemia of toe    Acute kidney injury ([address]    Fluid overload    DM (diabetes mellitus) ([facility])    Precordial pain    Acute chest pain    Unstable angina ([facility])    Chronic kidney disease (CKD) stage G3a/A3, moderately decreased glomerular filtration rate (GFR) between 45-59 mL/min/1.73 square meter and albuminuria creatinine ratio greater than 300 mg/g ([facility])    Cardiomyopathy (HCC)    Diabetic neuropathy ([facility])    HTN (hypertension)    Epigastric pain    Acute on chronic congestive heart failure (HCC)    Bilateral lower extremity edema    Acute on chronic systolic CHF (congestive heart failure) ([facility])    Diabetic foot ulcer with osteomyelitis ([address]    Visit for wound check    Moderate malnutrition ([address]    Long term (current) use of antibiotics    WD-Diabetic ulcer of toe of right foot associated with type 2 diabetes mellitus, with fat layer exposed ([address]    WD-Diabetic ulcer of toe of left foot associated with type 2 diabetes mellitus, with fat layer exposed ([address]    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    Persistent wound pain    Receiving intravenous antibiotic treatment as outpatient    Acute on chronic respiratory failure with hypoxemia ([address]    WD-Chronic foot ulcer, left, with necrosis of bone ([address]       REVIEW OF SYSTEMS    Pertinent items are noted in HPI. Objective:      BP (!) 212/88   Pulse 68   Temp 98.1 °F (36.7 °C) (Temporal)   Resp 20     PHYSICAL EXAM  GEN: Awake, oriented HIGH blood pressure today. CV: S1, S2, RRR  Lungs: CTA anteriorly   ABD: Obese  EXT: edema, open wounds on toes with visible bone exposed on the left toe. Dermatologic exam: Visual inspection of the periwound reveals the skin to be normal in turgor and texture  Wound exam: see wound description below in procedure note      Assessment:       Judd Silva  appears to have a non-healing wound of the bilateral feet. The etiology of the wound is felt to be arterial and diabetic. There are multiple complicating factors including edema, diabetes and arterial insufficiency. A comprehensive wound management program would be helpful to heal this wound. Assessments completed include fall risk and nutritional, functional,and psychological status. At this time appropriate care would include: periodic debridement and wound care as below. He needs vascular evaluation and intervention ASAP. He might be a good candidate for HBO as well. Need to f/u the BONE culture that was taken today. Problem List Items Addressed This Visit     WD-Chronic foot ulcer, left, with necrosis of bone ([address]            Procedure Note    Indications:  Based on my examination of this patient's wound(s) today, sharp excision into necrotic bone is required to promote healing and evaluate the extent of previous healing.     Performed by: Doron Sanchez MD    Consent obtained: Yes    Time out taken:  Yes    Pain Control: Anesthetic  Anesthetic: 5% Lidocaine Ointment Topical     Debridement:Excisional Debridement    Using curette, #15 blade scalpel, scissors and forceps the wound(s) was/were sharply debrided down through and including the removal of epidermis, dermis, subcutaneous tissue and bone. Devitalized Tissue Debrided:  fibrin, biofilm and slough    Pre Debridement Measurements:  Are located in the Wound Documentation Flow Sheet    All active wounds listed below with today's date are evaluated  Wound(s)    debrided this date include # : 1 and 2     Post  Debridement Measurements:  Wound 07/06/17 Other (Comment) Toe (Comment  which one) (Active)   Number of days: 3195       Wound 12/10/20 Foot Left;[place] (Active)   Wound Etiology Diabetic 10/30/21 1133   Wound Assessment Pink/red 10/30/21 1133   Number of days: 337       Wound 12/10/20 Foot Left;Lateral fluid filled blister (Active)   Number of days: 336       Wound 10/30/21   #1 Left Dorsal Great Toe (Active)   Wound Image   11/12/21 1138   Wound Etiology Diabetic 11/12/21 1138   Dressing Status Clean; Dry; Intact; New dressing applied 11/12/21 1251   Wound Cleansed Soap and water;  Wound cleanser 11/12/21 1138   Dressing/Treatment Hydrofera blue; ABD 11/12/21 1251   Wound Length (cm) 1.5 cm 11/12/21 1138   Wound Width (cm) 2.1 cm 11/12/21 1138   Wound Depth (cm) 0.2 cm 11/12/21 1138   Wound Surface Area (cm^2) 3.15 cm^2 11/12/21 1138   Wound Volume (cm^3) 0.63 cm^3 11/12/21 1138   Post-Procedure Length (cm) 1.5 cm 11/12/21 1235   Post-Procedure Width (cm) 2.1 cm 11/12/21 1235   Post-Procedure Depth (cm) 0.2 cm 11/12/21 1235   Post-Procedure Surface Area (cm^2) 3.15 cm^2 11/12/21 1235   Post-Procedure Volume (cm^3) 0.63 cm^3 11/12/21 1235   Distance Tunneling (cm) 0 cm 11/12/21 1138   Tunneling Position ___ O'Clock 0 11/12/21 1138   Undermining Starts ___ O'Clock 0 11/12/21 1138   Undermining Ends___ O'Clock 0 11/12/21 1138   Undermining Maxium Distance (cm) 0 11/12/21 1138   Wound Assessment [facility] 11/12/21 1138   Drainage Amount Moderate 11/12/21 1138   Drainage Description Clear 11/12/21 1138   Odor None 11/12/21 1138   Dina-wound Assessment Dry/flaky; Intact 11/12/21 1138   Margins Defined edges 11/12/21 1138   Wound Thickness Description not for Pressure Injury Full thickness 11/12/21 1138   Number of days: 13       Wound 10/30/21 Toe (Comment  which one)  #2 Left Dorsal Second Toe  (Active)   Wound Image   11/12/21 1138   Wound Etiology Diabetic 11/12/21 1138   Dressing Status Clean; Dry; Intact; New dressing applied 11/12/21 1251   Wound Cleansed Wound cleanser; Soap and water 11/12/21 1138   Dressing/Treatment Hydrofera blue; ABD 11/12/21 1251   Wound Length (cm) 2 cm 11/12/21 1138   Wound Width (cm) 1.4 cm 11/12/21 1138   Wound Depth (cm) 0.1 cm 11/12/21 1138   Wound Surface Area (cm^2) 2.8 cm^2 11/12/21 1138   Wound Volume (cm^3) 0.28 cm^3 11/12/21 1138   Post-Procedure Length (cm) 2 cm 11/12/21 1235   Post-Procedure Width (cm) 1.4 cm 11/12/21 1235   Post-Procedure Depth (cm) 0.1 cm 11/12/21 1235   Post-Procedure Surface Area (cm^2) 2.8 cm^2 11/12/21 1235   Post-Procedure Volume (cm^3) 0.28 cm^3 11/12/21 1235   Distance Tunneling (cm) 0 cm 11/12/21 1138   Tunneling Position ___ O'Clock 0 11/12/21 1138   Undermining Starts ___ O'Clock 0 11/12/21 1138   Undermining Ends___ O'Clock 0 11/12/21 1138   Undermining Maxium Distance (cm) 0 11/12/21 1138   Wound Assessment [place]/red; [facility] 11/12/21 1138   Drainage Amount Moderate 11/12/21 1138   Drainage Description Clear 11/12/21 1138   Odor None 11/12/21 1138   Dina-wound Assessment Dry/flaky; Intact 11/12/21 1138   Margins Defined edges 11/12/21 1138   Wound Thickness Description not for Pressure Injury Full thickness 11/12/21 1138   Number of days: 13       Wound 10/30/21 Toe (Comment  which one) #3 Right Great Toe (Active)   Wound Image   11/12/21 1138   Wound Etiology Diabetic 11/12/21 1138   Dressing Status Clean; Dry; Intact;  New dressing applied 11/12/21 1251   Wound Cleansed Soap and water; Wound cleanser 11/12/21 1138   Dressing/Treatment Hydrofera blue; ABD 11/12/21 1251   Wound Length (cm) 0.5 cm 11/12/21 1138   Wound Width (cm) 1 cm 11/12/21 1138   Wound Depth (cm) 0.2 cm 11/12/21 1138   Wound Surface Area (cm^2) 0.5 cm^2 11/12/21 1138   Wound Volume (cm^3) 0.1 cm^3 11/12/21 1138   Post-Procedure Length (cm) 0.5 cm 11/12/21 1235   Post-Procedure Width (cm) 1 cm 11/12/21 1235   Post-Procedure Depth (cm) 0.2 cm 11/12/21 1235   Post-Procedure Surface Area (cm^2) 0.5 cm^2 11/12/21 1235   Post-Procedure Volume (cm^3) 0.1 cm^3 11/12/21 1235   Distance Tunneling (cm) 0 cm 11/12/21 1138   Tunneling Position ___ O'Clock 0 11/12/21 1138   Undermining Starts ___ O'Clock 00 11/12/21 1138   Undermining Ends___ O'Clock 0 11/12/21 1138   Undermining Maxium Distance (cm) 0 11/12/21 1138   Wound Assessment [place]/red; [facility] 11/12/21 1138   Drainage Amount Moderate 11/12/21 1138   Drainage Description Clear 11/12/21 1138   Odor None 11/12/21 1138   Dina-wound Assessment Dry/flaky; Intact 11/12/21 1138   Margins Defined edges 11/12/21 1138   Wound Thickness Description not for Pressure Injury Full thickness 11/12/21 1138   Number of days: 13       Percent of Wound(s) Debrided: 100%  Wound 1 was sub cut debride  Wound 2 was bone debride with culture of the bone. Total  Area  Debrided:  5 sq cm     Bleeding:  Minimal    Hemostasis Achieved:  by pressure    Procedural Pain:  6  / 10     Post Procedural Pain:  6 / 10     Response to treatment:  Well tolerated by patient. Plan:     Discharge Instructions       PHYSICIAN ORDERS AND DISCHARGE INSTRUCTIONS    NOTE: Upon discharge from the [address], you will receive a patient experience survey. We would be grateful if you would take the time to fill this survey out.     Wound care order history:       Vascular studies: 6/8/2021 Date 11/12/2021   Imaging:    Cultures: Left 2nd toe bone obtained on 11/12/2021    Grafts: Date    Antibiotics:               Earlier Wound care treatments:     Primary care physician:     Continuing wound care orders and information:              Residence:                Continue home health care with:     Wound Medications:    Wound cleansing:      Do not scrub or use excessive force. Wash hands with soap and water before and after dressing changes. Prior to applying a clean dressing, cleanse wound with normal saline,          wound cleanser, or mild soap and water. Ask the physician or nurse before getting the wound(s) wet in a shower   Daily Wound management:    Keep weight off wounds and reposition every 2 hours. Avoid standing for long periods of time. Apply wraps/stockings in AM and remove at bedtime. If swelling is present, elevate legs to the level of the heart or above for 30                              minutes 4-5 times a day and/or when sitting. When taking antibiotics take entire prescription as ordered by physician    do not stop taking until medicine is all gone. Orders for this week: 2021    Right great toe--- wash with soap,and water,pat dry. Apply hydrofera blue cut to size and dampened with saline to wound bed and cover with half ABD , Wrap with conform and tape. Change daily. Left great toe--  wash with soap,and water,pat dry. Apply hydrofera blue cut to size and dampened with saline to wound bed and cover with half ABD , Wrap with conform and tape. Change daily. Left 2nd toe--  wash with soap,and water,pat dry. Apply hydrofera blue cut to size and dampened with saline to wound bed and cover with half ABD , Wrap with conform and tape. Change daily. RX:  Consult:  [facility] [phone]    Follow up with Dr Grace Hester   In 1  weeks in the wound care center  Call [phone] for any questions or concerns.   Date__________

## 2021-11-15 RX ORDER — GABAPENTIN 300 MG/1
300 CAPSULE ORAL 3 TIMES DAILY
Qty: 90 CAPSULE | Refills: 3 | Status: SHIPPED | OUTPATIENT
Start: 2021-11-15 | End: 2022-07-21

## 2021-11-16 LAB
CULTURE: ABNORMAL
Lab: ABNORMAL
SPECIMEN: ABNORMAL

## 2021-11-19 ENCOUNTER — HOSPITAL ENCOUNTER (OUTPATIENT)
Dept: WOUND CARE | Age: 71
Discharge: HOME | End: 2021-11-19

## 2021-11-22 ENCOUNTER — HOSPITAL ENCOUNTER (OUTPATIENT)
Dept: WOUND CARE | Age: 71
Discharge: HOME | End: 2021-11-22
Admitting: NURSE PRACTITIONER
Payer: MEDICARE

## 2021-11-22 VITALS
RESPIRATION RATE: 20 BRPM | TEMPERATURE: 97.1 F | DIASTOLIC BLOOD PRESSURE: 77 MMHG | SYSTOLIC BLOOD PRESSURE: 160 MMHG | HEART RATE: 71 BPM

## 2021-11-22 DIAGNOSIS — L97.512 DIABETIC ULCER OF TOE OF RIGHT FOOT ASSOCIATED WITH TYPE 2 DIABETES MELLITUS, WITH FAT LAYER EXPOSED (HCC): Primary | ICD-10-CM

## 2021-11-22 DIAGNOSIS — L97.522 DIABETIC ULCER OF TOE OF LEFT FOOT ASSOCIATED WITH TYPE 2 DIABETES MELLITUS, WITH FAT LAYER EXPOSED (HCC): ICD-10-CM

## 2021-11-22 DIAGNOSIS — E11.621 DIABETIC ULCER OF TOE OF RIGHT FOOT ASSOCIATED WITH TYPE 2 DIABETES MELLITUS, WITH FAT LAYER EXPOSED (HCC): Primary | ICD-10-CM

## 2021-11-22 DIAGNOSIS — L97.524 CHRONIC FOOT ULCER, LEFT, WITH NECROSIS OF BONE (HCC): ICD-10-CM

## 2021-11-22 DIAGNOSIS — E11.621 DIABETIC ULCER OF TOE OF LEFT FOOT ASSOCIATED WITH TYPE 2 DIABETES MELLITUS, WITH FAT LAYER EXPOSED (HCC): ICD-10-CM

## 2021-11-22 PROCEDURE — 11042 DBRDMT SUBQ TIS 1ST 20SQCM/<: CPT | Performed by: NURSE PRACTITIONER

## 2021-11-22 PROCEDURE — 11042 DBRDMT SUBQ TIS 1ST 20SQCM/<: CPT

## 2021-11-22 NOTE — PROGRESS NOTES
Wound Care Center Progress Note With Procedure    Kenan Ferguson  AGE: 70 y.o. GENDER: male  : 1950  EPISODE DATE:  2021     Subjective:     Chief Complaint   Patient presents with    Wound Check     BLE          HISTORY of PRESENT ILLNESS      Kenan Ferguson is a 70 y.o. male who presents today for wound evaluation of Chronic arterial and diabetic ulcer(s) of the toes both feet. The ulcer is of marked severity. The underlying cause of the wound is arterial and diabetes. The ulcer has been present for several months and he has been seeing Dr. Serjio Rey, he has seen Dr. Liudmila Pacheco in the past. He was referred to vascular surgery referral with Gisell Bustamante 21. Wound Pain Timing/Severity: constant  Quality of pain: aching, tender  Severity of pain:  6 / 10   Modifying Factors: edema, diabetes, obesity and arterial insufficiency  Associated Signs/Symptoms: edema, drainage and pain    Diabetes: Yes, on an oral insulin regimen, last A1c 7.9 as of 21  Smoking: Former smoker  Obesity: Yes  Anticoagulant therapy: Plavix  Immunosuppression: No  Other History: PVD s/p PTA of left SFA in the past, CKD stage III followed by Dr. Sonal Darling, he has CAD with ICD placement      Patient educated on the 6 essential components necessary for wound healing: Circulation, Debridements, Proper Dressings and Topical Wound Products, Infection Control, Edema Control and Offloading. Patient educated on those factors that negatively effect or impact wound healing: smoking, obesity, uncontrolled diabetes, anticoagulant and immunosuppressive regimens, inadequate nutrition, untreated arterial and venous disease if applicable and measures to manage edema.            PAST MEDICAL HISTORY        Diagnosis Date    Acid reflux     Acute MI [facility]) ,     Arthritis     Back    Broken teeth     Upper Front    CAD (coronary artery disease)     Sees Dr. Sonal Grady [facility])     per old chart  CHF (congestive heart failure) (HCC)     Chronic back pain     Chronic kidney disease     STAGE 3 KIDNEY FAILURE- \"from my diabetes- do not follow with any one- have seen Dr Julia Owens in the past\"    Diabetes mellitus ([address] Dx 1965    per old chart pt has been diabetic since age 13    Diabetic neuropathy ([address]     \"in my feet\"    H/O cardiovascular stress test 08/25/2016    H/O Doppler ultrasound 09/27/2018    Moderate disease of the right lower extremity with an JALEN of 0.72.   Moderate to severe disease of the left lower extremity with an JALEN of 0.55.   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    Neuropathy     both feet    neuropathy     PAD (peripheral artery disease) ([address] 09/27/2018    PVD (peripheral vascular disease) ([address]     Sick sinus syndrome ([address]     Sleep apnea     \"sleep study 3 yrs ago- could not tolerate the cpap made me too dry\"    Spinal stenosis     Teeth missing     Upper And Lower    Type 2 diabetes mellitus without complication ([address]     WD-Chronic foot ulcer, left, with necrosis of bone ([address] 11/12/2021       PAST SURGICAL HISTORY    Past Surgical History:   Procedure Laterality Date    CARDIAC CATHETERIZATION      per old chart done 10/2014    CARDIAC CATHETERIZATION  07/14/2017    with angiography of leg    CARDIAC CATHETERIZATION  11/20/2018    PATENT STENTS OF ALL THREE MAJOR VESSELS    CARDIAC DEFIBRILLATOR PLACEMENT  06/04/2010    [employer] Defibrillator Implanted    COLECTOMY Right 08/26/2016    laparascopic; robotic assisted    COLONOSCOPY  08/04/2016    CORONARY ANGIOPLASTY \"85 Heart Stents\"    CORONARY ANGIOPLASTY WITH STENT PLACEMENT      per old chart had angio with stent to circumflex and obtuse marginal artery at [facility] 2010( old chart also gives hx of stent placement done , and )   [address] [facility] [address]      Teeth Extracted In Past    IR TUNNELED CATHETER PLACEMENT GREATER THAN 5 YEARS  2021    IR TUNNELED CATHETER PLACEMENT GREATER THAN 5 YEARS 2021 [address] [facility] SPECIAL PROCEDURES    PACEMAKER PLACEMENT  2010    [employer] [address] Defibrillator Implanted    TOE AMPUTATION Right 2017    Rt 3rd toe    TOE AMPUTATION Right 2018     Right 5th toe amputation and Toenails trimmed left 2,3,4 and 5th toes    TOE AMPUTATION Left 2020    LEFT GREAT TOE AMPUTATION performed by Justin Costello MD at [address]      per old chart had balloon angioplasty right superfical femoral artery,right popliteal artery,,right ant.tibial artery, right tibioperoneal trunk, and right post.tibial artery wna stent placement right popliteal artery and superfical femoral artery 2012       FAMILY HISTORY    Family History   Problem Relation Age of Onset    Diabetes Mother     Stroke Mother     High Blood Pressure Mother     Vision Loss Mother     Cancer Father         Prostate Cancer    Diabetes Sister     Neuropathy Sister     Other Sister         \"Breathing Problems\"    Heart Disease Sister     Early Death Sister 62        Heart Complications    Cancer Brother         \"Stomach Cancer\"    High Blood Pressure Brother     Diabetes Brother     Heart Disease Brother     High Blood Pressure Brother     Cancer Son         \"Testicle Cancer\"       SOCIAL HISTORY    Social History     Tobacco Use    Smoking status: Former Smoker     Packs/day: 0.25     Years: 36.00     Pack years: 9.00     Types: Cigars     Start date: 1980     Quit date: 10/22/2021     Years since quittin.0    Smokeless tobacco: Never Used    Tobacco comment: Client states he has stopped smoking   Vaping Use    Vaping Use: Never used   Substance Use Topics    Alcohol use: No    Drug use: Yes     Types: Marijuana (Weed)       ALLERGIES    Allergies   Allergen Reactions    Pcn [Penicillins] Hives    Fentanyl Itching       MEDICATIONS    Current Outpatient Medications on File Prior to Encounter   Medication Sig Dispense Refill    gabapentin (NEURONTIN) 300 MG capsule TAKE 1 CAPSULE BY MOUTH 3 TIMES DAILY FOR 90 DAYS. 90 capsule 3    amLODIPine (NORVASC) 10 MG tablet Take 1 tablet by mouth daily 30 tablet 5    doxazosin (CARDURA) 4 MG tablet Take 1 tablet by mouth 2 times daily 60 tablet 3    metaxalone (SKELAXIN) 800 MG tablet Take 800 mg by mouth 3 times daily      SPIRIVA HANDIHALER 18 MCG inhalation capsule       insulin lispro, 1 Unit Dial, (HUMALOG KWIKPEN) 100 UNIT/ML SOPN Inject into the skin 2 times daily Sliding scale dose      chlorthalidone (HYGROTEN) 50 MG tablet Take 1 tablet by mouth daily 30 tablet 0    atorvastatin (LIPITOR) 40 MG tablet Take 1 tablet by mouth nightly 30 tablet 3    carvedilol (COREG) 3.125 MG tablet Take 1 tablet by mouth 2 times daily 60 tablet 3    albuterol sulfate HFA (VENTOLIN HFA) 108 (90 Base) MCG/ACT inhaler Inhale 2 puffs into the lungs every 4 hours as needed for Wheezing 1 Inhaler 3    Calcium Alginate (ALGICELL CALCIUM DRESSING 4\"X8) MISC Apply 1 Device topically daily 30 each 3    PROMOGRAN COREY WOUND DRESSING MATRIX Apply topically Apply to left daily and cover with gauze 1 Package 3    clopidogrel (PLAVIX) 75 MG tablet Take 1 tablet by mouth daily 30 tablet 11     No current facility-administered medications on file prior to encounter. REVIEW OF SYSTEMS    Pertinent items are noted in HPI. Constitutional: Negative for systemic symptoms including fever, chills and malaise.       Objective:      BP (!) 160/77   Pulse 71   Temp 97.1 °F (36.2 °C) (Temporal)   Resp 20     PHYSICAL EXAM    General: The patient is in no acute distress. Mental status:  Patient is appropriate, is  oriented to place and plan of care. Dermatologic exam: Visual inspection of the periwound reveals the skin to be edematous  Wound exam: see wound description below in procedure note      Assessment:     Problem List Items Addressed This Visit     WD-Diabetic ulcer of toe of right foot associated with type 2 diabetes mellitus, with fat layer exposed ([address] - Primary    WD-Diabetic ulcer of toe of left foot associated with type 2 diabetes mellitus, with fat layer exposed ([address]    WD-Chronic foot ulcer, left, with necrosis of bone ([address]        Procedure Note    Indications:  Based on my examination of this patient's wound(s) today, sharp excision into necrotic subcutaneous tissue is required to promote healing and evaluate the extent of previous healing. Performed by: Moshe Landau, APRN - CNP    Consent obtained: Yes    Time out taken:  Yes    Pain Control: Anesthetic  Anesthetic: 4% Lidocaine Liquid Topical     Debridement:Excisional Debridement    Using curette the wound(s) was/were sharply debrided down through and including the removal of subcutaneous tissue. Devitalized Tissue Debrided:  fibrin, slough and exudate    Pre Debridement Measurements:  Are located in the Wound Documentation Flow Sheet    All active wounds listed below with today's date are evaluated  Wound(s)    debrided this date include # : 1, 2 and 3     Post  Debridement Measurements:  Wound 10/30/21   #1 Left Dorsal Great Toe (Active)   Wound Image   11/12/21 1138   Wound Etiology Diabetic 11/12/21 1138   Dressing Status New dressing applied 11/22/21 1657   Wound Cleansed Soap and water;  Wound cleanser; Cleansed with saline 11/22/21 1635   Dressing/Treatment Hydrofera blue; ABD 11/12/21 1251   Offloading for Diabetic Foot Ulcers No offloading required 11/22/21 1635   Wound Length (cm) 2 cm 11/22/21 1635   Wound Width (cm) 2.4 cm 11/22/21 1635   Wound Depth (cm) 0.2 cm 11/22/21 1635   Wound Surface Area (cm^2) 4.8 cm^2 11/22/21 1635   Change in Wound Size % (l*w) -52.38 11/22/21 1635   Wound Volume (cm^3) 0.96 cm^3 11/22/21 1635   Wound Healing % -52 11/22/21 1635   Post-Procedure Length (cm) 2 cm 11/22/21 1644   Post-Procedure Width (cm) 2.4 cm 11/22/21 1644   Post-Procedure Depth (cm) 0.2 cm 11/22/21 1644   Post-Procedure Surface Area (cm^2) 4.8 cm^2 11/22/21 1644   Post-Procedure Volume (cm^3) 0.96 cm^3 11/22/21 1644   Distance Tunneling (cm) 0 cm 11/22/21 1635   Tunneling Position ___ O'Clock 0 11/22/21 1635   Undermining Starts ___ O'Clock 0 11/22/21 1635   Undermining Ends___ O'Clock 0 11/22/21 1635   Undermining Maxium Distance (cm) 0 11/22/21 1635   Wound Assessment Slough 11/22/21 1635   Drainage Amount Moderate 11/22/21 1635   Drainage Description Clear 11/22/21 1635   Odor None 11/22/21 1635   Dina-wound Assessment Maceration; Dry/flaky 11/22/21 1635   Margins Defined edges 11/22/21 1635   Wound Thickness Description not for Pressure Injury Full thickness 11/22/21 1635   Number of days: 23       Wound 10/30/21 Toe (Comment  which one)  #2 Left Dorsal Second Toe  (Active)   Wound Image   11/12/21 1138   Wound Etiology Diabetic 11/12/21 1138   Dressing Status New dressing applied 11/22/21 1657   Wound Cleansed Soap and water;  Wound cleanser; Cleansed with saline 11/22/21 1635   Dressing/Treatment Hydrofera blue; ABD 11/12/21 1251   Offloading for Diabetic Foot Ulcers No offloading required 11/22/21 1635   Wound Length (cm) 2.3 cm 11/22/21 1635   Wound Width (cm) 2.6 cm 11/22/21 1635   Wound Depth (cm) 0.2 cm 11/22/21 1635   Wound Surface Area (cm^2) 5.98 cm^2 11/22/21 1635   Change in Wound Size % (l*w) -113.57 11/22/21 1635   Wound Volume (cm^3) 1.196 cm^3 11/22/21 1635   Wound Healing % -327 11/22/21 1635   Post-Procedure Length (cm) 2.3 cm 11/22/21 1644   Post-Procedure Width (cm) 2.6 cm 11/22/21 1644   Post-Procedure Depth (cm) 0.2 cm 11/22/21 1644 Post-Procedure Surface Area (cm^2) 5.98 cm^2 11/22/21 1644   Post-Procedure Volume (cm^3) 1.196 cm^3 11/22/21 1644   Distance Tunneling (cm) 0 cm 11/22/21 1635   Tunneling Position ___ O'Clock 0 11/22/21 1635   Undermining Starts ___ O'Clock 0 11/22/21 1635   Undermining Ends___ O'Clock 0 11/22/21 1635   Undermining Maxium Distance (cm) 0 11/22/21 1635   Wound Assessment Exposed structure bone; [place]/red; [facility] 11/22/21 1635   Drainage Amount Moderate 11/22/21 1635   Drainage Description Clear 11/22/21 1635   Odor None 11/22/21 1635   Dina-wound Assessment Dry/flaky; Intact; Maceration 11/22/21 1635   Margins Defined edges 11/22/21 1635   Wound Thickness Description not for Pressure Injury Full thickness 11/22/21 1635   Number of days: 23       Wound 10/30/21 Toe (Comment  which one) #3 Right Great Toe (Active)   Wound Image   11/12/21 1138   Wound Etiology Diabetic 11/12/21 1138   Dressing Status New dressing applied 11/22/21 1657   Wound Cleansed Soap and water;  Wound cleanser; Cleansed with saline 11/22/21 1635   Dressing/Treatment Hydrofera blue; ABD 11/12/21 1251   Offloading for Diabetic Foot Ulcers No offloading required 11/22/21 1635   Wound Length (cm) 0.6 cm 11/22/21 1635   Wound Width (cm) 1 cm 11/22/21 1635   Wound Depth (cm) 0.2 cm 11/22/21 1635   Wound Surface Area (cm^2) 0.6 cm^2 11/22/21 1635   Change in Wound Size % (l*w) -20 11/22/21 1635   Wound Volume (cm^3) 0.12 cm^3 11/22/21 1635   Wound Healing % -20 11/22/21 1635   Post-Procedure Length (cm) 0.6 cm 11/22/21 1644   Post-Procedure Width (cm) 1 cm 11/22/21 1644   Post-Procedure Depth (cm) 0.2 cm 11/22/21 1644   Post-Procedure Surface Area (cm^2) 0.6 cm^2 11/22/21 1644   Post-Procedure Volume (cm^3) 0.12 cm^3 11/22/21 1644   Distance Tunneling (cm) 0 cm 11/22/21 1635   Tunneling Position ___ O'Clock 0 11/22/21 1635   Undermining Starts ___ O'Clock 0 11/22/21 1635   Undermining Ends___ O'Clock 0 11/22/21 1635   Undermining Maxium Distance (cm) 0 11/22/21 1635   Wound Assessment [place]/red; [facility] 11/22/21 1635   Drainage Amount Moderate 11/22/21 1635   Drainage Description Clear 11/22/21 1635   Odor None 11/22/21 1635   Dina-wound Assessment Dry/flaky; Intact; Maceration 11/22/21 1635   Margins Defined edges 11/22/21 1635   Wound Thickness Description not for Pressure Injury Full thickness 11/22/21 1635   Number of days: 23       Percent of Wound(s) Debrided: approximately 100%    Total  Area  Debrided:  11.38 sq cm     Bleeding:  Minimal    Hemostasis Achieved:  by pressure    Procedural Pain:  0  / 10     Post Procedural Pain:  0 / 10     Response to treatment:  Well tolerated by patient. Status of wound progress and description from last visit: All wounds are larger but stable today, will use betadine and stimulin powder and silver alginate to minimize bio burden and attempt to build tissue. Plan:       Discharge Instructions       PHYSICIAN ORDERS AND DISCHARGE INSTRUCTIONS     NOTE: Upon discharge from the [address], you will receive a patient experience survey. We would be grateful if you would take the time to fill this survey out.     Wound care order history:                    Vascular studies: 6/8/2021 Date 11/12/2021              Imaging:               Cultures: Left 2nd toe bone obtained on 11/12/2021               Grafts:  Date               Antibiotics:               Earlier Wound care treatments:               [facility] care physician:      Continuing wound care orders and information:              Residence:                Continue home health care with:            Wound Medications:              UEXKR cleansing:                           JN not scrub or use excessive force.                          Wash hands with soap and water before and after dressing changes.                         Prior to applying a clean dressing, cleanse wound with normal saline,                                wound cleanser, or mild soap and water.                         ZQB the physician or nurse before getting the wound(s) wet in a shower              Daily Wound management:                          Keep weight off wounds and reposition every 2 hours.                          CROFM standing for long periods of time.                          Apply wraps/stockings in AM and remove at bedtime.                          If swelling is present, elevate legs to the level of the heart or above for 30                                                         minutes 4-5 times a day and/or when sitting.                                             When taking antibiotics take entire prescription as ordered by physician                                                         do not stop taking until medicine is all gone.         Wound Care Notes:            Given surgical shoes 21     Orders for this week: 2021  Moisturize with A+D   Right great toe,Left great toe,Left 2nd toe--- wash with soap,and water,pat dry. Moisturize both legs and feet with A+D. Apply betadine and  stimulin powder  to wound bed and cover with siver alginate and qwick   Wrap with conform and tape. Change daily. Give surgical shoes 21      Follow up with Dr Theora Lesch  next  weeks in the wound care center  Call  for any questions or concerns.   Date__________   Time____________  [place] [phone]           Treatment Note Wound 10/30/21   #1 Left Dorsal Great Toe-Dressing/Treatment:  (betadine;stimulen powder;ag alginate;qwick;conform;tape)  Wound 10/30/21 Toe (Comment  which one)  #2 Left Dorsal Second Toe -Dressing/Treatment:  (betadine;stimulen powder;ag alginate;qwick;conform;tape)  Wound 10/30/21 Toe (Comment  which one) #3 Right Great Toe-Dressing/Treatment:  (betadine;stimulen powder;ag alginate;qwick;conform;tape)    Written Patient Dismissal Instructions Given            Electronically signed by MICKY Cook CNP on 2021 at 5:47 PM

## 2021-12-10 ENCOUNTER — HOSPITAL ENCOUNTER (OUTPATIENT)
Dept: WOUND CARE | Age: 71
Discharge: HOME | End: 2021-12-10
Payer: MEDICARE

## 2021-12-10 VITALS
RESPIRATION RATE: 18 BRPM | TEMPERATURE: 97.1 F | DIASTOLIC BLOOD PRESSURE: 57 MMHG | SYSTOLIC BLOOD PRESSURE: 99 MMHG | HEART RATE: 62 BPM

## 2021-12-10 DIAGNOSIS — E11.621 DIABETIC ULCER OF TOE OF RIGHT FOOT ASSOCIATED WITH TYPE 2 DIABETES MELLITUS, WITH FAT LAYER EXPOSED (HCC): ICD-10-CM

## 2021-12-10 DIAGNOSIS — L97.512 DIABETIC ULCER OF TOE OF RIGHT FOOT ASSOCIATED WITH TYPE 2 DIABETES MELLITUS, WITH FAT LAYER EXPOSED (HCC): ICD-10-CM

## 2021-12-10 DIAGNOSIS — I73.9 PVD (PERIPHERAL VASCULAR DISEASE) (HCC): ICD-10-CM

## 2021-12-10 DIAGNOSIS — E11.621 DIABETIC ULCER OF TOE OF LEFT FOOT ASSOCIATED WITH TYPE 2 DIABETES MELLITUS, WITH FAT LAYER EXPOSED (HCC): ICD-10-CM

## 2021-12-10 DIAGNOSIS — L97.524 CHRONIC FOOT ULCER, LEFT, WITH NECROSIS OF BONE (HCC): Primary | ICD-10-CM

## 2021-12-10 DIAGNOSIS — L97.522 DIABETIC ULCER OF TOE OF LEFT FOOT ASSOCIATED WITH TYPE 2 DIABETES MELLITUS, WITH FAT LAYER EXPOSED (HCC): ICD-10-CM

## 2021-12-10 PROCEDURE — 99213 OFFICE O/P EST LOW 20 MIN: CPT

## 2021-12-10 NOTE — PROGRESS NOTES
Reactions    Pcn [Penicillins] Hives    Fentanyl Itching       MEDICATIONS    Current Outpatient Medications on File Prior to Encounter   Medication Sig Dispense Refill    diclofenac sodium (VOLTAREN) 1 % GEL Apply 4 g topically 2 times daily 120 g 5    gabapentin (NEURONTIN) 300 MG capsule TAKE 1 CAPSULE BY MOUTH 3 TIMES DAILY FOR 90 DAYS. 90 capsule 3    amLODIPine (NORVASC) 10 MG tablet Take 1 tablet by mouth daily 30 tablet 5    doxazosin (CARDURA) 4 MG tablet Take 1 tablet by mouth 2 times daily 60 tablet 3    metaxalone (SKELAXIN) 800 MG tablet Take 800 mg by mouth 3 times daily      SPIRIVA HANDIHALER 18 MCG inhalation capsule       insulin lispro, 1 Unit Dial, (HUMALOG KWIKPEN) 100 UNIT/ML SOPN Inject into the skin 2 times daily Sliding scale dose      chlorthalidone (HYGROTEN) 50 MG tablet Take 1 tablet by mouth daily 30 tablet 0    atorvastatin (LIPITOR) 40 MG tablet Take 1 tablet by mouth nightly 30 tablet 3    carvedilol (COREG) 3.125 MG tablet Take 1 tablet by mouth 2 times daily 60 tablet 3    albuterol sulfate HFA (VENTOLIN HFA) 108 (90 Base) MCG/ACT inhaler Inhale 2 puffs into the lungs every 4 hours as needed for Wheezing 1 Inhaler 3    Calcium Alginate (ALGICELL CALCIUM DRESSING 4\"X8) MISC Apply 1 Device topically daily 30 each 3    PROMOGRAN COREY WOUND DRESSING MATRIX Apply topically Apply to left daily and cover with gauze 1 Package 3    clopidogrel (PLAVIX) 75 MG tablet Take 1 tablet by mouth daily 30 tablet 11     No current facility-administered medications on file prior to encounter. REVIEW OF SYSTEMS    Pertinent items are noted in HPI. Constitutional: Negative for systemic symptoms including fever, chills and malaise. Objective:      BP (!) 99/57   Pulse 62   Temp 97.1 °F (36.2 °C) (Temporal)   Resp 18     PHYSICAL EXAM       General: The patient is in no acute distress.     Mental status:  Patient is appropriate, is  oriented to place and plan of care.  Dermatologic exam: Visual inspection of the periwound reveals the skin to be normal in turgor and texture.   Wound exam:  see wound description below     All active wounds listed below with today's date are evaluated      Wound 07/06/17 Other (Comment) Toe (Comment  which one) (Active)   Number of days: 1617       Wound 12/10/20 Foot Left;Lateral fluid filled blister (Active)   Number of days: 364       Wound 10/30/21   #1 Left Dorsal Great Toe (Active)   Wound Image   12/10/21 1546   Wound Etiology Diabetic 12/10/21 1546   Dressing Status New dressing applied 12/10/21 1546   Wound Cleansed Irrigated with saline 12/10/21 1516   Dressing/Treatment Hydrofera blue; ABD 11/12/21 1251   Offloading for Diabetic Foot Ulcers No offloading required 12/10/21 1546   Wound Length (cm) 1.2 cm 12/10/21 1516   Wound Width (cm) 1.5 cm 12/10/21 1516   Wound Depth (cm) 0.5 cm 12/10/21 1516   Wound Surface Area (cm^2) 1.8 cm^2 12/10/21 1516   Change in Wound Size % (l*w) 42.86 12/10/21 1516   Wound Volume (cm^3) 0.9 cm^3 12/10/21 1516   Wound Healing % -43 12/10/21 1516   Post-Procedure Length (cm) 2 cm 11/22/21 1644   Post-Procedure Width (cm) 2.4 cm 11/22/21 1644   Post-Procedure Depth (cm) 0.2 cm 11/22/21 1644   Post-Procedure Surface Area (cm^2) 4.8 cm^2 11/22/21 1644   Post-Procedure Volume (cm^3) 0.96 cm^3 11/22/21 1644   Distance Tunneling (cm) 0 cm 12/10/21 1516   Tunneling Position ___ O'Clock 0 12/10/21 1516   Undermining Starts ___ O'Clock 0 12/10/21 1516   Undermining Ends___ O'Clock 0 12/10/21 1516   Undermining Maxium Distance (cm) 0 12/10/21 1516   Wound Assessment [place]; [place]/red 12/10/21 1516   Drainage Amount Moderate 12/10/21 1516   Drainage Description Clear 12/10/21 1516   Odor None 12/10/21 1516   Dina-wound Assessment Maceration; Dry/flaky 12/10/21 1516   Margins Defined edges 12/10/21 1516   Wound Thickness Description not for Pressure Injury Full thickness 12/10/21 1516   Number of days: 41       Wound 10/30/21   #2 Left Dorsal Second Toe  (Active)   Wound Image   12/10/21 1546   Wound Etiology Diabetic 12/10/21 1546   Dressing Status New dressing applied 12/10/21 1546   Wound Cleansed Irrigated with saline 12/10/21 1516   Dressing/Treatment Hydrofera blue; ABD 11/12/21 1251   Offloading for Diabetic Foot Ulcers No offloading required 12/10/21 1546   Wound Length (cm) 2 cm 12/10/21 1516   Wound Width (cm) 1.4 cm 12/10/21 1516   Wound Depth (cm) 0.3 cm 12/10/21 1516   Wound Surface Area (cm^2) 2.8 cm^2 12/10/21 1516   Change in Wound Size % (l*w) 0 12/10/21 1516   Wound Volume (cm^3) 0.84 cm^3 12/10/21 1516   Wound Healing % -200 12/10/21 1516   Post-Procedure Length (cm) 2.3 cm 11/22/21 1644   Post-Procedure Width (cm) 2.6 cm 11/22/21 1644   Post-Procedure Depth (cm) 0.2 cm 11/22/21 1644   Post-Procedure Surface Area (cm^2) 5.98 cm^2 11/22/21 1644   Post-Procedure Volume (cm^3) 1.196 cm^3 11/22/21 1644   Distance Tunneling (cm) 0 cm 12/10/21 1516   Tunneling Position ___ O'Clock 0 12/10/21 1516   Undermining Starts ___ O'Clock 0 12/10/21 1516   Undermining Ends___ O'Clock 0 12/10/21 1516   Undermining Maxium Distance (cm) 0 12/10/21 1516   Wound Assessment Exposed structure bone; [place]/red; [facility] 12/10/21 1516   Drainage Amount Moderate 12/10/21 1516   Drainage Description Clear 12/10/21 1516   Odor None 12/10/21 1516   Dina-wound Assessment Dry/flaky; Intact;  Maceration 12/10/21 1516   Margins Defined edges 12/10/21 1516   Wound Thickness Description not for Pressure Injury Full thickness 12/10/21 1516   Number of days: 41       Wound 10/30/21 #3 Right Great Toe (Active)   Wound Image   12/10/21 1546   Wound Etiology Diabetic 12/10/21 1546   Dressing Status New dressing applied 12/10/21 1546   Wound Cleansed Irrigated with saline 12/10/21 1516   Dressing/Treatment Hydrofera blue; ABD 11/12/21 1251   Offloading for Diabetic Foot Ulcers No offloading required 12/10/21 1546   Wound Length (cm) 0.9 cm 12/10/21 1516 Wound Width (cm) 0.9 cm 12/10/21 1516   Wound Depth (cm) 0.2 cm 12/10/21 1516   Wound Surface Area (cm^2) 0.81 cm^2 12/10/21 1516   Change in Wound Size % (l*w) -62 12/10/21 1516   Wound Volume (cm^3) 0.162 cm^3 12/10/21 1516   Wound Healing % -62 12/10/21 1516   Post-Procedure Length (cm) 0.6 cm 11/22/21 1644   Post-Procedure Width (cm) 1 cm 11/22/21 1644   Post-Procedure Depth (cm) 0.2 cm 11/22/21 1644   Post-Procedure Surface Area (cm^2) 0.6 cm^2 11/22/21 1644   Post-Procedure Volume (cm^3) 0.12 cm^3 11/22/21 1644   Distance Tunneling (cm) 0 cm 12/10/21 1516   Tunneling Position ___ O'Clock 0 12/10/21 1516   Undermining Starts ___ O'Clock 0 12/10/21 1516   Undermining Ends___ O'Clock 0 12/10/21 1516   Undermining Maxium Distance (cm) 0 12/10/21 1516   Wound Assessment [place]/red; [facility] 12/10/21 1516   Drainage Amount Moderate 12/10/21 1516   Drainage Description Clear 12/10/21 1516   Odor None 12/10/21 1516   Dina-wound Assessment Dry/flaky; Intact; Maceration 12/10/21 1516   Margins Defined edges 12/10/21 1516   Wound Thickness Description not for Pressure Injury Full thickness 12/10/21 1516   Number of days: 41       Assessment:       Problem List Items Addressed This Visit     WD-PVD (peripheral vascular disease) ([address]    WD-Diabetic ulcer of toe of right foot associated with type 2 diabetes mellitus, with fat layer exposed ([address]    WD-Diabetic ulcer of toe of left foot associated with type 2 diabetes mellitus, with fat layer exposed ([address]    WD-Chronic foot ulcer, left, with necrosis of bone ([address] - Primary          Status of wound progress and description from last visit:   Stable, but needs close f/u. Plan:     Discharge Instructions       PHYSICIAN ORDERS AND DISCHARGE INSTRUCTIONS     NOTE: Upon discharge from the [address], you will receive a patient experience survey.  We would be grateful if you would take the time to fill this survey out.     Wound care order history:                    Vascular studies: 6/8/2021 Date 11/12/2021              Imaging:               Cultures: Left 2nd toe bone obtained on 11/12/2021               Grafts:  Date               Antibiotics:               Earlier Wound care treatments:               JOSELIN care physician:      Continuing wound care orders and information:              Residence:                Continue home health care with:            Wound Medications:              FEGVE cleansing:                           CJ not scrub or use excessive force.                          Wash hands with soap and water before and after dressing changes.                         Prior to applying a clean dressing, cleanse wound with normal saline,                                wound cleanser, or mild soap and water.                           Ask the physician or nurse before getting the wound(s) wet in a shower              Daily Wound management:                          Keep weight off wounds and reposition every 2 hours.                          UQYTZ standing for long periods of time.                          Apply wraps/stockings in AM and remove at bedtime.                          If swelling is present, elevate legs to the level of the heart or above for 30                                                         minutes 4-5 times a day and/or when sitting.                                             When taking antibiotics take entire prescription as ordered by physician                                                         do not stop taking until medicine is all gone.          Wound Care Notes:            Given surgical shoes 11/22/21      Orders for this week: 12/10/2021    Right great toe, Left great toe, Left 2nd toe -- wash with soap,and water, pat dry. Moisturize both legs and feet with A+D. Apply stimulin powder to wound bed and cover with silver alginate and qwick. Wrap with conform and tape. Change daily.     Gave surgical shoes 21   Arterial referral on  2021 to [place] [facility]         Follow up with Dr Humberto Cotton in 1 week in the wound care center. Call [phone] for any questions or concerns.   Date__________   Time____________  Central [phone]        Treatment Note Wound 10/30/21   #1 Left Dorsal Great Toe-Dressing/Treatment:  (A&D, stimulen powder;ag alginate;qwick;conform;tape)  Wound 10/30/21   #2 Left Dorsal Second Toe -Dressing/Treatment:  (A&D, stimulen powder;ag alginate;qwick;conform;tape)  Wound 10/30/21 #3 Right Great Toe-Dressing/Treatment:  (A&D, stimulen powder;ag alginate;qwick;conform;tape)    Written Patient Dismissal Instructions Given            Electronically signed by Liudmila Rajput MD on 12/10/2021 at 4:26 PM

## 2021-12-22 ENCOUNTER — PROCEDURE VISIT (OUTPATIENT)
Dept: CARDIOLOGY CLINIC | Age: 71
End: 2021-12-22
Payer: MEDICARE

## 2021-12-22 ENCOUNTER — TELEPHONE (OUTPATIENT)
Dept: CARDIOLOGY CLINIC | Age: 71
End: 2021-12-22

## 2021-12-22 DIAGNOSIS — Z95.810 ICD (IMPLANTABLE CARDIOVERTER-DEFIBRILLATOR), DUAL, IN SITU: Primary | ICD-10-CM

## 2021-12-22 DIAGNOSIS — I49.5 SINUS NODE DYSFUNCTION (HCC): ICD-10-CM

## 2021-12-22 PROCEDURE — 93297 REM INTERROG DEV EVAL ICPMS: CPT | Performed by: INTERNAL MEDICINE

## 2021-12-22 PROCEDURE — 93296 REM INTERROG EVL PM/IDS: CPT | Performed by: INTERNAL MEDICINE

## 2021-12-22 PROCEDURE — 93295 DEV INTERROG REMOTE 1/2/MLT: CPT | Performed by: INTERNAL MEDICINE

## 2022-01-06 ENCOUNTER — HOSPITAL ENCOUNTER (OUTPATIENT)
Dept: WOUND CARE | Age: 72
Discharge: HOME | End: 2022-01-06
Payer: MEDICARE

## 2022-01-06 VITALS
TEMPERATURE: 98.3 F | DIASTOLIC BLOOD PRESSURE: 68 MMHG | HEART RATE: 67 BPM | RESPIRATION RATE: 18 BRPM | SYSTOLIC BLOOD PRESSURE: 103 MMHG

## 2022-01-06 DIAGNOSIS — R52 PERSISTENT WOUND PAIN: ICD-10-CM

## 2022-01-06 DIAGNOSIS — Z79.4 TYPE 2 DIABETES MELLITUS WITH DIABETIC POLYNEUROPATHY, WITH LONG-TERM CURRENT USE OF INSULIN (HCC): ICD-10-CM

## 2022-01-06 DIAGNOSIS — L97.522 DIABETIC ULCER OF TOE OF LEFT FOOT ASSOCIATED WITH TYPE 2 DIABETES MELLITUS, WITH FAT LAYER EXPOSED (HCC): Primary | ICD-10-CM

## 2022-01-06 DIAGNOSIS — E11.621 DIABETIC ULCER OF TOE OF LEFT FOOT ASSOCIATED WITH TYPE 2 DIABETES MELLITUS, WITH FAT LAYER EXPOSED (HCC): Primary | ICD-10-CM

## 2022-01-06 DIAGNOSIS — L97.524 CHRONIC FOOT ULCER, LEFT, WITH NECROSIS OF BONE (HCC): ICD-10-CM

## 2022-01-06 DIAGNOSIS — E11.621 DIABETIC ULCER OF TOE OF RIGHT FOOT ASSOCIATED WITH TYPE 2 DIABETES MELLITUS, WITH FAT LAYER EXPOSED (HCC): ICD-10-CM

## 2022-01-06 DIAGNOSIS — L97.512 DIABETIC ULCER OF TOE OF RIGHT FOOT ASSOCIATED WITH TYPE 2 DIABETES MELLITUS, WITH FAT LAYER EXPOSED (HCC): ICD-10-CM

## 2022-01-06 DIAGNOSIS — E11.42 TYPE 2 DIABETES MELLITUS WITH DIABETIC POLYNEUROPATHY, WITH LONG-TERM CURRENT USE OF INSULIN (HCC): ICD-10-CM

## 2022-01-06 PROCEDURE — 11042 DBRDMT SUBQ TIS 1ST 20SQCM/<: CPT | Performed by: NURSE PRACTITIONER

## 2022-01-06 PROCEDURE — 87077 CULTURE AEROBIC IDENTIFY: CPT

## 2022-01-06 PROCEDURE — 87186 SC STD MICRODIL/AGAR DIL: CPT

## 2022-01-06 PROCEDURE — 87075 CULTR BACTERIA EXCEPT BLOOD: CPT

## 2022-01-06 PROCEDURE — U0003 INFECTIOUS AGENT DETECTION BY NUCLEIC ACID (DNA OR RNA); SEVERE ACUTE RESPIRATORY SYNDROME CORONAVIRUS 2 (SARS-COV-2) (CORONAVIRUS DISEASE [COVID-19]), AMPLIFIED PROBE TECHNIQUE, MAKING USE OF HIGH THROUGHPUT TECHNOLOGIES AS DESCRIBED BY CMS-2020-01-R: HCPCS

## 2022-01-06 PROCEDURE — 87070 CULTURE OTHR SPECIMN AEROBIC: CPT

## 2022-01-06 PROCEDURE — 11045 DBRDMT SUBQ TISS EACH ADDL: CPT | Performed by: NURSE PRACTITIONER

## 2022-01-06 PROCEDURE — 11042 DBRDMT SUBQ TIS 1ST 20SQCM/<: CPT

## 2022-01-06 PROCEDURE — 11045 DBRDMT SUBQ TISS EACH ADDL: CPT

## 2022-01-06 NOTE — PROGRESS NOTES
Wound Care Center Progress Note With Procedure    Carlos Eduardo Fang  AGE: 70 y.o. GENDER: male  : 1950  EPISODE DATE:  2022     Subjective:     Chief Complaint   Patient presents with    Wound Check     BLE         HISTORY of PRESENT ILLNESS     Carlos Eduardo Fang is a 70 y.o. male who presents today for wound evaluation of Chronic arterial and diabetic ulcer(s) of the toes both feet (exposed bone left). The ulcer is of marked severity. The underlying cause of the wound is arterial and diabetes. The ulcer has been present for several months and he has been seeing Dr. Nancy Enciso, he has seen Dr. Vasquez Ahuja in the past. He was referred to vascular surgery at [place]. Seen by Josue PEREZ 21, she recommended to continue wound care at the wound center with recommendations to add light compression to bilateral lower extremities for edema that is contributing to nonhealing wounds. She discussed limb salvage with patient and his daughter. Due to his comorbidities he is at high risk to perform revascularization procedures. Discussed that palliative wound care may be his best option. 22 He has a new wound to the left lower leg related to venous and lymphedema, complicated by diabetes and PAD. The wound is of moderate severity.     Wound Pain Timing/Severity: constant  Quality of pain: aching, tender  Severity of pain:  6 / 10   Modifying Factors: edema, diabetes, obesity and arterial insufficiency  Associated Signs/Symptoms: edema, drainage and pain     Diabetes: Yes, on an oral insulin regimen, last A1c 7.9 as of 21  Smoking: Former smoker  Obesity: Yes  Anticoagulant therapy: Plavix  Immunosuppression: No  Other History: PVD s/p PTA of left SFA in the past, CKD stage III followed by Dr. Sole Warner, he has CAD with ICD placement     Patient educated on the 6 essential components necessary for wound healing: Circulation, Debridements, Proper Dressings and Topical Wound Products, Infection Control, Edema Control and Offloading.     Patient educated on those factors that negatively effect or impact wound healing: smoking, obesity, uncontrolled diabetes, anticoagulant and immunosuppressive regimens, inadequate nutrition, untreated arterial and venous disease if applicable and measures to manage edema. PAST MEDICAL HISTORY        Diagnosis Date    Acid reflux     Acute MI [facility]) 2004, 2008    Arthritis     Back    Broken teeth     Upper Front    CAD (coronary artery disease)     Sees Dr. Evan Barber [facility])     per old chart    CH[facility]estive [facility] failure) [facility]     Chronic back pain     Chronic kidney disease     STAGE 3 KIDNEY FAILURE- \"from my diabetes- do not follow with any one- have seen Dr Kuldeep Hart in the past\"    Diabetes mellitus ([facility] Dx 1965    per old chart pt has been diabetic since age 13    Diabetic neuropathy ([facility]     \"in my feet\"    H/O cardiovascular stress test 08/25/2016    H/O Doppler ultrasound 09/27/2018    Moderate disease of the right lower extremity with an JALEN of 0.72.   Moderate to severe disease of the left lower extremity with an JALEN of 0.55.   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    Neuropathy     both feet    neuropathy     PAD (peripheral artery disease) ([address] 09/27/2018    PVD (peripheral vascular disease) ([address]     Sick sinus syndrome ([address]     Sleep apnea     \"sleep study 3 yrs ago- could not tolerate the cpap made me too dry\"    Spinal stenosis     Teeth missing     Upper And Lower    Type 2 diabetes mellitus without complication ([address]     WD-Chronic foot ulcer, left, with necrosis of bone ([address] 11/12/2021       PAST SURGICAL HISTORY    Past Surgical History:   Procedure Laterality Date    CARDIAC CATHETERIZATION      per old chart done 10/2014    CARDIAC CATHETERIZATION  07/14/2017    with angiography of leg    CARDIAC CATHETERIZATION  11/20/2018    PATENT STENTS OF ALL THREE MAJOR VESSELS    CARDIAC DEFIBRILLATOR PLACEMENT  06/04/2010    [employer] [address] Defibrillator Implanted    COLECTOMY Right 08/26/2016    laparascopic; robotic assisted    COLONOSCOPY  08/04/2016    CORONARY ANGIOPLASTY      \"15 Heart Stents\"    CORONARY ANGIOPLASTY WITH STENT PLACEMENT      per old chart had angio with stent to circumflex and obtuse marginal artery at [facility] 5/2010( old chart also gives hx of stent placement done 2000,2004 and 2005)    DENTAL SURGERY      Teeth Extracted In Past    IR TUNNELED CATHETER PLACEMENT GREATER THAN 5 YEARS  6/14/2021    IR TUNNELED CATHETER PLACEMENT GREATER THAN 5 YEARS 6/14/2021 [facility] SPECIAL PROCEDURES    PACEMAKER PLACEMENT  06/04/2010    [employer] [address] DR Defibrillator Implanted    TOE AMPUTATION Right 09/12/2017    Rt 3rd toe    TOE AMPUTATION Right 01/09/2018     Right 5th toe amputation and Toenails trimmed left 2,3,4 and 5th toes    TOE AMPUTATION Left 12/26/2020    LEFT GREAT TOE AMPUTATION performed by Jordan Duvall MD at [address]      per old chart had balloon angioplasty right superfical femoral artery,right popliteal artery,,right ant.tibial artery, right tibioperoneal trunk, and right post.tibial artery wna stent placement right popliteal artery and superfical femoral artery 7/2012       FAMILY HISTORY    Family History   Problem Relation Age of Onset    Diabetes Mother     Stroke Mother     High Blood Pressure Mother     Vision Loss Mother     Cancer Father         Prostate Cancer    Diabetes Sister     Neuropathy Sister     Other Sister         \"Breathing Problems\"    Heart Disease Sister     Early Death Sister 62        Heart Complications    Cancer Brother         \"Stomach Cancer\"    High Blood Pressure Brother     Diabetes Brother     Heart Disease Brother     High Blood Pressure Brother     Cancer Son         \"Testicle Cancer\"       SOCIAL HISTORY    Social History     Tobacco Use    Smoking status: Former Smoker     Packs/day: 0.25     Years: 36.00     Pack years: 9.00     Types: Cigars     Start date: 1980     Quit date: 10/22/2021     Years since quittin.2    Smokeless tobacco: Never Used    Tobacco comment: Client states he has stopped smoking   Vaping Use    Vaping Use: Never used   Substance Use Topics    Alcohol use: No    Drug use: Yes     Types: Marijuana (Weed)       ALLERGIES    Allergies   Allergen Reactions    Pcn [Penicillins] Hives    Fentanyl Itching       MEDICATIONS    Current Outpatient Medications on File Prior to Encounter   Medication Sig Dispense Refill    diclofenac sodium (VOLTAREN) 1 % GEL Apply 4 g topically 2 times daily 120 g 5    gabapentin (NEURONTIN) 300 MG capsule TAKE 1 CAPSULE BY MOUTH 3 TIMES DAILY FOR 90 DAYS.  90 capsule 3    amLODIPine (NORVASC) 10 MG tablet Take 1 tablet by mouth daily 30 tablet 5    doxazosin (CARDURA) 4 MG tablet Take 1 tablet by mouth 2 times daily 60 tablet 3    metaxalone (SKELAXIN) 800 MG tablet Take 800 mg by mouth 3 times daily      SPIRIVA HANDIHALER 18 MCG inhalation capsule       insulin lispro, 1 Unit Dial, (HUMALOG KWIKPEN) 100 UNIT/ML SOPN Inject into the skin 2 times daily Sliding scale dose      chlorthalidone (HYGROTEN) 50 MG tablet Take 1 tablet by mouth daily 30 tablet 0    atorvastatin (LIPITOR) 40 MG tablet Take 1 tablet by mouth nightly 30 tablet 3    carvedilol (COREG) 3.125 MG tablet Take 1 tablet by mouth 2 times daily 60 tablet 3    albuterol sulfate HFA (VENTOLIN HFA) 108 (90 Base) MCG/ACT inhaler Inhale 2 puffs into the lungs every 4 hours as needed for Measurements:  Are located in the Wound Documentation Flow Sheet    All active wounds listed below with today's date are evaluated  Wound(s)    debrided this date include # : 1, 2, 3 and 4     Post  Debridement Measurements:  Wound 10/30/21   #1 Left Dorsal Great Toe (Active)   Wound Image   12/10/21 1546   Wound Etiology Diabetic 01/06/22 1114   Dressing Status New dressing applied 12/10/21 1546   Wound Cleansed Soap and water 01/06/22 1114   Dressing/Treatment Hydrofera blue;ABD 11/12/21 1251   Offloading for Diabetic Foot Ulcers No offloading required 01/06/22 1114   Wound Length (cm) 1.5 cm 01/06/22 1114   Wound Width (cm) 2 cm 01/06/22 1114   Wound Depth (cm) 0.5 cm 01/06/22 1114   Wound Surface Area (cm^2) 3 cm^2 01/06/22 1114   Change in Wound Size % (l*w) 4.76 01/06/22 1114   Wound Volume (cm^3) 1.5 cm^3 01/06/22 1114   Wound Healing % -138 01/06/22 1114   Post-Procedure Length (cm) 1.5 cm 01/06/22 1128   Post-Procedure Width (cm) 2 cm 01/06/22 1128   Post-Procedure Depth (cm) 0.5 cm 01/06/22 1128   Post-Procedure Surface Area (cm^2) 3 cm^2 01/06/22 1128   Post-Procedure Volume (cm^3) 1.5 cm^3 01/06/22 1128   Distance Tunneling (cm) 0 cm 01/06/22 1114   Tunneling Position ___ O'Clock 0 01/06/22 1114   Undermining Starts ___ O'Clock 0 01/06/22 1114   Undermining Ends___ O'Clock 0 01/06/22 1114   Undermining Maxium Distance (cm) 0 01/06/22 1114   Wound Assessment Slough;Pink/red 01/06/22 1114   Drainage Amount Moderate 01/06/22 1114   Drainage Description Clear 01/06/22 1114   Odor None 01/06/22 1114   Dina-wound Assessment Maceration;Dry/flaky 01/06/22 1114   Margins Defined edges 01/06/22 1114   Wound Thickness Description not for Pressure Injury Full thickness 01/06/22 1114   Number of days: 68       Wound 10/30/21   #2 Left Dorsal Second Toe  (Active)   Wound Image   12/10/21 1546   Wound Etiology Diabetic 01/06/22 1114   Dressing Status New dressing applied 12/10/21 1546   Wound Cleansed Soap and water 01/06/22 1114   Dressing/Treatment Hydrofera blue;ABD 11/12/21 1251   Offloading for Diabetic Foot Ulcers No offloading required 01/06/22 1114   Wound Length (cm) 2 cm 01/06/22 1114   Wound Width (cm) 1.5 cm 01/06/22 1114   Wound Depth (cm) 0.3 cm 01/06/22 1114   Wound Surface Area (cm^2) 3 cm^2 01/06/22 1114   Change in Wound Size % (l*w) -7.14 01/06/22 1114   Wound Volume (cm^3) 0.9 cm^3 01/06/22 1114   Wound Healing % -221 01/06/22 1114   Post-Procedure Length (cm) 2 cm 01/06/22 1128   Post-Procedure Width (cm) 1.5 cm 01/06/22 1128   Post-Procedure Depth (cm) 0.3 cm 01/06/22 1128   Post-Procedure Surface Area (cm^2) 3 cm^2 01/06/22 1128   Post-Procedure Volume (cm^3) 0.9 cm^3 01/06/22 1128   Distance Tunneling (cm) 0 cm 01/06/22 1114   Tunneling Position ___ O'Clock 0 01/06/22 1114   Undermining Starts ___ O'Clock 0 01/06/22 1114   Undermining Ends___ O'Clock 0 01/06/22 1114   Undermining Maxium Distance (cm) 0 01/06/22 1114   Wound Assessment Exposed structure bone;Pink/red;Slough 01/06/22 1114   Drainage Amount Moderate 01/06/22 1114   Drainage Description Clear 01/06/22 1114   Odor None 01/06/22 1114   Dina-wound Assessment Dry/flaky; Intact; Maceration 01/06/22 1114   Margins Defined edges 01/06/22 1114   Wound Thickness Description not for Pressure Injury Full thickness 01/06/22 1114   Number of days: 68       Wound 10/30/21 #3 Right Great Toe (Active)   Wound Image   12/10/21 1546   Wound Etiology Diabetic 01/06/22 1114   Dressing Status New dressing applied 12/10/21 1546   Wound Cleansed Soap and water 01/06/22 1114   Dressing/Treatment Hydrofera blue;ABD 11/12/21 1251   Offloading for Diabetic Foot Ulcers No offloading required 01/06/22 1114   Wound Length (cm) 1 cm 01/06/22 1114   Wound Width (cm) 1.1 cm 01/06/22 1114   Wound Depth (cm) 0.2 cm 01/06/22 1114   Wound Surface Area (cm^2) 1.1 cm^2 01/06/22 1114   Change in Wound Size % (l*w) -120 01/06/22 1114   Wound Volume (cm^3) 0.22 cm^3 01/06/22 1114 Wound Healing % -120 01/06/22 1114   Post-Procedure Length (cm) 1 cm 01/06/22 1128   Post-Procedure Width (cm) 1.1 cm 01/06/22 1128   Post-Procedure Depth (cm) 0.2 cm 01/06/22 1128   Post-Procedure Surface Area (cm^2) 1.1 cm^2 01/06/22 1128   Post-Procedure Volume (cm^3) 0.22 cm^3 01/06/22 1128   Distance Tunneling (cm) 0 cm 01/06/22 1114   Tunneling Position ___ O'Clock 0 01/06/22 1114   Undermining Starts ___ O'Clock 0 01/06/22 1114   Undermining Ends___ O'Clock 0 01/06/22 1114   Undermining Maxium Distance (cm) 0 01/06/22 1114   Wound Assessment Pink/red;Slough 01/06/22 1114   Drainage Amount Moderate 01/06/22 1114   Drainage Description Clear 01/06/22 1114   Odor None 01/06/22 1114   Dina-wound Assessment Dry/flaky; Intact; Maceration 01/06/22 1114   Margins Defined edges 01/06/22 1114   Wound Thickness Description not for Pressure Injury Full thickness 01/06/22 1114   Number of days: 68       Wound 01/06/22 Pretibial Left;Posterior # 4 (Active)   Wound Etiology Venous 01/06/22 1128   Wound Length (cm) 10 cm 01/06/22 1128   Wound Width (cm) 12 cm 01/06/22 1128   Wound Depth (cm) 0.1 cm 01/06/22 1128   Wound Surface Area (cm^2) 120 cm^2 01/06/22 1128   Wound Volume (cm^3) 12 cm^3 01/06/22 1128   Post-Procedure Length (cm) 10 cm 01/06/22 1129   Post-Procedure Width (cm) 12 cm 01/06/22 1129   Post-Procedure Depth (cm) 0.1 cm 01/06/22 1129   Post-Procedure Surface Area (cm^2) 120 cm^2 01/06/22 1129   Post-Procedure Volume (cm^3) 12 cm^3 01/06/22 1129   Distance Tunneling (cm) 0 cm 01/06/22 1128   Tunneling Position ___ O'Clock 0 01/06/22 1128   Undermining Starts ___ O'Clock 0 01/06/22 1128   Undermining Ends___ O'Clock 0 01/06/22 1128   Undermining Maxium Distance (cm) 0 01/06/22 1128   Wound Assessment Ruptured blister 01/06/22 1128   Drainage Amount Moderate 01/06/22 1128   Drainage Description Serous 01/06/22 1128   Odor None 01/06/22 1128   Dina-wound Assessment Intact 01/06/22 1128   Margins Attached edges 01/06/22 1128   Wound Thickness Description not for Pressure Injury Full thickness 01/06/22 1128   Number of days: 0       Percent of Wound(s) Debrided: approximately 100%    Total  Area  Debrided:  120 sq cm     Bleeding:  Minimal    Hemostasis Achieved:  by pressure    Procedural Pain:  0  / 10     Post Procedural Pain:  0 / 10     Response to treatment:  Well tolerated by patient. Status of wound progress and description from last visit: The wounds are larger today, cultured the left toe wounds. New wound left lower leg, will use Gent topically to minimize bio-burden and collagen to build tissue. Keep skin clean and dry. Discussed local wound care and signs of infection. .    Plan:       Discharge Instructions       PHYSICIAN ORDERS AND DISCHARGE INSTRUCTIONS     NOTE: Upon discharge from the [address], you will receive a patient experience survey. We would be grateful if you would take the time to fill this survey out.     Wound care order history:                    Vascular studies: 6/8/2021 Date 11/12/2021              Imaging:               Cultures: Left 2nd toe bone obtained on 11/12/2021               Grafts:  Date               Antibiotics:               Earlier Wound care treatments:               [id number] care physician:      Continuing wound care orders and information:              Residence:                Continue home health care with:            Wound Medications:              TJSEU cleansing:                           KX not scrub or use excessive force.                          Wash hands with soap and water before and after dressing changes.                           Prior to applying a clean dressing, cleanse wound with normal saline,                                wound cleanser, or mild soap and water.                           Ask the physician or nurse before getting the wound(s) wet in a shower              Daily Wound management:                          Keep weight off wounds and

## 2022-01-11 LAB
CULTURE: ABNORMAL
Lab: ABNORMAL
SPECIMEN: ABNORMAL

## 2022-01-16 ENCOUNTER — HOSPITAL ENCOUNTER (EMERGENCY)
Age: 72
Discharge: HOME | End: 2022-01-17
Attending: EMERGENCY MEDICINE
Payer: MEDICARE

## 2022-01-16 DIAGNOSIS — R60.0 BILATERAL LOWER EXTREMITY EDEMA: Primary | ICD-10-CM

## 2022-01-16 PROCEDURE — 99285 EMERGENCY DEPT VISIT HI MDM: CPT

## 2022-01-16 ASSESSMENT — PAIN DESCRIPTION - LOCATION: LOCATION: TOE (COMMENT WHICH ONE)

## 2022-01-16 ASSESSMENT — PAIN DESCRIPTION - PAIN TYPE: TYPE: ACUTE PAIN

## 2022-01-16 ASSESSMENT — PAIN SCALES - GENERAL: PAINLEVEL_OUTOF10: 10

## 2022-01-17 ENCOUNTER — APPOINTMENT (OUTPATIENT)
Dept: GENERAL RADIOLOGY | Age: 72
End: 2022-01-17
Payer: MEDICARE

## 2022-01-17 VITALS
RESPIRATION RATE: 18 BRPM | OXYGEN SATURATION: 96 % | TEMPERATURE: 97 F | HEART RATE: 57 BPM | DIASTOLIC BLOOD PRESSURE: 78 MMHG | SYSTOLIC BLOOD PRESSURE: 129 MMHG

## 2022-01-17 LAB
ALBUMIN SERPL-MCNC: 3 GM/DL (ref 3.4–5)
ALP BLD-CCNC: 151 IU/L (ref 40–128)
ALT SERPL-CCNC: 11 U/L (ref 10–40)
ANION GAP SERPL CALCULATED.3IONS-SCNC: 8 MMOL/L (ref 4–16)
AST SERPL-CCNC: 19 IU/L (ref 15–37)
BASOPHILS ABSOLUTE: 0 K/CU MM
BASOPHILS RELATIVE PERCENT: 0.5 % (ref 0–1)
BILIRUB SERPL-MCNC: 0.4 MG/DL (ref 0–1)
BUN BLDV-MCNC: 21 MG/DL (ref 6–23)
CALCIUM SERPL-MCNC: 8 MG/DL (ref 8.3–10.6)
CHLORIDE BLD-SCNC: 105 MMOL/L (ref 99–110)
CO2: 24 MMOL/L (ref 21–32)
CREAT SERPL-MCNC: 2 MG/DL (ref 0.9–1.3)
DIFFERENTIAL TYPE: ABNORMAL
EKG ATRIAL RATE: 60 BPM
EKG DIAGNOSIS: NORMAL
EKG P-R INTERVAL: 280 MS
EKG Q-T INTERVAL: 460 MS
EKG QRS DURATION: 102 MS
EKG QTC CALCULATION (BAZETT): 460 MS
EKG R AXIS: 36 DEGREES
EKG T AXIS: 160 DEGREES
EKG VENTRICULAR RATE: 60 BPM
EOSINOPHILS ABSOLUTE: 0.1 K/CU MM
EOSINOPHILS RELATIVE PERCENT: 0.8 % (ref 0–3)
GFR AFRICAN AMERICAN: 40 ML/MIN/1.73M2
GFR NON-AFRICAN AMERICAN: 33 ML/MIN/1.73M2
GLUCOSE BLD-MCNC: 317 MG/DL (ref 70–99)
HCT VFR BLD CALC: 31.1 % (ref 42–52)
HEMOGLOBIN: 9.3 GM/DL (ref 13.5–18)
IMMATURE NEUTROPHIL %: 0.3 % (ref 0–0.43)
LYMPHOCYTES ABSOLUTE: 0.9 K/CU MM
LYMPHOCYTES RELATIVE PERCENT: 13.7 % (ref 24–44)
MCH RBC QN AUTO: 25.5 PG (ref 27–31)
MCHC RBC AUTO-ENTMCNC: 29.9 % (ref 32–36)
MCV RBC AUTO: 85.4 FL (ref 78–100)
MONOCYTES ABSOLUTE: 0.4 K/CU MM
MONOCYTES RELATIVE PERCENT: 6.4 % (ref 0–4)
NUCLEATED RBC %: 0 %
PDW BLD-RTO: 17.4 % (ref 11.7–14.9)
PLATELET # BLD: 195 K/CU MM (ref 140–440)
PMV BLD AUTO: 10 FL (ref 7.5–11.1)
POTASSIUM SERPL-SCNC: 5.2 MMOL/L (ref 3.5–5.1)
PRO-BNP: 9682 PG/ML
RBC # BLD: 3.64 M/CU MM (ref 4.6–6.2)
SEGMENTED NEUTROPHILS ABSOLUTE COUNT: 4.9 K/CU MM
SEGMENTED NEUTROPHILS RELATIVE PERCENT: 78.3 % (ref 36–66)
SODIUM BLD-SCNC: 137 MMOL/L (ref 135–145)
TOTAL IMMATURE NEUTOROPHIL: 0.02 K/CU MM
TOTAL NUCLEATED RBC: 0 K/CU MM
TOTAL PROTEIN: 6.6 GM/DL (ref 6.4–8.2)
TROPONIN T: 0.03 NG/ML
WBC # BLD: 6.3 K/CU MM (ref 4–10.5)

## 2022-01-17 PROCEDURE — 93010 ELECTROCARDIOGRAM REPORT: CPT | Performed by: INTERNAL MEDICINE

## 2022-01-17 PROCEDURE — 93005 ELECTROCARDIOGRAM TRACING: CPT | Performed by: EMERGENCY MEDICINE

## 2022-01-17 PROCEDURE — 80053 COMPREHEN METABOLIC PANEL: CPT

## 2022-01-17 PROCEDURE — 71045 X-RAY EXAM CHEST 1 VIEW: CPT

## 2022-01-17 PROCEDURE — 84484 ASSAY OF TROPONIN QUANT: CPT

## 2022-01-17 PROCEDURE — 83880 ASSAY OF NATRIURETIC PEPTIDE: CPT

## 2022-01-17 PROCEDURE — 6370000000 HC RX 637 (ALT 250 FOR IP): Performed by: EMERGENCY MEDICINE

## 2022-01-17 PROCEDURE — 85025 COMPLETE CBC W/AUTO DIFF WBC: CPT

## 2022-01-17 RX ORDER — FUROSEMIDE 20 MG/1
40 TABLET ORAL ONCE
Status: COMPLETED | OUTPATIENT
Start: 2022-01-17 | End: 2022-01-17

## 2022-01-17 RX ORDER — HYDROCODONE BITARTRATE AND ACETAMINOPHEN 5; 325 MG/1; MG/1
1-2 TABLET ORAL EVERY 6 HOURS PRN
Qty: 6 TABLET | Refills: 0 | Status: SHIPPED | OUTPATIENT
Start: 2022-01-17 | End: 2022-01-20

## 2022-01-17 RX ORDER — HYDROCODONE BITARTRATE AND ACETAMINOPHEN 5; 325 MG/1; MG/1
1 TABLET ORAL ONCE
Status: COMPLETED | OUTPATIENT
Start: 2022-01-17 | End: 2022-01-17

## 2022-01-17 RX ADMIN — HYDROCODONE BITARTRATE AND ACETAMINOPHEN 1 TABLET: 5; 325 TABLET ORAL at 01:46

## 2022-01-17 RX ADMIN — FUROSEMIDE 40 MG: 20 TABLET ORAL at 05:20

## 2022-01-17 ASSESSMENT — PAIN SCALES - GENERAL: PAINLEVEL_OUTOF10: 10

## 2022-01-17 NOTE — ED PROVIDER NOTES
Triage Chief Complaint:   Testicle Swelling (since wednesday ) and Leg Swelling (bilateral )    Ak Chin:  Chriss Paige is a 70 y.o. male that presents with main complaint of bilateral lower extremity pain and swelling. States that he has had worsening symptoms over the last few weeks to few months. Patient has chronic swelling in his legs and ulcerative lesions on his feet and has been told that he will likely need his feet amputated at some point. States that over the past week or 2 though the swelling has been worsening going up into his legs, penis and testicles. States that he has become more short of breath especially with lying down or with exertion. He does use a walker for ambulation but fell this morning because he has become weak and has become more difficult to walk. Denies any chest pain, fever, nausea, vomiting, diarrhea, abdominal pain. He has been compliant with his medications. Denies any recent medication changes. ROS:  At least 10 systems reviewed and otherwise acutely negative except as in the [address] Past Medical History:   Diagnosis Date    Acid reflux     Acute MI ([facility] 2004, 2008    Arthritis     Back    Broken teeth     Upper Front    CAD (coronary artery disease)     Sees Dr. Trujillo [facility])     per old chart    CHF (congestive heart failure) ([facility]     Chronic back pain     Chronic kidney disease     STAGE 3 KIDNEY FAILURE- \"from my diabetes- do not follow with any one- have seen Dr Kuldeep Hart in the past\"    Diabetes mellitus [facility]) Dx 1965    per old chart pt has been diabetic since age 13    Diabetic neuropathy ([facility]     \"in my feet\"    H/O cardiovascular stress test 08/25/2016    H/O Doppler ultrasound 09/27/2018    Moderate disease of the right lower extremity with an JALEN of 0.72. Moderate to severe disease of the left lower extremity with an JALEN of 0.55.     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    Neuropathy     both feet    neuropathy     PAD (peripheral artery disease) ([address] 09/27/2018    PVD (peripheral vascular disease) ([address]     Sick sinus syndrome (HCC)     Sleep apnea     \"sleep study 3 yrs ago- could not tolerate the cpap made me too dry\"    Spinal stenosis     Teeth missing     Upper And Lower    Type 2 diabetes mellitus without complication ([address]     WD-Chronic foot ulcer, left, with necrosis of bone ([address] 11/12/2021     Past Surgical History:   Procedure Laterality Date    CARDIAC CATHETERIZATION      per old chart done 10/2014    CARDIAC CATHETERIZATION  07/14/2017    with angiography of leg    CARDIAC CATHETERIZATION  11/20/2018    PATENT STENTS OF ALL THREE MAJOR VESSELS    CARDIAC DEFIBRILLATOR PLACEMENT  06/04/2010    Medtronic Secura  Defibrillator Implanted    COLECTOMY Right 08/26/2016    laparascopic; robotic assisted    COLONOSCOPY  08/04/2016    CORONARY ANGIOPLASTY      \"15 Heart Stents\"    CORONARY ANGIOPLASTY WITH STENT PLACEMENT      per old chart had angio with stent to circumflex and obtuse marginal artery at [facility] 5/2010( old chart also gives hx of stent placement done 2000,2004 and 2005)    DENTAL SURGERY      Teeth Extracted In Past    IR TUNNELED CATHETER PLACEMENT GREATER THAN 5 YEARS  6/14/2021    IR TUNNELED CATHETER PLACEMENT GREATER THAN 5 YEARS 6/14/2021 [facility]    PACEMAKER PLACEMENT  06/04/2010    Medtronic Secura DR Defibrillator Implanted    TOE AMPUTATION Right 09/12/2017    Rt 3rd toe    TOE AMPUTATION Right 01/09/2018     Right 5th toe amputation and Toenails trimmed left 2,3,4 and 5th toes    TOE AMPUTATION Left 12/26/2020    LEFT GREAT TOE AMPUTATION performed by Wisam Churchill MD at [address] [place] [address]      per old chart had balloon angioplasty right superfical femoral artery,right popliteal artery,,right ant.tibial artery, right tibioperoneal trunk, and right post.tibial artery wna stent placement right popliteal artery and superfical femoral artery 2012     Family History   Problem Relation Age of Onset    Diabetes Mother     Stroke Mother     High Blood Pressure Mother     Vision Loss Mother     Cancer Father         Prostate Cancer    Diabetes Sister     Neuropathy Sister     Other Sister         \"Breathing Problems\"    Heart Disease Sister     Early Death Sister 62        Heart Complications    Cancer Brother         \"Stomach Cancer\"    High Blood Pressure Brother     Diabetes Brother     Heart Disease Brother     High Blood Pressure Brother     Cancer Son         \"Testicle Cancer\"     Social History     Socioeconomic History    Marital status:       Spouse name: Not on file    Number of children: Not on file    Years of education: Not on file    Highest education level: Not on file   Occupational History    Not on file   Tobacco Use    Smoking status: Former Smoker     Packs/day: 0.25     Years: 36.00     Pack years: 9.00     Types: Cigars     Start date: 1980     Quit date: 10/22/2021     Years since quittin.2    Smokeless tobacco: Never Used    Tobacco comment: Client states he has stopped smoking   Vaping Use    Vaping Use: Never used   Substance and Sexual Activity    Alcohol use: No    Drug use: Yes     Types: Marijuana [employer])    Sexual activity: Never   Other Topics Concern    Not on file   Social History Narrative    Not on file     Social Determinants of Health     Financial Resource Strain:     Difficulty of Paying Living Expenses: Not on file   Food Insecurity:     Worried About [address] in the Last Year: Not on file    Vidya of Food in the Last Year: Not on file   Transportation Needs: Lack of Transportation (Medical): Not on file    Lack of Transportation (Non-Medical): Not on file   Physical Activity:     Days of Exercise per Week: Not on file    Minutes of Exercise per Session: Not on file   Stress:     Feeling of Stress : Not on file   Social Connections:     Frequency of Communication with Friends and Family: Not on file    Frequency of Social Gatherings with Friends and Family: Not on file    Attends Caodaism Services: Not on file    Active Member of [address] [place] [address] or Organizations: Not on file    Attends Club or Organization Meetings: Not on file    Marital Status: Not on file   Intimate Partner Violence:     Fear of Current or Ex-Partner: Not on file    Emotionally Abused: Not on file    Physically Abused: Not on file    Sexually Abused: Not on file   Housing Stability:     Unable to Pay for Housing in the Last Year: Not on file    Number of [place] in the Last Year: Not on file    Unstable Housing in the Last Year: Not on file     Current Facility-Administered Medications   Medication Dose Route Frequency Provider Last Rate Last Admin    furosemide (LASIX) tablet 40 mg  40 mg Oral Once Sedrick Tobias MD         Current Outpatient Medications   Medication Sig Dispense Refill    HYDROcodone-acetaminophen (NORCO) 5-325 MG per tablet Take 1-2 tablets by mouth every 6 hours as needed for Pain for up to 3 days. 6 tablet 0    diclofenac sodium (VOLTAREN) 1 % GEL Apply 4 g topically 2 times daily 120 g 5    gabapentin (NEURONTIN) 300 MG capsule TAKE 1 CAPSULE BY MOUTH 3 TIMES DAILY FOR 90 DAYS.  90 capsule 3    amLODIPine (NORVASC) 10 MG tablet Take 1 tablet by mouth daily 30 tablet 5    doxazosin (CARDURA) 4 MG tablet Take 1 tablet by mouth 2 times daily 60 tablet 3    metaxalone (SKELAXIN) 800 MG tablet Take 800 mg by mouth 3 times daily      SPIRIVA HANDIHALER 18 MCG inhalation capsule       insulin lispro, 1 Unit Dial, (HUMALOG KWIKPEN) 100 UNIT/ML SOPN Inject into the skin 2 times daily Sliding scale dose      chlorthalidone (HYGROTEN) 50 MG tablet Take 1 tablet by mouth daily 30 tablet 0    atorvastatin (LIPITOR) 40 MG tablet Take 1 tablet by mouth nightly 30 tablet 3    carvedilol (COREG) 3.125 MG tablet Take 1 tablet by mouth 2 times daily 60 tablet 3    albuterol sulfate HFA (VENTOLIN HFA) 108 (90 Base) MCG/ACT inhaler Inhale 2 puffs into the lungs every 4 hours as needed for Wheezing 1 Inhaler 3    Calcium Alginate (ALGICELL CALCIUM DRESSING 4\"X8) MISC Apply 1 Device topically daily 30 each 3    PROMOGRAN COREY WOUND DRESSING MATRIX Apply topically Apply to left daily and cover with gauze 1 Package 3    clopidogrel (PLAVIX) 75 MG tablet Take 1 tablet by mouth daily 30 tablet 11     Allergies   Allergen Reactions    Pcn [Penicillins] Hives    Fentanyl Itching       Nursing Notes Reviewed    Physical Exam:  ED Triage Vitals [01/16/22 8532]   Enc Vitals Group      /74      Pulse 61      Resp 18      Temp 97 °F (36.1 °C)      Temp Source Oral      SpO2 97 %      Weight       Height       Head Circumference       Peak Flow       Pain Score       Pain Loc       Pain Edu? Excl. in [address]? GENERAL APPEARANCE: Awake and alert. Cooperative. No acute distress. HEAD: Normocephalic. Atraumatic. EYES: EOM's grossly intact. Sclera anicteric. ENT: Mucous membranes are moist. Tolerates saliva. No trismus. NECK: Supple. No meningismus. Trachea midline. HEART: RRR. Radial pulses 2+. LUNGS: Respirations unlabored. Lung sounds diminished bilaterally  ABDOMEN: Soft. Non-tender. No guarding or rebound. EXTREMITIES: Lower extremity lymphedema and swelling without any obvious pitting. Dressings covering distal portions of feet bilaterally. No erythema or induration. Lichenification of the skin  SKIN: Warm and dry. NEUROLOGICAL: No gross facial drooping. Moves all 4 extremities spontaneously. PSYCHIATRIC: Normal mood.     I have reviewed and interpreted all of the currently available lab results from this visit (if applicable):  Results for orders placed or performed during the hospital encounter of 01/16/22   CBC Auto Differential   Result Value Ref Range    WBC 6.3 4.0 - 10.5 K/CU MM    RBC 3.64 (L) 4.6 - 6.2 M/CU MM    Hemoglobin 9.3 (L) 13.5 - 18.0 GM/DL    Hematocrit 31.1 (L) 42 - 52 %    MCV 85.4 78 - 100 FL    MCH 25.5 (L) 27 - 31 PG    MCHC 29.9 (L) 32.0 - 36.0 %    RDW 17.4 (H) 11.7 - 14.9 %    Platelets 669 240 - 018 K/CU MM    MPV 10.0 7.5 - 11.1 FL    Differential Type AUTOMATED DIFFERENTIAL     Segs Relative 78.3 (H) 36 - 66 %    Lymphocytes % 13.7 (L) 24 - 44 %    Monocytes % 6.4 (H) 0 - 4 %    Eosinophils % 0.8 0 - 3 %    Basophils % 0.5 0 - 1 %    Segs Absolute 4.9 K/CU MM    Lymphocytes Absolute 0.9 K/CU MM    Monocytes Absolute 0.4 K/CU MM    Eosinophils Absolute 0.1 K/CU MM    Basophils Absolute 0.0 K/CU MM    Nucleated RBC % 0.0 %    Total Nucleated RBC 0.0 K/CU MM    Total Immature Neutrophil 0.02 K/CU MM    Immature Neutrophil % 0.3 0 - 0.43 %   Comprehensive Metabolic Panel w/ Reflex to MG   Result Value Ref Range    Sodium 137 135 - 145 MMOL/L    Potassium 5.2 (H) 3.5 - 5.1 MMOL/L    Chloride 105 99 - 110 mMol/L    CO2 24 21 - 32 MMOL/L    BUN 21 6 - 23 MG/DL    CREATININE 2.0 (H) 0.9 - 1.3 MG/DL    Glucose 317 (H) 70 - 99 MG/DL    Calcium 8.0 (L) 8.3 - 10.6 MG/DL    Albumin 3.0 (L) 3.4 - 5.0 GM/DL    Total Protein 6.6 6.4 - 8.2 GM/DL    Total Bilirubin 0.4 0.0 - 1.0 MG/DL    ALT 11 10 - 40 U/L    AST 19 15 - 37 IU/L    Alkaline Phosphatase 151 (H) 40 - 128 IU/L    GFR Non- 33 (L) >60 mL/min/1.73m2    GFR  40 (L) >60 mL/min/1.73m2    Anion Gap 8 4 - 16   Troponin   Result Value Ref Range    Troponin T 0.029 (H) <0.01 NG/ML   Brain Natriuretic Peptide   Result Value Ref Range    Pro-BNP 9,682 (H) <300 PG/ML   EKG 12 Lead   Result Value Ref Range    Ventricular Rate 60 BPM    Atrial Rate 60 BPM    P-R Interval 280 ms    QRS Duration 102 ms    Q-T Interval 460 ms    QTc Calculation (Bazett) 460 ms    R Axis 36 degrees    T Axis 160 degrees    Diagnosis       Atrial-paced rhythm with prolonged AV conduction  Anterior infarct (cited on or before 28-OCT-2021)  Abnormal ECG  When compared with ECG of 28-OCT-2021 21:30,  premature supraventricular complexes are no longer present  Nonspecific T wave abnormality now evident in Anterior leads        Radiographs (if obtained):  [] The following radiograph was interpreted by myself in the absence of a radiologist:  [x] Radiologist's Report Reviewed:    EKG (if obtained): (All EKG's are interpreted by myself in the absence of a cardiologist)  Atrial paced rhythm with prolonged AV conduction. Normal axis. No specific ST or T wave changes. No pathologic Q waves. QTc is normal.  MDM:  Plan of care is discussed thoroughly with the patient and family if present. If performed, all imaging and lab work also discussed with patient. All relevant prior results and chart reviewed if available. Patient presents as above. He presents with acute on chronic bilateral lower extremity edema shortness of breath. He is not in respiratory distress and is not hypoxic here. He has chronic wounds with dressings on his lower extremities at this point. Patient is afebrile. Vital signs are within normal ranges. Denies any chest pain at this time. EKG does not show any ischemic changes. Metabolic work-up is unremarkable. He has chronically elevated troponin not significantly changed from prior. Chest x-ray does not show any evidence of large effusions or pulmonary edema. Patient given 1 dose of Lasix here to assist with some diuresis but I do not feel that he needs admitted at this time given his overall picture. He agrees to follow-up closely with his cardiologist to discuss further management. Discharged in good condition. Clinical Impression:  1.  Bilateral lower extremity edema      (Please note that portions of this note may have been completed with a voice recognition program. Efforts were made to edit the dictations but occasionally words are mis-transcribed.)    MD Janette Monsivais MD  01/17/22 9912

## 2022-01-17 NOTE — ED NOTES
Discharge instructions reviewed  With pt. All questions answered. Transportation is being set up for pt.       Sunny Severe, RN  01/17/22 0443

## 2022-01-22 ENCOUNTER — APPOINTMENT (OUTPATIENT)
Dept: GENERAL RADIOLOGY | Age: 72
DRG: 292 | End: 2022-01-22
Payer: MEDICARE

## 2022-01-22 ENCOUNTER — APPOINTMENT (OUTPATIENT)
Dept: CT IMAGING | Age: 72
DRG: 292 | End: 2022-01-22
Payer: MEDICARE

## 2022-01-22 ENCOUNTER — HOSPITAL ENCOUNTER (INPATIENT)
Age: 72
LOS: 11 days | Discharge: HOME HEALTH | DRG: 292 | End: 2022-02-02
Attending: EMERGENCY MEDICINE | Admitting: INTERNAL MEDICINE
Payer: MEDICARE

## 2022-01-22 DIAGNOSIS — R60.0 LEG EDEMA: Primary | ICD-10-CM

## 2022-01-22 DIAGNOSIS — E87.79 OTHER HYPERVOLEMIA: ICD-10-CM

## 2022-01-22 DIAGNOSIS — I50.9 ACUTE ON CHRONIC CONGESTIVE HEART FAILURE, UNSPECIFIED HEART FAILURE TYPE (HCC): ICD-10-CM

## 2022-01-22 DIAGNOSIS — I50.23 ACUTE ON CHRONIC HFREF (HEART FAILURE WITH REDUCED EJECTION FRACTION) (HCC): ICD-10-CM

## 2022-01-22 DIAGNOSIS — N50.89 SCROTAL EDEMA: ICD-10-CM

## 2022-01-22 PROBLEM — I16.0 HYPERTENSIVE URGENCY: Status: ACTIVE | Noted: 2022-01-22

## 2022-01-22 LAB
ALBUMIN SERPL-MCNC: 3.1 GM/DL (ref 3.4–5)
ALP BLD-CCNC: 135 IU/L (ref 40–128)
ALT SERPL-CCNC: 22 U/L (ref 10–40)
ANION GAP SERPL CALCULATED.3IONS-SCNC: 9 MMOL/L (ref 4–16)
AST SERPL-CCNC: 37 IU/L (ref 15–37)
BASOPHILS ABSOLUTE: 0 K/CU MM
BASOPHILS RELATIVE PERCENT: 0.4 % (ref 0–1)
BILIRUB SERPL-MCNC: 0.9 MG/DL (ref 0–1)
BUN BLDV-MCNC: 37 MG/DL (ref 6–23)
CALCIUM SERPL-MCNC: 8 MG/DL (ref 8.3–10.6)
CHLORIDE BLD-SCNC: 101 MMOL/L (ref 99–110)
CO2: 23 MMOL/L (ref 21–32)
CREAT SERPL-MCNC: 2.2 MG/DL (ref 0.9–1.3)
DIFFERENTIAL TYPE: ABNORMAL
EOSINOPHILS ABSOLUTE: 0.1 K/CU MM
EOSINOPHILS RELATIVE PERCENT: 1.2 % (ref 0–3)
GFR AFRICAN AMERICAN: 36 ML/MIN/1.73M2
GFR NON-AFRICAN AMERICAN: 30 ML/MIN/1.73M2
GLUCOSE BLD-MCNC: 174 MG/DL (ref 70–99)
GLUCOSE BLD-MCNC: 207 MG/DL (ref 70–99)
GLUCOSE BLD-MCNC: 208 MG/DL (ref 70–99)
GLUCOSE BLD-MCNC: 241 MG/DL (ref 70–99)
GLUCOSE BLD-MCNC: 248 MG/DL (ref 70–99)
GLUCOSE BLD-MCNC: 66 MG/DL (ref 70–99)
HCT VFR BLD CALC: 32.1 % (ref 42–52)
HEMOGLOBIN: 9.5 GM/DL (ref 13.5–18)
IMMATURE NEUTROPHIL %: 0.1 % (ref 0–0.43)
LYMPHOCYTES ABSOLUTE: 0.9 K/CU MM
LYMPHOCYTES RELATIVE PERCENT: 11.5 % (ref 24–44)
MCH RBC QN AUTO: 25.3 PG (ref 27–31)
MCHC RBC AUTO-ENTMCNC: 29.6 % (ref 32–36)
MCV RBC AUTO: 85.4 FL (ref 78–100)
MONOCYTES ABSOLUTE: 0.6 K/CU MM
MONOCYTES RELATIVE PERCENT: 7.6 % (ref 0–4)
NUCLEATED RBC %: 0 %
PDW BLD-RTO: 17.9 % (ref 11.7–14.9)
PLATELET # BLD: 256 K/CU MM (ref 140–440)
PMV BLD AUTO: 9.6 FL (ref 7.5–11.1)
POTASSIUM SERPL-SCNC: 5.1 MMOL/L (ref 3.5–5.1)
PRO-BNP: 7699 PG/ML
RBC # BLD: 3.76 M/CU MM (ref 4.6–6.2)
SEGMENTED NEUTROPHILS ABSOLUTE COUNT: 6.1 K/CU MM
SEGMENTED NEUTROPHILS RELATIVE PERCENT: 79.2 % (ref 36–66)
SODIUM BLD-SCNC: 133 MMOL/L (ref 135–145)
TOTAL IMMATURE NEUTOROPHIL: 0.01 K/CU MM
TOTAL NUCLEATED RBC: 0 K/CU MM
TOTAL PROTEIN: 6.6 GM/DL (ref 6.4–8.2)
TROPONIN T: 0.03 NG/ML
TROPONIN T: 0.03 NG/ML
WBC # BLD: 7.7 K/CU MM (ref 4–10.5)

## 2022-01-22 PROCEDURE — 83880 ASSAY OF NATRIURETIC PEPTIDE: CPT

## 2022-01-22 PROCEDURE — 99285 EMERGENCY DEPT VISIT HI MDM: CPT

## 2022-01-22 PROCEDURE — 6360000002 HC RX W HCPCS: Performed by: EMERGENCY MEDICINE

## 2022-01-22 PROCEDURE — 74176 CT ABD & PELVIS W/O CONTRAST: CPT

## 2022-01-22 PROCEDURE — 2580000003 HC RX 258: Performed by: PHYSICIAN ASSISTANT

## 2022-01-22 PROCEDURE — 85025 COMPLETE CBC W/AUTO DIFF WBC: CPT

## 2022-01-22 PROCEDURE — 6370000000 HC RX 637 (ALT 250 FOR IP): Performed by: PHYSICIAN ASSISTANT

## 2022-01-22 PROCEDURE — 71045 X-RAY EXAM CHEST 1 VIEW: CPT

## 2022-01-22 PROCEDURE — 1200000000 HC SEMI PRIVATE

## 2022-01-22 PROCEDURE — 82962 GLUCOSE BLOOD TEST: CPT

## 2022-01-22 PROCEDURE — 6360000002 HC RX W HCPCS: Performed by: PHYSICIAN ASSISTANT

## 2022-01-22 PROCEDURE — 84484 ASSAY OF TROPONIN QUANT: CPT

## 2022-01-22 PROCEDURE — 93005 ELECTROCARDIOGRAM TRACING: CPT | Performed by: INTERNAL MEDICINE

## 2022-01-22 PROCEDURE — 80053 COMPREHEN METABOLIC PANEL: CPT

## 2022-01-22 RX ORDER — SODIUM CHLORIDE 9 MG/ML
25 INJECTION, SOLUTION INTRAVENOUS PRN
Status: DISCONTINUED | OUTPATIENT
Start: 2022-01-22 | End: 2022-02-02 | Stop reason: HOSPADM

## 2022-01-22 RX ORDER — HYDRALAZINE HYDROCHLORIDE 20 MG/ML
10 INJECTION INTRAMUSCULAR; INTRAVENOUS EVERY 6 HOURS PRN
Status: DISCONTINUED | OUTPATIENT
Start: 2022-01-22 | End: 2022-01-23

## 2022-01-22 RX ORDER — CARVEDILOL 6.25 MG/1
3.12 TABLET ORAL 2 TIMES DAILY
Status: DISCONTINUED | OUTPATIENT
Start: 2022-01-22 | End: 2022-02-02 | Stop reason: HOSPADM

## 2022-01-22 RX ORDER — ACETAMINOPHEN 325 MG/1
650 TABLET ORAL EVERY 6 HOURS PRN
Status: DISCONTINUED | OUTPATIENT
Start: 2022-01-22 | End: 2022-02-02 | Stop reason: HOSPADM

## 2022-01-22 RX ORDER — ALPRAZOLAM 0.25 MG/1
0.25 TABLET ORAL NIGHTLY PRN
Status: DISCONTINUED | OUTPATIENT
Start: 2022-01-22 | End: 2022-02-02 | Stop reason: HOSPADM

## 2022-01-22 RX ORDER — LANOLIN ALCOHOL/MO/W.PET/CERES
3 CREAM (GRAM) TOPICAL NIGHTLY PRN
Status: DISCONTINUED | OUTPATIENT
Start: 2022-01-22 | End: 2022-02-02 | Stop reason: HOSPADM

## 2022-01-22 RX ORDER — ACETAMINOPHEN 650 MG/1
650 SUPPOSITORY RECTAL EVERY 6 HOURS PRN
Status: DISCONTINUED | OUTPATIENT
Start: 2022-01-22 | End: 2022-02-02 | Stop reason: HOSPADM

## 2022-01-22 RX ORDER — METAXALONE 800 MG/1
800 TABLET ORAL 3 TIMES DAILY
Status: DISCONTINUED | OUTPATIENT
Start: 2022-01-22 | End: 2022-01-26

## 2022-01-22 RX ORDER — FUROSEMIDE 10 MG/ML
40 INJECTION INTRAMUSCULAR; INTRAVENOUS 2 TIMES DAILY
Status: DISCONTINUED | OUTPATIENT
Start: 2022-01-22 | End: 2022-01-23

## 2022-01-22 RX ORDER — OXYCODONE HYDROCHLORIDE 5 MG/1
5 TABLET ORAL EVERY 4 HOURS PRN
Status: DISCONTINUED | OUTPATIENT
Start: 2022-01-22 | End: 2022-01-25

## 2022-01-22 RX ORDER — SODIUM CHLORIDE 0.9 % (FLUSH) 0.9 %
5-40 SYRINGE (ML) INJECTION EVERY 12 HOURS SCHEDULED
Status: DISCONTINUED | OUTPATIENT
Start: 2022-01-22 | End: 2022-02-02 | Stop reason: HOSPADM

## 2022-01-22 RX ORDER — ONDANSETRON 2 MG/ML
4 INJECTION INTRAMUSCULAR; INTRAVENOUS EVERY 6 HOURS PRN
Status: DISCONTINUED | OUTPATIENT
Start: 2022-01-22 | End: 2022-02-02 | Stop reason: HOSPADM

## 2022-01-22 RX ORDER — MAGNESIUM HYDROXIDE/ALUMINUM HYDROXICE/SIMETHICONE 120; 1200; 1200 MG/30ML; MG/30ML; MG/30ML
30 SUSPENSION ORAL EVERY 6 HOURS PRN
Status: DISCONTINUED | OUTPATIENT
Start: 2022-01-22 | End: 2022-02-02 | Stop reason: HOSPADM

## 2022-01-22 RX ORDER — NICOTINE POLACRILEX 4 MG
15 LOZENGE BUCCAL PRN
Status: DISCONTINUED | OUTPATIENT
Start: 2022-01-22 | End: 2022-01-29

## 2022-01-22 RX ORDER — CLOPIDOGREL BISULFATE 75 MG/1
75 TABLET ORAL DAILY
Status: DISCONTINUED | OUTPATIENT
Start: 2022-01-22 | End: 2022-02-02 | Stop reason: HOSPADM

## 2022-01-22 RX ORDER — DOXAZOSIN MESYLATE 4 MG/1
4 TABLET ORAL 2 TIMES DAILY
Status: DISCONTINUED | OUTPATIENT
Start: 2022-01-22 | End: 2022-01-23

## 2022-01-22 RX ORDER — SODIUM CHLORIDE 0.9 % (FLUSH) 0.9 %
5-40 SYRINGE (ML) INJECTION PRN
Status: DISCONTINUED | OUTPATIENT
Start: 2022-01-22 | End: 2022-02-02 | Stop reason: HOSPADM

## 2022-01-22 RX ORDER — MORPHINE SULFATE 2 MG/ML
2 INJECTION, SOLUTION INTRAMUSCULAR; INTRAVENOUS EVERY 4 HOURS PRN
Status: DISCONTINUED | OUTPATIENT
Start: 2022-01-22 | End: 2022-02-02 | Stop reason: HOSPADM

## 2022-01-22 RX ORDER — POLYETHYLENE GLYCOL 3350 17 G/17G
17 POWDER, FOR SOLUTION ORAL DAILY PRN
Status: DISCONTINUED | OUTPATIENT
Start: 2022-01-22 | End: 2022-02-02 | Stop reason: HOSPADM

## 2022-01-22 RX ORDER — LISINOPRIL 5 MG/1
5 TABLET ORAL DAILY
Status: DISCONTINUED | OUTPATIENT
Start: 2022-01-22 | End: 2022-02-02 | Stop reason: HOSPADM

## 2022-01-22 RX ORDER — DEXTROSE MONOHYDRATE 25 G/50ML
12.5 INJECTION, SOLUTION INTRAVENOUS PRN
Status: DISCONTINUED | OUTPATIENT
Start: 2022-01-22 | End: 2022-01-22 | Stop reason: CLARIF

## 2022-01-22 RX ORDER — HEPARIN SODIUM 5000 [USP'U]/ML
5000 INJECTION, SOLUTION INTRAVENOUS; SUBCUTANEOUS EVERY 8 HOURS SCHEDULED
Status: DISCONTINUED | OUTPATIENT
Start: 2022-01-22 | End: 2022-02-02 | Stop reason: HOSPADM

## 2022-01-22 RX ORDER — ALBUTEROL SULFATE 90 UG/1
2 AEROSOL, METERED RESPIRATORY (INHALATION) EVERY 4 HOURS PRN
Status: DISCONTINUED | OUTPATIENT
Start: 2022-01-22 | End: 2022-02-02 | Stop reason: HOSPADM

## 2022-01-22 RX ORDER — POLYETHYLENE GLYCOL 3350 17 G/17G
17 POWDER, FOR SOLUTION ORAL DAILY PRN
Status: DISCONTINUED | OUTPATIENT
Start: 2022-01-22 | End: 2022-01-22 | Stop reason: SDUPTHER

## 2022-01-22 RX ORDER — DEXTROSE MONOHYDRATE 50 MG/ML
100 INJECTION, SOLUTION INTRAVENOUS PRN
Status: DISCONTINUED | OUTPATIENT
Start: 2022-01-22 | End: 2022-02-02 | Stop reason: HOSPADM

## 2022-01-22 RX ORDER — SPIRONOLACTONE 25 MG/1
25 TABLET ORAL DAILY
Status: DISCONTINUED | OUTPATIENT
Start: 2022-01-22 | End: 2022-01-26

## 2022-01-22 RX ORDER — FUROSEMIDE 10 MG/ML
40 INJECTION INTRAMUSCULAR; INTRAVENOUS ONCE
Status: COMPLETED | OUTPATIENT
Start: 2022-01-22 | End: 2022-01-22

## 2022-01-22 RX ORDER — BENZONATATE 100 MG/1
100 CAPSULE ORAL 3 TIMES DAILY PRN
Status: DISCONTINUED | OUTPATIENT
Start: 2022-01-22 | End: 2022-02-02 | Stop reason: HOSPADM

## 2022-01-22 RX ORDER — ATORVASTATIN CALCIUM 40 MG/1
40 TABLET, FILM COATED ORAL NIGHTLY
Status: DISCONTINUED | OUTPATIENT
Start: 2022-01-22 | End: 2022-02-02 | Stop reason: HOSPADM

## 2022-01-22 RX ORDER — GABAPENTIN 300 MG/1
300 CAPSULE ORAL 3 TIMES DAILY
Status: DISCONTINUED | OUTPATIENT
Start: 2022-01-22 | End: 2022-01-23

## 2022-01-22 RX ORDER — ONDANSETRON 4 MG/1
4 TABLET, ORALLY DISINTEGRATING ORAL EVERY 8 HOURS PRN
Status: DISCONTINUED | OUTPATIENT
Start: 2022-01-22 | End: 2022-02-02 | Stop reason: HOSPADM

## 2022-01-22 RX ADMIN — HEPARIN SODIUM 5000 UNITS: 5000 INJECTION INTRAVENOUS; SUBCUTANEOUS at 21:51

## 2022-01-22 RX ADMIN — CARVEDILOL 3.12 MG: 6.25 TABLET, FILM COATED ORAL at 09:27

## 2022-01-22 RX ADMIN — ATORVASTATIN CALCIUM 40 MG: 40 TABLET, FILM COATED ORAL at 20:27

## 2022-01-22 RX ADMIN — GABAPENTIN 300 MG: 300 CAPSULE ORAL at 20:27

## 2022-01-22 RX ADMIN — DOXAZOSIN 4 MG: 4 TABLET ORAL at 09:27

## 2022-01-22 RX ADMIN — MORPHINE SULFATE 2 MG: 2 INJECTION, SOLUTION INTRAMUSCULAR; INTRAVENOUS at 23:36

## 2022-01-22 RX ADMIN — CLOPIDOGREL 75 MG: 75 TABLET, FILM COATED ORAL at 09:28

## 2022-01-22 RX ADMIN — SODIUM CHLORIDE, PRESERVATIVE FREE 10 ML: 5 INJECTION INTRAVENOUS at 09:29

## 2022-01-22 RX ADMIN — GABAPENTIN 300 MG: 300 CAPSULE ORAL at 09:27

## 2022-01-22 RX ADMIN — OXYCODONE HYDROCHLORIDE 5 MG: 5 TABLET ORAL at 08:09

## 2022-01-22 RX ADMIN — GABAPENTIN 300 MG: 300 CAPSULE ORAL at 17:17

## 2022-01-22 RX ADMIN — FUROSEMIDE 40 MG: 10 INJECTION, SOLUTION INTRAMUSCULAR; INTRAVENOUS at 09:28

## 2022-01-22 RX ADMIN — SODIUM CHLORIDE, PRESERVATIVE FREE 10 ML: 5 INJECTION INTRAVENOUS at 20:27

## 2022-01-22 RX ADMIN — HEPARIN SODIUM 5000 UNITS: 5000 INJECTION INTRAVENOUS; SUBCUTANEOUS at 09:28

## 2022-01-22 RX ADMIN — LISINOPRIL 5 MG: 5 TABLET ORAL at 09:28

## 2022-01-22 RX ADMIN — DOXAZOSIN 4 MG: 4 TABLET ORAL at 20:27

## 2022-01-22 RX ADMIN — FUROSEMIDE 40 MG: 10 INJECTION, SOLUTION INTRAVENOUS at 04:48

## 2022-01-22 RX ADMIN — FUROSEMIDE 40 MG: 10 INJECTION, SOLUTION INTRAMUSCULAR; INTRAVENOUS at 17:17

## 2022-01-22 RX ADMIN — SPIRONOLACTONE 25 MG: 25 TABLET ORAL at 09:28

## 2022-01-22 ASSESSMENT — PAIN DESCRIPTION - PAIN TYPE: TYPE: ACUTE PAIN

## 2022-01-22 ASSESSMENT — PAIN SCALES - GENERAL
PAINLEVEL_OUTOF10: 9
PAINLEVEL_OUTOF10: 4
PAINLEVEL_OUTOF10: 10
PAINLEVEL_OUTOF10: 9

## 2022-01-22 ASSESSMENT — PAIN DESCRIPTION - LOCATION
LOCATION: GROIN
LOCATION: LEG;FOOT
LOCATION: LEG;FOOT

## 2022-01-22 ASSESSMENT — ENCOUNTER SYMPTOMS
BACK PAIN: 0
COUGH: 0
VOMITING: 0
SORE THROAT: 0
SHORTNESS OF BREATH: 1
EYE PAIN: 0
RHINORRHEA: 0
EYE DISCHARGE: 0
ABDOMINAL PAIN: 0
NAUSEA: 0

## 2022-01-22 ASSESSMENT — PAIN DESCRIPTION - PROGRESSION: CLINICAL_PROGRESSION: GRADUALLY IMPROVING

## 2022-01-22 ASSESSMENT — PAIN DESCRIPTION - ORIENTATION: ORIENTATION: RIGHT;LEFT

## 2022-01-22 ASSESSMENT — PAIN DESCRIPTION - ONSET: ONSET: ON-GOING

## 2022-01-22 ASSESSMENT — PAIN DESCRIPTION - DESCRIPTORS
DESCRIPTORS: ACHING
DESCRIPTORS: ACHING;BURNING

## 2022-01-22 ASSESSMENT — PAIN DESCRIPTION - FREQUENCY: FREQUENCY: INTERMITTENT

## 2022-01-22 NOTE — ED TRIAGE NOTES
Pt arrived via medic with c/o bilateral leg swelling, pt also c/o scrotum swelling, wounds on feet bilteral, pt is type 2 DM

## 2022-01-22 NOTE — H&P
History and Physical      Name:  Scout Will /Age/Sex: 1950  (70 y.o. male)   MRN & CSN:  [id number] & [id number] Admission Date/Time: 2022  3:40 AM   Location:  ED26/ED-26 PCP: Hafsa Saini Day: 1    Assessment and Plan:   Scout Will is a 70 y.o.  male  who presents with Acute on chronic HFrEF (heart failure with reduced ejection fraction) ([facility]    #Acute on chronic heart failure with reduced EF   -Patient reported having increased welling over the last few days   -Noted for shortness of breath mainly with orthopnea   -BNP noted be elevated in the ED   -Patient started on Lasix therapy and will continue that IV push twice daily   -No recent echocardiogram but the last one was noted at 45%   -Patient does have an AICD placed, will obtain an echocardiogram    #Hypertensive urgency   -Patient noted with a blood pressure of 150 systolically upon arrival   -He admits to being compliant with medication at home   -will resume home medication with hydralazine IV coverage as needed    #Diabetes mellitus type 2   -Unknown last A1c   -Patient denies of long-acting insulin at home but is on sliding scale   -Continue with sliding scale while inpatient     #Scrotal edema   -Patient is follow-up with urology as outpatient   -No sign of infection at this time   -May be worsening due to heart failure    Diet No diet orders on file   DVT Prophylaxis [x] Lovenox, []  Heparin, [] SCDs, [] Ambulation   GI Prophylaxis [x] PPI,  [] H2 Blocker,  [] Carafate,  [] Diet/Tube Feeds   Code Status Prior   Disposition Patient requires continued admission due to Acute on chronic HFrEF (heart failure with reduced ejection fraction) ([facility]   MDM [] Low, [] Moderate,[x]  High  Patient's risk as above due to Acute on chronic HFrEF (heart failure with reduced ejection fraction) ([facility]     History of Present Illness:     Chief Complaint: Acute on chronic HFrEF (heart failure with reduced ejection fraction) [facility] 70 [facility]  [facility]  who presents with diabetes mellitus type 2, heart failure with reduced EF, hypertension, hyperlipidemia, sick sinus syndrome and diabetic neuropathy presented to the emergency department with complaints of increasing lower extremity edema associated with orthopnea. Patient reported that this is happened a few days ago. He was recently in the ED but was not admitted. There was reportedly the patient has stopped his Lasix therapy at home but he admits to being compliant with the other medication. He denies of any recent echocardiogram. He denies for fever, chills, chest pain, headaches, dizziness, nausea, vomiting, dysuria nor diarrhea. He reported that his scrotal edema has been present for couple weeks and he is seen the urology as outpatient. While in ED, patient had lab work done noted for significant elevation of BNP. Blood pressure was noted to be 471 systolic when arrived ED. Creatinine is slightly bumped from baseline of 1.7. The patient had cxr that noted for cardiomegaly without changes. Patient was given lasix in the ED and we were asked to admit to continue with heart failure management. Ten point ROS reviewed negative, unless as noted above    Objective:   No intake or output data in the 24 hours ending 01/22/22 0746   Vitals:   Vitals:    01/22/22 0603   BP: (!) 149/73   Pulse: 72   Resp: 19   Temp:    SpO2:      Physical Exam:   GEN Awake male, sitting upright in bed in no apparent distress. But uncomfortable due to leg/foot pain  EYES Pupils are equally round. No scleral erythema, discharge, or conjunctivitis. HENT Mucous membranes are moist. Oral pharynx without exudates, no evidence of thrush. NECK Supple, no apparent thyromegaly or masses. RESP breath sounds diminished. Symmetric chest movement. CARDIO/VASC   S1/S2 auscultated. Regular rate without appreciable murmurs, rubs, or gallops. No JVD or carotid bruits.  Peripheral pulses equal bilaterally and palpable. Excessive lower extremity edema. GI Abdomen is soft without significant tenderness, masses, or guarding. Bowel sounds are normoactive. : scrotal edema is noted with tenderness, parkinson is placed  MSK No gross joint deformities. SKIN Normal coloration, warm, dry. NEURO Cranial nerves appear grossly intact, normal speech, no lateralizing weakness. No focal deficit  PSYCH Awake, alert, oriented x 4. Affect appropriate. Past Medical History:      Past Medical History:   Diagnosis Date    Acid reflux     Acute MI ([address] 2004, 2008    Arthritis     Back    Broken teeth     Upper Front    CAD (coronary artery disease)     Sees Dr. Mariano Womack [facility])     per old chart    CHF (congestive heart failure) ([address]     Chronic back pain     Chronic kidney disease     STAGE 3 KIDNEY FAILURE- \"from my diabetes- do not follow with any one- have seen Dr Rodri Castrejon in the past\"    Diabetes mellitus ([facility] Dx 1965    per old chart pt has been diabetic since age 13    Diabetic neuropathy ([address]     \"in my feet\"    H/O cardiovascular stress test 08/25/2016    H/O Doppler ultrasound 09/27/2018    Moderate disease of the right lower extremity with an JALEN of 0.72.   Moderate to severe disease of the left lower extremity with an JALEN of 0.55.   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    Neuropathy     both feet    neuropathy     PAD (peripheral artery disease) ([address] 09/27/2018    PVD (peripheral vascular disease) ([address]     Sick sinus syndrome ([address]     Sleep apnea     \"sleep study 3 yrs ago- could not tolerate the cpap made me too dry\"    Spinal stenosis     Teeth missing     Upper And Lower    [facility]     WD-Chronic foot ulcer, left, with necrosis of bone ([facility] 2021     PSHX:  has a past surgical history that includes Coronary angioplasty with stent; Dental surgery; Colonoscopy (2016); pacemaker placement (2010); vascular surgery; colectomy (Right, 2016); Toe amputation (Right, 2017); Toe amputation (Right, 2018); Cardiac catheterization; Cardiac defibrillator placement (2010); Coronary angioplasty; Cardiac catheterization (2017); Cardiac catheterization (2018); Toe amputation (Left, 2020); and IR TUNNELED CVC PLACE WO SQ PORT/PUMP > 5 YEARS (2021). Allergies: Allergies   Allergen Reactions    Pcn [Penicillins] Hives    Fentanyl Itching       FAM HX: family history includes Cancer in his brother, father, and son; Diabetes in his brother, mother, and sister; Early Death (age of onset: 62) in his sister; Heart Disease in his brother and sister; High Blood Pressure in his brother, brother, and mother; Neuropathy in his sister; Other in his sister; Stroke in his mother; Vision Loss in his mother. Soc HX:   Social History     Socioeconomic History    Marital status:       Spouse name: None    Number of children: None    Years of education: None    Highest education level: None   Occupational History    None   Tobacco Use    Smoking status: Former Smoker     Packs/day: 0.25     Years: 36.00     Pack years: 9.00     Types: Cigars     Start date: 1980     Quit date: 10/22/2021     Years since quittin.2    Smokeless tobacco: Never Used    Tobacco comment: Client states he has stopped smoking   Vaping Use    Vaping Use: Never used   Substance and Sexual Activity    Alcohol use: No    Drug use: Yes     Types: Marijuana [employer])    Sexual activity: Never   Other Topics Concern    None   Social History Narrative    None     Social Determinants of Health     Financial Resource Strain:     Difficulty of Paying Living Expenses: Not on file   Food Insecurity:     Worried About Running Out of Food in the Last Year: Not on file    Vidya of Food in the Last Year: Not on file   Transportation Needs:     Lack of Transportation (Medical): Not on file    Lack of Transportation (Non-Medical):  Not on file   Physical Activity:     Days of Exercise per Week: Not on file    Minutes of Exercise per Session: Not on file   Stress:     Feeling of Stress : Not on file   Social Connections:     Frequency of Communication with Friends and Family: Not on file    Frequency of Social Gatherings with Friends and Family: Not on file    Attends Denominational Services: Not on file    Active Member of [address] [place] [address] or Organizations: Not on file    Attends Club or Organization Meetings: Not on file    Marital Status: Not on file   Intimate Partner Violence:     Fear of Current or Ex-Partner: Not on file    Emotionally Abused: Not on file    Physically Abused: Not on file    Sexually Abused: Not on file   Housing Stability:     Unable to Pay for Housing in the Last Year: Not on file    Number of [place] in the Last Year: Not on file    Unstable Housing in the Last Year: Not on file       Medications:   Medications:    Infusions:   PRN Meds:       Electronically signed by Génesis Danielle PA-C on 1/22/2022 at 7:46 AM

## 2022-01-22 NOTE — ED NOTES
Wound to great toes and feet are still present bilaterally. Both great toes are weeping puss wit foul odor. Pt states wound care RN change bandages Thursday 20th, 2 days ago.       Dixie Jimenez RN  01/22/22 9291

## 2022-01-22 NOTE — PROGRESS NOTES
Attestation note. Patient was seen and examined in the emergency room. Labs and imaging reviewed and case was discussed with the WAGNER. In brief patient is a 70-year-old male that presented with increased lower extremity swelling as well as scrotal swelling. This been going on for the past few days and also endorses episodes of orthopnea. Denies any fevers chills nausea vomiting and denies any chest pains. On exam patient was resting comfortably on room air in no acute distress, Fuller in place with notable scrotal edema, does have bilateral lower extremity pitting edema, anterior breath sounds were clear to auscultation, and heart rate was regular rate and rhythm; does have noted amputations of the digits of his lower extremities that did not appear infected. Labs in the ED were notable for sodium of 133 a creatinine of 2. 2(with baseline of 1.6-2.0 recently), troponin of 0.031. Chest x-ray with no notable edema; last echo from 6/11/2021 reviewed with severe tricuspid regurgitation with an RVSP of 68mmHg and severely diuretic ventricle; EF was slightly decreased to 45%. We will continue IV diuresis and monitor closely for right sided heart failure; will monitor daily BMP with diruresis; suspecting cardiorenal.  We will trend troponin given elevation in the emergency room; no EKG was completed in the emergency room so we will order. We will continue all home medications and sliding scale insulin for his underlying diabetes and monitor closely. I was available for questions and consultation with the WAGNER as needed.

## 2022-01-22 NOTE — ED PROVIDER NOTES
Cardiovascular: Negative for chest pain and palpitations. Gastrointestinal: Negative for abdominal pain, nausea and vomiting. Endocrine: Negative for polydipsia and polyuria. Genitourinary: Negative for dysuria and flank pain. Musculoskeletal: Negative for back pain and neck pain. Skin: Positive for wound. Negative for pallor. Chronic wounds b/l feet and lower legs   Neurological: Negative for dizziness, facial asymmetry, light-headedness and headaches. Psychiatric/Behavioral: Negative for confusion. Except as noted above the remainder of the review of systems was reviewed and negative. PAST MEDICAL HISTORY     Past Medical History:   Diagnosis Date    Acid reflux     Acute MI ([facility] 2004, 2008    Arthritis     Back    Broken teeth     Upper Front    CAD (coronary artery disease)     Sees Dr. Roxana Lackey [facility])     per old chart    CHF (congestive heart failure) ([facility]     Chronic back pain     Chronic kidney disease     STAGE 3 KIDNEY FAILURE- \"from my diabetes- do not follow with any one- have seen Dr Xochitl Bowen in the past\"    Diabetes mellitus ([facility] Dx 1965    per old chart pt has been diabetic since age 13    Diabetic neuropathy ([facility]     \"in my feet\"    H/O cardiovascular stress test 08/25/2016    H/O Doppler ultrasound 09/27/2018    Moderate disease of the right lower extremity with an JALEN of 0.72.   Moderate to severe disease of the left lower extremity with an JALEN of 0.55.   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    Neuropathy     both feet    neuropathy     PAD (peripheral artery disease) ([facility] 09/27/2018    PVD (peripheral vascular disease) ([facility]     Sick sinus syndrome (HCC)     Sleep apnea     \"sleep study 3 yrs ago- could not tolerate the cpap made me too dry\"    Spinal stenosis     Teeth missing     Upper And Lower    Type 2 diabetes mellitus without complication ([facility]     WD-Chronic foot ulcer, left, with necrosis of bone ([facility] 11/12/2021       Prior to Admission medications    Medication Sig Start Date End Date Taking? Authorizing Provider   diclofenac sodium (VOLTAREN) 1 % GEL Apply 4 g topically 2 times daily 12/2/21   Ignacio Shone, MD   gabapentin (NEURONTIN) 300 MG capsule TAKE 1 CAPSULE BY MOUTH 3 TIMES DAILY FOR 90 DAYS.  11/15/21 2/13/22  Toño Ramirez PA-C   amLODIPine (NORVASC) 10 MG tablet Take 1 tablet by mouth daily 10/18/21   Ignacio Shone, MD   doxazosin (CARDURA) 4 MG tablet Take 1 tablet by mouth 2 times daily 10/18/21   Ignacio Shone, MD   metaxalone (SKELAXIN) 800 MG tablet Take 800 mg by mouth 3 times daily    Historical Provider, MD Davey Larve 18 MCG inhalation capsule  6/18/21   Historical Provider, MD   insulin lispro, 1 Unit Dial, (HUMALOG KWIKPEN) 100 UNIT/ML SOPN Inject into the skin 2 times daily Sliding scale dose    Historical Provider, MD   chlorthalidone (HYGROTEN) 50 MG tablet Take 1 tablet by mouth daily 6/15/21   Keely Rizvi MD   atorvastatin (LIPITOR) 40 MG tablet Take 1 tablet by mouth nightly 12/13/20   Timmy Curiel MD   carvedilol (COREG) 3.125 MG tablet Take 1 tablet by mouth 2 times daily 12/13/20   Timmy Curiel MD   albuterol sulfate HFA (VENTOLIN HFA) 108 (90 Base) MCG/ACT inhaler Inhale 2 puffs into the lungs every 4 hours as needed for Wheezing 8/14/20   Tereza Mccray MD   Calcium Alginate (ALGICELL CALCIUM DRESSING 4\"X8) MISC Apply 1 Device topically daily 3/20/20   Elgin Espinal MD   [facility] COREY WOUND DRESSING MATRIX Apply topically Apply to left daily and cover with gauze 3/19/20   Rossy Valencia MD   clopidogrel (PLAVIX) 75 MG tablet Take 1 tablet by mouth daily 7/18/17   Geeta Davis MD        Patient Active Problem List   Diagnosis    [facility]per[facility] disease) [facility]    Chronic coronary artery disease    Biventricular ICD (implantable cardioverter-defibrillator) in place    Chronic combined systolic and diastolic heart failure (HCC)    Chronic kidney disease, stage III (moderate) ([facility])    Mixed hyperlipidemia    Sick sinus syndrome ([address]    Type 2 diabetes mellitus with diabetic polyneuropathy ([address]    Spinal stenosis of lumbar region    Obesity, Class I, BMI 30-34.9    S/P partial colectomy    Tubulovillous adenoma of colon    Microalbuminuria    WD-PVD (peripheral vascular disease) ([address]    Limb ischemia    Necrotic toes ([facility])    Toe gangrene ([address]    Diabetic foot infection ([address]    Chronic kidney disease (CKD) stage G3a/A2, moderately decreased glomerular filtration rate (GFR) between 45-59 mL/min/1.73 square meter and albuminuria creatinine ratio between  mg/g ([facility])    Edema    ICD (implantable cardioverter-defibrillator) battery depletion    Hyperkalemia    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[address]    Ischemia of toe    Acute kidney injury ([address]    Fluid overload    DM (diabetes mellitus) ([address]    Precordial pain    Acute chest pain    Unstable angina ([facility])    Chronic kidney disease (CKD) stage G3a/A3, moderately decreased glomerular filtration rate (GFR) between 45-59 mL/min/1.73 square meter and albuminuria creatinine ratio greater than 300 mg/g ([facility])    Cardiomyopathy ([address]    Diabetic neuropathy ([address]    HTN (hypertension)    Epigastric pain    Acute on chronic congestive heart failure (HCC)    Bilateral lower extremity edema    Acute on chronic systolic CHF (congestive heart failure) ([address]    Diabetic foot ulcer with osteomyelitis ([address]    Visit for wound check    Moderate malnutrition ([address]    Long term (current) use of antibiotics    WD-Diabetic ulcer of toe of right foot associated with type 2 diabetes mellitus, with fat layer exposed ([address]    WD-Diabetic ulcer of toe of left foot associated with type 2 diabetes mellitus, with fat layer exposed ([address]    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    Persistent wound pain    Receiving intravenous antibiotic treatment as outpatient    Acute on chronic respiratory failure with hypoxemia ([address]    WD-Chronic foot ulcer, left, with necrosis of bone ([address]         SURGICAL HISTORY       Past Surgical History:   Procedure Laterality Date    CARDIAC CATHETERIZATION      per old chart done 10/2014    CARDIAC CATHETERIZATION  07/14/2017    with angiography of leg    CARDIAC CATHETERIZATION  11/20/2018    PATENT STENTS OF ALL THREE MAJOR VESSELS    CARDIAC DEFIBRILLATOR PLACEMENT  06/04/2010    [employer] Seceusebio DIAZ Defibrillator Implanted    COLECTOMY Right 08/26/2016    laparascopic; robotic assisted    COLONOSCOPY  08/04/2016    CORONARY ANGIOPLASTY      \"15 Heart Stents\"    CORONARY ANGIOPLASTY WITH STENT PLACEMENT      per old chart had angio with stent to circumflex and obtuse marginal artery at [facility] 5/2010( old chart also gives hx of stent placement done 2000,2004 and 2005)    DENTAL SURGERY      Teeth Extracted In Past    IR TUNNELED CATHETER PLACEMENT GREATER THAN 5 YEARS  6/14/2021    IR TUNNELED CATHETER PLACEMENT GREATER THAN 5 YEARS 6/14/2021 [facility]ED[facility]    PACEMAKER PLACEMENT  06/04/2010    [employer]onic [address] Defibrillator Implanted    TOE AMPUTATION Right 09/12/2017    Rt 3rd toe    TOE AMPUTATION Right 01/09/2018     Right 5th toe amputation and Toenails trimmed left 2,3,4 and 5th toes    TOE AMPUTATION Left 12/26/2020    LEFT GREAT TOE AMPUTATION performed by Mare Roque MD at [address] [place] [address]      per old chart had balloon angioplasty right superfical femoral artery,right popliteal artery,,right ant.tibial artery, right tibioperoneal trunk, and right post.tibial artery wna stent placement right popliteal artery and superfical femoral artery 7/2012         CURRENT MEDICATIONS       Previous Medications    ALBUTEROL SULFATE HFA (VENTOLIN HFA) 108 (90 BASE) MCG/ACT INHALER    Inhale 2 puffs into the lungs every 4 hours as needed for Wheezing    AMLODIPINE (NORVASC) 10 MG TABLET    Take 1 tablet by mouth daily    ATORVASTATIN (LIPITOR) 40 MG TABLET    Take 1 tablet by mouth nightly    CALCIUM ALGINATE (ALGICELL CALCIUM DRESSING 4\"X8) MISC    Apply 1 Device topically daily    CARVEDILOL (COREG) 3.125 MG TABLET    Take 1 tablet by mouth 2 times daily    CHLORTHALIDONE (HYGROTEN) 50 MG TABLET    Take 1 tablet by mouth daily    CLOPIDOGREL (PLAVIX) 75 MG TABLET    Take 1 tablet by mouth daily    DICLOFENAC SODIUM (VOLTAREN) 1 % GEL    Apply 4 g topically 2 times daily    DOXAZOSIN (CARDURA) 4 MG TABLET    Take 1 tablet by mouth 2 times daily    GABAPENTIN (NEURONTIN) 300 MG CAPSULE    TAKE 1 CAPSULE BY MOUTH 3 TIMES DAILY FOR 90 DAYS.     INSULIN LISPRO, 1 UNIT DIAL, (HUMALOG KWIKPEN) 100 UNIT/ML SOPN    Inject into the skin 2 times daily Sliding scale dose    METAXALONE (SKELAXIN) 800 MG TABLET    Take 800 mg by mouth 3 times daily    PROMOGRAN COREY WOUND DRESSING MATRIX    Apply topically Apply to left daily and cover with gauze    SPIRIVA HANDIHALER 18 MCG INHALATION CAPSULE           ALLERGIES     Pcn [penicillins] and Fentanyl    FAMILY HISTORY       Family History   Problem Relation Age of Onset    Diabetes Mother     Stroke Mother     High Blood Pressure Mother     Vision Loss Mother     Cancer Father         Prostate Cancer    Diabetes Sister     Neuropathy Sister     Other Sister         \"Breathing Problems\"    Heart Disease Sister     Early Death Sister 62        Heart Complications    Cancer Brother         \"Stomach Cancer\"    High Blood Pressure Brother     Diabetes Brother     Heart Disease Brother     High Blood Pressure Brother     Cancer Son         \"Testicle Cancer\"          SOCIAL HISTORY       Social History     Socioeconomic History    Marital status:      Spouse name: None    Number of children: None    Years of education: None    Highest education level: None   Occupational History    None   Tobacco Use    Smoking status: Former Smoker     Packs/day: 0.25     Years: 36.00     Pack years: 9.00     Types: Cigars     Start date: 1980     Quit date: 10/22/2021     Years since quittin.2    Smokeless tobacco: Never Used    Tobacco comment: Client states he has stopped smoking   Vaping Use    Vaping Use: Never used   Substance and Sexual Activity    Alcohol use: No    Drug use: Yes     Types: Marijuana Deboraha Sanes)    Sexual activity: Never   Other Topics Concern    None   Social History Narrative    None     Social Determinants of Health     Financial Resource Strain:     Difficulty of Paying Living Expenses: Not on file   Food Insecurity:     Worried About Running Out of Food in the Last Year: Not on file    Vidya of Food in the Last Year: Not on file   Transportation Needs:     Lack of Transportation (Medical): Not on file    Lack of Transportation (Non-Medical):  Not on file   Physical Activity:     Days of Exercise per Week: Not on file    Minutes of Exercise per Session: Not on file   Stress:     Feeling of Stress : Not on file   Social Connections:     Frequency of Communication with Friends and Family: Not on file    Frequency of Social Gatherings with Friends and Family: Not on file    Attends Anglican Services: Not on file    Active Member of Clubs or Organizations: Not on file    Attends Club or Organization Meetings: Not on file    Marital Status: Not on file   Intimate Partner Violence:     Fear of Current or Ex-Partner: Not on file    Emotionally Abused: Not on file    Physically Abused: Not on file    Sexually Abused: Not on file   Housing Stability:     Unable to Pay for Housing in the Last Year: Not on file    Number of Places Lived in the Last Year: Not on file    Unstable Housing in the Last Year: Not on file       SCREENINGS    Jalen Coma Scale  Eye Opening: Spontaneous  Best Verbal Response: Oriented  Best Motor Response: Obeys commands  Jalen Coma Scale Score: 15          PHYSICAL EXAM    (up to 7 for level 4, 8 or more for level 5)     ED Triage Vitals [01/22/22 0343]   BP Temp Temp Source Pulse Resp SpO2 Height Weight   (!) 174/80 97.9 °F (36.6 °C) Oral 71 19 96 % 6' (1.829 m) 270 lb (122.5 kg)       Physical Exam  Vitals reviewed. Constitutional:       Appearance: He is not ill-appearing or toxic-appearing. HENT:      Head: Normocephalic and atraumatic. Nose: No congestion or rhinorrhea. Mouth/Throat:      Pharynx: No oropharyngeal exudate or posterior oropharyngeal erythema. Eyes:      Extraocular Movements: Extraocular movements intact. Pupils: Pupils are equal, round, and reactive to light. Cardiovascular:      Rate and Rhythm: Normal rate. Heart sounds: No friction rub. No gallop. Pulmonary:      Effort: No respiratory distress. Comments: B/l breath sounds  No retractions  Diminished breath sounds bilaterally  Chest:      Chest wall: No tenderness. Abdominal:      Palpations: Abdomen is soft. Tenderness: There is no abdominal tenderness. There is no guarding. Comments: Abdomen soft, non-tender, no guarding on abdominal palpation   Genitourinary:     Comments: Scrotal swelling; fluid leakage from scrotum; no erythema or crepitance  Musculoskeletal:         General: Swelling present. Cervical back: Normal range of motion and neck supple. No tenderness. Right lower leg: Edema present. Left lower leg: Edema present. Comments: Prior toe amputations; chronic wounds/ulcers b/l feet  Significant b/l edema   Lymphadenopathy:      Cervical: No cervical adenopathy. Skin:     General: Skin is warm.       Capillary (*)     CREATININE 2.2 (*)     Glucose 248 (*)     Albumin 3.1 (*)     Alkaline Phosphatase 135 (*)     GFR Non- 30 (*)     GFR  36 (*)     All other components within normal limits   CBC WITH AUTO DIFFERENTIAL - Abnormal; Notable for the following components:    RBC 3.76 (*)     Hemoglobin 9.5 (*)     Hematocrit 32.1 (*)     MCH 25.3 (*)     MCHC 29.6 (*)     RDW 17.9 (*)     Segs Relative 79.2 (*)     Lymphocytes % 11.5 (*)     Monocytes % 7.6 (*)     All other components within normal limits   TROPONIN - Abnormal; Notable for the following components:    Troponin T 0.031 (*)     All other components within normal limits   BRAIN NATRIURETIC PEPTIDE       All other labs were within normal range or not returned as of this dictation. EMERGENCY DEPARTMENT COURSE and DIFFERENTIAL DIAGNOSIS/MDM:   Vitals:    Vitals:    01/22/22 0343 01/22/22 0349   BP: (!) 174/80    Pulse: 71 71   Resp: 19    Temp: 97.9 °F (36.6 °C)    TempSrc: Oral    SpO2: 96%    Weight: 270 lb (122.5 kg)    Height: 6' (1.829 m)            MDM  Number of Diagnoses or Management Options  Acute on chronic congestive heart failure, unspecified heart failure type (HCC)  Leg edema  Other hypervolemia  Scrotal edema  Diagnosis management comments: 66-year-old male with a history of chronic wounds and ulcers to bilateral lower legs, lymphedema, peripheral edema, heart failure presents with worsening swelling. States he is having significant swelling in his legs and scrotum. He states he is having lots of drainage of fluid from his scrotum. He lives at home. He has been seen in the emergency department previously for similar symptoms. He was seen about 1 week ago and received a dose of IV Lasix in the emergency department. He does report that he is on a diuretic medication but from review of his medication list did not appear to be any diuretic medications that he is currently prescribed.  He presents with elevated blood pressure. Vitals otherwise unremarkable. He does have very significant peripheral edema on exam. He has had prior amputations. He does have wounds on bilateral feet do have some exudate. He also does have some seepage of fluid from his scrotum. No significant erythema or crepitance noted. Labs and imaging are obtained. Chest x-ray is also obtained. Labs overall stable. Does have a chronic elevated troponin today is 0.031 which is in line with his baseline range. Serum creatinine is 2.2 which is also within his baseline range  Is given a dose of Lasix in the emergency department. He will be admitted for diuresis and for further evaluation and management. He is agreeable to admission       Amount and/or Complexity of Data Reviewed  Decide to obtain previous medical records or to obtain history from someone other than the patient: yes        -  Patient seen and evaluated in the emergency department. -  Triage and nursing notes reviewed and incorporated. -  Old chart records reviewed and incorporated. -  Work-up included:  See above  -  Results discussed with patient. CONSULTS:  IP CONSULT TO HOSPITALIST    PROCEDURES:  None performed unless otherwise noted below     Procedures        FINAL IMPRESSION      1. Leg edema    2. Scrotal edema    3. Other hypervolemia    4. Acute on chronic congestive heart failure, unspecified heart failure type ([address]          DISPOSITION/PLAN   DISPOSITION        PATIENT REFERRED TO:  No follow-up provider specified. DISCHARGE MEDICATIONS:  New Prescriptions    No medications on file       ED Provider Disposition Time  DISPOSITION        Appropriate personal protective equipment was worn during the patient's evaluation. These included surgical, eye protection, surgical mask or in 95 respirator and gloves. The patient was also placed in a surgical mask for the prevention of possible spread of respiratory viral illnesses.     The Patient was instructed to read the package inserts with any medication that was prescribed. Major potential reactions and medication interactions were discussed. The Patient understands that there are numerous possible adverse reactions not covered. The patient was also instructed to arrange follow-up with his or her primary care provider for review of any pending labwork or incidental findings on any radiology results that were obtained. All efforts were made to discuss any incidental findings that require further monitoring. Controlled Substances Monitoring:     No flowsheet data found.     (Please note that portions of this note were completed with a voice recognition program.  Efforts were made to edit the dictations but occasionally words are mis-transcribed.)    Robyn Tang MD (electronically signed)  Attending Emergency Physician           Robyn Tang MD  01/22/22 9007

## 2022-01-22 NOTE — ED NOTES
Bed: ED-39  Expected date:   Expected time:   Means of arrival:   Comments:  LALA Brito RN  01/22/22 1211

## 2022-01-22 NOTE — ED NOTES
Bed: ED-26  Expected date:   Expected time:   Means of arrival:   Comments:  EM[facility]      [facility]  01/22/22 8742

## 2022-01-23 LAB
ANION GAP SERPL CALCULATED.3IONS-SCNC: 7 MMOL/L (ref 4–16)
BACTERIA: NEGATIVE /HPF
BILIRUBIN URINE: NEGATIVE MG/DL
BLOOD, URINE: ABNORMAL
BUN BLDV-MCNC: 35 MG/DL (ref 6–23)
CALCIUM SERPL-MCNC: 7.6 MG/DL (ref 8.3–10.6)
CHLORIDE BLD-SCNC: 103 MMOL/L (ref 99–110)
CLARITY: CLEAR
CO2: 24 MMOL/L (ref 21–32)
COLOR: YELLOW
CREAT SERPL-MCNC: 2.2 MG/DL (ref 0.9–1.3)
CREATININE URINE: 50.7 MG/DL (ref 39–259)
GFR AFRICAN AMERICAN: 36 ML/MIN/1.73M2
GFR NON-AFRICAN AMERICAN: 30 ML/MIN/1.73M2
GLUCOSE BLD-MCNC: 146 MG/DL (ref 70–99)
GLUCOSE BLD-MCNC: 193 MG/DL (ref 70–99)
GLUCOSE BLD-MCNC: 216 MG/DL (ref 70–99)
GLUCOSE BLD-MCNC: 56 MG/DL (ref 70–99)
GLUCOSE BLD-MCNC: 62 MG/DL (ref 70–99)
GLUCOSE, URINE: NEGATIVE MG/DL
KETONES, URINE: NEGATIVE MG/DL
LEUKOCYTE ESTERASE, URINE: ABNORMAL
MAGNESIUM: 1.7 MG/DL (ref 1.8–2.4)
MUCUS: ABNORMAL HPF
NITRITE URINE, QUANTITATIVE: NEGATIVE
PH, URINE: 5 (ref 5–8)
POTASSIUM SERPL-SCNC: 4.9 MMOL/L (ref 3.5–5.1)
PROT/CREAT RATIO, UR: 0.4
PROTEIN UA: NEGATIVE MG/DL
RBC URINE: 19 /HPF (ref 0–3)
SODIUM BLD-SCNC: 134 MMOL/L (ref 135–145)
SODIUM URINE: 84 MMOL/L (ref 35–167)
SPECIFIC GRAVITY UA: 1.01 (ref 1–1.03)
TROPONIN T: 0.05 NG/ML
URINE TOTAL PROTEIN: 21.3 MG/DL
UROBILINOGEN, URINE: 1 MG/DL (ref 0.2–1)
WBC CLUMP: ABNORMAL /HPF
WBC UA: 34 /HPF (ref 0–2)

## 2022-01-23 PROCEDURE — 1200000000 HC SEMI PRIVATE

## 2022-01-23 PROCEDURE — 87070 CULTURE OTHR SPECIMN AEROBIC: CPT

## 2022-01-23 PROCEDURE — 6370000000 HC RX 637 (ALT 250 FOR IP): Performed by: PHYSICIAN ASSISTANT

## 2022-01-23 PROCEDURE — 81001 URINALYSIS AUTO W/SCOPE: CPT

## 2022-01-23 PROCEDURE — 6360000002 HC RX W HCPCS: Performed by: PHYSICIAN ASSISTANT

## 2022-01-23 PROCEDURE — 2580000003 HC RX 258: Performed by: INTERNAL MEDICINE

## 2022-01-23 PROCEDURE — 99223 1ST HOSP IP/OBS HIGH 75: CPT | Performed by: INTERNAL MEDICINE

## 2022-01-23 PROCEDURE — 83735 ASSAY OF MAGNESIUM: CPT

## 2022-01-23 PROCEDURE — 84156 ASSAY OF PROTEIN URINE: CPT

## 2022-01-23 PROCEDURE — 6360000002 HC RX W HCPCS: Performed by: INTERNAL MEDICINE

## 2022-01-23 PROCEDURE — 2580000003 HC RX 258: Performed by: NURSE PRACTITIONER

## 2022-01-23 PROCEDURE — 94640 AIRWAY INHALATION TREATMENT: CPT

## 2022-01-23 PROCEDURE — 82962 GLUCOSE BLOOD TEST: CPT

## 2022-01-23 PROCEDURE — 87591 N.GONORRHOEAE DNA AMP PROB: CPT

## 2022-01-23 PROCEDURE — 6360000002 HC RX W HCPCS: Performed by: NURSE PRACTITIONER

## 2022-01-23 PROCEDURE — 80048 BASIC METABOLIC PNL TOTAL CA: CPT

## 2022-01-23 PROCEDURE — 87186 SC STD MICRODIL/AGAR DIL: CPT

## 2022-01-23 PROCEDURE — 2580000003 HC RX 258: Performed by: PHYSICIAN ASSISTANT

## 2022-01-23 PROCEDURE — 84484 ASSAY OF TROPONIN QUANT: CPT

## 2022-01-23 PROCEDURE — 94761 N-INVAS EAR/PLS OXIMETRY MLT: CPT

## 2022-01-23 PROCEDURE — 82570 ASSAY OF URINE CREATININE: CPT

## 2022-01-23 PROCEDURE — 36415 COLL VENOUS BLD VENIPUNCTURE: CPT

## 2022-01-23 PROCEDURE — 87491 CHLMYD TRACH DNA AMP PROBE: CPT

## 2022-01-23 PROCEDURE — 2700000000 HC OXYGEN THERAPY PER DAY

## 2022-01-23 PROCEDURE — 84300 ASSAY OF URINE SODIUM: CPT

## 2022-01-23 PROCEDURE — 87075 CULTR BACTERIA EXCEPT BLOOD: CPT

## 2022-01-23 PROCEDURE — 87077 CULTURE AEROBIC IDENTIFY: CPT

## 2022-01-23 RX ORDER — FUROSEMIDE 10 MG/ML
80 INJECTION INTRAMUSCULAR; INTRAVENOUS 4 TIMES DAILY
Status: DISCONTINUED | OUTPATIENT
Start: 2022-01-23 | End: 2022-01-31

## 2022-01-23 RX ORDER — MAGNESIUM SULFATE IN WATER 40 MG/ML
2000 INJECTION, SOLUTION INTRAVENOUS ONCE
Status: COMPLETED | OUTPATIENT
Start: 2022-01-23 | End: 2022-01-23

## 2022-01-23 RX ADMIN — CLOPIDOGREL 75 MG: 75 TABLET, FILM COATED ORAL at 09:16

## 2022-01-23 RX ADMIN — CHLOROTHIAZIDE SODIUM 500 MG: 500 INJECTION, POWDER, LYOPHILIZED, FOR SOLUTION INTRAVENOUS at 18:17

## 2022-01-23 RX ADMIN — FUROSEMIDE 40 MG: 10 INJECTION, SOLUTION INTRAMUSCULAR; INTRAVENOUS at 09:17

## 2022-01-23 RX ADMIN — CARVEDILOL 3.12 MG: 6.25 TABLET, FILM COATED ORAL at 09:19

## 2022-01-23 RX ADMIN — FUROSEMIDE 80 MG: 10 INJECTION, SOLUTION INTRAMUSCULAR; INTRAVENOUS at 18:05

## 2022-01-23 RX ADMIN — HEPARIN SODIUM 5000 UNITS: 5000 INJECTION INTRAVENOUS; SUBCUTANEOUS at 22:12

## 2022-01-23 RX ADMIN — SODIUM CHLORIDE, PRESERVATIVE FREE 10 ML: 5 INJECTION INTRAVENOUS at 23:31

## 2022-01-23 RX ADMIN — CARVEDILOL 3.12 MG: 6.25 TABLET, FILM COATED ORAL at 22:11

## 2022-01-23 RX ADMIN — MORPHINE SULFATE 2 MG: 2 INJECTION, SOLUTION INTRAMUSCULAR; INTRAVENOUS at 04:39

## 2022-01-23 RX ADMIN — ALPRAZOLAM 0.25 MG: 0.25 TABLET ORAL at 00:45

## 2022-01-23 RX ADMIN — OXYCODONE HYDROCHLORIDE 5 MG: 5 TABLET ORAL at 00:46

## 2022-01-23 RX ADMIN — FUROSEMIDE 80 MG: 10 INJECTION, SOLUTION INTRAMUSCULAR; INTRAVENOUS at 22:12

## 2022-01-23 RX ADMIN — HEPARIN SODIUM 5000 UNITS: 5000 INJECTION INTRAVENOUS; SUBCUTANEOUS at 04:39

## 2022-01-23 RX ADMIN — TIOTROPIUM BROMIDE INHALATION SPRAY 2 PUFF: 3.12 SPRAY, METERED RESPIRATORY (INHALATION) at 07:44

## 2022-01-23 RX ADMIN — HEPARIN SODIUM 5000 UNITS: 5000 INJECTION INTRAVENOUS; SUBCUTANEOUS at 18:05

## 2022-01-23 RX ADMIN — MORPHINE SULFATE 2 MG: 2 INJECTION, SOLUTION INTRAMUSCULAR; INTRAVENOUS at 18:11

## 2022-01-23 RX ADMIN — MORPHINE SULFATE 2 MG: 2 INJECTION, SOLUTION INTRAMUSCULAR; INTRAVENOUS at 22:10

## 2022-01-23 RX ADMIN — SODIUM CHLORIDE, PRESERVATIVE FREE 10 ML: 5 INJECTION INTRAVENOUS at 09:26

## 2022-01-23 RX ADMIN — SPIRONOLACTONE 25 MG: 25 TABLET ORAL at 09:17

## 2022-01-23 RX ADMIN — MAGNESIUM SULFATE HEPTAHYDRATE 2000 MG: 2 INJECTION, SOLUTION INTRAVENOUS at 19:07

## 2022-01-23 RX ADMIN — LISINOPRIL 5 MG: 5 TABLET ORAL at 09:17

## 2022-01-23 RX ADMIN — VANCOMYCIN HYDROCHLORIDE 2000 MG: 1 INJECTION, POWDER, LYOPHILIZED, FOR SOLUTION INTRAVENOUS at 18:34

## 2022-01-23 RX ADMIN — ATORVASTATIN CALCIUM 40 MG: 40 TABLET, FILM COATED ORAL at 22:11

## 2022-01-23 RX ADMIN — OXYCODONE HYDROCHLORIDE 5 MG: 5 TABLET ORAL at 06:09

## 2022-01-23 RX ADMIN — DOXAZOSIN 4 MG: 4 TABLET ORAL at 09:16

## 2022-01-23 RX ADMIN — ALPRAZOLAM 0.25 MG: 0.25 TABLET ORAL at 22:12

## 2022-01-23 RX ADMIN — GABAPENTIN 300 MG: 300 CAPSULE ORAL at 09:17

## 2022-01-23 ASSESSMENT — PAIN SCALES - GENERAL
PAINLEVEL_OUTOF10: 6
PAINLEVEL_OUTOF10: 6
PAINLEVEL_OUTOF10: 10
PAINLEVEL_OUTOF10: 8
PAINLEVEL_OUTOF10: 10

## 2022-01-23 ASSESSMENT — PAIN DESCRIPTION - DESCRIPTORS: DESCRIPTORS: ACHING

## 2022-01-23 ASSESSMENT — PAIN DESCRIPTION - PAIN TYPE: TYPE: ACUTE PAIN

## 2022-01-23 ASSESSMENT — PAIN DESCRIPTION - LOCATION: LOCATION: PENIS

## 2022-01-23 ASSESSMENT — PAIN DESCRIPTION - FREQUENCY: FREQUENCY: CONTINUOUS

## 2022-01-23 NOTE — ED NOTES
Assisted pt onto bedpan with staff assistance. Pt to place call light when finished.       Chalo Beckwith  01/22/22 1932

## 2022-01-23 NOTE — ED NOTES
Pt has large amount swelling noted to legs and scrotum.       [facility]  01/22/22 2037

## 2022-01-23 NOTE — ED NOTES
Pt placed call light then heard yelling in room. To room and asked pt why he is yelling pt states \"Im having pain. My penis hurts so bad and my feet too. \"       InStitchu  01/22/22 9299

## 2022-01-23 NOTE — CONSULTS
CARDIOLOGY CONSULT NOTE   Reason for consultation:  SOb/leg scrotal swelling      Referring physician:  Brandon Jimenez MD     Primary care physician: Stacy Dwyer      Dear  Dr. Brandon Jimenez MD   Thanks for the consult. Chief Complaints :  Chief Complaint   Patient presents with    Groin Swelling     scrotum very large and leaking fluid    Leg Swelling        History of present illness:Anmol is a 70 y. o.year old who presents with complaining of bilateral lower extremity swelling severe pain scrotal and gluteal swelling that has been ongoing for several days he says has been taking his medication regularly but has not been able to lose much fluid he has some shortness of breath but mostly has not been able to get out of bed. He is known to cardiology service from previous hospitalizations  Swelling has gotten worse left foot appears he does have severe peripheral vascular disease on arterial Doppler he has had peripheral interventions before. He is history of congestive heart failure and coronary artery disease. EF is in 40 to 45% range and a stress test few months ago in June 2021 showed no ischemia   He is a complicated medical history including history of coronary disease, multiple stents. Krissy Wall is to see Dr. Teresa Cambpell and then cardiology at [facility] had his ICD device placed for cardiomyopathy in 2010.  Since 2015. Krissy Wall is being seen at [facility] .  Dr. Femi Hernández shows EF of 50%. [facility] records indicated that he is to have Brando Duque was on aspirin and Plavix, and multiple stents in the past.  Most recent ICD interrogation does not show any A. fib burden.  He describes chest pressure intermittently.   He is currently on Plavix      Past medical history:    has a past medical history of Acid reflux, Acute MI ([address] Arthritis, Broken teeth, CAD (coronary artery disease), Cardiomyopathy ([address] CHF (congestive heart failure) ([address] Chronic back pain, Chronic kidney disease, Diabetes mellitus ([facility] Diabetic neuropathy ([facility] H/O cardiovascular stress test, H/O Doppler ultrasound, H/O percutaneous left heart catheterization, History of irregular heartbeat, History of syncope, Hyperlipidemia, Hypertension, Leg swelling, Necrotic toes (HCC), Neuropathy, neuropathy, PAD (peripheral artery disease) ([address] PVD (peripheral vascular disease) ([facility] [address] Sick sinus syndrome ([facility] [address] Sleep apnea, Spinal stenosis, Teeth missing, Type 2 diabetes mellitus without complication ([facility] [address] and WD-Chronic foot ulcer, left, with necrosis of bone ([facility] Past surgical history:   has a past surgical history that includes Coronary angioplasty with stent; Dental surgery; Colonoscopy (08/04/2016); pacemaker placement (06/04/2010); vascular surgery; colectomy (Right, 08/26/2016); Toe amputation (Right, 09/12/2017); Toe amputation (Right, 01/09/2018); Cardiac catheterization; Cardiac defibrillator placement (06/04/2010); Coronary angioplasty; Cardiac catheterization (07/14/2017); Cardiac catheterization (11/20/2018); Toe amputation (Left, 12/26/2020); and IR TUNNELED CVC PLACE WO SQ PORT/PUMP > 5 YEARS (6/14/2021). Social History:   reports that he quit smoking about 3 months ago. His smoking use included cigars. He started smoking about 42 years ago. He has a 9.00 pack-year smoking history. He has never used smokeless tobacco. He reports current drug use. Drug: Marijuana Sandee Peals). He reports that he does not drink alcohol.   Family history:   no family history of CAD, STROKE of DM at early age    Allergies   Allergen Reactions    Pcn [Penicillins] Hives    Fentanyl Itching       vancomycin (VANCOCIN) 2,000 mg in dextrose 5 % 500 mL IVPB, Once  vancomycin (VANCOCIN) intermittent dosing (placeholder), See Admin Instructions  magnesium sulfate 2000 mg in 50 mL IVPB premix, Once  furosemide (LASIX) injection 80 mg, 4x Daily  chlorothiazide (DIURIL) 500 mg in sodium chloride 0.9 % 50 mL IVPB, Q12H  albuterol sulfate  (90 Base) MCG/ACT inhaler 2 puff, Q4H PRN  atorvastatin (LIPITOR) tablet 40 mg, Nightly  carvedilol (COREG) tablet 3.125 mg, BID  clopidogrel (PLAVIX) tablet 75 mg, Daily  metaxalone (SKELAXIN) tablet 800 mg, TID  tiotropium (SPIRIVA RESPIMAT) 2.5 MCG/ACT inhaler 2 puff, Daily  sodium chloride flush 0.9 % injection 5-40 mL, 2 times per day  sodium chloride flush 0.9 % injection 5-40 mL, PRN  0.9 % sodium chloride infusion, PRN  ondansetron (ZOFRAN-ODT) disintegrating tablet 4 mg, Q8H PRN   Or  ondansetron (ZOFRAN) injection 4 mg, Q6H PRN  polyethylene glycol (GLYCOLAX) packet 17 g, Daily PRN  acetaminophen (TYLENOL) tablet 650 mg, Q6H PRN   Or  acetaminophen (TYLENOL) suppository 650 mg, Q6H PRN  heparin (porcine) injection 5,000 Units, 3 times per day  lisinopril (PRINIVIL;ZESTRIL) tablet 5 mg, Daily  spironolactone (ALDACTONE) tablet 25 mg, Daily  glucose (GLUTOSE) 40 % oral gel 15 g, PRN  glucagon (rDNA) injection 1 mg, PRN  dextrose 5 % solution, PRN  insulin lispro (HUMALOG) injection vial 0-18 Units, 4x Daily AC & HS  insulin lispro (HUMALOG) injection vial 0-9 Units, Nightly  acetaminophen (TYLENOL) tablet 650 mg, Q6H PRN  ondansetron (ZOFRAN) injection 4 mg, Q6H PRN  oxyCODONE (ROXICODONE) immediate release tablet 5 mg, Q4H PRN  benzonatate (TESSALON) capsule 100 mg, TID PRN  melatonin tablet 3 mg, Nightly PRN  ALPRAZolam (XANAX) tablet 0.25 mg, Nightly PRN  aluminum & magnesium hydroxide-simethicone (MAALOX) 200-200-20 MG/5ML suspension 30 mL, Q6H PRN  morphine (PF) injection 2 mg, Q4H PRN  dextrose bolus (hypoglycemia) 10% 125 mL, PRN   Or  dextrose bolus (hypoglycemia) 10% 250 mL, PRN      Current Facility-Administered Medications   Medication Dose Route Frequency Provider Last Rate Last Admin    vancomycin (VANCOCIN) 2,000 mg in dextrose 5 % 500 mL IVPB  2,000 mg IntraVENous Once MICKY Marquez        vancomycin [facility]) intermittent dosing (placeholder)   Other See Admin Instructions MICKY Marquez magnesium sulfate 2000 mg in 50 mL IVPB premix  2,000 mg IntraVENous Once Jamey Castañeda MD        furosemide (LASIX) injection 80 mg  80 mg IntraVENous 4x Daily Jamey Castañeda MD        chlorothiazide (DIURIL) 500 mg in sodium chloride 0.9 % 50 mL IVPB  500 mg IntraVENous Q12H Jamey Castañeda MD        albuterol sulfate  (90 Base) MCG/ACT inhaler 2 puff  2 puff Inhalation Q4H PRN Saundra Avendano PA-C        atorvastatin (LIPITOR) tablet 40 mg  40 mg Oral Nightly Saundra Avendano PA-C   40 mg at 01/22/22 2027    carvedilol (COREG) tablet 3.125 mg  3.125 mg Oral BID Saundra Avendano PA-C   3.125 mg at 01/23/22 3524    clopidogrel (PLAVIX) tablet 75 mg  75 mg Oral Daily Saundra Avendano PA-C   75 mg at 01/23/22 9515    metaxalone (SKELAXIN) tablet 800 mg  800 mg Oral TID Saundra Avendano PA-C        tiotropium (SPIRIVA RESPIMAT) 2.5 MCG/ACT inhaler 2 puff  2 puff Inhalation Daily Saundra Avendano PA-C   2 puff at 01/23/22 0744    sodium chloride flush 0.9 % injection 5-40 mL  5-40 mL IntraVENous 2 times per day Saundra Avendano PA-C   10 mL at 01/23/22 1491    sodium chloride flush 0.9 % injection 5-40 mL  5-40 mL IntraVENous PRN Saundra Avendano PA-C        0.9 % sodium chloride infusion  25 mL IntraVENous PRN Saundra Avendano PA-C        ondansetron (ZOFRAN-ODT) disintegrating tablet 4 mg  4 mg Oral Q8H PRN Saundra Avendano PA-C        Or    [facility]dansetron [facility]) injection 4 mg  4 mg IntraVENous Q6H PRN Saundra Avendano PA-C        polyethylene glycol (GLYCOLAX) packet 17 g  17 g Oral Daily PRN Saundra Avendano PA-C        acetaminophen (TYLENOL) tablet 650 mg  650 mg Oral Q6H PRN Saundra Avendano PA-C        Or    acetaminophen (TYLENOL) suppository 650 mg  650 mg Rectal Q6H PRN Saundra Avendano PA-C        heparin (porcine) injection 5,000 Units  5,000 Units SubCUTAneous 3 times per day Saundra Avendano PA-C   5,000 Units at 01/23/22 0439    lisinopril (PRINIVIL;ZESTRIL) tablet 5 mg  5 mg Oral Daily Saundra Avendano PA-C   5 mg at 01/23/22 2365    spironolactone (ALDACTONE) tablet 25 mg  25 mg Oral Daily JOANN NielsenC   25 mg at 01/23/22 3978    glucose (GLUTOSE) 40 % oral gel 15 g  15 g Oral PRN Ronald Turner PA-C        glucagon (rDNA) injection 1 mg  1 mg IntraMUSCular PRN WARD Nielsen-RACH        dextrose 5 % solution  100 mL/hr IntraVENous PRN WARD Nielsen-C        insulin lispro (HUMALOG) injection vial 0-18 Units  0-18 Units SubCUTAneous 4x Daily AC & HS JOANN NielsenC   6 Units at 01/22/22 2024    insulin lispro (HUMALOG) injection vial 0-9 Units  0-9 Units SubCUTAneous Nightly Ronald Turner PA-C        acetaminophen (TYLENOL) tablet 650 mg  650 mg Oral Q6H PRN Ronald Turner PA-C        ondansetron [facility]) injection 4 mg  4 mg IntraVENous Q6H PRN Ronald Turner PA-C        oxyCODONE (ROXICODONE) immediate release tablet 5 mg  5 mg Oral Q4H PRN JOANN NielsenC   5 mg at 01/23/22 6308    benzonatate (TESSALON) capsule 100 mg  100 mg Oral TID PRN Ronald Turner PA-C        melatonin tablet 3 mg  3 mg Oral Nightly PRN Ronald Turner PA-C        ALPRAZolam Christia Priestly) tablet 0.25 mg  0.25 mg Oral Nightly PRN WARD Nielsen-C   0.25 mg at 01/23/22 0045    aluminum & magnesium hydroxide-simethicone (MAALOX) 200-200-20 MG/5ML suspension 30 mL  30 mL Oral Q6H PRN Ronald Turner PA-C        morphine (PF) injection 2 mg  2 mg IntraVENous Q4H PRN WARD Nielsen-C   2 mg at 01/23/22 8065    dextrose bolus (hypoglycemia) 10% 125 mL  125 mL IntraVENous PRN Ronald Turner PA-C        Or    dextrose bolus (hypoglycemia) 10% 250 mL  250 mL IntraVENous PRN Ronald Turner PA-C         Review of Systems:   · Constitutional: No Fever or Weight Loss   · Eyes: No Decreased Vision  · ENT: No Headaches, Hearing Loss or Vertigo  · Cardiovascular: As per HPI  · Respiratory: As per HPI  · Gastrointestinal: No abdominal pain, appetite loss, blood in stools, constipation, diarrhea or heartburn  · Genitourinary: No dysuria, trouble voiding, or hematuria  · Musculoskeletal:  No gait disturbance, weakness or joint 01/23/22  0657   *   K 4.9      CO2 24   BUN 35*   CREATININE 2.2*     Recent Labs     01/22/22  0400   AST 37   ALT 22   BILITOT 0.9   ALKPHOS 135*     Recent Labs     01/22/22  0400 01/22/22  1135 01/23/22  0657   TROPONINT 0.031* 0.034* 0.048*       Recent Labs     01/22/22  0400   PROBNP 7,213*     Lab Results   Component Value Date    INR 1.03 08/30/2021    PROTIME 13.3 08/30/2021       EKG: (reviewed by myself)    ECHO:(reviewed by myself)    Chest Xray:(reviewed by myself)  CT ABDOMEN PELVIS WO CONTRAST Additional Contrast? None    Result Date: 1/22/2022  EXAMINATION: CT OF THE ABDOMEN AND PELVIS WITHOUT CONTRAST 1/22/2022 4:00 am TECHNIQUE: CT of the abdomen and pelvis was performed without the administration of intravenous contrast. Multiplanar reformatted images are provided for review. Dose modulation, iterative reconstruction, and/or weight based adjustment of the mA/kV was utilized to reduce the radiation dose to as low as reasonably achievable. COMPARISON: None. HISTORY: ORDERING SYSTEM PROVIDED HISTORY: reports scrotal drainage; concern for signs of infection TECHNOLOGIST PROVIDED HISTORY: Reason for exam:->reports scrotal drainage; concern for signs of infection Additional Contrast?->None Decision Support Exception - unselect if not a suspected or confirmed emergency medical condition->Emergency Medical Condition (MA) Reason for Exam: reports scrotal drainage; concern for signs of infection FINDINGS: Lower Chest: Small right-sided pleural effusion with adjacent atelectasis is noted. Organs: Cholelithiasis is present. The liver, spleen, adrenal glands, pancreas, and kidneys are unremarkable. GI/Bowel: There is no bowel obstruction. Postsurgical changes are seen to the colon. Pelvis: The bladder is unremarkable. There is no free fluid. Peritoneum/Retroperitoneum: There is no free air. Trace fluid is seen within the abdomen. Bones/Soft Tissues: Diffuse body wall edema is noted.   There is no evidence of soft tissue gas. No destructive osseous lesions are identified. The scrotum is not completely visualized. 1. Diffuse body wall edema is seen. There is no evidence of soft tissue gas. Scrotum is not completely visualized. 2. Small right pleural effusion with adjacent atelectasis. 3. Cholelithiasis. 4. Trace ascites within the abdomen. XR CHEST PORTABLE    Result Date: 1/22/2022  EXAMINATION: ONE XRAY VIEW OF THE CHEST 1/22/2022 5:21 am COMPARISON: 01/17/2022 HISTORY: ORDERING SYSTEM PROVIDED HISTORY: reports shortness of breath TECHNOLOGIST PROVIDED HISTORY: Reason for exam:->reports shortness of breath Reason for Exam: reports shortness of breath FINDINGS: The cardiac silhouette is mildly enlarged. There is a left-sided cardiac device. The lungs are clear. No pleural effusion or pneumothorax. The visualized osseous structures are unremarkable. 1. Cardiomegaly, unchanged. XR CHEST PORTABLE    Result Date: 1/17/2022  EXAMINATION: ONE XRAY VIEW OF THE CHEST 1/17/2022 12:16 am COMPARISON: October 28, 2021 HISTORY: ORDERING SYSTEM PROVIDED HISTORY: dyspnea TECHNOLOGIST PROVIDED HISTORY: Reason for exam:->dyspnea Reason for Exam: dyspnea Additional signs and symptoms: dyspnea Relevant Medical/Surgical History: dyspnea FINDINGS: Left chest wall AICD in place. Cardiomediastinal silhouette is stable. The lungs show focal consolidation or pleural effusion. No pulmonary edema. No pneumothorax. No acute osseous abnormality. 1. No acute cardiopulmonary disease. 2. Mild cardiomegaly. All labs, medications and tests reviewed by myself including data  from outside source , patient and available family . Continue all other medications of all above medical condition listed as is.      Impression:  Principal Problem:    Acute on chronic HFrEF (heart failure with reduced ejection fraction) ([facility])  Active Problems:    Type 2 diabetes mellitus with diabetic polyneuropathy ([facility])    Scrotal

## 2022-01-23 NOTE — CONSULTS
Patient:   Deirdre Rai    Date:  22  :  1950, 70 y.o. Nephrologist: Jarrod Valiente MD  Provider: Ilan Gutierrez    Reason for Consult: CKD stage IV with severe fluid overload    Consult requested by : MICKY Shannon Do    Chief Complaint:   Increasing leg as well as scrotal and penile edema along with weight gain and pain    HISTORY OF PRESENT ILLNESS:   Mr. Gerry Hamilton is an unfortunate 70-year-old male who was brought to the emergency department via EMS yesterday which is 2022 with above-mentioned symptoms. In the emergency department initially he was hypotensive and underwent several diagnostic tests which include imaging and biochemical.  Imaging study including CT of the abdomen and pelvis and chest x-ray showed cardiomegaly right pleural effusion and significant anasarca and ascites. Biochemical testing showed creatinine close to his baseline of 2.2 with acceptable electrolyte. Other pertinent abnormal lab include anemia with hemoglobin 9.5 g/dL his albumin level was 3.1 he started on 40 mg IV Lasix and subsequently admitted for further evaluation. When I saw him just 10 to 15 minutes ago, he is writhing and uncomfortable because of scrotal and penile pain. He does have a Fuller catheter and producing some urine. He is lying flat without any respiratory distress. He was very happy to see me. His blood pressure seems to be controlled. I am of course very familiar with him. I been doing him almost last 12 years or so. Briefly speaking he has progressive kidney disease with creatinine close to 2 which, due to underlying cardiomyopathy, cardiorenal syndrome, hypertension and atherosclerotic cardiovascular disease. His biggest problem is fluid retention mainly right heart failure pattern. Also has significant lower extremity peripheral arterial disease-causing chronic soft tissue, skin and bone infection. He has had several angiographic intervention.     My last encounter with her was in 12/2/2021. When I saw him after his inpatient stay. His creatinine was 1.8 at that time but obviously it fluctuates due to cardiorenal syndrome type II which frequently transformed to type I. His blood pressure was high 160/60 and had 3+ leg edema. I opted not to change any of the regimen as he was scheduled to get CO2 angiogram on 12/14/2021. I am unsure whether that was done      PAST MEDICAL HISTORY:  1.  CKD stage IIIA/B A3 with several acute kidney injuries, mainly by  creatinine criteria of course. 2.  Cardiorenal syndrome type 2 frequently transforming to type 1, but  has not been doing it since 12/2020.  His diabetes mellitus is  longstanding, it was not very well controlled, had significant  proteinuria. 3.  Hypertension, also was not very well controlled. 4.  Coronary artery disease.  He has had several stents long time ago by  Dr. Rupal Omalley also had an ICD placement.  His EF was about 45% or so  unless there is any recent echocardiogram done. 5.  Severe atherosclerotic cardiovascular disease involving several  vascular beds, mainly the lower extremity too.  He had angiogram  intervention and toe amputation as I mentioned earlier. 6.  Chronic leg edema, which is pretty much minimal now. 7.  Probable autonomic dysfunction.  He did have some orthostatic  hypotension before, although it could be multifactorial.     PAST SURGICAL HISTORY:  1.  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 2.  Angiogram several of them. 3.  Pacemaker and ICD placement.   4.  Coronary angiogram too, had intervention in the past.     FAMILY HISTORY:  Coronary artery disease runs in the family. Eloisa Blake has  advanced kidney disease or is on dialysis.     SOCIAL HISTORY:  The patient is . [facility] was born in [facility],  where he moved to [place] for a while and back here since, I  think, in 2016. [facility] does have some extended family. [facility] lives alone at  home. [facility] does probably have some home healthcare. [facility] does have an  electrical scooter.  I am assuming that he has some social support, but  may be I am wrong, I need to double check with him.     HABITS:  He does not smoke.  No history of alcohol or illicit drug  abuse.       REVIEW OF SYSTEMS:     All pertinent ROS neg except   Increasing leg edema, scrotal and penile edema-with associated pain    Current Facility-Administered Medications   Medication Dose Route Frequency Provider Last Rate Last Admin    vancomycin (VANCOCIN) 2,000 mg in dextrose 5 % 500 mL IVPB  2,000 mg IntraVENous Once MICKY Dowling        vancomycin [facility]) intermittent dosing (placeholder)   Other See Admin Instructions MICKY Dowling        magnesium sulfate 2000 mg in 50 mL IVPB premix  2,000 mg IntraVENous Once Kemi Murcia MD        furosemide (LASIX) injection 80 mg  80 mg IntraVENous 4x Daily Kemi Murcia MD        chlorothiazide (DIURIL) 500 mg in sodium chloride 0.9 % 50 mL IVPB  500 mg IntraVENous Q12H Kemi Murcia MD        albuterol sulfate  (90 Base) MCG/ACT inhaler 2 puff  2 puff Inhalation Q4H PRN Carie Miner PA-C        atorvastatin (LIPITOR) tablet 40 mg  40 mg Oral Nightly Carie Miner PA-C   40 mg at 01/22/22 2027    carvedilol (COREG) tablet 3.125 mg  3.125 mg Oral BID Carie Miner PA-C   3.125 mg at 01/23/22 1350    clopidogrel (PLAVIX) tablet 75 mg  75 mg Oral Daily Carie Miner PA-C   75 mg at 01/23/22 1395    metaxalone (SKELAXIN) tablet 800 mg  800 mg Oral TID Carie Miner PA-C        tiotropium (SPIRIVA RESPIMAT) 2.5 MCG/ACT inhaler 2 puff  2 puff Inhalation Daily Carie Miner PA-C   2 puff at 01/23/22 0744    sodium chloride flush 0.9 % injection 5-40 mL  5-40 mL IntraVENous 2 times per day JOANN MendozaC   10 mL at 01/23/22 3741    sodium chloride flush 0.9 % injection 5-40 mL  5-40 mL IntraVENous PRN WARD Mendoza-RACH        0.9 % sodium chloride infusion  25 mL IntraVENous PRN Carie Miner PA-C        ondansetron (ZOFRAN-ODT) disintegrating tablet 4 mg  4 mg Oral Q8H PRN Kimo Level, PA-C        Or    ondansetron [facility]) injection 4 mg  4 mg IntraVENous Q6H PRN Kimo Level, PA-C        polyethylene glycol [facility]) packet 17 g  17 g Oral Daily PRN Kimo Level, PA-C        acetaminophen (TYLENOL) tablet 650 mg  650 mg Oral Q6H PRN Kimo Level, PA-C        Or    acetaminophen (TYLENOL) suppository 650 mg  650 mg Rectal Q6H PRN Kimo Level, PA-C        heparin (porcine) injection 5,000 Units  5,000 Units SubCUTAneous 3 times per day Kimo Level, PA-C   5,000 Units at 01/23/22 0439    lisinopril (PRINIVIL;ZESTRIL) tablet 5 mg  5 mg Oral Daily Kimo Level, PA-C   5 mg at 01/23/22 1131    spironolactone (ALDACTONE) tablet 25 mg  25 mg Oral Daily Kimo Level, PA-C   25 mg at 01/23/22 9554    hydrALAZINE (APRESOLINE) injection 10 mg  10 mg IntraVENous Q6H PRN Kimo Level, PA-C        glucose (GLUTOSE) 40 % oral gel 15 g  15 g Oral PRN Kimo Level, PA-C        glucagon (rDNA) injection 1 mg  1 mg IntraMUSCular PRN Kimo Level, PA-C        dextrose 5 % solution  100 mL/hr IntraVENous PRN Kimo Level, PA-C        insulin lispro (HUMALOG) injection vial 0-18 Units  0-18 Units SubCUTAneous 4x Daily AC & HS Kimo Level, PA-C   6 Units at 01/22/22 2024    insulin lispro (HUMALOG) injection vial 0-9 Units  0-9 Units SubCUTAneous Nightly Kimo Level, PA-C        acetaminophen (TYLENOL) tablet 650 mg  650 mg Oral Q6H PRN Kimo Level, PA-C        [facility]ron [facility]) injection 4 mg  4 mg IntraVENous Q6H PRN Kimo Level, PA-C        oxyCODONE (ROXICODONE) immediate release tablet 5 mg  5 mg Oral Q4H PRN Kimo Level, PA-C   5 mg at 01/23/22 4992    benzonatate (TESSALON) capsule 100 mg  100 mg Oral TID PRN Kimo Level, PA-C        melatonin tablet 3 mg  3 mg Oral Nightly PRN Kimo Level, PA-C        ALPRAZolam Edi Brown) tablet 0.25 mg  0.25 mg Oral Nightly PRN Kimo Level, PA-C   0.25 mg at 01/23/22 0045    aluminum & magnesium hydroxide-simethicone (MAALOX) 266-311-43 MG/5ML suspension 30 mL  30 mL Oral Q6H PRN Vonnie Pearce PA-C        morphine (PF) injection 2 mg  2 mg IntraVENous Q4H PRN Vonnie Pearce PA-C   2 mg at 01/23/22 0439    dextrose bolus (hypoglycemia) 10% 125 mL  125 mL IntraVENous PRN Vonnie Pearce PA-C        Or    dextrose bolus (hypoglycemia) 10% 250 mL  250 mL IntraVENous PRN Danuta Henderson PA-C           /66   Pulse 63   Temp 98.2 °F (36.8 °C)   Resp 16   Ht 6' (1.829 m)   Wt 270 lb (122.5 kg)   SpO2 95%   BMI 36.62 kg/m²     PHYSICAL EXAM:  General appearance: alert awake in distress due to pain  HEENT: at least 2+ conjunctival pallor  Neck: supple  Heart: irregular -left chest wall cardiac device which is nontender  LUNGS: crackles at base   Abdomen: soft, abdominal wall edema  Extremities: significant scrotal, penile and at least 3+ lower extremity edema  He does have a Fuller catheter  Significant stasis dermatitis is  Several missing toes    LABS:  CBC:   Lab Results   Component Value Date    WBC 7.7 01/22/2022    WBC 6.3 01/17/2022    WBC 7.4 10/29/2021    HGB 9.5 01/22/2022    HGB 9.3 01/17/2022    HGB 9.4 10/29/2021     01/22/2022     01/17/2022     10/29/2021     Renal Panel:   Lab Results   Component Value Date     01/23/2022     01/22/2022     01/17/2022    K 4.9 01/23/2022    K 5.1 01/22/2022    K 5.2 01/17/2022    K 5.1 01/10/2018     01/23/2022     01/22/2022     01/17/2022    CO2 24 01/23/2022    CO2 23 01/22/2022    CO2 24 01/17/2022    BUN 35 01/23/2022    BUN 37 01/22/2022    BUN 21 01/17/2022    CREATININE 2.2 01/23/2022    CREATININE 2.2 01/22/2022    CREATININE 2.0 01/17/2022    GFRAA 36 01/23/2022    GFRAA 36 01/22/2022    GFRAA 40 01/17/2022    LABGLOM 30 01/23/2022    LABGLOM 30 01/22/2022    LABGLOM 33 01/17/2022    LABALBU 3.1 01/22/2022    LABALBU 3.0 01/17/2022    LABALBU 3.3 10/30/2021         Calcium:    Lab Results   Component Value Date    CALCIUM 7.6 01/23/2022    PTH 78 08/28/2015     Phosphorus:    Lab Results   Component Value Date    PHOS 3.1 10/30/2021       U/A:    Lab Results   Component Value Date    PROTEINU 100 10/29/2021    NITRU NEGATIVE 10/29/2021    COLORU YELLOW 10/29/2021    PHUR 5.0 08/19/2016    WBCUA 1 10/29/2021    RBCUA 13 10/29/2021    MUCUS RARE 10/29/2021    TRICHOMONAS NONE SEEN 10/29/2021    BACTERIA RARE 10/29/2021    CLARITYU CLEAR 10/29/2021    SPECGRAV 1.012 10/29/2021    UROBILINOGEN 2.0 10/29/2021    BILIRUBINUR NEGATIVE 10/29/2021    BLOODU SMALL 10/29/2021    KETUA NEGATIVE 10/29/2021         Increasing leg edema,  IMPRESSION:  1. progressive CKD stage IV A1-mainly from orthostatic cardiovascular disease as well as cardiorenal syndrome  2. Severe fluid overload with cardiomegaly predominantly right heart failure unlikely venous thromboembolism  3. Coronary artery disease along with severe peripheral arterial disease-status post PCI as well as ICD placement with most recent EF is 45%  4.   Hypertension atherosclerotic cardiovascular disease and dyslipidemia    PLAN:    Start with UA and urinary indicis  Intensify diuretics 80 mg IV 3 times daily  Also IV thiazide  Okay to keep the spironolactone for now  Also okay to keep small dose of ACE inhibitor's for the time being  I suspect as we diuresed him his blood pressure will go down  I will discontinue doxazosin-as I expect his blood pressure to go down besides it can cause sometimes fluid retention  Also will discontinue gabapentin for fluid retention  Watch for iatrogenic nosocomial complication  With unpredictable kidney function suggest vancomycin level daily for now  Follow clinically and biochemically

## 2022-01-23 NOTE — PROGRESS NOTES
Hospitalist Progress Note      Name:  Scout Will /Age/Sex: 1950  (70 y.o. male)   MRN & CSN:  [id number] & [id number] Admission Date/Time: 2022  3:40 AM   [facility]  [facility] PCP: Robyn Munoz Day: 2    Assessment and Plan:   Scout Will is a 70 y.o.  male  who presents with Acute on chronic HFrEF (heart failure with reduced ejection fraction) ([facility].)     #Acute on chronic heart failure with reduced EF   -Continue on Lasix 40 Mg IV twice a day  -Improving swelling in bilateral feet  -Cardiology consult    #Elevated troponin  -Slightly increasing from 0.034-0.048  -Cardiology consult    #Scrotal edema  -Chronic due to CHF  -Worsening pain in bilateral scrotums today  -Nurse noted purulent drainage from penis this morning  -Culture ordered, ultrasound of the scrotum ordered, urology consulted and appreciate their recommendations  -UA, GC chlamydia pending    #CKD stage III  -Creatinine 2.2, on baseline  -Nephrology consult, appreciate recommendation for diuretics treatment.     #Hypertensive urgency (resolved)  -Blood pressure 130s over 66 today   -Continue home meds lisinopril, spironolactone, doxazosin, coreg     #Diabetes mellitus type 2  -Continue insulin sliding scale    Discussed the case with Dr. Camilla Castillo. Diet ADULT DIET; Regular; 5 carb choices (75 gm/meal);  Low Sodium (2 gm)   DVT Prophylaxis [] Lovenox, [x]  Heparin, [] SCDs, [] Ambulation   GI Prophylaxis [] PPI,  [] H2 Blocker,  [] Carafate,  [] Diet/Tube Feeds   Code Status Full Code   Disposition Patient requires continued admission due to medical condition     History of Present Illness:     Chief Complaint: Acute on chronic HFrEF (heart failure with reduced ejection fraction) ([facility].)  Scotu Will is a 70 y.o.  male with a history of a type 2 diabetes, hypertension, hyperlipidemia, sick sinus syndrome,, CKD, congestive heart failure presents with worsening shortness of breath and lower extremity edema for few days. Patient was found significant elevated BNP at 7699. Patient was admitted to hospital for IV diuretics. Patient reported bilateral scrotum worsening pain today. Fuller catheter inserted yesterday. The nurse noticed some purulent drainage from penis this morning. Patient has no chills no fever. Culture collected. Started antibiotics. Urology consulted. Patient was also found elevated troponin at 0.048. Cardiology consulted. Ten point ROS reviewed negative, unless as noted above    Objective: Intake/Output Summary (Last 24 hours) at 1/23/2022 1126  Last data filed at 1/22/2022 1337  Gross per 24 hour   Intake --   Output 1500 ml   Net -1500 ml      Vitals:   Vitals:    01/23/22 0848   BP: 133/66   Pulse: 63   Resp: 16   Temp: 98.2 °F (36.8 °C)   SpO2: 95%     Physical Exam:   GEN Awake male, sitting upright in bed in no apparent distress. Appears given age. EYES Pupils are equally round. No scleral erythema, discharge, or conjunctivitis. HENT Mucous membranes are moist. Oral pharynx without exudates, no evidence of thrush. NECK Supple, no apparent thyromegaly or masses. RESP Clear to auscultation, no wheezes, rales or rhonchi. Symmetric chest movement while on room air. CARDIO/VASC S1/S2 auscultated. Regular rate without appreciable murmurs, rubs, or gallops. No JVD or carotid bruits. Peripheral pulses equal bilaterally and palpable. No peripheral edema. GI Abdomen is soft without significant tenderness, masses, or guarding. Bowel sounds are normoactive. Rectal exam deferred.  bilateral scrotum swelling, erythema, warmth and tender to palpation, purulent drainage noted from penis  HEME/LYMPH No palpable cervical lymphadenopathy and no hepatosplenomegaly. No petechiae or ecchymoses. MSK No gross joint deformities. SKIN Normal coloration, warm, dry. NEURO Cranial nerves appear grossly intact, normal speech, no lateralizing weakness. PSYCH Awake, alert, oriented x 4. Affect appropriate. Medications:   Medications:    atorvastatin  40 mg Oral Nightly    carvedilol  3.125 mg Oral BID    clopidogrel  75 mg Oral Daily    doxazosin  4 mg Oral BID    gabapentin  300 mg Oral TID    metaxalone  800 mg Oral TID    tiotropium  2 puff Inhalation Daily    sodium chloride flush  5-40 mL IntraVENous 2 times per day    heparin (porcine)  5,000 Units SubCUTAneous 3 times per day    lisinopril  5 mg Oral Daily    spironolactone  25 mg Oral Daily    furosemide  40 mg IntraVENous BID    insulin lispro  0-18 Units SubCUTAneous 4x Daily AC & HS    insulin lispro  0-9 Units SubCUTAneous Nightly      Infusions:    sodium chloride      dextrose       PRN Meds: albuterol sulfate HFA, 2 puff, Q4H PRN  sodium chloride flush, 5-40 mL, PRN  sodium chloride, 25 mL, PRN  ondansetron, 4 mg, Q8H PRN   Or  ondansetron, 4 mg, Q6H PRN  polyethylene glycol, 17 g, Daily PRN  acetaminophen, 650 mg, Q6H PRN   Or  acetaminophen, 650 mg, Q6H PRN  hydrALAZINE, 10 mg, Q6H PRN  glucose, 15 g, PRN  glucagon (rDNA), 1 mg, PRN  dextrose, 100 mL/hr, PRN  acetaminophen, 650 mg, Q6H PRN  ondansetron, 4 mg, Q6H PRN  oxyCODONE, 5 mg, Q4H PRN  benzonatate, 100 mg, TID PRN  melatonin, 3 mg, Nightly PRN  ALPRAZolam, 0.25 mg, Nightly PRN  aluminum & magnesium hydroxide-simethicone, 30 mL, Q6H PRN  morphine, 2 mg, Q4H PRN  dextrose bolus (hypoglycemia), 125 mL, PRN   Or  dextrose bolus (hypoglycemia), 250 mL, PRN          Patient is still admitted because medical condition.  The anticipated discharge is pending  Electronically signed by Dillon Wills on 1/23/2022 at 11:26 AM

## 2022-01-23 NOTE — CONSULTS
[facility] [address]   Consult Note  [facility] 1 2 3 4 5    Date: 2022   Patient: Cary Almanza   : 1950   DOA: 2022   MRN: [id number]   ROOM#: [id number]     Reason for Consult:  Scrotal pain and swelling; Requesting Physician:  Dr. Villa Basilio  Collaborating Urologist on Call at time of admission: Gumaro Dyson:  \"My balls hurt\"    History Obtained From:  patient    HISTORY OF PRESENT ILLNESS:                The patient is a 70 y.o. male with significant past medical history of CHF who presented with inability to ambulate and worsening LE edema - c/o pain at penis and scrotum    Past Medical History:        Diagnosis Date    Acid reflux     Acute MI ([facility] ,     Arthritis     Back    Broken teeth     Upper Front    CAD (coronary artery disease)     Sees Dr. Jerry Davila [facility])     per old chart    CHF (congestive heart failure) ([facility]     Chronic back pain     Chronic kidney disease     STAGE 3 KIDNEY FAILURE- \"from my diabetes- do not follow with any one- have seen Dr Ferdinand Dick in the past\"    Diabetes mellitus ([facility] Dx 1965    per old chart pt has been diabetic since age 13    Diabetic neuropathy ([facility]     \"in my feet\"    H/O cardiovascular stress test 2016    H/O Doppler ultrasound 2018    Moderate disease of the right lower extremity with an JALEN of 0.72.   Moderate to severe disease of the left lower extremity with an JALEN of 0.55.   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    Neuropathy     both feet    neuropathy     PAD (peripheral Current Medications:   Current Facility-Administered Medications: albuterol sulfate  (90 Base) MCG/ACT inhaler 2 puff, 2 puff, Inhalation, Q4H PRN  atorvastatin (LIPITOR) tablet 40 mg, 40 mg, Oral, Nightly  carvedilol (COREG) tablet 3.125 mg, 3.125 mg, Oral, BID  clopidogrel (PLAVIX) tablet 75 mg, 75 mg, Oral, Daily  doxazosin (CARDURA) tablet 4 mg, 4 mg, Oral, BID  gabapentin (NEURONTIN) capsule 300 mg, 300 mg, Oral, TID  metaxalone (SKELAXIN) tablet 800 mg, 800 mg, Oral, TID  tiotropium (SPIRIVA RESPIMAT) 2.5 MCG/ACT inhaler 2 puff, 2 puff, Inhalation, Daily  sodium chloride flush 0.9 % injection 5-40 mL, 5-40 mL, IntraVENous, 2 times per day  sodium chloride flush 0.9 % injection 5-40 mL, 5-40 mL, IntraVENous, PRN  0.9 % sodium chloride infusion, 25 mL, IntraVENous, PRN  ondansetron (ZOFRAN-ODT) disintegrating tablet 4 mg, 4 mg, Oral, Q8H PRN **OR** ondansetron (ZOFRAN) injection 4 mg, 4 mg, IntraVENous, Q6H PRN  polyethylene glycol (GLYCOLAX) packet 17 g, 17 g, Oral, Daily PRN  acetaminophen (TYLENOL) tablet 650 mg, 650 mg, Oral, Q6H PRN **OR** acetaminophen (TYLENOL) suppository 650 mg, 650 mg, Rectal, Q6H PRN  heparin (porcine) injection 5,000 Units, 5,000 Units, SubCUTAneous, 3 times per day  lisinopril (PRINIVIL;ZESTRIL) tablet 5 mg, 5 mg, Oral, Daily  spironolactone (ALDACTONE) tablet 25 mg, 25 mg, Oral, Daily  furosemide (LASIX) injection 40 mg, 40 mg, IntraVENous, BID  hydrALAZINE (APRESOLINE) injection 10 mg, 10 mg, IntraVENous, Q6H PRN  glucose (GLUTOSE) 40 % oral gel 15 g, 15 g, Oral, PRN  glucagon (rDNA) injection 1 mg, 1 mg, IntraMUSCular, PRN  dextrose 5 % solution, 100 mL/hr, IntraVENous, PRN  insulin lispro (HUMALOG) injection vial 0-18 Units, 0-18 Units, SubCUTAneous, 4x Daily AC & HS  insulin lispro (HUMALOG) injection vial 0-9 Units, 0-9 Units, SubCUTAneous, Nightly  acetaminophen (TYLENOL) tablet 650 mg, 650 mg, Oral, Q6H PRN  ondansetron (ZOFRAN) injection 4 mg, 4 mg, IntraVENous, Q6H PRN  oxyCODONE (ROXICODONE) immediate release tablet 5 mg, 5 mg, Oral, Q4H PRN  benzonatate (TESSALON) capsule 100 mg, 100 mg, Oral, TID PRN  melatonin tablet 3 mg, 3 mg, Oral, Nightly PRN  ALPRAZolam (XANAX) tablet 0.25 mg, 0.25 mg, Oral, Nightly PRN  aluminum & magnesium hydroxide-simethicone (MAALOX) 200-200-20 MG/5ML suspension 30 mL, 30 mL, Oral, Q6H PRN  morphine (PF) injection 2 mg, 2 mg, IntraVENous, Q4H PRN  dextrose bolus (hypoglycemia) 10% 125 mL, 125 mL, IntraVENous, PRN **OR** dextrose bolus (hypoglycemia) 10% 250 mL, 250 mL, IntraVENous, PRN    Allergies:  Pcn [penicillins] and Fentanyl    Social History:   TOBACCO:   reports that he quit smoking about 3 months ago. His smoking use included cigars. He started smoking about 42 years ago. He has a 9.00 pack-year smoking history. He has never used smokeless tobacco.  ETOH:   reports no history of alcohol use. DRUGS:   reports current drug use. Drug: Marijuana Andres Piper).     Family History:       Problem Relation Age of Onset    Diabetes Mother     Stroke Mother     High Blood Pressure Mother    Le Vision Loss Mother     Cancer Father         Prostate Cancer    Diabetes Sister     Neuropathy Sister     Other Sister         \"Breathing Problems\"    Heart Disease Sister     Early Death Sister 62        Heart Complications    Cancer Brother         \"Stomach Cancer\"    High Blood Pressure Brother     Diabetes Brother     Heart Disease Brother     High Blood Pressure Brother     Cancer Son         \"Testicle Cancer\"       REVIEW OF SYSTEMS:     CONSTITUTIONAL:  negative  EYES:  negative  HEENT:  negative  RESPIRATORY:  negative  CARDIOVASCULAR:  negative  GASTROINTESTINAL:  negative  GENITOURINARY:  Scrotal edema and tenderness without crepitance and no eschar  INTEGUMENT/BREAST:  negative  HEMATOLOGIC/LYMPHATIC:  negative  ALLERGIC/IMMUNOLOGIC:  negative  ENDOCRINE:  negative  MUSCULOSKELETAL:  negative  NEUROLOGICAL:  negative  BEHAVIOR/PSYCH: negative    PHYSICAL EXAM:      VITALS:  /66   Pulse 63   Temp 98.2 °F (36.8 °C)   Resp 16   Ht 6' (1.829 m)   Wt 270 lb (122.5 kg)   SpO2 95%   BMI 36.62 kg/m²      TEMPERATURE:  Current - Temp: 98.2 °F (36.8 °C); Max - Temp  Av.9 °F (36.6 °C)  Min: 97.5 °F (36.4 °C)  Max: 98.2 °F (36.8 °C)  24HR BLOOD PRESSURE RANGE:  Systolic (26LCL), AAA:458 , Min:131 , FJZ:353   ; Diastolic (30POP), YYA:56, Min:59, Max:80      /66   Pulse 63   Temp 98.2 °F (36.8 °C)   Resp 16   Ht 6' (1.829 m)   Wt 270 lb (122.5 kg)   SpO2 95%   BMI 36.62 kg/m²   General appearance: alert and cooperative  Abdomen: soft, non-tender; bowel sounds normal; no masses,  no organomegaly  Male genitalia: lymphedema of scrotum and shaft with no crepitance and no eschar and no driainage - no signs of infection    DATA:    WBC:    Lab Results   Component Value Date    WBC 7.7 2022     Hemoglobin/Hematocrit:    Lab Results   Component Value Date    HGB 9.5 2022    HCT 32.1 2022     BMP:    Lab Results   Component Value Date     2022    K 4.9 2022    K 5.1 01/10/2018     2022    CO2 24 2022    BUN 35 2022    LABALBU 3.1 2022    CREATININE 2.2 2022    CALCIUM 7.6 2022    GFRAA 36 2022    LABGLOM 30 2022     PT/INR:    Lab Results   Component Value Date    PROTIME 13.3 2021    PROTIME 11.2 2011    INR 1.03 2021       Blood Culture:   Urine Culture:     Imaging:  CT ABDOMEN PELVIS WO CONTRAST Additional Contrast? None    Result Date: 2022  EXAMINATION: CT OF THE ABDOMEN AND PELVIS WITHOUT CONTRAST 2022 4:00 am TECHNIQUE: CT of the abdomen and pelvis was performed without the administration of intravenous contrast. Multiplanar reformatted images are provided for review.  Dose modulation, iterative reconstruction, and/or weight based adjustment of the mA/kV was utilized to reduce the radiation dose to as low as reasonably achievable. COMPARISON: None. HISTORY: ORDERING SYSTEM PROVIDED HISTORY: reports scrotal drainage; concern for signs of infection TECHNOLOGIST PROVIDED HISTORY: Reason for exam:->reports scrotal drainage; concern for signs of infection Additional Contrast?->None Decision Support Exception - unselect if not a suspected or confirmed emergency medical condition->Emergency Medical Condition (MA) Reason for Exam: reports scrotal drainage; concern for signs of infection FINDINGS: Lower Chest: Small right-sided pleural effusion with adjacent atelectasis is noted. Organs: Cholelithiasis is present. The liver, spleen, adrenal glands, pancreas, and kidneys are unremarkable. GI/Bowel: There is no bowel obstruction. Postsurgical changes are seen to the colon. Pelvis: The bladder is unremarkable. There is no free fluid. Peritoneum/Retroperitoneum: There is no free air. Trace fluid is seen within the abdomen. Bones/Soft Tissues: Diffuse body wall edema is noted. There is no evidence of soft tissue gas. No destructive osseous lesions are identified. The scrotum is not completely visualized. 1. Diffuse body wall edema is seen. There is no evidence of soft tissue gas. Scrotum is not completely visualized. 2. Small right pleural effusion with adjacent atelectasis. 3. Cholelithiasis. 4. Trace ascites within the abdomen. XR CHEST PORTABLE    Result Date: 1/22/2022  EXAMINATION: ONE XRAY VIEW OF THE CHEST 1/22/2022 5:21 am COMPARISON: 01/17/2022 HISTORY: ORDERING SYSTEM PROVIDED HISTORY: reports shortness of breath TECHNOLOGIST PROVIDED HISTORY: Reason for exam:->reports shortness of breath Reason for Exam: reports shortness of breath FINDINGS: The cardiac silhouette is mildly enlarged. There is a left-sided cardiac device. The lungs are clear. No pleural effusion or pneumothorax. The visualized osseous structures are unremarkable. 1. Cardiomegaly, unchanged.      XR CHEST PORTABLE    Result Date: 1/17/2022  EXAMINATION: ONE XRAY VIEW OF THE CHEST 1/17/2022 12:16 am COMPARISON: October 28, 2021 HISTORY: ORDERING SYSTEM PROVIDED HISTORY: dyspnea TECHNOLOGIST PROVIDED HISTORY: Reason for exam:->dyspnea Reason for Exam: dyspnea Additional signs and symptoms: dyspnea Relevant Medical/Surgical History: dyspnea FINDINGS: Left chest wall AICD in place. Cardiomediastinal silhouette is stable. The lungs show focal consolidation or pleural effusion. No pulmonary edema. No pneumothorax. No acute osseous abnormality. 1. No acute cardiopulmonary disease. 2. Mild cardiomegaly. Assessment & Plan:      Rossy Campbell is a 70y.o. year old male admitted 1/22/2022 for scrotal pain and swelling    1) Exam today with lymphedema and no signs of infection - difficult to fully examine perirectal and perineal area - ct scan with no gas/infection but did not go all the way down thru scrotum - will get u/s tomorrow - scrotal elevation and ice - treat underlying lymohedema      Patient seen and examined, chart reviewed.      Electronically signed by Thelma Hobson MD on 1/23/2022 at 11:15 AM

## 2022-01-23 NOTE — ED NOTES
Report given to nurse on [address] [facility].       Feliciano Clamp  01/23/22 0005

## 2022-01-23 NOTE — PROGRESS NOTES
Patient arrived to floor via stretcher. Patient A&O x4 c/o pain at 10/10. Patients legs edematous, dry, and scaly. Patient has wound to previous left great toe amputation site and to right great and second toes, all of which are dressed. Patient scrotum and penis edematous. Penis has yellow, malodorous, drainage. Fuller in place, patent, and draining magi colored urine.

## 2022-01-23 NOTE — PROGRESS NOTES
[address] [facility]  consulted by MICKY Vela for monitoring and adjustment. Indication for treatment: SSTI  Goal trough: 10-15 mcg/mL  AUC/RJ:  <500    Pertinent Laboratory Values:   Temp Readings from Last 3 Encounters:   01/23/22 98.2 °F (36.8 °C)   01/16/22 97 °F (36.1 °C) (Oral)   01/06/22 98.3 °F (36.8 °C) (Temporal)     Recent Labs     01/22/22  0400   WBC 7.7     Recent Labs     01/22/22  0400 01/23/22  0657   BUN 37* 35*   CREATININE 2.2* 2.2*     Estimated Creatinine Clearance: 42 mL/min (A) (based on SCr of 2.2 mg/dL (H)). Intake/Output Summary (Last 24 hours) at 1/23/2022 1226  Last data filed at 1/22/2022 1337  Gross per 24 hour   Intake --   Output 1500 ml   Net -1500 ml       Pertinent Cultures:  Date    Source    Results  1/23   Wound    Ordered    Vancomycin level:   TROUGH:  No results for input(s): VANCOTROUGH in the last 72 hours. RANDOM:  No results for input(s): VANCORANDOM in the last 72 hours.     Assessment:  · WBC and temperature: WBC WNL  · SCr, BUN, and urine output:   · CKD, Scr at baseline 2.2  · Day(s) of therapy: 1  · Vancomycin concentration: to be collected    Plan:  · Vancomycin 2000 mg x1 dose  · Intermittent vancomycin dosing with CKD  · Plan to collect a random level tomorrow AM  · Pharmacy will continue to monitor patient and adjust therapy as indicated    [address] 1/24 @ 0600    Thank you for the consult,  Chester Voss [facility], PharmD  1/23/2022 12:26 PM

## 2022-01-24 ENCOUNTER — APPOINTMENT (OUTPATIENT)
Dept: ULTRASOUND IMAGING | Age: 72
DRG: 292 | End: 2022-01-24
Payer: MEDICARE

## 2022-01-24 LAB
ALBUMIN SERPL-MCNC: 2.8 GM/DL (ref 3.4–5)
ALP BLD-CCNC: 116 IU/L (ref 40–128)
ALT SERPL-CCNC: 19 U/L (ref 10–40)
ANION GAP SERPL CALCULATED.3IONS-SCNC: 10 MMOL/L (ref 4–16)
AST SERPL-CCNC: 34 IU/L (ref 15–37)
BASOPHILS ABSOLUTE: 0 K/CU MM
BASOPHILS RELATIVE PERCENT: 0.2 % (ref 0–1)
BILIRUB SERPL-MCNC: 1.8 MG/DL (ref 0–1)
BUN BLDV-MCNC: 40 MG/DL (ref 6–23)
CALCIUM SERPL-MCNC: 7.9 MG/DL (ref 8.3–10.6)
CHLORIDE BLD-SCNC: 103 MMOL/L (ref 99–110)
CO2: 23 MMOL/L (ref 21–32)
CREAT SERPL-MCNC: 2.2 MG/DL (ref 0.9–1.3)
DIFFERENTIAL TYPE: ABNORMAL
DOSE AMOUNT: NORMAL
DOSE TIME: NORMAL
EOSINOPHILS ABSOLUTE: 0.1 K/CU MM
EOSINOPHILS RELATIVE PERCENT: 0.7 % (ref 0–3)
ERYTHROCYTE SEDIMENTATION RATE: 78 MM/HR (ref 0–20)
GFR AFRICAN AMERICAN: 36 ML/MIN/1.73M2
GFR NON-AFRICAN AMERICAN: 30 ML/MIN/1.73M2
GLUCOSE BLD-MCNC: 103 MG/DL (ref 70–99)
GLUCOSE BLD-MCNC: 105 MG/DL (ref 70–99)
GLUCOSE BLD-MCNC: 128 MG/DL (ref 70–99)
GLUCOSE BLD-MCNC: 166 MG/DL (ref 70–99)
GLUCOSE BLD-MCNC: 182 MG/DL (ref 70–99)
GLUCOSE BLD-MCNC: 213 MG/DL (ref 70–99)
HCT VFR BLD CALC: 30 % (ref 42–52)
HEMOGLOBIN: 8.8 GM/DL (ref 13.5–18)
IMMATURE NEUTROPHIL %: 0.3 % (ref 0–0.43)
LV EF: 50 %
LVEF MODALITY: NORMAL
LYMPHOCYTES ABSOLUTE: 0.9 K/CU MM
LYMPHOCYTES RELATIVE PERCENT: 7.3 % (ref 24–44)
MAGNESIUM: 1.9 MG/DL (ref 1.8–2.4)
MCH RBC QN AUTO: 25.8 PG (ref 27–31)
MCHC RBC AUTO-ENTMCNC: 29.3 % (ref 32–36)
MCV RBC AUTO: 88 FL (ref 78–100)
MONOCYTES ABSOLUTE: 0.7 K/CU MM
MONOCYTES RELATIVE PERCENT: 5.8 % (ref 0–4)
NUCLEATED RBC %: 0 %
PDW BLD-RTO: 19.6 % (ref 11.7–14.9)
PHOSPHORUS: 2.9 MG/DL (ref 2.5–4.9)
PLATELET # BLD: 207 K/CU MM (ref 140–440)
PMV BLD AUTO: 10.3 FL (ref 7.5–11.1)
POTASSIUM SERPL-SCNC: 5 MMOL/L (ref 3.5–5.1)
RBC # BLD: 3.41 M/CU MM (ref 4.6–6.2)
SARS-COV-2, NAAT: NOT DETECTED
SEGMENTED NEUTROPHILS ABSOLUTE COUNT: 10.2 K/CU MM
SEGMENTED NEUTROPHILS RELATIVE PERCENT: 85.7 % (ref 36–66)
SODIUM BLD-SCNC: 136 MMOL/L (ref 135–145)
SOURCE: NORMAL
TOTAL IMMATURE NEUTOROPHIL: 0.04 K/CU MM
TOTAL NUCLEATED RBC: 0 K/CU MM
TOTAL PROTEIN: 6.1 GM/DL (ref 6.4–8.2)
VANCOMYCIN RANDOM: 11.8 UG/ML
WBC # BLD: 11.8 K/CU MM (ref 4–10.5)

## 2022-01-24 PROCEDURE — 93306 TTE W/DOPPLER COMPLETE: CPT

## 2022-01-24 PROCEDURE — 93975 VASCULAR STUDY: CPT

## 2022-01-24 PROCEDURE — 1200000000 HC SEMI PRIVATE

## 2022-01-24 PROCEDURE — 76870 US EXAM SCROTUM: CPT

## 2022-01-24 PROCEDURE — 99233 SBSQ HOSP IP/OBS HIGH 50: CPT | Performed by: INTERNAL MEDICINE

## 2022-01-24 PROCEDURE — 82962 GLUCOSE BLOOD TEST: CPT

## 2022-01-24 PROCEDURE — 94761 N-INVAS EAR/PLS OXIMETRY MLT: CPT

## 2022-01-24 PROCEDURE — 51702 INSERT TEMP BLADDER CATH: CPT

## 2022-01-24 PROCEDURE — 2580000003 HC RX 258: Performed by: NURSE PRACTITIONER

## 2022-01-24 PROCEDURE — 2700000000 HC OXYGEN THERAPY PER DAY

## 2022-01-24 PROCEDURE — 6360000002 HC RX W HCPCS: Performed by: INTERNAL MEDICINE

## 2022-01-24 PROCEDURE — 6360000002 HC RX W HCPCS: Performed by: NURSE PRACTITIONER

## 2022-01-24 PROCEDURE — 85025 COMPLETE CBC W/AUTO DIFF WBC: CPT

## 2022-01-24 PROCEDURE — 80048 BASIC METABOLIC PNL TOTAL CA: CPT

## 2022-01-24 PROCEDURE — 6360000002 HC RX W HCPCS: Performed by: PHYSICIAN ASSISTANT

## 2022-01-24 PROCEDURE — APPSS60 APP SPLIT SHARED TIME 46-60 MINUTES: Performed by: NURSE PRACTITIONER

## 2022-01-24 PROCEDURE — 6370000000 HC RX 637 (ALT 250 FOR IP): Performed by: PHYSICIAN ASSISTANT

## 2022-01-24 PROCEDURE — 87635 SARS-COV-2 COVID-19 AMP PRB: CPT

## 2022-01-24 PROCEDURE — 85652 RBC SED RATE AUTOMATED: CPT

## 2022-01-24 PROCEDURE — 2580000003 HC RX 258: Performed by: INTERNAL MEDICINE

## 2022-01-24 PROCEDURE — 36415 COLL VENOUS BLD VENIPUNCTURE: CPT

## 2022-01-24 PROCEDURE — 84100 ASSAY OF PHOSPHORUS: CPT

## 2022-01-24 PROCEDURE — 86140 C-REACTIVE PROTEIN: CPT

## 2022-01-24 PROCEDURE — 94640 AIRWAY INHALATION TREATMENT: CPT

## 2022-01-24 PROCEDURE — 80053 COMPREHEN METABOLIC PANEL: CPT

## 2022-01-24 PROCEDURE — 83735 ASSAY OF MAGNESIUM: CPT

## 2022-01-24 PROCEDURE — 80202 ASSAY OF VANCOMYCIN: CPT

## 2022-01-24 PROCEDURE — 2580000003 HC RX 258: Performed by: PHYSICIAN ASSISTANT

## 2022-01-24 RX ADMIN — SODIUM CHLORIDE, PRESERVATIVE FREE 10 ML: 5 INJECTION INTRAVENOUS at 10:36

## 2022-01-24 RX ADMIN — OXYCODONE HYDROCHLORIDE 5 MG: 5 TABLET ORAL at 18:40

## 2022-01-24 RX ADMIN — CLOPIDOGREL 75 MG: 75 TABLET, FILM COATED ORAL at 10:36

## 2022-01-24 RX ADMIN — VANCOMYCIN HYDROCHLORIDE 1500 MG: 5 INJECTION, POWDER, LYOPHILIZED, FOR SOLUTION INTRAVENOUS at 16:09

## 2022-01-24 RX ADMIN — HEPARIN SODIUM 5000 UNITS: 5000 INJECTION INTRAVENOUS; SUBCUTANEOUS at 06:28

## 2022-01-24 RX ADMIN — HEPARIN SODIUM 5000 UNITS: 5000 INJECTION INTRAVENOUS; SUBCUTANEOUS at 16:05

## 2022-01-24 RX ADMIN — HEPARIN SODIUM 5000 UNITS: 5000 INJECTION INTRAVENOUS; SUBCUTANEOUS at 22:07

## 2022-01-24 RX ADMIN — ATORVASTATIN CALCIUM 40 MG: 40 TABLET, FILM COATED ORAL at 22:06

## 2022-01-24 RX ADMIN — MORPHINE SULFATE 2 MG: 2 INJECTION, SOLUTION INTRAMUSCULAR; INTRAVENOUS at 22:07

## 2022-01-24 RX ADMIN — TIOTROPIUM BROMIDE INHALATION SPRAY 2 PUFF: 3.12 SPRAY, METERED RESPIRATORY (INHALATION) at 07:56

## 2022-01-24 RX ADMIN — CHLOROTHIAZIDE SODIUM 500 MG: 500 INJECTION, POWDER, LYOPHILIZED, FOR SOLUTION INTRAVENOUS at 18:40

## 2022-01-24 RX ADMIN — LISINOPRIL 5 MG: 5 TABLET ORAL at 10:36

## 2022-01-24 RX ADMIN — OXYCODONE HYDROCHLORIDE 5 MG: 5 TABLET ORAL at 05:08

## 2022-01-24 RX ADMIN — CARVEDILOL 3.12 MG: 6.25 TABLET, FILM COATED ORAL at 22:06

## 2022-01-24 RX ADMIN — CARVEDILOL 3.12 MG: 6.25 TABLET, FILM COATED ORAL at 10:36

## 2022-01-24 RX ADMIN — SPIRONOLACTONE 25 MG: 25 TABLET ORAL at 10:36

## 2022-01-24 RX ADMIN — CHLOROTHIAZIDE SODIUM 500 MG: 500 INJECTION, POWDER, LYOPHILIZED, FOR SOLUTION INTRAVENOUS at 02:51

## 2022-01-24 RX ADMIN — MORPHINE SULFATE 2 MG: 2 INJECTION, SOLUTION INTRAMUSCULAR; INTRAVENOUS at 02:47

## 2022-01-24 RX ADMIN — FUROSEMIDE 80 MG: 10 INJECTION, SOLUTION INTRAMUSCULAR; INTRAVENOUS at 16:04

## 2022-01-24 RX ADMIN — FUROSEMIDE 80 MG: 10 INJECTION, SOLUTION INTRAMUSCULAR; INTRAVENOUS at 22:07

## 2022-01-24 RX ADMIN — FUROSEMIDE 80 MG: 10 INJECTION, SOLUTION INTRAMUSCULAR; INTRAVENOUS at 10:29

## 2022-01-24 RX ADMIN — MORPHINE SULFATE 2 MG: 2 INJECTION, SOLUTION INTRAMUSCULAR; INTRAVENOUS at 16:14

## 2022-01-24 ASSESSMENT — PAIN SCALES - GENERAL
PAINLEVEL_OUTOF10: 10
PAINLEVEL_OUTOF10: 0
PAINLEVEL_OUTOF10: 9
PAINLEVEL_OUTOF10: 8

## 2022-01-24 ASSESSMENT — PAIN DESCRIPTION - LOCATION: LOCATION: SCROTUM

## 2022-01-24 ASSESSMENT — ENCOUNTER SYMPTOMS: SHORTNESS OF BREATH: 0

## 2022-01-24 ASSESSMENT — PAIN DESCRIPTION - DESCRIPTORS: DESCRIPTORS: PRESSURE

## 2022-01-24 NOTE — PROGRESS NOTES
Nephrology Progress Note  1/24/2022 3:00 PM        Subjective:   Admit Date: 1/22/2022  PCP: Heather Esparza    Interval History: Patient seen in early morning, this is a late entry    Diet: Eating some    ROS: Still complain of scrotal pain  No shortness of breath  Urine output recorded 1.35 L for the last shift  No fever    Data:     Current meds:    vancomycin  1,500 mg IntraVENous Once    vancomycin (VANCOCIN) intermittent dosing (placeholder)   Other See Admin Instructions    furosemide  80 mg IntraVENous 4x Daily    chlorothiazide (DIURIL) IVPB  500 mg IntraVENous Q12H    atorvastatin  40 mg Oral Nightly    carvedilol  3.125 mg Oral BID    clopidogrel  75 mg Oral Daily    metaxalone  800 mg Oral TID    tiotropium  2 puff Inhalation Daily    sodium chloride flush  5-40 mL IntraVENous 2 times per day    heparin (porcine)  5,000 Units SubCUTAneous 3 times per day    lisinopril  5 mg Oral Daily    spironolactone  25 mg Oral Daily    insulin lispro  0-18 Units SubCUTAneous 4x Daily AC & HS    insulin lispro  0-9 Units SubCUTAneous Nightly      sodium chloride      dextrose           I/O last 3 completed shifts:  In: -   Out: 1850 [Urine:1850]    CBC:   Recent Labs     01/22/22  0400   WBC 7.7   HGB 9.5*             Recent Labs     01/22/22  0400 01/23/22  0657 01/24/22  0715   * 134* 136   K 5.1 4.9 5.0    103 103   CO2 23 24 23   BUN 37* 35* 40*   CREATININE 2.2* 2.2* 2.2*   GLUCOSE 248* 56* 103*       Lab Results   Component Value Date    CALCIUM 7.9 (L) 01/24/2022    PHOS 2.9 01/24/2022       Objective:     Vitals: BP (!) 163/66   Pulse 63   Temp 98.1 °F (36.7 °C)   Resp 16   Ht 6' (1.829 m)   Wt 270 lb (122.5 kg)   SpO2 96%   BMI 36.62 kg/m²     General appearance: Alert, awake and oriented  HEENT: At least 2+ conjunctival pallor  Neck: Supple, he is with supplemental oxygen  Lungs: No gross crackles-coarse breath sound on posterior auscultation  Heart: Seems regular rate and rhythm, left chest wall cardiac device which is nontender  Abdomen: Soft, nontender  Extremities: At least 3+ thigh edema, scrotal and penile edema as well as leg edema  Significant stasis dermatitis  Chronic leg wound  He does have a Fuller      Problem List :         Impression :     1. Progressive CKD stage IV A1-stable so far-urine had some RBC and WBC but this is a Fuller specimen-no active sediment and minimal proteinuria-  2. Severe fluid overload-penoscrotal edema-predominantly right heart failure than the left  3. Underlying cardiomyopathy  4. Hypertension atherosclerotic cardiovascular disease-specifically peripheral arterial disease  5. Low sodium from excess total body water-with adequate free water clearance should,  6. Probable skin, soft tissue and even bone infection-on empirical antibiotic watch the vancomycin level    Recommendation/Plan  :     1. Continue intensive diuretics  2. Adjusted as he does  3. His creatinine may go up with diuretics-but the goal would be to reduce penile scrotal and leg edema-at least for symptom control-as well as reduction of skin infection risk  4. Watch for iatrogenic nosocomial complication  5. Follow clinically and biochemically  6.  Blood pressure expected to go down with adequate diuresis-and will adjust BP medication accordingly      Justino Pena MD MD

## 2022-01-24 NOTE — PROGRESS NOTES
[address] [facility]  consulted by MICKY Layton for monitoring and adjustment. Indication for treatment: SSTI  Goal trough: 10-15 mcg/mL  AUC/RJ:  <500    Pertinent Laboratory Values:   Temp Readings from Last 3 Encounters:   01/24/22 98.1 °F (36.7 °C)   01/16/22 97 °F (36.1 °C) (Oral)   01/06/22 98.3 °F (36.8 °C) (Temporal)     Recent Labs     01/22/22  0400   WBC 7.7     Recent Labs     01/22/22  0400 01/23/22  0657 01/24/22  0715   BUN 37* 35* 40*   CREATININE 2.2* 2.2* 2.2*     Estimated Creatinine Clearance: 42 mL/min (A) (based on SCr of 2.2 mg/dL (H)). Intake/Output Summary (Last 24 hours) at 1/24/2022 1225  Last data filed at 1/24/2022 1017  Gross per 24 hour   Intake --   Output 3200 ml   Net -3200 ml       Pertinent Cultures:  Date    Source    Results  1/23   Wound    Ordered    Vancomycin level:   TROUGH:  No results for input(s): VANCOTROUGH in the last 72 hours. RANDOM:    Recent Labs     01/24/22  0715   VANCORANDOM 11.8       Assessment:  · SCr, BUN, and urine output:   · CKD, Scr at baseline 2.2  · Day(s) of therapy: 2  · Vancomycin concentration:  · 1/24 - 11.8, 13 hour level post initial 2000mg ivpb dose    Plan:  · Intermittent vancomycin dosing with CKD  · Vanco level this am @11.8, ok to re-dose. · Give vancomycin 1500mg ivpb x1 dose today  · Recheck the vanco level tomorrow am  · Pharmacy will continue to monitor patient and adjust therapy as indicated    Brent 3 1/25 @ 0600    Thank you for the consult,  Alvaro Johnson.  Dar Hair [facility]  1/24/2022 12:25 PM

## 2022-01-24 NOTE — PROGRESS NOTES
Cardiology Progress Note     Today's Plan: Echo today. Admit Date:  1/22/2022    Consult reason/ Seen today for: Elevated Troponin     Subjective and  Overnight Events: Patient denies any chest pain and shortness of breath has improved. History of Presenting Illness:    Chief complain on admission : 70 y. o.year old who is admitted for  Chief Complaint   Patient presents with    Groin Swelling     scrotum very large and leaking fluid    Leg Swelling        Past medical history:    has a past medical history of Acid reflux, Acute MI ([address] Arthritis, Broken teeth, CAD (coronary artery disease), Cardiomyopathy ([address] CHF (congestive heart failure) ([address] Chronic back pain, Chronic kidney disease, Diabetes mellitus ([address] Diabetic neuropathy ([address] H/O cardiovascular stress test, H/O Doppler ultrasound, H/O percutaneous left heart catheterization, History of irregular heartbeat, History of syncope, Hyperlipidemia, Hypertension, Leg swelling, Necrotic toes (HCC), Neuropathy, neuropathy, PAD (peripheral artery disease) ([address] PVD (peripheral vascular disease) ([address] Sick sinus syndrome ([address] Sleep apnea, Spinal stenosis, Teeth missing, Type 2 diabetes mellitus without complication ([address] and WD-Chronic foot ulcer, left, with necrosis of bone ([address] Past surgical history:   has a past surgical history that includes Coronary angioplasty with stent; Dental surgery; Colonoscopy (08/04/2016); pacemaker placement (06/04/2010); vascular surgery; colectomy (Right, 08/26/2016); Toe amputation (Right, 09/12/2017); Toe amputation (Right, 01/09/2018); Cardiac catheterization; Cardiac defibrillator placement (06/04/2010); Coronary angioplasty; Cardiac catheterization (07/14/2017); Cardiac catheterization (11/20/2018); Toe amputation (Left, 12/26/2020); and IR TUNNELED CVC PLACE WO SQ PORT/PUMP > 5 YEARS (6/14/2021).   Social History: reports that he quit smoking about 3 months ago. His smoking use included cigars. He started smoking about 42 years ago. He has a 9.00 pack-year smoking history. He has never used smokeless tobacco. He reports current drug use. Drug: Marijuana Robyn Dean). He reports that he does not drink alcohol. Family history:  family history includes Cancer in his brother, father, and son; Diabetes in his brother, mother, and sister; Early Death (age of onset: 62) in his sister; Heart Disease in his brother and sister; High Blood Pressure in his brother, brother, and mother; Neuropathy in his sister; Other in his sister; Stroke in his mother; Vision Loss in his mother. Allergies   Allergen Reactions    Pcn [Penicillins] Hives    Fentanyl Itching       Review of Systems:  Review of Systems   Respiratory: Negative for shortness of breath. Cardiovascular: Negative for chest pain, palpitations and leg swelling. Musculoskeletal: Negative. Skin: Negative. Neurological: Negative for dizziness and weakness. All other systems reviewed and are negative. /61   Pulse 63   Temp 98.2 °F (36.8 °C)   Resp 16   Ht 6' (1.829 m)   Wt 270 lb (122.5 kg)   SpO2 95%   BMI 36.62 kg/m²       Intake/Output Summary (Last 24 hours) at 1/24/2022 0856  Last data filed at 1/24/2022 0252  Gross per 24 hour   Intake --   Output 1850 ml   Net -1850 ml       Physical Exam:  Physical Exam  Constitutional:       Appearance: He is well-developed. Cardiovascular:      Rate and Rhythm: Normal rate and regular rhythm. Pulses: Intact distal pulses. Dorsalis pedis pulses are 1+ on the right side and 1+ on the left side. Posterior tibial pulses are 1+ on the right side and 1+ on the left side. Heart sounds: Normal heart sounds, S1 normal and S2 normal.   Pulmonary:      Effort: Pulmonary effort is normal.      Breath sounds: Normal breath sounds. Musculoskeletal:         General: Normal range of motion. Right lower leg: Edema present. Left lower leg: Edema present. Skin:     General: Skin is warm and dry. Neurological:      Mental Status: He is alert and oriented to person, place, and time. Medications:    vancomycin (VANCOCIN) intermittent dosing (placeholder)   Other See Admin Instructions    furosemide  80 mg IntraVENous 4x Daily    chlorothiazide (DIURIL) IVPB  500 mg IntraVENous Q12H    atorvastatin  40 mg Oral Nightly    carvedilol  3.125 mg Oral BID    clopidogrel  75 mg Oral Daily    metaxalone  800 mg Oral TID    tiotropium  2 puff Inhalation Daily    sodium chloride flush  5-40 mL IntraVENous 2 times per day    heparin (porcine)  5,000 Units SubCUTAneous 3 times per day    lisinopril  5 mg Oral Daily    spironolactone  25 mg Oral Daily    insulin lispro  0-18 Units SubCUTAneous 4x Daily AC & HS    insulin lispro  0-9 Units SubCUTAneous Nightly      sodium chloride      dextrose       albuterol sulfate HFA, sodium chloride flush, sodium chloride, ondansetron **OR** ondansetron, polyethylene glycol, acetaminophen **OR** acetaminophen, glucose, glucagon (rDNA), dextrose, acetaminophen, ondansetron, oxyCODONE, benzonatate, melatonin, ALPRAZolam, aluminum & magnesium hydroxide-simethicone, morphine, dextrose bolus (hypoglycemia) **OR** dextrose bolus (hypoglycemia)    Lab Data:  CBC:   Recent Labs     01/22/22  0400   WBC 7.7   HGB 9.5*   HCT 32.1*   MCV 85.4        BMP:   Recent Labs     01/22/22  0400 01/23/22  0657 01/24/22  0715   * 134* 136   K 5.1 4.9 5.0    103 103   CO2 23 24 23   PHOS  --   --  2.9   BUN 37* 35* 40*   CREATININE 2.2* 2.2* 2.2*     PT/INR: No results for input(s): PROTIME, INR in the last 72 hours.   BNP:    Recent Labs     01/22/22  0400   PROBNP 7,699*     TROPONIN:   Recent Labs     01/22/22  0400 01/22/22  1135 01/23/22  0657   TROPONINT 0.031* 0.034* 0.048*        Impression:  Principal Problem:    Acute on chronic HFrEF (heart failure with reduced ejection fraction) (HCC)  Active Problems:    Type 2 diabetes mellitus with diabetic polyneuropathy ([facility]    Scrotal edema    Hypertensive urgency  Resolved Problems:    * No resolved hospital problems. *       All labs, medications and tests reviewed by myself, continue all other medications of all above medical condition listed as is except for changes mentioned above. Thank you   Please call with questions. Electronically signed by Hang Gonsalves. MICKY Ulloa CNP on 1/24/2022 at 8:56 AM               CARDIOLOGY ATTENDING ADDENDUM    I have seen, spoken to and examined this patient personally, independently of the NP/PAC. I have reviewed the hospital care given to date and reviewed all pertinent labs and imaging. The plan was developed mutually at the time of the visit with the patient,  NP/PAC and myself. I have spoken with patient, nursing staff and provided written and verbal instructions . The above note has been reviewed.       spent substantive amount of time in formulating patient care     HPI:     Patient denies any chest pain  Complains of groin and scrotal swelling      Physical Exam:    General:   Awake, alert  Head:normal  Eye:normal  Chest:   Clear to auscultation  + Basilar crackles   Cardiovascular:  S1S2   Abdomen: soft   Extremities:  ++  edema  Pulses; palpable      MEDICAL DECISION MAKING :      # Elevated troponin    Non-ACS trend Type II MI Demand ischemia  No ischemic work-up planned at this point    #History of coronary disease s/p PCI  Last left heart cath in 2018 with patent stents noted in LAD circumflex and RCA  Continue with Plavix  Continue with Coreg  Continue with Lipitor    # mixed congestive heart failure    History of noncompliance  Continue with IV Lasix 3 times daily  Nephrology following  Echocardiogram today    #   Essential hypertension: Stable   Continue with Coreg Aldactone and lisinopril    #   HLD: Cont statins      #  Acute renal failure management per nephrology         Dr. Dipti Francis MD

## 2022-01-24 NOTE — PROGRESS NOTES
Hospitalist Progress Note      Name:  Flynn Van /Age/Sex: 1950  (70 y.o. male)   MRN & CSN:  [id number] & [id number] Admission Date/Time: 2022  3:40 AM   Location:  [address] [place] [address]-A PCP: Sienna Chan Day: 3    Assessment and Plan:   Janechasity Napier a 70 y.o.  male  who presents with Acute on chronic HFrEF (heart failure with reduced ejection fraction) ([facility]     #Acute on chronic heart failure with reduced EF  -Improving swelling in bilateral feet  -Cardiology consult and nephrology consult  -Appreciate nephrology input: Lasix 80 mg 4 times daily for now  -Appreciate cardiology input: Echocardiogram     #Elevated troponin  -Slightly increasing from 0.034-0.048  -Appreciate cardiology input: Likely demanding elevation, no ACS     #Scrotal lymphedema  -Chronic due to CHF  -Appreciate urology input: No signs of infection, pending scrotum ultrasound    #Penis discharge  -Purulent drainage noted around Fuller catheter  -Not sure reason, GC chlamydia is pending     #CKD stage III  -Creatinine 2.2, on baseline  -Nephrology consult, appreciate recommendation for diuretics treatment.     #Hypertensive urgency (resolved)  -Blood pressure 130s over 66 today   -Continue home meds lisinopril, spironolactone, doxazosin, coreg     #Diabetes mellitus type 2  -Continue insulin sliding scale      Diet ADULT DIET; Regular; 5 carb choices (75 gm/meal);  Low Sodium (2 gm)   DVT Prophylaxis [x] Lovenox, []  Heparin, [] SCDs, [] Ambulation   GI Prophylaxis [] PPI,  [] H2 Blocker,  [] Carafate,  [] Diet/Tube Feeds   Code Status Full Code   Disposition Patient requires continued admission due to medical condition     History of Present Illness:     Chief Complaint: Acute on chronic HFrEF (heart failure with reduced ejection fraction) ([facility]  Flynn Van is a 70 y.o.  male with a history of a type 2 diabetes, hypertension, hyperlipidemia, sick sinus syndrome,, CKD, congestive heart failure presents with worsening shortness of breath and lower extremity edema for few days. Patient was found significant elevated BNP at 7699. Patient was admitted to hospital for IV diuretics. Patient reported bilateral scrotum worsening pain today. Fuller catheter inserted yesterday. The nurse noticed some purulent drainage from penis this morning. Patient has no chills no fever. Culture collected. Started antibiotics. Urology consulted. Patient was also found elevated troponin at 0.048. Cardiology consulted. Ten point ROS reviewed negative, unless as noted above    Objective: Intake/Output Summary (Last 24 hours) at 1/24/2022 1229  Last data filed at 1/24/2022 1017  Gross per 24 hour   Intake --   Output 3200 ml   Net -3200 ml      Vitals:   Vitals:    01/24/22 1032   BP: (!) 163/66   Pulse: 63   Resp: 16   Temp: 98.1 °F (36.7 °C)   SpO2: 96%     Physical Exam:   GEN Awake male, sitting upright in bed in no apparent distress. Appears given age. EYES Pupils are equally round. No scleral erythema, discharge, or conjunctivitis. HENT Mucous membranes are moist. Oral pharynx without exudates, no evidence of thrush. NECK Supple, no apparent thyromegaly or masses. RESP Clear to auscultation, no wheezes, rales or rhonchi. Symmetric chest movement while on room air. CARDIO/VASC S1/S2 auscultated. Regular rate without appreciable murmurs, rubs, or gallops. No JVD or carotid bruits. Peripheral pulses equal bilaterally and palpable. No peripheral edema. GI Abdomen is soft without significant tenderness, masses, or guarding. Bowel sounds are normoactive. Rectal exam deferred.  bilateral scrotum swelling, mildly tender to palpation, still noted purulent drainage from penis around Fuller catheter. HEME/LYMPH No palpable cervical lymphadenopathy and no hepatosplenomegaly. No petechiae or ecchymoses. MSK No gross joint deformities. SKIN Normal coloration, warm, dry.   NEURO Cranial nerves appear grossly intact, normal speech, no lateralizing weakness. PSYCH Awake, alert, oriented x 4. Affect appropriate. Medications:   Medications:    vancomycin (VANCOCIN) intermittent dosing (placeholder)   Other See Admin Instructions    furosemide  80 mg IntraVENous 4x Daily    chlorothiazide (DIURIL) IVPB  500 mg IntraVENous Q12H    atorvastatin  40 mg Oral Nightly    carvedilol  3.125 mg Oral BID    clopidogrel  75 mg Oral Daily    metaxalone  800 mg Oral TID    tiotropium  2 puff Inhalation Daily    sodium chloride flush  5-40 mL IntraVENous 2 times per day    heparin (porcine)  5,000 Units SubCUTAneous 3 times per day    lisinopril  5 mg Oral Daily    spironolactone  25 mg Oral Daily    insulin lispro  0-18 Units SubCUTAneous 4x Daily AC & HS    insulin lispro  0-9 Units SubCUTAneous Nightly      Infusions:    sodium chloride      dextrose       PRN Meds: albuterol sulfate HFA, 2 puff, Q4H PRN  sodium chloride flush, 5-40 mL, PRN  sodium chloride, 25 mL, PRN  ondansetron, 4 mg, Q8H PRN   Or  ondansetron, 4 mg, Q6H PRN  polyethylene glycol, 17 g, Daily PRN  acetaminophen, 650 mg, Q6H PRN   Or  acetaminophen, 650 mg, Q6H PRN  glucose, 15 g, PRN  glucagon (rDNA), 1 mg, PRN  dextrose, 100 mL/hr, PRN  acetaminophen, 650 mg, Q6H PRN  ondansetron, 4 mg, Q6H PRN  oxyCODONE, 5 mg, Q4H PRN  benzonatate, 100 mg, TID PRN  melatonin, 3 mg, Nightly PRN  ALPRAZolam, 0.25 mg, Nightly PRN  aluminum & magnesium hydroxide-simethicone, 30 mL, Q6H PRN  morphine, 2 mg, Q4H PRN  dextrose bolus (hypoglycemia), 125 mL, PRN   Or  dextrose bolus (hypoglycemia), 250 mL, PRN          Patient is still admitted because medical condition.  The anticipated discharge is pending    Electronically signed by Kandy Gilbert CNP on 1/24/2022 at 12:29 PM

## 2022-01-24 NOTE — PROGRESS NOTES
Comprehensive Nutrition Assessment    Type and Reason for Visit:  Initial,Positive Nutrition Screen,Wound,Consult,Patient Education (Heart Failure Consult)    Nutrition Recommendations/Plan:   Continue current diet order   Offer adequate protein snacks/supplements prn   Please document PO intakes I/O   Will monitor skin status, labs, and nutrition status     Nutrition Assessment:  Admitted with acute on chronic HFrEF, T2DM, and Scrotal Edema. Pt currently on carb controlled, low sodium 2 gm restricted diet, reports consuming good. States had good appetite, consuming 3 meals/day with daughters, keeping blood sugars ranging around 126. Pt goes to wound clinic once per day. Noted inconsistent blood sugars dos. Attempt to discuss heart healthy diet, but pt denies need for information. Offered supplements for wound healing, denies at this time. Encourage to consume adequate protein at meals for healing. Follow as moderate nutrition risk. Malnutrition Assessment:  Malnutrition Status:  No malnutrition    Context:  Chronic Illness     Findings of the 6 clinical characteristics of malnutrition:  Energy Intake:  No significant decrease in energy intake  Weight Loss:  No significant weight loss     Body Fat Loss:  No significant body fat loss     Muscle Mass Loss:  No significant muscle mass loss    Fluid Accumulation:  7 - Severe Generalized,Genitals,Extremities   Strength:  Not Performed    Estimated Daily Nutrient Needs:  Energy (kcal):  8880-6421 ([address]); Weight Used for Energy Requirements:  Current     Protein (g):   (1.1-1.4 g/kg IBW);  Weight Used for Protein Requirements:  Ideal        Fluid (ml/day):  fluids per MD; Method Used for Fluid Requirements:  Other (Comment)      Nutrition Related Findings:  No current A1c within last 3 months, no significant wt loss within last 6 months, plans for diuresis,       Wounds:  Multiple,Diabetic Ulcer,Open Wounds       Current Nutrition Therapies:    ADULT DIET; Regular; 5 carb choices (75 gm/meal); Low Sodium (2 gm)    Anthropometric Measures:  · Height: 6' (182.9 cm)  · Current Body Weight: 270 lb 1 oz (122.5 kg)   · Admission Body Weight:  (stated)    · Usual Body Weight: 282 lb 3 oz (128 kg) (6/7/21)     · Ideal Body Weight: 178 lbs; % Ideal Body Weight 151.7 %   · BMI: 36.6  · Adjusted Body Weight:  ; Amputation (Toe amputation, no weight adjustment)   · Adjusted BMI:      · BMI Categories: Obese Class 2 (BMI 35.0 -39.9)       Nutrition Diagnosis:   · Predicted inadequate energy intake related to cardiac dysfunction,increase demand for energy/nutrients as evidenced by localized or generalized fluid accumulation,wounds    Nutrition Interventions:   Food and/or Nutrient Delivery:  Continue Current Diet,Snacks (Comment)  Nutrition Education/Counseling:  Education declined   Coordination of Nutrition Care:  Continue to monitor while inpatient    Goals:  Pt will consume at least 75% of meals with better glucose control       Nutrition Monitoring and Evaluation:   Behavioral-Environmental Outcomes:  None Identified   Food/Nutrient Intake Outcomes:  Diet Advancement/Tolerance,Food and Nutrient Intake  Physical Signs/Symptoms Outcomes:  Biochemical Data,GI Status,Weight,Meal Time Behavior,Skin,Fluid Status or Edema (+3, +4 edema)     Discharge Planning:     Too soon to determine     Electronically signed by Arnulfo Dior RD, LD on 1/24/22 at 10:40 AM EST    Contact: [phone]

## 2022-01-24 NOTE — PROGRESS NOTES
[facility], [address]   Progress Note  [facility] 0 1 2      Date: 2022   Patient: Марина Ugarte   : 1950   DOA: 2022   MRN: [id number]   ROOM#: [id number]/4120-A     Admit Date: 2022     Collaborating Urologist on Call at time of admission: Dr. Karen Leon  CC: \"My balls hurt\"  Reason for Consult:  Scrotal pain and swelling    Subjective:     Pain: mild, no nausea and no vomiting,   Bowel Movement/Flatus:   Yes  Voidinfr parkinson catheter, urine clear yellow    Pt resting in bed, endorses some improvement in his scrotal pain/swelling. Objective:    Vitals:    /61   Pulse 63   Temp 98.2 °F (36.8 °C)   Resp 16   Ht 6' (1.829 m)   Wt 270 lb (122.5 kg)   SpO2 95%   BMI 36.62 kg/m²    No data recorded     Intake/Output Summary (Last 24 hours) at 2022 1207  Last data filed at 2022 0252  Gross per 24 hour   Intake --   Output 1850 ml   Net -1850 ml       Physical Exam:   General appearance: alert, appears stated age, cooperative, no distress and moderately obese  Head: Normocephalic, without obvious abnormality, atraumatic  Back: No CVA tenderness  Abdomen: Soft, non-tender, mildly distended  Male genitalia: Diffuse penile/scrotal swelling with no crepitus, fluctuance, drainage, Aubrie's, or signs of infection noted. 16fr parkinson catheter in place, urine clear yellow.     Labs:   WBC:    Lab Results   Component Value Date    WBC 7.7 2022      Hemoglobin/Hematocrit:    Lab Results   Component Value Date    HGB 9.5 2022    HCT 32.1 2022      BMP:   Lab Results   Component Value Date     2022    K 5.0 2022    K 5.1 01/10/2018     2022    CO2 23 2022    BUN 40 2022    LABALBU 2.8 2022    CREATININE 2.2 2022    CALCIUM 7.9 2022    GFRAA 36 2022    LABGLOM 30 2022      PT/INR:    Lab Results   Component Value Date    PROTIME 13.3 2021    PROTIME 11.2 2011    INR 1.03 08/30/2021      PTT:    Lab Results   Component Value Date    APTT 41.7 08/30/2021     Imaging:  CT ABDOMEN PELVIS WO CONTRAST Additional Contrast? None    Result Date: 1/22/2022  EXAMINATION: CT OF THE ABDOMEN AND PELVIS WITHOUT CONTRAST 1/22/2022 4:00 am TECHNIQUE: CT of the abdomen and pelvis was performed without the administration of intravenous contrast. Multiplanar reformatted images are provided for review. Dose modulation, iterative reconstruction, and/or weight based adjustment of the mA/kV was utilized to reduce the radiation dose to as low as reasonably achievable. COMPARISON: None. HISTORY: ORDERING SYSTEM PROVIDED HISTORY: reports scrotal drainage; concern for signs of infection TECHNOLOGIST PROVIDED HISTORY: Reason for exam:->reports scrotal drainage; concern for signs of infection Additional Contrast?->None Decision Support Exception - unselect if not a suspected or confirmed emergency medical condition->Emergency Medical Condition (MA) Reason for Exam: reports scrotal drainage; concern for signs of infection FINDINGS: Lower Chest: Small right-sided pleural effusion with adjacent atelectasis is noted. Organs: Cholelithiasis is present. The liver, spleen, adrenal glands, pancreas, and kidneys are unremarkable. GI/Bowel: There is no bowel obstruction. Postsurgical changes are seen to the colon. Pelvis: The bladder is unremarkable. There is no free fluid. Peritoneum/Retroperitoneum: There is no free air. Trace fluid is seen within the abdomen. Bones/Soft Tissues: Diffuse body wall edema is noted. There is no evidence of soft tissue gas. No destructive osseous lesions are identified. The scrotum is not completely visualized. 1. Diffuse body wall edema is seen. There is no evidence of soft tissue gas. Scrotum is not completely visualized. 2. Small right pleural effusion with adjacent atelectasis. 3. Cholelithiasis. 4. Trace ascites within the abdomen.      XR CHEST PORTABLE    Result Date: 1/22/2022  EXAMINATION: ONE XRAY VIEW OF THE CHEST 1/22/2022 5:21 am COMPARISON: 01/17/2022 HISTORY: ORDERING SYSTEM PROVIDED HISTORY: reports shortness of breath TECHNOLOGIST PROVIDED HISTORY: Reason for exam:->reports shortness of breath Reason for Exam: reports shortness of breath FINDINGS: The cardiac silhouette is mildly enlarged. There is a left-sided cardiac device. The lungs are clear. No pleural effusion or pneumothorax. The visualized osseous structures are unremarkable. 1. Cardiomegaly, unchanged. XR CHEST PORTABLE    Result Date: 1/17/2022  EXAMINATION: ONE XRAY VIEW OF THE CHEST 1/17/2022 12:16 am COMPARISON: October 28, 2021 HISTORY: ORDERING SYSTEM PROVIDED HISTORY: dyspnea TECHNOLOGIST PROVIDED HISTORY: Reason for exam:->dyspnea Reason for Exam: dyspnea Additional signs and symptoms: dyspnea Relevant Medical/Surgical History: dyspnea FINDINGS: Left chest wall AICD in place. Cardiomediastinal silhouette is stable. The lungs show focal consolidation or pleural effusion. No pulmonary edema. No pneumothorax. No acute osseous abnormality. 1. No acute cardiopulmonary disease. 2. Mild cardiomegaly. Assessment & Plan:      Eddy Riddle is a 70y.o. year old male admitted 1/22/2022 for acute on chronic heart failure, scrotal pain and swelling. 1) Scrotal Lymphedema: No signs of infection on physical exam. Difficult to fully examine perirectal and perineal area. CT scan shows no gas/infection but did not go all the way down thru scrotum. Recommend elevation and ice. Treat underlying lymphedema. Obtain scrotal US. Will follow. Patient seen and examined, chart reviewed.      Electronically signed by Ted Post PA-C on 1/24/2022 at 9:42 AM

## 2022-01-24 NOTE — PLAN OF CARE
Nutrition Problem #1: Predicted inadequate energy intake  Intervention: Food and/or Nutrient Delivery: Continue Current Diet,Snacks (Comment)  Nutritional Goals: Pt will consume at least 75% of meals with better glucose control

## 2022-01-25 LAB
ANION GAP SERPL CALCULATED.3IONS-SCNC: 11 MMOL/L (ref 4–16)
BASOPHILS ABSOLUTE: 0 K/CU MM
BASOPHILS RELATIVE PERCENT: 0.4 % (ref 0–1)
BUN BLDV-MCNC: 45 MG/DL (ref 6–23)
C-REACTIVE PROTEIN, HIGH SENSITIVITY: 115.1 MG/L
CALCIUM SERPL-MCNC: 7.6 MG/DL (ref 8.3–10.6)
CHLORIDE BLD-SCNC: 101 MMOL/L (ref 99–110)
CO2: 24 MMOL/L (ref 21–32)
CREAT SERPL-MCNC: 2.4 MG/DL (ref 0.9–1.3)
DIFFERENTIAL TYPE: ABNORMAL
DOSE AMOUNT: NORMAL
DOSE TIME: NORMAL
EKG ATRIAL RATE: 72 BPM
EKG DIAGNOSIS: NORMAL
EKG P AXIS: 74 DEGREES
EKG P-R INTERVAL: 184 MS
EKG Q-T INTERVAL: 420 MS
EKG QRS DURATION: 94 MS
EKG QTC CALCULATION (BAZETT): 459 MS
EKG R AXIS: 53 DEGREES
EKG T AXIS: 189 DEGREES
EKG VENTRICULAR RATE: 72 BPM
EOSINOPHILS ABSOLUTE: 0.1 K/CU MM
EOSINOPHILS RELATIVE PERCENT: 1.4 % (ref 0–3)
GFR AFRICAN AMERICAN: 32 ML/MIN/1.73M2
GFR NON-AFRICAN AMERICAN: 27 ML/MIN/1.73M2
GLUCOSE BLD-MCNC: 179 MG/DL (ref 70–99)
GLUCOSE BLD-MCNC: 185 MG/DL (ref 70–99)
GLUCOSE BLD-MCNC: 225 MG/DL (ref 70–99)
GLUCOSE BLD-MCNC: 85 MG/DL (ref 70–99)
HCT VFR BLD CALC: 27.3 % (ref 42–52)
HEMOGLOBIN: 8.3 GM/DL (ref 13.5–18)
IMMATURE NEUTROPHIL %: 0.3 % (ref 0–0.43)
LYMPHOCYTES ABSOLUTE: 0.9 K/CU MM
LYMPHOCYTES RELATIVE PERCENT: 9.5 % (ref 24–44)
MAGNESIUM: 1.9 MG/DL (ref 1.8–2.4)
MCH RBC QN AUTO: 25.9 PG (ref 27–31)
MCHC RBC AUTO-ENTMCNC: 30.4 % (ref 32–36)
MCV RBC AUTO: 85 FL (ref 78–100)
MONOCYTES ABSOLUTE: 0.6 K/CU MM
MONOCYTES RELATIVE PERCENT: 6.5 % (ref 0–4)
NUCLEATED RBC %: 0 %
PDW BLD-RTO: 19.7 % (ref 11.7–14.9)
PLATELET # BLD: 204 K/CU MM (ref 140–440)
PMV BLD AUTO: 9.8 FL (ref 7.5–11.1)
POTASSIUM SERPL-SCNC: 4.8 MMOL/L (ref 3.5–5.1)
RBC # BLD: 3.21 M/CU MM (ref 4.6–6.2)
SARS-COV-2, NAAT: NOT DETECTED
SEGMENTED NEUTROPHILS ABSOLUTE COUNT: 7.5 K/CU MM
SEGMENTED NEUTROPHILS RELATIVE PERCENT: 81.9 % (ref 36–66)
SODIUM BLD-SCNC: 136 MMOL/L (ref 135–145)
SOURCE: NORMAL
TOTAL IMMATURE NEUTOROPHIL: 0.03 K/CU MM
TOTAL NUCLEATED RBC: 0 K/CU MM
VANCOMYCIN RANDOM: 17.7 UG/ML
WBC # BLD: 9.1 K/CU MM (ref 4–10.5)

## 2022-01-25 PROCEDURE — 80202 ASSAY OF VANCOMYCIN: CPT

## 2022-01-25 PROCEDURE — 99233 SBSQ HOSP IP/OBS HIGH 50: CPT | Performed by: INTERNAL MEDICINE

## 2022-01-25 PROCEDURE — 2700000000 HC OXYGEN THERAPY PER DAY

## 2022-01-25 PROCEDURE — 6370000000 HC RX 637 (ALT 250 FOR IP): Performed by: PHYSICIAN ASSISTANT

## 2022-01-25 PROCEDURE — 6370000000 HC RX 637 (ALT 250 FOR IP): Performed by: INTERNAL MEDICINE

## 2022-01-25 PROCEDURE — 6360000002 HC RX W HCPCS: Performed by: INTERNAL MEDICINE

## 2022-01-25 PROCEDURE — 6360000002 HC RX W HCPCS: Performed by: NURSE PRACTITIONER

## 2022-01-25 PROCEDURE — 87635 SARS-COV-2 COVID-19 AMP PRB: CPT

## 2022-01-25 PROCEDURE — 83735 ASSAY OF MAGNESIUM: CPT

## 2022-01-25 PROCEDURE — 93010 ELECTROCARDIOGRAM REPORT: CPT | Performed by: INTERNAL MEDICINE

## 2022-01-25 PROCEDURE — 2580000003 HC RX 258: Performed by: PHYSICIAN ASSISTANT

## 2022-01-25 PROCEDURE — 80048 BASIC METABOLIC PNL TOTAL CA: CPT

## 2022-01-25 PROCEDURE — 85025 COMPLETE CBC W/AUTO DIFF WBC: CPT

## 2022-01-25 PROCEDURE — 6360000002 HC RX W HCPCS: Performed by: PHYSICIAN ASSISTANT

## 2022-01-25 PROCEDURE — 94761 N-INVAS EAR/PLS OXIMETRY MLT: CPT

## 2022-01-25 PROCEDURE — 2580000003 HC RX 258: Performed by: NURSE PRACTITIONER

## 2022-01-25 PROCEDURE — 82962 GLUCOSE BLOOD TEST: CPT

## 2022-01-25 PROCEDURE — 1200000000 HC SEMI PRIVATE

## 2022-01-25 PROCEDURE — 2580000003 HC RX 258: Performed by: INTERNAL MEDICINE

## 2022-01-25 PROCEDURE — 36415 COLL VENOUS BLD VENIPUNCTURE: CPT

## 2022-01-25 RX ORDER — MORPHINE SULFATE 2 MG/ML
2 INJECTION, SOLUTION INTRAMUSCULAR; INTRAVENOUS ONCE
Status: DISCONTINUED | OUTPATIENT
Start: 2022-01-25 | End: 2022-02-02 | Stop reason: HOSPADM

## 2022-01-25 RX ORDER — OXYCODONE HYDROCHLORIDE 5 MG/1
7.5 TABLET ORAL EVERY 4 HOURS PRN
Status: DISCONTINUED | OUTPATIENT
Start: 2022-01-25 | End: 2022-02-02 | Stop reason: HOSPADM

## 2022-01-25 RX ADMIN — SODIUM CHLORIDE, PRESERVATIVE FREE 10 ML: 5 INJECTION INTRAVENOUS at 09:40

## 2022-01-25 RX ADMIN — MORPHINE SULFATE 2 MG: 2 INJECTION, SOLUTION INTRAMUSCULAR; INTRAVENOUS at 03:41

## 2022-01-25 RX ADMIN — MORPHINE SULFATE 2 MG: 2 INJECTION, SOLUTION INTRAMUSCULAR; INTRAVENOUS at 22:55

## 2022-01-25 RX ADMIN — CHLOROTHIAZIDE SODIUM 500 MG: 500 INJECTION, POWDER, LYOPHILIZED, FOR SOLUTION INTRAVENOUS at 03:39

## 2022-01-25 RX ADMIN — SODIUM CHLORIDE 25 ML: 9 INJECTION, SOLUTION INTRAVENOUS at 16:40

## 2022-01-25 RX ADMIN — LISINOPRIL 5 MG: 5 TABLET ORAL at 09:38

## 2022-01-25 RX ADMIN — FUROSEMIDE 80 MG: 10 INJECTION, SOLUTION INTRAMUSCULAR; INTRAVENOUS at 18:29

## 2022-01-25 RX ADMIN — MORPHINE SULFATE 2 MG: 2 INJECTION, SOLUTION INTRAMUSCULAR; INTRAVENOUS at 14:16

## 2022-01-25 RX ADMIN — MORPHINE SULFATE 2 MG: 2 INJECTION, SOLUTION INTRAMUSCULAR; INTRAVENOUS at 18:30

## 2022-01-25 RX ADMIN — CARVEDILOL 3.12 MG: 6.25 TABLET, FILM COATED ORAL at 09:38

## 2022-01-25 RX ADMIN — OXYCODONE HYDROCHLORIDE 7.5 MG: 5 TABLET ORAL at 13:20

## 2022-01-25 RX ADMIN — OXYCODONE HYDROCHLORIDE 5 MG: 5 TABLET ORAL at 09:38

## 2022-01-25 RX ADMIN — SPIRONOLACTONE 25 MG: 25 TABLET ORAL at 09:38

## 2022-01-25 RX ADMIN — FUROSEMIDE 80 MG: 10 INJECTION, SOLUTION INTRAMUSCULAR; INTRAVENOUS at 09:20

## 2022-01-25 RX ADMIN — CHLOROTHIAZIDE SODIUM 500 MG: 500 INJECTION, POWDER, LYOPHILIZED, FOR SOLUTION INTRAVENOUS at 16:41

## 2022-01-25 RX ADMIN — SODIUM CHLORIDE, PRESERVATIVE FREE 10 ML: 5 INJECTION INTRAVENOUS at 22:57

## 2022-01-25 RX ADMIN — ATORVASTATIN CALCIUM 40 MG: 40 TABLET, FILM COATED ORAL at 22:10

## 2022-01-25 RX ADMIN — HEPARIN SODIUM 5000 UNITS: 5000 INJECTION INTRAVENOUS; SUBCUTANEOUS at 13:20

## 2022-01-25 RX ADMIN — VANCOMYCIN HYDROCHLORIDE 1250 MG: 5 INJECTION, POWDER, LYOPHILIZED, FOR SOLUTION INTRAVENOUS at 22:59

## 2022-01-25 RX ADMIN — SODIUM CHLORIDE, PRESERVATIVE FREE 10 ML: 5 INJECTION INTRAVENOUS at 22:11

## 2022-01-25 RX ADMIN — HEPARIN SODIUM 5000 UNITS: 5000 INJECTION INTRAVENOUS; SUBCUTANEOUS at 22:10

## 2022-01-25 RX ADMIN — HEPARIN SODIUM 5000 UNITS: 5000 INJECTION INTRAVENOUS; SUBCUTANEOUS at 05:22

## 2022-01-25 RX ADMIN — SODIUM CHLORIDE 25 ML: 9 INJECTION, SOLUTION INTRAVENOUS at 22:58

## 2022-01-25 RX ADMIN — CARVEDILOL 3.12 MG: 6.25 TABLET, FILM COATED ORAL at 22:10

## 2022-01-25 RX ADMIN — CLOPIDOGREL 75 MG: 75 TABLET, FILM COATED ORAL at 09:38

## 2022-01-25 RX ADMIN — FUROSEMIDE 80 MG: 10 INJECTION, SOLUTION INTRAMUSCULAR; INTRAVENOUS at 14:16

## 2022-01-25 ASSESSMENT — PAIN SCALES - GENERAL
PAINLEVEL_OUTOF10: 8
PAINLEVEL_OUTOF10: 9
PAINLEVEL_OUTOF10: 8
PAINLEVEL_OUTOF10: 6
PAINLEVEL_OUTOF10: 10
PAINLEVEL_OUTOF10: 4
PAINLEVEL_OUTOF10: 0
PAINLEVEL_OUTOF10: 9
PAINLEVEL_OUTOF10: 9
PAINLEVEL_OUTOF10: 7
PAINLEVEL_OUTOF10: 9
PAINLEVEL_OUTOF10: 7

## 2022-01-25 ASSESSMENT — PAIN DESCRIPTION - ORIENTATION: ORIENTATION: RIGHT

## 2022-01-25 ASSESSMENT — PAIN DESCRIPTION - PAIN TYPE
TYPE: ACUTE PAIN
TYPE: ACUTE PAIN

## 2022-01-25 ASSESSMENT — PAIN DESCRIPTION - FREQUENCY
FREQUENCY: CONTINUOUS
FREQUENCY: CONTINUOUS

## 2022-01-25 ASSESSMENT — PAIN DESCRIPTION - DESCRIPTORS
DESCRIPTORS: ACHING
DESCRIPTORS: PRESSURE

## 2022-01-25 ASSESSMENT — ENCOUNTER SYMPTOMS: SHORTNESS OF BREATH: 0

## 2022-01-25 ASSESSMENT — PAIN DESCRIPTION - LOCATION
LOCATION: GENERALIZED
LOCATION: SCROTUM

## 2022-01-25 ASSESSMENT — PAIN DESCRIPTION - ONSET: ONSET: ON-GOING

## 2022-01-25 NOTE — PROGRESS NOTES
[facility]   Progress Note  [facility] 0 1 2      Date: 2022   Patient: Carmelo Adams   : 1950   DOA: 2022   MRN: [id number]   ROOM#: 4120/4120-A     Admit Date: 2022     Collaborating Urologist on Call at time of admission: Dr. Carmen Kiser  CC: \"My balls hurt\"  Reason for Consult:  Scrotal pain and swelling    Subjective:     Pain: mild, no nausea and no vomiting,   Bowel Movement/Flatus:   Yes  Voidinfr parkinson catheter, urine clear yellow     Pt resting in bed, endorses some improvement in his scrotal pain/swelling since yesterday. Objective:    Vitals:    BP (!) 104/57   Pulse 60   Temp 98.6 °F (37 °C) (Oral)   Resp 20   Ht 6' (1.829 m)   Wt 294 lb 15.6 oz (133.8 kg)   SpO2 91%   BMI 40.01 kg/m²    Temp  Av.1 °F (36.7 °C)  Min: 97.5 °F (36.4 °C)  Max: 98.6 °F (37 °C)     Intake/Output Summary (Last 24 hours) at 2022 0834  Last data filed at 2022 0532  Gross per 24 hour   Intake --   Output 5000 ml   Net -5000 ml       Physical Exam:   General appearance: alert, appears stated age, cooperative, no distress and moderately obese  Head: Normocephalic, without obvious abnormality, atraumatic  Back: No CVA tenderness  Abdomen: Soft, non-tender, mildly distended  Male genitalia: Diffuse penile/scrotal swelling with no crepitus, fluctuance, drainage, Aubrie's, or signs of infection noted. 16fr parkinson catheter in place, urine clear yellow.     Labs:   WBC:    Lab Results   Component Value Date    WBC 9.1 2022      Hemoglobin/Hematocrit:    Lab Results   Component Value Date    HGB 8.3 2022    HCT 27.3 2022      BMP:   Lab Results   Component Value Date     2022    K 4.8 2022    K 5.1 01/10/2018     2022    CO2 24 2022    BUN 45 2022    LABALBU 2.8 2022    CREATININE 2.4 2022    CALCIUM 7.6 2022    GFRAA 32 2022    LABGLOM 27 2022      PT/INR:    Lab Results   Component Value Date    PROTIME 13.3 08/30/2021    PROTIME 11.2 09/08/2011    INR 1.03 08/30/2021      PTT:    Lab Results   Component Value Date    APTT 41.7 08/30/2021     Imaging:  Echocardiogram complete 2D with doppler with color    Result Date: 1/24/2022  Transthoracic Echocardiography Report (TTE)  Demographics   Patient Name        Ashley Gong Date of Study          01/24/2022   Date of Birth       1950   Gender                 Male   Age                 70 year(s)   Race                   Black   Patient Number      [id number]   Room Number            4120   Visit Number        [id number]   Corporate ID        [id number]   Accession Number    [id number]   [facility]   Ordering Physician               Interpreting Physician Irish Hale MD  Procedure Type of Study   TTE procedure:ECHOCARDIOGRAM COMPLETE 2D W DOPPLER W COLOR. Procedure Date Date: 01/24/2022 Start: 08:03 AM Study Location: Portable Technical Quality: Fair visualization Indications:Congestive heart failure. Patient Status: Routine Height: 72 inches Weight: 270 pounds BSA: 2.42 m2 BMI: 36.62 kg/m2 HR: 63 bpm BP: 126/61 mmHg  Conclusions   Summary  Left ventricular systolic function is normal.  Ejection fraction is visually estimated at 50%. Moderate left ventricular hypertrophy. Septal flattening due to RVVO and pressure overload. PPM wiring visualized in right heart. Mildly dilated right atrium. Severely dilated right ventricle cavity. Severe tricuspid regurgitation; RVSP: 75 mmHg. No evidence of any pericardial effusion. Right pleural effusion. Dilated aortic root (4.0 cm). Inferior vena cava is dilated, measuring at 2.3 cm, and does not collapse  with respiration.    Signature   ------------------------------------------------------------------  Electronically signed by Irish Hale MD (Interpreting  physician) on 01/24/2022 at 03:32 PM ------------------------------------------------------------------   Findings   Left Ventricle  Left ventricular systolic function is normal.  Ejection fraction is visually estimated at 50%. Left ventricle size is normal.  Moderate left ventricular hypertrophy. Grade I diastolic dysfunction. Septal flattening due to RVVO and pressure overload. Left Atrium  Essentially normal left atrium. Right Atrium  PPM wiring visualized in right atrium. Mildly dilated right atrium. Right Ventricle  Pacer Wire visualized in right ventricle. Severely dilated right ventricle cavity. Aortic Valve  Sclerotic, but non-stenotic aortic valve. Mitral Valve  Trace mitral regurgitation. Tricuspid Valve  Severe tricuspid regurgitation; RVSP: 75 mmHg. Pulmonic Valve  The pulmonic valve was not well visualized. Pericardial Effusion  No evidence of any pericardial effusion. Pleural Effusion  Right pleural effusion. Miscellaneous  Dilated aortic root (4.0 cm). Inferior vena cava is dilated, measuring at 2.3 cm, and does not collapse  with respiration. The aorta is within normal limits.   M-Mode/2D Measurements & Calculations   LV Diastolic Dimension:  LV Systolic Dimension:  LA Dimension: 3.5 cmAO Root  4.92 cm                  2.96 cm                 Dimension: 4 cmLA Area:  LV FS:39.8 %             LV Volume Diastolic:    56.3 cm2  LV PW Diastolic: 9.77 cm 351 ml  LV PW Systolic: 2.3 cm   LV Volume Systolic: 50  Septum Diastolic: 7.11   ml  cm                       LV EDV/LV EDV Index:    RV Diastolic Dimension:  Septum Systolic: 2.4 cm  251 MV/06 m2LV ESV/LV   4.33 cm  CO: 3.03 l/min           ESV Index: 50 ml/21 m2  CI: 1.25 l/m*m2          EF Calculated (A4C):    LA/Aorta: 0.88                           54.1 %  LV Area Diastolic: 93.7  EF Calculated (2D):     LA volume/Index: 52 ml  cm2                      70.3 %                  /88C5  LV Area Systolic: 66.0  cm2                      LV Length: 8.66 cm LVOT: 2 cm  Doppler Measurements & Calculations   MV Peak E-Wave: 86.4 AV Peak Velocity: 103 cm/s    LVOT Peak Velocity: 79.9  cm/s                 AV Peak Gradient: 4.24 mmHg   cm/s  MV Peak A-Wave: 66.5 AV Mean Velocity: 74.7 cm/s   LVOT Mean Velocity: 54.4  cm/s                 AV Mean Gradient: 2 mmHg      cm/s  MV E/A Ratio: 1.3    AV VTI: 20.8 cm               LVOT Peak Gradient: 3  MV Peak Gradient:    AV Area (Continuity):2.31 cm2 mmHgLVOT Mean Gradient: 1  2.99 mmHg                                          mmHg                       LVOT VTI: 15.3 cm             Estimated RVSP: 75 mmHg  MV P1/2t: 60 msec                                  Estimated RAP:3 mmHg  MVA by PHT:3.67 cm2  Estimated PASP: 75.25 mmHg   MV E' Septal                                       TR Velocity:425 cm/s  Velocity: 6.44 cm/s                                TR Gradient:72.25 mmHg  MV E' Lateral  Velocity: 6.76 cm/s  MV E/E' septal:  13.42  MV E/E' lateral:  12.78      CT ABDOMEN PELVIS WO CONTRAST Additional Contrast? None    Result Date: 1/22/2022  EXAMINATION: CT OF THE ABDOMEN AND PELVIS WITHOUT CONTRAST 1/22/2022 4:00 am TECHNIQUE: CT of the abdomen and pelvis was performed without the administration of intravenous contrast. Multiplanar reformatted images are provided for review. Dose modulation, iterative reconstruction, and/or weight based adjustment of the mA/kV was utilized to reduce the radiation dose to as low as reasonably achievable. COMPARISON: None.  HISTORY: ORDERING SYSTEM PROVIDED HISTORY: reports scrotal drainage; concern for signs of infection TECHNOLOGIST PROVIDED HISTORY: Reason for exam:->reports scrotal drainage; concern for signs of infection Additional Contrast?->None Decision Support Exception - unselect if not a suspected or confirmed emergency medical condition->Emergency Medical Condition (MA) Reason for Exam: reports scrotal drainage; concern for signs of infection FINDINGS: Lower Chest: Small right-sided pleural effusion with adjacent atelectasis is noted. Organs: Cholelithiasis is present. The liver, spleen, adrenal glands, pancreas, and kidneys are unremarkable. GI/Bowel: There is no bowel obstruction. Postsurgical changes are seen to the colon. Pelvis: The bladder is unremarkable. There is no free fluid. Peritoneum/Retroperitoneum: There is no free air. Trace fluid is seen within the abdomen. Bones/Soft Tissues: Diffuse body wall edema is noted. There is no evidence of soft tissue gas. No destructive osseous lesions are identified. The scrotum is not completely visualized. 1. Diffuse body wall edema is seen. There is no evidence of soft tissue gas. Scrotum is not completely visualized. 2. Small right pleural effusion with adjacent atelectasis. 3. Cholelithiasis. 4. Trace ascites within the abdomen. US SCROTUM AND TESTICLES    Result Date: 1/24/2022  EXAMINATION: ULTRASOUND OF THE SCROTUM/TESTICLES WITH COLOR DOPPLER FLOW EVALUATION; DOPPLER EVALUATION OF THE PELVIS 1/24/2022 12:47 pm; 1/24/2022 12:48 pm TECHNIQUE: Duplex ultrasound using B-mode/gray scaled imaging, Doppler spectral analysis and color flow Doppler was obtained of the testicles.; Duplex ultrasound using B-mode/gray scaled imaging and Doppler spectral analysis and color flow was obtained of the pelvis. COMPARISON: Abdomen and pelvis CT 01/22/2022 HISTORY: ORDERING SYSTEM PROVIDED HISTORY: swelling pus foul discharge from penis TECHNOLOGIST PROVIDED HISTORY: Reason for exam:->swelling pus foul discharge from penis FINDINGS: Right testis:  4.0 cm x 2.6 cm x 3.2 cm Left testis:  3.8 cm x 2.6 cm x 2.8 cm RIGHT GRAYSCALE:  Normal size, echogenicity, and echotexture. No masses nor microlithiasis. DOPPLER:  Normal degree of vascularity. Normal arterial and venous waveforms. SCROTAL SAC:  No hydrocele nor evident varicocele.   Diffuse thickening of the overlying soft tissues including the skin with interstitial fluid but no loculated fluid nor dirty shadowing suggestive of gas. EPIDIDYMIS:  Normal size and degree of vascularity. LEFT GRAYSCALE:  Normal size, echogenicity, and echotexture. No masses nor microlithiasis. DOPPLER:  Normal degree of vascularity. Normal arterial and venous waveforms. SCROTAL SAC:  Trace likely physiologic fluid with no overt hydrocele. No evident varicocele. Diffuse thickening of the overlying soft tissues including the skin with interstitial fluid with no loculated fluid nor dirty shadowing suggestive of gas. EPIDIDYMIS:  Normal size and degree of vascularity. 1. Extensive scrotal soft tissue swelling either due to edema or cellulitis. No visualized findings of abscess or necrotizing fasciitis (Aubrie gangrene). 2. Normal sonographic appearance of testes and epididymides without torsion or epididymitis. XR CHEST PORTABLE    Result Date: 1/22/2022  EXAMINATION: ONE XRAY VIEW OF THE CHEST 1/22/2022 5:21 am COMPARISON: 01/17/2022 HISTORY: ORDERING SYSTEM PROVIDED HISTORY: reports shortness of breath TECHNOLOGIST PROVIDED HISTORY: Reason for exam:->reports shortness of breath Reason for Exam: reports shortness of breath FINDINGS: The cardiac silhouette is mildly enlarged. There is a left-sided cardiac device. The lungs are clear. No pleural effusion or pneumothorax. The visualized osseous structures are unremarkable. 1. Cardiomegaly, unchanged. XR CHEST PORTABLE    Result Date: 1/17/2022  EXAMINATION: ONE XRAY VIEW OF THE CHEST 1/17/2022 12:16 am COMPARISON: October 28, 2021 HISTORY: ORDERING SYSTEM PROVIDED HISTORY: dyspnea TECHNOLOGIST PROVIDED HISTORY: Reason for exam:->dyspnea Reason for Exam: dyspnea Additional signs and symptoms: dyspnea Relevant Medical/Surgical History: dyspnea FINDINGS: Left chest wall AICD in place. Cardiomediastinal silhouette is stable. The lungs show focal consolidation or pleural effusion. No pulmonary edema. No pneumothorax.  No acute osseous abnormality. 1. No acute cardiopulmonary disease. 2. Mild cardiomegaly. US DUP ABD PEL RETRO SCROT COMPLETE    Result Date: 1/24/2022  EXAMINATION: ULTRASOUND OF THE SCROTUM/TESTICLES WITH COLOR DOPPLER FLOW EVALUATION; DOPPLER EVALUATION OF THE PELVIS 1/24/2022 12:47 pm; 1/24/2022 12:48 pm TECHNIQUE: Duplex ultrasound using B-mode/gray scaled imaging, Doppler spectral analysis and color flow Doppler was obtained of the testicles.; Duplex ultrasound using B-mode/gray scaled imaging and Doppler spectral analysis and color flow was obtained of the pelvis. COMPARISON: Abdomen and pelvis CT 01/22/2022 HISTORY: ORDERING SYSTEM PROVIDED HISTORY: swelling pus foul discharge from penis TECHNOLOGIST PROVIDED HISTORY: Reason for exam:->swelling pus foul discharge from penis FINDINGS: Right testis:  4.0 cm x 2.6 cm x 3.2 cm Left testis:  3.8 cm x 2.6 cm x 2.8 cm RIGHT GRAYSCALE:  Normal size, echogenicity, and echotexture. No masses nor microlithiasis. DOPPLER:  Normal degree of vascularity. Normal arterial and venous waveforms. SCROTAL SAC:  No hydrocele nor evident varicocele. Diffuse thickening of the overlying soft tissues including the skin with interstitial fluid but no loculated fluid nor dirty shadowing suggestive of gas. EPIDIDYMIS:  Normal size and degree of vascularity. LEFT GRAYSCALE:  Normal size, echogenicity, and echotexture. No masses nor microlithiasis. DOPPLER:  Normal degree of vascularity. Normal arterial and venous waveforms. SCROTAL SAC:  Trace likely physiologic fluid with no overt hydrocele. No evident varicocele. Diffuse thickening of the overlying soft tissues including the skin with interstitial fluid with no loculated fluid nor dirty shadowing suggestive of gas. EPIDIDYMIS:  Normal size and degree of vascularity. 1. Extensive scrotal soft tissue swelling either due to edema or cellulitis. No visualized findings of abscess or necrotizing fasciitis (Aubrie gangrene).  2. Normal sonographic appearance of testes and epididymides without torsion or epididymitis. Assessment & Plan:      Arthur Wakefield is a 70y.o. year old male admitted 1/22/2022 for acute on chronic heart failure, scrotal pain and swelling.    1) Scrotal Lymphedema: No signs of infection on physical exam. Difficult to fully examine perirectal and perineal area. CT scan shows no gas/infection but did not go all the way down thru scrotum. Scrotal US 1/24/22: Extensive scrotal soft tissue swelling either due to edema or cellulitis. No visualized findings of abscess or necrotizing fasciitis (Aubrie gangrene). Normal sonographic appearance of testes and epididymides without torsion or epididymitis. Recommend elevation and ice. Treat underlying lymphedema. Nephrology on board. Will follow. Patient seen and examined, chart reviewed.      Electronically signed by Ghassan Fuller PA-C on 1/25/2022 at 8:34 AM

## 2022-01-25 NOTE — PROGRESS NOTES
Nephrology Progress Note  1/25/2022 2:51 PM        Subjective:   Admit Date: 1/22/2022  PCP: Johann Egan    Interval History: Patient seen early morning, this is a late entry    Diet: Reasonable    ROS: Less edema no overt shortness of breath  Pain also well controlled  Urine output recorded about 5 L for the last 24 hours  No fever      Data:     Current meds:    insulin lispro  0-18 Units SubCUTAneous TID WC    vancomycin  1,250 mg IntraVENous Once    vancomycin (VANCOCIN) intermittent dosing (placeholder)   Other See Admin Instructions    furosemide  80 mg IntraVENous 4x Daily    chlorothiazide (DIURIL) IVPB  500 mg IntraVENous Q12H    atorvastatin  40 mg Oral Nightly    carvedilol  3.125 mg Oral BID    clopidogrel  75 mg Oral Daily    metaxalone  800 mg Oral TID    tiotropium  2 puff Inhalation Daily    sodium chloride flush  5-40 mL IntraVENous 2 times per day    heparin (porcine)  5,000 Units SubCUTAneous 3 times per day    lisinopril  5 mg Oral Daily    spironolactone  25 mg Oral Daily    insulin lispro  0-9 Units SubCUTAneous Nightly      sodium chloride      dextrose           I/O last 3 completed shifts:  In: -   Out: 6850 [Urine:6850]    CBC:   Recent Labs     01/24/22  0715 01/25/22  0605   WBC 11.8* 9.1   HGB 8.8* 8.3*    204          Recent Labs     01/23/22  0657 01/24/22  0715 01/25/22  0605   * 136 136   K 4.9 5.0 4.8    103 101   CO2 24 23 24   BUN 35* 40* 45*   CREATININE 2.2* 2.2* 2.4*   GLUCOSE 56* 103* 85       Lab Results   Component Value Date    CALCIUM 7.6 (L) 01/25/2022    PHOS 2.9 01/24/2022       Objective:     Vitals: BP (!) 140/55   Pulse 59   Temp 98.7 °F (37.1 °C) (Oral)   Resp 16   Ht 6' (1.829 m)   Wt 294 lb 15.6 oz (133.8 kg)   SpO2 91%   BMI 40.01 kg/m²     General appearance: Alert, awake and oriented  HEENT: Plus conjunctival pallor  Neck: Supple  Lungs: Few rhonchi no gross crackles.  left chest wall cardiac device which is nontender  Heart: Regular rate and rhythm  Abdomen: Soft, less abdominal wall edema  Extremities: Lower extremity edema as well as scrotal pain and edema little better  He does have a Fuller      Problem List :         Impression :     1. CKD stage IV A1-good urine output-creatinine will fluctuate and may go up with diuretics  2. Severe fluid overload mainly right heart failure pattern  3. Underlying cardiomyopathy, hypertension atherosclerotic cardiovascular disease  4. Chronic leg wound and stasis dermatitis-May have skin and soft tissue infection    Recommendation/Plan  :     1. I would continue diuretics-goal is 4 to 5 L of urine per day  2. Watch for blood pressure-as well as adequate \"capillary plasma refill\"  3. Watch the electrolytes  4. Okay to continue ACE inhibitors despite little high creatinine  5. Also watch Vanco mycin level closely  6. Watch for iatrogenic and nosocomial complication  7.  Follow clinically and biochemically      Mukund Early MD MD
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
## 2022-01-25 NOTE — PROGRESS NOTES
Hospitalist Progress Note      Name:  Frida Werner /Age/Sex: 1950  (70 y.o. male)   MRN & CSN:  [id number] & [id number] Admission Date/Time: 2022  3:40 AM   Location:  [address] [place] [address] PCP: Dionne Reynolds Day: 4    Assessment and Plan:   Sheeba alvarado 70 y.o.  male  who presents with Acute on chronic HFrEF (heart failure with reduced ejection fraction) ([facility] [address]     Acute on chronic CHF primarily right-sided   elevated troponin  Scrotal lymphedema  Reported purulent penile discharge with concerns of infection  CKD stage IV  Uncontrolled hypertension  DM2    Plan  -Continue with aggressive IV diuresis with a goal of 4 to 5 L of urine per day  -Echocardiogram preserved EF, right-sided dysfunction  -Electrolyte monitoring and replacement along with renal function monitoring  -Continue ACE inhibitor and Aldactone per nephrology  -Continue with Vanco for 24 hours, check procalcitonin, low suspicion for infection at this time  -Urology input noted, low suspicion for scrotal cellulitis or infection  -Follow-upon GC and chlamydia  -Monitor BP  -Continue with Plavix, Coreg and Lipitor  -Elevated troponin likely demand ischemia, no ischemic work-up planned at this time      Diet ADULT DIET; Regular; 5 carb choices (75 gm/meal); Low Sodium (2 gm)   DVT Prophylaxis [x] Lovenox, []  Heparin, [] SCDs, [] Ambulation   GI Prophylaxis [] PPI,  [] H2 Blocker,  [] Carafate,  [] Diet/Tube Feeds   Code Status Full Code   Disposition Patient requires continued admission due to medical condition     History of Present Illness:     Chief Complaint: Scrotal pain with swelling and edema     Chart reviewed in detail including hospital course, labs, images, EKG data and specialist input  Patient continues to have significant scrotal pain but somewhat better since yesterday. He has been diuresing with 3.8 L negative, scrotal sinus with some decrease.   Evaluated by urology and no obvious evidence of infection found. All systems reviewed and negative except per HPI    Objective: Intake/Output Summary (Last 24 hours) at 1/25/2022 1817  Last data filed at 1/25/2022 1331  Gross per 24 hour   Intake --   Output 3850 ml   Net -3850 ml      Vitals:   Vitals:    01/25/22 1341   BP: (!) 140/55   Pulse: 59   Resp: 16   Temp: 98.7 °F (37.1 °C)   SpO2: 91%     Physical Exam:   GEN awake and alert, mild distress due to pain  HENT Mucous membranes are moist. Oral pharynx without exudates, no evidence of thrush. NECK Supple, no apparent thyromegaly or masses. RESP unlabored breathing, decreased breath sounds bibasilar   CVS RRR, 1+ bilateral lower extremity pedal edema  GI BS positive, abdomen soft, NT/ND   bilateral scrotum swelling, mildly tender to palpation, no obvious purulent drainage noted today, Fuller catheter in place  MSK No gross joint deformities. SKIN Normal coloration, warm, dry. NEURO Cranial nerves appear grossly intact, normal speech, no lateralizing weakness. PSYCH Awake, alert, oriented x 4. Affect appropriate.     Medications:   Medications:    insulin lispro  0-18 Units SubCUTAneous TID WC    vancomycin  1,250 mg IntraVENous Once    vancomycin (VANCOCIN) intermittent dosing (placeholder)   Other See Admin Instructions    furosemide  80 mg IntraVENous 4x Daily    chlorothiazide (DIURIL) IVPB  500 mg IntraVENous Q12H    atorvastatin  40 mg Oral Nightly    carvedilol  3.125 mg Oral BID    clopidogrel  75 mg Oral Daily    metaxalone  800 mg Oral TID    tiotropium  2 puff Inhalation Daily    sodium chloride flush  5-40 mL IntraVENous 2 times per day    heparin (porcine)  5,000 Units SubCUTAneous 3 times per day    lisinopril  5 mg Oral Daily    spironolactone  25 mg Oral Daily    insulin lispro  0-9 Units SubCUTAneous Nightly      Infusions:    sodium chloride 25 mL (01/25/22 1640)    dextrose       PRN Meds: oxyCODONE, 7.5 mg, Q4H PRN  albuterol sulfate HFA, 2 puff, Q4H

## 2022-01-25 NOTE — PROGRESS NOTES
[address] [facility]  consulted by MICKY Florian for monitoring and adjustment. Indication for treatment: SSTI  Goal trough: 10-15 mcg/mL  AUC/RJ:  <500    Pertinent Laboratory Values:   Temp Readings from Last 3 Encounters:   01/25/22 98.7 °F (37.1 °C) (Oral)   01/16/22 97 °F (36.1 °C) (Oral)   01/06/22 98.3 °F (36.8 °C) (Temporal)     Recent Labs     01/24/22  0715 01/25/22  0605   WBC 11.8* 9.1     Recent Labs     01/23/22  0657 01/24/22  0715 01/25/22  0605   BUN 35* 40* 45*   CREATININE 2.2* 2.2* 2.4*     Estimated Creatinine Clearance: 40 mL/min (A) (based on SCr of 2.4 mg/dL (H)). Intake/Output Summary (Last 24 hours) at 1/25/2022 1400  Last data filed at 1/25/2022 1331  Gross per 24 hour   Intake --   Output 5000 ml   Net -5000 ml       Pertinent Cultures:  Date    Source    Results  1/23   Wound    Ordered    Vancomycin level:   TROUGH:  No results for input(s): VANCOTROUGH in the last 72 hours. RANDOM:    Recent Labs     01/24/22  0715 01/25/22  0605   VANCORANDOM 11.8 17.7       Assessment:  · SCr, BUN, and urine output:   · CKD, Scr trending up to 2.4 (baseline 2.2), UOP good  · Day(s) of therapy: 3  · Vancomycin concentration:  · 1/24 - 11.8, 13 hour level post initial 2000mg ivpb dose  · 1/25 - 17.7, 14 hour level post 1500mg dose  · 1/26 - random 06:00    Plan:  · Intermittent vancomycin dosing with CKD  · Vanco level this am @17.7, ok to re-dose. · Give vancomycin 1250mg ivpb x1 dose today  · Recheck the vanco level tomorrow am  · Pharmacy will continue to monitor patient and adjust therapy as indicated    Brent 3 1/26 @ 0600    Thank you for the consult,  Renzo Vann.  Karol Price, [facility]  1/25/2022 2:00 PM

## 2022-01-26 LAB
ANION GAP SERPL CALCULATED.3IONS-SCNC: 11 MMOL/L (ref 4–16)
BASOPHILS ABSOLUTE: 0 K/CU MM
BASOPHILS RELATIVE PERCENT: 0.2 % (ref 0–1)
BUN BLDV-MCNC: 51 MG/DL (ref 6–23)
CALCIUM SERPL-MCNC: 8 MG/DL (ref 8.3–10.6)
CHLAMYDIA TRACHOMATIS AMPLIFIED DET: NEGATIVE
CHLORIDE BLD-SCNC: 100 MMOL/L (ref 99–110)
CO2: 27 MMOL/L (ref 21–32)
CREAT SERPL-MCNC: 2.7 MG/DL (ref 0.9–1.3)
DIFFERENTIAL TYPE: ABNORMAL
DOSE AMOUNT: NORMAL
DOSE TIME: NORMAL
EOSINOPHILS ABSOLUTE: 0.1 K/CU MM
EOSINOPHILS RELATIVE PERCENT: 1.5 % (ref 0–3)
GFR AFRICAN AMERICAN: 28 ML/MIN/1.73M2
GFR NON-AFRICAN AMERICAN: 23 ML/MIN/1.73M2
GLUCOSE BLD-MCNC: 132 MG/DL (ref 70–99)
GLUCOSE BLD-MCNC: 150 MG/DL (ref 70–99)
GLUCOSE BLD-MCNC: 154 MG/DL (ref 70–99)
GLUCOSE BLD-MCNC: 155 MG/DL (ref 70–99)
GLUCOSE BLD-MCNC: 206 MG/DL (ref 70–99)
GLUCOSE BLD-MCNC: 209 MG/DL (ref 70–99)
HCT VFR BLD CALC: 28.4 % (ref 42–52)
HEMOGLOBIN: 8.3 GM/DL (ref 13.5–18)
IMMATURE NEUTROPHIL %: 0.3 % (ref 0–0.43)
LYMPHOCYTES ABSOLUTE: 0.9 K/CU MM
LYMPHOCYTES RELATIVE PERCENT: 9.5 % (ref 24–44)
MAGNESIUM: 2 MG/DL (ref 1.8–2.4)
MCH RBC QN AUTO: 25.5 PG (ref 27–31)
MCHC RBC AUTO-ENTMCNC: 29.2 % (ref 32–36)
MCV RBC AUTO: 87.1 FL (ref 78–100)
MONOCYTES ABSOLUTE: 0.7 K/CU MM
MONOCYTES RELATIVE PERCENT: 7.4 % (ref 0–4)
N GONORRHOEAE AMPLIFIED DET: NEGATIVE
NUCLEATED RBC %: 0 %
PDW BLD-RTO: 19.8 % (ref 11.7–14.9)
PHOSPHORUS: 3.8 MG/DL (ref 2.5–4.9)
PLATELET # BLD: 222 K/CU MM (ref 140–440)
PMV BLD AUTO: 9.7 FL (ref 7.5–11.1)
POTASSIUM SERPL-SCNC: 5.1 MMOL/L (ref 3.5–5.1)
RBC # BLD: 3.26 M/CU MM (ref 4.6–6.2)
SEGMENTED NEUTROPHILS ABSOLUTE COUNT: 7.2 K/CU MM
SEGMENTED NEUTROPHILS RELATIVE PERCENT: 81.1 % (ref 36–66)
SODIUM BLD-SCNC: 138 MMOL/L (ref 135–145)
TOTAL IMMATURE NEUTOROPHIL: 0.03 K/CU MM
TOTAL NUCLEATED RBC: 0 K/CU MM
VANCOMYCIN RANDOM: 24.2 UG/ML
WBC # BLD: 8.9 K/CU MM (ref 4–10.5)

## 2022-01-26 PROCEDURE — 94761 N-INVAS EAR/PLS OXIMETRY MLT: CPT

## 2022-01-26 PROCEDURE — 6360000002 HC RX W HCPCS: Performed by: INTERNAL MEDICINE

## 2022-01-26 PROCEDURE — 82962 GLUCOSE BLOOD TEST: CPT

## 2022-01-26 PROCEDURE — 2580000003 HC RX 258: Performed by: INTERNAL MEDICINE

## 2022-01-26 PROCEDURE — 83735 ASSAY OF MAGNESIUM: CPT

## 2022-01-26 PROCEDURE — 36415 COLL VENOUS BLD VENIPUNCTURE: CPT

## 2022-01-26 PROCEDURE — 84100 ASSAY OF PHOSPHORUS: CPT

## 2022-01-26 PROCEDURE — 80048 BASIC METABOLIC PNL TOTAL CA: CPT

## 2022-01-26 PROCEDURE — 6370000000 HC RX 637 (ALT 250 FOR IP): Performed by: PHYSICIAN ASSISTANT

## 2022-01-26 PROCEDURE — 85025 COMPLETE CBC W/AUTO DIFF WBC: CPT

## 2022-01-26 PROCEDURE — 99233 SBSQ HOSP IP/OBS HIGH 50: CPT | Performed by: INTERNAL MEDICINE

## 2022-01-26 PROCEDURE — 80202 ASSAY OF VANCOMYCIN: CPT

## 2022-01-26 PROCEDURE — 6360000002 HC RX W HCPCS: Performed by: PHYSICIAN ASSISTANT

## 2022-01-26 PROCEDURE — 94640 AIRWAY INHALATION TREATMENT: CPT

## 2022-01-26 PROCEDURE — 2580000003 HC RX 258: Performed by: PHYSICIAN ASSISTANT

## 2022-01-26 PROCEDURE — APPSS60 APP SPLIT SHARED TIME 46-60 MINUTES: Performed by: NURSE PRACTITIONER

## 2022-01-26 PROCEDURE — 1200000000 HC SEMI PRIVATE

## 2022-01-26 RX ORDER — METAXALONE 800 MG/1
400 TABLET ORAL 3 TIMES DAILY
Status: DISCONTINUED | OUTPATIENT
Start: 2022-01-26 | End: 2022-02-02 | Stop reason: HOSPADM

## 2022-01-26 RX ORDER — METOLAZONE 5 MG/1
5 TABLET ORAL DAILY
Status: DISCONTINUED | OUTPATIENT
Start: 2022-01-27 | End: 2022-01-31

## 2022-01-26 RX ORDER — SPIRONOLACTONE 25 MG/1
12.5 TABLET ORAL DAILY
Status: DISCONTINUED | OUTPATIENT
Start: 2022-01-27 | End: 2022-02-02 | Stop reason: HOSPADM

## 2022-01-26 RX ADMIN — SODIUM CHLORIDE 25 ML: 9 INJECTION, SOLUTION INTRAVENOUS at 12:11

## 2022-01-26 RX ADMIN — FUROSEMIDE 80 MG: 10 INJECTION, SOLUTION INTRAMUSCULAR; INTRAVENOUS at 00:09

## 2022-01-26 RX ADMIN — SODIUM CHLORIDE, PRESERVATIVE FREE 10 ML: 5 INJECTION INTRAVENOUS at 09:36

## 2022-01-26 RX ADMIN — CARVEDILOL 3.12 MG: 6.25 TABLET, FILM COATED ORAL at 22:36

## 2022-01-26 RX ADMIN — MORPHINE SULFATE 2 MG: 2 INJECTION, SOLUTION INTRAMUSCULAR; INTRAVENOUS at 22:36

## 2022-01-26 RX ADMIN — CARVEDILOL 3.12 MG: 6.25 TABLET, FILM COATED ORAL at 09:35

## 2022-01-26 RX ADMIN — ATORVASTATIN CALCIUM 40 MG: 40 TABLET, FILM COATED ORAL at 22:36

## 2022-01-26 RX ADMIN — HEPARIN SODIUM 5000 UNITS: 5000 INJECTION INTRAVENOUS; SUBCUTANEOUS at 22:36

## 2022-01-26 RX ADMIN — FUROSEMIDE 80 MG: 10 INJECTION, SOLUTION INTRAMUSCULAR; INTRAVENOUS at 08:34

## 2022-01-26 RX ADMIN — SODIUM CHLORIDE, PRESERVATIVE FREE 10 ML: 5 INJECTION INTRAVENOUS at 22:48

## 2022-01-26 RX ADMIN — HEPARIN SODIUM 5000 UNITS: 5000 INJECTION INTRAVENOUS; SUBCUTANEOUS at 05:52

## 2022-01-26 RX ADMIN — SODIUM CHLORIDE 25 ML: 9 INJECTION, SOLUTION INTRAVENOUS at 04:34

## 2022-01-26 RX ADMIN — CEFEPIME HYDROCHLORIDE 2000 MG: 2 INJECTION, POWDER, FOR SOLUTION INTRAVENOUS at 12:16

## 2022-01-26 RX ADMIN — LISINOPRIL 5 MG: 5 TABLET ORAL at 09:35

## 2022-01-26 RX ADMIN — CHLOROTHIAZIDE SODIUM 500 MG: 500 INJECTION, POWDER, LYOPHILIZED, FOR SOLUTION INTRAVENOUS at 04:34

## 2022-01-26 RX ADMIN — FUROSEMIDE 80 MG: 10 INJECTION, SOLUTION INTRAMUSCULAR; INTRAVENOUS at 22:36

## 2022-01-26 RX ADMIN — FUROSEMIDE 80 MG: 10 INJECTION, SOLUTION INTRAMUSCULAR; INTRAVENOUS at 18:36

## 2022-01-26 RX ADMIN — SODIUM CHLORIDE, PRESERVATIVE FREE 10 ML: 5 INJECTION INTRAVENOUS at 00:10

## 2022-01-26 RX ADMIN — CLOPIDOGREL 75 MG: 75 TABLET, FILM COATED ORAL at 09:35

## 2022-01-26 RX ADMIN — SPIRONOLACTONE 25 MG: 25 TABLET ORAL at 09:35

## 2022-01-26 RX ADMIN — HEPARIN SODIUM 5000 UNITS: 5000 INJECTION INTRAVENOUS; SUBCUTANEOUS at 14:30

## 2022-01-26 RX ADMIN — TIOTROPIUM BROMIDE INHALATION SPRAY 2 PUFF: 3.12 SPRAY, METERED RESPIRATORY (INHALATION) at 07:40

## 2022-01-26 ASSESSMENT — PAIN DESCRIPTION - PAIN TYPE: TYPE: ACUTE PAIN

## 2022-01-26 ASSESSMENT — PAIN SCALES - GENERAL
PAINLEVEL_OUTOF10: 10
PAINLEVEL_OUTOF10: 10
PAINLEVEL_OUTOF10: 0

## 2022-01-26 ASSESSMENT — PAIN DESCRIPTION - LOCATION: LOCATION: GENERALIZED

## 2022-01-26 ASSESSMENT — ENCOUNTER SYMPTOMS: SHORTNESS OF BREATH: 0

## 2022-01-26 NOTE — PROGRESS NOTES
Hospitalist Progress Note      Name:  Malcolm Berntsein /Age/Sex: 1950  (70 y.o. male)   MRN & CSN:  [id number] & [id number] Admission Date/Time: 2022  3:40 AM   Location:  [facility] PCP: Michell Warner Day: 5    Assessment and Plan:     Nicola Ramirez a 70 y.o.  male with PMH of DM 2, HFrEF/right-sided CHF, HTN, HLD, SSS, s/p PCM and diabetic neuropathy admitted on 2022 with complaints of increasing lower extremity edema associated with orthopnea with findings of acute on chronic HFrEF along with scrotal lymphedema. Hospital course     Acute on chronic biventricular CHF(systolic/diastolic) primarily right-sided   Elevated troponin  Scrotal lymphedema with acute severe pain  Reported purulent penile/scrotal drainage with concerns of cellulitis/infection  CKD stage IV  Uncontrolled hypertension  DM2  Sick sinus syndrome s/p PPM  PAD  Diabetic foot with bilateral wounds  Right-sided toe amputation  Chronic normocytic anemia, hemoglobin stable    Plan  -Continue with aggressive IV diuresis with a goal of 4 to 5 L of urine per day  (Good diuresis), nephrologist managing  -Echocardiogram preserved EF, right-sided dysfunction  -Electrolyte monitoring and replacement along with renal function monitoring  -Potassium 5.1, CR 2.7, nephrology wants to continue ACE inhibitor and Aldactone at low-dose  -On IV Lasix 80 mg 4 times daily per nephrology, Zaroxolyn added. Renally dose all medications. -ESR, CRP elevated, check procalcitonin, wound culture from scrotal area noted for strep agalactia,  Proteus and MRSA. -CTAP and ultrasound scrotum reviewed and urology input noted, no signs of infection on physical exam, difficult to fully examine perirectal and perineal area. Recommending treating underlying lymphedema.  -Scrotal edema somewhat better, continue with current analgesics oxycodone 7.5 mg  And IV morphine. Pain seems to be tolerable.   Keep scrotum elevated along with icing as needed. -Maintain bowel regimen.  -Continue with Vanco and add cefepime for 24 hours, check procalcitonin. -Monitor renal function closely, pharmacy managing Vanco dosing  -Follow-upon GC and chlamydia  -Monitor BP  -Continue with Plavix, Coreg and Lipitor  -Elevated troponin likely demand ischemia, no ischemic work-up planned at this time  -Blood glucose stable, continue with SSI  -Wound consult for diabetic foot wound  -PT/OT evaluation for disposition plans. Try to reach patient's daughter Edgar Del Rio to give updates on 1/26. Could not get hold of her and was not able to leave voicemail either. Diet ADULT DIET; Regular; 5 carb choices (75 gm/meal); Low Sodium (2 gm)   DVT Prophylaxis [] Lovenox, [x]  Heparin, [] SCDs, [] Ambulation   GI Prophylaxis [] PPI,  [] H2 Blocker,  [] Carafate,  [] Diet/Tube Feeds   Code Status Full Code   Disposition Patient requires continued admission due to medical condition     History of Present Illness:     Chief Complaint: Scrotal pain with swelling and edema     No events overnight, scrotal pain and swelling is somewhat better. Diuresing well, 2.9 L negative, creatinine slowly rising to 2.7, potassium 5.1  Afebrile  Per nursing staff scrotal drainage greenish colored. Wound culture cultures reviewed from 1/23  Growing strep agalactiae, Proteus mirabilis and MRSA light growth  No nausea, vomiting  Had one bowel movement yesterday. All systems reviewed and negative except per HPI    Objective: Intake/Output Summary (Last 24 hours) at 1/26/2022 1229  Last data filed at 1/26/2022 0554  Gross per 24 hour   Intake --   Output 3730 ml   Net -3730 ml      Vitals:   Vitals:    01/26/22 0930   BP: 122/60   Pulse: 60   Resp: 18   Temp:    SpO2: 95%     Physical Exam:   GEN awake and alert, mild distress due to pain  HENT Mucous membranes are moist. Oral pharynx without exudates, no evidence of thrush.   NECK Supple, no apparent thyromegaly or masses. RESP unlabored breathing, decreased breath sounds bibasilar   CVS RRR, 1+ bilateral lower extremity pedal edema  GI BS positive, abdomen soft, NT/ND   bilateral scrotum swelling, mildly tender to palpation, no obvious purulent drainage noted today, Fuller catheter in place  MSK No gross joint deformities. SKIN Normal coloration, warm, dry. NEURO no facial asymmetry, moving all extremities. PSYCH Awake, alert, oriented x 4. Affect appropriate.     Medications:   Medications:    cefepime  2,000 mg IntraVENous Q24H    [START ON 1/27/2022] metOLazone  5 mg Oral Daily    [START ON 1/27/2022] spironolactone  12.5 mg Oral Daily    insulin lispro  0-18 Units SubCUTAneous TID WC    morphine  2 mg IntraVENous Once    furosemide  80 mg IntraVENous 4x Daily    atorvastatin  40 mg Oral Nightly    carvedilol  3.125 mg Oral BID    clopidogrel  75 mg Oral Daily    metaxalone  800 mg Oral TID    tiotropium  2 puff Inhalation Daily    sodium chloride flush  5-40 mL IntraVENous 2 times per day    heparin (porcine)  5,000 Units SubCUTAneous 3 times per day    lisinopril  5 mg Oral Daily    insulin lispro  0-9 Units SubCUTAneous Nightly      Infusions:    sodium chloride 25 mL (01/26/22 1211)    dextrose       PRN Meds: oxyCODONE, 7.5 mg, Q4H PRN  albuterol sulfate HFA, 2 puff, Q4H PRN  sodium chloride flush, 5-40 mL, PRN  sodium chloride, 25 mL, PRN  ondansetron, 4 mg, Q8H PRN   Or  ondansetron, 4 mg, Q6H PRN  polyethylene glycol, 17 g, Daily PRN  acetaminophen, 650 mg, Q6H PRN   Or  acetaminophen, 650 mg, Q6H PRN  glucose, 15 g, PRN  glucagon (rDNA), 1 mg, PRN  dextrose, 100 mL/hr, PRN  acetaminophen, 650 mg, Q6H PRN  ondansetron, 4 mg, Q6H PRN  benzonatate, 100 mg, TID PRN  melatonin, 3 mg, Nightly PRN  ALPRAZolam, 0.25 mg, Nightly PRN  aluminum & magnesium hydroxide-simethicone, 30 mL, Q6H PRN  morphine, 2 mg, Q4H PRN  dextrose bolus (hypoglycemia), 125 mL, PRN   Or  dextrose bolus (hypoglycemia), 250 mL, PRN          Patient is still admitted because medical condition. The anticipated discharge is 48 to 72 hours.     Electronically signed by Chapin Cleveland MD on 1/26/2022 at 12:29 PM

## 2022-01-26 NOTE — PROGRESS NOTES
Nephrology Progress Note  1/26/2022 10:47 AM        Subjective:   Admit Date: 1/22/2022  PCP: Susie Zarate    Interval History: Patient seen early morning, this is a late entry    Diet: Eating little bit better    ROS: Edema better and no overt shortness of breath  No PND or orthopnea  Urine output recorded 4.33 L for the last 24 hours  No fever    Data:     Current meds:    cefepime  2,000 mg IntraVENous Q24H    insulin lispro  0-18 Units SubCUTAneous TID WC    morphine  2 mg IntraVENous Once    furosemide  80 mg IntraVENous 4x Daily    chlorothiazide (DIURIL) IVPB  500 mg IntraVENous Q12H    atorvastatin  40 mg Oral Nightly    carvedilol  3.125 mg Oral BID    clopidogrel  75 mg Oral Daily    metaxalone  800 mg Oral TID    tiotropium  2 puff Inhalation Daily    sodium chloride flush  5-40 mL IntraVENous 2 times per day    heparin (porcine)  5,000 Units SubCUTAneous 3 times per day    lisinopril  5 mg Oral Daily    spironolactone  25 mg Oral Daily    insulin lispro  0-9 Units SubCUTAneous Nightly      sodium chloride 25 mL (01/26/22 0434)    dextrose           I/O last 3 completed shifts:  In: -   Out: 6830 [Urine:6830]    CBC:   Recent Labs     01/24/22  0715 01/25/22  0605 01/26/22  0635   WBC 11.8* 9.1 8.9   HGB 8.8* 8.3* 8.3*    204 222          Recent Labs     01/24/22  0715 01/25/22  0605 01/26/22  0635    136 138   K 5.0 4.8 5.1    101 100   CO2 23 24 27   BUN 40* 45* 51*   CREATININE 2.2* 2.4* 2.7*   GLUCOSE 103* 85 150*       Lab Results   Component Value Date    CALCIUM 8.0 (L) 01/26/2022    PHOS 3.8 01/26/2022       Objective:     Vitals: /60   Pulse 60   Temp 98.4 °F (36.9 °C) (Oral)   Resp 18   Ht 6' (1.829 m)   Wt 291 lb 0.1 oz (132 kg)   SpO2 95%   BMI 39.47 kg/m²     General appearance: Alert, awake and oriented  HEENT: Positive conjunctival pallor  Neck: Supple  Lungs: Few adventitial breath sound  Heart: Seems regular rate and rhythm, left chest wall cardiac device  Abdomen: Soft, less abdominal wall edema scrotal, penile and lower extremity were better  Extremities:    He does have stasis dermatitis  Also gangrene of toe      Problem List :         Impression :     1. Acute kidney injury with underlying CKD stage IV A1-acceptable diuresis-permissive hypercreatininemia-for the sake of decongestion and symptom management-resolved from hemodynamic changes and unlikely any structural kidney damage  2. Severe fluid overload with biventricular failure but predominantly right heart failure-with underlying cardiomyopathy status post ICD placement  3. Significant atherosclerotic cardiovascular disease pretty much involving all vascular bed and hypertension  4. Chronic soft tissue and skin infection    Recommendation/Plan  :     1. Keep all the diuretics for now  2. Send a plain UA  3. We will switch to p.o. thiazide now-as IV is little expensive  4. And see how he does with p.o. thiazide  5. Good glycemic control  6. Okay to keep the ACE inhibitors-we will balk proximal tubular sodium absorption-and also block the renin-angiotensin aldosterone activation especially with heavy diuretics  7.  Follow clinically and biochemically      Mi Silver MD MD

## 2022-01-26 NOTE — PROGRESS NOTES
All of patient's wounds were cleaned, changed and documented on. Pressure sore noted to buttocks, preventative cream applied. Weeping from L posterior calf. BLE exceptionally dry and cracked - silicone cream applied. Scrotum swollen with drainage from parkinson catheter site. New nonadherent dressings to wounds on toes, bilaterally. Patient complains of increased pain today, 2 person maximum assists to roll. Patient was crying throughout all of treatment. Refuses ice therapy. PRN IV pain meds on board. Will continue Q2turn. Perfect serve sent to MD regarding pain management.

## 2022-01-26 NOTE — PROGRESS NOTES
[facility] [address]   Progress Note  [facility] 0 1 2      Date: 2022   Patient: Pablo Mata   : 1950   DOA: 2022   MRN: [id number]   ROOM#: 4120/4120-A     Admit Date: 2022     Collaborating Urologist on Call at time of admission: Dr. Tish Evangelista  CC: \"My balls hurt\"  Reason for Consult:  Scrotal pain and swelling    Subjective:     Pain: mild, no nausea and no vomiting,   Bowel Movement/Flatus:   Yes  Voidinfr parkinson catheter, urine clear yellow     Pt resting in bed, endorses some improvement in his scrotal pain/swelling. Objective:    Vitals:    /60   Pulse 60   Temp 98.4 °F (36.9 °C) (Oral)   Resp 18   Ht 6' (1.829 m)   Wt 291 lb 0.1 oz (132 kg)   SpO2 95%   BMI 39.47 kg/m²    Temp  Av.5 °F (36.9 °C)  Min: 98.3 °F (36.8 °C)  Max: 98.7 °F (37.1 °C)       Intake/Output Summary (Last 24 hours) at 2022 1106  Last data filed at 2022 0554  Gross per 24 hour   Intake --   Output 3730 ml   Net -3730 ml       Physical Exam:   General appearance: alert, appears stated age, cooperative, no distress and moderately obese  Head: Normocephalic, without obvious abnormality, atraumatic  Back: No CVA tenderness  Abdomen: Soft, non-tender, mildly distended  Male genitalia: Diffuse penile/scrotal swelling with no crepitus, fluctuance, drainage, Aubrie's, or signs of infection noted. 16fr parkinson catheter in place, urine clear yellow.     Labs:   WBC:    Lab Results   Component Value Date    WBC 8.9 2022      Hemoglobin/Hematocrit:    Lab Results   Component Value Date    HGB 8.3 2022    HCT 28.4 2022      BMP:   Lab Results   Component Value Date     2022    K 5.1 2022    K 5.1 01/10/2018     2022    CO2 27 2022    BUN 51 2022    LABALBU 2.8 2022    CREATININE 2.7 2022    CALCIUM 8.0 2022    GFRAA 28 2022    LABGLOM 23 2022      PT/INR:    Lab Results   Component Value Date    PROTIME 13.3 08/30/2021    PROTIME 11.2 09/08/2011    INR 1.03 08/30/2021      PTT:    Lab Results   Component Value Date    APTT 41.7 08/30/2021     Imaging:  Echocardiogram complete 2D with doppler with color    Result Date: 1/24/2022  Transthoracic Echocardiography Report (TTE)  Demographics   Patient Name        Yvette Ryder Date of Study          01/24/2022   Date of Birth       1950   Gender                 Male   Age                 70 year(s)   Race                   Black   Patient Number      [id number]   Room Number            4120   Visit Number        [id number]   Corporate ID        [id number]   Accession Number    [id number]   [facility]   Ordering Physician               Interpreting Physician Gordy Castillo MD  Procedure Type of Study   TTE procedure:ECHOCARDIOGRAM COMPLETE 2D W DOPPLER W COLOR. Procedure Date Date: 01/24/2022 Start: 08:03 AM Study Location: Portable Technical Quality: Fair visualization Indications:Congestive heart failure. Patient Status: Routine Height: 72 inches Weight: 270 pounds BSA: 2.42 m2 BMI: 36.62 kg/m2 HR: 63 bpm BP: 126/61 mmHg  Conclusions   Summary  Left ventricular systolic function is normal.  Ejection fraction is visually estimated at 50%. Moderate left ventricular hypertrophy. Septal flattening due to RVVO and pressure overload. PPM wiring visualized in right heart. Mildly dilated right atrium. Severely dilated right ventricle cavity. Severe tricuspid regurgitation; RVSP: 75 mmHg. No evidence of any pericardial effusion. Right pleural effusion. Dilated aortic root (4.0 cm). Inferior vena cava is dilated, measuring at 2.3 cm, and does not collapse  with respiration.    Signature   ------------------------------------------------------------------  Electronically signed by Gordy Castillo MD (Interpreting  physician) on 01/24/2022 at 03:32 PM ------------------------------------------------------------------   Findings   Left Ventricle  Left ventricular systolic function is normal.  Ejection fraction is visually estimated at 50%. Left ventricle size is normal.  Moderate left ventricular hypertrophy. Grade I diastolic dysfunction. Septal flattening due to RVVO and pressure overload. Left Atrium  Essentially normal left atrium. Right Atrium  PPM wiring visualized in right atrium. Mildly dilated right atrium. Right Ventricle  Pacer Wire visualized in right ventricle. Severely dilated right ventricle cavity. Aortic Valve  Sclerotic, but non-stenotic aortic valve. Mitral Valve  Trace mitral regurgitation. Tricuspid Valve  Severe tricuspid regurgitation; RVSP: 75 mmHg. Pulmonic Valve  The pulmonic valve was not well visualized. Pericardial Effusion  No evidence of any pericardial effusion. Pleural Effusion  Right pleural effusion. Miscellaneous  Dilated aortic root (4.0 cm). Inferior vena cava is dilated, measuring at 2.3 cm, and does not collapse  with respiration. The aorta is within normal limits.   M-Mode/2D Measurements & Calculations   LV Diastolic Dimension:  LV Systolic Dimension:  LA Dimension: 3.5 cmAO Root  4.92 cm                  2.96 cm                 Dimension: 4 cmLA Area:  LV FS:39.8 %             LV Volume Diastolic:    86.4 cm2  LV PW Diastolic: 0.73 cm 105 ml  LV PW Systolic: 2.3 cm   LV Volume Systolic: 50  Septum Diastolic: 1.98   ml  cm                       LV EDV/LV EDV Index:    RV Diastolic Dimension:  Septum Systolic: 2.4 cm  927 BJ/03 m2LV ESV/LV   4.33 cm  CO: 3.03 l/min           ESV Index: 50 ml/21 m2  CI: 1.25 l/m*m2          EF Calculated (A4C):    LA/Aorta: 0.88                           54.1 %  LV Area Diastolic: 65.5  EF Calculated (2D):     LA volume/Index: 52 ml  cm2                      70.3 %                  /04U7  LV Area Systolic: 97.1  cm2                      LV Length: 8.66 cm LVOT: 2 cm  Doppler Measurements & Calculations   MV Peak E-Wave: 86.4 AV Peak Velocity: 103 cm/s    LVOT Peak Velocity: 79.9  cm/s                 AV Peak Gradient: 4.24 mmHg   cm/s  MV Peak A-Wave: 66.5 AV Mean Velocity: 74.7 cm/s   LVOT Mean Velocity: 54.4  cm/s                 AV Mean Gradient: 2 mmHg      cm/s  MV E/A Ratio: 1.3    AV VTI: 20.8 cm               LVOT Peak Gradient: 3  MV Peak Gradient:    AV Area (Continuity):2.31 cm2 mmHgLVOT Mean Gradient: 1  2.99 mmHg                                          mmHg                       LVOT VTI: 15.3 cm             Estimated RVSP: 75 mmHg  MV P1/2t: 60 msec                                  Estimated RAP:3 mmHg  MVA by PHT:3.67 cm2  Estimated PASP: 75.25 mmHg   MV E' Septal                                       TR Velocity:425 cm/s  Velocity: 6.44 cm/s                                TR Gradient:72.25 mmHg  MV E' Lateral  Velocity: 6.76 cm/s  MV E/E' septal:  13.42  MV E/E' lateral:  12.78      CT ABDOMEN PELVIS WO CONTRAST Additional Contrast? None    Result Date: 1/22/2022  EXAMINATION: CT OF THE ABDOMEN AND PELVIS WITHOUT CONTRAST 1/22/2022 4:00 am TECHNIQUE: CT of the abdomen and pelvis was performed without the administration of intravenous contrast. Multiplanar reformatted images are provided for review. Dose modulation, iterative reconstruction, and/or weight based adjustment of the mA/kV was utilized to reduce the radiation dose to as low as reasonably achievable. COMPARISON: None.  HISTORY: ORDERING SYSTEM PROVIDED HISTORY: reports scrotal drainage; concern for signs of infection TECHNOLOGIST PROVIDED HISTORY: Reason for exam:->reports scrotal drainage; concern for signs of infection Additional Contrast?->None Decision Support Exception - unselect if not a suspected or confirmed emergency medical condition->Emergency Medical Condition (MA) Reason for Exam: reports scrotal drainage; concern for signs of infection FINDINGS: Lower Chest: Small right-sided pleural effusion with adjacent atelectasis is noted. Organs: Cholelithiasis is present. The liver, spleen, adrenal glands, pancreas, and kidneys are unremarkable. GI/Bowel: There is no bowel obstruction. Postsurgical changes are seen to the colon. Pelvis: The bladder is unremarkable. There is no free fluid. Peritoneum/Retroperitoneum: There is no free air. Trace fluid is seen within the abdomen. Bones/Soft Tissues: Diffuse body wall edema is noted. There is no evidence of soft tissue gas. No destructive osseous lesions are identified. The scrotum is not completely visualized. 1. Diffuse body wall edema is seen. There is no evidence of soft tissue gas. Scrotum is not completely visualized. 2. Small right pleural effusion with adjacent atelectasis. 3. Cholelithiasis. 4. Trace ascites within the abdomen. US SCROTUM AND TESTICLES    Result Date: 1/24/2022  EXAMINATION: ULTRASOUND OF THE SCROTUM/TESTICLES WITH COLOR DOPPLER FLOW EVALUATION; DOPPLER EVALUATION OF THE PELVIS 1/24/2022 12:47 pm; 1/24/2022 12:48 pm TECHNIQUE: Duplex ultrasound using B-mode/gray scaled imaging, Doppler spectral analysis and color flow Doppler was obtained of the testicles.; Duplex ultrasound using B-mode/gray scaled imaging and Doppler spectral analysis and color flow was obtained of the pelvis. COMPARISON: Abdomen and pelvis CT 01/22/2022 HISTORY: ORDERING SYSTEM PROVIDED HISTORY: swelling pus foul discharge from penis TECHNOLOGIST PROVIDED HISTORY: Reason for exam:->swelling pus foul discharge from penis FINDINGS: Right testis:  4.0 cm x 2.6 cm x 3.2 cm Left testis:  3.8 cm x 2.6 cm x 2.8 cm RIGHT GRAYSCALE:  Normal size, echogenicity, and echotexture. No masses nor microlithiasis. DOPPLER:  Normal degree of vascularity. Normal arterial and venous waveforms. SCROTAL SAC:  No hydrocele nor evident varicocele.   Diffuse thickening of the overlying soft tissues including the skin with interstitial fluid but no loculated fluid nor dirty shadowing suggestive of gas. EPIDIDYMIS:  Normal size and degree of vascularity. LEFT GRAYSCALE:  Normal size, echogenicity, and echotexture. No masses nor microlithiasis. DOPPLER:  Normal degree of vascularity. Normal arterial and venous waveforms. SCROTAL SAC:  Trace likely physiologic fluid with no overt hydrocele. No evident varicocele. Diffuse thickening of the overlying soft tissues including the skin with interstitial fluid with no loculated fluid nor dirty shadowing suggestive of gas. EPIDIDYMIS:  Normal size and degree of vascularity. 1. Extensive scrotal soft tissue swelling either due to edema or cellulitis. No visualized findings of abscess or necrotizing fasciitis (Aubrie gangrene). 2. Normal sonographic appearance of testes and epididymides without torsion or epididymitis. XR CHEST PORTABLE    Result Date: 1/22/2022  EXAMINATION: ONE XRAY VIEW OF THE CHEST 1/22/2022 5:21 am COMPARISON: 01/17/2022 HISTORY: ORDERING SYSTEM PROVIDED HISTORY: reports shortness of breath TECHNOLOGIST PROVIDED HISTORY: Reason for exam:->reports shortness of breath Reason for Exam: reports shortness of breath FINDINGS: The cardiac silhouette is mildly enlarged. There is a left-sided cardiac device. The lungs are clear. No pleural effusion or pneumothorax. The visualized osseous structures are unremarkable. 1. Cardiomegaly, unchanged. XR CHEST PORTABLE    Result Date: 1/17/2022  EXAMINATION: ONE XRAY VIEW OF THE CHEST 1/17/2022 12:16 am COMPARISON: October 28, 2021 HISTORY: ORDERING SYSTEM PROVIDED HISTORY: dyspnea TECHNOLOGIST PROVIDED HISTORY: Reason for exam:->dyspnea Reason for Exam: dyspnea Additional signs and symptoms: dyspnea Relevant Medical/Surgical History: dyspnea FINDINGS: Left chest wall AICD in place. Cardiomediastinal silhouette is stable. The lungs show focal consolidation or pleural effusion. No pulmonary edema. No pneumothorax.  No acute osseous abnormality. 1. No acute cardiopulmonary disease. 2. Mild cardiomegaly. US DUP ABD PEL RETRO SCROT COMPLETE    Result Date: 1/24/2022  EXAMINATION: ULTRASOUND OF THE SCROTUM/TESTICLES WITH COLOR DOPPLER FLOW EVALUATION; DOPPLER EVALUATION OF THE PELVIS 1/24/2022 12:47 pm; 1/24/2022 12:48 pm TECHNIQUE: Duplex ultrasound using B-mode/gray scaled imaging, Doppler spectral analysis and color flow Doppler was obtained of the testicles.; Duplex ultrasound using B-mode/gray scaled imaging and Doppler spectral analysis and color flow was obtained of the pelvis. COMPARISON: Abdomen and pelvis CT 01/22/2022 HISTORY: ORDERING SYSTEM PROVIDED HISTORY: swelling pus foul discharge from penis TECHNOLOGIST PROVIDED HISTORY: Reason for exam:->swelling pus foul discharge from penis FINDINGS: Right testis:  4.0 cm x 2.6 cm x 3.2 cm Left testis:  3.8 cm x 2.6 cm x 2.8 cm RIGHT GRAYSCALE:  Normal size, echogenicity, and echotexture. No masses nor microlithiasis. DOPPLER:  Normal degree of vascularity. Normal arterial and venous waveforms. SCROTAL SAC:  No hydrocele nor evident varicocele. Diffuse thickening of the overlying soft tissues including the skin with interstitial fluid but no loculated fluid nor dirty shadowing suggestive of gas. EPIDIDYMIS:  Normal size and degree of vascularity. LEFT GRAYSCALE:  Normal size, echogenicity, and echotexture. No masses nor microlithiasis. DOPPLER:  Normal degree of vascularity. Normal arterial and venous waveforms. SCROTAL SAC:  Trace likely physiologic fluid with no overt hydrocele. No evident varicocele. Diffuse thickening of the overlying soft tissues including the skin with interstitial fluid with no loculated fluid nor dirty shadowing suggestive of gas. EPIDIDYMIS:  Normal size and degree of vascularity. 1. Extensive scrotal soft tissue swelling either due to edema or cellulitis. No visualized findings of abscess or necrotizing fasciitis (Aburie gangrene).  2. Normal sonographic appearance of testes and epididymides without torsion or epididymitis. Assessment & Plan:      Shoshana Magallon is a 70y.o. year old male admitted 1/22/2022 for acute on chronic heart failure, scrotal pain and swelling.    1) Scrotal Lymphedema: Improving slowly. No signs of infection on physical exam. Difficult to fully examine perirectal and perineal area. CT scan shows no gas/infection but did not go all the way down thru scrotum. Scrotal US 1/24/22: Extensive scrotal soft tissue swelling either due to edema or cellulitis. No visualized findings of abscess or necrotizing fasciitis (Aubrie gangrene). Normal sonographic appearance of testes and epididymides without torsion or epididymitis. Recommend elevation and ice. Treat underlying lymphedema. Nephrology on board. Will follow. Patient seen and examined, chart reviewed.      Electronically signed by Maria Elena Gibson PA-C on 1/26/2022 at 11:06 AM

## 2022-01-26 NOTE — PROGRESS NOTES
[address] [facility]  consulted by Dr. Roberta Otero for monitoring and adjustment. Indication for treatment: SSTI  Goal trough: 10-15 mcg/mL  AUC/RJ:  <500    Pertinent Laboratory Values:   Temp Readings from Last 3 Encounters:   01/26/22 98.4 °F (36.9 °C) (Oral)   01/16/22 97 °F (36.1 °C) (Oral)   01/06/22 98.3 °F (36.8 °C) (Temporal)     Recent Labs     01/24/22  0715 01/25/22  0605 01/26/22  0635   WBC 11.8* 9.1 8.9     Recent Labs     01/24/22  0715 01/25/22  0605 01/26/22  0635   BUN 40* 45* 51*   CREATININE 2.2* 2.4* 2.7*     Estimated Creatinine Clearance: 35 mL/min (A) (based on SCr of 2.7 mg/dL (H)). Intake/Output Summary (Last 24 hours) at 1/26/2022 1239  Last data filed at 1/26/2022 0554  Gross per 24 hour   Intake --   Output 3730 ml   Net -3730 ml     Pertinent Cultures:  Date    Source    Results  1/23   Wound    MRSA    Vancomycin level:   TROUGH:  No results for input(s): VANCOTROUGH in the last 72 hours.   RANDOM:    Recent Labs     01/24/22  0715 01/25/22  0605 01/26/22  0635   VANCORANDOM 11.8 17.7 24.2       Assessment:  · SCr, BUN, and urine output: Scr trending up  · Day(s) of therapy: 4  · Vancomycin concentration: 24.2    Plan:  · Intermittent vancomycin dosing with CKD  · No vancomycin today   · Pharmacy will continue to monitor patient and adjust therapy as indicated    Brent 3 1/27 @ 0600    Thank you for the consult,  Rafa Goode, [facility]  1/26/2022 12:39 PM

## 2022-01-26 NOTE — PROGRESS NOTES
Cardiology Progress Note     Today's Plan: Will sign off    Admit Date:  1/22/2022    Consult reason/ Seen today for: Elevated Troponin     Subjective and  Overnight Events: Patient denies any chest pain and shortness of breath has resolved. History of Presenting Illness:    Chief complain on admission : 70 y. o.year old who is admitted for  Chief Complaint   Patient presents with    Groin Swelling     scrotum very large and leaking fluid    Leg Swelling        Past medical history:    has a past medical history of Acid reflux, Acute MI ([address] Arthritis, Broken teeth, CAD (coronary artery disease), Cardiomyopathy ([address] CHF (congestive heart failure) ([address] Chronic back pain, Chronic kidney disease, Diabetes mellitus ([address] Diabetic neuropathy ([address] H/O cardiovascular stress test, H/O Doppler ultrasound, H/O percutaneous left heart catheterization, History of irregular heartbeat, History of syncope, Hyperlipidemia, Hypertension, Leg swelling, Necrotic toes (HCC), Neuropathy, neuropathy, PAD (peripheral artery disease) ([address] PVD (peripheral vascular disease) ([address] Sick sinus syndrome ([address] Sleep apnea, Spinal stenosis, Teeth missing, Type 2 diabetes mellitus without complication ([address] and WD-Chronic foot ulcer, left, with necrosis of bone ([address] Past surgical history:   has a past surgical history that includes Coronary angioplasty with stent; Dental surgery; Colonoscopy (08/04/2016); pacemaker placement (06/04/2010); vascular surgery; colectomy (Right, 08/26/2016); Toe amputation (Right, 09/12/2017); Toe amputation (Right, 01/09/2018); Cardiac catheterization; Cardiac defibrillator placement (06/04/2010); Coronary angioplasty; Cardiac catheterization (07/14/2017); Cardiac catheterization (11/20/2018); Toe amputation (Left, 12/26/2020); and IR TUNNELED CVC PLACE WO SQ PORT/PUMP > 5 YEARS (6/14/2021).   Social History: reports that he quit smoking about 3 months ago. His smoking use included cigars. He started smoking about 42 years ago. He has a 9.00 pack-year smoking history. He has never used smokeless tobacco. He reports current drug use. Drug: Marijuana Mirella Heys). He reports that he does not drink alcohol. Family history:  family history includes Cancer in his brother, father, and son; Diabetes in his brother, mother, and sister; Early Death (age of onset: 62) in his sister; Heart Disease in his brother and sister; High Blood Pressure in his brother, brother, and mother; Neuropathy in his sister; Other in his sister; Stroke in his mother; Vision Loss in his mother. Allergies   Allergen Reactions    Pcn [Penicillins] Hives    Fentanyl Itching       Review of Systems:  Review of Systems   Respiratory: Negative for shortness of breath. Cardiovascular: Negative for chest pain, palpitations and leg swelling. Musculoskeletal: Negative. Skin: Negative. Neurological: Negative for dizziness and weakness. All other systems reviewed and are negative. /60   Pulse 60   Temp 98.4 °F (36.9 °C) (Oral)   Resp 18   Ht 6' (1.829 m)   Wt 291 lb 0.1 oz (132 kg)   SpO2 95%   BMI 39.47 kg/m²       Intake/Output Summary (Last 24 hours) at 1/26/2022 1134  Last data filed at 1/26/2022 0554  Gross per 24 hour   Intake --   Output 3730 ml   Net -3730 ml       Physical Exam:  Physical Exam  Constitutional:       Appearance: He is well-developed. Cardiovascular:      Rate and Rhythm: Normal rate and regular rhythm. Pulses: Intact distal pulses. Dorsalis pedis pulses are 1+ on the right side and 1+ on the left side. Posterior tibial pulses are 1+ on the right side and 1+ on the left side. Heart sounds: Normal heart sounds, S1 normal and S2 normal.   Pulmonary:      Effort: Pulmonary effort is normal.      Breath sounds: Normal breath sounds.    Genitourinary:     Testes:         Right: Swelling present. Left: Swelling present. Musculoskeletal:         General: Normal range of motion. Right lower leg: Edema present. Left lower leg: Edema present. Skin:     General: Skin is warm and dry. Neurological:      Mental Status: He is alert and oriented to person, place, and time. Medications:    cefepime  2,000 mg IntraVENous Q24H    [START ON 1/27/2022] metOLazone  5 mg Oral Daily    [START ON 1/27/2022] spironolactone  12.5 mg Oral Daily    insulin lispro  0-18 Units SubCUTAneous TID WC    morphine  2 mg IntraVENous Once    furosemide  80 mg IntraVENous 4x Daily    atorvastatin  40 mg Oral Nightly    carvedilol  3.125 mg Oral BID    clopidogrel  75 mg Oral Daily    metaxalone  800 mg Oral TID    tiotropium  2 puff Inhalation Daily    sodium chloride flush  5-40 mL IntraVENous 2 times per day    heparin (porcine)  5,000 Units SubCUTAneous 3 times per day    lisinopril  5 mg Oral Daily    insulin lispro  0-9 Units SubCUTAneous Nightly      sodium chloride 25 mL (01/26/22 0434)    dextrose       oxyCODONE, albuterol sulfate HFA, sodium chloride flush, sodium chloride, ondansetron **OR** ondansetron, polyethylene glycol, acetaminophen **OR** acetaminophen, glucose, glucagon (rDNA), dextrose, acetaminophen, ondansetron, benzonatate, melatonin, ALPRAZolam, aluminum & magnesium hydroxide-simethicone, morphine, dextrose bolus (hypoglycemia) **OR** dextrose bolus (hypoglycemia)    Lab Data:  CBC:   Recent Labs     01/24/22  0715 01/25/22  0605 01/26/22  0635   WBC 11.8* 9.1 8.9   HGB 8.8* 8.3* 8.3*   HCT 30.0* 27.3* 28.4*   MCV 88.0 85.0 87.1    204 222     BMP:   Recent Labs     01/24/22  0715 01/25/22  0605 01/26/22  0635    136 138   K 5.0 4.8 5.1    101 100   CO2 23 24 27   PHOS 2.9  --  3.8   BUN 40* 45* 51*   CREATININE 2.2* 2.4* 2.7*     PT/INR: No results for input(s): PROTIME, INR in the last 72 hours.   BNP:    No results for input(s): PROBNP in the last 72 hours. TROPONIN:   No results for input(s): TROPONINT in the last 72 hours. Impression:  Principal Problem:    Acute on chronic HFrEF (heart failure with reduced ejection fraction) ([facility])  Active Problems:    Type 2 diabetes mellitus with diabetic polyneuropathy ([facility])    Leg edema    Scrotal edema    Hypertensive urgency  Resolved Problems:    * No resolved hospital problems. *          CARDIOLOGY ATTENDING ADDENDUM    I have seen, spoken to and examined this patient personally, independent of the NP/PAC. I have reviewed the hospital care given to date and reviewed all pertinent labs and imaging. I have spoken with patient, nursing staff and provided written and verbal instructions . The above note has been reviewed. I have spent substantive amount of time in formulating patient care. HPI:      Dyspnea improving  LE swelling improving       Physical Exam:    General:   Awake, alert  Head:normal  Eye:normal  Chest:  Clear to auscultation + Basilar crackles   Cardiovascular:  S1S2   Abdomen: soft   Extremities:  +1 edema  Pulses; palpable      MEDICAL DECISION MAKING :          1. Elevated troponin:Non-ACS trend Type II MI Demand ischemia. No ischemic work-up planned at this point. 2. H/O CAD s/p PCI: Last left heart cath in 2018 with patent stents noted in LAD circumflex and RCA. Continue with Plavix, Coreg, and Lipitor. 3. CHF, RV failure:History of noncompliance. Continue with IV Lasix 80 mg 4 times daily. Fluid status - 12.6 L. Echocardiogram severe volume overload with RV failure/elevated pressure    4. Essential hypertension: Stable,Continue with Coreg, Aldactone, and lisinopril    5. HLD: Cont statins     6. Acute renal failure management per nephrology    Will sign off, please re-consult for any new cardiac complaints or concerns.                   Dr. Lis Harvey MD

## 2022-01-26 NOTE — PROGRESS NOTES
Physician Progress Note      PATIENT:               Giselle Mccurdy  CSN #:                  [id number]  :                       1950  ADMIT DATE:       2022 3:40 AM  DISCH DATE:  RESPONDING  PROVIDER #:        Venkata Rawls          QUERY TEXT:    Pt admitted with acute on chronic systolic CHF. Pt noted to also have HTN,   CAD, tricuspid regurgitation. If possible, please document in progress notes   and discharge summary the etiology of CHF, if able to be determined. The medical record reflects the following:  Risk Factors: HTN, CAD, Valvular disease  Clinical Indicators: Pt has documented PMH of HTN and CAD. ECHO dated 22   shows severe tricuspid regurgitation, as well as Ejection fraction is visually   estimated at 50%. Moderate left ventricular hypertrophy. Septal flattening   due to RVVO and pressure overload. PPM wiring visualized in right heart. Mildly dilated right atrium. Severely dilated right ventricle cavity. Treatment: Coreg and Amlodipine are home meds, Inpatient meds include   Chlorothiazide IV, Lasix IV, Spironolactone, and Lisinopril. Card consult,   ECHO. Thank you,  Dar Ibrahim, RN  [phone]  Options provided:  -- CHF due to Hypertensive Heart Disease  -- CHF due to Hypertensive Heart Disease and CAD  -- CHF not due to Hypertension but due to CAD  -- CHF due to Hypertensive Heart Disease and Valvular Heart Disease  -- CHF not due to Hypertension but due to valvular heart disease  -- Other - I will add my own diagnosis  -- Disagree - Not applicable / Not valid  -- Disagree - Clinically unable to determine / Unknown  -- Refer to Clinical Documentation Reviewer    PROVIDER RESPONSE TEXT:    Acute right sided CHF could be due to valvular heart disease    Query created by: Mehdi Molina on 2022 11:00 AM      QUERY TEXT:    Patient admitted with acute on chronic CHF exacerbation.   Noted documentation   of acute on chronic heart failure with reduced EF in H&P and subsequent   documentation by hospitalists and acute Systolic/ Diastolic decompensated   heart failure in Consult Note dated 1/23/22 by Dr. Yg Jenkins, Cardiology   consultant. If possible, please document in progress notes and discharge summary if you   are evaluating and /or treating any of the following: The medical record reflects the following:  Risk Factors: HTN, CAD  Clinical Indicators: ECHO 1/24/22: Left ventricular systolic function is   normal. Ejection fraction is visually estimated at 50%. Moderate left ventricular hypertrophy. Septal flattening due to RVVO and   pressure overload. PPM wiring visualized in right heart. Mildly dilated right atrium. Severely dilated right ventricle cavity. Severe   tricuspid regurgitation; RVSP: 75 mmHg. No evidence of any pericardial   effusion. Right pleural effusion. Dilated aortic root (4.0 cm). Inferior vena   cava is dilated, measuring at 2.3 cm, and does not collapse with respiration. Pro-BNP [id number]. CXR: cardiomegaly. 2-3+ BLE edema, scrotal edema. Treatment: Lasix, Spironolactone, Chlorothiazide, Coreg, Card consult, strict   I/O, daily weights    Thank you,  Abiola Welch, RN  [phone]  Options provided:  -- Acute on chronic systolic CHF confirmed and Acute on chronic systolic and   diastolic CHF ruled out  -- Acute on chronic systolic and diastolic CHF confirmed and Acute on chronic   systolic CHF ruled out  -- Other - I will add my own diagnosis  -- Disagree - Not applicable / Not valid  -- Disagree - Clinically unable to determine / Unknown  -- Refer to Clinical Documentation Reviewer    PROVIDER RESPONSE TEXT:    After study, Acute on chronic systolic and diastolic CHF confirmed and Acute   on chronic systolic CHF ruled out. Query created by: Elvin Hoffman on 1/25/2022 11:12 AM      QUERY TEXT:    Patient admitted with BMI 40.1.  If possible, please document in progress notes   and discharge summary if you are evaluating and /or treating any of the following: The medical record reflects the following:  Risk Factors: multiple co-morbidities, lifestyle choices  Clinical Indicators: Pt has BMI of 40.1  Treatment: Will require lifestyle modification    Thank you,  Noemi Gruber RN  Options provided:  -- [Morbid obesity  -- Other - I will add my own diagnosis  -- Disagree - Not applicable / Not valid  -- Disagree - Clinically unable to determine / Unknown  -- Refer to Clinical Documentation Reviewer    PROVIDER RESPONSE TEXT:    This patient has morbid obesity.     Query created by: Vicky Clayton on 1/25/2022 11:14 AM      Electronically signed by:  Vijaya Sutton 1/26/2022 8:37 AM

## 2022-01-27 LAB
ANION GAP SERPL CALCULATED.3IONS-SCNC: 10 MMOL/L (ref 4–16)
BASOPHILS ABSOLUTE: 0 K/CU MM
BASOPHILS RELATIVE PERCENT: 0.3 % (ref 0–1)
BUN BLDV-MCNC: 53 MG/DL (ref 6–23)
CALCIUM SERPL-MCNC: 7.8 MG/DL (ref 8.3–10.6)
CHLORIDE BLD-SCNC: 98 MMOL/L (ref 99–110)
CO2: 26 MMOL/L (ref 21–32)
CREAT SERPL-MCNC: 2.7 MG/DL (ref 0.9–1.3)
CULTURE: ABNORMAL
DIFFERENTIAL TYPE: ABNORMAL
DOSE AMOUNT: NORMAL
DOSE TIME: NORMAL
EOSINOPHILS ABSOLUTE: 0.1 K/CU MM
EOSINOPHILS RELATIVE PERCENT: 1.1 % (ref 0–3)
FOLATE: 6.2 NG/ML (ref 3.1–17.5)
GFR AFRICAN AMERICAN: 28 ML/MIN/1.73M2
GFR NON-AFRICAN AMERICAN: 23 ML/MIN/1.73M2
GLUCOSE BLD-MCNC: 179 MG/DL (ref 70–99)
GLUCOSE BLD-MCNC: 203 MG/DL (ref 70–99)
GLUCOSE BLD-MCNC: 254 MG/DL (ref 70–99)
GLUCOSE BLD-MCNC: 281 MG/DL (ref 70–99)
GLUCOSE BLD-MCNC: 97 MG/DL (ref 70–99)
HCT VFR BLD CALC: 27.3 % (ref 42–52)
HEMOGLOBIN: 8.3 GM/DL (ref 13.5–18)
IMMATURE NEUTROPHIL %: 0.2 % (ref 0–0.43)
IRON: 30 UG/DL (ref 59–158)
LYMPHOCYTES ABSOLUTE: 1.1 K/CU MM
LYMPHOCYTES RELATIVE PERCENT: 12.2 % (ref 24–44)
Lab: ABNORMAL
MAGNESIUM: 2 MG/DL (ref 1.8–2.4)
MCH RBC QN AUTO: 25.9 PG (ref 27–31)
MCHC RBC AUTO-ENTMCNC: 30.4 % (ref 32–36)
MCV RBC AUTO: 85 FL (ref 78–100)
MONOCYTES ABSOLUTE: 0.8 K/CU MM
MONOCYTES RELATIVE PERCENT: 8.7 % (ref 0–4)
NUCLEATED RBC %: 0 %
PCT TRANSFERRIN: 16 % (ref 10–44)
PDW BLD-RTO: 20 % (ref 11.7–14.9)
PHOSPHORUS: 3.8 MG/DL (ref 2.5–4.9)
PLATELET # BLD: 232 K/CU MM (ref 140–440)
PMV BLD AUTO: 9.8 FL (ref 7.5–11.1)
POTASSIUM SERPL-SCNC: 4.6 MMOL/L (ref 3.5–5.1)
RBC # BLD: 3.21 M/CU MM (ref 4.6–6.2)
SEGMENTED NEUTROPHILS ABSOLUTE COUNT: 6.8 K/CU MM
SEGMENTED NEUTROPHILS RELATIVE PERCENT: 77.5 % (ref 36–66)
SODIUM BLD-SCNC: 134 MMOL/L (ref 135–145)
SPECIMEN: ABNORMAL
TOTAL IMMATURE NEUTOROPHIL: 0.02 K/CU MM
TOTAL IRON BINDING CAPACITY: 182 UG/DL (ref 250–450)
TOTAL NUCLEATED RBC: 0 K/CU MM
UNSATURATED IRON BINDING CAPACITY: 152 UG/DL (ref 110–370)
VANCOMYCIN RANDOM: 17.3 UG/ML
VITAMIN B-12: 656.6 PG/ML (ref 211–911)
WBC # BLD: 8.7 K/CU MM (ref 4–10.5)

## 2022-01-27 PROCEDURE — 2580000003 HC RX 258: Performed by: PHYSICIAN ASSISTANT

## 2022-01-27 PROCEDURE — 6360000002 HC RX W HCPCS: Performed by: INTERNAL MEDICINE

## 2022-01-27 PROCEDURE — 6370000000 HC RX 637 (ALT 250 FOR IP): Performed by: INTERNAL MEDICINE

## 2022-01-27 PROCEDURE — 6370000000 HC RX 637 (ALT 250 FOR IP): Performed by: PHYSICIAN ASSISTANT

## 2022-01-27 PROCEDURE — 6360000002 HC RX W HCPCS: Performed by: PHYSICIAN ASSISTANT

## 2022-01-27 PROCEDURE — 82607 VITAMIN B-12: CPT

## 2022-01-27 PROCEDURE — 2580000003 HC RX 258: Performed by: INTERNAL MEDICINE

## 2022-01-27 PROCEDURE — 36415 COLL VENOUS BLD VENIPUNCTURE: CPT

## 2022-01-27 PROCEDURE — 1200000000 HC SEMI PRIVATE

## 2022-01-27 PROCEDURE — 85025 COMPLETE CBC W/AUTO DIFF WBC: CPT

## 2022-01-27 PROCEDURE — 82746 ASSAY OF FOLIC ACID SERUM: CPT

## 2022-01-27 PROCEDURE — 83550 IRON BINDING TEST: CPT

## 2022-01-27 PROCEDURE — 94761 N-INVAS EAR/PLS OXIMETRY MLT: CPT

## 2022-01-27 PROCEDURE — 80048 BASIC METABOLIC PNL TOTAL CA: CPT

## 2022-01-27 PROCEDURE — 2700000000 HC OXYGEN THERAPY PER DAY

## 2022-01-27 PROCEDURE — 80202 ASSAY OF VANCOMYCIN: CPT

## 2022-01-27 PROCEDURE — 83540 ASSAY OF IRON: CPT

## 2022-01-27 PROCEDURE — 82962 GLUCOSE BLOOD TEST: CPT

## 2022-01-27 PROCEDURE — 84100 ASSAY OF PHOSPHORUS: CPT

## 2022-01-27 PROCEDURE — 83735 ASSAY OF MAGNESIUM: CPT

## 2022-01-27 RX ADMIN — SODIUM CHLORIDE, PRESERVATIVE FREE 10 ML: 5 INJECTION INTRAVENOUS at 21:07

## 2022-01-27 RX ADMIN — CARVEDILOL 3.12 MG: 6.25 TABLET, FILM COATED ORAL at 21:06

## 2022-01-27 RX ADMIN — FUROSEMIDE 80 MG: 10 INJECTION, SOLUTION INTRAMUSCULAR; INTRAVENOUS at 13:03

## 2022-01-27 RX ADMIN — ATORVASTATIN CALCIUM 40 MG: 40 TABLET, FILM COATED ORAL at 21:06

## 2022-01-27 RX ADMIN — SODIUM CHLORIDE, PRESERVATIVE FREE 10 ML: 5 INJECTION INTRAVENOUS at 09:07

## 2022-01-27 RX ADMIN — MORPHINE SULFATE 2 MG: 2 INJECTION, SOLUTION INTRAMUSCULAR; INTRAVENOUS at 13:03

## 2022-01-27 RX ADMIN — LISINOPRIL 5 MG: 5 TABLET ORAL at 09:06

## 2022-01-27 RX ADMIN — VANCOMYCIN HYDROCHLORIDE 1000 MG: 1 INJECTION, POWDER, LYOPHILIZED, FOR SOLUTION INTRAVENOUS at 15:51

## 2022-01-27 RX ADMIN — METOLAZONE 5 MG: 5 TABLET ORAL at 09:07

## 2022-01-27 RX ADMIN — FUROSEMIDE 80 MG: 10 INJECTION, SOLUTION INTRAMUSCULAR; INTRAVENOUS at 17:46

## 2022-01-27 RX ADMIN — ALPRAZOLAM 0.25 MG: 0.25 TABLET ORAL at 21:06

## 2022-01-27 RX ADMIN — FUROSEMIDE 80 MG: 10 INJECTION, SOLUTION INTRAMUSCULAR; INTRAVENOUS at 09:07

## 2022-01-27 RX ADMIN — MORPHINE SULFATE 2 MG: 2 INJECTION, SOLUTION INTRAMUSCULAR; INTRAVENOUS at 23:39

## 2022-01-27 RX ADMIN — CEFEPIME HYDROCHLORIDE 2000 MG: 2 INJECTION, POWDER, FOR SOLUTION INTRAVENOUS at 14:30

## 2022-01-27 RX ADMIN — MORPHINE SULFATE 2 MG: 2 INJECTION, SOLUTION INTRAMUSCULAR; INTRAVENOUS at 05:50

## 2022-01-27 RX ADMIN — CLOPIDOGREL 75 MG: 75 TABLET, FILM COATED ORAL at 09:07

## 2022-01-27 RX ADMIN — MORPHINE SULFATE 2 MG: 2 INJECTION, SOLUTION INTRAMUSCULAR; INTRAVENOUS at 18:01

## 2022-01-27 RX ADMIN — SPIRONOLACTONE 12.5 MG: 25 TABLET ORAL at 09:07

## 2022-01-27 RX ADMIN — CARVEDILOL 3.12 MG: 6.25 TABLET, FILM COATED ORAL at 09:06

## 2022-01-27 RX ADMIN — HEPARIN SODIUM 5000 UNITS: 5000 INJECTION INTRAVENOUS; SUBCUTANEOUS at 14:25

## 2022-01-27 RX ADMIN — HEPARIN SODIUM 5000 UNITS: 5000 INJECTION INTRAVENOUS; SUBCUTANEOUS at 21:06

## 2022-01-27 RX ADMIN — OXYCODONE HYDROCHLORIDE 7.5 MG: 5 TABLET ORAL at 21:07

## 2022-01-27 RX ADMIN — FUROSEMIDE 80 MG: 10 INJECTION, SOLUTION INTRAMUSCULAR; INTRAVENOUS at 23:38

## 2022-01-27 RX ADMIN — OXYCODONE HYDROCHLORIDE 7.5 MG: 5 TABLET ORAL at 09:06

## 2022-01-27 RX ADMIN — HEPARIN SODIUM 5000 UNITS: 5000 INJECTION INTRAVENOUS; SUBCUTANEOUS at 05:50

## 2022-01-27 ASSESSMENT — PAIN SCALES - GENERAL
PAINLEVEL_OUTOF10: 9
PAINLEVEL_OUTOF10: 8
PAINLEVEL_OUTOF10: 10
PAINLEVEL_OUTOF10: 7
PAINLEVEL_OUTOF10: 8
PAINLEVEL_OUTOF10: 7
PAINLEVEL_OUTOF10: 7

## 2022-01-27 ASSESSMENT — PAIN DESCRIPTION - LOCATION: LOCATION: FOOT;TOE (COMMENT WHICH ONE)

## 2022-01-27 ASSESSMENT — PAIN DESCRIPTION - PAIN TYPE: TYPE: ACUTE PAIN

## 2022-01-27 NOTE — PROGRESS NOTES
[address] [facility]  consulted by Dr. Jorge Luis Cano for monitoring and adjustment. Indication for treatment: SSTI  Goal trough: 10-15 mcg/mL  AUC/RJ:  <500    Pertinent Laboratory Values:   Temp Readings from Last 3 Encounters:   01/27/22 97.9 °F (36.6 °C) (Oral)   01/16/22 97 °F (36.1 °C) (Oral)   01/06/22 98.3 °F (36.8 °C) (Temporal)     Recent Labs     01/25/22  0605 01/26/22  0635 01/27/22  0337   WBC 9.1 8.9 8.7     Recent Labs     01/25/22  0605 01/26/22  0635 01/27/22 0337   BUN 45* 51* 53*   CREATININE 2.4* 2.7* 2.7*     Estimated Creatinine Clearance: 35 mL/min (A) (based on SCr of 2.7 mg/dL (H)). Intake/Output Summary (Last 24 hours) at 1/27/2022 1228  Last data filed at 1/27/2022 0554  Gross per 24 hour   Intake --   Output 1950 ml   Net -1950 ml     Pertinent Cultures:  Date    Source    Results  1/23   Wound    MRSA    Vancomycin level:   TROUGH:  No results for input(s): VANCOTROUGH in the last 72 hours. RANDOM:    Recent Labs     01/25/22  0605 01/26/22 0635 01/27/22 0337   VANCORANDOM 17.7 24.2 17.3       Assessment:  · SCr, BUN, and urine output: NANCY on CKD, SCR now steady @2.7, UOP good  · Day(s) of therapy: 5  · Vancomycin concentration:   · 1/26 - 24.2  · 1/27 - 17.3, 29 hour level post 1250mg dose    Plan:  · Intermittent vancomycin dosing with CKD  · Vanco level @ 17.3 this am, ok to re-dose  · Give vancomycin 1000mg ivpb x1 dose today  · Repeat the vanco level in 2 days  · Pharmacy will continue to monitor patient and adjust therapy as indicated    VANCOMYCIN CONCENTRATION SCHEDULED FOR 1/29 @ 0600    Thank you for the consult,  Enrrique Silva.  Jovita Bolivar, [facility]  1/27/2022 12:28 PM

## 2022-01-27 NOTE — PROGRESS NOTES
Hospitalist Progress Note      Name:  David Villatoro /Age/Sex: 1950  (70 y.o. male)   MRN & CSN:  [id number] & [id number] Admission Date/Time: 2022  3:40 AM   Location:  [address] [place] [address] PCP: Maggie Barriga Day: 6    Assessment and Plan:   Molly Guerra a 70 y.o.  male with PMH of DM 2, HFrEF/right-sided CHF, HTN, HLD, SSS, s/p PCM and diabetic neuropathy admitted on 2022 with complaints of increasing lower extremity edema associated with orthopnea with findings of acute on chronic HFrEF along with scrotal lymphedema.     Acute on chronic biventricular decompensated heart failure  Elevated troponin  -Edema significantly improved since admission  -Echo  with preserved EF; severely dilated right ventricle with severe tricuspid regurgitation RVSP of 75  -We will continue 80 IV Lasix 4 times a day  -Metolazone added today  -Continue spironolactone    Scrotal lymphedema  Penile and scrotal purulence  -Edema improving daily  -No further purulence  -Scrotal cultures growing MRSA  -Renal ultrasound with extensive soft tissue swelling; no visualized abscess noted  -Urology following we will continue elevation and ice  -Continue IV antibiotics for now    History of CKD stage IV  -Nephrology following for diuretic regimen  -Creatinine slight increase from  however currently stable in setting of diuretic therapy for above  -Appreciate nephrology recommendations    Chronic medical conditions: Home medications resumed was contraindicated  Hypertension  Diabetes mellitus type 2  Sick sinus syndrome status post pacer  PAD  Diabetic foot wounds  Status post right-sided crepitation  Chronic normocytic anemia    Reason for continued hospitalization: Continue IV diuresis     Diet ADULT DIET; Regular; 5 carb choices (75 gm/meal);  Low Sodium (2 gm)   DVT Prophylaxis [] Lovenox, []  Heparin, [] SCDs, [] Ambulation   GI Prophylaxis [] PPI,  [] H2 Blocker,  [] Carafate,  [] Diet/Tube Feeds Code Status Full Code   Disposition Patient requires continued admission due to    MDM [] Low, [] Moderate,[]  High  Patient's risk as above due to      History of Present Illness:     Patient resting comfortably on 1 L nasal cannula; states his swelling is significantly improved since admission; denies any fevers chills or chest pains    Objective: Intake/Output Summary (Last 24 hours) at 1/27/2022 1204  Last data filed at 1/27/2022 0554  Gross per 24 hour   Intake --   Output 1950 ml   Net -1950 ml      Vitals:   Vitals:    01/27/22 0755   BP: 129/70   Pulse: 70   Resp: 18   Temp: 97.9 °F (36.6 °C)   SpO2: 95%     Physical Exam:   GEN Awake male, sitting upright in bed in no apparent distress. Appears given age. NECK Supple, no apparent thyromegaly or masses. RESP Clear to auscultation, no wheezes, rales or rhonchi. Symmetric chest movement while on room air. CARDIO/VASC S1/S2 auscultated. Regular rate without appreciable murmurs, rubs, or gallops. No JVD or carotid bruits. GI Abdomen is soft without significant tenderness, masses, or guarding   Fuller in place; no purulence; scrotal edema much improved compared to admissioin  MSK No gross joint deformities. SKIN Normal coloration, warm, dry  NEURO Cranial nerves appear grossly intact, normal speech  PSYCH Awake, alert, oriented x 4. Affect appropriate.     Medications:   Medications:    tiotropium  2 puff Inhalation Nightly    cefepime  2,000 mg IntraVENous Q24H    metOLazone  5 mg Oral Daily    spironolactone  12.5 mg Oral Daily    vancomycin (VANCOCIN) intermittent dosing (placeholder)   Other See Admin Instructions    metaxalone  400 mg Oral TID    insulin lispro  0-18 Units SubCUTAneous TID WC    morphine  2 mg IntraVENous Once    furosemide  80 mg IntraVENous 4x Daily    atorvastatin  40 mg Oral Nightly    carvedilol  3.125 mg Oral BID    clopidogrel  75 mg Oral Daily    sodium chloride flush  5-40 mL IntraVENous 2 times per day  heparin (porcine)  5,000 Units SubCUTAneous 3 times per day    lisinopril  5 mg Oral Daily    insulin lispro  0-9 Units SubCUTAneous Nightly      Infusions:    sodium chloride 25 mL (01/26/22 1211)    dextrose       PRN Meds: oxyCODONE, 7.5 mg, Q4H PRN  albuterol sulfate HFA, 2 puff, Q4H PRN  sodium chloride flush, 5-40 mL, PRN  sodium chloride, 25 mL, PRN  ondansetron, 4 mg, Q8H PRN   Or  ondansetron, 4 mg, Q6H PRN  polyethylene glycol, 17 g, Daily PRN  acetaminophen, 650 mg, Q6H PRN   Or  acetaminophen, 650 mg, Q6H PRN  glucose, 15 g, PRN  glucagon (rDNA), 1 mg, PRN  dextrose, 100 mL/hr, PRN  acetaminophen, 650 mg, Q6H PRN  ondansetron, 4 mg, Q6H PRN  benzonatate, 100 mg, TID PRN  melatonin, 3 mg, Nightly PRN  ALPRAZolam, 0.25 mg, Nightly PRN  aluminum & magnesium hydroxide-simethicone, 30 mL, Q6H PRN  morphine, 2 mg, Q4H PRN  dextrose bolus (hypoglycemia), 125 mL, PRN   Or  dextrose bolus (hypoglycemia), 250 mL, PRN          Electronically signed by Shakir Campuzano MD on 1/27/2022 at 12:04 PM

## 2022-01-27 NOTE — PROGRESS NOTES
[facility] [address]   Progress Note  [facility] 0 1 2      Date: 2022   Patient: David Villatoro   : 1950   DOA: 2022   MRN: [id number]   ROOM#: [id number]     Admit Date: 2022     Collaborating Urologist on Call at time of admission: Dr. Az Angulo  CC: \"My balls hurt\"  Reason for Consult:  Scrotal pain and swelling    Subjective:     Pain: mild, no nausea and no vomiting,   Bowel Movement/Flatus:   Yes  Voidinfr parkinson catheter, urine clear yellow     Pt resting in bed, continues to endorse improvement in his scrotal pain/swelling. Objective:    Vitals:    BP (!) 147/104   Pulse 56   Temp 97.7 °F (36.5 °C) (Axillary)   Resp 18   Ht 6' (1.829 m)   Wt 291 lb 0.1 oz (132 kg)   SpO2 94%   BMI 39.47 kg/m²    Temp  Av.8 °F (36.6 °C)  Min: 97.5 °F (36.4 °C)  Max: 98.4 °F (36.9 °C)       Intake/Output Summary (Last 24 hours) at 2022 1443  Last data filed at 2022 0554  Gross per 24 hour   Intake --   Output 1950 ml   Net -1950 ml       Physical Exam:   General appearance: alert, appears stated age, cooperative, no distress and moderately obese  Head: Normocephalic, without obvious abnormality, atraumatic  Back: No CVA tenderness  Abdomen: Soft, non-tender, mildly distended  Male genitalia: Diffuse penile/scrotal swelling with no crepitus, fluctuance, drainage, Aubrie's, or signs of infection noted. 16fr parkinson catheter in place, urine clear yellow.     Labs:   WBC:    Lab Results   Component Value Date    WBC 8.7 2022      Hemoglobin/Hematocrit:    Lab Results   Component Value Date    HGB 8.3 2022    HCT 27.3 2022      BMP:   Lab Results   Component Value Date     2022    K 4.6 2022    K 5.1 01/10/2018    CL 98 2022    CO2 26 2022    BUN 53 2022    LABALBU 2.8 2022    CREATININE 2.7 2022    CALCIUM 7.8 2022    GFRAA 28 2022    LABGLOM 23 2022      PT/INR:    Lab Results ------------------------------------------------------------------   Findings   Left Ventricle  Left ventricular systolic function is normal.  Ejection fraction is visually estimated at 50%. Left ventricle size is normal.  Moderate left ventricular hypertrophy. Grade I diastolic dysfunction. Septal flattening due to RVVO and pressure overload. Left Atrium  Essentially normal left atrium. Right Atrium  PPM wiring visualized in right atrium. Mildly dilated right atrium. Right Ventricle  Pacer Wire visualized in right ventricle. Severely dilated right ventricle cavity. Aortic Valve  Sclerotic, but non-stenotic aortic valve. Mitral Valve  Trace mitral regurgitation. Tricuspid Valve  Severe tricuspid regurgitation; RVSP: 75 mmHg. Pulmonic Valve  The pulmonic valve was not well visualized. Pericardial Effusion  No evidence of any pericardial effusion. Pleural Effusion  Right pleural effusion. Miscellaneous  Dilated aortic root (4.0 cm). Inferior vena cava is dilated, measuring at 2.3 cm, and does not collapse  with respiration. The aorta is within normal limits.   M-Mode/2D Measurements & Calculations   LV Diastolic Dimension:  LV Systolic Dimension:  LA Dimension: 3.5 cmAO Root  4.92 cm                  2.96 cm                 Dimension: 4 cmLA Area:  LV FS:39.8 %             LV Volume Diastolic:    45.5 cm2  LV PW Diastolic: 2.20 cm 138 ml  LV PW Systolic: 2.3 cm   LV Volume Systolic: 50  Septum Diastolic: 5.48   ml  cm                       LV EDV/LV EDV Index:    RV Diastolic Dimension:  Septum Systolic: 2.4 cm  192 DO/11 m2LV ESV/LV   4.33 cm  CO: 3.03 l/min           ESV Index: 50 ml/21 m2  CI: 1.25 l/m*m2          EF Calculated (A4C):    LA/Aorta: 0.88                           54.1 %  LV Area Diastolic: 59.3  EF Calculated (2D):     LA volume/Index: 52 ml  cm2                      70.3 %                  /06N4  LV Area Systolic: 76.4  cm2                      LV Length: 8.66 cm LVOT: 2 cm  Doppler Measurements & Calculations   MV Peak E-Wave: 86.4 AV Peak Velocity: 103 cm/s    LVOT Peak Velocity: 79.9  cm/s                 AV Peak Gradient: 4.24 mmHg   cm/s  MV Peak A-Wave: 66.5 AV Mean Velocity: 74.7 cm/s   LVOT Mean Velocity: 54.4  cm/s                 AV Mean Gradient: 2 mmHg      cm/s  MV E/A Ratio: 1.3    AV VTI: 20.8 cm               LVOT Peak Gradient: 3  MV Peak Gradient:    AV Area (Continuity):2.31 cm2 mmHgLVOT Mean Gradient: 1  2.99 mmHg                                          mmHg                       LVOT VTI: 15.3 cm             Estimated RVSP: 75 mmHg  MV P1/2t: 60 msec                                  Estimated RAP:3 mmHg  MVA by PHT:3.67 cm2  Estimated PASP: 75.25 mmHg   MV E' Septal                                       TR Velocity:425 cm/s  Velocity: 6.44 cm/s                                TR Gradient:72.25 mmHg  MV E' Lateral  Velocity: 6.76 cm/s  MV E/E' septal:  13.42  MV E/E' lateral:  12.78      CT ABDOMEN PELVIS WO CONTRAST Additional Contrast? None    Result Date: 1/22/2022  EXAMINATION: CT OF THE ABDOMEN AND PELVIS WITHOUT CONTRAST 1/22/2022 4:00 am TECHNIQUE: CT of the abdomen and pelvis was performed without the administration of intravenous contrast. Multiplanar reformatted images are provided for review. Dose modulation, iterative reconstruction, and/or weight based adjustment of the mA/kV was utilized to reduce the radiation dose to as low as reasonably achievable. COMPARISON: None.  HISTORY: ORDERING SYSTEM PROVIDED HISTORY: reports scrotal drainage; concern for signs of infection TECHNOLOGIST PROVIDED HISTORY: Reason for exam:->reports scrotal drainage; concern for signs of infection Additional Contrast?->None Decision Support Exception - unselect if not a suspected or confirmed emergency medical condition->Emergency Medical Condition (MA) Reason for Exam: reports scrotal drainage; concern for signs of infection FINDINGS: Lower Chest: Small right-sided pleural effusion with adjacent atelectasis is noted. Organs: Cholelithiasis is present. The liver, spleen, adrenal glands, pancreas, and kidneys are unremarkable. GI/Bowel: There is no bowel obstruction. Postsurgical changes are seen to the colon. Pelvis: The bladder is unremarkable. There is no free fluid. Peritoneum/Retroperitoneum: There is no free air. Trace fluid is seen within the abdomen. Bones/Soft Tissues: Diffuse body wall edema is noted. There is no evidence of soft tissue gas. No destructive osseous lesions are identified. The scrotum is not completely visualized. 1. Diffuse body wall edema is seen. There is no evidence of soft tissue gas. Scrotum is not completely visualized. 2. Small right pleural effusion with adjacent atelectasis. 3. Cholelithiasis. 4. Trace ascites within the abdomen. US SCROTUM AND TESTICLES    Result Date: 1/24/2022  EXAMINATION: ULTRASOUND OF THE SCROTUM/TESTICLES WITH COLOR DOPPLER FLOW EVALUATION; DOPPLER EVALUATION OF THE PELVIS 1/24/2022 12:47 pm; 1/24/2022 12:48 pm TECHNIQUE: Duplex ultrasound using B-mode/gray scaled imaging, Doppler spectral analysis and color flow Doppler was obtained of the testicles.; Duplex ultrasound using B-mode/gray scaled imaging and Doppler spectral analysis and color flow was obtained of the pelvis. COMPARISON: Abdomen and pelvis CT 01/22/2022 HISTORY: ORDERING SYSTEM PROVIDED HISTORY: swelling pus foul discharge from penis TECHNOLOGIST PROVIDED HISTORY: Reason for exam:->swelling pus foul discharge from penis FINDINGS: Right testis:  4.0 cm x 2.6 cm x 3.2 cm Left testis:  3.8 cm x 2.6 cm x 2.8 cm RIGHT GRAYSCALE:  Normal size, echogenicity, and echotexture. No masses nor microlithiasis. DOPPLER:  Normal degree of vascularity. Normal arterial and venous waveforms. SCROTAL SAC:  No hydrocele nor evident varicocele.   Diffuse thickening of the overlying soft tissues including the skin with interstitial fluid but no loculated fluid nor dirty shadowing suggestive of gas. EPIDIDYMIS:  Normal size and degree of vascularity. LEFT GRAYSCALE:  Normal size, echogenicity, and echotexture. No masses nor microlithiasis. DOPPLER:  Normal degree of vascularity. Normal arterial and venous waveforms. SCROTAL SAC:  Trace likely physiologic fluid with no overt hydrocele. No evident varicocele. Diffuse thickening of the overlying soft tissues including the skin with interstitial fluid with no loculated fluid nor dirty shadowing suggestive of gas. EPIDIDYMIS:  Normal size and degree of vascularity. 1. Extensive scrotal soft tissue swelling either due to edema or cellulitis. No visualized findings of abscess or necrotizing fasciitis (Aubrie gangrene). 2. Normal sonographic appearance of testes and epididymides without torsion or epididymitis. XR CHEST PORTABLE    Result Date: 1/22/2022  EXAMINATION: ONE XRAY VIEW OF THE CHEST 1/22/2022 5:21 am COMPARISON: 01/17/2022 HISTORY: ORDERING SYSTEM PROVIDED HISTORY: reports shortness of breath TECHNOLOGIST PROVIDED HISTORY: Reason for exam:->reports shortness of breath Reason for Exam: reports shortness of breath FINDINGS: The cardiac silhouette is mildly enlarged. There is a left-sided cardiac device. The lungs are clear. No pleural effusion or pneumothorax. The visualized osseous structures are unremarkable. 1. Cardiomegaly, unchanged. XR CHEST PORTABLE    Result Date: 1/17/2022  EXAMINATION: ONE XRAY VIEW OF THE CHEST 1/17/2022 12:16 am COMPARISON: October 28, 2021 HISTORY: ORDERING SYSTEM PROVIDED HISTORY: dyspnea TECHNOLOGIST PROVIDED HISTORY: Reason for exam:->dyspnea Reason for Exam: dyspnea Additional signs and symptoms: dyspnea Relevant Medical/Surgical History: dyspnea FINDINGS: Left chest wall AICD in place. Cardiomediastinal silhouette is stable. The lungs show focal consolidation or pleural effusion. No pulmonary edema. No pneumothorax.  No acute osseous abnormality. 1. No acute cardiopulmonary disease. 2. Mild cardiomegaly. US DUP ABD PEL RETRO SCROT COMPLETE    Result Date: 1/24/2022  EXAMINATION: ULTRASOUND OF THE SCROTUM/TESTICLES WITH COLOR DOPPLER FLOW EVALUATION; DOPPLER EVALUATION OF THE PELVIS 1/24/2022 12:47 pm; 1/24/2022 12:48 pm TECHNIQUE: Duplex ultrasound using B-mode/gray scaled imaging, Doppler spectral analysis and color flow Doppler was obtained of the testicles.; Duplex ultrasound using B-mode/gray scaled imaging and Doppler spectral analysis and color flow was obtained of the pelvis. COMPARISON: Abdomen and pelvis CT 01/22/2022 HISTORY: ORDERING SYSTEM PROVIDED HISTORY: swelling pus foul discharge from penis TECHNOLOGIST PROVIDED HISTORY: Reason for exam:->swelling pus foul discharge from penis FINDINGS: Right testis:  4.0 cm x 2.6 cm x 3.2 cm Left testis:  3.8 cm x 2.6 cm x 2.8 cm RIGHT GRAYSCALE:  Normal size, echogenicity, and echotexture. No masses nor microlithiasis. DOPPLER:  Normal degree of vascularity. Normal arterial and venous waveforms. SCROTAL SAC:  No hydrocele nor evident varicocele. Diffuse thickening of the overlying soft tissues including the skin with interstitial fluid but no loculated fluid nor dirty shadowing suggestive of gas. EPIDIDYMIS:  Normal size and degree of vascularity. LEFT GRAYSCALE:  Normal size, echogenicity, and echotexture. No masses nor microlithiasis. DOPPLER:  Normal degree of vascularity. Normal arterial and venous waveforms. SCROTAL SAC:  Trace likely physiologic fluid with no overt hydrocele. No evident varicocele. Diffuse thickening of the overlying soft tissues including the skin with interstitial fluid with no loculated fluid nor dirty shadowing suggestive of gas. EPIDIDYMIS:  Normal size and degree of vascularity. 1. Extensive scrotal soft tissue swelling either due to edema or cellulitis. No visualized findings of abscess or necrotizing fasciitis (Aubrie gangrene).  2. Normal sonographic appearance of testes and epididymides without torsion or epididymitis. Assessment & Plan:      Deirdre Rai is a 70y.o. year old male admitted 1/22/2022 for acute on chronic heart failure, scrotal pain and swelling.    1) Scrotal Lymphedema: Improving slowly. No signs of infection on physical exam. Difficult to fully examine perirectal and perineal area. CT scan shows no gas/infection but did not go all the way down thru scrotum. Scrotal US 1/24/22: Extensive scrotal soft tissue swelling either due to edema or cellulitis. No visualized findings of abscess or necrotizing fasciitis (Aubrie gangrene). Normal sonographic appearance of testes and epididymides without torsion or epididymitis. Recommend elevation and ice. Treat underlying lymphedema. Nephrology on board. Will follow. Patient seen and examined, chart reviewed.      Electronically signed by Louie Gongora PA-C on 1/27/2022 at 2:43 PM

## 2022-01-27 NOTE — PROGRESS NOTES
Nephrology Progress Note  1/27/2022 9:27 AM        Subjective:   Admit Date: 1/22/2022  PCP: Arslan Milian    Interval History: Slowly improving    Diet: Reasonable    ROS: No shortness of breath  Urine output about 4.3 L/day  No fever    Data:     Current meds:    cefepime  2,000 mg IntraVENous Q24H    metOLazone  5 mg Oral Daily    spironolactone  12.5 mg Oral Daily    vancomycin (VANCOCIN) intermittent dosing (placeholder)   Other See Admin Instructions    metaxalone  400 mg Oral TID    insulin lispro  0-18 Units SubCUTAneous TID WC    morphine  2 mg IntraVENous Once    furosemide  80 mg IntraVENous 4x Daily    atorvastatin  40 mg Oral Nightly    carvedilol  3.125 mg Oral BID    clopidogrel  75 mg Oral Daily    tiotropium  2 puff Inhalation Daily    sodium chloride flush  5-40 mL IntraVENous 2 times per day    heparin (porcine)  5,000 Units SubCUTAneous 3 times per day    lisinopril  5 mg Oral Daily    insulin lispro  0-9 Units SubCUTAneous Nightly      sodium chloride 25 mL (01/26/22 1211)    dextrose           I/O last 3 completed shifts:  In: -   Out: 6160 [Urine:4930]    CBC:   Recent Labs     01/25/22  0605 01/26/22  0635 01/27/22  0337   WBC 9.1 8.9 8.7   HGB 8.3* 8.3* 8.3*    222 232          Recent Labs     01/25/22  0605 01/26/22  0635 01/27/22  0337    138 134*   K 4.8 5.1 4.6    100 98*   CO2 24 27 26   BUN 45* 51* 53*   CREATININE 2.4* 2.7* 2.7*   GLUCOSE 85 150* 97       Lab Results   Component Value Date    CALCIUM 7.8 (L) 01/27/2022    PHOS 3.8 01/27/2022       Objective:     Vitals: /70   Pulse 70   Temp 97.9 °F (36.6 °C) (Oral)   Resp 18   Ht 6' (1.829 m)   Wt 291 lb 0.1 oz (132 kg)   SpO2 95%   BMI 39.47 kg/m²     General appearance: Alert, awake and oriented  HEENT: Positive conjunctival pallor  Neck: Supple   Lungs:  Few rhonchi and no crackles  Heart: Seems regular rate and rhythm, left chest wall cardiac device  Abdomen: Soft, nontender on palpation  Extremities: Scrotal, penile and leg edema better-chronic leg infection  Does have a Fuller catheter      Problem List :         Impression :     1. Acute kidney injury with underlying CKD stage IV A1-stable so far-creatinine may go up-acceptable  2. Severe fluid overload-biventricular failure-more right heart failure than the left-he does have cardiomyopathy-slowly improving  3. Significant scrotal and penile edema-mainly from salt and water retention  4. Underlying atherosclerotic cardiovascular disease  5. Chronic soft tissue and skin infection  6. Blood pressure acceptable-multifactorial anemia    Recommendation/Plan  :     1. Keep all the diuretics  2. Once is manageable with oral diuretics perhaps in couple of days  3. Then will consider discharge low-salt, DASH diet  4. Follow clinically  5.  Keep ACE inhibitor's for now      Harvis Gitelman, MD MD

## 2022-01-28 LAB
ANION GAP SERPL CALCULATED.3IONS-SCNC: 10 MMOL/L (ref 4–16)
BASOPHILS ABSOLUTE: 0.1 K/CU MM
BASOPHILS RELATIVE PERCENT: 0.5 % (ref 0–1)
BUN BLDV-MCNC: 56 MG/DL (ref 6–23)
CALCIUM SERPL-MCNC: 7.7 MG/DL (ref 8.3–10.6)
CHLORIDE BLD-SCNC: 97 MMOL/L (ref 99–110)
CO2: 30 MMOL/L (ref 21–32)
CREAT SERPL-MCNC: 2.6 MG/DL (ref 0.9–1.3)
DIFFERENTIAL TYPE: ABNORMAL
EOSINOPHILS ABSOLUTE: 0.2 K/CU MM
EOSINOPHILS RELATIVE PERCENT: 1.9 % (ref 0–3)
GFR AFRICAN AMERICAN: 30 ML/MIN/1.73M2
GFR NON-AFRICAN AMERICAN: 24 ML/MIN/1.73M2
GLUCOSE BLD-MCNC: 142 MG/DL (ref 70–99)
GLUCOSE BLD-MCNC: 190 MG/DL (ref 70–99)
GLUCOSE BLD-MCNC: 225 MG/DL (ref 70–99)
GLUCOSE BLD-MCNC: 253 MG/DL (ref 70–99)
GLUCOSE BLD-MCNC: 258 MG/DL (ref 70–99)
HCT VFR BLD CALC: 27.4 % (ref 42–52)
HEMOGLOBIN: 8.2 GM/DL (ref 13.5–18)
IMMATURE NEUTROPHIL %: 0.4 % (ref 0–0.43)
LYMPHOCYTES ABSOLUTE: 1.1 K/CU MM
LYMPHOCYTES RELATIVE PERCENT: 11.7 % (ref 24–44)
MCH RBC QN AUTO: 25.7 PG (ref 27–31)
MCHC RBC AUTO-ENTMCNC: 29.9 % (ref 32–36)
MCV RBC AUTO: 85.9 FL (ref 78–100)
MONOCYTES ABSOLUTE: 1 K/CU MM
MONOCYTES RELATIVE PERCENT: 10.3 % (ref 0–4)
NUCLEATED RBC %: 0 %
PDW BLD-RTO: 20.2 % (ref 11.7–14.9)
PLATELET # BLD: 238 K/CU MM (ref 140–440)
PMV BLD AUTO: 9.7 FL (ref 7.5–11.1)
POTASSIUM SERPL-SCNC: 4.6 MMOL/L (ref 3.5–5.1)
RBC # BLD: 3.19 M/CU MM (ref 4.6–6.2)
SEGMENTED NEUTROPHILS ABSOLUTE COUNT: 6.9 K/CU MM
SEGMENTED NEUTROPHILS RELATIVE PERCENT: 75.2 % (ref 36–66)
SODIUM BLD-SCNC: 137 MMOL/L (ref 135–145)
TOTAL IMMATURE NEUTOROPHIL: 0.04 K/CU MM
TOTAL NUCLEATED RBC: 0 K/CU MM
WBC # BLD: 9.2 K/CU MM (ref 4–10.5)

## 2022-01-28 PROCEDURE — 6370000000 HC RX 637 (ALT 250 FOR IP): Performed by: INTERNAL MEDICINE

## 2022-01-28 PROCEDURE — 85025 COMPLETE CBC W/AUTO DIFF WBC: CPT

## 2022-01-28 PROCEDURE — 94640 AIRWAY INHALATION TREATMENT: CPT

## 2022-01-28 PROCEDURE — 1200000000 HC SEMI PRIVATE

## 2022-01-28 PROCEDURE — 6360000002 HC RX W HCPCS: Performed by: INTERNAL MEDICINE

## 2022-01-28 PROCEDURE — 6370000000 HC RX 637 (ALT 250 FOR IP): Performed by: PHYSICIAN ASSISTANT

## 2022-01-28 PROCEDURE — 36415 COLL VENOUS BLD VENIPUNCTURE: CPT

## 2022-01-28 PROCEDURE — 82962 GLUCOSE BLOOD TEST: CPT

## 2022-01-28 PROCEDURE — 80048 BASIC METABOLIC PNL TOTAL CA: CPT

## 2022-01-28 PROCEDURE — 2580000003 HC RX 258: Performed by: PHYSICIAN ASSISTANT

## 2022-01-28 PROCEDURE — 2700000000 HC OXYGEN THERAPY PER DAY

## 2022-01-28 PROCEDURE — 94664 DEMO&/EVAL PT USE INHALER: CPT

## 2022-01-28 PROCEDURE — 6360000002 HC RX W HCPCS: Performed by: PHYSICIAN ASSISTANT

## 2022-01-28 PROCEDURE — 94761 N-INVAS EAR/PLS OXIMETRY MLT: CPT

## 2022-01-28 PROCEDURE — 2580000003 HC RX 258: Performed by: INTERNAL MEDICINE

## 2022-01-28 RX ORDER — ERGOCALCIFEROL 1.25 MG/1
50000 CAPSULE ORAL WEEKLY
Status: DISCONTINUED | OUTPATIENT
Start: 2022-01-28 | End: 2022-02-02 | Stop reason: HOSPADM

## 2022-01-28 RX ADMIN — FUROSEMIDE 80 MG: 10 INJECTION, SOLUTION INTRAMUSCULAR; INTRAVENOUS at 13:27

## 2022-01-28 RX ADMIN — CARVEDILOL 3.12 MG: 6.25 TABLET, FILM COATED ORAL at 21:06

## 2022-01-28 RX ADMIN — CLOPIDOGREL 75 MG: 75 TABLET, FILM COATED ORAL at 09:42

## 2022-01-28 RX ADMIN — FUROSEMIDE 80 MG: 10 INJECTION, SOLUTION INTRAMUSCULAR; INTRAVENOUS at 23:51

## 2022-01-28 RX ADMIN — ERGOCALCIFEROL 50000 UNITS: 1.25 CAPSULE ORAL at 13:27

## 2022-01-28 RX ADMIN — OXYCODONE HYDROCHLORIDE 7.5 MG: 5 TABLET ORAL at 15:13

## 2022-01-28 RX ADMIN — LISINOPRIL 5 MG: 5 TABLET ORAL at 09:42

## 2022-01-28 RX ADMIN — OXYCODONE HYDROCHLORIDE 7.5 MG: 5 TABLET ORAL at 21:07

## 2022-01-28 RX ADMIN — OXYCODONE HYDROCHLORIDE 7.5 MG: 5 TABLET ORAL at 05:43

## 2022-01-28 RX ADMIN — MORPHINE SULFATE 2 MG: 2 INJECTION, SOLUTION INTRAMUSCULAR; INTRAVENOUS at 23:43

## 2022-01-28 RX ADMIN — FUROSEMIDE 80 MG: 10 INJECTION, SOLUTION INTRAMUSCULAR; INTRAVENOUS at 19:25

## 2022-01-28 RX ADMIN — HEPARIN SODIUM 5000 UNITS: 5000 INJECTION INTRAVENOUS; SUBCUTANEOUS at 21:09

## 2022-01-28 RX ADMIN — TIOTROPIUM BROMIDE INHALATION SPRAY 2 PUFF: 3.12 SPRAY, METERED RESPIRATORY (INHALATION) at 09:16

## 2022-01-28 RX ADMIN — HEPARIN SODIUM 5000 UNITS: 5000 INJECTION INTRAVENOUS; SUBCUTANEOUS at 05:43

## 2022-01-28 RX ADMIN — SODIUM CHLORIDE, PRESERVATIVE FREE 10 ML: 5 INJECTION INTRAVENOUS at 21:13

## 2022-01-28 RX ADMIN — SODIUM CHLORIDE, PRESERVATIVE FREE 10 ML: 5 INJECTION INTRAVENOUS at 10:00

## 2022-01-28 RX ADMIN — FUROSEMIDE 80 MG: 10 INJECTION, SOLUTION INTRAMUSCULAR; INTRAVENOUS at 08:33

## 2022-01-28 RX ADMIN — OXYCODONE HYDROCHLORIDE 7.5 MG: 5 TABLET ORAL at 02:24

## 2022-01-28 RX ADMIN — HEPARIN SODIUM 5000 UNITS: 5000 INJECTION INTRAVENOUS; SUBCUTANEOUS at 14:06

## 2022-01-28 RX ADMIN — SPIRONOLACTONE 12.5 MG: 25 TABLET ORAL at 09:42

## 2022-01-28 RX ADMIN — CARVEDILOL 3.12 MG: 6.25 TABLET, FILM COATED ORAL at 09:42

## 2022-01-28 RX ADMIN — ATORVASTATIN CALCIUM 40 MG: 40 TABLET, FILM COATED ORAL at 21:08

## 2022-01-28 RX ADMIN — METOLAZONE 5 MG: 5 TABLET ORAL at 09:42

## 2022-01-28 RX ADMIN — CEFEPIME HYDROCHLORIDE 2000 MG: 2 INJECTION, POWDER, FOR SOLUTION INTRAVENOUS at 12:20

## 2022-01-28 RX ADMIN — MORPHINE SULFATE 2 MG: 2 INJECTION, SOLUTION INTRAMUSCULAR; INTRAVENOUS at 03:44

## 2022-01-28 ASSESSMENT — PAIN - FUNCTIONAL ASSESSMENT: PAIN_FUNCTIONAL_ASSESSMENT: ACTIVITIES ARE NOT PREVENTED

## 2022-01-28 ASSESSMENT — PAIN DESCRIPTION - LOCATION
LOCATION: ANKLE;FOOT
LOCATION: ANKLE;FOOT

## 2022-01-28 ASSESSMENT — PAIN SCALES - GENERAL
PAINLEVEL_OUTOF10: 9
PAINLEVEL_OUTOF10: 8
PAINLEVEL_OUTOF10: 10
PAINLEVEL_OUTOF10: 7
PAINLEVEL_OUTOF10: 10
PAINLEVEL_OUTOF10: 7

## 2022-01-28 ASSESSMENT — PAIN DESCRIPTION - ORIENTATION: ORIENTATION: RIGHT

## 2022-01-28 ASSESSMENT — PAIN DESCRIPTION - PROGRESSION: CLINICAL_PROGRESSION: NOT CHANGED

## 2022-01-28 ASSESSMENT — PAIN DESCRIPTION - DESCRIPTORS: DESCRIPTORS: ACHING

## 2022-01-28 ASSESSMENT — PAIN DESCRIPTION - ONSET: ONSET: ON-GOING

## 2022-01-28 ASSESSMENT — PAIN DESCRIPTION - PAIN TYPE
TYPE: ACUTE PAIN;CHRONIC PAIN
TYPE: ACUTE PAIN;CHRONIC PAIN

## 2022-01-28 ASSESSMENT — PAIN DESCRIPTION - FREQUENCY: FREQUENCY: CONTINUOUS

## 2022-01-28 NOTE — PROGRESS NOTES
cardiac device which is nontender  Abdomen: Soft, nontender  Extremities: Lower extremity edema, penile scrotal edema is better  He does have a Fuller      Problem List :         Impression :     1. Acute kidney injury-with underlying CKD stage IV A1-acceptable diuresis-stable  2. Severe fluid overload-biventricular failure with scrotal penile and lower extremity edema-with underlying severe ischemic cardiomyopathy-  3. Atherosclerotic cardiovascular disease  4. Severe peripheral arterial disease involving lower leg with chronic wound   5. Hypertension-should go down with salt in order removal    Recommendation/Plan  :     1. Keep all the diuretics during the weekend  2. My guess is ,we should be able to discharge him on Monday with oral diuretics  3. Okay to keep small dose of ACE inhibitor's  4. Revisit antibiotic  5. Watch for iatrogenic and iatrogenic complication  6.  Follow clinically and biochemically      Kacy Chambers MD MD

## 2022-01-28 NOTE — PROGRESS NOTES
[address] [facility]  consulted by Dr. Irasema Gloria for monitoring and adjustment. Indication for treatment: SSTI  Goal trough: 10-15 mcg/mL  AUC/RJ:  <500    Pertinent Laboratory Values:   Temp Readings from Last 3 Encounters:   01/28/22 97.1 °F (36.2 °C) (Oral)   01/16/22 97 °F (36.1 °C) (Oral)   01/06/22 98.3 °F (36.8 °C) (Temporal)     Recent Labs     01/26/22  0635 01/27/22  0337 01/28/22  0338   WBC 8.9 8.7 9.2     Recent Labs     01/26/22  0635 01/27/22  0337 01/28/22 0338   BUN 51* 53* 56*   CREATININE 2.7* 2.7* 2.6*     Estimated Creatinine Clearance: 37 mL/min (A) (based on SCr of 2.6 mg/dL (H)). Intake/Output Summary (Last 24 hours) at 1/28/2022 1638  Last data filed at 1/28/2022 0235  Gross per 24 hour   Intake --   Output 2350 ml   Net -2350 ml     Pertinent Cultures:  Date    Source    Results  1/23   Wound    MRSA    Vancomycin level:   TROUGH:  No results for input(s): VANCOTROUGH in the last 72 hours. RANDOM:    Recent Labs     01/26/22  0635 01/27/22 0337   VANCORANDOM 24.2 17.3       Assessment:  · SCr, BUN, and urine output: NANCY on CKD, SCR slightly improved at 2.6  · Day(s) of therapy: 6  · Vancomycin concentration:   · 1/26 - 24.2  · 1/27 - 17.3, 29 hour level post 1250mg dose    Plan:  · Intermittent vancomycin dosing with CKD  · Vanco level @ 17.3 yesterday am, 1000mg dose given  · Repeat the vanco level tomorrow am  · Pharmacy will continue to monitor patient and adjust therapy as indicated    VANCOMYCIN CONCENTRATION SCHEDULED FOR 1/29 @ 0600    Thank you for the consult,  Duane Sebastian.  Franchesca Trinh, [facility]  1/28/2022 4:38 PM

## 2022-01-28 NOTE — PROGRESS NOTES
Comprehensive Nutrition Assessment    Type and Reason for Visit:  Reassess    Nutrition Recommendations/Plan:   · Continue current diet  · Encourage protein containing snacks    Nutrition Assessment:  Pt continues to eat well here, feeding self and consuming generally greater than half to all of meals. Denies need for oral supplement. No recent wt loss noted, some gain, diuretics ordered. Will continue to follow as moderate nutrition risk. Malnutrition Assessment:  Malnutrition Status:  No malnutrition    Context:  Chronic Illness       Estimated Daily Nutrient Needs:  Energy (kcal):  2018-2422 ([place] 1898); Weight Used for Energy Requirements:  Current     Protein (g):   (1.1-1.4 g/kg IBW); Weight Used for Protein Requirements:  Ideal        Fluid (ml/day):  fluids per MD; Method Used for Fluid Requirements:  Other (Comment)      Nutrition Related Findings:  Glu 142-225      Wounds:  Multiple,Diabetic Ulcer,Open Wounds       Current Nutrition Therapies:    ADULT DIET; Regular; 5 carb choices (75 gm/meal);  Low Sodium (2 gm)    Anthropometric Measures:  · Height: 6' (182.9 cm)  · Current Body Weight: 291 lb (132 kg)   · Admission Body Weight:  (stated)    · Usual Body Weight: 282 lb 3 oz (128 kg) (6/7/21)     · Ideal Body Weight: 178 lbs; % Ideal Body Weight 151.7 %   · BMI: 39.5  · BMI Categories: Obese Class 2 (BMI 35.0 -39.9)       Nutrition Diagnosis:   · Predicted inadequate energy intake related to cardiac dysfunction,increase demand for energy/nutrients as evidenced by localized or generalized fluid accumulation,wounds    Nutrition Interventions:   Food and/or Nutrient Delivery:  Continue Current Diet,Snacks (Comment)  Nutrition Education/Counseling:  Education declined   Coordination of Nutrition Care:  Continue to monitor while inpatient    Goals:  Pt will consume at least 75% of meals with better glucose control       Nutrition Monitoring and Evaluation:   Behavioral-Environmental Outcomes:  None Identified   Food/Nutrient Intake Outcomes:  Diet Advancement/Tolerance,Food and Nutrient Intake  Physical Signs/Symptoms Outcomes:  Biochemical Data,GI Status,Weight,Meal Time Behavior,Skin,Fluid Status or Edema (+3, +4 edema)     Discharge Planning:     Too soon to determine     Electronically signed by Saadia Hubbard RD, LD on 1/28/22 at 2:31 PM EST    Contact: [phone]

## 2022-01-28 NOTE — PROGRESS NOTES
[facility], [address]   Progress Note  [address] 1 2      Date: 2022   Patient: Santos Kelly   : 1950   DOA: 2022   MRN: [id number]   ROOM#: [id number]-[id number]     Admit Date: 2022     Collaborating Urologist on Call at time of admission: Dr. Sherry Rain  CC: \"My balls hurt\"  Reason for Consult:  Scrotal pain and swelling    Subjective:     Pain: mild, no nausea and no vomiting,   Bowel Movement/Flatus:   Yes  Voidinfr parkinson catheter, urine clear yellow     Pt resting in bed, continues to endorse improvement in his scrotal pain/swelling. Objective:    Vitals:    /62   Pulse 63   Temp 97.7 °F (36.5 °C)   Resp 17   Ht 6' (1.829 m)   Wt 291 lb (132 kg)   SpO2 91%   BMI 39.47 kg/m²    Temp  Av °F (36.7 °C)  Min: 97.7 °F (36.5 °C)  Max: 98.2 °F (36.8 °C)       Intake/Output Summary (Last 24 hours) at 2022 1008  Last data filed at 2022 0235  Gross per 24 hour   Intake --   Output 2350 ml   Net -2350 ml       Physical Exam:   General appearance: alert, appears stated age, cooperative, no distress and moderately obese  Head: Normocephalic, without obvious abnormality, atraumatic  Back: No CVA tenderness  Abdomen: Soft, non-tender, mildly distended  Male genitalia: Diffuse penile/scrotal swelling with no crepitus, fluctuance, drainage, Aubrie's, or signs of infection noted. 16fr parkinson catheter in place, urine clear yellow.     Labs:   WBC:    Lab Results   Component Value Date    WBC 9.2 2022      Hemoglobin/Hematocrit:    Lab Results   Component Value Date    HGB 8.2 2022    HCT 27.4 2022      BMP:   Lab Results   Component Value Date     2022    K 4.6 2022    K 5.1 01/10/2018    CL 97 2022    CO2 30 2022    BUN 56 2022    LABALBU 2.8 2022    CREATININE 2.6 2022    CALCIUM 7.7 2022    GFRAA 30 2022    LABGLOM 24 2022      PT/INR:    Lab Results   Component Value Date PROTIME 13.3 08/30/2021    PROTIME 11.2 09/08/2011    INR 1.03 08/30/2021      PTT:    Lab Results   Component Value Date    APTT 41.7 08/30/2021     Imaging:  Echocardiogram complete 2D with doppler with color    Result Date: 1/24/2022  Transthoracic Echocardiography Report (TTE)  Demographics   Patient Name        Raphael Webb Date of Study          01/24/2022   Date of Birth       1950   Gender                 Male   Age                 70 year(s)   Race                   Black   Patient Number      [id number]   Room Number            4120   Visit Number        [id number]   Corporate ID        [id number]   Accession Number    [id number]   [facility]   Ordering Physician               Interpreting Physician Wilfred Ramirez MD  Procedure Type of Study   TTE procedure:ECHOCARDIOGRAM COMPLETE 2D W DOPPLER W COLOR. Procedure Date Date: 01/24/2022 Start: 08:03 AM Study Location: Portable Technical Quality: Fair visualization Indications:Congestive heart failure. Patient Status: Routine Height: 72 inches Weight: 270 pounds BSA: 2.42 m2 BMI: 36.62 kg/m2 HR: 63 bpm BP: 126/61 mmHg  Conclusions   Summary  Left ventricular systolic function is normal.  Ejection fraction is visually estimated at 50%. Moderate left ventricular hypertrophy. Septal flattening due to RVVO and pressure overload. PPM wiring visualized in right heart. Mildly dilated right atrium. Severely dilated right ventricle cavity. Severe tricuspid regurgitation; RVSP: 75 mmHg. No evidence of any pericardial effusion. Right pleural effusion. Dilated aortic root (4.0 cm). Inferior vena cava is dilated, measuring at 2.3 cm, and does not collapse  with respiration.    Signature   ------------------------------------------------------------------  Electronically signed by Wilfred Ramirez MD (Interpreting  physician) on 01/24/2022 at 03:32 PM  ------------------------------------------------------------------   Findings Left Ventricle  Left ventricular systolic function is normal.  Ejection fraction is visually estimated at 50%. Left ventricle size is normal.  Moderate left ventricular hypertrophy. Grade I diastolic dysfunction. Septal flattening due to RVVO and pressure overload. Left Atrium  Essentially normal left atrium. Right Atrium  PPM wiring visualized in right atrium. Mildly dilated right atrium. Right Ventricle  Pacer Wire visualized in right ventricle. Severely dilated right ventricle cavity. Aortic Valve  Sclerotic, but non-stenotic aortic valve. Mitral Valve  Trace mitral regurgitation. Tricuspid Valve  Severe tricuspid regurgitation; RVSP: 75 mmHg. Pulmonic Valve  The pulmonic valve was not well visualized. Pericardial Effusion  No evidence of any pericardial effusion. Pleural Effusion  Right pleural effusion. Miscellaneous  Dilated aortic root (4.0 cm). Inferior vena cava is dilated, measuring at 2.3 cm, and does not collapse  with respiration. The aorta is within normal limits.   M-Mode/2D Measurements & Calculations   LV Diastolic Dimension:  LV Systolic Dimension:  LA Dimension: 3.5 cmAO Root  4.92 cm                  2.96 cm                 Dimension: 4 cmLA Area:  LV FS:39.8 %             LV Volume Diastolic:    93.1 cm2  LV PW Diastolic: 3.36 cm 705 ml  LV PW Systolic: 2.3 cm   LV Volume Systolic: 50  Septum Diastolic: 3.29   ml  cm                       LV EDV/LV EDV Index:    RV Diastolic Dimension:  Septum Systolic: 2.4 cm  959 FL/90 m2LV ESV/LV   4.33 cm  CO: 3.03 l/min           ESV Index: 50 ml/21 m2  CI: 1.25 l/m*m2          EF Calculated (A4C):    LA/Aorta: 0.88                           54.1 %  LV Area Diastolic: 23.4  EF Calculated (2D):     LA volume/Index: 52 ml  cm2                      70.3 %                  /54Y5  LV Area Systolic: 62.3  cm2                      LV Length: 8.66 cm                            LVOT: 2 cm  Doppler Measurements & Calculations   MV Peak E-Wave: 86.4 AV Peak Velocity: 103 cm/s    LVOT Peak Velocity: 79.9  cm/s                 AV Peak Gradient: 4.24 mmHg   cm/s  MV Peak A-Wave: 66.5 AV Mean Velocity: 74.7 cm/s   LVOT Mean Velocity: 54.4  cm/s                 AV Mean Gradient: 2 mmHg      cm/s  MV E/A Ratio: 1.3    AV VTI: 20.8 cm               LVOT Peak Gradient: 3  MV Peak Gradient:    AV Area (Continuity):2.31 cm2 mmHgLVOT Mean Gradient: 1  2.99 mmHg                                          mmHg                       LVOT VTI: 15.3 cm             Estimated RVSP: 75 mmHg  MV P1/2t: 60 msec                                  Estimated RAP:3 mmHg  MVA by PHT:3.67 cm2  Estimated PASP: 75.25 mmHg   MV E' Septal                                       TR Velocity:425 cm/s  Velocity: 6.44 cm/s                                TR Gradient:72.25 mmHg  MV E' Lateral  Velocity: 6.76 cm/s  MV E/E' septal:  13.42  MV E/E' lateral:  12.78      CT ABDOMEN PELVIS WO CONTRAST Additional Contrast? None    Result Date: 1/22/2022  EXAMINATION: CT OF THE ABDOMEN AND PELVIS WITHOUT CONTRAST 1/22/2022 4:00 am TECHNIQUE: CT of the abdomen and pelvis was performed without the administration of intravenous contrast. Multiplanar reformatted images are provided for review. Dose modulation, iterative reconstruction, and/or weight based adjustment of the mA/kV was utilized to reduce the radiation dose to as low as reasonably achievable. COMPARISON: None. HISTORY: ORDERING SYSTEM PROVIDED HISTORY: reports scrotal drainage; concern for signs of infection TECHNOLOGIST PROVIDED HISTORY: Reason for exam:->reports scrotal drainage; concern for signs of infection Additional Contrast?->None Decision Support Exception - unselect if not a suspected or confirmed emergency medical condition->Emergency Medical Condition (MA) Reason for Exam: reports scrotal drainage; concern for signs of infection FINDINGS: Lower Chest: Small right-sided pleural effusion with adjacent atelectasis is noted. Organs: Cholelithiasis is present. The liver, spleen, adrenal glands, pancreas, and kidneys are unremarkable. GI/Bowel: There is no bowel obstruction. Postsurgical changes are seen to the colon. Pelvis: The bladder is unremarkable. There is no free fluid. Peritoneum/Retroperitoneum: There is no free air. Trace fluid is seen within the abdomen. Bones/Soft Tissues: Diffuse body wall edema is noted. There is no evidence of soft tissue gas. No destructive osseous lesions are identified. The scrotum is not completely visualized. 1. Diffuse body wall edema is seen. There is no evidence of soft tissue gas. Scrotum is not completely visualized. 2. Small right pleural effusion with adjacent atelectasis. 3. Cholelithiasis. 4. Trace ascites within the abdomen. US SCROTUM AND TESTICLES    Result Date: 1/24/2022  EXAMINATION: ULTRASOUND OF THE SCROTUM/TESTICLES WITH COLOR DOPPLER FLOW EVALUATION; DOPPLER EVALUATION OF THE PELVIS 1/24/2022 12:47 pm; 1/24/2022 12:48 pm TECHNIQUE: Duplex ultrasound using B-mode/gray scaled imaging, Doppler spectral analysis and color flow Doppler was obtained of the testicles.; Duplex ultrasound using B-mode/gray scaled imaging and Doppler spectral analysis and color flow was obtained of the pelvis. COMPARISON: Abdomen and pelvis CT 01/22/2022 HISTORY: ORDERING SYSTEM PROVIDED HISTORY: swelling pus foul discharge from penis TECHNOLOGIST PROVIDED HISTORY: Reason for exam:->swelling pus foul discharge from penis FINDINGS: Right testis:  4.0 cm x 2.6 cm x 3.2 cm Left testis:  3.8 cm x 2.6 cm x 2.8 cm RIGHT GRAYSCALE:  Normal size, echogenicity, and echotexture. No masses nor microlithiasis. DOPPLER:  Normal degree of vascularity. Normal arterial and venous waveforms. SCROTAL SAC:  No hydrocele nor evident varicocele. Diffuse thickening of the overlying soft tissues including the skin with interstitial fluid but no loculated fluid nor dirty shadowing suggestive of gas.  EPIDIDYMIS: Normal size and degree of vascularity. LEFT GRAYSCALE:  Normal size, echogenicity, and echotexture. No masses nor microlithiasis. DOPPLER:  Normal degree of vascularity. Normal arterial and venous waveforms. SCROTAL SAC:  Trace likely physiologic fluid with no overt hydrocele. No evident varicocele. Diffuse thickening of the overlying soft tissues including the skin with interstitial fluid with no loculated fluid nor dirty shadowing suggestive of gas. EPIDIDYMIS:  Normal size and degree of vascularity. 1. Extensive scrotal soft tissue swelling either due to edema or cellulitis. No visualized findings of abscess or necrotizing fasciitis (Aubrie gangrene). 2. Normal sonographic appearance of testes and epididymides without torsion or epididymitis. XR CHEST PORTABLE    Result Date: 1/22/2022  EXAMINATION: ONE XRAY VIEW OF THE CHEST 1/22/2022 5:21 am COMPARISON: 01/17/2022 HISTORY: ORDERING SYSTEM PROVIDED HISTORY: reports shortness of breath TECHNOLOGIST PROVIDED HISTORY: Reason for exam:->reports shortness of breath Reason for Exam: reports shortness of breath FINDINGS: The cardiac silhouette is mildly enlarged. There is a left-sided cardiac device. The lungs are clear. No pleural effusion or pneumothorax. The visualized osseous structures are unremarkable. 1. Cardiomegaly, unchanged. XR CHEST PORTABLE    Result Date: 1/17/2022  EXAMINATION: ONE XRAY VIEW OF THE CHEST 1/17/2022 12:16 am COMPARISON: October 28, 2021 HISTORY: ORDERING SYSTEM PROVIDED HISTORY: dyspnea TECHNOLOGIST PROVIDED HISTORY: Reason for exam:->dyspnea Reason for Exam: dyspnea Additional signs and symptoms: dyspnea Relevant Medical/Surgical History: dyspnea FINDINGS: Left chest wall AICD in place. Cardiomediastinal silhouette is stable. The lungs show focal consolidation or pleural effusion. No pulmonary edema. No pneumothorax. No acute osseous abnormality. 1. No acute cardiopulmonary disease. 2. Mild cardiomegaly. US DUP ABD PEL RETRO SCROT COMPLETE    Result Date: 1/24/2022  EXAMINATION: ULTRASOUND OF THE SCROTUM/TESTICLES WITH COLOR DOPPLER FLOW EVALUATION; DOPPLER EVALUATION OF THE PELVIS 1/24/2022 12:47 pm; 1/24/2022 12:48 pm TECHNIQUE: Duplex ultrasound using B-mode/gray scaled imaging, Doppler spectral analysis and color flow Doppler was obtained of the testicles.; Duplex ultrasound using B-mode/gray scaled imaging and Doppler spectral analysis and color flow was obtained of the pelvis. COMPARISON: Abdomen and pelvis CT 01/22/2022 HISTORY: ORDERING SYSTEM PROVIDED HISTORY: swelling pus foul discharge from penis TECHNOLOGIST PROVIDED HISTORY: Reason for exam:->swelling pus foul discharge from penis FINDINGS: Right testis:  4.0 cm x 2.6 cm x 3.2 cm Left testis:  3.8 cm x 2.6 cm x 2.8 cm RIGHT GRAYSCALE:  Normal size, echogenicity, and echotexture. No masses nor microlithiasis. DOPPLER:  Normal degree of vascularity. Normal arterial and venous waveforms. SCROTAL SAC:  No hydrocele nor evident varicocele. Diffuse thickening of the overlying soft tissues including the skin with interstitial fluid but no loculated fluid nor dirty shadowing suggestive of gas. EPIDIDYMIS:  Normal size and degree of vascularity. LEFT GRAYSCALE:  Normal size, echogenicity, and echotexture. No masses nor microlithiasis. DOPPLER:  Normal degree of vascularity. Normal arterial and venous waveforms. SCROTAL SAC:  Trace likely physiologic fluid with no overt hydrocele. No evident varicocele. Diffuse thickening of the overlying soft tissues including the skin with interstitial fluid with no loculated fluid nor dirty shadowing suggestive of gas. EPIDIDYMIS:  Normal size and degree of vascularity. 1. Extensive scrotal soft tissue swelling either due to edema or cellulitis. No visualized findings of abscess or necrotizing fasciitis (Aubrie gangrene).  2. Normal sonographic appearance of testes and epididymides without torsion or epididymitis. Assessment & Plan:      Javed Eddy is a 70y.o. year old male admitted 1/22/2022 for acute on chronic heart failure, scrotal pain and swelling.    1) Scrotal/Penile Lymphedema: Improving slowly. No signs of infection on physical exam. Difficult to fully examine perirectal and perineal area. CT scan shows no gas/infection but did not go all the way down thru scrotum. Scrotal US 1/24/22: Extensive scrotal soft tissue swelling either due to edema or cellulitis. No visualized findings of abscess or necrotizing fasciitis (Aubrie gangrene). Normal sonographic appearance of testes and epididymides without torsion or epididymitis. Recommend elevation and ice. Treat underlying lymphedema. Nephrology on board. Recommend parkinson removal/voiding trial prior to discharge. Pt stable from a  standpoint. Will sign off, please call with any questions. Pt to follow up in our office in 2 weeks for reevaluation. He would like us to contact [employer] ([phone]) to arrange follow up appointment. Patient seen and examined, chart reviewed.      Electronically signed by Anitra Pizarro PA-C on 1/28/2022 at 10:08 AM

## 2022-01-28 NOTE — PROGRESS NOTES
Hospitalist Progress Note      Name:  Rossy Campbell /Age/Sex: 1950  (70 y.o. male)   MRN & CSN:  [id number] & [id number] Admission Date/Time: 2022  3:40 AM   [facility]- PCP: Mary Garrett Day: 7    Assessment and Plan:       70 y.o.  male with PMH of DM 2, HFrEF/right-sided CHF, HTN, HLD, SSS, s/p PCM and diabetic neuropathy admitted on 2022 with complaints of increasing lower extremity edema associated with orthopnea with findings of acute on chronic HFrEF along with scrotal lymphedema.     Acute on chronic biventricular decompensated heart failure  Elevated troponin  -Edema significantly improved since admission  -Echo  with preserved EF; severely dilated right ventricle with severe tricuspid regurgitation RVSP of 75   continue 80 IV Lasix 4 times a day  -Metolazone added today  -Continue spironolactone  Cardiologist on board  Nephrologist on board  Monitor renal function and serum electrolytes      Scrotal lymphedema  Penile and scrotal purulence  -Edema improving daily  -No further purulence  -Scrotal cultures growing MRSA  -Renal ultrasound with extensive soft tissue swelling; no visualized abscess noted  -Urology following we will continue elevation and ice  -Continue IV antibiotics cefepime and vancomycin     History of CKD stage IV  -Nephrology following for diuretic regimen  -Creatinine slight increase from  however currently stable in setting of diuretic therapy for above  -Appreciate nephrology recommendations  Serum creatinine today 2.6     Chronic medical conditions: Home medications resumed was contraindicated  Hypertension  Diabetes mellitus type 2  Sick sinus syndrome status post pacer  PAD  Diabetic foot wounds  Status post right-sided crepitation  Chronic normocytic anemia     Reason for continued hospitalization: Continue IV diuresis and iv AB     Diet ADULT DIET; Regular; 5 carb choices (75 gm/meal);  Low Sodium (2 gm)   DVT Prophylaxis [] Lovenox, [x]  Heparin, [] SCDs, [] Ambulation   GI Prophylaxis [] PPI,  [] H2 Blocker,  [] Carafate,  [] Diet/Tube Feeds   Code Status Full Code   Disposition Patient requires continued admission due to    MDM [] Low, [] Moderate,[]  High  Patient's risk as above due to      History of Present Illness: The patient was seen and examined at the bedside. Patient denies shortness of breath or chest pain   PT OT evaluation ordered      Objective: Intake/Output Summary (Last 24 hours) at 1/28/2022 1116  Last data filed at 1/28/2022 0235  Gross per 24 hour   Intake --   Output 2350 ml   Net -2350 ml      Vitals:   Vitals:    01/28/22 0940   BP: 131/62   Pulse: 63   Resp:    Temp: 97.7 °F (36.5 °C)   SpO2: 91%     Physical Exam:   GEN Awake.  Alert , not in respiratory distress, not in pain  HEENT: PEERLA, , supple neck,   Chest: air entry equal bilaterally, no wheezing or crepitation  Heart: S1 and S2 heard, no murmur, no gallop or rub, regular rate  Abdomen: soft, ND , Nt, +BS  Extremities: no cyanosis, tenderness or erythema, peripheral pulses audible, scrotal lymphedema with nonpitting swelling of both leg  Neurology: alert, oriented x3, able to move 4 limbs    Medications:   Medications:    tiotropium  2 puff Inhalation Nightly    cefepime  2,000 mg IntraVENous Q24H    metOLazone  5 mg Oral Daily    spironolactone  12.5 mg Oral Daily    vancomycin (VANCOCIN) intermittent dosing (placeholder)   Other See Admin Instructions    metaxalone  400 mg Oral TID    insulin lispro  0-18 Units SubCUTAneous TID WC    morphine  2 mg IntraVENous Once    furosemide  80 mg IntraVENous 4x Daily    atorvastatin  40 mg Oral Nightly    carvedilol  3.125 mg Oral BID    clopidogrel  75 mg Oral Daily    sodium chloride flush  5-40 mL IntraVENous 2 times per day    heparin (porcine)  5,000 Units SubCUTAneous 3 times per day    lisinopril  5 mg Oral Daily    insulin lispro  0-9 Units SubCUTAneous Nightly Infusions:    sodium chloride 25 mL (01/26/22 1211)    dextrose       PRN Meds: oxyCODONE, 7.5 mg, Q4H PRN  albuterol sulfate HFA, 2 puff, Q4H PRN  sodium chloride flush, 5-40 mL, PRN  sodium chloride, 25 mL, PRN  ondansetron, 4 mg, Q8H PRN   Or  ondansetron, 4 mg, Q6H PRN  polyethylene glycol, 17 g, Daily PRN  acetaminophen, 650 mg, Q6H PRN   Or  acetaminophen, 650 mg, Q6H PRN  glucose, 15 g, PRN  glucagon (rDNA), 1 mg, PRN  dextrose, 100 mL/hr, PRN  acetaminophen, 650 mg, Q6H PRN  ondansetron, 4 mg, Q6H PRN  benzonatate, 100 mg, TID PRN  melatonin, 3 mg, Nightly PRN  ALPRAZolam, 0.25 mg, Nightly PRN  aluminum & magnesium hydroxide-simethicone, 30 mL, Q6H PRN  morphine, 2 mg, Q4H PRN  dextrose bolus (hypoglycemia), 125 mL, PRN   Or  dextrose bolus (hypoglycemia), 250 mL, PRN          Electronically signed by Patience Rob MD on 1/28/2022 at 11:16 AM

## 2022-01-29 LAB
ALBUMIN SERPL-MCNC: 2.5 GM/DL (ref 3.4–5)
ALP BLD-CCNC: 129 IU/L (ref 40–129)
ALT SERPL-CCNC: 16 U/L (ref 10–40)
ANION GAP SERPL CALCULATED.3IONS-SCNC: 11 MMOL/L (ref 4–16)
AST SERPL-CCNC: 25 IU/L (ref 15–37)
BASOPHILS ABSOLUTE: 0 K/CU MM
BASOPHILS RELATIVE PERCENT: 0.3 % (ref 0–1)
BILIRUB SERPL-MCNC: 1 MG/DL (ref 0–1)
BUN BLDV-MCNC: 55 MG/DL (ref 6–23)
CALCIUM SERPL-MCNC: 7.7 MG/DL (ref 8.3–10.6)
CHLORIDE BLD-SCNC: 97 MMOL/L (ref 99–110)
CO2: 31 MMOL/L (ref 21–32)
CREAT SERPL-MCNC: 2.7 MG/DL (ref 0.9–1.3)
DIFFERENTIAL TYPE: ABNORMAL
DOSE AMOUNT: NORMAL
DOSE TIME: NORMAL
EOSINOPHILS ABSOLUTE: 0.2 K/CU MM
EOSINOPHILS RELATIVE PERCENT: 1.6 % (ref 0–3)
ESTIMATED AVERAGE GLUCOSE: 183 MG/DL
GFR AFRICAN AMERICAN: 28 ML/MIN/1.73M2
GFR NON-AFRICAN AMERICAN: 23 ML/MIN/1.73M2
GLUCOSE BLD-MCNC: 122 MG/DL (ref 70–99)
GLUCOSE BLD-MCNC: 186 MG/DL (ref 70–99)
GLUCOSE BLD-MCNC: 190 MG/DL (ref 70–99)
GLUCOSE BLD-MCNC: 49 MG/DL (ref 70–99)
GLUCOSE BLD-MCNC: 99 MG/DL (ref 70–99)
HBA1C MFR BLD: 8 % (ref 4.2–6.3)
HCT VFR BLD CALC: 28.7 % (ref 42–52)
HEMOGLOBIN: 8.5 GM/DL (ref 13.5–18)
IMMATURE NEUTROPHIL %: 0.3 % (ref 0–0.43)
LYMPHOCYTES ABSOLUTE: 0.7 K/CU MM
LYMPHOCYTES RELATIVE PERCENT: 7.8 % (ref 24–44)
MCH RBC QN AUTO: 25.4 PG (ref 27–31)
MCHC RBC AUTO-ENTMCNC: 29.6 % (ref 32–36)
MCV RBC AUTO: 85.7 FL (ref 78–100)
MONOCYTES ABSOLUTE: 0.9 K/CU MM
MONOCYTES RELATIVE PERCENT: 10.2 % (ref 0–4)
NUCLEATED RBC %: 0 %
PDW BLD-RTO: 20.4 % (ref 11.7–14.9)
PLATELET # BLD: 263 K/CU MM (ref 140–440)
PMV BLD AUTO: 9.9 FL (ref 7.5–11.1)
POTASSIUM SERPL-SCNC: 4.8 MMOL/L (ref 3.5–5.1)
RBC # BLD: 3.35 M/CU MM (ref 4.6–6.2)
SEGMENTED NEUTROPHILS ABSOLUTE COUNT: 7.3 K/CU MM
SEGMENTED NEUTROPHILS RELATIVE PERCENT: 79.8 % (ref 36–66)
SODIUM BLD-SCNC: 139 MMOL/L (ref 135–145)
TOTAL IMMATURE NEUTOROPHIL: 0.03 K/CU MM
TOTAL NUCLEATED RBC: 0 K/CU MM
TOTAL PROTEIN: 6 GM/DL (ref 6.4–8.2)
VANCOMYCIN RANDOM: 17 UG/ML
WBC # BLD: 9.1 K/CU MM (ref 4–10.5)

## 2022-01-29 PROCEDURE — 2580000003 HC RX 258: Performed by: PHYSICIAN ASSISTANT

## 2022-01-29 PROCEDURE — 6360000002 HC RX W HCPCS: Performed by: INTERNAL MEDICINE

## 2022-01-29 PROCEDURE — 6370000000 HC RX 637 (ALT 250 FOR IP): Performed by: INTERNAL MEDICINE

## 2022-01-29 PROCEDURE — 94761 N-INVAS EAR/PLS OXIMETRY MLT: CPT

## 2022-01-29 PROCEDURE — 80048 BASIC METABOLIC PNL TOTAL CA: CPT

## 2022-01-29 PROCEDURE — 2580000003 HC RX 258: Performed by: INTERNAL MEDICINE

## 2022-01-29 PROCEDURE — 85025 COMPLETE CBC W/AUTO DIFF WBC: CPT

## 2022-01-29 PROCEDURE — 6360000002 HC RX W HCPCS: Performed by: NURSE PRACTITIONER

## 2022-01-29 PROCEDURE — 80202 ASSAY OF VANCOMYCIN: CPT

## 2022-01-29 PROCEDURE — 82962 GLUCOSE BLOOD TEST: CPT

## 2022-01-29 PROCEDURE — 6360000002 HC RX W HCPCS: Performed by: PHYSICIAN ASSISTANT

## 2022-01-29 PROCEDURE — 83036 HEMOGLOBIN GLYCOSYLATED A1C: CPT

## 2022-01-29 PROCEDURE — 80053 COMPREHEN METABOLIC PANEL: CPT

## 2022-01-29 PROCEDURE — 6370000000 HC RX 637 (ALT 250 FOR IP): Performed by: PHYSICIAN ASSISTANT

## 2022-01-29 PROCEDURE — 2700000000 HC OXYGEN THERAPY PER DAY

## 2022-01-29 PROCEDURE — 36415 COLL VENOUS BLD VENIPUNCTURE: CPT

## 2022-01-29 PROCEDURE — 94640 AIRWAY INHALATION TREATMENT: CPT

## 2022-01-29 PROCEDURE — 1200000000 HC SEMI PRIVATE

## 2022-01-29 RX ORDER — NICOTINE POLACRILEX 4 MG
15 LOZENGE BUCCAL PRN
Status: DISCONTINUED | OUTPATIENT
Start: 2022-01-29 | End: 2022-02-02 | Stop reason: HOSPADM

## 2022-01-29 RX ORDER — TRAZODONE HYDROCHLORIDE 50 MG/1
50 TABLET ORAL NIGHTLY
Status: DISCONTINUED | OUTPATIENT
Start: 2022-01-29 | End: 2022-02-02 | Stop reason: HOSPADM

## 2022-01-29 RX ORDER — DEXTROSE MONOHYDRATE 50 MG/ML
100 INJECTION, SOLUTION INTRAVENOUS PRN
Status: DISCONTINUED | OUTPATIENT
Start: 2022-01-29 | End: 2022-02-02 | Stop reason: HOSPADM

## 2022-01-29 RX ORDER — HYDROMORPHONE HCL 110MG/55ML
0.5 PATIENT CONTROLLED ANALGESIA SYRINGE INTRAVENOUS ONCE
Status: COMPLETED | OUTPATIENT
Start: 2022-01-29 | End: 2022-01-29

## 2022-01-29 RX ORDER — DEXTROSE MONOHYDRATE 100 MG/ML
12.5 INJECTION, SOLUTION INTRAVENOUS PRN
Status: DISCONTINUED | OUTPATIENT
Start: 2022-01-29 | End: 2022-02-02 | Stop reason: HOSPADM

## 2022-01-29 RX ADMIN — VANCOMYCIN HYDROCHLORIDE 1000 MG: 1 INJECTION, POWDER, LYOPHILIZED, FOR SOLUTION INTRAVENOUS at 21:38

## 2022-01-29 RX ADMIN — SODIUM CHLORIDE, PRESERVATIVE FREE 10 ML: 5 INJECTION INTRAVENOUS at 21:26

## 2022-01-29 RX ADMIN — HEPARIN SODIUM 5000 UNITS: 5000 INJECTION INTRAVENOUS; SUBCUTANEOUS at 21:25

## 2022-01-29 RX ADMIN — SODIUM CHLORIDE, PRESERVATIVE FREE 10 ML: 5 INJECTION INTRAVENOUS at 08:47

## 2022-01-29 RX ADMIN — LISINOPRIL 5 MG: 5 TABLET ORAL at 08:47

## 2022-01-29 RX ADMIN — HEPARIN SODIUM 5000 UNITS: 5000 INJECTION INTRAVENOUS; SUBCUTANEOUS at 05:41

## 2022-01-29 RX ADMIN — ATORVASTATIN CALCIUM 40 MG: 40 TABLET, FILM COATED ORAL at 21:25

## 2022-01-29 RX ADMIN — CEFEPIME HYDROCHLORIDE 2000 MG: 2 INJECTION, POWDER, FOR SOLUTION INTRAVENOUS at 12:45

## 2022-01-29 RX ADMIN — FUROSEMIDE 80 MG: 10 INJECTION, SOLUTION INTRAMUSCULAR; INTRAVENOUS at 16:46

## 2022-01-29 RX ADMIN — FUROSEMIDE 80 MG: 10 INJECTION, SOLUTION INTRAMUSCULAR; INTRAVENOUS at 12:45

## 2022-01-29 RX ADMIN — TIOTROPIUM BROMIDE INHALATION SPRAY 2 PUFF: 3.12 SPRAY, METERED RESPIRATORY (INHALATION) at 21:08

## 2022-01-29 RX ADMIN — MORPHINE SULFATE 2 MG: 2 INJECTION, SOLUTION INTRAMUSCULAR; INTRAVENOUS at 08:46

## 2022-01-29 RX ADMIN — DEXTROSE MONOHYDRATE 100 ML/HR: 50 INJECTION, SOLUTION INTRAVENOUS at 06:23

## 2022-01-29 RX ADMIN — CARVEDILOL 3.12 MG: 6.25 TABLET, FILM COATED ORAL at 08:47

## 2022-01-29 RX ADMIN — FUROSEMIDE 80 MG: 10 INJECTION, SOLUTION INTRAMUSCULAR; INTRAVENOUS at 08:47

## 2022-01-29 RX ADMIN — HYDROMORPHONE HYDROCHLORIDE 0.5 MG: 2 INJECTION, SOLUTION INTRAMUSCULAR; INTRAVENOUS; SUBCUTANEOUS at 05:18

## 2022-01-29 RX ADMIN — CARVEDILOL 3.12 MG: 6.25 TABLET, FILM COATED ORAL at 21:25

## 2022-01-29 RX ADMIN — HEPARIN SODIUM 5000 UNITS: 5000 INJECTION INTRAVENOUS; SUBCUTANEOUS at 12:45

## 2022-01-29 RX ADMIN — MORPHINE SULFATE 2 MG: 2 INJECTION, SOLUTION INTRAMUSCULAR; INTRAVENOUS at 15:14

## 2022-01-29 RX ADMIN — CLOPIDOGREL 75 MG: 75 TABLET, FILM COATED ORAL at 08:47

## 2022-01-29 RX ADMIN — SPIRONOLACTONE 12.5 MG: 25 TABLET ORAL at 08:47

## 2022-01-29 RX ADMIN — TRAZODONE HYDROCHLORIDE 50 MG: 50 TABLET ORAL at 21:25

## 2022-01-29 RX ADMIN — METOLAZONE 5 MG: 5 TABLET ORAL at 08:47

## 2022-01-29 RX ADMIN — MORPHINE SULFATE 2 MG: 2 INJECTION, SOLUTION INTRAMUSCULAR; INTRAVENOUS at 03:48

## 2022-01-29 ASSESSMENT — PAIN SCALES - GENERAL
PAINLEVEL_OUTOF10: 10
PAINLEVEL_OUTOF10: 0
PAINLEVEL_OUTOF10: 10

## 2022-01-29 ASSESSMENT — PAIN DESCRIPTION - FREQUENCY: FREQUENCY: CONTINUOUS

## 2022-01-29 ASSESSMENT — PAIN DESCRIPTION - LOCATION: LOCATION: ANKLE;FOOT

## 2022-01-29 ASSESSMENT — PAIN DESCRIPTION - ORIENTATION: ORIENTATION: RIGHT

## 2022-01-29 ASSESSMENT — PAIN DESCRIPTION - PAIN TYPE: TYPE: ACUTE PAIN;CHRONIC PAIN

## 2022-01-29 ASSESSMENT — PAIN DESCRIPTION - DESCRIPTORS: DESCRIPTORS: ACHING

## 2022-01-29 ASSESSMENT — PAIN DESCRIPTION - PROGRESSION: CLINICAL_PROGRESSION: NOT CHANGED

## 2022-01-29 ASSESSMENT — PAIN DESCRIPTION - ONSET: ONSET: ON-GOING

## 2022-01-29 ASSESSMENT — PAIN - FUNCTIONAL ASSESSMENT: PAIN_FUNCTIONAL_ASSESSMENT: PREVENTS OR INTERFERES SOME ACTIVE ACTIVITIES AND ADLS

## 2022-01-29 NOTE — CONSULTS
Attempted PT evaluation. Pt is despondent, says he is done with all ambulation/transfers and is too sick to participate in therapy. Emotional support and encouragement provided. Will re-attempt as time permits.     Nicky Nagel, PT

## 2022-01-29 NOTE — PROGRESS NOTES
Nephrology Progress Note  1/29/2022 8:40 AM  Subjective: Interval History: Raisa Dale is a 70 y.o. male with doing okay somewhat restless weak in bed good urine output with diuretics        Data:   Scheduled Meds:   insulin lispro  0-6 Units SubCUTAneous TID WC    vitamin D  50,000 Units Oral Weekly    tiotropium  2 puff Inhalation Nightly    cefepime  2,000 mg IntraVENous Q24H    metOLazone  5 mg Oral Daily    spironolactone  12.5 mg Oral Daily    vancomycin (VANCOCIN) intermittent dosing (placeholder)   Other See Admin Instructions    metaxalone  400 mg Oral TID    morphine  2 mg IntraVENous Once    furosemide  80 mg IntraVENous 4x Daily    atorvastatin  40 mg Oral Nightly    carvedilol  3.125 mg Oral BID    clopidogrel  75 mg Oral Daily    sodium chloride flush  5-40 mL IntraVENous 2 times per day    heparin (porcine)  5,000 Units SubCUTAneous 3 times per day    lisinopril  5 mg Oral Daily     Continuous Infusions:   dextrose      sodium chloride 25 mL (01/26/22 1211)    dextrose 100 mL/hr (01/29/22 0623)         CBC   Recent Labs     01/27/22 0337 01/28/22  0338 01/29/22  0340   WBC 8.7 9.2 9.1   HGB 8.3* 8.2* 8.5*   HCT 27.3* 27.4* 28.7*    238 263      BMP   Recent Labs     01/27/22  0337 01/28/22  0338 01/29/22  0340   * 137 139   K 4.6 4.6 4.8   CL 98* 97* 97*   CO2 26 30 31   PHOS 3.8  --   --    BUN 53* 56* 55*   CREATININE 2.7* 2.6* 2.7*     Hepatic:   Recent Labs     01/29/22  0340   AST 25   ALT 16   BILITOT 1.0   ALKPHOS 129     Troponin: No results for input(s): TROPONINI in the last 72 hours. BNP: No results for input(s): BNP in the last 72 hours. Lipids: No results for input(s): CHOL, HDL in the last 72 hours. Invalid input(s): LDLCALCU  ABGs: No results found for: PHART, PO2ART, LJC0CVL  INR: No results for input(s): INR in the last 72 hours.   Renal Labs  Albumin:    Lab Results   Component Value Date    LABALBU 2.5 01/29/2022     Calcium:    Lab Results   Component Value Date    CALCIUM 7.7 01/29/2022     Phosphorus:    Lab Results   Component Value Date    PHOS 3.8 01/27/2022     U/A:    Lab Results   Component Value Date    NITRU NEGATIVE 01/23/2022    COLORU YELLOW 01/23/2022    PHUR 5.0 08/19/2016    WBCUA 34 01/23/2022    RBCUA 19 01/23/2022    MUCUS RARE 01/23/2022    TRICHOMONAS NONE SEEN 10/29/2021    BACTERIA NEGATIVE 01/23/2022    CLARITYU CLEAR 01/23/2022    SPECGRAV 1.015 01/23/2022    UROBILINOGEN 1.0 01/23/2022    BILIRUBINUR NEGATIVE 01/23/2022    BLOODU MODERATE 01/23/2022    KETUA NEGATIVE 01/23/2022     ABG:  No results found for: PHART, WYY2BUN, PO2ART, KIK9WBD, BEART, THGBART, EXF7IBF, R6MPODHS  HgBA1c:    Lab Results   Component Value Date    LABA1C 7.9 06/07/2021     Microalbumen/Creatinine ratio:  No components found for: RUCREAT  TSH:  No results found for: TSH  IRON:    Lab Results   Component Value Date    IRON 30 01/27/2022     Iron Saturation:  No components found for: PERCENTFE  TIBC:    Lab Results   Component Value Date    TIBC 182 01/27/2022     FERRITIN:    Lab Results   Component Value Date    FERRITIN 102 06/08/2021     RPR:  No results found for: RPR  TIGIST:  No results found for: ANATITER, TIGIST  24 Hour Urine for Creatinine Clearance:  No components found for: CREAT4, UHRS10, UTV10      Objective:   I/O: 01/28 0701 - 01/29 0700  In: -   Out: 3100 [Urine:3100]  I/O last 3 completed shifts:  In: -   Out: 3900 [Urine:3900]  No intake/output data recorded. Vitals: /68   Pulse 63   Temp 99.3 °F (37.4 °C) (Oral)   Resp 16   Ht 6' (1.829 m)   Wt 278 lb 10.6 oz (126.4 kg)   SpO2 94%   BMI 37.79 kg/m²  {  General appearance: awake weak  HEENT: Head: Normal, normocephalic, atraumatic.   Neck: supple, symmetrical, trachea midline  Lungs: diminished breath sounds bilaterally  Heart: S1, S2 normal  Abdomen: abnormal findings:  soft nt obese  Extremities: edema positive  Neurologic: Mental status: alertness: alert anxious        Assessment and Plan:      IMP:  #1 acute renal failure from ATN on CKD 4  #2 fluid overload with severe ischemic cardiomyopathy  #3 hypertension  #4 peripheral arterial disease with leg wounds  #5 anxiety    Plan     #1 renal function monitor slightly increased with diuresis keep negative balance hold IV fluids at this time  #2 monitor cardiac status with aggressive diuresis hopefully try to avoid dialysis  #3 blood pressure holding stable  #5 monitor circulation wounds with low-grade fever  #5 try to start low-dose trazodone to help with anxiety and stress           Ciaarn Butcher MD, MD

## 2022-01-29 NOTE — PROGRESS NOTES
Pt in extreme pain. Pt has been crying out in pain all night. Morphine 2 mg given at 2343 (1/28/2022) and 0348 (1/29/2022). Pt continues to yell out in pain. Andi Rogers NP messaged for orders.

## 2022-01-29 NOTE — PROGRESS NOTES
[address] [facility]  consulted by Dr. Aleks Lindsey for monitoring and adjustment. Indication for treatment: SSTI  Goal trough: 10-15 mcg/mL  AUC/RJ:  <500    Pertinent Laboratory Values:   Temp Readings from Last 3 Encounters:   01/29/22 99.3 °F (37.4 °C)   01/16/22 97 °F (36.1 °C) (Oral)   01/06/22 98.3 °F (36.8 °C) (Temporal)     Recent Labs     01/27/22  0337 01/28/22  0338 01/29/22  0340   WBC 8.7 9.2 9.1     Recent Labs     01/27/22  0337 01/28/22  0338 01/29/22  0340   BUN 53* 56* 55*   CREATININE 2.7* 2.6* 2.7*     Estimated Creatinine Clearance: 34 mL/min (A) (based on SCr of 2.7 mg/dL (H)). Intake/Output Summary (Last 24 hours) at 1/29/2022 1639  Last data filed at 1/29/2022 1520  Gross per 24 hour   Intake --   Output 4250 ml   Net -4250 ml     Pertinent Cultures:  Date    Source    Results  1/23   Wound    MRSA    Vancomycin level:   TROUGH:  No results for input(s): VANCOTROUGH in the last 72 hours.   RANDOM:    Recent Labs     01/27/22 0337 01/29/22  0340   VANCORANDOM 17.3 17.0       Assessment:  · SCr, BUN, and urine output:   · NANCY on CKD, Scr trend stable @ 2.7  · Day(s) of therapy: 7  · Vancomycin concentration:   · 1/26: 24.2  · 1/27: 17.3, 29 hour level post 1250mg dose  · 1/29: 17, appropriate to re-dose    Plan:  · Intermittent vancomycin dosing with CKD  · Based on level, re-dose with 1000 mg x1  · Repeat level in 48h  · Pharmacy will continue to monitor patient and adjust therapy as indicated    Brent 3 1/31 @ 0600    Thank you for the consult,  Jyoti Johnson [address] [place] [address], PharmD  1/29/2022 4:39 PM

## 2022-01-29 NOTE — PROGRESS NOTES
Hospitalist Progress Note      Name:  Chriss Paige /Age/Sex: 1950  (70 y.o. male)   MRN & CSN:  [id number] & [id number] Admission Date/Time: 2022  3:40 AM   [facility]- PCP: María Elena Giron Day: 8    Assessment and Plan:       70 y.o.  male with PMH of DM 2, HFrEF/right-sided CHF, HTN, HLD, SSS, s/p PCM and diabetic neuropathy admitted on 2022 with complaints of increasing lower extremity edema associated with orthopnea with findings of acute on chronic HFrEF along with scrotal lymphedema.     Acute on chronic biventricular decompensated heart failure  Elevated troponin  -Edema significantly improved since admission  -Echo  with preserved EF; severely dilated right ventricle with severe tricuspid regurgitation RVSP of 75   continue 80 IV Lasix 4 times a day  -Metolazone added today  -Continue spironolactone  Cardiologist on board  Nephrologist on board  Monitor renal function and serum electrolytes      Scrotal lymphedema  Penile and scrotal purulence  -Edema improving daily  -No further purulence  -Scrotal cultures growing MRSA  -Renal ultrasound with extensive soft tissue swelling; no visualized abscess noted  -Urology following we will continue elevation and ice  -Continue IV antibiotics cefepime and vancomycin     History of CKD stage IV  -Nephrology following for diuretic regimen  -Creatinine slight increase from  however currently stable in setting of diuretic therapy for above  -Appreciate nephrology recommendations  Serum creatinine today 2.6    Hypoglycemia: DC high scale sliding scale correction and start on low scale sliding scale correction.   On hypoglycemia protocol check blood sugar 3 times per day and before bedtime       Chronic medical conditions: Home medications resumed was contraindicated  Hypertension  Diabetes mellitus type 2  Sick sinus syndrome status post pacer  PAD  Diabetic foot wounds  Status post right-sided crepitation  Chronic normocytic anemia     Reason for continued hospitalization: Continue IV diuresis and iv AB     Diet ADULT DIET; Regular; 5 carb choices (75 gm/meal); Low Sodium (2 gm)   DVT Prophylaxis [] Lovenox, [x]  Heparin, [] SCDs, [] Ambulation   GI Prophylaxis [] PPI,  [] H2 Blocker,  [] Carafate,  [] Diet/Tube Feeds   Code Status Full Code   Disposition Patient requires continued admission due to    MDM [] Low, [] Moderate,[]  High  Patient's risk as above due to      History of Present Illness: The patient was seen and examined at the bedside. Patient denies shortness of breath or chest pain   PT OT evaluation ordered  Patient developed low blood sugar last night 49, noted on dextrose IV fluid as per hypoglycemia protocol  Discontinue high scale sliding scale correction, start on low scale sliding scale correction 3 times per day before meals to avoid hypoglycemia especially at night  Discussed with the nurse, plan to check blood sugar if within normal, stop IV fluid        Objective: Intake/Output Summary (Last 24 hours) at 1/29/2022 0831  Last data filed at 1/29/2022 0359  Gross per 24 hour   Intake --   Output 3100 ml   Net -3100 ml      Vitals:   Vitals:    01/29/22 0245   BP: 137/68   Pulse: 63   Resp: 16   Temp: 99.3 °F (37.4 °C)   SpO2: 94%     Physical Exam:   GEN Awake.  Alert , not in respiratory distress, not in pain  HEENT: PEERLA, , supple neck,   Chest: air entry equal bilaterally, no wheezing or crepitation  Heart: S1 and S2 heard, no murmur, no gallop or rub, regular rate  Abdomen: soft, ND , Nt, +BS  Extremities: no cyanosis, tenderness or erythema, peripheral pulses audible, scrotal lymphedema with nonpitting swelling of both leg  Neurology: alert, oriented x3, able to move 4 limbs    Medications:   Medications:    insulin lispro  0-6 Units SubCUTAneous TID     vitamin D  50,000 Units Oral Weekly    tiotropium  2 puff Inhalation Nightly    cefepime  2,000 mg IntraVENous Q24H    metOLazone  5 mg Oral Daily    spironolactone  12.5 mg Oral Daily    vancomycin (VANCOCIN) intermittent dosing (placeholder)   Other See Admin Instructions    metaxalone  400 mg Oral TID    morphine  2 mg IntraVENous Once    furosemide  80 mg IntraVENous 4x Daily    atorvastatin  40 mg Oral Nightly    carvedilol  3.125 mg Oral BID    clopidogrel  75 mg Oral Daily    sodium chloride flush  5-40 mL IntraVENous 2 times per day    heparin (porcine)  5,000 Units SubCUTAneous 3 times per day    lisinopril  5 mg Oral Daily      Infusions:    dextrose      sodium chloride 25 mL (01/26/22 1211)    dextrose 100 mL/hr (01/29/22 0623)     PRN Meds: glucose, 15 g, PRN  dextrose, 12.5 g, PRN  glucagon (rDNA), 1 mg, PRN  dextrose, 100 mL/hr, PRN  oxyCODONE, 7.5 mg, Q4H PRN  albuterol sulfate HFA, 2 puff, Q4H PRN  sodium chloride flush, 5-40 mL, PRN  sodium chloride, 25 mL, PRN  ondansetron, 4 mg, Q8H PRN   Or  ondansetron, 4 mg, Q6H PRN  polyethylene glycol, 17 g, Daily PRN  acetaminophen, 650 mg, Q6H PRN   Or  acetaminophen, 650 mg, Q6H PRN  glucose, 15 g, PRN  glucagon (rDNA), 1 mg, PRN  dextrose, 100 mL/hr, PRN  acetaminophen, 650 mg, Q6H PRN  ondansetron, 4 mg, Q6H PRN  benzonatate, 100 mg, TID PRN  melatonin, 3 mg, Nightly PRN  ALPRAZolam, 0.25 mg, Nightly PRN  aluminum & magnesium hydroxide-simethicone, 30 mL, Q6H PRN  morphine, 2 mg, Q4H PRN  dextrose bolus (hypoglycemia), 125 mL, PRN   Or  dextrose bolus (hypoglycemia), 250 mL, PRN          Electronically signed by Ginger Murray MD on 1/29/2022 at 8:31 AM

## 2022-01-30 LAB
ALBUMIN SERPL-MCNC: 2.6 GM/DL (ref 3.4–5)
ALP BLD-CCNC: 111 IU/L (ref 40–128)
ALT SERPL-CCNC: 13 U/L (ref 10–40)
ANION GAP SERPL CALCULATED.3IONS-SCNC: 8 MMOL/L (ref 4–16)
AST SERPL-CCNC: 17 IU/L (ref 15–37)
BASOPHILS ABSOLUTE: 0 K/CU MM
BASOPHILS RELATIVE PERCENT: 0.3 % (ref 0–1)
BILIRUB SERPL-MCNC: 1.4 MG/DL (ref 0–1)
BUN BLDV-MCNC: 57 MG/DL (ref 6–23)
CALCIUM SERPL-MCNC: 8.3 MG/DL (ref 8.3–10.6)
CHLORIDE BLD-SCNC: 92 MMOL/L (ref 99–110)
CO2: 36 MMOL/L (ref 21–32)
CREAT SERPL-MCNC: 2.4 MG/DL (ref 0.9–1.3)
DIFFERENTIAL TYPE: ABNORMAL
EOSINOPHILS ABSOLUTE: 0.1 K/CU MM
EOSINOPHILS RELATIVE PERCENT: 1 % (ref 0–3)
GFR AFRICAN AMERICAN: 32 ML/MIN/1.73M2
GFR NON-AFRICAN AMERICAN: 27 ML/MIN/1.73M2
GLUCOSE BLD-MCNC: 192 MG/DL (ref 70–99)
GLUCOSE BLD-MCNC: 192 MG/DL (ref 70–99)
GLUCOSE BLD-MCNC: 236 MG/DL (ref 70–99)
GLUCOSE BLD-MCNC: 242 MG/DL (ref 70–99)
GLUCOSE BLD-MCNC: 244 MG/DL (ref 70–99)
HCT VFR BLD CALC: 29.1 % (ref 42–52)
HEMOGLOBIN: 8.7 GM/DL (ref 13.5–18)
IMMATURE NEUTROPHIL %: 0.4 % (ref 0–0.43)
LYMPHOCYTES ABSOLUTE: 0.6 K/CU MM
LYMPHOCYTES RELATIVE PERCENT: 6.1 % (ref 24–44)
MCH RBC QN AUTO: 25.5 PG (ref 27–31)
MCHC RBC AUTO-ENTMCNC: 29.9 % (ref 32–36)
MCV RBC AUTO: 85.3 FL (ref 78–100)
MONOCYTES ABSOLUTE: 0.8 K/CU MM
MONOCYTES RELATIVE PERCENT: 7.7 % (ref 0–4)
NUCLEATED RBC %: 0 %
PDW BLD-RTO: 21 % (ref 11.7–14.9)
PLATELET # BLD: 280 K/CU MM (ref 140–440)
PMV BLD AUTO: 9.7 FL (ref 7.5–11.1)
POTASSIUM SERPL-SCNC: 4.1 MMOL/L (ref 3.5–5.1)
RBC # BLD: 3.41 M/CU MM (ref 4.6–6.2)
SEGMENTED NEUTROPHILS ABSOLUTE COUNT: 8.8 K/CU MM
SEGMENTED NEUTROPHILS RELATIVE PERCENT: 84.5 % (ref 36–66)
SODIUM BLD-SCNC: 136 MMOL/L (ref 135–145)
TOTAL IMMATURE NEUTOROPHIL: 0.04 K/CU MM
TOTAL NUCLEATED RBC: 0 K/CU MM
TOTAL PROTEIN: 6.4 GM/DL (ref 6.4–8.2)
WBC # BLD: 10.4 K/CU MM (ref 4–10.5)

## 2022-01-30 PROCEDURE — 6370000000 HC RX 637 (ALT 250 FOR IP): Performed by: INTERNAL MEDICINE

## 2022-01-30 PROCEDURE — 6360000002 HC RX W HCPCS: Performed by: INTERNAL MEDICINE

## 2022-01-30 PROCEDURE — 80053 COMPREHEN METABOLIC PANEL: CPT

## 2022-01-30 PROCEDURE — 82962 GLUCOSE BLOOD TEST: CPT

## 2022-01-30 PROCEDURE — 85025 COMPLETE CBC W/AUTO DIFF WBC: CPT

## 2022-01-30 PROCEDURE — 6370000000 HC RX 637 (ALT 250 FOR IP): Performed by: PHYSICIAN ASSISTANT

## 2022-01-30 PROCEDURE — 2580000003 HC RX 258: Performed by: INTERNAL MEDICINE

## 2022-01-30 PROCEDURE — 80048 BASIC METABOLIC PNL TOTAL CA: CPT

## 2022-01-30 PROCEDURE — 2580000003 HC RX 258: Performed by: PHYSICIAN ASSISTANT

## 2022-01-30 PROCEDURE — 2700000000 HC OXYGEN THERAPY PER DAY

## 2022-01-30 PROCEDURE — 85027 COMPLETE CBC AUTOMATED: CPT

## 2022-01-30 PROCEDURE — 6360000002 HC RX W HCPCS: Performed by: PHYSICIAN ASSISTANT

## 2022-01-30 PROCEDURE — 36415 COLL VENOUS BLD VENIPUNCTURE: CPT

## 2022-01-30 PROCEDURE — 1200000000 HC SEMI PRIVATE

## 2022-01-30 PROCEDURE — 94761 N-INVAS EAR/PLS OXIMETRY MLT: CPT

## 2022-01-30 RX ADMIN — FUROSEMIDE 80 MG: 10 INJECTION, SOLUTION INTRAMUSCULAR; INTRAVENOUS at 11:02

## 2022-01-30 RX ADMIN — SODIUM CHLORIDE, PRESERVATIVE FREE 10 ML: 5 INJECTION INTRAVENOUS at 02:36

## 2022-01-30 RX ADMIN — HEPARIN SODIUM 5000 UNITS: 5000 INJECTION INTRAVENOUS; SUBCUTANEOUS at 22:29

## 2022-01-30 RX ADMIN — SODIUM CHLORIDE, PRESERVATIVE FREE 10 ML: 5 INJECTION INTRAVENOUS at 19:51

## 2022-01-30 RX ADMIN — ATORVASTATIN CALCIUM 40 MG: 40 TABLET, FILM COATED ORAL at 22:29

## 2022-01-30 RX ADMIN — FUROSEMIDE 80 MG: 10 INJECTION, SOLUTION INTRAMUSCULAR; INTRAVENOUS at 02:36

## 2022-01-30 RX ADMIN — SPIRONOLACTONE 12.5 MG: 25 TABLET ORAL at 10:56

## 2022-01-30 RX ADMIN — SODIUM CHLORIDE, PRESERVATIVE FREE 5 ML: 5 INJECTION INTRAVENOUS at 11:40

## 2022-01-30 RX ADMIN — FUROSEMIDE 80 MG: 10 INJECTION, SOLUTION INTRAMUSCULAR; INTRAVENOUS at 19:50

## 2022-01-30 RX ADMIN — LISINOPRIL 5 MG: 5 TABLET ORAL at 10:56

## 2022-01-30 RX ADMIN — CEFEPIME HYDROCHLORIDE 2000 MG: 2 INJECTION, POWDER, FOR SOLUTION INTRAVENOUS at 11:10

## 2022-01-30 RX ADMIN — HEPARIN SODIUM 5000 UNITS: 5000 INJECTION INTRAVENOUS; SUBCUTANEOUS at 17:26

## 2022-01-30 RX ADMIN — CARVEDILOL 3.12 MG: 6.25 TABLET, FILM COATED ORAL at 22:28

## 2022-01-30 RX ADMIN — TRAZODONE HYDROCHLORIDE 50 MG: 50 TABLET ORAL at 22:28

## 2022-01-30 RX ADMIN — OXYCODONE HYDROCHLORIDE 7.5 MG: 5 TABLET ORAL at 22:29

## 2022-01-30 RX ADMIN — METOLAZONE 5 MG: 5 TABLET ORAL at 10:56

## 2022-01-30 RX ADMIN — CARVEDILOL 3.12 MG: 6.25 TABLET, FILM COATED ORAL at 10:56

## 2022-01-30 RX ADMIN — CLOPIDOGREL 75 MG: 75 TABLET, FILM COATED ORAL at 10:56

## 2022-01-30 RX ADMIN — OXYCODONE HYDROCHLORIDE 7.5 MG: 5 TABLET ORAL at 10:56

## 2022-01-30 RX ADMIN — HEPARIN SODIUM 5000 UNITS: 5000 INJECTION INTRAVENOUS; SUBCUTANEOUS at 06:47

## 2022-01-30 ASSESSMENT — PAIN SCALES - GENERAL
PAINLEVEL_OUTOF10: 9
PAINLEVEL_OUTOF10: 8
PAINLEVEL_OUTOF10: 0

## 2022-01-30 ASSESSMENT — PAIN DESCRIPTION - PROGRESSION
CLINICAL_PROGRESSION: NOT CHANGED
CLINICAL_PROGRESSION: NOT CHANGED

## 2022-01-30 ASSESSMENT — PAIN DESCRIPTION - PAIN TYPE: TYPE: ACUTE PAIN

## 2022-01-30 ASSESSMENT — PAIN DESCRIPTION - ONSET: ONSET: ON-GOING

## 2022-01-30 ASSESSMENT — PAIN DESCRIPTION - LOCATION: LOCATION: SACRUM

## 2022-01-30 ASSESSMENT — PAIN DESCRIPTION - ORIENTATION: ORIENTATION: MID

## 2022-01-30 ASSESSMENT — PAIN DESCRIPTION - FREQUENCY: FREQUENCY: CONTINUOUS

## 2022-01-30 ASSESSMENT — PAIN DESCRIPTION - DESCRIPTORS: DESCRIPTORS: SHARP

## 2022-01-30 NOTE — PROGRESS NOTES
Hospitalist Progress Note      Name:  Justo Magdaleno /Age/Sex: 1950  (70 y.o. male)   MRN & CSN:  [id number] & [id number] Admission Date/Time: 2022  3:40 AM   [facility] PCP: Elizabeth Arango Day: 9    Assessment and Plan:       70 y.o.  male with PMH of DM 2, HFrEF/right-sided CHF, HTN, HLD, SSS, s/p PCM and diabetic neuropathy admitted on 2022 with complaints of increasing lower extremity edema associated with orthopnea with findings of acute on chronic HFrEF along with scrotal lymphedema.     Acute on chronic biventricular decompensated heart failure  Elevated troponin  -Edema significantly improved since admission  -Echo  with preserved EF; severely dilated right ventricle with severe tricuspid regurgitation RVSP of 75   continue 80 IV Lasix 4 times a day as per nephrology  -Metolazone added today  -Continue spironolactone  Cardiologist on board  Nephrologist on board  Monitor renal function and serum electrolytes      Scrotal lymphedema  Penile and scrotal purulence  -Edema improving daily  -No further purulence  -Scrotal cultures growing MRSA  -Renal ultrasound with extensive soft tissue swelling; no visualized abscess noted  -Urology following we will continue elevation and ice  -Continue IV antibiotics cefepime and vancomycin     History of CKD stage IV  -Nephrology following for diuretic regimen  -Creatinine slight increase from  however currently stable in setting of diuretic therapy for above  -Appreciate nephrology recommendations  Serum creatinine 2.7    Hypoglycemia: DC high scale sliding scale correction and start on low scale sliding scale correction.   On hypoglycemia protocol check blood sugar 3 times per day and before bedtime  Blood sugar is more stable after adjustment of sliding scale correction       Chronic medical conditions: Home medications resumed was contraindicated  Hypertension  Diabetes mellitus type 2  Sick sinus syndrome status post pacer  PAD  Diabetic foot wounds  Status post right-sided crepitation  Chronic normocytic anemia     Reason for continued hospitalization: Continue IV diuresis and iv AB     Diet ADULT DIET; Regular; 5 carb choices (75 gm/meal); Low Sodium (2 gm)   DVT Prophylaxis [] Lovenox, [x]  Heparin, [] SCDs, [] Ambulation   GI Prophylaxis [] PPI,  [] H2 Blocker,  [] Carafate,  [] Diet/Tube Feeds   Code Status Full Code   Disposition Patient requires continued admission due to    MDM [] Low, [] Moderate,[]  High  Patient's risk as above due to      History of Present Illness: The patient was seen and examined at the bedside. Patient denies shortness of breath or chest pain  No further restlessness, blood sugar is more stable after adjustment of sliding scale correction  PT OT evaluation ordered          Objective: Intake/Output Summary (Last 24 hours) at 1/30/2022 0858  Last data filed at 1/30/2022 0646  Gross per 24 hour   Intake --   Output 7350 ml   Net -7350 ml      Vitals:   Vitals:    01/30/22 0230   BP: 134/64   Pulse: 66   Resp: 18   Temp:    SpO2:      Physical Exam:   GEN Awake.  Alert , not in respiratory distress, not in pain  HEENT: PEERLA, , supple neck,   Chest: air entry equal bilaterally, no wheezing or crepitation  Heart: S1 and S2 heard, no murmur, no gallop or rub, regular rate  Abdomen: soft, ND , Nt, +BS  Extremities: no cyanosis, tenderness or erythema, peripheral pulses audible, scrotal lymphedema with nonpitting swelling of both leg  Neurology: alert, oriented x3, able to move 4 limbs    Medications:   Medications:    insulin lispro  0-6 Units SubCUTAneous TID     traZODone  50 mg Oral Nightly    vitamin D  50,000 Units Oral Weekly    tiotropium  2 puff Inhalation Nightly    cefepime  2,000 mg IntraVENous Q24H    metOLazone  5 mg Oral Daily    spironolactone  12.5 mg Oral Daily    vancomycin (VANCOCIN) intermittent dosing (placeholder)   Other See Admin Instructions    metaxalone  400 mg Oral TID    morphine  2 mg IntraVENous Once    furosemide  80 mg IntraVENous 4x Daily    atorvastatin  40 mg Oral Nightly    carvedilol  3.125 mg Oral BID    clopidogrel  75 mg Oral Daily    sodium chloride flush  5-40 mL IntraVENous 2 times per day    heparin (porcine)  5,000 Units SubCUTAneous 3 times per day    lisinopril  5 mg Oral Daily      Infusions:    dextrose      dextrose      sodium chloride 25 mL (01/26/22 1211)    dextrose 100 mL/hr (01/29/22 0623)     PRN Meds: glucose, 15 g, PRN  dextrose, 12.5 g, PRN  glucagon (rDNA), 1 mg, PRN  dextrose, 100 mL/hr, PRN  oxyCODONE, 7.5 mg, Q4H PRN  albuterol sulfate HFA, 2 puff, Q4H PRN  sodium chloride flush, 5-40 mL, PRN  sodium chloride, 25 mL, PRN  ondansetron, 4 mg, Q8H PRN   Or  ondansetron, 4 mg, Q6H PRN  polyethylene glycol, 17 g, Daily PRN  acetaminophen, 650 mg, Q6H PRN   Or  acetaminophen, 650 mg, Q6H PRN  dextrose, 100 mL/hr, PRN  acetaminophen, 650 mg, Q6H PRN  ondansetron, 4 mg, Q6H PRN  benzonatate, 100 mg, TID PRN  melatonin, 3 mg, Nightly PRN  ALPRAZolam, 0.25 mg, Nightly PRN  aluminum & magnesium hydroxide-simethicone, 30 mL, Q6H PRN  morphine, 2 mg, Q4H PRN  dextrose bolus (hypoglycemia), 125 mL, PRN   Or  dextrose bolus (hypoglycemia), 250 mL, PRN          Electronically signed by Cezar Colon MD on 1/30/2022 at 8:58 AM

## 2022-01-30 NOTE — PROGRESS NOTES
Nephrology Progress Note  1/30/2022 1:50 PM  Subjective: Interval History: Carmelo Adams is a 70 y.o. male states doing better no distress less short of breath      Data:   Scheduled Meds:   insulin lispro  0-6 Units SubCUTAneous TID WC    traZODone  50 mg Oral Nightly    vitamin D  50,000 Units Oral Weekly    tiotropium  2 puff Inhalation Nightly    cefepime  2,000 mg IntraVENous Q24H    metOLazone  5 mg Oral Daily    spironolactone  12.5 mg Oral Daily    vancomycin (VANCOCIN) intermittent dosing (placeholder)   Other See Admin Instructions    metaxalone  400 mg Oral TID    morphine  2 mg IntraVENous Once    furosemide  80 mg IntraVENous 4x Daily    atorvastatin  40 mg Oral Nightly    carvedilol  3.125 mg Oral BID    clopidogrel  75 mg Oral Daily    sodium chloride flush  5-40 mL IntraVENous 2 times per day    heparin (porcine)  5,000 Units SubCUTAneous 3 times per day    lisinopril  5 mg Oral Daily     Continuous Infusions:   dextrose      dextrose      sodium chloride 25 mL (01/26/22 1211)    dextrose 100 mL/hr (01/29/22 0623)         CBC   Recent Labs     01/28/22  0338 01/29/22  0340 01/30/22  0918   WBC 9.2 9.1 10.4   HGB 8.2* 8.5* 8.7*   HCT 27.4* 28.7* 29.1*    263 280      BMP   Recent Labs     01/28/22  0338 01/29/22  0340 01/30/22  0918    139 136   K 4.6 4.8 4.1   CL 97* 97* 92*   CO2 30 31 36*   BUN 56* 55* 57*   CREATININE 2.6* 2.7* 2.4*     Hepatic:   Recent Labs     01/29/22  0340 01/30/22  0918   AST 25 17   ALT 16 13   BILITOT 1.0 1.4*   ALKPHOS 129 111     Troponin: No results for input(s): TROPONINI in the last 72 hours. BNP: No results for input(s): BNP in the last 72 hours. Lipids: No results for input(s): CHOL, HDL in the last 72 hours. Invalid input(s): LDLCALCU  ABGs: No results found for: PHART, PO2ART, IUA8HWP  INR: No results for input(s): INR in the last 72 hours.   Renal Labs  Albumin:    Lab Results   Component Value Date    LABALBU 2.6 01/30/2022     Calcium:    Lab Results   Component Value Date    CALCIUM 8.3 01/30/2022     Phosphorus:    Lab Results   Component Value Date    PHOS 3.8 01/27/2022     U/A:    Lab Results   Component Value Date    NITRU NEGATIVE 01/23/2022    COLORU YELLOW 01/23/2022    PHUR 5.0 08/19/2016    WBCUA 34 01/23/2022    RBCUA 19 01/23/2022    MUCUS RARE 01/23/2022    TRICHOMONAS NONE SEEN 10/29/2021    BACTERIA NEGATIVE 01/23/2022    CLARITYU CLEAR 01/23/2022    SPECGRAV 1.015 01/23/2022    UROBILINOGEN 1.0 01/23/2022    BILIRUBINUR NEGATIVE 01/23/2022    BLOODU MODERATE 01/23/2022    KETUA NEGATIVE 01/23/2022     ABG:  No results found for: PHART, DOH6AEL, PO2ART, VCK9BHO, BEART, THGBART, DGO2TBW, Y7CFRYUV  HgBA1c:    Lab Results   Component Value Date    LABA1C 8.0 01/29/2022     Microalbumen/Creatinine ratio:  No components found for: RUCREAT  TSH:  No results found for: TSH  IRON:    Lab Results   Component Value Date    IRON 30 01/27/2022     Iron Saturation:  No components found for: PERCENTFE  TIBC:    Lab Results   Component Value Date    TIBC 182 01/27/2022     FERRITIN:    Lab Results   Component Value Date    FERRITIN 102 06/08/2021     RPR:  No results found for: RPR  TIGIST:  No results found for: ANATITER, TIGIST  24 Hour Urine for Creatinine Clearance:  No components found for: CREAT4, UHRS10, UTV10      Objective:   I/O: 01/29 0701 - 01/30 0700  In: -   Out: 7350 [Urine:7350]  I/O last 3 completed shifts:  In: -   Out: 9550 [Urine:9550]  No intake/output data recorded. Vitals: /61   Pulse 64   Temp 97.8 °F (36.6 °C) (Oral)   Resp 18   Ht 6' (1.829 m)   Wt 278 lb 10.6 oz (126.4 kg)   SpO2 94%   BMI 37.79 kg/m²  {  General appearance: awake weak  HEENT: Head: Normal, normocephalic, atraumatic.   Neck: supple, symmetrical, trachea midline  Lungs: diminished breath sounds bilaterally  Heart: S1, S2 normal  Abdomen: abnormal findings:  soft nt obese  Extremities: edema positive  Neurologic: Mental status: alertness: alert anxious        Assessment and Plan:      IMP:  #1 acute renal failure from ATN on CKD 4  #2 fluid overload with severe ischemic cardiomyopathy  #3 hypertension  #4 peripheral arterial disease with leg wounds  #5 anxiety    Plan     #1 creatinine holding 2.4 improved good urine output  #2 maintain diuresis  #3 blood pressure stable  #4 monitor wound care  #5 affect improved  #6 work on discharge Jarad Chun MD, MD

## 2022-01-31 LAB
ALBUMIN SERPL-MCNC: 2.6 GM/DL (ref 3.4–5)
ALP BLD-CCNC: 111 IU/L (ref 40–128)
ALT SERPL-CCNC: 12 U/L (ref 10–40)
ANION GAP SERPL CALCULATED.3IONS-SCNC: 10 MMOL/L (ref 4–16)
AST SERPL-CCNC: 15 IU/L (ref 15–37)
BASOPHILS ABSOLUTE: 0 K/CU MM
BASOPHILS RELATIVE PERCENT: 0.3 % (ref 0–1)
BILIRUB SERPL-MCNC: 1.1 MG/DL (ref 0–1)
BUN BLDV-MCNC: 58 MG/DL (ref 6–23)
CALCIUM SERPL-MCNC: 8.2 MG/DL (ref 8.3–10.6)
CHLORIDE BLD-SCNC: 93 MMOL/L (ref 99–110)
CO2: 36 MMOL/L (ref 21–32)
CREAT SERPL-MCNC: 2.5 MG/DL (ref 0.9–1.3)
DIFFERENTIAL TYPE: ABNORMAL
DOSE AMOUNT: NORMAL
DOSE TIME: NORMAL
EOSINOPHILS ABSOLUTE: 0.1 K/CU MM
EOSINOPHILS RELATIVE PERCENT: 1.4 % (ref 0–3)
GFR AFRICAN AMERICAN: 31 ML/MIN/1.73M2
GFR NON-AFRICAN AMERICAN: 26 ML/MIN/1.73M2
GLUCOSE BLD-MCNC: 199 MG/DL (ref 70–99)
GLUCOSE BLD-MCNC: 227 MG/DL (ref 70–99)
GLUCOSE BLD-MCNC: 236 MG/DL (ref 70–99)
GLUCOSE BLD-MCNC: 238 MG/DL (ref 70–99)
GLUCOSE BLD-MCNC: 285 MG/DL (ref 70–99)
HCT VFR BLD CALC: 29.2 % (ref 42–52)
HEMOGLOBIN: 8.7 GM/DL (ref 13.5–18)
IMMATURE NEUTROPHIL %: 0.4 % (ref 0–0.43)
LYMPHOCYTES ABSOLUTE: 0.9 K/CU MM
LYMPHOCYTES RELATIVE PERCENT: 9.3 % (ref 24–44)
MCH RBC QN AUTO: 25.6 PG (ref 27–31)
MCHC RBC AUTO-ENTMCNC: 29.8 % (ref 32–36)
MCV RBC AUTO: 85.9 FL (ref 78–100)
MONOCYTES ABSOLUTE: 0.7 K/CU MM
MONOCYTES RELATIVE PERCENT: 7.9 % (ref 0–4)
NUCLEATED RBC %: 0 %
PDW BLD-RTO: 21.1 % (ref 11.7–14.9)
PLATELET # BLD: 279 K/CU MM (ref 140–440)
PMV BLD AUTO: 9.9 FL (ref 7.5–11.1)
POTASSIUM SERPL-SCNC: 4.2 MMOL/L (ref 3.5–5.1)
RBC # BLD: 3.4 M/CU MM (ref 4.6–6.2)
SEGMENTED NEUTROPHILS ABSOLUTE COUNT: 7.6 K/CU MM
SEGMENTED NEUTROPHILS RELATIVE PERCENT: 80.7 % (ref 36–66)
SODIUM BLD-SCNC: 139 MMOL/L (ref 135–145)
TOTAL IMMATURE NEUTOROPHIL: 0.04 K/CU MM
TOTAL NUCLEATED RBC: 0 K/CU MM
TOTAL PROTEIN: 6.5 GM/DL (ref 6.4–8.2)
VANCOMYCIN RANDOM: 16.7 UG/ML
WBC # BLD: 9.4 K/CU MM (ref 4–10.5)

## 2022-01-31 PROCEDURE — 1200000000 HC SEMI PRIVATE

## 2022-01-31 PROCEDURE — 2580000003 HC RX 258: Performed by: INTERNAL MEDICINE

## 2022-01-31 PROCEDURE — 6370000000 HC RX 637 (ALT 250 FOR IP): Performed by: INTERNAL MEDICINE

## 2022-01-31 PROCEDURE — 97162 PT EVAL MOD COMPLEX 30 MIN: CPT

## 2022-01-31 PROCEDURE — 80053 COMPREHEN METABOLIC PANEL: CPT

## 2022-01-31 PROCEDURE — 36415 COLL VENOUS BLD VENIPUNCTURE: CPT

## 2022-01-31 PROCEDURE — 82962 GLUCOSE BLOOD TEST: CPT

## 2022-01-31 PROCEDURE — 6360000002 HC RX W HCPCS: Performed by: PHYSICIAN ASSISTANT

## 2022-01-31 PROCEDURE — 6370000000 HC RX 637 (ALT 250 FOR IP): Performed by: PHYSICIAN ASSISTANT

## 2022-01-31 PROCEDURE — 99213 OFFICE O/P EST LOW 20 MIN: CPT

## 2022-01-31 PROCEDURE — 85025 COMPLETE CBC W/AUTO DIFF WBC: CPT

## 2022-01-31 PROCEDURE — 80202 ASSAY OF VANCOMYCIN: CPT

## 2022-01-31 PROCEDURE — 97530 THERAPEUTIC ACTIVITIES: CPT

## 2022-01-31 PROCEDURE — 2580000003 HC RX 258: Performed by: PHYSICIAN ASSISTANT

## 2022-01-31 PROCEDURE — 94761 N-INVAS EAR/PLS OXIMETRY MLT: CPT

## 2022-01-31 PROCEDURE — 6360000002 HC RX W HCPCS: Performed by: INTERNAL MEDICINE

## 2022-01-31 RX ORDER — METOLAZONE 2.5 MG/1
2.5 TABLET ORAL DAILY
Status: DISCONTINUED | OUTPATIENT
Start: 2022-01-31 | End: 2022-02-02 | Stop reason: HOSPADM

## 2022-01-31 RX ORDER — TORSEMIDE 20 MG/1
40 TABLET ORAL DAILY
Status: DISCONTINUED | OUTPATIENT
Start: 2022-01-31 | End: 2022-02-02 | Stop reason: HOSPADM

## 2022-01-31 RX ADMIN — HEPARIN SODIUM 5000 UNITS: 5000 INJECTION INTRAVENOUS; SUBCUTANEOUS at 21:43

## 2022-01-31 RX ADMIN — OXYCODONE HYDROCHLORIDE 7.5 MG: 5 TABLET ORAL at 04:46

## 2022-01-31 RX ADMIN — SPIRONOLACTONE 12.5 MG: 25 TABLET ORAL at 08:23

## 2022-01-31 RX ADMIN — CEFEPIME HYDROCHLORIDE 2000 MG: 2 INJECTION, POWDER, FOR SOLUTION INTRAVENOUS at 11:06

## 2022-01-31 RX ADMIN — SODIUM CHLORIDE, PRESERVATIVE FREE 10 ML: 5 INJECTION INTRAVENOUS at 08:23

## 2022-01-31 RX ADMIN — HEPARIN SODIUM 5000 UNITS: 5000 INJECTION INTRAVENOUS; SUBCUTANEOUS at 06:49

## 2022-01-31 RX ADMIN — CLOPIDOGREL 75 MG: 75 TABLET, FILM COATED ORAL at 08:23

## 2022-01-31 RX ADMIN — SODIUM CHLORIDE, PRESERVATIVE FREE 10 ML: 5 INJECTION INTRAVENOUS at 11:00

## 2022-01-31 RX ADMIN — CARVEDILOL 3.12 MG: 6.25 TABLET, FILM COATED ORAL at 21:42

## 2022-01-31 RX ADMIN — METOLAZONE 2.5 MG: 2.5 TABLET ORAL at 11:00

## 2022-01-31 RX ADMIN — ATORVASTATIN CALCIUM 40 MG: 40 TABLET, FILM COATED ORAL at 21:43

## 2022-01-31 RX ADMIN — LISINOPRIL 5 MG: 5 TABLET ORAL at 08:22

## 2022-01-31 RX ADMIN — HEPARIN SODIUM 5000 UNITS: 5000 INJECTION INTRAVENOUS; SUBCUTANEOUS at 13:26

## 2022-01-31 RX ADMIN — SODIUM CHLORIDE, PRESERVATIVE FREE 10 ML: 5 INJECTION INTRAVENOUS at 21:43

## 2022-01-31 RX ADMIN — VANCOMYCIN HYDROCHLORIDE 1000 MG: 1 INJECTION, POWDER, LYOPHILIZED, FOR SOLUTION INTRAVENOUS at 14:46

## 2022-01-31 RX ADMIN — TRAZODONE HYDROCHLORIDE 50 MG: 50 TABLET ORAL at 21:43

## 2022-01-31 RX ADMIN — FUROSEMIDE 80 MG: 10 INJECTION, SOLUTION INTRAMUSCULAR; INTRAVENOUS at 00:49

## 2022-01-31 RX ADMIN — METOLAZONE 5 MG: 5 TABLET ORAL at 08:23

## 2022-01-31 RX ADMIN — OXYCODONE HYDROCHLORIDE 7.5 MG: 5 TABLET ORAL at 21:42

## 2022-01-31 RX ADMIN — TORSEMIDE 40 MG: 20 TABLET ORAL at 10:59

## 2022-01-31 RX ADMIN — SODIUM CHLORIDE 25 ML: 9 INJECTION, SOLUTION INTRAVENOUS at 14:45

## 2022-01-31 RX ADMIN — SODIUM CHLORIDE 25 ML: 9 INJECTION, SOLUTION INTRAVENOUS at 11:05

## 2022-01-31 RX ADMIN — CARVEDILOL 3.12 MG: 6.25 TABLET, FILM COATED ORAL at 08:24

## 2022-01-31 RX ADMIN — SODIUM CHLORIDE, PRESERVATIVE FREE 10 ML: 5 INJECTION INTRAVENOUS at 00:49

## 2022-01-31 RX ADMIN — FUROSEMIDE 80 MG: 10 INJECTION, SOLUTION INTRAMUSCULAR; INTRAVENOUS at 08:48

## 2022-01-31 ASSESSMENT — PAIN DESCRIPTION - FREQUENCY
FREQUENCY: CONTINUOUS

## 2022-01-31 ASSESSMENT — PAIN DESCRIPTION - ONSET
ONSET: ON-GOING

## 2022-01-31 ASSESSMENT — PAIN DESCRIPTION - PAIN TYPE
TYPE: ACUTE PAIN
TYPE: ACUTE PAIN
TYPE: CHRONIC PAIN

## 2022-01-31 ASSESSMENT — PAIN SCALES - GENERAL
PAINLEVEL_OUTOF10: 8
PAINLEVEL_OUTOF10: 5
PAINLEVEL_OUTOF10: 10

## 2022-01-31 ASSESSMENT — PAIN DESCRIPTION - PROGRESSION
CLINICAL_PROGRESSION: GRADUALLY WORSENING
CLINICAL_PROGRESSION: NOT CHANGED
CLINICAL_PROGRESSION: NOT CHANGED

## 2022-01-31 ASSESSMENT — PAIN DESCRIPTION - DESCRIPTORS
DESCRIPTORS: CONSTANT;DISCOMFORT
DESCRIPTORS: SHARP
DESCRIPTORS: BURNING;SHARP

## 2022-01-31 ASSESSMENT — PAIN DESCRIPTION - ORIENTATION: ORIENTATION: MID

## 2022-01-31 ASSESSMENT — PAIN DESCRIPTION - LOCATION
LOCATION: SCROTUM
LOCATION: PENIS
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
## 2022-01-31 NOTE — PROGRESS NOTES
Hospitalist Progress Note      Name:  Arthur Wakefield /Age/Sex: 1950  (70 y.o. male)   MRN & CSN:  [id number] & [id number] Admission Date/Time: 2022  3:40 AM   Location:  [address] [place] [address]- PCP: Trinity Jensen Day: 10    Assessment and Plan:       70 y.o.  male with PMH of DM 2, HFrEF/right-sided CHF, HTN, HLD, SSS, s/p PCM and diabetic neuropathy admitted on 2022 with complaints of increasing lower extremity edema associated with orthopnea with findings of acute on chronic HFrEF along with scrotal lymphedema.     Acute on chronic biventricular decompensated heart failure  Elevated troponin  -Edema significantly improved since admission  -Echo  with preserved EF; severely dilated right ventricle with severe tricuspid regurgitation RVSP of 75   continue 80 IV Lasix 4 times a day as per nephrology  -Metolazone added today  -Continue spironolactone  Cardiologist on board  Nephrologist on board  Monitor renal function and serum electrolytes  Today serum creatinine 2.5      Scrotal lymphedema  Penile and scrotal purulence  -Edema improving daily  -No further purulence  -Scrotal cultures growing MRSA  -Renal ultrasound with extensive soft tissue swelling; no visualized abscess noted  -Urology following we will continue elevation and ice  -Continue IV antibiotics cefepime and vancomycin     History of CKD stage IV  -Nephrology following for diuretic regimen  -Creatinine slight increase from  however currently stable in setting of diuretic therapy for above  -Appreciate nephrology recommendations  Serum creatinine 2.4     Hypoglycemia: DC high scale sliding scale correction and start on low scale sliding scale correction.   On hypoglycemia protocol check blood sugar 3 times per day and before bedtime  Blood sugar is more stable after adjustment of sliding scale correction       Chronic medical conditions: Home medications resumed was contraindicated  Hypertension  Diabetes mellitus type 2  Sick sinus syndrome status post pacer  PAD  Diabetic foot wounds  Status post right-sided crepitation  Chronic normocytic anemia     Reason for continued hospitalization: Continue IV diuresis and iv AB     Diet ADULT DIET; Regular; 5 carb choices (75 gm/meal); Low Sodium (2 gm)   DVT Prophylaxis [] Lovenox, [x]  Heparin, [] SCDs, [] Ambulation   GI Prophylaxis [] PPI,  [] H2 Blocker,  [] Carafate,  [] Diet/Tube Feeds   Code Status Full Code   Disposition Patient requires continued admission due to    MDM [] Low, [] Moderate,[]  High  Patient's risk as above due to      History of Present Illness: The patient was seen and examined at the bedside. Patient denies shortness of breath or chest pain  Feels better  No further restlessness, blood sugar is more stable after adjustment of sliding scale correction  PT OT evaluation ordered,may need acute rehab placement . Objective: Intake/Output Summary (Last 24 hours) at 1/31/2022 0912  Last data filed at 1/31/2022 0756  Gross per 24 hour   Intake --   Output 1500 ml   Net -1500 ml      Vitals:   Vitals:    01/31/22 0805   BP: 135/73   Pulse: 60   Resp: 18   Temp: 97.6 °F (36.4 °C)   SpO2: 90%     Physical Exam:   GEN Awake.  Alert , not in respiratory distress, not in pain  HEENT: PEERLA, , supple neck,   Chest: air entry equal bilaterally, no wheezing or crepitation  Heart: S1 and S2 heard, no murmur, no gallop or rub, regular rate  Abdomen: soft, ND , Nt, +BS  Extremities: no cyanosis, tenderness or erythema, peripheral pulses audible, scrotal lymphedema with nonpitting swelling of both leg  Neurology: alert, oriented x3, able to move 4 limbs    Medications:   Medications:    insulin lispro  0-6 Units SubCUTAneous TID     traZODone  50 mg Oral Nightly    vitamin D  50,000 Units Oral Weekly    tiotropium  2 puff Inhalation Nightly    cefepime  2,000 mg IntraVENous Q24H    metOLazone  5 mg Oral Daily    spironolactone  12.5 mg Oral Daily    vancomycin (VANCOCIN) intermittent dosing (placeholder)   Other See Admin Instructions    metaxalone  400 mg Oral TID    morphine  2 mg IntraVENous Once    furosemide  80 mg IntraVENous 4x Daily    atorvastatin  40 mg Oral Nightly    carvedilol  3.125 mg Oral BID    clopidogrel  75 mg Oral Daily    sodium chloride flush  5-40 mL IntraVENous 2 times per day    heparin (porcine)  5,000 Units SubCUTAneous 3 times per day    lisinopril  5 mg Oral Daily      Infusions:    dextrose      dextrose      sodium chloride 25 mL (01/26/22 1211)    dextrose 100 mL/hr (01/29/22 0623)     PRN Meds: glucose, 15 g, PRN  dextrose, 12.5 g, PRN  glucagon (rDNA), 1 mg, PRN  dextrose, 100 mL/hr, PRN  oxyCODONE, 7.5 mg, Q4H PRN  albuterol sulfate HFA, 2 puff, Q4H PRN  sodium chloride flush, 5-40 mL, PRN  sodium chloride, 25 mL, PRN  ondansetron, 4 mg, Q8H PRN   Or  ondansetron, 4 mg, Q6H PRN  polyethylene glycol, 17 g, Daily PRN  acetaminophen, 650 mg, Q6H PRN   Or  acetaminophen, 650 mg, Q6H PRN  dextrose, 100 mL/hr, PRN  acetaminophen, 650 mg, Q6H PRN  ondansetron, 4 mg, Q6H PRN  benzonatate, 100 mg, TID PRN  melatonin, 3 mg, Nightly PRN  ALPRAZolam, 0.25 mg, Nightly PRN  aluminum & magnesium hydroxide-simethicone, 30 mL, Q6H PRN  morphine, 2 mg, Q4H PRN  dextrose bolus (hypoglycemia), 125 mL, PRN   Or  dextrose bolus (hypoglycemia), 250 mL, PRN          Electronically signed by Ramirez Sanchez MD on 1/31/2022 at 9:12 AM

## 2022-01-31 NOTE — PROGRESS NOTES
Nephrology Progress Note  1/31/2022 5:23 PM        Subjective:   Admit Date: 1/22/2022  PCP: Ilan Gutierrez    Interval History: Patient seen in early morning, this is a late entry  Weekend events also briefly reviewed    Diet: Reasonable    ROS: Edema much better  No overt shortness of breath  Urine output about 2.6 L for the last shift  No fever and acceptable blood pressure    Data:     Current meds:    torsemide  40 mg Oral Daily    metOLazone  2.5 mg Oral Daily    insulin lispro  0-6 Units SubCUTAneous TID WC    traZODone  50 mg Oral Nightly    vitamin D  50,000 Units Oral Weekly    tiotropium  2 puff Inhalation Nightly    cefepime  2,000 mg IntraVENous Q24H    spironolactone  12.5 mg Oral Daily    vancomycin (VANCOCIN) intermittent dosing (placeholder)   Other See Admin Instructions    metaxalone  400 mg Oral TID    morphine  2 mg IntraVENous Once    atorvastatin  40 mg Oral Nightly    carvedilol  3.125 mg Oral BID    clopidogrel  75 mg Oral Daily    sodium chloride flush  5-40 mL IntraVENous 2 times per day    heparin (porcine)  5,000 Units SubCUTAneous 3 times per day    lisinopril  5 mg Oral Daily      dextrose      dextrose      sodium chloride 25 mL (01/31/22 1445)    dextrose 100 mL/hr (01/29/22 6877)         I/O last 3 completed shifts:  In: -   Out: 5100 [Urine:5100]    CBC:   Recent Labs     01/29/22  0340 01/30/22  0918 01/31/22  0530   WBC 9.1 10.4 9.4   HGB 8.5* 8.7* 8.7*    280 279          Recent Labs     01/29/22  0340 01/30/22  0918 01/31/22  0530    136 139   K 4.8 4.1 4.2   CL 97* 92* 93*   CO2 31 36* 36*   BUN 55* 57* 58*   CREATININE 2.7* 2.4* 2.5*   GLUCOSE 49* 192* 236*       Lab Results   Component Value Date    CALCIUM 8.2 (L) 01/31/2022    PHOS 3.8 01/27/2022       Objective:     Vitals: /61   Pulse 62   Temp 97.7 °F (36.5 °C) (Oral)   Resp 16   Ht 6' (1.829 m)   Wt 250 lb 7.1 oz (113.6 kg)   SpO2 (!) 85%   BMI 33.97 kg/m² General appearance: Alert, awake and oriented  HEENT: Positive conjunctival pallor  Neck: Supple  Lungs: Coarse breath sound  Heart: Seems regular rate and rhythm, left chest wall cardiac device which is nontender  Abdomen: Soft  Extremities: Significant improvement of leg edema, penoscrotal edema  Stasis dermatitis and chronic toe wound      Problem List :         Impression :     1. Acute kidney injury with underlying CKD stage IV A1-mainly from fluid overload-stable-  2. Severe biventricular failure with significant scrotal and penile edema-much better now  3. Underlying atherosclerotic cardiovascular disease  4. Chronic leg wound  5. Underlying hypertension    Recommendation/Plan  :     1. Switch to oral diuretics  2. May discharge from kidney standpoint  3. Discontinue Fuller  4. I have discussed with his daughter-he was very weak at home , so we will see the physical therapist evaluation  5.  Otherwise follow clinically and biochemically      Roger Ortiz MD MD

## 2022-01-31 NOTE — PROGRESS NOTES
[address] [facility]  consulted by Dr. Edson Montemayor for monitoring and adjustment. Indication for treatment: SSTI  Goal trough: 10-15 mcg/mL  AUC/RJ:  <500    Pertinent Laboratory Values:   Temp Readings from Last 3 Encounters:   01/31/22 97.6 °F (36.4 °C) (Oral)   01/16/22 97 °F (36.1 °C) (Oral)   01/06/22 98.3 °F (36.8 °C) (Temporal)     Recent Labs     01/29/22  0340 01/30/22  0918 01/31/22  0530   WBC 9.1 10.4 9.4     Recent Labs     01/29/22  0340 01/30/22  0918 01/31/22  0530   BUN 55* 57* 58*   CREATININE 2.7* 2.4* 2.5*     Estimated Creatinine Clearance: 35 mL/min (A) (based on SCr of 2.5 mg/dL (H)). Intake/Output Summary (Last 24 hours) at 1/31/2022 1246  Last data filed at 1/31/2022 1129  Gross per 24 hour   Intake 10 ml   Output 2200 ml   Net -2190 ml     Pertinent Cultures:  Date    Source    Results  1/23   Wound    MRSA    Vancomycin level:   TROUGH:  No results for input(s): VANCOTROUGH in the last 72 hours. RANDOM:    Recent Labs     01/29/22 0340 01/31/22  0530   VANCORANDOM 17.0 16.7       Assessment:  · SCr, BUN, and urine output:   · NANCY on CKD, Scr slightly improved @ 2.5 today  · Day(s) of therapy: 9  · Vancomycin concentration:   · 1/26: 24.2  · 1/27: 17.3, 29 hour level post 1250mg dose  · 1/29: 17, appropriate to re-dose  · 1/31 - 16.7, 32 hour level post 1000mg dose, ok to re-dose    Plan:  · Intermittent vancomycin dosing with CKD  · Give vancomycin 1000mg ivpb x1 dose today  · Recheck the vanco level in 2 days  · Pharmacy will continue to monitor patient and adjust therapy as indicated    VANCOMYCIN CONCENTRATION SCHEDULED FOR 2/2 @ 0600    Thank you for the consult,  Jennifer Cantu.  Sha Velazquez, [address] [place] [address]  1/31/2022 12:46 PM

## 2022-01-31 NOTE — CONSULTS
[address] [facility] PHYSICAL THERAPY EVALUATION  Shoshana Magallon, 1950, 4120/4120-A, 1/31/2022    History  [place]:  The primary encounter diagnosis was Leg edema. Diagnoses of Scrotal edema, Other hypervolemia, and Acute on chronic congestive heart failure, unspecified heart failure type [facility]) were also pertinent to this visit. Patient  has a past medical history of Acid reflux, Acute MI ([address] Arthritis, Broken teeth, CAD (coronary artery disease), Cardiomyopathy ([address] CHF (congestive heart failure) ([address] Chronic back pain, Chronic kidney disease, Diabetes mellitus ([address] Diabetic neuropathy ([address] H/O cardiovascular stress test, H/O Doppler ultrasound, H/O percutaneous left heart catheterization, History of irregular heartbeat, History of syncope, Hyperlipidemia, Hypertension, Leg swelling, Necrotic toes (HCC), Neuropathy, neuropathy, PAD (peripheral artery disease) ([address] PVD (peripheral vascular disease) ([address] Sick sinus syndrome ([address] Sleep apnea, Spinal stenosis, Teeth missing, Type 2 diabetes mellitus without complication ([address] and WD-Chronic foot ulcer, left, with necrosis of bone ([address] Patient  has a past surgical history that includes Coronary angioplasty with stent; Dental surgery; Colonoscopy (08/04/2016); pacemaker placement (06/04/2010); vascular surgery; colectomy (Right, 08/26/2016); Toe amputation (Right, 09/12/2017); Toe amputation (Right, 01/09/2018); Cardiac catheterization; Cardiac defibrillator placement (06/04/2010); Coronary angioplasty; Cardiac catheterization (07/14/2017); Cardiac catheterization (11/20/2018); Toe amputation (Left, 12/26/2020); and IR TUNNELED CVC PLACE WO SQ PORT/PUMP > 5 YEARS (6/14/2021). Subjective:  Patient states:  \"I've been waiting for therapy. \"    Pain:  7/10 pain in bilateral LEs.     Communication with other providers:  Handoff to RN  Restrictions: general precautions, fall risk    Home Setup/Prior level of function   Pt states he lives in a one level home with multiple family members. Pt states he has a ramped entry way and his daughter assists with bed mobility. Pt states he only stand pivots to/from power chair for mobility. Examination of body systems (includes body structures/functions, activity/participation limitations):  · Observation:  Pt supine in bed upon arrival and agreeable to therapy  · Vision:  [facility]  · Hearing:  [place]  · Cardiopulmonary:  No O2 needs  · Cognition: impaired, see OT/SLP note for further evaluation. Musculoskeletal  · ROM R/L:  WFL. · Strength R/L:  4/5, moderate impairment in function and endurance. · Neuro:  NT      Mobility:  · Rolling L/R:  supervision  · Supine to sit:  Min A with assist at trunk and cues for sequencing  · Sit to supine: min A with assist at R LE and cues for sequencing  · Transfers: Pt completed stand pivot without a device from bed to chair and from chair to bed with cues for sequencing and CGA. Pt seems to be functioning close to baseline  · Sitting balance:  good. · Standing balance:  fair. · Gait: NT due to pt states he is not ambulatory at baseline    [facility] 6 Clicks Inpatient Mobility:  AM-PAC Inpatient Mobility Raw Score : 14    Safety: patient left supine in bed with alarm on, call light within reach, RN notified, gait belt used. Assessment:  Pt is a 70 y.o. male admitted to the hospital for acute on chronic HFrEF. Pt is typically independent with stand pivot transfers to/from electric scooter and does not ambulate at baseline. Pt is currently performing bed mobility min A and transferring CGA. Pt seems to be functioning close to baseline and would benefit from continued acute care PT as well as 24/7 supervision and [facility] PT upon discharge. Complexity: moderate    Prognosis: Good, no significant barriers to participation at this time.      Plan Times per week: 2+/week     Equipment: none    Goals:  Short term goals  Time Frame for Short term goals: 1 week  Short term goal 1: Pt to complete all bed mobility SBA  Short term goal 2: Pt to complete stand pivot with LRAD SBA  Short term goal 3: Pt to complete STS SBA       Treatment plan:  Bed mobility, transfers, balance, gait, TA, TX    Recommendations for NURSING mobility: stand pivot with gait belt    Time:   Time in: 1412  Time out: 1430  Timed treatment minutes: 8  Total time: 18    Electronically signed by:    Kenton Casanova PT  1/31/2022, 4:00 PM

## 2022-01-31 NOTE — CONSULTS
[id number] [facility] Wound Ostomy Continence Nurse  Consult Note       Cary Almanza  AGE: 70 y.o. GENDER: male  : 1950  TODAY'S DATE:  2022    Subjective:     Reason for  Evaluation and Assessment: wound care evalLea Almanza is a 70 y.o. male referred by:   [x] Physician  [] Nursing  [] Other:     Wound Identification:  Wound Type: venous, diabetic and pressure  Contributing Factors: edema, diabetes, chronic pressure and decreased mobility        PAST MEDICAL HISTORY        Diagnosis Date    Acid reflux     Acute MI ([address] ,     Arthritis     Back    Broken teeth     Upper Front    CAD (coronary artery disease)     Sees Dr. Jerry Davila [facility])     per old chart    CHF (congestive heart failure) ([facility])     Chronic back pain     Chronic kidney disease     STAGE 3 KIDNEY FAILURE- \"from my diabetes- do not follow with any one- have seen Dr Ferdinand Dick in the past\"    Diabetes mellitus ([address] Dx 1965    per old chart pt has been diabetic since age 13    Diabetic neuropathy ([address]     \"in my feet\"    H/O cardiovascular stress test 2016    H/O Doppler ultrasound 2018    Moderate disease of the right lower extremity with an JALEN of 0.72.   Moderate to severe disease of the left lower extremity with an JALEN of 0.55.   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    Neuropathy     both feet    neuropathy     PAD (peripheral artery disease) ([address] 2018    PVD (peripheral vascular disease) ([address]     Sick sinus syndrome ([address]     Sleep apnea     \"sleep study 3 yrs ago- could not tolerate the cpap made me too dry\"    Spinal stenosis     Teeth missing     Upper And Lower    Type 2 diabetes mellitus without complication ([facility]     WD-Chronic foot ulcer, left, with necrosis of bone ([facility] [address] [address] 11/12/2021       PAST SURGICAL HISTORY    Past Surgical History:   Procedure Laterality Date    CARDIAC CATHETERIZATION      per old chart done 10/2014    CARDIAC CATHETERIZATION  07/14/2017    with angiography of leg    CARDIAC CATHETERIZATION  11/20/2018    PATENT STENTS OF ALL THREE MAJOR VESSELS    CARDIAC DEFIBRILLATOR PLACEMENT  06/04/2010    [employer] [address] Defibrillator Implanted    COLECTOMY Right 08/26/2016    laparascopic; robotic assisted    COLONOSCOPY  08/04/2016    CORONARY ANGIOPLASTY      \"15 Heart Stents\"    CORONARY ANGIOPLASTY WITH STENT PLACEMENT      per old chart had angio with stent to circumflex and obtuse marginal artery at [facility] 5/2010( old chart also gives hx of stent placement done 2000,2004 and 2005)    DENTAL SURGERY      Teeth Extracted In Past    IR TUNNELED CATHETER PLACEMENT GREATER THAN 5 YEARS  6/14/2021    IR TUNNELED CATHETER PLACEMENT GREATER THAN 5 YEARS 6/14/2021 [facility] SPECIAL PROCEDURES    PACEMAKER PLACEMENT  06/04/2010    [employer] [address] Defibrillator Implanted    TOE AMPUTATION Right 09/12/2017    Rt 3rd toe    TOE AMPUTATION Right 01/09/2018     Right 5th toe amputation and Toenails trimmed left 2,3,4 and 5th toes    TOE AMPUTATION Left 12/26/2020    LEFT GREAT TOE AMPUTATION performed by Jane Foote MD at [facility]      per old chart had balloon angioplasty right superfical femoral artery,right popliteal artery,,right ant.tibial artery, right tibioperoneal trunk, and right post.tibial artery wna stent placement right popliteal artery and superfical femoral artery 7/2012       FAMILY HISTORY    Family History   Problem Relation Age of Onset    Diabetes Mother     Stroke Mother     High Blood Pressure Mother     Vision Loss Mother  Cancer Father         Prostate Cancer    Diabetes Sister     Neuropathy Sister     Other Sister         \"Breathing Problems\"    Heart Disease Sister     Early Death Sister 62        Heart Complications    Cancer Brother         \"Stomach Cancer\"    High Blood Pressure Brother     Diabetes Brother     Heart Disease Brother     High Blood Pressure Brother     Cancer Son         \"Testicle Cancer\"       SOCIAL HISTORY    Social History     Tobacco Use    Smoking status: Former Smoker     Packs/day: 0.25     Years: 36.00     Pack years: 9.00     Types: Cigars     Start date: 1980     Quit date: 10/22/2021     Years since quittin.2    Smokeless tobacco: Never Used    Tobacco comment: Client states he has stopped smoking   Vaping Use    Vaping Use: Never used   Substance Use Topics    Alcohol use: No    Drug use: Yes     Types: Marijuana (Weed)       ALLERGIES    Allergies   Allergen Reactions    Pcn [Penicillins] Hives    Fentanyl Itching       MEDICATIONS    No current facility-administered medications on file prior to encounter. Current Outpatient Medications on File Prior to Encounter   Medication Sig Dispense Refill    diclofenac sodium (VOLTAREN) 1 % GEL Apply 4 g topically 2 times daily 120 g 5    gabapentin (NEURONTIN) 300 MG capsule TAKE 1 CAPSULE BY MOUTH 3 TIMES DAILY FOR 90 DAYS.  90 capsule 3    amLODIPine (NORVASC) 10 MG tablet Take 1 tablet by mouth daily 30 tablet 5    doxazosin (CARDURA) 4 MG tablet Take 1 tablet by mouth 2 times daily 60 tablet 3    metaxalone (SKELAXIN) 800 MG tablet Take 800 mg by mouth 3 times daily      SPIRIVA HANDIHALER 18 MCG inhalation capsule       insulin lispro, 1 Unit Dial, (HUMALOG KWIKPEN) 100 UNIT/ML SOPN Inject into the skin 2 times daily Sliding scale dose      chlorthalidone (HYGROTEN) 50 MG tablet Take 1 tablet by mouth daily 30 tablet 0    atorvastatin (LIPITOR) 40 MG tablet Take 1 tablet by mouth nightly 30 tablet 3    carvedilol (COREG) 3.125 MG tablet Take 1 tablet by mouth 2 times daily 60 tablet 3    albuterol sulfate HFA (VENTOLIN HFA) 108 (90 Base) MCG/ACT inhaler Inhale 2 puffs into the lungs every 4 hours as needed for Wheezing 1 Inhaler 3    Calcium Alginate (ALGICELL CALCIUM DRESSING 4\"X8) MISC Apply 1 Device topically daily 30 each 3    PROMOGRAN COREY WOUND DRESSING MATRIX Apply topically Apply to left daily and cover with gauze 1 Package 3    clopidogrel (PLAVIX) 75 MG tablet Take 1 tablet by mouth daily 30 tablet 11         Objective:      /73   Pulse 60   Temp 97.6 °F (36.4 °C) (Oral)   Resp 18   Ht 6' (1.829 m)   Wt 250 lb 7.1 oz (113.6 kg)   SpO2 90%   BMI 33.97 kg/m²   Pablo Risk Score: Pablo Scale Score: 14    LABS    CBC:   Lab Results   Component Value Date    WBC 9.4 01/31/2022    RBC 3.40 01/31/2022    HGB 8.7 01/31/2022    HCT 29.2 01/31/2022    MCV 85.9 01/31/2022    MCH 25.6 01/31/2022    MCHC 29.8 01/31/2022    RDW 21.1 01/31/2022     01/31/2022    MPV 9.9 01/31/2022     CMP:    Lab Results   Component Value Date     01/31/2022    K 4.2 01/31/2022    K 5.1 01/10/2018    CL 93 01/31/2022    CO2 36 01/31/2022    BUN 58 01/31/2022    CREATININE 2.5 01/31/2022    GFRAA 31 01/31/2022    LABGLOM 26 01/31/2022    GLUCOSE 236 01/31/2022    PROT 6.5 01/31/2022    PROT 6.3 01/21/2013    LABALBU 2.6 01/31/2022    CALCIUM 8.2 01/31/2022    BILITOT 1.1 01/31/2022    ALKPHOS 111 01/31/2022    AST 15 01/31/2022    ALT 12 01/31/2022     Albumin:    Lab Results   Component Value Date    LABALBU 2.6 01/31/2022     PT/INR:    Lab Results   Component Value Date    PROTIME 13.3 08/30/2021    PROTIME 11.2 09/08/2011    INR 1.03 08/30/2021     HgBA1c:    Lab Results   Component Value Date    LABA1C 8.0 01/29/2022         Assessment:     Patient Active Problem List   Diagnosis    PAD (peripheral artery disease) (HCC)    Chronic coronary artery disease    Biventricular ICD (implantable cardioverter-defibrillator) in place    Chronic combined systolic and diastolic heart failure (HCC)    Chronic kidney disease, stage III (moderate) ([facility])    Mixed hyperlipidemia    Sick sinus syndrome (HCC)    Type 2 diabetes mellitus with diabetic polyneuropathy ([address]    Spinal stenosis of lumbar region    Obesity, Class I, BMI 30-34.9    S/P partial colectomy    Tubulovillous adenoma of colon    Microalbuminuria    WD-PVD (peripheral vascular disease) ([facility])    Limb ischemia    Necrotic toes ([facility])    Toe gangrene ([facility])    Diabetic foot infection ([address]    Chronic kidney disease (CKD) stage G3a/A2, moderately decreased glomerular filtration rate (GFR) between 45-59 mL/min/1.73 square meter and albuminuria creatinine ratio between  mg/g ([facility])    Edema    ICD (implantable cardioverter-defibrillator) battery depletion    Hyperkalemia    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[facility])    Ischemia of toe    Acute kidney injury ([facility])    Fluid overload    DM (diabetes mellitus) ([facility])    Precordial pain    Acute chest pain    Unstable angina ([facility])    Chronic kidney disease (CKD) stage G3a/A3, moderately decreased glomerular filtration rate (GFR) between 45-59 mL/min/1.73 square meter and albuminuria creatinine ratio greater than 300 mg/g (HCC)    Cardiomyopathy (HCC)    Diabetic neuropathy (HCC)    HTN (hypertension)    Epigastric pain    Acute on chronic congestive heart failure (HCC)    Leg edema    Acute on chronic systolic CHF (congestive heart failure) ([facility])    Diabetic foot ulcer with osteomyelitis ([address]    Visit for wound check    Moderate malnutrition ([address]    Long term (current) use of antibiotics    WD-Diabetic ulcer of toe of right foot associated with type 2 diabetes mellitus, with fat layer exposed ([address]    WD-Diabetic ulcer of toe of left foot associated with type 2 diabetes mellitus, with fat layer exposed ([address]    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    Persistent wound pain    Receiving intravenous antibiotic treatment as outpatient    Acute on chronic respiratory failure with hypoxemia ([address]    WD-Chronic foot ulcer, left, with necrosis of bone ([address]    Acute on chronic HFrEF (heart failure with reduced ejection fraction) [facility]    Scrotal edema    Hypertensive urgency       Measurements:  Wound 07/06/17 Other (Comment) Toe (Comment  which one) (Active)   Number of days: 1669       Wound 12/10/20 Foot Left;Lateral fluid filled blister (Active)   Number of days: 416       Wound 10/30/21   #1 Left Dorsal Great Toe (Active)   Wound Image   01/31/22 1040   Wound Etiology Diabetic 01/31/22 1040   Dressing Status New dressing applied 01/31/22 1040   Wound Cleansed Cleansed with saline 01/31/22 1040   Dressing/Treatment ABD;Hydrofiber Ag;Roll gauze;Tape/Soft cloth adhesive tape 01/31/22 1040   Offloading for Diabetic Foot Ulcers No offloading required 01/29/22 0830   Wound Length (cm) 0.9 cm 01/31/22 1040   Wound Width (cm) 2 cm 01/31/22 1040   Wound Depth (cm) 0.9 cm 01/31/22 1040   Wound Surface Area (cm^2) 1.8 cm^2 01/31/22 1040   Change in Wound Size % (l*w) 42.86 01/31/22 1040   Wound Volume (cm^3) 1.62 cm^3 01/31/22 1040   Wound Healing % -157 01/31/22 1040   Post-Procedure Length (cm) 1.5 cm 01/06/22 1128   Post-Procedure Width (cm) 2 cm 01/06/22 1128   Post-Procedure Depth (cm) 0.5 cm 01/06/22 1128   Post-Procedure Surface Area (cm^2) 3 cm^2 01/06/22 1128   Post-Procedure Volume (cm^3) 1.5 cm^3 01/06/22 1128   Distance Tunneling (cm) 0 cm 01/31/22 1040   Tunneling Position ___ O'Clock 0 01/31/22 1040   Undermining Starts ___ O'Clock 0 01/31/22 1040   Undermining Ends___ O'Clock 0 01/31/22 1040   Undermining Maxium Distance (cm) 0 01/31/22 1040   Wound Assessment Slough;[place]/red 01/29/22 0830   Drainage Amount Moderate 01/31/22 1040   Drainage Description Serosanguinous; Yellow 01/31/22 1040   Odor None 01/31/22 1040   Dina-wound Assessment Dry/flaky 01/31/22 1040   Margins Defined edges 01/31/22 1040 Wound Thickness Description not for Pressure Injury Full thickness 01/31/22 1040   Number of days: 93       Wound 10/30/21   #2 Left Dorsal Second Toe  (Active)   Wound Image   01/31/22 1040   Wound Etiology Diabetic 01/31/22 1040   Dressing Status New dressing applied 01/31/22 1040   Wound Cleansed Cleansed with saline 01/31/22 1040   Dressing/Treatment ABD;Hydrofiber Ag;Roll gauze;Tape/Soft cloth adhesive tape 01/31/22 1040   Offloading for Diabetic Foot Ulcers No offloading required 01/29/22 0830   Wound Length (cm) 1.5 cm 01/31/22 1040   Wound Width (cm) 1.3 cm 01/31/22 1040   Wound Depth (cm) 0.3 cm 01/31/22 1040   Wound Surface Area (cm^2) 1.95 cm^2 01/31/22 1040   Change in Wound Size % (l*w) 30.36 01/31/22 1040   Wound Volume (cm^3) 0.585 cm^3 01/31/22 1040   Wound Healing % -109 01/31/22 1040   Post-Procedure Length (cm) 2 cm 01/06/22 1128   Post-Procedure Width (cm) 1.5 cm 01/06/22 1128   Post-Procedure Depth (cm) 0.3 cm 01/06/22 1128   Post-Procedure Surface Area (cm^2) 3 cm^2 01/06/22 1128   Post-Procedure Volume (cm^3) 0.9 cm^3 01/06/22 1128   Distance Tunneling (cm) 0 cm 01/31/22 1040   Tunneling Position ___ O'Clock 0 01/31/22 1040   Undermining Starts ___ O'Clock 0 01/31/22 1040   Undermining Ends___ O'Clock 0 01/31/22 1040   Undermining Maxium Distance (cm) 0 01/31/22 1040   Wound Assessment Exposed structure bone;Pink/red;Slough 01/29/22 0830   Drainage Amount Moderate 01/31/22 1040   Drainage Description Serosanguinous; Yellow 01/31/22 1040   Odor None 01/31/22 1040   Dina-wound Assessment Dry/flaky 01/31/22 1040   Margins Defined edges 01/31/22 1040   Wound Thickness Description not for Pressure Injury Full thickness 01/31/22 1040   Number of days: 93       Wound 10/30/21 #3 Right Great Toe (Active)   Wound Image   01/31/22 1040   Wound Etiology Diabetic 01/31/22 1040   Dressing Status New dressing applied 01/31/22 1040   Wound Cleansed Cleansed with saline 01/31/22 1040   Dressing/Treatment ABD;Hydrofiber Ag;Roll gauze;Tape/Soft cloth adhesive tape 01/31/22 1040   Offloading for Diabetic Foot Ulcers No offloading required 01/29/22 0830   Wound Length (cm) 1 cm 01/31/22 1040   Wound Width (cm) 1.5 cm 01/31/22 1040   Wound Depth (cm) 0.5 cm 01/31/22 1040   Wound Surface Area (cm^2) 1.5 cm^2 01/31/22 1040   Change in Wound Size % (l*w) -200 01/31/22 1040   Wound Volume (cm^3) 0.75 cm^3 01/31/22 1040   Wound Healing % -650 01/31/22 1040   Post-Procedure Length (cm) 1 cm 01/06/22 1128   Post-Procedure Width (cm) 1.1 cm 01/06/22 1128   Post-Procedure Depth (cm) 0.2 cm 01/06/22 1128   Post-Procedure Surface Area (cm^2) 1.1 cm^2 01/06/22 1128   Post-Procedure Volume (cm^3) 0.22 cm^3 01/06/22 1128   Distance Tunneling (cm) 0 cm 01/31/22 1040   Tunneling Position ___ O'Clock 0 01/31/22 1040   Undermining Starts ___ O'Clock 0 01/31/22 1040   Undermining Ends___ O'Clock 0 01/31/22 1040   Undermining Maxium Distance (cm) 0 01/31/22 1040   Wound Assessment Pink/red;Slough 01/29/22 0830   Drainage Amount Moderate 01/31/22 1040   Drainage Description Serosanguinous; Yellow 01/31/22 1040   Odor None 01/31/22 1040   Dina-wound Assessment Dry/flaky 01/31/22 1040   Margins Defined edges 01/31/22 1040   Wound Thickness Description not for Pressure Injury Full thickness 01/31/22 1040   Number of days: 93       Wound 01/06/22 Pretibial Left;Posterior # 4 (Active)   Wound Image   01/31/22 1040   Wound Etiology Venous 01/31/22 1040   Dressing Status New dressing applied 01/31/22 1040   Wound Cleansed Cleansed with saline; Other (Comment) 01/31/22 1040   Dressing/Treatment ABD;Hydrofiber Ag;Roll gauze;Tape/Soft cloth adhesive tape 01/31/22 1040   Wound Length (cm) 2.5 cm 01/31/22 1040   Wound Width (cm) 0.7 cm 01/31/22 1040   Wound Depth (cm) 0.1 cm 01/31/22 1040   Wound Surface Area (cm^2) 1.75 cm^2 01/31/22 1040   Change in Wound Size % (l*w) 98.54 01/31/22 1040   Wound Volume (cm^3) 0.175 cm^3 01/31/22 1040   Wound Healing % 99 01/31/22 1040   Post-Procedure Length (cm) 10 cm 01/06/22 1129   Post-Procedure Width (cm) 12 cm 01/06/22 1129   Post-Procedure Depth (cm) 0.1 cm 01/06/22 1129   Post-Procedure Surface Area (cm^2) 120 cm^2 01/06/22 1129   Post-Procedure Volume (cm^3) 12 cm^3 01/06/22 1129   Distance Tunneling (cm) 0 cm 01/31/22 1040   Tunneling Position ___ O'Clock 0 01/31/22 1040   Undermining Starts ___ O'Clock 0 01/31/22 1040   Undermining Ends___ O'Clock 0 01/31/22 1040   Undermining Maxium Distance (cm) 0 01/31/22 1040   Wound Assessment Ruptured blister 01/29/22 0830   Drainage Amount Moderate 01/31/22 1040   Drainage Description Serosanguinous; Yellow 01/31/22 1040   Odor None 01/31/22 1040   Dina-wound Assessment Dry/flaky 01/31/22 1040   Margins Defined edges 01/31/22 1040   Wound Thickness Description not for Pressure Injury Full thickness 01/31/22 1040   Number of days: 25       Wound 01/22/22 Heel Left open area aprox [id number] (Active)   Dressing Status New dressing applied 01/31/22 1040   Wound Cleansed Cleansed with saline 01/31/22 1040   Dressing/Treatment ABD;Roll gauze;Tape/Soft cloth adhesive tape 01/31/22 1040   Wound Length (cm) 0 cm 01/31/22 1040   Wound Width (cm) 0 cm 01/31/22 1040   Wound Depth (cm) 0 cm 01/31/22 1040   Wound Surface Area (cm^2) 0 cm^2 01/31/22 1040   Wound Volume (cm^3) 0 cm^3 01/31/22 1040   Distance Tunneling (cm) 0 cm 01/31/22 1040   Tunneling Position ___ O'Clock 0 01/31/22 1040   Undermining Starts ___ O'Clock 0 01/31/22 1040   Undermining Ends___ O'Clock 0 01/31/22 1040   Undermining Maxium Distance (cm) 0 01/31/22 1040   Drainage Amount None 01/31/22 1040   Dina-wound Assessment Intact 01/31/22 1040   Margins Attached edges 01/31/22 1040   Number of days: 8       Wound 01/31/22 Coccyx (Active)   Wound Image   01/31/22 1040   Wound Etiology Pressure Stage  2 01/31/22 1040   Dressing Status New dressing applied 01/31/22 1040   Wound Cleansed Cleansed with saline 01/31/22 1040 Dressing/Treatment Collagen;Silicone border 72/99/48 1040   Wound Length (cm) 1.2 cm 01/31/22 1040   Wound Width (cm) 0.5 cm 01/31/22 1040   Wound Depth (cm) 0.1 cm 01/31/22 1040   Wound Surface Area (cm^2) 0.6 cm^2 01/31/22 1040   Wound Volume (cm^3) 0.06 cm^3 01/31/22 1040   Distance Tunneling (cm) 0 cm 01/31/22 1040   Tunneling Position ___ O'Clock 0 01/31/22 1040   Undermining Starts ___ O'Clock 0 01/31/22 1040   Undermining Ends___ O'Clock 0 01/31/22 1040   Undermining Maxium Distance (cm) 0 01/31/22 1040   Wound Assessment Pink/red 01/31/22 1040   Drainage Amount Moderate 01/31/22 1040   Drainage Description Serosanguinous 01/31/22 1040   Odor None 01/31/22 1040   Dina-wound Assessment Intact 01/31/22 1040   Margins Defined edges 01/31/22 1040   Wound Thickness Description not for Pressure Injury Partial thickness 01/31/22 1040   Number of days: 0       Response to treatment:  With complaints of pain. Pain Assessment:  Severity:  moderate  Quality of pain: sore  Wound Pain Timing/Severity: wound care   Premedicated: no    Plan:     Plan of Care: Wound 10/30/21   #2 Left Dorsal Second Toe -Dressing/Treatment: ABD,Hydrofiber Ag,Roll gauze,Tape/Soft cloth adhesive tape  Wound 01/06/22 Pretibial Left;Posterior # 4-Dressing/Treatment: ABD,Hydrofiber Ag,Roll gauze,Tape/Soft cloth adhesive tape  Wound 10/30/21   #1 Left Dorsal Great Toe-Dressing/Treatment: ABD,Hydrofiber Ag,Roll gauze,Tape/Soft cloth adhesive tape  Wound 01/31/22 Coccyx-Dressing/Treatment: Collagen,Silicone border  Wound 01/22/22 Heel Left open area aprox 3cmX3cm-Dressing/Treatment: ABD,Roll gauze,Tape/Soft cloth adhesive tape  Wound 10/30/21 #3 Right Great Toe-Dressing/Treatment: ABD,Hydrofiber Ag,Roll gauze,Tape/Soft cloth adhesive tape     Patient in bed agreeable to wound care eval. Pt has chronic wounds to rt great toe/lt great toe/lt 2nd toe/left posterior leg diabetic/venous. Cleansed with NS. Measured and pictured.  Applied dressings as

## 2022-02-01 LAB
ALBUMIN SERPL-MCNC: 2.6 GM/DL (ref 3.4–5)
ALP BLD-CCNC: 103 IU/L (ref 40–128)
ALT SERPL-CCNC: 10 U/L (ref 10–40)
ANION GAP SERPL CALCULATED.3IONS-SCNC: 7 MMOL/L (ref 4–16)
AST SERPL-CCNC: 13 IU/L (ref 15–37)
BASOPHILS ABSOLUTE: 0 K/CU MM
BASOPHILS RELATIVE PERCENT: 0.5 % (ref 0–1)
BILIRUB SERPL-MCNC: 0.7 MG/DL (ref 0–1)
BUN BLDV-MCNC: 61 MG/DL (ref 6–23)
CALCIUM SERPL-MCNC: 8.2 MG/DL (ref 8.3–10.6)
CHLORIDE BLD-SCNC: 94 MMOL/L (ref 99–110)
CO2: 39 MMOL/L (ref 21–32)
CREAT SERPL-MCNC: 2.3 MG/DL (ref 0.9–1.3)
DIFFERENTIAL TYPE: ABNORMAL
EOSINOPHILS ABSOLUTE: 0.2 K/CU MM
EOSINOPHILS RELATIVE PERCENT: 1.8 % (ref 0–3)
GFR AFRICAN AMERICAN: 34 ML/MIN/1.73M2
GFR NON-AFRICAN AMERICAN: 28 ML/MIN/1.73M2
GLUCOSE BLD-MCNC: 195 MG/DL (ref 70–99)
GLUCOSE BLD-MCNC: 216 MG/DL (ref 70–99)
GLUCOSE BLD-MCNC: 220 MG/DL (ref 70–99)
GLUCOSE BLD-MCNC: 223 MG/DL (ref 70–99)
GLUCOSE BLD-MCNC: 342 MG/DL (ref 70–99)
HCT VFR BLD CALC: 28.4 % (ref 42–52)
HEMOGLOBIN: 8.4 GM/DL (ref 13.5–18)
IMMATURE NEUTROPHIL %: 0.4 % (ref 0–0.43)
LYMPHOCYTES ABSOLUTE: 0.9 K/CU MM
LYMPHOCYTES RELATIVE PERCENT: 10.7 % (ref 24–44)
MAGNESIUM: 1.6 MG/DL (ref 1.8–2.4)
MCH RBC QN AUTO: 25.8 PG (ref 27–31)
MCHC RBC AUTO-ENTMCNC: 29.6 % (ref 32–36)
MCV RBC AUTO: 87.4 FL (ref 78–100)
MONOCYTES ABSOLUTE: 0.6 K/CU MM
MONOCYTES RELATIVE PERCENT: 7.1 % (ref 0–4)
NUCLEATED RBC %: 0 %
PDW BLD-RTO: 20.8 % (ref 11.7–14.9)
PHOSPHORUS: 2.9 MG/DL (ref 2.5–4.9)
PLATELET # BLD: 255 K/CU MM (ref 140–440)
PMV BLD AUTO: 9.9 FL (ref 7.5–11.1)
POTASSIUM SERPL-SCNC: 4 MMOL/L (ref 3.5–5.1)
RBC # BLD: 3.25 M/CU MM (ref 4.6–6.2)
SEGMENTED NEUTROPHILS ABSOLUTE COUNT: 6.6 K/CU MM
SEGMENTED NEUTROPHILS RELATIVE PERCENT: 79.5 % (ref 36–66)
SODIUM BLD-SCNC: 140 MMOL/L (ref 135–145)
TOTAL IMMATURE NEUTOROPHIL: 0.03 K/CU MM
TOTAL NUCLEATED RBC: 0 K/CU MM
TOTAL PROTEIN: 6.2 GM/DL (ref 6.4–8.2)
WBC # BLD: 8.3 K/CU MM (ref 4–10.5)

## 2022-02-01 PROCEDURE — 97535 SELF CARE MNGMENT TRAINING: CPT

## 2022-02-01 PROCEDURE — 6370000000 HC RX 637 (ALT 250 FOR IP): Performed by: INTERNAL MEDICINE

## 2022-02-01 PROCEDURE — 97166 OT EVAL MOD COMPLEX 45 MIN: CPT

## 2022-02-01 PROCEDURE — 84100 ASSAY OF PHOSPHORUS: CPT

## 2022-02-01 PROCEDURE — 2580000003 HC RX 258: Performed by: INTERNAL MEDICINE

## 2022-02-01 PROCEDURE — 1200000000 HC SEMI PRIVATE

## 2022-02-01 PROCEDURE — 85025 COMPLETE CBC W/AUTO DIFF WBC: CPT

## 2022-02-01 PROCEDURE — 6360000002 HC RX W HCPCS: Performed by: PHYSICIAN ASSISTANT

## 2022-02-01 PROCEDURE — 36415 COLL VENOUS BLD VENIPUNCTURE: CPT

## 2022-02-01 PROCEDURE — 83735 ASSAY OF MAGNESIUM: CPT

## 2022-02-01 PROCEDURE — 82962 GLUCOSE BLOOD TEST: CPT

## 2022-02-01 PROCEDURE — 6370000000 HC RX 637 (ALT 250 FOR IP): Performed by: PHYSICIAN ASSISTANT

## 2022-02-01 PROCEDURE — 2580000003 HC RX 258: Performed by: PHYSICIAN ASSISTANT

## 2022-02-01 PROCEDURE — 6360000002 HC RX W HCPCS: Performed by: INTERNAL MEDICINE

## 2022-02-01 PROCEDURE — 80053 COMPREHEN METABOLIC PANEL: CPT

## 2022-02-01 RX ORDER — MAGNESIUM OXIDE 400 MG/1
400 TABLET ORAL 2 TIMES DAILY
Status: DISCONTINUED | OUTPATIENT
Start: 2022-02-01 | End: 2022-02-02 | Stop reason: HOSPADM

## 2022-02-01 RX ADMIN — ATORVASTATIN CALCIUM 40 MG: 40 TABLET, FILM COATED ORAL at 20:22

## 2022-02-01 RX ADMIN — TRAZODONE HYDROCHLORIDE 50 MG: 50 TABLET ORAL at 20:22

## 2022-02-01 RX ADMIN — SODIUM CHLORIDE, PRESERVATIVE FREE 10 ML: 5 INJECTION INTRAVENOUS at 20:22

## 2022-02-01 RX ADMIN — HEPARIN SODIUM 5000 UNITS: 5000 INJECTION INTRAVENOUS; SUBCUTANEOUS at 13:52

## 2022-02-01 RX ADMIN — METOLAZONE 2.5 MG: 2.5 TABLET ORAL at 09:42

## 2022-02-01 RX ADMIN — SODIUM CHLORIDE, PRESERVATIVE FREE 10 ML: 5 INJECTION INTRAVENOUS at 09:42

## 2022-02-01 RX ADMIN — LISINOPRIL 5 MG: 5 TABLET ORAL at 09:41

## 2022-02-01 RX ADMIN — DIPHENHYDRAMINE HYDROCHLORIDE 5 ML: 12.5 SOLUTION ORAL at 20:21

## 2022-02-01 RX ADMIN — CARVEDILOL 3.12 MG: 6.25 TABLET, FILM COATED ORAL at 09:42

## 2022-02-01 RX ADMIN — CLOPIDOGREL 75 MG: 75 TABLET, FILM COATED ORAL at 09:42

## 2022-02-01 RX ADMIN — CARVEDILOL 3.12 MG: 6.25 TABLET, FILM COATED ORAL at 20:22

## 2022-02-01 RX ADMIN — OXYCODONE HYDROCHLORIDE 7.5 MG: 5 TABLET ORAL at 04:32

## 2022-02-01 RX ADMIN — DIPHENHYDRAMINE HYDROCHLORIDE 5 ML: 12.5 SOLUTION ORAL at 17:05

## 2022-02-01 RX ADMIN — CEFEPIME HYDROCHLORIDE 2000 MG: 2 INJECTION, POWDER, FOR SOLUTION INTRAVENOUS at 09:41

## 2022-02-01 RX ADMIN — OXYCODONE HYDROCHLORIDE 7.5 MG: 5 TABLET ORAL at 15:44

## 2022-02-01 RX ADMIN — OXYCODONE HYDROCHLORIDE 7.5 MG: 5 TABLET ORAL at 20:22

## 2022-02-01 RX ADMIN — DIPHENHYDRAMINE HYDROCHLORIDE 5 ML: 12.5 SOLUTION ORAL at 13:53

## 2022-02-01 RX ADMIN — HEPARIN SODIUM 5000 UNITS: 5000 INJECTION INTRAVENOUS; SUBCUTANEOUS at 05:51

## 2022-02-01 RX ADMIN — SPIRONOLACTONE 12.5 MG: 25 TABLET ORAL at 09:41

## 2022-02-01 RX ADMIN — HEPARIN SODIUM 5000 UNITS: 5000 INJECTION INTRAVENOUS; SUBCUTANEOUS at 22:20

## 2022-02-01 RX ADMIN — MAGNESIUM OXIDE 400 MG: 400 TABLET ORAL at 20:22

## 2022-02-01 ASSESSMENT — PAIN SCALES - GENERAL
PAINLEVEL_OUTOF10: 0
PAINLEVEL_OUTOF10: 8
PAINLEVEL_OUTOF10: 8
PAINLEVEL_OUTOF10: 9
PAINLEVEL_OUTOF10: 9

## 2022-02-01 ASSESSMENT — PAIN DESCRIPTION - ONSET: ONSET: ON-GOING

## 2022-02-01 ASSESSMENT — PAIN DESCRIPTION - PROGRESSION: CLINICAL_PROGRESSION: NOT CHANGED

## 2022-02-01 ASSESSMENT — PAIN DESCRIPTION - PAIN TYPE
TYPE: ACUTE PAIN
TYPE: CHRONIC PAIN

## 2022-02-01 ASSESSMENT — PAIN DESCRIPTION - LOCATION: LOCATION: FOOT

## 2022-02-01 ASSESSMENT — PAIN DESCRIPTION - FREQUENCY: FREQUENCY: CONTINUOUS

## 2022-02-01 ASSESSMENT — PAIN DESCRIPTION - ORIENTATION: ORIENTATION: LEFT

## 2022-02-01 ASSESSMENT — PAIN DESCRIPTION - DESCRIPTORS
DESCRIPTORS: BURNING;SHARP
DESCRIPTORS: SHARP;SHOOTING

## 2022-02-01 NOTE — PROGRESS NOTES
Occupational Therapy   Occupational Therapy Initial Assessment  Date: 2022   Patient Name: Tanna Tyson  MRN: [id number]     : 1950    Date of Service: 2022    Discharge Recommendations:  Home with nursing aide       Assessment   Performance deficits / Impairments: Decreased ADL status; Decreased functional mobility ; Decreased high-level IADLs  Assessment: May be at baseline  Treatment Diagnosis: R52.1              Patient Diagnosis(es): The primary encounter diagnosis was Leg edema. Diagnoses of Scrotal edema, Other hypervolemia, and Acute on chronic congestive heart failure, unspecified heart failure type [facility]) were also pertinent to this visit. has a past medical history of Acid reflux, Acute MI ([address] Arthritis, Broken teeth, CAD (coronary artery disease), Cardiomyopathy ([address] CHF (congestive heart failure) ([address] Chronic back pain, Chronic kidney disease, Diabetes mellitus ([address] Diabetic neuropathy ([address] H/O cardiovascular stress test, H/O Doppler ultrasound, H/O percutaneous left heart catheterization, History of irregular heartbeat, History of syncope, Hyperlipidemia, Hypertension, Leg swelling, Necrotic toes (HCC), Neuropathy, neuropathy, PAD (peripheral artery disease) ([address] PVD (peripheral vascular disease) ([address] Sick sinus syndrome ([address] Sleep apnea, Spinal stenosis, Teeth missing, Type 2 diabetes mellitus without complication ([address] and WD-Chronic foot ulcer, left, with necrosis of bone ([address] has a past surgical history that includes Coronary angioplasty with stent; Dental surgery; Colonoscopy (2016); pacemaker placement (2010); vascular surgery; colectomy (Right, 2016); Toe amputation (Right, 2017); Toe amputation (Right, 2018); Cardiac catheterization; Cardiac defibrillator placement (2010); Coronary angioplasty; Cardiac catheterization (2017); Cardiac catheterization (2018);  Toe amputation (Left, 2020); and IR TUNNELED CVC PLACE WO SQ PORT/PUMP > 5 YEARS (6/14/2021). Treatment Diagnosis: R52.1      Restrictions  Restrictions/Precautions  Restrictions/Precautions: Fall Risk,General Precautions  Required Braces or Orthoses?: No    Subjective   General  Additional Pertinent Hx: Came to hospital because of lymphedema in scrotal area. Has dressing on feet for wounds. Diagnosis: HF  EF  Pain Assessment  Pain Level: 9  Oxygen Therapy  SpO2: 90 %  Pulse Oximeter Device Mode: Intermittent  Pulse Oximeter Device Location: Finger  O2 Device: None (Room air)  Social/Functional History  Social/Functional History  Lives With: Daughter (and grandchildren)  Type of Home: House  Home Layout: Two level (Uses one story for him)  Home Access: Stairs to enter with rails,Ramped entrance  Entrance Stairs - Number of Steps: 8 in front and ramp to back door  Entrance Stairs - Rails: Right  Bathroom Shower/Tub: Walk-in shower (Can drive hover round into shower)  Bathroom Toilet: Standard  Bathroom Equipment: Tub transfer bench  Bathroom Accessibility: Accessible  Home Equipment: Wheelchair-electric (and hoverround)  Receives Help From: Home health,Family (states bathed and dressed self. Has HH to do laundry etc)  ADL Assistance: Independent  Homemaking Assistance: Needs assistance  Meal Prep: Moderate  Homemaking Responsibilities: No  Ambulation Assistance:  (Unable to stand on feet at this time. Gets around in w/c and hover round at home.)  Active : No  Occupation: Retired  Type of occupation: Trained truck drivers  Leisure & Hobbies: Likes to chart maps for fun  IADL Comments: Cooks some but lets other people mostly do now.        Objective   Vision: Within Functional Limits (wears reading glasses)    Orientation  Overall Orientation Status: Within Normal Limits        ADL  Feeding: Independent  Grooming: Stand by assistance;Setup  UE Bathing: Stand by assistance;Setup  LE Bathing: Setup;Minimal assistance  UE Dressing: Setup;Contact guard assistance  LE Dressing: Moderate assistance;Setup  Toileting: Unable to assess(comment)  Additional Comments: Pt was able to move about in bed and set up on own        Bed mobility  Rolling to Left: Independent  Rolling to Right: Independent  Supine to Sit: Stand by assistance  Sit to Supine: Minimal assistance        Cognition  Overall Cognitive Status: WFL  Perception  Overall Perceptual Status: WFL     Sensation  Overall Sensation Status: WNL        LUE PROM (degrees)  LUE PROM: WNL  LUE AROM (degrees)  LUE AROM : WNL  Left Hand PROM (degrees)  Left Hand PROM: WNL  Left Hand AROM (degrees)  Left Hand AROM: WNL  RUE PROM (degrees)  RUE PROM: WNL  RUE AROM (degrees)  RUE AROM : WNL  Right Hand PROM (degrees)  Right Hand PROM: WNL  Right Hand AROM (degrees)  Right Hand AROM: WNL  LUE Strength  Gross LUE Strength: WNL  RUE Strength  Gross RUE Strength: WNL                   Plan   Plan  Times per week: 2  Plan weeks: 1  Current Treatment Recommendations: Self-Care / ADL,Patient/Caregiver Education & Training,Functional Mobility Training    OutComes Score    AM-PAC Score   15          Goals  Short term goals  Time Frame for Short term goals: 1 week  Short term goal 1: Pt will increase UB self care to mod I to increase functional indpendence  Short term goal 2: Pt will be Min A with transfers to support transfers to [facility]  Long term goals  Time Frame for Long term goals : 2 week  Long term goal 1: Pt will be Mod I with LB self care to increase functional independence to return to PLOF  Patient Goals   Patient goals :  To return home with daughter with [facility] as had before       Therapy Time   Individual Concurrent Group Co-treatment   Time In 0205         Time Out 0235         Minutes [address] [facility] Vidya Cabral

## 2022-02-01 NOTE — PROGRESS NOTES
Nephrology Progress Note  2/1/2022 4:37 PM        Subjective:   Admit Date: 1/22/2022  PCP: Lawrence Records    Interval History: Patient seen early morning, this is a late entry    Diet: Reasonable per patient    ROS: He is doing better  Edema significantly improved  Urine output still 4.2 L/day  No fever  Tongue sore    Data:     Current meds:    magic (miracle) mouthwash  5 mL Swish & Spit 4x Daily    torsemide  40 mg Oral Daily    metOLazone  2.5 mg Oral Daily    insulin lispro  0-6 Units SubCUTAneous TID WC    traZODone  50 mg Oral Nightly    vitamin D  50,000 Units Oral Weekly    tiotropium  2 puff Inhalation Nightly    cefepime  2,000 mg IntraVENous Q24H    spironolactone  12.5 mg Oral Daily    vancomycin (VANCOCIN) intermittent dosing (placeholder)   Other See Admin Instructions    metaxalone  400 mg Oral TID    morphine  2 mg IntraVENous Once    atorvastatin  40 mg Oral Nightly    carvedilol  3.125 mg Oral BID    clopidogrel  75 mg Oral Daily    sodium chloride flush  5-40 mL IntraVENous 2 times per day    heparin (porcine)  5,000 Units SubCUTAneous 3 times per day    lisinopril  5 mg Oral Daily      dextrose      dextrose      sodium chloride 25 mL (01/31/22 1445)    dextrose 100 mL/hr (01/29/22 0623)         I/O last 3 completed shifts:   In: 10 [I.V.:10]  Out: 4900 [Urine:4900]    CBC:   Recent Labs     01/30/22  0918 01/31/22  0530 02/01/22  1055   WBC 10.4 9.4 8.3   HGB 8.7* 8.7* 8.4*    279 255          Recent Labs     01/30/22  0918 01/31/22  0530 02/01/22  1055    139 140   K 4.1 4.2 4.0   CL 92* 93* 94*   CO2 36* 36* 39*   BUN 57* 58* 61*   CREATININE 2.4* 2.5* 2.3*   GLUCOSE 192* 236* 220*       Lab Results   Component Value Date    CALCIUM 8.2 (L) 02/01/2022    PHOS 2.9 02/01/2022       Objective:     Vitals: /66   Pulse 64   Temp 98.2 °F (36.8 °C) (Oral)   Resp 16   Ht 6' (1.829 m)   Wt 249 lb 9 oz (113.2 kg)   SpO2 90%   BMI 33.85 kg/m² General appearance: Alert, awake and oriented  HEENT: At least 1+ conjunctival pallor-likely aphthous ulcer involving the lateral margin of the tongue  Neck: Supple  Lungs: No crackles  Heart: Seems regular rate and rhythm, left chest wall cardiac device which is nontender  Abdomen: Soft, nontender  Extremities: Significant improvement of scrotal, penile and lower extremity edema  Stasis dermatitis       Problem List :         Impression :     1. Acute kidney injury with underlying CKD stage IV A1-stable mainly from fluid overload  2. Severe fluid overload much better now  3. Underlying atherosclerotic cardiovascular disease, hypertension and chronic leg wound  4. Debility, frailty  5. Multifactorial anemia  6. Probable aphthous ulcer-add Magic mouthwash  7. Low magnesium from diuretics replete orally    Recommendation/Plan  :     1. Discontinue Fuller catheter  2. He is on oral diuretics  3. May discharge from my standpoint  4. He needs low-salt and DASH diet at home  5. He needs a CMP, magnesium, phosphorus and CBC differential 1 week after discharge  6.  Follow-up in 2 weeks after discharge with me      Dilcia Lou MD MD

## 2022-02-01 NOTE — PROGRESS NOTES
[facility]  consulted by Dr. Chapin Cleveland for monitoring and adjustment. Indication for treatment: SSTI  Goal trough: 10-15 mcg/mL  AUC/RJ:  <500    Pertinent Laboratory Values:   Temp Readings from Last 3 Encounters:   02/01/22 98.2 °F (36.8 °C) (Oral)   01/16/22 97 °F (36.1 °C) (Oral)   01/06/22 98.3 °F (36.8 °C) (Temporal)     Recent Labs     01/30/22  0918 01/31/22  0530 02/01/22  1055   WBC 10.4 9.4 8.3     Recent Labs     01/30/22  0918 01/31/22  0530 02/01/22  1055   BUN 57* 58* 61*   CREATININE 2.4* 2.5* 2.3*     Estimated Creatinine Clearance: 38 mL/min (A) (based on SCr of 2.3 mg/dL (H)). Intake/Output Summary (Last 24 hours) at 2/1/2022 1551  Last data filed at 2/1/2022 1157  Gross per 24 hour   Intake --   Output 3700 ml   Net -3700 ml     Pertinent Cultures:  Date    Source    Results  1/23   Wound    MRSA    Vancomycin level:   TROUGH:  No results for input(s): VANCOTROUGH in the last 72 hours. RANDOM:    Recent Labs     01/31/22  0530   VANCORANDOM 16.7       Assessment:  · SCr, BUN, and urine output:   · NANCY on CKD, Scr slightly improved down to 2.3 today, UOP good  · Day(s) of therapy: 10  · Vancomycin concentration:   · 1/26: 24.2  · 1/27: 17.3, 29 hour level post 1250mg dose  · 1/29: 17, appropriate to re-dose  · 1/31 - 16.7, 32 hour level post 1000mg dose, ok to re-dose    Plan:  · Intermittent vancomycin dosing with CKD  · The patient received vancomycin 1000mg ivpb x1 dose yesterday  · No vanco dose to be given today  · Recheck the vanco level tomorrow am.  · Pharmacy will continue to monitor patient and adjust therapy as indicated    VANCOMYCIN CONCENTRATION SCHEDULED FOR 2/2 @ 0600    Thank you for the consult,  Alvaro Johnson.  Dar [facility]  2/1/2022 3:51 PM

## 2022-02-02 VITALS
BODY MASS INDEX: 32.7 KG/M2 | DIASTOLIC BLOOD PRESSURE: 73 MMHG | OXYGEN SATURATION: 91 % | HEART RATE: 60 BPM | RESPIRATION RATE: 20 BRPM | SYSTOLIC BLOOD PRESSURE: 158 MMHG | TEMPERATURE: 97.6 F | HEIGHT: 72 IN | WEIGHT: 241.4 LBS

## 2022-02-02 LAB
ALBUMIN SERPL-MCNC: 2.7 GM/DL (ref 3.4–5)
ALP BLD-CCNC: 101 IU/L (ref 40–128)
ALT SERPL-CCNC: 11 U/L (ref 10–40)
ANION GAP SERPL CALCULATED.3IONS-SCNC: 5 MMOL/L (ref 4–16)
AST SERPL-CCNC: 15 IU/L (ref 15–37)
BASOPHILS ABSOLUTE: 0 K/CU MM
BASOPHILS RELATIVE PERCENT: 0.4 % (ref 0–1)
BILIRUB SERPL-MCNC: 0.7 MG/DL (ref 0–1)
BUN BLDV-MCNC: 59 MG/DL (ref 6–23)
CALCIUM SERPL-MCNC: 8.1 MG/DL (ref 8.3–10.6)
CHLORIDE BLD-SCNC: 94 MMOL/L (ref 99–110)
CO2: 40 MMOL/L (ref 21–32)
CREAT SERPL-MCNC: 2.4 MG/DL (ref 0.9–1.3)
DIFFERENTIAL TYPE: ABNORMAL
DOSE AMOUNT: NORMAL
DOSE TIME: NORMAL
EOSINOPHILS ABSOLUTE: 0.2 K/CU MM
EOSINOPHILS RELATIVE PERCENT: 2.2 % (ref 0–3)
GFR AFRICAN AMERICAN: 32 ML/MIN/1.73M2
GFR NON-AFRICAN AMERICAN: 27 ML/MIN/1.73M2
GLUCOSE BLD-MCNC: 203 MG/DL (ref 70–99)
GLUCOSE BLD-MCNC: 212 MG/DL (ref 70–99)
GLUCOSE BLD-MCNC: 225 MG/DL (ref 70–99)
HCT VFR BLD CALC: 29.9 % (ref 42–52)
HEMOGLOBIN: 8.9 GM/DL (ref 13.5–18)
IMMATURE NEUTROPHIL %: 0.3 % (ref 0–0.43)
LYMPHOCYTES ABSOLUTE: 1.2 K/CU MM
LYMPHOCYTES RELATIVE PERCENT: 17.4 % (ref 24–44)
MAGNESIUM: 1.7 MG/DL (ref 1.8–2.4)
MCH RBC QN AUTO: 25.9 PG (ref 27–31)
MCHC RBC AUTO-ENTMCNC: 29.8 % (ref 32–36)
MCV RBC AUTO: 86.9 FL (ref 78–100)
MONOCYTES ABSOLUTE: 0.5 K/CU MM
MONOCYTES RELATIVE PERCENT: 7.2 % (ref 0–4)
NUCLEATED RBC %: 0 %
PDW BLD-RTO: 21 % (ref 11.7–14.9)
PLATELET # BLD: 268 K/CU MM (ref 140–440)
PMV BLD AUTO: 9.4 FL (ref 7.5–11.1)
POTASSIUM SERPL-SCNC: 4.2 MMOL/L (ref 3.5–5.1)
RBC # BLD: 3.44 M/CU MM (ref 4.6–6.2)
SEGMENTED NEUTROPHILS ABSOLUTE COUNT: 5.1 K/CU MM
SEGMENTED NEUTROPHILS RELATIVE PERCENT: 72.5 % (ref 36–66)
SODIUM BLD-SCNC: 139 MMOL/L (ref 135–145)
TOTAL IMMATURE NEUTOROPHIL: 0.02 K/CU MM
TOTAL NUCLEATED RBC: 0 K/CU MM
TOTAL PROTEIN: 6.7 GM/DL (ref 6.4–8.2)
VANCOMYCIN RANDOM: 16.7 UG/ML
WBC # BLD: 7 K/CU MM (ref 4–10.5)

## 2022-02-02 PROCEDURE — 36415 COLL VENOUS BLD VENIPUNCTURE: CPT

## 2022-02-02 PROCEDURE — 80053 COMPREHEN METABOLIC PANEL: CPT

## 2022-02-02 PROCEDURE — 6360000002 HC RX W HCPCS: Performed by: INTERNAL MEDICINE

## 2022-02-02 PROCEDURE — 82962 GLUCOSE BLOOD TEST: CPT

## 2022-02-02 PROCEDURE — 6360000002 HC RX W HCPCS: Performed by: PHYSICIAN ASSISTANT

## 2022-02-02 PROCEDURE — 85025 COMPLETE CBC W/AUTO DIFF WBC: CPT

## 2022-02-02 PROCEDURE — 2580000003 HC RX 258: Performed by: INTERNAL MEDICINE

## 2022-02-02 PROCEDURE — 6370000000 HC RX 637 (ALT 250 FOR IP): Performed by: PHYSICIAN ASSISTANT

## 2022-02-02 PROCEDURE — 83735 ASSAY OF MAGNESIUM: CPT

## 2022-02-02 PROCEDURE — 6370000000 HC RX 637 (ALT 250 FOR IP): Performed by: INTERNAL MEDICINE

## 2022-02-02 PROCEDURE — 2580000003 HC RX 258: Performed by: PHYSICIAN ASSISTANT

## 2022-02-02 PROCEDURE — 80202 ASSAY OF VANCOMYCIN: CPT

## 2022-02-02 PROCEDURE — 97530 THERAPEUTIC ACTIVITIES: CPT

## 2022-02-02 RX ORDER — SPIRONOLACTONE 25 MG/1
12.5 TABLET ORAL DAILY
Qty: 15 TABLET | Refills: 0 | Status: SHIPPED | OUTPATIENT
Start: 2022-02-03 | End: 2022-02-21

## 2022-02-02 RX ORDER — MAGNESIUM OXIDE 400 MG/1
400 TABLET ORAL 2 TIMES DAILY
Qty: 30 TABLET | Refills: 0 | Status: SHIPPED | OUTPATIENT
Start: 2022-02-02 | End: 2022-02-17

## 2022-02-02 RX ORDER — DOXYCYCLINE HYCLATE 100 MG
100 TABLET ORAL 2 TIMES DAILY
Qty: 12 TABLET | Refills: 0 | Status: SHIPPED | OUTPATIENT
Start: 2022-02-02 | End: 2022-02-08

## 2022-02-02 RX ORDER — LISINOPRIL 5 MG/1
5 TABLET ORAL DAILY
Qty: 30 TABLET | Refills: 0 | Status: ON HOLD | OUTPATIENT
Start: 2022-02-03 | End: 2022-10-24 | Stop reason: HOSPADM

## 2022-02-02 RX ORDER — METOLAZONE 2.5 MG/1
2.5 TABLET ORAL DAILY
Qty: 30 TABLET | Refills: 0 | Status: ON HOLD | OUTPATIENT
Start: 2022-02-03 | End: 2022-10-24 | Stop reason: HOSPADM

## 2022-02-02 RX ORDER — TORSEMIDE 20 MG/1
40 TABLET ORAL DAILY
Qty: 60 TABLET | Refills: 0 | Status: SHIPPED | OUTPATIENT
Start: 2022-02-03 | End: 2022-07-21

## 2022-02-02 RX ORDER — MAGNESIUM OXIDE 400 MG/1
400 TABLET ORAL 2 TIMES DAILY
Qty: 30 TABLET | Refills: 0 | Status: SHIPPED | OUTPATIENT
Start: 2022-02-02 | End: 2022-02-02

## 2022-02-02 RX ADMIN — SODIUM CHLORIDE, PRESERVATIVE FREE 10 ML: 5 INJECTION INTRAVENOUS at 09:02

## 2022-02-02 RX ADMIN — CLOPIDOGREL 75 MG: 75 TABLET, FILM COATED ORAL at 09:02

## 2022-02-02 RX ADMIN — OXYCODONE HYDROCHLORIDE 7.5 MG: 5 TABLET ORAL at 03:01

## 2022-02-02 RX ADMIN — METOLAZONE 2.5 MG: 2.5 TABLET ORAL at 09:02

## 2022-02-02 RX ADMIN — DIPHENHYDRAMINE HYDROCHLORIDE 5 ML: 12.5 SOLUTION ORAL at 13:00

## 2022-02-02 RX ADMIN — HEPARIN SODIUM 5000 UNITS: 5000 INJECTION INTRAVENOUS; SUBCUTANEOUS at 05:42

## 2022-02-02 RX ADMIN — MAGNESIUM OXIDE 400 MG: 400 TABLET ORAL at 09:02

## 2022-02-02 RX ADMIN — DIPHENHYDRAMINE HYDROCHLORIDE 5 ML: 12.5 SOLUTION ORAL at 09:01

## 2022-02-02 RX ADMIN — CEFEPIME HYDROCHLORIDE 2000 MG: 2 INJECTION, POWDER, FOR SOLUTION INTRAVENOUS at 11:07

## 2022-02-02 RX ADMIN — CARVEDILOL 3.12 MG: 6.25 TABLET, FILM COATED ORAL at 09:01

## 2022-02-02 RX ADMIN — TORSEMIDE 40 MG: 20 TABLET ORAL at 09:02

## 2022-02-02 RX ADMIN — SPIRONOLACTONE 12.5 MG: 25 TABLET ORAL at 09:02

## 2022-02-02 RX ADMIN — LISINOPRIL 5 MG: 5 TABLET ORAL at 09:01

## 2022-02-02 ASSESSMENT — PAIN SCALES - GENERAL
PAINLEVEL_OUTOF10: 9
PAINLEVEL_OUTOF10: 0

## 2022-02-02 NOTE — DISCHARGE SUMMARY
Discharge Summary    Name:  Марина Ugarte /Age/Sex: 1950  (70 y.o. male)   MRN & CSN:  [id number] & [id number] Admission Date/Time: 2022  3:40 AM   Attending:  Donnice Hamman, MD Discharging Physician: Donnice Hamman, MD     Hospital Course:   Марина Ugarte is a 70 y.o.  male  who presents with Acute on chronic HFrEF (heart failure with reduced ejection fraction) ([facility])    70 y.o.  male with PMH of DM 2, HFrEF/right-sided CHF, HTN, HLD, SSS, s/p PCM and diabetic neuropathy admitted on 2022 with complaints of increasing lower extremity edema associated with orthopnea with findings of acute on chronic HFrEF along with scrotal lymphedema.     Acute on chronic biventricular decompensated heart failure  Elevated troponin  -Edema significantly improved since admission  -Echo  with preserved EF; severely dilated right ventricle with severe tricuspid regurgitation RVSP of 75   started on  80 IV Lasix 4 times a day as per nephrology  Switched to oral diuretic: Spironolactone 12.5 daily, torsemide 40 mg daily, and metolazone 2.5 mg daily,   FC was removed , has successful  voiding trail   He needs low-salt and DASH diet at home  He needs a CMP, magnesium, phosphorus and CBC differential 1 week after discharge  Follow-up in 2 weeks with nephrology   Cardiologist consulted   Nephrologist on board  Monitor renal function and serum electrolytes  Today serum creatinine 2.4  Nephrology okay with discharge         Hypomagnesemia on replacement orally , Mg oxide 400 mg bid      Scrotal lymphedema  Penile and scrotal purulence  -Edema improving daily  -No further purulence  -Scrotal cultures growing MRSA  -Renal ultrasound with extensive soft tissue swelling; no visualized abscess noted  -Urology following we will continue elevation and ice  -started on  IV antibiotics cefepime and vancomycin, finished 8 days course ,  switch to oral doxycycline for another 6 days on discharge  Urology sign off , Recommend elevation and ice. Treat underlying lymphedema with diuretics              Recommend parkinson removal/voiding trial prior to discharge  CT scan shows no gas/infection but did not go all the way down thru scrotum.              Scrotal US 1/24/22: Extensive scrotal soft tissue swelling either due to edema or cellulitis. No visualized findings of abscess or necrotizing fasciitis (Aubrie gangrene).     follow up with urology in 2 weeks     HTN: d/c Cardura due to soft BP, continue on amlodipine , ACE and coreg   Monitor BP at home and follow up with PCP outpatient to adjust BP medication accordingly       History of CKD stage IV  -Nephrology following for diuretic regimen  -FC was removed , has successful  voiding trail   He needs low-salt and DASH diet at home  He needs a CMP, magnesium, phosphorus and CBC differential 1 week after discharge  Follow-up in 2 weeks with nephrology   Cardiologist consulted   Nephrologist on board  Monitor renal function and serum electrolytes   serum creatinine 2.4  Nephrology okay with discharge  To continue on 5 mg lisinopril daily on d/c , nephrology okay with that.     Hypoglycemia: DC high scale sliding scale correction and start on low scale sliding scale correction. On hypoglycemia protocol check blood sugar 3 times per day and before bedtime  Blood sugar is more stable after adjustment of sliding scale correction  Resume on sliding scale correction on discharge     Chronic medical conditions: Home medications resumed was contraindicated  Hypertension  Diabetes mellitus type 2  Sick sinus syndrome status post pacer  PAD  Diabetic foot wounds  Status post right-sided crepitation  Chronic normocytic anemia    PT saw the patient and conclude patient is at his baseline  Will be d/c home with home health  I spoke with daughter who agree with plan and able take care of him.       The patient expressed appropriate understanding of and agreement with the discharge recommendations, medications, and plan. Consults this admission:  IP CONSULT TO HOSPITALIST  IP CONSULT TO HEART FAILURE NURSE/COORDINATOR  IP CONSULT TO DIETITIAN  IP CONSULT TO UROLOGY  IP CONSULT TO CARDIOLOGY  IP CONSULT TO NEPHROLOGY  PHARMACY TO [address]    Discharge Instruction:   Follow up appointments: follow up with nephrology and urology  2-3 weeks  Primary care physician:  within 2 weeks    Diet:  low fat, low cholesterol diet   Activity: activity as tolerated  Disposition: Discharged to:   []Home, [x]HHC, []SNF, []Acute Rehab, []Hospice   Condition on discharge: Stable    Discharge Medications:        Medication List      ASK your doctor about these medications    albuterol sulfate  (90 Base) MCG/ACT inhaler  Commonly known as: Ventolin HFA  Inhale 2 puffs into the lungs every 4 hours as needed for Wheezing     Algicell Calcium Dressing 4\"x8 Misc  Apply 1 Device topically daily     amLODIPine 10 MG tablet  Commonly known as: NORVASC  Take 1 tablet by mouth daily     atorvastatin 40 MG tablet  Commonly known as: LIPITOR  Take 1 tablet by mouth nightly     carvedilol 3.125 MG tablet  Commonly known as: COREG  Take 1 tablet by mouth 2 times daily     chlorthalidone 50 MG tablet  Commonly known as: HYGROTEN  Take 1 tablet by mouth daily     clopidogrel 75 MG tablet  Commonly known as: PLAVIX  Take 1 tablet by mouth daily     diclofenac sodium 1 % Gel  Commonly known as: VOLTAREN  Apply 4 g topically 2 times daily     doxazosin 4 MG tablet  Commonly known as: Cardura  Take 1 tablet by mouth 2 times daily     gabapentin 300 MG capsule  Commonly known as: NEURONTIN  TAKE 1 CAPSULE BY MOUTH 3 TIMES DAILY FOR 90 DAYS.      HumaLOG KwikPen 100 UNIT/ML Sopn  Generic drug: insulin lispro (1 Unit Dial)     metaxalone 800 MG tablet  Commonly known as: SKELAXIN     PROMOGRAN COREY WOUND DRESSING MATRIX  Apply topically Apply to left daily and cover with gauze     Spiriva HandiHaler 18 MCG inhalation capsule  Generic drug: tiotropium            Objective Findings at Discharge:   BP (!) 158/73   Pulse 60   Temp 97.6 °F (36.4 °C) (Axillary)   Resp 20   Ht 6' (1.829 m)   Wt 241 lb 6.5 oz (109.5 kg)   SpO2 91%   BMI 32.74 kg/m²            PHYSICAL EXAM   GEN    Awake.  Alert , not in respiratory distress, not in pain  HEENT: PEERLA, , supple neck,   Chest: air entry equal bilaterally, no wheezing or crepitation  Heart: S1 and S2 heard, no murmur, no gallop or rub, regular rate  Abdomen: soft, ND , Nt, +BS  Extremities: no cyanosis, tenderness or erythema, peripheral pulses audible, scrotal lymphedema is better  with  swelling of both leg is better   Neurology: alert, oriented x3, able to move 4 limbsBMP/CBC  Recent Labs     01/31/22  0530 02/01/22  1055 02/02/22  0132    140 139   K 4.2 4.0 4.2   CL 93* 94* 94*   CO2 36* 39* 40*   BUN 58* 61* 59*   CREATININE 2.5* 2.3* 2.4*   WBC 9.4 8.3 7.0   HCT 29.2* 28.4* 29.9*    255 268       IMAGING:      Discharge Time of 35 minutes    Electronically signed by Jamison Dennis MD on 2/2/2022 at 12:37 PM

## 2022-02-02 NOTE — PROGRESS NOTES
Hospitalist Progress Note      Name:  Bobby Craven /Age/Sex: 1950  (70 y.o. male)   MRN & CSN:  [id number] & [id number] Admission Date/Time: 2022  3:40 AM   [facility] PCP: Avel Raza Day: 12    Assessment and Plan:       70 y.o.  male with PMH of DM 2, HFrEF/right-sided CHF, HTN, HLD, SSS, s/p PCM and diabetic neuropathy admitted on 2022 with complaints of increasing lower extremity edema associated with orthopnea with findings of acute on chronic HFrEF along with scrotal lymphedema.     Acute on chronic biventricular decompensated heart failure  Elevated troponin  -Edema significantly improved since admission  -Echo  with preserved EF; severely dilated right ventricle with severe tricuspid regurgitation RVSP of 75   started on  80 IV Lasix 4 times a day as per nephrology  Switched to oral diuretic: Spironolactone 12.5 daily, torsemide 40 mg daily, and metolazone 2.5 mg daily  FC was removed , on voiding trail   He needs low-salt and DASH diet at home  He needs a CMP, magnesium, phosphorus and CBC differential 1 week after discharge  Follow-up in 2 weeks with nephrology   Cardiologist on board  Nephrologist on board  Monitor renal function and serum electrolytes  Today serum creatinine 2.4      Hypomagnesemia on replacement orally    Scrotal lymphedema  Penile and scrotal purulence  -Edema improving daily  -No further purulence  -Scrotal cultures growing MRSA  -Renal ultrasound with extensive soft tissue swelling; no visualized abscess noted  -Urology following we will continue elevation and ice  -Continue IV antibiotics cefepime and vancomycin  May switch to oral doxycycline for another 6 days on discharge     History of CKD stage IV  -Nephrology following for diuretic regimen  -Creatinine slight increase from  however currently stable in setting of diuretic therapy for above  -Appreciate nephrology recommendations    Hypoglycemia: DC high scale sliding scale correction and start on low scale sliding scale correction. On hypoglycemia protocol check blood sugar 3 times per day and before bedtime  Blood sugar is more stable after adjustment of sliding scale correction  Resume on sliding scale correction on discharge     Chronic medical conditions: Home medications resumed was contraindicated  Hypertension  Diabetes mellitus type 2  Sick sinus syndrome status post pacer  PAD  Diabetic foot wounds  Status post right-sided crepitation  Chronic normocytic anemia     Reason for continued hospitalization:  iv AB     Diet ADULT DIET; Regular; 5 carb choices (75 gm/meal); Low Sodium (2 gm)   DVT Prophylaxis [] Lovenox, [x]  Heparin, [] SCDs, [] Ambulation   GI Prophylaxis [] PPI,  [] H2 Blocker,  [] Carafate,  [] Diet/Tube Feeds   Code Status Full Code   Disposition Patient requires continued admission due to    MDM [] Low, [] Moderate,[]  High  Patient's risk as above due to      History of Present Illness: The patient was seen and examined at the bedside. Patient denies shortness of breath or chest pain  Feels better  No further restlessness, blood sugar is more stable after adjustment of sliding scale correction  PT OT evaluation ordered,may need acute rehab placement . FC was removed , on voiding train  Switched to oral lasix   He needs low-salt and DASH diet at home  He needs a CMP, magnesium, phosphorus and CBC differential 1 week after discharge  Follow-up in 2 weeks with nephrology       Pending PT OT evaluation   The patient is bedbound  I spoke with daughter [place] who does not want the patient to be in nursing home but she is okay with rehab placement. Objective:        Intake/Output Summary (Last 24 hours) at 2/2/2022 0937  Last data filed at 2/2/2022 0103  Gross per 24 hour   Intake --   Output 2075 ml   Net -2075 ml      Vitals:   Vitals:    02/02/22 0845   BP: (!) 158/73   Pulse: 60   Resp: 20   Temp: 97.6 °F (36.4 °C) SpO2: 91%     Physical Exam:   GEN Awake.  Alert , not in respiratory distress, not in pain  HEENT: PEERLA, , supple neck,   Chest: air entry equal bilaterally, no wheezing or crepitation  Heart: S1 and S2 heard, no murmur, no gallop or rub, regular rate  Abdomen: soft, ND , Nt, +BS  Extremities: no cyanosis, tenderness or erythema, peripheral pulses audible, scrotal lymphedema is better  with  swelling of both leg is better   Neurology: alert, oriented x3, able to move 4 limbs    Medications:   Medications:    magic (miracle) mouthwash  5 mL Swish & Spit 4x Daily    magnesium oxide  400 mg Oral BID    torsemide  40 mg Oral Daily    metOLazone  2.5 mg Oral Daily    insulin lispro  0-6 Units SubCUTAneous TID WC    traZODone  50 mg Oral Nightly    vitamin D  50,000 Units Oral Weekly    tiotropium  2 puff Inhalation Nightly    cefepime  2,000 mg IntraVENous Q24H    spironolactone  12.5 mg Oral Daily    vancomycin (VANCOCIN) intermittent dosing (placeholder)   Other See Admin Instructions    metaxalone  400 mg Oral TID    morphine  2 mg IntraVENous Once    atorvastatin  40 mg Oral Nightly    carvedilol  3.125 mg Oral BID    clopidogrel  75 mg Oral Daily    sodium chloride flush  5-40 mL IntraVENous 2 times per day    heparin (porcine)  5,000 Units SubCUTAneous 3 times per day    lisinopril  5 mg Oral Daily      Infusions:    dextrose      dextrose      sodium chloride 25 mL (01/31/22 1445)    dextrose 100 mL/hr (01/29/22 0623)     PRN Meds: glucose, 15 g, PRN  dextrose, 12.5 g, PRN  glucagon (rDNA), 1 mg, PRN  dextrose, 100 mL/hr, PRN  oxyCODONE, 7.5 mg, Q4H PRN  albuterol sulfate HFA, 2 puff, Q4H PRN  sodium chloride flush, 5-40 mL, PRN  sodium chloride, 25 mL, PRN  ondansetron, 4 mg, Q8H PRN   Or  ondansetron, 4 mg, Q6H PRN  polyethylene glycol, 17 g, Daily PRN  acetaminophen, 650 mg, Q6H PRN   Or  acetaminophen, 650 mg, Q6H PRN  dextrose, 100 mL/hr, PRN  acetaminophen, 650 mg, Q6H PRN  ondansetron, 4 mg, Q6H PRN  benzonatate, 100 mg, TID PRN  melatonin, 3 mg, Nightly PRN  ALPRAZolam, 0.25 mg, Nightly PRN  aluminum & magnesium hydroxide-simethicone, 30 mL, Q6H PRN  morphine, 2 mg, Q4H PRN  dextrose bolus (hypoglycemia), 125 mL, PRN   Or  dextrose bolus (hypoglycemia), 250 mL, PRN          Electronically signed by Enedelia Jackson MD on 2/2/2022 at 9:37 AM

## 2022-02-02 NOTE — PROGRESS NOTES
Physical Therapy    Physical Therapy Treatment Note  Name: Bobby Craven MRN: [id number] :   1950   Date:  2022   Admission Date: 2022 Room:  [address] [place] [address]   Restrictions/Precautions:  Restrictions/Precautions  Restrictions/Precautions: Fall Risk,General Precautions  Required Braces or Orthoses?: No       Communication with other providers:  RN approves tx session. Subjective:  Patient states:  Agreeable to tx session; \"Oh your a strong country girl\"  Pain:   Location, Type, Intensity (0/10 to 10/10):  No C/O    Objective:    Observation:  Pt in bed upon arrival.    Treatment, including education/measures:  Transfers:  Rolling: SBA  Supine to sit :SBA  Scooting :SBA  Sit to stand :Teo   Stand to sit :Toe for eccentric control   SPT:CG/Teo with max VC's for sequencing. During transfer instead of reaching for the arm chair as instructed pt grabs onto this therapists shoulders to support self. Safety  Patient left safely in the chair, with call light/phone in reach with alarm applied. Gait belt used for transfers.     Assessment / Impression:       Patient's tolerance of treatment:  good   Adverse Reaction: none  Significant change in status and impact:  none  Barriers to improvement: strength, endurance     Plan for Next Session:    Will cont to work towards pt's goals per hsi tolerance    Time in:  1055  Time out:  1122  Timed treatment minutes:  27  Total treatment time:  27    Previously filed items:  Social/Functional History  Lives With: Daughter (and grandchildren)  Type of Home: House  Home Layout: Two level (Uses one story for him)  Home Access: Stairs to enter with rails,Ramped entrance   [facility] [address] - Number of Steps: 8 in front and ramp to back door  Entrance Stairs - Rails: Right  Bathroom Shower/Tub: Walk-in shower (Can drive hover round into shower)  Bathroom Toilet: Standard  Bathroom Equipment: Tub transfer bench  Bathroom Accessibility: Accessible  Home Equipment: Wheelchair-electric (and hoverround)  Receives Help From: Home health,Family (states bathed and dressed self. Has HH to do laundry etc)  ADL Assistance: Independent  Homemaking Assistance: Needs assistance  Meal Prep: Moderate  Homemaking Responsibilities: No  Ambulation Assistance:  (Unable to stand on feet at this time. Gets around in w/c and hover round at home.)  Active : No  Occupation: Retired  Type of occupation: Trained truck drivers  Leisure & Hobbies: Likes to chart maps for fun  IADL Comments: Cooks some but lets other people mostly do now.   Short term goals  Time Frame for Short term goals: 1 week  Short term goal 1: Pt to complete all bed mobility SBA  Short term goal 2: Pt to complete stand pivot with LRAD SBA  Short term goal 3: Pt to complete STS SBA       Electronically signed by:    Iqra Lang, TREY  2/2/2022, 12:57 PM

## 2022-02-02 NOTE — PROGRESS NOTES
6.5 oz (109.5 kg)   SpO2 91%   BMI 32.74 kg/m²     General appearance: Alert, awake and oriented  HEENT: Positive conjunctival pallor  Neck: Supple  Lungs: Few rhonchi no crackles  Heart: Regular rate and rhythm, left chest wall cardiac device  Abdomen: Soft  Extremities: Minimal edema now  He does have slight spasticity and trauma around the meatus  Stasis dermatitis      Problem List :         Impression :     1. Acute kidney injury with underlying CKD stage IV A1-stable-creatinine will fluctuate from time to time  2. Severe fluid overload much better now  3. Local trauma of meatus of penis  4. Underlying atherosclerotic cardiovascular disease, chronic leg wound multifactoral anemia  5. Multifactorial anemia    Recommendation/Plan  :     1. May discharge from my standpoint   2. local antibiotic ointment perhaps in meatus  3. May discharge with existing diuretics  4. Also continue Magic mouthwash at home  5. Need a CMP, magnesium or phosphorus as well as CBC differential in 1 week  6.  Follow-up with me in 2 weeks      Marah Gibbs MD MD

## 2022-02-02 NOTE — PROGRESS NOTES
Hospitalist Progress Note      Name:  Santos Kelly /Age/Sex: 1950  (70 y.o. male)   MRN & CSN:  [id number] & [id number] Admission Date/Time: 2022  3:40 AM   Location:  [address] [place] [address]- PCP: Dillon Montes Day: 12    Assessment and Plan:       70 y.o.  male with PMH of DM 2, HFrEF/right-sided CHF, HTN, HLD, SSS, s/p PCM and diabetic neuropathy admitted on 2022 with complaints of increasing lower extremity edema associated with orthopnea with findings of acute on chronic HFrEF along with scrotal lymphedema.     Acute on chronic biventricular decompensated heart failure  Elevated troponin  -Edema significantly improved since admission  -Echo  with preserved EF; severely dilated right ventricle with severe tricuspid regurgitation RVSP of 75   continue 80 IV Lasix 4 times a day as per nephrology  -Metolazone added today  -Continue spironolactone  Cardiologist on board  Nephrologist on board  Monitor renal function and serum electrolytes  Today serum creatinine 2.5      Scrotal lymphedema  Penile and scrotal purulence  -Edema improving daily  -No further purulence  -Scrotal cultures growing MRSA  -Renal ultrasound with extensive soft tissue swelling; no visualized abscess noted  -Urology following we will continue elevation and ice  -Continue IV antibiotics cefepime and vancomycin     History of CKD stage IV  -Nephrology following for diuretic regimen  -Creatinine slight increase from  however currently stable in setting of diuretic therapy for above  -Appreciate nephrology recommendations  Serum creatinine 2.4     Hypoglycemia: DC high scale sliding scale correction and start on low scale sliding scale correction.   On hypoglycemia protocol check blood sugar 3 times per day and before bedtime  Blood sugar is more stable after adjustment of sliding scale correction       Chronic medical conditions: Home medications resumed was contraindicated  Hypertension  Diabetes mellitus type 2  Sick sinus syndrome status post pacer  PAD  Diabetic foot wounds  Status post right-sided crepitation  Chronic normocytic anemia     Reason for continued hospitalization: Continue IV diuresis and iv AB     Diet ADULT DIET; Regular; 5 carb choices (75 gm/meal); Low Sodium (2 gm)   DVT Prophylaxis [] Lovenox, [x]  Heparin, [] SCDs, [] Ambulation   GI Prophylaxis [] PPI,  [] H2 Blocker,  [] Carafate,  [] Diet/Tube Feeds   Code Status Full Code   Disposition Patient requires continued admission due to    MDM [] Low, [] Moderate,[]  High  Patient's risk as above due to      History of Present Illness: The patient was seen and examined at the bedside. Patient denies shortness of breath or chest pain  Feels better  No further restlessness, blood sugar is more stable after adjustment of sliding scale correction  PT OT evaluation ordered,may need acute rehab placement . Objective: Intake/Output Summary (Last 24 hours) at 2/2/2022 0936  Last data filed at 2/2/2022 0103  Gross per 24 hour   Intake --   Output 2075 ml   Net -2075 ml      Vitals:   Vitals:    02/02/22 0845   BP: (!) 158/73   Pulse: 60   Resp: 20   Temp: 97.6 °F (36.4 °C)   SpO2: 91%     Physical Exam:   GEN Awake.  Alert , not in respiratory distress, not in pain  HEENT: PEERLA, , supple neck,   Chest: air entry equal bilaterally, no wheezing or crepitation  Heart: S1 and S2 heard, no murmur, no gallop or rub, regular rate  Abdomen: soft, ND , Nt, +BS  Extremities: no cyanosis, tenderness or erythema, peripheral pulses audible, scrotal lymphedema with nonpitting swelling of both leg  Neurology: alert, oriented x3, able to move 4 limbs    Medications:   Medications:    magic (miracle) mouthwash  5 mL Swish & Spit 4x Daily    magnesium oxide  400 mg Oral BID    torsemide  40 mg Oral Daily    metOLazone  2.5 mg Oral Daily    insulin lispro  0-6 Units SubCUTAneous TID WC    traZODone  50 mg Oral Nightly    vitamin D  50,000 Units Oral Weekly    tiotropium  2 puff Inhalation Nightly    cefepime  2,000 mg IntraVENous Q24H    spironolactone  12.5 mg Oral Daily    vancomycin (VANCOCIN) intermittent dosing (placeholder)   Other See Admin Instructions    metaxalone  400 mg Oral TID    morphine  2 mg IntraVENous Once    atorvastatin  40 mg Oral Nightly    carvedilol  3.125 mg Oral BID    clopidogrel  75 mg Oral Daily    sodium chloride flush  5-40 mL IntraVENous 2 times per day    heparin (porcine)  5,000 Units SubCUTAneous 3 times per day    lisinopril  5 mg Oral Daily      Infusions:    dextrose      dextrose      sodium chloride 25 mL (01/31/22 1445)    dextrose 100 mL/hr (01/29/22 0623)     PRN Meds: glucose, 15 g, PRN  dextrose, 12.5 g, PRN  glucagon (rDNA), 1 mg, PRN  dextrose, 100 mL/hr, PRN  oxyCODONE, 7.5 mg, Q4H PRN  albuterol sulfate HFA, 2 puff, Q4H PRN  sodium chloride flush, 5-40 mL, PRN  sodium chloride, 25 mL, PRN  ondansetron, 4 mg, Q8H PRN   Or  ondansetron, 4 mg, Q6H PRN  polyethylene glycol, 17 g, Daily PRN  acetaminophen, 650 mg, Q6H PRN   Or  acetaminophen, 650 mg, Q6H PRN  dextrose, 100 mL/hr, PRN  acetaminophen, 650 mg, Q6H PRN  ondansetron, 4 mg, Q6H PRN  benzonatate, 100 mg, TID PRN  melatonin, 3 mg, Nightly PRN  ALPRAZolam, 0.25 mg, Nightly PRN  aluminum & magnesium hydroxide-simethicone, 30 mL, Q6H PRN  morphine, 2 mg, Q4H PRN  dextrose bolus (hypoglycemia), 125 mL, PRN   Or  dextrose bolus (hypoglycemia), 250 mL, PRN          Electronically signed by Bryce Euceda MD on 2/2/2022 at 9:36 AM

## 2022-02-21 ENCOUNTER — HOSPITAL ENCOUNTER (OUTPATIENT)
Age: 72
Discharge: HOME | End: 2022-02-21
Payer: MEDICARE

## 2022-02-21 DIAGNOSIS — R60.9 EDEMA, UNSPECIFIED TYPE: ICD-10-CM

## 2022-02-21 DIAGNOSIS — N18.32 CKD STAGE G3B/A3, GFR 30-44 AND ALBUMIN CREATININE RATIO >300 MG/G (HCC): ICD-10-CM

## 2022-02-21 DIAGNOSIS — I10 HYPERTENSION, UNSPECIFIED TYPE: ICD-10-CM

## 2022-02-21 DIAGNOSIS — Z79.4 TYPE 2 DIABETES MELLITUS WITH STAGE 3 CHRONIC KIDNEY DISEASE, WITH LONG-TERM CURRENT USE OF INSULIN, UNSPECIFIED WHETHER STAGE 3A OR 3B CKD (HCC): ICD-10-CM

## 2022-02-21 DIAGNOSIS — E11.22 TYPE 2 DIABETES MELLITUS WITH STAGE 3 CHRONIC KIDNEY DISEASE, WITH LONG-TERM CURRENT USE OF INSULIN, UNSPECIFIED WHETHER STAGE 3A OR 3B CKD (HCC): ICD-10-CM

## 2022-02-21 DIAGNOSIS — N18.30 TYPE 2 DIABETES MELLITUS WITH STAGE 3 CHRONIC KIDNEY DISEASE, WITH LONG-TERM CURRENT USE OF INSULIN, UNSPECIFIED WHETHER STAGE 3A OR 3B CKD (HCC): ICD-10-CM

## 2022-02-21 LAB
BASOPHILS ABSOLUTE: 0 K/CU MM
BASOPHILS RELATIVE PERCENT: 0.5 % (ref 0–1)
DIFFERENTIAL TYPE: ABNORMAL
EOSINOPHILS ABSOLUTE: 0.1 K/CU MM
EOSINOPHILS RELATIVE PERCENT: 2.1 % (ref 0–3)
HCT VFR BLD CALC: 30.7 % (ref 42–52)
HEMOGLOBIN: 9.2 GM/DL (ref 13.5–18)
IMMATURE NEUTROPHIL %: 0.3 % (ref 0–0.43)
LYMPHOCYTES ABSOLUTE: 1 K/CU MM
LYMPHOCYTES RELATIVE PERCENT: 15.7 % (ref 24–44)
MCH RBC QN AUTO: 26.1 PG (ref 27–31)
MCHC RBC AUTO-ENTMCNC: 30 % (ref 32–36)
MCV RBC AUTO: 87.2 FL (ref 78–100)
MONOCYTES ABSOLUTE: 0.4 K/CU MM
MONOCYTES RELATIVE PERCENT: 6.2 % (ref 0–4)
NUCLEATED RBC %: 0 %
PDW BLD-RTO: 19.4 % (ref 11.7–14.9)
PLATELET # BLD: 265 K/CU MM (ref 140–440)
PMV BLD AUTO: 10.3 FL (ref 7.5–11.1)
RBC # BLD: 3.52 M/CU MM (ref 4.6–6.2)
REASON FOR REJECTION: NORMAL
REJECTED TEST: NORMAL
SEGMENTED NEUTROPHILS ABSOLUTE COUNT: 4.6 K/CU MM
SEGMENTED NEUTROPHILS RELATIVE PERCENT: 75.2 % (ref 36–66)
TOTAL IMMATURE NEUTOROPHIL: 0.02 K/CU MM
TOTAL NUCLEATED RBC: 0 K/CU MM
WBC # BLD: 6.2 K/CU MM (ref 4–10.5)

## 2022-02-21 PROCEDURE — 85025 COMPLETE CBC W/AUTO DIFF WBC: CPT

## 2022-02-21 PROCEDURE — 80053 COMPREHEN METABOLIC PANEL: CPT

## 2022-02-21 PROCEDURE — 36415 COLL VENOUS BLD VENIPUNCTURE: CPT

## 2022-02-25 ENCOUNTER — HOSPITAL ENCOUNTER (OUTPATIENT)
Dept: WOUND CARE | Age: 72
Discharge: HOME | End: 2022-02-25
Payer: MEDICARE

## 2022-02-25 VITALS — TEMPERATURE: 97.1 F | RESPIRATION RATE: 18 BRPM

## 2022-02-25 DIAGNOSIS — E11.621 DIABETIC ULCER OF TOE OF RIGHT FOOT ASSOCIATED WITH TYPE 2 DIABETES MELLITUS, WITH FAT LAYER EXPOSED (HCC): ICD-10-CM

## 2022-02-25 DIAGNOSIS — I73.9 PVD (PERIPHERAL VASCULAR DISEASE) (HCC): Primary | ICD-10-CM

## 2022-02-25 DIAGNOSIS — L97.512 DIABETIC ULCER OF TOE OF RIGHT FOOT ASSOCIATED WITH TYPE 2 DIABETES MELLITUS, WITH FAT LAYER EXPOSED (HCC): ICD-10-CM

## 2022-02-25 DIAGNOSIS — L97.522 DIABETIC ULCER OF TOE OF LEFT FOOT ASSOCIATED WITH TYPE 2 DIABETES MELLITUS, WITH FAT LAYER EXPOSED (HCC): ICD-10-CM

## 2022-02-25 DIAGNOSIS — E11.621 DIABETIC ULCER OF TOE OF LEFT FOOT ASSOCIATED WITH TYPE 2 DIABETES MELLITUS, WITH FAT LAYER EXPOSED (HCC): ICD-10-CM

## 2022-02-25 PROCEDURE — 99213 OFFICE O/P EST LOW 20 MIN: CPT

## 2022-02-25 ASSESSMENT — PAIN DESCRIPTION - ONSET: ONSET: ON-GOING

## 2022-02-25 ASSESSMENT — PAIN DESCRIPTION - PAIN TYPE: TYPE: ACUTE PAIN

## 2022-02-25 ASSESSMENT — PAIN DESCRIPTION - FREQUENCY: FREQUENCY: CONTINUOUS

## 2022-02-25 ASSESSMENT — PAIN SCALES - GENERAL: PAINLEVEL_OUTOF10: 8

## 2022-02-25 ASSESSMENT — PAIN - FUNCTIONAL ASSESSMENT: PAIN_FUNCTIONAL_ASSESSMENT: PREVENTS OR INTERFERES SOME ACTIVE ACTIVITIES AND ADLS

## 2022-02-25 ASSESSMENT — PAIN DESCRIPTION - LOCATION: LOCATION: LEG;COCCYX

## 2022-02-25 ASSESSMENT — PAIN DESCRIPTION - PROGRESSION: CLINICAL_PROGRESSION: NOT CHANGED

## 2022-02-25 ASSESSMENT — PAIN DESCRIPTION - DESCRIPTORS: DESCRIPTORS: SORE

## 2022-02-25 ASSESSMENT — PAIN DESCRIPTION - ORIENTATION: ORIENTATION: LEFT

## 2022-02-25 NOTE — PROGRESS NOTES
Wound Care Center Progress Note       Cary Almanza  AGE: 70 y.o. GENDER: male  : 1950  TODAY'S DATE:  2022        Subjective:     Chief Complaint   Patient presents with    Wound Check         HISTORY of PRESENT ILLNESS     Cary Almanza is a 70 y.o. male who presents today for wound evaluation of Chronic arterial and diabetic ulcer(s) of both feet and toes. The ulcer is of marked severity. The underlying cause of the wound is arterial disease. At this point, his wounds are stable or slowly improving. He has healed out some and has recently lost a toe-nail with some exposed nail bed. Wound Pain Timing/Severity: none  Quality of pain: N/A  Severity of pain:  0 / 10   Modifying Factors: diabetes, obesity and arterial insufficiency  Associated Signs/Symptoms: drainage        PAST MEDICAL HISTORY        Diagnosis Date    Acid reflux     Acute MI ([facility] [address] ,     Arthritis     Back    Broken teeth     Upper Front    CAD (coronary artery disease)     Sees Dr. Jerry Davila [facility])     per old chart    CHF (congestive heart failure) ([facility] [address] [facility]     Chronic back pain     Chronic kidney disease     STAGE 3 KIDNEY FAILURE- \"from my diabetes- do not follow with any one- have seen Dr Ferdinand Dick in the past\"    Diabetes mellitus ([facility] [address] Dx 1965    per old chart pt has been diabetic since age 13    Diabetic neuropathy ([facility] [address]     \"in my feet\"    H/O cardiovascular stress test 2016    H/O Doppler ultrasound 2018    Moderate disease of the right lower extremity with an JALEN of 0.72.   Moderate to severe disease of the left lower extremity with an JALEN of 0.55.   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)     Sick sinus syndrome ([facility])     Sleep apnea     \"sleep study 3 yrs ago- could not tolerate the cpap made me too dry\"    Spinal stenosis     Teeth missing     Upper And Lower    Type 2 diabetes mellitus without complication ([facility] [address] [facility]     WD-Chronic foot ulcer, left, with necrosis of bone ([facility] [address] 11/12/2021       PAST SURGICAL HISTORY    Past Surgical History:   Procedure Laterality Date    CARDIAC CATHETERIZATION      per old chart done 10/2014    CARDIAC CATHETERIZATION  07/14/2017    with angiography of leg    CARDIAC CATHETERIZATION  11/20/2018    PATENT STENTS OF ALL THREE MAJOR VESSELS    CARDIAC DEFIBRILLATOR PLACEMENT  06/04/2010    [employer] Seceusebio DIAZ Defibrillator Implanted    COLECTOMY Right 08/26/2016    laparascopic; robotic assisted    COLONOSCOPY  08/04/2016    CORONARY ANGIOPLASTY      \"15 Heart Stents\"    CORONARY ANGIOPLASTY WITH STENT PLACEMENT      per old chart had angio with stent to circumflex and obtuse marginal artery at [facility] 5/2010( old chart also gives hx of stent placement done 2000,2004 and 2005)    DENTAL SURGERY      Teeth Extracted In Past    IR TUNNELED CATHETER PLACEMENT GREATER THAN 5 YEARS  6/14/2021    IR TUNNELED CATHETER PLACEMENT GREATER THAN 5 YEARS 6/14/2021 [facility]    PACEMAKER PLACEMENT  06/04/2010    Medtronic [address]ura DR Defibrillator Implanted    TOE AMPUTATION Right 09/12/2017    Rt 3rd toe    TOE AMPUTATION Right 01/09/2018     Right 5th toe amputation and Toenails trimmed left 2,3,4 and 5th toes    TOE AMPUTATION Left 12/26/2020    LEFT GREAT TOE AMPUTATION performed by Javier Keating MD at [facility]      per old chart had balloon angioplasty right superfical femoral artery,right popliteal artery,,right ant.tibial artery, right tibioperoneal trunk, and right post.tibial artery wna stent placement right popliteal artery and superfical femoral artery 2012       FAMILY HISTORY    Family History   Problem Relation Age of Onset    Diabetes Mother     Stroke Mother     High Blood Pressure Mother    [place] Saliva Vision Loss Mother     Cancer Father         Prostate Cancer    Diabetes Sister     Neuropathy Sister     Other Sister         \"Breathing Problems\"    Heart Disease Sister     Early Death Sister 62        Heart Complications    Cancer Brother         \"Stomach Cancer\"    High Blood Pressure Brother     Diabetes Brother     Heart Disease Brother     High Blood Pressure Brother     Cancer Son         \"Testicle Cancer\"       SOCIAL HISTORY    Social History     Tobacco Use    Smoking status: Former Smoker     Packs/day: 0.25     Years: 36.00     Pack years: 9.00     Types: Cigars     Start date: 1980     Quit date: 10/22/2021     Years since quittin.3    Smokeless tobacco: Never Used    Tobacco comment: Client states he has stopped smoking   Vaping Use    Vaping Use: Never used   Substance Use Topics    Alcohol use: No    Drug use: Yes     Types: Marijuana (Weed)       ALLERGIES    Allergies   Allergen Reactions    Pcn [Penicillins] Hives    Fentanyl Itching       MEDICATIONS    Current Outpatient Medications on File Prior to Encounter   Medication Sig Dispense Refill    lisinopril (PRINIVIL;ZESTRIL) 5 MG tablet Take 1 tablet by mouth daily 30 tablet 0    metOLazone (ZAROXOLYN) 2.5 MG tablet Take 1 tablet by mouth daily 30 tablet 0    torsemide (DEMADEX) 20 MG tablet Take 2 tablets by mouth daily 60 tablet 0    diclofenac sodium (VOLTAREN) 1 % GEL Apply 4 g topically 2 times daily 120 g 5    amLODIPine (NORVASC) 10 MG tablet Take 1 tablet by mouth daily 30 tablet 5    metaxalone (SKELAXIN) 800 MG tablet Take 800 mg by mouth 3 times daily      SPIRIVA HANDIHALER 18 MCG inhalation capsule       insulin lispro, 1 Unit Dial, (HUMALOG KWIKPEN) 100 UNIT/ML SOPN Inject into the skin 2 times daily Sliding scale dose      atorvastatin (LIPITOR) 40 MG tablet Take 1 tablet by mouth nightly 30 tablet 3    carvedilol (COREG) 3.125 MG tablet Take 1 tablet by mouth 2 times daily 60 tablet 3    albuterol sulfate HFA (VENTOLIN HFA) 108 (90 Base) MCG/ACT inhaler Inhale 2 puffs into the lungs every 4 hours as needed for Wheezing 1 Inhaler 3    Calcium Alginate (ALGICELL CALCIUM DRESSING 4\"X8) MISC Apply 1 Device topically daily 30 each 3    PROMOGRAN COREY WOUND DRESSING MATRIX Apply topically Apply to left daily and cover with gauze 1 Package 3    clopidogrel (PLAVIX) 75 MG tablet Take 1 tablet by mouth daily 30 tablet 11    gabapentin (NEURONTIN) 300 MG capsule TAKE 1 CAPSULE BY MOUTH 3 TIMES DAILY FOR 90 DAYS. 90 capsule 3     No current facility-administered medications on file prior to encounter. REVIEW OF SYSTEMS    Pertinent items are noted in HPI. Constitutional: Negative for systemic symptoms including fever, chills and malaise. Objective:      Temp 97.1 °F (36.2 °C) (Temporal)   Resp 18     PHYSICAL EXAM       General: The patient is in no acute distress. Mental status:  Patient is appropriate, is  oriented to place and plan of care. Dermatologic exam: Visual inspection of the periwound reveals the skin to be normal in turgor and texture. Wound exam:  see wound description below     All active wounds listed below with today's date are evaluated      Wound 07/06/17 Other (Comment) Toe (Comment  which one) (Active)   Number of days: 1694       Wound 12/10/20 Foot Left;Lateral fluid filled blister (Active)   Number of days: 441       Wound 10/30/21   #1 Left Dorsal Great Toe (Active)   Wound Image   02/25/22 1529   Wound Etiology Diabetic 02/25/22 1529   Dressing Status Clean;Dry; Intact; New dressing applied 02/25/22 1600   Wound Cleansed Soap and water 02/25/22 1529   Dressing/Treatment Moisturizing cream;Collagen;Alginate with Ag 02/25/22 1600   Offloading for Diabetic Foot Ulcers Post op shoe 02/25/22 1529   Wound Length (cm) 1.3 cm 02/25/22 1529   Wound Width (cm) 2.6 cm 02/25/22 1529   Wound Depth (cm) 0.1 cm 02/25/22 1529   Wound Surface Area (cm^2) 3.38 cm^2 02/25/22 1529   Change in Wound Size % (l*w) -7.3 02/25/22 1529   Wound Volume (cm^3) 0.338 cm^3 02/25/22 1529   Wound Healing % 46 02/25/22 1529   Post-Procedure Length (cm) 1.5 cm 01/06/22 1128   Post-Procedure Width (cm) 2 cm 01/06/22 1128   Post-Procedure Depth (cm) 0.5 cm 01/06/22 1128   Post-Procedure Surface Area (cm^2) 3 cm^2 01/06/22 1128   Post-Procedure Volume (cm^3) 1.5 cm^3 01/06/22 1128   Distance Tunneling (cm) 0 cm 02/25/22 1529   Tunneling Position ___ O'Clock 0 02/25/22 1529   Undermining Starts ___ O'Clock 0 02/25/22 1529   Undermining Ends___ O'Clock 0 02/25/22 1529   Undermining Maxium Distance (cm) 0 02/25/22 1529   Wound Assessment Slough;Granulation tissue 02/25/22 1529   Drainage Amount Moderate 02/25/22 1529   Drainage Description Serosanguinous; Yellow 02/25/22 1529   Odor None 02/25/22 1529   Dina-wound Assessment Dry/flaky 02/25/22 1529   Margins Defined edges 02/25/22 1529   Wound Thickness Description not for Pressure Injury Full thickness 02/25/22 1529   Number of days: 118       Wound 10/30/21   #2 Left Dorsal Second Toe  (Active)   Wound Image   02/25/22 1529   Wound Etiology Diabetic 02/25/22 1529   Dressing Status Clean;Dry; Intact; New dressing applied 02/25/22 1600   Wound Cleansed Soap and water 02/25/22 1529   Dressing/Treatment Moisturizing cream;Collagen;Alginate with Ag 02/25/22 1600   Offloading for Diabetic Foot Ulcers Post op shoe 02/25/22 1529   Wound Length (cm) 2.3 cm 02/25/22 1529   Wound Width (cm) 1.4 cm 02/25/22 1529   Wound Depth (cm) 0.1 cm 02/25/22 1529   Wound Surface Area (cm^2) 3.22 cm^2 02/25/22 1529   Change in Wound Size % (l*w) -15 02/25/22 1529   Wound Volume (cm^3) 0.322 cm^3 02/25/22 1529   Wound Healing % -15 02/25/22 1529   Post-Procedure Length (cm) 2 cm 01/06/22 1128   Post-Procedure Width (cm) 1.5 cm 01/06/22 1128   Post-Procedure Depth (cm) 0.3 cm 01/06/22 1128   Post-Procedure Surface Area (cm^2) 3 cm^2 01/06/22 1128   Post-Procedure Volume (cm^3) 0.9 cm^3 01/06/22 1128   Distance Tunneling (cm) 0 cm 02/25/22 1529   Tunneling Position ___ O'Clock 0 02/25/22 1529   Undermining Starts ___ O'Clock 0 02/25/22 1529   Undermining Ends___ O'Clock 0 02/25/22 1529   Undermining Maxium Distance (cm) 0 02/25/22 1529   Wound Assessment Slough;Granulation tissue 02/25/22 1529   Drainage Amount Moderate 02/25/22 1529   Drainage Description Serosanguinous; Yellow 02/25/22 1529   Odor None 02/25/22 1529   Dina-wound Assessment Dry/flaky 02/25/22 1529   Margins Defined edges 02/25/22 1529   Wound Thickness Description not for Pressure Injury Full thickness 02/25/22 1529   Number of days: 118       Wound 10/30/21 #3 Right Great Toe (Active)   Wound Image   02/25/22 1532   Wound Etiology Diabetic 02/25/22 1532   Dressing Status Clean;Dry; Intact; New dressing applied 02/25/22 1600   Wound Cleansed Soap and water 02/25/22 1532   Dressing/Treatment Moisturizing cream;Collagen;Alginate with Ag 02/25/22 1600   Offloading for Diabetic Foot Ulcers No offloading required 01/29/22 0830   Wound Length (cm) 1 cm 02/25/22 1532   Wound Width (cm) 1.3 cm 02/25/22 1532   Wound Depth (cm) 0.1 cm 02/25/22 1532   Wound Surface Area (cm^2) 1.3 cm^2 02/25/22 1532   Change in Wound Size % (l*w) -160 02/25/22 1532   Wound Volume (cm^3) 0.13 cm^3 02/25/22 1532   Wound Healing % -30 02/25/22 1532   Post-Procedure Length (cm) 1 cm 01/06/22 1128   Post-Procedure Width (cm) 1.1 cm 01/06/22 1128   Post-Procedure Depth (cm) 0.2 cm 01/06/22 1128   Post-Procedure Surface Area (cm^2) 1.1 cm^2 01/06/22 1128   Post-Procedure Volume (cm^3) 0.22 cm^3 01/06/22 1128   Distance Tunneling (cm) 0 cm 02/25/22 1532   Tunneling Position ___ O'Clock 0 02/25/22 1532   Undermining Starts ___ O'Clock 0 02/25/22 1532   Undermining Ends___ O'Clock 0 02/25/22 1532   Undermining Maxium Distance (cm) 0 02/25/22 1532   Wound Assessment Pink/red;Slough 02/25/22 1532   Drainage Amount Moderate 02/25/22 1532   Drainage Description Serosanguinous; Yellow 02/25/22 1532   Odor None 02/25/22 1532   Dina-wound Assessment Dry/flaky 02/25/22 1532   Margins Defined edges 02/25/22 1532   Wound Thickness Description not for Pressure Injury Full thickness 02/25/22 1532   Number of days: 118       Wound 02/25/22 #7 Right Dorsal 2nd Toe (Active)   Wound Image   02/25/22 1532   Dressing Status Clean;Dry; Intact; New dressing applied 02/25/22 1600   Wound Cleansed Soap and water 02/25/22 1532   Dressing/Treatment Moisturizing cream;Collagen;Alginate with Ag 02/25/22 1600   Offloading for Diabetic Foot Ulcers Offloading not required 02/25/22 1532   Wound Length (cm) 0.9 cm 02/25/22 1532   Wound Width (cm) 1 cm 02/25/22 1532   Wound Depth (cm) 0.1 cm 02/25/22 1532   Wound Surface Area (cm^2) 0.9 cm^2 02/25/22 1532   Wound Volume (cm^3) 0.09 cm^3 02/25/22 1532   Distance Tunneling (cm) 0 cm 02/25/22 1532   Tunneling Position ___ O'Clock 0 02/25/22 1532   Undermining Starts ___ O'Clock 0 02/25/22 1532   Undermining Ends___ O'Clock 0 02/25/22 1532   Undermining Maxium Distance (cm) 0 02/25/22 1532   Wound Assessment Pink/red 02/25/22 1532   Drainage Amount Moderate 02/25/22 1532   Drainage Description Serosanguinous 02/25/22 1532   Odor None 02/25/22 1532   Idna-wound Assessment Intact 02/25/22 1532   Margins Attached edges 02/25/22 1532   Wound Thickness Description not for Pressure Injury Full thickness 02/25/22 1532   Number of days: 0       Assessment:       Problem List Items Addressed This Visit     WD-PVD (peripheral vascular disease) ([facility] [address] - Primary    WD-Diabetic ulcer of toe of right foot associated with type 2 diabetes mellitus, with fat layer exposed ([address]    WD-Diabetic ulcer of toe of left foot associated with type 2 diabetes mellitus, with fat layer exposed ([address]          Status of wound progress and description from last visit:   Improved today  Would continue with conservative wound therapy and avoid debridements. Plan:     Discharge Instructions         Discharge Instructions        PHYSICIAN ORDERS AND DISCHARGE INSTRUCTIONS     NOTE: Upon discharge from the [address], you will receive a patient experience survey. We would be grateful if you would take the time to fill this survey out.     Wound care order history:                    Vascular studies: 6/8/2021 Date 11/12/2021              Imaging:               Cultures: Left 2nd toe bone obtained on 11/12/2021               Grafts:  Date               Antibiotics:               Earlier Wound care treatments:               HEIDY carballo physician:      Continuing wound care orders and information:              Residence:                Continue home health care with:            Wound Medications:              TTOYP cleansing:                           AV not scrub or use excessive force.                          Wash hands with soap and water before and after dressing changes.                           Prior to applying a clean dressing, cleanse wound with normal saline,                                wound cleanser, or mild soap and water.                           Ask the physician or nurse before getting the wound(s) wet in a shower              Daily Wound management:                          Keep weight off wounds and reposition every 2 hours.                          Avoid standing for long periods of time.                          Apply wraps/stockings in AM and remove at bedtime.                          If swelling is present, elevate legs to the level of the heart or above for 30                                                         minutes 4-5 times a day and/or when sitting.                                             UXMD taking antibiotics take entire prescription as ordered by physician                                                         RM not stop taking until medicine is all gone.          Wound Care Notes:            Given surgical shoes 11/22/21      Orders for this week: 1/6/2022     Right 2nd toe-- keep clean and dry. Monitor for drainage      Right great toe, Left great toe, Left 2nd toe -- wash with soap,and water, pat dry. Moisturize both legs and feet with A+D. Apply Gentamicin and stimulin powder to wound bed and cover with silver alginate and qwick. Wrap with conform and tape. Change daily  Gave surgical shoes 11/22/21   Arterial referral on  12/14/2021 to [facility]         Follow up with Dr Jose Anne in [facility]. in the wound care center. Call [phone] for any questions or concerns.   Date__________   Time____________  Central Scheduling: [phone]                Treatment Note Wound 10/30/21   #1 Left Dorsal Great Toe-Dressing/Treatment: Moisturizing cream,Collagen,Alginate with Ag (A&D-elinor,gentamicin,stimulen, kin alg Ag,qwick,conform,tape,)  Wound 10/30/21   #2 Left Dorsal Second Toe -Dressing/Treatment: Moisturizing cream,Collagen,Alginate with Ag (A&D-elinor,gentamicin,stimulen, kni alg Ag,qwick,conform,tape,)  Wound 02/25/22 #7 Right Dorsal 2nd Toe-Dressing/Treatment: Moisturizing cream,Collagen,Alginate with Ag (A&D-elinor,gentamicin,stimulen, kin alg Ag,qwick,conform,tape,)  Wound 10/30/21 #3 Right Great Toe-Dressing/Treatment: Moisturizing cream,Collagen,Alginate with Ag (A&D-elinor,gentamicin,stimulen, kin alg Ag,qwick,conform,tape,)    Written Patient Dismissal Instructions Given            Electronically signed by Gretchen Barrera MD on 2/25/2022 at 4:50 PM

## 2022-03-18 ENCOUNTER — HOSPITAL ENCOUNTER (OUTPATIENT)
Dept: WOUND CARE | Age: 72
Discharge: HOME | End: 2022-03-18
Payer: MEDICARE

## 2022-03-18 VITALS
SYSTOLIC BLOOD PRESSURE: 156 MMHG | RESPIRATION RATE: 16 BRPM | HEART RATE: 72 BPM | DIASTOLIC BLOOD PRESSURE: 86 MMHG | TEMPERATURE: 97.6 F

## 2022-03-18 DIAGNOSIS — I73.9 PVD (PERIPHERAL VASCULAR DISEASE) (HCC): ICD-10-CM

## 2022-03-18 DIAGNOSIS — E11.621 DIABETIC ULCER OF TOE OF RIGHT FOOT ASSOCIATED WITH TYPE 2 DIABETES MELLITUS, WITH FAT LAYER EXPOSED (HCC): ICD-10-CM

## 2022-03-18 DIAGNOSIS — L97.512 DIABETIC ULCER OF TOE OF RIGHT FOOT ASSOCIATED WITH TYPE 2 DIABETES MELLITUS, WITH FAT LAYER EXPOSED (HCC): ICD-10-CM

## 2022-03-18 DIAGNOSIS — L97.524 CHRONIC FOOT ULCER, LEFT, WITH NECROSIS OF BONE (HCC): Primary | ICD-10-CM

## 2022-03-18 DIAGNOSIS — L97.522 DIABETIC ULCER OF TOE OF LEFT FOOT ASSOCIATED WITH TYPE 2 DIABETES MELLITUS, WITH FAT LAYER EXPOSED (HCC): ICD-10-CM

## 2022-03-18 DIAGNOSIS — E11.621 DIABETIC ULCER OF TOE OF LEFT FOOT ASSOCIATED WITH TYPE 2 DIABETES MELLITUS, WITH FAT LAYER EXPOSED (HCC): ICD-10-CM

## 2022-03-18 PROCEDURE — 99213 OFFICE O/P EST LOW 20 MIN: CPT

## 2022-03-18 RX ORDER — BACITRACIN, NEOMYCIN, POLYMYXIN B 400; 3.5; 5 [USP'U]/G; MG/G; [USP'U]/G
OINTMENT TOPICAL ONCE
Status: CANCELLED | OUTPATIENT
Start: 2022-03-18 | End: 2022-03-18

## 2022-03-18 RX ORDER — LIDOCAINE 40 MG/G
CREAM TOPICAL ONCE
Status: CANCELLED | OUTPATIENT
Start: 2022-03-18 | End: 2022-03-18

## 2022-03-18 RX ORDER — LIDOCAINE HYDROCHLORIDE 20 MG/ML
JELLY TOPICAL ONCE
Status: CANCELLED | OUTPATIENT
Start: 2022-03-18 | End: 2022-03-18

## 2022-03-18 RX ORDER — CLOBETASOL PROPIONATE 0.5 MG/G
OINTMENT TOPICAL ONCE
Status: CANCELLED | OUTPATIENT
Start: 2022-03-18 | End: 2022-03-18

## 2022-03-18 RX ORDER — LIDOCAINE HYDROCHLORIDE 40 MG/ML
SOLUTION TOPICAL ONCE
Status: CANCELLED | OUTPATIENT
Start: 2022-03-18 | End: 2022-03-18

## 2022-03-18 RX ORDER — GINSENG 100 MG
CAPSULE ORAL ONCE
Status: CANCELLED | OUTPATIENT
Start: 2022-03-18 | End: 2022-03-18

## 2022-03-18 RX ORDER — GENTAMICIN SULFATE 1 MG/G
OINTMENT TOPICAL ONCE
Status: CANCELLED | OUTPATIENT
Start: 2022-03-18 | End: 2022-03-18

## 2022-03-18 RX ORDER — BACITRACIN ZINC AND POLYMYXIN B SULFATE 500; 1000 [USP'U]/G; [USP'U]/G
OINTMENT TOPICAL ONCE
Status: CANCELLED | OUTPATIENT
Start: 2022-03-18 | End: 2022-03-18

## 2022-03-18 RX ORDER — LIDOCAINE 50 MG/G
OINTMENT TOPICAL ONCE
Status: CANCELLED | OUTPATIENT
Start: 2022-03-18 | End: 2022-03-18

## 2022-03-18 RX ORDER — BETAMETHASONE DIPROPIONATE 0.05 %
OINTMENT (GRAM) TOPICAL ONCE
Status: CANCELLED | OUTPATIENT
Start: 2022-03-18 | End: 2022-03-18

## 2022-03-18 ASSESSMENT — PAIN SCALES - GENERAL: PAINLEVEL_OUTOF10: 8

## 2022-03-18 ASSESSMENT — PAIN DESCRIPTION - FREQUENCY: FREQUENCY: CONTINUOUS

## 2022-03-18 ASSESSMENT — PAIN DESCRIPTION - PAIN TYPE: TYPE: CHRONIC PAIN

## 2022-03-18 ASSESSMENT — PAIN DESCRIPTION - DESCRIPTORS: DESCRIPTORS: SORE

## 2022-03-18 ASSESSMENT — PAIN DESCRIPTION - PROGRESSION: CLINICAL_PROGRESSION: NOT CHANGED

## 2022-03-18 ASSESSMENT — PAIN DESCRIPTION - ORIENTATION: ORIENTATION: LEFT;RIGHT

## 2022-03-18 ASSESSMENT — PAIN DESCRIPTION - LOCATION: LOCATION: FOOT

## 2022-03-18 ASSESSMENT — PAIN DESCRIPTION - ONSET: ONSET: ON-GOING

## 2022-03-18 ASSESSMENT — PAIN - FUNCTIONAL ASSESSMENT: PAIN_FUNCTIONAL_ASSESSMENT: PREVENTS OR INTERFERES SOME ACTIVE ACTIVITIES AND ADLS

## 2022-03-18 NOTE — PROGRESS NOTES
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    Neuropathy     both feet    neuropathy     PAD (peripheral artery disease) ([facility] [address] 09/27/2018    [facility]) [facility]     Sick sinus syndrome (HCC)     Sleep apnea     \"sleep study 3 yrs ago- could not tolerate the cpap made me too dry\"    Spinal stenosis     Teeth missing     Upper And Lower    Type 2 diabetes mellitus without complication ([facility] [address]     WD-Chronic foot ulcer, left, with necrosis of bone ([facility] [address] 11/12/2021       PAST SURGICAL HISTORY    Past Surgical History:   Procedure Laterality Date    CARDIAC CATHETERIZATION      per old chart done 10/2014    CARDIAC CATHETERIZATION  07/14/2017    with angiography of leg    CARDIAC CATHETERIZATION  11/20/2018    PATENT STENTS OF ALL THREE MAJOR VESSELS    CARDIAC DEFIBRILLATOR PLACEMENT  06/04/2010    [employer] Seceusebio DIAZ Defibrillator Implanted    COLECTOMY Right 08/26/2016    laparascopic; robotic assisted    COLONOSCOPY  08/04/2016    CORONARY ANGIOPLASTY      \"15 Heart Stents\"    CORONARY ANGIOPLASTY WITH STENT PLACEMENT      per old chart had angio with stent to circumflex and obtuse marginal artery at [facility] 5/2010( old chart also gives hx of stent placement done 2000,2004 and 2005)    DENTAL SURGERY      Teeth Extracted In Past    IR TUNNELED CATHETER PLACEMENT GREATER THAN 5 YEARS  6/14/2021    IR TUNNELED CATHETER PLACEMENT GREATER THAN 5 YEARS 6/14/2021 [facility]    PACEMAKER PLACEMENT  06/04/2010    Medtronic Secura DR Defibrillator Implanted    TOE AMPUTATION Right 09/12/2017    Rt 3rd toe    TOE AMPUTATION Right 01/09/2018     Right 5th toe amputation and Toenails trimmed left 2,3,4 and 5th toes    TOE AMPUTATION Left 12/26/2020    LEFT GREAT TOE AMPUTATION performed by Lis Durant MD at [facility]      per old chart had balloon angioplasty right superfical femoral artery,right popliteal artery,,right ant.tibial artery, right tibioperoneal trunk, and right post.tibial artery wna stent placement right popliteal artery and superfical femoral artery 2012       FAMILY HISTORY    Family History   Problem Relation Age of Onset    Diabetes Mother     Stroke Mother     High Blood Pressure Mother    Le Vision Loss Mother     Cancer Father         Prostate Cancer    Diabetes Sister     Neuropathy Sister     Other Sister         \"Breathing Problems\"    Heart Disease Sister     Early Death Sister 62        Heart Complications    Cancer Brother         \"Stomach Cancer\"    High Blood Pressure Brother     Diabetes Brother     Heart Disease Brother     High Blood Pressure Brother     Cancer Son         \"Testicle Cancer\"       SOCIAL HISTORY    Social History     Tobacco Use    Smoking status: Former Smoker     Packs/day: 0.25     Years: 36.00     Pack years: 9.00     Types: Cigars     Start date: 1980     Quit date: 10/22/2021     Years since quittin.4    Smokeless tobacco: Never Used    Tobacco comment: Client states he has stopped smoking   Vaping Use    Vaping Use: Never used   Substance Use Topics    Alcohol use: No    Drug use: Yes     Types: Marijuana (Weed)       ALLERGIES    Allergies   Allergen Reactions    Pcn [Penicillins] Hives    Fentanyl Itching       MEDICATIONS    Current Outpatient Medications on File Prior to Encounter   Medication Sig Dispense Refill    lisinopril (PRINIVIL;ZESTRIL) 5 MG tablet Take 1 tablet by mouth daily 30 tablet 0    metOLazone (ZAROXOLYN) 2.5 MG tablet Take 1 tablet by mouth daily 30 tablet 0    torsemide (DEMADEX) 20 MG tablet Take 2 tablets by mouth daily 60 tablet 0    diclofenac sodium (VOLTAREN) 1 % GEL Apply 4 g topically 2 times daily 120 g 5    gabapentin (NEURONTIN) 300 MG capsule TAKE 1 CAPSULE BY MOUTH 3 TIMES DAILY FOR 90 DAYS.  90 capsule 3    amLODIPine (NORVASC) 10 MG tablet Take 1 tablet by mouth daily 30 tablet 5    metaxalone (SKELAXIN) 800 MG tablet Take 800 mg by mouth 3 times daily      SPIRIVA HANDIHALER 18 MCG inhalation capsule       insulin lispro, 1 Unit Dial, (HUMALOG KWIKPEN) 100 UNIT/ML SOPN Inject into the skin 2 times daily Sliding scale dose      atorvastatin (LIPITOR) 40 MG tablet Take 1 tablet by mouth nightly 30 tablet 3    carvedilol (COREG) 3.125 MG tablet Take 1 tablet by mouth 2 times daily 60 tablet 3    albuterol sulfate HFA (VENTOLIN HFA) 108 (90 Base) MCG/ACT inhaler Inhale 2 puffs into the lungs every 4 hours as needed for Wheezing 1 Inhaler 3    Calcium Alginate (ALGICELL CALCIUM DRESSING 4\"X8) MISC Apply 1 Device topically daily 30 each 3    PROMOGRAN COREY WOUND DRESSING MATRIX Apply topically Apply to left daily and cover with gauze 1 Package 3    clopidogrel (PLAVIX) 75 MG tablet Take 1 tablet by mouth daily 30 tablet 11     No current facility-administered medications on file prior to encounter. REVIEW OF SYSTEMS    Pertinent items are noted in HPI. Constitutional: Negative for systemic symptoms including fever, chills and malaise. Objective:      BP (!) 156/86   Pulse 72   Temp 97.6 °F (36.4 °C) (Temporal)   Resp 16     PHYSICAL EXAM       General: The patient is in no acute distress. Mental status:  Patient is appropriate, is  oriented to place and plan of care. Dermatologic exam: Visual inspection of the periwound reveals the skin to be normal in turgor and texture.   Wound exam:  see wound description below     All active wounds listed below with today's date are evaluated      Wound 07/06/17 Other (Comment) Toe (Comment  which one) (Active)   Number of days: 1715       Wound 12/10/20 Foot Left;Lateral fluid filled blister (Active)   Number of days: 462       Wound 10/30/21   #1 Left Dorsal Great Toe (Active)   Wound Image   03/18/22 1608   Wound Etiology Diabetic 03/18/22 1608   Dressing Status New dressing applied 03/18/22 1646   Wound Cleansed Wound cleanser 03/18/22 1608   Dressing/Treatment Moisturizing cream;Collagen;Alginate with Ag 02/25/22 1600   Offloading for Diabetic Foot Ulcers Post op shoe 03/18/22 1608   Wound Length (cm) 1 cm 03/18/22 1608   Wound Width (cm) 2 cm 03/18/22 1608   Wound Depth (cm) 0.3 cm 03/18/22 1608   Wound Surface Area (cm^2) 2 cm^2 03/18/22 1608   Change in Wound Size % (l*w) 36.51 03/18/22 1608   Wound Volume (cm^3) 0.6 cm^3 03/18/22 1608   Wound Healing % 5 03/18/22 1608   Post-Procedure Length (cm) 1.5 cm 01/06/22 1128   Post-Procedure Width (cm) 2 cm 01/06/22 1128   Post-Procedure Depth (cm) 0.5 cm 01/06/22 1128   Post-Procedure Surface Area (cm^2) 3 cm^2 01/06/22 1128   Post-Procedure Volume (cm^3) 1.5 cm^3 01/06/22 1128   Distance Tunneling (cm) 0 cm 03/18/22 1608   Tunneling Position ___ O'Clock 0 03/18/22 1608   Undermining Starts ___ O'Clock 0 03/18/22 1608   Undermining Ends___ O'Clock 0 03/18/22 1608   Undermining Maxium Distance (cm) 0 03/18/22 1608   Wound Assessment Granulation tissue;Slough 03/18/22 1608   Drainage Amount Moderate 03/18/22 1608   Drainage Description Serosanguinous; Yellow 03/18/22 1608   Odor None 03/18/22 1608   Dina-wound Assessment Dry/flaky 03/18/22 1608   Margins Defined edges 03/18/22 1608   Wound Thickness Description not for Pressure Injury Full thickness 03/18/22 1608   Number of days: 139       Wound 10/30/21   #2 Left Dorsal Second Toe  (Active)   Wound Image   03/18/22 1608   Wound Etiology Diabetic 03/18/22 1608   Dressing Status New dressing applied 03/18/22 1646   Wound Cleansed Wound cleanser 03/18/22 1608   Dressing/Treatment Moisturizing cream;Collagen;Alginate with Ag 02/25/22 1600   Offloading for Diabetic Foot Ulcers Post op shoe 03/18/22 1608   Wound Length (cm) 1.7 cm 03/18/22 1608   Wound Width (cm) 1.2 cm 03/18/22 1608   Wound Depth (cm) 0.1 cm 03/18/22 1608   Wound Surface Area (cm^2) 2.04 cm^2 03/18/22 1608   Change in Wound Size % (l*w) 27.14 03/18/22 1608   Wound Volume (cm^3) 0.204 cm^3 03/18/22 1608   Wound O'Clock 0 03/18/22 1608   Undermining Ends___ O'Clock 0 03/18/22 1608   Undermining Maxium Distance (cm) 0 03/18/22 1608   Wound Assessment Granulation tissue 03/18/22 1608   Drainage Amount Moderate 03/18/22 1608   Drainage Description Serosanguinous; Yellow 03/18/22 1608   Odor None 03/18/22 1608   Dina-wound Assessment Dry/flaky 03/18/22 1608   Margins Defined edges 03/18/22 1608   Wound Thickness Description not for Pressure Injury Full thickness 03/18/22 1608   Number of days: 139       Wound 02/25/22 #7 Right Dorsal 2nd Toe (Active)   Wound Image   03/18/22 1608   Wound Etiology Diabetic 03/18/22 1608   Dressing Status New dressing applied 03/18/22 1646   Wound Cleansed Wound cleanser 03/18/22 1608   Dressing/Treatment Moisturizing cream;Collagen;Alginate with Ag 02/25/22 1600   Offloading for Diabetic Foot Ulcers Post op shoe 03/18/22 1608   Wound Length (cm) 0 cm 03/18/22 1608   Wound Width (cm) 0 cm 03/18/22 1608   Wound Depth (cm) 0 cm 03/18/22 1608   Wound Surface Area (cm^2) 0 cm^2 03/18/22 1608   Change in Wound Size % (l*w) 100 03/18/22 1608   Wound Volume (cm^3) 0 cm^3 03/18/22 1608   Wound Healing % 100 03/18/22 1608   Distance Tunneling (cm) 0 cm 03/18/22 1608   Tunneling Position ___ O'Clock 0 03/18/22 1608   Undermining Starts ___ O'Clock 0 03/18/22 1608   Undermining Ends___ O'Clock 0 03/18/22 1608   Undermining Maxium Distance (cm) 0 03/18/22 1608   Wound Assessment Dry;Devitalized tissue;Eschar dry 03/18/22 1608   Drainage Amount Moderate 03/18/22 1608   Drainage Description Serosanguinous 03/18/22 1608   Odor None 03/18/22 1608   Dina-wound Assessment Dry/flaky 03/18/22 1608   Margins Attached edges 03/18/22 1608   Wound Thickness Description not for Pressure Injury Full thickness 03/18/22 1608   Number of days: 21       Assessment:       Problem List Items Addressed This Visit     WD-PVD (peripheral vascular disease) ([facility] [address]    WD-Diabetic ulcer of toe of right foot associated with type 2 diabetes mellitus, with fat layer exposed ([facility] [address]    WD-Diabetic ulcer of toe of left foot associated with type 2 diabetes mellitus, with fat layer exposed ([facility] [address]    WD-Chronic foot ulcer, left, with necrosis of bone ([facility] [address] - Primary          Status of wound progress and description from last visit:   Slightly better today. Plan:     Discharge Instructions         Discharge Instructions        PHYSICIAN ORDERS AND DISCHARGE INSTRUCTIONS     NOTE: Upon discharge from the [address], you will receive a patient experience survey. We would be grateful if you would take the time to fill this survey out.     Wound care order history:                    Vascular studies: 6/8/2021 Date 11/12/2021              Imaging:               Cultures: Left 2nd toe bone obtained on 11/12/2021               Grafts:  Date               Antibiotics:               Earlier Wound care treatments:               QLVZBHM care physician:      Continuing wound care orders and information:              Residence:                Continue home health care with:            Wound Medications:              ICDXJ cleansing:                           UC not scrub or use excessive force.                          Wash hands with soap and water before and after dressing changes.                           Prior to applying a clean dressing, cleanse wound with normal saline,                                wound cleanser, or mild soap and water.                           Ask the physician or nurse before getting the wound(s) wet in a shower              Daily Wound management:                          Keep weight off wounds and reposition every 2 hours.                          Avoid standing for long periods of time.                          QEEQN wraps/stockings in AM and remove at bedtime.                          If swelling is present, elevate legs to the level of the heart or above for 30                                                         minutes 4-5 times a day and/or when sitting.                                             When taking antibiotics take entire prescription as ordered by physician                                                         VS not stop taking until medicine is all gone.          Wound Care Notes:            Given surgical shoes 21      Orders for this week: 2022      Right 2nd toe-- keep clean and dry. Monitor for drainage       Right great toe, Left great toe, Left 2nd toe -- wash with soap,and water, pat dry. Moisturize both legs and feet with A+D. Apply Gentamicin and stimulin powder to wound bed and cover with silver alginate and qwick. Wrap with conform and tape. Change daily  Gave surgical shoes 21   Arterial referral on  2021 to [place] [facility]         Follow up with Dr Ari Johnson in [facility]. in the wound care center. Call [phone] for any questions or concerns.   Date__________   Time____________  Central [phone]                  Treatment Note Wound 22 #7 Right Dorsal 2nd Toe-Dressing/Treatment:  (Gentamicin, Stimulen, ag ca alg, Qwick, conform, tape)  Wound 10/30/21 #3 Right Great Toe-Dressing/Treatment:  (Gentamicin, Stimulen, ag ca alg, Qwick, conform, tape)  Wound 10/30/21   #1 Left Dorsal Great Toe-Dressing/Treatment:  (Gentamicin, Stimulen, ag ca alg, Qwick, conform, tape)  Wound 10/30/21   #2 Left Dorsal Second Toe -Dressing/Treatment:  (Gentamicin, Stimulen, ag ca alg, Qwick, conform, tape)    Written Patient Dismissal Instructions Given            Electronically signed by Jason Mercer MD on 3/18/2022 at 5:07 PM

## 2022-03-30 ENCOUNTER — TELEPHONE (OUTPATIENT)
Dept: CARDIOLOGY CLINIC | Age: 72
End: 2022-03-30

## 2022-04-19 ENCOUNTER — HOSPITAL ENCOUNTER (OUTPATIENT)
Dept: WOUND CARE | Age: 72
Discharge: HOME | End: 2022-04-19
Payer: MEDICARE

## 2022-04-19 VITALS
DIASTOLIC BLOOD PRESSURE: 73 MMHG | RESPIRATION RATE: 16 BRPM | TEMPERATURE: 97 F | SYSTOLIC BLOOD PRESSURE: 148 MMHG | HEART RATE: 67 BPM

## 2022-04-19 DIAGNOSIS — E11.621 DIABETIC ULCER OF TOE OF RIGHT FOOT ASSOCIATED WITH TYPE 2 DIABETES MELLITUS, WITH FAT LAYER EXPOSED (HCC): ICD-10-CM

## 2022-04-19 DIAGNOSIS — L97.522 DIABETIC ULCER OF TOE OF LEFT FOOT ASSOCIATED WITH TYPE 2 DIABETES MELLITUS, WITH FAT LAYER EXPOSED (HCC): ICD-10-CM

## 2022-04-19 DIAGNOSIS — E11.621 DIABETIC ULCER OF TOE OF LEFT FOOT ASSOCIATED WITH TYPE 2 DIABETES MELLITUS, WITH FAT LAYER EXPOSED (HCC): ICD-10-CM

## 2022-04-19 DIAGNOSIS — L97.524 CHRONIC FOOT ULCER, LEFT, WITH NECROSIS OF BONE (HCC): Primary | ICD-10-CM

## 2022-04-19 DIAGNOSIS — L97.512 DIABETIC ULCER OF TOE OF RIGHT FOOT ASSOCIATED WITH TYPE 2 DIABETES MELLITUS, WITH FAT LAYER EXPOSED (HCC): ICD-10-CM

## 2022-04-19 PROCEDURE — 6370000000 HC RX 637 (ALT 250 FOR IP): Performed by: INTERNAL MEDICINE

## 2022-04-19 PROCEDURE — 11042 DBRDMT SUBQ TIS 1ST 20SQCM/<: CPT

## 2022-04-19 RX ORDER — LIDOCAINE 40 MG/G
CREAM TOPICAL ONCE
Status: CANCELLED | OUTPATIENT
Start: 2022-04-19 | End: 2022-04-19

## 2022-04-19 RX ORDER — LIDOCAINE HYDROCHLORIDE 20 MG/ML
JELLY TOPICAL ONCE
Status: CANCELLED | OUTPATIENT
Start: 2022-04-19 | End: 2022-04-19

## 2022-04-19 RX ORDER — CLOBETASOL PROPIONATE 0.5 MG/G
OINTMENT TOPICAL ONCE
Status: CANCELLED | OUTPATIENT
Start: 2022-04-19 | End: 2022-04-19

## 2022-04-19 RX ORDER — LIDOCAINE 50 MG/G
OINTMENT TOPICAL ONCE
Status: CANCELLED | OUTPATIENT
Start: 2022-04-19 | End: 2022-04-19

## 2022-04-19 RX ORDER — BACITRACIN, NEOMYCIN, POLYMYXIN B 400; 3.5; 5 [USP'U]/G; MG/G; [USP'U]/G
OINTMENT TOPICAL ONCE
Status: CANCELLED | OUTPATIENT
Start: 2022-04-19 | End: 2022-04-19

## 2022-04-19 RX ORDER — GENTAMICIN SULFATE 1 MG/G
OINTMENT TOPICAL ONCE
Status: COMPLETED | OUTPATIENT
Start: 2022-04-19 | End: 2022-04-19

## 2022-04-19 RX ORDER — GINSENG 100 MG
CAPSULE ORAL ONCE
Status: CANCELLED | OUTPATIENT
Start: 2022-04-19 | End: 2022-04-19

## 2022-04-19 RX ORDER — GENTAMICIN SULFATE 1 MG/G
OINTMENT TOPICAL ONCE
Status: CANCELLED | OUTPATIENT
Start: 2022-04-19 | End: 2022-04-19

## 2022-04-19 RX ORDER — BETAMETHASONE DIPROPIONATE 0.05 %
OINTMENT (GRAM) TOPICAL ONCE
Status: CANCELLED | OUTPATIENT
Start: 2022-04-19 | End: 2022-04-19

## 2022-04-19 RX ORDER — LIDOCAINE HYDROCHLORIDE 40 MG/ML
SOLUTION TOPICAL ONCE
Status: CANCELLED | OUTPATIENT
Start: 2022-04-19 | End: 2022-04-19

## 2022-04-19 RX ORDER — BACITRACIN ZINC AND POLYMYXIN B SULFATE 500; 1000 [USP'U]/G; [USP'U]/G
OINTMENT TOPICAL ONCE
Status: CANCELLED | OUTPATIENT
Start: 2022-04-19 | End: 2022-04-19

## 2022-04-19 RX ADMIN — GENTAMICIN SULFATE: 1 OINTMENT TOPICAL at 16:22

## 2022-04-19 NOTE — PROGRESS NOTES
Wound Care Center Progress Note With Procedure    Luke Cruz  AGE: 70 y.o. GENDER: male  : 1950  EPISODE DATE:  2022     Subjective:     Chief Complaint   Patient presents with    Wound Check     asa ft         HISTORY of PRESENT ILLNESS      Luke Cruz is a 70 y.o. male who presents today for wound evaluation of both feet. Has a wound to the big toe of the right foot and the first and second toe of the left foot. Patient being seen by myself as a second opinion. Patient has multiple amputations to both feet. Has not been consistent with coming to his appointments in the past.  Is a diabetic and does get numbness tingling burning in his feet. Does have pain to both areas of his feet. Has significant kidney disease and was told by his nephrologist that he can no longer have any surgery given the status of his kidneys. He does not know what his last hemoglobin A1c was. Denies any constitutional symptoms today. PAST MEDICAL HISTORY        Diagnosis Date    Acid reflux     Acute MI [facility]) ,     Arthritis     Back    Broken teeth     Upper Front    CAD (coronary artery disease)     Sees Dr. Marah Maldonado [facility])     per old chart    CHF [facility]estive heart failure) [facility] [address]     Chronic back pain     Chronic kidney disease     STAGE 3 KIDNEY FAILURE- \"from my diabetes- do not follow with any one- have seen Dr Sloane Arteaga in the past\"    Diabetes mellitus ([address] Dx 1965    per old chart pt has been diabetic since age 13    Diabetic neuropathy ([address]     \"in my feet\"    H/O cardiovascular stress test 2016    H/O Doppler ultrasound 2018    Moderate disease of the right lower extremity with an JALEN of 0.72.   Moderate to severe disease of the left lower extremity with an JALEN of 0.55.   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    Neuropathy     both feet    neuropathy     PAD (peripheral artery disease) ([address] 09/27/2018    PVD (peripheral vascular disease) ([address]     Sick sinus syndrome (HCC)     Sleep apnea     \"sleep study 3 yrs ago- could not tolerate the cpap made me too dry\"    Spinal stenosis     Teeth missing     Upper And Lower    Type 2 diabetes mellitus without complication ([address]     WD-Chronic foot ulcer, left, with necrosis of bone ([address] 11/12/2021       PAST SURGICAL HISTORY    Past Surgical History:   Procedure Laterality Date    CARDIAC CATHETERIZATION      per old chart done 10/2014    CARDIAC CATHETERIZATION  07/14/2017    with angiography of leg    CARDIAC CATHETERIZATION  11/20/2018    PATENT STENTS OF ALL THREE MAJOR VESSELS    CARDIAC DEFIBRILLATOR PLACEMENT  06/04/2010    [employer] [address] Defibrillator Implanted    COLECTOMY Right 08/26/2016    laparascopic; robotic assisted    COLONOSCOPY  08/04/2016    CORONARY ANGIOPLASTY      \"15 Heart Stents\"    CORONARY ANGIOPLASTY WITH STENT PLACEMENT      per old chart had angio with stent to circumflex and obtuse marginal artery at [facility] 5/2010( old chart also gives hx of stent placement done 2000,2004 and 2005)    DENTAL SURGERY      Teeth Extracted In Past    IR TUNNELED CATHETER PLACEMENT GREATER THAN 5 YEARS  6/14/2021    IR TUNNELED CATHETER PLACEMENT GREATER THAN 5 YEARS 6/14/2021 [facility]    PACEMAKER PLACEMENT  06/04/2010    [employer] [address] Defibrillator Implanted    TOE AMPUTATION Right 09/12/2017    Rt 3rd toe    TOE AMPUTATION Right 01/09/2018     Right 5th toe amputation and Toenails trimmed left 2,3,4 and 5th toes    TOE AMPUTATION Left 12/26/2020    LEFT GREAT TOE AMPUTATION performed by Ana Jernigan MD at [facility]      per old chart had balloon angioplasty right superfical femoral artery,right popliteal artery,,right ant.tibial artery, right tibioperoneal trunk, and right post.tibial artery wna stent placement right popliteal artery and superfical femoral artery 2012       FAMILY HISTORY    Family History   Problem Relation Age of Onset    Diabetes Mother     Stroke Mother     High Blood Pressure Mother     Vision Loss Mother     Cancer Father         Prostate Cancer    Diabetes Sister     Neuropathy Sister     Other Sister         \"Breathing Problems\"    Heart Disease Sister     Early Death Sister 62        Heart Complications    Cancer Brother         \"Stomach Cancer\"    High Blood Pressure Brother     Diabetes Brother     Heart Disease Brother     High Blood Pressure Brother     Cancer Son         \"Testicle Cancer\"       SOCIAL HISTORY    Social History     Tobacco Use    Smoking status: Former Smoker     Packs/day: 0.25     Years: 36.00     Pack years: 9.00     Types: Cigars     Start date: 1980     Quit date: 10/22/2021     Years since quittin.4    Smokeless tobacco: Never Used    Tobacco comment: Client states he has stopped smoking   Vaping Use    Vaping Use: Never used   Substance Use Topics    Alcohol use: No    Drug use: Yes     Types: Marijuana (Weed)       ALLERGIES    Allergies   Allergen Reactions    Pcn [Penicillins] Hives    Fentanyl Itching       MEDICATIONS    Current Outpatient Medications on File Prior to Encounter   Medication Sig Dispense Refill    amLODIPine (NORVASC) 10 MG tablet TAKE 1 TABLET BY MOUTH EVERY DAY 90 tablet 3    lisinopril (PRINIVIL;ZESTRIL) 5 MG tablet Take 1 tablet by mouth daily 30 tablet 0    metOLazone (ZAROXOLYN) 2.5 MG tablet Take 1 tablet by mouth daily 30 tablet 0    torsemide (DEMADEX) 20 MG tablet Take 2 tablets by mouth daily 60 tablet 0    diclofenac sodium EXTREMITY BILATERAL    WD-Diabetic ulcer of toe of left foot associated with type 2 diabetes mellitus, with fat layer exposed ([address]    Relevant Orders    Initiate Outpatient Wound Care Protocol    XR FOOT LEFT (MIN 3 VIEWS)    VL ANKLE ART BRACHIAL INDICES EXTREMITY BILATERAL    WD-Chronic foot ulcer, left, with necrosis of bone (HCC) - Primary    Relevant Orders    Initiate Outpatient Wound Care Protocol    XR FOOT LEFT (MIN 3 VIEWS)    VL ANKLE ART BRACHIAL INDICES EXTREMITY BILATERAL        Procedure Note    Indications:  Based on my examination of this patient's wound(s) today, sharp excision into necrotic subcutaneous tissue is required to promote healing and evaluate the extent of previous healing. Performed by: Crista Ramirez DPM    Consent obtained: Yes    Time out taken:  Yes    Pain Control: lidocaine       Debridement:Non-excisional Debridement    Using curette the wound(s) was/were sharply debrided down through and including the removal of subcutaneous tissue.         Devitalized Tissue Debrided:  slough    Pre Debridement Measurements:  Are located in the Wound Documentation Flow Sheet    All active wounds listed below with today's date are evaluated  Wound(s)    debrided this date include # : 3     Post  Debridement Measurements:  Wound 07/06/17 Other (Comment) Toe (Comment  which one) (Active)   Number of days: 0770       Wound 12/10/20 Foot Left;Lateral fluid filled blister (Active)   Number of days: 494       Wound 10/30/21   #1 Left Dorsal Great Toe amp site  (Active)   Wound Image   03/18/22 1608   Wound Etiology Diabetic 04/19/22 1543   Dressing Status New dressing applied 04/19/22 1620   Wound Cleansed Soap and water 04/19/22 1543   Dressing/Treatment Moisturizing cream;Collagen;Alginate with Ag 02/25/22 1600   Offloading for Diabetic Foot Ulcers Post op shoe 03/18/22 1608   Wound Length (cm) 0.7 cm 04/19/22 1543   Wound Width (cm) 1.3 cm 04/19/22 1543   Wound Depth (cm) 0.3 cm 04/19/22 1543 Wound Surface Area (cm^2) 0.91 cm^2 04/19/22 1543   Change in Wound Size % (l*w) 71.11 04/19/22 1543   Wound Volume (cm^3) 0.273 cm^3 04/19/22 1543   Wound Healing % 57 04/19/22 1543   Post-Procedure Length (cm) 0.7 cm 04/19/22 1605   Post-Procedure Width (cm) 1.3 cm 04/19/22 1605   Post-Procedure Depth (cm) 0.3 cm 04/19/22 1605   Post-Procedure Surface Area (cm^2) 0.91 cm^2 04/19/22 1605   Post-Procedure Volume (cm^3) 0.273 cm^3 04/19/22 1605   Distance Tunneling (cm) 0 cm 04/19/22 1543   Tunneling Position ___ O'Clock 0 04/19/22 1543   Undermining Starts ___ O'Clock 12 04/19/22 1543   Undermining Ends___ O'Clock 3 04/19/22 1543   Undermining Maxium Distance (cm) 0.2 04/19/22 1543   Wound Assessment [place]/red;Slough 04/19/22 1543   Drainage Amount Moderate 04/19/22 1543   Drainage Description Yellow 04/19/22 1543   Odor None 04/19/22 1543   Dina-wound Assessment Dry/flaky 04/19/22 1543   Margins Defined edges 04/19/22 1543   Wound Thickness Description not for Pressure Injury Full thickness 04/19/22 1543   Number of days: 171       Wound 10/30/21   #2 Left Dorsal Second Toe  (Active)   Wound Image   03/18/22 1608   Wound Etiology Diabetic 04/19/22 1543   Dressing Status New dressing applied 04/19/22 1620   Wound Cleansed Soap and water 04/19/22 1543   Dressing/Treatment Moisturizing cream;Collagen;Alginate with Ag 02/25/22 1600   Offloading for Diabetic Foot Ulcers Post op shoe 04/19/22 1543   Wound Length (cm) 1.7 cm 04/19/22 1543   Wound Width (cm) 0.7 cm 04/19/22 1543   Wound Depth (cm) 0.1 cm 04/19/22 1543   Wound Surface Area (cm^2) 1.19 cm^2 04/19/22 1543   Change in Wound Size % (l*w) 57.5 04/19/22 1543   Wound Volume (cm^3) 0.119 cm^3 04/19/22 1543   Wound Healing % 58 04/19/22 1543   Post-Procedure Length (cm) 1.7 cm 04/19/22 1605   Post-Procedure Width (cm) 0.7 cm 04/19/22 1605   Post-Procedure Depth (cm) 0.1 cm 04/19/22 1605   Post-Procedure Surface Area (cm^2) 1.19 cm^2 04/19/22 1605   Post-Procedure Volume (cm^3) 0.119 cm^3 04/19/22 1605   Distance Tunneling (cm) 0 cm 04/19/22 1543   Tunneling Position ___ O'Clock 0 04/19/22 1543   Undermining Starts ___ O'Clock 0 04/19/22 1543   Undermining Ends___ O'Clock 00 04/19/22 1543   Undermining Maxium Distance (cm) 0 04/19/22 1543   Wound Assessment [place]/red;Slough 04/19/22 1543   Drainage Amount Moderate 04/19/22 1543   Drainage Description Yellow 04/19/22 1543   Odor None 04/19/22 1543   Dina-wound Assessment Dry/flaky 04/19/22 1543   Margins Defined edges 04/19/22 1543   Wound Thickness Description not for Pressure Injury Full thickness 04/19/22 1543   Number of days: 171       Wound 10/30/21 #3 Right Great Toe (Active)   Wound Image   03/18/22 1608   Wound Etiology Diabetic 04/19/22 1543   Dressing Status New dressing applied 04/19/22 1620   Wound Cleansed Soap and water 04/19/22 1543   Dressing/Treatment Moisturizing cream;Collagen;Alginate with Ag 02/25/22 1600   Offloading for Diabetic Foot Ulcers Post op shoe 03/18/22 1608   Wound Length (cm) 0 cm 04/19/22 1543   Wound Width (cm) 0 cm 04/19/22 1543   Wound Depth (cm) 0 cm 04/19/22 1543   Wound Surface Area (cm^2) 0 cm^2 04/19/22 1543   Change in Wound Size % (l*w) 100 04/19/22 1543   Wound Volume (cm^3) 0 cm^3 04/19/22 1543   Wound Healing % 100 04/19/22 1543   Post-Procedure Length (cm) 0.3 cm 04/19/22 1605   Post-Procedure Width (cm) 0.7 cm 04/19/22 1605   Post-Procedure Depth (cm) 0.2 cm 04/19/22 1605   Post-Procedure Surface Area (cm^2) 0.21 cm^2 04/19/22 1605   Post-Procedure Volume (cm^3) 0.042 cm^3 04/19/22 1605   Distance Tunneling (cm) 0 cm 03/18/22 1608   Tunneling Position ___ O'Clock 0 03/18/22 1608   Undermining Starts ___ O'Clock 0 03/18/22 1608   Undermining Ends___ O'Clock 0 03/18/22 1608   Undermining Maxium Distance (cm) 0 03/18/22 1608   Wound Assessment Granulation tissue 03/18/22 1608   Drainage Amount Moderate 03/18/22 1608   Drainage Description Serosanguinous; Yellow 03/18/22 1608   Odor None 03/18/22 1608   Dina-wound Assessment Dry/flaky 03/18/22 1608   Margins Defined edges 03/18/22 1608   Wound Thickness Description not for Pressure Injury Full thickness 03/18/22 1608   Number of days: 171       Wound 02/25/22 #7 Right Dorsal 2nd Toe (Active)   Wound Image   03/18/22 1608   Wound Etiology Diabetic 04/19/22 1543   Dressing Status New dressing applied 04/19/22 1620   Wound Cleansed Soap and water 04/19/22 1543   Dressing/Treatment Moisturizing cream;Collagen;Alginate with Ag 02/25/22 1600   Offloading for Diabetic Foot Ulcers Post op shoe 03/18/22 1608   Wound Length (cm) 0 cm 04/19/22 1543   Wound Width (cm) 0 cm 04/19/22 1543   Wound Depth (cm) 0 cm 04/19/22 1543   Wound Surface Area (cm^2) 0 cm^2 04/19/22 1543   Change in Wound Size % (l*w) 100 04/19/22 1543   Wound Volume (cm^3) 0 cm^3 04/19/22 1543   Wound Healing % 100 04/19/22 1543   Distance Tunneling (cm) 0 cm 03/18/22 1608   Tunneling Position ___ O'Clock 0 03/18/22 1608   Undermining Starts ___ O'Clock 0 03/18/22 1608   Undermining Ends___ O'Clock 0 03/18/22 1608   Undermining Maxium Distance (cm) 0 03/18/22 1608   Wound Assessment Dry;Devitalized tissue;Eschar dry 03/18/22 1608   Drainage Amount Moderate 03/18/22 1608   Drainage Description Serosanguinous 03/18/22 1608   Odor None 03/18/22 1608   Dina-wound Assessment Dry/flaky 03/18/22 1608   Margins Attached edges 03/18/22 1608   Wound Thickness Description not for Pressure Injury Full thickness 03/18/22 1608   Number of days: 53       Percent of Wound(s) Debrided: approximately 100%    Total  Area  Debrided:  2.31 sq cm     Bleeding:  Minimal    Hemostasis Achieved:  by pressure    Procedural Pain:  2  / 10     Post Procedural Pain:  2 / 10     Response to treatment:  Well tolerated by patient. Status of wound progress and description from last visit:   stable.       Plan:       Discharge Instructions       PHYSICIAN ORDERS AND DISCHARGE INSTRUCTIONS     NOTE: Upon discharge from the [address] [facility],[address], you will receive a patient experience survey. We would be grateful if you would take the time to fill this survey out.     Wound care order history:                    Vascular studies: 6/8/2021 Date 11/12/2021              Imaging:  XRAY ordered for left foot 4/19/2022              Cultures: Left 2nd toe bone obtained on 11/12/2021               Grafts:  Date               Antibiotics:               Earlier Wound care treatments:               [email or web address] physician:      Continuing wound care orders and information:              Residence:                Continue home health care with:  Does not remember            Wound Medications:              KDPXT cleansing:                           NF not scrub or use excessive force.                          Wash hands with soap and water before and after dressing changes.                         Prior to applying a clean dressing, cleanse wound with normal saline,                                wound cleanser, or mild soap and water.                           Ask the physician or nurse before getting the wound(s) wet in a shower              Daily Wound management:                          Keep weight off wounds and reposition every 2 hours.                          PCFIF standing for long periods of time.                          IVQUS wraps/stockings in AM and remove at bedtime.                          If swelling is present, elevate legs to the level of the heart or above for 30                                                         minutes 4-5 times a day and/or when sitting.                                             When taking antibiotics take entire prescription as ordered by physician                                                         do not stop taking until medicine is all gone.          Wound Care Notes:            Given surgical shoes 11/22/21      Orders for this week: 4/19/2022      Right 2nd toe-- keep clean and dry.  Monitor for drainage       Right great toe, Left great toe, Left 2nd toe -- wash with soap,and water, pat dry. Moisturize both legs and feet with A+D. Apply Gentamicin and stimulin powder to wound bed and cover with silver alginate and qwick. Wrap with conform and tape. Change daily      Gave surgical shoes 11/22/21   Arterial referral on  12/14/2021 to [place]         Follow up with Dr Oscar Quinones in 1 week in the wound care center. Call [phone] for any questions or concerns. Date__________   Time____________  Central Scheduling: [phone]                     Treatment Note Wound 02/25/22 #7 Right Dorsal 2nd Toe-Dressing/Treatment:  (Gentamicin, Stimuen, ag ca alg, Qwick, conform, tape)  Wound 10/30/21 #3 Right Great Toe-Dressing/Treatment:  (Gentamicin, Stimuen, ag ca alg, Qwick, conform, tape)  Wound 10/30/21   #1 Left Dorsal Great Toe amp site -Dressing/Treatment:  (Gentamicin, Stimuen, ag ca alg, Qwick, conform, tape)  Wound 10/30/21   #2 Left Dorsal Second Toe -Dressing/Treatment:  (Gentamicin, Stimuen, ag ca alg, Qwick, conform, tape)    Written Patient Dismissal Instructions Given     -Patient was seen, evaluated, and treated today  -No signs of active infection. Continue to monitor off antibiotics  -Bilateral foot x-rays obtained today.   Phalanx of the second toe of the left foot is exposed but there does not appear to be any clinical signs of infection.  -Also put an order for ABIs to be done as it appears to the wounds are arterial to a degree in nature and that pedal pulses are nonpalpable today  -Local wound care instructions given to the patient  -Stressed importance of patient being compliant with appointments and to follow-up on a weekly basis  -Any signs of infection before I see the patient again to go to the emergency room  -Follow-up 1 week for recheck sooner if needed         Electronically signed by Flori Schmid DPM on 4/19/2022 at 4:47 PM

## 2022-05-27 ENCOUNTER — TELEPHONE (OUTPATIENT)
Dept: WOUND CARE | Age: 72
End: 2022-05-27

## 2022-07-03 PROCEDURE — 93297 REM INTERROG DEV EVAL ICPMS: CPT | Performed by: INTERNAL MEDICINE

## 2022-07-03 PROCEDURE — 93295 DEV INTERROG REMOTE 1/2/MLT: CPT | Performed by: INTERNAL MEDICINE

## 2022-07-03 PROCEDURE — 93296 REM INTERROG EVL PM/IDS: CPT | Performed by: INTERNAL MEDICINE

## 2022-07-06 ENCOUNTER — PROCEDURE VISIT (OUTPATIENT)
Dept: CARDIOLOGY CLINIC | Age: 72
End: 2022-07-06
Payer: MEDICARE

## 2022-07-06 DIAGNOSIS — Z95.810 ICD (IMPLANTABLE CARDIOVERTER-DEFIBRILLATOR), DUAL, IN SITU: Primary | ICD-10-CM

## 2022-07-06 DIAGNOSIS — I49.5 SINUS NODE DYSFUNCTION (HCC): ICD-10-CM

## 2022-07-14 ENCOUNTER — HOSPITAL ENCOUNTER (OUTPATIENT)
Age: 72
Discharge: HOME | End: 2022-07-14
Payer: MEDICARE

## 2022-07-14 DIAGNOSIS — N17.9 AKI (ACUTE KIDNEY INJURY) (HCC): ICD-10-CM

## 2022-07-14 LAB
ALBUMIN SERPL-MCNC: 3.5 GM/DL (ref 3.4–5)
ALP BLD-CCNC: 123 IU/L (ref 40–128)
ALT SERPL-CCNC: 8 U/L (ref 10–40)
ANION GAP SERPL CALCULATED.3IONS-SCNC: 8 MMOL/L (ref 4–16)
AST SERPL-CCNC: 13 IU/L (ref 15–37)
BILIRUB SERPL-MCNC: 0.3 MG/DL (ref 0–1)
BUN BLDV-MCNC: 19 MG/DL (ref 6–23)
CALCIUM SERPL-MCNC: 9 MG/DL (ref 8.3–10.6)
CHLORIDE BLD-SCNC: 101 MMOL/L (ref 99–110)
CO2: 30 MMOL/L (ref 21–32)
CREAT SERPL-MCNC: 1.8 MG/DL (ref 0.9–1.3)
GFR AFRICAN AMERICAN: 45 ML/MIN/1.73M2
GFR NON-AFRICAN AMERICAN: 37 ML/MIN/1.73M2
GLUCOSE BLD-MCNC: 250 MG/DL (ref 70–99)
MAGNESIUM: 1.9 MG/DL (ref 1.8–2.4)
PHOSPHORUS: 3 MG/DL (ref 2.5–4.9)
POTASSIUM SERPL-SCNC: 5.2 MMOL/L (ref 3.5–5.1)
SODIUM BLD-SCNC: 139 MMOL/L (ref 135–145)
TOTAL PROTEIN: 6.9 GM/DL (ref 6.4–8.2)

## 2022-07-14 PROCEDURE — 36415 COLL VENOUS BLD VENIPUNCTURE: CPT

## 2022-07-14 PROCEDURE — 82570 ASSAY OF URINE CREATININE: CPT

## 2022-07-14 PROCEDURE — 83735 ASSAY OF MAGNESIUM: CPT

## 2022-07-14 PROCEDURE — 84100 ASSAY OF PHOSPHORUS: CPT

## 2022-07-14 PROCEDURE — 80053 COMPREHEN METABOLIC PANEL: CPT

## 2022-07-19 ENCOUNTER — HOSPITAL ENCOUNTER (OUTPATIENT)
Dept: WOUND CARE | Age: 72
Discharge: HOME | End: 2022-07-19
Payer: MEDICARE

## 2022-07-19 VITALS
HEART RATE: 67 BPM | DIASTOLIC BLOOD PRESSURE: 74 MMHG | TEMPERATURE: 97.9 F | RESPIRATION RATE: 20 BRPM | SYSTOLIC BLOOD PRESSURE: 176 MMHG

## 2022-07-19 PROCEDURE — 99213 OFFICE O/P EST LOW 20 MIN: CPT

## 2022-07-19 PROCEDURE — 11042 DBRDMT SUBQ TIS 1ST 20SQCM/<: CPT

## 2022-07-19 PROCEDURE — 11044 DBRDMT BONE 1ST 20 SQ CM/<: CPT

## 2022-07-19 ASSESSMENT — PAIN SCALES - GENERAL: PAINLEVEL_OUTOF10: 5

## 2022-07-19 ASSESSMENT — PAIN DESCRIPTION - ORIENTATION: ORIENTATION: RIGHT;LEFT

## 2022-07-19 ASSESSMENT — PAIN DESCRIPTION - PAIN TYPE: TYPE: CHRONIC PAIN

## 2022-07-19 ASSESSMENT — PAIN DESCRIPTION - LOCATION: LOCATION: FOOT

## 2022-07-19 ASSESSMENT — PAIN DESCRIPTION - ONSET: ONSET: ON-GOING

## 2022-07-19 ASSESSMENT — PAIN DESCRIPTION - FREQUENCY: FREQUENCY: INTERMITTENT

## 2022-07-19 ASSESSMENT — PAIN DESCRIPTION - DESCRIPTORS: DESCRIPTORS: BURNING;STABBING

## 2022-07-19 ASSESSMENT — PAIN - FUNCTIONAL ASSESSMENT: PAIN_FUNCTIONAL_ASSESSMENT: PREVENTS OR INTERFERES SOME ACTIVE ACTIVITIES AND ADLS

## 2022-07-19 NOTE — PROGRESS NOTES
Peyton Buckner DPM    PODIATRIST FOOT AND ANKLE SURGERY WD-Chronic foot ulcer, left, with necrosis of bone (HCC) +2 more    Dx Wound Check ; Referred by MICKY Betancourt - CNP    Reason for Visit         Progress Notes  Peyton Buckner DPM (Physician)   [address] [facility] Progress Note With Procedure     True Theodore  AGE: 70 y.o. GENDER: male  : 1950  EPISODE DATE:  2022      Subjective:           Chief Complaint   Patient presents with    Wound Check       asa ft         HISTORY of PRESENT ILLNESS       True Theodore is a 70 y.o. male who presents today for wound evaluation of both feet. Did not follow-up for his last appointment. Patient did not get the x-rays or the arterial studies I asked him to get. Denies any worsening pain to the area. Says that he thinks bone is exposed to the second toe on the left foot. Does not know what his last hemoglobin A1c was. Denies any constitutional symptoms today. PAST MEDICAL HISTORY     Past Medical History             Diagnosis Date    Acid reflux      Acute MI ([address] ,     Arthritis       Back    Broken teeth       Upper Front    CAD (coronary artery disease)       Sees Dr. Celi Rose [facility])       per old chart    CHF (congestive heart failure) ([address]      Chronic back pain      Chronic kidney disease       STAGE 3 KIDNEY FAILURE- \"from my diabetes- do not follow with any one- have seen Dr Shelbi Rose in the past\"    Diabetes mellitus ([address] Dx 1965     per old chart pt has been diabetic since age 13    Diabetic neuropathy ([address]       \"in my feet\"    H/O cardiovascular stress test 2016    H/O Doppler ultrasound 2018     Moderate disease of the right lower extremity with an JALEN of 0.72. Moderate to severe disease of the left lower extremity with an JALEN of 0.55.     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    Neuropathy       both feet    neuropathy      PAD (peripheral artery disease) ([address] 09/27/2018    PVD (peripheral vascular disease) ([address]      Sick sinus syndrome (HCC)      Sleep apnea       \"sleep study 3 yrs ago- could not tolerate the cpap made me too dry\"    Spinal stenosis      Teeth missing       Upper And Lower    Type 2 diabetes mellitus without complication ([address]      WD-Chronic foot ulcer, left, with necrosis of bone ([address] 11/12/2021            PAST SURGICAL HISTORY     Past Surgical History         Past Surgical History:   Procedure Laterality Date    CARDIAC CATHETERIZATION         per old chart done 10/2014    CARDIAC CATHETERIZATION   07/14/2017     with angiography of leg    CARDIAC CATHETERIZATION   11/20/2018     PATENT STENTS OF ALL THREE MAJOR VESSELS    CARDIAC DEFIBRILLATOR PLACEMENT   06/04/2010     [employer] [address] DR Defibrillator Implanted    COLECTOMY Right 08/26/2016     laparascopic; robotic assisted    COLONOSCOPY   08/04/2016    CORONARY ANGIOPLASTY         \"15 Heart Stents\"    CORONARY ANGIOPLASTY WITH STENT PLACEMENT         per old chart had angio with stent to circumflex and obtuse marginal artery at [facility] 5/2010( old chart also gives hx of stent placement done 2000,2004 and 2005)    DENTAL SURGERY         Teeth Extracted In Past    IR TUNNELED CATHETER PLACEMENT GREATER THAN 5 YEARS   6/14/2021     IR TUNNELED CATHETER PLACEMENT GREATER THAN 5 YEARS 6/14/2021 [place] [facility] SPECIAL PROCEDURES    PACEMAKER PLACEMENT   06/04/2010     [employer] Secura DR Defibrillator Implanted TOE AMPUTATION Right 2017     Rt 3rd toe    TOE AMPUTATION Right 2018      Right 5th toe amputation and Toenails trimmed left 2,3,4 and 5th toes    TOE AMPUTATION Left 2020     LEFT GREAT TOE AMPUTATION performed by Joe Beltran MD at [address] [place] [address]         per old chart had balloon angioplasty right superfical femoral artery,right popliteal artery,,right ant.tibial artery, right tibioperoneal trunk, and right post.tibial artery wna stent placement right popliteal artery and superfical femoral artery 2012            FAMILY HISTORY     Family History         Family History   Problem Relation Age of Onset    Diabetes Mother      Stroke Mother      High Blood Pressure Mother      Vision Loss Mother      Cancer Father           Prostate Cancer    Diabetes Sister      Neuropathy Sister      Other Sister           \"Breathing Problems\"    Heart Disease Sister      Early Death Sister 62         Heart Complications    Cancer Brother           \"Stomach Cancer\"    High Blood Pressure Brother      Diabetes Brother      Heart Disease Brother      High Blood Pressure Brother      Cancer Son           \"Testicle Cancer\"            SOCIAL HISTORY     Social History            Tobacco Use    Smoking status: Former Smoker       Packs/day: 0.25       Years: 36.00       Pack years: 9.00       Types: Cigars       Start date: 1980       Quit date: 10/22/2021       Years since quittin.4    Smokeless tobacco: Never Used    Tobacco comment: Client states he has stopped smoking   Vaping Use    Vaping Use: Never used   Substance Use Topics    Alcohol use: No    Drug use: Yes       Types: Marijuana (Weed)         ALLERGIES          Allergies   Allergen Reactions    Pcn [Penicillins] Hives    Fentanyl Itching         MEDICATIONS            Current Outpatient Medications on File Prior to Encounter   Medication Sig Dispense Refill    amLODIPine (NORVASC) 10 MG tablet TAKE 1 TABLET BY MOUTH EVERY DAY 90 tablet 3    lisinopril (PRINIVIL;ZESTRIL) 5 MG tablet Take 1 tablet by mouth daily 30 tablet 0    metOLazone (ZAROXOLYN) 2.5 MG tablet Take 1 tablet by mouth daily 30 tablet 0    torsemide (DEMADEX) 20 MG tablet Take 2 tablets by mouth daily 60 tablet 0    diclofenac sodium (VOLTAREN) 1 % GEL Apply 4 g topically 2 times daily 120 g 5    gabapentin (NEURONTIN) 300 MG capsule TAKE 1 CAPSULE BY MOUTH 3 TIMES DAILY FOR 90 DAYS. 90 capsule 3    metaxalone (SKELAXIN) 800 MG tablet Take 800 mg by mouth 3 times daily        SPIRIVA HANDIHALER 18 MCG inhalation capsule          insulin lispro, 1 Unit Dial, (HUMALOG KWIKPEN) 100 UNIT/ML SOPN Inject into the skin 2 times daily Sliding scale dose        atorvastatin (LIPITOR) 40 MG tablet Take 1 tablet by mouth nightly 30 tablet 3    carvedilol (COREG) 3.125 MG tablet Take 1 tablet by mouth 2 times daily 60 tablet 3    albuterol sulfate HFA (VENTOLIN HFA) 108 (90 Base) MCG/ACT inhaler Inhale 2 puffs into the lungs every 4 hours as needed for Wheezing 1 Inhaler 3    Calcium Alginate (ALGICELL CALCIUM DRESSING 4\"X8) MISC Apply 1 Device topically daily 30 each 3    PROMOGRAN COREY WOUND DRESSING MATRIX Apply topically Apply to left daily and cover with gauze 1 Package 3    clopidogrel (PLAVIX) 75 MG tablet Take 1 tablet by mouth daily 30 tablet 11      No current facility-administered medications on file prior to encounter. REVIEW OF SYSTEMS     Pertinent items are noted in HPI. Constitutional: Negative for systemic symptoms including fever, chills and malaise. Objective:       BP (!) 148/73   Pulse 67   Temp 97 °F (36.1 °C) (Temporal)   Resp 16      PHYSICAL EXAM     General: The patient is in no acute distress. Mental status:  Patient is appropriate, is  oriented to place and plan of care.   Dermatologic exam: Visual inspection of the periwound reveals the skin to be moist  Wound exam: see wound description below in procedure note Assessment:           Problem List Items Addressed This Visit           WD-Diabetic ulcer of toe of right foot associated with type 2 diabetes mellitus, with fat layer exposed ([address]      Relevant Orders      Initiate Outpatient Wound Care Protocol      XR FOOT RIGHT (MIN 3 VIEWS)      VL ANKLE ART BRACHIAL INDICES EXTREMITY BILATERAL      WD-Diabetic ulcer of toe of left foot associated with type 2 diabetes mellitus, with fat layer exposed ([address]      Relevant Orders      Initiate Outpatient Wound Care Protocol      XR FOOT LEFT (MIN 3 VIEWS)      VL ANKLE ART BRACHIAL INDICES EXTREMITY BILATERAL      WD-Chronic foot ulcer, left, with necrosis of bone (HCC) - Primary      Relevant Orders      Initiate Outpatient Wound Care Protocol      XR FOOT LEFT (MIN 3 VIEWS)      VL ANKLE ART BRACHIAL INDICES EXTREMITY BILATERAL           Procedure Note     Indications:  Based on my examination of this patient's wound(s) today, sharp excision into necrotic subcutaneous tissue is required to promote healing and evaluate the extent of previous healing. Performed by: Idania Allen DPM     Consent obtained: Yes     Time out taken:  Yes     Pain Control: lidocaine        Debridement:Non-excisional Debridement     Using curette the wound(s) was/were sharply debrided down through and including the removal of subcutaneous tissue and bone.          Devitalized Tissue Debrided:  slough     Pre Debridement Measurements:  Are located in the Wound Documentation Flow Sheet     All active wounds listed below with today's date are evaluated  Wound(s)    debrided this date include # : 3      Wound 07/06/17 Other (Comment) Toe (Comment  which one) (Active)   Number of days: 1838       Wound 12/10/20 Foot Left;Lateral fluid filled blister (Active)   Number of days: 585       Wound 10/30/21   #1 Left Dorsal Great Toe amp site  (Active)   Number of days: 262       Wound 10/30/21   #2 Left Dorsal Second Toe  (Active)   Number of days: 262 Wound 10/30/21 #3 Right Great Toe (Active)   Number of days: 262       Wound 02/25/22 #7 Right Dorsal 2nd Toe (Active)   Number of days: 143       Wound 07/19/22 Toe (Comment  which one) Anterior;Right #1 GR (Active)   Wound Image   07/19/22 1325   Wound Cleansed Soap and water 07/19/22 1325   Wound Length (cm) 1.4 cm 07/19/22 1325   Wound Width (cm) 2.2 cm 07/19/22 1325   Wound Depth (cm) 0.3 cm 07/19/22 1325   Wound Surface Area (cm^2) 3.08 cm^2 07/19/22 1325   Wound Volume (cm^3) 0.924 cm^3 07/19/22 1325   Post-Procedure Length (cm) 1.4 cm 07/19/22 1401   Post-Procedure Width (cm) 2.2 cm 07/19/22 1401   Post-Procedure Depth (cm) 0.3 cm 07/19/22 1401   Post-Procedure Surface Area (cm^2) 3.08 cm^2 07/19/22 1401   Post-Procedure Volume (cm^3) 0.924 cm^3 07/19/22 1401   Distance Tunneling (cm) 0 cm 07/19/22 1325   Tunneling Position ___ O'Clock 0 07/19/22 1325   Undermining Starts ___ O'Clock 0 07/19/22 1325   Undermining Ends___ O'Clock 0 07/19/22 1325   Undermining Maxium Distance (cm) 0 07/19/22 1325   Wound Assessment Pink/red 07/19/22 1325   Drainage Amount Moderate 07/19/22 1325   Drainage Description Yellow 07/19/22 1325   Odor None 07/19/22 1325   Dina-wound Assessment Dry/flaky 07/19/22 1325   Margins Defined edges 07/19/22 1325   Wound Thickness Description not for Pressure Injury Full thickness 07/19/22 1325   Number of days: 0       Wound 07/19/22 Toe (Comment  which one) Anterior; Left #2 GR (Active)   Wound Image   07/19/22 1325   Wound Cleansed Soap and water 07/19/22 1325   Wound Length (cm) 1.5 cm 07/19/22 1325   Wound Width (cm) 2.7 cm 07/19/22 1325   Wound Depth (cm) 0.1 cm 07/19/22 1325   Wound Surface Area (cm^2) 4.05 cm^2 07/19/22 1325   Wound Volume (cm^3) 0.405 cm^3 07/19/22 1325   Post-Procedure Length (cm) 1.5 cm 07/19/22 1401   Post-Procedure Width (cm) 2.7 cm 07/19/22 1401   Post-Procedure Depth (cm) 0.1 cm 07/19/22 1401   Post-Procedure Surface Area (cm^2) 4.05 cm^2 07/19/22 1401 Post-Procedure Volume (cm^3) 0.405 cm^3 07/19/22 1401   Distance Tunneling (cm) 0 cm 07/19/22 1325   Tunneling Position ___ O'Clock 0 07/19/22 1325   Undermining Starts ___ O'Clock 0 07/19/22 1325   Undermining Ends___ O'Clock 0 07/19/22 1325   Undermining Maxium Distance (cm) 0 07/19/22 1325   Wound Assessment Pink/red 07/19/22 1325   Drainage Amount Moderate 07/19/22 1325   Drainage Description Yellow 07/19/22 1325   Odor None 07/19/22 1325   Dina-wound Assessment Dry/flaky 07/19/22 1325   Margins Defined edges 07/19/22 1325   Wound Thickness Description not for Pressure Injury Full thickness 07/19/22 1325   Number of days: 0       Wound 07/19/22 Toe (Comment  which one) Anterior; Left #3 2ND (Active)   Wound Image   07/19/22 1325   Wound Cleansed Soap and water 07/19/22 1325   Wound Length (cm) 3.4 cm 07/19/22 1325   Wound Width (cm) 2.2 cm 07/19/22 1325   Wound Depth (cm) 0.3 cm 07/19/22 1325   Wound Surface Area (cm^2) 7.48 cm^2 07/19/22 1325   Wound Volume (cm^3) 2.244 cm^3 07/19/22 1325   Post-Procedure Length (cm) 3.4 cm 07/19/22 1401   Post-Procedure Width (cm) 2.2 cm 07/19/22 1401   Post-Procedure Depth (cm) 0.3 cm 07/19/22 1401   Post-Procedure Surface Area (cm^2) 7.48 cm^2 07/19/22 1401   Post-Procedure Volume (cm^3) 2.244 cm^3 07/19/22 1401   Distance Tunneling (cm) 0 cm 07/19/22 1325   Tunneling Position ___ O'Clock 0 07/19/22 1325   Undermining Starts ___ O'Clock 0 07/19/22 1325   Undermining Ends___ O'Clock 0 07/19/22 1325   Undermining Maxium Distance (cm) 0 07/19/22 1325   Wound Assessment Exposed structure bone;Pink/red;Slough 07/19/22 1325   Drainage Amount Moderate 07/19/22 1325   Drainage Description Yellow 07/19/22 1325   Odor None 07/19/22 1325   Dina-wound Assessment Dry/flaky 07/19/22 1325   Margins Defined edges 07/19/22 1325   Wound Thickness Description not for Pressure Injury Full thickness 07/19/22 1325   Number of days: 0          Percent of Wound(s) Debrided: approximately 100% Total  Area  Debrided:  14.61 sq cm     Bleeding:  Minimal     Hemostasis Achieved:  by pressure     Procedural Pain:  2  / 10     Post Procedural Pain:  2 / 10     Response to treatment:  Well tolerated by patient. Status of wound progress and description from last visit:   stable. Plan:         Discharge Instructions        PHYSICIAN ORDERS AND DISCHARGE INSTRUCTIONS     NOTE: Upon discharge from the [address], you will receive a patient experience survey. We would be grateful if you would take the time to fill this survey out. Wound care order history:                    Vascular studies: 6/8/2021 Date 11/12/2021              Imaging:  XRAY ordered for left foot 4/19/2022              Cultures: Left 2nd toe bone obtained on 11/12/2021              Grafts:  Date              Antibiotics:              Earlier Wound care treatments:              Primary care physician:     Continuing wound care orders and information:              Residence:                Continue home health care with:  Does not remember           Wound Medications:              Wound cleansing:                          Do not scrub or use excessive force. Wash hands with soap and water before and after dressing changes. Prior to applying a clean dressing, cleanse wound with normal saline,                                wound cleanser, or mild soap and water. Ask the physician or nurse before getting the wound(s) wet in a shower              Daily Wound management:                          Keep weight off wounds and reposition every 2 hours. Avoid standing for long periods of time. Apply wraps/stockings in AM and remove at bedtime.                           If swelling is present, elevate legs to the level of the heart or above for 30                                                         minutes 4-5 times a day and/or when sitting. When taking antibiotics take entire prescription as ordered by physician                                                         do not stop taking until medicine is all gone. Wound Care Notes:            Given surgical shoes 21      Orders for this week: 2022      Right 2nd toe-- keep clean and dry. Monitor for drainage      Right great toe, Left great toe, Left 2nd toe -- wash with soap,and water, pat dry. Moisturize both legs and feet with A+D. Apply Gentamicin and stimulin powder to wound bed and cover with silver alginate and qwick. Wrap with conform and tape. Change daily        Gave surgical shoes 21   Arterial referral on  2021 to [facility]        Follow up with Dr Betty Ta in 1 week in the wound care center. Call [phone] for any questions or concerns. Date__________   Time____________  Central [phone]        -Patient was seen, evaluated, and treated today  -Conservative and surgical treatment options discussed in detail with the patient today  -Patient did not get x-rays or arterial studies I asked him to get. -Bone exposed second toe of the left foot. No signs of active infection to any of the wounds today. -Given the amount of bone exposure did discuss second toe amputation of the left foot with the patient. He is okay to proceed with this.  -I would still like him to get the x-ray and arterial studies before see him again. We will plan for surgical intervention of second toe amputation of the left foot in the next week or so.   -Any signs of active infection before see him next week to go to the hospital.  -Follow-up 1 week for recheck to go over x-rays and vascular studies and to talk about surgery preoperative visit

## 2022-07-19 NOTE — PLAN OF CARE
Chief Complaint   Patient presents with   • Back Pain   • Palpitations     feels like she is skipping a breath    • Ankle     left ankle and heal pain    • Arm Pain     left arm        HPI  Palpitations less than a minute.  Sx's once every 3-7 days.  No SOB.  No CP.  No dizziness.  Sx's for two years.    Does not have a cardiologist.    Does not go for walks.      Review of Systems   Constitutional: Negative.    Respiratory: Negative.    Cardiovascular: Positive for palpitations.   Endocrine: Negative.        History reviewed. No pertinent past medical history.    Patient Active Problem List   Diagnosis   • Type 2 diabetes mellitus without complication, without long-term current use of insulin (CMS/HCC)   • Essential hypertension   • Type 2 diabetes mellitus with morbid obesity (CMS/HCC)       Past Surgical History:   Procedure Laterality Date   • Laparoscopy, cholecystectomy         Family History   Problem Relation Age of Onset   • Cancer, Breast Mother    • Hypertension Father    • Heart disease Brother    • Heart disease Brother    • Stroke Brother    • Hypertension Brother          Alcohol Use: Yes                Comment: rare       Tobacco Use: Never              Drug Use:    Never              Current Outpatient Medications   Medication Sig Dispense Refill   • metformin (GLUCOPHAGE) 1000 MG tablet Take 1 tablet by mouth 2 times daily (with meals). 180 tablet 1   • lisinopril (ZESTRIL) 40 MG tablet Take 1 tablet by mouth daily. 90 tablet 1   • atorvastatin (LIPITOR) 40 MG tablet Take 1 tablet by mouth daily. 30 tablet 1     No current facility-administered medications for this visit.       ALLERGIES:  No Known Allergies    Visit Vitals  /74   Pulse 80   Temp 97.6 °F (36.4 °C) (Temporal)   Resp 16   Ht 5' 6\" (1.676 m)   Wt 98.9 kg (217 lb 14.8 oz)   SpO2 99%   BMI 35.17 kg/m²       Physical Exam  Constitutional:       Appearance: She is obese.   Cardiovascular:      Rate and Rhythm: Normal rate and  Problem: Chronic Conditions and Co-morbidities  Goal: Patient's chronic conditions and co-morbidity symptoms are monitored and maintained or improved  Outcome: Progressing Towards Goal     Problem: Pain  Goal: Verbalizes/displays adequate comfort level or baseline comfort level  Outcome: Progressing Towards Goal regular rhythm.   Pulmonary:      Effort: Pulmonary effort is normal.      Breath sounds: Normal breath sounds.   Abdominal:      Palpations: Abdomen is soft.   Musculoskeletal:      Right lower leg: No edema.      Left lower leg: No edema.   Neurological:      Mental Status: She is alert.         Assessment and Plan  Irene PRATHER was seen today for back pain, palpitations, ankle and arm pain.    Diagnoses and all orders for this visit:    Palpitations  -     ELECTROCARDIOGRAM 12-LEAD  -     SERVICE TO CARDIOLOGY    Type 2 diabetes mellitus with morbid obesity (CMS/[facility])    Type 2 diabetes mellitus without complication, without long-term current use of insulin (CMS/[facility])  -     metformin (GLUCOPHAGE) 1000 MG tablet; Take 1 tablet by mouth 2 times daily (with meals).    Essential hypertension  -     lisinopril (ZESTRIL) 40 MG tablet; Take 1 tablet by mouth daily.    Dyslipidemia  -     atorvastatin (LIPITOR) 40 MG tablet; Take 1 tablet by mouth daily.  -     LIPID PANEL WITHOUT REFLEX; Future  -     SGOT; Future  -     SGPT; Future      EKG--sinus, nonspecific t wave abnormality.  Recommend cardiology consult since increased risk with dm, dyslipidemia and htn.    DM--pt had been taking only one pill once a day.  Pt to increase med to one pill twice a day.    Dyslipidemia--new med.  See rx.     htn--well controlled.    Pt to RTC for a separate visit to discuss back pain and ankle pain.

## 2022-07-19 NOTE — DISCHARGE INSTRUCTIONS
PHYSICIAN ORDERS AND DISCHARGE INSTRUCTIONS     NOTE: Upon discharge from the [address], you will receive a patient experience survey. We would be grateful if you would take the time to fill this survey out. Wound care order history:                    Vascular studies: 6/8/2021 Date 11/12/2021              Imaging:  XRAY ordered for left foot 4/19/2022              Cultures: Left 2nd toe bone obtained on 11/12/2021              Grafts:  Date              Antibiotics:              Earlier Wound care treatments:              Primary care physician:     Continuing wound care orders and information:              Residence:                Continue home health care with:  Does not remember           Wound Medications:              Wound cleansing:                          Do not scrub or use excessive force. Wash hands with soap and water before and after dressing changes. Prior to applying a clean dressing, cleanse wound with normal saline,                                wound cleanser, or mild soap and water. Ask the physician or nurse before getting the wound(s) wet in a shower              Daily Wound management:                          Keep weight off wounds and reposition every 2 hours. Avoid standing for long periods of time. Apply wraps/stockings in AM and remove at bedtime. If swelling is present, elevate legs to the level of the heart or above for 30                                                         minutes 4-5 times a day and/or when sitting. When taking antibiotics take entire prescription as ordered by physician                                                         do not stop taking until medicine is all gone.           Wound Care Notes:            Given surgical shoes 11/22/21        Orders for this week: 2022     FAX ORDERS TO [facility]  -for skilled nursing wound care. Right great toe, Left great toe, Left 2nd toe -- wash with soap and water, pat dry. Moisturize both legs and feet with A+D. Apply Gentamicin and stimulin powder to wound bed and cover with silver alginate and qwick. Wrap with conform and tape. Change daily        Gave surgical shoes 21   Arterial referral on  2021 to [place] [facility]        Follow up with Dr Alyssa Aly in 1 week in the wound care center. Call [phone] for any questions or concerns.   Date__________   Time____________  Central [phone]

## 2022-07-21 ENCOUNTER — OFFICE VISIT (OUTPATIENT)
Dept: CARDIOLOGY CLINIC | Age: 72
End: 2022-07-21
Payer: MEDICARE

## 2022-07-21 ENCOUNTER — HOSPITAL ENCOUNTER (OUTPATIENT)
Age: 72
Discharge: HOME | End: 2022-07-21
Payer: MEDICARE

## 2022-07-21 ENCOUNTER — HOSPITAL ENCOUNTER (OUTPATIENT)
Dept: GENERAL RADIOLOGY | Age: 72
Discharge: HOME | End: 2022-07-21
Payer: MEDICARE

## 2022-07-21 VITALS
DIASTOLIC BLOOD PRESSURE: 98 MMHG | HEIGHT: 72 IN | RESPIRATION RATE: 20 BRPM | HEART RATE: 94 BPM | WEIGHT: 248 LBS | SYSTOLIC BLOOD PRESSURE: 200 MMHG | BODY MASS INDEX: 33.59 KG/M2

## 2022-07-21 DIAGNOSIS — M79.89 LEG SWELLING: Primary | ICD-10-CM

## 2022-07-21 DIAGNOSIS — L97.522 DIABETIC ULCER OF TOE OF LEFT FOOT ASSOCIATED WITH TYPE 2 DIABETES MELLITUS, WITH FAT LAYER EXPOSED (HCC): ICD-10-CM

## 2022-07-21 DIAGNOSIS — L97.524 CHRONIC FOOT ULCER, LEFT, WITH NECROSIS OF BONE (HCC): ICD-10-CM

## 2022-07-21 DIAGNOSIS — E11.621 DIABETIC ULCER OF TOE OF RIGHT FOOT ASSOCIATED WITH TYPE 2 DIABETES MELLITUS, WITH FAT LAYER EXPOSED (HCC): ICD-10-CM

## 2022-07-21 DIAGNOSIS — E11.621 DIABETIC ULCER OF TOE OF LEFT FOOT ASSOCIATED WITH TYPE 2 DIABETES MELLITUS, WITH FAT LAYER EXPOSED (HCC): ICD-10-CM

## 2022-07-21 DIAGNOSIS — I73.9 PAD (PERIPHERAL ARTERY DISEASE) (HCC): ICD-10-CM

## 2022-07-21 DIAGNOSIS — L97.512 DIABETIC ULCER OF TOE OF RIGHT FOOT ASSOCIATED WITH TYPE 2 DIABETES MELLITUS, WITH FAT LAYER EXPOSED (HCC): ICD-10-CM

## 2022-07-21 PROCEDURE — 73630 X-RAY EXAM OF FOOT: CPT

## 2022-07-21 PROCEDURE — 99214 OFFICE O/P EST MOD 30 MIN: CPT | Performed by: INTERNAL MEDICINE

## 2022-07-21 PROCEDURE — 1123F ACP DISCUSS/DSCN MKR DOCD: CPT | Performed by: INTERNAL MEDICINE

## 2022-07-21 PROCEDURE — G8427 DOCREV CUR MEDS BY ELIG CLIN: HCPCS | Performed by: INTERNAL MEDICINE

## 2022-07-21 PROCEDURE — 3017F COLORECTAL CA SCREEN DOC REV: CPT | Performed by: INTERNAL MEDICINE

## 2022-07-21 PROCEDURE — G8417 CALC BMI ABV UP PARAM F/U: HCPCS | Performed by: INTERNAL MEDICINE

## 2022-07-21 PROCEDURE — 1036F TOBACCO NON-USER: CPT | Performed by: INTERNAL MEDICINE

## 2022-07-21 NOTE — PROGRESS NOTES
UNIT/ML SOPN Inject into the skin 2 times daily Sliding scale dose      atorvastatin (LIPITOR) 40 MG tablet Take 1 tablet by mouth nightly 30 tablet 3    carvedilol (COREG) 3.125 MG tablet Take 1 tablet by mouth 2 times daily 60 tablet 3    albuterol sulfate HFA (VENTOLIN HFA) 108 (90 Base) MCG/ACT inhaler Inhale 2 puffs into the lungs every 4 hours as needed for Wheezing 1 Inhaler 3    Calcium Alginate (ALGICELL CALCIUM DRESSING 4\"X8) MISC Apply 1 Device topically daily 30 each 3    PROMOGRAN COREY WOUND DRESSING MATRIX Apply topically Apply to left daily and cover with gauze 1 Package 3    clopidogrel (PLAVIX) 75 MG tablet Take 1 tablet by mouth daily 30 tablet 11     No current facility-administered medications for this visit. Allergies: Pcn [penicillins] and Fentanyl  Past Medical History:   Diagnosis Date    Acid reflux     Acute MI ([facility] [address] 2004, 2008    Arthritis     Back    Broken teeth     Upper Front    CAD (coronary artery disease)     Sees Dr. Di Crowder [facility])     per old chart    CHF (congestive heart failure) ([facility] [address]     Chronic back pain     Chronic kidney disease     STAGE 3 KIDNEY FAILURE- \"from my diabetes- do not follow with any one- have seen Dr Robyn Reyes in the past\"    Diabetes mellitus ([facility] [address] Dx 1965    per old chart pt has been diabetic since age 13    Diabetic neuropathy ([facility] [address]     \"in my feet\"    H/O cardiovascular stress test 08/25/2016    H/O Doppler ultrasound 09/27/2018    Moderate disease of the right lower extremity with an JALEN of 0.72. Moderate to severe disease of the left lower extremity with an JLAEN of 0.55.     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    Neuropathy     both feet    neuropathy     PAD (peripheral artery disease) ([facility] [address] 09/27/2018    PVD (peripheral vascular disease) (HCC)     Sick sinus syndrome (HCC)     Sleep apnea     \"sleep study 3 yrs ago- could not tolerate the cpap made me too dry\"    Spinal stenosis     Teeth missing     Upper And Lower    Type 2 diabetes mellitus without complication ([facility] [address]     WD-Chronic foot ulcer, left, with necrosis of bone ([facility] [address] 11/12/2021     Past Surgical History:   Procedure Laterality Date    CARDIAC CATHETERIZATION      per old chart done 10/2014    CARDIAC CATHETERIZATION  07/14/2017    with angiography of leg    CARDIAC CATHETERIZATION  11/20/2018    PATENT STENTS OF ALL THREE MAJOR VESSELS    CARDIAC DEFIBRILLATOR PLACEMENT  06/04/2010    [employer] [address] DR Defibrillator Implanted    COLECTOMY Right 08/26/2016    laparascopic; robotic assisted    COLONOSCOPY  08/04/2016    CORONARY ANGIOPLASTY      \"15 Heart Stents\"    CORONARY ANGIOPLASTY WITH STENT PLACEMENT      per old chart had angio with stent to circumflex and obtuse marginal artery at [facility] 5/2010( old chart also gives hx of stent placement done 2000,2004 and 2005)    DENTAL SURGERY      Teeth Extracted In Past    IR TUNNELED CATHETER PLACEMENT GREATER THAN 5 YEARS  6/14/2021    IR TUNNELED CATHETER PLACEMENT GREATER THAN 5 YEARS 6/14/2021 [facility] SPECIAL PROCEDURES    PACEMAKER PLACEMENT  06/04/2010    [employer] Secura DR Defibrillator Implanted    TOE AMPUTATION Right 09/12/2017    Rt 3rd toe    TOE AMPUTATION Right 01/09/2018     Right 5th toe amputation and Toenails trimmed left 2,3,4 and 5th toes    TOE AMPUTATION Left 12/26/2020    LEFT GREAT TOE AMPUTATION performed by Cresencio Ormond, MD at [address] [place] [address]      per old chart had balloon angioplasty right superfical femoral artery,right popliteal artery,,right ant.tibial artery, right tibioperoneal trunk, and right post.tibial artery wna stent placement right popliteal artery and superfical femoral artery 2012     Family History   Problem Relation Age of Onset    Diabetes Mother     Stroke Mother     High Blood Pressure Mother     Vision Loss Mother     Cancer Father         Prostate Cancer    Diabetes Sister     Neuropathy Sister     Other Sister         \"Breathing Problems\"    Heart Disease Sister     Early Death Sister 62        Heart Complications    Cancer Brother         \"Stomach Cancer\"    High Blood Pressure Brother     Diabetes Brother     Heart Disease Brother     High Blood Pressure Brother     Cancer Son         \"Testicle Cancer\"     Social History     Tobacco Use    Smoking status: Former     Packs/day: 0.25     Years: 36.00     Pack years: 9.00     Types: [de-identified], Cigarettes     Start date: 1980     Quit date: 10/22/2021     Years since quittin.7    Smokeless tobacco: Never    Tobacco comments:     Client states he has stopped smoking   Substance Use Topics    Alcohol use: No      [unfilled]  Review of Systems:   Constitutional: No Fever or Weight Loss   Eyes: No Decreased Vision  ENT: No Headaches, Hearing Loss or Vertigo  Cardiovascular: No chest pain, dyspnea on exertion, palpitations or loss of consciousness  Respiratory: No cough or wheezing    Gastrointestinal: No abdominal pain, appetite loss, blood in stools, constipation, diarrhea or heartburn  Genitourinary: No dysuria, trouble voiding, or hematuria  Musculoskeletal:  No gait disturbance, weakness or joint complaints  Integumentary: No rash or pruritis  Neurological: No TIA or stroke symptoms  Psychiatric: No anxiety or depression  Endocrine: No malaise, fatigue or temperature intolerance  Hematologic/Lymphatic: No bleeding problems, blood clots or swollen lymph nodes  Allergic/Immunologic: No nasal congestion or hives  All systems negative except as marked.    Objective:  BP (!) 200/98 (Site: Right Upper Arm, Position: Sitting, Cuff Size: Medium Adult)   Pulse 94   Resp 20   Ht 6' (1.829 m)   Wt 248 lb (112.5 kg)   BMI 33.63 kg/m²   Wt Readings from Last 3 Encounters:   07/21/22 248 lb (112.5 kg)   02/02/22 241 lb 6.5 oz (109.5 kg)   10/28/21 250 lb (113.4 kg)     Body mass index is 33.63 kg/m². GENERAL - Alert, oriented, pleasant, in no apparent distress,normal grooming  HEENT - pupils are intact, cornea intact, conjunctive normal color, ears are normal in exam,  Neck - Supple. No jugular venous distention noted. No carotid bruits, no apical -carotid delay  Respiratory:  Normal breath sounds, No respiratory distress, No wheezing, No chest tenderness. ,no use of accessory muscles, diaphragm movement is normal  Cardiovascular: (PMI) apex non displaced,no lifts no thrills, no s3,no s4, Normal heart rate, Normal rhythm, No murmurs, No rubs, No gallops. Carotid arteries pulse and amplitude are normal no bruit, no abdominal bruit noted ( normal abdominal aorta ausculation),   Extremities - No cyanosis, clubbing, or significant edema, no varicose veins    Abdomen - No masses, tenderness, or organomegaly, no hepato-splenomegally, no bruits  Musculoskeletal +  edema, no kyphosis or scoliosis, no deformity in any extremity noted, muscle strength and tone are normal. Left toe amputation, exposed bone  Skin: no ulcer,no scar,no stasis dermatitis   Neurologic - alert oriented times 3,Cranial nerves II through XII are grossly intact. There were no gross focal neurologic abnormalities.    Psychiatric: normal mood and affect    Lab Results   Component Value Date/Time    CKTOTAL 120 06/07/2021 07:39 PM    CKMB 12.7 07/22/2012 08:07 AM    TROPONINI 0.018 04/28/2014 11:30 PM    TROPONINI 0.027 09/08/2011 06:02 PM     BNP:    Lab Results   Component Value Date/Time    BNP 67 03/27/2014 01:50 PM     PT/INR:  No results found for: PTINR  Lab Results   Component Value Date    LABA1C 8.0 (H) 01/29/2022    LABA1C 7.9 (H) 06/07/2021     Lab Results   Component Value Date CHOL 79 12/11/2020    TRIG 61 12/11/2020    HDL 30 (L) 12/11/2020    LDLCALC 70 01/25/2016    LDLDIRECT 44 12/11/2020     Lab Results   Component Value Date    ALT 8 (L) 07/14/2022    AST 13 (L) 07/14/2022     TSH:  No results found for: TSH    PACER/ICD  INTERROGATION      LIFE/VOLTAGE:2.4 yrs  A FIB:No  ATRIAL PACING:  VENTRICULAR PACING:  SHOCKS:No  OPTIVOL:high  FU AS SCHEDULED      Impression:  Rubina Sloan is a 70 y. o.year old who  has a past medical history of Acid reflux, Acute MI ([address] Arthritis, Broken teeth, CAD (coronary artery disease), Cardiomyopathy ([address] CHF (congestive heart failure) ([address] Chronic back pain, Chronic kidney disease, Diabetes mellitus ([address] Diabetic neuropathy ([address] H/O cardiovascular stress test, H/O Doppler ultrasound, H/O percutaneous left heart catheterization, History of irregular heartbeat, History of syncope, Hyperlipidemia, Hypertension, Leg swelling, Necrotic toes (HCC), Neuropathy, neuropathy, PAD (peripheral artery disease) ([address] PVD (peripheral vascular disease) ([address] Sick sinus syndrome ([address] Sleep apnea, Spinal stenosis, Teeth missing, Type 2 diabetes mellitus without complication ([address] and WD-Chronic foot ulcer, left, with necrosis of bone ([address] and presents with     Plan:  CAD AND  cardiomyopathy with CKD: [facility] performed 2018 ,patient stent, will continue medical treatment ,  HIGH OPTIVOL: most llikley fluid over load to CKD, already on 2 diuretics, aaron\l recommend to see nephrology  Leg swelling: stable  renal failure and cardiomyopathy induced, will get venous doppler to check for venous insufficiency, and use compression wraps  CKD: stable  PAD: will need amputation of left toe, will check arterial doppler  ICD: stable generator change'Health maintenance: exerise and diet  All labs, medications and tests reviewed, continue all other medications of all above medical condition listed as is.     [unfilled]

## 2022-07-25 ENCOUNTER — HOSPITAL ENCOUNTER (INPATIENT)
Age: 72
LOS: 4 days | Discharge: HOME HEALTH | DRG: 617 | End: 2022-07-29
Attending: INTERNAL MEDICINE | Admitting: INTERNAL MEDICINE
Payer: MEDICARE

## 2022-07-25 ENCOUNTER — APPOINTMENT (OUTPATIENT)
Dept: GENERAL RADIOLOGY | Age: 72
DRG: 617 | End: 2022-07-25
Payer: MEDICARE

## 2022-07-25 DIAGNOSIS — E11.621 DIABETIC ULCER OF TOE ASSOCIATED WITH TYPE 2 DIABETES MELLITUS, WITH BONE INVOLVEMENT WITHOUT EVIDENCE OF NECROSIS, UNSPECIFIED LATERALITY (HCC): Primary | ICD-10-CM

## 2022-07-25 DIAGNOSIS — M86.9 TOE OSTEOMYELITIS (HCC): ICD-10-CM

## 2022-07-25 DIAGNOSIS — S90.415A INFECTED ABRASION OF TOE OF LEFT FOOT, INITIAL ENCOUNTER: ICD-10-CM

## 2022-07-25 DIAGNOSIS — L08.9 INFECTED ABRASION OF TOE OF LEFT FOOT, INITIAL ENCOUNTER: ICD-10-CM

## 2022-07-25 DIAGNOSIS — L03.119 CELLULITIS OF FOOT: ICD-10-CM

## 2022-07-25 DIAGNOSIS — L97.506 DIABETIC ULCER OF TOE ASSOCIATED WITH TYPE 2 DIABETES MELLITUS, WITH BONE INVOLVEMENT WITHOUT EVIDENCE OF NECROSIS, UNSPECIFIED LATERALITY (HCC): Primary | ICD-10-CM

## 2022-07-25 PROBLEM — L97.519 DIABETIC ULCER OF RIGHT FOOT DUE TO TYPE 2 DIABETES MELLITUS (HCC): Status: ACTIVE | Noted: 2022-07-25

## 2022-07-25 LAB
ALBUMIN SERPL-MCNC: 3.5 GM/DL (ref 3.4–5)
ALP BLD-CCNC: 105 IU/L (ref 40–129)
ALT SERPL-CCNC: 6 U/L (ref 10–40)
ANION GAP SERPL CALCULATED.3IONS-SCNC: 9 MMOL/L (ref 4–16)
AST SERPL-CCNC: 9 IU/L (ref 15–37)
BASOPHILS ABSOLUTE: 0 K/CU MM
BASOPHILS RELATIVE PERCENT: 0.3 % (ref 0–1)
BILIRUB SERPL-MCNC: 0.3 MG/DL (ref 0–1)
BUN BLDV-MCNC: 26 MG/DL (ref 6–23)
CALCIUM SERPL-MCNC: 8.6 MG/DL (ref 8.3–10.6)
CHLORIDE BLD-SCNC: 96 MMOL/L (ref 99–110)
CO2: 35 MMOL/L (ref 21–32)
CREAT SERPL-MCNC: 1.9 MG/DL (ref 0.9–1.3)
DIFFERENTIAL TYPE: ABNORMAL
EOSINOPHILS ABSOLUTE: 0.1 K/CU MM
EOSINOPHILS RELATIVE PERCENT: 2.2 % (ref 0–3)
GFR AFRICAN AMERICAN: 42 ML/MIN/1.73M2
GFR NON-AFRICAN AMERICAN: 35 ML/MIN/1.73M2
GLUCOSE BLD-MCNC: 177 MG/DL (ref 70–99)
GLUCOSE BLD-MCNC: 203 MG/DL (ref 70–99)
HCT VFR BLD CALC: 32.8 % (ref 42–52)
HEMOGLOBIN: 10.4 GM/DL (ref 13.5–18)
IMMATURE NEUTROPHIL %: 0.2 % (ref 0–0.43)
LACTATE: 0.8 MMOL/L (ref 0.4–2)
LYMPHOCYTES ABSOLUTE: 1.5 K/CU MM
LYMPHOCYTES RELATIVE PERCENT: 24.6 % (ref 24–44)
MCH RBC QN AUTO: 29.1 PG (ref 27–31)
MCHC RBC AUTO-ENTMCNC: 31.7 % (ref 32–36)
MCV RBC AUTO: 91.6 FL (ref 78–100)
MONOCYTES ABSOLUTE: 0.3 K/CU MM
MONOCYTES RELATIVE PERCENT: 5.1 % (ref 0–4)
NUCLEATED RBC %: 0 %
PDW BLD-RTO: 14.4 % (ref 11.7–14.9)
PLATELET # BLD: 206 K/CU MM (ref 140–440)
PMV BLD AUTO: 9.7 FL (ref 7.5–11.1)
POTASSIUM SERPL-SCNC: 4.1 MMOL/L (ref 3.5–5.1)
RBC # BLD: 3.58 M/CU MM (ref 4.6–6.2)
SEGMENTED NEUTROPHILS ABSOLUTE COUNT: 4.1 K/CU MM
SEGMENTED NEUTROPHILS RELATIVE PERCENT: 67.6 % (ref 36–66)
SODIUM BLD-SCNC: 140 MMOL/L (ref 135–145)
TOTAL IMMATURE NEUTOROPHIL: 0.01 K/CU MM
TOTAL NUCLEATED RBC: 0 K/CU MM
TOTAL PROTEIN: 7.2 GM/DL (ref 6.4–8.2)
WBC # BLD: 6 K/CU MM (ref 4–10.5)

## 2022-07-25 PROCEDURE — 82962 GLUCOSE BLOOD TEST: CPT

## 2022-07-25 PROCEDURE — 6370000000 HC RX 637 (ALT 250 FOR IP): Performed by: INTERNAL MEDICINE

## 2022-07-25 PROCEDURE — 87181 SC STD AGAR DILUTION PER AGT: CPT

## 2022-07-25 PROCEDURE — 2580000003 HC RX 258: Performed by: PHYSICIAN ASSISTANT

## 2022-07-25 PROCEDURE — 99285 EMERGENCY DEPT VISIT HI MDM: CPT

## 2022-07-25 PROCEDURE — 87040 BLOOD CULTURE FOR BACTERIA: CPT

## 2022-07-25 PROCEDURE — 87070 CULTURE OTHR SPECIMN AEROBIC: CPT

## 2022-07-25 PROCEDURE — 80053 COMPREHEN METABOLIC PANEL: CPT

## 2022-07-25 PROCEDURE — 2140000000 HC CCU INTERMEDIATE R&B

## 2022-07-25 PROCEDURE — 6360000002 HC RX W HCPCS: Performed by: INTERNAL MEDICINE

## 2022-07-25 PROCEDURE — 96365 THER/PROPH/DIAG IV INF INIT: CPT

## 2022-07-25 PROCEDURE — 83036 HEMOGLOBIN GLYCOSYLATED A1C: CPT

## 2022-07-25 PROCEDURE — 87075 CULTR BACTERIA EXCEPT BLOOD: CPT

## 2022-07-25 PROCEDURE — 87077 CULTURE AEROBIC IDENTIFY: CPT

## 2022-07-25 PROCEDURE — 73630 X-RAY EXAM OF FOOT: CPT

## 2022-07-25 PROCEDURE — 87186 SC STD MICRODIL/AGAR DIL: CPT

## 2022-07-25 PROCEDURE — 96375 TX/PRO/DX INJ NEW DRUG ADDON: CPT

## 2022-07-25 PROCEDURE — 85025 COMPLETE CBC W/AUTO DIFF WBC: CPT

## 2022-07-25 PROCEDURE — 83605 ASSAY OF LACTIC ACID: CPT

## 2022-07-25 PROCEDURE — 2500000003 HC RX 250 WO HCPCS: Performed by: PHYSICIAN ASSISTANT

## 2022-07-25 PROCEDURE — 6360000002 HC RX W HCPCS: Performed by: PHYSICIAN ASSISTANT

## 2022-07-25 RX ORDER — ONDANSETRON 4 MG/1
4 TABLET, ORALLY DISINTEGRATING ORAL EVERY 8 HOURS PRN
Status: DISCONTINUED | OUTPATIENT
Start: 2022-07-25 | End: 2022-07-29 | Stop reason: HOSPADM

## 2022-07-25 RX ORDER — DEXTROSE MONOHYDRATE 100 MG/ML
INJECTION, SOLUTION INTRAVENOUS CONTINUOUS PRN
Status: DISCONTINUED | OUTPATIENT
Start: 2022-07-25 | End: 2022-07-29 | Stop reason: HOSPADM

## 2022-07-25 RX ORDER — ACETAMINOPHEN 650 MG/1
650 SUPPOSITORY RECTAL EVERY 6 HOURS PRN
Status: DISCONTINUED | OUTPATIENT
Start: 2022-07-25 | End: 2022-07-29 | Stop reason: HOSPADM

## 2022-07-25 RX ORDER — METRONIDAZOLE 500 MG/100ML
500 INJECTION, SOLUTION INTRAVENOUS ONCE
Status: COMPLETED | OUTPATIENT
Start: 2022-07-25 | End: 2022-07-25

## 2022-07-25 RX ORDER — SODIUM CHLORIDE 0.9 % (FLUSH) 0.9 %
5-40 SYRINGE (ML) INJECTION PRN
Status: DISCONTINUED | OUTPATIENT
Start: 2022-07-25 | End: 2022-07-29 | Stop reason: HOSPADM

## 2022-07-25 RX ORDER — HYDRALAZINE HYDROCHLORIDE 20 MG/ML
10 INJECTION INTRAMUSCULAR; INTRAVENOUS EVERY 4 HOURS PRN
Status: DISCONTINUED | OUTPATIENT
Start: 2022-07-25 | End: 2022-07-29 | Stop reason: HOSPADM

## 2022-07-25 RX ORDER — ATORVASTATIN CALCIUM 40 MG/1
40 TABLET, FILM COATED ORAL NIGHTLY
Status: DISCONTINUED | OUTPATIENT
Start: 2022-07-25 | End: 2022-07-29 | Stop reason: HOSPADM

## 2022-07-25 RX ORDER — ONDANSETRON 2 MG/ML
4 INJECTION INTRAMUSCULAR; INTRAVENOUS EVERY 6 HOURS PRN
Status: DISCONTINUED | OUTPATIENT
Start: 2022-07-25 | End: 2022-07-29 | Stop reason: HOSPADM

## 2022-07-25 RX ORDER — ACETAMINOPHEN 325 MG/1
650 TABLET ORAL EVERY 6 HOURS PRN
Status: DISCONTINUED | OUTPATIENT
Start: 2022-07-25 | End: 2022-07-29 | Stop reason: HOSPADM

## 2022-07-25 RX ORDER — MORPHINE SULFATE 4 MG/ML
4 INJECTION, SOLUTION INTRAMUSCULAR; INTRAVENOUS EVERY 30 MIN PRN
Status: DISCONTINUED | OUTPATIENT
Start: 2022-07-25 | End: 2022-07-25

## 2022-07-25 RX ORDER — METAXALONE 800 MG/1
800 TABLET ORAL 3 TIMES DAILY
Status: DISCONTINUED | OUTPATIENT
Start: 2022-07-25 | End: 2022-07-29 | Stop reason: HOSPADM

## 2022-07-25 RX ORDER — INSULIN LISPRO 100 [IU]/ML
0-4 INJECTION, SOLUTION INTRAVENOUS; SUBCUTANEOUS NIGHTLY
Status: DISCONTINUED | OUTPATIENT
Start: 2022-07-25 | End: 2022-07-29 | Stop reason: HOSPADM

## 2022-07-25 RX ORDER — SODIUM CHLORIDE 0.9 % (FLUSH) 0.9 %
5-40 SYRINGE (ML) INJECTION EVERY 12 HOURS SCHEDULED
Status: DISCONTINUED | OUTPATIENT
Start: 2022-07-25 | End: 2022-07-29 | Stop reason: HOSPADM

## 2022-07-25 RX ORDER — POLYETHYLENE GLYCOL 3350 17 G/17G
17 POWDER, FOR SOLUTION ORAL DAILY PRN
Status: DISCONTINUED | OUTPATIENT
Start: 2022-07-25 | End: 2022-07-29 | Stop reason: HOSPADM

## 2022-07-25 RX ORDER — METRONIDAZOLE 500 MG/100ML
500 INJECTION, SOLUTION INTRAVENOUS EVERY 6 HOURS
Status: DISCONTINUED | OUTPATIENT
Start: 2022-07-25 | End: 2022-07-28

## 2022-07-25 RX ORDER — AMLODIPINE BESYLATE 10 MG/1
10 TABLET ORAL DAILY
Status: DISCONTINUED | OUTPATIENT
Start: 2022-07-26 | End: 2022-07-29 | Stop reason: HOSPADM

## 2022-07-25 RX ORDER — 0.9 % SODIUM CHLORIDE 0.9 %
1000 INTRAVENOUS SOLUTION INTRAVENOUS ONCE
Status: COMPLETED | OUTPATIENT
Start: 2022-07-25 | End: 2022-07-25

## 2022-07-25 RX ORDER — ALBUTEROL SULFATE 90 UG/1
2 AEROSOL, METERED RESPIRATORY (INHALATION) EVERY 4 HOURS PRN
Status: DISCONTINUED | OUTPATIENT
Start: 2022-07-25 | End: 2022-07-29 | Stop reason: HOSPADM

## 2022-07-25 RX ORDER — CLOPIDOGREL BISULFATE 75 MG/1
75 TABLET ORAL DAILY
Status: DISCONTINUED | OUTPATIENT
Start: 2022-07-26 | End: 2022-07-29 | Stop reason: HOSPADM

## 2022-07-25 RX ORDER — SODIUM CHLORIDE 9 MG/ML
25 INJECTION, SOLUTION INTRAVENOUS PRN
Status: DISCONTINUED | OUTPATIENT
Start: 2022-07-25 | End: 2022-07-29 | Stop reason: HOSPADM

## 2022-07-25 RX ORDER — MORPHINE SULFATE 4 MG/ML
4 INJECTION, SOLUTION INTRAMUSCULAR; INTRAVENOUS EVERY 4 HOURS PRN
Status: DISCONTINUED | OUTPATIENT
Start: 2022-07-25 | End: 2022-07-28

## 2022-07-25 RX ORDER — CARVEDILOL 6.25 MG/1
3.12 TABLET ORAL 2 TIMES DAILY WITH MEALS
Status: DISCONTINUED | OUTPATIENT
Start: 2022-07-25 | End: 2022-07-29 | Stop reason: HOSPADM

## 2022-07-25 RX ORDER — HEPARIN SODIUM 5000 [USP'U]/ML
5000 INJECTION, SOLUTION INTRAVENOUS; SUBCUTANEOUS EVERY 8 HOURS SCHEDULED
Status: DISCONTINUED | OUTPATIENT
Start: 2022-07-25 | End: 2022-07-29 | Stop reason: HOSPADM

## 2022-07-25 RX ORDER — INSULIN LISPRO 100 [IU]/ML
0-4 INJECTION, SOLUTION INTRAVENOUS; SUBCUTANEOUS
Status: DISCONTINUED | OUTPATIENT
Start: 2022-07-26 | End: 2022-07-29 | Stop reason: HOSPADM

## 2022-07-25 RX ORDER — ALGINATE DRESSING 2" X 2"
1 BANDAGE TOPICAL DAILY
Status: DISCONTINUED | OUTPATIENT
Start: 2022-07-26 | End: 2022-07-25

## 2022-07-25 RX ADMIN — METRONIDAZOLE 500 MG: 500 INJECTION, SOLUTION INTRAVENOUS at 18:37

## 2022-07-25 RX ADMIN — MORPHINE SULFATE 4 MG: 4 INJECTION, SOLUTION INTRAMUSCULAR; INTRAVENOUS at 20:38

## 2022-07-25 RX ADMIN — VANCOMYCIN HYDROCHLORIDE 2000 MG: 1 INJECTION, POWDER, LYOPHILIZED, FOR SOLUTION INTRAVENOUS at 21:08

## 2022-07-25 RX ADMIN — SODIUM CHLORIDE 1000 ML: 9 INJECTION, SOLUTION INTRAVENOUS at 18:39

## 2022-07-25 RX ADMIN — CEFEPIME 2000 MG: 2 INJECTION, POWDER, FOR SOLUTION INTRAVENOUS at 18:38

## 2022-07-25 RX ADMIN — MORPHINE SULFATE 4 MG: 4 INJECTION, SOLUTION INTRAMUSCULAR; INTRAVENOUS at 18:22

## 2022-07-25 ASSESSMENT — PAIN DESCRIPTION - FREQUENCY: FREQUENCY: CONTINUOUS

## 2022-07-25 ASSESSMENT — PAIN - FUNCTIONAL ASSESSMENT: PAIN_FUNCTIONAL_ASSESSMENT: 0-10

## 2022-07-25 ASSESSMENT — PAIN DESCRIPTION - LOCATION: LOCATION: FOOT;TOE (COMMENT WHICH ONE)

## 2022-07-25 ASSESSMENT — PAIN DESCRIPTION - ORIENTATION: ORIENTATION: LEFT;RIGHT

## 2022-07-25 ASSESSMENT — PAIN SCALES - GENERAL: PAINLEVEL_OUTOF10: 7

## 2022-07-25 ASSESSMENT — PAIN DESCRIPTION - PAIN TYPE: TYPE: ACUTE PAIN;CHRONIC PAIN

## 2022-07-25 NOTE — PROGRESS NOTES
[address]  Pharmacy consulted by Don HOPPER for calculation of a one time Vancomycin dose for administration in the ED    Indication for treatment: skin/soft tissue infection    Patient weight 112.5 kg. Vancomycin 2000mg IVPB x 1 dose to be given in ED. KOMAL Fry Ph.

## 2022-07-25 NOTE — ED PROVIDER NOTES
EMERGENCY DEPARTMENT ENCOUNTER    [facility] EMERGENCY DEPARTMENT        TRIAGE CHIEF COMPLAINT:   Wound Check (Wounds to bilateral feet/toes)      [place]:  Florence Turner is a 70 y.o. male that presents for bilateral lower leg/foot wounds. Context is patient is a type II diabetic, insulin-dependent and reports compliance with his medications. Currently following with wound care center for ongoing chronic wounds to the bilateral feet localized to the right great toe and left great toe and third toe. States he was seen at wound care center last week and they encouraged him to come to the emergency department as there was concern for worsening infection possibly needing amputation. Patient follows with local general surgeon, Dr. Dickerson Led Patient has history of severe diabetic neuropathy as well as peripheral artery disease and has had several toes of her right foot already previously amputated. He is otherwise denies any chest pain shortness of breath, fever chills nausea or vomiting or fevers. No proximal ankle calf or knee pain.     ROS:  General:  No fevers, no chills, no weakness  Cardiovascular:  No chest pain, no palpitations  Respiratory:  No shortness of breath, no cough, no wheezing  Gastrointestinal:  No pain, no nausea, no vomiting, no diarrhea  Musculoskeletal:  See HPI  Skin:  See HPI  Neurologic:  No speech problems, no headache, no extremity numbness, no extremity tingling, no extremity weakness  Psychiatric:  No anxiety  Genitourinary:  No dysuria, no hematuria  Extremities:  See HPI    Past Medical History:   Diagnosis Date    Acid reflux     Acute MI ([facility] [address] 2004, 2008    Arthritis     Back    Broken teeth     Upper Front    CAD (coronary artery disease)     Sees Dr. Dean [facility])     per old chart    CHF (congestive heart failure) (HCC)     Chronic back pain     Chronic kidney disease     STAGE 3 KIDNEY FAILURE- \"from my diabetes- do not smoking   Vaping Use    Vaping Use: Never used   Substance and Sexual Activity    Alcohol use: No    Drug use: Yes     Types: Marijuana Smalls [place])    Sexual activity: Never   Other Topics Concern    Not on file   Social History Narrative    Not on file     Social Determinants of Health     Financial Resource Strain: Not on file   Food Insecurity: Not on file   Transportation Needs: Not on file   Physical Activity: Not on file   Stress: Not on file   Social Connections: Not on file   Intimate Partner Violence: Not on file   Housing Stability: Not on file     Current Facility-Administered Medications   Medication Dose Route Frequency Provider Last Rate Last Admin    morphine sulfate (PF) injection 4 mg  4 mg IntraVENous Q30 Min PRN Wilfrid [place], PA-C   4 mg at 07/25/22 1822    0.9 % sodium chloride bolus  1,000 mL IntraVENous Once Wilfrid [place], PA-C 1,000 mL/hr at 07/25/22 1839 1,000 mL at 07/25/22 1839    metronidazole (FLAGYL) 500 mg in 0.9% NaCl 100 mL IVPB premix  500 mg IntraVENous Once Wilfrid [place], PA-C 100 mL/hr at 07/25/22 1837 500 mg at 07/25/22 1837    vancomycin (VANCOCIN) 2,000 mg in dextrose 5 % 500 mL IVPB  2,000 mg IntraVENous Once Wilfrid [place], PA-C         Current Outpatient Medications   Medication Sig Dispense Refill    diclofenac sodium (VOLTAREN) 1 % GEL APPLY 4 G TOPICALLY 2 TIMES DAILY 100 g 7    amLODIPine (NORVASC) 10 MG tablet TAKE 1 TABLET BY MOUTH EVERY DAY 90 tablet 3    lisinopril (PRINIVIL;ZESTRIL) 5 MG tablet Take 1 tablet by mouth daily 30 tablet 0    metOLazone (ZAROXOLYN) 2.5 MG tablet Take 1 tablet by mouth daily 30 tablet 0    torsemide (DEMADEX) 20 MG tablet Take 2 tablets by mouth daily 60 tablet 0    gabapentin (NEURONTIN) 300 MG capsule TAKE 1 CAPSULE BY MOUTH 3 TIMES DAILY FOR 90 DAYS.  90 capsule 3    metaxalone (SKELAXIN) 800 MG tablet Take 800 mg by mouth 3 times daily      SPIRIVA HANDIHALER 18 MCG inhalation capsule       insulin lispro, 1 Unit Dial, 100 erythema edema of all the toes extending onto the foot dorsum. No gross proximal streaking. There is thickened dry scaling skin noted to the bilateral lower shins/lower legs consistent with chronic venous stasis skin changes. No pitting edema or purulent drainage. No other ulcerative wounds noted to the proximal foot dorsum, ankle or calf. Poor capillary refill to the remaining toes. Difficult to palpate pedal pulses secondary to overlying edema. Integument: Skin findings as noted above  Neurologic:  Alert & oriented x3 , No focal deficits noted. Cranial nerves II through XII grossly intact. No slurred speech. No facial droop.     Psychiatric:  Affect appropriate      I have reviewed and interpreted all of the currently available lab results from this visit (if applicable):  Results for orders placed or performed during the hospital encounter of 07/25/22   CBC with Auto Differential   Result Value Ref Range    WBC 6.0 4.0 - 10.5 K/CU MM    RBC 3.58 (L) 4.6 - 6.2 M/CU MM    Hemoglobin 10.4 (L) 13.5 - 18.0 GM/DL    Hematocrit 32.8 (L) 42 - 52 %    MCV 91.6 78 - 100 FL    MCH 29.1 27 - 31 PG    MCHC 31.7 (L) 32.0 - 36.0 %    RDW 14.4 11.7 - 14.9 %    Platelets 431 785 - 237 K/CU MM    MPV 9.7 7.5 - 11.1 FL    Differential Type AUTOMATED DIFFERENTIAL     Segs Relative 67.6 (H) 36 - 66 %    Lymphocytes % 24.6 24 - 44 %    Monocytes % 5.1 (H) 0 - 4 %    Eosinophils % 2.2 0 - 3 %    Basophils % 0.3 0 - 1 %    Segs Absolute 4.1 K/CU MM    Lymphocytes Absolute 1.5 K/CU MM    Monocytes Absolute 0.3 K/CU MM    Eosinophils Absolute 0.1 K/CU MM    Basophils Absolute 0.0 K/CU MM    Nucleated RBC % 0.0 %    Total Nucleated RBC 0.0 K/CU MM    Total Immature Neutrophil 0.01 K/CU MM    Immature Neutrophil % 0.2 0 - 0.43 %   Comprehensive Metabolic Panel   Result Value Ref Range    Sodium 140 135 - 145 MMOL/L    Potassium 4.1 3.5 - 5.1 MMOL/L    Chloride 96 (L) 99 - 110 mMol/L    CO2 35 (H) 21 - 32 MMOL/L    BUN 26 (H) 6 - osteoporotic. 4. Diffuse nonspecific edema about the foot may reflect cellulitis. Narrative:    EXAMINATION:   THREE XRAY VIEWS OF THE LEFT FOOT     7/25/2022 6:26 pm     COMPARISON:   Left foot radiograph series 10/28/2021     HISTORY:   ORDERING SYSTEM PROVIDED HISTORY: diabetic wound 1st/3rd toe   TECHNOLOGIST PROVIDED HISTORY:   Reason for exam:->diabetic wound 1st/3rd toe   Reason for Exam: 1+3 toes ulcer     FINDINGS:   *There appears to be extensive bone erosion at the head of the middle phalanx   left 2nd toe versus prior surgical intervention, new compared to prior study   concerning for osteomyelitis. *No other definite radiographic features of osteomyelitis demonstrated. *Bones are osteoporotic. *Diffuse nonspecific edema about the foot may reflect cellulitis. *No subcuticular gas formation is evident. XR FOOT RIGHT (MIN 3 VIEWS) (Final result)  Result time 07/25/22 18:59:40  Final result by Chente Arguelles MD (07/25/22 18:59:40)                Impression:    1. No acute osseous abnormality evident. No definite findings for   osteomyelitis. 2. Nonspecific edema about the foot may reflect cellulitis. 3. Bones appear osteoporotic. Follow-up imaging recommended if pain persists or worsens following   conservative management. Narrative:    EXAMINATION:   THREE XRAY VIEWS OF THE RIGHT FOOT     7/25/2022 6:26 pm     COMPARISON:   Right foot radiograph series 07/21/2022     HISTORY:   ORDERING SYSTEM PROVIDED HISTORY: great toe ulcer     FINDINGS:   Bone density osseous structures of the right foot is diminished. Sequela   from prior resection at 3rd and 5th toes. Osseous structures of the foot   appear intact and are aligned normally. Joint spaces appear well maintained. Soft tissues are diffusely edematous. No retained radiopaque foreign body. Chart review shows recent radiographs:  No results found.     ED COURSE & MEDICAL DECISION MAKING       Vital signs and nursing notes reviewed during ED course. I have independently evaluated this patient . Supervising physician - Dr. Monserrat Jordan - was present in ED and available for consultation throughout entirety of patient's care. All pertinent Lab data and radiographic results reviewed with patient at bedside. The patient and/or the family were informed of the results of any tests/labs/imaging, the treatment plan, and time was allotted to answer questions. Clinical Impression:  1. Diabetic ulcer of toe associated with type 2 diabetes mellitus, with bone involvement without evidence of necrosis, unspecified laterality ([address]    2. Toe osteomyelitis (HCC)    3. Cellulitis of foot        Patient presents for bilateral lower foot/toe wounds with history of insulin-dependent diabetes. Work-up was initiated triage. On exam, pleasant nontoxic 61-year-old male, elevated BMI, no acute respiratory distress. Currently afebrile, vital stable. Unremarkable cardiopulmonary exam.  Abdomen soft nontender. Wound dressings removed at bedside that do reveal foul-smelling discharge from ulcerative wounds involving the right and left great toe as well as the left second toe. There is noted warmth without streaking to the foot dorsum as well as swelling throughout the toes of the left foot. Noted chronic venous stasis skin changes, dry and scaling to the bilateral lower leg/calf region. Very poor capillary refill and difficult to palpate pedal pulses to the lower legs with known history of peripheral artery disease. Patient placed on telemetry and continuous pulse ox monitoring. Start him on IV fluids, morphine as well as empiric antibiotics for concern of diabetic foot ulcer with vancomycin, cefepime and Flagyl. Did order wound and blood cultures. CBC with normal white count, hemoglobin is 10.4, up trended compared to previous. CMP shows CO2 of 35, similar to baseline.   BUN and creatinine is also stable to baseline at 29/1.9. Normal anion gap and potassium. Lactic within normal range. X-ray of left foot shows extensive bony erosion at the head of the middle phalanx left second toe concerning for osteomyelitis with additional nonspecific edema about the foot concerning for cellulitis. Right foot x-ray shows no evidence of osteomyelitis but also nonspecific edema to the foot concerning for cellulitis. I did consult with Dr. Jayla Mosley - general surgery - and discussed patient's history, ED presentation/course including any pertinent laboratory findings and imaging study findings. He/she recommends admission, continued IV antibiotics and n.p.o. status after midnight for anticipated surgical intervention of left second toe tomorrow. Plan for admission discussed with patient who verbalizes understanding and agreement. I then spoke with hospitalist Dr. Jovany Morton who aggrees to hospital admission. Patient is admitted to the hospital in stable condition. In consideration of current COVID19 pandemic, with effort to minimize unnecessary provider exposure, this patient was seen at bedside by me independently. However, in compliance with current hospital WAGNER/ED protocol, prior to admission I did discuss this patient case with emergency department physician, Dr. Randa Fleischer, who did agree with ED workup/evaluation and plan for admission however but ED attending physician did not independently evaluate the patient. Of note, this Pt was NOT admitted to the ICU. Diagnosis and plan discussed in detail with patient who understands and agrees. Disposition referral (if applicable):  No follow-up provider specified.     Disposition medications (if applicable):  New Prescriptions    No medications on file         (Please note that portions of this note may have been completed with a voice recognition program. Efforts were made to edit the dictations but occasionally words are mis-transcribed.)         Herminia Babin PA-C  07/25/22 1925

## 2022-07-26 ENCOUNTER — ANESTHESIA EVENT (OUTPATIENT)
Dept: OPERATING ROOM | Age: 72
DRG: 617 | End: 2022-07-26
Payer: MEDICARE

## 2022-07-26 ENCOUNTER — ANESTHESIA (OUTPATIENT)
Dept: OPERATING ROOM | Age: 72
DRG: 617 | End: 2022-07-26
Payer: MEDICARE

## 2022-07-26 PROBLEM — L97.506 DIABETIC ULCER OF TOE ASSOCIATED WITH TYPE 2 DIABETES MELLITUS, WITH BONE INVOLVEMENT WITHOUT EVIDENCE OF NECROSIS (HCC): Status: ACTIVE | Noted: 2021-06-18

## 2022-07-26 PROBLEM — M86.9 TOE OSTEOMYELITIS (HCC): Status: ACTIVE | Noted: 2022-07-26

## 2022-07-26 PROBLEM — L03.119 CELLULITIS OF FOOT: Status: ACTIVE | Noted: 2022-01-01

## 2022-07-26 LAB
ERYTHROCYTE SEDIMENTATION RATE: 59 MM/HR (ref 0–20)
ESTIMATED AVERAGE GLUCOSE: 174 MG/DL
GLUCOSE BLD-MCNC: 166 MG/DL (ref 70–99)
GLUCOSE BLD-MCNC: 167 MG/DL (ref 70–99)
GLUCOSE BLD-MCNC: 167 MG/DL (ref 70–99)
GLUCOSE BLD-MCNC: 171 MG/DL (ref 70–99)
GLUCOSE BLD-MCNC: 172 MG/DL (ref 70–99)
GLUCOSE BLD-MCNC: 179 MG/DL (ref 70–99)
GLUCOSE BLD-MCNC: 243 MG/DL (ref 70–99)
HBA1C MFR BLD: 7.7 % (ref 4.2–6.3)
HCT VFR BLD CALC: 33.1 % (ref 42–52)
HEMOGLOBIN: 9.9 GM/DL (ref 13.5–18)

## 2022-07-26 PROCEDURE — 6370000000 HC RX 637 (ALT 250 FOR IP): Performed by: INTERNAL MEDICINE

## 2022-07-26 PROCEDURE — 85014 HEMATOCRIT: CPT

## 2022-07-26 PROCEDURE — 6360000002 HC RX W HCPCS: Performed by: PHYSICIAN ASSISTANT

## 2022-07-26 PROCEDURE — 3700000000 HC ANESTHESIA ATTENDED CARE: Performed by: SURGERY

## 2022-07-26 PROCEDURE — 2500000003 HC RX 250 WO HCPCS: Performed by: INTERNAL MEDICINE

## 2022-07-26 PROCEDURE — 2500000003 HC RX 250 WO HCPCS

## 2022-07-26 PROCEDURE — 6370000000 HC RX 637 (ALT 250 FOR IP): Performed by: PHYSICIAN ASSISTANT

## 2022-07-26 PROCEDURE — 0Y6S0Z0 DETACHMENT AT LEFT 2ND TOE, COMPLETE, OPEN APPROACH: ICD-10-PCS | Performed by: SURGERY

## 2022-07-26 PROCEDURE — 2580000003 HC RX 258: Performed by: PHYSICIAN ASSISTANT

## 2022-07-26 PROCEDURE — APPSS60 APP SPLIT SHARED TIME 46-60 MINUTES: Performed by: NURSE PRACTITIONER

## 2022-07-26 PROCEDURE — 85652 RBC SED RATE AUTOMATED: CPT

## 2022-07-26 PROCEDURE — 6360000002 HC RX W HCPCS: Performed by: INTERNAL MEDICINE

## 2022-07-26 PROCEDURE — 2709999900 HC NON-CHARGEABLE SUPPLY: Performed by: SURGERY

## 2022-07-26 PROCEDURE — 36415 COLL VENOUS BLD VENIPUNCTURE: CPT

## 2022-07-26 PROCEDURE — 2500000003 HC RX 250 WO HCPCS: Performed by: PHYSICIAN ASSISTANT

## 2022-07-26 PROCEDURE — 99223 1ST HOSP IP/OBS HIGH 75: CPT | Performed by: SURGERY

## 2022-07-26 PROCEDURE — 3600000002 HC SURGERY LEVEL 2 BASE: Performed by: SURGERY

## 2022-07-26 PROCEDURE — 88311 DECALCIFY TISSUE: CPT | Performed by: PATHOLOGY

## 2022-07-26 PROCEDURE — 2140000000 HC CCU INTERMEDIATE R&B

## 2022-07-26 PROCEDURE — 6360000002 HC RX W HCPCS

## 2022-07-26 PROCEDURE — 7100000001 HC PACU RECOVERY - ADDTL 15 MIN: Performed by: SURGERY

## 2022-07-26 PROCEDURE — 2580000003 HC RX 258: Performed by: INTERNAL MEDICINE

## 2022-07-26 PROCEDURE — 2580000003 HC RX 258

## 2022-07-26 PROCEDURE — 82962 GLUCOSE BLOOD TEST: CPT

## 2022-07-26 PROCEDURE — 85018 HEMOGLOBIN: CPT

## 2022-07-26 PROCEDURE — 28820 AMPUTATION OF TOE: CPT | Performed by: SURGERY

## 2022-07-26 PROCEDURE — 99223 1ST HOSP IP/OBS HIGH 75: CPT | Performed by: INTERNAL MEDICINE

## 2022-07-26 PROCEDURE — APPNB45 APP NON BILLABLE 31-45 MINUTES: Performed by: PHYSICIAN ASSISTANT

## 2022-07-26 PROCEDURE — 3600000012 HC SURGERY LEVEL 2 ADDTL 15MIN: Performed by: SURGERY

## 2022-07-26 PROCEDURE — 3700000001 HC ADD 15 MINUTES (ANESTHESIA): Performed by: SURGERY

## 2022-07-26 PROCEDURE — 88305 TISSUE EXAM BY PATHOLOGIST: CPT | Performed by: PATHOLOGY

## 2022-07-26 PROCEDURE — 7100000000 HC PACU RECOVERY - FIRST 15 MIN: Performed by: SURGERY

## 2022-07-26 RX ORDER — FENTANYL CITRATE 50 UG/ML
50 INJECTION, SOLUTION INTRAMUSCULAR; INTRAVENOUS EVERY 5 MIN PRN
Status: DISCONTINUED | OUTPATIENT
Start: 2022-07-26 | End: 2022-07-26

## 2022-07-26 RX ORDER — PROCHLORPERAZINE EDISYLATE 5 MG/ML
5 INJECTION INTRAMUSCULAR; INTRAVENOUS
Status: DISCONTINUED | OUTPATIENT
Start: 2022-07-26 | End: 2022-07-26

## 2022-07-26 RX ORDER — SODIUM CHLORIDE, SODIUM LACTATE, POTASSIUM CHLORIDE, CALCIUM CHLORIDE 600; 310; 30; 20 MG/100ML; MG/100ML; MG/100ML; MG/100ML
INJECTION, SOLUTION INTRAVENOUS CONTINUOUS
Status: DISCONTINUED | OUTPATIENT
Start: 2022-07-26 | End: 2022-07-26

## 2022-07-26 RX ORDER — SODIUM CHLORIDE 9 MG/ML
INJECTION, SOLUTION INTRAVENOUS CONTINUOUS PRN
Status: DISCONTINUED | OUTPATIENT
Start: 2022-07-26 | End: 2022-07-26 | Stop reason: SDUPTHER

## 2022-07-26 RX ORDER — SODIUM CHLORIDE 9 MG/ML
25 INJECTION, SOLUTION INTRAVENOUS PRN
Status: DISCONTINUED | OUTPATIENT
Start: 2022-07-26 | End: 2022-07-26

## 2022-07-26 RX ORDER — SODIUM CHLORIDE 0.9 % (FLUSH) 0.9 %
5-40 SYRINGE (ML) INJECTION PRN
Status: DISCONTINUED | OUTPATIENT
Start: 2022-07-26 | End: 2022-07-26

## 2022-07-26 RX ORDER — OXYCODONE HYDROCHLORIDE 5 MG/1
5 TABLET ORAL
Status: DISCONTINUED | OUTPATIENT
Start: 2022-07-26 | End: 2022-07-26

## 2022-07-26 RX ORDER — HYDRALAZINE HYDROCHLORIDE 20 MG/ML
10 INJECTION INTRAMUSCULAR; INTRAVENOUS
Status: DISCONTINUED | OUTPATIENT
Start: 2022-07-26 | End: 2022-07-26

## 2022-07-26 RX ORDER — FENTANYL CITRATE 50 UG/ML
INJECTION, SOLUTION INTRAMUSCULAR; INTRAVENOUS PRN
Status: DISCONTINUED | OUTPATIENT
Start: 2022-07-26 | End: 2022-07-26 | Stop reason: SDUPTHER

## 2022-07-26 RX ORDER — ONDANSETRON 2 MG/ML
INJECTION INTRAMUSCULAR; INTRAVENOUS PRN
Status: DISCONTINUED | OUTPATIENT
Start: 2022-07-26 | End: 2022-07-26 | Stop reason: SDUPTHER

## 2022-07-26 RX ORDER — ONDANSETRON 2 MG/ML
4 INJECTION INTRAMUSCULAR; INTRAVENOUS
Status: DISCONTINUED | OUTPATIENT
Start: 2022-07-26 | End: 2022-07-26

## 2022-07-26 RX ORDER — DEXTROSE MONOHYDRATE 100 MG/ML
INJECTION, SOLUTION INTRAVENOUS CONTINUOUS PRN
Status: DISCONTINUED | OUTPATIENT
Start: 2022-07-26 | End: 2022-07-26

## 2022-07-26 RX ORDER — LORAZEPAM 2 MG/ML
0.5 INJECTION INTRAMUSCULAR
Status: DISCONTINUED | OUTPATIENT
Start: 2022-07-26 | End: 2022-07-26

## 2022-07-26 RX ORDER — PROPOFOL 10 MG/ML
INJECTION, EMULSION INTRAVENOUS PRN
Status: DISCONTINUED | OUTPATIENT
Start: 2022-07-26 | End: 2022-07-26 | Stop reason: SDUPTHER

## 2022-07-26 RX ORDER — SODIUM CHLORIDE 0.9 % (FLUSH) 0.9 %
5-40 SYRINGE (ML) INJECTION EVERY 12 HOURS SCHEDULED
Status: DISCONTINUED | OUTPATIENT
Start: 2022-07-26 | End: 2022-07-26

## 2022-07-26 RX ORDER — IPRATROPIUM BROMIDE AND ALBUTEROL SULFATE 2.5; .5 MG/3ML; MG/3ML
1 SOLUTION RESPIRATORY (INHALATION)
Status: DISCONTINUED | OUTPATIENT
Start: 2022-07-26 | End: 2022-07-26

## 2022-07-26 RX ORDER — DIPHENHYDRAMINE HYDROCHLORIDE 50 MG/ML
12.5 INJECTION INTRAMUSCULAR; INTRAVENOUS
Status: DISCONTINUED | OUTPATIENT
Start: 2022-07-26 | End: 2022-07-26

## 2022-07-26 RX ORDER — MEPERIDINE HYDROCHLORIDE 25 MG/ML
12.5 INJECTION INTRAMUSCULAR; INTRAVENOUS; SUBCUTANEOUS ONCE
Status: DISCONTINUED | OUTPATIENT
Start: 2022-07-26 | End: 2022-07-26

## 2022-07-26 RX ORDER — LIDOCAINE HYDROCHLORIDE 20 MG/ML
INJECTION, SOLUTION EPIDURAL; INFILTRATION; INTRACAUDAL; PERINEURAL PRN
Status: DISCONTINUED | OUTPATIENT
Start: 2022-07-26 | End: 2022-07-26 | Stop reason: SDUPTHER

## 2022-07-26 RX ADMIN — ONDANSETRON 4 MG: 2 INJECTION INTRAMUSCULAR; INTRAVENOUS at 21:00

## 2022-07-26 RX ADMIN — MORPHINE SULFATE 4 MG: 4 INJECTION, SOLUTION INTRAMUSCULAR; INTRAVENOUS at 05:10

## 2022-07-26 RX ADMIN — HEPARIN SODIUM 5000 UNITS: 5000 INJECTION INTRAVENOUS; SUBCUTANEOUS at 00:00

## 2022-07-26 RX ADMIN — MORPHINE SULFATE 4 MG: 4 INJECTION, SOLUTION INTRAMUSCULAR; INTRAVENOUS at 11:55

## 2022-07-26 RX ADMIN — METRONIDAZOLE 500 MG: 500 INJECTION, SOLUTION INTRAVENOUS at 00:04

## 2022-07-26 RX ADMIN — METRONIDAZOLE 500 MG: 500 INJECTION, SOLUTION INTRAVENOUS at 05:21

## 2022-07-26 RX ADMIN — MORPHINE SULFATE 4 MG: 4 INJECTION, SOLUTION INTRAMUSCULAR; INTRAVENOUS at 21:01

## 2022-07-26 RX ADMIN — PROPOFOL 200 MG: 10 INJECTION, EMULSION INTRAVENOUS at 10:00

## 2022-07-26 RX ADMIN — ATORVASTATIN CALCIUM 40 MG: 40 TABLET, FILM COATED ORAL at 00:00

## 2022-07-26 RX ADMIN — SODIUM CHLORIDE: 9 INJECTION, SOLUTION INTRAVENOUS at 09:51

## 2022-07-26 RX ADMIN — SODIUM CHLORIDE 25 ML: 9 INJECTION, SOLUTION INTRAVENOUS at 09:28

## 2022-07-26 RX ADMIN — VANCOMYCIN HYDROCHLORIDE 1000 MG: 1 INJECTION, POWDER, LYOPHILIZED, FOR SOLUTION INTRAVENOUS at 21:08

## 2022-07-26 RX ADMIN — METRONIDAZOLE 500 MG: 500 INJECTION, SOLUTION INTRAVENOUS at 18:05

## 2022-07-26 RX ADMIN — METRONIDAZOLE 500 MG: 500 INJECTION, SOLUTION INTRAVENOUS at 12:00

## 2022-07-26 RX ADMIN — CARVEDILOL 3.12 MG: 6.25 TABLET, FILM COATED ORAL at 00:00

## 2022-07-26 RX ADMIN — ONDANSETRON 4 MG: 2 INJECTION INTRAMUSCULAR; INTRAVENOUS at 10:22

## 2022-07-26 RX ADMIN — HEPARIN SODIUM 5000 UNITS: 5000 INJECTION INTRAVENOUS; SUBCUTANEOUS at 05:11

## 2022-07-26 RX ADMIN — CEFEPIME HYDROCHLORIDE 2000 MG: 2 INJECTION, POWDER, FOR SOLUTION INTRAVENOUS at 18:08

## 2022-07-26 RX ADMIN — MORPHINE SULFATE 4 MG: 4 INJECTION, SOLUTION INTRAMUSCULAR; INTRAVENOUS at 00:43

## 2022-07-26 RX ADMIN — SODIUM CHLORIDE, PRESERVATIVE FREE 10 ML: 5 INJECTION INTRAVENOUS at 08:20

## 2022-07-26 RX ADMIN — AMLODIPINE BESYLATE 10 MG: 10 TABLET ORAL at 08:20

## 2022-07-26 RX ADMIN — ATORVASTATIN CALCIUM 40 MG: 40 TABLET, FILM COATED ORAL at 21:01

## 2022-07-26 RX ADMIN — LIDOCAINE HYDROCHLORIDE 100 MG: 20 INJECTION, SOLUTION EPIDURAL; INFILTRATION; INTRACAUDAL; PERINEURAL at 10:00

## 2022-07-26 RX ADMIN — CARVEDILOL 3.12 MG: 6.25 TABLET, FILM COATED ORAL at 17:58

## 2022-07-26 RX ADMIN — CEFEPIME HYDROCHLORIDE 2000 MG: 2 INJECTION, POWDER, FOR SOLUTION INTRAVENOUS at 05:19

## 2022-07-26 RX ADMIN — FENTANYL CITRATE 50 MCG: 50 INJECTION, SOLUTION INTRAMUSCULAR; INTRAVENOUS at 10:00

## 2022-07-26 RX ADMIN — CARVEDILOL 3.12 MG: 6.25 TABLET, FILM COATED ORAL at 08:20

## 2022-07-26 ASSESSMENT — PAIN DESCRIPTION - LOCATION
LOCATION: TOE (COMMENT WHICH ONE)
LOCATION: FOOT

## 2022-07-26 ASSESSMENT — PAIN DESCRIPTION - ORIENTATION
ORIENTATION: RIGHT
ORIENTATION: RIGHT;LEFT
ORIENTATION: RIGHT

## 2022-07-26 ASSESSMENT — PAIN DESCRIPTION - DESCRIPTORS
DESCRIPTORS: ACHING
DESCRIPTORS: CRAMPING
DESCRIPTORS: BURNING;ACHING
DESCRIPTORS: ACHING
DESCRIPTORS: ACHING

## 2022-07-26 ASSESSMENT — PAIN DESCRIPTION - ONSET
ONSET: ON-GOING

## 2022-07-26 ASSESSMENT — PAIN DESCRIPTION - FREQUENCY
FREQUENCY: CONTINUOUS

## 2022-07-26 ASSESSMENT — PAIN SCALES - GENERAL
PAINLEVEL_OUTOF10: 7
PAINLEVEL_OUTOF10: 7
PAINLEVEL_OUTOF10: 0
PAINLEVEL_OUTOF10: 4
PAINLEVEL_OUTOF10: 8
PAINLEVEL_OUTOF10: 0
PAINLEVEL_OUTOF10: 10
PAINLEVEL_OUTOF10: 6
PAINLEVEL_OUTOF10: 8

## 2022-07-26 ASSESSMENT — PAIN DESCRIPTION - PAIN TYPE
TYPE: ACUTE PAIN;CHRONIC PAIN
TYPE: ACUTE PAIN;CHRONIC PAIN
TYPE: CHRONIC PAIN

## 2022-07-26 ASSESSMENT — PAIN - FUNCTIONAL ASSESSMENT
PAIN_FUNCTIONAL_ASSESSMENT: ACTIVITIES ARE NOT PREVENTED
PAIN_FUNCTIONAL_ASSESSMENT: PREVENTS OR INTERFERES SOME ACTIVE ACTIVITIES AND ADLS
PAIN_FUNCTIONAL_ASSESSMENT: PREVENTS OR INTERFERES WITH MANY ACTIVE NOT PASSIVE ACTIVITIES
PAIN_FUNCTIONAL_ASSESSMENT: INTOLERABLE, UNABLE TO DO ANY ACTIVE OR PASSIVE ACTIVITIES

## 2022-07-26 NOTE — PROGRESS NOTES
V2.0  [facility] Hospitalist Progress Note      Name:  Tegan Adamson /Age/Sex: 1950  (70 y.o. male)   MRN & CSN:  [id number] & [id number] Encounter Date/Time: 2022 1:57 PM EDT    Location:  [address] [place] [address]-A PCP: Zeferino Villanueva Day: 2    Assessment and Plan:   Tegan Adamson is a 70 y.o. male who presents with left second toe ulcer with cellulitis      Plan:  Right foot cellulitis and left second toe diabetic foot ulcer with cellulitis and osteomyelitis s/p left second toe amputation -left foot x-ray : Extensive bony erosion at head of middle phalanx left second toe; diffuse nonspecific edema about foot. General surgery following. On vancomycin, Flagyl, and cefepime. CKD 3  DM2-on sliding scale insulin. A1c 7.7. HTN  SSS s/p pacer  PVD    Diet ADULT DIET; Regular; 4 carb choices (60 gm/meal)    DVT Prophylaxis [] Lovenox, []  Heparin, [] SCDs, [] Ambulation,  [] Eliquis, [] Xarelto  [] Coumadin   Code Status Full Code   Disposition From: home  Expected Disposition: SNF  Estimated Date of Discharge:   Patient requires continued admission due to celluitis and osteomyelitis   Home O2 2LNC at night     Subjective/Interval history:   Chief Complaint: Wound Check (Wounds to bilateral feet/toes)     States he was dealing with his left foot wound for 6 months and has been worsening. Pain was too unbearable. Patient states he is on [facility] at night at home. Review of Systems:    Negative unless mentioned above    Objective:      Intake/Output Summary (Last 24 hours) at 2022 1357  Last data filed at 2022 1313  Gross per 24 hour   Intake 720 ml   Output 1115 ml   Net -395 ml        Vitals:   BP (!) 147/59   Pulse 60   Temp 97.9 °F (36.6 °C) (Temporal)   Resp 12   Wt 235 lb 14.3 oz (107 kg)   SpO2 96%   BMI 31.99 kg/m²     Physical Exam:   General: NAD, laying in b  Eyes: no discharge  HENT: NCAT  Cardiovascular: RRR  Respiratory: Clear to auscultation b/l bs+, on 2LNC  Gastrointestinal: Soft, non tender, nondistended  Genitourinary: no suprapubic tenderness  Musculoskeletal: legs are edematous, left foot is wrapped  Skin: appears to also have small wound on right foot  Neuro: Alert. No gross deficits  Psych: Mood appropriate. Medications:   Medications:    clopidogrel  75 mg Oral Daily    atorvastatin  40 mg Oral Nightly    carvedilol  3.125 mg Oral BID WC    metaxalone  800 mg Oral TID    amLODIPine  10 mg Oral Daily    tiotropium  2 puff Inhalation Daily    sodium chloride flush  5-40 mL IntraVENous 2 times per day    heparin (porcine)  5,000 Units SubCUTAneous 3 times per day    metroNIDAZOLE  500 mg IntraVENous Q6H    cefepime  2,000 mg IntraVENous Q12H    vancomycin  1,000 mg IntraVENous Q24H    insulin lispro  0-4 Units SubCUTAneous TID WC    insulin lispro  0-4 Units SubCUTAneous Nightly      Infusions:    sodium chloride 25 mL (07/26/22 0928)    dextrose       PRN Meds: albuterol sulfate HFA, 2 puff, Q4H PRN  sodium chloride flush, 5-40 mL, PRN  sodium chloride, 25 mL, PRN  ondansetron, 4 mg, Q8H PRN   Or  ondansetron, 4 mg, Q6H PRN  polyethylene glycol, 17 g, Daily PRN  acetaminophen, 650 mg, Q6H PRN   Or  acetaminophen, 650 mg, Q6H PRN  morphine, 4 mg, Q4H PRN  glucose, 4 tablet, PRN  dextrose bolus, 125 mL, PRN   Or  dextrose bolus, 250 mL, PRN  glucagon (rDNA), 1 mg, PRN  dextrose, , Continuous PRN  hydrALAZINE, 10 mg, Q4H PRN        Labs      Recent Labs     07/25/22  1808   WBC 6.0   HGB 10.4*   HCT 32.8*         Recent Labs     07/25/22  1808      K 4.1   CL 96*   CO2 35*   BUN 26*   CREATININE 1.9*     Recent Labs     07/25/22  1808   AST 9*   ALT 6*   BILITOT 0.3   ALKPHOS 105     No results for input(s): INR in the last 72 hours. No results for input(s): CKTOTAL, CKMB, CKMBINDEX, TROPONINT in the last 72 hours.   Lab Results   Component Value Date/Time    LABA1C 7.7 07/25/2022 08:08 PM     CALCIUM:  8.6/35 (07/25 8668)  Lab Results Component Value Date/Time    MG 1.9 07/14/2022 12:01 PM           Electronically signed by Yo Meza MD on 7/26/2022 at 1:57 PM

## 2022-07-26 NOTE — BRIEF OP NOTE
Brief Postoperative Note      Patient: Timmy Walden  YOB: 1950  MRN: [id number]    Date of Procedure: 7/26/2022    Pre-Op Diagnosis: Infected abrasion of toe of left foot, initial encounter [S90.415A, L08.9]    Post-Op Diagnosis: Same       Procedure(s):  LEFT SECOND TOE AMPUTATION    Surgeon(s):  Viri Yuen MD    Assistant:  First Assistant: Vickie Long PA-C    Anesthesia: General    Estimated Blood Loss (mL): Minimal    Complications: None    Specimens:   ID Type Source Tests Collected by Time Destination   A : Left second toe  Specimen Toe SURGICAL PATHOLOGY Viri Yuen MD 7/26/2022 1002        Implants:  * No implants in log *      Drains: * No LDAs found *    Findings: PVD    Electronically signed by Vickie Long PA-C on 7/26/2022 at 10:47 AM

## 2022-07-26 NOTE — PROGRESS NOTES
Patients left foot noticed to be in a pool of blood, per patient this occurred when he sat up at the bedside. Patient prompted to lay back down and elevate left foot. WARD Perez Kos with surgery instructed this RN to replace dressing with xeroform, abd pad, and wrap with kerlix, and a loosely wrapped ace wrap. This RN inquired about patients heparin injection and if I should hold it. Dino HOPPER instructed this RN that it is ok to hold heparin today.  Information passed on to the hospitalist, Dr Hasmukh Katz lmp 10/15/19, vaginal spotting at present

## 2022-07-26 NOTE — ANESTHESIA PRE PROCEDURE
Department of Anesthesiology  Preprocedure Note       Name:  Florence Turner   Age:  70 y.o.  :  1950                                          MRN:  [id number]         Date:  2022      Surgeon: Toby Tena):  Yoan Manrique MD    Procedure: Procedure(s):  LEFT SECOND TOE AMPUTATION    Medications prior to admission:   Prior to Admission medications    Medication Sig Start Date End Date Taking? Authorizing Provider   diclofenac sodium (VOLTAREN) 1 % GEL APPLY 4 G TOPICALLY 2 TIMES DAILY 22   Wes King MD   amLODIPine (NORVASC) 10 MG tablet TAKE 1 TABLET BY MOUTH EVERY DAY 22   Wes King MD   lisinopril (PRINIVIL;ZESTRIL) 5 MG tablet Take 1 tablet by mouth daily 2/3/22 7/21/22  Wing Pagan MD   metOLazone (ZAROXOLYN) 2.5 MG tablet Take 1 tablet by mouth daily 2/3/22 7/21/22  Wing Pagan MD   torsemide (DEMADEX) 20 MG tablet Take 2 tablets by mouth daily 2/3/22 7/21/22  Wing Pagan MD   gabapentin (NEURONTIN) 300 MG capsule TAKE 1 CAPSULE BY MOUTH 3 TIMES DAILY FOR 90 DAYS.  11/15/21 7/21/22  Blake Berrios PA-C   metaxalone (SKELAXIN) 800 MG tablet Take 800 mg by mouth 3 times daily    Historical Provider, MD Decker Divers 18 MCG inhalation capsule  21   Historical Provider, MD   insulin lispro, 1 Unit Dial, 100 UNIT/ML SOPN Inject into the skin 2 times daily Sliding scale dose    Historical Provider, MD   atorvastatin (LIPITOR) 40 MG tablet Take 1 tablet by mouth nightly 20   Armando Byrd MD   carvedilol (COREG) 3.125 MG tablet Take 1 tablet by mouth 2 times daily 20   Armando Byrd MD   albuterol sulfate HFA (VENTOLIN HFA) 108 (90 Base) MCG/ACT inhaler Inhale 2 puffs into the lungs every 4 hours as needed for Wheezing 20   Beth Garcia MD   Calcium Alginate (ALGICELL CALCIUM DRESSING 4\"X8) MISC Apply 1 Device topically daily 3/20/20   Yoan Manrique MD   [facility] COREY WOUND DRESSING MATRIX Apply topically Apply to left daily and cover with gauze 3/19/20   Bharath Weber MD   clopidogrel (PLAVIX) 75 MG tablet Take 1 tablet by mouth daily 7/18/17   Lovely Nino MD       Current medications:    Current Facility-Administered Medications   Medication Dose Route Frequency Provider Last Rate Last Admin    clopidogrel (PLAVIX) tablet 75 mg  75 mg Oral Daily Luislynsey Ribeiro MD        albuterol sulfate HFA (PROVENTIL;VENTOLIN;PROAIR) 108 (90 Base) MCG/ACT inhaler 2 puff  2 puff Inhalation Q4H PRN Luis Mary Jo Ribeiro MD        atorvastatin (LIPITOR) tablet 40 mg  40 mg Oral Nightly Luis Mary Jo Ribeiro MD   40 mg at 07/26/22 0000    carvedilol (COREG) tablet 3.125 mg  3.125 mg Oral BID WC Luislynsey Ribeiro MD   3.125 mg at 07/26/22 0000    metaxalone (SKELAXIN) tablet 800 mg (Patient Supplied)  800 mg Oral TID Luis Mary Jo Ribeiro MD        amLODIPine (NORVASC) tablet 10 mg  10 mg Oral Daily Luis Mary Jo Ribeiro MD        tiotropium (SPIRIVA RESPIMAT) 2.5 MCG/ACT inhaler 2 puff  2 puff Inhalation Daily Luislynsey Ribeiro MD        sodium chloride flush 0.9 % injection 5-40 mL  5-40 mL IntraVENous 2 times per day Karli Chavez MD        sodium chloride flush 0.9 % injection 5-40 mL  5-40 mL IntraVENous PRN Luislynsey Ribeiro MD        0.9 % sodium chloride infusion  25 mL IntraVENous PRN Luis Mary Jo Ribeiro MD        ondansetron (ZOFRAN-ODT) disintegrating tablet 4 mg  4 mg Oral Q8H PRN Luis Mary Jo Ribeiro MD        Or    ondansetron (ZOFRAN) injection 4 mg  4 mg IntraVENous Q6H PRN Luislynsey Ribeiro MD        polyethylene glycol (GLYCOLAX) packet 17 g  17 g Oral Daily PRN Luis Mary Jo Ribeiro MD        acetaminophen (TYLENOL) tablet 650 mg  650 mg Oral Q6H PRN Luis Mary Jo Ribeiro MD        Or    acetaminophen (TYLENOL) suppository 650 mg  650 mg Rectal Q6H PRN Luis Mary Jo Ribeiro MD        heparin (porcine) injection 5,000 Units  5,000 Units SubCUTAneous 3 times per day Karli Chavez MD   5,000 Units at 07/26/22 0511    metronidazole (FLAGYL) 500 mg in 0.9% NaCl 100 mL IVPB premix  500 mg IntraVENous Q6H Luis Griffin MD   Stopped at 07/26/22 0544    morphine sulfate (PF) injection 4 mg  4 mg IntraVENous Q4H PRN Luis Griffin MD   4 mg at 07/26/22 0510    cefepime (MAXIPIME) 2000 mg IVPB minibag  2,000 mg IntraVENous Q12H Luis Griffin MD   Stopped at 07/26/22 0547    vancomycin (VANCOCIN) 1,000 mg in dextrose 5 % 250 mL IVPB (Vial5Adp)  1,000 mg IntraVENous Q24H Luis Griffin MD        glucose chewable tablet 16 g  4 tablet Oral PRN Luis Griffin MD        dextrose bolus 10% 125 mL  125 mL IntraVENous PRN Luis Griffin MD        Or    dextrose bolus 10% 250 mL  250 mL IntraVENous PRN Luis Griffin MD        glucagon (rDNA) injection 1 mg  1 mg SubCUTAneous PRN Luis Griffin MD        dextrose 10 % infusion   IntraVENous Continuous PRN Luis Griffin MD        insulin lispro (HUMALOG) injection vial 0-4 Units  0-4 Units SubCUTAneous TID WC Luis Griffin MD        insulin lispro (HUMALOG) injection vial 0-4 Units  0-4 Units SubCUTAneous Nightly Luis Griffin MD        hydrALAZINE (APRESOLINE) injection 10 mg  10 mg IntraVENous Q4H PRN Luis Griffin MD           Allergies:     Allergies   Allergen Reactions    Pcn [Penicillins] Hives    Fentanyl Itching       Problem List:    Patient Active Problem List   Diagnosis Code    PAD (peripheral artery disease) ([facility]) I73.9    Chronic coronary artery disease I25.10    Biventricular ICD (implantable cardioverter-defibrillator) in place Z95.810    Chronic combined systolic and diastolic heart failure ([facility]) I50.42    Chronic kidney disease, stage III (moderate) ([facility]) N18.30    Mixed hyperlipidemia E78.2    Sick sinus syndrome ([facility] I49.5    Type 2 diabetes mellitus with diabetic polyneuropathy ([facility] E11.42    Spinal stenosis of lumbar region M48.061    Obesity, Class I, BMI 30-34.9 E66.9    S/P partial colectomy Z90.49    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    Microalbuminuria R80.9  WD-PVD (peripheral vascular disease) ([facility]) I73.9    Limb ischemia I99.8    Necrotic toes ([facility]) I96    Toe gangrene ([facility]) I96    Diabetic foot infection ([facility]) E11.628, L08.9    Chronic kidney disease (CKD) stage G3a/A2, moderately decreased glomerular filtration rate (GFR) between 45-59 mL/min/1.73 square meter and albuminuria creatinine ratio between  mg/g ([facility]) N18.31    Edema R60.9    ICD (implantable cardioverter-defibrillator) battery depletion Z45.02    Hyperkalemia E87.5    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[facility]) I96    Ischemia of toe I99.8    Acute kidney injury ([facility]) N17.9    Fluid overload E87.70    DM (diabetes mellitus) ([facility]) E11.9    Precordial pain R07.2    Acute chest pain R07.9    Unstable angina ([facility]) I20.0    Chronic kidney disease (CKD) stage G3a/A3, moderately decreased glomerular filtration rate (GFR) between 45-59 mL/min/1.73 square meter and albuminuria creatinine ratio greater than 300 mg/g ([facility]) N18.31    Cardiomyopathy ([facility]) I42.9    Diabetic neuropathy ([facility]) E11.40    HTN (hypertension) I10    Epigastric pain R10.13    Acute on chronic congestive heart failure ([facility]) I50.9    Leg edema R60.0    Acute on chronic systolic CHF (congestive heart failure) ([facility]) I50.23    Diabetic foot ulcer with osteomyelitis ([facility]) E11.621, E11.69, L97.509, M86.9    Visit for wound check Z51.89    Moderate malnutrition ([facility]) E44.0    Long term (current) use of antibiotics Z79.2    WD-Diabetic ulcer of toe of right foot associated with type 2 diabetes mellitus, with fat layer exposed ([address] E11.621, L97.512    WD-Diabetic ulcer of toe of left foot associated with type 2 diabetes mellitus, with fat layer exposed ([address] E11.621, L97.522    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 B87.9    Persistent wound pain R52    Receiving intravenous antibiotic treatment as outpatient Z79.2    Acute on chronic respiratory failure with hypoxemia ([address] J96.21    WD-Chronic foot ulcer, left, with necrosis of bone ([address] Counseling given: Not Answered  Tobacco comments: Client states he has stopped smoking      Vital Signs (Current):   Vitals:    07/26/22 0400 07/26/22 0500 07/26/22 0540 07/26/22 0542   BP: (!) 152/49 (!) 154/57     Pulse: 63 67     Resp: 12 18 16    Temp:  37.1 °C (98.7 °F)     TempSrc:  Oral     SpO2:    100%   Weight:    235 lb 14.3 oz (107 kg)                                              BP Readings from Last 3 Encounters:   07/26/22 (!) 154/57   07/21/22 (!) 200/98   07/19/22 (!) 176/74       NPO Status:                                                                                 BMI:   Wt Readings from Last 3 Encounters:   07/26/22 235 lb 14.3 oz (107 kg)   07/21/22 248 lb (112.5 kg)   02/02/22 241 lb 6.5 oz (109.5 kg)     Body mass index is 31.99 kg/m². CBC:   Lab Results   Component Value Date/Time    WBC 6.0 07/25/2022 06:08 PM    RBC 3.58 07/25/2022 06:08 PM    HGB 10.4 07/25/2022 06:08 PM    HCT 32.8 07/25/2022 06:08 PM    MCV 91.6 07/25/2022 06:08 PM    RDW 14.4 07/25/2022 06:08 PM     07/25/2022 06:08 PM       CMP:   Lab Results   Component Value Date/Time     07/25/2022 06:08 PM    K 4.1 07/25/2022 06:08 PM    K 5.1 01/10/2018 04:32 AM    CL 96 07/25/2022 06:08 PM    CO2 35 07/25/2022 06:08 PM    BUN 26 07/25/2022 06:08 PM    CREATININE 1.9 07/25/2022 06:08 PM    GFRAA 42 07/25/2022 06:08 PM    LABGLOM 35 07/25/2022 06:08 PM    GLUCOSE 177 07/25/2022 06:08 PM    PROT 7.2 07/25/2022 06:08 PM    PROT 6.3 01/21/2013 12:10 PM    CALCIUM 8.6 07/25/2022 06:08 PM    BILITOT 0.3 07/25/2022 06:08 PM    ALKPHOS 105 07/25/2022 06:08 PM    AST 9 07/25/2022 06:08 PM    ALT 6 07/25/2022 06:08 PM       POC Tests:   Recent Labs     07/26/22  0013   POCGLU 243*       Coags:   Lab Results   Component Value Date/Time    PROTIME 13.3 08/30/2021 08:40 AM    PROTIME 11.2 09/08/2011 06:02 PM    INR 1.03 08/30/2021 08:40 AM    APTT 41.7 08/30/2021 08:40 AM       HCG (If Applicable):  No discussed with patient. Plan discussed with CRNA. Attending anesthesiologist reviewed and agrees with Pre Eval content                MICKY Wogn - CRNA   7/26/2022         Pre Anesthesia Assessment complete.  Chart reviewed on 7/26/2022

## 2022-07-26 NOTE — PROGRESS NOTES
1047: Arrive to PACU from [address] Monitors applied, alarms on. Report obtained from Melvi Aguiar and [address]  1100: Turned and repositioned in bed, warm blankets on. Discomfort denied, Left lower leg elevated. 1130: Transported to room 3103. Daughter at bedside.

## 2022-07-26 NOTE — CONSULTS
Infectious Disease Consult Note  2022   Patient Name: Pee Pastor : 1950   Impression:  Left DFO with Cellulitis, Right Foot Cellulitis:  Afebrile, no leukocytosis  -BC pending  -Right foot XR: 1. No acute osseous abnormality evident. No definite findings for   osteomyelitis. 2. Nonspecific edema about the foot may reflect cellulitis. 3. Bones appear osteoporotic. Follow-up imaging recommended if pain persists or worsens following   conservative management. -XR Foot Left: 1. There appears to be extensive bone erosion at the head of the middle   phalanx left 2nd toe versus prior surgical intervention, new compared to   prior study concerning for osteomyelitis. 2. No other definite radiographic features of osteomyelitis demonstrated. 3. Bones are osteoporotic. 4. Diffuse nonspecific edema about the foot may reflect cellulitis. -S/p per Dr. Arsenio Bergeron: left second toe amputation. DX:  infected abrasion of toe of left foot. Cultures: Pending   DMII:  -HbAIC 7.7  CKD3  Obesity: BMI: 31.99  HTN  CAD/ PAD  HF  Chronic Normocytic Anemia  CIED Placement   Allergy to PCN (Hives)  Multi-morbidity: per PMHx    Plan:  Continue IV cefepime, Flagyl and vancomycin  Will plan on two weeks of ABX therapy if negative for OM, 6 weeks if positive  Trend CRP, ordered  Added on ESR   Await BC from -  Await wound culture from   Await surgical pathology from   Thank you for allowing me to consult in the care of this patient.  ------------------------  REASON FOR CONSULT: Infective syndrome, \"OM\"   Requested by: Dr. James Gracia is a 70 y.o. -American male with a history of CKD3, DMII, GERD, obesity, arthritis, CAD, cardiomyopathy, CHF, chronic back pain, HTN, PAD, STACI, CIED , and allergy to PCN (hives) who was admitted 2022 for further evaluation and management of bilateral foot wounds, with left foot amputated toes with exposed bone.   The patient reports he was sent to the ED by his pain management physician to be evaluated by a surgeon. He reports he has had no fevers/chills at home, does have chronic bilateral foot pain. States he does follow at the wound care clinic. ? Infectious diseases service was consulted to evaluate the pt, and recommend further investigative and therapeutic measures. ROS: Other systems reviewed Including eyes, ENT, respiratory, cardiovascular, GI, , dermatologic, neurologic, psych, hem/lymphatic, musculoskeletal and endocrine were negative other than what is mentioned above.      Patient Active Problem List    Diagnosis Date Noted    Precordial pain 07/18/2018    Acute chest pain     Diabetic ulcer of right foot due to type 2 diabetes mellitus ([address] 07/25/2022    Acute on chronic HFrEF (heart failure with reduced ejection fraction) ([address] 01/22/2022    Scrotal edema 01/22/2022    Hypertensive urgency 01/22/2022    WD-Chronic foot ulcer, left, with necrosis of bone ([address] 11/12/2021    Acute on chronic respiratory failure with hypoxemia ([address] 10/29/2021    Receiving intravenous antibiotic treatment as outpatient 07/18/2021    Persistent wound pain     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     WD-Diabetic ulcer of toe of right foot associated with type 2 diabetes mellitus, with fat layer exposed ([address] 06/18/2021    WD-Diabetic ulcer of toe of left foot associated with type 2 diabetes mellitus, with fat layer exposed ([address] 06/18/2021    Long term (current) use of antibiotics 06/14/2021    Moderate malnutrition ([address] 06/08/2021    Visit for wound check     Diabetic foot ulcer with osteomyelitis ([address] 06/07/2021    Acute on chronic systolic CHF (congestive heart failure) ([address] 12/21/2020    Leg edema     Acute on chronic congestive heart failure ([address] 12/10/2020    Epigastric pain 08/12/2020    HTN (hypertension) 05/20/2019    Diabetic neuropathy ([address] 05/02/2019    Chronic kidney disease (CKD) stage G3a/A3, moderately decreased glomerular filtration rate (GFR) between 45-59 mL/min/1.73 square meter and albuminuria creatinine ratio greater than 300 mg/g ([facility]) 01/29/2019    Cardiomyopathy ([facility] 01/29/2019    Unstable angina ([facility] 11/19/2018    Acute kidney injury ([facility] 02/09/2018    Fluid overload 02/09/2018    DM (diabetes mellitus) ([facility] 02/09/2018    Ischemia of toe     Hyperkalemia 01/09/2018    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[facility]     ICD (implantable cardioverter-defibrillator) battery depletion 10/11/2017    Chronic kidney disease (CKD) stage G3a/A2, moderately decreased glomerular filtration rate (GFR) between 45-59 mL/min/1.73 square meter and albuminuria creatinine ratio between  mg/g ([facility] 10/06/2017    Edema 10/06/2017    Diabetic foot infection ([facility]     Toe gangrene ([facility] 07/26/2017    Necrotic toes ([facility] 07/06/2017    WD-PVD (peripheral vascular disease) ([facility]     Limb ischemia     Microalbuminuria 01/22/2017    Tubulovillous adenoma of colon 10/20/2016    S/P partial colectomy 08/27/2016    Chronic coronary artery disease 05/31/2016    Chronic kidney disease, stage III (moderate) ([facility] 05/31/2016    Sick sinus syndrome ([facility] 05/31/2016    Type 2 diabetes mellitus with diabetic polyneuropathy ([facility] 05/31/2016    Spinal stenosis of lumbar region 05/31/2016    Obesity, Class I, BMI 30-34.9 05/31/2016    Chronic combined systolic and diastolic heart failure ([facility] 03/03/2016    Biventricular ICD (implantable cardioverter-defibrillator) in place 09/03/2015    Mixed hyperlipidemia 04/13/2015    PAD (peripheral artery disease) ([facility] 07/24/2012     Past Medical History:   Diagnosis Date    Acid reflux     Acute MI ([facility] 2004, 2008    Arthritis     Back    Broken teeth     Upper Front    CAD (coronary artery disease)     Sees Dr. Sudie Allie [facility])     per old chart    CHF (congestive heart failure) (HCC)     Chronic back pain     Chronic kidney disease     STAGE 3 KIDNEY FAILURE- \"from my diabetes- do not follow with any one- have seen Dr Freddy Ellison in the past\"    Diabetes mellitus [facility]) Dx 1965    per old chart pt has been diabetic since age 13    Diabetic neuropathy ([address]     \"in my feet\"    H/O cardiovascular stress test 08/25/2016    H/O Doppler ultrasound 09/27/2018    Moderate disease of the right lower extremity with an JALEN of 0.72. Moderate to severe disease of the left lower extremity with an JALEN of 0.55.     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    Neuropathy     both feet    neuropathy     PAD (peripheral artery disease) ([address] 09/27/2018    PVD (peripheral vascular disease) ([address]     Sick sinus syndrome ([address]     Sleep apnea     \"sleep study 3 yrs ago- could not tolerate the cpap made me too dry\"    Spinal stenosis     Teeth missing     Upper And Lower    Type 2 diabetes mellitus without complication ([address]     WD-Chronic foot ulcer, left, with necrosis of bone ([address] 11/12/2021      Past Surgical History:   Procedure Laterality Date    CARDIAC CATHETERIZATION      per old chart done 10/2014    CARDIAC CATHETERIZATION  07/14/2017    with angiography of leg    CARDIAC CATHETERIZATION  11/20/2018    PATENT STENTS OF ALL THREE MAJOR VESSELS    CARDIAC DEFIBRILLATOR PLACEMENT  06/04/2010    [employer] Secura DR Defibrillator Implanted    COLECTOMY Right 08/26/2016    laparascopic; robotic assisted    COLONOSCOPY  08/04/2016    CORONARY ANGIOPLASTY      \"15 Heart Stents\"    CORONARY ANGIOPLASTY WITH STENT PLACEMENT      per old chart had angio with stent to circumflex and obtuse marginal artery at [facility] 5/2010( old chart also gives hx of stent placement done 2000,2004 and 2005) DENTAL SURGERY      Teeth Extracted In Past    IR TUNNELED CATHETER PLACEMENT GREATER THAN 5 YEARS  2021    IR TUNNELED CATHETER PLACEMENT GREATER THAN 5 YEARS 2021 [facility] PROCED[facility]    PACEMAKER PLACEMENT  2010    [employer]tronic Secura DR Defibrillator Implanted    TOE AMPUTATION Right 2017    Rt 3rd toe    TOE AMPUTATION Right 2018     Right 5th toe amputation and Toenails trimmed left 2,3,4 and 5th toes    TOE AMPUTATION Left 2020    LEFT GREAT TOE AMPUTATION performed by Mayank Singleton MD at [address] [place] [address]      per old chart had balloon angioplasty right superfical femoral artery,right popliteal artery,,right ant.tibial artery, right tibioperoneal trunk, and right post.tibial artery wna stent placement right popliteal artery and superfical femoral artery 2012      Family History   Problem Relation Age of Onset    Diabetes Mother     Stroke Mother     High Blood Pressure Mother     Vision Loss Mother     Cancer Father         Prostate Cancer    Diabetes Sister     Neuropathy Sister     Other Sister         \"Breathing Problems\"    Heart Disease Sister     Early Death Sister 62        Heart Complications    Cancer Brother         \"Stomach Cancer\"    High Blood Pressure Brother     Diabetes Brother     Heart Disease Brother     High Blood Pressure Brother     Cancer Son         \"Testicle Cancer\"      Infectious disease related family history - not contibutory.    SOCIAL HISTORY  Social History     Tobacco Use    Smoking status: Former     Packs/day: 0.25     Years: 36.00     Pack years: 9.00     Types: Cigars, Cigarettes     Start date: 1980     Quit date: 10/22/2021     Years since quittin.7    Smokeless tobacco: Never    Tobacco comments:     Client states he has stopped smoking   Substance Use Topics    Alcohol use: No      Born:[place], [place]  Lives:[place] with family  Occupation:Retired   No recent travel of osteoporotic. 4. Diffuse nonspecific edema about the foot may reflect cellulitis. ??  I have examined this patient and available medical records on this date and have made the above observations, conclusions and recommendations. Electronically signed by: Electronically signed by Jero Augustine.  MICKY Louise CNP on 7/26/2022 at 10:28 AM

## 2022-07-26 NOTE — PROGRESS NOTES
[address] [facility]  consulted by Dr. Ofe Cabrales MD for monitoring and adjustment. Indication for treatment: skin/soft tissue infection  Goal trough: [x] 10-15 mcg/mL or [] 15-20 mcg/ml  AUC/RJ: [x] <500 or [] 400-600    Pertinent Laboratory Values:   Temp Readings from Last 3 Encounters:   07/25/22 98.1 °F (36.7 °C)   07/19/22 97.9 °F (36.6 °C) (Temporal)   04/19/22 97 °F (36.1 °C) (Temporal)     Recent Labs     07/25/22  1808   WBC 6.0   LACTATE 0.8     Recent Labs     07/25/22  1808   BUN 26*   CREATININE 1.9*     Estimated Creatinine Clearance: 46 mL/min (A) (based on SCr of 1.9 mg/dL (H)). No intake or output data in the 24 hours ending 07/25/22 2129    Pertinent Cultures:  Date    Source    Results  07/25   blood    pending  07/25   wound    pending    Vancomycin level:   TROUGH:  No results for input(s): VANCOTROUGH in the last 72 hours. RANDOM:  No results for input(s): VANCORANDOM in the last 72 hours. Assessment:  SCr, BUN, and urine output: Cr 1.9, BUN 26  Day(s) of therapy: Day 1  Vancomycin concentration: To be collected    Plan:  Vancomycin 2000mg IVPB was given earlier in ED, will follow with 1000mg Q24h and check level to assess dosing  Pharmacy will continue to monitor patient and adjust therapy as indicated    VANCOMYCIN CONCENTRATION SCHEDULED FOR 07/27 @0600     Thank you for the consult.   Emma Burch [facility]  7/25/2022 9:29 PM

## 2022-07-26 NOTE — PLAN OF CARE
Problem: Discharge Planning  Goal: Discharge to home or other facility with appropriate resources  Outcome: Progressing Towards Goal     Problem: Pain  Goal: Verbalizes/displays adequate comfort level or baseline comfort level  Outcome: Progressing Towards Goal     Problem: Skin/Tissue Integrity  Goal: Absence of new skin breakdown  Description: 1. Monitor for areas of redness and/or skin breakdown  2. Assess vascular access sites hourly  3. Every 4-6 hours minimum:  Change oxygen saturation probe site  4. Every 4-6 hours:  If on nasal continuous positive airway pressure, respiratory therapy assess nares and determine need for appliance change or resting period.   Outcome: Progressing Towards Goal     Problem: Safety - Adult  Goal: Free from fall injury  Outcome: Progressing Towards Goal

## 2022-07-27 LAB
CREAT SERPL-MCNC: 2 MG/DL (ref 0.9–1.3)
DOSE AMOUNT: NORMAL
DOSE TIME: NORMAL
GFR AFRICAN AMERICAN: 40 ML/MIN/1.73M2
GFR NON-AFRICAN AMERICAN: 33 ML/MIN/1.73M2
GLUCOSE BLD-MCNC: 143 MG/DL (ref 70–99)
GLUCOSE BLD-MCNC: 148 MG/DL (ref 70–99)
GLUCOSE BLD-MCNC: 175 MG/DL (ref 70–99)
GLUCOSE BLD-MCNC: 207 MG/DL (ref 70–99)
HIGH SENSITIVE C-REACTIVE PROTEIN: 52.4 MG/L
VANCOMYCIN RANDOM: 18.7 UG/ML

## 2022-07-27 PROCEDURE — 2580000003 HC RX 258: Performed by: PHYSICIAN ASSISTANT

## 2022-07-27 PROCEDURE — 36415 COLL VENOUS BLD VENIPUNCTURE: CPT

## 2022-07-27 PROCEDURE — 94761 N-INVAS EAR/PLS OXIMETRY MLT: CPT

## 2022-07-27 PROCEDURE — 6360000002 HC RX W HCPCS: Performed by: PHYSICIAN ASSISTANT

## 2022-07-27 PROCEDURE — 99024 POSTOP FOLLOW-UP VISIT: CPT | Performed by: PHYSICIAN ASSISTANT

## 2022-07-27 PROCEDURE — 82962 GLUCOSE BLOOD TEST: CPT

## 2022-07-27 PROCEDURE — 80202 ASSAY OF VANCOMYCIN: CPT

## 2022-07-27 PROCEDURE — APPNB45 APP NON BILLABLE 31-45 MINUTES: Performed by: PHYSICIAN ASSISTANT

## 2022-07-27 PROCEDURE — 6370000000 HC RX 637 (ALT 250 FOR IP): Performed by: PHYSICIAN ASSISTANT

## 2022-07-27 PROCEDURE — 82565 ASSAY OF CREATININE: CPT

## 2022-07-27 PROCEDURE — 86141 C-REACTIVE PROTEIN HS: CPT

## 2022-07-27 PROCEDURE — 2500000003 HC RX 250 WO HCPCS: Performed by: PHYSICIAN ASSISTANT

## 2022-07-27 PROCEDURE — 1200000000 HC SEMI PRIVATE

## 2022-07-27 PROCEDURE — 2700000000 HC OXYGEN THERAPY PER DAY

## 2022-07-27 PROCEDURE — 94640 AIRWAY INHALATION TREATMENT: CPT

## 2022-07-27 RX ADMIN — METRONIDAZOLE 500 MG: 500 INJECTION, SOLUTION INTRAVENOUS at 00:00

## 2022-07-27 RX ADMIN — TIOTROPIUM BROMIDE INHALATION SPRAY 2 PUFF: 3.12 SPRAY, METERED RESPIRATORY (INHALATION) at 07:32

## 2022-07-27 RX ADMIN — METAXALONE 800 MG: 800 TABLET ORAL at 20:45

## 2022-07-27 RX ADMIN — MORPHINE SULFATE 4 MG: 4 INJECTION, SOLUTION INTRAMUSCULAR; INTRAVENOUS at 01:59

## 2022-07-27 RX ADMIN — SODIUM CHLORIDE, PRESERVATIVE FREE 10 ML: 5 INJECTION INTRAVENOUS at 20:41

## 2022-07-27 RX ADMIN — CARVEDILOL 3.12 MG: 6.25 TABLET, FILM COATED ORAL at 09:18

## 2022-07-27 RX ADMIN — ATORVASTATIN CALCIUM 40 MG: 40 TABLET, FILM COATED ORAL at 20:45

## 2022-07-27 RX ADMIN — CLOPIDOGREL BISULFATE 75 MG: 75 TABLET ORAL at 09:18

## 2022-07-27 RX ADMIN — AMLODIPINE BESYLATE 10 MG: 10 TABLET ORAL at 09:18

## 2022-07-27 RX ADMIN — CEFEPIME HYDROCHLORIDE 2000 MG: 2 INJECTION, POWDER, FOR SOLUTION INTRAVENOUS at 05:49

## 2022-07-27 RX ADMIN — SODIUM CHLORIDE 25 ML: 9 INJECTION, SOLUTION INTRAVENOUS at 18:43

## 2022-07-27 RX ADMIN — HEPARIN SODIUM 5000 UNITS: 5000 INJECTION INTRAVENOUS; SUBCUTANEOUS at 20:46

## 2022-07-27 RX ADMIN — CEFEPIME HYDROCHLORIDE 2000 MG: 2 INJECTION, POWDER, FOR SOLUTION INTRAVENOUS at 18:45

## 2022-07-27 RX ADMIN — HEPARIN SODIUM 5000 UNITS: 5000 INJECTION INTRAVENOUS; SUBCUTANEOUS at 16:37

## 2022-07-27 RX ADMIN — MORPHINE SULFATE 4 MG: 4 INJECTION, SOLUTION INTRAMUSCULAR; INTRAVENOUS at 20:54

## 2022-07-27 RX ADMIN — ONDANSETRON 4 MG: 4 TABLET, ORALLY DISINTEGRATING ORAL at 18:13

## 2022-07-27 RX ADMIN — METRONIDAZOLE 500 MG: 500 INJECTION, SOLUTION INTRAVENOUS at 18:46

## 2022-07-27 RX ADMIN — VANCOMYCIN HYDROCHLORIDE 1000 MG: 1 INJECTION, POWDER, LYOPHILIZED, FOR SOLUTION INTRAVENOUS at 20:48

## 2022-07-27 RX ADMIN — HEPARIN SODIUM 5000 UNITS: 5000 INJECTION INTRAVENOUS; SUBCUTANEOUS at 05:40

## 2022-07-27 RX ADMIN — SODIUM CHLORIDE, PRESERVATIVE FREE 10 ML: 5 INJECTION INTRAVENOUS at 09:17

## 2022-07-27 RX ADMIN — METRONIDAZOLE 500 MG: 500 INJECTION, SOLUTION INTRAVENOUS at 05:48

## 2022-07-27 RX ADMIN — CARVEDILOL 3.12 MG: 6.25 TABLET, FILM COATED ORAL at 16:41

## 2022-07-27 RX ADMIN — METRONIDAZOLE 500 MG: 500 INJECTION, SOLUTION INTRAVENOUS at 12:24

## 2022-07-27 ASSESSMENT — ENCOUNTER SYMPTOMS
COLOR CHANGE: 0
PHOTOPHOBIA: 0
BACK PAIN: 0
CHOKING: 0
BACK PAIN: 1
EYE ITCHING: 0
CONSTIPATION: 0
SORE THROAT: 0
EYE REDNESS: 0
ANAL BLEEDING: 0
RECTAL PAIN: 0
APNEA: 0
STRIDOR: 0

## 2022-07-27 ASSESSMENT — PAIN DESCRIPTION - ORIENTATION: ORIENTATION: RIGHT;LEFT

## 2022-07-27 ASSESSMENT — PAIN DESCRIPTION - FREQUENCY: FREQUENCY: CONTINUOUS

## 2022-07-27 ASSESSMENT — PAIN DESCRIPTION - LOCATION
LOCATION: FOOT
LOCATION: FOOT

## 2022-07-27 ASSESSMENT — PAIN SCALES - GENERAL
PAINLEVEL_OUTOF10: 0
PAINLEVEL_OUTOF10: 9
PAINLEVEL_OUTOF10: 8
PAINLEVEL_OUTOF10: 8

## 2022-07-27 ASSESSMENT — PAIN - FUNCTIONAL ASSESSMENT: PAIN_FUNCTIONAL_ASSESSMENT: PREVENTS OR INTERFERES SOME ACTIVE ACTIVITIES AND ADLS

## 2022-07-27 ASSESSMENT — PAIN DESCRIPTION - PAIN TYPE: TYPE: CHRONIC PAIN

## 2022-07-27 ASSESSMENT — PAIN DESCRIPTION - ONSET: ONSET: ON-GOING

## 2022-07-27 NOTE — CONSULTS
Department of GeneralSurgery   Surgical Service Dr Sudarshan Alexandre   Consult Note    Date of Consult: 7/26/22      Reason for Consult: Left great toe open wound with drainage  Requesting Physician: Hospitalist    CHIEF COMPLAINT: Left foot pain    History Obtained From:  patient    HISTORY OF PRESENT ILLNESS:                The patient is a 70 y.o. male who presents with open the draining left second toe wound. There is exposed phalanx noted on palpation per patient. Patient is known to me from previous encounters of toe amputations and management of foot wounds. He is a known diabetic with chronic venous stasis disease. He was seen earlier by his pain specialist and he was sent to the ER for evaluation of these wounds. He also has right-sided foot wound which is stable. Patient has neuropathy. Pain is described as dull. Pain level is 2/10. He denies any fever or chills. Past Medical History:    Past Medical History:   Diagnosis Date    Acid reflux     Acute MI ([address] 2004, 2008    Arthritis     Back    Broken teeth     Upper Front    CAD (coronary artery disease)     Sees Dr. Yolande Mancia [facility])     per old chart    CHF (congestive heart failure) ([facility]     Chronic back pain     Chronic kidney disease     STAGE 3 KIDNEY FAILURE- \"from my diabetes- do not follow with any one- have seen Dr Moriah Dodge in the past\"    Diabetes mellitus ([facility] [address] Dx 1965    per old chart pt has been diabetic since age 13    Diabetic neuropathy ([facility] [address]     \"in my feet\"    H/O cardiovascular stress test 08/25/2016    H/O Doppler ultrasound 09/27/2018    Moderate disease of the right lower extremity with an JALEN of 0.72. Moderate to severe disease of the left lower extremity with an JALNE of 0.55.     H/O percutaneous left heart catheterization 11/20/2018    PATENT STENTS OF ALL THREE MAJOR VESSELS    History of irregular heartbeat     History of syncope     per old chart pt had hx syncope and dizziness for multiple yrs so ICD placed [facility], ASHIA        heparin (porcine) injection 5,000 Units  5,000 Units SubCUTAneous 3 times per day [facility], PA-C   5,000 Units at 22 0540    metronidazole (FLAGYL) 500 mg in 0.9% NaCl 100 mL IVPB premix  500 mg IntraVENous Q6H [facility], PA-C   Stopped at 22 6641    morphine sulfate (PF) injection 4 mg  4 mg IntraVENous Q4H PRN [facility], PA-C   4 mg at 22 0159    cefepime (MAXIPIME) 2000 mg IVPB minibag  2,000 mg IntraVENous Q12H [facility], PA-C   Stopped at 22 3260    vancomycin (VANCOCIN) 1,000 mg in dextrose 5 % 250 mL IVPB ([id number])  1,000 mg IntraVENous Q24H [facility], PA-C   Stopped at 22 2240    glucose chewable tablet 16 g  4 tablet Oral PRN [facility]        dextrose bolus 10% 125 mL  125 mL IntraVENous PRN [facility]        Or    dextrose bolus 10% 250 mL  250 mL IntraVENous PRN [facility]        glucagon (rDNA) injection 1 mg  1 mg SubCUTAneous PRN [facility], ASHIA        dextrose 10 % infusion   IntraVENous Continuous PRN [facility]        insulin lispro (HUMALOG) injection vial 0-4 Units  0-4 Units SubCUTAneous TID WC Heidi Burns PA-C        insulin lispro (HUMALOG) injection vial 0-4 Units  0-4 Units SubCUTAneous Nightly Heidi Burns PA-C        hydrALAZINE (APRESOLINE) injection 10 mg  10 mg IntraVENous Q4H PRN [facility]           Allergies:  Pcn [penicillins] and Fentanyl    Social History:   Social History     Socioeconomic History    Marital status:       Spouse name: None    Number of children: None    Years of education: None    Highest education level: None   Tobacco Use    Smoking status: Former     Packs/day: 0.25     Years: 36.00     Pack years: 9.00     Types: Cigars, Cigarettes     Start date: 1980     Quit date: 10/22/2021     Years since quittin.7    Smokeless tobacco: Never    Tobacco comments:     Client states he has stopped smoking   Vaping Use    Vaping Use: Never used   Substance and Sexual Activity    Alcohol use: No    Drug use: Yes     Types: Marijuana Alisha Benraul)    Sexual activity: Never       Family History:   Family History   Problem Relation Age of Onset    Diabetes Mother     Stroke Mother     High Blood Pressure Mother     Vision Loss Mother     Cancer Father         Prostate Cancer    Diabetes Sister     Neuropathy Sister     Other Sister         \"Breathing Problems\"    Heart Disease Sister     Early Death Sister 62        Heart Complications    Cancer Brother         \"Stomach Cancer\"    High Blood Pressure Brother     Diabetes Brother     Heart Disease Brother     High Blood Pressure Brother     Cancer Son         \"Testicle Cancer\"       REVIEW OFSYSTEMS:    Review of Systems   Constitutional:  Negative for chills and fever. HENT:  Negative for ear pain, mouth sores, sore throat and tinnitus. Eyes:  Negative for photophobia, redness and itching. Respiratory:  Negative for apnea, choking and stridor. Cardiovascular:  Negative for chest pain and palpitations. Gastrointestinal:  Negative for anal bleeding, constipation and rectal pain. Endocrine: Negative for polydipsia. Genitourinary:  Negative for enuresis, flank pain and hematuria. Musculoskeletal:  Positive for back pain, gait problem, joint swelling and myalgias. Skin:  Positive for wound. Negative for color change and pallor. Allergic/Immunologic: Negative for environmental allergies. Neurological:  Negative for syncope and speech difficulty. Psychiatric/Behavioral:  Negative for confusion and hallucinations. PHYSICAL EXAM:  Vitals:    07/27/22 0500 07/27/22 0906 07/27/22 0910 07/27/22 0918   BP: (!) 146/59 (!) 182/58 (!) 179/64 (!) 179/64   Pulse: 69 62  64   Resp: 10 13     Temp:  98 °F (36.7 °C)     TempSrc:  Oral     SpO2: 100% 100%     Weight: 252 lb 6.8 oz (114.5 kg)      Height:           Physical Exam  Constitutional:       Appearance: He is well-developed. HENT:      Head: Normocephalic. Eyes:      Pupils: Pupils are equal, round, and reactive to light. Cardiovascular:      Rate and Rhythm: Normal rate. Pulmonary:      Effort: Pulmonary effort is normal.   Abdominal:      General: There is no distension. Palpations: Abdomen is soft. There is no mass. Tenderness: There is no abdominal tenderness. There is no guarding or rebound. Musculoskeletal:         General: Swelling and deformity present. Normal range of motion. Cervical back: Normal range of motion and neck supple. Feet:    Skin:     General: Skin is warm. Neurological:      Mental Status: He is alert and oriented to person, place, and time.          DATA:    CBC with Differential:    Lab Results   Component Value Date/Time    WBC 6.0 07/25/2022 06:08 PM    RBC 3.58 07/25/2022 06:08 PM    HGB 9.9 07/26/2022 06:03 PM    HCT 33.1 07/26/2022 06:03 PM     07/25/2022 06:08 PM    MCV 91.6 07/25/2022 06:08 PM    MCH 29.1 07/25/2022 06:08 PM    MCHC 31.7 07/25/2022 06:08 PM    RDW 14.4 07/25/2022 06:08 PM    SEGSPCT 67.6 07/25/2022 06:08 PM    LYMPHOPCT 24.6 07/25/2022 06:08 PM    MONOPCT 5.1 07/25/2022 06:08 PM    EOSPCT 1.5 09/08/2011 06:02 PM    BASOPCT 0.3 07/25/2022 06:08 PM    MONOSABS 0.3 07/25/2022 06:08 PM    LYMPHSABS 1.5 07/25/2022 06:08 PM    EOSABS 0.1 07/25/2022 06:08 PM    BASOSABS 0.0 07/25/2022 06:08 PM    DIFFTYPE AUTOMATED DIFFERENTIAL 07/25/2022 06:08 PM     CMP:    Lab Results   Component Value Date/Time     07/25/2022 06:08 PM    K 4.1 07/25/2022 06:08 PM    K 5.1 01/10/2018 04:32 AM    CL 96 07/25/2022 06:08 PM    CO2 35 07/25/2022 06:08 PM    BUN 26 07/25/2022 06:08 PM    CREATININE 2.0 07/27/2022 01:40 AM    GFRAA 40 07/27/2022 01:40 AM    LABGLOM 33 07/27/2022 01:40 AM    GLUCOSE 177 07/25/2022 06:08 PM    PROT 7.2 07/25/2022 06:08 PM    PROT 6.3 01/21/2013 12:10 PM    LABALBU 3.5 07/25/2022 06:08 PM    CALCIUM 8.6 07/25/2022 06:08 PM    BILITOT 0.3 07/25/2022 06:08 PM    ALKPHOS 105 07/25/2022 06:08 PM    AST 9 07/25/2022 06:08 PM    ALT 6 07/25/2022 06:08 PM       IMPRESSION:        Patient Active Problem List:     PAD (peripheral artery disease) ([facility])     Chronic coronary artery disease     Biventricular ICD (implantable cardioverter-defibrillator) in place     Chronic combined systolic and diastolic heart failure ([facility])     Chronic kidney disease, stage III (moderate) ([facility])     Mixed hyperlipidemia     Sick sinus syndrome ([facility])     Type 2 diabetes mellitus with diabetic polyneuropathy ([facility]     Spinal stenosis of lumbar region     Obesity, Class I, BMI 30-34.9     S/P partial colectomy     Tubulovillous adenoma of colon     Microalbuminuria     WD-PVD (peripheral vascular disease) ([facility])     Limb ischemia     Necrotic toes ([facility])     Toe gangrene ([facility])     Diabetic foot infection ([address]     Chronic kidney disease (CKD) stage G3a/A2, moderately decreased glomerular filtration rate (GFR) between 45-59 mL/min/1.73 square meter and albuminuria creatinine ratio between  mg/g ([facility])     Edema     ICD (implantable cardioverter-defibrillator) battery depletion     Hyperkalemia     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[facility])     Ischemia of toe     Acute kidney injury ([address]     Fluid overload     DM (diabetes mellitus) ([facility])     Precordial pain     Acute chest pain     Unstable angina ([facility])     Chronic kidney disease (CKD) stage G3a/A3, moderately decreased glomerular filtration rate (GFR) between 45-59 mL/min/1.73 square meter and albuminuria creatinine ratio greater than 300 mg/g ([facility])     Cardiomyopathy ([facility])     Diabetic neuropathy ([facility])     HTN (hypertension)     Epigastric pain     Acute on chronic congestive heart failure ([facility])     Leg edema     Acute on chronic systolic CHF (congestive heart failure) ([facility])     Diabetic foot ulcer with osteomyelitis ([address]     Visit for wound check     Moderate malnutrition ([address]     Long term (current) use of antibiotics     WD-Diabetic ulcer of toe of right foot associated with type 2 diabetes mellitus, with fat layer exposed ([address]     Diabetic ulcer of toe associated with type 2 diabetes mellitus, with bone involvement without evidence of necrosis ([address]     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     Persistent wound pain     Receiving intravenous antibiotic treatment as outpatient     Acute on chronic respiratory failure with hypoxemia ([address]     WD-Chronic foot ulcer, left, with necrosis of bone ([facility])     Acute on chronic HFrEF (heart failure with reduced ejection fraction) ([facility])     Scrotal edema     Hypertensive urgency     Diabetic ulcer of right foot due to type 2 diabetes mellitus ([address]     Cellulitis of foot     Toe osteomyelitis ([address]      PLAN:    Will proceed with left second toe amputation this am. Pt has been NPO since midnight.          Mortimer Dawson, MD

## 2022-07-27 NOTE — PROGRESS NOTES
V2.0  [facility] Hospitalist Progress Note      Name:  Tegan Adamson /Age/Sex: 1950  (70 y.o. male)   MRN & CSN:  [id number] & [id number] Encounter Date/Time: 2022 1:57 PM EDT    Location:  [address] [place] [address]-A PCP: Zeferino Villanueva Day: 3    Assessment and Plan:   Tegan Adamson is a 70 y.o. male who presents with left second toe ulcer with cellulitis      Plan:  Right foot cellulitis and left second toe diabetic foot osteomyelitis (ischemic toe) with cellulitis and osteomyelitis s/p left second toe amputation   -left foot x-ray : Extensive bony erosion at head of middle phalanx left second toe; diffuse nonspecific edema about foot. General surgery following. On vancomycin, Flagyl, and cefepime. CRP 52. ESR 59. BCx : NGTD. CKD 3  -creatinine 2.0  DM2  -on sliding scale insulin. A1c 7.7. Chronic respiratory failure  -on 2 L nasal cannula      HTN  SSS s/p pacer  PVD    Diet ADULT DIET; Regular; 4 carb choices (60 gm/meal)    DVT Prophylaxis [] Lovenox, []  Heparin, [] SCDs, [] Ambulation,  [] Eliquis, [] Xarelto  [] Coumadin   Code Status Full Code   Disposition From: home  Expected Disposition: Home with home health care  Estimated Date of Discharge:   Patient requires continued admission due to celluitis and osteomyelitis, and awaiting clearance by general surgery   Home O2 2LNC at night     Subjective/Interval history:   Chief Complaint: Wound Check (Wounds to bilateral feet/toes)     He is having pain in his left foot, dressing was taken off to allow exposure to air     Review of Systems:    Negative unless mentioned above    Objective:      Intake/Output Summary (Last 24 hours) at 2022 1213  Last data filed at 2022 0549  Gross per 24 hour   Intake 1000 ml   Output 300 ml   Net 700 ml        Vitals:   BP (!) 179/64   Pulse 66   Temp 98.1 °F (36.7 °C) (Axillary)   Resp 21   Ht 6' (1.829 m)   Wt 252 lb 6.8 oz (114.5 kg)   SpO2 97%   BMI 34.24 kg/m² Physical Exam:   General: NAD, laying in bed  Eyes: no discharge  HENT: NCAT  Cardiovascular: RRR  Respiratory: Clear to auscultation b/l bs+, on [facility]  Gastrointestinal: Soft, non tender, nondistended  Genitourinary: no suprapubic tenderness  Musculoskeletal: legs are edematous, left foot is unwrapped, appears to have surgical wound on second toe  Skin: right big toe is wrapped  Neuro: Alert. No gross deficits  Psych: Mood appropriate.      Medications:   Medications:    clopidogrel  75 mg Oral Daily    atorvastatin  40 mg Oral Nightly    carvedilol  3.125 mg Oral BID WC    metaxalone  800 mg Oral TID    amLODIPine  10 mg Oral Daily    tiotropium  2 puff Inhalation Daily    sodium chloride flush  5-40 mL IntraVENous 2 times per day    heparin (porcine)  5,000 Units SubCUTAneous 3 times per day    metroNIDAZOLE  500 mg IntraVENous Q6H    cefepime  2,000 mg IntraVENous Q12H    vancomycin  1,000 mg IntraVENous Q24H    insulin lispro  0-4 Units SubCUTAneous TID     insulin lispro  0-4 Units SubCUTAneous Nightly      Infusions:    sodium chloride 25 mL (07/26/22 0928)    dextrose       PRN Meds: albuterol sulfate HFA, 2 puff, Q4H PRN  sodium chloride flush, 5-40 mL, PRN  sodium chloride, 25 mL, PRN  ondansetron, 4 mg, Q8H PRN   Or  ondansetron, 4 mg, Q6H PRN  polyethylene glycol, 17 g, Daily PRN  acetaminophen, 650 mg, Q6H PRN   Or  acetaminophen, 650 mg, Q6H PRN  morphine, 4 mg, Q4H PRN  glucose, 4 tablet, PRN  dextrose bolus, 125 mL, PRN   Or  dextrose bolus, 250 mL, PRN  glucagon (rDNA), 1 mg, PRN  dextrose, , Continuous PRN  hydrALAZINE, 10 mg, Q4H PRN      Labs      Recent Labs     07/25/22 1808 07/26/22  1803   WBC 6.0  --    HGB 10.4* 9.9*   HCT 32.8* 33.1*     --       Recent Labs     07/25/22  1808 07/27/22  0140     --    K 4.1  --    CL 96*  --    CO2 35*  --    BUN 26*  --    CREATININE 1.9* 2.0*     Recent Labs     07/25/22  1808   AST 9*   ALT 6*   BILITOT 0.3   ALKPHOS 105     No results for input(s): INR in the last 72 hours. No results for input(s): CKTOTAL, CKMB, CKMBINDEX, TROPONINT in the last 72 hours.   Lab Results   Component Value Date/Time    LABA1C 7.7 07/25/2022 08:08 PM     CALCIUM:  8.6/35 (07/25 1808)  Lab Results   Component Value Date/Time    MG 1.9 07/14/2022 12:01 PM           Electronically signed by Ella Whaley MD on 7/27/2022 at 12:13 PM

## 2022-07-27 NOTE — OP NOTE
Procedure Note:    Patient ID:  Ander Pierson  [id number]  70 y.o.  1950    Indications: Ischemic left second toe with exposed phalanx      Pre-operative Diagnosis: Ischemic left second toe    Post-operative Diagnosis: same    Procedure:  Amputation of left second toe    Surgeon: Corinne Pringle, MD     First Assistant: Allen Galaviz PA-C  The  Use of a first assistant was necessary for the proper positioning, prepping, and draping of the patient, as well as the safe and expeditious execution of the case and closure of skin and subcutaneous tissues. Findings:  same    Estimated Blood Loss:  Minimal           Total IV Fluids: 500 ml            Complications:  None; patient tolerated the procedure well. Disposition: PACU - hemodynamically stable. Condition: stable    Procedure Details: The patient was seen again in the Holding Room. The risks, benefits, complications, treatment options, and expected outcomes were discussed with the patient. The possibilities of reaction to medication, pulmonary aspiration, bleeding, recurrent infection, the need for additional procedures, and development of a complication requiring transfusion or further operation were discussed with the patient and/or family. There was concurrence with the proposed plan, and informed consent was obtained. The site of surgery was properly noted/marked. The patient was taken to the Operating Room, identified as Ander Pierson, and the procedure verified. A Time Out was held and the above information confirmed. The patient was brought to the operating room and   placed supine. After the induction of anesthesia, the left foot was prepped   and draped in the usual sterile fashion.  Then, 0.25% Marcaine was   infiltrated around the base of the second toe and a fish-mouth type of   incision was made at the base of the toe circumferentially, and the incision   was deepened through subcutaneous tissue using Bovie electrocautery with good   hemostasis. The proximal phalanx was isolated and circumferentially dissected using the   periosteal elevator. The bone was cut at the tarsometatarsal joint and the   wound was hemostatic using Bovie electrocautery. The specimen was sent to   Pathology. Using 3-0 Vicryl, the fascial plane was closed covering the bony stump and 4-0 nylon was used to approximate the skin in interrupted fashion. Fluffs and Kerlix   dressings were applied. The patient was subsequently transferred to the   recovery room in stable condition. Instrument and lap counts were correct at the end of the case.        Abdoul Li MD

## 2022-07-27 NOTE — CARE COORDINATION
[id number] [facility] Liaison spoke with pt and pt's daughter Keli Bledsoe and they are agreeable to hhc at discharge. Both are willing to learn IV and wound care. Address and numbers verified.  Please place inpatient consult to home health needs order in [facility] [place].

## 2022-07-27 NOTE — PROGRESS NOTES
[id number]  consulted by Dr. Ascencion Encinas MD for monitoring and adjustment. Indication for treatment: skin/soft tissue infection  Goal trough: [x] 10-15 mcg/mL or [] 15-20 mcg/ml  AUC/RJ: [x] <500 or [] 400-600    Pertinent Laboratory Values:   Temp Readings from Last 3 Encounters:   07/26/22 98 °F (36.7 °C) (Oral)   07/19/22 97.9 °F (36.6 °C) (Temporal)   04/19/22 97 °F (36.1 °C) (Temporal)     Recent Labs     07/25/22  1808   WBC 6.0   LACTATE 0.8     Recent Labs     07/25/22  1808 07/27/22  0140   BUN 26*  --    CREATININE 1.9* 2.0*     Estimated Creatinine Clearance: 44 mL/min (A) (based on SCr of 2 mg/dL (H)). Intake/Output Summary (Last 24 hours) at 7/27/2022 0807  Last data filed at 7/27/2022 0549  Gross per 24 hour   Intake 1370 ml   Output 315 ml   Net 1055 ml       Pertinent Cultures:  Date    Source    Results  07/25   blood    pending  07/25   wound    pending    Vancomycin level:   TROUGH:  No results for input(s): VANCOTROUGH in the last 72 hours. RANDOM:    Recent Labs     07/27/22  0140   VANCORANDOM 18.7       Assessment:  SCr, BUN, and urine output: NANCY on CKD4, SCR elevated @ 2 (baseline around 1.3), UOP appears ok  Day(s) of therapy: 3  PER ID:  Will plan on two weeks of ABX therapy if negative for OM, 6 weeks if positive  Vancomycin concentration:  7/27 - 18.7 ()    Plan:  Continue with vancomycin 1000mg ivpb q24h   Check the vanco level 7/29. Pharmacy will continue to monitor patient and adjust therapy as indicated    VANCOMYCIN CONCENTRATION SCHEDULED FOR 07/29 @06:00     Thank you for the consult.   Delmas Kussmaul, [facility]  7/27/2022 8:07 AM

## 2022-07-27 NOTE — CARE COORDINATION
.CM met with pt for d/c planning. Introduced self and updated white board. Pt lives with his daughter and CARTER and is independent with ADL's. Daughter provides his transportation. He has a PCP, has insurance, and is able to afford his medication. Pt uses O2 prn at home. He has a walker, w/c, cane, shower seat, and a BSC. CM discussed the need of [place] [address] d/t pt will be d/c'd on an IV atb per ID/NP note. Pt is agreeable to [place] [address] and had requested [facility]. CM called [facility] and spoke with Melva. She informed CM that they will not be able to accept pt d/t they don not have any contracts with pharmacies. CM notified pt and he states that he does not have another [place] [phone] preference. Informed him of the hospital affiliation with [address] [facility] and he is agreeable for the referral to be made. He denies any other d/c needs at this time. D/c plan is home with daughter,CARTER & [facility] for IV atb therapy. Referral made to [address] [facility] liaison/Darlene via PS. CM will need to know what IV atb pt will be d/c'd on. Notify CM if any new d/c needs arise.  TE      .Please notify [facility] upon discharge, [phone], they will provide fax number to send [place] [phone] order & AVS.

## 2022-07-27 NOTE — PROGRESS NOTES
GENERAL SURGERY PROGRESS NOTE    Florence Turner is a 70 y.o. male with 1 Day Post-Op L 2nd toe amputation . Subjective:    Pain: minimal 2/2 peripheral neuropathy   Diet: ADULT DIET; Regular; 4 carb choices (60 gm/meal)  Activity: Tolerating    Pt with minimal pain today. Had some bleeding yesterday post-op. Dressing changed, and currently no drainage noted through the dressing. Review of Systems   Constitutional:  Negative for chills and fever. HENT:  Negative for ear pain, mouth sores, sore throat and tinnitus. Eyes:  Negative for photophobia, redness and itching. Respiratory:  Negative for apnea, choking and stridor. Cardiovascular:  Negative for chest pain and palpitations. Gastrointestinal:  Negative for anal bleeding, constipation and rectal pain. Endocrine: Negative for polydipsia. Genitourinary:  Negative for enuresis, flank pain and hematuria. Musculoskeletal:  Positive for arthralgias and gait problem. Negative for back pain, joint swelling and myalgias. Skin:  Positive for wound. Negative for color change and pallor. Allergic/Immunologic: Negative for environmental allergies. Neurological:  Negative for syncope and speech difficulty. Psychiatric/Behavioral:  Negative for confusion and hallucinations. Objective:    Vitals: VITALS:  BP (!) 146/59   Pulse 69   Temp 98 °F (36.7 °C) (Oral)   Resp 10   Ht 6' (1.829 m)   Wt 252 lb 6.8 oz (114.5 kg)   SpO2 100%   BMI 34.24 kg/m²   TEMPERATURE:  Current - Temp: 98 °F (36.7 °C); Max - Temp  Av °F (36.7 °C)  Min: 97.9 °F (36.6 °C)  Max: 98 °F (36.7 °C)    I/O:  0701 -  0700  In: 7838 [P.O.:1000;  I.V.:370]  Out: 315 [Urine:300]    Labs/Imaging Results:   Lab Results   Component Value Date    WBC 6.0 2022    HGB 9.9 (L) 2022    HCT 33.1 (L) 2022    MCV 91.6 2022     2022     Lab Results   Component Value Date     2022    K 4.1 2022    CL 96 (L) Plan:    Patient Active Problem List:     PAD (peripheral artery disease) ([address]     Chronic coronary artery disease     Biventricular ICD (implantable cardioverter-defibrillator) in place     Chronic combined systolic and diastolic heart failure ([facility])     Chronic kidney disease, stage III (moderate) ([facility])     Mixed hyperlipidemia     Sick sinus syndrome ([facility])     Type 2 diabetes mellitus with diabetic polyneuropathy ([address]     Spinal stenosis of lumbar region     Obesity, Class I, BMI 30-34.9     S/P partial colectomy     Tubulovillous adenoma of colon     Microalbuminuria     WD-PVD (peripheral vascular disease) ([facility])     Limb ischemia     Necrotic toes ([facility])     Toe gangrene ([facility])     Diabetic foot infection ([address]     Chronic kidney disease (CKD) stage G3a/A2, moderately decreased glomerular filtration rate (GFR) between 45-59 mL/min/1.73 square meter and albuminuria creatinine ratio between  mg/g ([facility])     Edema     ICD (implantable cardioverter-defibrillator) battery depletion     Hyperkalemia     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[facility])     Ischemia of toe     Acute kidney injury ([address]     Fluid overload     DM (diabetes mellitus) ([facility])     Precordial pain     Acute chest pain     Unstable angina ([facility])     Chronic kidney disease (CKD) stage G3a/A3, moderately decreased glomerular filtration rate (GFR) between 45-59 mL/min/1.73 square meter and albuminuria creatinine ratio greater than 300 mg/g ([facility])     Cardiomyopathy ([facility])     Diabetic neuropathy ([facility])     HTN (hypertension)     Epigastric pain     Acute on chronic congestive heart failure ([facility])     Leg edema     Acute on chronic systolic CHF (congestive heart failure) ([facility])     Diabetic foot ulcer with osteomyelitis ([address]     Visit for wound check     Moderate malnutrition ([address]     Long term (current) use of antibiotics     WD-Diabetic ulcer of toe of right foot associated with type 2 diabetes mellitus, with fat layer exposed ([address]     Diabetic ulcer of toe associated with type 2 diabetes mellitus, with bone involvement without evidence of necrosis ([address]     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     Persistent wound pain     Receiving intravenous antibiotic treatment as outpatient     Acute on chronic respiratory failure with hypoxemia (HCC)     WD-Chronic foot ulcer, left, with necrosis of bone (HCC)     Acute on chronic HFrEF (heart failure with reduced ejection fraction) (HCC)     Scrotal edema     Hypertensive urgency     Diabetic ulcer of right foot due to type 2 diabetes mellitus ([address]     Cellulitis of foot     Toe osteomyelitis ([address]      Diet: Carb controlled as tolerated  Wound care: Wash L foot daily with soap and water. Once dry, apply xeroform, ABD and kerlix wrap. Activity: Wear post-op shoe when ambulating.  HTWB to L foot as possible  Abx: Per ID  Med changes: None   Labs/Imaging: Reviewed  Disposition: Per primary      Selene Gray PA-C

## 2022-07-28 ENCOUNTER — APPOINTMENT (OUTPATIENT)
Dept: GENERAL RADIOLOGY | Age: 72
DRG: 617 | End: 2022-07-28
Payer: MEDICARE

## 2022-07-28 LAB
GLUCOSE BLD-MCNC: 124 MG/DL (ref 70–99)
GLUCOSE BLD-MCNC: 126 MG/DL (ref 70–99)
GLUCOSE BLD-MCNC: 128 MG/DL (ref 70–99)
GLUCOSE BLD-MCNC: 134 MG/DL (ref 70–99)
GLUCOSE BLD-MCNC: 136 MG/DL (ref 70–99)

## 2022-07-28 PROCEDURE — 94640 AIRWAY INHALATION TREATMENT: CPT

## 2022-07-28 PROCEDURE — 74018 RADEX ABDOMEN 1 VIEW: CPT

## 2022-07-28 PROCEDURE — 94761 N-INVAS EAR/PLS OXIMETRY MLT: CPT

## 2022-07-28 PROCEDURE — 2580000003 HC RX 258: Performed by: PHYSICIAN ASSISTANT

## 2022-07-28 PROCEDURE — 99232 SBSQ HOSP IP/OBS MODERATE 35: CPT | Performed by: NURSE PRACTITIONER

## 2022-07-28 PROCEDURE — 82565 ASSAY OF CREATININE: CPT

## 2022-07-28 PROCEDURE — 2700000000 HC OXYGEN THERAPY PER DAY

## 2022-07-28 PROCEDURE — 6360000002 HC RX W HCPCS: Performed by: PHYSICIAN ASSISTANT

## 2022-07-28 PROCEDURE — 6370000000 HC RX 637 (ALT 250 FOR IP): Performed by: PHYSICIAN ASSISTANT

## 2022-07-28 PROCEDURE — 2500000003 HC RX 250 WO HCPCS: Performed by: PHYSICIAN ASSISTANT

## 2022-07-28 PROCEDURE — 82962 GLUCOSE BLOOD TEST: CPT

## 2022-07-28 PROCEDURE — 6370000000 HC RX 637 (ALT 250 FOR IP): Performed by: HOSPITALIST

## 2022-07-28 PROCEDURE — 94664 DEMO&/EVAL PT USE INHALER: CPT

## 2022-07-28 PROCEDURE — 6370000000 HC RX 637 (ALT 250 FOR IP): Performed by: NURSE PRACTITIONER

## 2022-07-28 PROCEDURE — 1200000000 HC SEMI PRIVATE

## 2022-07-28 PROCEDURE — 86141 C-REACTIVE PROTEIN HS: CPT

## 2022-07-28 RX ORDER — LEVOFLOXACIN 500 MG/1
750 TABLET, FILM COATED ORAL EVERY OTHER DAY
Status: DISCONTINUED | OUTPATIENT
Start: 2022-07-28 | End: 2022-07-29 | Stop reason: HOSPADM

## 2022-07-28 RX ORDER — OXYCODONE HYDROCHLORIDE AND ACETAMINOPHEN 5; 325 MG/1; MG/1
1 TABLET ORAL
Status: DISCONTINUED | OUTPATIENT
Start: 2022-07-28 | End: 2022-07-28

## 2022-07-28 RX ORDER — OXYCODONE HYDROCHLORIDE AND ACETAMINOPHEN 5; 325 MG/1; MG/1
1 TABLET ORAL 2 TIMES DAILY PRN
COMMUNITY

## 2022-07-28 RX ORDER — LEVOFLOXACIN 750 MG/1
750 TABLET ORAL EVERY OTHER DAY
Qty: 6 TABLET | Refills: 0 | Status: SHIPPED | OUTPATIENT
Start: 2022-07-30 | End: 2022-08-10

## 2022-07-28 RX ORDER — OXYCODONE HYDROCHLORIDE AND ACETAMINOPHEN 5; 325 MG/1; MG/1
1 TABLET ORAL 2 TIMES DAILY PRN
Status: DISCONTINUED | OUTPATIENT
Start: 2022-07-28 | End: 2022-07-29 | Stop reason: HOSPADM

## 2022-07-28 RX ORDER — METHOCARBAMOL 500 MG/1
750 TABLET, FILM COATED ORAL 4 TIMES DAILY
Status: DISCONTINUED | OUTPATIENT
Start: 2022-07-29 | End: 2022-07-29 | Stop reason: HOSPADM

## 2022-07-28 RX ADMIN — CARVEDILOL 3.12 MG: 6.25 TABLET, FILM COATED ORAL at 09:28

## 2022-07-28 RX ADMIN — CEFEPIME HYDROCHLORIDE 2000 MG: 2 INJECTION, POWDER, FOR SOLUTION INTRAVENOUS at 06:04

## 2022-07-28 RX ADMIN — TIOTROPIUM BROMIDE INHALATION SPRAY 2 PUFF: 3.12 SPRAY, METERED RESPIRATORY (INHALATION) at 07:08

## 2022-07-28 RX ADMIN — ALBUTEROL SULFATE 2 PUFF: 90 AEROSOL, METERED RESPIRATORY (INHALATION) at 07:06

## 2022-07-28 RX ADMIN — HEPARIN SODIUM 5000 UNITS: 5000 INJECTION INTRAVENOUS; SUBCUTANEOUS at 21:26

## 2022-07-28 RX ADMIN — LEVOFLOXACIN 750 MG: 500 TABLET, FILM COATED ORAL at 15:15

## 2022-07-28 RX ADMIN — HEPARIN SODIUM 5000 UNITS: 5000 INJECTION INTRAVENOUS; SUBCUTANEOUS at 15:15

## 2022-07-28 RX ADMIN — ATORVASTATIN CALCIUM 40 MG: 40 TABLET, FILM COATED ORAL at 21:26

## 2022-07-28 RX ADMIN — HEPARIN SODIUM 5000 UNITS: 5000 INJECTION INTRAVENOUS; SUBCUTANEOUS at 06:02

## 2022-07-28 RX ADMIN — MORPHINE SULFATE 4 MG: 4 INJECTION, SOLUTION INTRAMUSCULAR; INTRAVENOUS at 06:03

## 2022-07-28 RX ADMIN — AMLODIPINE BESYLATE 10 MG: 10 TABLET ORAL at 09:28

## 2022-07-28 RX ADMIN — MORPHINE SULFATE 4 MG: 4 INJECTION, SOLUTION INTRAMUSCULAR; INTRAVENOUS at 00:33

## 2022-07-28 RX ADMIN — OXYCODONE AND ACETAMINOPHEN 1 TABLET: 5; 325 TABLET ORAL at 21:26

## 2022-07-28 RX ADMIN — CLOPIDOGREL BISULFATE 75 MG: 75 TABLET ORAL at 09:28

## 2022-07-28 RX ADMIN — METRONIDAZOLE 500 MG: 500 INJECTION, SOLUTION INTRAVENOUS at 00:31

## 2022-07-28 RX ADMIN — METRONIDAZOLE 500 MG: 500 INJECTION, SOLUTION INTRAVENOUS at 06:03

## 2022-07-28 RX ADMIN — METRONIDAZOLE 500 MG: 500 INJECTION, SOLUTION INTRAVENOUS at 11:53

## 2022-07-28 RX ADMIN — ONDANSETRON 4 MG: 2 INJECTION INTRAMUSCULAR; INTRAVENOUS at 00:37

## 2022-07-28 RX ADMIN — SODIUM CHLORIDE, PRESERVATIVE FREE 10 ML: 5 INJECTION INTRAVENOUS at 09:29

## 2022-07-28 RX ADMIN — ONDANSETRON 4 MG: 4 TABLET, ORALLY DISINTEGRATING ORAL at 21:26

## 2022-07-28 ASSESSMENT — PAIN DESCRIPTION - PAIN TYPE: TYPE: CHRONIC PAIN;ACUTE PAIN

## 2022-07-28 ASSESSMENT — PAIN DESCRIPTION - ORIENTATION
ORIENTATION: RIGHT;LEFT
ORIENTATION: RIGHT;LEFT

## 2022-07-28 ASSESSMENT — PAIN SCALES - GENERAL
PAINLEVEL_OUTOF10: 8
PAINLEVEL_OUTOF10: 9
PAINLEVEL_OUTOF10: 3
PAINLEVEL_OUTOF10: 5

## 2022-07-28 ASSESSMENT — PAIN DESCRIPTION - LOCATION
LOCATION: FOOT
LOCATION: GENERALIZED;FOOT

## 2022-07-28 ASSESSMENT — PAIN DESCRIPTION - DESCRIPTORS: DESCRIPTORS: ACHING;BURNING;CRAMPING

## 2022-07-28 NOTE — PROGRESS NOTES
[facility]  consulted by Dr. Vergie Kehr, MD for monitoring and adjustment. Indication for treatment: skin/soft tissue infection  Goal trough: [x] 10-15 mcg/mL or [] 15-20 mcg/ml  AUC/RJ: [x] <500 or [] 400-600    Pertinent Laboratory Values:   Temp Readings from Last 3 Encounters:   07/28/22 98.1 °F (36.7 °C) (Oral)   07/19/22 97.9 °F (36.6 °C) (Temporal)   04/19/22 97 °F (36.1 °C) (Temporal)     Recent Labs     07/25/22  1808   WBC 6.0   LACTATE 0.8     Recent Labs     07/25/22  1808 07/27/22  0140   BUN 26*  --    CREATININE 1.9* 2.0*     Estimated Creatinine Clearance: 44 mL/min (A) (based on SCr of 2 mg/dL (H)). Intake/Output Summary (Last 24 hours) at 7/28/2022 1027  Last data filed at 7/28/2022 0022  Gross per 24 hour   Intake 2808.13 ml   Output 525 ml   Net 2283.13 ml       Pertinent Cultures:  Date    Source    Results  07/25   blood    NGTD  07/25   wound    Pseudomonas, light growth    Vancomycin level:   TROUGH:  No results for input(s): VANCOTROUGH in the last 72 hours. RANDOM:    Recent Labs     07/27/22  0140   VANCORANDOM 18.7       Assessment:  SCr, BUN, and urine output: NANCY on CKD4, SCR elevated @ 2 (baseline around 1.3), UOP appears ok  Day(s) of therapy: 4  PER ID:  Will plan on two weeks of ABX therapy if negative for OM, 6 weeks if positive  Vancomycin concentration:  7/27 - 18.7 ()    Plan:  Continue with vancomycin 1000mg ivpb q24h   Check the vanco level 7/29. Pharmacy will continue to monitor patient and adjust therapy as indicated    VANCOMYCIN CONCENTRATION SCHEDULED FOR 07/29 @06:00     Thank you for the consult.   Jen Fraser [facility]  7/28/2022 10:27 AM

## 2022-07-28 NOTE — ANESTHESIA POSTPROCEDURE EVALUATION
Department of Anesthesiology  Postprocedure Note    Patient: Remy Navarro  MRN: [id number]  YOB: 1950  Date of evaluation: 7/28/2022      Procedure Summary     Date: 07/26/22 Room / Location: [address] [place] [address] OR [address] [place] [address]    Anesthesia Start: 3151 Anesthesia Stop: 1054    Procedure: LEFT SECOND TOE AMPUTATION (Left: Second Toe) Diagnosis:       Infected abrasion of toe of left foot, initial encounter      (Infected abrasion of toe of left foot, initial encounter [S90.415A, L08.9])    Surgeons: Nancy Becerril MD Responsible Provider: Shari Palencia MD    Anesthesia Type: MAC, general ASA Status: 4          Anesthesia Type: No value filed.     Talat Phase I: Talat Score: 8    Talat Phase II:        Anesthesia Post Evaluation    Patient location during evaluation: PACU  Patient participation: complete - patient participated  Level of consciousness: awake  Pain score: 3  Airway patency: patent  Nausea & Vomiting: no vomiting and no nausea  Complications: no  Cardiovascular status: blood pressure returned to baseline and hemodynamically stable  Respiratory status: acceptable  Hydration status: stable

## 2022-07-28 NOTE — CARE COORDINATION
[facility] Liaison spoke with pt and is aware of discharge & will initiate [address]. No atbs needed at dc.

## 2022-07-28 NOTE — DISCHARGE INSTRUCTIONS
Medications: see computerized discharge medication list  Activity: activity as tolerated  Diet: diabetic diet   Wound Care: as directed  Respiratory: continue home oxygen  Disposition: home with home health  Discharged Condition: Stable

## 2022-07-28 NOTE — PROGRESS NOTES
V2.0  [facility] [place]      Name:  Lewis Morillo /Age/Sex: 1950  (70 y.o. male)   MRN & CSN:  [id number] & [id number] Encounter Date/Time: 2022 1:57 PM EDT    Location:  [address] [place] [address] PCP: Shanell Le Day: 4    Assessment and Plan:   Lewis Morillo is a 70 y.o. male who presents with left second toe ulcer with cellulitis      Plan:  Left second toe diabetic foot ulcer (ischemic toe) with cellulitis and s/p left second toe amputation   -left foot x-ray : Extensive bony erosion at head of middle phalanx left second toe; diffuse nonspecific edema about foot. General surgery following. CRP 52. ESR 59. BCx : NGTD. -Wound culture : Pseudomonas aeruginosa. Surgical pathology : Toe with ulcer. IV Vanco Flagyl and cefepime stopped. Patient placed on oral levofloxacin. Okay for DC from ID and general surgery standpoint. Right Foot Wound and Cellulitis-on antibiotics. CKD 3  -creatinine 2.0  DM2  -on sliding scale insulin. A1c 7.7. Chronic respiratory failure  -on 2 L nasal cannula  Nausea and vomiting  -nurse states patient has not eaten anything today and has been vomiting. Check KUB. On antiemetics. HTN  SSS s/p pacer  PVD    Diet ADULT DIET; Regular; 4 carb choices (60 gm/meal)    DVT Prophylaxis [] Lovenox, [x]  Heparin, [] SCDs, [] Ambulation,  [] Eliquis, [] Xarelto  [] Coumadin   Code Status Full Code   Disposition From: home  Expected Disposition: Home with home health care  Estimated Date of Discharge:   Patient requires continued admission due to nausea and vomiting   Home O2 2LNC at night     Subjective/Interval history:   Chief Complaint: Wound Check (Wounds to bilateral feet/toes)     States that his foot is still hurting. Review of Systems:    Negative unless mentioned above    Objective:      Intake/Output Summary (Last 24 hours) at 2022 0956  Last data filed at 2022 0022  Gross per 24 hour   Intake 2808.13 ml   Output 525 ml   Net 2283.13 ml        Vitals:   BP (!) 169/84   Pulse 60   Temp 98.1 °F (36.7 °C) (Oral)   Resp 14   Ht 6' (1.829 m)   Wt 252 lb 6.8 oz (114.5 kg)   SpO2 96%   BMI 34.24 kg/m²     Physical Exam:   General: NAD, laying in bed  Eyes: no discharge  HENT: NCAT  Cardiovascular: RRR  Respiratory: Clear to auscultation b/l bs+, on [facility]  Gastrointestinal: Soft, non tender, nondistended  Genitourinary: no suprapubic tenderness  Musculoskeletal: legs are edematous, left foot is wrapped  Skin: has wound on right toe  Neuro: Alert. No gross deficits  Psych: Mood appropriate.      Medications:   Medications:    clopidogrel  75 mg Oral Daily    atorvastatin  40 mg Oral Nightly    carvedilol  3.125 mg Oral BID WC    metaxalone  800 mg Oral TID    amLODIPine  10 mg Oral Daily    tiotropium  2 puff Inhalation Daily    sodium chloride flush  5-40 mL IntraVENous 2 times per day    heparin (porcine)  5,000 Units SubCUTAneous 3 times per day    metroNIDAZOLE  500 mg IntraVENous Q6H    cefepime  2,000 mg IntraVENous Q12H    vancomycin  1,000 mg IntraVENous Q24H    insulin lispro  0-4 Units SubCUTAneous TID WC    insulin lispro  0-4 Units SubCUTAneous Nightly      Infusions:    sodium chloride Stopped (07/27/22 1934)    dextrose       PRN Meds: albuterol sulfate HFA, 2 puff, Q4H PRN  sodium chloride flush, 5-40 mL, PRN  sodium chloride, 25 mL, PRN  ondansetron, 4 mg, Q8H PRN   Or  ondansetron, 4 mg, Q6H PRN  polyethylene glycol, 17 g, Daily PRN  acetaminophen, 650 mg, Q6H PRN   Or  acetaminophen, 650 mg, Q6H PRN  morphine, 4 mg, Q4H PRN  glucose, 4 tablet, PRN  dextrose bolus, 125 mL, PRN   Or  dextrose bolus, 250 mL, PRN  glucagon (rDNA), 1 mg, PRN  dextrose, , Continuous PRN  hydrALAZINE, 10 mg, Q4H PRN      Labs      Recent Labs     07/25/22  1808 07/26/22 1803   WBC 6.0  --    HGB 10.4* 9.9*   HCT 32.8* 33.1*     --       Recent Labs     07/25/22  1808 07/27/22  0140     --    K 4.1  --

## 2022-07-28 NOTE — PROGRESS NOTES
Infectious Disease Progress Note  2022   Patient Name: Helen Bernstein : 1950   Impression:  Left DFO with Cellulitis, Right Foot Cellulitis:  Afebrile, no leukocytosis  -BC 0/2-NGTD  -Right foot XR: 1. No acute osseous abnormality evident. No definite findings for   osteomyelitis. 2. Nonspecific edema about the foot may reflect cellulitis. 3. Bones appear osteoporotic. Follow-up imaging recommended if pain persists or worsens following   conservative management. -XR Foot Left: 1. There appears to be extensive bone erosion at the head of the middle   phalanx left 2nd toe versus prior surgical intervention, new compared to   prior study concerning for osteomyelitis. 2. No other definite radiographic features of osteomyelitis demonstrated. 3. Bones are osteoporotic. 4. Diffuse nonspecific edema about the foot may reflect cellulitis. -S/p per Dr. Diomedes Mota: left second toe amputation. DX:  infected abrasion of toe of left foot. Cultures: Pseudomonas aeruginosa, Path report: final report: left second toe amputation, toe with an ulcer. (No residual OM noted)  DMII:  -HbAIC 7.7  CKD3  Obesity: BMI: 31.99  HTN  CAD/ PAD  HF  Chronic Normocytic Anemia  CIED Placement   Allergy to PCN (Hives)  Multi-morbidity: per PMHx     Plan:  DC IV Flagyl, cefepime and vancomycin  Start po Levoquin 750 mg q48h x 7 doses  Follow up with PCP and general surgery  OK from ID standpoint to DC when ready    Ongoing Antimicrobial Therapy  Cefepime   Metronidazole   Vancomycin   Completed Antimicrobial Therapy  ? History:? Interval history noted. Chief complaint: Left DFU with cellulitis, right foot cellulitis. Denies n/v/d/f or untoward effects of antibiotics. States is feeling better, pain continues in bilateral feet, but has somewhat improved.   Physical Exam:  Vital Signs: BP (!) 140/53   Pulse 64   Temp 97.3 °F (36.3 °C) (Oral)   Resp 16   Ht 6' (1.829 m)   Wt 252 lb 6.8 oz (114.5 kg)   SpO2 93%   BMI 34.24 kg/m²     Gen: A&O x 4, resting quietly, no distress  Wounds: C/D/I left foot with surrounding erythema and warmth. Right foot past amputations of 2nd and 4th digits, no edema. Chest: no distress and CTA. Good air movement. Heart: RRR and no MRG. Abd: soft, non-distended, no tenderness, no hepatomegaly. Normoactive bowel sounds. Ext: no clubbing, cyanosis, see wounds above. Neuro: Mental status intact. CN 2-12 intact and no focal sensory or motor deficits     Radiologic / Imaging / TESTING  7/25/22 XR Foot Right:  Impression   1. No acute osseous abnormality evident. No definite findings for   osteomyelitis. 2. Nonspecific edema about the foot may reflect cellulitis. 3. Bones appear osteoporotic. Follow-up imaging recommended if pain persists or worsens following   conservative management. 7/25/22 XR Foot Left:  Impression   1. There appears to be extensive bone erosion at the head of the middle   phalanx left 2nd toe versus prior surgical intervention, new compared to   prior study concerning for osteomyelitis. 2. No other definite radiographic features of osteomyelitis demonstrated. 3. Bones are osteoporotic. 4. Diffuse nonspecific edema about the foot may reflect cellulitis.         Labs:    Recent Results (from the past 24 hour(s))   POCT Glucose    Collection Time: 07/27/22  3:28 PM   Result Value Ref Range    POC Glucose 207 (H) 70 - 99 MG/DL   POCT Glucose    Collection Time: 07/27/22  8:14 PM   Result Value Ref Range    POC Glucose 175 (H) 70 - 99 MG/DL   POCT Glucose    Collection Time: 07/28/22  7:50 AM   Result Value Ref Range    POC Glucose 126 (H) 70 - 99 MG/DL   POCT Glucose    Collection Time: 07/28/22 11:39 AM   Result Value Ref Range    POC Glucose 136 (H) 70 - 99 MG/DL     CULTURE results: Invalid input(s): BLOOD CULTURE,  URINE CULTURE, SURGICAL CULTURE    Diagnosis:  Patient Active Problem List   Diagnosis    PAD (peripheral artery disease) ([address]    Chronic coronary artery disease    Biventricular ICD (implantable cardioverter-defibrillator) in place    Chronic combined systolic and diastolic heart failure (HCC)    Chronic kidney disease, stage III (moderate) (HCC)    Mixed hyperlipidemia    Sick sinus syndrome (HCC)    Type 2 diabetes mellitus with diabetic polyneuropathy ([facility])    Spinal stenosis of lumbar region    Obesity, Class I, BMI 30-34.9    S/P partial colectomy    Tubulovillous adenoma of colon    Microalbuminuria    WD-PVD (peripheral vascular disease) ([facility])    Limb ischemia    Necrotic toes ([facility])    Toe gangrene (HCC)    Diabetic foot infection ([facility])    Chronic kidney disease (CKD) stage G3a/A2, moderately decreased glomerular filtration rate (GFR) between 45-59 mL/min/1.73 square meter and albuminuria creatinine ratio between  mg/g ([facility])    Edema    ICD (implantable cardioverter-defibrillator) battery depletion    Hyperkalemia    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[facility])    Ischemia of toe    Acute kidney injury ([address]    Fluid overload    DM (diabetes mellitus) ([facility])    Precordial pain    Acute chest pain    Unstable angina ([facility])    Chronic kidney disease (CKD) stage G3a/A3, moderately decreased glomerular filtration rate (GFR) between 45-59 mL/min/1.73 square meter and albuminuria creatinine ratio greater than 300 mg/g (HCC)    Cardiomyopathy (HCC)    Diabetic neuropathy (HCC)    HTN (hypertension)    Epigastric pain    Acute on chronic congestive heart failure (HCC)    Leg edema    Acute on chronic systolic CHF (congestive heart failure) (HCC)    Diabetic foot ulcer with osteomyelitis ([address]    Visit for wound check    Moderate malnutrition ([address]    Long term (current) use of antibiotics    WD-Diabetic ulcer of toe of right foot associated with type 2 diabetes mellitus, with fat layer exposed ([address]    Diabetic ulcer of toe associated with type 2 diabetes mellitus, with bone involvement without evidence of necrosis ([address]    Infestation by ran    Persistent wound pain    Receiving intravenous antibiotic treatment as outpatient    Acute on chronic respiratory failure with hypoxemia (HCC)    WD-Chronic foot ulcer, left, with necrosis of bone (HCC)    Acute on chronic HFrEF (heart failure with reduced ejection fraction) (HCC)    Scrotal edema    Hypertensive urgency    Diabetic ulcer of right foot due to type 2 diabetes mellitus ([address]    Cellulitis of foot    Toe osteomyelitis ([address]       Active Problems  Principal Problem:    Diabetic ulcer of right foot due to type 2 diabetes mellitus (HCC)  Active Problems:    Cellulitis of foot    Toe osteomyelitis (HCC)    Diabetic ulcer of toe associated with type 2 diabetes mellitus, with bone involvement without evidence of necrosis ([address]  Resolved Problems:    * No resolved hospital problems. *    Electronically signed by: Electronically signed by MICKY Mcknight CNP on 7/28/2022 at 12:26 PM

## 2022-07-28 NOTE — CARE COORDINATION
Pt has been cleared by ID and will be d/c'd on a PO atb per ID note, notified Dr Jonna Lind via PS.   TE

## 2022-07-28 NOTE — CARE COORDINATION
[facility] PS to Dr Aw[facility] HOPPER[facility] asking when she thinks that pt may be medically ready for d/c from a surgical standpoint.   TE

## 2022-07-29 VITALS
TEMPERATURE: 98.1 F | RESPIRATION RATE: 17 BRPM | BODY MASS INDEX: 34.19 KG/M2 | HEIGHT: 72 IN | WEIGHT: 252.43 LBS | DIASTOLIC BLOOD PRESSURE: 99 MMHG | SYSTOLIC BLOOD PRESSURE: 177 MMHG | OXYGEN SATURATION: 98 % | HEART RATE: 60 BPM

## 2022-07-29 LAB
ANION GAP SERPL CALCULATED.3IONS-SCNC: 8 MMOL/L (ref 4–16)
BASOPHILS ABSOLUTE: 0 K/CU MM
BASOPHILS RELATIVE PERCENT: 0.5 % (ref 0–1)
BUN BLDV-MCNC: 20 MG/DL (ref 6–23)
CALCIUM SERPL-MCNC: 8.3 MG/DL (ref 8.3–10.6)
CHLORIDE BLD-SCNC: 100 MMOL/L (ref 99–110)
CO2: 33 MMOL/L (ref 21–32)
CREAT SERPL-MCNC: 1.9 MG/DL (ref 0.9–1.3)
DIFFERENTIAL TYPE: ABNORMAL
EOSINOPHILS ABSOLUTE: 0.1 K/CU MM
EOSINOPHILS RELATIVE PERCENT: 2.2 % (ref 0–3)
GFR AFRICAN AMERICAN: 42 ML/MIN/1.73M2
GFR NON-AFRICAN AMERICAN: 35 ML/MIN/1.73M2
GLUCOSE BLD-MCNC: 101 MG/DL (ref 70–99)
GLUCOSE BLD-MCNC: 104 MG/DL (ref 70–99)
GLUCOSE BLD-MCNC: 108 MG/DL (ref 70–99)
GLUCOSE BLD-MCNC: 121 MG/DL (ref 70–99)
HCT VFR BLD CALC: 32.1 % (ref 42–52)
HEMOGLOBIN: 9.7 GM/DL (ref 13.5–18)
HIGH SENSITIVE C-REACTIVE PROTEIN: 61.3 MG/L
IMMATURE NEUTROPHIL %: 0.3 % (ref 0–0.43)
LYMPHOCYTES ABSOLUTE: 0.7 K/CU MM
LYMPHOCYTES RELATIVE PERCENT: 11 % (ref 24–44)
MCH RBC QN AUTO: 28.4 PG (ref 27–31)
MCHC RBC AUTO-ENTMCNC: 30.2 % (ref 32–36)
MCV RBC AUTO: 93.9 FL (ref 78–100)
MONOCYTES ABSOLUTE: 0.4 K/CU MM
MONOCYTES RELATIVE PERCENT: 6.5 % (ref 0–4)
NUCLEATED RBC %: 0 %
PDW BLD-RTO: 14.3 % (ref 11.7–14.9)
PLATELET # BLD: 164 K/CU MM (ref 140–440)
PMV BLD AUTO: 10.2 FL (ref 7.5–11.1)
POTASSIUM SERPL-SCNC: 4 MMOL/L (ref 3.5–5.1)
RBC # BLD: 3.42 M/CU MM (ref 4.6–6.2)
SEGMENTED NEUTROPHILS ABSOLUTE COUNT: 5.1 K/CU MM
SEGMENTED NEUTROPHILS RELATIVE PERCENT: 79.5 % (ref 36–66)
SODIUM BLD-SCNC: 141 MMOL/L (ref 135–145)
TOTAL IMMATURE NEUTOROPHIL: 0.02 K/CU MM
TOTAL NUCLEATED RBC: 0 K/CU MM
WBC # BLD: 6.4 K/CU MM (ref 4–10.5)

## 2022-07-29 PROCEDURE — 6370000000 HC RX 637 (ALT 250 FOR IP): Performed by: PHYSICIAN ASSISTANT

## 2022-07-29 PROCEDURE — 6370000000 HC RX 637 (ALT 250 FOR IP): Performed by: INTERNAL MEDICINE

## 2022-07-29 PROCEDURE — 94640 AIRWAY INHALATION TREATMENT: CPT

## 2022-07-29 PROCEDURE — 85025 COMPLETE CBC W/AUTO DIFF WBC: CPT

## 2022-07-29 PROCEDURE — 97530 THERAPEUTIC ACTIVITIES: CPT

## 2022-07-29 PROCEDURE — 99231 SBSQ HOSP IP/OBS SF/LOW 25: CPT | Performed by: NURSE PRACTITIONER

## 2022-07-29 PROCEDURE — 36415 COLL VENOUS BLD VENIPUNCTURE: CPT

## 2022-07-29 PROCEDURE — 6360000002 HC RX W HCPCS: Performed by: PHYSICIAN ASSISTANT

## 2022-07-29 PROCEDURE — 97162 PT EVAL MOD COMPLEX 30 MIN: CPT

## 2022-07-29 PROCEDURE — 97542 WHEELCHAIR MNGMENT TRAINING: CPT

## 2022-07-29 PROCEDURE — 82962 GLUCOSE BLOOD TEST: CPT

## 2022-07-29 PROCEDURE — 97535 SELF CARE MNGMENT TRAINING: CPT

## 2022-07-29 PROCEDURE — 80048 BASIC METABOLIC PNL TOTAL CA: CPT

## 2022-07-29 PROCEDURE — 2700000000 HC OXYGEN THERAPY PER DAY

## 2022-07-29 PROCEDURE — 97166 OT EVAL MOD COMPLEX 45 MIN: CPT

## 2022-07-29 PROCEDURE — 6370000000 HC RX 637 (ALT 250 FOR IP): Performed by: HOSPITALIST

## 2022-07-29 PROCEDURE — 86141 C-REACTIVE PROTEIN HS: CPT

## 2022-07-29 PROCEDURE — 94761 N-INVAS EAR/PLS OXIMETRY MLT: CPT

## 2022-07-29 RX ORDER — HYDRALAZINE HYDROCHLORIDE 25 MG/1
25 TABLET, FILM COATED ORAL ONCE
Status: COMPLETED | OUTPATIENT
Start: 2022-07-29 | End: 2022-07-29

## 2022-07-29 RX ADMIN — TIOTROPIUM BROMIDE INHALATION SPRAY 2 PUFF: 3.12 SPRAY, METERED RESPIRATORY (INHALATION) at 08:04

## 2022-07-29 RX ADMIN — HEPARIN SODIUM 5000 UNITS: 5000 INJECTION INTRAVENOUS; SUBCUTANEOUS at 14:53

## 2022-07-29 RX ADMIN — OXYCODONE AND ACETAMINOPHEN 1 TABLET: 5; 325 TABLET ORAL at 03:43

## 2022-07-29 RX ADMIN — POLYETHYLENE GLYCOL (3350) 17 G: 17 POWDER, FOR SOLUTION ORAL at 09:00

## 2022-07-29 RX ADMIN — HYDRALAZINE HYDROCHLORIDE 25 MG: 25 TABLET, FILM COATED ORAL at 03:43

## 2022-07-29 RX ADMIN — CLOPIDOGREL BISULFATE 75 MG: 75 TABLET ORAL at 09:00

## 2022-07-29 RX ADMIN — CARVEDILOL 3.12 MG: 6.25 TABLET, FILM COATED ORAL at 09:01

## 2022-07-29 RX ADMIN — AMLODIPINE BESYLATE 10 MG: 10 TABLET ORAL at 09:01

## 2022-07-29 RX ADMIN — METHOCARBAMOL 750 MG: 500 TABLET ORAL at 00:13

## 2022-07-29 RX ADMIN — METHOCARBAMOL 750 MG: 500 TABLET ORAL at 14:53

## 2022-07-29 RX ADMIN — METHOCARBAMOL 750 MG: 500 TABLET ORAL at 09:02

## 2022-07-29 RX ADMIN — HEPARIN SODIUM 5000 UNITS: 5000 INJECTION INTRAVENOUS; SUBCUTANEOUS at 05:36

## 2022-07-29 ASSESSMENT — PAIN DESCRIPTION - DESCRIPTORS
DESCRIPTORS: STABBING
DESCRIPTORS: STABBING
DESCRIPTORS: ACHING;DISCOMFORT
DESCRIPTORS: SHOOTING

## 2022-07-29 ASSESSMENT — PAIN DESCRIPTION - ONSET: ONSET: ON-GOING

## 2022-07-29 ASSESSMENT — PAIN DESCRIPTION - LOCATION
LOCATION: FOOT
LOCATION: TOE (COMMENT WHICH ONE);LEG
LOCATION: TOE (COMMENT WHICH ONE)
LOCATION: GENERALIZED
LOCATION: TOE (COMMENT WHICH ONE)

## 2022-07-29 ASSESSMENT — PAIN DESCRIPTION - ORIENTATION
ORIENTATION: LEFT
ORIENTATION: LEFT

## 2022-07-29 ASSESSMENT — PAIN SCALES - GENERAL
PAINLEVEL_OUTOF10: 5
PAINLEVEL_OUTOF10: 8
PAINLEVEL_OUTOF10: 6
PAINLEVEL_OUTOF10: 0
PAINLEVEL_OUTOF10: 6

## 2022-07-29 ASSESSMENT — PAIN - FUNCTIONAL ASSESSMENT: PAIN_FUNCTIONAL_ASSESSMENT: PREVENTS OR INTERFERES WITH ALL ACTIVE AND SOME PASSIVE ACTIVITIES

## 2022-07-29 ASSESSMENT — PAIN DESCRIPTION - PAIN TYPE: TYPE: SURGICAL PAIN;ACUTE PAIN

## 2022-07-29 ASSESSMENT — PAIN DESCRIPTION - FREQUENCY: FREQUENCY: CONTINUOUS

## 2022-07-29 NOTE — PROGRESS NOTES
Infectious Disease Progress Note  2022   Patient Name: Tegan Adamson : 1950   Impression:  Left DFO with Cellulitis, Right Foot Cellulitis:  Afebrile, no leukocytosis  -BC 0/2-NGTD  -Right foot XR: 1. No acute osseous abnormality evident. No definite findings for   osteomyelitis. 2. Nonspecific edema about the foot may reflect cellulitis. 3. Bones appear osteoporotic. Follow-up imaging recommended if pain persists or worsens following   conservative management. -XR Foot Left: 1. There appears to be extensive bone erosion at the head of the middle   phalanx left 2nd toe versus prior surgical intervention, new compared to   prior study concerning for osteomyelitis. 2. No other definite radiographic features of osteomyelitis demonstrated. 3. Bones are osteoporotic. 4. Diffuse nonspecific edema about the foot may reflect cellulitis. -S/p per Dr. Arthur Arzate: left second toe amputation. DX:  infected abrasion of toe of left foot. Cultures: Pseudomonas aeruginosa, Path report: final report: left second toe amputation, toe with an ulcer. (No residual OM noted)  DMII:  -HbAIC 7.7  CKD3  Obesity: BMI: 31.99  HTN  CAD/ PAD  HF  Chronic Normocytic Anemia  CIED Placement   Allergy to PCN (Hives)  Multi-morbidity: per PMHx     Plan:  Continue po Levoquin 750 mg q48h x 7 doses (end date 22)  Follow up with PCP and general surgery  OK from ID standpoint to DC when ready    Ongoing Antimicrobial Therapy  Levaquin -  Completed Antimicrobial Therapy  Cefepime   Metronidazole   Vancomycin ? History:? Interval history noted. Chief complaint: Left DFU with cellulitis, right foot cellulitis. Denies n/v/d/f or untoward effects of antibiotics. States is feeling better, pain continues in bilateral feet, but has somewhat improved, no new changes.    Physical Exam:  Vital Signs: BP (!) 177/99   Pulse 60   Temp 98.1 °F (36.7 °C) (Oral)   Resp 17   Ht 6' (1.829 m) Wt 252 lb 6.8 oz (114.5 kg)   SpO2 98%   BMI 34.24 kg/m²     Gen: somnolent in bed. Wounds: C/D/I left foot with surrounding erythema and warmth. Right foot past amputations of 2nd and 4th digits, no edema. Chest: no distress and CTA. Good air movement. Heart: RRR and no MRG. Abd: soft, non-distended, no tenderness, no hepatomegaly. Normoactive bowel sounds. Ext: no clubbing, cyanosis, see wounds above. Neuro: Mental status intact. CN 2-12 intact and no focal sensory or motor deficits     Radiologic / Imaging / TESTING  7/25/22 XR Foot Right:  Impression   1. No acute osseous abnormality evident. No definite findings for   osteomyelitis. 2. Nonspecific edema about the foot may reflect cellulitis. 3. Bones appear osteoporotic. Follow-up imaging recommended if pain persists or worsens following   conservative management. 7/25/22 XR Foot Left:  Impression   1. There appears to be extensive bone erosion at the head of the middle   phalanx left 2nd toe versus prior surgical intervention, new compared to   prior study concerning for osteomyelitis. 2. No other definite radiographic features of osteomyelitis demonstrated. 3. Bones are osteoporotic. 4. Diffuse nonspecific edema about the foot may reflect cellulitis. 7/28/22 KUB:  Impression   No radiopaque urinary collecting system calculus evident. Unremarkable bowel gas pattern.      Labs:    Recent Results (from the past 24 hour(s))   POCT Glucose    Collection Time: 07/28/22  1:48 PM   Result Value Ref Range    POC Glucose 134 (H) 70 - 99 MG/DL   POCT Glucose    Collection Time: 07/28/22  5:08 PM   Result Value Ref Range    POC Glucose 128 (H) 70 - 99 MG/DL   POCT Glucose    Collection Time: 07/28/22  9:15 PM   Result Value Ref Range    POC Glucose 124 (H) 70 - 99 MG/DL   C-Reactive Protein    Collection Time: 07/29/22  5:01 AM   Result Value Ref Range    CRP, High Sensitivity 61.3 mg/L   Basic Metabolic Panel    Collection Time: 07/29/22  5:01 AM   Result Value Ref Range    Sodium 141 135 - 145 MMOL/L    Potassium 4.0 3.5 - 5.1 MMOL/L    Chloride 100 99 - 110 mMol/L    CO2 33 (H) 21 - 32 MMOL/L    Anion Gap 8 4 - 16    BUN 20 6 - 23 MG/DL    Creatinine 1.9 (H) 0.9 - 1.3 MG/DL    Glucose 101 (H) 70 - 99 MG/DL    Calcium 8.3 8.3 - 10.6 MG/DL    GFR Non-African American 35 (L) >60 mL/min/1.73m2    GFR  42 (L) >60 mL/min/1.73m2   CBC with Auto Differential    Collection Time: 07/29/22  5:01 AM   Result Value Ref Range    WBC 6.4 4.0 - 10.5 K/CU MM    RBC 3.42 (L) 4.6 - 6.2 M/CU MM    Hemoglobin 9.7 (L) 13.5 - 18.0 GM/DL    Hematocrit 32.1 (L) 42 - 52 %    MCV 93.9 78 - 100 FL    MCH 28.4 27 - 31 PG    MCHC 30.2 (L) 32.0 - 36.0 %    RDW 14.3 11.7 - 14.9 %    Platelets 649 869 - 083 K/CU MM    MPV 10.2 7.5 - 11.1 FL    Differential Type AUTOMATED DIFFERENTIAL     Segs Relative 79.5 (H) 36 - 66 %    Lymphocytes % 11.0 (L) 24 - 44 %    Monocytes % 6.5 (H) 0 - 4 %    Eosinophils % 2.2 0 - 3 %    Basophils % 0.5 0 - 1 %    Segs Absolute 5.1 K/CU MM    Lymphocytes Absolute 0.7 K/CU MM    Monocytes Absolute 0.4 K/CU MM    Eosinophils Absolute 0.1 K/CU MM    Basophils Absolute 0.0 K/CU MM    Nucleated RBC % 0.0 %    Total Nucleated RBC 0.0 K/CU MM    Total Immature Neutrophil 0.02 K/CU MM    Immature Neutrophil % 0.3 0 - 0.43 %   POCT Glucose    Collection Time: 07/29/22  6:40 AM   Result Value Ref Range    POC Glucose 108 (H) 70 - 99 MG/DL   POCT Glucose    Collection Time: 07/29/22  7:34 AM   Result Value Ref Range    POC Glucose 104 (H) 70 - 99 MG/DL   POCT Glucose    Collection Time: 07/29/22 11:11 AM   Result Value Ref Range    POC Glucose 121 (H) 70 - 99 MG/DL     CULTURE results: Invalid input(s): BLOOD CULTURE,  URINE CULTURE, SURGICAL CULTURE    Diagnosis:  Patient Active Problem List   Diagnosis    PAD (peripheral artery disease) (HCC)    Chronic coronary artery disease    Biventricular ICD (implantable cardioverter-defibrillator) in place    Chronic combined systolic and diastolic heart failure (HCC)    Chronic kidney disease, stage III (moderate) (HCC)    Mixed hyperlipidemia    Sick sinus syndrome (HCC)    Type 2 diabetes mellitus with diabetic polyneuropathy (HCC)    Spinal stenosis of lumbar region    Obesity, Class I, BMI 30-34.9    S/P partial colectomy    Tubulovillous adenoma of colon    Microalbuminuria    WD-PVD (peripheral vascular disease) ([facility])    Limb ischemia    Necrotic toes (HCC)    Toe gangrene (HCC)    Diabetic foot infection (HCC)    Chronic kidney disease (CKD) stage G3a/A2, moderately decreased glomerular filtration rate (GFR) between 45-59 mL/min/1.73 square meter and albuminuria creatinine ratio between  mg/g ([facility])    Edema    ICD (implantable cardioverter-defibrillator) battery depletion    Hyperkalemia    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[facility])    Ischemia of toe    Acute kidney injury ([address]    Fluid overload    DM (diabetes mellitus) ([facility])    Precordial pain    Acute chest pain    Unstable angina ([facility])    Chronic kidney disease (CKD) stage G3a/A3, moderately decreased glomerular filtration rate (GFR) between 45-59 mL/min/1.73 square meter and albuminuria creatinine ratio greater than 300 mg/g (HCC)    Cardiomyopathy (HCC)    Diabetic neuropathy (HCC)    HTN (hypertension)    Epigastric pain    Acute on chronic congestive heart failure (HCC)    Leg edema    Acute on chronic systolic CHF (congestive heart failure) ([facility])    Diabetic foot ulcer with osteomyelitis ([address]    Visit for wound check    Moderate malnutrition ([address]    Long term (current) use of antibiotics    WD-Diabetic ulcer of toe of right foot associated with type 2 diabetes mellitus, with fat layer exposed ([address]    Diabetic ulcer of toe associated with type 2 diabetes mellitus, with bone involvement without evidence of necrosis ([address]    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    Persistent wound pain    Receiving intravenous antibiotic treatment as outpatient Acute on chronic respiratory failure with hypoxemia (HCC)    WD-Chronic foot ulcer, left, with necrosis of bone (HCC)    Acute on chronic HFrEF (heart failure with reduced ejection fraction) (HCC)    Scrotal edema    Hypertensive urgency    Diabetic ulcer of right foot due to type 2 diabetes mellitus ([address]    Cellulitis of foot    Toe osteomyelitis ([address]       Active Problems  Principal Problem:    Diabetic ulcer of right foot due to type 2 diabetes mellitus (HCC)  Active Problems:    Cellulitis of foot    Toe osteomyelitis (HCC)    Diabetic ulcer of toe associated with type 2 diabetes mellitus, with bone involvement without evidence of necrosis ([address]  Resolved Problems:    * No resolved hospital problems. *    Electronically signed by: Electronically signed by Julian Nguyen.  MICKY Louise CNP on 7/29/2022 at 12:33 PM

## 2022-07-29 NOTE — CARE COORDINATION
[facility] Liaison aware of discharge & will initiate [facility].

## 2022-07-29 NOTE — PROGRESS NOTES
[facility] ACUTE CARE OCCUPATIONAL THERAPY EVALUATION    Jose Mack, 1950, 3103/3103-A, 7/29/2022    Discharge Recommendation: Home w/ assist prn + Health OT services      History:  [place]ski:  The primary encounter diagnosis was Diabetic ulcer of toe associated with type 2 diabetes mellitus, with bone involvement without evidence of necrosis, unspecified laterality ([facility] Diagnoses of Toe osteomyelitis ([address] Cellulitis of foot, and Infected abrasion of toe of left foot, initial encounter were also pertinent to this visit. Past Medical History:   Diagnosis Date    Acid reflux     Acute MI ([facility] 2004, 2008    Arthritis     Back    Broken teeth     Upper Front    CAD (coronary artery disease)     Sees Dr. Marquez Roles [facility])     per old chart    CHF (congestive heart failure) ([facility]     Chronic back pain     Chronic kidney disease     STAGE 3 KIDNEY FAILURE- \"from my diabetes- do not follow with any one- have seen Dr Elizabeth Coats in the past\"    Diabetes mellitus [facility]) Dx 1965    per old chart pt has been diabetic since age 13    Diabetic neuropathy ([facility]     \"in my feet\"    H/O cardiovascular stress test 08/25/2016    H/O Doppler ultrasound 09/27/2018    Moderate disease of the right lower extremity with an JALEN of 0.72. Moderate to severe disease of the left lower extremity with an JALEN of 0.55.     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    Neuropathy     both feet    neuropathy     PAD (peripheral artery disease) ([facility] 09/27/2018    PVD (peripheral vascular disease) ([facility]     Sick sinus syndrome [facility])     Sleep apnea     \"sleep study 3 yrs ago- could not tolerate the cpap made me too dry\"    Spinal stenosis     Teeth missing     Upper And Lower    Type 2 diabetes mellitus without complication ([facility]     WD-Chronic foot ulcer, left, with necrosis of bone ([facility] 11/12/2021       Subjective:  Patient states: \"I'm going home\"  Pain:  6/10 pain in L foot  Communication with other providers: co-eval w/ PT, handoff to RN  Restrictions: General Precautions, Fall Risk, L LE WBAT, contact precautions    Home Setup/Prior level of function:    Social/Functional History  Lives With: Daughter (and grandchildren)  Type of Home: House  Home Layout: Two level (Uses one story for him)  Home Access: Stairs to enter with rails,Ramped entrance  [address] [facility] - Number of Steps: 8 in front and ramp to back door  Entrance Stairs - Rails: Right  Bathroom Shower/Tub: Walk-in shower (Can drive hover round into shower)  Bathroom Toilet: Standard  Bathroom Equipment: Tub transfer bench  Bathroom Accessibility: [facility]: Wheelchair and hoverround  Receives Help From: Home health,Family (states bathed and dressed self. Has HH to do housekeeping chores)  ADL Assistance: Independent  Homemaking Assistance: Needs assistance  Meal Prep: Moderate  Homemaking Responsibilities: No  Ambulation Assistance:  (Unable to stand on feet at this time.  Gets around in w/c and hover round at home.)  Active : No  Occupation: Retired  Type of occupation: Trained truck drivers  Leisure & Hobbies: Likes to chart maps for fun  Comments: Currently unable to use hover round d/t flat tire    Examination:  Observation: Supine in bed upon arrival, agreeable to therapy eval.  Vision: WFL  Hearing: WFL  Vitals: Stable vitals throughout session on room air      [address] and functions:  ROM: WFL   Strength: B UE grossly 4+/5 across all major joints   Sensation: WFL  Tone: Normal  Coordination: WFL  Perception: WNL      Cognitive and Psychosocial Functioning:  Overall cognitive status: alert and oriented, WFL  Affect: Normal       Functional Mobility:  Bed mobility:  supine to sitting EOB w/ SBA  Sitting balance:  SBA static and dynamic sitting unsupported EOB  Transfers: STS from EOB w/ SBA, SPT from bed to w/c and from w/c to recliner w/ SBA  Functional Mobility: propelled w/c long functional household  distance w/ SBA  Toilet/Shower Transfers: CGA SPT from w/c to/from standard toilet        Activities of Daily Living (ADLs):  Feeding: set up A while seated  Grooming: SBA to use mouthwash, wash hands, and wash face while seated in w/c at sink  UB bathing: SBA  LB bathing: SBA  UB dressing: SBA  LB dressing: min A to don post op shoe L LE  Toileting: SBA hygiene and clothing mgmt     *ADL determined per observation of functional mobility, balance, activity tolerance, cognition, or actual ADL performance. AM-PAC 6 click short form for inpatient daily activity:   How much help from another person does the patient currently need. .. Unable  Dep A Lot  Max A A Lot   Mod A A Little  Min A A Little   CGA  SBA None   Mod I  Indep  Sup   1. Putting on and taking off regular lower body clothing? [] 1    [] 2   [] 2   [x] 3   [] 3   [] 4      2. Bathing (including washing, rinsing, drying)? [] 1   [] 2   [] 2 [] 3 [x] 3 [] 4   3. Toileting, which includes using toilet, bedpan, or urinal? [] 1    [] 2   [] 2   [] 3   [x] 3   [] 4     4. Putting on and taking off regular upper body clothing? [] 1   [] 2   [] 2   [] 3   [x] 3    [] 4      5. Taking care of personal grooming such as brushing teeth? [] 1   [] 2    [] 2 [] 3    [x] 3   [] 4      6. Eating meals?    [] 1   [] 2   [] 2   [] 3   [] 3   [x] 4        Raw Score:  19     [24=0% impaired(CH), 23=1-19%(CI), 20-22=20-39%(CJ), 15-19=40-59%(CK), 10-14=60-79%(CL), 7-9=80-99%(CM), 6=100%(CN)]     Treatment:    Self Care Training:   Cues were given for safety, sequence, UE/LE placement, visual cues, and balance. Activities performed today included dressing, toileting, hand hygiene, and grooming/oral care. Educated pt on role of OT, therapy POC and functional goals, progression w/ ADLs and transfers, importance of movement and OOB activity, d/c recommendations     Safety Measures: Gait belt used, Left in recliner, Alarm in place  Recommendations for NURSING activity:  Up to chair for all 3 meals and up to bathroom for all toileting needs     Assessment:  Pt is a 70 y o M admitted d/t diabetic ulcer of L foot, 7/26 L second toe amputation. Pt at baseline is IND for ADLs, has assist for high level IADLs, and mod I for SPT using w/c for all mobility. Pt currently presents w/ deficits in ADL and high level IADL independence, functional ADL transfers, strength, and functional activity tolerance. Continued OT services recommended to increase safety and independence with ADL routine and to address remaining functional deficits. Pt would benefit from continued acute care OT services w/ discharge to home w/ assist prn + HH OT. Complexity: Moderate  Prognosis: Good, no significant barriers to participation at this time. Plan  Times per Week: 2  Current Treatment Recommendations: Strengthening, ROM, Functional mobility training, Endurance training, Wheelchair mobility training, Cognitive reorientation, Pain management, Patient/Caregiver education & training, Equipment evaluation, education, & procurement, Positioning, Self-Care / ADL, Cognitive/Perceptual training         Goals:  Pt will complete all aspects of bed mobility for EOB/OOB ADLs w/ mod I. Pt will complete UB ADLs w/ mod I. Pt will complete LB ADLs w/ mod I. Pt will complete all functional transfers to and from bed, chair, toilet, shower chair w/ mod I. Pt will complete all aspects of toileting task w/ mod I.   Pt will perform therex/theract in order to increase strength and functional activity tolerance necessary for increased independence w/ ADL routine.     Pt goal: go home, get stronger  Time Frame for STGs: discharge    Equipment: Continue to assess at next LOC    Time:   Time in: 0945  Time out: 1022  Total time: 37  Timed treatment minutes: 27      Electronically signed by:      Christen Lanes, OTR/LAURA  [id number]

## 2022-07-30 LAB
CULTURE: NORMAL
CULTURE: NORMAL
Lab: NORMAL
Lab: NORMAL
SPECIMEN: NORMAL
SPECIMEN: NORMAL

## 2022-07-31 LAB
CULTURE: ABNORMAL
CULTURE: ABNORMAL
Lab: ABNORMAL
SPECIMEN: ABNORMAL

## 2022-08-04 ENCOUNTER — OFFICE VISIT (OUTPATIENT)
Dept: SURGERY | Age: 72
End: 2022-08-04

## 2022-08-04 VITALS
SYSTOLIC BLOOD PRESSURE: 130 MMHG | DIASTOLIC BLOOD PRESSURE: 70 MMHG | BODY MASS INDEX: 33.32 KG/M2 | HEART RATE: 63 BPM | OXYGEN SATURATION: 96 % | HEIGHT: 72 IN | WEIGHT: 246 LBS

## 2022-08-04 DIAGNOSIS — Z09 POSTOPERATIVE EXAMINATION: Primary | ICD-10-CM

## 2022-08-04 PROCEDURE — 99024 POSTOP FOLLOW-UP VISIT: CPT | Performed by: SURGERY

## 2022-08-04 ASSESSMENT — PATIENT HEALTH QUESTIONNAIRE - PHQ9
SUM OF ALL RESPONSES TO PHQ QUESTIONS 1-9: 0
SUM OF ALL RESPONSES TO PHQ9 QUESTIONS 1 & 2: 0
1. LITTLE INTEREST OR PLEASURE IN DOING THINGS: 0
SUM OF ALL RESPONSES TO PHQ QUESTIONS 1-9: 0
2. FEELING DOWN, DEPRESSED OR HOPELESS: 0
SUM OF ALL RESPONSES TO PHQ QUESTIONS 1-9: 0
SUM OF ALL RESPONSES TO PHQ QUESTIONS 1-9: 0

## 2022-08-09 ENCOUNTER — HOSPITAL ENCOUNTER (OUTPATIENT)
Dept: WOUND CARE | Age: 72
Discharge: HOME | End: 2022-08-09
Payer: MEDICARE

## 2022-08-09 VITALS
RESPIRATION RATE: 18 BRPM | HEART RATE: 60 BPM | SYSTOLIC BLOOD PRESSURE: 195 MMHG | DIASTOLIC BLOOD PRESSURE: 79 MMHG | TEMPERATURE: 98.8 F

## 2022-08-09 PROCEDURE — 11042 DBRDMT SUBQ TIS 1ST 20SQCM/<: CPT

## 2022-08-09 NOTE — PROGRESS NOTES
Chiquis Waterman DPM    [facility] WD-Chronic foot ulcer, left, with necrosis of bone (HCC) +2 more    Dx Wound Check ; Referred by MICKY Monzon - CNP    Reason for Visit         Progress Notes  Chiquis Waterman DPM (Physician)   [address] [facility] Progress Note With Procedure     Shikha Noriega  AGE: 70 y.o. GENDER: male  : 1950  EPISODE DATE:  2022      Subjective:           Chief Complaint   Patient presents with    Wound Check       asa ft         HISTORY of PRESENT ILLNESS       Shikha Noriega is a 70 y.o. male who presents today for wound evaluation of both feet. Did go to the hospital as I directed him on the last visit. General surgery was consulted on his admission and a partial second toe amputation to the left foot was performed about 2 weeks ago. Patient says he is doing well overall. Pain levels have improved. Says that he follows up with that surgeon next week to look at surgical site and other wounds. Denies any calf pain chest pain shortness of breath difficulty breathing. Denies any constitutional symptoms.                                         PAST MEDICAL HISTORY     Past Medical History             Diagnosis Date    Acid reflux      Acute MI ([address] ,     Arthritis       Back    Broken teeth       Upper Front    CAD (coronary artery disease)       Sees Dr. Antwon Roberts [facility])       per old chart    CHF (congestive heart failure) ([facility]      Chronic back pain      Chronic kidney disease       STAGE 3 KIDNEY FAILURE- \"from my diabetes- do not follow with any one- have seen Dr Henna King in the past\"    Diabetes mellitus [facility]) Dx 1965     per old chart pt has been diabetic since age 13    Diabetic neuropathy ([facility]       \"in my feet\"    H/O cardiovascular stress test 2016    H/O Doppler ultrasound 2018     Moderate disease of the right lower extremity with an JALEN of 0.72. Moderate to severe disease of the left lower extremity with an JALEN of 0.55.     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    Neuropathy       both feet    neuropathy      PAD (peripheral artery disease) ([address] 09/27/2018    PVD (peripheral vascular disease) ([address]      Sick sinus syndrome (HCC)      Sleep apnea       \"sleep study 3 yrs ago- could not tolerate the cpap made me too dry\"    Spinal stenosis      Teeth missing       Upper And Lower    Type 2 diabetes mellitus without complication ([address]      WD-Chronic foot ulcer, left, with necrosis of bone ([address] 11/12/2021            PAST SURGICAL HISTORY     Past Surgical History         Past Surgical History:   Procedure Laterality Date    CARDIAC CATHETERIZATION         per old chart done 10/2014    CARDIAC CATHETERIZATION   07/14/2017     with angiography of leg    CARDIAC CATHETERIZATION   11/20/2018     PATENT STENTS OF ALL THREE MAJOR VESSELS    CARDIAC DEFIBRILLATOR PLACEMENT   06/04/2010     [employer] [address] DR Defibrillator Implanted    COLECTOMY Right 08/26/2016     laparascopic; robotic assisted    COLONOSCOPY   08/04/2016    CORONARY ANGIOPLASTY         \"15 Heart Stents\"    CORONARY ANGIOPLASTY WITH STENT PLACEMENT         per old chart had angio with stent to circumflex and obtuse marginal artery at [facility] 5/2010( old chart also gives hx of stent placement done 2000,2004 and 2005)    DENTAL SURGERY         Teeth Extracted In Past    IR TUNNELED CATHETER PLACEMENT GREATER THAN 5 YEARS   6/14/2021     IR TUNNELED CATHETER PLACEMENT GREATER THAN 5 YEARS 2021 [address] [facility] SPECIAL PROCEDURES    PACEMAKER PLACEMENT   2010     [employer]onic [address] Defibrillator Implanted    TOE AMPUTATION Right 2017     Rt 3rd toe    TOE AMPUTATION Right 2018      Right 5th toe amputation and Toenails trimmed left 2,3,4 and 5th toes    TOE AMPUTATION Left 2020     LEFT GREAT TOE AMPUTATION performed by Pardeep Joshi MD at [address]         per old chart had balloon angioplasty right superfical femoral artery,right popliteal artery,,right ant.tibial artery, right tibioperoneal trunk, and right post.tibial artery wna stent placement right popliteal artery and superfical femoral artery 2012            FAMILY HISTORY     Family History         Family History   Problem Relation Age of Onset    Diabetes Mother      Stroke Mother      High Blood Pressure Mother      Vision Loss Mother      Cancer Father           Prostate Cancer    Diabetes Sister      Neuropathy Sister      Other Sister           \"Breathing Problems\"    Heart Disease Sister      Early Death Sister 62         Heart Complications    Cancer Brother           \"Stomach Cancer\"    High Blood Pressure Brother      Diabetes Brother      Heart Disease Brother      High Blood Pressure Brother      Cancer Son           \"Testicle Cancer\"            SOCIAL HISTORY     Social History            Tobacco Use    Smoking status: Former Smoker       Packs/day: 0.25       Years: 36.00       Pack years: 9.00       Types: Cigars       Start date: 1980       Quit date: 10/22/2021       Years since quittin.4    Smokeless tobacco: Never Used    Tobacco comment: Client states he has stopped smoking   Vaping Use    Vaping Use: Never used   Substance Use Topics    Alcohol use: No    Drug use: Yes       Types: Marijuana (Weed)         ALLERGIES          Allergies   Allergen Reactions    Pcn [Penicillins] Hives    Fentanyl Itching         MEDICATIONS            Current care.  Dermatologic exam: Visual inspection of the periwound reveals the skin to be moist.  Surgical site to the left foot is stable with sutures intact. Some slight dehiscence is noted but no signs of active infection. Wound exam: see wound description below in procedure note  Musculoskeletal: Bilateral calves are soft and supple upon manual compression. Assessment:           Problem List Items Addressed This Visit           WD-Diabetic ulcer of toe of right foot associated with type 2 diabetes mellitus, with fat layer exposed ([address]      Relevant Orders      Initiate Outpatient Wound Care Protocol      XR FOOT RIGHT (MIN 3 VIEWS)      VL ANKLE ART BRACHIAL INDICES EXTREMITY BILATERAL      WD-Diabetic ulcer of toe of left foot associated with type 2 diabetes mellitus, with fat layer exposed ([address]      Relevant Orders      Initiate Outpatient Wound Care Protocol      XR FOOT LEFT (MIN 3 VIEWS)      VL ANKLE ART BRACHIAL INDICES EXTREMITY BILATERAL      WD-Chronic foot ulcer, left, with necrosis of bone (HCC) - Primary      Relevant Orders      Initiate Outpatient Wound Care Protocol      XR FOOT LEFT (MIN 3 VIEWS)      VL ANKLE ART BRACHIAL INDICES EXTREMITY BILATERAL           Procedure Note     Indications:  Based on my examination of this patient's wound(s) today, sharp excision into necrotic subcutaneous tissue is required to promote healing and evaluate the extent of previous healing. Performed by: Saniya Glover DPM     Consent obtained: Yes     Time out taken:  Yes     Pain Control: lidocaine        Debridement:Non-excisional Debridement     Using curette the wound(s) was/were sharply debrided down through and including the removal of subcutaneous tissue and bone.          Devitalized Tissue Debrided:  slough     Pre Debridement Measurements:  Are located in the Wound Documentation Flow Sheet     All active wounds listed below with today's date are evaluated  Wound(s)    debrided this date include # : 2      Wound 07/06/17 Other (Comment) Toe (Comment  which one) (Active)   Number of days: 1859       Wound 12/10/20 Foot Left;Lateral fluid filled blister (Active)   Number of days: 606       Wound 10/30/21   #1 Left Dorsal Great Toe amp site  (Active)   Number of days: 283       Wound 10/30/21   #2 Left Dorsal Second Toe  (Active)   Number of days: 283       Wound 10/30/21 #3 Right Great Toe (Active)   Number of days: 283       Wound 02/25/22 #7 Right Dorsal 2nd Toe (Active)   Number of days: 164       Wound 07/19/22 Toe (Comment  which one) Anterior;Right #1 GR (Active)   Wound Image   07/19/22 1325   Dressing Status New dressing applied 08/09/22 1325   Wound Cleansed Wound cleanser 08/09/22 1254   Offloading for Diabetic Foot Ulcers Post op shoe 07/19/22 1423   Wound Length (cm) 1 cm 08/09/22 1254   Wound Width (cm) 1.2 cm 08/09/22 1254   Wound Depth (cm) 0.1 cm 08/09/22 1254   Wound Surface Area (cm^2) 1.2 cm^2 08/09/22 1254   Change in Wound Size % (l*w) 61.04 08/09/22 1254   Wound Volume (cm^3) 0.12 cm^3 08/09/22 1254   Wound Healing % 87 08/09/22 1254   Post-Procedure Length (cm) 1 cm 08/09/22 1322   Post-Procedure Width (cm) 1.2 cm 08/09/22 1322   Post-Procedure Depth (cm) 0.1 cm 08/09/22 1322   Post-Procedure Surface Area (cm^2) 1.2 cm^2 08/09/22 1322   Post-Procedure Volume (cm^3) 0.12 cm^3 08/09/22 1322   Distance Tunneling (cm) 0 cm 08/09/22 1254   Tunneling Position ___ O'Clock 0 08/09/22 1254   Undermining Starts ___ O'Clock 0 08/09/22 1254   Undermining Ends___ O'Clock 00 08/09/22 1254   Undermining Maxium Distance (cm) 0 08/09/22 1254   Wound Assessment Pink/red 08/09/22 1254   Drainage Amount Moderate 08/09/22 1254   Drainage Description Yellow 08/09/22 1254   Odor None 08/09/22 1254   Dina-wound Assessment Dry/flaky 08/09/22 1254   Margins Defined edges 08/09/22 1254   Wound Thickness Description not for Pressure Injury Full thickness 08/09/22 1254   Number of days: 21       Wound 07/19/22 Toe (Comment  which one) Anterior; Left #2 GR (Active)   Wound Image   07/19/22 1325   Dressing Status New dressing applied 08/09/22 1325   Wound Cleansed Soap and water 08/09/22 1254   Offloading for Diabetic Foot Ulcers Post op shoe 07/19/22 1423   Wound Length (cm) 0.3 cm 08/09/22 1254   Wound Width (cm) 1.2 cm 08/09/22 1254   Wound Depth (cm) 0.1 cm 08/09/22 1254   Wound Surface Area (cm^2) 0.36 cm^2 08/09/22 1254   Change in Wound Size % (l*w) 91.11 08/09/22 1254   Wound Volume (cm^3) 0.036 cm^3 08/09/22 1254   Wound Healing % 91 08/09/22 1254   Post-Procedure Length (cm) 0.3 cm 08/09/22 1322   Post-Procedure Width (cm) 1.2 cm 08/09/22 1322   Post-Procedure Depth (cm) 0.1 cm 08/09/22 1322   Post-Procedure Surface Area (cm^2) 0.36 cm^2 08/09/22 1322   Post-Procedure Volume (cm^3) 0.036 cm^3 08/09/22 1322   Distance Tunneling (cm) 0 cm 08/09/22 1254   Tunneling Position ___ O'Clock 0 08/09/22 1254   Undermining Starts ___ O'Clock 0 08/09/22 1254   Undermining Ends___ O'Clock 0 08/09/22 1254   Undermining Maxium Distance (cm) 0 08/09/22 1254   Wound Assessment Pink/red 08/09/22 1254   Drainage Amount Moderate 08/09/22 1254   Drainage Description Yellow 08/09/22 1254   Odor None 08/09/22 1254   Dina-wound Assessment Dry/flaky 08/09/22 1254   Margins Defined edges 08/09/22 1254   Wound Thickness Description not for Pressure Injury Full thickness 08/09/22 1254   Number of days: 21       Wound 07/19/22 Toe (Comment  which one) Anterior; Left #3 2ND (Active)   Wound Image   07/19/22 1325   Dressing Status New dressing applied 08/09/22 1325   Wound Cleansed Soap and water 08/09/22 1254   Offloading for Diabetic Foot Ulcers Post op shoe 07/19/22 1423   Wound Length (cm) 3 cm 08/09/22 1254   Wound Width (cm) 3 cm 08/09/22 1254   Wound Depth (cm) 0.1 cm 08/09/22 1254   Wound Surface Area (cm^2) 9 cm^2 08/09/22 1254   Change in Wound Size % (l*w) -20.32 08/09/22 1254   Wound Volume (cm^3) 0.9 cm^3 08/09/22 1254   Wound Healing % 60 08/09/22 1254   Post-Procedure Length (cm) 3 cm 08/09/22 1322   Post-Procedure Width (cm) 3 cm 08/09/22 1322   Post-Procedure Depth (cm) 0.1 cm 08/09/22 1322   Post-Procedure Surface Area (cm^2) 9 cm^2 08/09/22 1322   Post-Procedure Volume (cm^3) 0.9 cm^3 08/09/22 1322   Distance Tunneling (cm) 0 cm 08/09/22 1254   Tunneling Position ___ O'Clock 0 08/09/22 1254   Undermining Starts ___ O'Clock 0 08/09/22 1254   Undermining Ends___ O'Clock 0 08/09/22 1254   Undermining Maxium Distance (cm) 0 08/09/22 1254   Wound Assessment Exposed structure bone;Pink/red;Slough 07/19/22 1325   Drainage Amount Moderate 08/09/22 1254   Drainage Description Serosanguinous 08/09/22 1254   Odor None 08/09/22 1254   Dina-wound Assessment Dry/flaky 08/09/22 1254   Margins Defined edges 07/19/22 1325   Wound Thickness Description not for Pressure Injury Full thickness 07/19/22 1325   Number of days: 21              Percent of Wound(s) Debrided: approximately 100%     Total  Area  Debrided:  10.56 sq cm     Bleeding:  Minimal     Hemostasis Achieved:  by pressure     Procedural Pain:  2  / 10     Post Procedural Pain:  2 / 10     Response to treatment:  Well tolerated by patient. Status of wound progress and description from last visit:   stable. Plan:         Discharge Instructions        PHYSICIAN ORDERS AND DISCHARGE INSTRUCTIONS     NOTE: Upon discharge from the [address], you will receive a patient experience survey. We would be grateful if you would take the time to fill this survey out.      Wound care order history:                    Vascular studies: 6/8/2021 Date 11/12/2021              Imaging:  XRAY ordered for left foot 4/19/2022              Cultures: Left 2nd toe bone obtained on 11/12/2021              Grafts:  Date              Antibiotics:              Earlier Wound care treatments:              Primary care physician:     Continuing wound care orders and information:              Residence: Continue home health care with:  Does not remember           Wound Medications:              Wound cleansing:                          Do not scrub or use excessive force. Wash hands with soap and water before and after dressing changes. Prior to applying a clean dressing, cleanse wound with normal saline,                                wound cleanser, or mild soap and water. Ask the physician or nurse before getting the wound(s) wet in a shower              Daily Wound management:                          Keep weight off wounds and reposition every 2 hours. Avoid standing for long periods of time. Apply wraps/stockings in AM and remove at bedtime. If swelling is present, elevate legs to the level of the heart or above for 30                                                         minutes 4-5 times a day and/or when sitting. When taking antibiotics take entire prescription as ordered by physician                                                         do not stop taking until medicine is all gone. Wound Care Notes:            Given surgical shoes 21      Orders for this week: 2022      Right 2nd toe-- keep clean and dry. Monitor for drainage      Right great toe, Left great toe, Left 2nd toe -- wash with soap,and water, pat dry. Moisturize both legs and feet with A+D. Apply Gentamicin and stimulin powder to wound bed and cover with silver alginate and qwick. Wrap with conform and tape. Change daily        Gave surgical shoes 21   Arterial referral on  2021 to [facility]        Follow up with Dr Melany Hercules in 1 week in the wound care center. Call [phone] for any questions or concerns.   Date__________   Time____________  Central [phone]        -Patient was seen, evaluated, and treated today  -Conservative and surgical treatment options discussed in detail with the patient today  -Left foot second toe partial amputation by general surgery about 2 weeks ago  -Plan to follow-up with this provider next week  -No signs of active infection today from I&D. -Continue same wound care plan to the bilateral lower extremities.  -Continue to ambulate in surgical shoe on the left foot  -Follow-up 1 week with general surgeon in 2 weeks with me in the wound care center.

## 2022-08-09 NOTE — DISCHARGE INSTRUCTIONS
PHYSICIAN ORDERS AND DISCHARGE INSTRUCTIONS     NOTE: Upon discharge from the [address], you will receive a patient experience survey. We would be grateful if you would take the time to fill this survey out. Wound care order history:                    Vascular studies: 6/8/2021 Date 11/12/2021              Imaging:  XRAY ordered for left foot 4/19/2022              Cultures: Left 2nd toe bone obtained on 11/12/2021              Grafts:  Date              Antibiotics:              Earlier Wound care treatments:              Primary care physician:     Continuing wound care orders and information:              Residence:                Continue home health care with:  Does not remember           Wound Medications:              Wound cleansing:                          Do not scrub or use excessive force. Wash hands with soap and water before and after dressing changes. Prior to applying a clean dressing, cleanse wound with normal saline,                                wound cleanser, or mild soap and water. Ask the physician or nurse before getting the wound(s) wet in a shower              Daily Wound management:                          Keep weight off wounds and reposition every 2 hours. Avoid standing for long periods of time. Apply wraps/stockings in AM and remove at bedtime. If swelling is present, elevate legs to the level of the heart or above for 30                                                         minutes 4-5 times a day and/or when sitting. When taking antibiotics take entire prescription as ordered by physician                                                         do not stop taking until medicine is all gone.           Wound Care Notes:            Given surgical shoes 11/22/21         Orders for this

## 2022-08-22 ENCOUNTER — OFFICE VISIT (OUTPATIENT)
Dept: SURGERY | Age: 72
End: 2022-08-22

## 2022-08-22 VITALS
HEIGHT: 72 IN | WEIGHT: 246 LBS | SYSTOLIC BLOOD PRESSURE: 136 MMHG | HEART RATE: 62 BPM | DIASTOLIC BLOOD PRESSURE: 80 MMHG | OXYGEN SATURATION: 93 % | BODY MASS INDEX: 33.32 KG/M2

## 2022-08-22 DIAGNOSIS — Z09 POSTOPERATIVE EXAMINATION: Primary | ICD-10-CM

## 2022-08-22 PROCEDURE — 99024 POSTOP FOLLOW-UP VISIT: CPT | Performed by: SURGERY

## 2022-08-22 ASSESSMENT — PATIENT HEALTH QUESTIONNAIRE - PHQ9
2. FEELING DOWN, DEPRESSED OR HOPELESS: 0
SUM OF ALL RESPONSES TO PHQ QUESTIONS 1-9: 0
SUM OF ALL RESPONSES TO PHQ QUESTIONS 1-9: 0
1. LITTLE INTEREST OR PLEASURE IN DOING THINGS: 0
SUM OF ALL RESPONSES TO PHQ9 QUESTIONS 1 & 2: 0
SUM OF ALL RESPONSES TO PHQ QUESTIONS 1-9: 0
SUM OF ALL RESPONSES TO PHQ QUESTIONS 1-9: 0

## 2022-08-23 ENCOUNTER — HOSPITAL ENCOUNTER (OUTPATIENT)
Dept: WOUND CARE | Age: 72
Discharge: HOME | End: 2022-08-23
Payer: MEDICARE

## 2022-08-23 VITALS — RESPIRATION RATE: 20 BRPM | HEART RATE: 74 BPM | TEMPERATURE: 97.2 F

## 2022-08-23 PROCEDURE — 11042 DBRDMT SUBQ TIS 1ST 20SQCM/<: CPT

## 2022-08-23 RX ORDER — GINSENG 100 MG
CAPSULE ORAL ONCE
OUTPATIENT
Start: 2022-08-23 | End: 2022-08-23

## 2022-08-23 RX ORDER — LIDOCAINE HYDROCHLORIDE 40 MG/ML
SOLUTION TOPICAL ONCE
OUTPATIENT
Start: 2022-08-23 | End: 2022-08-23

## 2022-08-23 RX ORDER — BETAMETHASONE DIPROPIONATE 0.05 %
OINTMENT (GRAM) TOPICAL ONCE
OUTPATIENT
Start: 2022-08-23 | End: 2022-08-23

## 2022-08-23 RX ORDER — BACITRACIN, NEOMYCIN, POLYMYXIN B 400; 3.5; 5 [USP'U]/G; MG/G; [USP'U]/G
OINTMENT TOPICAL ONCE
OUTPATIENT
Start: 2022-08-23 | End: 2022-08-23

## 2022-08-23 RX ORDER — LIDOCAINE 40 MG/G
CREAM TOPICAL ONCE
OUTPATIENT
Start: 2022-08-23 | End: 2022-08-23

## 2022-08-23 RX ORDER — LIDOCAINE 50 MG/G
OINTMENT TOPICAL ONCE
OUTPATIENT
Start: 2022-08-23 | End: 2022-08-23

## 2022-08-23 RX ORDER — LIDOCAINE HYDROCHLORIDE 20 MG/ML
JELLY TOPICAL ONCE
OUTPATIENT
Start: 2022-08-23 | End: 2022-08-23

## 2022-08-23 RX ORDER — BACITRACIN ZINC AND POLYMYXIN B SULFATE 500; 1000 [USP'U]/G; [USP'U]/G
OINTMENT TOPICAL ONCE
OUTPATIENT
Start: 2022-08-23 | End: 2022-08-23

## 2022-08-23 RX ORDER — CLOBETASOL PROPIONATE 0.5 MG/G
OINTMENT TOPICAL ONCE
OUTPATIENT
Start: 2022-08-23 | End: 2022-08-23

## 2022-08-23 RX ORDER — GENTAMICIN SULFATE 1 MG/G
OINTMENT TOPICAL ONCE
OUTPATIENT
Start: 2022-08-23 | End: 2022-08-23

## 2022-08-23 NOTE — PROGRESS NOTES
Rafiq Mcarthur DPM    P[facility]AT[facility] FOOT [facility] ANKLE SURGERY WD-Chronic foot ulcer, left, with necrosis of bone (HCC) +2 more    Dx Wound Check ; Referred by MICKY Cook - CNP    Reason for Visit         Progress Notes  Rafiq Mcarthur DPM (Physician)   [facility] Progress Note With Procedure     Criss Hanna  AGE: 70 y.o. GENDER: male  : 1950  EPISODE DATE:  2022      Subjective:           Chief Complaint   Patient presents with    Wound Check       asa ft         HISTORY of PRESENT ILLNESS       Criss Hanna is a 70 y.o. male who presents today for wound evaluation of both feet. Second toe amputation left foot by general surgery. Says he went to see them yesterday and the sutures were removed. Denies any worsening pain to either foot today. Denies any calf pain chest pain shortness of breath difficulty breathing. Denies any constitutional symptoms. PAST MEDICAL HISTORY     Past Medical History             Diagnosis Date    Acid reflux      Acute MI ([address] ,     Arthritis       Back    Broken teeth       Upper Front    CAD (coronary artery disease)       Sees Dr. Yolande Mancia [facility])       per old chart    CHF (congestive heart failure) ([address]      Chronic back pain      Chronic kidney disease       STAGE 3 KIDNEY FAILURE- \"from my diabetes- do not follow with any one- have seen Dr Moriah Dodge in the past\"    Diabetes mellitus ([address] Dx 1965     per old chart pt has been diabetic since age 13    Diabetic neuropathy ([address]       \"in my feet\"    H/O cardiovascular stress test 2016    H/O Doppler ultrasound 2018     Moderate disease of the right lower extremity with an JALEN of 0.72. Moderate to severe disease of the left lower extremity with an JALEN of 0.55.     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    Neuropathy       both feet    neuropathy      PAD (peripheral artery disease) ([address] 09/27/2018    PVD (peripheral vascular disease) ([address]      Sick sinus syndrome (HCC)      Sleep apnea       \"sleep study 3 yrs ago- could not tolerate the cpap made me too dry\"    Spinal stenosis      Teeth missing       Upper And Lower    Type 2 diabetes mellitus without complication ([address]      WD-Chronic foot ulcer, left, with necrosis of bone ([address] 11/12/2021            PAST SURGICAL HISTORY     Past Surgical History         Past Surgical History:   Procedure Laterality Date    CARDIAC CATHETERIZATION         per old chart done 10/2014    CARDIAC CATHETERIZATION   07/14/2017     with angiography of leg    CARDIAC CATHETERIZATION   11/20/2018     PATENT STENTS OF ALL THREE MAJOR VESSELS    CARDIAC DEFIBRILLATOR PLACEMENT   06/04/2010     [employer] [address] DR Defibrillator Implanted    COLECTOMY Right 08/26/2016     laparascopic; robotic assisted    COLONOSCOPY   08/04/2016    CORONARY ANGIOPLASTY         \"15 Heart Stents\"    CORONARY ANGIOPLASTY WITH STENT PLACEMENT         per old chart had angio with stent to circumflex and obtuse marginal artery at [facility] 5/2010( old chart also gives hx of stent placement done 2000,2004 and 2005)    DENTAL SURGERY         Teeth Extracted In Past    IR TUNNELED CATHETER PLACEMENT GREATER THAN 5 YEARS   6/14/2021     IR TUNNELED CATHETER PLACEMENT GREATER THAN 5 YEARS [facility]    PACEMAKER PLACEMENT   06/04/2010     [employer] [address] DR Defibrillator Implanted    TOE AMPUTATION Right 09/12/2017     Rt 3rd toe    TOE AMPUTATION Right 2018      Right 5th toe amputation and Toenails trimmed left 2,3,4 and 5th toes    TOE AMPUTATION Left 2020     LEFT GREAT TOE AMPUTATION performed by Harman Mckinnon MD at [address]         per old chart had balloon angioplasty right superfical femoral artery,right popliteal artery,,right ant.tibial artery, right tibioperoneal trunk, and right post.tibial artery wna stent placement right popliteal artery and superfical femoral artery 2012            FAMILY HISTORY     Family History         Family History   Problem Relation Age of Onset    Diabetes Mother      Stroke Mother      High Blood Pressure Mother      Vision Loss Mother      Cancer Father           Prostate Cancer    Diabetes Sister      Neuropathy Sister      Other Sister           \"Breathing Problems\"    Heart Disease Sister      Early Death Sister 62         Heart Complications    Cancer Brother           \"Stomach Cancer\"    High Blood Pressure Brother      Diabetes Brother      Heart Disease Brother      High Blood Pressure Brother      Cancer Son           \"Testicle Cancer\"            SOCIAL HISTORY     Social History            Tobacco Use    Smoking status: Former Smoker       Packs/day: 0.25       Years: 36.00       Pack years: 9.00       Types: Cigars       Start date: 1980       Quit date: 10/22/2021       Years since quittin.4    Smokeless tobacco: Never Used    Tobacco comment: Client states he has stopped smoking   Vaping Use    Vaping Use: Never used   Substance Use Topics    Alcohol use: No    Drug use: Yes       Types: Marijuana (Weed)         ALLERGIES          Allergies   Allergen Reactions    Pcn [Penicillins] Hives    Fentanyl Itching         MEDICATIONS            Current Outpatient Medications on File Prior to Encounter   Medication Sig Dispense Refill    amLODIPine (NORVASC) 10 MG tablet TAKE 1 TABLET BY MOUTH EVERY DAY 90 tablet 3    lisinopril (PRINIVIL;ZESTRIL) 5 MG tablet Take 1 tablet by mouth daily 30 tablet 0    metOLazone (ZAROXOLYN) 2.5 MG tablet Take 1 tablet by mouth daily 30 tablet 0    torsemide (DEMADEX) 20 MG tablet Take 2 tablets by mouth daily 60 tablet 0    diclofenac sodium (VOLTAREN) 1 % GEL Apply 4 g topically 2 times daily 120 g 5    gabapentin (NEURONTIN) 300 MG capsule TAKE 1 CAPSULE BY MOUTH 3 TIMES DAILY FOR 90 DAYS. 90 capsule 3    metaxalone (SKELAXIN) 800 MG tablet Take 800 mg by mouth 3 times daily        SPIRIVA HANDIHALER 18 MCG inhalation capsule          insulin lispro, 1 Unit Dial, (HUMALOG KWIKPEN) 100 UNIT/ML SOPN Inject into the skin 2 times daily Sliding scale dose        atorvastatin (LIPITOR) 40 MG tablet Take 1 tablet by mouth nightly 30 tablet 3    carvedilol (COREG) 3.125 MG tablet Take 1 tablet by mouth 2 times daily 60 tablet 3    albuterol sulfate HFA (VENTOLIN HFA) 108 (90 Base) MCG/ACT inhaler Inhale 2 puffs into the lungs every 4 hours as needed for Wheezing 1 Inhaler 3    Calcium Alginate (ALGICELL CALCIUM DRESSING 4\"X8) MISC Apply 1 Device topically daily 30 each 3    PROMOGRAN COREY WOUND DRESSING MATRIX Apply topically Apply to left daily and cover with gauze 1 Package 3    clopidogrel (PLAVIX) 75 MG tablet Take 1 tablet by mouth daily 30 tablet 11      No current facility-administered medications on file prior to encounter. REVIEW OF SYSTEMS     Pertinent items are noted in HPI. Constitutional: Negative for systemic symptoms including fever, chills and malaise. Objective:       BP (!) 148/73   Pulse 67   Temp 97 °F (36.1 °C) (Temporal)   Resp 16      PHYSICAL EXAM     General: The patient is in no acute distress. Mental status:  Patient is appropriate, is  oriented to place and plan of care. Dermatologic exam: Visual inspection of the periwound reveals the skin to be moist.  Surgical site to the left foot is stable with sutures intact.   Some slight dehiscence is noted but no signs of active #1 Left Dorsal Great Toe amp site  (Active)   Number of days: 297       Wound 10/30/21   #2 Left Dorsal Second Toe  (Active)   Number of days: 297       Wound 10/30/21 #3 Right Great Toe (Active)   Number of days: 297       Wound 02/25/22 #7 Right Dorsal 2nd Toe (Active)   Number of days: 178       Wound 07/19/22 Toe (Comment  which one) Anterior;Right #1 GR (Active)   Wound Image   08/23/22 1242   Dressing Status New dressing applied 08/23/22 1302   Wound Cleansed Wound cleanser 08/23/22 1242   Offloading for Diabetic Foot Ulcers Post op shoe 07/19/22 1423   Wound Length (cm) 0.8 cm 08/23/22 1242   Wound Width (cm) 1 cm 08/23/22 1242   Wound Depth (cm) 0.1 cm 08/23/22 1242   Wound Surface Area (cm^2) 0.8 cm^2 08/23/22 1242   Change in Wound Size % (l*w) 74.03 08/23/22 1242   Wound Volume (cm^3) 0.08 cm^3 08/23/22 1242   Wound Healing % 91 08/23/22 1242   Post-Procedure Length (cm) 0.8 cm 08/23/22 1254   Post-Procedure Width (cm) 1 cm 08/23/22 1254   Post-Procedure Depth (cm) 0.1 cm 08/23/22 1254   Post-Procedure Surface Area (cm^2) 0.8 cm^2 08/23/22 1254   Post-Procedure Volume (cm^3) 0.08 cm^3 08/23/22 1254   Distance Tunneling (cm) 0 cm 08/23/22 1242   Tunneling Position ___ O'Clock 0 08/23/22 1242   Undermining Starts ___ O'Clock 0 08/23/22 1242   Undermining Ends___ O'Clock 0 08/23/22 1242   Undermining Maxium Distance (cm) 0 08/23/22 1242   Wound Assessment Pink/red 08/23/22 1242   Drainage Amount Small 08/23/22 1242   Drainage Description Yellow 08/23/22 1242   Odor None 08/23/22 1242   Dina-wound Assessment Hyperkeratosis (callous) 08/23/22 1242   Margins Defined edges 08/23/22 1242   Wound Thickness Description not for Pressure Injury Full thickness 08/23/22 1242   Number of days: 34       Wound 07/19/22 Toe (Comment  which one) Anterior; Left #2 GR (Active)   Wound Image   08/23/22 1242   Dressing Status New dressing applied 08/23/22 1302   Wound Cleansed Wound cleanser 08/23/22 1242   Offloading for Diabetic Foot Ulcers Post op shoe 07/19/22 1423   Wound Length (cm) 0.3 cm 08/23/22 1242   Wound Width (cm) 1 cm 08/23/22 1242   Wound Depth (cm) 0.1 cm 08/23/22 1242   Wound Surface Area (cm^2) 0.3 cm^2 08/23/22 1242   Change in Wound Size % (l*w) 92.59 08/23/22 1242   Wound Volume (cm^3) 0.03 cm^3 08/23/22 1242   Wound Healing % 93 08/23/22 1242   Post-Procedure Length (cm) 0.3 cm 08/23/22 1254   Post-Procedure Width (cm) 1 cm 08/23/22 1254   Post-Procedure Depth (cm) 0.1 cm 08/23/22 1254   Post-Procedure Surface Area (cm^2) 0.3 cm^2 08/23/22 1254   Post-Procedure Volume (cm^3) 0.03 cm^3 08/23/22 1254   Distance Tunneling (cm) 0 cm 08/23/22 1242   Tunneling Position ___ O'Clock 0 08/23/22 1242   Undermining Starts ___ O'Clock 0 08/23/22 1242   Undermining Ends___ O'Clock 0 08/23/22 1242   Undermining Maxium Distance (cm) 0 08/23/22 1242   Wound Assessment Pink/red 08/23/22 1242   Drainage Amount Moderate 08/23/22 1242   Drainage Description Yellow 08/23/22 1242   Odor None 08/23/22 1242   Dina-wound Assessment Warm 08/23/22 1242   Margins Defined edges 08/23/22 1242   Wound Thickness Description not for Pressure Injury Full thickness 08/23/22 1242   Number of days: 34       Wound 07/19/22 Toe (Comment  which one) Anterior; Left #3 2ND (Active)   Wound Image   08/23/22 1242   Dressing Status New dressing applied 08/23/22 1302   Wound Cleansed Wound cleanser 08/23/22 1242   Offloading for Diabetic Foot Ulcers Post op shoe 07/19/22 1423   Wound Length (cm) 3 cm 08/23/22 1242   Wound Width (cm) 2.5 cm 08/23/22 1242   Wound Depth (cm) 0.1 cm 08/23/22 1242   Wound Surface Area (cm^2) 7.5 cm^2 08/23/22 1242   Change in Wound Size % (l*w) -0.27 08/23/22 1242   Wound Volume (cm^3) 0.75 cm^3 08/23/22 1242   Wound Healing % 67 08/23/22 1242   Post-Procedure Length (cm) 3 cm 08/23/22 1254   Post-Procedure Width (cm) 2.5 cm 08/23/22 1254   Post-Procedure Depth (cm) 0.1 cm 08/23/22 1254   Post-Procedure Surface Area (cm^2) 7.5 cm^2 08/23/22 1254   Post-Procedure Volume (cm^3) 0.75 cm^3 08/23/22 1254   Distance Tunneling (cm) 0 cm 08/23/22 1242   Tunneling Position ___ O'Clock 0 08/23/22 1242   Undermining Starts ___ O'Clock 0 08/23/22 1242   Undermining Ends___ O'Clock 0 08/23/22 1242   Undermining Maxium Distance (cm) 0 08/23/22 1242   Wound Assessment Eschar dry;Eschar moist 08/23/22 1242   Drainage Amount Moderate 08/23/22 1242   Drainage Description Serosanguinous 08/23/22 1242   Odor None 08/23/22 1242   Dina-wound Assessment Dry/flaky 08/23/22 1242   Margins Defined edges 08/23/22 1242   Wound Thickness Description not for Pressure Injury Full thickness 08/23/22 1242   Number of days: 34                  Percent of Wound(s) Debrided: approximately 100%     Total  Area  Debrided:  7.86 sq cm     Bleeding:  Minimal     Hemostasis Achieved:  by pressure     Procedural Pain:  2  / 10     Post Procedural Pain:  2 / 10     Response to treatment:  Well tolerated by patient. Status of wound progress and description from last visit:   stable. Plan:         Discharge Instructions        PHYSICIAN ORDERS AND DISCHARGE INSTRUCTIONS     NOTE: Upon discharge from the [address], you will receive a patient experience survey. We would be grateful if you would take the time to fill this survey out. Wound care order history:                    Vascular studies: 6/8/2021 Date 11/12/2021              Imaging:  XRAY ordered for left foot 4/19/2022              Cultures: Left 2nd toe bone obtained on 11/12/2021              Grafts:  Date              Antibiotics:              Earlier Wound care treatments:              Primary care physician:     Continuing wound care orders and information:              Residence:                Continue home health care with:  Does not remember           Wound Medications:              Wound cleansing:                          Do not scrub or use excessive force.                           Wash hands with soap and water before and after dressing changes. Prior to applying a clean dressing, cleanse wound with normal saline,                                wound cleanser, or mild soap and water. Ask the physician or nurse before getting the wound(s) wet in a shower              Daily Wound management:                          Keep weight off wounds and reposition every 2 hours. Avoid standing for long periods of time. Apply wraps/stockings in AM and remove at bedtime. If swelling is present, elevate legs to the level of the heart or above for 30                                                         minutes 4-5 times a day and/or when sitting. When taking antibiotics take entire prescription as ordered by physician                                                         do not stop taking until medicine is all gone. Wound Care Notes:            Given surgical shoes 21      Orders for this week: 2022      Right 2nd toe-- keep clean and dry. Monitor for drainage      Right great toe, Left great toe, Left 2nd toe -- wash with soap,and water, pat dry. Moisturize both legs and feet with A+D. Apply Gentamicin and stimulin powder to wound bed and cover with silver alginate and qwick. Wrap with conform and tape. Change daily        Gave surgical shoes 21   Arterial referral on  2021 to [facility]        Follow up with Dr Errol Beach in 1 week in the wound care center. Call [phone] for any questions or concerns.   Date__________   Time____________  [place] [phone]        -Patient was seen, evaluated, and treated today  -Conservative and surgical treatment options discussed in detail with the patient today  -Left foot second toe partial amputation by general surgery about 3 weeks ago  -Surgery yesterday and the sutures were removed  -On examination today no signs of infection. Monitor for antibiotics. -Amputation site does appear to have some dehiscence to its.  -Patient does not have any recent arterial studies done. He did have an order placed by another physician at the end of July and patient is still not gotten these. Stressed importance of getting these at this time to assess healing to the lower extremities.   -We will consider MRI in the next visit specifically for the great toe wound if no better signs are noted to the site at that time given the chronicity of the wound and the nonhealing nature  -Continue with local wound care as directed  -Any concern for infection before the next visit to go to the emergency room  -Follow-up 1 week for recheck

## 2022-08-23 NOTE — DISCHARGE INSTRUCTIONS
PHYSICIAN ORDERS AND DISCHARGE INSTRUCTIONS     NOTE: Upon discharge from the [address], you will receive a patient experience survey. We would be grateful if you would take the time to fill this survey out. Wound care order history:                    Vascular studies: 6/8/2021 Date 11/12/2021              Imaging:  XRAY ordered for left foot 4/19/2022              Cultures: Left 2nd toe bone obtained on 11/12/2021              Grafts:  Date              Antibiotics:              Earlier Wound care treatments:              Primary care physician:     Continuing wound care orders and information:              Residence:                Continue home health care with:  Does not remember           Wound Medications:              Wound cleansing:                          Do not scrub or use excessive force. Wash hands with soap and water before and after dressing changes. Prior to applying a clean dressing, cleanse wound with normal saline,                                wound cleanser, or mild soap and water. Ask the physician or nurse before getting the wound(s) wet in a shower              Daily Wound management:                          Keep weight off wounds and reposition every 2 hours. Avoid standing for long periods of time. Apply wraps/stockings in AM and remove at bedtime. If swelling is present, elevate legs to the level of the heart or above for 30                                                         minutes 4-5 times a day and/or when sitting. When taking antibiotics take entire prescription as ordered by physician                                                         do not stop taking until medicine is all gone.           Wound Care Notes:            Given surgical shoes 11/22/21         Orders for this week: 2022     FAX ORDERS TO [facility]  -for skilled nursing wound care. Need to get Arterial Studies done (reminder on 22)  -Dr Nayana Raines ordered in 2022       Right great toe, Left great toe, Left 2nd toe -- wash with soap and water, pat dry. Moisturize both legs and feet with A+D. Apply Gentamicin and stimulin powder to wound bed and cover with silver alginate and qwick. Wrap with conform and tape. Change daily. Gave surgical shoes 21        Follow up with Dr Marcial Livingston in 1 week in the wound care center. Call [phone] for any questions or concerns.   Date__________   Time____________  [place] [phone]

## 2022-09-04 NOTE — PROGRESS NOTES
Chief Complaint   Patient presents with    Post-Op Check     2nd PO Amp. Of L Second toe @ [address] 7/26/22         SUBJECTIVE:  Patient here for post op visit  Pain is minimal.  Wounds: minbruising and no discharge.     Past Surgical History:   Procedure Laterality Date    CARDIAC CATHETERIZATION      per old chart done 10/2014    CARDIAC CATHETERIZATION  07/14/2017    with angiography of leg    CARDIAC CATHETERIZATION  11/20/2018    PATENT STENTS OF ALL THREE MAJOR VESSELS    CARDIAC DEFIBRILLATOR PLACEMENT  06/04/2010    Medtronic Secura DR Defibrillator Implanted    COLECTOMY Right 08/26/2016    laparascopic; robotic assisted    COLONOSCOPY  08/04/2016    CORONARY ANGIOPLASTY      \"15 Heart Stents\"    CORONARY ANGIOPLASTY WITH STENT PLACEMENT      per old chart had angio with stent to circumflex and obtuse marginal artery at [facility] 5/2010( old chart also gives hx of stent placement done 2000,2004 and 2005)    DENTAL SURGERY      Teeth Extracted In Past    IR TUNNELED CATHETER PLACEMENT GREATER THAN 5 YEARS  6/14/2021    IR TUNNELED CATHETER PLACEMENT GREATER THAN 5 YEARS 6/14/2021 [facility] SPECIAL PROCEDURES    PACEMAKER PLACEMENT  06/04/2010    Medtronic Secura DR Defibrillator Implanted    TOE AMPUTATION Right 09/12/2017    Rt 3rd toe    TOE AMPUTATION Right 01/09/2018     Right 5th toe amputation and Toenails trimmed left 2,3,4 and 5th toes    TOE AMPUTATION Left 12/26/2020    LEFT GREAT TOE AMPUTATION performed by Kris Bach MD at [place] [id number] Left 7/26/2022    LEFT SECOND TOE AMPUTATION performed by Bharath Weber MD at [address] [place] [address]      per old chart had balloon angioplasty right superfical femoral artery,right popliteal artery,,right ant.tibial artery, right tibioperoneal trunk, and right post.tibial artery wna stent placement right popliteal artery and superfical femoral artery 7/2012     Past Medical History:   Diagnosis Date    Acid reflux     Acute MI ([facility] [address] 2004, 2008 Nurse's intake history reviewed as noted.  Patient presents unaccompanied, due to 2 d hx of sharp epigastric pains, chills & sweating thru her shirt; 2 episodes of vomiting last night.  Pain came in waves.  Most recent menses started 2 d ago; takes Aleve for dysmenorrhea; they are usually back pains.  Ate roast, macaroni, candied yams & cabbage yesterday.  Eating no solids since then; drinking only water.  Stools yest x 2, very loose, but only during episodes of emesis; no stool since.  Usual stool pattern is for daily formed stool.  No fever documented.  Pain is still present today, to tears this AM, midregion below umbilicus, above pubic bone.  No dysuria. No new coital partners.  Used Ortho Evra, but stopped after 3 months because it didn't settle her cramps down. No male sex partners.  FMH+ gall stones, kidney stones & ovarian cysts.  Attends [employer].  Lives at home.  PHYSICAL EXAMINATION:  Visit Vitals  Temp 98.2 °F (36.8 °C) (Oral)   Wt 91.3 kg   LMP 09/22/2018 (Exact Date)      General:  Alert, in no acute distress.  Skin:  Warm with normal turgor.  Head:  Atraumatic and normocephalic.  Eyes: Eyelids and conjunctivae appear normal, no excessive tearing or discharge.  Ears:  Right - TM (tympanic membrane) is clear and normal appearing.  Left  - TM is clear and normal appearing.  Nose:  No flaring or discharge present.  Throat:  Oropharynx is clear, no redness or exudate present.  Neck:  Supple with no significant adenopathy, no thyromegaly.  Lungs:  Clear to auscultation, no wheezing or rhonchi noted.  Heart:  Regular rhythm, no murmur present.  Abdomen:  Normoactive bowel sounds, no distention; no palpable H/S megaly; generalized pain to palpation, w/o guarding or rebound; no CVA tenderness to percussion.  Extremities:  Full ROM (range of motion), with normal appearing joints.    Impression: abd pain  Plan:  As ordered.   Prescription as ordered.  Push fluids, geno cran juice blends.  Recheck prn if not clearly  Arthritis     Back    Broken teeth     Upper Front    CAD (coronary artery disease)     Sees Dr. Ananth Vidal [facility])     per old chart    CHF (congestive heart failure) ([facility])     Chronic back pain     Chronic kidney disease     STAGE 3 KIDNEY FAILURE- \"from my diabetes- do not follow with any one- have seen Dr Zina Hollis in the past\"    Diabetes mellitus [facility]) Dx 1965    per old chart pt has been diabetic since age 13    Diabetic neuropathy ([facility] [address]     \"in my feet\"    H/O cardiovascular stress test 08/25/2016    H/O Doppler ultrasound 09/27/2018    Moderate disease of the right lower extremity with an JALEN of 0.72. Moderate to severe disease of the left lower extremity with an JALEN of 0.55.     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    Neuropathy     both feet    neuropathy     PAD (peripheral artery disease) ([address] 09/27/2018    PVD (peripheral vascular disease) ([facility])     Sick sinus syndrome ([facility])     Sleep apnea     \"sleep study 3 yrs ago- could not tolerate the cpap made me too dry\"    Spinal stenosis     Teeth missing     Upper And Lower    Type 2 diabetes mellitus without complication ([address]     WD-Chronic foot ulcer, left, with necrosis of bone ([address] 11/12/2021     Family History   Problem Relation Age of Onset    Diabetes Mother     Stroke Mother     High Blood Pressure Mother     Vision Loss Mother     Cancer Father         Prostate Cancer    Diabetes Sister     Neuropathy Sister     Other Sister         \"Breathing Problems\"    Heart Disease Sister     Early Death Sister 62        Heart Complications    Cancer Brother improving.             Flu vaccine recommended.  Discussed risks, possible side effects, benefits of vaccines.  Declined.   \"Stomach Cancer\"    High Blood Pressure Brother     Diabetes Brother     Heart Disease Brother     High Blood Pressure Brother     Cancer Son         \"Testicle Cancer\"     Social History     Socioeconomic History    Marital status:      Spouse name: Not on file    Number of children: Not on file    Years of education: Not on file    Highest education level: Not on file   Occupational History    Not on file   Tobacco Use    Smoking status: Former     Packs/day: 0.25     Years: 36.00     Pack years: 9.00     Types: Cigars, Cigarettes     Start date: 1980     Quit date: 10/22/2021     Years since quittin.8    Smokeless tobacco: Never    Tobacco comments:     Client states he has stopped smoking   Vaping Use    Vaping Use: Never used   Substance and Sexual Activity    Alcohol use: No    Drug use: Yes     Types: Marijuana Shereen Lux)    Sexual activity: Never   Other Topics Concern    Not on file   Social History Narrative    Not on file     Social Determinants of Health     Financial Resource Strain: Not on file   Food Insecurity: Not on file   Transportation Needs: Not on file   Physical Activity: Not on file   Stress: Not on file   Social Connections: Not on file   Intimate Partner Violence: Not on file   Housing Stability: Not on file       OBJECTIVE:   Physical Exam    Wound well healed without signs of active infection. Suture line intact. Abdomen soft, nontender, nondistended. ASSESSMENT:  Patient doing well on this post operative check. Wounds well healed. 1. Postoperative examination        PLAN:  We will remove sutures today  Continue same  Increase activity as tolerated        No orders of the defined types were placed in this encounter. No orders of the defined types were placed in this encounter. Follow Up: No follow-ups on file.     Matilda Bernabe MD

## 2022-09-05 NOTE — PROGRESS NOTES
Chief Complaint   Patient presents with    Post-Op Check     1st p/o- amp of left second toe [facility] 07/27/22         SUBJECTIVE:  Patient here for post op visit. Pain is minimal.  Wounds: minbruising and no discharge.     Past Surgical History:   Procedure Laterality Date    CARDIAC CATHETERIZATION      per old chart done 10/2014    CARDIAC CATHETERIZATION  07/14/2017    with angiography of leg    CARDIAC CATHETERIZATION  11/20/2018    PATENT STENTS OF ALL THREE MAJOR VESSELS    CARDIAC DEFIBRILLATOR PLACEMENT  06/04/2010    Medtronic Secura DR Defibrillator Implanted    COLECTOMY Right 08/26/2016    laparascopic; robotic assisted    COLONOSCOPY  08/04/2016    CORONARY ANGIOPLASTY      \"15 Heart Stents\"    CORONARY ANGIOPLASTY WITH STENT PLACEMENT      per old chart had angio with stent to circumflex and obtuse marginal artery at [facility] 5/2010( old chart also gives hx of stent placement done 2000,2004 and 2005)    DENTAL SURGERY      Teeth Extracted In Past    IR TUNNELED CATHETER PLACEMENT GREATER THAN 5 YEARS  6/14/2021    IR TUNNELED CATHETER PLACEMENT GREATER THAN 5 YEARS 6/14/2021 [facility] SPECIAL PROCEDURES    PACEMAKER PLACEMENT  06/04/2010    Medtronic Secura DR Defibrillator Implanted    TOE AMPUTATION Right 09/12/2017    Rt 3rd toe    TOE AMPUTATION Right 01/09/2018     Right 5th toe amputation and Toenails trimmed left 2,3,4 and 5th toes    TOE AMPUTATION Left 12/26/2020    LEFT GREAT TOE AMPUTATION performed by Ferdinand Guzman MD at [address] Left 7/26/2022    LEFT SECOND TOE AMPUTATION performed by Ramesh Barreto MD at [address]      per old chart had balloon angioplasty right superfical femoral artery,right popliteal artery,,right ant.tibial artery, right tibioperoneal trunk, and right post.tibial artery wna stent placement right popliteal artery and superfical femoral artery 7/2012     Past Medical History:   Diagnosis Date    Acid reflux     Acute MI ([address] 2004, 2008 Arthritis     Back    Broken teeth     Upper Front    CAD (coronary artery disease)     Sees Dr. Marquez Roles [facility])     per old chart    CHF (congestive heart failure) ([facility])     Chronic back pain     Chronic kidney disease     STAGE 3 KIDNEY FAILURE- \"from my diabetes- do not follow with any one- have seen Dr Elizabeth Coats in the past\"    Diabetes mellitus [facility]) Dx 1965    per old chart pt has been diabetic since age 13    Diabetic neuropathy ([facility] [address]     \"in my feet\"    H/O cardiovascular stress test 08/25/2016    H/O Doppler ultrasound 09/27/2018    Moderate disease of the right lower extremity with an JALEN of 0.72. Moderate to severe disease of the left lower extremity with an JALEN of 0.55.     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    Neuropathy     both feet    neuropathy     PAD (peripheral artery disease) ([address] 09/27/2018    PVD (peripheral vascular disease) ([facility])     Sick sinus syndrome ([facility])     Sleep apnea     \"sleep study 3 yrs ago- could not tolerate the cpap made me too dry\"    Spinal stenosis     Teeth missing     Upper And Lower    Type 2 diabetes mellitus without complication ([address]     WD-Chronic foot ulcer, left, with necrosis of bone ([address] 11/12/2021     Family History   Problem Relation Age of Onset    Diabetes Mother     Stroke Mother     High Blood Pressure Mother     Vision Loss Mother     Cancer Father         Prostate Cancer    Diabetes Sister     Neuropathy Sister     Other Sister         \"Breathing Problems\"    Heart Disease Sister     Early Death Sister 62        Heart Complications    Cancer Brother \"Stomach Cancer\"    High Blood Pressure Brother     Diabetes Brother     Heart Disease Brother     High Blood Pressure Brother     Cancer Son         \"Testicle Cancer\"     Social History     Socioeconomic History    Marital status:      Spouse name: Not on file    Number of children: Not on file    Years of education: Not on file    Highest education level: Not on file   Occupational History    Not on file   Tobacco Use    Smoking status: Former     Packs/day: 0.25     Years: 36.00     Pack years: 9.00     Types: Cigars, Cigarettes     Start date: 1980     Quit date: 10/22/2021     Years since quittin.8    Smokeless tobacco: Never    Tobacco comments:     Client states he has stopped smoking   Vaping Use    Vaping Use: Never used   Substance and Sexual Activity    Alcohol use: No    Drug use: Yes     Types: Marijuana Charmayne Stai)    Sexual activity: Never   Other Topics Concern    Not on file   Social History Narrative    Not on file     Social Determinants of Health     Financial Resource Strain: Not on file   Food Insecurity: Not on file   Transportation Needs: Not on file   Physical Activity: Not on file   Stress: Not on file   Social Connections: Not on file   Intimate Partner Violence: Not on file   Housing Stability: Not on file       OBJECTIVE:   Physical Exam    Wound well healed without signs of active infection. Suture line intact. Abdomen soft, nontender, nondistended. ASSESSMENT:  Patient doing well on this post operative check. Wounds well healed. 1. Postoperative examination        PLAN:  Follow-up in 1 week to have sutures removed. Continue same  Increase activity as tolerated        No orders of the defined types were placed in this encounter. No orders of the defined types were placed in this encounter. Follow Up: No follow-ups on file.     Evette Aj MD

## 2022-09-12 ENCOUNTER — TELEPHONE (OUTPATIENT)
Dept: WOUND CARE | Age: 72
End: 2022-09-12

## 2022-09-12 NOTE — TELEPHONE ENCOUNTER
Adding wound etiologies.     Electronically signed by Luis Enrique Lopez RN on 9/12/2022 at 10:40 AM

## 2022-10-09 PROCEDURE — 93296 REM INTERROG EVL PM/IDS: CPT | Performed by: INTERNAL MEDICINE

## 2022-10-09 PROCEDURE — 93295 DEV INTERROG REMOTE 1/2/MLT: CPT | Performed by: INTERNAL MEDICINE

## 2022-10-09 PROCEDURE — 93297 REM INTERROG DEV EVAL ICPMS: CPT | Performed by: INTERNAL MEDICINE

## 2022-10-11 ENCOUNTER — TELEPHONE (OUTPATIENT)
Dept: CARDIOLOGY CLINIC | Age: 72
End: 2022-10-11

## 2022-10-11 ENCOUNTER — APPOINTMENT (OUTPATIENT)
Dept: GENERAL RADIOLOGY | Age: 72
DRG: 252 | End: 2022-10-11
Payer: MEDICARE

## 2022-10-11 ENCOUNTER — HOSPITAL ENCOUNTER (INPATIENT)
Age: 72
LOS: 12 days | Discharge: HOME HEALTH | DRG: 252 | End: 2022-10-24
Attending: EMERGENCY MEDICINE | Admitting: INTERNAL MEDICINE
Payer: MEDICARE

## 2022-10-11 ENCOUNTER — HOSPITAL ENCOUNTER (OUTPATIENT)
Dept: WOUND CARE | Age: 72
Discharge: HOME | End: 2022-10-11

## 2022-10-11 ENCOUNTER — PROCEDURE VISIT (OUTPATIENT)
Dept: CARDIOLOGY CLINIC | Age: 72
End: 2022-10-11
Payer: MEDICARE

## 2022-10-11 DIAGNOSIS — L97.509 DIABETIC FOOT ULCER WITH OSTEOMYELITIS (HCC): ICD-10-CM

## 2022-10-11 DIAGNOSIS — E11.621 DIABETIC FOOT ULCER WITH OSTEOMYELITIS (HCC): ICD-10-CM

## 2022-10-11 DIAGNOSIS — M86.9 DIABETIC FOOT ULCER WITH OSTEOMYELITIS (HCC): ICD-10-CM

## 2022-10-11 DIAGNOSIS — I50.9 ACUTE ON CHRONIC HEART FAILURE, UNSPECIFIED HEART FAILURE TYPE (HCC): ICD-10-CM

## 2022-10-11 DIAGNOSIS — Z95.810 ICD (IMPLANTABLE CARDIOVERTER-DEFIBRILLATOR), DUAL, IN SITU: Primary | ICD-10-CM

## 2022-10-11 DIAGNOSIS — E87.5 HYPERKALEMIA: ICD-10-CM

## 2022-10-11 DIAGNOSIS — E11.69 DIABETIC FOOT ULCER WITH OSTEOMYELITIS (HCC): ICD-10-CM

## 2022-10-11 DIAGNOSIS — I49.5 SINUS NODE DYSFUNCTION (HCC): ICD-10-CM

## 2022-10-11 DIAGNOSIS — M79.89 LEG SWELLING: Primary | ICD-10-CM

## 2022-10-11 PROCEDURE — 83880 ASSAY OF NATRIURETIC PEPTIDE: CPT

## 2022-10-11 PROCEDURE — 96374 THER/PROPH/DIAG INJ IV PUSH: CPT

## 2022-10-11 PROCEDURE — 6360000002 HC RX W HCPCS: Performed by: EMERGENCY MEDICINE

## 2022-10-11 PROCEDURE — 85025 COMPLETE CBC W/AUTO DIFF WBC: CPT

## 2022-10-11 PROCEDURE — 99285 EMERGENCY DEPT VISIT HI MDM: CPT

## 2022-10-11 PROCEDURE — 84484 ASSAY OF TROPONIN QUANT: CPT

## 2022-10-11 PROCEDURE — 80053 COMPREHEN METABOLIC PANEL: CPT

## 2022-10-11 PROCEDURE — 96375 TX/PRO/DX INJ NEW DRUG ADDON: CPT

## 2022-10-11 PROCEDURE — 71045 X-RAY EXAM CHEST 1 VIEW: CPT

## 2022-10-11 PROCEDURE — 93005 ELECTROCARDIOGRAM TRACING: CPT | Performed by: EMERGENCY MEDICINE

## 2022-10-11 RX ORDER — FUROSEMIDE 10 MG/ML
40 INJECTION INTRAMUSCULAR; INTRAVENOUS ONCE
Status: COMPLETED | OUTPATIENT
Start: 2022-10-11 | End: 2022-10-11

## 2022-10-11 RX ADMIN — FUROSEMIDE 40 MG: 10 INJECTION, SOLUTION INTRAVENOUS at 23:37

## 2022-10-11 ASSESSMENT — PAIN DESCRIPTION - ORIENTATION: ORIENTATION: RIGHT;LEFT

## 2022-10-11 ASSESSMENT — PAIN - FUNCTIONAL ASSESSMENT: PAIN_FUNCTIONAL_ASSESSMENT: 0-10

## 2022-10-11 ASSESSMENT — PAIN SCALES - GENERAL: PAINLEVEL_OUTOF10: 7

## 2022-10-11 ASSESSMENT — PAIN DESCRIPTION - LOCATION: LOCATION: FOOT

## 2022-10-11 NOTE — LETTER
Cardiology [address]  Phone: [phone]  Fax: [phone]    10/11/2022        [phone] [id number]            Dear Marlen Peters:    This is your [facility] schedule. You can kris your calendar with these dates. Remember that your device is wireless and should automatically do these checks while you are sleeping. If for any reason I do not get your transmission then I will call you and ask that you send a manual transmission. If you have any questions or concerns, please call and ask for Deysi See at [phone]. Thank you.

## 2022-10-12 PROBLEM — I50.9 HEART FAILURE EXACERBATED BY SOTALOL (HCC): Status: ACTIVE | Noted: 2022-10-12

## 2022-10-12 PROBLEM — I50.43 CHF (CONGESTIVE HEART FAILURE), NYHA CLASS I, ACUTE ON CHRONIC, COMBINED (HCC): Status: ACTIVE | Noted: 2022-10-12

## 2022-10-12 LAB
ALBUMIN SERPL-MCNC: 2.6 GM/DL (ref 3.4–5)
ALP BLD-CCNC: 70 IU/L (ref 40–129)
ALT SERPL-CCNC: 11 U/L (ref 10–40)
ANION GAP SERPL CALCULATED.3IONS-SCNC: 9 MMOL/L (ref 4–16)
ANION GAP SERPL CALCULATED.3IONS-SCNC: 9 MMOL/L (ref 4–16)
AST SERPL-CCNC: 35 IU/L (ref 15–37)
BASOPHILS ABSOLUTE: 0 K/CU MM
BASOPHILS RELATIVE PERCENT: 0.5 % (ref 0–1)
BILIRUB SERPL-MCNC: 0.9 MG/DL (ref 0–1)
BUN BLDV-MCNC: 33 MG/DL (ref 6–23)
BUN BLDV-MCNC: 33 MG/DL (ref 6–23)
CALCIUM SERPL-MCNC: 8.2 MG/DL (ref 8.3–10.6)
CALCIUM SERPL-MCNC: 8.2 MG/DL (ref 8.3–10.6)
CHLORIDE BLD-SCNC: 106 MMOL/L (ref 99–110)
CHLORIDE BLD-SCNC: 108 MMOL/L (ref 99–110)
CO2: 20 MMOL/L (ref 21–32)
CO2: 23 MMOL/L (ref 21–32)
CREAT SERPL-MCNC: 2.2 MG/DL (ref 0.9–1.3)
CREAT SERPL-MCNC: 2.2 MG/DL (ref 0.9–1.3)
DIFFERENTIAL TYPE: ABNORMAL
EKG ATRIAL RATE: 69 BPM
EKG DIAGNOSIS: NORMAL
EKG Q-T INTERVAL: 406 MS
EKG QRS DURATION: 108 MS
EKG QTC CALCULATION (BAZETT): 435 MS
EKG R AXIS: 78 DEGREES
EKG T AXIS: -41 DEGREES
EKG VENTRICULAR RATE: 69 BPM
EOSINOPHILS ABSOLUTE: 0.1 K/CU MM
EOSINOPHILS RELATIVE PERCENT: 1.3 % (ref 0–3)
ESTIMATED AVERAGE GLUCOSE: 157 MG/DL
GFR AFRICAN AMERICAN: 36 ML/MIN/1.73M2
GFR AFRICAN AMERICAN: 36 ML/MIN/1.73M2
GFR NON-AFRICAN AMERICAN: 30 ML/MIN/1.73M2
GFR NON-AFRICAN AMERICAN: 30 ML/MIN/1.73M2
GLUCOSE BLD-MCNC: 100 MG/DL (ref 70–99)
GLUCOSE BLD-MCNC: 110 MG/DL (ref 70–99)
GLUCOSE BLD-MCNC: 110 MG/DL (ref 70–99)
GLUCOSE BLD-MCNC: 134 MG/DL (ref 70–99)
GLUCOSE BLD-MCNC: 143 MG/DL (ref 70–99)
GLUCOSE BLD-MCNC: 86 MG/DL (ref 70–99)
HBA1C MFR BLD: 7.1 % (ref 4.2–6.3)
HCT VFR BLD CALC: 30.4 % (ref 42–52)
HEMOGLOBIN: 9.3 GM/DL (ref 13.5–18)
IMMATURE NEUTROPHIL %: 0.2 % (ref 0–0.43)
LV EF: 53 %
LVEF MODALITY: NORMAL
LYMPHOCYTES ABSOLUTE: 1.2 K/CU MM
LYMPHOCYTES RELATIVE PERCENT: 21.1 % (ref 24–44)
MCH RBC QN AUTO: 27 PG (ref 27–31)
MCHC RBC AUTO-ENTMCNC: 30.6 % (ref 32–36)
MCV RBC AUTO: 88.4 FL (ref 78–100)
MONOCYTES ABSOLUTE: 0.3 K/CU MM
MONOCYTES RELATIVE PERCENT: 6.2 % (ref 0–4)
NUCLEATED RBC %: 0 %
PDW BLD-RTO: 16.3 % (ref 11.7–14.9)
PLATELET # BLD: 226 K/CU MM (ref 140–440)
PMV BLD AUTO: 10.1 FL (ref 7.5–11.1)
POTASSIUM SERPL-SCNC: 4.6 MMOL/L (ref 3.5–5.1)
POTASSIUM SERPL-SCNC: 5.3 MMOL/L (ref 3.5–5.1)
PRO-BNP: 8657 PG/ML
RBC # BLD: 3.44 M/CU MM (ref 4.6–6.2)
SEGMENTED NEUTROPHILS ABSOLUTE COUNT: 3.9 K/CU MM
SEGMENTED NEUTROPHILS RELATIVE PERCENT: 70.7 % (ref 36–66)
SODIUM BLD-SCNC: 135 MMOL/L (ref 135–145)
SODIUM BLD-SCNC: 140 MMOL/L (ref 135–145)
TOTAL IMMATURE NEUTOROPHIL: 0.01 K/CU MM
TOTAL NUCLEATED RBC: 0 K/CU MM
TOTAL PROTEIN: 6.4 GM/DL (ref 6.4–8.2)
TROPONIN T: 0.06 NG/ML
TROPONIN T: 0.07 NG/ML
WBC # BLD: 5.5 K/CU MM (ref 4–10.5)

## 2022-10-12 PROCEDURE — 6370000000 HC RX 637 (ALT 250 FOR IP): Performed by: STUDENT IN AN ORGANIZED HEALTH CARE EDUCATION/TRAINING PROGRAM

## 2022-10-12 PROCEDURE — G0378 HOSPITAL OBSERVATION PER HR: HCPCS

## 2022-10-12 PROCEDURE — 87181 SC STD AGAR DILUTION PER AGT: CPT

## 2022-10-12 PROCEDURE — 96372 THER/PROPH/DIAG INJ SC/IM: CPT

## 2022-10-12 PROCEDURE — 96376 TX/PRO/DX INJ SAME DRUG ADON: CPT

## 2022-10-12 PROCEDURE — 2700000000 HC OXYGEN THERAPY PER DAY

## 2022-10-12 PROCEDURE — 99213 OFFICE O/P EST LOW 20 MIN: CPT

## 2022-10-12 PROCEDURE — 2580000003 HC RX 258: Performed by: STUDENT IN AN ORGANIZED HEALTH CARE EDUCATION/TRAINING PROGRAM

## 2022-10-12 PROCEDURE — 93306 TTE W/DOPPLER COMPLETE: CPT

## 2022-10-12 PROCEDURE — 97166 OT EVAL MOD COMPLEX 45 MIN: CPT

## 2022-10-12 PROCEDURE — G0378 HOSPITAL OBSERVATION PER HR: HCPCS | Performed by: STUDENT IN AN ORGANIZED HEALTH CARE EDUCATION/TRAINING PROGRAM

## 2022-10-12 PROCEDURE — 36415 COLL VENOUS BLD VENIPUNCTURE: CPT

## 2022-10-12 PROCEDURE — 97161 PT EVAL LOW COMPLEX 20 MIN: CPT

## 2022-10-12 PROCEDURE — 1200000000 HC SEMI PRIVATE

## 2022-10-12 PROCEDURE — 6370000000 HC RX 637 (ALT 250 FOR IP)

## 2022-10-12 PROCEDURE — 87186 SC STD MICRODIL/AGAR DIL: CPT

## 2022-10-12 PROCEDURE — 94761 N-INVAS EAR/PLS OXIMETRY MLT: CPT

## 2022-10-12 PROCEDURE — 99223 1ST HOSP IP/OBS HIGH 75: CPT | Performed by: SURGERY

## 2022-10-12 PROCEDURE — 87075 CULTR BACTERIA EXCEPT BLOOD: CPT

## 2022-10-12 PROCEDURE — 93010 ELECTROCARDIOGRAM REPORT: CPT | Performed by: INTERNAL MEDICINE

## 2022-10-12 PROCEDURE — 94640 AIRWAY INHALATION TREATMENT: CPT

## 2022-10-12 PROCEDURE — 82962 GLUCOSE BLOOD TEST: CPT

## 2022-10-12 PROCEDURE — 87077 CULTURE AEROBIC IDENTIFY: CPT

## 2022-10-12 PROCEDURE — 84484 ASSAY OF TROPONIN QUANT: CPT

## 2022-10-12 PROCEDURE — 83036 HEMOGLOBIN GLYCOSYLATED A1C: CPT

## 2022-10-12 PROCEDURE — 80048 BASIC METABOLIC PNL TOTAL CA: CPT

## 2022-10-12 PROCEDURE — 6360000002 HC RX W HCPCS: Performed by: INTERNAL MEDICINE

## 2022-10-12 PROCEDURE — 6360000002 HC RX W HCPCS: Performed by: STUDENT IN AN ORGANIZED HEALTH CARE EDUCATION/TRAINING PROGRAM

## 2022-10-12 RX ORDER — ONDANSETRON 4 MG/1
4 TABLET, ORALLY DISINTEGRATING ORAL EVERY 8 HOURS PRN
Status: DISCONTINUED | OUTPATIENT
Start: 2022-10-12 | End: 2022-10-24 | Stop reason: HOSPADM

## 2022-10-12 RX ORDER — SODIUM CHLORIDE 9 MG/ML
INJECTION, SOLUTION INTRAVENOUS PRN
Status: DISCONTINUED | OUTPATIENT
Start: 2022-10-12 | End: 2022-10-24 | Stop reason: HOSPADM

## 2022-10-12 RX ORDER — INSULIN LISPRO 100 [IU]/ML
0-4 INJECTION, SOLUTION INTRAVENOUS; SUBCUTANEOUS NIGHTLY
Status: DISCONTINUED | OUTPATIENT
Start: 2022-10-12 | End: 2022-10-24 | Stop reason: HOSPADM

## 2022-10-12 RX ORDER — ONDANSETRON 2 MG/ML
4 INJECTION INTRAMUSCULAR; INTRAVENOUS EVERY 6 HOURS PRN
Status: DISCONTINUED | OUTPATIENT
Start: 2022-10-12 | End: 2022-10-24 | Stop reason: HOSPADM

## 2022-10-12 RX ORDER — ATORVASTATIN CALCIUM 40 MG/1
40 TABLET, FILM COATED ORAL NIGHTLY
Status: DISCONTINUED | OUTPATIENT
Start: 2022-10-12 | End: 2022-10-24 | Stop reason: HOSPADM

## 2022-10-12 RX ORDER — FUROSEMIDE 10 MG/ML
20 INJECTION INTRAMUSCULAR; INTRAVENOUS 2 TIMES DAILY
Status: DISCONTINUED | OUTPATIENT
Start: 2022-10-12 | End: 2022-10-13

## 2022-10-12 RX ORDER — ALGINATE DRESSING 2" X 2"
1 BANDAGE TOPICAL DAILY
Status: DISCONTINUED | OUTPATIENT
Start: 2022-10-12 | End: 2022-10-13

## 2022-10-12 RX ORDER — CARVEDILOL 6.25 MG/1
3.12 TABLET ORAL 2 TIMES DAILY
Status: DISCONTINUED | OUTPATIENT
Start: 2022-10-12 | End: 2022-10-14

## 2022-10-12 RX ORDER — DEXTROSE MONOHYDRATE 100 MG/ML
INJECTION, SOLUTION INTRAVENOUS CONTINUOUS PRN
Status: DISCONTINUED | OUTPATIENT
Start: 2022-10-12 | End: 2022-10-24 | Stop reason: HOSPADM

## 2022-10-12 RX ORDER — OXYCODONE HYDROCHLORIDE AND ACETAMINOPHEN 5; 325 MG/1; MG/1
1 TABLET ORAL 2 TIMES DAILY PRN
Status: DISCONTINUED | OUTPATIENT
Start: 2022-10-12 | End: 2022-10-24 | Stop reason: HOSPADM

## 2022-10-12 RX ORDER — ACETAMINOPHEN 325 MG/1
650 TABLET ORAL EVERY 4 HOURS PRN
Status: DISCONTINUED | OUTPATIENT
Start: 2022-10-12 | End: 2022-10-18 | Stop reason: SDUPTHER

## 2022-10-12 RX ORDER — AMLODIPINE BESYLATE 10 MG/1
10 TABLET ORAL DAILY
Status: DISCONTINUED | OUTPATIENT
Start: 2022-10-12 | End: 2022-10-14

## 2022-10-12 RX ORDER — SODIUM CHLORIDE 0.9 % (FLUSH) 0.9 %
5-40 SYRINGE (ML) INJECTION PRN
Status: DISCONTINUED | OUTPATIENT
Start: 2022-10-12 | End: 2022-10-24 | Stop reason: HOSPADM

## 2022-10-12 RX ORDER — ENOXAPARIN SODIUM 100 MG/ML
30 INJECTION SUBCUTANEOUS 2 TIMES DAILY
Status: DISCONTINUED | OUTPATIENT
Start: 2022-10-12 | End: 2022-10-23

## 2022-10-12 RX ORDER — GABAPENTIN 300 MG/1
300 CAPSULE ORAL 3 TIMES DAILY
Status: DISCONTINUED | OUTPATIENT
Start: 2022-10-12 | End: 2022-10-19

## 2022-10-12 RX ORDER — CLOPIDOGREL BISULFATE 75 MG/1
75 TABLET ORAL DAILY
Status: DISCONTINUED | OUTPATIENT
Start: 2022-10-12 | End: 2022-10-18 | Stop reason: SDUPTHER

## 2022-10-12 RX ORDER — INSULIN LISPRO 100 [IU]/ML
0-4 INJECTION, SOLUTION INTRAVENOUS; SUBCUTANEOUS
Status: DISCONTINUED | OUTPATIENT
Start: 2022-10-12 | End: 2022-10-24 | Stop reason: HOSPADM

## 2022-10-12 RX ORDER — METAXALONE 800 MG/1
800 TABLET ORAL 3 TIMES DAILY
Status: DISCONTINUED | OUTPATIENT
Start: 2022-10-12 | End: 2022-10-24

## 2022-10-12 RX ORDER — ALBUTEROL SULFATE 90 UG/1
2 AEROSOL, METERED RESPIRATORY (INHALATION) EVERY 4 HOURS PRN
Status: DISCONTINUED | OUTPATIENT
Start: 2022-10-12 | End: 2022-10-24 | Stop reason: HOSPADM

## 2022-10-12 RX ORDER — SODIUM CHLORIDE 0.9 % (FLUSH) 0.9 %
5-40 SYRINGE (ML) INJECTION EVERY 12 HOURS SCHEDULED
Status: DISCONTINUED | OUTPATIENT
Start: 2022-10-12 | End: 2022-10-24 | Stop reason: HOSPADM

## 2022-10-12 RX ADMIN — ENOXAPARIN SODIUM 30 MG: 100 INJECTION SUBCUTANEOUS at 08:46

## 2022-10-12 RX ADMIN — ATORVASTATIN CALCIUM 40 MG: 40 TABLET, FILM COATED ORAL at 20:52

## 2022-10-12 RX ADMIN — GABAPENTIN 300 MG: 300 CAPSULE ORAL at 20:52

## 2022-10-12 RX ADMIN — CLOPIDOGREL BISULFATE 75 MG: 75 TABLET ORAL at 08:46

## 2022-10-12 RX ADMIN — CARVEDILOL 3.12 MG: 6.25 TABLET, FILM COATED ORAL at 20:52

## 2022-10-12 RX ADMIN — GABAPENTIN 300 MG: 300 CAPSULE ORAL at 14:30

## 2022-10-12 RX ADMIN — TIOTROPIUM BROMIDE INHALATION SPRAY 2 PUFF: 3.12 SPRAY, METERED RESPIRATORY (INHALATION) at 08:01

## 2022-10-12 RX ADMIN — CARVEDILOL 3.12 MG: 6.25 TABLET, FILM COATED ORAL at 08:45

## 2022-10-12 RX ADMIN — OXYCODONE AND ACETAMINOPHEN 1 TABLET: 5; 325 TABLET ORAL at 18:53

## 2022-10-12 RX ADMIN — ENOXAPARIN SODIUM 30 MG: 100 INJECTION SUBCUTANEOUS at 20:55

## 2022-10-12 RX ADMIN — OXYCODONE AND ACETAMINOPHEN 1 TABLET: 5; 325 TABLET ORAL at 08:56

## 2022-10-12 RX ADMIN — AMLODIPINE BESYLATE 10 MG: 10 TABLET ORAL at 08:45

## 2022-10-12 RX ADMIN — SODIUM CHLORIDE, PRESERVATIVE FREE 10 ML: 5 INJECTION INTRAVENOUS at 20:52

## 2022-10-12 RX ADMIN — COLLAGENASE SANTYL: 250 OINTMENT TOPICAL at 14:12

## 2022-10-12 RX ADMIN — Medication 1 DEVICE: at 14:12

## 2022-10-12 RX ADMIN — GABAPENTIN 300 MG: 300 CAPSULE ORAL at 08:45

## 2022-10-12 RX ADMIN — FUROSEMIDE 20 MG: 10 INJECTION, SOLUTION INTRAVENOUS at 17:49

## 2022-10-12 RX ADMIN — SODIUM CHLORIDE, PRESERVATIVE FREE 10 ML: 5 INJECTION INTRAVENOUS at 10:20

## 2022-10-12 RX ADMIN — FUROSEMIDE 20 MG: 10 INJECTION, SOLUTION INTRAVENOUS at 10:46

## 2022-10-12 ASSESSMENT — PAIN DESCRIPTION - DESCRIPTORS
DESCRIPTORS: ACHING;STABBING
DESCRIPTORS: ACHING

## 2022-10-12 ASSESSMENT — PAIN SCALES - GENERAL
PAINLEVEL_OUTOF10: 7
PAINLEVEL_OUTOF10: 8
PAINLEVEL_OUTOF10: 8

## 2022-10-12 ASSESSMENT — PAIN DESCRIPTION - ORIENTATION
ORIENTATION: RIGHT;LEFT
ORIENTATION: RIGHT;LEFT

## 2022-10-12 ASSESSMENT — PAIN DESCRIPTION - LOCATION
LOCATION: FOOT
LOCATION: LEG

## 2022-10-12 NOTE — CARE COORDINATION
LSW and SW student saw pt. Pt stated he lives in a 2 story house with his daughter and grandchildren. Pt is independent of his ADLS. Pt has wheelchair, oxygen, a walker, and an electric bed. Pt has Jewels Valentine with [address]. Jewels Valentine visits home once a week for about 45 minutes to change the dressings on his feet. Pt has an aide and passport services through [facility]. Pt has PCP and insurance and can afford their medications. Upon discharge pt states his family can transport him home. Pt is refusing to work with PT/OT. His plans are to return home with [address].

## 2022-10-12 NOTE — CONSULTS
[address] [facility], 1950, [id number], 10/12/2022    History  [place]:  The primary encounter diagnosis was Leg swelling. A diagnosis of Acute on chronic heart failure, unspecified heart failure type [facility]) was also pertinent to this visit. Patient  has a past medical history of Acid reflux, Acute MI ([address] Arthritis, Broken teeth, CAD (coronary artery disease), Cardiomyopathy ([address] CHF (congestive heart failure) ([address] Chronic back pain, Chronic kidney disease, Diabetes mellitus ([address] Diabetic neuropathy ([address] H/O cardiovascular stress test, H/O Doppler ultrasound, H/O percutaneous left heart catheterization, History of irregular heartbeat, History of syncope, Hyperlipidemia, Hypertension, Leg swelling, Necrotic toes (HCC), Neuropathy, neuropathy, PAD (peripheral artery disease) ([address] PVD (peripheral vascular disease) ([address] Sick sinus syndrome ([address] Sleep apnea, Spinal stenosis, Teeth missing, Type 2 diabetes mellitus without complication ([address] and WD-Chronic foot ulcer, left, with necrosis of bone ([address] Patient  has a past surgical history that includes Coronary angioplasty with stent; Dental surgery; Colonoscopy (08/04/2016); pacemaker placement (06/04/2010); vascular surgery; colectomy (Right, 08/26/2016); Toe amputation (Right, 09/12/2017); Toe amputation (Right, 01/09/2018); Cardiac catheterization; Cardiac defibrillator placement (06/04/2010); Coronary angioplasty; Cardiac catheterization (07/14/2017); Cardiac catheterization (11/20/2018); Toe amputation (Left, 12/26/2020); IR TUNNELED CVC PLACE WO SQ PORT/PUMP > 5 YEARS (6/14/2021); and Toe amputation (Left, 7/26/2022). Subjective:  Patient states:  \"I'm not getting up until I'm less swollen! \"    Pain:  denies pain at rest.    Communication with other providers:  Handoff to RN, OT  Restrictions: general precautions, fall risk    Home Setup/Prior level of function   Lives With: Daughter (and grandchildren)  Type of Home: House  Home Layout: Two level (Uses one story for him)  Home Access: Stairs to enter with rails,Ramped entrance  Entrance Stairs - Number of Steps: 8 in front and ramp to back door  Entrance Stairs - Rails: Right  Bathroom Shower/Tub: Walk-in shower (Can drive hover round into shower)  Bathroom Toilet: Standard  Bathroom Equipment: Tub transfer bench  Bathroom Accessibility: Accessible  Home Equipment: Wheelchair and hoverround and FWW   Receives Help From: Home health,Family (states bathed and dressed self. Has HH to do housekeeping chores for 7 hours a day)  ADL Assistance: [address]: Needs assistance  Meal Prep: Moderate  Homemaking Responsibilities: No  Ambulation Assistance:  (Unable to stand on feet at this time. Gets around in w/c and hover round at home.)  Active : No  Occupation: Retired  Type of occupation: Trained truck drivers  Leisure & Hobbies: Likes to chart maps for fun  Comments: Currently unable to use hover round d/t flat tire    Examination of body systems (includes body structures/functions, activity/participation limitations):  Observation:  Pt supine in bed upon arrival and agreeable to therapy if does not have to sit up  Vision:  [facility]  Hearing:  [facility]  Cardiopulmonary:  upon entry pt without O2 in nose, SpO2 79%; O2 reapplied and SpO2 improved to 97%. Pt on 1.5L   Cognition: WFL, see OT/SLP note for further evaluation. Musculoskeletal  ROM R/L:  WFL. Strength R LE 3/5, L LE 3-/5; significant impairment in function and endurance.     Neuro:  pt reports diminished sensation in bilateral LEs secondary to swelling      Mobility:  Rolling L/R:  NT, pt refused  Supine to sit:  NT, pt refused  Transfers: NT  Sitting balance:  NT.    Standing balance:  NT.    Gait: NT    [facility] 6 Clicks Inpatient Mobility:  AM-PAC Inpatient Mobility Raw Score : 7    Safety: patient left supine in bed with alarm on, call light within reach, RN notified, gait belt used. Assessment:  Pt is a 67 y.o. male admitted to the hospital for heart failure exacerbated by sotalol. Pt is typically stand pivoting mod I at baseline to/from electric scooter. Pt is currently unable to roll or transfer due to increased pain with movement. Pt is presenting with increased pain, impaired bed mobility, impaired transfers, impaired gait, decreased strength, impaired balance, and impaired sensation. Pt would benefit from continued acute care PT as well as SNF placement upon discharge to continue to address impairments. Will continue to assess mobility as pain decreases. Complexity: Low  Prognosis: Good, no significant barriers to participation at this time.    General Plan: 2-3 times per week/week     Equipment: TBD     Goals:  Short Term Goals  Time Frame for Short Term Goals: 1 week  Short Term Goal 1: pt to complete all bed mobility max A  Short Term Goal 2: Pt to complete stand pivot transfer with LRAD mod A  Short Term Goal 3: Pt to propel manual ' with SBA       Treatment plan:  Bed mobility, transfers, balance, gait, TA, TX,    Recommendations for NURSING mobility: RAMYA    Time:   Time in: 1025  Time out: 1042  Timed treatment minutes: 0  Total time: 17    Electronically signed by:    Shanita Durbin, PT  10/12/2022, 12:12 PM

## 2022-10-12 NOTE — CONSULTS
[facility] THERAPY EVALUATION    Sriram Castillo, 1950, [id number]/[id number], 10/12/2022      Discharge Recommendation: SNF     History:  [place]:  The primary encounter diagnosis was Leg swelling. A diagnosis of Acute on chronic heart failure, unspecified heart failure type [facility]) was also pertinent to this visit. Pt  has a past medical history of Acid reflux, Acute MI ([address] Arthritis, Broken teeth, CAD (coronary artery disease), Cardiomyopathy ([address] CHF (congestive heart failure) ([address] Chronic back pain, Chronic kidney disease, Diabetes mellitus ([address] Diabetic neuropathy ([address] H/O cardiovascular stress test, H/O Doppler ultrasound, H/O percutaneous left heart catheterization, History of irregular heartbeat, History of syncope, Hyperlipidemia, Hypertension, Leg swelling, Necrotic toes (HCC), Neuropathy, neuropathy, PAD (peripheral artery disease) ([address] PVD (peripheral vascular disease) ([address] Sick sinus syndrome ([address] Sleep apnea, Spinal stenosis, Teeth missing, Type 2 diabetes mellitus without complication ([address] and WD-Chronic foot ulcer, left, with necrosis of bone ([address] Subjective:  Patient states: \"I cannot move with my genitals this swollen, they get smasheD!  And I guess there is a wound down there\"  Pain: declined at rest, but reported 10/10 with activity   Communication with other providers: Coeval with PT for pt safety and tolerance, RN, RN handoff  Restrictions: general precautions, fall risk, tele   No one at bedside    Home Setup/Prior level of function:  Social/Functional History  Lives With: Daughter (and grandchildren)  Type of Home: House  Home Layout: Two level (Uses one story for him)  Home Access: Stairs to enter with rails,Ramped entrance  Entrance Stairs - Number of Steps: 8 in front and ramp to back door  Entrance Stairs - Rails: Right  Bathroom Shower/Tub: Walk-in shower (Can drive hover round into shower)  Bathroom Toilet: Standard  Bathroom Equipment: Tub transfer bench  Bathroom Accessibility: Accessible  Home Equipment: Wheelchair and hoverround and FWW   Receives Help From: Home health,Family (states bathed and dressed self. Has HH to do housekeeping chores for 7 hours a day)  ADL Assistance: [address]: Needs assistance  Meal Prep: Moderate  Homemaking Responsibilities: No  Ambulation Assistance:  (Unable to stand on feet at this time. Gets around in w/c and hover round at home.)  Active : No  Occupation: Retired  Type of occupation: Trained truck drivers  Leisure & Hobbies: Likes to chart maps for fun  Comments: Currently unable to use hover round d/t flat tire    Examination:  Observation: Pt was semifowlers upon arrival, agreeable to session  Vision: WFL   Hearing: WFL  Objective Measures: Pt presented with no O2 supp in nose upon arrival. SpO2 measured 79-81%. Pt educated importance of O2 supp, upon re-application, SpO2 measured 91-97%. RN notified of O2 removal by pt. Pt on 1.5L O2. Body Systems and functions:  ROM: WFL in BUE  Strength: 4+/5 in BUE  Sensation: WFL  Tone: normotonic in BUE  Coordination: movements fluid and coordinated  Posture: normal posture  Activity Tolerance: Poor d/t reported pain     Activities of Daily Living (ADLs):  Feeding:   Grooming:    Toileting:   UB dressing:   LB dressing:   UB bathing:   LB bathing:      *pt ADL function inferred from gross functional assessment of mobility, balance, posture, safety awareness, activity tolerance (unless otherwise indicated)    Cognitive and Psychosocial Functioning:  Overall cognitive status: WFL  Affect: normal     Balance:   Sitting: NT d/t pt refusal  Standing: NT d/t pt refusal    Functional Mobility:  Rolling Laterally in Bed: NT d/t pt refusal   Supine to Sit: NT d/t pt refusal  Sit to Stand: NT d/t pt refusal    Ambulation: NT d/t pt refusal      AM-PAC 6 click short form for inpatient daily activity:   How much help from another person does the patient currently need. .. Unable  Dep A Lot  Max A A Lot   Mod A A Little  Min A A Little   CGA  SBA None   Mod I  Indep  Sup   1. Putting on and taking off regular lower body clothing? [x] 1    [] 2   [] 2   [] 3   [] 3   [] 4      2. Bathing (including washing, rinsing, drying)? [x] 1   [] 2   [] 2 [] 3 [] 3 [] 4   3. Toileting, which includes using toilet, bedpan, or urinal? [x] 1    [] 2   [] 2   [] 3   [] 3   [] 4     4. Putting on and taking off regular upper body clothing? [x] 1   [] 2   [] 2   [] 3   [] 3    [] 4      5. Taking care of personal grooming such as brushing teeth? [] 1   [] 2    [] 2 [] 3    [x] 3   [] 4      6. Eating meals? [] 1   [] 2   [] 2   [] 3   [x] 3   [] 4        Raw Score:  10      24/24 = unimpaired  23/24 = 1-20% impaired   20/24-22/24 = 21-40% impaired  15/24-19/24 = 41-59% impaired   10/24-14/24 = 60%-79% impaired  7/24-9/24 = 80%-99% impaired  6/24 = 100% impaired       Education: Role of OT, OT POC, discharge needs, safety, benefits of EOB/OOB activity, AE needs, importance of frequent mobility, potential for atrophy without OOB activity, skin integrity concern without frequent movement  Safety Measures: Gait belt used for safety of pt and therapist, Left in bed per request, Alarm in place, call light and phone within reach, lines managed    Assessment:  Pt is a 67 yr old male from home who presents with heart failure exacerbated by sotalol. Prior to admission, pt was reportedly independent in ADLs and SPT. Pt currently is completing ADLs with up to DEP assist, unable to perform bed mobility or OOB mobility d/t pain and edema. Pt presented with pressure ulcers d/t limited mobility. Pt would benefit from SNF admission to address remaining deficits prior to safe return home. Pt would benefit from continued IP OT services during their stay, and discharge to SNF.     Complexity: Moderate   Prognosis: Good   Barriers: strength, endurance, edema, pain     Plan:  Plan: 3+/week    Treatment to include: Strengthening, ROM, Balance Training, Functional Mobility Training, Endurance Training, Gait Training, Pain Management, Safety Education and Training, Patient+Caregiver Education and Training, Equipment Evaluation Education and Procurement, Positioning, Self Care Training, Home Management Training, Coordination Training    Pt would benefit from continued edu on: OOB activity and falls prevention   Adaptive Equipment Recommendations: Defer to next LOC     Goals:  Time frame for goals: 2 weeks  Pt will complete feeding tasks with independence at seated level  Pt will complete grooming tasks with Velia at standing level with chair nearby   Pt will complete toileting tasks with ModA using BSC  Pt will complete UB dressing tasks with ModA seated EOB   Pt will complete LB dressing tasks with ModA seated EOB   Pt will complete UB bathing tasks with ModA seated and AE PRN   Pt will complete LB bathing tasks with MAxA seated and AE PRN   Pt will complete therapeutic exercise/activity to increase independence in ADL/IADL function  Pt will practice functional transfers and mobility with AD for increased safety and independence    Time:   Time in: 1026  Time out: [facility]  Treatment Minutes: 5  Evaluation Minutes: 10  Total time: 15    Electronically signed by:    ANI Simmons/L   License: [id number]  27/41/1823, 10:31 AM

## 2022-10-12 NOTE — DISCHARGE INSTRUCTIONS
Increase your torsemide dose over the next 3 days by doubling the dose in the morning and in the evening. Call cardiology tomorrow for follow-up.

## 2022-10-12 NOTE — ED TRIAGE NOTES
Pt arrives to ED via EMS c/o leg swelling. EMS states he always has leg swelling but per pt it is getting worse over the last few days. Pt states the swelling is now into his scrotum.

## 2022-10-12 NOTE — CONSULTS
[facility] Wound Ostomy Continence Nurse  Consult Note       Monalisa Noland  AGE: 67 y.o. GENDER: male  : 1950  TODAY'S DATE:  10/12/2022    Subjective:     Reason for  Evaluation and Assessment: wound care evalLea Noland is a 67 y.o. male referred by:   [x] Physician  [] Nursing  [] Other:     Wound Identification:  Wound Type: diabetic and non-healing surgical  Contributing Factors: edema, diabetes, chronic pressure, and decreased mobility        PAST MEDICAL HISTORY        Diagnosis Date    Acid reflux     Acute MI ([address] ,     Arthritis     Back    Broken teeth     Upper Front    CAD (coronary artery disease)     Sees Dr. Mandy Mann [facility])     per old chart    CHF (congestive heart failure) ([facility]     Chronic back pain     Chronic kidney disease     STAGE 3 KIDNEY FAILURE- \"from my diabetes- do not follow with any one- have seen Dr Joseph Giraldo in the past\"    Diabetes mellitus ([facility] Dx 1965    per old chart pt has been diabetic since age 13    Diabetic neuropathy ([facility]     \"in my feet\"    H/O cardiovascular stress test 2016    H/O Doppler ultrasound 2018    Moderate disease of the right lower extremity with an JALEN of 0.72. Moderate to severe disease of the left lower extremity with an JALEN of 0.55.     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    Neuropathy     both feet    neuropathy     PAD (peripheral artery disease) ([address] 2018    PVD (peripheral vascular disease) ([address]     Sick sinus syndrome (HCC)     Sleep apnea     \"sleep study 3 yrs ago- could not tolerate the cpap made me too dry\" Spinal stenosis     Teeth missing     Upper And Lower    Type 2 diabetes mellitus without complication ([facility]     WD-Chronic foot ulcer, left, with necrosis of bone ([facility] 11/12/2021       PAST SURGICAL HISTORY    Past Surgical History:   Procedure Laterality Date    CARDIAC CATHETERIZATION      per old chart done 10/2014    CARDIAC CATHETERIZATION  07/14/2017    with angiography of leg    CARDIAC CATHETERIZATION  11/20/2018    PATENT STENTS OF ALL THREE MAJOR VESSELS    CARDIAC DEFIBRILLATOR PLACEMENT  06/04/2010    [employer] [address] Defibrillator Implanted    COLECTOMY Right 08/26/2016    laparascopic; robotic assisted    COLONOSCOPY  08/04/2016    CORONARY ANGIOPLASTY      \"15 Heart Stents\"    CORONARY ANGIOPLASTY WITH STENT PLACEMENT      per old chart had angio with stent to circumflex and obtuse marginal artery at [facility] 5/2010( old chart also gives hx of stent placement done 2000,2004 and 2005)    DENTAL SURGERY      Teeth Extracted In Past    IR TUNNELED CATHETER PLACEMENT GREATER THAN 5 YEARS  6/14/2021    IR TUNNELED CATHETER PLACEMENT GREATER THAN 5 YEARS 6/14/2021 [facility] SPECIAL PROCEDURES    PACEMAKER PLACEMENT  06/04/2010    [employer] [address] DR Defibrillator Implanted    TOE AMPUTATION Right 09/12/2017    Rt 3rd toe    TOE AMPUTATION Right 01/09/2018     Right 5th toe amputation and Toenails trimmed left 2,3,4 and 5th toes    TOE AMPUTATION Left 12/26/2020    LEFT GREAT TOE AMPUTATION performed by Lorie Palmer MD at [address] Left 7/26/2022    LEFT SECOND TOE AMPUTATION performed by Lupe James MD at [address]      per old chart had balloon angioplasty right superfical femoral artery,right popliteal artery,,right ant.tibial artery, right tibioperoneal trunk, and right post.tibial artery wna stent placement right popliteal artery and superfical femoral artery 7/2012       FAMILY HISTORY    Family History   Problem Relation Age of Onset    Diabetes Mother     Stroke Mother     High Blood Pressure Mother     Vision Loss Mother     Cancer Father         Prostate Cancer    Diabetes Sister     Neuropathy Sister     Other Sister         \"Breathing Problems\"    Heart Disease Sister     Early Death Sister 62        Heart Complications    Cancer Brother         \"Stomach Cancer\"    High Blood Pressure Brother     Diabetes Brother     Heart Disease Brother     High Blood Pressure Brother     Cancer Son         \"Testicle Cancer\"       SOCIAL HISTORY    Social History     Tobacco Use    Smoking status: Former     Packs/day: 0.25     Years: 36.00     Pack years: 9.00     Types: Cigars, Cigarettes     Start date: 1980     Quit date: 10/22/2021     Years since quittin.9    Smokeless tobacco: Never    Tobacco comments:     Client states he has stopped smoking   Vaping Use    Vaping Use: Never used   Substance Use Topics    Alcohol use: No    Drug use: Yes     Types: Marijuana (Weed)       ALLERGIES    Allergies   Allergen Reactions    Pcn [Penicillins] Hives    Fentanyl Itching       MEDICATIONS    No current facility-administered medications on file prior to encounter. Current Outpatient Medications on File Prior to Encounter   Medication Sig Dispense Refill    oxyCODONE-acetaminophen (PERCOCET) 5-325 MG per tablet Take 1 tablet by mouth 2 times daily as needed for Pain. diclofenac sodium (VOLTAREN) 1 % GEL APPLY 4 G TOPICALLY 2 TIMES DAILY 100 g 7    amLODIPine (NORVASC) 10 MG tablet TAKE 1 TABLET BY MOUTH EVERY DAY 90 tablet 3    lisinopril (PRINIVIL;ZESTRIL) 5 MG tablet Take 1 tablet by mouth daily 30 tablet 0    metOLazone (ZAROXOLYN) 2.5 MG tablet Take 1 tablet by mouth daily 30 tablet 0    torsemide (DEMADEX) 20 MG tablet Take 2 tablets by mouth daily 60 tablet 0    gabapentin (NEURONTIN) 300 MG capsule TAKE 1 CAPSULE BY MOUTH 3 TIMES DAILY FOR 90 DAYS.  90 capsule 3    metaxalone (SKELAXIN) 800 MG tablet Take 800 mg by mouth 3 times daily      SPIRIVA HANDIHALER 18 MCG inhalation capsule       insulin lispro, 1 Unit Dial, 100 UNIT/ML SOPN Inject into the skin 2 times daily Sliding scale dose      atorvastatin (LIPITOR) 40 MG tablet Take 1 tablet by mouth nightly 30 tablet 3    carvedilol (COREG) 3.125 MG tablet Take 1 tablet by mouth 2 times daily 60 tablet 3    albuterol sulfate HFA (VENTOLIN HFA) 108 (90 Base) MCG/ACT inhaler Inhale 2 puffs into the lungs every 4 hours as needed for Wheezing 1 Inhaler 3    Calcium Alginate (ALGICELL CALCIUM DRESSING 4\"X8) MISC Apply 1 Device topically daily 30 each 3    PROMOGRAN COREY WOUND DRESSING MATRIX Apply topically Apply to left daily and cover with gauze 1 Package 3    clopidogrel (PLAVIX) 75 MG tablet Take 1 tablet by mouth daily 30 tablet 11         Objective:      BP (!) 152/71   Pulse 63   Temp 97.6 °F (36.4 °C) (Oral)   Resp 16   Ht 6' (1.829 m)   Wt 274 lb 9.6 oz (124.6 kg)   SpO2 100%   BMI 37.24 kg/m²   Pablo Risk Score: Pablo Scale Score: 15    LABS    CBC:   Lab Results   Component Value Date/Time    WBC 5.5 10/11/2022 11:33 PM    RBC 3.44 10/11/2022 11:33 PM    HGB 9.3 10/11/2022 11:33 PM    HCT 30.4 10/11/2022 11:33 PM    MCV 88.4 10/11/2022 11:33 PM    MCH 27.0 10/11/2022 11:33 PM    MCHC 30.6 10/11/2022 11:33 PM    RDW 16.3 10/11/2022 11:33 PM     10/11/2022 11:33 PM    MPV 10.1 10/11/2022 11:33 PM     CMP:    Lab Results   Component Value Date/Time     10/12/2022 06:28 AM    K 4.6 10/12/2022 06:28 AM    K 5.1 01/10/2018 04:32 AM     10/12/2022 06:28 AM    CO2 23 10/12/2022 06:28 AM    BUN 33 10/12/2022 06:28 AM    CREATININE 2.2 10/12/2022 06:28 AM    GFRAA 36 10/12/2022 06:28 AM    LABGLOM 30 10/12/2022 06:28 AM    GLUCOSE 110 10/12/2022 06:28 AM    PROT 6.4 10/11/2022 11:33 PM    PROT 6.3 01/21/2013 12:10 PM    LABALBU 2.6 10/11/2022 11:33 PM    CALCIUM 8.2 10/12/2022 06:28 AM    BILITOT 0.9 10/11/2022 11:33 PM    ALKPHOS 70 10/11/2022 11:33 PM    AST 35 10/11/2022 11:33 PM    ALT 11 10/11/2022 11:33 PM     Albumin:    Lab Results   Component Value Date/Time    LABALBU 2.6 10/11/2022 11:33 PM     PT/INR:    Lab Results   Component Value Date/Time    PROTIME 13.3 08/30/2021 08:40 AM    PROTIME 11.2 09/08/2011 06:02 PM    INR 1.03 08/30/2021 08:40 AM     HgBA1c:    Lab Results   Component Value Date/Time    LABA1C 7.1 10/12/2022 06:28 AM         Assessment:     Patient Active Problem List   Diagnosis    PAD (peripheral artery disease) ([facility])    Chronic coronary artery disease    Biventricular ICD (implantable cardioverter-defibrillator) in place    Chronic combined systolic and diastolic heart failure ([facility])    Chronic kidney disease, stage III (moderate) ([facility])    Mixed hyperlipidemia    Sick sinus syndrome ([facility])    Type 2 diabetes mellitus with diabetic polyneuropathy ([facility])    Spinal stenosis of lumbar region    Obesity, Class I, BMI 30-34.9    S/P partial colectomy    Tubulovillous adenoma of colon    Microalbuminuria    WD-PVD (peripheral vascular disease) ([facility])    Limb ischemia    Necrotic toes ([facility])    Toe gangrene ([facility])    Diabetic foot infection ([facility]    Chronic kidney disease (CKD) stage G3a/A2, moderately decreased glomerular filtration rate (GFR) between 45-59 mL/min/1.73 square meter and albuminuria creatinine ratio between  mg/g ([facility])    Edema    ICD (implantable cardioverter-defibrillator) battery depletion    Hyperkalemia    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[facility])    Ischemia of toe    Acute kidney injury ([facility]    Fluid overload    DM (diabetes mellitus) ([facility])    Precordial pain    Acute chest pain    Unstable angina ([facility])    Chronic kidney disease (CKD) stage G3a/A3, moderately decreased glomerular filtration rate (GFR) between 45-59 mL/min/1.73 square meter and albuminuria creatinine ratio greater than 300 mg/g ([facility])    Cardiomyopathy ([facility])    Diabetic neuropathy ([facility])    HTN (hypertension)    Epigastric pain    Acute on chronic congestive heart failure ([facility])    Leg edema    Acute on chronic systolic CHF (congestive heart failure) (HCC)    Diabetic foot ulcer with osteomyelitis ([address]    Visit for wound check    Moderate malnutrition ([address]    Long term (current) use of antibiotics    WD-Diabetic ulcer of toe of right foot associated with type 2 diabetes mellitus, with fat layer exposed ([address]    Diabetic ulcer of toe associated with type 2 diabetes mellitus, with bone involvement without evidence of necrosis ([address]    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    Persistent wound pain    Receiving intravenous antibiotic treatment as outpatient    Acute on chronic respiratory failure with hypoxemia ([address]    WD-Chronic foot ulcer, left, with necrosis of bone (HCC)    Acute on chronic HFrEF (heart failure with reduced ejection fraction) ([address]    Scrotal edema    Hypertensive urgency    Diabetic ulcer of right foot due to type 2 diabetes mellitus ([address]    Cellulitis of foot    Toe osteomyelitis ([address]    Heart failure exacerbated by sotalol ([address]    CHF (congestive heart failure), NYHA class I, acute on chronic, combined ([address]       Measurements:  Wound 07/06/17 Other (Comment) Toe (Comment  which one) (Active)   Number of days: 1923       Wound 12/10/20 Foot Left;Lateral fluid filled blister (Active)   Number of days: 670       Wound 10/30/21   #1 Left Dorsal Great Toe amp site  (Active)   Number of days: 347       Wound 10/30/21   #2 Left Dorsal Second Toe  (Active)   Number of days: 962       Wound 10/30/21 #3 Right Great Toe (Active)   Number of days: 347       Wound 02/25/22 #7 Right Dorsal 2nd Toe (Active)   Number of days: 228       Wound 07/19/22 Toe (Comment  which one) Anterior;Right #1 GR (Active)   Wound Image   10/12/22 0950   Wound Etiology Diabetic 10/12/22 0950   Dressing Status New dressing applied 10/12/22 0950   Wound Cleansed Cleansed with saline 10/12/22 0950   Dressing/Treatment Moist to dry; Hydrofiber Ag;ABD;Roll gauze 10/12/22 0950   Wound Length (cm) 2 cm 10/12/22 0950   Wound Width (cm) 2 cm 10/12/22 0950 Wound Depth (cm) 0.3 cm 10/12/22 0950   Wound Surface Area (cm^2) 4 cm^2 10/12/22 0950   Change in Wound Size % (l*w) -29.87 10/12/22 0950   Wound Volume (cm^3) 1.2 cm^3 10/12/22 0950   Wound Healing % -30 10/12/22 0950   Distance Tunneling (cm) 0 cm 10/12/22 0950   Tunneling Position ___ O'Clock 0 10/12/22 0950   Undermining Starts ___ O'Clock 0 10/12/22 0950   Undermining Ends___ O'Clock 0 10/12/22 0950   Undermining Maxium Distance (cm) 0 10/12/22 0950   Wound Assessment Pink/red;Slough 10/12/22 0950   Drainage Amount Moderate 10/12/22 0950   Drainage Description Serosanguinous; Yellow 10/12/22 0950   Odor None 10/12/22 0950   Dina-wound Assessment Maceration 10/12/22 0950   Margins Defined edges 10/12/22 0950   Wound Thickness Description not for Pressure Injury Full thickness 10/12/22 0950   Number of days: 85       Wound 07/19/22 Toe (Comment  which one) Anterior; Left #2 GR (Active)   Wound Image   10/12/22 0950   Wound Etiology Diabetic 10/12/22 0950   Dressing Status New dressing applied 10/12/22 0950   Wound Cleansed Cleansed with saline 10/12/22 0950   Dressing/Treatment Moist to dry; Hydrofiber Ag;ABD;Roll gauze 10/12/22 0950   Wound Length (cm) 0.6 cm 10/12/22 0950   Wound Width (cm) 1.5 cm 10/12/22 0950   Wound Depth (cm) 0.5 cm 10/12/22 0950   Wound Surface Area (cm^2) 0.9 cm^2 10/12/22 0950   Change in Wound Size % (l*w) 77.78 10/12/22 0950   Wound Volume (cm^3) 0.45 cm^3 10/12/22 0950   Wound Healing % -11 10/12/22 0950   Distance Tunneling (cm) 0 cm 10/12/22 0950   Tunneling Position ___ O'Clock 0 10/12/22 0950   Undermining Starts ___ O'Clock 0 10/12/22 0950   Undermining Ends___ O'Clock 0 10/12/22 0950   Undermining Maxium Distance (cm) 0 10/12/22 0950   Wound Assessment Pink/red;Slough 10/12/22 0950   Drainage Amount Moderate 10/12/22 0950   Drainage Description Serosanguinous; Yellow 10/12/22 0950   Odor None 10/12/22 0950   Dina-wound Assessment Intact 10/12/22 0950   Margins Defined edges 10/12/22 0950   Wound Thickness Description not for Pressure Injury Full thickness 10/12/22 0950   Number of days: 85       Wound 07/19/22 Toe (Comment  which one) Anterior; Left #3 2ND (Active)   Wound Etiology Diabetic 10/12/22 0950   Dressing Status New dressing applied 10/12/22 0950   Wound Cleansed Cleansed with saline 10/12/22 0950   Dressing/Treatment Moist to dry; Hydrofiber Ag 10/12/22 0950   Wound Length (cm) 2.5 cm 10/12/22 0950   Wound Width (cm) 2 cm 10/12/22 0950   Wound Depth (cm) 1.3 cm 10/12/22 0950   Wound Surface Area (cm^2) 5 cm^2 10/12/22 0950   Change in Wound Size % (l*w) 33.16 10/12/22 0950   Wound Volume (cm^3) 6.5 cm^3 10/12/22 0950   Wound Healing % -190 10/12/22 0950   Distance Tunneling (cm) 0 cm 10/12/22 0950   Tunneling Position ___ O'Clock 0 10/12/22 0950   Undermining Starts ___ O'Clock 0 10/12/22 0950   Undermining Ends___ O'Clock 0 10/12/22 0950   Undermining Maxium Distance (cm) 0 10/12/22 0950   Wound Assessment Pink/red;Slough 10/12/22 0950   Drainage Amount Moderate 10/12/22 0950   Drainage Description Serosanguinous; Yellow 10/12/22 0950   Odor None 10/12/22 0950   Dina-wound Assessment Intact 10/12/22 0950   Margins Defined edges 10/12/22 0950   Wound Thickness Description not for Pressure Injury Full thickness 10/12/22 0950   Number of days: 85       Wound 10/12/22 Right;Plantar;Lateral (Active)   Wound Image   10/12/22 0950   Wound Etiology Diabetic 10/12/22 0950   Dressing Status New drainage noted 10/12/22 0950   Wound Cleansed Cleansed with saline 10/12/22 0950   Dressing/Treatment Moist to dry; Hydrofiber Ag;ABD;Roll gauze 10/12/22 0950   Wound Length (cm) 0.6 cm 10/12/22 0950   Wound Width (cm) 2 cm 10/12/22 0950   Wound Depth (cm) 0.2 cm 10/12/22 0950   Wound Surface Area (cm^2) 1.2 cm^2 10/12/22 0950   Wound Volume (cm^3) 0.24 cm^3 10/12/22 0950   Distance Tunneling (cm) 0 cm 10/12/22 0950   Tunneling Position ___ O'Clock 0 10/12/22 0950   Undermining Starts ___ O'Clock 0 10/12/22 0950   Undermining Ends___ O'Clock 0 10/12/22 0950   Undermining Maxium Distance (cm) 0 10/12/22 0950   Wound Assessment Pink/red;Slough 10/12/22 0950   Drainage Amount Moderate 10/12/22 0950   Drainage Description Serosanguinous; Yellow 10/12/22 0950   Odor None 10/12/22 0950   Dina-wound Assessment Intact; Hyperkeratosis (callous) 10/12/22 0950   Margins Defined edges 10/12/22 0950   Wound Thickness Description not for Pressure Injury Full thickness 10/12/22 0950   Number of days: 0       Wound 10/12/22 Scrotum Posterior (Active)   Wound Image   10/12/22 0950   Wound Etiology Other 10/12/22 0950   Wound Cleansed Cleansed with saline 10/12/22 0950   Dressing/Treatment Moisture barrier 10/12/22 0950   Wound Length (cm) 6 cm 10/12/22 0950   Wound Width (cm) 4 cm 10/12/22 0950   Wound Depth (cm) 0.1 cm 10/12/22 0950   Wound Surface Area (cm^2) 24 cm^2 10/12/22 0950   Wound Volume (cm^3) 2.4 cm^3 10/12/22 0950   Distance Tunneling (cm) 0 cm 10/12/22 0950   Tunneling Position ___ O'Clock 0 10/12/22 0950   Undermining Starts ___ O'Clock 0 10/12/22 0950   Undermining Ends___ O'Clock 0 10/12/22 0950   Undermining Maxium Distance (cm) 0 10/12/22 0950   Wound Assessment Pink/red 10/12/22 0950   Drainage Amount Moderate 10/12/22 0950   Drainage Description Serous 10/12/22 0950   Odor None 10/12/22 0950   Dina-wound Assessment Intact;Edematous 10/12/22 0950   Margins Undefined edges 10/12/22 0950   Wound Thickness Description not for Pressure Injury Partial thickness 10/12/22 0950   Number of days: 0       Wound 10/12/22 Ischium Right (Active)   Wound Image   10/12/22 0950   Wound Etiology Other 10/12/22 0950   Wound Cleansed Cleansed with saline 10/12/22 0950   Dressing/Treatment Moisture barrier 10/12/22 0950   Wound Length (cm) 0.5 cm 10/12/22 0950   Wound Width (cm) 1 cm 10/12/22 0950   Wound Depth (cm) 0.1 cm 10/12/22 0950   Wound Surface Area (cm^2) 0.5 cm^2 10/12/22 0950   Wound Volume (cm^3) 0.05 cm^3 10/12/22 5779   Distance Tunneling (cm) 0 cm 10/12/22 0950   Tunneling Position ___ O'Clock 0 10/12/22 0950   Undermining Starts ___ O'Clock 0 10/12/22 0950   Undermining Ends___ O'Clock 0 10/12/22 0950   Undermining Maxium Distance (cm) 0 10/12/22 0950   Wound Assessment Pink/red 10/12/22 0950   Drainage Amount Small 10/12/22 0950   Drainage Description Serosanguinous 10/12/22 0950   Odor None 10/12/22 0950   Elinor-wound Assessment Intact 10/12/22 0950   Margins Defined edges 10/12/22 0950   Wound Thickness Description not for Pressure Injury Partial thickness 10/12/22 0950   Number of days: 0       Response to treatment:  With complaints of pain. Pain Assessment:  Severity:  moderate  Quality of pain: sore  Wound Pain Timing/Severity: wound care  Premedicated: no    Plan:     Plan of Care: Wound 07/19/22 Toe (Comment  which one) Anterior;Right #1 GR-Dressing/Treatment: Moist to dry, Hydrofiber Ag, ABD, Roll gauze  Wound 07/19/22 Toe (Comment  which one) Anterior; Left #2 GR-Dressing/Treatment: Moist to dry, Hydrofiber Ag, ABD, Roll gauze  Wound 07/19/22 Toe (Comment  which one) Anterior; Left #3 2ND-Dressing/Treatment: Moist to dry, Hydrofiber Ag (abd, roll gauze)  Wound 10/12/22 Right;Plantar;Lateral-Dressing/Treatment: Moist to dry, Hydrofiber Ag, ABD, Roll gauze  Wound 10/12/22 Scrotum Posterior-Dressing/Treatment: Moisture barrier  Wound 10/12/22 Ischium Right-Dressing/Treatment: Moisture barrier      Patient in bed agreeable to wound care eval. Pt has chronic diabetic/non-healing surgical wounds to rt great toe, lt great toe, lt 2nd toe, rt lateral plantar. Wounds with necrotic tissue-slough. Cleansed with NS. Measured and pictured. Left 2nd toe with depth of 1.3cm wound culture obtained. Applied ns moist gauze and Aquacel Ag. Recommend santyl and general surgery consult. Pt has been to the wound clinic for treatment. Heels with pink scar tissue. Pt is incontinent elinor care done and bed pad changed.  Pt became angry hitting the bed from pain when turned. Pt has wounds to scrotum and rt ischial from moisture/pressure. Measured and pictured. Applied moisture barrier cream. Pt turned to lt side with pillow support. Heels floated. Pt is at moderate risk for skin breakdown AEB brett. Follow brett orders. Atmos air pump on the bed. Specialty Bed Required : yes  [] Low Air Loss   [x] Pressure Redistribution  [] Fluid Immersion  [] Bariatric  [] Total Pressure Relief  [] Other:     Discharge Plan:  Placement for patient upon discharge: tbd  Hospice Care: no  Patient appropriate for Outpatient [address]: yes     Patient/Caregiver Teaching:  Level of patient/caregiver understanding able to: pt voiced understanding. Electronically signed by Darby Rodarte.  IFEOMA Miranda, on 10/12/2022 at 1:26 PM

## 2022-10-12 NOTE — ED NOTES
ED TO INPATIENT SBAR HANDOFF    Patient Name: Sasha Zaman   :  1950  67 y.o. MRN:  [id number]  Preferred Name     ED Room #:  ED26/ED-26  Family/Caregiver Present no   Restraints no   Sitter no   Sepsis Risk Score Sepsis Risk Score: 1.35    Situation  Code Status: Full Code No additional code details. Allergies: Pcn [penicillins] and Fentanyl  Weight: Patient Vitals for the past 96 hrs (Last 3 readings):   Weight   10/11/22 2250 255 lb (115.7 kg)     Arrived from: home  Chief Complaint:   Chief Complaint   Patient presents with    Leg Swelling     B/L     Hospital Problem/Diagnosis:  Principal Problem:    Heart failure exacerbated by sotalol ([facility]  Active Problems:    CHF (congestive heart failure), NYHA class I, acute on chronic, combined ([facility]  Resolved Problems:    * No resolved hospital problems. *    Imaging:   XR CHEST PORTABLE   Final Result   Mild pulmonary vascular congestion.            Abnormal labs:   Abnormal Labs Reviewed   CBC WITH AUTO DIFFERENTIAL - Abnormal; Notable for the following components:       Result Value    RBC 3.44 (*)     Hemoglobin 9.3 (*)     Hematocrit 30.4 (*)     MCHC 30.6 (*)     RDW 16.3 (*)     Segs Relative 70.7 (*)     Lymphocytes % 21.1 (*)     Monocytes % 6.2 (*)     All other components within normal limits   COMPREHENSIVE METABOLIC PANEL W/ REFLEX TO MG FOR LOW K - Abnormal; Notable for the following components:    Potassium 5.3 (*)     CO2 20 (*)     BUN 33 (*)     Creatinine 2.2 (*)     Calcium 8.2 (*)     Albumin 2.6 (*)     GFR Non- 30 (*)     GFR  36 (*)     All other components within normal limits   TROPONIN - Abnormal; Notable for the following components:    Troponin T 0.056 (*)     All other components within normal limits   BRAIN NATRIURETIC PEPTIDE - Abnormal; Notable for the following components:    Pro-BNP 8,657 (*)     All other components within normal limits     Critical values: no     Abnormal Assessment Findings:      Background  Hospital admissions in last 30 days?  no   History:   Past Medical History:   Diagnosis Date    Acid reflux     Acute MI ([address] 2004, 2008    Arthritis     Back    Broken teeth     Upper Front    CAD (coronary artery disease)     Sees Dr. Manjit Dolan [facility])     per old chart    CHF (congestive heart failure) ([facility]     Chronic back pain     Chronic kidney disease     STAGE 3 KIDNEY FAILURE- \"from my diabetes- do not follow with any one- have seen Dr Renetta Davis in the past\"    Diabetes mellitus ([facility] Dx 1965    per old chart pt has been diabetic since age 13    Diabetic neuropathy ([address]     \"in my feet\"    H/O cardiovascular stress test 08/25/2016    H/O Doppler ultrasound 09/27/2018    Moderate disease of the right lower extremity with an JALEN of 0.72.   Moderate to severe disease of the left lower extremity with an JALEN of 0.55.   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    Neuropathy     both feet    neuropathy     PAD (peripheral artery disease) ([address] 09/27/2018    PVD (peripheral vascular disease) ([address]     Sick sinus syndrome ([address]     Sleep apnea     \"sleep study 3 yrs ago- could not tolerate the cpap made me too dry\"    Spinal stenosis     Teeth missing     Upper And Lower    Type 2 diabetes mellitus without complication ([address]     WD-Chronic foot ulcer, left, with necrosis of bone ([address] 11/12/2021       Assessment    Vitals/MEWS: MEWS Score: 1  Level of Consciousness: Alert (0)   Vitals:    10/11/22 2250 10/11/22 2338 10/12/22 0002 10/12/22 0032   BP: (!) 112/58 (!) 141/70 (!) 160/71 (!) 181/87   Pulse: 67 65 62 63   Resp: 18 10 17 10   Temp: 98.5 °F (36.9 °C)      SpO2: 96% 96% 100% 94%   Weight: 255 lb (115.7 kg)      Height: 6' (1.829 m)        FiO2 (%):    O2 Flow Rate: O2 Device: Nasal cannula O2 Flow Rate (L/min): 3 L/min  Cardiac Rhythm:    Pain Assessment:   [] Verbal [] Laura Ready Scale  Pain Scale: Pain Assessment  Pain Assessment: 0-10  Pain Level: 7  Pain Location: Foot  Pain Orientation: Right, Left  Last documented pain score (0-10 scale) Pain Level: 7  Last documented pain medication administered:    Mental Status: oriented and alert  C-SSRS: Risk of Suicide: No Risk  Bedside swallow:    Jalen Coma Scale (GCS): [place] Coma Scale  Eye Opening: Spontaneous  Best Verbal Response: Oriented  Best Motor Response: Obeys commands  [place] Coma Scale Score: 15  Active LDA's:    PO Status: Regular  Pertinent or High Risk Medications/Drips: no   o If Yes, please provide details:    Pending Blood Product Administration: no     Blood Cultures: see ED pt care timeline or ED Event log    Recommendation    Pending orders    Plan for Discharge (if known): SNF  Additional Comments: Pt is alert and oriented. Pt was going to be discharged from the ED but when trying to transfer pt to a wheelchair he was unable to maintain a standing position with the help of three staff. Pt daughter then arrived and told staff he has not been able to stand or pivot or walk. Pt will likely need placement at SNF. Pt had recent amputation of left toes. Daughter states wound is not healing well.         If any further questions, please call Sending RN at [phone]  Electronically signed by: Electronically signed by Lloyd Canales RN on 10/12/2022 at [address] [place] [address], RN  10/12/22 2158

## 2022-10-12 NOTE — PROGRESS NOTES
V2.0  [facility] Hospitalist Progress Note      Name:  Neris Powell /Age/Sex: 1950  (67 y.o. male)   MRN & CSN:  [id number] & [id number] Encounter Date/Time: 10/12/2022 9:59 AM EDT    Location:  8277/8853 PCP: Shaye Garcia Day: 2    Assessment and Plan:   Neris Powell is a 67 y.o. male with pmh of CAD, CHF, stage III, T2DM who presents with shortness of breath, scrotal swelling, subsequently admitted for Heart failure exacerbated by sotalol ([facility]      Plan:  Volume overload in the setting of heart failure exacerbation likely exacerbated by sotalol.  - Bilateral lower extremity swelling  - Scrotal/penile swelling, progressive becoming worse, patient is on torsemide 20 mg twice daily.  - BNP 8600, patient received 40 mg IV Lasix in ED, will continue IV diuretic at a lower dose at this time. - 2D echo pending. --Remote interrogation of ICD report shows that it is consistent with normal dual-chamber MRI safe [employer] function with stable leads and appropriate battery status related to the device. - Monitor telemetry  - PT/OT evaluation, appreciate recommendations  - Lab work in a.m.  - Patient wears 3 L/NC of O2 at home    Multiple wounds to right/left foot/toes/scrotum  - General surgery consult  - Wound care following, cultures ordered/sent  - Dressing change per wound care recommendations    Chronic hypoxic respiratory failure  - 3 L/NC home O2, patient at baseline    Hypertension  --Continue on home meds, lisinopril on hold at this time    CAD  AICD  - Continue Plavix, statin, and beta-blocker  - CXR shows vascular congestion    Mixed hyperlipidemia  - Continue statin    Chronically elevated troponin  - Troponin on admission 0.056  - Denies chest pain    COPD  - Does not appear to be in exacerbation  - Continue home medication regimen    T2DM  --Hemoglobin A1c 7.1      Diet ADULT DIET; Regular;  Low Sodium (2 gm)   DVT Prophylaxis [x] Lovenox, []  Heparin, [] SCDs, [] Ambulation, [] Eliquis, [] Xarelto  [] Coumadin   Code Status Full Code   Disposition From: Home  Expected Disposition: Home  Estimated Date of Discharge: 2-3 days  Patient requires continued admission due to general surgery consult, wound care, volume overload   Surrogate Decision Maker/ POA Self     Subjective:     Chief Complaint: Leg Swelling (B/L)  Patient seen this a.m. at bedside, wound care team at bedside. Was able to physically see multiple wounds on patient, in which general surgery consult was placed. Wound care orders and cultures plan of care discussed as detailed above with patient verbalizing understanding. Review of Systems:    Review of Systems   Cardiovascular:  Positive for leg swelling. Genitourinary:  Positive for scrotal swelling. Musculoskeletal:  Positive for joint swelling. Skin:  Positive for wound. Right great toe; right lateral plantar open wound  Left great and second toe open wound  Scrotum wound  Right ischium wound   All other systems reviewed and are negative. Objective:   No intake or output data in the 24 hours ending 10/12/22 0959     Vitals:   Vitals:    10/12/22 0845   BP:    Pulse: 63   Resp:    Temp:    SpO2:        Physical Exam:     General: NAD, cooperative, interactive with care  Eyes: EOMI,   ENT: neck supple  Cardiovascular: Regular rate. Respiratory: Clear to auscultation  Gastrointestinal: Soft, non tender  Genitourinary: no suprapubic tenderness  Musculoskeletal: No edema  Skin: warm, dry, open wounds to Right great toe; right lateral plantar open wound; Left great and second toe;open wound  Scrotum wound' Right ischium wound   Neuro: Alert. oriented x 3  Psych: Mood appropriate.             Medications:   Medications:    amLODIPine  10 mg Oral Daily    atorvastatin  40 mg Oral Nightly    Algicell Calcium Dressing 4\"x8  1 Device Apply externally Daily    carvedilol  3.125 mg Oral BID    clopidogrel  75 mg Oral Daily    gabapentin  300 mg Oral TID    metaxalone  800 mg Oral TID    tiotropium  2 puff Inhalation Daily    sodium chloride flush  5-40 mL IntraVENous 2 times per day    enoxaparin  30 mg SubCUTAneous BID    insulin lispro  0-4 Units SubCUTAneous TID WC    insulin lispro  0-4 Units SubCUTAneous Nightly    furosemide  20 mg IntraVENous BID      Infusions:    sodium chloride      dextrose       PRN Meds: albuterol sulfate HFA, 2 puff, Q4H PRN  oxyCODONE-acetaminophen, 1 tablet, BID PRN  sodium chloride flush, 5-40 mL, PRN  sodium chloride, , PRN  acetaminophen, 650 mg, Q4H PRN  ondansetron, 4 mg, Q8H PRN   Or  ondansetron, 4 mg, Q6H PRN  glucose, 4 tablet, PRN  dextrose bolus, 125 mL, PRN   Or  dextrose bolus, 250 mL, PRN  glucagon (rDNA), 1 mg, PRN  dextrose, , Continuous PRN        Labs      Recent Results (from the past 24 hour(s))   EKG 12 Lead    Collection Time: 10/11/22 11:29 PM   Result Value Ref Range    Ventricular Rate 69 BPM    Atrial Rate 69 BPM    QRS Duration 108 ms    Q-T Interval 406 ms    QTc Calculation (Bazett) 435 ms    R Axis 78 degrees    T Axis -41 degrees    Diagnosis       Atrial-paced rhythm  Nonspecific T wave abnormality  Abnormal ECG  When compared with ECG of 22-JAN-2022 11:33,  Electronic atrial pacemaker has replaced Sinus rhythm     CBC with Auto Differential    Collection Time: 10/11/22 11:33 PM   Result Value Ref Range    WBC 5.5 4.0 - 10.5 K/CU MM    RBC 3.44 (L) 4.6 - 6.2 M/CU MM    Hemoglobin 9.3 (L) 13.5 - 18.0 GM/DL    Hematocrit 30.4 (L) 42 - 52 %    MCV 88.4 78 - 100 FL    MCH 27.0 27 - 31 PG    MCHC 30.6 (L) 32.0 - 36.0 %    RDW 16.3 (H) 11.7 - 14.9 %    Platelets 906 589 - 160 K/CU MM    MPV 10.1 7.5 - 11.1 FL    Differential Type AUTOMATED DIFFERENTIAL     Segs Relative 70.7 (H) 36 - 66 %    Lymphocytes % 21.1 (L) 24 - 44 %    Monocytes % 6.2 (H) 0 - 4 %    Eosinophils % 1.3 0 - 3 %    Basophils % 0.5 0 - 1 %    Segs Absolute 3.9 K/CU MM    Lymphocytes Absolute 1.2 K/CU MM    Monocytes Absolute 0.3 K/CU MM    Eosinophils Absolute 0.1 K/CU MM    Basophils Absolute 0.0 K/CU MM    Nucleated RBC % 0.0 %    Total Nucleated RBC 0.0 K/CU MM    Total Immature Neutrophil 0.01 K/CU MM    Immature Neutrophil % 0.2 0 - 0.43 %   Comprehensive Metabolic Panel w/ Reflex to MG    Collection Time: 10/11/22 11:33 PM   Result Value Ref Range    Sodium 135 135 - 145 MMOL/L    Potassium 5.3 (H) 3.5 - 5.1 MMOL/L    Chloride 106 99 - 110 mMol/L    CO2 20 (L) 21 - 32 MMOL/L    BUN 33 (H) 6 - 23 MG/DL    Creatinine 2.2 (H) 0.9 - 1.3 MG/DL    Glucose 86 70 - 99 MG/DL    Calcium 8.2 (L) 8.3 - 10.6 MG/DL    Albumin 2.6 (L) 3.4 - 5.0 GM/DL    Total Protein 6.4 6.4 - 8.2 GM/DL    Total Bilirubin 0.9 0.0 - 1.0 MG/DL    ALT 11 10 - 40 U/L    AST 35 15 - 37 IU/L    Alkaline Phosphatase 70 40 - 129 IU/L    GFR Non-African American 30 (L) >60 mL/min/1.73m2    GFR  36 (L) >60 mL/min/1.73m2    Anion Gap 9 4 - 16   Troponin    Collection Time: 10/11/22 11:33 PM   Result Value Ref Range    Troponin T 0.056 (H) <0.01 NG/ML   Brain Natriuretic Peptide    Collection Time: 10/11/22 11:33 PM   Result Value Ref Range    Pro-BNP 8,657 (H) <300 PG/ML   Hemoglobin A1C    Collection Time: 10/12/22  6:28 AM   Result Value Ref Range    Hemoglobin A1C 7.1 (H) 4.2 - 6.3 %    eAG 157 mg/dL   Basic Metabolic Panel    Collection Time: 10/12/22  6:28 AM   Result Value Ref Range    Sodium 140 135 - 145 MMOL/L    Potassium 4.6 3.5 - 5.1 MMOL/L    Chloride 108 99 - 110 mMol/L    CO2 23 21 - 32 MMOL/L    Anion Gap 9 4 - 16    BUN 33 (H) 6 - 23 MG/DL    Creatinine 2.2 (H) 0.9 - 1.3 MG/DL    Glucose 110 (H) 70 - 99 MG/DL    Calcium 8.2 (L) 8.3 - 10.6 MG/DL    GFR Non-African American 30 (L) >60 mL/min/1.73m2    GFR  36 (L) >60 mL/min/1.73m2   POCT Glucose    Collection Time: 10/12/22  8:55 AM   Result Value Ref Range    POC Glucose 100 (H) 70 - 99 MG/DL        Imaging/Diagnostics Last 24 Hours   XR CHEST PORTABLE    Result Date: 10/11/2022  EXAMINATION: ONE XRAY VIEW OF THE CHEST 10/11/2022 11:22 pm COMPARISON: June 22, 2022 HISTORY: ORDERING SYSTEM PROVIDED HISTORY: chest pain TECHNOLOGIST PROVIDED HISTORY: Reason for exam:->chest pain Reason for Exam: chest pain FINDINGS: There is mild pulmonary vascular congestion. No effusion is identified. The heart size is normal.  A pulse generator is present over the left anterior chest wall with 2 leads projecting over the heart. Mild pulmonary vascular congestion.        Electronically signed by Ane Boast, APRN - CNP on 10/12/2022 at 9:59 AM

## 2022-10-12 NOTE — ED PROVIDER NOTES
Triage Chief Complaint:   Leg Swelling (B/L)    [place]:  Lissett Golden is a 67 y.o. male that presents with main complaint of bilateral lower extremity swelling and scrotal swelling that has progressed over the past 4 to 5 days. States he has had some mild increase in shortness of breath as well from baseline. States that he has been compliant with his medications. Denies any chest pain, fever, cough. States that he has increasing pain in his scrotum and extremity secondary to the swelling. Denies any other acute complaints. ROS:  At least 10 systems reviewed and otherwise acutely negative except as in the [address] Past Medical History:   Diagnosis Date    Acid reflux     Acute MI ([facility] 2004, 2008    Arthritis     Back    Broken teeth     Upper Front    CAD (coronary artery disease)     Sees Dr. Angeles Marroquin [facility])     per old chart    CHF (congestive heart failure) ([facility]     Chronic back pain     Chronic kidney disease     STAGE 3 KIDNEY FAILURE- \"from my diabetes- do not follow with any one- have seen Dr Enma Mauricio in the past\"    Diabetes mellitus [facility]) Dx 1965    per old chart pt has been diabetic since age 13    Diabetic neuropathy ([facility]     \"in my feet\"    H/O cardiovascular stress test 08/25/2016    H/O Doppler ultrasound 09/27/2018    Moderate disease of the right lower extremity with an JALEN of 0.72. Moderate to severe disease of the left lower extremity with an JALEN of 0.55.     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    Neuropathy     both feet    neuropathy     PAD (peripheral artery disease) ([facility] 09/27/2018    PVD (peripheral vascular disease) ([facility]     Sick sinus syndrome (HCC)     Sleep apnea     \"sleep study 3 yrs ago- could not tolerate the cpap made me too dry\"    Spinal stenosis     Teeth missing     Upper And Lower    Type 2 diabetes mellitus without complication ([facility]     WD-Chronic foot ulcer, left, with necrosis of bone ([facility] 11/12/2021     Past Surgical History:   Procedure Laterality Date    CARDIAC CATHETERIZATION      per old chart done 10/2014    CARDIAC CATHETERIZATION  07/14/2017    with angiography of leg    CARDIAC CATHETERIZATION  11/20/2018    PATENT STENTS OF ALL THREE MAJOR VESSELS    CARDIAC DEFIBRILLATOR PLACEMENT  06/04/2010    [employer] [address] DR Defibrillator Implanted    COLECTOMY Right 08/26/2016    laparascopic; robotic assisted    COLONOSCOPY  08/04/2016    CORONARY ANGIOPLASTY      \"15 Heart Stents\"    CORONARY ANGIOPLASTY WITH STENT PLACEMENT      per old chart had angio with stent to circumflex and obtuse marginal artery at [facility] 5/2010( old chart also gives hx of stent placement done 2000,2004 and 2005)    DENTAL SURGERY      Teeth Extracted In Past    IR TUNNELED CATHETER PLACEMENT GREATER THAN 5 YEARS  6/14/2021    IR TUNNELED CATHETER PLACEMENT GREATER THAN 5 YEARS 6/14/2021 [facility]    PACEMAKER PLACEMENT  06/04/2010    [employer] [address] DR Defibrillator Implanted    TOE AMPUTATION Right 09/12/2017    Rt 3rd toe    TOE AMPUTATION Right 01/09/2018     Right 5th toe amputation and Toenails trimmed left 2,3,4 and 5th toes    TOE AMPUTATION Left 12/26/2020    LEFT GREAT TOE AMPUTATION performed by Lorie Palmer MD at [address] Left 7/26/2022    LEFT SECOND TOE AMPUTATION performed by Lupe James MD at [address] [place], [place] [address]      per old chart had balloon angioplasty right superfical femoral artery,right popliteal artery,,right ant.tibial artery, right tibioperoneal trunk, and right post.tibial artery wna stent placement right popliteal artery and tablet 10 mg  1 tablet Oral Daily Torrey Lindsey MD        atorvastatin (LIPITOR) tablet 40 mg  40 mg Oral Nightly Torrey Lindsey MD        Algicell Calcium Dressing 4\"x8 MISC 1 Device  1 Device Apply externally Daily Torrey Lindsey MD        carvedilol (COREG) tablet 3.125 mg  3.125 mg Oral BID Torrey Lindsey MD        clopidogrel (PLAVIX) tablet 75 mg  75 mg Oral Daily Torrey Lindsey MD        gabapentin (NEURONTIN) capsule 300 mg  300 mg Oral TID Torrey Lindsey MD        metaxalone (SKELAXIN) tablet 800 mg  800 mg Oral TID Torrey Lindsey MD        oxyCODONE-acetaminophen (PERCOCET) 5-325 MG per tablet 1 tablet  1 tablet Oral BID PRN Torrey Lindsey MD        tiotropium (SPIRIVA RESPIMAT) 2.5 MCG/ACT inhaler 2 puff  2 puff Inhalation Daily Torrey Lindsey MD        sodium chloride flush 0.9 % injection 5-40 mL  5-40 mL IntraVENous 2 times per day Torrey Lindsey MD        sodium chloride flush 0.9 % injection 5-40 mL  5-40 mL IntraVENous PRN Torrey Lindsey MD        0.9 % sodium chloride infusion   IntraVENous PRN Torrey Lindsey MD        enoxaparin Sodium (LOVENOX) injection 30 mg  30 mg SubCUTAneous BID Torrey Lindsey MD        acetaminophen (TYLENOL) tablet 650 mg  650 mg Oral Q4H PRN Torrey Lindsey MD        ondansetron (ZOFRAN-ODT) disintegrating tablet 4 mg  4 mg Oral Q8H PRN Torrey Lindsey MD        Or    ondansetron (ZOFRAN) injection 4 mg  4 mg IntraVENous Q6H PRN Torrey Lindsey MD         Current Outpatient Medications   Medication Sig Dispense Refill    oxyCODONE-acetaminophen (PERCOCET) 5-325 MG per tablet Take 1 tablet by mouth 2 times daily as needed for Pain.       diclofenac sodium (VOLTAREN) 1 % GEL APPLY 4 G TOPICALLY 2 TIMES DAILY 100 g 7    amLODIPine (NORVASC) 10 MG tablet TAKE 1 TABLET BY MOUTH EVERY DAY 90 tablet 3    lisinopril (PRINIVIL;ZESTRIL) 5 MG tablet Take 1 tablet by mouth daily 30 tablet 0    metOLazone (ZAROXOLYN) 2.5 MG tablet Take 1 tablet by mouth daily 30 tablet 0    torsemide (DEMADEX) 20 MG tablet Take 2 tablets by mouth daily 60 tablet 0    gabapentin (NEURONTIN) 300 MG capsule TAKE 1 CAPSULE BY MOUTH 3 TIMES DAILY FOR 90 DAYS. 90 capsule 3    metaxalone (SKELAXIN) 800 MG tablet Take 800 mg by mouth 3 times daily      SPIRIVA HANDIHALER 18 MCG inhalation capsule       insulin lispro, 1 Unit Dial, 100 UNIT/ML SOPN Inject into the skin 2 times daily Sliding scale dose      atorvastatin (LIPITOR) 40 MG tablet Take 1 tablet by mouth nightly 30 tablet 3    carvedilol (COREG) 3.125 MG tablet Take 1 tablet by mouth 2 times daily 60 tablet 3    albuterol sulfate HFA (VENTOLIN HFA) 108 (90 Base) MCG/ACT inhaler Inhale 2 puffs into the lungs every 4 hours as needed for Wheezing 1 Inhaler 3    Calcium Alginate (ALGICELL CALCIUM DRESSING 4\"X8) MISC Apply 1 Device topically daily 30 each 3    PROMOGRAN COREY WOUND DRESSING MATRIX Apply topically Apply to left daily and cover with gauze 1 Package 3    clopidogrel (PLAVIX) 75 MG tablet Take 1 tablet by mouth daily 30 tablet 11     Allergies   Allergen Reactions    Pcn [Penicillins] Hives    Fentanyl Itching       Nursing Notes Reviewed    Physical Exam:  ED Triage Vitals [10/11/22 2250]   Enc Vitals Group      BP (!) 112/58      Heart Rate 67      Resp 18      Temp 98.5 °F (36.9 °C)      Temp src       SpO2 96 %      Weight 255 lb (115.7 kg)      Height 6' (1.829 m)      Head Circumference       Peak Flow       Pain Score       Pain Loc       Pain Edu? Excl. in [address]? GENERAL APPEARANCE: Awake and alert. Cooperative. No acute distress. HEAD: Normocephalic. Atraumatic. EYES: EOM's grossly intact. Sclera anicteric. ENT: Mucous membranes are moist. Tolerates saliva. No trismus. NECK: Supple. No meningismus. Trachea midline. HEART: RRR. Radial pulses 2+. LUNGS: Respirations unlabored. Lung sounds diminished bilaterally  ABDOMEN: Soft. Non-tender. No guarding or rebound.   : Severe scrotal and penile edema  EXTREMITIES: No acute deformities. Skin lichenification and edema in lower extremities, dressings in place to feet bilaterally  SKIN: Warm and dry. NEUROLOGICAL: No gross facial drooping. Moves all 4 extremities spontaneously. PSYCHIATRIC: Normal mood.     I have reviewed and interpreted all of the currently available lab results from this visit (if applicable):  Results for orders placed or performed during the hospital encounter of 10/11/22   CBC with Auto Differential   Result Value Ref Range    WBC 5.5 4.0 - 10.5 K/CU MM    RBC 3.44 (L) 4.6 - 6.2 M/CU MM    Hemoglobin 9.3 (L) 13.5 - 18.0 GM/DL    Hematocrit 30.4 (L) 42 - 52 %    MCV 88.4 78 - 100 FL    MCH 27.0 27 - 31 PG    MCHC 30.6 (L) 32.0 - 36.0 %    RDW 16.3 (H) 11.7 - 14.9 %    MPV 10.1 7.5 - 11.1 FL    Differential Type AUTOMATED DIFFERENTIAL     Segs Relative 70.7 (H) 36 - 66 %    Lymphocytes % 21.1 (L) 24 - 44 %    Monocytes % 6.2 (H) 0 - 4 %    Eosinophils % 1.3 0 - 3 %    Basophils % 0.5 0 - 1 %    Segs Absolute 3.9 K/CU MM    Lymphocytes Absolute 1.2 K/CU MM    Monocytes Absolute 0.3 K/CU MM    Eosinophils Absolute 0.1 K/CU MM    Basophils Absolute 0.0 K/CU MM    Nucleated RBC % 0.0 %    Total Nucleated RBC 0.0 K/CU MM    Total Immature Neutrophil 0.01 K/CU MM    Immature Neutrophil % 0.2 0 - 0.43 %   Comprehensive Metabolic Panel w/ Reflex to MG   Result Value Ref Range    Sodium 135 135 - 145 MMOL/L    Potassium 5.3 (H) 3.5 - 5.1 MMOL/L    Chloride 106 99 - 110 mMol/L    CO2 20 (L) 21 - 32 MMOL/L    BUN 33 (H) 6 - 23 MG/DL    Creatinine 2.2 (H) 0.9 - 1.3 MG/DL    Glucose 86 70 - 99 MG/DL    Calcium 8.2 (L) 8.3 - 10.6 MG/DL    Albumin 2.6 (L) 3.4 - 5.0 GM/DL    Total Protein 6.4 6.4 - 8.2 GM/DL    Total Bilirubin 0.9 0.0 - 1.0 MG/DL    ALT 11 10 - 40 U/L    AST 35 15 - 37 IU/L    Alkaline Phosphatase 70 40 - 129 IU/L    GFR Non-African American 30 (L) >60 mL/min/1.73m2    GFR  36 (L) >60 mL/min/1.73m2    Anion Gap 9 4 - 16   Troponin   Result Value Ref Range    Troponin T 0.056 (H) <0.01 NG/ML   Brain Natriuretic Peptide   Result Value Ref Range    Pro-BNP 8,657 (H) <300 PG/ML   EKG 12 Lead   Result Value Ref Range    Ventricular Rate 69 BPM    Atrial Rate 69 BPM    QRS Duration 108 ms    Q-T Interval 406 ms    QTc Calculation (Bazett) 435 ms    R Axis 78 degrees    T Axis -41 degrees    Diagnosis       Atrial-paced rhythm  Nonspecific T wave abnormality  Abnormal ECG  When compared with ECG of 22-JAN-2022 11:33,  Electronic atrial pacemaker has replaced Sinus rhythm        Radiographs (if obtained):  [] The following radiograph was interpreted by myself in the absence of a radiologist:  [x] Radiologist's Report Reviewed:    EKG (if obtained): (All EKG's are interpreted by myself in the absence of a cardiologist)  Paced rhythm with normal axis. Lateral T wave inversions without ST elevation or other abnormal ST changes. No pathologic Q waves. QTC is normal.    MDM:  Plan of care is discussed thoroughly with the patient and family if present. If performed, all imaging and lab work also discussed with patient. All relevant prior results and chart reviewed if available. Patient presents as above. He is in no acute distress. Vital signs within appropriate ranges. Presents with acute on chronic lower extremity swelling extending to the scrotum. No evidence of underlying infectious etiology at this time. Patient is afebrile. He has some chronic shortness of breath but no chest pain at this time. EKG does not show any new ischemic changes. Metabolic work-up shows chronic renal insufficiency without any significant electrolyte disturbances. Troponin and BNP are chronically elevated but I have low suspicion for ACS. Patient given dose of IV Lasix here. I was hopeful that we would be able to get the patient home but he is significantly debilitated given his lower extremity edema. It takes 3-4 staff members to even get him pivoted into a chair. Daughter at bedside states that she is unable to help him get around at home. He will need to be admitted for further diuresis. Clinical Impression:  1. Leg swelling    2.  Acute on chronic heart failure, unspecified heart failure type ([address]      (Please note that portions of this note may have been completed with a voice recognition program. Efforts were made to edit the dictations but occasionally words are mis-transcribed.)    MD Michelle Hamm MD  10/12/22 Mallory Velarde MD  10/12/22 [id number]

## 2022-10-12 NOTE — H&P
V2.0  History and Physical      Name:  Nicolasa Sanchez /Age/Sex: 1950  (67 y.o. male)   MRN & CSN:  [id number] & [id number] Encounter Date/Time: 10/12/2022 1:43 AM EDT   Location:  ED26/ED-26 PCP: Carroll Ozuna Day: 2    Assessment and Plan:   Nicolasa Sanchez is a 67 y.o. male with a pmh of hypertension, hyperlipidemia, CAD, CHF, CKD stage III, and type II DM who presents with shortness of breath and scrotal swelling. Hospital Problems             Last Modified POA    * (Principal) Heart failure exacerbated by sotalol ([address] 10/12/2022 Yes       CHF exacerbation  Volume overload  Bilateral lower extremity swelling  Chronic hypoxic respiratory failure  Scrotal swelling -adding to debility and interfering with movement  Patient with known history of diastolic heart failure, chest x-ray with vascular congestion, scrotal and penile swelling that has been progressively getting worse, patient on torsemide at home 20 mg twice daily, BNP elevated on admission at 8600, Lasix IV 40 mg grams once in the ED given. -Strict ins and outs  -Daily weights  -Telemetry  -We will monitor lites and replete as needed  -PT OT  -We will redose Lasix as appropriate after monitoring response, patient would likely need a higher dose of Lasix in the setting of CKD and creatinine of 2.2.   -Patient on stable oxygen requirements with home O2 at 3 L on admission     Hypertension-restart home meds, will hold lisinopril in the setting of diuresis    History of coronary artery disease  ICD  Chronically elevated troponin  Continue Plavix and Coreg. Troponin admission elevated at 0.056, although chronically elevated troponin in the setting of CKD, no chest pain, no acute EKG changes noted.      CKD stage III  Hyperkalemia  Creatinine at baseline, patient with mild hyperkalemia to 5.3 on admission, EKG unremarkable for changes consistent with hyperkalemia, will monitor with BMPs, avoid nephrotoxins, hold lisinopril in the setting of diuresis. Hyperlipidemia-Home statin  Type II DM-hemoglobin A1c check pending, Accu-Cheks, hypoglycemia protocol  Hx of amputations on BL feet - wound care, last month  COPD-Home inhalers    Disposition:   Current Living situation: Home  Expected Disposition: Home  Estimated D/C: 1 to 2 days    Diet ADULT DIET; Regular; Low Sodium (2 gm)   DVT Prophylaxis [x] Lovenox, []  Heparin, [] SCDs, [] Ambulation,  [] Eliquis, [] Xarelto, [] Coumadin   Code Status Full Code   Surrogate Decision Maker/ POA      History from:     patient    History of Present Illness:     Chief Complaint: Heart failure exacerbated by sotalol ([facility]  Hubert Yip is a 67 y.o. male with a pmh of hypertension, hyperlipidemia, CAD, CHF, CKD stage III, and type II DM who presents with shortness of breath and scrotal swelling. Patient presenting with complaints of progressive shortness of breath in addition to bilateral lower extremity swelling, scrotal and penile swelling that has been getting worse to the point where he could barely move around the house. Patient is on torsemide at home 20 mg twice daily and has been taking the medication but with no significant change to his swelling. No chest pain. No fever no chills no rigors. No lightheadedness or dizziness. Positive orthopnea. In the ED blood pressure was noted to be hypertensive 795R to 179O systolic, no hypoxia noted, no tachycardia and patient was afebrile. Chest x-ray with vascular congestion  BNP elevated at 8600 , troponin chronically elevated at close to baseline in the setting of CKD, creatinine at baseline, mild hyperkalemia at 5.3.       Review of Systems: Need 10 Elements   Review of Systems    10 point review of systems negative except as above    Objective:   No intake or output data in the 24 hours ending 10/12/22 0143   Vitals:   Vitals:    10/11/22 2250 10/11/22 2338 10/12/22 0002 10/12/22 0032   BP: (!) 112/58 (!) 141/70 (!) 160/71 (!) 181/87 Pulse: 67 65 62 63   Resp: 18 10 17 10   Temp: 98.5 °F (36.9 °C)      SpO2: 96% 96% 100% 94%   Weight: 255 lb (115.7 kg)      Height: 6' (1.829 m)          Medications Prior to Admission     Prior to Admission medications    Medication Sig Start Date End Date Taking? Authorizing Provider   oxyCODONE-acetaminophen (PERCOCET) 5-325 MG per tablet Take 1 tablet by mouth 2 times daily as needed for Pain. Historical Provider, MD   diclofenac sodium (VOLTAREN) 1 % GEL APPLY 4 G TOPICALLY 2 TIMES DAILY 7/11/22   Miryam Simpson MD   amLODIPine (NORVASC) 10 MG tablet TAKE 1 TABLET BY MOUTH EVERY DAY 4/13/22   Miryam Simpson MD   lisinopril (PRINIVIL;ZESTRIL) 5 MG tablet Take 1 tablet by mouth daily 2/3/22 7/21/22  Mundo Claros MD   metOLazone (ZAROXOLYN) 2.5 MG tablet Take 1 tablet by mouth daily 2/3/22 7/21/22  Mundo Claros MD   torsemide (DEMADEX) 20 MG tablet Take 2 tablets by mouth daily 2/3/22 7/21/22  Mundo Claros MD   gabapentin (NEURONTIN) 300 MG capsule TAKE 1 CAPSULE BY MOUTH 3 TIMES DAILY FOR 90 DAYS.  11/15/21 7/21/22  Antoni Sahni PA-C   metaxalone (SKELAXIN) 800 MG tablet Take 800 mg by mouth 3 times daily    Historical Provider, MD Zackary Figueroa 18 MCG inhalation capsule  6/18/21   Historical Provider, MD   insulin lispro, 1 Unit Dial, 100 UNIT/ML SOPN Inject into the skin 2 times daily Sliding scale dose    Historical Provider, MD   atorvastatin (LIPITOR) 40 MG tablet Take 1 tablet by mouth nightly 12/13/20   Jaylin Stevens MD   carvedilol (COREG) 3.125 MG tablet Take 1 tablet by mouth 2 times daily 12/13/20   Jaylin Stevens MD   albuterol sulfate HFA (VENTOLIN HFA) 108 (90 Base) MCG/ACT inhaler Inhale 2 puffs into the lungs every 4 hours as needed for Wheezing 8/14/20   Fatmata Dutton MD   Calcium Alginate (ALGICELL CALCIUM DRESSING 4\"X8) MISC Apply 1 Device topically daily 3/20/20   Nancy Doyle MD   [facility] COREY WOUND DRESSING MATRIX Apply topically Apply to left daily and cover with gauze 3/19/20   Kimo Stone MD   clopidogrel (PLAVIX) 75 MG tablet Take 1 tablet by mouth daily 7/18/17   Kita Salamanca MD       Physical Exam: Need 8 Elements   Physical Exam     General: NAD  Eyes: EOMI  ENT: neck supple  Cardiovascular: Regular rate. Respiratory: Clear to auscultation  Gastrointestinal: Soft, non tender  Genitourinary: no suprapubic tenderness  Musculoskeletal: edema in BLE, pitting but patient also with severe dryness and skin changes consistent with venous stasis in BL feet, in addition to amputations on both sides wrapped, no bleeding. Skin: warm, dry  Neuro: Alert. Oriented *3, non focal, normal speech   Psych: Mood appropriate. Past Medical History:   PMHx   Past Medical History:   Diagnosis Date    Acid reflux     Acute MI ([address] 2004, 2008    Arthritis     Back    Broken teeth     Upper Front    CAD (coronary artery disease)     Sees Dr. German Urb[facility])     per old chart    CHF (congestive heart failure) ([address]     Chronic back pain     Chronic kidney disease     STAGE 3 KIDNEY FAILURE- \"from my diabetes- do not follow with any one- have seen Dr [facility] in the past\"    Diabetes mellitus ([facility] [address] Dx 1965    per old chart pt has been diabetic since age 13    Diabetic neuropathy ([address]     \"in my feet\"    H/O cardiovascular stress test 08/25/2016    H/O Doppler ultrasound 09/27/2018    Moderate disease of the right lower extremity with an JALEN of 0.72.   Moderate to severe disease of the left lower extremity with an JALEN of 0.55.   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    Neuropathy     both feet    neuropathy     PAD (peripheral artery disease) ([address] 2018    PVD [facility]peripheral vascular disease) [facility])     Sick sinus syndrome (HCC)     Sleep apnea     \"sleep study 3 yrs ago- could not tolerate the cpap made me too dry\"    Spinal stenosis     Teeth missing     Upper And Lower    Type 2 diabetes mellitus without complication ([facility]     WD-Chronic foot ulcer, left, with necrosis of bone ([facility] 2021     PSHX:  has a past surgical history that includes Coronary angioplasty with stent; Dental surgery; Colonoscopy (2016); pacemaker placement (2010); vascular surgery; colectomy (Right, 2016); Toe amputation (Right, 2017); Toe amputation (Right, 2018); Cardiac catheterization; Cardiac defibrillator placement (2010); Coronary angioplasty; Cardiac catheterization (2017); Cardiac catheterization (2018); Toe amputation (Left, 2020); IR TUNNELED CVC PLACE WO SQ PORT/PUMP > 5 YEARS (2021); and Toe amputation (Left, 2022). Allergies: Allergies   Allergen Reactions    Pcn [Penicillins] Hives    Fentanyl Itching     Fam HX:  family history includes Cancer in his brother, father, and son; Diabetes in his brother, mother, and sister; Early Death (age of onset: 62) in his sister; Heart Disease in his brother and sister; High Blood Pressure in his brother, brother, and mother; Neuropathy in his sister; Other in his sister; Stroke in his mother; Vision Loss in his mother. Soc HX:   Social History     Socioeconomic History    Marital status:     Tobacco Use    Smoking status: Former     Packs/day: 0.25     Years: 36.00     Pack years: 9.00     Types: Cigars, Cigarettes     Start date: 1980     Quit date: 10/22/2021     Years since quittin.9    Smokeless tobacco: Never    Tobacco comments:     Client states he has stopped smoking   Vaping Use    Vaping Use: Never used Substance and Sexual Activity    Alcohol use: No    Drug use: Yes     Types: Marijuana Arleth Forte)    Sexual activity: Never       Medications:   Medications:    amLODIPine  1 tablet Oral Daily    atorvastatin  40 mg Oral Nightly    Algicell Calcium Dressing 4\"x8  1 Device Apply externally Daily    carvedilol  3.125 mg Oral BID    clopidogrel  75 mg Oral Daily    gabapentin  300 mg Oral TID    metaxalone  800 mg Oral TID    tiotropium  2 puff Inhalation Daily    sodium chloride flush  5-40 mL IntraVENous 2 times per day    enoxaparin  30 mg SubCUTAneous BID      Infusions:    sodium chloride       PRN Meds: albuterol sulfate HFA, 2 puff, Q4H PRN  oxyCODONE-acetaminophen, 1 tablet, BID PRN  sodium chloride flush, 5-40 mL, PRN  sodium chloride, , PRN  acetaminophen, 650 mg, Q4H PRN  ondansetron, 4 mg, Q8H PRN   Or  ondansetron, 4 mg, Q6H PRN        Labs      CBC:   Recent Labs     10/11/22  2333   WBC 5.5   HGB 9.3*     BMP:    Recent Labs     10/11/22  2333      K 5.3*      CO2 20*   BUN 33*   CREATININE 2.2*   GLUCOSE 86     Hepatic:   Recent Labs     10/11/22  2333   AST 35   ALT 11   BILITOT 0.9   ALKPHOS 70     Lipids:   Lab Results   Component Value Date/Time    CHOL 79 12/11/2020 07:00 AM    HDL 30 12/11/2020 07:00 AM    TRIG 61 12/11/2020 07:00 AM     Hemoglobin A1C:   Lab Results   Component Value Date/Time    LABA1C 7.7 07/25/2022 08:08 PM     TSH: No results found for: TSH  Troponin:   Lab Results   Component Value Date/Time    TROPONINT 0.056 10/11/2022 11:33 PM    TROPONINT 0.048 01/23/2022 06:57 AM    TROPONINT 0.034 01/22/2022 11:35 AM     Lactic Acid: No results for input(s): LACTA in the last 72 hours.   BNP:   Recent Labs     10/11/22  2333   PROBNP 8,657*     UA:  Lab Results   Component Value Date/Time    NITRU NEGATIVE 01/23/2022 02:25 PM    COLORU YELLOW 01/23/2022 02:25 PM    PHUR 5.0 08/19/2016 09:38 AM    WBCUA 34 01/23/2022 02:25 PM    RBCUA 19 01/23/2022 02:25 PM MUCUS RARE 01/23/2022 02:25 PM    TRICHOMONAS NONE SEEN 10/29/2021 12:15 PM    BACTERIA NEGATIVE 01/23/2022 02:25 PM    CLARITYU CLEAR 01/23/2022 02:25 PM    SPECGRAV 1.015 01/23/2022 02:25 PM    LEUKOCYTESUR MODERATE 01/23/2022 02:25 PM    UROBILINOGEN 1.0 01/23/2022 02:25 PM    BILIRUBINUR NEGATIVE 01/23/2022 02:25 PM    BLOODU MODERATE 01/23/2022 02:25 PM    Ambrosio [place] NEGATIVE 01/23/2022 02:25 PM     Urine Cultures: No results found for: Ren Schrader  Blood Cultures: No results found for: BC  No results found for: BLOODCULT2  Organism: No results found for: ORG    Imaging/Diagnostics Last 24 Hours   XR CHEST PORTABLE    Result Date: 10/11/2022  EXAMINATION: ONE XRAY VIEW OF THE CHEST 10/11/2022 11:22 pm COMPARISON: June 22, 2022 HISTORY: ORDERING SYSTEM PROVIDED HISTORY: chest pain TECHNOLOGIST PROVIDED HISTORY: Reason for exam:->chest pain Reason for Exam: chest pain FINDINGS: There is mild pulmonary vascular congestion. No effusion is identified. The heart size is normal.  A pulse generator is present over the left anterior chest wall with 2 leads projecting over the heart. Mild pulmonary vascular congestion. Personally reviewed Lab Studies, Imaging, and EKG.      Electronically signed by Farideh Meneses MD on 10/12/2022 at 1:43 AM

## 2022-10-13 ENCOUNTER — APPOINTMENT (OUTPATIENT)
Dept: ULTRASOUND IMAGING | Age: 72
DRG: 252 | End: 2022-10-13
Payer: MEDICARE

## 2022-10-13 LAB
ALBUMIN SERPL-MCNC: 2.9 GM/DL (ref 3.4–5)
ALP BLD-CCNC: 78 IU/L (ref 40–128)
ALT SERPL-CCNC: 7 U/L (ref 10–40)
ANION GAP SERPL CALCULATED.3IONS-SCNC: 8 MMOL/L (ref 4–16)
ANION GAP SERPL CALCULATED.3IONS-SCNC: 8 MMOL/L (ref 4–16)
AST SERPL-CCNC: 13 IU/L (ref 15–37)
BACTERIA: NEGATIVE /HPF
BASOPHILS ABSOLUTE: 0 K/CU MM
BASOPHILS RELATIVE PERCENT: 0.5 % (ref 0–1)
BILIRUB SERPL-MCNC: 0.9 MG/DL (ref 0–1)
BILIRUBIN URINE: NEGATIVE
BLOOD, URINE: NEGATIVE
BUN BLDV-MCNC: 34 MG/DL (ref 6–23)
BUN BLDV-MCNC: 35 MG/DL (ref 6–23)
CALCIUM SERPL-MCNC: 8.1 MG/DL (ref 8.3–10.6)
CALCIUM SERPL-MCNC: 8.2 MG/DL (ref 8.3–10.6)
CHLORIDE BLD-SCNC: 106 MMOL/L (ref 99–110)
CHLORIDE BLD-SCNC: 107 MMOL/L (ref 99–110)
CLARITY: CLEAR
CO2: 26 MMOL/L (ref 21–32)
CO2: 27 MMOL/L (ref 21–32)
COLOR: YELLOW
CREAT SERPL-MCNC: 2.2 MG/DL (ref 0.9–1.3)
CREAT SERPL-MCNC: 2.2 MG/DL (ref 0.9–1.3)
CREATININE URINE: 35.3 MG/DL (ref 39–259)
DIFFERENTIAL TYPE: ABNORMAL
EOSINOPHILS ABSOLUTE: 0.1 K/CU MM
EOSINOPHILS RELATIVE PERCENT: 2.2 % (ref 0–3)
GFR AFRICAN AMERICAN: 36 ML/MIN/1.73M2
GFR AFRICAN AMERICAN: 36 ML/MIN/1.73M2
GFR NON-AFRICAN AMERICAN: 30 ML/MIN/1.73M2
GFR NON-AFRICAN AMERICAN: 30 ML/MIN/1.73M2
GLUCOSE BLD-MCNC: 132 MG/DL (ref 70–99)
GLUCOSE BLD-MCNC: 132 MG/DL (ref 70–99)
GLUCOSE BLD-MCNC: 153 MG/DL (ref 70–99)
GLUCOSE BLD-MCNC: 174 MG/DL (ref 70–99)
GLUCOSE BLD-MCNC: 182 MG/DL (ref 70–99)
GLUCOSE BLD-MCNC: 191 MG/DL (ref 70–99)
GLUCOSE, URINE: NEGATIVE MG/DL
HCT VFR BLD CALC: 30.8 % (ref 42–52)
HEMOGLOBIN: 9.1 GM/DL (ref 13.5–18)
IMMATURE NEUTROPHIL %: 0.2 % (ref 0–0.43)
KETONES, URINE: NEGATIVE MG/DL
LEUKOCYTE ESTERASE, URINE: NORMAL
LYMPHOCYTES ABSOLUTE: 1.2 K/CU MM
LYMPHOCYTES RELATIVE PERCENT: 20.9 % (ref 24–44)
MCH RBC QN AUTO: 26.8 PG (ref 27–31)
MCHC RBC AUTO-ENTMCNC: 29.5 % (ref 32–36)
MCV RBC AUTO: 90.9 FL (ref 78–100)
MONOCYTES ABSOLUTE: 0.4 K/CU MM
MONOCYTES RELATIVE PERCENT: 6.9 % (ref 0–4)
NITRITE URINE, QUANTITATIVE: NEGATIVE
NUCLEATED RBC %: 0 %
PDW BLD-RTO: 16.4 % (ref 11.7–14.9)
PH, URINE: 5
PLATELET # BLD: 213 K/CU MM (ref 140–440)
PMV BLD AUTO: 9.8 FL (ref 7.5–11.1)
POTASSIUM SERPL-SCNC: 4.6 MMOL/L (ref 3.5–5.1)
POTASSIUM SERPL-SCNC: 4.6 MMOL/L (ref 3.5–5.1)
PROT/CREAT RATIO, UR: 0.4
PROTEIN UA: NEGATIVE MG/DL
RBC # BLD: 3.39 M/CU MM (ref 4.6–6.2)
RBC URINE: NORMAL /HPF
SEGMENTED NEUTROPHILS ABSOLUTE COUNT: 3.8 K/CU MM
SEGMENTED NEUTROPHILS RELATIVE PERCENT: 69.3 % (ref 36–66)
SODIUM BLD-SCNC: 141 MMOL/L (ref 135–145)
SODIUM BLD-SCNC: 141 MMOL/L (ref 135–145)
SODIUM URINE: 114 MMOL/L (ref 35–167)
SPECIFIC GRAVITY UA: 1.01
TOTAL IMMATURE NEUTOROPHIL: 0.01 K/CU MM
TOTAL NUCLEATED RBC: 0 K/CU MM
TOTAL PROTEIN: 6 GM/DL (ref 6.4–8.2)
TRICHOMONAS: NORMAL /HPF
URINE TOTAL PROTEIN: 15.4 MG/DL
UROBILINOGEN, URINE: 0.2 MG/DL
WBC # BLD: 5.5 K/CU MM (ref 4–10.5)
WBC UA: <1 /HPF

## 2022-10-13 PROCEDURE — 80053 COMPREHEN METABOLIC PANEL: CPT

## 2022-10-13 PROCEDURE — 6360000002 HC RX W HCPCS: Performed by: INTERNAL MEDICINE

## 2022-10-13 PROCEDURE — 84156 ASSAY OF PROTEIN URINE: CPT

## 2022-10-13 PROCEDURE — 1200000000 HC SEMI PRIVATE

## 2022-10-13 PROCEDURE — 85025 COMPLETE CBC W/AUTO DIFF WBC: CPT

## 2022-10-13 PROCEDURE — 2580000003 HC RX 258: Performed by: STUDENT IN AN ORGANIZED HEALTH CARE EDUCATION/TRAINING PROGRAM

## 2022-10-13 PROCEDURE — 6360000002 HC RX W HCPCS: Performed by: STUDENT IN AN ORGANIZED HEALTH CARE EDUCATION/TRAINING PROGRAM

## 2022-10-13 PROCEDURE — 2700000000 HC OXYGEN THERAPY PER DAY

## 2022-10-13 PROCEDURE — 6370000000 HC RX 637 (ALT 250 FOR IP): Performed by: STUDENT IN AN ORGANIZED HEALTH CARE EDUCATION/TRAINING PROGRAM

## 2022-10-13 PROCEDURE — 81001 URINALYSIS AUTO W/SCOPE: CPT

## 2022-10-13 PROCEDURE — 96375 TX/PRO/DX INJ NEW DRUG ADDON: CPT

## 2022-10-13 PROCEDURE — 96372 THER/PROPH/DIAG INJ SC/IM: CPT

## 2022-10-13 PROCEDURE — G0378 HOSPITAL OBSERVATION PER HR: HCPCS

## 2022-10-13 PROCEDURE — 82962 GLUCOSE BLOOD TEST: CPT

## 2022-10-13 PROCEDURE — 96376 TX/PRO/DX INJ SAME DRUG ADON: CPT

## 2022-10-13 PROCEDURE — 80048 BASIC METABOLIC PNL TOTAL CA: CPT

## 2022-10-13 PROCEDURE — 2580000003 HC RX 258: Performed by: INTERNAL MEDICINE

## 2022-10-13 PROCEDURE — 99223 1ST HOSP IP/OBS HIGH 75: CPT | Performed by: INTERNAL MEDICINE

## 2022-10-13 PROCEDURE — 36415 COLL VENOUS BLD VENIPUNCTURE: CPT

## 2022-10-13 PROCEDURE — 94640 AIRWAY INHALATION TREATMENT: CPT

## 2022-10-13 PROCEDURE — 84300 ASSAY OF URINE SODIUM: CPT

## 2022-10-13 PROCEDURE — 93925 LOWER EXTREMITY STUDY: CPT

## 2022-10-13 PROCEDURE — 82570 ASSAY OF URINE CREATININE: CPT

## 2022-10-13 PROCEDURE — 94761 N-INVAS EAR/PLS OXIMETRY MLT: CPT

## 2022-10-13 RX ORDER — SODIUM CHLORIDE 9 MG/ML
INJECTION, SOLUTION INTRAVENOUS PRN
Status: DISCONTINUED | OUTPATIENT
Start: 2022-10-13 | End: 2022-10-24 | Stop reason: HOSPADM

## 2022-10-13 RX ORDER — SODIUM CHLORIDE 0.9 % (FLUSH) 0.9 %
5-40 SYRINGE (ML) INJECTION PRN
Status: DISCONTINUED | OUTPATIENT
Start: 2022-10-13 | End: 2022-10-24 | Stop reason: HOSPADM

## 2022-10-13 RX ORDER — MILRINONE LACTATE 0.2 MG/ML
0.38 INJECTION, SOLUTION INTRAVENOUS CONTINUOUS
Status: DISCONTINUED | OUTPATIENT
Start: 2022-10-13 | End: 2022-10-17

## 2022-10-13 RX ORDER — LIDOCAINE HYDROCHLORIDE 10 MG/ML
5 INJECTION, SOLUTION EPIDURAL; INFILTRATION; INTRACAUDAL; PERINEURAL ONCE
Status: DISCONTINUED | OUTPATIENT
Start: 2022-10-13 | End: 2022-10-24 | Stop reason: HOSPADM

## 2022-10-13 RX ORDER — FUROSEMIDE 10 MG/ML
60 INJECTION INTRAMUSCULAR; INTRAVENOUS 2 TIMES DAILY
Status: DISCONTINUED | OUTPATIENT
Start: 2022-10-13 | End: 2022-10-15

## 2022-10-13 RX ORDER — SODIUM CHLORIDE 0.9 % (FLUSH) 0.9 %
5-40 SYRINGE (ML) INJECTION EVERY 12 HOURS SCHEDULED
Status: DISCONTINUED | OUTPATIENT
Start: 2022-10-13 | End: 2022-10-24 | Stop reason: HOSPADM

## 2022-10-13 RX ADMIN — CARVEDILOL 3.12 MG: 6.25 TABLET, FILM COATED ORAL at 09:21

## 2022-10-13 RX ADMIN — SODIUM CHLORIDE, PRESERVATIVE FREE 10 ML: 5 INJECTION INTRAVENOUS at 21:05

## 2022-10-13 RX ADMIN — OXYCODONE AND ACETAMINOPHEN 1 TABLET: 5; 325 TABLET ORAL at 04:29

## 2022-10-13 RX ADMIN — GABAPENTIN 300 MG: 300 CAPSULE ORAL at 13:35

## 2022-10-13 RX ADMIN — GABAPENTIN 300 MG: 300 CAPSULE ORAL at 21:04

## 2022-10-13 RX ADMIN — COLLAGENASE SANTYL: 250 OINTMENT TOPICAL at 09:22

## 2022-10-13 RX ADMIN — FUROSEMIDE 60 MG: 10 INJECTION, SOLUTION INTRAVENOUS at 12:28

## 2022-10-13 RX ADMIN — ATORVASTATIN CALCIUM 40 MG: 40 TABLET, FILM COATED ORAL at 21:04

## 2022-10-13 RX ADMIN — MILRINONE LACTATE 0.38 MCG/KG/MIN: 200 INJECTION, SOLUTION INTRAVENOUS at 12:29

## 2022-10-13 RX ADMIN — CLOPIDOGREL BISULFATE 75 MG: 75 TABLET ORAL at 09:21

## 2022-10-13 RX ADMIN — ENOXAPARIN SODIUM 30 MG: 100 INJECTION SUBCUTANEOUS at 09:22

## 2022-10-13 RX ADMIN — ENOXAPARIN SODIUM 30 MG: 100 INJECTION SUBCUTANEOUS at 21:04

## 2022-10-13 RX ADMIN — FUROSEMIDE 60 MG: 10 INJECTION, SOLUTION INTRAVENOUS at 21:03

## 2022-10-13 RX ADMIN — OXYCODONE AND ACETAMINOPHEN 1 TABLET: 5; 325 TABLET ORAL at 21:04

## 2022-10-13 RX ADMIN — SODIUM CHLORIDE, PRESERVATIVE FREE 10 ML: 5 INJECTION INTRAVENOUS at 10:37

## 2022-10-13 RX ADMIN — FUROSEMIDE 20 MG: 10 INJECTION, SOLUTION INTRAVENOUS at 10:06

## 2022-10-13 RX ADMIN — GABAPENTIN 300 MG: 300 CAPSULE ORAL at 09:21

## 2022-10-13 RX ADMIN — TIOTROPIUM BROMIDE INHALATION SPRAY 2 PUFF: 3.12 SPRAY, METERED RESPIRATORY (INHALATION) at 08:47

## 2022-10-13 RX ADMIN — CARVEDILOL 3.12 MG: 6.25 TABLET, FILM COATED ORAL at 21:04

## 2022-10-13 RX ADMIN — SODIUM CHLORIDE, PRESERVATIVE FREE 10 ML: 5 INJECTION INTRAVENOUS at 09:22

## 2022-10-13 RX ADMIN — AMLODIPINE BESYLATE 10 MG: 10 TABLET ORAL at 09:21

## 2022-10-13 ASSESSMENT — PAIN SCALES - GENERAL
PAINLEVEL_OUTOF10: 0
PAINLEVEL_OUTOF10: 7
PAINLEVEL_OUTOF10: 8
PAINLEVEL_OUTOF10: 7

## 2022-10-13 ASSESSMENT — PAIN DESCRIPTION - ORIENTATION: ORIENTATION: LEFT;RIGHT

## 2022-10-13 ASSESSMENT — PAIN DESCRIPTION - LOCATION
LOCATION: LEG
LOCATION: LEG

## 2022-10-13 ASSESSMENT — PAIN DESCRIPTION - DESCRIPTORS: DESCRIPTORS: ACHING

## 2022-10-13 NOTE — PLAN OF CARE
Problem: Discharge Planning  Goal: Discharge to home or other facility with appropriate resources  10/13/2022 0919 by Laura Childers LPN  Outcome: Progressing  10/13/2022 0525 by Nilsa Solitario RN  Outcome: Progressing  Flowsheets (Taken 10/12/2022 1849 by Amara Layne RN)  Discharge to home or other facility with appropriate resources:   Identify barriers to discharge with patient and caregiver   Arrange for needed discharge resources and transportation as appropriate   Arrange for interpreters to assist at discharge as needed   Refer to discharge planning if patient needs post-hospital services based on physician order or complex needs related to functional status, cognitive ability or social support system   Identify discharge learning needs (meds, wound care, etc)     Problem: Pain  Goal: Verbalizes/displays adequate comfort level or baseline comfort level  10/13/2022 0919 by Laura Childers LPN  Outcome: Progressing  10/13/2022 0525 by Nisla Solitario RN  Outcome: Progressing     Problem: Skin/Tissue Integrity  Goal: Absence of new skin breakdown  Description: 1. Monitor for areas of redness and/or skin breakdown  2. Assess vascular access sites hourly  3. Every 4-6 hours minimum:  Change oxygen saturation probe site  4. Every 4-6 hours:  If on nasal continuous positive airway pressure, respiratory therapy assess nares and determine need for appliance change or resting period.   10/13/2022 0919 by Laura Childers LPN  Outcome: Progressing  10/13/2022 0525 by Nilsa Solitario RN  Outcome: Progressing     Problem: Safety - Adult  Goal: Free from fall injury  10/13/2022 0919 by Laura Childers LPN  Outcome: Progressing  10/13/2022 0525 by Nilsa Solitario RN  Outcome: Progressing     Problem: Chronic Conditions and Co-morbidities  Goal: Patient's chronic conditions and co-morbidity symptoms are monitored and maintained or improved  10/13/2022 0919 by Laura Childers LPN  Outcome: Progressing  10/13/2022 0525 by Elois Arrant, RN  Outcome: Progressing

## 2022-10-13 NOTE — PROGRESS NOTES
V2.0  [facility] Hospitalist Progress Note      Name:  Joseph Eller /Age/Sex: 1950  (67 y.o. male)   MRN & CSN:  [id number] & [id number] Encounter Date/Time: 10/13/2022 9:59 AM EDT    Location:  8246/2187-X PCP: Michelle Rowe Day: 3    Assessment and Plan:   Joseph Eller is a 67 y.o. male with pmh of CAD, CHF, stage III, T2DM who presents with shortness of breath, scrotal swelling, subsequently admitted for Heart failure exacerbated by sotalol ([facility] [address]      Plan:  Volume overload in the setting of heart failure exacerbation likely exacerbated by sotalol.  - Bilateral lower extremity swelling  - Scrotal/penile swelling, progressive becoming worse, patient is on torsemide 20 mg twice daily.  - BNP 8600, patient received 40 mg IV Lasix in ED, will continue IV diuretic at a lower dose at this time. - 2D echo completed, echo report shows grade 2 diastolic dysfunction, with normal LV function with an EF of 50-55%  --Remote interrogation of ICD report shows that it is consistent with normal dual-chamber MRI safe [employer] function with stable leads and appropriate battery status related to the device. --Cardiology consulted; appreciate recs.    - Monitor telemetry  - PT/OT evaluation, appreciate recommendations  - Lab work in a.m.  - Patient wears 3 L/NC of O2 at home    Acute on chronic Kidney disease   --Creatinine 2.2  --Nephrology consult, appreciate recs    Multiple wounds to right/left foot/toes/scrotum  - General surgery consult  - Wound care following, cultures ordered/sent  - Dressing change per wound care recommendations    Chronic hypoxic respiratory failure  - 3 L/NC home O2, patient at baseline    Hypertension  --Continue on home meds, lisinopril on hold at this time    CAD  AICD  - Continue Plavix, statin, and beta-blocker  - CXR shows vascular congestion    Mixed hyperlipidemia  - Continue statin    Chronically elevated troponin  - Troponin on admission 0.056  - Denies chest pain    COPD  - Does not appear to be in exacerbation  - Continue home medication regimen    T2DM  --Hemoglobin A1c 7.1      Diet ADULT DIET; Regular; Low Sodium (2 gm)   DVT Prophylaxis [x] Lovenox, []  Heparin, [] SCDs, [] Ambulation,  [] Eliquis, [] Xarelto  [] Coumadin   Code Status Full Code   Disposition From: Home  Expected Disposition: Home  Estimated Date of Discharge: 2-3 days  Patient requires continued admission due to general surgery consult, wound care, volume overload   Surrogate Decision Maker/ POA Self     Subjective:     Chief Complaint: Leg Swelling (B/L)  Patient seen this a.m. at bedside, wound care team at bedside. Review of Systems:    Review of Systems   Cardiovascular:  Positive for leg swelling. Genitourinary:  Positive for scrotal swelling. Musculoskeletal:  Positive for joint swelling. Skin:  Positive for wound. Right great toe; right lateral plantar open wound  Left great and second toe open wound  Scrotum wound  Right ischium wound   All other systems reviewed and are negative. Objective: Intake/Output Summary (Last 24 hours) at 10/13/2022 1201  Last data filed at 10/13/2022 9355  Gross per 24 hour   Intake --   Output 600 ml   Net -600 ml        Vitals:   Vitals:    10/13/22 0900   BP: 130/61   Pulse: 62   Resp: 13   Temp: 97.7 °F (36.5 °C)   SpO2: 93%       Physical Exam:     General: NAD, cooperative, interactive with care  Eyes: EOMI,   ENT: neck supple  Cardiovascular: Regular rate, ICD  Respiratory: Diminished, diffused crackles   Gastrointestinal: Soft, non tender  Genitourinary: no suprapubic tenderness  Musculoskeletal: No edema  Skin: warm, dry, open wounds to Right great toe; right lateral plantar open wound; Left great and second toe;open wound  Scrotum wound' Right ischium wound   Neuro: Alert. oriented x 3  Psych: Mood appropriate.             Medications:   Medications:    furosemide  60 mg IntraVENous BID    lidocaine PF  5 mL IntraDERmal Once  sodium chloride flush  5-40 mL IntraVENous 2 times per day    amLODIPine  10 mg Oral Daily    atorvastatin  40 mg Oral Nightly    carvedilol  3.125 mg Oral BID    clopidogrel  75 mg Oral Daily    gabapentin  300 mg Oral TID    metaxalone  800 mg Oral TID    tiotropium  2 puff Inhalation Daily    sodium chloride flush  5-40 mL IntraVENous 2 times per day    enoxaparin  30 mg SubCUTAneous BID    insulin lispro  0-4 Units SubCUTAneous TID WC    insulin lispro  0-4 Units SubCUTAneous Nightly    collagenase   Topical Daily      Infusions:    milrinone      sodium chloride      sodium chloride      dextrose       PRN Meds: sodium chloride flush, 5-40 mL, PRN  sodium chloride, , PRN  albuterol sulfate HFA, 2 puff, Q4H PRN  oxyCODONE-acetaminophen, 1 tablet, BID PRN  sodium chloride flush, 5-40 mL, PRN  sodium chloride, , PRN  acetaminophen, 650 mg, Q4H PRN  ondansetron, 4 mg, Q8H PRN   Or  ondansetron, 4 mg, Q6H PRN  glucose, 4 tablet, PRN  dextrose bolus, 125 mL, PRN   Or  dextrose bolus, 250 mL, PRN  glucagon (rDNA), 1 mg, PRN  dextrose, , Continuous PRN      Labs      Recent Results (from the past 24 hour(s))   POCT Glucose    Collection Time: 10/12/22 12:09 PM   Result Value Ref Range    POC Glucose 110 (H) 70 - 99 MG/DL   Troponin    Collection Time: 10/12/22  1:21 PM   Result Value Ref Range    Troponin T 0.074 (H) <0.01 NG/ML   POCT Glucose    Collection Time: 10/12/22  4:25 PM   Result Value Ref Range    POC Glucose 134 (H) 70 - 99 MG/DL   POCT Glucose    Collection Time: 10/12/22  8:51 PM   Result Value Ref Range    POC Glucose 143 (H) 70 - 99 MG/DL   POCT Glucose    Collection Time: 10/13/22  8:10 AM   Result Value Ref Range    POC Glucose 153 (H) 70 - 99 MG/DL   Basic Metabolic Panel w/ Reflex to MG    Collection Time: 10/13/22  9:17 AM   Result Value Ref Range    Sodium 141 135 - 145 MMOL/L    Potassium 4.6 3.5 - 5.1 MMOL/L    Chloride 107 99 - 110 mMol/L    CO2 26 21 - 32 MMOL/L    Anion Gap 8 4 - 16    BUN 34 (H) 6 - 23 MG/DL    Creatinine 2.2 (H) 0.9 - 1.3 MG/DL    Glucose 132 (H) 70 - 99 MG/DL    Calcium 8.1 (L) 8.3 - 10.6 MG/DL    GFR Non-African American 30 (L) >60 mL/min/1.73m2    GFR  36 (L) >60 mL/min/1.73m2   CBC with Auto Differential    Collection Time: 10/13/22  9:17 AM   Result Value Ref Range    WBC 5.5 4.0 - 10.5 K/CU MM    RBC 3.39 (L) 4.6 - 6.2 M/CU MM    Hemoglobin 9.1 (L) 13.5 - 18.0 GM/DL    Hematocrit 30.8 (L) 42 - 52 %    MCV 90.9 78 - 100 FL    MCH 26.8 (L) 27 - 31 PG    MCHC 29.5 (L) 32.0 - 36.0 %    RDW 16.4 (H) 11.7 - 14.9 %    Platelets 511 106 - 624 K/CU MM    MPV 9.8 7.5 - 11.1 FL    Differential Type AUTOMATED DIFFERENTIAL     Segs Relative 69.3 (H) 36 - 66 %    Lymphocytes % 20.9 (L) 24 - 44 %    Monocytes % 6.9 (H) 0 - 4 %    Eosinophils % 2.2 0 - 3 %    Basophils % 0.5 0 - 1 %    Segs Absolute 3.8 K/CU MM    Lymphocytes Absolute 1.2 K/CU MM    Monocytes Absolute 0.4 K/CU MM    Eosinophils Absolute 0.1 K/CU MM    Basophils Absolute 0.0 K/CU MM    Nucleated RBC % 0.0 %    Total Nucleated RBC 0.0 K/CU MM    Total Immature Neutrophil 0.01 K/CU MM    Immature Neutrophil % 0.2 0 - 0.43 %   Comprehensive Metabolic Panel w/ Reflex to MG    Collection Time: 10/13/22  9:17 AM   Result Value Ref Range    Sodium 141 135 - 145 MMOL/L    Potassium 4.6 3.5 - 5.1 MMOL/L    Chloride 106 99 - 110 mMol/L    CO2 27 21 - 32 MMOL/L    BUN 35 (H) 6 - 23 MG/DL    Creatinine 2.2 (H) 0.9 - 1.3 MG/DL    Glucose 132 (H) 70 - 99 MG/DL    Calcium 8.2 (L) 8.3 - 10.6 MG/DL    Albumin 2.9 (L) 3.4 - 5.0 GM/DL    Total Protein 6.0 (L) 6.4 - 8.2 GM/DL    Total Bilirubin 0.9 0.0 - 1.0 MG/DL    ALT 7 (L) 10 - 40 U/L    AST 13 (L) 15 - 37 IU/L    Alkaline Phosphatase 78 40 - 128 IU/L    GFR Non-African American 30 (L) >60 mL/min/1.73m2    GFR  36 (L) >60 mL/min/1.73m2    Anion Gap 8 4 - 16        Imaging/Diagnostics Last 24 Hours   XR CHEST PORTABLE    Result Date: 10/11/2022  EXAMINATION: ONE XRAY VIEW OF THE CHEST 10/11/2022 11:22 pm COMPARISON: June 22, 2022 HISTORY: ORDERING SYSTEM PROVIDED HISTORY: chest pain TECHNOLOGIST PROVIDED HISTORY: Reason for exam:->chest pain Reason for Exam: chest pain FINDINGS: There is mild pulmonary vascular congestion. No effusion is identified. The heart size is normal.  A pulse generator is present over the left anterior chest wall with 2 leads projecting over the heart. Mild pulmonary vascular congestion.        Electronically signed by MICKY Andrew CNP on 10/13/2022 at 12:01 PM

## 2022-10-13 NOTE — CONSULTS
Patient:   José Luis Hoffmann    Date:  10/13/22  :  1950, 67 y.o. Nephrologist: Melonie Rubio MD  Provider: Howie Valerio    Reason for Consult: CKD stage IV with fluid overload    Consult requested by : Dr Teena Mason MD    Chief Complaint:   Weight gain and leg and thigh edema    HISTORY OF PRESENT ILLNESS/Brief hospital course  AND  brief background history    Mr. Christine Ratliff, is a 55-year-old male, was brought to the emergency department via squad yesterday with above-mentioned symptoms. According to Mr. Basilia Odonnell he started having more and more edema, despite of taking his home medication, he was very uncomfortable, had difficulty urinating and finally decided to call the squad. .    On arrival in the emergency department, he was afebrile, hemodynamic stable, his O2 saturation by pulse oximetry was 96% while breathing on 3 L of supplemental oxygen. He also underwent several diagnostic test, which include imaging and biochemical.  Imaging study mainly chest x-ray showed pulmonary edema and cardiomegaly. Biochemical testing showed BUN/creatinine 33 and 2.2, which is close to his baseline but slightly high potassium of 5.3. Other pertinent abnormal lab include low albumin of 2.6, high proBNP level more than 8600, low hemoglobin of 9.3. He was given 40 mg IV loop followed by 60 mg twice even subsequently admitted to medical floor for further evaluation and management. Since admission his potassium did go down to 4.6, creatinine still 2.2 I do not see any urinary indicis        I am of course very familiar with him. I been doing him almost last 12 years or so. Briefly speaking he has progressive kidney disease with creatinine close to 2 which, due to underlying cardiomyopathy, cardiorenal syndrome, hypertension and atherosclerotic cardiovascular disease. His biggest problem is fluid retention mainly right heart failure pattern.   Also has significant lower extremity peripheral arterial disease-causing chronic soft tissue, skin and bone infection. He has had several angiographic intervention. My last encounter with her was on February 21, 2022. Right after his inpatient stay. During this visit his blood pressure recorded 150/80, but he had 3+ leg edema, since there is a discrepancy between the epic medication list and when he really takes, the plan was for acute call me and I will add Some of the medication. Unfortunately he never had a chance to see me again since then. Review of [facility] showed he was admitted in July, with leg pain and underwent to amputation and was discharged in August.     PAST MEDICAL HISTORY:  1.  CKD stage IIIA/B A3 with several acute kidney injuries, mainly by  creatinine criteria of course. 2.  Cardiorenal syndrome type 2 frequently transforming to type 1, but  has not been doing it since 12/2020. His diabetes mellitus is  longstanding, it was not very well controlled, had significant  proteinuria. 3.  Hypertension, also was not very well controlled. 4.  Coronary artery disease. He has had several stents long time ago by  Dr. Paco Loving. He also had an ICD placement. His EF was about 45% with most recent left ventricular ejection fraction is 55% on his October 12, 5357, diastolic dysfunction-severe dilated right ventricle with severe tricuspid regurgitation  unless there is any recent echocardiogram done. 5.  Severe atherosclerotic cardiovascular disease involving several  vascular beds, mainly the lower extremity too. He had angiogram  intervention and toe amputation as I mentioned earlier. 6.  Chronic leg edema, which is pretty much minimal now. 7.  Probable autonomic dysfunction. He did have some orthostatic  hypotension before, although it could be multifactorial.     PAST SURGICAL HISTORY:  1. 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 2.  Angiogram several of them. 3.  Pacemaker and ICD placement.   4.  Coronary angiogram too, had intervention in the past.     FAMILY HISTORY:  Coronary artery disease runs in the family. Nobody has  advanced kidney disease or is on dialysis. SOCIAL HISTORY:  The patient is . He was born in [facility],  where he moved to [place] for a while and back here since, I  think, in 2016. He does have some extended family. He lives alone at  home. He does probably have some home healthcare. He does have an  electrical scooter. I am assuming that he has some social support, but  may be I am wrong, I need to double check with him. HABITS:  He does not smoke. No history of alcohol or illicit drug  abuse. REVIEW OF SYSTEMS:     All pertinent ROS neg except   With pain, significant thigh and leg edema  He is with milrinone     Medication :     Reviewed, see in epic    /61   Pulse 62   Temp 97.7 °F (36.5 °C) (Oral)   Resp 13   Ht 6' (1.829 m)   Wt 276 lb 4.8 oz (125.3 kg)   SpO2 93%   BMI 37.47 kg/m²     PHYSICAL EXAM:  General appearance: Alert, awake and oriented  HEENT: At least 2+ conjunctival pallor  Neck: Supple  Heart: Irregular, left chest wall implanted cardiac device  LUNGS: No gross crackles on auscultation  Abdomen: Soft  Extremities:  At least 3+ thigh and leg edema  He has pure wick    LABS:  Reviewed, see in epic            IMPRESSION:  CKD stage G4 A1-so progression also has high risk for progression-with fluid overload now  Acute decompensated heart failure with relatively preserved left ventricular ejection fraction-with underlying severe atherosclerotic cardiovascular disease-also distorted cardiac geometry-with pulmonary hypertension, right ventricular dilatation and tricuspid severe regurgitation-probably causing most of the leg edema  Underlying hypertension, anemia and multiple other chronic condition  High risk for skin/soft tissue/bone infection    PLAN:    Start with UA and urinary indicis  Is responding to diuretics so far-adjust as clinically indicated and according

## 2022-10-13 NOTE — PROGRESS NOTES
Met with patient and introduced myself as the Heart failure education R.N. Patient alert but fatigued. Closing eyes and dozing briefly during visit. Education reviewed briefly and education materials left at bedside. Will attempt to see patient closer to discharge to continue education. Patient reports having services at home, such as [employer] aide. States his daughter does the shopping and cooking and attempts to follow recommendations. States he had a 30 # weight gain prior to coming to the hospital. Stressed importance of reaching out to providers when first noticing weight gain and swelling. Voiced understanding. Admitting diagnosis-Heart failure exacerbateb by Sotalol   Cardiologist- Job Hanna  Ejection fraction 50-55% as of 10/12/22  Pro BNP- 8,657 on 10/11  Hospital follow up appt-  Home health and 10/31 Wilma (previously scheduled)    Patient knows of appointment and appointment added to AVS  Pneumonia vaccine- up to date   PCP-[facility]  Patient has a digital scale? States he has a scale    Able to obtain medications without difficulty? - Yes- Patient denies difficulty in obtaining or taking medications. Reviewed Heart failure patient education book with patient. Questions answered. The following handouts were given to patient.: Heart Failure education booklet, the [employer] handout, the 'Stop Light' Handout, Staying Motivated- Finding Your Why, a link to the American Heart Association's Healthier Living with Heart Failure Interactive workbook and a list of heart failure related education available on the hospital TV and how to access it.     10/17/22 Met with patient a second time. Reviewed heart failure education. Patient not engaged with education. States he is having a procedure this a.m. and just wants to concentrate on it.

## 2022-10-13 NOTE — CONSULTS
CARDIOLOGY CONSULT NOTE   Reason for consultation:  CHF     Referring physician:  Terri Enrique MD     Primary care physician: Vito Kendall      Dear  Dr. Terri Enrique MD   Thanks for the consult. Chief Complaints :  Chief Complaint   Patient presents with    Leg Swelling     B/L        History of present illness:Anmol is a 67 y. o.year old who presents with about lower extremity swelling he has been compliant with medication he is noted increased scrotal swelling and open ulcers of the leg due to constant swelling when he came to the emergency department his BNP was quite elevated he says has been using torsemide but is not sure if it is working? [facility] has had several admissions for symptoms like this is known to cardiology service very well  Known history of congestive heart failure due to ischemic cardiomyopathy with ejection fraction 40 to 45%  S/p ICD multiple stents in the past in addition to having A. fib in the past but no recent documentation on ICD evaluation  His echo shows severely dilated right side with RVSP of 80 mmHg concerning for right heart failure    Past medical history:    has a past medical history of Acid reflux, Acute MI ([address] Arthritis, Broken teeth, CAD (coronary artery disease), Cardiomyopathy ([address] CHF (congestive heart failure) ([address] Chronic back pain, Chronic kidney disease, Diabetes mellitus ([address] Diabetic neuropathy ([address] H/O cardiovascular stress test, H/O Doppler ultrasound, H/O percutaneous left heart catheterization, History of irregular heartbeat, History of syncope, Hyperlipidemia, Hypertension, Leg swelling, Necrotic toes (HCC), Neuropathy, neuropathy, PAD (peripheral artery disease) ([address] PVD (peripheral vascular disease) ([address] Sick sinus syndrome ([address] Sleep apnea, Spinal stenosis, Teeth missing, Type 2 diabetes mellitus without complication ([address] and WD-Chronic foot ulcer, left, with necrosis of bone ([address]   Past surgical history:   has a past surgical history that includes Coronary angioplasty with stent; Dental surgery; Colonoscopy (08/04/2016); pacemaker placement (06/04/2010); vascular surgery; colectomy (Right, 08/26/2016); Toe amputation (Right, 09/12/2017); Toe amputation (Right, 01/09/2018); Cardiac catheterization; Cardiac defibrillator placement (06/04/2010); Coronary angioplasty; Cardiac catheterization (07/14/2017); Cardiac catheterization (11/20/2018); Toe amputation (Left, 12/26/2020); IR TUNNELED CVC PLACE WO SQ PORT/PUMP > 5 YEARS (6/14/2021); and Toe amputation (Left, 7/26/2022). Social History:   reports that he quit smoking about a year ago. His smoking use included cigars and cigarettes. He started smoking about 42 years ago. He has a 9.00 pack-year smoking history. He has never used smokeless tobacco. He reports current drug use. Drug: Marijuana Remy Passy). He reports that he does not drink alcohol.   Family history:   no family history of CAD, STROKE of DM at early age    Allergies   Allergen Reactions    Pcn [Penicillins] Hives    Fentanyl Itching       albuterol sulfate HFA (PROVENTIL;VENTOLIN;PROAIR) 108 (90 Base) MCG/ACT inhaler 2 puff, Q4H PRN  amLODIPine (NORVASC) tablet 10 mg, Daily  atorvastatin (LIPITOR) tablet 40 mg, Nightly  [place] Calcium Dressing 4\"x8 MISC 1 Device, Daily  carvedilol (COREG) tablet 3.125 mg, BID  clopidogrel (PLAVIX) tablet 75 mg, Daily  gabapentin (NEURONTIN) capsule 300 mg, TID  metaxalone (SKELAXIN) tablet 800 mg  ++NON FORMULARY++  (Patient Supplied), TID  oxyCODONE-acetaminophen (PERCOCET) 5-325 MG per tablet 1 tablet, BID PRN  tiotropium (SPIRIVA RESPIMAT) 2.5 MCG/ACT inhaler 2 puff, Daily  sodium chloride flush 0.9 % injection 5-40 mL, 2 times per day  sodium chloride flush 0.9 % injection 5-40 mL, PRN  0.9 % sodium chloride infusion, PRN  enoxaparin Sodium (LOVENOX) injection 30 mg, BID  acetaminophen (TYLENOL) tablet 650 mg, Q4H PRN  ondansetron (ZOFRAN-ODT) disintegrating tablet 4 mg, Q8H PRN Or  ondansetron (ZOFRAN) injection 4 mg, Q6H PRN  glucose chewable tablet 16 g, PRN  dextrose bolus 10% 125 mL, PRN   Or  dextrose bolus 10% 250 mL, PRN  glucagon (rDNA) injection 1 mg, PRN  dextrose 10 % infusion, Continuous PRN  insulin lispro (HUMALOG) injection vial 0-4 Units, TID WC  insulin lispro (HUMALOG) injection vial 0-4 Units, Nightly  furosemide (LASIX) injection 20 mg, BID  collagenase ointment, Daily    Current Facility-Administered Medications   Medication Dose Route Frequency Provider Last Rate Last Admin    albuterol sulfate HFA (PROVENTIL;VENTOLIN;PROAIR) 108 (90 Base) MCG/ACT inhaler 2 puff  2 puff Inhalation Q4H PRN Atul Quick MD        amLODIPine (NORVASC) tablet 10 mg  10 mg Oral Daily Atul Quick MD   10 mg at 10/13/22 3798    atorvastatin (LIPITOR) tablet 40 mg  40 mg Oral Nightly Atul Quick MD   40 mg at 10/12/22 2052    Al[place]ell Calcium Dressing 4\"x8 MISC 1 Device  1 Device Apply externally Daily Atul Quick MD   1 Device at 10/12/22 1412    carvedilol (COREG) tablet 3.125 mg  3.125 mg Oral BID Atul Quick MD   3.125 mg at 10/13/22 6990    clopidogrel (PLAVIX) tablet 75 mg  75 mg Oral Daily Erin Calvillo MD   75 mg at 10/13/22 2252    gabapentin (NEURONTIN) capsule 300 mg  300 mg Oral TID Atul Quick MD   300 mg at 10/13/22 8980    metaxalone (SKELAXIN) tablet 800 mg  ++NON FORMULARY++  (Patient Supplied)  800 mg Oral TID Atul Quick MD        oxyCODONE-acetaminophen (PERCOCET) 5-325 MG per tablet 1 tablet  1 tablet Oral BID PRN Atul Quick MD   1 tablet at 10/13/22 0429    tiotropium (SPIRIVA RESPIMAT) 2.5 MCG/ACT inhaler 2 puff  2 puff Inhalation Daily Atul Quick MD   2 puff at 10/13/22 0847    sodium chloride flush 0.9 % injection 5-40 mL  5-40 mL IntraVENous 2 times per day Atul Quick MD   10 mL at 10/13/22 0922    sodium chloride flush 0.9 % injection 5-40 mL  5-40 mL IntraVENous PRN Atul Quick MD        0.9 % sodium chloride infusion IntraVENous PRN Quynh Carrasco MD        enoxaparin Sodium (LOVENOX) injection 30 mg  30 mg SubCUTAneous BID Quynh Carrasco MD   30 mg at 10/13/22 7121    acetaminophen (TYLENOL) tablet 650 mg  650 mg Oral Q4H PRN Quynh Carrasco MD        ondansetron (ZOFRAN-ODT) disintegrating tablet 4 mg  4 mg Oral Q8H PRN Quynh Carrasco MD        Or    ondansetron (ZOFRAN) injection 4 mg  4 mg IntraVENous Q6H PRN Quynh Carrasco MD        glucose chewable tablet 16 g  4 tablet Oral PRN Quynh Carrasco MD        dextrose bolus 10% 125 mL  125 mL IntraVENous PRN Quynh Carrasco MD        Or    dextrose bolus 10% 250 mL  250 mL IntraVENous PRN Quynh Carrasco MD        glucagon (rDNA) injection 1 mg  1 mg SubCUTAneous PRN Quynh Carrasco MD        dextrose 10 % infusion   IntraVENous Continuous PRN Quynh Carrasco MD        insulin lispro (HUMALOG) injection vial 0-4 Units  0-4 Units SubCUTAneous TID WC Quynh Carrasco MD        insulin lispro (HUMALOG) injection vial 0-4 Units  0-4 Units SubCUTAneous Nightly Quynh Carrasco MD        furosemide (LASIX) injection 20 mg  20 mg IntraVENous BID Ritchie Magana MD   20 mg at 10/13/22 1006    collagenase ointment   Topical Daily MICKY Segal - CNP   Given at 10/13/22 1872     Review of Systems:   Constitutional: No Fever or Weight Loss   Eyes: No Decreased Vision  ENT: No Headaches, Hearing Loss or Vertigo  Cardiovascular: As per HPI  Respiratory: As per HPI  Gastrointestinal: No abdominal pain, appetite loss, blood in stools, constipation, diarrhea or heartburn  Genitourinary: No dysuria, trouble voiding, or hematuria  Musculoskeletal:  No gait disturbance, weakness or joint complaints  Integumentary: No rash or pruritis  Neurological: No TIA or stroke symptoms  Psychiatric: No anxiety or depression  Endocrine: No malaise, fatigue or temperature intolerance  Hematologic/Lymphatic: No bleeding problems, blood clots or swollen lymph nodes  Allergic/Immunologic: No nasal congestion or hives  All systems negative except as marked. Physical Examination:    Vitals:    10/12/22 2054 10/13/22 0359 10/13/22 0847 10/13/22 0900   BP: (!) 142/64 (!) 146/74  130/61   Pulse: 60 63 64 62   Resp: 19 18 14 13   Temp: 97.8 °F (36.6 °C) 97.9 °F (36.6 °C)  97.7 °F (36.5 °C)   TempSrc: Oral Oral  Oral   SpO2:  93%  93%   Weight:  276 lb 4.8 oz (125.3 kg)     Height:           General Appearance:  No distress, conversant    Constitutional:  Well developed, Well nourished, No acute distress, Non-toxic appearance. HENT:  Normocephalic, Atraumatic, Bilateral external ears normal, Oropharynx moist, No oral exudates, Nose normal. Neck- Normal range of motion, No tenderness, Supple, No stridor,no apical-carotid delay  Lymphatics : no palpable lymph nodes  Eyes:  PERRL, EOMI, Conjunctiva normal, No discharge. Respiratory:  Normal breath sounds, No respiratory distress, No wheezing, No chest tenderness. ,no use of accessory muscles, crackles Present  Cardiovascular: (PMI) apex non displaced,no lifts no thrills, ankle swelling Present , 1+, s1 and s2 audible,Murmur. Present, JVD not noted    Abdomen /GI:  Bowel sounds normal, Soft, No tenderness, No masses, No gross visceromegaly   :  No costovertebral angle tenderness   Musculoskeletal:  2+ edema, no tenderness, no deformities.  Back- no tenderness  Integument:  Well hydrated, no rash   Lymphatic:  No lymphadenopathy noted   Neurologic:  Alert & oriented x 3, CN 2-12 normal, normal motor function, normal sensory function, no focal deficits noted           Medical decision making and Data review:    Lab Review   Recent Labs     10/13/22  0917   WBC 5.5   HGB 9.1*   HCT 30.8*         Recent Labs     10/12/22  0628      K 4.6      CO2 23   BUN 33*   CREATININE 2.2*     Recent Labs     10/11/22  2333   AST 35   ALT 11   BILITOT 0.9   ALKPHOS 70     Recent Labs     10/11/22  2333 10/12/22  1321   TROPONINT 0.056* 0.074*       Recent Labs 10/11/22  2333   PROBNP 8,657*     Lab Results   Component Value Date    INR 1.03 08/30/2021    PROTIME 13.3 08/30/2021       EKG: (reviewed by myself)    ECHO:(reviewed by myself)    Chest Xray:(reviewed by myself)  XR CHEST PORTABLE    Result Date: 10/11/2022  EXAMINATION: ONE XRAY VIEW OF THE CHEST 10/11/2022 11:22 pm COMPARISON: June 22, 2022 HISTORY: ORDERING SYSTEM PROVIDED HISTORY: chest pain TECHNOLOGIST PROVIDED HISTORY: Reason for exam:->chest pain Reason for Exam: chest pain FINDINGS: There is mild pulmonary vascular congestion. No effusion is identified. The heart size is normal.  A pulse generator is present over the left anterior chest wall with 2 leads projecting over the heart. Mild pulmonary vascular congestion. All labs, medications and tests reviewed by myself including data  from outside source , patient and available family . Continue all other medications of all above medical condition listed as is. Impression:  Principal Problem:    Heart failure exacerbated by sotalol [facility])  Active Problems:    Precordial pain    Diabetic ulcer of right foot due to type 2 diabetes mellitus (HCC)    CHF (congestive heart failure), NYHA class I, acute on chronic, combined ([address]    PAD (peripheral artery disease) ([facility])    Biventricular ICD (implantable cardioverter-defibrillator) in place  Resolved Problems:    * No resolved hospital problems. *      Assessment: 67 y. o.year old with PMH of  has a past medical history of Acid reflux, Acute MI ([address] Arthritis, Broken teeth, CAD (coronary artery disease), Cardiomyopathy ([address] CHF (congestive heart failure) ([address] Chronic back pain, Chronic kidney disease, Diabetes mellitus ([address] Diabetic neuropathy ([address] H/O cardiovascular stress test, H/O Doppler ultrasound, H/O percutaneous left heart catheterization, History of irregular heartbeat, History of syncope, Hyperlipidemia, Hypertension, Leg swelling, Necrotic toes (HCC), Neuropathy, neuropathy, PAD (peripheral artery disease) ([facility] PVD (peripheral vascular disease) ([facility] Sick sinus syndrome ([facility] Sleep apnea, Spinal stenosis, Teeth missing, Type 2 diabetes mellitus without complication ([facility] and WD-Chronic foot ulcer, left, with necrosis of bone ([facility] Plan and Recommendations:    Echo is concerning for right heart failure with dilated right side and RVSP in 80s bilateral lower extremity swelling and scrotal swelling  Start milrinone drip increase Lasix to 40 twice daily he is in renal failure probably due to venous renal congestion  CHF: Heart failure with preserved EF abnormal troponin in the setting of abnormal kidney function  HTN: stable, continue present medications  Historically class compliance is also been an issue  Multiple leg wounds ulcers skin tears due to venous congestion wound care as per primary team check for arterial Doppler as well he has history of significant peripheral arterial disease  DVT prophylaxis if no contraindication  6. Dyslipidemia: continue statins           Thank you  much for consult and giving us the opportunity in contributing in the care of this patient. Please feel free to call me for any questions.        Krissy Her MD, 10/13/2022 10:13 AM

## 2022-10-13 NOTE — PROGRESS NOTES
Spoke with Dr. Jose C Schultz regarding patient receiving a PICC line. He does not want him to have it.

## 2022-10-14 PROBLEM — I50.33 ACUTE ON CHRONIC DIASTOLIC CHF (CONGESTIVE HEART FAILURE) (HCC): Status: ACTIVE | Noted: 2022-10-14

## 2022-10-14 PROBLEM — M79.89 LEG SWELLING: Status: ACTIVE | Noted: 2022-10-14

## 2022-10-14 LAB
ALBUMIN SERPL-MCNC: 2.7 GM/DL (ref 3.4–5)
ALP BLD-CCNC: 81 IU/L (ref 40–128)
ALT SERPL-CCNC: 6 U/L (ref 10–40)
ANION GAP SERPL CALCULATED.3IONS-SCNC: 7 MMOL/L (ref 4–16)
AST SERPL-CCNC: 11 IU/L (ref 15–37)
BILIRUB SERPL-MCNC: 1.1 MG/DL (ref 0–1)
BUN BLDV-MCNC: 36 MG/DL (ref 6–23)
CALCIUM SERPL-MCNC: 8 MG/DL (ref 8.3–10.6)
CHLORIDE BLD-SCNC: 106 MMOL/L (ref 99–110)
CO2: 26 MMOL/L (ref 21–32)
CREAT SERPL-MCNC: 2.2 MG/DL (ref 0.9–1.3)
GFR AFRICAN AMERICAN: 36 ML/MIN/1.73M2
GFR NON-AFRICAN AMERICAN: 30 ML/MIN/1.73M2
GLUCOSE BLD-MCNC: 153 MG/DL (ref 70–99)
GLUCOSE BLD-MCNC: 172 MG/DL (ref 70–99)
GLUCOSE BLD-MCNC: 175 MG/DL (ref 70–99)
GLUCOSE BLD-MCNC: 203 MG/DL (ref 70–99)
GLUCOSE BLD-MCNC: 253 MG/DL (ref 70–99)
MAGNESIUM: 1.7 MG/DL (ref 1.8–2.4)
PHOSPHORUS: 3.5 MG/DL (ref 2.5–4.9)
POTASSIUM SERPL-SCNC: 4.4 MMOL/L (ref 3.5–5.1)
SODIUM BLD-SCNC: 139 MMOL/L (ref 135–145)
TOTAL PROTEIN: 5.5 GM/DL (ref 6.4–8.2)

## 2022-10-14 PROCEDURE — 96376 TX/PRO/DX INJ SAME DRUG ADON: CPT

## 2022-10-14 PROCEDURE — 1200000000 HC SEMI PRIVATE

## 2022-10-14 PROCEDURE — G0378 HOSPITAL OBSERVATION PER HR: HCPCS

## 2022-10-14 PROCEDURE — 82962 GLUCOSE BLOOD TEST: CPT

## 2022-10-14 PROCEDURE — 6360000002 HC RX W HCPCS: Performed by: INTERNAL MEDICINE

## 2022-10-14 PROCEDURE — 6370000000 HC RX 637 (ALT 250 FOR IP): Performed by: STUDENT IN AN ORGANIZED HEALTH CARE EDUCATION/TRAINING PROGRAM

## 2022-10-14 PROCEDURE — 6370000000 HC RX 637 (ALT 250 FOR IP): Performed by: INTERNAL MEDICINE

## 2022-10-14 PROCEDURE — 2580000003 HC RX 258: Performed by: INTERNAL MEDICINE

## 2022-10-14 PROCEDURE — 96372 THER/PROPH/DIAG INJ SC/IM: CPT

## 2022-10-14 PROCEDURE — 96365 THER/PROPH/DIAG IV INF INIT: CPT

## 2022-10-14 PROCEDURE — 99233 SBSQ HOSP IP/OBS HIGH 50: CPT | Performed by: INTERNAL MEDICINE

## 2022-10-14 PROCEDURE — 80053 COMPREHEN METABOLIC PANEL: CPT

## 2022-10-14 PROCEDURE — 6360000002 HC RX W HCPCS: Performed by: STUDENT IN AN ORGANIZED HEALTH CARE EDUCATION/TRAINING PROGRAM

## 2022-10-14 PROCEDURE — 83735 ASSAY OF MAGNESIUM: CPT

## 2022-10-14 PROCEDURE — 99232 SBSQ HOSP IP/OBS MODERATE 35: CPT | Performed by: PHYSICIAN ASSISTANT

## 2022-10-14 PROCEDURE — 84100 ASSAY OF PHOSPHORUS: CPT

## 2022-10-14 RX ORDER — CARVEDILOL 6.25 MG/1
6.25 TABLET ORAL 2 TIMES DAILY
Status: DISCONTINUED | OUTPATIENT
Start: 2022-10-14 | End: 2022-10-24 | Stop reason: HOSPADM

## 2022-10-14 RX ORDER — HYDRALAZINE HYDROCHLORIDE 25 MG/1
25 TABLET, FILM COATED ORAL EVERY 8 HOURS SCHEDULED
Status: DISCONTINUED | OUTPATIENT
Start: 2022-10-14 | End: 2022-10-24 | Stop reason: HOSPADM

## 2022-10-14 RX ORDER — ISOSORBIDE MONONITRATE 30 MG/1
30 TABLET, EXTENDED RELEASE ORAL DAILY
Status: DISCONTINUED | OUTPATIENT
Start: 2022-10-14 | End: 2022-10-24 | Stop reason: HOSPADM

## 2022-10-14 RX ORDER — LANOLIN ALCOHOL/MO/W.PET/CERES
400 CREAM (GRAM) TOPICAL 2 TIMES DAILY
Status: DISCONTINUED | OUTPATIENT
Start: 2022-10-14 | End: 2022-10-24 | Stop reason: HOSPADM

## 2022-10-14 RX ORDER — MAGNESIUM SULFATE IN WATER 40 MG/ML
2000 INJECTION, SOLUTION INTRAVENOUS ONCE
Status: COMPLETED | OUTPATIENT
Start: 2022-10-14 | End: 2022-10-14

## 2022-10-14 RX ADMIN — ATORVASTATIN CALCIUM 40 MG: 40 TABLET, FILM COATED ORAL at 20:53

## 2022-10-14 RX ADMIN — CARVEDILOL 6.25 MG: 6.25 TABLET, FILM COATED ORAL at 20:53

## 2022-10-14 RX ADMIN — HYDRALAZINE HYDROCHLORIDE 25 MG: 25 TABLET ORAL at 20:53

## 2022-10-14 RX ADMIN — SODIUM CHLORIDE, PRESERVATIVE FREE 10 ML: 5 INJECTION INTRAVENOUS at 20:52

## 2022-10-14 RX ADMIN — Medication 400 MG: at 20:53

## 2022-10-14 RX ADMIN — HYDRALAZINE HYDROCHLORIDE 25 MG: 25 TABLET ORAL at 14:38

## 2022-10-14 RX ADMIN — ISOSORBIDE MONONITRATE 30 MG: 30 TABLET, EXTENDED RELEASE ORAL at 14:38

## 2022-10-14 RX ADMIN — OXYCODONE AND ACETAMINOPHEN 1 TABLET: 5; 325 TABLET ORAL at 20:53

## 2022-10-14 RX ADMIN — CLOPIDOGREL BISULFATE 75 MG: 75 TABLET ORAL at 09:41

## 2022-10-14 RX ADMIN — MAGNESIUM SULFATE HEPTAHYDRATE 2000 MG: 2 INJECTION, SOLUTION INTRAVENOUS at 09:54

## 2022-10-14 RX ADMIN — AMLODIPINE BESYLATE 10 MG: 10 TABLET ORAL at 09:40

## 2022-10-14 RX ADMIN — TIOTROPIUM BROMIDE INHALATION SPRAY 2 PUFF: 3.12 SPRAY, METERED RESPIRATORY (INHALATION) at 07:56

## 2022-10-14 RX ADMIN — FUROSEMIDE 60 MG: 10 INJECTION, SOLUTION INTRAVENOUS at 17:58

## 2022-10-14 RX ADMIN — MILRINONE LACTATE 0.38 MCG/KG/MIN: 200 INJECTION, SOLUTION INTRAVENOUS at 21:28

## 2022-10-14 RX ADMIN — FUROSEMIDE 60 MG: 10 INJECTION, SOLUTION INTRAVENOUS at 09:44

## 2022-10-14 RX ADMIN — GABAPENTIN 300 MG: 300 CAPSULE ORAL at 09:40

## 2022-10-14 RX ADMIN — GABAPENTIN 300 MG: 300 CAPSULE ORAL at 20:53

## 2022-10-14 RX ADMIN — COLLAGENASE SANTYL: 250 OINTMENT TOPICAL at 09:41

## 2022-10-14 RX ADMIN — CARVEDILOL 3.12 MG: 6.25 TABLET, FILM COATED ORAL at 09:40

## 2022-10-14 RX ADMIN — GABAPENTIN 300 MG: 300 CAPSULE ORAL at 14:38

## 2022-10-14 RX ADMIN — SODIUM CHLORIDE, PRESERVATIVE FREE 10 ML: 5 INJECTION INTRAVENOUS at 09:45

## 2022-10-14 RX ADMIN — MILRINONE LACTATE 0.38 MCG/KG/MIN: 200 INJECTION, SOLUTION INTRAVENOUS at 05:52

## 2022-10-14 RX ADMIN — INSULIN LISPRO 2 UNITS: 100 INJECTION, SOLUTION INTRAVENOUS; SUBCUTANEOUS at 12:24

## 2022-10-14 RX ADMIN — ENOXAPARIN SODIUM 30 MG: 100 INJECTION SUBCUTANEOUS at 09:40

## 2022-10-14 RX ADMIN — Medication 400 MG: at 09:40

## 2022-10-14 ASSESSMENT — ENCOUNTER SYMPTOMS
BACK PAIN: 0
APNEA: 0
ALLERGIC/IMMUNOLOGIC NEGATIVE: 1
COLOR CHANGE: 0
STRIDOR: 0
CHOKING: 0
EYES NEGATIVE: 1
CONSTIPATION: 0
RECTAL PAIN: 0
SORE THROAT: 0
PHOTOPHOBIA: 0
ANAL BLEEDING: 0
GASTROINTESTINAL NEGATIVE: 1
SHORTNESS OF BREATH: 1
EYE ITCHING: 0
EYE REDNESS: 0

## 2022-10-14 ASSESSMENT — PAIN SCALES - GENERAL: PAINLEVEL_OUTOF10: 9

## 2022-10-14 ASSESSMENT — PAIN DESCRIPTION - LOCATION: LOCATION: LEG

## 2022-10-14 NOTE — CARE COORDINATION
LSW followed up with pt regarding his discharge plans. LSW spoke with pt regarding PT/OT recommendation for SNF. Pt stated that he is no way going to a nursing home. LSW explained that it would be in the rehab part of the NH. Pt stated he is not going. Pt stated he has been in a NH before and had a bad experience and he will never go to a NH. LSW informed pt that he can choose a different NH for rehab. Pt is adamantly refusing to go to SNF. Pt will go home and continue with his HC. Pt is active with [facility]. LSW informed NP that pt is refusing SNF and plans home with family and [facility]. CM will need a inpt HC order for nursing and PT/OT at discharge. If pt is discharged after hours or over the weekend please call [facility] [phone] and inform them of discharge and the need to resume services. Fax HC order and AVS to [phone].

## 2022-10-14 NOTE — PROGRESS NOTES
General Surgery  Dr. Oren Hoff and Sancho Du, [facility] Day: 4    Chief Complaint on Admission: SOB, fluid overload      Subjective:     Michelle Washington is a 67 y.o. male with   Bilateral foot wounds admitted for heart failure. Patient reports Bilateral leg swelling, pain at rest and difficulty with ambulation 2/2 the pain. Denies pain to the wounds or feet. Denies fevers/chills. Tolerating ADULT DIET; Regular; Low Sodium (2 gm). ROS:  Review of Systems   Constitutional:  Negative for chills and fever. HENT:  Negative for ear pain, mouth sores, sore throat and tinnitus. Eyes:  Negative for photophobia, redness and itching. Respiratory:  Negative for apnea, choking and stridor. Cardiovascular:  Positive for chest pain (mild) and leg swelling. Negative for palpitations. Gastrointestinal:  Negative for anal bleeding, constipation and rectal pain. Endocrine: Negative for polydipsia. Genitourinary:  Negative for enuresis, flank pain and hematuria. Musculoskeletal:  Positive for arthralgias, gait problem and joint swelling. Negative for back pain and myalgias. Skin:  Positive for wound (Bilateral feet). Negative for color change and pallor. Allergic/Immunologic: Negative for environmental allergies. Neurological:  Negative for syncope and speech difficulty. Psychiatric/Behavioral:  Negative for confusion and hallucinations.       Allergies  Pcn [penicillins] and Fentanyl          Diagnosis Date    Acid reflux     Acute MI ([facility] [address] 2004, 2008    Arthritis     Back    Broken teeth     Upper Front    CAD (coronary artery disease)     Sees Dr. Bobby Truj[facility])     per old chart    CHF (congestive heart failure) ([facility] [address]     Chronic back pain     Chronic kidney disease     STAGE 3 KIDNEY FAILURE- \"from my diabetes- do not follow with any one- have seen Dr Gilberto Hicks in the past\"    Diabetes mellitus ([facility] [address] Dx 1965    per old chart pt has been diabetic since age 13    Diabetic neuropathy ([facility] [address]     \"in my feet\"    H/O cardiovascular stress test 2016    H/O Doppler ultrasound 2018    Moderate disease of the right lower extremity with an JALEN of 0.72. Moderate to severe disease of the left lower extremity with an JALEN of 0.55. 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    Neuropathy     both feet    neuropathy     PAD (peripheral artery disease) ([address] 2018    PVD (peripheral vascular disease) ([address]     Sick sinus syndrome (HCC)     Sleep apnea     \"sleep study 3 yrs ago- could not tolerate the cpap made me too dry\"    Spinal stenosis     Teeth missing     Upper And Lower    Type 2 diabetes mellitus without complication ([facility] [address]     WD-Chronic foot ulcer, left, with necrosis of bone ([address] 2021       Objective:     Vitals:    10/14/22 0931   BP: (!) 131/46   Pulse: 73   Resp: 10   Temp: 97.8 °F (36.6 °C)   SpO2: 90%       TEMPERATURE:  Current - Temp: 97.8 °F (36.6 °C); Max - Temp  Av.8 °F (36.6 °C)  Min: 97.5 °F (36.4 °C)  Max: 98.1 °F (36.7 °C)    I/O this shift:  In: 240 [P.O.:240]  Out: 900 [Urine:900]I/O last 3 completed shifts:  In: -   Out: 1450 [Urine:1450]      Physical Exam:  Physical Exam  Constitutional:       Appearance: He is well-developed. HENT:      Head: Normocephalic. Eyes:      Pupils: Pupils are equal, round, and reactive to light. Cardiovascular:      Rate and Rhythm: Normal rate. Pulmonary:      Effort: Pulmonary effort is normal.   Abdominal:      General: There is no distension. Palpations: Abdomen is soft. There is no mass. Tenderness:  There is no abdominal 10/14/2022    LABGLOM 30 (L) 10/14/2022    GFRAA 36 (L) 10/14/2022       Assessment:     Patient Active Problem List:     PAD (peripheral artery disease) ([address]     Chronic coronary artery disease     Biventricular ICD (implantable cardioverter-defibrillator) in place     Chronic combined systolic and diastolic heart failure (HCC)     Chronic kidney disease, stage III (moderate) ([facility])     Mixed hyperlipidemia     Sick sinus syndrome ([facility])     Type 2 diabetes mellitus with diabetic polyneuropathy ([address]     Spinal stenosis of lumbar region     Obesity, Class I, BMI 30-34.9     S/P partial colectomy     Tubulovillous adenoma of colon     Microalbuminuria     WD-PVD (peripheral vascular disease) ([facility])     Limb ischemia     Necrotic toes ([facility])     Toe gangrene ([facility])     Diabetic foot infection ([address]     Chronic kidney disease (CKD) stage G3a/A2, moderately decreased glomerular filtration rate (GFR) between 45-59 mL/min/1.73 square meter and albuminuria creatinine ratio between  mg/g ([facility])     Edema     ICD (implantable cardioverter-defibrillator) battery depletion     Hyperkalemia     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[facility])     Ischemia of toe     Acute kidney injury ([address]     Fluid overload     DM (diabetes mellitus) ([facility])     Precordial pain     Acute chest pain     Unstable angina ([facility])     Chronic kidney disease (CKD) stage G3a/A3, moderately decreased glomerular filtration rate (GFR) between 45-59 mL/min/1.73 square meter and albuminuria creatinine ratio greater than 300 mg/g (HCC)     Cardiomyopathy (HCC)     Diabetic neuropathy ([facility])     HTN (hypertension)     Epigastric pain     Acute on chronic congestive heart failure ([facility])     Leg edema     Acute on chronic systolic CHF (congestive heart failure) ([facility])     Diabetic foot ulcer with osteomyelitis ([address]     Visit for wound check     Moderate malnutrition ([address]     Long term (current) use of antibiotics     WD-Diabetic ulcer of toe of right foot associated with type 2 diabetes mellitus, with fat layer exposed ([address]     Diabetic ulcer of toe associated with type 2 diabetes mellitus, with bone involvement without evidence of necrosis ([address]     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     Persistent wound pain     Receiving intravenous antibiotic treatment as outpatient     Acute on chronic respiratory failure with hypoxemia ([address]     WD-Chronic foot ulcer, left, with necrosis of bone (HCC)     Acute on chronic HFrEF (heart failure with reduced ejection fraction) (HCC)     Scrotal edema     Hypertensive urgency     Diabetic ulcer of right foot due to type 2 diabetes mellitus ([address]     Cellulitis of foot     Toe osteomyelitis (HCC)     Heart failure exacerbated by sotalol (HCC)     CHF (congestive heart failure), NYHA class I, acute on chronic, combined (HCC)     Acute on chronic diastolic CHF (congestive heart failure) ([address]        Plan:     Continue local wound care to bilateral feet with santyl. Change dressings daily.  Will need to resume home care upon d/c.     Calvin Khalil PA-C

## 2022-10-14 NOTE — PROGRESS NOTES
V2.0  [facility] Hospitalist Progress Note      Name:  Andrey Heaton /Age/Sex: 1950  (67 y.o. male)   MRN & CSN:  [id number] & [id number] Encounter Date/Time: 10/14/2022 3:00 PM EDT    Location:  [id number] PCP: Dimitri Baird Day: 4    Assessment and Plan:   Andrey Heaton is a 67 y.o. male with pmh of CAD, CHF, stage III, T2DM who presents with shortness of breath, scrotal swelling, who presents with Heart failure exacerbated by sotalol ([facility] [address]      Plan:  Decompensated diastolic congestive heart failure  -shortness of breath and bilateral lower extremities swelling and scrotal swelling  -appreciate cardiology input: increase lasix to BID and started milrinone drip   -improving legs swelling today  -keep monitor     Chronic kidney disease stage 4  -creatine 2.2  -appreciate nephrology input: Accurately watched urine output-if suboptimal urine output such as less than 3 L-either intensify the loop or add thiazide-but watch closely for intravascular volume status, especially with right heart failure-unfortunately we do not have \"silver bullet\"-have to use her clinical judgment with other data Watch for iatrogenic nosocomial complication, follow clinically and biochemically    Multiple wounds   -on bilateral feet and scrotum  -appreciate surgery input: continue wound care daily    Chronic respiratory failure with hypoxia  COPD  -no exacerbation  -3 L NC home oxygen, baseline  -keep monitor    Hypertension  -continue home medis    CAD  AICD  -continue plavix, statin and beta-blocker    Hyperlipidemia  -continue statin    Type 2 diabetes  -hemoglobin 7.1  -SSI  -monitor BS          Diet ADULT DIET; Regular;  Low Sodium (2 gm)   DVT Prophylaxis [x] Lovenox, []  Heparin, [] SCDs, [] Ambulation,  [] Eliquis, [] Xarelto  [] Coumadin   Code Status Full Code   Disposition From: home  Expected Disposition: home  Estimated Date of Discharge: 1-2 days  Patient requires continued admission due to medical conditon   Surrogate Decision Maker/ POA      Subjective:     Chief Complaint: Leg Swelling (B/L)       Cherelle Santiago is a 67 y.o. male is evaluated in room. Pt reported improving shortness of breath and leg swelling. Pt is on milrinone drip. Review of Systems:    Review of Systems   Constitutional: Negative. HENT: Negative. Eyes: Negative. Respiratory:  Positive for shortness of breath. Cardiovascular:  Positive for leg swelling. Gastrointestinal: Negative. Endocrine: Negative. Genitourinary: Negative. Musculoskeletal: Negative. Skin: Negative. Allergic/Immunologic: Negative. Neurological: Negative. Hematological: Negative. Psychiatric/Behavioral: Negative. Objective: Intake/Output Summary (Last 24 hours) at 10/14/2022 1515  Last data filed at 10/14/2022 1206  Gross per 24 hour   Intake 240 ml   Output 900 ml   Net -660 ml        Vitals:   Vitals:    10/14/22 1427   BP: (!) 133/51   Pulse: 72   Resp: 11   Temp:    SpO2:        Physical Exam:     General: NAD  Eyes: EOMI  ENT: neck supple  Cardiovascular: Regular rate. Respiratory: Clear to auscultation  Gastrointestinal: Soft, non tender  Genitourinary: no suprapubic tenderness  Musculoskeletal: improving bilateral legs swelling  Skin: warm, dry, bilateral feet covered with dressing, the dressing is clean and dry  Neuro: Alert. Psych: Mood appropriate.      Medications:   Medications:    magnesium oxide  400 mg Oral BID    carvedilol  6.25 mg Oral BID    hydrALAZINE  25 mg Oral 3 times per day    isosorbide mononitrate  30 mg Oral Daily    furosemide  60 mg IntraVENous BID    lidocaine PF  5 mL IntraDERmal Once    sodium chloride flush  5-40 mL IntraVENous 2 times per day    atorvastatin  40 mg Oral Nightly    clopidogrel  75 mg Oral Daily    gabapentin  300 mg Oral TID    metaxalone  800 mg Oral TID    tiotropium  2 puff Inhalation Daily    sodium chloride flush  5-40 mL IntraVENous 2 times per day    enoxaparin  30 mg SubCUTAneous BID    insulin lispro  0-4 Units SubCUTAneous TID WC    insulin lispro  0-4 Units SubCUTAneous Nightly    collagenase   Topical Daily      Infusions:    milrinone 0.375 mcg/kg/min (10/14/22 0552)    sodium chloride      sodium chloride      dextrose       PRN Meds: sodium chloride flush, 5-40 mL, PRN  sodium chloride, , PRN  albuterol sulfate HFA, 2 puff, Q4H PRN  oxyCODONE-acetaminophen, 1 tablet, BID PRN  sodium chloride flush, 5-40 mL, PRN  sodium chloride, , PRN  acetaminophen, 650 mg, Q4H PRN  ondansetron, 4 mg, Q8H PRN   Or  ondansetron, 4 mg, Q6H PRN  glucose, 4 tablet, PRN  dextrose bolus, 125 mL, PRN   Or  dextrose bolus, 250 mL, PRN  glucagon (rDNA), 1 mg, PRN  dextrose, , Continuous PRN      Labs      Recent Results (from the past 24 hour(s))   POCT Glucose    Collection Time: 10/13/22  5:01 PM   Result Value Ref Range    POC Glucose 191 (H) 70 - 99 MG/DL   Urinalysis    Collection Time: 10/13/22  5:15 PM   Result Value Ref Range    Color, UA YELLOW     Clarity, UA CLEAR     Glucose, Urine NEGATIVE MG/DL    Bilirubin Urine NEGATIVE     Ketones, Urine NEGATIVE MG/DL    Specific Gravity, UA 1.015     Blood, Urine NEGATIVE     pH, Urine 5.0     Protein, UA NEGATIVE MG/DL    Urobilinogen, Urine 0.2 MG/DL    Nitrite Urine, Quantitative NEGATIVE     Leukocyte Esterase, Urine TRACE    Sodium, urine, random    Collection Time: 10/13/22  5:15 PM   Result Value Ref Range    Sodium, Ur 114 35 - 167 MMOL/L   Protein / creatinine ratio, urine    Collection Time: 10/13/22  5:15 PM   Result Value Ref Range    Urine Total Protein 15.4 (H) <12 MG/DL    Creatinine, Ur 35.3 (L) 39 - 259 MG/DL    Prot/Creat Ratio, Ur 0.4 (H) <0.2   Microscopic Urinalysis    Collection Time: 10/13/22  5:15 PM   Result Value Ref Range    RBC, UA NONE SEEN /HPF    WBC, UA <1 /HPF    Bacteria, UA NEGATIVE /HPF    Trichomonas, UA NONE SEEN /HPF   POCT Glucose    Collection Time: 10/13/22  8:56 PM Result Value Ref Range    POC Glucose 182 (H) 70 - 99 MG/DL   Comprehensive Metabolic Panel    Collection Time: 10/14/22  5:40 AM   Result Value Ref Range    Sodium 139 135 - 145 MMOL/L    Potassium 4.4 3.5 - 5.1 MMOL/L    Chloride 106 99 - 110 mMol/L    CO2 26 21 - 32 MMOL/L    BUN 36 (H) 6 - 23 MG/DL    Creatinine 2.2 (H) 0.9 - 1.3 MG/DL    Glucose 175 (H) 70 - 99 MG/DL    Calcium 8.0 (L) 8.3 - 10.6 MG/DL    Albumin 2.7 (L) 3.4 - 5.0 GM/DL    Total Protein 5.5 (L) 6.4 - 8.2 GM/DL    Total Bilirubin 1.1 (H) 0.0 - 1.0 MG/DL    ALT 6 (L) 10 - 40 U/L    AST 11 (L) 15 - 37 IU/L    Alkaline Phosphatase 81 40 - 128 IU/L    GFR Non-African American 30 (L) >60 mL/min/1.73m2    GFR  36 (L) >60 mL/min/1.73m2    Anion Gap 7 4 - 16   Phosphorus    Collection Time: 10/14/22  5:40 AM   Result Value Ref Range    Phosphorus 3.5 2.5 - 4.9 MG/DL   Magnesium    Collection Time: 10/14/22  5:40 AM   Result Value Ref Range    Magnesium 1.7 (L) 1.8 - 2.4 mg/dl   POCT Glucose    Collection Time: 10/14/22  8:37 AM   Result Value Ref Range    POC Glucose 172 (H) 70 - 99 MG/DL   POCT Glucose    Collection Time: 10/14/22 12:03 PM   Result Value Ref Range    POC Glucose 253 (H) 70 - 99 MG/DL        Imaging/Diagnostics Last 24 Hours   VL DUP LOWER EXTREMITY ARTERIES BILATERAL    Result Date: 10/14/2022  EXAMINATION: ARTERIAL DUPLEX ULTRASOUND OF THE BILATERAL LOWER EXTREMITIES  10/13/2022 8:49 pm TECHNIQUE: Duplex ultrasound using B-mode/gray scaled imaging, Doppler spectral analysis and color flow Doppler was obtained of the bilateral lower extremities. COMPARISON: None. HISTORY: ORDERING SYSTEM PROVIDED HISTORY: PVD/ non healing ulcers TECHNOLOGIST PROVIDED HISTORY: Reason for exam:->PVD/ non healing ulcers FINDINGS: Exam was extremely difficult due to lack of cooperation with the patient and extreme skin thickness and hardening in the bilateral calves. There is edema in the bilateral lower extremities.   There is atherosclerosis in the visualized arteries and there is an enlarged left groin lymph node measuring 1.4 x 2.4 x 2.7 cm in size. These are not significantly changed when compared to prior CT abdomen pelvis from 01/24/2022. There are grossly triphasic waveforms in the right leg from the common femoral artery to the popliteal artery and there are monophasic waveforms below the knee in the anterior tibial and posterior tibial arteries. There are grossly triphasic waveforms in the left common femoral and superficial femoral arteries. There are grossly monophasic waveforms in the popliteal artery and proximal anterior tibial and peroneal arteries which are not visualized distally. Flow velocities were measured as follows: Com. Fem. 175 cm/sec Prof.           143 cm/sec SFA Prox. 179 cm/sec SFA Mid.     105 cm/sec SFA Dist.     73 cm/sec Pop.           72 cm/sec PTA           68 cm/sec Peron. 25 cm/sec ET           not visualized cm/sec Left: Flow velocities were measured as follows: Com. Fem. 61 cm/sec Prof.           118 cm/sec SFA Prox. 70 cm/sec SFA Mid.     49 cm/sec SFA Dist.     75 cm/sec Pop.           33 cm/sec PTA           41 proximally cm/sec Peron. 41 proximally cm/sec TE           not visualized cm/sec     Abnormal waveforms in the left leg as described without visualization of the peroneal artery or distal anterior posterior tibial arteries suspicious for severe peripheral vascular disease. Conventional arteriography is recommended for further evaluation. Tardus parvus waveforms in the right leg below the knee in the anterior and posterior tibial arteries with nonvisualization of the peroneal artery.        Electronically signed by Ella Gilbert CNP on 10/14/2022 at 3:15 PM

## 2022-10-14 NOTE — PROGRESS NOTES
Nephrology Progress Note  10/14/2022 8:14 AM        Subjective:   Admit Date: 10/11/2022  PCP: Annelise Branch    Interval History: Reported feeling better    Diet: Reasonable    ROS: No shortness of breath, PND or orthopnea  Urine output recorded only 850 cc, likely not accurately recorded  No fever, acceptable blood pressure    Data:     Current meds:    furosemide  60 mg IntraVENous BID    lidocaine PF  5 mL IntraDERmal Once    sodium chloride flush  5-40 mL IntraVENous 2 times per day    amLODIPine  10 mg Oral Daily    atorvastatin  40 mg Oral Nightly    carvedilol  3.125 mg Oral BID    clopidogrel  75 mg Oral Daily    gabapentin  300 mg Oral TID    metaxalone  800 mg Oral TID    tiotropium  2 puff Inhalation Daily    sodium chloride flush  5-40 mL IntraVENous 2 times per day    enoxaparin  30 mg SubCUTAneous BID    insulin lispro  0-4 Units SubCUTAneous TID WC    insulin lispro  0-4 Units SubCUTAneous Nightly    collagenase   Topical Daily      milrinone 0.375 mcg/kg/min (10/14/22 0552)    sodium chloride      sodium chloride      dextrose           I/O last 3 completed shifts:  In: -   Out: 1450 [Urine:1450]    CBC:   Recent Labs     10/11/22  2333 10/13/22  0917   WBC 5.5 5.5   HGB 9.3* 9.1*    213          Recent Labs     10/12/22  0628 10/13/22  0917 10/14/22  0540    141  141 139   K 4.6 4.6  4.6 4.4    106  107 106   CO2 23 27  26 26   BUN 33* 35*  34* 36*   CREATININE 2.2* 2.2*  2.2* 2.2*   GLUCOSE 110* 132*  132* 175*       Lab Results   Component Value Date    CALCIUM 8.0 (L) 10/14/2022    PHOS 3.5 10/14/2022       Objective:     Vitals: /60   Pulse 75   Temp 98.1 °F (36.7 °C) (Oral)   Resp 11   Ht 6' (1.829 m)   Wt 276 lb 4.8 oz (125.3 kg)   SpO2 95%   BMI 37.47 kg/m² ,    General appearance: Alert, awake and oriented  HEENT: Positive conjunctival pallor  Neck: Supple  Lungs: No gross crackles  Heart: Seems regular rate and rhythm, left chest wall implanted cardiac device  Abdomen: Soft  Extremities: 3+ thigh edema-also leg edema chronic wound      Problem List :         Impression :     CKD stage G4 A1-urine output suboptimal-urine had no active sediment, minimal proteinuria-acceptable natriuresis with loop  Acute decompensated heart failure with predominantly right heart failure with tricuspid regurg and pulmonary hypertension  Underlying atherosclerotic cardiovascular disease, chronic soft tissues, skin and bone infection, anemia, hypertension and other condition  Low magnesium from loop keep the magnesium level close to 2 mg/dL to reduce cardiac dysrhythmia    Recommendation/Plan  :      Accurately watched urine output-if suboptimal urine output such as less than 3 L-either intensify the loop or add thiazide-but watch closely for intravascular volume status, especially with right heart failure-unfortunately we do not have \"silver bullet\"-have to use her clinical judgment with other data  Watch for iatrogenic nosocomial complication, follow clinically and biochemically      Rachel Hammond MD MD

## 2022-10-14 NOTE — CONSULTS
Department of GeneralSurgery   Surgical Service Dr Toan Thorpe   Consult Note    Date of Consult: 10/12/22      Reason for Consult:  bilateral foot wounds  Requesting Physician:  Hospitalist    CHIEF COMPLAINT:  fluid overload with bilateral leg edema    History Obtained From:  patient    HISTORY OF PRESENT ILLNESS:                The patient is a 67 y.o. male who presents with CHF exacerbation and bilateral lower extremity wounds. Patient complains of shortness of breath and increased scrotal swelling. He is known to me from multiple wound care as well as toe amputations in the past.  Patient denies any fever or chills. He is somewhat wheelchair-bound. . Pain is described as dull. Pain level is 2/10. Past Medical History:    Past Medical History:   Diagnosis Date    Acid reflux     Acute MI ([facility] [address] 2004, 2008    Arthritis     Back    Broken teeth     Upper Front    CAD (coronary artery disease)     Sees Dr. Dominga Patterson [facility])     per old chart    CHF (congestive heart failure) ([facility] [address]     Chronic back pain     Chronic kidney disease     STAGE 3 KIDNEY FAILURE- \"from my diabetes- do not follow with any one- have seen Dr Shannon Wakefield in the past\"    Diabetes mellitus ([facility] [address] Dx 1965    per old chart pt has been diabetic since age 13    Diabetic neuropathy ([facility] [address]     \"in my feet\"    H/O cardiovascular stress test 08/25/2016    H/O Doppler ultrasound 09/27/2018    Moderate disease of the right lower extremity with an JALEN of 0.72. Moderate to severe disease of the left lower extremity with an JALEN of 0.55.     H/O percutaneous left heart catheterization 11/20/2018    PATENT STENTS OF ALL THREE MAJOR VESSELS    History of irregular heartbeat     History of syncope     per old chart pt had hx syncope and dizziness for multiple yrs so ICD placed    Hyperlipidemia     Hypertension     Leg swelling     bilat---up to thighs---reduces at times with lying down    Necrotic toes (HCC)     wet gangrene affecting toes of Rt VASCULAR SURGERY      per old chart had balloon angioplasty right superfical femoral artery,right popliteal artery,,right ant.tibial artery, right tibioperoneal trunk, and right post.tibial artery wna stent placement right popliteal artery and superfical femoral artery 7/2012       Current Medications:   No current facility-administered medications for this encounter. Current Outpatient Medications   Medication Sig Dispense Refill    [START ON 10/25/2022] aspirin 81 MG chewable tablet Take 1 tablet by mouth daily 30 tablet 3    [START ON 10/25/2022] isosorbide mononitrate (IMDUR) 30 MG extended release tablet Take 1 tablet by mouth daily 30 tablet 3    hydrALAZINE (APRESOLINE) 25 MG tablet Take 1 tablet by mouth every 8 hours 90 tablet 3    collagenase 250 UNIT/GM ointment Apply topically daily. 30 g 0    magnesium oxide (MAG-OX) 400 (240 Mg) MG tablet Take 1 tablet by mouth 2 times daily 30 tablet 0    oxyCODONE-acetaminophen (PERCOCET) 5-325 MG per tablet Take 1 tablet by mouth 2 times daily as needed for Pain.      torsemide (DEMADEX) 20 MG tablet Take 2 tablets by mouth daily 60 tablet 0    gabapentin (NEURONTIN) 300 MG capsule TAKE 1 CAPSULE BY MOUTH 3 TIMES DAILY FOR 90 DAYS. 90 capsule 3    SPIRIVA HANDIHALER 18 MCG inhalation capsule       atorvastatin (LIPITOR) 40 MG tablet Take 1 tablet by mouth nightly 30 tablet 3    carvedilol (COREG) 3.125 MG tablet Take 1 tablet by mouth 2 times daily 60 tablet 3    albuterol sulfate HFA (VENTOLIN HFA) 108 (90 Base) MCG/ACT inhaler Inhale 2 puffs into the lungs every 4 hours as needed for Wheezing 1 Inhaler 3    clopidogrel (PLAVIX) 75 MG tablet Take 1 tablet by mouth daily 30 tablet 11       Allergies:  Pcn [penicillins] and Fentanyl    Social History:   Social History     Socioeconomic History    Marital status:       Spouse name: None    Number of children: None    Years of education: None    Highest education level: None   Tobacco Use    Smoking status: Former     Packs/day: 0.25     Years: 36.00     Pack years: 9.00     Types: Cigars, Cigarettes     Start date: 1980     Quit date: 10/22/2021     Years since quittin.0    Smokeless tobacco: Never    Tobacco comments:     Client states he has stopped smoking   Vaping Use    Vaping Use: Never used   Substance and Sexual Activity    Alcohol use: No    Drug use: Yes     Types: Marijuana Seabron Barban)    Sexual activity: Never       Family History:   Family History   Problem Relation Age of Onset    Diabetes Mother     Stroke Mother     High Blood Pressure Mother     Vision Loss Mother     Cancer Father         Prostate Cancer    Diabetes Sister     Neuropathy Sister     Other Sister         \"Breathing Problems\"    Heart Disease Sister     Early Death Sister 62        Heart Complications    Cancer Brother         \"Stomach Cancer\"    High Blood Pressure Brother     Diabetes Brother     Heart Disease Brother     High Blood Pressure Brother     Cancer Son         \"Testicle Cancer\"       REVIEW OFSYSTEMS:    Review of Systems   Constitutional:  Negative for chills and fever. HENT:  Negative for ear pain, mouth sores, sore throat and tinnitus. Eyes:  Negative for photophobia, redness and itching. Respiratory:  Positive for shortness of breath. Negative for apnea, choking and stridor. Cardiovascular:  Negative for chest pain and palpitations. Gastrointestinal:  Negative for anal bleeding, constipation and rectal pain. Endocrine: Negative for polydipsia. Genitourinary:  Negative for enuresis, flank pain and hematuria. Musculoskeletal:  Positive for gait problem and joint swelling. Negative for back pain and myalgias. Skin:  Positive for wound. Negative for color change and pallor. Allergic/Immunologic: Negative for environmental allergies. Neurological:  Negative for syncope and speech difficulty. Psychiatric/Behavioral:  Negative for confusion and hallucinations.       PHYSICAL EXAM:  Vitals: 10/24/22 1316 10/24/22 1451 10/24/22 1522 10/24/22 1535   BP:  (!) 161/74 (!) 154/78 (!) 165/94   Pulse:   60 60   Resp: 14  12 11   Temp:   98.1 °F (36.7 °C) 98.2 °F (36.8 °C)   TempSrc:   Oral Oral   SpO2:   94% 98%   Weight:       Height:           Physical Exam  Constitutional:       Appearance: He is well-developed. HENT:      Head: Normocephalic. Eyes:      Pupils: Pupils are equal, round, and reactive to light. Cardiovascular:      Rate and Rhythm: Normal rate. Pulmonary:      Effort: Pulmonary effort is normal.   Abdominal:      General: There is no distension. Palpations: Abdomen is soft. There is no mass. Tenderness: There is no abdominal tenderness. There is no guarding or rebound. Musculoskeletal:         General: Normal range of motion. Cervical back: Normal range of motion and neck supple. Feet:    Skin:     General: Skin is warm. Neurological:      Mental Status: He is alert and oriented to person, place, and time. Media Information  Document Information    Wound Care Image:  Wound   Left great and 2nd toe   10/12/2022 09:50   Attached To: Hospital Encounter on 10/11/22     Source Information    [facility]   Media Information  Document Information    Wound Care Image:  Wound   Rt lat plantar   10/12/2022 09:50   Attached To: Hospital Encounter on 10/11/22     Source Information    [facility]   Media Information  Document Information    Wound Care Image:  Wound   Rt great toe   10/12/2022 09:50   Attached To:    Hospital Encounter on 10/11/22     Source Information    [facility]       DATA:    CBC with Differential:    Lab Results   Component Value Date/Time    WBC 6.1 10/22/2022 12:14 PM    RBC 3.16 10/22/2022 12:14 PM    HGB 8.5 10/22/2022 12:14 PM    HCT 28.5 10/22/2022 12:14 PM     10/22/2022 12:14 PM    MCV 90.2 10/22/2022 12:14 PM    MCH 26.9 10/22/2022 12:14 PM    MCHC 29.8 10/22/2022 12:14 PM    RDW 18.3 10/22/2022 12:14 PM    SEGSPCT 67.7 10/22/2022 12:14 PM    LYMPHOPCT 20.9 10/22/2022 12:14 PM    MONOPCT 9.4 10/22/2022 12:14 PM    EOSPCT 1.5 09/08/2011 06:02 PM    BASOPCT 0.5 10/22/2022 12:14 PM    MONOSABS 0.6 10/22/2022 12:14 PM    LYMPHSABS 1.3 10/22/2022 12:14 PM    EOSABS 0.1 10/22/2022 12:14 PM    BASOSABS 0.0 10/22/2022 12:14 PM    DIFFTYPE AUTOMATED DIFFERENTIAL 10/22/2022 12:14 PM     CMP:    Lab Results   Component Value Date/Time     10/24/2022 12:46 AM    K 4.8 10/24/2022 12:46 AM    K 5.1 01/10/2018 04:32 AM    CL 96 10/24/2022 12:46 AM    CO2 33 10/24/2022 12:46 AM    BUN 71 10/24/2022 12:46 AM    CREATININE 3.8 10/24/2022 12:46 AM    GFRAA 25 10/17/2022 06:50 AM    LABGLOM 16 10/24/2022 12:46 AM    GLUCOSE 217 10/24/2022 12:46 AM    PROT 6.0 10/22/2022 08:10 AM    PROT 6.3 01/21/2013 12:10 PM    LABALBU 2.8 10/24/2022 12:46 AM    CALCIUM 7.5 10/24/2022 12:46 AM    BILITOT 0.7 10/22/2022 08:10 AM    ALKPHOS 66 10/22/2022 08:10 AM    AST 12 10/22/2022 08:10 AM    ALT 8 10/22/2022 08:10 AM       IMPRESSION:        Patient Active Problem List:     PAD (peripheral artery disease) (HCC)     Chronic coronary artery disease     Biventricular ICD (implantable cardioverter-defibrillator) in place     Chronic combined systolic and diastolic heart failure (HCC)     Chronic kidney disease, stage III (moderate) (HCC)     Mixed hyperlipidemia     Sick sinus syndrome (HCC)     Type 2 diabetes mellitus with diabetic polyneuropathy ([facility] [address]     Spinal stenosis of lumbar region     Obesity, Class I, BMI 30-34.9     S/P partial colectomy     Tubulovillous adenoma of colon     Microalbuminuria     WD-PVD (peripheral vascular disease) ([facility])     Limb ischemia     Necrotic toes (HCC)     Toe gangrene (HCC)     Diabetic foot infection ([facility] [address]     Chronic kidney disease (CKD) stage G3a/A2, moderately decreased glomerular filtration rate (GFR) between 45-59 mL/min/1.73 square meter and albuminuria creatinine ratio between  mg/g ([facility])     Edema     ICD (implantable cardioverter-defibrillator) battery depletion     Hyperkalemia     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[facility])     Ischemia of toe     Acute kidney injury ([address]     Fluid overload     DM (diabetes mellitus) ([facility])     Precordial pain     Acute chest pain     Unstable angina ([facility])     Chronic kidney disease (CKD) stage G3a/A3, moderately decreased glomerular filtration rate (GFR) between 45-59 mL/min/1.73 square meter and albuminuria creatinine ratio greater than 300 mg/g ([facility])     Cardiomyopathy ([facility])     Diabetic neuropathy ([facility])     HTN (hypertension)     Epigastric pain     Acute on chronic congestive heart failure ([facility])     Leg edema     Acute on chronic systolic CHF (congestive heart failure) ([facility])     Diabetic foot ulcer with osteomyelitis ([address]     Visit for wound check     Moderate malnutrition ([address]     Long term (current) use of antibiotics     WD-Diabetic ulcer of toe of right foot associated with type 2 diabetes mellitus, with fat layer exposed ([address]     Diabetic ulcer of toe associated with type 2 diabetes mellitus, with bone involvement without evidence of necrosis ([address]     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     Persistent wound pain     Receiving intravenous antibiotic treatment as outpatient     Acute on chronic respiratory failure with hypoxemia ([address]     WD-Chronic foot ulcer, left, with necrosis of bone ([address]     Acute on chronic HFrEF (heart failure with reduced ejection fraction) ([address]     Scrotal edema     Hypertensive urgency     Diabetic ulcer of right foot due to type 2 diabetes mellitus ([address]     Cellulitis of foot     Toe osteomyelitis ([address]     Heart failure exacerbated by sotalol ([facility])     CHF (congestive heart failure), NYHA class I, acute on chronic, combined ([address]        PLAN:    We will continue to monitor the bilateral lower extremity wounds.   The right great toe appears to be worse than the others.         Arlette Del Angel MD

## 2022-10-14 NOTE — PROGRESS NOTES
Pt refused to let this RN do dressing changes on his feet at this time. Pt states it was done earlier today. Current dressings still dry and intact.

## 2022-10-14 NOTE — PROGRESS NOTES
Cardiology Progress Note     Admit Date:  10/11/2022    Consult reason/ Seen today for :       Subjective and  Overnight Events :  leg swelling is improved   Milrinone drip tolerating it well blood pressure well controlled      Chief complain on admission : 67 y. o.year old who is admitted for  Chief Complaint   Patient presents with    Leg Swelling     B/L      Assessment / Plan:  Echo is concerning for right heart failure with dilated right side and RVSP in 80s bilateral lower extremity swelling and scrotal swelling  Continue  milrinone drip increase Lasix to 40 twice daily he is in renal failure probably due to venous renal congestion  CHF: Heart failure with preserved EF abnormal troponin in the setting of abnormal kidney function  Will start carvedilol titrate up stop amlodipine start Entresto although creatinine is elevated can try hydralazine Imdur and Farxiga  HTN: stable, continue present medications  Historically class compliance is also been an issue  Multiple leg wounds ulcers skin tears due to venous congestion wound care as per primary team   severe arterial  disease on Doppler as well he has history of significant peripheral arterial disease, continue  medical management   DVT prophylaxis if no contraindication  6.    Dyslipidemia: continue statins     Past medical history:    has a past medical history of Acid reflux, Acute MI ([address] Arthritis, Broken teeth, CAD (coronary artery disease), Cardiomyopathy ([address] CHF (congestive heart failure) ([address] Chronic back pain, Chronic kidney disease, Diabetes mellitus ([address] Diabetic neuropathy ([address] H/O cardiovascular stress test, H/O Doppler ultrasound, H/O percutaneous left heart catheterization, History of irregular heartbeat, History of syncope, Hyperlipidemia, Hypertension, Leg swelling, Necrotic toes ([address] Neuropathy, neuropathy, PAD (peripheral artery disease) ([address] PVD (peripheral vascular disease) ([facility] [address] Sick sinus syndrome ([facility] [address] Sleep apnea, Spinal stenosis, Teeth missing, Type 2 diabetes mellitus without complication ([facility] [address] and WD-Chronic foot ulcer, left, with necrosis of bone ([facility] [address] Past surgical history:   has a past surgical history that includes Coronary angioplasty with stent; Dental surgery; Colonoscopy (08/04/2016); pacemaker placement (06/04/2010); vascular surgery; colectomy (Right, 08/26/2016); Toe amputation (Right, 09/12/2017); Toe amputation (Right, 01/09/2018); Cardiac catheterization; Cardiac defibrillator placement (06/04/2010); Coronary angioplasty; Cardiac catheterization (07/14/2017); Cardiac catheterization (11/20/2018); Toe amputation (Left, 12/26/2020); IR TUNNELED CVC PLACE WO SQ PORT/PUMP > 5 YEARS (6/14/2021); and Toe amputation (Left, 7/26/2022). Social History:   reports that he quit smoking about a year ago. His smoking use included cigars and cigarettes. He started smoking about 42 years ago. He has a 9.00 pack-year smoking history. He has never used smokeless tobacco. He reports current drug use. Drug: Marijuana Remygokul Stevens). He reports that he does not drink alcohol. Family history:  family history includes Cancer in his brother, father, and son; Diabetes in his brother, mother, and sister; Early Death (age of onset: 62) in his sister; Heart Disease in his brother and sister; High Blood Pressure in his brother, brother, and mother; Neuropathy in his sister; Other in his sister; Stroke in his mother; Vision Loss in his mother.     Allergies   Allergen Reactions    Pcn [Penicillins] Hives    Fentanyl Itching       Review of Systems:    All 14 systems were reviewed and are negative  Except for the positive findings  which as documented     BP (!) 131/46   Pulse 73   Temp 97.8 °F (36.6 °C) (Oral)   Resp 10   Ht 6' (1.829 m)   Wt 276 lb 4.8 oz (125.3 kg)   SpO2 90%   BMI 37.47 kg/m²     Intake/Output Summary (Last 24 hours) at 10/14/2022 1250  Last data filed at 10/14/2022 1206  Gross per 24 hour   Intake 240 ml   Output 900 ml   Net -660 ml     Physical Exam:  Constitutional:  Well developed, Well nourished, No acute distress, Non-toxic appearance. HENT:  Normocephalic, Atraumatic, Bilateral external ears normal, Oropharynx moist, No oral exudates, Nose normal. Neck- Normal range of motion, No tenderness, Supple, No stridor. Eyes:  PERRL, EOMI, Conjunctiva normal, No discharge. Respiratory:  Normal breath sounds, No respiratory distress, No wheezing, No chest tenderness. Cardiovascular:  Normal heart rate, Normal rhythm, No murmurs, No rubs, No gallops, JVP not elevated  Abdomen/GI:  Bowel sounds normal, Soft, No tenderness, No masses, No pulsatile masses. Musculoskeletal:  Intact distal pulses, No edema, No tenderness, No cyanosis, No clubbing. Good range of motion in all major joints. No tenderness to palpation or major deformities noted. Back- No tenderness. Integument:  Warm, Dry, No erythema, No rash. Lymphatic:  No lymphadenopathy noted. Neurologic:  Alert & oriented x 3, Normal motor function, Normal sensory function, No focal deficits noted.    Psychiatric:  Affect  and  Mood :no change    Medications:    magnesium oxide  400 mg Oral BID    furosemide  60 mg IntraVENous BID    lidocaine PF  5 mL IntraDERmal Once    sodium chloride flush  5-40 mL IntraVENous 2 times per day    amLODIPine  10 mg Oral Daily    atorvastatin  40 mg Oral Nightly    carvedilol  3.125 mg Oral BID    clopidogrel  75 mg Oral Daily    gabapentin  300 mg Oral TID    metaxalone  800 mg Oral TID    tiotropium  2 puff Inhalation Daily    sodium chloride flush  5-40 mL IntraVENous 2 times per day    enoxaparin  30 mg SubCUTAneous BID    insulin lispro  0-4 Units SubCUTAneous TID WC    insulin lispro  0-4 Units SubCUTAneous Nightly    collagenase   Topical Daily      milrinone 0.375 mcg/kg/min (10/14/22 0552)    sodium chloride      sodium chloride dextrose       sodium chloride flush, sodium chloride, albuterol sulfate HFA, oxyCODONE-acetaminophen, sodium chloride flush, sodium chloride, acetaminophen, ondansetron **OR** ondansetron, glucose, dextrose bolus **OR** dextrose bolus, glucagon (rDNA), dextrose    Lab Data:  CBC:   Recent Labs     10/11/22  2333 10/13/22  0917   WBC 5.5 5.5   HGB 9.3* 9.1*   HCT 30.4* 30.8*   MCV 88.4 90.9    213     BMP:   Recent Labs     10/12/22  0628 10/13/22  0917 10/14/22  0540    141  141 139   K 4.6 4.6  4.6 4.4    106  107 106   CO2 23 27  26 26   PHOS  --   --  3.5   BUN 33* 35*  34* 36*   CREATININE 2.2* 2.2*  2.2* 2.2*     PT/INR: No results for input(s): PROTIME, INR in the last 72 hours. BNP:    Recent Labs     10/11/22  2333   PROBNP 8,657*     TROPONIN:   Recent Labs     10/11/22  2333 10/12/22  1321   TROPONINT 0.056* 0.074*        ECHO :   echocardiogram    Assessment:  67 y. o.year old who is admitted for  Chief Complaint   Patient presents with    Leg Swelling     B/L    , active issues as noted below:  Impression:  Principal Problem:    Heart failure exacerbated by sotalol ([address]  Active Problems:    Precordial pain    Diabetic ulcer of right foot due to type 2 diabetes mellitus (HCC)    CHF (congestive heart failure), NYHA class I, acute on chronic, combined ([address]    Acute on chronic diastolic CHF (congestive heart failure) ([address]    PAD (peripheral artery disease) ([address]    Biventricular ICD (implantable cardioverter-defibrillator) in place  Resolved Problems:    * No resolved hospital problems. *            All labs, medications and tests reviewed by myself , continue all other medications of all above medical condition listed as is except for changes mentioned above. Thank you very much for consult , please call with questions.     Gilberto Scales MD, MD 10/14/2022 12:50 PM

## 2022-10-14 NOTE — PLAN OF CARE
Problem: Discharge Planning  Goal: Discharge to home or other facility with appropriate resources  10/14/2022 1120 by Rebekah Rose RN  Outcome: Progressing  10/14/2022 0026 by Jimmy Solis RN  Outcome: Progressing     Problem: Pain  Goal: Verbalizes/displays adequate comfort level or baseline comfort level  10/14/2022 1120 by Rebekah Rose RN  Outcome: Progressing  10/14/2022 0026 by Jimmy Solis RN  Outcome: Progressing     Problem: Skin/Tissue Integrity  Goal: Absence of new skin breakdown  Description: 1. Monitor for areas of redness and/or skin breakdown  2. Assess vascular access sites hourly  3. Every 4-6 hours minimum:  Change oxygen saturation probe site  4. Every 4-6 hours:  If on nasal continuous positive airway pressure, respiratory therapy assess nares and determine need for appliance change or resting period.   10/14/2022 1120 by Rebekah Rose RN  Outcome: Progressing  10/14/2022 0026 by Jimmy Solis RN  Outcome: Progressing     Problem: Safety - Adult  Goal: Free from fall injury  10/14/2022 1120 by Rebekah Rose RN  Outcome: Progressing  10/14/2022 0026 by Jimmy Solis RN  Outcome: Progressing     Problem: Chronic Conditions and Co-morbidities  Goal: Patient's chronic conditions and co-morbidity symptoms are monitored and maintained or improved  10/14/2022 1120 by Rebekah Rose RN  Outcome: Progressing  10/14/2022 0026 by Jimmy Solis RN  Outcome: Progressing

## 2022-10-15 LAB
ALBUMIN SERPL-MCNC: 2.5 GM/DL (ref 3.4–5)
ALP BLD-CCNC: 73 IU/L (ref 40–128)
ALT SERPL-CCNC: 5 U/L (ref 10–40)
ANION GAP SERPL CALCULATED.3IONS-SCNC: 8 MMOL/L (ref 4–16)
AST SERPL-CCNC: 11 IU/L (ref 15–37)
BILIRUB SERPL-MCNC: 1.1 MG/DL (ref 0–1)
BUN BLDV-MCNC: 39 MG/DL (ref 6–23)
CALCIUM SERPL-MCNC: 7.8 MG/DL (ref 8.3–10.6)
CHLORIDE BLD-SCNC: 104 MMOL/L (ref 99–110)
CO2: 27 MMOL/L (ref 21–32)
CREAT SERPL-MCNC: 2.4 MG/DL (ref 0.9–1.3)
GFR AFRICAN AMERICAN: 32 ML/MIN/1.73M2
GFR NON-AFRICAN AMERICAN: 27 ML/MIN/1.73M2
GLUCOSE BLD-MCNC: 172 MG/DL (ref 70–99)
GLUCOSE BLD-MCNC: 197 MG/DL (ref 70–99)
GLUCOSE BLD-MCNC: 202 MG/DL (ref 70–99)
GLUCOSE BLD-MCNC: 203 MG/DL (ref 70–99)
GLUCOSE BLD-MCNC: 216 MG/DL (ref 70–99)
POTASSIUM SERPL-SCNC: 4.4 MMOL/L (ref 3.5–5.1)
SODIUM BLD-SCNC: 139 MMOL/L (ref 135–145)
TOTAL PROTEIN: 5.7 GM/DL (ref 6.4–8.2)

## 2022-10-15 PROCEDURE — 94640 AIRWAY INHALATION TREATMENT: CPT

## 2022-10-15 PROCEDURE — 6360000002 HC RX W HCPCS: Performed by: INTERNAL MEDICINE

## 2022-10-15 PROCEDURE — APPSS15 APP SPLIT SHARED TIME 0-15 MINUTES: Performed by: NURSE PRACTITIONER

## 2022-10-15 PROCEDURE — 6370000000 HC RX 637 (ALT 250 FOR IP)

## 2022-10-15 PROCEDURE — 2700000000 HC OXYGEN THERAPY PER DAY

## 2022-10-15 PROCEDURE — 6370000000 HC RX 637 (ALT 250 FOR IP): Performed by: INTERNAL MEDICINE

## 2022-10-15 PROCEDURE — 36415 COLL VENOUS BLD VENIPUNCTURE: CPT

## 2022-10-15 PROCEDURE — 99233 SBSQ HOSP IP/OBS HIGH 50: CPT | Performed by: INTERNAL MEDICINE

## 2022-10-15 PROCEDURE — 6370000000 HC RX 637 (ALT 250 FOR IP): Performed by: STUDENT IN AN ORGANIZED HEALTH CARE EDUCATION/TRAINING PROGRAM

## 2022-10-15 PROCEDURE — 94761 N-INVAS EAR/PLS OXIMETRY MLT: CPT

## 2022-10-15 PROCEDURE — 2580000003 HC RX 258: Performed by: INTERNAL MEDICINE

## 2022-10-15 PROCEDURE — 82962 GLUCOSE BLOOD TEST: CPT

## 2022-10-15 PROCEDURE — 80053 COMPREHEN METABOLIC PANEL: CPT

## 2022-10-15 PROCEDURE — 2580000003 HC RX 258: Performed by: STUDENT IN AN ORGANIZED HEALTH CARE EDUCATION/TRAINING PROGRAM

## 2022-10-15 PROCEDURE — 1200000000 HC SEMI PRIVATE

## 2022-10-15 PROCEDURE — 6360000002 HC RX W HCPCS: Performed by: STUDENT IN AN ORGANIZED HEALTH CARE EDUCATION/TRAINING PROGRAM

## 2022-10-15 RX ORDER — FUROSEMIDE 10 MG/ML
40 INJECTION INTRAMUSCULAR; INTRAVENOUS 2 TIMES DAILY
Status: DISCONTINUED | OUTPATIENT
Start: 2022-10-15 | End: 2022-10-16

## 2022-10-15 RX ADMIN — GABAPENTIN 300 MG: 300 CAPSULE ORAL at 08:29

## 2022-10-15 RX ADMIN — GABAPENTIN 300 MG: 300 CAPSULE ORAL at 14:01

## 2022-10-15 RX ADMIN — HYDRALAZINE HYDROCHLORIDE 25 MG: 25 TABLET ORAL at 14:01

## 2022-10-15 RX ADMIN — OXYCODONE AND ACETAMINOPHEN 1 TABLET: 5; 325 TABLET ORAL at 20:53

## 2022-10-15 RX ADMIN — CLOPIDOGREL BISULFATE 75 MG: 75 TABLET ORAL at 08:30

## 2022-10-15 RX ADMIN — CARVEDILOL 6.25 MG: 6.25 TABLET, FILM COATED ORAL at 08:29

## 2022-10-15 RX ADMIN — ENOXAPARIN SODIUM 30 MG: 100 INJECTION SUBCUTANEOUS at 08:30

## 2022-10-15 RX ADMIN — TIOTROPIUM BROMIDE INHALATION SPRAY 2 PUFF: 3.12 SPRAY, METERED RESPIRATORY (INHALATION) at 07:45

## 2022-10-15 RX ADMIN — HYDRALAZINE HYDROCHLORIDE 25 MG: 25 TABLET ORAL at 06:49

## 2022-10-15 RX ADMIN — CARVEDILOL 6.25 MG: 6.25 TABLET, FILM COATED ORAL at 20:53

## 2022-10-15 RX ADMIN — OXYCODONE AND ACETAMINOPHEN 1 TABLET: 5; 325 TABLET ORAL at 08:29

## 2022-10-15 RX ADMIN — MILRINONE LACTATE 0.38 MCG/KG/MIN: 200 INJECTION, SOLUTION INTRAVENOUS at 13:57

## 2022-10-15 RX ADMIN — GABAPENTIN 300 MG: 300 CAPSULE ORAL at 20:53

## 2022-10-15 RX ADMIN — FUROSEMIDE 60 MG: 10 INJECTION, SOLUTION INTRAVENOUS at 08:30

## 2022-10-15 RX ADMIN — ATORVASTATIN CALCIUM 40 MG: 40 TABLET, FILM COATED ORAL at 20:54

## 2022-10-15 RX ADMIN — ISOSORBIDE MONONITRATE 30 MG: 30 TABLET, EXTENDED RELEASE ORAL at 08:29

## 2022-10-15 RX ADMIN — SODIUM CHLORIDE, PRESERVATIVE FREE 10 ML: 5 INJECTION INTRAVENOUS at 08:30

## 2022-10-15 RX ADMIN — SODIUM CHLORIDE, PRESERVATIVE FREE 10 ML: 5 INJECTION INTRAVENOUS at 08:31

## 2022-10-15 RX ADMIN — COLLAGENASE SANTYL: 250 OINTMENT TOPICAL at 18:12

## 2022-10-15 RX ADMIN — Medication 400 MG: at 08:29

## 2022-10-15 RX ADMIN — INSULIN LISPRO 1 UNITS: 100 INJECTION, SOLUTION INTRAVENOUS; SUBCUTANEOUS at 08:41

## 2022-10-15 RX ADMIN — HYDRALAZINE HYDROCHLORIDE 25 MG: 25 TABLET ORAL at 20:54

## 2022-10-15 RX ADMIN — ENOXAPARIN SODIUM 30 MG: 100 INJECTION SUBCUTANEOUS at 20:54

## 2022-10-15 RX ADMIN — FUROSEMIDE 40 MG: 10 INJECTION, SOLUTION INTRAMUSCULAR; INTRAVENOUS at 16:54

## 2022-10-15 RX ADMIN — Medication 400 MG: at 20:53

## 2022-10-15 ASSESSMENT — PAIN DESCRIPTION - LOCATION: LOCATION: BACK

## 2022-10-15 ASSESSMENT — PAIN SCALES - GENERAL
PAINLEVEL_OUTOF10: 0
PAINLEVEL_OUTOF10: 6

## 2022-10-15 NOTE — PROGRESS NOTES
V2.0  [facility] [place]      Name:  Ioana Kline /Age/Sex: 1950  (67 y.o. male)   MRN & CSN:  [id number] & [id number] Encounter Date/Time: 10/15/2022 9:02 AM EDT    Location:  [address] [place] [address]-A PCP: Kameron Isaac Day: 5    Assessment and Plan:   Ioana Kline is a 67 y.o. male with pmh of CAD, CHF, stage III, T2DM who presents with shortness of breath, scrotal swelling, who presents with Heart failure. Plan:  Right-sided heart failure with preserved EF  Abnormal troponin in the setting of abnormal kidney function  - Bilateral lower extremity swelling improving  - Scrotal/penile swelling, improving  -On admission BNP 8600, patient received 40 mg IV Lasix in ED,   - 2D echo completed, echo report shows grade 2 diastolic dysfunction, with normal LV function with an EF of 50-55%  --Remote interrogation of ICD report shows that it is consistent with normal dual-chamber MRI safe [employer] function with stable leads and appropriate battery status related to the device. -- Appreciate cardiology input: With following recommendations: \"Continue Entresto can try hydralazine, Imdur and Farxiga\" Lasix twice daily, and continue milrinone drip  --Continue to monitor renal function daily  --Doppler shows severe arterial disease in the setting of significant peripheral artery disease  - Monitor telemetry  - PT/OT evaluation, appreciate recommendations  - Lab work in a.m.  - Patient wears 3 L/NC of O2 at home     Acute on chronic Kidney disease   --Creatinine 2.4  -- Appreciate nephrology input with following recommendations:  \"Accurately watched urine output if supplemental urine output such as less than 3 L-either intensify loop or add thiazide, but watch closely for intravascular volume status, especially with right heart failure-unfortunately we do not have \"silver bullet \"have to use our clinical judgment with other data watch for iatrogenic nosocomial complication, follow clinically and biochemically\"   \"lower Lasix 40 IV twice daily, watch urine output, adequate bladder training, watch for iatrogenic nosocomial complication, clinically and biochemically\"     Multiple wounds to right/left foot/toes/scrotum  - General surgery following  - Wound care following, cultures ordered/sent  - Dressing change per wound care recommendations        Hypertension  --Continue on home meds, lisinopril on hold at this time     CAD  AICD  - Continue Plavix, statin, and beta-blocker  - CXR shows vascular congestion     Mixed hyperlipidemia  - Continue statin     Chronically elevated troponin  - Troponin on admission 0.056  - Denies chest pain     COPD  Chronic hypoxic respiratory failure  - Does not appear to be in exacerbation  - 3 L nasal cannula home oxygen, at baseline  - Continue home medication regimen     T2DM  --Hemoglobin A1c 7.1  -Sliding scale insulin  - Hypoglycemia protocol  - POCT before meals and at bedtime    Diet ADULT DIET; Regular; Low Sodium (2 gm)   DVT Prophylaxis [x] Lovenox, []  Heparin, [] SCDs, [] Ambulation,  [] Eliquis, [] Xarelto  [] Coumadin   Code Status Full Code   Disposition From: Home  Expected Disposition: Home  Estimated Date of Discharge: 2 days  Patient requires continued admission due to ongoing cardiac, and renal work-up   Surrogate Decision Maker/ POA Self     Subjective:     Chief Complaint: Leg Swelling (B/L)  Patient seen at bedside this AM, declines chest pain, tells me that he is more tired this a.m., endorses did not sleep well last night. Patient reports scrotal swelling is better, and reports pain to scrotal area is better. Review of Systems:    Review of Systems    Cardiovascular:  Positive for leg swelling. Genitourinary:  Positive for scrotal swelling. Improving  Skin:  Positive for wound.         Right great toe; right lateral plantar open wound  Left great and second toe open wound  Scrotum wound  Right ischium wound   All other systems reviewed and are negative. Intake/Output Summary (Last 24 hours) at 10/15/2022 0902  Last data filed at 10/15/2022 6272  Gross per 24 hour   Intake 480 ml   Output 1975 ml   Net -1495 ml        Vitals:   Vitals:    10/15/22 0830   BP: (!) 147/65   Pulse: 85   Resp: 15   Temp: 98.3 °F (36.8 °C)   SpO2:        Physical Exam:     General: NAD, cooperative, interactive with care, sleepy  Eyes: EOMI,   ENT: neck supple  Cardiovascular: Regular rate, ICD  Respiratory: Diminished  Gastrointestinal: Soft, non tender  Genitourinary: no suprapubic tenderness  Musculoskeletal: Lateral lower extremity edema, nonpitting  Skin: warm, dry, open wounds to Right great toe; right lateral plantar open wound; Left great and second toe;open wound  Scrotum wound' Right ischium wound   Neuro: Alert. oriented x 3  Psych: Mood appropriate.    Medications:   Medications:    magnesium oxide  400 mg Oral BID    carvedilol  6.25 mg Oral BID    hydrALAZINE  25 mg Oral 3 times per day    isosorbide mononitrate  30 mg Oral Daily    furosemide  60 mg IntraVENous BID    lidocaine PF  5 mL IntraDERmal Once    sodium chloride flush  5-40 mL IntraVENous 2 times per day    atorvastatin  40 mg Oral Nightly    clopidogrel  75 mg Oral Daily    gabapentin  300 mg Oral TID    metaxalone  800 mg Oral TID    tiotropium  2 puff Inhalation Daily    sodium chloride flush  5-40 mL IntraVENous 2 times per day    enoxaparin  30 mg SubCUTAneous BID    insulin lispro  0-4 Units SubCUTAneous TID WC    insulin lispro  0-4 Units SubCUTAneous Nightly    collagenase   Topical Daily      Infusions:    milrinone 0.375 mcg/kg/min (10/14/22 2128)    sodium chloride      sodium chloride      dextrose       PRN Meds: sodium chloride flush, 5-40 mL, PRN  sodium chloride, , PRN  albuterol sulfate HFA, 2 puff, Q4H PRN  oxyCODONE-acetaminophen, 1 tablet, BID PRN  sodium chloride flush, 5-40 mL, PRN  sodium chloride, , PRN  acetaminophen, 650 mg, Q4H PRN  ondansetron, 4 mg, Q8H PRN   Or  ondansetron, 4 mg, Q6H PRN  glucose, 4 tablet, PRN  dextrose bolus, 125 mL, PRN   Or  dextrose bolus, 250 mL, PRN  glucagon (rDNA), 1 mg, PRN  dextrose, , Continuous PRN        Labs      Recent Results (from the past 24 hour(s))   POCT Glucose    Collection Time: 10/14/22 12:03 PM   Result Value Ref Range    POC Glucose 253 (H) 70 - 99 MG/DL   POCT Glucose    Collection Time: 10/14/22  5:26 PM   Result Value Ref Range    POC Glucose 153 (H) 70 - 99 MG/DL   POCT Glucose    Collection Time: 10/14/22  8:47 PM   Result Value Ref Range    POC Glucose 203 (H) 70 - 99 MG/DL   Comprehensive Metabolic Panel    Collection Time: 10/15/22 12:52 AM   Result Value Ref Range    Sodium 139 135 - 145 MMOL/L    Potassium 4.4 3.5 - 5.1 MMOL/L    Chloride 104 99 - 110 mMol/L    CO2 27 21 - 32 MMOL/L    BUN 39 (H) 6 - 23 MG/DL    Creatinine 2.4 (H) 0.9 - 1.3 MG/DL    Glucose 172 (H) 70 - 99 MG/DL    Calcium 7.8 (L) 8.3 - 10.6 MG/DL    Albumin 2.5 (L) 3.4 - 5.0 GM/DL    Total Protein 5.7 (L) 6.4 - 8.2 GM/DL    Total Bilirubin 1.1 (H) 0.0 - 1.0 MG/DL    ALT 5 (L) 10 - 40 U/L    AST 11 (L) 15 - 37 IU/L    Alkaline Phosphatase 73 40 - 128 IU/L    GFR Non- 27 (L) >60 mL/min/1.73m2    GFR  32 (L) >60 mL/min/1.73m2    Anion Gap 8 4 - 16   POCT Glucose    Collection Time: 10/15/22  8:15 AM   Result Value Ref Range    POC Glucose 202 (H) 70 - 99 MG/DL        Imaging/Diagnostics Last 24 Hours   VL DUP LOWER EXTREMITY ARTERIES BILATERAL    Result Date: 10/14/2022  EXAMINATION: ARTERIAL DUPLEX ULTRASOUND OF THE BILATERAL LOWER EXTREMITIES  10/13/2022 8:49 pm TECHNIQUE: Duplex ultrasound using B-mode/gray scaled imaging, Doppler spectral analysis and color flow Doppler was obtained of the bilateral lower extremities. COMPARISON: None.  HISTORY: ORDERING SYSTEM PROVIDED HISTORY: PVD/ non healing ulcers TECHNOLOGIST PROVIDED HISTORY: Reason for exam:->PVD/ non healing ulcers FINDINGS: Exam was extremely difficult due to lack of cooperation with the patient and extreme skin thickness and hardening in the bilateral calves. There is edema in the bilateral lower extremities. There is atherosclerosis in the visualized arteries and there is an enlarged left groin lymph node measuring 1.4 x 2.4 x 2.7 cm in size. These are not significantly changed when compared to prior CT abdomen pelvis from 01/24/2022. There are grossly triphasic waveforms in the right leg from the common femoral artery to the popliteal artery and there are monophasic waveforms below the knee in the anterior tibial and posterior tibial arteries. There are grossly triphasic waveforms in the left common femoral and superficial femoral arteries. There are grossly monophasic waveforms in the popliteal artery and proximal anterior tibial and peroneal arteries which are not visualized distally. Flow velocities were measured as follows: Com. Fem. 175 cm/sec Prof.           143 cm/sec SFA Prox. 179 cm/sec SFA Mid.     105 cm/sec SFA Dist.     73 cm/sec Pop.           72 cm/sec PTA           68 cm/sec Peron. 25 cm/sec TE           not visualized cm/sec Left: Flow velocities were measured as follows: Com. Fem. 61 cm/sec Prof.           118 cm/sec SFA Prox. 70 cm/sec SFA Mid.     49 cm/sec SFA Dist.     75 cm/sec Pop.           33 cm/sec PTA           41 proximally cm/sec Peron. 41 proximally cm/sec TE           not visualized cm/sec     Abnormal waveforms in the left leg as described without visualization of the peroneal artery or distal anterior posterior tibial arteries suspicious for severe peripheral vascular disease. Conventional arteriography is recommended for further evaluation. Tardus parvus waveforms in the right leg below the knee in the anterior and posterior tibial arteries with nonvisualization of the peroneal artery.        Electronically signed by MICKY Hood CNP on 10/15/2022 at 9:02 AM

## 2022-10-15 NOTE — PROGRESS NOTES
Cardiology Progress Note     Admit Date:  10/11/2022    Today's Plan: continue milrinone drip. Continue current medical management    Consult reason/ Seen today for :       Subjective and  Overnight Events :      Chief complain on admission : 67 y. o.year old who is admitted for  Chief Complaint   Patient presents with    Leg Swelling     B/L      Assessment / Plan:  Echo is concerning for right heart failure with dilated right side and RVSP in 80s bilateral lower extremity swelling and scrotal swelling  Continue  milrinone drip increase Lasix to 40 twice daily he is in renal failure probably due to venous renal congestion  CHF: Heart failure with preserved EF abnormal troponin in the setting of abnormal kidney function  Continue carvedilol . Continue Entresto although creatinine is elevated can try hydralazine Imdur and Farxiga  HTN: stable, continue present medications  Historically compliance is also been an issue  Multiple leg wounds ulcers skin tears due to venous congestion wound care as per primary team   severe arterial  disease on Doppler as well he has history of significant peripheral arterial disease, continue  medical management   DVT prophylaxis if no contraindication  6.    Dyslipidemia: continue statins     Past medical history:    has a past medical history of Acid reflux, Acute MI ([address] Arthritis, Broken teeth, CAD (coronary artery disease), Cardiomyopathy ([address] CHF (congestive heart failure) ([address] Chronic back pain, Chronic kidney disease, Diabetes mellitus ([address] Diabetic neuropathy ([address] H/O cardiovascular stress test, H/O Doppler ultrasound, H/O percutaneous left heart catheterization, History of irregular heartbeat, History of syncope, Hyperlipidemia, Hypertension, Leg swelling, Necrotic toes (HCC), Neuropathy, neuropathy, PAD (peripheral artery disease) ([address] PVD (peripheral vascular disease) ([address] Sick sinus syndrome [facility]), Sleep apnea, Spinal stenosis, Teeth missing, Type 2 diabetes mellitus without complication ([facility] and WD-Chronic foot ulcer, left, with necrosis of bone ([facility] [address] Past surgical history:   has a past surgical history that includes Coronary angioplasty with stent; Dental surgery; Colonoscopy (08/04/2016); pacemaker placement (06/04/2010); vascular surgery; colectomy (Right, 08/26/2016); Toe amputation (Right, 09/12/2017); Toe amputation (Right, 01/09/2018); Cardiac catheterization; Cardiac defibrillator placement (06/04/2010); Coronary angioplasty; Cardiac catheterization (07/14/2017); Cardiac catheterization (11/20/2018); Toe amputation (Left, 12/26/2020); IR TUNNELED CVC PLACE WO SQ PORT/PUMP > 5 YEARS (6/14/2021); and Toe amputation (Left, 7/26/2022). Social History:   reports that he quit smoking about a year ago. His smoking use included cigars and cigarettes. He started smoking about 42 years ago. He has a 9.00 pack-year smoking history. He has never used smokeless tobacco. He reports current drug use. Drug: Marijuana Donald Shoemaker). He reports that he does not drink alcohol. Family history:  family history includes Cancer in his brother, father, and son; Diabetes in his brother, mother, and sister; Early Death (age of onset: 62) in his sister; Heart Disease in his brother and sister; High Blood Pressure in his brother, brother, and mother; Neuropathy in his sister; Other in his sister; Stroke in his mother; Vision Loss in his mother.     Allergies   Allergen Reactions    Pcn [Penicillins] Hives    Fentanyl Itching       Review of Systems:    All 14 systems were reviewed and are negative  Except for the positive findings  which as documented     BP (!) 147/65   Pulse 85   Temp 98.3 °F (36.8 °C) (Oral)   Resp 15   Ht 6' (1.829 m)   Wt 272 lb (123.4 kg)   SpO2 92%   BMI 36.89 kg/m²     Intake/Output Summary (Last 24 hours) at 10/15/2022 1209  Last data filed at 10/15/2022 0646  Gross per 24 hour Intake 240 ml   Output 1075 ml   Net -835 ml     Physical Exam:  Constitutional:  Well developed, Well nourished, No acute distress, Non-toxic appearance. Dressings noted to bilateral lower legs. Legs are dry and scaly  HENT:  Normocephalic, Atraumatic, Bilateral external ears normal, Nose normal. Neck- Normal range of motion, No tenderness,   Eyes:  PERRL, Conjunctiva normal, No discharge. Respiratory:  Normal breath sounds, No respiratory distress, No wheezing, No chest tenderness. Cardiovascular:  Normal heart rate, Normal rhythm, No murmurs, No rubs, No gallops, JVP not elevated  Abdomen/GI:  Bowel sounds normal, Soft, No tenderness, No masses, No pulsatile masses. Musculoskeletal:  Intact distal pulses, No edema, No tenderness, No cyanosis, No clubbing. Integument:  Warm, Dry, No erythema, No rash. Lymphatic:  No lymphadenopathy noted. Neurologic:  Alert & oriented x 3, Normal motor function, Normal sensory function, No focal deficits noted.    Psychiatric:  Affect  and  Mood :no change    Medications:    magnesium oxide  400 mg Oral BID    carvedilol  6.25 mg Oral BID    hydrALAZINE  25 mg Oral 3 times per day    isosorbide mononitrate  30 mg Oral Daily    furosemide  60 mg IntraVENous BID    lidocaine PF  5 mL IntraDERmal Once    sodium chloride flush  5-40 mL IntraVENous 2 times per day    atorvastatin  40 mg Oral Nightly    clopidogrel  75 mg Oral Daily    gabapentin  300 mg Oral TID    metaxalone  800 mg Oral TID    tiotropium  2 puff Inhalation Daily    sodium chloride flush  5-40 mL IntraVENous 2 times per day    enoxaparin  30 mg SubCUTAneous BID    insulin lispro  0-4 Units SubCUTAneous TID     insulin lispro  0-4 Units SubCUTAneous Nightly    collagenase   Topical Daily      milrinone 0.375 mcg/kg/min (10/14/22 2128)    sodium chloride      sodium chloride      dextrose       sodium chloride flush, sodium chloride, albuterol sulfate HFA, oxyCODONE-acetaminophen, sodium chloride flush, sodium chloride, acetaminophen, ondansetron **OR** ondansetron, glucose, dextrose bolus **OR** dextrose bolus, glucagon (rDNA), dextrose    Lab Data:  CBC:   Recent Labs     10/13/22  0917   WBC 5.5   HGB 9.1*   HCT 30.8*   MCV 90.9        BMP:   Recent Labs     10/13/22  0917 10/14/22  0540 10/15/22  0052     141 139 139   K 4.6  4.6 4.4 4.4     107 106 104   CO2 27  26 26 27   PHOS  --  3.5  --    BUN 35*  34* 36* 39*   CREATININE 2.2*  2.2* 2.2* 2.4*     PT/INR: No results for input(s): PROTIME, INR in the last 72 hours. BNP:    No results for input(s): PROBNP in the last 72 hours. TROPONIN:   Recent Labs     10/12/22  1321   TROPONINT 0.074*        ECHO :      Summary   Left ventricular systolic function is normal.   Ejection fraction is visually estimated at 50-55%. Mild left ventricular hypertrophy. Grade II diastolic dysfunction. PPM wiring visualized in right heart. Mildly dilated right atrium. Severely dilated right ventricle. Severe tricuspid regurgitation; RVSP: 80 mmHg. No evidence of any pericardial effusion. Right pleural effusion. Inferior vena cava is dilated, measuring at 2.6 cm, and does not collapse   with respiration. Dilated aortic root (4.4 cm). All labs, medications and tests reviewed by myself , continue all other medications of all above medical condition listed as is except for changes mentioned above. Thank you very much for consult , please call with questions.     MICKY Ochoa - CNP, 10/15/2022 12:09 PM

## 2022-10-15 NOTE — PROGRESS NOTES
Nephrology Progress Note  10/15/2022 12:07 PM        Subjective:   Admit Date: 10/11/2022  PCP: Fredi Mitchell    Interval History: Seen earlier today, had bladder obstruction, feels better after urine evacuation    Diet: Reasonable    ROS: No overt shortness of breath  Urine output recorded about 2 L for the last 24 hours  No fever, acceptable blood pressure    Data:     Current meds:    magnesium oxide  400 mg Oral BID    carvedilol  6.25 mg Oral BID    hydrALAZINE  25 mg Oral 3 times per day    isosorbide mononitrate  30 mg Oral Daily    furosemide  60 mg IntraVENous BID    lidocaine PF  5 mL IntraDERmal Once    sodium chloride flush  5-40 mL IntraVENous 2 times per day    atorvastatin  40 mg Oral Nightly    clopidogrel  75 mg Oral Daily    gabapentin  300 mg Oral TID    metaxalone  800 mg Oral TID    tiotropium  2 puff Inhalation Daily    sodium chloride flush  5-40 mL IntraVENous 2 times per day    enoxaparin  30 mg SubCUTAneous BID    insulin lispro  0-4 Units SubCUTAneous TID WC    insulin lispro  0-4 Units SubCUTAneous Nightly    collagenase   Topical Daily      milrinone 0.375 mcg/kg/min (10/14/22 2128)    sodium chloride      sodium chloride      dextrose           I/O last 3 completed shifts:   In: 480 [P.O.:480]  Out: 1975 [EITVX:[id number]]    CBC:   Recent Labs     10/13/22  0917   WBC 5.5   HGB 9.1*             Recent Labs     10/13/22  0917 10/14/22  0540 10/15/22  0052     141 139 139   K 4.6  4.6 4.4 4.4     107 106 104   CO2 27  26 26 27   BUN 35*  34* 36* 39*   CREATININE 2.2*  2.2* 2.2* 2.4*   GLUCOSE 132*  132* 175* 172*       Lab Results   Component Value Date    CALCIUM 7.8 (L) 10/15/2022    PHOS 3.5 10/14/2022       Objective:     Vitals: BP (!) 147/65   Pulse 85   Temp 98.3 °F (36.8 °C) (Oral)   Resp 15   Ht 6' (1.829 m)   Wt 272 lb (123.4 kg)   SpO2 92%   BMI 36.89 kg/m² ,    General appearance: Alert, awake and oriented  HEENT: Positive conjunctival pallor  Neck: Supple  Lungs: No gross crackles  Heart: Seems regular rate and rhythm, chest wall implanted cardiac device  Abdomen: Soft  Extremities: Chronic edema  Also chronic leg wound      Problem List :         Impression :     CKD stage G4 A1-adjust diuretics  Acute decompensated heart failure-he has more right heart failure than the left-also had tricuspid regurgitation and pulmonary hypertension with significant leg edema  Underlying atherosclerotic cardiovascular disease and chronic skin soft tissue and perhaps bone infection  Other chronic condition and acute bladder obstruction    Recommendation/Plan  :     Lower lasix 40 IV bid   Watch UOP   Adequate bladder drain  Watch for iatrogenic nosocomial complication  Clinically and biochemically      Sugar Penaloza MD MD

## 2022-10-15 NOTE — PROGRESS NOTES
Pt retaining urine at 363ml from bladder scan. C/o of abdomen pain upon touch. Magalys NP notified with verbal order to straight cath. Will follow up with next shift.

## 2022-10-16 ENCOUNTER — APPOINTMENT (OUTPATIENT)
Dept: GENERAL RADIOLOGY | Age: 72
DRG: 252 | End: 2022-10-16
Payer: MEDICARE

## 2022-10-16 LAB
GLUCOSE BLD-MCNC: 199 MG/DL (ref 70–99)
GLUCOSE BLD-MCNC: 212 MG/DL (ref 70–99)
GLUCOSE BLD-MCNC: 225 MG/DL (ref 70–99)
GLUCOSE BLD-MCNC: 273 MG/DL (ref 70–99)

## 2022-10-16 PROCEDURE — 99233 SBSQ HOSP IP/OBS HIGH 50: CPT | Performed by: INTERNAL MEDICINE

## 2022-10-16 PROCEDURE — 6370000000 HC RX 637 (ALT 250 FOR IP): Performed by: INTERNAL MEDICINE

## 2022-10-16 PROCEDURE — 94640 AIRWAY INHALATION TREATMENT: CPT

## 2022-10-16 PROCEDURE — 6370000000 HC RX 637 (ALT 250 FOR IP): Performed by: STUDENT IN AN ORGANIZED HEALTH CARE EDUCATION/TRAINING PROGRAM

## 2022-10-16 PROCEDURE — 6360000002 HC RX W HCPCS: Performed by: INTERNAL MEDICINE

## 2022-10-16 PROCEDURE — 80053 COMPREHEN METABOLIC PANEL: CPT

## 2022-10-16 PROCEDURE — 71045 X-RAY EXAM CHEST 1 VIEW: CPT

## 2022-10-16 PROCEDURE — 82962 GLUCOSE BLOOD TEST: CPT

## 2022-10-16 PROCEDURE — 1200000000 HC SEMI PRIVATE

## 2022-10-16 PROCEDURE — 51701 INSERT BLADDER CATHETER: CPT

## 2022-10-16 PROCEDURE — 94761 N-INVAS EAR/PLS OXIMETRY MLT: CPT

## 2022-10-16 PROCEDURE — 6360000002 HC RX W HCPCS: Performed by: STUDENT IN AN ORGANIZED HEALTH CARE EDUCATION/TRAINING PROGRAM

## 2022-10-16 PROCEDURE — 51798 US URINE CAPACITY MEASURE: CPT

## 2022-10-16 PROCEDURE — 94664 DEMO&/EVAL PT USE INHALER: CPT

## 2022-10-16 RX ADMIN — ENOXAPARIN SODIUM 30 MG: 100 INJECTION SUBCUTANEOUS at 09:20

## 2022-10-16 RX ADMIN — CLOPIDOGREL BISULFATE 75 MG: 75 TABLET ORAL at 09:19

## 2022-10-16 RX ADMIN — GABAPENTIN 300 MG: 300 CAPSULE ORAL at 16:01

## 2022-10-16 RX ADMIN — GABAPENTIN 300 MG: 300 CAPSULE ORAL at 09:21

## 2022-10-16 RX ADMIN — ISOSORBIDE MONONITRATE 30 MG: 30 TABLET, EXTENDED RELEASE ORAL at 09:20

## 2022-10-16 RX ADMIN — CARVEDILOL 6.25 MG: 6.25 TABLET, FILM COATED ORAL at 09:20

## 2022-10-16 RX ADMIN — MILRINONE LACTATE 0.38 MCG/KG/MIN: 200 INJECTION, SOLUTION INTRAVENOUS at 05:10

## 2022-10-16 RX ADMIN — COLLAGENASE SANTYL: 250 OINTMENT TOPICAL at 16:06

## 2022-10-16 RX ADMIN — FUROSEMIDE 40 MG: 10 INJECTION, SOLUTION INTRAMUSCULAR; INTRAVENOUS at 09:20

## 2022-10-16 RX ADMIN — TIOTROPIUM BROMIDE INHALATION SPRAY 2 PUFF: 3.12 SPRAY, METERED RESPIRATORY (INHALATION) at 08:48

## 2022-10-16 RX ADMIN — Medication 400 MG: at 09:20

## 2022-10-16 RX ADMIN — OXYCODONE AND ACETAMINOPHEN 1 TABLET: 5; 325 TABLET ORAL at 09:28

## 2022-10-16 RX ADMIN — INSULIN LISPRO 2 UNITS: 100 INJECTION, SOLUTION INTRAVENOUS; SUBCUTANEOUS at 16:14

## 2022-10-16 RX ADMIN — HYDRALAZINE HYDROCHLORIDE 25 MG: 25 TABLET ORAL at 16:01

## 2022-10-16 ASSESSMENT — ENCOUNTER SYMPTOMS
RESPIRATORY NEGATIVE: 1
ALLERGIC/IMMUNOLOGIC NEGATIVE: 1
EYES NEGATIVE: 1
GASTROINTESTINAL NEGATIVE: 1

## 2022-10-16 ASSESSMENT — PAIN SCALES - GENERAL: PAINLEVEL_OUTOF10: 7

## 2022-10-16 NOTE — PROGRESS NOTES
V2.0  [facility] Hospitalist Progress Note      Name:  Genny Miller /Age/Sex: 1950  (67 y.o. male)   MRN & CSN:  [id number] & [id number] Encounter Date/Time: 10/16/2022 3:38 PM EDT    Location:  [address] [place] [address] PCP: Greg Doran Day: 6    Assessment and Plan:   Genny Miller is a 67 y.o. male with pmh of of CAD, CHF, stage III, T2DM who presents with shortness of breath, scrotal swelling, who presents with Heart failure exacerbation. Plan:  Decompensated diastolic congestive heart failure  -shortness of breath and bilateral lower extremities swelling and scrotal swelling  -appreciate cardiology input: Continue milrinone drip and hold diuretics today due to angiogram tomorrow  -improving legs swelling today  -keep monitor      Chronic kidney disease stage 4  -creatine 2.4, on baseline  -appreciate nephrology input: \"Accurately watched urine output-if suboptimal urine output such as less than 3 L-either intensify the loop or add thiazide-but watch closely for intravascular volume status, especially with right heart failure-unfortunately we do not have \"silver bullet\"-have to use her clinical judgment with other data Watch for iatrogenic nosocomial complication, follow clinically and biochemically\"     Severe PAD  Multiple wounds   -on bilateral feet and scrotum  -appreciate surgery input: continue wound care daily  -Cardiology will perform angiogram on Monday     Chronic respiratory failure with hypoxia  COPD  -no exacerbation  -3 L NC home oxygen, baseline  -keep monitor     Hypertension  -continue home medis     CAD  AICD  -continue plavix, statin and beta-blocker     Hyperlipidemia  -continue statin     Type 2 diabetes  -hemoglobin 7.1  -SSI  -monitor BS       Diet ADULT DIET; Regular;  Low Sodium (2 gm)   DVT Prophylaxis [x] Lovenox, []  Heparin, [] SCDs, [] Ambulation,  [] Eliquis, [] Xarelto  [] Coumadin   Code Status Full Code   Disposition From: Home  Expected Disposition: Home  Estimated Date of Discharge: Pending  Patient requires continued admission due to medical condition   Surrogate Decision Maker/ POA      Subjective:     Chief Complaint: Leg Swelling (B/L)       Genny Miller is a 67 y.o. male  pmh of of CAD, CHF, stage III, T2DM who presents with shortness of breath, scrotal swelling, who presents with Heart failure exacerbation. Patient has been on milrinone drip and IV diuretics since admission. Patient reported feeling much better today. Cardiology, nephrology, and general surgery are on board. Patient has severe PAD which cause multiple ulcers in bilateral feet. Cardiology plan to do angiogram on Monday. Review of Systems:    Review of Systems   Constitutional: Negative. HENT: Negative. Eyes: Negative. Respiratory: Negative. Cardiovascular:  Positive for leg swelling. Gastrointestinal: Negative. Endocrine: Negative. Genitourinary: Negative. Musculoskeletal: Negative. Skin:  Positive for wound. Allergic/Immunologic: Negative. Neurological: Negative. Hematological: Negative. Psychiatric/Behavioral: Negative. Objective: Intake/Output Summary (Last 24 hours) at 10/16/2022 1538  Last data filed at 10/16/2022 0459  Gross per 24 hour   Intake --   Output 400 ml   Net -400 ml        Vitals:   Vitals:    10/16/22 1435   BP: (!) (P) 134/50   Pulse: (P) 72   Resp: (P) 10   Temp: (P) 98.5 °F (36.9 °C)   SpO2: (P) 93%       Physical Exam:     General: NAD  Eyes: EOMI  ENT: neck supple  Cardiovascular: Regular rate. Respiratory: Clear to auscultation  Gastrointestinal: Soft, non tender  Genitourinary: no suprapubic tenderness  Musculoskeletal: Mild edema in bilateral lower extremities  Skin: warm, dry, open wounds to Right great toe; right lateral plantar open wound; Left great and second toe;open wound , Right ischium wound   Neuro: Alert and oriented  Psych: Mood appropriate.      Medications:   Medications:    magnesium oxide  400 mg Oral BID    carvedilol  6.25 mg Oral BID    hydrALAZINE  25 mg Oral 3 times per day    isosorbide mononitrate  30 mg Oral Daily    lidocaine PF  5 mL IntraDERmal Once    sodium chloride flush  5-40 mL IntraVENous 2 times per day    atorvastatin  40 mg Oral Nightly    clopidogrel  75 mg Oral Daily    gabapentin  300 mg Oral TID    metaxalone  800 mg Oral TID    tiotropium  2 puff Inhalation Daily    sodium chloride flush  5-40 mL IntraVENous 2 times per day    enoxaparin  30 mg SubCUTAneous BID    insulin lispro  0-4 Units SubCUTAneous TID WC    insulin lispro  0-4 Units SubCUTAneous Nightly    collagenase   Topical Daily      Infusions:    milrinone 0.375 mcg/kg/min (10/16/22 0510)    sodium chloride      sodium chloride      dextrose       PRN Meds: sodium chloride flush, 5-40 mL, PRN  sodium chloride, , PRN  albuterol sulfate HFA, 2 puff, Q4H PRN  oxyCODONE-acetaminophen, 1 tablet, BID PRN  sodium chloride flush, 5-40 mL, PRN  sodium chloride, , PRN  acetaminophen, 650 mg, Q4H PRN  ondansetron, 4 mg, Q8H PRN   Or  ondansetron, 4 mg, Q6H PRN  glucose, 4 tablet, PRN  dextrose bolus, 125 mL, PRN   Or  dextrose bolus, 250 mL, PRN  glucagon (rDNA), 1 mg, PRN  dextrose, , Continuous PRN        Labs      Recent Results (from the past 24 hour(s))   POCT Glucose    Collection Time: 10/15/22  4:53 PM   Result Value Ref Range    POC Glucose 203 (H) 70 - 99 MG/DL   POCT Glucose    Collection Time: 10/15/22  8:58 PM   Result Value Ref Range    POC Glucose 216 (H) 70 - 99 MG/DL   POCT Glucose    Collection Time: 10/16/22  8:08 AM   Result Value Ref Range    POC Glucose 199 (H) 70 - 99 MG/DL   POCT Glucose    Collection Time: 10/16/22 12:25 PM   Result Value Ref Range    POC Glucose 212 (H) 70 - 99 MG/DL        Imaging/Diagnostics Last 24 Hours   XR CHEST PORTABLE    Result Date: 10/16/2022  EXAMINATION: ONE XRAY VIEW OF THE CHEST 10/16/2022 11:06 am COMPARISON: 10/11/2022 HISTORY: ORDERING SYSTEM PROVIDED HISTORY: Follow-up on pulmonary edema TECHNOLOGIST PROVIDED HISTORY: Reason for exam:->Follow-up on pulmonary edema Reason for Exam: Follow-up on pulmonary edema FINDINGS: Transvenous pacer remains in place. Cardiomegaly. Pulmonary vascular congestion, pulmonary edema, right pleural effusion.      Findings suggest congestive heart failure       Electronically signed by Siria Gilbert CNP on 10/16/2022 at 3:38 PM

## 2022-10-16 NOTE — PROGRESS NOTES
Nephrology Progress Note  10/16/2022 10:44 AM        Subjective:   Admit Date: 10/11/2022  PCP: Kip Foster    Interval History: Decreased urine output    Diet: Reasonable    ROS: No overt shortness of breath  Urine output recorded only 400 cc  Milrinone drip      Data:     Current meds:    furosemide  40 mg IntraVENous BID    magnesium oxide  400 mg Oral BID    carvedilol  6.25 mg Oral BID    hydrALAZINE  25 mg Oral 3 times per day    isosorbide mononitrate  30 mg Oral Daily    lidocaine PF  5 mL IntraDERmal Once    sodium chloride flush  5-40 mL IntraVENous 2 times per day    atorvastatin  40 mg Oral Nightly    clopidogrel  75 mg Oral Daily    gabapentin  300 mg Oral TID    metaxalone  800 mg Oral TID    tiotropium  2 puff Inhalation Daily    sodium chloride flush  5-40 mL IntraVENous 2 times per day    enoxaparin  30 mg SubCUTAneous BID    insulin lispro  0-4 Units SubCUTAneous TID WC    insulin lispro  0-4 Units SubCUTAneous Nightly    collagenase   Topical Daily      milrinone 0.375 mcg/kg/min (10/16/22 0510)    sodium chloride      sodium chloride      dextrose           I/O last 3 completed shifts:  In: -   Out: 750 [Urine:750]    CBC: No results for input(s): WBC, HGB, PLT in the last 72 hours.        Recent Labs     10/14/22  0540 10/15/22  0052    139   K 4.4 4.4    104   CO2 26 27   BUN 36* 39*   CREATININE 2.2* 2.4*   GLUCOSE 175* 172*       Lab Results   Component Value Date    CALCIUM 7.8 (L) 10/15/2022    PHOS 3.5 10/14/2022       Objective:     Vitals: BP (!) 140/60   Pulse 82   Temp 98.4 °F (36.9 °C) (Axillary)   Resp 16   Ht 6' (1.829 m)   Wt 272 lb (123.4 kg)   SpO2 93%   BMI 36.89 kg/m² ,    General appearance: Alert awake and oriented  HEENT: At least 2+ conjunctival pallor  Neck: Seems supple  Lungs: No gross crackles  Heart: Regular rate and rhythm, implanted chest wall cardiac device which is nontender  Abdomen: Soft  Extremities: Less edema      Problem List : Impression :     Acute kidney injury with underlying CKD stage G4 A1-difficult to assess his intravascular status-with decreased urine output despite of IV loop-delayed \"plasma capillary refill\" is suspected-we will hold the diuretics and get a chest x-ray  Acute decompensated heart failure-no overt symptoms of pulmonary edema at this point  Severe atherosclerotic cardiovascular disease and chronic leg wound-aftermath of chronic illness    Recommendation/Plan  :     Chest x-ray  Hold diuretics for today-he already had this morning dose  He will need a peripheral angiogram at some point-I will coordinate with the cardiologist  If evidence of fluid overload-then will intensify the diuretics rather than decreasing it  Follow clinically and biochemically      Ruben Mckeon MD MD

## 2022-10-16 NOTE — PROGRESS NOTES
Today's plan:  contineu IV milrinone drip, will proceed with lower extremity angiogram, case discussed with Dr Tyler Rosenbaum has severe PAD on vascular ultrasound and foor ulcers,      Admit Date:  10/11/2022    Subjective:ok      Chief complaints on admission  Chief Complaint   Patient presents with    Leg Swelling     B/L         History of present illness:Anmol is a 67 y. o.year old who  presents with had concerns including Leg Swelling (B/L). Past medical history:    has a past medical history of Acid reflux, Acute MI ([address] Arthritis, Broken teeth, CAD (coronary artery disease), Cardiomyopathy ([address] CHF (congestive heart failure) ([address] Chronic back pain, Chronic kidney disease, Diabetes mellitus ([address] Diabetic neuropathy ([address] H/O cardiovascular stress test, H/O Doppler ultrasound, H/O percutaneous left heart catheterization, History of irregular heartbeat, History of syncope, Hyperlipidemia, Hypertension, Leg swelling, Necrotic toes (HCC), Neuropathy, neuropathy, PAD (peripheral artery disease) ([address] PVD (peripheral vascular disease) ([address] Sick sinus syndrome ([address] Sleep apnea, Spinal stenosis, Teeth missing, Type 2 diabetes mellitus without complication ([address] and WD-Chronic foot ulcer, left, with necrosis of bone ([address] Past surgical history:   has a past surgical history that includes Coronary angioplasty with stent; Dental surgery; Colonoscopy (08/04/2016); pacemaker placement (06/04/2010); vascular surgery; colectomy (Right, 08/26/2016); Toe amputation (Right, 09/12/2017); Toe amputation (Right, 01/09/2018); Cardiac catheterization; Cardiac defibrillator placement (06/04/2010); Coronary angioplasty; Cardiac catheterization (07/14/2017); Cardiac catheterization (11/20/2018); Toe amputation (Left, 12/26/2020); IR TUNNELED CVC PLACE WO SQ PORT/PUMP > 5 YEARS (6/14/2021); and Toe amputation (Left, 7/26/2022). Social History:   reports that he quit smoking about a year ago.  His smoking use included cigars and cigarettes. He started smoking about 42 years ago. He has a 9.00 pack-year smoking history. He has never used smokeless tobacco. He reports current drug use. Drug: Marijuana [employer]). He reports that he does not drink alcohol. Family history:  family history includes Cancer in his brother, father, and son; Diabetes in his brother, mother, and sister; Early Death (age of onset: 62) in his sister; Heart Disease in his brother and sister; High Blood Pressure in his brother, brother, and mother; Neuropathy in his sister; Other in his sister; Stroke in his mother; Vision Loss in his mother. Allergies   Allergen Reactions    Pcn [Penicillins] Hives    Fentanyl Itching         Objective:   BP (!) 137/47   Pulse (P) 72   Temp (P) 98.5 °F (36.9 °C) (Oral)   Resp (P) 10   Ht 6' (1.829 m)   Wt 272 lb (123.4 kg)   SpO2 (P) 93%   BMI 36.89 kg/m²     Intake/Output Summary (Last 24 hours) at 10/16/2022 1842  Last data filed at 10/16/2022 0459  Gross per 24 hour   Intake --   Output 400 ml   Net -400 ml         TELEMETRY:      Physical Exam:  Constitutional:  Well developed, Well nourished, No acute distress, Non-toxic appearance. HENT:  Normocephalic, Atraumatic, Bilateral external ears normal, Oropharynx moist, No oral exudates, Nose normal. Neck- Normal range of motion, No tenderness, Supple, No stridor. Eyes:  PERRL, EOMI, Conjunctiva normal, No discharge. Respiratory:  Normal breath sounds, No respiratory distress, No wheezing, No chest tenderness. ,no use of accessory muscles, diaphragm movement is normal  Cardiovascular: (PMI) apex non displaced,no lifts no thrills, no s3,no s4, Normal heart rate, Normal rhythm, No murmurs, No rubs, No gallops.  Carotid arteries pulse and amplitude are normal no bruit, no abdominal bruit noted ( normal abdominal aorta ausculation), femoral arteries pulse and amplitude are normal no bruit, pedal pulses are normal  GI:  Bowel sounds normal, Soft, No tenderness, No masses, No pulsatile masses. : External genitalia appear normal, No masses or lesions. No discharge. No CVA tenderness. Musculoskeletal:  full ulcer, dressed , + edema, No tenderness, No cyanosis, No clubbing. Good range of motion in all major joints. No tenderness to palpation or major deformities noted. Back- No tenderness. Integument:  Warm, Dry, No erythema, No rash. Lymphatic:  No lymphadenopathy noted. Neurologic:  Alert & oriented x 3, Normal motor function, Normal sensory function, No focal deficits noted. Psychiatric:  Affect normal, Judgment normal, Mood normal.     Medications:    magnesium oxide  400 mg Oral BID    carvedilol  6.25 mg Oral BID    hydrALAZINE  25 mg Oral 3 times per day    isosorbide mononitrate  30 mg Oral Daily    lidocaine PF  5 mL IntraDERmal Once    sodium chloride flush  5-40 mL IntraVENous 2 times per day    atorvastatin  40 mg Oral Nightly    clopidogrel  75 mg Oral Daily    gabapentin  300 mg Oral TID    metaxalone  800 mg Oral TID    tiotropium  2 puff Inhalation Daily    sodium chloride flush  5-40 mL IntraVENous 2 times per day    enoxaparin  30 mg SubCUTAneous BID    insulin lispro  0-4 Units SubCUTAneous TID WC    insulin lispro  0-4 Units SubCUTAneous Nightly    collagenase   Topical Daily      milrinone 0.375 mcg/kg/min (10/16/22 0510)    sodium chloride      sodium chloride      dextrose       sodium chloride flush, sodium chloride, albuterol sulfate HFA, oxyCODONE-acetaminophen, sodium chloride flush, sodium chloride, acetaminophen, ondansetron **OR** ondansetron, glucose, dextrose bolus **OR** dextrose bolus, glucagon (rDNA), dextrose    Lab Data:  CBC:   No results for input(s): WBC, HGB, HCT, MCV, PLT in the last 72 hours.     BMP:   Recent Labs     10/14/22  0540 10/15/22  0052    139   K 4.4 4.4    104   CO2 26 27   PHOS 3.5  --    BUN 36* 39*   CREATININE 2.2* 2.4*       LIVER PROFILE:   Recent Labs     10/14/22  0540 10/15/22  0052 AST 11* 11*   ALT 6* 5*   BILITOT 1.1* 1.1*   ALKPHOS 81 73       PT/INR: No results for input(s): PROTIME, INR in the last 72 hours. APTT: No results for input(s): APTT in the last 72 hours. BNP:  No results for input(s): BNP in the last 72 hours. TROPONIN: @TROPONINI:3@      Assessment:  67 y. o.year old who is admitted for          Plan:  Biventericular failure: on IV milrinone drip and lasix IV, case discussed with nepthrology today,   Severe PAD and foot ulcer, arterial doppler suggested diminished blood flow to the feet, will proceed with lower extermtiy angiogram, case discussed with  nephrology  CAD AND  cardiomyopathy with CKD: [facility] performed 2018 ,patient stent, will continue medical treatment, has ICD  ICD: will interrogate optivol  Health maintenance: exerise and diet  All labs, medications and tests reviewed, continue all other medications of all above medical condition listed as is.       Lori Ling MD, MD 10/16/2022 6:42 PM

## 2022-10-16 NOTE — PROGRESS NOTES
Today's plan:  contineu IV milrinone drip, will proceed with lower extremity angiogram on Monday, case discussed with Dr Fritz Apo has severe PAD on vascular ultrasound and foor ulcers,      Admit Date:  10/11/2022    Subjective:ok      Chief complaints on admission  Chief Complaint   Patient presents with    Leg Swelling     B/L         History of present illness:Anmol is a 67 y. o.year old who  presents with had concerns including Leg Swelling (B/L). Past medical history:    has a past medical history of Acid reflux, Acute MI ([address] Arthritis, Broken teeth, CAD (coronary artery disease), Cardiomyopathy ([address] CHF (congestive heart failure) ([address] Chronic back pain, Chronic kidney disease, Diabetes mellitus ([address] Diabetic neuropathy ([address] H/O cardiovascular stress test, H/O Doppler ultrasound, H/O percutaneous left heart catheterization, History of irregular heartbeat, History of syncope, Hyperlipidemia, Hypertension, Leg swelling, Necrotic toes (HCC), Neuropathy, neuropathy, PAD (peripheral artery disease) ([address] PVD (peripheral vascular disease) ([address] Sick sinus syndrome ([address] Sleep apnea, Spinal stenosis, Teeth missing, Type 2 diabetes mellitus without complication ([address] and WD-Chronic foot ulcer, left, with necrosis of bone ([address] Past surgical history:   has a past surgical history that includes Coronary angioplasty with stent; Dental surgery; Colonoscopy (08/04/2016); pacemaker placement (06/04/2010); vascular surgery; colectomy (Right, 08/26/2016); Toe amputation (Right, 09/12/2017); Toe amputation (Right, 01/09/2018); Cardiac catheterization; Cardiac defibrillator placement (06/04/2010); Coronary angioplasty; Cardiac catheterization (07/14/2017); Cardiac catheterization (11/20/2018); Toe amputation (Left, 12/26/2020); IR TUNNELED CVC PLACE WO SQ PORT/PUMP > 5 YEARS (6/14/2021); and Toe amputation (Left, 7/26/2022). Social History:   reports that he quit smoking about a year ago.  His smoking use included cigars and cigarettes. He started smoking about 42 years ago. He has a 9.00 pack-year smoking history. He has never used smokeless tobacco. He reports current drug use. Drug: Marijuana [place]). He reports that he does not drink alcohol. Family history:  family history includes Cancer in his brother, father, and son; Diabetes in his brother, mother, and sister; Early Death (age of onset: 62) in his sister; Heart Disease in his brother and sister; High Blood Pressure in his brother, brother, and mother; Neuropathy in his sister; Other in his sister; Stroke in his mother; Vision Loss in his mother. Allergies   Allergen Reactions    Pcn [Penicillins] Hives    Fentanyl Itching         Objective:   /63   Pulse 74   Temp 98 °F (36.7 °C) (Oral)   Resp 16   Ht 6' (1.829 m)   Wt 272 lb (123.4 kg)   SpO2 92%   BMI 36.89 kg/m²     Intake/Output Summary (Last 24 hours) at 10/15/2022 2019  Last data filed at 10/15/2022 0646  Gross per 24 hour   Intake --   Output 350 ml   Net -350 ml       TELEMETRY:      Physical Exam:  Constitutional:  Well developed, Well nourished, No acute distress, Non-toxic appearance. HENT:  Normocephalic, Atraumatic, Bilateral external ears normal, Oropharynx moist, No oral exudates, Nose normal. Neck- Normal range of motion, No tenderness, Supple, No stridor. Eyes:  PERRL, EOMI, Conjunctiva normal, No discharge. Respiratory:  Normal breath sounds, No respiratory distress, No wheezing, No chest tenderness. ,no use of accessory muscles, diaphragm movement is normal  Cardiovascular: (PMI) apex non displaced,no lifts no thrills, no s3,no s4, Normal heart rate, Normal rhythm, No murmurs, No rubs, No gallops.  Carotid arteries pulse and amplitude are normal no bruit, no abdominal bruit noted ( normal abdominal aorta ausculation), femoral arteries pulse and amplitude are normal no bruit, pedal pulses are normal  GI:  Bowel sounds normal, Soft, No tenderness, No masses, No pulsatile masses. : External genitalia appear normal, No masses or lesions. No discharge. No CVA tenderness. Musculoskeletal:  full ulcer, dressed , + edema, No tenderness, No cyanosis, No clubbing. Good range of motion in all major joints. No tenderness to palpation or major deformities noted. Back- No tenderness. Integument:  Warm, Dry, No erythema, No rash. Lymphatic:  No lymphadenopathy noted. Neurologic:  Alert & oriented x 3, Normal motor function, Normal sensory function, No focal deficits noted.    Psychiatric:  Affect normal, Judgment normal, Mood normal.     Medications:    furosemide  40 mg IntraVENous BID    magnesium oxide  400 mg Oral BID    carvedilol  6.25 mg Oral BID    hydrALAZINE  25 mg Oral 3 times per day    isosorbide mononitrate  30 mg Oral Daily    lidocaine PF  5 mL IntraDERmal Once    sodium chloride flush  5-40 mL IntraVENous 2 times per day    atorvastatin  40 mg Oral Nightly    clopidogrel  75 mg Oral Daily    gabapentin  300 mg Oral TID    metaxalone  800 mg Oral TID    tiotropium  2 puff Inhalation Daily    sodium chloride flush  5-40 mL IntraVENous 2 times per day    enoxaparin  30 mg SubCUTAneous BID    insulin lispro  0-4 Units SubCUTAneous TID WC    insulin lispro  0-4 Units SubCUTAneous Nightly    collagenase   Topical Daily      milrinone 0.375 mcg/kg/min (10/15/22 1357)    sodium chloride      sodium chloride      dextrose       sodium chloride flush, sodium chloride, albuterol sulfate HFA, oxyCODONE-acetaminophen, sodium chloride flush, sodium chloride, acetaminophen, ondansetron **OR** ondansetron, glucose, dextrose bolus **OR** dextrose bolus, glucagon (rDNA), dextrose    Lab Data:  CBC:   Recent Labs     10/13/22  0917   WBC 5.5   HGB 9.1*   HCT 30.8*   MCV 90.9        BMP:   Recent Labs     10/13/22  0917 10/14/22  0540 10/15/22  0052     141 139 139   K 4.6  4.6 4.4 4.4     107 106 104   CO2 27  26 26 27   PHOS  --  3.5  --    BUN 35* 34* 36* 39*   CREATININE 2.2*  2.2* 2.2* 2.4*     LIVER PROFILE:   Recent Labs     10/13/22  0917 10/14/22  0540 10/15/22  0052   AST 13* 11* 11*   ALT 7* 6* 5*   BILITOT 0.9 1.1* 1.1*   ALKPHOS 78 81 73     PT/INR: No results for input(s): PROTIME, INR in the last 72 hours. APTT: No results for input(s): APTT in the last 72 hours. BNP:  No results for input(s): BNP in the last 72 hours. TROPONIN: @TROPONINI:3@      Assessment:  67 y. o.year old who is admitted for          Plan:  Biventericular failure: on IV milrinone drip and lasix IV, case discussed with nepthrology today,   Severe PAD and foot ulcer, arterial doppler suggested diminished blood flow to the feet, will proceed with lower extermtiy angiogram, case discussed with  nephrology  CAD AND  cardiomyopathy with CKD: [facility] performed 2018 ,patient stent, will continue medical treatment, has ICD  ICD: will interrogate optivol  Health maintenance: exerise and diet  All labs, medications and tests reviewed, continue all other medications of all above medical condition listed as is.       Aram Cueva MD, MD 10/15/2022 8:19 PM

## 2022-10-17 LAB
ALBUMIN SERPL-MCNC: 2.8 GM/DL (ref 3.4–5)
ALP BLD-CCNC: 69 IU/L (ref 40–129)
ALT SERPL-CCNC: 6 U/L (ref 10–40)
ANION GAP SERPL CALCULATED.3IONS-SCNC: 6 MMOL/L (ref 4–16)
AST SERPL-CCNC: 9 IU/L (ref 15–37)
BILIRUB SERPL-MCNC: 0.8 MG/DL (ref 0–1)
BUN BLDV-MCNC: 46 MG/DL (ref 6–23)
CALCIUM SERPL-MCNC: 7.9 MG/DL (ref 8.3–10.6)
CHLORIDE BLD-SCNC: 103 MMOL/L (ref 99–110)
CO2: 28 MMOL/L (ref 21–32)
CREAT SERPL-MCNC: 3 MG/DL (ref 0.9–1.3)
CULTURE: ABNORMAL
GFR AFRICAN AMERICAN: 25 ML/MIN/1.73M2
GFR NON-AFRICAN AMERICAN: 21 ML/MIN/1.73M2
GLUCOSE BLD-MCNC: 186 MG/DL (ref 70–99)
GLUCOSE BLD-MCNC: 190 MG/DL (ref 70–99)
GLUCOSE BLD-MCNC: 196 MG/DL (ref 70–99)
GLUCOSE BLD-MCNC: 236 MG/DL (ref 70–99)
GLUCOSE BLD-MCNC: 260 MG/DL (ref 70–99)
Lab: ABNORMAL
POTASSIUM SERPL-SCNC: 4.5 MMOL/L (ref 3.5–5.1)
SODIUM BLD-SCNC: 137 MMOL/L (ref 135–145)
SPECIMEN: ABNORMAL
TOTAL PROTEIN: 5.9 GM/DL (ref 6.4–8.2)

## 2022-10-17 PROCEDURE — 2700000000 HC OXYGEN THERAPY PER DAY

## 2022-10-17 PROCEDURE — 6370000000 HC RX 637 (ALT 250 FOR IP): Performed by: STUDENT IN AN ORGANIZED HEALTH CARE EDUCATION/TRAINING PROGRAM

## 2022-10-17 PROCEDURE — 82570 ASSAY OF URINE CREATININE: CPT

## 2022-10-17 PROCEDURE — 6360000002 HC RX W HCPCS: Performed by: STUDENT IN AN ORGANIZED HEALTH CARE EDUCATION/TRAINING PROGRAM

## 2022-10-17 PROCEDURE — 97112 NEUROMUSCULAR REEDUCATION: CPT

## 2022-10-17 PROCEDURE — 1200000000 HC SEMI PRIVATE

## 2022-10-17 PROCEDURE — 6360000002 HC RX W HCPCS: Performed by: INTERNAL MEDICINE

## 2022-10-17 PROCEDURE — 36415 COLL VENOUS BLD VENIPUNCTURE: CPT

## 2022-10-17 PROCEDURE — 84300 ASSAY OF URINE SODIUM: CPT

## 2022-10-17 PROCEDURE — 81003 URINALYSIS AUTO W/O SCOPE: CPT

## 2022-10-17 PROCEDURE — 84156 ASSAY OF PROTEIN URINE: CPT

## 2022-10-17 PROCEDURE — 6370000000 HC RX 637 (ALT 250 FOR IP): Performed by: INTERNAL MEDICINE

## 2022-10-17 PROCEDURE — 99232 SBSQ HOSP IP/OBS MODERATE 35: CPT | Performed by: INTERNAL MEDICINE

## 2022-10-17 PROCEDURE — 94640 AIRWAY INHALATION TREATMENT: CPT

## 2022-10-17 PROCEDURE — 82962 GLUCOSE BLOOD TEST: CPT

## 2022-10-17 PROCEDURE — 2580000003 HC RX 258: Performed by: INTERNAL MEDICINE

## 2022-10-17 PROCEDURE — 94761 N-INVAS EAR/PLS OXIMETRY MLT: CPT

## 2022-10-17 PROCEDURE — 97535 SELF CARE MNGMENT TRAINING: CPT

## 2022-10-17 PROCEDURE — 97530 THERAPEUTIC ACTIVITIES: CPT

## 2022-10-17 PROCEDURE — 80053 COMPREHEN METABOLIC PANEL: CPT

## 2022-10-17 RX ORDER — FUROSEMIDE 10 MG/ML
80 INJECTION INTRAMUSCULAR; INTRAVENOUS 3 TIMES DAILY
Status: DISCONTINUED | OUTPATIENT
Start: 2022-10-17 | End: 2022-10-23

## 2022-10-17 RX ORDER — METOLAZONE 2.5 MG/1
5 TABLET ORAL DAILY
Status: DISCONTINUED | OUTPATIENT
Start: 2022-10-17 | End: 2022-10-23

## 2022-10-17 RX ADMIN — CARVEDILOL 6.25 MG: 6.25 TABLET, FILM COATED ORAL at 19:44

## 2022-10-17 RX ADMIN — ATORVASTATIN CALCIUM 40 MG: 40 TABLET, FILM COATED ORAL at 19:43

## 2022-10-17 RX ADMIN — FUROSEMIDE 80 MG: 10 INJECTION, SOLUTION INTRAMUSCULAR; INTRAVENOUS at 08:45

## 2022-10-17 RX ADMIN — ENOXAPARIN SODIUM 30 MG: 100 INJECTION SUBCUTANEOUS at 00:04

## 2022-10-17 RX ADMIN — ISOSORBIDE MONONITRATE 30 MG: 30 TABLET, EXTENDED RELEASE ORAL at 08:46

## 2022-10-17 RX ADMIN — CARVEDILOL 6.25 MG: 6.25 TABLET, FILM COATED ORAL at 00:05

## 2022-10-17 RX ADMIN — MILRINONE LACTATE 0.38 MCG/KG/MIN: 200 INJECTION, SOLUTION INTRAVENOUS at 00:03

## 2022-10-17 RX ADMIN — HYDRALAZINE HYDROCHLORIDE 25 MG: 25 TABLET ORAL at 21:29

## 2022-10-17 RX ADMIN — Medication 400 MG: at 08:46

## 2022-10-17 RX ADMIN — OXYCODONE AND ACETAMINOPHEN 1 TABLET: 5; 325 TABLET ORAL at 08:46

## 2022-10-17 RX ADMIN — GABAPENTIN 300 MG: 300 CAPSULE ORAL at 19:44

## 2022-10-17 RX ADMIN — FUROSEMIDE 80 MG: 10 INJECTION, SOLUTION INTRAMUSCULAR; INTRAVENOUS at 12:56

## 2022-10-17 RX ADMIN — ATORVASTATIN CALCIUM 40 MG: 40 TABLET, FILM COATED ORAL at 00:05

## 2022-10-17 RX ADMIN — HYDRALAZINE HYDROCHLORIDE 25 MG: 25 TABLET ORAL at 00:04

## 2022-10-17 RX ADMIN — CARVEDILOL 6.25 MG: 6.25 TABLET, FILM COATED ORAL at 08:46

## 2022-10-17 RX ADMIN — GABAPENTIN 300 MG: 300 CAPSULE ORAL at 08:46

## 2022-10-17 RX ADMIN — GABAPENTIN 300 MG: 300 CAPSULE ORAL at 12:52

## 2022-10-17 RX ADMIN — SODIUM CHLORIDE, PRESERVATIVE FREE 10 ML: 5 INJECTION INTRAVENOUS at 08:45

## 2022-10-17 RX ADMIN — FUROSEMIDE 80 MG: 10 INJECTION, SOLUTION INTRAMUSCULAR; INTRAVENOUS at 19:43

## 2022-10-17 RX ADMIN — METOLAZONE 5 MG: 5 TABLET ORAL at 08:46

## 2022-10-17 RX ADMIN — GABAPENTIN 300 MG: 300 CAPSULE ORAL at 00:05

## 2022-10-17 RX ADMIN — TIOTROPIUM BROMIDE INHALATION SPRAY 2 PUFF: 3.12 SPRAY, METERED RESPIRATORY (INHALATION) at 12:23

## 2022-10-17 RX ADMIN — ENOXAPARIN SODIUM 30 MG: 100 INJECTION SUBCUTANEOUS at 19:44

## 2022-10-17 RX ADMIN — CLOPIDOGREL BISULFATE 75 MG: 75 TABLET ORAL at 08:46

## 2022-10-17 RX ADMIN — HYDRALAZINE HYDROCHLORIDE 25 MG: 25 TABLET ORAL at 06:44

## 2022-10-17 RX ADMIN — INSULIN LISPRO 1 UNITS: 100 INJECTION, SOLUTION INTRAVENOUS; SUBCUTANEOUS at 16:58

## 2022-10-17 RX ADMIN — SODIUM CHLORIDE, PRESERVATIVE FREE 10 ML: 5 INJECTION INTRAVENOUS at 19:41

## 2022-10-17 RX ADMIN — Medication 400 MG: at 00:05

## 2022-10-17 RX ADMIN — SODIUM CHLORIDE, PRESERVATIVE FREE 10 ML: 5 INJECTION INTRAVENOUS at 00:05

## 2022-10-17 RX ADMIN — COLLAGENASE SANTYL: 250 OINTMENT TOPICAL at 08:47

## 2022-10-17 RX ADMIN — Medication 400 MG: at 19:44

## 2022-10-17 RX ADMIN — HYDRALAZINE HYDROCHLORIDE 25 MG: 25 TABLET ORAL at 12:52

## 2022-10-17 ASSESSMENT — ENCOUNTER SYMPTOMS
EYES NEGATIVE: 1
GASTROINTESTINAL NEGATIVE: 1
ALLERGIC/IMMUNOLOGIC NEGATIVE: 1
RESPIRATORY NEGATIVE: 1

## 2022-10-17 ASSESSMENT — PAIN DESCRIPTION - LOCATION: LOCATION: BACK

## 2022-10-17 ASSESSMENT — PAIN - FUNCTIONAL ASSESSMENT: PAIN_FUNCTIONAL_ASSESSMENT: ACTIVITIES ARE NOT PREVENTED

## 2022-10-17 ASSESSMENT — PAIN SCALES - GENERAL
PAINLEVEL_OUTOF10: 8
PAINLEVEL_OUTOF10: 0

## 2022-10-17 ASSESSMENT — PAIN DESCRIPTION - DESCRIPTORS: DESCRIPTORS: ACHING

## 2022-10-17 ASSESSMENT — PAIN DESCRIPTION - FREQUENCY: FREQUENCY: INTERMITTENT

## 2022-10-17 ASSESSMENT — PAIN DESCRIPTION - PAIN TYPE: TYPE: CHRONIC PAIN

## 2022-10-17 ASSESSMENT — PAIN DESCRIPTION - ONSET: ONSET: ON-GOING

## 2022-10-17 ASSESSMENT — PAIN DESCRIPTION - ORIENTATION: ORIENTATION: LOWER

## 2022-10-17 NOTE — PROGRESS NOTES
Unable to attest WAGNER note:    I discussed the case with the WAGNER. Labs and imaging were reviewed. I saw and independently evaluated the patient. I agree with the assessment and plan as outlined by the WAGNER below. My documented MDM was a substantial portion of the supervisory note. Patient seen for decompensated heart failure and severe PAD, worsening NANCY. Some bilateral LE pain today, remains on 3L NC. On exam patient is in no acute respiratory distress, on 3L NC anterior sounds clear, lower extremity edema significantly improved and kerlix wraps c/d/I. Will continue Milrinone and IV diuresis increased today; plan for Angiogram given his severe PAD and timing is being coordinated between Cardiology and Nephrology; noted wound cultures and was seen by General Surgery (note incomplete in Epic) with no need for surgical debridement and wounds and lower extremities do not seem to be acutely infected; did have prior wound culture a few months ago also growing Pseudomonas so likely colonized but will follow and if develops any signs of acute infection will add abx. Repeat CBC in AM as well as BMP. Will monitor today.

## 2022-10-17 NOTE — PROGRESS NOTES
Nephrology Progress Note  10/17/2022 7:10 AM        Subjective:   Admit Date: 10/11/2022  PCP: Gennaro Ryan    Interval History: No major event, feeling better  Started making some urine without diuretics    Diet: Reasonable    ROS: No shortness of breath, he feels edema is better  Urine output was not recorded  No fever and acceptable blood pressure  Still with milrinone drip    Data:     Current meds:    magnesium oxide  400 mg Oral BID    carvedilol  6.25 mg Oral BID    hydrALAZINE  25 mg Oral 3 times per day    isosorbide mononitrate  30 mg Oral Daily    lidocaine PF  5 mL IntraDERmal Once    sodium chloride flush  5-40 mL IntraVENous 2 times per day    atorvastatin  40 mg Oral Nightly    clopidogrel  75 mg Oral Daily    gabapentin  300 mg Oral TID    metaxalone  800 mg Oral TID    tiotropium  2 puff Inhalation Daily    sodium chloride flush  5-40 mL IntraVENous 2 times per day    enoxaparin  30 mg SubCUTAneous BID    insulin lispro  0-4 Units SubCUTAneous TID WC    insulin lispro  0-4 Units SubCUTAneous Nightly    collagenase   Topical Daily      milrinone 0.375 mcg/kg/min (10/17/22 0003)    sodium chloride      sodium chloride      dextrose           I/O last 3 completed shifts: In: 4446 [P.O.:240; I.V.:1111]  Out: 400 [Urine:400]    CBC: No results for input(s): WBC, HGB, PLT in the last 72 hours.        Recent Labs     10/15/22  0052      K 4.4      CO2 27   BUN 39*   CREATININE 2.4*   GLUCOSE 172*       Lab Results   Component Value Date    CALCIUM 7.8 (L) 10/15/2022    PHOS 3.5 10/14/2022       Objective:     Vitals: /61   Pulse 69   Temp 97.9 °F (36.6 °C) (Oral)   Resp 12   Ht 6' (1.829 m)   Wt 272 lb (123.4 kg)   SpO2 94%   BMI 36.89 kg/m² ,    General appearance: Alert, awake and oriented  HEENT: Positive conjunctival pallor  Neck: Supple  Lungs: Coarse breath sounds no crackles  Heart: Regular rate and rhythm, chest wall implanted cardiac device  Abdomen: Soft  Extremities: Edema seems little better      Problem List :         Impression :     Acute kidney injury with underlying CKD stage G4 A1-diuretics is on hold-chest x-ray still suggest pulmonary edema  Acute decompensated heart failure with underlying atherosclerotic cardiovascular disease  Chronic leg wound    Recommendation/Plan  :     Restart higher dose of diuretics 80 IV 3 times daily  If ineffective then block other renal tubular segment  Watch urine output  Low-salt  Follow clinically      Remy Coronado MD MD

## 2022-10-17 NOTE — PROGRESS NOTES
V2.0  [facility] Hospitalist Progress Note      Name:  Ioana Kline /Age/Sex: 1950  (67 y.o. male)   MRN & CSN:  [id number] & [id number] Encounter Date/Time: 10/17/2022 12:21 PM EDT    Location:  [id number]/[id number] PCP: Donnie Carrillo Day: 7    Assessment and Plan:   Ioana Kline is a 67 y.o. male with pmh of CAD, CHF, stage III, T2DM who presents with shortness of breath, scrotal swelling, who presents with Heart failure exacerbation. Cardiology consulted. Patient is currently on milrinone drip. Patient reported significant improvement of leg swelling and shortness of breath. Patient has severe PAD, he will have lower extremity angiogram today. Hopefully patient can be discharged home tomorrow. Plan:  Decompensated diastolic congestive heart failure  -shortness of breath and bilateral lower extremities swelling and scrotal swelling in admission  -Significant improved today  -appreciate cardiology input: Continue milrinone drip and  diuretics      Chronic kidney disease stage 4  -creatine 3.0 , slightly higher than baseline (1.7-2.6)  -appreciate nephrology input: \"Accurately watched urine output-if suboptimal urine output such as less than 3 L-either intensify the loop or add thiazide-but watch closely for intravascular volume status, especially with right heart failure-unfortunately we do not have \"silver bullet\"-have to use her clinical judgment with other data Watch for iatrogenic nosocomial complication, follow clinically and biochemically\"; restart higher dose of the diuretics today     Severe PAD  Multiple wounds   -on bilateral feet and scrotum  -appreciate surgery input: continue wound care daily  -Appreciate cardiology input:  Will perform angiogram today     Chronic respiratory failure with hypoxia  COPD  -no exacerbation  -3 L NC home oxygen, baseline  -keep monitor     Hypertension  -continue home medis     CAD  AICD  -continue plavix, statin and beta-blocker Hyperlipidemia  -continue statin     Type 2 diabetes  -hemoglobin 7.1  -SSI  -monitor BS    Diet Diet NPO   DVT Prophylaxis [] Lovenox, []  Heparin, [] SCDs, [] Ambulation,  [] Eliquis, [] Xarelto  [] Coumadin   Code Status Full Code   Disposition From: home  Expected Disposition: home  Estimated Date of Discharge: 1-2 days  Patient requires continued admission due to medical condition   Surrogate Decision Maker/ POA      Subjective:     Chief Complaint: Leg Swelling (B/L)       Jp Rosen is a 67 y.o. male is evaluated in the room. Patient reported better shortness of breath and leg swelling. No fever. Review of Systems:    Review of Systems   Constitutional: Negative. HENT: Negative. Eyes: Negative. Respiratory: Negative. Cardiovascular: Negative. Gastrointestinal: Negative. Endocrine: Negative. Genitourinary: Negative. Musculoskeletal: Negative. Skin: Negative. Allergic/Immunologic: Negative. Neurological: Negative. Hematological: Negative. Psychiatric/Behavioral: Negative. Objective: Intake/Output Summary (Last 24 hours) at 10/17/2022 1230  Last data filed at 10/17/2022 9888  Gross per 24 hour   Intake 1351 ml   Output --   Net 1351 ml        Vitals:   Vitals:    10/17/22 1224   BP:    Pulse:    Resp:    Temp:    SpO2: 97%       Physical Exam:     General: NAD  Eyes: EOMI  ENT: neck supple  Cardiovascular: Regular rate. Respiratory: Clear to auscultation, on 2 L NC (baseline)  Gastrointestinal: Soft, non tender  Genitourinary: no suprapubic tenderness  Musculoskeletal: No edema  Skin: warm, dry, improved edema in bilateral lower extremities  Neuro: Alert, oriented  Psych: Mood appropriate.      Medications:   Medications:    furosemide  80 mg IntraVENous TID    metOLazone  5 mg Oral Daily    magnesium oxide  400 mg Oral BID    carvedilol  6.25 mg Oral BID    hydrALAZINE  25 mg Oral 3 times per day    isosorbide mononitrate  30 mg Oral Daily    lidocaine PF  5 mL IntraDERmal Once    sodium chloride flush  5-40 mL IntraVENous 2 times per day    atorvastatin  40 mg Oral Nightly    clopidogrel  75 mg Oral Daily    gabapentin  300 mg Oral TID    metaxalone  800 mg Oral TID    tiotropium  2 puff Inhalation Daily    sodium chloride flush  5-40 mL IntraVENous 2 times per day    enoxaparin  30 mg SubCUTAneous BID    insulin lispro  0-4 Units SubCUTAneous TID WC    insulin lispro  0-4 Units SubCUTAneous Nightly    collagenase   Topical Daily      Infusions:    milrinone 0.375 mcg/kg/min (10/17/22 0846)    sodium chloride      sodium chloride      dextrose       PRN Meds: sodium chloride flush, 5-40 mL, PRN  sodium chloride, , PRN  albuterol sulfate HFA, 2 puff, Q4H PRN  oxyCODONE-acetaminophen, 1 tablet, BID PRN  sodium chloride flush, 5-40 mL, PRN  sodium chloride, , PRN  acetaminophen, 650 mg, Q4H PRN  ondansetron, 4 mg, Q8H PRN   Or  ondansetron, 4 mg, Q6H PRN  glucose, 4 tablet, PRN  dextrose bolus, 125 mL, PRN   Or  dextrose bolus, 250 mL, PRN  glucagon (rDNA), 1 mg, PRN  dextrose, , Continuous PRN      Labs      Recent Results (from the past 24 hour(s))   POCT Glucose    Collection Time: 10/16/22  4:12 PM   Result Value Ref Range    POC Glucose 273 (H) 70 - 99 MG/DL   POCT Glucose    Collection Time: 10/16/22 10:27 PM   Result Value Ref Range    POC Glucose 225 (H) 70 - 99 MG/DL   Comprehensive Metabolic Panel w/ Reflex to MG    Collection Time: 10/17/22  6:50 AM   Result Value Ref Range    Sodium 137 135 - 145 MMOL/L    Potassium 4.5 3.5 - 5.1 MMOL/L    Chloride 103 99 - 110 mMol/L    CO2 28 21 - 32 MMOL/L    BUN 46 (H) 6 - 23 MG/DL    Creatinine 3.0 (H) 0.9 - 1.3 MG/DL    Glucose 190 (H) 70 - 99 MG/DL    Calcium 7.9 (L) 8.3 - 10.6 MG/DL    Albumin 2.8 (L) 3.4 - 5.0 GM/DL    Total Protein 5.9 (L) 6.4 - 8.2 GM/DL    Total Bilirubin 0.8 0.0 - 1.0 MG/DL    ALT 6 (L) 10 - 40 U/L    AST 9 (L) 15 - 37 IU/L    Alkaline Phosphatase 69 40 - 129 IU/L    GFR Non- 21 (L) >60 mL/min/1.73m2    GFR  25 (L) >60 mL/min/1.73m2    Anion Gap 6 4 - 16   POCT Glucose    Collection Time: 10/17/22  8:17 AM   Result Value Ref Range    POC Glucose 196 (H) 70 - 99 MG/DL        Imaging/Diagnostics Last 24 Hours   XR CHEST PORTABLE    Result Date: 10/16/2022  EXAMINATION: ONE XRAY VIEW OF THE CHEST 10/16/2022 11:06 am COMPARISON: 10/11/2022 HISTORY: ORDERING SYSTEM PROVIDED HISTORY: Follow-up on pulmonary edema TECHNOLOGIST PROVIDED HISTORY: Reason for exam:->Follow-up on pulmonary edema Reason for Exam: Follow-up on pulmonary edema FINDINGS: Transvenous pacer remains in place. Cardiomegaly. Pulmonary vascular congestion, pulmonary edema, right pleural effusion.      Findings suggest congestive heart failure       Electronically signed by Felipe Gilbert CNP on 10/17/2022 at 12:30 PM

## 2022-10-17 NOTE — CONSULTS
Consult completed. Pt has patent IV access and therapeutic needs met. Primary nurse notified. Consult IV/PICC team if patient's needs change.

## 2022-10-17 NOTE — PROGRESS NOTES
Physical Therapy Treatment Note  Name: Monalisa Noland MRN: [id number] :   1950   Date:  10/17/2022   Admission Date: 10/11/2022 Room:  [address] [place] [address]     Restrictions/Precautions:          general precautions, fall risk, contact isolation    Communication with other providers:  RN, ROBERTS    Subjective:  Patient states:  \"I like being able to do things for myself\"  Pain:   Location, Type, Intensity (0/10 to 10/10):  denies pain at rest    Objective:    Observation:  pt supine in bed upon entry and agreeable to session    Treatment, including education/measures:    Bed mobility: pt completed supine to sitting EOB min A with assist at trunk. Pt completed sit to supine min A with assist at R LE. Cues provided for sequencing throughout. Pt completed rolling bilaterally SBA with cues for sequencing    Transfers: pt completed stand pivot transfer from bed to [facility] and from [facility] to bed CGA with cues for sequencing and hand placement. Pt completed STS to/from [facility] CGA.      Assessment / Impression:    Pt supine in bed at end of session with needs in reach and alarm on    Patient's tolerance of treatment:  fair   Adverse Reaction: n/a  Significant change in status and impact:  n/a  Barriers to improvement:  decreased endurance, impaired balance, impaired transfers    Plan for Next Session:    Continue to address bed mobility and transfer training in future sessions    Time in:  1120  Time out:  1153  Timed treatment minutes:  33  Total treatment time:  35    Previously filed items:           Short Term Goals  Time Frame for Short Term Goals: 1 week  Short Term Goal 1: pt to complete all bed mobility max A  Short Term Goal 2: Pt to complete stand pivot transfer with LRAD mod A  Short Term Goal 3: Pt to propel manual ' with SBA    Electronically signed by:    Rubio Sam, PT  10/17/2022, 1:11 PM

## 2022-10-17 NOTE — PROGRESS NOTES
Physician Progress Note      PATIENT:               Jeffrey Gaytan  CSN #:                  [id number]  :                       1950  ADMIT DATE:       10/11/2022 10:47 PM  [address] [place] DATE:  RESPONDING  PROVIDER #:        Janis Kaminski MD          QUERY TEXT:    Pt admitted with acute decompensated heart failure. Pt noted to also have HTN,   CAD, cardiomyopathy. Per cardiology, \"Known history of congestive heart   failure due to ischemic cardiomyopathy with ejection fraction 40 to 45%. \" If   possible, please document in progress notes and discharge summary the etiology   of CHF, if able to be determined. The medical record reflects the following:  Risk Factors: HTN, cardiomyopathy, CAD  Clinical Indicators: Echo on 10/12: Left ventricular systolic function is   normal. Ejection fraction is visually estimated at 50-55%. Treatment: Novasc, Lipitor, Coreg, Plavix, IV Lasix, Zaroxolyn, wrights,   I&O's, cardiology consult    JOSE Teran, RN  Clinical   [phone]  Options provided:  -- CHF due to Hypertensive Heart Disease  -- CHF due to Hypertensive Heart Disease and CAD  -- CHF not due to Hypertension but due to CAD  -- CHF due to Hypertensive Heart Disease and ICMP  -- CHF not due to Hypertension but due to ICMP  -- Other - I will add my own diagnosis  -- Disagree - Not applicable / Not valid  -- Disagree - Clinically unable to determine / Unknown  -- Refer to Clinical Documentation Reviewer    PROVIDER RESPONSE TEXT:    This patient has CHF due to hypertensive heart disease and CAD.     Query created by: Elkin Tarango on 10/17/2022 12:25 PM      Electronically signed by:  Janis Kaminski MD 10/17/2022 1:02 PM

## 2022-10-17 NOTE — PROGRESS NOTES
Today's plan: DC IV milrinone drip, will proceed with lower extremity angiogram, case discussed with Dr Adel Skiff has severe PAD on vascular ultrasound and foor ulcers,      Admit Date:  10/11/2022    Subjective:ok      Chief complaints on admission  Chief Complaint   Patient presents with    Leg Swelling     B/L         History of present illness:Anmol is a 67 y. o.year old who  presents with had concerns including Leg Swelling (B/L). Past medical history:    has a past medical history of Acid reflux, Acute MI ([address] Arthritis, Broken teeth, CAD (coronary artery disease), Cardiomyopathy ([address] CHF (congestive heart failure) ([address] Chronic back pain, Chronic kidney disease, Diabetes mellitus ([address] Diabetic neuropathy ([address] H/O cardiovascular stress test, H/O Doppler ultrasound, H/O percutaneous left heart catheterization, History of irregular heartbeat, History of syncope, Hyperlipidemia, Hypertension, Leg swelling, Necrotic toes (HCC), Neuropathy, neuropathy, PAD (peripheral artery disease) ([address] PVD (peripheral vascular disease) ([address] Sick sinus syndrome ([address] Sleep apnea, Spinal stenosis, Teeth missing, Type 2 diabetes mellitus without complication ([address] and WD-Chronic foot ulcer, left, with necrosis of bone ([address] Past surgical history:   has a past surgical history that includes Coronary angioplasty with stent; Dental surgery; Colonoscopy (08/04/2016); pacemaker placement (06/04/2010); vascular surgery; colectomy (Right, 08/26/2016); Toe amputation (Right, 09/12/2017); Toe amputation (Right, 01/09/2018); Cardiac catheterization; Cardiac defibrillator placement (06/04/2010); Coronary angioplasty; Cardiac catheterization (07/14/2017); Cardiac catheterization (11/20/2018); Toe amputation (Left, 12/26/2020); IR TUNNELED CVC PLACE WO SQ PORT/PUMP > 5 YEARS (6/14/2021); and Toe amputation (Left, 7/26/2022). Social History:   reports that he quit smoking about a year ago.  His smoking use included cigars and cigarettes. He started smoking about 42 years ago. He has a 9.00 pack-year smoking history. He has never used smokeless tobacco. He reports current drug use. Drug: Marijuana [place]). He reports that he does not drink alcohol. Family history:  family history includes Cancer in his brother, father, and son; Diabetes in his brother, mother, and sister; Early Death (age of onset: 62) in his sister; Heart Disease in his brother and sister; High Blood Pressure in his brother, brother, and mother; Neuropathy in his sister; Other in his sister; Stroke in his mother; Vision Loss in his mother. Allergies   Allergen Reactions    Pcn [Penicillins] Hives    Fentanyl Itching         Objective:   BP (!) 132/56   Pulse 69   Temp 98 °F (36.7 °C) (Oral)   Resp 14   Ht 6' (1.829 m)   Wt 272 lb (123.4 kg)   SpO2 93%   BMI 36.89 kg/m²     Intake/Output Summary (Last 24 hours) at 10/17/2022 1743  Last data filed at 10/17/2022 8509  Gross per 24 hour   Intake 1351 ml   Output --   Net 1351 ml         TELEMETRY:      Physical Exam:  Constitutional:  Well developed, Well nourished, No acute distress, Non-toxic appearance. HENT:  Normocephalic, Atraumatic, Bilateral external ears normal, Oropharynx moist, No oral exudates, Nose normal. Neck- Normal range of motion, No tenderness, Supple, No stridor. Eyes:  PERRL, EOMI, Conjunctiva normal, No discharge. Respiratory:  Normal breath sounds, No respiratory distress, No wheezing, No chest tenderness. ,no use of accessory muscles, diaphragm movement is normal  Cardiovascular: (PMI) apex non displaced,no lifts no thrills, no s3,no s4, Normal heart rate, Normal rhythm, No murmurs, No rubs, No gallops.  Carotid arteries pulse and amplitude are normal no bruit, no abdominal bruit noted ( normal abdominal aorta ausculation), femoral arteries pulse and amplitude are normal no bruit, pedal pulses are normal  GI:  Bowel sounds normal, Soft, No tenderness, No masses, No pulsatile masses. : External genitalia appear normal, No masses or lesions. No discharge. No CVA tenderness. Musculoskeletal:  foot ulcer, dressed , + edema, No tenderness, No cyanosis, No clubbing. Good range of motion in all major joints. No tenderness to palpation or major deformities noted. Back- No tenderness. Integument:  Warm, Dry, No erythema, No rash. Lymphatic:  No lymphadenopathy noted. Neurologic:  Alert & oriented x 3, Normal motor function, Normal sensory function, No focal deficits noted. Psychiatric:  Affect normal, Judgment normal, Mood normal.     Medications:    furosemide  80 mg IntraVENous TID    metOLazone  5 mg Oral Daily    magnesium oxide  400 mg Oral BID    carvedilol  6.25 mg Oral BID    hydrALAZINE  25 mg Oral 3 times per day    isosorbide mononitrate  30 mg Oral Daily    lidocaine PF  5 mL IntraDERmal Once    sodium chloride flush  5-40 mL IntraVENous 2 times per day    atorvastatin  40 mg Oral Nightly    clopidogrel  75 mg Oral Daily    gabapentin  300 mg Oral TID    metaxalone  800 mg Oral TID    tiotropium  2 puff Inhalation Daily    sodium chloride flush  5-40 mL IntraVENous 2 times per day    enoxaparin  30 mg SubCUTAneous BID    insulin lispro  0-4 Units SubCUTAneous TID WC    insulin lispro  0-4 Units SubCUTAneous Nightly    collagenase   Topical Daily      milrinone 0.375 mcg/kg/min (10/17/22 0846)    sodium chloride      sodium chloride      dextrose       sodium chloride flush, sodium chloride, albuterol sulfate HFA, oxyCODONE-acetaminophen, sodium chloride flush, sodium chloride, acetaminophen, ondansetron **OR** ondansetron, glucose, dextrose bolus **OR** dextrose bolus, glucagon (rDNA), dextrose    Lab Data:  CBC:   No results for input(s): WBC, HGB, HCT, MCV, PLT in the last 72 hours.     BMP:   Recent Labs     10/15/22  0052 10/17/22  0650    137   K 4.4 4.5    103   CO2 27 28   BUN 39* 46*   CREATININE 2.4* 3.0*       LIVER PROFILE:   Recent Labs 10/15/22  0052 10/17/22  0650   AST 11* 9*   ALT 5* 6*   BILITOT 1.1* 0.8   ALKPHOS 73 69       PT/INR: No results for input(s): PROTIME, INR in the last 72 hours. APTT: No results for input(s): APTT in the last 72 hours. BNP:  No results for input(s): BNP in the last 72 hours. TROPONIN: @TROPONINI:3@      Assessment:  67 y. o.year old who is admitted for          Plan:  Biventericular failure: on IV milrinone drip and lasix IV, case discussed with nepthrology today,   Severe PAD and foot ulcer, arterial doppler suggested diminished blood flow to the feet, will proceed with lower extermtiy angiogram, case discussed with  nephrology  CAD AND  cardiomyopathy with CKD: [facility] performed 2018 ,patient stent, will continue medical treatment, has ICD  ICD: will interrogate optivol  Health maintenance: exerise and diet  All labs, medications and tests reviewed, continue all other medications of all above medical condition listed as is.       Melecio Latham MD, MD 10/17/2022 5:43 PM

## 2022-10-17 NOTE — PROGRESS NOTES
Occupational Therapy  . Occupational Therapy Treatment Note    Name: Peyton Valentin MRN: [id number] :   1950   Date:  10/17/2022   Admission Date: 10/11/2022 Room:  [address] [place] [address]-A     Primary Problem:      Restrictions/Precautions:          General precautions, fall risk      Communication with other providers: cotx with PT for assist and safety as well as patient tolerance with therapy. Subjective:  Patient states:  thank you ladies, I could use the toilet. Pain:   Location, Type, Intensity (0/10 to 10/10):  no numerical rating but did c/o pain with sitting on BSC    Objective:    Observation: patient supine. Agreeable  to transfer to [facility]. has hemorrhoids. Patients cannula not on. Desat to 80%. Gown not on. Objective Measures:  tele,     Treatment, including education:    ADL activity training was instructed today. Cues were given for safety, sequence, UE/LE placement, visual cues, and balance. Activities performed today included dressing, toileting,     Toileting- on BSC- DEP for elinor care partial care in partial stance with completion in side lying. Increased time needed to have BM. UB dressing- DEP to don new robe. Therapeutic activity training was instructed today. Cues were given for safety, sequence, UE/LE placement, awareness, and balance. Activities performed today included bed mobility training, sup-sit, sit-stand, SPT. Supine to EOB- SBA with increased time and effort. EOB sitting Balance- fair - noted x1 Lob which required assist for correction. While EOB patient used bed rails for support and at times would sway or lean backwards. SPT to [facility]- CGA  Sit to [facility]- CGA with  multiple partial stands to adjust self on commode. Sitting balance on BSC- fair patient with R lateral lean d/t being uncomfortable throughout. Would adjust self multiple times throughout. Increased time needed. Partial stand from [facility]  for elinor care- CGA for safety. Tolerated x1 min.    Sit to EOB- CGA.   Return supine- Min A for BLE. Rolling- SBA with use of bed features. Boost to [facility]- in trendelenburg- Dep x2    Patient educated on role of OT , benefits of OT and rationale for therapeutic intervention. Benefit of OOB/EOB activities, benefit of movement. AE/AD, WS/EC,     All therapeutic intervention performed c emphasis on dynamic balance / standing tolerance to increase strength, endurance and activity tolerance for increased [place] c ADL tasks and func transfers / mobility. Patient left safely in bed at end of session, with call light in reach, alarm on and nursing aware. Gait belt was used for func transfers / mobility.         Assessment / Impression:    Patient's tolerance of treatment: Well  Adverse Reaction: None  Significant change in status and impact: Improved from previous session  Barriers to improvement: weakness      Plan for Next Session:    Continue with OT POC      Time in:  1120  Time out:  1153  Timed treatment minutes:  33  Total treatment time:  33      Electronically signed by:    BETINA Cantrell COTA/LAURA [id number]    10/17/2022, 1:08 PM

## 2022-10-18 LAB
ACTIVATED CLOTTING TIME, LOW RANGE: 175 SEC
ACTIVATED CLOTTING TIME, LOW RANGE: 194 SEC
ACTIVATED CLOTTING TIME, LOW RANGE: 195 SEC
ACTIVATED CLOTTING TIME, LOW RANGE: 248 SEC
ALBUMIN SERPL-MCNC: 2.6 GM/DL (ref 3.4–5)
ALP BLD-CCNC: 69 IU/L (ref 40–128)
ALT SERPL-CCNC: <5 U/L (ref 10–40)
ANION GAP SERPL CALCULATED.3IONS-SCNC: 9 MMOL/L (ref 4–16)
AST SERPL-CCNC: 9 IU/L (ref 15–37)
BASOPHILS ABSOLUTE: 0 K/CU MM
BASOPHILS RELATIVE PERCENT: 0.3 % (ref 0–1)
BILIRUB SERPL-MCNC: 0.8 MG/DL (ref 0–1)
BUN BLDV-MCNC: 47 MG/DL (ref 6–23)
CALCIUM SERPL-MCNC: 8.1 MG/DL (ref 8.3–10.6)
CHLORIDE BLD-SCNC: 99 MMOL/L (ref 99–110)
CO2: 28 MMOL/L (ref 21–32)
CREAT SERPL-MCNC: 2.8 MG/DL (ref 0.9–1.3)
DIFFERENTIAL TYPE: ABNORMAL
EOSINOPHILS ABSOLUTE: 0.1 K/CU MM
EOSINOPHILS RELATIVE PERCENT: 1.9 % (ref 0–3)
GFR SERPL CREATININE-BSD FRML MDRD: 23 ML/MIN/1.73M2
GLUCOSE BLD-MCNC: 174 MG/DL (ref 70–99)
GLUCOSE BLD-MCNC: 186 MG/DL (ref 70–99)
GLUCOSE BLD-MCNC: 205 MG/DL (ref 70–99)
GLUCOSE BLD-MCNC: 221 MG/DL (ref 70–99)
HCT VFR BLD CALC: 26.8 % (ref 42–52)
HEMOGLOBIN: 8.2 GM/DL (ref 13.5–18)
IMMATURE NEUTROPHIL %: 0.2 % (ref 0–0.43)
LYMPHOCYTES ABSOLUTE: 1.1 K/CU MM
LYMPHOCYTES RELATIVE PERCENT: 17.1 % (ref 24–44)
MCH RBC QN AUTO: 27.1 PG (ref 27–31)
MCHC RBC AUTO-ENTMCNC: 30.6 % (ref 32–36)
MCV RBC AUTO: 88.4 FL (ref 78–100)
MONOCYTES ABSOLUTE: 0.5 K/CU MM
MONOCYTES RELATIVE PERCENT: 7.6 % (ref 0–4)
NUCLEATED RBC %: 0 %
PDW BLD-RTO: 17.7 % (ref 11.7–14.9)
PLATELET # BLD: 212 K/CU MM (ref 140–440)
PMV BLD AUTO: 9.5 FL (ref 7.5–11.1)
POTASSIUM SERPL-SCNC: 4.6 MMOL/L (ref 3.5–5.1)
PRO-BNP: 7659 PG/ML
RBC # BLD: 3.03 M/CU MM (ref 4.6–6.2)
SEGMENTED NEUTROPHILS ABSOLUTE COUNT: 4.6 K/CU MM
SEGMENTED NEUTROPHILS RELATIVE PERCENT: 72.9 % (ref 36–66)
SODIUM BLD-SCNC: 136 MMOL/L (ref 135–145)
TOTAL IMMATURE NEUTOROPHIL: 0.01 K/CU MM
TOTAL NUCLEATED RBC: 0 K/CU MM
TOTAL PROTEIN: 5.8 GM/DL (ref 6.4–8.2)
WBC # BLD: 6.3 K/CU MM (ref 4–10.5)

## 2022-10-18 PROCEDURE — 2580000003 HC RX 258: Performed by: INTERNAL MEDICINE

## 2022-10-18 PROCEDURE — 6360000002 HC RX W HCPCS

## 2022-10-18 PROCEDURE — C1887 CATHETER, GUIDING: HCPCS

## 2022-10-18 PROCEDURE — C1769 GUIDE WIRE: HCPCS

## 2022-10-18 PROCEDURE — 94761 N-INVAS EAR/PLS OXIMETRY MLT: CPT

## 2022-10-18 PROCEDURE — 047L3ZZ DILATION OF LEFT FEMORAL ARTERY, PERCUTANEOUS APPROACH: ICD-10-PCS | Performed by: INTERNAL MEDICINE

## 2022-10-18 PROCEDURE — 6370000000 HC RX 637 (ALT 250 FOR IP): Performed by: INTERNAL MEDICINE

## 2022-10-18 PROCEDURE — 85025 COMPLETE CBC W/AUTO DIFF WBC: CPT

## 2022-10-18 PROCEDURE — 6370000000 HC RX 637 (ALT 250 FOR IP): Performed by: STUDENT IN AN ORGANIZED HEALTH CARE EDUCATION/TRAINING PROGRAM

## 2022-10-18 PROCEDURE — 82962 GLUCOSE BLOOD TEST: CPT

## 2022-10-18 PROCEDURE — 83880 ASSAY OF NATRIURETIC PEPTIDE: CPT

## 2022-10-18 PROCEDURE — 04HD33Z INSERTION OF INFUSION DEVICE INTO LEFT COMMON ILIAC ARTERY, PERCUTANEOUS APPROACH: ICD-10-PCS | Performed by: INTERNAL MEDICINE

## 2022-10-18 PROCEDURE — 6370000000 HC RX 637 (ALT 250 FOR IP)

## 2022-10-18 PROCEDURE — 75716 ARTERY X-RAYS ARMS/LEGS: CPT | Performed by: INTERNAL MEDICINE

## 2022-10-18 PROCEDURE — B4101ZZ FLUOROSCOPY OF ABDOMINAL AORTA USING LOW OSMOLAR CONTRAST: ICD-10-PCS | Performed by: INTERNAL MEDICINE

## 2022-10-18 PROCEDURE — 80053 COMPREHEN METABOLIC PANEL: CPT

## 2022-10-18 PROCEDURE — 94640 AIRWAY INHALATION TREATMENT: CPT

## 2022-10-18 PROCEDURE — 37224 PR REVSC OPN/PRG FEM/POP W/ANGIOPLASTY UNI: CPT | Performed by: INTERNAL MEDICINE

## 2022-10-18 PROCEDURE — B41D1ZZ FLUOROSCOPY OF AORTA AND BILATERAL LOWER EXTREMITY ARTERIES USING LOW OSMOLAR CONTRAST: ICD-10-PCS | Performed by: INTERNAL MEDICINE

## 2022-10-18 PROCEDURE — 37224 HC FEM POP TERRITORY PLASTY: CPT

## 2022-10-18 PROCEDURE — 04HL33Z INSERTION OF INFUSION DEVICE INTO LEFT FEMORAL ARTERY, PERCUTANEOUS APPROACH: ICD-10-PCS | Performed by: INTERNAL MEDICINE

## 2022-10-18 PROCEDURE — 2140000000 HC CCU INTERMEDIATE R&B

## 2022-10-18 PROCEDURE — 6360000002 HC RX W HCPCS: Performed by: INTERNAL MEDICINE

## 2022-10-18 PROCEDURE — 2709999900 HC NON-CHARGEABLE SUPPLY

## 2022-10-18 PROCEDURE — 36415 COLL VENOUS BLD VENIPUNCTURE: CPT

## 2022-10-18 PROCEDURE — 6360000004 HC RX CONTRAST MEDICATION

## 2022-10-18 PROCEDURE — C1894 INTRO/SHEATH, NON-LASER: HCPCS

## 2022-10-18 PROCEDURE — 85347 COAGULATION TIME ACTIVATED: CPT

## 2022-10-18 PROCEDURE — 75716 ARTERY X-RAYS ARMS/LEGS: CPT

## 2022-10-18 RX ORDER — ASPIRIN 81 MG/1
81 TABLET, CHEWABLE ORAL DAILY
Status: DISCONTINUED | OUTPATIENT
Start: 2022-10-19 | End: 2022-10-24 | Stop reason: HOSPADM

## 2022-10-18 RX ORDER — CLOPIDOGREL BISULFATE 75 MG/1
75 TABLET ORAL DAILY
Status: DISCONTINUED | OUTPATIENT
Start: 2022-10-19 | End: 2022-10-24 | Stop reason: HOSPADM

## 2022-10-18 RX ORDER — ACETAMINOPHEN 325 MG/1
650 TABLET ORAL EVERY 4 HOURS PRN
Status: DISCONTINUED | OUTPATIENT
Start: 2022-10-18 | End: 2022-10-24 | Stop reason: HOSPADM

## 2022-10-18 RX ORDER — SODIUM CHLORIDE 9 MG/ML
INJECTION, SOLUTION INTRAVENOUS CONTINUOUS
Status: DISCONTINUED | OUTPATIENT
Start: 2022-10-18 | End: 2022-10-21

## 2022-10-18 RX ORDER — SODIUM CHLORIDE 0.9 % (FLUSH) 0.9 %
5-40 SYRINGE (ML) INJECTION PRN
Status: DISCONTINUED | OUTPATIENT
Start: 2022-10-18 | End: 2022-10-24 | Stop reason: HOSPADM

## 2022-10-18 RX ORDER — SODIUM CHLORIDE 9 MG/ML
INJECTION, SOLUTION INTRAVENOUS PRN
Status: DISCONTINUED | OUTPATIENT
Start: 2022-10-18 | End: 2022-10-24 | Stop reason: HOSPADM

## 2022-10-18 RX ORDER — SODIUM CHLORIDE 0.9 % (FLUSH) 0.9 %
5-40 SYRINGE (ML) INJECTION EVERY 12 HOURS SCHEDULED
Status: DISCONTINUED | OUTPATIENT
Start: 2022-10-18 | End: 2022-10-24 | Stop reason: HOSPADM

## 2022-10-18 RX ADMIN — METOLAZONE 5 MG: 5 TABLET ORAL at 16:03

## 2022-10-18 RX ADMIN — HYDRALAZINE HYDROCHLORIDE 25 MG: 25 TABLET ORAL at 20:42

## 2022-10-18 RX ADMIN — CARVEDILOL 6.25 MG: 6.25 TABLET, FILM COATED ORAL at 20:42

## 2022-10-18 RX ADMIN — TIOTROPIUM BROMIDE INHALATION SPRAY 2 PUFF: 3.12 SPRAY, METERED RESPIRATORY (INHALATION) at 08:22

## 2022-10-18 RX ADMIN — FUROSEMIDE 80 MG: 10 INJECTION, SOLUTION INTRAMUSCULAR; INTRAVENOUS at 16:03

## 2022-10-18 RX ADMIN — GABAPENTIN 300 MG: 300 CAPSULE ORAL at 20:42

## 2022-10-18 RX ADMIN — Medication 400 MG: at 20:42

## 2022-10-18 RX ADMIN — OXYCODONE AND ACETAMINOPHEN 1 TABLET: 5; 325 TABLET ORAL at 03:23

## 2022-10-18 RX ADMIN — ISOSORBIDE MONONITRATE 30 MG: 30 TABLET, EXTENDED RELEASE ORAL at 16:02

## 2022-10-18 RX ADMIN — GABAPENTIN 300 MG: 300 CAPSULE ORAL at 16:03

## 2022-10-18 RX ADMIN — COLLAGENASE SANTYL: 250 OINTMENT TOPICAL at 16:06

## 2022-10-18 RX ADMIN — SODIUM CHLORIDE, PRESERVATIVE FREE 10 ML: 5 INJECTION INTRAVENOUS at 16:04

## 2022-10-18 RX ADMIN — Medication 400 MG: at 16:03

## 2022-10-18 RX ADMIN — FUROSEMIDE 80 MG: 10 INJECTION, SOLUTION INTRAMUSCULAR; INTRAVENOUS at 20:42

## 2022-10-18 RX ADMIN — HYDRALAZINE HYDROCHLORIDE 25 MG: 25 TABLET ORAL at 16:03

## 2022-10-18 RX ADMIN — SODIUM CHLORIDE: 9 INJECTION, SOLUTION INTRAVENOUS at 15:08

## 2022-10-18 RX ADMIN — ATORVASTATIN CALCIUM 40 MG: 40 TABLET, FILM COATED ORAL at 20:42

## 2022-10-18 ASSESSMENT — PAIN SCALES - GENERAL
PAINLEVEL_OUTOF10: 6
PAINLEVEL_OUTOF10: 9

## 2022-10-18 ASSESSMENT — PAIN DESCRIPTION - DESCRIPTORS: DESCRIPTORS: DISCOMFORT

## 2022-10-18 ASSESSMENT — PAIN DESCRIPTION - LOCATION: LOCATION: LEG

## 2022-10-18 ASSESSMENT — PAIN DESCRIPTION - ORIENTATION: ORIENTATION: RIGHT

## 2022-10-18 NOTE — PROGRESS NOTES
CHART REVIEWED  PT EXAMINED  PROCEDURE DW PT  RISKS BENEFITS AND ALERTNATIVES EXPLAINED IN DETAIL  CONSENT OBTANED   Risk of contrast-induced nephropathy discussed with the patient the patient is aware of that and informed that nephrologist told him that he might need dialysis

## 2022-10-18 NOTE — PROGRESS NOTES
Comprehensive Nutrition Assessment    Type and Reason for Visit:  Initial (los 7 d)    Nutrition Recommendations/Plan:   Modify Carb Control 4 to 5 to meet needs for healing/taller stature  Begin wound healing oral nutrition supplement bid     Malnutrition Assessment:  Malnutrition Status:  Insufficient data (10/18/22 8586)    Context:  Chronic Illness       Nutrition Assessment:    Pt admitted with shortness of breath and scrotal and leg swelling. H/O hypertension, hyperlipidemia, CAD, CHF, CKD stage III, and type II DM. Pt NPO in cath lab for angiogram during my visit. Multiple wounds noted. Generally eating well here. Will modify diet some to meet energy needs with wounds/taller stature and add oral supplements for healing. Continue to follow as moderate nutrition risk. Nutrition Related Findings:    BUN 47, Cr 2.8, Glu 174-186, A1c 7.1 Wound Type: Multiple, Diabetic Ulcer       Current Nutrition Intake & Therapies:    Average Meal Intake: %  Average Supplements Intake: None Ordered  ADULT DIET; Regular; 4 carb choices (60 gm/meal); Low Fat/Low Chol/High Fiber/2 gm Na    Anthropometric Measures:  Height: 6' 1\" (185.4 cm)  Ideal Body Weight (IBW): 184 lbs (84 kg)    Admission Body Weight: 274 lb 9.6 oz (124.6 kg)  Current Body Weight: 272 lb 3.2 oz (123.5 kg), 147.9 % IBW.  Weight Source: Bed Scale  Current BMI (kg/m2): 35.9  Usual Body Weight: 250 lb 7 oz (113.6 kg) (1/31/22)  % Weight Change (Calculated): 8.7  Weight Adjustment For: No Adjustment                      Estimated Daily Nutrient Needs:  Energy Requirements Based On: Kcal/kg  Weight Used for Energy Requirements: Ideal  Energy (kcal/day): 0498-7066 (25-30 kcal/kg IBW)  Weight Used for Protein Requirements: Ideal  Protein (g/day): 101-118 (1.2-1.4 g/kg IBW)  Method Used for Fluid Requirements: 1 ml/kcal  Fluid (ml/day): 2000    Nutrition Diagnosis:   Predicted inadequate energy intake related to increase demand for energy/nutrients as evidenced by wounds    Nutrition Interventions:   Food and/or Nutrient Delivery: Modify Current Diet, Start Oral Nutrition Supplement  Nutrition Education/Counseling: No recommendation at this time  Coordination of Nutrition Care: Continue to monitor while inpatient       Goals:     Goals: Meet at least 75% of estimated needs       Nutrition Monitoring and Evaluation:      Food/Nutrient Intake Outcomes: Food and Nutrient Intake, Supplement Intake  Physical Signs/Symptoms Outcomes: Biochemical Data, Fluid Status or Edema, Skin, Weight    Discharge Planning:    Recommend pursue outpatient diabetes education     Chelo Mendoza, [address] [place] [address],   Contact: [phone]

## 2022-10-18 NOTE — OP NOTE
Operative Note      Procedure  Access through the right common femoral artery   #2 crossover to the left side  3.   Selective angiogram of the left lower extremity   #4 selective angiogram of the right lower extremity   #5 angioplasty of the left SFA    Indication  Left lower extremity ulcers and very abnormal Doppler study    After obtaining from consent patient was brought to the Cath Lab the right groin was in situ pressure lidocaine a 4 Vietnamese sheath placed in the right femoral artery under his technique after some difficulty we were able to cross from right to left using the sauce catheter after that we used a Glidewire to cross into the SFA and switched for a long 4 Scarlet Burows sheath and crossed the lesion with a  wire and then angioplasty was performed by 6 oh by 60 with excellent results patient clinically and hemodynamically stable  After that be did a runoff on the right lower extremity using the sheath    Findings the left SFA had a 99% extremity segment disease 0% has it is diffuse disease which does not appear flow-limiting left popliteal is patent the left infrapopliteal vessels there is single-vessel runoff in the left dorsal pulse is about 50% stenosis  The left common iliac has some degree of stenosis which is not hemodynamically significant we also checked hemodynamics as well  The right common iliac is patent the right SFA has a long stented area at the distal end of the stent there is a 80% stenosis  There is diffuse disease in the infrapopliteal vessels    Assessment and plan  Successful PTA of the left SFA with the interphalangeal procedure percent  We will continue with DAPT  Add to Xarelto 2.5 mg p.o. twice daily per Compass trial if patient can tolerate  Will monitor renal status

## 2022-10-18 NOTE — PROGRESS NOTES
Hospitalist Progress Note      Name:  Michelle Washington /Age/Sex: 1950  (67 y.o. male)   MRN & CSN:  [id number] & [id number] Admission Date/Time: 10/11/2022 10:47 PM   Location:  Cath Lab/NONE PCP: Grace Johnson Day: 8                                               Attending Physician Dr Bernarda Ríos and Plan:   Michelle Washington is a 67 y.o.  male  who presents with Heart failure exacerbated by sotalol [facility])    Present on Admission:   Heart failure exacerbated by sotalol ([facility] [address]   CHF (congestive heart failure), NYHA class I, acute on chronic, combined ([facility] [address] [facility]   Precordial pain   Diabetic ulcer of right foot due to type 2 diabetes mellitus ([facility] [address] [facility]   PAD (peripheral artery disease) ([facility] [address] [facility]   Biventricular ICD (implantable cardioverter-defibrillator) in place   Acute on chronic diastolic CHF (congestive heart failure) ([facility] [address] [facility]   Leg swelling     Severe PAD  CAD   S/p angiogram and PCI to left SFA   DAPT/Imdur   Long term AC-Xarelto    Acute decompensated HFrEF- s/p AICD    Continue Lasix/Ranexa    Strict I&O -850 ml op overnight   Cardiology following   Continue Lasix/Zaroxolyn    CKD stage 4 baseline (1.7-2.6)  sCr 2.8 improved today   Nephrology following   Repeat labs in a.m. is received contrast today during angiogram    Bilateral LE Wounds. POA   General surgery following     Wound consult     Chronic respiratory failure with hypoxia 2/2 COPD  no exacerbation  3 L NC home oxygen, baseline   Continue bronchodilators/pulmonary hygiene    DMII with chronic complications and with long term use of insulin.  Last A1C 7.1 (10/12/22)   Monitor FSBS and cover with medium dose SSI   Hold PO medications while inpatient   Continue gabapentin    Diet Diet NPO   DVT Prophylaxis [x] Lovenox, []  Heparin, [] SCDs, [] Ambulation   GI Prophylaxis [x] PPI,  [] H2 Blocker,  [] Carafate,  [] Diet/Tube Feeds   Code Status Full Code     -Patient assessment and plan discussed with supervising physician-  Subjective 10/18/2022     Naseem Levin is a 67 y.o.  male, who presented with  Leg Swelling (B/L)          Ten point ROS reviewed negative, unless as noted above    Objective: Intake/Output Summary (Last 24 hours) at 10/18/2022 1228  Last data filed at 10/18/2022 0524  Gross per 24 hour   Intake --   Output 850 ml   Net -850 ml      Vitals:   Vitals:    10/18/22 1045 10/18/22 1047 10/18/22 1100 10/18/22 1130   BP: (!) 158/66 (!) 158/66 (!) 180/66 (!) 155/75   Pulse: 61 60 60 61   Resp: 16 11 11 (!) 9   Temp:       TempSrc:       SpO2: 95% 94% 98% 99%   Weight:       Height:         Physical Exam: 10/18/22     GEN -Awake nontoxic appearing male, sitting upright in bed , NAD. Chronically ill body habitus. Appears given age. EYES -Pupils are equally round. No scleral erythema, discharge, or conjunctivitis. HENT -MM are moist. Oral pharynx without exudates, no evidence of thrush. NECK -Supple, no apparent thyromegaly or masses. RESP -CTA, no wheezes, rales or rhonchi. Symmetric chest movement while on supplemental oxygen. C/V -S1/S2 auscultated. RRR +murmur. No JVD or carotid bruits. Peripheral pulses equal bilaterally and palpable. + peripheral edema. GI -Abdomen is soft non distended and without significant tenderness. No masses or guarding. + BS Rectal exam deferred.  -No CVA tenderness. Fuller catheter is not present. LYMPH-No palpable cervical lymphadenopathy and no hepatosplenomegaly. No petechiae or ecchymoses. MS -No gross joint deformities. SKIN -Normal coloration, warm, dry. Bilateral lower extremity foot wounds dressings CDI. Chronic skin changes consistent with venous stasis dermatitis  NEURO-Cranial nerves appear grossly intact, normal speech, no lateralizing weakness. PSYC-Awake, alert, oriented x 4. Appropriate affect.     Medications:   Medications:    furosemide  80 mg IntraVENous TID    metOLazone  5 mg Oral Daily    magnesium oxide  400 mg Oral BID    carvedilol 6.25 mg Oral BID    hydrALAZINE  25 mg Oral 3 times per day    isosorbide mononitrate  30 mg Oral Daily    lidocaine PF  5 mL IntraDERmal Once    sodium chloride flush  5-40 mL IntraVENous 2 times per day    atorvastatin  40 mg Oral Nightly    clopidogrel  75 mg Oral Daily    gabapentin  300 mg Oral TID    metaxalone  800 mg Oral TID    tiotropium  2 puff Inhalation Daily    sodium chloride flush  5-40 mL IntraVENous 2 times per day    enoxaparin  30 mg SubCUTAneous BID    insulin lispro  0-4 Units SubCUTAneous TID WC    insulin lispro  0-4 Units SubCUTAneous Nightly    collagenase   Topical Daily      Infusions:    sodium chloride      sodium chloride      dextrose       PRN Meds: sodium chloride flush, 5-40 mL, PRN  sodium chloride, , PRN  albuterol sulfate HFA, 2 puff, Q4H PRN  oxyCODONE-acetaminophen, 1 tablet, BID PRN  sodium chloride flush, 5-40 mL, PRN  sodium chloride, , PRN  acetaminophen, 650 mg, Q4H PRN  ondansetron, 4 mg, Q8H PRN   Or  ondansetron, 4 mg, Q6H PRN  glucose, 4 tablet, PRN  dextrose bolus, 125 mL, PRN   Or  dextrose bolus, 250 mL, PRN  glucagon (rDNA), 1 mg, PRN  dextrose, , Continuous PRN        LABS  CBC   Recent Labs     10/18/22  0419   WBC 6.3   HGB 8.2*   HCT 26.8*         RENAL  Recent Labs     10/17/22  0650 10/18/22  0419    136   K 4.5 4.6    99   CO2 28 28   BUN 46* 47*   CREATININE 3.0* 2.8*     LFT'S  Recent Labs     10/17/22  0650 10/18/22  0419   AST 9* 9*   ALT 6* <5*   BILITOT 0.8 0.8   ALKPHOS 69 69     COAG  No results for input(s): INR in the last 72 hours. CARDIAC ENZYMES  No results for input(s): CKTOTAL, CKMB, CKMBINDEX, TROPONINI in the last 72 hours. Radiology this visit:  Reviewed.     XR CHEST PORTABLE    Result Date: 10/16/2022  EXAMINATION: ONE XRAY VIEW OF THE CHEST 10/16/2022 11:06 am COMPARISON: 10/11/2022 HISTORY: ORDERING SYSTEM PROVIDED HISTORY: Follow-up on pulmonary edema TECHNOLOGIST PROVIDED HISTORY: Reason for exam:->Follow-up on pulmonary edema Reason for Exam: Follow-up on pulmonary edema FINDINGS: Transvenous pacer remains in place. Cardiomegaly. Pulmonary vascular congestion, pulmonary edema, right pleural effusion. Findings suggest congestive heart failure     XR CHEST PORTABLE    Result Date: 10/11/2022  EXAMINATION: ONE XRAY VIEW OF THE CHEST 10/11/2022 11:22 pm COMPARISON: June 22, 2022 HISTORY: ORDERING SYSTEM PROVIDED HISTORY: chest pain TECHNOLOGIST PROVIDED HISTORY: Reason for exam:->chest pain Reason for Exam: chest pain FINDINGS: There is mild pulmonary vascular congestion. No effusion is identified. The heart size is normal.  A pulse generator is present over the left anterior chest wall with 2 leads projecting over the heart. Mild pulmonary vascular congestion. VL DUP LOWER EXTREMITY ARTERIES BILATERAL    Result Date: 10/14/2022  EXAMINATION: ARTERIAL DUPLEX ULTRASOUND OF THE BILATERAL LOWER EXTREMITIES  10/13/2022 8:49 pm TECHNIQUE: Duplex ultrasound using B-mode/gray scaled imaging, Doppler spectral analysis and color flow Doppler was obtained of the bilateral lower extremities. COMPARISON: None. HISTORY: ORDERING SYSTEM PROVIDED HISTORY: PVD/ non healing ulcers TECHNOLOGIST PROVIDED HISTORY: Reason for exam:->PVD/ non healing ulcers FINDINGS: Exam was extremely difficult due to lack of cooperation with the patient and extreme skin thickness and hardening in the bilateral calves. There is edema in the bilateral lower extremities. There is atherosclerosis in the visualized arteries and there is an enlarged left groin lymph node measuring 1.4 x 2.4 x 2.7 cm in size. These are not significantly changed when compared to prior CT abdomen pelvis from 01/24/2022. There are grossly triphasic waveforms in the right leg from the common femoral artery to the popliteal artery and there are monophasic waveforms below the knee in the anterior tibial and posterior tibial arteries.   There are grossly triphasic waveforms in the left common femoral and superficial femoral arteries. There are grossly monophasic waveforms in the popliteal artery and proximal anterior tibial and peroneal arteries which are not visualized distally. Flow velocities were measured as follows: Com. Fem. 175 cm/sec Prof.           143 cm/sec SFA Prox. 179 cm/sec SFA Mid.     105 cm/sec SFA Dist.     73 cm/sec Pop.           72 cm/sec PTA           68 cm/sec Peron. 25 cm/sec TE           not visualized cm/sec Left: Flow velocities were measured as follows: Com. Fem. 61 cm/sec Prof.           118 cm/sec SFA Prox. 70 cm/sec SFA Mid.     49 cm/sec SFA Dist.     75 cm/sec Pop.           33 cm/sec PTA           41 proximally cm/sec Peron. 41 proximally cm/sec TE           not visualized cm/sec     Abnormal waveforms in the left leg as described without visualization of the peroneal artery or distal anterior posterior tibial arteries suspicious for severe peripheral vascular disease. Conventional arteriography is recommended for further evaluation. Tardus parvus waveforms in the right leg below the knee in the anterior and posterior tibial arteries with nonvisualization of the peroneal artery.              Electronically signed by MICKY Obrien CNP on 10/18/2022 at 12:28 PM

## 2022-10-18 NOTE — PROGRESS NOTES
Patient transported to [address] [place] [address] per 2 RNs on cardiac monitor. Report to be given at bedside to RN. Patient belongings at bedside.

## 2022-10-18 NOTE — FLOWSHEET NOTE
.  #4F RFA sheath pulled at this time per IFEOMA Pereyra and manual pressure initiated and held x10 minutes until hemostasis achieved

## 2022-10-18 NOTE — PROGRESS NOTES
Nephrology Progress Note  10/18/2022 12:58 PM        Subjective:   Admit Date: 10/11/2022  PCP: Jesus Veronica History:  patient seen in early morning, this is a late entry   by the time I am finishing up the note looks like he went for peripheral angiogram    Diet:  he was kept n.p.o.    ROS:   started making urine with high-dose of loop and thiazide   no overt PND, shortness of breath or orthopnea   urine output recorded 150 cc for the last shift   no fever and acceptable       Data:     Current meds:    furosemide  80 mg IntraVENous TID    metOLazone  5 mg Oral Daily    magnesium oxide  400 mg Oral BID    carvedilol  6.25 mg Oral BID    hydrALAZINE  25 mg Oral 3 times per day    isosorbide mononitrate  30 mg Oral Daily    lidocaine PF  5 mL IntraDERmal Once    sodium chloride flush  5-40 mL IntraVENous 2 times per day    atorvastatin  40 mg Oral Nightly    clopidogrel  75 mg Oral Daily    gabapentin  300 mg Oral TID    metaxalone  800 mg Oral TID    tiotropium  2 puff Inhalation Daily    sodium chloride flush  5-40 mL IntraVENous 2 times per day    enoxaparin  30 mg SubCUTAneous BID    insulin lispro  0-4 Units SubCUTAneous TID WC    insulin lispro  0-4 Units SubCUTAneous Nightly    collagenase   Topical Daily      sodium chloride      sodium chloride      dextrose           I/O last 3 completed shifts: In: 7356 [P.O.:240;  I.V.:1111]  Out: 850 [Urine:850]    CBC:   Recent Labs     10/18/22  0419   WBC 6.3   HGB 8.2*             Recent Labs     10/17/22  0650 10/18/22  0419    136   K 4.5 4.6    99   CO2 28 28   BUN 46* 47*   CREATININE 3.0* 2.8*   GLUCOSE 190* 186*       Lab Results   Component Value Date    CALCIUM 8.1 (L) 10/18/2022    PHOS 3.5 10/14/2022       Objective:     Vitals: BP (!) 166/59   Pulse 61   Temp 97.6 °F (36.4 °C) (Oral)   Resp 18   Ht 6' 1\" (1.854 m)   Wt 272 lb 3.2 oz (123.5 kg)   SpO2 (!) 88%   BMI 35.91 kg/m² ,    General appearance:   alert, awake and oriented  HEENT:   at least 2+ conjunctival pallor  Neck:   supple  Lungs:   no gross crackles  Heart:   regular rate and rhythm, chest wall implanted cardiac device  Abdomen:  soft  Extremities:   less bilateral thigh edema      Problem List :         Impression :      acute kidney injury with underlying CKD stage G4 A1- mainly from cardiorenal syndrome- he needed his peripheral angiogram to salvage his leg- Mr. Jaguar Kuo understand that it can affect his kidney function- but it is not necessary evil   acute decompensated heart failure   severe underlying atherosclerotic cardiovascular disease- and aftermath of it   high risk for skin/soft tissue and bone infection    Recommendation/Plan  :      he is having his angiogram done today hopefully we can salvage his leg   will watch for any kidney adverse effect   I had some talk with Mr. Mcguire- if despite of  all possible intervention- his kidney function still may worsen- and he may need kidney replacement therapy   he understands it and accepted   will follow clinically      Ivan Quinones MD MD

## 2022-10-19 LAB
ALBUMIN SERPL-MCNC: 2.8 GM/DL (ref 3.4–5)
ALP BLD-CCNC: 78 IU/L (ref 40–128)
ALT SERPL-CCNC: <5 U/L (ref 10–40)
ANION GAP SERPL CALCULATED.3IONS-SCNC: 5 MMOL/L (ref 4–16)
AST SERPL-CCNC: 9 IU/L (ref 15–37)
BILIRUB SERPL-MCNC: 0.7 MG/DL (ref 0–1)
BUN BLDV-MCNC: 53 MG/DL (ref 6–23)
CALCIUM SERPL-MCNC: 8.2 MG/DL (ref 8.3–10.6)
CHLORIDE BLD-SCNC: 101 MMOL/L (ref 99–110)
CO2: 31 MMOL/L (ref 21–32)
CREAT SERPL-MCNC: 2.6 MG/DL (ref 0.9–1.3)
GFR SERPL CREATININE-BSD FRML MDRD: 25 ML/MIN/1.73M2
GLUCOSE BLD-MCNC: 134 MG/DL (ref 70–99)
GLUCOSE BLD-MCNC: 140 MG/DL (ref 70–99)
GLUCOSE BLD-MCNC: 147 MG/DL (ref 70–99)
GLUCOSE BLD-MCNC: 167 MG/DL (ref 70–99)
GLUCOSE BLD-MCNC: 225 MG/DL (ref 70–99)
HCT VFR BLD CALC: 28.7 % (ref 42–52)
HEMOGLOBIN: 8.7 GM/DL (ref 13.5–18)
MCH RBC QN AUTO: 27.1 PG (ref 27–31)
MCHC RBC AUTO-ENTMCNC: 30.3 % (ref 32–36)
MCV RBC AUTO: 89.4 FL (ref 78–100)
PDW BLD-RTO: 18 % (ref 11.7–14.9)
PLATELET # BLD: 230 K/CU MM (ref 140–440)
PMV BLD AUTO: 9.3 FL (ref 7.5–11.1)
POTASSIUM SERPL-SCNC: 4.8 MMOL/L (ref 3.5–5.1)
RBC # BLD: 3.21 M/CU MM (ref 4.6–6.2)
SODIUM BLD-SCNC: 137 MMOL/L (ref 135–145)
TOTAL PROTEIN: 6 GM/DL (ref 6.4–8.2)
WBC # BLD: 5.7 K/CU MM (ref 4–10.5)

## 2022-10-19 PROCEDURE — 6360000002 HC RX W HCPCS: Performed by: INTERNAL MEDICINE

## 2022-10-19 PROCEDURE — 80048 BASIC METABOLIC PNL TOTAL CA: CPT

## 2022-10-19 PROCEDURE — 82962 GLUCOSE BLOOD TEST: CPT

## 2022-10-19 PROCEDURE — 99233 SBSQ HOSP IP/OBS HIGH 50: CPT | Performed by: INTERNAL MEDICINE

## 2022-10-19 PROCEDURE — 2580000003 HC RX 258: Performed by: INTERNAL MEDICINE

## 2022-10-19 PROCEDURE — 6370000000 HC RX 637 (ALT 250 FOR IP): Performed by: INTERNAL MEDICINE

## 2022-10-19 PROCEDURE — 99233 SBSQ HOSP IP/OBS HIGH 50: CPT | Performed by: SURGERY

## 2022-10-19 PROCEDURE — 80053 COMPREHEN METABOLIC PANEL: CPT

## 2022-10-19 PROCEDURE — 2140000000 HC CCU INTERMEDIATE R&B

## 2022-10-19 PROCEDURE — 36415 COLL VENOUS BLD VENIPUNCTURE: CPT

## 2022-10-19 PROCEDURE — 85027 COMPLETE CBC AUTOMATED: CPT

## 2022-10-19 PROCEDURE — 99213 OFFICE O/P EST LOW 20 MIN: CPT

## 2022-10-19 PROCEDURE — 94640 AIRWAY INHALATION TREATMENT: CPT

## 2022-10-19 PROCEDURE — 94761 N-INVAS EAR/PLS OXIMETRY MLT: CPT

## 2022-10-19 RX ADMIN — ISOSORBIDE MONONITRATE 30 MG: 30 TABLET, EXTENDED RELEASE ORAL at 10:43

## 2022-10-19 RX ADMIN — ATORVASTATIN CALCIUM 40 MG: 40 TABLET, FILM COATED ORAL at 21:31

## 2022-10-19 RX ADMIN — METOLAZONE 5 MG: 5 TABLET ORAL at 10:44

## 2022-10-19 RX ADMIN — ASPIRIN 81 MG CHEWABLE TABLET 81 MG: 81 TABLET CHEWABLE at 10:44

## 2022-10-19 RX ADMIN — COLLAGENASE SANTYL: 250 OINTMENT TOPICAL at 10:03

## 2022-10-19 RX ADMIN — FUROSEMIDE 80 MG: 10 INJECTION, SOLUTION INTRAMUSCULAR; INTRAVENOUS at 10:45

## 2022-10-19 RX ADMIN — FUROSEMIDE 80 MG: 10 INJECTION, SOLUTION INTRAMUSCULAR; INTRAVENOUS at 21:50

## 2022-10-19 RX ADMIN — ENOXAPARIN SODIUM 30 MG: 100 INJECTION SUBCUTANEOUS at 21:31

## 2022-10-19 RX ADMIN — SODIUM CHLORIDE, PRESERVATIVE FREE 10 ML: 5 INJECTION INTRAVENOUS at 21:48

## 2022-10-19 RX ADMIN — GABAPENTIN 300 MG: 300 CAPSULE ORAL at 10:44

## 2022-10-19 RX ADMIN — TIOTROPIUM BROMIDE INHALATION SPRAY 2 PUFF: 3.12 SPRAY, METERED RESPIRATORY (INHALATION) at 07:42

## 2022-10-19 RX ADMIN — Medication 400 MG: at 10:43

## 2022-10-19 RX ADMIN — OXYCODONE AND ACETAMINOPHEN 1 TABLET: 5; 325 TABLET ORAL at 21:39

## 2022-10-19 RX ADMIN — Medication 400 MG: at 21:31

## 2022-10-19 RX ADMIN — ONDANSETRON 4 MG: 4 TABLET, ORALLY DISINTEGRATING ORAL at 21:40

## 2022-10-19 RX ADMIN — SODIUM CHLORIDE, PRESERVATIVE FREE 10 ML: 5 INJECTION INTRAVENOUS at 21:47

## 2022-10-19 RX ADMIN — FUROSEMIDE 80 MG: 10 INJECTION, SOLUTION INTRAMUSCULAR; INTRAVENOUS at 16:41

## 2022-10-19 RX ADMIN — ENOXAPARIN SODIUM 30 MG: 100 INJECTION SUBCUTANEOUS at 10:49

## 2022-10-19 RX ADMIN — INSULIN LISPRO 1 UNITS: 100 INJECTION, SOLUTION INTRAVENOUS; SUBCUTANEOUS at 16:48

## 2022-10-19 RX ADMIN — CARVEDILOL 6.25 MG: 6.25 TABLET, FILM COATED ORAL at 10:45

## 2022-10-19 RX ADMIN — HYDRALAZINE HYDROCHLORIDE 25 MG: 25 TABLET ORAL at 16:41

## 2022-10-19 RX ADMIN — CLOPIDOGREL BISULFATE 75 MG: 75 TABLET ORAL at 10:31

## 2022-10-19 RX ADMIN — CARVEDILOL 6.25 MG: 6.25 TABLET, FILM COATED ORAL at 21:31

## 2022-10-19 RX ADMIN — OXYCODONE AND ACETAMINOPHEN 1 TABLET: 5; 325 TABLET ORAL at 01:31

## 2022-10-19 RX ADMIN — HYDRALAZINE HYDROCHLORIDE 25 MG: 25 TABLET ORAL at 21:31

## 2022-10-19 ASSESSMENT — ENCOUNTER SYMPTOMS
COLOR CHANGE: 0
ANAL BLEEDING: 0
SORE THROAT: 0
BACK PAIN: 0
SHORTNESS OF BREATH: 1
CHOKING: 0
EYE REDNESS: 0
CONSTIPATION: 0
APNEA: 0
PHOTOPHOBIA: 0
EYE ITCHING: 0
STRIDOR: 0
RECTAL PAIN: 0

## 2022-10-19 ASSESSMENT — PAIN - FUNCTIONAL ASSESSMENT: PAIN_FUNCTIONAL_ASSESSMENT: ACTIVITIES ARE NOT PREVENTED

## 2022-10-19 ASSESSMENT — PAIN DESCRIPTION - LOCATION
LOCATION: COCCYX
LOCATION: FOOT

## 2022-10-19 ASSESSMENT — PAIN SCALES - GENERAL
PAINLEVEL_OUTOF10: 7
PAINLEVEL_OUTOF10: 0
PAINLEVEL_OUTOF10: 6
PAINLEVEL_OUTOF10: 10
PAINLEVEL_OUTOF10: 0

## 2022-10-19 ASSESSMENT — PAIN DESCRIPTION - DESCRIPTORS: DESCRIPTORS: BURNING;DISCOMFORT;ACHING

## 2022-10-19 ASSESSMENT — PAIN DESCRIPTION - ORIENTATION: ORIENTATION: RIGHT

## 2022-10-19 NOTE — PROGRESS NOTES
Hospitalist Progress Note      Name:  José Luis Hoffmann /Age/Sex: 1950  (67 y.o. male)   MRN & CSN:  [id number] & [id number] Admission Date/Time: 10/11/2022 10:47 PM   Location:  [id number] PCP: Connie Briones Day: 9                                               Attending Physician Dr Babak [address] and Plan:   José Luis Hoffmann is a 67 y.o.  male  who presents with Heart failure exacerbated by sotalol [facility])    Present on Admission:   Heart failure exacerbated by sotalol ([facility]   CHF (congestive heart failure), NYHA class I, acute on chronic, combined ([facility]   Precordial pain   Diabetic ulcer of right foot due to type 2 diabetes mellitus ([facility]   PAD (peripheral artery disease) ([facility]   Biventricular ICD (implantable cardioverter-defibrillator) in place   Acute on chronic diastolic CHF (congestive heart failure) ([facility]   Leg swelling     Severe PAD  CAD   S/p angiogram and PCI to left SFA (10/18/2022)   Pending right SFA intervention tomorrow   DAPT/Imdur   Long term AC-Xarelto   N.p.o. at midnight    Acute decompensated HFrEF-s/p AICD-pending interrogation   Cardiology following  Strict I&O's  Continue Lasix 80 mg 3 times daily/Zaroxolyn  PT/OT recommending SNF but patient refusing    CKD stage 4 baseline (1.7-2.6)  sCr 2.6 downward trend   Nephrology following   Trend labs    Bilateral LE Wounds. POA   General surgery following    Wound team following with dressing change today    Chronic respiratory failure with hypoxia 2/2 COPD  no exacerbation  3 L NC home oxygen, baseline   Continue bronchodilators/pulmonary hygiene    DMII with chronic complications and with long term use of insulin. Last A1C 7.1 (10/12/22)   Monitor FSBS and cover with medium dose SSI   Hold PO medications while inpatient   Continue gabapentin    Diet ADULT DIET; Regular; 5 carb choices (75 gm/meal); Low Fat/Low Chol/High Fiber/2 gm Na  ADULT ORAL NUTRITION SUPPLEMENT; Breakfast, Dinner;  Wound Healing Oral Supplement   DVT Prophylaxis [x] Lovenox, []  Heparin, [] SCDs, [] Ambulation   GI Prophylaxis [x] PPI,  [] H2 Blocker,  [] Carafate,  [] Diet/Tube Feeds   Code Status Full Code     -Patient assessment and plan discussed with supervising physician-  Subjective 10/19/2022     Yoseph Liang is a 67 y.o.  male, who presented with  Leg Swelling (B/L)    Patient seen examined at bedside. He does report tenesmus concern for constipation vs hemorrhoid flare    Ten point ROS reviewed negative, unless as noted above    Objective: Intake/Output Summary (Last 24 hours) at 10/19/2022 1248  Last data filed at 10/19/2022 0743  Gross per 24 hour   Intake 480 ml   Output --   Net 480 ml        Vitals:   Vitals:    10/19/22 0430 10/19/22 0600 10/19/22 0800 10/19/22 1222   BP: 135/63 133/64 117/69 (!) 151/79   Pulse: 61 60 61    Resp: 10 (!) 9 17 12   Temp:   97.8 °F (36.6 °C) 97.8 °F (36.6 °C)   TempSrc:   Oral Oral   SpO2:    100%   Weight:  266 lb 0.5 oz (120.7 kg)     Height:         Physical Exam: 10/19/22     GEN -Awake nontoxic appearing male, sitting upright in bed , NAD. Chronically ill body habitus. Appears given age. EYES -Pupils are equally round. No scleral erythema, discharge, or conjunctivitis. HENT -MM are moist. Oral pharynx without exudates, no evidence of thrush. NECK -Supple, no apparent thyromegaly or masses. RESP -CTA, no wheezes, rales or rhonchi. Symmetric chest movement while on supplemental oxygen. C/V -S1/S2 auscultated. RRR +murmur. No JVD or carotid bruits. Peripheral pulses equal bilaterally and palpable. + peripheral edema. GI -Abdomen is soft non distended and without significant tenderness. No masses or guarding. + BS Rectal exam deferred.  -No CVA tenderness. Fuller catheter is not present. LYMPH-No palpable cervical lymphadenopathy and no hepatosplenomegaly. No petechiae or ecchymoses. MS -No gross joint deformities. SKIN -Normal coloration, warm, dry.   Bilateral lower extremity foot wounds dressings CDI. Chronic skin changes consistent with venous stasis dermatitis  NEURO-Cranial nerves appear grossly intact, normal speech, flight of ideas/tangential communication. no lateralizing weakness. PSYC-Awake, alert, oriented x 4. Appropriate affect.     Medications:   Medications:    sodium chloride flush  5-40 mL IntraVENous 2 times per day    aspirin  81 mg Oral Daily    clopidogrel  75 mg Oral Daily    furosemide  80 mg IntraVENous TID    metOLazone  5 mg Oral Daily    magnesium oxide  400 mg Oral BID    carvedilol  6.25 mg Oral BID    hydrALAZINE  25 mg Oral 3 times per day    isosorbide mononitrate  30 mg Oral Daily    lidocaine PF  5 mL IntraDERmal Once    sodium chloride flush  5-40 mL IntraVENous 2 times per day    atorvastatin  40 mg Oral Nightly    [Held by provider] metaxalone  800 mg Oral TID    tiotropium  2 puff Inhalation Daily    sodium chloride flush  5-40 mL IntraVENous 2 times per day    enoxaparin  30 mg SubCUTAneous BID    insulin lispro  0-4 Units SubCUTAneous TID WC    insulin lispro  0-4 Units SubCUTAneous Nightly    collagenase   Topical Daily      Infusions:    sodium chloride 75 mL/hr at 10/18/22 1508    sodium chloride      sodium chloride      sodium chloride      dextrose       PRN Meds: sodium chloride flush, 5-40 mL, PRN  sodium chloride, , PRN  acetaminophen, 650 mg, Q4H PRN  sodium chloride flush, 5-40 mL, PRN  sodium chloride, , PRN  albuterol sulfate HFA, 2 puff, Q4H PRN  oxyCODONE-acetaminophen, 1 tablet, BID PRN  sodium chloride flush, 5-40 mL, PRN  sodium chloride, , PRN  ondansetron, 4 mg, Q8H PRN   Or  ondansetron, 4 mg, Q6H PRN  glucose, 4 tablet, PRN  dextrose bolus, 125 mL, PRN   Or  dextrose bolus, 250 mL, PRN  glucagon (rDNA), 1 mg, PRN  dextrose, , Continuous PRN      LABS  CBC   Recent Labs     10/18/22  0419 10/19/22  0649   WBC 6.3 5.7   HGB 8.2* 8.7*   HCT 26.8* 28.7*    230        RENAL  Recent Labs     10/17/22  0650 10/18/22  0419 10/19/22  0649    136 137   K 4.5 4.6 4.8    99 101   CO2 28 28 31   BUN 46* 47* 53*   CREATININE 3.0* 2.8* 2.6*       LFT'S  Recent Labs     10/17/22  0650 10/18/22  0419 10/19/22  0649   AST 9* 9* 9*   ALT 6* <5* <5*   BILITOT 0.8 0.8 0.7   ALKPHOS 69 69 78       Radiology this visit:  Reviewed. XR CHEST PORTABLE    Result Date: 10/16/2022  EXAMINATION: ONE XRAY VIEW OF THE CHEST 10/16/2022 11:06 am COMPARISON: 10/11/2022 HISTORY: ORDERING SYSTEM PROVIDED HISTORY: Follow-up on pulmonary edema TECHNOLOGIST PROVIDED HISTORY: Reason for exam:->Follow-up on pulmonary edema Reason for Exam: Follow-up on pulmonary edema FINDINGS: Transvenous pacer remains in place. Cardiomegaly. Pulmonary vascular congestion, pulmonary edema, right pleural effusion. Findings suggest congestive heart failure     XR CHEST PORTABLE    Result Date: 10/11/2022  EXAMINATION: ONE XRAY VIEW OF THE CHEST 10/11/2022 11:22 pm COMPARISON: June 22, 2022 HISTORY: ORDERING SYSTEM PROVIDED HISTORY: chest pain TECHNOLOGIST PROVIDED HISTORY: Reason for exam:->chest pain Reason for Exam: chest pain FINDINGS: There is mild pulmonary vascular congestion. No effusion is identified. The heart size is normal.  A pulse generator is present over the left anterior chest wall with 2 leads projecting over the heart. Mild pulmonary vascular congestion. VL DUP LOWER EXTREMITY ARTERIES BILATERAL    Result Date: 10/14/2022  EXAMINATION: ARTERIAL DUPLEX ULTRASOUND OF THE BILATERAL LOWER EXTREMITIES  10/13/2022 8:49 pm TECHNIQUE: Duplex ultrasound using B-mode/gray scaled imaging, Doppler spectral analysis and color flow Doppler was obtained of the bilateral lower extremities. COMPARISON: None.  HISTORY: ORDERING SYSTEM PROVIDED HISTORY: PVD/ non healing ulcers TECHNOLOGIST PROVIDED HISTORY: Reason for exam:->PVD/ non healing ulcers FINDINGS: Exam was extremely difficult due to lack of cooperation with the patient and extreme skin thickness and hardening in the bilateral calves. There is edema in the bilateral lower extremities. There is atherosclerosis in the visualized arteries and there is an enlarged left groin lymph node measuring 1.4 x 2.4 x 2.7 cm in size. These are not significantly changed when compared to prior CT abdomen pelvis from 01/24/2022. There are grossly triphasic waveforms in the right leg from the common femoral artery to the popliteal artery and there are monophasic waveforms below the knee in the anterior tibial and posterior tibial arteries. There are grossly triphasic waveforms in the left common femoral and superficial femoral arteries. There are grossly monophasic waveforms in the popliteal artery and proximal anterior tibial and peroneal arteries which are not visualized distally. Flow velocities were measured as follows: Com. Fem. 175 cm/sec Prof.           143 cm/sec SFA Prox. 179 cm/sec SFA Mid.     105 cm/sec SFA Dist.     73 cm/sec Pop.           72 cm/sec PTA           68 cm/sec Peron. 25 cm/sec TE           not visualized cm/sec Left: Flow velocities were measured as follows: Com. Fem. 61 cm/sec Prof.           118 cm/sec SFA Prox. 70 cm/sec SFA Mid.     49 cm/sec SFA Dist.     75 cm/sec Pop.           33 cm/sec PTA           41 proximally cm/sec Peron. 41 proximally cm/sec TE           not visualized cm/sec     Abnormal waveforms in the left leg as described without visualization of the peroneal artery or distal anterior posterior tibial arteries suspicious for severe peripheral vascular disease. Conventional arteriography is recommended for further evaluation. Tardus parvus waveforms in the right leg below the knee in the anterior and posterior tibial arteries with nonvisualization of the peroneal artery.              Electronically signed by MICKY Chaidez CNP on 10/19/2022 at 12:48 PM

## 2022-10-19 NOTE — PROGRESS NOTES
Nephrology Progress Note  10/19/2022 10:36 AM        Subjective:   Admit Date: 10/11/2022  PCP: Carmen Mejía    Interval History: Patient seen in early morning, this is a late entry  He was groggy this morning-apparently did not sleep well last night  Underwent peripheral angiogram  Findings reviewed    Diet: None yet in the morning    ROS: He was groggy this morning-did wake up but did not recognize me-wanted to go back to sleep  I told the nurse to check the blood sugar and keep an eye on him  Urine output was not recorded  No fever recorded and blood pressure acceptable    Data:     Current meds:    sodium chloride flush  5-40 mL IntraVENous 2 times per day    aspirin  81 mg Oral Daily    clopidogrel  75 mg Oral Daily    furosemide  80 mg IntraVENous TID    metOLazone  5 mg Oral Daily    magnesium oxide  400 mg Oral BID    carvedilol  6.25 mg Oral BID    hydrALAZINE  25 mg Oral 3 times per day    isosorbide mononitrate  30 mg Oral Daily    lidocaine PF  5 mL IntraDERmal Once    sodium chloride flush  5-40 mL IntraVENous 2 times per day    atorvastatin  40 mg Oral Nightly    gabapentin  300 mg Oral TID    metaxalone  800 mg Oral TID    tiotropium  2 puff Inhalation Daily    sodium chloride flush  5-40 mL IntraVENous 2 times per day    enoxaparin  30 mg SubCUTAneous BID    insulin lispro  0-4 Units SubCUTAneous TID WC    insulin lispro  0-4 Units SubCUTAneous Nightly    collagenase   Topical Daily      sodium chloride 75 mL/hr at 10/18/22 1508    sodium chloride      sodium chloride      sodium chloride      dextrose           I/O last 3 completed shifts:  In: -   Out: 850 [Urine:850]    CBC:   Recent Labs     10/18/22  0419 10/19/22  0649   WBC 6.3 5.7   HGB 8.2* 8.7*    230          Recent Labs     10/17/22  0650 10/18/22  0419 10/19/22  0649    136 137   K 4.5 4.6 4.8    99 101   CO2 28 28 31   BUN 46* 47* 53*   CREATININE 3.0* 2.8* 2.6*   GLUCOSE 190* 186* 134*       Lab Results Component Value Date    CALCIUM 8.2 (L) 10/19/2022    PHOS 3.5 10/14/2022       Objective:     Vitals: /69   Pulse 61   Temp 97.8 °F (36.6 °C) (Oral)   Resp 17   Ht 6' 1\" (1.854 m)   Wt 266 lb 0.5 oz (120.7 kg)   SpO2 97%   BMI 35.10 kg/m² ,    General appearance: Probably  HEENT: At least 3+ conjunctival pallor  Neck: Supple, with supplemental oxygen per nasal cannula  Lungs: No gross crackles but has bronchial coarse breath sound  Heart: Seemed regular rate and rhythm, left chest wall implanted cardiac device  Abdomen: Soft  Extremities: Less edema      Problem List :         Impression :     Acute kidney injury-on top of CKD stage G4 A1-unknown urine output-fortunately creatinine is downgoing with high-dose diuretics-no ill effect from angiogram or contrast yet-  Acute decompensated heart failure-biventricular failure more right than the left-with diuretics  Severe atherosclerotic cardiovascular disease-status post angiogram-s/p angioplasty of the left superficial femoral artery-  Confusion-he does have risk for stroke particularly with recent angiogram-if his symptoms does not improve need to look into it. Other etiology would be gabapentin toxicity given advanced kidney disease-hold gabapentin.   Also hold Skelaxin    Recommendation/Plan  :     Keep systolic blood pressure more than 120  Okay to keep the diuretics  If mentation does not improve need-further work-up including for stroke-given recent angiogram-his aorta of course very atherosclerotic-he has high risk for cholesterol emboli  Follow clinically and biochemically      Lanre Bassett MD MD No

## 2022-10-19 NOTE — PROGRESS NOTES
General Surgery-[facility] Day: 9    Chief Complaint on Admission: Bilateral lower extremity wounds      Subjective:     Lissett Golden is a 67 y.o. male with bilateral lower extremity wounds with worsening right great toe ulcer. . Patient reports right foot pain. Tolerating ADULT DIET; Regular; 5 carb choices (75 gm/meal); Low Fat/Low Chol/High Fiber/2 gm Na  ADULT ORAL NUTRITION SUPPLEMENT; Breakfast, Dinner; Wound Healing Oral Supplement  Diet NPO Exceptions are: Sips of Water with Meds. + BM.     ROS:  Review of Systems   Constitutional:  Negative for chills and fever. HENT:  Negative for ear pain, mouth sores, sore throat and tinnitus. Eyes:  Negative for photophobia, redness and itching. Respiratory:  Negative for apnea, choking and stridor. Cardiovascular:  Negative for chest pain and palpitations. Gastrointestinal:  Negative for anal bleeding, constipation and rectal pain. Endocrine: Negative for polydipsia. Genitourinary:  Negative for enuresis, flank pain and hematuria. Musculoskeletal:  Negative for back pain, joint swelling and myalgias. Skin:  Positive for wound. Negative for color change and pallor. Allergic/Immunologic: Negative for environmental allergies. Neurological:  Negative for syncope and speech difficulty. Psychiatric/Behavioral:  Negative for confusion and hallucinations.       Allergies  Pcn [penicillins] and Fentanyl          Diagnosis Date    Acid reflux     Acute MI ([address] 2004, 2008    Arthritis     Back    Broken teeth     Upper Front    CAD (coronary artery disease)     Sees Dr. Angeles Marroquin [facility])     per old chart    CHF (congestive heart failure) ([facility]     Chronic back pain     Chronic kidney disease     STAGE 3 KIDNEY FAILURE- \"from my diabetes- do not follow with any one- have seen Dr Enma Mauricio in the past\"    Diabetes mellitus ([facility] Dx 1965    per old chart pt has been diabetic since age 13    Diabetic neuropathy ([address]     \"in my feet\"    H/O cardiovascular stress test 2016    H/O Doppler ultrasound 2018    Moderate disease of the right lower extremity with an JALEN of 0.72. Moderate to severe disease of the left lower extremity with an JALEN of 0.55. 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    Neuropathy     both feet    neuropathy     PAD (peripheral artery disease) ([facility] 2018    PVD (peripheral vascular disease) ([facility]     Sick sinus syndrome (HCC)     Sleep apnea     \"sleep study 3 yrs ago- could not tolerate the cpap made me too dry\"    Spinal stenosis     Teeth missing     Upper And Lower    Type 2 diabetes mellitus without complication ([facility] [facility]     WD-Chronic foot ulcer, left, with necrosis of bone ([facility] [facility] 2021       Objective:     Vitals:    10/19/22 1222   BP: (!) 151/79   Pulse:    Resp: 12   Temp: 97.8 °F (36.6 °C)   SpO2: 100%       TEMPERATURE:  Current - Temp: 97.8 °F (36.6 °C); Max - Temp  Av.4 °F (36.3 °C)  Min: 96.2 °F (35.7 °C)  Max: 97.8 °F (36.6 °C)    I/O this shift: In: 480 [P.O.:480]  Out: 1000 [Urine:1000]I/O last 3 completed shifts:  In: -   Out: 850 [Urine:850]      Physical Exam:  Physical Exam  Constitutional:       Appearance: He is well-developed. HENT:      Head: Normocephalic. Eyes:      Pupils: Pupils are equal, round, and reactive to light. Cardiovascular:      Rate and Rhythm: Normal rate. Pulmonary:      Effort: Pulmonary effort is normal.   Abdominal:      General: There is no distension. Palpations: Abdomen is soft. There is no mass. Tenderness: There is no abdominal tenderness.  There is no guarding or rebound. Musculoskeletal:         General: Normal range of motion. Cervical back: Normal range of motion and neck supple. Feet:    Skin:     General: Skin is warm. Neurological:      Mental Status: He is alert and oriented to person, place, and time.            Scheduled Meds:   sodium chloride flush  5-40 mL IntraVENous 2 times per day    aspirin  81 mg Oral Daily    clopidogrel  75 mg Oral Daily    furosemide  80 mg IntraVENous TID    metOLazone  5 mg Oral Daily    magnesium oxide  400 mg Oral BID    carvedilol  6.25 mg Oral BID    hydrALAZINE  25 mg Oral 3 times per day    isosorbide mononitrate  30 mg Oral Daily    lidocaine PF  5 mL IntraDERmal Once    sodium chloride flush  5-40 mL IntraVENous 2 times per day    atorvastatin  40 mg Oral Nightly    [Held by provider] metaxalone  800 mg Oral TID    tiotropium  2 puff Inhalation Daily    sodium chloride flush  5-40 mL IntraVENous 2 times per day    enoxaparin  30 mg SubCUTAneous BID    insulin lispro  0-4 Units SubCUTAneous TID WC    insulin lispro  0-4 Units SubCUTAneous Nightly    collagenase   Topical Daily     Continuous Infusions:   sodium chloride 75 mL/hr at 10/18/22 1508    sodium chloride      sodium chloride      sodium chloride      dextrose       PRN Meds:sodium chloride flush, sodium chloride, acetaminophen, sodium chloride flush, sodium chloride, albuterol sulfate HFA, oxyCODONE-acetaminophen, sodium chloride flush, sodium chloride, ondansetron **OR** ondansetron, glucose, dextrose bolus **OR** dextrose bolus, glucagon (rDNA), dextrose      Labs/Imaging Results:   Lab Results   Component Value Date    WBC 5.7 10/19/2022    HGB 8.7 (L) 10/19/2022    HCT 28.7 (L) 10/19/2022    MCV 89.4 10/19/2022     10/19/2022     Lab Results   Component Value Date     10/19/2022    K 4.8 10/19/2022     10/19/2022    CO2 31 10/19/2022    BUN 53 (H) 10/19/2022    CREATININE 2.6 (H) 10/19/2022    GLUCOSE 134 (H) 10/19/2022 CALCIUM 8.2 (L) 10/19/2022    PROT 6.0 (L) 10/19/2022    LABALBU 2.8 (L) 10/19/2022    BILITOT 0.7 10/19/2022    ALKPHOS 78 10/19/2022    AST 9 (L) 10/19/2022    ALT <5 (L) 10/19/2022    LABGLOM 25 (L) 10/19/2022    GFRAA 25 (L) 10/17/2022       Assessment:     Patient Active Problem List:     PAD (peripheral artery disease) ([facility]     Chronic coronary artery disease     Biventricular ICD (implantable cardioverter-defibrillator) in place     Chronic combined systolic and diastolic heart failure ([facility])     Chronic kidney disease, stage III (moderate) ([facility])     Mixed hyperlipidemia     Sick sinus syndrome ([facility])     Type 2 diabetes mellitus with diabetic polyneuropathy ([facility]     Spinal stenosis of lumbar region     Obesity, Class I, BMI 30-34.9     S/P partial colectomy     Tubulovillous adenoma of colon     Microalbuminuria     WD-PVD (peripheral vascular disease) ([facility])     Limb ischemia     Necrotic toes ([facility])     Toe gangrene ([facility])     Diabetic foot infection [facility]     Chronic kidney disease (CKD) stage G3a/A2, moderately decreased glomerular filtration rate (GFR) between 45-59 mL/min/1.73 square meter and albuminuria creatinine ratio between  mg/g ([facility])     Edema     ICD (implantable cardioverter-defibrillator) battery depletion     Hyperkalemia     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[facility])     Ischemia of toe     Acute kidney injury ([facility]     Fluid overload     DM (diabetes mellitus) ([facility])     Precordial pain     Acute chest pain     Unstable angina ([facility])     Chronic kidney disease (CKD) stage G3a/A3, moderately decreased glomerular filtration rate (GFR) between 45-59 mL/min/1.73 square meter and albuminuria creatinine ratio greater than 300 mg/g ([facility])     Cardiomyopathy ([facility])     Diabetic neuropathy ([facility])     HTN (hypertension)     Epigastric pain     Acute on chronic congestive heart failure ([facility])     Leg edema     Acute on chronic systolic CHF (congestive heart failure) ([facility])     Diabetic foot ulcer with osteomyelitis ([facility] Visit for wound check     Moderate malnutrition ([address]     Long term (current) use of antibiotics     WD-Diabetic ulcer of toe of right foot associated with type 2 diabetes mellitus, with fat layer exposed ([address]     Diabetic ulcer of toe associated with type 2 diabetes mellitus, with bone involvement without evidence of necrosis (HCC)     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     Persistent wound pain     Receiving intravenous antibiotic treatment as outpatient     Acute on chronic respiratory failure with hypoxemia ([address]     WD-Chronic foot ulcer, left, with necrosis of bone (HCC)     Acute on chronic HFrEF (heart failure with reduced ejection fraction) (HCC)     Scrotal edema     Hypertensive urgency     Diabetic ulcer of right foot due to type 2 diabetes mellitus ([address]     Cellulitis of foot     Toe osteomyelitis (HCC)     Heart failure exacerbated by sotalol (HCC)     CHF (congestive heart failure), NYHA class I, acute on chronic, combined (HCC)     Acute on chronic diastolic CHF (congestive heart failure) (HCC)     Leg swelling      Plan:       N.p.o. after midnight and plan for right great toe amputation in the morning under MAC.       Lavell Closs, MD

## 2022-10-19 NOTE — PROGRESS NOTES
Today's plan: Patient had left SFA intervention yesterday, will proceed with right SFA intervention tomorrow patient has a stent in-stent restenosis,      Admit Date:  10/11/2022    Subjective:ok      Chief complaints on admission  Chief Complaint   Patient presents with    Leg Swelling     B/L         History of present illness:Anmol is a 67 y. o.year old who  presents with had concerns including Leg Swelling (B/L). Past medical history:    has a past medical history of Acid reflux, Acute MI ([address] Arthritis, Broken teeth, CAD (coronary artery disease), Cardiomyopathy ([address] CHF (congestive heart failure) ([address] Chronic back pain, Chronic kidney disease, Diabetes mellitus ([address] Diabetic neuropathy ([address] H/O cardiovascular stress test, H/O Doppler ultrasound, H/O percutaneous left heart catheterization, History of irregular heartbeat, History of syncope, Hyperlipidemia, Hypertension, Leg swelling, Necrotic toes (HCC), Neuropathy, neuropathy, PAD (peripheral artery disease) ([address] PVD (peripheral vascular disease) ([address] Sick sinus syndrome ([address] Sleep apnea, Spinal stenosis, Teeth missing, Type 2 diabetes mellitus without complication ([address] and WD-Chronic foot ulcer, left, with necrosis of bone ([address] Past surgical history:   has a past surgical history that includes Coronary angioplasty with stent; Dental surgery; Colonoscopy (08/04/2016); pacemaker placement (06/04/2010); vascular surgery; colectomy (Right, 08/26/2016); Toe amputation (Right, 09/12/2017); Toe amputation (Right, 01/09/2018); Cardiac catheterization; Cardiac defibrillator placement (06/04/2010); Coronary angioplasty; Cardiac catheterization (07/14/2017); Cardiac catheterization (11/20/2018); Toe amputation (Left, 12/26/2020); IR TUNNELED CVC PLACE WO SQ PORT/PUMP > 5 YEARS (6/14/2021); and Toe amputation (Left, 7/26/2022). Social History:   reports that he quit smoking about a year ago. His smoking use included cigars and cigarettes.  He started smoking about 42 years ago. He has a 9.00 pack-year smoking history. He has never used smokeless tobacco. He reports current drug use. Drug: Marijuana Daril Eddy). He reports that he does not drink alcohol. Family history:  family history includes Cancer in his brother, father, and son; Diabetes in his brother, mother, and sister; Early Death (age of onset: 62) in his sister; Heart Disease in his brother and sister; High Blood Pressure in his brother, brother, and mother; Neuropathy in his sister; Other in his sister; Stroke in his mother; Vision Loss in his mother. Allergies   Allergen Reactions    Pcn [Penicillins] Hives    Fentanyl Itching         Objective:   /69   Pulse 61   Temp 97.8 °F (36.6 °C) (Oral)   Resp 17   Ht 6' 1\" (1.854 m)   Wt 266 lb 0.5 oz (120.7 kg)   SpO2 97%   BMI 35.10 kg/m²     Intake/Output Summary (Last 24 hours) at 10/19/2022 1110  Last data filed at 10/19/2022 0743  Gross per 24 hour   Intake 480 ml   Output --   Net 480 ml         TELEMETRY:      Physical Exam:  Constitutional:  Well developed, Well nourished, No acute distress, Non-toxic appearance. HENT:  Normocephalic, Atraumatic, Bilateral external ears normal, Oropharynx moist, No oral exudates, Nose normal. Neck- Normal range of motion, No tenderness, Supple, No stridor. Eyes:  PERRL, EOMI, Conjunctiva normal, No discharge. Respiratory:  Normal breath sounds, No respiratory distress, No wheezing, No chest tenderness. ,no use of accessory muscles, diaphragm movement is normal  Cardiovascular: (PMI) apex non displaced,no lifts no thrills, no s3,no s4, Normal heart rate, Normal rhythm, No murmurs, No rubs, No gallops. Carotid arteries pulse and amplitude are normal no bruit, no abdominal bruit noted ( normal abdominal aorta ausculation), femoral arteries pulse and amplitude are normal no bruit, pedal pulses are normal  GI:  Bowel sounds normal, Soft, No tenderness, No masses, No pulsatile masses.    : External genitalia appear normal, No masses or lesions. No discharge. No CVA tenderness. Musculoskeletal:  foot ulcer, dressed , + edema, No tenderness, No cyanosis, No clubbing. Good range of motion in all major joints. No tenderness to palpation or major deformities noted. Back- No tenderness. Integument:  Warm, Dry, No erythema, No rash. Lymphatic:  No lymphadenopathy noted. Neurologic:  Alert & oriented x 3, Normal motor function, Normal sensory function, No focal deficits noted.    Psychiatric:  Affect normal, Judgment normal, Mood normal.     Medications:    sodium chloride flush  5-40 mL IntraVENous 2 times per day    aspirin  81 mg Oral Daily    clopidogrel  75 mg Oral Daily    furosemide  80 mg IntraVENous TID    metOLazone  5 mg Oral Daily    magnesium oxide  400 mg Oral BID    carvedilol  6.25 mg Oral BID    hydrALAZINE  25 mg Oral 3 times per day    isosorbide mononitrate  30 mg Oral Daily    lidocaine PF  5 mL IntraDERmal Once    sodium chloride flush  5-40 mL IntraVENous 2 times per day    atorvastatin  40 mg Oral Nightly    [Held by provider] metaxalone  800 mg Oral TID    tiotropium  2 puff Inhalation Daily    sodium chloride flush  5-40 mL IntraVENous 2 times per day    enoxaparin  30 mg SubCUTAneous BID    insulin lispro  0-4 Units SubCUTAneous TID WC    insulin lispro  0-4 Units SubCUTAneous Nightly    collagenase   Topical Daily      sodium chloride 75 mL/hr at 10/18/22 1508    sodium chloride      sodium chloride      sodium chloride      dextrose       sodium chloride flush, sodium chloride, acetaminophen, sodium chloride flush, sodium chloride, albuterol sulfate HFA, oxyCODONE-acetaminophen, sodium chloride flush, sodium chloride, ondansetron **OR** ondansetron, glucose, dextrose bolus **OR** dextrose bolus, glucagon (rDNA), dextrose    Lab Data:  CBC:   Recent Labs     10/18/22  0419 10/19/22  0649   WBC 6.3 5.7   HGB 8.2* 8.7*   HCT 26.8* 28.7*   MCV 88.4 89.4    230       BMP: Recent Labs     10/17/22  0650 10/18/22  0419 10/19/22  0649    136 137   K 4.5 4.6 4.8    99 101   CO2 28 28 31   BUN 46* 47* 53*   CREATININE 3.0* 2.8* 2.6*       LIVER PROFILE:   Recent Labs     10/17/22  0650 10/18/22  0419 10/19/22  0649   AST 9* 9* 9*   ALT 6* <5* <5*   BILITOT 0.8 0.8 0.7   ALKPHOS 69 69 78       PT/INR: No results for input(s): PROTIME, INR in the last 72 hours. APTT: No results for input(s): APTT in the last 72 hours. BNP:  No results for input(s): BNP in the last 72 hours. TROPONIN: @TROPONINI:3@      Assessment:  67 y. o.year old who is admitted for          Plan:  Biventericular failure: on IV milrinone drip and lasix IV, case discussed with nepthrology today,   Severe PAD and foot ulcer, arterial doppler suggested diminished blood flow to the feet, will proceed with lower extermtiy angiogram, case discussed with  nephrology  CAD AND  cardiomyopathy with CKD: [facility] performed 2018 ,patient stent, will continue medical treatment, has ICD  ICD: will interrogate optivol  Health maintenance: exerise and diet  All labs, medications and tests reviewed, continue all other medications of all above medical condition listed as is.       Mandie Diaz MD, MD 10/19/2022 11:10 AM

## 2022-10-19 NOTE — PROGRESS NOTES
Physical Therapy  Attempted to see pt, pt states he is not up to doing therapy today due to having to go through a right SFA intervention tomorrow and a stent in-stent restenosis,, encouraged ex or transfers but pt declined. Will cont. Dyke Lundborg.  Yeni PTA

## 2022-10-19 NOTE — CONSULTS
[id number] [facility] Wound Ostomy Continence Nurse  Consult Note       Dany Miramontes  AGE: 67 y.o. GENDER: male  : 1950  TODAY'S DATE:  10/19/2022    Subjective:     Reason for CWOCN Evaluation and Assessment: wound reassessment      Dany Miramontes is a 67 y.o. male referred by:   [x] Physician  [] Nursing  [] Other:     Wound Identification:  Wound Type: arterial, diabetic, pressure, non-healing surgical, and MASD  Contributing Factors: edema, diabetes, poor glucose control, chronic pressure, decreased mobility, arterial insufficiency, incontinence of stool, and incontinence of urine        PAST MEDICAL HISTORY        Diagnosis Date    Acid reflux     Acute MI ([facility] ,     Arthritis     Back    Broken teeth     Upper Front    CAD (coronary artery disease)     Sees Dr. Evelyn Nieves [facility])     per old chart    CHF (congestive heart failure) ([facility]     Chronic back pain     Chronic kidney disease     STAGE 3 KIDNEY FAILURE- \"from my diabetes- do not follow with any one- have seen Dr Siva Mahajan in the past\"    Diabetes mellitus ([facility] Dx 1965    per old chart pt has been diabetic since age 13    Diabetic neuropathy ([facility]     \"in my feet\"    H/O cardiovascular stress test 2016    H/O Doppler ultrasound 2018    Moderate disease of the right lower extremity with an JALEN of 0.72. Moderate to severe disease of the left lower extremity with an JALEN of 0.55.     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    Neuropathy     both feet    neuropathy     PAD (peripheral artery disease) ([facility] 2018    PVD (peripheral vascular disease) [facility])     Sick sinus syndrome ([facility]     Sleep apnea     \"sleep study 3 yrs ago- could not tolerate the cpap made me too dry\"    Spinal stenosis     Teeth missing     Upper And Lower    Type 2 diabetes mellitus without complication ([facility]     WD-Chronic foot ulcer, left, with necrosis of bone ([facility] 11/12/2021       PAST SURGICAL HISTORY    Past Surgical History:   Procedure Laterality Date    CARDIAC CATHETERIZATION      per old chart done 10/2014    CARDIAC CATHETERIZATION  07/14/2017    with angiography of leg    CARDIAC CATHETERIZATION  11/20/2018    PATENT STENTS OF ALL THREE MAJOR VESSELS    CARDIAC DEFIBRILLATOR PLACEMENT  06/04/2010    [employer] [address] DR Defibrillator Implanted    COLECTOMY Right 08/26/2016    laparascopic; robotic assisted    COLONOSCOPY  08/04/2016    CORONARY ANGIOPLASTY      \"15 Heart Stents\"    CORONARY ANGIOPLASTY WITH STENT PLACEMENT      per old chart had angio with stent to circumflex and obtuse marginal artery at [facility] 5/2010( old chart also gives hx of stent placement done 2000,2004 and 2005)    DENTAL SURGERY      Teeth Extracted In Past    IR TUNNELED CATHETER PLACEMENT GREATER THAN 5 YEARS  6/14/2021    IR TUNNELED CATHETER PLACEMENT GREATER THAN 5 YEARS 6/14/2021 [facility] SPECIAL PROCEDURES    PACEMAKER PLACEMENT  06/04/2010    [employer] [address] DR Defibrillator Implanted    TOE AMPUTATION Right 09/12/2017    Rt 3rd toe    TOE AMPUTATION Right 01/09/2018     Right 5th toe amputation and Toenails trimmed left 2,3,4 and 5th toes    TOE AMPUTATION Left 12/26/2020    LEFT GREAT TOE AMPUTATION performed by Mic Browne MD at [address] Left 7/26/2022    LEFT SECOND TOE AMPUTATION performed by Nayla Zapata MD at [address]      per old chart had balloon angioplasty right superfical femoral artery,right popliteal artery,,right ant.tibial artery, right tibioperoneal trunk, and right post.tibial artery wna stent placement right popliteal artery and superfical femoral artery 2012       FAMILY HISTORY    Family History   Problem Relation Age of Onset    Diabetes Mother     Stroke Mother     High Blood Pressure Mother     Vision Loss Mother     Cancer Father         Prostate Cancer    Diabetes Sister     Neuropathy Sister     Other Sister         \"Breathing Problems\"    Heart Disease Sister     Early Death Sister 62        Heart Complications    Cancer Brother         \"Stomach Cancer\"    High Blood Pressure Brother     Diabetes Brother     Heart Disease Brother     High Blood Pressure Brother     Cancer Son         \"Testicle Cancer\"       SOCIAL HISTORY    Social History     Tobacco Use    Smoking status: Former     Packs/day: 0.25     Years: 36.00     Pack years: 9.00     Types: Cigars, Cigarettes     Start date: 1980     Quit date: 10/22/2021     Years since quittin.9    Smokeless tobacco: Never    Tobacco comments:     Client states he has stopped smoking   Vaping Use    Vaping Use: Never used   Substance Use Topics    Alcohol use: No    Drug use: Yes     Types: Marijuana (Weed)       ALLERGIES    Allergies   Allergen Reactions    Pcn [Penicillins] Hives    Fentanyl Itching       MEDICATIONS    No current facility-administered medications on file prior to encounter. Current Outpatient Medications on File Prior to Encounter   Medication Sig Dispense Refill    oxyCODONE-acetaminophen (PERCOCET) 5-325 MG per tablet Take 1 tablet by mouth 2 times daily as needed for Pain.       diclofenac sodium (VOLTAREN) 1 % GEL APPLY 4 G TOPICALLY 2 TIMES DAILY 100 g 7    amLODIPine (NORVASC) 10 MG tablet TAKE 1 TABLET BY MOUTH EVERY DAY 90 tablet 3    lisinopril (PRINIVIL;ZESTRIL) 5 MG tablet Take 1 tablet by mouth daily 30 tablet 0    metOLazone (ZAROXOLYN) 2.5 MG tablet Take 1 tablet by mouth daily 30 tablet 0    torsemide (DEMADEX) 20 MG tablet Take 2 tablets by mouth daily 60 tablet 0    gabapentin (NEURONTIN) 300 MG capsule TAKE 1 CAPSULE BY MOUTH 3 TIMES DAILY FOR 90 DAYS.  90 capsule 3    metaxalone (SKELAXIN) 800 MG tablet Take 800 mg by mouth 3 times daily      SPIRIVA HANDIHALER 18 MCG inhalation capsule       insulin lispro, 1 Unit Dial, 100 UNIT/ML SOPN Inject into the skin 2 times daily Sliding scale dose      atorvastatin (LIPITOR) 40 MG tablet Take 1 tablet by mouth nightly 30 tablet 3    carvedilol (COREG) 3.125 MG tablet Take 1 tablet by mouth 2 times daily 60 tablet 3    albuterol sulfate HFA (VENTOLIN HFA) 108 (90 Base) MCG/ACT inhaler Inhale 2 puffs into the lungs every 4 hours as needed for Wheezing 1 Inhaler 3    Calcium Alginate (ALGICELL CALCIUM DRESSING 4\"X8) MISC Apply 1 Device topically daily 30 each 3    PROMOGRAN COREY WOUND DRESSING MATRIX Apply topically Apply to left daily and cover with gauze 1 Package 3    clopidogrel (PLAVIX) 75 MG tablet Take 1 tablet by mouth daily 30 tablet 11         Objective:      /69   Pulse 61   Temp 97.8 °F (36.6 °C) (Oral)   Resp 17   Ht 6' 1\" (1.854 m)   Wt 266 lb 0.5 oz (120.7 kg)   SpO2 97%   BMI 35.10 kg/m²   Pablo Risk Score: Pablo Scale Score: 15    LABS    CBC:   Lab Results   Component Value Date/Time    WBC 5.7 10/19/2022 06:49 AM    RBC 3.21 10/19/2022 06:49 AM    HGB 8.7 10/19/2022 06:49 AM    HCT 28.7 10/19/2022 06:49 AM    MCV 89.4 10/19/2022 06:49 AM    MCH 27.1 10/19/2022 06:49 AM    MCHC 30.3 10/19/2022 06:49 AM    RDW 18.0 10/19/2022 06:49 AM     10/19/2022 06:49 AM    MPV 9.3 10/19/2022 06:49 AM     CMP:    Lab Results   Component Value Date/Time     10/19/2022 06:49 AM    K 4.8 10/19/2022 06:49 AM    K 5.1 01/10/2018 04:32 AM     10/19/2022 06:49 AM    CO2 31 10/19/2022 06:49 AM    BUN 53 10/19/2022 06:49 AM    CREATININE 2.6 10/19/2022 06:49 AM    GFRAA 25 10/17/2022 06:50 AM    LABGLOM 25 10/19/2022 06:49 AM    GLUCOSE 134 10/19/2022 06:49 AM    PROT 6.0 10/19/2022 06:49 AM    PROT 6.3 01/21/2013 12:10 PM    LABALBU 2.8 10/19/2022 06:49 AM    CALCIUM 8.2 10/19/2022 06:49 AM    BILITOT 0.7 10/19/2022 06:49 AM    ALKPHOS 78 10/19/2022 06:49 AM    AST 9 10/19/2022 06:49 AM    ALT <5 10/19/2022 06:49 AM     Albumin:    Lab Results   Component Value Date/Time    LABALBU 2.8 10/19/2022 06:49 AM     PT/INR:    Lab Results   Component Value Date/Time    PROTIME 13.3 08/30/2021 08:40 AM    PROTIME 11.2 09/08/2011 06:02 PM    INR 1.03 08/30/2021 08:40 AM     HgBA1c:    Lab Results   Component Value Date/Time    LABA1C 7.1 10/12/2022 06:28 AM         Assessment:     Patient Active Problem List   Diagnosis    PAD (peripheral artery disease) ([facility])    Chronic coronary artery disease    Biventricular ICD (implantable cardioverter-defibrillator) in place    Chronic combined systolic and diastolic heart failure ([facility])    Chronic kidney disease, stage III (moderate) ([facility])    Mixed hyperlipidemia    Sick sinus syndrome ([facility])    Type 2 diabetes mellitus with diabetic polyneuropathy ([facility])    Spinal stenosis of lumbar region    Obesity, Class I, BMI 30-34.9    S/P partial colectomy    Tubulovillous adenoma of colon    Microalbuminuria    WD-PVD (peripheral vascular disease) ([facility])    Limb ischemia    Necrotic toes ([facility])    Toe gangrene ([facility])    Diabetic foot infection ([facility])    Chronic kidney disease (CKD) stage G3a/A2, moderately decreased glomerular filtration rate (GFR) between 45-59 mL/min/1.73 square meter and albuminuria creatinine ratio between  mg/g ([facility])    Edema    ICD (implantable cardioverter-defibrillator) battery depletion    Hyperkalemia    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[facility])    Ischemia of toe    Acute kidney injury ([facility]    Fluid overload    DM (diabetes mellitus) ([facility])    Precordial pain    Acute chest pain    Unstable angina ([facility])    Chronic kidney disease (CKD) stage G3a/A3, moderately decreased glomerular filtration rate (GFR) between 45-59 mL/min/1.73 square meter and albuminuria creatinine ratio greater than 300 mg/g ([address]    Cardiomyopathy ([address]    Diabetic neuropathy ([facility] HTN (hypertension)    Epigastric pain    Acute on chronic congestive heart failure (HCC)    Leg edema    Acute on chronic systolic CHF (congestive heart failure) ([address]    Diabetic foot ulcer with osteomyelitis ([address]    Visit for wound check    Moderate malnutrition ([address]    Long term (current) use of antibiotics    WD-Diabetic ulcer of toe of right foot associated with type 2 diabetes mellitus, with fat layer exposed ([address]    Diabetic ulcer of toe associated with type 2 diabetes mellitus, with bone involvement without evidence of necrosis ([address]    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    Persistent wound pain    Receiving intravenous antibiotic treatment as outpatient    Acute on chronic respiratory failure with hypoxemia ([address]    WD-Chronic foot ulcer, left, with necrosis of bone ([address]    Acute on chronic HFrEF (heart failure with reduced ejection fraction) ([address]    Scrotal edema    Hypertensive urgency    Diabetic ulcer of right foot due to type 2 diabetes mellitus ([address]    Cellulitis of foot    Toe osteomyelitis ([address]    Heart failure exacerbated by sotalol (HCC)    CHF (congestive heart failure), NYHA class I, acute on chronic, combined (HCC)    Acute on chronic diastolic CHF (congestive heart failure) (HCC)    Leg swelling       Measurements:  Wound 07/06/17 Other (Comment) Toe (Comment  which one) (Active)   Number of days: 1930       Wound 12/10/20 Foot Left;Lateral fluid filled blister (Active)   Number of days: 677       Wound 10/30/21   #1 Left Dorsal Great Toe amp site  (Active)   Number of days: 353       Wound 10/30/21   #2 Left Dorsal Second Toe  (Active)   Number of days: 353       Wound 10/30/21 #3 Right Great Toe (Active)   Number of days: 054       Wound 02/25/22 #7 Right Dorsal 2nd Toe (Active)   Number of days: 235       Wound 07/19/22 Toe (Comment  which one) Anterior;Right #1 GR (Active)   Wound Image   10/19/22 1000   Wound Etiology Diabetic 10/19/22 1121   Dressing Status New dressing applied 10/19/22 1121 Wound Cleansed Cleansed with saline 10/19/22 1121   Dressing/Treatment ABD; Betadine swabs/povidone iodine;Moist to dry; Roll gauze 10/19/22 1121   Wound Length (cm) 4 cm 10/19/22 1000   Wound Width (cm) 4 cm 10/19/22 1000   Wound Depth (cm) 0.3 cm 10/19/22 1000   Wound Surface Area (cm^2) 16 cm^2 10/19/22 1000   Change in Wound Size % (l*w) -419.48 10/19/22 1000   Wound Volume (cm^3) 4.8 cm^3 10/19/22 1000   Wound Healing % -419 10/19/22 1000   Distance Tunneling (cm) 0 cm 10/19/22 1000   Tunneling Position ___ O'Clock 0 10/19/22 1000   Undermining Starts ___ O'Clock 0 10/19/22 1000   Undermining Ends___ O'Clock 0 10/19/22 1000   Undermining Maxium Distance (cm) 0 10/19/22 1000   Wound Assessment Pink/red;Slough;Eschar dry 10/19/22 1121   Drainage Amount Small 10/19/22 1121   Drainage Description Purulent;Yellow 10/19/22 1121   Odor Moderate 10/19/22 1121   Dina-wound Assessment Dry/flaky 10/19/22 1121   Margins Defined edges 10/19/22 1121   Wound Thickness Description not for Pressure Injury Full thickness 10/19/22 1121   Number of days: 91       Wound 07/19/22 Toe (Comment  which one) Anterior; Left #2 GR (Active)   Wound Image   10/19/22 1000   Wound Etiology Non-Healing Surgical 10/19/22 1000   Dressing Status Reinforced dressing 10/19/22 1121   Wound Cleansed Cleansed with saline 10/19/22 1000   Dressing/Treatment ABD; Moist to dry; Pharmaceutical agent (see MAR); Roll gauze 10/19/22 1000   Wound Length (cm) 0.6 cm 10/19/22 1000   Wound Width (cm) 1.5 cm 10/19/22 1000   Wound Depth (cm) 0.4 cm 10/19/22 1000   Wound Surface Area (cm^2) 0.9 cm^2 10/19/22 1000   Change in Wound Size % (l*w) 77.78 10/19/22 1000   Wound Volume (cm^3) 0.36 cm^3 10/19/22 1000   Wound Healing % 11 10/19/22 1000   Distance Tunneling (cm) 0 cm 10/19/22 1000   Tunneling Position ___ O'Clock 0 10/19/22 1000   Undermining Starts ___ O'Clock 0 10/19/22 1000   Undermining Ends___ O'Clock 0 10/19/22 1000   Undermining Maxium Distance (cm) 0 10/19/22 1000   Wound Assessment Pink/red;Slough 10/19/22 1000   Drainage Amount Small 10/19/22 1000   Drainage Description Yellow;Serosanguinous 10/19/22 1000   Odor Mild 10/19/22 1000   Dina-wound Assessment Intact;Dry/flaky 10/19/22 1000   Margins Defined edges 10/19/22 1000   Wound Thickness Description not for Pressure Injury Full thickness 10/19/22 1000   Number of days: 91       Wound 07/19/22 Toe (Comment  which one) Anterior; Left #3 2ND (Active)   Wound Image   10/19/22 1000   Wound Etiology Non-Healing Surgical 10/19/22 1000   Dressing Status Reinforced dressing 10/19/22 1121   Wound Cleansed Cleansed with saline 10/19/22 1000   Dressing/Treatment ABD; Moist to dry; Pharmaceutical agent (see MAR); Roll gauze 10/19/22 1000   Wound Length (cm) 1.7 cm 10/19/22 1000   Wound Width (cm) 1.5 cm 10/19/22 1000   Wound Depth (cm) 1 cm 10/19/22 1000   Wound Surface Area (cm^2) 2.55 cm^2 10/19/22 1000   Change in Wound Size % (l*w) 65.91 10/19/22 1000   Wound Volume (cm^3) 2.55 cm^3 10/19/22 1000   Wound Healing % -14 10/19/22 1000   Distance Tunneling (cm) 0 cm 10/19/22 1000   Tunneling Position ___ O'Clock 0 10/19/22 1000   Undermining Starts ___ O'Clock 0 10/19/22 1000   Undermining Ends___ O'Clock 0 10/19/22 1000   Undermining Maxium Distance (cm) 0 10/19/22 1000   Wound Assessment Pink/red;Slough 10/19/22 1000   Drainage Amount Small 10/19/22 1000   Drainage Description Serosanguinous; Yellow 10/19/22 1000   Odor Mild 10/19/22 1000   Dina-wound Assessment Dry/flaky; Intact 10/19/22 1000   Margins Defined edges 10/19/22 1000   Wound Thickness Description not for Pressure Injury Full thickness 10/19/22 1000   Number of days: 91       Wound 10/12/22 Right;Plantar;Lateral (Active)   Wound Image   10/19/22 1000   Wound Etiology Diabetic 10/19/22 1000   Dressing Status Reinforced dressing 10/19/22 1121   Wound Cleansed Cleansed with saline 10/19/22 1000   Dressing/Treatment ABD; Moist to dry; Pharmaceutical agent (see MAR);Roll gauze 10/19/22 1000   Wound Length (cm) 0.5 cm 10/19/22 1000   Wound Width (cm) 1 cm 10/19/22 1000   Wound Depth (cm) 0.1 cm 10/19/22 1000   Wound Surface Area (cm^2) 0.5 cm^2 10/19/22 1000   Change in Wound Size % (l*w) 58.33 10/19/22 1000   Wound Volume (cm^3) 0.05 cm^3 10/19/22 1000   Wound Healing % 79 10/19/22 1000   Distance Tunneling (cm) 0 cm 10/19/22 1000   Tunneling Position ___ O'Clock 0 10/19/22 1000   Undermining Starts ___ O'Clock 0 10/19/22 1000   Undermining Ends___ O'Clock 0 10/19/22 1000   Undermining Maxium Distance (cm) 0 10/19/22 1000   Wound Assessment Pink/red;Slough;Dry 10/19/22 1000   Drainage Amount None 10/19/22 1000   Drainage Description Serosanguinous; Yellow 10/14/22 2050   Odor None 10/19/22 1000   Dina-wound Assessment Dry/flaky 10/19/22 1000   Margins Defined edges 10/19/22 1000   Wound Thickness Description not for Pressure Injury Full thickness 10/19/22 1000   Number of days: 6       Wound 10/12/22 Scrotum Posterior (Active)   Wound Image   10/12/22 0950   Wound Etiology Other 10/17/22 2129   Dressing Status New dressing applied 10/18/22 2200   Wound Cleansed Cleansed with saline 10/14/22 2050   Dressing/Treatment Moisture barrier 10/17/22 2129   Wound Length (cm) 0 cm 10/19/22 1000   Wound Width (cm) 0 cm 10/19/22 1000   Wound Depth (cm) 0 cm 10/19/22 1000   Wound Surface Area (cm^2) 0 cm^2 10/19/22 1000   Change in Wound Size % (l*w) 100 10/19/22 1000   Wound Volume (cm^3) 0 cm^3 10/19/22 1000   Wound Healing % 100 10/19/22 1000   Distance Tunneling (cm) 0 cm 10/12/22 0950   Tunneling Position ___ O'Clock 0 10/12/22 0950   Undermining Starts ___ O'Clock 0 10/12/22 0950   Undermining Ends___ O'Clock 0 10/12/22 0950   Undermining Maxium Distance (cm) 0 10/12/22 0950   Wound Assessment Pink/red 10/17/22 2129   Drainage Amount Moderate 10/14/22 2050   Drainage Description Serous 10/14/22 2050   Odor None 10/14/22 2050   Dina-wound Assessment Intact;Edematous 10/17/22 1051 Margins Undefined edges 10/14/22 2050   Wound Thickness Description not for Pressure Injury Partial thickness 10/14/22 2050   Number of days: 6       Wound 10/12/22 Ischium Right (Active)   Wound Image   10/12/22 0950   Wound Etiology Other 10/17/22 2129   Dressing Status New dressing applied 10/18/22 2200   Wound Cleansed Cleansed with saline 10/14/22 2050   Dressing/Treatment Moisture barrier 10/17/22 2129   Wound Length (cm) 0 cm 10/19/22 1000   Wound Width (cm) 0 cm 10/19/22 1000   Wound Depth (cm) 0 cm 10/19/22 1000   Wound Surface Area (cm^2) 0 cm^2 10/19/22 1000   Change in Wound Size % (l*w) 100 10/19/22 1000   Wound Volume (cm^3) 0 cm^3 10/19/22 1000   Wound Healing % 100 10/19/22 1000   Distance Tunneling (cm) 0 cm 10/12/22 0950   Tunneling Position ___ O'Clock 0 10/12/22 0950   Undermining Starts ___ O'Clock 0 10/12/22 0950   Undermining Ends___ O'Clock 0 10/12/22 0950   Undermining Maxium Distance (cm) 0 10/12/22 0950   Wound Assessment Pink/red 10/17/22 2129   Drainage Amount Small 10/14/22 2050   Drainage Description Serosanguinous 10/14/22 2050   Odor None 10/14/22 2050   Dina-wound Assessment Intact 10/17/22 2129   Margins Defined edges 10/14/22 2050   Wound Thickness Description not for Pressure Injury Partial thickness 10/14/22 2050   Number of days: 6       Wound 10/19/22 Sacrum (Active)   Wound Image   10/19/22 1000   Wound Etiology Other 10/19/22 1000   Dressing Status New dressing applied 10/19/22 1000   Wound Cleansed Cleansed with saline 10/19/22 1000   Dressing/Treatment Collagen;Silicone border 05/60/89 1000   Wound Length (cm) 1.5 cm 10/19/22 1000   Wound Width (cm) 1.7 cm 10/19/22 1000   Wound Depth (cm) 0.1 cm 10/19/22 1000   Wound Surface Area (cm^2) 2.55 cm^2 10/19/22 1000   Wound Volume (cm^3) 0.255 cm^3 10/19/22 1000   Distance Tunneling (cm) 0 cm 10/19/22 1000   Tunneling Position ___ O'Clock 0 10/19/22 1000   Undermining Starts ___ O'Clock 0 10/19/22 1000   Undermining Ends___ O'Clock 0 10/19/22 1000   Undermining Maxium Distance (cm) 0 10/19/22 1000   Wound Assessment Pink/red 10/19/22 1000   Drainage Amount Scant 10/19/22 1000   Drainage Description Serosanguinous 10/19/22 1000   Odor None 10/19/22 1000   Dina-wound Assessment Maceration 10/19/22 1000   Margins Defined edges 10/19/22 1000   Wound Thickness Description not for Pressure Injury Partial thickness 10/19/22 1000   Number of days: 0       Response to treatment:  Well tolerated by patient. Pain Assessment:  Severity:  mild  Quality of pain: sharp  Wound Pain Timing/Severity: intermittent  Premedicated: no    Plan:     Plan of Care: Wound 10/19/22 Sacrum-Dressing/Treatment: Collagen, Silicone border  Wound 07/19/22 Toe (Comment  which one) Anterior;Right #1 GR-Dressing/Treatment: ABD, Betadine swabs/povidone iodine, Moist to dry, Roll gauze  Wound 07/19/22 Toe (Comment  which one) Anterior; Left #2 GR-Dressing/Treatment: ABD, Moist to dry, Pharmaceutical agent (see MAR), Roll gauze  Wound 07/19/22 Toe (Comment  which one) Anterior; Left #3 2ND-Dressing/Treatment: ABD, Moist to dry, Pharmaceutical agent (see MAR), Roll gauze  Wound 10/12/22 Right;Plantar;Lateral-Dressing/Treatment: ABD, Moist to dry, Pharmaceutical agent (see MAR), Roll gauze  Wound 10/12/22 Scrotum Posterior-Dressing/Treatment: Moisture barrier  Wound 10/12/22 Ischium Right-Dressing/Treatment: Moisture barrier    1000-Pt in bed. Agreeable to wound reassessment. Dr. Gloria Garcia has been following as well. Right great toe with diabetic wound. Foul odor with purulent drainage. Edematous-recommend possible imaging and Dr. Gloria Garcia to reassess. Right lateral foot with stable diabetic wound-mostly dry. Left great and 2nd toe with non healing surgical/diabetic wounds. Mild odor with some slough-recommend continue santyl and NS wet to dry. Heels intact. Right ischium and scrotal wounds appear intact. Scrotum edematous.  Sacrum/intergluteal cleft with denuded open area-probable MARY. Has external male catheter on but pad is soiled and incontinent small stool. Dina care done and pad changed. Applied collagen and silicone foam border to sacrum and moisture barrier to dina rectal and scrotum. Pillows provided. Positioned to right side. Atmos air pump on bed. Updated nurse. 1115-Pt seen with Dr. Starr Martinez. Dressings to feet removed. Evaluated right great toe-will possibly need amputation. Betadine wet to dry applied. Reinforced left foot dressing with new abd/kerlix. Pt tolerated well. Updated nurse. Also per cardiology notes today:Patient had left SFA intervention yesterday, will proceed with right SFA intervention tomorrow patient has a stent in-stent restenosis. Specialty Bed Required : yes  [] Low Air Loss   [x] Pressure Redistribution  [] Fluid Immersion  [] Bariatric  [] Total Pressure Relief  [] Other:     Discharge Plan:  Placement for patient upon discharge: tbd  Hospice Care: no  Patient appropriate for Outpatient [address]: active    Patient/Caregiver Teaching:  Level of patient/caregiver understanding able to:   Needs reinforcement.         Electronically signed by Reggy Boeck, RN, Elsie Castillo on 10/19/2022 at 11:23 AM

## 2022-10-20 ENCOUNTER — ANESTHESIA (OUTPATIENT)
Dept: OPERATING ROOM | Age: 72
DRG: 252 | End: 2022-10-20
Payer: MEDICARE

## 2022-10-20 ENCOUNTER — ANESTHESIA EVENT (OUTPATIENT)
Dept: OPERATING ROOM | Age: 72
DRG: 252 | End: 2022-10-20
Payer: MEDICARE

## 2022-10-20 LAB
ALBUMIN SERPL-MCNC: 2.9 GM/DL (ref 3.4–5)
ALP BLD-CCNC: 70 IU/L (ref 40–129)
ALT SERPL-CCNC: <5 U/L (ref 10–40)
ANION GAP SERPL CALCULATED.3IONS-SCNC: 6 MMOL/L (ref 4–16)
AST SERPL-CCNC: 11 IU/L (ref 15–37)
BILIRUB SERPL-MCNC: 0.8 MG/DL (ref 0–1)
BUN BLDV-MCNC: 58 MG/DL (ref 6–23)
CALCIUM SERPL-MCNC: 8.2 MG/DL (ref 8.3–10.6)
CHLORIDE BLD-SCNC: 100 MMOL/L (ref 99–110)
CO2: 33 MMOL/L (ref 21–32)
CREAT SERPL-MCNC: 2.9 MG/DL (ref 0.9–1.3)
GFR SERPL CREATININE-BSD FRML MDRD: 22 ML/MIN/1.73M2
GLUCOSE BLD-MCNC: 110 MG/DL (ref 70–99)
GLUCOSE BLD-MCNC: 114 MG/DL (ref 70–99)
GLUCOSE BLD-MCNC: 141 MG/DL (ref 70–99)
GLUCOSE BLD-MCNC: 203 MG/DL (ref 70–99)
GLUCOSE BLD-MCNC: 248 MG/DL (ref 70–99)
HCT VFR BLD CALC: 29.2 % (ref 42–52)
HEMOGLOBIN: 8.7 GM/DL (ref 13.5–18)
MCH RBC QN AUTO: 27.3 PG (ref 27–31)
MCHC RBC AUTO-ENTMCNC: 29.8 % (ref 32–36)
MCV RBC AUTO: 91.5 FL (ref 78–100)
PDW BLD-RTO: 18.2 % (ref 11.7–14.9)
PLATELET # BLD: 229 K/CU MM (ref 140–440)
PMV BLD AUTO: 9.3 FL (ref 7.5–11.1)
POTASSIUM SERPL-SCNC: 5 MMOL/L (ref 3.5–5.1)
RBC # BLD: 3.19 M/CU MM (ref 4.6–6.2)
SODIUM BLD-SCNC: 139 MMOL/L (ref 135–145)
TOTAL PROTEIN: 6.1 GM/DL (ref 6.4–8.2)
WBC # BLD: 7 K/CU MM (ref 4–10.5)

## 2022-10-20 PROCEDURE — 3600000012 HC SURGERY LEVEL 2 ADDTL 15MIN: Performed by: SURGERY

## 2022-10-20 PROCEDURE — 6370000000 HC RX 637 (ALT 250 FOR IP): Performed by: SURGERY

## 2022-10-20 PROCEDURE — 3700000000 HC ANESTHESIA ATTENDED CARE: Performed by: SURGERY

## 2022-10-20 PROCEDURE — 6360000002 HC RX W HCPCS: Performed by: SURGERY

## 2022-10-20 PROCEDURE — 28810 AMPUTATION TOE & METATARSAL: CPT | Performed by: SURGERY

## 2022-10-20 PROCEDURE — 94761 N-INVAS EAR/PLS OXIMETRY MLT: CPT

## 2022-10-20 PROCEDURE — 80053 COMPREHEN METABOLIC PANEL: CPT

## 2022-10-20 PROCEDURE — 2500000003 HC RX 250 WO HCPCS: Performed by: SURGERY

## 2022-10-20 PROCEDURE — 6360000002 HC RX W HCPCS

## 2022-10-20 PROCEDURE — 3600000002 HC SURGERY LEVEL 2 BASE: Performed by: SURGERY

## 2022-10-20 PROCEDURE — 0Y6P0Z0 DETACHMENT AT RIGHT 1ST TOE, COMPLETE, OPEN APPROACH: ICD-10-PCS | Performed by: SURGERY

## 2022-10-20 PROCEDURE — 85027 COMPLETE CBC AUTOMATED: CPT

## 2022-10-20 PROCEDURE — 6370000000 HC RX 637 (ALT 250 FOR IP): Performed by: INTERNAL MEDICINE

## 2022-10-20 PROCEDURE — 88305 TISSUE EXAM BY PATHOLOGIST: CPT

## 2022-10-20 PROCEDURE — 6360000002 HC RX W HCPCS: Performed by: INTERNAL MEDICINE

## 2022-10-20 PROCEDURE — 7100000000 HC PACU RECOVERY - FIRST 15 MIN: Performed by: SURGERY

## 2022-10-20 PROCEDURE — 7100000001 HC PACU RECOVERY - ADDTL 15 MIN: Performed by: SURGERY

## 2022-10-20 PROCEDURE — 2580000003 HC RX 258: Performed by: SURGERY

## 2022-10-20 PROCEDURE — 82962 GLUCOSE BLOOD TEST: CPT

## 2022-10-20 PROCEDURE — 36415 COLL VENOUS BLD VENIPUNCTURE: CPT

## 2022-10-20 PROCEDURE — 2500000003 HC RX 250 WO HCPCS

## 2022-10-20 PROCEDURE — 2700000000 HC OXYGEN THERAPY PER DAY

## 2022-10-20 PROCEDURE — 2580000003 HC RX 258: Performed by: INTERNAL MEDICINE

## 2022-10-20 PROCEDURE — 2140000000 HC CCU INTERMEDIATE R&B

## 2022-10-20 PROCEDURE — 3700000001 HC ADD 15 MINUTES (ANESTHESIA): Performed by: SURGERY

## 2022-10-20 PROCEDURE — 88311 DECALCIFY TISSUE: CPT

## 2022-10-20 PROCEDURE — 2709999900 HC NON-CHARGEABLE SUPPLY: Performed by: SURGERY

## 2022-10-20 RX ORDER — PHENYLEPHRINE HCL IN 0.9% NACL 1 MG/10 ML
SYRINGE (ML) INTRAVENOUS PRN
Status: DISCONTINUED | OUTPATIENT
Start: 2022-10-20 | End: 2022-10-20 | Stop reason: SDUPTHER

## 2022-10-20 RX ORDER — LIDOCAINE HYDROCHLORIDE 10 MG/ML
INJECTION, SOLUTION EPIDURAL; INFILTRATION; INTRACAUDAL; PERINEURAL
Status: COMPLETED | OUTPATIENT
Start: 2022-10-20 | End: 2022-10-20

## 2022-10-20 RX ORDER — DEXAMETHASONE SODIUM PHOSPHATE 4 MG/ML
INJECTION, SOLUTION INTRA-ARTICULAR; INTRALESIONAL; INTRAMUSCULAR; INTRAVENOUS; SOFT TISSUE PRN
Status: DISCONTINUED | OUTPATIENT
Start: 2022-10-20 | End: 2022-10-20 | Stop reason: SDUPTHER

## 2022-10-20 RX ORDER — FENTANYL CITRATE 0.05 MG/ML
INJECTION, SOLUTION INTRAMUSCULAR; INTRAVENOUS PRN
Status: DISCONTINUED | OUTPATIENT
Start: 2022-10-20 | End: 2022-10-20 | Stop reason: SDUPTHER

## 2022-10-20 RX ORDER — LIDOCAINE HYDROCHLORIDE 20 MG/ML
INJECTION, SOLUTION EPIDURAL; INFILTRATION; INTRACAUDAL; PERINEURAL PRN
Status: DISCONTINUED | OUTPATIENT
Start: 2022-10-20 | End: 2022-10-20 | Stop reason: SDUPTHER

## 2022-10-20 RX ORDER — PROPOFOL 10 MG/ML
INJECTION, EMULSION INTRAVENOUS PRN
Status: DISCONTINUED | OUTPATIENT
Start: 2022-10-20 | End: 2022-10-20 | Stop reason: SDUPTHER

## 2022-10-20 RX ORDER — ONDANSETRON 2 MG/ML
INJECTION INTRAMUSCULAR; INTRAVENOUS PRN
Status: DISCONTINUED | OUTPATIENT
Start: 2022-10-20 | End: 2022-10-20 | Stop reason: SDUPTHER

## 2022-10-20 RX ADMIN — ENOXAPARIN SODIUM 30 MG: 100 INJECTION SUBCUTANEOUS at 11:43

## 2022-10-20 RX ADMIN — HYDRALAZINE HYDROCHLORIDE 25 MG: 25 TABLET ORAL at 06:01

## 2022-10-20 RX ADMIN — LIDOCAINE HYDROCHLORIDE 100 MG: 20 INJECTION, SOLUTION EPIDURAL; INFILTRATION; INTRACAUDAL; PERINEURAL at 07:59

## 2022-10-20 RX ADMIN — DEXAMETHASONE SODIUM PHOSPHATE 4 MG: 4 INJECTION, SOLUTION INTRAMUSCULAR; INTRAVENOUS at 08:21

## 2022-10-20 RX ADMIN — PROPOFOL 120 MG: 10 INJECTION, EMULSION INTRAVENOUS at 07:59

## 2022-10-20 RX ADMIN — OXYCODONE AND ACETAMINOPHEN 1 TABLET: 5; 325 TABLET ORAL at 11:43

## 2022-10-20 RX ADMIN — ONDANSETRON 4 MG: 2 INJECTION INTRAMUSCULAR; INTRAVENOUS at 21:19

## 2022-10-20 RX ADMIN — SODIUM CHLORIDE: 9 INJECTION, SOLUTION INTRAVENOUS at 07:02

## 2022-10-20 RX ADMIN — ONDANSETRON 4 MG: 2 INJECTION INTRAMUSCULAR; INTRAVENOUS at 08:21

## 2022-10-20 RX ADMIN — Medication 400 MG: at 11:49

## 2022-10-20 RX ADMIN — VANCOMYCIN HYDROCHLORIDE 1000 MG: 1 INJECTION, POWDER, LYOPHILIZED, FOR SOLUTION INTRAVENOUS at 07:48

## 2022-10-20 RX ADMIN — FUROSEMIDE 80 MG: 10 INJECTION, SOLUTION INTRAMUSCULAR; INTRAVENOUS at 21:19

## 2022-10-20 RX ADMIN — FENTANYL CITRATE 50 MCG: 50 INJECTION INTRAMUSCULAR; INTRAVENOUS at 08:27

## 2022-10-20 RX ADMIN — ONDANSETRON 4 MG: 2 INJECTION INTRAMUSCULAR; INTRAVENOUS at 15:02

## 2022-10-20 RX ADMIN — METOLAZONE 5 MG: 5 TABLET ORAL at 11:42

## 2022-10-20 RX ADMIN — ENOXAPARIN SODIUM 30 MG: 100 INJECTION SUBCUTANEOUS at 21:20

## 2022-10-20 RX ADMIN — ASPIRIN 81 MG CHEWABLE TABLET 81 MG: 81 TABLET CHEWABLE at 11:42

## 2022-10-20 RX ADMIN — ATORVASTATIN CALCIUM 40 MG: 40 TABLET, FILM COATED ORAL at 21:19

## 2022-10-20 RX ADMIN — SODIUM CHLORIDE, PRESERVATIVE FREE 10 ML: 5 INJECTION INTRAVENOUS at 21:28

## 2022-10-20 RX ADMIN — ISOSORBIDE MONONITRATE 30 MG: 30 TABLET, EXTENDED RELEASE ORAL at 11:42

## 2022-10-20 RX ADMIN — Medication 0.3 MG: at 08:09

## 2022-10-20 RX ADMIN — Medication 0.1 MG: at 08:06

## 2022-10-20 RX ADMIN — CARVEDILOL 6.25 MG: 6.25 TABLET, FILM COATED ORAL at 11:43

## 2022-10-20 RX ADMIN — OXYCODONE AND ACETAMINOPHEN 1 TABLET: 5; 325 TABLET ORAL at 01:15

## 2022-10-20 RX ADMIN — Medication 400 MG: at 21:20

## 2022-10-20 RX ADMIN — CARVEDILOL 6.25 MG: 6.25 TABLET, FILM COATED ORAL at 21:19

## 2022-10-20 RX ADMIN — CLOPIDOGREL BISULFATE 75 MG: 75 TABLET ORAL at 11:42

## 2022-10-20 ASSESSMENT — PAIN - FUNCTIONAL ASSESSMENT
PAIN_FUNCTIONAL_ASSESSMENT: PREVENTS OR INTERFERES SOME ACTIVE ACTIVITIES AND ADLS
PAIN_FUNCTIONAL_ASSESSMENT: NONE - DENIES PAIN

## 2022-10-20 ASSESSMENT — PAIN SCALES - GENERAL
PAINLEVEL_OUTOF10: 0
PAINLEVEL_OUTOF10: 2
PAINLEVEL_OUTOF10: 5
PAINLEVEL_OUTOF10: 8

## 2022-10-20 ASSESSMENT — PAIN DESCRIPTION - DIRECTION
RADIATING_TOWARDS: RIGHT
RADIATING_TOWARDS: RIGHT

## 2022-10-20 ASSESSMENT — ENCOUNTER SYMPTOMS
APNEA: 0
RECTAL PAIN: 0
COLOR CHANGE: 0
BACK PAIN: 0
CONSTIPATION: 0
SORE THROAT: 0
PHOTOPHOBIA: 0
STRIDOR: 0
EYE ITCHING: 0
CHOKING: 0
EYE REDNESS: 0
ANAL BLEEDING: 0

## 2022-10-20 ASSESSMENT — PAIN DESCRIPTION - PAIN TYPE
TYPE: SURGICAL PAIN
TYPE: SURGICAL PAIN

## 2022-10-20 ASSESSMENT — PAIN DESCRIPTION - ORIENTATION
ORIENTATION: RIGHT

## 2022-10-20 ASSESSMENT — PAIN DESCRIPTION - ONSET
ONSET: GRADUAL
ONSET: ON-GOING
ONSET: ON-GOING

## 2022-10-20 ASSESSMENT — PAIN DESCRIPTION - LOCATION
LOCATION: FOOT

## 2022-10-20 ASSESSMENT — PAIN DESCRIPTION - FREQUENCY
FREQUENCY: CONTINUOUS
FREQUENCY: INTERMITTENT
FREQUENCY: CONTINUOUS

## 2022-10-20 ASSESSMENT — PAIN DESCRIPTION - DESCRIPTORS
DESCRIPTORS: BURNING;DISCOMFORT
DESCRIPTORS: BURNING

## 2022-10-20 NOTE — PROGRESS NOTES
7033- Patient arrived in PACU from OR, PACU monitors applied and alarms set. Bedside report from [facility], [address] and Gricelda Harris CRNA. Patinet arouses to name call, denies pain, respirations equal and unlabored. 4538- Patient continues to deny pain or nausea  0912- Patient repositioned in bed  0920- PACU care complete, report called to IFEOMA Sebastian on 3N.  4740- Patient taken to 3N by this RN. Bedside handoff to [employer], RN. Patient appears in no acute distress, A+Ox4, respirations equal and unlabored on 3 LPM NC, which is patient baseline per reports, denies pain or nausea.

## 2022-10-20 NOTE — PROGRESS NOTES
Hospitalist Progress Note      Name:  Magalys Alicia /Age/Sex: 1950  (67 y.o. male)   MRN & CSN:  [id number] & [id number] Admission Date/Time: 10/11/2022 10:47 PM   Location:  OR/NONE PCP: Ofe Arriola Day: 10                                               Attending Physician Dr Skylar Pedersen and Plan:   Magalys Alicia is a 67 y.o.  male  who presents with Heart failure exacerbated by sotalol [facility])    Present on Admission:   Heart failure exacerbated by sotalol ([facility] [address]   CHF (congestive heart failure), NYHA class I, acute on chronic, combined ([facility] [address] [facility]   Precordial pain   Diabetic ulcer of right foot due to type 2 diabetes mellitus ([facility] [address]   PAD (peripheral artery disease) ([facility] [address] [facility]   Biventricular ICD (implantable cardioverter-defibrillator) in place   Acute on chronic diastolic CHF (congestive heart failure) ([facility] [address] [facility]   Leg swelling     Severe PAD  CAD   S/p angiogram and PCI to left SFA (10/18/2022)   Pending right SFA intervention today but taken for R great toe amp   DAPT/Imdur   Long term AC-Xarelto     Acute decompensated HFrEF-biventricular failure s/p AICD-pending interrogation per cards    Cardiology following  Strict I&O's - output yesterday. Appears to be down 14 pounds since admission  Continue Lasix 80 mg 3 times daily/Zaroxolyn  PT/OT recommending SNF but patient refusing    CKD stage 4 baseline (1.7-2.6)  sCr 2.9   Nephrology following     Bilateral LE Wounds. POA   General surgery following with Right great toe amputation POD#0   Wound team following    Chronic respiratory failure with hypoxia 2/2 COPD  no exacerbation  3 L NC home oxygen, baseline   Continue bronchodilators/pulmonary hygiene    DMII with chronic complications and with long term use of insulin.  Last A1C 7.1 (10/12/22)   Monitor FSBS and cover with medium dose SSI   Hold PO medications while inpatient   Continue gabapentin    Diet Diet NPO Exceptions are: Sips of Water with Meds   DVT Prophylaxis [x] Lovenox, []  Heparin, [] SCDs, [] Ambulation   GI Prophylaxis [x] PPI,  [] H2 Blocker,  [] Carafate,  [] Diet/Tube Feeds   Code Status Full Code     -Patient assessment and plan discussed with supervising physician-  Subjective 10/20/2022     Jp Rosen is a 67 y.o.  male, who presented with  Leg Swelling (B/L)    Evaluation postsurgery. Patient is somnolent but awakens to voice. Ten point ROS reviewed negative, unless as noted above    Objective: Intake/Output Summary (Last 24 hours) at 10/20/2022 0937  Last data filed at 10/20/2022 0854  Gross per 24 hour   Intake 200 ml   Output 2352 ml   Net -2152 ml        Vitals:   Vitals:    10/20/22 0854 10/20/22 0856 10/20/22 0902 10/20/22 0916   BP: (!) 126/59 129/60 130/69 120/60   Pulse: 60 60 60 60   Resp: 14 12 (!) 9 11   Temp: 97.2 °F (36.2 °C)      TempSrc:       SpO2: 99% 93% 96% 96%   Weight:       Height:         Physical Exam: 10/20/22     GEN -asleep nontoxic but chronically ill appearing male, sitting upright in bed , NAD. Chronically ill body habitus. Appears given age. EYES -Pupils are equally round. No scleral erythema, discharge, or conjunctivitis. HENT -MM are moist. Oral pharynx without exudates, no evidence of thrush. NECK -Supple, no apparent thyromegaly or masses. RESP -CTA, no wheezes, rales or rhonchi. Symmetric chest movement while on supplemental oxygen. C/V -S1/S2 auscultated. RRR +murmur. No JVD or carotid bruits. Peripheral pulses equal bilaterally and palpable. + peripheral edema. GI -Abdomen is soft non distended and without significant tenderness. No masses or guarding. + BS Rectal exam deferred.  -No CVA tenderness. Fuller catheter is not present. LYMPH-No palpable cervical lymphadenopathy and no hepatosplenomegaly. No petechiae or ecchymoses. MS -No gross joint deformities. SKIN -Normal coloration, warm, dry. Bilateral lower extremity foot wounds dressings CDI.   Chronic skin changes consistent with venous stasis dermatitis. Postoperative dressing in place  NEURO-Cranial nerves appear grossly intact, normal speech, flight of ideas/tangential communication. no lateralizing weakness. PSYC-asleep but awakens to voice, alert, oriented x 2. Appropriate affect.     Medications:   Medications:    sodium chloride flush  5-40 mL IntraVENous 2 times per day    aspirin  81 mg Oral Daily    clopidogrel  75 mg Oral Daily    furosemide  80 mg IntraVENous TID    metOLazone  5 mg Oral Daily    magnesium oxide  400 mg Oral BID    carvedilol  6.25 mg Oral BID    hydrALAZINE  25 mg Oral 3 times per day    isosorbide mononitrate  30 mg Oral Daily    lidocaine PF  5 mL IntraDERmal Once    sodium chloride flush  5-40 mL IntraVENous 2 times per day    atorvastatin  40 mg Oral Nightly    [Held by provider] metaxalone  800 mg Oral TID    tiotropium  2 puff Inhalation Daily    sodium chloride flush  5-40 mL IntraVENous 2 times per day    enoxaparin  30 mg SubCUTAneous BID    insulin lispro  0-4 Units SubCUTAneous TID WC    insulin lispro  0-4 Units SubCUTAneous Nightly    collagenase   Topical Daily      Infusions:    sodium chloride 75 mL/hr at 10/20/22 0752    sodium chloride      sodium chloride      sodium chloride      dextrose       PRN Meds: sodium chloride flush, 5-40 mL, PRN  sodium chloride, , PRN  acetaminophen, 650 mg, Q4H PRN  sodium chloride flush, 5-40 mL, PRN  sodium chloride, , PRN  albuterol sulfate HFA, 2 puff, Q4H PRN  oxyCODONE-acetaminophen, 1 tablet, BID PRN  sodium chloride flush, 5-40 mL, PRN  sodium chloride, , PRN  ondansetron, 4 mg, Q8H PRN   Or  ondansetron, 4 mg, Q6H PRN  glucose, 4 tablet, PRN  dextrose bolus, 125 mL, PRN   Or  dextrose bolus, 250 mL, PRN  glucagon (rDNA), 1 mg, PRN  dextrose, , Continuous PRN      LABS  CBC   Recent Labs     10/18/22  0419 10/19/22  0649   WBC 6.3 5.7   HGB 8.2* 8.7*   HCT 26.8* 28.7*    230        RENAL  Recent Labs     10/18/22  0419 10/19/22  0649    137   K 4.6 4.8   CL 99 101   CO2 28 31   BUN 47* 53*   CREATININE 2.8* 2.6*       LFT'S  Recent Labs     10/18/22  0419 10/19/22  0649   AST 9* 9*   ALT <5* <5*   BILITOT 0.8 0.7   ALKPHOS 69 78       Radiology this visit:  Reviewed. XR CHEST PORTABLE    Result Date: 10/16/2022  EXAMINATION: ONE XRAY VIEW OF THE CHEST 10/16/2022 11:06 am COMPARISON: 10/11/2022 HISTORY: ORDERING SYSTEM PROVIDED HISTORY: Follow-up on pulmonary edema TECHNOLOGIST PROVIDED HISTORY: Reason for exam:->Follow-up on pulmonary edema Reason for Exam: Follow-up on pulmonary edema FINDINGS: Transvenous pacer remains in place. Cardiomegaly. Pulmonary vascular congestion, pulmonary edema, right pleural effusion. Findings suggest congestive heart failure     XR CHEST PORTABLE    Result Date: 10/11/2022  EXAMINATION: ONE XRAY VIEW OF THE CHEST 10/11/2022 11:22 pm COMPARISON: June 22, 2022 HISTORY: ORDERING SYSTEM PROVIDED HISTORY: chest pain TECHNOLOGIST PROVIDED HISTORY: Reason for exam:->chest pain Reason for Exam: chest pain FINDINGS: There is mild pulmonary vascular congestion. No effusion is identified. The heart size is normal.  A pulse generator is present over the left anterior chest wall with 2 leads projecting over the heart. Mild pulmonary vascular congestion. VL DUP LOWER EXTREMITY ARTERIES BILATERAL    Result Date: 10/14/2022  EXAMINATION: ARTERIAL DUPLEX ULTRASOUND OF THE BILATERAL LOWER EXTREMITIES  10/13/2022 8:49 pm TECHNIQUE: Duplex ultrasound using B-mode/gray scaled imaging, Doppler spectral analysis and color flow Doppler was obtained of the bilateral lower extremities. COMPARISON: None. HISTORY: ORDERING SYSTEM PROVIDED HISTORY: PVD/ non healing ulcers TECHNOLOGIST PROVIDED HISTORY: Reason for exam:->PVD/ non healing ulcers FINDINGS: Exam was extremely difficult due to lack of cooperation with the patient and extreme skin thickness and hardening in the bilateral calves.   There is edema in the bilateral lower extremities. There is atherosclerosis in the visualized arteries and there is an enlarged left groin lymph node measuring 1.4 x 2.4 x 2.7 cm in size. These are not significantly changed when compared to prior CT abdomen pelvis from 01/24/2022. There are grossly triphasic waveforms in the right leg from the common femoral artery to the popliteal artery and there are monophasic waveforms below the knee in the anterior tibial and posterior tibial arteries. There are grossly triphasic waveforms in the left common femoral and superficial femoral arteries. There are grossly monophasic waveforms in the popliteal artery and proximal anterior tibial and peroneal arteries which are not visualized distally. Flow velocities were measured as follows: Com. Fem. 175 cm/sec Prof.           143 cm/sec SFA Prox. 179 cm/sec SFA Mid.     105 cm/sec SFA Dist.     73 cm/sec Pop.           72 cm/sec PTA           68 cm/sec Peron. 25 cm/sec TE           not visualized cm/sec Left: Flow velocities were measured as follows: Com. Fem. 61 cm/sec Prof.           118 cm/sec SFA Prox. 70 cm/sec SFA Mid.     49 cm/sec SFA Dist.     75 cm/sec Pop.           33 cm/sec PTA           41 proximally cm/sec Peron. 41 proximally cm/sec TE           not visualized cm/sec     Abnormal waveforms in the left leg as described without visualization of the peroneal artery or distal anterior posterior tibial arteries suspicious for severe peripheral vascular disease. Conventional arteriography is recommended for further evaluation. Tardus parvus waveforms in the right leg below the knee in the anterior and posterior tibial arteries with nonvisualization of the peroneal artery.              Electronically signed by MICKY Riddle CNP on 10/20/2022 at 9:37 AM

## 2022-10-20 NOTE — OP NOTE
Procedure Note:    Patient ID:  Ruben Domingo  [id number]  67 y.o.  1950    Indications: Ischemic Right first toe     Pre-operative Diagnosis: Ischemic right first toe    Post-operative Diagnosis: same    Procedure:  Amputation of right first toe    Surgeon: Darell Hogue MD , MD    Findings:  same    Estimated Blood Loss:  Minimal           Total IV Fluids: 500 ml            Complications:  None; patient tolerated the procedure well. Disposition: PACU - hemodynamically stable. Condition: stable    Procedure Details: The patient was seen again in the Holding Room. The risks, benefits, complications, treatment options, and expected outcomes were discussed with the patient. The possibilities of reaction to medication, pulmonary aspiration, bleeding, recurrent infection, the need for additional procedures, and development of a complication requiring transfusion or further operation were discussed with the patient and/or family. There was concurrence with the proposed plan, and informed consent was obtained. The site of surgery was properly noted/marked. The patient was taken to the Operating Room, identified as Ruben Domingo, and the procedure verified. A Time Out was held and the above information confirmed. The patient was brought to the operating room and placed supine. After the induction of anesthesia, the right foot was prepped and draped in the usual sterile fashion. Then, 0.25% Marcaine was infiltrated around the base of the first toe and a fish-mouth type of incision was made at the base of the toe circumferentially, and the incision was deepened through subcutaneous tissue using Bovie electrocautery with good hemostasis. The PIP was isolated and circumferentially dissected using the periosteal elevator. The joint was cut at the tarsometatarsal joint and the wound was hemostatic using Bovie electrocautery. The specimen was sent to Pathology.  Using 3-0 Vicryl, the fascial plane was closed covering the bony stump and 3-0 nylon was used to approximate the skin in interrupted fashion. Fluffs and Kerlix   dressings were applied. The patient was subsequently transferred to the recovery room in stable condition. Instrument and lap counts were correct at the end of the case.        Sam Dalal MD

## 2022-10-20 NOTE — PROGRESS NOTES
Nephrology Progress Note  10/20/2022 8:33 AM        Subjective:   Admit Date: 10/11/2022  PCP: Chad Joshi    Interval History: Patient seen early morning in the same-day surgery area  Going for to amputation    Diet: N.p.o. for surgery    ROS: He is completely alert awake and oriented this morning, no shortness of breath or confusion  Urine output recorded 2.35 L for the last 24 hours  No fever    Data:     Current meds:    vancomycin  1,000 mg IntraVENous Once    sodium chloride flush  5-40 mL IntraVENous 2 times per day    aspirin  81 mg Oral Daily    clopidogrel  75 mg Oral Daily    furosemide  80 mg IntraVENous TID    metOLazone  5 mg Oral Daily    magnesium oxide  400 mg Oral BID    carvedilol  6.25 mg Oral BID    hydrALAZINE  25 mg Oral 3 times per day    isosorbide mononitrate  30 mg Oral Daily    lidocaine PF  5 mL IntraDERmal Once    sodium chloride flush  5-40 mL IntraVENous 2 times per day    atorvastatin  40 mg Oral Nightly    [Held by provider] metaxalone  800 mg Oral TID    tiotropium  2 puff Inhalation Daily    sodium chloride flush  5-40 mL IntraVENous 2 times per day    enoxaparin  30 mg SubCUTAneous BID    insulin lispro  0-4 Units SubCUTAneous TID WC    insulin lispro  0-4 Units SubCUTAneous Nightly    collagenase   Topical Daily      sodium chloride 75 mL/hr at 10/20/22 0752    sodium chloride      sodium chloride      sodium chloride      dextrose           I/O last 3 completed shifts:   In: 480 [P.O.:480]  Out: 2350 [Urine:2350]    CBC:   Recent Labs     10/18/22  0419 10/19/22  0649   WBC 6.3 5.7   HGB 8.2* 8.7*    230          Recent Labs     10/18/22  0419 10/19/22  0649    137   K 4.6 4.8   CL 99 101   CO2 28 31   BUN 47* 53*   CREATININE 2.8* 2.6*   GLUCOSE 186* 134*       Lab Results   Component Value Date    CALCIUM 8.2 (L) 10/19/2022    PHOS 3.5 10/14/2022       Objective:     Vitals: BP (!) 106/53   Pulse 64   Temp 97.9 °F (36.6 °C)   Resp 18   Ht 6' 1\" (1.854 m)   Wt 260 lb (117.9 kg)   SpO2 98%   BMI 34.30 kg/m² ,    General appearance: Alert, awake and oriented, he was in good spirit  HEENT: At least 2+ conjunctival pallor  Neck: Seems supple  Lungs: No gross crackles on auscultation  Heart: Regular rate and rhythm, chest wall implanted cardiac device  Abdomen: Soft, nontender  Extremities: Leg edema better and chronic wound      Problem List :         Impression :     Acute kidney injury with underlying CKD stage G4 A1-good urine output with diuretics-unfortunately general surgery and anesthesia-we will have inherent risk for kidney injury-  Acute decompensated heart failure-diuresing well with diuretics  Underlying severe atherosclerotic cardiovascular disease, status post left superficial femoral artery angioplasty  Transient confusion has resolved-his gabapentin and Skelaxin is on hold    Recommendation/Plan  :     I would avoid hypotensive episode during surgery  Ideally systolic blood pressure should be 120 or more  Of course the inflammatory process from surgery is inevitable-we will see how he does  Lab tomorrow morning  Follow clinically and biochemically and watch fluid status closely      Kriss Salgado MD MD

## 2022-10-20 NOTE — PROGRESS NOTES
Physical Therapy  Pt went for an amputation of the R first toe, hold today and re-assess by PT tomorrow. Sachin Morelos PTA

## 2022-10-20 NOTE — CARE COORDINATION
CM met with pt for a f/u visit to encourage SNF d/t PT/OT rec's. CM Karen Ramsay had met with pt to discuss SNF on 10/14 and pt adamantly refused. Pt became angry when this CM started talking about the need for him to go to a SNF. He states \"I have been to one before and I don't want to go again, it is just about money. \"  CM explained the need for therapy in a SNF an that we are concerned for his safety if he goes home d/t he is very weak. Pt states that he did not walk prior to admission. He states that he uses an electric w/c. He informed CM that his family helps him as needed. CM asked pt to please just think about it. After [facility] encouragement pt states that he will think about it and will talk to his son and daughter tonight. Informed pt that this CM will return in the am for his decision.   TE

## 2022-10-20 NOTE — OP NOTE
Procedure Note:    Patient ID:  Ioana Kline  [id number]  67 y.o.  1950    Indications: Ischemic right great toe    Pre-operative Diagnosis: Ischemic right first toe    Post-operative Diagnosis: same    Procedure:  Amputation of right first toe    Surgeon: Ridge Adams MD    Resident Assisting: Zenia Zaman MD    Findings:  same    Estimated Blood Loss:  Minimal           Total IV Fluids: 500 ml            Complications:  None; patient tolerated the procedure well. Disposition: PACU - hemodynamically stable. Condition: stable    Procedure Details: The patient was seen again in the Holding Room. The risks, benefits, complications, treatment options, and expected outcomes were discussed with the patient. The possibilities of reaction to medication, pulmonary aspiration, bleeding, recurrent infection, the need for additional procedures, and development of a complication requiring transfusion or further operation were discussed with the patient and/or family. There was concurrence with the proposed plan, and informed consent was obtained. The site of surgery was properly noted/marked. The patient was taken to the Operating Room, identified as Ioana Kline, and the procedure verified. A Time Out was held and the above information confirmed. The patient was brought to the operating room and   placed supine. After the induction of anesthesia, the right foot was prepped   and draped in the usual sterile fashion. Then, 0.25% Marcaine was   infiltrated around the base of the right first toe and a fish-mouth type of   incision was made at the base of the toe circumferentially, and the incision   was deepened through subcutaneous tissue using Bovie electrocautery with good   hemostasis. The bone was isolated and circumferentially dissected using the   periosteal elevator. The bone was cut at the tarsometatarsal joint and the   wound was hemostatic using Bovie electrocautery.  The specimen was sent to   Pathology. Using 3-0 Vicryl, the fascial plane was closed covering the bony stump and 4-0 nylon was used to approximate the skin in interrupted fashion. Fluffs and Kerlix   dressings were applied. The patient was subsequently transferred to the   recovery room in stable condition. Instrument and lap counts were correct at the end of the case. Dr. Cholo Talamantes was present and participated in all aspects of the case. Electronically signed:    Jennifer Weaver MD  General Surgery, R4

## 2022-10-20 NOTE — ANESTHESIA PRE PROCEDURE
Department of Anesthesiology  Preprocedure Note       Name:  Krysta Galaviz   Age:  67 y.o.  :  1950                                          MRN:  [id number]         Date:  10/20/2022      Surgeon: Jared Sam):  Adrián Louise MD    Procedure: Procedure(s):  Right First TOE AMPUTATION    Medications prior to admission:   Prior to Admission medications    Medication Sig Start Date End Date Taking? Authorizing Provider   oxyCODONE-acetaminophen (PERCOCET) 5-325 MG per tablet Take 1 tablet by mouth 2 times daily as needed for Pain. Historical Provider, MD   diclofenac sodium (VOLTAREN) 1 % GEL APPLY 4 G TOPICALLY 2 TIMES DAILY 22   Peyton Cuenca MD   amLODIPine (NORVASC) 10 MG tablet TAKE 1 TABLET BY MOUTH EVERY DAY 22   Peyton Cuenca MD   lisinopril (PRINIVIL;ZESTRIL) 5 MG tablet Take 1 tablet by mouth daily 2/3/22 7/21/22  Cinthia Joiner MD   metOLazone (ZAROXOLYN) 2.5 MG tablet Take 1 tablet by mouth daily 2/3/22 7/21/22  Cinthia Joiner MD   torsemide (DEMADEX) 20 MG tablet Take 2 tablets by mouth daily 2/3/22 7/21/22  Cinthia Joiner MD   gabapentin (NEURONTIN) 300 MG capsule TAKE 1 CAPSULE BY MOUTH 3 TIMES DAILY FOR 90 DAYS.  11/15/21 7/21/22  Bridgette Molina PA-C   metaxalone (SKELAXIN) 800 MG tablet Take 800 mg by mouth 3 times daily    Historical Provider, MD   Trinity Nones 18 MCG inhalation capsule  21   Historical Provider, MD   insulin lispro, 1 Unit Dial, 100 UNIT/ML SOPN Inject into the skin 2 times daily Sliding scale dose    Historical Provider, MD   atorvastatin (LIPITOR) 40 MG tablet Take 1 tablet by mouth nightly 20   Linda Rubio MD   carvedilol (COREG) 3.125 MG tablet Take 1 tablet by mouth 2 times daily 20   Linda Rubio MD   albuterol sulfate HFA (VENTOLIN HFA) 108 (90 Base) MCG/ACT inhaler Inhale 2 puffs into the lungs every 4 hours as needed for Wheezing 20   Jeff Curry MD   Calcium Alginate (ALGICELL CALCIUM DRESSING 4\"X8) MISC Apply 1 Device topically daily 3/20/20   Amaris Salgado MD   [facility] COREY WOUND DRESSING MATRIX Apply topically Apply to left daily and cover with gauze 3/19/20   Amaris Salgado MD   clopidogrel (PLAVIX) 75 MG tablet Take 1 tablet by mouth daily 7/18/17   Loli Greenwood MD       Current medications:    Current Facility-Administered Medications   Medication Dose Route Frequency Provider Last Rate Last Admin    0.9 % sodium chloride infusion   IntraVENous Continuous Alexa Lindsey MD 75 mL/hr at 10/18/22 1508 New Bag at 10/18/22 1508    sodium chloride flush 0.9 % injection 5-40 mL  5-40 mL IntraVENous 2 times per day Alexa Lindsey MD   10 mL at 10/19/22 2147    sodium chloride flush 0.9 % injection 5-40 mL  5-40 mL IntraVENous PRN Alexa Lindsey MD        0.9 % sodium chloride infusion   IntraVENous PRN Alexa Lindsey MD        acetaminophen (TYLENOL) tablet 650 mg  650 mg Oral Q4H PRN Alexa Lindsey MD        aspirin chewable tablet 81 mg  81 mg Oral Daily Alexa Lindsey MD   81 mg at 10/19/22 1044    clopidogrel (PLAVIX) tablet 75 mg  75 mg Oral Daily Alexa Lindsey MD   75 mg at 10/19/22 1031    furosemide (LASIX) injection 80 mg  80 mg IntraVENous TID Alexa Lindsey MD   80 mg at 10/19/22 2150    metOLazone (ZAROXOLYN) tablet 5 mg  5 mg Oral Daily Alexa Lindsey MD   5 mg at 10/19/22 1044    magnesium oxide (MAG-OX) tablet 400 mg  400 mg Oral BID Alexa Lindsey MD   400 mg at 10/19/22 2131    carvedilol (COREG) tablet 6.25 mg  6.25 mg Oral BID Alexa Lindsey MD   6.25 mg at 10/19/22 2131    hydrALAZINE (APRESOLINE) tablet 25 mg  25 mg Oral 3 times per day Oneil Nguyen MD   25 mg at 10/19/22 2131    isosorbide mononitrate (IMDUR) extended release tablet 30 mg  30 mg Oral Daily Alexa Lindsey MD   30 mg at 10/19/22 1043    lidocaine PF 1 % injection 5 mL  5 mL IntraDERmal Once Alexa Lindsey MD        sodium chloride flush 0.9 % injection 5-40 mL  5-40 mL IntraVENous 2 times per day Faiq MD Camilo   10 mL at 10/19/22 2148    sodium chloride flush 0.9 % injection 5-40 mL  5-40 mL IntraVENous PRN Nely Pierson MD        0.9 % sodium chloride infusion   IntraVENous PRN Nely Pierson MD        albuterol sulfate HFA (PROVENTIL;VENTOLIN;PROAIR) 108 (90 Base) MCG/ACT inhaler 2 puff  2 puff Inhalation Q4H PRN Nely Pierson MD        atorvastatin (LIPITOR) tablet 40 mg  40 mg Oral Nightly Nely Pierson MD   40 mg at 10/19/22 2131    [Held by provider] metaxalone (SKELAXIN) tablet 800 mg  ++NON FORMULARY++  (Patient Supplied)  800 mg Oral TID Nely Pierson MD        oxyCODONE-acetaminophen (PERCOCET) 5-325 MG per tablet 1 tablet  1 tablet Oral BID PRN Nely Pierson MD   1 tablet at 10/19/22 2139    tiotropium (SPIRIVA RESPIMAT) 2.5 MCG/ACT inhaler 2 puff  2 puff Inhalation Daily Nely Pierson MD   2 puff at 10/19/22 0742    sodium chloride flush 0.9 % injection 5-40 mL  5-40 mL IntraVENous 2 times per day Nely Pierson MD   10 mL at 10/19/22 2148    sodium chloride flush 0.9 % injection 5-40 mL  5-40 mL IntraVENous PRN Nely Pierson MD        0.9 % sodium chloride infusion   IntraVENous PRN Nely Pierson MD        enoxaparin Sodium (LOVENOX) injection 30 mg  30 mg SubCUTAneous BID Nely Pierson MD   30 mg at 10/19/22 2131    ondansetron (ZOFRAN-ODT) disintegrating tablet 4 mg  4 mg Oral Q8H PRN Nely Pierson MD   4 mg at 10/19/22 2140    Or    ondansetron (ZOFRAN) injection 4 mg  4 mg IntraVENous Q6H PRN Nely Pierson MD        glucose chewable tablet 16 g  4 tablet Oral PRN Nely Pierson MD        dextrose bolus 10% 125 mL  125 mL IntraVENous PRN Nely Pierson MD        Or    dextrose bolus 10% 250 mL  250 mL IntraVENous PRN Nely Pierson MD        glucagon (rDNA) injection 1 mg  1 mg SubCUTAneous PRN Nely Pierson MD        dextrose 10 % infusion   IntraVENous Continuous PRN Nely Pierson MD        insulin lispro (HUMALOG) injection vial 0-4 Units  0-4 Units SubCUTAneous TID  Nely Pierson MD   1 Units at 10/19/22 1648    insulin lispro (HUMALOG) injection vial 0-4 Units  0-4 Units SubCUTAneous Nightly Marko Navarrete MD        collagenase ointment   Topical Daily Marko Navarrete MD   Given at 10/19/22 1003       Allergies:     Allergies   Allergen Reactions    Pcn [Penicillins] Hives    Fentanyl Itching       Problem List:    Patient Active Problem List   Diagnosis Code    PAD (peripheral artery disease) ([facility]) I73.9    Chronic coronary artery disease I25.10    Biventricular ICD (implantable cardioverter-defibrillator) in place Z95.810    Chronic combined systolic and diastolic heart failure ([facility]) I50.42    Chronic kidney disease, stage III (moderate) ([facility]) N18.30    Mixed hyperlipidemia E78.2    Sick sinus syndrome ([facility] I49.5    Type 2 diabetes mellitus with diabetic polyneuropathy ([facility] E11.42    Spinal stenosis of lumbar region M48.061    Obesity, Class I, BMI 30-34.9 E66.9    S/P partial colectomy Z90.49    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    Microalbuminuria R80.9    WD-PVD (peripheral vascular disease) ([facility]) I73.9    Limb ischemia I99.8    Necrotic toes ([facility]) I96    Toe gangrene ([facility] I96    Diabetic foot infection ([facility]) E11.628, L08.9    Chronic kidney disease (CKD) stage G3a/A2, moderately decreased glomerular filtration rate (GFR) between 45-59 mL/min/1.73 square meter and albuminuria creatinine ratio between  mg/g ([facility]) N18.31    Edema R60.9    ICD (implantable cardioverter-defibrillator) battery depletion Z45.02    Hyperkalemia E87.5    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[facility]) I96    Ischemia of toe I99.8    Acute kidney injury ([facility]) N17.9    Fluid overload E87.70    DM (diabetes mellitus) ([facility]) E11.9    Precordial pain R07.2    Acute chest pain R07.9    Unstable angina ([facility]) I20.0    Chronic kidney disease (CKD) stage G3a/A3, moderately decreased glomerular filtration rate (GFR) between 45-59 mL/min/1.73 square meter and albuminuria creatinine ratio greater than 300 mg/g ([facility]) N18.31  Cardiomyopathy ([facility] I42.9    Diabetic neuropathy ([facility]) E11.40    HTN (hypertension) I10    Epigastric pain R10.13    Acute on chronic congestive heart failure ([facility]) I50.9    Leg edema R60.0    Acute on chronic systolic CHF (congestive heart failure) ([facility]) I50.23    Diabetic foot ulcer with osteomyelitis ([facility]) E11.621, E11.69, L97.509, M86.9    Visit for wound check Z51.89    Moderate malnutrition ([facility]) E44.0    Long term (current) use of antibiotics Z79.2    WD-Diabetic ulcer of toe of right foot associated with type 2 diabetes mellitus, with fat layer exposed ([facility]) E11.621, L97.512    Diabetic ulcer of toe associated with type 2 diabetes mellitus, with bone involvement without evidence of necrosis ([facility]) E11.621, L97.506    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 B87.9    Persistent wound pain R52    Receiving intravenous antibiotic treatment as outpatient Z79.2    Acute on chronic respiratory failure with hypoxemia ([facility]) J96.21    WD-Chronic foot ulcer, left, with necrosis of bone ([facility]) L97.524    Acute on chronic HFrEF (heart failure with reduced ejection fraction) ([facility]) I50.23    Scrotal edema N50.89    Hypertensive urgency I16.0    Diabetic ulcer of right foot due to type 2 diabetes mellitus ([facility]) E11.621, L97.519    Cellulitis of foot L03.119    Toe osteomyelitis ([facility]) M86.9    Heart failure exacerbated by sotalol ([facility]) I50.9    CHF (congestive heart failure), NYHA class I, acute on chronic, combined ([facility]) I50.43    Acute on chronic diastolic CHF (congestive heart failure) ([facility]) I50.33    Leg swelling M79.89       Past Medical History:        Diagnosis Date    Acid reflux     Acute MI ([facility] 2004, 2008    Arthritis     Back    Broken teeth     Upper Front    CAD (coronary artery disease)     Sees Dr. [facility])     per old chart    CHF (congestive heart failure) ([facility])     Chronic back pain     Chronic kidney disease     STAGE 3 KIDNEY FAILURE- \"from my diabetes- do not follow with any one- have seen Dr Vivien Khoury in the past\"    Diabetes mellitus [facility]) Dx 1965    per old chart pt has been diabetic since age 13    Diabetic neuropathy ([address]     \"in my feet\"    H/O cardiovascular stress test 08/25/2016    H/O Doppler ultrasound 09/27/2018    Moderate disease of the right lower extremity with an JALEN of 0.72.   Moderate to severe disease of the left lower extremity with an JALEN of 0.55.   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    Neuropathy     both feet    neuropathy     PAD (peripheral artery disease) ([address] 09/27/2018    PVD (peripheral vascular disease) ([address]     Sick sinus syndrome ([address]     Sleep apnea     \"sleep study 3 yrs ago- could not tolerate the cpap made me too dry\"    Spinal stenosis     Teeth missing     Upper And Lower    Type 2 diabetes mellitus without complication ([address]     WD-Chronic foot ulcer, left, with necrosis of bone ([address] 11/12/2021       Past Surgical History:        Procedure Laterality Date    CARDIAC CATHETERIZATION      per old chart done 10/2014    CARDIAC CATHETERIZATION  07/14/2017    with angiography of leg    CARDIAC CATHETERIZATION  11/20/2018    PATENT STENTS OF ALL THREE MAJOR VESSELS    CARDIAC DEFIBRILLATOR PLACEMENT  06/04/2010    [employer] [address] Defibrillator Implanted    COLECTOMY Right 08/26/2016    laparascopic; robotic assisted    COLONOSCOPY  08/04/2016    CORONARY ANGIOPLASTY      \"15 Heart Stents\"    CORONARY ANGIOPLASTY WITH STENT PLACEMENT      per old chart had angio with stent to circumflex and obtuse marginal artery at [facility] 2010( old chart also gives hx of stent placement done , and )    DENTAL SURGERY      Teeth Extracted In Past    IR TUNNELED CATHETER PLACEMENT GREATER THAN 5 YEARS  2021    IR TUNNELED CATHETER PLACEMENT GREATER THAN 5 YEARS 2021 [facility] SPECIAL PROCEDURES    PACEMAKER PLACEMENT  2010    [employer] [address] Defibrillator Implanted    TOE AMPUTATION Right 2017    Rt 3rd toe    TOE AMPUTATION Right 2018     Right 5th toe amputation and Toenails trimmed left 2,3,4 and 5th toes    TOE AMPUTATION Left 2020    LEFT GREAT TOE AMPUTATION performed by Kris Anna MD at [address] [facility] [place] Left 2022    LEFT SECOND TOE AMPUTATION performed by Yi Park MD at [facility]      per old chart had balloon angioplasty right superfical femoral artery,right popliteal artery,,right ant.tibial artery, right tibioperoneal trunk, and right post.tibial artery wna stent placement right popliteal artery and superfical femoral artery 2012       Social History:    Social History     Tobacco Use    Smoking status: Former     Packs/day: 0.25     Years: 36.00     Pack years: 9.00     Types: Cigars, Cigarettes     Start date: 1980     Quit date: 10/22/2021     Years since quittin.9    Smokeless tobacco: Never    Tobacco comments:     Client states he has stopped smoking   Substance Use Topics    Alcohol use:  No                                Counseling given: Not Answered  Tobacco comments: Client states he has stopped smoking      Vital Signs (Current):   Vitals:    10/19/22 2032 10/19/22 2103 10/19/22 2134 10/19/22 2209   BP: (!) 163/78 (!) 162/77 121/69    Pulse: 65 68 67    Resp: 13 13 14 14   Temp:       TempSrc:       SpO2:   97%    Weight:       Height:                                                  BP Readings from Last 3 Encounters:   10/19/22 121/69   22 136/80   22 (!) 195/79       NPO Status: BMI:   Wt Readings from Last 3 Encounters:   10/19/22 266 lb 0.5 oz (120.7 kg)   08/22/22 246 lb (111.6 kg)   08/04/22 246 lb (111.6 kg)     Body mass index is 35.1 kg/m².     CBC:   Lab Results   Component Value Date/Time    WBC 5.7 10/19/2022 06:49 AM    RBC 3.21 10/19/2022 06:49 AM    HGB 8.7 10/19/2022 06:49 AM    HCT 28.7 10/19/2022 06:49 AM    MCV 89.4 10/19/2022 06:49 AM    RDW 18.0 10/19/2022 06:49 AM     10/19/2022 06:49 AM       CMP:   Lab Results   Component Value Date/Time     10/19/2022 06:49 AM    K 4.8 10/19/2022 06:49 AM    K 5.1 01/10/2018 04:32 AM     10/19/2022 06:49 AM    CO2 31 10/19/2022 06:49 AM    BUN 53 10/19/2022 06:49 AM    CREATININE 2.6 10/19/2022 06:49 AM    GFRAA 25 10/17/2022 06:50 AM    LABGLOM 25 10/19/2022 06:49 AM    GLUCOSE 134 10/19/2022 06:49 AM    PROT 6.0 10/19/2022 06:49 AM    PROT 6.3 01/21/2013 12:10 PM    CALCIUM 8.2 10/19/2022 06:49 AM    BILITOT 0.7 10/19/2022 06:49 AM    ALKPHOS 78 10/19/2022 06:49 AM    AST 9 10/19/2022 06:49 AM    ALT <5 10/19/2022 06:49 AM       POC Tests:   Recent Labs     10/19/22  2145   POCGLU 147*       Coags:   Lab Results   Component Value Date/Time    PROTIME 13.3 08/30/2021 08:40 AM    PROTIME 11.2 09/08/2011 06:02 PM    INR 1.03 08/30/2021 08:40 AM    APTT 41.7 08/30/2021 08:40 AM       HCG (If Applicable): No results found for: PREGTESTUR, PREGSERUM, HCG, HCGQUANT     ABGs: No results found for: PHART, PO2ART, JAJ7HKK, UMK0BEH, BEART, G0NQPSYT     Type & Screen (If Applicable):  No results found for: LABABO, LABRH    Drug/Infectious Status (If Applicable):  No results found for: HIV, HEPCAB    COVID-19 Screening (If Applicable):   Lab Results   Component Value Date/Time    COVID19 NOT DETECTED 01/25/2022 04:00 PM    COVID19 NOT DETECTED 10/29/2021 04:09 AM           Anesthesia Evaluation  Patient summary reviewed and Nursing notes reviewed no history of anesthetic complications:   Airway: Mallampati: I  TM distance: <3 FB   Neck ROM: full  Mouth opening: > = 3 FB   Dental:    (+) poor dentition      Pulmonary:   (+) sleep apnea:  decreased breath sounds: bilateral                            Cardiovascular:    (+) hypertension:, angina:, pacemaker:, past MI:, CAD:, CHF:,                ROS comment:  Conclusions      Summary   Left ventricular systolic function is normal.   Ejection fraction is visually estimated at 50-55%. Mild left ventricular hypertrophy. Grade II diastolic dysfunction. PPM wiring visualized in right heart. Mildly dilated right atrium. Severely dilated right ventricle. Severe tricuspid regurgitation; RVSP: 80 mmHg. No evidence of any pericardial effusion. Right pleural effusion. Inferior vena cava is dilated, measuring at 2.6 cm, and does not collapse   with respiration. Dilated aortic root (4.4 cm). Signature      ------------------------------------------------------------------   Electronically signed by Linda Anderson MD (Interpreting   physician) on 10/12/2022 at 03:38 PM   ------------------------------------------------------------------     Neuro/Psych:   (+) neuromuscular disease:,             GI/Hepatic/Renal:   (+) renal disease:,           Endo/Other:    (+) Diabetes, blood dyscrasia: anemia:., .                 Abdominal:             Vascular:   + PVD, aortic or cerebral, . Other Findings:           Anesthesia Plan      MAC     ASA 4       Induction: intravenous. MIPS: Postoperative opioids intended and Prophylactic antiemetics administered. Anesthetic plan and risks discussed with patient.       Plan discussed with attending and surgical team.                    Kristin Sauceda MD   10/20/2022

## 2022-10-20 NOTE — PROGRESS NOTES
General Surgery-[facility] Day: 10    ChiefComplaint on Admission: right first toe osteo      Subjective:     Ioana Kline is a 67 y.o. male with right first toe osteo. Patient has right foot pain. Tolerating Diet NPO Exceptions are: Sips of Water with Meds. + BM.     ROS:  Review of Systems   Constitutional:  Negative for chills and fever. HENT:  Negative for ear pain, mouth sores, sore throat and tinnitus. Eyes:  Negative for photophobia, redness and itching. Respiratory:  Negative for apnea, choking and stridor. Cardiovascular:  Negative for chest pain and palpitations. Gastrointestinal:  Negative for anal bleeding, constipation and rectal pain. Endocrine: Negative for polydipsia. Genitourinary:  Negative for enuresis, flank pain and hematuria. Musculoskeletal:  Positive for gait problem. Negative for back pain and myalgias. Skin:  Positive for wound. Negative for color change and pallor. Allergic/Immunologic: Negative for environmental allergies. Neurological:  Negative for syncope and speech difficulty. Psychiatric/Behavioral:  Negative for confusion and hallucinations. Allergies  Pcn [penicillins] and Fentanyl          Diagnosis Date    Acid reflux     Acute MI ([address] 2004, 2008    Arthritis     Back    Broken teeth     Upper Front    CAD (coronary artery disease)     [facility])     per old chart    CHF (congestive heart failure) ([facility]     Chronic back pain     Chronic kidney disease     STAGE 3 KIDNEY FAILURE- \"from my diabetes- do not follow with any one- have seen Dr Aren Diggs in the past\"    Diabetes mellitus ([facility] Dx 1965    per old chart pt has been diabetic since age 13    Diabetic neuropathy ([address]     \"in my feet\"    H/O cardiovascular stress test 08/25/2016    H/O Doppler ultrasound 09/27/2018    Moderate disease of the right lower extremity with an JALEN of 0.72.   Moderate to severe disease of the left lower extremity with an JALEN of 0.55. 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    Neuropathy     both feet    neuropathy     PAD (peripheral artery disease) ([facility] [facility] 2018    PVD (peripheral vascular disease) ([facility] [address]     Sick sinus syndrome (HCC)     Sleep apnea     \"sleep study 3 yrs ago- could not tolerate the cpap made me too dry\"    Spinal stenosis     Teeth missing     Upper And Lower    Type 2 diabetes mellitus without complication ([facility] [address]     WD-Chronic foot ulcer, left, with necrosis of bone ([facility] [address] [facility] 2021       Objective:     Vitals:    10/20/22 0933   BP: (!) 145/70   Pulse: 60   Resp: 12   Temp: (!) 96.3 °F (35.7 °C)   SpO2:        TEMPERATURE:  Current - Temp: (!) 96.3 °F (35.7 °C); Max - Temp  Av.5 °F (36.4 °C)  Min: 96.3 °F (35.7 °C)  Max: 98 °F (36.7 °C)    I/O this shift:  In: 200 [I.V.:200]  Out: 2 [Blood:2]I/O last 3 completed shifts: In: 480 [P.O.:480]  Out: 2350 [Urine:2350]      Physical Exam:    Physical Exam  Constitutional:       Appearance: He is well-developed. HENT:      Head: Normocephalic. Eyes:      Pupils: Pupils are equal, round, and reactive to light. Cardiovascular:      Rate and Rhythm: Normal rate. Pulmonary:      Effort: Pulmonary effort is normal.   Abdominal:      General: There is no distension. Palpations: Abdomen is soft. There is no mass. Tenderness: There is no abdominal tenderness. There is no guarding or rebound. Musculoskeletal:         General: Normal range of motion. Cervical back: Normal range of motion and neck supple. Feet:    Skin:     General: Skin is warm.    Neurological:      Mental Status: He is alert and oriented to person, place, and time. Motor: Weakness present.       Gait: Gait abnormal.           Scheduled Meds:   sodium chloride flush  5-40 mL IntraVENous 2 times per day    aspirin  81 mg Oral Daily    clopidogrel  75 mg Oral Daily    furosemide  80 mg IntraVENous TID    metOLazone  5 mg Oral Daily    magnesium oxide  400 mg Oral BID    carvedilol  6.25 mg Oral BID    hydrALAZINE  25 mg Oral 3 times per day    isosorbide mononitrate  30 mg Oral Daily    lidocaine PF  5 mL IntraDERmal Once    sodium chloride flush  5-40 mL IntraVENous 2 times per day    atorvastatin  40 mg Oral Nightly    [Held by provider] metaxalone  800 mg Oral TID    tiotropium  2 puff Inhalation Daily    sodium chloride flush  5-40 mL IntraVENous 2 times per day    enoxaparin  30 mg SubCUTAneous BID    insulin lispro  0-4 Units SubCUTAneous TID WC    insulin lispro  0-4 Units SubCUTAneous Nightly    collagenase   Topical Daily     Continuous Infusions:   sodium chloride 75 mL/hr at 10/20/22 0752    sodium chloride      sodium chloride      sodium chloride      dextrose       PRN Meds:sodium chloride flush, sodium chloride, acetaminophen, sodium chloride flush, sodium chloride, albuterol sulfate HFA, oxyCODONE-acetaminophen, sodium chloride flush, sodium chloride, ondansetron **OR** ondansetron, glucose, dextrose bolus **OR** dextrose bolus, glucagon (rDNA), dextrose      Labs/Imaging Results:   Lab Results   Component Value Date    WBC 5.7 10/19/2022    HGB 8.7 (L) 10/19/2022    HCT 28.7 (L) 10/19/2022    MCV 89.4 10/19/2022     10/19/2022     Lab Results   Component Value Date     10/19/2022    K 4.8 10/19/2022     10/19/2022    CO2 31 10/19/2022    BUN 53 (H) 10/19/2022    CREATININE 2.6 (H) 10/19/2022    GLUCOSE 134 (H) 10/19/2022    CALCIUM 8.2 (L) 10/19/2022    PROT 6.0 (L) 10/19/2022    LABALBU 2.8 (L) 10/19/2022    BILITOT 0.7 10/19/2022    ALKPHOS 78 10/19/2022    AST 9 (L) 10/19/2022 ALT <5 (L) 10/19/2022    LABGLOM 25 (L) 10/19/2022    GFRAA 25 (L) 10/17/2022       Assessment:     Patient Active Problem List:     PAD (peripheral artery disease) ([facility] [address]     Chronic coronary artery disease     Biventricular ICD (implantable cardioverter-defibrillator) in place     Chronic combined systolic and diastolic heart failure (HCC)     Chronic kidney disease, stage III (moderate) ([facility])     Mixed hyperlipidemia     Sick sinus syndrome ([facility])     Type 2 diabetes mellitus with diabetic polyneuropathy ([facility] [address]     Spinal stenosis of lumbar region     Obesity, Class I, BMI 30-34.9     S/P partial colectomy     Tubulovillous adenoma of colon     Microalbuminuria     WD-PVD (peripheral vascular disease) ([facility])     Limb ischemia     Necrotic toes ([facility])     Toe gangrene ([facility])     Diabetic foot infection ([facility] [address]     Chronic kidney disease (CKD) stage G3a/A2, moderately decreased glomerular filtration rate (GFR) between 45-59 mL/min/1.73 square meter and albuminuria creatinine ratio between  mg/g ([facility])     Edema     ICD (implantable cardioverter-defibrillator) battery depletion     Hyperkalemia     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[facility])     Ischemia of toe     Acute kidney injury ([facility] [address]     Fluid overload     DM (diabetes mellitus) ([facility])     Precordial pain     Acute chest pain     Unstable angina ([facility])     Chronic kidney disease (CKD) stage G3a/A3, moderately decreased glomerular filtration rate (GFR) between 45-59 mL/min/1.73 square meter and albuminuria creatinine ratio greater than 300 mg/g (HCC)     Cardiomyopathy (HCC)     Diabetic neuropathy ([facility])     HTN (hypertension)     Epigastric pain     Acute on chronic congestive heart failure (HCC)     Leg edema     Acute on chronic systolic CHF (congestive heart failure) ([facility])     Diabetic foot ulcer with osteomyelitis ([facility] [address]     Visit for wound check     Moderate malnutrition ([address]     Long term (current) use of antibiotics     WD-Diabetic ulcer of toe of right foot associated with type 2 diabetes mellitus, with fat layer exposed ([address]     Diabetic ulcer of toe associated with type 2 diabetes mellitus, with bone involvement without evidence of necrosis ([address]     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     Persistent wound pain     Receiving intravenous antibiotic treatment as outpatient     Acute on chronic respiratory failure with hypoxemia ([address]     WD-Chronic foot ulcer, left, with necrosis of bone (HCC)     Acute on chronic HFrEF (heart failure with reduced ejection fraction) (HCC)     Scrotal edema     Hypertensive urgency     Diabetic ulcer of right foot due to type 2 diabetes mellitus ([address]     Cellulitis of foot     Toe osteomyelitis (HCC)     Heart failure exacerbated by sotalol (HCC)     CHF (congestive heart failure), NYHA class I, acute on chronic, combined (HCC)     Acute on chronic diastolic CHF (congestive heart failure) (HCC)     Leg swelling      Plan:        Will proceed with right first toe amputation this am.      Sharri Galaviz MD

## 2022-10-20 NOTE — PROGRESS NOTES
80 Dr Funmi Hilton notified that there was bleeding from Right great toe surgical site. Haley Mares NP notified of surgical site bleeding , blood is fresh and soaked through dressing, but area is not heavily saturated. Pt also has low BP and diaphoretic. New orders received to hold transfer to med surg for now, hold lasix and BP meds this shift. Pt's right leg was elevated. Pt is still receiving antiplatet/anticoagulants for recent SFA ballooning, provider wishes to continue those at this time. Pt moves a lot in the bed and had been thrashing about. Pt instructed to try to stay more still with right leg.

## 2022-10-21 LAB
ALBUMIN SERPL-MCNC: 2.9 GM/DL (ref 3.4–5)
ALP BLD-CCNC: 76 IU/L (ref 40–128)
ALT SERPL-CCNC: 11 U/L (ref 10–40)
ANION GAP SERPL CALCULATED.3IONS-SCNC: 9 MMOL/L (ref 4–16)
AST SERPL-CCNC: 22 IU/L (ref 15–37)
BILIRUB SERPL-MCNC: 0.9 MG/DL (ref 0–1)
BUN BLDV-MCNC: 66 MG/DL (ref 6–23)
CALCIUM SERPL-MCNC: 8.1 MG/DL (ref 8.3–10.6)
CHLORIDE BLD-SCNC: 98 MMOL/L (ref 99–110)
CO2: 32 MMOL/L (ref 21–32)
CREAT SERPL-MCNC: 3.1 MG/DL (ref 0.9–1.3)
GFR SERPL CREATININE-BSD FRML MDRD: 21 ML/MIN/1.73M2
GLUCOSE BLD-MCNC: 146 MG/DL (ref 70–99)
GLUCOSE BLD-MCNC: 176 MG/DL (ref 70–99)
GLUCOSE BLD-MCNC: 200 MG/DL (ref 70–99)
GLUCOSE BLD-MCNC: 205 MG/DL (ref 70–99)
GLUCOSE BLD-MCNC: 230 MG/DL (ref 70–99)
HCT VFR BLD CALC: 33.9 % (ref 42–52)
HEMOGLOBIN: 10 GM/DL (ref 13.5–18)
MCH RBC QN AUTO: 27.2 PG (ref 27–31)
MCHC RBC AUTO-ENTMCNC: 29.5 % (ref 32–36)
MCV RBC AUTO: 92.4 FL (ref 78–100)
PDW BLD-RTO: 18.3 % (ref 11.7–14.9)
PLATELET # BLD: 275 K/CU MM (ref 140–440)
PMV BLD AUTO: 9.6 FL (ref 7.5–11.1)
POTASSIUM SERPL-SCNC: 5 MMOL/L (ref 3.5–5.1)
RBC # BLD: 3.67 M/CU MM (ref 4.6–6.2)
SODIUM BLD-SCNC: 139 MMOL/L (ref 135–145)
TOTAL PROTEIN: 6.4 GM/DL (ref 6.4–8.2)
WBC # BLD: 7.9 K/CU MM (ref 4–10.5)

## 2022-10-21 PROCEDURE — 6360000002 HC RX W HCPCS: Performed by: SURGERY

## 2022-10-21 PROCEDURE — 94640 AIRWAY INHALATION TREATMENT: CPT

## 2022-10-21 PROCEDURE — 6370000000 HC RX 637 (ALT 250 FOR IP): Performed by: SURGERY

## 2022-10-21 PROCEDURE — 99233 SBSQ HOSP IP/OBS HIGH 50: CPT | Performed by: INTERNAL MEDICINE

## 2022-10-21 PROCEDURE — 51798 US URINE CAPACITY MEASURE: CPT

## 2022-10-21 PROCEDURE — 97530 THERAPEUTIC ACTIVITIES: CPT

## 2022-10-21 PROCEDURE — 94761 N-INVAS EAR/PLS OXIMETRY MLT: CPT

## 2022-10-21 PROCEDURE — 2700000000 HC OXYGEN THERAPY PER DAY

## 2022-10-21 PROCEDURE — 80053 COMPREHEN METABOLIC PANEL: CPT

## 2022-10-21 PROCEDURE — 82962 GLUCOSE BLOOD TEST: CPT

## 2022-10-21 PROCEDURE — 85027 COMPLETE CBC AUTOMATED: CPT

## 2022-10-21 PROCEDURE — 2580000003 HC RX 258: Performed by: SURGERY

## 2022-10-21 PROCEDURE — 2140000000 HC CCU INTERMEDIATE R&B

## 2022-10-21 PROCEDURE — 36415 COLL VENOUS BLD VENIPUNCTURE: CPT

## 2022-10-21 RX ADMIN — ATORVASTATIN CALCIUM 40 MG: 40 TABLET, FILM COATED ORAL at 20:19

## 2022-10-21 RX ADMIN — CARVEDILOL 6.25 MG: 6.25 TABLET, FILM COATED ORAL at 11:05

## 2022-10-21 RX ADMIN — OXYCODONE AND ACETAMINOPHEN 1 TABLET: 5; 325 TABLET ORAL at 22:05

## 2022-10-21 RX ADMIN — CARVEDILOL 6.25 MG: 6.25 TABLET, FILM COATED ORAL at 20:19

## 2022-10-21 RX ADMIN — HYDRALAZINE HYDROCHLORIDE 25 MG: 25 TABLET ORAL at 14:44

## 2022-10-21 RX ADMIN — CLOPIDOGREL BISULFATE 75 MG: 75 TABLET ORAL at 11:05

## 2022-10-21 RX ADMIN — SODIUM CHLORIDE, PRESERVATIVE FREE 10 ML: 5 INJECTION INTRAVENOUS at 11:06

## 2022-10-21 RX ADMIN — INSULIN LISPRO 1 UNITS: 100 INJECTION, SOLUTION INTRAVENOUS; SUBCUTANEOUS at 16:25

## 2022-10-21 RX ADMIN — ISOSORBIDE MONONITRATE 30 MG: 30 TABLET, EXTENDED RELEASE ORAL at 11:06

## 2022-10-21 RX ADMIN — Medication 400 MG: at 11:06

## 2022-10-21 RX ADMIN — HYDRALAZINE HYDROCHLORIDE 25 MG: 25 TABLET ORAL at 07:51

## 2022-10-21 RX ADMIN — ENOXAPARIN SODIUM 30 MG: 100 INJECTION SUBCUTANEOUS at 11:06

## 2022-10-21 RX ADMIN — ENOXAPARIN SODIUM 30 MG: 100 INJECTION SUBCUTANEOUS at 20:19

## 2022-10-21 RX ADMIN — ALBUTEROL SULFATE 2 PUFF: 90 AEROSOL, METERED RESPIRATORY (INHALATION) at 08:09

## 2022-10-21 RX ADMIN — Medication 400 MG: at 20:19

## 2022-10-21 RX ADMIN — ASPIRIN 81 MG CHEWABLE TABLET 81 MG: 81 TABLET CHEWABLE at 11:05

## 2022-10-21 RX ADMIN — OXYCODONE AND ACETAMINOPHEN 1 TABLET: 5; 325 TABLET ORAL at 16:30

## 2022-10-21 RX ADMIN — TIOTROPIUM BROMIDE INHALATION SPRAY 2 PUFF: 3.12 SPRAY, METERED RESPIRATORY (INHALATION) at 08:11

## 2022-10-21 ASSESSMENT — PAIN DESCRIPTION - DESCRIPTORS
DESCRIPTORS: CRUSHING
DESCRIPTORS: BURNING;STABBING

## 2022-10-21 ASSESSMENT — PAIN - FUNCTIONAL ASSESSMENT: PAIN_FUNCTIONAL_ASSESSMENT: ACTIVITIES ARE NOT PREVENTED

## 2022-10-21 ASSESSMENT — PAIN SCALES - GENERAL
PAINLEVEL_OUTOF10: 9
PAINLEVEL_OUTOF10: 0
PAINLEVEL_OUTOF10: 9

## 2022-10-21 ASSESSMENT — PAIN DESCRIPTION - ORIENTATION
ORIENTATION: RIGHT
ORIENTATION: RIGHT

## 2022-10-21 ASSESSMENT — PAIN DESCRIPTION - LOCATION: LOCATION: FOOT

## 2022-10-21 NOTE — PROGRESS NOTES
Today's plan: Patient had left SFA intervention yesterday, will proceed with right SFA intervention patient has a stent in-stent restenosis once ok from surgical bleeding risk stand point of recent rt foot toe amputations      Admit Date:  10/11/2022    Subjective:ok      Chief complaints on admission  Chief Complaint   Patient presents with    Leg Swelling     B/L         History of present illness:Anmol is a 67 y. o.year old who  presents with had concerns including Leg Swelling (B/L). Past medical history:    has a past medical history of Acid reflux, Acute MI ([address] Arthritis, Broken teeth, CAD (coronary artery disease), Cardiomyopathy ([address] CHF (congestive heart failure) ([address] Chronic back pain, Chronic kidney disease, Diabetes mellitus ([address] Diabetic neuropathy ([address] H/O cardiovascular stress test, H/O Doppler ultrasound, H/O percutaneous left heart catheterization, History of irregular heartbeat, History of syncope, Hyperlipidemia, Hypertension, Leg swelling, Necrotic toes (HCC), Neuropathy, neuropathy, PAD (peripheral artery disease) ([address] PVD (peripheral vascular disease) ([address] Sick sinus syndrome ([address] Sleep apnea, Spinal stenosis, Teeth missing, Type 2 diabetes mellitus without complication ([address] and WD-Chronic foot ulcer, left, with necrosis of bone ([address] Past surgical history:   has a past surgical history that includes Coronary angioplasty with stent; Dental surgery; Colonoscopy (08/04/2016); pacemaker placement (06/04/2010); vascular surgery; colectomy (Right, 08/26/2016); Toe amputation (Right, 09/12/2017); Toe amputation (Right, 01/09/2018); Cardiac catheterization; Cardiac defibrillator placement (06/04/2010); Coronary angioplasty; Cardiac catheterization (07/14/2017); Cardiac catheterization (11/20/2018); Toe amputation (Left, 12/26/2020); IR TUNNELED CVC PLACE WO SQ PORT/PUMP > 5 YEARS (6/14/2021); Toe amputation (Left, 7/26/2022); and Toe amputation (Right, 10/20/2022).   Social History:   reports that he quit smoking about a year ago. His smoking use included cigars and cigarettes. He started smoking about 42 years ago. He has a 9.00 pack-year smoking history. He has never used smokeless tobacco. He reports current drug use. Drug: Marijuana Kristen Cotton). He reports that he does not drink alcohol. Family history:  family history includes Cancer in his brother, father, and son; Diabetes in his brother, mother, and sister; Early Death (age of onset: 62) in his sister; Heart Disease in his brother and sister; High Blood Pressure in his brother, brother, and mother; Neuropathy in his sister; Other in his sister; Stroke in his mother; Vision Loss in his mother. Allergies   Allergen Reactions    Pcn [Penicillins] Hives    Fentanyl Itching         Objective:   /68   Pulse 60   Temp 98.2 °F (36.8 °C) (Oral)   Resp 19   Ht 6' 1\" (1.854 m)   Wt 261 lb 11.2 oz (118.7 kg)   SpO2 100%   BMI 34.53 kg/m²     Intake/Output Summary (Last 24 hours) at 10/21/2022 1719  Last data filed at 10/21/2022 1239  Gross per 24 hour   Intake --   Output 2000 ml   Net -2000 ml         TELEMETRY:      Physical Exam:  Constitutional:  Well developed, Well nourished, No acute distress, Non-toxic appearance. HENT:  Normocephalic, Atraumatic, Bilateral external ears normal, Oropharynx moist, No oral exudates, Nose normal. Neck- Normal range of motion, No tenderness, Supple, No stridor. Eyes:  PERRL, EOMI, Conjunctiva normal, No discharge. Respiratory:  Normal breath sounds, No respiratory distress, No wheezing, No chest tenderness. ,no use of accessory muscles, diaphragm movement is normal  Cardiovascular: (PMI) apex non displaced,no lifts no thrills, no s3,no s4, Normal heart rate, Normal rhythm, No murmurs, No rubs, No gallops.  Carotid arteries pulse and amplitude are normal no bruit, no abdominal bruit noted ( normal abdominal aorta ausculation), femoral arteries pulse and amplitude are normal no bruit, pedal pulses are normal  GI:  Bowel sounds normal, Soft, No tenderness, No masses, No pulsatile masses. : External genitalia appear normal, No masses or lesions. No discharge. No CVA tenderness. Musculoskeletal:  foot ulcer, dressed , + edema, No tenderness, No cyanosis, No clubbing. Good range of motion in all major joints. No tenderness to palpation or major deformities noted. Back- No tenderness. Integument:  Warm, Dry, No erythema, No rash. Lymphatic:  No lymphadenopathy noted. Neurologic:  Alert & oriented x 3, Normal motor function, Normal sensory function, No focal deficits noted.    Psychiatric:  Affect normal, Judgment normal, Mood normal.     Medications:    sodium chloride flush  5-40 mL IntraVENous 2 times per day    aspirin  81 mg Oral Daily    clopidogrel  75 mg Oral Daily    [Held by provider] furosemide  80 mg IntraVENous TID    [Held by provider] metOLazone  5 mg Oral Daily    magnesium oxide  400 mg Oral BID    carvedilol  6.25 mg Oral BID    hydrALAZINE  25 mg Oral 3 times per day    isosorbide mononitrate  30 mg Oral Daily    lidocaine PF  5 mL IntraDERmal Once    sodium chloride flush  5-40 mL IntraVENous 2 times per day    atorvastatin  40 mg Oral Nightly    [Held by provider] metaxalone  800 mg Oral TID    tiotropium  2 puff Inhalation Daily    sodium chloride flush  5-40 mL IntraVENous 2 times per day    enoxaparin  30 mg SubCUTAneous BID    insulin lispro  0-4 Units SubCUTAneous TID WC    insulin lispro  0-4 Units SubCUTAneous Nightly    collagenase   Topical Daily      sodium chloride      sodium chloride      sodium chloride      dextrose       sodium chloride flush, sodium chloride, acetaminophen, sodium chloride flush, sodium chloride, albuterol sulfate HFA, oxyCODONE-acetaminophen, sodium chloride flush, sodium chloride, ondansetron **OR** ondansetron, glucose, dextrose bolus **OR** dextrose bolus, glucagon (rDNA), dextrose    Lab Data:  CBC:   Recent Labs     10/19/22  0649 10/20/22  1020 10/21/22  0742   WBC 5.7 7.0 7.9   HGB 8.7* 8.7* 10.0*   HCT 28.7* 29.2* 33.9*   MCV 89.4 91.5 92.4    229 275       BMP:   Recent Labs     10/19/22  0649 10/20/22  1020 10/21/22  0742    139 139   K 4.8 5.0 5.0    100 98*   CO2 31 33* 32   BUN 53* 58* 66*   CREATININE 2.6* 2.9* 3.1*       LIVER PROFILE:   Recent Labs     10/19/22  0649 10/20/22  1020 10/21/22  0742   AST 9* 11* 22   ALT <5* <5* 11   BILITOT 0.7 0.8 0.9   ALKPHOS 78 70 76       PT/INR: No results for input(s): PROTIME, INR in the last 72 hours. APTT: No results for input(s): APTT in the last 72 hours. BNP:  No results for input(s): BNP in the last 72 hours. TROPONIN: @TROPONINI:3@      Assessment:  67 y. o.year old who is admitted for          Plan:  Biventericular failure: on IV milrinone drip and lasix IV, case discussed with nepthrology today,   Severe PAD and foot ulcer, arterial doppler suggested diminished blood flow to the feet, will proceed with lower extermtiy angiogram, case discussed with  nephrology  CAD AND  cardiomyopathy with CKD: [facility] performed 2018 ,patient stent, will continue medical treatment, has ICD  ICD: will interrogate optivol  Health maintenance: exerise and diet  All labs, medications and tests reviewed, continue all other medications of all above medical condition listed as is.       Krishna Connell MD, MD 10/21/2022 5:19 PM

## 2022-10-21 NOTE — PROGRESS NOTES
Occupational Therapy Treatment Note  Name: Evan Zhang MRN: [id number] :   1950   Date:  10/21/2022   Admission Date: 10/11/2022 Room:  [address] [place] [address]-A     Primary Problem:  The primary encounter diagnosis was Leg swelling. Diagnoses of Acute on chronic heart failure, unspecified heart failure type ([address] and Diabetic foot ulcer with osteomyelitis ([facility] were also pertinent to this visit. Pt  has a past medical history of Acid reflux, Acute MI ([address] Arthritis, Broken teeth, CAD (coronary artery disease), Cardiomyopathy ([address] CHF (congestive heart failure) ([address] Chronic back pain, Chronic kidney disease, Diabetes mellitus ([facility] [address] Diabetic neuropathy ([facility] [address] H/O cardiovascular stress test, H/O Doppler ultrasound, H/O percutaneous left heart catheterization, History of irregular heartbeat, History of syncope, Hyperlipidemia, Hypertension, Leg swelling, Necrotic toes (HCC), Neuropathy, neuropathy, PAD (peripheral artery disease) ([address] PVD (peripheral vascular disease) ([facility] [address] Sick sinus syndrome ([facility] [address] Sleep apnea, Spinal stenosis, Teeth missing, Type 2 diabetes mellitus without complication ([facility] [address] and WD-Chronic foot ulcer, left, with necrosis of bone ([facility] [address] Communication with other providers:  RN, RN handoff     Subjective:  Patient states:  \"Well you guys keep trying to get me to that chair, so I guess I will. \"  Pain: c/o discomfort but reported pain to be managed   Restrictions: general, fall, tele, R toe amputation, O2 needs  RNx2 at bedside    Objective:    Observation:  pt was in bed upon arrival, agreeable to session  Objective Measures:  100% SpO2  Cardiopulmonary: 3L O2     Treatment, including education:    Therapeutic Activity Training:   Therapeutic activity training was instructed today. Cues were given for safety, sequence, UE/LE placement, visual cues, and balance. Activities performed today included:    Bed mobility:   Pt completed sup to sit with SBA and inc time to complete.  Pt provided cues throughout. Pt completed scooting to EOB SBA. Pt tolerated EOB well without evidence of lean SBA. Pt demo'd restlessness at EOB and did not elaborate on discomfort when asked. Pt tolerated EOB for prolonged time during room setup. Transfers:  Pt completed from EOB Velia without AD. Pt transitioned into SPT from EOB to recliner Velia for steadying and pt utilized therapist shoulders for support to pivot. Pt demo good eccentric control to sit in chair with good hand placement. Pt left in recliner with pillows for comfort and BLE elevated for edema control.        Education: Role of OT, OT POC, safety, benefits of EOB/OOB activity, rationale for treatment, importance of movement     Safety Measures: Gait belt used for safety of pt and therapist, Left in recliner, Alarm in place, call light and phone within reach, lines managed, extension placed on O2, external cath in place, O2 supp in nose and read 100% SpO2, needs met     Assessment / Impression:      Patient's tolerance of treatment: Good with encouragement   Adverse Reaction: none   Significant change in status and impact:  none  Barriers to improvement: strength, endurance, skin integrity, edema     Plan for Next Session:    Continue OT POC    Time in:  1444  Time out:  1503  Timed treatment minutes:  19  Total treatment time:  19    Electronically signed by:    ANI Mckeon/LAURA  License: [id number]  42/03/9674, 3:45 PM

## 2022-10-21 NOTE — PROGRESS NOTES
This nurse emptied 800ml from purwick canister @ 1200 and replaced canister. Pt bladder scanned at 1230pm and read 199ml. As of 1517 purwick canister remains empty. Doctor notified. No new orders at this time. Will cont to monitor patient. Call light within reach.

## 2022-10-21 NOTE — PROGRESS NOTES
GENERAL SURGERY PROGRESS NOTE    Deidre Ahumada is a 67 y.o. male with right first toe osteomyelitis. He is now status post right first toe amputation on 10/20/2022. .                 Subjective:    Patient was resting in the room when seen. No further evaluation at this time. Objective:    Vitals: VITALS:  /68   Pulse 60   Temp 98.2 °F (36.8 °C) (Oral)   Resp 16   Ht 6' 1\" (1.854 m)   Wt 261 lb 11.2 oz (118.7 kg)   SpO2 100%   BMI 34.53 kg/m²   INTAKE/OUTPUT:    Intake/Output Summary (Last 24 hours) at 10/21/2022 1555  Last data filed at 10/21/2022 1239  Gross per 24 hour   Intake --   Output 2000 ml   Net -2000 ml     TEMPERATURE:  Current - Temp: 98.2 °F (36.8 °C);  Max - Temp  Av.6 °F (36.4 °C)  Min: 96.8 °F (36 °C)  Max: 98.3 °F (36.8 °C)  RESPIRATIONS RANGE: Resp  Av.6  Min: 7  Max: 20  PULSE RANGE: Pulse  Av.4  Min: 60  Max: 70  BLOOD PRESSURE RANGE:  Systolic (44PYE), FZD:196 , Min:100 , JID:480  ; Diastolic (68QTP), URT:67, Min:65, Max:86   PULSE OXIMETRY RANGE: SpO2  Av.3 %  Min: 94 %  Max: 100 %    I/O: 10/20 0701 - 10/21 0700  In: 200 [I.V.:200]  Out: 5507 [Urine:1200]    Labs/Imaging Results:   No new imaging    IV Fluids:   sodium chloride    sodium chloride    sodium chloride    dextrose    Scheduled Meds:   sodium chloride flush, 5-40 mL, IntraVENous, 2 times per day    aspirin, 81 mg, Oral, Daily    clopidogrel, 75 mg, Oral, Daily    [Held by provider] furosemide, 80 mg, IntraVENous, TID    [Held by provider] metOLazone, 5 mg, Oral, Daily    magnesium oxide, 400 mg, Oral, BID    carvedilol, 6.25 mg, Oral, BID    hydrALAZINE, 25 mg, Oral, 3 times per day    isosorbide mononitrate, 30 mg, Oral, Daily    lidocaine PF, 5 mL, IntraDERmal, Once    sodium chloride flush, 5-40 mL, IntraVENous, 2 times per day    atorvastatin, 40 mg, Oral, Nightly    [Held by provider] metaxalone, 800 mg, Oral, TID    tiotropium, 2 puff, Inhalation, Daily    sodium chloride flush, 5-40 mL, IntraVENous, 2 times per day    enoxaparin, 30 mg, SubCUTAneous, BID    insulin lispro, 0-4 Units, SubCUTAneous, TID WC    insulin lispro, 0-4 Units, SubCUTAneous, Nightly    collagenase, , Topical, Daily    Physical Exam:  General: A&O x 3, no distress. HEENT: Anicteric sclerae, MMM. Extremities: Right first toe amputation site with operative dressing in place, no strikethrough on the bandage. Abdomen: Soft, nontender, nondistended.        Assessment and Plan:    Patient Active Problem List:     PAD (peripheral artery disease) ([facility])     Chronic coronary artery disease     Biventricular ICD (implantable cardioverter-defibrillator) in place     Chronic combined systolic and diastolic heart failure ([facility])     Chronic kidney disease, stage III (moderate) ([facility])     Mixed hyperlipidemia     Sick sinus syndrome ([facility])     Type 2 diabetes mellitus with diabetic polyneuropathy ([facility]     Spinal stenosis of lumbar region     Obesity, Class I, BMI 30-34.9     S/P partial colectomy     Tubulovillous adenoma of colon     Microalbuminuria     WD-PVD (peripheral vascular disease) ([facility])     Limb ischemia     Necrotic toes ([facility])     Toe gangrene ([facility])     Diabetic foot infection ([facility]     Chronic kidney disease (CKD) stage G3a/A2, moderately decreased glomerular filtration rate (GFR) between 45-59 mL/min/1.73 square meter and albuminuria creatinine ratio between  mg/g ([facility])     Edema     ICD (implantable cardioverter-defibrillator) battery depletion     Hyperkalemia     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[facility])     Ischemia of toe     Acute kidney injury ([facility]     Fluid overload     DM (diabetes mellitus) ([facility])     Precordial pain     Acute chest pain     Unstable angina ([facility])     Chronic kidney disease (CKD) stage G3a/A3, moderately decreased glomerular filtration rate (GFR) between 45-59 mL/min/1.73 square meter and albuminuria creatinine ratio greater than 300 mg/g ([facility])     Cardiomyopathy ([facility])     Diabetic neuropathy ([facility])     HTN (hypertension)     Epigastric pain     Acute on chronic congestive heart failure (HCC)     Leg edema     Acute on chronic systolic CHF (congestive heart failure) (HCC)     Diabetic foot ulcer with osteomyelitis ([address]     Visit for wound check     Moderate malnutrition ([address]     Long term (current) use of antibiotics     WD-Diabetic ulcer of toe of right foot associated with type 2 diabetes mellitus, with fat layer exposed ([address]     Diabetic ulcer of toe associated with type 2 diabetes mellitus, with bone involvement without evidence of necrosis ([address]     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     Persistent wound pain     Receiving intravenous antibiotic treatment as outpatient     Acute on chronic respiratory failure with hypoxemia ([address]     WD-Chronic foot ulcer, left, with necrosis of bone ([facility]     Acute on chronic HFrEF (heart failure with reduced ejection fraction) ([facility])     Scrotal edema     Hypertensive urgency     Diabetic ulcer of right foot due to type 2 diabetes mellitus ([address]     Cellulitis of foot     Toe osteomyelitis (HCC)     Heart failure exacerbated by sotalol ([facility])     CHF (congestive heart failure), NYHA class I, acute on chronic, combined ([address]     Acute on chronic diastolic CHF (congestive heart failure) ([address]     Leg swelling      Sancho Ma is a 67 y.o. male with right first toe osteomyelitis. He is now status post right first toe amputation on 10/20/2022. Patient resting comfortably when seen.    -Okay for diet as tolerated  -Okay for activity as tolerated  -Wound care:  We will plan to remove operative dressing on postoperative day 2, 10/22/2022.  -Multimodal pain control  -Remaining management per primary team    Discussed with Dr. Gordon Cruz MD

## 2022-10-21 NOTE — PROGRESS NOTES
V2.0  [facility] Hospitalist Progress Note      Name:  Monalisa Noland /Age/Sex: 1950  (67 y.o. male)   MRN & CSN:  [id number] & [id number] Encounter Date/Time: 10/21/2022 2:34 PM EDT    Location:  [address] [place] [address]- PCP: Denver Jacob Day: 11    Assessment and Plan:   Monalisa Noland is a 67 y.o. male with pmh of  who presents with Heart failure exacerbated by sotalol [facility])      Plan:    Severe PAD  CAD              S/p angiogram and PCI to left SFA (10/18/2022)  S/p right great toe amputation   Pending right SFA today               DAPT/Imdur              Long term AC-Xarelto                Acute decompensated HFrEF-biventricular failure s/p AICD-pending interrogation per cards               Cardiology following  Strict I&O's - output yesterday. Appears to be down 14 pounds since admission  Continue Lasix 80 mg 3 times daily/Zaroxolyn  PT/OT recommending SNF but patient refusing     CKD stage 4 baseline (1.7-2.6)  sCr 3.1-worsening               Nephrology following                Bilateral LE Wounds. POA              General surgery following with Right great toe amputation POD#1              Wound team following     Chronic respiratory failure with hypoxia 2/2 COPD  no exacerbation  3 L NC home oxygen, baseline              Continue bronchodilators/pulmonary hygiene     DMII with chronic complications and with long term use of insulin. Last A1C 7.1 (10/12/22)   Monitor FSBS and cover with medium dose SSI              Hold PO medications while inpatient              Continue gabapentin       Diet ADULT DIET;  Regular; 3 carb choices (45 gm/meal)   DVT Prophylaxis [x] Lovenox, []  Heparin, [] SCDs, [] Ambulation,  [] Eliquis, [] Xarelto  [] Coumadin   Code Status Full Code   Disposition From:   Expected Disposition:   Estimated Date of Discharge:   Patient requires continued admission due to further lower extremity procedures    Surrogate Decision Maker/ POA      Subjective:     Chief Complaint: Leg Swelling (B/L)     Patient seen at bedside has been n.p.o. since last night still complains of little bit of leg painOn the right foot. Discussed with him plan for right SFA intervention today        Review of Systems:    Review of Systems    Review of systems is negative except as mentioned above       Objective: Intake/Output Summary (Last 24 hours) at 10/21/2022 1434  Last data filed at 10/21/2022 1239  Gross per 24 hour   Intake --   Output 2000 ml   Net -2000 ml        Vitals:   Vitals:    10/21/22 1156   BP: (!) 145/78   Pulse:    Resp:    Temp: 97.2 °F (36.2 °C)   SpO2: 100%       Physical Exam:     General: NAD  Eyes: EOMI  ENT: neck supple  Cardiovascular: Regular rate. Respiratory: Clear to auscultation  Gastrointestinal: Soft, non tender  Genitourinary: no suprapubic tenderness  Musculoskeletal: No edema  Skin: Bilateral lower extremity foot wounds dressings CDI. Chronic skin changes consistent with venous stasis dermatitis. Postoperative dressing in place  Neuro: Alert. Psych: Mood appropriate.      Medications:   Medications:    sodium chloride flush  5-40 mL IntraVENous 2 times per day    aspirin  81 mg Oral Daily    clopidogrel  75 mg Oral Daily    [Held by provider] furosemide  80 mg IntraVENous TID    [Held by provider] metOLazone  5 mg Oral Daily    magnesium oxide  400 mg Oral BID    carvedilol  6.25 mg Oral BID    hydrALAZINE  25 mg Oral 3 times per day    isosorbide mononitrate  30 mg Oral Daily    lidocaine PF  5 mL IntraDERmal Once    sodium chloride flush  5-40 mL IntraVENous 2 times per day    atorvastatin  40 mg Oral Nightly    [Held by provider] metaxalone  800 mg Oral TID    tiotropium  2 puff Inhalation Daily    sodium chloride flush  5-40 mL IntraVENous 2 times per day    enoxaparin  30 mg SubCUTAneous BID    insulin lispro  0-4 Units SubCUTAneous TID WC    insulin lispro  0-4 Units SubCUTAneous Nightly    collagenase   Topical Daily Infusions:    sodium chloride      sodium chloride      sodium chloride      dextrose       PRN Meds: sodium chloride flush, 5-40 mL, PRN  sodium chloride, , PRN  acetaminophen, 650 mg, Q4H PRN  sodium chloride flush, 5-40 mL, PRN  sodium chloride, , PRN  albuterol sulfate HFA, 2 puff, Q4H PRN  oxyCODONE-acetaminophen, 1 tablet, BID PRN  sodium chloride flush, 5-40 mL, PRN  sodium chloride, , PRN  ondansetron, 4 mg, Q8H PRN   Or  ondansetron, 4 mg, Q6H PRN  glucose, 4 tablet, PRN  dextrose bolus, 125 mL, PRN   Or  dextrose bolus, 250 mL, PRN  glucagon (rDNA), 1 mg, PRN  dextrose, , Continuous PRN        Labs      Recent Results (from the past 24 hour(s))   POCT Glucose    Collection Time: 10/20/22  5:45 PM   Result Value Ref Range    POC Glucose 203 (H) 70 - 99 MG/DL   POCT Glucose    Collection Time: 10/20/22  9:52 PM   Result Value Ref Range    POC Glucose 248 (H) 70 - 99 MG/DL   POCT Glucose    Collection Time: 10/21/22  6:47 AM   Result Value Ref Range    POC Glucose 176 (H) 70 - 99 MG/DL   CBC    Collection Time: 10/21/22  7:42 AM   Result Value Ref Range    WBC 7.9 4.0 - 10.5 K/CU MM    RBC 3.67 (L) 4.6 - 6.2 M/CU MM    Hemoglobin 10.0 (L) 13.5 - 18.0 GM/DL    Hematocrit 33.9 (L) 42 - 52 %    MCV 92.4 78 - 100 FL    MCH 27.2 27 - 31 PG    MCHC 29.5 (L) 32.0 - 36.0 %    RDW 18.3 (H) 11.7 - 14.9 %    Platelets 926 987 - 174 K/CU MM    MPV 9.6 7.5 - 11.1 FL   Comprehensive Metabolic Panel    Collection Time: 10/21/22  7:42 AM   Result Value Ref Range    Sodium 139 135 - 145 MMOL/L    Potassium 5.0 3.5 - 5.1 MMOL/L    Chloride 98 (L) 99 - 110 mMol/L    CO2 32 21 - 32 MMOL/L    BUN 66 (H) 6 - 23 MG/DL    Creatinine 3.1 (H) 0.9 - 1.3 MG/DL    Est, Glom Filt Rate 21 (L) >60 mL/min/1.73m2    Glucose 146 (H) 70 - 99 MG/DL    Calcium 8.1 (L) 8.3 - 10.6 MG/DL    Albumin 2.9 (L) 3.4 - 5.0 GM/DL    Total Protein 6.4 6.4 - 8.2 GM/DL    Total Bilirubin 0.9 0.0 - 1.0 MG/DL    ALT 11 10 - 40 U/L    AST 22 15 - 37 IU/L Alkaline Phosphatase 76 40 - 128 IU/L    Anion Gap 9 4 - 16   POCT Glucose    Collection Time: 10/21/22  1:29 PM   Result Value Ref Range    POC Glucose 205 (H) 70 - 99 MG/DL        Imaging/Diagnostics Last 24 Hours   No results found.     Electronically signed by Vicky Ferreira MD on 10/21/2022 at 2:34 PM

## 2022-10-21 NOTE — CARE COORDINATION
CM met with pt to f/u for d/c decision. Pt states that he does not want to go to a SNF and is going home with his family. He states that he has 24/7 care provided by family. Pt denies any new d/c needs. D/c plan is home with family and [id number]. Notify CM if any new d/c needs arise. TE      .Please notify [facility] upon discharge at [phone] an fax the AVS and [facility] orders to [phone].

## 2022-10-21 NOTE — ANESTHESIA POSTPROCEDURE EVALUATION
Department of Anesthesiology  Postprocedure Note    Patient: Shan Phillip  MRN: [id number]  YOB: 1950  Date of evaluation: 10/20/2022      Procedure Summary     Date: 10/20/22 Room / Location: [address] [place] [address]    Anesthesia Start: 1375 Anesthesia Stop: Sarah Mcguire    Procedure: Right First TOE AMPUTATION (Right: First Toe) Diagnosis:       Diabetic foot ulcer with osteomyelitis ([address]      (Right First Toe Diabetic Ulcer, Osteomyelitis)    Surgeons: Andrew Art MD Responsible Provider: Carli Christian MD    Anesthesia Type: General ASA Status: 4          Anesthesia Type: General    Talat Phase I: Talat Score: 9    Talat Phase II:        Anesthesia Post Evaluation    Patient location during evaluation: PACU  Patient participation: complete - patient participated  Level of consciousness: awake and alert  Pain score: 0  Airway patency: patent  Nausea & Vomiting: no nausea and no vomiting  Complications: no  Cardiovascular status: blood pressure returned to baseline  Respiratory status: acceptable  Hydration status: euvolemic

## 2022-10-22 LAB
ALBUMIN SERPL-MCNC: 2.8 GM/DL (ref 3.4–5)
ALBUMIN SERPL-MCNC: 2.8 GM/DL (ref 3.4–5)
ALP BLD-CCNC: 66 IU/L (ref 40–128)
ALT SERPL-CCNC: 8 U/L (ref 10–40)
ANION GAP SERPL CALCULATED.3IONS-SCNC: 7 MMOL/L (ref 4–16)
ANION GAP SERPL CALCULATED.3IONS-SCNC: 8 MMOL/L (ref 4–16)
AST SERPL-CCNC: 12 IU/L (ref 15–37)
BASOPHILS ABSOLUTE: 0 K/CU MM
BASOPHILS RELATIVE PERCENT: 0.5 % (ref 0–1)
BILIRUB SERPL-MCNC: 0.7 MG/DL (ref 0–1)
BUN BLDV-MCNC: 70 MG/DL (ref 6–23)
BUN BLDV-MCNC: 72 MG/DL (ref 6–23)
CALCIUM SERPL-MCNC: 7.7 MG/DL (ref 8.3–10.6)
CALCIUM SERPL-MCNC: 7.7 MG/DL (ref 8.3–10.6)
CHLORIDE BLD-SCNC: 96 MMOL/L (ref 99–110)
CHLORIDE BLD-SCNC: 96 MMOL/L (ref 99–110)
CO2: 31 MMOL/L (ref 21–32)
CO2: 32 MMOL/L (ref 21–32)
CREAT SERPL-MCNC: 3.6 MG/DL (ref 0.9–1.3)
CREAT SERPL-MCNC: 3.7 MG/DL (ref 0.9–1.3)
DIFFERENTIAL TYPE: ABNORMAL
EOSINOPHILS ABSOLUTE: 0.1 K/CU MM
EOSINOPHILS RELATIVE PERCENT: 1.3 % (ref 0–3)
GFR SERPL CREATININE-BSD FRML MDRD: 17 ML/MIN/1.73M2
GFR SERPL CREATININE-BSD FRML MDRD: 17 ML/MIN/1.73M2
GLUCOSE BLD-MCNC: 155 MG/DL (ref 70–99)
GLUCOSE BLD-MCNC: 163 MG/DL (ref 70–99)
GLUCOSE BLD-MCNC: 165 MG/DL (ref 70–99)
GLUCOSE BLD-MCNC: 170 MG/DL (ref 70–99)
GLUCOSE BLD-MCNC: 194 MG/DL (ref 70–99)
GLUCOSE BLD-MCNC: 248 MG/DL (ref 70–99)
HCT VFR BLD CALC: 28.5 % (ref 42–52)
HEMOGLOBIN: 8.5 GM/DL (ref 13.5–18)
IMMATURE NEUTROPHIL %: 0.2 % (ref 0–0.43)
LYMPHOCYTES ABSOLUTE: 1.3 K/CU MM
LYMPHOCYTES RELATIVE PERCENT: 20.9 % (ref 24–44)
MCH RBC QN AUTO: 26.9 PG (ref 27–31)
MCHC RBC AUTO-ENTMCNC: 29.8 % (ref 32–36)
MCV RBC AUTO: 90.2 FL (ref 78–100)
MONOCYTES ABSOLUTE: 0.6 K/CU MM
MONOCYTES RELATIVE PERCENT: 9.4 % (ref 0–4)
NUCLEATED RBC %: 0 %
PDW BLD-RTO: 18.3 % (ref 11.7–14.9)
PHOSPHORUS: 4.2 MG/DL (ref 2.5–4.9)
PLATELET # BLD: 232 K/CU MM (ref 140–440)
PMV BLD AUTO: 10.1 FL (ref 7.5–11.1)
POTASSIUM SERPL-SCNC: 4.9 MMOL/L (ref 3.5–5.1)
POTASSIUM SERPL-SCNC: 5.2 MMOL/L (ref 3.5–5.1)
RBC # BLD: 3.16 M/CU MM (ref 4.6–6.2)
SEGMENTED NEUTROPHILS ABSOLUTE COUNT: 4.1 K/CU MM
SEGMENTED NEUTROPHILS RELATIVE PERCENT: 67.7 % (ref 36–66)
SODIUM BLD-SCNC: 135 MMOL/L (ref 135–145)
SODIUM BLD-SCNC: 135 MMOL/L (ref 135–145)
TOTAL IMMATURE NEUTOROPHIL: 0.01 K/CU MM
TOTAL NUCLEATED RBC: 0 K/CU MM
TOTAL PROTEIN: 6 GM/DL (ref 6.4–8.2)
WBC # BLD: 6.1 K/CU MM (ref 4–10.5)

## 2022-10-22 PROCEDURE — 6370000000 HC RX 637 (ALT 250 FOR IP): Performed by: SURGERY

## 2022-10-22 PROCEDURE — 94664 DEMO&/EVAL PT USE INHALER: CPT

## 2022-10-22 PROCEDURE — 80053 COMPREHEN METABOLIC PANEL: CPT

## 2022-10-22 PROCEDURE — 6360000002 HC RX W HCPCS: Performed by: SURGERY

## 2022-10-22 PROCEDURE — 94640 AIRWAY INHALATION TREATMENT: CPT

## 2022-10-22 PROCEDURE — 36415 COLL VENOUS BLD VENIPUNCTURE: CPT

## 2022-10-22 PROCEDURE — 85025 COMPLETE CBC W/AUTO DIFF WBC: CPT

## 2022-10-22 PROCEDURE — 94761 N-INVAS EAR/PLS OXIMETRY MLT: CPT

## 2022-10-22 PROCEDURE — 80069 RENAL FUNCTION PANEL: CPT

## 2022-10-22 PROCEDURE — 2700000000 HC OXYGEN THERAPY PER DAY

## 2022-10-22 PROCEDURE — APPSS60 APP SPLIT SHARED TIME 46-60 MINUTES: Performed by: NURSE PRACTITIONER

## 2022-10-22 PROCEDURE — 2140000000 HC CCU INTERMEDIATE R&B

## 2022-10-22 PROCEDURE — 82962 GLUCOSE BLOOD TEST: CPT

## 2022-10-22 PROCEDURE — 2580000003 HC RX 258: Performed by: SURGERY

## 2022-10-22 RX ADMIN — SODIUM CHLORIDE, PRESERVATIVE FREE 5 ML: 5 INJECTION INTRAVENOUS at 20:38

## 2022-10-22 RX ADMIN — ISOSORBIDE MONONITRATE 30 MG: 30 TABLET, EXTENDED RELEASE ORAL at 09:27

## 2022-10-22 RX ADMIN — Medication 400 MG: at 20:29

## 2022-10-22 RX ADMIN — OXYCODONE AND ACETAMINOPHEN 1 TABLET: 5; 325 TABLET ORAL at 20:27

## 2022-10-22 RX ADMIN — TIOTROPIUM BROMIDE INHALATION SPRAY 2 PUFF: 3.12 SPRAY, METERED RESPIRATORY (INHALATION) at 12:05

## 2022-10-22 RX ADMIN — HYDRALAZINE HYDROCHLORIDE 25 MG: 25 TABLET ORAL at 22:42

## 2022-10-22 RX ADMIN — ENOXAPARIN SODIUM 30 MG: 100 INJECTION SUBCUTANEOUS at 09:27

## 2022-10-22 RX ADMIN — COLLAGENASE SANTYL: 250 OINTMENT TOPICAL at 09:35

## 2022-10-22 RX ADMIN — CLOPIDOGREL BISULFATE 75 MG: 75 TABLET ORAL at 09:27

## 2022-10-22 RX ADMIN — ASPIRIN 81 MG CHEWABLE TABLET 81 MG: 81 TABLET CHEWABLE at 09:27

## 2022-10-22 RX ADMIN — ONDANSETRON 4 MG: 2 INJECTION INTRAMUSCULAR; INTRAVENOUS at 15:47

## 2022-10-22 RX ADMIN — Medication 400 MG: at 09:27

## 2022-10-22 RX ADMIN — INSULIN LISPRO 1 UNITS: 100 INJECTION, SOLUTION INTRAVENOUS; SUBCUTANEOUS at 17:08

## 2022-10-22 RX ADMIN — ATORVASTATIN CALCIUM 40 MG: 40 TABLET, FILM COATED ORAL at 20:29

## 2022-10-22 RX ADMIN — SODIUM CHLORIDE, PRESERVATIVE FREE 10 ML: 5 INJECTION INTRAVENOUS at 09:33

## 2022-10-22 RX ADMIN — CARVEDILOL 6.25 MG: 6.25 TABLET, FILM COATED ORAL at 09:27

## 2022-10-22 RX ADMIN — ENOXAPARIN SODIUM 30 MG: 100 INJECTION SUBCUTANEOUS at 20:30

## 2022-10-22 ASSESSMENT — PAIN SCALES - GENERAL
PAINLEVEL_OUTOF10: 5
PAINLEVEL_OUTOF10: 2
PAINLEVEL_OUTOF10: 0

## 2022-10-22 ASSESSMENT — PAIN DESCRIPTION - DESCRIPTORS: DESCRIPTORS: DISCOMFORT

## 2022-10-22 ASSESSMENT — PAIN DESCRIPTION - ORIENTATION: ORIENTATION: MID

## 2022-10-22 ASSESSMENT — PAIN DESCRIPTION - LOCATION: LOCATION: COCCYX

## 2022-10-22 NOTE — PROGRESS NOTES
V2.0  [facility] [place]      Name:  Yoseph Liang /Age/Sex: 1950  (67 y.o. male)   MRN & CSN:  [id number] & [id number] Encounter Date/Time: 10/22/2022 2:34 PM EDT    Location:  [address] [place] [address]- PCP: Myrtle Blackwood Day: 12    Assessment and Plan:   Yoseph Liang is a 67 y.o. male with pmh of  who presents with Heart failure exacerbated by sotalol ([address]      Plan:    Severe PAD  CAD              S/p angiogram and PCI to left SFA (10/18/2022)  S/p right great toe amputation   arterial doppler suggested diminished blood flow to the feet, will proceed with lower extermtiy angiogram  Plan to proceed with right SFA intervention once cleared by general surgery for bleeding   DAPT/Imdur  Long term AC-Xarelto                Acute decompensated HFrEF-biventricular failure s/p AICD-pending interrogation per cards               Cardiology following  Strict I&O's - output yesterday. Appears to be down 14 pounds since admission  Continue Lasix 80 mg 3 times daily/Zaroxolyn  PT/OT recommending SNF but patient refusing     CKD stage 4 baseline (1.7-2.6)  sCr 3.1-worsening               Nephrology following                Bilateral LE Wounds. POA              General surgery following with Right great toe amputation POD#1              Wound team following     Chronic respiratory failure with hypoxia 2/2 COPD  no exacerbation  3 L NC home oxygen, baseline              Continue bronchodilators/pulmonary hygiene     DMII with chronic complications and with long term use of insulin. Last A1C 7.1 (10/12/22)   Monitor FSBS and cover with medium dose SSI              Hold PO medications while inpatient              Continue gabapentin       Diet ADULT DIET;  Regular; 3 carb choices (45 gm/meal)   DVT Prophylaxis [x] Lovenox, []  Heparin, [] SCDs, [] Ambulation,  [] Eliquis, [] Xarelto  [] Coumadin   Code Status Full Code   Disposition From:   Expected Disposition:   Estimated Date of Discharge: Patient requires continued admission due to further lower extremity procedures    Surrogate Decision Maker/ POA      Subjective:     Chief Complaint: Leg Swelling (B/L)     Patient seen at bedside continues to be on IV Lasix. He had arterial Doppler yesterday that showed diminished blood flow to the feet plan is to proceed with an angiogram once clearance from general surgery in regards to the bleeding for his amputation. Patient denies any pain at the site of the amputation denies nausea vomiting abdominal pain or any other complaints       Review of Systems:    Review of Systems    Review of systems is negative except as mentioned above       Objective: Intake/Output Summary (Last 24 hours) at 10/22/2022 0837  Last data filed at 10/22/2022 0400  Gross per 24 hour   Intake --   Output 1100 ml   Net -1100 ml          Vitals:   Vitals:    10/22/22 0400   BP: 103/69   Pulse: 60   Resp: 16   Temp: 97.1 °F (36.2 °C)   SpO2: 96%       Physical Exam:     General: NAD  Eyes: EOMI  ENT: neck supple  Cardiovascular: Regular rate. Respiratory: Clear to auscultation  Gastrointestinal: Soft, non tender  Genitourinary: no suprapubic tenderness  Musculoskeletal: No edema  Skin: Bilateral lower extremity foot wounds dressings CDI. Chronic skin changes consistent with venous stasis dermatitis. Postoperative dressing in place  Neuro: Alert. Psych: Mood appropriate.      Medications:   Medications:    sodium chloride flush  5-40 mL IntraVENous 2 times per day    aspirin  81 mg Oral Daily    clopidogrel  75 mg Oral Daily    [Held by provider] furosemide  80 mg IntraVENous TID    [Held by provider] metOLazone  5 mg Oral Daily    magnesium oxide  400 mg Oral BID    carvedilol  6.25 mg Oral BID    hydrALAZINE  25 mg Oral 3 times per day    isosorbide mononitrate  30 mg Oral Daily    lidocaine PF  5 mL IntraDERmal Once    sodium chloride flush  5-40 mL IntraVENous 2 times per day    atorvastatin  40 mg Oral Nightly  [Held by provider] metaxalone  800 mg Oral TID    tiotropium  2 puff Inhalation Daily    sodium chloride flush  5-40 mL IntraVENous 2 times per day    enoxaparin  30 mg SubCUTAneous BID    insulin lispro  0-4 Units SubCUTAneous TID WC    insulin lispro  0-4 Units SubCUTAneous Nightly    collagenase   Topical Daily      Infusions:    sodium chloride      sodium chloride      sodium chloride      dextrose       PRN Meds: sodium chloride flush, 5-40 mL, PRN  sodium chloride, , PRN  acetaminophen, 650 mg, Q4H PRN  sodium chloride flush, 5-40 mL, PRN  sodium chloride, , PRN  albuterol sulfate HFA, 2 puff, Q4H PRN  oxyCODONE-acetaminophen, 1 tablet, BID PRN  sodium chloride flush, 5-40 mL, PRN  sodium chloride, , PRN  ondansetron, 4 mg, Q8H PRN   Or  ondansetron, 4 mg, Q6H PRN  glucose, 4 tablet, PRN  dextrose bolus, 125 mL, PRN   Or  dextrose bolus, 250 mL, PRN  glucagon (rDNA), 1 mg, PRN  dextrose, , Continuous PRN      Labs      Recent Results (from the past 24 hour(s))   POCT Glucose    Collection Time: 10/21/22  1:29 PM   Result Value Ref Range    POC Glucose 205 (H) 70 - 99 MG/DL   POCT Glucose    Collection Time: 10/21/22  4:22 PM   Result Value Ref Range    POC Glucose 200 (H) 70 - 99 MG/DL   POCT Glucose    Collection Time: 10/21/22  7:50 PM   Result Value Ref Range    POC Glucose 230 (H) 70 - 99 MG/DL   POCT Glucose    Collection Time: 10/22/22  6:53 AM   Result Value Ref Range    POC Glucose 163 (H) 70 - 99 MG/DL        Imaging/Diagnostics Last 24 Hours   No results found.     Electronically signed by Mason Pandey MD on 10/22/2022 at 8:37 AM

## 2022-10-22 NOTE — PROGRESS NOTES
Nephrology Progress Note  10/22/2022 8:27 AM  Subjective: Interval History: Krysta Galaviz is a 67 y.o. male with overall doing well no acute distress resting well and not started back on diuretics at      Data:   Scheduled Meds:   sodium chloride flush  5-40 mL IntraVENous 2 times per day    aspirin  81 mg Oral Daily    clopidogrel  75 mg Oral Daily    [Held by provider] furosemide  80 mg IntraVENous TID    [Held by provider] metOLazone  5 mg Oral Daily    magnesium oxide  400 mg Oral BID    carvedilol  6.25 mg Oral BID    hydrALAZINE  25 mg Oral 3 times per day    isosorbide mononitrate  30 mg Oral Daily    lidocaine PF  5 mL IntraDERmal Once    sodium chloride flush  5-40 mL IntraVENous 2 times per day    atorvastatin  40 mg Oral Nightly    [Held by provider] metaxalone  800 mg Oral TID    tiotropium  2 puff Inhalation Daily    sodium chloride flush  5-40 mL IntraVENous 2 times per day    enoxaparin  30 mg SubCUTAneous BID    insulin lispro  0-4 Units SubCUTAneous TID WC    insulin lispro  0-4 Units SubCUTAneous Nightly    collagenase   Topical Daily     Continuous Infusions:   sodium chloride      sodium chloride      sodium chloride      dextrose           CBC   Recent Labs     10/20/22  1020 10/21/22  0742   WBC 7.0 7.9   HGB 8.7* 10.0*   HCT 29.2* 33.9*    275      BMP   Recent Labs     10/20/22  1020 10/21/22  0742    139   K 5.0 5.0    98*   CO2 33* 32   BUN 58* 66*   CREATININE 2.9* 3.1*     Hepatic:   Recent Labs     10/20/22  1020 10/21/22  0742   AST 11* 22   ALT <5* 11   BILITOT 0.8 0.9   ALKPHOS 70 76     Troponin: No results for input(s): TROPONINI in the last 72 hours. BNP: No results for input(s): BNP in the last 72 hours. Lipids: No results for input(s): CHOL, HDL in the last 72 hours. Invalid input(s): LDLCALCU  ABGs: No results found for: PHART, PO2ART, MLU4JZJ  INR: No results for input(s): INR in the last 72 hours.   Renal Labs  Albumin:    Lab Results   Component Value Date/Time    LABALBU 2.9 10/21/2022 07:42 AM     Calcium:    Lab Results   Component Value Date/Time    CALCIUM 8.1 10/21/2022 07:42 AM     Phosphorus:    Lab Results   Component Value Date/Time    PHOS 3.5 10/14/2022 05:40 AM     U/A:    Lab Results   Component Value Date/Time    NITRU NEGATIVE 10/13/2022 05:15 PM    COLORU YELLOW 10/13/2022 05:15 PM    PHUR 5.0 08/19/2016 09:38 AM    WBCUA <1 10/13/2022 05:15 PM    RBCUA NONE SEEN 10/13/2022 05:15 PM    MUCUS RARE 01/23/2022 02:25 PM    TRICHOMONAS NONE SEEN 10/13/2022 05:15 PM    BACTERIA NEGATIVE 10/13/2022 05:15 PM    CLARITYU CLEAR 10/13/2022 05:15 PM    SPECGRAV 1.015 10/13/2022 05:15 PM    UROBILINOGEN 0.2 10/13/2022 05:15 PM    BILIRUBINUR NEGATIVE 10/13/2022 05:15 PM    BLOODU NEGATIVE 10/13/2022 05:15 PM    KETUA NEGATIVE 10/13/2022 05:15 PM     ABG:  No results found for: PHART, FRL1UKZ, PO2ART, UJI3CGT, BEART, THGBART, TZE9UIG, S2SPDKBJ  HgBA1c:    Lab Results   Component Value Date/Time    LABA1C 7.1 10/12/2022 06:28 AM     Microalbumen/Creatinine ratio:  No components found for: RUCREAT  TSH:  No results found for: TSH  IRON:    Lab Results   Component Value Date/Time    IRON 30 01/27/2022 03:37 AM     Iron Saturation:  No components found for: PERCENTFE  TIBC:    Lab Results   Component Value Date/Time    TIBC 182 01/27/2022 03:37 AM     FERRITIN:    Lab Results   Component Value Date/Time    FERRITIN 102 06/08/2021 08:30 AM     RPR:  No results found for: RPR  TIGIST:  No results found for: ANATITER, TIGIST  24 Hour Urine for Creatinine Clearance:  No components found for: CREAT4, UHRS10, UTV10      Objective:   I/O: 10/21 0701 - 10/22 0700  In: -   Out: 1100 [Urine:1100]  I/O last 3 completed shifts:  In: -   Out: 2300 [Urine:2300]  No intake/output data recorded.   Vitals: /69   Pulse 60   Temp 97.1 °F (36.2 °C) (Oral)   Resp 16   Ht 6' 1\" (1.854 m)   Wt 261 lb (118.4 kg)   SpO2 96%   BMI 34.43 kg/m²  {  General appearance: awake weak  HEENT: Head: Normal, normocephalic, atraumatic.   Neck: supple, symmetrical, trachea midline  Lungs: diminished breath sounds bilaterally  Heart: S1, S2 normal  Abdomen: abnormal findings:  soft nt  Extremities: edema trace status post surgery wrap legs  Neurologic: Mental status: alertness: alert        Assessment and Plan:      IMP:  #1 acute renal failure from risk of ATN and contrast on CKD 4  2 congestive heart failure with atherosclerotic cardiovascular disease  #3 soft tissue infection with amputation first toe osteomyelitis  #4 anemia  #5 hypotension    Plan     #1 creatinine 3.1 yesterday await repeat labs good urine output hold on diuretics for now  #2 cardiac status monitor felt not short of breath supportive care for now  #3 status post amputation October 20 follow-up plan no fever possible needed angiogram per cardiology when more stable will hold for now  #4 hemoglobin is stable  #5 blood pressure stable low    We will follow monitor supportive care no acute dialysis for now           Beni Marroquin MD, MD

## 2022-10-22 NOTE — PLAN OF CARE
Problem: Discharge Planning  Goal: Discharge to home or other facility with appropriate resources  Outcome: Progressing  Flowsheets (Taken 10/22/2022 0915)  Discharge to home or other facility with appropriate resources:   Identify barriers to discharge with patient and caregiver   Arrange for needed discharge resources and transportation as appropriate   Identify discharge learning needs (meds, wound care, etc)   Arrange for interpreters to assist at discharge as needed     Problem: Pain  Goal: Verbalizes/displays adequate comfort level or baseline comfort level  Outcome: Progressing  Flowsheets (Taken 10/22/2022 0915)  Verbalizes/displays adequate comfort level or baseline comfort level:   Encourage patient to monitor pain and request assistance   Assess pain using appropriate pain scale   Administer analgesics based on type and severity of pain and evaluate response   Implement non-pharmacological measures as appropriate and evaluate response     Problem: Skin/Tissue Integrity  Goal: Absence of new skin breakdown  Description: 1. Monitor for areas of redness and/or skin breakdown  2. Assess vascular access sites hourly  3. Every 4-6 hours minimum:  Change oxygen saturation probe site  4. Every 4-6 hours:  If on nasal continuous positive airway pressure, respiratory therapy assess nares and determine need for appliance change or resting period.   Outcome: Progressing     Problem: Safety - Adult  Goal: Free from fall injury  Outcome: Progressing     Problem: Chronic Conditions and Co-morbidities  Goal: Patient's chronic conditions and co-morbidity symptoms are monitored and maintained or improved  Outcome: Progressing  Flowsheets (Taken 10/22/2022 0915)  Care Plan - Patient's Chronic Conditions and Co-Morbidity Symptoms are Monitored and Maintained or Improved:   Monitor and assess patient's chronic conditions and comorbid symptoms for stability, deterioration, or improvement   Collaborate with multidisciplinary team to address chronic and comorbid conditions and prevent exacerbation or deterioration   Update acute care plan with appropriate goals if chronic or comorbid symptoms are exacerbated and prevent overall improvement and discharge     Problem: Nutrition Deficit:  Goal: Optimize nutritional status  Outcome: Progressing

## 2022-10-22 NOTE — PROGRESS NOTES
Cardiology Progress Note     Today's Plan: Continue present management. Admit Date:  10/11/2022    Consult reason/ Seen today for: CHF    Subjective and  Overnight Events: Patient denies any shortness of breath or chest pain. Tenderness to lower extremities noted. Patient has had limited amount of mobility due to recent surgery. History of Presenting Illness:    Chief complain on admission : 67 y. o.year old who is admitted for  Chief Complaint   Patient presents with    Leg Swelling     B/L        Past medical history:    has a past medical history of Acid reflux, Acute MI ([address] Arthritis, Broken teeth, CAD (coronary artery disease), Cardiomyopathy ([address] CHF (congestive heart failure) ([address] Chronic back pain, Chronic kidney disease, Diabetes mellitus ([address] Diabetic neuropathy ([address] H/O cardiovascular stress test, H/O Doppler ultrasound, H/O percutaneous left heart catheterization, History of irregular heartbeat, History of syncope, Hyperlipidemia, Hypertension, Leg swelling, Necrotic toes (HCC), Neuropathy, neuropathy, PAD (peripheral artery disease) ([address] PVD (peripheral vascular disease) ([address] Sick sinus syndrome ([address] Sleep apnea, Spinal stenosis, Teeth missing, Type 2 diabetes mellitus without complication ([address] and WD-Chronic foot ulcer, left, with necrosis of bone ([address] Past surgical history:   has a past surgical history that includes Coronary angioplasty with stent; Dental surgery; Colonoscopy (08/04/2016); pacemaker placement (06/04/2010); vascular surgery; colectomy (Right, 08/26/2016); Toe amputation (Right, 09/12/2017); Toe amputation (Right, 01/09/2018); Cardiac catheterization; Cardiac defibrillator placement (06/04/2010); Coronary angioplasty; Cardiac catheterization (07/14/2017); Cardiac catheterization (11/20/2018); Toe amputation (Left, 12/26/2020);  IR TUNNELED CVC PLACE WO SQ PORT/PUMP > 5 YEARS (6/14/2021); Toe amputation (Left, 7/26/2022); and Toe amputation (Right, 10/20/2022). Social History:   reports that he quit smoking about a year ago. His smoking use included cigars and cigarettes. He started smoking about 42 years ago. He has a 9.00 pack-year smoking history. He has never used smokeless tobacco. He reports current drug use. Drug: Marijuana Jenet Trya). He reports that he does not drink alcohol. Family history:  family history includes Cancer in his brother, father, and son; Diabetes in his brother, mother, and sister; Early Death (age of onset: 62) in his sister; Heart Disease in his brother and sister; High Blood Pressure in his brother, brother, and mother; Neuropathy in his sister; Other in his sister; Stroke in his mother; Vision Loss in his mother. Allergies   Allergen Reactions    Pcn [Penicillins] Hives    Fentanyl Itching       Review of Systems:  Review of Systems   Cardiovascular:  Negative for chest pain, palpitations and leg swelling. Musculoskeletal: Negative. Neurological:  Negative for dizziness and weakness. All other systems reviewed and are negative. /63   Pulse 60   Temp 97.4 °F (36.3 °C) (Oral)   Resp 18   Ht 6' 1\" (1.854 m)   Wt 261 lb (118.4 kg)   SpO2 92%   BMI 34.43 kg/m²     Intake/Output Summary (Last 24 hours) at 10/22/2022 1226  Last data filed at 10/22/2022 0933  Gross per 24 hour   Intake 5 ml   Output 1100 ml   Net -1095 ml       Physical Exam:  Physical Exam  Constitutional:       Appearance: He is well-developed. Cardiovascular:      Rate and Rhythm: Normal rate and regular rhythm. Pulses: Intact distal pulses. Decreased pulses. Heart sounds: Normal heart sounds, S1 normal and S2 normal.   Pulmonary:      Effort: Pulmonary effort is normal.      Breath sounds: Normal breath sounds. Musculoskeletal:         General: Normal range of motion. Skin:     General: Skin is warm and dry.    Neurological:      Mental Status: He is alert and oriented to person, place, and time. Medications:    sodium chloride flush  5-40 mL IntraVENous 2 times per day    aspirin  81 mg Oral Daily    clopidogrel  75 mg Oral Daily    [Held by provider] furosemide  80 mg IntraVENous TID    [Held by provider] metOLazone  5 mg Oral Daily    magnesium oxide  400 mg Oral BID    carvedilol  6.25 mg Oral BID    hydrALAZINE  25 mg Oral 3 times per day    isosorbide mononitrate  30 mg Oral Daily    lidocaine PF  5 mL IntraDERmal Once    sodium chloride flush  5-40 mL IntraVENous 2 times per day    atorvastatin  40 mg Oral Nightly    [Held by provider] metaxalone  800 mg Oral TID    tiotropium  2 puff Inhalation Daily    sodium chloride flush  5-40 mL IntraVENous 2 times per day    enoxaparin  30 mg SubCUTAneous BID    insulin lispro  0-4 Units SubCUTAneous TID WC    insulin lispro  0-4 Units SubCUTAneous Nightly    collagenase   Topical Daily      sodium chloride      sodium chloride      sodium chloride      dextrose       sodium chloride flush, sodium chloride, acetaminophen, sodium chloride flush, sodium chloride, albuterol sulfate HFA, oxyCODONE-acetaminophen, sodium chloride flush, sodium chloride, ondansetron **OR** ondansetron, glucose, dextrose bolus **OR** dextrose bolus, glucagon (rDNA), dextrose    Lab Data:  CBC:   Recent Labs     10/20/22  1020 10/21/22  0742   WBC 7.0 7.9   HGB 8.7* 10.0*   HCT 29.2* 33.9*   MCV 91.5 92.4    275     BMP:   Recent Labs     10/20/22  1020 10/21/22  0742 10/22/22  0810    139 135   K 5.0 5.0 4.9    98* 96*   CO2 33* 32 32   BUN 58* 66* 72*   CREATININE 2.9* 3.1* 3.6*     PT/INR: No results for input(s): PROTIME, INR in the last 72 hours. BNP:  No results for input(s): PROBNP in the last 72 hours. TROPONIN: No results for input(s): TROPONINT in the last 72 hours.      Impression:  Principal Problem:    Heart failure exacerbated by sotalol [facility])  Active Problems:    Precordial pain    Diabetic ulcer of right foot due to type 2 diabetes mellitus (HCC)    CHF (congestive heart failure), NYHA class I, acute on chronic, combined (HCC)    Acute on chronic diastolic CHF (congestive heart failure) (HCC)    Leg swelling    PAD (peripheral artery disease) (HCC)    Biventricular ICD (implantable cardioverter-defibrillator) in place  Resolved Problems:    * No resolved hospital problems. *        Assessment / Plan / Recommendation:     CHF: right sided failure; s/p milrinone and Lasix drip. Patient appears euvolemic at this time. Nephrology managing diuretics. May with hydralazine, Imdur, Coreg. No MRA/ACE/ARB/ARNi due to CKD. PAD: S/p angiogram and PCI of left SFA 10/18/2022. S/p right great toe amputation 10/20/22. Outpatient right SFA intervention. CKD: nephrology managing. Osteomyelitis great right toe, s/p amputation     All labs, medications and tests reviewed by myself, continue all other medications of all above medical condition listed as is except for changes mentioned above. Thank you   Please call with questions. Electronically signed by Ramiro Chavez. Mary Kumar, APRN - CNP on 10/22/2022 at 12:26 PM       [facility];        . Patient is clinically stable. With hydralazine Imdur and Coreg. HPI:    Chief Complaint   Patient presents with    Leg Swelling     B/L     Please review addendum/changes made to note above   Interval history:    Physical Exam:  General:  Awake  Head:normal  Eye:normal  Neck:  No JVD   Chest:  Clear to auscultation, respiration easy  Cardiovascular: Pulse abdomen:   nontender  Extremities: No edema           I agree with the plan, which was planned by myself and discussed with advanced level provider. My documented MDM is a substantive portion of the supervisory note. I have seen ,spoken to  and examined this patient personally, independently of the advanced level provider.   I have spent substantiate  portion of this encounter independently myself in examining patient and developing the medical management plan . I have reviewed the hospital care given to date and reviewed all pertinent labs and imaging. The plan was developed mutually at the time of the visit with the patient,  NP /PA  and myself. I have spoken with patient, nursing staff and provided written and verbal instructions . The above note has been reviewed and I agree with the assessment, diagnosis, and treatment plan with changes made by me as follows .     Cristian Dotson MD

## 2022-10-23 LAB
ALBUMIN SERPL-MCNC: 2.8 GM/DL (ref 3.4–5)
ANION GAP SERPL CALCULATED.3IONS-SCNC: 12 MMOL/L (ref 4–16)
ANION GAP SERPL CALCULATED.3IONS-SCNC: 7 MMOL/L (ref 4–16)
BUN BLDV-MCNC: 72 MG/DL (ref 6–23)
BUN BLDV-MCNC: 72 MG/DL (ref 6–23)
CALCIUM SERPL-MCNC: 7.7 MG/DL (ref 8.3–10.6)
CALCIUM SERPL-MCNC: 7.8 MG/DL (ref 8.3–10.6)
CHLORIDE BLD-SCNC: 95 MMOL/L (ref 99–110)
CHLORIDE BLD-SCNC: 96 MMOL/L (ref 99–110)
CO2: 26 MMOL/L (ref 21–32)
CO2: 32 MMOL/L (ref 21–32)
CREAT SERPL-MCNC: 3.6 MG/DL (ref 0.9–1.3)
CREAT SERPL-MCNC: 3.8 MG/DL (ref 0.9–1.3)
FERRITIN: 66 NG/ML (ref 30–400)
GFR SERPL CREATININE-BSD FRML MDRD: 16 ML/MIN/1.73M2
GFR SERPL CREATININE-BSD FRML MDRD: 17 ML/MIN/1.73M2
GLUCOSE BLD-MCNC: 143 MG/DL (ref 70–99)
GLUCOSE BLD-MCNC: 153 MG/DL (ref 70–99)
GLUCOSE BLD-MCNC: 162 MG/DL (ref 70–99)
GLUCOSE BLD-MCNC: 165 MG/DL (ref 70–99)
GLUCOSE BLD-MCNC: 188 MG/DL (ref 70–99)
GLUCOSE BLD-MCNC: 223 MG/DL (ref 70–99)
IRON: 38 UG/DL (ref 59–158)
PCT TRANSFERRIN: 18 % (ref 10–44)
PHOSPHORUS: 4 MG/DL (ref 2.5–4.9)
POTASSIUM SERPL-SCNC: 4.6 MMOL/L (ref 3.5–5.1)
POTASSIUM SERPL-SCNC: 5.6 MMOL/L (ref 3.5–5.1)
SODIUM BLD-SCNC: 134 MMOL/L (ref 135–145)
SODIUM BLD-SCNC: 134 MMOL/L (ref 135–145)
TOTAL IRON BINDING CAPACITY: 217 UG/DL (ref 250–450)
UNSATURATED IRON BINDING CAPACITY: 179 UG/DL (ref 110–370)

## 2022-10-23 PROCEDURE — 6370000000 HC RX 637 (ALT 250 FOR IP): Performed by: SURGERY

## 2022-10-23 PROCEDURE — 83550 IRON BINDING TEST: CPT

## 2022-10-23 PROCEDURE — 6370000000 HC RX 637 (ALT 250 FOR IP): Performed by: INTERNAL MEDICINE

## 2022-10-23 PROCEDURE — 83540 ASSAY OF IRON: CPT

## 2022-10-23 PROCEDURE — 94761 N-INVAS EAR/PLS OXIMETRY MLT: CPT

## 2022-10-23 PROCEDURE — 2500000003 HC RX 250 WO HCPCS: Performed by: STUDENT IN AN ORGANIZED HEALTH CARE EDUCATION/TRAINING PROGRAM

## 2022-10-23 PROCEDURE — 6360000002 HC RX W HCPCS: Performed by: INTERNAL MEDICINE

## 2022-10-23 PROCEDURE — 82728 ASSAY OF FERRITIN: CPT

## 2022-10-23 PROCEDURE — 2580000003 HC RX 258: Performed by: SURGERY

## 2022-10-23 PROCEDURE — 80048 BASIC METABOLIC PNL TOTAL CA: CPT

## 2022-10-23 PROCEDURE — 94664 DEMO&/EVAL PT USE INHALER: CPT

## 2022-10-23 PROCEDURE — 99232 SBSQ HOSP IP/OBS MODERATE 35: CPT | Performed by: INTERNAL MEDICINE

## 2022-10-23 PROCEDURE — 2140000000 HC CCU INTERMEDIATE R&B

## 2022-10-23 PROCEDURE — 94640 AIRWAY INHALATION TREATMENT: CPT

## 2022-10-23 PROCEDURE — 80069 RENAL FUNCTION PANEL: CPT

## 2022-10-23 PROCEDURE — 6360000002 HC RX W HCPCS: Performed by: SURGERY

## 2022-10-23 PROCEDURE — 2700000000 HC OXYGEN THERAPY PER DAY

## 2022-10-23 PROCEDURE — 82962 GLUCOSE BLOOD TEST: CPT

## 2022-10-23 PROCEDURE — 36415 COLL VENOUS BLD VENIPUNCTURE: CPT

## 2022-10-23 PROCEDURE — APPSS60 APP SPLIT SHARED TIME 46-60 MINUTES: Performed by: NURSE PRACTITIONER

## 2022-10-23 PROCEDURE — 6370000000 HC RX 637 (ALT 250 FOR IP): Performed by: STUDENT IN AN ORGANIZED HEALTH CARE EDUCATION/TRAINING PROGRAM

## 2022-10-23 RX ORDER — ENOXAPARIN SODIUM 100 MG/ML
30 INJECTION SUBCUTANEOUS DAILY
Status: DISCONTINUED | OUTPATIENT
Start: 2022-10-24 | End: 2022-10-24 | Stop reason: HOSPADM

## 2022-10-23 RX ORDER — DEXTROSE MONOHYDRATE 25 G/50ML
25 INJECTION, SOLUTION INTRAVENOUS ONCE
Status: COMPLETED | OUTPATIENT
Start: 2022-10-23 | End: 2022-10-23

## 2022-10-23 RX ADMIN — INSULIN HUMAN 10 UNITS: 100 INJECTION, SOLUTION PARENTERAL at 11:21

## 2022-10-23 RX ADMIN — ISOSORBIDE MONONITRATE 30 MG: 30 TABLET, EXTENDED RELEASE ORAL at 09:18

## 2022-10-23 RX ADMIN — Medication 400 MG: at 22:01

## 2022-10-23 RX ADMIN — TIOTROPIUM BROMIDE INHALATION SPRAY 2 PUFF: 3.12 SPRAY, METERED RESPIRATORY (INHALATION) at 12:42

## 2022-10-23 RX ADMIN — CARVEDILOL 6.25 MG: 6.25 TABLET, FILM COATED ORAL at 09:18

## 2022-10-23 RX ADMIN — SODIUM ZIRCONIUM CYCLOSILICATE 10 G: 10 POWDER, FOR SUSPENSION ORAL at 22:01

## 2022-10-23 RX ADMIN — SODIUM CHLORIDE, PRESERVATIVE FREE 10 ML: 5 INJECTION INTRAVENOUS at 22:02

## 2022-10-23 RX ADMIN — SODIUM CHLORIDE, PRESERVATIVE FREE 10 ML: 5 INJECTION INTRAVENOUS at 09:22

## 2022-10-23 RX ADMIN — COLLAGENASE SANTYL: 250 OINTMENT TOPICAL at 14:15

## 2022-10-23 RX ADMIN — EPOETIN ALFA-EPBX 10000 UNITS: 10000 INJECTION, SOLUTION INTRAVENOUS; SUBCUTANEOUS at 14:15

## 2022-10-23 RX ADMIN — SODIUM ZIRCONIUM CYCLOSILICATE 10 G: 10 POWDER, FOR SUSPENSION ORAL at 14:16

## 2022-10-23 RX ADMIN — ASPIRIN 81 MG CHEWABLE TABLET 81 MG: 81 TABLET CHEWABLE at 09:18

## 2022-10-23 RX ADMIN — OXYCODONE AND ACETAMINOPHEN 1 TABLET: 5; 325 TABLET ORAL at 22:00

## 2022-10-23 RX ADMIN — ATORVASTATIN CALCIUM 40 MG: 40 TABLET, FILM COATED ORAL at 22:00

## 2022-10-23 RX ADMIN — Medication 400 MG: at 09:18

## 2022-10-23 RX ADMIN — OXYCODONE AND ACETAMINOPHEN 1 TABLET: 5; 325 TABLET ORAL at 09:18

## 2022-10-23 RX ADMIN — CARVEDILOL 6.25 MG: 6.25 TABLET, FILM COATED ORAL at 22:00

## 2022-10-23 RX ADMIN — HYDRALAZINE HYDROCHLORIDE 25 MG: 25 TABLET ORAL at 22:01

## 2022-10-23 RX ADMIN — CLOPIDOGREL BISULFATE 75 MG: 75 TABLET ORAL at 09:18

## 2022-10-23 RX ADMIN — DEXTROSE MONOHYDRATE 25 G: 25 INJECTION, SOLUTION INTRAVENOUS at 11:13

## 2022-10-23 RX ADMIN — SODIUM ZIRCONIUM CYCLOSILICATE 10 G: 10 POWDER, FOR SUSPENSION ORAL at 11:11

## 2022-10-23 RX ADMIN — ENOXAPARIN SODIUM 30 MG: 100 INJECTION SUBCUTANEOUS at 09:21

## 2022-10-23 RX ADMIN — SODIUM CHLORIDE, PRESERVATIVE FREE 10 ML: 5 INJECTION INTRAVENOUS at 22:01

## 2022-10-23 ASSESSMENT — PAIN SCALES - GENERAL
PAINLEVEL_OUTOF10: 8
PAINLEVEL_OUTOF10: 8
PAINLEVEL_OUTOF10: 0
PAINLEVEL_OUTOF10: 5
PAINLEVEL_OUTOF10: 2
PAINLEVEL_OUTOF10: 0
PAINLEVEL_OUTOF10: 5

## 2022-10-23 ASSESSMENT — PAIN DESCRIPTION - LOCATION
LOCATION: FOOT;LEG
LOCATION: TOE (COMMENT WHICH ONE)
LOCATION: FOOT

## 2022-10-23 ASSESSMENT — PAIN - FUNCTIONAL ASSESSMENT: PAIN_FUNCTIONAL_ASSESSMENT: ACTIVITIES ARE NOT PREVENTED

## 2022-10-23 ASSESSMENT — PAIN DESCRIPTION - ORIENTATION
ORIENTATION: RIGHT;LEFT

## 2022-10-23 ASSESSMENT — PAIN DESCRIPTION - ONSET: ONSET: ON-GOING

## 2022-10-23 ASSESSMENT — PAIN DESCRIPTION - PAIN TYPE: TYPE: SURGICAL PAIN

## 2022-10-23 ASSESSMENT — PAIN DESCRIPTION - DESCRIPTORS
DESCRIPTORS: THROBBING
DESCRIPTORS: ACHING

## 2022-10-23 ASSESSMENT — PAIN DESCRIPTION - FREQUENCY: FREQUENCY: CONTINUOUS

## 2022-10-23 NOTE — PLAN OF CARE
Problem: Discharge Planning  Goal: Discharge to home or other facility with appropriate resources  Outcome: Progressing  Flowsheets (Taken 10/23/2022 0914)  Discharge to home or other facility with appropriate resources:   Identify barriers to discharge with patient and caregiver   Arrange for needed discharge resources and transportation as appropriate   Identify discharge learning needs (meds, wound care, etc)   Arrange for interpreters to assist at discharge as needed   Refer to discharge planning if patient needs post-hospital services based on physician order or complex needs related to functional status, cognitive ability or social support system     Problem: Pain  Goal: Verbalizes/displays adequate comfort level or baseline comfort level  Outcome: Progressing     Problem: Skin/Tissue Integrity  Goal: Absence of new skin breakdown  Description: 1. Monitor for areas of redness and/or skin breakdown  2. Assess vascular access sites hourly  3. Every 4-6 hours minimum:  Change oxygen saturation probe site  4. Every 4-6 hours:  If on nasal continuous positive airway pressure, respiratory therapy assess nares and determine need for appliance change or resting period.   Outcome: Progressing     Problem: Safety - Adult  Goal: Free from fall injury  Outcome: Progressing     Problem: Chronic Conditions and Co-morbidities  Goal: Patient's chronic conditions and co-morbidity symptoms are monitored and maintained or improved  Outcome: Progressing  Flowsheets (Taken 10/23/2022 0914)  Care Plan - Patient's Chronic Conditions and Co-Morbidity Symptoms are Monitored and Maintained or Improved:   Monitor and assess patient's chronic conditions and comorbid symptoms for stability, deterioration, or improvement   Collaborate with multidisciplinary team to address chronic and comorbid conditions and prevent exacerbation or deterioration   Update acute care plan with appropriate goals if chronic or comorbid symptoms are exacerbated and prevent overall improvement and discharge     Problem: Nutrition Deficit:  Goal: Optimize nutritional status  Outcome: Progressing

## 2022-10-23 NOTE — PROGRESS NOTES
V2.0  [facility] [place]      Name:  Sancho Ma /Age/Sex: 1950  (67 y.o. male)   MRN & CSN:  [id number] & [id number] Encounter Date/Time: 10/23/2022 2:34 PM EDT    Location:  [address] [place] [address]-A PCP: Mindy Aguilar Day: 13    Assessment and Plan:   Sancho Ma is a 67 y.o. male with pmh of  who presents with Heart failure exacerbated by sotalol [facility])      Plan:    Severe PAD  CAD              S/p angiogram and PCI to left SFA (10/18/2022)  S/p right great toe amputation   arterial doppler suggested diminished blood flow to the feet, will proceed with lower extermtiy angiogram  Outpatient right SFA intervention   DAPT/Imdur  Long term AC-Xarelto    Acute hyperkalemia   On lokelma   Insulin D50 ordered   Nephrology is on board    Acute decompensated HFrEF-biventricular failure s/p AICD-pending interrogation per cards   Cardiology following  Strict I&O's - output yesterday. Appears to be down 14 pounds since admission  Hold lasix and zaroxolyn     CKD stage 4 baseline (1.7-2.6)  sCr 3.1-worsening   Nephrology following                Bilateral LE Wounds. POA  General surgery following with Right great toe amputation POD#3  Wound team following     Chronic respiratory failure with hypoxia 2/2 COPD  no exacerbation  3 L NC home oxygen, baseline  Continue bronchodilators/pulmonary hygiene     DMII with chronic complications and with long term use of insulin. Last A1C 7.1 (10/12/22)   Monitor FSBS and cover with medium dose SSI  Hold PO medications while inpatient  Continue gabapentin       Diet ADULT DIET;  Regular; 3 carb choices (45 gm/meal)   DVT Prophylaxis [x] Lovenox, []  Heparin, [] SCDs, [] Ambulation,  [] Eliquis, [] Xarelto  [] Coumadin   Code Status Full Code   Disposition From:   Expected Disposition: home   Estimated Date of Discharge: home  Patient requires continued admission due to acute hyprekalemia   Surrogate Decision Maker/ POA      Subjective:     Chief Complaint: Leg Swelling (B/L)     Patient seen at bedside, patient denies shortness of breath, he is lying flat, patient denies any pain at the site of the surgery, dressing changed 10/22/22      Review of Systems:    Review of Systems    Review of systems is negative except as mentioned above       Objective: Intake/Output Summary (Last 24 hours) at 10/23/2022 0931  Last data filed at 10/23/2022 2697  Gross per 24 hour   Intake 10 ml   Output 750 ml   Net -740 ml          Vitals:   Vitals:    10/23/22 0918   BP: (!) 147/89   Pulse: 60   Resp: 15   Temp:    SpO2:        Physical Exam:     General: NAD  Eyes: EOMI  ENT: neck supple  Cardiovascular: Regular rate. Respiratory: Clear to auscultation  Gastrointestinal: Soft, non tender  Genitourinary: no suprapubic tenderness  Musculoskeletal: No edema  Skin: Bilateral lower extremity foot wounds dressings CDI. Chronic skin changes consistent with venous stasis dermatitis. Postoperative dressing in place  Neuro: Alert. Psych: Mood appropriate.      Medications:   Medications:    sodium chloride flush  5-40 mL IntraVENous 2 times per day    aspirin  81 mg Oral Daily    clopidogrel  75 mg Oral Daily    [Held by provider] furosemide  80 mg IntraVENous TID    [Held by provider] metOLazone  5 mg Oral Daily    magnesium oxide  400 mg Oral BID    carvedilol  6.25 mg Oral BID    hydrALAZINE  25 mg Oral 3 times per day    isosorbide mononitrate  30 mg Oral Daily    lidocaine PF  5 mL IntraDERmal Once    sodium chloride flush  5-40 mL IntraVENous 2 times per day    atorvastatin  40 mg Oral Nightly    [Held by provider] metaxalone  800 mg Oral TID    tiotropium  2 puff Inhalation Daily    sodium chloride flush  5-40 mL IntraVENous 2 times per day    enoxaparin  30 mg SubCUTAneous BID    insulin lispro  0-4 Units SubCUTAneous TID WC    insulin lispro  0-4 Units SubCUTAneous Nightly    collagenase   Topical Daily      Infusions:    sodium chloride      MM    Total Immature Neutrophil 0.01 K/CU MM    Immature Neutrophil % 0.2 0 - 0.43 %   POCT Glucose    Collection Time: 10/22/22 12:15 PM   Result Value Ref Range    POC Glucose 194 (H) 70 - 99 MG/DL   POCT Glucose    Collection Time: 10/22/22  5:07 PM   Result Value Ref Range    POC Glucose 248 (H) 70 - 99 MG/DL   POCT Glucose    Collection Time: 10/22/22  8:38 PM   Result Value Ref Range    POC Glucose 165 (H) 70 - 99 MG/DL   Renal Function Panel    Collection Time: 10/23/22  7:32 AM   Result Value Ref Range    Sodium 134 (L) 135 - 145 MMOL/L    Potassium 5.6 (HH) 3.5 - 5.1 MMOL/L    Chloride 96 (L) 99 - 110 mMol/L    CO2 26 21 - 32 MMOL/L    Anion Gap 12 4 - 16    BUN 72 (H) 6 - 23 MG/DL    Creatinine 3.8 (H) 0.9 - 1.3 MG/DL    Est, Glom Filt Rate 16 (L) >60 mL/min/1.73m2    Glucose 153 (H) 70 - 99 MG/DL    Calcium 7.7 (L) 8.3 - 10.6 MG/DL    Albumin 2.8 (L) 3.4 - 5.0 GM/DL    Phosphorus 4.0 2.5 - 4.9 MG/DL   POCT Glucose    Collection Time: 10/23/22  9:17 AM   Result Value Ref Range    POC Glucose 162 (H) 70 - 99 MG/DL        Imaging/Diagnostics Last 24 Hours   No results found.     Electronically signed by Qian Holloway MD on 10/23/2022 at 9:31 AM

## 2022-10-23 NOTE — PROGRESS NOTES
Nephrology Progress Note  10/23/2022 9:49 AM  Subjective: Interval History: Joseph Eller is a 67 y.o. male appears doing well in general no distress appears more calm potassium elevated today      Data:   Scheduled Meds:   insulin regular  10 Units IntraVENous Once    dextrose  25 g IntraVENous Once    sodium chloride flush  5-40 mL IntraVENous 2 times per day    aspirin  81 mg Oral Daily    clopidogrel  75 mg Oral Daily    [Held by provider] furosemide  80 mg IntraVENous TID    [Held by provider] metOLazone  5 mg Oral Daily    magnesium oxide  400 mg Oral BID    carvedilol  6.25 mg Oral BID    hydrALAZINE  25 mg Oral 3 times per day    isosorbide mononitrate  30 mg Oral Daily    lidocaine PF  5 mL IntraDERmal Once    sodium chloride flush  5-40 mL IntraVENous 2 times per day    atorvastatin  40 mg Oral Nightly    [Held by provider] metaxalone  800 mg Oral TID    tiotropium  2 puff Inhalation Daily    sodium chloride flush  5-40 mL IntraVENous 2 times per day    enoxaparin  30 mg SubCUTAneous BID    insulin lispro  0-4 Units SubCUTAneous TID WC    insulin lispro  0-4 Units SubCUTAneous Nightly    collagenase   Topical Daily     Continuous Infusions:   sodium chloride      sodium chloride      sodium chloride      dextrose           CBC   Recent Labs     10/20/22  1020 10/21/22  0742 10/22/22  1214   WBC 7.0 7.9 6.1   HGB 8.7* 10.0* 8.5*   HCT 29.2* 33.9* 28.5*    275 232      BMP   Recent Labs     10/22/22  0810 10/22/22  1214 10/23/22  0732    135 134*   K 4.9 5.2* 5.6*   CL 96* 96* 96*   CO2 32 31 26   PHOS  --  4.2 4.0   BUN 72* 70* 72*   CREATININE 3.6* 3.7* 3.8*     Hepatic:   Recent Labs     10/20/22  1020 10/21/22  0742 10/22/22  0810   AST 11* 22 12*   ALT <5* 11 8*   BILITOT 0.8 0.9 0.7   ALKPHOS 70 76 66     Troponin: No results for input(s): TROPONINI in the last 72 hours. BNP: No results for input(s): BNP in the last 72 hours.   Lipids: No results for input(s): CHOL, HDL in the last 72 hours.    Invalid input(s): LDLCALCU  ABGs: No results found for: PHART, PO2ART, HIK8QGZ  INR: No results for input(s): INR in the last 72 hours. Renal Labs  Albumin:    Lab Results   Component Value Date/Time    LABALBU 2.8 10/23/2022 07:32 AM     Calcium:    Lab Results   Component Value Date/Time    CALCIUM 7.7 10/23/2022 07:32 AM     Phosphorus:    Lab Results   Component Value Date/Time    PHOS 4.0 10/23/2022 07:32 AM     U/A:    Lab Results   Component Value Date/Time    NITRU NEGATIVE 10/13/2022 05:15 PM    COLORU YELLOW 10/13/2022 05:15 PM    PHUR 5.0 08/19/2016 09:38 AM    WBCUA <1 10/13/2022 05:15 PM    RBCUA NONE SEEN 10/13/2022 05:15 PM    MUCUS RARE 01/23/2022 02:25 PM    TRICHOMONAS NONE SEEN 10/13/2022 05:15 PM    BACTERIA NEGATIVE 10/13/2022 05:15 PM    CLARITYU CLEAR 10/13/2022 05:15 PM    SPECGRAV 1.015 10/13/2022 05:15 PM    UROBILINOGEN 0.2 10/13/2022 05:15 PM    BILIRUBINUR NEGATIVE 10/13/2022 05:15 PM    BLOODU NEGATIVE 10/13/2022 05:15 PM    KETUA NEGATIVE 10/13/2022 05:15 PM     ABG:  No results found for: PHART, SDO0EET, PO2ART, DHX6YLF, BEART, THGBART, XKF4PKK, V4PGGIRD  HgBA1c:    Lab Results   Component Value Date/Time    LABA1C 7.1 10/12/2022 06:28 AM     Microalbumen/Creatinine ratio:  No components found for: RUCREAT  TSH:  No results found for: TSH  IRON:    Lab Results   Component Value Date/Time    IRON 30 01/27/2022 03:37 AM     Iron Saturation:  No components found for: PERCENTFE  TIBC:    Lab Results   Component Value Date/Time    TIBC 182 01/27/2022 03:37 AM     FERRITIN:    Lab Results   Component Value Date/Time    FERRITIN 102 06/08/2021 08:30 AM     RPR:  No results found for: RPR  TIGIST:  No results found for: ANATITER, TIGIST  24 Hour Urine for Creatinine Clearance:  No components found for: CREAT4, UHRS10, UTV10      Objective:   I/O: 10/22 0701 - 10/23 0700  In: 5 [I.V.:5]  Out: 750 [Urine:750]  I/O last 3 completed shifts:   In: 5 [I.V.:5]  Out: 2958 [XNTKE:3343]  I/O this shift: In: 5 [I.V.:5]  Out: -   Vitals: BP (!) 147/89   Pulse 60   Temp 97.8 °F (36.6 °C) (Oral)   Resp 15   Ht 6' 1\" (1.854 m)   Wt 261 lb (118.4 kg)   SpO2 99%   BMI 34.43 kg/m²  {  General appearance: awake weak  HEENT: Head: Normal, normocephalic, atraumatic.   Neck: supple, symmetrical, trachea midline  Lungs: diminished breath sounds bilaterally  Heart: S1, S2 normal  Abdomen: abnormal findings:  soft nt  Extremities: edema trace status post surgery wrap legs  Neurologic: Mental status: alertness: alert        Assessment and Plan:      IMP:  #1 acute renal failure from risk of ATN and contrast on CKD 4  2 congestive heart failure with atherosclerotic cardiovascular disease  #3 soft tissue infection with amputation first toe osteomyelitis  #4 anemia  #5 hypotension  #6 hyperkalemia    Plan     #1 creatinine 3.8 from 3.7 holding about the same not appear uremic no acute dialysis needs yet hold diuretics  #2 cardiac status monitor volume controlled but 1 L urine output  #3 status post surgery monitor for any source of infection allow wound healing  #4 hemoglobin 8.5 after surgery will monitor give 1 dose epo follow-up iron and ferritin level  #5 blood pressure overall stable somewhat increased  #6 low potassium diet start Lokelma  We will follow supportive care           Naseem Day MD, MD

## 2022-10-23 NOTE — PROGRESS NOTES
Cardiology Progress Note     Today's Plan: will sign off     Admit Date:  10/11/2022    Consult reason/ Seen today for: CHF    Subjective and  Overnight Events: Patient denies any shortness of breath or chest pain. Tenderness to lower extremities noted. Patient has had limited amount of mobility due to recent surgery. History of Presenting Illness:    Chief complain on admission : 67 y. o.year old who is admitted for  Chief Complaint   Patient presents with    Leg Swelling     B/L        Past medical history:    has a past medical history of Acid reflux, Acute MI ([address] Arthritis, Broken teeth, CAD (coronary artery disease), Cardiomyopathy ([address] CHF (congestive heart failure) ([address] Chronic back pain, Chronic kidney disease, Diabetes mellitus ([address] Diabetic neuropathy ([address] H/O cardiovascular stress test, H/O Doppler ultrasound, H/O percutaneous left heart catheterization, History of irregular heartbeat, History of syncope, Hyperlipidemia, Hypertension, Leg swelling, Necrotic toes (HCC), Neuropathy, neuropathy, PAD (peripheral artery disease) ([address] PVD (peripheral vascular disease) ([address] Sick sinus syndrome ([address] Sleep apnea, Spinal stenosis, Teeth missing, Type 2 diabetes mellitus without complication ([address] and WD-Chronic foot ulcer, left, with necrosis of bone ([address] Past surgical history:   has a past surgical history that includes Coronary angioplasty with stent; Dental surgery; Colonoscopy (08/04/2016); pacemaker placement (06/04/2010); vascular surgery; colectomy (Right, 08/26/2016); Toe amputation (Right, 09/12/2017); Toe amputation (Right, 01/09/2018); Cardiac catheterization; Cardiac defibrillator placement (06/04/2010); Coronary angioplasty; Cardiac catheterization (07/14/2017); Cardiac catheterization (11/20/2018); Toe amputation (Left, 12/26/2020); IR TUNNELED CVC PLACE WO SQ PORT/PUMP > 5 YEARS (6/14/2021);  Toe amputation (Left, 7/26/2022); and Toe amputation (Right, 10/20/2022). Social History:   reports that he quit smoking about a year ago. His smoking use included cigars and cigarettes. He started smoking about 42 years ago. He has a 9.00 pack-year smoking history. He has never used smokeless tobacco. He reports current drug use. Drug: Marijuana [facility] [place]). He reports that he does not drink alcohol. Family history:  family history includes Cancer in his brother, father, and son; Diabetes in his brother, mother, and sister; Early Death (age of onset: 62) in his sister; Heart Disease in his brother and sister; High Blood Pressure in his brother, brother, and mother; Neuropathy in his sister; Other in his sister; Stroke in his mother; Vision Loss in his mother. Allergies   Allergen Reactions    Pcn [Penicillins] Hives    Fentanyl Itching       Review of Systems:  Review of Systems   Cardiovascular:  Negative for chest pain, palpitations and leg swelling. Musculoskeletal: Negative. Neurological:  Negative for dizziness and weakness. All other systems reviewed and are negative. BP (!) 147/89   Pulse 60   Temp 97.8 °F (36.6 °C) (Oral)   Resp 15   Ht 6' 1\" (1.854 m)   Wt 261 lb (118.4 kg)   SpO2 99%   BMI 34.43 kg/m²     Intake/Output Summary (Last 24 hours) at 10/23/2022 1204  Last data filed at 10/23/2022 1455  Gross per 24 hour   Intake 5 ml   Output 750 ml   Net -745 ml         Physical Exam:  Physical Exam  Constitutional:       Appearance: He is well-developed. Cardiovascular:      Rate and Rhythm: Normal rate and regular rhythm. Pulses: Intact distal pulses. Decreased pulses. Heart sounds: Normal heart sounds, S1 normal and S2 normal.   Pulmonary:      Effort: Pulmonary effort is normal.      Breath sounds: Normal breath sounds. Musculoskeletal:         General: Normal range of motion. Skin:     General: Skin is warm and dry. Neurological:      Mental Status: He is alert and oriented to person, place, and time. Medications:    epoetin grayson-epbx  10,000 Units SubCUTAneous Once    sodium zirconium cyclosilicate  10 g Oral TID    sodium chloride flush  5-40 mL IntraVENous 2 times per day    aspirin  81 mg Oral Daily    clopidogrel  75 mg Oral Daily    magnesium oxide  400 mg Oral BID    carvedilol  6.25 mg Oral BID    hydrALAZINE  25 mg Oral 3 times per day    isosorbide mononitrate  30 mg Oral Daily    lidocaine PF  5 mL IntraDERmal Once    sodium chloride flush  5-40 mL IntraVENous 2 times per day    atorvastatin  40 mg Oral Nightly    [Held by provider] metaxalone  800 mg Oral TID    tiotropium  2 puff Inhalation Daily    sodium chloride flush  5-40 mL IntraVENous 2 times per day    enoxaparin  30 mg SubCUTAneous BID    insulin lispro  0-4 Units SubCUTAneous TID WC    insulin lispro  0-4 Units SubCUTAneous Nightly    collagenase   Topical Daily      sodium chloride      sodium chloride      sodium chloride      dextrose       sodium chloride flush, sodium chloride, acetaminophen, sodium chloride flush, sodium chloride, albuterol sulfate HFA, oxyCODONE-acetaminophen, sodium chloride flush, sodium chloride, ondansetron **OR** ondansetron, glucose, dextrose bolus **OR** dextrose bolus, glucagon (rDNA), dextrose    Lab Data:  CBC:   Recent Labs     10/21/22  0742 10/22/22  1214   WBC 7.9 6.1   HGB 10.0* 8.5*   HCT 33.9* 28.5*   MCV 92.4 90.2    232       BMP:   Recent Labs     10/22/22  0810 10/22/22  1214 10/23/22  0732    135 134*   K 4.9 5.2* 5.6*   CL 96* 96* 96*   CO2 32 31 26   PHOS  --  4.2 4.0   BUN 72* 70* 72*   CREATININE 3.6* 3.7* 3.8*       PT/INR: No results for input(s): PROTIME, INR in the last 72 hours. BNP:  No results for input(s): PROBNP in the last 72 hours. TROPONIN: No results for input(s): TROPONINT in the last 72 hours.      Impression:  Principal Problem:    Heart failure exacerbated by sotalol [facility])  Active Problems:    Precordial pain    Diabetic ulcer of right foot due to type 2 diabetes mellitus (HCC)    CHF (congestive heart failure), NYHA class I, acute on chronic, combined (HCC)    Acute on chronic diastolic CHF (congestive heart failure) ([facility])    Leg swelling    PAD (peripheral artery disease) ([facility])    Biventricular ICD (implantable cardioverter-defibrillator) in place  Resolved Problems:    * No resolved hospital problems. *        Assessment / Plan / Recommendation:     CHF: right sided failure; s/p milrinone and Lasix drip. Patient appears euvolemic at this time. Nephrology managing diuretics. May with hydralazine, Imdur, Coreg. No MRA/ACE/ARB/ARNi due to CKD. PAD: S/p angiogram and PCI of left SFA 10/18/2022. S/p right great toe amputation 10/20/22. Outpatient right SFA intervention. CKD: nephrology managing. Osteomyelitis great right toe, s/p amputation  Will sign off, please re-consult for any new cardiac complaints or concerns. All labs, medications and tests reviewed by myself, continue all other medications of all above medical condition listed as is except for changes mentioned above. Thank you   Please call with questions. Electronically signed by David Ratliff. MICKY Lancaster CNP on 10/23/2022 at 12:04 PM     CARDIOLOGY ATTENDING ADDENDUM    HPI:  I have reviewed the HPI  And agree with above   Rosita Lam is a 67 y. o.year with significant heart failure as above as well as osteomyelitis. Chief Complaint   Patient presents with    Leg Swelling     B/L     Please review addendum/changes made to note above   Interval history: Overnight he has been stable. Currently he is sleeping comfortably    Physical Exam:  General:  Awake, alert, NAD  Head:normal  Eye:normal  Neck:  No JVD   Chest:  Clear to auscultation, respiration easy  Cardiovascular: Regular rhythm  Pulses; palpable  Neuro: grossly normal    Plan: Continue with his current current cardiac regimen of carvedilol, Imdur. SFA intervention will be planned at a later date as an outpatient.     I agree with the plan, which was planned by myself and discussed with advanced level provider. My documented MDM is a substantive portion of the supervisory note. I have seen ,spoken to  and examined this patient personally, independently of the advanced level provider. I have spent substantiate  portion of this encounter independently myself in examining patient and developing the medical management plan . I have reviewed the hospital care given to date and reviewed all pertinent labs and imaging. The plan was developed mutually at the time of the visit with the patient,  NP /PA  and myself. I have spoken with patient, nursing staff and provided written and verbal instructions . The above note has been reviewed and I agree with the assessment, diagnosis, and treatment plan with changes made by me as follows .         Josh Leong MD [facility] 10/23/22

## 2022-10-23 NOTE — CONSULTS
RENAL DOSE ADJUSTMENT MADE PER P/T PROTOCOL    PREVIOUS ORDER:  Enoxaparin 30mg subq bid    Estimated Creatinine Clearance: 24 mL/min (A) (based on SCr of 3.8 mg/dL (H)). Recent Labs     10/22/22  0810 10/22/22  1214 10/23/22  0732   BUN 72* 70* 72*   CREATININE 3.6* 3.7* 3.8*   PLT  --  232  --      NEW RENALLY ADJUSTED ORDER:  ENOXAPARIN 30MG SUBQ DAILY    Tete Crowder, [facility]  10/23/2022 12:19 PM

## 2022-10-24 VITALS
OXYGEN SATURATION: 98 % | HEIGHT: 73 IN | WEIGHT: 255 LBS | TEMPERATURE: 98.2 F | RESPIRATION RATE: 11 BRPM | HEART RATE: 60 BPM | BODY MASS INDEX: 33.8 KG/M2 | DIASTOLIC BLOOD PRESSURE: 94 MMHG | SYSTOLIC BLOOD PRESSURE: 165 MMHG

## 2022-10-24 LAB
ALBUMIN SERPL-MCNC: 2.8 GM/DL (ref 3.4–5)
ANION GAP SERPL CALCULATED.3IONS-SCNC: 7 MMOL/L (ref 4–16)
BUN BLDV-MCNC: 71 MG/DL (ref 6–23)
CALCIUM SERPL-MCNC: 7.5 MG/DL (ref 8.3–10.6)
CHLORIDE BLD-SCNC: 96 MMOL/L (ref 99–110)
CO2: 33 MMOL/L (ref 21–32)
CREAT SERPL-MCNC: 3.8 MG/DL (ref 0.9–1.3)
GFR SERPL CREATININE-BSD FRML MDRD: 16 ML/MIN/1.73M2
GLUCOSE BLD-MCNC: 217 MG/DL (ref 70–99)
GLUCOSE BLD-MCNC: 244 MG/DL (ref 70–99)
GLUCOSE BLD-MCNC: 262 MG/DL (ref 70–99)
PHOSPHORUS: 3.9 MG/DL (ref 2.5–4.9)
POTASSIUM SERPL-SCNC: 4.8 MMOL/L (ref 3.5–5.1)
SODIUM BLD-SCNC: 136 MMOL/L (ref 135–145)

## 2022-10-24 PROCEDURE — 6370000000 HC RX 637 (ALT 250 FOR IP): Performed by: SURGERY

## 2022-10-24 PROCEDURE — 82962 GLUCOSE BLOOD TEST: CPT

## 2022-10-24 PROCEDURE — 2580000003 HC RX 258: Performed by: SURGERY

## 2022-10-24 PROCEDURE — 94761 N-INVAS EAR/PLS OXIMETRY MLT: CPT

## 2022-10-24 PROCEDURE — 94664 DEMO&/EVAL PT USE INHALER: CPT

## 2022-10-24 PROCEDURE — 2700000000 HC OXYGEN THERAPY PER DAY

## 2022-10-24 PROCEDURE — 36415 COLL VENOUS BLD VENIPUNCTURE: CPT

## 2022-10-24 PROCEDURE — 94640 AIRWAY INHALATION TREATMENT: CPT

## 2022-10-24 PROCEDURE — 6360000002 HC RX W HCPCS: Performed by: SURGERY

## 2022-10-24 PROCEDURE — 80069 RENAL FUNCTION PANEL: CPT

## 2022-10-24 RX ORDER — ASPIRIN 81 MG/1
81 TABLET, CHEWABLE ORAL DAILY
Qty: 30 TABLET | Refills: 3 | Status: SHIPPED | OUTPATIENT
Start: 2022-10-25

## 2022-10-24 RX ORDER — ISOSORBIDE MONONITRATE 30 MG/1
30 TABLET, EXTENDED RELEASE ORAL DAILY
Qty: 30 TABLET | Refills: 3 | Status: SHIPPED | OUTPATIENT
Start: 2022-10-25

## 2022-10-24 RX ORDER — HYDRALAZINE HYDROCHLORIDE 25 MG/1
25 TABLET, FILM COATED ORAL EVERY 8 HOURS SCHEDULED
Qty: 90 TABLET | Refills: 3 | Status: ON HOLD | OUTPATIENT
Start: 2022-10-24 | End: 2022-11-18 | Stop reason: HOSPADM

## 2022-10-24 RX ORDER — LANOLIN ALCOHOL/MO/W.PET/CERES
400 CREAM (GRAM) TOPICAL 2 TIMES DAILY
Qty: 30 TABLET | Refills: 0 | Status: SHIPPED | OUTPATIENT
Start: 2022-10-24

## 2022-10-24 RX ADMIN — OXYCODONE AND ACETAMINOPHEN 1 TABLET: 5; 325 TABLET ORAL at 12:46

## 2022-10-24 RX ADMIN — COLLAGENASE SANTYL: 250 OINTMENT TOPICAL at 12:28

## 2022-10-24 RX ADMIN — INSULIN LISPRO 2 UNITS: 100 INJECTION, SOLUTION INTRAVENOUS; SUBCUTANEOUS at 09:07

## 2022-10-24 RX ADMIN — INSULIN LISPRO 1 UNITS: 100 INJECTION, SOLUTION INTRAVENOUS; SUBCUTANEOUS at 12:04

## 2022-10-24 RX ADMIN — Medication 400 MG: at 09:09

## 2022-10-24 RX ADMIN — HYDRALAZINE HYDROCHLORIDE 25 MG: 25 TABLET ORAL at 05:34

## 2022-10-24 RX ADMIN — TIOTROPIUM BROMIDE INHALATION SPRAY 2 PUFF: 3.12 SPRAY, METERED RESPIRATORY (INHALATION) at 13:02

## 2022-10-24 RX ADMIN — HYDRALAZINE HYDROCHLORIDE 25 MG: 25 TABLET ORAL at 14:51

## 2022-10-24 RX ADMIN — SODIUM CHLORIDE, PRESERVATIVE FREE 10 ML: 5 INJECTION INTRAVENOUS at 09:11

## 2022-10-24 RX ADMIN — CARVEDILOL 6.25 MG: 6.25 TABLET, FILM COATED ORAL at 09:09

## 2022-10-24 RX ADMIN — ISOSORBIDE MONONITRATE 30 MG: 30 TABLET, EXTENDED RELEASE ORAL at 09:10

## 2022-10-24 RX ADMIN — CLOPIDOGREL BISULFATE 75 MG: 75 TABLET ORAL at 09:10

## 2022-10-24 RX ADMIN — ASPIRIN 81 MG CHEWABLE TABLET 81 MG: 81 TABLET CHEWABLE at 09:09

## 2022-10-24 RX ADMIN — ENOXAPARIN SODIUM 30 MG: 100 INJECTION SUBCUTANEOUS at 09:10

## 2022-10-24 ASSESSMENT — PAIN SCALES - WONG BAKER
WONGBAKER_NUMERICALRESPONSE: 0

## 2022-10-24 ASSESSMENT — PAIN DESCRIPTION - DESCRIPTORS: DESCRIPTORS: ACHING

## 2022-10-24 ASSESSMENT — PAIN SCALES - GENERAL
PAINLEVEL_OUTOF10: 0
PAINLEVEL_OUTOF10: 8
PAINLEVEL_OUTOF10: 0

## 2022-10-24 ASSESSMENT — PAIN DESCRIPTION - ORIENTATION: ORIENTATION: RIGHT

## 2022-10-24 ASSESSMENT — PAIN DESCRIPTION - LOCATION: LOCATION: FOOT

## 2022-10-24 NOTE — CARE COORDINATION
[facility] Liaison spoke with pt and is aware of discharge & will initiate [address]. Spoke with daughter Jaime Darden who will complete his dressings changes.  Also spoke with pts nurse and asked her to give him extra supplies to take home until c can get supply company set up/

## 2022-10-24 NOTE — PROGRESS NOTES
Nephrology Progress Note  10/24/2022 7:48 AM        Subjective:   Admit Date: 10/11/2022  PCP: Fredi Mitchell    Interval History: Weekend events reviewed  He wants to go home    Diet: Reasonable    ROS: No shortness of breath or confusion  Urine output recorded 1.65 L for the last 24 hours  No fever and acceptable blood pressure    Data:     Current meds:    enoxaparin  30 mg SubCUTAneous Daily    sodium chloride flush  5-40 mL IntraVENous 2 times per day    aspirin  81 mg Oral Daily    clopidogrel  75 mg Oral Daily    magnesium oxide  400 mg Oral BID    carvedilol  6.25 mg Oral BID    hydrALAZINE  25 mg Oral 3 times per day    isosorbide mononitrate  30 mg Oral Daily    lidocaine PF  5 mL IntraDERmal Once    sodium chloride flush  5-40 mL IntraVENous 2 times per day    atorvastatin  40 mg Oral Nightly    [Held by provider] metaxalone  800 mg Oral TID    tiotropium  2 puff Inhalation Daily    sodium chloride flush  5-40 mL IntraVENous 2 times per day    insulin lispro  0-4 Units SubCUTAneous TID WC    insulin lispro  0-4 Units SubCUTAneous Nightly    collagenase   Topical Daily      sodium chloride      sodium chloride      sodium chloride      dextrose           I/O last 3 completed shifts:   In: 5 [I.V.:5]  Out: 2400 [Urine:2400]    CBC:   Recent Labs     10/22/22  1214   WBC 6.1   HGB 8.5*             Recent Labs     10/23/22  0732 10/23/22  1753 10/24/22  0046   * 134* 136   K 5.6* 4.6 4.8   CL 96* 95* 96*   CO2 26 32 33*   BUN 72* 72* 71*   CREATININE 3.8* 3.6* 3.8*   GLUCOSE 153* 188* 217*       Lab Results   Component Value Date    CALCIUM 7.5 (L) 10/24/2022    PHOS 3.9 10/24/2022       Objective:     Vitals: BP (!) 149/84   Pulse 63   Temp 98 °F (36.7 °C) (Oral)   Resp 16   Ht 6' 1\" (1.854 m)   Wt 255 lb (115.7 kg)   SpO2 100%   BMI 33.64 kg/m² ,    General appearance: Alert, awake and oriented  HEENT: At least 2+ conjunctival pallor  Neck: Supple  Lungs: No gross crackles some adventitious breath sound  Heart: Regular rate and rhythm, left chest wall implanted cardiac device  Abdomen: Soft nontender  Extremities: Left thigh edema and stasis dermatitis      Problem List :         Impression :     Acute kidney injury with underlying CKD stage G4 A1-several insults to his kidney including cardiorenal syndrome, peripheral angiogram, several medication, all of them can affect kidney function  Acute decompensated heart failure with underlying atherosclerotic cardiovascular disease-good urine output without diuretics  Skin/soft tissue/bone infection status palpitation and multifactoral anemia  Underlying atherosclerotic cardiovascular disease/hypertension and other chronic disease    Recommendation/Plan  :     Discontinue all diuretics  Okay to discharge from my standpoint  CMP, CBC differential, magnesium and phosphorus in 1 week  Follow-up with me in 2 weeks  He will call me with decreased urine output, increased weight gain more than 2 pounds, increased shortness of breath or edema  Low-salt, DASH diet      Kuldeep Anderson MD MD

## 2022-10-24 NOTE — DISCHARGE SUMMARY
V2.0  Discharge Summary    Name:  González Moe /Age/Sex: 1950 (67 y.o. male)   Admit Date: 10/11/2022  Discharge Date: 10/24/22    MRN & CSN:  [id number] & [id number] Encounter Date and Time 10/24/22 10:17 AM EDT    Attending:  Sha Ramos MD Discharging Provider: Sha Ramos MD       Hospital Course:     Brief HPI: González Moe is a 67 y.o. male who presented with severe peripheral artery disease s/p angiogram and PCI to left SFA 10/18 and needs angio of the right SFA with intervention but taken to the OR for right great toe amputation. Brief Problem Based Course:     Severe PAD  CAD  S/p angiogram and PCI to left SFA (10/18/2022)  S/p right great toe amputation   arterial doppler suggested diminished blood flow to the feet, will proceed with lower extermtiy angiogram  Outpatient right SFA intervention   DAPT/Imdur  Long term AC-Xarelto     Acute hyperkalemia-resolved, held zaroxolyn   On lokelma   Insulin D50 ordered   Nephrology is on board     Acute decompensated HFrEF-biventricular failure s/p AICD-pending interrogation per cards   Cardiology following, had been on IV milrinone and lasix  Appears to be down 14 pounds since admission  Hold lasix and zaroxolyn due to hyperkalemia   Toresimide was resumed upon discharge      CKD stage 4 baseline (1.7-2.6)  sCr 3.1-worsening   Nephrology following                Bilateral LE Wounds. POA  General surgery following with Right great toe amputation POD#4  Wound team following-home health care consulted      Chronic respiratory failure with hypoxia 2/2 COPD  no exacerbation  3 L NC home oxygen, baseline  Continue bronchodilators/pulmonary hygiene     DMII with chronic complications and with long term use of insulin.  Last A1C 7.1 (10/12/22)   Monitor FSBS and cover with medium dose SSI  Hold PO medications while inpatient  Continue gabapentin      The patient expressed appropriate understanding of, and agreement with the discharge recommendations, medications, and plan. Consults this admission:  IP CONSULT TO HOSPITALIST  IP CONSULT TO GENERAL SURGERY  IP CONSULT TO CARDIOLOGY  IP CONSULT TO NEPHROLOGY  IP CONSULT TO IV TEAM  IP CONSULT TO HOME CARE NEEDS    Discharge Diagnosis:   Heart failure exacerbated by sotalol [facility])    Discharge Instruction:   Follow up appointments:   Primary care physician: Howie Valerio within 2 weeks  Diet: cardiac diet   Activity: activity as tolerated  Disposition: Discharged to:   [x]Home, []HHC, []SNF, []Acute Rehab, []Hospice   Condition on discharge: Stable  Labs and Tests to be Followed up as an outpatient by PCP or Specialist:     Discharge Medications:        Medication List        START taking these medications      aspirin 81 MG chewable tablet  Take 1 tablet by mouth daily  Start taking on: October 25, 2022     collagenase 250 UNIT/GM ointment  Apply topically daily. hydrALAZINE 25 MG tablet  Commonly known as: APRESOLINE  Take 1 tablet by mouth every 8 hours     isosorbide mononitrate 30 MG extended release tablet  Commonly known as: IMDUR  Take 1 tablet by mouth daily  Start taking on: October 25, 2022     magnesium oxide 400 (240 Mg) MG tablet  Commonly known as: MAG-OX  Take 1 tablet by mouth 2 times daily            CONTINUE taking these medications      albuterol sulfate  (90 Base) MCG/ACT inhaler  Commonly known as: Ventolin HFA  Inhale 2 puffs into the lungs every 4 hours as needed for Wheezing     atorvastatin 40 MG tablet  Commonly known as: LIPITOR  Take 1 tablet by mouth nightly     carvedilol 3.125 MG tablet  Commonly known as: COREG  Take 1 tablet by mouth 2 times daily     clopidogrel 75 MG tablet  Commonly known as: PLAVIX  Take 1 tablet by mouth daily     gabapentin 300 MG capsule  Commonly known as: NEURONTIN  TAKE 1 CAPSULE BY MOUTH 3 TIMES DAILY FOR 90 DAYS.      oxyCODONE-acetaminophen 5-325 MG per tablet  Commonly known as: PERCOCET     Spiriva HandiHaler 18 MCG inhalation capsule  Generic drug: tiotropium     torsemide 20 MG tablet  Commonly known as: DEMADEX  Take 2 tablets by mouth daily            STOP taking these medications      Algicell Calcium Dressing 4\"x8 Misc     amLODIPine 10 MG tablet  Commonly known as: NORVASC     diclofenac sodium 1 % Gel  Commonly known as: VOLTAREN     insulin lispro (1 Unit Dial) 100 UNIT/ML Sopn  Commonly known as: HUMALOG/ADMELOG     lisinopril 5 MG tablet  Commonly known as: PRINIVIL;ZESTRIL     metaxalone 800 MG tablet  Commonly known as: SKELAXIN     metOLazone 2.5 MG tablet  Commonly known as: ZAROXOLYN     PROMOGRAN COREY WOUND DRESSING MATRIX               Where to Get Your Medications        These medications were sent to [address] [place] [address], [address] [facility]       Phone: [phone]   aspirin 81 MG chewable tablet  collagenase 250 UNIT/GM ointment  hydrALAZINE 25 MG tablet  isosorbide mononitrate 30 MG extended release tablet  magnesium oxide 400 (240 Mg) MG tablet        Objective Findings at Discharge:   BP (!) 142/53   Pulse 60   Temp 97.4 °F (36.3 °C) (Axillary)   Resp 18   Ht 6' 1\" (1.854 m)   Wt 255 lb (115.7 kg)   SpO2 100%   BMI 33.64 kg/m²       Physical Exam:   General: NAD  Eyes: EOMI  ENT: neck supple  Cardiovascular: Regular rate. Respiratory: Clear to auscultation  Gastrointestinal: Soft, non tender  Genitourinary: no suprapubic tenderness  Musculoskeletal: No edema  Skin: Bilateral lower extremity foot wounds dressings CDI. Chronic skin changes consistent with venous stasis dermatitis. Postoperative dressing in place  Neuro: Alert. Psych: Mood appropriate. Labs and Imaging   No results found.     CBC:   Recent Labs     10/22/22  1214   WBC 6.1   HGB 8.5*        BMP:    Recent Labs     10/23/22  0732 10/23/22  1753 10/24/22  0046   * 134* 136   K 5.6* 4.6 4.8   CL 96* 95* 96*   CO2 26 32 33*   BUN 72* 72* 71*   CREATININE 3.8* 3.6* 3.8*   GLUCOSE 153* 188* 217*     Hepatic:   Recent Labs     10/22/22  0810   AST 12*   ALT 8*   BILITOT 0.7   ALKPHOS 66     Lipids:   Lab Results   Component Value Date/Time    CHOL 79 12/11/2020 07:00 AM    HDL 30 12/11/2020 07:00 AM    TRIG 61 12/11/2020 07:00 AM     Hemoglobin A1C:   Lab Results   Component Value Date/Time    LABA1C 7.1 10/12/2022 06:28 AM     TSH: No results found for: TSH  Troponin:   Lab Results   Component Value Date/Time    TROPONINT 0.074 10/12/2022 01:21 PM    TROPONINT 0.056 10/11/2022 11:33 PM    TROPONINT 0.048 01/23/2022 06:57 AM     Lactic Acid: No results for input(s): LACTA in the last 72 hours. BNP: No results for input(s): PROBNP in the last 72 hours.   UA:  Lab Results   Component Value Date/Time    NITRU NEGATIVE 10/13/2022 05:15 PM    COLORU YELLOW 10/13/2022 05:15 PM    PHUR 5.0 08/19/2016 09:38 AM    WBCUA <1 10/13/2022 05:15 PM    RBCUA NONE SEEN 10/13/2022 05:15 PM    MUCUS RARE 01/23/2022 02:25 PM    TRICHOMONAS NONE SEEN 10/13/2022 05:15 PM    BACTERIA NEGATIVE 10/13/2022 05:15 PM    CLARITYU CLEAR 10/13/2022 05:15 PM    SPECGRAV 1.015 10/13/2022 05:15 PM    LEUKOCYTESUR TRACE 10/13/2022 05:15 PM    UROBILINOGEN 0.2 10/13/2022 05:15 PM    BILIRUBINUR NEGATIVE 10/13/2022 05:15 PM    BLOODU NEGATIVE 10/13/2022 05:15 PM    KETUA NEGATIVE 10/13/2022 05:15 PM     Urine Cultures: No results found for: LABURIN  Blood Cultures: No results found for: BC  No results found for: BLOODCULT2  Organism: No results found for: ORG    Time Spent Discharging patient 35 minutes    Electronically signed by Анна Parada MD on 10/24/2022 at 10:17 AM

## 2022-10-24 NOTE — PROGRESS NOTES
Reviewed discharge instructions with patient. He is alert and oriented and verbalizes understanding. Wheelchair escort provided by staff to [facility]. Patients family providing private transportation to home.

## 2022-10-24 NOTE — PROGRESS NOTES
GENERAL SURGERY PROGRESS NOTE    Jarod Borjas is a 67 y.o. male with right first toe osteomyelitis. He is now status post right first toe amputation on 10/20/2022. .                 Subjective:    Patient was resting in the room when seen. No further evaluation at this time. Objective:    Vitals: VITALS:  BP (!) 149/84   Pulse 63   Temp 98 °F (36.7 °C) (Oral)   Resp 16   Ht 6' 1\" (1.854 m)   Wt 255 lb (115.7 kg)   SpO2 100%   BMI 33.64 kg/m²   INTAKE/OUTPUT:    Intake/Output Summary (Last 24 hours) at 10/24/2022 0743  Last data filed at 10/24/2022 0526  Gross per 24 hour   Intake 5 ml   Output 1650 ml   Net -1645 ml       TEMPERATURE:  Current - Temp: 98 °F (36.7 °C);  Max - Temp  Av.6 °F (36.4 °C)  Min: 97.3 °F (36.3 °C)  Max: 98 °F (36.7 °C)  RESPIRATIONS RANGE: Resp  Av.4  Min: 10  Max: 16  PULSE RANGE: Pulse  Av.9  Min: 60  Max: 65  BLOOD PRESSURE RANGE:  Systolic (29HBE), EHB:984 , Min:122 , ECJ:122 ; Diastolic (99LZW), GEW:02, Min:68, Max:89  PULSE OXIMETRY RANGE: SpO2  Av %  Min: 96 %  Max: 100 %    I/O: 10/23 0701 - 10/24 0700  In: 5 [I.V.:5]  Out: 6757 [Urine:1650]    Labs/Imaging Results:   No new imaging    IV Fluids:   sodium chloride    sodium chloride    sodium chloride    dextrose    Scheduled Meds:   enoxaparin, 30 mg, SubCUTAneous, Daily    sodium chloride flush, 5-40 mL, IntraVENous, 2 times per day    aspirin, 81 mg, Oral, Daily    clopidogrel, 75 mg, Oral, Daily    magnesium oxide, 400 mg, Oral, BID    carvedilol, 6.25 mg, Oral, BID    hydrALAZINE, 25 mg, Oral, 3 times per day    isosorbide mononitrate, 30 mg, Oral, Daily    lidocaine PF, 5 mL, IntraDERmal, Once    sodium chloride flush, 5-40 mL, IntraVENous, 2 times per day    atorvastatin, 40 mg, Oral, Nightly    [Held by provider] metaxalone, 800 mg, Oral, TID    tiotropium, 2 puff, Inhalation, Daily    sodium chloride flush, 5-40 mL, IntraVENous, 2 times per day    insulin lispro, 0-4 Units, SubCUTAneous, TID WC    insulin lispro, 0-4 Units, SubCUTAneous, Nightly    collagenase, , Topical, Daily    Physical Exam:  General: A&O x 3, no distress. HEENT: Anicteric sclerae, MMM. Extremities: Right first toe amputation site with operative dressing in place, no strikethrough on the bandage. Abdomen: Soft, nontender, nondistended.        Assessment and Plan:    Patient Active Problem List:     PAD (peripheral artery disease) ([facility])     Chronic coronary artery disease     Biventricular ICD (implantable cardioverter-defibrillator) in place     Chronic combined systolic and diastolic heart failure ([facility])     Chronic kidney disease, stage III (moderate) ([facility])     Mixed hyperlipidemia     Sick sinus syndrome ([facility])     Type 2 diabetes mellitus with diabetic polyneuropathy ([facility]     Spinal stenosis of lumbar region     Obesity, Class I, BMI 30-34.9     S/P partial colectomy     Tubulovillous adenoma of colon     Microalbuminuria     WD-PVD (peripheral vascular disease) ([facility])     Limb ischemia     Necrotic toes ([facility])     Toe gangrene ([facility])     Diabetic foot infection ([facility]     Chronic kidney disease (CKD) stage G3a/A2, moderately decreased glomerular filtration rate (GFR) between 45-59 mL/min/1.73 square meter and albuminuria creatinine ratio between  mg/g ([facility])     Edema     ICD (implantable cardioverter-defibrillator) battery depletion     Hyperkalemia     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[facility])     Ischemia of toe     Acute kidney injury ([facility]     Fluid overload     DM (diabetes mellitus) ([facility])     Precordial pain     Acute chest pain     Unstable angina ([facility])     Chronic kidney disease (CKD) stage G3a/A3, moderately decreased glomerular filtration rate (GFR) between 45-59 mL/min/1.73 square meter and albuminuria creatinine ratio greater than 300 mg/g ([facility])     Cardiomyopathy ([facility])     Diabetic neuropathy ([facility])     HTN (hypertension)     Epigastric pain     Acute on chronic congestive heart failure ([facility])     Leg edema     Acute on chronic systolic CHF (congestive heart failure) (HCC)     Diabetic foot ulcer with osteomyelitis ([address]     Visit for wound check     Moderate malnutrition ([address]     Long term (current) use of antibiotics     WD-Diabetic ulcer of toe of right foot associated with type 2 diabetes mellitus, with fat layer exposed ([address]     Diabetic ulcer of toe associated with type 2 diabetes mellitus, with bone involvement without evidence of necrosis (HCC)     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     Persistent wound pain     Receiving intravenous antibiotic treatment as outpatient     Acute on chronic respiratory failure with hypoxemia ([address]     WD-Chronic foot ulcer, left, with necrosis of bone (HCC)     Acute on chronic HFrEF (heart failure with reduced ejection fraction) ([facility])     Scrotal edema     Hypertensive urgency     Diabetic ulcer of right foot due to type 2 diabetes mellitus ([address]     Cellulitis of foot     Toe osteomyelitis (HCC)     Heart failure exacerbated by sotalol (HCC)     CHF (congestive heart failure), NYHA class I, acute on chronic, combined ([address]     Acute on chronic diastolic CHF (congestive heart failure) ([address]     Leg swelling      González Moe is a 67 y.o. male with right first toe osteomyelitis. He is now status post right first toe amputation on 10/20/2022. Patient resting comfortably when seen.    -Okay for diet as tolerated  -Okay for activity as tolerated  -Wound care:  We will plan to remove operative dressing on postoperative day 2, 10/22/2022.  -Multimodal pain control  -Remaining management per primary team    Discussed with Dr. Simba Ybarra MD

## 2022-10-24 NOTE — PROGRESS NOTES
Outpatient Pharmacy Progress Note for Meds-to-Beds    Total number of Prescriptions Filled: 5  The following medications were dispensed to the patient during the discharge process:  Aspirin  Hydralazine  Santyl ointment  Magnesium oxide  Isosorbide mononitrate     Additional Documentation:  Medications given to nurse Justin Cantu to provide to patient due to contain precautions       Thank you for letting us serve your patients.   [address] [facility]    [address] [address] [facility]    Phone: [phone]    Fax: [phone]

## 2022-10-24 NOTE — CARE COORDINATION
Notified [facility] liaison/Darlene of d/c via PS. She will send orders to office. Notified  of need for Jewels Valentine order via PS.   TE

## 2022-10-26 ENCOUNTER — HOSPITAL ENCOUNTER (OUTPATIENT)
Age: 72
Setting detail: SPECIMEN
Discharge: HOME | End: 2022-10-26
Payer: MEDICARE

## 2022-10-26 LAB
ANION GAP SERPL CALCULATED.3IONS-SCNC: 8 MMOL/L (ref 4–16)
BUN BLDV-MCNC: 66 MG/DL (ref 6–23)
CALCIUM SERPL-MCNC: 8.7 MG/DL (ref 8.3–10.6)
CHLORIDE BLD-SCNC: 97 MMOL/L (ref 99–110)
CO2: 33 MMOL/L (ref 21–32)
CREAT SERPL-MCNC: 3 MG/DL (ref 0.9–1.3)
GFR SERPL CREATININE-BSD FRML MDRD: 21 ML/MIN/1.73M2
GLUCOSE BLD-MCNC: 149 MG/DL (ref 70–99)
POTASSIUM SERPL-SCNC: 5.6 MMOL/L (ref 3.5–5.1)
SODIUM BLD-SCNC: 138 MMOL/L (ref 135–145)

## 2022-10-26 PROCEDURE — 80048 BASIC METABOLIC PNL TOTAL CA: CPT

## 2022-10-28 ENCOUNTER — TELEPHONE (OUTPATIENT)
Dept: CARDIOLOGY CLINIC | Age: 72
End: 2022-10-28

## 2022-10-28 NOTE — TELEPHONE ENCOUNTER
Two attempts to call patient to schedule procedure. Left message on voicemail to call back to schedule.

## 2022-10-28 NOTE — TELEPHONE ENCOUNTER
I was able to talk to Mr. Areli Simpson daughter. She states she has an appointment with Dr. Linda Butts on Monday and would like to wait until she talks with him before scheduling a procedure.   Per Dr. Linda Butts, that is OK

## 2022-11-11 ENCOUNTER — HOSPITAL ENCOUNTER (INPATIENT)
Age: 72
LOS: 7 days | Discharge: HOME HEALTH | DRG: 280 | End: 2022-11-18
Attending: EMERGENCY MEDICINE | Admitting: STUDENT IN AN ORGANIZED HEALTH CARE EDUCATION/TRAINING PROGRAM
Payer: MEDICARE

## 2022-11-11 ENCOUNTER — APPOINTMENT (OUTPATIENT)
Dept: GENERAL RADIOLOGY | Age: 72
DRG: 280 | End: 2022-11-11
Payer: MEDICARE

## 2022-11-11 DIAGNOSIS — J18.9 PNEUMONIA DUE TO INFECTIOUS ORGANISM, UNSPECIFIED LATERALITY, UNSPECIFIED PART OF LUNG: ICD-10-CM

## 2022-11-11 DIAGNOSIS — I50.9 ACUTE ON CHRONIC CONGESTIVE HEART FAILURE, UNSPECIFIED HEART FAILURE TYPE (HCC): Primary | ICD-10-CM

## 2022-11-11 PROBLEM — I50.33 ACUTE ON CHRONIC DIASTOLIC HEART FAILURE (HCC): Status: ACTIVE | Noted: 2022-11-11

## 2022-11-11 LAB
ALBUMIN SERPL-MCNC: 3 GM/DL (ref 3.4–5)
ALBUMIN SERPL-MCNC: 3.3 GM/DL (ref 3.4–5)
ALP BLD-CCNC: 121 IU/L (ref 40–129)
ALP BLD-CCNC: 83 IU/L (ref 40–129)
ALT SERPL-CCNC: 10 U/L (ref 10–40)
ALT SERPL-CCNC: 12 U/L (ref 10–40)
ANION GAP SERPL CALCULATED.3IONS-SCNC: 11 MMOL/L (ref 4–16)
ANION GAP SERPL CALCULATED.3IONS-SCNC: 6 MMOL/L (ref 4–16)
AST SERPL-CCNC: 11 IU/L (ref 15–37)
AST SERPL-CCNC: 15 IU/L (ref 15–37)
BACTERIA: NEGATIVE /HPF
BASOPHILS ABSOLUTE: 0 K/CU MM
BASOPHILS RELATIVE PERCENT: 0.3 % (ref 0–1)
BILIRUB SERPL-MCNC: 0.5 MG/DL (ref 0–1)
BILIRUB SERPL-MCNC: 0.7 MG/DL (ref 0–1)
BILIRUBIN URINE: NEGATIVE MG/DL
BLOOD, URINE: NEGATIVE
BUN BLDV-MCNC: 28 MG/DL (ref 6–23)
BUN BLDV-MCNC: 29 MG/DL (ref 6–23)
CALCIUM SERPL-MCNC: 8.2 MG/DL (ref 8.3–10.6)
CALCIUM SERPL-MCNC: 8.8 MG/DL (ref 8.3–10.6)
CHLORIDE BLD-SCNC: 104 MMOL/L (ref 99–110)
CHLORIDE BLD-SCNC: 106 MMOL/L (ref 99–110)
CLARITY: CLEAR
CO2: 23 MMOL/L (ref 21–32)
CO2: 28 MMOL/L (ref 21–32)
COLOR: YELLOW
CREAT SERPL-MCNC: 1.7 MG/DL (ref 0.9–1.3)
CREAT SERPL-MCNC: 2 MG/DL (ref 0.9–1.3)
CREATININE URINE: 57.4 MG/DL (ref 39–259)
CREATININE URINE: 59.2 MG/DL (ref 39–259)
DIFFERENTIAL TYPE: ABNORMAL
EKG ATRIAL RATE: 96 BPM
EKG DIAGNOSIS: NORMAL
EKG P AXIS: 79 DEGREES
EKG P-R INTERVAL: 210 MS
EKG Q-T INTERVAL: 346 MS
EKG QRS DURATION: 96 MS
EKG QTC CALCULATION (BAZETT): 437 MS
EKG R AXIS: 63 DEGREES
EKG T AXIS: 121 DEGREES
EKG VENTRICULAR RATE: 96 BPM
EOSINOPHILS ABSOLUTE: 0 K/CU MM
EOSINOPHILS RELATIVE PERCENT: 0.3 % (ref 0–3)
GFR SERPL CREATININE-BSD FRML MDRD: 35 ML/MIN/1.73M2
GFR SERPL CREATININE-BSD FRML MDRD: 42 ML/MIN/1.73M2
GLUCOSE BLD-MCNC: 137 MG/DL (ref 70–99)
GLUCOSE BLD-MCNC: 137 MG/DL (ref 70–99)
GLUCOSE BLD-MCNC: 158 MG/DL (ref 70–99)
GLUCOSE BLD-MCNC: 169 MG/DL (ref 70–99)
GLUCOSE BLD-MCNC: 179 MG/DL (ref 70–99)
GLUCOSE BLD-MCNC: 181 MG/DL (ref 70–99)
GLUCOSE BLD-MCNC: 193 MG/DL (ref 70–99)
GLUCOSE, URINE: NEGATIVE MG/DL
HCT VFR BLD CALC: 31.4 % (ref 42–52)
HEMOGLOBIN: 9.3 GM/DL (ref 13.5–18)
HYALINE CASTS: 5 /LPF
IMMATURE NEUTROPHIL %: 0.2 % (ref 0–0.43)
KETONES, URINE: NEGATIVE MG/DL
LACTATE: 0.8 MMOL/L (ref 0.4–2)
LACTATE: 2.5 MMOL/L (ref 0.4–2)
LEUKOCYTE ESTERASE, URINE: NEGATIVE
LYMPHOCYTES ABSOLUTE: 0.4 K/CU MM
LYMPHOCYTES RELATIVE PERCENT: 4.7 % (ref 24–44)
MCH RBC QN AUTO: 26.6 PG (ref 27–31)
MCHC RBC AUTO-ENTMCNC: 29.6 % (ref 32–36)
MCV RBC AUTO: 89.7 FL (ref 78–100)
MONOCYTES ABSOLUTE: 0.2 K/CU MM
MONOCYTES RELATIVE PERCENT: 1.6 % (ref 0–4)
MUCUS: ABNORMAL HPF
NITRITE URINE, QUANTITATIVE: NEGATIVE
NUCLEATED RBC %: 0 %
PDW BLD-RTO: 17.5 % (ref 11.7–14.9)
PH, URINE: 5 (ref 5–8)
PLATELET # BLD: 212 K/CU MM (ref 140–440)
PMV BLD AUTO: 9.8 FL (ref 7.5–11.1)
POTASSIUM SERPL-SCNC: 5.2 MMOL/L (ref 3.5–5.1)
POTASSIUM SERPL-SCNC: 5.5 MMOL/L (ref 3.5–5.1)
POTASSIUM, UR: 27.8 MMOL/L (ref 22–119)
PRO-BNP: 9525 PG/ML
PROT/CREAT RATIO, UR: 1
PROTEIN UA: 30 MG/DL
RAPID INFLUENZA  B AGN: NEGATIVE
RAPID INFLUENZA A AGN: NEGATIVE
RBC # BLD: 3.5 M/CU MM (ref 4.6–6.2)
RBC URINE: ABNORMAL /HPF (ref 0–3)
SARS-COV-2, NAAT: NOT DETECTED
SEGMENTED NEUTROPHILS ABSOLUTE COUNT: 8.6 K/CU MM
SEGMENTED NEUTROPHILS RELATIVE PERCENT: 92.9 % (ref 36–66)
SODIUM BLD-SCNC: 138 MMOL/L (ref 135–145)
SODIUM BLD-SCNC: 140 MMOL/L (ref 135–145)
SODIUM URINE: 85 MMOL/L (ref 35–167)
SOURCE: NORMAL
SPECIFIC GRAVITY UA: 1.02 (ref 1–1.03)
SQUAMOUS EPITHELIAL: <1 /HPF
TOTAL IMMATURE NEUTOROPHIL: 0.02 K/CU MM
TOTAL NUCLEATED RBC: 0 K/CU MM
TOTAL PROTEIN: 6.3 GM/DL (ref 6.4–8.2)
TOTAL PROTEIN: 7.5 GM/DL (ref 6.4–8.2)
TRICHOMONAS: ABNORMAL /HPF
TROPONIN T: 0.06 NG/ML
URINE TOTAL PROTEIN: 58.7 MG/DL
UROBILINOGEN, URINE: 0.2 MG/DL (ref 0.2–1)
WBC # BLD: 9.2 K/CU MM (ref 4–10.5)
WBC UA: <1 /HPF (ref 0–2)

## 2022-11-11 PROCEDURE — 96365 THER/PROPH/DIAG IV INF INIT: CPT

## 2022-11-11 PROCEDURE — 6370000000 HC RX 637 (ALT 250 FOR IP): Performed by: EMERGENCY MEDICINE

## 2022-11-11 PROCEDURE — 84156 ASSAY OF PROTEIN URINE: CPT

## 2022-11-11 PROCEDURE — 84133 ASSAY OF URINE POTASSIUM: CPT

## 2022-11-11 PROCEDURE — 87150 DNA/RNA AMPLIFIED PROBE: CPT

## 2022-11-11 PROCEDURE — 6370000000 HC RX 637 (ALT 250 FOR IP): Performed by: STUDENT IN AN ORGANIZED HEALTH CARE EDUCATION/TRAINING PROGRAM

## 2022-11-11 PROCEDURE — 87635 SARS-COV-2 COVID-19 AMP PRB: CPT

## 2022-11-11 PROCEDURE — 82962 GLUCOSE BLOOD TEST: CPT

## 2022-11-11 PROCEDURE — 87040 BLOOD CULTURE FOR BACTERIA: CPT

## 2022-11-11 PROCEDURE — 99213 OFFICE O/P EST LOW 20 MIN: CPT

## 2022-11-11 PROCEDURE — 2580000003 HC RX 258: Performed by: EMERGENCY MEDICINE

## 2022-11-11 PROCEDURE — 84300 ASSAY OF URINE SODIUM: CPT

## 2022-11-11 PROCEDURE — 84540 ASSAY OF URINE/UREA-N: CPT

## 2022-11-11 PROCEDURE — 80053 COMPREHEN METABOLIC PANEL: CPT

## 2022-11-11 PROCEDURE — 6360000002 HC RX W HCPCS: Performed by: EMERGENCY MEDICINE

## 2022-11-11 PROCEDURE — 2580000003 HC RX 258: Performed by: STUDENT IN AN ORGANIZED HEALTH CARE EDUCATION/TRAINING PROGRAM

## 2022-11-11 PROCEDURE — 87804 INFLUENZA ASSAY W/OPTIC: CPT

## 2022-11-11 PROCEDURE — 2140000000 HC CCU INTERMEDIATE R&B

## 2022-11-11 PROCEDURE — 6370000000 HC RX 637 (ALT 250 FOR IP): Performed by: INTERNAL MEDICINE

## 2022-11-11 PROCEDURE — 87186 SC STD MICRODIL/AGAR DIL: CPT

## 2022-11-11 PROCEDURE — 82570 ASSAY OF URINE CREATININE: CPT

## 2022-11-11 PROCEDURE — 83605 ASSAY OF LACTIC ACID: CPT

## 2022-11-11 PROCEDURE — 93010 ELECTROCARDIOGRAM REPORT: CPT | Performed by: INTERNAL MEDICINE

## 2022-11-11 PROCEDURE — 85025 COMPLETE CBC W/AUTO DIFF WBC: CPT

## 2022-11-11 PROCEDURE — 94761 N-INVAS EAR/PLS OXIMETRY MLT: CPT

## 2022-11-11 PROCEDURE — 6360000002 HC RX W HCPCS: Performed by: INTERNAL MEDICINE

## 2022-11-11 PROCEDURE — 81001 URINALYSIS AUTO W/SCOPE: CPT

## 2022-11-11 PROCEDURE — 99285 EMERGENCY DEPT VISIT HI MDM: CPT

## 2022-11-11 PROCEDURE — 93005 ELECTROCARDIOGRAM TRACING: CPT | Performed by: EMERGENCY MEDICINE

## 2022-11-11 PROCEDURE — 2700000000 HC OXYGEN THERAPY PER DAY

## 2022-11-11 PROCEDURE — 99223 1ST HOSP IP/OBS HIGH 75: CPT | Performed by: INTERNAL MEDICINE

## 2022-11-11 PROCEDURE — 71045 X-RAY EXAM CHEST 1 VIEW: CPT

## 2022-11-11 PROCEDURE — 6360000002 HC RX W HCPCS: Performed by: STUDENT IN AN ORGANIZED HEALTH CARE EDUCATION/TRAINING PROGRAM

## 2022-11-11 PROCEDURE — 83880 ASSAY OF NATRIURETIC PEPTIDE: CPT

## 2022-11-11 PROCEDURE — 84484 ASSAY OF TROPONIN QUANT: CPT

## 2022-11-11 PROCEDURE — 94640 AIRWAY INHALATION TREATMENT: CPT

## 2022-11-11 RX ORDER — CARVEDILOL 6.25 MG/1
3.12 TABLET ORAL 2 TIMES DAILY WITH MEALS
Status: DISCONTINUED | OUTPATIENT
Start: 2022-11-11 | End: 2022-11-13

## 2022-11-11 RX ORDER — SODIUM CHLORIDE 0.9 % (FLUSH) 0.9 %
5-40 SYRINGE (ML) INJECTION EVERY 12 HOURS SCHEDULED
Status: DISCONTINUED | OUTPATIENT
Start: 2022-11-11 | End: 2022-11-18 | Stop reason: HOSPADM

## 2022-11-11 RX ORDER — FUROSEMIDE 10 MG/ML
80 INJECTION INTRAMUSCULAR; INTRAVENOUS 3 TIMES DAILY
Status: DISCONTINUED | OUTPATIENT
Start: 2022-11-11 | End: 2022-11-13

## 2022-11-11 RX ORDER — METOLAZONE 2.5 MG/1
2.5 TABLET ORAL 2 TIMES DAILY
Status: DISCONTINUED | OUTPATIENT
Start: 2022-11-11 | End: 2022-11-18 | Stop reason: HOSPADM

## 2022-11-11 RX ORDER — ONDANSETRON 4 MG/1
4 TABLET, ORALLY DISINTEGRATING ORAL EVERY 8 HOURS PRN
Status: DISCONTINUED | OUTPATIENT
Start: 2022-11-11 | End: 2022-11-18 | Stop reason: HOSPADM

## 2022-11-11 RX ORDER — OXYCODONE HYDROCHLORIDE AND ACETAMINOPHEN 5; 325 MG/1; MG/1
1 TABLET ORAL 2 TIMES DAILY PRN
Status: COMPLETED | OUTPATIENT
Start: 2022-11-11 | End: 2022-11-14

## 2022-11-11 RX ORDER — ONDANSETRON 2 MG/ML
4 INJECTION INTRAMUSCULAR; INTRAVENOUS EVERY 6 HOURS PRN
Status: DISCONTINUED | OUTPATIENT
Start: 2022-11-11 | End: 2022-11-18 | Stop reason: HOSPADM

## 2022-11-11 RX ORDER — SODIUM CHLORIDE 9 MG/ML
INJECTION, SOLUTION INTRAVENOUS PRN
Status: DISCONTINUED | OUTPATIENT
Start: 2022-11-11 | End: 2022-11-18 | Stop reason: HOSPADM

## 2022-11-11 RX ORDER — ISOSORBIDE MONONITRATE 30 MG/1
30 TABLET, EXTENDED RELEASE ORAL DAILY
Status: DISCONTINUED | OUTPATIENT
Start: 2022-11-11 | End: 2022-11-14

## 2022-11-11 RX ORDER — ACETAMINOPHEN 650 MG/1
650 SUPPOSITORY RECTAL EVERY 6 HOURS PRN
Status: DISCONTINUED | OUTPATIENT
Start: 2022-11-11 | End: 2022-11-18 | Stop reason: HOSPADM

## 2022-11-11 RX ORDER — CLOPIDOGREL BISULFATE 75 MG/1
75 TABLET ORAL DAILY
Status: DISCONTINUED | OUTPATIENT
Start: 2022-11-11 | End: 2022-11-18 | Stop reason: HOSPADM

## 2022-11-11 RX ORDER — ALBUTEROL SULFATE 90 UG/1
2 AEROSOL, METERED RESPIRATORY (INHALATION) EVERY 4 HOURS PRN
Status: DISCONTINUED | OUTPATIENT
Start: 2022-11-11 | End: 2022-11-18 | Stop reason: HOSPADM

## 2022-11-11 RX ORDER — INSULIN LISPRO 100 [IU]/ML
0-4 INJECTION, SOLUTION INTRAVENOUS; SUBCUTANEOUS NIGHTLY
Status: DISCONTINUED | OUTPATIENT
Start: 2022-11-11 | End: 2022-11-18 | Stop reason: HOSPADM

## 2022-11-11 RX ORDER — SODIUM CHLORIDE 0.9 % (FLUSH) 0.9 %
5-40 SYRINGE (ML) INJECTION PRN
Status: DISCONTINUED | OUTPATIENT
Start: 2022-11-11 | End: 2022-11-18 | Stop reason: HOSPADM

## 2022-11-11 RX ORDER — DEXTROSE MONOHYDRATE 100 MG/ML
INJECTION, SOLUTION INTRAVENOUS CONTINUOUS PRN
Status: DISCONTINUED | OUTPATIENT
Start: 2022-11-11 | End: 2022-11-18 | Stop reason: HOSPADM

## 2022-11-11 RX ORDER — ACETAMINOPHEN 325 MG/1
650 TABLET ORAL EVERY 6 HOURS PRN
Status: DISCONTINUED | OUTPATIENT
Start: 2022-11-11 | End: 2022-11-18 | Stop reason: HOSPADM

## 2022-11-11 RX ORDER — INSULIN LISPRO 100 [IU]/ML
0-4 INJECTION, SOLUTION INTRAVENOUS; SUBCUTANEOUS
Status: DISCONTINUED | OUTPATIENT
Start: 2022-11-11 | End: 2022-11-18 | Stop reason: HOSPADM

## 2022-11-11 RX ORDER — ENOXAPARIN SODIUM 100 MG/ML
30 INJECTION SUBCUTANEOUS 2 TIMES DAILY
Status: DISCONTINUED | OUTPATIENT
Start: 2022-11-12 | End: 2022-11-13

## 2022-11-11 RX ORDER — LANOLIN ALCOHOL/MO/W.PET/CERES
400 CREAM (GRAM) TOPICAL 2 TIMES DAILY
Status: DISCONTINUED | OUTPATIENT
Start: 2022-11-11 | End: 2022-11-18 | Stop reason: HOSPADM

## 2022-11-11 RX ORDER — OXYCODONE HYDROCHLORIDE AND ACETAMINOPHEN 5; 325 MG/1; MG/1
1 TABLET ORAL ONCE
Status: COMPLETED | OUTPATIENT
Start: 2022-11-11 | End: 2022-11-11

## 2022-11-11 RX ORDER — MILRINONE LACTATE 0.2 MG/ML
0.06 INJECTION, SOLUTION INTRAVENOUS CONTINUOUS
Status: DISCONTINUED | OUTPATIENT
Start: 2022-11-11 | End: 2022-11-12

## 2022-11-11 RX ORDER — POLYETHYLENE GLYCOL 3350 17 G/17G
17 POWDER, FOR SOLUTION ORAL DAILY PRN
Status: DISCONTINUED | OUTPATIENT
Start: 2022-11-11 | End: 2022-11-18 | Stop reason: HOSPADM

## 2022-11-11 RX ORDER — ATORVASTATIN CALCIUM 40 MG/1
40 TABLET, FILM COATED ORAL NIGHTLY
Status: DISCONTINUED | OUTPATIENT
Start: 2022-11-11 | End: 2022-11-18 | Stop reason: HOSPADM

## 2022-11-11 RX ORDER — FUROSEMIDE 10 MG/ML
80 INJECTION INTRAMUSCULAR; INTRAVENOUS 2 TIMES DAILY
Status: DISCONTINUED | OUTPATIENT
Start: 2022-11-11 | End: 2022-11-11

## 2022-11-11 RX ORDER — ASPIRIN 81 MG/1
81 TABLET, CHEWABLE ORAL DAILY
Status: DISCONTINUED | OUTPATIENT
Start: 2022-11-11 | End: 2022-11-18 | Stop reason: HOSPADM

## 2022-11-11 RX ORDER — ACETAMINOPHEN 325 MG/1
650 TABLET ORAL ONCE
Status: COMPLETED | OUTPATIENT
Start: 2022-11-11 | End: 2022-11-11

## 2022-11-11 RX ORDER — HYDRALAZINE HYDROCHLORIDE 25 MG/1
25 TABLET, FILM COATED ORAL EVERY 8 HOURS SCHEDULED
Status: DISCONTINUED | OUTPATIENT
Start: 2022-11-11 | End: 2022-11-11

## 2022-11-11 RX ADMIN — FUROSEMIDE 80 MG: 10 INJECTION, SOLUTION INTRAVENOUS at 08:43

## 2022-11-11 RX ADMIN — VANCOMYCIN HYDROCHLORIDE 2000 MG: 1 INJECTION, POWDER, LYOPHILIZED, FOR SOLUTION INTRAVENOUS at 04:48

## 2022-11-11 RX ADMIN — TIOTROPIUM BROMIDE INHALATION SPRAY 2 PUFF: 3.12 SPRAY, METERED RESPIRATORY (INHALATION) at 10:37

## 2022-11-11 RX ADMIN — METOLAZONE 2.5 MG: 2.5 TABLET ORAL at 14:23

## 2022-11-11 RX ADMIN — FUROSEMIDE 80 MG: 10 INJECTION, SOLUTION INTRAVENOUS at 05:07

## 2022-11-11 RX ADMIN — FUROSEMIDE 80 MG: 10 INJECTION, SOLUTION INTRAMUSCULAR; INTRAVENOUS at 21:43

## 2022-11-11 RX ADMIN — OXYCODONE AND ACETAMINOPHEN 1 TABLET: 5; 325 TABLET ORAL at 21:43

## 2022-11-11 RX ADMIN — CARVEDILOL 3.12 MG: 6.25 TABLET, FILM COATED ORAL at 17:14

## 2022-11-11 RX ADMIN — FUROSEMIDE 80 MG: 10 INJECTION, SOLUTION INTRAMUSCULAR; INTRAVENOUS at 14:23

## 2022-11-11 RX ADMIN — SODIUM ZIRCONIUM CYCLOSILICATE 10 G: 10 POWDER, FOR SUSPENSION ORAL at 08:42

## 2022-11-11 RX ADMIN — OXYCODONE AND ACETAMINOPHEN 1 TABLET: 5; 325 TABLET ORAL at 08:48

## 2022-11-11 RX ADMIN — ISOSORBIDE MONONITRATE 30 MG: 30 TABLET, EXTENDED RELEASE ORAL at 08:42

## 2022-11-11 RX ADMIN — ASPIRIN 81 MG CHEWABLE TABLET 81 MG: 81 TABLET CHEWABLE at 08:42

## 2022-11-11 RX ADMIN — OXYCODONE HYDROCHLORIDE AND ACETAMINOPHEN 1 TABLET: 5; 325 TABLET ORAL at 02:01

## 2022-11-11 RX ADMIN — CEFTRIAXONE 1000 MG: 1 INJECTION, POWDER, FOR SOLUTION INTRAMUSCULAR; INTRAVENOUS at 02:58

## 2022-11-11 RX ADMIN — MAGNESIUM OXIDE TAB 400 MG (241.3 MG ELEMENTAL MG) 400 MG: 400 (241.3 MG) TAB at 08:42

## 2022-11-11 RX ADMIN — SODIUM CHLORIDE, PRESERVATIVE FREE 10 ML: 5 INJECTION INTRAVENOUS at 08:59

## 2022-11-11 RX ADMIN — METOLAZONE 2.5 MG: 2.5 TABLET ORAL at 21:43

## 2022-11-11 RX ADMIN — SODIUM CHLORIDE, PRESERVATIVE FREE 10 ML: 5 INJECTION INTRAVENOUS at 21:44

## 2022-11-11 RX ADMIN — MILRINONE LACTATE IN DEXTROSE 0.06 MCG/KG/MIN: 200 INJECTION, SOLUTION INTRAVENOUS at 15:05

## 2022-11-11 RX ADMIN — ATORVASTATIN CALCIUM 40 MG: 40 TABLET, FILM COATED ORAL at 21:43

## 2022-11-11 RX ADMIN — ACETAMINOPHEN 650 MG: 325 TABLET ORAL at 02:01

## 2022-11-11 RX ADMIN — CARVEDILOL 3.12 MG: 6.25 TABLET, FILM COATED ORAL at 10:34

## 2022-11-11 RX ADMIN — MAGNESIUM OXIDE TAB 400 MG (241.3 MG ELEMENTAL MG) 400 MG: 400 (241.3 MG) TAB at 21:43

## 2022-11-11 RX ADMIN — SODIUM ZIRCONIUM CYCLOSILICATE 10 G: 10 POWDER, FOR SUSPENSION ORAL at 21:43

## 2022-11-11 RX ADMIN — CLOPIDOGREL BISULFATE 75 MG: 75 TABLET ORAL at 08:42

## 2022-11-11 ASSESSMENT — LIFESTYLE VARIABLES
HOW OFTEN DO YOU HAVE A DRINK CONTAINING ALCOHOL: NEVER
HOW MANY STANDARD DRINKS CONTAINING ALCOHOL DO YOU HAVE ON A TYPICAL DAY: PATIENT DOES NOT DRINK

## 2022-11-11 ASSESSMENT — PAIN DESCRIPTION - LOCATION
LOCATION: FOOT
LOCATION: FOOT;TOE (COMMENT WHICH ONE)

## 2022-11-11 ASSESSMENT — PAIN SCALES - GENERAL
PAINLEVEL_OUTOF10: 0
PAINLEVEL_OUTOF10: 7
PAINLEVEL_OUTOF10: 9

## 2022-11-11 ASSESSMENT — PAIN DESCRIPTION - ORIENTATION
ORIENTATION: RIGHT;LEFT
ORIENTATION: RIGHT;LEFT

## 2022-11-11 ASSESSMENT — PAIN DESCRIPTION - DESCRIPTORS: DESCRIPTORS: ACHING

## 2022-11-11 ASSESSMENT — PAIN DESCRIPTION - PAIN TYPE: TYPE: ACUTE PAIN

## 2022-11-11 NOTE — CONSULTS
[facility] Wound Ostomy Continence Nurse  Consult Note       Naseem Levin  AGE: 67 y.o. GENDER: male  : 1950  TODAY'S DATE:  2022    Subjective:     Reason for CWOCN Evaluation and Assessment: wound assessment      Naseem Levin is a 67 y.o. male referred by:   [x] Physician  [] Nursing  [] Other:     Wound Identification:  Wound Type: diabetic, non-healing surgical, and lymphedema  Contributing Factors: edema, lymphedema, diabetes, poor glucose control, chronic pressure, decreased mobility, non-adherence, and ESRD        PAST MEDICAL HISTORY        Diagnosis Date    Acid reflux     Acute MI ([facility] ,     Arthritis     Back    Broken teeth     Upper Front    CAD (coronary artery disease)     Sees Dr. Chalino [facility])     per old chart    CHF (congestive heart failure) ([facility]     Chronic back pain     Chronic kidney disease     STAGE 3 KIDNEY FAILURE- \"from my diabetes- do not follow with any one- have seen Dr Marta Porter in the past\"    Diabetes mellitus ([facility] Dx 1965    per old chart pt has been diabetic since age 13    Diabetic neuropathy ([facility]     \"in my feet\"    H/O cardiovascular stress test 2016    H/O Doppler ultrasound 2018    Moderate disease of the right lower extremity with an JALEN of 0.72. Moderate to severe disease of the left lower extremity with an JALEN of 0.55.     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    Neuropathy     both feet    neuropathy     PAD (peripheral artery disease) ([facility] 2018    PVD (peripheral vascular disease) ([facility]     Sick sinus syndrome (HCC)     Sleep apnea \"sleep study 3 yrs ago- could not tolerate the cpap made me too dry\"    Spinal stenosis     Teeth missing     Upper And Lower    Type 2 diabetes mellitus without complication ([facility]     WD-Chronic foot ulcer, left, with necrosis of bone ([facility] 11/12/2021       PAST SURGICAL HISTORY    Past Surgical History:   Procedure Laterality Date    CARDIAC CATHETERIZATION      per old chart done 10/2014    CARDIAC CATHETERIZATION  07/14/2017    with angiography of leg    CARDIAC CATHETERIZATION  11/20/2018    PATENT STENTS OF ALL THREE MAJOR VESSELS    CARDIAC DEFIBRILLATOR PLACEMENT  06/04/2010    [employer] Secura DR Defibrillator Implanted    COLECTOMY Right 08/26/2016    laparascopic; robotic assisted    COLONOSCOPY  08/04/2016    CORONARY ANGIOPLASTY      \"15 Heart Stents\"    CORONARY ANGIOPLASTY WITH STENT PLACEMENT      per old chart had angio with stent to circumflex and obtuse marginal artery at [facility] 5/2010( old chart also gives hx of stent placement done 2000,2004 and 2005)    DENTAL SURGERY      Teeth Extracted In Past    IR TUNNELED CATHETER PLACEMENT GREATER THAN 5 YEARS  6/14/2021    IR TUNNELED CATHETER PLACEMENT GREATER THAN 5 YEARS 6/14/2021 [facility] SPECIAL PROCEDURES    PACEMAKER PLACEMENT  06/04/2010    [employer]tronic Secura DR Defibrillator Implanted    TOE AMPUTATION Right 09/12/2017    Rt 3rd toe    TOE AMPUTATION Right 01/09/2018     Right 5th toe amputation and Toenails trimmed left 2,3,4 and 5th toes    TOE AMPUTATION Left 12/26/2020    LEFT GREAT TOE AMPUTATION performed by Evangelista Mcintyre MD at [address] Left 7/26/2022    LEFT SECOND TOE AMPUTATION performed by Ascencion Charles MD at [address] Right 10/20/2022    Right First TOE AMPUTATION performed by Ascencion Charles MD at [address] [place] [address]      per old chart had balloon angioplasty right superfical femoral artery,right popliteal artery,,right ant.tibial artery, right tibioperoneal trunk, and right post.tibial artery wna stent placement right popliteal artery and superfical femoral artery 2012       FAMILY HISTORY    Family History   Problem Relation Age of Onset    Diabetes Mother     Stroke Mother     High Blood Pressure Mother     Vision Loss Mother     Cancer Father         Prostate Cancer    Diabetes Sister     Neuropathy Sister     Other Sister         \"Breathing Problems\"    Heart Disease Sister     Early Death Sister 62        Heart Complications    Cancer Brother         \"Stomach Cancer\"    High Blood Pressure Brother     Diabetes Brother     Heart Disease Brother     High Blood Pressure Brother     Cancer Son         \"Testicle Cancer\"       SOCIAL HISTORY    Social History     Tobacco Use    Smoking status: Former     Packs/day: 0.25     Years: 36.00     Pack years: 9.00     Types: Cigars, Cigarettes     Start date: 1980     Quit date: 10/22/2021     Years since quittin.0    Smokeless tobacco: Never    Tobacco comments:     Client states he has stopped smoking   Vaping Use    Vaping Use: Never used   Substance Use Topics    Alcohol use: No    Drug use: Yes     Types: Marijuana (Weed)       ALLERGIES    Allergies   Allergen Reactions    Pcn [Penicillins] Hives    Fentanyl Itching       MEDICATIONS    No current facility-administered medications on file prior to encounter.      Current Outpatient Medications on File Prior to Encounter   Medication Sig Dispense Refill    aspirin 81 MG chewable tablet Take 1 tablet by mouth daily 30 tablet 3    isosorbide mononitrate (IMDUR) 30 MG extended release tablet Take 1 tablet by mouth daily 30 tablet 3    hydrALAZINE (APRESOLINE) 25 MG tablet Take 1 tablet by mouth every 8 hours 90 tablet 3    magnesium oxide (MAG-OX) 400 (240 Mg) MG tablet Take 1 tablet by mouth 2 times daily 30 tablet 0    oxyCODONE-acetaminophen (PERCOCET) 5-325 MG per tablet Take 1 tablet by mouth 2 times daily as needed for Pain.      torsemide (DEMADEX) 20 MG tablet Take 2 tablets by mouth daily 60 tablet 0    gabapentin (NEURONTIN) 300 MG capsule TAKE 1 CAPSULE BY MOUTH 3 TIMES DAILY FOR 90 DAYS.  90 capsule 3    SPIRIVA HANDIHALER 18 MCG inhalation capsule Inhale 18 mcg into the lungs daily      atorvastatin (LIPITOR) 40 MG tablet Take 1 tablet by mouth nightly 30 tablet 3    carvedilol (COREG) 3.125 MG tablet Take 1 tablet by mouth 2 times daily 60 tablet 3    albuterol sulfate HFA (VENTOLIN HFA) 108 (90 Base) MCG/ACT inhaler Inhale 2 puffs into the lungs every 4 hours as needed for Wheezing 1 Inhaler 3    clopidogrel (PLAVIX) 75 MG tablet Take 1 tablet by mouth daily 30 tablet 11         Objective:      BP (!) 137/57   Pulse 65   Temp 97.3 °F (36.3 °C) (Oral)   Resp 19   Ht 6' (1.829 m)   Wt 273 lb 5.9 oz (124 kg)   SpO2 100%   BMI 37.08 kg/m²   Pablo Risk Score: Pablo Scale Score: 15    LABS    CBC:   Lab Results   Component Value Date/Time    WBC 9.2 11/11/2022 12:50 AM    RBC 3.50 11/11/2022 12:50 AM    HGB 9.3 11/11/2022 12:50 AM    HCT 31.4 11/11/2022 12:50 AM    MCV 89.7 11/11/2022 12:50 AM    MCH 26.6 11/11/2022 12:50 AM    MCHC 29.6 11/11/2022 12:50 AM    RDW 17.5 11/11/2022 12:50 AM     11/11/2022 12:50 AM    MPV 9.8 11/11/2022 12:50 AM     CMP:    Lab Results   Component Value Date/Time     11/11/2022 12:50 AM    K 5.5 11/11/2022 12:50 AM    K 5.1 01/10/2018 04:32 AM     11/11/2022 12:50 AM    CO2 23 11/11/2022 12:50 AM    BUN 28 11/11/2022 12:50 AM    CREATININE 1.7 11/11/2022 12:50 AM    GFRAA 25 10/17/2022 06:50 AM    LABGLOM 42 11/11/2022 12:50 AM    GLUCOSE 193 11/11/2022 12:50 AM    PROT 7.5 11/11/2022 12:50 AM    PROT 6.3 01/21/2013 12:10 PM    LABALBU 3.3 11/11/2022 12:50 AM    CALCIUM 8.8 11/11/2022 12:50 AM    BILITOT 0.7 11/11/2022 12:50 AM    ALKPHOS 121 11/11/2022 12:50 AM    AST 15 11/11/2022 12:50 AM    ALT 12 11/11/2022 12:50 AM     Albumin:    Lab Results   Component Value Date/Time    LABALBU 3.3 11/11/2022 12:50 AM     PT/INR:    Lab Results Component Value Date/Time    PROTIME 13.3 08/30/2021 08:40 AM    PROTIME 11.2 09/08/2011 06:02 PM    INR 1.03 08/30/2021 08:40 AM     HgBA1c:    Lab Results   Component Value Date/Time    LABA1C 7.1 10/12/2022 06:28 AM         Assessment:     Patient Active Problem List   Diagnosis    PAD (peripheral artery disease) ([facility])    Chronic coronary artery disease    Biventricular ICD (implantable cardioverter-defibrillator) in place    Chronic combined systolic and diastolic heart failure ([facility])    Chronic kidney disease, stage III (moderate) ([facility])    Mixed hyperlipidemia    Sick sinus syndrome ([facility])    Type 2 diabetes mellitus with diabetic polyneuropathy ([facility])    Spinal stenosis of lumbar region    Obesity, Class I, BMI 30-34.9    S/P partial colectomy    Tubulovillous adenoma of colon    Microalbuminuria    WD-PVD (peripheral vascular disease) ([facility])    Limb ischemia    Necrotic toes ([facility])    Toe gangrene ([facility])    Diabetic foot infection ([facility]    Chronic kidney disease (CKD) stage G3a/A2, moderately decreased glomerular filtration rate (GFR) between 45-59 mL/min/1.73 square meter and albuminuria creatinine ratio between  mg/g ([facility])    Edema    ICD (implantable cardioverter-defibrillator) battery depletion    Hyperkalemia    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[facility])    Ischemia of toe    Acute kidney injury ([address]    Fluid overload    DM (diabetes mellitus) ([facility])    Precordial pain    Acute chest pain    Unstable angina ([facility])    Chronic kidney disease (CKD) stage G3a/A3, moderately decreased glomerular filtration rate (GFR) between 45-59 mL/min/1.73 square meter and albuminuria creatinine ratio greater than 300 mg/g ([facility])    Cardiomyopathy (HCC)    Diabetic neuropathy ([facility])    HTN (hypertension)    Epigastric pain    Acute on chronic congestive heart failure ([facility])    Leg edema    Acute on chronic systolic CHF (congestive heart failure) ([facility])    Diabetic foot ulcer with osteomyelitis ([address]    Visit for wound check    Moderate malnutrition [facility])    Long term (current) use of antibiotics    WD-Diabetic ulcer of toe of right foot associated with type 2 diabetes mellitus, with fat layer exposed ([address]    Diabetic ulcer of toe associated with type 2 diabetes mellitus, with bone involvement without evidence of necrosis ([address]    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    Persistent wound pain    Receiving intravenous antibiotic treatment as outpatient    Acute on chronic respiratory failure with hypoxemia ([address]    WD-Chronic foot ulcer, left, with necrosis of bone (HCC)    Acute on chronic HFrEF (heart failure with reduced ejection fraction) ([address]    Scrotal edema    Hypertensive urgency    Diabetic ulcer of right foot due to type 2 diabetes mellitus ([address]    Cellulitis of foot    Toe osteomyelitis ([address]    Heart failure exacerbated by sotalol (HCC)    CHF (congestive heart failure), NYHA class I, acute on chronic, combined (HCC)    Acute on chronic diastolic CHF (congestive heart failure) (HCC)    Leg swelling    Acute on chronic diastolic heart failure ([address]       Measurements:  Wound 07/06/17 Other (Comment) Toe (Comment  which one) (Active)   Number of days: 1953       Wound 12/10/20 Foot Left;Lateral fluid filled blister (Active)   Number of days: 700       Wound 10/30/21   #1 Left Dorsal Great Toe amp site  (Active)   Number of days: 377       Wound 10/30/21   #2 Left Dorsal Second Toe  (Active)   Number of days: 377       Wound 10/30/21 #3 Right Great Toe (Active)   Number of days: 377       Wound 02/25/22 #7 Right Dorsal 2nd Toe (Active)   Number of days: 258       Wound 07/19/22 Toe (Comment  which one) Anterior; Left #2 GR (Active)   Wound Image   11/11/22 1115   Wound Etiology Diabetic 11/11/22 1115   Dressing Status New dressing applied 11/11/22 1115   Wound Cleansed Cleansed with saline 11/11/22 1115   Dressing/Treatment ABD; Moist to dry; Roll gauze 11/11/22 1115   Wound Length (cm) 1 cm 11/11/22 1115   Wound Width (cm) 1.8 cm 11/11/22 1115   Wound Depth (cm) 0.3 cm 11/11/22 1115   Wound Surface Area (cm^2) 1.8 cm^2 11/11/22 1115   Change in Wound Size % (l*w) 55.56 11/11/22 1115   Wound Volume (cm^3) 0.54 cm^3 11/11/22 1115   Wound Healing % -33 11/11/22 1115   Distance Tunneling (cm) 0 cm 11/11/22 1115   Tunneling Position ___ O'Clock 0 11/11/22 1115   Undermining Starts ___ O'Clock 0 11/11/22 1115   Undermining Ends___ O'Clock 0 11/11/22 1115   Undermining Maxium Distance (cm) 0 11/11/22 1115   Wound Assessment Dry;Pink/red;Slough 11/11/22 1115   Drainage Amount Small 11/11/22 1115   Drainage Description Serosanguinous 11/11/22 1115   Odor None 11/11/22 1115   Dina-wound Assessment Intact 11/11/22 1115   Margins Defined edges 11/11/22 1115   Wound Thickness Description not for Pressure Injury Full thickness 11/11/22 1115   Number of days: 115       Wound 07/19/22 Toe (Comment  which one) Anterior; Left #3 2ND (Active)   Wound Image   10/19/22 1000   Wound Etiology Diabetic 11/11/22 1115   Dressing Status New dressing applied 11/11/22 1115   Wound Cleansed Cleansed with saline 11/11/22 1115   Dressing/Treatment ABD; Moist to dry; Roll gauze 11/11/22 1115   Wound Length (cm) 2.2 cm 11/11/22 1115   Wound Width (cm) 1.3 cm 11/11/22 1115   Wound Depth (cm) 0.5 cm 11/11/22 1115   Wound Surface Area (cm^2) 2.86 cm^2 11/11/22 1115   Change in Wound Size % (l*w) 61.76 11/11/22 1115   Wound Volume (cm^3) 1.43 cm^3 11/11/22 1115   Wound Healing % 36 11/11/22 1115   Distance Tunneling (cm) 0 cm 11/11/22 1115   Tunneling Position ___ O'Clock 0 11/11/22 1115   Undermining Starts ___ O'Clock 0 11/11/22 1115   Undermining Ends___ O'Clock 0 11/11/22 1115   Undermining Maxium Distance (cm) 0 11/11/22 1115   Wound Assessment Dry;Pink/red;Slough 11/11/22 1115   Drainage Amount Small 11/11/22 1115   Drainage Description Serosanguinous 11/11/22 1115   Odor None 11/11/22 1115   Dina-wound Assessment Intact 11/11/22 1115   Margins Defined edges 11/11/22 1115   Wound Thickness Description not for Pressure Injury Full thickness 11/11/22 1115   Number of days: 115       Wound 11/11/22 Ankle Right;Medial (Active)   Wound Image   11/11/22 1115   Wound Etiology Other 11/11/22 1115   Dressing Status New dressing applied 11/11/22 1115   Wound Cleansed Cleansed with saline 11/11/22 1115   Dressing/Treatment ABD;Hydrofiber Ag;Roll gauze 11/11/22 1115   Wound Length (cm) 1.5 cm 11/11/22 1115   Wound Width (cm) 2.5 cm 11/11/22 1115   Wound Depth (cm) 0.1 cm 11/11/22 1115   Wound Surface Area (cm^2) 3.75 cm^2 11/11/22 1115   Wound Volume (cm^3) 0.375 cm^3 11/11/22 1115   Distance Tunneling (cm) 0 cm 11/11/22 1115   Tunneling Position ___ O'Clock 0 11/11/22 1115   Undermining Starts ___ O'Clock 0 11/11/22 1115   Undermining Ends___ O'Clock 0 11/11/22 1115   Undermining Maxium Distance (cm) 0 11/11/22 1115   Wound Assessment Pink/red;Slough 11/11/22 1115   Drainage Amount Moderate 11/11/22 1115   Drainage Description Serous 11/11/22 1115   Odor None 11/11/22 1115   Dina-wound Assessment Dry/flaky 11/11/22 1115   Margins Defined edges 11/11/22 1115   Wound Thickness Description not for Pressure Injury Full thickness 11/11/22 1115   Number of days: 0       Wound 11/11/22 Toe (Comment  which one) Anterior;Right great toe amputation site (Active)   Wound Image   11/11/22 1115   Wound Etiology Non-Healing Surgical 11/11/22 1115   Dressing Status New dressing applied 11/11/22 1115   Wound Cleansed Cleansed with saline 11/11/22 1115   Dressing/Treatment ABD;Hydrofiber Ag;Roll gauze 11/11/22 1115   Wound Length (cm) 1 cm 11/11/22 1115   Wound Width (cm) 5 cm 11/11/22 1115   Wound Depth (cm) 1 cm 11/11/22 1115   Wound Surface Area (cm^2) 5 cm^2 11/11/22 1115   Wound Volume (cm^3) 5 cm^3 11/11/22 1115   Distance Tunneling (cm) 0 cm 11/11/22 1115   Tunneling Position ___ O'Clock 0 11/11/22 1115   Undermining Starts ___ O'Clock 0 11/11/22 1115   Undermining Ends___ O'Clock 0 11/11/22 1115   Undermining Maxium Distance (cm) 0 11/11/22 1115   Wound Assessment Pink/red 11/11/22 1115   Drainage Amount Moderate 11/11/22 1115   Drainage Description Serosanguinous 11/11/22 1115   Odor None 11/11/22 1115   Dina-wound Assessment Dry/flaky 11/11/22 1115   Margins Defined edges 11/11/22 1115   Wound Thickness Description not for Pressure Injury Full thickness 11/11/22 1115   Number of days: 0       Response to treatment:  Well tolerated by patient. Pain Assessment:  Severity:  none  Quality of pain: na  Wound Pain Timing/Severity: na  Premedicated: no    Plan:     Plan of Care: Wound 11/11/22 Ankle Right;Medial-Dressing/Treatment: ABD, Hydrofiber Ag, Roll gauze  Wound 11/11/22 Toe (Comment  which one) Anterior;Right great toe amputation site-Dressing/Treatment: ABD, Hydrofiber Ag, Roll gauze  Wound 07/19/22 Toe (Comment  which one) Anterior; Left #2 GR-Dressing/Treatment: ABD, Moist to dry, Roll gauze  Wound 07/19/22 Toe (Comment  which one) Anterior; Left #3 2ND-Dressing/Treatment: ABD, Moist to dry, Roll gauze    Pt in bed. Agreeable to wound assessment. Seen pt last inpatient stay on 10/19/22. Did not follow up with Dr. Margy Haywood or outpatient wound clinic. Right great toe amputation per Dr. Margy Haywood on 10/20/22. Diabetic/non healing surgical wounds to left great/2nd toe. With slough. Recommend Santyl and NS moist to dry dressing. Left medial ankle with diabetic/lymphedema wound as well. Aquacel Ag recommended. Right great toe amp site with dehisence. Sutures still present. Recommend Dr. Margy Haywood evaluate. Aquacel AG applied and wrapped. Scrotum edematous. Sacrum and heels intact. Bilateral lower legs/feet dry and scaly. Washed with bath oil. Atmos air pump applied to mattress. Pt is a mild risk for skin breakdown AEB Pablo. Follow Pablo orders. Updated nurse.      Specialty Bed Required : yes  [] Low Air Loss   [x] Pressure Redistribution  [] Fluid Immersion  [] Bariatric  [] Total Pressure Relief  [] Other:     Discharge Plan:  Placement for patient upon discharge: tbd  Hospice Care: no  Patient appropriate for Outpatient [address]: yes-has been referred but has not went    Patient/Caregiver Teaching:  Level of patient/caregiver understanding able to:   Voiced understanding.         Electronically signed by Ghazal Ward RN, Jovany Tang on 11/11/2022 at 1:55 PM

## 2022-11-11 NOTE — ED NOTES
ED TO INPATIENT SBAR HANDOFF    Patient Name: Skylar Arriaga   :  1950  67 y.o. MRN:  [id number]  Preferred Name    ED Room #:  ED20/ED-20  Family/Caregiver Present no   Restraints no   Sitter no   Sepsis Risk Score Sepsis Risk Score: 1.21    Situation  Code Status: Full Code No additional code details. Allergies: Pcn [penicillins] and Fentanyl  Weight:   Patient Vitals for the past 96 hrs (Last 3 readings):   Weight   22 0414 280 lb (127 kg)     Arrived from: home  Chief Complaint:   Chief Complaint   Patient presents with    Leg Pain    Post-op Problem     Right foot     Hospital Problem/Diagnosis:  Principal Problem:    Acute on chronic diastolic heart failure ([facility] [address]  Resolved Problems:    * No resolved hospital problems. *    Imaging:   XR CHEST PORTABLE   Preliminary Result   1. Cardiomegaly with mild-to-moderate congestive heart failure.            Abnormal labs:   Abnormal Labs Reviewed   CBC WITH AUTO DIFFERENTIAL - Abnormal; Notable for the following components:       Result Value    RBC 3.50 (*)     Hemoglobin 9.3 (*)     Hematocrit 31.4 (*)     MCH 26.6 (*)     MCHC 29.6 (*)     RDW 17.5 (*)     Segs Relative 92.9 (*)     Lymphocytes % 4.7 (*)     All other components within normal limits   COMPREHENSIVE METABOLIC PANEL W/ REFLEX TO MG FOR LOW K - Abnormal; Notable for the following components:    Potassium 5.5 (*)     BUN 28 (*)     Creatinine 1.7 (*)     Est, Glom Filt Rate 42 (*)     Glucose 193 (*)     Albumin 3.3 (*)     All other components within normal limits   BRAIN NATRIURETIC PEPTIDE - Abnormal; Notable for the following components:    Pro-BNP 9,525 (*)     All other components within normal limits   TROPONIN - Abnormal; Notable for the following components:    Troponin T 0.063 (*)     All other components within normal limits   LACTIC ACID - Abnormal; Notable for the following components:    Lactate 2.5 (*)     All other components within normal limits   POCT GLUCOSE - Abnormal; Notable for the following components:    POC Glucose 181 (*)     All other components within normal limits     Critical values: yes     Abnormal Assessment Findings:     Background  History:   Past Medical History:   Diagnosis Date    Acid reflux     Acute MI ([address] 2004, 2008    Arthritis     Back    Broken teeth     Upper Front    CAD (coronary artery disease)     Sees Dr. Brad Cheung [facility])     per old chart    CHF (congestive heart failure) ([address]     Chronic back pain     Chronic kidney disease     STAGE 3 KIDNEY FAILURE- \"from my diabetes- do not follow with any one- have seen Dr Amy Green in the past\"    Diabetes mellitus ([address] Dx 1965    per old chart pt has been diabetic since age 13    Diabetic neuropathy ([facility] [address]     \"in my feet\"    H/O cardiovascular stress test 08/25/2016    H/O Doppler ultrasound 09/27/2018    Moderate disease of the right lower extremity with an JALEN of 0.72. Moderate to severe disease of the left lower extremity with an JALEN of 0.55.     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    Neuropathy     both feet    neuropathy     PAD (peripheral artery disease) ([address] 09/27/2018    PVD (peripheral vascular disease) ([address]     Sick sinus syndrome (HCC)     Sleep apnea     \"sleep study 3 yrs ago- could not tolerate the cpap made me too dry\"    Spinal stenosis     Teeth missing     Upper And Lower    Type 2 diabetes mellitus without complication ([address]     WD-Chronic foot ulcer, left, with necrosis of bone ([address] 11/12/2021       Assessment    Vitals/MEWS: MEWS Score: 0  Level of Consciousness: Alert (0)   Vitals: 11/11/22 0900 11/11/22 0930 11/11/22 0945 11/11/22 1000   BP: (!) 146/66 136/71  135/67   Pulse: 71 71 67 70   Resp: 17 12 12 11   Temp:    97.4 °F (36.3 °C)   TempSrc:    Oral   SpO2: 99% 100% 96% 97%   Weight:         FiO2 (%): high humidity nasal cannula  O2 Flow Rate: O2 Device: Nasal cannula    Cardiac Rhythm:    Pain Assessment: 0 [x] Verbal [] Jenene Alken Scale  Pain Scale: Pain Assessment  Pain Level: 9  Patient's Stated Pain Goal: 0 - No pain  Pain Location: Foot, Toe (Comment which one)  Pain Orientation: Right, Left  Last documented pain score (0-10 scale) Pain Level: 9  Last documented pain medication administered:   Mental Status: oriented  NIH Score:    C-SSRS: Risk of Suicide: No Risk  Bedside swallow:    Romulus Coma Scale (GCS): Active LDA's:   Peripheral IV 11/11/22 Left Antecubital (Active)     PO Status: Regular  Pertinent or High Risk Medications/Drips: no   If Yes, please provide details:   Pending Blood Product Administration: no     You may also review the ED PT Care Timeline found under the Summary Nursing Index tab. Recommendation    Pending orders Admit Orders  Plan for Discharge (if known):    Additional Comments: toe amp 3wks ago, sleep apnea wears cpap at home   If any further questions, please call Sending RN at [phone]    Electronically signed by: Electronically signed by Jamaica Mares RN on 11/11/2022 at 10:30 AM     Arina Arizmendi RN  11/11/22 [address], [address] [facility]  11/11/22 [address] [facility]  11/11/22 [address], RN  11/11/22 1030

## 2022-11-11 NOTE — CONSULTS
CARDIOLOGY CONSULT NOTE   Reason for consultation:  CHF    Referring physician:  Gm Stanton MD     Primary care physician: Rose Munoz      Dear  Dr. Gm Stanton MD   Thanks for the consult. Chief Complaints :  Chief Complaint   Patient presents with    Leg Pain    Post-op Problem     Right foot        History of present illness:Anmol is a 67 y. o.year old who presents with days of bilateral lower extremity swelling extreme shortness of breath scrotal swelling ongoing for last few days he was actually discharged 15 days ago after prolonged hospitalization due to heart failure and bilateral lower extremity swelling he also underwent intervention of right superficial femoral artery but he has bilateral lower extremity nonhealing ulcers he is complaining of severe pain and swelling of the office both feet  Echo shows preserved EF but he has severe tricuspid regurgitation with RVSP of 80 mmHg and dilated IVC concerning for right heart failure creatinine was 1.7  He has history of coronary artery disease s/p multiple stents cardiac cath in 2018 showed patent stents stress test in 2020 showed no ischemia chronically elevated troponin level      Past medical history:    has a past medical history of Acid reflux, Acute MI ([address] Arthritis, Broken teeth, CAD (coronary artery disease), Cardiomyopathy ([address] CHF (congestive heart failure) ([address] Chronic back pain, Chronic kidney disease, Diabetes mellitus ([address] Diabetic neuropathy ([address] H/O cardiovascular stress test, H/O Doppler ultrasound, H/O percutaneous left heart catheterization, History of irregular heartbeat, History of syncope, Hyperlipidemia, Hypertension, Leg swelling, Necrotic toes (HCC), Neuropathy, neuropathy, PAD (peripheral artery disease) ([address] PVD (peripheral vascular disease) ([address] Sick sinus syndrome ([address] Sleep apnea, Spinal stenosis, Teeth missing, Type 2 diabetes mellitus without complication ([address] and WD-Chronic foot ulcer, left, with necrosis of bone ([facility] [address] Past surgical history:   has a past surgical history that includes Coronary angioplasty with stent; Dental surgery; Colonoscopy (08/04/2016); pacemaker placement (06/04/2010); vascular surgery; colectomy (Right, 08/26/2016); Toe amputation (Right, 09/12/2017); Toe amputation (Right, 01/09/2018); Cardiac catheterization; Cardiac defibrillator placement (06/04/2010); Coronary angioplasty; Cardiac catheterization (07/14/2017); Cardiac catheterization (11/20/2018); Toe amputation (Left, 12/26/2020); IR TUNNELED CVC PLACE WO SQ PORT/PUMP > 5 YEARS (6/14/2021); Toe amputation (Left, 7/26/2022); and Toe amputation (Right, 10/20/2022). Social History:   reports that he quit smoking about 12 months ago. His smoking use included cigars and cigarettes. He started smoking about 42 years ago. He has a 9.00 pack-year smoking history. He has never used smokeless tobacco. He reports current drug use. Drug: Marijuana Garon Burrows). He reports that he does not drink alcohol.   Family history:   no family history of CAD, STROKE of DM at early age    Allergies   Allergen Reactions    Pcn [Penicillins] Hives    Fentanyl Itching       furosemide (LASIX) injection 80 mg, BID  albuterol sulfate HFA (PROVENTIL;VENTOLIN;PROAIR) 108 (90 Base) MCG/ACT inhaler 2 puff, Q4H PRN  aspirin chewable tablet 81 mg, Daily  atorvastatin (LIPITOR) tablet 40 mg, Nightly  carvedilol (COREG) tablet 3.125 mg, BID WC  clopidogrel (PLAVIX) tablet 75 mg, Daily  isosorbide mononitrate (IMDUR) extended release tablet 30 mg, Daily  magnesium oxide (MAG-OX) tablet 400 mg, BID  oxyCODONE-acetaminophen (PERCOCET) 5-325 MG per tablet 1 tablet, BID PRN  tiotropium (SPIRIVA RESPIMAT) 2.5 MCG/ACT inhaler 2 puff, Daily  sodium chloride flush 0.9 % injection 5-40 mL, 2 times per day  sodium chloride flush 0.9 % injection 5-40 mL, PRN  0.9 % sodium chloride infusion, PRN  [START ON 11/12/2022] enoxaparin Sodium (LOVENOX) injection 30 mg, BID  ondansetron (ZOFRAN-ODT) disintegrating tablet 4 mg, Q8H PRN   Or  ondansetron (ZOFRAN) injection 4 mg, Q6H PRN  polyethylene glycol (GLYCOLAX) packet 17 g, Daily PRN  acetaminophen (TYLENOL) tablet 650 mg, Q6H PRN   Or  acetaminophen (TYLENOL) suppository 650 mg, Q6H PRN  glucose chewable tablet 16 g, PRN  dextrose bolus 10% 125 mL, PRN   Or  dextrose bolus 10% 250 mL, PRN  glucagon (rDNA) injection 1 mg, PRN  dextrose 10 % infusion, Continuous PRN  insulin lispro (HUMALOG) injection vial 0-4 Units, TID WC  insulin lispro (HUMALOG) injection vial 0-4 Units, Nightly  sodium zirconium cyclosilicate (LOKELMA) oral suspension 10 g, BID      Current Facility-Administered Medications   Medication Dose Route Frequency Provider Last Rate Last Admin    furosemide (LASIX) injection 80 mg  80 mg IntraVENous BID Jovan Gao MD   80 mg at 11/11/22 0843    albuterol sulfate HFA (PROVENTIL;VENTOLIN;PROAIR) 108 (90 Base) MCG/ACT inhaler 2 puff  2 puff Inhalation Q4H PRN Jovan Gao MD        aspirin chewable tablet 81 mg  81 mg Oral Daily Jovan Gao MD   81 mg at 11/11/22 0842    atorvastatin (LIPITOR) tablet 40 mg  40 mg Oral Nightly Jovan Gao MD        carvedilol (COREG) tablet 3.125 mg  3.125 mg Oral BID  Jovan Gao MD   3.125 mg at 11/11/22 1034    clopidogrel (PLAVIX) tablet 75 mg  75 mg Oral Daily Jovan Gao MD   75 mg at 11/11/22 0842    isosorbide mononitrate (IMDUR) extended release tablet 30 mg  30 mg Oral Daily Jovan Gao MD   30 mg at 11/11/22 0842    magnesium oxide (MAG-OX) tablet 400 mg  400 mg Oral BID Jovan Gao MD   400 mg at 11/11/22 0842    oxyCODONE-acetaminophen (PERCOCET) 5-325 MG per tablet 1 tablet  1 tablet Oral BID PRN Jovan Gao MD   1 tablet at 11/11/22 0848    tiotropium (SPIRIVA RESPIMAT) 2.5 MCG/ACT inhaler 2 puff  2 puff Inhalation Daily Jovan Gao MD   2 puff at 11/11/22 1037    sodium chloride flush 0.9 % injection 5-40 mL  5-40 mL IntraVENous 2 times per day Bert Ray MD   10 mL at 11/11/22 0859    sodium chloride flush 0.9 % injection 5-40 mL  5-40 mL IntraVENous PRN Bert Ray MD        0.9 % sodium chloride infusion   IntraVENous PRN Bert Ray MD        Kristofer Gamez ON 11/12/2022] enoxaparin Sodium (LOVENOX) injection 30 mg  30 mg SubCUTAneous BID Bert Ray MD        ondansetron (ZOFRAN-ODT) disintegrating tablet 4 mg  4 mg Oral Q8H PRN Bert Ray MD        Or    ondansetron (ZOFRAN) injection 4 mg  4 mg IntraVENous Q6H PRN Bert Ray MD        polyethylene glycol (GLYCOLAX) packet 17 g  17 g Oral Daily PRN Bert Ray MD        acetaminophen (TYLENOL) tablet 650 mg  650 mg Oral Q6H PRN Bert Ray MD        Or    acetaminophen (TYLENOL) suppository 650 mg  650 mg Rectal Q6H PRN Bert Ray MD        glucose chewable tablet 16 g  4 tablet Oral PRN Bert Ray MD        dextrose bolus 10% 125 mL  125 mL IntraVENous PRN Bert Ray MD        Or    dextrose bolus 10% 250 mL  250 mL IntraVENous PRN Bert Ray MD        glucagon (rDNA) injection 1 mg  1 mg SubCUTAneous PRN Bert Ray MD        dextrose 10 % infusion   IntraVENous Continuous PRN Bert Ray MD        insulin lispro (HUMALOG) injection vial 0-4 Units  0-4 Units SubCUTAneous TID WC Bert Ray MD        insulin lispro (HUMALOG) injection vial 0-4 Units  0-4 Units SubCUTAneous Nightly Bert Ray MD        sodium zirconium cyclosilicate (LOKELMA) oral suspension 10 g  10 g Oral BID Vishal Noonan MD   10 g at 11/11/22 4228     Review of Systems:   Constitutional: No Fever or Weight Loss   Eyes: No Decreased Vision  ENT: No Headaches, Hearing Loss or Vertigo  Cardiovascular: As per HPI  Respiratory: As per HPI  Gastrointestinal: No abdominal pain, appetite loss, blood in stools, constipation, diarrhea or heartburn  Genitourinary: No dysuria, trouble voiding, or hematuria  Musculoskeletal:  No gait disturbance, weakness or joint complaints  Integumentary: No rash or pruritis  Neurological: No TIA or stroke symptoms  Psychiatric: No anxiety or depression  Endocrine: No malaise, fatigue or temperature intolerance  Hematologic/Lymphatic: No bleeding problems, blood clots or swollen lymph nodes  Allergic/Immunologic: No nasal congestion or hives  All systems negative except as marked. Physical Examination:    Vitals:    11/11/22 1000 11/11/22 1034 11/11/22 1039 11/11/22 1100   BP: 135/67 128/87  (!) 137/57   Pulse: 70 74  65   Resp: 11 19   Temp: 97.4 °F (36.3 °C)   97.3 °F (36.3 °C)   TempSrc: Oral   Oral   SpO2: 97%  97% 100%   Weight:    273 lb 5.9 oz (124 kg)   Height:    6' (1.829 m)       General Appearance:  No distress, conversant    Constitutional:  Well developed, Well nourished, No acute distress, Non-toxic appearance. HENT:  Normocephalic, Atraumatic, Bilateral external ears normal, Oropharynx moist, No oral exudates, Nose normal. Neck- Normal range of motion, No tenderness, Supple, No stridor,no apical-carotid delay  Lymphatics : no palpable lymph nodes  Eyes:  PERRL, EOMI, Conjunctiva normal, No discharge. Respiratory:  Normal breath sounds, No respiratory distress, No wheezing, No chest tenderness. ,no use of accessory muscles, crackles Present  Cardiovascular: (PMI) apex non displaced,no lifts no thrills, ankle swelling Present , 1+, s1 and s2 audible,Murmur. Present, JVD not noted    Abdomen /GI:  Bowel sounds normal, Soft, No tenderness, No masses, No gross visceromegaly   :  No costovertebral angle tenderness   Musculoskeletal:  No edema, no tenderness, no deformities.  Back- no tenderness  Integument:  Well hydrated, no rash   Lymphatic:  No lymphadenopathy noted   Neurologic:  Alert & oriented x 3, CN 2-12 normal, normal motor function, normal sensory function, no focal deficits noted           Medical decision making and Data review:    Lab Review   Recent Labs     11/11/22 0050   WBC 9.2   HGB 9.3*   HCT 31.4*         Recent Labs     11/11/22  0050      K 5.5*      CO2 23   BUN 28*   CREATININE 1.7*     Recent Labs     11/11/22  0050   AST 15   ALT 12   BILITOT 0.7   ALKPHOS 121     Recent Labs     11/11/22  0050   TROPONINT 0.063*       Recent Labs     11/11/22 0050   PROBNP 9,525*     Lab Results   Component Value Date    INR 1.03 08/30/2021    PROTIME 13.3 08/30/2021       EKG: (reviewed by myself)    ECHO:(reviewed by myself)    Chest Xray:(reviewed by myself)  XR CHEST PORTABLE    Result Date: 11/11/2022  EXAMINATION: ONE XRAY VIEW OF THE CHEST 11/11/2022 12:26 am COMPARISON: October 16, 2022 HISTORY: ORDERING SYSTEM PROVIDED HISTORY: hypoxia TECHNOLOGIST PROVIDED HISTORY: Reason for exam:->hypoxia Reason for Exam: hypoxia FINDINGS: Left chest wall AICD in place. Heart is enlarged. Mild-to-moderate congestive heart failure is seen with trace right effusion. No pneumothorax. No acute or aggressive osseous lesion. 1. Cardiomegaly with mild-to-moderate congestive heart failure. XR CHEST PORTABLE    Result Date: 10/16/2022  EXAMINATION: ONE XRAY VIEW OF THE CHEST 10/16/2022 11:06 am COMPARISON: 10/11/2022 HISTORY: ORDERING SYSTEM PROVIDED HISTORY: Follow-up on pulmonary edema TECHNOLOGIST PROVIDED HISTORY: Reason for exam:->Follow-up on pulmonary edema Reason for Exam: Follow-up on pulmonary edema FINDINGS: Transvenous pacer remains in place. Cardiomegaly. Pulmonary vascular congestion, pulmonary edema, right pleural effusion. Findings suggest congestive heart failure     VL DUP LOWER EXTREMITY ARTERIES BILATERAL    Result Date: 10/14/2022  EXAMINATION: ARTERIAL DUPLEX ULTRASOUND OF THE BILATERAL LOWER EXTREMITIES  10/13/2022 8:49 pm TECHNIQUE: Duplex ultrasound using B-mode/gray scaled imaging, Doppler spectral analysis and color flow Doppler was obtained of the bilateral lower extremities. COMPARISON: None. HISTORY: ORDERING SYSTEM PROVIDED HISTORY: PVD/ non healing ulcers TECHNOLOGIST PROVIDED HISTORY: Reason for exam:->PVD/ non healing ulcers FINDINGS: Exam was extremely difficult due to lack of cooperation with the patient and extreme skin thickness and hardening in the bilateral calves. There is edema in the bilateral lower extremities. There is atherosclerosis in the visualized arteries and there is an enlarged left groin lymph node measuring 1.4 x 2.4 x 2.7 cm in size. These are not significantly changed when compared to prior CT abdomen pelvis from 01/24/2022. There are grossly triphasic waveforms in the right leg from the common femoral artery to the popliteal artery and there are monophasic waveforms below the knee in the anterior tibial and posterior tibial arteries. There are grossly triphasic waveforms in the left common femoral and superficial femoral arteries. There are grossly monophasic waveforms in the popliteal artery and proximal anterior tibial and peroneal arteries which are not visualized distally. Flow velocities were measured as follows: Com. Fem. 175 cm/sec Prof.           143 cm/sec SFA Prox. 179 cm/sec SFA Mid.     105 cm/sec SFA Dist.     73 cm/sec Pop.           72 cm/sec PTA           68 cm/sec Peron. 25 cm/sec TE           not visualized cm/sec Left: Flow velocities were measured as follows: Com. Fem. 61 cm/sec Prof.           118 cm/sec SFA Prox. 70 cm/sec SFA Mid.     49 cm/sec SFA Dist.     75 cm/sec Pop.           33 cm/sec PTA           41 proximally cm/sec Peron. 41 proximally cm/sec TE           not visualized cm/sec     Abnormal waveforms in the left leg as described without visualization of the peroneal artery or distal anterior posterior tibial arteries suspicious for severe peripheral vascular disease. Conventional arteriography is recommended for further evaluation.  Tardus parvus waveforms in the right leg below the knee in the anterior and posterior tibial arteries with nonvisualization of the peroneal artery. All labs, medications and tests reviewed by myself including data  from outside source , patient and available family . Continue all other medications of all above medical condition listed as is. Impression:  Principal Problem:    Acute on chronic diastolic heart failure ([address]  Resolved Problems:    * No resolved hospital problems. *      Assessment: 67 y. o.year old with PMH of  has a past medical history of Acid reflux, Acute MI ([address] Arthritis, Broken teeth, CAD (coronary artery disease), Cardiomyopathy ([address] CHF (congestive heart failure) ([address] Chronic back pain, Chronic kidney disease, Diabetes mellitus ([address] Diabetic neuropathy ([address] H/O cardiovascular stress test, H/O Doppler ultrasound, H/O percutaneous left heart catheterization, History of irregular heartbeat, History of syncope, Hyperlipidemia, Hypertension, Leg swelling, Necrotic toes (HCC), Neuropathy, neuropathy, PAD (peripheral artery disease) ([address] PVD (peripheral vascular disease) ([address] Sick sinus syndrome ([address] Sleep apnea, Spinal stenosis, Teeth missing, Type 2 diabetes mellitus without complication ([address] and WD-Chronic foot ulcer, left, with necrosis of bone ([address] Plan and Recommendations:    Bilateral lower extremity swelling concerning for right heart failure with severe tricuspid regurgitation bilateral lower extremity nonhealing stasis ulcers  CHF: Acute Systolic/ Diastolic decompensated heart failure. Appears to be volume overloaded . Agree with diuresis. We can try IV Lasix 80 mg TID. Depending on response we can titrate diuretics accordingly.    ASCVD: Chronically elevated troponin level cardiac cath in 2018 showed patent stents stress test in 2020 showed no ischemia  CHF with preserved EF ,noncompliant  Start milrinone drip to improve right heart function  Peripheral vascular disease s/p PCI continue antiplatelet aspirin and Plavix  He is not on ACE and ARB due to CKD  DVT prophylaxis if no contraindication  6. Dyslipidemia: continue statins           Thank you  much for consult and giving us the opportunity in contributing in the care of this patient. Please feel free to call me for any questions.        Iqra Russell MD, 11/11/2022 12:47 PM

## 2022-11-11 NOTE — ED NOTES
Pt is boarder, I requested an admit bed from upstairs, they will bring it down shortly     Fritz Peralta, Count includes the [facility]  11/11/22 4116

## 2022-11-11 NOTE — PLAN OF CARE
Problem: Discharge Planning  Goal: Discharge to home or other facility with appropriate resources  Outcome: Progressing  Flowsheets (Taken 11/11/2022 1155 by Khalida Fitzpatrick RN)  Discharge to home or other facility with appropriate resources:   Identify discharge learning needs (meds, wound care, etc)   Arrange for needed discharge resources and transportation as appropriate     Problem: Safety - Adult  Goal: Free from fall injury  Outcome: Progressing     Problem: ABCDS Injury Assessment  Goal: Absence of physical injury  Outcome: Progressing     Problem: Skin/Tissue Integrity  Goal: Absence of new skin breakdown  Description: 1. Monitor for areas of redness and/or skin breakdown  2. Assess vascular access sites hourly  3. Every 4-6 hours minimum:  Change oxygen saturation probe site  4. Every 4-6 hours:  If on nasal continuous positive airway pressure, respiratory therapy assess nares and determine need for appliance change or resting period.   Outcome: Progressing     Problem: Chronic Conditions and Co-morbidities  Goal: Patient's chronic conditions and co-morbidity symptoms are monitored and maintained or improved  Outcome: Progressing

## 2022-11-11 NOTE — ED NOTES
Medication History  [facility]    Patient Name: Cruz Vazquez 1950     Medication history has been completed by: Emma Wade CPhT    Source(s) of information: Insurance claims, retail pharmacy     Primary Care Physician: [facility]: [facility]    Allergies as of 11/11/2022 - Fully Reviewed 11/11/2022   Allergen Reaction Noted    Pcn [penicillins] Hives     Fentanyl Itching 07/06/2017        Prior to Admission medications    Medication Sig Start Date End Date Taking? Authorizing Provider   aspirin 81 MG chewable tablet Take 1 tablet by mouth daily 10/25/22   Lopez Barbosa MD   isosorbide mononitrate (IMDUR) 30 MG extended release tablet Take 1 tablet by mouth daily 10/25/22   Lopez Barbosa MD   hydrALAZINE (APRESOLINE) 25 MG tablet Take 1 tablet by mouth every 8 hours 10/24/22   Lopez Barbosa MD   magnesium oxide (MAG-OX) 400 (240 Mg) MG tablet Take 1 tablet by mouth 2 times daily 10/24/22   Lopez Barbosa MD   oxyCODONE-acetaminophen (PERCOCET) 5-325 MG per tablet Take 1 tablet by mouth 2 times daily as needed for Pain. Historical Provider, MD   torsemide (DEMADEX) 20 MG tablet Take 2 tablets by mouth daily 2/3/22 7/21/22  Jessica Dalton MD   gabapentin (NEURONTIN) 300 MG capsule TAKE 1 CAPSULE BY MOUTH 3 TIMES DAILY FOR 90 DAYS.  11/15/21 7/21/22  Eric Pacheco PA-C   SPIRIVA HANDIHALER 18 MCG inhalation capsule  6/18/21   Historical Provider, MD   atorvastatin (LIPITOR) 40 MG tablet Take 1 tablet by mouth nightly 12/13/20   Alexa Oseguera MD   carvedilol (COREG) 3.125 MG tablet Take 1 tablet by mouth 2 times daily 12/13/20   Alexa Oseguera MD   albuterol sulfate HFA (VENTOLIN HFA) 108 (90 Base) MCG/ACT inhaler Inhale 2 puffs into the lungs every 4 hours as needed for Wheezing 8/14/20   Sin Ferrer MD   clopidogrel (PLAVIX) 75 MG tablet Take 1 tablet by mouth daily 7/18/17   Selene Jenkins MD     Medications requiring reconciliation with provider:   Atorvastatin last fill date 03/17/22 (ordered)   Carvedilol For 3.125 mg last fill date 03/2020, for 25 mg BID last fill date 02/27/22 (ordered)  Clopidogrel last fill date 01/17/22 (ordered)  Spiriva last fill date 09/14/2021 (ordered)    Comments:  Unable to assess patient. Reached out to family with message left to return call. Reached out to retail pharmacy for verification of last fill dates.   Per last discharge note 10/24/22 these medications were discontinued:  Amlodipine  Diclofenac gel  Humalog   Lisinopril  Metaxalone  Metolazone     To my knowledge the above medication history is accurate as of 11/11/2022 10:27 AM.   Cynthia Rendon CPhT   11/11/2022 10:27 AM

## 2022-11-11 NOTE — ED PROVIDER NOTES
Triage Chief Complaint:   Leg Pain and Post-op Problem (Right foot)    [place]:  Sriram Castillo is a 67 y.o. male that presents via EMS with concern for postoperative wound. States that he accidentally bumped his right foot at home and started to bleed through his dressings. Patient recently admitted to the hospital and had right great toe amputation and 2 toes amputated of his left foot. Also had angiogram with angioplasty of left SFA. States that he has had worsening bilateral foot pain over the last few days but is also concerned about increasing nonproductive cough, chills at home. He usually uses 3 L of nasal cannula at all times and had to be placed on 6 L by EMS and is currently on a nonrebreather. States that he does have some shortness of breath. Also states that he has had increasing swelling in his legs and scrotum over the last few days. Denies any nausea, vomiting, diarrhea. ROS:  At least 10 systems reviewed and otherwise acutely negative except as in the [address] Past Medical History:   Diagnosis Date    Acid reflux     Acute MI ([address] 2004, 2008    Arthritis     Back    Broken teeth     Upper Front    CAD (coronary artery disease)     Sees Dr. Kenrick Valencia [facility])     per old chart    CHF (congestive heart failure) ([address]     Chronic back pain     Chronic kidney disease     STAGE 3 KIDNEY FAILURE- \"from my diabetes- do not follow with any one- have seen Dr Cate Kirkland in the past\"    Diabetes mellitus [facility]) Dx 1965    per old chart pt has been diabetic since age 13    Diabetic neuropathy [facility]     \"in my feet\"    H/O cardiovascular stress test 08/25/2016    H/O Doppler ultrasound 09/27/2018    Moderate disease of the right lower extremity with an JALEN of 0.72. Moderate to severe disease of the left lower extremity with an JALEN of 0.55.     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    Neuropathy     both feet    neuropathy     PAD (peripheral artery disease) ([address] 09/27/2018    PVD (peripheral vascular disease) ([address]     Sick sinus syndrome ([address]     Sleep apnea     \"sleep study 3 yrs ago- could not tolerate the cpap made me too dry\"    Spinal stenosis     Teeth missing     Upper And Lower    Type 2 diabetes mellitus without complication ([address]     WD-Chronic foot ulcer, left, with necrosis of bone ([address] 11/12/2021     Past Surgical History:   Procedure Laterality Date    CARDIAC CATHETERIZATION      per old chart done 10/2014    CARDIAC CATHETERIZATION  07/14/2017    with angiography of leg    CARDIAC CATHETERIZATION  11/20/2018    PATENT STENTS OF ALL THREE MAJOR VESSELS    CARDIAC DEFIBRILLATOR PLACEMENT  06/04/2010    [employer] [address] DR Defibrillator Implanted    COLECTOMY Right 08/26/2016    laparascopic; robotic assisted    COLONOSCOPY  08/04/2016    CORONARY ANGIOPLASTY      \"15 Heart Stents\"    CORONARY ANGIOPLASTY WITH STENT PLACEMENT      per old chart had angio with stent to circumflex and obtuse marginal artery at [facility] 5/2010( old chart also gives hx of stent placement done 2000,2004 and 2005)    DENTAL SURGERY      Teeth Extracted In Past    IR TUNNELED CATHETER PLACEMENT GREATER THAN 5 YEARS  6/14/2021    IR TUNNELED CATHETER PLACEMENT GREATER THAN 5 YEARS 6/14/2021 [facility]    PACEMAKER PLACEMENT  06/04/2010    [employer] [address]ura DR Defibrillator Implanted    TOE AMPUTATION Right 09/12/2017    Rt 3rd toe    TOE AMPUTATION Right 01/09/2018     Right 5th toe amputation and Toenails trimmed left 2,3,4 and 5th toes    TOE AMPUTATION Left 12/26/2020    LEFT GREAT TOE AMPUTATION performed by Xochitl Steen MD at [address] Left 2022    LEFT SECOND TOE AMPUTATION performed by oSuth Cobian MD at [address] Right 10/20/2022    Right First TOE AMPUTATION performed by [place] Cobian MD at [address] [place] [address]      per old chart had balloon angioplasty right superfical femoral artery,right popliteal artery,,right ant.tibial artery, right tibioperoneal trunk, and right post.tibial artery wna stent placement right popliteal artery and superfical femoral artery 2012     Family History   Problem Relation Age of Onset    Diabetes Mother     Stroke Mother     High Blood Pressure Mother     Vision Loss Mother     Cancer Father         Prostate Cancer    Diabetes Sister     Neuropathy Sister     Other Sister         \"Breathing Problems\"    Heart Disease Sister     Early Death Sister 62        Heart Complications    Cancer Brother         \"Stomach Cancer\"    High Blood Pressure Brother     Diabetes Brother     Heart Disease Brother     High Blood Pressure Brother     Cancer Son         \"Testicle Cancer\"     Social History     Socioeconomic History    Marital status:       Spouse name: Not on file    Number of children: Not on file    Years of education: Not on file    Highest education level: Not on file   Occupational History    Not on file   Tobacco Use    Smoking status: Former     Packs/day: 0.25     Years: 36.00     Pack years: 9.00     Types: Cigars, Cigarettes     Start date: 1980     Quit date: 10/22/2021     Years since quittin.0    Smokeless tobacco: Never    Tobacco comments:     Client states he has stopped smoking   Vaping Use    Vaping Use: Never used   Substance and Sexual Activity    Alcohol use: No    Drug use: Yes     Types: Marijuana Kristen Cotton)    Sexual activity: Never   Other Topics Concern    Not on file   Social History Narrative    Not on file     Social Determinants of Health     Financial Resource Strain: Not on file   Food Insecurity: Not on file Transportation Needs: Not on file   Physical Activity: Not on file   Stress: Not on file   Social Connections: Not on file   Intimate Partner Violence: Not on file   Housing Stability: Not on file     Current Facility-Administered Medications   Medication Dose Route Frequency Provider Last Rate Last Admin    cefTRIAXone (ROCEPHIN) 1,000 mg in dextrose 5 % 50 mL IVPB mini-bag  1,000 mg IntraVENous Once Mathew Camacho  mL/hr at 11/11/22 0258 1,000 mg at 11/11/22 0258    vancomycin (VANCOCIN) 2,000 mg in dextrose 5 % 500 mL IVPB  2,000 mg IntraVENous Once Mathew Camacho MD         Current Outpatient Medications   Medication Sig Dispense Refill    aspirin 81 MG chewable tablet Take 1 tablet by mouth daily 30 tablet 3    isosorbide mononitrate (IMDUR) 30 MG extended release tablet Take 1 tablet by mouth daily 30 tablet 3    hydrALAZINE (APRESOLINE) 25 MG tablet Take 1 tablet by mouth every 8 hours 90 tablet 3    magnesium oxide (MAG-OX) 400 (240 Mg) MG tablet Take 1 tablet by mouth 2 times daily 30 tablet 0    oxyCODONE-acetaminophen (PERCOCET) 5-325 MG per tablet Take 1 tablet by mouth 2 times daily as needed for Pain.      torsemide (DEMADEX) 20 MG tablet Take 2 tablets by mouth daily 60 tablet 0    gabapentin (NEURONTIN) 300 MG capsule TAKE 1 CAPSULE BY MOUTH 3 TIMES DAILY FOR 90 DAYS.  90 capsule 3    SPIRIVA HANDIHALER 18 MCG inhalation capsule       atorvastatin (LIPITOR) 40 MG tablet Take 1 tablet by mouth nightly 30 tablet 3    carvedilol (COREG) 3.125 MG tablet Take 1 tablet by mouth 2 times daily 60 tablet 3    albuterol sulfate HFA (VENTOLIN HFA) 108 (90 Base) MCG/ACT inhaler Inhale 2 puffs into the lungs every 4 hours as needed for Wheezing 1 Inhaler 3    clopidogrel (PLAVIX) 75 MG tablet Take 1 tablet by mouth daily 30 tablet 11     Allergies   Allergen Reactions    Pcn [Penicillins] Hives    Fentanyl Itching       Nursing Notes Reviewed    Physical Exam:  ED Triage Vitals [11/11/22 0013]   Enc Vitals Group BP (!) 184/104      Heart Rate (!) 119      Resp 22      Temp 100.1 °F (37.8 °C)      Temp Source Axillary      SpO2 96 %      Weight       Height       Head Circumference       Peak Flow       Pain Score       Pain Loc       Pain Edu? Excl. in [address]? GENERAL APPEARANCE: Awake and alert. Cooperative. No acute distress. HEAD: Normocephalic. Atraumatic. EYES: EOM's grossly intact. Sclera anicteric. ENT: Mucous membranes are moist. Tolerates saliva. No trismus. NECK: Supple. No meningismus. Trachea midline. HEART: Tachycardic. Radial pulses 2+. LUNGS: Respirations mildly labored with crackles bilaterally  ABDOMEN: Soft. Non-tender. No guarding or rebound. EXTREMITIES: No acute deformities. Chronic lymphedema and lichenification of bilateral lower extremities. Amputated toe sites without surrounding erythema or purulent drainage. No crepitus. Feet are warm  SKIN: Warm and dry. NEUROLOGICAL: No gross facial drooping. Moves all 4 extremities spontaneously. PSYCHIATRIC: Normal mood.     I have reviewed and interpreted all of the currently available lab results from this visit (if applicable):  Results for orders placed or performed during the hospital encounter of 11/11/22   COVID-19, Rapid    Specimen: Nasopharyngeal   Result Value Ref Range    Source UNKNOWN     SARS-CoV-2, NAAT NOT DETECTED NOT DETECTED   Rapid Flu Swab    Specimen: Nasopharyngeal   Result Value Ref Range    Rapid Influenza A Ag NEGATIVE NEGATIVE    Rapid Influenza B Ag NEGATIVE NEGATIVE   CBC with Auto Differential   Result Value Ref Range    WBC 9.2 4.0 - 10.5 K/CU MM    RBC 3.50 (L) 4.6 - 6.2 M/CU MM    Hemoglobin 9.3 (L) 13.5 - 18.0 GM/DL    Hematocrit 31.4 (L) 42 - 52 %    MCV 89.7 78 - 100 FL    MCH 26.6 (L) 27 - 31 PG    MCHC 29.6 (L) 32.0 - 36.0 %    RDW 17.5 (H) 11.7 - 14.9 %    Platelets 521 321 - 723 K/CU MM    MPV 9.8 7.5 - 11.1 FL    Differential Type AUTOMATED DIFFERENTIAL     Segs Relative 92.9 (H) 36 - 66 % Lymphocytes % 4.7 (L) 24 - 44 %    Monocytes % 1.6 0 - 4 %    Eosinophils % 0.3 0 - 3 %    Basophils % 0.3 0 - 1 %    Segs Absolute 8.6 K/CU MM    Lymphocytes Absolute 0.4 K/CU MM    Monocytes Absolute 0.2 K/CU MM    Eosinophils Absolute 0.0 K/CU MM    Basophils Absolute 0.0 K/CU MM    Nucleated RBC % 0.0 %    Total Nucleated RBC 0.0 K/CU MM    Total Immature Neutrophil 0.02 K/CU MM    Immature Neutrophil % 0.2 0 - 0.43 %   Comprehensive Metabolic Panel w/ Reflex to MG   Result Value Ref Range    Sodium 140 135 - 145 MMOL/L    Potassium 5.5 (HH) 3.5 - 5.1 MMOL/L    Chloride 106 99 - 110 mMol/L    CO2 23 21 - 32 MMOL/L    BUN 28 (H) 6 - 23 MG/DL    Creatinine 1.7 (H) 0.9 - 1.3 MG/DL    Est, Glom Filt Rate 42 (L) >60 mL/min/1.73m2    Glucose 193 (H) 70 - 99 MG/DL    Calcium 8.8 8.3 - 10.6 MG/DL    Albumin 3.3 (L) 3.4 - 5.0 GM/DL    Total Protein 7.5 6.4 - 8.2 GM/DL    Total Bilirubin 0.7 0.0 - 1.0 MG/DL    ALT 12 10 - 40 U/L    AST 15 15 - 37 IU/L    Alkaline Phosphatase 121 40 - 129 IU/L    Anion Gap 11 4 - 16   Brain Natriuretic Peptide   Result Value Ref Range    Pro-BNP 9,525 (H) <300 PG/ML   Troponin   Result Value Ref Range    Troponin T 0.063 (H) <0.01 NG/ML   Lactic Acid   Result Value Ref Range    Lactate 2.5 (HH) 0.4 - 2.0 mMOL/L   EKG 12 Lead   Result Value Ref Range    Ventricular Rate 96 BPM    Atrial Rate 96 BPM    P-R Interval 210 ms    QRS Duration 96 ms    Q-T Interval 346 ms    QTc Calculation (Bazett) 437 ms    P Axis 79 degrees    R Axis 63 degrees    T Axis 121 degrees    Diagnosis       Sinus rhythm with 1st degree AV block  Nonspecific ST and T wave abnormality  Abnormal ECG  When compared with ECG of 11-OCT-2022 23:29,  Sinus rhythm has replaced Electronic atrial pacemaker  Nonspecific T wave abnormality no longer evident in Anterior leads        Radiographs (if obtained):  [] The following radiograph was interpreted by pradeepelf in the absence of a radiologist:  [x] Radiologist's Report Reviewed:    EKG (if obtained): (All EKG's are interpreted by myself in the absence of a cardiologist)  Sinus rhythm with normal axis. First-degree AV block. No specific ST or T wave changes. No ST elevation. QTc is normal.  No pathologic Q waves. MDM:  Plan of care is discussed thoroughly with the patient and family if present. If performed, all imaging and lab work also discussed with patient. All relevant prior results and chart reviewed if available. Patient presents as above. On arrival, the patient is febrile and tachycardic. He has had respiratory symptoms for a few days with increasing cough. Currently on nonrebreather. Blood cultures and lactate collected. He is currently on a nonrebreather but is not in significant distress. COVID and flu swabs are sent. Lower extremity wounds do not appear to be infected at this time. He does not have any significant bleeding from right great toe amputated site at this time. No evidence of cellulitis of the lower extremities. Feet are warm bilaterally. Patient has distal dopplerable pulses. Patient's viral swabs are negative. Chest x-ray most consistent with CHF. He is started on broad-spectrum antibiotics to cover for pneumonia given fever, new oxygen requirement. Supplemental oxygen able to be titrated to 4 L nasal cannula. Lactate is 2.5. He does not have a leukocytosis. Bolick work-up largely unremarkable. He has chronically elevated BNP and troponin. On reevaluation, vital signs have stabilized with normal heart rate. He is not hypotensive here in the emergency department. Plan to keep fluid neutral at this point given his history of CHF. Admitted to hospitalist for further management. Clinical Impression:  1. Acute on chronic congestive heart failure, unspecified heart failure type ([address]    2.  Pneumonia due to infectious organism, unspecified laterality, unspecified part of lung      (Please note that portions of this note may have been completed with a voice recognition program. Efforts were made to edit the dictations but occasionally words are mis-transcribed.)    MD Vania Hamm MD  11/11/22 5972

## 2022-11-11 NOTE — CONSULTS
Patient:   Sancho Ma    Date:  22  :  1950, 67 y.o. Nephrologist: Jozef Aguilar MD  Provider: Gennaro Ryan    Reason for Consult: Recent acute kidney injury with underlying CKD stage IIIb and fluid overload    Consult requested by : Dr Pj Diaz MD    Chief Complaint:   Leg pain, increasing edema and shortness of breath    HISTORY OF PRESENT ILLNESS/Brief hospital course  AND  brief background history    Mr. Romana Low, is an unfortunate 77-year-old male, who presented to the emergency department and evaluation symptoms. Apparently symptoms started a week or 2 ago and continued to worsen. He also had elevated bleeding in one of his leg wound. On arrival in the emergency department, he had fever with T-max of 100.1, blood pressure recorded rather high 180s over 104, but his oxygen saturation was 96% by pulse oximetry while breathing ambient air. He underwent chest x-ray and biochemical testing which showed cardiomegaly and vascular congestion, biochemical testing showed potassium 5.5, BUN/creatinine 28 and 1.7 respectively. Other pertinent abnormal lab include albumin of 3.3, very high proBNP level more than 9500, low hemoglobin of 9.3 but normal white count, and platelet count. Appropriate bodily fluid culture were obtained, empirical antibiotic were initiated. Also Lasix 80 mg IV twice a day started. When I saw him in the emergency department, he is alert awake and oriented without any obvious respiratory distress. But he had significant lower extremity, including thigh, penile edema. Also has crackles on auscultation. I am of course very familiar with him. I been knowing him  almost last 12 years or so. Briefly speaking he has progressive kidney disease with creatinine close to 2 which, due to underlying cardiomyopathy, cardiorenal syndrome, hypertension and atherosclerotic cardiovascular disease.   His biggest problem is fluid retention mainly right heart failure pattern. Also has significant lower extremity peripheral arterial disease-causing chronic soft tissue, skin and bone infection. He has had several angiographic intervention. He was admitted to October with fluid overload, his hospital course was complicated by acute kidney injury mainly after appropriate diuresis, with peak creatinine of 3.8 mg/dL in October 24, 2021. He also had angioplasty and intervention for lower extremity arterial disease as well as amputation of toe. Although his diuretics was on hold for a brief period of time but looks like he was sent home with torsemide to 40 mg a day. According to Mr. Mcguire he was taking it     PAST MEDICAL HISTORY:  1.  CKD stage IIIA/B A3 with several acute kidney injuries, mainly by  creatinine criteria of course. 2.  Cardiorenal syndrome type 2 frequently transforming to type 1, but  has not been doing it since 12/2020. His diabetes mellitus is  longstanding, it was not very well controlled, had significant  proteinuria. 3.  Hypertension, also was not very well controlled. 4.  Coronary artery disease. He has had several stents long time ago by  Dr. Maggi Serrano. He also had an ICD placement. His EF was about 45% with most recent left ventricular ejection fraction is 55% on his October 12, 0078, diastolic dysfunction-severe dilated right ventricle with severe tricuspid regurgitation  unless there is any recent echocardiogram done. 5.  Severe atherosclerotic cardiovascular disease involving several  vascular beds, mainly the lower extremity too. He had angiogram  intervention and toe amputation as I mentioned earlier. He had angiogram and intervention and to amputation in October of this year  6. Chronic leg edema, which is pretty much minimal now. 7.  Probable autonomic dysfunction.   He did have some orthostatic  hypotension before, although it could be multifactorial.  8.  Type 2 diabetes mellitus with proteinuria     PAST SURGICAL HISTORY:  1. 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 2.  Angiogram several of them. 3.  Pacemaker and ICD placement. 4.  Coronary angiogram too, had intervention in the past.     FAMILY HISTORY:  Coronary artery disease runs in the family. Nobody has  advanced kidney disease or is on dialysis. SOCIAL HISTORY:  The patient is . He was born in [facility],  where he moved to [place] for a while and back here since, I  think, in 2016. He does have some extended family. He lives alone at  home. He does probably have some home healthcare. He does have an  electrical scooter. I am assuming that he has some social support, but  may be I am wrong, I need to double check with him. HABITS:  He does not smoke. No history of alcohol or illicit drug  abuse. REVIEW OF SYSTEMS:     All pertinent ROS neg except   Accidental fall, bleeding from leg, shortness of breath, weight gain and edema    Medication :     Reviewed, see in epic    BP (!) 143/56   Pulse 78   Temp 98.9 °F (37.2 °C)   Resp 14   Wt 280 lb (127 kg)   SpO2 97%   BMI 36.94 kg/m²     PHYSICAL EXAM:  General appearance: Alert, awake and oriented  HEENT: At least 2+ conjunctival pallor  Neck: Supple  Heart: Regular rate and rhythm, chest wall implanted cardiac device  LUNGS: Crackles bilaterally  Abdomen: Soft, abdominal wall edema which is rather diffuse  Extremities:  At least 3+ edema including penile edema both legs  He has some open wounds in both feet, only 2 toes left on each side    LABS:  Reviewed, see in epic            IMPRESSION:  Recent acute kidney injury and underlying CKD stage G3 B A3-his fluctuation of creatinine may be from cardiorenal syndrome  Hyperkalemia likely from renal vein congestion and low urine output  Fluid overload mainly right heart pattern-he is very sensitive to diuretics  Underlying soft tissue and skin infection with severe atherosclerotic cardiovascular disease  Secondary anemia and multiple other chronic disease along with low albumin    PLAN:    Urine urinary indicis and bladder scan  Recent Lasix 80 IV twice daily-we will see how he responded-usually he does respond only at the expense of increasing creatinine-it has been a challenge to keep him even close to normovolemia-add oral potassium binders for now-low-salt, DASH diet-I had long discussion with him before and now that at some point he probably need kidney replacement therapy-rule out any underlying infection and follow clinically and biochemically

## 2022-11-11 NOTE — ED TRIAGE NOTES
Patient brought in by EMS for leg pain and bleeding from surgical site on right foot. Per EMS patient bumped foot on something after having toes amputated. Dressing in place on foot.

## 2022-11-11 NOTE — PLAN OF CARE
77-year-old male admitted to the hospital for acute on chronic hypoxic respiratory failure in the setting of CHF exacerbation. Patient gained around 11 pounds since August.  Last echocardiogram showed EF of 55% with grade 2 diastolic dysfunction and severe tricuspid regurgitation. Currently on 6 L nasal cannula usually on 3 L at home. Chest x-ray was consistent with bilateral pulmonary congestions. Started on 80 mg of IV Lasix twice daily. Cardiology consult in place. Will await cardiology recommendations and go from there. Nephrology is also on board.

## 2022-11-11 NOTE — ED NOTES
Urinal at bedside, pt understands need sample, he will continue to try, unable to give at this time     Anmol Marr, [address] [facility]  11/11/22 0047

## 2022-11-11 NOTE — H&P
History and Physical      Name:  Neris Powell /Age/Sex: 1950  (67 y.o. male)   MRN & CSN:  [id number] & [id number] Encounter Date/Time: 2022 2:55 AM EST   Location:  ED20/ED-20 PCP: Shaye Garcia Day: 1    Assessment and Plan:     Patient is a 77-year-old male who presented after bumping right foot at home with bleeding. Had hypoxia in the ED. Hx is very limited as patient unable to provide details. # Acute on chronic hypoxemic respiratory failure likely secondary to acute on chronic diastolic heart failure  - Gained over x11 lbs since 2022 per chart review. - TTE in 10/2022 with LVEF of 50-55%, G2DD, severely dilated RV with severe TR.   - Clinical evidence of volume overload. Requiring 6L NC (usually on 3L). BNP 9525. CXR consistent with pulmonary congestion.   - Started on Lasix 80 mg BID.   - Cardiology consulted, follow-up. Recently required Milrinone. # NSTEMI, type II  - Denied any CP.  - Initial Tn mildly elevated. ECG without acute ischemic changes. - Follow-up repeat Tn. # PAD s/p PCI of left SFA on , s/p right great toe amputation on 10/20  - Continue DAP and Lipitor. # [id number]  - Nephrology consulted, recently had significant NANCY on CKD with diuresis. # Hyperkalemia  - Mild. ECG without acute changes. - Received Lasix. - Follow-up repeat labs. # Foot wounds  - Wound Care consulted, appreciate assistance. - See media section. # Elevated LA  - LA 2.5, no leukocytosis. Follow-up repeat. # Essential hypertension  - Continue home Imdur, Hydralazine and Coreg. # T2DM  - Last A1c 7.1% in 10/2022  - Not on home medications. - LCSI. Checklist:  Advanced directive: full  Catheter: none  DVT ppx: low-dose Lovenox  Electrolytes: corrected as above  Nutrition/Diet: cardiac    Disposition: patient requires continued admission due to acute hypoxemic respiratory failure. MDM: moderate.     History of Present Illness: Chief Complaint: foot bleeding    Patient is a 70-year-old male with a PMHx of HFpEF, PVD  who presented to the ED with bleeding from right foot. Had hypoxia in the ED. Hx is very limited as patient unable to provide details. ROS:     Ten point ROS reviewed negative, unless as noted above. Objective:     No intake or output data in the 24 hours ending 11/11/22 0255     Vitals:   Vitals:    11/11/22 0200 11/11/22 0230   BP: (!) 176/99 (!) 163/68   Pulse: 80 79   Resp: 14 12   Temp:     TempSrc:     SpO2:  94%     BMI: There is no height or weight on file to calculate BMI. General: Awake. AAOx3. HEENT: PERRLA. Hearing grossly intact. Oropharynx clear. Neck: Supple. JVD to angle of the jaw. CV: RRR. NL S1/S2. 2/6 SM at LLSB. CR <3 secs. 1+ peripheral edema. Pulm: Increased effort on 6L NC. Bibasilar rales. GI: +BS x4. Soft. NT/ND. : No CVA or suprapubic tenderness. No Fuller catheter. Skin: Intact. MSK: Bilateral toe amputations. Neuro: CNs grossly intact. Normal speech. No focal deficit. Psych: Pleasant. Past History: PMHx:   Past Medical History:   Diagnosis Date    Acid reflux     Acute MI ([address] 2004, 2008    Arthritis     Back    Broken teeth     Upper Front    CAD (coronary artery disease)     Sees Dr. Renan Hayes [facility])     per old chart    CHF (congestive heart failure) ([address]     Chronic back pain     Chronic kidney disease     STAGE 3 KIDNEY FAILURE- \"from my diabetes- do not follow with any one- have seen Dr Keila Puga in the past\"    Diabetes mellitus ([address] Dx 1965    per old chart pt has been diabetic since age 13    Diabetic neuropathy ([address]     \"in my feet\"    H/O cardiovascular stress test 08/25/2016    H/O Doppler ultrasound 09/27/2018    Moderate disease of the right lower extremity with an JALEN of 0.72.   Moderate to severe disease of the left lower extremity with an JALEN of 0.55.   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    Neuropathy     both feet    neuropathy     PAD (peripheral artery disease) ([address] 09/27/2018    PVD (peripheral vascular disease) ([address]     Sick sinus syndrome (HCC)     Sleep apnea     \"sleep study 3 yrs ago- could not tolerate the cpap made me too dry\"    Spinal stenosis     Teeth missing     Upper And Lower    Type 2 diabetes mellitus without complication ([address]     WD-Chronic foot ulcer, left, with necrosis of bone ([address] 11/12/2021       PSHx:   Past Surgical History:   Procedure Laterality Date    CARDIAC CATHETERIZATION      per old chart done 10/2014    CARDIAC CATHETERIZATION  07/14/2017    with angiography of leg    CARDIAC CATHETERIZATION  11/20/2018    PATENT STENTS OF ALL THREE MAJOR VESSELS    CARDIAC DEFIBRILLATOR PLACEMENT  06/04/2010    [employer] [address] Defibrillator Implanted    COLECTOMY Right 08/26/2016    laparascopic; robotic assisted    COLONOSCOPY  08/04/2016    CORONARY ANGIOPLASTY      \"15 Heart Stents\"    CORONARY ANGIOPLASTY WITH STENT PLACEMENT      per old chart had angio with stent to circumflex and obtuse marginal artery at [facility] 5/2010( old chart also gives hx of stent placement done 2000,2004 and 2005)    DENTAL SURGERY      Teeth Extracted In Past    IR TUNNELED CATHETER PLACEMENT GREATER THAN 5 YEARS  6/14/2021    IR TUNNELED CATHETER PLACEMENT GREATER THAN 5 YEARS 6/14/2021 [facility]    PACEMAKER PLACEMENT  06/04/2010    [employer] [address] Defibrillator Implanted    TOE AMPUTATION Right 09/12/2017    Rt 3rd toe    TOE AMPUTATION Right 01/09/2018     Right 5th toe amputation and Toenails trimmed left 2,3,4 and 5th toes    TOE AMPUTATION Left 2020    LEFT GREAT TOE AMPUTATION performed by Elvin Fisher MD at [facility] TOE AMPUTATION Left 2022    LEFT SECOND TOE AMPUTATION performed by Marv Valencia MD at [facility] TOE AMPUTATION Right 10/20/2022    Right First TOE AMPUTATION performed by Marv Valencia MD at [facility]      per old chart had balloon angioplasty right superfical femoral artery,right popliteal artery,,right ant.tibial artery, right tibioperoneal trunk, and right post.tibial artery wna stent placement right popliteal artery and superfical femoral artery 2012       Allergies: Allergies   Allergen Reactions    Pcn [Penicillins] Hives    Fentanyl Itching       FHx: family history includes Cancer in his brother, father, and son; Diabetes in his brother, mother, and sister; Early Death (age of onset: 62) in his sister; Heart Disease in his brother and sister; High Blood Pressure in his brother, brother, and mother; Neuropathy in his sister; Other in his sister; Stroke in his mother; Vision Loss in his mother. SHx:   Social History     Socioeconomic History    Marital status:    Tobacco Use    Smoking status: Former     Packs/day: 0.25     Years: 36.00     Pack years: 9.00     Types: Cigars, Cigarettes     Start date: 1980     Quit date: 10/22/2021     Years since quittin.0    Smokeless tobacco: Never    Tobacco comments:     Client states he has stopped smoking   Vaping Use    Vaping Use: Never used   Substance and Sexual Activity    Alcohol use: No    Drug use: Yes     Types: Marijuana Dietra Due)    Sexual activity: Never       Medications Prior to Admission     Prior to Admission medications    Medication Sig Start Date End Date Taking?  Authorizing Provider   aspirin 81 MG chewable tablet Take 1 tablet by mouth daily 10/25/22   Lupe Rodriguez MD   isosorbide mononitrate (IMDUR) 30 MG extended release tablet Take 1 tablet by mouth daily 10/25/22   Kayla Barker MD   hydrALAZINE (APRESOLINE) 25 MG tablet Take 1 tablet by mouth every 8 hours 10/24/22   Kayla Barker MD   magnesium oxide (MAG-OX) 400 (240 Mg) MG tablet Take 1 tablet by mouth 2 times daily 10/24/22   Kayla Barker MD   oxyCODONE-acetaminophen (PERCOCET) 5-325 MG per tablet Take 1 tablet by mouth 2 times daily as needed for Pain. Historical Provider, MD   torsemide (DEMADEX) 20 MG tablet Take 2 tablets by mouth daily 2/3/22 7/21/22  Cici Padilla MD   gabapentin (NEURONTIN) 300 MG capsule TAKE 1 CAPSULE BY MOUTH 3 TIMES DAILY FOR 90 DAYS.  11/15/21 7/21/22  Usman Valentin PA-C   SPIRIVA HANDIHALER 18 MCG inhalation capsule  6/18/21   Historical Provider, MD   atorvastatin (LIPITOR) 40 MG tablet Take 1 tablet by mouth nightly 12/13/20   Jus Levin MD   carvedilol (COREG) 3.125 MG tablet Take 1 tablet by mouth 2 times daily 12/13/20   Jus Levin MD   albuterol sulfate HFA (VENTOLIN HFA) 108 (90 Base) MCG/ACT inhaler Inhale 2 puffs into the lungs every 4 hours as needed for Wheezing 8/14/20   Corrine Noonan MD   clopidogrel (PLAVIX) 75 MG tablet Take 1 tablet by mouth daily 7/18/17   Paloma Matta MD       Data:     CBC:   Recent Labs     11/11/22  0050   WBC 9.2   HGB 9.3*      MCV 89.7   RDW 17.5*   LYMPHOPCT 4.7*   MONOPCT 1.6   BASOPCT 0.3   MONOSABS 0.2   LYMPHSABS 0.4   EOSABS 0.0   BASOSABS 0.0     CMP:    Recent Labs     11/11/22  0050      K 5.5*      CO2 23   BUN 28*   CREATININE 1.7*   GLUCOSE 193*   LABALBU 3.3*   CALCIUM 8.8   BILITOT 0.7   ALKPHOS 121   AST 15   ALT 12     Lipids:   Lab Results   Component Value Date/Time    CHOL 79 12/11/2020 07:00 AM    HDL 30 12/11/2020 07:00 AM    TRIG 61 12/11/2020 07:00 AM     Hemoglobin A1C:   Lab Results   Component Value Date/Time    LABA1C 7.1 10/12/2022 06:28 AM     TSH: No results found for: TSH  Troponin:   Lab Results   Component Value Date/Time    TROPONINT 0.063 11/11/2022 12:50 AM    TROPONINT 0.074 10/12/2022 01:21 PM    TROPONINT 0.056 10/11/2022 11:33 PM     BNP:   Recent Labs     11/11/22  0050   PROBNP 9,525*     Lactic Acid: No results for input(s): LACTA in the last 72 hours. UA:  Lab Results   Component Value Date/Time    NITRU NEGATIVE 10/13/2022 05:15 PM    COLORU YELLOW 10/13/2022 05:15 PM    PHUR 5.0 08/19/2016 09:38 AM    WBCUA <1 10/13/2022 05:15 PM    RBCUA NONE SEEN 10/13/2022 05:15 PM    MUCUS RARE 01/23/2022 02:25 PM    TRICHOMONAS NONE SEEN 10/13/2022 05:15 PM    BACTERIA NEGATIVE 10/13/2022 05:15 PM    CLARITYU CLEAR 10/13/2022 05:15 PM    SPECGRAV 1.015 10/13/2022 05:15 PM    LEUKOCYTESUR TRACE 10/13/2022 05:15 PM    UROBILINOGEN 0.2 10/13/2022 05:15 PM    BILIRUBINUR NEGATIVE 10/13/2022 05:15 PM    BLOODU NEGATIVE 10/13/2022 05:15 PM    KETUA NEGATIVE 10/13/2022 05:15 PM     Urine Cultures: No results found for: LABURIN  Blood Cultures: No results found for: BC  No results found for: BLOODCULT2  Organism: No results found for: [facility]    Radiology results:  XR CHEST PORTABLE   Preliminary Result   1. Cardiomegaly with mild-to-moderate congestive heart failure.              Medications:     Medications:    cefTRIAXone (ROCEPHIN) IV  1,000 mg IntraVENous Once    vancomycin  2,000 mg IntraVENous Once        Infusions:       PRN Meds:        Chris Foster MD  11/11/22 2:55 AM

## 2022-11-12 PROBLEM — L97.522 SKIN ULCER OF LEFT FOOT INCLUDING TOES WITH FAT LAYER EXPOSED (HCC): Status: ACTIVE | Noted: 2022-11-12

## 2022-11-12 PROBLEM — T81.41XA SUPERFICIAL INCISIONAL SURGICAL SITE INFECTION: Status: ACTIVE | Noted: 2022-11-12

## 2022-11-12 LAB
ALBUMIN SERPL-MCNC: 2.9 GM/DL (ref 3.4–5)
ALP BLD-CCNC: 75 IU/L (ref 40–129)
ALT SERPL-CCNC: 9 U/L (ref 10–40)
ANION GAP SERPL CALCULATED.3IONS-SCNC: 5 MMOL/L (ref 4–16)
AST SERPL-CCNC: 10 IU/L (ref 15–37)
BASOPHILS ABSOLUTE: 0 K/CU MM
BASOPHILS RELATIVE PERCENT: 0.7 % (ref 0–1)
BILIRUB SERPL-MCNC: 0.6 MG/DL (ref 0–1)
BUN BLDV-MCNC: 31 MG/DL (ref 6–23)
CALCIUM SERPL-MCNC: 8.4 MG/DL (ref 8.3–10.6)
CHLORIDE BLD-SCNC: 104 MMOL/L (ref 99–110)
CO2: 27 MMOL/L (ref 21–32)
CREAT SERPL-MCNC: 2.1 MG/DL (ref 0.9–1.3)
DIFFERENTIAL TYPE: ABNORMAL
EOSINOPHILS ABSOLUTE: 0.1 K/CU MM
EOSINOPHILS RELATIVE PERCENT: 1.7 % (ref 0–3)
GFR SERPL CREATININE-BSD FRML MDRD: 33 ML/MIN/1.73M2
GLUCOSE BLD-MCNC: 105 MG/DL (ref 70–99)
GLUCOSE BLD-MCNC: 113 MG/DL (ref 70–99)
GLUCOSE BLD-MCNC: 126 MG/DL (ref 70–99)
GLUCOSE BLD-MCNC: 133 MG/DL (ref 70–99)
GLUCOSE BLD-MCNC: 148 MG/DL (ref 70–99)
HCT VFR BLD CALC: 27.9 % (ref 42–52)
HEMOGLOBIN: 8.4 GM/DL (ref 13.5–18)
IMMATURE NEUTROPHIL %: 0.2 % (ref 0–0.43)
LYMPHOCYTES ABSOLUTE: 0.8 K/CU MM
LYMPHOCYTES RELATIVE PERCENT: 16.4 % (ref 24–44)
MAGNESIUM: 1.5 MG/DL (ref 1.8–2.4)
MCH RBC QN AUTO: 26.8 PG (ref 27–31)
MCHC RBC AUTO-ENTMCNC: 30.1 % (ref 32–36)
MCV RBC AUTO: 88.9 FL (ref 78–100)
MONOCYTES ABSOLUTE: 0.3 K/CU MM
MONOCYTES RELATIVE PERCENT: 6.8 % (ref 0–4)
NUCLEATED RBC %: 0 %
PDW BLD-RTO: 17.4 % (ref 11.7–14.9)
PHOSPHORUS: 3.5 MG/DL (ref 2.5–4.9)
PLATELET # BLD: 147 K/CU MM (ref 140–440)
PMV BLD AUTO: 9.3 FL (ref 7.5–11.1)
POTASSIUM SERPL-SCNC: 5.1 MMOL/L (ref 3.5–5.1)
RBC # BLD: 3.14 M/CU MM (ref 4.6–6.2)
SEGMENTED NEUTROPHILS ABSOLUTE COUNT: 3.4 K/CU MM
SEGMENTED NEUTROPHILS RELATIVE PERCENT: 74.2 % (ref 36–66)
SODIUM BLD-SCNC: 136 MMOL/L (ref 135–145)
TOTAL IMMATURE NEUTOROPHIL: 0.01 K/CU MM
TOTAL NUCLEATED RBC: 0 K/CU MM
TOTAL PROTEIN: 6.1 GM/DL (ref 6.4–8.2)
WBC # BLD: 4.6 K/CU MM (ref 4–10.5)

## 2022-11-12 PROCEDURE — 94664 DEMO&/EVAL PT USE INHALER: CPT

## 2022-11-12 PROCEDURE — 83735 ASSAY OF MAGNESIUM: CPT

## 2022-11-12 PROCEDURE — 6370000000 HC RX 637 (ALT 250 FOR IP): Performed by: INTERNAL MEDICINE

## 2022-11-12 PROCEDURE — 6360000002 HC RX W HCPCS: Performed by: STUDENT IN AN ORGANIZED HEALTH CARE EDUCATION/TRAINING PROGRAM

## 2022-11-12 PROCEDURE — 6370000000 HC RX 637 (ALT 250 FOR IP): Performed by: STUDENT IN AN ORGANIZED HEALTH CARE EDUCATION/TRAINING PROGRAM

## 2022-11-12 PROCEDURE — 84100 ASSAY OF PHOSPHORUS: CPT

## 2022-11-12 PROCEDURE — 99233 SBSQ HOSP IP/OBS HIGH 50: CPT | Performed by: INTERNAL MEDICINE

## 2022-11-12 PROCEDURE — 6360000002 HC RX W HCPCS: Performed by: INTERNAL MEDICINE

## 2022-11-12 PROCEDURE — 94761 N-INVAS EAR/PLS OXIMETRY MLT: CPT

## 2022-11-12 PROCEDURE — 82962 GLUCOSE BLOOD TEST: CPT

## 2022-11-12 PROCEDURE — 99221 1ST HOSP IP/OBS SF/LOW 40: CPT | Performed by: SURGERY

## 2022-11-12 PROCEDURE — 6370000000 HC RX 637 (ALT 250 FOR IP): Performed by: NURSE PRACTITIONER

## 2022-11-12 PROCEDURE — 36415 COLL VENOUS BLD VENIPUNCTURE: CPT

## 2022-11-12 PROCEDURE — 94640 AIRWAY INHALATION TREATMENT: CPT

## 2022-11-12 PROCEDURE — 2140000000 HC CCU INTERMEDIATE R&B

## 2022-11-12 PROCEDURE — 2580000003 HC RX 258: Performed by: STUDENT IN AN ORGANIZED HEALTH CARE EDUCATION/TRAINING PROGRAM

## 2022-11-12 PROCEDURE — 80053 COMPREHEN METABOLIC PANEL: CPT

## 2022-11-12 PROCEDURE — 85025 COMPLETE CBC W/AUTO DIFF WBC: CPT

## 2022-11-12 RX ORDER — OXYCODONE HYDROCHLORIDE AND ACETAMINOPHEN 5; 325 MG/1; MG/1
2 TABLET ORAL ONCE
Status: COMPLETED | OUTPATIENT
Start: 2022-11-12 | End: 2022-11-12

## 2022-11-12 RX ORDER — SODIUM HYPOCHLORITE 1.25 MG/ML
SOLUTION TOPICAL DAILY
Status: DISCONTINUED | OUTPATIENT
Start: 2022-11-12 | End: 2022-11-18 | Stop reason: HOSPADM

## 2022-11-12 RX ADMIN — ENOXAPARIN SODIUM 30 MG: 100 INJECTION SUBCUTANEOUS at 09:04

## 2022-11-12 RX ADMIN — MAGNESIUM OXIDE TAB 400 MG (241.3 MG ELEMENTAL MG) 400 MG: 400 (241.3 MG) TAB at 21:13

## 2022-11-12 RX ADMIN — SODIUM CHLORIDE, PRESERVATIVE FREE 10 ML: 5 INJECTION INTRAVENOUS at 08:58

## 2022-11-12 RX ADMIN — CARVEDILOL 3.12 MG: 6.25 TABLET, FILM COATED ORAL at 09:09

## 2022-11-12 RX ADMIN — COLLAGENASE SANTYL: 250 OINTMENT TOPICAL at 12:17

## 2022-11-12 RX ADMIN — CLOPIDOGREL BISULFATE 75 MG: 75 TABLET ORAL at 08:51

## 2022-11-12 RX ADMIN — MAGNESIUM OXIDE TAB 400 MG (241.3 MG ELEMENTAL MG) 400 MG: 400 (241.3 MG) TAB at 08:52

## 2022-11-12 RX ADMIN — SODIUM ZIRCONIUM CYCLOSILICATE 10 G: 10 POWDER, FOR SUSPENSION ORAL at 21:14

## 2022-11-12 RX ADMIN — FUROSEMIDE 80 MG: 10 INJECTION, SOLUTION INTRAMUSCULAR; INTRAVENOUS at 14:26

## 2022-11-12 RX ADMIN — OXYCODONE AND ACETAMINOPHEN 1 TABLET: 5; 325 TABLET ORAL at 12:14

## 2022-11-12 RX ADMIN — OXYCODONE HYDROCHLORIDE AND ACETAMINOPHEN 2 TABLET: 5; 325 TABLET ORAL at 05:53

## 2022-11-12 RX ADMIN — OXYCODONE AND ACETAMINOPHEN 2 TABLET: 5; 325 TABLET ORAL at 22:08

## 2022-11-12 RX ADMIN — ENOXAPARIN SODIUM 30 MG: 100 INJECTION SUBCUTANEOUS at 21:14

## 2022-11-12 RX ADMIN — CARVEDILOL 3.12 MG: 6.25 TABLET, FILM COATED ORAL at 16:20

## 2022-11-12 RX ADMIN — TIOTROPIUM BROMIDE INHALATION SPRAY 2 PUFF: 3.12 SPRAY, METERED RESPIRATORY (INHALATION) at 12:00

## 2022-11-12 RX ADMIN — ISOSORBIDE MONONITRATE 30 MG: 30 TABLET, EXTENDED RELEASE ORAL at 08:53

## 2022-11-12 RX ADMIN — ATORVASTATIN CALCIUM 40 MG: 40 TABLET, FILM COATED ORAL at 21:13

## 2022-11-12 RX ADMIN — ASPIRIN 81 MG CHEWABLE TABLET 81 MG: 81 TABLET CHEWABLE at 08:52

## 2022-11-12 RX ADMIN — SODIUM CHLORIDE, PRESERVATIVE FREE 10 ML: 5 INJECTION INTRAVENOUS at 21:10

## 2022-11-12 RX ADMIN — METOLAZONE 2.5 MG: 2.5 TABLET ORAL at 21:13

## 2022-11-12 RX ADMIN — FUROSEMIDE 80 MG: 10 INJECTION, SOLUTION INTRAMUSCULAR; INTRAVENOUS at 08:56

## 2022-11-12 RX ADMIN — FUROSEMIDE 80 MG: 10 INJECTION, SOLUTION INTRAMUSCULAR; INTRAVENOUS at 21:10

## 2022-11-12 RX ADMIN — METOLAZONE 2.5 MG: 2.5 TABLET ORAL at 08:52

## 2022-11-12 ASSESSMENT — ENCOUNTER SYMPTOMS
EYE DISCHARGE: 0
SINUS PRESSURE: 0
EYE REDNESS: 0
BACK PAIN: 1
CONSTIPATION: 0
COUGH: 0
VOICE CHANGE: 0
COLOR CHANGE: 0
NAUSEA: 0
SHORTNESS OF BREATH: 1
WHEEZING: 1
CHEST TIGHTNESS: 0
SORE THROAT: 0
EYE PAIN: 0
COUGH: 1
DIARRHEA: 0
ABDOMINAL DISTENTION: 0
ABDOMINAL PAIN: 0
BLOOD IN STOOL: 0
VOMITING: 0
SINUS PAIN: 0

## 2022-11-12 ASSESSMENT — PAIN DESCRIPTION - FREQUENCY
FREQUENCY: CONTINUOUS
FREQUENCY: CONTINUOUS

## 2022-11-12 ASSESSMENT — PAIN DESCRIPTION - LOCATION
LOCATION: FOOT
LOCATION: FOOT
LOCATION: LEG
LOCATION: LEG

## 2022-11-12 ASSESSMENT — PAIN SCALES - GENERAL
PAINLEVEL_OUTOF10: 10
PAINLEVEL_OUTOF10: 6
PAINLEVEL_OUTOF10: 8
PAINLEVEL_OUTOF10: 6
PAINLEVEL_OUTOF10: 6
PAINLEVEL_OUTOF10: 0
PAINLEVEL_OUTOF10: 8

## 2022-11-12 ASSESSMENT — PAIN DESCRIPTION - ONSET
ONSET: ON-GOING
ONSET: ON-GOING

## 2022-11-12 ASSESSMENT — PAIN - FUNCTIONAL ASSESSMENT: PAIN_FUNCTIONAL_ASSESSMENT: ACTIVITIES ARE NOT PREVENTED

## 2022-11-12 ASSESSMENT — PAIN DESCRIPTION - PAIN TYPE
TYPE: ACUTE PAIN;SURGICAL PAIN
TYPE: ACUTE PAIN

## 2022-11-12 ASSESSMENT — PAIN DESCRIPTION - DESCRIPTORS
DESCRIPTORS: ACHING
DESCRIPTORS: SHARP

## 2022-11-12 ASSESSMENT — PAIN DESCRIPTION - ORIENTATION: ORIENTATION: RIGHT;LEFT

## 2022-11-12 NOTE — CONSULTS
[id number] [facility] Physicians    PATIENT: Emma Trimble, 1950, 67 y.o., male  MRN: [id number]    Physician: Iva Kat MD    Date: 11/12/22    Reason for Evaluation & Chief Complaint:     Chief Complaint   Patient presents with    Leg Pain    Post-op Problem     Right foot       Requesting Provider:Agnes Dahl MD     History Obtained From:  patient, electronic medical record    HISTORY OF PRESENT ILLNESS:    Ruben Domingo is a 67 y.o. male presenting with acute on chronic CHF and foot wounds. He had amputation of the right first toe with Dr. Abilio Joseph on 10/20/22. He bumped his foot at home and came in by EMS with concern for bleeding of the foot. He also has CHF and has had worsening edema and pain in the legs, cough, and chills. He has noted some drainage from the surgical site and wounds on his left foot. The bleeding has stopped. He has chronic pain  in his legs which is worse from the edema. Past Medical History:    Past Medical History:   Diagnosis Date    Acid reflux     Acute MI ([address] 2004, 2008    Arthritis     Back    Broken teeth     Upper Front    CAD (coronary artery disease)     [facility])     per old chart    CHF (congestive heart failure) ([address]     Chronic back pain     Chronic kidney disease     STAGE 3 KIDNEY FAILURE- \"from my diabetes- do not follow with any one- have seen Dr Krystal Crespo in the past\"    Diabetes mellitus ([address] Dx 1965    per old chart pt has been diabetic since age 13    Diabetic neuropathy ([address]     \"in my feet\"    H/O cardiovascular stress test 08/25/2016    H/O Doppler ultrasound 09/27/2018    Moderate disease of the right lower extremity with an JALEN of 0.72. Moderate to severe disease of the left lower extremity with an JALEN of 0.55.     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    Neuropathy     both feet    neuropathy     PAD (peripheral artery disease) ([address] 09/27/2018    PVD (peripheral vascular disease) ([address]     Sick sinus syndrome ([address]     Sleep apnea     \"sleep study 3 yrs ago- could not tolerate the cpap made me too dry\"    Spinal stenosis     Teeth missing     Upper And Lower    Type 2 diabetes mellitus without complication ([address]     WD-Chronic foot ulcer, left, with necrosis of bone ([address] 11/12/2021       Past Surgical History:    Past Surgical History:   Procedure Laterality Date    CARDIAC CATHETERIZATION      per old chart done 10/2014    CARDIAC CATHETERIZATION  07/14/2017    with angiography of leg    CARDIAC CATHETERIZATION  11/20/2018    PATENT STENTS OF ALL THREE MAJOR VESSELS    CARDIAC DEFIBRILLATOR PLACEMENT  06/04/2010    [employer] Secura  Defibrillator Implanted    COLECTOMY Right 08/26/2016    laparascopic; robotic assisted    COLONOSCOPY  08/04/2016    CORONARY ANGIOPLASTY      \"15 Heart Stents\"    CORONARY ANGIOPLASTY WITH STENT PLACEMENT      per old chart had angio with stent to circumflex and obtuse marginal artery at [facility] 5/2010( old chart also gives hx of stent placement done 2000,2004 and 2005)    DENTAL SURGERY      Teeth Extracted In Past    IR TUNNELED CATHETER PLACEMENT GREATER THAN 5 YEARS  6/14/2021    IR TUNNELED CATHETER PLACEMENT GREATER THAN 5 YEARS 6/14/2021 [facility]    PACEMAKER PLACEMENT  06/04/2010    Medtronic Secura DR Defibrillator Implanted    TOE AMPUTATION Right 09/12/2017    Rt 3rd toe    TOE AMPUTATION Right 01/09/2018     Right 5th toe amputation and Toenails trimmed left 2,3,4 and 5th toes    TOE AMPUTATION Left 12/26/2020    LEFT GREAT TOE AMPUTATION performed by Winnie Saravia Jarrod Perez MD at [address] Left 7/26/2022    LEFT SECOND TOE AMPUTATION performed by Maggie Paez MD at [address] Right 10/20/2022    Right First TOE AMPUTATION performed by Maggie Paez MD at [address] [place] [address]      per old chart had balloon angioplasty right superfical femoral artery,right popliteal artery,,right ant.tibial artery, right tibioperoneal trunk, and right post.tibial artery wna stent placement right popliteal artery and superfical femoral artery 7/2012       Current Medications:   Current Facility-Administered Medications   Medication Dose Route Frequency Provider Last Rate Last Admin    sodium hypochlorite (DAKINS) 0.125 % external solution   Irrigation Daily Amparo Senior MD        albuterol sulfate HFA (PROVENTIL;VENTOLIN;PROAIR) 108 (90 Base) MCG/ACT inhaler 2 puff  2 puff Inhalation Q4H PRN Jamey Jacinto MD        aspirin chewable tablet 81 mg  81 mg Oral Daily Jamey Jacinto MD   81 mg at 11/12/22 0852    atorvastatin (LIPITOR) tablet 40 mg  40 mg Oral Nightly Jamey Jacinto MD   40 mg at 11/11/22 2143    carvedilol (COREG) tablet 3.125 mg  3.125 mg Oral BID WC Jamey Jacinto MD   3.125 mg at 11/12/22 1620    clopidogrel (PLAVIX) tablet 75 mg  75 mg Oral Daily Jamey Jacinto MD   75 mg at 11/12/22 0851    isosorbide mononitrate (IMDUR) extended release tablet 30 mg  30 mg Oral Daily Jamey Jacinto MD   30 mg at 11/12/22 0853    magnesium oxide (MAG-OX) tablet 400 mg  400 mg Oral BID Jamey Jacinto MD   400 mg at 11/12/22 0852    oxyCODONE-acetaminophen (PERCOCET) 5-325 MG per tablet 1 tablet  1 tablet Oral BID PRN Jamey Jacinto MD   1 tablet at 11/12/22 1214    tiotropium (SPIRIVA RESPIMAT) 2.5 MCG/ACT inhaler 2 puff  2 puff Inhalation Daily Jamey Jacinto MD   2 puff at 11/12/22 1200    sodium chloride flush 0.9 % injection 5-40 mL  5-40 mL IntraVENous 2 times per day Jamey Jacinto MD   10 mL at 11/12/22 0256 sodium chloride flush 0.9 % injection 5-40 mL  5-40 mL IntraVENous JAVIN Priscilla Urbina MD        0.9 % sodium chloride infusion   IntraVENous PRN Priscilla Urbina MD        enoxaparin Sodium (LOVENOX) injection 30 mg  30 mg SubCUTAneous BID Priscilla Urbina MD   30 mg at 11/12/22 0904    ondansetron (ZOFRAN-ODT) disintegrating tablet 4 mg  4 mg Oral Q8H PRN Priscilla Urbina MD        Or    ondansetron (ZOFRAN) injection 4 mg  4 mg IntraVENous Q6H PRN Priscilla Urbina MD        polyethylene glycol (GLYCOLAX) packet 17 g  17 g Oral Daily PRN Priscilla Urbina MD        acetaminophen (TYLENOL) tablet 650 mg  650 mg Oral Q6H PRN Priscilla Urbina MD        Or    acetaminophen (TYLENOL) suppository 650 mg  650 mg Rectal Q6H JAVIN Priscilla Urbian MD        glucose chewable tablet 16 g  4 tablet Oral PRN Priscilla Urbina MD        dextrose bolus 10% 125 mL  125 mL IntraVENous PRN Priscilla Urbina MD        Or    dextrose bolus 10% 250 mL  250 mL IntraVENous PRN Priscilla Urbina MD        glucagon (rDNA) injection 1 mg  1 mg SubCUTAneous PRN Priscilla Urbina MD        dextrose 10 % infusion   IntraVENous Continuous DERRICK Urbina MD        insulin lispro (HUMALOG) injection vial 0-4 Units  0-4 Units SubCUTAneous TID WC Priscilla Urbina MD        insulin lispro (HUMALOG) injection vial 0-4 Units  0-4 Units SubCUTAneous Nightly Priscilla Urbina MD        sodium zirconium cyclosilicate (LOKELMA) oral suspension 10 g  10 g Oral BID Best Puentes MD   10 g at 11/11/22 2143    metOLazone (ZAROXOLYN) tablet 2.5 mg  2.5 mg Oral BID Treva Sabillon MD   2.5 mg at 11/12/22 2880    furosemide (LASIX) injection 80 mg  80 mg IntraVENous TID Treva Sabillon MD   80 mg at 11/12/22 1426    collagenase ointment   Topical Daily Lefty Phipps MD   Given at 11/12/22 1217       Allergies:  Pcn [penicillins] and Fentanyl    Social History:   Social History     Socioeconomic History    Marital status:   Tobacco Use    Smoking status: Former     Packs/day: 0.25     Years: 36.00     Pack years: 9.00     Types: Cigars, Cigarettes     Start date: 1980     Quit date: 10/22/2021     Years since quittin.0    Smokeless tobacco: Never    Tobacco comments:     Client states he has stopped smoking   Vaping Use    Vaping Use: Never used   Substance and Sexual Activity    Alcohol use: No    Drug use: Yes     Types: Marijuana [place])    Sexual activity: Never       Family History:   Family History   Problem Relation Age of Onset    Diabetes Mother     Stroke Mother     High Blood Pressure Mother     Vision Loss Mother     Cancer Father         Prostate Cancer    Diabetes Sister     Neuropathy Sister     Other Sister         \"Breathing Problems\"    Heart Disease Sister     Early Death Sister 62        Heart Complications    Cancer Brother         \"Stomach Cancer\"    High Blood Pressure Brother     Diabetes Brother     Heart Disease Brother     High Blood Pressure Brother     Cancer Son         \"Testicle Cancer\"       REVIEW OF SYSTEMS:    Review of Systems   Constitutional:  Positive for chills. Negative for fever. HENT:  Negative for congestion and sore throat. Eyes:  Negative for discharge and redness. Respiratory:  Positive for cough and shortness of breath. Negative for chest tightness. Cardiovascular:  Negative for chest pain and palpitations. Gastrointestinal:  Negative for abdominal distention, abdominal pain, constipation, diarrhea, nausea and vomiting. Genitourinary:  Negative for dysuria and flank pain. Musculoskeletal:  Positive for arthralgias and myalgias. Skin:  Positive for wound. Negative for color change and rash. Neurological:  Negative for dizziness and numbness. Hematological:  Bruises/bleeds easily. Psychiatric/Behavioral:  Negative for confusion. The patient is not nervous/anxious.     I have reviewed the patient's information pertinent to this visit, including medical history, family history, social history and review of systems. PHYSICAL EXAM:    Vitals:    11/12/22 1214 11/12/22 1244 11/12/22 1430 11/12/22 1547   BP:   (!) 145/70 128/65   Pulse:   64    Resp: 16 16 16 12   Temp:    98.3 °F (36.8 °C)   TempSrc:    Oral   SpO2:    98%   Weight:       Height:         Physical Exam  Constitutional:       General: He is not in acute distress. Appearance: He is well-developed. He is obese. He is not diaphoretic. HENT:      Head: Normocephalic and atraumatic. Right Ear: External ear normal.      Left Ear: External ear normal.      Mouth/Throat:      Mouth: Mucous membranes are moist.   Eyes:      General:         Right eye: No discharge. Left eye: No discharge. Neck:      Trachea: No tracheal deviation. Cardiovascular:      Rate and Rhythm: Normal rate and regular rhythm. Pulmonary:      Effort: Pulmonary effort is normal. No respiratory distress. Breath sounds: No wheezing. Abdominal:      General: There is no distension. Palpations: Abdomen is soft. Tenderness: There is no abdominal tenderness. Musculoskeletal:         General: Tenderness (BLE) present. No deformity. Cervical back: Neck supple. Right lower leg: Edema present. Left lower leg: Edema present. Comments: BLE chronic appearing edema   Feet:      Comments: Right toes 1, 3, 5 surgically absent. Great toe incision site with dehiscence, seropurulent drainage. Left foot with multiple toe wounds with some fibrin present, serosanguinous drainage. Skin:     General: Skin is warm and dry. Findings: No rash. Neurological:      Mental Status: He is alert and oriented to person, place, and time.    Psychiatric:         Behavior: Behavior normal.       DATA:    Lab Results   Component Value Date    WBC 4.6 11/12/2022    HGB 8.4 (L) 11/12/2022    HCT 27.9 (L) 11/12/2022     11/12/2022     11/12/2022    K 5.1 11/12/2022     11/12/2022    CO2 27 11/12/2022    BUN 31 (H) 11/12/2022    CREATININE 2.1 (H) 11/12/2022    GLUCOSE 113 (H) 11/12/2022    CALCIUM 8.4 11/12/2022    PROT 6.1 (L) 11/12/2022    BILITOT 0.6 11/12/2022    AST 10 (L) 11/12/2022    ALT 9 (L) 11/12/2022    ALKPHOS 75 11/12/2022    AMYLASE 36 09/17/2012    LIPASE 15 03/11/2021    INR 1.03 08/30/2021    GLUF 264 (H) 11/17/2016    LABA1C 7.1 (H) 10/12/2022    LABMICR 33.10 (H) 08/19/2016       Imaging:   Pertinent laboratory and imaging studies were personally reviewed if available.     IMPRESSION:    Ioana Kline is a 67 y.o. male with acute on chronic CHF, bilateral lower extremity wound s/p right great toe amputation with Dr. Braxton Scott 10/20/22    Patient Active Problem List    Diagnosis Date Noted    Precordial pain 07/18/2018    Acute chest pain     Acute on chronic diastolic heart failure ([address] 11/11/2022    Acute on chronic diastolic CHF (congestive heart failure) ([address] 10/14/2022    Leg swelling 10/14/2022    Heart failure exacerbated by sotalol ([address] 10/12/2022    CHF (congestive heart failure), NYHA class I, acute on chronic, combined ([address] 10/12/2022    Cellulitis of foot 07/26/2022    Toe osteomyelitis ([address] 07/26/2022    Diabetic ulcer of right foot due to type 2 diabetes mellitus ([address] 07/25/2022    Acute on chronic HFrEF (heart failure with reduced ejection fraction) ([address] 01/22/2022    Scrotal edema 01/22/2022    Hypertensive urgency 01/22/2022    WD-Chronic foot ulcer, left, with necrosis of bone ([address] 11/12/2021    Acute on chronic respiratory failure with hypoxemia ([address] 10/29/2021    Receiving intravenous antibiotic treatment as outpatient 07/18/2021    Persistent wound pain     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     WD-Diabetic ulcer of toe of right foot associated with type 2 diabetes mellitus, with fat layer exposed ([facility] 06/18/2021    Diabetic ulcer of toe associated with type 2 diabetes mellitus, with bone involvement without evidence of necrosis ([facility] 06/18/2021    Long term (current) use of antibiotics 06/14/2021    Moderate malnutrition ([facility] 06/08/2021    Visit for wound check     Diabetic foot ulcer with osteomyelitis ([facility] 06/07/2021    Acute on chronic systolic CHF (congestive heart failure) ([address] 12/21/2020    Leg edema     Acute on chronic congestive heart failure ([address] 12/10/2020    Epigastric pain 08/12/2020    HTN (hypertension) 05/20/2019    Diabetic neuropathy ([facility] 05/02/2019    Chronic kidney disease (CKD) stage G3a/A3, moderately decreased glomerular filtration rate (GFR) between 45-59 mL/min/1.73 square meter and albuminuria creatinine ratio greater than 300 mg/g ([facility]) 01/29/2019    Cardiomyopathy ([facility] 01/29/2019    Unstable angina ([facility] 11/19/2018    Acute kidney injury ([facility] 02/09/2018    Fluid overload 02/09/2018    DM (diabetes mellitus) ([facility] 02/09/2018    Ischemia of toe     Hyperkalemia 01/09/2018    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[facility]     ICD (implantable cardioverter-defibrillator) battery depletion 10/11/2017    Chronic kidney disease (CKD) stage G3a/A2, moderately decreased glomerular filtration rate (GFR) between 45-59 mL/min/1.73 square meter and albuminuria creatinine ratio between  mg/g ([facility] 10/06/2017    Edema 10/06/2017    Diabetic foot infection ([facility]     Toe gangrene ([facility] 07/26/2017    Necrotic toes ([facility] 07/06/2017    WD-PVD (peripheral vascular disease) ([facility]     Limb ischemia     Microalbuminuria 01/22/2017    Tubulovillous adenoma of colon 10/20/2016    S/P partial colectomy 08/27/2016    Chronic coronary artery disease 05/31/2016    Chronic kidney disease, stage III (moderate) ([facility] 05/31/2016    Sick sinus syndrome ([facility] 05/31/2016    Type 2 diabetes mellitus with diabetic polyneuropathy ([address] 05/31/2016    Spinal stenosis of lumbar region 05/31/2016    Obesity, Class I, BMI 30-34.9 05/31/2016    Chronic combined systolic and diastolic heart failure ([facility] 03/03/2016    Biventricular ICD (implantable cardioverter-defibrillator) in place 09/03/2015 Mixed hyperlipidemia 04/13/2015    PAD (peripheral artery disease) ([facility] 07/24/2012     Visit Diagnoses:  1. Acute on chronic congestive heart failure, unspecified heart failure type ([facility]    2. Pneumonia due to infectious organism, unspecified laterality, unspecified part of lung      PLAN:  Discussed findings and options with Michelle Washington. Dehiscence of incision. Will remove sutures, pack with Dakins moistened gauze, wet to dry. Monitor progress. Will obtain cultures and start empiric abx due to purulent drainage.    Left foot ulcerations - mild fibrin but not grossly infected - Santyl with dressing changes  Thank you for the consultation and the opportunity to care for Michellequentin Luoofelia    Electronically signed by Giovanni Ann MD, 11/12/2022, 5:36 PM

## 2022-11-12 NOTE — PROGRESS NOTES
Wound care completed, dressings changed per order. Wound beds are red with slough, cleaned with NaCl, no drainage noted. Santyl applied and covered with aquacel AG, abd, and wrapped with kerlix. Complains of pain with dressing change. Patient received Percocet for pain prior to dressing change.

## 2022-11-12 NOTE — H&P
V2.0  [facility] [place]      Name:  Andrey Heaton /Age/Sex: 1950  (67 y.o. male)   MRN & CSN:  [id number] & [id number] Encounter Date/Time: 2022 8:40 AM EST    Location:  5702/0228-Y PCP: Dimitri Baird Day: 2    Assessment and Plan:   Andrey Heaton is a 67 y.o. male with pmh of PAD s/p PCI of left SFA, CKD stage IIIb, hyperkalemia, foot wounds, CHF who presents with Acute on chronic diastolic heart failure (HCC)      Plan:  Acute on chronic hypoxic respiratory failure in the setting of CHF exacerbation: Known case of diastolic heart failure with preserved ejection fraction. Chest x-ray showed signs of pulmonary vascular congestion. Started on IV Lasix 80 mg twice daily. Cardiology on board. On telemetry diuresing well. Goal dry weight of 260 pounds. Still has to lose 10 more pounds. Strict I's and O's  Troponinemia likely demand ischemia  PAD status post PCI of the left SFA on  s/p right carotid arm protection on 10/20 on DAPT and Lipitor  CKD stage IIIb nephrology consulted avoid nephrotoxic agents continue to monitor  Hypokalemia replete as needed   Foot wounds wound care consulted  Type B lactic acidosis  Benign essential hypertension  Non-insulin-dependent diabetes mellitus type 2 on sliding scale insulin: Patient is not on diabetic medications please consider starting diabetic medications for the patient on discharge last hemoglobin A1c was 7.1 in 2022. Diet ADULT DIET; Regular; 5 carb choices (75 gm/meal); Low Fat/Low Chol/High Fiber/CLAUDIA; Low Sodium (2 gm); 1000 ml   DVT Prophylaxis [x] Lovenox, []  Heparin, [] SCDs, [] Ambulation,  [] Eliquis, [] Xarelto  [] Coumadin   Code Status Full Code   Disposition From: Home Home  Expected Disposition: Home  Estimated Date of Discharge: 3 to 4 days  Patient requires continued admission due to IV diuresis still not at baseline.    Surrogate Decision Maker/ POA      Subjective:     Chief Complaint: Leg Pain and Post-op Problem (Right foot)     Patient was seen at the bedside. Patient still has to lose around 10 more pounds of weight retry weight goal.  Diuresing well. No acute events otherwise. Still has bilateral lower extremity swelling that is getting better slowly. Foot wounds are present wound care is applied. Review of Systems:    Review of Systems   Constitutional:  Positive for activity change, appetite change and unexpected weight change. Negative for chills and fever. HENT:  Negative for ear pain, hearing loss, sinus pressure, sinus pain and voice change. Eyes:  Negative for pain, discharge and visual disturbance. Respiratory:  Positive for shortness of breath and wheezing. Negative for cough and chest tightness. Cardiovascular:  Positive for leg swelling. Negative for chest pain and palpitations. Gastrointestinal:  Negative for abdominal pain, blood in stool, diarrhea, nausea and vomiting. Genitourinary:  Negative for dysuria and frequency. Musculoskeletal:  Positive for arthralgias and back pain. Neurological:  Positive for weakness. Negative for dizziness, light-headedness and headaches. Psychiatric/Behavioral:  Negative for sleep disturbance. Objective: Intake/Output Summary (Last 24 hours) at 11/12/2022 0840  Last data filed at 11/12/2022 1389  Gross per 24 hour   Intake 434.92 ml   Output 2025 ml   Net -1590.08 ml        Vitals:   Vitals:    11/12/22 0600   BP: 128/70   Pulse: 64   Resp: 12   Temp:    SpO2:        Physical Exam:   Physical Exam  Vitals reviewed. Constitutional:       Appearance: Normal appearance. He is normal weight. HENT:      Head: Normocephalic. Nose: Nose normal.      Mouth/Throat:      Mouth: Mucous membranes are dry. Eyes:      Conjunctiva/sclera: Conjunctivae normal.      Pupils: Pupils are equal, round, and reactive to light. Cardiovascular:      Rate and Rhythm: Normal rate and regular rhythm.       Pulses: Normal pulses. Heart sounds: Normal heart sounds. No murmur heard. Pulmonary:      Effort: Respiratory distress present. Breath sounds: Rales present. No wheezing or rhonchi. Abdominal:      General: Abdomen is flat. Bowel sounds are normal. There is distension. Palpations: Abdomen is soft. Tenderness: There is no abdominal tenderness. Musculoskeletal:         General: No deformity. Normal range of motion. Cervical back: Normal range of motion and neck supple. Right lower leg: Edema present. Left lower leg: Edema present. Skin:     General: Skin is dry. Coloration: Skin is not jaundiced or pale. Neurological:      General: No focal deficit present. Mental Status: He is alert and oriented to person, place, and time. Mental status is at baseline.           Medications:   Medications:    aspirin  81 mg Oral Daily    atorvastatin  40 mg Oral Nightly    carvedilol  3.125 mg Oral BID WC    clopidogrel  75 mg Oral Daily    isosorbide mononitrate  30 mg Oral Daily    magnesium oxide  400 mg Oral BID    tiotropium  2 puff Inhalation Daily    sodium chloride flush  5-40 mL IntraVENous 2 times per day    enoxaparin  30 mg SubCUTAneous BID    insulin lispro  0-4 Units SubCUTAneous TID WC    insulin lispro  0-4 Units SubCUTAneous Nightly    sodium zirconium cyclosilicate  10 g Oral BID    metOLazone  2.5 mg Oral BID    furosemide  80 mg IntraVENous TID    collagenase   Topical Daily      Infusions:    sodium chloride      dextrose      milrinone 0.0625 mcg/kg/min (11/12/22 0607)     PRN Meds: albuterol sulfate HFA, 2 puff, Q4H PRN  oxyCODONE-acetaminophen, 1 tablet, BID PRN  sodium chloride flush, 5-40 mL, PRN  sodium chloride, , PRN  ondansetron, 4 mg, Q8H PRN   Or  ondansetron, 4 mg, Q6H PRN  polyethylene glycol, 17 g, Daily PRN  acetaminophen, 650 mg, Q6H PRN   Or  acetaminophen, 650 mg, Q6H PRN  glucose, 4 tablet, PRN  dextrose bolus, 125 mL, PRN   Or  dextrose bolus, 250 mL, PRN  glucagon (rDNA), 1 mg, PRN  dextrose, , Continuous PRN        Labs      Recent Results (from the past 24 hour(s))   POCT Glucose    Collection Time: 11/11/22  9:23 AM   Result Value Ref Range    POC Glucose 181 (H) 70 - 99 MG/DL   Lactic Acid    Collection Time: 11/11/22  9:31 AM   Result Value Ref Range    Lactate 0.8 0.4 - 2.0 mMOL/L   POCT Glucose    Collection Time: 11/11/22 10:24 AM   Result Value Ref Range    POC Glucose 179 (H) 70 - 99 MG/DL   POCT Glucose    Collection Time: 11/11/22 12:24 PM   Result Value Ref Range    POC Glucose 158 (H) 70 - 99 MG/DL   Creatinine, urine, random    Collection Time: 11/11/22  2:14 PM   Result Value Ref Range    Creatinine, Ur 59.2 39 - 259 MG/DL   Urinalysis    Collection Time: 11/11/22  2:45 PM   Result Value Ref Range    Color, UA YELLOW YELLOW    Clarity, UA CLEAR CLEAR    Glucose, Urine NEGATIVE NEGATIVE MG/DL    Bilirubin Urine NEGATIVE NEGATIVE MG/DL    Ketones, Urine NEGATIVE NEGATIVE MG/DL    Specific Gravity, UA 1.020 1.001 - 1.035    Blood, Urine NEGATIVE NEGATIVE    pH, Urine 5.0 5.0 - 8.0    Protein, UA 30 (A) NEGATIVE MG/DL    Urobilinogen, Urine 0.2 0.2 - 1.0 MG/DL    Nitrite Urine, Quantitative NEGATIVE NEGATIVE    Leukocyte Esterase, Urine NEGATIVE NEGATIVE   Protein / creatinine ratio, urine    Collection Time: 11/11/22  2:45 PM   Result Value Ref Range    Urine Total Protein 58.7 (H) <12 MG/DL    Creatinine, Ur 57.4 39 - 259 MG/DL    Prot/Creat Ratio, Ur 1.0 (H) <0.2   Potassium, urine, random    Collection Time: 11/11/22  2:45 PM   Result Value Ref Range    Potassium, Ur 27.8 22 - 119 MMOL/L   Sodium, urine, random    Collection Time: 11/11/22  2:45 PM   Result Value Ref Range    Sodium, Ur 85 35 - 167 MMOL/L   Comprehensive Metabolic Panel    Collection Time: 11/11/22  2:45 PM   Result Value Ref Range    Sodium 138 135 - 145 MMOL/L    Potassium 5.2 (H) 3.5 - 5.1 MMOL/L    Chloride 104 99 - 110 mMol/L    CO2 28 21 - 32 MMOL/L    BUN 29 (H) 6 - 23 MG/DL    Creatinine 2.0 (H) 0.9 - 1.3 MG/DL    Est, Glom Filt Rate 35 (L) >60 mL/min/1.73m2    Glucose 137 (H) 70 - 99 MG/DL    Calcium 8.2 (L) 8.3 - 10.6 MG/DL    Albumin 3.0 (L) 3.4 - 5.0 GM/DL    Total Protein 6.3 (L) 6.4 - 8.2 GM/DL    Total Bilirubin 0.5 0.0 - 1.0 MG/DL    ALT 10 10 - 40 U/L    AST 11 (L) 15 - 37 IU/L    Alkaline Phosphatase 83 40 - 129 IU/L    Anion Gap 6 4 - 16   Microscopic Urinalysis    Collection Time: 11/11/22  2:45 PM   Result Value Ref Range    RBC, UA NONE SEEN 0 - 3 /HPF    WBC, UA <1 0 - 2 /HPF    Bacteria, UA NEGATIVE NEGATIVE /HPF    Squam Epithel, UA <1 /HPF    Mucus, UA RARE (A) NEGATIVE HPF    Trichomonas, UA NONE SEEN NONE SEEN /HPF    Hyaline Casts, UA 5 /LPF   POCT Glucose    Collection Time: 11/11/22  4:19 PM   Result Value Ref Range    POC Glucose 137 (H) 70 - 99 MG/DL   POCT Glucose    Collection Time: 11/11/22 10:08 PM   Result Value Ref Range    POC Glucose 169 (H) 70 - 99 MG/DL   CBC with Auto Differential    Collection Time: 11/12/22  7:26 AM   Result Value Ref Range    WBC 4.6 4.0 - 10.5 K/CU MM    RBC 3.14 (L) 4.6 - 6.2 M/CU MM    Hemoglobin 8.4 (L) 13.5 - 18.0 GM/DL    Hematocrit 27.9 (L) 42 - 52 %    MCV 88.9 78 - 100 FL    MCH 26.8 (L) 27 - 31 PG    MCHC 30.1 (L) 32.0 - 36.0 %    RDW 17.4 (H) 11.7 - 14.9 %    Platelets 430 150 - 184 K/CU MM    MPV 9.3 7.5 - 11.1 FL    Differential Type AUTOMATED DIFFERENTIAL     Segs Relative 74.2 (H) 36 - 66 %    Lymphocytes % 16.4 (L) 24 - 44 %    Monocytes % 6.8 (H) 0 - 4 %    Eosinophils % 1.7 0 - 3 %    Basophils % 0.7 0 - 1 %    Segs Absolute 3.4 K/CU MM    Lymphocytes Absolute 0.8 K/CU MM    Monocytes Absolute 0.3 K/CU MM    Eosinophils Absolute 0.1 K/CU MM    Basophils Absolute 0.0 K/CU MM    Nucleated RBC % 0.0 %    Total Nucleated RBC 0.0 K/CU MM    Total Immature Neutrophil 0.01 K/CU MM    Immature Neutrophil % 0.2 0 - 0.43 %   Comprehensive Metabolic Panel    Collection Time: 11/12/22  7:26 AM   Result Value Ref Range    Sodium 136 135 - 145 MMOL/L    Potassium 5.1 3.5 - 5.1 MMOL/L    Chloride 104 99 - 110 mMol/L    CO2 27 21 - 32 MMOL/L    BUN 31 (H) 6 - 23 MG/DL    Creatinine 2.1 (H) 0.9 - 1.3 MG/DL    Est, Glom Filt Rate 33 (L) >60 mL/min/1.73m2    Glucose 113 (H) 70 - 99 MG/DL    Calcium 8.4 8.3 - 10.6 MG/DL    Albumin 2.9 (L) 3.4 - 5.0 GM/DL    Total Protein 6.1 (L) 6.4 - 8.2 GM/DL    Total Bilirubin 0.6 0.0 - 1.0 MG/DL    ALT 9 (L) 10 - 40 U/L    AST 10 (L) 15 - 37 IU/L    Alkaline Phosphatase 75 40 - 129 IU/L    Anion Gap 5 4 - 16   Phosphorus    Collection Time: 11/12/22  7:26 AM   Result Value Ref Range    Phosphorus 3.5 2.5 - 4.9 MG/DL   Magnesium    Collection Time: 11/12/22  7:26 AM   Result Value Ref Range    Magnesium 1.5 (L) 1.8 - 2.4 mg/dl        Imaging/Diagnostics Last 24 Hours   XR CHEST PORTABLE    Result Date: 11/11/2022  EXAMINATION: ONE XRAY VIEW OF THE CHEST 11/11/2022 12:26 am COMPARISON: October 16, 2022 HISTORY: ORDERING SYSTEM PROVIDED HISTORY: hypoxia TECHNOLOGIST PROVIDED HISTORY: Reason for exam:->hypoxia Reason for Exam: hypoxia FINDINGS: Left chest wall AICD in place. Heart is enlarged. Mild-to-moderate congestive heart failure is seen with trace right effusion. No pneumothorax. No acute or aggressive osseous lesion. 1. Cardiomegaly with mild-to-moderate congestive heart failure.        Electronically signed by Юлия Villegas MD, MD on 11/12/2022 at 8:40 AM

## 2022-11-12 NOTE — PROGRESS NOTES
Nephrology Progress Note  11/12/2022 2:56 PM        Subjective:   Admit Date: 11/11/2022  PCP: Akira You    Interval History: Patient seen in early morning, this is a late entry  He was alert awake and oriented  Had flat affect    Diet: Reported eating some    ROS: No PND, orthopnea or shortness of breath  Urine output about 2 L for the last 24 hours he does have pure wick  No fever and variable blood pressure recorded likely inaccurate    Data:     Current meds:    aspirin  81 mg Oral Daily    atorvastatin  40 mg Oral Nightly    carvedilol  3.125 mg Oral BID WC    clopidogrel  75 mg Oral Daily    isosorbide mononitrate  30 mg Oral Daily    magnesium oxide  400 mg Oral BID    tiotropium  2 puff Inhalation Daily    sodium chloride flush  5-40 mL IntraVENous 2 times per day    enoxaparin  30 mg SubCUTAneous BID    insulin lispro  0-4 Units SubCUTAneous TID WC    insulin lispro  0-4 Units SubCUTAneous Nightly    sodium zirconium cyclosilicate  10 g Oral BID    metOLazone  2.5 mg Oral BID    furosemide  80 mg IntraVENous TID    collagenase   Topical Daily      sodium chloride      dextrose           I/O last 3 completed shifts: In: 484.9 [P.O.:400;  I.V.:34.9; IV Piggyback:50]  Out: 2025 [Urine:2025]    CBC:   Recent Labs     11/11/22  0050 11/12/22  0726   WBC 9.2 4.6   HGB 9.3* 8.4*    147          Recent Labs     11/11/22  0050 11/11/22  1445 11/12/22  0726    138 136   K 5.5* 5.2* 5.1    104 104   CO2 23 28 27   BUN 28* 29* 31*   CREATININE 1.7* 2.0* 2.1*   GLUCOSE 193* 137* 113*       Lab Results   Component Value Date    CALCIUM 8.4 11/12/2022    PHOS 3.5 11/12/2022       Objective:     Vitals: BP (!) 145/70   Pulse 64   Temp 98.1 °F (36.7 °C) (Oral)   Resp 16   Ht 6' (1.829 m)   Wt 270 lb 11.6 oz (122.8 kg)   SpO2 95%   BMI 36.72 kg/m² ,    General appearance: Alert, awake with flat affect  HEENT: Positive for at least 1+ conjunctival pallor  Neck: Seems supple  Lungs: Positive crackles  Heart: Seemed irregular, chest wall implanted cardiac device  Abdomen: Soft  Extremities: 3+ edema including penile and scrotal  Chronic leg infection/wound      Problem List :         Impression :     Acute kidney disease/acute kidney injury-with underlying CKD stage G3 B A3-current problem is fluid overload and infection  Hyperkalemia has been resolved with loop therapy now has fluid overload on diuretics  Chronic soft tissue/skin and bone infection with underlying severe atherosclerotic cardiovascular disease  Multiple other chronic condition including anemia hypoalbuminemia etc.    Recommendation/Plan  :     His urine was bland but had about 1 g/day proteinuria-could be from proximal tubular injury-he also has risk for glomerular disease-including. Infection associated.   He is diabetic and may have diabetic kidney disease--adjust diuretics as he does-watch for adequate \"capillary plasma refill\"-watch for iatrogenic nosocomial complication-appropriate treatment surgical and medical for infection-good glycemic control and follow clinically and biochemically    Blake Paige MD MD

## 2022-11-12 NOTE — PROGRESS NOTES
Cardiology Progress Note     Admit Date:  11/11/2022    Consult reason/ Seen today for :       Subjective and  Overnight Events :  leg swelling is improved   Dropped blood pressure today      Chief complain on admission : 67 y. o.year old who is admitted for  Chief Complaint   Patient presents with    Leg Pain    Post-op Problem     Right foot      Assessment / Plan:  Echo is concerning for right heart failure with dilated right side and RVSP in 80s bilateral lower extremity swelling and scrotal swelling  Stop milrinone drip increase Lasix to 80tid he is in renal failure probably due to venous renal congestion  History of coronary artery disease stable  CHF: Heart failure with preserved EF abnormal troponin in the setting of abnormal kidney function  Titrate medication depending on blood pressure response  HTN: stable, continue present medications  Historically non  compliance is also been an issue  Multiple leg wounds ulcers skin tears due to venous congestion wound care as per primary team   severe arterial  disease on Doppler , s/p toe amputation s/p PCI earlier month ago continue aspirin Plavix  DVT prophylaxis if no contraindication  6.    Dyslipidemia: continue statins     Past medical history:    has a past medical history of Acid reflux, Acute MI ([address] Arthritis, Broken teeth, CAD (coronary artery disease), Cardiomyopathy ([address] CHF (congestive heart failure) ([address] Chronic back pain, Chronic kidney disease, Diabetes mellitus ([address] Diabetic neuropathy ([address] H/O cardiovascular stress test, H/O Doppler ultrasound, H/O percutaneous left heart catheterization, History of irregular heartbeat, History of syncope, Hyperlipidemia, Hypertension, Leg swelling, Necrotic toes (HCC), Neuropathy, neuropathy, PAD (peripheral artery disease) ([address] PVD (peripheral vascular disease) ([address] Sick sinus syndrome ([address] Sleep apnea, Spinal stenosis, Teeth missing, Type 2 diabetes mellitus without complication ([facility] [address] and WD-Chronic foot ulcer, left, with necrosis of bone ([facility] [address] Past surgical history:   has a past surgical history that includes Coronary angioplasty with stent; Dental surgery; Colonoscopy (08/04/2016); pacemaker placement (06/04/2010); vascular surgery; colectomy (Right, 08/26/2016); Toe amputation (Right, 09/12/2017); Toe amputation (Right, 01/09/2018); Cardiac catheterization; Cardiac defibrillator placement (06/04/2010); Coronary angioplasty; Cardiac catheterization (07/14/2017); Cardiac catheterization (11/20/2018); Toe amputation (Left, 12/26/2020); IR TUNNELED CVC PLACE WO SQ PORT/PUMP > 5 YEARS (6/14/2021); Toe amputation (Left, 7/26/2022); and Toe amputation (Right, 10/20/2022). Social History:   reports that he quit smoking about 12 months ago. His smoking use included cigars and cigarettes. He started smoking about 42 years ago. He has a 9.00 pack-year smoking history. He has never used smokeless tobacco. He reports current drug use. Drug: Marijuana Joanne Pleas). He reports that he does not drink alcohol. Family history:  family history includes Cancer in his brother, father, and son; Diabetes in his brother, mother, and sister; Early Death (age of onset: 62) in his sister; Heart Disease in his brother and sister; High Blood Pressure in his brother, brother, and mother; Neuropathy in his sister; Other in his sister; Stroke in his mother; Vision Loss in his mother.     Allergies   Allergen Reactions    Pcn [Penicillins] Hives    Fentanyl Itching       Review of Systems:    All 14 systems were reviewed and are negative  Except for the positive findings  which as documented     /65   Pulse 64   Temp 98.3 °F (36.8 °C) (Oral)   Resp 12   Ht 6' (1.829 m)   Wt 270 lb 11.6 oz (122.8 kg)   SpO2 98%   BMI 36.72 kg/m²     Intake/Output Summary (Last 24 hours) at 11/12/2022 1649  Last data filed at 11/12/2022 1533  Gross per 24 hour Intake 34.92 ml   Output 1500 ml   Net -1465.08 ml     Physical Exam:  Constitutional:  Well developed, Well nourished, No acute distress, Non-toxic appearance. HENT:  Normocephalic, Atraumatic, Bilateral external ears normal, Oropharynx moist, No oral exudates, Nose normal. Neck- Normal range of motion, No tenderness, Supple, No stridor. Eyes:  PERRL, EOMI, Conjunctiva normal, No discharge. Respiratory:  Normal breath sounds, No respiratory distress, No wheezing, No chest tenderness. Cardiovascular:  Normal heart rate, Normal rhythm, No murmurs, No rubs, No gallops, JVP not elevated  Abdomen/GI:  Bowel sounds normal, Soft, No tenderness, No masses, No pulsatile masses. Musculoskeletal:  Intact distal pulses, No edema, No tenderness, No cyanosis, No clubbing. Good range of motion in all major joints. No tenderness to palpation or major deformities noted. Back- No tenderness. Integument:  Warm, Dry, No erythema, No rash. Lymphatic:  No lymphadenopathy noted. Neurologic:  Alert & oriented x 3, Normal motor function, Normal sensory function, No focal deficits noted.    Psychiatric:  Affect  and  Mood :no change    Medications:    aspirin  81 mg Oral Daily    atorvastatin  40 mg Oral Nightly    carvedilol  3.125 mg Oral BID WC    clopidogrel  75 mg Oral Daily    isosorbide mononitrate  30 mg Oral Daily    magnesium oxide  400 mg Oral BID    tiotropium  2 puff Inhalation Daily    sodium chloride flush  5-40 mL IntraVENous 2 times per day    enoxaparin  30 mg SubCUTAneous BID    insulin lispro  0-4 Units SubCUTAneous TID WC    insulin lispro  0-4 Units SubCUTAneous Nightly    sodium zirconium cyclosilicate  10 g Oral BID    metOLazone  2.5 mg Oral BID    furosemide  80 mg IntraVENous TID    collagenase   Topical Daily      sodium chloride      dextrose       albuterol sulfate HFA, oxyCODONE-acetaminophen, sodium chloride flush, sodium chloride, ondansetron **OR** ondansetron, polyethylene glycol, acetaminophen **OR** acetaminophen, glucose, dextrose bolus **OR** dextrose bolus, glucagon (rDNA), dextrose    Lab Data:  CBC:   Recent Labs     11/11/22  0050 11/12/22  0726   WBC 9.2 4.6   HGB 9.3* 8.4*   HCT 31.4* 27.9*   MCV 89.7 88.9    147     BMP:   Recent Labs     11/11/22  0050 11/11/22  1445 11/12/22  0726    138 136   K 5.5* 5.2* 5.1    104 104   CO2 23 28 27   PHOS  --   --  3.5   BUN 28* 29* 31*   CREATININE 1.7* 2.0* 2.1*     PT/INR: No results for input(s): PROTIME, INR in the last 72 hours. BNP:    Recent Labs     11/11/22 0050   PROBNP 9,525*     TROPONIN:   Recent Labs     11/11/22 0050   TROPONINT 0.063*        ECHO :   echocardiogram    Assessment:  67 y. o.year old who is admitted for  Chief Complaint   Patient presents with    Leg Pain    Post-op Problem     Right foot    , active issues as noted below:  Impression:  Principal Problem:    Acute on chronic diastolic heart failure (HCC)  Active Problems:    CHF (congestive heart failure), NYHA class I, acute on chronic, combined (HCC)    Acute on chronic diastolic CHF (congestive heart failure) (HCC)    Leg swelling    Biventricular ICD (implantable cardioverter-defibrillator) in place    WD-PVD (peripheral vascular disease) ([facility] [facility]  Resolved Problems:    * No resolved hospital problems. *            All labs, medications and tests reviewed by myself , continue all other medications of all above medical condition listed as is except for changes mentioned above. Thank you very much for consult , please call with questions.     Haydee Farris MD, MD 11/12/2022 4:49 PM

## 2022-11-12 NOTE — PROGRESS NOTES
Physician Progress Note      Lakisha Patel  [facility] #:                  [id number]  :                       1950  ADMIT DATE:       2022 12:12 AM  DISCH DATE:  RESPONDING  PROVIDER #:        Zohra Russ MD          QUERY TEXT:    Pt admitted with Acute on chronic diastolic heart failure. Pt noted to also   have CAD, HTN, Cardiomyopathy. If possible, please document in progress notes   and discharge summary the etiology of CHF, if able to be determined. The medical record reflects the following:  Risk Factors: Acute on chronic diastolic heart failure, HTM, Cardiomyopathy,   CAD, Valvular disease  Clinical Indicators: Acute on chronic hypoxemic respiratory failure likely   secondary to acute on chronic diastolic heart failure- Gained over x11 lbs   since 2022 per chart review. - TTE in 10/2022 with LVEF of 50-55%, G2DD,   severely dilated RV with severe TR.- BNP 9525,  CXR consistent with pulmonary   congestion,  increasing swelling in his legs and scrotum  Treatment: Started on Lasix 80 mg BID.- Cardiology consulted, follow-up. Recently required Milrinone, CXR    Thank you, Kd Rutledge RN, CDS ([phone])  Options provided:  -- CHF due to Hypertensive Heart Disease  -- CHF due to Hypertensive Heart Disease and CAD  -- CHF not due to Hypertension but due to CAD  -- CHF due to Hypertensive Heart Disease and Valvular Heart Disease  -- CHF not due to Hypertension but due to valvular heart disease  -- CHF due to HTN, CAD, CMP and valvular disease  -- Other - I will add my own diagnosis  -- Disagree - Not applicable / Not valid  -- Disagree - Clinically unable to determine / Unknown  -- Refer to Clinical Documentation Reviewer    PROVIDER RESPONSE TEXT:    This patient has CHF due to hypertensive heart disease and CAD.     Query created by: Jorge Ocasio on 2022 12:22 PM      Electronically signed by:  Zohra Russ MD 2022 7:16 AM

## 2022-11-13 LAB
ALBUMIN SERPL-MCNC: 2.8 GM/DL (ref 3.4–5)
ALP BLD-CCNC: 74 IU/L (ref 40–128)
ALT SERPL-CCNC: 8 U/L (ref 10–40)
ANION GAP SERPL CALCULATED.3IONS-SCNC: 12 MMOL/L (ref 4–16)
AST SERPL-CCNC: 11 IU/L (ref 15–37)
BASOPHILS ABSOLUTE: 0 K/CU MM
BASOPHILS RELATIVE PERCENT: 0.7 % (ref 0–1)
BILIRUB SERPL-MCNC: 0.6 MG/DL (ref 0–1)
BUN BLDV-MCNC: 33 MG/DL (ref 6–23)
C-REACTIVE PROTEIN, HIGH SENSITIVITY: 63.3 MG/L
CALCIUM SERPL-MCNC: 8.2 MG/DL (ref 8.3–10.6)
CHLORIDE BLD-SCNC: 103 MMOL/L (ref 99–110)
CO2: 25 MMOL/L (ref 21–32)
CREAT SERPL-MCNC: 2.4 MG/DL (ref 0.9–1.3)
DIFFERENTIAL TYPE: ABNORMAL
EOSINOPHILS ABSOLUTE: 0.1 K/CU MM
EOSINOPHILS RELATIVE PERCENT: 1.6 % (ref 0–3)
GFR SERPL CREATININE-BSD FRML MDRD: 28 ML/MIN/1.73M2
GLUCOSE BLD-MCNC: 106 MG/DL (ref 70–99)
GLUCOSE BLD-MCNC: 121 MG/DL (ref 70–99)
GLUCOSE BLD-MCNC: 143 MG/DL (ref 70–99)
GLUCOSE BLD-MCNC: 163 MG/DL (ref 70–99)
GLUCOSE BLD-MCNC: 73 MG/DL (ref 70–99)
HCT VFR BLD CALC: 29.2 % (ref 42–52)
HEMOGLOBIN: 8.6 GM/DL (ref 13.5–18)
IMMATURE NEUTROPHIL %: 0 % (ref 0–0.43)
LYMPHOCYTES ABSOLUTE: 1 K/CU MM
LYMPHOCYTES RELATIVE PERCENT: 22.6 % (ref 24–44)
MAGNESIUM: 1.6 MG/DL (ref 1.8–2.4)
MCH RBC QN AUTO: 26.1 PG (ref 27–31)
MCHC RBC AUTO-ENTMCNC: 29.5 % (ref 32–36)
MCV RBC AUTO: 88.5 FL (ref 78–100)
MONOCYTES ABSOLUTE: 0.3 K/CU MM
MONOCYTES RELATIVE PERCENT: 6.8 % (ref 0–4)
NUCLEATED RBC %: 0 %
PDW BLD-RTO: 17.3 % (ref 11.7–14.9)
PHOSPHORUS: 3.8 MG/DL (ref 2.5–4.9)
PLATELET # BLD: 167 K/CU MM (ref 140–440)
PMV BLD AUTO: 9.7 FL (ref 7.5–11.1)
POTASSIUM SERPL-SCNC: 5 MMOL/L (ref 3.5–5.1)
RBC # BLD: 3.3 M/CU MM (ref 4.6–6.2)
SEGMENTED NEUTROPHILS ABSOLUTE COUNT: 3 K/CU MM
SEGMENTED NEUTROPHILS RELATIVE PERCENT: 68.3 % (ref 36–66)
SODIUM BLD-SCNC: 140 MMOL/L (ref 135–145)
TOTAL IMMATURE NEUTOROPHIL: 0 K/CU MM
TOTAL NUCLEATED RBC: 0 K/CU MM
TOTAL PROTEIN: 6 GM/DL (ref 6.4–8.2)
WBC # BLD: 4.4 K/CU MM (ref 4–10.5)

## 2022-11-13 PROCEDURE — 83735 ASSAY OF MAGNESIUM: CPT

## 2022-11-13 PROCEDURE — 87181 SC STD AGAR DILUTION PER AGT: CPT

## 2022-11-13 PROCEDURE — 87077 CULTURE AEROBIC IDENTIFY: CPT

## 2022-11-13 PROCEDURE — 94761 N-INVAS EAR/PLS OXIMETRY MLT: CPT

## 2022-11-13 PROCEDURE — 6360000002 HC RX W HCPCS: Performed by: STUDENT IN AN ORGANIZED HEALTH CARE EDUCATION/TRAINING PROGRAM

## 2022-11-13 PROCEDURE — 87186 SC STD MICRODIL/AGAR DIL: CPT

## 2022-11-13 PROCEDURE — 84100 ASSAY OF PHOSPHORUS: CPT

## 2022-11-13 PROCEDURE — 87081 CULTURE SCREEN ONLY: CPT

## 2022-11-13 PROCEDURE — 80053 COMPREHEN METABOLIC PANEL: CPT

## 2022-11-13 PROCEDURE — 86140 C-REACTIVE PROTEIN: CPT

## 2022-11-13 PROCEDURE — 99233 SBSQ HOSP IP/OBS HIGH 50: CPT | Performed by: INTERNAL MEDICINE

## 2022-11-13 PROCEDURE — 6370000000 HC RX 637 (ALT 250 FOR IP): Performed by: SURGERY

## 2022-11-13 PROCEDURE — 82962 GLUCOSE BLOOD TEST: CPT

## 2022-11-13 PROCEDURE — 6370000000 HC RX 637 (ALT 250 FOR IP): Performed by: STUDENT IN AN ORGANIZED HEALTH CARE EDUCATION/TRAINING PROGRAM

## 2022-11-13 PROCEDURE — 6370000000 HC RX 637 (ALT 250 FOR IP): Performed by: INTERNAL MEDICINE

## 2022-11-13 PROCEDURE — 2580000003 HC RX 258: Performed by: SURGERY

## 2022-11-13 PROCEDURE — 6360000002 HC RX W HCPCS: Performed by: INTERNAL MEDICINE

## 2022-11-13 PROCEDURE — 6360000002 HC RX W HCPCS: Performed by: SURGERY

## 2022-11-13 PROCEDURE — 87070 CULTURE OTHR SPECIMN AEROBIC: CPT

## 2022-11-13 PROCEDURE — 87040 BLOOD CULTURE FOR BACTERIA: CPT

## 2022-11-13 PROCEDURE — 99024 POSTOP FOLLOW-UP VISIT: CPT | Performed by: SURGERY

## 2022-11-13 PROCEDURE — 2140000000 HC CCU INTERMEDIATE R&B

## 2022-11-13 PROCEDURE — 2580000003 HC RX 258: Performed by: STUDENT IN AN ORGANIZED HEALTH CARE EDUCATION/TRAINING PROGRAM

## 2022-11-13 PROCEDURE — 36415 COLL VENOUS BLD VENIPUNCTURE: CPT

## 2022-11-13 PROCEDURE — 85025 COMPLETE CBC W/AUTO DIFF WBC: CPT

## 2022-11-13 PROCEDURE — 2700000000 HC OXYGEN THERAPY PER DAY

## 2022-11-13 RX ORDER — CARVEDILOL 6.25 MG/1
6.25 TABLET ORAL 2 TIMES DAILY WITH MEALS
Status: DISCONTINUED | OUTPATIENT
Start: 2022-11-13 | End: 2022-11-14

## 2022-11-13 RX ORDER — HEPARIN SODIUM 5000 [USP'U]/ML
5000 INJECTION, SOLUTION INTRAVENOUS; SUBCUTANEOUS EVERY 8 HOURS SCHEDULED
Status: DISCONTINUED | OUTPATIENT
Start: 2022-11-14 | End: 2022-11-18 | Stop reason: HOSPADM

## 2022-11-13 RX ORDER — FUROSEMIDE 10 MG/ML
40 INJECTION INTRAMUSCULAR; INTRAVENOUS 2 TIMES DAILY
Status: DISCONTINUED | OUTPATIENT
Start: 2022-11-13 | End: 2022-11-16

## 2022-11-13 RX ORDER — MAGNESIUM SULFATE IN WATER 40 MG/ML
2000 INJECTION, SOLUTION INTRAVENOUS ONCE
Status: COMPLETED | OUTPATIENT
Start: 2022-11-13 | End: 2022-11-13

## 2022-11-13 RX ADMIN — MEROPENEM 1000 MG: 1 INJECTION, POWDER, FOR SOLUTION INTRAVENOUS at 11:51

## 2022-11-13 RX ADMIN — FUROSEMIDE 40 MG: 10 INJECTION, SOLUTION INTRAMUSCULAR; INTRAVENOUS at 18:12

## 2022-11-13 RX ADMIN — SODIUM CHLORIDE, PRESERVATIVE FREE 10 ML: 5 INJECTION INTRAVENOUS at 09:35

## 2022-11-13 RX ADMIN — ENOXAPARIN SODIUM 30 MG: 100 INJECTION SUBCUTANEOUS at 09:35

## 2022-11-13 RX ADMIN — OXYCODONE AND ACETAMINOPHEN 1 TABLET: 5; 325 TABLET ORAL at 15:49

## 2022-11-13 RX ADMIN — COLLAGENASE SANTYL: 250 OINTMENT TOPICAL at 11:53

## 2022-11-13 RX ADMIN — METOLAZONE 2.5 MG: 2.5 TABLET ORAL at 09:35

## 2022-11-13 RX ADMIN — MAGNESIUM OXIDE TAB 400 MG (241.3 MG ELEMENTAL MG) 400 MG: 400 (241.3 MG) TAB at 09:35

## 2022-11-13 RX ADMIN — METOLAZONE 2.5 MG: 2.5 TABLET ORAL at 22:46

## 2022-11-13 RX ADMIN — EMPAGLIFLOZIN 10 MG: 10 TABLET, FILM COATED ORAL at 12:58

## 2022-11-13 RX ADMIN — SODIUM CHLORIDE, PRESERVATIVE FREE 10 ML: 5 INJECTION INTRAVENOUS at 22:48

## 2022-11-13 RX ADMIN — CARVEDILOL 6.25 MG: 6.25 TABLET, FILM COATED ORAL at 18:12

## 2022-11-13 RX ADMIN — FUROSEMIDE 80 MG: 10 INJECTION, SOLUTION INTRAMUSCULAR; INTRAVENOUS at 09:35

## 2022-11-13 RX ADMIN — ASPIRIN 81 MG CHEWABLE TABLET 81 MG: 81 TABLET CHEWABLE at 09:34

## 2022-11-13 RX ADMIN — MEROPENEM 1000 MG: 1 INJECTION, POWDER, FOR SOLUTION INTRAVENOUS at 00:41

## 2022-11-13 RX ADMIN — VANCOMYCIN HYDROCHLORIDE 1000 MG: 1 INJECTION, POWDER, LYOPHILIZED, FOR SOLUTION INTRAVENOUS at 01:27

## 2022-11-13 RX ADMIN — Medication: at 11:54

## 2022-11-13 RX ADMIN — MAGNESIUM SULFATE HEPTAHYDRATE 2000 MG: 2 INJECTION, SOLUTION INTRAVENOUS at 11:48

## 2022-11-13 RX ADMIN — ATORVASTATIN CALCIUM 40 MG: 40 TABLET, FILM COATED ORAL at 22:46

## 2022-11-13 RX ADMIN — MAGNESIUM OXIDE TAB 400 MG (241.3 MG ELEMENTAL MG) 400 MG: 400 (241.3 MG) TAB at 22:46

## 2022-11-13 RX ADMIN — CLOPIDOGREL BISULFATE 75 MG: 75 TABLET ORAL at 09:35

## 2022-11-13 RX ADMIN — Medication: at 00:30

## 2022-11-13 RX ADMIN — SODIUM CHLORIDE, PRESERVATIVE FREE 10 ML: 5 INJECTION INTRAVENOUS at 00:38

## 2022-11-13 RX ADMIN — CARVEDILOL 3.12 MG: 6.25 TABLET, FILM COATED ORAL at 09:35

## 2022-11-13 RX ADMIN — OXYCODONE AND ACETAMINOPHEN 1 TABLET: 5; 325 TABLET ORAL at 02:11

## 2022-11-13 RX ADMIN — ISOSORBIDE MONONITRATE 30 MG: 30 TABLET, EXTENDED RELEASE ORAL at 09:34

## 2022-11-13 ASSESSMENT — ENCOUNTER SYMPTOMS
SHORTNESS OF BREATH: 1
SINUS PAIN: 0
CHEST TIGHTNESS: 0
SINUS PRESSURE: 0
BLOOD IN STOOL: 0
VOICE CHANGE: 0
EYE DISCHARGE: 0
VOMITING: 0
NAUSEA: 0
EYE PAIN: 0
DIARRHEA: 0
BACK PAIN: 1
WHEEZING: 1
ABDOMINAL PAIN: 0
COUGH: 0

## 2022-11-13 ASSESSMENT — PAIN DESCRIPTION - FREQUENCY: FREQUENCY: CONTINUOUS

## 2022-11-13 ASSESSMENT — PAIN DESCRIPTION - ONSET: ONSET: ON-GOING

## 2022-11-13 ASSESSMENT — PAIN DESCRIPTION - LOCATION
LOCATION: FOOT
LOCATION: FOOT
LOCATION: FOOT;TOE (COMMENT WHICH ONE)

## 2022-11-13 ASSESSMENT — PAIN SCALES - GENERAL
PAINLEVEL_OUTOF10: 6
PAINLEVEL_OUTOF10: 6
PAINLEVEL_OUTOF10: 7

## 2022-11-13 ASSESSMENT — PAIN DESCRIPTION - DESCRIPTORS
DESCRIPTORS: SHARP;STABBING
DESCRIPTORS: SHARP

## 2022-11-13 ASSESSMENT — PAIN - FUNCTIONAL ASSESSMENT
PAIN_FUNCTIONAL_ASSESSMENT: PREVENTS OR INTERFERES SOME ACTIVE ACTIVITIES AND ADLS

## 2022-11-13 ASSESSMENT — PAIN DESCRIPTION - ORIENTATION
ORIENTATION: RIGHT;LEFT

## 2022-11-13 ASSESSMENT — PAIN DESCRIPTION - PAIN TYPE: TYPE: ACUTE PAIN;SURGICAL PAIN

## 2022-11-13 NOTE — PROGRESS NOTES
Cardiology Progress Note     Admit Date:  11/11/2022    Consult reason/ Seen today for :       Subjective and  Overnight Events :  leg swelling is improved, Bp is higher today after stopping milrinone was stopped    Negative About 2.5 L      Chief complain on admission : 67 y. o.year old who is admitted for  Chief Complaint   Patient presents with    Leg Pain    Post-op Problem     Right foot      Assessment / Plan:  Echo is concerning for right heart failure with dilated right side and RVSP in 80s bilateral lower extremity swelling and scrotal swelling  Continue  Lasix to 80 Mg tid he is in renal failure probably due to venous renal congestion  History of coronary artery disease stable  CHF: Heart failure with preserved EF abnormal troponin in the setting of abnormal kidney function  Titrate medication depending on blood pressure response  HTN: Increase  coreg 6.25 mg bid   Historically non  compliance is also been an issue  Multiple leg wounds ulcers skin tears due to venous congestion wound care as per primary team   severe arterial  disease on Doppler , s/p toe amputation s/p PCI earlier month ago continue aspirin Plavix  DVT prophylaxis if no contraindication  6.    Dyslipidemia: continue statins     Past medical history:    has a past medical history of Acid reflux, Acute MI ([address] Arthritis, Broken teeth, CAD (coronary artery disease), Cardiomyopathy ([address] CHF (congestive heart failure) ([address] Chronic back pain, Chronic kidney disease, Diabetes mellitus ([address] Diabetic neuropathy ([address] H/O cardiovascular stress test, H/O Doppler ultrasound, H/O percutaneous left heart catheterization, History of irregular heartbeat, History of syncope, Hyperlipidemia, Hypertension, Leg swelling, Necrotic toes (HCC), Neuropathy, neuropathy, PAD (peripheral artery disease) ([address] PVD (peripheral vascular disease) ([address] Sick sinus syndrome ([address] Sleep apnea, Spinal stenosis, Teeth missing, Type 2 diabetes mellitus without complication ([facility] and WD-Chronic foot ulcer, left, with necrosis of bone ([facility] Past surgical history:   has a past surgical history that includes Coronary angioplasty with stent; Dental surgery; Colonoscopy (08/04/2016); pacemaker placement (06/04/2010); vascular surgery; colectomy (Right, 08/26/2016); Toe amputation (Right, 09/12/2017); Toe amputation (Right, 01/09/2018); Cardiac catheterization; Cardiac defibrillator placement (06/04/2010); Coronary angioplasty; Cardiac catheterization (07/14/2017); Cardiac catheterization (11/20/2018); Toe amputation (Left, 12/26/2020); IR TUNNELED CVC PLACE WO SQ PORT/PUMP > 5 YEARS (6/14/2021); Toe amputation (Left, 7/26/2022); and Toe amputation (Right, 10/20/2022). Social History:   reports that he quit smoking about 12 months ago. His smoking use included cigars and cigarettes. He started smoking about 42 years ago. He has a 9.00 pack-year smoking history. He has never used smokeless tobacco. He reports current drug use. Drug: Marijuana Ethan Calamity). He reports that he does not drink alcohol. Family history:  family history includes Cancer in his brother, father, and son; Diabetes in his brother, mother, and sister; Early Death (age of onset: 62) in his sister; Heart Disease in his brother and sister; High Blood Pressure in his brother, brother, and mother; Neuropathy in his sister; Other in his sister; Stroke in his mother; Vision Loss in his mother.     Allergies   Allergen Reactions    Pcn [Penicillins] Hives    Fentanyl Itching       Review of Systems:    All 14 systems were reviewed and are negative  Except for the positive findings  which as documented     BP (!) 167/76   Pulse 66   Temp 98.2 °F (36.8 °C) (Oral)   Resp 14   Ht 6' (1.829 m)   Wt 261 lb 7.5 oz (118.6 kg)   SpO2 100%   BMI 35.46 kg/m²     Intake/Output Summary (Last 24 hours) at 11/13/2022 1639  Last data filed at Daily      sodium chloride      dextrose       albuterol sulfate HFA, oxyCODONE-acetaminophen, sodium chloride flush, sodium chloride, ondansetron **OR** ondansetron, polyethylene glycol, acetaminophen **OR** acetaminophen, glucose, dextrose bolus **OR** dextrose bolus, glucagon (rDNA), dextrose    Lab Data:  CBC:   Recent Labs     11/11/22  0050 11/12/22  0726   WBC 9.2 4.6   HGB 9.3* 8.4*   HCT 31.4* 27.9*   MCV 89.7 88.9    147     BMP:   Recent Labs     11/11/22  1445 11/12/22  0726 11/13/22  0432    136 140   K 5.2* 5.1 5.0    104 103   CO2 28 27 25   PHOS  --  3.5 3.8   BUN 29* 31* 33*   CREATININE 2.0* 2.1* 2.4*     PT/INR: No results for input(s): PROTIME, INR in the last 72 hours. BNP:    Recent Labs     11/11/22 0050   PROBNP 9,525*     TROPONIN:   Recent Labs     11/11/22 0050   TROPONINT 0.063*        ECHO :   echocardiogram    Assessment:  67 y. o.year old who is admitted for  Chief Complaint   Patient presents with    Leg Pain    Post-op Problem     Right foot    , active issues as noted below:  Impression:  Principal Problem:    Acute on chronic diastolic heart failure (HCC)  Active Problems:    CHF (congestive heart failure), NYHA class I, acute on chronic, combined (HCC)    Acute on chronic diastolic CHF (congestive heart failure) (HCC)    Leg swelling    Skin ulcer of left foot including toes with fat layer exposed ([address]    Superficial incisional surgical site infection    Biventricular ICD (implantable cardioverter-defibrillator) in place    WD-PVD (peripheral vascular disease) ([facility]  Resolved Problems:    * No resolved hospital problems. *            All labs, medications and tests reviewed by myself , continue all other medications of all above medical condition listed as is except for changes mentioned above. Thank you very much for consult , please call with questions.     Treva Sabillon MD, MD 11/13/2022 4:39 PM

## 2022-11-13 NOTE — PROGRESS NOTES
Cardiology Progress Note     Admit Date:  11/11/2022    Consult reason/ Seen today for :       Subjective and  Overnight Events: blood pressure stable. Swelling continues to improve. Chief complain on admission : 67 y. o.year old who is admitted for  Chief Complaint   Patient presents with    Leg Pain    Post-op Problem     Right foot      Assessment / Plan:  Echo is concerning for right heart failure with dilated right side and RVSP in 80s bilateral lower extremity swelling and scrotal swelling  Continue Lasix to 80mg Tid he is in renal failure probably due to venous renal congestion. -2.3L fluid balance. History of coronary artery disease stable  CHF: Heart failure with preserved EF abnormal troponin in the setting of abnormal kidney function  Titrate medication depending on blood pressure response  HTN: stable, continue present medications  Historically non  compliance is also been an issue  Multiple leg wounds ulcers skin tears due to venous congestion wound care as per primary team   severe arterial  disease on Doppler , s/p toe amputation s/p PCI earlier month ago continue aspirin Plavix  DVT prophylaxis if no contraindication  11.    Dyslipidemia: continue statins     Past medical history:    has a past medical history of Acid reflux, Acute MI ([address] Arthritis, Broken teeth, CAD (coronary artery disease), Cardiomyopathy ([address] CHF (congestive heart failure) ([address] Chronic back pain, Chronic kidney disease, Diabetes mellitus ([address] Diabetic neuropathy ([address] H/O cardiovascular stress test, H/O Doppler ultrasound, H/O percutaneous left heart catheterization, History of irregular heartbeat, History of syncope, Hyperlipidemia, Hypertension, Leg swelling, Necrotic toes (HCC), Neuropathy, neuropathy, PAD (peripheral artery disease) ([address] PVD (peripheral vascular disease) ([address] Sick sinus syndrome ([address] Sleep apnea, Spinal stenosis, Teeth missing, Type 2 diabetes mellitus without complication ([facility] and WD-Chronic foot ulcer, left, with necrosis of bone ([facility] Past surgical history:   has a past surgical history that includes Coronary angioplasty with stent; Dental surgery; Colonoscopy (08/04/2016); pacemaker placement (06/04/2010); vascular surgery; colectomy (Right, 08/26/2016); Toe amputation (Right, 09/12/2017); Toe amputation (Right, 01/09/2018); Cardiac catheterization; Cardiac defibrillator placement (06/04/2010); Coronary angioplasty; Cardiac catheterization (07/14/2017); Cardiac catheterization (11/20/2018); Toe amputation (Left, 12/26/2020); IR TUNNELED CVC PLACE WO SQ PORT/PUMP > 5 YEARS (6/14/2021); Toe amputation (Left, 7/26/2022); and Toe amputation (Right, 10/20/2022). Social History:   reports that he quit smoking about 12 months ago. His smoking use included cigars and cigarettes. He started smoking about 42 years ago. He has a 9.00 pack-year smoking history. He has never used smokeless tobacco. He reports current drug use. Drug: Marijuana Nacho Hail). He reports that he does not drink alcohol. Family history:  family history includes Cancer in his brother, father, and son; Diabetes in his brother, mother, and sister; Early Death (age of onset: 62) in his sister; Heart Disease in his brother and sister; High Blood Pressure in his brother, brother, and mother; Neuropathy in his sister; Other in his sister; Stroke in his mother; Vision Loss in his mother.     Allergies   Allergen Reactions    Pcn [Penicillins] Hives    Fentanyl Itching       Review of Systems:    All 14 systems were reviewed and are negative  Except for the positive findings  which as documented     /75   Pulse 65   Temp 98 °F (36.7 °C) (Oral)   Resp 16   Ht 6' (1.829 m)   Wt 261 lb 7.5 oz (118.6 kg)   SpO2 97%   BMI 35.46 kg/m²     Intake/Output Summary (Last 24 hours) at 11/13/2022 1034  Last data filed at 11/13/2022 0339  Gross per 24 hour Intake 733.22 ml   Output 1550 ml   Net -816.78 ml     Physical Exam:  Constitutional:  Well developed, Well nourished, No acute distress, Non-toxic appearance. HENT:  Normocephalic, Atraumatic, Bilateral external ears normal, Oropharynx moist, No oral exudates, Nose normal. Neck- Normal range of motion, No tenderness, Supple, No stridor. Eyes:  PERRL, EOMI, Conjunctiva normal, No discharge. Respiratory:  Normal breath sounds, No respiratory distress, No wheezing, No chest tenderness. Cardiovascular:  Normal heart rate, Normal rhythm, No murmurs, No rubs, No gallops, JVP not elevated  Abdomen/GI:  Bowel sounds normal, Soft, No tenderness, No masses, No pulsatile masses. Musculoskeletal:  Intact distal pulses, No edema, No tenderness, No cyanosis, No clubbing. Good range of motion in all major joints. No tenderness to palpation or major deformities noted. Back- No tenderness. Integument:  Warm, Dry, No erythema, No rash. Lymphatic:  No lymphadenopathy noted. Neurologic:  Alert & oriented x 3, Normal motor function, Normal sensory function, No focal deficits noted.    Psychiatric:  Affect  and  Mood :no change    Medications:    sodium hypochlorite   Irrigation Daily    meropenem  1,000 mg IntraVENous Q12H    vancomycin  1,000 mg IntraVENous Q24H    aspirin  81 mg Oral Daily    atorvastatin  40 mg Oral Nightly    carvedilol  3.125 mg Oral BID WC    clopidogrel  75 mg Oral Daily    isosorbide mononitrate  30 mg Oral Daily    magnesium oxide  400 mg Oral BID    tiotropium  2 puff Inhalation Daily    sodium chloride flush  5-40 mL IntraVENous 2 times per day    enoxaparin  30 mg SubCUTAneous BID    insulin lispro  0-4 Units SubCUTAneous TID WC    insulin lispro  0-4 Units SubCUTAneous Nightly    metOLazone  2.5 mg Oral BID    furosemide  80 mg IntraVENous TID    collagenase   Topical Daily      sodium chloride      dextrose       albuterol sulfate HFA, oxyCODONE-acetaminophen, sodium chloride flush, sodium chloride, ondansetron **OR** ondansetron, polyethylene glycol, acetaminophen **OR** acetaminophen, glucose, dextrose bolus **OR** dextrose bolus, glucagon (rDNA), dextrose    Lab Data:  CBC:   Recent Labs     11/11/22  0050 11/12/22  0726   WBC 9.2 4.6   HGB 9.3* 8.4*   HCT 31.4* 27.9*   MCV 89.7 88.9    147     BMP:   Recent Labs     11/11/22  1445 11/12/22  0726 11/13/22  0432    136 140   K 5.2* 5.1 5.0    104 103   CO2 28 27 25   PHOS  --  3.5 3.8   BUN 29* 31* 33*   CREATININE 2.0* 2.1* 2.4*     PT/INR: No results for input(s): PROTIME, INR in the last 72 hours. BNP:    Recent Labs     11/11/22 0050   PROBNP 9,525*     TROPONIN:   Recent Labs     11/11/22 0050   TROPONINT 0.063*        ECHO :   echocardiogram    Assessment:  67 y. o.year old who is admitted for  Chief Complaint   Patient presents with    Leg Pain    Post-op Problem     Right foot    , active issues as noted below:  Impression:  Principal Problem:    Acute on chronic diastolic heart failure (HCC)  Active Problems:    CHF (congestive heart failure), NYHA class I, acute on chronic, combined (HCC)    Acute on chronic diastolic CHF (congestive heart failure) ([facility])    Leg swelling    Skin ulcer of left foot including toes with fat layer exposed ([address]    Superficial incisional surgical site infection    Biventricular ICD (implantable cardioverter-defibrillator) in place    WD-PVD (peripheral vascular disease) ([facility]  Resolved Problems:    * No resolved hospital problems. *            All labs, medications and tests reviewed by myself , continue all other medications of all above medical condition listed as is except for changes mentioned above. Thank you very much for consult , please call with questions.     MICKY Damon - CNP, 11/13/2022 10:34 AM

## 2022-11-13 NOTE — PROGRESS NOTES
Per orders, sutures removed from Right Foot, Great Toe amputation site.   Moist to dry w/ Dakin's drsg applied per orders

## 2022-11-13 NOTE — PLAN OF CARE
Problem: Safety - Adult  Goal: Free from fall injury  Outcome: Progressing     Problem: ABCDS Injury Assessment  Goal: Absence of physical injury  Outcome: Progressing     Problem: Skin/Tissue Integrity  Goal: Absence of new skin breakdown  Description: 1. Monitor for areas of redness and/or skin breakdown  2. Assess vascular access sites hourly  3. Every 4-6 hours minimum:  Change oxygen saturation probe site  4. Every 4-6 hours:  If on nasal continuous positive airway pressure, respiratory therapy assess nares and determine need for appliance change or resting period. Outcome: Progressing     Problem: Chronic Conditions and Co-morbidities  Goal: Patient's chronic conditions and co-morbidity symptoms are monitored and maintained or improved  Outcome: Progressing     Problem: Pain  Goal: Verbalizes/displays adequate comfort level or baseline comfort level  Outcome: Progressing     Problem: Respiratory - Adult  Goal: Achieves optimal ventilation and oxygenation  Outcome: Progressing     Problem: Musculoskeletal - Adult  Goal: Return mobility to safest level of function  Outcome: Progressing  Goal: Return ADL status to a safe level of function  Outcome: Progressing     Problem: Infection - Adult  Goal: Absence of infection at discharge  Outcome: Progressing  Flowsheets (Taken 11/13/2022 0449)  Absence of infection at discharge:   Assess and monitor for signs and symptoms of infection   Monitor lab/diagnostic results   Monitor all insertion sites i.e., indwelling lines, tubes and drains   Administer medications as ordered   Identify and instruct in appropriate isolation precautions for identified infection/condition  Note: + blood cultures. Wound culture sent.  IV antibiotics ordered     Problem: Metabolic/Fluid and Electrolytes - Adult  Goal: Electrolytes maintained within normal limits  Outcome: Not Progressing  Flowsheets (Taken 11/13/2022 0449)  Electrolytes maintained within normal limits:   Monitor labs and assess patient for signs and symptoms of electrolyte imbalances   Administer electrolyte replacement as ordered   Monitor response to electrolyte replacements, including repeat lab results as appropriate   Fluid restriction as ordered  Note: Elevated renal function     Problem: Metabolic/Fluid and Electrolytes - Adult  Goal: Electrolytes maintained within normal limits  Outcome: Not Progressing  Flowsheets (Taken 11/13/2022 4389)  Electrolytes maintained within normal limits:   Monitor labs and assess patient for signs and symptoms of electrolyte imbalances   Administer electrolyte replacement as ordered   Monitor response to electrolyte replacements, including repeat lab results as appropriate   Fluid restriction as ordered  Note: Elevated renal function

## 2022-11-13 NOTE — PROGRESS NOTES
GENERAL SURGERY INPATIENT PROGRESS NOTE  [facility]    PATIENT: Peyton Valentin, 67 y.o., male, MRN: [id number]    Hospital Day:  LOS: 2 days     Peyton Valentin is a 67 y.o. male with acute on chronic CHF, bilateral lower extremity wound s/p right great toe amputation with Dr. Funmi Hilton 10/20/22           Subjective:  Chief Complaint: lower extremity pain  Pain: controlled  BM:    Diet: ADULT DIET; Regular; 5 carb choices (75 gm/meal); Low Fat/Low Chol/High Fiber/CLAUDIA; Low Sodium (2 gm); 1000 ml  Activity: as tolerated    Stable overnight. Edema improving. Objective:    Vitals: /75   Pulse 65   Temp 98 °F (36.7 °C) (Oral)   Resp 16   Ht 6' (1.829 m)   Wt 261 lb 7.5 oz (118.6 kg)   SpO2 97%   BMI 35.46 kg/m²   Vital Signs (Last 24 Hours)  Temp  Av.1 °F (36.7 °C)  Min: 97.9 °F (36.6 °C)  Max: 98.3 °F (36.8 °C)  Pulse  Av.2  Min: 64  Max: 67  BP  Min: 101/51  Max: 145/70  Resp  Avg: 15.4  Min: 11  Max: 18  SpO2  Av.3 %  Min: 94 %  Max: 98 %  Wt Readings from Last 3 Encounters:   22 261 lb 7.5 oz (118.6 kg)   10/24/22 255 lb (115.7 kg)   22 246 lb (111.6 kg)       I/O:  1901 -  0700  In: 768.1 [P.O.:200;  I.V.:62.8]  Out: 2600 [Urine:2600]    IV Fluids:   sodium chloride    dextrose    Scheduled Meds:   heparin (porcine), 5,000 Units, SubCUTAneous, 3 times per day    sodium hypochlorite, , Irrigation, Daily    [COMPLETED] meropenem, 1,000 mg, IntraVENous, Once **FOLLOWED BY** meropenem, 1,000 mg, IntraVENous, Q12H    vancomycin, 1,000 mg, IntraVENous, Q24H    aspirin, 81 mg, Oral, Daily    atorvastatin, 40 mg, Oral, Nightly    carvedilol, 3.125 mg, Oral, BID WC    clopidogrel, 75 mg, Oral, Daily    isosorbide mononitrate, 30 mg, Oral, Daily    magnesium oxide, 400 mg, Oral, BID    tiotropium, 2 puff, Inhalation, Daily    sodium chloride flush, 5-40 mL, IntraVENous, 2 times per day    insulin lispro, 0-4 Units, SubCUTAneous, TID WC    insulin lispro, 0-4 Units, SubCUTAneous, Nightly    metOLazone, 2.5 mg, Oral, BID    furosemide, 80 mg, IntraVENous, TID    collagenase, , Topical, Daily    Physical Exam:  Constitutional:       General: He is not in acute distress. Appearance: He is well-developed. He is obese. He is not diaphoretic. HENT:      Head: Normocephalic and atraumatic. Right Ear: External ear normal.      Left Ear: External ear normal.      Mouth/Throat:      Mouth: Mucous membranes are moist.   Eyes:      General:         Right eye: No discharge. Left eye: No discharge. Neck:      Trachea: No tracheal deviation. Cardiovascular:      Rate and Rhythm: Normal rate and regular rhythm. Pulmonary:      Effort: Pulmonary effort is normal. No respiratory distress. Breath sounds: No wheezing. Abdominal:      General: There is no distension. Palpations: Abdomen is soft. Tenderness: There is no abdominal tenderness. Musculoskeletal:         General: Tenderness (BLE) present. No deformity. Cervical back: Neck supple. Right lower leg: Edema present. Left lower leg: Edema present. Comments: BLE chronic appearing edema - a little better today   Feet:      Comments: Right toes 1, 3, 5 surgically absent. Great toe incision site with dehiscence, decreased seropurulent drainage. Sutures removed  Left foot with multiple toe wounds with some fibrin present, serosanguinous drainage. Skin:     General: Skin is warm and dry. Findings: No rash. Neurological:      Mental Status: He is alert and oriented to person, place, and time.    Psychiatric:         Behavior: Behavior normal.     Labs/Imaging Results:   Recent Results (from the past 24 hour(s))   POCT Glucose    Collection Time: 11/12/22 12:18 PM   Result Value Ref Range    POC Glucose 133 (H) 70 - 99 MG/DL   POCT Glucose    Collection Time: 11/12/22  4:04 PM   Result Value Ref Range    POC Glucose 126 (H) 70 - 99 MG/DL   POCT Glucose    Collection Time: 11/12/22 8:45 PM   Result Value Ref Range    POC Glucose 105 (H) 70 - 99 MG/DL   Culture, Skin    Collection Time: 11/13/22 12:30 AM    Specimen: Skin   Result Value Ref Range    Specimen SKIN     Special Requests       Right 1st toe amputation site wound   obtain aerobic and anaerobic     Comprehensive Metabolic Panel    Collection Time: 11/13/22  4:32 AM   Result Value Ref Range    Sodium 140 135 - 145 MMOL/L    Potassium 5.0 3.5 - 5.1 MMOL/L    Chloride 103 99 - 110 mMol/L    CO2 25 21 - 32 MMOL/L    BUN 33 (H) 6 - 23 MG/DL    Creatinine 2.4 (H) 0.9 - 1.3 MG/DL    Est, Glom Filt Rate 28 (L) >60 mL/min/1.73m2    Glucose 73 70 - 99 MG/DL    Calcium 8.2 (L) 8.3 - 10.6 MG/DL    Albumin 2.8 (L) 3.4 - 5.0 GM/DL    Total Protein 6.0 (L) 6.4 - 8.2 GM/DL    Total Bilirubin 0.6 0.0 - 1.0 MG/DL    ALT 8 (L) 10 - 40 U/L    AST 11 (L) 15 - 37 IU/L    Alkaline Phosphatase 74 40 - 128 IU/L    Anion Gap 12 4 - 16   Magnesium    Collection Time: 11/13/22  4:32 AM   Result Value Ref Range    Magnesium 1.6 (L) 1.8 - 2.4 mg/dl   Phosphorus    Collection Time: 11/13/22  4:32 AM   Result Value Ref Range    Phosphorus 3.8 2.5 - 4.9 MG/DL   POCT Glucose    Collection Time: 11/13/22  9:39 AM   Result Value Ref Range    POC Glucose 106 (H) 70 - 99 MG/DL         Assessment:  Richardson Vogel is a 67 y.o. male with acute on chronic CHF, bilateral lower extremity wounds s/p right great toe amputation with Dr. Parker Martinez 10/20/22     Principal Problem:    Acute on chronic diastolic heart failure (HCC)  Active Problems:    CHF (congestive heart failure), NYHA class I, acute on chronic, combined (HCC)    Acute on chronic diastolic CHF (congestive heart failure) (HCC)    Leg swelling    Skin ulcer of left foot including toes with fat layer exposed (HCC)    Superficial incisional surgical site infection    Biventricular ICD (implantable cardioverter-defibrillator) in place    WD-PVD (peripheral vascular disease) ([facility]  Resolved Problems:    * No resolved hospital problems. *      Plan:  Discussed findings and options with Richardson Vogel. Dehiscence of incision. Removed sutures, pack with Dakins moistened gauze, wet to dry. Monitor progress, so far appears to be superficial dehiscence and infection. Cultures pending - continue empiric vanc/merrem for now.  Hx of MRSA   Left foot ulcerations - mild fibrin but not grossly infected - Santyl with dressing changes  Thank you for the consultation and the opportunity to care for Richardson Vogel    Electronically signed: Pranay Leiva MD 11/13/2022 10:56 AM

## 2022-11-13 NOTE — PROGRESS NOTES
[facility]  consulted by Dr. Alina Peterson  for monitoring and adjustment. Indication for treatment: Vancomycin indication: SSTI with risk factors   Goal trough: Trough Goal: 10-15 mcg/mL  AUC/RJ: <500    Risk Factors for MRSA Identified:   Hospitalization within the past 90 days, Received IV antibiotics within the past 90 days    Pertinent Laboratory Values:   Temp Readings from Last 3 Encounters:   11/12/22 98.3 °F (36.8 °C) (Oral)   10/24/22 98.2 °F (36.8 °C) (Oral)   08/23/22 97.2 °F (36.2 °C) (Temporal)     Recent Labs     11/11/22  0050 11/11/22  0931 11/12/22  0726   WBC 9.2  --  4.6   LACTATE 2.5* 0.8  --      Recent Labs     11/11/22  0050 11/11/22  1445 11/12/22  0726   BUN 28* 29* 31*   CREATININE 1.7* 2.0* 2.1*     Estimated Creatinine Clearance: 43 mL/min (A) (based on SCr of 2.1 mg/dL (H)). Intake/Output Summary (Last 24 hours) at 11/13/2022 0002  Last data filed at 11/12/2022 2209  Gross per 24 hour   Intake 234.92 ml   Output 1300 ml   Net -1065.08 ml       Pertinent Cultures:   Date    Source    Results  10/12              Surgical sp   Pseudomonas, Alcaligenes Faecalis  11/11   Blood    Ordered  11/13   MRSA Nasal   Ordered      Vancomycin level:   TROUGH:  No results for input(s): VANCOTROUGH in the last 72 hours. RANDOM:  No results for input(s): VANCORANDOM in the last 72 hours. Assessment:  HPI: A 67 y.o male who presents with concern for post-operative wound. He accidentally bumped his right foot and it started to bleed through his dressings. He was recently admitted to the hospital and had his right great toe and two toes of his left foot amputated. SCr, BUN, and urine output: CKD   Day(s) of therapy: 1 of 7  Vancomycin concentration: Pending    Plan:  Received vancomycin 2000 mg x 1 on 11/11.   Will start vancomycin 1000 mg IVPB q24h  Predicted trough of 13.5 and AUC < 500  Pharmacy will continue to monitor patient and adjust therapy as indicated    VANCOMYCIN CONCENTRATION SCHEDULED FOR 11/15 @0600    Thank you for the consult.   Devi Martinez, [facility]  11/13/2022 12:02 AM

## 2022-11-13 NOTE — PROGRESS NOTES
Nephrology Progress Note  11/13/2022 11:35 AM        Subjective:   Admit Date: 11/11/2022  PCP: Blaire Dorado    Interval History: No major event, feeling little bit better  He thinks leg edema is little bit better    Diet: Reasonable    ROS: No shortness of breath, PND or orthopnea  Urine output 1.6 L of fluid for the last 24 hours  No fever, acceptable blood pressure    Data:     Current meds:    [START ON 11/14/2022] heparin (porcine)  5,000 Units SubCUTAneous 3 times per day    magnesium sulfate  2,000 mg IntraVENous Once    sodium hypochlorite   Irrigation Daily    meropenem  1,000 mg IntraVENous Q12H    vancomycin  1,000 mg IntraVENous Q24H    aspirin  81 mg Oral Daily    atorvastatin  40 mg Oral Nightly    carvedilol  3.125 mg Oral BID WC    clopidogrel  75 mg Oral Daily    isosorbide mononitrate  30 mg Oral Daily    magnesium oxide  400 mg Oral BID    tiotropium  2 puff Inhalation Daily    sodium chloride flush  5-40 mL IntraVENous 2 times per day    insulin lispro  0-4 Units SubCUTAneous TID WC    insulin lispro  0-4 Units SubCUTAneous Nightly    metOLazone  2.5 mg Oral BID    furosemide  80 mg IntraVENous TID    collagenase   Topical Daily      sodium chloride      dextrose           I/O last 3 completed shifts: In: 768.1 [P.O.:200; I.V.:62.8;  IV Piggyback:505.4]  Out: 2600 [Urine:2600]    CBC:   Recent Labs     11/11/22  0050 11/12/22  0726   WBC 9.2 4.6   HGB 9.3* 8.4*    147          Recent Labs     11/11/22  1445 11/12/22  0726 11/13/22  0432    136 140   K 5.2* 5.1 5.0    104 103   CO2 28 27 25   BUN 29* 31* 33*   CREATININE 2.0* 2.1* 2.4*   GLUCOSE 137* 113* 73       Lab Results   Component Value Date    CALCIUM 8.2 (L) 11/13/2022    PHOS 3.8 11/13/2022       Objective:     Vitals: BP (!) 167/76   Pulse 66   Temp 98.2 °F (36.8 °C) (Oral)   Resp 13   Ht 6' (1.829 m)   Wt 261 lb 7.5 oz (118.6 kg)   SpO2 100%   BMI 35.46 kg/m² ,    General appearance: Alert, awake and oriented  HEENT: Positive conjunctival pallor  Neck: Supple  Lungs: Adventitious breath sound  Heart: Seems regular rate and rhythm this morning, chest wall implanted cardiac device  Abdomen: Soft  Extremities: Less edema but still has scrotal, penile and leg and thigh edema  Chronic leg infection      Problem List :         Impression :     Acute kidney injury with underlying CKD stage G3 B A3-mainly from fluid overload but has some proteinuria now-also has diabetic kidney disease-usually in does not do very well with long-term diuretics  Fluid overload predominantly right heart failure-with underlying atherosclerotic cardiovascular disease  Underlying diabetes mellitus with proteinuria    Recommendation/Plan  :     Add SGLT2 inhibitors Jardiance 10 mg daily  Reduce diuretics-usually he gets intravascular depleted-based on his several prior admission  Appropriate wound infection  Watch for iatrogenic and nosocomial complication  Good glycemic control and follow clinically and biochemically      Remy Coronado MD MD

## 2022-11-14 PROBLEM — R78.81 BACTEREMIA DUE TO ENTEROCOCCUS: Status: ACTIVE | Noted: 2022-11-14

## 2022-11-14 PROBLEM — B95.2 BACTEREMIA DUE TO ENTEROCOCCUS: Status: ACTIVE | Noted: 2022-11-14

## 2022-11-14 LAB
ANION GAP SERPL CALCULATED.3IONS-SCNC: 11 MMOL/L (ref 4–16)
BASOPHILS ABSOLUTE: 0 K/CU MM
BASOPHILS RELATIVE PERCENT: 0.4 % (ref 0–1)
BUN BLDV-MCNC: 34 MG/DL (ref 6–23)
CALCIUM SERPL-MCNC: 8.5 MG/DL (ref 8.3–10.6)
CHLORIDE BLD-SCNC: 99 MMOL/L (ref 99–110)
CO2: 29 MMOL/L (ref 21–32)
CREAT SERPL-MCNC: 2.3 MG/DL (ref 0.9–1.3)
CULTURE: ABNORMAL
CULTURE: ABNORMAL
DIFFERENTIAL TYPE: ABNORMAL
EOSINOPHILS ABSOLUTE: 0.1 K/CU MM
EOSINOPHILS RELATIVE PERCENT: 2.1 % (ref 0–3)
GFR SERPL CREATININE-BSD FRML MDRD: 29 ML/MIN/1.73M2
GLUCOSE BLD-MCNC: 112 MG/DL (ref 70–99)
GLUCOSE BLD-MCNC: 135 MG/DL (ref 70–99)
GLUCOSE BLD-MCNC: 169 MG/DL (ref 70–99)
GLUCOSE BLD-MCNC: 182 MG/DL (ref 70–99)
GLUCOSE BLD-MCNC: 209 MG/DL (ref 70–99)
HCT VFR BLD CALC: 28.6 % (ref 42–52)
HEMOGLOBIN: 8.7 GM/DL (ref 13.5–18)
IMMATURE NEUTROPHIL %: 0.2 % (ref 0–0.43)
LYMPHOCYTES ABSOLUTE: 1.1 K/CU MM
LYMPHOCYTES RELATIVE PERCENT: 23.2 % (ref 24–44)
Lab: ABNORMAL
MCH RBC QN AUTO: 26.6 PG (ref 27–31)
MCHC RBC AUTO-ENTMCNC: 30.4 % (ref 32–36)
MCV RBC AUTO: 87.5 FL (ref 78–100)
MONOCYTES ABSOLUTE: 0.4 K/CU MM
MONOCYTES RELATIVE PERCENT: 8.1 % (ref 0–4)
NUCLEATED RBC %: 0 %
PDW BLD-RTO: 17.2 % (ref 11.7–14.9)
PLATELET # BLD: 167 K/CU MM (ref 140–440)
PMV BLD AUTO: 9.5 FL (ref 7.5–11.1)
POTASSIUM SERPL-SCNC: 4.6 MMOL/L (ref 3.5–5.1)
RBC # BLD: 3.27 M/CU MM (ref 4.6–6.2)
SEGMENTED NEUTROPHILS ABSOLUTE COUNT: 3.1 K/CU MM
SEGMENTED NEUTROPHILS RELATIVE PERCENT: 66 % (ref 36–66)
SODIUM BLD-SCNC: 139 MMOL/L (ref 135–145)
SPECIMEN: ABNORMAL
TOTAL IMMATURE NEUTOROPHIL: 0.01 K/CU MM
TOTAL NUCLEATED RBC: 0 K/CU MM
UREA NITROGEN, UR: 218 MG/DL
WBC # BLD: 4.7 K/CU MM (ref 4–10.5)

## 2022-11-14 PROCEDURE — 6370000000 HC RX 637 (ALT 250 FOR IP): Performed by: STUDENT IN AN ORGANIZED HEALTH CARE EDUCATION/TRAINING PROGRAM

## 2022-11-14 PROCEDURE — 2580000003 HC RX 258: Performed by: STUDENT IN AN ORGANIZED HEALTH CARE EDUCATION/TRAINING PROGRAM

## 2022-11-14 PROCEDURE — 87040 BLOOD CULTURE FOR BACTERIA: CPT

## 2022-11-14 PROCEDURE — 36415 COLL VENOUS BLD VENIPUNCTURE: CPT

## 2022-11-14 PROCEDURE — APPSS45 APP SPLIT SHARED TIME 31-45 MINUTES: Performed by: NURSE PRACTITIONER

## 2022-11-14 PROCEDURE — 6370000000 HC RX 637 (ALT 250 FOR IP): Performed by: INTERNAL MEDICINE

## 2022-11-14 PROCEDURE — 94761 N-INVAS EAR/PLS OXIMETRY MLT: CPT

## 2022-11-14 PROCEDURE — 6360000002 HC RX W HCPCS: Performed by: SURGERY

## 2022-11-14 PROCEDURE — 99233 SBSQ HOSP IP/OBS HIGH 50: CPT | Performed by: INTERNAL MEDICINE

## 2022-11-14 PROCEDURE — 80053 COMPREHEN METABOLIC PANEL: CPT

## 2022-11-14 PROCEDURE — 99024 POSTOP FOLLOW-UP VISIT: CPT | Performed by: PHYSICIAN ASSISTANT

## 2022-11-14 PROCEDURE — 6360000002 HC RX W HCPCS: Performed by: STUDENT IN AN ORGANIZED HEALTH CARE EDUCATION/TRAINING PROGRAM

## 2022-11-14 PROCEDURE — 82962 GLUCOSE BLOOD TEST: CPT

## 2022-11-14 PROCEDURE — 2580000003 HC RX 258: Performed by: SURGERY

## 2022-11-14 PROCEDURE — 6360000002 HC RX W HCPCS: Performed by: INTERNAL MEDICINE

## 2022-11-14 PROCEDURE — 2140000000 HC CCU INTERMEDIATE R&B

## 2022-11-14 PROCEDURE — 86140 C-REACTIVE PROTEIN: CPT

## 2022-11-14 PROCEDURE — 94640 AIRWAY INHALATION TREATMENT: CPT

## 2022-11-14 PROCEDURE — 85025 COMPLETE CBC W/AUTO DIFF WBC: CPT

## 2022-11-14 PROCEDURE — APPNB30 APP NON BILLABLE TIME 0-30 MINS: Performed by: PHYSICIAN ASSISTANT

## 2022-11-14 PROCEDURE — 80048 BASIC METABOLIC PNL TOTAL CA: CPT

## 2022-11-14 PROCEDURE — 6370000000 HC RX 637 (ALT 250 FOR IP): Performed by: NURSE PRACTITIONER

## 2022-11-14 PROCEDURE — 99223 1ST HOSP IP/OBS HIGH 75: CPT | Performed by: INTERNAL MEDICINE

## 2022-11-14 RX ORDER — CARVEDILOL 6.25 MG/1
12.5 TABLET ORAL 2 TIMES DAILY WITH MEALS
Status: DISCONTINUED | OUTPATIENT
Start: 2022-11-15 | End: 2022-11-15

## 2022-11-14 RX ORDER — HYDRALAZINE HYDROCHLORIDE 20 MG/ML
10 INJECTION INTRAMUSCULAR; INTRAVENOUS EVERY 6 HOURS PRN
Status: DISCONTINUED | OUTPATIENT
Start: 2022-11-14 | End: 2022-11-18 | Stop reason: HOSPADM

## 2022-11-14 RX ORDER — ISOSORBIDE MONONITRATE 30 MG/1
60 TABLET, EXTENDED RELEASE ORAL DAILY
Status: DISCONTINUED | OUTPATIENT
Start: 2022-11-15 | End: 2022-11-18 | Stop reason: HOSPADM

## 2022-11-14 RX ORDER — HYDROCODONE BITARTRATE AND ACETAMINOPHEN 5; 325 MG/1; MG/1
1 TABLET ORAL ONCE
Status: COMPLETED | OUTPATIENT
Start: 2022-11-14 | End: 2022-11-14

## 2022-11-14 RX ADMIN — Medication: at 06:01

## 2022-11-14 RX ADMIN — TIOTROPIUM BROMIDE INHALATION SPRAY 2 PUFF: 3.12 SPRAY, METERED RESPIRATORY (INHALATION) at 09:14

## 2022-11-14 RX ADMIN — HYDROCODONE BITARTRATE AND ACETAMINOPHEN 1 TABLET: 5; 325 TABLET ORAL at 23:22

## 2022-11-14 RX ADMIN — MAGNESIUM OXIDE TAB 400 MG (241.3 MG ELEMENTAL MG) 400 MG: 400 (241.3 MG) TAB at 08:16

## 2022-11-14 RX ADMIN — CARVEDILOL 6.25 MG: 6.25 TABLET, FILM COATED ORAL at 17:34

## 2022-11-14 RX ADMIN — MAGNESIUM OXIDE TAB 400 MG (241.3 MG ELEMENTAL MG) 400 MG: 400 (241.3 MG) TAB at 23:22

## 2022-11-14 RX ADMIN — HEPARIN SODIUM 5000 UNITS: 5000 INJECTION INTRAVENOUS; SUBCUTANEOUS at 23:24

## 2022-11-14 RX ADMIN — OXYCODONE AND ACETAMINOPHEN 1 TABLET: 5; 325 TABLET ORAL at 04:24

## 2022-11-14 RX ADMIN — HEPARIN SODIUM 5000 UNITS: 5000 INJECTION INTRAVENOUS; SUBCUTANEOUS at 06:00

## 2022-11-14 RX ADMIN — ATORVASTATIN CALCIUM 40 MG: 40 TABLET, FILM COATED ORAL at 23:22

## 2022-11-14 RX ADMIN — METOLAZONE 2.5 MG: 2.5 TABLET ORAL at 23:22

## 2022-11-14 RX ADMIN — MEROPENEM 1000 MG: 1 INJECTION, POWDER, FOR SOLUTION INTRAVENOUS at 01:03

## 2022-11-14 RX ADMIN — SODIUM CHLORIDE, PRESERVATIVE FREE 5 ML: 5 INJECTION INTRAVENOUS at 04:14

## 2022-11-14 RX ADMIN — ACETAMINOPHEN 650 MG: 325 TABLET ORAL at 01:03

## 2022-11-14 RX ADMIN — FUROSEMIDE 40 MG: 10 INJECTION, SOLUTION INTRAMUSCULAR; INTRAVENOUS at 17:34

## 2022-11-14 RX ADMIN — METOLAZONE 2.5 MG: 2.5 TABLET ORAL at 08:16

## 2022-11-14 RX ADMIN — HYDRALAZINE HYDROCHLORIDE 10 MG: 20 INJECTION INTRAMUSCULAR; INTRAVENOUS at 23:46

## 2022-11-14 RX ADMIN — COLLAGENASE SANTYL: 250 OINTMENT TOPICAL at 06:02

## 2022-11-14 RX ADMIN — EMPAGLIFLOZIN 10 MG: 10 TABLET, FILM COATED ORAL at 08:16

## 2022-11-14 RX ADMIN — SODIUM CHLORIDE, PRESERVATIVE FREE 10 ML: 5 INJECTION INTRAVENOUS at 23:26

## 2022-11-14 RX ADMIN — SODIUM CHLORIDE, PRESERVATIVE FREE 10 ML: 5 INJECTION INTRAVENOUS at 06:00

## 2022-11-14 RX ADMIN — HYDRALAZINE HYDROCHLORIDE 10 MG: 20 INJECTION INTRAMUSCULAR; INTRAVENOUS at 17:34

## 2022-11-14 RX ADMIN — ISOSORBIDE MONONITRATE 30 MG: 30 TABLET, EXTENDED RELEASE ORAL at 08:16

## 2022-11-14 RX ADMIN — COLLAGENASE SANTYL: 250 OINTMENT TOPICAL at 08:25

## 2022-11-14 RX ADMIN — SODIUM CHLORIDE, PRESERVATIVE FREE 10 ML: 5 INJECTION INTRAVENOUS at 08:21

## 2022-11-14 RX ADMIN — ASPIRIN 81 MG CHEWABLE TABLET 81 MG: 81 TABLET CHEWABLE at 08:16

## 2022-11-14 RX ADMIN — CARVEDILOL 6.25 MG: 6.25 TABLET, FILM COATED ORAL at 08:16

## 2022-11-14 RX ADMIN — FUROSEMIDE 40 MG: 10 INJECTION, SOLUTION INTRAMUSCULAR; INTRAVENOUS at 08:16

## 2022-11-14 RX ADMIN — Medication: at 08:27

## 2022-11-14 RX ADMIN — VANCOMYCIN HYDROCHLORIDE 1000 MG: 1 INJECTION, POWDER, LYOPHILIZED, FOR SOLUTION INTRAVENOUS at 04:17

## 2022-11-14 RX ADMIN — CLOPIDOGREL BISULFATE 75 MG: 75 TABLET ORAL at 08:16

## 2022-11-14 RX ADMIN — HEPARIN SODIUM 5000 UNITS: 5000 INJECTION INTRAVENOUS; SUBCUTANEOUS at 15:41

## 2022-11-14 ASSESSMENT — PAIN DESCRIPTION - PAIN TYPE
TYPE: ACUTE PAIN;SURGICAL PAIN
TYPE: ACUTE PAIN

## 2022-11-14 ASSESSMENT — ENCOUNTER SYMPTOMS
CONSTIPATION: 0
CHOKING: 0
EYE ITCHING: 0
SORE THROAT: 0
STRIDOR: 0
ANAL BLEEDING: 0
PHOTOPHOBIA: 0
APNEA: 0
EYE REDNESS: 0
RECTAL PAIN: 0
BACK PAIN: 0
COLOR CHANGE: 0

## 2022-11-14 ASSESSMENT — PAIN DESCRIPTION - FREQUENCY: FREQUENCY: CONTINUOUS

## 2022-11-14 ASSESSMENT — PAIN SCALES - GENERAL
PAINLEVEL_OUTOF10: 5
PAINLEVEL_OUTOF10: 9
PAINLEVEL_OUTOF10: 7
PAINLEVEL_OUTOF10: 7
PAINLEVEL_OUTOF10: 0
PAINLEVEL_OUTOF10: 7

## 2022-11-14 ASSESSMENT — PAIN DESCRIPTION - LOCATION
LOCATION: FOOT
LOCATION: FOOT
LOCATION: HEAD

## 2022-11-14 ASSESSMENT — PAIN DESCRIPTION - ORIENTATION
ORIENTATION: RIGHT;LEFT
ORIENTATION: RIGHT;LEFT
ORIENTATION: ANTERIOR

## 2022-11-14 ASSESSMENT — PAIN DESCRIPTION - DESCRIPTORS
DESCRIPTORS: SHOOTING;SHARP
DESCRIPTORS: ACHING
DESCRIPTORS: SHOOTING;SHARP

## 2022-11-14 NOTE — PROGRESS NOTES
V2.0  [facility] [place]      Name:  Naseem Levin /Age/Sex: 1950  (67 y.o. male)   MRN & CSN:  [id number] & [id number] Encounter Date/Time: 2022 6:22 PM EST    Location:  2908/7560-D PCP: [facility] Day: 4    Assessment and Plan:   Naseem Levin is a 67 y.o. male with past medical history of peripheral artery disease status post PCI of left SFA, CKD stage III, multiple foot wounds, heart failure with preserved ejection fraction, hypertension, diabetes mellitus type 2, who presents with Acute on chronic diastolic heart failure (HCC)    Acute on chronic hypoxic respiratory failure  Acute on chronic diastolic heart failure exacerbation with significant right heart involvement  Initially was on Lasix 40 mg 3 times daily upon admission, de-escalated to twice daily for volume management  Strict intake and output  Measure daily weight with goal dry weight of 260 pounds  Cardiology following    Infected postop wound  E faecalis bacteremia  Right hallux amputation at [facility] on 10/20/2022  Blood cultures 1 out of 2 positive  Was initially getting meropenem and vancomycin was added after blood cultures were positive. TTE is pending  Infectious diseases consulted, detailed discussion recommending HENRI  Repeat blood cultures    NANCY on chronic kidney disease stage III  Unsure of its the new baseline  Baseline creatinine previously 1.6-1.8  Good urine output  Renally dose medications  Avoid nephrotoxic agents  Nephrology consulted, appreciate recommendations    Non-insulin-dependent diabetes mellitus type 2  HbA1c 7.1% in 2022  Jardiance  Low-dose insulin sliding scale AC at bedtime    Peripheral artery disease status post PCI of left SFA  Continue aspirin Plavix and statin    Hypertension  Continue beta-blocker and Imdur    COPD, not in exacerbation  On 2 L home oxygen  Continue COPD regimen    Diet ADULT DIET; Regular; 5 carb choices (75 gm/meal);  Low Fat/Low Chol/High Fiber/CLAUDIA; Low Sodium (2 gm); 1000 ml  ADULT ORAL NUTRITION SUPPLEMENT; Breakfast, Dinner; Wound Healing Oral Supplement   DVT Prophylaxis [] Lovenox, [x]  Heparin, [] SCDs, [] Ambulation,  [] Eliquis, [] Xarelto  [] Coumadin   Code Status Full Code   Disposition From: Home  Expected Disposition: Home  Estimated Date of Discharge: 3 days  Patient requires continued admission due to HENRI, infectious disease recommendations   Surrogate Decision Maker/ POA Daughter     Subjective:     Chief Complaint: Shortness of breath, right leg pain    Joseph Eller is a 67 y.o. male who presents with progressively worsening shortness of breath and right foot pain. Patient was seen and evaluated at bedside earlier this morning. Patient was alert oriented x3. Hemodynamically stable. Reported that his shortness of breath is markedly improved. No significant overnight events noticed. Objective: Intake/Output Summary (Last 24 hours) at 11/14/2022 1822  Last data filed at 11/14/2022 1519  Gross per 24 hour   Intake 690.48 ml   Output 3100 ml   Net -2409.52 ml        Vitals:   Vitals:    11/14/22 1734   BP: (!) 169/69   Pulse: 64   Resp:    Temp:    SpO2:        Physical Exam:   Physical Exam  Vitals reviewed. Constitutional:       Appearance: He is obese. HENT:      Head: Normocephalic and atraumatic. Nose: Congestion present. Mouth/Throat:      Mouth: Mucous membranes are dry. Pharynx: Oropharynx is clear. Eyes:      General: No scleral icterus. Conjunctiva/sclera: Conjunctivae normal.   Cardiovascular:      Rate and Rhythm: Normal rate and regular rhythm. Pulses: Normal pulses. Heart sounds: Murmur heard. Pulmonary:      Effort: Pulmonary effort is normal.      Breath sounds: Normal breath sounds. No wheezing, rhonchi or rales. Abdominal:      General: Bowel sounds are normal. There is no distension. Palpations: Abdomen is soft. Tenderness: There is no abdominal tenderness. Musculoskeletal:         General: Deformity present. Normal range of motion. Cervical back: Neck supple. No rigidity. Right lower leg: Edema present. Left lower leg: Edema present. Skin:     Coloration: Skin is not jaundiced or pale. Neurological:      General: No focal deficit present. Mental Status: He is alert and oriented to person, place, and time. Mental status is at baseline.           Medications:   Medications:    [START ON 11/15/2022] isosorbide mononitrate  60 mg Oral Daily    heparin (porcine)  5,000 Units SubCUTAneous 3 times per day    furosemide  40 mg IntraVENous BID    empagliflozin  10 mg Oral Daily    carvedilol  6.25 mg Oral BID WC    sodium hypochlorite   Irrigation Daily    vancomycin  1,000 mg IntraVENous Q24H    aspirin  81 mg Oral Daily    atorvastatin  40 mg Oral Nightly    clopidogrel  75 mg Oral Daily    magnesium oxide  400 mg Oral BID    tiotropium  2 puff Inhalation Daily    sodium chloride flush  5-40 mL IntraVENous 2 times per day    insulin lispro  0-4 Units SubCUTAneous TID WC    insulin lispro  0-4 Units SubCUTAneous Nightly    metOLazone  2.5 mg Oral BID    collagenase   Topical Daily      Infusions:    sodium chloride      dextrose       PRN Meds: hydrALAZINE, 10 mg, Q6H PRN  albuterol sulfate HFA, 2 puff, Q4H PRN  sodium chloride flush, 5-40 mL, PRN  sodium chloride, , PRN  ondansetron, 4 mg, Q8H PRN   Or  ondansetron, 4 mg, Q6H PRN  polyethylene glycol, 17 g, Daily PRN  acetaminophen, 650 mg, Q6H PRN   Or  acetaminophen, 650 mg, Q6H PRN  glucose, 4 tablet, PRN  dextrose bolus, 125 mL, PRN   Or  dextrose bolus, 250 mL, PRN  glucagon (rDNA), 1 mg, PRN  dextrose, , Continuous PRN        Labs      Recent Results (from the past 24 hour(s))   POCT Glucose    Collection Time: 11/13/22  9:17 PM   Result Value Ref Range    POC Glucose 121 (H) 70 - 99 MG/DL   POCT Glucose    Collection Time: 11/14/22  8:13 AM   Result Value Ref Range    POC

## 2022-11-14 NOTE — PROGRESS NOTES
Met with patient and daughter. Re- Introduced myself as the Heart failure education RSAMARIA. I saw this patient for heart failure education on 10/11. Initially he said he did not recall any of the information, but as I began to re-teach he stated he remembered. Information reviewed with patient and daughter. Daughter reports to being his primary assistant. Admitting diagnosis-acute on chronic diastolic heart failure  Cardiologist- Wilma  Heart Failure Education Nurse consulted- Yes   Ejection fraction  50-55 % as of 10/22  Pro BNP- 9,525 on 11/11  Hospital follow up appt-  11/22 at [facility]   Patient informed of appointment and appointment added to AVS  Cardiac rehabilitation referral- N/A   ICD information-N/A   Pneumonia vaccine- up to date   PCP- Slade   Patient has a digital scale? Yes   Transportation- has transportation    Able to obtain medications without difficulty? - Yes- Patient denies difficulty in obtaining or taking medications. Reviewed Heart failure patient education book with patient and daughter. Questions answered. Reviewed the Stop Light Handout with patient and instructed when to call his provider. Daughter was engaged and attentive during education session. The following handouts were reviewed with patient and patient was given a copy of the following : Heart Failure education booklet, the [employer] handout, the 'Stop Light' Handout, Staying Motivated- Finding Your Why, a link to the [address] with Heart Failure Interactive workbook and a list of heart failure related education available on the hospital TV and how to access it.

## 2022-11-14 NOTE — PROGRESS NOTES
General Surgery  Dr. Shereen Napier and Usman Valentin, [facility] Day: 4    Chief Complaint on Admission: CHF      Subjective:     Kathy Larsen is a 67 y.o. male who is s/p R great toe amputation, now with dehiscence and superficial infection. Patient denies BLE pain and swelling. Tolerating ADULT DIET; Regular; 5 carb choices (75 gm/meal); Low Fat/Low Chol/High Fiber/CLAUDIA; Low Sodium (2 gm); 1000 ml. ROS:  Review of Systems   Constitutional:  Negative for chills and fever. HENT:  Negative for ear pain, mouth sores, sore throat and tinnitus. Eyes:  Negative for photophobia, redness and itching. Respiratory:  Negative for apnea, choking and stridor. Cardiovascular:  Negative for chest pain and palpitations. Gastrointestinal:  Negative for anal bleeding, constipation and rectal pain. Endocrine: Negative for polydipsia. Genitourinary:  Negative for enuresis, flank pain and hematuria. Musculoskeletal:  Positive for arthralgias and gait problem. Negative for back pain, joint swelling and myalgias. Skin:  Positive for wound. Negative for color change and pallor. Allergic/Immunologic: Negative for environmental allergies. Neurological:  Negative for syncope and speech difficulty. Psychiatric/Behavioral:  Negative for confusion and hallucinations.       Allergies  Pcn [penicillins] and Fentanyl          Diagnosis Date    Acid reflux     Acute MI ([facility] 2004, 2008    Arthritis     Back    Broken teeth     Upper Front    CAD (coronary artery disease)     Sees Dr. Damir Pfeiffer [facility])     per old chart    CHF (congestive heart failure) ([facility]     Chronic back pain     Chronic kidney disease     STAGE 3 KIDNEY FAILURE- \"from my diabetes- do not follow with any one- have seen Dr Jimbo Del Rio in the past\"    Diabetes mellitus [facility]) Dx 1965    per old chart pt has been diabetic since age 13    Diabetic neuropathy ([facility]     \"in my feet\"    H/O cardiovascular stress test 08/25/2016    H/O Doppler ultrasound 2018    Moderate disease of the right lower extremity with an JALEN of 0.72. Moderate to severe disease of the left lower extremity with an JALEN of 0.55. 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    Neuropathy     both feet    neuropathy     PAD (peripheral artery disease) ([facility]) 2018    PVD (peripheral vascular disease) ([facility])     Sick sinus syndrome (HCC)     Sleep apnea     \"sleep study 3 yrs ago- could not tolerate the cpap made me too dry\"    Spinal stenosis     Teeth missing     Upper And Lower    Type 2 diabetes mellitus without complication ([facility])     WD-Chronic foot ulcer, left, with necrosis of bone ([facility]) 2021       Objective:     Vitals:    22 1137   BP: (!) 186/82   Pulse: 60   Resp: 14   Temp: 96.9 °F (36.1 °C)   SpO2:        TEMPERATURE:  Current - Temp: 96.9 °F (36.1 °C); Max - Temp  Av.1 °F (36.2 °C)  Min: 96 °F (35.6 °C)  Max: 97.6 °F (36.4 °C)    No intake/output data recorded. I/O last 3 completed shifts: In: 1423.7 [P.O.:550; I.V.:52.9; IV Piggyback:820.8]  Out: 5300 [Urine:5300]      Physical Exam:  Physical Exam  Constitutional:       Appearance: He is well-developed. HENT:      Head: Normocephalic. Eyes:      Pupils: Pupils are equal, round, and reactive to light. Cardiovascular:      Rate and Rhythm: Normal rate. Pulmonary:      Effort: Pulmonary effort is normal.   Abdominal:      General: There is no distension. Palpations: Abdomen is soft. There is no mass. Tenderness: There is no abdominal tenderness. There is no guarding or rebound.    Musculoskeletal: General: Normal range of motion. Cervical back: Normal range of motion and neck supple. Feet:    Skin:     General: Skin is warm. Neurological:      Mental Status: He is alert and oriented to person, place, and time.            Scheduled Meds:   [START ON 11/15/2022] isosorbide mononitrate  60 mg Oral Daily    heparin (porcine)  5,000 Units SubCUTAneous 3 times per day    furosemide  40 mg IntraVENous BID    empagliflozin  10 mg Oral Daily    carvedilol  6.25 mg Oral BID WC    sodium hypochlorite   Irrigation Daily    vancomycin  1,000 mg IntraVENous Q24H    aspirin  81 mg Oral Daily    atorvastatin  40 mg Oral Nightly    clopidogrel  75 mg Oral Daily    magnesium oxide  400 mg Oral BID    tiotropium  2 puff Inhalation Daily    sodium chloride flush  5-40 mL IntraVENous 2 times per day    insulin lispro  0-4 Units SubCUTAneous TID WC    insulin lispro  0-4 Units SubCUTAneous Nightly    metOLazone  2.5 mg Oral BID    collagenase   Topical Daily     Continuous Infusions:   sodium chloride      dextrose       PRN Meds:hydrALAZINE, albuterol sulfate HFA, sodium chloride flush, sodium chloride, ondansetron **OR** ondansetron, polyethylene glycol, acetaminophen **OR** acetaminophen, glucose, dextrose bolus **OR** dextrose bolus, glucagon (rDNA), dextrose      Labs/Imaging Results:   Lab Results   Component Value Date    WBC 4.7 11/14/2022    HGB 8.7 (L) 11/14/2022    HCT 28.6 (L) 11/14/2022    MCV 87.5 11/14/2022     11/14/2022     Lab Results   Component Value Date     11/14/2022    K 4.6 11/14/2022    CL 99 11/14/2022    CO2 29 11/14/2022    BUN 34 (H) 11/14/2022    CREATININE 2.3 (H) 11/14/2022    GLUCOSE 135 (H) 11/14/2022    CALCIUM 8.5 11/14/2022    PROT 6.0 (L) 11/13/2022    LABALBU 2.8 (L) 11/13/2022    BILITOT 0.6 11/13/2022    ALKPHOS 74 11/13/2022    AST 11 (L) 11/13/2022    ALT 8 (L) 11/13/2022    LABGLOM 29 (L) 11/14/2022    GFRAA 25 (L) 10/17/2022       Assessment:     Patient Active Problem List:     PAD (peripheral artery disease) ([facility])     Chronic coronary artery disease     Biventricular ICD (implantable cardioverter-defibrillator) in place     Chronic combined systolic and diastolic heart failure ([facility])     Chronic kidney disease, stage III (moderate) ([facility])     Mixed hyperlipidemia     Sick sinus syndrome ([facility])     Type 2 diabetes mellitus with diabetic polyneuropathy ([address]     Spinal stenosis of lumbar region     Obesity, Class I, BMI 30-34.9     S/P partial colectomy     Tubulovillous adenoma of colon     Microalbuminuria     WD-PVD (peripheral vascular disease) ([facility])     Limb ischemia     Necrotic toes ([facility])     Toe gangrene ([facility])     Diabetic foot infection ([address]     Chronic kidney disease (CKD) stage G3a/A2, moderately decreased glomerular filtration rate (GFR) between 45-59 mL/min/1.73 square meter and albuminuria creatinine ratio between  mg/g ([facility])     Edema     ICD (implantable cardioverter-defibrillator) battery depletion     Hyperkalemia     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[facility])     Ischemia of toe     Acute kidney injury ([address]     Fluid overload     DM (diabetes mellitus) ([facility])     Precordial pain     Acute chest pain     Unstable angina ([facility])     Chronic kidney disease (CKD) stage G3a/A3, moderately decreased glomerular filtration rate (GFR) between 45-59 mL/min/1.73 square meter and albuminuria creatinine ratio greater than 300 mg/g ([facility])     Cardiomyopathy ([facility])     Diabetic neuropathy ([facility])     HTN (hypertension)     Epigastric pain     Acute on chronic congestive heart failure (HCC)     Leg edema     Acute on chronic systolic CHF (congestive heart failure) ([facility])     Diabetic foot ulcer with osteomyelitis ([address]     Visit for wound check     Moderate malnutrition ([address]     Long term (current) use of antibiotics     WD-Diabetic ulcer of toe of right foot associated with type 2 diabetes mellitus, with fat layer exposed ([address]     Diabetic ulcer of toe associated with type 2 diabetes mellitus, with bone involvement without evidence of necrosis ([address]     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     Persistent wound pain     Receiving intravenous antibiotic treatment as outpatient     Acute on chronic respiratory failure with hypoxemia (HCC)     WD-Chronic foot ulcer, left, with necrosis of bone (HCC)     Acute on chronic HFrEF (heart failure with reduced ejection fraction) ([facility])     Scrotal edema     Hypertensive urgency     Diabetic ulcer of right foot due to type 2 diabetes mellitus ([facility])     Cellulitis of foot     Toe osteomyelitis ([facility])     Heart failure exacerbated by sotalol ([facility])     CHF (congestive heart failure), NYHA class I, acute on chronic, combined (HCC)     Acute on chronic diastolic CHF (congestive heart failure) (HCC)     Leg swelling     Acute on chronic diastolic heart failure (HCC)     Skin ulcer of left foot including toes with fat layer exposed ([address]     Superficial incisional surgical site infection        Plan:     Wound dressings changed today by myself and Dr. Jeniffer Doyle MD.    Continue daily wound cares:   - Right great toe amp site wash with soap and water, pack with Dakin's damp gauze, cover with gauze, abd, kerlix change BID and prn. Right medial ankle cleanse with NS, apply Aquacel AG, abd, kerlix change daily and prn. Left great and 2nd toe amp sites cleanse with NS, apply Santyl nickel thick, NS moist gauze, abd, kerlix change daily and prn.         Aquilino Stark PA-C

## 2022-11-14 NOTE — CARE COORDINATION
CM in to see Pt to initiate discharge planning. Pt from home with his  daughter. Pt has DME to include cane, walker, electric scooter, shower chair. Pt is active with [facility] and plans to resume at discharge. Pt has insurance, pcp, and can afford medications. Pt denies any needs at this time.      CM following

## 2022-11-14 NOTE — PROGRESS NOTES
Cardiology Progress Note     Today's Plan CPM    Admit Date:  11/11/2022    Consult reason/ Seen today for: CHF    Subjective and  Overnight Events:  up sitting on side of bed- states he is feeling better-edema is improving-has pain in bilateral feet and legs-   Oxygen requirements improved    Telemetry A paced    Assessment / Plan:     Acute on chronic decompensated HFpEF with right heart failure: severely dilated RV: RVSP 80 mmHg with severe TR: clinically improving- weight is trending down-scrotal and penile edema improving negative fluid status: on IV lasix/ metolazone:nephrology managing diuretics: continue jardiance/ coreg/Imdur  HTN-bp has improved off milrinone- starting to trend up - may need to re start hydralazine   CAD- denies cp- troponin elevated - type 2 rise- medical management- lipitor/asa/Imdur  PAD:Had PTA of left SFA 10/2022: continue ASA/ atorvastatin and plavix:   Dyslipidemia-on statin  Leg ulcers/wounds- per primary   CKD: nephology on board        History of Presenting Illness:    Chief complain on admission : 67 y. o.year old who is admitted for  Chief Complaint   Patient presents with    Leg Pain    Post-op Problem     Right foot        Past medical history:    has a past medical history of Acid reflux, Acute MI ([address] Arthritis, Broken teeth, CAD (coronary artery disease), Cardiomyopathy ([address] CHF (congestive heart failure) ([address] Chronic back pain, Chronic kidney disease, Diabetes mellitus ([address] Diabetic neuropathy ([address] H/O cardiovascular stress test, H/O Doppler ultrasound, H/O percutaneous left heart catheterization, History of irregular heartbeat, History of syncope, Hyperlipidemia, Hypertension, Leg swelling, Necrotic toes (HCC), Neuropathy, neuropathy, PAD (peripheral artery disease) ([address] PVD (peripheral vascular disease) ([address] Sick sinus syndrome ([address] Sleep apnea, Spinal stenosis, Teeth missing, Type 2 diabetes mellitus without complication ([facility] and WD-Chronic foot ulcer, left, with necrosis of bone ([facility] Past surgical history:   has a past surgical history that includes Coronary angioplasty with stent; Dental surgery; Colonoscopy (08/04/2016); pacemaker placement (06/04/2010); vascular surgery; colectomy (Right, 08/26/2016); Toe amputation (Right, 09/12/2017); Toe amputation (Right, 01/09/2018); Cardiac catheterization; Cardiac defibrillator placement (06/04/2010); Coronary angioplasty; Cardiac catheterization (07/14/2017); Cardiac catheterization (11/20/2018); Toe amputation (Left, 12/26/2020); IR TUNNELED CVC PLACE WO SQ PORT/PUMP > 5 YEARS (6/14/2021); Toe amputation (Left, 7/26/2022); and Toe amputation (Right, 10/20/2022). Social History:   reports that he quit smoking about 12 months ago. His smoking use included cigars and cigarettes. He started smoking about 42 years ago. He has a 9.00 pack-year smoking history. He has never used smokeless tobacco. He reports current drug use. Drug: Marijuana Shellye Burkitt). He reports that he does not drink alcohol. Family history:  family history includes Cancer in his brother, father, and son; Diabetes in his brother, mother, and sister; Early Death (age of onset: 62) in his sister; Heart Disease in his brother and sister; High Blood Pressure in his brother, brother, and mother; Neuropathy in his sister; Other in his sister; Stroke in his mother; Vision Loss in his mother.     Allergies   Allergen Reactions    Pcn [Penicillins] Hives    Fentanyl Itching       Review of Systems:   All 14 systems were reviewed and are negative  Except for the positive findings which are documented     BP (!) 167/72   Pulse 63   Temp (!) 96 °F (35.6 °C) (Oral)   Resp 16   Ht 6' (1.829 m)   Wt 261 lb (118.4 kg)   SpO2 94%   BMI 35.40 kg/m²     Intake/Output Summary (Last 24 hours) at 11/14/2022 1035  Last data filed at 11/14/2022 0655  Gross per 24 hour   Intake 690.48 ml   Output 3700 ml   Net -3009.52 ml       Physical Exam:  Physical Exam  Vitals reviewed. Cardiovascular:      Rate and Rhythm: Normal rate. Heart sounds: Normal heart sounds. No murmur heard. Pulmonary:      Breath sounds: No wheezing or rales. Chest:      Comments: Left sided device pocket intact  Abdominal:      Tenderness: There is no abdominal tenderness. Genitourinary:     Penis: Swelling present. Comments: Scrotal edema   Musculoskeletal:      Right lower leg: Edema present. Left lower leg: Edema present. Feet:      Comments: Bilateral feet are wrapped / dressed   Skin:     General: Skin is warm and dry. Capillary Refill: Capillary refill takes less than 2 seconds. Neurological:      Mental Status: He is alert and oriented to person, place, and time.         Medications:    heparin (porcine)  5,000 Units SubCUTAneous 3 times per day    furosemide  40 mg IntraVENous BID    empagliflozin  10 mg Oral Daily    carvedilol  6.25 mg Oral BID WC    sodium hypochlorite   Irrigation Daily    meropenem  1,000 mg IntraVENous Q12H    vancomycin  1,000 mg IntraVENous Q24H    aspirin  81 mg Oral Daily    atorvastatin  40 mg Oral Nightly    clopidogrel  75 mg Oral Daily    isosorbide mononitrate  30 mg Oral Daily    magnesium oxide  400 mg Oral BID    tiotropium  2 puff Inhalation Daily    sodium chloride flush  5-40 mL IntraVENous 2 times per day    insulin lispro  0-4 Units SubCUTAneous TID WC    insulin lispro  0-4 Units SubCUTAneous Nightly    metOLazone  2.5 mg Oral BID    collagenase   Topical Daily      sodium chloride      dextrose       albuterol sulfate HFA, sodium chloride flush, sodium chloride, ondansetron **OR** ondansetron, polyethylene glycol, acetaminophen **OR** acetaminophen, glucose, dextrose bolus **OR** dextrose bolus, glucagon (rDNA), dextrose    Lab Data:  CBC:   Recent Labs     11/12/22  0726 11/13/22  1636   WBC 4.6 4.4   HGB 8.4* 8.6*   HCT 27.9* 29.2*   MCV 88.9 88.5    167     BMP:   Recent Labs     11/11/22  1445 11/12/22  0726 11/13/22  0432    136 140   K 5.2* 5.1 5.0    104 103   CO2 28 27 25   PHOS  --  3.5 3.8   BUN 29* 31* 33*   CREATININE 2.0* 2.1* 2.4*     PT/INR: No results for input(s): PROTIME, INR in the last 72 hours. BNP:  No results for input(s): PROBNP in the last 72 hours. TROPONIN: No results for input(s): TROPONINT in the last 72 hours. Impression:  Principal Problem:    Acute on chronic diastolic heart failure (HCC)  Active Problems:    CHF (congestive heart failure), NYHA class I, acute on chronic, combined (HCC)    Acute on chronic diastolic CHF (congestive heart failure) ([facility])    Leg swelling    Skin ulcer of left foot including toes with fat layer exposed ([address]    Superficial incisional surgical site infection    Biventricular ICD (implantable cardioverter-defibrillator) in place    WD-PVD (peripheral vascular disease) ([address]  Resolved Problems:    * No resolved hospital problems. *       All labs, medications and tests reviewed by myself, continue all other medications of all above medical condition listed as is except for changes mentioned above. Thank you   Please call with questions.     Electronically signed by MICKY Concepcion CNP on 11/14/2022 at 10:35 AM

## 2022-11-14 NOTE — PROGRESS NOTES
Comprehensive Nutrition Assessment    Type and Reason for Visit:  Initial, Positive Nutrition Screen, Wound    Nutrition Recommendations/Plan:   Continue current diet  Begin wound healing supplement bid     Malnutrition Assessment:  Malnutrition Status:  Insufficient data (11/14/22 1616)    Context:  Chronic Illness       Nutrition Assessment:    Pt admitted with acute on chronic CHF and foot wounds. He is s/p amputation of the right first toe on 10/20/22. H/O coronary artery disease s/p multiple stents, CHF, CKD, DM. He is eating well here, consuming 76% to all of meals on diabetic, cardiac diet. Recent wt loss noted, partially fluid related. Will order oral supplement and continue to follow as moderate nutrition risk. Nutrition Related Findings:    BUN 34, Cr 2.3, Glu 112-169, A1c 7.1 Wound Type: Multiple, Surgical Incision (non-healing)       Current Nutrition Intake & Therapies:    Average Meal Intake: %  Average Supplements Intake: None Ordered  ADULT DIET; Regular; 5 carb choices (75 gm/meal); Low Fat/Low Chol/High Fiber/CLAUDIA; Low Sodium (2 gm); 1000 ml    Anthropometric Measures:  Height: 6' (182.9 cm)  Ideal Body Weight (IBW): 178 lbs (81 kg)    Admission Body Weight: 273 lb 5.9 oz (124 kg)  Current Body Weight: 261 lb (118.4 kg), 146.6 % IBW.     Current BMI (kg/m2): 35.4  Usual Body Weight: 295 lb (133.8 kg) (1/25/22)  % Weight Change (Calculated): -11.5                    BMI Categories: Obese Class 2 (BMI 35.0 -39.9)    Estimated Daily Nutrient Needs:  Energy Requirements Based On: Formula  Weight Used for Energy Requirements: Current  Energy (kcal/day): 2363 (MSJ)  Weight Used for Protein Requirements: Ideal  Protein (g/day):  (1.2-1.3 g/kg IBW)  Method Used for Fluid Requirements:  (1000 ml fluid restriction noted)  Fluid (ml/day): 2300    Nutrition Diagnosis:   Predicted inadequate energy intake related to increase demand for energy/nutrients as evidenced by wounds, weight loss    Nutrition Interventions:   Food and/or Nutrient Delivery: Continue Current Diet, Start Oral Nutrition Supplement  Nutrition Education/Counseling: No recommendation at this time  Coordination of Nutrition Care: Continue to monitor while inpatient       Goals:     Goals: Meet at least 75% of estimated needs       Nutrition Monitoring and Evaluation:   Behavioral-Environmental Outcomes: None Identified  Food/Nutrient Intake Outcomes: Food and Nutrient Intake, Supplement Intake  Physical Signs/Symptoms Outcomes: Biochemical Data, Chewing or Swallowing, GI Status, Fluid Status or Edema, Skin, Weight    Discharge Planning:     Too soon to determine     Natanael Kennedy, [address] [place] [address],   Contact: [phone]

## 2022-11-14 NOTE — PROGRESS NOTES
V2.0  [facility] Hospitalist Progress Note      Name:  Kathy Larsen /Age/Sex: 1950  (67 y.o. male)   MRN & CSN:  [id number] & [id number] Encounter Date/Time: 2022 8:40 AM EST    Location:  [address] [place], [place] [address] PCP: Gretta Duty Day: 3    Assessment and Plan:   Kathy Larsen is a 67 y.o. male with pmh of PAD s/p PCI of left SFA, CKD stage IIIb, hyperkalemia, foot wounds, CHF who presents with Acute on chronic diastolic heart failure (HCC)      Plan:  Acute on chronic hypoxic respiratory failure in the setting of CHF exacerbation: Known case of diastolic heart failure with preserved ejection fraction. Chest x-ray showed signs of pulmonary vascular congestion. Started on IV Lasix 80 mg 3 times daily. Cardiology on board. On telemetry diuresing well. Goal dry weight of 260 pounds. Still has to lose 10 more pounds. Strict I's and O's  Troponinemia likely demand ischemia  PAD status post PCI of the left SFA on  s/p right carotid arm protection on 10/20 on DAPT and Lipitor  CKD stage IIIb nephrology consulted avoid nephrotoxic agents continue to monitor  Hypokalemia replete as needed   Foot wounds wound care consulted, Vanc and meropenem   Type B lactic acidosis  Benign essential hypertension  Non-insulin-dependent diabetes mellitus type 2 on sliding scale insulin: Patient is not on diabetic medications please consider starting diabetic medications for the patient on discharge last hemoglobin A1c was 7.1 in 2022. Diet ADULT DIET; Regular; 5 carb choices (75 gm/meal); Low Fat/Low Chol/High Fiber/CLAUDIA; Low Sodium (2 gm); 1000 ml   DVT Prophylaxis [] Lovenox, [x]  Heparin, [] SCDs, [] Ambulation,  [] Eliquis, [] Xarelto  [] Coumadin   Code Status Full Code   Disposition From: Home Home  Expected Disposition: Home  Estimated Date of Discharge: 3 to 4 days  Patient requires continued admission due to IV diuresis still not at baseline.    Surrogate Decision Maker/ POA Subjective:     Chief Complaint: Leg Pain and Post-op Problem (Right foot)     Patient was seen and evaluated bedside earlier this morning. Patient's daughter was also present. Patient was alert oriented x3, hemodynamically stable. Patient did not have any significant overnight events. Review of Systems:    Review of Systems   Constitutional:  Positive for activity change, appetite change and unexpected weight change. Negative for chills and fever. HENT:  Negative for ear pain, hearing loss, sinus pressure, sinus pain and voice change. Eyes:  Negative for pain, discharge and visual disturbance. Respiratory:  Positive for shortness of breath and wheezing. Negative for cough and chest tightness. Cardiovascular:  Positive for leg swelling. Negative for chest pain and palpitations. Gastrointestinal:  Negative for abdominal pain, blood in stool, diarrhea, nausea and vomiting. Genitourinary:  Negative for dysuria and frequency. Musculoskeletal:  Positive for arthralgias and back pain. Neurological:  Positive for weakness. Negative for dizziness, light-headedness and headaches. Psychiatric/Behavioral:  Negative for sleep disturbance. Objective: Intake/Output Summary (Last 24 hours) at 11/13/2022 2213  Last data filed at 11/13/2022 2123  Gross per 24 hour   Intake 533.22 ml   Output 2950 ml   Net -2416.78 ml          Vitals:   Vitals:    11/13/22 2000   BP: (!) 165/77   Pulse: 65   Resp: 16   Temp: 97.6 °F (36.4 °C)   SpO2: 96%       Physical Exam:   Physical Exam  Vitals reviewed. Constitutional:       Appearance: Normal appearance. He is normal weight. HENT:      Head: Normocephalic. Nose: Nose normal.      Mouth/Throat:      Mouth: Mucous membranes are dry. Eyes:      Conjunctiva/sclera: Conjunctivae normal.      Pupils: Pupils are equal, round, and reactive to light. Cardiovascular:      Rate and Rhythm: Normal rate and regular rhythm. Pulses: Normal pulses. Heart sounds: Normal heart sounds. No murmur heard. Pulmonary:      Effort: Respiratory distress present. Breath sounds: Rales present. No wheezing or rhonchi. Abdominal:      General: Abdomen is flat. Bowel sounds are normal. There is distension. Palpations: Abdomen is soft. Tenderness: There is no abdominal tenderness. Musculoskeletal:         General: No deformity. Normal range of motion. Cervical back: Normal range of motion and neck supple. Right lower leg: Edema present. Left lower leg: Edema present. Skin:     General: Skin is dry. Coloration: Skin is not jaundiced or pale. Neurological:      General: No focal deficit present. Mental Status: He is alert and oriented to person, place, and time. Mental status is at baseline.           Medications:   Medications:    [START ON 11/14/2022] heparin (porcine)  5,000 Units SubCUTAneous 3 times per day    furosemide  40 mg IntraVENous BID    empagliflozin  10 mg Oral Daily    carvedilol  6.25 mg Oral BID WC    sodium hypochlorite   Irrigation Daily    meropenem  1,000 mg IntraVENous Q12H    vancomycin  1,000 mg IntraVENous Q24H    aspirin  81 mg Oral Daily    atorvastatin  40 mg Oral Nightly    clopidogrel  75 mg Oral Daily    isosorbide mononitrate  30 mg Oral Daily    magnesium oxide  400 mg Oral BID    tiotropium  2 puff Inhalation Daily    sodium chloride flush  5-40 mL IntraVENous 2 times per day    insulin lispro  0-4 Units SubCUTAneous TID WC    insulin lispro  0-4 Units SubCUTAneous Nightly    metOLazone  2.5 mg Oral BID    collagenase   Topical Daily      Infusions:    sodium chloride      dextrose       PRN Meds: albuterol sulfate HFA, 2 puff, Q4H PRN  oxyCODONE-acetaminophen, 1 tablet, BID PRN  sodium chloride flush, 5-40 mL, PRN  sodium chloride, , PRN  ondansetron, 4 mg, Q8H PRN   Or  ondansetron, 4 mg, Q6H PRN  polyethylene glycol, 17 g, Daily PRN  acetaminophen, 650 mg, Q6H PRN Or  acetaminophen, 650 mg, Q6H PRN  glucose, 4 tablet, PRN  dextrose bolus, 125 mL, PRN   Or  dextrose bolus, 250 mL, PRN  glucagon (rDNA), 1 mg, PRN  dextrose, , Continuous PRN      Labs      Recent Results (from the past 24 hour(s))   Culture, Skin    Collection Time: 11/13/22 12:30 AM    Specimen: Skin   Result Value Ref Range    Specimen SKIN     Special Requests       Right 1st toe amputation site wound   obtain aerobic and anaerobic     Comprehensive Metabolic Panel    Collection Time: 11/13/22  4:32 AM   Result Value Ref Range    Sodium 140 135 - 145 MMOL/L    Potassium 5.0 3.5 - 5.1 MMOL/L    Chloride 103 99 - 110 mMol/L    CO2 25 21 - 32 MMOL/L    BUN 33 (H) 6 - 23 MG/DL    Creatinine 2.4 (H) 0.9 - 1.3 MG/DL    Est, Glom Filt Rate 28 (L) >60 mL/min/1.73m2    Glucose 73 70 - 99 MG/DL    Calcium 8.2 (L) 8.3 - 10.6 MG/DL    Albumin 2.8 (L) 3.4 - 5.0 GM/DL    Total Protein 6.0 (L) 6.4 - 8.2 GM/DL    Total Bilirubin 0.6 0.0 - 1.0 MG/DL    ALT 8 (L) 10 - 40 U/L    AST 11 (L) 15 - 37 IU/L    Alkaline Phosphatase 74 40 - 128 IU/L    Anion Gap 12 4 - 16   Magnesium    Collection Time: 11/13/22  4:32 AM   Result Value Ref Range    Magnesium 1.6 (L) 1.8 - 2.4 mg/dl   Phosphorus    Collection Time: 11/13/22  4:32 AM   Result Value Ref Range    Phosphorus 3.8 2.5 - 4.9 MG/DL   POCT Glucose    Collection Time: 11/13/22  9:39 AM   Result Value Ref Range    POC Glucose 106 (H) 70 - 99 MG/DL   POCT Glucose    Collection Time: 11/13/22 12:57 PM   Result Value Ref Range    POC Glucose 143 (H) 70 - 99 MG/DL   CBC with Auto Differential    Collection Time: 11/13/22  4:36 PM   Result Value Ref Range    WBC 4.4 4.0 - 10.5 K/CU MM    RBC 3.30 (L) 4.6 - 6.2 M/CU MM    Hemoglobin 8.6 (L) 13.5 - 18.0 GM/DL    Hematocrit 29.2 (L) 42 - 52 %    MCV 88.5 78 - 100 FL    MCH 26.1 (L) 27 - 31 PG    MCHC 29.5 (L) 32.0 - 36.0 %    RDW 17.3 (H) 11.7 - 14.9 %    Platelets 598 970 - 266 K/CU MM    MPV 9.7 7.5 - 11.1 FL    Differential Type AUTOMATED DIFFERENTIAL     Segs Relative 68.3 (H) 36 - 66 %    Lymphocytes % 22.6 (L) 24 - 44 %    Monocytes % 6.8 (H) 0 - 4 %    Eosinophils % 1.6 0 - 3 %    Basophils % 0.7 0 - 1 %    Segs Absolute 3.0 K/CU MM    Lymphocytes Absolute 1.0 K/CU MM    Monocytes Absolute 0.3 K/CU MM    Eosinophils Absolute 0.1 K/CU MM    Basophils Absolute 0.0 K/CU MM    Nucleated RBC % 0.0 %    Total Nucleated RBC 0.0 K/CU MM    Total Immature Neutrophil 0.00 K/CU MM    Immature Neutrophil % 0.0 0 - 0.43 %   C-Reactive Protein    Collection Time: 11/13/22  4:36 PM   Result Value Ref Range    CRP High Sensitivity 63.3 (H) <5.0 mg/L   POCT Glucose    Collection Time: 11/13/22  6:10 PM   Result Value Ref Range    POC Glucose 163 (H) 70 - 99 MG/DL   POCT Glucose    Collection Time: 11/13/22  9:17 PM   Result Value Ref Range    POC Glucose 121 (H) 70 - 99 MG/DL        Imaging/Diagnostics Last 24 Hours   XR CHEST PORTABLE    Result Date: 11/11/2022  EXAMINATION: ONE XRAY VIEW OF THE CHEST 11/11/2022 12:26 am COMPARISON: October 16, 2022 HISTORY: ORDERING SYSTEM PROVIDED HISTORY: hypoxia TECHNOLOGIST PROVIDED HISTORY: Reason for exam:->hypoxia Reason for Exam: hypoxia FINDINGS: Left chest wall AICD in place. Heart is enlarged. Mild-to-moderate congestive heart failure is seen with trace right effusion. No pneumothorax. No acute or aggressive osseous lesion. 1. Cardiomegaly with mild-to-moderate congestive heart failure.        Electronically signed by Tangela Swift MD on 11/13/2022 at 10:13 PM

## 2022-11-14 NOTE — PROGRESS NOTES
Nephrology Progress Note  11/14/2022 2:09 PM        Subjective:   Admit Date: 11/11/2022  PCP: Susie Palacios    Interval History:  patient seen early morning, this is a late entry   he was breathing and feeling much better    Diet:  reasonable    ROS:   reported good urine output   almost 4.2 L for the last 24 hours   variable blood pressure recorded need manual confirmation, no fever      Data:     Current meds:    [START ON 11/15/2022] isosorbide mononitrate  60 mg Oral Daily    heparin (porcine)  5,000 Units SubCUTAneous 3 times per day    furosemide  40 mg IntraVENous BID    empagliflozin  10 mg Oral Daily    carvedilol  6.25 mg Oral BID WC    sodium hypochlorite   Irrigation Daily    vancomycin  1,000 mg IntraVENous Q24H    aspirin  81 mg Oral Daily    atorvastatin  40 mg Oral Nightly    clopidogrel  75 mg Oral Daily    magnesium oxide  400 mg Oral BID    tiotropium  2 puff Inhalation Daily    sodium chloride flush  5-40 mL IntraVENous 2 times per day    insulin lispro  0-4 Units SubCUTAneous TID WC    insulin lispro  0-4 Units SubCUTAneous Nightly    metOLazone  2.5 mg Oral BID    collagenase   Topical Daily      sodium chloride      dextrose           I/O last 3 completed shifts: In: 1423.7 [P.O.:550;  I.V.:52.9; IV Piggyback:820.8]  Out: 5300 [Urine:5300]    CBC:   Recent Labs     11/12/22  0726 11/13/22  1636 11/14/22  0955   WBC 4.6 4.4 4.7   HGB 8.4* 8.6* 8.7*    167 167          Recent Labs     11/12/22  0726 11/13/22  0432 11/14/22  0955    140 139   K 5.1 5.0 4.6    103 99   CO2 27 25 29   BUN 31* 33* 34*   CREATININE 2.1* 2.4* 2.3*   GLUCOSE 113* 73 135*       Lab Results   Component Value Date    CALCIUM 8.5 11/14/2022    PHOS 3.8 11/13/2022       Objective:     Vitals: BP (!) 186/82   Pulse 60   Temp 96.9 °F (36.1 °C) (Oral)   Resp 14   Ht 6' (1.829 m)   Wt 261 lb (118.4 kg)   SpO2 94%   BMI 35.40 kg/m² ,    General appearance:   he was alert, awake and oriented  HEENT:   positive conjunctival pallor  Neck:   seemed supple  Lungs:   positive interventions breath sound, chest wall implanted cardiac device  Heart:   center regular rate and rhythm  Abdomen:  soft  Extremities:   grade edema along with scrotal edema is better   he also have chronic bilateral leg wound      Problem List :         Impression :      acute kidney injury with underlying CKD stage G3 B A3- increase urine output- some urine output from initial glycosuria from SGLT2 inhibitor with associated sodium metabolic loss- usually  goes down and we could to- is also on loop   fluid overload doing much better now   underlying diabetes, atherosclerotic cardiovascular disease with proteinuria, as well as leg wound   also other chronic condition- including diabetes    Recommendation/Plan  :      I definitely keep SGLT2 inhibitors   probably okay to discharge tomorrow with torsemide  50 mg twice daily along with 2.5 mg metolazone   low-salt, DASH diet   good glycemic control   follow clinically      Jami Otero MD MD

## 2022-11-14 NOTE — CONSULTS
Infectious Disease Consult Note  2022   Patient Name: Cruz Vazquez : 1950     Assessment  Enterococcus faecalis bacteremia  Medical history of peripheral arterial disease, status post right hallux amputation on 10/20/2022. The wound is yet to heal.  1 out of 2 sets of blood cultures positive for Enterococcus faecalis bacteremia. It could be a contaminant, however, given the presence of the wound, right-sided heart failure, and the presence of pacemaker cardiac implantable electronic device endocarditis will need to be considered  Possible right foot osteomyelitis  Nonhealing wound. Penicillin allergy  Reported as hives; he is uncertain about when this occurred. Penicillin will be avoided. He tolerates cephalosporins and carbapenems  Diastolic CHF  Right-sided heart failure  Pacemaker  Comorbid conditions: Diabetes mellitus with diabetic polyneuropathy, CKD stage III, obesity, history of diabetic foot ulcers    Plan  Therapeutic: Discontinue meropenem. Continue intravenous vancomycin  Diagnostic: Trend CRP and procalcitonin  F/u:  Other: Cardiology consult for HENRI    Thank you for allowing me to consult in the care of this patient.  ------------------------  REASON FOR CONSULT: \"E. [place] bacteremia\"   Requested by: Dr. Francois Lc is a 67 y.o. male who was admitted 2022 for further evaluation and management of postoperative wound infection. He was recently discharged from the hospital on 10/24/2022 after a 13-day stay for CHF exacerbation. During that admission was diagnosed with ischemic right first toe with ulceration; he underwent a right hallux amputation at the tarsometatarsal joint on 10/20/2022. The pathology report stated ulceration with acute inflammation and reactive fibrosis, focal acute osteomyelitis (no culture was sent).   On 10/18/2022 he underwent selective angiogram of the left lower extremity, selective angiogram of the right lower extremity and angioplasty of the left superficial femoral artery. On this admission he reports that he bumped his foot and his wound opened. Received a clinical diagnosis of acute on chronic hypoxic respiratory failure; CHF exacerbation. On admission he had a low-grade fever 100.1 °F.  1 out of 4 blood culture bottles drawn on 11/11/2022 was positive for Enterococcus faecalis. Received a dose of ceftriaxone on admission. He is presently receiving vancomycin and meropenem. Wound culture was taken. ? Infectious diseases service was consulted to evaluate the pt, and recommend further investigative and therapeutic measures. Review and summary of old records:  ROS: Other systems reviewed Including eyes, ENT, respiratory, cardiovascular, GI, , dermatologic, neurologic, psych, hem/lymphatic, musculoskeletal and endocrine were negative other than what is mentioned above.      Patient Active Problem List    Diagnosis Date Noted    Precordial pain 07/18/2018    Acute chest pain     Skin ulcer of left foot including toes with fat layer exposed ([address] 11/12/2022    Superficial incisional surgical site infection 11/12/2022    Acute on chronic diastolic heart failure ([address] 11/11/2022    Acute on chronic diastolic CHF (congestive heart failure) ([address] 10/14/2022    Leg swelling 10/14/2022    Heart failure exacerbated by sotalol ([address] 10/12/2022    CHF (congestive heart failure), NYHA class I, acute on chronic, combined ([address] 10/12/2022    Cellulitis of foot 07/26/2022    Toe osteomyelitis ([address] 07/26/2022    Diabetic ulcer of right foot due to type 2 diabetes mellitus ([address] 07/25/2022    Acute on chronic HFrEF (heart failure with reduced ejection fraction) ([address] 01/22/2022    Scrotal edema 01/22/2022    Hypertensive urgency 01/22/2022    WD-Chronic foot ulcer, left, with necrosis of bone ([address] 11/12/2021    Acute on chronic respiratory failure with hypoxemia ([address] 10/29/2021    Receiving intravenous antibiotic treatment as outpatient 07/18/2021 Persistent wound pain     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     WD-Diabetic ulcer of toe of right foot associated with type 2 diabetes mellitus, with fat layer exposed ([facility] 06/18/2021    Diabetic ulcer of toe associated with type 2 diabetes mellitus, with bone involvement without evidence of necrosis ([address] 06/18/2021    Long term (current) use of antibiotics 06/14/2021    Moderate malnutrition ([address] 06/08/2021    Visit for wound check     Diabetic foot ulcer with osteomyelitis ([address] 06/07/2021    Acute on chronic systolic CHF (congestive heart failure) ([address] 12/21/2020    Leg edema     Acute on chronic congestive heart failure ([address] 12/10/2020    Epigastric pain 08/12/2020    HTN (hypertension) 05/20/2019    Diabetic neuropathy ([facility] 05/02/2019    Chronic kidney disease (CKD) stage G3a/A3, moderately decreased glomerular filtration rate (GFR) between 45-59 mL/min/1.73 square meter and albuminuria creatinine ratio greater than 300 mg/g ([facility] 01/29/2019    Cardiomyopathy ([facility] 01/29/2019    Unstable angina ([address] 11/19/2018    Acute kidney injury ([facility] 02/09/2018    Fluid overload 02/09/2018    DM (diabetes mellitus) ([facility] 02/09/2018    Ischemia of toe     Hyperkalemia 01/09/2018    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[address]     ICD (implantable cardioverter-defibrillator) battery depletion 10/11/2017    Chronic kidney disease (CKD) stage G3a/A2, moderately decreased glomerular filtration rate (GFR) between 45-59 mL/min/1.73 square meter and albuminuria creatinine ratio between  mg/g ([address] 10/06/2017    Edema 10/06/2017    Diabetic foot infection ([address]     Toe gangrene ([address] 07/26/2017    Necrotic toes ([address] 07/06/2017    WD-PVD (peripheral vascular disease) ([facility]     Limb ischemia     Microalbuminuria 01/22/2017    Tubulovillous adenoma of colon 10/20/2016    S/P partial colectomy 08/27/2016    Chronic coronary artery disease 05/31/2016    Chronic kidney disease, stage III (moderate) ([facility] 05/31/2016    Sick sinus syndrome ([facility] 05/31/2016 Type 2 diabetes mellitus with diabetic polyneuropathy ([facility] 05/31/2016    Spinal stenosis of lumbar region 05/31/2016    Obesity, Class I, BMI 30-34.9 05/31/2016    Chronic combined systolic and diastolic heart failure ([address] 03/03/2016    Biventricular ICD (implantable cardioverter-defibrillator) in place 09/03/2015    Mixed hyperlipidemia 04/13/2015    PAD (peripheral artery disease) ([address] 07/24/2012     Past Medical History:   Diagnosis Date    Acid reflux     Acute MI ([address] 2004, 2008    Arthritis     Back    Broken teeth     Upper Front    CAD (coronary artery disease)     Sees Dr. Mirtha Chiu [facility])     per old chart    CHF (congestive heart failure) ([facility])     Chronic back pain     Chronic kidney disease     STAGE 3 KIDNEY FAILURE- \"from my diabetes- do not follow with any one- have seen Dr Vivien Khoury in the past\"    Diabetes mellitus ([facility] Dx 1965    per old chart pt has been diabetic since age 13    Diabetic neuropathy ([facility]     \"in my feet\"    H/O cardiovascular stress test 08/25/2016    H/O Doppler ultrasound 09/27/2018    Moderate disease of the right lower extremity with an JALEN of 0.72. Moderate to severe disease of the left lower extremity with an JALEN of 0.55.     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    Neuropathy     both feet    neuropathy     PAD (peripheral artery disease) ([facility] 09/27/2018    PVD (peripheral vascular disease) ([facility]     Sick sinus syndrome (HCC)     Sleep apnea     \"sleep study 3 yrs ago- could not tolerate the cpap made me too dry\"    Spinal stenosis     Teeth missing     Upper And Lower    Type 2 diabetes mellitus without complication ([facility]     WD-Chronic foot ulcer, left, with necrosis of bone ([facility] 11/12/2021      Past Surgical History:   Procedure Laterality Date    CARDIAC CATHETERIZATION      per old chart done 10/2014    CARDIAC CATHETERIZATION  07/14/2017    with angiography of leg    CARDIAC CATHETERIZATION  11/20/2018    PATENT STENTS OF ALL THREE MAJOR VESSELS    CARDIAC DEFIBRILLATOR PLACEMENT  06/04/2010    [employer] [address] DR Defibrillator Implanted    COLECTOMY Right 08/26/2016    laparascopic; robotic assisted    COLONOSCOPY  08/04/2016    CORONARY ANGIOPLASTY      \"15 Heart Stents\"    CORONARY ANGIOPLASTY WITH STENT PLACEMENT      per old chart had angio with stent to circumflex and obtuse marginal artery at [facility] 5/2010( old chart also gives hx of stent placement done 2000,2004 and 2005)    DENTAL SURGERY      Teeth Extracted In Past    IR TUNNELED CATHETER PLACEMENT GREATER THAN 5 YEARS  6/14/2021    IR TUNNELED CATHETER PLACEMENT GREATER THAN 5 YEARS 6/14/2021 [facility] SPECIAL PROCEDURES    PACEMAKER PLACEMENT  06/04/2010    [employer] [address] DR Defibrillator Implanted    TOE AMPUTATION Right 09/12/2017    Rt 3rd toe    TOE AMPUTATION Right 01/09/2018     Right 5th toe amputation and Toenails trimmed left 2,3,4 and 5th toes    TOE AMPUTATION Left 12/26/2020    LEFT GREAT TOE AMPUTATION performed by Yury Romeo MD at [address] Left 7/26/2022    LEFT SECOND TOE AMPUTATION performed by Bradley Ashton MD at [address] Right 10/20/2022    Right First TOE AMPUTATION performed by Bradley Ashton MD at [address] [place] [address]      per old chart had balloon angioplasty right superfical femoral artery,right popliteal artery,,right ant.tibial artery, right tibioperoneal trunk, and right post.tibial artery wna stent placement right popliteal artery and superfical femoral artery 7/2012      Family History   Problem Relation Age of Onset    Diabetes Mother     Stroke Mother     High Blood Pressure Mother     Vision Loss Mother     Cancer Father         Prostate Cancer    Diabetes Sister     Neuropathy Sister     Other Sister         \"Breathing Problems\"    Heart Disease Sister     Early Death Sister 62        Heart Complications    Cancer Brother         \"Stomach Cancer\"    High Blood Pressure Brother     Diabetes Brother     Heart Disease Brother     High Blood Pressure Brother     Cancer Son         \"Testicle Cancer\"      Infectious disease related family history - not contibutory. SOCIAL HISTORY  Social History     Tobacco Use    Smoking status: Former     Packs/day: 0.25     Years: 36.00     Pack years: 9.00     Types: Cigars, Cigarettes     Start date: 1980     Quit date: 10/22/2021     Years since quittin.0    Smokeless tobacco: Never    Tobacco comments:     Client states he has stopped smoking   Substance Use Topics    Alcohol use: No      Ancestry: Black     ? ALLERGIES  Allergies   Allergen Reactions    Pcn [Penicillins] Hives    Fentanyl Itching      MEDICATIONS  Reviewed and are per the chart/EMR. IMMUNIZATION HISTORY  Immunization History   Administered Date(s) Administered    COVID-19, PFIZER PURPLE top, DILUTE for use, (age 15 y+), 30mcg/0.3mL 2021, 2021    Influenza Vaccine, unspecified formulation 2016, 2017    Influenza Virus Vaccine 10/01/2010, 10/15/2012, 2017, 10/25/2018    Influenza Whole 2016    Influenza, FLUARIX, FLULAVAL, FLUZONE (age 10 mo+) AND AFLURIA, (age 1 y+), PF, 0.5mL 2020    Influenza, High Dose (Fluzone 65 yrs and older) 2018    Pneumococcal Conjugate 13-valent ([id number]) 2016    Pneumococcal Polysaccharide ([id number]) 10/01/2010, 07/10/2017     ?   Antibiotics:   Ceftriaxone  Vancomycin  Meropenem  ?  -------------------------------------------------------------------------------------------------------------------    Vital Signs:  Vitals:    22 0915   BP: Pulse: 63   Resp: 16   Temp:    SpO2:          Exam:    VS: noted; wt 118.4 kg  Gen: alert and oriented X3, no distress  Skin: no stigmata of endocarditis  Wounds: Bilateral feet dressing C/D/I  HEMT: AT/NC Oropharynx pink, moist, and without lesions or exudates; dentition in poor state of repair  Eyes: PERRLA, EOMI, conjunctiva pink, sclera anicteric. Neck: Supple. Trachea midline. No LAD. Chest: no distress and CTA. Good air movement. Heart: RRR and no MRG. Abd: soft, non-distended, no tenderness, no hepatomegaly. Normoactive bowel sounds. Ext: no clubbing, cyanosis, or edema  Catheter Site: without erythema or tenderness  LDA:   Neuro: Mental status intact. CN 2-12 intact and no focal sensory or motor deficits    ? Diagnostic Studies: reviewed  ? ? I have examined this patient and available medical records on this date and have made the above observations, conclusions and recommendations.   Electronically signed by: Electronically signed by Sariah Gonzalez MD on 11/14/2022 at 10:39 AM

## 2022-11-14 NOTE — PLAN OF CARE
Problem: Discharge Planning  Goal: Discharge to home or other facility with appropriate resources  11/14/2022 0633 by Giancarlo Velazquez RN  Outcome: Progressing  11/14/2022 0632 by Giancarlo Velazquez RN  Outcome: Progressing     Problem: Safety - Adult  Goal: Free from fall injury  11/14/2022 0633 by Giancarlo Velazquez RN  Outcome: Progressing  11/14/2022 0632 by Giancarlo Velazquez RN  Outcome: Progressing     Problem: ABCDS Injury Assessment  Goal: Absence of physical injury  11/14/2022 0633 by Giancarlo Velazquez RN  Outcome: Progressing  11/14/2022 0632 by Giancarlo Velazquez RN  Outcome: Progressing     Problem: Skin/Tissue Integrity  Goal: Absence of new skin breakdown  Description: 1. Monitor for areas of redness and/or skin breakdown  2. Assess vascular access sites hourly  3. Every 4-6 hours minimum:  Change oxygen saturation probe site  4. Every 4-6 hours:  If on nasal continuous positive airway pressure, respiratory therapy assess nares and determine need for appliance change or resting period.   11/14/2022 0531 by Giancarlo Velazquez RN  Outcome: Progressing  11/14/2022 0632 by Giancarlo Velazquez RN  Outcome: Progressing     Problem: Chronic Conditions and Co-morbidities  Goal: Patient's chronic conditions and co-morbidity symptoms are monitored and maintained or improved  11/14/2022 0633 by Giancarlo Velazquez RN  Outcome: Progressing  11/14/2022 0632 by Giancarlo Velazquez RN  Outcome: Progressing     Problem: Pain  Goal: Verbalizes/displays adequate comfort level or baseline comfort level  11/14/2022 0633 by Giancarlo Velazquez RN  Outcome: Progressing  11/14/2022 0632 by Giancarlo Velazquez RN  Outcome: Progressing     Problem: Musculoskeletal - Adult  Goal: Return mobility to safest level of function  11/14/2022 0633 by Giancarlo Velazquez RN  Outcome: Progressing  11/14/2022 0632 by Giancarlo Velazquez RN  Outcome: Progressing  Goal: Return ADL status to a safe level of function  11/14/2022 0633 by Giancarlo Velazquez RN  Outcome: Progressing  11/14/2022 0443 by Haleigh Sherwood RN  Outcome: Progressing     Problem: Infection - Adult  Goal: Absence of infection at discharge  11/14/2022 9436 by Haleigh Sherwood RN  Outcome: Progressing  11/14/2022 0632 by Haleigh Sherwood RN  Outcome: Progressing     Problem: Metabolic/Fluid and Electrolytes - Adult  Goal: Electrolytes maintained within normal limits  11/14/2022 0633 by Haleigh Sherwood RN  Outcome: Progressing  11/14/2022 0632 by Haleigh Sherwood RN  Outcome: Progressing

## 2022-11-14 NOTE — PROGRESS NOTES
[facility]  consulted by Dr. Thomas Tabares  for monitoring and adjustment. Indication for treatment: Vancomycin indication: SSTI with risk factors   Goal trough: Trough Goal: 10-15 mcg/mL  AUC/RJ: <500    Risk Factors for MRSA Identified:   Hospitalization within the past 90 days, Received IV antibiotics within the past 90 days    Pertinent Laboratory Values:   Temp Readings from Last 3 Encounters:   11/14/22 97 °F (36.1 °C) (Oral)   10/24/22 98.2 °F (36.8 °C) (Oral)   08/23/22 97.2 °F (36.2 °C) (Temporal)     Recent Labs     11/12/22  0726 11/13/22  1636 11/14/22  0955   WBC 4.6 4.4 4.7       Recent Labs     11/12/22  0726 11/13/22  0432 11/14/22  0955   BUN 31* 33* 34*   CREATININE 2.1* 2.4* 2.3*       Estimated Creatinine Clearance: 39 mL/min (A) (based on SCr of 2.3 mg/dL (H)). Intake/Output Summary (Last 24 hours) at 11/14/2022 1658  Last data filed at 11/14/2022 1519  Gross per 24 hour   Intake 690.48 ml   Output 3100 ml   Net -2409.52 ml         Pertinent Cultures:   Date    Source    Results  10/12              Surgical sp   Pseudomonas, Alcaligenes Faecalis  11/11   Blood    Ordered  11/13   MRSA Nasal   Ordered      Vancomycin level:   TROUGH:  No results for input(s): VANCOTROUGH in the last 72 hours. RANDOM:  No results for input(s): VANCORANDOM in the last 72 hours. Assessment:  HPI: A 67 y.o male who presents with concern for post-operative wound. He accidentally bumped his right foot and it started to bleed through his dressings. He was recently admitted to the hospital and had his right great toe and two toes of his left foot amputated. SCr, BUN, and urine output: CKD   Day(s) of therapy: 2 of 7  Vancomycin concentration: Pending    Plan:  Received vancomycin 2000 mg x 1 on 11/11.   Continue vancomycin 1000 mg IVPB q24h  Predicted trough of 13.5 and AUC < 500  Pharmacy will continue to monitor patient and adjust therapy as indicated    VANCOMYCIN CONCENTRATION SCHEDULED FOR 11/15 @0600    Thank you for the consult,  Dior Ward [facility] [place], PharmD  11/14/2022 4:58 PM

## 2022-11-14 NOTE — PLAN OF CARE
Problem: Discharge Planning  Goal: Discharge to home or other facility with appropriate resources  11/14/2022 1529 by Americo Gan RN  Outcome: Progressing  11/14/2022 0633 by Luiz Albrecht RN  Outcome: Progressing  11/14/2022 0632 by Luiz Albrecht RN  Outcome: Progressing     Problem: Safety - Adult  Goal: Free from fall injury  11/14/2022 1529 by Americo Gan RN  Outcome: Progressing  11/14/2022 0633 by Luiz Albrecht RN  Outcome: Progressing  11/14/2022 0632 by Luiz Albrecht RN  Outcome: Progressing     Problem: ABCDS Injury Assessment  Goal: Absence of physical injury  11/14/2022 1529 by Americo Gan RN  Outcome: Progressing  11/14/2022 0633 by Luiz Albrecht RN  Outcome: Progressing  11/14/2022 0632 by Luiz Albrecht RN  Outcome: Progressing     Problem: Skin/Tissue Integrity  Goal: Absence of new skin breakdown  Description: 1. Monitor for areas of redness and/or skin breakdown  2. Assess vascular access sites hourly  3. Every 4-6 hours minimum:  Change oxygen saturation probe site  4. Every 4-6 hours:  If on nasal continuous positive airway pressure, respiratory therapy assess nares and determine need for appliance change or resting period.   11/14/2022 1529 by Americo Gan RN  Outcome: Progressing  11/14/2022 1036 by Luiz Albrecht RN  Outcome: Progressing  11/14/2022 1879 by Luiz Albrecht RN  Outcome: Progressing     Problem: Chronic Conditions and Co-morbidities  Goal: Patient's chronic conditions and co-morbidity symptoms are monitored and maintained or improved  11/14/2022 1529 by Americo Gan RN  Outcome: Progressing  11/14/2022 0633 by Luiz Albrecht RN  Outcome: Progressing  11/14/2022 0632 by Luiz Albrecht RN  Outcome: Progressing     Problem: Pain  Goal: Verbalizes/displays adequate comfort level or baseline comfort level  11/14/2022 1529 by Americo Gan RN  Outcome: Progressing  Flowsheets (Taken 11/14/2022 0809)  Verbalizes/displays adequate comfort level or baseline comfort level:   Encourage patient to monitor pain and request assistance   Assess pain using appropriate pain scale   Administer analgesics based on type and severity of pain and evaluate response   Implement non-pharmacological measures as appropriate and evaluate response   Consider cultural and social influences on pain and pain management   Notify Licensed Independent Practitioner if interventions unsuccessful or patient reports new pain  11/14/2022 0633 by Amil Gosselin, RN  Outcome: Progressing  11/14/2022 0632 by Amil Gosselin, RN  Outcome: Progressing     Problem: Respiratory - Adult  Goal: Achieves optimal ventilation and oxygenation  11/14/2022 1529 by Trisha Meneses RN  Outcome: Progressing  11/14/2022 0633 by Amil Gosselin, RN  Outcome: Progressing  11/14/2022 0632 by Amil Gosselin, RN  Outcome: Progressing     Problem: Musculoskeletal - Adult  Goal: Return mobility to safest level of function  11/14/2022 1529 by Trisha Meneses RN  Outcome: Progressing  11/14/2022 0633 by Amil Gosselin, RN  Outcome: Progressing  11/14/2022 0632 by Amil Gosselin, RN  Outcome: Progressing  Goal: Return ADL status to a safe level of function  11/14/2022 1529 by Trisha Meneses RN  Outcome: Progressing  11/14/2022 0633 by Amil Gosselin, RN  Outcome: Progressing  11/14/2022 0632 by Amil Gosselin, RN  Outcome: Progressing     Problem: Infection - Adult  Goal: Absence of infection at discharge  11/14/2022 1529 by Trisha Meneses RN  Outcome: Progressing  11/14/2022 0633 by Amil Gosselin, RN  Outcome: Progressing  11/14/2022 0632 by Amil Gosselin, RN  Outcome: Progressing     Problem: Metabolic/Fluid and Electrolytes - Adult  Goal: Electrolytes maintained within normal limits  11/14/2022 1529 by Trisha Meneses RN  Outcome: Progressing  11/14/2022 0633 by Amil Gosselin, RN  Outcome: Progressing  11/14/2022 0632 by Amil Gosselin, RN  Outcome: Progressing

## 2022-11-15 LAB
ALBUMIN SERPL-MCNC: 3.3 GM/DL (ref 3.4–5)
ALP BLD-CCNC: 90 IU/L (ref 40–129)
ALT SERPL-CCNC: 7 U/L (ref 10–40)
ANION GAP SERPL CALCULATED.3IONS-SCNC: 7 MMOL/L (ref 4–16)
AST SERPL-CCNC: 11 IU/L (ref 15–37)
BILIRUB SERPL-MCNC: 0.9 MG/DL (ref 0–1)
BUN BLDV-MCNC: 35 MG/DL (ref 6–23)
C-REACTIVE PROTEIN, HIGH SENSITIVITY: 46.2 MG/L
CALCIUM SERPL-MCNC: 8.7 MG/DL (ref 8.3–10.6)
CHLORIDE BLD-SCNC: 99 MMOL/L (ref 99–110)
CO2: 32 MMOL/L (ref 21–32)
CREAT SERPL-MCNC: 2.3 MG/DL (ref 0.9–1.3)
CULTURE: NORMAL
DOSE AMOUNT: NORMAL
DOSE TIME: NORMAL
GFR SERPL CREATININE-BSD FRML MDRD: 29 ML/MIN/1.73M2
GLUCOSE BLD-MCNC: 146 MG/DL (ref 70–99)
GLUCOSE BLD-MCNC: 152 MG/DL (ref 70–99)
GLUCOSE BLD-MCNC: 161 MG/DL (ref 70–99)
GLUCOSE BLD-MCNC: 174 MG/DL (ref 70–99)
GLUCOSE BLD-MCNC: 243 MG/DL (ref 70–99)
LV EF: 53 %
LVEF MODALITY: NORMAL
Lab: NORMAL
POTASSIUM SERPL-SCNC: 4.4 MMOL/L (ref 3.5–5.1)
SODIUM BLD-SCNC: 138 MMOL/L (ref 135–145)
SPECIMEN: NORMAL
TOTAL PROTEIN: 7 GM/DL (ref 6.4–8.2)
VANCOMYCIN RANDOM: 23.3 UG/ML

## 2022-11-15 PROCEDURE — 80053 COMPREHEN METABOLIC PANEL: CPT

## 2022-11-15 PROCEDURE — 36415 COLL VENOUS BLD VENIPUNCTURE: CPT

## 2022-11-15 PROCEDURE — 6360000002 HC RX W HCPCS: Performed by: INTERNAL MEDICINE

## 2022-11-15 PROCEDURE — 6360000002 HC RX W HCPCS: Performed by: STUDENT IN AN ORGANIZED HEALTH CARE EDUCATION/TRAINING PROGRAM

## 2022-11-15 PROCEDURE — 6370000000 HC RX 637 (ALT 250 FOR IP): Performed by: STUDENT IN AN ORGANIZED HEALTH CARE EDUCATION/TRAINING PROGRAM

## 2022-11-15 PROCEDURE — 93312 ECHO TRANSESOPHAGEAL: CPT

## 2022-11-15 PROCEDURE — 2140000000 HC CCU INTERMEDIATE R&B

## 2022-11-15 PROCEDURE — 82962 GLUCOSE BLOOD TEST: CPT

## 2022-11-15 PROCEDURE — 99233 SBSQ HOSP IP/OBS HIGH 50: CPT | Performed by: INTERNAL MEDICINE

## 2022-11-15 PROCEDURE — 2580000003 HC RX 258: Performed by: SURGERY

## 2022-11-15 PROCEDURE — 6370000000 HC RX 637 (ALT 250 FOR IP): Performed by: INTERNAL MEDICINE

## 2022-11-15 PROCEDURE — 80202 ASSAY OF VANCOMYCIN: CPT

## 2022-11-15 PROCEDURE — 93312 ECHO TRANSESOPHAGEAL: CPT | Performed by: INTERNAL MEDICINE

## 2022-11-15 PROCEDURE — 94761 N-INVAS EAR/PLS OXIMETRY MLT: CPT

## 2022-11-15 PROCEDURE — 7100000000 HC PACU RECOVERY - FIRST 15 MIN

## 2022-11-15 PROCEDURE — 94640 AIRWAY INHALATION TREATMENT: CPT

## 2022-11-15 PROCEDURE — 7100000001 HC PACU RECOVERY - ADDTL 15 MIN

## 2022-11-15 PROCEDURE — 6360000002 HC RX W HCPCS: Performed by: SURGERY

## 2022-11-15 PROCEDURE — 2580000003 HC RX 258: Performed by: STUDENT IN AN ORGANIZED HEALTH CARE EDUCATION/TRAINING PROGRAM

## 2022-11-15 PROCEDURE — 2580000003 HC RX 258: Performed by: INTERNAL MEDICINE

## 2022-11-15 RX ORDER — HYDRALAZINE HYDROCHLORIDE 20 MG/ML
5 INJECTION INTRAMUSCULAR; INTRAVENOUS ONCE
Status: COMPLETED | OUTPATIENT
Start: 2022-11-15 | End: 2022-11-15

## 2022-11-15 RX ORDER — HYDROXYZINE HYDROCHLORIDE 25 MG/1
25 TABLET, FILM COATED ORAL 3 TIMES DAILY PRN
Status: DISCONTINUED | OUTPATIENT
Start: 2022-11-15 | End: 2022-11-18 | Stop reason: HOSPADM

## 2022-11-15 RX ORDER — CARVEDILOL 25 MG/1
25 TABLET ORAL 2 TIMES DAILY WITH MEALS
Status: DISCONTINUED | OUTPATIENT
Start: 2022-11-15 | End: 2022-11-18 | Stop reason: HOSPADM

## 2022-11-15 RX ORDER — AMLODIPINE BESYLATE 10 MG/1
10 TABLET ORAL DAILY
Status: DISCONTINUED | OUTPATIENT
Start: 2022-11-15 | End: 2022-11-18 | Stop reason: HOSPADM

## 2022-11-15 RX ADMIN — MEROPENEM 1000 MG: 1 INJECTION, POWDER, FOR SOLUTION INTRAVENOUS at 16:03

## 2022-11-15 RX ADMIN — ISOSORBIDE MONONITRATE 60 MG: 30 TABLET, EXTENDED RELEASE ORAL at 12:28

## 2022-11-15 RX ADMIN — MAGNESIUM OXIDE TAB 400 MG (241.3 MG ELEMENTAL MG) 400 MG: 400 (241.3 MG) TAB at 12:28

## 2022-11-15 RX ADMIN — ASPIRIN 81 MG CHEWABLE TABLET 81 MG: 81 TABLET CHEWABLE at 12:29

## 2022-11-15 RX ADMIN — HYDRALAZINE HYDROCHLORIDE 5 MG: 20 INJECTION INTRAMUSCULAR; INTRAVENOUS at 11:47

## 2022-11-15 RX ADMIN — METOLAZONE 2.5 MG: 2.5 TABLET ORAL at 19:59

## 2022-11-15 RX ADMIN — HEPARIN SODIUM 5000 UNITS: 5000 INJECTION INTRAVENOUS; SUBCUTANEOUS at 15:50

## 2022-11-15 RX ADMIN — Medication: at 12:27

## 2022-11-15 RX ADMIN — TIOTROPIUM BROMIDE INHALATION SPRAY 2 PUFF: 3.12 SPRAY, METERED RESPIRATORY (INHALATION) at 08:44

## 2022-11-15 RX ADMIN — ONDANSETRON 4 MG: 2 INJECTION INTRAMUSCULAR; INTRAVENOUS at 13:26

## 2022-11-15 RX ADMIN — ATORVASTATIN CALCIUM 40 MG: 40 TABLET, FILM COATED ORAL at 19:59

## 2022-11-15 RX ADMIN — EMPAGLIFLOZIN 10 MG: 10 TABLET, FILM COATED ORAL at 12:28

## 2022-11-15 RX ADMIN — CARVEDILOL 12.5 MG: 6.25 TABLET, FILM COATED ORAL at 12:28

## 2022-11-15 RX ADMIN — COLLAGENASE SANTYL: 250 OINTMENT TOPICAL at 12:27

## 2022-11-15 RX ADMIN — AMLODIPINE BESYLATE 10 MG: 10 TABLET ORAL at 12:29

## 2022-11-15 RX ADMIN — SODIUM CHLORIDE, PRESERVATIVE FREE 5 ML: 5 INJECTION INTRAVENOUS at 02:25

## 2022-11-15 RX ADMIN — CARVEDILOL 25 MG: 25 TABLET, FILM COATED ORAL at 18:44

## 2022-11-15 RX ADMIN — CLOPIDOGREL BISULFATE 75 MG: 75 TABLET ORAL at 12:28

## 2022-11-15 RX ADMIN — SODIUM CHLORIDE, PRESERVATIVE FREE 5 ML: 5 INJECTION INTRAVENOUS at 20:02

## 2022-11-15 RX ADMIN — MAGNESIUM OXIDE TAB 400 MG (241.3 MG ELEMENTAL MG) 400 MG: 400 (241.3 MG) TAB at 19:59

## 2022-11-15 RX ADMIN — FUROSEMIDE 40 MG: 10 INJECTION, SOLUTION INTRAMUSCULAR; INTRAVENOUS at 15:52

## 2022-11-15 RX ADMIN — VANCOMYCIN HYDROCHLORIDE 1000 MG: 1 INJECTION, POWDER, LYOPHILIZED, FOR SOLUTION INTRAVENOUS at 02:26

## 2022-11-15 RX ADMIN — METOLAZONE 2.5 MG: 2.5 TABLET ORAL at 12:28

## 2022-11-15 RX ADMIN — FUROSEMIDE 40 MG: 10 INJECTION, SOLUTION INTRAMUSCULAR; INTRAVENOUS at 18:44

## 2022-11-15 RX ADMIN — SODIUM CHLORIDE, PRESERVATIVE FREE 10 ML: 5 INJECTION INTRAVENOUS at 12:31

## 2022-11-15 ASSESSMENT — PAIN SCALES - GENERAL
PAINLEVEL_OUTOF10: 5
PAINLEVEL_OUTOF10: 0
PAINLEVEL_OUTOF10: 0

## 2022-11-15 NOTE — PROGRESS NOTES
Cardiology Progress Note     Admit Date:  11/11/2022    Consult reason/ Seen today for :       Subjective and  Overnight Events : Blood cultures were positive for Enterococcus   leg swelling is improved, Bp is higher today after stopping milrinone was stopped         Chief complain on admission : 67 y. o.year old who is admitted for  Chief Complaint   Patient presents with    Leg Pain    Post-op Problem     Right foot      Assessment / Plan:  Enterococcus bacteremia we will plan HENRI in a.m. keep n.p.o. after midnight  Echo is concerning for right heart failure with dilated right side and RVSP in 80s bilateral lower extremity swelling and scrotal swelling  Continue  Lasix to 80 Mg tid he is in renal failure probably due to venous renal congestion  History of coronary artery disease stable  CHF: Heart failure with preserved EF abnormal troponin in the setting of abnormal kidney function  Titrate medication depending on blood pressure response  HTN: Increase  coreg 12.5 mg bid   Historically non  compliance is also been an issue  Multiple leg wounds ulcers skin tears due to venous congestion wound care as per primary team   severe arterial  disease on Doppler , s/p toe amputation s/p PCI earlier month ago continue aspirin Plavix  DVT prophylaxis if no contraindication  6.    Dyslipidemia: continue statins     Past medical history:    has a past medical history of Acid reflux, Acute MI ([address] Arthritis, Broken teeth, CAD (coronary artery disease), Cardiomyopathy ([address] CHF (congestive heart failure) ([address] Chronic back pain, Chronic kidney disease, Diabetes mellitus ([address] Diabetic neuropathy ([address] H/O cardiovascular stress test, H/O Doppler ultrasound, H/O percutaneous left heart catheterization, History of irregular heartbeat, History of syncope, Hyperlipidemia, Hypertension, Leg swelling, Necrotic toes ([address] Neuropathy, neuropathy, PAD (peripheral artery disease) ([facility] PVD (peripheral vascular disease) ([facility] Sick sinus syndrome ([facility] [facility] Sleep apnea, Spinal stenosis, Teeth missing, Type 2 diabetes mellitus without complication ([facility] [address] and WD-Chronic foot ulcer, left, with necrosis of bone ([facility] [address] Past surgical history:   has a past surgical history that includes Coronary angioplasty with stent; Dental surgery; Colonoscopy (08/04/2016); pacemaker placement (06/04/2010); vascular surgery; colectomy (Right, 08/26/2016); Toe amputation (Right, 09/12/2017); Toe amputation (Right, 01/09/2018); Cardiac catheterization; Cardiac defibrillator placement (06/04/2010); Coronary angioplasty; Cardiac catheterization (07/14/2017); Cardiac catheterization (11/20/2018); Toe amputation (Left, 12/26/2020); IR TUNNELED CVC PLACE WO SQ PORT/PUMP > 5 YEARS (6/14/2021); Toe amputation (Left, 7/26/2022); and Toe amputation (Right, 10/20/2022). Social History:   reports that he quit smoking about 12 months ago. His smoking use included cigars and cigarettes. He started smoking about 42 years ago. He has a 9.00 pack-year smoking history. He has never used smokeless tobacco. He reports current drug use. Drug: Marijuana [place]). He reports that he does not drink alcohol. Family history:  family history includes Cancer in his brother, father, and son; Diabetes in his brother, mother, and sister; Early Death (age of onset: 62) in his sister; Heart Disease in his brother and sister; High Blood Pressure in his brother, brother, and mother; Neuropathy in his sister; Other in his sister; Stroke in his mother; Vision Loss in his mother.     Allergies   Allergen Reactions    Pcn [Penicillins] Hives    Fentanyl Itching       Review of Systems:    All 14 systems were reviewed and are negative  Except for the positive findings  which as documented     BP (!) 152/62   Pulse 62   Temp 97 °F (36.1 °C) (Oral)   Resp 15   Ht 6' (1.829 m)   Wt 261 lb (118.4 kg)   SpO2 94%   BMI 35.40 kg/m²     Intake/Output Summary (Last 24 hours) at 11/14/2022 1915  Last data filed at 11/14/2022 1821  Gross per 24 hour   Intake 930.48 ml   Output 3700 ml   Net -2769.52 ml     Physical Exam:  Constitutional:  Well developed, Well nourished, No acute distress, Non-toxic appearance. HENT:  Normocephalic, Atraumatic, Bilateral external ears normal, Oropharynx moist, No oral exudates, Nose normal. Neck- Normal range of motion, No tenderness, Supple, No stridor. Eyes:  PERRL, EOMI, Conjunctiva normal, No discharge. Respiratory:  Normal breath sounds, No respiratory distress, No wheezing, No chest tenderness. Cardiovascular:  Normal heart rate, Normal rhythm, No murmurs, No rubs, No gallops, JVP not elevated  Abdomen/GI:  Bowel sounds normal, Soft, No tenderness, No masses, No pulsatile masses. Musculoskeletal:  Intact distal pulses, No edema, No tenderness, No cyanosis, No clubbing. Good range of motion in all major joints. No tenderness to palpation or major deformities noted. Back- No tenderness. Integument:  Warm, Dry, No erythema, No rash. Lymphatic:  No lymphadenopathy noted. Neurologic:  Alert & oriented x 3, Normal motor function, Normal sensory function, No focal deficits noted.    Psychiatric:  Affect  and  Mood :no change    Medications:    [START ON 11/15/2022] isosorbide mononitrate  60 mg Oral Daily    heparin (porcine)  5,000 Units SubCUTAneous 3 times per day    furosemide  40 mg IntraVENous BID    empagliflozin  10 mg Oral Daily    carvedilol  6.25 mg Oral BID     sodium hypochlorite   Irrigation Daily    vancomycin  1,000 mg IntraVENous Q24H    aspirin  81 mg Oral Daily    atorvastatin  40 mg Oral Nightly    clopidogrel  75 mg Oral Daily    magnesium oxide  400 mg Oral BID    tiotropium  2 puff Inhalation Daily    sodium chloride flush  5-40 mL IntraVENous 2 times per day    insulin lispro  0-4 Units SubCUTAneous TID     insulin lispro  0-4 Units SubCUTAneous Nightly metOLazone  2.5 mg Oral BID    collagenase   Topical Daily      sodium chloride      dextrose       hydrALAZINE, albuterol sulfate HFA, sodium chloride flush, sodium chloride, ondansetron **OR** ondansetron, polyethylene glycol, acetaminophen **OR** acetaminophen, glucose, dextrose bolus **OR** dextrose bolus, glucagon (rDNA), dextrose    Lab Data:  CBC:   Recent Labs     11/12/22  0726 11/13/22  1636 11/14/22  0955   WBC 4.6 4.4 4.7   HGB 8.4* 8.6* 8.7*   HCT 27.9* 29.2* 28.6*   MCV 88.9 88.5 87.5    167 167     BMP:   Recent Labs     11/12/22  0726 11/13/22  0432 11/14/22  0955    140 139   K 5.1 5.0 4.6    103 99   CO2 27 25 29   PHOS 3.5 3.8  --    BUN 31* 33* 34*   CREATININE 2.1* 2.4* 2.3*     PT/INR: No results for input(s): PROTIME, INR in the last 72 hours. BNP:    No results for input(s): PROBNP in the last 72 hours. TROPONIN:   No results for input(s): TROPONINT in the last 72 hours. ECHO :   echocardiogram    Assessment:  67 y. o.year old who is admitted for  Chief Complaint   Patient presents with    Leg Pain    Post-op Problem     Right foot    , active issues as noted below:  Impression:  Principal Problem:    Acute on chronic diastolic heart failure (HCC)  Active Problems:    CHF (congestive heart failure), NYHA class I, acute on chronic, combined (HCC)    Acute on chronic diastolic CHF (congestive heart failure) (HCC)    Leg swelling    Skin ulcer of left foot including toes with fat layer exposed ([address]    Superficial incisional surgical site infection    Biventricular ICD (implantable cardioverter-defibrillator) in place    WD-PVD (peripheral vascular disease) ([address]  Resolved Problems:    * No resolved hospital problems. *            All labs, medications and tests reviewed by myself , continue all other medications of all above medical condition listed as is except for changes mentioned above. Thank you very much for consult , please call with questions.     Usman Senior Azeem Iyer MD, MD 11/14/2022 7:15 PM

## 2022-11-15 NOTE — PROGRESS NOTES
Plan for HENRI   Alternates and risk of the procedure were dicussed in detail  Patient is in agreement to proceed  Mallampati is 3 ASA is 2

## 2022-11-15 NOTE — PLAN OF CARE
Problem: Discharge Planning  Goal: Discharge to home or other facility with appropriate resources  Outcome: Progressing     Problem: Safety - Adult  Goal: Free from fall injury  Outcome: Progressing     Problem: ABCDS Injury Assessment  Goal: Absence of physical injury  Outcome: Progressing     Problem: Skin/Tissue Integrity  Goal: Absence of new skin breakdown  Description: 1. Monitor for areas of redness and/or skin breakdown  2. Assess vascular access sites hourly  3. Every 4-6 hours minimum:  Change oxygen saturation probe site  4. Every 4-6 hours:  If on nasal continuous positive airway pressure, respiratory therapy assess nares and determine need for appliance change or resting period.   Outcome: Progressing     Problem: Chronic Conditions and Co-morbidities  Goal: Patient's chronic conditions and co-morbidity symptoms are monitored and maintained or improved  Outcome: Progressing     Problem: Respiratory - Adult  Goal: Achieves optimal ventilation and oxygenation  Outcome: Progressing     Problem: Musculoskeletal - Adult  Goal: Return mobility to safest level of function  Outcome: Progressing  Goal: Return ADL status to a safe level of function  Outcome: Progressing     Problem: Infection - Adult  Goal: Absence of infection at discharge  Outcome: Progressing     Problem: Metabolic/Fluid and Electrolytes - Adult  Goal: Electrolytes maintained within normal limits  Outcome: Progressing     Problem: Nutrition Deficit:  Goal: Optimize nutritional status  Outcome: Progressing

## 2022-11-15 NOTE — PROGRESS NOTES
JQ=100/80 Patient is refusing meds @ this time. He is agitated and irrate stating DR. Gilberto Hicks came this AM and told Him He could go home. I explained to Mr. George Shrestha that d/c orders had to be placed before He could be discharged.  He threatened to leave   [facility] notified via [employer]

## 2022-11-15 NOTE — PROGRESS NOTES
Hospitalist    Pseudomonas growing from R 1st toe amputation site culture 11/13/2022. He also had a Pseudomonas positive foot culture last admission (October 12). HENRI planned today (has pacer, and has an Enterococcus positive blood cx).

## 2022-11-15 NOTE — PROGRESS NOTES
Cardiology Progress Note     Admit Date:  11/11/2022    Consult reason/ Seen today for :       Subjective and  Overnight Events : Blood cultures were positive for Enterococcus , HENRI did not show any significant valvular disease  leg swelling is improved, Bp is higher today . Chief complain on admission : 67 y. o.year old who is admitted for  Chief Complaint   Patient presents with    Leg Pain    Post-op Problem     Right foot      Assessment / Plan:  Enterococcus bacteremia , HENRI negative for vegetation no abnormality seen on the pacer wire either  Echo is concerning for right heart failure with dilated right side and RVSP in 80s bilateral lower extremity swelling and scrotal swelling  Continue  Lasix to 40mg bid he is in renal failure probably due to venous renal congestion  History of coronary artery disease stable  CHF: Heart failure with preserved EF abnormal troponin in the setting of abnormal kidney function  Titrate medication depending on blood pressure response  HTN: Increase  coreg 25 mg bid add hydralazine 25 3 times daily  Historically non  compliance is also been an issue  Multiple leg wounds ulcers skin tears due to venous congestion wound care as per primary team   severe arterial  disease on Doppler , s/p toe amputation s/p PCI earlier month ago continue aspirin Plavix  DVT prophylaxis if no contraindication  6.    Dyslipidemia: continue statins     Past medical history:    has a past medical history of Acid reflux, Acute MI ([address] Arthritis, Broken teeth, CAD (coronary artery disease), Cardiomyopathy ([address] CHF (congestive heart failure) ([address] Chronic back pain, Chronic kidney disease, Diabetes mellitus ([address] Diabetic neuropathy ([address] H/O cardiovascular stress test, H/O Doppler ultrasound, H/O percutaneous left heart catheterization, History of irregular heartbeat, History of syncope, Hyperlipidemia, Hypertension, Leg swelling, Necrotic toes (HCC), Neuropathy, neuropathy, PAD (peripheral artery disease) ([facility] [address] PVD (peripheral vascular disease) ([facility] [address] Sick sinus syndrome ([facility] Sleep apnea, Spinal stenosis, Teeth missing, Type 2 diabetes mellitus without complication ([facility] [address] and WD-Chronic foot ulcer, left, with necrosis of bone ([facility] Past surgical history:   has a past surgical history that includes Coronary angioplasty with stent; Dental surgery; Colonoscopy (08/04/2016); pacemaker placement (06/04/2010); vascular surgery; colectomy (Right, 08/26/2016); Toe amputation (Right, 09/12/2017); Toe amputation (Right, 01/09/2018); Cardiac catheterization; Cardiac defibrillator placement (06/04/2010); Coronary angioplasty; Cardiac catheterization (07/14/2017); Cardiac catheterization (11/20/2018); Toe amputation (Left, 12/26/2020); IR TUNNELED CVC PLACE WO SQ PORT/PUMP > 5 YEARS (6/14/2021); Toe amputation (Left, 7/26/2022); and Toe amputation (Right, 10/20/2022). Social History:   reports that he quit smoking about 12 months ago. His smoking use included cigars and cigarettes. He started smoking about 42 years ago. He has a 9.00 pack-year smoking history. He has never used smokeless tobacco. He reports current drug use. Drug: Marijuana [place]). He reports that he does not drink alcohol. Family history:  family history includes Cancer in his brother, father, and son; Diabetes in his brother, mother, and sister; Early Death (age of onset: 62) in his sister; Heart Disease in his brother and sister; High Blood Pressure in his brother, brother, and mother; Neuropathy in his sister; Other in his sister; Stroke in his mother; Vision Loss in his mother.     Allergies   Allergen Reactions    Pcn [Penicillins] Hives    Fentanyl Itching       Review of Systems:    All 14 systems were reviewed and are negative  Except for the positive findings  which as documented     BP (!) 155/56   Pulse 63   Temp 96.8 °F (36 °C) (Oral)   Resp 18   Ht 6' (1.829 m)   Wt 261 lb (118.4 kg)   SpO2 99%   BMI 35.40 kg/m²     Intake/Output Summary (Last 24 hours) at 11/15/2022 1434  Last data filed at 11/15/2022 1228  Gross per 24 hour   Intake 495 ml   Output 3050 ml   Net -2555 ml     Physical Exam:  Constitutional:  Well developed, Well nourished, No acute distress, Non-toxic appearance. HENT:  Normocephalic, Atraumatic, Bilateral external ears normal, Oropharynx moist, No oral exudates, Nose normal. Neck- Normal range of motion, No tenderness, Supple, No stridor. Eyes:  PERRL, EOMI, Conjunctiva normal, No discharge. Respiratory:  Normal breath sounds, No respiratory distress, No wheezing, No chest tenderness. Cardiovascular:  Normal heart rate, Normal rhythm, No murmurs, No rubs, No gallops, JVP not elevated  Abdomen/GI:  Bowel sounds normal, Soft, No tenderness, No masses, No pulsatile masses. Musculoskeletal:  Intact distal pulses, No edema, No tenderness, No cyanosis, No clubbing. Good range of motion in all major joints. No tenderness to palpation or major deformities noted. Back- No tenderness. Integument:  Warm, Dry, No erythema, No rash. Lymphatic:  No lymphadenopathy noted. Neurologic:  Alert & oriented x 3, Normal motor function, Normal sensory function, No focal deficits noted.    Psychiatric:  Affect  and  Mood :no change    Medications:    amLODIPine  10 mg Oral Daily    meropenem  1,000 mg IntraVENous Once    Followed by    [START ON 11/16/2022] meropenem  1,000 mg IntraVENous Q12H    isosorbide mononitrate  60 mg Oral Daily    carvedilol  12.5 mg Oral BID     heparin (porcine)  5,000 Units SubCUTAneous 3 times per day    furosemide  40 mg IntraVENous BID    empagliflozin  10 mg Oral Daily    sodium hypochlorite   Irrigation Daily    aspirin  81 mg Oral Daily    atorvastatin  40 mg Oral Nightly    clopidogrel  75 mg Oral Daily    magnesium oxide  400 mg Oral BID    tiotropium  2 puff Inhalation Daily    sodium chloride flush  5-40 mL IntraVENous 2 times per day    insulin lispro  0-4 Units SubCUTAneous TID WC    insulin lispro  0-4 Units SubCUTAneous Nightly    metOLazone  2.5 mg Oral BID    collagenase   Topical Daily      sodium chloride      dextrose       hydrALAZINE, albuterol sulfate HFA, sodium chloride flush, sodium chloride, ondansetron **OR** ondansetron, polyethylene glycol, acetaminophen **OR** acetaminophen, glucose, dextrose bolus **OR** dextrose bolus, glucagon (rDNA), dextrose    Lab Data:  CBC:   Recent Labs     11/13/22  1636 11/14/22  0955   WBC 4.4 4.7   HGB 8.6* 8.7*   HCT 29.2* 28.6*   MCV 88.5 87.5    167     BMP:   Recent Labs     11/13/22  0432 11/14/22  0955 11/15/22  0544    139 138   K 5.0 4.6 4.4    99 99   CO2 25 29 32   PHOS 3.8  --   --    BUN 33* 34* 35*   CREATININE 2.4* 2.3* 2.3*     PT/INR: No results for input(s): PROTIME, INR in the last 72 hours. BNP:    No results for input(s): PROBNP in the last 72 hours. TROPONIN:   No results for input(s): TROPONINT in the last 72 hours. ECHO :   echocardiogram    Assessment:  67 y. o.year old who is admitted for  Chief Complaint   Patient presents with    Leg Pain    Post-op Problem     Right foot    , active issues as noted below:  Impression:  Principal Problem:    Acute on chronic diastolic heart failure (HCC)  Active Problems:    CHF (congestive heart failure), NYHA class I, acute on chronic, combined (HCC)    Acute on chronic diastolic CHF (congestive heart failure) ([facility])    Leg swelling    Skin ulcer of left foot including toes with fat layer exposed ([address]    Superficial incisional surgical site infection    Bacteremia due to Enterococcus    Biventricular ICD (implantable cardioverter-defibrillator) in place    WD-PVD (peripheral vascular disease) ([address]  Resolved Problems:    * No resolved hospital problems.  *            All labs, medications and tests reviewed by myself , continue all other medications of all above medical condition listed as is except for changes mentioned above. Thank you very much for consult , please call with questions.     Zo Eduardo MD, MD 11/15/2022 2:34 PM

## 2022-11-15 NOTE — PROGRESS NOTES
Nephrology Progress Note  11/15/2022 9:19 AM        Subjective:   Admit Date: 11/11/2022  PCP: Annelise Branch    Interval History:  patient seen in early morning, this is a late entry   he wants to go home    Diet:  reasonable    ROS:   no overt shortness of breath   edema seems better   urine output still 3 L/day   blood pressure variable reported little high   no fever    Data:     Current meds:    isosorbide mononitrate  60 mg Oral Daily    carvedilol  12.5 mg Oral BID WC    heparin (porcine)  5,000 Units SubCUTAneous 3 times per day    furosemide  40 mg IntraVENous BID    empagliflozin  10 mg Oral Daily    sodium hypochlorite   Irrigation Daily    vancomycin  1,000 mg IntraVENous Q24H    aspirin  81 mg Oral Daily    atorvastatin  40 mg Oral Nightly    clopidogrel  75 mg Oral Daily    magnesium oxide  400 mg Oral BID    tiotropium  2 puff Inhalation Daily    sodium chloride flush  5-40 mL IntraVENous 2 times per day    insulin lispro  0-4 Units SubCUTAneous TID WC    insulin lispro  0-4 Units SubCUTAneous Nightly    metOLazone  2.5 mg Oral BID    collagenase   Topical Daily      sodium chloride      dextrose           I/O last 3 completed shifts: In: 1065.5 [P.O.:710;  I.V.:40; IV Piggyback:315.5]  Out: 5500 [Urine:5500]    CBC:   Recent Labs     11/13/22  1636 11/14/22  0955   WBC 4.4 4.7   HGB 8.6* 8.7*    167          Recent Labs     11/13/22  0432 11/14/22  0955 11/15/22  0544    139 138   K 5.0 4.6 4.4    99 99   CO2 25 29 32   BUN 33* 34* 35*   CREATININE 2.4* 2.3* 2.3*   GLUCOSE 73 135* 152*       Lab Results   Component Value Date    CALCIUM 8.7 11/15/2022    PHOS 3.8 11/13/2022       Objective:     Vitals: BP (!) 179/80   Pulse 61   Temp 97.5 °F (36.4 °C) (Oral)   Resp 15   Ht 6' (1.829 m)   Wt 261 lb (118.4 kg)   SpO2 97%   BMI 35.40 kg/m² ,    General appearance:   alert, awake and oriented  HEENT:   positive conjunctival pallor  Neck:   supple  Lungs:   positive adventitious breath sound  Heart:   regular rate and rhythm,  chest wall implanted cardiac device  Abdomen:  soft  Extremities:   thigh and leg edema better, chronic leg wound      Problem List :         Impression :      acute kidney injury on top of CKD stage IIIb A3- stable due to cardiorenal syndrome   fluid overload much better with combination of SGLT2 and loop   underlying diabetes with proteinuria as well as atherosclerotic cardiovascular disease and chronic leg wound   adjustment disorder with depressed mood   hypertension add calcium channel blocker for now I will try ACE or ARB as an outpatient    Recommendation/Plan  :      okay to discharge from my standpoint   discharged with torsemide 50 twice daily, metolazone 2.5 daily and SGLT2 inhibitors   low-salt, DASH diet   good glycemic control   add amlodipine 10 mg daily   CMP, magnesium and phosphorus in 1 week   follow-up with me in 2 weeks      Danita Almonte MD MD

## 2022-11-15 NOTE — PROGRESS NOTES
[id number] [facility]  consulted by Dr. Thomas Tabares for monitoring and adjustment. Indication for treatment: Vancomycin indication: SSTI with risk factors   Goal trough: Trough Goal: 10-15 mcg/mL  AUC/RJ: <500    Risk Factors for MRSA Identified:   Hospitalization within the past 90 days, Received IV antibiotics within the past 90 days    Pertinent Laboratory Values:   Temp Readings from Last 3 Encounters:   11/15/22 96.8 °F (36 °C) (Oral)   10/24/22 98.2 °F (36.8 °C) (Oral)   08/23/22 97.2 °F (36.2 °C) (Temporal)     Recent Labs     11/13/22  1636 11/14/22  0955   WBC 4.4 4.7       Recent Labs     11/13/22  0432 11/14/22  0955 11/15/22  0544   BUN 33* 34* 35*   CREATININE 2.4* 2.3* 2.3*       Estimated Creatinine Clearance: 39 mL/min (A) (based on SCr of 2.3 mg/dL (H)). Intake/Output Summary (Last 24 hours) at 11/15/2022 1152  Last data filed at 11/15/2022 0225  Gross per 24 hour   Intake 375 ml   Output 3050 ml   Net -2675 ml         Pertinent Cultures:   Date    Source    Results  10/12              Surgical sp   Pseudomonas, Beta Strep Grp B  11/11   Blood    Enterococcus 1/2 11/13   Blood    NGTD  11/13   MRSA Nasal   Pending      Vancomycin level:   TROUGH:  No results for input(s): VANCOTROUGH in the last 72 hours. RANDOM:    Recent Labs     11/15/22  0544   VANCORANDOM 23.3       Assessment:  HPI: A 67 y.o male who presents with concern for post-operative wound. He accidentally bumped his right foot and it started to bleed through his dressings. He was recently admitted to the hospital and had his right great toe and two toes of his left foot amputated.   SCr, BUN, and urine output:  CKD, Scr trends stable in range 2-2.3  Day(s) of therapy: 3 of 7  Vancomycin concentration:  11/15: 23.3, collected 3h post-dose,     Plan:  Continue vancomycin 1000 mg IVPB q24h  Predicted trough: 15.7,   Plan repeat level in 48h to monitor for accumulation with CKD  Pharmacy will continue to monitor patient and adjust therapy as indicated    Brent 3 11/17 @0600    Thank you for the consult,  Serene Galan [address] [facility], PharmD  11/15/2022 11:52 AM

## 2022-11-15 NOTE — PLAN OF CARE
Problem: Discharge Planning  Goal: Discharge to home or other facility with appropriate resources  11/15/2022 1404 by Madeline Knowles RN  Outcome: Progressing  Flowsheets (Taken 11/15/2022 0810)  Discharge to home or other facility with appropriate resources:   Identify barriers to discharge with patient and caregiver   Arrange for needed discharge resources and transportation as appropriate   Identify discharge learning needs (meds, wound care, etc)   Arrange for interpreters to assist at discharge as needed   Refer to discharge planning if patient needs post-hospital services based on physician order or complex needs related to functional status, cognitive ability or social support system  11/15/2022 0606 by Nan Hendrix RN  Outcome: Progressing     Problem: Safety - Adult  Goal: Free from fall injury  11/15/2022 1404 by Madeline Knowles RN  Outcome: Progressing  11/15/2022 0606 by Nan Hendrix RN  Outcome: Progressing     Problem: ABCDS Injury Assessment  Goal: Absence of physical injury  11/15/2022 1404 by Madeline Knowles RN  Outcome: Progressing  11/15/2022 0606 by Nan Hendrix RN  Outcome: Progressing     Problem: Skin/Tissue Integrity  Goal: Absence of new skin breakdown  Description: 1. Monitor for areas of redness and/or skin breakdown  2. Assess vascular access sites hourly  3. Every 4-6 hours minimum:  Change oxygen saturation probe site  4. Every 4-6 hours:  If on nasal continuous positive airway pressure, respiratory therapy assess nares and determine need for appliance change or resting period.   11/15/2022 1404 by Madeline Knwoles RN  Outcome: Progressing  11/15/2022 0606 by Nan Hendrix RN  Outcome: Progressing     Problem: Chronic Conditions and Co-morbidities  Goal: Patient's chronic conditions and co-morbidity symptoms are monitored and maintained or improved  11/15/2022 1404 by Madeline Knowles RN  Outcome: Progressing  Flowsheets (Taken 11/15/2022 0846)  Care Plan - Patient's Chronic Conditions and Co-Morbidity Symptoms are Monitored and Maintained or Improved:   Collaborate with multidisciplinary team to address chronic and comorbid conditions and prevent exacerbation or deterioration   Update acute care plan with appropriate goals if chronic or comorbid symptoms are exacerbated and prevent overall improvement and discharge   Monitor and assess patient's chronic conditions and comorbid symptoms for stability, deterioration, or improvement  11/15/2022 0606 by Mandy Levin RN  Outcome: Progressing     Problem: Pain  Goal: Verbalizes/displays adequate comfort level or baseline comfort level  11/15/2022 1404 by Jeffrey Dubose RN  Outcome: Progressing  Flowsheets (Taken 11/15/2022 1100)  Verbalizes/displays adequate comfort level or baseline comfort level:   Encourage patient to monitor pain and request assistance   Assess pain using appropriate pain scale   Administer analgesics based on type and severity of pain and evaluate response   Implement non-pharmacological measures as appropriate and evaluate response   Consider cultural and social influences on pain and pain management   Notify Licensed Independent Practitioner if interventions unsuccessful or patient reports new pain  11/15/2022 0606 by Mandy Levin RN  Outcome: Progressing     Problem: Respiratory - Adult  Goal: Achieves optimal ventilation and oxygenation  11/15/2022 1404 by Jeffrey Dubose RN  Outcome: Progressing  Flowsheets (Taken 11/15/2022 0810)  Achieves optimal ventilation and oxygenation:   Assess for changes in mentation and behavior   Assess for changes in respiratory status   Position to facilitate oxygenation and minimize respiratory effort   Oxygen supplementation based on oxygen saturation or arterial blood gases   Initiate smoking cessation protocol as indicated   Encourage broncho-pulmonary hygiene including cough, deep breathe, incentive spirometry   Assess the need for suctioning and aspirate as needed   Assess and instruct to report shortness of breath or any respiratory difficulty   Respiratory therapy support as indicated  11/15/2022 0606 by Jaswinder Mc RN  Outcome: Progressing     Problem: Musculoskeletal - Adult  Goal: Return mobility to safest level of function  11/15/2022 1404 by Jt Concepcion RN  Outcome: Progressing  Flowsheets (Taken 11/15/2022 0810)  Return Mobility to Safest Level of Function:   Assess patient stability and activity tolerance for standing, transferring and ambulating with or without assistive devices   Assist with transfers and ambulation using safe patient handling equipment as needed   Ensure adequate protection for wounds/incisions during mobilization   Obtain physical therapy/occupational therapy consults as needed   Apply continuous passive motion per provider or physical therapy orders to increase flexion toward goal   Instruct patient/family in ordered activity level  11/15/2022 0606 by Jaswinder Mc RN  Outcome: Progressing  Goal: Return ADL status to a safe level of function  11/15/2022 1404 by Jt Concepcion RN  Outcome: Progressing  Flowsheets (Taken 11/15/2022 0810)  Return ADL Status to a Safe Level of Function:   Administer medication as ordered   Assess activities of daily living deficits and provide assistive devices as needed   Obtain physical therapy/occupational therapy consults as needed   Assist and instruct patient to increase activity and self care as tolerated  11/15/2022 0606 by Jaswinder Mc RN  Outcome: Progressing     Problem: Infection - Adult  Goal: Absence of infection at discharge  11/15/2022 1404 by Jt Concepcion RN  Outcome: Progressing  Flowsheets (Taken 11/15/2022 0810)  Absence of infection at discharge:   Assess and monitor for signs and symptoms of infection   Monitor lab/diagnostic results   Monitor all insertion sites i.e., indwelling lines, tubes and drains   Monitor endotracheal (as able) and nasal secretions for changes in amount and color   Administer medications as ordered   [place] appropriate cooling/warming therapies per order   Instruct and encourage patient and family to use good hand hygiene technique   Identify and instruct in appropriate isolation precautions for identified infection/condition  11/15/2022 0606 by Lidya Fritz RN  Outcome: Progressing     Problem: Metabolic/Fluid and Electrolytes - Adult  Goal: Electrolytes maintained within normal limits  11/15/2022 1404 by Sabas Pepper RN  Outcome: Progressing  Flowsheets (Taken 11/15/2022 0810)  Electrolytes maintained within normal limits:   Monitor labs and assess patient for signs and symptoms of electrolyte imbalances   Administer electrolyte replacement as ordered   Monitor response to electrolyte replacements, including repeat lab results as appropriate   Fluid restriction as ordered   Instruct patient on fluid and nutrition restrictions as appropriate  11/15/2022 0606 by Lidya Fritz RN  Outcome: Progressing     Problem: Nutrition Deficit:  Goal: Optimize nutritional status  11/15/2022 1404 by Sabas Pepper RN  Outcome: Progressing  11/15/2022 0606 by Lidya Fritz RN  Outcome: Progressing

## 2022-11-16 ENCOUNTER — TELEPHONE (OUTPATIENT)
Dept: WOUND CARE | Age: 72
End: 2022-11-16

## 2022-11-16 LAB
ALBUMIN SERPL-MCNC: 3.4 GM/DL (ref 3.4–5)
ALP BLD-CCNC: 98 IU/L (ref 40–128)
ALT SERPL-CCNC: 7 U/L (ref 10–40)
ANION GAP SERPL CALCULATED.3IONS-SCNC: 11 MMOL/L (ref 4–16)
AST SERPL-CCNC: 11 IU/L (ref 15–37)
BILIRUB SERPL-MCNC: 0.8 MG/DL (ref 0–1)
BUN BLDV-MCNC: 36 MG/DL (ref 6–23)
C-REACTIVE PROTEIN, HIGH SENSITIVITY: 23.7 MG/L
CALCIUM SERPL-MCNC: 8.4 MG/DL (ref 8.3–10.6)
CHLORIDE BLD-SCNC: 93 MMOL/L (ref 99–110)
CO2: 33 MMOL/L (ref 21–32)
CREAT SERPL-MCNC: 2.2 MG/DL (ref 0.9–1.3)
CULTURE: ABNORMAL
CULTURE: NORMAL
GFR SERPL CREATININE-BSD FRML MDRD: 31 ML/MIN/1.73M2
GLUCOSE BLD-MCNC: 172 MG/DL (ref 70–99)
GLUCOSE BLD-MCNC: 187 MG/DL (ref 70–99)
GLUCOSE BLD-MCNC: 254 MG/DL (ref 70–99)
GLUCOSE BLD-MCNC: 314 MG/DL (ref 70–99)
HCT VFR BLD CALC: 29.3 % (ref 42–52)
HEMOGLOBIN: 8.9 GM/DL (ref 13.5–18)
Lab: ABNORMAL
Lab: NORMAL
MCH RBC QN AUTO: 26.5 PG (ref 27–31)
MCHC RBC AUTO-ENTMCNC: 30.4 % (ref 32–36)
MCV RBC AUTO: 87.2 FL (ref 78–100)
PDW BLD-RTO: 17.2 % (ref 11.7–14.9)
PLATELET # BLD: 196 K/CU MM (ref 140–440)
PMV BLD AUTO: 9.7 FL (ref 7.5–11.1)
POTASSIUM SERPL-SCNC: 4.3 MMOL/L (ref 3.5–5.1)
RBC # BLD: 3.36 M/CU MM (ref 4.6–6.2)
SODIUM BLD-SCNC: 137 MMOL/L (ref 135–145)
SPECIMEN: ABNORMAL
SPECIMEN: NORMAL
TOTAL PROTEIN: 7.1 GM/DL (ref 6.4–8.2)
WBC # BLD: 5.1 K/CU MM (ref 4–10.5)

## 2022-11-16 PROCEDURE — 6360000002 HC RX W HCPCS: Performed by: INTERNAL MEDICINE

## 2022-11-16 PROCEDURE — 99233 SBSQ HOSP IP/OBS HIGH 50: CPT | Performed by: INTERNAL MEDICINE

## 2022-11-16 PROCEDURE — 94761 N-INVAS EAR/PLS OXIMETRY MLT: CPT

## 2022-11-16 PROCEDURE — 6370000000 HC RX 637 (ALT 250 FOR IP): Performed by: STUDENT IN AN ORGANIZED HEALTH CARE EDUCATION/TRAINING PROGRAM

## 2022-11-16 PROCEDURE — 2580000003 HC RX 258: Performed by: INTERNAL MEDICINE

## 2022-11-16 PROCEDURE — 6370000000 HC RX 637 (ALT 250 FOR IP): Performed by: INTERNAL MEDICINE

## 2022-11-16 PROCEDURE — 82962 GLUCOSE BLOOD TEST: CPT

## 2022-11-16 PROCEDURE — APPSS45 APP SPLIT SHARED TIME 31-45 MINUTES: Performed by: NURSE PRACTITIONER

## 2022-11-16 PROCEDURE — 2140000000 HC CCU INTERMEDIATE R&B

## 2022-11-16 PROCEDURE — 86140 C-REACTIVE PROTEIN: CPT

## 2022-11-16 PROCEDURE — 80053 COMPREHEN METABOLIC PANEL: CPT

## 2022-11-16 PROCEDURE — 2580000003 HC RX 258: Performed by: STUDENT IN AN ORGANIZED HEALTH CARE EDUCATION/TRAINING PROGRAM

## 2022-11-16 PROCEDURE — 85027 COMPLETE CBC AUTOMATED: CPT

## 2022-11-16 PROCEDURE — 87040 BLOOD CULTURE FOR BACTERIA: CPT

## 2022-11-16 PROCEDURE — 6360000002 HC RX W HCPCS: Performed by: STUDENT IN AN ORGANIZED HEALTH CARE EDUCATION/TRAINING PROGRAM

## 2022-11-16 PROCEDURE — 36415 COLL VENOUS BLD VENIPUNCTURE: CPT

## 2022-11-16 RX ORDER — TORSEMIDE 20 MG/1
50 TABLET ORAL 2 TIMES DAILY
Status: DISCONTINUED | OUTPATIENT
Start: 2022-11-16 | End: 2022-11-18 | Stop reason: HOSPADM

## 2022-11-16 RX ADMIN — ACETAMINOPHEN 650 MG: 325 TABLET ORAL at 20:12

## 2022-11-16 RX ADMIN — CLOPIDOGREL BISULFATE 75 MG: 75 TABLET ORAL at 09:41

## 2022-11-16 RX ADMIN — FUROSEMIDE 40 MG: 10 INJECTION, SOLUTION INTRAMUSCULAR; INTRAVENOUS at 09:41

## 2022-11-16 RX ADMIN — MAGNESIUM OXIDE TAB 400 MG (241.3 MG ELEMENTAL MG) 400 MG: 400 (241.3 MG) TAB at 20:12

## 2022-11-16 RX ADMIN — MAGNESIUM OXIDE TAB 400 MG (241.3 MG ELEMENTAL MG) 400 MG: 400 (241.3 MG) TAB at 09:41

## 2022-11-16 RX ADMIN — Medication: at 13:00

## 2022-11-16 RX ADMIN — METOLAZONE 2.5 MG: 2.5 TABLET ORAL at 09:40

## 2022-11-16 RX ADMIN — TORSEMIDE 50 MG: 20 TABLET ORAL at 20:12

## 2022-11-16 RX ADMIN — AMLODIPINE BESYLATE 10 MG: 10 TABLET ORAL at 09:40

## 2022-11-16 RX ADMIN — MEROPENEM 1000 MG: 1 INJECTION, POWDER, FOR SOLUTION INTRAVENOUS at 14:50

## 2022-11-16 RX ADMIN — METOLAZONE 2.5 MG: 2.5 TABLET ORAL at 20:13

## 2022-11-16 RX ADMIN — EMPAGLIFLOZIN 10 MG: 10 TABLET, FILM COATED ORAL at 09:41

## 2022-11-16 RX ADMIN — TORSEMIDE 50 MG: 20 TABLET ORAL at 15:15

## 2022-11-16 RX ADMIN — SODIUM CHLORIDE, PRESERVATIVE FREE 10 ML: 5 INJECTION INTRAVENOUS at 13:20

## 2022-11-16 RX ADMIN — ISOSORBIDE MONONITRATE 60 MG: 30 TABLET, EXTENDED RELEASE ORAL at 09:40

## 2022-11-16 RX ADMIN — INSULIN LISPRO 4 UNITS: 100 INJECTION, SOLUTION INTRAVENOUS; SUBCUTANEOUS at 21:30

## 2022-11-16 RX ADMIN — TORSEMIDE 50 MG: 20 TABLET ORAL at 15:00

## 2022-11-16 RX ADMIN — ATORVASTATIN CALCIUM 40 MG: 40 TABLET, FILM COATED ORAL at 20:12

## 2022-11-16 RX ADMIN — CARVEDILOL 25 MG: 25 TABLET, FILM COATED ORAL at 09:45

## 2022-11-16 RX ADMIN — MEROPENEM 1000 MG: 1 INJECTION, POWDER, FOR SOLUTION INTRAVENOUS at 01:39

## 2022-11-16 RX ADMIN — ASPIRIN 81 MG CHEWABLE TABLET 81 MG: 81 TABLET CHEWABLE at 09:41

## 2022-11-16 RX ADMIN — HEPARIN SODIUM 5000 UNITS: 5000 INJECTION INTRAVENOUS; SUBCUTANEOUS at 14:51

## 2022-11-16 ASSESSMENT — PAIN SCALES - GENERAL
PAINLEVEL_OUTOF10: 0
PAINLEVEL_OUTOF10: 8
PAINLEVEL_OUTOF10: 4
PAINLEVEL_OUTOF10: 0

## 2022-11-16 ASSESSMENT — PAIN DESCRIPTION - LOCATION: LOCATION: FOOT

## 2022-11-16 NOTE — FLOWSHEET NOTE
Bedside report done with Janneth Mccullough RN, pt sitting on side of the bed with eyes closed. PT wakes to voice. Pt refusing to wear Tele and Spo2, refusing to lay in bed and elevate feet and bed alarm. Pt states he is leaving today. Will continue to encourage pt to wear tele and stay for medical care.

## 2022-11-16 NOTE — PROGRESS NOTES
Cardiology Progress Note     Today's Plan: sign off  Will be available if needed     Admit Date:  11/11/2022    Consult reason/ Seen today for: CHF    Subjective and  Overnight Events:  reports he is feeling better: no chest pain or shortness of breath   Continues with edema     Telemetry: refused    Assessment / Plan:     Acute on chronic decompensated HFpEF with right heart failure: severely dilated RV: RVSP 80 mmHg with severe TR: clinically improving- diuretics per nephrology: on  jardiance/ coreg/Imdur and torsemide   HTN-bp stable - amlodipine /coreg  CAD- denies cp- troponin elevated - type 2 rise- medical management- lipitor/asa/Imdur  PAD:Had PTA of left SFA 10/2022: continue ASA/ atorvastatin and plavix:   Dyslipidemia-on statin  Leg ulcers/wounds- per primary   CKD: nephology on board  + blood cultures: HENRI done : no vegetation noted      History of Presenting Illness:    Chief complain on admission : 67 y. o.year old who is admitted for  Chief Complaint   Patient presents with    Leg Pain    Post-op Problem     Right foot        Past medical history:    has a past medical history of Acid reflux, Acute MI ([address] Arthritis, Broken teeth, CAD (coronary artery disease), Cardiomyopathy ([address] CHF (congestive heart failure) ([address] Chronic back pain, Chronic kidney disease, Diabetes mellitus ([address] Diabetic neuropathy ([address] H/O cardiovascular stress test, H/O Doppler ultrasound, H/O percutaneous left heart catheterization, History of irregular heartbeat, History of syncope, Hyperlipidemia, Hypertension, Leg swelling, Necrotic toes (HCC), Neuropathy, neuropathy, PAD (peripheral artery disease) ([address] PVD (peripheral vascular disease) ([address] Sick sinus syndrome ([address] Sleep apnea, Spinal stenosis, Teeth missing, Type 2 diabetes mellitus without complication ([address] and WD-Chronic foot ulcer, left, with necrosis of bone ([address]   Past surgical history:   has a past surgical history that includes Coronary angioplasty with stent; Dental surgery; Colonoscopy (08/04/2016); pacemaker placement (06/04/2010); vascular surgery; colectomy (Right, 08/26/2016); Toe amputation (Right, 09/12/2017); Toe amputation (Right, 01/09/2018); Cardiac catheterization; Cardiac defibrillator placement (06/04/2010); Coronary angioplasty; Cardiac catheterization (07/14/2017); Cardiac catheterization (11/20/2018); Toe amputation (Left, 12/26/2020); IR TUNNELED CVC PLACE WO SQ PORT/PUMP > 5 YEARS (6/14/2021); Toe amputation (Left, 7/26/2022); and Toe amputation (Right, 10/20/2022). Social History:   reports that he quit smoking about 12 months ago. His smoking use included cigars and cigarettes. He started smoking about 42 years ago. He has a 9.00 pack-year smoking history. He has never used smokeless tobacco. He reports current drug use. Drug: Marijuana Jentong Feliz). He reports that he does not drink alcohol. Family history:  family history includes Cancer in his brother, father, and son; Diabetes in his brother, mother, and sister; Early Death (age of onset: 62) in his sister; Heart Disease in his brother and sister; High Blood Pressure in his brother, brother, and mother; Neuropathy in his sister; Other in his sister; Stroke in his mother; Vision Loss in his mother. Allergies   Allergen Reactions    Pcn [Penicillins] Hives    Fentanyl Itching       Review of Systems:   All 14 systems were reviewed and are negative  Except for the positive findings which are documented     /62   Pulse 60   Temp 97.5 °F (36.4 °C) (Oral)   Resp 12   Ht 6' (1.829 m)   Wt 261 lb (118.4 kg)   SpO2 93%   BMI 35.40 kg/m²     Intake/Output Summary (Last 24 hours) at 11/16/2022 1208  Last data filed at 11/16/2022 1131  Gross per 24 hour   Intake 120 ml   Output 2700 ml   Net -2580 ml         Physical Exam:  Physical Exam  Vitals reviewed. HENT:      Head: Normocephalic.    Eyes: Pupils: Pupils are equal, round, and reactive to light. Cardiovascular:      Rate and Rhythm: Normal rate. Heart sounds: Normal heart sounds. No murmur heard. Pulmonary:      Breath sounds: No wheezing or rales. Chest:      Comments: Left sided device pocket intact  Abdominal:      Tenderness: There is no abdominal tenderness. Genitourinary:     Penis: No swelling. Musculoskeletal:      Right lower leg: Edema present. Left lower leg: Edema present. Feet:      Comments: Bilateral feet are wrapped / dressed   Skin:     General: Skin is warm and dry. Capillary Refill: Capillary refill takes less than 2 seconds. Neurological:      Mental Status: He is alert and oriented to person, place, and time.         Medications:    torsemide  50 mg Oral BID    amLODIPine  10 mg Oral Daily    meropenem  1,000 mg IntraVENous Q12H    carvedilol  25 mg Oral BID WC    isosorbide mononitrate  60 mg Oral Daily    heparin (porcine)  5,000 Units SubCUTAneous 3 times per day    empagliflozin  10 mg Oral Daily    sodium hypochlorite   Irrigation Daily    aspirin  81 mg Oral Daily    atorvastatin  40 mg Oral Nightly    clopidogrel  75 mg Oral Daily    magnesium oxide  400 mg Oral BID    tiotropium  2 puff Inhalation Daily    sodium chloride flush  5-40 mL IntraVENous 2 times per day    insulin lispro  0-4 Units SubCUTAneous TID WC    insulin lispro  0-4 Units SubCUTAneous Nightly    metOLazone  2.5 mg Oral BID    collagenase   Topical Daily      sodium chloride      dextrose       hydrOXYzine HCl, hydrALAZINE, albuterol sulfate HFA, sodium chloride flush, sodium chloride, ondansetron **OR** ondansetron, polyethylene glycol, acetaminophen **OR** acetaminophen, glucose, dextrose bolus **OR** dextrose bolus, glucagon (rDNA), dextrose    Lab Data:  CBC:   Recent Labs     11/13/22  1636 11/14/22  0955   WBC 4.4 4.7   HGB 8.6* 8.7*   HCT 29.2* 28.6*   MCV 88.5 87.5    167       BMP:   Recent Labs 11/14/22  0955 11/15/22  0544    138   K 4.6 4.4   CL 99 99   CO2 29 32   BUN 34* 35*   CREATININE 2.3* 2.3*       PT/INR: No results for input(s): PROTIME, INR in the last 72 hours. BNP:  No results for input(s): PROBNP in the last 72 hours. TROPONIN: No results for input(s): TROPONINT in the last 72 hours. Impression:  Principal Problem:    Acute on chronic diastolic heart failure (HCC)  Active Problems:    CHF (congestive heart failure), NYHA class I, acute on chronic, combined (HCC)    Acute on chronic diastolic CHF (congestive heart failure) ([facility])    Leg swelling    Skin ulcer of left foot including toes with fat layer exposed ([address]    Superficial incisional surgical site infection    Bacteremia due to Enterococcus    Biventricular ICD (implantable cardioverter-defibrillator) in place    WD-PVD (peripheral vascular disease) ([address]  Resolved Problems:    * No resolved hospital problems. *       All labs, medications and tests reviewed by myself, continue all other medications of all above medical condition listed as is except for changes mentioned above. Thank you   Please call with questions.     Electronically signed by MICKY Santos CNP on 11/16/2022 at 12:08 PM

## 2022-11-16 NOTE — PROGRESS NOTES
V2.0  [facility] Progress Note      Name:  Sancho Ma /Age/Sex: 1950  (67 y.o. male)   MRN & CSN:  [id number] & [id number] Encounter Date/Time: 11/15/2022 6:22 PM EST    Location:  [address] [place] [address] PCP: Mindy Aguilar Day: 5    Assessment and Plan:   Sancho Ma is a 67 y.o. male with past medical history of peripheral artery disease status post PCI of left SFA, CKD stage III, multiple foot wounds, heart failure with preserved ejection fraction, hypertension, diabetes mellitus type 2, who presents with Acute on chronic diastolic heart failure (HCC)    Acute on chronic hypoxic respiratory failure  Acute on chronic diastolic heart failure exacerbation with significant right heart involvement  Initially was on Lasix 40 mg 3 times daily upon admission, de-escalated to twice daily for volume management  Strict intake and output  Measure daily weight with goal dry weight of 260 pounds  Cardiology following    Infected postop wound is possible  E faecalis positive blood culture  Right hallux amputation at [facility] on 10/20/2022 prior admission  Pseudomonas in right foot wound - await sensitivity  Blood cultures 1 out of 2 positive - HENRI netagive  Meropenem    NANCY on chronic kidney disease stage III  Unsure if it'ss the new baseline  Baseline creatinine previously 1.6-1.8  Good urine output  Renally dose medications  Avoid nephrotoxic agents  Nephrology consulted, appreciate recommendations    Non-insulin-dependent diabetes mellitus type 2  HbA1c 7.1% in 2022  Jardiance  Low-dose insulin sliding scale AC at bedtime    Peripheral artery disease status post PCI of left SFA  Continue aspirin Plavix and statin    Hypertension  Continue beta-blocker and Imdur    COPD, not in exacerbation  On 2 L home oxygen  Continue COPD regimen    Diet ADULT DIET;  Regular; 1000 ml   DVT Prophylaxis [] Lovenox, [x]  Heparin, [] SCDs, [] Ambulation,  [] Eliquis, [] Xarelto  [] Coumadin   Code Status Full Code   Disposition From: Home  Expected Disposition: Home  Estimated Date of Discharge: 2 days  Patient requires continued admission due to await sensitivity on Pseudomonas   Surrogate Decision Maker/ POA Daughter     Subjective:     Chief Complaint: Shortness of breath, right leg pain    Patient was wanting to go home today  No dyspnea at rest reported  Spoke to daughter [employer] at the bedside    Prior notes by colleague    Shan Phillip is a 67 y.o. male who presents with progressively worsening shortness of breath and right foot pain. Patient was seen and evaluated at bedside earlier this morning. Patient was alert oriented x3. Hemodynamically stable. Reported that his shortness of breath is markedly improved. No significant overnight events noticed. Objective: Intake/Output Summary (Last 24 hours) at 11/15/2022 1942  Last data filed at 11/15/2022 1605  Gross per 24 hour   Intake 255 ml   Output 2500 ml   Net -2245 ml          Vitals:   Vitals:    11/15/22 1844   BP: (!) 144/57   Pulse: 63   Resp:    Temp:    SpO2:        Physical Exam:      Physical Exam  Constitutional:       Appearance: He is not ill-appearing. Pulmonary:      Effort: Pulmonary effort is normal.   Neurological:      Cranial Nerves: No cranial nerve deficit.         Medications:   Medications:    amLODIPine  10 mg Oral Daily    [START ON 11/16/2022] meropenem  1,000 mg IntraVENous Q12H    carvedilol  25 mg Oral BID     isosorbide mononitrate  60 mg Oral Daily    heparin (porcine)  5,000 Units SubCUTAneous 3 times per day    furosemide  40 mg IntraVENous BID    empagliflozin  10 mg Oral Daily    sodium hypochlorite   Irrigation Daily    aspirin  81 mg Oral Daily    atorvastatin  40 mg Oral Nightly    clopidogrel  75 mg Oral Daily    magnesium oxide  400 mg Oral BID    tiotropium  2 puff Inhalation Daily    sodium chloride flush  5-40 mL IntraVENous 2 times per day    insulin lispro  0-4 Units SubCUTAneous TID WC    insulin lispro  0-4 Units SubCUTAneous Nightly    metOLazone  2.5 mg Oral BID    collagenase   Topical Daily      Infusions:    sodium chloride      dextrose       PRN Meds: hydrOXYzine HCl, 25 mg, TID PRN  hydrALAZINE, 10 mg, Q6H PRN  albuterol sulfate HFA, 2 puff, Q4H PRN  sodium chloride flush, 5-40 mL, PRN  sodium chloride, , PRN  ondansetron, 4 mg, Q8H PRN   Or  ondansetron, 4 mg, Q6H PRN  polyethylene glycol, 17 g, Daily PRN  acetaminophen, 650 mg, Q6H PRN   Or  acetaminophen, 650 mg, Q6H PRN  glucose, 4 tablet, PRN  dextrose bolus, 125 mL, PRN   Or  dextrose bolus, 250 mL, PRN  glucagon (rDNA), 1 mg, PRN  dextrose, , Continuous PRN      Labs      Recent Results (from the past 24 hour(s))   POCT Glucose    Collection Time: 11/14/22  8:25 PM   Result Value Ref Range    POC Glucose 209 (H) 70 - 99 MG/DL   Vancomycin Level, Random    Collection Time: 11/15/22  5:44 AM   Result Value Ref Range    Vancomycin Rm 23.3 UG/ML    DOSE AMOUNT DOSE AMT.  GIVEN - 1000 mg     DOSE TIME DOSE TIME GIVEN - 0100    Comprehensive Metabolic Panel    Collection Time: 11/15/22  5:44 AM   Result Value Ref Range    Sodium 138 135 - 145 MMOL/L    Potassium 4.4 3.5 - 5.1 MMOL/L    Chloride 99 99 - 110 mMol/L    CO2 32 21 - 32 MMOL/L    BUN 35 (H) 6 - 23 MG/DL    Creatinine 2.3 (H) 0.9 - 1.3 MG/DL    Est, Glom Filt Rate 29 (L) >60 mL/min/1.73m2    Glucose 152 (H) 70 - 99 MG/DL    Calcium 8.7 8.3 - 10.6 MG/DL    Albumin 3.3 (L) 3.4 - 5.0 GM/DL    Total Protein 7.0 6.4 - 8.2 GM/DL    Total Bilirubin 0.9 0.0 - 1.0 MG/DL    ALT 7 (L) 10 - 40 U/L    AST 11 (L) 15 - 37 IU/L    Alkaline Phosphatase 90 40 - 129 IU/L    Anion Gap 7 4 - 16   POCT Glucose    Collection Time: 11/15/22  7:43 AM   Result Value Ref Range    POC Glucose 161 (H) 70 - 99 MG/DL   POCT Glucose    Collection Time: 11/15/22 11:53 AM   Result Value Ref Range    POC Glucose 146 (H) 70 - 99 MG/DL   POCT Glucose    Collection Time: 11/15/22  5:54 PM   Result Value Ref Range POC Glucose 174 (H) 70 - 99 MG/DL        Imaging/Diagnostics Last 24 Hours   No results found.     Electronically signed by Edgar Heller MD on 11/15/2022 at 7:42 PM

## 2022-11-16 NOTE — PROGRESS NOTES
V2.0  [facility] Hospitalist Progress Note      Name:  Deidre Ahumada /Age/Sex: 1950  (67 y.o. male)   MRN & CSN:  [id number] & [id number] Encounter Date/Time: 2022 6:22 PM EST    Location:  [id number]-[id number] PCP: Felice Weller Day: 6    Assessment and Plan:   Deidre Ahumada is a 67 y.o. male with past medical history of peripheral artery disease status post PCI of left SFA, CKD stage III, multiple foot wounds, heart failure with preserved ejection fraction, hypertension, diabetes mellitus type 2, who presents with Acute on chronic diastolic heart failure (HCC)    Acute on chronic hypoxic respiratory failure  Acute on chronic diastolic heart failure exacerbation with significant right heart involvement  Initially was on Lasix 40 mg 3 times daily upon admission, de-escalated to twice daily for volume management  Strict intake and output  Measure daily weight with goal dry weight of 260 pounds  Cardiology cs appreciated    Infected postop wound is possible  E faecalis positive blood culture  Right hallux amputation at [facility] on 10/20/2022 prior admission  Pseudomonas in right foot wound - await sensitivity  Blood cultures 1 out of 2 positive - HENRI netagive  Meropenem  - - discussed with infectious disease. Continue meropenem for now    NANCY on chronic kidney disease stage III  Unsure if it'ss the new baseline  Baseline creatinine previously 1.6-1.8  Good urine output  Renally dose medications  Avoid nephrotoxic agents  Nephrology consulted, appreciate recommendations    Non-insulin-dependent diabetes mellitus type 2  HbA1c 7.1% in 2022  Jardiance  Low-dose insulin sliding scale AC at bedtime    Peripheral artery disease status post PCI of left SFA  Continue aspirin Plavix and statin    Hypertension  Continue beta-blocker and Imdur    COPD, not in exacerbation  On 2 L home oxygen  Continue COPD regimen    Diet ADULT DIET;  Regular; 1000 ml   DVT Prophylaxis [] Lovenox, [x]  Heparin, [] SCDs, [] Ambulation,  [] Eliquis, [] Xarelto  [] Coumadin   Code Status Full Code   Disposition From: Home  Expected Disposition: Home  Estimated Date of Discharge: 1-2 days  Patient requires continued admission due to we will determine the outpatient antibiotic regimen   Surrogate Decision Maker/ POA Daughter     Subjective:     Chief Complaint: Shortness of breath, right leg pain    November 16:    Pseudomonas appears to be pansensitive  Sitting up at edge of bed when seen, denies complaints          November 15:    Patient was wanting to go home today  No dyspnea at rest reported  Spoke to daughter [facility] at the bedside            Prior notes by colleague    Hubert Yip is a 67 y.o. male who presents with progressively worsening shortness of breath and right foot pain. Patient was seen and evaluated at bedside earlier this morning. Patient was alert oriented x3. Hemodynamically stable. Reported that his shortness of breath is markedly improved. No significant overnight events noticed. Objective: Intake/Output Summary (Last 24 hours) at 11/16/2022 1019  Last data filed at 11/16/2022 0617  Gross per 24 hour   Intake 120 ml   Output 2050 ml   Net -1930 ml          Vitals:   Vitals:    11/16/22 0814   BP:    Pulse:    Resp:    Temp:    SpO2: 99%       Physical Exam:      Physical Exam  Constitutional:       Appearance: He is not ill-appearing. Pulmonary:      Effort: Pulmonary effort is normal.   Neurological:      Cranial Nerves: No cranial nerve deficit.         Medications:   Medications:    amLODIPine  10 mg Oral Daily    meropenem  1,000 mg IntraVENous Q12H    carvedilol  25 mg Oral BID WC    isosorbide mononitrate  60 mg Oral Daily    heparin (porcine)  5,000 Units SubCUTAneous 3 times per day    furosemide  40 mg IntraVENous BID    empagliflozin  10 mg Oral Daily    sodium hypochlorite   Irrigation Daily    aspirin  81 mg Oral Daily    atorvastatin  40 mg Oral Nightly    clopidogrel 75 mg Oral Daily    magnesium oxide  400 mg Oral BID    tiotropium  2 puff Inhalation Daily    sodium chloride flush  5-40 mL IntraVENous 2 times per day    insulin lispro  0-4 Units SubCUTAneous TID WC    insulin lispro  0-4 Units SubCUTAneous Nightly    metOLazone  2.5 mg Oral BID    collagenase   Topical Daily      Infusions:    sodium chloride      dextrose       PRN Meds: hydrOXYzine HCl, 25 mg, TID PRN  hydrALAZINE, 10 mg, Q6H PRN  albuterol sulfate HFA, 2 puff, Q4H PRN  sodium chloride flush, 5-40 mL, PRN  sodium chloride, , PRN  ondansetron, 4 mg, Q8H PRN   Or  ondansetron, 4 mg, Q6H PRN  polyethylene glycol, 17 g, Daily PRN  acetaminophen, 650 mg, Q6H PRN   Or  acetaminophen, 650 mg, Q6H PRN  glucose, 4 tablet, PRN  dextrose bolus, 125 mL, PRN   Or  dextrose bolus, 250 mL, PRN  glucagon (rDNA), 1 mg, PRN  dextrose, , Continuous PRN      Labs      Recent Results (from the past 24 hour(s))   POCT Glucose    Collection Time: 11/15/22 11:53 AM   Result Value Ref Range    POC Glucose 146 (H) 70 - 99 MG/DL   POCT Glucose    Collection Time: 11/15/22  5:54 PM   Result Value Ref Range    POC Glucose 174 (H) 70 - 99 MG/DL   POCT Glucose    Collection Time: 11/15/22  7:57 PM   Result Value Ref Range    POC Glucose 243 (H) 70 - 99 MG/DL        Imaging/Diagnostics Last 24 Hours   No results found.     Electronically signed by Sara Greenwood MD on 11/16/2022 at 10:19 AM

## 2022-11-16 NOTE — PROGRESS NOTES
Cardiology Progress Note     Admit Date:  11/11/2022    Consult reason/ Seen today for :       Subjective and  Overnight Events : He would like to be discharged he says he is feeling better   blood cultures were positive for Enterococcus , HENRI did not show any significant valvular disease  leg swelling is improved, Bp is much improved and better        Chief complain on admission : 67 y. o.year old who is admitted for  Chief Complaint   Patient presents with    Leg Pain    Post-op Problem     Right foot      Assessment / Plan:  Enterococcus bacteremia , HENRI negative for vegetation no abnormality seen on the pacer wire either to biotics as per primary team  Echo is concerning for right heart failure with dilated right side and RVSP in 80s bilateral lower extremity swelling and scrotal swelling  Lasix stopped and switched to torsemide with metolazone by nephrology  History of coronary artery disease stable  CHF: Heart failure with preserved EF abnormal troponin in the setting of abnormal kidney function  Titrate medication depending on blood pressure response  HTN: Increase  coreg 25 mg bid add hydralazine 25 3 times daily  Historically non  compliance is also been an issue  Multiple leg wounds ulcers skin tears due to venous congestion wound care as per primary team   severe arterial  disease on Doppler , s/p toe amputation s/p PCI earlier month ago continue aspirin Plavix  DVT prophylaxis if no contraindication  6.    Dyslipidemia: continue statins     Past medical history:    has a past medical history of Acid reflux, Acute MI ([address] Arthritis, Broken teeth, CAD (coronary artery disease), Cardiomyopathy ([address] CHF (congestive heart failure) ([address] Chronic back pain, Chronic kidney disease, Diabetes mellitus ([address] Diabetic neuropathy ([address] H/O cardiovascular stress test, H/O Doppler ultrasound, H/O percutaneous left heart catheterization, History of irregular heartbeat, History of syncope, Hyperlipidemia, Hypertension, Leg swelling, Necrotic toes (HCC), Neuropathy, neuropathy, PAD (peripheral artery disease) ([facility] PVD (peripheral vascular disease) ([facility] Sick sinus syndrome ([facility] Sleep apnea, Spinal stenosis, Teeth missing, Type 2 diabetes mellitus without complication ([facility] and WD-Chronic foot ulcer, left, with necrosis of bone ([facility] Past surgical history:   has a past surgical history that includes Coronary angioplasty with stent; Dental surgery; Colonoscopy (08/04/2016); pacemaker placement (06/04/2010); vascular surgery; colectomy (Right, 08/26/2016); Toe amputation (Right, 09/12/2017); Toe amputation (Right, 01/09/2018); Cardiac catheterization; Cardiac defibrillator placement (06/04/2010); Coronary angioplasty; Cardiac catheterization (07/14/2017); Cardiac catheterization (11/20/2018); Toe amputation (Left, 12/26/2020); IR TUNNELED CVC PLACE WO SQ PORT/PUMP > 5 YEARS (6/14/2021); Toe amputation (Left, 7/26/2022); and Toe amputation (Right, 10/20/2022). Social History:   reports that he quit smoking about 12 months ago. His smoking use included cigars and cigarettes. He started smoking about 42 years ago. He has a 9.00 pack-year smoking history. He has never used smokeless tobacco. He reports current drug use. Drug: Marijuana [place] Ege). He reports that he does not drink alcohol. Family history:  family history includes Cancer in his brother, father, and son; Diabetes in his brother, mother, and sister; Early Death (age of onset: 62) in his sister; Heart Disease in his brother and sister; High Blood Pressure in his brother, brother, and mother; Neuropathy in his sister; Other in his sister; Stroke in his mother; Vision Loss in his mother.     Allergies   Allergen Reactions    Pcn [Penicillins] Hives    Fentanyl Itching       Review of Systems:    All 14 systems were reviewed and are negative  Except for the positive findings  which as documented     BP (!) 144/59   Pulse 60   Temp 97.5 °F (36.4 °C) (Oral)   Resp 16   Ht 6' (1.829 m)   Wt 261 lb (118.4 kg)   SpO2 93%   BMI 35.40 kg/m²     Intake/Output Summary (Last 24 hours) at 11/16/2022 1827  Last data filed at 11/16/2022 1131  Gross per 24 hour   Intake --   Output 2000 ml   Net -2000 ml     Physical Exam:  Constitutional:  Well developed, Well nourished, No acute distress, Non-toxic appearance. HENT:  Normocephalic, Atraumatic, Bilateral external ears normal, Oropharynx moist, No oral exudates, Nose normal. Neck- Normal range of motion, No tenderness, Supple, No stridor. Eyes:  PERRL, EOMI, Conjunctiva normal, No discharge. Respiratory:  Normal breath sounds, No respiratory distress, No wheezing, No chest tenderness. Cardiovascular:  Normal heart rate, Normal rhythm, No murmurs, No rubs, No gallops, JVP not elevated  Abdomen/GI:  Bowel sounds normal, Soft, No tenderness, No masses, No pulsatile masses. Musculoskeletal:  Intact distal pulses, No edema, No tenderness, No cyanosis, No clubbing. Good range of motion in all major joints. No tenderness to palpation or major deformities noted. Back- No tenderness. Integument:  Warm, Dry, No erythema, No rash. Lymphatic:  No lymphadenopathy noted. Neurologic:  Alert & oriented x 3, Normal motor function, Normal sensory function, No focal deficits noted.    Psychiatric:  Affect  and  Mood :no change    Medications:    torsemide  50 mg Oral BID    amLODIPine  10 mg Oral Daily    meropenem  1,000 mg IntraVENous Q12H    carvedilol  25 mg Oral BID     isosorbide mononitrate  60 mg Oral Daily    heparin (porcine)  5,000 Units SubCUTAneous 3 times per day    empagliflozin  10 mg Oral Daily    sodium hypochlorite   Irrigation Daily    aspirin  81 mg Oral Daily    atorvastatin  40 mg Oral Nightly    clopidogrel  75 mg Oral Daily    magnesium oxide  400 mg Oral BID    tiotropium  2 puff Inhalation Daily    sodium chloride flush  5-40 mL IntraVENous 2 times per day    insulin lispro  0-4 Units SubCUTAneous TID WC    insulin lispro  0-4 Units SubCUTAneous Nightly    metOLazone  2.5 mg Oral BID    collagenase   Topical Daily      sodium chloride      dextrose       hydrOXYzine HCl, hydrALAZINE, albuterol sulfate HFA, sodium chloride flush, sodium chloride, ondansetron **OR** ondansetron, polyethylene glycol, acetaminophen **OR** acetaminophen, glucose, dextrose bolus **OR** dextrose bolus, glucagon (rDNA), dextrose    Lab Data:  CBC:   Recent Labs     11/14/22  0955 11/16/22  1756   WBC 4.7 5.1   HGB 8.7* 8.9*   HCT 28.6* 29.3*   MCV 87.5 87.2    196     BMP:   Recent Labs     11/14/22  0955 11/15/22  0544    138   K 4.6 4.4   CL 99 99   CO2 29 32   BUN 34* 35*   CREATININE 2.3* 2.3*     PT/INR: No results for input(s): PROTIME, INR in the last 72 hours. BNP:    No results for input(s): PROBNP in the last 72 hours. TROPONIN:   No results for input(s): TROPONINT in the last 72 hours. ECHO :   echocardiogram    Assessment:  67 y. o.year old who is admitted for  Chief Complaint   Patient presents with    Leg Pain    Post-op Problem     Right foot    , active issues as noted below:  Impression:  Principal Problem:    Acute on chronic diastolic heart failure (HCC)  Active Problems:    CHF (congestive heart failure), NYHA class I, acute on chronic, combined (HCC)    Acute on chronic diastolic CHF (congestive heart failure) ([facility])    Leg swelling    Skin ulcer of left foot including toes with fat layer exposed ([address]    Superficial incisional surgical site infection    Bacteremia due to Enterococcus    Biventricular ICD (implantable cardioverter-defibrillator) in place    WD-PVD (peripheral vascular disease) ([address]  Resolved Problems:    * No resolved hospital problems.  *            All labs, medications and tests reviewed by myself , continue all other medications of all above medical condition listed as is except for changes mentioned above.    Thank you very much for consult , please call with questions.     Betty Du MD, MD 11/16/2022 6:27 PM

## 2022-11-16 NOTE — PROGRESS NOTES
Nephrology Progress Note  11/16/2022 10:59 AM        Subjective:   Admit Date: 11/11/2022  PCP: Tyrese Hyatt    Interval History: Patient seen in early morning, this is a late entry      Diet: Reasonable  He was n.p.o. yesterday for test    ROS: No shortness of breath or confusion  Underwent HENRI-no overt vegetation  Urine output slowed down but still 2 L/day  No fever and acceptable blood pressure    Data:     Current meds:    amLODIPine  10 mg Oral Daily    meropenem  1,000 mg IntraVENous Q12H    carvedilol  25 mg Oral BID WC    isosorbide mononitrate  60 mg Oral Daily    heparin (porcine)  5,000 Units SubCUTAneous 3 times per day    furosemide  40 mg IntraVENous BID    empagliflozin  10 mg Oral Daily    sodium hypochlorite   Irrigation Daily    aspirin  81 mg Oral Daily    atorvastatin  40 mg Oral Nightly    clopidogrel  75 mg Oral Daily    magnesium oxide  400 mg Oral BID    tiotropium  2 puff Inhalation Daily    sodium chloride flush  5-40 mL IntraVENous 2 times per day    insulin lispro  0-4 Units SubCUTAneous TID WC    insulin lispro  0-4 Units SubCUTAneous Nightly    metOLazone  2.5 mg Oral BID    collagenase   Topical Daily      sodium chloride      dextrose           I/O last 3 completed shifts: In: 255 [P.O.:240;  I.V.:15]  Out: 3850 [Urine:3850]    CBC:   Recent Labs     11/13/22  1636 11/14/22  0955   WBC 4.4 4.7   HGB 8.6* 8.7*    167          Recent Labs     11/14/22  0955 11/15/22  0544    138   K 4.6 4.4   CL 99 99   CO2 29 32   BUN 34* 35*   CREATININE 2.3* 2.3*   GLUCOSE 135* 152*       Lab Results   Component Value Date    CALCIUM 8.7 11/15/2022    PHOS 3.8 11/13/2022       Objective:     Vitals: /66   Pulse 64   Temp 97.5 °F (36.4 °C) (Oral)   Resp 14   Ht 6' (1.829 m)   Wt 261 lb (118.4 kg)   SpO2 99%   BMI 35.40 kg/m² ,    General appearance: Alert, awake and oriented-wants to go home  HEENT: At least 2+ conjunctival pallor  Neck: Supple  Lungs: Coarse breath sound but no gross crackles  Heart: Regular rate and rhythm, chest wall implanted cardiac device  Abdomen: Soft, nontender  Extremities: Edema better  Chronic bilateral leg wound      Problem List :         Impression :     Acute kidney injury with underlying CKD stage G3 B A3-creatinine was 2.3 yesterday-mainly from cardiorenal syndrome and perhaps indirectly from infection if he has any  Acute decompensated heart failure/fluid overload much better now  Diabetes/proteinuria and chronic leg wound  Underlying hypertension    Recommendation/Plan  :     Okay to discharge from my standpoint  Recommend discharging with SGLT2 inhibitors and torsemide 50 twice daily for the time being-but he will need adjustment of diuretics for rest of his life-unfortunately also has high risk of kidney disease progression needing kidney replacement therapy at some point-the goal would be to retard the kidney disease progression as much as possible-I will try heart and kidney protective medication as an outpatient sequentially  He will need some lab and follow-up with me in couple of weeks  Follow clinically      Shellie Alicea MD MD

## 2022-11-16 NOTE — CARE COORDINATION
CM in to see pt to follow up on discharge planning. Plan remains home with family and [facility]. Pt denies any needs at this time.       CM following

## 2022-11-16 NOTE — PROGRESS NOTES
Infectious Disease Progress Note  2022   Patient Name: Neris Powell : 1950     Assessment  Enterococcus faecalis bacteremia  Afebrile  HENRI done, no vegetations  Repeat blood cultures from 2022, 0/2 NGTD  Probable right foot osteomyelitis  Nonhealing wound. Culture positive for Pseudomonas aeruginosa and group B streptococcus  Improving, CRP on a downward trend  Penicillin allergy  Reported as hives; he is uncertain about when this occurred. Penicillin will be avoided. He tolerates cephalosporins and carbapenems  Diastolic CHF  Right-sided heart failure  Pacemaker  Comorbid conditions: Diabetes mellitus with diabetic polyneuropathy, CKD stage III, obesity, history of diabetic foot ulcers     Plan  Therapeutic: Continue meropenem 1 g every 8 hours for 6 weeks (2022)  Diagnostic: Trend CRP and procalcitonin  F/u:   Other: We will check with nephrology if PICC line or tunneled PICC line is treatable    Reason for visit: F/u Enterococcus faecalis bacteremia; History:? Interval history noted  Denies n/v/d/f or untoward effects of antimicrobials  Physical Exam:  Vital Signs: /62   Pulse 60   Temp 97.5 °F (36.4 °C) (Oral)   Resp 12   Ht 6' (1.829 m)   Wt 261 lb (118.4 kg)   SpO2 93%   BMI 35.40 kg/m²     Gen: alert and oriented X3, no distress  Skin: no stigmata of endocarditis  Wounds: Bilateral feet dressing C/D/I  HEMT: AT/NC Oropharynx pink, moist, and without lesions or exudates; dentition in poor state of repair  Eyes: PERRLA, EOMI, conjunctiva pink, sclera anicteric. Neck: Supple. Trachea midline. No LAD. Chest: no distress and CTA. Good air movement. Heart: RRR and no MRG. Abd: soft, non-distended, no tenderness, no hepatomegaly. Normoactive bowel sounds. Ext: no clubbing, cyanosis, or edema  Catheter Site: without erythema or tenderness  LDA:   Neuro: Mental status intact.  CN 2-12 intact and no focal sensory or motor deficits     Radiologic / Imaging / TESTING  No results found.      Labs:    Recent Results (from the past 24 hour(s))   POCT Glucose    Collection Time: 11/15/22  5:54 PM   Result Value Ref Range    POC Glucose 174 (H) 70 - 99 MG/DL   POCT Glucose    Collection Time: 11/15/22  7:57 PM   Result Value Ref Range    POC Glucose 243 (H) 70 - 99 MG/DL   POCT Glucose    Collection Time: 11/16/22 11:55 AM   Result Value Ref Range    POC Glucose 187 (H) 70 - 99 MG/DL     CULTURE results: Invalid input(s): BLOOD CULTURE,  URINE CULTURE, SURGICAL CULTURE    Diagnosis:  Patient Active Problem List   Diagnosis    PAD (peripheral artery disease) ([facility])    Chronic coronary artery disease    Biventricular ICD (implantable cardioverter-defibrillator) in place    Chronic combined systolic and diastolic heart failure ([facility])    Chronic kidney disease, stage III (moderate) ([facility])    Mixed hyperlipidemia    Sick sinus syndrome ([facility])    Type 2 diabetes mellitus with diabetic polyneuropathy ([facility]    Spinal stenosis of lumbar region    Obesity, Class I, BMI 30-34.9    S/P partial colectomy    Tubulovillous adenoma of colon    Microalbuminuria    WD-PVD (peripheral vascular disease) ([facility])    Limb ischemia    Necrotic toes ([facility])    Toe gangrene ([facility])    Diabetic foot infection ([facility])    Chronic kidney disease (CKD) stage G3a/A2, moderately decreased glomerular filtration rate (GFR) between 45-59 mL/min/1.73 square meter and albuminuria creatinine ratio between  mg/g ([facility])    Edema    ICD (implantable cardioverter-defibrillator) battery depletion    Hyperkalemia    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[facility])    Ischemia of toe    Acute kidney injury ([facility])    Fluid overload    DM (diabetes mellitus) ([facility])    Precordial pain    Acute chest pain    Unstable angina ([facility])    Chronic kidney disease (CKD) stage G3a/A3, moderately decreased glomerular filtration rate (GFR) between 45-59 mL/min/1.73 square meter and albuminuria creatinine ratio greater than 300 mg/g ([facility])    Cardiomyopathy ([facility])    Diabetic neuropathy ([address]    HTN (hypertension)    Epigastric pain    Acute on chronic congestive heart failure (HCC)    Leg edema    Acute on chronic systolic CHF (congestive heart failure) ([address]    Diabetic foot ulcer with osteomyelitis ([address]    Visit for wound check    Moderate malnutrition ([address]    Long term (current) use of antibiotics    WD-Diabetic ulcer of toe of right foot associated with type 2 diabetes mellitus, with fat layer exposed ([address]    Diabetic ulcer of toe associated with type 2 diabetes mellitus, with bone involvement without evidence of necrosis ([address]    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    Persistent wound pain    Receiving intravenous antibiotic treatment as outpatient    Acute on chronic respiratory failure with hypoxemia ([address]    WD-Chronic foot ulcer, left, with necrosis of bone ([facility])    Acute on chronic HFrEF (heart failure with reduced ejection fraction) ([facility])    Scrotal edema    Hypertensive urgency    Diabetic ulcer of right foot due to type 2 diabetes mellitus ([address]    Cellulitis of foot    Toe osteomyelitis (HCC)    Heart failure exacerbated by sotalol ([facility])    CHF (congestive heart failure), NYHA class I, acute on chronic, combined ([facility])    Acute on chronic diastolic CHF (congestive heart failure) ([facility])    Leg swelling    Acute on chronic diastolic heart failure (HCC)    Skin ulcer of left foot including toes with fat layer exposed ([address]    Superficial incisional surgical site infection    Bacteremia due to Enterococcus       Active Problems  Principal Problem:    Acute on chronic diastolic heart failure (HCC)  Active Problems:    CHF (congestive heart failure), NYHA class I, acute on chronic, combined ([facility])    Acute on chronic diastolic CHF (congestive heart failure) ([facility])    Leg swelling    Skin ulcer of left foot including toes with fat layer exposed ([address]    Superficial incisional surgical site infection    Bacteremia due to Enterococcus    Biventricular ICD (implantable cardioverter-defibrillator) in place WD-PVD (peripheral vascular disease) ([facility]  Resolved Problems:    * No resolved hospital problems.  *              Electronically signed by: Electronically signed by Jose Velasquez MD on 11/16/2022 at 2:59 PM

## 2022-11-17 ENCOUNTER — APPOINTMENT (OUTPATIENT)
Dept: INTERVENTIONAL RADIOLOGY/VASCULAR | Age: 72
DRG: 280 | End: 2022-11-17
Payer: MEDICARE

## 2022-11-17 LAB
ALBUMIN SERPL-MCNC: 3.4 GM/DL (ref 3.4–5)
ALP BLD-CCNC: 100 IU/L (ref 40–129)
ALT SERPL-CCNC: 6 U/L (ref 10–40)
ANION GAP SERPL CALCULATED.3IONS-SCNC: 10 MMOL/L (ref 4–16)
APTT: 38.7 SECONDS (ref 25.1–37.1)
AST SERPL-CCNC: 11 IU/L (ref 15–37)
BILIRUB SERPL-MCNC: 0.8 MG/DL (ref 0–1)
BUN BLDV-MCNC: 38 MG/DL (ref 6–23)
C-REACTIVE PROTEIN, HIGH SENSITIVITY: 20.6 MG/L
CALCIUM SERPL-MCNC: 8.7 MG/DL (ref 8.3–10.6)
CHLORIDE BLD-SCNC: 94 MMOL/L (ref 99–110)
CO2: 34 MMOL/L (ref 21–32)
CREAT SERPL-MCNC: 2.3 MG/DL (ref 0.9–1.3)
GFR SERPL CREATININE-BSD FRML MDRD: 29 ML/MIN/1.73M2
GLUCOSE BLD-MCNC: 111 MG/DL (ref 70–99)
GLUCOSE BLD-MCNC: 154 MG/DL (ref 70–99)
GLUCOSE BLD-MCNC: 178 MG/DL (ref 70–99)
GLUCOSE BLD-MCNC: 213 MG/DL (ref 70–99)
GLUCOSE BLD-MCNC: 283 MG/DL (ref 70–99)
HCT VFR BLD CALC: 28.5 % (ref 42–52)
HEMOGLOBIN: 8.7 GM/DL (ref 13.5–18)
INR BLD: 1.02 INDEX
MCH RBC QN AUTO: 26.4 PG (ref 27–31)
MCHC RBC AUTO-ENTMCNC: 30.5 % (ref 32–36)
MCV RBC AUTO: 86.4 FL (ref 78–100)
PDW BLD-RTO: 17.2 % (ref 11.7–14.9)
PLATELET # BLD: 195 K/CU MM (ref 140–440)
PMV BLD AUTO: 9.5 FL (ref 7.5–11.1)
POTASSIUM SERPL-SCNC: 4.4 MMOL/L (ref 3.5–5.1)
PROTHROMBIN TIME: 13.1 SECONDS (ref 11.7–14.5)
RBC # BLD: 3.3 M/CU MM (ref 4.6–6.2)
SODIUM BLD-SCNC: 138 MMOL/L (ref 135–145)
TOTAL PROTEIN: 7 GM/DL (ref 6.4–8.2)
WBC # BLD: 5 K/CU MM (ref 4–10.5)

## 2022-11-17 PROCEDURE — 76937 US GUIDE VASCULAR ACCESS: CPT

## 2022-11-17 PROCEDURE — 2580000003 HC RX 258: Performed by: INTERNAL MEDICINE

## 2022-11-17 PROCEDURE — 6370000000 HC RX 637 (ALT 250 FOR IP): Performed by: INTERNAL MEDICINE

## 2022-11-17 PROCEDURE — 36558 INSERT TUNNELED CV CATH: CPT

## 2022-11-17 PROCEDURE — 6360000002 HC RX W HCPCS: Performed by: INTERNAL MEDICINE

## 2022-11-17 PROCEDURE — 85027 COMPLETE CBC AUTOMATED: CPT

## 2022-11-17 PROCEDURE — 0JH63XZ INSERTION OF TUNNELED VASCULAR ACCESS DEVICE INTO CHEST SUBCUTANEOUS TISSUE AND FASCIA, PERCUTANEOUS APPROACH: ICD-10-PCS | Performed by: INTERNAL MEDICINE

## 2022-11-17 PROCEDURE — 36415 COLL VENOUS BLD VENIPUNCTURE: CPT

## 2022-11-17 PROCEDURE — B543ZZA ULTRASONOGRAPHY OF RIGHT JUGULAR VEINS, GUIDANCE: ICD-10-PCS | Performed by: INTERNAL MEDICINE

## 2022-11-17 PROCEDURE — 85730 THROMBOPLASTIN TIME PARTIAL: CPT

## 2022-11-17 PROCEDURE — 82962 GLUCOSE BLOOD TEST: CPT

## 2022-11-17 PROCEDURE — 86140 C-REACTIVE PROTEIN: CPT

## 2022-11-17 PROCEDURE — 2500000003 HC RX 250 WO HCPCS

## 2022-11-17 PROCEDURE — 2580000003 HC RX 258: Performed by: STUDENT IN AN ORGANIZED HEALTH CARE EDUCATION/TRAINING PROGRAM

## 2022-11-17 PROCEDURE — 02HV33Z INSERTION OF INFUSION DEVICE INTO SUPERIOR VENA CAVA, PERCUTANEOUS APPROACH: ICD-10-PCS | Performed by: INTERNAL MEDICINE

## 2022-11-17 PROCEDURE — 6360000002 HC RX W HCPCS

## 2022-11-17 PROCEDURE — 2140000000 HC CCU INTERMEDIATE R&B

## 2022-11-17 PROCEDURE — 6370000000 HC RX 637 (ALT 250 FOR IP): Performed by: FAMILY MEDICINE

## 2022-11-17 PROCEDURE — 77001 FLUOROGUIDE FOR VEIN DEVICE: CPT

## 2022-11-17 PROCEDURE — 85610 PROTHROMBIN TIME: CPT

## 2022-11-17 PROCEDURE — C1751 CATH, INF, PER/CENT/MIDLINE: HCPCS

## 2022-11-17 PROCEDURE — 6370000000 HC RX 637 (ALT 250 FOR IP): Performed by: STUDENT IN AN ORGANIZED HEALTH CARE EDUCATION/TRAINING PROGRAM

## 2022-11-17 PROCEDURE — 80053 COMPREHEN METABOLIC PANEL: CPT

## 2022-11-17 RX ORDER — OXYCODONE HYDROCHLORIDE AND ACETAMINOPHEN 5; 325 MG/1; MG/1
1 TABLET ORAL ONCE
Status: COMPLETED | OUTPATIENT
Start: 2022-11-17 | End: 2022-11-17

## 2022-11-17 RX ORDER — GABAPENTIN 300 MG/1
300 CAPSULE ORAL 3 TIMES DAILY
Status: DISCONTINUED | OUTPATIENT
Start: 2022-11-17 | End: 2022-11-18

## 2022-11-17 RX ORDER — OXYCODONE HYDROCHLORIDE AND ACETAMINOPHEN 5; 325 MG/1; MG/1
1 TABLET ORAL ONCE
Status: DISCONTINUED | OUTPATIENT
Start: 2022-11-17 | End: 2022-11-17

## 2022-11-17 RX ORDER — TRAZODONE HYDROCHLORIDE 50 MG/1
50 TABLET ORAL ONCE
Status: COMPLETED | OUTPATIENT
Start: 2022-11-17 | End: 2022-11-17

## 2022-11-17 RX ADMIN — TORSEMIDE 50 MG: 20 TABLET ORAL at 21:03

## 2022-11-17 RX ADMIN — METOLAZONE 2.5 MG: 2.5 TABLET ORAL at 21:03

## 2022-11-17 RX ADMIN — ATORVASTATIN CALCIUM 40 MG: 40 TABLET, FILM COATED ORAL at 21:03

## 2022-11-17 RX ADMIN — MAGNESIUM OXIDE TAB 400 MG (241.3 MG ELEMENTAL MG) 400 MG: 400 (241.3 MG) TAB at 21:03

## 2022-11-17 RX ADMIN — TORSEMIDE 50 MG: 20 TABLET ORAL at 09:52

## 2022-11-17 RX ADMIN — MAGNESIUM OXIDE TAB 400 MG (241.3 MG ELEMENTAL MG) 400 MG: 400 (241.3 MG) TAB at 09:53

## 2022-11-17 RX ADMIN — METOLAZONE 2.5 MG: 2.5 TABLET ORAL at 09:53

## 2022-11-17 RX ADMIN — COLLAGENASE SANTYL: 250 OINTMENT TOPICAL at 13:50

## 2022-11-17 RX ADMIN — ASPIRIN 81 MG CHEWABLE TABLET 81 MG: 81 TABLET CHEWABLE at 09:53

## 2022-11-17 RX ADMIN — SODIUM CHLORIDE, PRESERVATIVE FREE 10 ML: 5 INJECTION INTRAVENOUS at 21:06

## 2022-11-17 RX ADMIN — TRAZODONE HYDROCHLORIDE 50 MG: 50 TABLET ORAL at 21:04

## 2022-11-17 RX ADMIN — OXYCODONE HYDROCHLORIDE AND ACETAMINOPHEN 1 TABLET: 5; 325 TABLET ORAL at 05:04

## 2022-11-17 RX ADMIN — CLOPIDOGREL BISULFATE 75 MG: 75 TABLET ORAL at 09:53

## 2022-11-17 RX ADMIN — Medication: at 13:50

## 2022-11-17 RX ADMIN — CARVEDILOL 25 MG: 25 TABLET, FILM COATED ORAL at 09:53

## 2022-11-17 RX ADMIN — GABAPENTIN 300 MG: 300 CAPSULE ORAL at 21:03

## 2022-11-17 RX ADMIN — MEROPENEM 1000 MG: 1 INJECTION, POWDER, FOR SOLUTION INTRAVENOUS at 14:44

## 2022-11-17 RX ADMIN — CARVEDILOL 25 MG: 25 TABLET, FILM COATED ORAL at 16:39

## 2022-11-17 RX ADMIN — ISOSORBIDE MONONITRATE 60 MG: 30 TABLET, EXTENDED RELEASE ORAL at 09:53

## 2022-11-17 RX ADMIN — INSULIN LISPRO 2 UNITS: 100 INJECTION, SOLUTION INTRAVENOUS; SUBCUTANEOUS at 17:25

## 2022-11-17 RX ADMIN — EMPAGLIFLOZIN 10 MG: 10 TABLET, FILM COATED ORAL at 09:52

## 2022-11-17 RX ADMIN — AMLODIPINE BESYLATE 10 MG: 10 TABLET ORAL at 09:53

## 2022-11-17 RX ADMIN — SODIUM CHLORIDE, PRESERVATIVE FREE 10 ML: 5 INJECTION INTRAVENOUS at 09:54

## 2022-11-17 ASSESSMENT — PAIN SCALES - GENERAL
PAINLEVEL_OUTOF10: 0
PAINLEVEL_OUTOF10: 9
PAINLEVEL_OUTOF10: 0
PAINLEVEL_OUTOF10: 6

## 2022-11-17 NOTE — PLAN OF CARE
Problem: Discharge Planning  Goal: Discharge to home or other facility with appropriate resources  Outcome: Progressing     Problem: Safety - Adult  Goal: Free from fall injury  Outcome: Progressing     Problem: ABCDS Injury Assessment  Goal: Absence of physical injury  Outcome: Progressing     Problem: Skin/Tissue Integrity  Goal: Absence of new skin breakdown  Description: 1. Monitor for areas of redness and/or skin breakdown  2. Assess vascular access sites hourly  3. Every 4-6 hours minimum:  Change oxygen saturation probe site  4. Every 4-6 hours:  If on nasal continuous positive airway pressure, respiratory therapy assess nares and determine need for appliance change or resting period.   Outcome: Progressing     Problem: Chronic Conditions and Co-morbidities  Goal: Patient's chronic conditions and co-morbidity symptoms are monitored and maintained or improved  Outcome: Progressing     Problem: Pain  Goal: Verbalizes/displays adequate comfort level or baseline comfort level  Outcome: Progressing     Problem: Respiratory - Adult  Goal: Achieves optimal ventilation and oxygenation  Outcome: Progressing     Problem: Musculoskeletal - Adult  Goal: Return mobility to safest level of function  Outcome: Progressing  Goal: Return ADL status to a safe level of function  Outcome: Progressing     Problem: Infection - Adult  Goal: Absence of infection at discharge  Outcome: Progressing     Problem: Metabolic/Fluid and Electrolytes - Adult  Goal: Electrolytes maintained within normal limits  Outcome: Progressing     Problem: Nutrition Deficit:  Goal: Optimize nutritional status  Outcome: Progressing

## 2022-11-17 NOTE — PROGRESS NOTES
ID recommending home antibiotics tid, next home dose would be at 10pm today. Per CM, home care cannot initiate the antibiotics same day unless orders are sent before non that day. Plan to discharge tomorrow. Patient updated.

## 2022-11-17 NOTE — PROGRESS NOTES
PROCEDURE PERFORMED: Tunneled PICC line by Dr. Zoila Bennett: Medication use    INFORMED CONSENT:  Obtained prior to procedure by Dr. Lloyd Ortega                                              Consent placed in chart. TIME IN ROOM: 1236    ASSESSMENT: Patient alert and oriented. Unaware of procedure to be done. Patient educated on procedure and is in agreement to have PICC placed    BARRIER PRECAUTIONS & STERILE TECHNIQUE:               Pt transferred to the table and positioned supine for comfort. Warm blankets given. Pt placed on Cardiac Monitor. Pt prepped and draped in a sterile fashion with chlorhexadine. TIME OUT: 1254    PAIN/LOCAL ANESTHESIA/SEDATION MANAGEMENT:           Local: Lidocaine 1% given by Dr. Shy Bolden:           ACCESS TIME: 1233 with One Step          US/FLUORO: US guided with  3 images taken          WIRE USED:  J-loop          CATHETER USED: 6French x 60cm tunneled PICC            1258--HR-59, R-22, /79, O2 sat 95% on room air    1307--HR 59, R-22, /69, O2 sat on 96% on room air    1308--Catheter cut at 25cm    1314--HR-59, R-20, BP-151/74, O2 sat 95% on room air. Lumen returns brisk blood return and flushed with Saline. STERILE DRESSINGS: biopatch and tegaderm over catheter site.   Skin glue and band aid over insertion site    EBL:    less than 1 cc      STAFF IN ROOM: Dr. Jake Desir RN, Wisam Brar RN      REPORT CALLED TO: Mauri MORLEY RN    OUT OF ROOM AT:  [id number]

## 2022-11-17 NOTE — PROGRESS NOTES
Nephrology Progress Note  11/17/2022 8:26 AM        Subjective:   Admit Date: 11/11/2022  PCP: Helga Morton    Interval History: No major event overnight    Diet: Reasonable    ROS: Reported good urine output and polyuria  Urine output recorded 2.7 L for the last 24 hours  Need manual blood pressure, variable number, no fever    Data:     Current meds:    torsemide  50 mg Oral BID    amLODIPine  10 mg Oral Daily    meropenem  1,000 mg IntraVENous Q12H    carvedilol  25 mg Oral BID WC    isosorbide mononitrate  60 mg Oral Daily    heparin (porcine)  5,000 Units SubCUTAneous 3 times per day    empagliflozin  10 mg Oral Daily    sodium hypochlorite   Irrigation Daily    aspirin  81 mg Oral Daily    atorvastatin  40 mg Oral Nightly    clopidogrel  75 mg Oral Daily    magnesium oxide  400 mg Oral BID    tiotropium  2 puff Inhalation Daily    sodium chloride flush  5-40 mL IntraVENous 2 times per day    insulin lispro  0-4 Units SubCUTAneous TID WC    insulin lispro  0-4 Units SubCUTAneous Nightly    metOLazone  2.5 mg Oral BID    collagenase   Topical Daily      sodium chloride      dextrose           I/O last 3 completed shifts:   In: 100 [P.O.:100]  Out: 4050 [Urine:4050]    CBC:   Recent Labs     11/14/22  0955 11/16/22  1756 11/17/22  0445   WBC 4.7 5.1 5.0   HGB 8.7* 8.9* 8.7*    196 195          Recent Labs     11/15/22  0544 11/16/22  1756 11/17/22  0445    137 138   K 4.4 4.3 4.4   CL 99 93* 94*   CO2 32 33* 34*   BUN 35* 36* 38*   CREATININE 2.3* 2.2* 2.3*   GLUCOSE 152* 172* 111*       Lab Results   Component Value Date    CALCIUM 8.7 11/17/2022    PHOS 3.8 11/13/2022       Objective:     Vitals: BP (!) 166/87   Pulse 59   Temp 97.6 °F (36.4 °C) (Oral)   Resp 16   Ht 6' (1.829 m)   Wt 261 lb (118.4 kg)   SpO2 95%   BMI 35.40 kg/m² ,    General appearance: Alert, awake and oriented and looking forward to go home  HEENT: 1+ conjunctival pallor  Neck: Supple  Lungs: Coarse breath sound gross crackles  Heart: Seemed regular rhythm this morning,  Abdomen: Soft  Extremities: Edema improving, chronic wound      Problem List :         Impression :     Acute kidney injury on top of CKD stage G3 B A3-stable from renal vein congestion and infection  Acute decompensated heart failure with underlying atherosclerotic cardiovascular disease better now  Diabetes with proteinuria as well as hypertension  Chronic skin, soft tissue and perhaps bone infection-also has Enterococcus faecalis bacteremia/sepsis    Recommendation/Plan  :     Okay to discharge from my standpoint  He is doing reasonably well with SGLT2 and torsemide 50 p.o. twice daily and metolazone 2.5 daily  I would avoid PICC-but right IJ tunneled PICC by interventional radiology would be appropriate-he has high risk for kidney disease progression needing kidney replacement therapy-likely not an ideal candidate for transplantation-need good glycemic control and close follow-up with me at the lab mainly CMP, magnesium and phosphorus and CBC differential prior to appointment with me      Jami Otero MD MD

## 2022-11-17 NOTE — PROGRESS NOTES
V2.0  [facility] Hospitalist Progress Note      Name:  Cherelle Santiago /Age/Sex: 1950  (67 y.o. male)   MRN & CSN:  [id number] & [id number] Encounter Date/Time: 2022 6:22 PM EST    Location:  90/7086- PCP: Jossue Castaneda Day: 7    Assessment and Plan:   Cherelle Santiago is a 67 y.o. male with past medical history of peripheral artery disease status post PCI of left SFA, CKD stage III, multiple foot wounds, heart failure with preserved ejection fraction, hypertension, diabetes mellitus type 2, who presents with Acute on chronic diastolic heart failure (HCC)    Acute on chronic hypoxic respiratory failure  Acute on chronic diastolic heart failure exacerbation with significant right heart involvement  Initially was on Lasix 40 mg 3 times daily upon admission, de-escalated to twice daily for volume management  Strict intake and output  Measure daily weight with goal dry weight of 260 pounds  Cardiology cs appreciated    Infected postop wound is possible  E faecalis positive blood culture  Right hallux amputation at [facility] on 10/20/[facility]22 prior admission  Pseudomonas in right foot wound - await sensitivity  Blood cultures 1 out of 2 positive - HENRI netagive  Meropenem  - - discussed with infectious disease. Continue meropenem for now  - - meropenem orders placed    NANCY on chronic kidney disease stage III  Unsure if it'ss the new baseline  Baseline creatinine previously 1.6-1.8  Good urine output  Renally dose medications  Avoid nephrotoxic agents  Nephrology consulted, appreciate recommendations    Non-insulin-dependent diabetes mellitus type 2  HbA1c 7.1% in 2022  Jardiance  Low-dose insulin sliding scale AC at bedtime    Peripheral artery disease status post PCI of left SFA  Continue aspirin Plavix and statin    Hypertension  Continue beta-blocker and Imdur    COPD, not in exacerbation  On 2 L home oxygen  Continue COPD regimen    Diet ADULT DIET;  Regular; 1000 ml   DVT Prophylaxis [] Lovenox, [x]  Heparin, [] SCDs, [] Ambulation,  [] Eliquis, [] Xarelto  [] Coumadin   Code Status Full Code   Disposition From: Home  Expected Disposition: Home  Estimated Date of Discharge: 1-2 days  Patient requires continued admission due to we will determine the outpatient antibiotic regimen   Surrogate Decision Maker/ POA Daughter     Subjective:     Chief Complaint: Shortness of breath, right leg pain      November 17:    Cardiology signed off yesterday - appreciate note     ID recommends meropenem until 12/24/2022  [phone] - will communicate with Dr. Renetta Davis re: IV Access    Later had the tunn PICC done in IR in the afternoon    Was on karl at home - reports has not slept in days - asked for something to sleep - karl restarted, and ordered trazodone one dose        November 16:    Pseudomonas appears to be pansensitive  Sitting up at edge of bed when seen, denies complaints          November 15:    Patient was wanting to go home today  No dyspnea at rest reported  Spoke to daughter Ruby Partida at the bedside            Prior notes by colleague    Yoseph Liang is a 67 y.o. male who presents with progressively worsening shortness of breath and right foot pain. Patient was seen and evaluated at bedside earlier this morning. Patient was alert oriented x3. Hemodynamically stable. Reported that his shortness of breath is markedly improved. No significant overnight events noticed. Objective: Intake/Output Summary (Last 24 hours) at 11/17/2022 0949  Last data filed at 11/17/2022 0441  Gross per 24 hour   Intake 100 ml   Output 2700 ml   Net -2600 ml          Vitals:   Vitals:    11/17/22 0534   BP:    Pulse:    Resp: 16   Temp:    SpO2:        Physical Exam:      Physical Exam  Constitutional:       Appearance: He is not ill-appearing. Pulmonary:      Effort: Pulmonary effort is normal.   Neurological:      Cranial Nerves: No cranial nerve deficit.         Medications:   Medications:    torsemide 50 mg Oral BID    amLODIPine  10 mg Oral Daily    meropenem  1,000 mg IntraVENous Q12H    carvedilol  25 mg Oral BID WC    isosorbide mononitrate  60 mg Oral Daily    heparin (porcine)  5,000 Units SubCUTAneous 3 times per day    empagliflozin  10 mg Oral Daily    sodium hypochlorite   Irrigation Daily    aspirin  81 mg Oral Daily    atorvastatin  40 mg Oral Nightly    clopidogrel  75 mg Oral Daily    magnesium oxide  400 mg Oral BID    tiotropium  2 puff Inhalation Daily    sodium chloride flush  5-40 mL IntraVENous 2 times per day    insulin lispro  0-4 Units SubCUTAneous TID WC    insulin lispro  0-4 Units SubCUTAneous Nightly    metOLazone  2.5 mg Oral BID    collagenase   Topical Daily      Infusions:    sodium chloride      dextrose       PRN Meds: hydrOXYzine HCl, 25 mg, TID PRN  hydrALAZINE, 10 mg, Q6H PRN  albuterol sulfate HFA, 2 puff, Q4H PRN  sodium chloride flush, 5-40 mL, PRN  sodium chloride, , PRN  ondansetron, 4 mg, Q8H PRN   Or  ondansetron, 4 mg, Q6H PRN  polyethylene glycol, 17 g, Daily PRN  acetaminophen, 650 mg, Q6H PRN   Or  acetaminophen, 650 mg, Q6H PRN  glucose, 4 tablet, PRN  dextrose bolus, 125 mL, PRN   Or  dextrose bolus, 250 mL, PRN  glucagon (rDNA), 1 mg, PRN  dextrose, , Continuous PRN      Labs      Recent Results (from the past 24 hour(s))   POCT Glucose    Collection Time: 11/16/22 11:55 AM   Result Value Ref Range    POC Glucose 187 (H) 70 - 99 MG/DL   POCT Glucose    Collection Time: 11/16/22  4:08 PM   Result Value Ref Range    POC Glucose 254 (H) 70 - 99 MG/DL   Comprehensive Metabolic Panel    Collection Time: 11/16/22  5:56 PM   Result Value Ref Range    Sodium 137 135 - 145 MMOL/L    Potassium 4.3 3.5 - 5.1 MMOL/L    Chloride 93 (L) 99 - 110 mMol/L    CO2 33 (H) 21 - 32 MMOL/L    BUN 36 (H) 6 - 23 MG/DL    Creatinine 2.2 (H) 0.9 - 1.3 MG/DL    Est, Glom Filt Rate 31 (L) >60 mL/min/1.73m2    Glucose 172 (H) 70 - 99 MG/DL    Calcium 8.4 8.3 - 10.6 MG/DL Albumin 3.4 3.4 - 5.0 GM/DL    Total Protein 7.1 6.4 - 8.2 GM/DL    Total Bilirubin 0.8 0.0 - 1.0 MG/DL    ALT 7 (L) 10 - 40 U/L    AST 11 (L) 15 - 37 IU/L    Alkaline Phosphatase 98 40 - 128 IU/L    Anion Gap 11 4 - 16   C-Reactive Protein    Collection Time: 11/16/22  5:56 PM   Result Value Ref Range    CRP High Sensitivity 23.7 (H) <5.0 mg/L   CBC    Collection Time: 11/16/22  5:56 PM   Result Value Ref Range    WBC 5.1 4.0 - 10.5 K/CU MM    RBC 3.36 (L) 4.6 - 6.2 M/CU MM    Hemoglobin 8.9 (L) 13.5 - 18.0 GM/DL    Hematocrit 29.3 (L) 42 - 52 %    MCV 87.2 78 - 100 FL    MCH 26.5 (L) 27 - 31 PG    MCHC 30.4 (L) 32.0 - 36.0 %    RDW 17.2 (H) 11.7 - 14.9 %    Platelets 842 406 - 354 K/CU MM    MPV 9.7 7.5 - 11.1 FL   POCT Glucose    Collection Time: 11/16/22  8:44 PM   Result Value Ref Range    POC Glucose 314 (H) 70 - 99 MG/DL   C-Reactive Protein    Collection Time: 11/17/22  4:45 AM   Result Value Ref Range    CRP High Sensitivity 20.6 (H) <5.0 mg/L   CBC    Collection Time: 11/17/22  4:45 AM   Result Value Ref Range    WBC 5.0 4.0 - 10.5 K/CU MM    RBC 3.30 (L) 4.6 - 6.2 M/CU MM    Hemoglobin 8.7 (L) 13.5 - 18.0 GM/DL    Hematocrit 28.5 (L) 42 - 52 %    MCV 86.4 78 - 100 FL    MCH 26.4 (L) 27 - 31 PG    MCHC 30.5 (L) 32.0 - 36.0 %    RDW 17.2 (H) 11.7 - 14.9 %    Platelets 557 892 - 906 K/CU MM    MPV 9.5 7.5 - 11.1 FL   Comprehensive Metabolic Panel    Collection Time: 11/17/22  4:45 AM   Result Value Ref Range    Sodium 138 135 - 145 MMOL/L    Potassium 4.4 3.5 - 5.1 MMOL/L    Chloride 94 (L) 99 - 110 mMol/L    CO2 34 (H) 21 - 32 MMOL/L    BUN 38 (H) 6 - 23 MG/DL    Creatinine 2.3 (H) 0.9 - 1.3 MG/DL    Est, Glom Filt Rate 29 (L) >60 mL/min/1.73m2    Glucose 111 (H) 70 - 99 MG/DL    Calcium 8.7 8.3 - 10.6 MG/DL    Albumin 3.4 3.4 - 5.0 GM/DL    Total Protein 7.0 6.4 - 8.2 GM/DL    Total Bilirubin 0.8 0.0 - 1.0 MG/DL    ALT 6 (L) 10 - 40 U/L    AST 11 (L) 15 - 37 IU/L    Alkaline Phosphatase 100 40 - 129 IU/L Anion Gap 10 4 - 16   POCT Glucose    Collection Time: 11/17/22  7:57 AM   Result Value Ref Range    POC Glucose 154 (H) 70 - 99 MG/DL        Imaging/Diagnostics Last 24 Hours   No results found.     Electronically signed by Cassie Galaviz MD on 11/17/2022 at 9:49 AM

## 2022-11-18 VITALS
DIASTOLIC BLOOD PRESSURE: 75 MMHG | RESPIRATION RATE: 15 BRPM | OXYGEN SATURATION: 97 % | TEMPERATURE: 97.6 F | WEIGHT: 261 LBS | BODY MASS INDEX: 35.35 KG/M2 | SYSTOLIC BLOOD PRESSURE: 164 MMHG | HEIGHT: 72 IN | HEART RATE: 61 BPM

## 2022-11-18 LAB
ALBUMIN SERPL-MCNC: 3.5 GM/DL (ref 3.4–5)
ALP BLD-CCNC: 100 IU/L (ref 40–128)
ALT SERPL-CCNC: 6 U/L (ref 10–40)
ANION GAP SERPL CALCULATED.3IONS-SCNC: 8 MMOL/L (ref 4–16)
AST SERPL-CCNC: 12 IU/L (ref 15–37)
BILIRUB SERPL-MCNC: 0.8 MG/DL (ref 0–1)
BUN BLDV-MCNC: 41 MG/DL (ref 6–23)
C-REACTIVE PROTEIN, HIGH SENSITIVITY: 14.7 MG/L
CALCIUM SERPL-MCNC: 8.5 MG/DL (ref 8.3–10.6)
CHLORIDE BLD-SCNC: 94 MMOL/L (ref 99–110)
CO2: 37 MMOL/L (ref 21–32)
CREAT SERPL-MCNC: 2.3 MG/DL (ref 0.9–1.3)
CULTURE: NORMAL
CULTURE: NORMAL
GFR SERPL CREATININE-BSD FRML MDRD: 29 ML/MIN/1.73M2
GLUCOSE BLD-MCNC: 127 MG/DL (ref 70–99)
GLUCOSE BLD-MCNC: 187 MG/DL (ref 70–99)
HCT VFR BLD CALC: 28.3 % (ref 42–52)
HEMOGLOBIN: 8.6 GM/DL (ref 13.5–18)
Lab: NORMAL
Lab: NORMAL
MCH RBC QN AUTO: 26.5 PG (ref 27–31)
MCHC RBC AUTO-ENTMCNC: 30.4 % (ref 32–36)
MCV RBC AUTO: 87.3 FL (ref 78–100)
PDW BLD-RTO: 17.6 % (ref 11.7–14.9)
PLATELET # BLD: 203 K/CU MM (ref 140–440)
PMV BLD AUTO: 9.4 FL (ref 7.5–11.1)
POTASSIUM SERPL-SCNC: 4.3 MMOL/L (ref 3.5–5.1)
RBC # BLD: 3.24 M/CU MM (ref 4.6–6.2)
SODIUM BLD-SCNC: 139 MMOL/L (ref 135–145)
SPECIMEN: NORMAL
SPECIMEN: NORMAL
TOTAL PROTEIN: 7.2 GM/DL (ref 6.4–8.2)
WBC # BLD: 5.7 K/CU MM (ref 4–10.5)

## 2022-11-18 PROCEDURE — 36415 COLL VENOUS BLD VENIPUNCTURE: CPT

## 2022-11-18 PROCEDURE — 6370000000 HC RX 637 (ALT 250 FOR IP): Performed by: STUDENT IN AN ORGANIZED HEALTH CARE EDUCATION/TRAINING PROGRAM

## 2022-11-18 PROCEDURE — 94761 N-INVAS EAR/PLS OXIMETRY MLT: CPT

## 2022-11-18 PROCEDURE — 99232 SBSQ HOSP IP/OBS MODERATE 35: CPT | Performed by: INTERNAL MEDICINE

## 2022-11-18 PROCEDURE — 6370000000 HC RX 637 (ALT 250 FOR IP): Performed by: INTERNAL MEDICINE

## 2022-11-18 PROCEDURE — 2580000003 HC RX 258: Performed by: INTERNAL MEDICINE

## 2022-11-18 PROCEDURE — 80053 COMPREHEN METABOLIC PANEL: CPT

## 2022-11-18 PROCEDURE — 86140 C-REACTIVE PROTEIN: CPT

## 2022-11-18 PROCEDURE — 6360000002 HC RX W HCPCS: Performed by: INTERNAL MEDICINE

## 2022-11-18 PROCEDURE — 6360000002 HC RX W HCPCS: Performed by: STUDENT IN AN ORGANIZED HEALTH CARE EDUCATION/TRAINING PROGRAM

## 2022-11-18 PROCEDURE — 82962 GLUCOSE BLOOD TEST: CPT

## 2022-11-18 PROCEDURE — 85027 COMPLETE CBC AUTOMATED: CPT

## 2022-11-18 PROCEDURE — 94640 AIRWAY INHALATION TREATMENT: CPT

## 2022-11-18 RX ORDER — METOLAZONE 2.5 MG/1
2.5 TABLET ORAL 2 TIMES DAILY
Qty: 30 TABLET | Refills: 0 | Status: SHIPPED | OUTPATIENT
Start: 2022-11-18

## 2022-11-18 RX ORDER — SODIUM HYPOCHLORITE 1.25 MG/ML
SOLUTION TOPICAL DAILY
Qty: 1 EACH | Refills: 0 | Status: SHIPPED | OUTPATIENT
Start: 2022-11-18

## 2022-11-18 RX ORDER — TORSEMIDE 100 MG/1
50 TABLET ORAL DAILY
Qty: 15 TABLET | Refills: 0 | Status: SHIPPED | OUTPATIENT
Start: 2022-11-18 | End: 2022-12-12 | Stop reason: SDUPTHER

## 2022-11-18 RX ORDER — ATORVASTATIN CALCIUM 40 MG/1
40 TABLET, FILM COATED ORAL NIGHTLY
Qty: 30 TABLET | Refills: 3 | Status: SHIPPED | OUTPATIENT
Start: 2022-11-18

## 2022-11-18 RX ORDER — CARVEDILOL 25 MG/1
25 TABLET ORAL 2 TIMES DAILY
Qty: 60 TABLET | Refills: 0 | Status: SHIPPED | OUTPATIENT
Start: 2022-11-18

## 2022-11-18 RX ORDER — CLOPIDOGREL BISULFATE 75 MG/1
75 TABLET ORAL DAILY
Qty: 30 TABLET | Refills: 1 | Status: SHIPPED | OUTPATIENT
Start: 2022-11-18

## 2022-11-18 RX ORDER — AMLODIPINE BESYLATE 10 MG/1
10 TABLET ORAL DAILY
Qty: 30 TABLET | Refills: 1 | Status: SHIPPED | OUTPATIENT
Start: 2022-11-19

## 2022-11-18 RX ADMIN — ISOSORBIDE MONONITRATE 60 MG: 30 TABLET, EXTENDED RELEASE ORAL at 10:58

## 2022-11-18 RX ADMIN — MAGNESIUM OXIDE TAB 400 MG (241.3 MG ELEMENTAL MG) 400 MG: 400 (241.3 MG) TAB at 10:59

## 2022-11-18 RX ADMIN — TORSEMIDE 50 MG: 20 TABLET ORAL at 10:58

## 2022-11-18 RX ADMIN — GABAPENTIN 300 MG: 300 CAPSULE ORAL at 14:29

## 2022-11-18 RX ADMIN — HEPARIN SODIUM 5000 UNITS: 5000 INJECTION INTRAVENOUS; SUBCUTANEOUS at 14:30

## 2022-11-18 RX ADMIN — MEROPENEM 1000 MG: 1 INJECTION, POWDER, FOR SOLUTION INTRAVENOUS at 02:39

## 2022-11-18 RX ADMIN — EMPAGLIFLOZIN 10 MG: 10 TABLET, FILM COATED ORAL at 10:59

## 2022-11-18 RX ADMIN — MEROPENEM 1000 MG: 1 INJECTION, POWDER, FOR SOLUTION INTRAVENOUS at 14:30

## 2022-11-18 RX ADMIN — AMLODIPINE BESYLATE 10 MG: 10 TABLET ORAL at 10:58

## 2022-11-18 RX ADMIN — TIOTROPIUM BROMIDE INHALATION SPRAY 2 PUFF: 3.12 SPRAY, METERED RESPIRATORY (INHALATION) at 07:57

## 2022-11-18 RX ADMIN — METOLAZONE 2.5 MG: 2.5 TABLET ORAL at 10:59

## 2022-11-18 RX ADMIN — ASPIRIN 81 MG CHEWABLE TABLET 81 MG: 81 TABLET CHEWABLE at 10:59

## 2022-11-18 RX ADMIN — GABAPENTIN 300 MG: 300 CAPSULE ORAL at 10:59

## 2022-11-18 RX ADMIN — CARVEDILOL 25 MG: 25 TABLET, FILM COATED ORAL at 12:33

## 2022-11-18 ASSESSMENT — PAIN SCALES - GENERAL
PAINLEVEL_OUTOF10: 3
PAINLEVEL_OUTOF10: 3
PAINLEVEL_OUTOF10: 0
PAINLEVEL_OUTOF10: 3

## 2022-11-18 NOTE — PROGRESS NOTES
Infectious Disease Progress Note  2022   Patient Name: Tete Gloria : 1950     Assessment  Enterococcus faecalis bacteremia  Afebrile  HENRI done, no vegetations  Repeat blood cultures from 2022, 0/2 NGTD  Probable right foot osteomyelitis  Nonhealing wound. Culture positive for Pseudomonas aeruginosa and group B streptococcus  Improving, CRP on a downward trend  Right IJ tunneled PICC line was placed on 2022  Penicillin allergy  Reported as hives; he is uncertain about when this occurred. Penicillin will be avoided. He tolerates cephalosporins and carbapenems  Diastolic CHF  Right-sided heart failure  Pacemaker  Comorbid conditions: Diabetes mellitus with diabetic polyneuropathy, CKD stage III, obesity, history of diabetic foot ulcers     Plan  Therapeutic: Continue meropenem 1 g every 8 hours for 6 weeks (2022)  After discharge the following should be done:  Weekly labs drawn on Monday during the course of treatment  CBC with differential, CMP, ESR, CRP  Cathflo for blocked vascular access as needed  Fax results to Attn: [place] Staff  # [phone]  See in clinic within one week after discharge  Disposition:   Diagnostic: Trend CRP and procalcitonin  F/u:   Other:   Reason for visit: F/u Enterococcus faecalis bacteremia; History:? Interval history noted  Denies n/v/d/f or untoward effects of antimicrobials  Physical Exam:  Vital Signs: BP (!) 164/75   Pulse 61   Temp 97.6 °F (36.4 °C) (Oral)   Resp 15   Ht 6' (1.829 m)   Wt 261 lb (118.4 kg)   SpO2 97%   BMI 35.40 kg/m²     Gen: alert and oriented X3, no distress  Skin: no stigmata of endocarditis  Wounds: Bilateral feet dressing C/D/I  HEMT: AT/NC Oropharynx pink, moist, and without lesions or exudates; dentition in poor state of repair  Eyes: PERRLA, EOMI, conjunctiva pink, sclera anicteric. Neck: Supple. Trachea midline. No LAD. Chest: no distress and CTA. Good air movement.   Heart: RRR and no MRG.   Abd: soft, non-distended, no tenderness, no hepatomegaly. Normoactive bowel sounds. Ext: no clubbing, cyanosis, or edema  Catheter Site: without erythema or tenderness  LDA:   Neuro: Mental status intact. CN 2-12 intact and no focal sensory or motor deficits     Radiologic / Imaging / TESTING  No results found.      Labs:    Recent Results (from the past 24 hour(s))   POCT Glucose    Collection Time: 11/17/22  4:38 PM   Result Value Ref Range    POC Glucose 283 (H) 70 - 99 MG/DL   POCT Glucose    Collection Time: 11/17/22  8:43 PM   Result Value Ref Range    POC Glucose 213 (H) 70 - 99 MG/DL   C-Reactive Protein    Collection Time: 11/18/22  6:11 AM   Result Value Ref Range    CRP High Sensitivity 14.7 (H) <5.0 mg/L   CBC    Collection Time: 11/18/22  6:11 AM   Result Value Ref Range    WBC 5.7 4.0 - 10.5 K/CU MM    RBC 3.24 (L) 4.6 - 6.2 M/CU MM    Hemoglobin 8.6 (L) 13.5 - 18.0 GM/DL    Hematocrit 28.3 (L) 42 - 52 %    MCV 87.3 78 - 100 FL    MCH 26.5 (L) 27 - 31 PG    MCHC 30.4 (L) 32.0 - 36.0 %    RDW 17.6 (H) 11.7 - 14.9 %    Platelets 822 835 - 683 K/CU MM    MPV 9.4 7.5 - 11.1 FL   Comprehensive Metabolic Panel    Collection Time: 11/18/22  6:11 AM   Result Value Ref Range    Sodium 139 135 - 145 MMOL/L    Potassium 4.3 3.5 - 5.1 MMOL/L    Chloride 94 (L) 99 - 110 mMol/L    CO2 37 (H) 21 - 32 MMOL/L    BUN 41 (H) 6 - 23 MG/DL    Creatinine 2.3 (H) 0.9 - 1.3 MG/DL    Est, Glom Filt Rate 29 (L) >60 mL/min/1.73m2    Glucose 127 (H) 70 - 99 MG/DL    Calcium 8.5 8.3 - 10.6 MG/DL    Albumin 3.5 3.4 - 5.0 GM/DL    Total Protein 7.2 6.4 - 8.2 GM/DL    Total Bilirubin 0.8 0.0 - 1.0 MG/DL    ALT 6 (L) 10 - 40 U/L    AST 12 (L) 15 - 37 IU/L    Alkaline Phosphatase 100 40 - 128 IU/L    Anion Gap 8 4 - 16   POCT Glucose    Collection Time: 11/18/22 12:27 PM   Result Value Ref Range    POC Glucose 187 (H) 70 - 99 MG/DL     CULTURE results: Invalid input(s): BLOOD CULTURE,  URINE CULTURE, SURGICAL CULTURE    Diagnosis:  Patient Active Problem List   Diagnosis    PAD (peripheral artery disease) ([facility])    Chronic coronary artery disease    Biventricular ICD (implantable cardioverter-defibrillator) in place    Chronic combined systolic and diastolic heart failure ([facility])    Chronic kidney disease, stage III (moderate) ([facility])    Mixed hyperlipidemia    Sick sinus syndrome ([facility])    Type 2 diabetes mellitus with diabetic polyneuropathy ([address]    Spinal stenosis of lumbar region    Obesity, Class I, BMI 30-34.9    S/P partial colectomy    Tubulovillous adenoma of colon    Microalbuminuria    WD-PVD (peripheral vascular disease) ([facility])    Limb ischemia    Necrotic toes ([facility])    Toe gangrene ([facility])    Diabetic foot infection ([address]    Chronic kidney disease (CKD) stage G3a/A2, moderately decreased glomerular filtration rate (GFR) between 45-59 mL/min/1.73 square meter and albuminuria creatinine ratio between  mg/g ([facility])    Edema    ICD (implantable cardioverter-defibrillator) battery depletion    Hyperkalemia    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[facility])    Ischemia of toe    Acute kidney injury ([address]    Fluid overload    DM (diabetes mellitus) ([facility])    Precordial pain    Acute chest pain    Unstable angina ([facility])    Chronic kidney disease (CKD) stage G3a/A3, moderately decreased glomerular filtration rate (GFR) between 45-59 mL/min/1.73 square meter and albuminuria creatinine ratio greater than 300 mg/g ([facility])    Cardiomyopathy (HCC)    Diabetic neuropathy ([facility])    HTN (hypertension)    Epigastric pain    Acute on chronic congestive heart failure (HCC)    Leg edema    Acute on chronic systolic CHF (congestive heart failure) ([facility])    Diabetic foot ulcer with osteomyelitis ([address]    Visit for wound check    Moderate malnutrition ([address]    Long term (current) use of antibiotics    WD-Diabetic ulcer of toe of right foot associated with type 2 diabetes mellitus, with fat layer exposed ([address]    Diabetic ulcer of toe associated with type 2 diabetes mellitus, with bone involvement without evidence of necrosis ([address]    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    Persistent wound pain    Receiving intravenous antibiotic treatment as outpatient    Acute on chronic respiratory failure with hypoxemia ([address]    WD-Chronic foot ulcer, left, with necrosis of bone (HCC)    Acute on chronic HFrEF (heart failure with reduced ejection fraction) ([facility])    Scrotal edema    Hypertensive urgency    Diabetic ulcer of right foot due to type 2 diabetes mellitus ([address]    Cellulitis of foot    Toe osteomyelitis ([facility])    Heart failure exacerbated by sotalol ([facility])    CHF (congestive heart failure), NYHA class I, acute on chronic, combined (HCC)    Acute on chronic diastolic CHF (congestive heart failure) ([facility])    Leg swelling    Acute on chronic diastolic heart failure (HCC)    Skin ulcer of left foot including toes with fat layer exposed ([address]    Superficial incisional surgical site infection    Bacteremia due to Enterococcus       Active Problems  Principal Problem:    Acute on chronic diastolic heart failure (HCC)  Active Problems:    CHF (congestive heart failure), NYHA class I, acute on chronic, combined (HCC)    Acute on chronic diastolic CHF (congestive heart failure) (HCC)    Leg swelling    Skin ulcer of left foot including toes with fat layer exposed ([address]    Superficial incisional surgical site infection    Bacteremia due to Enterococcus    Biventricular ICD (implantable cardioverter-defibrillator) in place    WD-PVD (peripheral vascular disease) ([address]  Resolved Problems:    * No resolved hospital problems.  *              Electronically signed by: Electronically signed by Loli Powers MD on 11/18/2022 at 12:49 PM

## 2022-11-18 NOTE — PROGRESS NOTES
Comprehensive Nutrition Assessment    Type and Reason for Visit:  Reassess    Nutrition Recommendations/Plan:   Low 2 gm Sodium, No Concentrated Sweets Diet  Continue wound healing supplement as able     Malnutrition Assessment:  Malnutrition Status:  Insufficient data (11/14/22 1616)    Context:  Chronic Illness       Nutrition Assessment:    Pt continues with fair to adequate po intake on Regular Diet with strict 1000 ml fluid restriction. Has been consuming 26% to all of meals. Low salt for home, dry wt 260 lb noted. Fluid loss noted. Pt is receiving care during my room visit. Will continue to follow as moderate nutrition risk. Nutrition Related Findings:    BUN 41, Cr 2.3, GFR 29, Glu 127, A1c 7.1 Wound Type: Multiple, Surgical Incision (non-healing)       Current Nutrition Intake & Therapies:    Average Meal Intake: %, 26-50%  Average Supplements Intake: Unable to assess  ADULT DIET; Regular; 1000 ml    Anthropometric Measures:  Height: 6' (182.9 cm)  Ideal Body Weight (IBW): 178 lbs (81 kg)    Admission Body Weight: 273 lb 5.9 oz (124 kg)  Current Body Weight: 261 lb (118.4 kg), 146.6 % IBW.  Weight Source: Bed Scale  Current BMI (kg/m2): 35.4  Usual Body Weight: 295 lb (133.8 kg) (1/25/22)  % Weight Change (Calculated): -11.5                    BMI Categories: Obese Class 2 (BMI 35.0 -39.9)    Estimated Daily Nutrient Needs:  Energy Requirements Based On: Formula  Weight Used for Energy Requirements: Current  Energy (kcal/day): 2363 (MSJ)  Weight Used for Protein Requirements: Ideal  Protein (g/day):  (1.2-1.3 g/kg IBW)  Method Used for Fluid Requirements:  (1000 ml fluid restriction noted)  Fluid (ml/day): 2300    Nutrition Diagnosis:   Predicted inadequate energy intake related to increase demand for energy/nutrients as evidenced by wounds, weight loss    Nutrition Interventions:   Food and/or Nutrient Delivery: Continue Current Diet, Continue Oral Nutrition Supplement  Nutrition Education/Counseling: No recommendation at this time  Coordination of Nutrition Care: Continue to monitor while inpatient       Goals:  Previous Goal Met: Progressing toward Goal(s)  Goals: Meet at least 75% of estimated needs       Nutrition Monitoring and Evaluation:   Behavioral-Environmental Outcomes: None Identified  Food/Nutrient Intake Outcomes: Food and Nutrient Intake, Supplement Intake  Physical Signs/Symptoms Outcomes: Biochemical Data, Chewing or Swallowing, GI Status, Fluid Status or Edema, Skin, Weight    Discharge Planning:    Continue Oral Nutrition Supplement     Alfred Vidal, [address], [place]  Contact: [phone]

## 2022-11-18 NOTE — CARE COORDINATION
Dr Skylar Sutton state pt will be dc'd today. Dose due at 2p. ..told nurse Lizeth Diaz to give to pt then dc pt. Requested [employer] delivery after 6p. Pt aware of this plan ans is agreeable. Patricia at [facility] is also agreeable to start tomorrow am.    4:05 PM Spoke with pt and he is in agreement with [facility] starting in the am and [employer] delivering his atbs tonight. Had both doses for today have been administered per [facility]

## 2022-11-18 NOTE — PROGRESS NOTES
Revwd  AVS with PT including all medications and FU appts. No further concerns.  Out per [facility]

## 2022-11-18 NOTE — PROGRESS NOTES
Nephrology Progress Note  11/18/2022 7:46 AM        Subjective:   Admit Date: 11/11/2022  PCP: Susie Palacios    Interval History: Had right IJ tunneled PICC  No major event overnight    Diet: Reasonable    ROS: Still good diluted urine  Urine output recorded 3.7 L for the last 24 hours  No fever and acceptable blood pressure      Data:     Current meds:    gabapentin  300 mg Oral TID    torsemide  50 mg Oral BID    amLODIPine  10 mg Oral Daily    meropenem  1,000 mg IntraVENous Q12H    carvedilol  25 mg Oral BID WC    isosorbide mononitrate  60 mg Oral Daily    heparin (porcine)  5,000 Units SubCUTAneous 3 times per day    empagliflozin  10 mg Oral Daily    sodium hypochlorite   Irrigation Daily    aspirin  81 mg Oral Daily    atorvastatin  40 mg Oral Nightly    [Held by provider] clopidogrel  75 mg Oral Daily    magnesium oxide  400 mg Oral BID    tiotropium  2 puff Inhalation Daily    sodium chloride flush  5-40 mL IntraVENous 2 times per day    insulin lispro  0-4 Units SubCUTAneous TID WC    insulin lispro  0-4 Units SubCUTAneous Nightly    metOLazone  2.5 mg Oral BID    collagenase   Topical Daily      sodium chloride      dextrose           I/O last 3 completed shifts:   In: 950 [P.O.:940; I.V.:10]  Out: 5775 [Urine:5775]    CBC:   Recent Labs     11/16/22 1756 11/17/22  0445 11/18/22  0611   WBC 5.1 5.0 5.7   HGB 8.9* 8.7* 8.6*    195 203          Recent Labs     11/16/22 1756 11/17/22  0445 11/18/22  0611    138 139   K 4.3 4.4 4.3   CL 93* 94* 94*   CO2 33* 34* 37*   BUN 36* 38* 41*   CREATININE 2.2* 2.3* 2.3*   GLUCOSE 172* 111* 127*       Lab Results   Component Value Date    CALCIUM 8.5 11/18/2022    PHOS 3.8 11/13/2022       Objective:     Vitals: BP (!) 164/75   Pulse 61   Temp 97.6 °F (36.4 °C) (Oral)   Resp 15   Ht 6' (1.829 m)   Wt 261 lb (118.4 kg)   SpO2 93%   BMI 35.40 kg/m² ,    General appearance: Alert, awake, oriented in no distress  HEENT: At least 2+ conjunctival pallor  Neck: Seems supple  Lungs: Positive rhonchi no gross crackles  Heart: Seems regular rate and rhythm, implanted chest wall cardiac device  Abdomen: Soft, nontender on palpation  Extremities: Chronic leg edema and wound      Problem List :         Impression :     Acute kidney injury with underlying CKD stage G3 B A3-good urine output and stable  Acute decompensated heart failure with underlying diabetes and atherosclerotic cardiovascular disease-no overt pulmonary edema  Chronic leg edema and leg wound  Underlying hypertension  Enterococcus sepsis thought to be skin or soft tissue origin    Recommendation/Plan  :     Okay to discharge with SGLT2 inhibitors p.o. torsemide and metolazone  Kidney adjust blood pressure medication as an outpatient  Low-salt, DASH diet  Unfortunately his leg edema probably never cannot go away  Close outpatient [id number] in 2 to 3 weeks-daily CBC differential, CMP, magnesium and phosphorus prior to appointment-I advised him to call me if he gains more weight, increasing edema or shortness of breath      Barrington Betancourt MD MD

## 2022-11-21 ENCOUNTER — HOSPITAL ENCOUNTER (OUTPATIENT)
Age: 72
Setting detail: SPECIMEN
Discharge: HOME | End: 2022-11-21
Payer: MEDICARE

## 2022-11-21 LAB
ALBUMIN SERPL-MCNC: 2.8 GM/DL (ref 3.4–5)
ALP BLD-CCNC: 114 IU/L (ref 40–128)
ALT SERPL-CCNC: 19 U/L (ref 10–40)
ANION GAP SERPL CALCULATED.3IONS-SCNC: 8 MMOL/L (ref 4–16)
AST SERPL-CCNC: 44 IU/L (ref 15–37)
BASOPHILS ABSOLUTE: 0 K/CU MM
BASOPHILS RELATIVE PERCENT: 0.5 % (ref 0–1)
BILIRUB SERPL-MCNC: 0.5 MG/DL (ref 0–1)
BUN BLDV-MCNC: 57 MG/DL (ref 6–23)
C-REACTIVE PROTEIN, HIGH SENSITIVITY: 6.9 MG/L
CALCIUM SERPL-MCNC: 6.4 MG/DL (ref 8.3–10.6)
CHLORIDE BLD-SCNC: 104 MMOL/L (ref 99–110)
CO2: 29 MMOL/L (ref 21–32)
CREAT SERPL-MCNC: 2.6 MG/DL (ref 0.9–1.3)
CULTURE: NORMAL
CULTURE: NORMAL
DIFFERENTIAL TYPE: ABNORMAL
EOSINOPHILS ABSOLUTE: 0.2 K/CU MM
EOSINOPHILS RELATIVE PERCENT: 2.4 % (ref 0–3)
ERYTHROCYTE SEDIMENTATION RATE: 62 MM/HR (ref 0–20)
GFR SERPL CREATININE-BSD FRML MDRD: 25 ML/MIN/1.73M2
GLUCOSE BLD-MCNC: 273 MG/DL (ref 70–99)
HCT VFR BLD CALC: 25.7 % (ref 42–52)
HEMOGLOBIN: 7.8 GM/DL (ref 13.5–18)
IMMATURE NEUTROPHIL %: 0.3 % (ref 0–0.43)
LYMPHOCYTES ABSOLUTE: 1.7 K/CU MM
LYMPHOCYTES RELATIVE PERCENT: 25.8 % (ref 24–44)
Lab: NORMAL
Lab: NORMAL
MCH RBC QN AUTO: 27.2 PG (ref 27–31)
MCHC RBC AUTO-ENTMCNC: 30.4 % (ref 32–36)
MCV RBC AUTO: 89.5 FL (ref 78–100)
MONOCYTES ABSOLUTE: 0.4 K/CU MM
MONOCYTES RELATIVE PERCENT: 6.7 % (ref 0–4)
NUCLEATED RBC %: 0 %
PDW BLD-RTO: 18.9 % (ref 11.7–14.9)
PLATELET # BLD: 221 K/CU MM (ref 140–440)
PMV BLD AUTO: 9.8 FL (ref 7.5–11.1)
POTASSIUM SERPL-SCNC: 4.6 MMOL/L (ref 3.5–5.1)
RBC # BLD: 2.87 M/CU MM (ref 4.6–6.2)
SEGMENTED NEUTROPHILS ABSOLUTE COUNT: 4.2 K/CU MM
SEGMENTED NEUTROPHILS RELATIVE PERCENT: 64.3 % (ref 36–66)
SODIUM BLD-SCNC: 141 MMOL/L (ref 135–145)
SPECIMEN: NORMAL
SPECIMEN: NORMAL
TOTAL IMMATURE NEUTOROPHIL: 0.02 K/CU MM
TOTAL NUCLEATED RBC: 0 K/CU MM
TOTAL PROTEIN: 5.8 GM/DL (ref 6.4–8.2)
WBC # BLD: 6.6 K/CU MM (ref 4–10.5)

## 2022-11-21 PROCEDURE — 80053 COMPREHEN METABOLIC PANEL: CPT

## 2022-11-21 PROCEDURE — 85025 COMPLETE CBC W/AUTO DIFF WBC: CPT

## 2022-11-21 PROCEDURE — 86140 C-REACTIVE PROTEIN: CPT

## 2022-11-21 PROCEDURE — 85652 RBC SED RATE AUTOMATED: CPT

## 2022-11-21 NOTE — PROGRESS NOTES
Outpatient Pharmacy Progress Note for Meds-to-Beds    Total number of Prescriptions Filled: 8  The following medications were dispensed to the patient during the discharge process:  Amlodipine  Torsemide  Clopidogrel  Metolazone  Atorvastatin  Carvedilol  Jardiance  Santyl ointment    Additional Documentation:  Medication(s) were delivered to the patient's room prior to discharge      Thank you for letting us serve your patients.   [address] [facility]    [address] [address] [facility]    Phone: [phone]    Fax: [phone]

## 2022-11-21 NOTE — DISCHARGE SUMMARY
V2.0  Discharge Summary    Name:  Neris Powell /Age/Sex: 1950 (67 y.o. male)   Admit Date: 2022  Discharge Date: 22    MRN & CSN:  [id number] & [id number] Encounter Date: 22    Attending:  Dr. Kati Herrera Discharging Provider: Ni Taylor MD       Hospital Course:     Brief HPI: Neris Powell is a 67 y.o. male who recently had an amputation of his right first toe on  because of an ischemic toe. He was discharged from the hospital on  and presented to the ED for this admission on  with a complaint of pain and bleeding from the surgical site of the right foot. In the ED he was diagnosed with acute on chronic CHF and was admitted. Brief Problem Based Course: While here he was seen by infectious disease. Culture of the right first toe amputation wound is growing Pseudomonas aeruginosa. Infectious disease recommended treating him with meropenem until . He had a tunneled PICC line placed in IR while here for the purpose of giving IV antibiotics. Additionally, his CHF was treated and has resolved, and he is now ready for discharge. This admission lasted 7 days.     Problem List    Acute on chronic diastolic heart failure exacerbation with significant right heart involvement  -He also had associated acute respiratory failure with hypoxia  -At the time of discharge he is no longer requiring oxygen  -Appreciate cardiology consult as well as nephrology consult for significant renal disease    Diabetic foot infection with right great toe ischemia status post right great toe amputation on 10/24, now with a possible infected postop wound  -He had Enterococcus faecalis 1 out of 2 blood cultures positive  -Wound culture is growing Pseudomonas  -Plan is for meropenem for 6 weeks     NANCY on chronic kidney disease stage III  -Baseline creatinine previously 1.6-1.8  -Creatinine is 2.3 upon discharge-needs follow-up with nephrology Non-insulin-dependent diabetes mellitus type 2  -HbA1c 7.1% in October 2022     Peripheral artery disease status post PCI of left SFA 10/18/2022 during last admission  -Continue aspirin Plavix and statin     Hypertension  COPD, not in exacerbation    Addendum-medication changes:  -Nephrology started the patient on empagliflozin and amlodipine. He was taken off of hydralazine. Upon discharge he was given scripts for torsemide and metolazone. The diuretics were recommended by nephrology. The patient expressed appropriate understanding of, and agreement with the discharge recommendations, medications, and plan. Consults this admission:  IP CONSULT TO NEPHROLOGY  IP CONSULT TO CARDIOLOGY  IP CONSULT TO GENERAL SURGERY  IP CONSULT TO INFECTIOUS DISEASES  IP CONSULT TO CARDIOLOGY  IP CONSULT TO INTERVENTIONAL RADIOLOGY  IP CONSULT TO HOME CARE NEEDS    Discharge Diagnosis:   Acute on chronic diastolic heart failure [facility])    Discharge Instruction:   Follow up appointments: Infectious disease and nephrology  Primary care physician: Simba Go within 2 weeks  Diet: diabetic diet   Activity: activity as tolerated  Disposition: Discharged to:   []Home, [x]C, []SNF, []Acute Rehab, []Hospice   Condition on discharge: Stable    Labs and Tests to be Followed up as an outpatient by PCP or Specialist: Needs weekly labs while on IV antibiotics. Discharge Medications:        Medication List        START taking these medications      amLODIPine 10 MG tablet  Commonly known as: NORVASC  Take 1 tablet by mouth daily     collagenase 250 UNIT/GM ointment  Apply topically daily. empagliflozin 10 MG tablet  Commonly known as: JARDIANCE  Take 1 tablet by mouth daily     meropenem  infusion  Commonly known as: MERREM  Infuse 1,000 mg intravenously in the morning and 1,000 mg at noon and 1,000 mg in the evening. Compound per protocol. .     metOLazone 2.5 MG tablet  Commonly known as: ZAROXOLYN  Take 1 tablet by mouth in the morning and at bedtime     sodium hypochlorite 0.125 % Soln external solution  Commonly known as: DAKINS  Apply topically daily            CHANGE how you take these medications      carvedilol 25 MG tablet  Commonly known as: COREG  Take 1 tablet by mouth 2 times daily  What changed:   medication strength  how much to take     torsemide 100 MG tablet  Commonly known as: DEMADEX  Take 0.5 tablets by mouth daily  What changed:   medication strength  how much to take            CONTINUE taking these medications      albuterol sulfate  (90 Base) MCG/ACT inhaler  Commonly known as: Ventolin HFA  Inhale 2 puffs into the lungs every 4 hours as needed for Wheezing     aspirin 81 MG chewable tablet  Take 1 tablet by mouth daily     atorvastatin 40 MG tablet  Commonly known as: LIPITOR  Take 1 tablet by mouth nightly     clopidogrel 75 MG tablet  Commonly known as: PLAVIX  Take 1 tablet by mouth daily     isosorbide mononitrate 30 MG extended release tablet  Commonly known as: IMDUR  Take 1 tablet by mouth daily     magnesium oxide 400 (240 Mg) MG tablet  Commonly known as: MAG-OX  Take 1 tablet by mouth 2 times daily     oxyCODONE-acetaminophen 5-325 MG per tablet  Commonly known as: PERCOCET     Spiriva HandiHaler 18 MCG inhalation capsule  Generic drug: tiotropium            STOP taking these medications      hydrALAZINE 25 MG tablet  Commonly known as: APRESOLINE               Where to Get Your Medications        These medications were sent to [address] [place] [address], [address] [facility] [address] [id number]      Phone: [phone]   amLODIPine 10 MG tablet  atorvastatin 40 MG tablet  carvedilol 25 MG tablet  clopidogrel 75 MG tablet  collagenase 250 UNIT/GM ointment  empagliflozin 10 MG tablet  metOLazone 2.5 MG tablet  sodium hypochlorite 0.125 % Soln external solution  torsemide 100 MG tablet       You can get these medications from any pharmacy    Bring a paper prescription for each of these medications  meropenem  infusion        Objective Findings at Discharge:   BP (!) 164/75   Pulse 61   Temp 97.6 °F (36.4 °C) (Oral)   Resp 15   Ht 6' (1.829 m)   Wt 261 lb (118.4 kg)   SpO2 97%   BMI 35.40 kg/m²       Physical Exam:   General: NAD  Eyes: EOMI  Psych: Mood appropriate. Labs and Imaging   IR TUNNELED CVC PLACE WO SQ PORT/PUMP > 5 YEARS    Result Date: 11/17/2022  PROCEDURE: ULTRASOUND GUIDED VASCULAR ACCESS. FLUOROSCOPY GUIDED PLACEMENT OF A SUBCUTANEOUS PORT-A-CATH. 11/17/2022. HISTORY: ORDERING SYSTEM PROVIDED HISTORY: tunn PICC TECHNOLOGIST PROVIDED HISTORY: Reason for exam:->tunn PICC How many lumens are being requested?->2 What side should this line be placed?->Right What site is the preferred site? ->Internal Jugular SEDATION: None FLUOROSCOPY DOSE AND TYPE OR TIME AND EXPOSURES: 0.2 minutes fluoroscopy time AK: 3 mGy TECHNIQUE: Maximum sterile barrier technique including hand hygiene, skin prep and sterile ultrasound technique utilized for procedure. Sterile ultrasound technique also utilized for procedure Ultrasound guidance required for procedure to confirm target vessel patency, puncture site selection, real-time intra procedural guidance. Images made for patient's medical file. Informed consent was obtained after a detailed explanation of the procedure including risks, benefits, and alternatives. All aspects of maximum sterile barrier technique were used including washing hands with conventional soap and water or with alcohol-based hand rubs (ABHR), skin preparation, cap, mask, sterile gown, sterile gloves, and sterile full body drape. Local anesthesia was achieved with lidocaine. A micropuncture needle was used to access the right internal jugular vein using ultrasound guidance.   An ultrasound image demonstrating patency of the vein with needle tip located within it was obtained and stored in PACS. A 0.035 guidewire was used to place a peel-away sheath. Subcutaneous tunnel was created into the right clavicular region through which 6 Kinyarwanda power PICC was placed. Catheter was trimmed to length and advanced to the peel-away sheath. Peel-away sheath removed. Catheter ports aspirated, flushed, capped and affixed to the patient's skin. Skin also closed with 4 0 Vicryl and Dermabond. Dressing applied. Patient tolerated procedure well. FINDINGS: Pre and intraprocedural sonographic images demonstrate widely patent right internal jugular vein with the access needle seen within it. Postprocedure fluoroscopic image demonstrates the tip of the port at the cavo-atrial junction . Successful ultrasound and fluoroscopy guided Port-A-Cath/6 Kinyarwanda power PICC placement with tip at superior cavoatrial junction. CBC: No results for input(s): WBC, HGB, PLT in the last 72 hours. BMP:  No results for input(s): NA, K, CL, CO2, BUN, CREATININE, GLUCOSE in the last 72 hours. Hepatic: No results for input(s): AST, ALT, ALB, BILITOT, ALKPHOS in the last 72 hours. Lipids:   Lab Results   Component Value Date/Time    CHOL 79 12/11/2020 07:00 AM    HDL 30 12/11/2020 07:00 AM    TRIG 61 12/11/2020 07:00 AM     Hemoglobin A1C:   Lab Results   Component Value Date/Time    LABA1C 7.1 10/12/2022 06:28 AM     TSH: No results found for: TSH  Troponin:   Lab Results   Component Value Date/Time    TROPONINT 0.063 11/11/2022 12:50 AM    TROPONINT 0.074 10/12/2022 01:21 PM    TROPONINT 0.056 10/11/2022 11:33 PM     Lactic Acid: No results for input(s): LACTA in the last 72 hours. BNP: No results for input(s): PROBNP in the last 72 hours.   UA:  Lab Results   Component Value Date/Time    NITRU NEGATIVE 11/11/2022 02:45 PM    COLORU YELLOW 11/11/2022 02:45 PM    PHUR 5.0 08/19/2016 09:38 AM    WBCUA <1 11/11/2022 02:45 PM    RBCUA NONE SEEN 11/11/2022 02:45 PM    MUCUS RARE 11/11/2022 02:45 PM    TRICHOMONAS NONE SEEN 11/11/2022 02:45 PM    BACTERIA NEGATIVE 11/11/2022 02:45 PM    CLARITYU CLEAR 11/11/2022 02:45 PM    SPECGRAV 1.020 11/11/2022 02:45 PM    LEUKOCYTESUR NEGATIVE 11/11/2022 02:45 PM    UROBILINOGEN 0.2 11/11/2022 02:45 PM    BILIRUBINUR NEGATIVE 11/11/2022 02:45 PM    BLOODU NEGATIVE 11/11/2022 02:45 PM    KETUA NEGATIVE 11/11/2022 02:45 PM     Time Spent Discharging patient 35 minutes    Electronically signed by Tasia Schneider MD on 11/21/2022 at 2:17 PM

## 2022-11-28 ENCOUNTER — HOSPITAL ENCOUNTER (OUTPATIENT)
Age: 72
Setting detail: SPECIMEN
Discharge: HOME | End: 2022-11-28
Payer: MEDICARE

## 2022-11-28 LAB
ALBUMIN SERPL-MCNC: 3.3 GM/DL (ref 3.4–5)
ALP BLD-CCNC: 139 IU/L (ref 40–128)
ALT SERPL-CCNC: 26 U/L (ref 10–40)
ANION GAP SERPL CALCULATED.3IONS-SCNC: 9 MMOL/L (ref 4–16)
AST SERPL-CCNC: 33 IU/L (ref 15–37)
BASOPHILS ABSOLUTE: 0 K/CU MM
BASOPHILS RELATIVE PERCENT: 0.7 % (ref 0–1)
BILIRUB SERPL-MCNC: 0.5 MG/DL (ref 0–1)
BUN BLDV-MCNC: 35 MG/DL (ref 6–23)
C-REACTIVE PROTEIN, HIGH SENSITIVITY: 12.2 MG/L
CALCIUM SERPL-MCNC: 8.5 MG/DL (ref 8.3–10.6)
CHLORIDE BLD-SCNC: 103 MMOL/L (ref 99–110)
CO2: 25 MMOL/L (ref 21–32)
CREAT SERPL-MCNC: 1.8 MG/DL (ref 0.9–1.3)
DIFFERENTIAL TYPE: ABNORMAL
EOSINOPHILS ABSOLUTE: 0.2 K/CU MM
EOSINOPHILS RELATIVE PERCENT: 2.6 % (ref 0–3)
ERYTHROCYTE SEDIMENTATION RATE: 77 MM/HR (ref 0–20)
GFR SERPL CREATININE-BSD FRML MDRD: 39 ML/MIN/1.73M2
GLUCOSE BLD-MCNC: 306 MG/DL (ref 70–99)
HCT VFR BLD CALC: 29.5 % (ref 42–52)
HEMOGLOBIN: 8.8 GM/DL (ref 13.5–18)
IMMATURE NEUTROPHIL %: 0.2 % (ref 0–0.43)
LYMPHOCYTES ABSOLUTE: 1.3 K/CU MM
LYMPHOCYTES RELATIVE PERCENT: 20.9 % (ref 24–44)
MAGNESIUM: 1.9 MG/DL (ref 1.8–2.4)
MCH RBC QN AUTO: 27.3 PG (ref 27–31)
MCHC RBC AUTO-ENTMCNC: 29.8 % (ref 32–36)
MCV RBC AUTO: 91.6 FL (ref 78–100)
MONOCYTES ABSOLUTE: 0.4 K/CU MM
MONOCYTES RELATIVE PERCENT: 6.4 % (ref 0–4)
NUCLEATED RBC %: 0 %
PDW BLD-RTO: 18.3 % (ref 11.7–14.9)
PHOSPHORUS: 3.7 MG/DL (ref 2.5–4.9)
PLATELET # BLD: 226 K/CU MM (ref 140–440)
PMV BLD AUTO: 10 FL (ref 7.5–11.1)
POTASSIUM SERPL-SCNC: 5.2 MMOL/L (ref 3.5–5.1)
RBC # BLD: 3.22 M/CU MM (ref 4.6–6.2)
SEGMENTED NEUTROPHILS ABSOLUTE COUNT: 4.2 K/CU MM
SEGMENTED NEUTROPHILS RELATIVE PERCENT: 69.2 % (ref 36–66)
SODIUM BLD-SCNC: 137 MMOL/L (ref 135–145)
TOTAL IMMATURE NEUTOROPHIL: 0.01 K/CU MM
TOTAL NUCLEATED RBC: 0 K/CU MM
TOTAL PROTEIN: 6.8 GM/DL (ref 6.4–8.2)
WBC # BLD: 6.1 K/CU MM (ref 4–10.5)

## 2022-11-28 PROCEDURE — 86140 C-REACTIVE PROTEIN: CPT

## 2022-11-28 PROCEDURE — 84100 ASSAY OF PHOSPHORUS: CPT

## 2022-11-28 PROCEDURE — 83735 ASSAY OF MAGNESIUM: CPT

## 2022-11-28 PROCEDURE — 85025 COMPLETE CBC W/AUTO DIFF WBC: CPT

## 2022-11-28 PROCEDURE — 80053 COMPREHEN METABOLIC PANEL: CPT

## 2022-11-28 PROCEDURE — 85652 RBC SED RATE AUTOMATED: CPT

## 2022-12-02 NOTE — PROGRESS NOTES
Physician Progress Note      PATIENT:               Rickard Claude  [facility] #:                  [id number]  :                       1950  ADMIT DATE:       2022 12:12 AM  Teja Bustos DATE:        2022 6:00 PM  RESPONDING  PROVIDER #:        Yony Costa MD          QUERY TEXT:    Patient admitted with SOB and found to have CHF exacerbation and noted to have   tropeniemia. If possible, please document in the progress notes and   discharge summary if you are evaluating and/or treating any of the following: The medical record reflects the following:  Risk Factors: CHF exacerbation, Acute respiratory failure, NANCY  Clinical Indicators: \"As per  H&P report states \"NSTEMI, type II- Denied   any CP. - Initial Tn mildly elevated. ECG without acute ischemic changes. -   Follow-up repeat Tn. \" and  H&P report states \"Tropeniemia likely demand   ischemia. \",  - Noted in CARDS consult   \"ASCVD: Chronically elevated troponin level\". Treatment: CARDS consult, ECHO, Trending trops    Thank you, Martita Davenport RN, CDS ([phone])  Options provided:  -- NSTEMI Type 2  -- Demand Ischemia only, no MI  -- Demand ischemia and NSTEMI ruled out  -- Other - I will add my own diagnosis  -- Disagree - Not applicable / Not valid  -- Disagree - Clinically unable to determine / Unknown  -- Refer to Clinical Documentation Reviewer    PROVIDER RESPONSE TEXT:    As per  H&P report states \"NSTEMI, type II- Denied any CP. - Initial Tn   mildly elevated. ECG without acute ischemic changes\". NTSEMI type II is   possible. Troponin elevation due to kidney issue is also possible.     Query created by: Michoacano Rankin on 2022 3:28 PM      Electronically signed by:  Yony Costa MD 2022 3:54 PM

## 2022-12-05 ENCOUNTER — HOSPITAL ENCOUNTER (OUTPATIENT)
Age: 72
Setting detail: SPECIMEN
Discharge: HOME | End: 2022-12-05
Payer: MEDICARE

## 2022-12-05 LAB
ALBUMIN SERPL-MCNC: 3.2 GM/DL (ref 3.4–5)
ALP BLD-CCNC: 158 IU/L (ref 40–128)
ALT SERPL-CCNC: 15 U/L (ref 10–40)
ANION GAP SERPL CALCULATED.3IONS-SCNC: 11 MMOL/L (ref 4–16)
AST SERPL-CCNC: 13 IU/L (ref 15–37)
BASOPHILS ABSOLUTE: 0 K/CU MM
BASOPHILS RELATIVE PERCENT: 0.4 % (ref 0–1)
BILIRUB SERPL-MCNC: 0.6 MG/DL (ref 0–1)
BUN BLDV-MCNC: 37 MG/DL (ref 6–23)
C-REACTIVE PROTEIN, HIGH SENSITIVITY: 37.4 MG/L
CALCIUM SERPL-MCNC: 8.5 MG/DL (ref 8.3–10.6)
CHLORIDE BLD-SCNC: 101 MMOL/L (ref 99–110)
CO2: 22 MMOL/L (ref 21–32)
CREAT SERPL-MCNC: 1.6 MG/DL (ref 0.9–1.3)
DIFFERENTIAL TYPE: ABNORMAL
EOSINOPHILS ABSOLUTE: 0.2 K/CU MM
EOSINOPHILS RELATIVE PERCENT: 2.3 % (ref 0–3)
ERYTHROCYTE SEDIMENTATION RATE: 48 MM/HR (ref 0–20)
GFR SERPL CREATININE-BSD FRML MDRD: 45 ML/MIN/1.73M2
GLUCOSE BLD-MCNC: 298 MG/DL (ref 70–99)
HCT VFR BLD CALC: 28.8 % (ref 42–52)
HEMOGLOBIN: 8.7 GM/DL (ref 13.5–18)
IMMATURE NEUTROPHIL %: 0.1 % (ref 0–0.43)
LYMPHOCYTES ABSOLUTE: 1.2 K/CU MM
LYMPHOCYTES RELATIVE PERCENT: 16.5 % (ref 24–44)
MCH RBC QN AUTO: 27.5 PG (ref 27–31)
MCHC RBC AUTO-ENTMCNC: 30.2 % (ref 32–36)
MCV RBC AUTO: 91.1 FL (ref 78–100)
MONOCYTES ABSOLUTE: 0.4 K/CU MM
MONOCYTES RELATIVE PERCENT: 5.9 % (ref 0–4)
NUCLEATED RBC %: 0 %
PDW BLD-RTO: 18 % (ref 11.7–14.9)
PLATELET # BLD: 206 K/CU MM (ref 140–440)
PMV BLD AUTO: 10.8 FL (ref 7.5–11.1)
POTASSIUM SERPL-SCNC: 5.5 MMOL/L (ref 3.5–5.1)
RBC # BLD: 3.16 M/CU MM (ref 4.6–6.2)
SEGMENTED NEUTROPHILS ABSOLUTE COUNT: 5.5 K/CU MM
SEGMENTED NEUTROPHILS RELATIVE PERCENT: 74.8 % (ref 36–66)
SODIUM BLD-SCNC: 134 MMOL/L (ref 135–145)
TOTAL IMMATURE NEUTOROPHIL: 0.01 K/CU MM
TOTAL NUCLEATED RBC: 0 K/CU MM
TOTAL PROTEIN: 6.9 GM/DL (ref 6.4–8.2)
WBC # BLD: 7.4 K/CU MM (ref 4–10.5)

## 2022-12-05 PROCEDURE — 85025 COMPLETE CBC W/AUTO DIFF WBC: CPT

## 2022-12-05 PROCEDURE — 86140 C-REACTIVE PROTEIN: CPT

## 2022-12-05 PROCEDURE — 80053 COMPREHEN METABOLIC PANEL: CPT

## 2022-12-05 PROCEDURE — 85652 RBC SED RATE AUTOMATED: CPT

## 2022-12-12 ENCOUNTER — HOSPITAL ENCOUNTER (OUTPATIENT)
Age: 72
Setting detail: SPECIMEN
Discharge: HOME | End: 2022-12-12
Payer: MEDICARE

## 2022-12-12 LAB
ALBUMIN SERPL-MCNC: 3.2 GM/DL (ref 3.4–5)
ALP BLD-CCNC: 150 IU/L (ref 40–128)
ALT SERPL-CCNC: 11 U/L (ref 10–40)
ANION GAP SERPL CALCULATED.3IONS-SCNC: 12 MMOL/L (ref 4–16)
AST SERPL-CCNC: 15 IU/L (ref 15–37)
BASOPHILS ABSOLUTE: 0 K/CU MM
BASOPHILS RELATIVE PERCENT: 0.5 % (ref 0–1)
BILIRUB SERPL-MCNC: 0.9 MG/DL (ref 0–1)
BUN BLDV-MCNC: 57 MG/DL (ref 6–23)
C-REACTIVE PROTEIN, HIGH SENSITIVITY: 41.5 MG/L
CALCIUM SERPL-MCNC: 8 MG/DL (ref 8.3–10.6)
CHLORIDE BLD-SCNC: 103 MMOL/L (ref 99–110)
CO2: 20 MMOL/L (ref 21–32)
CREAT SERPL-MCNC: 3.2 MG/DL (ref 0.9–1.3)
DIFFERENTIAL TYPE: ABNORMAL
EOSINOPHILS ABSOLUTE: 0.1 K/CU MM
EOSINOPHILS RELATIVE PERCENT: 1.7 % (ref 0–3)
ERYTHROCYTE SEDIMENTATION RATE: 73 MM/HR (ref 0–20)
GFR SERPL CREATININE-BSD FRML MDRD: 20 ML/MIN/1.73M2
GLUCOSE BLD-MCNC: 229 MG/DL (ref 70–99)
HCT VFR BLD CALC: 29 % (ref 42–52)
HEMOGLOBIN: 8.6 GM/DL (ref 13.5–18)
IMMATURE NEUTROPHIL %: 0.3 % (ref 0–0.43)
LYMPHOCYTES ABSOLUTE: 1.5 K/CU MM
LYMPHOCYTES RELATIVE PERCENT: 22.7 % (ref 24–44)
MCH RBC QN AUTO: 27.4 PG (ref 27–31)
MCHC RBC AUTO-ENTMCNC: 29.7 % (ref 32–36)
MCV RBC AUTO: 92.4 FL (ref 78–100)
MONOCYTES ABSOLUTE: 0.5 K/CU MM
MONOCYTES RELATIVE PERCENT: 7.2 % (ref 0–4)
NUCLEATED RBC %: 0.6 %
PDW BLD-RTO: 18.8 % (ref 11.7–14.9)
PLATELET # BLD: 211 K/CU MM (ref 140–440)
PMV BLD AUTO: 9.8 FL (ref 7.5–11.1)
POTASSIUM SERPL-SCNC: 6.1 MMOL/L (ref 3.5–5.1)
RBC # BLD: 3.14 M/CU MM (ref 4.6–6.2)
SEGMENTED NEUTROPHILS ABSOLUTE COUNT: 4.4 K/CU MM
SEGMENTED NEUTROPHILS RELATIVE PERCENT: 67.6 % (ref 36–66)
SODIUM BLD-SCNC: 135 MMOL/L (ref 135–145)
TOTAL IMMATURE NEUTOROPHIL: 0.02 K/CU MM
TOTAL NUCLEATED RBC: 0 K/CU MM
TOTAL PROTEIN: 6.8 GM/DL (ref 6.4–8.2)
WBC # BLD: 6.6 K/CU MM (ref 4–10.5)

## 2022-12-12 PROCEDURE — 85025 COMPLETE CBC W/AUTO DIFF WBC: CPT

## 2022-12-12 PROCEDURE — 86140 C-REACTIVE PROTEIN: CPT

## 2022-12-12 PROCEDURE — 80053 COMPREHEN METABOLIC PANEL: CPT

## 2022-12-12 PROCEDURE — 85652 RBC SED RATE AUTOMATED: CPT

## 2022-12-19 ENCOUNTER — HOSPITAL ENCOUNTER (OUTPATIENT)
Age: 72
Setting detail: SPECIMEN
Discharge: HOME | End: 2022-12-19
Payer: MEDICARE

## 2022-12-19 LAB
ALBUMIN SERPL-MCNC: 3.2 GM/DL (ref 3.4–5)
ALP BLD-CCNC: 150 IU/L (ref 40–128)
ALT SERPL-CCNC: 7 U/L (ref 10–40)
ANION GAP SERPL CALCULATED.3IONS-SCNC: 10 MMOL/L (ref 4–16)
AST SERPL-CCNC: 12 IU/L (ref 15–37)
BASOPHILS ABSOLUTE: 0 K/CU MM
BASOPHILS RELATIVE PERCENT: 0.4 % (ref 0–1)
BILIRUB SERPL-MCNC: 0.8 MG/DL (ref 0–1)
BUN BLDV-MCNC: 59 MG/DL (ref 6–23)
C-REACTIVE PROTEIN, HIGH SENSITIVITY: 20.4 MG/L
CALCIUM SERPL-MCNC: 8.1 MG/DL (ref 8.3–10.6)
CHLORIDE BLD-SCNC: 102 MMOL/L (ref 99–110)
CO2: 29 MMOL/L (ref 21–32)
CREAT SERPL-MCNC: 2.2 MG/DL (ref 0.9–1.3)
DIFFERENTIAL TYPE: ABNORMAL
EOSINOPHILS ABSOLUTE: 0.2 K/CU MM
EOSINOPHILS RELATIVE PERCENT: 2.5 % (ref 0–3)
ERYTHROCYTE SEDIMENTATION RATE: 85 MM/HR (ref 0–20)
GFR SERPL CREATININE-BSD FRML MDRD: 31 ML/MIN/1.73M2
GLUCOSE BLD-MCNC: 206 MG/DL (ref 70–99)
HCT VFR BLD CALC: 28.5 % (ref 42–52)
HEMOGLOBIN: 8.4 GM/DL (ref 13.5–18)
IMMATURE NEUTROPHIL %: 0.1 % (ref 0–0.43)
LYMPHOCYTES ABSOLUTE: 1.3 K/CU MM
LYMPHOCYTES RELATIVE PERCENT: 18.8 % (ref 24–44)
MCH RBC QN AUTO: 27.5 PG (ref 27–31)
MCHC RBC AUTO-ENTMCNC: 29.5 % (ref 32–36)
MCV RBC AUTO: 93.4 FL (ref 78–100)
MONOCYTES ABSOLUTE: 0.5 K/CU MM
MONOCYTES RELATIVE PERCENT: 7.3 % (ref 0–4)
NUCLEATED RBC %: 0 %
PDW BLD-RTO: 20 % (ref 11.7–14.9)
PLATELET # BLD: 216 K/CU MM (ref 140–440)
PMV BLD AUTO: 10 FL (ref 7.5–11.1)
POTASSIUM SERPL-SCNC: 5.1 MMOL/L (ref 3.5–5.1)
RBC # BLD: 3.05 M/CU MM (ref 4.6–6.2)
SEGMENTED NEUTROPHILS ABSOLUTE COUNT: 4.8 K/CU MM
SEGMENTED NEUTROPHILS RELATIVE PERCENT: 70.9 % (ref 36–66)
SODIUM BLD-SCNC: 141 MMOL/L (ref 135–145)
TOTAL IMMATURE NEUTOROPHIL: 0.01 K/CU MM
TOTAL NUCLEATED RBC: 0 K/CU MM
TOTAL PROTEIN: 6.8 GM/DL (ref 6.4–8.2)
WBC # BLD: 6.7 K/CU MM (ref 4–10.5)

## 2022-12-19 PROCEDURE — 86140 C-REACTIVE PROTEIN: CPT

## 2022-12-19 PROCEDURE — 85652 RBC SED RATE AUTOMATED: CPT

## 2022-12-19 PROCEDURE — 85025 COMPLETE CBC W/AUTO DIFF WBC: CPT

## 2022-12-19 PROCEDURE — 80053 COMPREHEN METABOLIC PANEL: CPT

## 2022-12-22 ENCOUNTER — TELEPHONE (OUTPATIENT)
Dept: INFECTIOUS DISEASES | Age: 72
End: 2022-12-22

## 2022-12-22 NOTE — TELEPHONE ENCOUNTER
Called from [place] states pt is finished with ABX on 12/24/22. Can the PICC line be pulled? Advised to maintain the line until she hears back. Please advise.

## 2022-12-31 NOTE — PROGRESS NOTES
Patient placed on bipap on the following settings: IPAP 12, EPAP 6 and 02 at 4lpm entrained to maintain Sp02 90-95%. Patient on continuous monitor and RT has tried to make bipap mask and patient comfortable, but he is unsure if he can tolerate bipap mask. RT explained benefits of bipap and offered patient encouragement. Patient voiced understanding and voices he will try to tolerate bipap mask tonight with sleep. Call light within reach and RN Selene Marquez aware. RT will follow. fever

## 2023-01-01 ENCOUNTER — HOSPITAL ENCOUNTER (OUTPATIENT)
Dept: WOUND CARE | Age: 73
Discharge: HOME | End: 2023-03-31

## 2023-01-01 ENCOUNTER — TELEPHONE (OUTPATIENT)
Dept: CARDIOLOGY CLINIC | Age: 73
End: 2023-01-01

## 2023-01-03 DIAGNOSIS — Z79.2 RECEIVING INTRAVENOUS ANTIBIOTIC TREATMENT AS OUTPATIENT: Primary | ICD-10-CM

## 2023-01-05 NOTE — PROGRESS NOTES
IR Procedure at [facility]:  Unable to reach patient or his contact, voicemail has not been setup on either number. Will try again later. ADDENDUM: Spoke with patient's daughter Kaz To and patient will arrive at 0830 on 1/11/2023 for his procedure at 0830. Also went over below instructions. Patient's daughter will call office about patient stopping his plavix and she will call me back and let me know when he was told to stop it. ADDENDUM: Spoke with IR and was told by Maged Canales that patient does not to stop plavix. Called patient's daughter Kaz To and gave her information, patient will arrive at 0830 at [facility] for his procedure at 56. NPO at [address] [place] [address]      2. Follow your directions as prescribed by the doctor for your procedure and medications. 3.   Consult your provider as to when to stop blood thinner  4. Do not take any pain medication within 6 hours of your procedure  5. Do not drink any alcoholic beverages or use any street drugs 24 hours before procedure. 6.   Please wear simple, loose fitting clothing to the hospital.  Do not bring valuables (money,             credit cards, checkbooks, etc.)     7. If you  have a Living Will and Durable Power of  for Healthcare, please bring in a copy. 8.   Please bring picture ID,  insurance card, paperwork from the doctors office            (H & P, Consent,  & card for implantable devices). 9.   Report to the information desk on the ground floor. 10. Take a shower the night before or morning of your procedure, do not apply any lotion, oil or powder. 11. If you are going to be sedated for the procedure, you will need a responsible adult to drive you home.

## 2023-01-09 NOTE — PROGRESS NOTES
Physical Therapy  Facility/Department: [facility]  Physical Therapy Initial Assessment    Name: Ander Pierson  : 1950  MRN: [id number]  Date of Service: 2022    Discharge Recommendations:  Home with assist PRN   PT Equipment Recommendations  Equipment Needed: No      Patient Diagnosis(es): The primary encounter diagnosis was Diabetic ulcer of toe associated with type 2 diabetes mellitus, with bone involvement without evidence of necrosis, unspecified laterality ([address] Diagnoses of Toe osteomyelitis ([address] Cellulitis of foot, and Infected abrasion of toe of left foot, initial encounter were also pertinent to this visit. Past Medical History:  has a past medical history of Acid reflux, Acute MI ([address] Arthritis, Broken teeth, CAD (coronary artery disease), Cardiomyopathy ([address] CHF (congestive heart failure) ([address] Chronic back pain, Chronic kidney disease, Diabetes mellitus ([address] Diabetic neuropathy ([address] H/O cardiovascular stress test, H/O Doppler ultrasound, H/O percutaneous left heart catheterization, History of irregular heartbeat, History of syncope, Hyperlipidemia, Hypertension, Leg swelling, Necrotic toes (HCC), Neuropathy, neuropathy, PAD (peripheral artery disease) ([address] PVD (peripheral vascular disease) ([address] Sick sinus syndrome ([address] Sleep apnea, Spinal stenosis, Teeth missing, Type 2 diabetes mellitus without complication ([address] and WD-Chronic foot ulcer, left, with necrosis of bone ([address] Past Surgical History:  has a past surgical history that includes Coronary angioplasty with stent; Dental surgery; Colonoscopy (2016); pacemaker placement (2010); vascular surgery; colectomy (Right, 2016); Toe amputation (Right, 2017); Toe amputation (Right, 2018); Cardiac catheterization; Cardiac defibrillator placement (2010); Coronary angioplasty; Cardiac catheterization (2017); Cardiac catheterization (2018); Toe amputation (Left, 2020);  IR TUNNELED CVC PLACE WO SQ PORT/PUMP > 5 YEARS (6/14/2021); and Toe amputation (Left, 7/26/2022). Assessment   Body Structures, Functions, Activity Limitations Requiring Skilled Therapeutic Intervention: Decreased functional mobility ; Decreased strength; Increased pain;Decreased safe awareness;Decreased endurance;Decreased sensation;Decreased balance  Assessment: Pt presents 4 days s/p admission for BL LE/foot wounds w/ poor healing ; underwent 2nd toe amputation on 7/26, now POD 3, heel WB. He lives w/ dtr and grand kids, in 2 level home w/ ramped entrance, able to live on the main level, mod ind PLOF, uses WC for mobility and performs transfers at mod ind w/o AD. he presents w/ the above listed deficits, however, he is primarily limited by pain. Requires no physical assist, fair + balance, good strength to propel manual w/c, good w/c mobility, slightly impulsive and unsafe around affected LE's, operating near baseline. Recmomending home w/ assist PRN. Therapy Prognosis: Good  Decision Making: Medium Complexity  Requires PT Follow-Up: Yes  Activity Tolerance  Activity Tolerance: Patient tolerated treatment well     Treatment:  Therapeutic Activity Training:   Therapeutic activity training was instructed today. Cues were given for safety, sequence, UE/LE placement, awareness, and balance. Activities performed today included bed mobility training, sup-sit, sit-stand, SPT.     Wheelchair Training:  Wheelchair training performed this session  Emphasis on device mgmt, propulsion, path mgmt, set-up, sequencing, safety  Short hallway mobility and functional mobility this session    Plan   Plan  Plan:  (2-3+)  Plan weeks: 1  Current Treatment Recommendations: Strengthening, ROM, Balance training, Endurance training, Functional mobility training, Transfer training, Wheelchair mobility training, ADL/Self-care training, Patient/Caregiver education & training, Safety education & training, Pain management, Equipment evaluation, education, & procurement, Modalities, Positioning, Therapeutic activities  Safety Devices  Type of Devices: Call light within reach, Chair alarm in place, Gait belt, Patient at risk for falls, Left in chair  Restraints  Restraints Initially in Place: No     Restrictions  Restrictions/Precautions  Restrictions/Precautions: Fall Risk, General Precautions, Weight Bearing  Lower Extremity Weight Bearing Restrictions  Left Lower Extremity Weight Bearing:  (heal WB)     Subjective   General  Chart Reviewed: Yes  Patient assessed for rehabilitation services?: Yes  Family / Caregiver Present: No  Follows Commands: Within Functional Limits  General Comment  Comments: 6/10 L LE/foot throbbing pain  Subjective  Subjective: I usually get around in my hover round, the one tire is flat right now and my dtr is supposed to be helping me w/ it. Social/Functional History   Social/Functional History  Lives With: Daughter (and grandchildren)  Type of Home: House  Home Layout: Two level (Uses one story for him)  Home Access: Stairs to enter with rails,Ramped entrance  Entrance Stairs - Number of Steps: 8 in front and ramp to back door  Entrance Stairs - Rails: Right  Bathroom Shower/Tub: Walk-in shower (Can drive hover round into shower)  Bathroom Toilet: Standard  Bathroom Equipment: Tub transfer bench  Bathroom Accessibility: Accessible  Home Equipment: Wheelchair and hoverround  Receives Help From: Home health,Family (states bathed and dressed self. Has HH to do housekeeping chores)  ADL Assistance: Independent  Homemaking Assistance: Needs assistance  Meal Prep: Moderate  Homemaking Responsibilities: No  Ambulation Assistance:  (Unable to stand on feet at this time.  Gets around in w/c and hover round at home.)  Active : No  Occupation: Retired  Type of occupation: Trained truck drivers  Leisure & Hobbies: Likes to chart maps for fun  Comments: Currently unable to use hover round d/t flat tire    Cognition Orientation  Overall Orientation Status: Within Normal Limits  Orientation Level: Oriented X4  Cognition  Overall Cognitive Status: WFL     Objective   Heart Rate: 60  Heart Rate Source: Telemetry  BP: (!) 177/99  BP Location: Left upper arm  BP Method: Automatic  Patient Position: Semi fowlers  MAP (Calculated): 125  Resp: 17  SpO2: 98 %  O2 Device: Nasal cannula     Observation/Palpation  Posture: Good  Observation: Supine, awake, alert, agreeable. He is transfering well, deferring to w/c at baseline, frustrated about length of stay and family support at this time. Tele, BP cuff, bed alarm, BL post-op shoes in place.       AROM RLE (degrees)  RLE AROM: WFL  AROM LLE (degrees)  LLE AROM : WFL  Strength RLE  Strength RLE: WFL  Comment: grossly >3/5 as observed by functional mobility  Strength LLE  Strength LLE: WFL  Comment: grossly >3/5 as observed by functional mobility     Bed mobility  Supine to Sit: Stand by assistance  Scooting: Stand by assistance  Transfers  Sit to Stand: Stand by assistance  Stand to sit: Stand by assistance  Bed to Chair: Stand by assistance  Stand Pivot Transfers: Stand by assistance  Ambulation  Comments: pt does not ambulate at baseline ; defers to W/C as primary form of mobility  More Ambulation?: No  Stairs/Curb  Stairs?: No  Wheelchair Activities  Wheelchair Type: Standard  Level of Assistance for pressure relief activities: Supervision  Wheelchair Parts Management: Yes  Left Brakes Level of Assistance: Modified independent  Right Brakes Level of Assistance: Modified independent  Propulsion: Yes  Propulsion 1  Propulsion: Manual  Level: Level Tile  Method: RUE;LUE;RLE;LLE  Level of Assistance: Supervision  Description/ Details: good quality and coordination ; fair safety awareness and slightly impulsive  Distance: 150 ft     Balance  Sitting - Static: Good  Sitting - Dynamic: Good  Standing - Static: Fair;+    AM-PAC Score  AM-PAC Inpatient Mobility Raw Score : 17 (07/29/22 1252)  AM-PAC Inpatient T-Scale Score : 42.13 (07/29/22 1252)  Mobility Inpatient CMS 0-100% Score: 50.57 (07/29/22 1252)  Mobility Inpatient CMS G-Code Modifier : CK (07/29/22 1252)    Goals  Short Term Goals  Time Frame for Short term goals: 1 week  Short term goal 1: pt will complete bed mobility at mod ind  Short term goal 2: pt will complete stand pivot at mod ind  Short term goal 3: pt will propel manual w/c 300 ft at mod ind for performance of community/household distances  Additional Goals?: No  Patient Goals   Patient goals : return home      Therapy Time   Individual Concurrent Group Co-treatment   Time In 0946         Time Out 1022         Minutes 36         Timed Code Treatment Minutes: 26 Minutes (TA, W/C)       Carol Katz, PT, DPT rolling walker

## 2023-01-11 ENCOUNTER — HOSPITAL ENCOUNTER (OUTPATIENT)
Dept: INTERVENTIONAL RADIOLOGY/VASCULAR | Age: 73
Discharge: HOME | End: 2023-01-11
Payer: MEDICARE

## 2023-01-11 VITALS
HEART RATE: 62 BPM | WEIGHT: 260 LBS | TEMPERATURE: 97.5 F | DIASTOLIC BLOOD PRESSURE: 72 MMHG | RESPIRATION RATE: 18 BRPM | SYSTOLIC BLOOD PRESSURE: 168 MMHG | HEIGHT: 72 IN | BODY MASS INDEX: 35.21 KG/M2 | OXYGEN SATURATION: 94 %

## 2023-01-11 DIAGNOSIS — Z79.2 ADMINISTRATION OF LONG-TERM PROPHYLACTIC ANTIBIOTICS: ICD-10-CM

## 2023-01-11 LAB
APTT: 38.7 SECONDS (ref 25.1–37.1)
INR BLD: 1.08 INDEX
PROTHROMBIN TIME: 14 SECONDS (ref 11.7–14.5)

## 2023-01-11 PROCEDURE — 2709999900 IR REMOVE TUNNELED CVAD WO SQ PORT/PUMP

## 2023-01-11 PROCEDURE — 7100000010 HC PHASE II RECOVERY - FIRST 15 MIN

## 2023-01-11 PROCEDURE — 6370000000 HC RX 637 (ALT 250 FOR IP): Performed by: RADIOLOGY

## 2023-01-11 PROCEDURE — 85610 PROTHROMBIN TIME: CPT

## 2023-01-11 PROCEDURE — 7100000011 HC PHASE II RECOVERY - ADDTL 15 MIN

## 2023-01-11 PROCEDURE — 85730 THROMBOPLASTIN TIME PARTIAL: CPT

## 2023-01-11 RX ORDER — AMLODIPINE BESYLATE 10 MG/1
10 TABLET ORAL ONCE
Status: COMPLETED | OUTPATIENT
Start: 2023-01-11 | End: 2023-01-11

## 2023-01-11 RX ORDER — CARVEDILOL 25 MG/1
25 TABLET ORAL ONCE
Status: COMPLETED | OUTPATIENT
Start: 2023-01-11 | End: 2023-01-11

## 2023-01-11 RX ADMIN — CARVEDILOL 25 MG: 25 TABLET, FILM COATED ORAL at 10:20

## 2023-01-11 RX ADMIN — AMLODIPINE BESYLATE 10 MG: 10 TABLET ORAL at 10:20

## 2023-01-11 ASSESSMENT — PAIN DESCRIPTION - DESCRIPTORS
DESCRIPTORS: BURNING;STABBING
DESCRIPTORS: STABBING;BURNING

## 2023-01-11 ASSESSMENT — PAIN DESCRIPTION - FREQUENCY: FREQUENCY: CONTINUOUS

## 2023-01-11 ASSESSMENT — PAIN DESCRIPTION - ORIENTATION: ORIENTATION: RIGHT;LEFT

## 2023-01-11 ASSESSMENT — PAIN SCALES - GENERAL: PAINLEVEL_OUTOF10: 8

## 2023-01-11 ASSESSMENT — PAIN DESCRIPTION - PAIN TYPE: TYPE: CHRONIC PAIN

## 2023-01-11 ASSESSMENT — PAIN - FUNCTIONAL ASSESSMENT
PAIN_FUNCTIONAL_ASSESSMENT: 0-10
PAIN_FUNCTIONAL_ASSESSMENT: PREVENTS OR INTERFERES SOME ACTIVE ACTIVITIES AND ADLS

## 2023-01-11 ASSESSMENT — PAIN DESCRIPTION - ONSET: ONSET: ON-GOING

## 2023-01-11 ASSESSMENT — PAIN DESCRIPTION - LOCATION: LOCATION: FOOT

## 2023-01-11 NOTE — DISCHARGE INSTRUCTIONS
Watch for signs of infection    Elevated temperature, redness or pain at site,   Drainage at incision,   Advance Care Planning  People with COVID-19 may have no symptoms, mild symptoms, such as fever, cough, and shortness of breath or they may have more severe illness, developing severe and fatal pneumonia. As a result, Advance Care Planning with attention to naming a health care decision maker (someone you trust to make healthcare decisions for you if you could not speak for yourself) and sharing other health care preferences is important BEFORE a possible health crisis. Please contact your Primary Care Provider to discuss Advance Care Planning. Preventing the Spread of Coronavirus Disease 2019 in Homes and Residential Communities  For the most recent information go to [facility].    Prevention steps for People with confirmed or suspected COVID-19 (including persons under investigation) who do not need to be hospitalized  and   People with confirmed COVID-19 who were hospitalized and determined to be medically stable to go home    Your healthcare provider and public health staff will evaluate whether you can be cared for at home. If it is determined that you do not need to be hospitalized and can be isolated at home, you will be monitored by staff from your local or state health department. You should follow the prevention steps below until a healthcare provider or local or state health department says you can return to your normal activities. Stay home except to get medical care  People who are mildly ill with COVID-19 are able to isolate at home during their illness. You should restrict activities outside your home, except for getting medical care. Do not go to work, school, or public areas. Avoid using public transportation, ride-sharing, or taxis.   Separate yourself from other people and animals in your home  People: As much as possible, you should stay in a specific room and away from other people in your home. Also, you should use a separate bathroom, if available. Animals: You should restrict contact with pets and other animals while you are sick with COVID-19, just like you would around other people. Although there have not been reports of pets or other animals becoming sick with COVID-19, it is still recommended that people sick with COVID-19 limit contact with animals until more information is known about the virus. When possible, have another member of your household care for your animals while you are sick. If you are sick with COVID-19, avoid contact with your pet, including petting, snuggling, being kissed or licked, and sharing food. If you must care for your pet or be around animals while you are sick, wash your hands before and after you interact with pets and wear a facemask. Call ahead before visiting your doctor  If you have a medical appointment, call the healthcare provider and tell them that you have or may have COVID-19. This will help the healthcare providers office take steps to keep other people from getting infected or exposed. Wear a facemask  You should wear a facemask when you are around other people (e.g., sharing a room or vehicle) or pets and before you enter a healthcare providers office. If you are not able to wear a facemask (for example, because it causes trouble breathing), then people who live with you should not stay in the same room with you, or they should wear a facemask if they enter your room. Cover your coughs and sneezes  Cover your mouth and nose with a tissue when you cough or sneeze. Throw used tissues in a lined trash can. Immediately wash your hands with soap and water for at least 20 seconds or, if soap and water are not available, clean your hands with an alcohol-based hand  that contains at least 60% alcohol.   Clean your hands often  Wash your hands often with soap and water for at least 20 seconds, especially after blowing your nose, coughing, or sneezing; going to the bathroom; and before eating or preparing food. If soap and water are not readily available, use an alcohol-based hand  with at least 60% alcohol, covering all surfaces of your hands and rubbing them together until they feel dry.  Soap and water are the best option if hands are visibly dirty. Avoid touching your eyes, nose, and mouth with unwashed hands.  Avoid sharing personal household items  You should not share dishes, drinking glasses, cups, eating utensils, towels, or bedding with other people or pets in your home. After using these items, they should be washed thoroughly with soap and water.  Clean all “high-touch” surfaces everyday  High touch surfaces include counters, tabletops, doorknobs, bathroom fixtures, toilets, phones, keyboards, tablets, and bedside tables. Also, clean any surfaces that may have blood, stool, or body fluids on them. Use a household cleaning spray or wipe, according to the label instructions. Labels contain instructions for safe and effective use of the cleaning product including precautions you should take when applying the product, such as wearing gloves and making sure you have good ventilation during use of the product.  Monitor your symptoms  Seek prompt medical attention if your illness is worsening (e.g., difficulty breathing). Before seeking care, call your healthcare provider and tell them that you have, or are being evaluated for, COVID-19. Put on a facemask before you enter the facility. These steps will help the healthcare provider’s office to keep other people in the office or waiting room from getting infected or exposed. Ask your healthcare provider to call the local or [facility] health department. Persons who are placed under active monitoring or facilitated self-monitoring should follow instructions provided by their local health department or occupational health professionals, as  appropriate. When working with your local health department check their available hours. If you have a medical emergency and need to call 911, notify the dispatch personnel that you have, or are being evaluated for COVID-19. If possible, put on a facemask before emergency medical services arrive. Discontinuing home isolation  Patients with confirmed COVID-19 should remain under home isolation precautions until the risk of secondary transmission to others is thought to be low. The decision to discontinue home isolation precautions should be made on a case-by-case basis, in consultation with healthcare providers and state and local health departments.

## 2023-01-11 NOTE — PROGRESS NOTES
1020- Patient returned to [facility], report given to this nurse from [facility] IRLea Patton is A&Ox4 able to make needs known C/O pain 8/10 in BLE. Heat applied. Pressure dressing to right chest CDI no drainage noted. Patient medicated for BP of 207/95. Beverage of choice given,call light in reach, bed in lowest position. 26- Daughter and grandson here to pick patient up BP is now 168/72. Site to right chest is CDI. Discharge instructions given to patient and his daughter both verbalized understanding. 1045- this nurse and Katelyn DIA assisted patient to get dressed and into W/C.  1055- Patient escorted to car via W/C.

## 2023-01-11 NOTE — OR NURSING
PROCEDURE PERFORMED: Removal Tunneled PICC    INFORMED CONSENT:  Obtained prior to procedure. Consent placed in chart. Dr. Hai Miner ok with elevated blood pressure for procedure, See Mar for Dr. Hai Miner medication order. BARRIER PRECAUTIONS & STERILE TECHNIQUE:               Pt positioned for comfort. Warm blankets given. Pt placed on Vital Monitor. Pt prepped and draped in a sterile fashion with chlorhexadine.     PAIN/LOCAL ANESTHESIA/SEDATION MANAGEMENT:           Local: Lidocaine 1% given by Octavia Smith RTR         INTRAOPERATIVE:           Old dressing removed,cleansed with chloraprep,sutures removed,catheter removed appeared intact, pressure held five minutes to site, Post removal no bleeding noted at site    REPORT CALLED TO: Suzie Freeman

## 2023-01-15 PROCEDURE — 93295 DEV INTERROG REMOTE 1/2/MLT: CPT | Performed by: INTERNAL MEDICINE

## 2023-01-15 PROCEDURE — 93296 REM INTERROG EVL PM/IDS: CPT | Performed by: INTERNAL MEDICINE

## 2023-01-15 PROCEDURE — 93297 REM INTERROG DEV EVAL ICPMS: CPT | Performed by: INTERNAL MEDICINE

## 2023-01-16 ENCOUNTER — PROCEDURE VISIT (OUTPATIENT)
Dept: CARDIOLOGY CLINIC | Age: 73
End: 2023-01-16
Payer: MEDICARE

## 2023-01-16 DIAGNOSIS — Z95.810 ICD (IMPLANTABLE CARDIOVERTER-DEFIBRILLATOR), DUAL, IN SITU: ICD-10-CM

## 2023-01-16 DIAGNOSIS — I49.5 SINUS NODE DYSFUNCTION (HCC): ICD-10-CM

## 2023-02-09 ENCOUNTER — HOSPITAL ENCOUNTER (OUTPATIENT)
Dept: WOUND CARE | Age: 73
Discharge: HOME | End: 2023-02-09
Payer: MEDICARE

## 2023-02-09 VITALS
SYSTOLIC BLOOD PRESSURE: 129 MMHG | HEART RATE: 65 BPM | DIASTOLIC BLOOD PRESSURE: 60 MMHG | TEMPERATURE: 97.5 F | RESPIRATION RATE: 18 BRPM

## 2023-02-09 DIAGNOSIS — E11.621 DIABETIC ULCER OF RIGHT FOOT DUE TO TYPE 2 DIABETES MELLITUS (HCC): ICD-10-CM

## 2023-02-09 DIAGNOSIS — E11.621 DIABETIC ULCER OF TOE OF RIGHT FOOT ASSOCIATED WITH TYPE 2 DIABETES MELLITUS, WITH FAT LAYER EXPOSED (HCC): ICD-10-CM

## 2023-02-09 DIAGNOSIS — E08.621: ICD-10-CM

## 2023-02-09 DIAGNOSIS — L97.405: ICD-10-CM

## 2023-02-09 DIAGNOSIS — L97.522: ICD-10-CM

## 2023-02-09 DIAGNOSIS — E11.42 TYPE 2 DIABETES MELLITUS WITH DIABETIC POLYNEUROPATHY, UNSPECIFIED WHETHER LONG TERM INSULIN USE (HCC): ICD-10-CM

## 2023-02-09 DIAGNOSIS — I73.9 PVD (PERIPHERAL VASCULAR DISEASE) (HCC): ICD-10-CM

## 2023-02-09 DIAGNOSIS — L97.512 DIABETIC ULCER OF TOE OF RIGHT FOOT ASSOCIATED WITH TYPE 2 DIABETES MELLITUS, WITH FAT LAYER EXPOSED (HCC): ICD-10-CM

## 2023-02-09 DIAGNOSIS — L97.524 CHRONIC FOOT ULCER, LEFT, WITH NECROSIS OF BONE (HCC): ICD-10-CM

## 2023-02-09 DIAGNOSIS — L97.519 DIABETIC ULCER OF RIGHT FOOT DUE TO TYPE 2 DIABETES MELLITUS (HCC): ICD-10-CM

## 2023-02-09 DIAGNOSIS — E11.8 DIABETIC FOOT (HCC): ICD-10-CM

## 2023-02-09 DIAGNOSIS — M86.9 TOE OSTEOMYELITIS (HCC): Primary | ICD-10-CM

## 2023-02-09 PROCEDURE — 87070 CULTURE OTHR SPECIMN AEROBIC: CPT

## 2023-02-09 PROCEDURE — 11042 DBRDMT SUBQ TIS 1ST 20SQCM/<: CPT

## 2023-02-09 PROCEDURE — 99214 OFFICE O/P EST MOD 30 MIN: CPT

## 2023-02-09 PROCEDURE — 87076 CULTURE ANAEROBE IDENT EACH: CPT

## 2023-02-09 PROCEDURE — 87186 SC STD MICRODIL/AGAR DIL: CPT

## 2023-02-09 PROCEDURE — 11045 DBRDMT SUBQ TISS EACH ADDL: CPT

## 2023-02-09 PROCEDURE — 87075 CULTR BACTERIA EXCEPT BLOOD: CPT

## 2023-02-09 PROCEDURE — 87077 CULTURE AEROBIC IDENTIFY: CPT

## 2023-02-09 RX ORDER — OXYCODONE AND ACETAMINOPHEN 7.5; 325 MG/1; MG/1
1 TABLET ORAL EVERY 8 HOURS PRN
Qty: 21 TABLET | Refills: 0 | Status: ON HOLD | OUTPATIENT
Start: 2023-02-09 | End: 2023-02-16

## 2023-02-09 RX ORDER — BETAMETHASONE DIPROPIONATE 0.05 %
OINTMENT (GRAM) TOPICAL ONCE
OUTPATIENT
Start: 2023-02-09 | End: 2023-02-09

## 2023-02-09 RX ORDER — CLOBETASOL PROPIONATE 0.5 MG/G
OINTMENT TOPICAL ONCE
OUTPATIENT
Start: 2023-02-09 | End: 2023-02-09

## 2023-02-09 RX ORDER — BACITRACIN, NEOMYCIN, POLYMYXIN B 400; 3.5; 5 [USP'U]/G; MG/G; [USP'U]/G
OINTMENT TOPICAL ONCE
OUTPATIENT
Start: 2023-02-09 | End: 2023-02-09

## 2023-02-09 RX ORDER — LIDOCAINE HYDROCHLORIDE 40 MG/ML
SOLUTION TOPICAL ONCE
OUTPATIENT
Start: 2023-02-09 | End: 2023-02-09

## 2023-02-09 RX ORDER — LIDOCAINE HYDROCHLORIDE 20 MG/ML
JELLY TOPICAL ONCE
OUTPATIENT
Start: 2023-02-09 | End: 2023-02-09

## 2023-02-09 RX ORDER — BACITRACIN ZINC AND POLYMYXIN B SULFATE 500; 1000 [USP'U]/G; [USP'U]/G
OINTMENT TOPICAL ONCE
OUTPATIENT
Start: 2023-02-09 | End: 2023-02-09

## 2023-02-09 RX ORDER — LIDOCAINE 40 MG/G
CREAM TOPICAL ONCE
OUTPATIENT
Start: 2023-02-09 | End: 2023-02-09

## 2023-02-09 RX ORDER — SULFAMETHOXAZOLE AND TRIMETHOPRIM 400; 80 MG/1; MG/1
1 TABLET ORAL DAILY
Qty: 7 TABLET | Refills: 0 | Status: ON HOLD | OUTPATIENT
Start: 2023-02-09 | End: 2023-02-16

## 2023-02-09 RX ORDER — GENTAMICIN SULFATE 1 MG/G
OINTMENT TOPICAL ONCE
OUTPATIENT
Start: 2023-02-09 | End: 2023-02-09

## 2023-02-09 RX ORDER — LIDOCAINE 50 MG/G
OINTMENT TOPICAL ONCE
OUTPATIENT
Start: 2023-02-09 | End: 2023-02-09

## 2023-02-09 RX ORDER — GENTAMICIN SULFATE 1 MG/G
CREAM TOPICAL
Qty: 60 G | Refills: 3 | Status: ON HOLD | OUTPATIENT
Start: 2023-02-09

## 2023-02-09 RX ORDER — GINSENG 100 MG
CAPSULE ORAL ONCE
OUTPATIENT
Start: 2023-02-09 | End: 2023-02-09

## 2023-02-09 ASSESSMENT — PAIN DESCRIPTION - ORIENTATION: ORIENTATION: LEFT;RIGHT

## 2023-02-09 ASSESSMENT — PAIN DESCRIPTION - LOCATION: LOCATION: FOOT

## 2023-02-09 ASSESSMENT — PAIN DESCRIPTION - FREQUENCY: FREQUENCY: CONTINUOUS

## 2023-02-09 ASSESSMENT — PAIN SCALES - GENERAL: PAINLEVEL_OUTOF10: 10

## 2023-02-09 ASSESSMENT — PAIN - FUNCTIONAL ASSESSMENT: PAIN_FUNCTIONAL_ASSESSMENT: PREVENTS OR INTERFERES SOME ACTIVE ACTIVITIES AND ADLS

## 2023-02-09 ASSESSMENT — PAIN DESCRIPTION - PAIN TYPE: TYPE: CHRONIC PAIN

## 2023-02-09 ASSESSMENT — PAIN DESCRIPTION - DESCRIPTORS: DESCRIPTORS: BURNING;STABBING

## 2023-02-09 ASSESSMENT — PAIN DESCRIPTION - ONSET: ONSET: ON-GOING

## 2023-02-09 NOTE — PROGRESS NOTES
Multilayer Compression Wrap   (Not Unna) Below the Knee    NAME:  Patrick Manzo  YOB: 1950  MEDICAL RECORD NUMBER:  [id number]  DATE:  2/9/2023    Multilayer compression wrap: Removed old Multilayer wrap if indicated and wash leg with mild soap/water. Applied moisturizing agent to dry skin as needed. Applied primary and secondary dressing as ordered. Applied multilayered dressing below the knee to right lower leg. Applied multilayered dressing below the knee to left lower leg. Instructed patient/caregiver not to remove dressing and to keep it clean and dry. Instructed patient/caregiver on complications to report to provider, such as pain, numbness in toes, heavy drainage, and slippage of dressing. Instructed patient on purpose of compression dressing and on activity and exercise recommendations.       Electronically signed by Rajwinder Mann LPN on 3/3/7654 at 31:51 PM

## 2023-02-09 NOTE — DISCHARGE INSTRUCTIONS
PHYSICIAN ORDERS AND DISCHARGE INSTRUCTIONS     NOTE: Upon discharge from the [address], you will receive a patient experience survey. We would be grateful if you would take the time to fill this survey out. Wound care order history:                    Vascular studies: 6/8/2021 Date 11/12/2021              Imaging:  XRAY ordered for left foot 4/19/2022              Cultures: Left 2nd toe bone obtained on 11/12/2021              Grafts:  Date              Antibiotics:              Earlier Wound care treatments:              Primary care physician:     Continuing wound care orders and information:              Residence:                Continue home health care with:  Does not remember           Wound Medications:              Wound cleansing:                          Do not scrub or use excessive force. Wash hands with soap and water before and after dressing changes. Prior to applying a clean dressing, cleanse wound with normal saline,                                wound cleanser, or mild soap and water. Ask the physician or nurse before getting the wound(s) wet in a shower              Daily Wound management:                          Keep weight off wounds and reposition every 2 hours. Avoid standing for long periods of time. Apply wraps/stockings in AM and remove at bedtime. If swelling is present, elevate legs to the level of the heart or above for 30                                                         minutes 4-5 times a day and/or when sitting. When taking antibiotics take entire prescription as ordered by physician                                                         do not stop taking until medicine is all gone.           Wound Care Notes:            Given surgical shoes 11/22/21         Orders for this week: 23     FAX ORDERS TO [facility]! Rx: [facility] on [address] in [facility]    Right Great Toe cultured 23     Bilateral lower extremities (leg, feet, and toe wounds) -- wash with soap and water, pat dry. Moisturize intact skin of lower extremities with A+D. Apply Santyl and saline damp Hydrofera blue cut to size of wounds, cover with ABD or Sorbex. Right Medial ankle/leg only - Santyl and Gentamicin and cover with Large Sorbex. Wrap with Coban 2 Lite. Be sure to keep dry. Change 3 times weekly once supplies available (will not need changed Friday 2/10/23 due to dressing change at wound care center on 23). Weekly virtual visits with Braxton Hernandez CNP on  starting 23 (home care to coordinate visits around 12-1p). Call [phone] for any questions or concerns.   Date__________   Time____________  Central [phone]

## 2023-02-11 ENCOUNTER — APPOINTMENT (OUTPATIENT)
Dept: GENERAL RADIOLOGY | Age: 73
DRG: 981 | End: 2023-02-11
Payer: MEDICARE

## 2023-02-11 ENCOUNTER — HOSPITAL ENCOUNTER (INPATIENT)
Age: 73
LOS: 23 days | Discharge: HOME | DRG: 981 | End: 2023-03-06
Attending: EMERGENCY MEDICINE | Admitting: STUDENT IN AN ORGANIZED HEALTH CARE EDUCATION/TRAINING PROGRAM
Payer: MEDICARE

## 2023-02-11 DIAGNOSIS — R77.8 ELEVATED TROPONIN: ICD-10-CM

## 2023-02-11 DIAGNOSIS — L97.405: ICD-10-CM

## 2023-02-11 DIAGNOSIS — N17.9 ACUTE KIDNEY INJURY SUPERIMPOSED ON CHRONIC KIDNEY DISEASE (HCC): ICD-10-CM

## 2023-02-11 DIAGNOSIS — L97.522 ULCER OF BOTH FEET WITH FAT LAYER EXPOSED (HCC): ICD-10-CM

## 2023-02-11 DIAGNOSIS — I50.9 ACUTE ON CHRONIC HEART FAILURE, UNSPECIFIED HEART FAILURE TYPE (HCC): ICD-10-CM

## 2023-02-11 DIAGNOSIS — J96.02 ACUTE RESPIRATORY FAILURE WITH HYPOXIA AND HYPERCAPNIA (HCC): Primary | ICD-10-CM

## 2023-02-11 DIAGNOSIS — E11.8 DIABETIC FOOT (HCC): ICD-10-CM

## 2023-02-11 DIAGNOSIS — J96.01 ACUTE RESPIRATORY FAILURE WITH HYPOXIA AND HYPERCAPNIA (HCC): Primary | ICD-10-CM

## 2023-02-11 DIAGNOSIS — L97.512 ULCER OF BOTH FEET WITH FAT LAYER EXPOSED (HCC): ICD-10-CM

## 2023-02-11 DIAGNOSIS — E87.4 ACIDOSIS, METABOLIC, WITH RESPIRATORY ACIDOSIS: ICD-10-CM

## 2023-02-11 DIAGNOSIS — E87.5 HYPERKALEMIA: ICD-10-CM

## 2023-02-11 DIAGNOSIS — N18.9 ACUTE KIDNEY INJURY SUPERIMPOSED ON CHRONIC KIDNEY DISEASE (HCC): ICD-10-CM

## 2023-02-11 DIAGNOSIS — E08.621: ICD-10-CM

## 2023-02-11 LAB
ALBUMIN SERPL-MCNC: 3.3 GM/DL (ref 3.4–5)
ALP BLD-CCNC: 101 IU/L (ref 40–129)
ALT SERPL-CCNC: 12 U/L (ref 10–40)
ANION GAP SERPL CALCULATED.3IONS-SCNC: 13 MMOL/L (ref 4–16)
ANION GAP SERPL CALCULATED.3IONS-SCNC: 8 MMOL/L (ref 4–16)
ANION GAP SERPL CALCULATED.3IONS-SCNC: 9 MMOL/L (ref 4–16)
AST SERPL-CCNC: 19 IU/L (ref 15–37)
BASE EXCESS: 10 (ref 0–3.3)
BASE EXCESS: 11 (ref 0–3.3)
BASE EXCESS: 13 (ref 0–3.3)
BASE EXCESS: 15 (ref 0–3.3)
BASOPHILS ABSOLUTE: 0 K/CU MM
BASOPHILS RELATIVE PERCENT: 0.6 % (ref 0–1)
BILIRUB SERPL-MCNC: 0.5 MG/DL (ref 0–1)
BUN SERPL-MCNC: 61 MG/DL (ref 6–23)
BUN SERPL-MCNC: 71 MG/DL (ref 6–23)
BUN SERPL-MCNC: 73 MG/DL (ref 6–23)
CALCIUM SERPL-MCNC: 7.8 MG/DL (ref 8.3–10.6)
CALCIUM SERPL-MCNC: 8.2 MG/DL (ref 8.3–10.6)
CALCIUM SERPL-MCNC: 8.2 MG/DL (ref 8.3–10.6)
CARBON MONOXIDE, BLOOD: 3.4 % (ref 0–5)
CARBON MONOXIDE, BLOOD: 4 % (ref 0–5)
CHLORIDE BLD-SCNC: 108 MMOL/L (ref 99–110)
CHLORIDE BLD-SCNC: 109 MMOL/L (ref 99–110)
CHLORIDE BLD-SCNC: 110 MMOL/L (ref 99–110)
CHP ED QC CHECK: 214
CO2 CONTENT: 20.6 MMOL/L (ref 19–24)
CO2 CONTENT: 20.8 MMOL/L (ref 19–24)
CO2: 16 MMOL/L (ref 21–32)
CO2: 21 MMOL/L (ref 21–32)
CO2: 21 MMOL/L (ref 21–32)
COMMENT: ABNORMAL
CREAT SERPL-MCNC: 4.1 MG/DL (ref 0.9–1.3)
CREAT SERPL-MCNC: 4.8 MG/DL (ref 0.9–1.3)
CREAT SERPL-MCNC: 4.8 MG/DL (ref 0.9–1.3)
DIFFERENTIAL TYPE: ABNORMAL
EOSINOPHILS ABSOLUTE: 0 K/CU MM
EOSINOPHILS RELATIVE PERCENT: 0.6 % (ref 0–3)
GFR SERPL CREATININE-BSD FRML MDRD: 12 ML/MIN/1.73M2
GFR SERPL CREATININE-BSD FRML MDRD: 12 ML/MIN/1.73M2
GFR SERPL CREATININE-BSD FRML MDRD: 15 ML/MIN/1.73M2
GLUCOSE BLD-MCNC: 199 MG/DL (ref 70–99)
GLUCOSE BLD-MCNC: 214 MG/DL (ref 70–99)
GLUCOSE BLD-MCNC: 78 MG/DL (ref 70–99)
GLUCOSE SERPL-MCNC: 170 MG/DL (ref 70–99)
GLUCOSE SERPL-MCNC: 61 MG/DL (ref 70–99)
GLUCOSE SERPL-MCNC: 70 MG/DL (ref 70–99)
HBV SURFACE AB SERPL IA-ACNC: <3.5 M[IU]/ML
HBV SURFACE AG SERPL QL IA: NON REACTIVE
HCO3 ARTERIAL: 18.9 MMOL/L (ref 18–23)
HCO3 ARTERIAL: 19 MMOL/L (ref 18–23)
HCO3 VENOUS: 16.3 MMOL/L (ref 19–25)
HCO3 VENOUS: 18.1 MMOL/L (ref 19–25)
HCT VFR BLD CALC: 37 % (ref 42–52)
HEMOGLOBIN: 10.3 GM/DL (ref 13.5–18)
IMMATURE NEUTROPHIL %: 0.2 % (ref 0–0.43)
LYMPHOCYTES ABSOLUTE: 1.2 K/CU MM
LYMPHOCYTES RELATIVE PERCENT: 25.4 % (ref 24–44)
MCH RBC QN AUTO: 25.7 PG (ref 27–31)
MCHC RBC AUTO-ENTMCNC: 27.8 % (ref 32–36)
MCV RBC AUTO: 92.3 FL (ref 78–100)
METHEMOGLOBIN ARTERIAL: 0.6 %
METHEMOGLOBIN ARTERIAL: 1.2 %
MONOCYTES ABSOLUTE: 0.4 K/CU MM
MONOCYTES RELATIVE PERCENT: 9.3 % (ref 0–4)
NUCLEATED RBC %: 1.3 %
O2 SAT, VEN: 59.1 % (ref 50–70)
O2 SAT, VEN: 82.9 % (ref 50–70)
O2 SATURATION: 93.5 % (ref 96–97)
O2 SATURATION: 94.4 % (ref 96–97)
PCO2 ARTERIAL: 51 MMHG (ref 32–45)
PCO2 ARTERIAL: 61 MMHG (ref 32–45)
PCO2, VEN: 63 MMHG (ref 38–52)
PCO2, VEN: 67 MMHG (ref 38–52)
PDW BLD-RTO: 18.4 % (ref 11.7–14.9)
PH BLOOD: 7.1 (ref 7.34–7.45)
PH BLOOD: 7.18 (ref 7.34–7.45)
PH VENOUS: 7.02 (ref 7.32–7.42)
PH VENOUS: 7.04 (ref 7.32–7.42)
PLATELET # BLD: 222 K/CU MM (ref 140–440)
PMV BLD AUTO: 10.6 FL (ref 7.5–11.1)
PO2 ARTERIAL: 114 MMHG (ref 75–100)
PO2 ARTERIAL: 89 MMHG (ref 75–100)
PO2, VEN: 41 MMHG (ref 28–48)
PO2, VEN: 61 MMHG (ref 28–48)
POTASSIUM SERPL-SCNC: 5.6 MMOL/L (ref 3.5–5.1)
POTASSIUM SERPL-SCNC: 6.5 MMOL/L (ref 3.5–5.1)
POTASSIUM SERPL-SCNC: 6.9 MMOL/L (ref 3.5–5.1)
PRO-BNP: ABNORMAL PG/ML
RAPID INFLUENZA  B AGN: NEGATIVE
RAPID INFLUENZA A AGN: NEGATIVE
RBC # BLD: 4.01 M/CU MM (ref 4.6–6.2)
SARS-COV-2 RDRP RESP QL NAA+PROBE: NOT DETECTED
SEGMENTED NEUTROPHILS ABSOLUTE COUNT: 3 K/CU MM
SEGMENTED NEUTROPHILS RELATIVE PERCENT: 63.9 % (ref 36–66)
SODIUM BLD-SCNC: 137 MMOL/L (ref 135–145)
SODIUM BLD-SCNC: 139 MMOL/L (ref 135–145)
SODIUM BLD-SCNC: 139 MMOL/L (ref 135–145)
SOURCE: NORMAL
TOTAL IMMATURE NEUTOROPHIL: 0.01 K/CU MM
TOTAL NUCLEATED RBC: 0.1 K/CU MM
TOTAL PROTEIN: 7.5 GM/DL (ref 6.4–8.2)
TROPONIN T: 0.11 NG/ML
WBC # BLD: 4.6 K/CU MM (ref 4–10.5)

## 2023-02-11 PROCEDURE — 84484 ASSAY OF TROPONIN QUANT: CPT

## 2023-02-11 PROCEDURE — 0BH17EZ INSERTION OF ENDOTRACHEAL AIRWAY INTO TRACHEA, VIA NATURAL OR ARTIFICIAL OPENING: ICD-10-PCS | Performed by: STUDENT IN AN ORGANIZED HEALTH CARE EDUCATION/TRAINING PROGRAM

## 2023-02-11 PROCEDURE — 94002 VENT MGMT INPAT INIT DAY: CPT

## 2023-02-11 PROCEDURE — 6370000000 HC RX 637 (ALT 250 FOR IP): Performed by: INTERNAL MEDICINE

## 2023-02-11 PROCEDURE — 80048 BASIC METABOLIC PNL TOTAL CA: CPT

## 2023-02-11 PROCEDURE — 96374 THER/PROPH/DIAG INJ IV PUSH: CPT

## 2023-02-11 PROCEDURE — 6360000002 HC RX W HCPCS: Performed by: NURSE PRACTITIONER

## 2023-02-11 PROCEDURE — 6360000002 HC RX W HCPCS: Performed by: EMERGENCY MEDICINE

## 2023-02-11 PROCEDURE — 2580000003 HC RX 258: Performed by: EMERGENCY MEDICINE

## 2023-02-11 PROCEDURE — 2580000003 HC RX 258: Performed by: NURSE PRACTITIONER

## 2023-02-11 PROCEDURE — 87040 BLOOD CULTURE FOR BACTERIA: CPT

## 2023-02-11 PROCEDURE — 80053 COMPREHEN METABOLIC PANEL: CPT

## 2023-02-11 PROCEDURE — 93005 ELECTROCARDIOGRAM TRACING: CPT | Performed by: EMERGENCY MEDICINE

## 2023-02-11 PROCEDURE — 6360000002 HC RX W HCPCS: Performed by: INTERNAL MEDICINE

## 2023-02-11 PROCEDURE — P9047 ALBUMIN (HUMAN), 25%, 50ML: HCPCS | Performed by: INTERNAL MEDICINE

## 2023-02-11 PROCEDURE — 87205 SMEAR GRAM STAIN: CPT

## 2023-02-11 PROCEDURE — 71045 X-RAY EXAM CHEST 1 VIEW: CPT

## 2023-02-11 PROCEDURE — 2000000000 HC ICU R&B

## 2023-02-11 PROCEDURE — 6370000000 HC RX 637 (ALT 250 FOR IP): Performed by: NURSE PRACTITIONER

## 2023-02-11 PROCEDURE — 94640 AIRWAY INHALATION TREATMENT: CPT

## 2023-02-11 PROCEDURE — 82805 BLOOD GASES W/O2 SATURATION: CPT

## 2023-02-11 PROCEDURE — 5A1955Z RESPIRATORY VENTILATION, GREATER THAN 96 CONSECUTIVE HOURS: ICD-10-PCS | Performed by: SPECIALIST

## 2023-02-11 PROCEDURE — 87070 CULTURE OTHR SPECIMN AEROBIC: CPT

## 2023-02-11 PROCEDURE — 02HV33Z INSERTION OF INFUSION DEVICE INTO SUPERIOR VENA CAVA, PERCUTANEOUS APPROACH: ICD-10-PCS | Performed by: SPECIALIST

## 2023-02-11 PROCEDURE — 31500 INSERT EMERGENCY AIRWAY: CPT

## 2023-02-11 PROCEDURE — 87635 SARS-COV-2 COVID-19 AMP PRB: CPT

## 2023-02-11 PROCEDURE — 90935 HEMODIALYSIS ONE EVALUATION: CPT

## 2023-02-11 PROCEDURE — 82962 GLUCOSE BLOOD TEST: CPT

## 2023-02-11 PROCEDURE — 2500000003 HC RX 250 WO HCPCS: Performed by: EMERGENCY MEDICINE

## 2023-02-11 PROCEDURE — 96375 TX/PRO/DX INJ NEW DRUG ADDON: CPT

## 2023-02-11 PROCEDURE — 2500000003 HC RX 250 WO HCPCS: Performed by: NURSE PRACTITIONER

## 2023-02-11 PROCEDURE — 82803 BLOOD GASES ANY COMBINATION: CPT

## 2023-02-11 PROCEDURE — 2700000000 HC OXYGEN THERAPY PER DAY

## 2023-02-11 PROCEDURE — 36600 WITHDRAWAL OF ARTERIAL BLOOD: CPT

## 2023-02-11 PROCEDURE — 94660 CPAP INITIATION&MGMT: CPT

## 2023-02-11 PROCEDURE — 86706 HEP B SURFACE ANTIBODY: CPT

## 2023-02-11 PROCEDURE — 99285 EMERGENCY DEPT VISIT HI MDM: CPT

## 2023-02-11 PROCEDURE — 2580000003 HC RX 258: Performed by: INTERNAL MEDICINE

## 2023-02-11 PROCEDURE — 5A1D70Z PERFORMANCE OF URINARY FILTRATION, INTERMITTENT, LESS THAN 6 HOURS PER DAY: ICD-10-PCS | Performed by: INTERNAL MEDICINE

## 2023-02-11 PROCEDURE — 85025 COMPLETE CBC W/AUTO DIFF WBC: CPT

## 2023-02-11 PROCEDURE — 87804 INFLUENZA ASSAY W/OPTIC: CPT

## 2023-02-11 PROCEDURE — 86704 HEP B CORE ANTIBODY TOTAL: CPT

## 2023-02-11 PROCEDURE — 94664 DEMO&/EVAL PT USE INHALER: CPT

## 2023-02-11 PROCEDURE — 36592 COLLECT BLOOD FROM PICC: CPT

## 2023-02-11 PROCEDURE — 6360000002 HC RX W HCPCS

## 2023-02-11 PROCEDURE — 83880 ASSAY OF NATRIURETIC PEPTIDE: CPT

## 2023-02-11 PROCEDURE — 99222 1ST HOSP IP/OBS MODERATE 55: CPT | Performed by: INTERNAL MEDICINE

## 2023-02-11 PROCEDURE — 6370000000 HC RX 637 (ALT 250 FOR IP): Performed by: EMERGENCY MEDICINE

## 2023-02-11 PROCEDURE — 87340 HEPATITIS B SURFACE AG IA: CPT

## 2023-02-11 RX ORDER — DEXTROSE MONOHYDRATE 100 MG/ML
INJECTION, SOLUTION INTRAVENOUS CONTINUOUS PRN
Status: DISCONTINUED | OUTPATIENT
Start: 2023-02-11 | End: 2023-03-07 | Stop reason: HOSPADM

## 2023-02-11 RX ORDER — CLOPIDOGREL BISULFATE 75 MG/1
75 TABLET ORAL DAILY
Status: DISCONTINUED | OUTPATIENT
Start: 2023-02-12 | End: 2023-03-07 | Stop reason: HOSPADM

## 2023-02-11 RX ORDER — PROPOFOL 10 MG/ML
5-80 INJECTION, EMULSION INTRAVENOUS CONTINUOUS
Status: DISCONTINUED | OUTPATIENT
Start: 2023-02-11 | End: 2023-02-20

## 2023-02-11 RX ORDER — SODIUM CHLORIDE 9 MG/ML
INJECTION, SOLUTION INTRAVENOUS CONTINUOUS
Status: DISCONTINUED | OUTPATIENT
Start: 2023-02-11 | End: 2023-02-11

## 2023-02-11 RX ORDER — MIDAZOLAM HYDROCHLORIDE 2 MG/2ML
2 INJECTION, SOLUTION INTRAMUSCULAR; INTRAVENOUS ONCE
Status: COMPLETED | OUTPATIENT
Start: 2023-02-11 | End: 2023-02-11

## 2023-02-11 RX ORDER — CALCIUM GLUCONATE 94 MG/ML
1000 INJECTION, SOLUTION INTRAVENOUS ONCE
Status: COMPLETED | OUTPATIENT
Start: 2023-02-11 | End: 2023-02-11

## 2023-02-11 RX ORDER — KETAMINE HYDROCHLORIDE 10 MG/ML
80 INJECTION INTRAMUSCULAR; INTRAVENOUS ONCE
Status: DISCONTINUED | OUTPATIENT
Start: 2023-02-11 | End: 2023-02-12

## 2023-02-11 RX ORDER — IPRATROPIUM BROMIDE AND ALBUTEROL SULFATE 2.5; .5 MG/3ML; MG/3ML
1 SOLUTION RESPIRATORY (INHALATION)
Status: DISCONTINUED | OUTPATIENT
Start: 2023-02-11 | End: 2023-02-25

## 2023-02-11 RX ORDER — CARVEDILOL 25 MG/1
25 TABLET ORAL 2 TIMES DAILY
Status: DISCONTINUED | OUTPATIENT
Start: 2023-02-11 | End: 2023-02-12

## 2023-02-11 RX ORDER — ALBUMIN (HUMAN) 12.5 G/50ML
12.5 SOLUTION INTRAVENOUS ONCE
Status: COMPLETED | OUTPATIENT
Start: 2023-02-11 | End: 2023-02-11

## 2023-02-11 RX ORDER — FUROSEMIDE 10 MG/ML
40 INJECTION INTRAMUSCULAR; INTRAVENOUS ONCE
Status: COMPLETED | OUTPATIENT
Start: 2023-02-11 | End: 2023-02-11

## 2023-02-11 RX ORDER — CHLORHEXIDINE GLUCONATE 0.12 MG/ML
15 RINSE ORAL 2 TIMES DAILY
Status: DISCONTINUED | OUTPATIENT
Start: 2023-02-11 | End: 2023-02-20

## 2023-02-11 RX ORDER — ETOMIDATE 2 MG/ML
20 INJECTION INTRAVENOUS ONCE
Status: COMPLETED | OUTPATIENT
Start: 2023-02-11 | End: 2023-02-11

## 2023-02-11 RX ORDER — ASPIRIN 81 MG/1
81 TABLET, CHEWABLE ORAL DAILY
Status: DISCONTINUED | OUTPATIENT
Start: 2023-02-12 | End: 2023-03-07 | Stop reason: HOSPADM

## 2023-02-11 RX ORDER — SODIUM CHLORIDE 0.9 % (FLUSH) 0.9 %
10 SYRINGE (ML) INJECTION PRN
Status: DISCONTINUED | OUTPATIENT
Start: 2023-02-11 | End: 2023-03-07 | Stop reason: HOSPADM

## 2023-02-11 RX ORDER — AMLODIPINE BESYLATE 10 MG/1
10 TABLET ORAL DAILY
Status: DISCONTINUED | OUTPATIENT
Start: 2023-02-12 | End: 2023-02-12

## 2023-02-11 RX ORDER — SODIUM CHLORIDE 9 MG/ML
INJECTION, SOLUTION INTRAVENOUS PRN
Status: DISCONTINUED | OUTPATIENT
Start: 2023-02-11 | End: 2023-03-07 | Stop reason: HOSPADM

## 2023-02-11 RX ORDER — MIDAZOLAM HYDROCHLORIDE 1 MG/ML
INJECTION INTRAMUSCULAR; INTRAVENOUS
Status: COMPLETED
Start: 2023-02-11 | End: 2023-02-11

## 2023-02-11 RX ORDER — ALBUMIN (HUMAN) 12.5 G/50ML
12.5 SOLUTION INTRAVENOUS ONCE
Status: CANCELLED | OUTPATIENT
Start: 2023-02-11 | End: 2023-02-11

## 2023-02-11 RX ORDER — HEPARIN SODIUM 1000 [USP'U]/ML
1000 INJECTION, SOLUTION INTRAVENOUS; SUBCUTANEOUS ONCE
Status: COMPLETED | OUTPATIENT
Start: 2023-02-11 | End: 2023-02-11

## 2023-02-11 RX ORDER — METOLAZONE 5 MG/1
2.5 TABLET ORAL 2 TIMES DAILY
Status: DISCONTINUED | OUTPATIENT
Start: 2023-02-11 | End: 2023-02-12

## 2023-02-11 RX ORDER — GABAPENTIN 300 MG/1
300 CAPSULE ORAL 2 TIMES DAILY
Status: DISCONTINUED | OUTPATIENT
Start: 2023-02-11 | End: 2023-02-12

## 2023-02-11 RX ORDER — ROCURONIUM BROMIDE 10 MG/ML
100 INJECTION, SOLUTION INTRAVENOUS ONCE
Status: COMPLETED | OUTPATIENT
Start: 2023-02-11 | End: 2023-02-11

## 2023-02-11 RX ORDER — NOREPINEPHRINE BIT/0.9 % NACL 16MG/250ML
INFUSION BOTTLE (ML) INTRAVENOUS
Status: DISCONTINUED
Start: 2023-02-11 | End: 2023-02-12

## 2023-02-11 RX ORDER — SODIUM CHLORIDE 0.9 % (FLUSH) 0.9 %
5-40 SYRINGE (ML) INJECTION EVERY 12 HOURS SCHEDULED
Status: DISCONTINUED | OUTPATIENT
Start: 2023-02-11 | End: 2023-03-07 | Stop reason: HOSPADM

## 2023-02-11 RX ADMIN — PROPOFOL 10 MCG/KG/MIN: 10 INJECTION, EMULSION INTRAVENOUS at 18:36

## 2023-02-11 RX ADMIN — HEPARIN SODIUM 1000 UNITS: 1000 INJECTION, SOLUTION INTRAVENOUS; SUBCUTANEOUS at 21:50

## 2023-02-11 RX ADMIN — ALBUMIN (HUMAN) 12.5 G: 0.25 INJECTION, SOLUTION INTRAVENOUS at 21:54

## 2023-02-11 RX ADMIN — ETOMIDATE 20 MG: 2 INJECTION, SOLUTION INTRAVENOUS at 18:39

## 2023-02-11 RX ADMIN — CEFTRIAXONE SODIUM 1000 MG: 1 INJECTION, POWDER, FOR SOLUTION INTRAMUSCULAR; INTRAVENOUS at 16:38

## 2023-02-11 RX ADMIN — CHLORHEXIDINE GLUCONATE 0.12% ORAL RINSE 15 ML: 1.2 LIQUID ORAL at 22:32

## 2023-02-11 RX ADMIN — SODIUM ZIRCONIUM CYCLOSILICATE 10 G: 10 POWDER, FOR SUSPENSION ORAL at 22:32

## 2023-02-11 RX ADMIN — MIDAZOLAM 2 MG: 1 INJECTION INTRAMUSCULAR; INTRAVENOUS at 18:40

## 2023-02-11 RX ADMIN — CALCIUM GLUCONATE 1000 MG: 98 INJECTION, SOLUTION INTRAVENOUS at 11:57

## 2023-02-11 RX ADMIN — DEXTROSE MONOHYDRATE 250 ML: 100 INJECTION, SOLUTION INTRAVENOUS at 12:06

## 2023-02-11 RX ADMIN — FUROSEMIDE 10 MG/HR: 10 INJECTION, SOLUTION INTRAMUSCULAR; INTRAVENOUS at 15:17

## 2023-02-11 RX ADMIN — MIDAZOLAM HYDROCHLORIDE 2 MG: 2 INJECTION, SOLUTION INTRAMUSCULAR; INTRAVENOUS at 18:40

## 2023-02-11 RX ADMIN — INSULIN HUMAN 10 UNITS: 100 INJECTION, SOLUTION PARENTERAL at 11:58

## 2023-02-11 RX ADMIN — GABAPENTIN 300 MG: 300 CAPSULE ORAL at 22:32

## 2023-02-11 RX ADMIN — AZITHROMYCIN MONOHYDRATE 500 MG: 500 INJECTION, POWDER, LYOPHILIZED, FOR SOLUTION INTRAVENOUS at 17:43

## 2023-02-11 RX ADMIN — ALBUTEROL SULFATE 10 MG: 2.5 SOLUTION RESPIRATORY (INHALATION) at 12:53

## 2023-02-11 RX ADMIN — ROCURONIUM BROMIDE 100 MG: 100 INJECTION INTRAVENOUS at 18:39

## 2023-02-11 RX ADMIN — SODIUM BICARBONATE 50 MEQ: 84 INJECTION, SOLUTION INTRAVENOUS at 11:56

## 2023-02-11 RX ADMIN — IPRATROPIUM BROMIDE AND ALBUTEROL SULFATE 1 AMPULE: 2.5; .5 SOLUTION RESPIRATORY (INHALATION) at 20:14

## 2023-02-11 RX ADMIN — SODIUM CHLORIDE, PRESERVATIVE FREE 10 ML: 5 INJECTION INTRAVENOUS at 22:40

## 2023-02-11 RX ADMIN — FUROSEMIDE 40 MG: 10 INJECTION, SOLUTION INTRAVENOUS at 11:55

## 2023-02-11 ASSESSMENT — PULMONARY FUNCTION TESTS
PIF_VALUE: 19
PIF_VALUE: 20
PIF_VALUE: 19
PIF_VALUE: 20
PIF_VALUE: 23
PIF_VALUE: 17
PIF_VALUE: 40
PIF_VALUE: 24
PIF_VALUE: 34
PIF_VALUE: 19
PIF_VALUE: 20
PIF_VALUE: 19
PIF_VALUE: 20
PIF_VALUE: 22
PIF_VALUE: 34
PIF_VALUE: 20
PIF_VALUE: 26
PIF_VALUE: 36
PIF_VALUE: 20
PIF_VALUE: 20
PIF_VALUE: 18
PIF_VALUE: 21

## 2023-02-11 ASSESSMENT — PAIN - FUNCTIONAL ASSESSMENT: PAIN_FUNCTIONAL_ASSESSMENT: NONE - DENIES PAIN

## 2023-02-11 NOTE — ED NOTES
1157 paged Dr Lali Ayers  02/11/23 1159      1157 Dr Elisa Vargas returned call      Sammi Johnson  02/11/23 1153

## 2023-02-11 NOTE — CONSULTS
Nephrology Service Consultation    Patient:  Carlton Negron  MRN: [id number]  Consulting physician:  No att. providers found  Reason for Consult: Acute renal failure on CKD with hyperkalemia    History Obtained From:  patient, family member -son and daughter, electronic medical record  PCP: Fernanda Mccoy    HISTORY OF PRESENT ILLNESS:   The patient is a 67 y.o. male who presents with weakness shortness of breath and increased lethargy apparently has underlying wounds on lower extremities goes to wound care center was having increased pain was started on Percocet as well as already on Neurontin. Patient became more lethargic difficulty in his breathing family got concerned and brought to the hospital.  Patient is arousable but PCO2 is elevated but started on BiPAP in the emergency room and noted to have hyperkalemia as well with worsening renal function. I discussed with patient's son and daughter as well as patient the daughter wants the patient to be a full code all aggressive measures done and is aware dialysis is a possibility as patient does see my partner for underlying kidney disease. With above patient has evidence of fluid overload increased congestion we will start on the Lasix drip and give Lokelma for hyperkalemia. I discussed with the family that if the potassium does not improve will need dialysis later today. Patient in that setting will be admitted to the ICU and I discussed with the ICU doctor as well. Patient essentially is bedbound at home and is being taken care of by his family. Both lower extremities are wrapped in dressing has had recurrent surgery and debridement done in the past for recurrent infection. Has underlying stage III kidney disease cardiorenal syndrome type I. Underlying type 2 diabetes hypertension coronary artery disease. Last EF about 45%. In the setting of diabetes. And his ICD placement as well.   Also per chart patient was on Bactrim as well which can lead to high potassium as well with worsening renal function    Past Medical History:        Diagnosis Date    Acid reflux     Acute MI ([address] 2004, 2008    Arthritis     Back    Broken teeth     Upper Front    CAD (coronary artery disease)     Sees Dr. Hemanth Salgado [facility])     per old chart    Cerebral artery occlusion with cerebral infarction ([address]     CHF (congestive heart failure) (HCC)     Chronic back pain     Chronic kidney disease     STAGE 3 KIDNEY FAILURE- \"from my diabetes- do not follow with any one- have seen Dr Freddy Ellison in the past\"    Diabetes mellitus [facility]) Dx 1965    per old chart pt has been diabetic since age 13    Diabetic neuropathy ([address]     \"in my feet\"    H/O cardiovascular stress test 08/25/2016    H/O Doppler ultrasound 09/27/2018    Moderate disease of the right lower extremity with an JALEN of 0.72. Moderate to severe disease of the left lower extremity with an JALEN of 0.55.     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    Neuropathy     both feet    neuropathy     PAD (peripheral artery disease) ([address] 09/27/2018    PVD (peripheral vascular disease) ([address]     Sick sinus syndrome ([address]     Sleep apnea     \"sleep study 3 yrs ago- could not tolerate the cpap made me too dry\"    Spinal stenosis     Teeth missing     Upper And Lower    Type 2 diabetes mellitus without complication ([address]     WD-Chronic foot ulcer, left, with necrosis of bone ([address] 11/12/2021       Past Surgical History:        Procedure Laterality Date    CARDIAC CATHETERIZATION      per old chart done 10/2014    CARDIAC CATHETERIZATION 07/14/2017    with angiography of leg    CARDIAC CATHETERIZATION  11/20/2018    PATENT STENTS OF ALL THREE MAJOR VESSELS    CARDIAC DEFIBRILLATOR PLACEMENT  06/04/2010    [employer] [address] DR Defibrillator Implanted    COLECTOMY Right 08/26/2016    laparascopic; robotic assisted    COLONOSCOPY  08/04/2016    CORONARY ANGIOPLASTY      \"15 Heart Stents\"    CORONARY ANGIOPLASTY WITH STENT PLACEMENT      per old chart had angio with stent to circumflex and obtuse marginal artery at [facility] 5/2010( old chart also gives hx of stent placement done 2000,2004 and 2005)    DENTAL SURGERY      Teeth Extracted In Past    IR TUNNELED CATHETER PLACEMENT GREATER THAN 5 YEARS  6/14/2021    IR TUNNELED CATHETER PLACEMENT GREATER THAN 5 YEARS 6/14/2021 [facility]    IR TUNNELED CATHETER PLACEMENT GREATER THAN 5 YEARS  11/17/2022    IR TUNNELED CATHETER PLACEMENT GREATER THAN 5 YEARS 11/17/2022 [facility] SPECIAL PROCEDURES    PACEMAKER PLACEMENT  06/04/2010    [employer]tronic Sec[address] DR Defibrillator Implanted    TOE AMPUTATION Right 09/12/2017    Rt 3rd toe    TOE AMPUTATION Right 01/09/2018     Right 5th toe amputation and Toenails trimmed left 2,3,4 and 5th toes    TOE AMPUTATION Left 12/26/2020    LEFT GREAT TOE AMPUTATION performed by Selene Muñiz MD at [facility] [address] Left 7/26/2022    LEFT SECOND TOE AMPUTATION performed by Maty Bernal MD at [facility] Right 10/20/2022    Right First TOE AMPUTATION performed by Maty Bernal MD at [address] [place] [address]      per old chart had balloon angioplasty right superfical femoral artery,right popliteal artery,,right ant.tibial artery, right tibioperoneal trunk, and right post.tibial artery wna stent placement right popliteal artery and superfical femoral artery 7/2012       Medications:   Scheduled Meds:   sodium zirconium cyclosilicate  10 g Oral Once    sodium zirconium cyclosilicate  10 g Oral TID     Continuous Infusions:   dextrose      furosemide (LASIX) 1mg/mL infusion       PRN Meds:.glucose, dextrose bolus **OR** dextrose bolus, glucagon (rDNA), dextrose    Allergies:  Pcn [penicillins] and Fentanyl    Social History:   TOBACCO:   reports that he has been smoking cigars. He has never used smokeless tobacco.  ETOH:   reports no history of alcohol use. OCCUPATION:      Family History:       Problem Relation Age of Onset    Diabetes Mother     Stroke Mother     High Blood Pressure Mother     Vision Loss Mother     Cancer Father         Prostate Cancer    Diabetes Sister     Neuropathy Sister     Other Sister         \"Breathing Problems\"    Heart Disease Sister     Early Death Sister 62        Heart Complications    Cancer Brother         \"Stomach Cancer\"    High Blood Pressure Brother     Diabetes Brother     Heart Disease Brother     High Blood Pressure Brother     Cancer Son         \"Testicle Cancer\"       REVIEW OF SYSTEMS:  Negative except for weak tired short of breath dyspnea on exertion somewhat confused. Physical Exam:    I/O: No intake/output data recorded. Vitals: /69   Pulse 60   Temp 97.7 °F (36.5 °C) (Axillary)   Resp 14   Ht 6' (1.829 m)   Wt 260 lb (117.9 kg)   SpO2 97%   BMI 35.26 kg/m²   General appearance: Arousable but easily sleepy  HEENT: Head: Normal, normocephalic, atraumatic.   Neck: supple, symmetrical, trachea midline  Lungs: diminished breath sounds bilaterally bilateral rhonchi  Heart: S1, S2 normal  Abdomen: abnormal findings:  soft NT obese  Extremities: edema positive with wraps on both lower extremities  Neurologic: Mental status: alertness: Arousable weak on BiPAP      CBC:   Recent Labs     02/11/23  1047   WBC 4.6   HGB 10.3*        BMP:    Recent Labs     02/11/23  1047      K 6.5*      CO2 16*   BUN 71*   CREATININE 4.8*   GLUCOSE 170*     Hepatic:   Recent Labs     02/11/23  1047   AST 19   ALT 12   BILITOT 0.5   ALKPHOS 101     Troponin: No results for input(s): TROPONINI in the last 72 hours. BNP: No results for input(s): BNP in the last 72 hours. Lipids: No results for input(s): CHOL, HDL in the last 72 hours. Invalid input(s): LDLCALCU  ABGs: No results found for: PHART, PO2ART, RUB6CHO  INR: No results for input(s): INR in the last 72 hours.   Renal Labs  Albumin:    Lab Results   Component Value Date/Time    LABALBU 3.3 02/11/2023 10:47 AM     Calcium:    Lab Results   Component Value Date/Time    CALCIUM 8.2 02/11/2023 10:47 AM     Phosphorus:    Lab Results   Component Value Date/Time    PHOS 3.7 11/28/2022 10:45 AM     U/A:    Lab Results   Component Value Date/Time    NITRU NEGATIVE 11/11/2022 02:45 PM    COLORU YELLOW 11/11/2022 02:45 PM    PHUR 5.0 08/19/2016 09:38 AM    WBCUA <1 11/11/2022 02:45 PM    RBCUA NONE SEEN 11/11/2022 02:45 PM    MUCUS RARE 11/11/2022 02:45 PM    TRICHOMONAS NONE SEEN 11/11/2022 02:45 PM    BACTERIA NEGATIVE 11/11/2022 02:45 PM    CLARITYU CLEAR 11/11/2022 02:45 PM    SPECGRAV 1.020 11/11/2022 02:45 PM    UROBILINOGEN 0.2 11/11/2022 02:45 PM    BILIRUBINUR NEGATIVE 11/11/2022 02:45 PM    BLOODU NEGATIVE 11/11/2022 02:45 PM    KETUA NEGATIVE 11/11/2022 02:45 PM     ABG:  No results found for: PHART, HUH0XLN, PO2ART, UCT0LQZ, BEART, THGBART, RZQ3NMF, F7HORDJG  HgBA1c:    Lab Results   Component Value Date/Time    LABA1C 7.1 10/12/2022 06:28 AM     Microalbumen/Creatinine ratio:  No components found for: RUCREAT  TSH:  No results found for: TSH  IRON:    Lab Results   Component Value Date/Time    IRON 38 10/23/2022 05:53 PM     Iron Saturation:  No components found for: PERCENTFE  TIBC:    Lab Results   Component Value Date/Time    TIBC 217 10/23/2022 05:53 PM     FERRITIN:    Lab Results   Component Value Date/Time    FERRITIN 66 10/23/2022 05:53 PM     RPR:  No results found for: RPR  TIGIST:  No results found for: ANATITER, TIGIST  24 Hour Urine for Creatinine Clearance:  No components found for: CREAT4, UHRS10, UTV10  -----------------------------------------------------------------      Assessment and Recommendations     Patient Active Problem List   Diagnosis Code    PAD (peripheral artery disease) ([facility]) I73.9    Chronic coronary artery disease I25.10    Biventricular ICD (implantable cardioverter-defibrillator) in place Z95.810    Chronic combined systolic and diastolic heart failure ([facility]) I50.42    Chronic kidney disease, stage III (moderate) ([facility]) N18.30    Mixed hyperlipidemia E78.2    Sick sinus syndrome ([facility]) I49.5    Type 2 diabetes mellitus with diabetic polyneuropathy ([facility]) E11.42    Spinal stenosis of lumbar region M48.061    Obesity, Class I, BMI 30-34.9 E66.9    S/P partial colectomy Z90.49    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    Microalbuminuria R80.9    WD-PVD (peripheral vascular disease) ([facility]) I73.9    Limb ischemia I99.8    Necrotic toes ([facility]) I96    Toe gangrene ([facility]) I96    Diabetic foot infection ([facility]) E11.628, L08.9    Chronic kidney disease (CKD) stage G3a/A2, moderately decreased glomerular filtration rate (GFR) between 45-59 mL/min/1.73 square meter and albuminuria creatinine ratio between  mg/g ([facility]) N18.31    Edema R60.9    ICD (implantable cardioverter-defibrillator) battery depletion Z45.02    Hyperkalemia E87.5    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[facility]) I96    Ischemia of toe I99.8    Acute kidney injury ([facility] N17.9    Fluid overload E87.70    DM (diabetes mellitus) ([facility]) E11.9    Precordial pain R07.2    Acute chest pain R07.9    Unstable angina ([facility]) I20.0    Chronic kidney disease (CKD) stage G3a/A3, moderately decreased glomerular filtration rate (GFR) between 45-59 mL/min/1.73 square meter and albuminuria creatinine ratio greater than 300 mg/g ([facility]) N18.31    Cardiomyopathy ([facility]) I42.9    Diabetic neuropathy ([facility]) E11.40    HTN (hypertension) I10    Epigastric pain R10.13    Acute on chronic congestive heart failure (HCC) I50.9    Leg edema R60.0    Acute on chronic systolic CHF (congestive heart failure) ([facility]) I50.23    Diabetic foot ulcer with osteomyelitis ([address] E11.621, E11.69, L97.509, M86.9    Visit for wound check Z51.89    Moderate malnutrition ([address] E44.0    Long term (current) use of antibiotics Z79.2    WD-Diabetic ulcer of toe of right foot associated with type 2 diabetes mellitus, with fat layer exposed ([address] E11.621, L97.512    Diabetic ulcer of toe associated with type 2 diabetes mellitus, with bone involvement without evidence of necrosis ([address] E11.621, L97.506    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 B87.9    Persistent wound pain R52    Receiving intravenous antibiotic treatment as outpatient Z79.2    Acute on chronic respiratory failure with hypoxemia ([address] J96.21    WD-Chronic foot ulcer, left, with necrosis of bone ([facility]) L97.524    Acute on chronic HFrEF (heart failure with reduced ejection fraction) ([facility]) I50.23    Scrotal edema N50.89    Hypertensive urgency I16.0    Diabetic ulcer of right foot due to type 2 diabetes mellitus ([facility]) E11.621, L97.519    Cellulitis of foot L03.119    Toe osteomyelitis ([facility]) M86.9    Heart failure exacerbated by sotalol ([facility]) I50.9    CHF (congestive heart failure), NYHA class I, acute on chronic, combined ([facility]) I50.43    Acute on chronic diastolic CHF (congestive heart failure) ([facility]) I50.33    Leg swelling M79.89    Acute on chronic diastolic heart failure ([facility]) I50.33    Skin ulcer of left foot including toes with fat layer exposed ([address] L97.522    Superficial incisional surgical site infection T81.41XA    Bacteremia due to Enterococcus R78.81, B95.2    Acute respiratory failure with hypoxia and hypercapnia ([facility]) J96.01, J96.02       Impression plan  #1 hypercarbic respiratory failure  #2 hyperkalemia  #3 acute renal failure on CKD 3  #4 recurrent cellulitis lower extremities with lymphedema  #5 change in mental status  #6 CHF with cardiorenal syndrome  #7 type 2 diabetes    Plan  #1 maintain on BiPAP monitor oxygenation supportive care try doing aggressive diuresis at the same time hopefully can avoid intubation  #2 with hyperkalemia would give Daniela Gillis started Lasix drip not felt could do bicarb drip at this time as need to remove volume and recheck potassium later today if potassium not get less than 6 may need to place a temporary line to do hemodialysis discussed with patient's family and patient and he is aware  #3 worsening renal function in the above setting treat his underlying cardiorenal syndrome maintain diuresis and possible role for dialysis if not improved possible patient was on Bactrim which could elevate potassium worsen renal function may offer that would help as well  #4 recommend wound care evaluation lower extremities and remove the dressing to make sure no evidence of infection there  #5 monitor affect is arousable but still somewhat lethargic which is slightly worse than baseline  #6 with chest x-ray findings and volume congestion will start Lasix drip and see if response  #7 monitor glucose with sliding scale    Admit to ICU supportive care recheck labs if worsening potassium family and patient agreed to temporary HD line and hemodialysis later today.   If potassium is less than 6 and good diuresis with Lasix we will hold off on dialysis for now  We will monitor and follow closely discussed with intensivist as well  We will place a Fuller catheter if family agrees    Electronically signed by Saúl Olivas MD on 2/11/2023 at 1:09 PM

## 2023-02-11 NOTE — ED PROVIDER NOTES
Emergency Department Encounter    Patient: Ileana Dancer  MRN: [id number]  : 1950  Date of Evaluation: 2023  ED Provider:  Harpreet Bermeo MD    Triage Chief Complaint:   Respiratory Distress    [place]:  Ileana Dancer is a 67 y.o. male that presents with complaint of respiratory distress. Was reportedly almost unresponsive for EMS. Does have a history of CKD, CHF, stroke, has bilateral wrappings on his legs. Had a recent hospitalization. Report was called for shortness of breath. His pulse ox is in the 70s for them, they put him on BiPAP and he is improving. He is actually answering questions for me, states he has been short of breath for some time, gradually worsening over weeks. No fevers. No cough. He is not on oxygen at baseline. No known history of COPD. He does have a history of diabetes, glucose was normal for EMS. He denies any vomiting and diarrhea except for vomiting this morning when he was so short of breath x1. ROS - see HPI, below listed is current ROS at time of my eval:  10 Systems reviewed and negative except as above. Past Medical History:   Diagnosis Date    Acid reflux     Acute MI ([address] ,     Arthritis     Back    Broken teeth     Upper Front    CAD (coronary artery disease)     Sees Dr. Rosenthal [facility])     per old chart    Cerebral artery occlusion with cerebral infarction ([address]     CHF (congestive heart failure) (HCC)     Chronic back pain     Chronic kidney disease     STAGE 3 KIDNEY FAILURE- \"from my diabetes- do not follow with any one- have seen Dr Sky Escamilla in the past\"    Diabetes mellitus [facility]) Dx 1965    per old chart pt has been diabetic since age 13    Diabetic neuropathy ([facility]     \"in my feet\"    H/O cardiovascular stress test 2016    H/O Doppler ultrasound 2018    Moderate disease of the right lower extremity with an JALEN of 0.72. Moderate to severe disease of the left lower extremity with an JALEN of 0.55.     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    Neuropathy     both feet    neuropathy     PAD (peripheral artery disease) ([address] 09/27/2018    PVD (peripheral vascular disease) ([address]     Sick sinus syndrome (HCC)     Sleep apnea     \"sleep study 3 yrs ago- could not tolerate the cpap made me too dry\"    Spinal stenosis     Teeth missing     Upper And Lower    Type 2 diabetes mellitus without complication ([address]     WD-Chronic foot ulcer, left, with necrosis of bone ([address] 11/12/2021     Past Surgical History:   Procedure Laterality Date    CARDIAC CATHETERIZATION      per old chart done 10/2014    CARDIAC CATHETERIZATION  07/14/2017    with angiography of leg    CARDIAC CATHETERIZATION  11/20/2018    PATENT STENTS OF ALL THREE MAJOR VESSELS    CARDIAC DEFIBRILLATOR PLACEMENT  06/04/2010    [employer] [address] Defibrillator Implanted    COLECTOMY Right 08/26/2016    laparascopic; robotic assisted    COLONOSCOPY  08/04/2016    CORONARY ANGIOPLASTY      \"15 Heart Stents\"    CORONARY ANGIOPLASTY WITH STENT PLACEMENT      per old chart had angio with stent to circumflex and obtuse marginal artery at [facility] 5/2010( old chart also gives hx of stent placement done 2000,2004 and 2005)    DENTAL SURGERY      Teeth Extracted In Past    IR TUNNELED CATHETER PLACEMENT GREATER THAN 5 YEARS  6/14/2021    IR TUNNELED CATHETER PLACEMENT GREATER THAN 5 YEARS 6/14/2021 [facility]    IR TUNNELED CATHETER PLACEMENT GREATER THAN 5 YEARS  11/17/2022    IR TUNNELED CATHETER PLACEMENT GREATER THAN 5 YEARS [facility]    PACEMAKER PLACEMENT  06/04/2010 [employer] [address] Defibrillator Implanted    TOE AMPUTATION Right 09/12/2017    Rt 3rd toe    TOE AMPUTATION Right 01/09/2018     Right 5th toe amputation and Toenails trimmed left 2,3,4 and 5th toes    TOE AMPUTATION Left 12/26/2020    LEFT GREAT TOE AMPUTATION performed by Kris Bach MD at [address] Left 7/26/2022    LEFT SECOND TOE AMPUTATION performed by Bharath Weber MD at [address] Right 10/20/2022    Right First TOE AMPUTATION performed by Bharath Weber MD at [address] [place] [address]      per old chart had balloon angioplasty right superfical femoral artery,right popliteal artery,,right ant.tibial artery, right tibioperoneal trunk, and right post.tibial artery wna stent placement right popliteal artery and superfical femoral artery 7/2012     Family History   Problem Relation Age of Onset    Diabetes Mother     Stroke Mother     High Blood Pressure Mother     Vision Loss Mother     Cancer Father         Prostate Cancer    Diabetes Sister     Neuropathy Sister     Other Sister         \"Breathing Problems\"    Heart Disease Sister     Early Death Sister 62        Heart Complications    Cancer Brother         \"Stomach Cancer\"    High Blood Pressure Brother     Diabetes Brother     Heart Disease Brother     High Blood Pressure Brother     Cancer Son         \"Testicle Cancer\"     Social History     Socioeconomic History    Marital status:       Spouse name: Not on file    Number of children: Not on file    Years of education: Not on file    Highest education level: Not on file   Occupational History    Not on file   Tobacco Use    Smoking status: Some Days     Types: Cigars    Smokeless tobacco: Never    Tobacco comments:     Client states he has stopped smoking   Vaping Use    Vaping Use: Never used   Substance and Sexual Activity    Alcohol use: No    Drug use: Yes     Types: Marijuana [place])    Sexual activity: Never   Other Topics Concern    Not on file Social History Narrative    Not on file     Social Determinants of Health     Financial Resource Strain: Not on file   Food Insecurity: Not on file   Transportation Needs: Not on file   Physical Activity: Not on file   Stress: Not on file   Social Connections: Not on file   Intimate Partner Violence: Not on file   Housing Stability: Not on file     Current Facility-Administered Medications   Medication Dose Route Frequency Provider Last Rate Last Admin    dextrose bolus 10% 250 mL  250 mL IntraVENous Once Antelmo De Paz .5 mL/hr at 02/11/23 1206 250 mL at 02/11/23 1206    glucose chewable tablet 16 g  4 tablet Oral PRN Antelmo De Paz MD        dextrose bolus 10% 125 mL  125 mL IntraVENous PRN Antelmo De Paz MD        Or    dextrose bolus 10% 250 mL  250 mL IntraVENous PRN Antelmo De Paz MD        glucagon (rDNA) injection 1 mg  1 mg SubCUTAneous PRN Antelmo De Paz MD        dextrose 10 % infusion   IntraVENous Continuous PRN Antelmo De Paz MD        albuterol (PROVENTIL) nebulizer solution 10 mg  10 mg Nebulization Once Antelmo De Paz MD        sodium zirconium cyclosilicate (LOKELMA) oral suspension 10 g  10 g Oral Once Antelmo De Paz MD         Current Outpatient Medications   Medication Sig Dispense Refill    oxyCODONE-acetaminophen (PERCOCET) 7.5-325 MG per tablet Take 1 tablet by mouth every 8 hours as needed for Pain for up to 7 days. Intended supply: 7 days Max Daily Amount: 3 tablets 21 tablet 0    sulfamethoxazole-trimethoprim (BACTRIM) 400-80 MG per tablet Take 1 tablet by mouth daily for 7 days 7 tablet 0    gentamicin (GARAMYCIN) 0.1 % cream Apply topically 3 times daily 60 g 3    torsemide (DEMADEX) 100 MG tablet TAKE 1 TABLET BY MOUTH TWICE A DAY IN THE MORNING AND IN THE EVENING 60 tablet 3    gabapentin (NEURONTIN) 300 MG capsule Take 300 mg by mouth in the morning and at bedtime.       atorvastatin (LIPITOR) 40 MG tablet Take 1 tablet by mouth nightly (Patient not taking: No sig reported) 30 tablet 3    carvedilol (COREG) 25 MG tablet Take 1 tablet by mouth 2 times daily 60 tablet 0    clopidogrel (PLAVIX) 75 MG tablet Take 1 tablet by mouth daily 30 tablet 1    amLODIPine (NORVASC) 10 MG tablet Take 1 tablet by mouth daily 30 tablet 1    empagliflozin (JARDIANCE) 10 MG tablet Take 1 tablet by mouth daily (Patient not taking: No sig reported) 30 tablet 1    metOLazone (ZAROXOLYN) 2.5 MG tablet Take 1 tablet by mouth in the morning and at bedtime 30 tablet 0    sodium hypochlorite (DAKINS) 0.125 % SOLN external solution Apply topically daily 1 each 0    aspirin 81 MG chewable tablet Take 1 tablet by mouth daily 30 tablet 3    isosorbide mononitrate (IMDUR) 30 MG extended release tablet Take 1 tablet by mouth daily (Patient not taking: No sig reported) 30 tablet 3    magnesium oxide (MAG-OX) 400 (240 Mg) MG tablet Take 1 tablet by mouth 2 times daily 30 tablet 0    SPIRIVA HANDIHALER 18 MCG inhalation capsule Inhale 18 mcg into the lungs daily (Patient not taking: No sig reported)      albuterol sulfate HFA (VENTOLIN HFA) 108 (90 Base) MCG/ACT inhaler Inhale 2 puffs into the lungs every 4 hours as needed for Wheezing 1 Inhaler 3     Allergies   Allergen Reactions    Pcn [Penicillins] Hives    Fentanyl Itching       Nursing Notes Reviewed    Physical Exam:  Triage VS:    ED Triage Vitals   Enc Vitals Group      BP 02/11/23 1045 125/73      Heart Rate 02/11/23 1045 60      Resp 02/11/23 1045 15      Temp --       Temp src --       SpO2 02/11/23 1045 100 %      Weight 02/11/23 1043 260 lb (117.9 kg)      Height 02/11/23 1043 6' (1.829 m)      Head Circumference --       Peak Flow --       Pain Score --       Pain Loc --       Pain Edu? --       Excl. in [address]? --        My pulse ox interpretation is - normal    General appearance:  severe respiratory distress  Skin:  Warm. Dry. Eye:  Extraocular movements intact. Ears, nose, mouth and throat:  Oral mucosa moist   Neck:  Trachea midline. Extremity:  significant edema in bilateral lower extremities, wraps in place. Heart:  Regular rate and rhythm, normal S1 & S2, no extra heart sounds. Perfusion:  intact  Respiratory: on bipap, tachypneic, intercostal muscle use, speaks short sentences, crackles throughout lung fields, no wheezing. Abdominal:  Soft. Nontender. Non distended. Neurological:  Alert and oriented, raises arms when asked, can pull mask off his face.             Psychiatric:  Appropriate    I have reviewed and interpreted all of the currently available lab results from this visit (if applicable):  Results for orders placed or performed during the hospital encounter of 02/11/23   COVID-19, Rapid    Specimen: Nasopharyngeal   Result Value Ref Range    Source UNKNOWN     SARS-CoV-2, NAAT NOT DETECTED NOT DETECTED   Rapid Flu Swab    Specimen: Nasopharyngeal   Result Value Ref Range    Rapid Influenza A Ag NEGATIVE NEGATIVE    Rapid Influenza B Ag NEGATIVE NEGATIVE   CBC with Auto Differential   Result Value Ref Range    WBC 4.6 4.0 - 10.5 K/CU MM    RBC 4.01 (L) 4.6 - 6.2 M/CU MM    Hemoglobin 10.3 (L) 13.5 - 18.0 GM/DL    Hematocrit 37.0 (L) 42 - 52 %    MCV 92.3 78 - 100 FL    MCH 25.7 (L) 27 - 31 PG    MCHC 27.8 (L) 32.0 - 36.0 %    RDW 18.4 (H) 11.7 - 14.9 %    Platelets 662 720 - 160 K/CU MM    MPV 10.6 7.5 - 11.1 FL    Differential Type AUTOMATED DIFFERENTIAL     Segs Relative 63.9 36 - 66 %    Lymphocytes % 25.4 24 - 44 %    Monocytes % 9.3 (H) 0 - 4 %    Eosinophils % 0.6 0 - 3 %    Basophils % 0.6 0 - 1 %    Segs Absolute 3.0 K/CU MM    Lymphocytes Absolute 1.2 K/CU MM    Monocytes Absolute 0.4 K/CU MM    Eosinophils Absolute 0.0 K/CU MM    Basophils Absolute 0.0 K/CU MM    Nucleated RBC % 1.3 %    Total Nucleated RBC 0.1 K/CU MM    Total Immature Neutrophil 0.01 K/CU MM    Immature Neutrophil % 0.2 0 - 0.43 %   Comprehensive Metabolic Panel   Result Value Ref Range    Sodium 137 135 - 145 MMOL/L    Potassium 6.5 (HH) 3.5 - 5. 1 MMOL/L    Chloride 108 99 - 110 mMol/L    CO2 16 (L) 21 - 32 MMOL/L    BUN 71 (H) 6 - 23 MG/DL    Creatinine 4.8 (H) 0.9 - 1.3 MG/DL    Est, Glom Filt Rate 12 (L) >60 mL/min/1.73m2    Glucose 170 (H) 70 - 99 MG/DL    Calcium 8.2 (L) 8.3 - 10.6 MG/DL    Albumin 3.3 (L) 3.4 - 5.0 GM/DL    Total Protein 7.5 6.4 - 8.2 GM/DL    Total Bilirubin 0.5 0.0 - 1.0 MG/DL    ALT 12 10 - 40 U/L    AST 19 15 - 37 IU/L    Alkaline Phosphatase 101 40 - 129 IU/L    Anion Gap 13 4 - 16   Troponin   Result Value Ref Range    Troponin T 0.107 (HH) <0.01 NG/ML   Brain Natriuretic Peptide   Result Value Ref Range    Pro-BNP 15,783 (H) <300 PG/ML   Blood Gas, Venous   Result Value Ref Range    pH, [place] 7.02 (L) 7.32 - 7.42    pCO2, [place] 63 (H) 38 - 52 mmHG    pO2, [place] 41 28 - 48 mmHG    Base Excess 15 (H) 0 - 3.3    HCO3, Venous 16.3 (L) 19 - 25 MMOL/L    O2 Sat, [place] 59.1 50 - 70 %    Comment VBG    POC Blood Glucose   Result Value Ref Range    QC OK? 214    POCT Glucose   Result Value Ref Range    POC Glucose 214 (H) 70 - 99 MG/DL      Radiographs (if obtained):  Radiologist's Report Reviewed:  XR CHEST PORTABLE    Result Date: 2/11/2023  EXAMINATION: ONE XRAY VIEW OF THE CHEST 2/11/2023 11:04 am COMPARISON: 11/11/2022 HISTORY: ORDERING SYSTEM PROVIDED HISTORY: SOB TECHNOLOGIST PROVIDED HISTORY: Reason for exam:->SOB Reason for Exam: SOB FINDINGS: Cardiomegaly, pulmonary vascular congestion/edema, consolidating infiltrative changes mid-basilar regions bilaterally worse on the right. Bibasilar pleural effusions suggested. Findings are consistent with congestive heart failure and/or bibasilar pneumonia. No pneumothorax. Findings consistent with congestive heart failure and/or bilateral mid-basilar pneumonia. Small bibasilar pleural effusions suggested. Findings are generally worse on the right.        MDM:  CC/HPI Summary, DDx, ED Course, and Reassessment: 68-year-old male with history as above presents with concern for respiratory distress. Was started on BiPAP by EMS because pulse ox was in the 70s, dipped just prior to their arrival but is improving on moving onto [place] and putting him on her BiPAP. Labs will be ordered. I did order COVID and flu for cohorting as he will require hospitalization, but does certainly sound like this is more caused by fluid overload, his legs are significantly edematous, he has crackles at bilateral bases, known history of CHF. No history of COPD and does not require oxygen at home    ED Course as of 02/11/23 1226   Sat Feb 11, 2023   1045 Patient able to answer questions, feeling much better on the bipap, able to lift both arms, follow commands, oriented, we are placing him on our bipap, will get blood gas, labs and CXR and reassess [KA]   1054 Patient is 7.02 with PCO2 of 63, bicarb of 16.3, looks like a mixed respiratory metabolic acidosis. He is improving significantly with the BiPAP, we will await other labs at this time. I am getting give him a dose of Lasix as he appears significantly fluid overloaded [KA]   1139 Chest x-ray consistent with pulmonary edema and pleural effusions, acute on chronic heart failure, white blood cell count is normal, I do not suspect pneumonia at this time, he has had no cough or fevers. He is obviously fluid overloaded on exam [KA]   1139 K 6.5- called to me by lab, does not appear hemolyzed. I will order hyperk orderset. [KA]   1142 Cr up to 4.8, placed nephrology consult. Called that trop is also up- likely related to renal injury and his heart failure, I will consult cards as well. [KA]   1156 Discussed with Dr. Segundo Goss, patient is not currently on dialysis but certainly may require it if unable to get fluid off, he agrees with plan for the hyperkalemia order set and he will come to evaluate the patient. [KA]   18 Spoke with Dr. Carlson More, he is familiar with patient, agrees with plan for admission, diuresis.   At this time suspect the troponin is related to his renal injury and fluid overload rather than an acute coronary syndrome, so would not start heparin and he agrees. [KA]   [phone] with Gallo Valencia NP, asked that a repeat blood gas be ordered for placement, I have ordered and let nurse know [KA]   0276 Hospitalist and Dr. Grijalva Come at bedside, likely will need 1:1 nursing at least for several hours, will consult ICU rather than step down at this time [KA]      ED Course User Index  Rex Elizabeth MD       History from : Patient and EMS    Limitations to history : respiratory distress    Patient was given the following medications:  Medications   insulin regular (HUMULIN R;NOVOLIN R) injection 10 Units (10 Units IntraVENous Given 2/11/23 1158)     And   dextrose bolus 10% 250 mL (250 mLs IntraVENous New Bag 2/11/23 1206)   glucose chewable tablet 16 g (has no administration in time range)   dextrose bolus 10% 125 mL (has no administration in time range)     Or   dextrose bolus 10% 250 mL (has no administration in time range)   glucagon (rDNA) injection 1 mg (has no administration in time range)   dextrose 10 % infusion (has no administration in time range)   albuterol (PROVENTIL) nebulizer solution 10 mg (has no administration in time range)   sodium zirconium cyclosilicate (LOKELMA) oral suspension 10 g (10 g Oral Not Given 2/11/23 1154)   calcium gluconate 10 % injection 1,000 mg (1,000 mg IntraVENous Given 2/11/23 1157)   sodium bicarbonate 8.4 % injection 50 mEq (50 mEq IntraVENous Given 2/11/23 1156)   furosemide (LASIX) injection 40 mg (40 mg IntraVENous Given 2/11/23 1155)       EKG (if obtained): (All EKG's are interpreted by myself in the absence of a cardiologist) atrial paced rhythm, prolonged AV conduction, rate of 75 bpm.  No ST elevation.   incomplete right bundle branch block    Chronic conditions affecting care: CHF, CKD    Discussion with Other Profesionals : Admitting Team   and Consultant as above    Social Determinants : None    Records Reviewed : Inpatient Notes discharge summary from November 2022 by Dr. Lillian Shabazz, he was admitted with acute on chronic heart failure at that time, was no longer requiring oxygen at discharge, so that is baseline that he is not typically on oxygen. He did require cardiology and nephrology evaluations, significant renal disease and looks like he had not followed up with nephrology regarding that. He also had a diabetic foot infection with right great toe ischemia, he has been continuing to see wound clinic for that, had been on meropenem for 6 weeks after that discharge. Reviewed IR procedure from 1/11/2023, they had removed his tunneled PICC at that time after he had completed his IV antibiotics    Disposition Considerations (tests considered but not done, Shared Decision Making, Pt Expectation of Test or Tx.):   admission    I am the Primary Clinician of Record. Total critical care time today provided was 39 minutes. This excludes seperately billable procedure. Critical care time provided for respiroatry failure, NANCY on CKD that required close evaluation and/or intervention with concern for patient decompensation. Clinical Impression:  1. Acute respiratory failure with hypoxia and hypercapnia (HCC)    2. Acute kidney injury superimposed on chronic kidney disease ([facility]    3. Hyperkalemia    4. Acute on chronic heart failure, unspecified heart failure type (HCC)    5. Elevated troponin    6. Acidosis, metabolic, with respiratory acidosis      Disposition referral (if applicable):  No follow-up provider specified.   Disposition medications (if applicable):  New Prescriptions    No medications on file     ED Provider Disposition Time  DISPOSITION Decision To Admit 02/11/2023 12:05:38 PM      Comment: Please note this report has been produced using speech recognition software and may contain errors related to that system including errors in grammar, punctuation, and spelling, as well as words and phrases that may be inappropriate. Efforts were made to edit the dictations.         Matilde White MD  02/11/23 2143

## 2023-02-11 NOTE — PROGRESS NOTES
02/11/23 1100   NIV Type   $NIV $Daily Charge   Suction Setup and Functional Yes   NIV Started/Stopped On   Equipment Type v60   Mode Bilevel   Mask Type Full face mask   Mask Size Large   Settings/Measurements   PIP Observed 20 cm H20   IPAP 15 cmH20   CPAP/EPAP 5 cmH2O   Vt (Measured) 548 mL   Rate Ordered 12   Resp 13   Insp Rise Time (%) 2 %   FiO2  40 %   I Time/ I Time % 1 s   Minute Volume (L/min) 8.4 Liters   Mask Leak (lpm) 8 lpm   Comfort Level Good   Using Accessory Muscles No   SpO2 97   Patient's Home Machine No   Alarm Settings   Alarms On Y   Low Pressure (cmH2O) 3 cmH2O   High Pressure (cmH2O) 25 cmH2O   Delay Alarm 20 sec(s)   Apnea (secs) 20 secs   RR Low (bpm) 13   RR High (bpm) 40 br/min   Breath Sounds   Right Upper Lobe Diminished   Right Middle Lobe Diminished   Right Lower Lobe Crackles   Left Upper Lobe Diminished   Left Lower Lobe Crackles   Patient appropriate for BIPAP . Patient tolerating well at this time. Will wean as tolerated.

## 2023-02-11 NOTE — PROGRESS NOTES
Skin assessment done by this nurse and Summer Wagoner RN. Skin shearing on buttocks, redness in groin, Multiple open ulcers BLE and asa amputations toes. See wound assessment.   Wound care order placed

## 2023-02-11 NOTE — CONSULTS
Chart rev  Full note to follow                      Name:  Annette Retana /Age/Sex: 1950  (67 y.o. male)   MRN & CSN:  [id number] & [id number] Admission Date/Time: 2023 10:42 AM   Location:  TR02/02TR-02 PCP: Daniel Andrew Day: 1          Referring physician:  Elida Castillo MD         Reason for consultation: Congestive heart failure        Thanks for referral.    Information source: Patient's son who is on his bedside/chart    CC; shortness of breath      HPI:   Thank you for involving me in taking  care of Annette Retana who  is a 67 y. o.year  Old male  Presents with history of COPD, CAD, CKD, severe peripheral vascular disease now here with the respiratory failure was in respiratory distress with sats in 70s      , at present patient is on BiPAP              Past medical history:    has a past medical history of Acid reflux, Acute MI ([address] Arthritis, Broken teeth, CAD (coronary artery disease), Cardiomyopathy ([address] Cerebral artery occlusion with cerebral infarction ([address] CHF (congestive heart failure) ([address] Chronic back pain, Chronic kidney disease, Diabetes mellitus ([address] Diabetic neuropathy ([address] H/O cardiovascular stress test, H/O Doppler ultrasound, H/O percutaneous left heart catheterization, History of irregular heartbeat, History of syncope, Hyperlipidemia, Hypertension, Leg swelling, Necrotic toes (HCC), Neuropathy, neuropathy, PAD (peripheral artery disease) ([address] PVD (peripheral vascular disease) ([address] Sick sinus syndrome ([address] Sleep apnea, Spinal stenosis, Teeth missing, Type 2 diabetes mellitus without complication ([address] and WD-Chronic foot ulcer, left, with necrosis of bone ([address] Past surgical history:   has a past surgical history that includes Coronary angioplasty with stent; Dental surgery; Colonoscopy (2016); pacemaker placement (2010); vascular surgery; colectomy (Right, 2016); Toe amputation (Right, 2017);  Toe amputation (Right, 01/09/2018); Cardiac catheterization; Cardiac defibrillator placement (06/04/2010); Coronary angioplasty; Cardiac catheterization (07/14/2017); Cardiac catheterization (11/20/2018); Toe amputation (Left, 12/26/2020); IR TUNNELED CVC PLACE WO SQ PORT/PUMP > 5 YEARS (6/14/2021); Toe amputation (Left, 7/26/2022); Toe amputation (Right, 10/20/2022); and IR TUNNELED CVC PLACE WO SQ PORT/PUMP > 5 YEARS (11/17/2022). Social History:   reports that he has been smoking cigars. He has never used smokeless tobacco. He reports current drug use. Drug: Marijuana Valdene Kasper). He reports that he does not drink alcohol. Family history:  family history includes Cancer in his brother, father, and son; Diabetes in his brother, mother, and sister; Early Death (age of onset: 62) in his sister; Heart Disease in his brother and sister; High Blood Pressure in his brother, brother, and mother; Neuropathy in his sister; Other in his sister; Stroke in his mother; Vision Loss in his mother.     Allergies   Allergen Reactions    Pcn [Penicillins] Hives    Fentanyl Itching       glucose chewable tablet 16 g, PRN  dextrose bolus 10% 125 mL, PRN   Or  dextrose bolus 10% 250 mL, PRN  glucagon (rDNA) injection 1 mg, PRN  dextrose 10 % infusion, Continuous PRN  sodium zirconium cyclosilicate (LOKELMA) oral suspension 10 g, Once  furosemide (LASIX) 100 mg in sodium chloride 0.9 % 100 mL infusion, Continuous  sodium zirconium cyclosilicate (LOKELMA) oral suspension 10 g, TID    Current Facility-Administered Medications   Medication Dose Route Frequency Provider Last Rate Last Admin    glucose chewable tablet 16 g  4 tablet Oral PRN Arleen Marr MD        dextrose bolus 10% 125 mL  125 mL IntraVENous PRN Arleen Marr MD        Or    dextrose bolus 10% 250 mL  250 mL IntraVENous PRN Arleen Marr MD        glucagon (rDNA) injection 1 mg  1 mg SubCUTAneous PRN Arleen Marr MD        dextrose 10 % infusion   IntraVENous Continuous PRN Mayo Sandoval Chiquis Zepeda MD        sodium zirconium cyclosilicate (LOKELMA) oral suspension 10 g  10 g Oral Once Leonardo MD Zach        furosemide (LASIX) 100 mg in sodium chloride 0.9 % 100 mL infusion  10 mg/hr IntraVENous Continuous Madhu Capps MD        sodium zirconium cyclosilicate (LOKELMA) oral suspension 10 g  10 g Oral TID Rafiq Dodson MD         Current Outpatient Medications   Medication Sig Dispense Refill    oxyCODONE-acetaminophen (PERCOCET) 7.5-325 MG per tablet Take 1 tablet by mouth every 8 hours as needed for Pain for up to 7 days. Intended supply: 7 days Max Daily Amount: 3 tablets 21 tablet 0    sulfamethoxazole-trimethoprim (BACTRIM) 400-80 MG per tablet Take 1 tablet by mouth daily for 7 days 7 tablet 0    gentamicin (GARAMYCIN) 0.1 % cream Apply topically 3 times daily 60 g 3    torsemide (DEMADEX) 100 MG tablet TAKE 1 TABLET BY MOUTH TWICE A DAY IN THE MORNING AND IN THE EVENING 60 tablet 3    gabapentin (NEURONTIN) 300 MG capsule Take 300 mg by mouth in the morning and at bedtime.       atorvastatin (LIPITOR) 40 MG tablet Take 1 tablet by mouth nightly (Patient not taking: No sig reported) 30 tablet 3    carvedilol (COREG) 25 MG tablet Take 1 tablet by mouth 2 times daily 60 tablet 0    clopidogrel (PLAVIX) 75 MG tablet Take 1 tablet by mouth daily 30 tablet 1    amLODIPine (NORVASC) 10 MG tablet Take 1 tablet by mouth daily 30 tablet 1    empagliflozin (JARDIANCE) 10 MG tablet Take 1 tablet by mouth daily (Patient not taking: No sig reported) 30 tablet 1    metOLazone (ZAROXOLYN) 2.5 MG tablet Take 1 tablet by mouth in the morning and at bedtime 30 tablet 0    sodium hypochlorite (DAKINS) 0.125 % SOLN external solution Apply topically daily 1 each 0    aspirin 81 MG chewable tablet Take 1 tablet by mouth daily 30 tablet 3    isosorbide mononitrate (IMDUR) 30 MG extended release tablet Take 1 tablet by mouth daily (Patient not taking: No sig reported) 30 tablet 3    magnesium oxide (MAG-OX) 400 (240 Mg) MG tablet Take 1 tablet by mouth 2 times daily 30 tablet 0    SPIRIVA HANDIHALER 18 MCG inhalation capsule Inhale 18 mcg into the lungs daily (Patient not taking: No sig reported)      albuterol sulfate HFA (VENTOLIN HFA) 108 (90 Base) MCG/ACT inhaler Inhale 2 puffs into the lungs every 4 hours as needed for Wheezing 1 Inhaler 3     Review of Systems:  All 14 systems reviewed, all negative except for  sob    Physical Examination:    /69   Pulse 60   Temp 97.7 °F (36.5 °C) (Axillary)   Resp 14   Ht 6' (1.829 m)   Wt 260 lb (117.9 kg)   SpO2 97%   BMI 35.26 kg/m²      Wt Readings from Last 3 Encounters:   02/11/23 260 lb (117.9 kg)   01/11/23 260 lb (117.9 kg)   11/14/22 261 lb (118.4 kg)     Body mass index is 35.26 kg/m². General Appearance:  sob  Head: normocephalic     Eyes: normal, noninjected conjunctiva    ENT: normal mucosa, noninjected throat, normal     NECK: No JVP  No thyromegaly        Cardiovascular: No thrills palpated   Auscultation: Normal S1 and S2,  no murmur   carotid bruit no   Abdominal Aorta no bruit    Respiratory:    Breath sounds Diminshed bilaterally     Extremities:  1+ Edema clubbing ,   no cyanosis    SKIN: Warm and well perfused, no pallor or cyanosis    Vascular exam:  Pedal Pulses: diminished  bilaterally        Abdomen:  No masses or tenderness. No organomegaly noted.     Neurological:  lethargic  Lab Review   Recent Results (from the past 24 hour(s))   POCT Glucose    Collection Time: 02/11/23 10:42 AM   Result Value Ref Range    POC Glucose 214 (H) 70 - 99 MG/DL   POC Blood Glucose    Collection Time: 02/11/23 10:44 AM   Result Value Ref Range    QC OK? 214    Blood Gas, Venous    Collection Time: 02/11/23 10:45 AM   Result Value Ref Range    pH, [place] 7.02 (L) 7.32 - 7.42    pCO2, [place] 63 (H) 38 - 52 mmHG    pO2, [place] 41 28 - 48 mmHG    Base Excess 15 (H) 0 - 3.3    HCO3, Venous 16.3 (L) 19 - 25 MMOL/L    O2 Sat, [place] 59.1 50 - 70 %    Comment VBG    CBC with Auto Differential    Collection Time: 02/11/23 10:47 AM   Result Value Ref Range    WBC 4.6 4.0 - 10.5 K/CU MM    RBC 4.01 (L) 4.6 - 6.2 M/CU MM    Hemoglobin 10.3 (L) 13.5 - 18.0 GM/DL    Hematocrit 37.0 (L) 42 - 52 %    MCV 92.3 78 - 100 FL    MCH 25.7 (L) 27 - 31 PG    MCHC 27.8 (L) 32.0 - 36.0 %    RDW 18.4 (H) 11.7 - 14.9 %    Platelets 615 472 - 320 K/CU MM    MPV 10.6 7.5 - 11.1 FL    Differential Type AUTOMATED DIFFERENTIAL     Segs Relative 63.9 36 - 66 %    Lymphocytes % 25.4 24 - 44 %    Monocytes % 9.3 (H) 0 - 4 %    Eosinophils % 0.6 0 - 3 %    Basophils % 0.6 0 - 1 %    Segs Absolute 3.0 K/CU MM    Lymphocytes Absolute 1.2 K/CU MM    Monocytes Absolute 0.4 K/CU MM    Eosinophils Absolute 0.0 K/CU MM    Basophils Absolute 0.0 K/CU MM    Nucleated RBC % 1.3 %    Total Nucleated RBC 0.1 K/CU MM    Total Immature Neutrophil 0.01 K/CU MM    Immature Neutrophil % 0.2 0 - 0.43 %   Comprehensive Metabolic Panel    Collection Time: 02/11/23 10:47 AM   Result Value Ref Range    Sodium 137 135 - 145 MMOL/L    Potassium 6.5 (HH) 3.5 - 5.1 MMOL/L    Chloride 108 99 - 110 mMol/L    CO2 16 (L) 21 - 32 MMOL/L    BUN 71 (H) 6 - 23 MG/DL    Creatinine 4.8 (H) 0.9 - 1.3 MG/DL    Est, Glom Filt Rate 12 (L) >60 mL/min/1.73m2    Glucose 170 (H) 70 - 99 MG/DL    Calcium 8.2 (L) 8.3 - 10.6 MG/DL    Albumin 3.3 (L) 3.4 - 5.0 GM/DL    Total Protein 7.5 6.4 - 8.2 GM/DL    Total Bilirubin 0.5 0.0 - 1.0 MG/DL    ALT 12 10 - 40 U/L    AST 19 15 - 37 IU/L    Alkaline Phosphatase 101 40 - 129 IU/L    Anion Gap 13 4 - 16   Troponin    Collection Time: 02/11/23 10:47 AM   Result Value Ref Range    Troponin T 0.107 (HH) <0.01 NG/ML   Brain Natriuretic Peptide    Collection Time: 02/11/23 10:47 AM   Result Value Ref Range    Pro-BNP 15,783 (H) <300 PG/ML   EKG 12 Lead    Collection Time: 02/11/23 10:47 AM   Result Value Ref Range    Ventricular Rate 75 BPM    Atrial Rate 39 BPM    P-R Interval 372 ms    QRS Duration 110 ms    Q-T Interval 694 ms    QTc Calculation (Bazett) 774 ms    P Axis -33 degrees    R Axis -21 degrees    T Axis 134 degrees    Diagnosis       Atrial-paced rhythm with prolonged AV conduction with occasional supraventricular complexes and with premature ventricular or aberrantly conducted  complexes  Low voltage QRS  Incomplete right bundle branch block  Cannot rule out Anterior infarct , age undetermined  Abnormal ECG  When compared with ECG of 11-NOV-2022 00:48,  Significant changes have occurred     COVID-19, Rapid    Collection Time: 02/11/23 10:49 AM    Specimen: Nasopharyngeal   Result Value Ref Range    Source UNKNOWN     SARS-CoV-2, NAAT NOT DETECTED NOT DETECTED   Rapid Flu Swab    Collection Time: 02/11/23 10:49 AM    Specimen: Nasopharyngeal   Result Value Ref Range    Rapid Influenza A Ag NEGATIVE NEGATIVE    Rapid Influenza B Ag NEGATIVE NEGATIVE   Blood Gas, Venous    Collection Time: 02/11/23  1:00 PM   Result Value Ref Range    pH, [place] 7.04 (L) 7.32 - 7.42    pCO2, [place] 67 (H) 38 - 52 mmHG    pO2, [place] 61 (H) 28 - 48 mmHG    Base Excess 13 (H) 0 - 3.3    HCO3, Venous 18.1 (L) 19 - 25 MMOL/L    O2 Sat, [place] 82.9 (H) 50 - 70 %    Comment VBG            Assessment/Recommendations:     - cad patient history of known CAD and stents in all 3 arteries and had cardiac cath done in November 2018 which showed the stents are patent  Last Cardiolite done in August 2020 showed the EF was 48% showed inferior wall MI no ischemia was seen  Patient's troponin is elevated there probably secondary to type II elevation given that the patient creatinine is for now    - acute kidney injury with the newly elevated creatinine to high level    -hfpef aggressive diuresis and monitor      -icd last ICD check was on 1/16/2023 which showed that the OptiVol was normal    -pvd had multiple interventions done last 1 was done in October of last year           Karina Ferrer MD, 2/11/2023 1:54 PM       Please note this report has been partially produced using speech recognition software and may contain errors related to that system including errors in grammar, punctuation, and spelling, as well as words and phrases that may be inappropriate. If there are any questions or concerns please feel free to contact the dictating provider for clarification.

## 2023-02-11 NOTE — ED NOTES
ED TO INPATIENT SBAR HANDOFF    Patient Name: Tegan Adamson   :  1950  67 y.o. MRN:  [id number]  Preferred Name  [facility]  ED Room #:  TR02/02TR-02  Family/Caregiver Present yes   Restraints no   Sitter no   Sepsis Risk Score Sepsis Risk Score: 1.41    Situation  Code Status: Prior Limited Code details: Intubation/Re-intubation No Comment; Defibrillation/Cardioversion No Comment; Chest Compressions No Comment; Resuscitative Medications No Comment; Other No Comment  . Allergies: Pcn [penicillins] and Fentanyl  Weight: Patient Vitals for the past 96 hrs (Last 3 readings):   Weight   23 1043 260 lb (117.9 kg)     Arrived from: home  Chief Complaint:   Chief Complaint   Patient presents with   [address] Problem/Diagnosis:  Principal Problem:    Acute respiratory failure with hypoxia and hypercapnia ([address]  Resolved Problems:    * No resolved hospital problems. *    Imaging:   XR CHEST PORTABLE   Final Result   Findings consistent with congestive heart failure and/or bilateral   mid-basilar pneumonia. Small bibasilar pleural effusions suggested. Findings are generally worse on the right.            Abnormal labs:   Abnormal Labs Reviewed   CBC WITH AUTO DIFFERENTIAL - Abnormal; Notable for the following components:       Result Value    RBC 4.01 (*)     Hemoglobin 10.3 (*)     Hematocrit 37.0 (*)     MCH 25.7 (*)     MCHC 27.8 (*)     RDW 18.4 (*)     Monocytes % 9.3 (*)     All other components within normal limits   COMPREHENSIVE METABOLIC PANEL - Abnormal; Notable for the following components:    Potassium 6.5 (*)     CO2 16 (*)     BUN 71 (*)     Creatinine 4.8 (*)     Est, Glom Filt Rate 12 (*)     Glucose 170 (*)     Calcium 8.2 (*)     Albumin 3.3 (*)     All other components within normal limits   TROPONIN - Abnormal; Notable for the following components:    Troponin T 0.107 (*)     All other components within normal limits   BRAIN NATRIURETIC PEPTIDE - Abnormal; Notable for the following components:    Pro-BNP 15,783 (*)     All other components within normal limits   BLOOD GAS, VENOUS - Abnormal; Notable for the following components:    pH, [place] 7.02 (*)     pCO2, [place] 63 (*)     Base Excess 15 (*)     HCO3, Venous 16.3 (*)     All other components within normal limits   POCT GLUCOSE - Abnormal; Notable for the following components:    POC Glucose 214 (*)     All other components within normal limits     Critical values: yes     Abnormal Assessment Findings: k+ 6.5    Background  History:   Past Medical History:   Diagnosis Date    Acid reflux     Acute MI ([facility] 2004, 2008    Arthritis     Back    Broken teeth     Upper Front    CAD (coronary artery disease)     [facility]es [facility])     per old chart    Cerebral artery occlusion with cerebral infarction ([facility]     CHF (congestive heart failure) (HCC)     Chronic back pain     Chronic kidney disease     STAGE 3 KIDNEY FAILURE- \"from my diabetes- do not follow with any one- have seen Dr Shelbi Rose in the past\"    Diabetes mellitus ([address] Dx 1965    per old chart pt has been diabetic since age 13    Diabetic neuropathy ([facility]     \"in my feet\"    H/O cardiovascular stress test 08/25/2016    H/O Doppler ultrasound 09/27/2018    Moderate disease of the right lower extremity with an JALEN of 0.72.   Moderate to severe disease of the left lower extremity with an AJLEN of 0.55.   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    Neuropathy     both feet    neuropathy     PAD (peripheral artery disease) ([facility] 09/27/2018    PVD (peripheral vascular disease) [facility]     Sick sinus syndrome ([facility]     Sleep apnea     \"sleep study 3 yrs ago- could not tolerate the cpap made me too dry\"    Spinal stenosis     Teeth missing     Upper And Lower    Type 2 diabetes mellitus without complication ([facility]     WD-Chronic foot ulcer, left, with necrosis of bone ([facility] 11/12/2021       Assessment    Vitals/MEWS: MEWS Score: 0  Level of Consciousness: Alert (0)   Vitals:    02/11/23 1102 02/11/23 1103 02/11/23 1131 02/11/23 1215   BP: 128/71   137/68   Pulse: 60 60 60 60   Resp: 13 16 13 14   Temp:    97.7 °F (36.5 °C)   TempSrc:    Axillary   SpO2:  98% 96% 97%   Weight:       Height:         FiO2 (%): placed on BiPAP  O2 Flow Rate: O2 Device: PAP (positive airway pressure)    Cardiac Rhythm: NSR  Pain Assessment:  [] Verbal [] Mauricio Murders Scale  Pain Scale: Pain Assessment  Pain Assessment: None - Denies Pain  Last documented pain score (0-10 scale)    Last documented pain medication administered: none  Mental Status: oriented  NIH Score: NIH     C-SSRS: Risk of Suicide: No Risk  Bedside swallow:    [place] Coma Scale (GCS): [place] Coma Scale  Eye Opening: Spontaneous  Best Verbal Response: Oriented  Best Motor Response: Obeys commands  Jalen Coma Scale Score: 15  Active LDA's:   Peripheral IV 02/11/23 Right Antecubital (Active)   Site Assessment Clean, dry & intact 02/11/23 1218   Line Status Blood return noted;Normal saline locked 02/11/23 1218   Phlebitis Assessment No symptoms 02/11/23 1218   Infiltration Assessment 0 02/11/23 1218     PO Status:  Pertinent or High Risk Medications/Drips: yes   o If Yes, please provide details: dextrose 10%  Pending Blood Product Administration: no     You may also review the ED PT Care Timeline found under the Summary Nursing Index tab. Recommendation    Pending orders   Plan for Discharge (if known):    Additional Comments:    If any further questions, please call Sending RN at [phone]    Electronically signed by: Electronically signed by Zoila Mcdonnell RN on 2/11/2023 at 12:20 PM       Zoila Mcdonnell RN  02/11/23 [id number]

## 2023-02-11 NOTE — CONSULTS
[facility] [address], [address]   Consult Note  [facility] 1 2 3 4 5    Date: 2023   Patient: Helen Bernstein   : 1950   DOA: 2023   MRN: [id number]   ROOM#: 6/6-A     Reason for Consult:  parkinson catheter placement  Requesting Physician:  Dr. Johnathan Blair  Collaborating Urologist on Call at time of admission: Daniel Harkins:  heart failure    History Obtained From:  electronic medical record    HISTORY OF PRESENT ILLNESS:                The patient is a 67 y.o. male with significant past medical history of ? ?? who presented with SOB - sever LE edema and attempt to place parkinson unsucessfull due to lasix gtt    Past Medical History:        Diagnosis Date    Acid reflux     Acute MI ([address] ,     Arthritis     Back    Broken teeth     Upper Front    CAD (coronary artery disease)     Sees Dr. Marie [facility])     per old chart    Cerebral artery occlusion with cerebral infarction ([address]     CHF (congestive heart failure) (HCC)     Chronic back pain     Chronic kidney disease     STAGE 3 KIDNEY FAILURE- \"from my diabetes- do not follow with any one- have seen Dr Julia Owens in the past\"    Diabetes mellitus ([address] Dx 1965    per old chart pt has been diabetic since age 13    Diabetic neuropathy ([address]     \"in my feet\"    H/O cardiovascular stress test 2016    H/O Doppler ultrasound 2018    Moderate disease of the right lower extremity with an JALEN of 0.72. Moderate to severe disease of the left lower extremity with an JALEN of 0.55.     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    Neuropathy both feet    neuropathy     PAD (peripheral artery disease) ([facility] 09/27/2018    PVD (peripheral vascular disease) ([facility]     Sick sinus syndrome (HCC)     Sleep apnea     \"sleep study 3 yrs ago- could not tolerate the cpap made me too dry\"    Spinal stenosis     Teeth missing     Upper And Lower    Type 2 diabetes mellitus without complication ([facility]     WD-Chronic foot ulcer, left, with necrosis of bone ([facility] 11/12/2021     Past Surgical History:        Procedure Laterality Date    CARDIAC CATHETERIZATION      per old chart done 10/2014    CARDIAC CATHETERIZATION  07/14/2017    with angiography of leg    CARDIAC CATHETERIZATION  11/20/2018    PATENT STENTS OF ALL THREE MAJOR VESSELS    CARDIAC DEFIBRILLATOR PLACEMENT  06/04/2010    Medtronic Seceusebio DIAZ Defibrillator Implanted    COLECTOMY Right 08/26/2016    laparascopic; robotic assisted    COLONOSCOPY  08/04/2016    CORONARY ANGIOPLASTY      \"15 Heart Stents\"    CORONARY ANGIOPLASTY WITH STENT PLACEMENT      per old chart had angio with stent to circumflex and obtuse marginal artery at [facility] 5/2010( old chart also gives hx of stent placement done 2000,2004 and 2005)    DENTAL SURGERY      Teeth Extracted In Past    IR TUNNELED CATHETER PLACEMENT GREATER THAN 5 YEARS  6/14/2021    IR TUNNELED CATHETER PLACEMENT GREATER THAN 5 YEARS 6/14/2021 [facility]    IR TUNNELED CATHETER PLACEMENT GREATER THAN 5 YEARS  11/17/2022    IR TUNNELED CATHETER PLACEMENT GREATER THAN 5 YEARS 11/17/2022 [facility]    PACEMAKER PLACEMENT  06/04/2010    Medtronic [address] Defibrillator Implanted    TOE AMPUTATION Right 09/12/2017    Rt 3rd toe    TOE AMPUTATION Right 01/09/2018     Right 5th toe amputation and Toenails trimmed left 2,3,4 and 5th toes    TOE AMPUTATION Left 12/26/2020    LEFT GREAT TOE AMPUTATION performed by Flavio Adkins MD at [address] Left 7/26/2022    LEFT SECOND TOE AMPUTATION performed by Ty Bledsoe MD at [place] [address] TOE AMPUTATION Right 10/20/2022    Right First TOE AMPUTATION performed by Saqib Topete MD at [address] [place] [address]      per old chart had balloon angioplasty right superfical femoral artery,right popliteal artery,,right ant.tibial artery, right tibioperoneal trunk, and right post.tibial artery wna stent placement right popliteal artery and superfical femoral artery 7/2012     Current Medications:   Current Facility-Administered Medications: glucose chewable tablet 16 g, 4 tablet, Oral, PRN  dextrose bolus 10% 125 mL, 125 mL, IntraVENous, PRN **OR** dextrose bolus 10% 250 mL, 250 mL, IntraVENous, PRN  glucagon (rDNA) injection 1 mg, 1 mg, SubCUTAneous, PRN  dextrose 10 % infusion, , IntraVENous, Continuous PRN  sodium zirconium cyclosilicate (LOKELMA) oral suspension 10 g, 10 g, Oral, Once  furosemide (LASIX) 100 mg in sodium chloride 0.9 % 100 mL infusion, 10 mg/hr, IntraVENous, Continuous  sodium zirconium cyclosilicate (LOKELMA) oral suspension 10 g, 10 g, Oral, TID  sodium chloride flush 0.9 % injection 5-40 mL, 5-40 mL, IntraVENous, 2 times per day  sodium chloride flush 0.9 % injection 10 mL, 10 mL, IntraVENous, PRN  0.9 % sodium chloride infusion, , IntraVENous, PRN  cefTRIAXone (ROCEPHIN) 1,000 mg in sodium chloride 0.9 % 50 mL IVPB (mini-bag), 1,000 mg, IntraVENous, Q24H **AND** azithromycin (ZITHROMAX) 500 mg in sodium chloride 0.9 % 250 mL IVPB ([id number]), 500 mg, IntraVENous, Q24H  [START ON 2/12/2023] aspirin chewable tablet 81 mg, 81 mg, Oral, Daily  [START ON 2/12/2023] amLODIPine (NORVASC) tablet 10 mg, 10 mg, Oral, Daily  carvedilol (COREG) tablet 25 mg, 25 mg, Oral, BID  [START ON 2/12/2023] clopidogrel (PLAVIX) tablet 75 mg, 75 mg, Oral, Daily  gabapentin (NEURONTIN) capsule 300 mg, 300 mg, Oral, BID  metOLazone (ZAROXOLYN) tablet 2.5 mg, 2.5 mg, Oral, BID  ipratropium-albuterol (DUONEB) nebulizer solution 1 ampule, 1 ampule, Inhalation, Q4H WA    Allergies:  Pcn [penicillins] and Fentanyl    Social History:   TOBACCO:   reports that he has been smoking cigars. He has never used smokeless tobacco.  ETOH:   reports no history of alcohol use. DRUGS:   reports current drug use. Drug: Marijuana Lorelei Ahuja). Family History:       Problem Relation Age of Onset    Diabetes Mother     Stroke Mother     High Blood Pressure Mother     Vision Loss Mother     Cancer Father         Prostate Cancer    Diabetes Sister     Neuropathy Sister     Other Sister         \"Breathing Problems\"    Heart Disease Sister     Early Death Sister 62        Heart Complications    Cancer Brother         \"Stomach Cancer\"    High Blood Pressure Brother     Diabetes Brother     Heart Disease Brother     High Blood Pressure Brother     Cancer Son         \"Testicle Cancer\"       REVIEW OF SYSTEMS:     Review of systems not obtained due to patient factors - mental status    PHYSICAL EXAM:      VITALS:  /65   Pulse 63   Temp (!) 92.8 °F (33.8 °C) (Rectal)   Resp 14   Ht 6' (1.829 m)   Wt 260 lb (117.9 kg)   SpO2 100%   BMI 35.26 kg/m²      TEMPERATURE:  Current - Temp: (!) 92.8 °F (33.8 °C);  Max - Temp  Av.5 °F (34.7 °C)  Min: 92.8 °F (33.8 °C)  Max: 97.7 °F (36.5 °C)  24HR BLOOD PRESSURE RANGE:  Systolic (24YGR), EDL:105 , Min:113 , XOU:458   ; Diastolic (01QIE), GPI:98, Min:63, Max:73      General appearance: appears stated age and cyanotic  Head: Normocephalic, without obvious abnormality, atraumatic  Male genitalia: normal, lymphedema  Extremities: edema to abd    DATA:    WBC:    Lab Results   Component Value Date/Time    WBC 4.6 2023 10:47 AM     Hemoglobin/Hematocrit:    Lab Results   Component Value Date/Time    HGB 10.3 2023 10:47 AM    HCT 37.0 2023 10:47 AM     BMP:    Lab Results   Component Value Date/Time     2023 04:00 PM    K 6.9 2023 04:00 PM    K 5.1 01/10/2018 04:32 AM     2023 04:00 PM    CO2 21 2023 04:00 PM    BUN 73 2023 04:00 PM LABALBU 3.3 02/11/2023 10:47 AM    CREATININE 4.8 02/11/2023 04:00 PM    CALCIUM 8.2 02/11/2023 04:00 PM    GFRAA 25 10/17/2022 06:50 AM    LABGLOM 12 02/11/2023 04:00 PM     PT/INR:    Lab Results   Component Value Date/Time    PROTIME 14.0 01/11/2023 08:50 AM    PROTIME 11.2 09/08/2011 06:02 PM    INR 1.08 01/11/2023 08:50 AM       Blood Culture:   Urine Culture:     Imaging:  XR CHEST PORTABLE    Result Date: 2/11/2023  EXAMINATION: ONE XRAY VIEW OF THE CHEST 2/11/2023 11:04 am COMPARISON: 11/11/2022 HISTORY: ORDERING SYSTEM PROVIDED HISTORY: SOB TECHNOLOGIST PROVIDED HISTORY: Reason for exam:->SOB Reason for Exam: SOB FINDINGS: Cardiomegaly, pulmonary vascular congestion/edema, consolidating infiltrative changes mid-basilar regions bilaterally worse on the right. Bibasilar pleural effusions suggested. Findings are consistent with congestive heart failure and/or bibasilar pneumonia. No pneumothorax. Findings consistent with congestive heart failure and/or bilateral mid-basilar pneumonia. Small bibasilar pleural effusions suggested. Findings are generally worse on the right. Assessment & Plan:      Tegan Adamson is a 67y.o. year old male admitted 2/11/2023 for parkinson placement    1) Patient with distal urethral stricture and due to being on plavix and PVR of 90 cc I recommend holding on parkinson placement for now - if develops AUR then will consider dialtion - will follow with you      Patient seen and examined, chart reviewed.      Electronically signed by Andrzej Reynolds MD on 2/11/2023 at 5:36 PM

## 2023-02-11 NOTE — Clinical Note
Discharge Plan[de-identified] Other/[facility])   Telemetry/Cardiac Monitoring Required?: Yes

## 2023-02-11 NOTE — PROGRESS NOTES
Intubated by Yuriy Garza NP at bedside ETT 7.5 25 at the lip. NG 65 in Lt nare. Vas cath placed.  Chest xray and kub ordered

## 2023-02-11 NOTE — H&P
V2.0  History and Physical      Name:  Mando Stephens /Age/Sex: 1950  (67 y.o. male)   MRN & CSN:  [id number] & [id number] Encounter Date/Time: 2023 5:18 PM EST   Location:  -A PCP: Kameron Isaac Day: 1    Assessment and Plan:   Mando Stephens is a 67 y.o. male with a pmh of CAD, HFpEF, ICD, CKD, DM who presents with Acute respiratory failure with hypoxia and hypercapnia [facility])    Hospital Problems             Last Modified POA    * (Principal) Acute respiratory failure with hypoxia and hypercapnia ([facility] 2023 Yes    Acute kidney injury superimposed on CKD ([facility] 2023 Yes   Hyperkalemia  Metabolic encephalopathy  Chronic LE wounds   HTN    Plan    Neuro - mentation improved on bipap per ER. arousable and oriented to self, place and time    CV - acute on chronic HF. EF 50-55% in . Planned for diuresis with lasix  ICD interrogated in      Resp - acute hypoxic hypercapneic resp failure. On high bipap settings 20/5, FiO2 40%. Repeat ABG. Continue bipap. Diuresis as below    GI - npo for now. GI prophylaxis     - NANCY on CKD 3/4. Baseline Cr 2.2. adm Cr 4.8. Hyperkalemia. Treated with lokelma and cocktail. Planned for lasix gtt per Nephro. Repeat BMP pending. If K > 6 , will need to initiate urgent HD. Per chart, was on bactrim recently  Urology - for difficult parkinson    ID - will cover for CAP with CTX and azithro    Heme - HSQ    MSK - LE wounds bilaterally. Wound care consult. Wet-dry for now with xeroform. Disposition:   Current Living situation: home  Expected Disposition: TBD  Estimated D/C: TBD    Diet Diet NPO   DVT Prophylaxis [] Lovenox, [x]  Heparin, [] SCDs, [] Ambulation,  [] Eliquis, [] Xarelto, [] Coumadin   Code Status Full Code   Surrogate Decision Maker/ POA Children? History from:     electronic medical record    History of Present Illness:     Seen in the ICU. On bipap. Oriented to self, place and time.  Reports improvement in breathing. States he hs not made urine, unable to state for how long. Received hyperk cocktail. Review of Systems: Need 10 Elements   Review of Systems    Unable to assess. Denies pain. Reports breathing improved     Objective:   No intake or output data in the 24 hours ending 02/11/23 1718   Vitals:   Vitals:    02/11/23 1500 02/11/23 1529 02/11/23 1542 02/11/23 1707   BP: 129/70  118/66 119/65   Pulse: 65  60 63   Resp: 20 20 13 14   Temp:   (!) 93 °F (33.9 °C) (!) 92.8 °F (33.8 °C)   TempSrc: Axillary  Rectal Rectal   SpO2: 100%      Weight:       Height:           Medications Prior to Admission     Prior to Admission medications    Medication Sig Start Date End Date Taking? Authorizing Provider   oxyCODONE-acetaminophen (PERCOCET) 7.5-325 MG per tablet Take 1 tablet by mouth every 8 hours as needed for Pain for up to 7 days. Intended supply: 7 days Max Daily Amount: 3 tablets 2/9/23 2/16/23  MICKY Ibrahim CNP   sulfamethoxazole-trimethoprim (BACTRIM) 400-80 MG per tablet Take 1 tablet by mouth daily for 7 days 2/9/23 2/16/23  MICKY Ibrahim CNP   gentamicin (GARAMYCIN) 0.1 % cream Apply topically 3 times daily 2/9/23   MICKY Ibrahim CNP   torsemide (DEMADEX) 100 MG tablet TAKE 1 TABLET BY MOUTH TWICE A DAY IN THE MORNING AND IN THE EVENING 1/30/23   Veronica Briones MD   gabapentin (NEURONTIN) 300 MG capsule Take 300 mg by mouth in the morning and at bedtime.     Historical Provider, MD   atorvastatin (LIPITOR) 40 MG tablet Take 1 tablet by mouth nightly  Patient not taking: No sig reported 11/18/22   Endy Patton MD   carvedilol (COREG) 25 MG tablet Take 1 tablet by mouth 2 times daily 11/18/22   Endy Patton MD   clopidogrel (PLAVIX) 75 MG tablet Take 1 tablet by mouth daily 11/18/22   Endy Patton MD   amLODIPine [facility]) 10 MG tablet Take 1 tablet by mouth daily 11/19/22   Endy Patton MD   empagliflozin (JARDIANCE) 10 MG tablet Take 1 tablet by mouth daily  Patient not taking: No sig reported 11/19/22   Jeff Klein MD   metOLazone (ZAROXOLYN) 2.5 MG tablet Take 1 tablet by mouth in the morning and at bedtime 11/18/22   Jeff Klein MD   sodium hypochlorite (DAKINS) 0.125 % SOLN external solution Apply topically daily 11/18/22   Jeff Klein MD   aspirin 81 MG chewable tablet Take 1 tablet by mouth daily 10/25/22   Stephanie Smith MD   isosorbide mononitrate (IMDUR) 30 MG extended release tablet Take 1 tablet by mouth daily  Patient not taking: No sig reported 10/25/22   Stephanie Smith MD   magnesium oxide (MAG-OX) 400 (240 Mg) MG tablet Take 1 tablet by mouth 2 times daily 10/24/22   Stephanie Smith MD   SPIRIVA HANDIHALER 18 MCG inhalation capsule Inhale 18 mcg into the lungs daily  Patient not taking: No sig reported 6/18/21   Historical Provider, MD   albuterol sulfate HFA (VENTOLIN HFA) 108 (90 Base) MCG/ACT inhaler Inhale 2 puffs into the lungs every 4 hours as needed for Wheezing 8/14/20   Beth Mchugh MD       Physical Exam: Need 8 Elements   Physical Exam     General: NAD  Eyes: EOMI, periorbital edema  ENT: neck supple. On bipap  Cardiovascular: Regular rate. Respiratory: Clear to auscultation  Gastrointestinal: Soft, non tender  Genitourinary: no suprapubic tenderness  Musculoskeletal: 2+ edema till thighs. Chronic wound on bilateral LE  Skin: warm, dry  Neuro: lethargic but arousable and oriented to self, place, time. Psych: unable to assess.        Past Medical History:   PMHx   Past Medical History:   Diagnosis Date    Acid reflux     Acute MI ([address] 2004, 2008    Arthritis     Back    Broken teeth     Upper Front    CAD (coronary artery disease)     Sees Dr. Dyan Lebron [facility])     per old chart    Cerebral artery occlusion with cerebral infarction ([facility]     CHF (congestive heart failure) (HCC)     Chronic back pain     Chronic kidney disease     STAGE 3 KIDNEY FAILURE- \"from my diabetes- do not follow with any one- have seen Dr Yvrose Elder in the past\"    Diabetes mellitus [facility]) Dx 1965    per old chart pt has been diabetic since age 13    Diabetic neuropathy ([facility]     \"in my feet\"    H/O cardiovascular stress test 08/25/2016    H/O Doppler ultrasound 09/27/2018    Moderate disease of the right lower extremity with an JALEN of 0.72. Moderate to severe disease of the left lower extremity with an JALEN of 0.55. 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    Neuropathy     both feet    neuropathy     PAD (peripheral artery disease) ([address] 09/27/2018    PVD (peripheral vascular disease) ([address]     Sick sinus syndrome ([address]     Sleep apnea     \"sleep study 3 yrs ago- could not tolerate the cpap made me too dry\"    Spinal stenosis     Teeth missing     Upper And Lower    Type 2 diabetes mellitus without complication ([address]     WD-Chronic foot ulcer, left, with necrosis of bone ([address] 11/12/2021     PSHX:  has a past surgical history that includes Coronary angioplasty with stent; Dental surgery; Colonoscopy (08/04/2016); pacemaker placement (06/04/2010); vascular surgery; colectomy (Right, 08/26/2016); Toe amputation (Right, 09/12/2017); Toe amputation (Right, 01/09/2018); Cardiac catheterization; Cardiac defibrillator placement (06/04/2010); Coronary angioplasty; Cardiac catheterization (07/14/2017); Cardiac catheterization (11/20/2018); Toe amputation (Left, 12/26/2020); IR TUNNELED CVC PLACE WO SQ PORT/PUMP > 5 YEARS (6/14/2021); Toe amputation (Left, 7/26/2022);  Toe amputation (Right, 10/20/2022); and IR TUNNELED CVC PLACE WO SQ PORT/PUMP > 5 YEARS (11/17/2022). Allergies: Allergies   Allergen Reactions    Pcn [Penicillins] Hives    Fentanyl Itching     Fam HX:  family history includes Cancer in his brother, father, and son; Diabetes in his brother, mother, and sister; Early Death (age of onset: 62) in his sister; Heart Disease in his brother and sister; High Blood Pressure in his brother, brother, and mother; Neuropathy in his sister; Other in his sister; Stroke in his mother; Vision Loss in his mother. Soc HX:   Social History     Socioeconomic History    Marital status:     Tobacco Use    Smoking status: Some Days     Types: Cigars    Smokeless tobacco: Never    Tobacco comments:     Client states he has stopped smoking   Vaping Use    Vaping Use: Never used   Substance and Sexual Activity    Alcohol use: No    Drug use: Yes     Types: Marijuana Victorina Tunde)    Sexual activity: Never       Medications:   Medications:    sodium zirconium cyclosilicate  10 g Oral Once    sodium zirconium cyclosilicate  10 g Oral TID    sodium chloride flush  5-40 mL IntraVENous 2 times per day    cefTRIAXone (ROCEPHIN) IV  1,000 mg IntraVENous Q24H    And    azithromycin  500 mg IntraVENous Q24H    [START ON 2/12/2023] aspirin  81 mg Oral Daily    [START ON 2/12/2023] amLODIPine  10 mg Oral Daily    carvedilol  25 mg Oral BID    [START ON 2/12/2023] clopidogrel  75 mg Oral Daily    gabapentin  300 mg Oral BID    metOLazone  2.5 mg Oral BID    ipratropium-albuterol  1 ampule Inhalation Q4H WA      Infusions:    dextrose      furosemide (LASIX) 1mg/mL infusion 10 mg/hr (02/11/23 1517)    sodium chloride       PRN Meds: glucose, 4 tablet, PRN  dextrose bolus, 125 mL, PRN   Or  dextrose bolus, 250 mL, PRN  glucagon (rDNA), 1 mg, PRN  dextrose, , Continuous PRN  sodium chloride flush, 10 mL, PRN  sodium chloride, , PRN        Labs      CBC:   Recent Labs     02/11/23  1047   WBC 4.6   HGB 10.3*        BMP:    Recent Labs     02/11/23  1047      K 6.5*    CO2 16*   BUN 71*   CREATININE 4.8*   GLUCOSE 170*     Hepatic:   Recent Labs     02/11/23  1047   AST 19   ALT 12   BILITOT 0.5   ALKPHOS 101     Lipids:   Lab Results   Component Value Date/Time    CHOL 79 12/11/2020 07:00 AM    HDL 30 12/11/2020 07:00 AM    TRIG 61 12/11/2020 07:00 AM     Hemoglobin A1C:   Lab Results   Component Value Date/Time    LABA1C 7.1 10/12/2022 06:28 AM     TSH: No results found for: TSH  Troponin:   Lab Results   Component Value Date/Time    TROPONINT 0.107 02/11/2023 10:47 AM    TROPONINT 0.063 11/11/2022 12:50 AM    TROPONINT 0.074 10/12/2022 01:21 PM     Lactic Acid: No results for input(s): LACTA in the last 72 hours. BNP:   Recent Labs     02/11/23  1047   PROBNP 15,783*     UA:  Lab Results   Component Value Date/Time    NITRU NEGATIVE 11/11/2022 02:45 PM    COLORU YELLOW 11/11/2022 02:45 PM    PHUR 5.0 08/19/2016 09:38 AM    WBCUA <1 11/11/2022 02:45 PM    RBCUA NONE SEEN 11/11/2022 02:45 PM    MUCUS RARE 11/11/2022 02:45 PM    TRICHOMONAS NONE SEEN 11/11/2022 02:45 PM    BACTERIA NEGATIVE 11/11/2022 02:45 PM    CLARITYU CLEAR 11/11/2022 02:45 PM    SPECGRAV 1.020 11/11/2022 02:45 PM    LEUKOCYTESUR NEGATIVE 11/11/2022 02:45 PM    UROBILINOGEN 0.2 11/11/2022 02:45 PM    BILIRUBINUR NEGATIVE 11/11/2022 02:45 PM    BLOODU NEGATIVE 11/11/2022 02:45 PM    KETUA NEGATIVE 11/11/2022 02:45 PM     Urine Cultures: No results found for: LABURIN  Blood Cultures: No results found for: BC  No results found for: BLOODCULT2  Organism: No results found for: ORG    Imaging/Diagnostics Last 24 Hours   XR CHEST PORTABLE    Result Date: 2/11/2023  EXAMINATION: ONE XRAY VIEW OF THE CHEST 2/11/2023 11:04 am COMPARISON: 11/11/2022 HISTORY: ORDERING SYSTEM PROVIDED HISTORY: SOB TECHNOLOGIST PROVIDED HISTORY: Reason for exam:->SOB Reason for Exam: SOB FINDINGS: Cardiomegaly, pulmonary vascular congestion/edema, consolidating infiltrative changes mid-basilar regions bilaterally worse on the right. Bibasilar pleural effusions suggested.  Findings are consistent with congestive heart failure and/or bibasilar pneumonia.  No pneumothorax.     Findings consistent with congestive heart failure and/or bilateral mid-basilar pneumonia.  Small bibasilar pleural effusions suggested. Findings are generally worse on the right.       Discussed with Dr. Holliday    Critical Care Time spent 50 minutes     Electronically signed by Leigh Ann Adler MD on 2/11/2023 at 5:18 PM

## 2023-02-11 NOTE — ED TRIAGE NOTES
Pt presents to ED via medic for respiratory distress per family. Placed on CPAP prior to arrival. Pt a&ox4 at this time.

## 2023-02-12 LAB
ALBUMIN SERPL-MCNC: 2.5 GM/DL (ref 3.4–5)
ALBUMIN SERPL-MCNC: 2.7 GM/DL (ref 3.4–5)
ALP BLD-CCNC: 70 IU/L (ref 40–128)
ALP BLD-CCNC: 85 IU/L (ref 40–128)
ALT SERPL-CCNC: 10 U/L (ref 10–40)
ALT SERPL-CCNC: 9 U/L (ref 10–40)
ANION GAP SERPL CALCULATED.3IONS-SCNC: 8 MMOL/L (ref 4–16)
AST SERPL-CCNC: 16 IU/L (ref 15–37)
AST SERPL-CCNC: 18 IU/L (ref 15–37)
BILIRUB SERPL-MCNC: 0.5 MG/DL (ref 0–1)
BILIRUB SERPL-MCNC: 0.6 MG/DL (ref 0–1)
BUN SERPL-MCNC: 49 MG/DL (ref 6–23)
BUN SERPL-MCNC: 62 MG/DL (ref 6–23)
BUN SERPL-MCNC: 66 MG/DL (ref 6–23)
CALCIUM IONIZED: 4.16 MG/DL (ref 4.48–5.28)
CALCIUM IONIZED: 4.2 MG/DL (ref 4.48–5.28)
CALCIUM SERPL-MCNC: 6.9 MG/DL (ref 8.3–10.6)
CALCIUM SERPL-MCNC: 7.5 MG/DL (ref 8.3–10.6)
CALCIUM SERPL-MCNC: 7.5 MG/DL (ref 8.3–10.6)
CHLORIDE BLD-SCNC: 105 MMOL/L (ref 99–110)
CHLORIDE BLD-SCNC: 107 MMOL/L (ref 99–110)
CHLORIDE BLD-SCNC: 108 MMOL/L (ref 99–110)
CO2: 21 MMOL/L (ref 21–32)
CO2: 21 MMOL/L (ref 21–32)
CO2: 23 MMOL/L (ref 21–32)
CREAT SERPL-MCNC: 3.9 MG/DL (ref 0.9–1.3)
CREAT SERPL-MCNC: 4.6 MG/DL (ref 0.9–1.3)
CREAT SERPL-MCNC: 5 MG/DL (ref 0.9–1.3)
CULTURE: ABNORMAL
EKG ATRIAL RATE: 39 BPM
EKG DIAGNOSIS: NORMAL
EKG P AXIS: -33 DEGREES
EKG P-R INTERVAL: 372 MS
EKG Q-T INTERVAL: 694 MS
EKG QRS DURATION: 110 MS
EKG QTC CALCULATION (BAZETT): 774 MS
EKG R AXIS: -21 DEGREES
EKG T AXIS: 134 DEGREES
EKG VENTRICULAR RATE: 75 BPM
GFR SERPL CREATININE-BSD FRML MDRD: 12 ML/MIN/1.73M2
GFR SERPL CREATININE-BSD FRML MDRD: 13 ML/MIN/1.73M2
GFR SERPL CREATININE-BSD FRML MDRD: 16 ML/MIN/1.73M2
GLUCOSE BLD-MCNC: 70 MG/DL (ref 70–99)
GLUCOSE BLD-MCNC: 75 MG/DL (ref 70–99)
GLUCOSE BLD-MCNC: 78 MG/DL (ref 70–99)
GLUCOSE BLD-MCNC: 91 MG/DL (ref 70–99)
GLUCOSE SERPL-MCNC: 59 MG/DL (ref 70–99)
GLUCOSE SERPL-MCNC: 62 MG/DL (ref 70–99)
GLUCOSE SERPL-MCNC: 72 MG/DL (ref 70–99)
IONIZED CA: 1.04 MMOL/L (ref 1.12–1.32)
IONIZED CA: 1.05 MMOL/L (ref 1.12–1.32)
Lab: ABNORMAL
MAGNESIUM: 2 MG/DL (ref 1.8–2.4)
MAGNESIUM: 2.1 MG/DL (ref 1.8–2.4)
PHOSPHORUS: 4 MG/DL (ref 2.5–4.9)
PHOSPHORUS: 4.9 MG/DL (ref 2.5–4.9)
POTASSIUM SERPL-SCNC: 5.1 MMOL/L (ref 3.5–5.1)
POTASSIUM SERPL-SCNC: 5.7 MMOL/L (ref 3.5–5.1)
POTASSIUM SERPL-SCNC: 6.4 MMOL/L (ref 3.5–5.1)
SODIUM BLD-SCNC: 136 MMOL/L (ref 135–145)
SODIUM BLD-SCNC: 136 MMOL/L (ref 135–145)
SODIUM BLD-SCNC: 137 MMOL/L (ref 135–145)
SPECIMEN: ABNORMAL
TOTAL PROTEIN: 5.5 GM/DL (ref 6.4–8.2)
TOTAL PROTEIN: 5.9 GM/DL (ref 6.4–8.2)

## 2023-02-12 PROCEDURE — 80053 COMPREHEN METABOLIC PANEL: CPT

## 2023-02-12 PROCEDURE — 2700000000 HC OXYGEN THERAPY PER DAY

## 2023-02-12 PROCEDURE — 82330 ASSAY OF CALCIUM: CPT

## 2023-02-12 PROCEDURE — 2580000003 HC RX 258: Performed by: NURSE PRACTITIONER

## 2023-02-12 PROCEDURE — 94640 AIRWAY INHALATION TREATMENT: CPT

## 2023-02-12 PROCEDURE — 2000000000 HC ICU R&B

## 2023-02-12 PROCEDURE — 94761 N-INVAS EAR/PLS OXIMETRY MLT: CPT

## 2023-02-12 PROCEDURE — 2580000003 HC RX 258: Performed by: EMERGENCY MEDICINE

## 2023-02-12 PROCEDURE — 80048 BASIC METABOLIC PNL TOTAL CA: CPT

## 2023-02-12 PROCEDURE — 5A1D90Z PERFORMANCE OF URINARY FILTRATION, CONTINUOUS, GREATER THAN 18 HOURS PER DAY: ICD-10-PCS | Performed by: INTERNAL MEDICINE

## 2023-02-12 PROCEDURE — 36592 COLLECT BLOOD FROM PICC: CPT

## 2023-02-12 PROCEDURE — 83735 ASSAY OF MAGNESIUM: CPT

## 2023-02-12 PROCEDURE — 93010 ELECTROCARDIOGRAM REPORT: CPT | Performed by: INTERNAL MEDICINE

## 2023-02-12 PROCEDURE — A4216 STERILE WATER/SALINE, 10 ML: HCPCS | Performed by: NURSE PRACTITIONER

## 2023-02-12 PROCEDURE — 6370000000 HC RX 637 (ALT 250 FOR IP): Performed by: NURSE PRACTITIONER

## 2023-02-12 PROCEDURE — 2580000003 HC RX 258: Performed by: INTERNAL MEDICINE

## 2023-02-12 PROCEDURE — 2500000003 HC RX 250 WO HCPCS: Performed by: NURSE PRACTITIONER

## 2023-02-12 PROCEDURE — 2500000003 HC RX 250 WO HCPCS

## 2023-02-12 PROCEDURE — 51798 US URINE CAPACITY MEASURE: CPT

## 2023-02-12 PROCEDURE — 84100 ASSAY OF PHOSPHORUS: CPT

## 2023-02-12 PROCEDURE — 6360000002 HC RX W HCPCS: Performed by: INTERNAL MEDICINE

## 2023-02-12 PROCEDURE — 2500000003 HC RX 250 WO HCPCS: Performed by: INTERNAL MEDICINE

## 2023-02-12 PROCEDURE — 90945 DIALYSIS ONE EVALUATION: CPT

## 2023-02-12 PROCEDURE — 36556 INSERT NON-TUNNEL CV CATH: CPT

## 2023-02-12 PROCEDURE — 82962 GLUCOSE BLOOD TEST: CPT

## 2023-02-12 PROCEDURE — 99233 SBSQ HOSP IP/OBS HIGH 50: CPT | Performed by: INTERNAL MEDICINE

## 2023-02-12 PROCEDURE — 6360000002 HC RX W HCPCS: Performed by: NURSE PRACTITIONER

## 2023-02-12 RX ORDER — MAGNESIUM SULFATE 1 G/100ML
1000 INJECTION INTRAVENOUS PRN
Status: DISCONTINUED | OUTPATIENT
Start: 2023-02-12 | End: 2023-02-15

## 2023-02-12 RX ORDER — CALCIUM GLUCONATE 20 MG/ML
2000 INJECTION, SOLUTION INTRAVENOUS PRN
Status: DISCONTINUED | OUTPATIENT
Start: 2023-02-12 | End: 2023-02-15

## 2023-02-12 RX ORDER — CALCIUM GLUCONATE 20 MG/ML
1000 INJECTION, SOLUTION INTRAVENOUS PRN
Status: DISCONTINUED | OUTPATIENT
Start: 2023-02-12 | End: 2023-02-15

## 2023-02-12 RX ORDER — NOREPINEPHRINE BIT/0.9 % NACL 16MG/250ML
1-100 INFUSION BOTTLE (ML) INTRAVENOUS CONTINUOUS
Status: DISCONTINUED | OUTPATIENT
Start: 2023-02-12 | End: 2023-02-15

## 2023-02-12 RX ORDER — POTASSIUM CHLORIDE 29.8 MG/ML
20 INJECTION INTRAVENOUS PRN
Status: DISCONTINUED | OUTPATIENT
Start: 2023-02-12 | End: 2023-02-15

## 2023-02-12 RX ADMIN — Medication: at 09:33

## 2023-02-12 RX ADMIN — SODIUM CHLORIDE, PRESERVATIVE FREE 10 ML: 5 INJECTION INTRAVENOUS at 09:49

## 2023-02-12 RX ADMIN — PROPOFOL 20 MCG/KG/MIN: 10 INJECTION, EMULSION INTRAVENOUS at 08:38

## 2023-02-12 RX ADMIN — ASPIRIN 81 MG CHEWABLE TABLET 81 MG: 81 TABLET CHEWABLE at 09:49

## 2023-02-12 RX ADMIN — IPRATROPIUM BROMIDE AND ALBUTEROL SULFATE 1 AMPULE: 2.5; .5 SOLUTION RESPIRATORY (INHALATION) at 14:37

## 2023-02-12 RX ADMIN — PROPOFOL 20 MCG/KG/MIN: 10 INJECTION, EMULSION INTRAVENOUS at 02:30

## 2023-02-12 RX ADMIN — AZITHROMYCIN MONOHYDRATE 500 MG: 500 INJECTION, POWDER, LYOPHILIZED, FOR SOLUTION INTRAVENOUS at 16:13

## 2023-02-12 RX ADMIN — Medication 5 MCG/MIN: at 14:30

## 2023-02-12 RX ADMIN — CHLORHEXIDINE GLUCONATE 0.12% ORAL RINSE 15 ML: 1.2 LIQUID ORAL at 09:49

## 2023-02-12 RX ADMIN — Medication: at 09:35

## 2023-02-12 RX ADMIN — Medication: at 23:05

## 2023-02-12 RX ADMIN — SODIUM CHLORIDE, PRESERVATIVE FREE 20 MG: 5 INJECTION INTRAVENOUS at 09:49

## 2023-02-12 RX ADMIN — Medication: at 16:20

## 2023-02-12 RX ADMIN — PROPOFOL 20 MCG/KG/MIN: 10 INJECTION, EMULSION INTRAVENOUS at 22:42

## 2023-02-12 RX ADMIN — CALCIUM CHLORIDE 8000 MG: 100 INJECTION, SOLUTION INTRAVENOUS at 09:03

## 2023-02-12 RX ADMIN — IPRATROPIUM BROMIDE AND ALBUTEROL SULFATE 1 AMPULE: 2.5; .5 SOLUTION RESPIRATORY (INHALATION) at 07:26

## 2023-02-12 RX ADMIN — FUROSEMIDE 10 MG/HR: 10 INJECTION, SOLUTION INTRAMUSCULAR; INTRAVENOUS at 04:59

## 2023-02-12 RX ADMIN — CHLORHEXIDINE GLUCONATE 0.12% ORAL RINSE 15 ML: 1.2 LIQUID ORAL at 22:41

## 2023-02-12 RX ADMIN — Medication: at 09:34

## 2023-02-12 RX ADMIN — IPRATROPIUM BROMIDE AND ALBUTEROL SULFATE 1 AMPULE: 2.5; .5 SOLUTION RESPIRATORY (INHALATION) at 19:59

## 2023-02-12 RX ADMIN — Medication: at 12:51

## 2023-02-12 RX ADMIN — DEXTROSE MONOHYDRATE 125 ML: 100 INJECTION, SOLUTION INTRAVENOUS at 00:14

## 2023-02-12 RX ADMIN — SODIUM CHLORIDE, PRESERVATIVE FREE 10 ML: 5 INJECTION INTRAVENOUS at 09:50

## 2023-02-12 RX ADMIN — PROPOFOL 20 MCG/KG/MIN: 10 INJECTION, EMULSION INTRAVENOUS at 14:01

## 2023-02-12 RX ADMIN — CEFTRIAXONE SODIUM 1000 MG: 1 INJECTION, POWDER, FOR SOLUTION INTRAMUSCULAR; INTRAVENOUS at 15:39

## 2023-02-12 RX ADMIN — CLOPIDOGREL BISULFATE 75 MG: 75 TABLET ORAL at 09:49

## 2023-02-12 RX ADMIN — Medication: at 19:46

## 2023-02-12 RX ADMIN — Medication: at 14:38

## 2023-02-12 RX ADMIN — IPRATROPIUM BROMIDE AND ALBUTEROL SULFATE 1 AMPULE: 2.5; .5 SOLUTION RESPIRATORY (INHALATION) at 11:35

## 2023-02-12 ASSESSMENT — PAIN SCALES - GENERAL: PAINLEVEL_OUTOF10: 0

## 2023-02-12 ASSESSMENT — PULMONARY FUNCTION TESTS
PIF_VALUE: 20
PIF_VALUE: 20
PIF_VALUE: 23
PIF_VALUE: 20
PIF_VALUE: 20
PIF_VALUE: 21
PIF_VALUE: 22
PIF_VALUE: 21
PIF_VALUE: 20
PIF_VALUE: 21
PIF_VALUE: 23
PIF_VALUE: 20
PIF_VALUE: 18
PIF_VALUE: 20
PIF_VALUE: 20
PIF_VALUE: 22
PIF_VALUE: 20
PIF_VALUE: 28
PIF_VALUE: 20
PIF_VALUE: 23
PIF_VALUE: 22
PIF_VALUE: 25

## 2023-02-12 NOTE — PROCEDURES
Intubation Procedure Note    Indication: Respiratory failure, impending respiratory failure, and airway protection    Consent: The patient's daughter counseled regarding the procedure, its indications, risks, potential complications and alternatives, and any questions were answered. Consent was obtained to proceed. Medications Used: etomidate 20 mg intravenously, midazolam 2 mg intravenously, and rocuronium 100 mg intravenously    Procedure: The patient was placed in the appropriate position. Cricoid pressure was not required. Intubation was performed by direct laryngoscopy using a laryngoscope and a 7.5 cuffed endotracheal tube. The cuff was then inflated and the tube was secured appropriately at a distance of 25 cm to the dental ridge. Initial confirmation of placement included bilateral breath sounds, an end tidal CO2 detector, adequate chest rise, and adequate pulse oximetry reading. A chest x-ray to verify correct placement of the tube has been ordered but is still pending. The patient tolerated the procedure well.      Complications: None    David ARIZA-ACNP  Critical Care Nurse Practitioner   [facility] [place]

## 2023-02-12 NOTE — PROGRESS NOTES
Patient Name: Mando Stephens  Patient : 1950  MRN: [id number]     Acct: [de-identified]  Date of Admission: 2023  Room/Bed: 2126/2126-A  Code Status:  Full Code  Allergies:    Allergies   Allergen Reactions    Pcn [Penicillins] Hives    Fentanyl Itching     Diagnosis:    Patient Active Problem List   Diagnosis    PAD (peripheral artery disease) ([facility])    Chronic coronary artery disease    Biventricular ICD (implantable cardioverter-defibrillator) in place    Chronic combined systolic and diastolic heart failure ([facility])    Chronic kidney disease, stage III (moderate) ([facility])    Mixed hyperlipidemia    Sick sinus syndrome ([facility])    Type 2 diabetes mellitus with diabetic polyneuropathy ([facility]    Spinal stenosis of lumbar region    Obesity, Class I, BMI 30-34.9    S/P partial colectomy    Tubulovillous adenoma of colon    Microalbuminuria    WD-PVD (peripheral vascular disease) ([facility])    Limb ischemia    Necrotic toes ([facility])    Toe gangrene ([facility])    Diabetic foot infection ([facility]    Chronic kidney disease (CKD) stage G3a/A2, moderately decreased glomerular filtration rate (GFR) between 45-59 mL/min/1.73 square meter and albuminuria creatinine ratio between  mg/g ([facility])    Edema    ICD (implantable cardioverter-defibrillator) battery depletion    Hyperkalemia    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[facility])    Ischemia of toe    Acute kidney injury ([facility]    Fluid overload    DM (diabetes mellitus) ([facility])    Precordial pain    Acute chest pain    Unstable angina ([facility])    Chronic kidney disease (CKD) stage G3a/A3, moderately decreased glomerular filtration rate (GFR) between 45-59 mL/min/1.73 square meter and albuminuria creatinine ratio greater than 300 mg/g (HCC)    Cardiomyopathy (HCC)    Diabetic neuropathy ([facility])    HTN (hypertension)    Epigastric pain    Acute on chronic congestive heart failure (HCC)    Leg edema    Acute on chronic systolic CHF (congestive heart failure) ([facility])    Diabetic foot ulcer with osteomyelitis ([facility]    Visit for wound check    Moderate malnutrition ([address]    Long term (current) use of antibiotics    WD-Diabetic ulcer of toe of right foot associated with type 2 diabetes mellitus, with fat layer exposed ([address]    Diabetic ulcer of toe associated with type 2 diabetes mellitus, with bone involvement without evidence of necrosis (HCC)    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    Persistent wound pain    Receiving intravenous antibiotic treatment as outpatient    Acute on chronic respiratory failure with hypoxemia ([address]    WD-Chronic foot ulcer, left, with necrosis of bone (HCC)    Acute on chronic HFrEF (heart failure with reduced ejection fraction) (HCC)    Scrotal edema    Hypertensive urgency    Diabetic ulcer of right foot due to type 2 diabetes mellitus (HCC)    Cellulitis of foot    Toe osteomyelitis (HCC)    Heart failure exacerbated by sotalol (HCC)    CHF (congestive heart failure), NYHA class I, acute on chronic, combined (HCC)    Acute on chronic diastolic CHF (congestive heart failure) (HCC)    Leg swelling    Acute on chronic diastolic heart failure (HCC)    Skin ulcer of left foot including toes with fat layer exposed ([address]    Superficial incisional surgical site infection    Bacteremia due to Enterococcus    Acute respiratory failure with hypoxia and hypercapnia (HCC)    Acute kidney injury superimposed on CKD ([address]         Treatment:  Hemodialysis 1:1  Priority: STAT  Location: ICU    Diabetic: Yes  NPO: Yes  Isolation Precautions: Contact     Consent for Treatment Verified: Yes  Blood Consent Verified: Not Applicable     Safety Verified: Identify (I), Consent (C), Equipment (E), HepB Status (B), Orders Complete (O), Access Verified (A), and Timeliness (T)  Time out performed prior to access at 2005 hours. Report Received from Primary RN at 1945 hours. Primary RN (First Initial, Last Name, Title): HEBER Key RN  Incapacitated Nurse Education Completed: Yes     HBsAg ONLY:  Date Drawn: February 11, 2023       Results: Negative  HBsAb:  Date Drawn:  February 11, 2023       Results: Susceptible <10    Order  Dialysis Bath  K+ (Potassium): 2  Ca+ (Calcium): 2.5  Na+ (Sodium): 138  HCO3 (Bicarb): 35  Bicarbonate Concentrate Lot No.: [id number]  Acid Concentrate Lot No.: [id number]     Na+ Modeling: Not Applicable  Dialyzer: [id number]  Dialysate Temperature (C):  35  Blood Flow Rate (BFR):  200   Dialysate Flow Rate (DFR):   400        Access to be Utilized   Access: Non-tunneled Catheter  Location: Internal Jugular  Side: Right   Needle gauge:  Not Applicable  + Bruit/Thrill: Not Applicable    First Use X-ray Verified: Yes  OK to use line order: Yes    Site Assessment:  Signs and Symptoms of Infection/Inflammation: None  If yes: Not Applicable  Dressing: Dry and Intact  Site Prep: Medical Aseptic Technique  Dressing Changed this Treatment: Yes  If yes, by whom: Other  Date of Last Dressing Change: Not Applicable  Antimicrobial Patch in place?: Yes  Red Alcohol Caps in place?: Yes  Gauze Dressing?: No  Non Dialysis Use?: No  Comment:    Flows: Good, Patent  If access problem, who was notified:     Pre and Post-Assessment  Patient Vitals for the past 8 hrs:   Level of Consciousness Heart Rhythm Respiratory Quality/Effort O2 Device Bilateral Breath Sounds Skin Condition/Temp Bowel Sounds (All Quadrants) Edema RLE Edema LLE Edema   02/11/23 1542 1 -- -- -- -- -- -- -- -- --   02/11/23 1545 3 -- Dyspnea at rest -- -- -- -- Right lower extremity; Left lower extremity Pitting Pitting   02/11/23 1707 1 -- -- -- -- -- -- -- -- --   02/11/23 1804 1 -- -- -- -- -- -- -- -- --   02/11/23 1846 3 -- -- -- -- -- -- -- -- --   02/11/23 2000 3 Regular Dyspnea at rest Ventilator Diminished Dry; Cool Audible Right lower extremity; Left lower extremity Pitting Pitting   02/11/23 2014 -- -- -- Ventilator -- -- -- -- -- --   02/11/23 2300 3 Regular Dyspnea at rest Ventilator Diminished Cool;Dry Audible Right lower extremity; Left lower extremity Pitting Pitting Labs  Recent Labs     02/11/23  1047   WBC 4.6   HGB 10.3*   HCT 37.0*                                                                     Recent Labs     02/11/23  1047 02/11/23  1600    139   K 6.5* 6.9*    109   CO2 16* 21   BUN 71* 73*   CREATININE 4.8* 4.8*   GLUCOSE 170* 70     IV Drips and Rate/Dose   dextrose      furosemide (LASIX) 1mg/mL infusion 10 mg/hr (02/11/23 1517)    sodium chloride      propofol 10 mcg/kg/min (02/11/23 1836)      Safety - Before each treatment:   Dialysis Machine No.: [id number]   Machine Number: [id number]  Dialyzer Lot No.: [id number]  RO Machine Log Sheet Completed: Yes  Machine Alarm Self Test: Completed; Passed (02/11/23 2014)     Air Foam Detector: Tested, Proper Function, pH Reading  Extracorporeal Circuit Tested for Integrity: Yes  Machine Conductivity: 13.5  Manual Conductivity: 13.6     Bicarbonate Concentrate Lot No.: [id number]  Acid Concentrate Lot No.: [id number]  Manual Ph: 7.4  Bleach Test (Neg): Yes  Bath Temperature: 95 °F (35 °C)  Tubing Lot#: [id number]  Conductivity Meter Serial #: [id number]  All Connections Secure?: Yes  Venous Parameters Set?: Yes  Arterial Parameters Set?: Yes  Saline Line Double Clamped?: Yes  Air Foam Detector Engaged?: Yes  Machine Functioning Alarm Free?  Yes  Prime Given: 200ml    Chlorine Testing - Before each treatment and every 4 hours:   Treatment  Time On: 2015  Time Off: 2240  Weight: 299 lb 4.8 oz (135.8 kg) (02/11/23 2014)  1st check: less than 0.1 ppm at: 1945 hours  2nd check: less than 0.1 ppm at: NA  3rd check: Not Applicable  (if greater than 0.1 ppm, then check every 30 minutes from secondary)    Access Flows and Pressures  Patient Vitals for the past 8 hrs:   Blood Flow Rate (ml/min) Ultrafiltration Rate (ml/hr) Arterial Pressure (mmHg) Venous Pressure (mmHg) TMP DFR Comments Access Visible   02/11/23 2015 200 ml/min 500 ml/hr -10 mmHg 20 40 400 tx initiated Yes   02/11/23 2030 200 ml/min 500 ml/hr -20 mmHg 30 20 400 lines secure Yes   02/11/23 2045 200 ml/min 500 ml/hr -20 mmHg 100 50 400 no distress Yes   02/11/23 2100 200 ml/min 500 ml/hr -10 mmHg 130 60 400 resting quietly Yes   02/11/23 2115 200 ml/min 0 ml/hr -10 mmHg 210 60 400 notified md Yes   02/11/23 2130 -- -- -- -- -- -- dialyzer clotted --   02/11/23 2150 200 ml/min 0 ml/hr -10 mmHg 40 70 400 tx restarted Yes   02/11/23 2200 200 ml/min 0 ml/hr -10 mmHg 40 60 400 no distress Yes   02/11/23 2215 200 ml/min 0 ml/hr -10 mmHg 50 60 400 no changes Yes   02/11/23 2230 200 ml/min 0 ml/hr -10 mmHg 50 60 400 RN @ bedside Yes   02/11/23 2240 -- -- -- -- -- -- tx ended Yes     Vital Signs  Patient Vitals for the past 8 hrs:   BP Temp Pulse Resp SpO2 Weight Percent Weight Change   02/11/23 1529 -- -- -- 20 -- -- --   02/11/23 1542 118/66 (!) 93 °F (33.9 °C) 60 13 -- -- --   02/11/23 1707 119/65 (!) 92.8 °F (33.8 °C) 63 14 -- -- --   02/11/23 1740 -- (!) 93.2 °F (34 °C) -- -- -- -- --   02/11/23 1804 111/69 (!) 93.6 °F (34.2 °C) 63 22 -- -- --   02/11/23 1846 85/63 (!) 94.1 °F (34.5 °C) 60 16 100 % -- --   02/11/23 1850 88/61 -- 60 18 100 % -- --   02/11/23 1902 (!) 88/59 -- 60 16 100 % -- --   02/11/23 2000 105/71 (!) 95.5 °F (35.3 °C) 60 16 100 % -- --   02/11/23 2014 -- -- 60 18 100 % 299 lb 4.8 oz (135.8 kg) 15.12   02/11/23 2015 129/74 -- 65 22 100 % -- --   02/11/23 2030 115/65 -- 60 23 100 % -- --   02/11/23 2045 86/70 -- 60 15 98 % -- --   02/11/23 2100 (!) 86/51 -- 58 18 96 % -- --   02/11/23 2108 -- -- 60 16 99 % -- --   02/11/23 2115 (!) 88/52 -- 60 16 98 % -- --   02/11/23 2130 (!) 98/56 -- 60 16 99 % -- --   02/11/23 2150 (!) 96/59 -- 60 18 99 % -- --   02/11/23 2200 102/61 -- 60 16 100 % -- --   02/11/23 2215 104/67 -- 61 16 99 % -- --   02/11/23 2230 105/62 -- 62 18 96 % -- --   02/11/23 2240 110/60 -- 63 16 100 % -- --   02/11/23 2300 (!) 114/59 97.3 °F (36.3 °C) 65 17 100 % -- --     Post-Dialysis  Arterial Catheter Locking Solution:  NS  Venous Catheter Locking Solution:  NS  Post-Treatment Procedures: Blood returned, Catheter Capped, clamped with Saline x2 ports  Machine Disinfection Process: Acid/Vinegar Clean, Heat Disinfect, Exterior Machine Disinfection  Rinseback Volume (ml): 300 ml  Blood Volume Processed (Liters): 25.7 l/min  Dialyzer Clearance: Moderately streaked  Duration of Treatment (minutes): 120 minutes     Hemodialysis Intake (ml): 1000 ml  Hemodialysis Output (ml): 351 ml     Tolerated Treatment: Fair  Patient Response to Treatment: fair  Physician Notified: Yes       Provider Notification  Provider Notification  Reason for Communication: Evaluate (02/11/23 2130)  Provider Name: Benny Payan (02/11/23 2130)  Provider Notification: Physician (02/11/23 2130)  Method of Communication: Call (02/11/23 2130)  Response: See orders (02/11/23 2130)  Notification Time: 2120 (02/11/23 2130)     Handoff complete and report given to Primary RN at  hours.   Primary RN (First Initial, Last Name, Title):  Geraldine Falcon RN     Education  Person Educated: Patient   Knowledge Base: None  Barriers to Learning?: Yes, including sedated and vented  Preferred method of Learning: Oral  Topic(s): Access Care, Signs and Symptoms of Infection, and Procedural   Teaching Tools: Explanation   Response to Education: Requires Follow-up     Electronically signed by Siobhan Marie RN on 2/11/2023 at 11:18 PM

## 2023-02-12 NOTE — PROGRESS NOTES
CARDIOLOGY  NOTE        Name:  Anna Castellon /Age/Sex: 1950  (67 y.o. male)   MRN & CSN:  [id number] & [id number] Admission Date/Time: 2023 10:42 AM   Location:  [facility] PCP: Edin Hendricks Day: 2        PLAN FROM CARDIOLOGY FOR TODAY:   Continue supportive care, echocardiogram tomorrow morning      - cardiology consult is for: Elevated troponin    -  Interval history: On vent now    ASSESSMENT/ PLAN:      - cad patient history of known CAD and stents in all 3 arteries and had cardiac cath done in 2018 which showed the stents are patent  Last Cardiolite done in 2020 showed the EF was 48% showed inferior wall MI no ischemia was seen  Patient's troponin is elevated there probably secondary to type II elevation given that the patient creatinine is for now   - acute kidney injury with the newly elevated creatinine to high level per renal CRRT  -hfpef aggressive diuresis and monitor    -icd last ICD check was on 2023 which showed that the OptiVol was normal   -pvd had multiple interventions done last 1 was done in October of last year           Subjective: Todays complain: Patient on vent    HPI:  Curt Waters is a 67 y. o.year old who and presents with had concerns including Respiratory Distress. Chief Complaint   Patient presents with    Respiratory Distress           Objective: Temperature:  Current - Temp: 97.9 °F (36.6 °C);  Max - Temp  Av °F (35.6 °C)  Min: 92.8 °F (33.8 °C)  Max: 99.3 °F (37.4 °C)    Respiratory Rate : Resp  Av.6  Min: 13  Max: 23    Pulse Range: Pulse  Av.6  Min: 58  Max: 67    Blood Presuure Range:  Systolic (22VJR), HCK:399 , Min:85 , NX   ; Diastolic (95HRL), GQY:35, Min:51, Max:78      Pulse ox Range: SpO2  Av.5 %  Min: 96 %  Max: 100 %    24hr I & O:    Intake/Output Summary (Last 24 hours) at 2023 1155  Last data filed at 2023 1132  Gross per 24 hour   Intake 2993.51 ml   Output 544 ml   Net 2449.51 ml         BP (!) 106/58   Pulse 65   Temp 97.9 °F (36.6 °C)   Resp 16   Ht 6' (1.829 m)   Wt (!) 303 lb 2.1 oz (137.5 kg)   SpO2 100%   BMI 41.11 kg/m²           Review of Systems: On vent    TELEMETRY: Atrial paced rhythm   has a past medical history of Acid reflux, Acute MI ([address] Arthritis, Broken teeth, CAD (coronary artery disease), Cardiomyopathy ([address] Cerebral artery occlusion with cerebral infarction ([address] CHF (congestive heart failure) ([address] Chronic back pain, Chronic kidney disease, Diabetes mellitus ([address] Diabetic neuropathy ([address] H/O cardiovascular stress test, H/O Doppler ultrasound, H/O percutaneous left heart catheterization, History of irregular heartbeat, History of syncope, Hyperlipidemia, Hypertension, Leg swelling, Necrotic toes (HCC), Neuropathy, neuropathy, PAD (peripheral artery disease) ([address] PVD (peripheral vascular disease) ([address] Sick sinus syndrome ([address] Sleep apnea, Spinal stenosis, Teeth missing, Type 2 diabetes mellitus without complication ([address] and WD-Chronic foot ulcer, left, with necrosis of bone ([address] has a past surgical history that includes Coronary angioplasty with stent; Dental surgery; Colonoscopy (08/04/2016); pacemaker placement (06/04/2010); vascular surgery; colectomy (Right, 08/26/2016); Toe amputation (Right, 09/12/2017); Toe amputation (Right, 01/09/2018); Cardiac catheterization; Cardiac defibrillator placement (06/04/2010); Coronary angioplasty; Cardiac catheterization (07/14/2017); Cardiac catheterization (11/20/2018); Toe amputation (Left, 12/26/2020); IR TUNNELED CVC PLACE WO SQ PORT/PUMP > 5 YEARS (6/14/2021); Toe amputation (Left, 7/26/2022); Toe amputation (Right, 10/20/2022); and IR TUNNELED CVC PLACE WO SQ PORT/PUMP > 5 YEARS (11/17/2022).   Physical Exam:  General: On vent  Head:normal  Eye: Pupils equal and round  Neck:  No JVD, no carotid bruit noted   Chest:  Clear to auscultation, no signs of respiratory distress  Cardiovascular:  Normal rate and rhythm. S1 and S2 noted. No murmurs rubs or gallops  Abdomen:   nontender  Extremities:  tr edema  Pulses; palpable  Neuro: On vent2    Medications:    sodium chloride flush  5-40 mL IntraVENous 2 times per day    cefTRIAXone (ROCEPHIN) IV  1,000 mg IntraVENous Q24H    And    azithromycin  500 mg IntraVENous Q24H    aspirin  81 mg Oral Daily    clopidogrel  75 mg Oral Daily    ipratropium-albuterol  1 ampule Inhalation Q4H WA    chlorhexidine  15 mL Mouth/Throat BID    famotidine (PEPCID) injection  20 mg IntraVENous Daily      prismaSATE BGK 2/0 1,500 mL/hr at 02/12/23 0934    prismaSATE BGK 4/2.5 200 mL/hr at 02/12/23 0935    prismaSATE BGK 2/0 1,000 mL/hr at 02/12/23 2031    calcium chloride CRRT infusion 8,000 mg (02/12/23 0903)    heparin 5000 units CRRT syringe      norepinephrine      dextrose      sodium chloride      propofol 20 mcg/kg/min (02/12/23 1132)     potassium chloride, magnesium sulfate, calcium gluconate **OR** calcium gluconate **OR** calcium gluconate **OR** calcium gluconate, sodium phosphate IVPB **OR** sodium phosphate IVPB **OR** sodium phosphate IVPB **OR** sodium phosphate IVPB, heparin 5000 units CRRT syringe, glucose, dextrose bolus **OR** dextrose bolus, glucagon (rDNA), dextrose, sodium chloride flush, sodium chloride    Lab Data:  CBC:   Recent Labs     02/11/23  1047   WBC 4.6   HGB 10.3*   HCT 37.0*   MCV 92.3        BMP:   Recent Labs     02/11/23  2240 02/12/23  0715 02/12/23  0933    136 137   K 5.6* 6.4* 5.7*    107 108   CO2 21 21 21   PHOS  --   --  4.9   BUN 61* 66* 62*   CREATININE 4.1* 5.0* 4.6*     LIVER PROFILE:   Recent Labs     02/11/23  1047 02/12/23  0933   AST 19 16   ALT 12 9*   BILITOT 0.5 0.5   ALKPHOS 101 70     PT/INR: No results for input(s): PROTIME, INR in the last 72 hours. APTT: No results for input(s): APTT in the last 72 hours.   BNP:  No results for input(s): BNP in the last 72 hours. TROPONIN: No results for input(s): TROPONINI in the last 72 hours. Recent Labs     02/11/23  1047   TROPONINT 0.107*        Labs, consult, tests reviewed                    Marbella Neely MD, PA-C 2/12/2023 11:55 AM     Please note this report has been partially produced using speech recognition software and may contain errors related to that system including errors in grammar, punctuation, and spelling, as well as words and phrases that may be inappropriate. If there are any questions or concerns please feel free to contact the dictating provider for clarification.

## 2023-02-12 NOTE — PROGRESS NOTES
Nephrology Progress Note  2/12/2023 10:19 AM  Subjective: Interval History: Khadra Kramer is a 67 y.o. male with sedated on the ventilator unable to tolerate hemodialysis very well potassium increased again today we will start on CRRT today        Data:   Scheduled Meds:   sodium zirconium cyclosilicate  10 g Oral TID    sodium chloride flush  5-40 mL IntraVENous 2 times per day    cefTRIAXone (ROCEPHIN) IV  1,000 mg IntraVENous Q24H    And    azithromycin  500 mg IntraVENous Q24H    aspirin  81 mg Oral Daily    clopidogrel  75 mg Oral Daily    ipratropium-albuterol  1 ampule Inhalation Q4H WA    chlorhexidine  15 mL Mouth/Throat BID    famotidine (PEPCID) injection  20 mg IntraVENous Daily     Continuous Infusions:   prismaSATE BGK 2/0 1,500 mL/hr at 02/12/23 0934    prismaSATE BGK 4/2.5 200 mL/hr at 02/12/23 0935    prismaSATE BGK 2/0 1,000 mL/hr at 02/12/23 5414    calcium chloride CRRT infusion 8,000 mg (02/12/23 0903)    heparin 5000 units CRRT syringe      norepinephrine      dextrose      sodium chloride      propofol 20 mcg/kg/min (02/12/23 0904)         CBC   Recent Labs     02/11/23  1047   WBC 4.6   HGB 10.3*   HCT 37.0*         BMP   Recent Labs     02/11/23  1600 02/11/23  2240 02/12/23  0715    139 136   K 6.9* 5.6* 6.4*    110 107   CO2 21 21 21   BUN 73* 61* 66*   CREATININE 4.8* 4.1* 5.0*     Hepatic:   Recent Labs     02/11/23  1047   AST 19   ALT 12   BILITOT 0.5   ALKPHOS 101     Troponin: No results for input(s): TROPONINI in the last 72 hours. BNP: No results for input(s): BNP in the last 72 hours. Lipids: No results for input(s): CHOL, HDL in the last 72 hours. Invalid input(s): LDLCALCU  ABGs:   Lab Results   Component Value Date/Time    PO2ART 89 02/11/2023 08:30 PM    RIY5QHQ 51.0 02/11/2023 08:30 PM     INR: No results for input(s): INR in the last 72 hours.   Renal Labs  Albumin:    Lab Results   Component Value Date/Time    LABALBU 3.3 02/11/2023 10:47 AM Calcium:    Lab Results   Component Value Date/Time    CALCIUM 7.5 02/12/2023 07:15 AM     Phosphorus:    Lab Results   Component Value Date/Time    PHOS 3.7 11/28/2022 10:45 AM     U/A:    Lab Results   Component Value Date/Time    NITRU NEGATIVE 11/11/2022 02:45 PM    COLORU YELLOW 11/11/2022 02:45 PM    PHUR 5.0 08/19/2016 09:38 AM    WBCUA <1 11/11/2022 02:45 PM    RBCUA NONE SEEN 11/11/2022 02:45 PM    MUCUS RARE 11/11/2022 02:45 PM    TRICHOMONAS NONE SEEN 11/11/2022 02:45 PM    BACTERIA NEGATIVE 11/11/2022 02:45 PM    CLARITYU CLEAR 11/11/2022 02:45 PM    SPECGRAV 1.020 11/11/2022 02:45 PM    UROBILINOGEN 0.2 11/11/2022 02:45 PM    BILIRUBINUR NEGATIVE 11/11/2022 02:45 PM    BLOODU NEGATIVE 11/11/2022 02:45 PM    KETUA NEGATIVE 11/11/2022 02:45 PM     ABG:    Lab Results   Component Value Date/Time    COK7ZBN 51.0 02/11/2023 08:30 PM    PO2ART 89 02/11/2023 08:30 PM    WRF7NUF 19.0 02/11/2023 08:30 PM     HgBA1c:    Lab Results   Component Value Date/Time    LABA1C 7.1 10/12/2022 06:28 AM     Microalbumen/Creatinine ratio:  No components found for: RUCREAT  TSH:  No results found for: TSH  IRON:    Lab Results   Component Value Date/Time    IRON 38 10/23/2022 05:53 PM     Iron Saturation:  No components found for: PERCENTFE  TIBC:    Lab Results   Component Value Date/Time    TIBC 217 10/23/2022 05:53 PM     FERRITIN:    Lab Results   Component Value Date/Time    FERRITIN 66 10/23/2022 05:53 PM     RPR:  No results found for: RPR  TIGIST:  No results found for: ANATITER, TIGIST  24 Hour Urine for Creatinine Clearance:  No components found for: CREAT4, UHRS10, UTV10      Objective:   I/O: 02/11 0701 - 02/12 0700  In: 2439.1 [I.V.:1401.9]  Out: 460   I/O last 3 completed shifts: In: 2439.1 [I.V.:1401.9; IV Piggyback:37.2]  Out: 460 [Emesis/NG output:109]  I/O this shift:   In: 489.5 [I.V.:299.6; IV Piggyback:189.8]  Out: -   Vitals: BP (!) 103/58   Pulse 60   Temp 99.3 °F (37.4 °C) (Rectal)   Resp 16   Ht 6' (1.829 m)   Wt (!) 303 lb 2.1 oz (137.5 kg)   SpO2 100%   BMI 41.11 kg/m²  {  General appearance: Sedated ET tube  HEENT: Head: Normal, normocephalic, atraumatic.   Neck: supple, symmetrical, trachea midline  Lungs: diminished breath sounds bilaterally  Heart: S1, S2 normal  Abdomen: abnormal findings:  soft nt  Extremities: edema trace positive HD line  Neurologic: Mental status: alertness: Sedated        Assessment and Plan:      IMP:  #1 hypercarbic respiratory failure  #2 hyperkalemia  #3 acute renal failure on CKD 3  #4 recurrent cellulitis lower extremities with lymphedema  #5 change in mental status  #6 CHF with cardiorenal syndrome  #7 type 2 diabetes    Plan     #1 required intubation and sedation monitor CO2 and above setting supportive care  #2 correct potassium with CRRT is increased after hemodialysis yesterday use 2 potassium bath replete calcium with calcium infusion  #3 renal function not yet improving unable to get a Fuller catheter placed external Fuller no significant urine output urology following as well and try to keep negative balance with dialysis  #4 monitor for possible infection monitor wounds lower extremities  #5 currently sedated on the ventilator monitor adjust medications  #6 in setting of cardiorenal syndrome try to keep negative balance  #7 monitor glucose careful for low sugar may need to adjust with IV nutrition or tube feed  We will follow supportive care we will try to call patient's family             Bryan Rabago MD, MD

## 2023-02-12 NOTE — PROCEDURES
Temporary dialysis catheter procedure Note    Indication: vascular access, need for frequent blood draws, and need for dialysis    Consent: The patient's daughter counseled regarding the procedure, its indications, risks, potential complications and alternatives, and any questions were answered. Consent was obtained to proceed. Procedure: The patient was positioned appropriately and the skin over the right internal jugular vein was prepped with Chloraprep. Local anesthesia was obtained by infiltration using 1% Lidocaine without epinephrine. A large bore needle was used to identify the vein. A guide wire was then inserted into the vein through the needle. A triple lumen catheter was then inserted into the vessel over the guide wire using the Seldinger technique. All ports showed good, free flowing blood return and were flushed with saline solution. The catheter was then securely fastened to the skin with sutures and with an adhesive dressing and covered with a sterile dressing. A post procedure X-ray was ordered and is still pending at this time. The patient tolerated the procedure well.     Complications: None    Von ARIZA-ACNP  Critical Care Nurse Practitioner   [facility] [place]

## 2023-02-12 NOTE — PROGRESS NOTES
V2.0  [facility] [place] Hospitalist Progress Note      Name:  Leslee Jackson /Age/Sex: 1950  (67 y.o. male)   MRN & CSN:  [id number] & [id number] Encounter Date/Time: 2023 1:17 PM EST    Location:  -A PCP: Mao Shin Day: 2    Assessment and Plan:   Leslee Jackson is a 67 y.o. male with pmh of   CAD, HFpEF, ICD, CKD, DM who presents with Acute respiratory failure with hypoxia and hypercapnia ([facility] [address]      Plan:  Acute hypoxic Hypercapneic respiratory failure: was initially placed on BIPAP but later was intubated on MV was started on Empiric broad spectrum Abx with suspicion of Community acquired pneumonia  Acute renal failure with h/o CKD stage 3: was initially started on HD but could not tolerate well so started on CRRT today  Hyperkalemia: in the setting of above, improving  HFpEF:  EF 50-55% in . Repeat echo ordered. Chronic LE wounds: Wound care on board. Does not seem to be infected. Diet Diet NPO   DVT Prophylaxis [] Lovenox, [x]  Heparin, [] SCDs, [] Ambulation,  [] Eliquis, [] Xarelto  [] Coumadin   Code Status Full Code   Disposition From:   Expected Disposition: TBD  Estimated Date of Discharge: TBD  Patient requires continued admission due to acute renal and respiratory failure   Surrogate Decision Maker/ POA      Subjective:     Chief Complaint: Respiratory Distress       Leslee Jackson is a 67 y.o. male who presents with Respiratory distress. Intubated and sedated during my evaluation, family oresent at bedside. Review of Systems:    Review of Systems could not be obtained due to mentation. Objective: Intake/Output Summary (Last 24 hours) at 2023 1317  Last data filed at 2023 1305  Gross per 24 hour   Intake 3009.59 ml   Output 781 ml   Net 2228. 59 ml        Vitals:   Vitals:    23 1300   BP: 118/63   Pulse: 67   Resp: 17   Temp:    SpO2: 100%       Physical Exam:   Physical Exam  Constitutional:       Appearance: He is obese.  He is ill-appearing. HENT:      Head: Normocephalic and atraumatic. Nose: Nose normal.   Eyes:      Extraocular Movements: Extraocular movements intact. Conjunctiva/sclera: Conjunctivae normal.      Pupils: Pupils are equal, round, and reactive to light. Cardiovascular:      Rate and Rhythm: Normal rate and regular rhythm. Pulmonary:      Breath sounds: Rales present. Comments: Coarse breath sounds  Musculoskeletal:      Right lower leg: Edema present. Left lower leg: Edema present. Comments: LE wrapped in dressing   Skin:     General: Skin is warm. Findings: Lesion present. Neurological:      Comments: Intubated and sedated, RASS -2   Psychiatric:      Comments: Could not be assessed.           Medications:   Medications:    sodium chloride flush  5-40 mL IntraVENous 2 times per day    cefTRIAXone (ROCEPHIN) IV  1,000 mg IntraVENous Q24H    And    azithromycin  500 mg IntraVENous Q24H    aspirin  81 mg Oral Daily    [Held by provider] clopidogrel  75 mg Oral Daily    ipratropium-albuterol  1 ampule Inhalation Q4H WA    chlorhexidine  15 mL Mouth/Throat BID    famotidine (PEPCID) injection  20 mg IntraVENous Daily      Infusions:    prismaSATE BGK 2/0 1,500 mL/hr at 02/12/23 1251    prismaSATE BGK 4/2.5 200 mL/hr at 02/12/23 0935    prismaSATE BGK 2/0 1,000 mL/hr at 02/12/23 3675    calcium chloride CRRT infusion 8,000 mg (02/12/23 0903)    heparin 5000 units CRRT syringe      norepinephrine      dextrose      sodium chloride      propofol 20 mcg/kg/min (02/12/23 1240)     PRN Meds: potassium chloride, 20 mEq, PRN  magnesium sulfate, 1,000 mg, PRN  calcium gluconate, 1,000 mg, PRN   Or  calcium gluconate, 2,000 mg, PRN   Or  calcium gluconate, 3,000 mg, PRN   Or  calcium gluconate, 4,000 mg, PRN  sodium phosphate IVPB, 6 mmol, PRN   Or  sodium phosphate IVPB, 12 mmol, PRN   Or  sodium phosphate IVPB, 18 mmol, PRN   Or  sodium phosphate IVPB, 24 mmol, PRN  heparin 5000 units CRRT syringe, , Continuous PRN  glucose, 4 tablet, PRN  dextrose bolus, 125 mL, PRN   Or  dextrose bolus, 250 mL, PRN  glucagon (rDNA), 1 mg, PRN  dextrose, , Continuous PRN  sodium chloride flush, 10 mL, PRN  sodium chloride, , PRN        Labs      Recent Results (from the past 24 hour(s))   Basic Metabolic Panel w/ Reflex to MG    Collection Time: 02/11/23  4:00 PM   Result Value Ref Range    Sodium 139 135 - 145 MMOL/L    Potassium 6.9 (HH) 3.5 - 5.1 MMOL/L    Chloride 109 99 - 110 mMol/L    CO2 21 21 - 32 MMOL/L    Anion Gap 9 4 - 16    BUN 73 (H) 6 - 23 MG/DL    Creatinine 4.8 (H) 0.9 - 1.3 MG/DL    Est, Glom Filt Rate 12 (L) >60 mL/min/1.73m2    Glucose 70 70 - 99 MG/DL    Calcium 8.2 (L) 8.3 - 10.6 MG/DL   POCT Glucose    Collection Time: 02/11/23  4:40 PM   Result Value Ref Range    POC Glucose 199 (H) 70 - 99 MG/DL   Blood gas, arterial    Collection Time: 02/11/23  5:30 PM   Result Value Ref Range    pH, Bld 7.10 (L) 7.34 - 7.45    pCO2, Arterial 61.0 (H) 32 - 45 MMHG    pO2, Arterial 114 (H) 75 - 100 MMHG    Base Excess 11 (H) 0 - 3.3    HCO3, Arterial 18.9 18 - 23 MMOL/L    CO2 Content 20.8 19 - 24 MMOL/L    O2 Sat 94.4 (L) 96 - 97 %    Carbon Monoxide, Blood 4.0 0 - 5 %    Methemoglobin, Arterial 0.6 <1.5 %    Comment 20/8 40%    Blood gas, arterial    Collection Time: 02/11/23  8:30 PM   Result Value Ref Range    pH, Bld 7.18 (L) 7.34 - 7.45    pCO2, Arterial 51.0 (H) 32 - 45 MMHG    pO2, Arterial 89 75 - 100 MMHG    Base Excess 10 (H) 0 - 3.3    HCO3, Arterial 19.0 18 - 23 MMOL/L    CO2 Content 20.6 19 - 24 MMOL/L    O2 Sat 93.5 (L) 96 - 97 %    Carbon Monoxide, Blood 3.4 0 - 5 %    Methemoglobin, Arterial 1.2 <1.5 %    Comment AC/ 16 100% +5    POCT Glucose    Collection Time: 02/11/23  8:54 PM   Result Value Ref Range    POC Glucose 78 70 - 99 MG/DL   Basic Metabolic Panel    Collection Time: 02/11/23 10:40 PM   Result Value Ref Range    Sodium 139 135 - 145 MMOL/L    Potassium 5.6 (HH) 3.5 - 5.1  MMOL/L    Chloride 110 99 - 110 mMol/L    CO2 21 21 - 32 MMOL/L    Anion Gap 8 4 - 16    BUN 61 (H) 6 - 23 MG/DL    Creatinine 4.1 (H) 0.9 - 1.3 MG/DL    Est, Glom Filt Rate 15 (L) >60 mL/min/1.73m2    Glucose 61 (L) 70 - 99 MG/DL    Calcium 7.8 (L) 8.3 - 10.6 MG/DL   POCT Glucose    Collection Time: 02/12/23 12:54 AM   Result Value Ref Range    POC Glucose 75 70 - 99 MG/DL   Basic Metabolic Panel w/ Reflex to MG    Collection Time: 02/12/23  7:15 AM   Result Value Ref Range    Sodium 136 135 - 145 MMOL/L    Potassium 6.4 (HH) 3.5 - 5.1 MMOL/L    Chloride 107 99 - 110 mMol/L    CO2 21 21 - 32 MMOL/L    Anion Gap 8 4 - 16    BUN 66 (H) 6 - 23 MG/DL    Creatinine 5.0 (H) 0.9 - 1.3 MG/DL    Est, Glom Filt Rate 12 (L) >60 mL/min/1.73m2    Glucose 62 (L) 70 - 99 MG/DL    Calcium 7.5 (L) 8.3 - 10.6 MG/DL   POCT Glucose    Collection Time: 02/12/23  8:48 AM   Result Value Ref Range    POC Glucose 70 70 - 99 MG/DL   Comprehensive Metabolic Panel w/ Reflex to MG    Collection Time: 02/12/23  9:33 AM   Result Value Ref Range    Sodium 137 135 - 145 MMOL/L    Potassium 5.7 (HH) 3.5 - 5.1 MMOL/L    Chloride 108 99 - 110 mMol/L    CO2 21 21 - 32 MMOL/L    BUN 62 (H) 6 - 23 MG/DL    Creatinine 4.6 (H) 0.9 - 1.3 MG/DL    Est, Glom Filt Rate 13 (L) >60 mL/min/1.73m2    Glucose 59 (L) 70 - 99 MG/DL    Calcium 6.9 (LL) 8.3 - 10.6 MG/DL    Albumin 2.5 (L) 3.4 - 5.0 GM/DL    Total Protein 5.5 (L) 6.4 - 8.2 GM/DL    Total Bilirubin 0.5 0.0 - 1.0 MG/DL    ALT 9 (L) 10 - 40 U/L    AST 16 15 - 37 IU/L    Alkaline Phosphatase 70 40 - 128 IU/L    Anion Gap 8 4 - 16   Magnesium    Collection Time: 02/12/23  9:33 AM   Result Value Ref Range    Magnesium 2.0 1.8 - 2.4 mg/dl   Phosphorus    Collection Time: 02/12/23  9:33 AM   Result Value Ref Range    Phosphorus 4.9 2.5 - 4.9 MG/DL   POCT Glucose    Collection Time: 02/12/23 11:20 AM   Result Value Ref Range    POC Glucose 78 70 - 99 MG/DL        Imaging/Diagnostics Last 24 Hours   XR CHEST PORTABLE    Result Date: 2/11/2023  EXAMINATION: ONE XRAY VIEW OF THE CHEST 2/11/2023 7:29 pm COMPARISON: Chest radiograph 02/11/2023 HISTORY: ORDERING SYSTEM PROVIDED HISTORY: ETT placement TECHNOLOGIST PROVIDED HISTORY: Reason for exam:->ETT placement Reason for Exam: et and og tube placement confirmation Additional signs and symptoms: et and og tube placement confrimation FINDINGS: Lines and tubes: -ETT terminates at the superior margin of the clavicles. -enteric tube takes a the subdiaphragmatic course and terminates out of the field of view -right-sided Cordis catheter, which terminates within the mid/upper SVC Lungs: There is indistinctness of the pulmonary vasculature with patchy opacities within the right greater than left lungs. . Pleura: Small right-sided pleural effusion. Cardiomediastinal silhouette: Enlarged cardiac silhouette. Bones: No acute osseous findings. Soft tissues: Left-sided 2 lead cardiac device. Lines and tubes as above. The ETT terminates at the level of the superior margin of the clavicles. Grossly unchanged pulmonary exam.  Findings favor cardiogenic pulmonary edema. However a superimposed infectious process cannot be excluded. XR CHEST PORTABLE    Result Date: 2/11/2023  EXAMINATION: ONE XRAY VIEW OF THE CHEST 2/11/2023 11:04 am COMPARISON: 11/11/2022 HISTORY: ORDERING SYSTEM PROVIDED HISTORY: SOB TECHNOLOGIST PROVIDED HISTORY: Reason for exam:->SOB Reason for Exam: SOB FINDINGS: Cardiomegaly, pulmonary vascular congestion/edema, consolidating infiltrative changes mid-basilar regions bilaterally worse on the right. Bibasilar pleural effusions suggested. Findings are consistent with congestive heart failure and/or bibasilar pneumonia. No pneumothorax. Findings consistent with congestive heart failure and/or bilateral mid-basilar pneumonia. Small bibasilar pleural effusions suggested. Findings are generally worse on the right.        Electronically signed by Ramana James MD on 2/12/2023 at 1:17 PM

## 2023-02-13 ENCOUNTER — APPOINTMENT (OUTPATIENT)
Dept: CT IMAGING | Age: 73
DRG: 981 | End: 2023-02-13
Payer: MEDICARE

## 2023-02-13 PROBLEM — E11.8 DIABETIC FOOT (HCC): Status: ACTIVE | Noted: 2023-02-13

## 2023-02-13 PROBLEM — L97.405: Status: ACTIVE | Noted: 2023-02-13

## 2023-02-13 PROBLEM — E08.621: Status: ACTIVE | Noted: 2023-02-13

## 2023-02-13 LAB
ALBUMIN SERPL-MCNC: 2.2 GM/DL (ref 3.4–5)
ALBUMIN SERPL-MCNC: 2.5 GM/DL (ref 3.4–5)
ALP BLD-CCNC: 71 IU/L (ref 40–128)
ALP BLD-CCNC: 76 IU/L (ref 40–128)
ALT SERPL-CCNC: 10 U/L (ref 10–40)
ALT SERPL-CCNC: 9 U/L (ref 10–40)
ANION GAP SERPL CALCULATED.3IONS-SCNC: 7 MMOL/L (ref 4–16)
ANION GAP SERPL CALCULATED.3IONS-SCNC: 8 MMOL/L (ref 4–16)
AST SERPL-CCNC: 18 IU/L (ref 15–37)
AST SERPL-CCNC: 18 IU/L (ref 15–37)
BACTERIA: NEGATIVE /HPF
BASOPHILS ABSOLUTE: 0.1 K/CU MM
BASOPHILS RELATIVE PERCENT: 1.1 % (ref 0–1)
BILIRUB SERPL-MCNC: 0.7 MG/DL (ref 0–1)
BILIRUB SERPL-MCNC: 0.7 MG/DL (ref 0–1)
BILIRUBIN URINE: ABNORMAL MG/DL
BLOOD, URINE: NEGATIVE
BUN SERPL-MCNC: 28 MG/DL (ref 6–23)
BUN SERPL-MCNC: 38 MG/DL (ref 6–23)
CALCIUM IONIZED: 4.44 MG/DL (ref 4.48–5.28)
CALCIUM IONIZED: 4.56 MG/DL (ref 4.48–5.28)
CALCIUM IONIZED: 4.6 MG/DL (ref 4.48–5.28)
CALCIUM IONIZED: 4.68 MG/DL (ref 4.48–5.28)
CALCIUM SERPL-MCNC: 7.7 MG/DL (ref 8.3–10.6)
CALCIUM SERPL-MCNC: 7.8 MG/DL (ref 8.3–10.6)
CHLORIDE BLD-SCNC: 105 MMOL/L (ref 99–110)
CHLORIDE BLD-SCNC: 106 MMOL/L (ref 99–110)
CLARITY: CLEAR
CO2: 24 MMOL/L (ref 21–32)
CO2: 25 MMOL/L (ref 21–32)
COLOR: YELLOW
CREAT SERPL-MCNC: 2.6 MG/DL (ref 0.9–1.3)
CREAT SERPL-MCNC: 3.3 MG/DL (ref 0.9–1.3)
DIFFERENTIAL TYPE: ABNORMAL
EOSINOPHILS ABSOLUTE: 0.1 K/CU MM
EOSINOPHILS RELATIVE PERCENT: 1.5 % (ref 0–3)
GFR SERPL CREATININE-BSD FRML MDRD: 19 ML/MIN/1.73M2
GFR SERPL CREATININE-BSD FRML MDRD: 25 ML/MIN/1.73M2
GLUCOSE BLD-MCNC: 67 MG/DL (ref 70–99)
GLUCOSE BLD-MCNC: 82 MG/DL (ref 70–99)
GLUCOSE SERPL-MCNC: 66 MG/DL (ref 70–99)
GLUCOSE SERPL-MCNC: 79 MG/DL (ref 70–99)
GLUCOSE, URINE: NEGATIVE MG/DL
HCT VFR BLD CALC: 26.4 % (ref 42–52)
HEMOGLOBIN: 7.9 GM/DL (ref 13.5–18)
HYALINE CASTS: >20 /LPF
IMMATURE NEUTROPHIL %: 0.2 % (ref 0–0.43)
IONIZED CA: 1.11 MMOL/L (ref 1.12–1.32)
IONIZED CA: 1.14 MMOL/L (ref 1.12–1.32)
IONIZED CA: 1.15 MMOL/L (ref 1.12–1.32)
IONIZED CA: 1.17 MMOL/L (ref 1.12–1.32)
KETONES, URINE: ABNORMAL MG/DL
L PNEUMO AG UR QL IA: NEGATIVE
LACTATE: 1.5 MMOL/L (ref 0.5–1.9)
LEUKOCYTE ESTERASE, URINE: ABNORMAL
LV EF: 55 %
LVEF MODALITY: NORMAL
LYMPHOCYTES ABSOLUTE: 1.1 K/CU MM
LYMPHOCYTES RELATIVE PERCENT: 22.7 % (ref 24–44)
MAGNESIUM: 1.8 MG/DL (ref 1.8–2.4)
MAGNESIUM: 1.9 MG/DL (ref 1.8–2.4)
MCH RBC QN AUTO: 25.6 PG (ref 27–31)
MCHC RBC AUTO-ENTMCNC: 29.9 % (ref 32–36)
MCV RBC AUTO: 85.7 FL (ref 78–100)
MONOCYTES ABSOLUTE: 0.6 K/CU MM
MONOCYTES RELATIVE PERCENT: 12 % (ref 0–4)
MUCUS: ABNORMAL HPF
NITRITE URINE, QUANTITATIVE: NEGATIVE
NUCLEATED RBC %: 1.1 %
PDW BLD-RTO: 18.2 % (ref 11.7–14.9)
PH, URINE: 5 (ref 5–8)
PHOSPHORUS: 3.1 MG/DL (ref 2.5–4.9)
PHOSPHORUS: 3.4 MG/DL (ref 2.5–4.9)
PLATELET # BLD: 119 K/CU MM (ref 140–440)
PMV BLD AUTO: 9.9 FL (ref 7.5–11.1)
POTASSIUM SERPL-SCNC: 4.2 MMOL/L (ref 3.5–5.1)
POTASSIUM SERPL-SCNC: 4.4 MMOL/L (ref 3.5–5.1)
PROTEIN UA: 30 MG/DL
RBC # BLD: 3.08 M/CU MM (ref 4.6–6.2)
RBC URINE: 4 /HPF (ref 0–3)
S PNEUM AG CSF QL: NORMAL
SEGMENTED NEUTROPHILS ABSOLUTE COUNT: 2.9 K/CU MM
SEGMENTED NEUTROPHILS RELATIVE PERCENT: 62.5 % (ref 36–66)
SODIUM BLD-SCNC: 137 MMOL/L (ref 135–145)
SODIUM BLD-SCNC: 138 MMOL/L (ref 135–145)
SPECIFIC GRAVITY UA: 1.02 (ref 1–1.03)
SPERM: ABNORMAL /HFP
SQUAMOUS EPITHELIAL: 7 /HPF
TOTAL IMMATURE NEUTOROPHIL: 0.01 K/CU MM
TOTAL NUCLEATED RBC: 0.1 K/CU MM
TOTAL PROTEIN: 5.1 GM/DL (ref 6.4–8.2)
TOTAL PROTEIN: 5.6 GM/DL (ref 6.4–8.2)
TRICHOMONAS: ABNORMAL /HPF
UROBILINOGEN, URINE: 1 MG/DL (ref 0.2–1)
WBC # BLD: 4.7 K/CU MM (ref 4–10.5)
WBC UA: <1 /HPF (ref 0–2)

## 2023-02-13 PROCEDURE — 87899 AGENT NOS ASSAY W/OPTIC: CPT

## 2023-02-13 PROCEDURE — 2000000000 HC ICU R&B

## 2023-02-13 PROCEDURE — 2700000000 HC OXYGEN THERAPY PER DAY

## 2023-02-13 PROCEDURE — 80053 COMPREHEN METABOLIC PANEL: CPT

## 2023-02-13 PROCEDURE — 6370000000 HC RX 637 (ALT 250 FOR IP): Performed by: NURSE PRACTITIONER

## 2023-02-13 PROCEDURE — 83735 ASSAY OF MAGNESIUM: CPT

## 2023-02-13 PROCEDURE — 51798 US URINE CAPACITY MEASURE: CPT

## 2023-02-13 PROCEDURE — 0T9B70Z DRAINAGE OF BLADDER WITH DRAINAGE DEVICE, VIA NATURAL OR ARTIFICIAL OPENING: ICD-10-PCS | Performed by: SPECIALIST

## 2023-02-13 PROCEDURE — 2500000003 HC RX 250 WO HCPCS: Performed by: INTERNAL MEDICINE

## 2023-02-13 PROCEDURE — 93308 TTE F-UP OR LMTD: CPT

## 2023-02-13 PROCEDURE — 84100 ASSAY OF PHOSPHORUS: CPT

## 2023-02-13 PROCEDURE — 36415 COLL VENOUS BLD VENIPUNCTURE: CPT

## 2023-02-13 PROCEDURE — 70450 CT HEAD/BRAIN W/O DYE: CPT

## 2023-02-13 PROCEDURE — 99213 OFFICE O/P EST LOW 20 MIN: CPT

## 2023-02-13 PROCEDURE — 6360000002 HC RX W HCPCS: Performed by: NURSE PRACTITIONER

## 2023-02-13 PROCEDURE — 51702 INSERT TEMP BLADDER CATH: CPT

## 2023-02-13 PROCEDURE — 2580000003 HC RX 258: Performed by: INTERNAL MEDICINE

## 2023-02-13 PROCEDURE — 94003 VENT MGMT INPAT SUBQ DAY: CPT

## 2023-02-13 PROCEDURE — 2720000010 HC SURG SUPPLY STERILE

## 2023-02-13 PROCEDURE — 82962 GLUCOSE BLOOD TEST: CPT

## 2023-02-13 PROCEDURE — 74176 CT ABD & PELVIS W/O CONTRAST: CPT

## 2023-02-13 PROCEDURE — 94761 N-INVAS EAR/PLS OXIMETRY MLT: CPT

## 2023-02-13 PROCEDURE — 99233 SBSQ HOSP IP/OBS HIGH 50: CPT | Performed by: INTERNAL MEDICINE

## 2023-02-13 PROCEDURE — 87449 NOS EACH ORGANISM AG IA: CPT

## 2023-02-13 PROCEDURE — 2500000003 HC RX 250 WO HCPCS: Performed by: NURSE PRACTITIONER

## 2023-02-13 PROCEDURE — 82330 ASSAY OF CALCIUM: CPT

## 2023-02-13 PROCEDURE — 85025 COMPLETE CBC W/AUTO DIFF WBC: CPT

## 2023-02-13 PROCEDURE — 36592 COLLECT BLOOD FROM PICC: CPT

## 2023-02-13 PROCEDURE — 81003 URINALYSIS AUTO W/O SCOPE: CPT

## 2023-02-13 PROCEDURE — 83605 ASSAY OF LACTIC ACID: CPT

## 2023-02-13 PROCEDURE — 89220 SPUTUM SPECIMEN COLLECTION: CPT

## 2023-02-13 PROCEDURE — 94640 AIRWAY INHALATION TREATMENT: CPT

## 2023-02-13 PROCEDURE — 2580000003 HC RX 258: Performed by: NURSE PRACTITIONER

## 2023-02-13 PROCEDURE — 90945 DIALYSIS ONE EVALUATION: CPT

## 2023-02-13 PROCEDURE — 99291 CRITICAL CARE FIRST HOUR: CPT | Performed by: SURGERY

## 2023-02-13 RX ORDER — DEXTROSE MONOHYDRATE 100 MG/ML
INJECTION, SOLUTION INTRAVENOUS CONTINUOUS
Status: DISCONTINUED | OUTPATIENT
Start: 2023-02-13 | End: 2023-02-15

## 2023-02-13 RX ORDER — HEPARIN SODIUM 5000 [USP'U]/.5ML
5000 INJECTION, SOLUTION INTRAVENOUS; SUBCUTANEOUS EVERY 8 HOURS
Status: DISCONTINUED | OUTPATIENT
Start: 2023-02-13 | End: 2023-02-13

## 2023-02-13 RX ORDER — SODIUM HYPOCHLORITE 1.25 MG/ML
SOLUTION TOPICAL DAILY
Status: DISCONTINUED | OUTPATIENT
Start: 2023-02-13 | End: 2023-03-07 | Stop reason: HOSPADM

## 2023-02-13 RX ORDER — FENTANYL CITRATE 50 UG/ML
50 INJECTION, SOLUTION INTRAMUSCULAR; INTRAVENOUS
Status: DISCONTINUED | OUTPATIENT
Start: 2023-02-13 | End: 2023-02-21

## 2023-02-13 RX ORDER — HEPARIN SODIUM 5000 [USP'U]/ML
5000 INJECTION, SOLUTION INTRAVENOUS; SUBCUTANEOUS EVERY 8 HOURS SCHEDULED
Status: DISCONTINUED | OUTPATIENT
Start: 2023-02-13 | End: 2023-03-07 | Stop reason: HOSPADM

## 2023-02-13 RX ORDER — MIDAZOLAM HYDROCHLORIDE 2 MG/2ML
1 INJECTION, SOLUTION INTRAMUSCULAR; INTRAVENOUS
Status: DISCONTINUED | OUTPATIENT
Start: 2023-02-13 | End: 2023-02-20

## 2023-02-13 RX ADMIN — CALCIUM CHLORIDE 8000 MG: 100 INJECTION, SOLUTION INTRAVENOUS at 14:47

## 2023-02-13 RX ADMIN — PROPOFOL 40 MCG/KG/MIN: 10 INJECTION, EMULSION INTRAVENOUS at 21:45

## 2023-02-13 RX ADMIN — Medication: at 16:45

## 2023-02-13 RX ADMIN — SODIUM CHLORIDE, PRESERVATIVE FREE 10 ML: 5 INJECTION INTRAVENOUS at 08:55

## 2023-02-13 RX ADMIN — IPRATROPIUM BROMIDE AND ALBUTEROL SULFATE 1 AMPULE: 2.5; .5 SOLUTION RESPIRATORY (INHALATION) at 16:04

## 2023-02-13 RX ADMIN — SODIUM CHLORIDE, PRESERVATIVE FREE 20 MG: 5 INJECTION INTRAVENOUS at 08:55

## 2023-02-13 RX ADMIN — Medication: at 13:20

## 2023-02-13 RX ADMIN — PROPOFOL 20 MCG/KG/MIN: 10 INJECTION, EMULSION INTRAVENOUS at 04:26

## 2023-02-13 RX ADMIN — DEXTROSE MONOHYDRATE: 100 INJECTION, SOLUTION INTRAVENOUS at 15:53

## 2023-02-13 RX ADMIN — CHLORHEXIDINE GLUCONATE 0.12% ORAL RINSE 15 ML: 1.2 LIQUID ORAL at 08:55

## 2023-02-13 RX ADMIN — CHLORHEXIDINE GLUCONATE 0.12% ORAL RINSE 15 ML: 1.2 LIQUID ORAL at 20:38

## 2023-02-13 RX ADMIN — Medication: at 06:26

## 2023-02-13 RX ADMIN — Medication: at 02:57

## 2023-02-13 RX ADMIN — Medication: at 09:44

## 2023-02-13 RX ADMIN — SODIUM CHLORIDE, PRESERVATIVE FREE 10 ML: 5 INJECTION INTRAVENOUS at 20:38

## 2023-02-13 RX ADMIN — IPRATROPIUM BROMIDE AND ALBUTEROL SULFATE 1 AMPULE: 2.5; .5 SOLUTION RESPIRATORY (INHALATION) at 11:29

## 2023-02-13 RX ADMIN — Medication: at 06:16

## 2023-02-13 RX ADMIN — Medication: at 10:18

## 2023-02-13 RX ADMIN — Medication: at 16:01

## 2023-02-13 RX ADMIN — CALCIUM CHLORIDE 8000 MG: 100 INJECTION, SOLUTION INTRAVENOUS at 01:35

## 2023-02-13 RX ADMIN — Medication: at 20:10

## 2023-02-13 RX ADMIN — HEPARIN SODIUM 5000 UNITS: 5000 INJECTION INTRAVENOUS; SUBCUTANEOUS at 22:26

## 2023-02-13 RX ADMIN — IPRATROPIUM BROMIDE AND ALBUTEROL SULFATE 1 AMPULE: 2.5; .5 SOLUTION RESPIRATORY (INHALATION) at 22:30

## 2023-02-13 RX ADMIN — HEPARIN SODIUM 5000 UNITS: 5000 INJECTION INTRAVENOUS; SUBCUTANEOUS at 12:37

## 2023-02-13 RX ADMIN — PROPOFOL 25 MCG/KG/MIN: 10 INJECTION, EMULSION INTRAVENOUS at 17:37

## 2023-02-13 RX ADMIN — Medication: at 23:09

## 2023-02-13 RX ADMIN — PROPOFOL 25 MCG/KG/MIN: 10 INJECTION, EMULSION INTRAVENOUS at 12:42

## 2023-02-13 RX ADMIN — Medication: at 10:16

## 2023-02-13 RX ADMIN — Medication: at 00:50

## 2023-02-13 RX ADMIN — IPRATROPIUM BROMIDE AND ALBUTEROL SULFATE 1 AMPULE: 2.5; .5 SOLUTION RESPIRATORY (INHALATION) at 08:09

## 2023-02-13 ASSESSMENT — PULMONARY FUNCTION TESTS
PIF_VALUE: 20
PIF_VALUE: 20
PIF_VALUE: 22
PIF_VALUE: 19
PIF_VALUE: 23
PIF_VALUE: 21
PIF_VALUE: 8
PIF_VALUE: 22
PIF_VALUE: 22
PIF_VALUE: 23
PIF_VALUE: 23
PIF_VALUE: 25
PIF_VALUE: 23
PIF_VALUE: 19
PIF_VALUE: 26
PIF_VALUE: 23
PIF_VALUE: 20
PIF_VALUE: 21
PIF_VALUE: 25
PIF_VALUE: 20
PIF_VALUE: 22
PIF_VALUE: 20
PIF_VALUE: 31
PIF_VALUE: 20
PIF_VALUE: 21
PIF_VALUE: 29
PIF_VALUE: 23
PIF_VALUE: 25
PIF_VALUE: 25

## 2023-02-13 ASSESSMENT — PAIN SCALES - GENERAL
PAINLEVEL_OUTOF10: 0

## 2023-02-13 NOTE — CARE COORDINATION
Attempted assessment. Patient is intubated with CRRT in progress. No family present. Will complete assessment when family is present. Ileana Raygoza RN     4220 Met with patient's daughter/son and niece at bedside. Discussed CM role and PLOF. Patient is from home with family; needs some assistance with ADLs. He has a hospital bed (daughter purchased outright- no insurance involvement) walker, cane, wheelchair, scooter, shower chair and home O2. Patient has a PCP and insurance that assists wit Rx when needed. Provided family with CM card ; will follow.  Ileana Raygoza RN

## 2023-02-13 NOTE — PROGRESS NOTES
Aparna Lang CNP and WARD Grubbs at bedside with Urology and placed 12 Fr parkinson with urine returned.

## 2023-02-13 NOTE — PROGRESS NOTES
CKST (continuous kidney  supportive therapy )note \       day 2        Pt seen on CKST  .  [place]    Access :  right IJ temporary dialysis catheter    BP : 215/55    Blood flow:  5550 ml/min     Pressor :  norepinephrine     EF(effluent flow)  :  21 ml/kg/h  UFR(ultrafiltration rate) :  9 ml/kg/h    FF(filtration fraction)  : 24  %     AC(anticoagulation) :  none yet

## 2023-02-13 NOTE — PROGRESS NOTES
Nephrology Progress Note  2/13/2023 12:39 PM        Subjective:   Admit Date: 2/11/2023  PCP: Timoteo Taylor    Interval History: Patient seen in early morning, this is a late entry. Events leading up to and since admission briefly reviewed    Diet: None    ROS: On mechanical ventilation, assist control mode, FiO2 40% and PEEP of 5  With norepinephrine  Also with continuous dialysis  Urine output very minimal about couple of 100 cc a day  Sedated with propofol      PAST MEDICAL HISTORY:  1.  CKD stage IIIb/4  A3 with several acute kidney injuries, mainly by  creatinine criteria of course. 2.  Cardiorenal syndrome type 2 frequently transforming to type 1, but  has not been doing it since 12/2020. His diabetes mellitus is  longstanding, it was not very well controlled, had significant  proteinuria. 3.  Hypertension, also was not very well controlled. 4.  Coronary artery disease. He has had several stents long time ago by  Dr. Lizet Valentin. He also had an ICD placement. His EF was about 45% with most recent left ventricular ejection fraction is 55% on his October 12, 5530, diastolic dysfunction-severe dilated right ventricle with severe tricuspid regurgitation  unless there is any recent echocardiogram done. 5.  Severe atherosclerotic cardiovascular disease involving several  vascular beds, mainly the lower extremity too. He had angiogram  intervention and toe amputation as I mentioned earlier. He had angiogram and intervention and to amputation in October of this year  6. Chronic leg edema, which is pretty much minimal now. 7.  Probable autonomic dysfunction. He did have some orthostatic  hypotension before, although it could be multifactorial.  8.  Type 2 diabetes mellitus with proteinuria     PAST SURGICAL HISTORY:  1. 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 2.  Angiogram several of them. 3.  Pacemaker and ICD placement.   4.  Coronary angiogram too, had intervention in the past.     FAMILY HISTORY: Coronary artery disease runs in the family. Nobody has  advanced kidney disease or is on dialysis. SOCIAL HISTORY:  The patient is . He was born in [facility],  where he moved to [place] for a while and back here since, I  think, in 2016. He does have some extended family. He lives alone at  home. He does probably have some home healthcare. He does have an  electrical scooter. I am assuming that he has some social support, but  may be I am wrong, I need to double check with him. HABITS:  He does not smoke. No history of alcohol or illicit drug  abuse. Data:     Current meds:    heparin (porcine)  5,000 Units SubCUTAneous 3 times per day    sodium chloride flush  5-40 mL IntraVENous 2 times per day    aspirin  81 mg Oral Daily    [Held by provider] clopidogrel  75 mg Oral Daily    ipratropium-albuterol  1 ampule Inhalation Q4H WA    chlorhexidine  15 mL Mouth/Throat BID    famotidine (PEPCID) injection  20 mg IntraVENous Daily      prismaSATE BGK 2/0 1,500 mL/hr at 02/13/23 0944    prismaSATE BGK 4/2.5 200 mL/hr at 02/13/23 1018    prismaSATE BGK 2/0 1,000 mL/hr at 02/13/23 1016    calcium chloride CRRT infusion 80 mL/hr at 02/13/23 0947    heparin 5000 units CRRT syringe      norepinephrine 1 mcg/min (02/13/23 1126)    dextrose      sodium chloride      propofol 20 mcg/kg/min (02/13/23 0711)         I/O last 3 completed shifts:   In: 3354.3 [I.V.:1837.8; NG/GT:30; IV Piggyback:486.5]  Out: 4606 [Emesis/NG output:304]    CBC:   Recent Labs     02/11/23  1047 02/13/23  0845   WBC 4.6 4.7   HGB 10.3* 7.9*    119*          Recent Labs     02/12/23  0933 02/12/23  1907 02/13/23  0707    136 137   K 5.7* 5.1 4.4    105 105   CO2 21 23 24   BUN 62* 49* 38*   CREATININE 4.6* 3.9* 3.3*   GLUCOSE 59* 72 66*       Lab Results   Component Value Date    CALCIUM 7.7 (L) 02/13/2023    PHOS 3.4 02/13/2023       Objective:     Vitals: BP (!) 109/58   Pulse 60   Temp 96.8 °F (36 °C) (Rectal)   Resp 17   Ht 6' (1.829 m)   Wt 298 lb 8.1 oz (135.4 kg)   SpO2 100%   BMI 40.48 kg/m² ,    General appearance: Intubated, sedated  HEENT: Striking 3+ conjunctival pallor  Neck: Right IJ temporary dialysis catheter  Lungs: Limited anterior exam, some adventitious breath sound  Heart: Seemed regular rate and rhythm, left chest wall implanted cardiac device  Abdomen: Soft  Extremities: Bilateral leg edema and chronic wound they are wrapped up  Also has a Fuller catheter      Problem List :         Impression :     Stage III acute kidney injury with underlying CKD stage IV A3-oliguric likely from septic shock-with associated ischemic and toxic tubular injury  Septic shock with multiorgan dysfunction-skin soft tissue and bone infection are suspected  Likely acute decompensated heart failure with underlying atherosclerotic cardiovascular disease and cardiomyopathy  Underlying diabetes and several sequela of chronic medical condition    Recommendation/Plan  :     He is on continuous dialysis for now-we will keep it on and adjust ultrafiltration-goal would be to get him close to normovolemia-resolution of pulmonary edema-which in turn might help to extubate him-also look for the precise source for infection and appropriate treatment-if he does have some infection of course-good glycemic control-watch for iatrogenic and nosocomial complication-follow clinically and biochemically      Rachid Mauro MD MD

## 2023-02-13 NOTE — PROGRESS NOTES
V2.0  [facility] [facility]      Name:  Kenan Ferguson /Age/Sex: 1950  (67 y.o. male)   MRN & CSN:  [id number] & [id number] Encounter Date/Time: 2023 6:51 PM EST    Location:  -A PCP: Edmundo Lee Day: 3    Assessment and Plan:   Kenan Ferguson is a 67 y.o. male with pmh of  CAD, HFpEF, ICD, CKD, DM who presents with Acute respiratory failure with hypoxia and hypercapnia ([facility] [address]      Plan:  Acute hypoxic Hypercapneic respiratory failure: was initially placed on BIPAP but later was intubated on MV was started on Empiric broad spectrum Abx with suspicion of Community acquired pneumonia, discontinued today  Acute renal failure with h/o CKD stage 3: was initially started on HD but could not tolerate well so started on CRRT today  Hyperkalemia: in the setting of above, improving  Elevated troponin: likely type 2 MI in the setting of respiratory as well as kidney failure, does have h/o CAD with multiple stents, Cardio on board. HFpEF:  EF 50-55% in . Repeat echo with simillar EF, hypokinetic RV with bowing of Interventricular septum towards LV. Chronic LE wounds: Wound care on board. Does not seem to be infected. Diet Diet NPO  ADULT TUBE FEEDING; Nasogastric; Peptide Based High Protein; Continuous; 20; Yes; 10; Q 4 hours; 65; 30; Q 4 hours; Protein; daily, via NGT   DVT Prophylaxis [] Lovenox, [x]  Heparin, [] SCDs, [] Ambulation,  [] Eliquis, [] Xarelto  [] Coumadin   Code Status Full Code   Disposition From: Home  Expected Disposition: TBD  Estimated Date of Discharge: TBD  Patient requires continued admission due to Respiratory and renal failure   Surrogate Decision Maker/ POA      Subjective:     Chief Complaint: Respiratory Distress       Kenan Ferguson is a 67 y.o. male who presents with respiratory distress. On CRRT and remains intubated. Review of Systems:    Review of Systems  Could not be obtained. Objective:      Intake/Output Summary (Last 24 hours) at 2/13/2023 1851  Last data filed at 2/13/2023 1804  Gross per 24 hour   Intake 509.69 ml   Output 3962 ml   Net -3452.31 ml        Vitals:   Vitals:    02/13/23 1800   BP: (!) 122/52   Pulse: 60   Resp: 18   Temp: 96.8 °F (36 °C)   SpO2: 100%       Physical Exam:   Physical Exam General: Ill-appearing male, NAD  Eyes: EOMI  ENT: neck supple, ETT  Cardiovascular: Regular rate. Respiratory: Clear to auscultation, mechanical ventilation  Gastrointestinal: Obese, soft, non tender  Genitourinary: no suprapubic tenderness, Fuller catheter  Musculoskeletal: 2+ lower extremity edema, chronic wounds on bilateral lower extremities  Skin: warm, dry  Neuro: Unresponsive on ventilator.   Psych: Unable to assess      Medications:   Medications:    heparin (porcine)  5,000 Units SubCUTAneous 3 times per day    sodium hypochlorite   Irrigation Daily    collagenase   Topical Daily    sodium chloride flush  5-40 mL IntraVENous 2 times per day    aspirin  81 mg Oral Daily    [Held by provider] clopidogrel  75 mg Oral Daily    ipratropium-albuterol  1 ampule Inhalation Q4H WA    chlorhexidine  15 mL Mouth/Throat BID    famotidine (PEPCID) injection  20 mg IntraVENous Daily      Infusions:    dextrose 30 mL/hr at 02/13/23 1612    prismaSATE BGK 2/0 1,500 mL/hr at 02/13/23 1645    prismaSATE BGK 4/2.5 200 mL/hr at 02/13/23 1018    prismaSATE BGK 2/0 1,000 mL/hr at 02/13/23 1601    calcium chloride CRRT infusion 8,000 mg (02/13/23 1447)    heparin 5000 units CRRT syringe      norepinephrine 2 mcg/min (02/13/23 1419)    dextrose      sodium chloride      propofol 25 mcg/kg/min (02/13/23 1737)     PRN Meds: fentanNYL, 50 mcg, Q1H PRN  midazolam, 1 mg, Q2H PRN  potassium chloride, 20 mEq, PRN  magnesium sulfate, 1,000 mg, PRN  calcium gluconate, 1,000 mg, PRN   Or  calcium gluconate, 2,000 mg, PRN   Or  calcium gluconate, 3,000 mg, PRN   Or  calcium gluconate, 4,000 mg, PRN  sodium phosphate IVPB, 6 mmol, PRN   Or  sodium phosphate IVPB, 12 mmol, PRN   Or  sodium phosphate IVPB, 18 mmol, PRN   Or  sodium phosphate IVPB, 24 mmol, PRN  heparin 5000 units CRRT syringe, , Continuous PRN  glucose, 4 tablet, PRN  dextrose bolus, 125 mL, PRN   Or  dextrose bolus, 250 mL, PRN  glucagon (rDNA), 1 mg, PRN  dextrose, , Continuous PRN  sodium chloride flush, 10 mL, PRN  sodium chloride, , PRN        Labs      Recent Results (from the past 24 hour(s))   Comprehensive Metabolic Panel w/ Reflex to MG    Collection Time: 02/12/23  7:07 PM   Result Value Ref Range    Sodium 136 135 - 145 MMOL/L    Potassium 5.1 3.5 - 5.1 MMOL/L    Chloride 105 99 - 110 mMol/L    CO2 23 21 - 32 MMOL/L    BUN 49 (H) 6 - 23 MG/DL    Creatinine 3.9 (H) 0.9 - 1.3 MG/DL    Est, Glom Filt Rate 16 (L) >60 mL/min/1.73m2    Glucose 72 70 - 99 MG/DL    Calcium 7.5 (L) 8.3 - 10.6 MG/DL    Albumin 2.7 (L) 3.4 - 5.0 GM/DL    Total Protein 5.9 (L) 6.4 - 8.2 GM/DL    Total Bilirubin 0.6 0.0 - 1.0 MG/DL    ALT 10 10 - 40 U/L    AST 18 15 - 37 IU/L    Alkaline Phosphatase 85 40 - 128 IU/L    Anion Gap 8 4 - 16   Calcium, Ionized    Collection Time: 02/12/23  7:07 PM   Result Value Ref Range    Ionized Ca 1.04 (L) 1.12 - 1.32 mMOL/L    Calcium, Ionized 4.16 (L) 4.48 - 5.28 MG/DL   Magnesium    Collection Time: 02/12/23  7:07 PM   Result Value Ref Range    Magnesium 2.1 1.8 - 2.4 mg/dl   Phosphorus    Collection Time: 02/12/23  7:07 PM   Result Value Ref Range    Phosphorus 4.0 2.5 - 4.9 MG/DL   Calcium, Ionized    Collection Time: 02/13/23  2:05 AM   Result Value Ref Range    Ionized Ca 1.11 (L) 1.12 - 1.32 mMOL/L    Calcium, Ionized 4.44 (L) 4.48 - 5.28 MG/DL   Lactic Acid    Collection Time: 02/13/23  7:07 AM   Result Value Ref Range    Lactate 1.5 0.5 - 1.9 mMOL/L   Comprehensive Metabolic Panel w/ Reflex to MG    Collection Time: 02/13/23  7:07 AM   Result Value Ref Range    Sodium 137 135 - 145 MMOL/L    Potassium 4.4 3.5 - 5.1 MMOL/L    Chloride 105 99 - 110 mMol/L    CO2 24 21 - 32 MMOL/L    BUN 38 (H) 6 - 23 MG/DL    Creatinine 3.3 (H) 0.9 - 1.3 MG/DL    Est, Glom Filt Rate 19 (L) >60 mL/min/1.73m2    Glucose 66 (L) 70 - 99 MG/DL    Calcium 7.7 (L) 8.3 - 10.6 MG/DL    Albumin 2.5 (L) 3.4 - 5.0 GM/DL    Total Protein 5.6 (L) 6.4 - 8.2 GM/DL    Total Bilirubin 0.7 0.0 - 1.0 MG/DL    ALT 10 10 - 40 U/L    AST 18 15 - 37 IU/L    Alkaline Phosphatase 76 40 - 128 IU/L    Anion Gap 8 4 - 16   Calcium, Ionized    Collection Time: 02/13/23  7:07 AM   Result Value Ref Range    Ionized Ca 1.15 1.12 - 1.32 mMOL/L    Calcium, Ionized 4.60 4.48 - 5.28 MG/DL   Magnesium    Collection Time: 02/13/23  7:07 AM   Result Value Ref Range    Magnesium 1.9 1.8 - 2.4 mg/dl   Phosphorus    Collection Time: 02/13/23  7:07 AM   Result Value Ref Range    Phosphorus 3.4 2.5 - 4.9 MG/DL   CBC with Auto Differential    Collection Time: 02/13/23  8:45 AM   Result Value Ref Range    WBC 4.7 4.0 - 10.5 K/CU MM    RBC 3.08 (L) 4.6 - 6.2 M/CU MM    Hemoglobin 7.9 (L) 13.5 - 18.0 GM/DL    Hematocrit 26.4 (L) 42 - 52 %    MCV 85.7 78 - 100 FL    MCH 25.6 (L) 27 - 31 PG    MCHC 29.9 (L) 32.0 - 36.0 %    RDW 18.2 (H) 11.7 - 14.9 %    Platelets 319 (L) 162 - 440 K/CU MM    MPV 9.9 7.5 - 11.1 FL    Differential Type AUTOMATED DIFFERENTIAL     Segs Relative 62.5 36 - 66 %    Lymphocytes % 22.7 (L) 24 - 44 %    Monocytes % 12.0 (H) 0 - 4 %    Eosinophils % 1.5 0 - 3 %    Basophils % 1.1 (H) 0 - 1 %    Segs Absolute 2.9 K/CU MM    Lymphocytes Absolute 1.1 K/CU MM    Monocytes Absolute 0.6 K/CU MM    Eosinophils Absolute 0.1 K/CU MM    Basophils Absolute 0.1 K/CU MM    Nucleated RBC % 1.1 %    Total Nucleated RBC 0.1 K/CU MM    Total Immature Neutrophil 0.01 K/CU MM    Immature Neutrophil % 0.2 0 - 0.43 %   Legionella antigen, urine    Collection Time: 02/13/23 11:50 AM    Specimen: Urine   Result Value Ref Range    Legionella Urinary Ag NEGATIVE NEGATIVE   Strep Pneumoniae Antigen    Collection Time: 02/13/23 11:50 AM    Specimen: CSF   Result Value Ref Range    Strep pneumo Ag URINE NEGATIVE    Urinalysis with Reflex to Culture    Collection Time: 02/13/23 11:50 AM    Specimen: Urine   Result Value Ref Range    Color, UA YELLOW YELLOW    Clarity, UA CLEAR CLEAR    Glucose, Urine NEGATIVE NEGATIVE MG/DL    Bilirubin Urine MODERATE NUMBER OR AMOUNT OBSERVED (A) NEGATIVE MG/DL    Ketones, Urine TRACE (A) NEGATIVE MG/DL    Specific Gravity, UA 1.025 1.001 - 1.035    Blood, Urine NEGATIVE NEGATIVE    pH, Urine 5.0 5.0 - 8.0    Protein, UA 30 (A) NEGATIVE MG/DL    Urobilinogen, Urine 1.0 0.2 - 1.0 MG/DL    Nitrite Urine, Quantitative NEGATIVE NEGATIVE    Leukocyte Esterase, Urine TRACE (A) NEGATIVE    RBC, UA 4 (H) 0 - 3 /HPF    WBC, UA <1 0 - 2 /HPF    Bacteria, UA NEGATIVE NEGATIVE /HPF    Squam Epithel, UA 7 /HPF    Mucus, UA RARE (A) NEGATIVE HPF    Sperm, UA RARE /HFP    Trichomonas, UA NONE SEEN NONE SEEN /HPF    Hyaline Casts, UA >20 /LPF   Calcium, Ionized    Collection Time: 02/13/23  1:40 PM   Result Value Ref Range    Ionized Ca 1.14 1.12 - 1.32 mMOL/L    Calcium, Ionized 4.56 4.48 - 5.28 MG/DL   POCT Glucose    Collection Time: 02/13/23  3:00 PM   Result Value Ref Range    POC Glucose 67 (L) 70 - 99 MG/DL        Imaging/Diagnostics Last 24 Hours   XR CHEST PORTABLE    Result Date: 2/11/2023  EXAMINATION: ONE XRAY VIEW OF THE CHEST 2/11/2023 7:29 pm COMPARISON: Chest radiograph 02/11/2023 HISTORY: ORDERING SYSTEM PROVIDED HISTORY: ETT placement TECHNOLOGIST PROVIDED HISTORY: Reason for exam:->ETT placement Reason for Exam: et and og tube placement confirmation Additional signs and symptoms: et and og tube placement confrimation FINDINGS: Lines and tubes: -ETT terminates at the superior margin of the clavicles. -enteric tube takes a the subdiaphragmatic course and terminates out of the field of view -right-sided Cordis catheter, which terminates within the mid/upper SVC Lungs:  There is indistinctness of the pulmonary vasculature with patchy opacities within the right greater than left lungs. . Pleura: Small right-sided pleural effusion. Cardiomediastinal silhouette: Enlarged cardiac silhouette. Bones: No acute osseous findings. Soft tissues: Left-sided 2 lead cardiac device. Lines and tubes as above. The ETT terminates at the level of the superior margin of the clavicles. Grossly unchanged pulmonary exam.  Findings favor cardiogenic pulmonary edema. However a superimposed infectious process cannot be excluded.        Electronically signed by Jessee Cruz MD on 2/13/2023 at 6:51 PM

## 2023-02-13 NOTE — CONSULTS
[facility] Wound Ostomy Continence Nurse  Consult Note       Mando Stephens  AGE: 67 y.o. GENDER: male  : 1950  TODAY'S DATE:  2023    Subjective:     Reason for CWOCN Evaluation and Assessment: wound assessment      Mando Stephens is a 67 y.o. male referred by:   [x] Physician  [] Nursing  [] Other:     Wound Identification:  Wound Type: venous, diabetic, and non-healing surgical  Contributing Factors: edema, venous stasis, diabetes, poor glucose control, chronic pressure, decreased mobility, obesity, decreased tissue oxygenation, and ESRD        PAST MEDICAL HISTORY        Diagnosis Date    Acid reflux     Acute MI ([facility] [address] ,     Arthritis     Back    Broken teeth     Upper Front    CAD (coronary artery disease)     Sees Dr. Naima Thurman [facility])     per old chart    Cerebral artery occlusion with cerebral infarction ([facility]     CHF (congestive heart failure) (HCC)     Chronic back pain     Chronic kidney disease     STAGE 3 KIDNEY FAILURE- \"from my diabetes- do not follow with any one- have seen Dr Nataliia Rivas in the past\"    Diabetes mellitus ([facility] Dx 1965    per old chart pt has been diabetic since age 13    Diabetic neuropathy ([facility] [address]     \"in my feet\"    H/O cardiovascular stress test 2016    H/O Doppler ultrasound 2018    Moderate disease of the right lower extremity with an JALEN of 0.72. Moderate to severe disease of the left lower extremity with an JALEN of 0.55.     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    Neuropathy     both feet    neuropathy     PAD (peripheral artery disease) ([facility] [facility] 2018    PVD (peripheral vascular disease) ([facility] [address]     Sick sinus syndrome (HCC)     Sleep apnea     \"sleep study 3 yrs ago- could not tolerate the cpap made me too dry\"    Spinal stenosis     Teeth missing     Upper And Lower    Type 2 diabetes mellitus without complication ([facility] [address]     WD-Chronic foot ulcer, left, with necrosis of bone ([facility] [address] 11/12/2021       PAST SURGICAL HISTORY    Past Surgical History:   Procedure Laterality Date    CARDIAC CATHETERIZATION      per old chart done 10/2014    CARDIAC CATHETERIZATION  07/14/2017    with angiography of leg    CARDIAC CATHETERIZATION  11/20/2018    PATENT STENTS OF ALL THREE MAJOR VESSELS    CARDIAC DEFIBRILLATOR PLACEMENT  06/04/2010    [employer] [address] DR Defibrillator Implanted    COLECTOMY Right 08/26/2016    laparascopic; robotic assisted    COLONOSCOPY  08/04/2016    CORONARY ANGIOPLASTY      \"15 Heart Stents\"    CORONARY ANGIOPLASTY WITH STENT PLACEMENT      per old chart had angio with stent to circumflex and obtuse marginal artery at [facility] 5/2010( old chart also gives hx of stent placement done 2000,2004 and 2005)    DENTAL SURGERY      Teeth Extracted In Past    IR TUNNELED CATHETER PLACEMENT GREATER THAN 5 YEARS  6/14/2021    IR TUNNELED CATHETER PLACEMENT GREATER THAN 5 YEARS 6/14/2021 [facility]    IR TUNNELED CATHETER PLACEMENT GREATER THAN 5 YEARS  11/17/2022    IR TUNNELED CATHETER PLACEMENT GREATER THAN 5 YEARS 11/17/2022 [facility]    PACEMAKER PLACEMENT  06/04/2010    [employer]tronic [address] DR Defibrillator Implanted    TOE AMPUTATION Right 09/12/2017    Rt 3rd toe    TOE AMPUTATION Right 01/09/2018     Right 5th toe amputation and Toenails trimmed left 2,3,4 and 5th toes    TOE AMPUTATION Left 12/26/2020    LEFT GREAT TOE AMPUTATION performed by Selene Muñiz MD at [address] Left 7/26/2022    LEFT SECOND TOE AMPUTATION performed by Maty Bernal MD at [address] Right 10/20/2022    Right First TOE AMPUTATION performed by Claudie Councilman, MD at [address] [place] [address]      per old chart had balloon angioplasty right superfical femoral artery,right popliteal artery,,right ant.tibial artery, right tibioperoneal trunk, and right post.tibial artery wna stent placement right popliteal artery and superfical femoral artery 7/2012       FAMILY HISTORY    Family History   Problem Relation Age of Onset    Diabetes Mother     Stroke Mother     High Blood Pressure Mother     Vision Loss Mother     Cancer Father         Prostate Cancer    Diabetes Sister     Neuropathy Sister     Other Sister         \"Breathing Problems\"    Heart Disease Sister     Early Death Sister 62        Heart Complications    Cancer Brother         \"Stomach Cancer\"    High Blood Pressure Brother     Diabetes Brother     Heart Disease Brother     High Blood Pressure Brother     Cancer Son         \"Testicle Cancer\"       SOCIAL HISTORY    Social History     Tobacco Use    Smoking status: Some Days     Types: Cigars    Smokeless tobacco: Never    Tobacco comments:     Client states he has stopped smoking   Vaping Use    Vaping Use: Never used   Substance Use Topics    Alcohol use: No    Drug use: Yes     Types: Marijuana (Weed)       ALLERGIES    Allergies   Allergen Reactions    Pcn [Penicillins] Hives    Fentanyl Itching       MEDICATIONS    No current facility-administered medications on file prior to encounter. Current Outpatient Medications on File Prior to Encounter   Medication Sig Dispense Refill    oxyCODONE-acetaminophen (PERCOCET) 7.5-325 MG per tablet Take 1 tablet by mouth every 8 hours as needed for Pain for up to 7 days.  Intended supply: 7 days Max Daily Amount: 3 tablets 21 tablet 0    sulfamethoxazole-trimethoprim (BACTRIM) 400-80 MG per tablet Take 1 tablet by mouth daily for 7 days 7 tablet 0    gentamicin (GARAMYCIN) 0.1 % cream Apply topically 3 times daily 60 g 3    torsemide (DEMADEX) 100 MG tablet TAKE 1 TABLET BY MOUTH TWICE A DAY IN THE MORNING AND IN THE EVENING 60 tablet 3    gabapentin (NEURONTIN) 300 MG capsule Take 300 mg by mouth in the morning and at bedtime.       atorvastatin (LIPITOR) 40 MG tablet Take 1 tablet by mouth nightly (Patient not taking: No sig reported) 30 tablet 3    carvedilol (COREG) 25 MG tablet Take 1 tablet by mouth 2 times daily 60 tablet 0    clopidogrel (PLAVIX) 75 MG tablet Take 1 tablet by mouth daily 30 tablet 1    amLODIPine (NORVASC) 10 MG tablet Take 1 tablet by mouth daily 30 tablet 1    empagliflozin (JARDIANCE) 10 MG tablet Take 1 tablet by mouth daily (Patient not taking: No sig reported) 30 tablet 1    metOLazone (ZAROXOLYN) 2.5 MG tablet Take 1 tablet by mouth in the morning and at bedtime 30 tablet 0    sodium hypochlorite (DAKINS) 0.125 % SOLN external solution Apply topically daily 1 each 0    aspirin 81 MG chewable tablet Take 1 tablet by mouth daily 30 tablet 3    isosorbide mononitrate (IMDUR) 30 MG extended release tablet Take 1 tablet by mouth daily (Patient not taking: No sig reported) 30 tablet 3    magnesium oxide (MAG-OX) 400 (240 Mg) MG tablet Take 1 tablet by mouth 2 times daily 30 tablet 0    SPIRIVA HANDIHALER 18 MCG inhalation capsule Inhale 18 mcg into the lungs daily (Patient not taking: No sig reported)      albuterol sulfate HFA (VENTOLIN HFA) 108 (90 Base) MCG/ACT inhaler Inhale 2 puffs into the lungs every 4 hours as needed for Wheezing 1 Inhaler 3         Objective:      BP (!) 109/58   Pulse 60   Temp 96.8 °F (36 °C) (Rectal)   Resp 17   Ht 6' (1.829 m)   Wt 298 lb 8.1 oz (135.4 kg)   SpO2 100%   BMI 40.48 kg/m²   Pablo Risk Score: Pablo Scale Score: 10    LABS    CBC:   Lab Results   Component Value Date/Time    WBC 4.7 02/13/2023 08:45 AM    RBC 3.08 02/13/2023 08:45 AM    HGB 7.9 02/13/2023 08:45 AM    HCT 26.4 02/13/2023 08:45 AM    MCV 85.7 02/13/2023 08:45 AM    MCH 25.6 02/13/2023 08:45 AM    MCHC 29.9 02/13/2023 08:45 AM    RDW 18.2 02/13/2023 08:45 AM    PLT 119 02/13/2023 08:45 AM    MPV 9.9 02/13/2023 08:45 AM     CMP:    Lab Results   Component Value Date/Time     02/13/2023 07:07 AM    K 4.4 02/13/2023 07:07 AM    K 5.1 01/10/2018 04:32 AM     02/13/2023 07:07 AM    CO2 24 02/13/2023 07:07 AM    BUN 38 02/13/2023 07:07 AM    CREATININE 3.3 02/13/2023 07:07 AM    GFRAA 25 10/17/2022 06:50 AM    LABGLOM 19 02/13/2023 07:07 AM    GLUCOSE 66 02/13/2023 07:07 AM    PROT 5.6 02/13/2023 07:07 AM    PROT 6.3 01/21/2013 12:10 PM    LABALBU 2.5 02/13/2023 07:07 AM    CALCIUM 7.7 02/13/2023 07:07 AM    BILITOT 0.7 02/13/2023 07:07 AM    ALKPHOS 76 02/13/2023 07:07 AM    AST 18 02/13/2023 07:07 AM    ALT 10 02/13/2023 07:07 AM     Albumin:    Lab Results   Component Value Date/Time    LABALBU 2.5 02/13/2023 07:07 AM     PT/INR:    Lab Results   Component Value Date/Time    PROTIME 14.0 01/11/2023 08:50 AM    PROTIME 11.2 09/08/2011 06:02 PM    INR 1.08 01/11/2023 08:50 AM     HgBA1c:    Lab Results   Component Value Date/Time    LABA1C 7.1 10/12/2022 06:28 AM         Assessment:     Patient Active Problem List   Diagnosis    PAD (peripheral artery disease) ([facility])    Chronic coronary artery disease    Biventricular ICD (implantable cardioverter-defibrillator) in place    Chronic combined systolic and diastolic heart failure (HCC)    Chronic kidney disease, stage III (moderate) ([facility])    Mixed hyperlipidemia    Sick sinus syndrome ([facility])    Type 2 diabetes mellitus with diabetic polyneuropathy ([facility] [address]    Spinal stenosis of lumbar region    Obesity, Class I, BMI 30-34.9    S/P partial colectomy    Tubulovillous adenoma of colon    Microalbuminuria    WD-PVD (peripheral vascular disease) ([facility])    Limb ischemia    Necrotic toes ([facility])    Toe gangrene (HCC)    Diabetic foot infection ([facility] [address]    Chronic kidney disease (CKD) stage G3a/A2, moderately decreased glomerular filtration rate (GFR) between 45-59 mL/min/1.73 square meter and albuminuria creatinine ratio between  mg/g ([address]    Edema    ICD (implantable cardioverter-defibrillator) battery depletion    Hyperkalemia    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[facility])    Ischemia of toe    Acute kidney injury ([address]    Fluid overload    DM (diabetes mellitus) ([address]    Precordial pain    Acute chest pain    Unstable angina ([facility])    Chronic kidney disease (CKD) stage G3a/A3, moderately decreased glomerular filtration rate (GFR) between 45-59 mL/min/1.73 square meter and albuminuria creatinine ratio greater than 300 mg/g ([facility])    Cardiomyopathy ([facility])    Diabetic neuropathy ([facility])    HTN (hypertension)    Epigastric pain    Acute on chronic congestive heart failure ([facility])    Leg edema    Acute on chronic systolic CHF (congestive heart failure) ([facility])    Diabetic foot ulcer with osteomyelitis ([address]    Visit for wound check    Moderate malnutrition ([address]    Long term (current) use of antibiotics    WD-Diabetic ulcer of toe of right foot associated with type 2 diabetes mellitus, with fat layer exposed ([address]    Diabetic ulcer of toe associated with type 2 diabetes mellitus, with bone involvement without evidence of necrosis ([address]    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    Persistent wound pain    Receiving intravenous antibiotic treatment as outpatient    Acute on chronic respiratory failure with hypoxemia ([address]    WD-Chronic foot ulcer, left, with necrosis of bone ([address]    Acute on chronic HFrEF (heart failure with reduced ejection fraction) ([facility])    Scrotal edema    Hypertensive urgency    Diabetic ulcer of right foot due to type 2 diabetes mellitus ([address]    Cellulitis of foot    Toe osteomyelitis ([facility])    Heart failure exacerbated by sotalol ([facility])    CHF (congestive heart failure), NYHA class I, acute on chronic, combined ([facility])    Acute on chronic diastolic CHF (congestive heart failure) ([facility])    Leg swelling    Acute on chronic diastolic heart failure ([facility])    Skin ulcer of left foot including toes with fat layer exposed ([address]    Superficial incisional surgical site infection Bacteremia due to Enterococcus    Acute respiratory failure with hypoxia and hypercapnia (HCC)    Acute kidney injury superimposed on CKD ([facility] [address]       Measurements:  Wound 07/06/17 Other (Comment) Toe (Comment  which one) (Active)   Number of days: 2047       Wound 12/10/20 Foot Left;Lateral fluid filled blister (Active)   Number of days: 794       Wound 10/30/21   #1 Left Dorsal Great Toe amp site  (Active)   Number of days: 471       Wound 10/30/21   #2 Left Dorsal Second Toe  (Active)   Number of days: 743       Wound 10/30/21 #3 Right Great Toe (Active)   Number of days: 104       Wound 02/25/22 #7 Right Dorsal 2nd Toe (Active)   Number of days: 352       Wound 11/11/22 Ankle Right;Medial (Active)   Number of days: 94       Wound 11/11/22 Toe (Comment  which one) Anterior;Right great toe amputation site (Active)   Number of days: 94       Wound 02/09/23 #1 Right Knee (Active)   Wound Image   02/13/23 1013   Wound Etiology Venous 02/13/23 1200   Dressing Status Clean;Dry; Intact 02/13/23 1200   Wound Cleansed Cleansed with saline 02/13/23 1013   Dressing/Treatment Foam 02/13/23 1200   Offloading for Diabetic Foot Ulcers Offloading not required 02/13/23 1200   Wound Length (cm) 0.5 cm 02/13/23 1013   Wound Width (cm) 0.4 cm 02/13/23 1013   Wound Depth (cm) 0 cm 02/13/23 1013   Wound Surface Area (cm^2) 0.2 cm^2 02/13/23 1013   Change in Wound Size % (l*w) 86.67 02/13/23 1013   Wound Volume (cm^3) 0 cm^3 02/13/23 1013   Wound Healing % 100 02/13/23 1013   Post-Procedure Length (cm) 1 cm 02/09/23 1214   Post-Procedure Width (cm) 1.5 cm 02/09/23 1214   Post-Procedure Depth (cm) 0.1 cm 02/09/23 1214   Post-Procedure Surface Area (cm^2) 1.5 cm^2 02/09/23 1214   Post-Procedure Volume (cm^3) 0.15 cm^3 02/09/23 1214   Distance Tunneling (cm) 0 cm 02/13/23 1200   Tunneling Position ___ O'Clock 0 02/13/23 1200   Undermining Starts ___ O'Clock 0 02/13/23 1200   Undermining Ends___ O'Clock 0 02/13/23 1200   Undermining Maxium Distance (cm) 0 02/13/23 1200   Wound Assessment Pink/red;Slough 02/13/23 1200   Drainage Amount Moderate 02/13/23 1200   Drainage Description Serosanguinous 02/13/23 1200   Odor None 02/13/23 1200   Dina-wound Assessment Dry/flaky 02/13/23 1200   Margins Defined edges 02/13/23 1200   Wound Thickness Description not for Pressure Injury Full thickness 02/13/23 1200   Number of days: 4       Wound 02/09/23 #2 Right Medial Lower Leg Cluster (Active)   Wound Image   02/13/23 1013   Wound Etiology Venous 02/13/23 1200   Dressing Status Clean;Dry; Intact 02/13/23 1200   Wound Cleansed Cleansed with saline 02/13/23 1013   Dressing/Treatment Gauze dressing/dressing sponge 02/13/23 1200   Offloading for Diabetic Foot Ulcers Other (comment) 02/13/23 1200   Wound Length (cm) 16 cm 02/13/23 1013   Wound Width (cm) 12 cm 02/13/23 1013   Wound Depth (cm) 0.1 cm 02/13/23 1013   Wound Surface Area (cm^2) 192 cm^2 02/13/23 1013   Change in Wound Size % (l*w) 23.2 02/13/23 1013   Wound Volume (cm^3) 19.2 cm^3 02/13/23 1013   Wound Healing % 23 02/13/23 1013   Post-Procedure Length (cm) 20 cm 02/09/23 1214   Post-Procedure Width (cm) 12.5 cm 02/09/23 1214   Post-Procedure Depth (cm) 0.1 cm 02/09/23 1214   Post-Procedure Surface Area (cm^2) 250 cm^2 02/09/23 1214   Post-Procedure Volume (cm^3) 25 cm^3 02/09/23 1214   Distance Tunneling (cm) 0 cm 02/13/23 1200   Tunneling Position ___ O'Clock 0 02/13/23 1200   Undermining Starts ___ O'Clock 0 02/13/23 1200   Undermining Ends___ O'Clock 0 02/13/23 1200   Undermining Maxium Distance (cm) 0 02/13/23 1200   Wound Assessment Pink/red;Slough 02/13/23 1200   Drainage Amount None 02/13/23 1200   Drainage Description Yellow;Green 02/13/23 1200   Odor Moderate 02/13/23 1200   Dina-wound Assessment Maceration 02/13/23 1200   Margins Undefined edges 02/13/23 1200   Wound Thickness Description not for Pressure Injury Full thickness 02/13/23 1200   Number of days: 4       Wound 02/09/23 #3 Right Great toe amp site (Active)   Wound Image   02/13/23 1013   Wound Etiology Diabetic 02/13/23 1200   Dressing Status Clean;Dry; Intact 02/13/23 1200   Wound Cleansed Cleansed with saline 02/13/23 1013   Dressing/Treatment Gauze dressing/dressing sponge 02/13/23 1200   Offloading for Diabetic Foot Ulcers Other (comment) 02/13/23 1200   Wound Length (cm) 3.5 cm 02/13/23 1013   Wound Width (cm) 4.5 cm 02/13/23 1013   Wound Depth (cm) 2.2 cm 02/13/23 1013   Wound Surface Area (cm^2) 15.75 cm^2 02/13/23 1013   Change in Wound Size % (l*w) 21.25 02/13/23 1013   Wound Volume (cm^3) 34.65 cm^3 02/13/23 1013   Wound Healing % 36 02/13/23 1013   Post-Procedure Length (cm) 4 cm 02/09/23 1214   Post-Procedure Width (cm) 5 cm 02/09/23 1214   Post-Procedure Depth (cm) 2.7 cm 02/09/23 1214   Post-Procedure Surface Area (cm^2) 20 cm^2 02/09/23 1214   Post-Procedure Volume (cm^3) 54 cm^3 02/09/23 1214   Distance Tunneling (cm) 0 cm 02/13/23 1200   Tunneling Position ___ O'Clock 0 02/13/23 1200   Undermining Starts ___ O'Clock 0 02/13/23 1200   Undermining Ends___ O'Clock 0 02/13/23 1200   Undermining Maxium Distance (cm) 0 02/13/23 1200   Wound Assessment Slough 02/13/23 1200   Drainage Amount None 02/13/23 1200   Drainage Description Yellow;Green 02/13/23 1200   Odor Malodorous/putrid 02/13/23 1200   Dina-wound Assessment Maceration 02/13/23 1200   Margins Defined edges 02/13/23 1200   Wound Thickness Description not for Pressure Injury Full thickness 02/13/23 1200   Number of days: 4       Wound 02/09/23 #4 Right 4th toe (Active)   Wound Image   02/09/23 1126   Wound Etiology Diabetic 02/13/23 1200   Dressing Status Clean;Dry; Intact 02/13/23 1200   Wound Cleansed Cleansed with saline 02/12/23 1553   Dressing/Treatment Gauze dressing/dressing sponge 02/13/23 1200   Offloading for Diabetic Foot Ulcers Other (comment) 02/13/23 1200   Wound Length (cm) 0 cm 02/13/23 1013   Wound Width (cm) 0 cm 02/13/23 1013   Wound Depth (cm) 0 cm 02/13/23 1013   Wound Surface Area (cm^2) 0 cm^2 02/13/23 1013   Change in Wound Size % (l*w) 100 02/13/23 1013   Wound Volume (cm^3) 0 cm^3 02/13/23 1013   Wound Healing % 100 02/13/23 1013   Post-Procedure Length (cm) 0.5 cm 02/09/23 1214   Post-Procedure Width (cm) 0.8 cm 02/09/23 1214   Post-Procedure Depth (cm) 0.1 cm 02/09/23 1214   Post-Procedure Surface Area (cm^2) 0.4 cm^2 02/09/23 1214   Post-Procedure Volume (cm^3) 0.04 cm^3 02/09/23 1214   Distance Tunneling (cm) 0 cm 02/13/23 1200   Tunneling Position ___ O'Clock 0 02/13/23 1200   Undermining Starts ___ O'Clock 0 02/13/23 1200   Undermining Ends___ O'Clock 0 02/13/23 1200   Undermining Maxium Distance (cm) 0 02/13/23 1200   Wound Assessment Slough;[place]/red 02/13/23 1200   Drainage Amount None 02/13/23 1200   Drainage Description Yellow;Green 02/13/23 1200   Odor Malodorous/putrid 02/13/23 1200   Dina-wound Assessment Maceration 02/13/23 1200   Margins Defined edges 02/13/23 1200   Wound Thickness Description not for Pressure Injury Full thickness 02/13/23 1200   Number of days: 4       Wound 02/09/23 #5 Left lateral lower leg cluster (Active)   Wound Image   02/13/23 1013   Wound Etiology Venous 02/13/23 1200   Dressing Status Clean;Dry; Intact 02/13/23 1200   Wound Cleansed Cleansed with saline 02/13/23 1013   Dressing/Treatment Gauze dressing/dressing sponge 02/13/23 1200   Offloading for Diabetic Foot Ulcers Other (comment) 02/13/23 1200   Wound Length (cm) 11 cm 02/13/23 1013   Wound Width (cm) 6 cm 02/13/23 1013   Wound Depth (cm) 0.1 cm 02/13/23 1013   Wound Surface Area (cm^2) 66 cm^2 02/13/23 1013   Change in Wound Size % (l*w) -175 02/13/23 1013   Wound Volume (cm^3) 6.6 cm^3 02/13/23 1013   Wound Healing % -175 02/13/23 1013   Post-Procedure Length (cm) 4 cm 02/09/23 1214   Post-Procedure Width (cm) 6 cm 02/09/23 1214   Post-Procedure Depth (cm) 0.1 cm 02/09/23 1214   Post-Procedure Surface Area (cm^2) 24 cm^2 02/09/23 1214   Post-Procedure Volume (cm^3) 2.4 cm^3 02/09/23 1214   Distance Tunneling (cm) 0 cm 02/13/23 1200   Tunneling Position ___ O'Clock 0 02/13/23 1200   Undermining Starts ___ O'Clock 0 02/13/23 1200   Undermining Ends___ O'Clock 0 02/13/23 1200   Undermining Maxium Distance (cm) 0 02/13/23 1200   Wound Assessment Slough;[place]/red 02/13/23 1200   Drainage Amount None 02/13/23 1200   Drainage Description Yellow 02/13/23 1200   Odor Mild 02/13/23 1200   Dina-wound Assessment Dry/flaky 02/13/23 1013   Margins Undefined edges 02/13/23 1200   Wound Thickness Description not for Pressure Injury Full thickness 02/13/23 1200   Number of days: 4       Wound 02/09/23 #6 Left great toe amp site (Active)   Wound Image   02/13/23 1013   Wound Etiology Diabetic 02/13/23 1200   Dressing Status Clean;Dry; Intact 02/13/23 1200   Wound Cleansed Cleansed with saline 02/13/23 1013   Dressing/Treatment Gauze dressing/dressing sponge 02/13/23 1200   Offloading for Diabetic Foot Ulcers Other (comment) 02/13/23 1200   Wound Length (cm) 1.2 cm 02/13/23 1013   Wound Width (cm) 2.5 cm 02/13/23 1013   Wound Depth (cm) 0.3 cm 02/13/23 1013   Wound Surface Area (cm^2) 3 cm^2 02/13/23 1013   Change in Wound Size % (l*w) 50 02/13/23 1013   Wound Volume (cm^3) 0.9 cm^3 02/13/23 1013   Wound Healing % 81 02/13/23 1013   Post-Procedure Length (cm) 2 cm 02/09/23 1214   Post-Procedure Width (cm) 3 cm 02/09/23 1214   Post-Procedure Depth (cm) 0.8 cm 02/09/23 1214   Post-Procedure Surface Area (cm^2) 6 cm^2 02/09/23 1214   Post-Procedure Volume (cm^3) 4.8 cm^3 02/09/23 1214   Distance Tunneling (cm) 0 cm 02/13/23 1200   Tunneling Position ___ O'Clock 0 02/13/23 1200   Undermining Starts ___ O'Clock 0 02/13/23 1200   Undermining Ends___ O'Clock 0 02/13/23 1200   Undermining Maxium Distance (cm) 0 02/13/23 1200   Wound Assessment Slough 02/13/23 1200   Drainage Amount None 02/13/23 1200   Drainage Description Yellow;Green 02/13/23 1200   Odor Malodorous/putrid 02/13/23 1200 Dina-wound Assessment Maceration 02/13/23 1200   Margins Defined edges 02/13/23 1200   Wound Thickness Description not for Pressure Injury Full thickness 02/13/23 1200   Number of days: 4       Wound 02/09/23 #7 Left 2nd toe amp site (Active)   Wound Image   02/09/23 1126   Wound Etiology Diabetic 02/13/23 1200   Dressing Status Clean;Dry; Intact 02/13/23 1200   Wound Cleansed Cleansed with saline 02/13/23 1013   Dressing/Treatment Gauze dressing/dressing sponge 02/13/23 1200   Offloading for Diabetic Foot Ulcers Other (comment) 02/13/23 1200   Wound Length (cm) 2 cm 02/13/23 1013   Wound Width (cm) 1.5 cm 02/13/23 1013   Wound Depth (cm) 0.4 cm 02/13/23 1013   Wound Surface Area (cm^2) 3 cm^2 02/13/23 1013   Change in Wound Size % (l*w) 50 02/13/23 1013   Wound Volume (cm^3) 1.2 cm^3 02/13/23 1013   Wound Healing % 60 02/13/23 1013   Post-Procedure Length (cm) 3 cm 02/09/23 1214   Post-Procedure Width (cm) 2 cm 02/09/23 1214   Post-Procedure Depth (cm) 0.5 cm 02/09/23 1214   Post-Procedure Surface Area (cm^2) 6 cm^2 02/09/23 1214   Post-Procedure Volume (cm^3) 3 cm^3 02/09/23 1214   Distance Tunneling (cm) 0 cm 02/13/23 1200   Tunneling Position ___ O'Clock 0 02/13/23 1200   Undermining Starts ___ O'Clock 0 02/13/23 1200   Undermining Ends___ O'Clock 0 02/13/23 1200   Undermining Maxium Distance (cm) 0 02/13/23 1200   Wound Assessment Slough 02/13/23 1200   Drainage Amount None 02/13/23 1200   Drainage Description Yellow;Green 02/13/23 1200   Odor Malodorous/putrid 02/13/23 1200   Dina-wound Assessment Maceration 02/13/23 1200   Margins Defined edges 02/13/23 1200   Wound Thickness Description not for Pressure Injury Full thickness 02/13/23 1200   Number of days: 4       Wound 02/09/23 #8 Left 3rd toe (Active)   Wound Image   02/09/23 1126   Wound Etiology Diabetic 02/13/23 1200   Dressing Status Clean;Dry; Intact 02/13/23 1200   Wound Cleansed Cleansed with saline 02/13/23 1013   Dressing/Treatment Gauze dressing/dressing sponge 02/13/23 1200   Offloading for Diabetic Foot Ulcers Other (comment) 02/13/23 1200   Wound Length (cm) 3 cm 02/13/23 1013   Wound Width (cm) 2 cm 02/13/23 1013   Wound Depth (cm) 0.2 cm 02/13/23 1013   Wound Surface Area (cm^2) 6 cm^2 02/13/23 1013   Change in Wound Size % (l*w) 57.14 02/13/23 1013   Wound Volume (cm^3) 1.2 cm^3 02/13/23 1013   Wound Healing % 14 02/13/23 1013   Post-Procedure Length (cm) 4 cm 02/09/23 1214   Post-Procedure Width (cm) 3.5 cm 02/09/23 1214   Post-Procedure Depth (cm) 0.1 cm 02/09/23 1214   Post-Procedure Surface Area (cm^2) 14 cm^2 02/09/23 1214   Post-Procedure Volume (cm^3) 1.4 cm^3 02/09/23 1214   Distance Tunneling (cm) 0 cm 02/13/23 1200   Tunneling Position ___ O'Clock 0 02/13/23 1200   Undermining Starts ___ O'Clock 0 02/13/23 1200   Undermining Ends___ O'Clock 0 02/13/23 1200   Undermining Maxium Distance (cm) 0 02/13/23 1200   Wound Assessment Slough 02/13/23 1200   Drainage Amount None 02/13/23 1200   Drainage Description Yellow;Green 02/13/23 1200   Odor Malodorous/putrid 02/13/23 1200   Dina-wound Assessment Maceration 02/13/23 1013   Margins Defined edges 02/13/23 1200   Wound Thickness Description not for Pressure Injury Full thickness 02/13/23 1200   Number of days: 4       Wound 02/09/23 #9 Left 4th toe (Active)   Wound Image   02/09/23 1126   Wound Etiology Diabetic 02/13/23 1200   Dressing Status Dry;Clean; Intact 02/13/23 1200   Wound Cleansed Cleansed with saline 02/13/23 1013   Dressing/Treatment Gauze dressing/dressing sponge 02/13/23 1200   Offloading for Diabetic Foot Ulcers Other (comment) 02/13/23 1200   Wound Length (cm) 2 cm 02/13/23 1013   Wound Width (cm) 1.3 cm 02/13/23 1013   Wound Depth (cm) 0.1 cm 02/13/23 1013   Wound Surface Area (cm^2) 2.6 cm^2 02/13/23 1013   Change in Wound Size % (l*w) 80.88 02/13/23 1013   Wound Volume (cm^3) 0.26 cm^3 02/13/23 1013   Wound Healing % 81 02/13/23 1013   Post-Procedure Length (cm) 4 cm 02/09/23 1214   Post-Procedure Width (cm) 3.4 cm 02/09/23 1214   Post-Procedure Depth (cm) 0.1 cm 02/09/23 1214   Post-Procedure Surface Area (cm^2) 13.6 cm^2 02/09/23 1214   Post-Procedure Volume (cm^3) 1.36 cm^3 02/09/23 1214   Distance Tunneling (cm) 0 cm 02/13/23 1200   Tunneling Position ___ O'Clock 0 02/13/23 1200   Undermining Starts ___ O'Clock 0 02/13/23 1200   Undermining Ends___ O'Clock 0 02/13/23 1200   Undermining Maxium Distance (cm) 0 02/13/23 1200   Wound Assessment Slough 02/13/23 1200   Drainage Amount None 02/13/23 1200   Drainage Description Yellow;Green 02/13/23 1200   Odor Malodorous/putrid 02/13/23 1200   Dina-wound Assessment Maceration 02/13/23 1200   Margins Defined edges 02/13/23 1200   Wound Thickness Description not for Pressure Injury Full thickness 02/13/23 1200   Number of days: 4       Wound 02/13/23 Knee Anterior; Left (Active)   Wound Image   02/13/23 1013   Wound Etiology Venous 02/13/23 1013   Dressing Status New dressing applied 02/13/23 1013   Wound Cleansed Cleansed with saline 02/13/23 1013   Dressing/Treatment Silicone border 26/50/90 1013   Wound Length (cm) 1 cm 02/13/23 1013   Wound Width (cm) 1.2 cm 02/13/23 1013   Wound Depth (cm) 0.1 cm 02/13/23 1013   Wound Surface Area (cm^2) 1.2 cm^2 02/13/23 1013   Wound Volume (cm^3) 0.12 cm^3 02/13/23 1013   Distance Tunneling (cm) 0 cm 02/13/23 1013   Tunneling Position ___ O'Clock 0 02/13/23 1013   Undermining Starts ___ O'Clock 0 02/13/23 1013   Undermining Ends___ O'Clock 0 02/13/23 1013   Undermining Maxium Distance (cm) 0 02/13/23 1013   Wound Assessment Pink/red; Other (Comment) 02/13/23 1013   Drainage Amount None 02/13/23 1013   Odor None 02/13/23 1013   Dina-wound Assessment Intact 02/13/23 1013   Margins Defined edges 02/13/23 1013   Wound Thickness Description not for Pressure Injury Full thickness 02/13/23 1013   Number of days: 0       Wound 02/13/23 Right;Plantar heel (Active)   Wound Image   02/13/23 1013   Wound Etiology Diabetic 02/13/23 1013   Dressing Status New dressing applied 02/13/23 1013   Wound Cleansed Cleansed with saline 02/13/23 1013   Dressing/Treatment Alginate with Ag;ABD;Roll gauze;Tape/Soft cloth adhesive tape 02/13/23 1013   Wound Length (cm) 1.2 cm 02/13/23 1013   Wound Width (cm) 1.5 cm 02/13/23 1013   Wound Depth (cm) 0.2 cm 02/13/23 1013   Wound Surface Area (cm^2) 1.8 cm^2 02/13/23 1013   Wound Volume (cm^3) 0.36 cm^3 02/13/23 1013   Distance Tunneling (cm) 0 cm 02/13/23 1013   Tunneling Position ___ O'Clock 0 02/13/23 1013   Undermining Starts ___ O'Clock 0 02/13/23 1013   Undermining Ends___ O'Clock 0 02/13/23 1013   Undermining Maxium Distance (cm) 0 02/13/23 1013   Wound Assessment Pink/red 02/13/23 1013   Drainage Amount Moderate 02/13/23 1013   Drainage Description Serosanguinous 02/13/23 1013   Odor None 02/13/23 1013   Dina-wound Assessment Intact 02/13/23 1013   Margins Defined edges 02/13/23 1013   Wound Thickness Description not for Pressure Injury Full thickness 02/13/23 1013   Number of days: 0       Response to treatment:  Well tolerated by patient. Pain Assessment:  Severity:  none  Quality of pain: na  Wound Pain Timing/Severity: na  Premedicated: no    Plan:     Plan of Care: Wound 02/13/23 Knee Anterior; Left-Dressing/Treatment: Silicone border  [REMOVED] Wound 02/13/23 Toe (Comment  which one) Left 4th-Dressing/Treatment: Alginate with Ag, ABD, Roll gauze, Tape/Soft cloth adhesive tape  Wound 02/13/23 Right;Plantar heel-Dressing/Treatment: Alginate with Ag, ABD, Roll gauze, Tape/Soft cloth adhesive tape  Wound 02/09/23 #1 Right Knee-Dressing/Treatment: Foam  Wound 02/09/23 #2 Right Medial Lower Leg Cluster-Dressing/Treatment: Gauze dressing/dressing sponge  Wound 02/09/23 #3 Right Great toe amp site-Dressing/Treatment: Gauze dressing/dressing sponge  Wound 02/09/23 #4 Right 4th toe-Dressing/Treatment: Gauze dressing/dressing sponge  Wound 02/09/23 #5 Left lateral lower leg cluster-Dressing/Treatment: Gauze dressing/dressing sponge  Wound 02/09/23 #6 Left great toe amp site-Dressing/Treatment: Gauze dressing/dressing sponge  Wound 02/09/23 #7 Left 2nd toe amp site-Dressing/Treatment: Gauze dressing/dressing sponge  Wound 02/09/23 #8 Left 3rd toe-Dressing/Treatment: Gauze dressing/dressing sponge  Wound 02/09/23 #9 Left 4th toe-Dressing/Treatment: Gauze dressing/dressing sponge    Pt in bed. Intubated. Currently on dialysis. Per nurse report, sacrum intact. Active with outpatient wound clinic and toe amputations per Dr. Gloria Garcia in 2022. Dressings to bilateral feet and legs removed. Large yellow purulent drainage and foul odor noted from all wounds. Legs dry and scaly and washed with bath oil. Diabetic/venous/non healing surgical wound noted. Recommend general surgeon evaluate, santyl, Dakin's wet to dry. Aquacel AG applied for now. Right and left knee with wounds. Possible venous/traumatic. Mostly dry. Silicone foam borders applied. Pictures and measurements taken. Updated nurse and MD on recommendations. Pt is a high risk for skin breakdown AEB Pablo. Follow Pablo orders. Specialty Bed Required : yes  [x] Low Air Loss   [x] Pressure Redistribution  [] Fluid Immersion  [] Bariatric  [] Total Pressure Relief  [] Other:     Discharge Plan:  Placement for patient upon discharge: tbd  Hospice Care: no  Patient appropriate for Outpatient [address] [facility] [address]: active    Patient/Caregiver Teaching:  Level of patient/caregiver understanding able to:   Not able at this time.         Electronically signed by Kenyon Swift RN, Marianne Garay on 2/13/2023 at 12:38 PM

## 2023-02-13 NOTE — CONSULTS
Comprehensive Nutrition Assessment    Type and Reason for Visit:  Initial, Consult (TF order/mgt, Vent)    Nutrition Recommendations/Plan:   Begin EN with peptide based high protein formula (Vital High Protein) progressed to 65 ml/hr to provide 1560 kcal (1932 tot with current propofol) and 136 gm protein  2. Add daily protein modular (Proteinex) for an additional 26 gm protein (162 gm tot)     Malnutrition Assessment:  Malnutrition Status:  No malnutrition (02/13/23 1225)    Context:  Acute Illness       Nutrition Assessment:    Pt admitted with acute respiratory failure with hypoxia and hypercapnia, hyperkalemia. H/O CAD, HFpEF, ICD, CKD, DM. Currently sedated on propofol, CRRT and intubated. MAP stable on pressor. Will order EN and continue to follow as high nutrition risk. Nutrition Related Findings:    BUN 38, Cr 3.3 GFR 19, Glu 66   Wound Type:  (excoriation)       Current Nutrition Intake & Therapies:    Average Meal Intake: NPO  Average Supplements Intake: NPO  Diet NPO  Additional Calorie Sources:  372 lipid kcal from propofol at 14.1 ml/hr    Anthropometric Measures:  Height: 6' (182.9 cm)  Ideal Body Weight (IBW): 178 lbs (81 kg)    Admission Body Weight: 299 lb 4.8 oz (135.8 kg)  Current Body Weight: 298 lb 8.1 oz (135.4 kg), 167.7 % IBW.     Current BMI (kg/m2): 40.5  Usual Body Weight: 261 lb 7 oz (118.6 kg) (11/13/22)  % Weight Change (Calculated): 14.2                    BMI Categories: Obese Class 3 (BMI 40.0 or greater)    Estimated Daily Nutrient Needs:  Energy Requirements Based On: Formula  Weight Used for Energy Requirements: Current  Energy (kcal/day): 2005 (PSU 2010)  Weight Used for Protein Requirements: Ideal  Protein (g/day): 162-202 (2-2.5 g/kg IBW)  Method Used for Fluid Requirements: Standard Renal  Fluid (ml/day): per Nephrology    Nutrition Diagnosis:   Inadequate protein-energy intake related to impaired respiratory function as evidenced by NPO or clear liquid status due to medical condition, intubation    Nutrition Interventions:   Food and/or Nutrient Delivery: Continue NPO, Start Tube Feeding  Nutrition Education/Counseling: No recommendation at this time  Coordination of Nutrition Care: Continue to monitor while inpatient       Goals:     Goals: Initiate nutrition support       Nutrition Monitoring and Evaluation:   Behavioral-Environmental Outcomes: None Identified  Food/Nutrient Intake Outcomes: Enteral Nutrition Intake/Tolerance, IVF Intake  Physical Signs/Symptoms Outcomes: Biochemical Data, GI Status, Fluid Status or Edema, Hemodynamic Status, Skin, Weight    Discharge Planning:     Too soon to determine     Bubba Sanchez, [address] [place] [address],   Contact: [phone]

## 2023-02-13 NOTE — PROGRESS NOTES
I have personally seen and examined the patient independently. I have reviewed the patient's available data,including medical history and recent test results. Reviewed and discussed note as documented by WAGNER. I agree with the physical exam findings, assessment and plan. My documented complex medical decisions constitute a substantive portion of the supervisory note.   48 minutes    Acute respiratory failure with hypoxia, hypercapnia  Acute kidney injury (superimposed on CKD stage III), currently requires dialytic support  Hypervolemia/pulmonary edema due to above  Coronary artery disease  History of chronic diastolic heart failure (suspect acute on chronic)  Diabetes mellitus  Severe peripheral vascular disease    Pending finalized results of cultures, antigens discontinuation of empiric antibiotic (provided for questionable pneumonia which seems unlikely based on current evaluation)  Dialytic support ultrafiltration per nephrology service    SHAHEEN Carpenter  [phone]

## 2023-02-13 NOTE — PROGRESS NOTES
[facility]   Progress Note  [facility] 0 1 2      Date: 2023   Patient: Eyal Villanueva   : 1950   DOA: 2023   MRN: [id number]   ROOM#: 2126/2126-A     Admit Date: 2023     Collaborating Urologist on Call at time of admission: Dr. Tima Mcintyre    CC: SOB  Reason for Consult: parkinson catheter insertion    Subjective:     Pt currently intubated in the ICU  Voiding: no urine output overnight via external catheter; bladder scan 417ml this AM.  12Fr parkinson catheter inserted w sterile technique wo difficulty or trauma x 1 attempt; clear, dark yellow urine drained to bag; secured to the right leg with a commercial device. No trauma/bleeding during procedure. Objective:    Vitals:    BP (!) 124/55   Pulse 61   Temp 97.7 °F (36.5 °C) (Rectal)   Resp 16   Ht 6' (1.829 m)   Wt 298 lb 8.1 oz (135.4 kg)   SpO2 100%   BMI 40.48 kg/m²    Temp  Av.1 °F (36.7 °C)  Min: 97.7 °F (36.5 °C)  Max: 99.3 °F (37.4 °C)     Intake/Output Summary (Last 24 hours) at 2023 0753  Last data filed at 2023 6847  Gross per 24 hour   Intake 1217.65 ml   Output 3211 ml   Net -1993.35 ml       Physical Exam:   General appearance: appears stated age, moderately obese, and intubated in the ICU  Head: Normocephalic, without obvious abnormality, atraumatic  Back: symmetric, no curvature. ROM normal. No CVA tenderness.   Abdomen: soft, non-tender; bowel sounds normal; no masses,  no organomegaly  Male genitalia: normal, abnormal findings: penile edema    Labs:   WBC:    Lab Results   Component Value Date/Time    WBC 4.6 2023 10:47 AM      Hemoglobin/Hematocrit:    Lab Results   Component Value Date/Time    HGB 10.3 2023 10:47 AM    HCT 37.0 2023 10:47 AM      BMP:   Lab Results   Component Value Date/Time     2023 07:07 PM    K 5.1 2023 07:07 PM    K 5.1 01/10/2018 04:32 AM     2023 07:07 PM    CO2 23 2023 07:07 PM    BUN 49 2023 PT DAILY TREATMENT NOTE 10-18    Patient Name: Emmie Perales  Date:2019  : 1968  [x]  Patient  Verified  Payor: [facility] MEDICAID / Plan: [facility] [employer] [facility] / Product Type: Managed Care Medicaid /    In time:2:30  Out time:3:22  Total Treatment Time (min): 52  Visit #: 1 of 4    Medicare/BCBS Only   Total Timed Codes (min):  42 1:1 Treatment Time:  34       Treatment Area: Low back pain [M54.5]  Thoracic back pain [M54.6]    SUBJECTIVE  Pain Level (0-10 scale): 2  Any medication changes, allergies to medications, adverse drug reactions, diagnosis change, or new procedure performed?: [x] No    [] Yes (see summary sheet for update)  Subjective functional status/changes:   [] No changes reported  Pt reports her left hip is not hurting her to bad today but she pulled her right hip at work and that is giving her more problems today. OBJECTIVE    Modality rationale: decrease pain and increase tissue extensibility to improve the patients ability to perform ADL's with ease.    Min Type Additional Details    [] Estim:  []Unatt       []IFC  []Premod                        []Other:  []w/ice   []w/heat  Position:  Location:    [] Estim: []Att    []TENS instruct  []NMES                    []Other:  []w/US   []w/ice   []w/heat  Position:  Location:    []  Traction: [] Cervical       []Lumbar                       [] Prone          []Supine                       []Intermittent   []Continuous Lbs:  [] before manual  [] after manual    []  Ultrasound: []Continuous   [] Pulsed                           []1MHz   []3MHz W/cm2:  Location:    []  Iontophoresis with dexamethasone         Location: [] Take home patch   [] In clinic   10 []  Ice     [x]  heat  []  Ice massage  []  Laser   []  Anodyne Position: left S/L  Location: right hip    []  Laser with stim  []  Other:  Position:  Location:    []  Vasopneumatic Device Pressure:       [] lo [] med [] hi   Temperature: [] lo [] med [] hi   [] Skin assessment 07:07 PM    LABALBU 2.7 02/12/2023 07:07 PM    CREATININE 3.9 02/12/2023 07:07 PM    CALCIUM 7.5 02/12/2023 07:07 PM    GFRAA 25 10/17/2022 06:50 AM    LABGLOM 16 02/12/2023 07:07 PM      PT/INR:    Lab Results   Component Value Date/Time    PROTIME 14.0 01/11/2023 08:50 AM    PROTIME 11.2 09/08/2011 06:02 PM    INR 1.08 01/11/2023 08:50 AM      PTT:    Lab Results   Component Value Date/Time    APTT 38.7 01/11/2023 08:50 AM     Blood Culture:  pending    Imaging:   XR CHEST PORTABLE    Result Date: 2/11/2023  EXAMINATION: ONE XRAY VIEW OF THE CHEST 2/11/2023 7:29 pm COMPARISON: Chest radiograph 02/11/2023 HISTORY: ORDERING SYSTEM PROVIDED HISTORY: ETT placement TECHNOLOGIST PROVIDED HISTORY: Reason for exam:->ETT placement Reason for Exam: et and og tube placement confirmation Additional signs and symptoms: et and og tube placement confrimation FINDINGS: Lines and tubes: -ETT terminates at the superior margin of the clavicles. -enteric tube takes a the subdiaphragmatic course and terminates out of the field of view -right-sided Cordis catheter, which terminates within the mid/upper SVC Lungs: There is indistinctness of the pulmonary vasculature with patchy opacities within the right greater than left lungs. . Pleura: Small right-sided pleural effusion. Cardiomediastinal silhouette: Enlarged cardiac silhouette. Bones: No acute osseous findings. Soft tissues: Left-sided 2 lead cardiac device. Lines and tubes as above. The ETT terminates at the level of the superior margin of the clavicles. Grossly unchanged pulmonary exam.  Findings favor cardiogenic pulmonary edema. However a superimposed infectious process cannot be excluded.      XR CHEST PORTABLE    Result Date: 2/11/2023  EXAMINATION: ONE XRAY VIEW OF THE CHEST 2/11/2023 11:04 am COMPARISON: 11/11/2022 HISTORY: ORDERING SYSTEM PROVIDED HISTORY: SOB TECHNOLOGIST PROVIDED HISTORY: Reason for exam:->SOB Reason for Exam: SOB FINDINGS: Cardiomegaly, pulmonary post-treatment:  []intact []redness- no adverse reaction    []redness  adverse reaction:       34 min Therapeutic Exercise:  [x] See flow sheet :   Rationale: increase ROM and increase strength to improve the patients ability to perform functional task with ease. 8 min Manual Therapy:  STM right L/S paraspinals/QL   Rationale: decrease pain, increase ROM and increase tissue extensibility to improve overall functional mobility. With   [] TE   [] TA   [] neuro   [] other: Patient Education: [x] Review HEP    [] Progressed/Changed HEP based on:   [] positioning   [] body mechanics   [] transfers   [] heat/ice application    [] other:      Other Objective/Functional Measures:      Pain Level (0-10 scale) post treatment: 1    ASSESSMENT/Changes in Function:   Pt reports a significant decrease in pain in the left hip and improved mobility secondary to decreased pain. No complaints with therex this visit. Decreased pain post treatment. Patient will continue to benefit from skilled PT services to modify and progress therapeutic interventions, address functional mobility deficits, address ROM deficits, address strength deficits, analyze and address soft tissue restrictions, analyze and cue movement patterns, analyze and modify body mechanics/ergonomics and assess and modify postural abnormalities to attain remaining goals. [x]  See Plan of Care  []  See progress note/recertification  []  See Discharge Summary         Progress towards goals / Updated goals:  Short Term Goals: To be accomplished in 2 weeks:               1. Patient will report performance of HEP at least 2 times per day to facilitate improved outcomes and improved self management. Progressing 8/30/2019- Pt reports she only does the exercises 1x a day.     Long Term Goals: To be accomplished in 4 weeks:               1. Patient will report FOTO score of 53 or better to indicate significant improvement in functional status.  Progressing 9/16/19- Foto score 43 (no change from initial score)               2. Pt will demonstrate WNL trunk AROM in all planes to improve ease of daily activity. Progressing 9/16/19 Pt displays AROM WNL but reports pain with flex (6/10) and ext (4/10)               3. Pt will demonstrate ability to perform KB squat lift x 5 with 10# void of pain exacerbation to improve ease of household lifting tasks. Not Met- 9/16/19- Pt reports pain                4. Pt will report pain 2/10 or better to improve QOL.  Progressing 9/5/19- Pt reports the pain on average she is about a 4/10.       PLAN  []  Upgrade activities as tolerated     [x]  Continue plan of care  []  Update interventions per flow sheet       []  Discharge due to:_  []  Other:_      Anushka Silva PTA 9/26/2019  1:52 PM    Future Appointments   Date Time Provider Colt Jovel   9/26/2019  2:30 PM Carmen Jc PTA [facility]   9/30/2019  2:00 PM Carmen Jc PTA [facility]   10/3/2019  2:00 PM Carmen Jc PTA [facility] vascular congestion/edema, consolidating infiltrative changes mid-basilar regions bilaterally worse on the right. Bibasilar pleural effusions suggested. Findings are consistent with congestive heart failure and/or bibasilar pneumonia. No pneumothorax. Findings consistent with congestive heart failure and/or bilateral mid-basilar pneumonia. Small bibasilar pleural effusions suggested. Findings are generally worse on the right. Assessment & Plan:      True Theodore is a 67 y.o. male admitted 2/11/2023 for Shortness of breath. Urology consulted for parkinson catheter placement. 1) Distal Urethral Stricture w Incomplete Bladder Emptying: ASA and Plavix held today. Bladder scan approx. 417ml. 12fr pakrinson catheter placed x1 attempt with clear, dark yellow urine return. BUN: 49 02/13 - 62 02/12; Cr 3.9 02/13 - 4.6 02/12; GFR 16 02/13- 13 02/12. PVR from 02/12 @ 1814 was 125ml. Today there was no urine output recorded per the external catheter. Pt may benefit from Cystoscopy/Urocuff/PVR/TRUS as outpatient if voiding issues persist.   Continue with parkinson catheter. Recommend parkinson removal/voiding trial prior to discharge. Pt stable from a  standpoint. Will sign off, please call with any questions. Pt to follow up in our office in 1 month for reevaluation. Patient seen and examined, chart reviewed.      Electronically signed by Aakash Prince PA-C on 2/13/2023 at 7:53 AM

## 2023-02-13 NOTE — PROGRESS NOTES
CARDIOLOGY  NOTE        Name:  Dusty Santos /Age/Sex: 1950  (67 y.o. male)   MRN & CSN:  [id number] & [id number] Admission Date/Time: 2023 10:42 AM   Location:  [facility] PCP: Yasmine Daniel Day: 3        PLAN FROM CARDIOLOGY FOR TODAY:   Continue supportive care, echocardiogram t today      - cardiology consult is for: Elevated troponin    -  Interval history: On vent now    ASSESSMENT/ PLAN:      - cad patient history of known CAD and stents in all 3 arteries and had cardiac cath done in 2018 which showed the stents are patent  Last Cardiolite done in 2020 showed the EF was 48% showed inferior wall MI no ischemia was seen  Patient's troponin is elevated there probably secondary to type II elevation given that the patient creatinine is for now   - acute kidney injury with the newly elevated creatinine to high level per renal CRRT  -hfpef aggressive diuresis and monitor    -icd last ICD check was on 2023 which showed that the OptiVol was normal   -pvd had multiple interventions done last 1 was done in October of last year   Respiratory failure remains on vent          Subjective: Todays complain: Patient on vent    HPI:  Nicolás Bingham is a 67 y. o.year old who and presents with had concerns including Respiratory Distress. Chief Complaint   Patient presents with    Respiratory Distress           Objective: Temperature:  Current - Temp: 97.7 °F (36.5 °C);  Max - Temp  Av.1 °F (36.7 °C)  Min: 97.7 °F (36.5 °C)  Max: 99.3 °F (37.4 °C)    Respiratory Rate : Resp  Av.1  Min: 14  Max: 18    Pulse Range: Pulse  Av.6  Min: 60  Max: 67    Blood Presuure Range:  Systolic (67BLO), HVF:004 , Min:84 , EGE:917   ; Diastolic (41TQB), FTN:09, Min:49, Max:67      Pulse ox Range: SpO2  Av.4 %  Min: 96 %  Max: 100 %    24hr I & O:    Intake/Output Summary (Last 24 hours) at 2023 7247  Last data filed at 2/13/2023 0711  Gross per 24 hour   Intake 1217.65 ml   Output 3211 ml   Net -1993.35 ml         BP (!) 124/55   Pulse 61   Temp 97.7 °F (36.5 °C) (Rectal)   Resp 16   Ht 6' (1.829 m)   Wt 298 lb 8.1 oz (135.4 kg)   SpO2 100%   BMI 40.48 kg/m²           Review of Systems: On vent    TELEMETRY: Atrial paced rhythm   has a past medical history of Acid reflux, Acute MI ([address] Arthritis, Broken teeth, CAD (coronary artery disease), Cardiomyopathy ([address] Cerebral artery occlusion with cerebral infarction ([address] CHF (congestive heart failure) ([address] Chronic back pain, Chronic kidney disease, Diabetes mellitus ([address] Diabetic neuropathy ([address] H/O cardiovascular stress test, H/O Doppler ultrasound, H/O percutaneous left heart catheterization, History of irregular heartbeat, History of syncope, Hyperlipidemia, Hypertension, Leg swelling, Necrotic toes (HCC), Neuropathy, neuropathy, PAD (peripheral artery disease) ([address] PVD (peripheral vascular disease) ([address] Sick sinus syndrome ([address] Sleep apnea, Spinal stenosis, Teeth missing, Type 2 diabetes mellitus without complication ([address] and WD-Chronic foot ulcer, left, with necrosis of bone ([address] has a past surgical history that includes Coronary angioplasty with stent; Dental surgery; Colonoscopy (08/04/2016); pacemaker placement (06/04/2010); vascular surgery; colectomy (Right, 08/26/2016); Toe amputation (Right, 09/12/2017); Toe amputation (Right, 01/09/2018); Cardiac catheterization; Cardiac defibrillator placement (06/04/2010); Coronary angioplasty; Cardiac catheterization (07/14/2017); Cardiac catheterization (11/20/2018); Toe amputation (Left, 12/26/2020); IR TUNNELED CVC PLACE WO SQ PORT/PUMP > 5 YEARS (6/14/2021); Toe amputation (Left, 7/26/2022); Toe amputation (Right, 10/20/2022); and IR TUNNELED CVC PLACE WO SQ PORT/PUMP > 5 YEARS (11/17/2022).   Physical Exam:  General: On vent  Head:normal  Eye: Pupils equal and round  Neck:  No JVD, no carotid bruit noted   Chest:  Clear to auscultation, no signs of respiratory distress  Cardiovascular:  Normal rate and rhythm. S1 and S2 noted. No murmurs rubs or gallops  Abdomen:   nontender  Extremities:  tr edema  Pulses; palpable  Neuro:  On vent2    Medications:    sodium chloride flush  5-40 mL IntraVENous 2 times per day    cefTRIAXone (ROCEPHIN) IV  1,000 mg IntraVENous Q24H    And    azithromycin  500 mg IntraVENous Q24H    aspirin  81 mg Oral Daily    [Held by provider] clopidogrel  75 mg Oral Daily    ipratropium-albuterol  1 ampule Inhalation Q4H WA    chlorhexidine  15 mL Mouth/Throat BID    famotidine (PEPCID) injection  20 mg IntraVENous Daily      prismaSATE BGK 2/0 1,500 mL/hr at 02/13/23 0616    prismaSATE BGK 4/2.5 200 mL/hr at 02/12/23 0935    prismaSATE BGK 2/0 1,000 mL/hr at 02/13/23 4133    calcium chloride CRRT infusion 80 mL/hr at 02/13/23 0511    heparin 5000 units CRRT syringe      norepinephrine 3 mcg/min (02/13/23 0711)    dextrose      sodium chloride      propofol 20 mcg/kg/min (02/13/23 0711)     potassium chloride, magnesium sulfate, calcium gluconate **OR** calcium gluconate **OR** calcium gluconate **OR** calcium gluconate, sodium phosphate IVPB **OR** sodium phosphate IVPB **OR** sodium phosphate IVPB **OR** sodium phosphate IVPB, heparin 5000 units CRRT syringe, glucose, dextrose bolus **OR** dextrose bolus, glucagon (rDNA), dextrose, sodium chloride flush, sodium chloride    Lab Data:  CBC:   Recent Labs     02/11/23  1047   WBC 4.6   HGB 10.3*   HCT 37.0*   MCV 92.3        BMP:   Recent Labs     02/12/23  0715 02/12/23  0933 02/12/23  1907    137 136   K 6.4* 5.7* 5.1    108 105   CO2 21 21 23   PHOS  --  4.9 4.0   BUN 66* 62* 49*   CREATININE 5.0* 4.6* 3.9*     LIVER PROFILE:   Recent Labs     02/11/23  1047 02/12/23  0933 02/12/23  1907   AST 19 16 18   ALT 12 9* 10   BILITOT 0.5 0.5 0.6   ALKPHOS 101 70 85     PT/INR: No results for input(s): PROTIME, INR in the last 72 hours. APTT: No results for input(s): APTT in the last 72 hours. BNP:  No results for input(s): BNP in the last 72 hours. TROPONIN: No results for input(s): TROPONINI in the last 72 hours. Recent Labs     02/11/23  1047   TROPONINT 0.107*        Labs, consult, tests reviewed                    Stu Ferguson MD, PA-C 2/13/2023 7:33 AM     Please note this report has been partially produced using speech recognition software and may contain errors related to that system including errors in grammar, punctuation, and spelling, as well as words and phrases that may be inappropriate. If there are any questions or concerns please feel free to contact the dictating provider for clarification.

## 2023-02-13 NOTE — PROGRESS NOTES
02/13/23 0803   Patient Observation   Heart Rate 60   Resp 16   SpO2 100 %   Vent Information   Ventilator ID [id number]   Equipment Changed HME   Vent Mode AC/VC   $Ventilation $Subsequent Day   Ventilator Settings   FiO2  40 %   Vt (Set, mL) 500 mL   Resp Rate (Set) 16 bmp   PEEP/CPAP (cmH2O) 5   Pressure Support (cm H2O) 0 cm H2O   Vent Patient Data (Readings)   Vt (Measured) 503 mL   Peak Inspiratory Pressure (cmH2O) 25 cmH2O   Rate Measured 16 br/min   Minute Volume (L/min) 8.02 Liters   Mean Airway Pressure (cmH2O) 11 cmH20   Plateau Pressure (cm H2O) 0 cm H2O   Driving Pressure -5   I:E Ratio 1:2.00   Flow Sensitivity 3 L/min   Backup Apnea On   Backup Rate 16 Breaths Per Minute   Backup Vt   (500)   Vent Alarm Settings   High Pressure (cmH2O) 40 cmH2O   Low Minute Volume (lpm) 2.5 L/min   High Minute Volume (lpm) 20 L/min   Low Exhaled Vt (ml) 250 mL   High Exhaled Vt (ml) 1000 mL   RR High (bpm) 40 br/min   Apnea (secs) 20 secs   Additional Respiratoray Assessments   Humidification Source HME   Ambu Bag With Mask At Bedside Yes   Airway Clearance   Suction ET Tube   Subglottic Suction Done No   Suction Device Inline suction catheter   Sputum Method Obtained Endotracheal   Sputum Amount Moderate   Sputum Consistency Other (comment)   $Obtained Sample $Induced Sputum (charge not used for Bronchoscopy)   ETT    Placement Date/Time: 02/11/23 8381   Placed By: Licensed provider  Placement Verified By: Auscultation; Chest X-ray;Colorimetric ETCO2 device  Preoxygenation: Yes  Mask Ventilation: Mask ventilation not attempted (0)  Technique: Video laryngoscopy  Air. ..    Secured At 25 cm   Measured From Lips   ETT Placement Center   Site Assessment Dry

## 2023-02-13 NOTE — PROGRESS NOTES
V2.0  [facility] [place] [facility] Progress Note      Name:  Carmelita Shin /Age/Sex: 1950  (67 y.o. male)   MRN & CSN:  [id number] & [id number] Encounter Date/Time: 2023 4:09 PM EST    Location:  - PCP: Kameron Isaac Day: 3    Assessment and Plan:   Carmelita Shin is a 67 y.o. male who presents with Acute respiratory failure with hypoxia and hypercapnia (HCC)    Acute renal failure  Acute respiratory failure  Hyperkalemia  Metabolic encephalopathy  Chronic LE wounds   HTN  CAD  Heart failure with preserved ejection fraction     Plan     Neuro - sedated on ventilator    CV - acute on chronic HF. EF 50-55% in . Resp - acute hypoxic hypercapneic resp failure. Intubated. GI - Tolerating tube feed. GI prophylaxis      - NANCY on CKD 3/4. Baseline Cr 2.2. adm Cr 4.8. sCr now 3.3; continue CRRT per nephrology. Urology consulted for difficult parkinson     ID -was started on CAP coverage, will de-escalate as there is no evidence of infection. Heme - HSQ     MSK - LE wounds bilaterally. Wound care consult. Wet-dry for now with xeroform. Diet Diet NPO  ADULT TUBE FEEDING; Nasogastric; Peptide Based High Protein; Continuous; 20; Yes; 10; Q 4 hours; 65; 30; Q 4 hours; Protein; daily, via NGT   DVT Prophylaxis [] Lovenox, [x]  Heparin, [] SCDs, [] Ambulation,  [] Eliquis, [] Xarelto  [] Coumadin   Code Status Full Code   Disposition From: Home  Expected Disposition: TBD  Estimated Date of Discharge: TBD  Patient requires continued admission due to respiratory failure   Surrogate Decision Maker/ POA Child     Subjective:      Patient seen and examined this morning he remains critically ill on the ventilator. He is currently tolerating CRRT. Review of Systems:    Review of Systems    Unable to complete secondary to intubation    Objective:      Intake/Output Summary (Last 24 hours) at 2023 1609  Last data filed at 2023 1500  Gross per 24 hour   Intake 565.77 ml   Output 4004 ml   Net -3438.23 ml        Vitals:   Vitals:    02/13/23 1604   BP:    Pulse: 60   Resp: 14   Temp:    SpO2: 99%       Physical Exam:     General: Ill-appearing male, NAD  Eyes: EOMI  ENT: neck supple, ETT  Cardiovascular: Regular rate. Respiratory: Clear to auscultation, mechanical ventilation  Gastrointestinal: Obese, soft, non tender  Genitourinary: no suprapubic tenderness, Fuller catheter  Musculoskeletal: 2+ lower extremity edema, chronic wounds on bilateral lower extremities  Skin: warm, dry  Neuro: Unresponsive on ventilator.   Psych: Unable to assess    Medications:   Medications:    heparin (porcine)  5,000 Units SubCUTAneous 3 times per day    sodium hypochlorite   Irrigation Daily    collagenase   Topical Daily    sodium chloride flush  5-40 mL IntraVENous 2 times per day    aspirin  81 mg Oral Daily    [Held by provider] clopidogrel  75 mg Oral Daily    ipratropium-albuterol  1 ampule Inhalation Q4H WA    chlorhexidine  15 mL Mouth/Throat BID    famotidine (PEPCID) injection  20 mg IntraVENous Daily      Infusions:    dextrose      prismaSATE BGK 2/0 1,500 mL/hr at 02/13/23 1320    prismaSATE BGK 4/2.5 200 mL/hr at 02/13/23 1018    prismaSATE BGK 2/0 1,000 mL/hr at 02/13/23 1601    calcium chloride CRRT infusion 8,000 mg (02/13/23 1447)    heparin 5000 units CRRT syringe      norepinephrine 2 mcg/min (02/13/23 1419)    dextrose      sodium chloride      propofol 25 mcg/kg/min (02/13/23 1242)     PRN Meds: fentanNYL, 50 mcg, Q1H PRN  midazolam, 1 mg, Q2H PRN  potassium chloride, 20 mEq, PRN  magnesium sulfate, 1,000 mg, PRN  calcium gluconate, 1,000 mg, PRN   Or  calcium gluconate, 2,000 mg, PRN   Or  calcium gluconate, 3,000 mg, PRN   Or  calcium gluconate, 4,000 mg, PRN  sodium phosphate IVPB, 6 mmol, PRN   Or  sodium phosphate IVPB, 12 mmol, PRN   Or  sodium phosphate IVPB, 18 mmol, PRN   Or  sodium phosphate IVPB, 24 mmol, PRN  heparin 5000 units CRRT syringe, , Continuous PRN  glucose, 4 tablet, PRN  dextrose bolus, 125 mL, PRN   Or  dextrose bolus, 250 mL, PRN  glucagon (rDNA), 1 mg, PRN  dextrose, , Continuous PRN  sodium chloride flush, 10 mL, PRN  sodium chloride, , PRN        Labs      Recent Results (from the past 24 hour(s))   POCT Glucose    Collection Time: 02/12/23  4:26 PM   Result Value Ref Range    POC Glucose 91 70 - 99 MG/DL   Comprehensive Metabolic Panel w/ Reflex to MG    Collection Time: 02/12/23  7:07 PM   Result Value Ref Range    Sodium 136 135 - 145 MMOL/L    Potassium 5.1 3.5 - 5.1 MMOL/L    Chloride 105 99 - 110 mMol/L    CO2 23 21 - 32 MMOL/L    BUN 49 (H) 6 - 23 MG/DL    Creatinine 3.9 (H) 0.9 - 1.3 MG/DL    Est, Glom Filt Rate 16 (L) >60 mL/min/1.73m2    Glucose 72 70 - 99 MG/DL    Calcium 7.5 (L) 8.3 - 10.6 MG/DL    Albumin 2.7 (L) 3.4 - 5.0 GM/DL    Total Protein 5.9 (L) 6.4 - 8.2 GM/DL    Total Bilirubin 0.6 0.0 - 1.0 MG/DL    ALT 10 10 - 40 U/L    AST 18 15 - 37 IU/L    Alkaline Phosphatase 85 40 - 128 IU/L    Anion Gap 8 4 - 16   Calcium, Ionized    Collection Time: 02/12/23  7:07 PM   Result Value Ref Range    Ionized Ca 1.04 (L) 1.12 - 1.32 mMOL/L    Calcium, Ionized 4.16 (L) 4.48 - 5.28 MG/DL   Magnesium    Collection Time: 02/12/23  7:07 PM   Result Value Ref Range    Magnesium 2.1 1.8 - 2.4 mg/dl   Phosphorus    Collection Time: 02/12/23  7:07 PM   Result Value Ref Range    Phosphorus 4.0 2.5 - 4.9 MG/DL   Calcium, Ionized    Collection Time: 02/13/23  2:05 AM   Result Value Ref Range    Ionized Ca 1.11 (L) 1.12 - 1.32 mMOL/L    Calcium, Ionized 4.44 (L) 4.48 - 5.28 MG/DL   Lactic Acid    Collection Time: 02/13/23  7:07 AM   Result Value Ref Range    Lactate 1.5 0.5 - 1.9 mMOL/L   Comprehensive Metabolic Panel w/ Reflex to MG    Collection Time: 02/13/23  7:07 AM   Result Value Ref Range    Sodium 137 135 - 145 MMOL/L    Potassium 4.4 3.5 - 5.1 MMOL/L    Chloride 105 99 - 110 mMol/L    CO2 24 21 - 32 MMOL/L    BUN 38 (H) 6 - 23 MG/DL     Creatinine 3.3 (H) 0.9 - 1.3 MG/DL    Est, Glom Filt Rate 19 (L) >60 mL/min/1.73m2    Glucose 66 (L) 70 - 99 MG/DL    Calcium 7.7 (L) 8.3 - 10.6 MG/DL    Albumin 2.5 (L) 3.4 - 5.0 GM/DL    Total Protein 5.6 (L) 6.4 - 8.2 GM/DL    Total Bilirubin 0.7 0.0 - 1.0 MG/DL    ALT 10 10 - 40 U/L    AST 18 15 - 37 IU/L    Alkaline Phosphatase 76 40 - 128 IU/L    Anion Gap 8 4 - 16   Calcium, Ionized    Collection Time: 02/13/23  7:07 AM   Result Value Ref Range    Ionized Ca 1.15 1.12 - 1.32 mMOL/L    Calcium, Ionized 4.60 4.48 - 5.28 MG/DL   Magnesium    Collection Time: 02/13/23  7:07 AM   Result Value Ref Range    Magnesium 1.9 1.8 - 2.4 mg/dl   Phosphorus    Collection Time: 02/13/23  7:07 AM   Result Value Ref Range    Phosphorus 3.4 2.5 - 4.9 MG/DL   CBC with Auto Differential    Collection Time: 02/13/23  8:45 AM   Result Value Ref Range    WBC 4.7 4.0 - 10.5 K/CU MM    RBC 3.08 (L) 4.6 - 6.2 M/CU MM    Hemoglobin 7.9 (L) 13.5 - 18.0 GM/DL    Hematocrit 26.4 (L) 42 - 52 %    MCV 85.7 78 - 100 FL    MCH 25.6 (L) 27 - 31 PG    MCHC 29.9 (L) 32.0 - 36.0 %    RDW 18.2 (H) 11.7 - 14.9 %    Platelets 730 (L) 757 - 440 K/CU MM    MPV 9.9 7.5 - 11.1 FL    Differential Type AUTOMATED DIFFERENTIAL     Segs Relative 62.5 36 - 66 %    Lymphocytes % 22.7 (L) 24 - 44 %    Monocytes % 12.0 (H) 0 - 4 %    Eosinophils % 1.5 0 - 3 %    Basophils % 1.1 (H) 0 - 1 %    Segs Absolute 2.9 K/CU MM    Lymphocytes Absolute 1.1 K/CU MM    Monocytes Absolute 0.6 K/CU MM    Eosinophils Absolute 0.1 K/CU MM    Basophils Absolute 0.1 K/CU MM    Nucleated RBC % 1.1 %    Total Nucleated RBC 0.1 K/CU MM    Total Immature Neutrophil 0.01 K/CU MM    Immature Neutrophil % 0.2 0 - 0.43 %   Legionella antigen, urine    Collection Time: 02/13/23 11:50 AM    Specimen: Urine   Result Value Ref Range    Legionella Urinary Ag NEGATIVE NEGATIVE   Strep Pneumoniae Antigen    Collection Time: 02/13/23 11:50 AM    Specimen: CSF   Result Value Ref Range    Strep pneumo Ag URINE NEGATIVE    Urinalysis with Reflex to Culture    Collection Time: 02/13/23 11:50 AM    Specimen: Urine   Result Value Ref Range    Color, UA YELLOW YELLOW    Clarity, UA CLEAR CLEAR    Glucose, Urine NEGATIVE NEGATIVE MG/DL    Bilirubin Urine MODERATE NUMBER OR AMOUNT OBSERVED (A) NEGATIVE MG/DL    Ketones, Urine TRACE (A) NEGATIVE MG/DL    Specific Gravity, UA 1.025 1.001 - 1.035    Blood, Urine NEGATIVE NEGATIVE    pH, Urine 5.0 5.0 - 8.0    Protein, UA 30 (A) NEGATIVE MG/DL    Urobilinogen, Urine 1.0 0.2 - 1.0 MG/DL    Nitrite Urine, Quantitative NEGATIVE NEGATIVE    Leukocyte Esterase, Urine TRACE (A) NEGATIVE    RBC, UA 4 (H) 0 - 3 /HPF    WBC, UA <1 0 - 2 /HPF    Bacteria, UA NEGATIVE NEGATIVE /HPF    Squam Epithel, UA 7 /HPF    Mucus, UA RARE (A) NEGATIVE HPF    Sperm, UA RARE /HFP    Trichomonas, UA NONE SEEN NONE SEEN /HPF    Hyaline Casts, UA >20 /LPF   Calcium, Ionized    Collection Time: 02/13/23  1:40 PM   Result Value Ref Range    Ionized Ca 1.14 1.12 - 1.32 mMOL/L    Calcium, Ionized 4.56 4.48 - 5.28 MG/DL   POCT Glucose    Collection Time: 02/13/23  3:00 PM   Result Value Ref Range    POC Glucose 67 (L) 70 - 99 MG/DL        Imaging/Diagnostics Last 24 Hours   XR CHEST PORTABLE    Result Date: 2/11/2023  EXAMINATION: ONE XRAY VIEW OF THE CHEST 2/11/2023 7:29 pm COMPARISON: Chest radiograph 02/11/2023 HISTORY: ORDERING SYSTEM PROVIDED HISTORY: ETT placement TECHNOLOGIST PROVIDED HISTORY: Reason for exam:->ETT placement Reason for Exam: et and og tube placement confirmation Additional signs and symptoms: et and og tube placement confrimation FINDINGS: Lines and tubes: -ETT terminates at the superior margin of the clavicles. -enteric tube takes a the subdiaphragmatic course and terminates out of the field of view -right-sided Cordis catheter, which terminates within the mid/upper SVC Lungs:  There is indistinctness of the pulmonary vasculature with patchy opacities within the right greater than left lungs. . Pleura: Small right-sided pleural effusion. Cardiomediastinal silhouette: Enlarged cardiac silhouette. Bones: No acute osseous findings. Soft tissues: Left-sided 2 lead cardiac device. Lines and tubes as above. The ETT terminates at the level of the superior margin of the clavicles. Grossly unchanged pulmonary exam.  Findings favor cardiogenic pulmonary edema. However a superimposed infectious process cannot be excluded.      Total critical care time 36 minutes excluding all billable procedures    Electronically signed by MICKY Naidu CNP on 2/13/2023 at 4:09 PM

## 2023-02-13 NOTE — CARE COORDINATION
Case Management Assessment  Initial Evaluation    Date/Time of Evaluation: 2/13/2023 11:45 AM  Assessment Completed by: Sammy Linn RN    If patient is discharged prior to next notation, then this note serves as note for discharge by case management. Patient Name: Judd Silva                   YOB: 1950  Diagnosis: Hyperkalemia [E87.5]  Elevated troponin [R77.8]  Acidosis, metabolic, with respiratory acidosis [E87.4]  Acute respiratory failure with hypoxia and hypercapnia ([address] [J96.01, J96.02]  Acute on chronic heart failure, unspecified heart failure type ([address] [I50.9]  Acute kidney injury superimposed on CKD ([address] [N17.9, N18.9]  Acute kidney injury superimposed on chronic kidney disease ([address] [N17.9, N18.9]                   Date / Time: 2/11/2023 10:42 AM    Patient Admission Status: Inpatient   Readmission Risk (Low < 19, Mod (19-27), High > 27): Readmission Risk Score: 23.4    Current PCP: Livan Workman  PCP verified by CM? Yes    Chart Reviewed: Yes      History Provided by: Child/Family, Medical Record  Patient Orientation: Sedated, Unresponsive, Other (see comment) (Intubated)    Patient Cognition: Other (see comment)    Hospitalization in the last 30 days (Readmission):  No    If yes, Readmission Assessment in CM Navigator will be completed.     Advance Directives:      Code Status: Full Code   Patient's Primary Decision Maker is: Legal Next of Kin    Primary Decision MakerTrent Gilbert Child - [phone]    Discharge Planning:    Patient lives with:   Type of Home:    Primary Care Giver: Family  Patient Support Systems include: Children, Family Members   Current Financial resources: Medicaid, Medicare  Current community resources: None  Current services prior to admission:              Current DME:              Type of Home Care services:       ADLS  Prior functional level: Assistance with the following:, Housework, Shopping, Mobility, Cooking  Current functional level: Assistance with the following:, Bathing, Dressing, Toileting, Feeding, Mobility    PT AM-PAC:   /24  OT AM-PAC:   /24    Family can provide assistance at DC: Yes  Would you like Case Management to discuss the discharge plan with any other family members/significant others, and if so, who? Yes  Plans to Return to Present Housing: Unknown at present (Will address after extubation)  Other Identified Issues/Barriers to RETURNING to current housing: none  Potential Assistance needed at discharge:              Potential DME:    Patient expects to discharge to:    Plan for transportation at discharge:      Financial    Payor: Raymundo Lambert / Plan: Mary Mendoza / Product Type: *No Product type* /     Does insurance require precert for SNF: Yes    Potential assistance Purchasing Medications:    Meds-to-Beds request:        [address] [place] [address] [address] [phone] - F [phone] [address] [place] [address]  Phone: [phone] Fax: [phone]      Notes:    Factors facilitating achievement of predicted outcomes: Family support and Has needed Durable Medical Equipment at home    Barriers to discharge: Decreased endurance    Additional Case Management Notes: The Plan for Transition of Care is related to the following treatment goals of Hyperkalemia [E87.5]  Elevated troponin [R77.8]  Acidosis, metabolic, with respiratory acidosis [E87.4]  Acute respiratory failure with hypoxia and hypercapnia (HCC) [J96.01, J96.02]  Acute on chronic heart failure, unspecified heart failure type ([facility] [I50.9]  Acute kidney injury superimposed on CKD ([address] [N17.9, N18.9]  Acute kidney injury superimposed on chronic kidney disease ([address] [N17.9, Z64.4]    IF APPLICABLE: The Patient and/or patient representative Viky Nolan and his family were provided with a choice of provider and agrees with the discharge plan.  Freedom of choice list with basic dialogue that supports the patient's individualized plan of care/goals and shares the quality data associated with the providers was provided to:     Patient Representative Name:       The Patient and/or Patient Representative Agree with the Discharge Plan?       Laura May RN  Case Management Department  Ph: [phone] Fax: [phone]

## 2023-02-13 NOTE — CONSULTS
Department of GeneralSurgery   Surgical Service Dr Neema Aquino   Consult Note    Date of Consult: 2/13/23  Critical care consult time : 35 min    Reason for Consult:  BLE wounds  Requesting Physician:  hospitalist    CHIEF COMPLAINT:  CHF exacerbation, ESRD,  and chronic BLE wounds    History Obtained From:  electronic medical record    HISTORY OF PRESENT ILLNESS:                The patient is a 67 y.o. male who presents with CHF exacerbation and ESRD requiting intubation and currently on CRRT on pressors. Pt intubated and not following command. He is known to me for his bilateral feet wounds and multiple toe amputations. Pt is in critical condition. Wound care has seen and evaluated wounds. Past Medical History:    Past Medical History:   Diagnosis Date    Acid reflux     Acute MI ([address] 2004, 2008    Arthritis     Back    Broken teeth     Upper Front    CAD (coronary artery disease)     Sees Dr. Dafne Trammell [facility])     per old chart    Cerebral artery occlusion with cerebral infarction ([facility]     CHF (congestive heart failure) (HCC)     Chronic back pain     Chronic kidney disease     STAGE 3 KIDNEY FAILURE- \"from my diabetes- do not follow with any one- have seen Dr Sharonda Martínez in the past\"    Diabetes mellitus [facility]) Dx 1965    per old chart pt has been diabetic since age 13    Diabetic neuropathy ([facility]     \"in my feet\"    H/O cardiovascular stress test 08/25/2016    H/O Doppler ultrasound 09/27/2018    Moderate disease of the right lower extremity with an JALEN of 0.72. Moderate to severe disease of the left lower extremity with an JALEN of 0.55.     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    Neuropathy     both feet    neuropathy     PAD (peripheral artery disease) ([facility] 09/27/2018    PVD (peripheral vascular disease) ([facility]     Sick sinus syndrome (HCC)     Sleep apnea     \"sleep study 3 yrs ago- could not tolerate the cpap made me too dry\"    Spinal stenosis     Teeth missing     Upper And Lower    Type 2 diabetes mellitus without complication ([facility]     WD-Chronic foot ulcer, left, with necrosis of bone ([address] 11/12/2021       Past Surgical History:    Past Surgical History:   Procedure Laterality Date    CARDIAC CATHETERIZATION      per old chart done 10/2014    CARDIAC CATHETERIZATION  07/14/2017    with angiography of leg    CARDIAC CATHETERIZATION  11/20/2018    PATENT STENTS OF ALL THREE MAJOR VESSELS    CARDIAC DEFIBRILLATOR PLACEMENT  06/04/2010    [employer] Secura  Defibrillator Implanted    COLECTOMY Right 08/26/2016    laparascopic; robotic assisted    COLONOSCOPY  08/04/2016    CORONARY ANGIOPLASTY      \"15 Heart Stents\"    CORONARY ANGIOPLASTY WITH STENT PLACEMENT      per old chart had angio with stent to circumflex and obtuse marginal artery at [facility] 5/2010( old chart also gives hx of stent placement done 2000,2004 and 2005)    DENTAL SURGERY      Teeth Extracted In Past    IR TUNNELED CATHETER PLACEMENT GREATER THAN 5 YEARS  6/14/2021    IR TUNNELED CATHETER PLACEMENT GREATER THAN 5 YEARS [facility]    IR TUNNELED CATHETER PLACEMENT GREATER THAN 5 YEARS  11/17/2022    IR TUNNELED CATHETER PLACEMENT GREATER THAN 5 YEARS 11/17/2022 [facility] SPECIAL PROCEDURES    PACEMAKER PLACEMENT  06/04/2010    Medtronic Secura DR Defibrillator Implanted    TOE AMPUTATION Right 09/12/2017    Rt 3rd toe    TOE AMPUTATION Right 01/09/2018     Right 5th toe amputation and Toenails trimmed left 2,3,4 and 5th toes    TOE AMPUTATION Left 12/26/2020    LEFT GREAT TOE AMPUTATION performed by Marina Garay MD at [address] [facility] AMPUTATION Left 7/26/2022    LEFT SECOND TOE AMPUTATION performed by Khadra Gillespie MD at [address] Right 10/20/2022    Right First TOE AMPUTATION performed by Khadra Gillespie MD at [address] [place] [address]      per old chart had balloon angioplasty right superfical femoral artery,right popliteal artery,,right ant.tibial artery, right tibioperoneal trunk, and right post.tibial artery wna stent placement right popliteal artery and superfical femoral artery 7/2012       Current Medications:   Current Facility-Administered Medications   Medication Dose Route Frequency Provider Last Rate Last Admin    fentaNYL (SUBLIMAZE) injection 50 mcg  50 mcg IntraVENous Q1H PRN Kassidy Luis Enriquea Parveen, APRN - CNP        midazolam PF (VERSED) injection 1 mg  1 mg IntraVENous Q2H PRN Kassidy Luis Enriquea Parveen, APRN - CNP        heparin (porcine) injection 5,000 Units  5,000 Units SubCUTAneous 3 times per day Gurpreetcheyenne Hutchinson APRN - CNP   5,000 Units at 02/13/23 1237    sodium hypochlorite (DAKINS) 0.125 % external solution   Irrigation Daily Kassidy Saini, APRN - CNP        collagenase ointment   Topical Daily Kassidy Saini, APRN - CNP        prismaSATE BGK 2/0 dialysis solution   Dialysis Continuous Delmy Rodarte MD 1,500 mL/hr at 02/13/23 1320 New Bag at 02/13/23 [address] 4/2.5 dialysis solution   Dialysis Continuous Madhu De La Garza  mL/hr at 02/13/23 1018 New Bag at 02/13/23 227 [facility] 2/0 dialysis solution   Dialysis Continuous Madhu De La Garza MD 1,000 mL/hr at 02/13/23 1016 New Bag at 02/13/23 1016    potassium chloride 20 mEq/50 mL IVPB (Central Line)  20 mEq IntraVENous PRN Madhu De La Garza MD        magnesium sulfate 1000 mg in dextrose 5% 100 mL IVPB  1,000 mg IntraVENous PRN Madhu De La Garza MD        calcium chloride 8,000 mg in sodium chloride 0.9 % 1,000 mL infusion  8 g IntraVENous Continuous Delmy Rodarte MD 80 mL/hr at 02/13/23 1447 8,000 mg at 02/13/23 1447    calcium gluconate 1,000 mg in sodium chloride 50 mL  1,000 mg IntraVENous PRN Madhu Herrera MD        Or    calcium gluconate 2,000 mg in sodium chloride 100 mL  2,000 mg IntraVENous PRN Madhu Herrera MD        Or    calcium gluconate 3,000 mg in sodium chloride 0.9 % 100 mL IVPB  3,000 mg IntraVENous PRN Madhu Herrera MD        Or    calcium gluconate 4,000 mg in sodium chloride 0.9 % 100 mL IVPB  4,000 mg IntraVENous PRN Madhu Herrera MD        sodium phosphate 6 mmol in sodium chloride 0.9 % 250 mL IVPB  6 mmol IntraVENous PRN Madhu Herrera MD        Or    sodium phosphate 12 mmol in sodium chloride 0.9 % 250 mL IVPB  12 mmol IntraVENous PRN Madhu Herrera MD        Or    sodium phosphate 18 mmol in sodium chloride 0.9 % 500 mL IVPB  18 mmol IntraVENous PRN Madhu Herrera MD        Or    sodium phosphate 24 mmol in sodium chloride 0.9 % 500 mL IVPB  24 mmol IntraVENous PRN Madhu Herrera MD        heparin (porcine) 5,000 Units in sodium chloride 0.9 % 20 mL infusion   Dialysis Continuous PRN Madhu Herrera MD        norepinephrine (LEVOPHED) 16 mg in sodium chloride 0.9 % 250 mL infusion  1-100 mcg/min IntraVENous Continuous Madhu Herrera MD 1.9 mL/hr at 02/13/23 1419 2 mcg/min at 02/13/23 1419    glucose chewable tablet 16 g  4 tablet Oral PRN Joe Bailey MD        dextrose bolus 10% 125 mL  125 mL IntraVENous PRN Joe Bailey MD   Stopped at 02/12/23 0022    Or    dextrose bolus 10% 250 mL  250 mL IntraVENous PRMARLENE Bailey MD        glucagon (rDNA) injection 1 mg  1 mg SubCUTAneous PRMARLENE Bailey MD        dextrose 10 % infusion   IntraVENous Continuous PRN Joe Bailey MD        sodium chloride flush 0.9 % injection 5-40 mL  5-40 mL IntraVENous 2 times per day Conrad Frankel, APRN - CNP   10 mL at 02/13/23 0855    sodium chloride flush 0.9 % injection 10 mL  10 mL IntraVENous PRN Conrad Frankel, APRN - CNP   10 mL at 02/12/23 0991    0.9 % sodium chloride infusion IntraVENous PRN Willie Valencia APRN - CNP        aspirin chewable tablet 81 mg  81 mg Oral Daily Willie Valencia APRN - CNP   81 mg at 02/12/23 0949    [Held by provider] clopidogrel (PLAVIX) tablet 75 mg  75 mg Oral Daily Willie Valencia APRN - CNP   75 mg at 02/12/23 0949    ipratropium-albuterol (DUONEB) nebulizer solution 1 ampule  1 ampule Inhalation Q4H WA Willie Valencia APRN - CNP   1 ampule at 02/13/23 1129    chlorhexidine (PERIDEX) 0.12 % solution 15 mL  15 mL Mouth/Throat BID Willie Valencia APRN - CNP   15 mL at 02/13/23 0855    famotidine (PEPCID) 20 mg in sodium chloride (PF) 0.9 % 10 mL injection  20 mg IntraVENous Daily MICKY Aceves - CNP   20 mg at 02/13/23 0855    propofol injection  5-80 mcg/kg/min IntraVENous Continuous Zohreh Moeller APRMARLENE - CNP 17.7 mL/hr at 02/13/23 1242 25 mcg/kg/min at 02/13/23 1242       Allergies:  Pcn [penicillins] and Fentanyl    Social History:   Social History     Socioeconomic History    Marital status:    Tobacco Use    Smoking status: Some Days     Types: Cigars    Smokeless tobacco: Never    Tobacco comments:     Client states he has stopped smoking   Vaping Use    Vaping Use: Never used   Substance and Sexual Activity    Alcohol use: No    Drug use: Yes     Types: Marijuana Helga Coil)    Sexual activity: Never       Family History:   Family History   Problem Relation Age of Onset    Diabetes Mother     Stroke Mother     High Blood Pressure Mother     Vision Loss Mother     Cancer Father         Prostate Cancer    Diabetes Sister     Neuropathy Sister     Other Sister         \"Breathing Problems\"    Heart Disease Sister     Early Death Sister 62        Heart Complications    Cancer Brother         \"Stomach Cancer\"    High Blood Pressure Brother     Diabetes Brother     Heart Disease Brother     High Blood Pressure Brother     Cancer Son         \"Testicle Cancer\"       REVIEW OFSYSTEMS:    Review of Systems   Unable to perform ROS: Intubated   Skin:  Positive for wound. PHYSICAL EXAM:  Vitals:    02/13/23 1200 02/13/23 1211 02/13/23 1300 02/13/23 1400   BP: (!) 109/58  (!) 100/50 (!) 131/58   Pulse: 60  60 60   Resp: 17  16 16   Temp: 96.8 °F (36 °C)      TempSrc: Rectal      SpO2: 100%  97% 100%   Weight:       Height:  6' (1.829 m)         Physical Exam  Constitutional:       General: He is in acute distress. Appearance: He is well-developed. HENT:      Head: Normocephalic. Eyes:      Pupils: Pupils are equal, round, and reactive to light. Cardiovascular:      Rate and Rhythm: Normal rate. Pulmonary:      Effort: Pulmonary effort is normal.      Comments: On vent  Abdominal:      General: There is no distension. Palpations: Abdomen is soft. There is no mass. Tenderness: There is no abdominal tenderness. There is no guarding or rebound. Musculoskeletal:         General: Normal range of motion. Cervical back: Normal range of motion and neck supple. Feet:    Skin:     General: Skin is warm.    Neurological:      Comments: sedated         DATA:    CBC with Differential:    Lab Results   Component Value Date/Time    WBC 4.7 02/13/2023 08:45 AM    RBC 3.08 02/13/2023 08:45 AM    HGB 7.9 02/13/2023 08:45 AM    HCT 26.4 02/13/2023 08:45 AM     02/13/2023 08:45 AM    MCV 85.7 02/13/2023 08:45 AM    MCH 25.6 02/13/2023 08:45 AM    MCHC 29.9 02/13/2023 08:45 AM    RDW 18.2 02/13/2023 08:45 AM    SEGSPCT 62.5 02/13/2023 08:45 AM    LYMPHOPCT 22.7 02/13/2023 08:45 AM    MONOPCT 12.0 02/13/2023 08:45 AM    EOSPCT 1.5 09/08/2011 06:02 PM    BASOPCT 1.1 02/13/2023 08:45 AM    MONOSABS 0.6 02/13/2023 08:45 AM    LYMPHSABS 1.1 02/13/2023 08:45 AM    EOSABS 0.1 02/13/2023 08:45 AM    BASOSABS 0.1 02/13/2023 08:45 AM    DIFFTYPE AUTOMATED DIFFERENTIAL 02/13/2023 08:45 AM     CMP:    Lab Results   Component Value Date/Time     02/13/2023 07:07 AM    K 4.4 02/13/2023 07:07 AM    K 5.1 01/10/2018 04:32 AM     02/13/2023 07:07 AM    CO2 24 02/13/2023 07:07 AM    BUN 38 02/13/2023 07:07 AM    CREATININE 3.3 02/13/2023 07:07 AM    GFRAA 25 10/17/2022 06:50 AM    LABGLOM 19 02/13/2023 07:07 AM    GLUCOSE 66 02/13/2023 07:07 AM    PROT 5.6 02/13/2023 07:07 AM    PROT 6.3 01/21/2013 12:10 PM    LABALBU 2.5 02/13/2023 07:07 AM    CALCIUM 7.7 02/13/2023 07:07 AM    BILITOT 0.7 02/13/2023 07:07 AM    ALKPHOS 76 02/13/2023 07:07 AM    AST 18 02/13/2023 07:07 AM    ALT 10 02/13/2023 07:07 AM   Media Information    Media Information        IMPRESSION:        Patient Active Problem List:     PAD (peripheral artery disease) ([facility]     Chronic coronary artery disease     Biventricular ICD (implantable cardioverter-defibrillator) in place     Chronic combined systolic and diastolic heart failure ([facility])     Chronic kidney disease, stage III (moderate) ([facility])     Mixed hyperlipidemia     Sick sinus syndrome ([facility]     Type 2 diabetes mellitus with diabetic polyneuropathy ([facility]     Spinal stenosis of lumbar region     Obesity, Class I, BMI 30-34.9     S/P partial colectomy     Tubulovillous adenoma of colon     Microalbuminuria     WD-PVD (peripheral vascular disease) ([facility])     Limb ischemia     Necrotic toes ([facility])     Toe gangrene ([facility]     Diabetic foot infection ([facility]     Chronic kidney disease (CKD) stage G3a/A2, moderately decreased glomerular filtration rate (GFR) between 45-59 mL/min/1.73 square meter and albuminuria creatinine ratio between  mg/g ([facility])     Edema     ICD (implantable cardioverter-defibrillator) battery depletion     Hyperkalemia     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[facility])     Ischemia of toe     Acute kidney injury ([facility]     Fluid overload     DM (diabetes mellitus) ([facility]     Precordial pain     Acute chest pain     Unstable angina ([facility])     Chronic kidney disease (CKD) stage G3a/A3, moderately decreased glomerular filtration rate (GFR) between 45-59 mL/min/1.73 square meter and albuminuria creatinine ratio greater than 300 mg/g ([facility]     Cardiomyopathy ([facility] Diabetic neuropathy (HCC)     HTN (hypertension)     Epigastric pain     Acute on chronic congestive heart failure (HCC)     Leg edema     Acute on chronic systolic CHF (congestive heart failure) (HCC)     Diabetic foot ulcer with osteomyelitis ([address]     Visit for wound check     Moderate malnutrition ([address]     Long term (current) use of antibiotics     WD-Diabetic ulcer of toe of right foot associated with type 2 diabetes mellitus, with fat layer exposed ([address]     Diabetic ulcer of toe associated with type 2 diabetes mellitus, with bone involvement without evidence of necrosis ([address]     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     Persistent wound pain     Receiving intravenous antibiotic treatment as outpatient     Acute on chronic respiratory failure with hypoxemia ([address]     WD-Chronic foot ulcer, left, with necrosis of bone (HCC)     Acute on chronic HFrEF (heart failure with reduced ejection fraction) ([facility])     Scrotal edema     Hypertensive urgency     Diabetic ulcer of right foot due to type 2 diabetes mellitus ([address]     Cellulitis of foot     Toe osteomyelitis (HCC)     Heart failure exacerbated by sotalol (HCC)     CHF (congestive heart failure), NYHA class I, acute on chronic, combined (HCC)     Acute on chronic diastolic CHF (congestive heart failure) (HCC)     Leg swelling     Acute on chronic diastolic heart failure (HCC)     Skin ulcer of left foot including toes with fat layer exposed ([address]     Superficial incisional surgical site infection     Bacteremia due to Enterococcus     Acute respiratory failure with hypoxia and hypercapnia (HCC)     Acute kidney injury superimposed on CKD ([address]     Diabetic foot ([address]     Diabetic ulcer of midfoot associated with diabetes mellitus due to underlying condition, with muscle involvement without evidence of necrosis ([address]      PLAN:    Agree with wound care recommendation for daily local wound care.  Left great toe open wound site could use some debridement but will use dakins to control bacterial burden. Prognosis is poor given multiorgan failure.          Shawn Hernandez MD

## 2023-02-14 PROBLEM — G40.89 OTHER SEIZURES (HCC): Status: ACTIVE | Noted: 2023-02-14

## 2023-02-14 LAB
ALBUMIN SERPL-MCNC: 2.2 GM/DL (ref 3.4–5)
ALBUMIN SERPL-MCNC: 2.3 GM/DL (ref 3.4–5)
ALP BLD-CCNC: 70 IU/L (ref 40–128)
ALP BLD-CCNC: 71 IU/L (ref 40–128)
ALT SERPL-CCNC: 8 U/L (ref 10–40)
ALT SERPL-CCNC: 8 U/L (ref 10–40)
ANION GAP SERPL CALCULATED.3IONS-SCNC: 5 MMOL/L (ref 4–16)
ANION GAP SERPL CALCULATED.3IONS-SCNC: 5 MMOL/L (ref 4–16)
AST SERPL-CCNC: 14 IU/L (ref 15–37)
AST SERPL-CCNC: 14 IU/L (ref 15–37)
BASOPHILS ABSOLUTE: 0 K/CU MM
BASOPHILS RELATIVE PERCENT: 0.5 % (ref 0–1)
BILIRUB SERPL-MCNC: 0.8 MG/DL (ref 0–1)
BILIRUB SERPL-MCNC: 0.8 MG/DL (ref 0–1)
BUN SERPL-MCNC: 20 MG/DL (ref 6–23)
BUN SERPL-MCNC: 23 MG/DL (ref 6–23)
CALCIUM IONIZED: 4.76 MG/DL (ref 4.48–5.28)
CALCIUM IONIZED: 4.96 MG/DL (ref 4.48–5.28)
CALCIUM IONIZED: 5.04 MG/DL (ref 4.48–5.28)
CALCIUM IONIZED: 5.16 MG/DL (ref 4.48–5.28)
CALCIUM SERPL-MCNC: 8.4 MG/DL (ref 8.3–10.6)
CALCIUM SERPL-MCNC: 8.4 MG/DL (ref 8.3–10.6)
CHLORIDE BLD-SCNC: 103 MMOL/L (ref 99–110)
CHLORIDE BLD-SCNC: 104 MMOL/L (ref 99–110)
CO2: 26 MMOL/L (ref 21–32)
CO2: 26 MMOL/L (ref 21–32)
CREAT SERPL-MCNC: 2.1 MG/DL (ref 0.9–1.3)
CREAT SERPL-MCNC: 2.2 MG/DL (ref 0.9–1.3)
DIFFERENTIAL TYPE: ABNORMAL
EOSINOPHILS ABSOLUTE: 0.1 K/CU MM
EOSINOPHILS RELATIVE PERCENT: 1.9 % (ref 0–3)
GFR SERPL CREATININE-BSD FRML MDRD: 31 ML/MIN/1.73M2
GFR SERPL CREATININE-BSD FRML MDRD: 33 ML/MIN/1.73M2
GLUCOSE BLD-MCNC: 105 MG/DL (ref 70–99)
GLUCOSE BLD-MCNC: 117 MG/DL (ref 70–99)
GLUCOSE BLD-MCNC: 87 MG/DL (ref 70–99)
GLUCOSE BLD-MCNC: 97 MG/DL (ref 70–99)
GLUCOSE SERPL-MCNC: 120 MG/DL (ref 70–99)
GLUCOSE SERPL-MCNC: 90 MG/DL (ref 70–99)
HBV CORE AB SERPL QL IA: NEGATIVE
HCT VFR BLD CALC: 25.8 % (ref 42–52)
HEMOGLOBIN: 7.7 GM/DL (ref 13.5–18)
IMMATURE NEUTROPHIL %: 0.2 % (ref 0–0.43)
IONIZED CA: 1.19 MMOL/L (ref 1.12–1.32)
IONIZED CA: 1.24 MMOL/L (ref 1.12–1.32)
IONIZED CA: 1.26 MMOL/L (ref 1.12–1.32)
IONIZED CA: 1.29 MMOL/L (ref 1.12–1.32)
LYMPHOCYTES ABSOLUTE: 0.9 K/CU MM
LYMPHOCYTES RELATIVE PERCENT: 21.2 % (ref 24–44)
MAGNESIUM: 1.8 MG/DL (ref 1.8–2.4)
MAGNESIUM: 2 MG/DL (ref 1.8–2.4)
MCH RBC QN AUTO: 25.6 PG (ref 27–31)
MCHC RBC AUTO-ENTMCNC: 29.8 % (ref 32–36)
MCV RBC AUTO: 85.7 FL (ref 78–100)
MONOCYTES ABSOLUTE: 0.5 K/CU MM
MONOCYTES RELATIVE PERCENT: 11.3 % (ref 0–4)
NUCLEATED RBC %: 0.7 %
PDW BLD-RTO: 18.2 % (ref 11.7–14.9)
PHOSPHORUS: 3 MG/DL (ref 2.5–4.9)
PHOSPHORUS: 3.1 MG/DL (ref 2.5–4.9)
PLATELET # BLD: 104 K/CU MM (ref 140–440)
PMV BLD AUTO: 9.8 FL (ref 7.5–11.1)
POTASSIUM SERPL-SCNC: 3.7 MMOL/L (ref 3.5–5.1)
POTASSIUM SERPL-SCNC: 3.8 MMOL/L (ref 3.5–5.1)
RBC # BLD: 3.01 M/CU MM (ref 4.6–6.2)
SEGMENTED NEUTROPHILS ABSOLUTE COUNT: 2.7 K/CU MM
SEGMENTED NEUTROPHILS RELATIVE PERCENT: 64.9 % (ref 36–66)
SODIUM BLD-SCNC: 134 MMOL/L (ref 135–145)
SODIUM BLD-SCNC: 135 MMOL/L (ref 135–145)
TOTAL IMMATURE NEUTOROPHIL: 0.01 K/CU MM
TOTAL NUCLEATED RBC: 0 K/CU MM
TOTAL PROTEIN: 5.1 GM/DL (ref 6.4–8.2)
TOTAL PROTEIN: 5.2 GM/DL (ref 6.4–8.2)
WBC # BLD: 4.2 K/CU MM (ref 4–10.5)

## 2023-02-14 PROCEDURE — 2700000000 HC OXYGEN THERAPY PER DAY

## 2023-02-14 PROCEDURE — 94761 N-INVAS EAR/PLS OXIMETRY MLT: CPT

## 2023-02-14 PROCEDURE — 6370000000 HC RX 637 (ALT 250 FOR IP): Performed by: NURSE PRACTITIONER

## 2023-02-14 PROCEDURE — 2500000003 HC RX 250 WO HCPCS: Performed by: INTERNAL MEDICINE

## 2023-02-14 PROCEDURE — 89220 SPUTUM SPECIMEN COLLECTION: CPT

## 2023-02-14 PROCEDURE — 6360000002 HC RX W HCPCS: Performed by: NURSE PRACTITIONER

## 2023-02-14 PROCEDURE — 87181 SC STD AGAR DILUTION PER AGT: CPT

## 2023-02-14 PROCEDURE — 2500000003 HC RX 250 WO HCPCS: Performed by: SPECIALIST

## 2023-02-14 PROCEDURE — 87077 CULTURE AEROBIC IDENTIFY: CPT

## 2023-02-14 PROCEDURE — 95717 EEG PHYS/QHP 2-12 HR W/O VID: CPT | Performed by: STUDENT IN AN ORGANIZED HEALTH CARE EDUCATION/TRAINING PROGRAM

## 2023-02-14 PROCEDURE — 80053 COMPREHEN METABOLIC PANEL: CPT

## 2023-02-14 PROCEDURE — 99232 SBSQ HOSP IP/OBS MODERATE 35: CPT | Performed by: INTERNAL MEDICINE

## 2023-02-14 PROCEDURE — 94640 AIRWAY INHALATION TREATMENT: CPT

## 2023-02-14 PROCEDURE — 87070 CULTURE OTHR SPECIMN AEROBIC: CPT

## 2023-02-14 PROCEDURE — 2500000003 HC RX 250 WO HCPCS: Performed by: NURSE PRACTITIONER

## 2023-02-14 PROCEDURE — 83735 ASSAY OF MAGNESIUM: CPT

## 2023-02-14 PROCEDURE — 87147 CULTURE TYPE IMMUNOLOGIC: CPT

## 2023-02-14 PROCEDURE — 2580000003 HC RX 258: Performed by: NURSE PRACTITIONER

## 2023-02-14 PROCEDURE — 2720000010 HC SURG SUPPLY STERILE

## 2023-02-14 PROCEDURE — 95813 EEG EXTND MNTR 61-119 MIN: CPT

## 2023-02-14 PROCEDURE — A4216 STERILE WATER/SALINE, 10 ML: HCPCS | Performed by: NURSE PRACTITIONER

## 2023-02-14 PROCEDURE — 99233 SBSQ HOSP IP/OBS HIGH 50: CPT | Performed by: SURGERY

## 2023-02-14 PROCEDURE — 87186 SC STD MICRODIL/AGAR DIL: CPT

## 2023-02-14 PROCEDURE — 82330 ASSAY OF CALCIUM: CPT

## 2023-02-14 PROCEDURE — 85025 COMPLETE CBC W/AUTO DIFF WBC: CPT

## 2023-02-14 PROCEDURE — 2000000000 HC ICU R&B

## 2023-02-14 PROCEDURE — 2580000003 HC RX 258: Performed by: INTERNAL MEDICINE

## 2023-02-14 PROCEDURE — 36592 COLLECT BLOOD FROM PICC: CPT

## 2023-02-14 PROCEDURE — 90945 DIALYSIS ONE EVALUATION: CPT

## 2023-02-14 PROCEDURE — 87076 CULTURE ANAEROBE IDENT EACH: CPT

## 2023-02-14 PROCEDURE — 94003 VENT MGMT INPAT SUBQ DAY: CPT

## 2023-02-14 PROCEDURE — 84100 ASSAY OF PHOSPHORUS: CPT

## 2023-02-14 PROCEDURE — 82962 GLUCOSE BLOOD TEST: CPT

## 2023-02-14 PROCEDURE — 87075 CULTR BACTERIA EXCEPT BLOOD: CPT

## 2023-02-14 RX ORDER — CLINDAMYCIN PHOSPHATE 600 MG/50ML
600 INJECTION INTRAVENOUS EVERY 8 HOURS
Status: DISCONTINUED | OUTPATIENT
Start: 2023-02-14 | End: 2023-02-16

## 2023-02-14 RX ADMIN — HEPARIN SODIUM 5000 UNITS: 5000 INJECTION INTRAVENOUS; SUBCUTANEOUS at 05:43

## 2023-02-14 RX ADMIN — IPRATROPIUM BROMIDE AND ALBUTEROL SULFATE 1 AMPULE: 2.5; .5 SOLUTION RESPIRATORY (INHALATION) at 22:02

## 2023-02-14 RX ADMIN — Medication: at 20:16

## 2023-02-14 RX ADMIN — Medication: at 13:46

## 2023-02-14 RX ADMIN — CALCIUM CHLORIDE 8000 MG: 100 INJECTION, SOLUTION INTRAVENOUS at 16:23

## 2023-02-14 RX ADMIN — PROPOFOL 40 MCG/KG/MIN: 10 INJECTION, EMULSION INTRAVENOUS at 05:42

## 2023-02-14 RX ADMIN — PROPOFOL 20 MCG/KG/MIN: 10 INJECTION, EMULSION INTRAVENOUS at 17:37

## 2023-02-14 RX ADMIN — PROPOFOL 30 MCG/KG/MIN: 10 INJECTION, EMULSION INTRAVENOUS at 23:20

## 2023-02-14 RX ADMIN — SODIUM CHLORIDE, PRESERVATIVE FREE 10 ML: 5 INJECTION INTRAVENOUS at 09:07

## 2023-02-14 RX ADMIN — SODIUM HYPOCHLORITE: 1.25 SOLUTION TOPICAL at 10:21

## 2023-02-14 RX ADMIN — CHLORHEXIDINE GLUCONATE 0.12% ORAL RINSE 15 ML: 1.2 LIQUID ORAL at 08:18

## 2023-02-14 RX ADMIN — COLLAGENASE SANTYL: 250 OINTMENT TOPICAL at 10:22

## 2023-02-14 RX ADMIN — HEPARIN SODIUM 5000 UNITS: 5000 INJECTION INTRAVENOUS; SUBCUTANEOUS at 14:39

## 2023-02-14 RX ADMIN — IPRATROPIUM BROMIDE AND ALBUTEROL SULFATE 1 AMPULE: 2.5; .5 SOLUTION RESPIRATORY (INHALATION) at 15:15

## 2023-02-14 RX ADMIN — PROPOFOL 40 MCG/KG/MIN: 10 INJECTION, EMULSION INTRAVENOUS at 01:37

## 2023-02-14 RX ADMIN — Medication: at 15:12

## 2023-02-14 RX ADMIN — Medication: at 08:31

## 2023-02-14 RX ADMIN — CLINDAMYCIN PHOSPHATE 600 MG: 600 INJECTION, SOLUTION INTRAVENOUS at 13:03

## 2023-02-14 RX ADMIN — CHLORHEXIDINE GLUCONATE 0.12% ORAL RINSE 15 ML: 1.2 LIQUID ORAL at 21:24

## 2023-02-14 RX ADMIN — MAGNESIUM CHLORIDE, DEXTROSE MONOHYDRATE, LACTIC ACID, SODIUM CHLORIDE, SODIUM BICARBONATE AND POTASSIUM CHLORIDE 1500 ML/HR: 2.03; 22; 5.4; 6.46; 3.09; .157 INJECTION INTRAVENOUS at 22:52

## 2023-02-14 RX ADMIN — HEPARIN SODIUM 5000 UNITS: 5000 INJECTION INTRAVENOUS; SUBCUTANEOUS at 21:30

## 2023-02-14 RX ADMIN — Medication: at 05:08

## 2023-02-14 RX ADMIN — Medication: at 09:28

## 2023-02-14 RX ADMIN — Medication: at 11:58

## 2023-02-14 RX ADMIN — SODIUM CHLORIDE, PRESERVATIVE FREE 20 MG: 5 INJECTION INTRAVENOUS at 09:06

## 2023-02-14 RX ADMIN — ASPIRIN 81 MG CHEWABLE TABLET 81 MG: 81 TABLET CHEWABLE at 09:06

## 2023-02-14 RX ADMIN — PROPOFOL 20 MCG/KG/MIN: 10 INJECTION, EMULSION INTRAVENOUS at 08:54

## 2023-02-14 RX ADMIN — CLINDAMYCIN PHOSPHATE 600 MG: 600 INJECTION, SOLUTION INTRAVENOUS at 19:42

## 2023-02-14 RX ADMIN — Medication: at 04:16

## 2023-02-14 RX ADMIN — Medication: at 19:24

## 2023-02-14 RX ADMIN — Medication: at 01:37

## 2023-02-14 RX ADMIN — Medication: at 16:00

## 2023-02-14 RX ADMIN — IPRATROPIUM BROMIDE AND ALBUTEROL SULFATE 1 AMPULE: 2.5; .5 SOLUTION RESPIRATORY (INHALATION) at 07:23

## 2023-02-14 RX ADMIN — CALCIUM CHLORIDE 8000 MG: 100 INJECTION, SOLUTION INTRAVENOUS at 03:57

## 2023-02-14 RX ADMIN — IPRATROPIUM BROMIDE AND ALBUTEROL SULFATE 1 AMPULE: 2.5; .5 SOLUTION RESPIRATORY (INHALATION) at 11:25

## 2023-02-14 ASSESSMENT — PAIN SCALES - GENERAL
PAINLEVEL_OUTOF10: 0

## 2023-02-14 ASSESSMENT — PULMONARY FUNCTION TESTS
PIF_VALUE: 17
PIF_VALUE: 19
PIF_VALUE: 23
PIF_VALUE: 20
PIF_VALUE: 20

## 2023-02-14 NOTE — PROGRESS NOTES
Nephrology Progress Note  2/14/2023 2:21 PM        Subjective:   Admit Date: 2/11/2023  PCP: Zina Veronica History:  patient seen in early morning, this is a late entry   he remains on mechanical ventilation and continuous dialysis    Diet:  no feeding this morning when I saw him    ROS:   he has continuous EEG monitoring, on assist control mode, with FiO2 40% PEEP of 5   small dose of norepinephrine, sedated with propofol   remains on continuous dialysis    Data:     Current meds:    clindamycin (CLEOCIN) IV  600 mg IntraVENous Q8H    heparin (porcine)  5,000 Units SubCUTAneous 3 times per day    sodium hypochlorite   Irrigation Daily    collagenase   Topical Daily    sodium chloride flush  5-40 mL IntraVENous 2 times per day    aspirin  81 mg Oral Daily    [Held by provider] clopidogrel  75 mg Oral Daily    ipratropium-albuterol  1 ampule Inhalation Q4H WA    chlorhexidine  15 mL Mouth/Throat BID    famotidine (PEPCID) injection  20 mg IntraVENous Daily      dextrose 30 mL/hr at 02/13/23 1954    prismaSATE BGK 2/0 1,500 mL/hr at 02/14/23 1158    prismaSATE BGK 4/2.5 200 mL/hr at 02/14/23 1346    prismaSATE BGK 2/0 1,000 mL/hr at 02/14/23 0578    calcium chloride CRRT infusion 50 mL/hr at 02/14/23 0751    heparin 5000 units CRRT syringe      norepinephrine Stopped (02/14/23 0814)    dextrose      sodium chloride      propofol 20 mcg/kg/min (02/14/23 1208)         I/O last 3 completed shifts:   In: 1223 [I.V.:1223]  Out: 5870 [Urine:453; Emesis/NG output:386]    CBC:   Recent Labs     02/13/23  0845 02/14/23  0700   WBC 4.7 4.2   HGB 7.9* 7.7*   * 104*          Recent Labs     02/13/23  0707 02/13/23  1854 02/14/23  0700    138 135   K 4.4 4.2 3.7    106 104   CO2 24 25 26   BUN 38* 28* 23   CREATININE 3.3* 2.6* 2.2*   GLUCOSE 66* 79 90       Lab Results   Component Value Date    CALCIUM 8.4 02/14/2023    PHOS 3.0 02/14/2023       Objective:     Vitals: BP (!) 114/53   Pulse 60   Temp (!) 95.2 °F (35.1 °C) (Rectal)   Resp 16   Ht 6' (1.829 m)   Wt 299 lb 9.7 oz (135.9 kg)   SpO2 100%   BMI 40.63 kg/m² ,    General appearance:   intubated, sedated  HEENT:   positive conjunctival pallor  Neck:   right IJ temporary dialysis catheter  Lungs:   positive adventitious breath sound  Heart:   seemed regular rate and rhythm, left chest wall implanted cardiac device  Abdomen:  soft  Extremities:   bilateral leg wound and edema   has Fuller catheter      Problem List :         Impression :      stage III acute kidney injury with underlying CKD stage IV A3- on continuous kidney supportive therapy no sign of recovery yet   septic shock with suspected multiorgan dysfunction   likely has acute decompensated heart failure with underlying cardiomyopathy and atherosclerotic cardiovascular disease- he has significant right pleural effusion, as well as abdominal pelvic ascites   possible additional lung infection   several chronic condition, diabetes, aftermath of acute illness    Recommendation/Plan  :      as he is requiring less pressor I will be aggressive with ultrafiltration- plan would be to get him close to normovolemia- especially with pleural effusion, ascites- hopefully that would expedite the extubation- of course remains in high risk for pneumonitis and other lung injury- will watch for iatrogenic and nosocomial complication, good glycemic control and follow clinically and biochemically- I will try to call his daughter sometime today      Sonal Jenkins MD MD

## 2023-02-14 NOTE — PROGRESS NOTES
Bhumi Topete placed on pt:   Date: 02/13/23  Time: 2330    Level of consciousness: responds to pain    Ventilated: Yes     Sedated: Yes    Initial Seizure [place] (%): 0    Ordering Provider:  Dr. Larissa Woodruff    Pertinent Events : posturing        Study Discontinued:   Time: 02/14/23 2330        On-call Neurologist notified of the completed exam.        Electronically signed by Tarik Layne RN on 2/14/2023 at 6:43 AM

## 2023-02-14 NOTE — PROGRESS NOTES
I have personally seen and examined the patient independently. I have reviewed the patient's available data,including medical history and recent test results. Reviewed and discussed note as documented by WAGNER. I agree with the physical exam findings, assessment and plan. My documented complex medical decisions constitute a substantive portion of the supervisory note.   50 minutes    Acute respiratory failure with hypoxia, hypercapnia  Acute kidney injury (superimposed on CKD stage III), currently requires dialytic support  Hypervolemia/pulmonary edema due to above  Coronary artery disease  History of chronic diastolic heart failure (suspect acute on chronic)  Diabetes mellitus  Severe peripheral vascular disease    Pending finalized results of cultures, antigens discontinuation of empiric antibiotic (provided for questionable pneumonia which seems unlikely based on current evaluation including bedside US (bilateral pleural effusions) and CT imaging of the chest)  Dialytic support ultrafiltration per nephrology service  Attempt sedation holiday, SBT to tolerance    E Cordasco  [phone]

## 2023-02-14 NOTE — FLOWSHEET NOTE
SAT: after 15-20 minutes off propofol, patient became very restless, bucking ventilator, continuous movements. Not following commands or opening eyes when prompted; did shake head \"no\" more than once when asked to open eyes & became increasingly agitated. Propofol restarted at half previous rate per order.

## 2023-02-14 NOTE — PROGRESS NOTES
CARDIOLOGY  NOTE        Name:  Shikha Noriega /Age/Sex: 1950  (67 y.o. male)   MRN & CSN:  [id number] & [id number] Admission Date/Time: 2023 10:42 AM   Location:  [facility] PCP: Meenu Moreland Day: 4        PLAN FROM CARDIOLOGY FOR TODAY:   Continue supportive care, echocardiogram showed normal LV systolic function however the patient is noted to have severe pulmonary hypertension      - cardiology consult is for: Elevated troponin    -  Interval history: On vent     ASSESSMENT/ PLAN:      - cad patient history of known CAD and stents in all 3 arteries and had cardiac cath done in 2018 which showed the stents are patent  Last Cardiolite done in 2020 showed the EF was 48% showed inferior wall MI no ischemia was seen  Patient's troponin is elevated there probably secondary to type II elevation given that the patient creatinine is for now   - acute kidney injury with the newly elevated creatinine to high level per renal CRRT  -hfpef aggressive diuresis and monitor    -icd last ICD check was on 2023 which showed that the OptiVol was normal   -pvd had multiple interventions done last 1 was done in October of last year   Respiratory failure remains on vent  Severe pulmonary hypertension          Subjective: Todays complain: Patient on vent    HPI:  Wayne Rodriguez is a 67 y. o.year old who and presents with had concerns including Respiratory Distress. Chief Complaint   Patient presents with    Respiratory Distress           Objective: Temperature:  Current - Temp: (!) 95.2 °F (35.1 °C);  Max - Temp  Av.7 °F (35.9 °C)  Min: 95.2 °F (35.1 °C)  Max: 98.1 °F (36.7 °C)    Respiratory Rate : Resp  Avg: 15.9  Min: 11  Max: 21    Pulse Range: Pulse  Av.3  Min: 60  Max: 63    Blood Presuure Range:  Systolic (85RXM), TPL:465 , Min:79 , XLB:561   ; Diastolic (28BBT), UIQ:35, Min:50, Max:70      Pulse ox Range: SpO2 Av.2 %  Min: 92 %  Max: 100 %    24hr I & O:    Intake/Output Summary (Last 24 hours) at 2023 0629  Last data filed at 2023 7571  Gross per 24 hour   Intake 1222.99 ml   Output 4142 ml   Net -2919.01 ml         BP (!) 138/58   Pulse 60   Temp (!) 95.2 °F (35.1 °C) (Rectal)   Resp 16   Ht 6' (1.829 m)   Wt 299 lb 9.7 oz (135.9 kg)   SpO2 100%   BMI 40.63 kg/m²           Review of Systems: On vent    TELEMETRY: Atrial paced rhythm   has a past medical history of Acid reflux, Acute MI ([address] Arthritis, Broken teeth, CAD (coronary artery disease), Cardiomyopathy ([address] Cerebral artery occlusion with cerebral infarction ([address] CHF (congestive heart failure) ([address] Chronic back pain, Chronic kidney disease, Diabetes mellitus ([address] Diabetic neuropathy ([address] H/O cardiovascular stress test, H/O Doppler ultrasound, H/O percutaneous left heart catheterization, History of irregular heartbeat, History of syncope, Hyperlipidemia, Hypertension, Leg swelling, Necrotic toes (HCC), Neuropathy, neuropathy, PAD (peripheral artery disease) ([address] PVD (peripheral vascular disease) ([address] Sick sinus syndrome ([address] Sleep apnea, Spinal stenosis, Teeth missing, Type 2 diabetes mellitus without complication ([address] and WD-Chronic foot ulcer, left, with necrosis of bone ([address] has a past surgical history that includes Coronary angioplasty with stent; Dental surgery; Colonoscopy (2016); pacemaker placement (2010); vascular surgery; colectomy (Right, 2016); Toe amputation (Right, 2017); Toe amputation (Right, 2018); Cardiac catheterization; Cardiac defibrillator placement (2010); Coronary angioplasty; Cardiac catheterization (2017); Cardiac catheterization (2018); Toe amputation (Left, 2020); IR TUNNELED CVC PLACE WO SQ PORT/PUMP > 5 YEARS (2021); Toe amputation (Left, 2022);  Toe amputation (Right, 10/20/2022); and IR TUNNELED CVC PLACE WO SQ PORT/PUMP > 5 YEARS (11/17/2022). Physical Exam:  General: On vent  Head:normal  Eye: Pupils equal and round  Neck:  No JVD, no carotid bruit noted   Chest:  Clear to auscultation, no signs of respiratory distress  Cardiovascular:  Normal rate and rhythm. S1 and S2 noted. No murmurs rubs or gallops  Abdomen:   nontender  Extremities:  tr edema  Pulses; palpable  Neuro:  On vent2    Medications:    heparin (porcine)  5,000 Units SubCUTAneous 3 times per day    sodium hypochlorite   Irrigation Daily    collagenase   Topical Daily    sodium chloride flush  5-40 mL IntraVENous 2 times per day    aspirin  81 mg Oral Daily    [Held by provider] clopidogrel  75 mg Oral Daily    ipratropium-albuterol  1 ampule Inhalation Q4H WA    chlorhexidine  15 mL Mouth/Throat BID    famotidine (PEPCID) injection  20 mg IntraVENous Daily      dextrose 30 mL/hr at 02/13/23 1954    prismaSATE BGK 2/0 1,500 mL/hr at 02/14/23 0508    prismaSATE BGK 4/2.5 200 mL/hr at 02/13/23 1018    prismaSATE BGK 2/0 1,000 mL/hr at 02/14/23 0416    calcium chloride CRRT infusion 50 mL/hr at 02/14/23 0437    heparin 5000 units CRRT syringe      norepinephrine 2 mcg/min (02/13/23 1419)    dextrose      sodium chloride      propofol 40 mcg/kg/min (02/14/23 0542)     fentanNYL, midazolam, potassium chloride, magnesium sulfate, calcium gluconate **OR** calcium gluconate **OR** calcium gluconate **OR** calcium gluconate, sodium phosphate IVPB **OR** sodium phosphate IVPB **OR** sodium phosphate IVPB **OR** sodium phosphate IVPB, heparin 5000 units CRRT syringe, glucose, dextrose bolus **OR** dextrose bolus, glucagon (rDNA), dextrose, sodium chloride flush, sodium chloride    Lab Data:  CBC:   Recent Labs     02/11/23  1047 02/13/23  0845   WBC 4.6 4.7   HGB 10.3* 7.9*   HCT 37.0* 26.4*   MCV 92.3 85.7    119*     BMP:   Recent Labs     02/12/23  1907 02/13/23  0707 02/13/23  1854    137 138   K 5.1 4.4 4.2    105 106   CO2 23 24 25   PHOS 4.0 3.4 3.1 BUN 49* 38* 28*   CREATININE 3.9* 3.3* 2.6*     LIVER PROFILE:   Recent Labs     02/12/23  1907 02/13/23  0707 02/13/23  1854   AST 18 18 18   ALT 10 10 9*   BILITOT 0.6 0.7 0.7   ALKPHOS 85 76 71     PT/INR: No results for input(s): PROTIME, INR in the last 72 hours. APTT: No results for input(s): APTT in the last 72 hours. BNP:  No results for input(s): BNP in the last 72 hours. TROPONIN: No results for input(s): TROPONINI in the last 72 hours. Recent Labs     02/11/23  1047   TROPONINT 0.107*        Labs, consult, tests reviewed                    Letitia Montero MD, PA-C 2/14/2023 6:29 AM     Please note this report has been partially produced using speech recognition software and may contain errors related to that system including errors in grammar, punctuation, and spelling, as well as words and phrases that may be inappropriate. If there are any questions or concerns please feel free to contact the dictating provider for clarification.

## 2023-02-14 NOTE — PROGRESS NOTES
I called this patient's daughter Zo Allen to give her an update on this patient's status and to let her know that I am going to take him for some CT's and monitor him with a ceribell tonight.

## 2023-02-14 NOTE — PROGRESS NOTES
02/14/23 0725   Patient Observation   Heart Rate 60   Resp 12   SpO2 100 %   Vent Information   Ventilator ID [id number]   Equipment Changed HME   Vent Mode AC/VC   $Ventilation $Subsequent Day   Ventilator Settings   FiO2  40 %   Vt (Set, mL) 500 mL   Resp Rate (Set) 16 bmp   PEEP/CPAP (cmH2O) 5   Vent Patient Data (Readings)   Vt (Measured) 504 mL   Peak Inspiratory Pressure (cmH2O) 19 cmH2O   Rate Measured 16 br/min   Minute Volume (L/min) 8.1 Liters   Peak Inspiratory Flow (lpm) 44 L/sec   Mean Airway Pressure (cmH2O) 10 cmH20   Plateau Pressure (cm H2O) 17 cm H2O   Driving Pressure 12   I:E Ratio 1:2.0   Flow Sensitivity 3 L/min   Backup Apnea On   Backup Rate 16 Breaths Per Minute   Backup Vt 500   Vent Alarm Settings   High Pressure (cmH2O) 40 cmH2O   Low Minute Volume (lpm) 2.5 L/min   High Minute Volume (lpm) 20 L/min   Low Exhaled Vt (ml) 250 mL   High Exhaled Vt (ml) 1000 mL   RR High (bpm) 40 br/min   Apnea (secs) 20 secs   Additional Respiratoray Assessments   Humidification Source HME   Ambu Bag With Mask At Bedside Yes   Airway Clearance   Suction ET Tube   Subglottic Suction Done No   Suction Device Inline suction catheter   Sputum Method Obtained Endotracheal   Sputum Amount Scant   Sputum Color/Odor Other (comment)   Sputum Consistency Thick   $Obtained Sample $Induced Sputum (charge not used for Bronchoscopy)   ETT    Placement Date/Time: 02/11/23 4634   Placed By: Licensed provider  Placement Verified By: Auscultation; Chest X-ray;Colorimetric ETCO2 device  Preoxygenation: Yes  Mask Ventilation: Mask ventilation not attempted (0)  Technique: Video laryngoscopy  Air. ..    Secured At 25 cm   Measured From Lips   ETT Placement Left   Secured By Commercial tube lyn   Site Assessment Dry

## 2023-02-14 NOTE — PROGRESS NOTES
I called the respiratory therapist Nayla Hernandez about going down to CT. He is taking another patient down with the portable vent at this time, then he will come over to assist with this patient.

## 2023-02-14 NOTE — PROGRESS NOTES
CKST (continuous kidney  supportive therapy )note   Day 3      Pt seen on CKST  .  Toleraing OK    Access : Right IJ temporary dialysis catheter    BP : 120/55    Blood flow: 150 ml/min     Pressor : Small dose of norepinephrine    EF(effluent flow)  : 21 ml/kg/h  UFR(ultrafiltration rate) : 10 ml/kg/h    FF(filtration fraction)  : 15%     AC(anticoagulation) : None    All solution 4/2.5

## 2023-02-14 NOTE — PROGRESS NOTES
V2.0  [facility] Progress Note      Name:  Preston Galdamez /Age/Sex: 1950  (67 y.o. male)   MRN & CSN:  [id number] & [id number] Encounter Date/Time: 2023 4:09 PM EST    Location:  [facility] PCP: Kameron Isaac Day: 4    Assessment and Plan:   Preston Galdamez is a 67 y.o. male who presents with Acute respiratory failure with hypoxia and hypercapnia (HCC)    Acute on chronic renal failure  Acute respiratory failure, requiring intubation  Hyperkalemia-resolved  Acute metabolic encephalopathy  Chronic LE wounds   HTN  CAD  Heart failure with preserved ejection fraction     Plan     Neuro - sedated on ventilator, EEG obtained-no electrographic seizures/NCSE    CV - acute on chronic HF. EF 50-55% in . Resp - acute hypoxic hypercapneic resp failure. Intubated, SAT trials, sedated on propofol    GI - Tolerating tube feed. GI prophylaxis      - NANCY on CKD 3/4. Baseline Cr 2.2. adm Cr 4.8. sCr now 3.3; continue CRRT per nephrology. Urology consulted for difficult parkinson  Distal urethral stricture with incomplete bladder emptying-cystoscopy/TRUS as outpatient if voiding issues persist, voiding trial prior to discharge, per urology     ID -was started on CAP coverage, DC'd today   Started on clindamycin for bilateral lower extremity wounds  Previous wound cultures on -positive for Alcaligenes faecalis, Staph aureus MRSA, Finegoldia magna     Heme -hemoglobin-stable     MSK - LE wounds bilaterally. Wound care consult. Wet-dry for now with xeroform.   Started on clindamycin       Diet Diet NPO  ADULT TUBE FEEDING; Nasogastric; Peptide Based High Protein; Continuous; 20; Yes; 10; Q 4 hours; 65; 30; Q 4 hours; Protein; daily, via NGT   DVT Prophylaxis [] Lovenox, [x]  Heparin, [] SCDs, [] Ambulation,  [] Eliquis, [] Xarelto  [] Coumadin   Code Status Full Code   Disposition From: Home  Expected Disposition: TBD  Estimated Date of Discharge: TBD  Patient requires continued admission due to respiratory failure   Surrogate Decision Maker/ POA Child     Subjective:      Patient seen and examined this morning he remains critically ill on the ventilator. He is currently tolerating CRRT. 2/14: Added clindamycin for lower extremity wounds, SAT trials    Review of Systems:    Review of Systems    Unable to complete secondary to intubation    Objective: Intake/Output Summary (Last 24 hours) at 2/14/2023 1334  Last data filed at 2/14/2023 1303  Gross per 24 hour   Intake 1040.9 ml   Output 4421 ml   Net -3380.1 ml          Vitals:   Vitals:    02/14/23 1300   BP: (!) 112/52   Pulse: 60   Resp: 16   Temp:    SpO2:        Physical Exam:     General: Ill-appearing male, NAD  Eyes: EOMI  ENT: neck supple, ETT  Cardiovascular: Regular rate.   Respiratory: Clear to auscultation, mechanical ventilation  Gastrointestinal: Obese, soft, non tender  Genitourinary: no suprapubic tenderness, Fuller catheter  Musculoskeletal: 2+ lower extremity edema, chronic wounds on bilateral lower extremities- dressing clean/dry/intact  Skin: warm, dry  Neuro: Currently ventilator, sedated, not following commands, cranial nerves grossly intact  psych: Unable to assess    Medications:   Medications:    clindamycin (CLEOCIN) IV  600 mg IntraVENous Q8H    heparin (porcine)  5,000 Units SubCUTAneous 3 times per day    sodium hypochlorite   Irrigation Daily    collagenase   Topical Daily    sodium chloride flush  5-40 mL IntraVENous 2 times per day    aspirin  81 mg Oral Daily    [Held by provider] clopidogrel  75 mg Oral Daily    ipratropium-albuterol  1 ampule Inhalation Q4H WA    chlorhexidine  15 mL Mouth/Throat BID    famotidine (PEPCID) injection  20 mg IntraVENous Daily      Infusions:    dextrose 30 mL/hr at 02/13/23 1954    prismaSATE BGK 2/0 1,500 mL/hr at 02/14/23 1158    prismaSATE BGK 4/2.5 200 mL/hr at 02/13/23 1018    prismaSATE BGK 2/0 1,000 mL/hr at 02/14/23 3444    calcium chloride CRRT infusion 50 mL/hr at 02/14/23 0751    heparin 5000 units CRRT syringe      norepinephrine Stopped (02/14/23 0814)    dextrose      sodium chloride      propofol 20 mcg/kg/min (02/14/23 1208)     PRN Meds: fentanNYL, 50 mcg, Q1H PRN  midazolam, 1 mg, Q2H PRN  potassium chloride, 20 mEq, PRN  magnesium sulfate, 1,000 mg, PRN  calcium gluconate, 1,000 mg, PRN   Or  calcium gluconate, 2,000 mg, PRN   Or  calcium gluconate, 3,000 mg, PRN   Or  calcium gluconate, 4,000 mg, PRN  sodium phosphate IVPB, 6 mmol, PRN   Or  sodium phosphate IVPB, 12 mmol, PRN   Or  sodium phosphate IVPB, 18 mmol, PRN   Or  sodium phosphate IVPB, 24 mmol, PRN  heparin 5000 units CRRT syringe, , Continuous PRN  glucose, 4 tablet, PRN  dextrose bolus, 125 mL, PRN   Or  dextrose bolus, 250 mL, PRN  glucagon (rDNA), 1 mg, PRN  dextrose, , Continuous PRN  sodium chloride flush, 10 mL, PRN  sodium chloride, , PRN      Labs      Recent Results (from the past 24 hour(s))   Calcium, Ionized    Collection Time: 02/13/23  1:40 PM   Result Value Ref Range    Ionized Ca 1.14 1.12 - 1.32 mMOL/L    Calcium, Ionized 4.56 4.48 - 5.28 MG/DL   POCT Glucose    Collection Time: 02/13/23  3:00 PM   Result Value Ref Range    POC Glucose 67 (L) 70 - 99 MG/DL   Comprehensive Metabolic Panel w/ Reflex to MG    Collection Time: 02/13/23  6:54 PM   Result Value Ref Range    Sodium 138 135 - 145 MMOL/L    Potassium 4.2 3.5 - 5.1 MMOL/L    Chloride 106 99 - 110 mMol/L    CO2 25 21 - 32 MMOL/L    BUN 28 (H) 6 - 23 MG/DL    Creatinine 2.6 (H) 0.9 - 1.3 MG/DL    Est, Glom Filt Rate 25 (L) >60 mL/min/1.73m2    Glucose 79 70 - 99 MG/DL    Calcium 7.8 (L) 8.3 - 10.6 MG/DL    Albumin 2.2 (L) 3.4 - 5.0 GM/DL    Total Protein 5.1 (L) 6.4 - 8.2 GM/DL    Total Bilirubin 0.7 0.0 - 1.0 MG/DL    ALT 9 (L) 10 - 40 U/L    AST 18 15 - 37 IU/L    Alkaline Phosphatase 71 40 - 128 IU/L    Anion Gap 7 4 - 16   Calcium, Ionized    Collection Time: 02/13/23  6:54 PM   Result Value Ref Range    Ionized Ca 1.17 1.12 - 1.32 mMOL/L    Calcium, Ionized 4.68 4.48 - 5.28 MG/DL   Magnesium    Collection Time: 02/13/23  6:54 PM   Result Value Ref Range    Magnesium 1.8 1.8 - 2.4 mg/dl   Phosphorus    Collection Time: 02/13/23  6:54 PM   Result Value Ref Range    Phosphorus 3.1 2.5 - 4.9 MG/DL   POCT Glucose    Collection Time: 02/13/23  6:54 PM   Result Value Ref Range    POC Glucose 82 70 - 99 MG/DL   POCT Glucose    Collection Time: 02/14/23 12:10 AM   Result Value Ref Range    POC Glucose 87 70 - 99 MG/DL   Calcium, Ionized    Collection Time: 02/14/23  1:04 AM   Result Value Ref Range    Ionized Ca 1.29 1.12 - 1.32 mMOL/L    Calcium, Ionized 5.16 4.48 - 5.28 MG/DL   POCT Glucose    Collection Time: 02/14/23  4:20 AM   Result Value Ref Range    POC Glucose 97 70 - 99 MG/DL   CBC with Auto Differential    Collection Time: 02/14/23  7:00 AM   Result Value Ref Range    WBC 4.2 4.0 - 10.5 K/CU MM    RBC 3.01 (L) 4.6 - 6.2 M/CU MM    Hemoglobin 7.7 (L) 13.5 - 18.0 GM/DL    Hematocrit 25.8 (L) 42 - 52 %    MCV 85.7 78 - 100 FL    MCH 25.6 (L) 27 - 31 PG    MCHC 29.8 (L) 32.0 - 36.0 %    RDW 18.2 (H) 11.7 - 14.9 %    Platelets 913 (L) 578 - 440 K/CU MM    MPV 9.8 7.5 - 11.1 FL    Differential Type AUTOMATED DIFFERENTIAL     Segs Relative 64.9 36 - 66 %    Lymphocytes % 21.2 (L) 24 - 44 %    Monocytes % 11.3 (H) 0 - 4 %    Eosinophils % 1.9 0 - 3 %    Basophils % 0.5 0 - 1 %    Segs Absolute 2.7 K/CU MM    Lymphocytes Absolute 0.9 K/CU MM    Monocytes Absolute 0.5 K/CU MM    Eosinophils Absolute 0.1 K/CU MM    Basophils Absolute 0.0 K/CU MM    Nucleated RBC % 0.7 %    Total Nucleated RBC 0.0 K/CU MM    Total Immature Neutrophil 0.01 K/CU MM    Immature Neutrophil % 0.2 0 - 0.43 %   Comprehensive Metabolic Panel w/ Reflex to MG    Collection Time: 02/14/23  7:00 AM   Result Value Ref Range    Sodium 135 135 - 145 MMOL/L    Potassium 3.7 3.5 - 5.1 MMOL/L    Chloride 104 99 - 110 mMol/L    CO2 26 21 - 32 MMOL/L    BUN 23 6 - 23 MG/DL Creatinine 2.2 (H) 0.9 - 1.3 MG/DL    Est, Glom Filt Rate 31 (L) >60 mL/min/1.73m2    Glucose 90 70 - 99 MG/DL    Calcium 8.4 8.3 - 10.6 MG/DL    Albumin 2.3 (L) 3.4 - 5.0 GM/DL    Total Protein 5.2 (L) 6.4 - 8.2 GM/DL    Total Bilirubin 0.8 0.0 - 1.0 MG/DL    ALT 8 (L) 10 - 40 U/L    AST 14 (L) 15 - 37 IU/L    Alkaline Phosphatase 71 40 - 128 IU/L    Anion Gap 5 4 - 16   Calcium, Ionized    Collection Time: 02/14/23  7:00 AM   Result Value Ref Range    Ionized Ca 1.26 1.12 - 1.32 mMOL/L    Calcium, Ionized 5.04 4.48 - 5.28 MG/DL   Magnesium    Collection Time: 02/14/23  7:00 AM   Result Value Ref Range    Magnesium 2.0 1.8 - 2.4 mg/dl   Phosphorus    Collection Time: 02/14/23  7:00 AM   Result Value Ref Range    Phosphorus 3.0 2.5 - 4.9 MG/DL   POCT Glucose    Collection Time: 02/14/23 11:23 AM   Result Value Ref Range    POC Glucose 105 (H) 70 - 99 MG/DL        Imaging/Diagnostics Last 24 Hours   XR CHEST PORTABLE    Result Date: 2/11/2023  EXAMINATION: ONE XRAY VIEW OF THE CHEST 2/11/2023 7:29 pm COMPARISON: Chest radiograph 02/11/2023 HISTORY: ORDERING SYSTEM PROVIDED HISTORY: ETT placement TECHNOLOGIST PROVIDED HISTORY: Reason for exam:->ETT placement Reason for Exam: et and og tube placement confirmation Additional signs and symptoms: et and og tube placement confrimation FINDINGS: Lines and tubes: -ETT terminates at the superior margin of the clavicles. -enteric tube takes a the subdiaphragmatic course and terminates out of the field of view -right-sided Cordis catheter, which terminates within the mid/upper SVC Lungs: There is indistinctness of the pulmonary vasculature with patchy opacities within the right greater than left lungs. . Pleura: Small right-sided pleural effusion. Cardiomediastinal silhouette: Enlarged cardiac silhouette. Bones: No acute osseous findings. Soft tissues: Left-sided 2 lead cardiac device. Lines and tubes as above.   The ETT terminates at the level of the superior margin of the clavicles. Grossly unchanged pulmonary exam.  Findings favor cardiogenic pulmonary edema. However a superimposed infectious process cannot be excluded.      Total critical care time 36 minutes excluding all billable procedures    Electronically signed by Romero Rodriguez PA-C on 2/14/2023 at 1:34 PM

## 2023-02-14 NOTE — PROGRESS NOTES
V2.0  [facility] Hospitalist Progress Note      Name:  Khadra Kramer /Age/Sex: 1950  (67 y.o. male)   MRN & CSN:  [id number] & [id number] Encounter Date/Time: 2023 6:51 PM EST    Location:  -A PCP: French Weinberg Day: 4    Assessment and Plan:   Khadra Kramer is a 67 y.o. male with pmh of  CAD, HFpEF, ICD, CKD, DM who presents with Acute respiratory failure with hypoxia and hypercapnia ([facility] [address]      Plan:  Acute hypoxic Hypercapneic respiratory failure: was initially placed on BIPAP but later was intubated on MV was started on Empiric broad spectrum Abx --> continue clinda  Acute renal failure with h/o CKD stage 3: was initially started on HD but could not tolerate well so started on CRRT today  Hyperkalemia: in the setting of above, improving  Elevated troponin: likely type 2 MI in the setting of respiratory as well as kidney failure, does have h/o CAD with multiple stents, Cardio on board. HFpEF:  EF 50-55% in . Repeat echo with simillar EF, hypokinetic RV with bowing of Interventricular septum towards LV. Chronic LE wounds: Wound care on board. Likely infected, Purulent drainage from RLE 1st metatarsal which is foul smelling    Diet Diet NPO  ADULT TUBE FEEDING; Nasogastric; Peptide Based High Protein; Continuous; 20; Yes; 10; Q 4 hours; 65; 30; Q 4 hours; Protein; daily, via NGT   DVT Prophylaxis [] Lovenox, [x]  Heparin, [] SCDs, [] Ambulation,  [] Eliquis, [] Xarelto  [] Coumadin   Code Status Full Code   Disposition From: Home  Expected Disposition: TBD  Estimated Date of Discharge: TBD  Patient requires continued admission due to Respiratory and renal failure   Surrogate Decision Maker/ POA      Subjective:     Chief Complaint: Respiratory Distress     Patient intubated      Review of Systems:    Review of Systems  Could not be obtained, patient intubated    Objective:      Intake/Output Summary (Last 24 hours) at 2023 0844  Last data filed at 2023 5641  Gross per 24 hour   Intake 1030.9 ml   Output 4249 ml   Net -3218.1 ml          Vitals:   Vitals:    02/14/23 0800   BP: (!) 161/63   Pulse: 61   Resp: 16   Temp: (!) 95.2 °F (35.1 °C)   SpO2:        Physical Exam:   Physical Exam General: Ill-appearing male, NAD, intubated  Eyes: EOMI  ENT: neck supple, ETT  Cardiovascular: Regular rate. Respiratory: Clear to auscultation, mechanical ventilation  Gastrointestinal: Obese, soft, non tender  Genitourinary: no suprapubic tenderness, Fuller catheter  Musculoskeletal: 2+ lower extremity edema, chronic wounds on bilateral lower extremities, Large would on medial part of RLE, foul-smelling purulent discharge of 1st metatarsal, multiple toe amputations   Skin: warm, dry  Neuro: Unresponsive on ventilator.       Medications:   Medications:    heparin (porcine)  5,000 Units SubCUTAneous 3 times per day    sodium hypochlorite   Irrigation Daily    collagenase   Topical Daily    sodium chloride flush  5-40 mL IntraVENous 2 times per day    aspirin  81 mg Oral Daily    [Held by provider] clopidogrel  75 mg Oral Daily    ipratropium-albuterol  1 ampule Inhalation Q4H WA    chlorhexidine  15 mL Mouth/Throat BID    famotidine (PEPCID) injection  20 mg IntraVENous Daily      Infusions:    dextrose 30 mL/hr at 02/13/23 1954    prismaSATE BGK 2/0 1,500 mL/hr at 02/14/23 0831    prismaSATE BGK 4/2.5 200 mL/hr at 02/13/23 1018    prismaSATE BGK 2/0 1,000 mL/hr at 02/14/23 0416    calcium chloride CRRT infusion 50 mL/hr at 02/14/23 0751    heparin 5000 units CRRT syringe      norepinephrine Stopped (02/14/23 0814)    dextrose      sodium chloride      propofol 30 mcg/kg/min (02/14/23 0812)     PRN Meds: fentanNYL, 50 mcg, Q1H PRN  midazolam, 1 mg, Q2H PRN  potassium chloride, 20 mEq, PRN  magnesium sulfate, 1,000 mg, PRN  calcium gluconate, 1,000 mg, PRN   Or  calcium gluconate, 2,000 mg, PRN   Or  calcium gluconate, 3,000 mg, PRN   Or  calcium gluconate, 4,000 mg, PRN  sodium phosphate IVPB, 6 mmol, PRN   Or  sodium phosphate IVPB, 12 mmol, PRN   Or  sodium phosphate IVPB, 18 mmol, PRN   Or  sodium phosphate IVPB, 24 mmol, PRN  heparin 5000 units CRRT syringe, , Continuous PRN  glucose, 4 tablet, PRN  dextrose bolus, 125 mL, PRN   Or  dextrose bolus, 250 mL, PRN  glucagon (rDNA), 1 mg, PRN  dextrose, , Continuous PRN  sodium chloride flush, 10 mL, PRN  sodium chloride, , PRN      Labs      Recent Results (from the past 24 hour(s))   CBC with Auto Differential    Collection Time: 02/13/23  8:45 AM   Result Value Ref Range    WBC 4.7 4.0 - 10.5 K/CU MM    RBC 3.08 (L) 4.6 - 6.2 M/CU MM    Hemoglobin 7.9 (L) 13.5 - 18.0 GM/DL    Hematocrit 26.4 (L) 42 - 52 %    MCV 85.7 78 - 100 FL    MCH 25.6 (L) 27 - 31 PG    MCHC 29.9 (L) 32.0 - 36.0 %    RDW 18.2 (H) 11.7 - 14.9 %    Platelets 596 (L) 055 - 440 K/CU MM    MPV 9.9 7.5 - 11.1 FL    Differential Type AUTOMATED DIFFERENTIAL     Segs Relative 62.5 36 - 66 %    Lymphocytes % 22.7 (L) 24 - 44 %    Monocytes % 12.0 (H) 0 - 4 %    Eosinophils % 1.5 0 - 3 %    Basophils % 1.1 (H) 0 - 1 %    Segs Absolute 2.9 K/CU MM    Lymphocytes Absolute 1.1 K/CU MM    Monocytes Absolute 0.6 K/CU MM    Eosinophils Absolute 0.1 K/CU MM    Basophils Absolute 0.1 K/CU MM    Nucleated RBC % 1.1 %    Total Nucleated RBC 0.1 K/CU MM    Total Immature Neutrophil 0.01 K/CU MM    Immature Neutrophil % 0.2 0 - 0.43 %   Legionella antigen, urine    Collection Time: 02/13/23 11:50 AM    Specimen: Urine   Result Value Ref Range    Legionella Urinary Ag NEGATIVE NEGATIVE   Strep Pneumoniae Antigen    Collection Time: 02/13/23 11:50 AM    Specimen: CSF   Result Value Ref Range    Strep pneumo Ag URINE NEGATIVE    Urinalysis with Reflex to Culture    Collection Time: 02/13/23 11:50 AM    Specimen: Urine   Result Value Ref Range    Color, UA YELLOW YELLOW    Clarity, UA CLEAR CLEAR    Glucose, Urine NEGATIVE NEGATIVE MG/DL    Bilirubin Urine MODERATE NUMBER OR AMOUNT OBSERVED (A) NEGATIVE MG/DL    Ketones, Urine TRACE (A) NEGATIVE MG/DL    Specific Gravity, UA 1.025 1.001 - 1.035    Blood, Urine NEGATIVE NEGATIVE    pH, Urine 5.0 5.0 - 8.0    Protein, UA 30 (A) NEGATIVE MG/DL    Urobilinogen, Urine 1.0 0.2 - 1.0 MG/DL    Nitrite Urine, Quantitative NEGATIVE NEGATIVE    Leukocyte Esterase, Urine TRACE (A) NEGATIVE    RBC, UA 4 (H) 0 - 3 /HPF    WBC, UA <1 0 - 2 /HPF    Bacteria, UA NEGATIVE NEGATIVE /HPF    Squam Epithel, UA 7 /HPF    Mucus, UA RARE (A) NEGATIVE HPF    Sperm, UA RARE /HFP    Trichomonas, UA NONE SEEN NONE SEEN /HPF    Hyaline Casts, UA >20 /LPF   Calcium, Ionized    Collection Time: 02/13/23  1:40 PM   Result Value Ref Range    Ionized Ca 1.14 1.12 - 1.32 mMOL/L    Calcium, Ionized 4.56 4.48 - 5.28 MG/DL   POCT Glucose    Collection Time: 02/13/23  3:00 PM   Result Value Ref Range    POC Glucose 67 (L) 70 - 99 MG/DL   Comprehensive Metabolic Panel w/ Reflex to MG    Collection Time: 02/13/23  6:54 PM   Result Value Ref Range    Sodium 138 135 - 145 MMOL/L    Potassium 4.2 3.5 - 5.1 MMOL/L    Chloride 106 99 - 110 mMol/L    CO2 25 21 - 32 MMOL/L    BUN 28 (H) 6 - 23 MG/DL    Creatinine 2.6 (H) 0.9 - 1.3 MG/DL    Est, Glom Filt Rate 25 (L) >60 mL/min/1.73m2    Glucose 79 70 - 99 MG/DL    Calcium 7.8 (L) 8.3 - 10.6 MG/DL    Albumin 2.2 (L) 3.4 - 5.0 GM/DL    Total Protein 5.1 (L) 6.4 - 8.2 GM/DL    Total Bilirubin 0.7 0.0 - 1.0 MG/DL    ALT 9 (L) 10 - 40 U/L    AST 18 15 - 37 IU/L    Alkaline Phosphatase 71 40 - 128 IU/L    Anion Gap 7 4 - 16   Calcium, Ionized    Collection Time: 02/13/23  6:54 PM   Result Value Ref Range    Ionized Ca 1.17 1.12 - 1.32 mMOL/L    Calcium, Ionized 4.68 4.48 - 5.28 MG/DL   Magnesium    Collection Time: 02/13/23  6:54 PM   Result Value Ref Range    Magnesium 1.8 1.8 - 2.4 mg/dl   Phosphorus    Collection Time: 02/13/23  6:54 PM   Result Value Ref Range    Phosphorus 3.1 2.5 - 4.9 MG/DL   POCT Glucose    Collection Time: 02/13/23  6:54 PM Result Value Ref Range    POC Glucose 82 70 - 99 MG/DL   POCT Glucose    Collection Time: 02/14/23 12:10 AM   Result Value Ref Range    POC Glucose 87 70 - 99 MG/DL   Calcium, Ionized    Collection Time: 02/14/23  1:04 AM   Result Value Ref Range    Ionized Ca 1.29 1.12 - 1.32 mMOL/L    Calcium, Ionized 5.16 4.48 - 5.28 MG/DL   POCT Glucose    Collection Time: 02/14/23  4:20 AM   Result Value Ref Range    POC Glucose 97 70 - 99 MG/DL   CBC with Auto Differential    Collection Time: 02/14/23  7:00 AM   Result Value Ref Range    WBC 4.2 4.0 - 10.5 K/CU MM    RBC 3.01 (L) 4.6 - 6.2 M/CU MM    Hemoglobin 7.7 (L) 13.5 - 18.0 GM/DL    Hematocrit 25.8 (L) 42 - 52 %    MCV 85.7 78 - 100 FL    MCH 25.6 (L) 27 - 31 PG    MCHC 29.8 (L) 32.0 - 36.0 %    RDW 18.2 (H) 11.7 - 14.9 %    Platelets 784 (L) 736 - 440 K/CU MM    MPV 9.8 7.5 - 11.1 FL    Differential Type AUTOMATED DIFFERENTIAL     Segs Relative 64.9 36 - 66 %    Lymphocytes % 21.2 (L) 24 - 44 %    Monocytes % 11.3 (H) 0 - 4 %    Eosinophils % 1.9 0 - 3 %    Basophils % 0.5 0 - 1 %    Segs Absolute 2.7 K/CU MM    Lymphocytes Absolute 0.9 K/CU MM    Monocytes Absolute 0.5 K/CU MM    Eosinophils Absolute 0.1 K/CU MM    Basophils Absolute 0.0 K/CU MM    Nucleated RBC % 0.7 %    Total Nucleated RBC 0.0 K/CU MM    Total Immature Neutrophil 0.01 K/CU MM    Immature Neutrophil % 0.2 0 - 0.43 %   Comprehensive Metabolic Panel w/ Reflex to MG    Collection Time: 02/14/23  7:00 AM   Result Value Ref Range    Sodium 135 135 - 145 MMOL/L    Potassium 3.7 3.5 - 5.1 MMOL/L    Chloride 104 99 - 110 mMol/L    CO2 26 21 - 32 MMOL/L    BUN 23 6 - 23 MG/DL    Creatinine 2.2 (H) 0.9 - 1.3 MG/DL    Est, Glom Filt Rate 31 (L) >60 mL/min/1.73m2    Glucose 90 70 - 99 MG/DL    Calcium 8.4 8.3 - 10.6 MG/DL    Albumin 2.3 (L) 3.4 - 5.0 GM/DL    Total Protein 5.2 (L) 6.4 - 8.2 GM/DL    Total Bilirubin 0.8 0.0 - 1.0 MG/DL    ALT 8 (L) 10 - 40 U/L    AST 14 (L) 15 - 37 IU/L    Alkaline Phosphatase 71 40 - 128 IU/L    Anion Gap 5 4 - 16   Calcium, Ionized    Collection Time: 02/14/23  7:00 AM   Result Value Ref Range    Ionized Ca 1.26 1.12 - 1.32 mMOL/L    Calcium, Ionized 5.04 4.48 - 5.28 MG/DL   Magnesium    Collection Time: 02/14/23  7:00 AM   Result Value Ref Range    Magnesium 2.0 1.8 - 2.4 mg/dl   Phosphorus    Collection Time: 02/14/23  7:00 AM   Result Value Ref Range    Phosphorus 3.0 2.5 - 4.9 MG/DL        Imaging/Diagnostics Last 24 Hours   XR CHEST PORTABLE    Result Date: 2/11/2023  EXAMINATION: ONE XRAY VIEW OF THE CHEST 2/11/2023 7:29 pm COMPARISON: Chest radiograph 02/11/2023 HISTORY: ORDERING SYSTEM PROVIDED HISTORY: ETT placement TECHNOLOGIST PROVIDED HISTORY: Reason for exam:->ETT placement Reason for Exam: et and og tube placement confirmation Additional signs and symptoms: et and og tube placement confrimation FINDINGS: Lines and tubes: -ETT terminates at the superior margin of the clavicles. -enteric tube takes a the subdiaphragmatic course and terminates out of the field of view -right-sided Cordis catheter, which terminates within the mid/upper SVC Lungs: There is indistinctness of the pulmonary vasculature with patchy opacities within the right greater than left lungs. . Pleura: Small right-sided pleural effusion. Cardiomediastinal silhouette: Enlarged cardiac silhouette. Bones: No acute osseous findings. Soft tissues: Left-sided 2 lead cardiac device. Lines and tubes as above. The ETT terminates at the level of the superior margin of the clavicles. Grossly unchanged pulmonary exam.  Findings favor cardiogenic pulmonary edema. However a superimposed infectious process cannot be excluded.        Electronically signed by Bimal Enamorado MD on 2/14/2023 at 8:44 AM

## 2023-02-14 NOTE — PROGRESS NOTES
Pt appears to be posturing at this time which is new onset. Dr. Valentin Westfall at bedside to assess pt. Orders for CT Head, Chest/Abd/Pelvis, and Ceribell overnight. Night shift RN aware.

## 2023-02-15 LAB
ALBUMIN SERPL-MCNC: 2.2 GM/DL (ref 3.4–5)
ALBUMIN SERPL-MCNC: 2.3 GM/DL (ref 3.4–5)
ALBUMIN SERPL-MCNC: 2.5 GM/DL (ref 3.4–5)
ALP BLD-CCNC: 68 IU/L (ref 40–128)
ALP BLD-CCNC: 69 IU/L (ref 40–129)
ALP BLD-CCNC: 84 IU/L (ref 40–128)
ALT SERPL-CCNC: 7 U/L (ref 10–40)
ALT SERPL-CCNC: 7 U/L (ref 10–40)
ALT SERPL-CCNC: 8 U/L (ref 10–40)
ANION GAP SERPL CALCULATED.3IONS-SCNC: 4 MMOL/L (ref 4–16)
ANION GAP SERPL CALCULATED.3IONS-SCNC: 5 MMOL/L (ref 4–16)
ANION GAP SERPL CALCULATED.3IONS-SCNC: 6 MMOL/L (ref 4–16)
AST SERPL-CCNC: 11 IU/L (ref 15–37)
AST SERPL-CCNC: 12 IU/L (ref 15–37)
AST SERPL-CCNC: 15 IU/L (ref 15–37)
BASOPHILS ABSOLUTE: 0 K/CU MM
BASOPHILS RELATIVE PERCENT: 0.5 % (ref 0–1)
BILIRUB SERPL-MCNC: 0.7 MG/DL (ref 0–1)
BILIRUB SERPL-MCNC: 0.8 MG/DL (ref 0–1)
BILIRUB SERPL-MCNC: 0.9 MG/DL (ref 0–1)
BUN SERPL-MCNC: 14 MG/DL (ref 6–23)
BUN SERPL-MCNC: 16 MG/DL (ref 6–23)
BUN SERPL-MCNC: 16 MG/DL (ref 6–23)
CALCIUM IONIZED: 4.68 MG/DL (ref 4.48–5.28)
CALCIUM IONIZED: 4.72 MG/DL (ref 4.48–5.28)
CALCIUM SERPL-MCNC: 7.6 MG/DL (ref 8.3–10.6)
CALCIUM SERPL-MCNC: 7.7 MG/DL (ref 8.3–10.6)
CALCIUM SERPL-MCNC: 8.4 MG/DL (ref 8.3–10.6)
CHLORIDE BLD-SCNC: 102 MMOL/L (ref 99–110)
CHLORIDE BLD-SCNC: 104 MMOL/L (ref 99–110)
CHLORIDE BLD-SCNC: 104 MMOL/L (ref 99–110)
CO2: 26 MMOL/L (ref 21–32)
CO2: 26 MMOL/L (ref 21–32)
CO2: 27 MMOL/L (ref 21–32)
CREAT SERPL-MCNC: 1.7 MG/DL (ref 0.9–1.3)
CREAT SERPL-MCNC: 1.8 MG/DL (ref 0.9–1.3)
CREAT SERPL-MCNC: 1.9 MG/DL (ref 0.9–1.3)
DIFFERENTIAL TYPE: ABNORMAL
EOSINOPHILS ABSOLUTE: 0.1 K/CU MM
EOSINOPHILS RELATIVE PERCENT: 2.3 % (ref 0–3)
GFR SERPL CREATININE-BSD FRML MDRD: 37 ML/MIN/1.73M2
GFR SERPL CREATININE-BSD FRML MDRD: 39 ML/MIN/1.73M2
GFR SERPL CREATININE-BSD FRML MDRD: 42 ML/MIN/1.73M2
GLUCOSE BLD-MCNC: 127 MG/DL (ref 70–99)
GLUCOSE BLD-MCNC: 136 MG/DL (ref 70–99)
GLUCOSE BLD-MCNC: 164 MG/DL (ref 70–99)
GLUCOSE BLD-MCNC: 191 MG/DL (ref 70–99)
GLUCOSE BLD-MCNC: 252 MG/DL (ref 70–99)
GLUCOSE SERPL-MCNC: 128 MG/DL (ref 70–99)
GLUCOSE SERPL-MCNC: 131 MG/DL (ref 70–99)
GLUCOSE SERPL-MCNC: 166 MG/DL (ref 70–99)
HCT VFR BLD CALC: 23.5 % (ref 42–52)
HEMOGLOBIN: 7.1 GM/DL (ref 13.5–18)
IMMATURE NEUTROPHIL %: 0.2 % (ref 0–0.43)
IONIZED CA: 1.17 MMOL/L (ref 1.12–1.32)
IONIZED CA: 1.18 MMOL/L (ref 1.12–1.32)
LYMPHOCYTES ABSOLUTE: 0.9 K/CU MM
LYMPHOCYTES RELATIVE PERCENT: 19.3 % (ref 24–44)
MAGNESIUM: 1.8 MG/DL (ref 1.8–2.4)
MAGNESIUM: 1.8 MG/DL (ref 1.8–2.4)
MAGNESIUM: 1.9 MG/DL (ref 1.8–2.4)
MCH RBC QN AUTO: 25.9 PG (ref 27–31)
MCHC RBC AUTO-ENTMCNC: 30.2 % (ref 32–36)
MCV RBC AUTO: 85.8 FL (ref 78–100)
MONOCYTES ABSOLUTE: 0.4 K/CU MM
MONOCYTES RELATIVE PERCENT: 9.5 % (ref 0–4)
NUCLEATED RBC %: 1.1 %
PDW BLD-RTO: 18.3 % (ref 11.7–14.9)
PHOSPHORUS: 2.6 MG/DL (ref 2.5–4.9)
PHOSPHORUS: 2.7 MG/DL (ref 2.5–4.9)
PHOSPHORUS: 2.8 MG/DL (ref 2.5–4.9)
PLATELET # BLD: 92 K/CU MM (ref 140–440)
PMV BLD AUTO: 10.5 FL (ref 7.5–11.1)
POTASSIUM SERPL-SCNC: 3.7 MMOL/L (ref 3.5–5.1)
POTASSIUM SERPL-SCNC: 3.9 MMOL/L (ref 3.5–5.1)
POTASSIUM SERPL-SCNC: 4.1 MMOL/L (ref 3.5–5.1)
RBC # BLD: 2.74 M/CU MM (ref 4.6–6.2)
SEGMENTED NEUTROPHILS ABSOLUTE COUNT: 3 K/CU MM
SEGMENTED NEUTROPHILS RELATIVE PERCENT: 68.2 % (ref 36–66)
SODIUM BLD-SCNC: 134 MMOL/L (ref 135–145)
SODIUM BLD-SCNC: 134 MMOL/L (ref 135–145)
SODIUM BLD-SCNC: 136 MMOL/L (ref 135–145)
TOTAL IMMATURE NEUTOROPHIL: 0.01 K/CU MM
TOTAL NUCLEATED RBC: 0.1 K/CU MM
TOTAL PROTEIN: 5 GM/DL (ref 6.4–8.2)
TOTAL PROTEIN: 5.3 GM/DL (ref 6.4–8.2)
TOTAL PROTEIN: 5.8 GM/DL (ref 6.4–8.2)
WBC # BLD: 4.4 K/CU MM (ref 4–10.5)

## 2023-02-15 PROCEDURE — 84100 ASSAY OF PHOSPHORUS: CPT

## 2023-02-15 PROCEDURE — 2500000003 HC RX 250 WO HCPCS: Performed by: INTERNAL MEDICINE

## 2023-02-15 PROCEDURE — 94003 VENT MGMT INPAT SUBQ DAY: CPT

## 2023-02-15 PROCEDURE — 2580000003 HC RX 258: Performed by: INTERNAL MEDICINE

## 2023-02-15 PROCEDURE — 6360000002 HC RX W HCPCS: Performed by: NURSE PRACTITIONER

## 2023-02-15 PROCEDURE — 94640 AIRWAY INHALATION TREATMENT: CPT

## 2023-02-15 PROCEDURE — 80053 COMPREHEN METABOLIC PANEL: CPT

## 2023-02-15 PROCEDURE — A4216 STERILE WATER/SALINE, 10 ML: HCPCS | Performed by: NURSE PRACTITIONER

## 2023-02-15 PROCEDURE — 2500000003 HC RX 250 WO HCPCS: Performed by: NURSE PRACTITIONER

## 2023-02-15 PROCEDURE — 82962 GLUCOSE BLOOD TEST: CPT

## 2023-02-15 PROCEDURE — 82330 ASSAY OF CALCIUM: CPT

## 2023-02-15 PROCEDURE — 89220 SPUTUM SPECIMEN COLLECTION: CPT

## 2023-02-15 PROCEDURE — 6360000002 HC RX W HCPCS: Performed by: INTERNAL MEDICINE

## 2023-02-15 PROCEDURE — 90945 DIALYSIS ONE EVALUATION: CPT

## 2023-02-15 PROCEDURE — 2580000003 HC RX 258: Performed by: NURSE PRACTITIONER

## 2023-02-15 PROCEDURE — 6370000000 HC RX 637 (ALT 250 FOR IP): Performed by: NURSE PRACTITIONER

## 2023-02-15 PROCEDURE — 2700000000 HC OXYGEN THERAPY PER DAY

## 2023-02-15 PROCEDURE — 94761 N-INVAS EAR/PLS OXIMETRY MLT: CPT

## 2023-02-15 PROCEDURE — 99231 SBSQ HOSP IP/OBS SF/LOW 25: CPT | Performed by: PHYSICIAN ASSISTANT

## 2023-02-15 PROCEDURE — 36592 COLLECT BLOOD FROM PICC: CPT

## 2023-02-15 PROCEDURE — 2720000010 HC SURG SUPPLY STERILE

## 2023-02-15 PROCEDURE — 2000000000 HC ICU R&B

## 2023-02-15 PROCEDURE — 2500000003 HC RX 250 WO HCPCS: Performed by: SPECIALIST

## 2023-02-15 PROCEDURE — 83735 ASSAY OF MAGNESIUM: CPT

## 2023-02-15 PROCEDURE — 85025 COMPLETE CBC W/AUTO DIFF WBC: CPT

## 2023-02-15 RX ORDER — HEPARIN SODIUM (PORCINE) LOCK FLUSH IV SOLN 100 UNIT/ML 100 UNIT/ML
240 SOLUTION INTRAVENOUS PRN
Status: DISCONTINUED | OUTPATIENT
Start: 2023-02-15 | End: 2023-03-07 | Stop reason: HOSPADM

## 2023-02-15 RX ORDER — FAMOTIDINE 20 MG/1
20 TABLET, FILM COATED ORAL DAILY
Status: DISCONTINUED | OUTPATIENT
Start: 2023-02-16 | End: 2023-03-07 | Stop reason: HOSPADM

## 2023-02-15 RX ADMIN — IPRATROPIUM BROMIDE AND ALBUTEROL SULFATE 1 AMPULE: 2.5; .5 SOLUTION RESPIRATORY (INHALATION) at 20:08

## 2023-02-15 RX ADMIN — DEXTROSE MONOHYDRATE: 100 INJECTION, SOLUTION INTRAVENOUS at 03:14

## 2023-02-15 RX ADMIN — SODIUM CHLORIDE, PRESERVATIVE FREE 10 ML: 5 INJECTION INTRAVENOUS at 20:02

## 2023-02-15 RX ADMIN — SODIUM CHLORIDE, PRESERVATIVE FREE 10 ML: 5 INJECTION INTRAVENOUS at 17:31

## 2023-02-15 RX ADMIN — HEPARIN SODIUM 5000 UNITS: 5000 INJECTION INTRAVENOUS; SUBCUTANEOUS at 06:49

## 2023-02-15 RX ADMIN — IPRATROPIUM BROMIDE AND ALBUTEROL SULFATE 1 AMPULE: 2.5; .5 SOLUTION RESPIRATORY (INHALATION) at 15:49

## 2023-02-15 RX ADMIN — CALCIUM CHLORIDE 8000 MG: 100 INJECTION, SOLUTION INTRAVENOUS at 13:56

## 2023-02-15 RX ADMIN — PROPOFOL 30 MCG/KG/MIN: 10 INJECTION, EMULSION INTRAVENOUS at 03:14

## 2023-02-15 RX ADMIN — HEPARIN SODIUM 5000 UNITS: 5000 INJECTION INTRAVENOUS; SUBCUTANEOUS at 21:35

## 2023-02-15 RX ADMIN — CLINDAMYCIN PHOSPHATE 600 MG: 600 INJECTION, SOLUTION INTRAVENOUS at 12:28

## 2023-02-15 RX ADMIN — MAGNESIUM CHLORIDE, DEXTROSE MONOHYDRATE, LACTIC ACID, SODIUM CHLORIDE, SODIUM BICARBONATE AND POTASSIUM CHLORIDE 1500 ML/HR: 2.03; 22; 5.4; 6.46; 3.09; .157 INJECTION INTRAVENOUS at 03:25

## 2023-02-15 RX ADMIN — MAGNESIUM CHLORIDE, DEXTROSE MONOHYDRATE, LACTIC ACID, SODIUM CHLORIDE, SODIUM BICARBONATE AND POTASSIUM CHLORIDE: 2.03; 22; 5.4; 6.46; 3.09; .157 INJECTION INTRAVENOUS at 01:24

## 2023-02-15 RX ADMIN — Medication: at 13:03

## 2023-02-15 RX ADMIN — Medication: at 07:00

## 2023-02-15 RX ADMIN — CLINDAMYCIN PHOSPHATE 600 MG: 600 INJECTION, SOLUTION INTRAVENOUS at 04:20

## 2023-02-15 RX ADMIN — ASPIRIN 81 MG CHEWABLE TABLET 81 MG: 81 TABLET CHEWABLE at 08:11

## 2023-02-15 RX ADMIN — Medication: at 12:09

## 2023-02-15 RX ADMIN — SODIUM CHLORIDE, PRESERVATIVE FREE 20 MG: 5 INJECTION INTRAVENOUS at 08:11

## 2023-02-15 RX ADMIN — IPRATROPIUM BROMIDE AND ALBUTEROL SULFATE 1 AMPULE: 2.5; .5 SOLUTION RESPIRATORY (INHALATION) at 07:21

## 2023-02-15 RX ADMIN — CHLORHEXIDINE GLUCONATE 0.12% ORAL RINSE 15 ML: 1.2 LIQUID ORAL at 19:42

## 2023-02-15 RX ADMIN — Medication: at 09:36

## 2023-02-15 RX ADMIN — SODIUM CHLORIDE, PRESERVATIVE FREE 10 ML: 5 INJECTION INTRAVENOUS at 08:12

## 2023-02-15 RX ADMIN — HEPARIN SODIUM (PORCINE) LOCK FLUSH IV SOLN 100 UNIT/ML 240 UNITS: 100 SOLUTION at 17:31

## 2023-02-15 RX ADMIN — PROPOFOL 20 MCG/KG/MIN: 10 INJECTION, EMULSION INTRAVENOUS at 08:36

## 2023-02-15 RX ADMIN — PROPOFOL 20 MCG/KG/MIN: 10 INJECTION, EMULSION INTRAVENOUS at 14:03

## 2023-02-15 RX ADMIN — CLINDAMYCIN PHOSPHATE 600 MG: 600 INJECTION, SOLUTION INTRAVENOUS at 20:04

## 2023-02-15 RX ADMIN — COLLAGENASE SANTYL: 250 OINTMENT TOPICAL at 08:17

## 2023-02-15 RX ADMIN — CHLORHEXIDINE GLUCONATE 0.12% ORAL RINSE 15 ML: 1.2 LIQUID ORAL at 08:11

## 2023-02-15 RX ADMIN — IPRATROPIUM BROMIDE AND ALBUTEROL SULFATE 1 AMPULE: 2.5; .5 SOLUTION RESPIRATORY (INHALATION) at 11:25

## 2023-02-15 RX ADMIN — SODIUM HYPOCHLORITE: 1.25 SOLUTION TOPICAL at 08:16

## 2023-02-15 RX ADMIN — Medication: at 15:56

## 2023-02-15 RX ADMIN — PROPOFOL 20 MCG/KG/MIN: 10 INJECTION, EMULSION INTRAVENOUS at 21:34

## 2023-02-15 RX ADMIN — Medication: at 16:33

## 2023-02-15 RX ADMIN — HEPARIN SODIUM 5000 UNITS: 5000 INJECTION INTRAVENOUS; SUBCUTANEOUS at 14:01

## 2023-02-15 ASSESSMENT — PULMONARY FUNCTION TESTS
PIF_VALUE: 23
PIF_VALUE: 21
PIF_VALUE: 20
PIF_VALUE: 24
PIF_VALUE: 20
PIF_VALUE: 19
PIF_VALUE: 20
PIF_VALUE: 23
PIF_VALUE: 22
PIF_VALUE: 21
PIF_VALUE: 18
PIF_VALUE: 21

## 2023-02-15 ASSESSMENT — PAIN SCALES - GENERAL
PAINLEVEL_OUTOF10: 0

## 2023-02-15 NOTE — PROGRESS NOTES
Nephrology Progress Note  2/15/2023 9:13 AM        Subjective:   Admit Date: 2/11/2023  PCP: Alfredo Odonnell    Interval History: Patient seen in early morning, this is a late entry    Diet: Tube feed per nasogastric tube    ROS: Remains on mechanical ventilation with assist control mode, FiO2 40% 5 of PEEP  Off vasopressor  Remains oliguric-sedated with moderate dose of propofol    Data:     Current meds:    clindamycin (CLEOCIN) IV  600 mg IntraVENous Q8H    heparin (porcine)  5,000 Units SubCUTAneous 3 times per day    sodium hypochlorite   Irrigation Daily    collagenase   Topical Daily    sodium chloride flush  5-40 mL IntraVENous 2 times per day    aspirin  81 mg Oral Daily    [Held by provider] clopidogrel  75 mg Oral Daily    ipratropium-albuterol  1 ampule Inhalation Q4H WA    chlorhexidine  15 mL Mouth/Throat BID    famotidine (PEPCID) injection  20 mg IntraVENous Daily      prismaSATE BGK 4/2.5      prismaSATE BGK 4/2.5 1,500 mL/hr at 02/15/23 0700    dextrose 30 mL/hr at 02/15/23 0314    prismaSATE BGK 4/2.5 200 mL/hr at 02/14/23 1346    calcium chloride CRRT infusion 50 mL/hr at 02/15/23 0725    heparin 5000 units CRRT syringe      norepinephrine Stopped (02/14/23 0813)    dextrose      sodium chloride      propofol 20 mcg/kg/min (02/15/23 0836)         I/O last 3 completed shifts: In: 2663.4 [I.V.:1866. 1; NG/GT:668; IV Piggyback:129.3]  Out: 7790 [Urine:238; Emesis/NG output:121]    CBC:   Recent Labs     02/13/23  0845 02/14/23  0700 02/15/23  0557   WBC 4.7 4.2 4.4   HGB 7.9* 7.7* 7.1*   * 104* 92*          Recent Labs     02/14/23  1520 02/14/23  2331 02/15/23  0557   * 134* 136   K 3.8 3.9 3.7    104 104   CO2 26 26 27   BUN 20 16 16   CREATININE 2.1* 1.7* 1.9*   GLUCOSE 120* 128* 131*       Lab Results   Component Value Date    CALCIUM 7.6 (L) 02/15/2023    PHOS 2.7 02/15/2023       Objective:     Vitals: BP (!) 97/53   Pulse 60   Temp 96.8 °F (36 °C) (Rectal)   Resp 17   Ht 6' (1.829 m)   Wt 291 lb 7.2 oz (132.2 kg)   SpO2 100%   BMI 39.53 kg/m² ,    General appearance: Intubated, sedated  HEENT: Positive conjunctival pallor  Neck: Right IJ temporary dialysis catheter  Lungs: Limited anterior exam no gross crackles  Heart: Regular rate and rhythm, left chest wall implanted cardiac device  Abdomen: Soft  Extremities: Chronic bilateral thigh and leg edema and chronic wound  He does have a Fuller catheter      Problem List :         Impression :     Stage III acute kidney injury with underlying CKD stage IV A3-not making much urine due to shock syndrome-on continuous dialysis for now  Shock syndrome septic shock were suspected with multiorgan dysfunction-skin and soft tissue bone andinfection are suspected  Underlying atherosclerotic cardiovascular disease as well as cardiomyopathy, with fluid overload including pulmonary edema and ascites, he of course has other chronic diseases which include but not limited to diabetes,  Several sequela of acute illness    Recommendation/Plan  :     As he is hemodynamically stable, off pressor, electrolytes, acid-base balance are acceptable-we will discontinue -continuous dialysis for now-we will watch for kidney recovery and attempt intermittent dialysis as needed-we will watch for iatrogenic and nosocomial complication-tailor antimicrobial agent as more data becomes available-good glycemic control and follow clinically      Sakina Lopez MD MD

## 2023-02-15 NOTE — PROGRESS NOTES
V2.0  [facility] Critical Care Progress Note      Name:  True Theodore /Age/Sex: 1950  (67 y.o. male)   MRN & CSN:  [id number] & [id number] Encounter Date/Time: 2/15/2023 4:09 PM EST    Location:  -A PCP: Kameron Isaac Day: 5    Assessment and Plan:   True Theodore is a 67 y.o. male who presents with Acute respiratory failure with hypoxia and hypercapnia (HCC)    Acute on chronic renal failure  Acute respiratory failure, requiring intubation  Hyperkalemia-resolved  Acute metabolic encephalopathy  Chronic LE wounds   HTN  CAD  Heart failure with preserved ejection fraction     Plan     Neuro - sedated on propofol, will transition to Precedex, EEG obtained-no electrographic seizures/NCSE    CV - acute on chronic HF. EF 50-55% in . Resp - acute hypoxic hypercapneic resp failure. Intubated, SAT trials, sedated on propofol    GI - Tolerating tube feed. GI prophylaxis      - NANCY on CKD 3/4. Baseline Cr 2.2. adm Cr 4.8. sCr now 3.3; continue CRRT per nephrology. Urology consulted for difficult parkinson; Distal urethral stricture with incomplete bladder emptying-cystoscopy/TRUS as outpatient if voiding issues persist, voiding trial prior to discharge     ID -continue clinda for bilateral lower extremity wounds; wound cultures on -positive for Alcaligenes faecalis, Staph aureus MRSA, Finegoldia magna     Heme -hemoglobin-stable     MSK - LE wounds bilaterally. Wound care consult. Wet-dry for now with xeroform.   Continue clinda    Diet Diet NPO  ADULT TUBE FEEDING; Nasogastric; Peptide Based High Protein; Continuous; 20; Yes; 10; Q 4 hours; 65; 30; Q 4 hours; Protein; daily, via NGT   DVT Prophylaxis [] Lovenox, [x]  Heparin, [] SCDs, [] Ambulation,  [] Eliquis, [] Xarelto  [] Coumadin   Code Status Full Code   Disposition From: Home  Expected Disposition: TBD  Estimated Date of Discharge: TBD  Patient requires continued admission due to respiratory failure   Surrogate Decision Maker/ POA Child     Subjective:      Patient seen and examined this morning. Currently remains critically ill on the ventilator on CRRT. I did discuss with nursing the need to do an SAT/SBT. There is no family at the bedside. Review of Systems:    Review of Systems    Unable to complete secondary to intubation    Objective: Intake/Output Summary (Last 24 hours) at 2/15/2023 1443  Last data filed at 2/15/2023 1404  Gross per 24 hour   Intake 2029.03 ml   Output 6048 ml   Net -4018.97 ml          Vitals:   Vitals:    02/15/23 1400   BP: (!) 108/48   Pulse: 61   Resp: 17   Temp: 96.8 °F (36 °C)   SpO2: 100%       Physical Exam:     General: Ill-appearing male, NAD  Eyes: EOMI  ENT: neck supple, ETT  Cardiovascular: Regular rate.   Respiratory: Clear to auscultation, mechanical ventilation  Gastrointestinal: Obese, soft, non tender  Genitourinary: no suprapubic tenderness, Fuller catheter  Musculoskeletal: 2+ lower extremity edema, chronic wounds on bilateral lower extremities- dressing clean/dry/intact  Skin: warm, dry  Neuro: Currently ventilator, sedated, not following commands, cranial nerves grossly intact  psych: Unable to assess    Medications:   Medications:    [START ON 2/16/2023] famotidine  20 mg Per NG tube Daily    clindamycin (CLEOCIN) IV  600 mg IntraVENous Q8H    heparin (porcine)  5,000 Units SubCUTAneous 3 times per day    sodium hypochlorite   Irrigation Daily    collagenase   Topical Daily    sodium chloride flush  5-40 mL IntraVENous 2 times per day    aspirin  81 mg Oral Daily    [Held by provider] clopidogrel  75 mg Oral Daily    ipratropium-albuterol  1 ampule Inhalation Q4H WA    chlorhexidine  15 mL Mouth/Throat BID      Infusions:    prismaSATE BGK 4/2.5 1,000 mL/hr at 02/15/23 0936    prismaSATE BGK 4/2.5 1,500 mL/hr at 02/15/23 1303    dextrose 30 mL/hr at 02/15/23 0314    prismaSATE BGK 4/2.5 200 mL/hr at 02/14/23 1346    calcium chloride CRRT infusion 8,000 mg (02/15/23 1356)    heparin 5000 units CRRT syringe      dextrose      sodium chloride      propofol 20 mcg/kg/min (02/15/23 1403)     PRN Meds: fentanNYL, 50 mcg, Q1H PRN  midazolam, 1 mg, Q2H PRN  potassium chloride, 20 mEq, PRN  magnesium sulfate, 1,000 mg, PRN  calcium gluconate, 1,000 mg, PRN   Or  calcium gluconate, 2,000 mg, PRN   Or  calcium gluconate, 3,000 mg, PRN   Or  calcium gluconate, 4,000 mg, PRN  sodium phosphate IVPB, 6 mmol, PRN   Or  sodium phosphate IVPB, 12 mmol, PRN   Or  sodium phosphate IVPB, 18 mmol, PRN   Or  sodium phosphate IVPB, 24 mmol, PRN  heparin 5000 units CRRT syringe, , Continuous PRN  glucose, 4 tablet, PRN  dextrose bolus, 125 mL, PRN   Or  dextrose bolus, 250 mL, PRN  glucagon (rDNA), 1 mg, PRN  dextrose, , Continuous PRN  sodium chloride flush, 10 mL, PRN  sodium chloride, , PRN      Labs      Recent Results (from the past 24 hour(s))   Comprehensive Metabolic Panel w/ Reflex to MG    Collection Time: 02/14/23  3:20 PM   Result Value Ref Range    Sodium 134 (L) 135 - 145 MMOL/L    Potassium 3.8 3.5 - 5.1 MMOL/L    Chloride 103 99 - 110 mMol/L    CO2 26 21 - 32 MMOL/L    BUN 20 6 - 23 MG/DL    Creatinine 2.1 (H) 0.9 - 1.3 MG/DL    Est, Glom Filt Rate 33 (L) >60 mL/min/1.73m2    Glucose 120 (H) 70 - 99 MG/DL    Calcium 8.4 8.3 - 10.6 MG/DL    Albumin 2.2 (L) 3.4 - 5.0 GM/DL    Total Protein 5.1 (L) 6.4 - 8.2 GM/DL    Total Bilirubin 0.8 0.0 - 1.0 MG/DL    ALT 8 (L) 10 - 40 U/L    AST 14 (L) 15 - 37 IU/L    Alkaline Phosphatase 70 40 - 128 IU/L    Anion Gap 5 4 - 16   Phosphorus    Collection Time: 02/14/23  3:20 PM   Result Value Ref Range    Phosphorus 3.1 2.5 - 4.9 MG/DL   Magnesium    Collection Time: 02/14/23  3:20 PM   Result Value Ref Range    Magnesium 1.8 1.8 - 2.4 mg/dl   Calcium, Ionized    Collection Time: 02/14/23  9:18 PM   Result Value Ref Range    Ionized Ca 1.19 1.12 - 1.32 mMOL/L    Calcium, Ionized 4.76 4.48 - 5.28 MG/DL   POCT Glucose    Collection Time: 02/14/23  9:26 PM   Result Value Ref Range    POC Glucose 117 (H) 70 - 99 MG/DL   Comprehensive Metabolic Panel w/ Reflex to MG    Collection Time: 02/14/23 11:31 PM   Result Value Ref Range    Sodium 134 (L) 135 - 145 MMOL/L    Potassium 3.9 3.5 - 5.1 MMOL/L    Chloride 104 99 - 110 mMol/L    CO2 26 21 - 32 MMOL/L    BUN 16 6 - 23 MG/DL    Creatinine 1.7 (H) 0.9 - 1.3 MG/DL    Est, Glom Filt Rate 42 (L) >60 mL/min/1.73m2    Glucose 128 (H) 70 - 99 MG/DL    Calcium 7.7 (L) 8.3 - 10.6 MG/DL    Albumin 2.3 (L) 3.4 - 5.0 GM/DL    Total Protein 5.3 (L) 6.4 - 8.2 GM/DL    Total Bilirubin 0.8 0.0 - 1.0 MG/DL    ALT 7 (L) 10 - 40 U/L    AST 15 15 - 37 IU/L    Alkaline Phosphatase 69 40 - 129 IU/L    Anion Gap 4 4 - 16   Phosphorus    Collection Time: 02/14/23 11:31 PM   Result Value Ref Range    Phosphorus 2.8 2.5 - 4.9 MG/DL   Magnesium    Collection Time: 02/14/23 11:31 PM   Result Value Ref Range    Magnesium 1.8 1.8 - 2.4 mg/dl   POCT Glucose    Collection Time: 02/15/23 12:40 AM   Result Value Ref Range    POC Glucose 127 (H) 70 - 99 MG/DL   POCT Glucose    Collection Time: 02/15/23  5:56 AM   Result Value Ref Range    POC Glucose 136 (H) 70 - 99 MG/DL   Calcium, Ionized    Collection Time: 02/15/23  5:57 AM   Result Value Ref Range    Ionized Ca 1.18 1.12 - 1.32 mMOL/L    Calcium, Ionized 4.72 4.48 - 5.28 MG/DL   CBC with Auto Differential    Collection Time: 02/15/23  5:57 AM   Result Value Ref Range    WBC 4.4 4.0 - 10.5 K/CU MM    RBC 2.74 (L) 4.6 - 6.2 M/CU MM    Hemoglobin 7.1 (L) 13.5 - 18.0 GM/DL    Hematocrit 23.5 (L) 42 - 52 %    MCV 85.8 78 - 100 FL    MCH 25.9 (L) 27 - 31 PG    MCHC 30.2 (L) 32.0 - 36.0 %    RDW 18.3 (H) 11.7 - 14.9 %    Platelets 92 (L) 448 - 440 K/CU MM    MPV 10.5 7.5 - 11.1 FL    Differential Type AUTOMATED DIFFERENTIAL     Segs Relative 68.2 (H) 36 - 66 %    Lymphocytes % 19.3 (L) 24 - 44 %    Monocytes % 9.5 (H) 0 - 4 %    Eosinophils % 2.3 0 - 3 %    Basophils % 0.5 0 - 1 %    Segs Absolute 3.0 K/CU MM    Lymphocytes Absolute 0.9 K/CU MM    Monocytes Absolute 0.4 K/CU MM    Eosinophils Absolute 0.1 K/CU MM    Basophils Absolute 0.0 K/CU MM    Nucleated RBC % 1.1 %    Total Nucleated RBC 0.1 K/CU MM    Total Immature Neutrophil 0.01 K/CU MM    Immature Neutrophil % 0.2 0 - 0.43 %   Comprehensive Metabolic Panel w/ Reflex to MG    Collection Time: 02/15/23  5:57 AM   Result Value Ref Range    Sodium 136 135 - 145 MMOL/L    Potassium 3.7 3.5 - 5.1 MMOL/L    Chloride 104 99 - 110 mMol/L    CO2 27 21 - 32 MMOL/L    BUN 16 6 - 23 MG/DL    Creatinine 1.9 (H) 0.9 - 1.3 MG/DL    Est, Glom Filt Rate 37 (L) >60 mL/min/1.73m2    Glucose 131 (H) 70 - 99 MG/DL    Calcium 7.6 (L) 8.3 - 10.6 MG/DL    Albumin 2.2 (L) 3.4 - 5.0 GM/DL    Total Protein 5.0 (L) 6.4 - 8.2 GM/DL    Total Bilirubin 0.7 0.0 - 1.0 MG/DL    ALT 7 (L) 10 - 40 U/L    AST 11 (L) 15 - 37 IU/L    Alkaline Phosphatase 68 40 - 128 IU/L    Anion Gap 5 4 - 16   Phosphorus    Collection Time: 02/15/23  5:57 AM   Result Value Ref Range    Phosphorus 2.7 2.5 - 4.9 MG/DL   Magnesium    Collection Time: 02/15/23  5:57 AM   Result Value Ref Range    Magnesium 1.8 1.8 - 2.4 mg/dl   POCT Glucose    Collection Time: 02/15/23 12:11 PM   Result Value Ref Range    POC Glucose 164 (H) 70 - 99 MG/DL   Calcium, Ionized    Collection Time: 02/15/23  1:35 PM   Result Value Ref Range    Ionized Ca 1.17 1.12 - 1.32 mMOL/L    Calcium, Ionized 4.68 4.48 - 5.28 MG/DL        Imaging/Diagnostics Last 24 Hours   XR CHEST PORTABLE    Result Date: 2/11/2023  EXAMINATION: ONE XRAY VIEW OF THE CHEST 2/11/2023 7:29 pm COMPARISON: Chest radiograph 02/11/2023 HISTORY: ORDERING SYSTEM PROVIDED HISTORY: ETT placement TECHNOLOGIST PROVIDED HISTORY: Reason for exam:->ETT placement Reason for Exam: et and og tube placement confirmation Additional signs and symptoms: et and og tube placement confrimation FINDINGS: Lines and tubes: -ETT terminates at the superior margin of the clavicles.  -enteric tube takes a the subdiaphragmatic course and terminates out of the field of view -right-sided Cordis catheter, which terminates within the mid/upper SVC Lungs: There is indistinctness of the pulmonary vasculature with patchy opacities within the right greater than left lungs. . Pleura: Small right-sided pleural effusion. Cardiomediastinal silhouette: Enlarged cardiac silhouette. Bones: No acute osseous findings. Soft tissues: Left-sided 2 lead cardiac device. Lines and tubes as above. The ETT terminates at the level of the superior margin of the clavicles. Grossly unchanged pulmonary exam.  Findings favor cardiogenic pulmonary edema. However a superimposed infectious process cannot be excluded.      Total critical care time 36 minutes excluding all billable procedures    Electronically signed by MICKY Flaherty CNP on 2/15/2023 at 2:43 PM

## 2023-02-15 NOTE — PROGRESS NOTES
I have personally seen and examined the patient independently. I have reviewed the patient's available data,including medical history and recent test results. Reviewed and discussed note as documented by WAGNER. I agree with the physical exam findings, assessment and plan. My documented complex medical decisions constitute a substantive portion of the supervisory note. 51 minutes    Acute respiratory failure with hypoxia, hypercapnia  Hypervolemia/volume overload, mild pulmonary edema  Acute kidney injury (superimposed on CKD stage III), currently requires dialytic support (transition to intermittent)  Hypervolemia/pulmonary edema due to above  Coronary artery disease  History of chronic diastolic heart failure (suspect acute on chronic)  ICD  History of colon resection for colon cancer  Diabetes mellitus  Severe peripheral vascular disease, purulent wounds noted both lower extremities    Continue ventilatory support, attempt spontaneous breathing trial with transition off propofol to Precedex infusion  Dialysis/volume removal per nephrology service  Antimicrobial therapy for treatment of lower extremity wound infections  Glycemic control  Oral nutritional support  Ulcer, DVT prophylaxis      Complex medical decisions required for today's evaluation and management reviewed in detail during critical care rounds.     Muriel Olmstead  [phone]

## 2023-02-15 NOTE — PROGRESS NOTES
General Surgery-[facility] Day: 5    ChiefComplaint on Admission: Multiorgan failure      Subjective:     Lolita Kessler is a 67 y.o. male with multiorgan failure patient remains on the vent on CRRT. Plan [place] is to stop CRRT and SBT. Tolerating Diet NPO  ADULT TUBE FEEDING; Nasogastric; Peptide Based High Protein; Continuous; 20; Yes; 10; Q 4 hours; 65; 30; Q 4 hours; Protein; daily, via NGT. - BM.     ROS:  Review of Systems   Unable to perform ROS: Intubated     Allergies  Pcn [penicillins] and Fentanyl          Diagnosis Date    Acid reflux     Acute MI ([facility] [address] 2004, 2008    Arthritis     Back    Broken teeth     Upper Front    CAD (coronary artery disease)     Sees Dr. Leeanna Cornell [facility])     per old chart    Cerebral artery occlusion with cerebral infarction ([facility] [address]     CHF (congestive heart failure) ([facility] [address]     Chronic back pain     Chronic kidney disease     Stage IV - patient of Dr Yvrose Elder    Diabetes mellitus [facility]) Dx 1965    per old chart pt has been diabetic since age 13    Diabetic neuropathy ([facility] [address]     \"in my feet\"    H/O cardiovascular stress test 08/25/2016    H/O Doppler ultrasound 09/27/2018    Moderate disease of the right lower extremity with an JALEN of 0.72. Moderate to severe disease of the left lower extremity with an JALEN of 0.55.     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    Neuropathy     both feet    PAD (peripheral artery disease) ([facility] [address] 09/27/2018    PVD (peripheral vascular disease) ([facility] [address]     Sick sinus syndrome (HCC)     Sleep apnea     \"sleep study 3 yrs ago- could not tolerate the cpap made me too dry\"    Spinal stenosis     Teeth missing     Upper And Lower    Type 2 diabetes mellitus without complication (HCC)     WD-Chronic foot ulcer, left, with necrosis of bone ([address] 2021       Objective:     Vitals:    02/15/23 0800   BP: (!) 82/47   Pulse: 60   Resp: 17   Temp: 96.8 °F (36 °C)   SpO2: 100%       TEMPERATURE:  Current - Temp: 96.8 °F (36 °C); Max - Temp  Av.1 °F (35.6 °C)  Min: 95 °F (35 °C)  Max: 97 °F (36.1 °C)    I/O this shift:  In: 146.2 [I.V.:71.2; NG/GT:75]  Out: 248 I/O last 3 completed shifts: In: 2663.4 [I.V.:1866. 1; NG/GT:668; IV Piggyback:129.3]  Out: 7790 [Urine:238; Emesis/NG output:121]      Physical Exam:    Physical Exam  Musculoskeletal:        Feet:            Scheduled Meds:   clindamycin (CLEOCIN) IV  600 mg IntraVENous Q8H    heparin (porcine)  5,000 Units SubCUTAneous 3 times per day    sodium hypochlorite   Irrigation Daily    collagenase   Topical Daily    sodium chloride flush  5-40 mL IntraVENous 2 times per day    aspirin  81 mg Oral Daily    [Held by provider] clopidogrel  75 mg Oral Daily    ipratropium-albuterol  1 ampule Inhalation Q4H WA    chlorhexidine  15 mL Mouth/Throat BID    famotidine (PEPCID) injection  20 mg IntraVENous Daily     Continuous Infusions:   prismaSATE BGK 4/2.5      prismaSATE BGK 4/2.5 1,500 mL/hr at 02/15/23 0700    dextrose 30 mL/hr at 02/15/23 0314    prismaSATE BGK 4/2.5 200 mL/hr at 23 1346    calcium chloride CRRT infusion 50 mL/hr at 02/15/23 0725    heparin 5000 units CRRT syringe      norepinephrine Stopped (23 0813)    dextrose      sodium chloride      propofol 20 mcg/kg/min (02/15/23 0836)     PRN Meds:fentanNYL, midazolam, potassium chloride, magnesium sulfate, calcium gluconate **OR** calcium gluconate **OR** calcium gluconate **OR** calcium gluconate, sodium phosphate IVPB **OR** sodium phosphate IVPB **OR** sodium phosphate IVPB **OR** sodium phosphate IVPB, heparin 5000 units CRRT syringe, glucose, dextrose bolus **OR** dextrose bolus, glucagon (rDNA), dextrose, sodium chloride flush, sodium chloride      Labs/Imaging Results:   Lab Results   Component Value Date    WBC 4.4 02/15/2023    HGB 7.1 (L) 02/15/2023    HCT 23.5 (L) 02/15/2023    MCV 85.8 02/15/2023    PLT 92 (L) 02/15/2023     Lab Results   Component Value Date     02/15/2023    K 3.7 02/15/2023     02/15/2023    CO2 27 02/15/2023    BUN 16 02/15/2023    CREATININE 1.9 (H) 02/15/2023    GLUCOSE 131 (H) 02/15/2023    CALCIUM 7.6 (L) 02/15/2023    PROT 5.0 (L) 02/15/2023    LABALBU 2.2 (L) 02/15/2023    BILITOT 0.7 02/15/2023    ALKPHOS 68 02/15/2023    AST 11 (L) 02/15/2023    ALT 7 (L) 02/15/2023    LABGLOM 37 (L) 02/15/2023    GFRAA 25 (L) 10/17/2022       Assessment:     Patient Active Problem List:     PAD (peripheral artery disease) ([facility])     Chronic coronary artery disease     Biventricular ICD (implantable cardioverter-defibrillator) in place     Chronic combined systolic and diastolic heart failure ([facility])     Chronic kidney disease, stage III (moderate) ([facility])     Mixed hyperlipidemia     Sick sinus syndrome ([facility])     Type 2 diabetes mellitus with diabetic polyneuropathy ([facility] [address]     Spinal stenosis of lumbar region     Obesity, Class I, BMI 30-34.9     S/P partial colectomy     Tubulovillous adenoma of colon     Microalbuminuria     WD-PVD (peripheral vascular disease) ([facility])     Limb ischemia     Necrotic toes ([facility])     Toe gangrene ([facility])     Diabetic foot infection ([address]     Chronic kidney disease (CKD) stage G3a/A2, moderately decreased glomerular filtration rate (GFR) between 45-59 mL/min/1.73 square meter and albuminuria creatinine ratio between  mg/g ([facility])     Edema     ICD (implantable cardioverter-defibrillator) battery depletion     Hyperkalemia     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[facility])     Ischemia of toe     Acute kidney injury ([facility] [address]     Fluid overload     DM (diabetes mellitus) (HCC)     Precordial pain     Acute chest pain     Unstable angina ([address]     Chronic kidney disease (CKD) stage G3a/A3, moderately decreased glomerular filtration rate (GFR) between 45-59 mL/min/1.73 square meter and albuminuria creatinine ratio greater than 300 mg/g ([facility])     Cardiomyopathy (HCC)     Diabetic neuropathy (HCC)     HTN (hypertension)     Epigastric pain     Acute on chronic congestive heart failure (HCC)     Leg edema     Acute on chronic systolic CHF (congestive heart failure) ([facility])     Diabetic foot ulcer with osteomyelitis ([address]     Visit for wound check     Moderate malnutrition ([address]     Long term (current) use of antibiotics     WD-Diabetic ulcer of toe of right foot associated with type 2 diabetes mellitus, with fat layer exposed ([address]     Diabetic ulcer of toe associated with type 2 diabetes mellitus, with bone involvement without evidence of necrosis ([address]     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     Persistent wound pain     Receiving intravenous antibiotic treatment as outpatient     Acute on chronic respiratory failure with hypoxemia ([address]     WD-Chronic foot ulcer, left, with necrosis of bone ([address]     Acute on chronic HFrEF (heart failure with reduced ejection fraction) ([facility])     Scrotal edema     Hypertensive urgency     Diabetic ulcer of right foot due to type 2 diabetes mellitus ([address]     Cellulitis of foot     Toe osteomyelitis ([facility])     Heart failure exacerbated by sotalol ([facility])     CHF (congestive heart failure), NYHA class I, acute on chronic, combined ([facility])     Acute on chronic diastolic CHF (congestive heart failure) ([facility])     Leg swelling     Acute on chronic diastolic heart failure ([facility])     Skin ulcer of left foot including toes with fat layer exposed ([address]     Superficial incisional surgical site infection     Bacteremia due to Enterococcus     Acute respiratory failure with hypoxia and hypercapnia ([facility])     Acute kidney injury superimposed on CKD ([address]     Diabetic foot ([address]     Diabetic ulcer of midfoot associated with diabetes mellitus due to underlying condition, with muscle involvement without evidence of necrosis ([address]     Other seizures ([facility] [address]      Plan:      Continue local wound care to bilateral feet for now.       Kameron Cerrato PA-C

## 2023-02-15 NOTE — PROGRESS NOTES
CKST (continuous kidney  supportive therapy )note   Day 4    Pt seen on CKST  .  [place]    Access : Right IJ temporary dialysis catheter    BP : 107/56    Blood flow: 150 ml/min     Pressor : None    EF(effluent flow)  : 21 ml/kg/h  UFR(ultrafiltration rate) : 10 ml/kg/h    FF(filtration fraction)  : 25%     AC(anticoagulation) : None    As he is without pressor and hemodynamically stable-also electrolyte, acid-base status and fluid status is acceptable-we will stop the continuous dialysis on the machine clot-and will try intermittent dialysis as needed

## 2023-02-15 NOTE — PLAN OF CARE
Problem: Discharge Planning  Goal: Discharge to home or other facility with appropriate resources  Outcome: Progressing     Problem: Pain  Goal: Verbalizes/displays adequate comfort level or baseline comfort level  Outcome: Progressing     Problem: Confusion  Goal: Confusion, delirium, dementia, or psychosis is improved or at baseline  Description: INTERVENTIONS:  1. Assess for possible contributors to thought disturbance, including medications, impaired vision or hearing, underlying metabolic abnormalities, dehydration, psychiatric diagnoses, and notify attending LIP  2. [place] high risk fall precautions, as indicated  3. Provide frequent short contacts to provide reality reorientation, refocusing and direction  4. Decrease environmental stimuli, including noise as appropriate  5. Monitor and intervene to maintain adequate nutrition, hydration, elimination, sleep and activity  6. If unable to ensure safety without constant attention obtain sitter and review sitter guidelines with assigned personnel  7. Initiate Psychosocial CNS and Spiritual Care consult, as indicated  Outcome: Progressing     Problem: Chronic Conditions and Co-morbidities  Goal: Patient's chronic conditions and co-morbidity symptoms are monitored and maintained or improved  Outcome: Progressing     Problem: Nutrition Deficit:  Goal: Optimize nutritional status  Outcome: Progressing     Problem: Skin/Tissue Integrity  Goal: Absence of new skin breakdown  Description: 1. Monitor for areas of redness and/or skin breakdown  2. Assess vascular access sites hourly  3. Every 4-6 hours minimum:  Change oxygen saturation probe site  4. Every 4-6 hours:  If on nasal continuous positive airway pressure, respiratory therapy assess nares and determine need for appliance change or resting period.   Outcome: Progressing     Problem: Safety - Adult  Goal: Free from fall injury  Outcome: Progressing

## 2023-02-15 NOTE — PROGRESS NOTES
General Surgery-[facility] Day: 4    ChiefComplaint on Admission: Multiorgan failure      Subjective:     Helen Bernstein is a 67 y.o. male with multiorgan failure patient remains on the vent on CRRT. Tolerating Diet NPO  ADULT TUBE FEEDING; Nasogastric; Peptide Based High Protein; Continuous; 20; Yes; 10; Q 4 hours; 65; 30; Q 4 hours; Protein; daily, via NGT. - BM.     ROS:  Review of Systems   Unable to perform ROS: Intubated     Allergies  Pcn [penicillins] and Fentanyl          Diagnosis Date    Acid reflux     Acute MI ([facility] 2004, 2008    Arthritis     Back    Broken teeth     Upper Front    CAD (coronary artery disease)     Sees Dr. Marie West [facility])     per old chart    Cerebral artery occlusion with cerebral infarction ([facility]     CHF (congestive heart failure) ([facility]     Chronic back pain     Chronic kidney disease     Stage IV - patient of Dr Julia Owens    Diabetes mellitus [facility]) Dx 1965    per old chart pt has been diabetic since age 13    Diabetic neuropathy ([facility]     \"in my feet\"    H/O cardiovascular stress test 08/25/2016    H/O Doppler ultrasound 09/27/2018    Moderate disease of the right lower extremity with an JALEN of 0.72. Moderate to severe disease of the left lower extremity with an JALEN of 0.55.     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    Neuropathy     both feet    PAD (peripheral artery disease) ([address] 09/27/2018    PVD (peripheral vascular disease) ([address]     Sick sinus syndrome (HCC)     Sleep apnea     \"sleep study 3 yrs ago- could not tolerate the cpap made me too dry\"    Spinal stenosis     Teeth missing     Upper And Lower    Type 2 diabetes mellitus without complication [facility]     WD-Chronic foot ulcer, left, with necrosis of bone ([facility] 2021       Objective:     Vitals:    23   BP: (!) 99/48   Pulse: 60   Resp: 16   Temp:    SpO2: 99%       TEMPERATURE:  Current - Temp: 97 °F (36.1 °C); Max - Temp  Av °F (35.6 °C)  Min: 95.2 °F (35.1 °C)  Max: 97 °F (36.1 °C)    I/O this shift:  In: 677.3 [I.V.:586.4; NG/GT:30; IV Piggyback:61]  Out: 462 [Urine:10]I/O last 3 completed shifts:   In: 1568.2 [I.V.:1290.2; NG/GT:278]  Out: 7227 [Urine:527; Emesis/NG output:361]      Physical Exam:    Physical Exam  Musculoskeletal:        Feet:            Scheduled Meds:   clindamycin (CLEOCIN) IV  600 mg IntraVENous Q8H    heparin (porcine)  5,000 Units SubCUTAneous 3 times per day    sodium hypochlorite   Irrigation Daily    collagenase   Topical Daily    sodium chloride flush  5-40 mL IntraVENous 2 times per day    aspirin  81 mg Oral Daily    [Held by provider] clopidogrel  75 mg Oral Daily    ipratropium-albuterol  1 ampule Inhalation Q4H WA    chlorhexidine  15 mL Mouth/Throat BID    famotidine (PEPCID) injection  20 mg IntraVENous Daily     Continuous Infusions:   prismaSol BGK 2/0      prismaSol BGK 2/0      dextrose 30 mL/hr at 23 1904    prismaSATE BGK 4/2.5 200 mL/hr at 23 1346    calcium chloride CRRT infusion 8,000 mg (23 1623)    heparin 5000 units CRRT syringe      norepinephrine Stopped (23 08)    dextrose      sodium chloride      propofol 20 mcg/kg/min (23 190)     PRN Meds:fentanNYL, midazolam, potassium chloride, magnesium sulfate, calcium gluconate **OR** calcium gluconate **OR** calcium gluconate **OR** calcium gluconate, sodium phosphate IVPB **OR** sodium phosphate IVPB **OR** sodium phosphate IVPB **OR** sodium phosphate IVPB, heparin 5000 units CRRT syringe, glucose, dextrose bolus **OR** dextrose bolus, glucagon (rDNA), dextrose, sodium chloride flush, sodium chloride      Labs/Imaging Results:   Lab Results   Component Value Date    WBC 4.2 02/14/2023    HGB 7.7 (L) 02/14/2023    HCT 25.8 (L) 02/14/2023    MCV 85.7 02/14/2023     (L) 02/14/2023     Lab Results   Component Value Date     (L) 02/14/2023    K 3.8 02/14/2023     02/14/2023    CO2 26 02/14/2023    BUN 20 02/14/2023    CREATININE 2.1 (H) 02/14/2023    GLUCOSE 120 (H) 02/14/2023    CALCIUM 8.4 02/14/2023    PROT 5.1 (L) 02/14/2023    LABALBU 2.2 (L) 02/14/2023    BILITOT 0.8 02/14/2023    ALKPHOS 70 02/14/2023    AST 14 (L) 02/14/2023    ALT 8 (L) 02/14/2023    LABGLOM 33 (L) 02/14/2023    GFRAA 25 (L) 10/17/2022       Assessment:     Patient Active Problem List:     PAD (peripheral artery disease) ([facility])     Chronic coronary artery disease     Biventricular ICD (implantable cardioverter-defibrillator) in place     Chronic combined systolic and diastolic heart failure ([facility])     Chronic kidney disease, stage III (moderate) ([facility])     Mixed hyperlipidemia     Sick sinus syndrome ([facility])     Type 2 diabetes mellitus with diabetic polyneuropathy ([facility]     Spinal stenosis of lumbar region     Obesity, Class I, BMI 30-34.9     S/P partial colectomy     Tubulovillous adenoma of colon     Microalbuminuria     WD-PVD (peripheral vascular disease) ([facility])     Limb ischemia     Necrotic toes ([facility])     Toe gangrene ([facility])     Diabetic foot infection ([facility]     Chronic kidney disease (CKD) stage G3a/A2, moderately decreased glomerular filtration rate (GFR) between 45-59 mL/min/1.73 square meter and albuminuria creatinine ratio between  mg/g ([facility])     Edema     ICD (implantable cardioverter-defibrillator) battery depletion     Hyperkalemia     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[facility])     Ischemia of toe     Acute kidney injury ([facility]     Fluid overload     DM (diabetes mellitus) ([facility])     Precordial pain     Acute chest pain     Unstable angina (HCC)     Chronic kidney disease (CKD) stage G3a/A3, moderately decreased glomerular filtration rate (GFR) between 45-59 mL/min/1.73 square meter and albuminuria creatinine ratio greater than 300 mg/g (HCC)     Cardiomyopathy (HCC)     Diabetic neuropathy (HCC)     HTN (hypertension)     Epigastric pain     Acute on chronic congestive heart failure (HCC)     Leg edema     Acute on chronic systolic CHF (congestive heart failure) (HCC)     Diabetic foot ulcer with osteomyelitis ([address]     Visit for wound check     Moderate malnutrition ([address]     Long term (current) use of antibiotics     WD-Diabetic ulcer of toe of right foot associated with type 2 diabetes mellitus, with fat layer exposed ([address]     Diabetic ulcer of toe associated with type 2 diabetes mellitus, with bone involvement without evidence of necrosis ([address]     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     Persistent wound pain     Receiving intravenous antibiotic treatment as outpatient     Acute on chronic respiratory failure with hypoxemia ([address]     WD-Chronic foot ulcer, left, with necrosis of bone ([address]     Acute on chronic HFrEF (heart failure with reduced ejection fraction) ([facility])     Scrotal edema     Hypertensive urgency     Diabetic ulcer of right foot due to type 2 diabetes mellitus ([address]     Cellulitis of foot     Toe osteomyelitis (HCC)     Heart failure exacerbated by sotalol (HCC)     CHF (congestive heart failure), NYHA class I, acute on chronic, combined (HCC)     Acute on chronic diastolic CHF (congestive heart failure) (HCC)     Leg swelling     Acute on chronic diastolic heart failure (HCC)     Skin ulcer of left foot including toes with fat layer exposed ([address]     Superficial incisional surgical site infection     Bacteremia due to Enterococcus     Acute respiratory failure with hypoxia and hypercapnia (HCC)     Acute kidney injury superimposed on CKD ([address]     Diabetic foot ([address]     Diabetic ulcer of midfoot associated with diabetes mellitus due to underlying condition, with muscle involvement without evidence of necrosis ([address]     Other seizures ([address]      Plan:       I was present at time of wound dressing. The worst of his wounds is his right great toe area. Culture was sent from the wound. No indication that this was caused sepsis. Continue local wound care for now.       Corinne Pringle, MD

## 2023-02-15 NOTE — PROGRESS NOTES
V2.0  [facility] [place]      Name:  Moira Milligan /Age/Sex: 1950  (67 y.o. male)   MRN & CSN:  [id number] & [id number] Encounter Date/Time: 2/15/2023 6:51 PM EST    Location:  -A PCP: Orion Pedroza Day: 5    Assessment and Plan:   Moira Milligan is a 67 y.o. male with pmh of  CAD, HFpEF, ICD, CKD, DM who presents with Acute respiratory failure with hypoxia and hypercapnia ([address]      Plan:  Acute hypoxic Hypercapneic respiratory failure: was initially placed on BIPAP but later was intubated on MV was started on Empiric broad spectrum Abx --> continue clinda  Acute renal failure with h/o CKD stage 3: was initially started on HD but could not tolerate. Continue CRRT  Hyperkalemia-resolved: in the setting of above, improving  Elevated troponin: likely type 2 MI in the setting of respiratory as well as kidney failure, does have h/o CAD with multiple stents, Cardio on board. HFpEF:  EF 50-55% in . Repeat echo with simillar EF, hypokinetic RV with bowing of Interventricular septum towards LV. Chronic LE wounds: Wound care on board. Likely infected, Purulent drainage from RLE 1st metatarsal which is foul smelling. General surgery on consult, appreciate recs. Wound cultures sent, follow up    Diet Diet NPO  ADULT TUBE FEEDING; Nasogastric; Peptide Based High Protein; Continuous; 20; Yes; 10; Q 4 hours; 65; 30; Q 4 hours; Protein; daily, via NGT   DVT Prophylaxis [] Lovenox, [x]  Heparin, [] SCDs, [] Ambulation,  [] Eliquis, [] Xarelto  [] Coumadin   Code Status Full Code   Disposition From: Home  Expected Disposition: TBD  Estimated Date of Discharge: TBD  Patient requires continued admission due to Respiratory and renal failure   Surrogate Decision Maker/ POA      Subjective:     Chief Complaint: Respiratory Distress     Patient intubated      Review of Systems:    Review of Systems  Could not be obtained, patient intubated    Objective:      Intake/Output Summary (Last 24 hours) at 2/15/2023 0851  Last data filed at 2/15/2023 0812  Gross per 24 hour   Intake 2039.03 ml   Output 5691 ml   Net -3651.97 ml          Vitals:   Vitals:    02/15/23 0800   BP: (!) 82/47   Pulse: 60   Resp: 17   Temp: 96.8 °F (36 °C)   SpO2: 100%       Physical Exam:   Physical Exam General: Ill-appearing male, NAD, intubated  Eyes: EOMI  ENT: neck supple, ETT  Cardiovascular: Regular rate.  Respiratory: Clear to auscultation, mechanical ventilation  Gastrointestinal: Obese, soft, non tender  Genitourinary: no suprapubic tenderness, Fuller catheter  Musculoskeletal: wounds covered in bandages   Skin: warm, dry  Neuro: Unresponsive on ventilator.      Medications:   Medications:    clindamycin (CLEOCIN) IV  600 mg IntraVENous Q8H    heparin (porcine)  5,000 Units SubCUTAneous 3 times per day    sodium hypochlorite   Irrigation Daily    collagenase   Topical Daily    sodium chloride flush  5-40 mL IntraVENous 2 times per day    aspirin  81 mg Oral Daily    [Held by provider] clopidogrel  75 mg Oral Daily    ipratropium-albuterol  1 ampule Inhalation Q4H WA    chlorhexidine  15 mL Mouth/Throat BID    famotidine (PEPCID) injection  20 mg IntraVENous Daily      Infusions:    prismaSATE BGK 4/2.5      prismaSATE BGK 4/2.5 1,500 mL/hr at 02/15/23 0700    dextrose 30 mL/hr at 02/15/23 0314    prismaSATE BGK 4/2.5 200 mL/hr at 02/14/23 1346    calcium chloride CRRT infusion 50 mL/hr at 02/15/23 0725    heparin 5000 units CRRT syringe      norepinephrine Stopped (02/14/23 0813)    dextrose      sodium chloride      propofol 20 mcg/kg/min (02/15/23 0836)     PRN Meds: fentanNYL, 50 mcg, Q1H PRN  midazolam, 1 mg, Q2H PRN  potassium chloride, 20 mEq, PRN  magnesium sulfate, 1,000 mg, PRN  calcium gluconate, 1,000 mg, PRN   Or  calcium gluconate, 2,000 mg, PRN   Or  calcium gluconate, 3,000 mg, PRN   Or  calcium gluconate, 4,000 mg, PRN  sodium phosphate IVPB, 6 mmol, PRN   Or  sodium phosphate IVPB, 12 mmol, PRN    Or  sodium phosphate IVPB, 18 mmol, PRN   Or  sodium phosphate IVPB, 24 mmol, PRN  heparin 5000 units CRRT syringe, , Continuous PRN  glucose, 4 tablet, PRN  dextrose bolus, 125 mL, PRN   Or  dextrose bolus, 250 mL, PRN  glucagon (rDNA), 1 mg, PRN  dextrose, , Continuous PRN  sodium chloride flush, 10 mL, PRN  sodium chloride, , PRN      Labs      Recent Results (from the past 24 hour(s))   POCT Glucose    Collection Time: 02/14/23 11:23 AM   Result Value Ref Range    POC Glucose 105 (H) 70 - 99 MG/DL   Calcium, Ionized    Collection Time: 02/14/23  1:50 PM   Result Value Ref Range    Ionized Ca 1.24 1.12 - 1.32 mMOL/L    Calcium, Ionized 4.96 4.48 - 5.28 MG/DL   Comprehensive Metabolic Panel w/ Reflex to MG    Collection Time: 02/14/23  3:20 PM   Result Value Ref Range    Sodium 134 (L) 135 - 145 MMOL/L    Potassium 3.8 3.5 - 5.1 MMOL/L    Chloride 103 99 - 110 mMol/L    CO2 26 21 - 32 MMOL/L    BUN 20 6 - 23 MG/DL    Creatinine 2.1 (H) 0.9 - 1.3 MG/DL    Est, Glom Filt Rate 33 (L) >60 mL/min/1.73m2    Glucose 120 (H) 70 - 99 MG/DL    Calcium 8.4 8.3 - 10.6 MG/DL    Albumin 2.2 (L) 3.4 - 5.0 GM/DL    Total Protein 5.1 (L) 6.4 - 8.2 GM/DL    Total Bilirubin 0.8 0.0 - 1.0 MG/DL    ALT 8 (L) 10 - 40 U/L    AST 14 (L) 15 - 37 IU/L    Alkaline Phosphatase 70 40 - 128 IU/L    Anion Gap 5 4 - 16   Phosphorus    Collection Time: 02/14/23  3:20 PM   Result Value Ref Range    Phosphorus 3.1 2.5 - 4.9 MG/DL   Magnesium    Collection Time: 02/14/23  3:20 PM   Result Value Ref Range    Magnesium 1.8 1.8 - 2.4 mg/dl   Calcium, Ionized    Collection Time: 02/14/23  9:18 PM   Result Value Ref Range    Ionized Ca 1.19 1.12 - 1.32 mMOL/L    Calcium, Ionized 4.76 4.48 - 5.28 MG/DL   POCT Glucose    Collection Time: 02/14/23  9:26 PM   Result Value Ref Range    POC Glucose 117 (H) 70 - 99 MG/DL   Comprehensive Metabolic Panel w/ Reflex to MG    Collection Time: 02/14/23 11:31 PM   Result Value Ref Range    Sodium 134 (L) 135 - 145 MMOL/L    Potassium 3.9 3.5 - 5.1 MMOL/L    Chloride 104 99 - 110 mMol/L    CO2 26 21 - 32 MMOL/L    BUN 16 6 - 23 MG/DL    Creatinine 1.7 (H) 0.9 - 1.3 MG/DL    Est, Glom Filt Rate 42 (L) >60 mL/min/1.73m2    Glucose 128 (H) 70 - 99 MG/DL    Calcium 7.7 (L) 8.3 - 10.6 MG/DL    Albumin 2.3 (L) 3.4 - 5.0 GM/DL    Total Protein 5.3 (L) 6.4 - 8.2 GM/DL    Total Bilirubin 0.8 0.0 - 1.0 MG/DL    ALT 7 (L) 10 - 40 U/L    AST 15 15 - 37 IU/L    Alkaline Phosphatase 69 40 - 129 IU/L    Anion Gap 4 4 - 16   Phosphorus    Collection Time: 02/14/23 11:31 PM   Result Value Ref Range    Phosphorus 2.8 2.5 - 4.9 MG/DL   Magnesium    Collection Time: 02/14/23 11:31 PM   Result Value Ref Range    Magnesium 1.8 1.8 - 2.4 mg/dl   POCT Glucose    Collection Time: 02/15/23 12:40 AM   Result Value Ref Range    POC Glucose 127 (H) 70 - 99 MG/DL   POCT Glucose    Collection Time: 02/15/23  5:56 AM   Result Value Ref Range    POC Glucose 136 (H) 70 - 99 MG/DL   Calcium, Ionized    Collection Time: 02/15/23  5:57 AM   Result Value Ref Range    Ionized Ca 1.18 1.12 - 1.32 mMOL/L    Calcium, Ionized 4.72 4.48 - 5.28 MG/DL   CBC with Auto Differential    Collection Time: 02/15/23  5:57 AM   Result Value Ref Range    WBC 4.4 4.0 - 10.5 K/CU MM    RBC 2.74 (L) 4.6 - 6.2 M/CU MM    Hemoglobin 7.1 (L) 13.5 - 18.0 GM/DL    Hematocrit 23.5 (L) 42 - 52 %    MCV 85.8 78 - 100 FL    MCH 25.9 (L) 27 - 31 PG    MCHC 30.2 (L) 32.0 - 36.0 %    RDW 18.3 (H) 11.7 - 14.9 %    Platelets 92 (L) 584 - 440 K/CU MM    MPV 10.5 7.5 - 11.1 FL    Differential Type AUTOMATED DIFFERENTIAL     Segs Relative 68.2 (H) 36 - 66 %    Lymphocytes % 19.3 (L) 24 - 44 %    Monocytes % 9.5 (H) 0 - 4 %    Eosinophils % 2.3 0 - 3 %    Basophils % 0.5 0 - 1 %    Segs Absolute 3.0 K/CU MM    Lymphocytes Absolute 0.9 K/CU MM    Monocytes Absolute 0.4 K/CU MM    Eosinophils Absolute 0.1 K/CU MM    Basophils Absolute 0.0 K/CU MM    Nucleated RBC % 1.1 %    Total Nucleated RBC 0.1 K/CU MM    Total Immature Neutrophil 0.01 K/CU MM    Immature Neutrophil % 0.2 0 - 0.43 %   Comprehensive Metabolic Panel w/ Reflex to MG    Collection Time: 02/15/23  5:57 AM   Result Value Ref Range    Sodium 136 135 - 145 MMOL/L    Potassium 3.7 3.5 - 5.1 MMOL/L    Chloride 104 99 - 110 mMol/L    CO2 27 21 - 32 MMOL/L    BUN 16 6 - 23 MG/DL    Creatinine 1.9 (H) 0.9 - 1.3 MG/DL    Est, Glom Filt Rate 37 (L) >60 mL/min/1.73m2    Glucose 131 (H) 70 - 99 MG/DL    Calcium 7.6 (L) 8.3 - 10.6 MG/DL    Albumin 2.2 (L) 3.4 - 5.0 GM/DL    Total Protein 5.0 (L) 6.4 - 8.2 GM/DL    Total Bilirubin 0.7 0.0 - 1.0 MG/DL    ALT 7 (L) 10 - 40 U/L    AST 11 (L) 15 - 37 IU/L    Alkaline Phosphatase 68 40 - 128 IU/L    Anion Gap 5 4 - 16   Phosphorus    Collection Time: 02/15/23  5:57 AM   Result Value Ref Range    Phosphorus 2.7 2.5 - 4.9 MG/DL   Magnesium    Collection Time: 02/15/23  5:57 AM   Result Value Ref Range    Magnesium 1.8 1.8 - 2.4 mg/dl        Imaging/Diagnostics Last 24 Hours   XR CHEST PORTABLE    Result Date: 2/11/2023  EXAMINATION: ONE XRAY VIEW OF THE CHEST 2/11/2023 7:29 pm COMPARISON: Chest radiograph 02/11/2023 HISTORY: ORDERING SYSTEM PROVIDED HISTORY: ETT placement TECHNOLOGIST PROVIDED HISTORY: Reason for exam:->ETT placement Reason for Exam: et and og tube placement confirmation Additional signs and symptoms: et and og tube placement confrimation FINDINGS: Lines and tubes: -ETT terminates at the superior margin of the clavicles. -enteric tube takes a the subdiaphragmatic course and terminates out of the field of view -right-sided Cordis catheter, which terminates within the mid/upper SVC Lungs: There is indistinctness of the pulmonary vasculature with patchy opacities within the right greater than left lungs. . Pleura: Small right-sided pleural effusion. Cardiomediastinal silhouette: Enlarged cardiac silhouette. Bones: No acute osseous findings. Soft tissues: Left-sided 2 lead cardiac device.      Lines and tubes as above. The ETT terminates at the level of the superior margin of the clavicles. Grossly unchanged pulmonary exam.  Findings favor cardiogenic pulmonary edema. However a superimposed infectious process cannot be excluded.        Electronically signed by Morris Randle MD on 2/15/2023 at 8:51 AM

## 2023-02-16 ENCOUNTER — APPOINTMENT (OUTPATIENT)
Dept: GENERAL RADIOLOGY | Age: 73
DRG: 981 | End: 2023-02-16
Payer: MEDICARE

## 2023-02-16 LAB
ALBUMIN SERPL-MCNC: 2.4 GM/DL (ref 3.4–5)
ALP BLD-CCNC: 82 IU/L (ref 40–128)
ALT SERPL-CCNC: 7 U/L (ref 10–40)
ANION GAP SERPL CALCULATED.3IONS-SCNC: 8 MMOL/L (ref 4–16)
AST SERPL-CCNC: 12 IU/L (ref 15–37)
BASOPHILS ABSOLUTE: 0 K/CU MM
BASOPHILS RELATIVE PERCENT: 0.5 % (ref 0–1)
BILIRUB SERPL-MCNC: 0.8 MG/DL (ref 0–1)
BUN SERPL-MCNC: 18 MG/DL (ref 6–23)
CALCIUM IONIZED: 4.56 MG/DL (ref 4.48–5.28)
CALCIUM SERPL-MCNC: 7.9 MG/DL (ref 8.3–10.6)
CHLORIDE BLD-SCNC: 102 MMOL/L (ref 99–110)
CO2: 26 MMOL/L (ref 21–32)
CREAT SERPL-MCNC: 2.2 MG/DL (ref 0.9–1.3)
CULTURE: NORMAL
CULTURE: NORMAL
DIFFERENTIAL TYPE: ABNORMAL
EOSINOPHILS ABSOLUTE: 0.1 K/CU MM
EOSINOPHILS RELATIVE PERCENT: 1.4 % (ref 0–3)
GFR SERPL CREATININE-BSD FRML MDRD: 31 ML/MIN/1.73M2
GLUCOSE BLD-MCNC: 179 MG/DL (ref 70–99)
GLUCOSE BLD-MCNC: 181 MG/DL (ref 70–99)
GLUCOSE BLD-MCNC: 182 MG/DL (ref 70–99)
GLUCOSE BLD-MCNC: 193 MG/DL (ref 70–99)
GLUCOSE BLD-MCNC: 198 MG/DL (ref 70–99)
GLUCOSE BLD-MCNC: 209 MG/DL (ref 70–99)
GLUCOSE SERPL-MCNC: 179 MG/DL (ref 70–99)
HCT VFR BLD CALC: 25.3 % (ref 42–52)
HEMOGLOBIN: 7.5 GM/DL (ref 13.5–18)
IMMATURE NEUTROPHIL %: 0.3 % (ref 0–0.43)
IONIZED CA: 1.14 MMOL/L (ref 1.12–1.32)
LYMPHOCYTES ABSOLUTE: 0.9 K/CU MM
LYMPHOCYTES RELATIVE PERCENT: 15.8 % (ref 24–44)
Lab: NORMAL
Lab: NORMAL
MAGNESIUM: 2 MG/DL (ref 1.8–2.4)
MCH RBC QN AUTO: 25.7 PG (ref 27–31)
MCHC RBC AUTO-ENTMCNC: 29.6 % (ref 32–36)
MCV RBC AUTO: 86.6 FL (ref 78–100)
MONOCYTES ABSOLUTE: 0.6 K/CU MM
MONOCYTES RELATIVE PERCENT: 10.5 % (ref 0–4)
NUCLEATED RBC %: 0.5 %
PDW BLD-RTO: 18.6 % (ref 11.7–14.9)
PHOSPHORUS: 2.8 MG/DL (ref 2.5–4.9)
PLATELET # BLD: 115 K/CU MM (ref 140–440)
PMV BLD AUTO: 11.1 FL (ref 7.5–11.1)
POTASSIUM SERPL-SCNC: 4 MMOL/L (ref 3.5–5.1)
RBC # BLD: 2.92 M/CU MM (ref 4.6–6.2)
SEGMENTED NEUTROPHILS ABSOLUTE COUNT: 4.2 K/CU MM
SEGMENTED NEUTROPHILS RELATIVE PERCENT: 71.5 % (ref 36–66)
SODIUM BLD-SCNC: 136 MMOL/L (ref 135–145)
SPECIMEN: NORMAL
SPECIMEN: NORMAL
TOTAL IMMATURE NEUTOROPHIL: 0.02 K/CU MM
TOTAL NUCLEATED RBC: 0 K/CU MM
TOTAL PROTEIN: 5.6 GM/DL (ref 6.4–8.2)
WBC # BLD: 5.8 K/CU MM (ref 4–10.5)

## 2023-02-16 PROCEDURE — 80053 COMPREHEN METABOLIC PANEL: CPT

## 2023-02-16 PROCEDURE — 2500000003 HC RX 250 WO HCPCS

## 2023-02-16 PROCEDURE — 82962 GLUCOSE BLOOD TEST: CPT

## 2023-02-16 PROCEDURE — 82330 ASSAY OF CALCIUM: CPT

## 2023-02-16 PROCEDURE — 2580000003 HC RX 258: Performed by: SPECIALIST

## 2023-02-16 PROCEDURE — 71045 X-RAY EXAM CHEST 1 VIEW: CPT

## 2023-02-16 PROCEDURE — 31500 INSERT EMERGENCY AIRWAY: CPT

## 2023-02-16 PROCEDURE — 99233 SBSQ HOSP IP/OBS HIGH 50: CPT | Performed by: SURGERY

## 2023-02-16 PROCEDURE — 94640 AIRWAY INHALATION TREATMENT: CPT

## 2023-02-16 PROCEDURE — 5A1945Z RESPIRATORY VENTILATION, 24-96 CONSECUTIVE HOURS: ICD-10-PCS | Performed by: SPECIALIST

## 2023-02-16 PROCEDURE — 0W9930Z DRAINAGE OF RIGHT PLEURAL CAVITY WITH DRAINAGE DEVICE, PERCUTANEOUS APPROACH: ICD-10-PCS | Performed by: SPECIALIST

## 2023-02-16 PROCEDURE — 6360000002 HC RX W HCPCS: Performed by: SPECIALIST

## 2023-02-16 PROCEDURE — 84100 ASSAY OF PHOSPHORUS: CPT

## 2023-02-16 PROCEDURE — 2700000000 HC OXYGEN THERAPY PER DAY

## 2023-02-16 PROCEDURE — 0BH17EZ INSERTION OF ENDOTRACHEAL AIRWAY INTO TRACHEA, VIA NATURAL OR ARTIFICIAL OPENING: ICD-10-PCS | Performed by: SPECIALIST

## 2023-02-16 PROCEDURE — 2500000003 HC RX 250 WO HCPCS: Performed by: SPECIALIST

## 2023-02-16 PROCEDURE — 6370000000 HC RX 637 (ALT 250 FOR IP): Performed by: NURSE PRACTITIONER

## 2023-02-16 PROCEDURE — 85025 COMPLETE CBC W/AUTO DIFF WBC: CPT

## 2023-02-16 PROCEDURE — 6360000002 HC RX W HCPCS: Performed by: NURSE PRACTITIONER

## 2023-02-16 PROCEDURE — 2000000000 HC ICU R&B

## 2023-02-16 PROCEDURE — 89220 SPUTUM SPECIMEN COLLECTION: CPT

## 2023-02-16 PROCEDURE — 94761 N-INVAS EAR/PLS OXIMETRY MLT: CPT

## 2023-02-16 PROCEDURE — 2580000003 HC RX 258: Performed by: NURSE PRACTITIONER

## 2023-02-16 PROCEDURE — 83735 ASSAY OF MAGNESIUM: CPT

## 2023-02-16 PROCEDURE — 94003 VENT MGMT INPAT SUBQ DAY: CPT

## 2023-02-16 PROCEDURE — 99233 SBSQ HOSP IP/OBS HIGH 50: CPT | Performed by: INTERNAL MEDICINE

## 2023-02-16 RX ORDER — MIDAZOLAM HYDROCHLORIDE 1 MG/ML
INJECTION INTRAMUSCULAR; INTRAVENOUS
Status: DISCONTINUED
Start: 2023-02-16 | End: 2023-02-16 | Stop reason: WASHOUT

## 2023-02-16 RX ORDER — EPINEPHRINE 0.1 MG/ML
SYRINGE (ML) INJECTION
Status: COMPLETED | OUTPATIENT
Start: 2023-02-16 | End: 2023-02-16

## 2023-02-16 RX ORDER — ROCURONIUM BROMIDE 10 MG/ML
INJECTION, SOLUTION INTRAVENOUS
Status: COMPLETED | OUTPATIENT
Start: 2023-02-16 | End: 2023-02-16

## 2023-02-16 RX ORDER — ETOMIDATE 2 MG/ML
INJECTION INTRAVENOUS
Status: COMPLETED | OUTPATIENT
Start: 2023-02-16 | End: 2023-02-16

## 2023-02-16 RX ORDER — LINEZOLID 2 MG/ML
600 INJECTION, SOLUTION INTRAVENOUS EVERY 12 HOURS
Status: DISCONTINUED | OUTPATIENT
Start: 2023-02-16 | End: 2023-02-22

## 2023-02-16 RX ADMIN — HEPARIN SODIUM 5000 UNITS: 5000 INJECTION INTRAVENOUS; SUBCUTANEOUS at 14:35

## 2023-02-16 RX ADMIN — CLINDAMYCIN PHOSPHATE 600 MG: 600 INJECTION, SOLUTION INTRAVENOUS at 04:28

## 2023-02-16 RX ADMIN — ROCURONIUM BROMIDE 100 MG: 10 SOLUTION INTRAVENOUS at 12:01

## 2023-02-16 RX ADMIN — IPRATROPIUM BROMIDE AND ALBUTEROL SULFATE 1 AMPULE: 2.5; .5 SOLUTION RESPIRATORY (INHALATION) at 15:36

## 2023-02-16 RX ADMIN — HEPARIN SODIUM 5000 UNITS: 5000 INJECTION INTRAVENOUS; SUBCUTANEOUS at 05:33

## 2023-02-16 RX ADMIN — ASPIRIN 81 MG CHEWABLE TABLET 81 MG: 81 TABLET CHEWABLE at 08:08

## 2023-02-16 RX ADMIN — PROPOFOL 20 MCG/KG/MIN: 10 INJECTION, EMULSION INTRAVENOUS at 03:04

## 2023-02-16 RX ADMIN — CEFEPIME HYDROCHLORIDE 1000 MG: 1 INJECTION, POWDER, FOR SOLUTION INTRAMUSCULAR; INTRAVENOUS at 12:53

## 2023-02-16 RX ADMIN — PROPOFOL 20 MCG/KG/MIN: 10 INJECTION, EMULSION INTRAVENOUS at 09:36

## 2023-02-16 RX ADMIN — IPRATROPIUM BROMIDE AND ALBUTEROL SULFATE 1 AMPULE: 2.5; .5 SOLUTION RESPIRATORY (INHALATION) at 07:49

## 2023-02-16 RX ADMIN — HEPARIN SODIUM 5000 UNITS: 5000 INJECTION INTRAVENOUS; SUBCUTANEOUS at 21:34

## 2023-02-16 RX ADMIN — FAMOTIDINE 20 MG: 20 TABLET ORAL at 08:07

## 2023-02-16 RX ADMIN — COLLAGENASE SANTYL: 250 OINTMENT TOPICAL at 08:28

## 2023-02-16 RX ADMIN — SODIUM HYPOCHLORITE: 1.25 SOLUTION TOPICAL at 08:28

## 2023-02-16 RX ADMIN — SODIUM CHLORIDE, PRESERVATIVE FREE 10 ML: 5 INJECTION INTRAVENOUS at 08:08

## 2023-02-16 RX ADMIN — IPRATROPIUM BROMIDE AND ALBUTEROL SULFATE 1 AMPULE: 2.5; .5 SOLUTION RESPIRATORY (INHALATION) at 12:38

## 2023-02-16 RX ADMIN — LINEZOLID 600 MG: 600 INJECTION, SOLUTION INTRAVENOUS at 12:52

## 2023-02-16 RX ADMIN — CEFEPIME HYDROCHLORIDE 1000 MG: 1 INJECTION, POWDER, FOR SOLUTION INTRAMUSCULAR; INTRAVENOUS at 23:02

## 2023-02-16 RX ADMIN — LINEZOLID 600 MG: 600 INJECTION, SOLUTION INTRAVENOUS at 22:03

## 2023-02-16 RX ADMIN — CHLORHEXIDINE GLUCONATE 0.12% ORAL RINSE 15 ML: 1.2 LIQUID ORAL at 19:35

## 2023-02-16 RX ADMIN — Medication 1 MG: at 12:04

## 2023-02-16 RX ADMIN — SODIUM CHLORIDE, PRESERVATIVE FREE 10 ML: 5 INJECTION INTRAVENOUS at 19:35

## 2023-02-16 RX ADMIN — IPRATROPIUM BROMIDE AND ALBUTEROL SULFATE 1 AMPULE: 2.5; .5 SOLUTION RESPIRATORY (INHALATION) at 19:51

## 2023-02-16 RX ADMIN — PROPOFOL 15 MCG/KG/MIN: 10 INJECTION, EMULSION INTRAVENOUS at 16:06

## 2023-02-16 RX ADMIN — ETOMIDATE 10 MG: 2 INJECTION, SOLUTION INTRAVENOUS at 12:18

## 2023-02-16 RX ADMIN — CHLORHEXIDINE GLUCONATE 0.12% ORAL RINSE 15 ML: 1.2 LIQUID ORAL at 08:07

## 2023-02-16 ASSESSMENT — PAIN SCALES - GENERAL
PAINLEVEL_OUTOF10: 0

## 2023-02-16 ASSESSMENT — PULMONARY FUNCTION TESTS
PIF_VALUE: 20
PIF_VALUE: 19
PIF_VALUE: 20
PIF_VALUE: 35
PIF_VALUE: 20
PIF_VALUE: 21
PIF_VALUE: 19
PIF_VALUE: 26
PIF_VALUE: 20
PIF_VALUE: 20
PIF_VALUE: 19
PIF_VALUE: 20
PIF_VALUE: 22
PIF_VALUE: 33
PIF_VALUE: 21
PIF_VALUE: 23
PIF_VALUE: 20
PIF_VALUE: 20
PIF_VALUE: 21
PIF_VALUE: 21
PIF_VALUE: 20

## 2023-02-16 NOTE — PROGRESS NOTES
General Surgery-[facility] Day: 6    ChiefComplaint on Admission: Multiorgan failure      Subjective:     Tegan Adamson is a 67 y.o. male with multiorgan failure patient remains on the vent on CRRT. Tolerating Diet NPO  ADULT TUBE FEEDING; Nasogastric; Peptide Based High Protein; Continuous; 20; Yes; 10; Q 4 hours; 65; 30; Q 4 hours; Protein; daily, via NGT. - BM.     ROS:  Review of Systems   Unable to perform ROS: Intubated     Allergies  Pcn [penicillins] and Fentanyl          Diagnosis Date    Acid reflux     Acute MI ([address] 2004, 2008    Arthritis     Back    Broken teeth     Upper Front    CAD (coronary artery disease)     Sees Dr. Artemio Kelley [facility])     per old chart    Cerebral artery occlusion with cerebral infarction ([facility]     CHF (congestive heart failure) ([facility]     preserved ejection fraction    Chronic back pain     Chronic kidney disease     Stage IV - patient of Dr Patricia Rodriguez    Diabetes mellitus [facility]) Dx 1965    per old chart pt has been diabetic since age 13    Diabetic neuropathy ([facility]     \"in my feet\"    H/O cardiovascular stress test 08/25/2016    H/O Doppler ultrasound 09/27/2018    Moderate disease of the right lower extremity with an JALEN of 0.72. Moderate to severe disease of the left lower extremity with an JALEN of 0.55.     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    Neuropathy     both feet    PAD (peripheral artery disease) ([address] 09/27/2018    PVD (peripheral vascular disease) ([address]     Sick sinus syndrome (HCC)     Sleep apnea     \"sleep study 3 yrs ago- could not tolerate the cpap made me too dry\" Spinal stenosis     Teeth missing     Upper And Lower    Type 2 diabetes mellitus without complication (HCC)     WD-Chronic foot ulcer, left, with necrosis of bone ([facility] 2021       Objective:     Vitals:    23 0754   BP:    Pulse: 61   Resp: 16   Temp:    SpO2: 92%       TEMPERATURE:  Current - Temp: 97.9 °F (36.6 °C); Max - Temp  Av.6 °F (36.4 °C)  Min: 96.6 °F (35.9 °C)  Max: 99.4 °F (37.4 °C)    I/O this shift: In: 41.8 [I.V.:41.8]  Out: - I/O last 3 completed shifts:   In: 2.1 [I.V.:.7; NG/GT:; IV Piggyback:212.5]  Out: 2679 [Urine:175]      Physical Exam:    Physical Exam  Musculoskeletal:        Feet:          Media Information  Document Information        Scheduled Meds:   famotidine  20 mg Per NG tube Daily    clindamycin (CLEOCIN) IV  600 mg IntraVENous Q8H    heparin (porcine)  5,000 Units SubCUTAneous 3 times per day    sodium hypochlorite   Irrigation Daily    collagenase   Topical Daily    sodium chloride flush  5-40 mL IntraVENous 2 times per day    aspirin  81 mg Oral Daily    [Held by provider] clopidogrel  75 mg Oral Daily    ipratropium-albuterol  1 ampule Inhalation Q4H WA    chlorhexidine  15 mL Mouth/Throat BID     Continuous Infusions:   dextrose      sodium chloride      propofol 20 mcg/kg/min (23 0829)     PRN Meds:heparin flush, fentanNYL, midazolam, glucose, dextrose bolus **OR** dextrose bolus, glucagon (rDNA), dextrose, sodium chloride flush, sodium chloride      Labs/Imaging Results:   Lab Results   Component Value Date    WBC 5.8 2023    HGB 7.5 (L) 2023    HCT 25.3 (L) 2023    MCV 86.6 2023     (L) 2023     Lab Results   Component Value Date     2023    K 4.0 2023     2023    CO2 26 2023    BUN 18 2023    CREATININE 2.2 (H) 2023    GLUCOSE 179 (H) 2023    CALCIUM 7.9 (L) 2023    PROT 5.6 (L) 2023    LABALBU 2.4 (L) 2023    BILITOT 0.8 02/16/2023    ALKPHOS 82 02/16/2023    AST 12 (L) 02/16/2023    ALT 7 (L) 02/16/2023    LABGLOM 31 (L) 02/16/2023    GFRAA 25 (L) 10/17/2022       Assessment:     Patient Active Problem List:     PAD (peripheral artery disease) ([address]     Chronic coronary artery disease     Biventricular ICD (implantable cardioverter-defibrillator) in place     Chronic combined systolic and diastolic heart failure ([facility])     Chronic kidney disease, stage III (moderate) ([facility])     Mixed hyperlipidemia     Sick sinus syndrome ([facility])     Type 2 diabetes mellitus with diabetic polyneuropathy ([address]     Spinal stenosis of lumbar region     Obesity, Class I, BMI 30-34.9     S/P partial colectomy     Tubulovillous adenoma of colon     Microalbuminuria     WD-PVD (peripheral vascular disease) ([facility])     Limb ischemia     Necrotic toes ([facility])     Toe gangrene ([facility])     Diabetic foot infection ([address]     Chronic kidney disease (CKD) stage G3a/A2, moderately decreased glomerular filtration rate (GFR) between 45-59 mL/min/1.73 square meter and albuminuria creatinine ratio between  mg/g ([facility])     Edema     ICD (implantable cardioverter-defibrillator) battery depletion     Hyperkalemia     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[facility])     Ischemia of toe     Acute kidney injury ([address]     Fluid overload     DM (diabetes mellitus) ([facility])     Precordial pain     Acute chest pain     Unstable angina ([facility])     Chronic kidney disease (CKD) stage G3a/A3, moderately decreased glomerular filtration rate (GFR) between 45-59 mL/min/1.73 square meter and albuminuria creatinine ratio greater than 300 mg/g ([facility])     Cardiomyopathy (HCC)     Diabetic neuropathy ([facility])     HTN (hypertension)     Epigastric pain     Acute on chronic congestive heart failure (HCC)     Leg edema     Acute on chronic systolic CHF (congestive heart failure) ([address]     Diabetic foot ulcer with osteomyelitis ([address]     Visit for wound check     Moderate malnutrition ([address]     Long term (current) use of antibiotics WD-Diabetic ulcer of toe of right foot associated with type 2 diabetes mellitus, with fat layer exposed ([address]     Diabetic ulcer of toe associated with type 2 diabetes mellitus, with bone involvement without evidence of necrosis ([address]     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     Persistent wound pain     Receiving intravenous antibiotic treatment as outpatient     Acute on chronic respiratory failure with hypoxemia ([address]     WD-Chronic foot ulcer, left, with necrosis of bone (HCC)     Acute on chronic HFrEF (heart failure with reduced ejection fraction) (HCC)     Scrotal edema     Hypertensive urgency     Diabetic ulcer of right foot due to type 2 diabetes mellitus ([address]     Cellulitis of foot     Toe osteomyelitis (HCC)     Heart failure exacerbated by sotalol (HCC)     CHF (congestive heart failure), NYHA class I, acute on chronic, combined (HCC)     Acute on chronic diastolic CHF (congestive heart failure) (HCC)     Leg swelling     Acute on chronic diastolic heart failure (HCC)     Skin ulcer of left foot including toes with fat layer exposed ([address]     Superficial incisional surgical site infection     Bacteremia due to Enterococcus     Acute respiratory failure with hypoxia and hypercapnia (HCC)     Acute kidney injury superimposed on CKD ([address]     Diabetic foot ([address]     Diabetic ulcer of midfoot associated with diabetes mellitus due to underlying condition, with muscle involvement without evidence of necrosis ([address]     Other seizures ([address]      Plan:     Cont with local wound care for now.       Corinne Pringle, MD

## 2023-02-16 NOTE — CODE DOCUMENTATION
Chief Complaint   Patient presents with   • Leg Pain     right leg   • Office Visit   • Office Visit   • Imm/Inj     Flu vaccine       HISTORY OF PRESENT ILLNESS: Scotty Venegas is a 81 year old  female who presented today secondary to symptoms of left leg pain.  Patient was recently seen and treated for right lower extremity cellulitis.  This set of symptoms is different in the sense that the pain is along the ankle and the calf and seems to be triggered by walking.  She still has tenderness of the shin area.  No sweats or chills.      Past Medical History:   Diagnosis Date   • A-fib (CMS/HCC)    • Actinic keratosis    • Aortic stenosis 2020    severe   • Arthritis    • Asthma     nothing current   • Basal cell carcinoma 01/18/2021    right lateral zygoma   • Cataract    • Chronic back pain    • Diabetes mellitus (CMS/HCC)     \"pre diabetic\"   • Fibromyalgia    • Fracture     compression frx spine   • GERD (gastroesophageal reflux disease)    • High cholesterol    • Hypermetropia of both eyes    • Hyperparathyroidism (CMS/HCC)    • Hypertension    • Liver disease     Hepatitis as teen   • Lumbar spondylosis    • Mitral valve stenosis, moderate    • Pneumonia 2018   • Regular astigmatism of both eyes with presbyopia    • Renal stones    • Sacroiliac dysfunction 12/07/2020    bilateral   • Sleep apnea     wears CPAP   • Sleep disturbances 5/24/2017   • Squamous cell skin cancer    • Thyroid condition     hypothyroid         Past Surgical History:   Procedure Laterality Date   • Aorta bifemoral angiogram/possible pta/possible stent - cv  07/28/2020   • Appendectomy     • Cardiac catherization     • Cardiac catheterization/possible ptca/possible stent  07/28/2020   • Cardioversion  08/01/2006, 2007   • Cataract extraction w/ intraocular lens  implant, bilateral     • Cholecystectomy     • Colonoscopy  08/01/2018    Repeat in 5 years, Benign Adenomatous Colon Polyps   • Colonoscopy diagnostic  10/04/2012   • Dexa bone  Critical care CODE BLUE note:  Called to see patient urgently due to difficulty with bag ventilations and desaturations. Decision was made to emergently reintubate patient. See separate procedure note. Patient was intubated without difficulty. Tube fogging noted and adequate chest rise after intubation. Patient's pulse was noted to be thready after intubation. Decision was made to start CPR, which was started at that time. 1 mg of epinephrine IV push was given as well. CPR was completed for 2 minutes. Upon check of pulse, ROSC had been achieved. Patient was placed on ventilator. Family was in the waiting room and updated. Support provided. We will continue to follow closely in the ICU. Prognosis is guarded at this time.     Grace ARIZA-ACNP  Critical Care Nurse Practitioner   [facility] density axial skeleton  03/17/2006   • Eye surgery     • Joint replacement     • Knee scope,diagnostic  09/08/2008    Knee Arthroscopy, left and right   • Knee scope,diagnostic Right 01/01/2012    Knee Arthroscopy   • Laminectomy      cervical   • Mammo screening bilateral  04/17/2013   • Medial branch block      lumbar   • Parathyroidectomy     • Radiofrequency ablation  07/24/2019    lumbar nerves   • Salpingoophorectomy      unilateral   • Skin biopsy  07/03/2019   • Skin surgery  01/26/2021    Mohs right medial temple    • Tavr iliofemoral-cv  08/03/2020   • Thyroid lobectomy      right   • Total abdominal hysterectomy     • Total knee replacement Right 06/14/2013   • Total knee replacement Left 01/01/2009       MEDICATIONS:  Current Outpatient Medications   Medication Sig Dispense Refill   • clindamycin (CLEOCIN) 300 MG capsule Take 1 capsule by mouth 3 times daily. 30 capsule 0   • fluorouracil (EFUDEX) 5 % cream Apply topically 2 times daily. Apply to face for 2-4 weeks as long as tolerated, THEN arms to 4-6 weeks use triamcinolone 40 g 1   • triamcinolone (ARISTOCORT) 0.1 % ointment Apply topically 2 times daily as needed (up to 1 week AFTER 5-fluorouracil application). 30 g 3   • gabapentin (NEURONTIN) 300 MG capsule TAKE 1 CAPSULE BY MOUTH FOUR TIMES DAILY 360 capsule 0   • cephalexin (Keflex) 500 MG capsule Take 1 capsule by mouth 3 times daily. 30 capsule 0   • amoxicillin-clavulanate (AUGMENTIN) 875-125 MG per tablet TAKE 1 TABLET BY MOUTH TWICE DAILY FOR 10 DAYS WITH MORNING AND EVENING MEALS     • Levothyroxine Sodium 125 MCG Cap Take 125 mcg by mouth daily. 30 capsule 11   • spironolactone-hydrochlorothiazide (ALDACTAZIDE) 25-25 MG per tablet Take 1 tablet by mouth daily. 90 tablet 3   • dilTIAZem (CARDIZEM CD) 120 MG 24 hr capsule Take 120 mg by mouth daily.      • tiZANidine (ZANAFLEX) 2 MG tablet Take 2 mg by mouth 2 times daily.      • metFORMIN (GLUCOPHAGE-XR) 500 MG 24 hr tablet Take 1 tablet  by mouth daily. 90 tablet 3   • warfarin (COUMADIN) 5 MG tablet Take 1 tablet by mouth daily. Or as directed. 180 tablet 3   • metoPROLOL tartrate (LOPRESSOR) 25 MG tablet TAKE 1 TABLET BY MOUTH EVERY 12 HOURS 180 tablet 3   • ferrous sulfate 324 (65 Fe) MG EC tablet Take 1 tablet by mouth daily (with breakfast). 90 tablet 3   • Iron Combinations (Niferex) Tab Take 150 mg by mouth daily. 90 tablet 3   • promethazine-codeine (PHENERGAN WITH CODEINE) 6.25-10 MG/5ML syrup Take 10 mLs by mouth 4 times daily as needed for Cough. 240 mL 0   • dilTIAZem (Dilt-XR) 180 MG 24 hr capsule TK 1 C PO QD     • metoPROLOL succinate (TOPROL-XL) 25 MG 24 hr tablet Take 25 mg by mouth daily.      • aspirin 81 MG chewable tablet Chew 1 tablet by mouth daily. 90 tablet 1   • warfarin (COUMADIN) 7.5 MG tablet Take 10 mg tonight then 7.5 mg po every night.  Check INR Monday 8/10/2020     • Accu-Chek FastClix Lancets Misc TEST BLOOD SUGAR TWICE DAILY AS DIRECTED BY DOCTOR 102 each 3   • Ascorbic Acid (VITAMIN C) 500 MG tablet Take 500 mg by mouth 2 times daily.     • simvastatin (ZOCOR) 20 MG tablet TAKE 1 TABLET BY MOUTH EVERY NIGHT AT BEDTIME (Patient taking differently: 1/2 tab Mon, Wed and Fri) 30 tablet 3   • blood glucose meter Test blood sugar 2 times daily as directed. 1 kit 0   • blood glucose (ACCU-CHEK COMPACT PLUS) test strip 2 times daily. 200 strip 2   • CALCIUM-VITAMIN D PO Take 1 tablet by mouth daily.     • Zinc Acetate, Oral, 25 MG Cap Take 25 mg by mouth daily.     • traMADol (ULTRAM) 50 MG tablet Take 1 tablet by mouth every 6 hours as needed for Pain. 120 tablet 0   • Cyanocobalamin (VITAMIN B12 PO) Take 2 capsules by mouth daily.     • DISPENSE Please dispense to patient an accu-check compact plus glucometer. Patient test twice daily 1 each 0   • Multiple Vitamins-Minerals (MULTIVITAMIN PO) Take 1 tablet by mouth daily.     • acetaminophen (TYLENOL) 500 MG tablet Take 500 mg by mouth every 6 hours as needed for Pain.  Takes 1 tablet four times daily       No current facility-administered medications for this visit.       ALLERGIES:  Allergies as of 09/20/2021 - Reviewed 09/20/2021   Allergen Reaction Noted   • Kiwi PRURITUS and THROAT SWELLING    • Kiwi   (food or med) SWELLING 03/02/2021   • Esomeprazole Other (See Comments) 02/14/2021   • Nexium DIARRHEA        REVIEW OF SYSTEMS:  Constitutional:  Denies fever or chills, weight gain or loss.    Respiratory:  Denies shortness of breath, cough or wheezing.   Cardiovascular:  Denies chest pain or palpitations, no PND or orthopnea.  Gastrointestinal:  Denies abdominal pain, nausea, vomiting, bloody stools or diarrhea.  Neurologic:  Denies headache, focal weakness or sensory changes.     PHYSICAL EXAMINATION:  Constitutional:  Well-developed, well-nourished, no acute distress, non-toxic appearance.  Respiratory: Lungs are clear. No wheezing, rhonchi or rales.  Cardiovascular:  Normal rate, normal rhythm, no murmurs, no gallops, no rubs.   Extremities:  Continued erythema of left anterior shin surface but no warmth.  Pulses are palpable left foot    ASSESSMENT:   1. Type 2 diabetes mellitus without complication, without long-term current use of insulin (CMS/HCC)    2. Hyperlipidemia associated with type 2 diabetes mellitus (CMS/HCC)    3. Hypertension complicating diabetes (CMS/HCC)    4. Iatrogenic hypothyroidism    5. Valvular heart disease    6. Paroxysmal atrial fibrillation (CMS/HCC)    7. Chronic anticoagulation    8. Need for vaccination    9. Acute right ankle pain        PLAN:  Orders Placed This Encounter   • XR Ankle 3+ View Right   • Influenza Quadrivalent Enhanced High Dose Split Pres Free 0.7 mL Pre-filled Vacc (Fluzone High Dose Quad; ages 65+ yrs) - [id number]     Return in about 3 months (around 12/15/2021).    SUMMARY OF ISSUES:  1. Right lower extremity pain:  symptoms currently involving the right calf and ankle rather than the anterior shin area.  Cellulitis appears  to have resolved.  Patient has had workup in follow-up for vascular surgery fairly recently.  Will obtain x-rays of the right ankle.  t     ONGOING ISSUES:  1. Hypertension: Excellent control on diltiazem 180 mg a day, GFR o 82 on 6/10/2021.  2. DM 2: Patient is currently on metformin 500 mg daily, overall excellent control with a fasting blood sugar of 95 and glycohemoglobin of 5.8 on 6/10/2021  3. Hyperlipidemia: Well controlled on low dose simvastatin 20 mg, half a tablet 3 days a week, LDL of 93 and HDL of 75 on 12/11/2020. It does not appear that previous symptoms of myalgias were related to the statin agent..  4.  Hypothyroidism: Currently on replacement, TSH of 0.065 on 6/10/2021 reflecting over replacement.  Patient is currently taking 0.137 mg daily  5. Sleep apnea:  Patient had recent sleep study and is currently on CPAP  6. Chronic diastolic congestive heart failure : The patient does have of 2/6 diastolic dysfunction by echo June of this year.  BNP has improved from a 1000 down to 568 on 12/11/2020 . Patient is on aldactazide 25/25 daily with good results.  7.  Moderate aortic valve stenosis: TAVR per Dr. Chauhan and Dr. Malik 8/3/2020. Right profunda artery laceration s/p successful surgical repair performed.  8. Asthma ?: Lungs are quite clear, really no evidence of bronchospasm at this time. Patient not really on any controller agents.  Arrangements have been made for pulmonary consult  9. Chronic atrial fibrillation: VR well controlled on diltiazem.  10. Chronic anticoagulation with warfarin: continue with monthly INR. Being followed by me.    11. 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  Recent vascular evaluation shows adequate supply.  Excellent right pedal pulse.  Only area of concern remains the 2nd digit which still appears to have elements of some vascular compromise. Discussed options with patient including possible toe amputation which she  is reluctant to consider at this time. Patient was to increase her gabapentin nighttime dose to 2 tablets  12 Postoperative anemia:  Hemoglobin of 13.7 on 6/10/2021, iron of 42  13. Diffuse myalgias: not resolved following discontinuation of statin agent. Sent to Rheumatology, no evidence of inflammatory/autoimmune issues. Placed on gabapentin 300 mg TID which has been helpful. Most likely secondary to elements of fibromyalgia.  14.  Facial paresthesias: Quite minimal and currently asymptomatic.   15. History of primary hyperparathyroidism. Status post parathyroidectomy and right thyroid lobectomy at [facility] 02/2006  16. Chronic neck and back pain: Patient has a history of having chronic neck and back pain due to degenerative joint disease with spinal stenosis and has had neck surgery. Patient had been followed by Dr. Dobbs from pain clinic, currently being followed by Dr. Galindo. Currently on tramadol 2-4 tablets a day, and flexeril 5 mg 2-3 times daily.   17. Essential Tremor: had been seen by Dr Garcia, patient states he didn't tell her anything I did not and does not want to go back     18. History of benign chest nodule with ne hello gative followup CAT scans.   19. Peptic ulcer disease: not on any agents. Discussed OTC omeprazole if she does develop symptoms in the future        Summary of plan:  1. Patient has an appointment to see me in follow-up on 12/15/2021  2. X-ray of right ankle

## 2023-02-16 NOTE — PROCEDURES
Chest tube insertion  Respiratory failure, large right pleural effusion  Situation reviewed with the family verbal consent obtained for the procedure  ASA score 3  Timeout was performed at 1212    Bedside ultrasound was utilized to evaluate the right chest which revealed a large freely flowing pleural effusion. The appropriate interspace was marked. The area was prepared with chlorhexidine. 5 cc of 1% lidocaine instilled into the soft tissues. Subsequently, needle introduced into the pleural space, fluid withdrawn, guidewire placed, dilation performed and subsequently 18 [place] chest tube was inserted without difficulty. The device was sutured to the chest wall sterile occlusive dressing applied, device attached to lower back. Approximately 1 L of fluid removed following insertion. No untoward events, chest x-ray pending.     Lise Rose  [phone]

## 2023-02-16 NOTE — PROCEDURES
Intubation Procedure Note    Indication: airway compromise and unable to ventilate patient. Consent: Unable to be obtained due to the emergent nature of this procedure. Medications Used: etomidate 10 mg  intravenously and rocuronium 100 mg intravenously    Procedure: The patient was placed in the appropriate position. Cricoid pressure was not required. Intubation was performed by direct laryngoscopy using a laryngoscope and a 7.5 cuffed endotracheal tube. The cuff was then inflated and the tube was secured appropriately at a distance of 24 cm to the dental ridge. Initial confirmation of placement included bilateral breath sounds, an end tidal CO2 detector, absence of sounds over the stomach, and tube fogging. A chest x-ray to verify correct placement of the tube has been ordered but is still pending. The patient tolerated the procedure well.      Complications: none      Adina ARIZA-ACNP  Critical Care Nurse Practitioner   [facility] [place]

## 2023-02-16 NOTE — PROGRESS NOTES
I have personally seen and examined the patient independently. I have reviewed the patient's available data,including medical history and recent test results. Reviewed and discussed note as documented by WAGNER. I agree with the physical exam findings, assessment and plan. My documented complex medical decisions constitute a substantive portion of the supervisory note. 59 minutes    Acute respiratory failure with hypoxia, hypercapnia  Hypervolemia/volume overload, mild pulmonary edema  Acute kidney injury (superimposed on CKD stage III), currently requires dialytic support (transition to intermittent)  Hypervolemia/pulmonary edema due to above  Coronary artery disease  History of chronic diastolic heart failure (suspect acute on chronic)  ICD  History of colon resection for colon cancer  Diabetes mellitus  Severe peripheral vascular disease, purulent wounds noted both lower extremities    Continue ventilatory support, attempt spontaneous breathing trial with transition off propofol to Precedex infusion  Dialysis/volume removal per nephrology service  Antimicrobial therapy for treatment of lower extremity wound infections, adjusted today to cover MRSA, Pseudomonas  Glycemic control  Oral nutritional support  Ulcer, DVT prophylaxis      Complex medical decisions required for today's evaluation and management reviewed in detail during critical care rounds.     Vonnie Pastor  [phone]

## 2023-02-16 NOTE — PROGRESS NOTES
V2.0  [facility] [facility] [place]      Name:  Carlton Negron /Age/Sex: 1950  (67 y.o. male)   MRN & CSN:  [id number] & [id number] Encounter Date/Time: 2023 4:09 PM EST    Location:  -A PCP: Kameron Isaac Day: 6    Assessment and Plan:   Carlton Negron is a 67 y.o. male who presents with Acute respiratory failure with hypoxia and hypercapnia (HCC)    Acute on chronic renal failure  Acute respiratory failure, requiring intubation  Hyperkalemia-resolved  Acute metabolic encephalopathy  Chronic LE wounds   HTN  CAD  Heart failure with preserved ejection fraction     Plan:     Neuro - sedated on propofol, patient reportedly agitated when sedation is lightened, however does not follow commands. EEG obtained-no electrographic seizures/NCSE    CV - acute on chronic HF. EF 50-55% in . Resp - acute hypoxic hypercapneic resp failure. Intubated, SAT trials, sedated on propofol. Checks x-ray ordered this morning pending. Continue DuoNebs, pulmonary hygiene. We will resend sputum culture. GI - Tolerating tube feed. GI prophylaxis      - NANCY on CKD 3/4. Baseline Cr 2.2. adm Cr 4.8. sCr now 2.2; CRRT has been stopped. Nephrology following. Urology consulted for difficult parkinson; Distal urethral stricture with incomplete bladder emptying-cystoscopy/TRUS as outpatient if voiding issues persist, voiding trial prior to discharge     ID -Bilateral lower extremity wounds; wound cultures on -positive for Alcaligenes faecalis, Staph aureus MRSA, Finegoldia magna. Antibiotics changed to cefepime and Zyvox continue. White blood count stable at 5.8 today. Heme -hemoglobin-stable. 7.5 today. MSK - LE wounds bilaterally. Wound care consult. Wet-dry for now with xeroform. Antibiotics as above.   Diet Diet NPO  ADULT TUBE FEEDING; Nasogastric; Peptide Based High Protein; Continuous; 20; Yes; 10; Q 4 hours; 65; 30; Q 4 hours; Protein; daily, via NGT   DVT Prophylaxis [] Lovenox, [x]  Heparin, [] SCDs, [] Ambulation,  [] Eliquis, [] Xarelto  [] Coumadin   Code Status Full Code   Disposition From: Home  Expected Disposition: TBD  Estimated Date of Discharge: TBD  Patient requires continued admission due to respiratory failure   Surrogate Decision Maker/ POA Child     Subjective:      Patient seen and examined this morning. Currently remains critically ill on the ventilator. He is currently being sedated on propofol. He is unresponsive upon my assessment. He does not follow commands or open his eyes. There is no family at the bedside. Review of systems impossible this time given mental status. Plan of care discussed with bedside RN. Review of Systems:    Review of Systems    Unable to complete secondary to intubation    Objective: Intake/Output Summary (Last 24 hours) at 2/16/2023 1226  Last data filed at 2/16/2023 1042  Gross per 24 hour   Intake 2472.87 ml   Output 1564 ml   Net 908.87 ml          Vitals:   Vitals:    02/16/23 1100   BP: (!) 108/50   Pulse: 60   Resp: 16   Temp:    SpO2:        Physical Exam:     General: Ill-appearing male, NAD  Eyes: EOMI  ENT: neck supple, ETT, NGT  Cardiovascular: Regular rate. Respiratory: Scattered rhonchi, no wheezing. On ventilator. Gastrointestinal: Obese, soft, non tender  Genitourinary: no suprapubic tenderness, Fuller catheter in place.   Musculoskeletal: 2+ lower extremity edema, chronic wounds on bilateral lower extremities- dressing clean/dry/intact  Skin: warm, dry  Neuro: Currently ventilator, sedated, not following commands, cranial nerves grossly intact  psych: Unable to assess    Medications:   Medications:    cefepime  1,000 mg IntraVENous Q12H    linezolid  600 mg IntraVENous Q12H    famotidine  20 mg Per NG tube Daily    heparin (porcine)  5,000 Units SubCUTAneous 3 times per day    sodium hypochlorite   Irrigation Daily    collagenase   Topical Daily    sodium chloride flush  5-40 mL IntraVENous 2 times per day    aspirin  81 mg Oral Daily    [Held by provider] clopidogrel  75 mg Oral Daily    ipratropium-albuterol  1 ampule Inhalation Q4H WA    chlorhexidine  15 mL Mouth/Throat BID      Infusions:    dextrose      sodium chloride      propofol Stopped (02/16/23 1054)     PRN Meds: heparin flush, 240 Units, PRN  fentanNYL, 50 mcg, Q1H PRN  midazolam, 1 mg, Q2H PRN  glucose, 4 tablet, PRN  dextrose bolus, 125 mL, PRN   Or  dextrose bolus, 250 mL, PRN  glucagon (rDNA), 1 mg, PRN  dextrose, , Continuous PRN  sodium chloride flush, 10 mL, PRN  sodium chloride, , PRN      Labs      Recent Results (from the past 24 hour(s))   Calcium, Ionized    Collection Time: 02/15/23  1:35 PM   Result Value Ref Range    Ionized Ca 1.17 1.12 - 1.32 mMOL/L    Calcium, Ionized 4.68 4.48 - 5.28 MG/DL   Comprehensive Metabolic Panel w/ Reflex to MG    Collection Time: 02/15/23  3:30 PM   Result Value Ref Range    Sodium 134 (L) 135 - 145 MMOL/L    Potassium 4.1 3.5 - 5.1 MMOL/L    Chloride 102 99 - 110 mMol/L    CO2 26 21 - 32 MMOL/L    BUN 14 6 - 23 MG/DL    Creatinine 1.8 (H) 0.9 - 1.3 MG/DL    Est, Glom Filt Rate 39 (L) >60 mL/min/1.73m2    Glucose 166 (H) 70 - 99 MG/DL    Calcium 8.4 8.3 - 10.6 MG/DL    Albumin 2.5 (L) 3.4 - 5.0 GM/DL    Total Protein 5.8 (L) 6.4 - 8.2 GM/DL    Total Bilirubin 0.9 0.0 - 1.0 MG/DL    ALT 8 (L) 10 - 40 U/L    AST 12 (L) 15 - 37 IU/L    Alkaline Phosphatase 84 40 - 128 IU/L    Anion Gap 6 4 - 16   Phosphorus    Collection Time: 02/15/23  3:30 PM   Result Value Ref Range    Phosphorus 2.6 2.5 - 4.9 MG/DL   Magnesium    Collection Time: 02/15/23  3:30 PM   Result Value Ref Range    Magnesium 1.9 1.8 - 2.4 mg/dl   POCT Glucose    Collection Time: 02/15/23  7:39 PM   Result Value Ref Range    POC Glucose 191 (H) 70 - 99 MG/DL   POCT Glucose    Collection Time: 02/15/23 11:04 PM   Result Value Ref Range    POC Glucose 252 (H) 70 - 99 MG/DL   POCT Glucose    Collection Time: 02/16/23  3:10 AM   Result Value Ref Range    POC Glucose 198 (H) 70 - 99 MG/DL   CBC with Auto Differential    Collection Time: 02/16/23  4:25 AM   Result Value Ref Range    WBC 5.8 4.0 - 10.5 K/CU MM    RBC 2.92 (L) 4.6 - 6.2 M/CU MM    Hemoglobin 7.5 (L) 13.5 - 18.0 GM/DL    Hematocrit 25.3 (L) 42 - 52 %    MCV 86.6 78 - 100 FL    MCH 25.7 (L) 27 - 31 PG    MCHC 29.6 (L) 32.0 - 36.0 %    RDW 18.6 (H) 11.7 - 14.9 %    Platelets 955 (L) 706 - 440 K/CU MM    MPV 11.1 7.5 - 11.1 FL    Differential Type AUTOMATED DIFFERENTIAL     Segs Relative 71.5 (H) 36 - 66 %    Lymphocytes % 15.8 (L) 24 - 44 %    Monocytes % 10.5 (H) 0 - 4 %    Eosinophils % 1.4 0 - 3 %    Basophils % 0.5 0 - 1 %    Segs Absolute 4.2 K/CU MM    Lymphocytes Absolute 0.9 K/CU MM    Monocytes Absolute 0.6 K/CU MM    Eosinophils Absolute 0.1 K/CU MM    Basophils Absolute 0.0 K/CU MM    Nucleated RBC % 0.5 %    Total Nucleated RBC 0.0 K/CU MM    Total Immature Neutrophil 0.02 K/CU MM    Immature Neutrophil % 0.3 0 - 0.43 %   Calcium, Ionized    Collection Time: 02/16/23  4:25 AM   Result Value Ref Range    Ionized Ca 1.14 1.12 - 1.32 mMOL/L    Calcium, Ionized 4.56 4.48 - 5.28 MG/DL   Comprehensive Metabolic Panel w/ Reflex to MG    Collection Time: 02/16/23  4:25 AM   Result Value Ref Range    Sodium 136 135 - 145 MMOL/L    Potassium 4.0 3.5 - 5.1 MMOL/L    Chloride 102 99 - 110 mMol/L    CO2 26 21 - 32 MMOL/L    BUN 18 6 - 23 MG/DL    Creatinine 2.2 (H) 0.9 - 1.3 MG/DL    Est, Glom Filt Rate 31 (L) >60 mL/min/1.73m2    Glucose 179 (H) 70 - 99 MG/DL    Calcium 7.9 (L) 8.3 - 10.6 MG/DL    Albumin 2.4 (L) 3.4 - 5.0 GM/DL    Total Protein 5.6 (L) 6.4 - 8.2 GM/DL    Total Bilirubin 0.8 0.0 - 1.0 MG/DL    ALT 7 (L) 10 - 40 U/L    AST 12 (L) 15 - 37 IU/L    Alkaline Phosphatase 82 40 - 128 IU/L    Anion Gap 8 4 - 16   Magnesium    Collection Time: 02/16/23  4:25 AM   Result Value Ref Range    Magnesium 2.0 1.8 - 2.4 mg/dl   Phosphorus    Collection Time: 02/16/23  4:25 AM   Result Value Ref Range    Phosphorus 2.8 2.5 - 4.9 MG/DL   POCT Glucose    Collection Time: 02/16/23  6:58 AM   Result Value Ref Range    POC Glucose 182 (H) 70 - 99 MG/DL   POCT Glucose    Collection Time: 02/16/23 10:46 AM   Result Value Ref Range    POC Glucose 193 (H) 70 - 99 MG/DL        Imaging/Diagnostics Last 24 Hours   XR CHEST PORTABLE    Result Date: 2/11/2023  EXAMINATION: ONE XRAY VIEW OF THE CHEST 2/11/2023 7:29 pm COMPARISON: Chest radiograph 02/11/2023 HISTORY: ORDERING SYSTEM PROVIDED HISTORY: ETT placement TECHNOLOGIST PROVIDED HISTORY: Reason for exam:->ETT placement Reason for Exam: et and og tube placement confirmation Additional signs and symptoms: et and og tube placement confrimation FINDINGS: Lines and tubes: -ETT terminates at the superior margin of the clavicles. -enteric tube takes a the subdiaphragmatic course and terminates out of the field of view -right-sided Cordis catheter, which terminates within the mid/upper SVC Lungs: There is indistinctness of the pulmonary vasculature with patchy opacities within the right greater than left lungs. . Pleura: Small right-sided pleural effusion. Cardiomediastinal silhouette: Enlarged cardiac silhouette. Bones: No acute osseous findings. Soft tissues: Left-sided 2 lead cardiac device. Lines and tubes as above. The ETT terminates at the level of the superior margin of the clavicles. Grossly unchanged pulmonary exam.  Findings favor cardiogenic pulmonary edema. However a superimposed infectious process cannot be excluded. I personally saw the patient and independently provided 38 minutes of non-concurrent critical care out of the total shared critical care time provided.     Electronically signed by MICKY Degroot CNP on 2/16/2023 at 12:26 PM

## 2023-02-16 NOTE — PROGRESS NOTES
CARDIOLOGY  NOTE        Name:  Dusty Santos /Age/Sex: 1950  (67 y.o. male)   MRN & CSN:  [id number] & [id number] Admission Date/Time: 2023 10:42 AM   Location:  -[facility] PCP: Yasmine Daniel Day: 6        PLAN FROM CARDIOLOGY FOR TODAY: Possible extubation today    - cardiology consult is for: Elevated troponin    -  Interval history: Possible extubation today    ASSESSMENT/ PLAN:      - cad patient history of known CAD and stents in all 3 arteries and had cardiac cath done in 2018 which showed the stents are patent  Last Cardiolite done in 2020 showed the EF was 48% showed inferior wall MI no ischemia was seen  Patient's troponin is elevated there probably secondary to type II elevation given that the patient creatinine is for now   - acute kidney injury per renal we will try furosemide stress test per renal and plan as the progress  -hfpef aggressive diuresis and monitor    -icd last ICD check was on 2023 which showed that the OptiVol was normal   -pvd had multiple interventions done last 1 was done in October of last year   Respiratory failure remains on vent  Severe pulmonary hypertension          Subjective: Todays complain: Patient on vent    HPI:  Nicolás iBngham is a 67 y. o.year old who and presents with had concerns including Respiratory Distress. Chief Complaint   Patient presents with    Respiratory Distress           Objective: Temperature:  Current - Temp: 97.7 °F (36.5 °C);  Max - Temp  Av.7 °F (36.5 °C)  Min: 96.6 °F (35.9 °C)  Max: 99.4 °F (37.4 °C)    Respiratory Rate : Resp  Av.4  Min: 15  Max: 20    Pulse Range: Pulse  Av.7  Min: 60  Max: 73    Blood Presuure Range:  Systolic (11JLQ), OUB:338 , Min:91 , BIJ:547   ; Diastolic (69NGY), MSX:64, Min:44, Max:103      Pulse ox Range: SpO2  Av.8 %  Min: 92 %  Max: 100 %    24hr I & O:    Intake/Output Summary (Last 24 hours) at 2/16/2023 1129  Last data filed at 2/16/2023 1042  Gross per 24 hour   Intake 2472.87 ml   Output 1889 ml   Net 583.87 ml         BP (!) 111/55   Pulse 60   Temp 97.7 °F (36.5 °C) (Rectal)   Resp 16   Ht 6' (1.829 m)   Wt 287 lb 4.2 oz (130.3 kg)   SpO2 100%   BMI 38.96 kg/m²           Review of Systems: On vent    TELEMETRY: Atrial paced rhythm   has a past medical history of Acid reflux, Acute MI ([address] Arthritis, Broken teeth, CAD (coronary artery disease), Cardiomyopathy ([address] Cerebral artery occlusion with cerebral infarction ([address] CHF (congestive heart failure) ([address] Chronic back pain, Chronic kidney disease, Diabetes mellitus ([address] Diabetic neuropathy ([address] H/O cardiovascular stress test, H/O Doppler ultrasound, H/O percutaneous left heart catheterization, History of irregular heartbeat, History of syncope, Hyperlipidemia, Hypertension, Leg swelling, Necrotic toes (HCC), Neuropathy, PAD (peripheral artery disease) ([address] PVD (peripheral vascular disease) ([address] Sick sinus syndrome ([address] Sleep apnea, Spinal stenosis, Teeth missing, Type 2 diabetes mellitus without complication ([address] and WD-Chronic foot ulcer, left, with necrosis of bone ([address] has a past surgical history that includes Coronary angioplasty with stent; Dental surgery; Colonoscopy (08/04/2016); pacemaker placement (06/04/2010); vascular surgery; colectomy (Right, 08/26/2016); Toe amputation (Right, 09/12/2017); Toe amputation (Right, 01/09/2018); Cardiac catheterization; Cardiac defibrillator placement (06/04/2010); Coronary angioplasty; Cardiac catheterization (07/14/2017); Cardiac catheterization (11/20/2018); Toe amputation (Left, 12/26/2020); IR TUNNELED CVC PLACE WO SQ PORT/PUMP > 5 YEARS (6/14/2021); Toe amputation (Left, 7/26/2022); Toe amputation (Right, 10/20/2022); and IR TUNNELED CVC PLACE WO SQ PORT/PUMP > 5 YEARS (11/17/2022).   Physical Exam:  General: On vent  Head:normal  Eye: Pupils equal and round  Neck:  No JVD, no carotid bruit noted   Chest:  Clear to auscultation, no signs of respiratory distress  Cardiovascular:  Normal rate and rhythm. S1 and S2 noted. No murmurs rubs or gallops  Abdomen:   nontender  Extremities:  tr edema  Pulses; palpable  Neuro: On vent2    Medications:    cefepime  1,000 mg IntraVENous Q12H    linezolid  600 mg IntraVENous Q12H    famotidine  20 mg Per NG tube Daily    heparin (porcine)  5,000 Units SubCUTAneous 3 times per day    sodium hypochlorite   Irrigation Daily    collagenase   Topical Daily    sodium chloride flush  5-40 mL IntraVENous 2 times per day    aspirin  81 mg Oral Daily    [Held by provider] clopidogrel  75 mg Oral Daily    ipratropium-albuterol  1 ampule Inhalation Q4H WA    chlorhexidine  15 mL Mouth/Throat BID      dextrose      sodium chloride      propofol Stopped (02/16/23 1054)     heparin flush, fentanNYL, midazolam, glucose, dextrose bolus **OR** dextrose bolus, glucagon (rDNA), dextrose, sodium chloride flush, sodium chloride    Lab Data:  CBC:   Recent Labs     02/14/23  0700 02/15/23  0557 02/16/23  0425   WBC 4.2 4.4 5.8   HGB 7.7* 7.1* 7.5*   HCT 25.8* 23.5* 25.3*   MCV 85.7 85.8 86.6   * 92* 115*     BMP:   Recent Labs     02/15/23  0557 02/15/23  1530 02/16/23  0425    134* 136   K 3.7 4.1 4.0    102 102   CO2 27 26 26   PHOS 2.7 2.6 2.8   BUN 16 14 18   CREATININE 1.9* 1.8* 2.2*     LIVER PROFILE:   Recent Labs     02/15/23  0557 02/15/23  1530 02/16/23  0425   AST 11* 12* 12*   ALT 7* 8* 7*   BILITOT 0.7 0.9 0.8   ALKPHOS 68 84 82     PT/INR: No results for input(s): PROTIME, INR in the last 72 hours. APTT: No results for input(s): APTT in the last 72 hours. BNP:  No results for input(s): BNP in the last 72 hours. TROPONIN: No results for input(s): TROPONINI in the last 72 hours. No results for input(s): TROPONINT in the last 72 hours.        Labs, consult, tests reviewed                    Lamberto Ovalle MD, PA-C 2/16/2023 11:29 AM Please note this report has been partially produced using speech recognition software and may contain errors related to that system including errors in grammar, punctuation, and spelling, as well as words and phrases that may be inappropriate. If there are any questions or concerns please feel free to contact the dictating provider for clarification.

## 2023-02-16 NOTE — PROGRESS NOTES
Pt SpO2 noted to be dropping with change in cardiac rhythm by primary RN, Naseem Navarro. Roxy RT also at bedside to assist with ventilation. Carlos Eduardo Kay  and Dr. Mimi Hercules called to bedside to assess pt and airway. Pt SpO2 in fifties with audible girgling despite RT attempt to reinflate cuff and maintain patency of vent circuit.     [address] replaced pt ET. Etomidate and rocuronium used for paralytic. See code documentation. Code not called overhead as all providers already at bedside. ET size 7.5 and 25 at the lip    1202 Pt in PEA. Order to give 1mg epinephrine by . ROSC achieved after one round of chest compressions. Pt in sinus tachycardia     1215 Dr. Mimi Hercules inserting chest tube at bedside. See portable Xray orders to confirm ET placement. Pt SpO2 at 100% and back in NSR at this time. Family stepped out of room when pt became unstable and were given an updated shortly after by [employer].

## 2023-02-16 NOTE — PROGRESS NOTES
V2.0  [facility] Critical Care Progress Note      Name:  Lolita Kessler /Age/Sex: 1950  (67 y.o. male)   MRN & CSN:  [id number] & [id number] Encounter Date/Time: 2/15/2023 9:52 PM EST    Location:  -A PCP: Kameron Isaac Day: 5    Assessment and Plan:     Assessment and Plan:   Lolita Kessler is a 67 y.o. male with a pmh of CAD, HFpEF, ICD, CKD, DM who presents with Acute respiratory failure with hypoxia and hypercapnia [facility]     Hospital Problems               Last Modified POA     * (Principal) Acute respiratory failure with hypoxia and hypercapnia ([facility] [address] 2023 Yes     Acute kidney injury superimposed on CKD ([facility] [address] 2023 Yes   Hyperkalemia  Metabolic encephalopathy  Chronic LE wounds   HTN     Plan     Neuro - intubated, sedated     CV - acute on chronic HF. EF 50-55% in . On low dose pressors  ICD interrogated in       Resp - acute hypoxic hypercapneic resp failure. On high bipap settings 20/5, FiO2 40%. Intubated . Titrate doen FiO2      GI - npo for now. TF. GI prophylaxis      - NANCY on CKD 3/4. Baseline Cr 2.2. adm Cr 4.8. Hyperkalemia. Started on CRRT. Did not tolerated HD yesterday  Urology - for difficult parkinson. Stricture per Urology. Plavix held for possible dilation by Urology     ID - will cover for CAP with CTX and azithro     Heme - HSQ     MSK - LE wounds bilaterally. Wound care consult. Wet-dry for now with xeroform. Diet Diet NPO  ADULT TUBE FEEDING; Nasogastric; Peptide Based High Protein; Continuous; 20; Yes; 10; Q 4 hours; 65; 30; Q 4 hours; Protein; daily, via NGT   DVT Prophylaxis [] Lovenox, [x]  Heparin, [] SCDs, [] Ambulation,  [] Eliquis, [] Xarelto  [] Coumadin   GI Prophylaxis [x] Yes          [] No   Code Status Full Code    Disposition Patient requires continued ICU care due to vent support and CRRT     Subjective: Intubated yesterday as ABG and mentation was not improving despite high bipap settings .  Hyperkalemia despite HD session yesterday so started on CRRT per Nephro recs         Review of Systems:    Review of Systems    Unable to assess    Objective: Intake/Output Summary (Last 24 hours) at 2/15/2023 2152  Last data filed at 2/15/2023 1829  Gross per 24 hour   Intake 2199.52 ml   Output 5042 ml   Net -2842.48 ml        Vitals:   Vitals:    02/15/23 2100   BP: (!) 143/67   Pulse: 72   Resp: 20   Temp:    SpO2:        Physical Exam:     General: sedated  Eyes: EOMI  ENT: neck supple  Cardiovascular: Regular rate. Respiratory: Clear to auscultation  Gastrointestinal: Soft, non tender  Genitourinary: no suprapubic tenderness  Musculoskeletal: ++ edema till thighs. Chronic wound on bilateral LE  Skin: warm, dry  Neuro: sedated.       Medications:   Medications:    [START ON 2/16/2023] famotidine  20 mg Per NG tube Daily    clindamycin (CLEOCIN) IV  600 mg IntraVENous Q8H    heparin (porcine)  5,000 Units SubCUTAneous 3 times per day    sodium hypochlorite   Irrigation Daily    collagenase   Topical Daily    sodium chloride flush  5-40 mL IntraVENous 2 times per day    aspirin  81 mg Oral Daily    [Held by provider] clopidogrel  75 mg Oral Daily    ipratropium-albuterol  1 ampule Inhalation Q4H WA    chlorhexidine  15 mL Mouth/Throat BID      Infusions:    dextrose 30 mL/hr at 02/15/23 1829    dextrose      sodium chloride      propofol 20 mcg/kg/min (02/15/23 2134)     PRN Meds: heparin flush, 240 Units, PRN  fentanNYL, 50 mcg, Q1H PRN  midazolam, 1 mg, Q2H PRN  glucose, 4 tablet, PRN  dextrose bolus, 125 mL, PRN   Or  dextrose bolus, 250 mL, PRN  glucagon (rDNA), 1 mg, PRN  dextrose, , Continuous PRN  sodium chloride flush, 10 mL, PRN  sodium chloride, , PRN        Labs      Recent Results (from the past 24 hour(s))   Comprehensive Metabolic Panel w/ Reflex to MG    Collection Time: 02/14/23 11:31 PM   Result Value Ref Range    Sodium 134 (L) 135 - 145 MMOL/L    Potassium 3.9 3.5 - 5.1 MMOL/L    Chloride 104 99 - 110 mMol/L    CO2 26 21 - 32 MMOL/L    BUN 16 6 - 23 MG/DL    Creatinine 1.7 (H) 0.9 - 1.3 MG/DL    Est, Glom Filt Rate 42 (L) >60 mL/min/1.73m2    Glucose 128 (H) 70 - 99 MG/DL    Calcium 7.7 (L) 8.3 - 10.6 MG/DL    Albumin 2.3 (L) 3.4 - 5.0 GM/DL    Total Protein 5.3 (L) 6.4 - 8.2 GM/DL    Total Bilirubin 0.8 0.0 - 1.0 MG/DL    ALT 7 (L) 10 - 40 U/L    AST 15 15 - 37 IU/L    Alkaline Phosphatase 69 40 - 129 IU/L    Anion Gap 4 4 - 16   Phosphorus    Collection Time: 02/14/23 11:31 PM   Result Value Ref Range    Phosphorus 2.8 2.5 - 4.9 MG/DL   Magnesium    Collection Time: 02/14/23 11:31 PM   Result Value Ref Range    Magnesium 1.8 1.8 - 2.4 mg/dl   POCT Glucose    Collection Time: 02/15/23 12:40 AM   Result Value Ref Range    POC Glucose 127 (H) 70 - 99 MG/DL   POCT Glucose    Collection Time: 02/15/23  5:56 AM   Result Value Ref Range    POC Glucose 136 (H) 70 - 99 MG/DL   Calcium, Ionized    Collection Time: 02/15/23  5:57 AM   Result Value Ref Range    Ionized Ca 1.18 1.12 - 1.32 mMOL/L    Calcium, Ionized 4.72 4.48 - 5.28 MG/DL   CBC with Auto Differential    Collection Time: 02/15/23  5:57 AM   Result Value Ref Range    WBC 4.4 4.0 - 10.5 K/CU MM    RBC 2.74 (L) 4.6 - 6.2 M/CU MM    Hemoglobin 7.1 (L) 13.5 - 18.0 GM/DL    Hematocrit 23.5 (L) 42 - 52 %    MCV 85.8 78 - 100 FL    MCH 25.9 (L) 27 - 31 PG    MCHC 30.2 (L) 32.0 - 36.0 %    RDW 18.3 (H) 11.7 - 14.9 %    Platelets 92 (L) 349 - 440 K/CU MM    MPV 10.5 7.5 - 11.1 FL    Differential Type AUTOMATED DIFFERENTIAL     Segs Relative 68.2 (H) 36 - 66 %    Lymphocytes % 19.3 (L) 24 - 44 %    Monocytes % 9.5 (H) 0 - 4 %    Eosinophils % 2.3 0 - 3 %    Basophils % 0.5 0 - 1 %    Segs Absolute 3.0 K/CU MM    Lymphocytes Absolute 0.9 K/CU MM    Monocytes Absolute 0.4 K/CU MM    Eosinophils Absolute 0.1 K/CU MM    Basophils Absolute 0.0 K/CU MM    Nucleated RBC % 1.1 %    Total Nucleated RBC 0.1 K/CU MM    Total Immature Neutrophil 0.01 K/CU MM    Immature Neutrophil % 0.2 0 - 0.43 %   Comprehensive Metabolic Panel w/ Reflex to MG    Collection Time: 02/15/23  5:57 AM   Result Value Ref Range    Sodium 136 135 - 145 MMOL/L    Potassium 3.7 3.5 - 5.1 MMOL/L    Chloride 104 99 - 110 mMol/L    CO2 27 21 - 32 MMOL/L    BUN 16 6 - 23 MG/DL    Creatinine 1.9 (H) 0.9 - 1.3 MG/DL    Est, Glom Filt Rate 37 (L) >60 mL/min/1.73m2    Glucose 131 (H) 70 - 99 MG/DL    Calcium 7.6 (L) 8.3 - 10.6 MG/DL    Albumin 2.2 (L) 3.4 - 5.0 GM/DL    Total Protein 5.0 (L) 6.4 - 8.2 GM/DL    Total Bilirubin 0.7 0.0 - 1.0 MG/DL    ALT 7 (L) 10 - 40 U/L    AST 11 (L) 15 - 37 IU/L    Alkaline Phosphatase 68 40 - 128 IU/L    Anion Gap 5 4 - 16   Phosphorus    Collection Time: 02/15/23  5:57 AM   Result Value Ref Range    Phosphorus 2.7 2.5 - 4.9 MG/DL   Magnesium    Collection Time: 02/15/23  5:57 AM   Result Value Ref Range    Magnesium 1.8 1.8 - 2.4 mg/dl   POCT Glucose    Collection Time: 02/15/23 12:11 PM   Result Value Ref Range    POC Glucose 164 (H) 70 - 99 MG/DL   Calcium, Ionized    Collection Time: 02/15/23  1:35 PM   Result Value Ref Range    Ionized Ca 1.17 1.12 - 1.32 mMOL/L    Calcium, Ionized 4.68 4.48 - 5.28 MG/DL   Comprehensive Metabolic Panel w/ Reflex to MG    Collection Time: 02/15/23  3:30 PM   Result Value Ref Range    Sodium 134 (L) 135 - 145 MMOL/L    Potassium 4.1 3.5 - 5.1 MMOL/L    Chloride 102 99 - 110 mMol/L    CO2 26 21 - 32 MMOL/L    BUN 14 6 - 23 MG/DL    Creatinine 1.8 (H) 0.9 - 1.3 MG/DL    Est, Glom Filt Rate 39 (L) >60 mL/min/1.73m2    Glucose 166 (H) 70 - 99 MG/DL    Calcium 8.4 8.3 - 10.6 MG/DL    Albumin 2.5 (L) 3.4 - 5.0 GM/DL    Total Protein 5.8 (L) 6.4 - 8.2 GM/DL    Total Bilirubin 0.9 0.0 - 1.0 MG/DL    ALT 8 (L) 10 - 40 U/L    AST 12 (L) 15 - 37 IU/L    Alkaline Phosphatase 84 40 - 128 IU/L    Anion Gap 6 4 - 16   Phosphorus    Collection Time: 02/15/23  3:30 PM   Result Value Ref Range    Phosphorus 2.6 2.5 - 4.9 MG/DL   Magnesium    Collection Time: 02/15/23 3:30 PM   Result Value Ref Range    Magnesium 1.9 1.8 - 2.4 mg/dl   POCT Glucose    Collection Time: 02/15/23  7:39 PM   Result Value Ref Range    POC Glucose 191 (H) 70 - 99 MG/DL        Imaging/Diagnostics Last 24 Hours   CT HEAD WO CONTRAST    Result Date: 2/13/2023  EXAMINATION: CT OF THE HEAD WITHOUT CONTRAST  2/13/2023 10:13 pm TECHNIQUE: CT of the head was performed without the administration of intravenous contrast. Automated exposure control, iterative reconstruction, and/or weight based adjustment of the mA/kV was utilized to reduce the radiation dose to as low as reasonably achievable. COMPARISON: Head CT 06/17/2021 HISTORY: ORDERING SYSTEM PROVIDED HISTORY: AMS FINDINGS: BRAIN/VENTRICLES:  No evidence of an acute infarct or other acute parenchymal process. No evidence of acute intracranial hemorrhage. There is no evidence of an intracranial mass or extraaxial fluid collection. No significant mass effect or midline shift. There is mild generalized volume loss. Ventricular enlargement concordant with the degree of parenchymal volume loss. Scattered patchy foci of low attenuation are present within supratentorial white matter which is a nonspecific finding but likely represents mild chronic microvascular ischemia. Small remote cortical infarct involving the left cerebellar hemisphere. ORBITS: The visualized portion of the orbits demonstrate no acute abnormality. Status post left lens extraction. SINUSES:  The visualized paranasal sinuses and mastoid air cells demonstrate no acute abnormality. Enteric tube transverses the left nasal cavity. SOFT TISSUES/SKULL: No acute abnormality of the visualized skull or soft tissues. No acute intracranial abnormality.      CT CHEST ABDOMEN PELVIS WO CONTRAST Additional Contrast? None    Result Date: 2/14/2023  EXAMINATION: CT OF THE CHEST, ABDOMEN, AND PELVIS WITHOUT CONTRAST 2/13/2023 10:16 pm TECHNIQUE: CT of the chest, abdomen and pelvis was performed without the administration of intravenous contrast. Multiplanar reformatted images are provided for review. Automated exposure control, iterative reconstruction, and/or weight based adjustment of the mA/kV was utilized to reduce the radiation dose to as low as reasonably achievable. COMPARISON: 1/22/2022 HISTORY: ORDERING SYSTEM PROVIDED HISTORY: septic shock TECHNOLOGIST PROVIDED HISTORY: Reason for exam:->septic shock Additional Contrast?->None Reason for Exam: septic shock Additional signs and symptoms: no Relevant Medical/Surgical History: none FINDINGS: Chest: Mediastinum: Mild cardiomegaly. No pericardial effusion. Left chest cardiac device. No thoracic adenopathy. Lungs/pleura: Moderate right pleural effusion. Severe right middle lobe and right lower lobe atelectasis. Moderate dependent right upper lobe atelectasis. Small left pleural effusion with moderate left lower lobe atelectasis. Mild dependent left upper lobe atelectasis. Endotracheal tube terminates 4.5 cm above the chayo. No central endobronchial lesion. Soft Tissues/Bones: Right internal jugular catheter with tip in the brachiocephalic vein. No acute osseous abnormality. Abdomen/Pelvis: Organs: Multiple gallstones. The unenhanced liver, spleen, pancreas, gallbladder, adrenal glands, and kidneys demonstrate no acute abnormality. GI/Bowel: No acutely dilated or inflamed loops of bowel. Pelvis: Fuller catheter in the bladder. Moderate amount of pelvic ascites. No pelvic mass, adenopathy, or fluid collection. Peritoneum/Retroperitoneum: Moderate amount of ascites predominantly in the upper abdomen. No abdominal aortic aneurysm. No adenopathy. No free intraperitoneal air. Bones/Soft Tissues:  No acute abnormality of the visualized osseous structures. 1. Moderate right pleural effusion with severe atelectasis in the right lung. 2. Small left pleural effusion with moderate left lower lobe atelectasis.  3. Moderate amount of abdominal and pelvic ascites. 4. Otherwise no acute abnormality in the abdomen or pelvis. Incidental cholelithiasis.        Time spent 40minutes    Electronically signed by Razia Benítez MD on 2/15/2023 at 9:52 PM

## 2023-02-16 NOTE — CARE COORDINATION
CM reviewed notes. Pt had a large R pleural effusion. Chest tube insertion with 1000 cc removed. Pt remains intubated/ vent.  [address]

## 2023-02-16 NOTE — PROGRESS NOTES
Nephrology Progress Note  2/16/2023 7:00 AM        Subjective:   Admit Date: 2/11/2023  PCP: Pina Veronica History: Remains on mechanical ventilation  Continuous dialysis were stopped yesterday    Diet: Tube feed per nasogastric tube    ROS: Remains on assist control mode, FiO2 40% PEEP of 5  Sedated with propofol  Oliguric, no fever, no vasopressor yet    Data:     Current meds:    famotidine  20 mg Per NG tube Daily    clindamycin (CLEOCIN) IV  600 mg IntraVENous Q8H    heparin (porcine)  5,000 Units SubCUTAneous 3 times per day    sodium hypochlorite   Irrigation Daily    collagenase   Topical Daily    sodium chloride flush  5-40 mL IntraVENous 2 times per day    aspirin  81 mg Oral Daily    [Held by provider] clopidogrel  75 mg Oral Daily    ipratropium-albuterol  1 ampule Inhalation Q4H WA    chlorhexidine  15 mL Mouth/Throat BID      dextrose      sodium chloride      propofol 25 mcg/kg/min (02/16/23 0617)         I/O last 3 completed shifts: In: 5911 [I.V.:1752. 1; NG/GT:1309; IV Piggyback:161]  Out: 8850 [Urine:124; Emesis/NG output:60]    CBC:   Recent Labs     02/14/23  0700 02/15/23  0557 02/16/23  0425   WBC 4.2 4.4 5.8   HGB 7.7* 7.1* 7.5*   * 92* 115*          Recent Labs     02/15/23  0557 02/15/23  1530 02/16/23  0425    134* 136   K 3.7 4.1 4.0    102 102   CO2 27 26 26   BUN 16 14 18   CREATININE 1.9* 1.8* 2.2*   GLUCOSE 131* 166* 179*       Lab Results   Component Value Date    CALCIUM 7.9 (L) 02/16/2023    PHOS 2.8 02/16/2023       Objective:     Vitals: BP (!) 96/51   Pulse 60   Temp 97.9 °F (36.6 °C) (Rectal)   Resp 16   Ht 6' (1.829 m)   Wt 287 lb 4.2 oz (130.3 kg)   SpO2 95%   BMI 38.96 kg/m² ,    General appearance: Intubated, sedated, does not respond to noxious stimuli  HEENT: Positive conjunctival pallor  Neck: Right IJ temporary dialysis catheter  Lungs: Limited anterior exam no gross crackles  Heart: Seemed regular rate and rhythm, left chest wall implanted cardiac device  Abdomen: Soft, nontender  Extremities: Bilateral thigh edema and leg edema and chronic wound  He does have a Fuller      Problem List :         Impression :     Stage III acute kidney injury on top of CKD stage IV A3-no sign of kidney recovery yet, likely has toxic and ischemic CVA tubular injury  Septic shock were suspected-skin soft tissue and lung were thought to be the source-no clear-cut organism yet-Pseudomonas from the wound  Not be true infection  Atherosclerotic cardiovascular disease, diabetes, and sequela of several chronic disease and acute illness    Recommendation/Plan  :     I will sit tight today and see how he does-once his blood pressure little better I will try to \"furosemide stress test\"-and plan for as needed extracorporal therapy-if he deemed beneficial for him-if he does not tolerate intermittent dialysis-entered consider continuous-look for the precise source of infection/inflammation and appropriate treatment-watch for iatrogenic nosocomial complication and follow clinically and good glycemic control      Amarjit Duarte MD MD

## 2023-02-16 NOTE — PROGRESS NOTES
V2.0  [facility] Hospitalist Progress Note      Name:  Magno Ro /Age/Sex: 1950  (67 y.o. male)   MRN & CSN:  [id number] & [id number] Encounter Date/Time: 2023 6:51 PM EST    Location:  -A PCP: Ivette Zambrano Day: 6    Assessment and Plan:   Magno Ro is a 67 y.o. male with pmh of  CAD, HFpEF, ICD, CKD, DM who presents with Acute respiratory failure with hypoxia and hypercapnia ([facility] [address]    Plan:  Acute hypoxic Hypercapneic respiratory failure: was initially placed on BIPAP but later was intubated on MV was started on Empiric broad spectrum Abx --> continue clinda  Cardiac arrest due to hypoxia: underwent CPR after pulse became thready after patient was reintubated when he was not being ventilated. Chest tube was also placed. Acute renal failure with h/o CKD stage 3: was initially started on HD but could not tolerate. CRRT stopped, nephro wants to wait until BP to try \"furosemide stress test\"  Hyperkalemia-resolved: in the setting of above, improving  Elevated troponin: likely type 2 MI in the setting of respiratory as well as kidney failure, does have h/o CAD with multiple stents, Cardio on board. HFpEF:  EF 50-55% in . Repeat echo with simillar EF, hypokinetic RV with bowing of Interventricular septum towards LV. Chronic LE wounds: Wound care on board. Likely infected, Purulent drainage from RLE 1st metatarsal which is foul smelling. General surgery on consult, appreciate recs.  Wound cultures sent, follow up    Diet Diet NPO  ADULT TUBE FEEDING; Nasogastric; Peptide Based High Protein; Continuous; 20; Yes; 10; Q 4 hours; 65; 30; Q 4 hours; Protein; daily, via NGT   DVT Prophylaxis [] Lovenox, [x]  Heparin, [] SCDs, [] Ambulation,  [] Eliquis, [] Xarelto  [] Coumadin   Code Status Full Code   Disposition From: Home  Expected Disposition: TBD  Estimated Date of Discharge: TBD  Patient requires continued admission due to Respiratory and renal failure   Surrogate Decision Maker/ POA      Subjective:     Chief Complaint: Respiratory Distress     Patient intubated      Review of Systems:    Review of Systems  Could not be obtained, patient intubated    Objective: Intake/Output Summary (Last 24 hours) at 2/16/2023 0824  Last data filed at 2/16/2023 1780  Gross per 24 hour   Intake 2431.08 ml   Output 2698 ml   Net -266.92 ml          Vitals:   Vitals:    02/16/23 0754   BP:    Pulse: 61   Resp: 16   Temp:    SpO2: 92%       Physical Exam:   Physical Exam General: Ill-appearing male, NAD, intubated  Eyes: EOMI  ENT: neck supple, ETT  Cardiovascular: Regular rate. Respiratory: Clear to auscultation, mechanical ventilation  Gastrointestinal: Obese, soft, non tender  Genitourinary: no suprapubic tenderness, Fuller catheter  Musculoskeletal: wounds covered in bandages   Skin: warm, dry  Neuro: Unresponsive on ventilator.       Medications:   Medications:    famotidine  20 mg Per NG tube Daily    clindamycin (CLEOCIN) IV  600 mg IntraVENous Q8H    heparin (porcine)  5,000 Units SubCUTAneous 3 times per day    sodium hypochlorite   Irrigation Daily    collagenase   Topical Daily    sodium chloride flush  5-40 mL IntraVENous 2 times per day    aspirin  81 mg Oral Daily    [Held by provider] clopidogrel  75 mg Oral Daily    ipratropium-albuterol  1 ampule Inhalation Q4H WA    chlorhexidine  15 mL Mouth/Throat BID      Infusions:    dextrose      sodium chloride      propofol 30 mcg/kg/min (02/16/23 0704)     PRN Meds: heparin flush, 240 Units, PRN  fentanNYL, 50 mcg, Q1H PRN  midazolam, 1 mg, Q2H PRN  glucose, 4 tablet, PRN  dextrose bolus, 125 mL, PRN   Or  dextrose bolus, 250 mL, PRN  glucagon (rDNA), 1 mg, PRN  dextrose, , Continuous PRN  sodium chloride flush, 10 mL, PRN  sodium chloride, , PRN      Labs      Recent Results (from the past 24 hour(s))   POCT Glucose    Collection Time: 02/15/23 12:11 PM   Result Value Ref Range    POC Glucose 164 (H) 70 - 99 MG/DL   Calcium, Ionized    Collection Time: 02/15/23  1:35 PM   Result Value Ref Range    Ionized Ca 1.17 1.12 - 1.32 mMOL/L    Calcium, Ionized 4.68 4.48 - 5.28 MG/DL   Comprehensive Metabolic Panel w/ Reflex to MG    Collection Time: 02/15/23  3:30 PM   Result Value Ref Range    Sodium 134 (L) 135 - 145 MMOL/L    Potassium 4.1 3.5 - 5.1 MMOL/L    Chloride 102 99 - 110 mMol/L    CO2 26 21 - 32 MMOL/L    BUN 14 6 - 23 MG/DL    Creatinine 1.8 (H) 0.9 - 1.3 MG/DL    Est, Glom Filt Rate 39 (L) >60 mL/min/1.73m2    Glucose 166 (H) 70 - 99 MG/DL    Calcium 8.4 8.3 - 10.6 MG/DL    Albumin 2.5 (L) 3.4 - 5.0 GM/DL    Total Protein 5.8 (L) 6.4 - 8.2 GM/DL    Total Bilirubin 0.9 0.0 - 1.0 MG/DL    ALT 8 (L) 10 - 40 U/L    AST 12 (L) 15 - 37 IU/L    Alkaline Phosphatase 84 40 - 128 IU/L    Anion Gap 6 4 - 16   Phosphorus    Collection Time: 02/15/23  3:30 PM   Result Value Ref Range    Phosphorus 2.6 2.5 - 4.9 MG/DL   Magnesium    Collection Time: 02/15/23  3:30 PM   Result Value Ref Range    Magnesium 1.9 1.8 - 2.4 mg/dl   POCT Glucose    Collection Time: 02/15/23  7:39 PM   Result Value Ref Range    POC Glucose 191 (H) 70 - 99 MG/DL   POCT Glucose    Collection Time: 02/15/23 11:04 PM   Result Value Ref Range    POC Glucose 252 (H) 70 - 99 MG/DL   POCT Glucose    Collection Time: 02/16/23  3:10 AM   Result Value Ref Range    POC Glucose 198 (H) 70 - 99 MG/DL   CBC with Auto Differential    Collection Time: 02/16/23  4:25 AM   Result Value Ref Range    WBC 5.8 4.0 - 10.5 K/CU MM    RBC 2.92 (L) 4.6 - 6.2 M/CU MM    Hemoglobin 7.5 (L) 13.5 - 18.0 GM/DL    Hematocrit 25.3 (L) 42 - 52 %    MCV 86.6 78 - 100 FL    MCH 25.7 (L) 27 - 31 PG    MCHC 29.6 (L) 32.0 - 36.0 %    RDW 18.6 (H) 11.7 - 14.9 %    Platelets 071 (L) 065 - 440 K/CU MM    MPV 11.1 7.5 - 11.1 FL    Differential Type AUTOMATED DIFFERENTIAL     Segs Relative 71.5 (H) 36 - 66 %    Lymphocytes % 15.8 (L) 24 - 44 %    Monocytes % 10.5 (H) 0 - 4 %    Eosinophils % 1.4 0 - 3 % Basophils % 0.5 0 - 1 %    Segs Absolute 4.2 K/CU MM    Lymphocytes Absolute 0.9 K/CU MM    Monocytes Absolute 0.6 K/CU MM    Eosinophils Absolute 0.1 K/CU MM    Basophils Absolute 0.0 K/CU MM    Nucleated RBC % 0.5 %    Total Nucleated RBC 0.0 K/CU MM    Total Immature Neutrophil 0.02 K/CU MM    Immature Neutrophil % 0.3 0 - 0.43 %   Calcium, Ionized    Collection Time: 02/16/23  4:25 AM   Result Value Ref Range    Ionized Ca 1.14 1.12 - 1.32 mMOL/L    Calcium, Ionized 4.56 4.48 - 5.28 MG/DL   Comprehensive Metabolic Panel w/ Reflex to MG    Collection Time: 02/16/23  4:25 AM   Result Value Ref Range    Sodium 136 135 - 145 MMOL/L    Potassium 4.0 3.5 - 5.1 MMOL/L    Chloride 102 99 - 110 mMol/L    CO2 26 21 - 32 MMOL/L    BUN 18 6 - 23 MG/DL    Creatinine 2.2 (H) 0.9 - 1.3 MG/DL    Est, Glom Filt Rate 31 (L) >60 mL/min/1.73m2    Glucose 179 (H) 70 - 99 MG/DL    Calcium 7.9 (L) 8.3 - 10.6 MG/DL    Albumin 2.4 (L) 3.4 - 5.0 GM/DL    Total Protein 5.6 (L) 6.4 - 8.2 GM/DL    Total Bilirubin 0.8 0.0 - 1.0 MG/DL    ALT 7 (L) 10 - 40 U/L    AST 12 (L) 15 - 37 IU/L    Alkaline Phosphatase 82 40 - 128 IU/L    Anion Gap 8 4 - 16   Magnesium    Collection Time: 02/16/23  4:25 AM   Result Value Ref Range    Magnesium 2.0 1.8 - 2.4 mg/dl   Phosphorus    Collection Time: 02/16/23  4:25 AM   Result Value Ref Range    Phosphorus 2.8 2.5 - 4.9 MG/DL   POCT Glucose    Collection Time: 02/16/23  6:58 AM   Result Value Ref Range    POC Glucose 182 (H) 70 - 99 MG/DL        Imaging/Diagnostics Last 24 Hours   XR CHEST PORTABLE    Result Date: 2/11/2023  EXAMINATION: ONE XRAY VIEW OF THE CHEST 2/11/2023 7:29 pm COMPARISON: Chest radiograph 02/11/2023 HISTORY: ORDERING SYSTEM PROVIDED HISTORY: ETT placement TECHNOLOGIST PROVIDED HISTORY: Reason for exam:->ETT placement Reason for Exam: et and og tube placement confirmation Additional signs and symptoms: et and og tube placement confrimation FINDINGS: Lines and tubes: -ETT terminates at the superior margin of the clavicles. -enteric tube takes a the subdiaphragmatic course and terminates out of the field of view -right-sided Cordis catheter, which terminates within the mid/upper SVC Lungs: There is indistinctness of the pulmonary vasculature with patchy opacities within the right greater than left lungs. . Pleura: Small right-sided pleural effusion. Cardiomediastinal silhouette: Enlarged cardiac silhouette. Bones: No acute osseous findings. Soft tissues: Left-sided 2 lead cardiac device. Lines and tubes as above. The ETT terminates at the level of the superior margin of the clavicles. Grossly unchanged pulmonary exam.  Findings favor cardiogenic pulmonary edema. However a superimposed infectious process cannot be excluded.        Electronically signed by Kirby Anna MD on 2/16/2023 at 8:24 AM

## 2023-02-16 NOTE — PLAN OF CARE
Problem: Discharge Planning  Goal: Discharge to home or other facility with appropriate resources  Outcome: Progressing  Flowsheets (Taken 2/15/2023 2110 by Dwight Romero RN)  Discharge to home or other facility with appropriate resources:   Identify barriers to discharge with patient and caregiver   Identify discharge learning needs (meds, wound care, etc)     Problem: Pain  Goal: Verbalizes/displays adequate comfort level or baseline comfort level  Outcome: Progressing  Flowsheets (Taken 2/15/2023 2000 by Dwight Romero RN)  Verbalizes/displays adequate comfort level or baseline comfort level: Assess pain using appropriate pain scale     Problem: Confusion  Goal: Confusion, delirium, dementia, or psychosis is improved or at baseline  Description: INTERVENTIONS:  1. Assess for possible contributors to thought disturbance, including medications, impaired vision or hearing, underlying metabolic abnormalities, dehydration, psychiatric diagnoses, and notify attending LIP  2. [place] high risk fall precautions, as indicated  3. Provide frequent short contacts to provide reality reorientation, refocusing and direction  4. Decrease environmental stimuli, including noise as appropriate  5. Monitor and intervene to maintain adequate nutrition, hydration, elimination, sleep and activity  6. If unable to ensure safety without constant attention obtain sitter and review sitter guidelines with assigned personnel  7.  Initiate Psychosocial CNS and Spiritual Care consult, as indicated  Outcome: Progressing     Problem: Chronic Conditions and Co-morbidities  Goal: Patient's chronic conditions and co-morbidity symptoms are monitored and maintained or improved  Outcome: Progressing  Flowsheets (Taken 2/15/2023 2110 by Dwight Romero RN)  Care Plan - Patient's Chronic Conditions and Co-Morbidity Symptoms are Monitored and Maintained or Improved: Monitor and assess patient's chronic conditions and comorbid symptoms for stability, deterioration, or improvement     Problem: Nutrition Deficit:  Goal: Optimize nutritional status  Outcome: Progressing     Problem: Skin/Tissue Integrity  Goal: Absence of new skin breakdown  Description: 1. Monitor for areas of redness and/or skin breakdown  2. Assess vascular access sites hourly  3. Every 4-6 hours minimum:  Change oxygen saturation probe site  4. Every 4-6 hours:  If on nasal continuous positive airway pressure, respiratory therapy assess nares and determine need for appliance change or resting period.   Outcome: Progressing     Problem: Safety - Adult  Goal: Free from fall injury  Outcome: Progressing  Flowsheets (Taken 2/16/2023 0047)  Free From Fall Injury:   Instruct family/caregiver on patient safety   Based on caregiver fall risk screen, instruct family/caregiver to ask for assistance with transferring infant if caregiver noted to have fall risk factors     Problem: ABCDS Injury Assessment  Goal: Absence of physical injury  Outcome: Progressing  Flowsheets (Taken 2/16/2023 0047)  Absence of Physical Injury: Implement safety measures based on patient assessment

## 2023-02-17 ENCOUNTER — APPOINTMENT (OUTPATIENT)
Dept: GENERAL RADIOLOGY | Age: 73
DRG: 981 | End: 2023-02-17
Payer: MEDICARE

## 2023-02-17 LAB
ALBUMIN SERPL-MCNC: 2.4 GM/DL (ref 3.4–5)
ALP BLD-CCNC: 74 IU/L (ref 40–128)
ALT SERPL-CCNC: 6 U/L (ref 10–40)
ANION GAP SERPL CALCULATED.3IONS-SCNC: 7 MMOL/L (ref 4–16)
AST SERPL-CCNC: 14 IU/L (ref 15–37)
BASOPHILS ABSOLUTE: 0 K/CU MM
BASOPHILS RELATIVE PERCENT: 0.3 % (ref 0–1)
BILIRUB SERPL-MCNC: 0.8 MG/DL (ref 0–1)
BUN SERPL-MCNC: 26 MG/DL (ref 6–23)
CALCIUM IONIZED: 4.52 MG/DL (ref 4.48–5.28)
CALCIUM SERPL-MCNC: 7.6 MG/DL (ref 8.3–10.6)
CHLORIDE BLD-SCNC: 101 MMOL/L (ref 99–110)
CO2: 27 MMOL/L (ref 21–32)
CREAT SERPL-MCNC: 3 MG/DL (ref 0.9–1.3)
DIFFERENTIAL TYPE: ABNORMAL
EOSINOPHILS ABSOLUTE: 0.1 K/CU MM
EOSINOPHILS RELATIVE PERCENT: 0.9 % (ref 0–3)
GFR SERPL CREATININE-BSD FRML MDRD: 21 ML/MIN/1.73M2
GLUCOSE BLD-MCNC: 177 MG/DL (ref 70–99)
GLUCOSE BLD-MCNC: 187 MG/DL (ref 70–99)
GLUCOSE BLD-MCNC: 190 MG/DL (ref 70–99)
GLUCOSE BLD-MCNC: 207 MG/DL (ref 70–99)
GLUCOSE SERPL-MCNC: 181 MG/DL (ref 70–99)
HCT VFR BLD CALC: 24.4 % (ref 42–52)
HEMOGLOBIN: 7.4 GM/DL (ref 13.5–18)
IMMATURE NEUTROPHIL %: 0.5 % (ref 0–0.43)
IONIZED CA: 1.13 MMOL/L (ref 1.12–1.32)
LYMPHOCYTES ABSOLUTE: 0.9 K/CU MM
LYMPHOCYTES RELATIVE PERCENT: 13.8 % (ref 24–44)
MAGNESIUM: 1.9 MG/DL (ref 1.8–2.4)
MCH RBC QN AUTO: 25.7 PG (ref 27–31)
MCHC RBC AUTO-ENTMCNC: 30.3 % (ref 32–36)
MCV RBC AUTO: 84.7 FL (ref 78–100)
MONOCYTES ABSOLUTE: 0.5 K/CU MM
MONOCYTES RELATIVE PERCENT: 8.2 % (ref 0–4)
NUCLEATED RBC %: 0 %
PDW BLD-RTO: 19.3 % (ref 11.7–14.9)
PHOSPHORUS: 2.7 MG/DL (ref 2.5–4.9)
PLATELET # BLD: 111 K/CU MM (ref 140–440)
PMV BLD AUTO: 10.2 FL (ref 7.5–11.1)
POTASSIUM SERPL-SCNC: 4 MMOL/L (ref 3.5–5.1)
RBC # BLD: 2.88 M/CU MM (ref 4.6–6.2)
SEGMENTED NEUTROPHILS ABSOLUTE COUNT: 4.9 K/CU MM
SEGMENTED NEUTROPHILS RELATIVE PERCENT: 76.3 % (ref 36–66)
SODIUM BLD-SCNC: 135 MMOL/L (ref 135–145)
TOTAL IMMATURE NEUTOROPHIL: 0.03 K/CU MM
TOTAL NUCLEATED RBC: 0 K/CU MM
TOTAL PROTEIN: 5.3 GM/DL (ref 6.4–8.2)
WBC # BLD: 6.4 K/CU MM (ref 4–10.5)

## 2023-02-17 PROCEDURE — 6360000002 HC RX W HCPCS: Performed by: SPECIALIST

## 2023-02-17 PROCEDURE — 87070 CULTURE OTHR SPECIMN AEROBIC: CPT

## 2023-02-17 PROCEDURE — 2580000003 HC RX 258: Performed by: SPECIALIST

## 2023-02-17 PROCEDURE — 6370000000 HC RX 637 (ALT 250 FOR IP): Performed by: NURSE PRACTITIONER

## 2023-02-17 PROCEDURE — 84100 ASSAY OF PHOSPHORUS: CPT

## 2023-02-17 PROCEDURE — 99232 SBSQ HOSP IP/OBS MODERATE 35: CPT | Performed by: INTERNAL MEDICINE

## 2023-02-17 PROCEDURE — 2700000000 HC OXYGEN THERAPY PER DAY

## 2023-02-17 PROCEDURE — 94761 N-INVAS EAR/PLS OXIMETRY MLT: CPT

## 2023-02-17 PROCEDURE — 6360000002 HC RX W HCPCS: Performed by: NURSE PRACTITIONER

## 2023-02-17 PROCEDURE — 83735 ASSAY OF MAGNESIUM: CPT

## 2023-02-17 PROCEDURE — 2580000003 HC RX 258: Performed by: NURSE PRACTITIONER

## 2023-02-17 PROCEDURE — 89220 SPUTUM SPECIMEN COLLECTION: CPT

## 2023-02-17 PROCEDURE — 6360000002 HC RX W HCPCS: Performed by: INTERNAL MEDICINE

## 2023-02-17 PROCEDURE — 94640 AIRWAY INHALATION TREATMENT: CPT

## 2023-02-17 PROCEDURE — 82330 ASSAY OF CALCIUM: CPT

## 2023-02-17 PROCEDURE — 99213 OFFICE O/P EST LOW 20 MIN: CPT

## 2023-02-17 PROCEDURE — 2000000000 HC ICU R&B

## 2023-02-17 PROCEDURE — 85025 COMPLETE CBC W/AUTO DIFF WBC: CPT

## 2023-02-17 PROCEDURE — 94003 VENT MGMT INPAT SUBQ DAY: CPT

## 2023-02-17 PROCEDURE — 71045 X-RAY EXAM CHEST 1 VIEW: CPT

## 2023-02-17 PROCEDURE — 87205 SMEAR GRAM STAIN: CPT

## 2023-02-17 PROCEDURE — 80053 COMPREHEN METABOLIC PANEL: CPT

## 2023-02-17 PROCEDURE — 82962 GLUCOSE BLOOD TEST: CPT

## 2023-02-17 RX ORDER — FUROSEMIDE 10 MG/ML
160 INJECTION INTRAMUSCULAR; INTRAVENOUS ONCE
Status: COMPLETED | OUTPATIENT
Start: 2023-02-17 | End: 2023-02-17

## 2023-02-17 RX ADMIN — SODIUM HYPOCHLORITE: 1.25 SOLUTION TOPICAL at 11:05

## 2023-02-17 RX ADMIN — FUROSEMIDE 160 MG: 10 INJECTION, SOLUTION INTRAMUSCULAR; INTRAVENOUS at 07:00

## 2023-02-17 RX ADMIN — PROPOFOL 15 MCG/KG/MIN: 10 INJECTION, EMULSION INTRAVENOUS at 01:59

## 2023-02-17 RX ADMIN — HEPARIN SODIUM 5000 UNITS: 5000 INJECTION INTRAVENOUS; SUBCUTANEOUS at 21:12

## 2023-02-17 RX ADMIN — CEFEPIME HYDROCHLORIDE 1000 MG: 1 INJECTION, POWDER, FOR SOLUTION INTRAMUSCULAR; INTRAVENOUS at 23:20

## 2023-02-17 RX ADMIN — IPRATROPIUM BROMIDE AND ALBUTEROL SULFATE 1 AMPULE: 2.5; .5 SOLUTION RESPIRATORY (INHALATION) at 15:30

## 2023-02-17 RX ADMIN — CHLORHEXIDINE GLUCONATE 0.12% ORAL RINSE 15 ML: 1.2 LIQUID ORAL at 09:39

## 2023-02-17 RX ADMIN — IPRATROPIUM BROMIDE AND ALBUTEROL SULFATE 1 AMPULE: 2.5; .5 SOLUTION RESPIRATORY (INHALATION) at 12:01

## 2023-02-17 RX ADMIN — IPRATROPIUM BROMIDE AND ALBUTEROL SULFATE 1 AMPULE: 2.5; .5 SOLUTION RESPIRATORY (INHALATION) at 19:40

## 2023-02-17 RX ADMIN — LINEZOLID 600 MG: 600 INJECTION, SOLUTION INTRAVENOUS at 11:22

## 2023-02-17 RX ADMIN — ASPIRIN 81 MG CHEWABLE TABLET 81 MG: 81 TABLET CHEWABLE at 09:39

## 2023-02-17 RX ADMIN — COLLAGENASE SANTYL: 250 OINTMENT TOPICAL at 11:06

## 2023-02-17 RX ADMIN — CHLORHEXIDINE GLUCONATE 0.12% ORAL RINSE 15 ML: 1.2 LIQUID ORAL at 19:35

## 2023-02-17 RX ADMIN — SODIUM CHLORIDE, PRESERVATIVE FREE 10 ML: 5 INJECTION INTRAVENOUS at 19:34

## 2023-02-17 RX ADMIN — FAMOTIDINE 20 MG: 20 TABLET ORAL at 09:39

## 2023-02-17 RX ADMIN — CEFEPIME HYDROCHLORIDE 1000 MG: 1 INJECTION, POWDER, FOR SOLUTION INTRAMUSCULAR; INTRAVENOUS at 11:30

## 2023-02-17 RX ADMIN — PROPOFOL 15 MCG/KG/MIN: 10 INJECTION, EMULSION INTRAVENOUS at 09:37

## 2023-02-17 RX ADMIN — HEPARIN SODIUM 5000 UNITS: 5000 INJECTION INTRAVENOUS; SUBCUTANEOUS at 05:31

## 2023-02-17 RX ADMIN — HEPARIN SODIUM 5000 UNITS: 5000 INJECTION INTRAVENOUS; SUBCUTANEOUS at 14:02

## 2023-02-17 RX ADMIN — IPRATROPIUM BROMIDE AND ALBUTEROL SULFATE 1 AMPULE: 2.5; .5 SOLUTION RESPIRATORY (INHALATION) at 07:54

## 2023-02-17 RX ADMIN — PROPOFOL 15 MCG/KG/MIN: 10 INJECTION, EMULSION INTRAVENOUS at 14:03

## 2023-02-17 RX ADMIN — SODIUM CHLORIDE, PRESERVATIVE FREE 10 ML: 5 INJECTION INTRAVENOUS at 09:40

## 2023-02-17 RX ADMIN — PROPOFOL 15 MCG/KG/MIN: 10 INJECTION, EMULSION INTRAVENOUS at 23:25

## 2023-02-17 RX ADMIN — LINEZOLID 600 MG: 600 INJECTION, SOLUTION INTRAVENOUS at 23:22

## 2023-02-17 ASSESSMENT — PULMONARY FUNCTION TESTS
PIF_VALUE: 20
PIF_VALUE: 21
PIF_VALUE: 20

## 2023-02-17 ASSESSMENT — PAIN SCALES - GENERAL
PAINLEVEL_OUTOF10: 0

## 2023-02-17 NOTE — PROGRESS NOTES
I have personally seen and examined the patient independently. I have reviewed the patient's available data,including medical history and recent test results. Reviewed and discussed note as documented by WAGNER. I agree with the physical exam findings, assessment and plan. My documented complex medical decisions constitute a substantive portion of the supervisory note. 47 minutes    Acute respiratory failure with hypoxia, hypercapnia  Hypervolemia/volume overload, mild pulmonary edema  Acute kidney injury (superimposed on CKD stage III), currently requires dialytic support (transitioned to intermittent)  Hypervolemia/pulmonary edema due to above  Coronary artery disease  History of chronic diastolic heart failure (suspect acute on chronic)  ICD  History of colon resection for colon cancer  Diabetes mellitus  Severe peripheral vascular disease, purulent wounds noted both lower extremities    Continue full ventilatory support, attempted trial of spontaneous breathing poorly tolerated (dys-synchronous, desaturation)  Continue chest tube right chest (placed 2/16/2023 given large pleural effusion, suspected transudate)  Dialysis/volume removal per nephrology service (note \"trial\" of high dose loop diuretic)  Antimicrobial therapy for treatment of lower extremity wound infections, adjusted to cover MRSA, Pseudomonas  Glycemic control  Oral nutritional support  Ulcer, DVT prophylaxis      Complex medical decisions required for today's evaluation and management reviewed in detail during critical care rounds. I foresee that this patient will likely require tracheostomy, feeding tube placement, ongoing dialytic support if the family/POA wish to continue aggressive medical measures.     [facility]  [phone]

## 2023-02-17 NOTE — PROGRESS NOTES
CARDIOLOGY  NOTE        Name:  Tegan Adamson /Age/Sex: 1950  (67 y.o. male)   MRN & CSN:  [id number] & [id number] Admission Date/Time: 2023 10:42 AM   Location:  -A PCP: Zeferino Villanueva Day: 7        PLAN FROM CARDIOLOGY FOR TODAY: Supportive care  - cardiology consult is for: Elevated troponin    -  Interval history: Patient was extubated however went into respiratory failure and coded. Needed CPR was resuscitated and reintubated    ASSESSMENT/ PLAN:      - cad patient history of known CAD and stents in all 3 arteries and had cardiac cath done in 2018 which showed the stents are patent recent echo was normal  Last Cardiolite done in 2020 showed the EF was 48% showed inferior wall MI no ischemia was seen  Patient's troponin is elevated there probably secondary to type II elevation given that the patient creatinine is for now   - acute kidney injury per renal we will try furosemide stress test per renal and plan as the progress  -hfpef aggressive diuresis and monitor    -icd last ICD check was on 2023 which showed that the OptiVol was normal   -pvd had multiple interventions done last 1 was done in October of last year   Respiratory failure reintubated  Severe pulmonary hypertension          Subjective: Todays complain: Patient on vent    HPI:  Jase Neumann is a 67 y. o.year old who and presents with had concerns including Respiratory Distress. Chief Complaint   Patient presents with    Respiratory Distress           Objective: Temperature:  Current - Temp: 99 °F (37.2 °C);  Max - Temp  Av.4 °F (36.9 °C)  Min: 97 °F (36.1 °C)  Max: 100 °F (37.8 °C)    Respiratory Rate : Resp  Av.9  Min: 15  Max: 21    Pulse Range: Pulse  Av.5  Min: 60  Max: 99    Blood Presuure Range:  Systolic (49SYP), KCR:467 , Min:91 , ABX:574   ; Diastolic (26WVD), IYZ:93, Min:50, Max:63      Pulse ox Range: SpO2 Av.2 %  Min: 92 %  Max: 100 %    24hr I & O:    Intake/Output Summary (Last 24 hours) at 2023 0731  Last data filed at 2023 0533  Gross per 24 hour   Intake 2419.89 ml   Output 1790 ml   Net 629.89 ml         BP (!) 109/55   Pulse 66   Temp 99 °F (37.2 °C) (Rectal)   Resp 20   Ht 6' (1.829 m)   Wt 281 lb 12 oz (127.8 kg)   SpO2 100%   BMI 38.21 kg/m²           Review of Systems: On vent    TELEMETRY: Atrial paced rhythm   has a past medical history of Acid reflux, Acute MI ([address] Arthritis, Broken teeth, CAD (coronary artery disease), Cardiomyopathy ([address] Cerebral artery occlusion with cerebral infarction ([address] CHF (congestive heart failure) ([address] Chronic back pain, Chronic kidney disease, Diabetes mellitus ([address] Diabetic neuropathy ([address] H/O cardiovascular stress test, H/O Doppler ultrasound, H/O percutaneous left heart catheterization, History of irregular heartbeat, History of syncope, Hyperlipidemia, Hypertension, Leg swelling, Necrotic toes (HCC), Neuropathy, PAD (peripheral artery disease) ([address] PVD (peripheral vascular disease) ([address] Sick sinus syndrome ([address] Sleep apnea, Spinal stenosis, Teeth missing, Type 2 diabetes mellitus without complication ([address] and WD-Chronic foot ulcer, left, with necrosis of bone ([address] has a past surgical history that includes Coronary angioplasty with stent; Dental surgery; Colonoscopy (2016); pacemaker placement (2010); vascular surgery; colectomy (Right, 2016); Toe amputation (Right, 2017); Toe amputation (Right, 2018); Cardiac catheterization; Cardiac defibrillator placement (2010); Coronary angioplasty; Cardiac catheterization (2017); Cardiac catheterization (2018); Toe amputation (Left, 2020); IR TUNNELED CVC PLACE WO SQ PORT/PUMP > 5 YEARS (2021); Toe amputation (Left, 2022);  Toe amputation (Right, 10/20/2022); and IR TUNNELED CVC PLACE WO SQ PORT/PUMP > 5 YEARS (11/17/2022). Physical Exam:  General: On vent  Head:normal  Eye: Pupils equal and round  Neck:  No JVD, no carotid bruit noted   Chest:  Clear to auscultation, no signs of respiratory distress  Cardiovascular:  Normal rate and rhythm. S1 and S2 noted. No murmurs rubs or gallops  Abdomen:   nontender  Extremities:  tr edema  Pulses; palpable  Neuro: On vent2    Medications:    cefepime  1,000 mg IntraVENous Q12H    linezolid  600 mg IntraVENous Q12H    famotidine  20 mg Per NG tube Daily    heparin (porcine)  5,000 Units SubCUTAneous 3 times per day    sodium hypochlorite   Irrigation Daily    collagenase   Topical Daily    sodium chloride flush  5-40 mL IntraVENous 2 times per day    aspirin  81 mg Oral Daily    [Held by provider] clopidogrel  75 mg Oral Daily    ipratropium-albuterol  1 ampule Inhalation Q4H WA    chlorhexidine  15 mL Mouth/Throat BID      dextrose      sodium chloride      propofol 15 mcg/kg/min (02/17/23 0533)     heparin flush, fentanNYL, midazolam, glucose, dextrose bolus **OR** dextrose bolus, glucagon (rDNA), dextrose, sodium chloride flush, sodium chloride    Lab Data:  CBC:   Recent Labs     02/15/23  0557 02/16/23  0425 02/17/23  0320   WBC 4.4 5.8 6.4   HGB 7.1* 7.5* 7.4*   HCT 23.5* 25.3* 24.4*   MCV 85.8 86.6 84.7   PLT 92* 115* 111*     BMP:   Recent Labs     02/15/23  0557 02/15/23  1530 02/16/23  0425 02/17/23  0320    134* 136 135   K 3.7 4.1 4.0 4.0    102 102 101   CO2 27 26 26 27   PHOS 2.7 2.6 2.8 2.7   BUN 16 14 18  --    CREATININE 1.9* 1.8* 2.2*  --      LIVER PROFILE:   Recent Labs     02/15/23  0557 02/15/23  1530 02/16/23  0425 02/17/23  0320   AST 11* 12* 12* 14*   ALT 7* 8* 7* 6*   BILITOT 0.7 0.9 0.8  --    ALKPHOS 68 84 82 74     PT/INR: No results for input(s): PROTIME, INR in the last 72 hours. APTT: No results for input(s): APTT in the last 72 hours. BNP:  No results for input(s): BNP in the last 72 hours.   TROPONIN: No results for input(s): TROPONINI in the last 72 hours. No results for input(s): TROPONINT in the last 72 hours. Labs, consult, tests reviewed                    Maged Calle MD, PA-C 2/17/2023 7:31 AM     Please note this report has been partially produced using speech recognition software and may contain errors related to that system including errors in grammar, punctuation, and spelling, as well as words and phrases that may be inappropriate. If there are any questions or concerns please feel free to contact the dictating provider for clarification.

## 2023-02-17 NOTE — PROGRESS NOTES
Nephrology Progress Note  2/17/2023 8:50 AM        Subjective:   Admit Date: 2/11/2023  PCP: Aurora Bacon    Interval History: Remains on mechanical ventilation  Had eventful last 12 hours-had right pleural space chest tube placement for pleural effusion-had malfunction of the ET tube requiring extubation-with brief cardiopulmonary arrest requiring resuscitation-and reintubation    Diet: Tube feed per nasogastric    ROS: Remains on mechanical ventilation after intubation  Small dose of propofol but no vasopressor  Oliguric-no fever, acceptable blood pressure    Data:     Current meds:    cefepime  1,000 mg IntraVENous Q12H    linezolid  600 mg IntraVENous Q12H    famotidine  20 mg Per NG tube Daily    heparin (porcine)  5,000 Units SubCUTAneous 3 times per day    sodium hypochlorite   Irrigation Daily    collagenase   Topical Daily    sodium chloride flush  5-40 mL IntraVENous 2 times per day    aspirin  81 mg Oral Daily    [Held by provider] clopidogrel  75 mg Oral Daily    ipratropium-albuterol  1 ampule Inhalation Q4H WA    chlorhexidine  15 mL Mouth/Throat BID      dextrose      sodium chloride      propofol 15 mcg/kg/min (02/17/23 0533)         I/O last 3 completed shifts:   In: 3725.2 [I.V.:526.8; NG/GT:2492; IV Piggyback:706.4]  Out: 1915 [Urine:315; Chest Tube:1600]    CBC:   Recent Labs     02/15/23  0557 02/16/23  0425 02/17/23  0320   WBC 4.4 5.8 6.4   HGB 7.1* 7.5* 7.4*   PLT 92* 115* 111*          Recent Labs     02/15/23  1530 02/16/23  0425 02/17/23  0320   * 136 135   K 4.1 4.0 4.0    102 101   CO2 26 26 27   BUN 14 18 26*   CREATININE 1.8* 2.2* 3.0*   GLUCOSE 166* 179* 181*       Lab Results   Component Value Date    CALCIUM 7.6 (L) 02/17/2023    PHOS 2.7 02/17/2023       Objective:     Vitals: BP (!) 97/50   Pulse 63   Temp 98.2 °F (36.8 °C) (Rectal)   Resp 21   Ht 6' (1.829 m)   Wt 281 lb 12 oz (127.8 kg)   SpO2 100%   BMI 38.21 kg/m² ,    General appearance: Intubated, minimally sedated, does respond to noxious stimuli  HEENT: Positive conjunctival pallor  Neck: Right IJ temporary dialysis catheter also has nasogastric tube  Lungs: Limited anterior exam positive adventitious breath sound-left pleural space chest tube  Heart: Seems regular rate and rhythm, left chest wall implanted cardiac device  Abdomen: Soft, nontender  Extremities: Bilateral thigh and chronic leg edema leg wound  He does a Fuller catheter      Problem List :         Impression :     Stage III acute kidney injury with underlying CKD stage IV A3-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  Shock syndrome sepsis were suspected-off vasopressin now-with multiple organ dysfunction  Underlying atherosclerotic cardiovascular disease,-diabetes, chronic skin, soft tissue and bone infection-aftermath of acute illness    Recommendation/Plan  :      We will try with\" furosemide stress test\" 1.5 mg/kg Lasix IV x1 roughly-as he was exposed to diuretics as an outpatient-we will see how his renal tubules respond-there is no urgent need for dialysis today-I will continue to sit tight-watch for iatrogenic and nosocomial complication-follow clinically and biochemically-he remains on multiple antimicrobial agent empirically      Amarjit Duarte MD MD

## 2023-02-17 NOTE — CONSULTS
[id number] [facility] Wound Ostomy Continence Nurse  Consult Note       Eyal Villanueva  AGE: 67 y.o. GENDER: male  : 1950  TODAY'S DATE:  2023    Subjective:     Reason for CWOCN Evaluation and Assessment: wound reassessment      Eyal Villanueva is a 67 y.o. male referred by:   [x] Physician  [] Nursing  [] Other:     Wound Identification:  Wound Type: venous, diabetic, and non-healing surgical  Contributing Factors: edema, venous stasis, diabetes, poor glucose control, chronic pressure, decreased mobility, obesity, decreased tissue oxygenation, and ESRD        PAST MEDICAL HISTORY        Diagnosis Date    Acid reflux     Acute MI ([facility] [address] ,     Arthritis     Back    Broken teeth     Upper Front    CAD (coronary artery disease)     Sees Dr. Hawa Cueva [facility])     per old chart    Cerebral artery occlusion with cerebral infarction ([facility] [address]     CHF (congestive heart failure) ([facility] [address]     preserved ejection fraction    Chronic back pain     Chronic kidney disease     Stage IV - patient of Dr Bishop Reich    Diabetes mellitus [facility]) Dx 1965    per old chart pt has been diabetic since age 13    Diabetic neuropathy ([facility] [address]     \"in my feet\"    H/O cardiovascular stress test 2016    H/O Doppler ultrasound 2018    Moderate disease of the right lower extremity with an JALEN of 0.72. Moderate to severe disease of the left lower extremity with an JALEN of 0.55.     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    Neuropathy     both feet    PAD (peripheral artery disease) ([address] 2018    PVD (peripheral vascular disease) ([facility] [address]     Sick sinus syndrome ([facility] [address]     Sleep apnea     \"sleep study 3 yrs ago- could not tolerate the cpap made me too dry\"    Spinal stenosis     Teeth missing     Upper And Lower    Type 2 diabetes mellitus without complication ([facility] [address]     WD-Chronic foot ulcer, left, with necrosis of bone ([facility] [address] 11/12/2021       PAST SURGICAL HISTORY    Past Surgical History:   Procedure Laterality Date    CARDIAC CATHETERIZATION      per old chart done 10/2014    CARDIAC CATHETERIZATION  07/14/2017    with angiography of leg    CARDIAC CATHETERIZATION  11/20/2018    PATENT STENTS OF ALL THREE MAJOR VESSELS    CARDIAC DEFIBRILLATOR PLACEMENT  06/04/2010    [employer] [address] Defibrillator Implanted    COLECTOMY Right 08/26/2016    laparascopic; robotic assisted    COLONOSCOPY  08/04/2016    CORONARY ANGIOPLASTY      \"15 Heart Stents\"    CORONARY ANGIOPLASTY WITH STENT PLACEMENT      per old chart had angio with stent to circumflex and obtuse marginal artery at [facility] 5/2010( old chart also gives hx of stent placement done 2000,2004 and 2005)    DENTAL SURGERY      Teeth Extracted In Past    IR TUNNELED CATHETER PLACEMENT GREATER THAN 5 YEARS  6/14/2021    IR TUNNELED CATHETER PLACEMENT GREATER THAN 5 YEARS 6/14/2021 [facility]    IR TUNNELED CATHETER PLACEMENT GREATER THAN 5 YEARS  11/17/2022    IR TUNNELED CATHETER PLACEMENT GREATER THAN 5 YEARS 11/17/2022 [facility]    PACEMAKER PLACEMENT  06/04/2010    [employer] [address] Defibrillator Implanted    TOE AMPUTATION Right 09/12/2017    Rt 3rd toe    TOE AMPUTATION Right 01/09/2018     Right 5th toe amputation and Toenails trimmed left 2,3,4 and 5th toes    TOE AMPUTATION Left 12/26/2020    LEFT GREAT TOE AMPUTATION performed by Jud Padilla MD at [address] Left 7/26/2022    LEFT SECOND TOE AMPUTATION performed by Awais Corbin MD at [address] Right 10/20/2022    Right First TOE AMPUTATION performed by Awais Corbin MD at [address] SURGERY      per old chart had balloon angioplasty right superfical femoral artery,right popliteal artery,,right ant.tibial artery, right tibioperoneal trunk, and right post.tibial artery wna stent placement right popliteal artery and superfical femoral artery 7/2012       FAMILY HISTORY    Family History   Problem Relation Age of Onset    Diabetes Mother     Stroke Mother     High Blood Pressure Mother     Vision Loss Mother     Cancer Father         Prostate Cancer    Diabetes Sister     Neuropathy Sister     Other Sister         \"Breathing Problems\"    Heart Disease Sister     Early Death Sister 62        Heart Complications    Cancer Brother         \"Stomach Cancer\"    High Blood Pressure Brother     Diabetes Brother     Heart Disease Brother     High Blood Pressure Brother     Cancer Son         \"Testicle Cancer\"       SOCIAL HISTORY    Social History     Tobacco Use    Smoking status: Some Days     Types: Cigars    Smokeless tobacco: Never    Tobacco comments:     Client states he has stopped smoking   Vaping Use    Vaping Use: Never used   Substance Use Topics    Alcohol use: No    Drug use: Yes     Types: Marijuana (Weed)       ALLERGIES    Allergies   Allergen Reactions    Pcn [Penicillins] Hives    Fentanyl Itching       MEDICATIONS    No current facility-administered medications on file prior to encounter. Current Outpatient Medications on File Prior to Encounter   Medication Sig Dispense Refill    gentamicin (GARAMYCIN) 0.1 % cream Apply topically 3 times daily 60 g 3    torsemide (DEMADEX) 100 MG tablet TAKE 1 TABLET BY MOUTH TWICE A DAY IN THE MORNING AND IN THE EVENING 60 tablet 3    gabapentin (NEURONTIN) 300 MG capsule Take 300 mg by mouth in the morning and at bedtime.       atorvastatin (LIPITOR) 40 MG tablet Take 1 tablet by mouth nightly (Patient not taking: No sig reported) 30 tablet 3    carvedilol (COREG) 25 MG tablet Take 1 tablet by mouth 2 times daily 60 tablet 0    clopidogrel (PLAVIX) 75 MG tablet Take 1 tablet by mouth daily 30 tablet 1    amLODIPine (NORVASC) 10 MG tablet Take 1 tablet by mouth daily 30 tablet 1    empagliflozin (JARDIANCE) 10 MG tablet Take 1 tablet by mouth daily (Patient not taking: No sig reported) 30 tablet 1    metOLazone (ZAROXOLYN) 2.5 MG tablet Take 1 tablet by mouth in the morning and at bedtime 30 tablet 0    sodium hypochlorite (DAKINS) 0.125 % SOLN external solution Apply topically daily 1 each 0    aspirin 81 MG chewable tablet Take 1 tablet by mouth daily 30 tablet 3    isosorbide mononitrate (IMDUR) 30 MG extended release tablet Take 1 tablet by mouth daily (Patient not taking: No sig reported) 30 tablet 3    magnesium oxide (MAG-OX) 400 (240 Mg) MG tablet Take 1 tablet by mouth 2 times daily 30 tablet 0    SPIRIVA HANDIHALER 18 MCG inhalation capsule Inhale 18 mcg into the lungs daily (Patient not taking: No sig reported)      albuterol sulfate HFA (VENTOLIN HFA) 108 (90 Base) MCG/ACT inhaler Inhale 2 puffs into the lungs every 4 hours as needed for Wheezing 1 Inhaler 3         Objective:      BP (!) 102/51   Pulse 65   Temp 98.2 °F (36.8 °C) (Rectal)   Resp 21   Ht 6' (1.829 m)   Wt 281 lb 12 oz (127.8 kg)   SpO2 100%   BMI 38.21 kg/m²   Pablo Risk Score: Pablo Scale Score: 14    LABS    CBC:   Lab Results   Component Value Date/Time    WBC 6.4 02/17/2023 03:20 AM    RBC 2.88 02/17/2023 03:20 AM    HGB 7.4 02/17/2023 03:20 AM    HCT 24.4 02/17/2023 03:20 AM    MCV 84.7 02/17/2023 03:20 AM    MCH 25.7 02/17/2023 03:20 AM    MCHC 30.3 02/17/2023 03:20 AM    RDW 19.3 02/17/2023 03:20 AM     02/17/2023 03:20 AM    MPV 10.2 02/17/2023 03:20 AM     CMP:    Lab Results   Component Value Date/Time     02/17/2023 03:20 AM    K 4.0 02/17/2023 03:20 AM    K 5.1 01/10/2018 04:32 AM     02/17/2023 03:20 AM    CO2 27 02/17/2023 03:20 AM    BUN 26 02/17/2023 03:20 AM    CREATININE 3.0 02/17/2023 03:20 AM    GFRAA 25 10/17/2022 06:50 AM    LABGLOM 21 02/17/2023 03:20 AM    GLUCOSE 181 02/17/2023 03:20 AM    PROT 5.3 02/17/2023 03:20 AM    PROT 6.3 01/21/2013 12:10 PM    LABALBU 2.4 02/17/2023 03:20 AM    CALCIUM 7.6 02/17/2023 03:20 AM    BILITOT 0.8 02/17/2023 03:20 AM    ALKPHOS 74 02/17/2023 03:20 AM    AST 14 02/17/2023 03:20 AM    ALT 6 02/17/2023 03:20 AM     Albumin:    Lab Results   Component Value Date/Time    LABALBU 2.4 02/17/2023 03:20 AM     PT/INR:    Lab Results   Component Value Date/Time    PROTIME 14.0 01/11/2023 08:50 AM    PROTIME 11.2 09/08/2011 06:02 PM    INR 1.08 01/11/2023 08:50 AM     HgBA1c:    Lab Results   Component Value Date/Time    LABA1C 7.1 10/12/2022 06:28 AM         Assessment:     Patient Active Problem List   Diagnosis    PAD (peripheral artery disease) ([facility])    Chronic coronary artery disease    Biventricular ICD (implantable cardioverter-defibrillator) in place    Chronic combined systolic and diastolic heart failure ([facility])    Chronic kidney disease, stage III (moderate) ([facility])    Mixed hyperlipidemia    Sick sinus syndrome ([facility])    Type 2 diabetes mellitus with diabetic polyneuropathy ([facility])    Spinal stenosis of lumbar region    Obesity, Class I, BMI 30-34.9    S/P partial colectomy    Tubulovillous adenoma of colon    Microalbuminuria    WD-PVD (peripheral vascular disease) ([facility])    Limb ischemia    Necrotic toes ([facility])    Toe gangrene ([facility])    Diabetic foot infection ([facility] [address]    Chronic kidney disease (CKD) stage G3a/A2, moderately decreased glomerular filtration rate (GFR) between 45-59 mL/min/1.73 square meter and albuminuria creatinine ratio between  mg/g ([facility])    Edema    ICD (implantable cardioverter-defibrillator) battery depletion    Hyperkalemia    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[facility])    Ischemia of toe    Acute kidney injury ([facility] [address]    Fluid overload    DM (diabetes mellitus) ([facility])    Precordial pain    Acute chest pain    Unstable angina ([facility])    Chronic kidney disease (CKD) stage G3a/A3, moderately decreased glomerular filtration rate (GFR) between 45-59 mL/min/1.73 square meter and albuminuria creatinine ratio greater than 300 mg/g (HCC)    Cardiomyopathy (HCC)    Diabetic neuropathy (HCC)    HTN (hypertension)    Epigastric pain    Acute on chronic congestive heart failure (HCC)    Leg edema    Acute on chronic systolic CHF (congestive heart failure) (HCC)    Diabetic foot ulcer with osteomyelitis ([address]    Visit for wound check    Moderate malnutrition ([address]    Long term (current) use of antibiotics    WD-Diabetic ulcer of toe of right foot associated with type 2 diabetes mellitus, with fat layer exposed ([address]    Diabetic ulcer of toe associated with type 2 diabetes mellitus, with bone involvement without evidence of necrosis ([address]    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    Persistent wound pain    Receiving intravenous antibiotic treatment as outpatient    Acute on chronic respiratory failure with hypoxemia ([address]    WD-Chronic foot ulcer, left, with necrosis of bone ([address]    Acute on chronic HFrEF (heart failure with reduced ejection fraction) ([facility])    Scrotal edema    Hypertensive urgency    Diabetic ulcer of right foot due to type 2 diabetes mellitus ([address]    Cellulitis of foot    Toe osteomyelitis (HCC)    Heart failure exacerbated by sotalol (HCC)    CHF (congestive heart failure), NYHA class I, acute on chronic, combined (HCC)    Acute on chronic diastolic CHF (congestive heart failure) (HCC)    Leg swelling    Acute on chronic diastolic heart failure (HCC)    Skin ulcer of left foot including toes with fat layer exposed ([address]    Superficial incisional surgical site infection    Bacteremia due to Enterococcus    Acute respiratory failure with hypoxia and hypercapnia (HCC)    Acute kidney injury superimposed on CKD ([address]    Diabetic foot ([address]    Diabetic ulcer of midfoot associated with diabetes mellitus due to underlying condition, with muscle involvement without evidence of necrosis ([address]    Other seizures ([address] Measurements:  Wound 07/06/17 Other (Comment) Toe (Comment  which one) (Active)   Number of days: 2051       Wound 12/10/20 Foot Left;Lateral fluid filled blister (Active)   Number of days: 798       Wound 10/30/21   #1 Left Dorsal Great Toe amp site  (Active)   Number of days: 475       Wound 10/30/21   #2 Left Dorsal Second Toe  (Active)   Number of days: 718       Wound 10/30/21 #3 Right Great Toe (Active)   Number of days: 089       Wound 02/25/22 #7 Right Dorsal 2nd Toe (Active)   Number of days: 356       Wound 11/11/22 Ankle Right;Medial (Active)   Number of days: 98       Wound 11/11/22 Toe (Comment  which one) Anterior;Right great toe amputation site (Active)   Number of days: 98       Wound 02/09/23 #1 Right Knee (Active)   Wound Image   02/13/23 1013   Wound Etiology Venous 02/17/23 0800   Dressing Status Clean;Dry; Intact 02/17/23 0800   Wound Cleansed Not Cleansed 02/17/23 0800   Dressing/Treatment Silicone pad 17/67/39 0937   Offloading for Diabetic Foot Ulcers Offloading not required 02/17/23 0800   Dressing Change Due 02/19/23 02/16/23 1300   Wound Length (cm) 0.5 cm 02/13/23 1013   Wound Width (cm) 0.4 cm 02/13/23 1013   Wound Depth (cm) 0 cm 02/13/23 1013   Wound Surface Area (cm^2) 0.2 cm^2 02/13/23 1013   Change in Wound Size % (l*w) 86.67 02/13/23 1013   Wound Volume (cm^3) 0 cm^3 02/13/23 1013   Wound Healing % 100 02/13/23 1013   Post-Procedure Length (cm) 1 cm 02/09/23 1214   Post-Procedure Width (cm) 1.5 cm 02/09/23 1214   Post-Procedure Depth (cm) 0.1 cm 02/09/23 1214   Post-Procedure Surface Area (cm^2) 1.5 cm^2 02/09/23 1214   Post-Procedure Volume (cm^3) 0.15 cm^3 02/09/23 1214   Distance Tunneling (cm) 0 cm 02/16/23 1649   Tunneling Position ___ O'Clock 0 02/16/23 1649   Undermining Starts ___ O'Clock 0 02/16/23 1649   Undermining Ends___ O'Clock 0 02/16/23 1649   Undermining Maxium Distance (cm) 0 02/16/23 1649   Wound Assessment Eschar dry;Slough;Pink/red 02/17/23 0400   Drainage Amount None 02/17/23 0800   Drainage Description Serosanguinous 02/17/23 0400   Odor None 02/17/23 0800   Dina-wound Assessment Dry/flaky 02/17/23 0800   Margins Defined edges 02/17/23 0400   Wound Thickness Description not for Pressure Injury Full thickness 02/17/23 0400   Number of days: 8       Wound 02/09/23 #2 Right Medial Lower Leg Cluster (Active)   Wound Image   02/13/23 1013   Wound Etiology Venous 02/17/23 0800   Dressing Status Clean;Dry; Intact 02/17/23 0800   Wound Cleansed Not Cleansed 02/17/23 0800   Dressing/Treatment Pharmaceutical agent (see MAR); Moist to dry;ABD;Roll gauze 02/17/23 0400   Offloading for Diabetic Foot Ulcers Other (comment) 02/17/23 0800   Dressing Change Due 02/16/23 02/17/23 0800   Wound Length (cm) 16 cm 02/13/23 1013   Wound Width (cm) 12 cm 02/13/23 1013   Wound Depth (cm) 0.1 cm 02/13/23 1013   Wound Surface Area (cm^2) 192 cm^2 02/13/23 1013   Change in Wound Size % (l*w) 23.2 02/13/23 1013   Wound Volume (cm^3) 19.2 cm^3 02/13/23 1013   Wound Healing % 23 02/13/23 1013   Post-Procedure Length (cm) 20 cm 02/09/23 1214   Post-Procedure Width (cm) 12.5 cm 02/09/23 1214   Post-Procedure Depth (cm) 0.1 cm 02/09/23 1214   Post-Procedure Surface Area (cm^2) 250 cm^2 02/09/23 1214   Post-Procedure Volume (cm^3) 25 cm^3 02/09/23 1214   Distance Tunneling (cm) 0 cm 02/16/23 1649   Tunneling Position ___ O'Clock 0 02/16/23 1649   Undermining Starts ___ O'Clock 0 02/16/23 1649   Undermining Ends___ O'Clock 0 02/16/23 1649   Undermining Maxium Distance (cm) 0 02/16/23 1649   Wound Assessment Subcutaneous;Pink/red;Slough 02/17/23 0400   Drainage Amount Moderate 02/17/23 0400   Drainage Description Purulent 02/17/23 0400   Odor Moderate 02/17/23 0400   Dina-wound Assessment Maceration 02/17/23 0400   Margins Undefined edges 02/17/23 0400   Wound Thickness Description not for Pressure Injury Full thickness 02/17/23 0400   Number of days: 8       Wound 02/09/23 #3 Right Great toe amp site (Active)   Wound Image   02/13/23 1013   Wound Etiology Surgical 02/17/23 0800   Dressing Status Clean;Dry; Intact 02/17/23 0800   Wound Cleansed Not Cleansed 02/17/23 0800   Dressing/Treatment Pharmaceutical agent (see MAR); Moist to dry;ABD;Roll gauze 02/17/23 0800   Offloading for Diabetic Foot Ulcers Other (comment) 02/17/23 0800   Dressing Change Due 02/16/23 02/17/23 0800   Wound Length (cm) 3.5 cm 02/13/23 1013   Wound Width (cm) 4.5 cm 02/13/23 1013   Wound Depth (cm) 2.2 cm 02/13/23 1013   Wound Surface Area (cm^2) 15.75 cm^2 02/13/23 1013   Change in Wound Size % (l*w) 21.25 02/13/23 1013   Wound Volume (cm^3) 34.65 cm^3 02/13/23 1013   Wound Healing % 36 02/13/23 1013   Post-Procedure Length (cm) 4 cm 02/09/23 1214   Post-Procedure Width (cm) 5 cm 02/09/23 1214   Post-Procedure Depth (cm) 2.7 cm 02/09/23 1214   Post-Procedure Surface Area (cm^2) 20 cm^2 02/09/23 1214   Post-Procedure Volume (cm^3) 54 cm^3 02/09/23 1214   Distance Tunneling (cm) 0 cm 02/16/23 1649   Tunneling Position ___ O'Clock 0 02/16/23 1649   Undermining Starts ___ O'Clock 0 02/16/23 1649   Undermining Ends___ O'Clock 0 02/16/23 1649   Undermining Maxium Distance (cm) 0 02/16/23 1649   Wound Assessment Slough 02/17/23 0400   Drainage Amount Large 02/17/23 0400   Drainage Description Purulent 02/17/23 0400   Odor Malodorous/putrid 02/17/23 0400   Dina-wound Assessment Maceration 02/17/23 0400   Margins Defined edges 02/17/23 0400   Wound Thickness Description not for Pressure Injury Full thickness 02/17/23 0400   Number of days: 8       Wound 02/09/23 #4 Right 4th toe (Active)   Wound Image   02/09/23 1126   Wound Etiology Diabetic 02/17/23 0800   Dressing Status Clean;Dry; Intact 02/17/23 0800   Wound Cleansed Not Cleansed 02/17/23 0800   Dressing/Treatment ABD;Roll gauze 02/17/23 0400   Offloading for Diabetic Foot Ulcers Other (comment) 02/17/23 0800   Dressing Change Due 02/16/23 02/17/23 0800   Wound Length (cm) 0 cm 02/13/23 1013 Wound Width (cm) 0 cm 02/13/23 1013   Wound Depth (cm) 0 cm 02/13/23 1013   Wound Surface Area (cm^2) 0 cm^2 02/13/23 1013   Change in Wound Size % (l*w) 100 02/13/23 1013   Wound Volume (cm^3) 0 cm^3 02/13/23 1013   Wound Healing % 100 02/13/23 1013   Post-Procedure Length (cm) 0.5 cm 02/09/23 1214   Post-Procedure Width (cm) 0.8 cm 02/09/23 1214   Post-Procedure Depth (cm) 0.1 cm 02/09/23 1214   Post-Procedure Surface Area (cm^2) 0.4 cm^2 02/09/23 1214   Post-Procedure Volume (cm^3) 0.04 cm^3 02/09/23 1214   Distance Tunneling (cm) 0 cm 02/16/23 1649   Tunneling Position ___ O'Clock 0 02/16/23 1649   Undermining Starts ___ O'Clock 0 02/16/23 1649   Undermining Ends___ O'Clock 0 02/16/23 1649   Undermining Maxium Distance (cm) 0 02/16/23 1649   Wound Assessment Dry 02/17/23 0800   Drainage Amount None 02/17/23 0800   Drainage Description Yellow;Green 02/17/23 0400   Odor Malodorous/putrid 02/17/23 0400   Dina-wound Assessment Maceration 02/17/23 0400   Margins Defined edges 02/17/23 0400   Wound Thickness Description not for Pressure Injury Full thickness 02/17/23 0400   Number of days: 8       Wound 02/09/23 #5 Left lateral lower leg cluster (Active)   Wound Image   02/13/23 1013   Wound Etiology Venous 02/17/23 0800   Dressing Status Clean;Dry; Intact 02/17/23 0800   Wound Cleansed Not Cleansed 02/17/23 0800   Dressing/Treatment Pharmaceutical agent (see MAR); Moist to dry;ABD;Roll gauze 02/17/23 0400   Offloading for Diabetic Foot Ulcers Other (comment) 02/17/23 0800   Dressing Change Due 02/16/23 02/17/23 0800   Wound Length (cm) 11 cm 02/13/23 1013   Wound Width (cm) 6 cm 02/13/23 1013   Wound Depth (cm) 0.1 cm 02/13/23 1013   Wound Surface Area (cm^2) 66 cm^2 02/13/23 1013   Change in Wound Size % (l*w) -175 02/13/23 1013   Wound Volume (cm^3) 6.6 cm^3 02/13/23 1013   Wound Healing % -175 02/13/23 1013   Post-Procedure Length (cm) 4 cm 02/09/23 1214   Post-Procedure Width (cm) 6 cm 02/09/23 1214 Post-Procedure Depth (cm) 0.1 cm 02/09/23 1214   Post-Procedure Surface Area (cm^2) 24 cm^2 02/09/23 1214   Post-Procedure Volume (cm^3) 2.4 cm^3 02/09/23 1214   Distance Tunneling (cm) 0 cm 02/16/23 1649   Tunneling Position ___ O'Clock 0 02/16/23 1649   Undermining Starts ___ O'Clock 0 02/16/23 1649   Undermining Ends___ O'Clock 0 02/16/23 1649   Undermining Maxium Distance (cm) 0 02/16/23 1649   Wound Assessment Pink/red 02/17/23 0400   Drainage Amount Scant 02/17/23 0400   Drainage Description Serosanguinous 02/17/23 0400   Odor Mild 02/17/23 0400   Dina-wound Assessment Dry/flaky 02/17/23 0400   Margins Undefined edges 02/17/23 0400   Wound Thickness Description not for Pressure Injury Full thickness 02/17/23 0400   Number of days: 8       Wound 02/09/23 #6 Left great toe amp site (Active)   Wound Image   02/13/23 1013   Wound Etiology Diabetic 02/17/23 0800   Dressing Status Clean;Dry; Intact 02/17/23 0800   Wound Cleansed Not Cleansed 02/17/23 0800   Dressing/Treatment Pharmaceutical agent (see MAR); Moist to dry;ABD;Roll gauze 02/17/23 0400   Offloading for Diabetic Foot Ulcers Other (comment) 02/17/23 0800   Dressing Change Due 02/16/23 02/17/23 0800   Wound Length (cm) 1.2 cm 02/13/23 1013   Wound Width (cm) 2.5 cm 02/13/23 1013   Wound Depth (cm) 0.3 cm 02/13/23 1013   Wound Surface Area (cm^2) 3 cm^2 02/13/23 1013   Change in Wound Size % (l*w) 50 02/13/23 1013   Wound Volume (cm^3) 0.9 cm^3 02/13/23 1013   Wound Healing % 81 02/13/23 1013   Post-Procedure Length (cm) 2 cm 02/09/23 1214   Post-Procedure Width (cm) 3 cm 02/09/23 1214   Post-Procedure Depth (cm) 0.8 cm 02/09/23 1214   Post-Procedure Surface Area (cm^2) 6 cm^2 02/09/23 1214   Post-Procedure Volume (cm^3) 4.8 cm^3 02/09/23 1214   Distance Tunneling (cm) 0 cm 02/16/23 1649   Tunneling Position ___ O'Clock 0 02/16/23 1649   Undermining Starts ___ O'Clock 0 02/16/23 1649   Undermining Ends___ O'Clock 0 02/16/23 1649   Undermining Maxium Distance (cm) 0 02/16/23 1649   Wound Assessment Slough 02/17/23 0400   Drainage Amount Moderate 02/17/23 0400   Drainage Description Purulent 02/17/23 0400   Odor Malodorous/putrid 02/17/23 0400   Dina-wound Assessment Maceration 02/17/23 0400   Margins Defined edges 02/17/23 0400   Wound Thickness Description not for Pressure Injury Full thickness 02/17/23 0400   Number of days: 8       Wound 02/09/23 #7 Left 2nd toe amp site (Active)   Wound Image   02/09/23 1126   Wound Etiology Diabetic 02/17/23 0800   Dressing Status Clean;Dry; Intact 02/17/23 0800   Wound Cleansed Not Cleansed 02/17/23 0800   Dressing/Treatment Pharmaceutical agent (see MAR); Moist to dry;ABD;Roll gauze 02/17/23 0400   Offloading for Diabetic Foot Ulcers Other (comment) 02/17/23 0400   Dressing Change Due 02/16/23 02/17/23 0800   Wound Length (cm) 2 cm 02/13/23 1013   Wound Width (cm) 1.5 cm 02/13/23 1013   Wound Depth (cm) 0.4 cm 02/13/23 1013   Wound Surface Area (cm^2) 3 cm^2 02/13/23 1013   Change in Wound Size % (l*w) 50 02/13/23 1013   Wound Volume (cm^3) 1.2 cm^3 02/13/23 1013   Wound Healing % 60 02/13/23 1013   Post-Procedure Length (cm) 3 cm 02/09/23 1214   Post-Procedure Width (cm) 2 cm 02/09/23 1214   Post-Procedure Depth (cm) 0.5 cm 02/09/23 1214   Post-Procedure Surface Area (cm^2) 6 cm^2 02/09/23 1214   Post-Procedure Volume (cm^3) 3 cm^3 02/09/23 1214   Distance Tunneling (cm) 0 cm 02/16/23 1649   Tunneling Position ___ O'Clock 0 02/16/23 1649   Undermining Starts ___ O'Clock 0 02/16/23 1649   Undermining Ends___ O'Clock 0 02/16/23 1649   Undermining Maxium Distance (cm) 0 02/16/23 1649   Wound Assessment Slough 02/17/23 0400   Drainage Amount Moderate 02/17/23 0400   Drainage Description Purulent 02/17/23 0400   Odor Malodorous/putrid 02/17/23 0400   Dina-wound Assessment Maceration 02/17/23 0400   Margins Defined edges 02/17/23 0400   Wound Thickness Description not for Pressure Injury Full thickness 02/17/23 0400   Number of days: 8       Wound 02/09/23 #8 Left 3rd toe (Active)   Wound Image   02/09/23 1126   Wound Etiology Diabetic 02/17/23 0800   Dressing Status Clean;Dry; Intact 02/17/23 0800   Wound Cleansed Not Cleansed 02/17/23 0800   Dressing/Treatment ABD;Roll gauze 02/17/23 0400   Offloading for Diabetic Foot Ulcers Other (comment) 02/17/23 0400   Dressing Change Due 02/16/23 02/17/23 0400   Wound Length (cm) 3 cm 02/13/23 1013   Wound Width (cm) 2 cm 02/13/23 1013   Wound Depth (cm) 0.2 cm 02/13/23 1013   Wound Surface Area (cm^2) 6 cm^2 02/13/23 1013   Change in Wound Size % (l*w) 57.14 02/13/23 1013   Wound Volume (cm^3) 1.2 cm^3 02/13/23 1013   Wound Healing % 14 02/13/23 1013   Post-Procedure Length (cm) 4 cm 02/09/23 1214   Post-Procedure Width (cm) 3.5 cm 02/09/23 1214   Post-Procedure Depth (cm) 0.1 cm 02/09/23 1214   Post-Procedure Surface Area (cm^2) 14 cm^2 02/09/23 1214   Post-Procedure Volume (cm^3) 1.4 cm^3 02/09/23 1214   Distance Tunneling (cm) 0 cm 02/16/23 1649   Tunneling Position ___ O'Clock 0 02/16/23 1649   Undermining Starts ___ O'Clock 0 02/16/23 1649   Undermining Ends___ O'Clock 0 02/16/23 1649   Undermining Maxium Distance (cm) 0 02/16/23 1649   Wound Assessment Dry 02/17/23 0800   Drainage Amount Moderate 02/17/23 0400   Drainage Description Serosanguinous; Yellow 02/17/23 0400   Odor Moderate 02/17/23 0400   Dina-wound Assessment Maceration 02/17/23 0400   Margins Defined edges 02/17/23 0400   Wound Thickness Description not for Pressure Injury Full thickness 02/17/23 0400   Number of days: 8       Wound 02/09/23 #9 Left 4th toe (Active)   Wound Image   02/09/23 1126   Wound Etiology Diabetic 02/17/23 0800   Dressing Status Clean;Dry; Intact 02/17/23 0800   Wound Cleansed Not Cleansed 02/17/23 0800   Dressing/Treatment ABD;Roll gauze 02/17/23 0400   Offloading for Diabetic Foot Ulcers Other (comment) 02/17/23 0800   Dressing Change Due 02/16/23 02/17/23 0800   Wound Length (cm) 2 cm 02/13/23 1013 Wound Width (cm) 1.3 cm 02/13/23 1013   Wound Depth (cm) 0.1 cm 02/13/23 1013   Wound Surface Area (cm^2) 2.6 cm^2 02/13/23 1013   Change in Wound Size % (l*w) 80.88 02/13/23 1013   Wound Volume (cm^3) 0.26 cm^3 02/13/23 1013   Wound Healing % 81 02/13/23 1013   Post-Procedure Length (cm) 4 cm 02/09/23 1214   Post-Procedure Width (cm) 3.4 cm 02/09/23 1214   Post-Procedure Depth (cm) 0.1 cm 02/09/23 1214   Post-Procedure Surface Area (cm^2) 13.6 cm^2 02/09/23 1214   Post-Procedure Volume (cm^3) 1.36 cm^3 02/09/23 1214   Distance Tunneling (cm) 0 cm 02/16/23 1649   Tunneling Position ___ O'Clock 0 02/16/23 1649   Undermining Starts ___ O'Clock 0 02/16/23 1649   Undermining Ends___ O'Clock 0 02/16/23 1649   Undermining Maxium Distance (cm) 0 02/16/23 1649   Wound Assessment Dry 02/17/23 0400   Drainage Amount Moderate 02/17/23 0400   Drainage Description Yellow 02/17/23 0400   Odor Mild 02/17/23 0400   Dina-wound Assessment Maceration 02/17/23 0400   Margins Defined edges 02/17/23 0400   Wound Thickness Description not for Pressure Injury Full thickness 02/17/23 0400   Number of days: 8       Wound 02/13/23 Knee Anterior; Left (Active)   Wound Image   02/13/23 1013   Wound Etiology Venous 02/17/23 0800   Dressing Status Dry; Intact 02/17/23 0800   Wound Cleansed Not Cleansed 02/17/23 0800   Dressing/Treatment Silicone border 11/27/01 0800   Wound Length (cm) 1 cm 02/13/23 1013   Wound Width (cm) 1.2 cm 02/13/23 1013   Wound Depth (cm) 0.1 cm 02/13/23 1013   Wound Surface Area (cm^2) 1.2 cm^2 02/13/23 1013   Wound Volume (cm^3) 0.12 cm^3 02/13/23 1013   Distance Tunneling (cm) 0 cm 02/16/23 1649   Tunneling Position ___ O'Clock 0 02/16/23 1649   Undermining Starts ___ O'Clock 0 02/16/23 1649   Undermining Ends___ O'Clock 0 02/16/23 1649   Undermining Maxium Distance (cm) 0 02/16/23 1649   Wound Assessment Eschar dry;Pink/red 02/17/23 0800   Drainage Amount None 02/17/23 0800   Odor None 02/17/23 0800   Dina-wound Assessment Intact 02/17/23 0800   Margins Defined edges 02/17/23 0800   Wound Thickness Description not for Pressure Injury Full thickness 02/17/23 0800   Number of days: 4       Wound 02/13/23 Right;Plantar heel (Active)   Wound Image   02/13/23 1013   Wound Etiology Diabetic 02/17/23 0800   Dressing Status Clean;Dry; Intact 02/17/23 0800   Wound Cleansed Not Cleansed 02/17/23 0800   Dressing/Treatment ABD;Roll gauze;Tape/Soft cloth adhesive tape; Pharmaceutical agent (see MAR) 02/17/23 0800   Offloading for Diabetic Foot Ulcers Other (comment) 02/17/23 0800   Dressing Change Due 02/16/23 02/17/23 0400   Wound Length (cm) 1.2 cm 02/13/23 1013   Wound Width (cm) 1.5 cm 02/13/23 1013   Wound Depth (cm) 0.2 cm 02/13/23 1013   Wound Surface Area (cm^2) 1.8 cm^2 02/13/23 1013   Wound Volume (cm^3) 0.36 cm^3 02/13/23 1013   Distance Tunneling (cm) 0 cm 02/16/23 1649   Tunneling Position ___ O'Clock 0 02/16/23 1649   Undermining Starts ___ O'Clock 0 02/16/23 1649   Undermining Ends___ O'Clock 0 02/16/23 1649   Undermining Maxium Distance (cm) 0 02/16/23 1649   Wound Assessment Pink/red 02/17/23 0400   Drainage Amount None 02/17/23 0400   Drainage Description Serosanguinous 02/17/23 0400   Odor None 02/17/23 0400   Dina-wound Assessment Intact 02/17/23 0400   Margins Defined edges 02/17/23 0400   Wound Thickness Description not for Pressure Injury Partial thickness 02/17/23 0400   Number of days: 4       Response to treatment:  Well tolerated by patient. Pain Assessment:  Severity:  none  Quality of pain: na  Wound Pain Timing/Severity: na  Premedicated: no    Plan:     Plan of Care: Wound 02/13/23 Knee Anterior; Left-Dressing/Treatment: Silicone border  [REMOVED] Wound 02/13/23 Toe (Comment  which one) Left 4th-Dressing/Treatment: Alginate with Ag, ABD, Roll gauze, Tape/Soft cloth adhesive tape  Wound 02/13/23 Right;Plantar heel-Dressing/Treatment: ABD, Roll gauze, Tape/Soft cloth adhesive tape, Pharmaceutical agent (see MAR)  Wound 02/09/23 #1 Right Knee-Dressing/Treatment: Silicone pad  Wound 04/70/67 #2 Right Medial Lower Leg Cluster-Dressing/Treatment: Pharmaceutical agent (see MAR), Moist to dry, ABD, Roll gauze  Wound 02/09/23 #3 Right Great toe amp site-Dressing/Treatment: Pharmaceutical agent (see MAR), Moist to dry, ABD, Roll gauze  Wound 02/09/23 #4 Right 4th toe-Dressing/Treatment: ABD, Roll gauze  Wound 02/09/23 #5 Left lateral lower leg cluster-Dressing/Treatment: Pharmaceutical agent (see MAR), Moist to dry, ABD, Roll gauze  Wound 02/09/23 #6 Left great toe amp site-Dressing/Treatment: Pharmaceutical agent (see MAR), Moist to dry, ABD, Roll gauze  Wound 02/09/23 #7 Left 2nd toe amp site-Dressing/Treatment: Pharmaceutical agent (see MAR), Moist to dry, ABD, Roll gauze  Wound 02/09/23 #8 Left 3rd toe-Dressing/Treatment: ABD, Roll gauze  Wound 02/09/23 #9 Left 4th toe-Dressing/Treatment: ABD, Roll gauze    Pt in bed. Intubated. Active with outpatient wound clinic and toe amputations per Dr. Dennie Britain in 2022. Seen with Jaylin HOPPER for wound reassessment. Dressings to bilateral feet and legs removed. Wounds with less slough and odor. Improved. Diabetic/venous/non healing surgical wounds noted. Recommend continue santyl, Dakin's wet to dry. Applied. Right and left knee with wounds which are improved. Possible venous/traumatic. Mostly dry. Silicone foam borders applied. Pictures and measurements taken. Sacrum intact with silicone foam border for prevention. Turned to left side. Updated nurse on recommendations. Pt is a high risk for skin breakdown AEB Pablo. Follow Pablo orders.      Specialty Bed Required : yes  [x] Low Air Loss   [x] Pressure Redistribution  [] Fluid Immersion  [] Bariatric  [] Total Pressure Relief  [] Other:     Discharge Plan:  Placement for patient upon discharge: tbd  Hospice Care: no  Patient appropriate for Outpatient [address]: active    Patient/Caregiver Teaching:  Level of patient/caregiver understanding able to:   Not able at this time.         Electronically signed by Dara Rodríguez RN, Yamile Pillai on 2/17/2023 at 11:50 AM

## 2023-02-17 NOTE — PROGRESS NOTES
Comprehensive Nutrition Assessment    Type and Reason for Visit:  Reassess    Nutrition Recommendations/Plan:   Recommend advance toward 75 ml/hr as goal rate to provide ~1800 kcals (additional 280 kcals from lipids of propofol) and 157 g protein     Malnutrition Assessment:  Malnutrition Status:  No malnutrition (02/13/23 1225)    Context:  Acute Illness       Nutrition Assessment:    Tolerates EN at 60 ml/hr, Vital HP formula running. Pt unable to extubate successfully, re-intubated, remains on vent and sedated. Pt had stopped CRRT during extubation, awaiting plans for renal. Low BP. No Pressors. Signficiant wounds. Weight trending downward on vent. Will follow as high nutrition risk. Nutrition Related Findings:    Glu 181, MAP 68 Wound Type: Multiple, Venous Stasis, Diabetic Ulcer       Current Nutrition Intake & Therapies:    Average Meal Intake: NPO  Average Supplements Intake: NPO  Diet NPO  ADULT TUBE FEEDING; Nasogastric; Peptide Based High Protein; Continuous; 20; Yes; 10; Q 4 hours; 65; 30; Q 4 hours; Protein; daily, via NGT  Current Tube Feeding (TF) Orders:  Feeding Route: Nasogastric  Formula: Peptide Based High Protein  Schedule: Continuous  Feeding Regimen: 60 per flowsheet  Additives/Modulars: None  Water Flushes: 30 q 4 H  Current TF & Flush Orders Provides: 60: 1440 kcals, 126 g protein  Goal TF & Flush Orders Provides: Recommend advance toward 75 ml/hr as goal rate to provide ~1800 kcals (additional 280 kcals from lipids of propofol) and 157 g protein  Additional Calorie Sources:  280 kcal from propofol    Anthropometric Measures:  Height: 6' (182.9 cm)  Ideal Body Weight (IBW): 178 lbs (81 kg)    Admission Body Weight: 299 lb 4.8 oz (135.8 kg)  Current Body Weight: 281 lb 12 oz (127.8 kg), 158.3 % IBW.  Weight Source: Bed Scale  Current BMI (kg/m2): 38.2  Usual Body Weight: 261 lb 7 oz (118.6 kg) (11/13/22)  % Weight Change (Calculated): 14.2  Weight Adjustment For: Amputation, No Adjustment (toe amputation)                 BMI Categories: Obese Class 2 (BMI 35.0 -39.9)    Estimated Daily Nutrient Needs:  Energy Requirements Based On: Formula  Weight Used for Energy Requirements: Current  Energy (kcal/day): 2161 (PSU 2010)  Weight Used for Protein Requirements: Ideal  Protein (g/day): 162-202 (2-2.5 g/kg IBW)  Method Used for Fluid Requirements: Standard Renal  Fluid (ml/day): per Nephrology    Nutrition Diagnosis:   Inadequate protein-energy intake related to impaired nutrient utilization, increase demand for energy/nutrients as evidenced by NPO or clear liquid status due to medical condition, intubation, wounds (s/p toe amputation)    Nutrition Interventions:   Food and/or Nutrient Delivery: Continue NPO, Modify Tube Feeding  Nutrition Education/Counseling: No recommendation at this time  Coordination of Nutrition Care: Continue to monitor while inpatient       Goals:  Previous Goal Met: Progressing toward Goal(s)  Goals: Tolerate nutrition support at goal rate       Nutrition Monitoring and Evaluation:   Behavioral-Environmental Outcomes: None Identified  Food/Nutrient Intake Outcomes: Enteral Nutrition Intake/Tolerance, IVF Intake  Physical Signs/Symptoms Outcomes: Biochemical Data, GI Status, Fluid Status or Edema, Hemodynamic Status, Skin, Weight    Discharge Planning:     Too soon to determine     Edvin Torres RD, LD  Contact: [phone]

## 2023-02-17 NOTE — PLAN OF CARE
Problem: Discharge Planning  Goal: Discharge to home or other facility with appropriate resources  Outcome: Progressing  Flowsheets  Taken 2/16/2023 2000 by Deepak Vincent RN  Discharge to home or other facility with appropriate resources:   Identify barriers to discharge with patient and caregiver   Arrange for needed discharge resources and transportation as appropriate   Identify discharge learning needs (meds, wound care, etc)  Taken 2/16/2023 1300 by Macario Campbell RN  Discharge to home or other facility with appropriate resources: Identify barriers to discharge with patient and caregiver  Taken 2/16/2023 0858 by Macario Campbell RN  Discharge to home or other facility with appropriate resources: Identify barriers to discharge with patient and caregiver     Problem: Pain  Goal: Verbalizes/displays adequate comfort level or baseline comfort level  Outcome: Progressing  Flowsheets  Taken 2/16/2023 2000 by Deepak Vincent RN  Verbalizes/displays adequate comfort level or baseline comfort level: Encourage patient to monitor pain and request assistance  Taken 2/16/2023 0801 by Macario Campbell RN  Verbalizes/displays adequate comfort level or baseline comfort level: Assess pain using appropriate pain scale     Problem: Confusion  Goal: Confusion, delirium, dementia, or psychosis is improved or at baseline  Description: INTERVENTIONS:  1. Assess for possible contributors to thought disturbance, including medications, impaired vision or hearing, underlying metabolic abnormalities, dehydration, psychiatric diagnoses, and notify attending LIP  2. Wyatt high risk fall precautions, as indicated  3. Provide frequent short contacts to provide reality reorientation, refocusing and direction  4. Decrease environmental stimuli, including noise as appropriate  5. Monitor and intervene to maintain adequate nutrition, hydration, elimination, sleep and activity  6.  If unable to ensure safety without constant attention obtain sitter and review sitter guidelines with assigned personnel  7.  Initiate Psychosocial CNS and Spiritual Care consult, as indicated  Outcome: Progressing  Flowsheets (Taken 2/16/2023 2000)  Effect of thought disturbance (confusion, delirium, dementia, or psychosis) are managed with adequate functional status: Assess for contributors to thought disturbance, including medications, impaired vision or hearing, underlying metabolic abnormalities, dehydration, psychiatric diagnoses, notify LIP     Problem: Chronic Conditions and Co-morbidities  Goal: Patient's chronic conditions and co-morbidity symptoms are monitored and maintained or improved  Outcome: Progressing  Flowsheets  Taken 2/16/2023 2000 by Dasha Garcia 34 - Patient's Chronic Conditions and Co-Morbidity Symptoms are Monitored and Maintained or Improved:   Monitor and assess patient's chronic conditions and comorbid symptoms for stability, deterioration, or improvement   Collaborate with multidisciplinary team to address chronic and comorbid conditions and prevent exacerbation or deterioration   Update acute care plan with appropriate goals if chronic or comorbid symptoms are exacerbated and prevent overall improvement and discharge  Taken 2/16/2023 1649 by Melody Torre RN  Care Plan - Patient's Chronic Conditions and Co-Morbidity Symptoms are Monitored and Maintained or Improved: Monitor and assess patient's chronic conditions and comorbid symptoms for stability, deterioration, or improvement  Taken 2/16/2023 1300 by Melody Torre RN  Care Plan - Patient's Chronic Conditions and Co-Morbidity Symptoms are Monitored and Maintained or Improved: Monitor and assess patient's chronic conditions and comorbid symptoms for stability, deterioration, or improvement  Taken 2/16/2023 0858 by Melody Torre RN  Care Plan - Patient's Chronic Conditions and Co-Morbidity Symptoms are Monitored and Maintained or Improved: Monitor and assess patient's chronic conditions and comorbid symptoms for stability, deterioration, or improvement     Problem: Nutrition Deficit:  Goal: Optimize nutritional status  Outcome: Progressing     Problem: Skin/Tissue Integrity  Goal: Absence of new skin breakdown  Description: 1. Monitor for areas of redness and/or skin breakdown  2. Assess vascular access sites hourly  3. Every 4-6 hours minimum:  Change oxygen saturation probe site  4. Every 4-6 hours:  If on nasal continuous positive airway pressure, respiratory therapy assess nares and determine need for appliance change or resting period.   Outcome: Progressing     Problem: Safety - Adult  Goal: Free from fall injury  Outcome: Progressing  Flowsheets (Taken 2/16/2023 2030)  Free From Fall Injury:   Instruct family/caregiver on patient safety   Based on caregiver fall risk screen, instruct family/caregiver to ask for assistance with transferring infant if caregiver noted to have fall risk factors     Problem: ABCDS Injury Assessment  Goal: Absence of physical injury  Outcome: Progressing  Flowsheets (Taken 2/16/2023 2030)  Absence of Physical Injury: Implement safety measures based on patient assessment

## 2023-02-17 NOTE — PROGRESS NOTES
General Surgery-[facility] Day: 7    ChiefComplaint on Admission: Multiorgan failure      Subjective:     Dusty Santos is a 67 y.o. male with multiorgan failure. Pt was extubated, but desaturated and briefly coded yesterday after reintubation. No pressor support at this time. On propofol. Tolerating Diet NPO  ADULT TUBE FEEDING; Nasogastric; Peptide Based High Protein; Continuous; 20; Yes; 10; Q 4 hours; 65; 30; Q 4 hours. - BM.     ROS:  Review of Systems   Unable to perform ROS: Intubated     Allergies  Pcn [penicillins] and Fentanyl          Diagnosis Date    Acid reflux     Acute MI ([facility] 2004, 2008    Arthritis     Back    Broken teeth     Upper Front    CAD (coronary artery disease)     Sees Dr. Raymond Velasquez [facility])     per old chart    Cerebral artery occlusion with cerebral infarction ([facility] [address]     CHF (congestive heart failure) ([facility] [address]     preserved ejection fraction    Chronic back pain     Chronic kidney disease     Stage IV - patient of Dr Carmita Day    Diabetes mellitus [facility]) Dx 1965    per old chart pt has been diabetic since age 13    Diabetic neuropathy ([facility] [address]     \"in my feet\"    H/O cardiovascular stress test 08/25/2016    H/O Doppler ultrasound 09/27/2018    Moderate disease of the right lower extremity with an JALEN of 0.72. Moderate to severe disease of the left lower extremity with an JALEN of 0.55.     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    Neuropathy     both feet    PAD (peripheral artery disease) ([facility] [address] 09/27/2018    PVD (peripheral vascular disease) ([facility] [address]     Sick sinus syndrome (HCC)     Sleep apnea \"sleep study 3 yrs ago- could not tolerate the cpap made me too dry\"    Spinal stenosis     Teeth missing     Upper And Lower    Type 2 diabetes mellitus without complication ([facility] [address]     WD-Chronic foot ulcer, left, with necrosis of bone ([facility] [address] 2021       Objective:     Vitals:    23 0900   BP: (!) 102/51   Pulse: 65   Resp: 21   Temp:    SpO2: 100%       TEMPERATURE:  Current - Temp: 98.2 °F (36.8 °C); Max - Temp  Av.4 °F (36.9 °C)  Min: 97 °F (36.1 °C)  Max: 100 °F (37.8 °C)    I/O this shift:  In: -   Out: 100 [Urine:100]I/O last 3 completed shifts: In: 3725.2 [I.V.:526.8; NG/GT:2492; IV Piggyback:706.4]  Out: 191 [Urine:315; Chest Tube:1600]      Physical Exam:    Physical Exam  Constitutional:       Interventions: He is sedated and intubated. Pulmonary:      Effort: He is intubated.    Musculoskeletal:        Feet:            Scheduled Meds:   cefepime  1,000 mg IntraVENous Q12H    linezolid  600 mg IntraVENous Q12H    famotidine  20 mg Per NG tube Daily    heparin (porcine)  5,000 Units SubCUTAneous 3 times per day    sodium hypochlorite   Irrigation Daily    collagenase   Topical Daily    sodium chloride flush  5-40 mL IntraVENous 2 times per day    aspirin  81 mg Oral Daily    [Held by provider] clopidogrel  75 mg Oral Daily    ipratropium-albuterol  1 ampule Inhalation Q4H WA    chlorhexidine  15 mL Mouth/Throat BID     Continuous Infusions:   dextrose      sodium chloride      propofol 15 mcg/kg/min (23 0937)     PRN Meds:heparin flush, fentanNYL, midazolam, glucose, dextrose bolus **OR** dextrose bolus, glucagon (rDNA), dextrose, sodium chloride flush, sodium chloride      Labs/Imaging Results:   Lab Results   Component Value Date    WBC 6.4 2023    HGB 7.4 (L) 2023    HCT 24.4 (L) 2023    MCV 84.7 2023     (L) 2023     Lab Results   Component Value Date     2023    K 4.0 2023     2023    CO2 27 2023    BUN 26 (H) 02/17/2023    CREATININE 3.0 (H) 02/17/2023    GLUCOSE 181 (H) 02/17/2023    CALCIUM 7.6 (L) 02/17/2023    PROT 5.3 (L) 02/17/2023    LABALBU 2.4 (L) 02/17/2023    BILITOT 0.8 02/17/2023    ALKPHOS 74 02/17/2023    AST 14 (L) 02/17/2023    ALT 6 (L) 02/17/2023    LABGLOM 21 (L) 02/17/2023    GFRAA 25 (L) 10/17/2022       Assessment:     Patient Active Problem List:     PAD (peripheral artery disease) ([facility])     Chronic coronary artery disease     Biventricular ICD (implantable cardioverter-defibrillator) in place     Chronic combined systolic and diastolic heart failure ([facility])     Chronic kidney disease, stage III (moderate) ([facility])     Mixed hyperlipidemia     Sick sinus syndrome ([facility])     Type 2 diabetes mellitus with diabetic polyneuropathy ([facility] [address]     Spinal stenosis of lumbar region     Obesity, Class I, BMI 30-34.9     S/P partial colectomy     Tubulovillous adenoma of colon     Microalbuminuria     WD-PVD (peripheral vascular disease) ([facility])     Limb ischemia     Necrotic toes ([facility])     Toe gangrene ([facility])     Diabetic foot infection ([facility] [address]     Chronic kidney disease (CKD) stage G3a/A2, moderately decreased glomerular filtration rate (GFR) between 45-59 mL/min/1.73 square meter and albuminuria creatinine ratio between  mg/g ([facility])     Edema     ICD (implantable cardioverter-defibrillator) battery depletion     Hyperkalemia     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[facility])     Ischemia of toe     Acute kidney injury ([facility] [address]     Fluid overload     DM (diabetes mellitus) ([facility])     Precordial pain     Acute chest pain     Unstable angina ([facility])     Chronic kidney disease (CKD) stage G3a/A3, moderately decreased glomerular filtration rate (GFR) between 45-59 mL/min/1.73 square meter and albuminuria creatinine ratio greater than 300 mg/g ([facility])     Cardiomyopathy ([facility])     Diabetic neuropathy ([facility])     HTN (hypertension)     Epigastric pain     Acute on chronic congestive heart failure (HCC)     Leg edema     Acute on chronic systolic CHF (congestive heart failure) (HCC)     Diabetic foot ulcer with osteomyelitis ([address]     Visit for wound check     Moderate malnutrition ([address]     Long term (current) use of antibiotics     WD-Diabetic ulcer of toe of right foot associated with type 2 diabetes mellitus, with fat layer exposed ([address]     Diabetic ulcer of toe associated with type 2 diabetes mellitus, with bone involvement without evidence of necrosis (HCC)     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     Persistent wound pain     Receiving intravenous antibiotic treatment as outpatient     Acute on chronic respiratory failure with hypoxemia ([address]     WD-Chronic foot ulcer, left, with necrosis of bone (HCC)     Acute on chronic HFrEF (heart failure with reduced ejection fraction) (HCC)     Scrotal edema     Hypertensive urgency     Diabetic ulcer of right foot due to type 2 diabetes mellitus ([address]     Cellulitis of foot     Toe osteomyelitis (HCC)     Heart failure exacerbated by sotalol (HCC)     CHF (congestive heart failure), NYHA class I, acute on chronic, combined (HCC)     Acute on chronic diastolic CHF (congestive heart failure) (HCC)     Leg swelling     Acute on chronic diastolic heart failure (HCC)     Skin ulcer of left foot including toes with fat layer exposed ([address]     Superficial incisional surgical site infection     Bacteremia due to Enterococcus     Acute respiratory failure with hypoxia and hypercapnia (HCC)     Acute kidney injury superimposed on CKD ([address]     Diabetic foot ([address]     Diabetic ulcer of midfoot associated with diabetes mellitus due to underlying condition, with muscle involvement without evidence of necrosis ([address]     Other seizures ([address]      Plan:      Wounds have improved. Continue local wound care to bilateral feet with dakins and santyl.       Jessy Chowdary PA-C

## 2023-02-17 NOTE — PROGRESS NOTES
V2.0  [facility] Critical Care Progress Note      Name:  Lolita Kessler /Age/Sex: 1950  (67 y.o. male)   MRN & CSN:  [id number] & [id number] Encounter Date/Time: 2023 4:09 PM EST    Location:  -A PCP: Kameron Isaac Day: 7    Assessment and Plan:   Lolita Kessler is a 67 y.o. male who presents with Acute respiratory failure with hypoxia and hypercapnia (HCC)    Acute on chronic renal failure  Acute respiratory failure, requiring intubation  Hyperkalemia-resolved  Acute metabolic encephalopathy  Chronic LE wounds   HTN  CAD  Heart failure with preserved ejection fraction     Plan:     Neuro - sedated on propofol, patient reportedly agitated when sedation is lightened, however does not follow commands. EEG obtained-no electrographic seizures/NCSE    CV - acute on chronic HF. EF 50-55% in . Resp - acute hypoxic hypercapneic resp failure. Intubated, SAT/SBT trials when appropriate. Sedated on propofol. Checks x-ray reviewed this morning. Continue DuoNebs, pulmonary hygiene. Sputum culture pending. GI - Tolerating tube feed. GI prophylaxis      - NANCY on CKD 3/. Baseline Cr 2.2. adm Cr 4.8. sCr now 3.0; CRRT has been stopped. Nephrology following. Plans for Lasix stress test.  Urology consulted for difficult parkinson; Distal urethral stricture with incomplete bladder emptying-cystoscopy/TRUS as outpatient if voiding issues persist, voiding trial prior to discharge     ID -Bilateral lower extremity wounds; wound cultures on -positive for Alcaligenes faecalis, Staph aureus MRSA, Finegoldia magna. Antibiotics changed to cefepime and Zyvox continue. White blood count stable at 6.4 today. Heme -hemoglobin-stable. 7.5 today. MSK - LE wounds bilaterally. Wound care consult. Wet-dry for now with xeroform. Antibiotics as above.   Diet Diet NPO  ADULT TUBE FEEDING; Nasogastric; Peptide Based High Protein; Continuous; 20; Yes; 10; Q 4 hours; 65; 30; Q 4 hours DVT Prophylaxis [] Lovenox, [x]  Heparin, [] SCDs, [] Ambulation,  [] Eliquis, [] Xarelto  [] Coumadin   Code Status Full Code   Disposition From: Home  Expected Disposition: TBD  Estimated Date of Discharge: TBD  Patient requires continued admission due to respiratory failure   Surrogate Decision Maker/ POA Child     Subjective:      Patient seen and examined this morning. Currently remains critically ill on the ventilator. He is currently being sedated on propofol. He is unresponsive upon my assessment. He does not follow commands or open his eyes. There is no family at the bedside. Review of systems impossible this time given mental status. Plan of care discussed with bedside RN. Review of Systems:    Review of Systems    Unable to complete secondary to intubation    Objective: Intake/Output Summary (Last 24 hours) at 2/17/2023 1237  Last data filed at 2/17/2023 3072  Gross per 24 hour   Intake 2378.1 ml   Output 1870 ml   Net 508.1 ml          Vitals:   Vitals:    02/17/23 1203   BP:    Pulse: 62   Resp: 19   Temp:    SpO2: 100%       Physical Exam:     General: Ill-appearing male, NAD  Eyes: EOMI  ENT: neck supple, ETT, NGT  Cardiovascular: Regular rate. Respiratory: Scattered rhonchi, no wheezing. On ventilator. Gastrointestinal: Obese, soft, non tender  Genitourinary: no suprapubic tenderness, Fuller catheter in place.   Musculoskeletal: 2+ lower extremity edema, chronic wounds on bilateral lower extremities- dressing clean/dry/intact  Skin: warm, dry  Neuro: Currently ventilator, sedated, not following commands, cranial nerves grossly intact  psych: Unable to assess    Medications:   Medications:    cefepime  1,000 mg IntraVENous Q12H    linezolid  600 mg IntraVENous Q12H    famotidine  20 mg Per NG tube Daily    heparin (porcine)  5,000 Units SubCUTAneous 3 times per day    sodium hypochlorite   Irrigation Daily    collagenase   Topical Daily    sodium chloride flush  5-40 mL IntraVENous 2 times per day    aspirin  81 mg Oral Daily    [Held by provider] clopidogrel  75 mg Oral Daily    ipratropium-albuterol  1 ampule Inhalation Q4H WA    chlorhexidine  15 mL Mouth/Throat BID      Infusions:    dextrose      sodium chloride      propofol 15 mcg/kg/min (02/17/23 0937)     PRN Meds: heparin flush, 240 Units, PRN  fentanNYL, 50 mcg, Q1H PRN  midazolam, 1 mg, Q2H PRN  glucose, 4 tablet, PRN  dextrose bolus, 125 mL, PRN   Or  dextrose bolus, 250 mL, PRN  glucagon (rDNA), 1 mg, PRN  dextrose, , Continuous PRN  sodium chloride flush, 10 mL, PRN  sodium chloride, , PRN      Labs      Recent Results (from the past 24 hour(s))   POCT Glucose    Collection Time: 02/16/23  4:22 PM   Result Value Ref Range    POC Glucose 209 (H) 70 - 99 MG/DL   POCT Glucose    Collection Time: 02/16/23  7:34 PM   Result Value Ref Range    POC Glucose 181 (H) 70 - 99 MG/DL   POCT Glucose    Collection Time: 02/16/23 11:04 PM   Result Value Ref Range    POC Glucose 179 (H) 70 - 99 MG/DL   CBC with Auto Differential    Collection Time: 02/17/23  3:20 AM   Result Value Ref Range    WBC 6.4 4.0 - 10.5 K/CU MM    RBC 2.88 (L) 4.6 - 6.2 M/CU MM    Hemoglobin 7.4 (L) 13.5 - 18.0 GM/DL    Hematocrit 24.4 (L) 42 - 52 %    MCV 84.7 78 - 100 FL    MCH 25.7 (L) 27 - 31 PG    MCHC 30.3 (L) 32.0 - 36.0 %    RDW 19.3 (H) 11.7 - 14.9 %    Platelets 345 (L) 344 - 440 K/CU MM    MPV 10.2 7.5 - 11.1 FL    Differential Type AUTOMATED DIFFERENTIAL     Segs Relative 76.3 (H) 36 - 66 %    Lymphocytes % 13.8 (L) 24 - 44 %    Monocytes % 8.2 (H) 0 - 4 %    Eosinophils % 0.9 0 - 3 %    Basophils % 0.3 0 - 1 %    Segs Absolute 4.9 K/CU MM    Lymphocytes Absolute 0.9 K/CU MM    Monocytes Absolute 0.5 K/CU MM    Eosinophils Absolute 0.1 K/CU MM    Basophils Absolute 0.0 K/CU MM    Nucleated RBC % 0.0 %    Total Nucleated RBC 0.0 K/CU MM    Total Immature Neutrophil 0.03 K/CU MM    Immature Neutrophil % 0.5 (H) 0 - 0.43 %   Calcium, Ionized    Collection Time: 02/17/23  3:20 AM   Result Value Ref Range    Ionized Ca 1.13 1.12 - 1.32 mMOL/L    Calcium, Ionized 4.52 4.48 - 5.28 MG/DL   Comprehensive Metabolic Panel w/ Reflex to MG    Collection Time: 02/17/23  3:20 AM   Result Value Ref Range    Sodium 135 135 - 145 MMOL/L    Potassium 4.0 3.5 - 5.1 MMOL/L    Chloride 101 99 - 110 mMol/L    CO2 27 21 - 32 MMOL/L    BUN 26 (H) 6 - 23 MG/DL    Creatinine 3.0 (H) 0.9 - 1.3 MG/DL    Est, Glom Filt Rate 21 (L) >60 mL/min/1.73m2    Glucose 181 (H) 70 - 99 MG/DL    Calcium 7.6 (L) 8.3 - 10.6 MG/DL    Albumin 2.4 (L) 3.4 - 5.0 GM/DL    Total Protein 5.3 (L) 6.4 - 8.2 GM/DL    Total Bilirubin 0.8 0.0 - 1.0 MG/DL    ALT 6 (L) 10 - 40 U/L    AST 14 (L) 15 - 37 IU/L    Alkaline Phosphatase 74 40 - 128 IU/L    Anion Gap 7 4 - 16   Magnesium    Collection Time: 02/17/23  3:20 AM   Result Value Ref Range    Magnesium 1.9 1.8 - 2.4 mg/dl   Phosphorus    Collection Time: 02/17/23  3:20 AM   Result Value Ref Range    Phosphorus 2.7 2.5 - 4.9 MG/DL   POCT Glucose    Collection Time: 02/17/23  3:29 AM   Result Value Ref Range    POC Glucose 177 (H) 70 - 99 MG/DL   POCT Glucose    Collection Time: 02/17/23  6:26 AM   Result Value Ref Range    POC Glucose 187 (H) 70 - 99 MG/DL        Imaging/Diagnostics Last 24 Hours   XR CHEST PORTABLE    Result Date: 2/11/2023  EXAMINATION: ONE XRAY VIEW OF THE CHEST 2/11/2023 7:29 pm COMPARISON: Chest radiograph 02/11/2023 HISTORY: ORDERING SYSTEM PROVIDED HISTORY: ETT placement TECHNOLOGIST PROVIDED HISTORY: Reason for exam:->ETT placement Reason for Exam: et and og tube placement confirmation Additional signs and symptoms: et and og tube placement confrimation FINDINGS: Lines and tubes: -ETT terminates at the superior margin of the clavicles. -enteric tube takes a the subdiaphragmatic course and terminates out of the field of view -right-sided Cordis catheter, which terminates within the mid/upper SVC Lungs:  There is indistinctness of the pulmonary vasculature with patchy opacities within the right greater than left lungs. . Pleura: Small right-sided pleural effusion. Cardiomediastinal silhouette: Enlarged cardiac silhouette. Bones: No acute osseous findings. Soft tissues: Left-sided 2 lead cardiac device. Lines and tubes as above. The ETT terminates at the level of the superior margin of the clavicles. Grossly unchanged pulmonary exam.  Findings favor cardiogenic pulmonary edema. However a superimposed infectious process cannot be excluded. I personally saw the patient and independently provided 38 minutes of non-concurrent critical care out of the total shared critical care time provided.     Electronically signed by MICKY Starkey CNP on 2/17/2023 at 12:37 PM

## 2023-02-18 ENCOUNTER — APPOINTMENT (OUTPATIENT)
Dept: GENERAL RADIOLOGY | Age: 73
DRG: 981 | End: 2023-02-18
Payer: MEDICARE

## 2023-02-18 LAB
ALBUMIN SERPL-MCNC: 2.3 GM/DL (ref 3.4–5)
ALP BLD-CCNC: 73 IU/L (ref 40–128)
ALT SERPL-CCNC: 6 U/L (ref 10–40)
ANION GAP SERPL CALCULATED.3IONS-SCNC: 8 MMOL/L (ref 4–16)
AST SERPL-CCNC: 14 IU/L (ref 15–37)
BASOPHILS ABSOLUTE: 0 K/CU MM
BASOPHILS RELATIVE PERCENT: 0.4 % (ref 0–1)
BILIRUB SERPL-MCNC: 0.8 MG/DL (ref 0–1)
BUN SERPL-MCNC: 37 MG/DL (ref 6–23)
CALCIUM IONIZED: 4.4 MG/DL (ref 4.48–5.28)
CALCIUM SERPL-MCNC: 7.4 MG/DL (ref 8.3–10.6)
CHLORIDE BLD-SCNC: 99 MMOL/L (ref 99–110)
CO2: 26 MMOL/L (ref 21–32)
CREAT SERPL-MCNC: 3.1 MG/DL (ref 0.9–1.3)
CULTURE: ABNORMAL
DIFFERENTIAL TYPE: ABNORMAL
EOSINOPHILS ABSOLUTE: 0.1 K/CU MM
EOSINOPHILS RELATIVE PERCENT: 1.5 % (ref 0–3)
GFR SERPL CREATININE-BSD FRML MDRD: 21 ML/MIN/1.73M2
GLUCOSE BLD-MCNC: 200 MG/DL (ref 70–99)
GLUCOSE BLD-MCNC: 216 MG/DL (ref 70–99)
GLUCOSE BLD-MCNC: 224 MG/DL (ref 70–99)
GLUCOSE BLD-MCNC: 242 MG/DL (ref 70–99)
GLUCOSE SERPL-MCNC: 200 MG/DL (ref 70–99)
HCT VFR BLD CALC: 25.2 % (ref 42–52)
HEMOGLOBIN: 7.4 GM/DL (ref 13.5–18)
IMMATURE NEUTROPHIL %: 0.3 % (ref 0–0.43)
IONIZED CA: 1.1 MMOL/L (ref 1.12–1.32)
LYMPHOCYTES ABSOLUTE: 0.7 K/CU MM
LYMPHOCYTES RELATIVE PERCENT: 9.4 % (ref 24–44)
Lab: ABNORMAL
MAGNESIUM: 1.9 MG/DL (ref 1.8–2.4)
MCH RBC QN AUTO: 25.3 PG (ref 27–31)
MCHC RBC AUTO-ENTMCNC: 29.4 % (ref 32–36)
MCV RBC AUTO: 86.3 FL (ref 78–100)
MONOCYTES ABSOLUTE: 0.6 K/CU MM
MONOCYTES RELATIVE PERCENT: 8.3 % (ref 0–4)
NUCLEATED RBC %: 0.3 %
PDW BLD-RTO: 19.7 % (ref 11.7–14.9)
PHOSPHORUS: 3.2 MG/DL (ref 2.5–4.9)
PLATELET # BLD: 126 K/CU MM (ref 140–440)
PMV BLD AUTO: 10.5 FL (ref 7.5–11.1)
POTASSIUM SERPL-SCNC: 4.2 MMOL/L (ref 3.5–5.1)
RBC # BLD: 2.92 M/CU MM (ref 4.6–6.2)
SEGMENTED NEUTROPHILS ABSOLUTE COUNT: 5.9 K/CU MM
SEGMENTED NEUTROPHILS RELATIVE PERCENT: 80.1 % (ref 36–66)
SODIUM BLD-SCNC: 133 MMOL/L (ref 135–145)
SPECIMEN: ABNORMAL
TOTAL IMMATURE NEUTOROPHIL: 0.02 K/CU MM
TOTAL NUCLEATED RBC: 0 K/CU MM
TOTAL PROTEIN: 5.5 GM/DL (ref 6.4–8.2)
WBC # BLD: 7.4 K/CU MM (ref 4–10.5)

## 2023-02-18 PROCEDURE — 82962 GLUCOSE BLOOD TEST: CPT

## 2023-02-18 PROCEDURE — 2700000000 HC OXYGEN THERAPY PER DAY

## 2023-02-18 PROCEDURE — 6370000000 HC RX 637 (ALT 250 FOR IP): Performed by: NURSE PRACTITIONER

## 2023-02-18 PROCEDURE — 94799 UNLISTED PULMONARY SVC/PX: CPT

## 2023-02-18 PROCEDURE — 6360000002 HC RX W HCPCS: Performed by: SPECIALIST

## 2023-02-18 PROCEDURE — 94640 AIRWAY INHALATION TREATMENT: CPT

## 2023-02-18 PROCEDURE — 6360000002 HC RX W HCPCS: Performed by: NURSE PRACTITIONER

## 2023-02-18 PROCEDURE — 2580000003 HC RX 258: Performed by: STUDENT IN AN ORGANIZED HEALTH CARE EDUCATION/TRAINING PROGRAM

## 2023-02-18 PROCEDURE — 2500000003 HC RX 250 WO HCPCS: Performed by: NURSE PRACTITIONER

## 2023-02-18 PROCEDURE — 2000000000 HC ICU R&B

## 2023-02-18 PROCEDURE — 6360000002 HC RX W HCPCS: Performed by: INTERNAL MEDICINE

## 2023-02-18 PROCEDURE — 2580000003 HC RX 258: Performed by: SPECIALIST

## 2023-02-18 PROCEDURE — 89220 SPUTUM SPECIMEN COLLECTION: CPT

## 2023-02-18 PROCEDURE — 2580000003 HC RX 258: Performed by: INTERNAL MEDICINE

## 2023-02-18 PROCEDURE — 83735 ASSAY OF MAGNESIUM: CPT

## 2023-02-18 PROCEDURE — 71045 X-RAY EXAM CHEST 1 VIEW: CPT

## 2023-02-18 PROCEDURE — 80053 COMPREHEN METABOLIC PANEL: CPT

## 2023-02-18 PROCEDURE — 2580000003 HC RX 258: Performed by: NURSE PRACTITIONER

## 2023-02-18 PROCEDURE — 6360000002 HC RX W HCPCS: Performed by: STUDENT IN AN ORGANIZED HEALTH CARE EDUCATION/TRAINING PROGRAM

## 2023-02-18 PROCEDURE — 84100 ASSAY OF PHOSPHORUS: CPT

## 2023-02-18 PROCEDURE — 94761 N-INVAS EAR/PLS OXIMETRY MLT: CPT

## 2023-02-18 PROCEDURE — 85025 COMPLETE CBC W/AUTO DIFF WBC: CPT

## 2023-02-18 PROCEDURE — 82330 ASSAY OF CALCIUM: CPT

## 2023-02-18 RX ORDER — ALBUTEROL SULFATE 2.5 MG/3ML
2.5 SOLUTION RESPIRATORY (INHALATION)
Status: DISCONTINUED | OUTPATIENT
Start: 2023-02-18 | End: 2023-03-07 | Stop reason: HOSPADM

## 2023-02-18 RX ORDER — SODIUM CHLORIDE FOR INHALATION 3 %
4 VIAL, NEBULIZER (ML) INHALATION PRN
Status: DISCONTINUED | OUTPATIENT
Start: 2023-02-18 | End: 2023-02-18

## 2023-02-18 RX ORDER — FUROSEMIDE 10 MG/ML
80 INJECTION INTRAMUSCULAR; INTRAVENOUS 3 TIMES DAILY
Status: DISCONTINUED | OUTPATIENT
Start: 2023-02-18 | End: 2023-02-26

## 2023-02-18 RX ORDER — DEXMEDETOMIDINE HYDROCHLORIDE 4 UG/ML
.1-1.5 INJECTION, SOLUTION INTRAVENOUS CONTINUOUS
Status: DISCONTINUED | OUTPATIENT
Start: 2023-02-18 | End: 2023-02-21

## 2023-02-18 RX ORDER — SODIUM CHLORIDE FOR INHALATION 3 %
4 VIAL, NEBULIZER (ML) INHALATION EVERY 4 HOURS
Status: DISCONTINUED | OUTPATIENT
Start: 2023-02-18 | End: 2023-02-22

## 2023-02-18 RX ADMIN — HEPARIN SODIUM 5000 UNITS: 5000 INJECTION INTRAVENOUS; SUBCUTANEOUS at 13:44

## 2023-02-18 RX ADMIN — CEFEPIME HYDROCHLORIDE 1000 MG: 1 INJECTION, POWDER, FOR SOLUTION INTRAMUSCULAR; INTRAVENOUS at 23:30

## 2023-02-18 RX ADMIN — CHLORHEXIDINE GLUCONATE 0.12% ORAL RINSE 15 ML: 1.2 LIQUID ORAL at 20:09

## 2023-02-18 RX ADMIN — FUROSEMIDE 80 MG: 10 INJECTION, SOLUTION INTRAMUSCULAR; INTRAVENOUS at 13:43

## 2023-02-18 RX ADMIN — HEPARIN SODIUM 5000 UNITS: 5000 INJECTION INTRAVENOUS; SUBCUTANEOUS at 05:42

## 2023-02-18 RX ADMIN — CHLOROTHIAZIDE SODIUM 500 MG: 500 INJECTION, POWDER, LYOPHILIZED, FOR SOLUTION INTRAVENOUS at 07:01

## 2023-02-18 RX ADMIN — Medication 4 ML: at 15:15

## 2023-02-18 RX ADMIN — FAMOTIDINE 20 MG: 20 TABLET ORAL at 08:49

## 2023-02-18 RX ADMIN — ASPIRIN 81 MG CHEWABLE TABLET 81 MG: 81 TABLET CHEWABLE at 08:49

## 2023-02-18 RX ADMIN — CHLOROTHIAZIDE SODIUM 500 MG: 500 INJECTION, POWDER, LYOPHILIZED, FOR SOLUTION INTRAVENOUS at 18:22

## 2023-02-18 RX ADMIN — SODIUM CHLORIDE, PRESERVATIVE FREE 10 ML: 5 INJECTION INTRAVENOUS at 08:49

## 2023-02-18 RX ADMIN — SODIUM CHLORIDE 25 ML: 9 INJECTION, SOLUTION INTRAVENOUS at 13:50

## 2023-02-18 RX ADMIN — HEPARIN SODIUM 5000 UNITS: 5000 INJECTION INTRAVENOUS; SUBCUTANEOUS at 21:39

## 2023-02-18 RX ADMIN — CHLORHEXIDINE GLUCONATE 0.12% ORAL RINSE 15 ML: 1.2 LIQUID ORAL at 08:49

## 2023-02-18 RX ADMIN — Medication 4 ML: at 11:24

## 2023-02-18 RX ADMIN — IPRATROPIUM BROMIDE AND ALBUTEROL SULFATE 1 AMPULE: 2.5; .5 SOLUTION RESPIRATORY (INHALATION) at 07:11

## 2023-02-18 RX ADMIN — IPRATROPIUM BROMIDE AND ALBUTEROL SULFATE 1 AMPULE: 2.5; .5 SOLUTION RESPIRATORY (INHALATION) at 19:53

## 2023-02-18 RX ADMIN — LINEZOLID 600 MG: 600 INJECTION, SOLUTION INTRAVENOUS at 14:08

## 2023-02-18 RX ADMIN — FUROSEMIDE 80 MG: 10 INJECTION, SOLUTION INTRAMUSCULAR; INTRAVENOUS at 20:09

## 2023-02-18 RX ADMIN — IPRATROPIUM BROMIDE AND ALBUTEROL SULFATE 1 AMPULE: 2.5; .5 SOLUTION RESPIRATORY (INHALATION) at 11:23

## 2023-02-18 RX ADMIN — DEXMEDETOMIDINE HYDROCHLORIDE 0.4 MCG/KG/HR: 4 INJECTION, SOLUTION INTRAVENOUS at 23:32

## 2023-02-18 RX ADMIN — SODIUM HYPOCHLORITE: 1.25 SOLUTION TOPICAL at 09:14

## 2023-02-18 RX ADMIN — CEFEPIME HYDROCHLORIDE 1000 MG: 1 INJECTION, POWDER, FOR SOLUTION INTRAMUSCULAR; INTRAVENOUS at 14:05

## 2023-02-18 RX ADMIN — SODIUM CHLORIDE, PRESERVATIVE FREE 10 ML: 5 INJECTION INTRAVENOUS at 20:10

## 2023-02-18 RX ADMIN — PROPOFOL 20 MCG/KG/MIN: 10 INJECTION, EMULSION INTRAVENOUS at 06:14

## 2023-02-18 RX ADMIN — FUROSEMIDE 80 MG: 10 INJECTION, SOLUTION INTRAMUSCULAR; INTRAVENOUS at 08:49

## 2023-02-18 RX ADMIN — Medication 4 ML: at 19:54

## 2023-02-18 RX ADMIN — Medication 4 ML: at 07:10

## 2023-02-18 RX ADMIN — COLLAGENASE SANTYL: 250 OINTMENT TOPICAL at 09:14

## 2023-02-18 RX ADMIN — LINEZOLID 600 MG: 600 INJECTION, SOLUTION INTRAVENOUS at 23:28

## 2023-02-18 RX ADMIN — IPRATROPIUM BROMIDE AND ALBUTEROL SULFATE 1 AMPULE: 2.5; .5 SOLUTION RESPIRATORY (INHALATION) at 15:14

## 2023-02-18 RX ADMIN — Medication 4 ML: at 23:17

## 2023-02-18 RX ADMIN — Medication 4 ML: at 05:55

## 2023-02-18 RX ADMIN — ALBUTEROL SULFATE 2.5 MG: 2.5 SOLUTION RESPIRATORY (INHALATION) at 05:55

## 2023-02-18 ASSESSMENT — PULMONARY FUNCTION TESTS
PIF_VALUE: 20
PIF_VALUE: 21
PIF_VALUE: 20
PIF_VALUE: 11
PIF_VALUE: 11
PIF_VALUE: 20
PIF_VALUE: 20
PIF_VALUE: 11
PIF_VALUE: 11
PIF_VALUE: 21
PIF_VALUE: 20
PIF_VALUE: 11
PIF_VALUE: 15
PIF_VALUE: 11
PIF_VALUE: 22
PIF_VALUE: 20
PIF_VALUE: 20
PIF_VALUE: 10
PIF_VALUE: 11
PIF_VALUE: 11
PIF_VALUE: 20
PIF_VALUE: 20
PIF_VALUE: 10
PIF_VALUE: 11
PIF_VALUE: 11
PIF_VALUE: 20
PIF_VALUE: 11
PIF_VALUE: 11
PIF_VALUE: 21
PIF_VALUE: 20
PIF_VALUE: 11
PIF_VALUE: 11

## 2023-02-18 ASSESSMENT — PAIN SCALES - GENERAL
PAINLEVEL_OUTOF10: 0

## 2023-02-18 NOTE — PLAN OF CARE
Problem: Discharge Planning  Goal: Discharge to home or other facility with appropriate resources  Outcome: Progressing  Flowsheets  Taken 2/17/2023 1607 by Sarah Arroyo RN  Discharge to home or other facility with appropriate resources: Identify barriers to discharge with patient and caregiver  Taken 2/17/2023 0800 by Kristine Acosta RN  Discharge to home or other facility with appropriate resources: Identify barriers to discharge with patient and caregiver     Problem: Pain  Goal: Verbalizes/displays adequate comfort level or baseline comfort level  Outcome: Progressing  Flowsheets  Taken 2/17/2023 2000 by Naseem Henriquez RN  Verbalizes/displays adequate comfort level or baseline comfort level:   Assess pain using appropriate pain scale   Administer analgesics based on type and severity of pain and evaluate response   Implement non-pharmacological measures as appropriate and evaluate response   Consider cultural and social influences on pain and pain management   Notify Licensed Independent Practitioner if interventions unsuccessful or patient reports new pain  Taken 2/17/2023 1200 by Sarah Arroyo RN  Verbalizes/displays adequate comfort level or baseline comfort level: Assess pain using appropriate pain scale     Problem: Confusion  Goal: Confusion, delirium, dementia, or psychosis is improved or at baseline  Description: INTERVENTIONS:  1. Assess for possible contributors to thought disturbance, including medications, impaired vision or hearing, underlying metabolic abnormalities, dehydration, psychiatric diagnoses, and notify attending LIP  2. Dolphin high risk fall precautions, as indicated  3. Provide frequent short contacts to provide reality reorientation, refocusing and direction  4. Decrease environmental stimuli, including noise as appropriate  5. Monitor and intervene to maintain adequate nutrition, hydration, elimination, sleep and activity  6.  If unable to ensure safety without constant attention obtain sitter and review sitter guidelines with assigned personnel  7. Initiate Psychosocial CNS and Spiritual Care consult, as indicated  Outcome: Progressing  Flowsheets (Taken 2/17/2023 2000)  Effect of thought disturbance (confusion, delirium, dementia, or psychosis) are managed with adequate functional status: Assess for contributors to thought disturbance, including medications, impaired vision or hearing, underlying metabolic abnormalities, dehydration, psychiatric diagnoses, notify LIP     Problem: Chronic Conditions and Co-morbidities  Goal: Patient's chronic conditions and co-morbidity symptoms are monitored and maintained or improved  Outcome: Progressing  Flowsheets  Taken 2/17/2023 1607 by Tod Delatorre RN  Care Plan - Patient's Chronic Conditions and Co-Morbidity Symptoms are Monitored and Maintained or Improved: Monitor and assess patient's chronic conditions and comorbid symptoms for stability, deterioration, or improvement  Taken 2/17/2023 0800 by Nayana Fuentes RN  Care Plan - Patient's Chronic Conditions and Co-Morbidity Symptoms are Monitored and Maintained or Improved: Monitor and assess patient's chronic conditions and comorbid symptoms for stability, deterioration, or improvement     Problem: Nutrition Deficit:  Goal: Optimize nutritional status  Outcome: Progressing  Flowsheets (Taken 2/17/2023 0744 by Dain Carrillo, RD, LD)  Nutrient intake appropriate for improving, restoring, or maintaining nutritional needs: Recommend, monitor, and adjust tube feedings and TPN/PPN based on assessed needs     Problem: Skin/Tissue Integrity  Goal: Absence of new skin breakdown  Description: 1. Monitor for areas of redness and/or skin breakdown  2. Assess vascular access sites hourly  3. Every 4-6 hours minimum:  Change oxygen saturation probe site  4.   Every 4-6 hours:  If on nasal continuous positive airway pressure, respiratory therapy assess nares and determine need for appliance change or resting period.   Outcome: Progressing     Problem: Safety - Adult  Goal: Free from fall injury  Outcome: Progressing  Flowsheets (Taken 2/17/2023 1957)  Free From Fall Injury:   Instruct family/caregiver on patient safety   Based on caregiver fall risk screen, instruct family/caregiver to ask for assistance with transferring infant if caregiver noted to have fall risk factors     Problem: ABCDS Injury Assessment  Goal: Absence of physical injury  Outcome: Progressing  Flowsheets (Taken 2/17/2023 1957)  Absence of Physical Injury: Implement safety measures based on patient assessment

## 2023-02-18 NOTE — PROGRESS NOTES
Nephrology Progress Note  2/18/2023 10:50 AM        Subjective:   Admit Date: 2/11/2023  PCP: Charmayne Pick    Interval History: Patient seen in early morning, this is a late entry  Remains on mechanical ventilation    Diet: Tube feed per nasogastric tube    ROS: On mechanical ventilation, assist control mode, FiO2 40% PEEP of 5  He responded to IV loop, had roughly liter of urine-  No fever, acceptable blood pressure without pressor  Only minimal sedation with 20 mics per kilo per minute of propofol    Data:     Current meds:    sodium chloride (Inhalant)  4 mL Nebulization Q4H    furosemide  80 mg IntraVENous TID    chlorothiazide (DIURIL) IVPB  500 mg IntraVENous Q12H    cefepime  1,000 mg IntraVENous Q12H    linezolid  600 mg IntraVENous Q12H    famotidine  20 mg Per NG tube Daily    heparin (porcine)  5,000 Units SubCUTAneous 3 times per day    sodium hypochlorite   Irrigation Daily    collagenase   Topical Daily    sodium chloride flush  5-40 mL IntraVENous 2 times per day    aspirin  81 mg Oral Daily    [Held by provider] clopidogrel  75 mg Oral Daily    ipratropium-albuterol  1 ampule Inhalation Q4H WA    chlorhexidine  15 mL Mouth/Throat BID      dextrose      sodium chloride      propofol 20 mcg/kg/min (02/18/23 0614)         I/O last 3 completed shifts:   In: 4012.2 [I.V.:385.3; NG/GT:2575; IV Piggyback:1051.8]  Out: 1965 [Urine:955; Emesis/NG output:170; Chest Tube:840]    CBC:   Recent Labs     02/16/23 0425 02/17/23  0320 02/18/23  0345   WBC 5.8 6.4 7.4   HGB 7.5* 7.4* 7.4*   * 111* 126*          Recent Labs     02/16/23  0425 02/17/23  0320 02/18/23  0345    135 133*   K 4.0 4.0 4.2    101 99   CO2 26 27 26   BUN 18 26* 37*   CREATININE 2.2* 3.0* 3.1*   GLUCOSE 179* 181* 200*       Lab Results   Component Value Date    CALCIUM 7.4 (L) 02/18/2023    PHOS 3.2 02/18/2023       Objective:     Vitals: BP (!) 142/54   Pulse 69   Temp 97.9 °F (36.6 °C) (Rectal)   Resp 20   Ht 6' (1.829 m)   Wt 284 lb 2.8 oz (128.9 kg)   SpO2 98%   BMI 38.54 kg/m² ,    General appearance: Intubated, does respond to noxious stimuli  HEENT: Positive conjunctival pallor no scleral icterus  Neck: Right IJ temporary dialysis catheter  Lungs: Limited anterior exam, positive adventitious breath sound-chest tube remains in right pleural space  Heart: Seemed irregular this morning, left chest wall implanted cardiac device  Abdomen: Soft  Extremities: Bilateral thigh, leg edema and chronic wound  He does have a Fuller catheter      Problem List :         Impression :     Acute kidney injury with underlying CKD stage G4 [id number] to be responding to diuretics  Septic shock are suspected likely has right lung pneumonia now with respiratory and other organ failure  Underlying atherosclerotic cardiovascular disease, diabetes,    Recommendation/Plan  :     As he is responding to IV loop I would intensify in addition IV thiazide-see how he does-if he fails medical therapy-we will consider kidney supportive therapy-watch for iatrogenic nosocomial complication, adequate treatment for his underlying infection-hopefully extubate at some point-follow clinically and biochemically      Amarjit Duarte MD MD

## 2023-02-18 NOTE — PROGRESS NOTES
I have personally seen and examined the patient independently. I have reviewed the patient's available data,including medical history and recent test results. Reviewed and discussed note as documented by WAGNER. I agree with the physical exam findings, assessment and plan. My documented complex medical decisions constitute a substantive portion of the supervisory note. 41 minutes    Acute respiratory failure with hypoxia, hypercapnia  Hypervolemia/volume overload, mild pulmonary edema  Acute kidney injury (superimposed on CKD stage III), currently requires dialytic support (transitioned to intermittent)  Hypervolemia/pulmonary edema due to above  Coronary artery disease  History of chronic diastolic heart failure (suspect acute on chronic)  ICD  History of colon resection for colon cancer  Diabetes mellitus  Severe peripheral vascular disease, purulent wounds noted both lower extremities    Continue full ventilatory support, attempted trial of spontaneous breathing poorly tolerated (dys-synchronous, desaturation), reattempt on a daily basis. Continue chest tube right chest (placed 2/16/2023 given large pleural effusion, suspected transudate)  Dialysis/volume removal per nephrology service (note \"trial\" of high dose loop diuretic)  Antimicrobial therapy for treatment of lower extremity wound infections, adjusted to cover MRSA, Pseudomonas  Glycemic control  Oral nutritional support  Ulcer, DVT prophylaxis      Complex medical decisions required for today's evaluation and management reviewed in detail during critical care rounds. I foresee that this patient will likely require tracheostomy, feeding tube placement, ongoing dialytic support if the family/POA wish to continue aggressive medical measures should he not successfully withdraw from mechanical ventilatory support nor regain renal function.     Domi Dc  [phone]

## 2023-02-18 NOTE — PROGRESS NOTES
V2.0  [facility] [place] Critical Care Progress Note      Name:  Jose Mack /Age/Sex: 1950  (67 y.o. male)   MRN & CSN:  [id number] & [id number] Encounter Date/Time: 2023 4:09 PM EST    Location:  -A PCP: Kameron Isaac Day: 8    Assessment and Plan:   Jose Mack is a 67 y.o. male who presents with Acute respiratory failure with hypoxia and hypercapnia (HCC)    Acute on chronic renal failure  Acute respiratory failure, requiring intubation  Hyperkalemia-resolved  Acute metabolic encephalopathy  Chronic LE wounds   HTN  CAD  Heart failure with preserved ejection fraction  Large right pleural effusion     Plan:     Neuro - sedated on propofol, patient reportedly agitated when sedation is lightened. Today, he does open eyes when name called and turn towards me. Will continue to attempt to wean sedation as able. EEG obtained-no electrographic seizures/NCSE    CV - acute on chronic HF. EF 50-55% in . Resp - acute hypoxic hypercapneic resp failure. Intubated. SAT/SBT trials as able. Sedated on propofol. Continue DuoNebs, pulmonary hygiene. Continue right sided chest tube. CXR today reviewed. GI - Tolerating tube feed. GI prophylaxis      - NANCY on CKD 3/. Baseline Cr 2.2. adm Cr 4.8. sCr now 3.1; CRRT has been stopped. Nephrology following. Urology consulted for difficult parkinson; Distal urethral stricture with incomplete bladder emptying-cystoscopy/TRUS as outpatient if voiding issues persist, voiding trial prior to discharge     ID -Bilateral lower extremity wounds; wound cultures on -positive for Alcaligenes faecalis, Staph aureus MRSA, Finegoldia magna. Antibiotics changed to cefepime and Zyvox continue. White blood count stable at 5.8 today. Heme -hemoglobin-stable. 7.4 today. MSK - LE wounds bilaterally. Wound care consult. Wet-dry for now with xeroform. Antibiotics as above.       Diet Diet NPO  ADULT TUBE FEEDING; Nasogastric; Peptide Based High Protein; Continuous; 20; Yes; 10; Q 4 hours; 65; 30; Q 4 hours   DVT Prophylaxis [] Lovenox, [x]  Heparin, [] SCDs, [] Ambulation,  [] Eliquis, [] Xarelto  [] Coumadin   Code Status Full Code   Disposition From: Home  Expected Disposition: TBD  Estimated Date of Discharge: TBD  Patient requires continued admission due to respiratory failure   Surrogate Decision Maker/ POA Child     Subjective:      Patient seen and examined this morning. Currently remains critically ill on the ventilator. He is currently being sedated on propofol. He does open eyes when name called, turns towards me. Does not follow commands. There is no family at the bedside. Review of systems impossible this time given mental status. Plan of care discussed with bedside RN. Review of Systems:    Review of Systems    Unable to complete secondary to intubation    Objective: Intake/Output Summary (Last 24 hours) at 2/18/2023 0939  Last data filed at 2/18/2023 0849  Gross per 24 hour   Intake 2718.47 ml   Output 1200 ml   Net 1518.47 ml          Vitals:   Vitals:    02/18/23 0849   BP: (!) 142/54   Pulse:    Resp:    Temp:    SpO2:        Physical Exam:     General: Ill-appearing male, NAD  Eyes: EOMI  ENT: neck supple, ETT, NGT  Cardiovascular: Regular rate. Respiratory: Scattered rhonchi, no wheezing. On ventilator. Gastrointestinal: Obese, soft, non tender  Genitourinary: no suprapubic tenderness, Fuller catheter in place.   Musculoskeletal: 2+ lower extremity edema, chronic wounds on bilateral lower extremities- dressing clean/dry/intact  Skin: warm, dry  Neuro: Currently ventilator, sedated, not following commands, cranial nerves grossly intact  psych: Unable to assess    Medications:   Medications:    sodium chloride (Inhalant)  4 mL Nebulization Q4H    furosemide  80 mg IntraVENous TID    chlorothiazide (DIURIL) IVPB  500 mg IntraVENous Q12H    cefepime  1,000 mg IntraVENous Q12H    linezolid  600 mg IntraVENous Q12H famotidine  20 mg Per NG tube Daily    heparin (porcine)  5,000 Units SubCUTAneous 3 times per day    sodium hypochlorite   Irrigation Daily    collagenase   Topical Daily    sodium chloride flush  5-40 mL IntraVENous 2 times per day    aspirin  81 mg Oral Daily    [Held by provider] clopidogrel  75 mg Oral Daily    ipratropium-albuterol  1 ampule Inhalation Q4H WA    chlorhexidine  15 mL Mouth/Throat BID      Infusions:    dextrose      sodium chloride      propofol 20 mcg/kg/min (02/18/23 0614)     PRN Meds: albuterol, 2.5 mg, Q2H PRN  heparin flush, 240 Units, PRN  fentanNYL, 50 mcg, Q1H PRN  midazolam, 1 mg, Q2H PRN  glucose, 4 tablet, PRN  dextrose bolus, 125 mL, PRN   Or  dextrose bolus, 250 mL, PRN  glucagon (rDNA), 1 mg, PRN  dextrose, , Continuous PRN  sodium chloride flush, 10 mL, PRN  sodium chloride, , PRN      Labs      Recent Results (from the past 24 hour(s))   POCT Glucose    Collection Time: 02/17/23  2:12 PM   Result Value Ref Range    POC Glucose 207 (H) 70 - 99 MG/DL   POCT Glucose    Collection Time: 02/17/23  7:29 PM   Result Value Ref Range    POC Glucose 190 (H) 70 - 99 MG/DL   POCT Glucose    Collection Time: 02/18/23  1:24 AM   Result Value Ref Range    POC Glucose 216 (H) 70 - 99 MG/DL   CBC with Auto Differential    Collection Time: 02/18/23  3:45 AM   Result Value Ref Range    WBC 7.4 4.0 - 10.5 K/CU MM    RBC 2.92 (L) 4.6 - 6.2 M/CU MM    Hemoglobin 7.4 (L) 13.5 - 18.0 GM/DL    Hematocrit 25.2 (L) 42 - 52 %    MCV 86.3 78 - 100 FL    MCH 25.3 (L) 27 - 31 PG    MCHC 29.4 (L) 32.0 - 36.0 %    RDW 19.7 (H) 11.7 - 14.9 %    Platelets 757 (L) 564 - 440 K/CU MM    MPV 10.5 7.5 - 11.1 FL    Differential Type AUTOMATED DIFFERENTIAL     Segs Relative 80.1 (H) 36 - 66 %    Lymphocytes % 9.4 (L) 24 - 44 %    Monocytes % 8.3 (H) 0 - 4 %    Eosinophils % 1.5 0 - 3 %    Basophils % 0.4 0 - 1 %    Segs Absolute 5.9 K/CU MM    Lymphocytes Absolute 0.7 K/CU MM    Monocytes Absolute 0.6 K/CU MM Eosinophils Absolute 0.1 K/CU MM    Basophils Absolute 0.0 K/CU MM    Nucleated RBC % 0.3 %    Total Nucleated RBC 0.0 K/CU MM    Total Immature Neutrophil 0.02 K/CU MM    Immature Neutrophil % 0.3 0 - 0.43 %   Calcium, Ionized    Collection Time: 02/18/23  3:45 AM   Result Value Ref Range    Ionized Ca 1.10 (L) 1.12 - 1.32 mMOL/L    Calcium, Ionized 4.40 (L) 4.48 - 5.28 MG/DL   Comprehensive Metabolic Panel w/ Reflex to MG    Collection Time: 02/18/23  3:45 AM   Result Value Ref Range    Sodium 133 (L) 135 - 145 MMOL/L    Potassium 4.2 3.5 - 5.1 MMOL/L    Chloride 99 99 - 110 mMol/L    CO2 26 21 - 32 MMOL/L    BUN 37 (H) 6 - 23 MG/DL    Creatinine 3.1 (H) 0.9 - 1.3 MG/DL    Est, Glom Filt Rate 21 (L) >60 mL/min/1.73m2    Glucose 200 (H) 70 - 99 MG/DL    Calcium 7.4 (L) 8.3 - 10.6 MG/DL    Albumin 2.3 (L) 3.4 - 5.0 GM/DL    Total Protein 5.5 (L) 6.4 - 8.2 GM/DL    Total Bilirubin 0.8 0.0 - 1.0 MG/DL    ALT 6 (L) 10 - 40 U/L    AST 14 (L) 15 - 37 IU/L    Alkaline Phosphatase 73 40 - 128 IU/L    Anion Gap 8 4 - 16   Magnesium    Collection Time: 02/18/23  3:45 AM   Result Value Ref Range    Magnesium 1.9 1.8 - 2.4 mg/dl   Phosphorus    Collection Time: 02/18/23  3:45 AM   Result Value Ref Range    Phosphorus 3.2 2.5 - 4.9 MG/DL   POCT Glucose    Collection Time: 02/18/23  6:16 AM   Result Value Ref Range    POC Glucose 200 (H) 70 - 99 MG/DL        Imaging/Diagnostics Last 24 Hours   XR CHEST PORTABLE    Result Date: 2/11/2023  EXAMINATION: ONE XRAY VIEW OF THE CHEST 2/11/2023 7:29 pm COMPARISON: Chest radiograph 02/11/2023 HISTORY: ORDERING SYSTEM PROVIDED HISTORY: ETT placement TECHNOLOGIST PROVIDED HISTORY: Reason for exam:->ETT placement Reason for Exam: et and og tube placement confirmation Additional signs and symptoms: et and og tube placement confrimation FINDINGS: Lines and tubes: -ETT terminates at the superior margin of the clavicles.  -enteric tube takes a the subdiaphragmatic course and terminates out of the field of view -right-sided Cordis catheter, which terminates within the mid/upper SVC Lungs: There is indistinctness of the pulmonary vasculature with patchy opacities within the right greater than left lungs. . Pleura: Small right-sided pleural effusion. Cardiomediastinal silhouette: Enlarged cardiac silhouette. Bones: No acute osseous findings. Soft tissues: Left-sided 2 lead cardiac device. Lines and tubes as above. The ETT terminates at the level of the superior margin of the clavicles. Grossly unchanged pulmonary exam.  Findings favor cardiogenic pulmonary edema. However a superimposed infectious process cannot be excluded. I personally saw the patient and independently provided 38 minutes of non-concurrent critical care out of the total shared critical care time provided.     Electronically signed by MICKY Oviedo CNP on 2/18/2023 at 9:39 AM

## 2023-02-18 NOTE — PROGRESS NOTES
V2.0  [facility] [place] Hospitalist Progress Note      Name:  Carlton Negron /Age/Sex: 1950  (67 y.o. male)   MRN & CSN:  [id number] & [id number] Encounter Date/Time: 2023 6:51 PM EST    Location:  -A PCP: Axel Carlson Day: 8    Assessment and Plan:   Carlton Negron is a 67 y.o. male with pmh of  CAD, HFpEF, ICD, CKD, DM who presents with Acute respiratory failure with hypoxia and hypercapnia ([facility] [address]    Plan:  Acute hypoxic Hypercapneic respiratory failure: was initially placed on BIPAP but later was intubated on MV was started on Empiric broad spectrum Abx --> continue clinda. CXR (): worsening R base opacity and small L pleural effusion  Cardiac arrest due to hypoxia: underwent CPR after pulse became thready after patient was reintubated when he was not being ventilated. Chest tube was also placed. Acute renal failure with h/o CKD stage 3: was initially started on HD but could not tolerate. CRRT stopped, nephro wants to try \"furosemide stress test\"--> 1300 output (net positive 1400), not more than previous day. Follow up on nephro recs  Hyperkalemia-resolved: in the setting of above, improving  Elevated troponin: likely type 2 MI in the setting of respiratory as well as kidney failure, does have h/o CAD with multiple stents, Cardio on board. HFpEF:  EF 50-55% in . Repeat echo with simillar EF, hypokinetic RV with bowing of Interventricular septum towards LV. Chronic LE wounds: Wound care on board. Likely infected, Purulent drainage from RLE 1st metatarsal which is foul smelling. General surgery on consult, appreciate recs.  Wound cultures sent, follow up    Diet Diet NPO  ADULT TUBE FEEDING; Nasogastric; Peptide Based High Protein; Continuous; 20; Yes; 10; Q 4 hours; 65; 30; Q 4 hours   DVT Prophylaxis [] Lovenox, [x]  Heparin, [] SCDs, [] Ambulation,  [] Eliquis, [] Xarelto  [] Coumadin   Code Status Full Code   Disposition From: Home  Expected Disposition: TBD  Estimated Date of Discharge: TBD  Patient requires continued admission due to Respiratory and renal failure   Surrogate Decision Maker/ POA      Subjective:     Chief Complaint: Respiratory Distress     Patient intubated      Review of Systems:    Review of Systems  Could not be obtained, patient intubated    Objective: Intake/Output Summary (Last 24 hours) at 2/18/2023 0837  Last data filed at 2/18/2023 0551  Gross per 24 hour   Intake 2708.47 ml   Output 1240 ml   Net 1468.47 ml          Vitals:   Vitals:    02/18/23 0600   BP: 132/60   Pulse: 69   Resp: 20   Temp:    SpO2: 98%       Physical Exam:   Physical Exam General: Ill-appearing male, NAD, intubated  Eyes: EOMI  ENT: neck supple, ETT  Respiratory:  mechanical ventilation, clear to auscultation  Gastrointestinal: Obese, soft, non tender  Genitourinary: no suprapubic tenderness, Fuller catheter  Musculoskeletal: wounds covered in bandages   Skin: warm, dry  Neuro: Unresponsive on ventilator.       Medications:   Medications:    sodium chloride (Inhalant)  4 mL Nebulization Q4H    furosemide  80 mg IntraVENous TID    chlorothiazide (DIURIL) IVPB  500 mg IntraVENous Q12H    cefepime  1,000 mg IntraVENous Q12H    linezolid  600 mg IntraVENous Q12H    famotidine  20 mg Per NG tube Daily    heparin (porcine)  5,000 Units SubCUTAneous 3 times per day    sodium hypochlorite   Irrigation Daily    collagenase   Topical Daily    sodium chloride flush  5-40 mL IntraVENous 2 times per day    aspirin  81 mg Oral Daily    [Held by provider] clopidogrel  75 mg Oral Daily    ipratropium-albuterol  1 ampule Inhalation Q4H WA    chlorhexidine  15 mL Mouth/Throat BID      Infusions:    dextrose      sodium chloride      propofol 20 mcg/kg/min (02/18/23 0614)     PRN Meds: albuterol, 2.5 mg, Q2H PRN  heparin flush, 240 Units, PRN  fentanNYL, 50 mcg, Q1H PRN  midazolam, 1 mg, Q2H PRN  glucose, 4 tablet, PRN  dextrose bolus, 125 mL, PRN   Or  dextrose bolus, 250 mL, PRN  glucagon (rDNA), 1 mg, PRN  dextrose, , Continuous PRN  sodium chloride flush, 10 mL, PRN  sodium chloride, , PRN      Labs      Recent Results (from the past 24 hour(s))   POCT Glucose    Collection Time: 02/17/23  2:12 PM   Result Value Ref Range    POC Glucose 207 (H) 70 - 99 MG/DL   POCT Glucose    Collection Time: 02/17/23  7:29 PM   Result Value Ref Range    POC Glucose 190 (H) 70 - 99 MG/DL   POCT Glucose    Collection Time: 02/18/23  1:24 AM   Result Value Ref Range    POC Glucose 216 (H) 70 - 99 MG/DL   CBC with Auto Differential    Collection Time: 02/18/23  3:45 AM   Result Value Ref Range    WBC 7.4 4.0 - 10.5 K/CU MM    RBC 2.92 (L) 4.6 - 6.2 M/CU MM    Hemoglobin 7.4 (L) 13.5 - 18.0 GM/DL    Hematocrit 25.2 (L) 42 - 52 %    MCV 86.3 78 - 100 FL    MCH 25.3 (L) 27 - 31 PG    MCHC 29.4 (L) 32.0 - 36.0 %    RDW 19.7 (H) 11.7 - 14.9 %    Platelets 349 (L) 521 - 440 K/CU MM    MPV 10.5 7.5 - 11.1 FL    Differential Type AUTOMATED DIFFERENTIAL     Segs Relative 80.1 (H) 36 - 66 %    Lymphocytes % 9.4 (L) 24 - 44 %    Monocytes % 8.3 (H) 0 - 4 %    Eosinophils % 1.5 0 - 3 %    Basophils % 0.4 0 - 1 %    Segs Absolute 5.9 K/CU MM    Lymphocytes Absolute 0.7 K/CU MM    Monocytes Absolute 0.6 K/CU MM    Eosinophils Absolute 0.1 K/CU MM    Basophils Absolute 0.0 K/CU MM    Nucleated RBC % 0.3 %    Total Nucleated RBC 0.0 K/CU MM    Total Immature Neutrophil 0.02 K/CU MM    Immature Neutrophil % 0.3 0 - 0.43 %   Calcium, Ionized    Collection Time: 02/18/23  3:45 AM   Result Value Ref Range    Ionized Ca 1.10 (L) 1.12 - 1.32 mMOL/L    Calcium, Ionized 4.40 (L) 4.48 - 5.28 MG/DL   Comprehensive Metabolic Panel w/ Reflex to MG    Collection Time: 02/18/23  3:45 AM   Result Value Ref Range    Sodium 133 (L) 135 - 145 MMOL/L    Potassium 4.2 3.5 - 5.1 MMOL/L    Chloride 99 99 - 110 mMol/L    CO2 26 21 - 32 MMOL/L    BUN 37 (H) 6 - 23 MG/DL    Creatinine 3.1 (H) 0.9 - 1.3 MG/DL    Est, Glom Filt Rate 21 (L) >60 mL/min/1.73m2    Glucose 200 (H) 70 - 99 MG/DL    Calcium 7.4 (L) 8.3 - 10.6 MG/DL    Albumin 2.3 (L) 3.4 - 5.0 GM/DL    Total Protein 5.5 (L) 6.4 - 8.2 GM/DL    Total Bilirubin 0.8 0.0 - 1.0 MG/DL    ALT 6 (L) 10 - 40 U/L    AST 14 (L) 15 - 37 IU/L    Alkaline Phosphatase 73 40 - 128 IU/L    Anion Gap 8 4 - 16   Magnesium    Collection Time: 02/18/23  3:45 AM   Result Value Ref Range    Magnesium 1.9 1.8 - 2.4 mg/dl   Phosphorus    Collection Time: 02/18/23  3:45 AM   Result Value Ref Range    Phosphorus 3.2 2.5 - 4.9 MG/DL   POCT Glucose    Collection Time: 02/18/23  6:16 AM   Result Value Ref Range    POC Glucose 200 (H) 70 - 99 MG/DL        Imaging/Diagnostics Last 24 Hours   XR CHEST PORTABLE    Result Date: 2/11/2023  EXAMINATION: ONE XRAY VIEW OF THE CHEST 2/11/2023 7:29 pm COMPARISON: Chest radiograph 02/11/2023 HISTORY: ORDERING SYSTEM PROVIDED HISTORY: ETT placement TECHNOLOGIST PROVIDED HISTORY: Reason for exam:->ETT placement Reason for Exam: et and og tube placement confirmation Additional signs and symptoms: et and og tube placement confrimation FINDINGS: Lines and tubes: -ETT terminates at the superior margin of the clavicles. -enteric tube takes a the subdiaphragmatic course and terminates out of the field of view -right-sided Cordis catheter, which terminates within the mid/upper SVC Lungs: There is indistinctness of the pulmonary vasculature with patchy opacities within the right greater than left lungs. . Pleura: Small right-sided pleural effusion. Cardiomediastinal silhouette: Enlarged cardiac silhouette. Bones: No acute osseous findings. Soft tissues: Left-sided 2 lead cardiac device. Lines and tubes as above. The ETT terminates at the level of the superior margin of the clavicles. Grossly unchanged pulmonary exam.  Findings favor cardiogenic pulmonary edema. However a superimposed infectious process cannot be excluded.        Electronically signed by Debora Bryant MD on 2/18/2023 at 8:37 AM

## 2023-02-19 LAB
ALBUMIN SERPL-MCNC: 2.5 GM/DL (ref 3.4–5)
ALP BLD-CCNC: 74 IU/L (ref 40–128)
ALT SERPL-CCNC: 7 U/L (ref 10–40)
ANION GAP SERPL CALCULATED.3IONS-SCNC: 9 MMOL/L (ref 4–16)
AST SERPL-CCNC: 15 IU/L (ref 15–37)
BASOPHILS ABSOLUTE: 0 K/CU MM
BASOPHILS RELATIVE PERCENT: 0.2 % (ref 0–1)
BILIRUB SERPL-MCNC: 0.7 MG/DL (ref 0–1)
BUN SERPL-MCNC: 44 MG/DL (ref 6–23)
CALCIUM SERPL-MCNC: 7.7 MG/DL (ref 8.3–10.6)
CHLORIDE BLD-SCNC: 97 MMOL/L (ref 99–110)
CO2: 27 MMOL/L (ref 21–32)
CREAT SERPL-MCNC: 3.2 MG/DL (ref 0.9–1.3)
DIFFERENTIAL TYPE: ABNORMAL
EOSINOPHILS ABSOLUTE: 0.1 K/CU MM
EOSINOPHILS RELATIVE PERCENT: 1.3 % (ref 0–3)
GFR SERPL CREATININE-BSD FRML MDRD: 20 ML/MIN/1.73M2
GLUCOSE BLD-MCNC: 238 MG/DL (ref 70–99)
GLUCOSE BLD-MCNC: 261 MG/DL (ref 70–99)
GLUCOSE BLD-MCNC: 288 MG/DL (ref 70–99)
GLUCOSE BLD-MCNC: 301 MG/DL (ref 70–99)
GLUCOSE BLD-MCNC: 343 MG/DL (ref 70–99)
GLUCOSE SERPL-MCNC: 212 MG/DL (ref 70–99)
HCT VFR BLD CALC: 25.3 % (ref 42–52)
HEMOGLOBIN: 7.7 GM/DL (ref 13.5–18)
IMMATURE NEUTROPHIL %: 0.4 % (ref 0–0.43)
LYMPHOCYTES ABSOLUTE: 0.9 K/CU MM
LYMPHOCYTES RELATIVE PERCENT: 11.3 % (ref 24–44)
MAGNESIUM: 1.9 MG/DL (ref 1.8–2.4)
MCH RBC QN AUTO: 26.2 PG (ref 27–31)
MCHC RBC AUTO-ENTMCNC: 30.4 % (ref 32–36)
MCV RBC AUTO: 86.1 FL (ref 78–100)
MONOCYTES ABSOLUTE: 0.7 K/CU MM
MONOCYTES RELATIVE PERCENT: 8 % (ref 0–4)
NUCLEATED RBC %: 0 %
PDW BLD-RTO: 19.6 % (ref 11.7–14.9)
PHOSPHORUS: 3.2 MG/DL (ref 2.5–4.9)
PLATELET # BLD: 148 K/CU MM (ref 140–440)
PMV BLD AUTO: 11 FL (ref 7.5–11.1)
POTASSIUM SERPL-SCNC: 4.4 MMOL/L (ref 3.5–5.1)
RBC # BLD: 2.94 M/CU MM (ref 4.6–6.2)
SEGMENTED NEUTROPHILS ABSOLUTE COUNT: 6.5 K/CU MM
SEGMENTED NEUTROPHILS RELATIVE PERCENT: 78.8 % (ref 36–66)
SODIUM BLD-SCNC: 133 MMOL/L (ref 135–145)
TOTAL IMMATURE NEUTOROPHIL: 0.03 K/CU MM
TOTAL NUCLEATED RBC: 0 K/CU MM
TOTAL PROTEIN: 5.7 GM/DL (ref 6.4–8.2)
WBC # BLD: 8.3 K/CU MM (ref 4–10.5)

## 2023-02-19 PROCEDURE — 6370000000 HC RX 637 (ALT 250 FOR IP): Performed by: NURSE PRACTITIONER

## 2023-02-19 PROCEDURE — 6360000002 HC RX W HCPCS: Performed by: INTERNAL MEDICINE

## 2023-02-19 PROCEDURE — 80053 COMPREHEN METABOLIC PANEL: CPT

## 2023-02-19 PROCEDURE — 2580000003 HC RX 258: Performed by: SPECIALIST

## 2023-02-19 PROCEDURE — 83735 ASSAY OF MAGNESIUM: CPT

## 2023-02-19 PROCEDURE — 2580000003 HC RX 258: Performed by: STUDENT IN AN ORGANIZED HEALTH CARE EDUCATION/TRAINING PROGRAM

## 2023-02-19 PROCEDURE — 2580000003 HC RX 258: Performed by: NURSE PRACTITIONER

## 2023-02-19 PROCEDURE — 6370000000 HC RX 637 (ALT 250 FOR IP): Performed by: STUDENT IN AN ORGANIZED HEALTH CARE EDUCATION/TRAINING PROGRAM

## 2023-02-19 PROCEDURE — 89220 SPUTUM SPECIMEN COLLECTION: CPT

## 2023-02-19 PROCEDURE — 2700000000 HC OXYGEN THERAPY PER DAY

## 2023-02-19 PROCEDURE — 94003 VENT MGMT INPAT SUBQ DAY: CPT

## 2023-02-19 PROCEDURE — 94761 N-INVAS EAR/PLS OXIMETRY MLT: CPT

## 2023-02-19 PROCEDURE — 2580000003 HC RX 258: Performed by: INTERNAL MEDICINE

## 2023-02-19 PROCEDURE — 2500000003 HC RX 250 WO HCPCS: Performed by: NURSE PRACTITIONER

## 2023-02-19 PROCEDURE — 6360000002 HC RX W HCPCS: Performed by: NURSE PRACTITIONER

## 2023-02-19 PROCEDURE — 2000000000 HC ICU R&B

## 2023-02-19 PROCEDURE — 85025 COMPLETE CBC W/AUTO DIFF WBC: CPT

## 2023-02-19 PROCEDURE — 84100 ASSAY OF PHOSPHORUS: CPT

## 2023-02-19 PROCEDURE — 6360000002 HC RX W HCPCS: Performed by: SPECIALIST

## 2023-02-19 PROCEDURE — 94640 AIRWAY INHALATION TREATMENT: CPT

## 2023-02-19 PROCEDURE — 82962 GLUCOSE BLOOD TEST: CPT

## 2023-02-19 RX ORDER — INSULIN LISPRO 100 [IU]/ML
0-8 INJECTION, SOLUTION INTRAVENOUS; SUBCUTANEOUS EVERY 4 HOURS
Status: DISCONTINUED | OUTPATIENT
Start: 2023-02-19 | End: 2023-02-20

## 2023-02-19 RX ADMIN — MIDAZOLAM 1 MG: 1 INJECTION INTRAMUSCULAR; INTRAVENOUS at 18:29

## 2023-02-19 RX ADMIN — DEXMEDETOMIDINE HYDROCHLORIDE 0.4 MCG/KG/HR: 4 INJECTION, SOLUTION INTRAVENOUS at 06:23

## 2023-02-19 RX ADMIN — IPRATROPIUM BROMIDE AND ALBUTEROL SULFATE 1 AMPULE: 2.5; .5 SOLUTION RESPIRATORY (INHALATION) at 16:21

## 2023-02-19 RX ADMIN — DEXMEDETOMIDINE HYDROCHLORIDE 0.8 MCG/KG/HR: 4 INJECTION, SOLUTION INTRAVENOUS at 19:23

## 2023-02-19 RX ADMIN — CHLORHEXIDINE GLUCONATE 0.12% ORAL RINSE 15 ML: 1.2 LIQUID ORAL at 08:51

## 2023-02-19 RX ADMIN — ASPIRIN 81 MG CHEWABLE TABLET 81 MG: 81 TABLET CHEWABLE at 08:52

## 2023-02-19 RX ADMIN — Medication 4 ML: at 03:28

## 2023-02-19 RX ADMIN — LINEZOLID 600 MG: 600 INJECTION, SOLUTION INTRAVENOUS at 23:02

## 2023-02-19 RX ADMIN — INSULIN LISPRO 6 UNITS: 100 INJECTION, SOLUTION INTRAVENOUS; SUBCUTANEOUS at 23:48

## 2023-02-19 RX ADMIN — IPRATROPIUM BROMIDE AND ALBUTEROL SULFATE 1 AMPULE: 2.5; .5 SOLUTION RESPIRATORY (INHALATION) at 07:22

## 2023-02-19 RX ADMIN — HEPARIN SODIUM 5000 UNITS: 5000 INJECTION INTRAVENOUS; SUBCUTANEOUS at 14:08

## 2023-02-19 RX ADMIN — Medication 4 ML: at 23:11

## 2023-02-19 RX ADMIN — Medication 4 ML: at 16:20

## 2023-02-19 RX ADMIN — FUROSEMIDE 80 MG: 10 INJECTION, SOLUTION INTRAMUSCULAR; INTRAVENOUS at 08:52

## 2023-02-19 RX ADMIN — FUROSEMIDE 80 MG: 10 INJECTION, SOLUTION INTRAMUSCULAR; INTRAVENOUS at 14:08

## 2023-02-19 RX ADMIN — SODIUM HYPOCHLORITE: 1.25 SOLUTION TOPICAL at 08:51

## 2023-02-19 RX ADMIN — SODIUM CHLORIDE, PRESERVATIVE FREE 10 ML: 5 INJECTION INTRAVENOUS at 20:29

## 2023-02-19 RX ADMIN — Medication 4 ML: at 19:59

## 2023-02-19 RX ADMIN — CEFEPIME HYDROCHLORIDE 1000 MG: 1 INJECTION, POWDER, FOR SOLUTION INTRAMUSCULAR; INTRAVENOUS at 11:25

## 2023-02-19 RX ADMIN — LINEZOLID 600 MG: 600 INJECTION, SOLUTION INTRAVENOUS at 11:24

## 2023-02-19 RX ADMIN — FUROSEMIDE 80 MG: 10 INJECTION, SOLUTION INTRAMUSCULAR; INTRAVENOUS at 20:25

## 2023-02-19 RX ADMIN — DEXMEDETOMIDINE HYDROCHLORIDE 0.6 MCG/KG/HR: 4 INJECTION, SOLUTION INTRAVENOUS at 14:19

## 2023-02-19 RX ADMIN — COLLAGENASE SANTYL: 250 OINTMENT TOPICAL at 08:51

## 2023-02-19 RX ADMIN — CEFEPIME HYDROCHLORIDE 1000 MG: 1 INJECTION, POWDER, FOR SOLUTION INTRAMUSCULAR; INTRAVENOUS at 22:54

## 2023-02-19 RX ADMIN — IPRATROPIUM BROMIDE AND ALBUTEROL SULFATE 1 AMPULE: 2.5; .5 SOLUTION RESPIRATORY (INHALATION) at 19:57

## 2023-02-19 RX ADMIN — FAMOTIDINE 20 MG: 20 TABLET ORAL at 08:52

## 2023-02-19 RX ADMIN — HEPARIN SODIUM 5000 UNITS: 5000 INJECTION INTRAVENOUS; SUBCUTANEOUS at 05:46

## 2023-02-19 RX ADMIN — SODIUM CHLORIDE, PRESERVATIVE FREE 10 ML: 5 INJECTION INTRAVENOUS at 08:52

## 2023-02-19 RX ADMIN — CHLOROTHIAZIDE SODIUM 500 MG: 500 INJECTION, POWDER, LYOPHILIZED, FOR SOLUTION INTRAVENOUS at 18:55

## 2023-02-19 RX ADMIN — CHLOROTHIAZIDE SODIUM 500 MG: 500 INJECTION, POWDER, LYOPHILIZED, FOR SOLUTION INTRAVENOUS at 05:49

## 2023-02-19 RX ADMIN — Medication 4 ML: at 11:18

## 2023-02-19 RX ADMIN — INSULIN LISPRO 6 UNITS: 100 INJECTION, SOLUTION INTRAVENOUS; SUBCUTANEOUS at 20:25

## 2023-02-19 RX ADMIN — Medication 4 ML: at 07:23

## 2023-02-19 RX ADMIN — IPRATROPIUM BROMIDE AND ALBUTEROL SULFATE 1 AMPULE: 2.5; .5 SOLUTION RESPIRATORY (INHALATION) at 11:17

## 2023-02-19 RX ADMIN — HEPARIN SODIUM 5000 UNITS: 5000 INJECTION INTRAVENOUS; SUBCUTANEOUS at 21:55

## 2023-02-19 RX ADMIN — CHLORHEXIDINE GLUCONATE 0.12% ORAL RINSE 15 ML: 1.2 LIQUID ORAL at 20:25

## 2023-02-19 ASSESSMENT — PULMONARY FUNCTION TESTS
PIF_VALUE: 20
PIF_VALUE: 21
PIF_VALUE: 20
PIF_VALUE: 21
PIF_VALUE: 20

## 2023-02-19 NOTE — PROGRESS NOTES
V2.0  [facility] [place]      Name:  Eddie Amaya /Age/Sex: 1950  (67 y.o. male)   MRN & CSN:  [id number] & [id number] Encounter Date/Time: 2023 6:51 PM EST    Location:  -A PCP: Bakari Diego Day: 9    Assessment and Plan:   Eddie Amaya is a 67 y.o. male with pmh of  CAD, HFpEF, ICD, CKD, DM who presents with Acute respiratory failure with hypoxia and hypercapnia ([facility] [address]    Plan:  Acute hypoxic Hypercapneic respiratory failure: was initially placed on BIPAP but later was intubated on MV was started on Empiric broad spectrum Abx --> continue clinda. CXR (): worsening R base opacity and small L pleural effusion  Cardiac arrest due to hypoxia: underwent CPR after pulse became thready after patient was reintubated when he was not being ventilated. Chest tube was also placed. Now on precidex   Acute renal failure with h/o CKD stage 3: was initially started on HD but could not tolerate. CRRT stopped, nephro wants to try \"furosemide stress test\"--> 1300 output (net positive 1400), not more than previous day. Follow up on nephro recs  Hyperkalemia-resolved: in the setting of above, improving  Elevated troponin: likely type 2 MI in the setting of respiratory as well as kidney failure, does have h/o CAD with multiple stents, Cardio on board. HFpEF:  EF 50-55% in . Repeat echo with simillar EF, hypokinetic RV with bowing of Interventricular septum towards LV. Chronic LE wounds: Wound care on board. Likely infected, Purulent drainage from RLE 1st metatarsal which is foul smelling. General surgery on consult, appreciate recs.  Wound cultures sent, follow up    Diet Diet NPO  ADULT TUBE FEEDING; Nasogastric; Peptide Based High Protein; Continuous; 20; Yes; 10; Q 4 hours; 65; 30; Q 4 hours   DVT Prophylaxis [] Lovenox, [x]  Heparin, [] SCDs, [] Ambulation,  [] Eliquis, [] Xarelto  [] Coumadin   Code Status Full Code   Disposition From: Home  Expected Disposition: TBD  Estimated Date of Discharge: TBD  Patient requires continued admission due to Respiratory and renal failure   Surrogate Decision Maker/ POA      Subjective:     Chief Complaint: Respiratory Distress     Patient intubated on precidex      Review of Systems:    Review of Systems  Could not be obtained, patient intubated    Objective: Intake/Output Summary (Last 24 hours) at 2/19/2023 0747  Last data filed at 2/19/2023 0700  Gross per 24 hour   Intake 2734.7 ml   Output 3240 ml   Net -505.3 ml          Vitals:   Vitals:    02/19/23 0723   BP:    Pulse: 60   Resp: 20   Temp:    SpO2: 100%       Physical Exam:   Physical Exam General: Ill-appearing male, NAD, intubated  Eyes: EOMI  ENT: neck supple, ETT  Respiratory:  mechanical ventilation, clear to auscultation  Gastrointestinal: Obese, soft, non tender  Genitourinary: no suprapubic tenderness, Fuller catheter  Musculoskeletal: wounds covered in bandages   Skin: warm, dry  Neuro: Unresponsive on ventilator.       Medications:   Medications:    sodium chloride (Inhalant)  4 mL Nebulization Q4H    furosemide  80 mg IntraVENous TID    chlorothiazide (DIURIL) IVPB  500 mg IntraVENous Q12H    cefepime  1,000 mg IntraVENous Q12H    linezolid  600 mg IntraVENous Q12H    famotidine  20 mg Per NG tube Daily    heparin (porcine)  5,000 Units SubCUTAneous 3 times per day    sodium hypochlorite   Irrigation Daily    collagenase   Topical Daily    sodium chloride flush  5-40 mL IntraVENous 2 times per day    aspirin  81 mg Oral Daily    [Held by provider] clopidogrel  75 mg Oral Daily    ipratropium-albuterol  1 ampule Inhalation Q4H WA    chlorhexidine  15 mL Mouth/Throat BID      Infusions:    dexmedetomidine HCl in NaCl 0.4 mcg/kg/hr (02/19/23 0623)    dextrose      sodium chloride 100 mL/hr at 02/18/23 1900    propofol Stopped (02/18/23 1122)     PRN Meds: albuterol, 2.5 mg, Q2H PRN  heparin flush, 240 Units, PRN  fentanNYL, 50 mcg, Q1H PRN  midazolam, 1 mg, Q2H PRN  glucose, 4 tablet, PRN  dextrose bolus, 125 mL, PRN   Or  dextrose bolus, 250 mL, PRN  glucagon (rDNA), 1 mg, PRN  dextrose, , Continuous PRN  sodium chloride flush, 10 mL, PRN  sodium chloride, , PRN      Labs      Recent Results (from the past 24 hour(s))   POCT Glucose    Collection Time: 02/18/23  2:22 PM   Result Value Ref Range    POC Glucose 242 (H) 70 - 99 MG/DL   POCT Glucose    Collection Time: 02/18/23  8:20 PM   Result Value Ref Range    POC Glucose 224 (H) 70 - 99 MG/DL   POCT Glucose    Collection Time: 02/19/23  1:28 AM   Result Value Ref Range    POC Glucose 261 (H) 70 - 99 MG/DL   CBC with Auto Differential    Collection Time: 02/19/23  4:00 AM   Result Value Ref Range    WBC 8.3 4.0 - 10.5 K/CU MM    RBC 2.94 (L) 4.6 - 6.2 M/CU MM    Hemoglobin 7.7 (L) 13.5 - 18.0 GM/DL    Hematocrit 25.3 (L) 42 - 52 %    MCV 86.1 78 - 100 FL    MCH 26.2 (L) 27 - 31 PG    MCHC 30.4 (L) 32.0 - 36.0 %    RDW 19.6 (H) 11.7 - 14.9 %    Platelets 153 336 - 655 K/CU MM    MPV 11.0 7.5 - 11.1 FL    Differential Type AUTOMATED DIFFERENTIAL     Segs Relative 78.8 (H) 36 - 66 %    Lymphocytes % 11.3 (L) 24 - 44 %    Monocytes % 8.0 (H) 0 - 4 %    Eosinophils % 1.3 0 - 3 %    Basophils % 0.2 0 - 1 %    Segs Absolute 6.5 K/CU MM    Lymphocytes Absolute 0.9 K/CU MM    Monocytes Absolute 0.7 K/CU MM    Eosinophils Absolute 0.1 K/CU MM    Basophils Absolute 0.0 K/CU MM    Nucleated RBC % 0.0 %    Total Nucleated RBC 0.0 K/CU MM    Total Immature Neutrophil 0.03 K/CU MM    Immature Neutrophil % 0.4 0 - 0.43 %   Comprehensive Metabolic Panel    Collection Time: 02/19/23  4:00 AM   Result Value Ref Range    Sodium 133 (L) 135 - 145 MMOL/L    Potassium 4.4 3.5 - 5.1 MMOL/L    Chloride 97 (L) 99 - 110 mMol/L    CO2 27 21 - 32 MMOL/L    BUN 44 (H) 6 - 23 MG/DL    Creatinine 3.2 (H) 0.9 - 1.3 MG/DL    Est, Glom Filt Rate 20 (L) >60 mL/min/1.73m2    Glucose 212 (H) 70 - 99 MG/DL    Calcium 7.7 (L) 8.3 - 10.6 MG/DL    Albumin 2.5 (L) 3.4 - 5.0 GM/DL    Total Protein 5.7 (L) 6.4 - 8.2 GM/DL    Total Bilirubin 0.7 0.0 - 1.0 MG/DL    ALT 7 (L) 10 - 40 U/L    AST 15 15 - 37 IU/L    Alkaline Phosphatase 74 40 - 128 IU/L    Anion Gap 9 4 - 16   Magnesium    Collection Time: 02/19/23  4:00 AM   Result Value Ref Range    Magnesium 1.9 1.8 - 2.4 mg/dl   Phosphorus    Collection Time: 02/19/23  4:00 AM   Result Value Ref Range    Phosphorus 3.2 2.5 - 4.9 MG/DL        Imaging/Diagnostics Last 24 Hours   XR CHEST PORTABLE    Result Date: 2/11/2023  EXAMINATION: ONE XRAY VIEW OF THE CHEST 2/11/2023 7:29 pm COMPARISON: Chest radiograph 02/11/2023 HISTORY: ORDERING SYSTEM PROVIDED HISTORY: ETT placement TECHNOLOGIST PROVIDED HISTORY: Reason for exam:->ETT placement Reason for Exam: et and og tube placement confirmation Additional signs and symptoms: et and og tube placement confrimation FINDINGS: Lines and tubes: -ETT terminates at the superior margin of the clavicles. -enteric tube takes a the subdiaphragmatic course and terminates out of the field of view -right-sided Cordis catheter, which terminates within the mid/upper SVC Lungs: There is indistinctness of the pulmonary vasculature with patchy opacities within the right greater than left lungs. . Pleura: Small right-sided pleural effusion. Cardiomediastinal silhouette: Enlarged cardiac silhouette. Bones: No acute osseous findings. Soft tissues: Left-sided 2 lead cardiac device. Lines and tubes as above. The ETT terminates at the level of the superior margin of the clavicles. Grossly unchanged pulmonary exam.  Findings favor cardiogenic pulmonary edema. However a superimposed infectious process cannot be excluded.        Electronically signed by Jacob Guerrero MD on 2/19/2023 at 7:47 AM

## 2023-02-19 NOTE — PROGRESS NOTES
Nephrology Progress Note  2/19/2023 8:33 AM        Subjective:   Admit Date: 2/11/2023  PCP: Junior Tracy    Interval History: Remains on mechanical ventilation but no major event    Diet: Tube feed per nasogastric tube at 65 mL an hour    ROS: With assist control mode, FiO2 40% PEEP of 5  Excellent urine output more than 3 L for the last 24 hours although with heavy diuretics  No fever, acceptable blood pressure  Small dose of Precedex    Data:     Current meds:    sodium chloride (Inhalant)  4 mL Nebulization Q4H    furosemide  80 mg IntraVENous TID    chlorothiazide (DIURIL) IVPB  500 mg IntraVENous Q12H    cefepime  1,000 mg IntraVENous Q12H    linezolid  600 mg IntraVENous Q12H    famotidine  20 mg Per NG tube Daily    heparin (porcine)  5,000 Units SubCUTAneous 3 times per day    sodium hypochlorite   Irrigation Daily    collagenase   Topical Daily    sodium chloride flush  5-40 mL IntraVENous 2 times per day    aspirin  81 mg Oral Daily    [Held by provider] clopidogrel  75 mg Oral Daily    ipratropium-albuterol  1 ampule Inhalation Q4H WA    chlorhexidine  15 mL Mouth/Throat BID      dexmedetomidine HCl in NaCl 0.4 mcg/kg/hr (02/19/23 3061)    dextrose      sodium chloride 100 mL/hr at 02/18/23 1900    propofol Stopped (02/18/23 1122)         I/O last 3 completed shifts: In: 4048.7 [I.V.:320.3; NG/GT:2587; IV Piggyback:1141.3]  Out: 5550 [GFZIJ:0414;  Chest Tube:200]    CBC:   Recent Labs     02/17/23  0320 02/18/23  0345 02/19/23  0400   WBC 6.4 7.4 8.3   HGB 7.4* 7.4* 7.7*   * 126* 148          Recent Labs     02/17/23  0320 02/18/23  0345 02/19/23  0400    133* 133*   K 4.0 4.2 4.4    99 97*   CO2 27 26 27   BUN 26* 37* 44*   CREATININE 3.0* 3.1* 3.2*   GLUCOSE 181* 200* 212*       Lab Results   Component Value Date    CALCIUM 7.7 (L) 02/19/2023    PHOS 3.2 02/19/2023       Objective:     Vitals: BP (!) 120/52   Pulse 60   Temp 98.4 °F (36.9 °C) (Oral)   Resp 22   Ht 6' (1.829 m)   Wt 284 lb 2.8 oz (128.9 kg)   SpO2 99%   BMI 38.54 kg/m² ,    General appearance: Intubated, does not respond to verbal or minimally noxious stimuli, he is minimally sedated  HEENT: Positive conjunctival pallor, right IJ temporary dialysis catheter  Neck: Supple  Lungs: Positive adventitious breath sound but limited exam-right pleural space chest wall  Heart: Seems regular rate and rhythm, left chest wall implanted cardiac device  Abdomen: Soft  Extremities: Bilateral 2+ thigh edema and he has Fuller catheter and chronic leg wound      Problem List :         Impression :     Acute kidney injury on top of CKD stage G4 A3-excellent urine output-response to diuretics suggest recovery of tubular injury-hopefully will also have some solute clearance along with water-I would continue all diuretics for now and adjust as he does  Septic shock is suspected due to lung and skin and soft tissue infection-he does have underlying atherosclerotic cardiovascular disease diabetes and likely immunosuppressive state  Acute on chronic leg edema multifactorial of course    Recommendation/Plan  :     I will keep both loop and thiazide for now-reevaluate tomorrow and adjust the dose as clinically necessary-hopefully on have to do extracorporal therapy for him tomorrow or in the near future-I hope his kidney function will improve enough to avoid kidney supportive therapy-I also hoping that he can be extubated-in the near future-we will watch for iatrogenic nosocomial complication continue supportive care-he of course remains at risk for decubitus ulcer, line infection, other organ infection      Melinda Li MD MD

## 2023-02-19 NOTE — PROGRESS NOTES
I have personally seen and examined the patient independently. I have reviewed the patient's available data,including medical history and recent test results. Reviewed and discussed note as documented by WAGNER. I agree with the physical exam findings, assessment and plan. My documented complex medical decisions constitute a substantive portion of the supervisory note. 40 minutes    Acute respiratory failure with hypoxia, hypercapnia  Hypervolemia/volume overload, mild pulmonary edema  Acute kidney injury (superimposed on CKD stage III), currently requires dialytic support (transitioned to intermittent)  Hypervolemia/pulmonary edema due to above  Coronary artery disease  History of chronic diastolic heart failure (suspect acute on chronic)  ICD  History of colon resection for colon cancer  Diabetes mellitus  Severe peripheral vascular disease, purulent wounds noted both lower extremities    Continue full ventilatory support, attempted trial of spontaneous breathing poorly tolerated (dys-synchronous, desaturation), reattempt on a daily basis. Continue chest tube right chest (placed 2/16/2023 given large pleural effusion, suspected transudate)  Dialysis/volume removal per nephrology service (note \"trial\" of high dose loop diuretic)  Antimicrobial therapy for treatment of lower extremity wound infections, adjusted to cover MRSA, Pseudomonas  Glycemic control  Oral nutritional support  Ulcer, DVT prophylaxis      Complex medical decisions required for today's evaluation and management reviewed in detail during critical care rounds. I foresee that this patient will likely require tracheostomy, feeding tube placement, ongoing dialytic support if the family/POA wish to continue aggressive medical measures should he not successfully withdraw from mechanical ventilatory support nor regain renal function.     Toula Frankel  [phone]

## 2023-02-19 NOTE — PLAN OF CARE
Problem: Discharge Planning  Goal: Discharge to home or other facility with appropriate resources  Outcome: Progressing     Problem: Pain  Goal: Verbalizes/displays adequate comfort level or baseline comfort level  Outcome: Progressing     Problem: Confusion  Goal: Confusion, delirium, dementia, or psychosis is improved or at baseline  Description: INTERVENTIONS:  1. Assess for possible contributors to thought disturbance, including medications, impaired vision or hearing, underlying metabolic abnormalities, dehydration, psychiatric diagnoses, and notify attending LIP  2. [place] high risk fall precautions, as indicated  3. Provide frequent short contacts to provide reality reorientation, refocusing and direction  4. Decrease environmental stimuli, including noise as appropriate  5. Monitor and intervene to maintain adequate nutrition, hydration, elimination, sleep and activity  6. If unable to ensure safety without constant attention obtain sitter and review sitter guidelines with assigned personnel  7. Initiate Psychosocial CNS and Spiritual Care consult, as indicated  Outcome: Progressing     Problem: Chronic Conditions and Co-morbidities  Goal: Patient's chronic conditions and co-morbidity symptoms are monitored and maintained or improved  Outcome: Progressing     Problem: Nutrition Deficit:  Goal: Optimize nutritional status  Outcome: Progressing     Problem: Skin/Tissue Integrity  Goal: Absence of new skin breakdown  Description: 1. Monitor for areas of redness and/or skin breakdown  2. Assess vascular access sites hourly  3. Every 4-6 hours minimum:  Change oxygen saturation probe site  4. Every 4-6 hours:  If on nasal continuous positive airway pressure, respiratory therapy assess nares and determine need for appliance change or resting period.   Outcome: Progressing     Problem: Safety - Adult  Goal: Free from fall injury  Outcome: Progressing     Problem: ABCDS Injury Assessment  Goal: Absence of physical injury  Outcome: Progressing

## 2023-02-19 NOTE — PROGRESS NOTES
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 EEG obtained-no electrographic seizures/NCSE    CV - acute on chronic HF. EF 50-55% in . Resp - acute hypoxic hypercapneic resp failure. Intubated. SAT/SBT trials as able. Sedated on propofol. Continue DuoNebs, pulmonary hygiene. Continue right sided chest tube. GI - Tolerating tube feed. GI prophylaxis      - NANCY on CKD 3/. Baseline Cr 2.2. adm Cr 4.8. sCr now 3.2; CRRT has been stopped. Nephrology following. Urology consulted for difficult parkinson; Distal urethral stricture with incomplete bladder emptying-cystoscopy/TRUS as outpatient if voiding issues persist, voiding trial prior to discharge  On thiazide and loop diuretics-Diuril 500 mg twice daily, Lasix 80 mg 3 times daily started on -with total UOP-320 0 cc (net -500 cc)  Continue diuretic challenge per nephrology     ID -Bilateral lower extremity wounds; wound cultures on -positive for Alcaligenes faecalis, Staph aureus MRSA, Finegoldia magna. Antibiotics changed to cefepime and Zyvox continue.   White blood count stable   Wound culture positive for Pseudomonas aeruginosa, Citrobacter Freundii, MRSA, Enterococcus faecalis     Heme -hemoglobin-stable. Between 7- 8 mg/dL     MSK - LE wounds bilaterally. Wound care consult. Wet-dry for now with xeroform. Antibiotics as above. Diet Diet NPO  ADULT TUBE FEEDING; Nasogastric; Peptide Based High Protein; Continuous; 20; Yes; 10; Q 4 hours; 65; 30; Q 4 hours   DVT Prophylaxis [] Lovenox, [x]  Heparin, [] SCDs, [] Ambulation,  [] Eliquis, [] Xarelto  [] Coumadin, GI PPX- Pepcid   Code Status Full Code   Disposition From: Home  Expected Disposition: TBD  Estimated Date of Discharge: TBD  Patient requires continued admission due to respiratory failure   Surrogate Decision Maker/ POA Child     Subjective:      Patient seen and examined this morning. On mechanical ventilator, tolerating SBT trials, propofol switched to Precedex, no signs of agitation/tachypnea/vent dyssynchrony  Labs reviewed  Not following commands     Review of Systems:    Review of Systems  unable to obtain complete ROS secondary to patient's mental status and being on mechanical ventilator      Objective: Intake/Output Summary (Last 24 hours) at 2/19/2023 0904  Last data filed at 2/19/2023 0700  Gross per 24 hour   Intake 2694.7 ml   Output 3240 ml   Net -545.3 ml          Vitals:   Vitals:    02/19/23 0902   BP: (!) 127/59   Pulse: 60   Resp: 23   Temp:    SpO2: 99%       Physical Exam:     General: Ill-appearing male, NAD  Eyes: EOMI  ENT: neck supple, ETT, NGT  Cardiovascular: Regular rate. Respiratory: Scattered rhonchi, no wheezing. On ventilator. Gastrointestinal: Obese, soft, non tender  Genitourinary: no suprapubic tenderness, Fuller catheter in place.   Musculoskeletal: 2+ lower extremity edema, chronic wounds on bilateral lower extremities- dressing clean/dry/intact  Skin: warm, dry  Neuro: Currently ventilator, sedated, not following commands, cranial nerves grossly intact  psych: Unable to assess    Medications:   Medications:    sodium chloride (Inhalant)  4 mL Nebulization Q4H    furosemide  80 mg IntraVENous TID    chlorothiazide (DIURIL) IVPB  500 mg IntraVENous Q12H    cefepime  1,000 mg IntraVENous Q12H    linezolid  600 mg IntraVENous Q12H    famotidine  20 mg Per NG tube Daily    heparin (porcine)  5,000 Units SubCUTAneous 3 times per day    sodium hypochlorite   Irrigation Daily    collagenase   Topical Daily    sodium chloride flush  5-40 mL IntraVENous 2 times per day    aspirin  81 mg Oral Daily    [Held by provider] clopidogrel  75 mg Oral Daily    ipratropium-albuterol  1 ampule Inhalation Q4H WA    chlorhexidine  15 mL Mouth/Throat BID      Infusions:    dexmedetomidine HCl in NaCl 0.4 mcg/kg/hr (02/19/23 0623)    dextrose      sodium chloride 100 mL/hr at 02/18/23 1900    propofol Stopped (02/18/23 1122)     PRN Meds: albuterol, 2.5 mg, Q2H PRN  heparin flush, 240 Units, PRN  fentanNYL, 50 mcg, Q1H PRN  midazolam, 1 mg, Q2H PRN  glucose, 4 tablet, PRN  dextrose bolus, 125 mL, PRN   Or  dextrose bolus, 250 mL, PRN  glucagon (rDNA), 1 mg, PRN  dextrose, , Continuous PRN  sodium chloride flush, 10 mL, PRN  sodium chloride, , PRN      Labs      Recent Results (from the past 24 hour(s))   POCT Glucose    Collection Time: 02/18/23  2:22 PM   Result Value Ref Range    POC Glucose 242 (H) 70 - 99 MG/DL   POCT Glucose    Collection Time: 02/18/23  8:20 PM   Result Value Ref Range    POC Glucose 224 (H) 70 - 99 MG/DL   POCT Glucose    Collection Time: 02/19/23  1:28 AM   Result Value Ref Range    POC Glucose 261 (H) 70 - 99 MG/DL   CBC with Auto Differential    Collection Time: 02/19/23  4:00 AM   Result Value Ref Range    WBC 8.3 4.0 - 10.5 K/CU MM    RBC 2.94 (L) 4.6 - 6.2 M/CU MM    Hemoglobin 7.7 (L) 13.5 - 18.0 GM/DL    Hematocrit 25.3 (L) 42 - 52 %    MCV 86.1 78 - 100 FL    MCH 26.2 (L) 27 - 31 PG    MCHC 30.4 (L) 32.0 - 36.0 %    RDW 19.6 (H) 11.7 - 14.9 %    Platelets 260 809 - 757 K/CU MM    MPV 11.0 7.5 - 11.1 FL    Differential Type AUTOMATED DIFFERENTIAL     Segs Relative 78.8 (H) 36 - 66 %    Lymphocytes % 11.3 (L) 24 - 44 %    Monocytes % 8.0 (H) 0 - 4 %    Eosinophils % 1.3 0 - 3 %    Basophils % 0.2 0 - 1 %    Segs Absolute 6.5 K/CU MM    Lymphocytes Absolute 0.9 K/CU MM    Monocytes Absolute 0.7 K/CU MM    Eosinophils Absolute 0.1 K/CU MM    Basophils Absolute 0.0 K/CU MM    Nucleated RBC % 0.0 %    Total Nucleated RBC 0.0 K/CU MM    Total Immature Neutrophil 0.03 K/CU MM    Immature Neutrophil % 0.4 0 - 0.43 %   Comprehensive Metabolic Panel    Collection Time: 02/19/23  4:00 AM   Result Value Ref Range    Sodium 133 (L) 135 - 145 MMOL/L    Potassium 4.4 3.5 - 5.1 MMOL/L    Chloride 97 (L) 99 - 110 mMol/L    CO2 27 21 - 32 MMOL/L    BUN 44 (H) 6 - 23 MG/DL    Creatinine 3.2 (H) 0.9 - 1.3 MG/DL    Est, Glom Filt Rate 20 (L) >60 mL/min/1.73m2    Glucose 212 (H) 70 - 99 MG/DL    Calcium 7.7 (L) 8.3 - 10.6 MG/DL    Albumin 2.5 (L) 3.4 - 5.0 GM/DL    Total Protein 5.7 (L) 6.4 - 8.2 GM/DL    Total Bilirubin 0.7 0.0 - 1.0 MG/DL    ALT 7 (L) 10 - 40 U/L    AST 15 15 - 37 IU/L    Alkaline Phosphatase 74 40 - 128 IU/L    Anion Gap 9 4 - 16   Magnesium    Collection Time: 02/19/23  4:00 AM   Result Value Ref Range    Magnesium 1.9 1.8 - 2.4 mg/dl   Phosphorus    Collection Time: 02/19/23  4:00 AM   Result Value Ref Range    Phosphorus 3.2 2.5 - 4.9 MG/DL   POCT Glucose    Collection Time: 02/19/23  7:53 AM   Result Value Ref Range    POC Glucose 238 (H) 70 - 99 MG/DL        Imaging/Diagnostics Last 24 Hours   XR CHEST PORTABLE    Result Date: 2/11/2023  EXAMINATION: ONE XRAY VIEW OF THE CHEST 2/11/2023 7:29 pm COMPARISON: Chest radiograph 02/11/2023 HISTORY: ORDERING SYSTEM PROVIDED HISTORY: ETT placement TECHNOLOGIST PROVIDED HISTORY: Reason for exam:->ETT placement Reason for Exam: et and og tube placement confirmation Additional signs and symptoms: et and og tube placement confrimation FINDINGS: Lines and tubes: -ETT terminates at the superior margin of the clavicles. -enteric tube takes a the subdiaphragmatic course and terminates out of the field of view -right-sided Cordis catheter, which terminates within the mid/upper SVC Lungs: There is indistinctness of the pulmonary vasculature with patchy opacities within the right greater than left lungs. . Pleura: Small right-sided pleural effusion. Cardiomediastinal silhouette: Enlarged cardiac silhouette. Bones: No acute osseous findings. Soft tissues: Left-sided 2 lead cardiac device. Lines and tubes as above. The ETT terminates at the level of the superior margin of the clavicles. Grossly unchanged pulmonary exam.  Findings favor cardiogenic pulmonary edema. However a superimposed infectious process cannot be excluded.      Total critical care time 35 minutes  Critical care time does not include separately billable procedures  Patient is critical due to acute hypoxic respiratory failure, NANCY, acute encephalopathy,  During this time I reviewed labs, imaging, discussed with physician during rounds regarding plan of care    Electronically signed by Reta Patiño PA-C on 2/19/2023 at 9:04 AM

## 2023-02-19 NOTE — PLAN OF CARE
Problem: Discharge Planning  Goal: Discharge to home or other facility with appropriate resources  Outcome: Progressing     Problem: Pain  Goal: Verbalizes/displays adequate comfort level or baseline comfort level  Outcome: Progressing     Problem: Confusion  Goal: Confusion, delirium, dementia, or psychosis is improved or at baseline  Description: INTERVENTIONS:  1. Assess for possible contributors to thought disturbance, including medications, impaired vision or hearing, underlying metabolic abnormalities, dehydration, psychiatric diagnoses, and notify attending LIP  2. [place] high risk fall precautions, as indicated  3. Provide frequent short contacts to provide reality reorientation, refocusing and direction  4. Decrease environmental stimuli, including noise as appropriate  5. Monitor and intervene to maintain adequate nutrition, hydration, elimination, sleep and activity  6. If unable to ensure safety without constant attention obtain sitter and review sitter guidelines with assigned personnel  7. Initiate Psychosocial CNS and Spiritual Care consult, as indicated  Outcome: Progressing     Problem: Chronic Conditions and Co-morbidities  Goal: Patient's chronic conditions and co-morbidity symptoms are monitored and maintained or improved  Outcome: Progressing     Problem: Nutrition Deficit:  Goal: Optimize nutritional status  Outcome: Progressing     Problem: Skin/Tissue Integrity  Goal: Absence of new skin breakdown  Description: 1. Monitor for areas of redness and/or skin breakdown  2. Assess vascular access sites hourly  3. Every 4-6 hours minimum:  Change oxygen saturation probe site  4. Every 4-6 hours:  If on nasal continuous positive airway pressure, respiratory therapy assess nares and determine need for appliance change or resting period.   Outcome: Progressing     Problem: Safety - Adult  Goal: Free from fall injury  Outcome: Progressing     Problem: ABCDS Injury Assessment  Goal: Absence of physical injury  Outcome: Progressing

## 2023-02-19 NOTE — PLAN OF CARE
Problem: Discharge Planning  Goal: Discharge to home or other facility with appropriate resources  2/18/2023 2019 by Taryn Boateng RN  Outcome: Progressing  2/18/2023 1916 by Gordon Long RN  Outcome: Progressing     Problem: Pain  Goal: Verbalizes/displays adequate comfort level or baseline comfort level  2/18/2023 2019 by Taryn Boateng RN  Outcome: Progressing  2/18/2023 1916 by Gordon Long RN  Outcome: Progressing     Problem: Confusion  Goal: Confusion, delirium, dementia, or psychosis is improved or at baseline  Description: INTERVENTIONS:  1. Assess for possible contributors to thought disturbance, including medications, impaired vision or hearing, underlying metabolic abnormalities, dehydration, psychiatric diagnoses, and notify attending LIP  2. [place] high risk fall precautions, as indicated  3. Provide frequent short contacts to provide reality reorientation, refocusing and direction  4. Decrease environmental stimuli, including noise as appropriate  5. Monitor and intervene to maintain adequate nutrition, hydration, elimination, sleep and activity  6. If unable to ensure safety without constant attention obtain sitter and review sitter guidelines with assigned personnel  7.  Initiate Psychosocial CNS and Spiritual Care consult, as indicated  2/18/2023 2019 by Taryn Boateng RN  Outcome: Progressing  2/18/2023 1916 by Gordon Long RN  Outcome: Progressing     Problem: Chronic Conditions and Co-morbidities  Goal: Patient's chronic conditions and co-morbidity symptoms are monitored and maintained or improved  2/18/2023 2019 by Taryn Boateng RN  Outcome: Progressing  2/18/2023 1916 by Gordon Long RN  Outcome: Progressing     Problem: Nutrition Deficit:  Goal: Optimize nutritional status  2/18/2023 2019 by Taryn Boateng RN  Outcome: Progressing  2/18/2023 1916 by Gordon Long RN  Outcome: Progressing     Problem: Skin/Tissue Integrity  Goal: Absence of new skin breakdown  Description: 1. Monitor for areas of redness and/or skin breakdown  2. Assess vascular access sites hourly  3. Every 4-6 hours minimum:  Change oxygen saturation probe site  4. Every 4-6 hours:  If on nasal continuous positive airway pressure, respiratory therapy assess nares and determine need for appliance change or resting period.   2/18/2023 2019 by Mela Baumgarten, RN  Outcome: Progressing  2/18/2023 1916 by Oliverio Gomez RN  Outcome: Progressing     Problem: Safety - Adult  Goal: Free from fall injury  2/18/2023 2019 by Mela Baumgarten, RN  Outcome: Progressing  2/18/2023 1916 by Oliverio Gomez RN  Outcome: Progressing     Problem: ABCDS Injury Assessment  Goal: Absence of physical injury  2/18/2023 2019 by Mela Baumgarten, RN  Outcome: Progressing  2/18/2023 1916 by Oliverio Gomez RN  Outcome: Progressing

## 2023-02-20 LAB
ALBUMIN SERPL-MCNC: 2.5 GM/DL (ref 3.4–5)
ALP BLD-CCNC: 81 IU/L (ref 40–129)
ALT SERPL-CCNC: 8 U/L (ref 10–40)
ANION GAP SERPL CALCULATED.3IONS-SCNC: 7 MMOL/L (ref 4–16)
AST SERPL-CCNC: 14 IU/L (ref 15–37)
BILIRUB SERPL-MCNC: 0.5 MG/DL (ref 0–1)
BUN SERPL-MCNC: 54 MG/DL (ref 6–23)
CALCIUM SERPL-MCNC: 8.1 MG/DL (ref 8.3–10.6)
CHLORIDE BLD-SCNC: 95 MMOL/L (ref 99–110)
CO2: 29 MMOL/L (ref 21–32)
CREAT SERPL-MCNC: 2.9 MG/DL (ref 0.9–1.3)
GFR SERPL CREATININE-BSD FRML MDRD: 22 ML/MIN/1.73M2
GLUCOSE BLD-MCNC: 163 MG/DL (ref 70–99)
GLUCOSE BLD-MCNC: 176 MG/DL (ref 70–99)
GLUCOSE BLD-MCNC: 236 MG/DL (ref 70–99)
GLUCOSE BLD-MCNC: 252 MG/DL (ref 70–99)
GLUCOSE BLD-MCNC: 291 MG/DL (ref 70–99)
GLUCOSE SERPL-MCNC: 270 MG/DL (ref 70–99)
POTASSIUM SERPL-SCNC: 4.1 MMOL/L (ref 3.5–5.1)
SODIUM BLD-SCNC: 131 MMOL/L (ref 135–145)
TOTAL PROTEIN: 6.2 GM/DL (ref 6.4–8.2)

## 2023-02-20 PROCEDURE — 80053 COMPREHEN METABOLIC PANEL: CPT

## 2023-02-20 PROCEDURE — 2500000003 HC RX 250 WO HCPCS: Performed by: NURSE PRACTITIONER

## 2023-02-20 PROCEDURE — 6360000002 HC RX W HCPCS: Performed by: INTERNAL MEDICINE

## 2023-02-20 PROCEDURE — 2580000003 HC RX 258: Performed by: NURSE PRACTITIONER

## 2023-02-20 PROCEDURE — 99232 SBSQ HOSP IP/OBS MODERATE 35: CPT | Performed by: PHYSICIAN ASSISTANT

## 2023-02-20 PROCEDURE — 6370000000 HC RX 637 (ALT 250 FOR IP): Performed by: NURSE PRACTITIONER

## 2023-02-20 PROCEDURE — 2700000000 HC OXYGEN THERAPY PER DAY

## 2023-02-20 PROCEDURE — 6360000002 HC RX W HCPCS: Performed by: SPECIALIST

## 2023-02-20 PROCEDURE — 94640 AIRWAY INHALATION TREATMENT: CPT

## 2023-02-20 PROCEDURE — 2580000003 HC RX 258: Performed by: STUDENT IN AN ORGANIZED HEALTH CARE EDUCATION/TRAINING PROGRAM

## 2023-02-20 PROCEDURE — 82962 GLUCOSE BLOOD TEST: CPT

## 2023-02-20 PROCEDURE — 94761 N-INVAS EAR/PLS OXIMETRY MLT: CPT

## 2023-02-20 PROCEDURE — 2000000000 HC ICU R&B

## 2023-02-20 PROCEDURE — 6360000002 HC RX W HCPCS: Performed by: NURSE PRACTITIONER

## 2023-02-20 PROCEDURE — 2580000003 HC RX 258: Performed by: INTERNAL MEDICINE

## 2023-02-20 PROCEDURE — 6370000000 HC RX 637 (ALT 250 FOR IP): Performed by: STUDENT IN AN ORGANIZED HEALTH CARE EDUCATION/TRAINING PROGRAM

## 2023-02-20 PROCEDURE — 6360000002 HC RX W HCPCS: Performed by: STUDENT IN AN ORGANIZED HEALTH CARE EDUCATION/TRAINING PROGRAM

## 2023-02-20 PROCEDURE — 94003 VENT MGMT INPAT SUBQ DAY: CPT

## 2023-02-20 PROCEDURE — 89220 SPUTUM SPECIMEN COLLECTION: CPT

## 2023-02-20 RX ORDER — MIDAZOLAM HYDROCHLORIDE 2 MG/2ML
1 INJECTION, SOLUTION INTRAMUSCULAR; INTRAVENOUS
Status: DISCONTINUED | OUTPATIENT
Start: 2023-02-20 | End: 2023-02-21

## 2023-02-20 RX ORDER — INSULIN GLARGINE 100 [IU]/ML
5 INJECTION, SOLUTION SUBCUTANEOUS NIGHTLY
Status: DISCONTINUED | OUTPATIENT
Start: 2023-02-20 | End: 2023-02-27

## 2023-02-20 RX ADMIN — DEXMEDETOMIDINE HYDROCHLORIDE 0.8 MCG/KG/HR: 4 INJECTION, SOLUTION INTRAVENOUS at 05:26

## 2023-02-20 RX ADMIN — COLLAGENASE SANTYL: 250 OINTMENT TOPICAL at 08:46

## 2023-02-20 RX ADMIN — HEPARIN SODIUM 5000 UNITS: 5000 INJECTION INTRAVENOUS; SUBCUTANEOUS at 05:44

## 2023-02-20 RX ADMIN — CHLOROTHIAZIDE SODIUM 500 MG: 500 INJECTION, POWDER, LYOPHILIZED, FOR SOLUTION INTRAVENOUS at 18:43

## 2023-02-20 RX ADMIN — Medication 4 ML: at 23:38

## 2023-02-20 RX ADMIN — SODIUM CHLORIDE, PRESERVATIVE FREE 10 ML: 5 INJECTION INTRAVENOUS at 20:48

## 2023-02-20 RX ADMIN — CEFEPIME HYDROCHLORIDE 2000 MG: 2 INJECTION, POWDER, FOR SOLUTION INTRAVENOUS at 11:38

## 2023-02-20 RX ADMIN — LINEZOLID 600 MG: 600 INJECTION, SOLUTION INTRAVENOUS at 11:37

## 2023-02-20 RX ADMIN — FUROSEMIDE 80 MG: 10 INJECTION, SOLUTION INTRAMUSCULAR; INTRAVENOUS at 20:47

## 2023-02-20 RX ADMIN — Medication 4 ML: at 11:17

## 2023-02-20 RX ADMIN — INSULIN LISPRO 4 UNITS: 100 INJECTION, SOLUTION INTRAVENOUS; SUBCUTANEOUS at 11:44

## 2023-02-20 RX ADMIN — SODIUM HYPOCHLORITE: 1.25 SOLUTION TOPICAL at 21:06

## 2023-02-20 RX ADMIN — CHLOROTHIAZIDE SODIUM 500 MG: 500 INJECTION, POWDER, LYOPHILIZED, FOR SOLUTION INTRAVENOUS at 05:39

## 2023-02-20 RX ADMIN — FAMOTIDINE 20 MG: 20 TABLET ORAL at 08:46

## 2023-02-20 RX ADMIN — IPRATROPIUM BROMIDE AND ALBUTEROL SULFATE 1 AMPULE: 2.5; .5 SOLUTION RESPIRATORY (INHALATION) at 16:46

## 2023-02-20 RX ADMIN — LINEZOLID 600 MG: 600 INJECTION, SOLUTION INTRAVENOUS at 22:07

## 2023-02-20 RX ADMIN — DEXMEDETOMIDINE HYDROCHLORIDE 0.8 MCG/KG/HR: 4 INJECTION, SOLUTION INTRAVENOUS at 09:26

## 2023-02-20 RX ADMIN — IPRATROPIUM BROMIDE AND ALBUTEROL SULFATE 1 AMPULE: 2.5; .5 SOLUTION RESPIRATORY (INHALATION) at 08:09

## 2023-02-20 RX ADMIN — FUROSEMIDE 80 MG: 10 INJECTION, SOLUTION INTRAMUSCULAR; INTRAVENOUS at 14:36

## 2023-02-20 RX ADMIN — CEFEPIME HYDROCHLORIDE 2000 MG: 2 INJECTION, POWDER, FOR SOLUTION INTRAVENOUS at 23:05

## 2023-02-20 RX ADMIN — ASPIRIN 81 MG CHEWABLE TABLET 81 MG: 81 TABLET CHEWABLE at 08:46

## 2023-02-20 RX ADMIN — Medication 4 ML: at 16:47

## 2023-02-20 RX ADMIN — FUROSEMIDE 80 MG: 10 INJECTION, SOLUTION INTRAMUSCULAR; INTRAVENOUS at 08:46

## 2023-02-20 RX ADMIN — SODIUM HYPOCHLORITE: 1.25 SOLUTION TOPICAL at 08:46

## 2023-02-20 RX ADMIN — HEPARIN SODIUM 5000 UNITS: 5000 INJECTION INTRAVENOUS; SUBCUTANEOUS at 21:05

## 2023-02-20 RX ADMIN — INSULIN LISPRO 2 UNITS: 100 INJECTION, SOLUTION INTRAVENOUS; SUBCUTANEOUS at 08:46

## 2023-02-20 RX ADMIN — HEPARIN SODIUM 5000 UNITS: 5000 INJECTION INTRAVENOUS; SUBCUTANEOUS at 14:36

## 2023-02-20 RX ADMIN — CHLORHEXIDINE GLUCONATE 0.12% ORAL RINSE 15 ML: 1.2 LIQUID ORAL at 08:46

## 2023-02-20 RX ADMIN — IPRATROPIUM BROMIDE AND ALBUTEROL SULFATE 1 AMPULE: 2.5; .5 SOLUTION RESPIRATORY (INHALATION) at 11:18

## 2023-02-20 RX ADMIN — Medication 4 ML: at 03:49

## 2023-02-20 RX ADMIN — IPRATROPIUM BROMIDE AND ALBUTEROL SULFATE 1 AMPULE: 2.5; .5 SOLUTION RESPIRATORY (INHALATION) at 20:11

## 2023-02-20 RX ADMIN — Medication 4 ML: at 20:12

## 2023-02-20 RX ADMIN — INSULIN LISPRO 4 UNITS: 100 INJECTION, SOLUTION INTRAVENOUS; SUBCUTANEOUS at 04:23

## 2023-02-20 RX ADMIN — DEXMEDETOMIDINE HYDROCHLORIDE 0.6 MCG/KG/HR: 4 INJECTION, SOLUTION INTRAVENOUS at 00:17

## 2023-02-20 RX ADMIN — SODIUM CHLORIDE, PRESERVATIVE FREE 10 ML: 5 INJECTION INTRAVENOUS at 08:49

## 2023-02-20 ASSESSMENT — PULMONARY FUNCTION TESTS
PIF_VALUE: 20
PIF_VALUE: 20
PIF_VALUE: 22
PIF_VALUE: 15
PIF_VALUE: 20
PIF_VALUE: 21
PIF_VALUE: 20
PIF_VALUE: 20
PIF_VALUE: 21
PIF_VALUE: 15
PIF_VALUE: 20
PIF_VALUE: 15
PIF_VALUE: 20

## 2023-02-20 NOTE — PROGRESS NOTES
Nephrology Progress Note  2/20/2023 8:24 AM        Subjective:   Admit Date: 2/11/2023  PCP: Zina Veronica History: Completely awake, alert and oriented and converses of course by body language and head nodding    Diet: Tube feed per nasogastric tube    ROS: Remains on assist control mode, FiO2 30% PEEP of 5  He is awake and alert and oriented  Urine output almost 3 L for the last 24 hours  No fever and no vasopressor    Data:     Current meds:    insulin lispro  0-8 Units SubCUTAneous Q4H    sodium chloride (Inhalant)  4 mL Nebulization Q4H    furosemide  80 mg IntraVENous TID    chlorothiazide (DIURIL) IVPB  500 mg IntraVENous Q12H    cefepime  1,000 mg IntraVENous Q12H    linezolid  600 mg IntraVENous Q12H    famotidine  20 mg Per NG tube Daily    heparin (porcine)  5,000 Units SubCUTAneous 3 times per day    sodium hypochlorite   Irrigation Daily    collagenase   Topical Daily    sodium chloride flush  5-40 mL IntraVENous 2 times per day    aspirin  81 mg Oral Daily    [Held by provider] clopidogrel  75 mg Oral Daily    ipratropium-albuterol  1 ampule Inhalation Q4H WA    chlorhexidine  15 mL Mouth/Throat BID      dexmedetomidine HCl in NaCl 0.8 mcg/kg/hr (02/20/23 0526)    dextrose      sodium chloride 100 mL/hr at 02/18/23 1900    propofol Stopped (02/18/23 1122)         I/O last 3 completed shifts: In: 4211.1 [I.V.:598.4; NG/GT:2493; IV Piggyback:1119.8]  Out: 9476 [Urine:3855;  Chest Tube:210]    CBC:   Recent Labs     02/18/23  0345 02/19/23  0400   WBC 7.4 8.3   HGB 7.4* 7.7*   * 148          Recent Labs     02/18/23  0345 02/19/23  0400 02/20/23  0530   * 133* 131*   K 4.2 4.4 4.1   CL 99 97* 95*   CO2 26 27 29   BUN 37* 44* 54*   CREATININE 3.1* 3.2* 2.9*   GLUCOSE 200* 212* 270*       Lab Results   Component Value Date    CALCIUM 8.1 (L) 02/20/2023    PHOS 3.2 02/19/2023       Objective:     Vitals: BP (!) 151/72   Pulse 60   Temp 98.5 °F (36.9 °C) (Oral)   Resp (!) 8   Ht 6' (1.829 m)   Wt 284 lb 2.8 oz (128.9 kg)   SpO2 100%   BMI 38.54 kg/m² ,    General appearance: Intubated but alert and awake  HEENT: Positive conjunctival pallor  Neck: Right IJ temporary dialysis catheter  Lungs: Anteriorly no gross crackles  Heart: Seems regular rate and rhythm, left chest wall implanted cardiac device  Abdomen: Soft  Extremities: Bilateral edema better  Has Fuller catheter      Problem List :         Impression :     Stage III acute kidney injury on top of CKD stage IV A3-nonoliguric and BUN/creatinine seems to plateau  Recent septic shock with respiratory failure hopeful extubation soon with multiorgan involvement also-lung, skin and soft tissue were suspected  Multiple other comorbid disease including but not limited to atherosclerotic cardiovascular disease-also aftermath of acute illness, respiratory failure and acute kidney injury    Recommendation/Plan  :     I will keep the diuretics for now-adjust as he dialysis-hopefully extubate soon-watch for iatrogenic nosocomial complication-start early rehabilitation-avoid any nonessential medication-follow clinically and biochemically      Jarrod Cardenas MD MD

## 2023-02-20 NOTE — PROGRESS NOTES
Patient extubated at this time per [facility] RT. Patient suctioned and transitioned to NC at this time. Patient tolerated well.

## 2023-02-20 NOTE — PROGRESS NOTES
V2.0  [facility] [place] Progress Note      Name:  Magno Ro /Age/Sex: 1950  (67 y.o. male)   MRN & CSN:  [id number] & [id number] Encounter Date/Time: 2023 4:09 PM EST    Location:  -A PCP: Ivette Zambrano Day: 10    Assessment and Plan:   Magno Ro is a 67 y.o. male who presents with Acute respiratory failure with hypoxia and hypercapnia (HCC)    Acute on chronic renal failure requiring intermittent dialysis support  Acute respiratory failure with hypoxia and hypercapnia, requiring intubation, extubated on  with immediate re-intubation, s/p extubation   In patient cardio respiratory arrest on   Acute metabolic encephalopathy  Large right-sided pleural effusion s/p chest tube placement on   Hyperkalemia-resolved  Acute metabolic encephalopathy  Chronic LE wounds- improving  HTN  CAD  Heart failure with preserved ejection fraction  Large right pleural effusion     Plan:     Neuro -extubated, tolerating well no focal neurological deficits verbalizing, following commands    CV - acute on chronic HF. EF 50-55% in . Resp - s/p extubation today , tolerating well on 4 L nasal cannula, cough and sputum production noted, continue DuoNebs and respiratory treatments, right sided chest tube. GI -speech evaluation tomorrow and may start on regular diet ,GI prophylaxis      - NANCY on CKD 3/4. Baseline Cr 2.2. adm Cr 4.8. sCr now 3.2; CRRT has been stopped. Nephrology following. Urology consulted for difficult parkinson;  Distal urethral stricture with incomplete bladder emptying-cystoscopy/TRUS as outpatient if voiding issues persist, voiding trial prior to discharge  On thiazide and loop diuretics-Diuril 500 mg twice daily, Lasix 80 mg 3 times daily started on -with total uop 1295 cc so far   Continue diuretics per nephrology     ID -Bilateral lower extremity wounds; wound cultures on -positive for Alcaligenes faecalis, Staph aureus MRSA, Finegoldia magna. Antibiotics changed to cefepime and Zyvox continue. White blood count stable   Wound culture positive for Pseudomonas aeruginosa, Citrobacter Freundii, MRSA, Enterococcus faecalis     Heme -hemoglobin-stable. Between 7- 8 mg/dL     MSK - LE wounds bilaterally. Wound care consult. Wet-dry for now with xeroform. Antibiotics as above, general surgery following      Diet Diet NPO  ADULT TUBE FEEDING; Nasogastric; Peptide Based High Protein; Continuous; 20; Yes; 10; Q 4 hours; 65; 30; Q 4 hours   DVT Prophylaxis [] Lovenox, [x]  Heparin, [] SCDs, [] Ambulation,  [] Eliquis, [] Xarelto  [] Coumadin, GI PPX- Pepcid   Code Status Full Code   Disposition From: Home  Expected Disposition: TBD  Estimated Date of Discharge: TBD  Patient requires continued admission due to respiratory failure   Surrogate Decision Maker/ POA Child     Subjective:      Patient seen and examined this morning. Extubated, tolerating well on 4 L nasal cannula,  Productive cough, continue breathing treatments  Continue diuresis  Review of Systems:    Review of Systems  Denies SOB, chest pain/pressure, nausea/vomiting/abdominal pain, focal neurological/motor or sensory changes    Objective: Intake/Output Summary (Last 24 hours) at 2/20/2023 1102  Last data filed at 2/20/2023 1028  Gross per 24 hour   Intake 2719.18 ml   Output 3355 ml   Net -635.82 ml          Vitals:   Vitals:    02/20/23 1001   BP: (!) 152/70   Pulse: 60   Resp:    Temp:    SpO2:        Physical Exam:     General: Patient is a 70-year-old male, NAD  Eyes: EOMI  ENT: neck supple,   Cardiovascular: Regular rate. Respiratory:bilateral rhonchi, no wheezing. Gastrointestinal: Obese, soft, non tender  Genitourinary: no suprapubic tenderness, Fuller catheter in place.   Musculoskeletal: 2+ lower extremity edema, chronic wounds on bilateral lower extremities- dressing clean/dry/intact  Skin: warm, dry  Neuro: Alert, no focal neurological deficits  psych: Pleasant    Medications:   Medications:    cefepime  2,000 mg IntraVENous Q12H    insulin lispro  0-8 Units SubCUTAneous Q4H    sodium chloride (Inhalant)  4 mL Nebulization Q4H    furosemide  80 mg IntraVENous TID    chlorothiazide (DIURIL) IVPB  500 mg IntraVENous Q12H    linezolid  600 mg IntraVENous Q12H    famotidine  20 mg Per NG tube Daily    heparin (porcine)  5,000 Units SubCUTAneous 3 times per day    sodium hypochlorite   Irrigation Daily    collagenase   Topical Daily    sodium chloride flush  5-40 mL IntraVENous 2 times per day    aspirin  81 mg Oral Daily    [Held by provider] clopidogrel  75 mg Oral Daily    ipratropium-albuterol  1 ampule Inhalation Q4H WA    chlorhexidine  15 mL Mouth/Throat BID      Infusions:    dexmedetomidine HCl in NaCl 0.8 mcg/kg/hr (02/20/23 0926)    dextrose      sodium chloride 100 mL/hr at 02/18/23 1900     PRN Meds: midazolam, 1 mg, Q2H PRN  albuterol, 2.5 mg, Q2H PRN  heparin flush, 240 Units, PRN  fentanNYL, 50 mcg, Q1H PRN  glucose, 4 tablet, PRN  dextrose bolus, 125 mL, PRN   Or  dextrose bolus, 250 mL, PRN  glucagon (rDNA), 1 mg, PRN  dextrose, , Continuous PRN  sodium chloride flush, 10 mL, PRN  sodium chloride, , PRN      Labs      Recent Results (from the past 24 hour(s))   POCT Glucose    Collection Time: 02/19/23  2:17 PM   Result Value Ref Range    POC Glucose 288 (H) 70 - 99 MG/DL   POCT Glucose    Collection Time: 02/19/23  7:50 PM   Result Value Ref Range    POC Glucose 301 (H) 70 - 99 MG/DL   POCT Glucose    Collection Time: 02/19/23 11:43 PM   Result Value Ref Range    POC Glucose 343 (H) 70 - 99 MG/DL   POCT Glucose    Collection Time: 02/20/23  3:28 AM   Result Value Ref Range    POC Glucose 291 (H) 70 - 99 MG/DL   Comprehensive Metabolic Panel    Collection Time: 02/20/23  5:30 AM   Result Value Ref Range    Sodium 131 (L) 135 - 145 MMOL/L    Potassium 4.1 3.5 - 5.1 MMOL/L    Chloride 95 (L) 99 - 110 mMol/L    CO2 29 21 - 32 MMOL/L    BUN 54 (H) 6 - 23 MG/DL    Creatinine 2.9 (H) 0.9 - 1.3 MG/DL    Est, Glom Filt Rate 22 (L) >60 mL/min/1.73m2    Glucose 270 (H) 70 - 99 MG/DL    Calcium 8.1 (L) 8.3 - 10.6 MG/DL    Albumin 2.5 (L) 3.4 - 5.0 GM/DL    Total Protein 6.2 (L) 6.4 - 8.2 GM/DL    Total Bilirubin 0.5 0.0 - 1.0 MG/DL    ALT 8 (L) 10 - 40 U/L    AST 14 (L) 15 - 37 IU/L    Alkaline Phosphatase 81 40 - 129 IU/L    Anion Gap 7 4 - 16   POCT Glucose    Collection Time: 02/20/23  8:31 AM   Result Value Ref Range    POC Glucose 236 (H) 70 - 99 MG/DL        Imaging/Diagnostics Last 24 Hours   XR CHEST PORTABLE    Result Date: 2/11/2023  EXAMINATION: ONE XRAY VIEW OF THE CHEST 2/11/2023 7:29 pm COMPARISON: Chest radiograph 02/11/2023 HISTORY: ORDERING SYSTEM PROVIDED HISTORY: ETT placement TECHNOLOGIST PROVIDED HISTORY: Reason for exam:->ETT placement Reason for Exam: et and og tube placement confirmation Additional signs and symptoms: et and og tube placement confrimation FINDINGS: Lines and tubes: -ETT terminates at the superior margin of the clavicles. -enteric tube takes a the subdiaphragmatic course and terminates out of the field of view -right-sided Cordis catheter, which terminates within the mid/upper SVC Lungs: There is indistinctness of the pulmonary vasculature with patchy opacities within the right greater than left lungs. . Pleura: Small right-sided pleural effusion. Cardiomediastinal silhouette: Enlarged cardiac silhouette. Bones: No acute osseous findings. Soft tissues: Left-sided 2 lead cardiac device. Lines and tubes as above. The ETT terminates at the level of the superior margin of the clavicles. Grossly unchanged pulmonary exam.  Findings favor cardiogenic pulmonary edema. However a superimposed infectious process cannot be excluded.      Total critical care time 35 minutes  Critical care time does not include separately billable procedures  Patient is critical due to acute hypoxic respiratory failure, NANCY, acute encephalopathy,  During this time I reviewed labs, imaging, discussed with physician during rounds regarding plan of care    Electronically signed by Duong Lion PA-C on 2/20/2023 at 11:02 AM

## 2023-02-20 NOTE — PROGRESS NOTES
General Surgery-[facility] Day: 10    ChiefComplaint on Admission: Multiorgan failure      Subjective:     Lewis Morillo is a 67 y.o. male with multiorgan failure. Pt  was extubated earlier today. Tolerating Diet NPO  ADULT TUBE FEEDING; Nasogastric; Peptide Based High Protein; Continuous; 20; Yes; 10; Q 4 hours; 65; 30; Q 4 hours. - BM.     ROS:  Review of Systems   Unable to perform ROS: Other     Allergies  Pcn [penicillins] and Fentanyl          Diagnosis Date    Acid reflux     Acute MI ([address] 2004, 2008    Arthritis     Back    Broken teeth     Upper Front    CAD (coronary artery disease)     Sees Dr. Kelsey Sosa [facility])     per old chart    Cerebral artery occlusion with cerebral infarction ([address]     CHF (congestive heart failure) ([address]     preserved ejection fraction    Chronic back pain     Chronic kidney disease     Stage IV - patient of Dr Ladd Sensing    Diabetes mellitus [facility]) Dx 1965    per old chart pt has been diabetic since age 13    Diabetic neuropathy ([address]     \"in my feet\"    H/O cardiovascular stress test 08/25/2016    H/O Doppler ultrasound 09/27/2018    Moderate disease of the right lower extremity with an JALEN of 0.72. Moderate to severe disease of the left lower extremity with an JALEN of 0.55.     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    Neuropathy     both feet    PAD (peripheral artery disease) ([address] 09/27/2018    PVD (peripheral vascular disease) ([address]     Sick sinus syndrome (HCC)     Sleep apnea     \"sleep study 3 yrs ago- could not tolerate the cpap made me too dry\"    Spinal stenosis     Teeth missing     Upper And Lower    Type 2 diabetes mellitus without complication ([facility]     WD-Chronic foot ulcer, left, with necrosis of bone ([facility] 2021       Objective:     Vitals:    23 1123   BP:    Pulse: 68   Resp: 15   Temp:    SpO2: 100%       TEMPERATURE:  Current - Temp: 98.4 °F (36.9 °C); Max - Temp  Av.5 °F (36.9 °C)  Min: 98.4 °F (36.9 °C)  Max: 98.6 °F (37 °C)    I/O this shift:  In: 30 [NG/GT:30]  Out: 450 [Urine:450]I/O last 3 completed shifts: In: 4211.1 [I.V.:598.4; NG/GT:2493; IV Piggyback:1119.8]  Out: 9669 [Urine:3855; Chest Tube:210]      Physical Exam:    Physical Exam  Constitutional:       Interventions: Nasal cannula in place.    Musculoskeletal:        Feet:            Scheduled Meds:   cefepime  2,000 mg IntraVENous Q12H    silver sulfADIAZINE   Topical Daily    insulin lispro  0-8 Units SubCUTAneous Q4H    sodium chloride (Inhalant)  4 mL Nebulization Q4H    furosemide  80 mg IntraVENous TID    chlorothiazide (DIURIL) IVPB  500 mg IntraVENous Q12H    linezolid  600 mg IntraVENous Q12H    famotidine  20 mg Per NG tube Daily    heparin (porcine)  5,000 Units SubCUTAneous 3 times per day    sodium hypochlorite   Irrigation Daily    collagenase   Topical Daily    sodium chloride flush  5-40 mL IntraVENous 2 times per day    aspirin  81 mg Oral Daily    [Held by provider] clopidogrel  75 mg Oral Daily    ipratropium-albuterol  1 ampule Inhalation Q4H WA    chlorhexidine  15 mL Mouth/Throat BID     Continuous Infusions:   dexmedetomidine HCl in NaCl 0.8 mcg/kg/hr (23 0926)    dextrose      sodium chloride 100 mL/hr at 23 1900     PRN Meds:midazolam, albuterol, heparin flush, fentanNYL, glucose, dextrose bolus **OR** dextrose bolus, glucagon (rDNA), dextrose, sodium chloride flush, sodium chloride      Labs/Imaging Results:   Lab Results   Component Value Date    WBC 8.3 2023    HGB 7.7 (L) 2023    HCT 25.3 (L) 2023    MCV 86.1 2023     02/19/2023     Lab Results   Component Value Date     (L) 02/20/2023    K 4.1 02/20/2023    CL 95 (L) 02/20/2023    CO2 29 02/20/2023    BUN 54 (H) 02/20/2023    CREATININE 2.9 (H) 02/20/2023    GLUCOSE 270 (H) 02/20/2023    CALCIUM 8.1 (L) 02/20/2023    PROT 6.2 (L) 02/20/2023    LABALBU 2.5 (L) 02/20/2023    BILITOT 0.5 02/20/2023    ALKPHOS 81 02/20/2023    AST 14 (L) 02/20/2023    ALT 8 (L) 02/20/2023    LABGLOM 22 (L) 02/20/2023    GFRAA 25 (L) 10/17/2022       Assessment:     Patient Active Problem List:     PAD (peripheral artery disease) ([facility])     Chronic coronary artery disease     Biventricular ICD (implantable cardioverter-defibrillator) in place     Chronic combined systolic and diastolic heart failure ([facility])     Chronic kidney disease, stage III (moderate) ([facility])     Mixed hyperlipidemia     Sick sinus syndrome ([facility])     Type 2 diabetes mellitus with diabetic polyneuropathy ([facility]     Spinal stenosis of lumbar region     Obesity, Class I, BMI 30-34.9     S/P partial colectomy     Tubulovillous adenoma of colon     Microalbuminuria     WD-PVD (peripheral vascular disease) ([facility])     Limb ischemia     Necrotic toes ([facility])     Toe gangrene ([facility])     Diabetic foot infection ([facility]     Chronic kidney disease (CKD) stage G3a/A2, moderately decreased glomerular filtration rate (GFR) between 45-59 mL/min/1.73 square meter and albuminuria creatinine ratio between  mg/g ([facility])     Edema     ICD (implantable cardioverter-defibrillator) battery depletion     Hyperkalemia     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[facility])     Ischemia of toe     Acute kidney injury ([facility]     Fluid overload     DM (diabetes mellitus) ([facility])     Precordial pain     Acute chest pain     Unstable angina ([facility])     Chronic kidney disease (CKD) stage G3a/A3, moderately decreased glomerular filtration rate (GFR) between 45-59 mL/min/1.73 square meter and albuminuria creatinine ratio greater than 300 mg/g ([facility])     Cardiomyopathy ([facility])     Diabetic neuropathy ([address]     HTN (hypertension)     Epigastric pain     Acute on chronic congestive heart failure (HCC)     Leg edema     Acute on chronic systolic CHF (congestive heart failure) ([address]     Diabetic foot ulcer with osteomyelitis ([address]     Visit for wound check     Moderate malnutrition ([address]     Long term (current) use of antibiotics     WD-Diabetic ulcer of toe of right foot associated with type 2 diabetes mellitus, with fat layer exposed ([address]     Diabetic ulcer of toe associated with type 2 diabetes mellitus, with bone involvement without evidence of necrosis ([address]     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     Persistent wound pain     Receiving intravenous antibiotic treatment as outpatient     Acute on chronic respiratory failure with hypoxemia ([address]     WD-Chronic foot ulcer, left, with necrosis of bone ([facility])     Acute on chronic HFrEF (heart failure with reduced ejection fraction) ([facility])     Scrotal edema     Hypertensive urgency     Diabetic ulcer of right foot due to type 2 diabetes mellitus ([address]     Cellulitis of foot     Toe osteomyelitis ([facility])     Heart failure exacerbated by sotalol ([facility])     CHF (congestive heart failure), NYHA class I, acute on chronic, combined (HCC)     Acute on chronic diastolic CHF (congestive heart failure) (HCC)     Leg swelling     Acute on chronic diastolic heart failure (HCC)     Skin ulcer of left foot including toes with fat layer exposed ([address]     Superficial incisional surgical site infection     Bacteremia due to Enterococcus     Acute respiratory failure with hypoxia and hypercapnia (HCC)     Acute kidney injury superimposed on CKD ([address]     Diabetic foot ([address]     Diabetic ulcer of midfoot associated with diabetes mellitus due to underlying condition, with muscle involvement without evidence of necrosis ([address]     Other seizures ([address]      Plan:      Wounds have improved. Continue local wound care to bilateral feet with dakins and santyl.   Will start silvadene to bilateral low leg wounds - Change R foot dressing BID      Elvis Bautista PA-C

## 2023-02-20 NOTE — PLAN OF CARE
Problem: Discharge Planning  Goal: Discharge to home or other facility with appropriate resources  2/19/2023 2013 by Felix Olmos RN  Outcome: Progressing  2/19/2023 1031 by Vinny Camarillo RN  Outcome: Progressing     Problem: Pain  Goal: Verbalizes/displays adequate comfort level or baseline comfort level  2/19/2023 2013 by Felix Olmos RN  Outcome: Progressing  2/19/2023 1031 by Vinny Camarillo RN  Outcome: Progressing     Problem: Confusion  Goal: Confusion, delirium, dementia, or psychosis is improved or at baseline  Description: INTERVENTIONS:  1. Assess for possible contributors to thought disturbance, including medications, impaired vision or hearing, underlying metabolic abnormalities, dehydration, psychiatric diagnoses, and notify attending LIP  2. [place] high risk fall precautions, as indicated  3. Provide frequent short contacts to provide reality reorientation, refocusing and direction  4. Decrease environmental stimuli, including noise as appropriate  5. Monitor and intervene to maintain adequate nutrition, hydration, elimination, sleep and activity  6. If unable to ensure safety without constant attention obtain sitter and review sitter guidelines with assigned personnel  7.  Initiate Psychosocial CNS and Spiritual Care consult, as indicated  2/19/2023 2013 by Felix Olmos RN  Outcome: Progressing  2/19/2023 1031 by Vinny Camarillo RN  Outcome: Progressing     Problem: Chronic Conditions and Co-morbidities  Goal: Patient's chronic conditions and co-morbidity symptoms are monitored and maintained or improved  2/19/2023 2013 by Felix Olmos RN  Outcome: Progressing  2/19/2023 1031 by Vinny Camarillo RN  Outcome: Progressing     Problem: Nutrition Deficit:  Goal: Optimize nutritional status  2/19/2023 2013 by Felix Olmos RN  Outcome: Progressing  2/19/2023 1031 by Vinny Camarillo RN  Outcome: Progressing     Problem: Skin/Tissue Integrity  Goal: Absence of new skin breakdown  Description: 1. Monitor for areas of redness and/or skin breakdown  2. Assess vascular access sites hourly  3. Every 4-6 hours minimum:  Change oxygen saturation probe site  4. Every 4-6 hours:  If on nasal continuous positive airway pressure, respiratory therapy assess nares and determine need for appliance change or resting period.   2/19/2023 2013 by Dayana Kennedy RN  Outcome: Progressing  2/19/2023 1031 by Len Gomez RN  Outcome: Progressing     Problem: Safety - Adult  Goal: Free from fall injury  2/19/2023 2013 by Dayana Kennedy RN  Outcome: Progressing  2/19/2023 1031 by Len Gomez RN  Outcome: Progressing     Problem: ABCDS Injury Assessment  Goal: Absence of physical injury  2/19/2023 2013 by Dayana Kennedy RN  Outcome: Progressing  2/19/2023 1031 by Len Gomez RN  Outcome: Progressing

## 2023-02-20 NOTE — PLAN OF CARE

## 2023-02-20 NOTE — PROGRESS NOTES
V2.0  [facility] Hospitalist Progress Note      Name:  Preston Galdamez /Age/Sex: 1950  (67 y.o. male)   MRN & CSN:  [id number] & [id number] Encounter Date/Time: 2023 6:51 PM EST    Location:  -A PCP: Kameron Isaac Day: 10    Assessment and Plan:   Preston Galdamez is a 67 y.o. male with pmh of  CAD, HFpEF, ICD, CKD, DM who presents with Acute respiratory failure with hypoxia and hypercapnia ([facility] [address]    Plan:  Acute hypoxic Hypercapneic respiratory failure: was initially placed on BIPAP but later was intubated on MV was started on Empiric broad spectrum Abx --> continue clinda. CXR (): worsening R base opacity and small L pleural effusion  Cardiac arrest due to hypoxia: underwent CPR after pulse became thready after patient was reintubated when he was not being ventilated. Chest tube was also placed. Now on precidex   Acute renal failure with h/o CKD stage 3: was initially started on HD but could not tolerate. CRRT stopped, nephro wants to try \"furosemide stress test\"--> 1300 output (net positive 1400), not more than previous day. Follow up on nephro recs  Hyperkalemia-resolved: in the setting of above, improving  Elevated troponin: likely type 2 MI in the setting of respiratory as well as kidney failure, does have h/o CAD with multiple stents, Cardio on board. HFpEF:  EF 50-55% in . Repeat echo with simillar EF, hypokinetic RV with bowing of Interventricular septum towards LV. Chronic LE wounds: Wound care on board. Likely infected, Purulent drainage from RLE 1st metatarsal which is foul smelling. General surgery on consult, appreciate recs. Wound cultures sent, follow up.  Wound cultures growing Pseudomonas aeruginosa, Citrobacter freundii MRSA, Enterococcus faecalis, Bacteroides thetaiotaomicron (with beta lactamase) (DOC metronidazole, cefoxitin with Zosyn)    Diet Diet NPO  ADULT TUBE FEEDING; Nasogastric; Peptide Based High Protein; Continuous; 20; Yes; 10; Q 4 hours; 72; 30; Q 4 hours   DVT Prophylaxis [] Lovenox, [x]  Heparin, [] SCDs, [] Ambulation,  [] Eliquis, [] Xarelto  [] Coumadin   Code Status Full Code   Disposition From: Home  Expected Disposition: TBD  Estimated Date of Discharge: TBD  Patient requires continued admission due to Respiratory and renal failure   Surrogate Decision Maker/ POA      Subjective:     Chief Complaint: Respiratory Distress     Patient intubated on precidex, did not wake up for me      Review of Systems:    Review of Systems  Could not be obtained, patient intubated    Objective: Intake/Output Summary (Last 24 hours) at 2/20/2023 0813  Last data filed at 2/20/2023 0700  Gross per 24 hour   Intake 2849.18 ml   Output 2905 ml   Net -55.82 ml          Vitals:   Vitals:    02/20/23 0802   BP: (!) 151/72   Pulse: 60   Resp: (!) 7   Temp:    SpO2: 98%       Physical Exam:   Physical Exam General: Ill-appearing male, NAD, intubated  Eyes: EOMI  ENT: neck supple, ETT  Respiratory:  mechanical ventilation, clear to auscultation  Gastrointestinal: Obese, soft, non tender  Genitourinary: no suprapubic tenderness, Fuller catheter  Musculoskeletal: wounds covered in bandages   Skin: warm, dry  Neuro: Unresponsive on ventilator.       Medications:   Medications:    insulin lispro  0-8 Units SubCUTAneous Q4H    sodium chloride (Inhalant)  4 mL Nebulization Q4H    furosemide  80 mg IntraVENous TID    chlorothiazide (DIURIL) IVPB  500 mg IntraVENous Q12H    cefepime  1,000 mg IntraVENous Q12H    linezolid  600 mg IntraVENous Q12H    famotidine  20 mg Per NG tube Daily    heparin (porcine)  5,000 Units SubCUTAneous 3 times per day    sodium hypochlorite   Irrigation Daily    collagenase   Topical Daily    sodium chloride flush  5-40 mL IntraVENous 2 times per day    aspirin  81 mg Oral Daily    [Held by provider] clopidogrel  75 mg Oral Daily    ipratropium-albuterol  1 ampule Inhalation Q4H WA    chlorhexidine  15 mL Mouth/Throat BID      Infusions: dexmedetomidine HCl in NaCl 0.8 mcg/kg/hr (02/20/23 0526)    dextrose      sodium chloride 100 mL/hr at 02/18/23 1900    propofol Stopped (02/18/23 1122)     PRN Meds: albuterol, 2.5 mg, Q2H PRN  heparin flush, 240 Units, PRN  fentanNYL, 50 mcg, Q1H PRN  midazolam, 1 mg, Q2H PRN  glucose, 4 tablet, PRN  dextrose bolus, 125 mL, PRN   Or  dextrose bolus, 250 mL, PRN  glucagon (rDNA), 1 mg, PRN  dextrose, , Continuous PRN  sodium chloride flush, 10 mL, PRN  sodium chloride, , PRN      Labs      Recent Results (from the past 24 hour(s))   POCT Glucose    Collection Time: 02/19/23  2:17 PM   Result Value Ref Range    POC Glucose 288 (H) 70 - 99 MG/DL   POCT Glucose    Collection Time: 02/19/23  7:50 PM   Result Value Ref Range    POC Glucose 301 (H) 70 - 99 MG/DL   POCT Glucose    Collection Time: 02/19/23 11:43 PM   Result Value Ref Range    POC Glucose 343 (H) 70 - 99 MG/DL   POCT Glucose    Collection Time: 02/20/23  3:28 AM   Result Value Ref Range    POC Glucose 291 (H) 70 - 99 MG/DL   Comprehensive Metabolic Panel    Collection Time: 02/20/23  5:30 AM   Result Value Ref Range    Sodium 131 (L) 135 - 145 MMOL/L    Potassium 4.1 3.5 - 5.1 MMOL/L    Chloride 95 (L) 99 - 110 mMol/L    CO2 29 21 - 32 MMOL/L    BUN 54 (H) 6 - 23 MG/DL    Creatinine 2.9 (H) 0.9 - 1.3 MG/DL    Est, Glom Filt Rate 22 (L) >60 mL/min/1.73m2    Glucose 270 (H) 70 - 99 MG/DL    Calcium 8.1 (L) 8.3 - 10.6 MG/DL    Albumin 2.5 (L) 3.4 - 5.0 GM/DL    Total Protein 6.2 (L) 6.4 - 8.2 GM/DL    Total Bilirubin 0.5 0.0 - 1.0 MG/DL    ALT 8 (L) 10 - 40 U/L    AST 14 (L) 15 - 37 IU/L    Alkaline Phosphatase 81 40 - 129 IU/L    Anion Gap 7 4 - 16        Imaging/Diagnostics Last 24 Hours   XR CHEST PORTABLE    Result Date: 2/11/2023  EXAMINATION: ONE XRAY VIEW OF THE CHEST 2/11/2023 7:29 pm COMPARISON: Chest radiograph 02/11/2023 HISTORY: ORDERING SYSTEM PROVIDED HISTORY: ETT placement TECHNOLOGIST PROVIDED HISTORY: Reason for exam:->ETT placement Reason for Exam: et and og tube placement confirmation Additional signs and symptoms: et and og tube placement confrimation FINDINGS: Lines and tubes: -ETT terminates at the superior margin of the clavicles. -enteric tube takes a the subdiaphragmatic course and terminates out of the field of view -right-sided Cordis catheter, which terminates within the mid/upper SVC Lungs: There is indistinctness of the pulmonary vasculature with patchy opacities within the right greater than left lungs. . Pleura: Small right-sided pleural effusion. Cardiomediastinal silhouette: Enlarged cardiac silhouette. Bones: No acute osseous findings. Soft tissues: Left-sided 2 lead cardiac device. Lines and tubes as above. The ETT terminates at the level of the superior margin of the clavicles. Grossly unchanged pulmonary exam.  Findings favor cardiogenic pulmonary edema. However a superimposed infectious process cannot be excluded.        Electronically signed by Debora Bryant MD on 2/20/2023 at 8:13 AM

## 2023-02-21 LAB
ALBUMIN SERPL-MCNC: 2.5 GM/DL (ref 3.4–5)
ALP BLD-CCNC: 77 IU/L (ref 40–128)
ALT SERPL-CCNC: 9 U/L (ref 10–40)
ANION GAP SERPL CALCULATED.3IONS-SCNC: 10 MMOL/L (ref 4–16)
AST SERPL-CCNC: 17 IU/L (ref 15–37)
BILIRUB SERPL-MCNC: 0.7 MG/DL (ref 0–1)
BUN SERPL-MCNC: 60 MG/DL (ref 6–23)
CALCIUM SERPL-MCNC: 8.1 MG/DL (ref 8.3–10.6)
CHLORIDE BLD-SCNC: 97 MMOL/L (ref 99–110)
CO2: 28 MMOL/L (ref 21–32)
CREAT SERPL-MCNC: 2.9 MG/DL (ref 0.9–1.3)
CULTURE: NORMAL
GFR SERPL CREATININE-BSD FRML MDRD: 22 ML/MIN/1.73M2
GLUCOSE BLD-MCNC: 152 MG/DL (ref 70–99)
GLUCOSE BLD-MCNC: 177 MG/DL (ref 70–99)
GLUCOSE BLD-MCNC: 182 MG/DL (ref 70–99)
GLUCOSE BLD-MCNC: 202 MG/DL (ref 70–99)
GLUCOSE SERPL-MCNC: 147 MG/DL (ref 70–99)
GRAM SMEAR: NORMAL
Lab: NORMAL
MAGNESIUM: 1.9 MG/DL (ref 1.8–2.4)
PHOSPHORUS: 3 MG/DL (ref 2.5–4.9)
POTASSIUM SERPL-SCNC: 4.1 MMOL/L (ref 3.5–5.1)
SODIUM BLD-SCNC: 135 MMOL/L (ref 135–145)
SPECIMEN: NORMAL
TOTAL PROTEIN: 5.9 GM/DL (ref 6.4–8.2)

## 2023-02-21 PROCEDURE — 6360000002 HC RX W HCPCS: Performed by: NURSE PRACTITIONER

## 2023-02-21 PROCEDURE — 83735 ASSAY OF MAGNESIUM: CPT

## 2023-02-21 PROCEDURE — 82962 GLUCOSE BLOOD TEST: CPT

## 2023-02-21 PROCEDURE — 2580000003 HC RX 258: Performed by: STUDENT IN AN ORGANIZED HEALTH CARE EDUCATION/TRAINING PROGRAM

## 2023-02-21 PROCEDURE — 6360000002 HC RX W HCPCS: Performed by: STUDENT IN AN ORGANIZED HEALTH CARE EDUCATION/TRAINING PROGRAM

## 2023-02-21 PROCEDURE — 84100 ASSAY OF PHOSPHORUS: CPT

## 2023-02-21 PROCEDURE — 6370000000 HC RX 637 (ALT 250 FOR IP): Performed by: PHYSICIAN ASSISTANT

## 2023-02-21 PROCEDURE — 80053 COMPREHEN METABOLIC PANEL: CPT

## 2023-02-21 PROCEDURE — 94761 N-INVAS EAR/PLS OXIMETRY MLT: CPT

## 2023-02-21 PROCEDURE — 2500000003 HC RX 250 WO HCPCS: Performed by: PHYSICIAN ASSISTANT

## 2023-02-21 PROCEDURE — 6370000000 HC RX 637 (ALT 250 FOR IP): Performed by: NURSE PRACTITIONER

## 2023-02-21 PROCEDURE — 36592 COLLECT BLOOD FROM PICC: CPT

## 2023-02-21 PROCEDURE — 2060000000 HC ICU INTERMEDIATE R&B

## 2023-02-21 PROCEDURE — 97530 THERAPEUTIC ACTIVITIES: CPT

## 2023-02-21 PROCEDURE — 6360000002 HC RX W HCPCS: Performed by: INTERNAL MEDICINE

## 2023-02-21 PROCEDURE — 97166 OT EVAL MOD COMPLEX 45 MIN: CPT

## 2023-02-21 PROCEDURE — 2580000003 HC RX 258: Performed by: INTERNAL MEDICINE

## 2023-02-21 PROCEDURE — 6360000002 HC RX W HCPCS: Performed by: SPECIALIST

## 2023-02-21 PROCEDURE — 94640 AIRWAY INHALATION TREATMENT: CPT

## 2023-02-21 PROCEDURE — 2700000000 HC OXYGEN THERAPY PER DAY

## 2023-02-21 PROCEDURE — 2580000003 HC RX 258: Performed by: NURSE PRACTITIONER

## 2023-02-21 PROCEDURE — 92610 EVALUATE SWALLOWING FUNCTION: CPT

## 2023-02-21 RX ORDER — MORPHINE SULFATE 4 MG/ML
4 INJECTION, SOLUTION INTRAMUSCULAR; INTRAVENOUS ONCE
Status: COMPLETED | OUTPATIENT
Start: 2023-02-21 | End: 2023-02-21

## 2023-02-21 RX ADMIN — Medication 4 ML: at 23:48

## 2023-02-21 RX ADMIN — SODIUM CHLORIDE, PRESERVATIVE FREE 10 ML: 5 INJECTION INTRAVENOUS at 21:25

## 2023-02-21 RX ADMIN — IPRATROPIUM BROMIDE AND ALBUTEROL SULFATE 1 AMPULE: 2.5; .5 SOLUTION RESPIRATORY (INHALATION) at 15:22

## 2023-02-21 RX ADMIN — FUROSEMIDE 80 MG: 10 INJECTION, SOLUTION INTRAMUSCULAR; INTRAVENOUS at 08:58

## 2023-02-21 RX ADMIN — IPRATROPIUM BROMIDE AND ALBUTEROL SULFATE 1 AMPULE: 2.5; .5 SOLUTION RESPIRATORY (INHALATION) at 07:21

## 2023-02-21 RX ADMIN — ASPIRIN 81 MG CHEWABLE TABLET 81 MG: 81 TABLET CHEWABLE at 13:11

## 2023-02-21 RX ADMIN — HEPARIN SODIUM 5000 UNITS: 5000 INJECTION INTRAVENOUS; SUBCUTANEOUS at 13:11

## 2023-02-21 RX ADMIN — FUROSEMIDE 80 MG: 10 INJECTION, SOLUTION INTRAMUSCULAR; INTRAVENOUS at 21:25

## 2023-02-21 RX ADMIN — Medication 4 ML: at 20:02

## 2023-02-21 RX ADMIN — IPRATROPIUM BROMIDE AND ALBUTEROL SULFATE 1 AMPULE: 2.5; .5 SOLUTION RESPIRATORY (INHALATION) at 11:35

## 2023-02-21 RX ADMIN — COLLAGENASE SANTYL: 250 OINTMENT TOPICAL at 13:11

## 2023-02-21 RX ADMIN — MORPHINE SULFATE 4 MG: 4 INJECTION, SOLUTION INTRAMUSCULAR; INTRAVENOUS at 06:41

## 2023-02-21 RX ADMIN — SILVER SULFADIAZINE: 10 CREAM TOPICAL at 13:12

## 2023-02-21 RX ADMIN — Medication 4 ML: at 04:20

## 2023-02-21 RX ADMIN — HEPARIN SODIUM 5000 UNITS: 5000 INJECTION INTRAVENOUS; SUBCUTANEOUS at 21:25

## 2023-02-21 RX ADMIN — SODIUM HYPOCHLORITE: 1.25 SOLUTION TOPICAL at 13:13

## 2023-02-21 RX ADMIN — Medication 4 ML: at 15:22

## 2023-02-21 RX ADMIN — CHLOROTHIAZIDE SODIUM 500 MG: 500 INJECTION, POWDER, LYOPHILIZED, FOR SOLUTION INTRAVENOUS at 05:33

## 2023-02-21 RX ADMIN — LINEZOLID 600 MG: 600 INJECTION, SOLUTION INTRAVENOUS at 11:16

## 2023-02-21 RX ADMIN — CEFEPIME HYDROCHLORIDE 2000 MG: 2 INJECTION, POWDER, FOR SOLUTION INTRAVENOUS at 13:03

## 2023-02-21 RX ADMIN — CEFEPIME HYDROCHLORIDE 2000 MG: 2 INJECTION, POWDER, FOR SOLUTION INTRAVENOUS at 23:46

## 2023-02-21 RX ADMIN — INSULIN GLARGINE 5 UNITS: 100 INJECTION, SOLUTION SUBCUTANEOUS at 21:25

## 2023-02-21 RX ADMIN — LINEZOLID 600 MG: 600 INJECTION, SOLUTION INTRAVENOUS at 23:45

## 2023-02-21 RX ADMIN — FUROSEMIDE 80 MG: 10 INJECTION, SOLUTION INTRAMUSCULAR; INTRAVENOUS at 13:10

## 2023-02-21 RX ADMIN — SODIUM CHLORIDE, PRESERVATIVE FREE 10 ML: 5 INJECTION INTRAVENOUS at 08:58

## 2023-02-21 RX ADMIN — HYDROMORPHONE HYDROCHLORIDE 1 MG: 1 INJECTION, SOLUTION INTRAMUSCULAR; INTRAVENOUS; SUBCUTANEOUS at 14:54

## 2023-02-21 RX ADMIN — HEPARIN SODIUM 5000 UNITS: 5000 INJECTION INTRAVENOUS; SUBCUTANEOUS at 05:28

## 2023-02-21 RX ADMIN — HYDROMORPHONE HYDROCHLORIDE 1 MG: 1 INJECTION, SOLUTION INTRAMUSCULAR; INTRAVENOUS; SUBCUTANEOUS at 23:39

## 2023-02-21 RX ADMIN — SODIUM CHLORIDE: 9 INJECTION, SOLUTION INTRAVENOUS at 06:13

## 2023-02-21 RX ADMIN — Medication 4 ML: at 07:21

## 2023-02-21 RX ADMIN — Medication 4 ML: at 11:35

## 2023-02-21 RX ADMIN — IPRATROPIUM BROMIDE AND ALBUTEROL SULFATE 1 AMPULE: 2.5; .5 SOLUTION RESPIRATORY (INHALATION) at 20:01

## 2023-02-21 ASSESSMENT — PAIN DESCRIPTION - LOCATION
LOCATION: FOOT
LOCATION: LEG

## 2023-02-21 ASSESSMENT — PAIN SCALES - GENERAL
PAINLEVEL_OUTOF10: 9
PAINLEVEL_OUTOF10: 7
PAINLEVEL_OUTOF10: 8
PAINLEVEL_OUTOF10: 3

## 2023-02-21 ASSESSMENT — ENCOUNTER SYMPTOMS
SHORTNESS OF BREATH: 0
DIARRHEA: 0
WHEEZING: 0
EYE REDNESS: 0
NAUSEA: 0
VOMITING: 0
CONSTIPATION: 0
SINUS PRESSURE: 0
ABDOMINAL PAIN: 0
EYE ITCHING: 0
BACK PAIN: 0
CHEST TIGHTNESS: 0
SINUS PAIN: 0
EYE PAIN: 0
COUGH: 0
SORE THROAT: 0

## 2023-02-21 ASSESSMENT — PAIN - FUNCTIONAL ASSESSMENT: PAIN_FUNCTIONAL_ASSESSMENT: PREVENTS OR INTERFERES SOME ACTIVE ACTIVITIES AND ADLS

## 2023-02-21 ASSESSMENT — PAIN DESCRIPTION - DESCRIPTORS
DESCRIPTORS: ACHING
DESCRIPTORS: SHARP

## 2023-02-21 ASSESSMENT — PAIN DESCRIPTION - ORIENTATION: ORIENTATION: RIGHT;LEFT

## 2023-02-21 NOTE — PROGRESS NOTES
Comprehensive Nutrition Assessment    Type and Reason for Visit:  Reassess    Nutrition Recommendations/Plan:   Continue current diet  Start diabetic supplement     Malnutrition Assessment:  Malnutrition Status:  No malnutrition (02/13/23 1225)    Context:  Acute Illness       Nutrition Assessment:    Pt has now been extubated and was put on a soft and bite size diet. He is consumin 26-50% of his meals. Will order supplements to help the pt meet his needs and continue to follow    Nutrition Related Findings:    . Wound Type: Multiple, Venous Stasis, Diabetic Ulcer       Current Nutrition Intake & Therapies:    Average Meal Intake: 26-50%  Average Supplements Intake: None Ordered  ADULT DIET; Dysphagia - Soft and Bite Sized; Mildly Thick (Nectar)    Anthropometric Measures:  Height: 6' (182.9 cm)  Ideal Body Weight (IBW): 178 lbs (81 kg)    Admission Body Weight: 299 lb 4.8 oz (135.8 kg)  Current Body Weight: 279 lb (126.6 kg), 158.3 % IBW.  Weight Source: Bed Scale  Current BMI (kg/m2): 37.8  Usual Body Weight: 261 lb 7 oz (118.6 kg) (11/13/22)  % Weight Change (Calculated): 14.2  Weight Adjustment For: Amputation, No Adjustment (toe amputation)                 BMI Categories: Obese Class 2 (BMI 35.0 -39.9)    Estimated Daily Nutrient Needs:  Energy Requirements Based On: Formula  Weight Used for Energy Requirements: Current  Energy (kcal/day): 2466  Weight Used for Protein Requirements: Ideal  Protein (g/day): 97  Method Used for Fluid Requirements: Standard Renal  Fluid (ml/day): 1200    Nutrition Diagnosis:   Inadequate protein-energy intake related to impaired nutrient utilization, increase demand for energy/nutrients as evidenced by intake 26-50%    Nutrition Interventions:   Food and/or Nutrient Delivery: Continue Current Diet, Start Oral Nutrition Supplement  Nutrition Education/Counseling: No recommendation at this time  Coordination of Nutrition Care: Continue to monitor while inpatient Goals:  Previous Goal Met: Progressing toward Goal(s)  Goals: Meet at least 75% of estimated needs       Nutrition Monitoring and Evaluation:   Behavioral-Environmental Outcomes: None Identified  Food/Nutrient Intake Outcomes: Enteral Nutrition Intake/Tolerance, IVF Intake  Physical Signs/Symptoms Outcomes: Biochemical Data, GI Status, Fluid Status or Edema, Hemodynamic Status, Skin, Weight    Discharge Planning:     Too soon to determine     Selina Winston RD, LD  Contact: [phone]

## 2023-02-21 NOTE — PROGRESS NOTES
[facility] ACUTE CARE OCCUPATIONAL THERAPY EVALUATION  Shikha Noriega, 1950, 2126/2126-A, 2/21/2023    History  [place]:  The primary encounter diagnosis was Acute respiratory failure with hypoxia and hypercapnia ([address] Diagnoses of Acute kidney injury superimposed on chronic kidney disease ([address] Hyperkalemia, Acute on chronic heart failure, unspecified heart failure type ([address] Elevated troponin, and Acidosis, metabolic, with respiratory acidosis were also pertinent to this visit. Patient  has a past medical history of Acid reflux, Acute MI ([address] Arthritis, Broken teeth, CAD (coronary artery disease), Cardiomyopathy ([address] Cerebral artery occlusion with cerebral infarction ([address] CHF (congestive heart failure) ([address] Chronic back pain, Chronic kidney disease, Diabetes mellitus ([address] Diabetic neuropathy ([address] H/O cardiovascular stress test, H/O Doppler ultrasound, H/O percutaneous left heart catheterization, History of irregular heartbeat, History of syncope, Hyperlipidemia, Hypertension, Leg swelling, Necrotic toes (HCC), Neuropathy, PAD (peripheral artery disease) ([address] PVD (peripheral vascular disease) ([address] Sick sinus syndrome ([address] Sleep apnea, Spinal stenosis, Teeth missing, Type 2 diabetes mellitus without complication ([address] and WD-Chronic foot ulcer, left, with necrosis of bone ([address] 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 Subjective:  Patient states:  \"It's not comfortable\". Pain:  6/10 pain in bilateral legs  Communication with other providers:  Handoff to RN  Restrictions: Contact Precautions, General Precautions, Fall Risk    Home Setup/Prior level of function  Social/Functional History  Lives With: Family, Daughter  Type of Home: House  Home Layout: Two level  Home Access: Ramped entrance  Bathroom Shower/Tub: Shower chair without back, Tub/Shower unit  Bathroom Equipment: Shower chair  Bathroom Accessibility: Not accessible  Home Equipment: Oxygen, Cane, Electric scooter, Walker, standard, Hospital bed  Receives Help From: Family  Pt needs assistance for LB ADLs, doesn't perform high level IADLs and was able to perform functional stand pivot transfers to wheelchair independently however in last few months needs assistance of daughter. Examination of body systems (includes body structures/functions, activity/participation limitations):  Observation:  Supine in bed upon arrival, agreeable to therapy   Vision:  Glasses  Hearing:  [employer] [place]  Cardiopulmonary: 2L of 02. 02 saturation remained WFL throughout session.  BP supine in bed: 129/59; sitting EOB: 116/82; supine in bed at end of session: 128/83      Body Systems and functions:  ROM R/L:  BUE ~20 degrees shoulder flexion, elbow flexion limited due to edema, limited in elbow flexion/extension due to edema  Strength R/L:  2/5,   Sensation: Numbness in bilateral fingers up to forearms, numbness in bilateral feet up to shins  Tone: BUE/BLE hypotonic due to generalized weakness  Coordination: WFL  Perception: Difficulty sitting midline due to generalized weakness    Activities of Daily Living (ADLs):  Feeding: Setup/SBA  Grooming: Teo (washing hands/face w/ warm washcloth)  UB bathing: Teo  LB bathing: maxA  UB dressing: Teo  LB dressing: maxA  Toileting: maxA    Cognitive and Psychosocial Functioning:  Overall cognitive status: AxO x 4, decreased problem solving, decreased safety awareness, decreased insight into deficits, follows one step commands w/ repetition  Affect: Fatigued       Mobility:  Supine to sit:  maxA  Transfers: DNT  Sitting balance:  maxA moving to Teo w/ proper hand placement and positioning ~7 minutes w/ Vcs and tactile cues to increase sitting balance and posture    Standing balance:  DNT. Functional Mobility DNT  Toilet/Shower Transfers: DNT  Sit to supine: MaxA x 2  Scooting to HOB: maxA x 2  Rolling L/R: maxA           AM-PAC Daily Activity - Inpatient   How much help is needed for putting on and taking off regular lower body clothing?: Total  How much help is needed for bathing (which includes washing, rinsing, drying)?: A Lot  How much help is needed for toileting (which includes using toilet, bedpan, or urinal)?: Total  How much help is needed for putting on and taking off regular upper body clothing?: A Little  How much help is needed for taking care of personal grooming?: A Little  How much help for eating meals?: A Little  AM-[facility] Inpatient Daily Activity Raw Score: 13  AM-PAC Inpatient ADL T-Scale Score : 32.03  ADL Inpatient CMS 0-100% Score: 63.03  ADL Inpatient CMS G-Code Modifier : CL    Treatment:  Therapeutic Activity Training:   Therapeutic activity training was instructed today. Cues were given for safety, sequence, UE/LE placement, awareness, and balance. Activities performed today included bed mobility training, sup-sit, dynamic sitting balance and tolerance, sit to supine, scooting to HOB, rolling L/R    Safety: patient left in bed with bed alarm, call light within reach, RN notified, gait belt used. Assessment:  Pt is a 66 yo male admitted from home for acute respiratory failure. Pt at baseline needs assistance for ADLs doesn't perform high level IADLs and needs assistance for functional transfers.  Pt was intubated 2/11, attempted extubation 2/16 and pt was subsequently re-intubated. Pt was extubated 2/20. Pt currently presents w/ deficits in ADL and high level IADL independence, functional activity tolerance, dynamic sitting and standing balance and tolerance and functional transfers, BUE strength/ROM, OOB activity. Pt would benefit from continued acute care OT services w/ discharge to SNF  Complexity: Moderate  Prognosis: Good, no significant barriers to participation at this time. Occupational Therapy Plan  Times Per Week: 3x+  Times Per Day:  Once a day  Current Treatment Recommendations: Strengthening, Balance training, ROM, Functional mobility training, Neuromuscular re-education, Cognitive reorientation, Pain management, Safety education & training, Patient/Caregiver education & training, Equipment evaluation, education, & procurement, Positioning, Self-Care / ADL, Home management training, Cognitive/Perceptual training, Coordination training     Equipment: defer    Goals:  Pt goal: go home  Time Frame for STGs: discharge  Goal 1: Pt will perform UE ADLs Supervision  Goal 2: Pt will perform LE ADLs modA w/ AD  Goal 3: Pt will perform toileting modA  Goal 4: Pt will perform functional transfer w/ AD CGA  Goal 5: Pt will perform all aspects of bed mobility SBA  Goal 6: Pt will perform therex/theract in order to increase functional activity tolerance and dynamic sitting balance    Treatment plan:  Pt will perform functional task in sit reaching in all 3 planes in order to increase dynamic sitting balance and functional activity tolerance    Recommendations for NURSING activity: RAMYA    Time:   Time in: 1032  Time out: 1105  Timed treatment minutes: 23 minutes  Total time: 33 minutes    Electronically signed by:    Jamil LAW/L [id number]  11:34 AM,2/21/2023

## 2023-02-21 NOTE — PROGRESS NOTES
V2.0  [facility] [place] Note      Name:  Patti Martínez /Age/Sex: 1950  (67 y.o. male)   MRN & CSN:  [id number] & [id number] Encounter Date/Time: 2023 6:51 PM EST    Location:  -A PCP: Carter Phelps Day: 11    Assessment and Plan:   Patti Martínez is a 67 y.o. male with pmh of  CAD, HFpEF, ICD, CKD, DM who presents with Acute respiratory failure with hypoxia and hypercapnia ([facility] [address]    Plan:  Acute hypoxic Hypercapneic respiratory failure: was initially placed on BIPAP but later was intubated on MV was started on Empiric broad spectrum. CXR (): worsening R base opacity and small L pleural effusion. On cefepime and linezolin. Now extubated (2023) and on 2L/min. Wean O2 as able. Cardiac arrest due to hypoxia: underwent CPR after pulse became thready after patient was reintubated when he was not being ventilated. Chest tube was also placed. Now off precidex   Acute renal failure with h/o CKD stage IV: was initially started on HD but could not tolerate. CRRT stopped. Follow up on nephro recs. Keep HD cath for now. Hyperkalemia-resolved: in the setting of above, improving  Elevated troponin: likely type 2 MI in the setting of respiratory as well as kidney failure, does have h/o CAD with multiple stents, Cardio on board. HFpEF:  EF 50-55% in . Repeat echo with simillar EF, hypokinetic RV with bowing of Interventricular septum towards LV. Chronic LE wounds: Wound care on board. Likely infected, Purulent drainage from RLE 1st metatarsal which is foul smelling. General surgery on consult, appreciate recs. Wound cultures sent - growing Pseudomonas aeruginosa, Citrobacter freundii MRSA, Enterococcus faecalis, Bacteroides thetaiotaomicron (with beta lactamase) (DOC metronidazole, cefoxitin with Zosyn). Defer to ICU team.   Class II Obesity. BMI 37.85    IM will continue to follow while patient is in the ICU. IM will take over once pt. Is transferred out. Diet ADULT DIET; Dysphagia - Soft and Bite Sized; Mildly Thick (Nectar)   DVT Prophylaxis [] Lovenox, [x]  Heparin, [] SCDs, [] Ambulation,  [] Eliquis, [] Xarelto  [] Coumadin   Code Status Full Code   Disposition From: Home  Expected Disposition: TBD  Estimated Date of Discharge: 2-3 days  Patient requires continued admission due to Respiratory and renal failure. Pending clearance from [facility] and nephrology. Surrogate Decision Maker/ POA      Subjective:     Chief Complaint: Respiratory Distress     Patient awake and alert. Oriented X 3 today. Family member at bedside. Pt. States he feels good except for B/L LE pain. Review of Systems:    Review of Systems   Constitutional:  Negative for appetite change, chills, fatigue, fever and unexpected weight change. HENT:  Negative for sinus pressure, sinus pain and sore throat. Eyes:  Negative for pain, redness and itching. Respiratory:  Negative for cough, chest tightness, shortness of breath and wheezing. Cardiovascular:  Negative for chest pain, palpitations and leg swelling. Gastrointestinal:  Negative for abdominal pain, constipation, diarrhea, nausea and vomiting. Endocrine: Negative for polydipsia, polyphagia and polyuria. Genitourinary:  Negative for decreased urine volume, difficulty urinating, dysuria, frequency, hematuria and urgency. Musculoskeletal:  Positive for myalgias. Negative for arthralgias and back pain. Skin:  Positive for wound. Negative for pallor and rash. Neurological:  Negative for dizziness, tremors, seizures, syncope, weakness, light-headedness, numbness and headaches. Psychiatric/Behavioral:  Negative for agitation and confusion. Objective:      Intake/Output Summary (Last 24 hours) at 2/21/2023 1535  Last data filed at 2/21/2023 1350  Gross per 24 hour   Intake 968.36 ml   Output 2170 ml   Net -1201.64 ml          Vitals:   Vitals:    02/21/23 1454   BP:    Pulse:    Resp: 14   Temp:    SpO2: Physical Exam:   Physical Exam  Vitals and nursing note reviewed. Constitutional:       General: He is not in acute distress. Appearance: He is obese. He is not ill-appearing, toxic-appearing or diaphoretic. Interventions: Nasal cannula in place. Comments: 2L/min   HENT:      Head: Normocephalic and atraumatic. Right Ear: External ear normal.      Left Ear: External ear normal.      Nose: Nose normal.      Mouth/Throat:      Mouth: Mucous membranes are moist.      Pharynx: Oropharynx is clear. Eyes:      General: No scleral icterus. Right eye: No discharge. Left eye: No discharge. Conjunctiva/sclera: Conjunctivae normal.      Pupils: Pupils are equal, round, and reactive to light. Cardiovascular:      Rate and Rhythm: Normal rate and regular rhythm. Pulses: Normal pulses. Heart sounds: Normal heart sounds. No murmur heard. No friction rub. No gallop. Pulmonary:      Effort: Pulmonary effort is normal. No respiratory distress. Breath sounds: Normal breath sounds. Decreased air movement and transmitted upper airway sounds present. No stridor. No decreased breath sounds, wheezing, rhonchi or rales. Abdominal:      General: Bowel sounds are normal. There is no distension. Palpations: Abdomen is soft. There is no mass. Tenderness: There is no abdominal tenderness. There is no guarding or rebound. Hernia: No hernia is present. Musculoskeletal:      Right lower leg: No edema. Left lower leg: No edema. Comments: B/L LE bandaged   Skin:     Capillary Refill: Capillary refill takes less than 2 seconds. Neurological:      Mental Status: He is alert and oriented to person, place, and time.          Medications:   Medications:    cefepime  2,000 mg IntraVENous Q12H    silver sulfADIAZINE   Topical Daily    insulin glargine  5 Units SubCUTAneous Nightly    sodium chloride (Inhalant)  4 mL Nebulization Q4H    furosemide  80 mg IntraVENous TID    linezolid  600 mg IntraVENous Q12H    famotidine  20 mg Per NG tube Daily    heparin (porcine)  5,000 Units SubCUTAneous 3 times per day    sodium hypochlorite   Irrigation Daily    collagenase   Topical Daily    sodium chloride flush  5-40 mL IntraVENous 2 times per day    aspirin  81 mg Oral Daily    [Held by provider] clopidogrel  75 mg Oral Daily    ipratropium-albuterol  1 ampule Inhalation Q4H WA      Infusions:    dextrose      sodium chloride 10 mL/hr at 02/21/23 0644     PRN Meds: HYDROmorphone, 1 mg, Q4H PRN  albuterol, 2.5 mg, Q2H PRN  heparin flush, 240 Units, PRN  glucose, 4 tablet, PRN  dextrose bolus, 125 mL, PRN   Or  dextrose bolus, 250 mL, PRN  glucagon (rDNA), 1 mg, PRN  dextrose, , Continuous PRN  sodium chloride flush, 10 mL, PRN  sodium chloride, , PRN      Labs      Recent Results (from the past 24 hour(s))   POCT Glucose    Collection Time: 02/20/23  4:44 PM   Result Value Ref Range    POC Glucose 176 (H) 70 - 99 MG/DL   POCT Glucose    Collection Time: 02/20/23  7:59 PM   Result Value Ref Range    POC Glucose 163 (H) 70 - 99 MG/DL   POCT Glucose    Collection Time: 02/21/23 12:15 AM   Result Value Ref Range    POC Glucose 202 (H) 70 - 99 MG/DL   Comprehensive Metabolic Panel    Collection Time: 02/21/23  4:00 AM   Result Value Ref Range    Sodium 135 135 - 145 MMOL/L    Potassium 4.1 3.5 - 5.1 MMOL/L    Chloride 97 (L) 99 - 110 mMol/L    CO2 28 21 - 32 MMOL/L    BUN 60 (H) 6 - 23 MG/DL    Creatinine 2.9 (H) 0.9 - 1.3 MG/DL    Est, Glom Filt Rate 22 (L) >60 mL/min/1.73m2    Glucose 147 (H) 70 - 99 MG/DL    Calcium 8.1 (L) 8.3 - 10.6 MG/DL    Albumin 2.5 (L) 3.4 - 5.0 GM/DL    Total Protein 5.9 (L) 6.4 - 8.2 GM/DL    Total Bilirubin 0.7 0.0 - 1.0 MG/DL    ALT 9 (L) 10 - 40 U/L    AST 17 15 - 37 IU/L    Alkaline Phosphatase 77 40 - 128 IU/L    Anion Gap 10 4 - 16   Phosphorus    Collection Time: 02/21/23  4:00 AM   Result Value Ref Range    Phosphorus 3.0 2.5 - 4.9 MG/DL Magnesium    Collection Time: 02/21/23  4:00 AM   Result Value Ref Range    Magnesium 1.9 1.8 - 2.4 mg/dl   POCT Glucose    Collection Time: 02/21/23  8:36 AM   Result Value Ref Range    POC Glucose 152 (H) 70 - 99 MG/DL        Imaging/Diagnostics Last 24 Hours   XR CHEST PORTABLE    Result Date: 2/11/2023  EXAMINATION: ONE XRAY VIEW OF THE CHEST 2/11/2023 7:29 pm COMPARISON: Chest radiograph 02/11/2023 HISTORY: ORDERING SYSTEM PROVIDED HISTORY: ETT placement TECHNOLOGIST PROVIDED HISTORY: Reason for exam:->ETT placement Reason for Exam: et and og tube placement confirmation Additional signs and symptoms: et and og tube placement confrimation FINDINGS: Lines and tubes: -ETT terminates at the superior margin of the clavicles. -enteric tube takes a the subdiaphragmatic course and terminates out of the field of view -right-sided Cordis catheter, which terminates within the mid/upper SVC Lungs: There is indistinctness of the pulmonary vasculature with patchy opacities within the right greater than left lungs. . Pleura: Small right-sided pleural effusion. Cardiomediastinal silhouette: Enlarged cardiac silhouette. Bones: No acute osseous findings. Soft tissues: Left-sided 2 lead cardiac device. Lines and tubes as above. The ETT terminates at the level of the superior margin of the clavicles. Grossly unchanged pulmonary exam.  Findings favor cardiogenic pulmonary edema. However a superimposed infectious process cannot be excluded.        Electronically signed by Maria C Barreto MD on 2/21/2023 at 3:35 PM

## 2023-02-21 NOTE — PROGRESS NOTES
Physical Therapy  Attempt Note    On arrival, pt is receiving aerosolized breathing tx at 1530 ; time limiting reattempt on this date, will attempt PT eval tomorrow as pt schedule allows.     Nilam Moss PT, DPT

## 2023-02-21 NOTE — PLAN OF CARE
Problem: Discharge Planning  Goal: Discharge to home or other facility with appropriate resources  2/21/2023 1802 by Radha Simpson RN  Outcome: Progressing  2/21/2023 1802 by Radha Simpson RN  Outcome: Progressing     Problem: Pain  Goal: Verbalizes/displays adequate comfort level or baseline comfort level  2/21/2023 1802 by Radha Simpson RN  Outcome: Progressing  2/21/2023 1802 by Radha Simpson RN  Outcome: Progressing     Problem: Chronic Conditions and Co-morbidities  Goal: Patient's chronic conditions and co-morbidity symptoms are monitored and maintained or improved  2/21/2023 1802 by Radha Simpson RN  Outcome: Progressing  2/21/2023 1802 by Radha Simpson RN  Outcome: Progressing     Problem: Nutrition Deficit:  Goal: Optimize nutritional status  2/21/2023 1802 by Radha Simpson RN  Outcome: Progressing  2/21/2023 1802 by Radha Simpson RN  Outcome: Progressing     Problem: Skin/Tissue Integrity  Goal: Absence of new skin breakdown  Description: 1. Monitor for areas of redness and/or skin breakdown  2. Assess vascular access sites hourly  3. Every 4-6 hours minimum:  Change oxygen saturation probe site  4. Every 4-6 hours:  If on nasal continuous positive airway pressure, respiratory therapy assess nares and determine need for appliance change or resting period.   2/21/2023 1802 by Radha Simpson RN  Outcome: Progressing  2/21/2023 1802 by Radha Simpson RN  Outcome: Progressing     Problem: Safety - Adult  Goal: Free from fall injury  2/21/2023 1802 by Radha Simpson RN  Outcome: Progressing  2/21/2023 1802 by Radha Simpson RN  Outcome: Progressing     Problem: ABCDS Injury Assessment  Goal: Absence of physical injury  2/21/2023 1802 by Radha Simpson RN  Outcome: Progressing  2/21/2023 1802 by Radha Simpson RN  Outcome: Progressing

## 2023-02-21 NOTE — PROGRESS NOTES
Nephrology Progress Note  2/21/2023 10:44 AM        Subjective:   Admit Date: 2/11/2023  PCP: Moriah Vazquez    Interval History:  patient seen early morning, this is a late entry- he was extubated  yesterday successfully- alert awake at least partially oriented    Diet:  he will need swallow evaluation first before eating orally    ROS:   he was bit emotional this morning which is not uncommon for him- of course he was on mechanical ventilation for several days and on continuous dialysis- all might have affected his central nervous system- but he knew me and my name instantly- we had some interaction and good conversation   urine output still 3.24 L for the last 24 hours albeit with  diuretic   no fever    Data:     Current meds:    cefepime  2,000 mg IntraVENous Q12H    silver sulfADIAZINE   Topical Daily    insulin glargine  5 Units SubCUTAneous Nightly    sodium chloride (Inhalant)  4 mL Nebulization Q4H    furosemide  80 mg IntraVENous TID    chlorothiazide (DIURIL) IVPB  500 mg IntraVENous Q12H    linezolid  600 mg IntraVENous Q12H    famotidine  20 mg Per NG tube Daily    heparin (porcine)  5,000 Units SubCUTAneous 3 times per day    sodium hypochlorite   Irrigation Daily    collagenase   Topical Daily    sodium chloride flush  5-40 mL IntraVENous 2 times per day    aspirin  81 mg Oral Daily    [Held by provider] clopidogrel  75 mg Oral Daily    ipratropium-albuterol  1 ampule Inhalation Q4H WA      dexmedetomidine HCl in NaCl Stopped (02/20/23 1135)    dextrose      sodium chloride 10 mL/hr at 02/21/23 0644         I/O last 3 completed shifts: In: 2404.8 [I.V.:398.3; NG/GT:835; IV Piggyback:1171.5]  Out: 3256 [Urine:4740;  Chest Tube:390]    CBC:   Recent Labs     02/19/23  0400   WBC 8.3   HGB 7.7*             Recent Labs     02/19/23  0400 02/20/23  0530 02/21/23  0400   * 131* 135   K 4.4 4.1 4.1   CL 97* 95* 97*   CO2 27 29 28   BUN 44* 54* 60*   CREATININE 3.2* 2.9* 2.9*   GLUCOSE 212* 270* 147*       Lab Results   Component Value Date    CALCIUM 8.1 (L) 02/21/2023    PHOS 3.0 02/21/2023       Objective:     Vitals: /64   Pulse 70   Temp 98 °F (36.7 °C) (Oral)   Resp 20   Ht 6' (1.829 m)   Wt 279 lb 1.6 oz (126.6 kg)   SpO2 99%   BMI 37.85 kg/m² ,    General appearance:   alert, awake, emotional at least partially oriented  HEENT:   positive conjunctival pallor  Neck:   supple with supplemental oxygen- right IJ temporary dialysis catheter  Lungs:   some admissions breath sounds, limited exam  Heart:   seems regular rate and rhythm, left distal implanted cardiac device  Abdomen:  soft, nontender on palpation  Extremities:   bilateral leg edema, leg wound, has a Fuller cath      Problem List :         Impression :      stage III acute kidney disease on top of CKD stage IV A3- good urine output stable so far remain off dialysis   recent shock syndrome, sepsis or suspected, extubated- multiple organ function that are involved are improving   he does have atherosclerotic cardiovascular disease, aftermath of acute illness and respiratory failure    Recommendation/Plan  :      de-escalate diuretics now- hopefully he will able to swallow and eat by mouth- proBNP level tomorrow morning- in the past he was very sensitive to diuretics- as there is no test we can run to decide- the dosing of the diuretics and the timing- so we will use our best judgment based on the information we have - we will adjust the diuretic dose, watch for iatrogenic and nosocomial complication- once  I am comfortable with the kidney function- and the likelihood of needing dialysis is very low- then I will remove the dialysis catheter- continue supportive care, adjust other medication as clinically indicated and follow clinically     Jaylene Palma MD MD

## 2023-02-21 NOTE — PROGRESS NOTES
Speech Language Pathology  Facility/Department: [facility] ICU   CLINICAL BEDSIDE SWALLOW EVALUATION    NAME: Remy Navarro  : 1950  MRN: [id number]    IMPRESSIONS: Remy Navarro was referred for bedside swallow evaluation after being admitted to [facility] with acute respiratory failure. Pt was intubated , attempted extubation  and pt was subsequently re-intubated. Pt was extubated . Medical hx includes DM, HTN, hyperlipidemia, acid reflux, CHF, CAD, stroke, CKD. No prior hx of dysphagia. Pt was seen for evaluation seated upright in bed, alert, cooperative, pleasant, motivated. Oral mechanism was [place] with no asymmetry. Pt with noted productive cough prior to PO trials. Pt was presented PO trials of ice chips, thins via tsp/cup, nectar thick liquids via tsp,cup/straw, puree, soft solids, and regular solids. Oral phase was mildly impaired characterized by intact labial seal, prolonged mastication, oral holding, slightly delayed AP transfer, and minimal oral residue of solids which cleared with liquid wash. Pharyngeal phase was mildly impaired with delayed and audible/uncoordinated swallow noted with ice chips and thins via tsp/cup. Pt also with coughing noted post swallow of ice chips and thins via tsp. Pt with timely swallow initiation and no s/s of aspiration noted with nectar thick liquids via tsp/cup/straw, soft and regular solids. Pt presents with mild oropharyngeal dysphagia. Recommend initiate soft and bite-sized diet and nectar thick liquids with feed assist. SLP will follow for monitoring of diet tolerance and po trials of advanced diet. Results/recommendations d/w pt and nurse. ADMISSION DATE: 2023  ADMITTING DIAGNOSIS: has PAD (peripheral artery disease) ([facility] [address] Chronic coronary artery disease; Biventricular ICD (implantable cardioverter-defibrillator) in place; Chronic combined systolic and diastolic heart failure ([address]  Chronic kidney disease, stage III (moderate) ([facility]); Mixed hyperlipidemia; Sick sinus syndrome (HCC); Type 2 diabetes mellitus with diabetic polyneuropathy (HCC); Spinal stenosis of lumbar region; Obesity, Class I, BMI 30-34.9; S/P partial colectomy; Tubulovillous adenoma of colon; Microalbuminuria; WD-PVD (peripheral vascular disease) ([facility]); Limb ischemia; Necrotic toes ([facility]); Toe gangrene ([facility]); Diabetic foot infection ([facility]); Chronic kidney disease (CKD) stage G3a/A2, moderately decreased glomerular filtration rate (GFR) between 45-59 mL/min/1.73 square meter and albuminuria creatinine ratio between  mg/g ([facility]); Edema; ICD (implantable cardioverter-defibrillator) battery depletion; Hyperkalemia; 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[facility]); Ischemia of toe; Acute kidney injury ([facility]); Fluid overload; DM (diabetes mellitus) ([facility]); Precordial pain; Acute chest pain; Unstable angina ([facility]); Chronic kidney disease (CKD) stage G3a/A3, moderately decreased glomerular filtration rate (GFR) between 45-59 mL/min/1.73 square meter and albuminuria creatinine ratio greater than 300 mg/g ([facility]); Cardiomyopathy ([facility]); Diabetic neuropathy ([facility]); HTN (hypertension); Epigastric pain; Acute on chronic congestive heart failure ([facility]); Leg edema; Acute on chronic systolic CHF (congestive heart failure) ([facility]); Diabetic foot ulcer with osteomyelitis ([facility]); Visit for wound check; Moderate malnutrition ([facility]); Long term (current) use of antibiotics; WD-Diabetic ulcer of toe of right foot associated with type 2 diabetes mellitus, with fat layer exposed ([facility]); Diabetic ulcer of toe associated with type 2 diabetes mellitus, with bone involvement without evidence of necrosis ([facility]); 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; Persistent wound pain; Receiving intravenous antibiotic treatment as outpatient; Acute on chronic respiratory failure with hypoxemia ([facility]); WD-Chronic foot ulcer, left, with necrosis of bone (HCC); Acute on chronic HFrEF (heart failure with reduced ejection fraction) ([facility]); Scrotal edema; Hypertensive urgency;  Diabetic ulcer of right foot due to type 2 diabetes mellitus ([address] Cellulitis of foot; Toe osteomyelitis ([address] Heart failure exacerbated by sotalol [facility]); CHF (congestive heart failure), NYHA class I, acute on chronic, combined ([address] Acute on chronic diastolic CHF (congestive heart failure) ([address] Leg swelling; Acute on chronic diastolic heart failure ([address] Skin ulcer of left foot including toes with fat layer exposed ([address] Superficial incisional surgical site infection; Bacteremia due to Enterococcus; Acute respiratory failure with hypoxia and hypercapnia ([address] Acute kidney injury superimposed on CKD ([address] Diabetic foot ([address] Diabetic ulcer of midfoot associated with diabetes mellitus due to underlying condition, with muscle involvement without evidence of necrosis ([address] and Other seizures ([address] on their problem list.  ONSET DATE: this admission    Recent Chest Xray/CT of Chest: see chart    Date of Eval: 2/21/2023  Evaluating Therapist: CELY Bolanos    Current Diet level:  Current Diet : NPO      Primary Complaint       Pain:  Pain Assessment  Pain Assessment: None - Denies Pain  Pain Level: 7  Patient's Stated Pain Goal: Unable to verbalize/indicate pain goal  Pain Location: Toe (Comment which one), Leg, Ankle  Pain Descriptors: Aching  Non-Pharmaceutical Pain Intervention(s): Repositioned    Reason for Referral  Helen Bernstein was referred for a bedside swallow evaluation to assess the efficiency of his swallow function, identify signs and symptoms of aspiration and make recommendations regarding safe dietary consistencies, effective compensatory strategies, and safe eating environment. Impression  Dysphagia Diagnosis: Mild oral stage dysphagia;Mild pharyngeal stage dysphagia  Dysphagia Impression : Mild oropharyngeal dysphagia  Dysphagia Outcome Severity Scale: Level 5: Mild dysphagia- Distant supervision.  May need one diet consistency restricted     Treatment Plan  Requires SLP Intervention: Yes  Duration of Treatment: length of stay  D/C Recommendations: To be determined       Recommended Diet and Intervention           Recommendations: Assistance with meals; Total feed;Dysphagia treatment;Setup assist  Therapeutic Interventions: Diet tolerance monitoring; Therapeutic PO trials with SLP;Patient/Family education    Compensatory Swallowing Strategies  Compensatory Swallowing Strategies : Alternate solids and liquids;Eat/Feed slowly; Assist feed; Set up assist;Small bites/sips    Treatment/Goals  Short-term Goals  Timeframe for Short-term Goals: Length of stay  Goal 1: Pt will tolerate soft and bite-sized diet and nectar thick liquids with no s/s of aspiration/  Goal 2: Pt will tolerate PO trials of advanced diet with no s/s of aspiration  Goal 3: Pt/caregivers will demonstrate understanding of recommendations and POC    General  Chart Reviewed: Yes  Behavior/Cognition: Alert; Cooperative;Pleasant mood  Respiratory Status: O2 via nasual cannula  Communication Observation: Functional  Follows Directions: Simple  Dentition: Adequate  Patient Positioning: Upright in bed  Baseline Vocal Quality: Normal  Prior Dysphagia History: none known prior to admission  Consistencies Administered: Regular;Pureed;Mildly Thick- teaspoon;Mildly Thick - straw;Mildly Thick - cup; Ice Chips; Thin - teaspoon; Thin - cup; Soft and Bite-Sized           Vision/Hearing  Vision  Vision: Within Functional Limits  Hearing  Hearing: Within functional limits    Oral Motor Deficits       Oral Phase Dysfunction  Oral Phase  Oral Phase: Exceptions     Indicators of Pharyngeal Phase Dysfunction   Pharyngeal Phase   Pharyngeal Phase: mildly impaired    Prognosis  Individuals consulted  Consulted and agree with results and recommendations: Patient;RN  RN Name: Bharat Crowley  Patient Education: results/recommendations  Patient Education Response: Verbalizes understanding;Demonstrated understanding;Needs reinforcement             Therapy Time  SLP Individual Minutes  Time In: 0845  Time Out: 0218  Minutes: [address] [facility] Aleksander Johnathan [address]   2/21/2023 9:27 AM

## 2023-02-21 NOTE — PROGRESS NOTES
V2.0  [facility] [place] [facility] Progress [place]      Name:  Tegan Adamson /Age/Sex: 1950  (67 y.o. male)   MRN & CSN:  [id number] & [id number] Encounter Date/Time: 2023 4:09 PM EST    Location:  -A PCP: Zeferino Villanueva Day: 11    Assessment and Plan:   Tegan Adamson is a 67 y.o. male who presents with Acute respiratory failure with hypoxia and hypercapnia (HCC)    Acute on chronic renal failure requiring intermittent dialysis support  Acute respiratory failure with hypoxia and hypercapnia, requiring intubation, extubated on  with immediate re-intubation, s/p extubation   In patient cardio respiratory arrest on   Acute metabolic encephalopathy  Large right-sided pleural effusion s/p chest tube placement on   Hyperkalemia-resolved  Acute metabolic encephalopathy  Chronic LE wounds- improving  HTN  CAD  Heart failure with preserved ejection fraction  Large right pleural effusion     Plan:     Neuro -extubated, tolerating well no focal neurological deficits verbalizing, following commands    CV - acute on chronic HF. EF 50-55% in . Resp - s/p extubation , tolerating well on 4 L nasal cannula, cough and sputum production noted, continue DuoNebs and respiratory treatments, right sided chest tube. GI -speech evaluation, dysphagia diet ordered. GI prophylaxis: Pepcid      - NANCY on CKD 3/4. Baseline Cr 2.2. adm Cr 4.8. sCr now 2.9; CRRT has been stopped. Nephrology following. De-escalating diuretics. Continue to monitor need for dialysis. Urology consulted for difficult parkinson; Distal urethral stricture with incomplete bladder emptying-cystoscopy/TRUS as outpatient if voiding issues persist, voiding trial prior to discharge     ID -Bilateral lower extremity wounds; wound cultures on -positive for Alcaligenes faecalis, Staph aureus MRSA, Finegoldia magna. Antibiotics changed to cefepime and Zyvox continue.   White blood count stable   Wound culture positive for Pseudomonas aeruginosa, Citrobacter Freundii, MRSA, Enterococcus faecalis     Heme -hemoglobin-stable. Between 7- 8 mg/dL     MSK - LE wounds bilaterally. Wound care consult. Wet-dry for now with xeroform. Antibiotics as above, general surgery following      Patient is stable for transfer to the stepdown unit today. Transfer orders placed. Critical care team will continue to follow patient while he is physically in the ICU. We will sign off once he is transferred. Diet ADULT DIET; Dysphagia - Soft and Bite Sized; Mildly Thick (Nectar)   DVT Prophylaxis [] Lovenox, [x]  Heparin, [] SCDs, [] Ambulation,  [] Eliquis, [] Xarelto  [] Coumadin, GI PPX- Pepcid   Code Status Full Code   Disposition From: Home  Expected Disposition: TBD  Estimated Date of Discharge: TBD  Patient requires continued admission due to respiratory failure   Surrogate Decision Maker/ POA Children      Subjective:   Patient seen and examined this AM.  Labs and vitals reviewed. No acute events overnight. He is doing well since being extubated, on 4 L nasal cannula. He denies any pain at this time. He denies shortness of breath or chest pain. He reports he is feeling better, and thankful. He denies any abdominal pain or nausea. He denies any new weaknesses. Plan of care discussed with bedside RN  Review of Systems:    Review of Systems  As above, a 10 point review of systems was completed. All negative except what is mentioned above. Objective: Intake/Output Summary (Last 24 hours) at 2/21/2023 1528  Last data filed at 2/21/2023 1350  Gross per 24 hour   Intake 968.36 ml   Output 2170 ml   Net -1201.64 ml          Vitals:   Vitals:    02/21/23 1454   BP:    Pulse:    Resp: 14   Temp:    SpO2:        Physical Exam:     General: Patient is a 77-year-old male, NAD  Eyes: EOMI  ENT: neck supple,   Cardiovascular: Regular rate. Respiratory:bilateral rhonchi, no wheezing.     Gastrointestinal: Obese, soft, non tender  Genitourinary: no suprapubic tenderness, Fuller catheter in place.  Musculoskeletal: 2+ lower extremity edema, chronic wounds on bilateral lower extremities- dressing clean/dry/intact  Skin: warm, dry  Neuro: Alert, no focal neurological deficits  psych: Pleasant    Medications:   Medications:    cefepime  2,000 mg IntraVENous Q12H    silver sulfADIAZINE   Topical Daily    insulin glargine  5 Units SubCUTAneous Nightly    sodium chloride (Inhalant)  4 mL Nebulization Q4H    furosemide  80 mg IntraVENous TID    linezolid  600 mg IntraVENous Q12H    famotidine  20 mg Per NG tube Daily    heparin (porcine)  5,000 Units SubCUTAneous 3 times per day    sodium hypochlorite   Irrigation Daily    collagenase   Topical Daily    sodium chloride flush  5-40 mL IntraVENous 2 times per day    aspirin  81 mg Oral Daily    [Held by provider] clopidogrel  75 mg Oral Daily    ipratropium-albuterol  1 ampule Inhalation Q4H WA      Infusions:    dextrose      sodium chloride 10 mL/hr at 02/21/23 0644     PRN Meds: HYDROmorphone, 1 mg, Q4H PRN  albuterol, 2.5 mg, Q2H PRN  heparin flush, 240 Units, PRN  glucose, 4 tablet, PRN  dextrose bolus, 125 mL, PRN   Or  dextrose bolus, 250 mL, PRN  glucagon (rDNA), 1 mg, PRN  dextrose, , Continuous PRN  sodium chloride flush, 10 mL, PRN  sodium chloride, , PRN      Labs      Recent Results (from the past 24 hour(s))   POCT Glucose    Collection Time: 02/20/23  4:44 PM   Result Value Ref Range    POC Glucose 176 (H) 70 - 99 MG/DL   POCT Glucose    Collection Time: 02/20/23  7:59 PM   Result Value Ref Range    POC Glucose 163 (H) 70 - 99 MG/DL   POCT Glucose    Collection Time: 02/21/23 12:15 AM   Result Value Ref Range    POC Glucose 202 (H) 70 - 99 MG/DL   Comprehensive Metabolic Panel    Collection Time: 02/21/23  4:00 AM   Result Value Ref Range    Sodium 135 135 - 145 MMOL/L    Potassium 4.1 3.5 - 5.1 MMOL/L    Chloride 97 (L) 99 - 110 mMol/L    CO2 28 21 - 32 MMOL/L    BUN 60 (H) 6  - 23 MG/DL    Creatinine 2.9 (H) 0.9 - 1.3 MG/DL    Est, Glom Filt Rate 22 (L) >60 mL/min/1.73m2    Glucose 147 (H) 70 - 99 MG/DL    Calcium 8.1 (L) 8.3 - 10.6 MG/DL    Albumin 2.5 (L) 3.4 - 5.0 GM/DL    Total Protein 5.9 (L) 6.4 - 8.2 GM/DL    Total Bilirubin 0.7 0.0 - 1.0 MG/DL    ALT 9 (L) 10 - 40 U/L    AST 17 15 - 37 IU/L    Alkaline Phosphatase 77 40 - 128 IU/L    Anion Gap 10 4 - 16   Phosphorus    Collection Time: 02/21/23  4:00 AM   Result Value Ref Range    Phosphorus 3.0 2.5 - 4.9 MG/DL   Magnesium    Collection Time: 02/21/23  4:00 AM   Result Value Ref Range    Magnesium 1.9 1.8 - 2.4 mg/dl   POCT Glucose    Collection Time: 02/21/23  8:36 AM   Result Value Ref Range    POC Glucose 152 (H) 70 - 99 MG/DL        Imaging/Diagnostics Last 24 Hours   XR CHEST PORTABLE    Result Date: 2/11/2023  EXAMINATION: ONE XRAY VIEW OF THE CHEST 2/11/2023 7:29 pm COMPARISON: Chest radiograph 02/11/2023 HISTORY: ORDERING SYSTEM PROVIDED HISTORY: ETT placement TECHNOLOGIST PROVIDED HISTORY: Reason for exam:->ETT placement Reason for Exam: et and og tube placement confirmation Additional signs and symptoms: et and og tube placement confrimation FINDINGS: Lines and tubes: -ETT terminates at the superior margin of the clavicles. -enteric tube takes a the subdiaphragmatic course and terminates out of the field of view -right-sided Cordis catheter, which terminates within the mid/upper SVC Lungs: There is indistinctness of the pulmonary vasculature with patchy opacities within the right greater than left lungs. . Pleura: Small right-sided pleural effusion. Cardiomediastinal silhouette: Enlarged cardiac silhouette. Bones: No acute osseous findings. Soft tissues: Left-sided 2 lead cardiac device. Lines and tubes as above. The ETT terminates at the level of the superior margin of the clavicles. Grossly unchanged pulmonary exam.  Findings favor cardiogenic pulmonary edema.   However a superimposed infectious process cannot be excluded.          Electronically signed by MICKY Valenzuela CNP on 2/21/2023 at 3:28 PM

## 2023-02-21 NOTE — PLAN OF CARE
Problem: Nutrition Deficit:  Goal: Optimize nutritional status  2/20/2023 2134 by Mandy Cerna RN  Outcome: Not Progressing  2/20/2023 1544 by Ashley Vincent RN  Outcome: Progressing     Problem: Skin/Tissue Integrity  Goal: Absence of new skin breakdown  Description: 1. Monitor for areas of redness and/or skin breakdown  2. Assess vascular access sites hourly  3. Every 4-6 hours minimum:  Change oxygen saturation probe site  4. Every 4-6 hours:  If on nasal continuous positive airway pressure, respiratory therapy assess nares and determine need for appliance change or resting period. 2/20/2023 2134 by Mandy Cerna RN  Outcome: Not Progressing  2/20/2023 1544 by Ashley Vincent RN  Outcome: Progressing     Problem: Safety - Adult  Goal: Free from fall injury  2/20/2023 2134 by Mandy Cerna RN  Outcome: Not Progressing  2/20/2023 1544 by Ashley Vincent RN  Outcome: Progressing     Problem: Discharge Planning  Goal: Discharge to home or other facility with appropriate resources  2/20/2023 1544 by Ashley Vincent RN  Outcome: Progressing     Problem: Pain  Goal: Verbalizes/displays adequate comfort level or baseline comfort level  2/20/2023 1544 by Ashley Vincent RN  Outcome: Progressing     Problem: Chronic Conditions and Co-morbidities  Goal: Patient's chronic conditions and co-morbidity symptoms are monitored and maintained or improved  2/20/2023 1544 by Ashley Vincent RN  Outcome: Progressing     Problem: ABCDS Injury Assessment  Goal: Absence of physical injury  2/20/2023 1544 by Ashley Vincent RN  Outcome: Progressing     Problem: Nutrition Deficit:  Goal: Optimize nutritional status  2/20/2023 2134 by Mandy Cerna RN  Outcome: Not Progressing  2/20/2023 1544 by Ashley Vincent RN  Outcome: Progressing     Problem: Skin/Tissue Integrity  Goal: Absence of new skin breakdown  Description: 1. Monitor for areas of redness and/or skin breakdown  2. Assess vascular access sites hourly  3.   Every 4-6 hours minimum:  Change oxygen saturation probe site  4. Every 4-6 hours:  If on nasal continuous positive airway pressure, respiratory therapy assess nares and determine need for appliance change or resting period.   2/20/2023 2134 by Ariela Barber RN  Outcome: Not Progressing  2/20/2023 1544 by Allen Ibanez RN  Outcome: Progressing     Problem: Safety - Adult  Goal: Free from fall injury  2/20/2023 2134 by Ariela Barber RN  Outcome: Not Progressing  2/20/2023 1544 by Allen Ibanez RN  Outcome: Progressing

## 2023-02-21 NOTE — CARE COORDINATION
LSW attempted to visit with pt, however, nrg staff providing bedside care. LSW called pt's next of kin, dtr/Jaz, to discuss dc planning. Jaz reported she is encouraged how well pt is doing after extubation. LSW discussed poss need for SNF. Jaz reported family would prefer pt return home, as she does not work and can provide 24hr care for pt. Jaz reported prior to admission, pt was able to pivot from bed to hover round electric scooter. Jaz requested RX for hospital bed. Jaz denied need for additional DME. Pt reported pt is active with [facility] and would services to resume upon dc. LSW advised PT/OT evals pend and [facility] staff to f/u after assessment to discuss recs. LSW requested PT/OT eval for dc recs via therapy whiteboard. LSW requested RX for hospital bed via sticky notes to attending MD.  Dc plan TBD at this time; although family prefers home with 24hr care and cont'd [facility].   Electronically signed by ROXANN Singh on 2/21/2023 at 9:55 AM

## 2023-02-22 ENCOUNTER — APPOINTMENT (OUTPATIENT)
Dept: GENERAL RADIOLOGY | Age: 73
DRG: 981 | End: 2023-02-22
Payer: MEDICARE

## 2023-02-22 LAB
ALBUMIN SERPL-MCNC: 2.8 GM/DL (ref 3.4–5)
ALP BLD-CCNC: 70 IU/L (ref 40–128)
ALT SERPL-CCNC: 8 U/L (ref 10–40)
ANION GAP SERPL CALCULATED.3IONS-SCNC: 11 MMOL/L (ref 4–16)
AST SERPL-CCNC: 15 IU/L (ref 15–37)
BASOPHILS ABSOLUTE: 0.1 K/CU MM
BASOPHILS RELATIVE PERCENT: 1 % (ref 0–1)
BILIRUB SERPL-MCNC: 0.6 MG/DL (ref 0–1)
BUN SERPL-MCNC: 63 MG/DL (ref 6–23)
CALCIUM SERPL-MCNC: 8.2 MG/DL (ref 8.3–10.6)
CHLORIDE BLD-SCNC: 96 MMOL/L (ref 99–110)
CO2: 29 MMOL/L (ref 21–32)
CREAT SERPL-MCNC: 3.1 MG/DL (ref 0.9–1.3)
DIFFERENTIAL TYPE: ABNORMAL
EOSINOPHILS ABSOLUTE: 0.1 K/CU MM
EOSINOPHILS RELATIVE PERCENT: 2.8 % (ref 0–3)
GFR SERPL CREATININE-BSD FRML MDRD: 21 ML/MIN/1.73M2
GLUCOSE BLD-MCNC: 168 MG/DL (ref 70–99)
GLUCOSE BLD-MCNC: 179 MG/DL (ref 70–99)
GLUCOSE BLD-MCNC: 181 MG/DL (ref 70–99)
GLUCOSE SERPL-MCNC: 174 MG/DL (ref 70–99)
HCT VFR BLD CALC: 26.6 % (ref 42–52)
HEMOGLOBIN: 7.8 GM/DL (ref 13.5–18)
IMMATURE NEUTROPHIL %: 0.2 % (ref 0–0.43)
LYMPHOCYTES ABSOLUTE: 1.1 K/CU MM
LYMPHOCYTES RELATIVE PERCENT: 21.3 % (ref 24–44)
MAGNESIUM: 2 MG/DL (ref 1.8–2.4)
MCH RBC QN AUTO: 26.2 PG (ref 27–31)
MCHC RBC AUTO-ENTMCNC: 29.3 % (ref 32–36)
MCV RBC AUTO: 89.3 FL (ref 78–100)
MONOCYTES ABSOLUTE: 0.5 K/CU MM
MONOCYTES RELATIVE PERCENT: 10.6 % (ref 0–4)
NUCLEATED RBC %: 0 %
PDW BLD-RTO: 19.7 % (ref 11.7–14.9)
PHOSPHORUS: 3.7 MG/DL (ref 2.5–4.9)
PLATELET # BLD: 206 K/CU MM (ref 140–440)
PMV BLD AUTO: 9.6 FL (ref 7.5–11.1)
POTASSIUM SERPL-SCNC: 4.3 MMOL/L (ref 3.5–5.1)
PRO-BNP: ABNORMAL PG/ML
RBC # BLD: 2.98 M/CU MM (ref 4.6–6.2)
SEGMENTED NEUTROPHILS ABSOLUTE COUNT: 3.2 K/CU MM
SEGMENTED NEUTROPHILS RELATIVE PERCENT: 64.1 % (ref 36–66)
SODIUM BLD-SCNC: 136 MMOL/L (ref 135–145)
TOTAL IMMATURE NEUTOROPHIL: 0.01 K/CU MM
TOTAL NUCLEATED RBC: 0 K/CU MM
TOTAL PROTEIN: 6.2 GM/DL (ref 6.4–8.2)
WBC # BLD: 5 K/CU MM (ref 4–10.5)

## 2023-02-22 PROCEDURE — 2700000000 HC OXYGEN THERAPY PER DAY

## 2023-02-22 PROCEDURE — 6360000002 HC RX W HCPCS: Performed by: NURSE PRACTITIONER

## 2023-02-22 PROCEDURE — 97535 SELF CARE MNGMENT TRAINING: CPT

## 2023-02-22 PROCEDURE — 6370000000 HC RX 637 (ALT 250 FOR IP): Performed by: STUDENT IN AN ORGANIZED HEALTH CARE EDUCATION/TRAINING PROGRAM

## 2023-02-22 PROCEDURE — 2580000003 HC RX 258: Performed by: NURSE PRACTITIONER

## 2023-02-22 PROCEDURE — 82962 GLUCOSE BLOOD TEST: CPT

## 2023-02-22 PROCEDURE — 94761 N-INVAS EAR/PLS OXIMETRY MLT: CPT

## 2023-02-22 PROCEDURE — 97530 THERAPEUTIC ACTIVITIES: CPT

## 2023-02-22 PROCEDURE — 94640 AIRWAY INHALATION TREATMENT: CPT

## 2023-02-22 PROCEDURE — 83735 ASSAY OF MAGNESIUM: CPT

## 2023-02-22 PROCEDURE — 92526 ORAL FUNCTION THERAPY: CPT

## 2023-02-22 PROCEDURE — 2580000003 HC RX 258: Performed by: STUDENT IN AN ORGANIZED HEALTH CARE EDUCATION/TRAINING PROGRAM

## 2023-02-22 PROCEDURE — 6370000000 HC RX 637 (ALT 250 FOR IP): Performed by: PHYSICIAN ASSISTANT

## 2023-02-22 PROCEDURE — 85025 COMPLETE CBC W/AUTO DIFF WBC: CPT

## 2023-02-22 PROCEDURE — 92610 EVALUATE SWALLOWING FUNCTION: CPT

## 2023-02-22 PROCEDURE — 80053 COMPREHEN METABOLIC PANEL: CPT

## 2023-02-22 PROCEDURE — 97162 PT EVAL MOD COMPLEX 30 MIN: CPT

## 2023-02-22 PROCEDURE — 71045 X-RAY EXAM CHEST 1 VIEW: CPT

## 2023-02-22 PROCEDURE — 83880 ASSAY OF NATRIURETIC PEPTIDE: CPT

## 2023-02-22 PROCEDURE — 84100 ASSAY OF PHOSPHORUS: CPT

## 2023-02-22 PROCEDURE — 6370000000 HC RX 637 (ALT 250 FOR IP): Performed by: NURSE PRACTITIONER

## 2023-02-22 PROCEDURE — 2060000000 HC ICU INTERMEDIATE R&B

## 2023-02-22 RX ORDER — METRONIDAZOLE 250 MG/1
500 TABLET ORAL EVERY 8 HOURS SCHEDULED
Status: DISCONTINUED | OUTPATIENT
Start: 2023-02-22 | End: 2023-03-07 | Stop reason: HOSPADM

## 2023-02-22 RX ORDER — LINEZOLID 600 MG/1
600 TABLET, FILM COATED ORAL EVERY 12 HOURS SCHEDULED
Status: DISPENSED | OUTPATIENT
Start: 2023-02-22 | End: 2023-03-01

## 2023-02-22 RX ORDER — LEVOFLOXACIN 500 MG/1
750 TABLET, FILM COATED ORAL EVERY OTHER DAY
Status: COMPLETED | OUTPATIENT
Start: 2023-02-22 | End: 2023-02-28

## 2023-02-22 RX ADMIN — HYDROMORPHONE HYDROCHLORIDE 1 MG: 1 INJECTION, SOLUTION INTRAMUSCULAR; INTRAVENOUS; SUBCUTANEOUS at 05:17

## 2023-02-22 RX ADMIN — IPRATROPIUM BROMIDE AND ALBUTEROL SULFATE 1 AMPULE: 2.5; .5 SOLUTION RESPIRATORY (INHALATION) at 21:23

## 2023-02-22 RX ADMIN — LINEZOLID 600 MG: 600 TABLET ORAL at 22:01

## 2023-02-22 RX ADMIN — HEPARIN SODIUM 5000 UNITS: 5000 INJECTION INTRAVENOUS; SUBCUTANEOUS at 21:59

## 2023-02-22 RX ADMIN — IPRATROPIUM BROMIDE AND ALBUTEROL SULFATE 1 AMPULE: 2.5; .5 SOLUTION RESPIRATORY (INHALATION) at 11:50

## 2023-02-22 RX ADMIN — SILVER SULFADIAZINE: 10 CREAM TOPICAL at 10:29

## 2023-02-22 RX ADMIN — LEVOFLOXACIN 750 MG: 500 TABLET, FILM COATED ORAL at 13:07

## 2023-02-22 RX ADMIN — INSULIN GLARGINE 5 UNITS: 100 INJECTION, SOLUTION SUBCUTANEOUS at 21:59

## 2023-02-22 RX ADMIN — HEPARIN SODIUM 5000 UNITS: 5000 INJECTION INTRAVENOUS; SUBCUTANEOUS at 05:17

## 2023-02-22 RX ADMIN — IPRATROPIUM BROMIDE AND ALBUTEROL SULFATE 1 AMPULE: 2.5; .5 SOLUTION RESPIRATORY (INHALATION) at 16:30

## 2023-02-22 RX ADMIN — SODIUM HYPOCHLORITE: 1.25 SOLUTION TOPICAL at 10:32

## 2023-02-22 RX ADMIN — SODIUM CHLORIDE, PRESERVATIVE FREE 10 ML: 5 INJECTION INTRAVENOUS at 22:02

## 2023-02-22 RX ADMIN — HYDROMORPHONE HYDROCHLORIDE 1 MG: 1 INJECTION, SOLUTION INTRAMUSCULAR; INTRAVENOUS; SUBCUTANEOUS at 13:27

## 2023-02-22 RX ADMIN — METRONIDAZOLE 500 MG: 250 TABLET ORAL at 13:07

## 2023-02-22 RX ADMIN — IPRATROPIUM BROMIDE AND ALBUTEROL SULFATE 1 AMPULE: 2.5; .5 SOLUTION RESPIRATORY (INHALATION) at 08:20

## 2023-02-22 RX ADMIN — HEPARIN SODIUM 5000 UNITS: 5000 INJECTION INTRAVENOUS; SUBCUTANEOUS at 13:29

## 2023-02-22 RX ADMIN — METRONIDAZOLE 500 MG: 250 TABLET ORAL at 22:01

## 2023-02-22 RX ADMIN — LINEZOLID 600 MG: 600 TABLET ORAL at 13:06

## 2023-02-22 RX ADMIN — COLLAGENASE SANTYL: 250 OINTMENT TOPICAL at 10:25

## 2023-02-22 RX ADMIN — Medication 4 ML: at 03:57

## 2023-02-22 RX ADMIN — SODIUM CHLORIDE, PRESERVATIVE FREE 10 ML: 5 INJECTION INTRAVENOUS at 10:32

## 2023-02-22 RX ADMIN — HYDROMORPHONE HYDROCHLORIDE 1 MG: 1 INJECTION, SOLUTION INTRAMUSCULAR; INTRAVENOUS; SUBCUTANEOUS at 18:28

## 2023-02-22 ASSESSMENT — PAIN - FUNCTIONAL ASSESSMENT: PAIN_FUNCTIONAL_ASSESSMENT: PREVENTS OR INTERFERES SOME ACTIVE ACTIVITIES AND ADLS

## 2023-02-22 ASSESSMENT — PAIN DESCRIPTION - FREQUENCY: FREQUENCY: INTERMITTENT

## 2023-02-22 ASSESSMENT — PAIN DESCRIPTION - ORIENTATION
ORIENTATION: RIGHT;LEFT
ORIENTATION: RIGHT;LEFT

## 2023-02-22 ASSESSMENT — PAIN SCALES - GENERAL
PAINLEVEL_OUTOF10: 8
PAINLEVEL_OUTOF10: 9
PAINLEVEL_OUTOF10: 3
PAINLEVEL_OUTOF10: 3
PAINLEVEL_OUTOF10: 10

## 2023-02-22 ASSESSMENT — ENCOUNTER SYMPTOMS
CONSTIPATION: 0
SORE THROAT: 0
COUGH: 0
ABDOMINAL PAIN: 0
BACK PAIN: 0
SINUS PAIN: 0
CHEST TIGHTNESS: 0
DIARRHEA: 0
SHORTNESS OF BREATH: 0
NAUSEA: 0
EYE PAIN: 0
EYE REDNESS: 0
SINUS PRESSURE: 0
VOMITING: 0
EYE ITCHING: 0
WHEEZING: 0

## 2023-02-22 ASSESSMENT — PAIN DESCRIPTION - DESCRIPTORS: DESCRIPTORS: ACHING;CRAMPING

## 2023-02-22 ASSESSMENT — PULMONARY FUNCTION TESTS: PEFR_L/MIN: 100

## 2023-02-22 ASSESSMENT — PAIN DESCRIPTION - LOCATION
LOCATION: LEG;FOOT
LOCATION: FOOT
LOCATION: FOOT
LOCATION: LEG

## 2023-02-22 ASSESSMENT — PAIN DESCRIPTION - ONSET: ONSET: SUDDEN

## 2023-02-22 ASSESSMENT — PAIN DESCRIPTION - PAIN TYPE
TYPE: ACUTE PAIN
TYPE: CHRONIC PAIN

## 2023-02-22 NOTE — FLOWSHEET NOTE
Patients family very confrontational with hospital staff--education provided--emotional support given to patient and family.

## 2023-02-22 NOTE — PROGRESS NOTES
[address] [employer] SPEECH/LANGUAGE PATHOLOGY  DAILY PROGRESS NOTE  Judd Silva  2/22/2023  [id number]  Hyperkalemia [E87.5]  Elevated troponin [R77.8]  Acidosis, metabolic, with respiratory acidosis [E87.4]  Acute respiratory failure with hypoxia and hypercapnia (HCC) [J96.01, J96.02]  Acute on chronic heart failure, unspecified heart failure type (HCC) [I50.9]  Acute kidney injury superimposed on CKD ([facility] [address] [N17.9, N18.9]  Acute kidney injury superimposed on chronic kidney disease ([facility] [address] [N17.9, N18.9]  Allergies   Allergen Reactions    Pcn [Penicillins] Hives    Fentanyl Itching         Pt was seen this date for dysphagia treatment. IMPRESSION AND RECOMMENDATIONS:   Judd Silva was seen for swallow treatment seated upright in bed, pt was more confused and lethargic this day. Per d/w with nurse and chart review, pt with noted coughing and difficulty swallowing with breakfast (scrambled eggs and pancakes). SLP noted, pt with more coughing and throat clearing this day and prior to po trials, cued pt for hard cough and suction secretions. Pt seen with PO trials of nectar via straw, soft solids, puree, and thin via cup x1. Pt with no s/s of aspiration noted with nectar and puree. Oral phase was mildly impaired characterized by intact labial seal, prolonged mastication, slightly delayed AP transfer, and adequate oral clearance. Pharyngeal phase was mildly impaired with delayed and audible/uncoordinated swallow noted with thins via cup. Pt with throat clearing and delayed cough after thin trial. Pt also with delayed cough after soft solids, however unclear whether this was due to solids or baseline cough. Recommend downgrade to minced and moist diet and continue nectar thick liquids. Recommend continue feed assist. SLP will continue to follow for monitoring of diet tolerance and po trials of advanced diet. Results/recommendations d/w pt and nurse.       GOALS (current status in bold):  Short-term Goals  Timeframe for Short-term Goals: Length of stay  Goal 1: Pt will tolerate soft and bite-sized diet and nectar thick liquids with no s/s of aspiration Downgraded to minced and moist, tolerating NTL  Goal 2: Pt will tolerate PO trials of advanced diet with no s/s of aspiration progressing, cont. Goal 3: Pt/caregivers will demonstrate understanding of recommendations and POC progressing, cont.                             EDUCATION: results/recommendation and POC    PAIN RATING (0-10 Scale): n/a  Time in/Time out: SLP Individual Minutes  Time In: 6771  Time Out: 1100  Minutes: 25    Visit number: [id number] Saul, MS [facility]   2/22/2023  11:01 AM

## 2023-02-22 NOTE — PROGRESS NOTES
V2.0  [facility] Hospitalist Progress Note      Name:  Anna Castellon /Age/Sex: 1950  (67 y.o. male)   MRN & CSN:  [id number] & [id number] Encounter Date/Time: 2023 6:51 PM EST    Location:  -A PCP: Kameron Isaac Day: 12    Assessment and Plan:   Anna Castellon is a 67 y.o. male with pmh of  CAD, HFpEF, ICD, CKD, DM who presents with Acute respiratory failure with hypoxia and hypercapnia ([facility] [address]    Plan:  Acute hypoxic Hypercapneic respiratory failure: was initially placed on BIPAP but later was intubated on MV was started on Empiric broad spectrum. CXR (): worsening R base opacity and small L pleural effusion. On cefepime and linezolid - last dose tonight for both per CCM. Now extubated (2023) and on 5L/min when I saw him - PT was about to work with him. Wean O2 as able. Cardiac arrest due to hypoxia: underwent CPR after pulse became thready after patient was reintubated when he was not being ventilated. Chest tube was also placed. Now off dexmedetomidine   Acute renal failure with h/o CKD stage IV: was initially started on HD but could not tolerate. CRRT stopped. Follow up on nephro recs. Keep HD cath for now. Hyperkalemia-resolved: in the setting of above, improving  Elevated troponin: likely type 2 MI in the setting of respiratory as well as kidney failure, does have h/o CAD with multiple stents, Cardio on board. HFpEF:  EF 50-55% in . Repeat echo with simillar EF, hypokinetic RV with bowing of Interventricular septum towards LV. Chronic LE wounds: Wound care on board. Likely infected, Purulent drainage from RLE 1st metatarsal which is foul smelling. General surgery on consult, appreciate recs. Wound cultures sent - growing Pseudomonas aeruginosa, Citrobacter freundii MRSA, Enterococcus faecalis, Bacteroides thetaiotaomicron (with beta lactamase) (DOC metronidazole, cefoxitin with Zosyn).  Defer to ICU team. On linezolid 600mg IV BID X 7 days and Cefepime 2gm Q12H - both last dose tonight. Class II Obesity. BMI 37.85    IM will continue to follow while patient is in the ICU. IM will take over once pt. Is transferred out. Diet ADULT DIET; Dysphagia - Soft and Bite Sized; Mildly Thick ([place])  ADULT ORAL NUTRITION SUPPLEMENT; Lunch, Dinner; Diabetic Oral Supplement   DVT Prophylaxis [] Lovenox, [x]  Heparin, [] SCDs, [] Ambulation,  [] Eliquis, [] Xarelto  [] Coumadin   Code Status Full Code   Disposition From: Home  Expected Disposition: TBD  Estimated Date of Discharge: 1-2 days  Patient requires continued admission due to Respiratory and renal failure. Pending clearance from nephrology regarding dialysis. [facility] has already cleared patient for discharge. Surrogate Decision Maker/ POA      Subjective:     Chief Complaint: Respiratory Distress     Patient awake and alert. Oriented X 2 days. He said it was 2014. Pt. States he feels good except for B/L LE pain. PT was about to work with him. Noted pt. Was on 5L/min when seen. Review of Systems:    Review of Systems   Constitutional:  Negative for appetite change, chills, fatigue, fever and unexpected weight change. HENT:  Negative for sinus pressure, sinus pain and sore throat. Eyes:  Negative for pain, redness and itching. Respiratory:  Negative for cough, chest tightness, shortness of breath and wheezing. Cardiovascular:  Negative for chest pain, palpitations and leg swelling. Gastrointestinal:  Negative for abdominal pain, constipation, diarrhea, nausea and vomiting. Endocrine: Negative for polydipsia, polyphagia and polyuria. Genitourinary:  Negative for decreased urine volume, difficulty urinating, dysuria, frequency, hematuria and urgency. Musculoskeletal:  Positive for myalgias. Negative for arthralgias and back pain. Skin:  Positive for wound. Negative for pallor and rash.    Neurological:  Negative for dizziness, tremors, seizures, syncope, weakness, light-headedness, numbness and headaches. Psychiatric/Behavioral:  Negative for agitation and confusion. Objective: Intake/Output Summary (Last 24 hours) at 2/22/2023 1035  Last data filed at 2/22/2023 0300  Gross per 24 hour   Intake 975 ml   Output 2010 ml   Net -1035 ml          Vitals:   Vitals:    02/22/23 1000   BP: 117/81   Pulse: 75   Resp: 15   Temp:    SpO2: 98%       Physical Exam:   Physical Exam  Vitals and nursing note reviewed. Constitutional:       General: He is not in acute distress. Appearance: He is obese. He is not ill-appearing, toxic-appearing or diaphoretic. Interventions: Nasal cannula in place. Comments: 5L/min   HENT:      Head: Normocephalic and atraumatic. Right Ear: External ear normal.      Left Ear: External ear normal.      Nose: Nose normal.      Mouth/Throat:      Mouth: Mucous membranes are moist.      Pharynx: Oropharynx is clear. Eyes:      General: No scleral icterus. Right eye: No discharge. Left eye: No discharge. Conjunctiva/sclera: Conjunctivae normal.      Pupils: Pupils are equal, round, and reactive to light. Cardiovascular:      Rate and Rhythm: Normal rate and regular rhythm. Pulses: Normal pulses. Heart sounds: Normal heart sounds. No murmur heard. No friction rub. No gallop. Pulmonary:      Effort: Pulmonary effort is normal. No respiratory distress. Breath sounds: Normal breath sounds. Decreased air movement and transmitted upper airway sounds (Improved) present. No stridor. No decreased breath sounds, wheezing, rhonchi or rales. Abdominal:      General: Bowel sounds are normal. There is no distension. Palpations: Abdomen is soft. There is no mass. Tenderness: There is no abdominal tenderness. There is no guarding or rebound. Hernia: No hernia is present. Musculoskeletal:      Right lower leg: No edema. Left lower leg: No edema.       Comments: B/L LE bandaged   Skin:     Capillary Refill: Capillary refill takes less than 2 seconds. Neurological:      Mental Status: He is alert.       Comments: AO X 2 today         Medications:   Medications:    cefepime  2,000 mg IntraVENous Q12H    silver sulfADIAZINE   Topical Daily    insulin glargine  5 Units SubCUTAneous Nightly    [Held by provider] furosemide  80 mg IntraVENous TID    linezolid  600 mg IntraVENous Q12H    famotidine  20 mg Per NG tube Daily    heparin (porcine)  5,000 Units SubCUTAneous 3 times per day    sodium hypochlorite   Irrigation Daily    collagenase   Topical Daily    sodium chloride flush  5-40 mL IntraVENous 2 times per day    aspirin  81 mg Oral Daily    [Held by provider] clopidogrel  75 mg Oral Daily    ipratropium-albuterol  1 ampule Inhalation Q4H WA      Infusions:    dextrose      sodium chloride 10 mL/hr at 02/21/23 0644     PRN Meds: HYDROmorphone, 1 mg, Q4H PRN  albuterol, 2.5 mg, Q2H PRN  heparin flush, 240 Units, PRN  glucose, 4 tablet, PRN  dextrose bolus, 125 mL, PRN   Or  dextrose bolus, 250 mL, PRN  glucagon (rDNA), 1 mg, PRN  dextrose, , Continuous PRN  sodium chloride flush, 10 mL, PRN  sodium chloride, , PRN      Labs      Recent Results (from the past 24 hour(s))   POCT Glucose    Collection Time: 02/21/23  6:18 PM   Result Value Ref Range    POC Glucose 177 (H) 70 - 99 MG/DL   POCT Glucose    Collection Time: 02/21/23  9:22 PM   Result Value Ref Range    POC Glucose 182 (H) 70 - 99 MG/DL   Comprehensive Metabolic Panel    Collection Time: 02/22/23  5:09 AM   Result Value Ref Range    Sodium 136 135 - 145 MMOL/L    Potassium 4.3 3.5 - 5.1 MMOL/L    Chloride 96 (L) 99 - 110 mMol/L    CO2 29 21 - 32 MMOL/L    BUN 63 (H) 6 - 23 MG/DL    Creatinine 3.1 (H) 0.9 - 1.3 MG/DL    Est, Glom Filt Rate 21 (L) >60 mL/min/1.73m2    Glucose 174 (H) 70 - 99 MG/DL    Calcium 8.2 (L) 8.3 - 10.6 MG/DL    Albumin 2.8 (L) 3.4 - 5.0 GM/DL    Total Protein 6.2 (L) 6.4 - 8.2 GM/DL    Total Bilirubin 0.6 0.0 - 1.0 MG/DL    ALT 8 (L) 10 - 40 U/L    AST 15 15 - 37 IU/L    Alkaline Phosphatase 70 40 - 128 IU/L    Anion Gap 11 4 - 16   Magnesium    Collection Time: 02/22/23  5:09 AM   Result Value Ref Range    Magnesium 2.0 1.8 - 2.4 mg/dl   Phosphorus    Collection Time: 02/22/23  5:09 AM   Result Value Ref Range    Phosphorus 3.7 2.5 - 4.9 MG/DL   Brain Natriuretic Peptide    Collection Time: 02/22/23  5:09 AM   Result Value Ref Range    Pro-BNP 17,818 (H) <300 PG/ML   CBC with Auto Differential    Collection Time: 02/22/23  5:09 AM   Result Value Ref Range    WBC 5.0 4.0 - 10.5 K/CU MM    RBC 2.98 (L) 4.6 - 6.2 M/CU MM    Hemoglobin 7.8 (L) 13.5 - 18.0 GM/DL    Hematocrit 26.6 (L) 42 - 52 %    MCV 89.3 78 - 100 FL    MCH 26.2 (L) 27 - 31 PG    MCHC 29.3 (L) 32.0 - 36.0 %    RDW 19.7 (H) 11.7 - 14.9 %    Platelets 196 909 - 318 K/CU MM    MPV 9.6 7.5 - 11.1 FL    Differential Type AUTOMATED DIFFERENTIAL     Segs Relative 64.1 36 - 66 %    Lymphocytes % 21.3 (L) 24 - 44 %    Monocytes % 10.6 (H) 0 - 4 %    Eosinophils % 2.8 0 - 3 %    Basophils % 1.0 0 - 1 %    Segs Absolute 3.2 K/CU MM    Lymphocytes Absolute 1.1 K/CU MM    Monocytes Absolute 0.5 K/CU MM    Eosinophils Absolute 0.1 K/CU MM    Basophils Absolute 0.1 K/CU MM    Nucleated RBC % 0.0 %    Total Nucleated RBC 0.0 K/CU MM    Total Immature Neutrophil 0.01 K/CU MM    Immature Neutrophil % 0.2 0 - 0.43 %   POCT Glucose    Collection Time: 02/22/23  5:20 AM   Result Value Ref Range    POC Glucose 168 (H) 70 - 99 MG/DL        Imaging/Diagnostics Last 24 Hours   XR CHEST PORTABLE    Result Date: 2/11/2023  EXAMINATION: ONE XRAY VIEW OF THE CHEST 2/11/2023 7:29 pm COMPARISON: Chest radiograph 02/11/2023 HISTORY: ORDERING SYSTEM PROVIDED HISTORY: ETT placement TECHNOLOGIST PROVIDED HISTORY: Reason for exam:->ETT placement Reason for Exam: et and og tube placement confirmation Additional signs and symptoms: et and og tube placement confrimation FINDINGS: Lines and tubes: -ETT terminates at the superior margin of the clavicles. -enteric tube takes a the subdiaphragmatic course and terminates out of the field of view -right-sided Cordis catheter, which terminates within the mid/upper SVC Lungs: There is indistinctness of the pulmonary vasculature with patchy opacities within the right greater than left lungs. . Pleura: Small right-sided pleural effusion. Cardiomediastinal silhouette: Enlarged cardiac silhouette. Bones: No acute osseous findings. Soft tissues: Left-sided 2 lead cardiac device. Lines and tubes as above. The ETT terminates at the level of the superior margin of the clavicles. Grossly unchanged pulmonary exam.  Findings favor cardiogenic pulmonary edema. However a superimposed infectious process cannot be excluded.        Electronically signed by Paris Mcqueen MD on 2/22/2023 at 10:35 AM

## 2023-02-22 NOTE — PROGRESS NOTES
Occupational Therapy    Occupational Therapy Treatment Note    Name: Ander Pierson MRN: [id number] :   1950   Date:  2023   Admission Date: 2023 Room:  -A     Primary Problem:  The primary encounter diagnosis was Acute respiratory failure with hypoxia and hypercapnia ([facility] [address] Diagnoses of Acute kidney injury superimposed on chronic kidney disease ([facility] [address] Hyperkalemia, Acute on chronic heart failure, unspecified heart failure type ([facility] Elevated troponin, and Acidosis, metabolic, with respiratory acidosis were also pertinent to this visit. Restrictions/Precautions:  Contact Precautions, General Precautions, Fall Risk, oxygen, parkinson, chest tube    Communication with other providers: RN approved session, Co tx with PT Earl for safety and mobility    Subjective:  Patient states:  Pt agreeable to therapy session. Pain:   Location, Type, Intensity (0/10 to 10/10):  Pt c/o of pain unable to rate generalized. Objective:    Observation: Pt supine in bed upon arrival.  Objective Measures:  Pt alert to self and place and stated 2014 for year despite multiple choice. Treatment, including education:  Therapeutic Activity Training:   Therapeutic activity training was instructed today. Cues were given for safety, sequence, UE/LE placement, awareness, and balance. Activities performed today included bed mobility training, sup-sit. Pt supine in bed prior to arrival and completed sup to sit with head of bed elevated and MAX A x2. Pt noted with decreased ability to utilize bed rails with BUE. Pt seated edge of bed approx 6-8 mimutes with MAX A x1 with noted R lateral lean and posterior lean. Pt completed c/o dizziness sitting edge of bed and vitals assessed and appeared within normal limits. Pt returned supine in bed with MAX A X 2 and boosted in bed with DEP A x2. Pt positioned in bed with pillows.       Self Care Training:   Cues were given for safety, sequence, UE/LE placement, visual cues, and balance. Activities performed today included feeding. Pt seated edge of bed with poor balance with limited ability to maintain trunk control in one plane of motion to progress to sitting edge of bed for seated ADLs. Pt returned supine in bed and required set up MAX A for feeding for what appeared to be cognition and limited grasp on utensils. Pt supine in bed with all needs met and call light in reach. Assessment / Impression:    Patient's tolerance of treatment: fair- to poor  Adverse Reaction: None  Significant change in status and impact:   Barriers to improvement: None noted    Plan for Next Session:    Continue OT POC and progress sitting edge of bed in progress sitting balance targeting simple ADLs.     Time in:  0853  Time out:  0922  Timed treatment minutes:  29  Total treatment time:  29    Electronically signed by:    BETINA Casey,   2/22/2023, 11:26 AM

## 2023-02-22 NOTE — PROGRESS NOTES
Physical Therapy  [facility] ACUTE CARE PHYSICAL THERAPY EVALUATION  Anmol Lee, 1950, 2126/2126-A, 2/22/2023    History  [place]:  The primary encounter diagnosis was Acute respiratory failure with hypoxia and hypercapnia ([facility]). Diagnoses of Acute kidney injury superimposed on chronic kidney disease ([facility]), Hyperkalemia, Acute on chronic heart failure, unspecified heart failure type ([facility]), Elevated troponin, and Acidosis, metabolic, with respiratory acidosis were also pertinent to this visit.  Patient  has a past medical history of Acid reflux, Acute MI ([facility]), Arthritis, Broken teeth, CAD (coronary artery disease), Cardiomyopathy ([facility]), Cerebral artery occlusion with cerebral infarction ([facility]), CHF (congestive heart failure) ([facility]), Chronic back pain, Chronic kidney disease, Diabetes mellitus ([facility]), Diabetic neuropathy ([facility]), H/O cardiovascular stress test, H/O Doppler ultrasound, H/O percutaneous left heart catheterization, History of irregular heartbeat, History of syncope, Hyperlipidemia, Hypertension, Leg swelling, Necrotic toes ([facility]), Neuropathy, PAD (peripheral artery disease) ([facility]), PVD (peripheral vascular disease) ([facility]), Sick sinus syndrome ([facility]), Sleep apnea, Spinal stenosis, Teeth missing, Type 2 diabetes mellitus without complication ([facility]), and WD-Chronic foot ulcer, left, with necrosis of bone ([facility]).  Patient  has a past surgical history that includes Coronary angioplasty with stent; Dental surgery; Colonoscopy (08/04/2016); pacemaker placement (06/04/2010); vascular surgery; colectomy (Right, 08/26/2016); Toe amputation (Right, 09/12/2017); Toe amputation (Right, 01/09/2018); Cardiac catheterization; Cardiac defibrillator placement (06/04/2010); Coronary angioplasty; Cardiac catheterization (07/14/2017); Cardiac catheterization (11/20/2018); Toe amputation (Left, 12/26/2020); IR TUNNELED CVC PLACE WO SQ PORT/PUMP > 5 YEARS (6/14/2021); Toe amputation (Left, 7/26/2022); Toe amputation  (Right, 10/20/2022); and IR TUNNELED CVC PLACE WO SQ PORT/PUMP > 5 YEARS (11/17/2022). Assessment:  Pt presents 11 days s/p admission for respiratory distress, intubated 2/11 to 2/20. Pt is from home w/ family, in 2 level home w/ ramped entry, needs assist for all care at home, completing SqPT w/ assist from family. Pt is primarily limited by strength and balance, additional deficits include pain, posture, endurance, functional mobility, posture, sensation, cognition, safety awareness, ROM. He requires max A x 2, impaired balance impacting all mobility and requiring heavy assist, impaired cognition requiring great inc processing time. Recommending SNF. Complexity: Moderate  Prognosis: Fair +, comorbid conditions, cognition, deconditioning  Plan 3+/week, 1 week  Equipment: pend to next LOC    Recommendations for NURSING mobility: RAMYA for OOB    Subjective:  Patient states:  \"I'm feeling a lot better. \"    Pain:  denies.     Communication with other providers:  Handoff to RN, co-eval/tx with GEENA Prince  Restrictions: fall risk; general precautions; contact    Home Setup/Prior level of function  Social/Functional History  Lives With: Family, Daughter  Type of Home: House  Home Layout: Two level  Home Access: Ramped entrance  Bathroom Shower/Tub: Shower chair without back, Tub/Shower unit  Bathroom Equipment: Shower chair  Bathroom Accessibility: Not accessible  Home Equipment: Noelle Click, Electric scooter, Walker, standard, Hospital bed  [employer] Help From: Family  ADL Assistance: Needs assistance  Bath: Independent  Dressing: Independent  Grooming: Independent  Feeding: Independent  Toileting: Independent  Homemaking Assistance: Needs assistance  Ambulation Assistance: Needs assistance  Transfer Assistance: Needs assistance  Active : No  Mode of Transportation: Car  Occupation: Retired, On disability    Examination of body systems (includes body structures/functions, activity/participation limitations):  Observation:  Supine, awake, lethargic/delayed, agreeable. Poor postural control and dec endurance impacting ability to participate at EOB. Tele, BP cuff, pulse ox, PICC, parkinson, chest tube, bolsters in place upon arrival  Posture: poor ; fwd head/shoulders + dec postural control  Vision:  WFL  Hearing:  WFL  Cardiopulmonary:  WFL  Cognition: A&O x 2 - disoriented to time, situation ; delayed and intermittently confusion impacting command following and attention to cues, distractible, impaired recall of recent events, fair safety awareness, mod cues needed for sequencing/setup, mod cues needed for initiation, cues to bring inc awareness to errors    Musculoskeletal  ROM R/L: PROM WFL ; AROM limited last 25% 2/2 weakness    Strength R/L:  grossly 3-/5   Sensation: partial sensation deficits in BL LE  Tone: normotonic ; guarded in LE pain   Coordination: grossly impaired 2/2 impaired fine motor and weakness  Proprioception: poor ; strength grossly impacting ability to complete active range + any resistance    Mobility:  Supine to sit:  max A x 2 for transition of trunk, pelvis and steady to R and post lean  Transfers: NT ; pt endorses performing SqPT at baseline ; additional safety concerns 2/2 strength and balance deficits  Sitting balance:  poor ; max A to attend to R and posterior lean. Standing balance:  NT ; pt endorses performing SqPT at baseline ; additional safety concerns 2/2 strength and balance deficits. Gait: NT ; pt endorses performing wheelchair mobility at baseline ; additional safety concerns 2/2 strength and balance deficits.     [facility] 6 Clicks Inpatient Mobility:  AM-PAC Inpatient Mobility Raw Score : 12    Goals:  Pt Goals: return to PLOF  Short Term Goals  Time Frame for Short Term Goals: 1 week  Short Term Goal 1: pt will complete bed mobility at mod A  Short Term Goal 2: pt will demonstrate fair - seated balance w/ min posterior support during participation of EOB  Short Term Goal 3: pt will demonstrate 3+/5 BLE strength  Short Term Goal 4: pt will demonstrate 20 mins of seated tolerance for performance at EOB       Treatment plan:  Bed mobility, functional mobility, transfers, balance, gait, TA, TX, strength activities, neuro    Treatment:  Therapeutic Activity Training:   Therapeutic activity training was instructed today. Cues were given for safety, sequence, UE/LE placement, awareness, and balance. Activities performed today included bed mobility training, sup-sit, sit-stand, SPT.     Positioned for comfort and pressure relief  Safety: patient left in bed, call light within reach, RN notified, gait belt used, all needs met    Time:   Time in: 0853  Time out: 0922  Timed treatment minutes: 19  Total time + eval: 29    Electronically signed by:    Deepak Cruz PT, DPT  2/22/2023, 12:04 PM

## 2023-02-22 NOTE — PROGRESS NOTES
V2.0  [facility] Critical Care Progress Note      Name:  Annette Retana /Age/Sex: 1950  (67 y.o. male)   MRN & CSN:  [id number] & [id number] Encounter Date/Time: 2023 4:09 PM EST    Location:  -A PCP: Daniel Andrew Day: 12    Assessment and Plan:   Annette Retana is a 67 y.o. male who presents with Acute respiratory failure with hypoxia and hypercapnia (HCC)    Acute on chronic renal failure requiring intermittent dialysis support  Acute respiratory failure with hypoxia and hypercapnia, requiring intubation, extubated on  with immediate re-intubation, s/p extubation   In patient cardio respiratory arrest on   Acute metabolic encephalopathy  Large right-sided pleural effusion s/p chest tube placement on   Hyperkalemia-resolved  Acute metabolic encephalopathy  Chronic LE wounds- improving  HTN  CAD  Heart failure with preserved ejection fraction  Large right pleural effusion     Plan:     Neuro -no focal neurological deficits verbalizing, following commands    CV - acute on chronic HF. EF 50-55% in . Resp - s/p extubation , tolerating well on 4 L nasal cannula, cough and sputum production noted, continue DuoNebs and respiratory treatments, right sided chest tube. Chest x-ray for today pending, will evaluate the chest tube can be removed at that time. GI -speech evaluation, dysphagia diet ordered. GI prophylaxis: Pepcid      - NANCY on CKD 3/4. Baseline Cr 2.2. adm Cr 4.8. sCr now 3.1; CRRT has been stopped. Nephrology following. De-escalating diuretics. Continue to monitor need for dialysis. Urology consulted for difficult parkinson; Distal urethral stricture with incomplete bladder emptying-cystoscopy/TRUS as outpatient if voiding issues persist, voiding trial prior to discharge     ID -Bilateral lower extremity wounds; wound cultures on -positive for Alcaligenes faecalis, Staph aureus MRSA, Finegoldia magna.   Antibiotics changed to cefepime and Zyvox continue. White blood count stable   Wound culture positive for Pseudomonas aeruginosa, Citrobacter Freundii, MRSA, Enterococcus faecalis. Plan to switch to p.o. Zyvox and Levaquin for 7 days. PO. Flagyl ordered, to be given for 14 days duration. Heme -hemoglobin-stable. Between 7- 8 mg/dL     MSK - LE wounds bilaterally. Wound care consult. Wet-dry for now with xeroform. Antibiotics as above, general surgery following      Patient is stable for transfer to the stepdown unit. Transfer orders placed. Critical care team will continue to follow patient while he is physically in the ICU. We will sign off once he is transferred. Diet ADULT ORAL NUTRITION SUPPLEMENT; Lunch, Dinner; Diabetic Oral Supplement  ADULT DIET; Dysphagia - Minced and Moist; Mildly Thick (Nectar)   DVT Prophylaxis [] Lovenox, [x]  Heparin, [] SCDs, [] Ambulation,  [] Eliquis, [] Xarelto  [] Coumadin, GI PPX- Pepcid   Code Status Full Code   Disposition From: Home  Expected Disposition: TBD  Estimated Date of Discharge: TBD  Patient requires continued admission due to respiratory failure   Surrogate Decision Maker/ POA Children      Subjective:   Patient seen and examined this AM.  Labs and vitals reviewed. No acute events overnight. He is doing well since being extubated, on 4 L nasal cannula. He denies any pain at this time. He denies shortness of breath or chest pain. He reports he is feeling better, and thankful. He denies any abdominal pain or nausea. He denies any new weaknesses. Plan of care discussed with bedside RN  Review of Systems:    Review of Systems  As above, a 10 point review of systems was completed. All negative except what is mentioned above. Objective:      Intake/Output Summary (Last 24 hours) at 2/22/2023 1416  Last data filed at 2/22/2023 0800  Gross per 24 hour   Intake 925 ml   Output 2060 ml   Net -1135 ml          Vitals:   Vitals:    02/22/23 1300   BP: (!) 129/54   Pulse: 71   Resp: 16   Temp:    SpO2: 100%       Physical Exam:     General: Patient is a 79-year-old male, NAD  Eyes: EOMI  ENT: neck supple,   Cardiovascular: Regular rate. Respiratory:bilateral rhonchi, no wheezing. Gastrointestinal: Obese, soft, non tender  Genitourinary: no suprapubic tenderness, Fuller catheter in place.   Musculoskeletal: 2+ lower extremity edema, chronic wounds on bilateral lower extremities- dressing clean/dry/intact  Skin: warm, dry  Neuro: Alert, no focal neurological deficits  psych: Pleasant    Medications:   Medications:    levoFLOXacin  750 mg Oral Every Other Day    metroNIDAZOLE  500 mg Oral 3 times per day    linezolid  600 mg Oral 2 times per day    silver sulfADIAZINE   Topical Daily    insulin glargine  5 Units SubCUTAneous Nightly    [Held by provider] furosemide  80 mg IntraVENous TID    famotidine  20 mg Per NG tube Daily    heparin (porcine)  5,000 Units SubCUTAneous 3 times per day    sodium hypochlorite   Irrigation Daily    collagenase   Topical Daily    sodium chloride flush  5-40 mL IntraVENous 2 times per day    aspirin  81 mg Oral Daily    [Held by provider] clopidogrel  75 mg Oral Daily    ipratropium-albuterol  1 ampule Inhalation Q4H WA      Infusions:    dextrose      sodium chloride 10 mL/hr at 02/21/23 0644     PRN Meds: HYDROmorphone, 1 mg, Q4H PRN  albuterol, 2.5 mg, Q2H PRN  heparin flush, 240 Units, PRN  glucose, 4 tablet, PRN  dextrose bolus, 125 mL, PRN   Or  dextrose bolus, 250 mL, PRN  glucagon (rDNA), 1 mg, PRN  dextrose, , Continuous PRN  sodium chloride flush, 10 mL, PRN  sodium chloride, , PRN      Labs      Recent Results (from the past 24 hour(s))   POCT Glucose    Collection Time: 02/21/23  6:18 PM   Result Value Ref Range    POC Glucose 177 (H) 70 - 99 MG/DL   POCT Glucose    Collection Time: 02/21/23  9:22 PM   Result Value Ref Range    POC Glucose 182 (H) 70 - 99 MG/DL   Comprehensive Metabolic Panel    Collection Time: 02/22/23  5:09 AM   Result Value Ref Range    Sodium 136 135 - 145 MMOL/L    Potassium 4.3 3.5 - 5.1 MMOL/L    Chloride 96 (L) 99 - 110 mMol/L    CO2 29 21 - 32 MMOL/L    BUN 63 (H) 6 - 23 MG/DL    Creatinine 3.1 (H) 0.9 - 1.3 MG/DL    Est, Glom Filt Rate 21 (L) >60 mL/min/1.73m2    Glucose 174 (H) 70 - 99 MG/DL    Calcium 8.2 (L) 8.3 - 10.6 MG/DL    Albumin 2.8 (L) 3.4 - 5.0 GM/DL    Total Protein 6.2 (L) 6.4 - 8.2 GM/DL    Total Bilirubin 0.6 0.0 - 1.0 MG/DL    ALT 8 (L) 10 - 40 U/L    AST 15 15 - 37 IU/L    Alkaline Phosphatase 70 40 - 128 IU/L    Anion Gap 11 4 - 16   Magnesium    Collection Time: 02/22/23  5:09 AM   Result Value Ref Range    Magnesium 2.0 1.8 - 2.4 mg/dl   Phosphorus    Collection Time: 02/22/23  5:09 AM   Result Value Ref Range    Phosphorus 3.7 2.5 - 4.9 MG/DL   Brain Natriuretic Peptide    Collection Time: 02/22/23  5:09 AM   Result Value Ref Range    Pro-BNP 17,818 (H) <300 PG/ML   CBC with Auto Differential    Collection Time: 02/22/23  5:09 AM   Result Value Ref Range    WBC 5.0 4.0 - 10.5 K/CU MM    RBC 2.98 (L) 4.6 - 6.2 M/CU MM    Hemoglobin 7.8 (L) 13.5 - 18.0 GM/DL    Hematocrit 26.6 (L) 42 - 52 %    MCV 89.3 78 - 100 FL    MCH 26.2 (L) 27 - 31 PG    MCHC 29.3 (L) 32.0 - 36.0 %    RDW 19.7 (H) 11.7 - 14.9 %    Platelets 712 590 - 837 K/CU MM    MPV 9.6 7.5 - 11.1 FL    Differential Type AUTOMATED DIFFERENTIAL     Segs Relative 64.1 36 - 66 %    Lymphocytes % 21.3 (L) 24 - 44 %    Monocytes % 10.6 (H) 0 - 4 %    Eosinophils % 2.8 0 - 3 %    Basophils % 1.0 0 - 1 %    Segs Absolute 3.2 K/CU MM    Lymphocytes Absolute 1.1 K/CU MM    Monocytes Absolute 0.5 K/CU MM    Eosinophils Absolute 0.1 K/CU MM    Basophils Absolute 0.1 K/CU MM    Nucleated RBC % 0.0 %    Total Nucleated RBC 0.0 K/CU MM    Total Immature Neutrophil 0.01 K/CU MM    Immature Neutrophil % 0.2 0 - 0.43 %   POCT Glucose    Collection Time: 02/22/23  5:20 AM   Result Value Ref Range    POC Glucose 168 (H) 70 - 99 MG/DL        Imaging/Diagnostics Last 24 Hours   XR CHEST PORTABLE    Result Date: 2/11/2023  EXAMINATION: ONE XRAY VIEW OF THE CHEST 2/11/2023 7:29 pm COMPARISON: Chest radiograph 02/11/2023 HISTORY: ORDERING SYSTEM PROVIDED HISTORY: ETT placement TECHNOLOGIST PROVIDED HISTORY: Reason for exam:->ETT placement Reason for Exam: et and og tube placement confirmation Additional signs and symptoms: et and og tube placement confrimation FINDINGS: Lines and tubes: -ETT terminates at the superior margin of the clavicles. -enteric tube takes a the subdiaphragmatic course and terminates out of the field of view -right-sided Cordis catheter, which terminates within the mid/upper SVC Lungs: There is indistinctness of the pulmonary vasculature with patchy opacities within the right greater than left lungs. . Pleura: Small right-sided pleural effusion. Cardiomediastinal silhouette: Enlarged cardiac silhouette. Bones: No acute osseous findings. Soft tissues: Left-sided 2 lead cardiac device. Lines and tubes as above. The ETT terminates at the level of the superior margin of the clavicles. Grossly unchanged pulmonary exam.  Findings favor cardiogenic pulmonary edema. However a superimposed infectious process cannot be excluded.          Electronically signed by MICKY Ortega CNP on 2/22/2023 at 2:16 PM

## 2023-02-22 NOTE — CARE COORDINATION
CM reviewed notes. PT is to see this AM. Pt is on O2 at 2lmin. Per previous notes pts dtr would like to return home. CM PS to [facility] to see if pt is active with [address] [place] [address] Rd. CM team is following. [address]  [address] CM spoke with pts son and dtr. CM reviewed options for discharge. SNF, or home with home care. Both are in agreement for pt to return home with his dtr and [address] [place] [address] Rd to follow.  [address]

## 2023-02-22 NOTE — PROGRESS NOTES
Nephrology Progress Note  2/22/2023 9:19 AM        Subjective:   Admit Date: 2/11/2023  PCP: Jonathon Mei    Interval History: He was alert awake but not 100% oriented yet    Diet: Reported eating some    ROS: Mentation not 100% clear yet, he did recognize me  Urine output about 2 L for the last 24 hours  No fever and acceptable blood pressure    Data:     Current meds:    cefepime  2,000 mg IntraVENous Q12H    silver sulfADIAZINE   Topical Daily    insulin glargine  5 Units SubCUTAneous Nightly    furosemide  80 mg IntraVENous TID    linezolid  600 mg IntraVENous Q12H    famotidine  20 mg Per NG tube Daily    heparin (porcine)  5,000 Units SubCUTAneous 3 times per day    sodium hypochlorite   Irrigation Daily    collagenase   Topical Daily    sodium chloride flush  5-40 mL IntraVENous 2 times per day    aspirin  81 mg Oral Daily    [Held by provider] clopidogrel  75 mg Oral Daily    ipratropium-albuterol  1 ampule Inhalation Q4H WA      dextrose      sodium chloride 10 mL/hr at 02/21/23 0644         I/O last 3 completed shifts: In: 1893.4 [P.O.:125;  I.V.:986.1; IV Piggyback:782.3]  Out: 3705 [Urine:3325; Chest Tube:380]    CBC:   Recent Labs     02/22/23  0509   WBC 5.0   HGB 7.8*             Recent Labs     02/20/23  0530 02/21/23  0400 02/22/23  0509   * 135 136   K 4.1 4.1 4.3   CL 95* 97* 96*   CO2 29 28 29   BUN 54* 60* 63*   CREATININE 2.9* 2.9* 3.1*   GLUCOSE 270* 147* 174*       Lab Results   Component Value Date    CALCIUM 8.2 (L) 02/22/2023    PHOS 3.7 02/22/2023       Objective:     Vitals: /65   Pulse 77   Temp 98.2 °F (36.8 °C) (Oral)   Resp 13   Ht 6' (1.829 m)   Wt 279 lb 1.6 oz (126.6 kg)   SpO2 91%   BMI 37.85 kg/m² ,    General appearance: Alert, awake perhaps slowly improving mentation his speech is still little slurry  HEENT: Positive conjunctival pallor  Neck: Right IJ temporary dialysis catheter  Lungs: Positive adventitious breath sound-left pleural space chest tube  Heart: Seems regular rate and rhythm, left distal implanted cardiac device  Abdomen: Soft  Extremities: Bilateral leg edema, he has Fluler catheter      Problem List :         Impression :     Stage III acute kidney disease with underlying CKD stage IV A3-acceptable urine output with diuretics-but BUN/creatinine wrong direction now= perhaps from delayed capillary plasma refill  Recent shock syndrome, infectious process are suspected, remains extubated  Aftermath of acute illness, respiratory failure and acute kidney injury-underlying atherosclerotic cardiovascular disease and 

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
## 2023-02-22 NOTE — PROGRESS NOTES
This tech was feeding patient, patient had a very difficult time swallowing the piece of pancake I fed them. Patient ate 2 bites of eggs and still had a little trouble swallowing.

## 2023-02-22 NOTE — DISCHARGE INSTR - COC
Continuity of Care Form    Patient Name: Yovana Coleman   :  1950  MRN:  [id number]    Admit date:  2023  Discharge date:  ***    Code Status Order: Full Code   Advance Directives:     Admitting Physician:  Stella Franklin MD  PCP: Patricia Adkins    Discharging Nurse: [facility] Unit/Room#: 2126/2126-A  Discharging Unit Phone Number: ***    Emergency Contact:   Extended Emergency Contact Information  Primary Emergency Contact: Jaz Lee  Address:  . [address] Phone: [phone]  Relation: Child  Secondary Emergency Contact: Odalys Lee  Mobile Phone: [phone]  Relation: Grandchild  Preferred language: English   needed?  No    Past Surgical History:  Past Surgical History:   Procedure Laterality Date    CARDIAC CATHETERIZATION      per old chart done 10/2014    CARDIAC CATHETERIZATION  2017    with angiography of leg    CARDIAC CATHETERIZATION  2018    PATENT STENTS OF ALL THREE MAJOR VESSELS    CARDIAC DEFIBRILLATOR PLACEMENT  2010    [employer]onic [address] DR Defibrillator Implanted    COLECTOMY Right 2016    laparascopic; robotic assisted    COLONOSCOPY  2016    CORONARY ANGIOPLASTY      \"15 Heart Stents\"    CORONARY ANGIOPLASTY WITH STENT PLACEMENT      per old chart had angio with stent to circumflex and obtuse marginal artery at [facility] 2010( old chart also gives hx of stent placement done , and )    DENTAL SURGERY      Teeth Extracted In Past    IR TUNNELED CATHETER PLACEMENT GREATER THAN 5 YEARS  2021    IR TUNNELED CATHETER PLACEMENT GREATER THAN 5 YEARS 2021 [facility]    IR TUNNELED CATHETER PLACEMENT GREATER THAN 5 YEARS  2022    IR TUNNELED CATHETER PLACEMENT GREATER THAN 5 YEARS 2022 [facility]    PACEMAKER PLACEMENT  2010    Medtronic Secura DR Defibrillator Implanted    TOE AMPUTATION Right 09/12/2017    Rt 3rd toe    TOE AMPUTATION Right 01/09/2018     Right 5th toe amputation and Toenails trimmed left 2,3,4 and 5th toes    TOE AMPUTATION Left 12/26/2020    LEFT GREAT TOE AMPUTATION performed by Bernardo Fletcher MD at [address] Left 7/26/2022    LEFT SECOND TOE AMPUTATION performed by Amaris Salgado MD at [address] Right 10/20/2022    Right First TOE AMPUTATION performed by Amaris Salgado MD at [address] [place] [address]      per old chart had balloon angioplasty right superfical femoral artery,right popliteal artery,,right ant.tibial artery, right tibioperoneal trunk, and right post.tibial artery wna stent placement right popliteal artery and superfical femoral artery 7/2012       Immunization History:   Immunization History   Administered Date(s) Administered    COVID-19, PFIZER PURPLE top, DILUTE for use, (age 15 y+), 30mcg/0.3mL 02/24/2021, 03/27/2021    Influenza Vaccine, unspecified formulation 01/04/2016, 09/12/2017    Influenza Virus Vaccine 10/01/2010, 10/15/2012, 09/12/2017, 10/25/2018    Influenza Whole 01/04/2016    Influenza, FLUARIX, FLULAVAL, Sarah Faustino (age 10 mo+) AND AFLURIA, (age 1 y+), PF, 0.5mL 12/13/2020    Influenza, High Dose (Fluzone 65 yrs and older) 09/26/2018    Pneumococcal Conjugate 13-valent ([id number]) 05/31/2016    Pneumococcal Polysaccharide ([id number]) 10/01/2010, 07/10/2017       Active Problems:  Patient Active Problem List   Diagnosis Code    PAD (peripheral artery disease) ([facility]) I73.9    Chronic coronary artery disease I25.10    Biventricular ICD (implantable cardioverter-defibrillator) in place Z95.810    Chronic combined systolic and diastolic heart failure ([facility]) I50.42    Chronic kidney disease, stage III (moderate) ([facility]) N18.30    Mixed hyperlipidemia E78.2    Sick sinus syndrome ([facility]) I49.5    Type 2 diabetes mellitus with diabetic polyneuropathy ([address] E11.42    Spinal stenosis of lumbar region M48.061    Obesity, Class I, BMI 30-34.9 E66.9    S/P partial colectomy Z90.49    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    Microalbuminuria R80.9    WD-PVD (peripheral vascular disease) ([facility]) I73.9    Limb ischemia I99.8    Necrotic toes ([facility]) I96    Toe gangrene ([facility]) I96    Diabetic foot infection ([facility]) E11.628, L08.9    Chronic kidney disease (CKD) stage G3a/A2, moderately decreased glomerular filtration rate (GFR) between 45-59 mL/min/1.73 square meter and albuminuria creatinine ratio between  mg/g ([facility]) N18.31    Edema R60.9    ICD (implantable cardioverter-defibrillator) battery depletion Z45.02    Hyperkalemia E87.5    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[facility]) I96    Ischemia of toe I99.8    Acute kidney injury ([facility] N17.9    Fluid overload E87.70    DM (diabetes mellitus) ([facility]) E11.9    Precordial pain R07.2    Acute chest pain R07.9    Unstable angina ([facility]) I20.0    Chronic kidney disease (CKD) stage G3a/A3, moderately decreased glomerular filtration rate (GFR) between 45-59 mL/min/1.73 square meter and albuminuria creatinine ratio greater than 300 mg/g ([facility]) N18.31    Cardiomyopathy ([facility]) I42.9    Diabetic neuropathy ([facility]) E11.40    HTN (hypertension) I10    Epigastric pain R10.13    Acute on chronic congestive heart failure ([facility]) I50.9    Leg edema R60.0    Acute on chronic systolic CHF (congestive heart failure) ([facility]) I50.23    Diabetic foot ulcer with osteomyelitis ([facility]) E11.621, E11.69, L97.509, M86.9    Visit for wound check Z51.89    Moderate malnutrition ([facility]) E44.0    Long term (current) use of antibiotics Z79.2    WD-Diabetic ulcer of toe of right foot associated with type 2 diabetes mellitus, with fat layer exposed ([address] E11.621, A12.494    Diabetic ulcer of toe associated with type 2 diabetes mellitus, with bone involvement without evidence of necrosis ([address] E11.621, L97.506    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 B87.9    Persistent wound pain R52    Receiving intravenous antibiotic treatment as outpatient Z79.2    Acute on chronic respiratory failure with hypoxemia ([facility]) J96.21    WD-Chronic foot ulcer, left, with necrosis of bone ([facility]) L97.524    Acute on chronic HFrEF (heart failure with reduced ejection fraction) ([facility]) I50.23    Scrotal edema N50.89    Hypertensive urgency I16.0    Diabetic ulcer of right foot due to type 2 diabetes mellitus ([address] E11.621, L97.519    Cellulitis of foot L03.119    Toe osteomyelitis ([address] M86.9    Heart failure exacerbated by sotalol ([address] I50.9    CHF (congestive heart failure), NYHA class I, acute on chronic, combined ([address] I50.43    Acute on chronic diastolic CHF (congestive heart failure) ([facility]) I50.33    Leg swelling M79.89    Acute on chronic diastolic heart failure ([facility]) I50.33    Skin ulcer of left foot including toes with fat layer exposed ([address] L97.522    Superficial incisional surgical site infection T81.41XA    Bacteremia due to Enterococcus R78.81, B95.2    Acute respiratory failure with hypoxia and hypercapnia ([facility]) J96.01, J96.02    Acute kidney injury superimposed on CKD ([address] N17.9, N18.9    Diabetic foot ([address] E11.8    Diabetic ulcer of midfoot associated with diabetes mellitus due to underlying condition, with muscle involvement without evidence of necrosis ([address] Q71.506, L97.405    Other seizures ([facility]) G40.89       Isolation/Infection:   Isolation            Contact          Patient Infection Status       Infection Onset Added Last Indicated Last Indicated By Review Planned Expiration Resolved Resolved By    MRSA 06/08/21 06/10/21 02/14/23 Culture, Wound Aerobic Only        Resolved    COVID-19 (Rule Out) 23 COVID-19, Rapid (Ordered)   23 Rule-Out Test Resulted    COVID-19 (Rule Out) 22 COVID-19, Rapid (Ordered)   22 Rule-Out Test Resulted    COVID-19 (Rule Out) 22 COVID-19 (Ordered)   22     COVID-19 (Rule Out) 10/29/21 10/29/21 10/29/21 Respiratory Panel, Molecular, with COVID-19 (Restricted: peds pts or suitable admitted adults) (Ordered)   10/29/21 Rule-Out Test Resulted    COVID-19 (Rule Out) 10/28/21 10/28/21 10/28/21 COVID-19, Rapid (Ordered)   10/28/21 Rule-Out Test Resulted    COVID-19 (Rule Out) 08/26/21 08/26/21 08/26/21 COVID-19 (Ordered)   08/26/21 Rule-Out Test Resulted    COVID-19 (Rule Out) 12/21/20 12/21/20 12/21/20 COVID-19 (Ordered)   12/21/20 Rule-Out Test Resulted    C-diff Rule Out 10/23/20 10/23/20 10/23/20 C difficile Molecular/PCR (Ordered)   12/21/20 Bi Villanueva LPN            Nurse Assessment:  Last Vital Signs: /65   Pulse 77   Temp 98.2 °F (36.8 °C) (Oral)   Resp 13   Ht 6' (1.829 m)   Wt 279 lb 1.6 oz (126.6 kg)   SpO2 91%   BMI 37.85 kg/m²     Last documented pain score (0-10 scale): Pain Level: 3  Last Weight:   Wt Readings from Last 1 Encounters:   02/21/23 279 lb 1.6 oz (126.6 kg)     Mental Status:  {IP PT MENTAL STATUS:42858}    IV Access:  { PRIYA IV ACCESS:643281213}    Nursing Mobility/ADLs:  Walking   {CHP DME RFYX:604535177}  Transfer  {CHP DME FLRU:632938850}  Bathing  {CHP DME FILP:911110537}  Dressing  {CHP DME TSSL:933612743}  Toileting  {CHP DME ATPF:658739590}  Feeding  {CHP DME JYFH:288612893}  Med Admin  {P DME PCFY:753712687}  Med Delivery   { PRIYA MED Delivery:853062835}    Wound Care Documentation and Therapy:  Wound 07/06/17 Other (Comment) Toe (Comment  which one) (Active)   Number of days: 2056       Wound 12/10/20 Foot Left;Lateral fluid filled blister (Active)   Number of days: 803       Wound 10/30/21   #1 Left Dorsal Great Toe amp site  (Active)   Number of days: 479       Wound 10/30/21   #2 Left Dorsal Second Toe  (Active)   Number of days: 479       Wound 10/30/21 #3 Right Great Toe (Active)   Number of days: 479       Wound 02/25/22 #7 Right Dorsal 2nd Toe (Active)   Number of days: 361       Wound 11/11/22 Ankle Right;Medial (Active)   Number of days: 102       Wound 11/11/22 Toe (Comment  which one) Anterior;Right great toe amputation site (Active)   Number of days: 102       Wound 02/09/23 #1 Right Knee (Active)   Wound Image   02/17/23 1050   Wound Etiology Venous 02/20/23 2010   Dressing Status Clean;Dry; Intact 02/21/23 2000   Wound Cleansed Cleansed with saline 02/20/23 1600   Dressing/Treatment Silicone border 45/82/52 2000   Offloading for Diabetic Foot Ulcers Offloading not required 02/20/23 1600   Dressing Change Due 02/19/23 02/18/23 1200   Wound Length (cm) 0.5 cm 02/17/23 1050   Wound Width (cm) 0.7 cm 02/17/23 1050   Wound Depth (cm) 0.1 cm 02/17/23 1050   Wound Surface Area (cm^2) 0.35 cm^2 02/17/23 1050   Change in Wound Size % (l*w) 76.67 02/17/23 1050   Wound Volume (cm^3) 0.035 cm^3 02/17/23 1050   Wound Healing % 77 02/17/23 1050   Post-Procedure Length (cm) 1 cm 02/09/23 1214   Post-Procedure Width (cm) 1.5 cm 02/09/23 1214   Post-Procedure Depth (cm) 0.1 cm 02/09/23 1214   Post-Procedure Surface Area (cm^2) 1.5 cm^2 02/09/23 1214   Post-Procedure Volume (cm^3) 0.15 cm^3 02/09/23 1214   Distance Tunneling (cm) 0 cm 02/17/23 1607   Tunneling Position ___ O'Clock 0 02/17/23 1607   Undermining Starts ___ O'Clock 0 02/17/23 1607   Undermining Ends___ O'Clock 0 02/17/23 1607   Undermining Maxium Distance (cm) 0 02/17/23 1607   Wound Assessment Eschar dry;Pink/red 02/20/23 2010   Drainage Amount None 02/21/23 2000   Drainage Description Serosanguinous 02/18/23 0400   Odor None 02/20/23 2010   Dina-wound Assessment Dry/flaky 02/20/23 2010   Margins Defined edges 02/20/23 1600   Wound Thickness Description not for Pressure Injury Full thickness 02/20/23 1600   Number of days: 12       Wound 02/09/23 #2 Right Medial Lower Leg Cluster (Active)   Wound Image   02/17/23 1050   Wound Etiology Venous 02/20/23 2010   Dressing Status Clean;Dry; Intact 02/21/23 2000   Wound Cleansed Cleansed with saline 02/21/23 1516   Dressing/Treatment ABD;Roll gauze;Tape/Soft cloth adhesive tape 02/21/23 2000   Offloading for Diabetic Foot Ulcers Other (comment) 02/20/23 1600   Dressing Change Due 02/20/23 02/20/23 1600   Wound Length (cm) 12.5 cm 02/17/23 1050   Wound Width (cm) 7.3 cm 02/17/23 1050   Wound Depth (cm) 0.1 cm 02/17/23 1050   Wound Surface Area (cm^2) 91.25 cm^2 02/17/23 1050   Change in Wound Size % (l*w) 63.5 02/17/23 1050   Wound Volume (cm^3) 9.125 cm^3 02/17/23 1050   Wound Healing % 64 02/17/23 1050   Post-Procedure Length (cm) 20 cm 02/09/23 1214   Post-Procedure Width (cm) 12.5 cm 02/09/23 1214   Post-Procedure Depth (cm) 0.1 cm 02/09/23 1214   Post-Procedure Surface Area (cm^2) 250 cm^2 02/09/23 1214   Post-Procedure Volume (cm^3) 25 cm^3 02/09/23 1214   Distance Tunneling (cm) 0 cm 02/17/23 1607   Tunneling Position ___ O'Clock 0 02/17/23 1607   Undermining Starts ___ O'Clock 0 02/17/23 1607   Undermining Ends___ O'Clock 0 02/17/23 1607   Undermining Maxium Distance (cm) 0 02/17/23 1607   Wound Assessment Pink/red 02/20/23 2010   Drainage Amount None 02/21/23 2000   Drainage Description Serosanguinous 02/20/23 2010   Odor None 02/20/23 2010   Dina-wound Assessment Dry/flaky 02/20/23 2010   Margins Undefined edges 02/20/23 1600   Wound Thickness Description not for Pressure Injury Full thickness 02/20/23 2010   Number of days: 12       Wound 02/09/23 #3 Right Great toe amp site (Active)   Wound Image   02/17/23 1050   Wound Etiology Surgical 02/20/23 2010   Dressing Status Clean;Dry; Intact 02/21/23 2000   Wound Cleansed Cleansed with saline 02/21/23 1516   Dressing/Treatment Gauze dressing/dressing sponge;ABD;Roll gauze;Tape/Soft cloth adhesive tape 02/21/23 2000   Offloading for Diabetic Foot Ulcers Other (comment) 02/18/23 1845   Dressing Change Due 02/19/23 02/18/23 1845   Wound Length (cm) 4.7 cm 02/17/23 1050   Wound Width (cm) 3 cm 02/17/23 1050   Wound Depth (cm) 2.5 cm 02/17/23 1050   Wound Surface Area (cm^2) 14.1 cm^2 02/17/23 1050   Change in Wound Size % (l*w) 29.5 02/17/23 1050   Wound Volume (cm^3) 35.25 cm^3 02/17/23 1050   Wound Healing % 35 02/17/23 1050   Post-Procedure Length (cm) 4 cm 02/09/23 1214   Post-Procedure Width (cm) 5 cm 02/09/23 1214   Post-Procedure Depth (cm) 2.7 cm 02/09/23 1214   Post-Procedure Surface Area (cm^2) 20 cm^2 02/09/23 1214   Post-Procedure Volume (cm^3) 54 cm^3 02/09/23 1214   Distance Tunneling (cm) 0 cm 02/17/23 1607   Tunneling Position ___ O'Clock 0 02/17/23 1607   Undermining Starts ___ O'Clock 0 02/17/23 1607   Undermining Ends___ O'Clock 0 02/17/23 1607   Undermining Maxium Distance (cm) 0 02/17/23 1607   Wound Assessment Pink/red;Slough 02/20/23 2010   Drainage Amount None 02/21/23 2000   Drainage Description Purulent 02/20/23 2010   Odor Malodorous/putrid 02/20/23 2010   Dina-wound Assessment Maceration 02/20/23 2010   Margins Defined edges 02/20/23 2010   Wound Thickness Description not for Pressure Injury Full thickness 02/20/23 2010   Number of days: 12       Wound 02/09/23 #4 Right 4th toe (Active)   Wound Image   02/09/23 1126   Wound Etiology Diabetic 02/20/23 2010   Dressing Status Clean;Dry; Intact 02/21/23 2000   Wound Cleansed Cleansed with saline 02/21/23 1516   Dressing/Treatment Gauze dressing/dressing sponge;Roll gauze;Tape/Soft cloth adhesive tape 02/21/23 2000   Offloading for Diabetic Foot Ulcers Other (comment) 02/20/23 1600   Dressing Change Due 02/20/23 02/20/23 1200   Wound Length (cm) 0 cm 02/17/23 1050   Wound Width (cm) 0 cm 02/17/23 1050   Wound Depth (cm) 0 cm 02/17/23 1050   Wound Surface Area (cm^2) 0 cm^2 02/17/23 1050   Change in Wound Size % (l*w) 100 02/17/23 1050   Wound Volume (cm^3) 0 cm^3 02/17/23 1050   Wound Healing % 100 02/17/23 1050   Post-Procedure Length (cm) 0.5 cm 02/09/23 1214   Post-Procedure Width (cm) 0.8 cm 02/09/23 1214   Post-Procedure Depth (cm) 0.1 cm 02/09/23 1214   Post-Procedure Surface Area (cm^2) 0.4 cm^2 02/09/23 1214   Post-Procedure Volume (cm^3) 0.04 cm^3 02/09/23 1214   Distance Tunneling (cm) 0 cm 02/17/23 1607   Tunneling Position ___ O'Clock 0 02/17/23 1607   Undermining Starts ___ O'Clock 0 02/17/23 1607   Undermining Ends___ O'Clock 0 02/17/23 1607   Undermining Maxium Distance (cm) 0 02/17/23 1607   Wound Assessment Dry 02/20/23 1600   Drainage Amount None 02/21/23 2000   Drainage Description Yellow;Green 02/20/23 1600   Odor Malodorous/putrid 02/20/23 1600   Dina-wound Assessment Maceration 02/20/23 1600   Margins Defined edges 02/20/23 1600   Wound Thickness Description not for Pressure Injury Full thickness 02/18/23 1845   Number of days: 12       Wound 02/09/23 #5 Left lateral lower leg cluster (Active)   Wound Image   02/17/23 1050   Wound Etiology Venous 02/20/23 2010   Dressing Status Clean;Dry; Intact 02/21/23 2000   Wound Cleansed Wound cleanser 02/20/23 1600   Dressing/Treatment Pharmaceutical agent (see MAR); Moist to dry;ABD;Roll gauze 02/21/23 2000   Offloading for Diabetic Foot Ulcers Other (comment) 02/20/23 1200   Dressing Change Due 02/20/23 02/20/23 1200   Wound Length (cm) 2 cm 02/17/23 1050   Wound Width (cm) 5 cm 02/17/23 1050   Wound Depth (cm) 0.1 cm 02/17/23 1050   Wound Surface Area (cm^2) 10 cm^2 02/17/23 1050   Change in Wound Size % (l*w) 58.33 02/17/23 1050   Wound Volume (cm^3) 1 cm^3 02/17/23 1050   Wound Healing % 58 02/17/23 1050   Post-Procedure Length (cm) 4 cm 02/09/23 1214   Post-Procedure Width (cm) 6 cm 02/09/23 1214   Post-Procedure Depth (cm) 0.1 cm 02/09/23 1214   Post-Procedure Surface Area (cm^2) 24 cm^2 02/09/23 1214   Post-Procedure Volume (cm^3) 2.4 cm^3 02/09/23 1214   Distance Tunneling (cm) 0 cm 02/17/23 1607   Tunneling Position ___ O'Clock 0 02/17/23 1607   Undermining Starts ___ O'Clock 0 02/17/23 1607   Undermining Ends___ O'Clock 0 02/17/23 1607   Undermining Maxium Distance (cm) 0 02/17/23 1607   Wound Assessment Pink/red 02/20/23 1600   Drainage Amount None 02/21/23 2000   Drainage Description Serosanguinous 02/20/23 1600   Odor Malodorous/putrid 02/20/23 1600   Dina-wound Assessment Dry/flaky 02/20/23 1600   Margins Undefined edges 02/20/23 1600   Wound Thickness Description not for Pressure Injury Full thickness 02/20/23 1600   Number of days: 12       Wound 02/09/23 #6 Left great toe amp site (Active)   Wound Image   02/17/23 1050   Wound Etiology Surgical 02/20/23 2010   Dressing Status Clean;Dry; Intact 02/21/23 2000   Wound Cleansed Wound cleanser 02/20/23 2010   Dressing/Treatment ABD;Gauze dressing/dressing sponge; Pharmaceutical agent (see MAR); Tape/Soft cloth adhesive tape;Roll gauze 02/21/23 2000   Offloading for Diabetic Foot Ulcers Other (comment) 02/20/23 1600   Dressing Change Due 02/19/23 02/18/23 1845   Wound Length (cm) 1.3 cm 02/17/23 1050   Wound Width (cm) 2.3 cm 02/17/23 1050   Wound Depth (cm) 0.4 cm 02/17/23 1050   Wound Surface Area (cm^2) 2.99 cm^2 02/17/23 1050   Change in Wound Size % (l*w) 50.17 02/17/23 1050   Wound Volume (cm^3) 1.196 cm^3 02/17/23 1050   Wound Healing % 75 02/17/23 1050   Post-Procedure Length (cm) 2 cm 02/09/23 1214   Post-Procedure Width (cm) 3 cm 02/09/23 1214   Post-Procedure Depth (cm) 0.8 cm 02/09/23 1214   Post-Procedure Surface Area (cm^2) 6 cm^2 02/09/23 1214   Post-Procedure Volume (cm^3) 4.8 cm^3 02/09/23 1214   Distance Tunneling (cm) 0 cm 02/17/23 1607   Tunneling Position ___ O'Clock 0 02/17/23 1607   Undermining Starts ___ O'Clock 0 02/17/23 1607   Undermining Ends___ O'Clock 0 02/17/23 1607   Undermining Maxium Distance (cm) 0 02/17/23 1607   Wound Assessment Slough;[place]/red 02/20/23 2010   Drainage Amount None 02/21/23 2000   Drainage Description Purulent 02/20/23 2010   Odor Malodorous/putrid 02/20/23 2010   Dina-wound Assessment Maceration 02/20/23 2010   Margins Defined edges 02/20/23 2010   Wound Thickness Description not for Pressure Injury Full thickness 02/20/23 2010   Number of days: 12       Wound 02/09/23 #7 Left 2nd toe amp site (Active)   Wound Image   02/17/23 1050   Wound Etiology Diabetic 02/20/23 2010   Dressing Status Clean;Dry; Intact 02/21/23 2000   Wound Cleansed Wound cleanser 02/20/23 2010   Dressing/Treatment ABD;Roll gauze; Pharmaceutical agent (see MAR); Tape/Soft cloth adhesive tape 02/21/23 2000   Offloading for Diabetic Foot Ulcers Other (comment) 02/18/23 1845   Dressing Change Due 02/20/23 02/20/23 1600   Wound Length (cm) 2.1 cm 02/17/23 1050   Wound Width (cm) 1.5 cm 02/17/23 1050   Wound Depth (cm) 0.1 cm 02/17/23 1050   Wound Surface Area (cm^2) 3.15 cm^2 02/17/23 1050   Change in Wound Size % (l*w) 47.5 02/17/23 1050   Wound Volume (cm^3) 0.315 cm^3 02/17/23 1050   Wound Healing % 90 02/17/23 1050   Post-Procedure Length (cm) 3 cm 02/09/23 1214   Post-Procedure Width (cm) 2 cm 02/09/23 1214   Post-Procedure Depth (cm) 0.5 cm 02/09/23 1214   Post-Procedure Surface Area (cm^2) 6 cm^2 02/09/23 1214   Post-Procedure Volume (cm^3) 3 cm^3 02/09/23 1214   Distance Tunneling (cm) 0 cm 02/17/23 1607   Tunneling Position ___ O'Clock 0 02/17/23 1607   Undermining Starts ___ O'Clock 0 02/17/23 1607   Undermining Ends___ O'Clock 0 02/17/23 1607   Undermining Maxium Distance (cm) 0 02/17/23 1607   Wound Assessment Pink/red;Slough 02/20/23 2010   Drainage Amount None 02/21/23 2000   Drainage Description Purulent 02/20/23 2010   Odor Malodorous/putrid 02/20/23 2010   Dina-wound Assessment Maceration 02/20/23 2010   Margins Defined edges 02/20/23 2010   Wound Thickness Description not for Pressure Injury Full thickness 02/20/23 2010   Number of days: 12       Wound 02/09/23 #8 Left 3rd toe (Active)   Wound Image   02/17/23 1050   Wound Etiology Diabetic 02/20/23 2010   Dressing Status Clean;Dry; Intact 02/21/23 2000   Wound Cleansed Wound cleanser 02/20/23 2010   Dressing/Treatment ABD;Pharmaceutical agent (see MAR); Roll gauze;Tape/Soft cloth adhesive tape 02/21/23 2000   Offloading for Diabetic Foot Ulcers Other (comment) 02/20/23 1600   Dressing Change Due 02/21/23 02/20/23 2010   Wound Length (cm) 2 cm 02/17/23 1050 Wound Width (cm) 1.5 cm 02/17/23 1050   Wound Depth (cm) 0.1 cm 02/17/23 1050   Wound Surface Area (cm^2) 3 cm^2 02/17/23 1050   Change in Wound Size % (l*w) 78.57 02/17/23 1050   Wound Volume (cm^3) 0.3 cm^3 02/17/23 1050   Wound Healing % 79 02/17/23 1050   Post-Procedure Length (cm) 4 cm 02/09/23 1214   Post-Procedure Width (cm) 3.5 cm 02/09/23 1214   Post-Procedure Depth (cm) 0.1 cm 02/09/23 1214   Post-Procedure Surface Area (cm^2) 14 cm^2 02/09/23 1214   Post-Procedure Volume (cm^3) 1.4 cm^3 02/09/23 1214   Distance Tunneling (cm) 0 cm 02/17/23 1607   Tunneling Position ___ O'Clock 0 02/17/23 1607   Undermining Starts ___ O'Clock 0 02/17/23 1607   Undermining Ends___ O'Clock 0 02/17/23 1607   Undermining Maxium Distance (cm) 0 02/17/23 1607   Wound Assessment Dry;Pink/red 02/20/23 2010   Drainage Amount None 02/21/23 2000   Drainage Description Serosanguinous 02/20/23 2010   Odor Malodorous/putrid 02/20/23 2010   Dina-wound Assessment Maceration 02/20/23 2010   Margins Defined edges 02/20/23 2010   Wound Thickness Description not for Pressure Injury Full thickness 02/20/23 2010   Number of days: 12       Wound 02/09/23 #9 Left 4th toe (Active)   Wound Image   02/17/23 1050   Wound Etiology Diabetic 02/20/23 2010   Dressing Status Clean;Dry; Intact 02/21/23 2000   Wound Cleansed Wound cleanser 02/20/23 2010   Dressing/Treatment ABD;Pharmaceutical agent (see MAR); Roll gauze;Tape/Soft cloth adhesive tape 02/21/23 2000   Offloading for Diabetic Foot Ulcers Other (comment) 02/20/23 1600   Dressing Change Due 02/20/23 02/20/23 1600   Wound Length (cm) 1.5 cm 02/17/23 1050   Wound Width (cm) 0.9 cm 02/17/23 1050   Wound Depth (cm) 0.1 cm 02/17/23 1050   Wound Surface Area (cm^2) 1.35 cm^2 02/17/23 1050   Change in Wound Size % (l*w) 90.07 02/17/23 1050   Wound Volume (cm^3) 0.135 cm^3 02/17/23 1050   Wound Healing % 90 02/17/23 1050   Post-Procedure Length (cm) 4 cm 02/09/23 1214   Post-Procedure Width (cm) 3.4 cm 02/09/23 1214   Post-Procedure Depth (cm) 0.1 cm 02/09/23 1214   Post-Procedure Surface Area (cm^2) 13.6 cm^2 02/09/23 1214   Post-Procedure Volume (cm^3) 1.36 cm^3 02/09/23 1214   Distance Tunneling (cm) 0 cm 02/17/23 1607   Tunneling Position ___ O'Clock 0 02/17/23 1607   Undermining Starts ___ O'Clock 0 02/17/23 1607   Undermining Ends___ O'Clock 0 02/17/23 1607   Undermining Maxium Distance (cm) 0 02/17/23 1607   Wound Assessment Dry 02/20/23 2010   Drainage Amount None 02/21/23 2000   Drainage Description Serous;Purulent 02/20/23 2010   Odor Malodorous/putrid 02/20/23 2010   Dina-wound Assessment Maceration 02/20/23 2010   Margins Defined edges 02/20/23 2010   Wound Thickness Description not for Pressure Injury Full thickness 02/20/23 1600   Number of days: 12       Wound 02/13/23 Knee Anterior; Left (Active)   Wound Image   02/17/23 1050   Wound Etiology Venous 02/20/23 2010   Dressing Status Clean;Dry; Intact 02/21/23 2000   Wound Cleansed Cleansed with saline 02/20/23 1600   Dressing/Treatment Silicone border 13/62/56 2000   Dressing Change Due 02/19/23 02/18/23 1200   Wound Length (cm) 1 cm 02/17/23 1050   Wound Width (cm) 1.3 cm 02/17/23 1050   Wound Depth (cm) 0.1 cm 02/17/23 1050   Wound Surface Area (cm^2) 1.3 cm^2 02/17/23 1050   Change in Wound Size % (l*w) -8.33 02/17/23 1050   Wound Volume (cm^3) 0.13 cm^3 02/17/23 1050   Wound Healing % -8 02/17/23 1050   Distance Tunneling (cm) 0 cm 02/17/23 1607   Tunneling Position ___ O'Clock 0 02/17/23 1607   Undermining Starts ___ O'Clock 0 02/17/23 1607   Undermining Ends___ O'Clock 0 02/17/23 1607   Undermining Maxium Distance (cm) 0 02/17/23 1607   Wound Assessment Eschar dry;Pink/red 02/20/23 2010   Drainage Amount None 02/21/23 2000   Drainage Description Serosanguinous 02/20/23 1600   Odor None 02/21/23 2000   Dina-wound Assessment Intact 02/20/23 1600   Margins Defined edges 02/20/23 2010   Wound Thickness Description not for Pressure Injury Full thickness 02/20/23 2010   Number of days: 8       Wound 02/13/23 Right;Plantar heel (Active)   Wound Image   02/17/23 1050   Wound Etiology Diabetic 02/20/23 2010   Dressing Status Clean;Dry; Intact 02/21/23 2000   Wound Cleansed Cleansed with saline 02/21/23 1516   Dressing/Treatment ABD;Gauze dressing/dressing sponge;Roll gauze;Tape/Soft cloth adhesive tape 02/21/23 2000   Offloading for Diabetic Foot Ulcers Other (comment) 02/21/23 1516   Dressing Change Due 02/20/23 02/20/23 1200   Wound Length (cm) 0.8 cm 02/17/23 1050   Wound Width (cm) 1 cm 02/17/23 1050   Wound Depth (cm) 0.2 cm 02/17/23 1050   Wound Surface Area (cm^2) 0.8 cm^2 02/17/23 1050   Change in Wound Size % (l*w) 55.56 02/17/23 1050   Wound Volume (cm^3) 0.16 cm^3 02/17/23 1050   Wound Healing % 56 02/17/23 1050   Distance Tunneling (cm) 0 cm 02/17/23 1607   Tunneling Position ___ O'Clock 0 02/17/23 1607   Undermining Starts ___ O'Clock 0 02/17/23 1607   Undermining Ends___ O'Clock 0 02/17/23 1607   Undermining Maxium Distance (cm) 0 02/17/23 1607   Wound Assessment Pink/red 02/20/23 2010   Drainage Amount None 02/21/23 2000   Drainage Description Serosanguinous 02/20/23 1600   Odor None 02/21/23 2000   Dina-wound Assessment Intact 02/20/23 1600   Margins Defined edges 02/20/23 1600   Wound Thickness Description not for Pressure Injury Partial thickness 02/20/23 1600   Number of days: 8       Incision 07/26/22 Toe (Comment  which one) Anterior;Left; Other (Comment) (Active)   Number of days: 211       Incision 12/26/20 Toe (Comment  which one) Anterior; Left (Active)   Number of days: 788        Elimination:  Continence:    Bowel: {YES / QY:88902}  Bladder: {YES / KO:35168}  Urinary Catheter: {Urinary Catheter:442419406}   Colostomy/Ileostomy/Ileal Conduit: {YES / KN:02852}       Date of Last BM: ***    Intake/Output Summary (Last 24 hours) at 2/22/2023 0927  Last data filed at 2/22/2023 0300  Gross per 24 hour   Intake 975 ml   Output 2010 ml   Net -1035 ml     I/O last 3 completed shifts: In: 1893.4 [P.O.:125;  I.V.:986.1; IV Piggyback:782.3]  Out: 3705 [Urine:3325; Chest Tube:380]    Safety Concerns:     [address] Safety Concerns:309072170}    Impairments/Disabilities:      [address] [facility] Impairments/Disabilities:606498405}        Patient's personal belongings (please select all that are sent with patient):  {CHP DME Belongings:268334387}    RN SIGNATURE:  {Esignature:360375726}    CASE MANAGEMENT/SOCIAL WORK SECTION    Inpatient Status Date: ***    Readmission Risk Assessment Score:  Readmission Risk              Risk of Unplanned Readmission:  38           Discharging to Facility/ Agency   Name:   Address:  Phone:  Fax:    Dialysis Facility (if applicable)   Name:  Address:  Dialysis Schedule:  Phone:  Fax:    / signature: {Esignature:464873232}    PHYSICIAN SECTION    Nutrition Therapy:  Current Nutrition Therapy:   [address] [facility] Diet List:018542438}    Routes of Feeding: {CHP DME Other Feedings:419257716}  Liquids: {Slp liquid thickness:50577}  Daily Fluid Restriction: {CHP DME Yes amt example:986017673}  Last Modified Barium Swallow with Video (Video Swallowing Test): {Done Not Done MZTV:156619656}    Treatments at the Time of Hospital Discharge:   Respiratory Treatments: ***  Oxygen Therapy:  {Therapy; copd oxygen:99249}  Ventilator:    {MH CC Vent WHFV:486068010}    Rehab Therapies: {THERAPEUTIC INTERVENTION:1250479745}  Weight Bearing Status/Restrictions: { CC Weight Bearin}  Other Medical Equipment (for information only, NOT a DME order):  {EQUIPMENT:844617402}  Other Treatments: ***    Prognosis: {Prognosis:0355522166}    Condition at Discharge: [address] Patient Condition:784014635}    Rehab Potential (if transferring to Rehab): {Prognosis:8514403074}    Recommended Labs or Other Treatments After Discharge: ***    Physician Certification: I certify the above information and transfer of Tegankamla Adamson  is necessary for the continuing treatment of the diagnosis listed and that he requires {Admit to Appropriate Level of Care:65423} for {GREATER/LESS:149312743} 30 days.      Update Admission H&P: {CHP DME Changes in UJORM:648657548}    PHYSICIAN SIGNATURE:  {Esignature:370195831}

## 2023-02-22 NOTE — PROGRESS NOTES
Small bore right-sided chest tube removed. Sutures removed dry sterile dressing applied. Patient tolerated. Chest x-ray ordered for tomorrow a.m. We will continue to follow patient closely.       Leti ARIZA-ACNP  Critical Care Nurse Practitioner   JAMILAH

## 2023-02-22 NOTE — PLAN OF CARE
Problem: Pain  Goal: Verbalizes/displays adequate comfort level or baseline comfort level  2/22/2023 0758 by Romeo Choudhary RN  Outcome: Progressing    Problem: Safety - Adult  Goal: Free from fall injury  2/22/2023 0758 by Romeo Choudhary RN  Outcome: Progressing       Problem: ABCDS Injury Assessment  Goal: Absence of physical injury  2/22/2023 0758 by Romeo Choudhary RN  Outcome: Progressing

## 2023-02-22 NOTE — PLAN OF CARE
Problem: Discharge Planning  Goal: Discharge to home or other facility with appropriate resources  Outcome: Progressing     Problem: Pain  Goal: Verbalizes/displays adequate comfort level or baseline comfort level  2/22/2023 1351 by Angel Boland RN  Outcome: Progressing  2/22/2023 0758 by Vasu Huber RN  Outcome: Progressing     Problem: Chronic Conditions and Co-morbidities  Goal: Patient's chronic conditions and co-morbidity symptoms are monitored and maintained or improved  Outcome: Progressing     Problem: Nutrition Deficit:  Goal: Optimize nutritional status  Outcome: Progressing     Problem: Skin/Tissue Integrity  Goal: Absence of new skin breakdown  Description: 1. Monitor for areas of redness and/or skin breakdown  2. Assess vascular access sites hourly  3. Every 4-6 hours minimum:  Change oxygen saturation probe site  4. Every 4-6 hours:  If on nasal continuous positive airway pressure, respiratory therapy assess nares and determine need for appliance change or resting period.   Outcome: Progressing     Problem: Safety - Adult  Goal: Free from fall injury  2/22/2023 1351 by Angel Boland RN  Outcome: Progressing  2/22/2023 0758 by Vasu Huber RN  Outcome: Progressing     Problem: ABCDS Injury Assessment  Goal: Absence of physical injury  2/22/2023 1351 by Angel Boland RN  Outcome: Progressing  2/22/2023 0758 by Vasu Huber RN  Outcome: Progressing

## 2023-02-23 ENCOUNTER — APPOINTMENT (OUTPATIENT)
Dept: GENERAL RADIOLOGY | Age: 73
DRG: 981 | End: 2023-02-23
Payer: MEDICARE

## 2023-02-23 LAB
ALBUMIN SERPL-MCNC: 2.6 GM/DL (ref 3.4–5)
ALP BLD-CCNC: 64 IU/L (ref 40–128)
ALT SERPL-CCNC: 7 U/L (ref 10–40)
ANION GAP SERPL CALCULATED.3IONS-SCNC: 8 MMOL/L (ref 4–16)
AST SERPL-CCNC: 12 IU/L (ref 15–37)
BACTERIA: NEGATIVE /HPF
BILIRUB SERPL-MCNC: 0.5 MG/DL (ref 0–1)
BILIRUBIN URINE: NEGATIVE MG/DL
BLOOD, URINE: ABNORMAL
BUN SERPL-MCNC: 67 MG/DL (ref 6–23)
CALCIUM SERPL-MCNC: 8.2 MG/DL (ref 8.3–10.6)
CHLORIDE BLD-SCNC: 98 MMOL/L (ref 99–110)
CLARITY: ABNORMAL
CO2: 30 MMOL/L (ref 21–32)
COLOR: YELLOW
CREAT SERPL-MCNC: 3.3 MG/DL (ref 0.9–1.3)
CREATININE URINE: 106.3 MG/DL (ref 39–259)
GFR SERPL CREATININE-BSD FRML MDRD: 19 ML/MIN/1.73M2
GLUCOSE BLD-MCNC: 94 MG/DL (ref 70–99)
GLUCOSE BLD-MCNC: 96 MG/DL (ref 70–99)
GLUCOSE BLD-MCNC: 99 MG/DL (ref 70–99)
GLUCOSE SERPL-MCNC: 116 MG/DL (ref 70–99)
GLUCOSE, URINE: NEGATIVE MG/DL
KETONES, URINE: ABNORMAL MG/DL
LEUKOCYTE ESTERASE, URINE: NEGATIVE
MUCUS: ABNORMAL HPF
NITRITE URINE, QUANTITATIVE: NEGATIVE
PH, URINE: 5.5 (ref 5–8)
POTASSIUM SERPL-SCNC: 4.3 MMOL/L (ref 3.5–5.1)
PROT/CREAT RATIO, UR: 1.4
PROTEIN UA: 100 MG/DL
RBC URINE: 39 /HPF (ref 0–3)
SODIUM BLD-SCNC: 136 MMOL/L (ref 135–145)
SPECIFIC GRAVITY UA: 1.02 (ref 1–1.03)
TOTAL PROTEIN: 6.2 GM/DL (ref 6.4–8.2)
TRICHOMONAS: ABNORMAL /HPF
URINE TOTAL PROTEIN: 150.2 MG/DL
UROBILINOGEN, URINE: 0.2 MG/DL (ref 0.2–1)
WBC UA: 4 /HPF (ref 0–2)

## 2023-02-23 PROCEDURE — 6370000000 HC RX 637 (ALT 250 FOR IP): Performed by: STUDENT IN AN ORGANIZED HEALTH CARE EDUCATION/TRAINING PROGRAM

## 2023-02-23 PROCEDURE — 71045 X-RAY EXAM CHEST 1 VIEW: CPT

## 2023-02-23 PROCEDURE — 2700000000 HC OXYGEN THERAPY PER DAY

## 2023-02-23 PROCEDURE — 94640 AIRWAY INHALATION TREATMENT: CPT

## 2023-02-23 PROCEDURE — 2060000000 HC ICU INTERMEDIATE R&B

## 2023-02-23 PROCEDURE — 80053 COMPREHEN METABOLIC PANEL: CPT

## 2023-02-23 PROCEDURE — 92526 ORAL FUNCTION THERAPY: CPT

## 2023-02-23 PROCEDURE — 82570 ASSAY OF URINE CREATININE: CPT

## 2023-02-23 PROCEDURE — 6360000002 HC RX W HCPCS: Performed by: INTERNAL MEDICINE

## 2023-02-23 PROCEDURE — 6370000000 HC RX 637 (ALT 250 FOR IP): Performed by: NURSE PRACTITIONER

## 2023-02-23 PROCEDURE — 82962 GLUCOSE BLOOD TEST: CPT

## 2023-02-23 PROCEDURE — 6360000002 HC RX W HCPCS: Performed by: STUDENT IN AN ORGANIZED HEALTH CARE EDUCATION/TRAINING PROGRAM

## 2023-02-23 PROCEDURE — 84156 ASSAY OF PROTEIN URINE: CPT

## 2023-02-23 PROCEDURE — 6360000002 HC RX W HCPCS: Performed by: NURSE PRACTITIONER

## 2023-02-23 PROCEDURE — 81001 URINALYSIS AUTO W/SCOPE: CPT

## 2023-02-23 PROCEDURE — 2580000003 HC RX 258: Performed by: NURSE PRACTITIONER

## 2023-02-23 PROCEDURE — 94761 N-INVAS EAR/PLS OXIMETRY MLT: CPT

## 2023-02-23 RX ADMIN — HYDROMORPHONE HYDROCHLORIDE 0.25 MG: 1 INJECTION, SOLUTION INTRAMUSCULAR; INTRAVENOUS; SUBCUTANEOUS at 14:07

## 2023-02-23 RX ADMIN — HEPARIN SODIUM 5000 UNITS: 5000 INJECTION INTRAVENOUS; SUBCUTANEOUS at 05:07

## 2023-02-23 RX ADMIN — METRONIDAZOLE 500 MG: 250 TABLET ORAL at 05:07

## 2023-02-23 RX ADMIN — METRONIDAZOLE 500 MG: 250 TABLET ORAL at 21:05

## 2023-02-23 RX ADMIN — IPRATROPIUM BROMIDE AND ALBUTEROL SULFATE 1 AMPULE: 2.5; .5 SOLUTION RESPIRATORY (INHALATION) at 07:46

## 2023-02-23 RX ADMIN — HYDROMORPHONE HYDROCHLORIDE 0.25 MG: 1 INJECTION, SOLUTION INTRAMUSCULAR; INTRAVENOUS; SUBCUTANEOUS at 09:48

## 2023-02-23 RX ADMIN — ASPIRIN 81 MG CHEWABLE TABLET 81 MG: 81 TABLET CHEWABLE at 08:51

## 2023-02-23 RX ADMIN — METRONIDAZOLE 500 MG: 250 TABLET ORAL at 14:08

## 2023-02-23 RX ADMIN — SILVER SULFADIAZINE: 10 CREAM TOPICAL at 14:36

## 2023-02-23 RX ADMIN — FAMOTIDINE 20 MG: 20 TABLET ORAL at 08:51

## 2023-02-23 RX ADMIN — HEPARIN SODIUM 5000 UNITS: 5000 INJECTION INTRAVENOUS; SUBCUTANEOUS at 14:37

## 2023-02-23 RX ADMIN — LINEZOLID 600 MG: 600 TABLET ORAL at 21:16

## 2023-02-23 RX ADMIN — FUROSEMIDE 80 MG: 10 INJECTION, SOLUTION INTRAMUSCULAR; INTRAVENOUS at 21:06

## 2023-02-23 RX ADMIN — COLLAGENASE SANTYL: 250 OINTMENT TOPICAL at 14:38

## 2023-02-23 RX ADMIN — LINEZOLID 600 MG: 600 TABLET ORAL at 09:04

## 2023-02-23 RX ADMIN — IPRATROPIUM BROMIDE AND ALBUTEROL SULFATE 1 AMPULE: 2.5; .5 SOLUTION RESPIRATORY (INHALATION) at 19:40

## 2023-02-23 RX ADMIN — SODIUM HYPOCHLORITE: 1.25 SOLUTION TOPICAL at 14:36

## 2023-02-23 RX ADMIN — FUROSEMIDE 80 MG: 10 INJECTION, SOLUTION INTRAMUSCULAR; INTRAVENOUS at 14:08

## 2023-02-23 RX ADMIN — HEPARIN SODIUM 5000 UNITS: 5000 INJECTION INTRAVENOUS; SUBCUTANEOUS at 21:05

## 2023-02-23 RX ADMIN — SODIUM CHLORIDE, PRESERVATIVE FREE 10 ML: 5 INJECTION INTRAVENOUS at 09:48

## 2023-02-23 ASSESSMENT — ENCOUNTER SYMPTOMS
BACK PAIN: 0
EYE REDNESS: 0
NAUSEA: 0
CONSTIPATION: 0
WHEEZING: 0
CHEST TIGHTNESS: 0
COUGH: 0
SHORTNESS OF BREATH: 0
SINUS PRESSURE: 0
EYE ITCHING: 0
DIARRHEA: 0
ABDOMINAL PAIN: 0
EYE PAIN: 0
VOMITING: 0
SINUS PAIN: 0
SORE THROAT: 0

## 2023-02-23 ASSESSMENT — PAIN SCALES - GENERAL
PAINLEVEL_OUTOF10: 7
PAINLEVEL_OUTOF10: 3

## 2023-02-23 NOTE — PROGRESS NOTES
V2.0  [facility] Hospitalist Progress Note      Name:  Jessica Dhillon /Age/Sex: 1950  (67 y.o. male)   MRN & CSN:  [id number] & [id number] Encounter Date/Time: 2023 6:51 PM EST    Location:  [facility] PCP: Darling Avitia Day: 13    Assessment and Plan:   Jessica Dhillon is a 67 y.o. male with pmh of  CAD, HFpEF, ICD, CKD, DM who presents with Acute respiratory failure with hypoxia and hypercapnia ([facility] [address]    Plan:  Acute hypoxic Hypercapneic respiratory failure with Pneumonia: was initially placed on BIPAP but later was intubated on MV was started on Empiric broad spectrum. CXR (): worsening R base opacity and small L pleural effusion. Now extubated (2023) and on 4L/min today. Wean O2 as able. On linezolid, levofloxacin and metronidazole. Cardiac arrest due to hypoxia: underwent CPR after pulse became thready after patient was reintubated when he was not being ventilated. Chest tube was also placed. Off dexmedetomidine   Acute renal failure with h/o CKD stage IV: was initially started on HD but could not tolerate. CRRT stopped. Follow up on nephro recs. Keep HD cath for now. On lasix 80mg IV TID per nephro. Unsure if he will need HD long term. Cr worsening to 3.3 today. Hyperkalemia-resolved: in the setting of above, improving  Elevated troponin: likely type 2 MI in the setting of respiratory as well as kidney failure, does have h/o CAD with multiple stents, Cardio on board. HFpEF:  EF 50-55% in . Repeat echo with simillar EF, hypokinetic RV with bowing of Interventricular septum towards LV. Chronic LE wounds: Wound care on board. Likely infected, Purulent drainage from RLE 1st metatarsal which is foul smelling. General surgery on consult, appreciate recs. Wound cultures sent - growing Pseudomonas aeruginosa, Citrobacter freundii MRSA, Enterococcus faecalis, Bacteroides thetaiotaomicron.  On linezolid 600mg IV BID till 2023, levofloxacin PO 750mg every other day till 2/28/2023, metronidazole 500mg TID PO till 3/8/2023  Class II Obesity. BMI 35.82  Type II DM. On Lanus 5U HS. Diet ADULT ORAL NUTRITION SUPPLEMENT; Lunch, Dinner; Diabetic Oral Supplement  ADULT DIET; Dysphagia - Pureed; Mildly Thick (Nectar)   DVT Prophylaxis [] Lovenox, [x]  Heparin, [] SCDs, [] Ambulation,  [] Eliquis, [] Xarelto  [] Coumadin   Code Status Full Code   Disposition From: Home  Expected Disposition: TBD  Estimated Date of Discharge: 1-2 days  Patient requires continued admission due to Respiratory and renal failure. Pending clearance from nephrology regarding dialysis - for IV lasix 80mg TID per nephro    Surrogate Decision Maker/ POA      Subjective:     Chief Complaint: Respiratory Distress     Patient awake and alert. Oriented X 3 today. Pt. States he feels good except for B/L LE pain. Noted pt. Was on 4L/min when seen. Noted overnight difficulty swallowing and in the AM today. Improved by the time I saw him. Per nursing, SLP saw patient in the AM today but I do not see a note yet. Review of Systems:    Review of Systems   Constitutional:  Negative for appetite change, chills, fatigue, fever and unexpected weight change. HENT:  Negative for sinus pressure, sinus pain and sore throat. Eyes:  Negative for pain, redness and itching. Respiratory:  Negative for cough, chest tightness, shortness of breath and wheezing. Cardiovascular:  Negative for chest pain, palpitations and leg swelling. Gastrointestinal:  Negative for abdominal pain, constipation, diarrhea, nausea and vomiting. Endocrine: Negative for polydipsia, polyphagia and polyuria. Genitourinary:  Negative for decreased urine volume, difficulty urinating, dysuria, frequency, hematuria and urgency. Musculoskeletal:  Positive for myalgias. Negative for arthralgias and back pain. Skin:  Positive for wound. Negative for pallor and rash.    Neurological:  Negative for dizziness, tremors, seizures, syncope, weakness, light-headedness, numbness and headaches. Psychiatric/Behavioral:  Negative for agitation and confusion. Objective: Intake/Output Summary (Last 24 hours) at 2/23/2023 1133  Last data filed at 2/23/2023 0600  Gross per 24 hour   Intake 676.66 ml   Output 755 ml   Net -78.34 ml          Vitals:   Vitals:    02/23/23 0948   BP: 137/77   Pulse: 70   Resp: 18   Temp:    SpO2: 98%       Physical Exam:   Physical Exam  Vitals and nursing note reviewed. Constitutional:       General: He is not in acute distress. Appearance: He is obese. He is not ill-appearing, toxic-appearing or diaphoretic. Interventions: Nasal cannula in place. Comments: 4L/min   HENT:      Head: Normocephalic and atraumatic. Right Ear: External ear normal.      Left Ear: External ear normal.      Nose: Nose normal.      Mouth/Throat:      Mouth: Mucous membranes are moist.      Pharynx: Oropharynx is clear. Eyes:      General: No scleral icterus. Right eye: No discharge. Left eye: No discharge. Conjunctiva/sclera: Conjunctivae normal.      Pupils: Pupils are equal, round, and reactive to light. Cardiovascular:      Rate and Rhythm: Normal rate and regular rhythm. Pulses: Normal pulses. Heart sounds: Normal heart sounds. No murmur heard. No friction rub. No gallop. Pulmonary:      Effort: Pulmonary effort is normal. No respiratory distress. Breath sounds: Normal breath sounds. Decreased air movement and transmitted upper airway sounds (Improved) present. No stridor. No decreased breath sounds, wheezing, rhonchi or rales. Abdominal:      General: Bowel sounds are normal. There is no distension. Palpations: Abdomen is soft. There is no mass. Tenderness: There is no abdominal tenderness. There is no guarding or rebound. Hernia: No hernia is present. Musculoskeletal:      Right lower leg: No edema. Left lower leg: No edema.       Comments: B/L STAR bandaged   Skin:     Capillary Refill: Capillary refill takes less than 2 seconds. Neurological:      Mental Status: He is alert and oriented to person, place, and time.          Medications:   Medications:    levoFLOXacin  750 mg Oral Every Other Day    metroNIDAZOLE  500 mg Oral 3 times per day    linezolid  600 mg Oral 2 times per day    silver sulfADIAZINE   Topical Daily    insulin glargine  5 Units SubCUTAneous Nightly    furosemide  80 mg IntraVENous TID    famotidine  20 mg Per NG tube Daily    heparin (porcine)  5,000 Units SubCUTAneous 3 times per day    sodium hypochlorite   Irrigation Daily    collagenase   Topical Daily    sodium chloride flush  5-40 mL IntraVENous 2 times per day    aspirin  81 mg Oral Daily    [Held by provider] clopidogrel  75 mg Oral Daily    ipratropium-albuterol  1 ampule Inhalation Q4H WA      Infusions:    dextrose      sodium chloride 10 mL/hr at 02/21/23 0644     PRN Meds: HYDROmorphone, 0.25 mg, Q4H PRN  albuterol, 2.5 mg, Q2H PRN  heparin flush, 240 Units, PRN  glucose, 4 tablet, PRN  dextrose bolus, 125 mL, PRN   Or  dextrose bolus, 250 mL, PRN  glucagon (rDNA), 1 mg, PRN  dextrose, , Continuous PRN  sodium chloride flush, 10 mL, PRN  sodium chloride, , PRN      Labs      Recent Results (from the past 24 hour(s))   POCT Glucose    Collection Time: 02/22/23  6:40 PM   Result Value Ref Range    POC Glucose 179 (H) 70 - 99 MG/DL   POCT Glucose    Collection Time: 02/22/23 11:01 PM   Result Value Ref Range    POC Glucose 181 (H) 70 - 99 MG/DL   Comprehensive Metabolic Panel    Collection Time: 02/23/23  5:15 AM   Result Value Ref Range    Sodium 136 135 - 145 MMOL/L    Potassium 4.3 3.5 - 5.1 MMOL/L    Chloride 98 (L) 99 - 110 mMol/L    CO2 30 21 - 32 MMOL/L    BUN 67 (H) 6 - 23 MG/DL    Creatinine 3.3 (H) 0.9 - 1.3 MG/DL    Est, Glom Filt Rate 19 (L) >60 mL/min/1.73m2    Glucose 116 (H) 70 - 99 MG/DL    Calcium 8.2 (L) 8.3 - 10.6 MG/DL    Albumin 2.6 (L) 3.4 - 5.0 GM/DL Total Protein 6.2 (L) 6.4 - 8.2 GM/DL    Total Bilirubin 0.5 0.0 - 1.0 MG/DL    ALT 7 (L) 10 - 40 U/L    AST 12 (L) 15 - 37 IU/L    Alkaline Phosphatase 64 40 - 128 IU/L    Anion Gap 8 4 - 16   Urinalysis    Collection Time: 02/23/23 10:15 AM   Result Value Ref Range    Color, UA YELLOW YELLOW    Clarity, UA SLIGHTLY CLOUDY (A) CLEAR    Glucose, Urine NEGATIVE NEGATIVE MG/DL    Bilirubin Urine NEGATIVE NEGATIVE MG/DL    Ketones, Urine TRACE (A) NEGATIVE MG/DL    Specific Gravity, UA 1.020 1.001 - 1.035    Blood, Urine LARGE NUMBER OR AMOUNT OBSERVED (A) NEGATIVE    pH, Urine 5.5 5.0 - 8.0    Protein,  (A) NEGATIVE MG/DL    Urobilinogen, Urine 0.2 0.2 - 1.0 MG/DL    Nitrite Urine, Quantitative NEGATIVE NEGATIVE    Leukocyte Esterase, Urine NEGATIVE NEGATIVE   Protein / creatinine ratio, urine    Collection Time: 02/23/23 10:15 AM   Result Value Ref Range    Urine Total Protein 150.2 (H) <12 MG/DL    Creatinine, Ur 106.3 39 - 259 MG/DL    Prot/Creat Ratio, Ur 1.4 (H) <0.2        Imaging/Diagnostics Last 24 Hours   XR CHEST PORTABLE    Result Date: 2/11/2023  EXAMINATION: ONE XRAY VIEW OF THE CHEST 2/11/2023 7:29 pm COMPARISON: Chest radiograph 02/11/2023 HISTORY: ORDERING SYSTEM PROVIDED HISTORY: ETT placement TECHNOLOGIST PROVIDED HISTORY: Reason for exam:->ETT placement Reason for Exam: et and og tube placement confirmation Additional signs and symptoms: et and og tube placement confrimation FINDINGS: Lines and tubes: -ETT terminates at the superior margin of the clavicles. -enteric tube takes a the subdiaphragmatic course and terminates out of the field of view -right-sided Cordis catheter, which terminates within the mid/upper SVC Lungs: There is indistinctness of the pulmonary vasculature with patchy opacities within the right greater than left lungs. . Pleura: Small right-sided pleural effusion. Cardiomediastinal silhouette: Enlarged cardiac silhouette. Bones: No acute osseous findings.  Soft tissues: Left-sided 2 lead cardiac device. Lines and tubes as above. The ETT terminates at the level of the superior margin of the clavicles. Grossly unchanged pulmonary exam.  Findings favor cardiogenic pulmonary edema. However a superimposed infectious process cannot be excluded.        Electronically signed by Timmy Moralez MD on 2/23/2023 at 11:33 AM

## 2023-02-23 NOTE — PROGRESS NOTES
Transferred to room 2005--Met with Hunter Rodriguez RN for handoff at bedside. Pt alert and oriented to self and location only at this time. Attempted to call daughter but number goes straight to  that isn't able to receive messages.

## 2023-02-23 NOTE — PROGRESS NOTES
[id number] [place] OF SPEECH/LANGUAGE PATHOLOGY  DAILY PROGRESS NOTE  Tegan Adamson  2/23/2023  [id number]  Hyperkalemia [E87.5]  Elevated troponin [R77.8]  Acidosis, metabolic, with respiratory acidosis [E87.4]  Acute respiratory failure with hypoxia and hypercapnia (HCC) [J96.01, J96.02]  Acute on chronic heart failure, unspecified heart failure type (HCC) [I50.9]  Acute kidney injury superimposed on CKD ([facility] [address] [N17.9, N18.9]  Acute kidney injury superimposed on chronic kidney disease ([facility] [address] [N17.9, N18.9]  Allergies   Allergen Reactions    Pcn [Penicillins] Hives    Fentanyl Itching         Pt was seen this date for dysphagia treatment. IMPRESSION AND RECOMMENDATIONS:   Per chart review, pt's family visited yesterday 2/22 and family was upset regarding breakfast (soft and bite sized diet/nectar thick liquids) being provided to pt. Pt also with coughing noted with meds in puree this AM, 2/23. Tegan Adamson was seen for swallow treatment seated upright in bed, pt was groaning throughout session and pt reported he was in pain, nurse present at bedside. Pt with noted decreased coughing and throat clearing this day and prior to po trials. Pt seen with PO trials of nectar via straw and puree  Oral phase was mildly impaired characterized by intact labial seal, oral holding of puree, slightly delayed AP transfer, and adequate oral clearance. Pt with no s/s of aspiration noted with nectar and puree. Recommend downgrade to puree diet and continue nectar thick liquids. Recommend continue feed assist. Recommend pills crushed in puree as able. SLP will continue to follow for monitoring of diet tolerance and po trials of advanced diet. Results/recommendations d/w pt and nurse. SLP will attempt to meet with family for education and to determine pt's baseline diet.        GOALS (current status in bold):  Short-term Goals  Timeframe for Short-term Goals: Length of stay  Goal 1: Pt will tolerate soft and bite-sized diet and nectar thick liquids with no s/s of aspiration Downgraded to puree, tolerating NTL  Goal 2: Pt will tolerate PO trials of advanced diet with no s/s of aspiration progressing, cont. Goal 3: Pt/caregivers will demonstrate understanding of recommendations and POC progressing, cont.                             EDUCATION: results/recommendation and POC    PAIN RATING (0-10 Scale): n/a  Time in/Time out: SLP Individual Minutes  Time In: 0830  Time Out: 0900  Minutes: 30    Visit number: [address] [facility],[address], [place] CCC-SLP   2/23/2023  9:03 AM

## 2023-02-23 NOTE — FLOWSHEET NOTE
Attempted to give am dose of flagyl by crushing med and giving with pudding. Pt began to aspirate so med admin was stopped. Will pass on to day shift for re-eval or possibly NG for both med admin and nutritional support.

## 2023-02-23 NOTE — PROGRESS NOTES
Nephrology Progress Note  2/23/2023 9:14 AM        Subjective:   Admit Date: 2/11/2023  PCP: Robin Burnette    Interval History: Patient seen early morning, this is a late entry  He is alert awake and oriented      Diet: None-as he failed a swallow evaluation    ROS: No overt shortness of breath,-urine output dropped to 805 cc for the last 24 hours as diuretics is on hold-no fever  Right pleural space chest tube has been removed  No fever    Data:     Current meds:    levoFLOXacin  750 mg Oral Every Other Day    metroNIDAZOLE  500 mg Oral 3 times per day    linezolid  600 mg Oral 2 times per day    silver sulfADIAZINE   Topical Daily    insulin glargine  5 Units SubCUTAneous Nightly    [Held by provider] furosemide  80 mg IntraVENous TID    famotidine  20 mg Per NG tube Daily    heparin (porcine)  5,000 Units SubCUTAneous 3 times per day    sodium hypochlorite   Irrigation Daily    collagenase   Topical Daily    sodium chloride flush  5-40 mL IntraVENous 2 times per day    aspirin  81 mg Oral Daily    [Held by provider] clopidogrel  75 mg Oral Daily    ipratropium-albuterol  1 ampule Inhalation Q4H WA      dextrose      sodium chloride 10 mL/hr at 02/21/23 0644         I/O last 3 completed shifts: In: 676.7 [IV Piggyback:676.7]  Out: 9073 [Urine:1705;  Chest Tube:50]    CBC:   Recent Labs     02/22/23  0509   WBC 5.0   HGB 7.8*             Recent Labs     02/21/23  0400 02/22/23  0509 02/23/23  0515    136 136   K 4.1 4.3 4.3   CL 97* 96* 98*   CO2 28 29 30   BUN 60* 63* 67*   CREATININE 2.9* 3.1* 3.3*   GLUCOSE 147* 174* 116*       Lab Results   Component Value Date    CALCIUM 8.2 (L) 02/23/2023    PHOS 3.7 02/22/2023       Objective:     Vitals: BP (!) 122/54   Pulse 69   Temp 98.3 °F (36.8 °C) (Oral)   Resp 12   Ht 6' (1.829 m)   Wt 264 lb 1.8 oz (119.8 kg)   SpO2 100%   BMI 35.82 kg/m² ,    General appearance: Alert, awake and oriented but for emotional-which happened before  HEENT: Mild conjunctival pallor no trang scleral icterus  Neck: Supple, with supplemental oxygenation per nasal cannula-right IJ temporary dialysis catheter  Lungs: Positive adventitious breath sound  Heart: Regular rate and rhythm, left chest to implanted cardiac device  Abdomen: Soft, nontender  Extremities: Bilateral leg edema and chronic wound  Has a Fuller catheter      Problem List :         Impression :     Stage III acute kidney injury with underlying CKD stage IV A3-nonoliguric but urine output dropped-BUN/creatinine are rising again-  Chest x-ray suggested pulmonary edema will restart IV loop  Recent shock syndrome with multiorgan dysfunction  Underlying multiple chronic disease, which include but not limited to atherosclerotic cardiovascular disease, and aftermath of acute illness    Recommendation/Plan  :     Restart diuretics again-as he may have renal vein congestion-I will keep the dialysis catheter for the weekend-as I am unable to predict his kidney course-I did talk to his daughter yesterday-he remains on multiple antimicrobial agent-we will watch for iatrogenic and nosocomial complication-supportive care and follow clinically and biochemically-also redo UA and urinary indicis      Rhea Norton MD MD

## 2023-02-24 ENCOUNTER — HOSPITAL ENCOUNTER (OUTPATIENT)
Dept: WOUND CARE | Age: 73
Discharge: HOME | End: 2023-02-24

## 2023-02-24 ENCOUNTER — APPOINTMENT (OUTPATIENT)
Dept: ULTRASOUND IMAGING | Age: 73
DRG: 981 | End: 2023-02-24
Payer: MEDICARE

## 2023-02-24 PROBLEM — L97.512 ULCER OF BOTH FEET WITH FAT LAYER EXPOSED (HCC): Status: ACTIVE | Noted: 2023-02-24

## 2023-02-24 PROBLEM — L97.522 ULCER OF BOTH FEET WITH FAT LAYER EXPOSED (HCC): Status: ACTIVE | Noted: 2023-02-24

## 2023-02-24 LAB
ALBUMIN SERPL-MCNC: 2.7 GM/DL (ref 3.4–5)
ALP BLD-CCNC: 60 IU/L (ref 40–128)
ALT SERPL-CCNC: 6 U/L (ref 10–40)
ANION GAP SERPL CALCULATED.3IONS-SCNC: 9 MMOL/L (ref 4–16)
AST SERPL-CCNC: 11 IU/L (ref 15–37)
BASOPHILS ABSOLUTE: 0.1 K/CU MM
BASOPHILS RELATIVE PERCENT: 0.9 % (ref 0–1)
BILIRUB SERPL-MCNC: 0.5 MG/DL (ref 0–1)
BUN SERPL-MCNC: 64 MG/DL (ref 6–23)
CALCIUM SERPL-MCNC: 8.3 MG/DL (ref 8.3–10.6)
CHLORIDE BLD-SCNC: 102 MMOL/L (ref 99–110)
CO2: 29 MMOL/L (ref 21–32)
CREAT SERPL-MCNC: 3.8 MG/DL (ref 0.9–1.3)
CRP SERPL HS-MCNC: 38.4 MG/L
DIFFERENTIAL TYPE: ABNORMAL
EOSINOPHILS ABSOLUTE: 0.1 K/CU MM
EOSINOPHILS RELATIVE PERCENT: 2.4 % (ref 0–3)
GFR SERPL CREATININE-BSD FRML MDRD: 16 ML/MIN/1.73M2
GLUCOSE BLD-MCNC: 111 MG/DL (ref 70–99)
GLUCOSE BLD-MCNC: 78 MG/DL (ref 70–99)
GLUCOSE BLD-MCNC: 91 MG/DL (ref 70–99)
GLUCOSE BLD-MCNC: 99 MG/DL (ref 70–99)
GLUCOSE SERPL-MCNC: 77 MG/DL (ref 70–99)
HCT VFR BLD CALC: 27 % (ref 42–52)
HEMOGLOBIN: 7.9 GM/DL (ref 13.5–18)
IMMATURE NEUTROPHIL %: 0.2 % (ref 0–0.43)
LYMPHOCYTES ABSOLUTE: 0.9 K/CU MM
LYMPHOCYTES RELATIVE PERCENT: 16.8 % (ref 24–44)
MAGNESIUM: 2.1 MG/DL (ref 1.8–2.4)
MCH RBC QN AUTO: 26.2 PG (ref 27–31)
MCHC RBC AUTO-ENTMCNC: 29.3 % (ref 32–36)
MCV RBC AUTO: 89.7 FL (ref 78–100)
MONOCYTES ABSOLUTE: 0.4 K/CU MM
MONOCYTES RELATIVE PERCENT: 8.2 % (ref 0–4)
NUCLEATED RBC %: 0 %
PDW BLD-RTO: 18.8 % (ref 11.7–14.9)
PHOSPHORUS: 4.1 MG/DL (ref 2.5–4.9)
PLATELET # BLD: 206 K/CU MM (ref 140–440)
PMV BLD AUTO: 9.2 FL (ref 7.5–11.1)
POTASSIUM SERPL-SCNC: 4.3 MMOL/L (ref 3.5–5.1)
PROCALCITONIN SERPL-MCNC: 0.26 NG/ML
RBC # BLD: 3.01 M/CU MM (ref 4.6–6.2)
SEGMENTED NEUTROPHILS ABSOLUTE COUNT: 3.8 K/CU MM
SEGMENTED NEUTROPHILS RELATIVE PERCENT: 71.5 % (ref 36–66)
SODIUM BLD-SCNC: 140 MMOL/L (ref 135–145)
TOTAL IMMATURE NEUTOROPHIL: 0.01 K/CU MM
TOTAL NUCLEATED RBC: 0 K/CU MM
TOTAL PROTEIN: 6.1 GM/DL (ref 6.4–8.2)
TOTAL RETICULOCYTE COUNT: 0.07 K/CU MM
WBC # BLD: 5.4 K/CU MM (ref 4–10.5)

## 2023-02-24 PROCEDURE — 93971 EXTREMITY STUDY: CPT

## 2023-02-24 PROCEDURE — 86140 C-REACTIVE PROTEIN: CPT

## 2023-02-24 PROCEDURE — 94640 AIRWAY INHALATION TREATMENT: CPT

## 2023-02-24 PROCEDURE — 94761 N-INVAS EAR/PLS OXIMETRY MLT: CPT

## 2023-02-24 PROCEDURE — 84145 PROCALCITONIN (PCT): CPT

## 2023-02-24 PROCEDURE — 82962 GLUCOSE BLOOD TEST: CPT

## 2023-02-24 PROCEDURE — 84100 ASSAY OF PHOSPHORUS: CPT

## 2023-02-24 PROCEDURE — 6360000002 HC RX W HCPCS: Performed by: STUDENT IN AN ORGANIZED HEALTH CARE EDUCATION/TRAINING PROGRAM

## 2023-02-24 PROCEDURE — 6370000000 HC RX 637 (ALT 250 FOR IP): Performed by: STUDENT IN AN ORGANIZED HEALTH CARE EDUCATION/TRAINING PROGRAM

## 2023-02-24 PROCEDURE — 92526 ORAL FUNCTION THERAPY: CPT

## 2023-02-24 PROCEDURE — 80053 COMPREHEN METABOLIC PANEL: CPT

## 2023-02-24 PROCEDURE — 2060000000 HC ICU INTERMEDIATE R&B

## 2023-02-24 PROCEDURE — 97535 SELF CARE MNGMENT TRAINING: CPT

## 2023-02-24 PROCEDURE — 85025 COMPLETE CBC W/AUTO DIFF WBC: CPT

## 2023-02-24 PROCEDURE — 97530 THERAPEUTIC ACTIVITIES: CPT

## 2023-02-24 PROCEDURE — 97112 NEUROMUSCULAR REEDUCATION: CPT

## 2023-02-24 PROCEDURE — 6370000000 HC RX 637 (ALT 250 FOR IP): Performed by: NURSE PRACTITIONER

## 2023-02-24 PROCEDURE — 6360000002 HC RX W HCPCS: Performed by: NURSE PRACTITIONER

## 2023-02-24 PROCEDURE — 2580000003 HC RX 258: Performed by: NURSE PRACTITIONER

## 2023-02-24 PROCEDURE — 99223 1ST HOSP IP/OBS HIGH 75: CPT | Performed by: INTERNAL MEDICINE

## 2023-02-24 PROCEDURE — 6360000002 HC RX W HCPCS: Performed by: INTERNAL MEDICINE

## 2023-02-24 PROCEDURE — 2700000000 HC OXYGEN THERAPY PER DAY

## 2023-02-24 PROCEDURE — 51702 INSERT TEMP BLADDER CATH: CPT

## 2023-02-24 PROCEDURE — 83735 ASSAY OF MAGNESIUM: CPT

## 2023-02-24 PROCEDURE — APPSS60 APP SPLIT SHARED TIME 46-60 MINUTES: Performed by: NURSE PRACTITIONER

## 2023-02-24 RX ADMIN — ASPIRIN 81 MG CHEWABLE TABLET 81 MG: 81 TABLET CHEWABLE at 09:50

## 2023-02-24 RX ADMIN — HEPARIN SODIUM 5000 UNITS: 5000 INJECTION INTRAVENOUS; SUBCUTANEOUS at 23:03

## 2023-02-24 RX ADMIN — HEPARIN SODIUM 5000 UNITS: 5000 INJECTION INTRAVENOUS; SUBCUTANEOUS at 14:28

## 2023-02-24 RX ADMIN — LINEZOLID 600 MG: 600 TABLET ORAL at 23:02

## 2023-02-24 RX ADMIN — FUROSEMIDE 80 MG: 10 INJECTION, SOLUTION INTRAMUSCULAR; INTRAVENOUS at 23:04

## 2023-02-24 RX ADMIN — HYDROMORPHONE HYDROCHLORIDE 0.25 MG: 1 INJECTION, SOLUTION INTRAMUSCULAR; INTRAVENOUS; SUBCUTANEOUS at 12:53

## 2023-02-24 RX ADMIN — FAMOTIDINE 20 MG: 20 TABLET ORAL at 09:50

## 2023-02-24 RX ADMIN — METRONIDAZOLE 500 MG: 250 TABLET ORAL at 14:28

## 2023-02-24 RX ADMIN — HEPARIN SODIUM 5000 UNITS: 5000 INJECTION INTRAVENOUS; SUBCUTANEOUS at 06:22

## 2023-02-24 RX ADMIN — COLLAGENASE SANTYL: 250 OINTMENT TOPICAL at 18:58

## 2023-02-24 RX ADMIN — LINEZOLID 600 MG: 600 TABLET ORAL at 09:52

## 2023-02-24 RX ADMIN — METRONIDAZOLE 500 MG: 250 TABLET ORAL at 23:02

## 2023-02-24 RX ADMIN — IPRATROPIUM BROMIDE AND ALBUTEROL SULFATE 1 AMPULE: 2.5; .5 SOLUTION RESPIRATORY (INHALATION) at 19:23

## 2023-02-24 RX ADMIN — LEVOFLOXACIN 750 MG: 500 TABLET, FILM COATED ORAL at 09:49

## 2023-02-24 RX ADMIN — IPRATROPIUM BROMIDE AND ALBUTEROL SULFATE 1 AMPULE: 2.5; .5 SOLUTION RESPIRATORY (INHALATION) at 15:20

## 2023-02-24 RX ADMIN — FUROSEMIDE 80 MG: 10 INJECTION, SOLUTION INTRAMUSCULAR; INTRAVENOUS at 09:49

## 2023-02-24 RX ADMIN — SODIUM HYPOCHLORITE: 1.25 SOLUTION TOPICAL at 18:58

## 2023-02-24 RX ADMIN — SILVER SULFADIAZINE: 10 CREAM TOPICAL at 18:58

## 2023-02-24 RX ADMIN — METRONIDAZOLE 500 MG: 250 TABLET ORAL at 06:22

## 2023-02-24 RX ADMIN — IPRATROPIUM BROMIDE AND ALBUTEROL SULFATE 1 AMPULE: 2.5; .5 SOLUTION RESPIRATORY (INHALATION) at 11:07

## 2023-02-24 RX ADMIN — FUROSEMIDE 80 MG: 10 INJECTION, SOLUTION INTRAMUSCULAR; INTRAVENOUS at 14:28

## 2023-02-24 RX ADMIN — IPRATROPIUM BROMIDE AND ALBUTEROL SULFATE 1 AMPULE: 2.5; .5 SOLUTION RESPIRATORY (INHALATION) at 07:16

## 2023-02-24 RX ADMIN — SODIUM CHLORIDE, PRESERVATIVE FREE 10 ML: 5 INJECTION INTRAVENOUS at 23:03

## 2023-02-24 ASSESSMENT — ENCOUNTER SYMPTOMS
DIARRHEA: 0
CHEST TIGHTNESS: 0
ABDOMINAL PAIN: 0
SINUS PRESSURE: 0
EYE PAIN: 0
COUGH: 0
SORE THROAT: 0
SHORTNESS OF BREATH: 0
EYE REDNESS: 0
WHEEZING: 0
VOMITING: 0
BACK PAIN: 0
SINUS PAIN: 0
EYE ITCHING: 0
NAUSEA: 0
CONSTIPATION: 0

## 2023-02-24 ASSESSMENT — PAIN DESCRIPTION - ORIENTATION: ORIENTATION: RIGHT

## 2023-02-24 ASSESSMENT — PAIN SCALES - GENERAL
PAINLEVEL_OUTOF10: 0
PAINLEVEL_OUTOF10: 7
PAINLEVEL_OUTOF10: 0

## 2023-02-24 ASSESSMENT — PAIN DESCRIPTION - LOCATION: LOCATION: LEG

## 2023-02-24 NOTE — PROGRESS NOTES
Comprehensive Nutrition Assessment    Type and Reason for Visit:  Reassess    Nutrition Recommendations/Plan:   Continue current diet, modification as per SLP  Continue current oral nutrition supplement, thickened as per order  Total assist with feeding      Malnutrition Assessment:  Malnutrition Status:  No malnutrition (02/13/23 1225)    Context:  Acute Illness       Nutrition Assessment:    SLP f/u today, recommends upgrade to moist and mince diet and continue nectar thick liquids, continue feed assist noted. Sub optimal po intake, consuming 1-50% of reecent meals with assistance. Pt working with therapy during visit. Will continue (thickened) oral supplement and follow as high nutrition risk. Nutrition Related Findings:    BUN 64, Cr 3.8, GFR 16, Glu 111, A1c 7.1   Wound Type: Multiple, Venous Stasis, Diabetic Ulcer       Current Nutrition Intake & Therapies:    Average Meal Intake: 26-50%, 1-25%  Average Supplements Intake: Unable to assess  ADULT ORAL NUTRITION SUPPLEMENT; Lunch, Dinner; Diabetic Oral Supplement  ADULT DIET; Dysphagia - Minced and Moist; Mildly Thick (Nectar)    Anthropometric Measures:  Height: 6' (182.9 cm)  Ideal Body Weight (IBW): 178 lbs (81 kg)    Admission Body Weight: 299 lb 4.8 oz (135.8 kg)  Current Body Weight: 264 lb 1.8 oz (119.8 kg), 158.3 % IBW.  Weight Source: Bed Scale  Current BMI (kg/m2): 35.8  Usual Body Weight: 261 lb 7 oz (118.6 kg) (11/13/22)  % Weight Change (Calculated): 14.2  Weight Adjustment For: Amputation, No Adjustment (toe amputation)                 BMI Categories: Obese Class 2 (BMI 35.0 -39.9)    Estimated Daily Nutrient Needs:  Energy Requirements Based On: Formula  Weight Used for Energy Requirements: Current  Energy (kcal/day): 2466  Weight Used for Protein Requirements: Ideal  Protein (g/day): 97  Method Used for Fluid Requirements: Standard Renal  Fluid (ml/day): 1200    Nutrition Diagnosis:   Inadequate protein-energy intake related to impaired nutrient utilization, increase demand for energy/nutrients as evidenced by intake 0-25%, intake 26-50%    Nutrition Interventions:   Food and/or Nutrient Delivery: Continue Current Diet, Continue Oral Nutrition Supplement  Nutrition Education/Counseling: No recommendation at this time  Coordination of Nutrition Care: Continue to monitor while inpatient, Feeding Assistance/Environment Change, Speech Therapy, Swallow Evaluation       Goals:  Previous Goal Met: Progressing toward Goal(s)  Goals: Meet at least 75% of estimated needs       Nutrition Monitoring and Evaluation:   Behavioral-Environmental Outcomes: None Identified  Food/Nutrient Intake Outcomes: Food and Nutrient Intake, Supplement Intake  Physical Signs/Symptoms Outcomes: Biochemical Data, Chewing or Swallowing, GI Status, Fluid Status or Edema, Meal Time Behavior, Skin, Weight    Discharge Planning:    Continue Oral Nutrition Supplement     Rosie Hagan, [address] [place] [address],   Contact: [phone]

## 2023-02-24 NOTE — PROGRESS NOTES
[place]  [facility] [place]      Name:  Magno Ro /Age/Sex: 1950  (67 y.o. male)   MRN & CSN:  [id number] & [id number] Encounter Date/Time: 2023 6:51 PM EST    Location:  -A PCP: Ivette Zambrano Day: 14    Assessment and Plan:   Magno Ro is a 67 y.o. male with pmh of  CAD, HFpEF, ICD, CKD, DM who presents with Acute respiratory failure with hypoxia and hypercapnia ([facility] [address]    Plan:  Acute hypoxic Hypercapneic respiratory failure with Pneumonia: was initially placed on BIPAP but later was intubated on MV was started on Empiric broad spectrum. CXR (): worsening R base opacity and small L pleural effusion. Now extubated (2023) and on 4L/min today. Wean O2 as able. On linezolid, levofloxacin and metronidazole. Cardiac arrest due to hypoxia: underwent CPR after pulse became thready after patient was reintubated when he was not being ventilated. Chest tube was also placed. Off dexmedetomidine   Acute renal failure with h/o CKD stage IV: was initially started on HD but could not tolerate. CRRT stopped. Follow up on nephro recs. Keep HD cath for now. On lasix 80mg IV TID per nephro. Unsure if he will need HD long term. Cr worsening to 3.3 today. Hyperkalemia-resolved: in the setting of above, improving  Elevated troponin: likely type 2 MI in the setting of respiratory as well as kidney failure, does have h/o CAD with multiple stents, Cardio on board. HFpEF:  EF 50-55% in . Repeat echo with simillar EF, hypokinetic RV with bowing of Interventricular septum towards LV. Chronic LE wounds: Wound care on board. Likely infected, Purulent drainage from RLE 1st metatarsal which is foul smelling. General surgery on consult, appreciate recs. Wound cultures sent - growing Pseudomonas aeruginosa, Citrobacter freundii MRSA, Enterococcus faecalis, Bacteroides thetaiotaomicron. On linezolid, levofloxacin, and metronidazole.   Infectious disease has been consulted and will defer antibiotic choices to them. Class II Obesity. BMI 35.82  Type II DM. On Lanus 5U HS. Diet ADULT ORAL NUTRITION SUPPLEMENT; Lunch, Dinner; Diabetic Oral Supplement  ADULT DIET; Dysphagia - Pureed; Mildly Thick (Nectar)   DVT Prophylaxis [] Lovenox, [x]  Heparin, [] SCDs, [] Ambulation,  [] Eliquis, [] Xarelto  [] Coumadin   Code Status Full Code   Disposition From: Home  Expected Disposition: TBD  Estimated Date of Discharge: 2-4 days  Patient requires continued admission due to Respiratory and renal failure. Pending ID consult   Surrogate Decision Maker/ POA      Subjective:     Chief Complaint: Respiratory Distress     Patient states that he is feeling okay today. His breathing is getting a little bit better. Still requiring oxygen. Unfortunately, he did have a bump of his creatinine at 3.8. We will continue IV diuresis while we continue to monitor. Review of Systems:    Review of Systems   Constitutional:  Negative for appetite change, chills, fatigue, fever and unexpected weight change. HENT:  Negative for sinus pressure, sinus pain and sore throat. Eyes:  Negative for pain, redness and itching. Respiratory:  Negative for cough, chest tightness, shortness of breath and wheezing. Cardiovascular:  Negative for chest pain, palpitations and leg swelling. Gastrointestinal:  Negative for abdominal pain, constipation, diarrhea, nausea and vomiting. Endocrine: Negative for polydipsia, polyphagia and polyuria. Genitourinary:  Negative for decreased urine volume, difficulty urinating, dysuria, frequency, hematuria and urgency. Musculoskeletal:  Positive for myalgias. Negative for arthralgias and back pain. Skin:  Positive for wound. Negative for pallor and rash. Neurological:  Negative for dizziness, tremors, seizures, syncope, weakness, light-headedness, numbness and headaches. Psychiatric/Behavioral:  Negative for agitation and confusion.         Objective: Intake/Output Summary (Last 24 hours) at 2/24/2023 1105  Last data filed at 2/24/2023 0558  Gross per 24 hour   Intake --   Output 1950 ml   Net -1950 ml          Vitals:   Vitals:    02/24/23 0949   BP: (!) 160/71   Pulse: 70   Resp: 15   Temp: 98.1 °F (36.7 °C)   SpO2: 92%       Physical Exam:   Physical Exam  Vitals and nursing note reviewed. Constitutional:       General: He is not in acute distress. Appearance: He is obese. He is not ill-appearing, toxic-appearing or diaphoretic. Interventions: Nasal cannula in place. Comments: 4L/min   HENT:      Head: Normocephalic and atraumatic. Right Ear: External ear normal.      Left Ear: External ear normal.      Nose: Nose normal.      Mouth/Throat:      Mouth: Mucous membranes are moist.      Pharynx: Oropharynx is clear. Eyes:      General: No scleral icterus. Right eye: No discharge. Left eye: No discharge. Conjunctiva/sclera: Conjunctivae normal.      Pupils: Pupils are equal, round, and reactive to light. Cardiovascular:      Rate and Rhythm: Normal rate and regular rhythm. Pulses: Normal pulses. Heart sounds: Normal heart sounds. No murmur heard. No friction rub. No gallop. Pulmonary:      Effort: Pulmonary effort is normal. No respiratory distress. Breath sounds: Normal breath sounds. Decreased air movement and transmitted upper airway sounds (Improved) present. No stridor. No decreased breath sounds, wheezing, rhonchi or rales. Abdominal:      General: Bowel sounds are normal. There is no distension. Palpations: Abdomen is soft. There is no mass. Tenderness: There is no abdominal tenderness. There is no guarding or rebound. Hernia: No hernia is present. Musculoskeletal:      Right lower leg: No edema. Left lower leg: No edema. Comments: B/L LE bandaged   Skin:     Capillary Refill: Capillary refill takes less than 2 seconds.    Neurological:      Mental Status: He is alert and oriented to person, place, and time.          Medications:   Medications:    levoFLOXacin  750 mg Oral Every Other Day    metroNIDAZOLE  500 mg Oral 3 times per day    linezolid  600 mg Oral 2 times per day    silver sulfADIAZINE   Topical Daily    insulin glargine  5 Units SubCUTAneous Nightly    furosemide  80 mg IntraVENous TID    famotidine  20 mg Per NG tube Daily    heparin (porcine)  5,000 Units SubCUTAneous 3 times per day    sodium hypochlorite   Irrigation Daily    collagenase   Topical Daily    sodium chloride flush  5-40 mL IntraVENous 2 times per day    aspirin  81 mg Oral Daily    [Held by provider] clopidogrel  75 mg Oral Daily    ipratropium-albuterol  1 ampule Inhalation Q4H WA      Infusions:    dextrose      sodium chloride 10 mL/hr at 02/21/23 0644     PRN Meds: HYDROmorphone, 0.25 mg, Q4H PRN  albuterol, 2.5 mg, Q2H PRN  heparin flush, 240 Units, PRN  glucose, 4 tablet, PRN  dextrose bolus, 125 mL, PRN   Or  dextrose bolus, 250 mL, PRN  glucagon (rDNA), 1 mg, PRN  dextrose, , Continuous PRN  sodium chloride flush, 10 mL, PRN  sodium chloride, , PRN      Labs      Recent Results (from the past 24 hour(s))   POCT Glucose    Collection Time: 02/23/23 11:31 AM   Result Value Ref Range    POC Glucose 99 70 - 99 MG/DL   POCT Glucose    Collection Time: 02/23/23  3:49 PM   Result Value Ref Range    POC Glucose 96 70 - 99 MG/DL   POCT Glucose    Collection Time: 02/23/23  8:21 PM   Result Value Ref Range    POC Glucose 94 70 - 99 MG/DL   Comprehensive Metabolic Panel    Collection Time: 02/24/23  6:15 AM   Result Value Ref Range    Sodium 140 135 - 145 MMOL/L    Potassium 4.3 3.5 - 5.1 MMOL/L    Chloride 102 99 - 110 mMol/L    CO2 29 21 - 32 MMOL/L    BUN 64 (H) 6 - 23 MG/DL    Creatinine 3.8 (H) 0.9 - 1.3 MG/DL    Est, Glom Filt Rate 16 (L) >60 mL/min/1.73m2    Glucose 77 70 - 99 MG/DL    Calcium 8.3 8.3 - 10.6 MG/DL    Albumin 2.7 (L) 3.4 - 5.0 GM/DL    Total Protein 6.1 (L) 6.4 - 8.2 GM/DL    Total Bilirubin 0.5 0.0 - 1.0 MG/DL    ALT 6 (L) 10 - 40 U/L    AST 11 (L) 15 - 37 IU/L    Alkaline Phosphatase 60 40 - 128 IU/L    Anion Gap 9 4 - 16   Magnesium    Collection Time: 02/24/23  6:15 AM   Result Value Ref Range    Magnesium 2.1 1.8 - 2.4 mg/dl   Phosphorus    Collection Time: 02/24/23  6:15 AM   Result Value Ref Range    Phosphorus 4.1 2.5 - 4.9 MG/DL   CBC with Auto Differential    Collection Time: 02/24/23  6:15 AM   Result Value Ref Range    WBC 5.4 4.0 - 10.5 K/CU MM    RBC 3.01 (L) 4.6 - 6.2 M/CU MM    Hemoglobin 7.9 (L) 13.5 - 18.0 GM/DL    Hematocrit 27.0 (L) 42 - 52 %    MCV 89.7 78 - 100 FL    MCH 26.2 (L) 27 - 31 PG    MCHC 29.3 (L) 32.0 - 36.0 %    RDW 18.8 (H) 11.7 - 14.9 %    Platelets 764 238 - 459 K/CU MM    MPV 9.2 7.5 - 11.1 FL    Differential Type AUTOMATED DIFFERENTIAL     Segs Relative 71.5 (H) 36 - 66 %    Lymphocytes % 16.8 (L) 24 - 44 %    Monocytes % 8.2 (H) 0 - 4 %    Eosinophils % 2.4 0 - 3 %    Basophils % 0.9 0 - 1 %    Segs Absolute 3.8 K/CU MM    Lymphocytes Absolute 0.9 K/CU MM    Monocytes Absolute 0.4 K/CU MM    Eosinophils Absolute 0.1 K/CU MM    Basophils Absolute 0.1 K/CU MM    Nucleated RBC % 0.0 %    Total Nucleated RBC 0.0 K/CU MM    TRC 0.0698 K/CU MM    Total Immature Neutrophil 0.01 K/CU MM    Immature Neutrophil % 0.2 0 - 0.43 %        Imaging/Diagnostics Last 24 Hours   XR CHEST PORTABLE    Result Date: 2/11/2023  EXAMINATION: ONE XRAY VIEW OF THE CHEST 2/11/2023 7:29 pm COMPARISON: Chest radiograph 02/11/2023 HISTORY: ORDERING SYSTEM PROVIDED HISTORY: ETT placement TECHNOLOGIST PROVIDED HISTORY: Reason for exam:->ETT placement Reason for Exam: et and og tube placement confirmation Additional signs and symptoms: et and og tube placement confrimation FINDINGS: Lines and tubes: -ETT terminates at the superior margin of the clavicles.  -enteric tube takes a the subdiaphragmatic course and terminates out of the field of view -right-sided Cordis catheter, which terminates within the mid/upper SVC Lungs: There is indistinctness of the pulmonary vasculature with patchy opacities within the right greater than left lungs. . Pleura: Small right-sided pleural effusion. Cardiomediastinal silhouette: Enlarged cardiac silhouette. Bones: No acute osseous findings. Soft tissues: Left-sided 2 lead cardiac device. Lines and tubes as above. The ETT terminates at the level of the superior margin of the clavicles. Grossly unchanged pulmonary exam.  Findings favor cardiogenic pulmonary edema. However a superimposed infectious process cannot be excluded.        Electronically signed by Tom Kraus MD on 2/24/2023 at 11:05 AM

## 2023-02-24 NOTE — PROGRESS NOTES
02/24/23 0945   Encounter Summary   Encounter Overview/Reason  Spiritual/Emotional Needs   Service Provided For: Patient and family together   Referral/Consult From: [address] Children;Family members   Last Encounter  02/24/23  (Offered prayer and spiritul support. family in the room [place] visited yesterday from [place].)   Complexity of Encounter Low   Begin Time 0940   End Time  1896   Total Time Calculated 6 min   Encounter    Type Follow up   Spiritual/Emotional needs   Type Spiritual Support   Assessment/Intervention/Outcome   Assessment Calm;Peaceful   Intervention Active listening;Discussed belief system/Denominational practices/forrest;Discussed illness injury and its impact; Discussed relationship with God;Empowerment;Nurtured Hope;Prayer (assurance of)/[place];Sustaining Presence/Ministry of presence   Outcome Engaged in conversation;Expressed feelings of [place], Peace and/or Love;Expressed Gratitude   Plan and Referrals   Plan/Referrals No future visits requested  (as needed)

## 2023-02-24 NOTE — CARE COORDINATION
CM reviewed chart and discussed in IDR. Pt is not medically ready. Possible discharge on Monday. Pt will need an order for [facility]HC. [facility] is on board.

## 2023-02-24 NOTE — PROGRESS NOTES
Nephrology Progress Note  2/24/2023 7:05 AM        Subjective:   Admit Date: 2/11/2023  PCP: Aurora Bacon    Interval History: Mr. Rere Boss was transferred to ICU stepdown  He is alert awake and oriented  No major event    Diet: He is tolerating some liquid diet    ROS: No overt confusion  Made about almost 2 L of the last 24 hours of IV loop  No fever and acceptable blood pressure  He still requiring 2 L per nasal cannula acceptable oxygen    Data:     Current meds:    levoFLOXacin  750 mg Oral Every Other Day    metroNIDAZOLE  500 mg Oral 3 times per day    linezolid  600 mg Oral 2 times per day    silver sulfADIAZINE   Topical Daily    insulin glargine  5 Units SubCUTAneous Nightly    furosemide  80 mg IntraVENous TID    famotidine  20 mg Per NG tube Daily    heparin (porcine)  5,000 Units SubCUTAneous 3 times per day    sodium hypochlorite   Irrigation Daily    collagenase   Topical Daily    sodium chloride flush  5-40 mL IntraVENous 2 times per day    aspirin  81 mg Oral Daily    [Held by provider] clopidogrel  75 mg Oral Daily    ipratropium-albuterol  1 ampule Inhalation Q4H WA      dextrose      sodium chloride 10 mL/hr at 02/21/23 0644         I/O last 3 completed shifts:   In: 676.7 [IV Piggyback:676.7]  Out: 2255 [Urine:2255]    CBC:   Recent Labs     02/22/23  0509 02/24/23  0615   WBC 5.0 5.4   HGB 7.8* 7.9*    206          Recent Labs     02/22/23  0509 02/23/23  0515 02/24/23  0615    136 140   K 4.3 4.3 4.3   CL 96* 98* 102   CO2 29 30 29   BUN 63* 67* 64*   CREATININE 3.1* 3.3* 3.8*   GLUCOSE 174* 116* 77       Lab Results   Component Value Date    CALCIUM 8.3 02/24/2023    PHOS 4.1 02/24/2023       Objective:     Vitals: BP (!) 151/64   Pulse 64   Temp 98.5 °F (36.9 °C) (Oral)   Resp 11   Ht 6' (1.829 m)   Wt 264 lb 1.8 oz (119.8 kg)   SpO2 98%   BMI 35.82 kg/m² ,    General appearance: Alert, awake and oriented  HEENT: At least 1+ conjunctival pallor  Neck: Supple, right IJ temporary dialysis catheter, supplemental oxygen per nasal cannula  Lungs: Positive admissions breath sound-crackles bilaterally  Heart: Regular rate and rhythm, left chest wall implanted cardiac device  Abdomen: Soft, nontender  Extremities: Bilateral leg edema  He has a Fuller catheter      Problem List :         Impression :     Stage III acute kidney disease on top of CKD stage IV A3-nonoliguric BUN/creatinine still up-hopefully will plan to  Acute decompensated heart failure/fluid overload recent respiratory failure from septic shock-getting better  Multiple underlying chronic condition    Recommendation/Plan  :     Since he has pulmonary edema we have no option but to continue IV loop-despite increasing BUN/creatinine-at least for today-we will check a chest x-ray tomorrow morning-if improvement will de-escalate the diuretics-enhance oral intake-watch for iatrogenic nosocomial complication-his urine showed no active sediment but 1.4 g of proteinuria-also has some RBC and WBC but this is a Fuller specimen-unlikely acute interstitial nephritis-but I would revisit the duration of antimicrobial agent-bottom line is avoid any nonessential medication-to reduce the toxicity as clinically appropriate-follow clinically and biochemically      Alberto Jennings MD MD

## 2023-02-24 NOTE — PROGRESS NOTES
[id number] [facility] SPEECH/LANGUAGE PATHOLOGY  DAILY PROGRESS NOTE  Caitlyn Mcmillan  2/24/2023  [id number]  Hyperkalemia [E87.5]  Elevated troponin [R77.8]  Acidosis, metabolic, with respiratory acidosis [E87.4]  Acute respiratory failure with hypoxia and hypercapnia (HCC) [J96.01, J96.02]  Acute on chronic heart failure, unspecified heart failure type (HCC) [I50.9]  Acute kidney injury superimposed on CKD ([facility] [address] [N17.9, N18.9]  Acute kidney injury superimposed on chronic kidney disease ([facility] [address] [N17.9, N18.9]  Allergies   Allergen Reactions    Pcn [Penicillins] Hives    Fentanyl Itching         Pt was seen this date for dysphagia treatment. IMPRESSION AND RECOMMENDATIONS:   Caitlyn Mcmillan was seen for swallow treatment seated upright in bed, pt was alert, cooperative, pleasant. Pt with noted decreased coughing and throat clearing this day and prior to po trials. Pt seen with PO trials of nectar via straw, puree, thins via cup, and soft solids. Oral phase was mildly impaired characterized by intact labial seal, oral holding of puree and liquids, slightly delayed AP transfer, and adequate oral clearance. Pt with no s/s of aspiration noted across all trials. Pt with noted audible/uncoordinated swallow with thins via cup. Education provided to son regarding diet levels, POC, recommendations, and aspiration precautions. Recommend upgrade to moist and mince diet and continue nectar thick liquids. Recommend continue feed assist. Recommend pills crushed in puree as able. SLP will continue to follow for monitoring of diet tolerance and po trials of advanced diet. Results/recommendations d/w pt, pt's son, and nurse.        GOALS (current status in bold):  Short-term Goals  Timeframe for Short-term Goals: Length of stay  Goal 1: Pt will tolerate soft and bite-sized diet and nectar thick liquids with no s/s of aspiration moist and minced and tolerating NTL  Goal 2: Pt will tolerate PO trials of advanced diet with no s/s of aspiration progressing, cont. Goal 3: Pt/caregivers will demonstrate understanding of recommendations and POC progressing, cont.                             EDUCATION: results/recommendation and POC    PAIN RATING (0-10 Scale): n/a  Time in/Time out: SLP Individual Minutes  Minutes: 20    Visit number: [id number] Jeyson Cisneros MS [facility]-SLP   2/24/2023  12:24 PM

## 2023-02-24 NOTE — PROGRESS NOTES
Physical Therapy    Physical Therapy Treatment Note  Name: Lolita Kessler MRN: [id number] :   1950   Date:  2023   Admission Date: 2023 Room:  -A   Restrictions/Precautions:  fall risk ; general precautions; contact  Communication with other providers:  RN clears for participation ; RN handoff  Subjective:  Patient states:  \"I'm scared you guys are gonna hurt me\"  Pain:   Location, Type, Intensity (0/10 to 10/10):  endorses pain that he does not rate or locate  Objective:    Observation:  Supine, awake, confused pleasantly, agreeable. He is moving   Treatment, including education/measures:  Therapeutic Activity Training:   Therapeutic activity training was instructed today. Cues were given for safety, sequence, UE/LE placement, awareness, and balance. Activities performed today included bed mobility training, sup-sit, sit-stand, SPT. Supine <-> sit: max A x 2  Seated balance: initial mod A progressing to SBA for short periods (~2 mins) before regressing to min - mod A ; seated ~ 10 mins for performance of neuro re-ed  Sit <-> supine: max A x 2  Scooting: max A x 2 to [facility]  Positioned for comfort and pressure relief ; all needs met, left in bed, call light in reach    Neuro-Muscular re-education:  Cues were given for position, posture, kinesthetic sense, safety, recruitment, and rationale. Cues were verbal and/or tactile. Seated EOB for ~ 10 mins to progress seated tolerance and NMR for posture and core musculature in static w/ single UE support and supported  Multidirectional reaching outside MARGOTH, inc time and effort requiring cues for redirection and attention to balance     Assessment / Impression:    Pt is tolerating w/ improved quality and demonstrating improved balance.  He is able to perform light dynamic reaching and prolonged time at EOB w/ light assist ; BL LE (L > R) weakness impacting ability to safely complete OOB, he's unwilling to attempt this session, will cont to progress as pt is willing/agreeable.     Patient's tolerance of treatment:  fair   Barriers to improvement:  cognition; pain; endurance; strength  Plan for Next Session:    Cont w/ est DC plan (SNF)  Progress balance, functional mobility, transfer training w/ eduin-steady and assist of 2, LE strength    Time in:  1117  Time out:  1146  Timed treatment minutes:  27  Total treatment time:  31    Previously filed items:  Social/Functional History  Lives With: Family, Daughter  Type of Home: House  Home Layout: Two level  Home Access: Ramped entrance  Bathroom Shower/Tub: Shower chair without back, Tub/Shower unit  Bathroom Equipment: Shower chair  Bathroom Accessibility: Not accessible  Home Equipment: Traci Haroon, Electric scooter, Walker, standard, Hospital bed  [employer] Help From: Family  ADL Assistance: Needs assistance  Bath: Independent  Dressing: Independent  Grooming: Independent  Feeding: Independent  Toileting: Independent  Homemaking Assistance: Needs assistance  Ambulation Assistance: Needs assistance  Transfer Assistance: Needs assistance  Active : No  Mode of Transportation: Car  Occupation: Retired, On disability  Patient Goals   Patient Goals : return to [address] [place] [address]  Time Frame for Short Term Goals: 1 week  Short Term Goal 1: pt will complete bed mobility at [address] [place] [address] 2: pt will demonstrate fair - seated balance w/ min posterior support during participation of EOB  Short Term Goal 3: pt will demonstrate 3+/5 BLE strength  Short Term Goal 4: pt will demonstrate 20 mins of seated tolerance for performance at [facility]    Electronically signed by:    Adam Theodore PT, DPT  2/24/2023, 2:05 PM

## 2023-02-24 NOTE — PROGRESS NOTES
Occupational Therapy     Occupational Therapy Treatment Note     Name: Helen Bernstein MRN: [id number] :             1950   Date:  2023   Admission Date: 2023 Room:  -A      Primary Problem:  The primary encounter diagnosis was Acute respiratory failure with hypoxia and hypercapnia ([facility] Diagnoses of Acute kidney injury superimposed on chronic kidney disease ([facility] Hyperkalemia, Acute on chronic heart failure, unspecified heart failure type ([facility] Elevated troponin, and Acidosis, metabolic, with respiratory acidosis were also pertinent to this visit. Restrictions/Precautions:  Contact Precautions, General Precautions, Fall Risk, oxygen, parkinson, chest tube     Communication with other providers: RN approved session, Co tx with PT Earl for safety and mobility     Subjective:  Patient states:  Pt agreeable to therapy session. Pain:    Location, Type, Intensity (0/10 to 10/10):  Pt denies pain     Objective:    Observation: Pt supine in bed upon arrival.  Objective Measures:  Pt alert to self and place and stated 2014 for year despite multiple choice. Self Care Training:   Cues were given for safety, sequence, UE/LE placement, visual cues, and balance. Activities performed today included feeding. Pt supine in bed prior to arrival and completed sup to sit with head of bed elevated and MAX A x2. Pt required increased time and encouragement to complete task. Pt noted with decreased ability to utilize bed rails with LUE. Pt seated edge of bed approx 8-12 minutes with Close SBA to MOD A x1. Pt completed c/o dizziness sitting edge of bed and vitals assessed and appeared within normal limits. Pt seated edge of bed  and completed self feeding with set up and stand by assist.  Pt returned supine in bed with MAX A X 2 and boosted in bed with DEP A x2. Pt positioned in bed with pillows. Pt supine in bed with all needs met and call light in reach.      Assessment / Impression:    Patient's tolerance of treatment: fair-   Adverse Reaction: None  Significant change in status and impact:   Barriers to improvement: None noted     Plan for Next Session:    Continue OT POC and progress sitting edge of bed in progress sitting balance targeting simple ADLs.      Time in:  1117  Time out:  1146  Timed treatment minutes:  29  Total treatment time:  29     Electronically signed by:    BETINA Rodriguez,   2/24/2023, 12:35 PM

## 2023-02-24 NOTE — CONSULTS
Infectious Disease Consult Note  2023   Patient Name: Timmy Walden : 1950   Impression  Septic Shock (Resolved) Secondary to Multi-Factioral:  Nonhealing Right Foot Wound with Polymicrobial Infection with Probable OM:  Multi-Focal Pneumonia Complicated by Acute Hypoxic Respiratory Failure (Intubated/ Mech Vent, Extubated 23): Allergy to PCN (Hives): Tolerates cephalosporins and carbapenems   Concern for DVT Left Hand:  Afebrile, no leukocytosis  CrCl 23  Pct and CRP Pending  Hypoalbuminemia:  -Right hallux culture: Pseudomonas aeruginosa, Citrobacter freundii, MRSA, E.faecalis, and Bacteroides thetaiotaomicron  Dr. Cinthia Maloney, general surgery, [facility] with dakins, Santyl, and silvadene to BLE. CKD3:  Dr. Ladi Pena onboard  BLE Chronic Wounds Secondary to PAD:  DMII:  CIED Placement :  CAD s/p PCI/ HTN/ HFpEF/ Cardiomyopathy:  Dr. Nakul Hooks onboard  STACI:  Morbid Obesity:  BMI: 35.82  Multi-morbidity: per PMHx:  GERD, arthritis, past CVA  Plan:  Continue po Levaquin 750 mg q24h (covering P.aeruginosa and Citrobacter) x 14 days (end date 3/8/2023)  Continue po linezolid 600 mg bid (covering MRSA and E.faecalis) x 14 days (end date 3/2/2023)  Continue po metronidazole 500 mg tid (covering Bacteroides) x 14 days (end date 3/8/2023)  Trend CRP and Pct, ordered  Ordered RUE venous doppler to eval for DVT  Will sign off and not follow the patient actively, please reconsult as needed. Thank you for allowing me to consult in the care of this patient.  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  -American male with a history of CKD3, DMII, GERD, obesity, arthritis, CAD s/p PCI, HFpEF, with CIED 2010, cardiomyopathy, chronic back pain, HTN, PAD, STACI, past CVA, chronic BLE wounds and allergy to PCN (hives) who was admitted 2023 for further evaluation and management of respiratory distress and near unresponsiveness upon EMS arrival.  Hypoxic in the 70s, he was given BiPAP and worsened, was intubated and mechanically ventilated on 2/11 with vasopressor (Levophed) support (x 2 days) and then extubated on 2/20/23. His CXR revealed HF and multi-focal pneumonia. He was started on IV empiric ABX therapy of ceftriaxone and azithromycin, then transitioned cefepime and linezolid when 2/14 foot wound cultures grew Pseudomonas aeruginosa, Citrobacter freundii, MRSA, E.faecalis and Bacteroides. Resp culture from 2/11/23 grew normal respiratory arcenio. He was then transitioned from cefepime to levofloxacin and Flagyl, and continued on linezolid on 2/22/23. He was hospitalized in November, 2022 with Enterococcus faecalis bacteremia, probable right foot OM with nonhealing wound. He was given per Dr. Imelda Pike a 6 week course of meropenem 1 gm q8h (ended on 12/24/2022). ? Infectious diseases service was consulted to evaluate the pt, and recommend further investigative and therapeutic measures. ROS: Other systems reviewed Including eyes, ENT, respiratory, cardiovascular, GI, , dermatologic, neurologic, psych, hem/lymphatic, musculoskeletal and endocrine were negative other than what is mentioned above.      Patient Active Problem List    Diagnosis Date Noted    Precordial pain 07/18/2018    Acute chest pain     Other seizures ([address] 02/14/2023    Diabetic foot ([address] 02/13/2023    Diabetic ulcer of midfoot associated with diabetes mellitus due to underlying condition, with muscle involvement without evidence of necrosis ([address] 02/13/2023    Acute respiratory failure with hypoxia and hypercapnia ([address] 02/11/2023    Acute kidney injury superimposed on CKD ([address] 02/11/2023    Bacteremia due to Enterococcus 11/14/2022    Skin ulcer of left foot including toes with fat layer exposed ([address] 11/12/2022    Superficial incisional surgical site infection 11/12/2022    Acute on chronic diastolic heart failure ([address] 11/11/2022    Acute on chronic diastolic CHF (congestive heart failure) ([address] 10/14/2022    Leg swelling 10/14/2022    Heart failure exacerbated by sotalol ([address] 10/12/2022    CHF (congestive heart failure), NYHA class I, acute on chronic, combined ([address] 10/12/2022    Cellulitis of foot 07/26/2022    Toe osteomyelitis ([address] 07/26/2022    Diabetic ulcer of right foot due to type 2 diabetes mellitus ([address] 07/25/2022    Acute on chronic HFrEF (heart failure with reduced ejection fraction) ([address] 01/22/2022    Scrotal edema 01/22/2022    Hypertensive urgency 01/22/2022    WD-Chronic foot ulcer, left, with necrosis of bone ([address] 11/12/2021    Acute on chronic respiratory failure with hypoxemia ([address] 10/29/2021    Receiving intravenous antibiotic treatment as outpatient 07/18/2021    Persistent wound pain     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     WD-Diabetic ulcer of toe of right foot associated with type 2 diabetes mellitus, with fat layer exposed ([address] 06/18/2021    Diabetic ulcer of toe associated with type 2 diabetes mellitus, with bone involvement without evidence of necrosis ([address] 06/18/2021    Long term (current) use of antibiotics 06/14/2021    Moderate malnutrition ([address] 06/08/2021    Visit for wound check     Diabetic foot ulcer with osteomyelitis ([address] 06/07/2021    Acute on chronic systolic CHF (congestive heart failure) ([address] 12/21/2020    Leg edema     Acute on chronic congestive heart failure ([address] 12/10/2020    Epigastric pain 08/12/2020    HTN (hypertension) 05/20/2019    Diabetic neuropathy ([address] 05/02/2019    Chronic kidney disease (CKD) stage G3a/A3, moderately decreased glomerular filtration rate (GFR) between 45-59 mL/min/1.73 square meter and albuminuria creatinine ratio greater than 300 mg/g ([address] 01/29/2019    Cardiomyopathy ([facility] 01/29/2019    Unstable angina ([facility] 11/19/2018    Acute kidney injury ([facility] 02/09/2018    Fluid overload 02/09/2018    DM (diabetes mellitus) ([facility] 02/09/2018    Ischemia of toe     Hyperkalemia 01/09/2018    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[address]     ICD (implantable cardioverter-defibrillator) battery depletion 10/11/2017    Chronic kidney disease (CKD) stage G3a/A2, moderately decreased glomerular filtration rate (GFR) between 45-59 mL/min/1.73 square meter and albuminuria creatinine ratio between  mg/g (HCC) 10/06/2017    Edema 10/06/2017    Diabetic foot infection ([address]     Toe gangrene ([address] 07/26/2017    Necrotic toes ([address] 07/06/2017    WD-PVD (peripheral vascular disease) ([facility]     Limb ischemia     Microalbuminuria 01/22/2017    Tubulovillous adenoma of colon 10/20/2016    S/P partial colectomy 08/27/2016    Chronic coronary artery disease 05/31/2016    Chronic kidney disease, stage III (moderate) ([address] 05/31/2016    Sick sinus syndrome ([address] 05/31/2016    Type 2 diabetes mellitus with diabetic polyneuropathy ([address] 05/31/2016    Spinal stenosis of lumbar region 05/31/2016    Obesity, Class I, BMI 30-34.9 05/31/2016    Chronic combined systolic and diastolic heart failure ([address] 03/03/2016    Biventricular ICD (implantable cardioverter-defibrillator) in place 09/03/2015    Mixed hyperlipidemia 04/13/2015    PAD (peripheral artery disease) ([address] 07/24/2012     Past Medical History:   Diagnosis Date    Acid reflux     Acute MI ([address] 2004, 2008    Arthritis     Back    Broken teeth     Upper Front    CAD (coronary artery disease)     Sees Dr. Hannah Lozoya [facility])     per old chart    Cerebral artery occlusion with cerebral infarction ([address]     CHF (congestive heart failure) ([address]     preserved ejection fraction    Chronic back pain     Chronic kidney disease     Stage IV - patient of Dr Katrina Elliott    Diabetes mellitus [facility]) Dx 1965    per old chart pt has been diabetic since age 13    Diabetic neuropathy ([address]     \"in my feet\"    H/O cardiovascular stress test 08/25/2016    H/O Doppler ultrasound 09/27/2018    Moderate disease of the right lower extremity with an JALEN of 0.72. Moderate to severe disease of the left lower extremity with an JALEN of 0.55.     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    Neuropathy     both feet    PAD (peripheral artery disease) ([address] 09/27/2018    PVD (peripheral vascular disease) ([address]     Sick sinus syndrome (HCC)     Sleep apnea     \"sleep study 3 yrs ago- could not tolerate the cpap made me too dry\"    Spinal stenosis     Teeth missing     Upper And Lower    Type 2 diabetes mellitus without complication ([address]     WD-Chronic foot ulcer, left, with necrosis of bone ([address] 11/12/2021      Past Surgical History:   Procedure Laterality Date    CARDIAC CATHETERIZATION      per old chart done 10/2014    CARDIAC CATHETERIZATION  07/14/2017    with angiography of leg    CARDIAC CATHETERIZATION  11/20/2018    PATENT STENTS OF ALL THREE MAJOR VESSELS    CARDIAC DEFIBRILLATOR PLACEMENT  06/04/2010    [employer] Secura DR Defibrillator Implanted    COLECTOMY Right 08/26/2016    laparascopic; robotic assisted    COLONOSCOPY  08/04/2016    CORONARY ANGIOPLASTY      \"15 Heart Stents\"    CORONARY ANGIOPLASTY WITH STENT PLACEMENT      per old chart had angio with stent to circumflex and obtuse marginal artery at [facility] 5/2010( old chart also gives hx of stent placement done 2000,2004 and 2005)    DENTAL SURGERY      Teeth Extracted In Past    IR TUNNELED CATHETER PLACEMENT GREATER THAN 5 YEARS  6/14/2021    IR TUNNELED CATHETER PLACEMENT GREATER THAN 5 YEARS [facility]14[facility]20[facility]    IR TUNNELED CATHETER PLACEMENT GREATER THAN 5 YEARS  11/17/2022    IR TUNNELED CATHETER PLACEMENT GREATER THAN 5 YEARS 11/17/2022 [facility] SPECIAL PROCEDURES    PACEMAKER PLACEMENT  06/04/2010    [employer]onic Secura DR Defibrillator Implanted    TOE AMPUTATION Right 09/12/2017    Rt 3rd toe    TOE AMPUTATION Right 01/09/2018     Right 5th toe amputation and Toenails trimmed left 2,3,4 and 5th toes    TOE AMPUTATION Left 12/26/2020    LEFT GREAT TOE AMPUTATION performed by Kris Bach MD at [address] Left 7/26/2022    LEFT SECOND TOE AMPUTATION performed by Bharath Weber MD at [address] Right 10/20/2022    Right First TOE AMPUTATION performed by Bharath Weber MD at [address] [place] [address] Ave      per old chart had balloon angioplasty right superfical femoral artery,right popliteal artery,,right ant.tibial artery, right tibioperoneal trunk, and right post.tibial artery wna stent placement right popliteal artery and superfical femoral artery 7/2012      Family History   Problem Relation Age of Onset    Diabetes Mother     Stroke Mother     High Blood Pressure Mother     Vision Loss Mother     Cancer Father         Prostate Cancer    Diabetes Sister     Neuropathy Sister     Other Sister         \"Breathing Problems\"    Heart Disease Sister     Early Death Sister 62        Heart Complications    Cancer Brother         \"Stomach Cancer\"    High Blood Pressure Brother     Diabetes Brother     Heart Disease Brother     High Blood Pressure Brother     Cancer Son         \"Testicle Cancer\"      Infectious disease related family history - not contibutory. SOCIAL HISTORY  Social History     Tobacco Use    Smoking status: Some Days     Types: Cigars    Smokeless tobacco: Never    Tobacco comments:     Client states he has stopped smoking   Substance Use Topics    Alcohol use: No      Born:[place], [place]  Lives:[place] with family  Occupation:Retired   No recent travel of significance. No recent unusual exposures.   Pets: outside dogs and cats ? ALLERGIES  Allergies   Allergen Reactions    Pcn [Penicillins] Hives    Fentanyl Itching      MEDICATIONS  Reviewed and are per the chart/EMR. ? Antibiotics:   Present:  Levofloxacin 2/22-  Linezolid 2/16-  Flagyl 2/22-  Past:  Cefepime 2/16-2/21  Azithromycin 2/11-12? Ceftriaxone 2/11-12  -------------------------------------------------------------------------------------------------------------------    Vital Signs:  Vitals:    02/24/23 0949   BP: (!) 160/71   Pulse: 70   Resp: 15   Temp: 98.1 °F (36.7 °C)   SpO2: 92%         Exam:    VS: noted; wt 264 lb (119.8 kg) Height 6'  Gen: alert, pleasantly confused, no distress  Skin: no stigmata of endocarditis  Wounds: C/D/I right lateral great toe/foot with erythema, warmth, no drainage. Bilateral lower feet with DSD. Scattered dry/leathery skin on BLE. Left hand with edema, non-pitting  HEMT: AT/NC Oropharynx pink, moist, and without lesions or exudates; dentition in good state of repair  Eyes: PERRLA, EOMI, conjunctiva pink, sclera anicteric. Neck: Supple. Trachea midline. No LAD. Chest: no distress and CTA. Anterior breath sounds diminished with inspiratory wheezes. Oxygen per NC  Heart: NSR and no MRG. Abd: soft, non-distended, no tenderness, no hepatomegaly. Normoactive bowel sounds. Ext: no clubbing, cyanosis, or edema  Catheter Site: without erythema or tenderness draining clear yellow urine. Neuro: CN 2-12 intact and no focal sensory or motor deficits    ? Diagnostic Studies: reviewed  2/11/2023 XR Chest Portable;  Impression   Findings consistent with congestive heart failure and/or bilateral   mid-basilar pneumonia. Small bibasilar pleural effusions suggested. Findings are generally worse on the right.     2/11/2023 XR Chest Portable;  Impression   Lines and tubes as above. The ETT terminates at the level of the superior   margin of the clavicles. Grossly unchanged pulmonary exam.  Findings favor cardiogenic pulmonary   edema. However a superimposed infectious process cannot be excluded. 2/13/2023 CT head WO Contrast:  Impression   No acute intracranial abnormality. 2/13/2023 CT Chest Abdomen Pelvis WO Contrast:  Impression   1. Moderate right pleural effusion with severe atelectasis in the right lung. 2. Small left pleural effusion with moderate left lower lobe atelectasis. 3. Moderate amount of abdominal and pelvic ascites. 4. Otherwise no acute abnormality in the abdomen or pelvis. Incidental   cholelithiasis. 2/16/2023 XR Chest 1 View:  Impression   Mild improved moderate-to-large right pleural effusion with right basilar   consolidation compared to prior CT on 02/13/2023.     2/16/2023 XR Chest Portable:  Impression   Mildly improved moderate right pleural effusion and right mid to lower lobe   consolidation. Unremarkable appearing endotracheal tube. 2/16/2023 XR Chest Portable:  Impression   1. Endotracheal tube tip projects 4.5 cm from the chayo. 2. Cardiomegaly with central pulmonary vascular congestion and layering right   pleural effusion. 2/17/2023 XR Chest Portable:  Impression   1. No significant change life support appliances. 2. Improved aeration right lower lobe. 2/19/2023 XR Chest Portable:  Impression   1. Stable lines, tubes and support devices. 2. Worsening right base opacity. 3. Small left pleural effusion. 2/22/2023 XR Chest 1 View:  Impression   1. Questionable trace right apical pneumothorax. 2. Pulmonary vascular congestion with suspected central predominant edema,   suggesting congestive heart failure given mild cardiomegaly and suspected   trace bilateral pleural effusions. Pneumonia could appear similar. 3. Persistent bibasilar airspace opacities appearing unchanged on the left   and decreased on the right likely due in part to passive atelectasis with   superimposed pneumonia and aspiration not excluded.      2/23/2023 XR Chest 1 View:  Impression Similar-appearing prominence the cardiac silhouette with persistent vascular   congestion and bibasilar opacities. ??  I have examined this patient and available medical records on this date and have made the above observations, conclusions and recommendations. Electronically signed by: Electronically signed by Tamica Snider.  MICKY Louise CNP on 2/24/2023 at 10:12 AM

## 2023-02-25 ENCOUNTER — APPOINTMENT (OUTPATIENT)
Dept: GENERAL RADIOLOGY | Age: 73
DRG: 981 | End: 2023-02-25
Payer: MEDICARE

## 2023-02-25 LAB
ALBUMIN SERPL-MCNC: 2.7 GM/DL (ref 3.4–5)
ALP BLD-CCNC: 59 IU/L (ref 40–129)
ALT SERPL-CCNC: 6 U/L (ref 10–40)
ANION GAP SERPL CALCULATED.3IONS-SCNC: 11 MMOL/L (ref 4–16)
AST SERPL-CCNC: 10 IU/L (ref 15–37)
BILIRUB SERPL-MCNC: 0.5 MG/DL (ref 0–1)
BUN SERPL-MCNC: 66 MG/DL (ref 6–23)
CALCIUM SERPL-MCNC: 8.3 MG/DL (ref 8.3–10.6)
CHLORIDE BLD-SCNC: 100 MMOL/L (ref 99–110)
CO2: 31 MMOL/L (ref 21–32)
CREAT SERPL-MCNC: 3.8 MG/DL (ref 0.9–1.3)
CRP SERPL HS-MCNC: 29.4 MG/L
GFR SERPL CREATININE-BSD FRML MDRD: 16 ML/MIN/1.73M2
GLUCOSE BLD-MCNC: 81 MG/DL (ref 70–99)
GLUCOSE BLD-MCNC: 83 MG/DL (ref 70–99)
GLUCOSE BLD-MCNC: 92 MG/DL (ref 70–99)
GLUCOSE BLD-MCNC: 98 MG/DL (ref 70–99)
GLUCOSE SERPL-MCNC: 88 MG/DL (ref 70–99)
POTASSIUM SERPL-SCNC: 4.3 MMOL/L (ref 3.5–5.1)
PROCALCITONIN SERPL-MCNC: 0.24 NG/ML
SODIUM BLD-SCNC: 142 MMOL/L (ref 135–145)
TOTAL PROTEIN: 6.3 GM/DL (ref 6.4–8.2)

## 2023-02-25 PROCEDURE — 6360000002 HC RX W HCPCS: Performed by: INTERNAL MEDICINE

## 2023-02-25 PROCEDURE — 71045 X-RAY EXAM CHEST 1 VIEW: CPT

## 2023-02-25 PROCEDURE — 94640 AIRWAY INHALATION TREATMENT: CPT

## 2023-02-25 PROCEDURE — 51702 INSERT TEMP BLADDER CATH: CPT

## 2023-02-25 PROCEDURE — 86140 C-REACTIVE PROTEIN: CPT

## 2023-02-25 PROCEDURE — 92526 ORAL FUNCTION THERAPY: CPT

## 2023-02-25 PROCEDURE — 6360000002 HC RX W HCPCS: Performed by: NURSE PRACTITIONER

## 2023-02-25 PROCEDURE — 6370000000 HC RX 637 (ALT 250 FOR IP): Performed by: NURSE PRACTITIONER

## 2023-02-25 PROCEDURE — 80053 COMPREHEN METABOLIC PANEL: CPT

## 2023-02-25 PROCEDURE — 82962 GLUCOSE BLOOD TEST: CPT

## 2023-02-25 PROCEDURE — 6370000000 HC RX 637 (ALT 250 FOR IP): Performed by: STUDENT IN AN ORGANIZED HEALTH CARE EDUCATION/TRAINING PROGRAM

## 2023-02-25 PROCEDURE — 2060000000 HC ICU INTERMEDIATE R&B

## 2023-02-25 PROCEDURE — 94761 N-INVAS EAR/PLS OXIMETRY MLT: CPT

## 2023-02-25 PROCEDURE — 2700000000 HC OXYGEN THERAPY PER DAY

## 2023-02-25 PROCEDURE — 2580000003 HC RX 258: Performed by: NURSE PRACTITIONER

## 2023-02-25 PROCEDURE — 84145 PROCALCITONIN (PCT): CPT

## 2023-02-25 RX ORDER — IPRATROPIUM BROMIDE AND ALBUTEROL SULFATE 2.5; .5 MG/3ML; MG/3ML
1 SOLUTION RESPIRATORY (INHALATION) EVERY 4 HOURS PRN
Status: DISCONTINUED | OUTPATIENT
Start: 2023-02-25 | End: 2023-03-07 | Stop reason: HOSPADM

## 2023-02-25 RX ADMIN — FAMOTIDINE 20 MG: 20 TABLET ORAL at 09:39

## 2023-02-25 RX ADMIN — FUROSEMIDE 80 MG: 10 INJECTION, SOLUTION INTRAMUSCULAR; INTRAVENOUS at 09:39

## 2023-02-25 RX ADMIN — SODIUM CHLORIDE, PRESERVATIVE FREE 10 ML: 5 INJECTION INTRAVENOUS at 09:40

## 2023-02-25 RX ADMIN — COLLAGENASE SANTYL: 250 OINTMENT TOPICAL at 09:41

## 2023-02-25 RX ADMIN — LINEZOLID 600 MG: 600 TABLET ORAL at 09:39

## 2023-02-25 RX ADMIN — IPRATROPIUM BROMIDE AND ALBUTEROL SULFATE 1 AMPULE: 2.5; .5 SOLUTION RESPIRATORY (INHALATION) at 11:07

## 2023-02-25 RX ADMIN — SILVER SULFADIAZINE: 10 CREAM TOPICAL at 09:41

## 2023-02-25 RX ADMIN — FUROSEMIDE 80 MG: 10 INJECTION, SOLUTION INTRAMUSCULAR; INTRAVENOUS at 20:26

## 2023-02-25 RX ADMIN — HEPARIN SODIUM 5000 UNITS: 5000 INJECTION INTRAVENOUS; SUBCUTANEOUS at 14:18

## 2023-02-25 RX ADMIN — ASPIRIN 81 MG CHEWABLE TABLET 81 MG: 81 TABLET CHEWABLE at 09:39

## 2023-02-25 RX ADMIN — HEPARIN SODIUM 5000 UNITS: 5000 INJECTION INTRAVENOUS; SUBCUTANEOUS at 20:26

## 2023-02-25 RX ADMIN — METRONIDAZOLE 500 MG: 250 TABLET ORAL at 14:18

## 2023-02-25 RX ADMIN — SODIUM CHLORIDE, PRESERVATIVE FREE 10 ML: 5 INJECTION INTRAVENOUS at 20:27

## 2023-02-25 RX ADMIN — FUROSEMIDE 80 MG: 10 INJECTION, SOLUTION INTRAMUSCULAR; INTRAVENOUS at 14:18

## 2023-02-25 RX ADMIN — SODIUM HYPOCHLORITE: 1.25 SOLUTION TOPICAL at 09:42

## 2023-02-25 RX ADMIN — HEPARIN SODIUM 5000 UNITS: 5000 INJECTION INTRAVENOUS; SUBCUTANEOUS at 06:29

## 2023-02-25 RX ADMIN — METRONIDAZOLE 500 MG: 250 TABLET ORAL at 06:30

## 2023-02-25 ASSESSMENT — ENCOUNTER SYMPTOMS
WHEEZING: 0
BACK PAIN: 0
EYE ITCHING: 0
EYE PAIN: 0
CHEST TIGHTNESS: 0
SINUS PRESSURE: 0
DIARRHEA: 0
ABDOMINAL PAIN: 0
SORE THROAT: 0
SINUS PAIN: 0
CONSTIPATION: 0
NAUSEA: 0
SHORTNESS OF BREATH: 1
COUGH: 0
EYE REDNESS: 0
VOMITING: 0

## 2023-02-25 NOTE — PROGRESS NOTES
Nephrology Progress Note  2/25/2023 11:13 AM  Subjective: Interval History: Jose Mack is a 67 y.o. male with doing better more improved more awake        Data:   Scheduled Meds:   levoFLOXacin  750 mg Oral Every Other Day    metroNIDAZOLE  500 mg Oral 3 times per day    linezolid  600 mg Oral 2 times per day    silver sulfADIAZINE   Topical Daily    insulin glargine  5 Units SubCUTAneous Nightly    furosemide  80 mg IntraVENous TID    famotidine  20 mg Per NG tube Daily    heparin (porcine)  5,000 Units SubCUTAneous 3 times per day    sodium hypochlorite   Irrigation Daily    collagenase   Topical Daily    sodium chloride flush  5-40 mL IntraVENous 2 times per day    aspirin  81 mg Oral Daily    [Held by provider] clopidogrel  75 mg Oral Daily    ipratropium-albuterol  1 ampule Inhalation Q4H WA     Continuous Infusions:   dextrose      sodium chloride 10 mL/hr at 02/21/23 0644         CBC   Recent Labs     02/24/23  0615   WBC 5.4   HGB 7.9*   HCT 27.0*         BMP   Recent Labs     02/23/23  0515 02/24/23  0615 02/25/23  0630    140 142   K 4.3 4.3 4.3   CL 98* 102 100   CO2 30 29 31   PHOS  --  4.1  --    BUN 67* 64* 66*   CREATININE 3.3* 3.8* 3.8*     Hepatic:   Recent Labs     02/23/23  0515 02/24/23  0615 02/25/23  0630   AST 12* 11* 10*   ALT 7* 6* 6*   BILITOT 0.5 0.5 0.5   ALKPHOS 64 60 59     Troponin: No results for input(s): TROPONINI in the last 72 hours. BNP: No results for input(s): BNP in the last 72 hours. Lipids: No results for input(s): CHOL, HDL in the last 72 hours. Invalid input(s): LDLCALCU  ABGs:   Lab Results   Component Value Date/Time    PO2ART 89 02/11/2023 08:30 PM    UXD6JJQ 51.0 02/11/2023 08:30 PM     INR: No results for input(s): INR in the last 72 hours.   Renal Labs  Albumin:    Lab Results   Component Value Date/Time    LABALBU 2.7 02/25/2023 06:30 AM     Calcium:    Lab Results   Component Value Date/Time    CALCIUM 8.3 02/25/2023 06:30 AM Phosphorus:    Lab Results   Component Value Date/Time    PHOS 4.1 02/24/2023 06:15 AM     U/A:    Lab Results   Component Value Date/Time    NITRU NEGATIVE 02/23/2023 10:15 AM    COLORU YELLOW 02/23/2023 10:15 AM    PHUR 5.0 08/19/2016 09:38 AM    WBCUA 4 02/23/2023 10:15 AM    RBCUA 39 02/23/2023 10:15 AM    MUCUS RARE 02/23/2023 10:15 AM    TRICHOMONAS NONE SEEN 02/23/2023 10:15 AM    BACTERIA NEGATIVE 02/23/2023 10:15 AM    CLARITYU SLIGHTLY CLOUDY 02/23/2023 10:15 AM    SPECGRAV 1.020 02/23/2023 10:15 AM    UROBILINOGEN 0.2 02/23/2023 10:15 AM    BILIRUBINUR NEGATIVE 02/23/2023 10:15 AM    BLOODU LARGE NUMBER OR AMOUNT OBSERVED 02/23/2023 10:15 AM    KETUA TRACE 02/23/2023 10:15 AM     ABG:    Lab Results   Component Value Date/Time    PEX2BHY 51.0 02/11/2023 08:30 PM    PO2ART 89 02/11/2023 08:30 PM    BPU5UXS 19.0 02/11/2023 08:30 PM     HgBA1c:    Lab Results   Component Value Date/Time    LABA1C 7.1 10/12/2022 06:28 AM     Microalbumen/Creatinine ratio:  No components found for: RUCREAT  TSH:  No results found for: TSH  IRON:    Lab Results   Component Value Date/Time    IRON 38 10/23/2022 05:53 PM     Iron Saturation:  No components found for: PERCENTFE  TIBC:    Lab Results   Component Value Date/Time    TIBC 217 10/23/2022 05:53 PM     FERRITIN:    Lab Results   Component Value Date/Time    FERRITIN 66 10/23/2022 05:53 PM     RPR:  No results found for: RPR  TIGIST:  No results found for: ANATITER, TIGIST  24 Hour Urine for Creatinine Clearance:  No components found for: CREAT4, UHRS10, UTV10      Objective:   I/O: 02/24 0701 - 02/25 0700  In: -   Out: 8172 [Urine:2975]  I/O last 3 completed shifts:  In: -   Out: 4533 [Urine:3925]  No intake/output data recorded.   Vitals: BP (!) 148/59   Pulse 66   Temp 97.9 °F (36.6 °C) (Axillary)   Resp 15   Ht 6' (1.829 m)   Wt 264 lb 1.8 oz (119.8 kg)   SpO2 96%   BMI 35.82 kg/m²  {  General appearance: awake weak  HEENT: Head: Normal, normocephalic, atraumatic.   Neck: supple, symmetrical, trachea midline  Lungs: diminished breath sounds bilaterally  Heart: S1, S2 normal  Abdomen: abnormal findings:  soft nt  Extremities: edema trace  Neurologic: Mental status: alertness: alert        Assessment and Plan:      IMP:  #1 acute renal failure and CKD 4  #2 acute decompensated congestive heart failure and fluid overload  #3 hypertension  #4 generalized weakness   #5 Pseudomonas wound infection     Plan    #1 creatinine holding 3.8 not uremic off dialysis at this time good urine output  #2 cardiac status monitor with diuresis  #3 blood pressure holding for now  #4 work on need of possible rehab  #5 treated wound infection no fever we will monitor  Supportive care for now           Lucas Schulz MD, MD

## 2023-02-25 NOTE — RT PROTOCOL NOTE
RT Inhaler-Nebulizer Bronchodilator Protocol Note    There is a bronchodilator order in the chart from a provider indicating to follow the RT Bronchodilator Protocol and there is an Initiate RT Inhaler-Nebulizer Bronchodilator Protocol order as well (see protocol at bottom of note). CXR Findings:  XR CHEST PORTABLE    Result Date: 2/25/2023  No significant change from prior exam.  Persistent pulmonary edema. The findings from the last RT Protocol Assessment were as follows:   History Pulmonary Disease: None or smoker <15 pack years  Respiratory Pattern: Regular pattern and RR 12-20 bpm  Breath Sounds: Slightly diminished and/or crackles  Cough: Strong, spontaneous, non-productive  Indication for Bronchodilator Therapy: On home bronchodilators  Bronchodilator Assessment Score: 2    Aerosolized bronchodilator medication orders have been revised according to the RT Inhaler-Nebulizer Bronchodilator Protocol below. Respiratory Therapist to perform RT Therapy Protocol Assessment initially then follow the protocol. Repeat RT Therapy Protocol Assessment PRN for score 0-3 or on second treatment, BID, and PRN for scores above 3. No Indications - adjust the frequency to every 6 hours PRN wheezing or bronchospasm, if no treatments needed after 48 hours then discontinue using Per Protocol order mode. If indication present, adjust the RT bronchodilator orders based on the Bronchodilator Assessment Score as indicated below. Use Inhaler orders unless patient has one or more of the following: on home nebulizer, not able to hold breath for 10 seconds, is not alert and oriented, cannot activate and use MDI correctly, or respiratory rate 25 breaths per minute or more, then use the equivalent nebulizer order(s) with same Frequency and PRN reasons based on the score. If a patient is on this medication at home then do not decrease Frequency below that used at home.     0-3 - enter or revise RT bronchodilator order(s) to equivalent RT Bronchodilator order with Frequency of every 4 hours PRN for wheezing or increased work of breathing using Per Protocol order mode. 4-6 - enter or revise RT Bronchodilator order(s) to two equivalent RT bronchodilator orders with one order with BID Frequency and one order with Frequency of every 4 hours PRN wheezing or increased work of breathing using Per Protocol order mode. 7-10 - enter or revise RT Bronchodilator order(s) to two equivalent RT bronchodilator orders with one order with TID Frequency and one order with Frequency of every 4 hours PRN wheezing or increased work of breathing using Per Protocol order mode. 11-13 - enter or revise RT Bronchodilator order(s) to one equivalent RT bronchodilator order with QID Frequency and an Albuterol order with Frequency of every 4 hours PRN wheezing or increased work of breathing using Per Protocol order mode. Greater than 13 - enter or revise RT Bronchodilator order(s) to one equivalent RT bronchodilator order with every 4 hours Frequency and an Albuterol order with Frequency of every 2 hours PRN wheezing or increased work of breathing using Per Protocol order mode. RT to enter RT Home Evaluation for COPD & MDI Assessment order using Per Protocol order mode.     Electronically signed by Angel Mcfarlane RCP on 2/25/2023 at 3:02 PM

## 2023-02-25 NOTE — PROGRESS NOTES
V2.0  [facility] [place] Hospitalist Progress Note      Name:  Annette Retana /Age/Sex: 1950  (67 y.o. male)   MRN & CSN:  [id number] & [id number] Encounter Date/Time: 2023 6:51 PM EST    Location:  -A PCP: Daniel Andrew Day: 15    Assessment and Plan:   Annette Retana is a 67 y.o. male with pmh of  CAD, HFpEF, ICD, CKD, DM who presents with Acute respiratory failure with hypoxia and hypercapnia ([facility] [address]    Plan:  Acute hypoxic Hypercapneic respiratory failure with Pneumonia: was initially placed on BIPAP but later was intubated on MV was started on Empiric broad spectrum. CXR (): worsening R base opacity and small L pleural effusion. Now extubated (2023) and on 4L/min today. Wean O2 as able. On linezolid, levofloxacin and metronidazole. Cardiac arrest due to hypoxia: underwent CPR after pulse became thready after patient was reintubated when he was not being ventilated. Chest tube was also placed. Off dexmedetomidine   Acute renal failure with h/o CKD stage IV: was initially started on HD but could not tolerate. CRRT stopped. Follow up on nephro recs. Keep HD cath for now. On lasix 80mg IV TID per nephro. Unsure if he will need HD long term. Cr holding steady today at 3.8. Hyperkalemia-resolved: in the setting of above, improving  Elevated troponin: likely type 2 MI in the setting of respiratory as well as kidney failure, does have h/o CAD with multiple stents, Cardio on board. HFpEF:  EF 50-55% in . Repeat echo with simillar EF, hypokinetic RV with bowing of Interventricular septum towards LV. Chronic LE wounds: Wound care on board. Likely infected, Purulent drainage from RLE 1st metatarsal which is foul smelling. General surgery on consult, appreciate recs. Wound cultures sent - growing Pseudomonas aeruginosa, Citrobacter freundii MRSA, Enterococcus faecalis, Bacteroides thetaiotaomicron. On linezolid, levofloxacin, and metronidazole.   Infectious disease has been consulted and will defer antibiotic choices to them. Class II Obesity. BMI 35.82  Type II DM. On Lanus 5U HS. Diet ADULT ORAL NUTRITION SUPPLEMENT; Lunch, Dinner; Diabetic Oral Supplement  ADULT DIET; Dysphagia - Minced and Moist; Mildly Thick (Nectar)   DVT Prophylaxis [] Lovenox, [x]  Heparin, [] SCDs, [] Ambulation,  [] Eliquis, [] Xarelto  [] Coumadin   Code Status Full Code   Disposition From: Home  Expected Disposition: TBD  Estimated Date of Discharge: 2-4 days  Patient requires continued admission due to Respiratory and renal failure. Pending ID consult   Surrogate Decision Maker/ POA      Subjective:     Chief Complaint: Respiratory Distress     Patient's creatinine is holding steady today. Continues to have some shortness of breath and a repeat chest x-ray did not show any improvement of his pulmonary edema. Review of Systems:    Review of Systems   Constitutional:  Negative for appetite change, chills, fatigue, fever and unexpected weight change. HENT:  Negative for sinus pressure, sinus pain and sore throat. Eyes:  Negative for pain, redness and itching. Respiratory:  Positive for shortness of breath. Negative for cough, chest tightness and wheezing. Cardiovascular:  Negative for chest pain, palpitations and leg swelling. Gastrointestinal:  Negative for abdominal pain, constipation, diarrhea, nausea and vomiting. Endocrine: Negative for polydipsia, polyphagia and polyuria. Genitourinary:  Negative for decreased urine volume, difficulty urinating, dysuria, frequency, hematuria and urgency. Musculoskeletal:  Positive for myalgias. Negative for arthralgias and back pain. Skin:  Positive for wound. Negative for pallor and rash. Neurological:  Negative for dizziness, tremors, seizures, syncope, weakness, light-headedness, numbness and headaches. Psychiatric/Behavioral:  Negative for agitation and confusion. Objective:      Intake/Output Summary (Last 24 hours) at 2/25/2023 0936  Last data filed at 2/25/2023 0600  Gross per 24 hour   Intake --   Output 2975 ml   Net -2975 ml          Vitals:   Vitals:    02/25/23 0911   BP:    Pulse:    Resp:    Temp:    SpO2: 96%       Physical Exam:   Physical Exam  Vitals and nursing note reviewed. Constitutional:       General: He is not in acute distress. Appearance: He is obese. He is not ill-appearing, toxic-appearing or diaphoretic. Interventions: Nasal cannula in place. Comments: 4L/min   HENT:      Head: Normocephalic and atraumatic. Right Ear: External ear normal.      Left Ear: External ear normal.      Nose: Nose normal.      Mouth/Throat:      Mouth: Mucous membranes are moist.      Pharynx: Oropharynx is clear. Eyes:      General: No scleral icterus. Right eye: No discharge. Left eye: No discharge. Conjunctiva/sclera: Conjunctivae normal.      Pupils: Pupils are equal, round, and reactive to light. Cardiovascular:      Rate and Rhythm: Normal rate and regular rhythm. Pulses: Normal pulses. Heart sounds: Normal heart sounds. No murmur heard. No friction rub. No gallop. Pulmonary:      Effort: Pulmonary effort is normal. No respiratory distress. Breath sounds: Normal breath sounds. Decreased air movement and transmitted upper airway sounds (Improved) present. No stridor. No decreased breath sounds, wheezing, rhonchi or rales. Abdominal:      General: Bowel sounds are normal. There is no distension. Palpations: Abdomen is soft. There is no mass. Tenderness: There is no abdominal tenderness. There is no guarding or rebound. Hernia: No hernia is present. Musculoskeletal:      Right lower leg: No edema. Left lower leg: No edema. Comments: B/L LE bandaged   Skin:     Capillary Refill: Capillary refill takes less than 2 seconds. Neurological:      Mental Status: He is alert and oriented to person, place, and time.          Medications: Medications:    levoFLOXacin  750 mg Oral Every Other Day    metroNIDAZOLE  500 mg Oral 3 times per day    linezolid  600 mg Oral 2 times per day    silver sulfADIAZINE   Topical Daily    insulin glargine  5 Units SubCUTAneous Nightly    furosemide  80 mg IntraVENous TID    famotidine  20 mg Per NG tube Daily    heparin (porcine)  5,000 Units SubCUTAneous 3 times per day    sodium hypochlorite   Irrigation Daily    collagenase   Topical Daily    sodium chloride flush  5-40 mL IntraVENous 2 times per day    aspirin  81 mg Oral Daily    [Held by provider] clopidogrel  75 mg Oral Daily    ipratropium-albuterol  1 ampule Inhalation Q4H WA      Infusions:    dextrose      sodium chloride 10 mL/hr at 02/21/23 0644     PRN Meds: HYDROmorphone, 0.25 mg, Q4H PRN  albuterol, 2.5 mg, Q2H PRN  heparin flush, 240 Units, PRN  glucose, 4 tablet, PRN  dextrose bolus, 125 mL, PRN   Or  dextrose bolus, 250 mL, PRN  glucagon (rDNA), 1 mg, PRN  dextrose, , Continuous PRN  sodium chloride flush, 10 mL, PRN  sodium chloride, , PRN      Labs      Recent Results (from the past 24 hour(s))   POCT Glucose    Collection Time: 02/24/23 12:03 PM   Result Value Ref Range    POC Glucose 111 (H) 70 - 99 MG/DL   POCT Glucose    Collection Time: 02/24/23  8:26 PM   Result Value Ref Range    POC Glucose 91 70 - 99 MG/DL   POCT Glucose    Collection Time: 02/24/23 10:57 PM   Result Value Ref Range    POC Glucose 99 70 - 99 MG/DL   POCT Glucose    Collection Time: 02/25/23  6:22 AM   Result Value Ref Range    POC Glucose 81 70 - 99 MG/DL   Comprehensive Metabolic Panel    Collection Time: 02/25/23  6:30 AM   Result Value Ref Range    Sodium 142 135 - 145 MMOL/L    Potassium 4.3 3.5 - 5.1 MMOL/L    Chloride 100 99 - 110 mMol/L    CO2 31 21 - 32 MMOL/L    BUN 66 (H) 6 - 23 MG/DL    Creatinine 3.8 (H) 0.9 - 1.3 MG/DL    Est, Glom Filt Rate 16 (L) >60 mL/min/1.73m2    Glucose 88 70 - 99 MG/DL    Calcium 8.3 8.3 - 10.6 MG/DL    Albumin 2.7 (L) 3.4 - 5.0 GM/DL    Total Protein 6.3 (L) 6.4 - 8.2 GM/DL    Total Bilirubin 0.5 0.0 - 1.0 MG/DL    ALT 6 (L) 10 - 40 U/L    AST 10 (L) 15 - 37 IU/L    Alkaline Phosphatase 59 40 - 129 IU/L    Anion Gap 11 4 - 16   C-Reactive Protein    Collection Time: 02/25/23  6:30 AM   Result Value Ref Range    CRP High Sensitivity 29.4 (H) <5.0 mg/L   Procalcitonin    Collection Time: 02/25/23  6:30 AM   Result Value Ref Range    Procalcitonin 0.237         Imaging/Diagnostics Last 24 Hours   XR CHEST PORTABLE    Result Date: 2/11/2023  EXAMINATION: ONE XRAY VIEW OF THE CHEST 2/11/2023 7:29 pm COMPARISON: Chest radiograph 02/11/2023 HISTORY: ORDERING SYSTEM PROVIDED HISTORY: ETT placement TECHNOLOGIST PROVIDED HISTORY: Reason for exam:->ETT placement Reason for Exam: et and og tube placement confirmation Additional signs and symptoms: et and og tube placement confrimation FINDINGS: Lines and tubes: -ETT terminates at the superior margin of the clavicles. -enteric tube takes a the subdiaphragmatic course and terminates out of the field of view -right-sided Cordis catheter, which terminates within the mid/upper SVC Lungs: There is indistinctness of the pulmonary vasculature with patchy opacities within the right greater than left lungs. . Pleura: Small right-sided pleural effusion. Cardiomediastinal silhouette: Enlarged cardiac silhouette. Bones: No acute osseous findings. Soft tissues: Left-sided 2 lead cardiac device. Lines and tubes as above. The ETT terminates at the level of the superior margin of the clavicles. Grossly unchanged pulmonary exam.  Findings favor cardiogenic pulmonary edema. However a superimposed infectious process cannot be excluded.        Electronically signed by Jen Adams MD on 2/25/2023 at 9:36 AM

## 2023-02-25 NOTE — PROGRESS NOTES
[id number] [facility] SPEECH/LANGUAGE PATHOLOGY  DAILY PROGRESS NOTE  True Theodore  2/25/2023  [id number]  Hyperkalemia [E87.5]  Elevated troponin [R77.8]  Acidosis, metabolic, with respiratory acidosis [E87.4]  Acute respiratory failure with hypoxia and hypercapnia (HCC) [J96.01, J96.02]  Acute on chronic heart failure, unspecified heart failure type (HCC) [I50.9]  Acute kidney injury superimposed on CKD ([facility] [address] [N17.9, N18.9]  Acute kidney injury superimposed on chronic kidney disease ([facility] [address] [N17.9, N18.9]  Allergies   Allergen Reactions    Pcn [Penicillins] Hives    Fentanyl Itching         Pt was seen this date for dysphagia treatment. IMPRESSION AND RECOMMENDATIONS:   True Theodore was seen for swallow treatment seated upright in bed, pt was alert, cooperative, pleasant. Pt seen with PO trials of nectar via straw, puree, thins via straw. Oral phase was mildly impaired characterized by intact labial seal, oral holding of puree and liquids for about 2-3 seconds. Pharyngeal phase appears mildly impaired characterized by delayed swallow initiation. Pt with no s/s of aspiration noted across all trials. Pt with noted throat clear after thin liquid by straw trial.     Recommend continued moist and mince diet and continue nectar thick liquids. Recommend continue feed assist. Recommend pills crushed in puree as able. SLP will continue to follow for monitoring of diet tolerance and po trials of advanced diet. Results/recommendations d/w pt, and nurse. GOALS (current status in bold):  Short-term Goals  Timeframe for Short-term Goals: Length of stay  Goal 1: Pt will tolerate soft and bite-sized diet and nectar thick liquids with no s/s of aspiration moist and minced and tolerating NTL  Goal 2: Pt will tolerate PO trials of advanced diet with no s/s of aspiration progressing, cont. Goal 3: Pt/caregivers will demonstrate understanding of recommendations and POC progressing, cont. EDUCATION: results/recommendation and POC    PAIN RATING (0-10 Scale): n/a  Time in/Time out: SLP Individual Minutes  Minutes: 15    Visit number: [id number] Cornelio Hughes, MS CHAPPELL-SLP   2/25/2023  10:05 AM

## 2023-02-26 LAB
ANION GAP SERPL CALCULATED.3IONS-SCNC: 10 MMOL/L (ref 4–16)
BASOPHILS ABSOLUTE: 0.1 K/CU MM
BASOPHILS RELATIVE PERCENT: 0.8 % (ref 0–1)
BUN SERPL-MCNC: 67 MG/DL (ref 6–23)
CALCIUM SERPL-MCNC: 8.5 MG/DL (ref 8.3–10.6)
CHLORIDE BLD-SCNC: 100 MMOL/L (ref 99–110)
CO2: 32 MMOL/L (ref 21–32)
CREAT SERPL-MCNC: 3.9 MG/DL (ref 0.9–1.3)
CRP SERPL HS-MCNC: 28.5 MG/L
DIFFERENTIAL TYPE: ABNORMAL
EOSINOPHILS ABSOLUTE: 0.1 K/CU MM
EOSINOPHILS RELATIVE PERCENT: 1.9 % (ref 0–3)
GFR SERPL CREATININE-BSD FRML MDRD: 16 ML/MIN/1.73M2
GLUCOSE BLD-MCNC: 131 MG/DL (ref 70–99)
GLUCOSE BLD-MCNC: 132 MG/DL (ref 70–99)
GLUCOSE BLD-MCNC: 75 MG/DL (ref 70–99)
GLUCOSE BLD-MCNC: 94 MG/DL (ref 70–99)
GLUCOSE SERPL-MCNC: 87 MG/DL (ref 70–99)
HCT VFR BLD CALC: 28.3 % (ref 42–52)
HEMOGLOBIN: 8.2 GM/DL (ref 13.5–18)
IMMATURE NEUTROPHIL %: 0.3 % (ref 0–0.43)
LYMPHOCYTES ABSOLUTE: 0.9 K/CU MM
LYMPHOCYTES RELATIVE PERCENT: 12.7 % (ref 24–44)
MAGNESIUM: 1.9 MG/DL (ref 1.8–2.4)
MCH RBC QN AUTO: 25.9 PG (ref 27–31)
MCHC RBC AUTO-ENTMCNC: 29 % (ref 32–36)
MCV RBC AUTO: 89.3 FL (ref 78–100)
MONOCYTES ABSOLUTE: 0.5 K/CU MM
MONOCYTES RELATIVE PERCENT: 6.3 % (ref 0–4)
NUCLEATED RBC %: 0 %
PDW BLD-RTO: 19.2 % (ref 11.7–14.9)
PLATELET # BLD: 231 K/CU MM (ref 140–440)
PMV BLD AUTO: 9.3 FL (ref 7.5–11.1)
POTASSIUM SERPL-SCNC: 4.1 MMOL/L (ref 3.5–5.1)
RBC # BLD: 3.17 M/CU MM (ref 4.6–6.2)
SEGMENTED NEUTROPHILS ABSOLUTE COUNT: 5.6 K/CU MM
SEGMENTED NEUTROPHILS RELATIVE PERCENT: 78 % (ref 36–66)
SODIUM BLD-SCNC: 142 MMOL/L (ref 135–145)
TOTAL IMMATURE NEUTOROPHIL: 0.02 K/CU MM
TOTAL NUCLEATED RBC: 0 K/CU MM
TOTAL RETICULOCYTE COUNT: 0.05 K/CU MM
WBC # BLD: 7.2 K/CU MM (ref 4–10.5)

## 2023-02-26 PROCEDURE — 85025 COMPLETE CBC W/AUTO DIFF WBC: CPT

## 2023-02-26 PROCEDURE — 6360000002 HC RX W HCPCS: Performed by: NURSE PRACTITIONER

## 2023-02-26 PROCEDURE — 2060000000 HC ICU INTERMEDIATE R&B

## 2023-02-26 PROCEDURE — 82962 GLUCOSE BLOOD TEST: CPT

## 2023-02-26 PROCEDURE — 83735 ASSAY OF MAGNESIUM: CPT

## 2023-02-26 PROCEDURE — 6360000002 HC RX W HCPCS: Performed by: INTERNAL MEDICINE

## 2023-02-26 PROCEDURE — 6370000000 HC RX 637 (ALT 250 FOR IP): Performed by: NURSE PRACTITIONER

## 2023-02-26 PROCEDURE — 6370000000 HC RX 637 (ALT 250 FOR IP): Performed by: INTERNAL MEDICINE

## 2023-02-26 PROCEDURE — 36592 COLLECT BLOOD FROM PICC: CPT

## 2023-02-26 PROCEDURE — 2580000003 HC RX 258: Performed by: NURSE PRACTITIONER

## 2023-02-26 PROCEDURE — 6370000000 HC RX 637 (ALT 250 FOR IP): Performed by: STUDENT IN AN ORGANIZED HEALTH CARE EDUCATION/TRAINING PROGRAM

## 2023-02-26 PROCEDURE — 80048 BASIC METABOLIC PNL TOTAL CA: CPT

## 2023-02-26 PROCEDURE — 86140 C-REACTIVE PROTEIN: CPT

## 2023-02-26 RX ORDER — TORSEMIDE 20 MG/1
40 TABLET ORAL 2 TIMES DAILY
Status: DISCONTINUED | OUTPATIENT
Start: 2023-02-26 | End: 2023-02-27

## 2023-02-26 RX ADMIN — FAMOTIDINE 20 MG: 20 TABLET ORAL at 08:02

## 2023-02-26 RX ADMIN — METRONIDAZOLE 500 MG: 250 TABLET ORAL at 23:10

## 2023-02-26 RX ADMIN — SODIUM CHLORIDE, PRESERVATIVE FREE 10 ML: 5 INJECTION INTRAVENOUS at 08:02

## 2023-02-26 RX ADMIN — ASPIRIN 81 MG CHEWABLE TABLET 81 MG: 81 TABLET CHEWABLE at 08:02

## 2023-02-26 RX ADMIN — FUROSEMIDE 80 MG: 10 INJECTION, SOLUTION INTRAMUSCULAR; INTRAVENOUS at 08:01

## 2023-02-26 RX ADMIN — TORSEMIDE 40 MG: 20 TABLET ORAL at 11:47

## 2023-02-26 RX ADMIN — LINEZOLID 600 MG: 600 TABLET ORAL at 08:10

## 2023-02-26 RX ADMIN — HEPARIN SODIUM 5000 UNITS: 5000 INJECTION INTRAVENOUS; SUBCUTANEOUS at 23:09

## 2023-02-26 RX ADMIN — SODIUM HYPOCHLORITE: 1.25 SOLUTION TOPICAL at 10:54

## 2023-02-26 RX ADMIN — HEPARIN SODIUM 5000 UNITS: 5000 INJECTION INTRAVENOUS; SUBCUTANEOUS at 14:23

## 2023-02-26 RX ADMIN — SILVER SULFADIAZINE: 10 CREAM TOPICAL at 10:53

## 2023-02-26 RX ADMIN — METRONIDAZOLE 500 MG: 250 TABLET ORAL at 14:23

## 2023-02-26 RX ADMIN — SODIUM CHLORIDE, PRESERVATIVE FREE 10 ML: 5 INJECTION INTRAVENOUS at 23:11

## 2023-02-26 RX ADMIN — LINEZOLID 600 MG: 600 TABLET ORAL at 23:10

## 2023-02-26 RX ADMIN — TORSEMIDE 40 MG: 20 TABLET ORAL at 18:38

## 2023-02-26 RX ADMIN — LEVOFLOXACIN 750 MG: 500 TABLET, FILM COATED ORAL at 08:02

## 2023-02-26 RX ADMIN — COLLAGENASE SANTYL: 250 OINTMENT TOPICAL at 10:53

## 2023-02-26 RX ADMIN — HEPARIN SODIUM 5000 UNITS: 5000 INJECTION INTRAVENOUS; SUBCUTANEOUS at 05:22

## 2023-02-26 ASSESSMENT — ENCOUNTER SYMPTOMS
EYE PAIN: 0
SORE THROAT: 0
EYE ITCHING: 0
ABDOMINAL PAIN: 0
SINUS PAIN: 0
VOMITING: 0
EYE REDNESS: 0
CHEST TIGHTNESS: 0
SHORTNESS OF BREATH: 1
NAUSEA: 0
CONSTIPATION: 0
SINUS PRESSURE: 0
BACK PAIN: 0
COUGH: 0
WHEEZING: 0
DIARRHEA: 0

## 2023-02-26 NOTE — PLAN OF CARE
Problem: Discharge Planning  Goal: Discharge to home or other facility with appropriate resources  Outcome: Progressing  Flowsheets (Taken 2/26/2023 0802)  Discharge to home or other facility with appropriate resources:   Identify barriers to discharge with patient and caregiver   Arrange for needed discharge resources and transportation as appropriate   Identify discharge learning needs (meds, wound care, etc)   Refer to discharge planning if patient needs post-hospital services based on physician order or complex needs related to functional status, cognitive ability or social support system     Problem: Pain  Goal: Verbalizes/displays adequate comfort level or baseline comfort level  Outcome: Progressing  Flowsheets (Taken 2/26/2023 0745)  Verbalizes/displays adequate comfort level or baseline comfort level:   Encourage patient to monitor pain and request assistance   Assess pain using appropriate pain scale     Problem: Chronic Conditions and Co-morbidities  Goal: Patient's chronic conditions and co-morbidity symptoms are monitored and maintained or improved  Outcome: Progressing  Flowsheets (Taken 2/26/2023 0802)  Care Plan - Patient's Chronic Conditions and Co-Morbidity Symptoms are Monitored and Maintained or Improved:   Monitor and assess patient's chronic conditions and comorbid symptoms for stability, deterioration, or improvement   Collaborate with multidisciplinary team to address chronic and comorbid conditions and prevent exacerbation or deterioration     Problem: Nutrition Deficit:  Goal: Optimize nutritional status  Outcome: Progressing     Problem: Skin/Tissue Integrity  Goal: Absence of new skin breakdown  Description: 1. Monitor for areas of redness and/or skin breakdown  2. Assess vascular access sites hourly  3. Every 4-6 hours minimum:  Change oxygen saturation probe site  4.   Every 4-6 hours:  If on nasal continuous positive airway pressure, respiratory therapy assess nares and determine need for appliance change or resting period.   Outcome: Progressing     Problem: Safety - Adult  Goal: Free from fall injury  Outcome: Progressing  Flowsheets  Taken 2/26/2023 0803  Free From Fall Injury:   Instruct family/caregiver on patient safety   Based on caregiver fall risk screen, instruct family/caregiver to ask for assistance with transferring infant if caregiver noted to have fall risk factors  Taken 2/26/2023 0759  Free From Fall Injury:   Instruct family/caregiver on patient safety   Based on caregiver fall risk screen, instruct family/caregiver to ask for assistance with transferring infant if caregiver noted to have fall risk factors     Problem: ABCDS Injury Assessment  Goal: Absence of physical injury  Outcome: Progressing  Flowsheets  Taken 2/26/2023 0803  Absence of Physical Injury: Implement safety measures based on patient assessment  Taken 2/26/2023 0759  Absence of Physical Injury: Implement safety measures based on patient assessment

## 2023-02-26 NOTE — PROGRESS NOTES
V2.0  [facility] [place] Hospitalist Progress Note      Name:  Patti Martínez /Age/Sex: 1950  (67 y.o. male)   MRN & CSN:  [id number] & [id number] Encounter Date/Time: 2023 6:51 PM EST    Location:  [facility] PCP: Carter Phelps Day: 16    Assessment and Plan:   Patti Martínez is a 67 y.o. male with pmh of  CAD, HFpEF, ICD, CKD, DM who presents with Acute respiratory failure with hypoxia and hypercapnia ([facility] [address]    Plan:    Acute hypoxic Hypercapneic respiratory failure with Pneumonia: was initially placed on BIPAP but later was intubated on MV was started on Empiric broad spectrum. CXR (): worsening R base opacity and small L pleural effusion. Now extubated (2023) and on 4L/min today. Wean O2 as able. On linezolid, levofloxacin and metronidazole to complete a full course. Cardiac arrest due to hypoxia: underwent CPR after pulse became thready after patient was reintubated when he was not being ventilated. Chest tube was also placed. Off dexmedetomidine   Acute renal failure with h/o CKD stage IV: was initially started on HD but could not tolerate. CRRT stopped. Follow up on nephro recs. Keep HD cath for now. On lasix 80mg IV TID per nephro. Unsure if he will need HD long term. Cr holding steady today at 3.8. Hyperkalemia-resolved: in the setting of above, improving  Elevated troponin: likely type 2 MI in the setting of respiratory as well as kidney failure, does have h/o CAD with multiple stents, Cardio on board. HFpEF:  EF 50-55% in . Repeat echo with simillar EF, hypokinetic RV with bowing of Interventricular septum towards LV. Chronic LE wounds: Wound care on board. Likely infected, Purulent drainage from RLE 1st metatarsal which is foul smelling. General surgery on consult, appreciate recs. Wound cultures sent - growing Pseudomonas aeruginosa, Citrobacter freundii MRSA, Enterococcus faecalis, Bacteroides thetaiotaomicron. On linezolid, levofloxacin, and metronidazole. Infectious disease has been consulted and will defer antibiotic choices to them. Class II Obesity. BMI 35.82  Type II DM. On Lanus 5U HS. Diet ADULT ORAL NUTRITION SUPPLEMENT; Lunch, Dinner; Diabetic Oral Supplement  ADULT DIET; Dysphagia - Minced and Moist; Mildly Thick (Nectar)   DVT Prophylaxis [] Lovenox, [x]  Heparin, [] SCDs, [] Ambulation,  [] Eliquis, [] Xarelto  [] Coumadin   Code Status Full Code   Disposition From: Home  Expected Disposition: TBD  Estimated Date of Discharge: 2-4 days  Patient requires continued admission due to Respiratory and renal failure. Pending ID consult   Surrogate Decision Maker/ POA      Subjective:     Chief Complaint: Respiratory Distress     Patient's creatinine slightly higher today at 3.9. Patient still requiring supplemental oxygen. Review of Systems:    Review of Systems   Constitutional:  Negative for appetite change, chills, fatigue, fever and unexpected weight change. HENT:  Negative for sinus pressure, sinus pain and sore throat. Eyes:  Negative for pain, redness and itching. Respiratory:  Positive for shortness of breath. Negative for cough, chest tightness and wheezing. Cardiovascular:  Negative for chest pain, palpitations and leg swelling. Gastrointestinal:  Negative for abdominal pain, constipation, diarrhea, nausea and vomiting. Endocrine: Negative for polydipsia, polyphagia and polyuria. Genitourinary:  Negative for decreased urine volume, difficulty urinating, dysuria, frequency, hematuria and urgency. Musculoskeletal:  Positive for myalgias. Negative for arthralgias and back pain. Skin:  Positive for wound. Negative for pallor and rash. Neurological:  Negative for dizziness, tremors, seizures, syncope, weakness, light-headedness, numbness and headaches. Psychiatric/Behavioral:  Negative for agitation and confusion. Objective:      Intake/Output Summary (Last 24 hours) at 2/26/2023 8857  Last data filed at 2/26/2023 0801  Gross per 24 hour   Intake 20 ml   Output 3750 ml   Net -3730 ml          Vitals:   Vitals:    02/26/23 0745   BP: (!) 147/56   Pulse: 69   Resp: 15   Temp: 97.5 °F (36.4 °C)   SpO2: 92%       Physical Exam:   Physical Exam  Vitals and nursing note reviewed. Constitutional:       General: He is not in acute distress. Appearance: He is obese. He is not ill-appearing, toxic-appearing or diaphoretic. Interventions: Nasal cannula in place. Comments: 4L/min   HENT:      Head: Normocephalic and atraumatic. Right Ear: External ear normal.      Left Ear: External ear normal.      Nose: Nose normal.      Mouth/Throat:      Mouth: Mucous membranes are moist.      Pharynx: Oropharynx is clear. Eyes:      General: No scleral icterus. Right eye: No discharge. Left eye: No discharge. Conjunctiva/sclera: Conjunctivae normal.      Pupils: Pupils are equal, round, and reactive to light. Cardiovascular:      Rate and Rhythm: Normal rate and regular rhythm. Pulses: Normal pulses. Heart sounds: Normal heart sounds. No murmur heard. No friction rub. No gallop. Pulmonary:      Effort: Pulmonary effort is normal. No respiratory distress. Breath sounds: Normal breath sounds. Decreased air movement and transmitted upper airway sounds (Improved) present. No stridor. No decreased breath sounds, wheezing, rhonchi or rales. Abdominal:      General: Bowel sounds are normal. There is no distension. Palpations: Abdomen is soft. There is no mass. Tenderness: There is no abdominal tenderness. There is no guarding or rebound. Hernia: No hernia is present. Musculoskeletal:      Right lower leg: No edema. Left lower leg: No edema. Comments: B/L LE bandaged   Skin:     Capillary Refill: Capillary refill takes less than 2 seconds. Neurological:      Mental Status: He is alert and oriented to person, place, and time.          Medications:   Medications: levoFLOXacin  750 mg Oral Every Other Day    metroNIDAZOLE  500 mg Oral 3 times per day    linezolid  600 mg Oral 2 times per day    silver sulfADIAZINE   Topical Daily    insulin glargine  5 Units SubCUTAneous Nightly    furosemide  80 mg IntraVENous TID    famotidine  20 mg Per NG tube Daily    heparin (porcine)  5,000 Units SubCUTAneous 3 times per day    sodium hypochlorite   Irrigation Daily    collagenase   Topical Daily    sodium chloride flush  5-40 mL IntraVENous 2 times per day    aspirin  81 mg Oral Daily    [Held by provider] clopidogrel  75 mg Oral Daily      Infusions:    dextrose      sodium chloride 10 mL/hr at 02/21/23 0644     PRN Meds: ipratropium-albuterol, 1 ampule, Q4H PRN  HYDROmorphone, 0.25 mg, Q4H PRN  albuterol, 2.5 mg, Q2H PRN  heparin flush, 240 Units, PRN  glucose, 4 tablet, PRN  dextrose bolus, 125 mL, PRN   Or  dextrose bolus, 250 mL, PRN  glucagon (rDNA), 1 mg, PRN  dextrose, , Continuous PRN  sodium chloride flush, 10 mL, PRN  sodium chloride, , PRN      Labs      Recent Results (from the past 24 hour(s))   POCT Glucose    Collection Time: 02/25/23 11:49 AM   Result Value Ref Range    POC Glucose 83 70 - 99 MG/DL   POCT Glucose    Collection Time: 02/25/23  8:13 PM   Result Value Ref Range    POC Glucose 92 70 - 99 MG/DL   POCT Glucose    Collection Time: 02/25/23  8:22 PM   Result Value Ref Range    POC Glucose 98 70 - 99 MG/DL   C-Reactive Protein    Collection Time: 02/26/23  4:15 AM   Result Value Ref Range    CRP High Sensitivity 28.5 (H) <5.0 mg/L   POCT Glucose    Collection Time: 02/26/23  6:23 AM   Result Value Ref Range    POC Glucose 75 70 - 99 MG/DL   Basic Metabolic Panel    Collection Time: 02/26/23  8:00 AM   Result Value Ref Range    Sodium 142 135 - 145 MMOL/L    Potassium 4.1 3.5 - 5.1 MMOL/L    Chloride 100 99 - 110 mMol/L    CO2 32 21 - 32 MMOL/L    Anion Gap 10 4 - 16    BUN 67 (H) 6 - 23 MG/DL    Creatinine 3.9 (H) 0.9 - 1.3 MG/DL    Est, Glom Filt Rate 16 (L) >60 mL/min/1.73m2    Glucose 87 70 - 99 MG/DL    Calcium 8.5 8.3 - 10.6 MG/DL   Magnesium    Collection Time: 02/26/23  8:00 AM   Result Value Ref Range    Magnesium 1.9 1.8 - 2.4 mg/dl   CBC with Auto Differential    Collection Time: 02/26/23  8:00 AM   Result Value Ref Range    WBC 7.2 4.0 - 10.5 K/CU MM    RBC 3.17 (L) 4.6 - 6.2 M/CU MM    Hemoglobin 8.2 (L) 13.5 - 18.0 GM/DL    Hematocrit 28.3 (L) 42 - 52 %    MCV 89.3 78 - 100 FL    MCH 25.9 (L) 27 - 31 PG    MCHC 29.0 (L) 32.0 - 36.0 %    RDW 19.2 (H) 11.7 - 14.9 %    Platelets 530 298 - 127 K/CU MM    MPV 9.3 7.5 - 11.1 FL    Differential Type AUTOMATED DIFFERENTIAL     Segs Relative 78.0 (H) 36 - 66 %    Lymphocytes % 12.7 (L) 24 - 44 %    Monocytes % 6.3 (H) 0 - 4 %    Eosinophils % 1.9 0 - 3 %    Basophils % 0.8 0 - 1 %    Segs Absolute 5.6 K/CU MM    Lymphocytes Absolute 0.9 K/CU MM    Monocytes Absolute 0.5 K/CU MM    Eosinophils Absolute 0.1 K/CU MM    Basophils Absolute 0.1 K/CU MM    Nucleated RBC % 0.0 %    Total Nucleated RBC 0.0 K/CU MM    TRC 0.0523 K/CU MM    Total Immature Neutrophil 0.02 K/CU MM    Immature Neutrophil % 0.3 0 - 0.43 %        Imaging/Diagnostics Last 24 Hours   XR CHEST PORTABLE    Result Date: 2/11/2023  EXAMINATION: ONE XRAY VIEW OF THE CHEST 2/11/2023 7:29 pm COMPARISON: Chest radiograph 02/11/2023 HISTORY: ORDERING SYSTEM PROVIDED HISTORY: ETT placement TECHNOLOGIST PROVIDED HISTORY: Reason for exam:->ETT placement Reason for Exam: et and og tube placement confirmation Additional signs and symptoms: et and og tube placement confrimation FINDINGS: Lines and tubes: -ETT terminates at the superior margin of the clavicles. -enteric tube takes a the subdiaphragmatic course and terminates out of the field of view -right-sided Cordis catheter, which terminates within the mid/upper SVC Lungs: There is indistinctness of the pulmonary vasculature with patchy opacities within the right greater than left lungs. . Pleura: Small right-sided pleural effusion. Cardiomediastinal silhouette: Enlarged cardiac silhouette. Bones: No acute osseous findings. Soft tissues: Left-sided 2 lead cardiac device. Lines and tubes as above. The ETT terminates at the level of the superior margin of the clavicles. Grossly unchanged pulmonary exam.  Findings favor cardiogenic pulmonary edema. However a superimposed infectious process cannot be excluded.        Electronically signed by Pilar Christensen MD on 2/26/2023 at 9:22 AM

## 2023-02-26 NOTE — PLAN OF CARE
Problem: Discharge Planning  Goal: Discharge to home or other facility with appropriate resources  2/25/2023 2235 by Fannie Rg RN  Outcome: Progressing  2/25/2023 1547 by Melina Weller RN  Outcome: Progressing     Problem: Pain  Goal: Verbalizes/displays adequate comfort level or baseline comfort level  2/25/2023 2235 by Fannie Rg RN  Outcome: Progressing  2/25/2023 1547 by Melina Weller RN  Outcome: Progressing     Problem: Chronic Conditions and Co-morbidities  Goal: Patient's chronic conditions and co-morbidity symptoms are monitored and maintained or improved  2/25/2023 2235 by Fannie Rg RN  Outcome: Progressing  2/25/2023 1547 by Melina Weller RN  Outcome: Progressing     Problem: Nutrition Deficit:  Goal: Optimize nutritional status  2/25/2023 2235 by Fannie Rg RN  Outcome: Progressing  2/25/2023 1547 by Melina Weller RN  Outcome: Progressing     Problem: Skin/Tissue Integrity  Goal: Absence of new skin breakdown  Description: 1. Monitor for areas of redness and/or skin breakdown  2. Assess vascular access sites hourly  3. Every 4-6 hours minimum:  Change oxygen saturation probe site  4. Every 4-6 hours:  If on nasal continuous positive airway pressure, respiratory therapy assess nares and determine need for appliance change or resting period.   2/25/2023 2235 by Fannie Rg RN  Outcome: Progressing  2/25/2023 1547 by Melina Weller RN  Outcome: Progressing     Problem: Safety - Adult  Goal: Free from fall injury  2/25/2023 2235 by Fannie Rg RN  Outcome: Progressing  2/25/2023 1547 by Melina Weller RN  Outcome: Progressing     Problem: ABCDS Injury Assessment  Goal: Absence of physical injury  2/25/2023 2235 by Fannie Rg RN  Outcome: Progressing  2/25/2023 1547 by Melina Weller RN  Outcome: Progressing

## 2023-02-26 NOTE — PROGRESS NOTES
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 appears doing somewhat better today resting in the room with family in the room denies any fevers chills or chest pain      Data:   Scheduled Meds:   levoFLOXacin  750 mg Oral Every Other Day    metroNIDAZOLE  500 mg Oral 3 times per day    linezolid  600 mg Oral 2 times per day    silver sulfADIAZINE   Topical Daily    insulin glargine  5 Units SubCUTAneous Nightly    furosemide  80 mg IntraVENous TID    famotidine  20 mg Per NG tube Daily    heparin (porcine)  5,000 Units SubCUTAneous 3 times per day    sodium hypochlorite   Irrigation Daily    collagenase   Topical Daily    sodium chloride flush  5-40 mL IntraVENous 2 times per day    aspirin  81 mg Oral Daily    [Held by provider] clopidogrel  75 mg Oral Daily     Continuous Infusions:   dextrose      sodium chloride 10 mL/hr at 02/21/23 0644         CBC   Recent Labs     02/24/23  0615 02/26/23  0800   WBC 5.4 7.2   HGB 7.9* 8.2*   HCT 27.0* 28.3*    231      BMP   Recent Labs     02/24/23  0615 02/25/23  0630 02/26/23  0800    142 142   K 4.3 4.3 4.1    100 100   CO2 29 31 32   PHOS 4.1  --   --    BUN 64* 66* 67*   CREATININE 3.8* 3.8* 3.9*     Hepatic:   Recent Labs     02/24/23  0615 02/25/23  0630   AST 11* 10*   ALT 6* 6*   BILITOT 0.5 0.5   ALKPHOS 60 59     Troponin: No results for input(s): TROPONINI in the last 72 hours. BNP: No results for input(s): BNP in the last 72 hours. Lipids: No results for input(s): CHOL, HDL in the last 72 hours. Invalid input(s): LDLCALCU  ABGs:   Lab Results   Component Value Date/Time    PO2ART 89 02/11/2023 08:30 PM    XRL0YWO 51.0 02/11/2023 08:30 PM     INR: No results for input(s): INR in the last 72 hours.   Renal Labs  Albumin:    Lab Results   Component Value Date/Time    LABALBU 2.7 02/25/2023 06:30 AM     Calcium:    Lab Results   Component Value Date/Time    CALCIUM 8.5 02/26/2023 08:00 AM     Phosphorus:    Lab Results   Component Value Date/Time    PHOS 4.1 02/24/2023 06:15 AM     U/A:    Lab Results   Component Value Date/Time    NITRU NEGATIVE 02/23/2023 10:15 AM    COLORU YELLOW 02/23/2023 10:15 AM    PHUR 5.0 08/19/2016 09:38 AM    WBCUA 4 02/23/2023 10:15 AM    RBCUA 39 02/23/2023 10:15 AM    MUCUS RARE 02/23/2023 10:15 AM    TRICHOMONAS NONE SEEN 02/23/2023 10:15 AM    BACTERIA NEGATIVE 02/23/2023 10:15 AM    CLARITYU SLIGHTLY CLOUDY 02/23/2023 10:15 AM    SPECGRAV 1.020 02/23/2023 10:15 AM    UROBILINOGEN 0.2 02/23/2023 10:15 AM    BILIRUBINUR NEGATIVE 02/23/2023 10:15 AM    BLOODU LARGE NUMBER OR AMOUNT OBSERVED 02/23/2023 10:15 AM    KETUA TRACE 02/23/2023 10:15 AM     ABG:    Lab Results   Component Value Date/Time    GVZ6BYG 51.0 02/11/2023 08:30 PM    PO2ART 89 02/11/2023 08:30 PM    ROS4EHU 19.0 02/11/2023 08:30 PM     HgBA1c:    Lab Results   Component Value Date/Time    LABA1C 7.1 10/12/2022 06:28 AM     Microalbumen/Creatinine ratio:  No components found for: RUCREAT  TSH:  No results found for: TSH  IRON:    Lab Results   Component Value Date/Time    IRON 38 10/23/2022 05:53 PM     Iron Saturation:  No components found for: PERCENTFE  TIBC:    Lab Results   Component Value Date/Time    TIBC 217 10/23/2022 05:53 PM     FERRITIN:    Lab Results   Component Value Date/Time    FERRITIN 66 10/23/2022 05:53 PM     RPR:  No results found for: RPR  TIGIST:  No results found for: ANATITER, TIGIST  24 Hour Urine for Creatinine Clearance:  No components found for: CREAT4, UHRS10, UTV10      Objective:   I/O: 02/25 0701 - 02/26 0700  In: -   Out: [address] [Urine:3750]  I/O last 3 completed shifts:  In: -   Out: 2694 [Urine:5525]  I/O this shift:  In: 20 [I.V.:20]  Out: -   Vitals: BP (!) 147/56   Pulse 69   Temp 97.5 °F (36.4 °C) (Oral)   Resp 15   Ht 6' (1.829 m)   Wt 246 lb 4.1 oz (111.7 kg)   SpO2 92%   BMI 33.40 kg/m²  {  General appearance: awake weak  HEENT: Head: Normal, normocephalic, atraumatic. Neck: supple, symmetrical, trachea midline  Lungs: diminished breath sounds bilaterally  Heart: S1, S2 normal  Abdomen: abnormal findings:  soft nt  Extremities: edema trace  Neurologic: Mental status: alertness: alert        Assessment and Plan:      IMP:  #1 acute renal failure and CKD 4  #2 acute decompensated congestive heart failure and fluid overload  #3 hypertension  #4 generalized weakness   #5 Pseudomonas wound infection     Plan    #1 renal function slightly increased in the setting of diuresis volume appears overall stable with decreased diuretic dose monitor renal function.   If renal function stays about the same can likely remove HD catheter in the morning  #2 cardiac status monitor maintain net negative balance  #3 blood pressure overall generally stable  #4 work on rehab plans and therapy  #5 wound care  Overall slowly improving discussed with patient and family in the room dialysis may still be needed in the future but hopefully for now can avoid dialysis           Favian Ying MD, MD

## 2023-02-27 ENCOUNTER — APPOINTMENT (OUTPATIENT)
Dept: ULTRASOUND IMAGING | Age: 73
DRG: 981 | End: 2023-02-27
Payer: MEDICARE

## 2023-02-27 ENCOUNTER — APPOINTMENT (OUTPATIENT)
Dept: INTERVENTIONAL RADIOLOGY/VASCULAR | Age: 73
DRG: 981 | End: 2023-02-27
Payer: MEDICARE

## 2023-02-27 LAB
ANION GAP SERPL CALCULATED.3IONS-SCNC: 10 MMOL/L (ref 4–16)
APTT: 40.3 SECONDS (ref 25.1–37.1)
BASOPHILS ABSOLUTE: 0.1 K/CU MM
BASOPHILS RELATIVE PERCENT: 0.9 % (ref 0–1)
BUN SERPL-MCNC: 65 MG/DL (ref 6–23)
CALCIUM SERPL-MCNC: 8.3 MG/DL (ref 8.3–10.6)
CHLORIDE BLD-SCNC: 101 MMOL/L (ref 99–110)
CO2: 34 MMOL/L (ref 21–32)
CREAT SERPL-MCNC: 4.1 MG/DL (ref 0.9–1.3)
CRP SERPL HS-MCNC: 26.7 MG/L
CULTURE: NORMAL
DIFFERENTIAL TYPE: ABNORMAL
EOSINOPHILS ABSOLUTE: 0.2 K/CU MM
EOSINOPHILS RELATIVE PERCENT: 2.6 % (ref 0–3)
GFR SERPL CREATININE-BSD FRML MDRD: 15 ML/MIN/1.73M2
GLUCOSE BLD-MCNC: 101 MG/DL (ref 70–99)
GLUCOSE BLD-MCNC: 102 MG/DL (ref 70–99)
GLUCOSE BLD-MCNC: 111 MG/DL (ref 70–99)
GLUCOSE BLD-MCNC: 132 MG/DL (ref 70–99)
GLUCOSE SERPL-MCNC: 109 MG/DL (ref 70–99)
GRAM SMEAR: NORMAL
HCT VFR BLD CALC: 27.3 % (ref 42–52)
HEMOGLOBIN: 8 GM/DL (ref 13.5–18)
IMMATURE NEUTROPHIL %: 0.1 % (ref 0–0.43)
INR BLD: 1.34 INDEX
LYMPHOCYTES ABSOLUTE: 0.9 K/CU MM
LYMPHOCYTES RELATIVE PERCENT: 12.6 % (ref 24–44)
Lab: NORMAL
MCH RBC QN AUTO: 26.2 PG (ref 27–31)
MCHC RBC AUTO-ENTMCNC: 29.3 % (ref 32–36)
MCV RBC AUTO: 89.5 FL (ref 78–100)
MONOCYTES ABSOLUTE: 0.5 K/CU MM
MONOCYTES RELATIVE PERCENT: 6.4 % (ref 0–4)
NUCLEATED RBC %: 0 %
PDW BLD-RTO: 19.3 % (ref 11.7–14.9)
PLATELET # BLD: 211 K/CU MM (ref 140–440)
PMV BLD AUTO: 9.1 FL (ref 7.5–11.1)
POTASSIUM SERPL-SCNC: 4 MMOL/L (ref 3.5–5.1)
PROTHROMBIN TIME: 17.3 SECONDS (ref 11.7–14.5)
RBC # BLD: 3.05 M/CU MM (ref 4.6–6.2)
SEGMENTED NEUTROPHILS ABSOLUTE COUNT: 5.5 K/CU MM
SEGMENTED NEUTROPHILS RELATIVE PERCENT: 77.4 % (ref 36–66)
SODIUM BLD-SCNC: 145 MMOL/L (ref 135–145)
SPECIMEN: NORMAL
TOTAL IMMATURE NEUTOROPHIL: 0.01 K/CU MM
TOTAL NUCLEATED RBC: 0 K/CU MM
WBC # BLD: 7 K/CU MM (ref 4–10.5)

## 2023-02-27 PROCEDURE — 2700000000 HC OXYGEN THERAPY PER DAY

## 2023-02-27 PROCEDURE — 2580000003 HC RX 258: Performed by: NURSE PRACTITIONER

## 2023-02-27 PROCEDURE — 6360000002 HC RX W HCPCS: Performed by: STUDENT IN AN ORGANIZED HEALTH CARE EDUCATION/TRAINING PROGRAM

## 2023-02-27 PROCEDURE — 94761 N-INVAS EAR/PLS OXIMETRY MLT: CPT

## 2023-02-27 PROCEDURE — 6370000000 HC RX 637 (ALT 250 FOR IP): Performed by: NURSE PRACTITIONER

## 2023-02-27 PROCEDURE — 86140 C-REACTIVE PROTEIN: CPT

## 2023-02-27 PROCEDURE — 93985 DUP-SCAN HEMO COMPL BI STD: CPT

## 2023-02-27 PROCEDURE — 2060000000 HC ICU INTERMEDIATE R&B

## 2023-02-27 PROCEDURE — 80048 BASIC METABOLIC PNL TOTAL CA: CPT

## 2023-02-27 PROCEDURE — 85025 COMPLETE CBC W/AUTO DIFF WBC: CPT

## 2023-02-27 PROCEDURE — 85730 THROMBOPLASTIN TIME PARTIAL: CPT

## 2023-02-27 PROCEDURE — 6370000000 HC RX 637 (ALT 250 FOR IP): Performed by: STUDENT IN AN ORGANIZED HEALTH CARE EDUCATION/TRAINING PROGRAM

## 2023-02-27 PROCEDURE — 6360000002 HC RX W HCPCS: Performed by: NURSE PRACTITIONER

## 2023-02-27 PROCEDURE — 36592 COLLECT BLOOD FROM PICC: CPT

## 2023-02-27 PROCEDURE — 92526 ORAL FUNCTION THERAPY: CPT

## 2023-02-27 PROCEDURE — 85610 PROTHROMBIN TIME: CPT

## 2023-02-27 PROCEDURE — 6370000000 HC RX 637 (ALT 250 FOR IP): Performed by: INTERNAL MEDICINE

## 2023-02-27 RX ORDER — ONDANSETRON 2 MG/ML
4 INJECTION INTRAMUSCULAR; INTRAVENOUS EVERY 6 HOURS PRN
Status: DISCONTINUED | OUTPATIENT
Start: 2023-02-27 | End: 2023-03-07 | Stop reason: HOSPADM

## 2023-02-27 RX ORDER — TORSEMIDE 20 MG/1
40 TABLET ORAL DAILY
Status: DISCONTINUED | OUTPATIENT
Start: 2023-02-27 | End: 2023-03-07 | Stop reason: HOSPADM

## 2023-02-27 RX ADMIN — METRONIDAZOLE 500 MG: 250 TABLET ORAL at 22:25

## 2023-02-27 RX ADMIN — SODIUM HYPOCHLORITE: 1.25 SOLUTION TOPICAL at 09:15

## 2023-02-27 RX ADMIN — HEPARIN SODIUM 5000 UNITS: 5000 INJECTION INTRAVENOUS; SUBCUTANEOUS at 05:52

## 2023-02-27 RX ADMIN — HEPARIN SODIUM 5000 UNITS: 5000 INJECTION INTRAVENOUS; SUBCUTANEOUS at 22:25

## 2023-02-27 RX ADMIN — HEPARIN SODIUM 5000 UNITS: 5000 INJECTION INTRAVENOUS; SUBCUTANEOUS at 16:14

## 2023-02-27 RX ADMIN — FAMOTIDINE 20 MG: 20 TABLET ORAL at 09:15

## 2023-02-27 RX ADMIN — LINEZOLID 600 MG: 600 TABLET ORAL at 22:25

## 2023-02-27 RX ADMIN — ASPIRIN 81 MG CHEWABLE TABLET 81 MG: 81 TABLET CHEWABLE at 09:15

## 2023-02-27 RX ADMIN — TORSEMIDE 40 MG: 20 TABLET ORAL at 09:15

## 2023-02-27 RX ADMIN — LINEZOLID 600 MG: 600 TABLET ORAL at 09:15

## 2023-02-27 RX ADMIN — HYDROMORPHONE HYDROCHLORIDE 0.25 MG: 1 INJECTION, SOLUTION INTRAMUSCULAR; INTRAVENOUS; SUBCUTANEOUS at 09:31

## 2023-02-27 RX ADMIN — SILVER SULFADIAZINE: 10 CREAM TOPICAL at 09:15

## 2023-02-27 RX ADMIN — METRONIDAZOLE 500 MG: 250 TABLET ORAL at 16:14

## 2023-02-27 RX ADMIN — COLLAGENASE SANTYL: 250 OINTMENT TOPICAL at 09:17

## 2023-02-27 RX ADMIN — SODIUM CHLORIDE, PRESERVATIVE FREE 10 ML: 5 INJECTION INTRAVENOUS at 09:18

## 2023-02-27 ASSESSMENT — ENCOUNTER SYMPTOMS
WHEEZING: 0
VOMITING: 0
CONSTIPATION: 0
COUGH: 0
CHEST TIGHTNESS: 0
BACK PAIN: 0
SINUS PRESSURE: 0
EYE PAIN: 0
EYE REDNESS: 0
SORE THROAT: 0
DIARRHEA: 0
EYE ITCHING: 0
NAUSEA: 0
SINUS PAIN: 0
SHORTNESS OF BREATH: 1
ABDOMINAL PAIN: 0

## 2023-02-27 ASSESSMENT — PAIN DESCRIPTION - DESCRIPTORS: DESCRIPTORS: SORE;ACHING

## 2023-02-27 ASSESSMENT — PAIN SCALES - GENERAL
PAINLEVEL_OUTOF10: 10
PAINLEVEL_OUTOF10: 0

## 2023-02-27 ASSESSMENT — PAIN DESCRIPTION - LOCATION: LOCATION: SHOULDER

## 2023-02-27 ASSESSMENT — PAIN DESCRIPTION - ORIENTATION: ORIENTATION: RIGHT

## 2023-02-27 ASSESSMENT — PAIN - FUNCTIONAL ASSESSMENT: PAIN_FUNCTIONAL_ASSESSMENT: ACTIVITIES ARE NOT PREVENTED

## 2023-02-27 NOTE — PROGRESS NOTES
Arrived to room 2005 to remove right temporary dialysis catheter. Patient is in bed and will stay in bed for the procedure. Patient is pleasantly confused but procedure was explained. Dressing and sutures were removed. Site was scrubbed with chlorhexidine. Catheter was removed with tip intact. Pressure was held for approximately 5 minutes. 4x4 and tegaderm was applied. Patient tolerated procedure well without any complications.   Bedside report given to [facility] on [address].

## 2023-02-27 NOTE — PLAN OF CARE
Problem: Discharge Planning  Goal: Discharge to home or other facility with appropriate resources  Outcome: Progressing     Problem: Pain  Goal: Verbalizes/displays adequate comfort level or baseline comfort level  Outcome: Progressing  Flowsheets (Taken 2/26/2023 1803 by Annia Medellin RN)  Verbalizes/displays adequate comfort level or baseline comfort level:   Encourage patient to monitor pain and request assistance   Assess pain using appropriate pain scale     Problem: Chronic Conditions and Co-morbidities  Goal: Patient's chronic conditions and co-morbidity symptoms are monitored and maintained or improved  Outcome: Progressing     Problem: Nutrition Deficit:  Goal: Optimize nutritional status  Outcome: Progressing     Problem: Skin/Tissue Integrity  Goal: Absence of new skin breakdown  Description: 1. Monitor for areas of redness and/or skin breakdown  2. Assess vascular access sites hourly  3. Every 4-6 hours minimum:  Change oxygen saturation probe site  4. Every 4-6 hours:  If on nasal continuous positive airway pressure, respiratory therapy assess nares and determine need for appliance change or resting period.   Outcome: Progressing     Problem: Safety - Adult  Goal: Free from fall injury  Outcome: Progressing     Problem: ABCDS Injury Assessment  Goal: Absence of physical injury  Outcome: Progressing

## 2023-02-27 NOTE — PROGRESS NOTES
Nephrology Progress Note  2/27/2023 7:00 AM        Subjective:   Admit Date: 2/11/2023  PCP: Livan Workman    Interval History: Weekend events briefly reviewed    Diet: Better    ROS: Complain of nausea, urine output still 3.65 L for the last 24 hours   no fever and acceptable blood pressure although need manual confirmation    Data:     Current meds:    torsemide  40 mg Oral BID    levoFLOXacin  750 mg Oral Every Other Day    metroNIDAZOLE  500 mg Oral 3 times per day    linezolid  600 mg Oral 2 times per day    silver sulfADIAZINE   Topical Daily    insulin glargine  5 Units SubCUTAneous Nightly    famotidine  20 mg Per NG tube Daily    heparin (porcine)  5,000 Units SubCUTAneous 3 times per day    sodium hypochlorite   Irrigation Daily    collagenase   Topical Daily    sodium chloride flush  5-40 mL IntraVENous 2 times per day    aspirin  81 mg Oral Daily    [Held by provider] clopidogrel  75 mg Oral Daily      dextrose      sodium chloride 10 mL/hr at 02/21/23 0644         I/O last 3 completed shifts:   In: 200 [P.O.:180; I.V.:20]  Out: 4800 [Urine:4800]    CBC:   Recent Labs     02/26/23  0800 02/27/23  0430   WBC 7.2 7.0   HGB 8.2* 8.0*    211          Recent Labs     02/25/23  0630 02/26/23  0800 02/27/23  0430    142 145   K 4.3 4.1 4.0    100 101   CO2 31 32 34*   BUN 66* 67* 65*   CREATININE 3.8* 3.9* 4.1*   GLUCOSE 88 87 109*       Lab Results   Component Value Date    CALCIUM 8.3 02/27/2023    PHOS 4.1 02/24/2023       Objective:     Vitals: BP (!) 154/106   Pulse 67   Temp 98.4 °F (36.9 °C) (Oral)   Resp 16   Ht 6' (1.829 m)   Wt 235 lb 10.8 oz (106.9 kg)   SpO2 92%   BMI 31.96 kg/m² ,    General appearance: Alert, active oriented  HEENT: Mild conjunctival pallor no scleral icterus  Neck: Seems supple right IJ temporary dialysis catheter  Lungs: Adventitious breath sound but no gross crackles  Heart: Regular rate and rhythm, left chest tube implanted cardiac device  Abdomen: Soft, nontender  Extremities: Significant improvement of leg edema, wrinkling of skin suggestive of recent diuresis  Still has Fuller catheter      Problem List :         Impression :     Stage III acute kidney disease on top of CKD stage IV A3-  Urine output-BUN/creatinine has not plateaued yet but expected to do soon-mainly from cardiorenal syndrome and recent sepsis  Acute decompensated heart failure with fluid overload-reported by septic shock recovering  Underlying atherosclerotic cardiovascular disease and multiple chronic condition  Underlying diabetes mellitus  Urethral stricture with bladder obstruction    Recommendation/Plan  :     Katie Ast intensify oral diuretics-remove temporary dialysis catheter-bilateral upper extremity vessel mapping-for fistula creation soon-antiemetic, good glycemic control, I will discuss with Dr. Antunez/urology service-about distal ureteral stricture-in the fate of  Fuller catheter-we should start thinking about rehabilitation for him-after discussion with Mr. Halley Adhikari clinically and biochemically    Jarrod Cardenas MD MD

## 2023-02-27 NOTE — PROGRESS NOTES
[id number] [facility] SPEECH/LANGUAGE PATHOLOGY  DAILY PROGRESS NOTE  Lewis Morillo  2/27/2023  [id number]  Hyperkalemia [E87.5]  Elevated troponin [R77.8]  Acidosis, metabolic, with respiratory acidosis [E87.4]  Acute respiratory failure with hypoxia and hypercapnia (HCC) [J96.01, J96.02]  Acute on chronic heart failure, unspecified heart failure type (HCC) [I50.9]  Acute kidney injury superimposed on CKD ([facility] [address] [N17.9, N18.9]  Acute kidney injury superimposed on chronic kidney disease ([facility] [address] [N17.9, N18.9]  Allergies   Allergen Reactions    Pcn [Penicillins] Hives    Fentanyl Itching         Pt was seen this date for dysphagia treatment. IMPRESSION AND RECOMMENDATIONS:   Lewis Morillo was seen for swallow treatment seated upright in bed, pt was alert, cooperative, pleasant. Pt seen with PO trials of ice chips and thin liquids via cup. Oral phase was mildly impaired characterized by intact labial seal, oral holding of puree and liquids for about 1-2 seconds. Pharyngeal phase appears mildly impaired characterized by delayed swallow initiation. Pt with no s/s of aspiration noted across all trials. Pt with noted audible/uncoordinated swallow with thin liquids. Recommend initiate ice chips. Recommend MBS. Continue moist and mince diet and continue nectar thick liquids. Recommend continue feed assist. Recommend pills crushed in puree as able. SLP will continue to follow for monitoring of diet tolerance and po trials of advanced diet. Results/recommendations d/w pt, and nurse. GOALS (current status in bold):  Short-term Goals  Timeframe for Short-term Goals: Length of stay  Goal 1: Pt will tolerate soft and bite-sized diet and nectar thick liquids with no s/s of aspiration moist and minced and tolerating NTL  Goal 2: Pt will tolerate PO trials of advanced diet with no s/s of aspiration progressing, cont.   Goal 3: Pt/caregivers will demonstrate understanding of recommendations and POC progressing, cont.                             EDUCATION: results/recommendation and POC    PAIN RATING (0-10 Scale): n/a  Time in/Time out: SLP Individual Minutes  Minutes: 20    Visit number: 49 Kelly Mercado MS CCC-SLP   2/27/2023  12:36 PM

## 2023-02-27 NOTE — CARE COORDINATION
CM reviewed chart. Pt needs an order for HHC. CM sent PS tp Dr Julio Krishna to ask for home care order. Given. CM discussed in IDR. Pt not medically ready at this time. Plan home with Jewels Nunez

## 2023-02-27 NOTE — PROGRESS NOTES
V2.0  [facility] Hospitalist Progress Note      Name:  Eyal Villanueva /Age/Sex: 1950  (67 y.o. male)   MRN & CSN:  [id number] & [id number] Encounter Date/Time: 2023 6:51 PM EST    Location:  [facility] PCP: Mike Mederos Day: 17    Assessment and Plan:   Eyal Villanueva is a 67 y.o. male with pmh of  CAD, HFpEF, ICD, CKD, DM who presents with Acute respiratory failure with hypoxia and hypercapnia ([facility] [address]    Plan:    Acute hypoxic Hypercapneic respiratory failure with Pneumonia: was initially placed on BIPAP but later was intubated on MV was started on Empiric broad spectrum. CXR (): worsening R base opacity and small L pleural effusion. Now extubated (2023) and on 4L/min today. Wean O2 as able. On linezolid, levofloxacin and metronidazole to complete a full course. Cardiac arrest due to hypoxia: underwent CPR after pulse became thready after patient was reintubated when he was not being ventilated. Chest tube was also placed. Off dexmedetomidine   Acute renal failure with h/o CKD stage IV: was initially started on HD but could not tolerate. CRRT stopped. Follow up on nephro recs. Keep HD cath for now. On 40 mg torsemide p.o. per nephro. Unsure if he will need HD long term. Cr now 4.1. Temporary dialysis catheter has been removed and he will have vein mapping done for fistula creation. Hyperkalemia-resolved: in the setting of above, improving  Elevated troponin: likely type 2 MI in the setting of respiratory as well as kidney failure, does have h/o CAD with multiple stents, Cardio on board. HFpEF:  EF 50-55% in . Repeat echo with simillar EF, hypokinetic RV with bowing of Interventricular septum towards LV. Chronic LE wounds: Wound care on board. Likely infected, Purulent drainage from RLE 1st metatarsal which is foul smelling. General surgery on consult, appreciate recs.  Wound cultures sent - growing Pseudomonas aeruginosa, Citrobacter freundii MRSA, Enterococcus faecalis, Bacteroides thetaiotaomicron. On linezolid, levofloxacin, and metronidazole. Infectious disease has been consulted and will defer antibiotic choices to them. Class II Obesity. BMI 35.82  Type II DM. Monitor off of insulin. Could benefit from orals at [place], but be wary of anything causes hypoglycemia. Diet ADULT ORAL NUTRITION SUPPLEMENT; Lunch, Dinner; Diabetic Oral Supplement  ADULT DIET; Dysphagia - Minced and Moist; Mildly Thick (Nectar)   DVT Prophylaxis [] Lovenox, [x]  Heparin, [] SCDs, [] Ambulation,  [] Eliquis, [] Xarelto  [] Coumadin   Code Status Full Code   Disposition From: Home  Expected Disposition: TBD  Estimated Date of Discharge: 2-4 days  Patient requires continued admission due to Respiratory and renal failure   Surrogate Decision Maker/ POA      Subjective:     Chief Complaint: Respiratory Distress     Patient's creatinine continues to trend upwards about 4.1. Review of Systems:    Review of Systems   Constitutional:  Negative for appetite change, chills, fatigue, fever and unexpected weight change. HENT:  Negative for sinus pressure, sinus pain and sore throat. Eyes:  Negative for pain, redness and itching. Respiratory:  Positive for shortness of breath. Negative for cough, chest tightness and wheezing. Cardiovascular:  Negative for chest pain, palpitations and leg swelling. Gastrointestinal:  Negative for abdominal pain, constipation, diarrhea, nausea and vomiting. Endocrine: Negative for polydipsia, polyphagia and polyuria. Genitourinary:  Negative for decreased urine volume, difficulty urinating, dysuria, frequency, hematuria and urgency. Musculoskeletal:  Positive for myalgias. Negative for arthralgias and back pain. Skin:  Positive for wound. Negative for pallor and rash. Neurological:  Negative for dizziness, tremors, seizures, syncope, weakness, light-headedness, numbness and headaches.    Psychiatric/Behavioral:  Negative for agitation and confusion. Objective: Intake/Output Summary (Last 24 hours) at 2/27/2023 0948  Last data filed at 2/27/2023 0753  Gross per 24 hour   Intake 140 ml   Output 3650 ml   Net -3510 ml          Vitals:   Vitals:    02/27/23 0605   BP: (!) 154/106   Pulse: 67   Resp: 16   Temp:    SpO2: 92%       Physical Exam:   Physical Exam  Vitals and nursing note reviewed. Constitutional:       General: He is not in acute distress. Appearance: He is obese. He is not ill-appearing, toxic-appearing or diaphoretic. Interventions: Nasal cannula in place. Comments: 4L/min   HENT:      Head: Normocephalic and atraumatic. Right Ear: External ear normal.      Left Ear: External ear normal.      Nose: Nose normal.      Mouth/Throat:      Mouth: Mucous membranes are moist.      Pharynx: Oropharynx is clear. Eyes:      General: No scleral icterus. Right eye: No discharge. Left eye: No discharge. Conjunctiva/sclera: Conjunctivae normal.      Pupils: Pupils are equal, round, and reactive to light. Cardiovascular:      Rate and Rhythm: Normal rate and regular rhythm. Pulses: Normal pulses. Heart sounds: Normal heart sounds. No murmur heard. No friction rub. No gallop. Pulmonary:      Effort: Pulmonary effort is normal. No respiratory distress. Breath sounds: Normal breath sounds. Decreased air movement and transmitted upper airway sounds (Improved) present. No stridor. No decreased breath sounds, wheezing, rhonchi or rales. Abdominal:      General: Bowel sounds are normal. There is no distension. Palpations: Abdomen is soft. There is no mass. Tenderness: There is no abdominal tenderness. There is no guarding or rebound. Hernia: No hernia is present. Musculoskeletal:      Right lower leg: No edema. Left lower leg: No edema. Comments: B/L LE bandaged   Skin:     Capillary Refill: Capillary refill takes less than 2 seconds.    Neurological: Mental Status: He is alert and oriented to person, place, and time.          Medications:   Medications:    torsemide  40 mg Oral Daily    levoFLOXacin  750 mg Oral Every Other Day    metroNIDAZOLE  500 mg Oral 3 times per day    linezolid  600 mg Oral 2 times per day    silver sulfADIAZINE   Topical Daily    insulin glargine  5 Units SubCUTAneous Nightly    famotidine  20 mg Per NG tube Daily    heparin (porcine)  5,000 Units SubCUTAneous 3 times per day    sodium hypochlorite   Irrigation Daily    collagenase   Topical Daily    sodium chloride flush  5-40 mL IntraVENous 2 times per day    aspirin  81 mg Oral Daily    [Held by provider] clopidogrel  75 mg Oral Daily      Infusions:    dextrose      sodium chloride 10 mL/hr at 02/21/23 0644     PRN Meds: ondansetron, 4 mg, Q6H PRN  ipratropium-albuterol, 1 ampule, Q4H PRN  HYDROmorphone, 0.25 mg, Q4H PRN  albuterol, 2.5 mg, Q2H PRN  heparin flush, 240 Units, PRN  glucose, 4 tablet, PRN  dextrose bolus, 125 mL, PRN   Or  dextrose bolus, 250 mL, PRN  glucagon (rDNA), 1 mg, PRN  dextrose, , Continuous PRN  sodium chloride flush, 10 mL, PRN  sodium chloride, , PRN      Labs      Recent Results (from the past 24 hour(s))   POCT Glucose    Collection Time: 02/26/23 11:13 AM   Result Value Ref Range    POC Glucose 94 70 - 99 MG/DL   POCT Glucose    Collection Time: 02/26/23  4:07 PM   Result Value Ref Range    POC Glucose 132 (H) 70 - 99 MG/DL   POCT Glucose    Collection Time: 02/26/23  7:58 PM   Result Value Ref Range    POC Glucose 131 (H) 70 - 99 MG/DL   C-Reactive Protein    Collection Time: 02/27/23  4:30 AM   Result Value Ref Range    CRP High Sensitivity 26.7 (H) <5.0 mg/L   Basic Metabolic Panel w/ Reflex to MG    Collection Time: 02/27/23  4:30 AM   Result Value Ref Range    Sodium 145 135 - 145 MMOL/L    Potassium 4.0 3.5 - 5.1 MMOL/L    Chloride 101 99 - 110 mMol/L    CO2 34 (H) 21 - 32 MMOL/L    Anion Gap 10 4 - 16    BUN 65 (H) 6 - 23 MG/DL    Creatinine 4.1 (H) 0.9 - 1.3 MG/DL    Est, Glom Filt Rate 15 (L) >60 mL/min/1.73m2    Glucose 109 (H) 70 - 99 MG/DL    Calcium 8.3 8.3 - 10.6 MG/DL   CBC with Auto Differential    Collection Time: 02/27/23  4:30 AM   Result Value Ref Range    WBC 7.0 4.0 - 10.5 K/CU MM    RBC 3.05 (L) 4.6 - 6.2 M/CU MM    Hemoglobin 8.0 (L) 13.5 - 18.0 GM/DL    Hematocrit 27.3 (L) 42 - 52 %    MCV 89.5 78 - 100 FL    MCH 26.2 (L) 27 - 31 PG    MCHC 29.3 (L) 32.0 - 36.0 %    RDW 19.3 (H) 11.7 - 14.9 %    Platelets 950 724 - 942 K/CU MM    MPV 9.1 7.5 - 11.1 FL    Differential Type AUTOMATED DIFFERENTIAL     Segs Relative 77.4 (H) 36 - 66 %    Lymphocytes % 12.6 (L) 24 - 44 %    Monocytes % 6.4 (H) 0 - 4 %    Eosinophils % 2.6 0 - 3 %    Basophils % 0.9 0 - 1 %    Segs Absolute 5.5 K/CU MM    Lymphocytes Absolute 0.9 K/CU MM    Monocytes Absolute 0.5 K/CU MM    Eosinophils Absolute 0.2 K/CU MM    Basophils Absolute 0.1 K/CU MM    Nucleated RBC % 0.0 %    Total Nucleated RBC 0.0 K/CU MM    Total Immature Neutrophil 0.01 K/CU MM    Immature Neutrophil % 0.1 0 - 0.43 %   POCT Glucose    Collection Time: 02/27/23  6:59 AM   Result Value Ref Range    POC Glucose 101 (H) 70 - 99 MG/DL   Protime/INR & PTT    Collection Time: 02/27/23  8:00 AM   Result Value Ref Range    Protime 17.3 (H) 11.7 - 14.5 SECONDS    INR 1.34 INDEX    aPTT 40.3 (H) 25.1 - 37.1 SECONDS        Imaging/Diagnostics Last 24 Hours   XR CHEST PORTABLE    Result Date: 2/11/2023  EXAMINATION: ONE XRAY VIEW OF THE CHEST 2/11/2023 7:29 pm COMPARISON: Chest radiograph 02/11/2023 HISTORY: ORDERING SYSTEM PROVIDED HISTORY: ETT placement TECHNOLOGIST PROVIDED HISTORY: Reason for exam:->ETT placement Reason for Exam: et and og tube placement confirmation Additional signs and symptoms: et and og tube placement confrimation FINDINGS: Lines and tubes: -ETT terminates at the superior margin of the clavicles.  -enteric tube takes a the subdiaphragmatic course and terminates out of the field of view -right-sided Cordis catheter, which terminates within the mid/upper SVC Lungs: There is indistinctness of the pulmonary vasculature with patchy opacities within the right greater than left lungs. . Pleura: Small right-sided pleural effusion. Cardiomediastinal silhouette: Enlarged cardiac silhouette. Bones: No acute osseous findings. Soft tissues: Left-sided 2 lead cardiac device. Lines and tubes as above. The ETT terminates at the level of the superior margin of the clavicles. Grossly unchanged pulmonary exam.  Findings favor cardiogenic pulmonary edema. However a superimposed infectious process cannot be excluded.        Electronically signed by Carol Mackey MD on 2/27/2023 at 9:48 AM

## 2023-02-28 LAB
ANION GAP SERPL CALCULATED.3IONS-SCNC: 8 MMOL/L (ref 4–16)
BASOPHILS ABSOLUTE: 0.1 K/CU MM
BASOPHILS RELATIVE PERCENT: 0.7 % (ref 0–1)
BUN SERPL-MCNC: 61 MG/DL (ref 6–23)
CALCIUM SERPL-MCNC: 8.2 MG/DL (ref 8.3–10.6)
CHLORIDE BLD-SCNC: 101 MMOL/L (ref 99–110)
CO2: 35 MMOL/L (ref 21–32)
CREAT SERPL-MCNC: 3.9 MG/DL (ref 0.9–1.3)
DIFFERENTIAL TYPE: ABNORMAL
EOSINOPHILS ABSOLUTE: 0.2 K/CU MM
EOSINOPHILS RELATIVE PERCENT: 2.8 % (ref 0–3)
GFR SERPL CREATININE-BSD FRML MDRD: 16 ML/MIN/1.73M2
GLUCOSE SERPL-MCNC: 100 MG/DL (ref 70–99)
HCT VFR BLD CALC: 28.1 % (ref 42–52)
HEMOGLOBIN: 8 GM/DL (ref 13.5–18)
IMMATURE NEUTROPHIL %: 0.3 % (ref 0–0.43)
LYMPHOCYTES ABSOLUTE: 0.9 K/CU MM
LYMPHOCYTES RELATIVE PERCENT: 13.4 % (ref 24–44)
MCH RBC QN AUTO: 26.1 PG (ref 27–31)
MCHC RBC AUTO-ENTMCNC: 28.5 % (ref 32–36)
MCV RBC AUTO: 91.5 FL (ref 78–100)
MONOCYTES ABSOLUTE: 0.4 K/CU MM
MONOCYTES RELATIVE PERCENT: 6.3 % (ref 0–4)
NUCLEATED RBC %: 0 %
PDW BLD-RTO: 19.2 % (ref 11.7–14.9)
PLATELET # BLD: 200 K/CU MM (ref 140–440)
PMV BLD AUTO: 9.2 FL (ref 7.5–11.1)
POTASSIUM SERPL-SCNC: 3.9 MMOL/L (ref 3.5–5.1)
RBC # BLD: 3.07 M/CU MM (ref 4.6–6.2)
SEGMENTED NEUTROPHILS ABSOLUTE COUNT: 5.2 K/CU MM
SEGMENTED NEUTROPHILS RELATIVE PERCENT: 76.5 % (ref 36–66)
SODIUM BLD-SCNC: 144 MMOL/L (ref 135–145)
TOTAL IMMATURE NEUTOROPHIL: 0.02 K/CU MM
TOTAL NUCLEATED RBC: 0 K/CU MM
WBC # BLD: 6.9 K/CU MM (ref 4–10.5)

## 2023-02-28 PROCEDURE — 92526 ORAL FUNCTION THERAPY: CPT

## 2023-02-28 PROCEDURE — 97535 SELF CARE MNGMENT TRAINING: CPT

## 2023-02-28 PROCEDURE — 6370000000 HC RX 637 (ALT 250 FOR IP): Performed by: INTERNAL MEDICINE

## 2023-02-28 PROCEDURE — 97530 THERAPEUTIC ACTIVITIES: CPT

## 2023-02-28 PROCEDURE — 94761 N-INVAS EAR/PLS OXIMETRY MLT: CPT

## 2023-02-28 PROCEDURE — 2700000000 HC OXYGEN THERAPY PER DAY

## 2023-02-28 PROCEDURE — 6370000000 HC RX 637 (ALT 250 FOR IP): Performed by: STUDENT IN AN ORGANIZED HEALTH CARE EDUCATION/TRAINING PROGRAM

## 2023-02-28 PROCEDURE — 97112 NEUROMUSCULAR REEDUCATION: CPT

## 2023-02-28 PROCEDURE — 80048 BASIC METABOLIC PNL TOTAL CA: CPT

## 2023-02-28 PROCEDURE — 2060000000 HC ICU INTERMEDIATE R&B

## 2023-02-28 PROCEDURE — 6370000000 HC RX 637 (ALT 250 FOR IP): Performed by: NURSE PRACTITIONER

## 2023-02-28 PROCEDURE — 99213 OFFICE O/P EST LOW 20 MIN: CPT

## 2023-02-28 PROCEDURE — 85025 COMPLETE CBC W/AUTO DIFF WBC: CPT

## 2023-02-28 PROCEDURE — 6360000002 HC RX W HCPCS: Performed by: NURSE PRACTITIONER

## 2023-02-28 PROCEDURE — 36415 COLL VENOUS BLD VENIPUNCTURE: CPT

## 2023-02-28 PROCEDURE — 97110 THERAPEUTIC EXERCISES: CPT

## 2023-02-28 PROCEDURE — 2580000003 HC RX 258: Performed by: NURSE PRACTITIONER

## 2023-02-28 RX ADMIN — COLLAGENASE SANTYL: 250 OINTMENT TOPICAL at 08:54

## 2023-02-28 RX ADMIN — SODIUM HYPOCHLORITE: 1.25 SOLUTION TOPICAL at 08:54

## 2023-02-28 RX ADMIN — SILVER SULFADIAZINE: 10 CREAM TOPICAL at 08:54

## 2023-02-28 RX ADMIN — FAMOTIDINE 20 MG: 20 TABLET ORAL at 08:57

## 2023-02-28 RX ADMIN — LINEZOLID 600 MG: 600 TABLET ORAL at 08:56

## 2023-02-28 RX ADMIN — HEPARIN SODIUM 5000 UNITS: 5000 INJECTION INTRAVENOUS; SUBCUTANEOUS at 23:20

## 2023-02-28 RX ADMIN — METRONIDAZOLE 500 MG: 250 TABLET ORAL at 13:37

## 2023-02-28 RX ADMIN — TORSEMIDE 40 MG: 20 TABLET ORAL at 08:57

## 2023-02-28 RX ADMIN — ASPIRIN 81 MG CHEWABLE TABLET 81 MG: 81 TABLET CHEWABLE at 08:57

## 2023-02-28 RX ADMIN — LEVOFLOXACIN 750 MG: 500 TABLET, FILM COATED ORAL at 08:56

## 2023-02-28 RX ADMIN — HEPARIN SODIUM 5000 UNITS: 5000 INJECTION INTRAVENOUS; SUBCUTANEOUS at 13:37

## 2023-02-28 RX ADMIN — SODIUM CHLORIDE, PRESERVATIVE FREE 10 ML: 5 INJECTION INTRAVENOUS at 08:57

## 2023-02-28 ASSESSMENT — ENCOUNTER SYMPTOMS
SINUS PRESSURE: 0
ABDOMINAL PAIN: 0
CHEST TIGHTNESS: 0
EYE REDNESS: 0
NAUSEA: 0
COUGH: 0
SINUS PAIN: 0
EYE ITCHING: 0
WHEEZING: 0
CONSTIPATION: 0
VOMITING: 0
BACK PAIN: 0
EYE PAIN: 0
DIARRHEA: 0
SORE THROAT: 0
SHORTNESS OF BREATH: 1

## 2023-02-28 ASSESSMENT — PAIN SCALES - GENERAL: PAINLEVEL_OUTOF10: 0

## 2023-02-28 NOTE — CONSULTS
Consult received  After vein mapping I will discuss with patient and get access performed this week if patient is agreeable

## 2023-02-28 NOTE — CONSULTS
[facility] Wound Ostomy Continence Nurse  Consult Note       Moira Milligan  AGE: 67 y.o. GENDER: male  : 1950  TODAY'S DATE:  2023    Subjective:     Reason for Evaluation and Assessment: wound care reassessment. Moira Milligan is a 67 y.o. male referred by:   [x] Physician  [] Nursing  [] Other:     Wound Identification:  Wound Type: venous, diabetic, and non-healing surgical  Contributing Factors: edema, diabetes, chronic pressure, decreased mobility, and incontinence of stool        PAST MEDICAL HISTORY        Diagnosis Date    Acid reflux     Acute MI ([facility] ,     Arthritis     Back    Broken teeth     Upper Front    CAD (coronary artery disease)     Sees Dr. Omari Lloyd [facility])     per old chart    Cerebral artery occlusion with cerebral infarction ([facility]     CHF (congestive heart failure) ([facility]     preserved ejection fraction    Chronic back pain     Chronic kidney disease     Stage IV - patient of Dr Juan Miguel Monique    Diabetes mellitus [facility]) Dx 1965    per old chart pt has been diabetic since age 13    Diabetic neuropathy ([facility]     \"in my feet\"    H/O cardiovascular stress test 2016    H/O Doppler ultrasound 2018    Moderate disease of the right lower extremity with an JALEN of 0.72. Moderate to severe disease of the left lower extremity with an JALEN of 0.55.     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    Neuropathy     both feet    PAD (peripheral artery disease) ([address] 2018    PVD (peripheral vascular disease) ([address]     Sick sinus syndrome ([address]     Sleep apnea     \"sleep study 3 yrs ago- could not tolerate the cpap made me too dry\"    Spinal stenosis     Teeth missing     Upper And Lower    Type 2 diabetes mellitus without complication ([facility]     WD-Chronic foot ulcer, left, with necrosis of bone ([facility] 11/12/2021       PAST SURGICAL HISTORY    Past Surgical History:   Procedure Laterality Date    CARDIAC CATHETERIZATION      per old chart done 10/2014    CARDIAC CATHETERIZATION  07/14/2017    with angiography of leg    CARDIAC CATHETERIZATION  11/20/2018    PATENT STENTS OF ALL THREE MAJOR VESSELS    CARDIAC DEFIBRILLATOR PLACEMENT  06/04/2010    [employer] [address] Defibrillator Implanted    COLECTOMY Right 08/26/2016    laparascopic; robotic assisted    COLONOSCOPY  08/04/2016    CORONARY ANGIOPLASTY      \"15 Heart Stents\"    CORONARY ANGIOPLASTY WITH STENT PLACEMENT      per old chart had angio with stent to circumflex and obtuse marginal artery at [facility] 5/2010( old chart also gives hx of stent placement done 2000,2004 and 2005)    DENTAL SURGERY      Teeth Extracted In Past    IR TUNNELED CATHETER PLACEMENT GREATER THAN 5 YEARS  6/14/2021    IR TUNNELED CATHETER PLACEMENT GREATER THAN 5 YEARS 6/14/2021 [facility]    IR TUNNELED CATHETER PLACEMENT GREATER THAN 5 YEARS  11/17/2022    IR TUNNELED CATHETER PLACEMENT GREATER THAN 5 YEARS 11/17/2022 [facility]    PACEMAKER PLACEMENT  06/04/2010    [employer] [address] Defibrillator Implanted    TOE AMPUTATION Right 09/12/2017    Rt 3rd toe    TOE AMPUTATION Right 01/09/2018     Right 5th toe amputation and Toenails trimmed left 2,3,4 and 5th toes    TOE AMPUTATION Left 12/26/2020    LEFT GREAT TOE AMPUTATION performed by Chrissie Larkin MD at [address] Left 7/26/2022    LEFT SECOND TOE AMPUTATION performed by Abdoul Li MD at [address] Right 10/20/2022    Right First TOE AMPUTATION performed by Abdoul Li MD at [address] [place] [address]      per old chart had balloon angioplasty right superfical femoral artery,right popliteal artery,,right ant.tibial artery, right tibioperoneal trunk, and right post.tibial artery wna stent placement right popliteal artery and superfical femoral artery 7/2012       FAMILY HISTORY    Family History   Problem Relation Age of Onset    Diabetes Mother     Stroke Mother     High Blood Pressure Mother     Vision Loss Mother     Cancer Father         Prostate Cancer    Diabetes Sister     Neuropathy Sister     Other Sister         \"Breathing Problems\"    Heart Disease Sister     Early Death Sister 62        Heart Complications    Cancer Brother         \"Stomach Cancer\"    High Blood Pressure Brother     Diabetes Brother     Heart Disease Brother     High Blood Pressure Brother     Cancer Son         \"Testicle Cancer\"       SOCIAL HISTORY    Social History     Tobacco Use    Smoking status: Some Days     Types: Cigars    Smokeless tobacco: Never    Tobacco comments:     Client states he has stopped smoking   Vaping Use    Vaping Use: Never used   Substance Use Topics    Alcohol use: No    Drug use: Yes     Types: Marijuana (Weed)       ALLERGIES    Allergies   Allergen Reactions    Pcn [Penicillins] Hives    Fentanyl Itching       MEDICATIONS    No current facility-administered medications on file prior to encounter. Current Outpatient Medications on File Prior to Encounter   Medication Sig Dispense Refill    gentamicin (GARAMYCIN) 0.1 % cream Apply topically 3 times daily 60 g 3    torsemide (DEMADEX) 100 MG tablet TAKE 1 TABLET BY MOUTH TWICE A DAY IN THE MORNING AND IN THE EVENING 60 tablet 3    gabapentin (NEURONTIN) 300 MG capsule Take 300 mg by mouth in the morning and at bedtime.       atorvastatin (LIPITOR) 40 MG tablet Take 1 tablet by mouth nightly (Patient not taking: No sig reported) 30 tablet 3    carvedilol (COREG) 25 MG tablet Take 1 tablet by mouth 2 times daily 60 tablet 0    clopidogrel (PLAVIX) 75 MG tablet Take 1 tablet by mouth daily 30 tablet 1 amLODIPine (NORVASC) 10 MG tablet Take 1 tablet by mouth daily 30 tablet 1    empagliflozin (JARDIANCE) 10 MG tablet Take 1 tablet by mouth daily (Patient not taking: No sig reported) 30 tablet 1    metOLazone (ZAROXOLYN) 2.5 MG tablet Take 1 tablet by mouth in the morning and at bedtime 30 tablet 0    sodium hypochlorite (DAKINS) 0.125 % SOLN external solution Apply topically daily 1 each 0    aspirin 81 MG chewable tablet Take 1 tablet by mouth daily 30 tablet 3    isosorbide mononitrate (IMDUR) 30 MG extended release tablet Take 1 tablet by mouth daily (Patient not taking: No sig reported) 30 tablet 3    magnesium oxide (MAG-OX) 400 (240 Mg) MG tablet Take 1 tablet by mouth 2 times daily 30 tablet 0    SPIRIVA HANDIHALER 18 MCG inhalation capsule Inhale 18 mcg into the lungs daily (Patient not taking: No sig reported)      albuterol sulfate HFA (VENTOLIN HFA) 108 (90 Base) MCG/ACT inhaler Inhale 2 puffs into the lungs every 4 hours as needed for Wheezing 1 Inhaler 3         Objective:      BP (!) 151/70   Pulse 69   Temp 98 °F (36.7 °C) (Axillary)   Resp 18   Ht 6' (1.829 m)   Wt 235 lb 10.8 oz (106.9 kg)   SpO2 95%   BMI 31.96 kg/m²   Pablo Risk Score: Pablo Scale Score: 11    LABS    CBC:   Lab Results   Component Value Date/Time    WBC 6.9 02/28/2023 07:23 AM    RBC 3.07 02/28/2023 07:23 AM    HGB 8.0 02/28/2023 07:23 AM    HCT 28.1 02/28/2023 07:23 AM    MCV 91.5 02/28/2023 07:23 AM    MCH 26.1 02/28/2023 07:23 AM    MCHC 28.5 02/28/2023 07:23 AM    RDW 19.2 02/28/2023 07:23 AM     02/28/2023 07:23 AM    MPV 9.2 02/28/2023 07:23 AM     CMP:    Lab Results   Component Value Date/Time     02/28/2023 07:23 AM    K 3.9 02/28/2023 07:23 AM    K 5.1 01/10/2018 04:32 AM     02/28/2023 07:23 AM    CO2 35 02/28/2023 07:23 AM    BUN 61 02/28/2023 07:23 AM    CREATININE 3.9 02/28/2023 07:23 AM    GFRAA 25 10/17/2022 06:50 AM    LABGLOM 16 02/28/2023 07:23 AM    GLUCOSE 100 02/28/2023 07:23 AM PROT 6.3 02/25/2023 06:30 AM    PROT 6.3 01/21/2013 12:10 PM    LABALBU 2.7 02/25/2023 06:30 AM    CALCIUM 8.2 02/28/2023 07:23 AM    BILITOT 0.5 02/25/2023 06:30 AM    ALKPHOS 59 02/25/2023 06:30 AM    AST 10 02/25/2023 06:30 AM    ALT 6 02/25/2023 06:30 AM     Albumin:    Lab Results   Component Value Date/Time    LABALBU 2.7 02/25/2023 06:30 AM     PT/INR:    Lab Results   Component Value Date/Time    PROTIME 17.3 02/27/2023 08:00 AM    PROTIME 11.2 09/08/2011 06:02 PM    INR 1.34 02/27/2023 08:00 AM     HgBA1c:    Lab Results   Component Value Date/Time    LABA1C 7.1 10/12/2022 06:28 AM         Assessment:     Patient Active Problem List   Diagnosis    PAD (peripheral artery disease) ([facility])    Chronic coronary artery disease    Biventricular ICD (implantable cardioverter-defibrillator) in place    Chronic combined systolic and diastolic heart failure ([facility])    Chronic kidney disease, stage III (moderate) ([facility])    Mixed hyperlipidemia    Sick sinus syndrome ([facility])    Type 2 diabetes mellitus with diabetic polyneuropathy ([facility])    Spinal stenosis of lumbar region    Obesity, Class I, BMI 30-34.9    S/P partial colectomy    Tubulovillous adenoma of colon    Microalbuminuria    WD-PVD (peripheral vascular disease) ([facility])    Limb ischemia    Necrotic toes ([facility])    Toe gangrene ([facility])    Diabetic foot infection ([facility]    Chronic kidney disease (CKD) stage G3a/A2, moderately decreased glomerular filtration rate (GFR) between 45-59 mL/min/1.73 square meter and albuminuria creatinine ratio between  mg/g ([facility])    Edema    ICD (implantable cardioverter-defibrillator) battery depletion    Hyperkalemia    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[facility])    Ischemia of toe    Acute kidney injury ([facility]    Fluid overload    DM (diabetes mellitus) ([facility])    Precordial pain    Acute chest pain    Unstable angina ([facility])    Chronic kidney disease (CKD) stage G3a/A3, moderately decreased glomerular filtration rate (GFR) between 45-59 mL/min/1.73 square meter and albuminuria creatinine ratio greater than 300 mg/g (HCC)    Cardiomyopathy (HCC)    Diabetic neuropathy (HCC)    HTN (hypertension)    Epigastric pain    Acute on chronic congestive heart failure (HCC)    Leg edema    Acute on chronic systolic CHF (congestive heart failure) (HCC)    Diabetic foot ulcer with osteomyelitis ([address]    Visit for wound check    Moderate malnutrition ([address]    Long term (current) use of antibiotics    WD-Diabetic ulcer of toe of right foot associated with type 2 diabetes mellitus, with fat layer exposed ([address]    Diabetic ulcer of toe associated with type 2 diabetes mellitus, with bone involvement without evidence of necrosis ([address]    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    Persistent wound pain    Receiving intravenous antibiotic treatment as outpatient    Acute on chronic respiratory failure with hypoxemia ([address]    WD-Chronic foot ulcer, left, with necrosis of bone (HCC)    Acute on chronic HFrEF (heart failure with reduced ejection fraction) ([facility])    Scrotal edema    Hypertensive urgency    Diabetic ulcer of right foot due to type 2 diabetes mellitus ([address]    Cellulitis of foot    Toe osteomyelitis ([facility])    Heart failure exacerbated by sotalol ([facility])    CHF (congestive heart failure), NYHA class I, acute on chronic, combined (HCC)    Acute on chronic diastolic CHF (congestive heart failure) (HCC)    Leg swelling    Acute on chronic diastolic heart failure (HCC)    Skin ulcer of left foot including toes with fat layer exposed ([address]    Superficial incisional surgical site infection    Bacteremia due to Enterococcus    Acute respiratory failure with hypoxia and hypercapnia (HCC)    Acute kidney injury superimposed on CKD ([address]    Diabetic foot ([address]    Diabetic ulcer of midfoot associated with diabetes mellitus due to underlying condition, with muscle involvement without evidence of necrosis ([address]    Other seizures ([address]    Ulcer of both feet with fat layer exposed ([address]       Measurements:  Wound 07/06/17 Other (Comment) Toe (Comment  which one) (Active)   Number of days: 2062       Wound 12/10/20 Foot Left;Lateral fluid filled blister (Active)   Number of days: 809       Wound 10/30/21   #1 Left Dorsal Great Toe amp site  (Active)   Number of days: 486       Wound 10/30/21   #2 Left Dorsal Second Toe  (Active)   Number of days: 680       Wound 10/30/21 #3 Right Great Toe (Active)   Number of days: 814       Wound 02/25/22 #7 Right Dorsal 2nd Toe (Active)   Number of days: 945       Wound 11/11/22 Ankle Right;Medial (Active)   Number of days: 109       Wound 11/11/22 Toe (Comment  which one) Anterior;Right great toe amputation site (Active)   Number of days: 109       Wound 02/09/23 #1 Right Knee (Active)   Wound Image   02/17/23 1050   Wound Etiology Other 02/26/23 2005   Dressing Status Intact;Dry;Clean 02/27/23 1706   Wound Cleansed Cleansed with saline 02/26/23 1037   Dressing/Treatment Open to air 02/28/23 0830   Offloading for Diabetic Foot Ulcers Offloading not required 02/20/23 1600   Dressing Change Due 03/01/23 02/26/23 1037   Wound Length (cm) 0 cm 02/28/23 0830   Wound Width (cm) 0 cm 02/28/23 0830   Wound Depth (cm) 0 cm 02/28/23 0830   Wound Surface Area (cm^2) 0 cm^2 02/28/23 0830   Change in Wound Size % (l*w) 100 02/28/23 0830   Wound Volume (cm^3) 0 cm^3 02/28/23 0830   Wound Healing % 100 02/28/23 0830   Post-Procedure Length (cm) 1 cm 02/09/23 1214   Post-Procedure Width (cm) 1.5 cm 02/09/23 1214   Post-Procedure Depth (cm) 0.1 cm 02/09/23 1214   Post-Procedure Surface Area (cm^2) 1.5 cm^2 02/09/23 1214   Post-Procedure Volume (cm^3) 0.15 cm^3 02/09/23 1214   Distance Tunneling (cm) 0 cm 02/28/23 0830   Tunneling Position ___ O'Clock 0 02/28/23 0830   Undermining Starts ___ O'Clock 0 02/28/23 0830   Undermining Ends___ O'Clock 0 02/28/23 0830   Undermining Maxium Distance (cm) 0 02/28/23 0830   Wound Assessment Pink/red;Dry 02/28/23 0830   Drainage Amount None 02/28/23 0830   Drainage Description Serosanguinous 02/24/23 1830   Odor None 02/26/23 1037   Dina-wound Assessment Intact 02/26/23 1037   Margins Attached edges 02/28/23 0830   Wound Thickness Description not for Pressure Injury Full thickness 02/26/23 1037   Number of days: 19       Wound 02/09/23 #2 Right Medial Lower Leg Cluster (Active)   Wound Image   02/28/23 0830   Wound Etiology Venous 02/28/23 0830   Dressing Status New dressing applied 02/28/23 0830   Wound Cleansed Cleansed with saline 02/28/23 0830   Dressing/Treatment ABD;Roll gauze 02/28/23 0830   Offloading for Diabetic Foot Ulcers Other (comment) 02/20/23 1600   Dressing Change Due 02/27/23 02/26/23 1037   Wound Length (cm) 0.5 cm 02/28/23 0830   Wound Width (cm) 0.5 cm 02/28/23 0830   Wound Depth (cm) 0.1 cm 02/28/23 0830   Wound Surface Area (cm^2) 0.25 cm^2 02/28/23 0830   Change in Wound Size % (l*w) 99.9 02/28/23 0830   Wound Volume (cm^3) 0.025 cm^3 02/28/23 0830   Wound Healing % 100 02/28/23 0830   Post-Procedure Length (cm) 20 cm 02/09/23 1214   Post-Procedure Width (cm) 12.5 cm 02/09/23 1214   Post-Procedure Depth (cm) 0.1 cm 02/09/23 1214   Post-Procedure Surface Area (cm^2) 250 cm^2 02/09/23 1214   Post-Procedure Volume (cm^3) 25 cm^3 02/09/23 1214   Distance Tunneling (cm) 0 cm 02/28/23 0830   Tunneling Position ___ O'Clock 0 02/28/23 0830   Undermining Starts ___ O'Clock 0 02/28/23 0830   Undermining Ends___ O'Clock 0 02/28/23 0830   Undermining Maxium Distance (cm) 0 02/28/23 0830   Wound Assessment Pink/red 02/28/23 0830   Drainage Amount Small 02/28/23 0830   Drainage Description Serosanguinous 02/28/23 0830   Odor None 02/28/23 0830   Dina-wound Assessment Dry/flaky 02/28/23 0830   Margins Defined edges 02/28/23 0830   Wound Thickness Description not for Pressure Injury Partial thickness 02/28/23 0830   Number of days: 19       Wound 02/09/23 #3 Right Great toe amp site (Active)   Wound Image   02/28/23 0830   Wound Etiology Surgical 02/28/23 0830   Dressing Status New dressing applied 02/28/23 0830   Wound Cleansed Cleansed with saline 02/28/23 0830   Dressing/Treatment ABD;Roll gauze 02/28/23 0830   Offloading for Diabetic Foot Ulcers Other (comment) 02/27/23 1706   Dressing Change Due 02/27/23 02/26/23 1037   Wound Length (cm) 2.5 cm 02/28/23 0830   Wound Width (cm) 4.5 cm 02/28/23 0830   Wound Depth (cm) 1.5 cm 02/28/23 0830   Wound Surface Area (cm^2) 11.25 cm^2 02/28/23 0830   Change in Wound Size % (l*w) 43.75 02/28/23 0830   Wound Volume (cm^3) 16.875 cm^3 02/28/23 0830   Wound Healing % 69 02/28/23 0830   Post-Procedure Length (cm) 4 cm 02/09/23 1214   Post-Procedure Width (cm) 5 cm 02/09/23 1214   Post-Procedure Depth (cm) 2.7 cm 02/09/23 1214   Post-Procedure Surface Area (cm^2) 20 cm^2 02/09/23 1214   Post-Procedure Volume (cm^3) 54 cm^3 02/09/23 1214   Distance Tunneling (cm) 0 cm 02/28/23 0830   Tunneling Position ___ O'Clock 0 02/28/23 0830   Undermining Starts ___ O'Clock 0 02/28/23 0830   Undermining Ends___ O'Clock 0 02/28/23 0830   Undermining Maxium Distance (cm) 0 02/28/23 0830   Wound Assessment Slough;[place]/red 02/28/23 0830   Drainage Amount Moderate 02/28/23 0830   Drainage Description Serosanguinous; Yellow 02/28/23 0830   Odor None 02/28/23 0830   Dina-wound Assessment Intact 02/28/23 0830   Margins Defined edges 02/28/23 0830   Wound Thickness Description not for Pressure Injury Full thickness 02/28/23 0830   Number of days: 19       Wound 02/09/23 #4 Right 4th toe (Active)   Wound Image   02/09/23 1126   Wound Etiology Diabetic 02/28/23 0830   Dressing Status New dressing applied 02/27/23 1706   Wound Cleansed Irrigated with saline 02/27/23 1706   Dressing/Treatment Open to air 02/28/23 0830   Offloading for Diabetic Foot Ulcers Other (comment) 02/20/23 1600   Dressing Change Due 02/24/23 02/23/23 1440   Wound Length (cm) 0 cm 02/28/23 0830   Wound Width (cm) 0 cm 02/28/23 0830   Wound Depth (cm) 0 cm 02/28/23 0830   Wound Surface Area (cm^2) 0 cm^2 02/28/23 0830   Change in Wound Size % (l*w) 100 02/28/23 0830   Wound Volume (cm^3) 0 cm^3 02/28/23 0830   Wound Healing % 100 02/28/23 0830   Post-Procedure Length (cm) 0.5 cm 02/09/23 1214   Post-Procedure Width (cm) 0.8 cm 02/09/23 1214   Post-Procedure Depth (cm) 0.1 cm 02/09/23 1214   Post-Procedure Surface Area (cm^2) 0.4 cm^2 02/09/23 1214   Post-Procedure Volume (cm^3) 0.04 cm^3 02/09/23 1214   Distance Tunneling (cm) 0 cm 02/28/23 0830   Tunneling Position ___ O'Clock 0 02/28/23 0830   Undermining Starts ___ O'Clock 0 02/28/23 0830   Undermining Ends___ O'Clock 0 02/28/23 0830   Undermining Maxium Distance (cm) 0 02/28/23 0830   Wound Assessment Pink/red 02/28/23 0830   Drainage Amount None 02/28/23 0830   Drainage Description Serosanguinous 02/27/23 1706   Odor Mild 02/26/23 0208   Dina-wound Assessment Maceration 02/26/23 0208   Margins Attached edges 02/28/23 0830   Wound Thickness Description not for Pressure Injury Full thickness 02/18/23 1845   Number of days: 19       Wound 02/09/23 #5 Left lateral lower leg cluster (Active)   Wound Image   02/28/23 0830   Wound Etiology Venous 02/28/23 0830   Dressing Status New dressing applied 02/28/23 0830   Wound Cleansed Cleansed with saline 02/28/23 0830   Dressing/Treatment Pharmaceutical agent (see MAR);ABD;Roll gauze 02/28/23 0830   Offloading for Diabetic Foot Ulcers Other (comment) 02/20/23 1200   Dressing Change Due 02/27/23 02/26/23 1037   Wound Length (cm) 1.5 cm 02/28/23 0830   Wound Width (cm) 4 cm 02/28/23 0830   Wound Depth (cm) 0.1 cm 02/28/23 0830   Wound Surface Area (cm^2) 6 cm^2 02/28/23 0830   Change in Wound Size % (l*w) 75 02/28/23 0830   Wound Volume (cm^3) 0.6 cm^3 02/28/23 0830   Wound Healing % 75 02/28/23 0830   Post-Procedure Length (cm) 4 cm 02/09/23 1214   Post-Procedure Width (cm) 6 cm 02/09/23 1214   Post-Procedure Depth (cm) 0.1 cm 02/09/23 1214   Post-Procedure Surface Area (cm^2) 24 cm^2 02/09/23 1214   Post-Procedure Volume (cm^3) 2.4 cm^3 02/09/23 1214   Distance Tunneling (cm) 0 cm 02/28/23 0830   Tunneling Position ___ O'Clock 0 02/28/23 0830   Undermining Starts ___ O'Clock 0 02/28/23 0830   Undermining Ends___ O'Clock 0 02/28/23 0830   Undermining Maxium Distance (cm) 0 02/28/23 0830   Wound Assessment Pink/red 02/28/23 0830   Drainage Amount Small 02/28/23 0830   Drainage Description Serosanguinous 02/28/23 0830   Odor None 02/28/23 0830   Dina-wound Assessment Dry/flaky 02/28/23 0830   Margins Undefined edges 02/28/23 0830   Wound Thickness Description not for Pressure Injury Full thickness 02/28/23 0830   Number of days: 19       Wound 02/09/23 #6 Left great toe amp site (Active)   Wound Image   02/28/23 0830   Wound Etiology Surgical 02/28/23 0830   Dressing Status New dressing applied 02/28/23 0830   Wound Cleansed Cleansed with saline 02/28/23 0830   Dressing/Treatment ABD;Roll gauze 02/28/23 0830   Offloading for Diabetic Foot Ulcers Other (comment) 02/20/23 1600   Dressing Change Due 02/27/23 02/26/23 1037   Wound Length (cm) 1 cm 02/28/23 0830   Wound Width (cm) 1.4 cm 02/28/23 0830   Wound Depth (cm) 0.3 cm 02/28/23 0830   Wound Surface Area (cm^2) 1.4 cm^2 02/28/23 0830   Change in Wound Size % (l*w) 76.67 02/28/23 0830   Wound Volume (cm^3) 0.42 cm^3 02/28/23 0830   Wound Healing % 91 02/28/23 0830   Post-Procedure Length (cm) 2 cm 02/09/23 1214   Post-Procedure Width (cm) 3 cm 02/09/23 1214   Post-Procedure Depth (cm) 0.8 cm 02/09/23 1214   Post-Procedure Surface Area (cm^2) 6 cm^2 02/09/23 1214   Post-Procedure Volume (cm^3) 4.8 cm^3 02/09/23 1214   Distance Tunneling (cm) 0 cm 02/28/23 0830   Tunneling Position ___ O'Clock 0 02/28/23 0830   Undermining Starts ___ O'Clock 0 02/28/23 0830   Undermining Ends___ O'Clock 0 02/28/23 0830   Undermining Maxium Distance (cm) 0 02/28/23 0830   Wound Assessment Slough;[place]/red 02/28/23 0830   Drainage Amount Moderate 02/28/23 0830   Drainage Description Serosanguinous; Yellow 02/28/23 0830   Odor None 02/28/23 0830   Dina-wound Assessment Intact;Dry/flaky 02/28/23 0830   Margins Defined edges 02/28/23 0830   Wound Thickness Description not for Pressure Injury Full thickness 02/28/23 0830   Number of days: 19       Wound 02/09/23 #7 Left 2nd toe amp site (Active)   Wound Image   02/28/23 0830   Wound Etiology Deep tissue/Injury 02/28/23 0830   Dressing Status New dressing applied 02/28/23 0830   Wound Cleansed Cleansed with saline 02/28/23 0830   Dressing/Treatment ABD;Roll gauze 02/28/23 0830   Offloading for Diabetic Foot Ulcers Other (comment) 02/18/23 1845   Dressing Change Due 02/27/23 02/26/23 1037   Wound Length (cm) 1.5 cm 02/28/23 0830   Wound Width (cm) 1 cm 02/28/23 0830   Wound Depth (cm) 0.3 cm 02/28/23 0830   Wound Surface Area (cm^2) 1.5 cm^2 02/28/23 0830   Change in Wound Size % (l*w) 75 02/28/23 0830   Wound Volume (cm^3) 0.45 cm^3 02/28/23 0830   Wound Healing % 85 02/28/23 0830   Post-Procedure Length (cm) 3 cm 02/09/23 1214   Post-Procedure Width (cm) 2 cm 02/09/23 1214   Post-Procedure Depth (cm) 0.5 cm 02/09/23 1214   Post-Procedure Surface Area (cm^2) 6 cm^2 02/09/23 1214   Post-Procedure Volume (cm^3) 3 cm^3 02/09/23 1214   Distance Tunneling (cm) 0 cm 02/28/23 0830   Tunneling Position ___ O'Clock 0 02/28/23 0830   Undermining Starts ___ O'Clock 0 02/28/23 0830   Undermining Ends___ O'Clock 0 02/28/23 0830   Undermining Maxium Distance (cm) 0 02/28/23 0830   Wound Assessment Pink/red;Slough 02/28/23 0830   Drainage Amount Moderate 02/28/23 0830   Drainage Description Serosanguinous 02/28/23 0830   Odor None 02/28/23 0830   Dina-wound Assessment Intact 02/28/23 0830   Margins Defined edges 02/28/23 0830   Wound Thickness Description not for Pressure Injury Full thickness 02/28/23 0830   Number of days: 19       Wound 02/09/23 #8 Left 3rd toe (Active)   Wound Image   02/28/23 0830   Wound Etiology Diabetic 02/28/23 0830   Dressing Status New dressing applied 02/28/23 0830   Wound Cleansed Cleansed with saline 02/28/23 0830   Dressing/Treatment ABD;Roll gauze 02/28/23 0830   Offloading for Diabetic Foot Ulcers Other (comment) 02/20/23 1600   Dressing Change Due 02/27/23 02/26/23 1037   Wound Length (cm) 2 cm 02/28/23 0830   Wound Width (cm) 1 cm 02/28/23 0830   Wound Depth (cm) 0.1 cm 02/28/23 0830   Wound Surface Area (cm^2) 2 cm^2 02/28/23 0830   Change in Wound Size % (l*w) 85.71 02/28/23 0830   Wound Volume (cm^3) 0.2 cm^3 02/28/23 0830   Wound Healing % 86 02/28/23 0830   Post-Procedure Length (cm) 4 cm 02/09/23 1214   Post-Procedure Width (cm) 3.5 cm 02/09/23 1214   Post-Procedure Depth (cm) 0.1 cm 02/09/23 1214   Post-Procedure Surface Area (cm^2) 14 cm^2 02/09/23 1214   Post-Procedure Volume (cm^3) 1.4 cm^3 02/09/23 1214   Distance Tunneling (cm) 0 cm 02/28/23 0830   Tunneling Position ___ O'Clock 0 02/28/23 0830   Undermining Starts ___ O'Clock 0 02/28/23 0830   Undermining Ends___ O'Clock 0 02/28/23 0830   Undermining Maxium Distance (cm) 0 02/28/23 0830   Wound Assessment Pink/red 02/28/23 0830   Drainage Amount Small 02/28/23 0830   Drainage Description Serosanguinous 02/28/23 0830   Odor None 02/28/23 0830   Dina-wound Assessment Intact 02/28/23 0830   Margins Defined edges 02/28/23 0830   Wound Thickness Description not for Pressure Injury Full thickness 02/28/23 0830   Number of days: 19       Wound 02/09/23 #9 Left 4th toe (Active)   Wound Image   02/17/23 1050   Wound Etiology Diabetic 02/28/23 0830   Dressing Status New dressing applied 02/28/23 0830   Wound Cleansed Cleansed with saline 02/28/23 0830   Dressing/Treatment ABD;Roll gauze 02/28/23 0830   Offloading for Diabetic Foot Ulcers Other (comment) 02/20/23 1600   Dressing Change Due 02/27/23 02/26/23 1037   Wound Length (cm) 0 cm 02/28/23 0830   Wound Width (cm) 0 cm 02/28/23 0830   Wound Depth (cm) 0 cm 02/28/23 0830   Wound Surface Area (cm^2) 0 cm^2 02/28/23 0830   Change in Wound Size % (l*w) 100 02/28/23 0830   Wound Volume (cm^3) 0 cm^3 02/28/23 0830   Wound Healing % 100 02/28/23 0830   Post-Procedure Length (cm) 4 cm 02/09/23 1214   Post-Procedure Width (cm) 3.4 cm 02/09/23 1214   Post-Procedure Depth (cm) 0.1 cm 02/09/23 1214   Post-Procedure Surface Area (cm^2) 13.6 cm^2 02/09/23 1214   Post-Procedure Volume (cm^3) 1.36 cm^3 02/09/23 1214   Distance Tunneling (cm) 0 cm 02/28/23 0830   Tunneling Position ___ O'Clock 0 02/28/23 0830   Undermining Starts ___ O'Clock 0 02/28/23 0830   Undermining Ends___ O'Clock 0 02/28/23 0830   Undermining Maxium Distance (cm) 0 02/28/23 0830   Wound Assessment Eschar dry 02/28/23 0830   Drainage Amount None 02/28/23 0830   Drainage Description Serous;Purulent 02/24/23 1830   Odor None 02/28/23 0830   Dina-wound Assessment Intact; Warm 02/26/23 1037   Margins Attached edges 02/28/23 0830   Wound Thickness Description not for Pressure Injury Full thickness 02/28/23 0830   Number of days: 19       Wound 02/13/23 Knee Anterior; Left (Active)   Wound Image   02/28/23 0830   Wound Etiology Other 02/28/23 0830   Dressing Status New dressing applied 02/28/23 0830   Wound Cleansed Cleansed with saline 02/28/23 0830   Dressing/Treatment Silicone border 19/93/94 0830   Dressing Change Due 03/01/23 02/26/23 1037   Wound Length (cm) 0.9 cm 02/28/23 0830   Wound Width (cm) 1.5 cm 02/28/23 0830   Wound Depth (cm) 0.1 cm 02/28/23 0830   Wound Surface Area (cm^2) 1.35 cm^2 02/28/23 0830   Change in Wound Size % (l*w) -12.5 02/28/23 0830   Wound Volume (cm^3) 0.135 cm^3 02/28/23 0830   Wound Healing % -13 02/28/23 0830   Distance Tunneling (cm) 0 cm 02/28/23 0830   Tunneling Position ___ O'Clock 0 02/28/23 0830   Undermining Starts ___ O'Clock 0 02/28/23 0830   Undermining Ends___ O'Clock 0 02/28/23 0830   Undermining Maxium Distance (cm) 0 02/28/23 0830   Wound Assessment Pink/red 02/28/23 0830   Drainage Amount Small 02/28/23 0830   Drainage Description Serosanguinous 02/28/23 0830 Odor None 02/28/23 0830   Dina-wound Assessment Intact 02/28/23 0830   Margins Defined edges 02/28/23 0830   Wound Thickness Description not for Pressure Injury Full thickness 02/28/23 0830   Number of days: 15       Wound 02/13/23 Right;Plantar heel (Active)   Wound Image   02/28/23 0830   Wound Etiology Diabetic 02/28/23 0830   Dressing Status New dressing applied 02/28/23 0830   Wound Cleansed Cleansed with saline 02/28/23 0830   Dressing/Treatment ABD;Roll gauze 02/28/23 0830   Offloading for Diabetic Foot Ulcers Other (comment) 02/27/23 1706   Dressing Change Due 02/24/23 02/23/23 1440   Wound Length (cm) 0 cm 02/28/23 0830   Wound Width (cm) 0 cm 02/28/23 0830   Wound Depth (cm) 0 cm 02/28/23 0830   Wound Surface Area (cm^2) 0 cm^2 02/28/23 0830   Change in Wound Size % (l*w) 100 02/28/23 0830   Wound Volume (cm^3) 0 cm^3 02/28/23 0830   Wound Healing % 100 02/28/23 0830   Distance Tunneling (cm) 0 cm 02/28/23 0830   Tunneling Position ___ O'Clock 0 02/28/23 0830   Undermining Starts ___ O'Clock 0 02/28/23 0830   Undermining Ends___ O'Clock 0 02/28/23 0830   Undermining Maxium Distance (cm) 0 02/28/23 0830   Wound Assessment Pink/red 02/28/23 0830   Drainage Amount None 02/28/23 0830   Drainage Description Serosanguinous 02/24/23 1830   Odor None 02/24/23 1830   Dina-wound Assessment Intact 02/20/23 1600   Margins Attached edges 02/28/23 0830   Wound Thickness Description not for Pressure Injury Partial thickness 02/20/23 1600   Number of days: 15       Wound 02/28/23 Buttocks Left (Active)   Wound Image   02/28/23 0830   Wound Etiology Pressure Stage 2 02/28/23 0830   Dressing Status New dressing applied 02/28/23 0830   Wound Cleansed Cleansed with saline 02/28/23 0830   Dressing/Treatment Collagen;Silicone border 08/74/01 0830   Wound Length (cm) 0.7 cm 02/28/23 0830   Wound Width (cm) 0.7 cm 02/28/23 0830   Wound Depth (cm) 0.1 cm 02/28/23 0830   Wound Surface Area (cm^2) 0.49 cm^2 02/28/23 0830   Wound Volume (cm^3) 0.049 cm^3 02/28/23 0830   Distance Tunneling (cm) 0 cm 02/28/23 0830   Tunneling Position ___ O'Clock 0 02/28/23 0830   Undermining Starts ___ O'Clock 0 02/28/23 0830   Undermining Ends___ O'Clock 0 02/28/23 0830   Undermining Maxium Distance (cm) 0 02/28/23 0830   Wound Assessment Pink/red 02/28/23 0830   Drainage Amount Small 02/28/23 0830   Drainage Description Serosanguinous 02/28/23 0830   Odor None 02/28/23 0830   Margins Defined edges 02/28/23 0830   Wound Thickness Description not for Pressure Injury Partial thickness 02/28/23 0830   Number of days: 0       Response to treatment:  Well tolerated by patient.     Pain Assessment:  Severity:  none  Quality of pain:   Wound Pain Timing/Severity:   Premedicated: no    Plan:     Plan of Care: Wound 02/13/23 Knee Anterior;Left-Dressing/Treatment: Silicone border  [REMOVED] Wound 02/13/23 Toe (Comment  which one) Left 4th-Dressing/Treatment: Alginate with Ag, ABD, Roll gauze, Tape/Soft cloth adhesive tape  Wound 02/13/23 Right;Plantar heel-Dressing/Treatment: ABD, Roll gauze  Wound 02/28/23 Buttocks Left-Dressing/Treatment: Collagen, Silicone border  Wound 02/09/23 #1 Right Knee-Dressing/Treatment: Open to air  Wound 02/09/23 #2 Right Medial Lower Leg Cluster-Dressing/Treatment: ABD, Roll gauze (silvadene)  Wound 02/09/23 #3 Right Great toe amp site-Dressing/Treatment: ABD, Roll gauze (santyl, dakins moist gauze)  Wound 02/09/23 #4 Right 4th toe-Dressing/Treatment: Open to air  Wound 02/09/23 #5 Left lateral lower leg cluster-Dressing/Treatment: Pharmaceutical agent (see MAR), ABD, Roll gauze (silvadene)  Wound 02/09/23 #6 Left great toe amp site-Dressing/Treatment: ABD, Roll gauze (santyl,dakins moist gauze)  Wound 02/09/23 #7 Left 2nd toe amp site-Dressing/Treatment: ABD, Roll gauze (santyl dakins damp gauze)  Wound 02/09/23 #8 Left 3rd toe-Dressing/Treatment: ABD, Roll gauze (santyl dakins damp gauze)  Wound 02/09/23 #9 Left 4th  toe-Dressing/Treatment: ABD, Roll gauze    Patient in bed agreeable to wound care reassessment. Pt has chronic diabetic/venous/surgical amp site wounds. Cleansed with NS. Measured and pictured. Applied santyl and dakins moist gauze to rt great toe amp site/lt great toe amp site/lt 2nd toe amp site. Leg wounds applied silvadene cream abd kerlix. Legs dry/flaky. Washed with bath oil. Lt buttocks with a pressure wound stage 2. Cleansed with NS measured and pictured. Applied foam border. Pt is incontinent of stool. Dina care done. Pt turned to rt side with pillow support. Pt is at high risk for skin breakdown AEB brett. Follow brett orders. Specialty Bed Required : yes  [x] Low Air Loss   [] Pressure Redistribution  [] Fluid Immersion  [] Bariatric  [] Total Pressure Relief  [] Other:     Discharge Plan:  Placement for patient upon discharge: tbd  Hospice Care: no  Patient appropriate for Outpatient [address]: [facility]    Patient/Caregiver Teaching:  Level of patient/caregiver understanding able to: pt voiced understanding. Electronically signed by Taisha Klein.  IFEOMA Miranda, on 2/28/2023 at 12:41 PM

## 2023-02-28 NOTE — PROGRESS NOTES
[id number] [facility] SPEECH/LANGUAGE PATHOLOGY  DAILY PROGRESS NOTE  Lewis Morillo  2/28/2023  [id number]  Hyperkalemia [E87.5]  Elevated troponin [R77.8]  Acidosis, metabolic, with respiratory acidosis [E87.4]  Acute respiratory failure with hypoxia and hypercapnia (HCC) [J96.01, J96.02]  Acute on chronic heart failure, unspecified heart failure type (HCC) [I50.9]  Acute kidney injury superimposed on CKD ([facility] [address] [N17.9, N18.9]  Acute kidney injury superimposed on chronic kidney disease ([facility] [address] [N17.9, N18.9]  Allergies   Allergen Reactions    Pcn [Penicillins] Hives    Fentanyl Itching         Pt was seen this date for dysphagia treatment. IMPRESSION AND RECOMMENDATIONS:   Lewis Morillo was seen for swallow treatment seated upright in bed, pt was alert, cooperative, pleasant. Pt seen with PO trials of ice chips puree and thin liquids via cup. Oral phase was mildly impaired characterized by intact labial seal, oral holding of puree and liquids for about 1-2 seconds. Pharyngeal phase appears mildly impaired characterized by delayed swallow initiation. Thin liquid wash provided after puree, pt with noted excessive coughing, watery eyes, and runny nose. Pt also with noted coughing after attempting to speak with ice chip in mouth. Reviewed aspiration precautions. Recommend initiate [employer] protocol for thin water. Continue moist and mince diet and continue nectar thick liquids. Recommend continue feed assist. Recommend pills crushed in puree as able. SLP will continue to follow for monitoring of diet tolerance and po trials of advanced diet. Results/recommendations d/w pt, and nurse.        GOALS (current status in bold):  Short-term Goals  Timeframe for Short-term Goals: Length of stay  Goal 1: Pt will tolerate soft and bite-sized diet and nectar thick liquids with no s/s of aspiration moist and minced and tolerating NTL  Goal 2: Pt will tolerate PO trials of advanced diet with no s/s of aspiration progressing, cont. Goal 3: Pt/caregivers will demonstrate understanding of recommendations and POC progressing, cont.                             EDUCATION: [employer] water protocol and results/recommendation and POC    PAIN RATING (0-10 Scale): n/a  Time in/Time out: SLP Individual Minutes  Minutes: 20    Visit number: [id number] [facility]   2/28/2023  11:42 AM

## 2023-02-28 NOTE — PROGRESS NOTES
V2.0  [facility] Hospitalist Progress Note      Name:  Tegan Adamson /Age/Sex: 1950  (67 y.o. male)   MRN & CSN:  [id number] & [id number] Encounter Date/Time: 2023 6:51 PM EST    Location:  -A PCP: Zeferino Villanueva Day: 18    Assessment and Plan:   Tegan Adamson is a 67 y.o. male with pmh of  CAD, HFpEF, ICD, CKD, DM who presents with Acute respiratory failure with hypoxia and hypercapnia ([facility] [address]    Plan:    Acute hypoxic Hypercapneic respiratory failure with Pneumonia: was initially placed on BIPAP but later was intubated on MV was started on Empiric broad spectrum. CXR (): worsening R base opacity and small L pleural effusion. Now extubated (2023) and on 3L/min today. Wean O2 as able. On linezolid, levofloxacin and metronidazole to complete a full course. Cardiac arrest due to hypoxia: underwent CPR after pulse became thready after patient was reintubated when he was not being ventilated. Chest tube was also placed. Acute renal failure with h/o CKD stage IV: was initially started on HD but could not tolerate. CRRT stopped. Follow up on nephro recs. Patient urinating after HD. Plan to perform access surgery this admission. Hyperkalemia-resolved: in the setting of above, improving  Elevated troponin: likely type 2 MI in the setting of respiratory as well as kidney failure, does have h/o CAD with multiple stents, Cardio on board. HFpEF:  EF 50-55% in . Repeat echo with simillar EF, hypokinetic RV with bowing of Interventricular septum towards LV. Chronic LE wounds: Wound care on board. Likely infected, Purulent drainage from RLE 1st metatarsal which is foul smelling. General surgery on consult, appreciate recs. Wound cultures sent - growing Pseudomonas aeruginosa, Citrobacter freundii MRSA, Enterococcus faecalis, Bacteroides thetaiotaomicron. On linezolid, levofloxacin, and metronidazole.   Infectious disease has been consulted and will defer antibiotic choices to them. Class II Obesity. BMI 35.82  Type II DM. Monitor off of insulin. Could benefit from orals at NC, but be wary of anything causes hypoglycemia. History of urethral stricture with urinary retention. S/p Fuller catheter insertion. Plan to remove Fuller catheter and give voiding trial.  Patient refused voiding trial today. Will attempt tomorrow. Diet ADULT ORAL NUTRITION SUPPLEMENT; Lunch, Dinner; Diabetic Oral Supplement  ADULT DIET; Dysphagia - Minced and Moist; Mildly Thick (Nectar); No Drinking Straws   DVT Prophylaxis [] Lovenox, [x]  Heparin, [] SCDs, [] Ambulation,  [] Eliquis, [] Xarelto  [] Coumadin   Code Status Full Code   Disposition From: Home  Expected Disposition: TBD  Estimated Date of Discharge: 2-4 days  Patient requires continued admission due to Respiratory and renal failure   Surrogate Decision Maker/ POA      Subjective:     Chief Complaint: Respiratory Distress     Seen and examined in the morning. Has no new complaints. Discussed about removing Fuller and giving him a voiding trial.  He refused it and wants to consider it tomorrow despite of education given. Endorses poor as he does not like food here. Review of Systems:    Review of Systems   Constitutional:  Negative for appetite change, chills, fatigue, fever and unexpected weight change. HENT:  Negative for sinus pressure, sinus pain and sore throat. Eyes:  Negative for pain, redness and itching. Respiratory:  Positive for shortness of breath. Negative for cough, chest tightness and wheezing. Cardiovascular:  Negative for chest pain, palpitations and leg swelling. Gastrointestinal:  Negative for abdominal pain, constipation, diarrhea, nausea and vomiting. Endocrine: Negative for polydipsia, polyphagia and polyuria. Genitourinary:  Negative for decreased urine volume, difficulty urinating, dysuria, frequency, hematuria and urgency. Musculoskeletal:  Positive for myalgias.  Negative for arthralgias and back pain. Skin:  Positive for wound. Negative for pallor and rash. Neurological:  Negative for dizziness, tremors, seizures, syncope, weakness, light-headedness, numbness and headaches. Psychiatric/Behavioral:  Negative for agitation and confusion. Objective: Intake/Output Summary (Last 24 hours) at 2/28/2023 1408  Last data filed at 2/28/2023 0524  Gross per 24 hour   Intake --   Output 1875 ml   Net -1875 ml          Vitals:   Vitals:    02/28/23 0946   BP:    Pulse:    Resp:    Temp:    SpO2: 95%       Physical Exam:   Physical Exam  Vitals and nursing note reviewed. Constitutional:       General: He is not in acute distress. Appearance: He is obese. He is not ill-appearing, toxic-appearing or diaphoretic. Interventions: Nasal cannula in place. Comments: 4L/min   HENT:      Head: Normocephalic and atraumatic. Right Ear: External ear normal.      Left Ear: External ear normal.      Nose: Nose normal.      Mouth/Throat:      Mouth: Mucous membranes are moist.      Pharynx: Oropharynx is clear. Eyes:      General: No scleral icterus. Right eye: No discharge. Left eye: No discharge. Conjunctiva/sclera: Conjunctivae normal.      Pupils: Pupils are equal, round, and reactive to light. Cardiovascular:      Rate and Rhythm: Normal rate and regular rhythm. Pulses: Normal pulses. Heart sounds: Normal heart sounds. No murmur heard. No friction rub. No gallop. Pulmonary:      Effort: Pulmonary effort is normal. No respiratory distress. Breath sounds: Normal breath sounds. Decreased air movement and transmitted upper airway sounds (Improved) present. No stridor. No decreased breath sounds, wheezing, rhonchi or rales. Abdominal:      General: Bowel sounds are normal. There is no distension. Palpations: Abdomen is soft. There is no mass. Tenderness: There is no abdominal tenderness. There is no guarding or rebound. Hernia: No hernia is present. Musculoskeletal:      Right lower leg: No edema. Left lower leg: No edema. Comments: B/L LE bandaged   Skin:     Capillary Refill: Capillary refill takes less than 2 seconds. Neurological:      Mental Status: He is alert and oriented to person, place, and time.          Medications:   Medications:    torsemide  40 mg Oral Daily    metroNIDAZOLE  500 mg Oral 3 times per day    linezolid  600 mg Oral 2 times per day    silver sulfADIAZINE   Topical Daily    famotidine  20 mg Per NG tube Daily    heparin (porcine)  5,000 Units SubCUTAneous 3 times per day    sodium hypochlorite   Irrigation Daily    collagenase   Topical Daily    sodium chloride flush  5-40 mL IntraVENous 2 times per day    aspirin  81 mg Oral Daily    [Held by provider] clopidogrel  75 mg Oral Daily      Infusions:    dextrose      sodium chloride 10 mL/hr at 02/21/23 0644     PRN Meds: ondansetron, 4 mg, Q6H PRN  ipratropium-albuterol, 1 ampule, Q4H PRN  HYDROmorphone, 0.25 mg, Q4H PRN  albuterol, 2.5 mg, Q2H PRN  heparin flush, 240 Units, PRN  glucose, 4 tablet, PRN  dextrose bolus, 125 mL, PRN   Or  dextrose bolus, 250 mL, PRN  glucagon (rDNA), 1 mg, PRN  dextrose, , Continuous PRN  sodium chloride flush, 10 mL, PRN  sodium chloride, , PRN      Labs      Recent Results (from the past 24 hour(s))   POCT Glucose    Collection Time: 02/27/23  5:36 PM   Result Value Ref Range    POC Glucose 111 (H) 70 - 99 MG/DL   POCT Glucose    Collection Time: 02/27/23  8:25 PM   Result Value Ref Range    POC Glucose 132 (H) 70 - 99 MG/DL   Basic Metabolic Panel w/ Reflex to MG    Collection Time: 02/28/23  7:23 AM   Result Value Ref Range    Sodium 144 135 - 145 MMOL/L    Potassium 3.9 3.5 - 5.1 MMOL/L    Chloride 101 99 - 110 mMol/L    CO2 35 (H) 21 - 32 MMOL/L    Anion Gap 8 4 - 16    BUN 61 (H) 6 - 23 MG/DL    Creatinine 3.9 (H) 0.9 - 1.3 MG/DL    Est, Glom Filt Rate 16 (L) >60 mL/min/1.73m2    Glucose 100 (H) 70 - 99 MG/DL    Calcium 8.2 (L) 8.3 - 10.6 MG/DL   CBC with Auto Differential    Collection Time: 02/28/23  7:23 AM   Result Value Ref Range    WBC 6.9 4.0 - 10.5 K/CU MM    RBC 3.07 (L) 4.6 - 6.2 M/CU MM    Hemoglobin 8.0 (L) 13.5 - 18.0 GM/DL    Hematocrit 28.1 (L) 42 - 52 %    MCV 91.5 78 - 100 FL    MCH 26.1 (L) 27 - 31 PG    MCHC 28.5 (L) 32.0 - 36.0 %    RDW 19.2 (H) 11.7 - 14.9 %    Platelets 174 921 - 620 K/CU MM    MPV 9.2 7.5 - 11.1 FL    Differential Type AUTOMATED DIFFERENTIAL     Segs Relative 76.5 (H) 36 - 66 %    Lymphocytes % 13.4 (L) 24 - 44 %    Monocytes % 6.3 (H) 0 - 4 %    Eosinophils % 2.8 0 - 3 %    Basophils % 0.7 0 - 1 %    Segs Absolute 5.2 K/CU MM    Lymphocytes Absolute 0.9 K/CU MM    Monocytes Absolute 0.4 K/CU MM    Eosinophils Absolute 0.2 K/CU MM    Basophils Absolute 0.1 K/CU MM    Nucleated RBC % 0.0 %    Total Nucleated RBC 0.0 K/CU MM    Total Immature Neutrophil 0.02 K/CU MM    Immature Neutrophil % 0.3 0 - 0.43 %        Imaging/Diagnostics Last 24 Hours   XR CHEST PORTABLE    Result Date: 2/11/2023  EXAMINATION: ONE XRAY VIEW OF THE CHEST 2/11/2023 7:29 pm COMPARISON: Chest radiograph 02/11/2023 HISTORY: ORDERING SYSTEM PROVIDED HISTORY: ETT placement TECHNOLOGIST PROVIDED HISTORY: Reason for exam:->ETT placement Reason for Exam: et and og tube placement confirmation Additional signs and symptoms: et and og tube placement confrimation FINDINGS: Lines and tubes: -ETT terminates at the superior margin of the clavicles. -enteric tube takes a the subdiaphragmatic course and terminates out of the field of view -right-sided Cordis catheter, which terminates within the mid/upper SVC Lungs: There is indistinctness of the pulmonary vasculature with patchy opacities within the right greater than left lungs. . Pleura: Small right-sided pleural effusion. Cardiomediastinal silhouette: Enlarged cardiac silhouette. Bones: No acute osseous findings. Soft tissues: Left-sided 2 lead cardiac device. Lines and tubes as above. The ETT terminates at the level of the superior margin of the clavicles. Grossly unchanged pulmonary exam.  Findings favor cardiogenic pulmonary edema. However a superimposed infectious process cannot be excluded.        Electronically signed by Xuan Chin MD on 2/28/2023 at 2:08 PM

## 2023-02-28 NOTE — PROGRESS NOTES
Occupational Therapy Treatment Note  Name: Annette Retana MRN: [id number] :   1950   Date:  2023   Admission Date: 2023 Room:  -A     Primary Problem:    [place]:  The primary encounter diagnosis was Acute respiratory failure with hypoxia and hypercapnia ([address] Diagnoses of Acute kidney injury superimposed on chronic kidney disease ([address] Hyperkalemia, Acute on chronic heart failure, unspecified heart failure type ([address] Elevated troponin, and Acidosis, metabolic, with respiratory acidosis were also pertinent to this visit. Patient  has a past medical history of Acid reflux, Acute MI ([address] Arthritis, Broken teeth, CAD (coronary artery disease), Cardiomyopathy ([address] Cerebral artery occlusion with cerebral infarction ([address] CHF (congestive heart failure) ([address] Chronic back pain, Chronic kidney disease, Diabetes mellitus ([address] Diabetic neuropathy ([address] H/O cardiovascular stress test, H/O Doppler ultrasound, H/O percutaneous left heart catheterization, History of irregular heartbeat, History of syncope, Hyperlipidemia, Hypertension, Leg swelling, Necrotic toes (HCC), Neuropathy, PAD (peripheral artery disease) ([address] PVD (peripheral vascular disease) ([address] Sick sinus syndrome ([address] Sleep apnea, Spinal stenosis, Teeth missing, Type 2 diabetes mellitus without complication ([address] and WD-Chronic foot ulcer, left, with necrosis of bone ([address] Patient  has a past surgical history that includes Coronary angioplasty with stent; Dental surgery; Colonoscopy (2016); pacemaker placement (2010); vascular surgery; colectomy (Right, 2016); Toe amputation (Right, 2017); Toe amputation (Right, 2018); Cardiac catheterization; Cardiac defibrillator placement (2010); Coronary angioplasty; Cardiac catheterization (2017); Cardiac catheterization (2018); Toe amputation (Left, 2020); IR TUNNELED CVC PLACE WO SQ PORT/PUMP > 5 YEARS (2021);  Toe amputation (Left, 7/26/2022); Toe amputation (Right, 10/20/2022); and IR TUNNELED CVC PLACE WO SQ PORT/PUMP > 5 YEARS (11/17/2022). Communication with other providers:  cotx with PT Earl d/t pt tolerance/participation, level of assistance, and for safety. Handoff to nursing staff d/t pt being left on bedpan per pt request    Subjective:  Patient states: \"Does Dr. Zina Hollis know you're here doing this? \"  Pain: denies at rest  Restrictions: general precautions, fall risk, contact precautions    Objective:    Observation:  pt was semi fowlers in bed upon arrival, agreeable to session. Tele, parkinson, wounds B feet. Objective Measures:  stable    Treatment, including education:  Self Care Training (15 minutes):   Self care training was performed today. Cues were given for safety, sequence, UE/LE placement, visual cues, and balance. Activities performed today included     Grooming- min A seated EOB supported, demo oral hygiene (mouthwash), washing face, hand hygiene    Feeding- pt with assist for positioning EOB, encouraged pt to take few bites of breakfast tray. Pt doing so with setup/SBA with support for seated balance. Pt irritated with food quality (pureed diet) and refuses any more. Toileting- dep for pericare in bed, pt with large loose BM but continues to have BM during pericare. Pt requesting therapists leave pt on bedpan and have nursing clean him up later. Bedpan placed under pt    Therapeutic Activity Training (25 minutes): Therapeutic activity training was instructed today. Cues were given for safety, sequence, UE/LE placement, visual cues, and balance. Activities performed today included     Supine to sit- max Ax2    Seated balance- max A, retro/L lean though pt with questionable effort as at times pt intermittent CGA for balance with max cues for participation/tending to posture. Tolerates for ~20 minutes.     Scoot to EOB- max A    STS- x2 partial STS performed using eduin steady max Ax2 for first attempt depx2 second attempt. Pt fatigues with decreasing participation. Pt endorses increasing nausea, states he will vomit if he does any more mobility/transfer training    Sit to supine- max Ax2    Scoot to HOB- max Ax2    Pt states he is going to vomit, pt turned onto L side with bin placed. Pt with no emesis, does cough up phlegm. Rolling x3- max Ax2 for attempted pericare, near vomit, placement of bedpan    Pt positioned for comfort on bedpan, nursing alerted. Education: Role of OT, OT POC, safety, benefits of EOB/OOB activity, rationale for treatment, importance of return to independence, eduin steady use, posture correction  Safety Measures: Gait belt used for safety of pt and therapist, Left in semi fowlers in bed, Alarm in place, call light and phone within reach, lines managed    Assessment / Impression:    Pt with limited participation today and intermittently agitated with edu/cueing for inc participation/independence however thankful for care at end of session. Pt cont to requiring inc x2 assistance for limited mobility. Will cont to follow.     Patient's tolerance of treatment: fair  Adverse Reaction: nausea, fatigue  Significant change in status and impact:  none  Barriers to improvement: participation, deconditioning, comorbidities, tolerance for therapy    Plan for Next Session:    Continue OT POC    Time in:  1015  Time out:  1100  Timed treatment minutes:  40  Total treatment time:  45    Electronically signed by:    Chanell Fernandez OT OTR/L  2/28/2023, 1:16 PM

## 2023-02-28 NOTE — CONSULTS
[facility]  Consultation Note    Radha Brennan M.D.      Reason for Consult: Renal failure need for access for hemodialysis      Patient's Name/Date of Birth: Melissa Carcamo / 1950 (43 y.o.)    Date: February 28, 2023     HPI:  The patient is a 67 y.o. male who presents with weakness shortness of breath and increased lethargy apparently has underlying wounds on lower extremities goes to wound care center was having increased pain was started on Percocet as well as already on Neurontin. Patient became more lethargic difficulty in his breathing family got concerned and brought to the hospital.  Patient is arousable but PCO2 is elevated but started on BiPAP in the emergency room and noted to have hyperkalemia as well with worsening renal function. I discussed with patient's son and daughter as well as patient the daughter wants the patient to be a full code all aggressive measures done and is aware dialysis is a possibility as patient does see my partner for underlying kidney disease. With above patient has evidence of fluid overload increased congestion we will start on the Lasix drip and give Lokelma for hyperkalemia. I discussed with the family that if the potassium does not improve will need dialysis later today. Patient in that setting will be admitted to the ICU and I discussed with the ICU doctor as well. Patient essentially is bedbound at home and is being taken care of by his family. Both lower extremities are wrapped in dressing has had recurrent surgery and debridement done in the past for recurrent infection. Has underlying stage III kidney disease cardiorenal syndrome type I. Underlying type 2 diabetes hypertension coronary artery disease. Last EF about 45%. In the setting of diabetes. And his ICD placement as well.   Also per chart patient was on Bactrim as well which can lead to high potassium as well with worsening renal function  This patient has a prolonged hospital course and has multiple severe medical problems as listed in his past medical history. He has required temporary dialysis and he will need permanent hemodialysis so I been asked to see the patient for access surgery. The patient is awake alert and is aware of the need for access surgery he is right arm dominant    Past Medical History:   Diagnosis Date    Acid reflux     Acute MI ([facility] 2004, 2008    Arthritis     Back    Broken teeth     Upper Front    CAD (coronary artery disease)     Sees Dr. Cristine Putnam [facility])     per old chart    Cerebral artery occlusion with cerebral infarction ([facility]     CHF (congestive heart failure) ([facility]     preserved ejection fraction    Chronic back pain     Chronic kidney disease     Stage IV - patient of Dr Vasquez Rowe    Diabetes mellitus [facility]) Dx 1965    per old chart pt has been diabetic since age 13    Diabetic neuropathy ([facility]     \"in my feet\"    H/O cardiovascular stress test 08/25/2016    H/O Doppler ultrasound 09/27/2018    Moderate disease of the right lower extremity with an JALEN of 0.72. Moderate to severe disease of the left lower extremity with an JALEN of 0.55.     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 11/12/2021       Past Surgical History:   Procedure Laterality Date    CARDIAC CATHETERIZATION      per old chart done 10/2014    CARDIAC CATHETERIZATION  07/14/2017    with angiography of leg    CARDIAC CATHETERIZATION  11/20/2018    PATENT STENTS OF ALL THREE MAJOR VESSELS    CARDIAC DEFIBRILLATOR PLACEMENT  06/04/2010    [employer] Secura DR Defibrillator Implanted    COLECTOMY Right 08/26/2016    laparascopic; robotic assisted    COLONOSCOPY  08/04/2016    CORONARY ANGIOPLASTY      \"15 Heart Stents\"    CORONARY ANGIOPLASTY WITH STENT PLACEMENT      per old chart had angio with stent to circumflex and obtuse marginal artery at [facility] 5/2010( old chart also gives hx of stent placement done 2000,2004 and 2005)    DENTAL SURGERY      Teeth Extracted In Past    IR TUNNELED CATHETER PLACEMENT GREATER THAN 5 YEARS  6/14/2021    IR TUNNELED CATHETER PLACEMENT GREATER THAN 5 YEARS 6/14/2021 [facility]    IR TUNNELED CATHETER PLACEMENT GREATER THAN 5 YEARS  11/17/2022    IR TUNNELED CATHETER PLACEMENT GREATER THAN 5 YEARS 11/17/2022 [facility] PROC[facility]    PACEMAKER PLACEMENT  06/04/2010    [employer]tronic Secura DR Defibrillator Implanted    TOE AMPUTATION Right 09/12/2017    Rt 3rd toe    TOE AMPUTATION Right 01/09/2018     Right 5th toe amputation and Toenails trimmed left 2,3,4 and 5th toes    TOE AMPUTATION Left 12/26/2020    LEFT GREAT TOE AMPUTATION performed by Kaelyn Chun MD at [address] Left 7/26/2022    LEFT SECOND TOE AMPUTATION performed by Tony West MD at [address] Right 10/20/2022    Right First TOE AMPUTATION performed by Tony West MD at [address] [place] [address]      per old chart had balloon angioplasty right superfical femoral artery,right popliteal artery,,right ant.tibial artery, right tibioperoneal trunk, and right post.tibial artery wna stent placement right popliteal artery and superfical femoral artery 7/2012 Allergies   Allergen Reactions    Pcn [Penicillins] Hives    Fentanyl Itching       Family History   Problem Relation Age of Onset    Diabetes Mother     Stroke Mother     High Blood Pressure Mother     Vision Loss Mother     Cancer Father         Prostate Cancer    Diabetes Sister     Neuropathy Sister     Other Sister         \"Breathing Problems\"    Heart Disease Sister     Early Death Sister 62        Heart Complications    Cancer Brother         \"Stomach Cancer\"    High Blood Pressure Brother     Diabetes Brother     Heart Disease Brother     High Blood Pressure Brother     Cancer Son         \"Testicle Cancer\"       Social History     Socioeconomic History    Marital status:      Spouse name: Not on file    Number of children: Not on file    Years of education: Not on file    Highest education level: Not on file   Occupational History    Not on file   Tobacco Use    Smoking status: Some Days     Types: Cigars    Smokeless tobacco: Never    Tobacco comments:     Client states he has stopped smoking   Vaping Use    Vaping Use: Never used   Substance and Sexual Activity    Alcohol use: No    Drug use: Yes     Types: Marijuana Rae Martin)    Sexual activity: Never   Other Topics Concern    Not on file   Social History Narrative    Not on file     Social Determinants of Health     Financial Resource Strain: Not on file   Food Insecurity: Not on file   Transportation Needs: Not on file   Physical Activity: Not on file   Stress: Not on file   Social Connections: Not on file   Intimate Partner Violence: Not on file   Housing Stability: Not on file       ROS: Non-contributory    Physical Exam:  Vitals:    02/28/23 0946   BP:    Pulse:    Resp:    Temp:    SpO2: 95%       Chest: Breath sounds were clear and equal with no rales, wheezes, or rhonchi. Respiratory effort was normal with no retractions or use of accessory muscles.   Temporary Vas-Cath catheter    Cardiovascular: Heart sounds were normal with a regular rate and rhythm. There were no murmurs or gallops. Abdomen: Bowel sounds were normal.  The abdomen was soft and non distended. There was no tenderness, guarding, rebound, or rigidity. There was no masses, hepatosplenomegaly, or hernias. Genitourinary: No inguinal hernias were noted on coughing and straining. Patient has a defibrillator in left subclavian area and has swelling of the left arm which the patient claims is chronic. This may represent a venous outflow problem or subclavian vein stenosis. He does have palpable pulses. Examination the right upper extremity reveals palpable pulses. There appears to be some firmness of the cephalic vein in the upper arm consistent with a possible thrombosis. Patient  has had multiple toe amputations. Labs    CBC:   Lab Results   Component Value Date/Time    WBC 6.9 02/28/2023 07:23 AM    RBC 3.07 02/28/2023 07:23 AM    HGB 8.0 02/28/2023 07:23 AM    HCT 28.1 02/28/2023 07:23 AM    MCV 91.5 02/28/2023 07:23 AM    MCH 26.1 02/28/2023 07:23 AM    MCHC 28.5 02/28/2023 07:23 AM    RDW 19.2 02/28/2023 07:23 AM     02/28/2023 07:23 AM    MPV 9.2 02/28/2023 07:23 AM     BMP:    Lab Results   Component Value Date/Time     02/28/2023 07:23 AM    K 3.9 02/28/2023 07:23 AM    K 5.1 01/10/2018 04:32 AM     02/28/2023 07:23 AM    CO2 35 02/28/2023 07:23 AM    BUN 61 02/28/2023 07:23 AM    LABALBU 2.7 02/25/2023 06:30 AM    CREATININE 3.9 02/28/2023 07:23 AM    CALCIUM 8.2 02/28/2023 07:23 AM    GFRAA 25 10/17/2022 06:50 AM    LABGLOM 16 02/28/2023 07:23 AM    GLUCOSE 100 02/28/2023 07:23 AM         Assessment/Plan:  80-year-old male who has multiple severe medical problems including congestive heart failure, atherosclerotic heart disease chronic kidney failure peripheral vascular disease who will require hemodialysis in the future.   Given the swelling in the left arm and the presence of a defibrillator there is a higher probability of a venous outflow partial obstruction of that extremity so I would prefer the right upper extremity. If he does have cephalic vein thrombosis then a brachial artery to axillary vein graft will be necessary. I did  the patient about the plan for access surgery if he is medically cleared for surgery and most likely will attempt to do his surgery during his hospitalization.   I will review the results of vein mapping and make further recommendations at that time    Aurea Alonzo MD   2/28/2023 at 10:24 AM

## 2023-02-28 NOTE — PROGRESS NOTES
Physical Therapy    Physical Therapy Treatment Note  Name: Jose Mack MRN: [id number] :   1950   Date:  2023   Admission Date: 2023 Room:  -A   Restrictions/Precautions:  fall risk ; general precautions; contact  Communication with other providers:  RN clears for participation ; RN handoff  Subjective:  Patient states: \"Does Dr Elizabeth Coats know youre here? \"  Pain:   Location, Type, Intensity (0/10 to 10/10):  denies pain at rest  Objective:    Observation:  Supine, lethargic, agreeable. He is willing to limited participation, questionable effort noted t/o. Tele, BP cuff, parkinson, nc O2 in place. Treatment, including education/measures:  Scooting for positioning of transfer: max A  Supine <-> sit: max A x 2  Seated balance: SBA to max A ; impacted by pt cognition and endurance ; seated EOB ~ 25 mins for participation in ADL's, balance activities, LE strength  Sit <-> supine: max A x w  Scooting to HOB: max A x 2  Positioning for bed-pan: max A x 2 - pt requesting inc time to attempt to complete toileting ; nsg Lindo Marydel alerted and agreeable to assist pt off BSC in 5-10 mins    Neuro-Muscular re-education:  Cues were given for position, posture, kinesthetic sense, safety, recruitment, and rationale. Cues were verbal and/or tactile. Intermittent/cont cues to attend to posture - min to max A to attend to posture and L lean during completion of ADL's and dynamic seated balance tasks  Poor ability to engage UE in balance, cognition and lethargy impacting and requiring inc assist this session  Assisted w/ balance during initiation of feeding and requires max cues to attend to task and take a few bites - inc time and effort, not agreeable to attempt more    Therapeutic Exercise:  Cues were given for technique, safety, recruitment, and rationale. Cues were verbal and/or tactile.   Seated LAQ in prep for transfer training w/ preparation for OOB  Extensive education provided regarding use of eduin-steady, attention to quality and effort, use of OOB to attend assist in return to transfer participation    Assessment / Impression:    Pt participating in limited amount of activity, he agreeable to limited amounts w/ max encouragement. Cognition and volitional effort intermittently impacting participation ; endurance, strength, balance, functional mobility all remain impaired. Will cont to benefit from subacute therapy placement prior to return home.      Patient's tolerance of treatment:  fair   Barriers to improvement:  self-limiting ; decondition; cognition; activity tolerance  Plan for Next Session:    Cont w/ est DC plan (SNF)  Progress balance, functional mobility, neuro re-ed, LE strength, transfer training w/ eduin-steady and assist x 2    Time in:  1015  Time out:  1100  Timed treatment minutes:  39  Total treatment time:  39    Previously filed items:  Social/Functional History  Lives With: Family, Daughter  Type of Home: House  Home Layout: Two level  Home Access: Ramped entrance  Bathroom Shower/Tub: [address] chair without back, Tub/Shower unit  Bathroom Equipment: Shower chair  Bathroom Accessibility: Not accessible  Home Equipment: Kandy Felix, Electric scooter, Walker, standard, Hospital bed  [employer] Help From: Family  ADL Assistance: Needs assistance  Bath: Independent  Dressing: Independent  Grooming: Independent  Feeding: Independent  Toileting: Independent  Homemaking Assistance: Needs assistance  Ambulation Assistance: Needs assistance  Transfer Assistance: Needs assistance  Active : No  Mode of Transportation: Car  Occupation: Retired, On disability  Patient Goals   Patient Goals : return to [address] [place] [address]  Time Frame for Short Term Goals: 1 week  Short Term Goal 1: pt will complete bed mobility at mod A  Short Term Goal 2: pt will demonstrate fair - seated balance w/ min posterior support during participation of EOB  Short Term Goal 3: pt will demonstrate 3+/5 BLE strength  Short Term Goal 4: pt will demonstrate 20 mins of seated tolerance for performance at [facility]    Electronically signed by:    Floria Meigs, PT, DPT  2/28/2023, 12:59 PM

## 2023-02-28 NOTE — PROGRESS NOTES
Comprehensive Nutrition Assessment    Type and Reason for Visit:  Reassess    Nutrition Recommendations/Plan:   Continue diet as per SLP, encourage intake     Malnutrition Assessment:  Malnutrition Status:  No malnutrition (02/13/23 1225)    Context:  Acute Illness       Nutrition Assessment:    SLP continues to work with Pt, today recommends initiate [employer] protocol for thin water, continue moist and mince diet and continue nectar thick liquids, continue feed assist, pills crushed in puree as able. Continues with poor po intake, does not like the food here, total assist with feeding consuming only 1-25% of meals per flowsheets. Pt working with Therapy during room visit. Severe wt loss noted over stay is likely partially fluid related. Will continue current oral supplement (thickened) and follow as high nutrition risk. Nutrition Related Findings:    BUN 61, Cr 3.9. GFR 16, Glu 100, A1c 7.1   Wound Type: Multiple, Venous Stasis, Diabetic Ulcer       Current Nutrition Intake & Therapies:    Average Meal Intake: 1-25%  Average Supplements Intake: Unable to assess  ADULT ORAL NUTRITION SUPPLEMENT; Lunch, Dinner; Diabetic Oral Supplement  ADULT DIET; Dysphagia - Minced and Moist; Mildly Thick (Nectar); No Drinking Straws    Anthropometric Measures:  Height: 6' (182.9 cm)  Ideal Body Weight (IBW): 178 lbs (81 kg)    Admission Body Weight: 299 lb 4.8 oz (135.8 kg)  Current Body Weight: 235 lb 10.8 oz (106.9 kg), 158.3 % IBW. Weight Source: Bed Scale  Current BMI (kg/m2): 32  Usual Body Weight: 261 lb 7 oz (118.6 kg) (11/13/22)  % Weight Change (Calculated): 14.2  Weight Adjustment For: Amputation, No Adjustment (toe amputation)                 BMI Categories: Obese Class 1 (BMI 30.0-34. 9)    Estimated Daily Nutrient Needs:  Energy Requirements Based On: Formula  Weight Used for Energy Requirements: Current  Energy (kcal/day): 2466  Weight Used for Protein Requirements: Ideal  Protein (g/day): 97  Method Used for Fluid Requirements: Standard Renal  Fluid (ml/day): 1200    Nutrition Diagnosis:   Inadequate protein-energy intake related to impaired nutrient utilization, increase demand for energy/nutrients as evidenced by intake 0-25%, wounds, weight loss    Nutrition Interventions:   Food and/or Nutrient Delivery: Continue Current Diet, Continue Oral Nutrition Supplement  Nutrition Education/Counseling: No recommendation at this time  Coordination of Nutrition Care: Continue to monitor while inpatient, Feeding Assistance/Environment Change, Speech Therapy, Swallow Evaluation       Goals:  Previous Goal Met: Progress towards Goal(s) Declining  Goals: Meet at least 75% of estimated needs       Nutrition Monitoring and Evaluation:   Behavioral-Environmental Outcomes: None Identified  Food/Nutrient Intake Outcomes: Food and Nutrient Intake, Supplement Intake  Physical Signs/Symptoms Outcomes: Biochemical Data, Chewing or Swallowing, GI Status, Fluid Status or Edema, Meal Time Behavior, Skin, Weight    Discharge Planning:    Continue Oral Nutrition Supplement     Ronaldo Shane, [address] [place] [address],   Contact: [phone]

## 2023-02-28 NOTE — PROGRESS NOTES
Nephrology Progress Note  2/28/2023 7:02 AM        Subjective:   Admit Date: 2/11/2023  PCP: Teodoro Veronica History: No major event, slowly improving,    Diet: Reasonable    ROS: No shortness of breath or confusion, he does not want to go to nursing home  His right IJ temporary dialysis catheter has been removed yesterday  Urine output about 1.87 L for the last 24 hours  No fever and variable blood pressure recorded    Data:     Current meds:    torsemide  40 mg Oral Daily    levoFLOXacin  750 mg Oral Every Other Day    metroNIDAZOLE  500 mg Oral 3 times per day    linezolid  600 mg Oral 2 times per day    silver sulfADIAZINE   Topical Daily    famotidine  20 mg Per NG tube Daily    heparin (porcine)  5,000 Units SubCUTAneous 3 times per day    sodium hypochlorite   Irrigation Daily    collagenase   Topical Daily    sodium chloride flush  5-40 mL IntraVENous 2 times per day    aspirin  81 mg Oral Daily    [Held by provider] clopidogrel  75 mg Oral Daily      dextrose      sodium chloride 10 mL/hr at 02/21/23 0644         I/O last 3 completed shifts:   In: 20 [P.O.:20]  Out: 4475 [Urine:4475]    CBC:   Recent Labs     02/26/23  0800 02/27/23  0430   WBC 7.2 7.0   HGB 8.2* 8.0*    211          Recent Labs     02/26/23  0800 02/27/23  0430    145   K 4.1 4.0    101   CO2 32 34*   BUN 67* 65*   CREATININE 3.9* 4.1*   GLUCOSE 87 109*       Lab Results   Component Value Date    CALCIUM 8.3 02/27/2023    PHOS 4.1 02/24/2023       Objective:     Vitals: BP (!) 174/72   Pulse 71   Temp 97.9 °F (36.6 °C) (Axillary)   Resp 16   Ht 6' (1.829 m)   Wt 235 lb 10.8 oz (106.9 kg)   SpO2 95%   BMI 31.96 kg/m² ,    General appearance: Alert, awake and oriented  HEENT: 1+ conjunctival pallor no scleral icterus  Neck: Supple  Lungs: No gross crackles, few rhonchi  Heart: Regular rate and rhythm, left chest wall implanted cardiac device  Abdomen: Soft, nontender  Extremities: No gross leg edema, rib cage of Skeens, chronic leg wound      Problem List :         Impression :     Stage III acute kidney injury with underlying CKD stage IV A3-acceptable urine output-creatinine expected to plateau-Labs are pending this morning  Recent acute decompensated heart failure fluid overload doing much better  Also recent septic shock with underlying atherosclerotic cardiovascular disease, diabetes, urethral stricture needing Fuller etc.  Severe aftermath of recent acute illness and prolonged hospitalization    Recommendation/Plan  :     Upper extremity vessel mapping were ordered-for potential AV graft or fistula placement-I will have one of the surgeon look at him-and see if we can create a fistula graft as soon as possible-the goal is to medically manage him and prepare him for dialysis in the future-so I do not have to use catheter-we will have to adjust diuretics for rest of his life-he is still on multiple antimicrobial agent for presumed skin and soft tissue infection-he might be a candidate for sodium glucose cotransporter 2 inhibitors-but I am little concerned about his indwelling Fuller-I will hold for now-also need to communicate with urology service-what to do with his Fuller catheter-we should start thinking about his disposition-follow clinically and biochemically      Danita Almonte MD MD

## 2023-03-01 LAB
ANION GAP SERPL CALCULATED.3IONS-SCNC: 10 MMOL/L (ref 4–16)
BASOPHILS ABSOLUTE: 0.1 K/CU MM
BASOPHILS RELATIVE PERCENT: 0.7 % (ref 0–1)
BUN SERPL-MCNC: 55 MG/DL (ref 6–23)
CALCIUM SERPL-MCNC: 8.5 MG/DL (ref 8.3–10.6)
CHLORIDE BLD-SCNC: 100 MMOL/L (ref 99–110)
CO2: 34 MMOL/L (ref 21–32)
CREAT SERPL-MCNC: 3.8 MG/DL (ref 0.9–1.3)
DIFFERENTIAL TYPE: ABNORMAL
EOSINOPHILS ABSOLUTE: 0.2 K/CU MM
EOSINOPHILS RELATIVE PERCENT: 3 % (ref 0–3)
GFR SERPL CREATININE-BSD FRML MDRD: 16 ML/MIN/1.73M2
GLUCOSE BLD-MCNC: 101 MG/DL (ref 70–99)
GLUCOSE BLD-MCNC: 92 MG/DL (ref 70–99)
GLUCOSE SERPL-MCNC: 101 MG/DL (ref 70–99)
HCT VFR BLD CALC: 30.5 % (ref 42–52)
HEMOGLOBIN: 8.6 GM/DL (ref 13.5–18)
IMMATURE NEUTROPHIL %: 0.4 % (ref 0–0.43)
LYMPHOCYTES ABSOLUTE: 0.8 K/CU MM
LYMPHOCYTES RELATIVE PERCENT: 10.4 % (ref 24–44)
MCH RBC QN AUTO: 25.8 PG (ref 27–31)
MCHC RBC AUTO-ENTMCNC: 28.2 % (ref 32–36)
MCV RBC AUTO: 91.6 FL (ref 78–100)
MONOCYTES ABSOLUTE: 0.3 K/CU MM
MONOCYTES RELATIVE PERCENT: 4.2 % (ref 0–4)
NUCLEATED RBC %: 0 %
PDW BLD-RTO: 19.3 % (ref 11.7–14.9)
PLATELET # BLD: 204 K/CU MM (ref 140–440)
PMV BLD AUTO: 9.3 FL (ref 7.5–11.1)
POTASSIUM SERPL-SCNC: 3.9 MMOL/L (ref 3.5–5.1)
RBC # BLD: 3.33 M/CU MM (ref 4.6–6.2)
SEGMENTED NEUTROPHILS ABSOLUTE COUNT: 6.2 K/CU MM
SEGMENTED NEUTROPHILS RELATIVE PERCENT: 81.3 % (ref 36–66)
SODIUM BLD-SCNC: 144 MMOL/L (ref 135–145)
TOTAL IMMATURE NEUTOROPHIL: 0.03 K/CU MM
TOTAL NUCLEATED RBC: 0 K/CU MM
WBC # BLD: 7.7 K/CU MM (ref 4–10.5)

## 2023-03-01 PROCEDURE — 6370000000 HC RX 637 (ALT 250 FOR IP): Performed by: STUDENT IN AN ORGANIZED HEALTH CARE EDUCATION/TRAINING PROGRAM

## 2023-03-01 PROCEDURE — 2060000000 HC ICU INTERMEDIATE R&B

## 2023-03-01 PROCEDURE — 2700000000 HC OXYGEN THERAPY PER DAY

## 2023-03-01 PROCEDURE — 6370000000 HC RX 637 (ALT 250 FOR IP): Performed by: NURSE PRACTITIONER

## 2023-03-01 PROCEDURE — 6360000002 HC RX W HCPCS: Performed by: STUDENT IN AN ORGANIZED HEALTH CARE EDUCATION/TRAINING PROGRAM

## 2023-03-01 PROCEDURE — 36415 COLL VENOUS BLD VENIPUNCTURE: CPT

## 2023-03-01 PROCEDURE — 82962 GLUCOSE BLOOD TEST: CPT

## 2023-03-01 PROCEDURE — 6360000002 HC RX W HCPCS: Performed by: NURSE PRACTITIONER

## 2023-03-01 PROCEDURE — 85025 COMPLETE CBC W/AUTO DIFF WBC: CPT

## 2023-03-01 PROCEDURE — 80048 BASIC METABOLIC PNL TOTAL CA: CPT

## 2023-03-01 PROCEDURE — 94761 N-INVAS EAR/PLS OXIMETRY MLT: CPT

## 2023-03-01 PROCEDURE — 2580000003 HC RX 258: Performed by: NURSE PRACTITIONER

## 2023-03-01 PROCEDURE — 6370000000 HC RX 637 (ALT 250 FOR IP): Performed by: INTERNAL MEDICINE

## 2023-03-01 PROCEDURE — 2500000003 HC RX 250 WO HCPCS: Performed by: PHYSICIAN ASSISTANT

## 2023-03-01 PROCEDURE — 92526 ORAL FUNCTION THERAPY: CPT

## 2023-03-01 RX ADMIN — SODIUM HYPOCHLORITE: 1.25 SOLUTION TOPICAL at 09:05

## 2023-03-01 RX ADMIN — SODIUM CHLORIDE, PRESERVATIVE FREE 10 ML: 5 INJECTION INTRAVENOUS at 09:07

## 2023-03-01 RX ADMIN — HYDROMORPHONE HYDROCHLORIDE 0.25 MG: 1 INJECTION, SOLUTION INTRAMUSCULAR; INTRAVENOUS; SUBCUTANEOUS at 09:13

## 2023-03-01 RX ADMIN — HEPARIN SODIUM 5000 UNITS: 5000 INJECTION INTRAVENOUS; SUBCUTANEOUS at 15:16

## 2023-03-01 RX ADMIN — ASPIRIN 81 MG CHEWABLE TABLET 81 MG: 81 TABLET CHEWABLE at 09:04

## 2023-03-01 RX ADMIN — COLLAGENASE SANTYL: 250 OINTMENT TOPICAL at 09:06

## 2023-03-01 RX ADMIN — METRONIDAZOLE 500 MG: 250 TABLET ORAL at 21:27

## 2023-03-01 RX ADMIN — METRONIDAZOLE 500 MG: 250 TABLET ORAL at 06:09

## 2023-03-01 RX ADMIN — TORSEMIDE 40 MG: 20 TABLET ORAL at 09:04

## 2023-03-01 RX ADMIN — FAMOTIDINE 20 MG: 20 TABLET ORAL at 09:04

## 2023-03-01 RX ADMIN — METRONIDAZOLE 500 MG: 250 TABLET ORAL at 15:15

## 2023-03-01 RX ADMIN — HYDROMORPHONE HYDROCHLORIDE 0.25 MG: 1 INJECTION, SOLUTION INTRAMUSCULAR; INTRAVENOUS; SUBCUTANEOUS at 17:03

## 2023-03-01 RX ADMIN — HEPARIN SODIUM 5000 UNITS: 5000 INJECTION INTRAVENOUS; SUBCUTANEOUS at 06:17

## 2023-03-01 RX ADMIN — SODIUM CHLORIDE, PRESERVATIVE FREE 10 ML: 5 INJECTION INTRAVENOUS at 21:28

## 2023-03-01 RX ADMIN — HYDROMORPHONE HYDROCHLORIDE 0.25 MG: 1 INJECTION, SOLUTION INTRAMUSCULAR; INTRAVENOUS; SUBCUTANEOUS at 21:43

## 2023-03-01 RX ADMIN — SILVER SULFADIAZINE: 10 CREAM TOPICAL at 09:05

## 2023-03-01 RX ADMIN — HEPARIN SODIUM 5000 UNITS: 5000 INJECTION INTRAVENOUS; SUBCUTANEOUS at 21:27

## 2023-03-01 ASSESSMENT — PAIN SCALES - GENERAL
PAINLEVEL_OUTOF10: 8
PAINLEVEL_OUTOF10: 4
PAINLEVEL_OUTOF10: 8
PAINLEVEL_OUTOF10: 4
PAINLEVEL_OUTOF10: 9
PAINLEVEL_OUTOF10: 4

## 2023-03-01 ASSESSMENT — ENCOUNTER SYMPTOMS
SORE THROAT: 0
EYE REDNESS: 0
NAUSEA: 0
CONSTIPATION: 0
SINUS PRESSURE: 0
CHEST TIGHTNESS: 0
VOMITING: 0
EYE PAIN: 0
DIARRHEA: 0
WHEEZING: 0
ABDOMINAL PAIN: 0
SINUS PAIN: 0
SHORTNESS OF BREATH: 1
BACK PAIN: 0
EYE ITCHING: 0
COUGH: 0

## 2023-03-01 ASSESSMENT — PAIN DESCRIPTION - LOCATION
LOCATION: FOOT
LOCATION: BACK
LOCATION: FOOT
LOCATION: BACK

## 2023-03-01 ASSESSMENT — PAIN - FUNCTIONAL ASSESSMENT
PAIN_FUNCTIONAL_ASSESSMENT: PREVENTS OR INTERFERES SOME ACTIVE ACTIVITIES AND ADLS
PAIN_FUNCTIONAL_ASSESSMENT: PREVENTS OR INTERFERES WITH MANY ACTIVE NOT PASSIVE ACTIVITIES

## 2023-03-01 ASSESSMENT — PAIN DESCRIPTION - FREQUENCY: FREQUENCY: INTERMITTENT

## 2023-03-01 ASSESSMENT — PAIN DESCRIPTION - DESCRIPTORS
DESCRIPTORS: ACHING;DISCOMFORT;SORE
DESCRIPTORS: ACHING
DESCRIPTORS: ACHING;SORE;TENDER

## 2023-03-01 ASSESSMENT — PAIN DESCRIPTION - PAIN TYPE
TYPE: CHRONIC PAIN
TYPE: CHRONIC PAIN

## 2023-03-01 ASSESSMENT — PAIN DESCRIPTION - ONSET: ONSET: ON-GOING

## 2023-03-01 ASSESSMENT — PAIN DESCRIPTION - ORIENTATION
ORIENTATION: RIGHT;LEFT
ORIENTATION: LOWER;MID

## 2023-03-01 NOTE — PROGRESS NOTES
V2.0  [facility] Hospitalist Progress Note      Name:  Kenan Ferguson /Age/Sex: 1950  (67 y.o. male)   MRN & CSN:  [id number] & [id number] Encounter Date/Time: 3/1/2023 6:51 PM EST    Location:  [facility] PCP: Edmundo Lee Day: 19    Assessment and Plan:   Kenan Ferguson is a 67 y.o. male with pmh of  CAD, HFpEF, ICD, CKD, DM who presents with Acute respiratory failure with hypoxia and hypercapnia ([facility] [address]    Plan:    Acute hypoxic Hypercapneic respiratory failure with Pneumonia: was initially placed on BIPAP but later was intubated on 2023   Patient had difficulty ventilating on 2023. Had a brief cardiopulmonary arrest as well. ET tube was replaced  Extubated on 2023    Large pleural effusion  Chest tube inserted on 2023, removed on 2023. Cardiopulmonary arrest due to difficulty in ventilation    Acute renal failure with h/o CKD stage IV  On HD initially for few days. Off since 2/15/2023. Baseline creatinine appears to be around 2.2 to 2.3 mg/DL. Creatinine stabilizing. Being seen by surgery for access surgery for future dialysis need    Right UE DVT  Seen on vein mapping done 2023  We will consult hematology/oncology for anticoagulation    Chronic anemia  Current hemoglobin appears around baseline of 8-9 mG/DL  Likely due to anemia of chronic kidney disease  Heme-onc consulted    Hyperkalemia-resolve    Elevated troponin: likely type 2 MI in the setting of respiratory as well as kidney failure, does have h/o CAD with multiple stents, Cardio on board. HFpEF:  EF 50-55% in . Repeat echo with simillar EF, hypokinetic RV with bowing of Interventricular septum towards LV. Chronic LE wounds: Wound care on board. Likely infected, Purulent drainage from RLE 1st metatarsal which is foul smelling. General surgery on consult, appreciate recs.  Wound cultures sent - growing Pseudomonas aeruginosa, Citrobacter freundii MRSA, Enterococcus faecalis, Bacteroides thetaiotaomicron. On linezolid, levofloxacin, and metronidazole. Infectious disease has been consulted and will defer antibiotic choices to them. Class II Obesity. BMI 35.82    Type II DM. Monitor off of insulin. Could benefit from orals at NE, but be wary of anything causes hypoglycema    History of urethral stricture with urinary retention. S/p Fuller catheter insertion. Remove Fuller catheter today. Voiding trial.      Diet ADULT ORAL NUTRITION SUPPLEMENT; Lunch, Dinner; Diabetic Oral Supplement  ADULT DIET; Dysphagia - Minced and Moist; Mildly Thick (Nectar); No Drinking Straws  Diet NPO   DVT Prophylaxis [] Lovenox, [x]  Heparin, [] SCDs, [] Ambulation,  [] Eliquis, [] Xarelto  [] Coumadin   Code Status Full Code   Disposition From: Home  Expected Disposition: TBD  Estimated Date of Discharge: 2-4 days  Patient requires continued admission due to Respiratory and renal failure   Surrogate Decision Maker/ POA      Subjective:     Chief Complaint: Respiratory Distress     Seen and examined in the morning. No new complaints. Agreeable with Fuller removal and voiding trial today. Fredo Sullivan to go home. Review of Systems:    Review of Systems   Constitutional:  Negative for appetite change, chills, fatigue, fever and unexpected weight change. HENT:  Negative for sinus pressure, sinus pain and sore throat. Eyes:  Negative for pain, redness and itching. Respiratory:  Positive for shortness of breath. Negative for cough, chest tightness and wheezing. Cardiovascular:  Negative for chest pain, palpitations and leg swelling. Gastrointestinal:  Negative for abdominal pain, constipation, diarrhea, nausea and vomiting. Endocrine: Negative for polydipsia, polyphagia and polyuria. Genitourinary:  Negative for decreased urine volume, difficulty urinating, dysuria, frequency, hematuria and urgency. Musculoskeletal:  Positive for myalgias. Negative for arthralgias and back pain.    Skin: Positive for wound. Negative for pallor and rash. Neurological:  Negative for dizziness, tremors, seizures, syncope, weakness, light-headedness, numbness and headaches. Psychiatric/Behavioral:  Negative for agitation and confusion. Objective: Intake/Output Summary (Last 24 hours) at 3/1/2023 1144  Last data filed at 2/28/2023 2115  Gross per 24 hour   Intake --   Output 1700 ml   Net -1700 ml          Vitals:   Vitals:    03/01/23 0245   BP: (!) 176/69   Pulse: 72   Resp: 18   Temp:    SpO2: 96%       Physical Exam:   Physical Exam  Vitals and nursing note reviewed. Constitutional:       General: He is not in acute distress. Appearance: He is obese. He is not ill-appearing, toxic-appearing or diaphoretic. Interventions: Nasal cannula in place. Comments: 4L/min   HENT:      Head: Normocephalic and atraumatic. Right Ear: External ear normal.      Left Ear: External ear normal.      Nose: Nose normal.      Mouth/Throat:      Mouth: Mucous membranes are moist.      Pharynx: Oropharynx is clear. Eyes:      General: No scleral icterus. Right eye: No discharge. Left eye: No discharge. Conjunctiva/sclera: Conjunctivae normal.      Pupils: Pupils are equal, round, and reactive to light. Cardiovascular:      Rate and Rhythm: Normal rate and regular rhythm. Pulses: Normal pulses. Heart sounds: Normal heart sounds. No murmur heard. No friction rub. No gallop. Pulmonary:      Effort: Pulmonary effort is normal. No respiratory distress. Breath sounds: Normal breath sounds. Decreased air movement and transmitted upper airway sounds (Improved) present. No stridor. No decreased breath sounds, wheezing, rhonchi or rales. Abdominal:      General: Bowel sounds are normal. There is no distension. Palpations: Abdomen is soft. There is no mass. Tenderness: There is no abdominal tenderness. There is no guarding or rebound.       Hernia: No hernia is present. Musculoskeletal:      Right lower leg: No edema. Left lower leg: No edema. Comments: B/L LE bandaged   Skin:     Capillary Refill: Capillary refill takes less than 2 seconds. Neurological:      Mental Status: He is alert and oriented to person, place, and time.          Medications:   Medications:    torsemide  40 mg Oral Daily    metroNIDAZOLE  500 mg Oral 3 times per day    silver sulfADIAZINE   Topical Daily    famotidine  20 mg Per NG tube Daily    heparin (porcine)  5,000 Units SubCUTAneous 3 times per day    sodium hypochlorite   Irrigation Daily    collagenase   Topical Daily    sodium chloride flush  5-40 mL IntraVENous 2 times per day    aspirin  81 mg Oral Daily    [Held by provider] clopidogrel  75 mg Oral Daily      Infusions:    dextrose      sodium chloride 10 mL/hr at 02/21/23 0644     PRN Meds: ondansetron, 4 mg, Q6H PRN  ipratropium-albuterol, 1 ampule, Q4H PRN  HYDROmorphone, 0.25 mg, Q4H PRN  albuterol, 2.5 mg, Q2H PRN  heparin flush, 240 Units, PRN  glucose, 4 tablet, PRN  dextrose bolus, 125 mL, PRN   Or  dextrose bolus, 250 mL, PRN  glucagon (rDNA), 1 mg, PRN  dextrose, , Continuous PRN  sodium chloride flush, 10 mL, PRN  sodium chloride, , PRN      Labs      Recent Results (from the past 24 hour(s))   CBC with Auto Differential    Collection Time: 03/01/23  8:26 AM   Result Value Ref Range    WBC 7.7 4.0 - 10.5 K/CU MM    RBC 3.33 (L) 4.6 - 6.2 M/CU MM    Hemoglobin 8.6 (L) 13.5 - 18.0 GM/DL    Hematocrit 30.5 (L) 42 - 52 %    MCV 91.6 78 - 100 FL    MCH 25.8 (L) 27 - 31 PG    MCHC 28.2 (L) 32.0 - 36.0 %    RDW 19.3 (H) 11.7 - 14.9 %    Platelets 368 295 - 050 K/CU MM    MPV 9.3 7.5 - 11.1 FL    Differential Type AUTOMATED DIFFERENTIAL     Segs Relative 81.3 (H) 36 - 66 %    Lymphocytes % 10.4 (L) 24 - 44 %    Monocytes % 4.2 (H) 0 - 4 %    Eosinophils % 3.0 0 - 3 %    Basophils % 0.7 0 - 1 %    Segs Absolute 6.2 K/CU MM    Lymphocytes Absolute 0.8 K/CU MM    Monocytes Absolute 0.3 K/CU MM    Eosinophils Absolute 0.2 K/CU MM    Basophils Absolute 0.1 K/CU MM    Nucleated RBC % 0.0 %    Total Nucleated RBC 0.0 K/CU MM    Total Immature Neutrophil 0.03 K/CU MM    Immature Neutrophil % 0.4 0 - 0.43 %   Basic Metabolic Panel w/ Reflex to MG    Collection Time: 03/01/23  8:26 AM   Result Value Ref Range    Sodium 144 135 - 145 MMOL/L    Potassium 3.9 3.5 - 5.1 MMOL/L    Chloride 100 99 - 110 mMol/L    CO2 34 (H) 21 - 32 MMOL/L    Anion Gap 10 4 - 16    BUN 55 (H) 6 - 23 MG/DL    Creatinine 3.8 (H) 0.9 - 1.3 MG/DL    Est, Glom Filt Rate 16 (L) >60 mL/min/1.73m2    Glucose 101 (H) 70 - 99 MG/DL    Calcium 8.5 8.3 - 10.6 MG/DL   POCT Glucose    Collection Time: 03/01/23  9:20 AM   Result Value Ref Range    POC Glucose 101 (H) 70 - 99 MG/DL        Imaging/Diagnostics Last 24 Hours   XR CHEST PORTABLE    Result Date: 2/11/2023  EXAMINATION: ONE XRAY VIEW OF THE CHEST 2/11/2023 7:29 pm COMPARISON: Chest radiograph 02/11/2023 HISTORY: ORDERING SYSTEM PROVIDED HISTORY: ETT placement TECHNOLOGIST PROVIDED HISTORY: Reason for exam:->ETT placement Reason for Exam: et and og tube placement confirmation Additional signs and symptoms: et and og tube placement confrimation FINDINGS: Lines and tubes: -ETT terminates at the superior margin of the clavicles. -enteric tube takes a the subdiaphragmatic course and terminates out of the field of view -right-sided Cordis catheter, which terminates within the mid/upper SVC Lungs: There is indistinctness of the pulmonary vasculature with patchy opacities within the right greater than left lungs. . Pleura: Small right-sided pleural effusion. Cardiomediastinal silhouette: Enlarged cardiac silhouette. Bones: No acute osseous findings. Soft tissues: Left-sided 2 lead cardiac device. Lines and tubes as above. The ETT terminates at the level of the superior margin of the clavicles. Grossly unchanged pulmonary exam.  Findings favor cardiogenic pulmonary edema. However a superimposed infectious process cannot be excluded.        Electronically signed by Liz Perez MD on 3/1/2023 at 11:44 AM

## 2023-03-01 NOTE — PROGRESS NOTES
Nephrology Progress Note  3/1/2023 7:48 AM        Subjective:   Admit Date: 2/11/2023  PCP: Willow Grullon    Interval History: Slowly improving, he wants to go home    Diet: Eating better    ROS: No shortness of breath  Urine output still 1.7 L for the last 24 hours  No fever variable blood pressure recorded    Data:     Current meds:    torsemide  40 mg Oral Daily    metroNIDAZOLE  500 mg Oral 3 times per day    linezolid  600 mg Oral 2 times per day    silver sulfADIAZINE   Topical Daily    famotidine  20 mg Per NG tube Daily    heparin (porcine)  5,000 Units SubCUTAneous 3 times per day    sodium hypochlorite   Irrigation Daily    collagenase   Topical Daily    sodium chloride flush  5-40 mL IntraVENous 2 times per day    aspirin  81 mg Oral Daily    [Held by provider] clopidogrel  75 mg Oral Daily      dextrose      sodium chloride 10 mL/hr at 02/21/23 0644         I/O last 3 completed shifts:  In: -   Out: 2300 [Urine:2300]    CBC:   Recent Labs     02/26/23  0800 02/27/23  0430 02/28/23  0723   WBC 7.2 7.0 6.9   HGB 8.2* 8.0* 8.0*    211 200          Recent Labs     02/26/23  0800 02/27/23  0430 02/28/23  0723    145 144   K 4.1 4.0 3.9    101 101   CO2 32 34* 35*   BUN 67* 65* 61*   CREATININE 3.9* 4.1* 3.9*   GLUCOSE 87 109* 100*       Lab Results   Component Value Date    CALCIUM 8.2 (L) 02/28/2023    PHOS 4.1 02/24/2023       Objective:     Vitals: BP (!) 176/69   Pulse 72   Temp 98 °F (36.7 °C) (Axillary)   Resp 18   Ht 6' (1.829 m)   Wt 235 lb 10.8 oz (106.9 kg)   SpO2 96%   BMI 31.96 kg/m² ,    General appearance: Alert, awake and oriented he lost muscle mass  HEENT: Positive conjunctival pallor no scleral icterus  Neck: Supple  Lungs: No gross crackles this morning  Heart: Regular rate and rhythm, left chest wall implanted cardiac device  Abdomen: Soft, nontender  Extremities: No gross edema, stasis dermatitis  Has Fuller catheter for urethral stricture      Problem List :         Impression :     Stage III acute kidney disease with underlying CKD stage IV A3-acceptable urine output and kidney function-plan for vascular access as soon as possible  Recent fluid overload and acute decompensated heart failure seems to be doing well with moderate dose of oral loop  Also recent sepsis thought to be from skin soft tissue and other organ infection-remain on multiple antimicrobial agent  He does have underlying urethral stricture, diabetes, atherosclerotic cardiovascular disease, anemia and debility frailty and muscle loss and protein energy wasting  Also hypertension with manual blood pressure confirmation    Recommendation/Plan  :     Need good nutrition, blood pressure control-we will start amlodipine 5 twice daily-also when his infection subsides we will try sodium glucose co transporter 2 inhibitors-I will like to make sure his Fuller catheter is out because of high risk of cystitis-goal is to good control of his cholesterol, blood pressure, fluid status, creatinine vascular access and prepare for kidney supportive therapy-from kidney standpoint he can be discharged anytime-he does not want to go to any facility-we might have to discuss with his daughter-follow clinically and biochemically-his right-sided veins are clogged total occluded-left side is very available-his ICD in the left side-I will discuss with Dr. Howie Card MD MD

## 2023-03-01 NOTE — PROGRESS NOTES
[id number] [facility] SPEECH/LANGUAGE PATHOLOGY  DAILY PROGRESS NOTE  Criss Hanna  3/1/2023  [id number]  Hyperkalemia [E87.5]  Elevated troponin [R77.8]  Acidosis, metabolic, with respiratory acidosis [E87.4]  Acute respiratory failure with hypoxia and hypercapnia (HCC) [J96.01, J96.02]  Acute on chronic heart failure, unspecified heart failure type (HCC) [I50.9]  Acute kidney injury superimposed on CKD ([facility] [address] [N17.9, N18.9]  Acute kidney injury superimposed on chronic kidney disease ([facility] [address] [N17.9, N18.9]  Allergies   Allergen Reactions    Pcn [Penicillins] Hives    Fentanyl Itching         Pt was seen this date for dysphagia treatment. IMPRESSION AND RECOMMENDATIONS:   Criss Hanna was seen for swallow treatment seated upright in bed, pt was alert, cooperative, pleasant. Pt seen with PO trials of regular solids and thin liquids via cup. Oral phase was mildly impaired characterized by intact labial seal, prolonged mastication, delayed AP transfer, and adequate oral clearance. Pharyngeal phase appears mildly impaired characterized by delayed swallow initiation. Pt with noted throat clearing and watery eyes after thin liquid wash post regular solid trial    Recommend upgrade to soft and bite sized diet and continue nectar thick liquids . Continue razier water protocol for thin water. Recommend continue feed assist. Recommend pills crushed in puree as able. SLP will continue to follow for monitoring of diet tolerance and po trials of advanced diet. Results/recommendations d/w pt, and nurse. GOALS (current status in bold):  Short-term Goals  Timeframe for Short-term Goals: Length of stay  Goal 1: Pt will tolerate soft and bite-sized diet and nectar thick liquids with no s/s of aspiration progressing  Goal 2: Pt will tolerate PO trials of advanced diet with no s/s of aspiration progressing, cont.   Goal 3: Pt/caregivers will demonstrate understanding of recommendations and POC progressing, cont.                             EDUCATION: Jaron Tesfaye water protocol and results/recommendation and POC    PAIN RATING (0-10 Scale): n/a  Time in/Time out: SLP Individual Minutes  Minutes: 20    Visit number: [id number] [facility], [place] [facility]   3/1/2023  1:55 PM

## 2023-03-02 ENCOUNTER — ANESTHESIA EVENT (OUTPATIENT)
Dept: OPERATING ROOM | Age: 73
End: 2023-03-02
Payer: MEDICARE

## 2023-03-02 LAB
ANION GAP SERPL CALCULATED.3IONS-SCNC: 9 MMOL/L (ref 4–16)
BUN SERPL-MCNC: 53 MG/DL (ref 6–23)
CALCIUM SERPL-MCNC: 8.2 MG/DL (ref 8.3–10.6)
CHLORIDE BLD-SCNC: 98 MMOL/L (ref 99–110)
CO2: 35 MMOL/L (ref 21–32)
CREAT SERPL-MCNC: 3.7 MG/DL (ref 0.9–1.3)
GFR SERPL CREATININE-BSD FRML MDRD: 17 ML/MIN/1.73M2
GLUCOSE BLD-MCNC: 110 MG/DL (ref 70–99)
GLUCOSE BLD-MCNC: 113 MG/DL (ref 70–99)
GLUCOSE BLD-MCNC: 124 MG/DL (ref 70–99)
GLUCOSE BLD-MCNC: 125 MG/DL (ref 70–99)
GLUCOSE SERPL-MCNC: 111 MG/DL (ref 70–99)
POTASSIUM SERPL-SCNC: 3.8 MMOL/L (ref 3.5–5.1)
SODIUM BLD-SCNC: 142 MMOL/L (ref 135–145)

## 2023-03-02 PROCEDURE — 2580000003 HC RX 258: Performed by: NURSE PRACTITIONER

## 2023-03-02 PROCEDURE — 36415 COLL VENOUS BLD VENIPUNCTURE: CPT

## 2023-03-02 PROCEDURE — 2060000000 HC ICU INTERMEDIATE R&B

## 2023-03-02 PROCEDURE — 6370000000 HC RX 637 (ALT 250 FOR IP): Performed by: STUDENT IN AN ORGANIZED HEALTH CARE EDUCATION/TRAINING PROGRAM

## 2023-03-02 PROCEDURE — 6360000002 HC RX W HCPCS: Performed by: NURSE PRACTITIONER

## 2023-03-02 PROCEDURE — 82962 GLUCOSE BLOOD TEST: CPT

## 2023-03-02 PROCEDURE — 80048 BASIC METABOLIC PNL TOTAL CA: CPT

## 2023-03-02 PROCEDURE — 6370000000 HC RX 637 (ALT 250 FOR IP): Performed by: INTERNAL MEDICINE

## 2023-03-02 PROCEDURE — 99222 1ST HOSP IP/OBS MODERATE 55: CPT | Performed by: INTERNAL MEDICINE

## 2023-03-02 PROCEDURE — 6370000000 HC RX 637 (ALT 250 FOR IP): Performed by: NURSE PRACTITIONER

## 2023-03-02 RX ORDER — AMLODIPINE BESYLATE 10 MG/1
10 TABLET ORAL DAILY
Status: DISCONTINUED | OUTPATIENT
Start: 2023-03-02 | End: 2023-03-07 | Stop reason: HOSPADM

## 2023-03-02 RX ADMIN — SODIUM CHLORIDE, PRESERVATIVE FREE 10 ML: 5 INJECTION INTRAVENOUS at 22:34

## 2023-03-02 RX ADMIN — TORSEMIDE 40 MG: 20 TABLET ORAL at 09:06

## 2023-03-02 RX ADMIN — METRONIDAZOLE 500 MG: 250 TABLET ORAL at 22:32

## 2023-03-02 RX ADMIN — COLLAGENASE SANTYL: 250 OINTMENT TOPICAL at 11:38

## 2023-03-02 RX ADMIN — SODIUM HYPOCHLORITE: 1.25 SOLUTION TOPICAL at 11:39

## 2023-03-02 RX ADMIN — FAMOTIDINE 20 MG: 20 TABLET ORAL at 09:06

## 2023-03-02 RX ADMIN — HEPARIN SODIUM 5000 UNITS: 5000 INJECTION INTRAVENOUS; SUBCUTANEOUS at 22:33

## 2023-03-02 RX ADMIN — METRONIDAZOLE 500 MG: 250 TABLET ORAL at 06:20

## 2023-03-02 RX ADMIN — AMLODIPINE BESYLATE 10 MG: 10 TABLET ORAL at 13:24

## 2023-03-02 RX ADMIN — SODIUM CHLORIDE, PRESERVATIVE FREE 10 ML: 5 INJECTION INTRAVENOUS at 09:11

## 2023-03-02 RX ADMIN — HEPARIN SODIUM 5000 UNITS: 5000 INJECTION INTRAVENOUS; SUBCUTANEOUS at 14:30

## 2023-03-02 RX ADMIN — SILVER SULFADIAZINE: 10 CREAM TOPICAL at 11:38

## 2023-03-02 RX ADMIN — METRONIDAZOLE 500 MG: 250 TABLET ORAL at 13:23

## 2023-03-02 RX ADMIN — ASPIRIN 81 MG CHEWABLE TABLET 81 MG: 81 TABLET CHEWABLE at 09:06

## 2023-03-02 RX ADMIN — HEPARIN SODIUM 5000 UNITS: 5000 INJECTION INTRAVENOUS; SUBCUTANEOUS at 06:20

## 2023-03-02 ASSESSMENT — ENCOUNTER SYMPTOMS
SINUS PRESSURE: 0
EYE REDNESS: 0
EYE PAIN: 0
BACK PAIN: 0
VOMITING: 0
SINUS PAIN: 0
EYE ITCHING: 0
SORE THROAT: 0
CONSTIPATION: 0
WHEEZING: 0
SHORTNESS OF BREATH: 1
COUGH: 0
ABDOMINAL PAIN: 0
NAUSEA: 0
CHEST TIGHTNESS: 0
DIARRHEA: 0

## 2023-03-02 ASSESSMENT — PAIN SCALES - GENERAL
PAINLEVEL_OUTOF10: 3
PAINLEVEL_OUTOF10: 0
PAINLEVEL_OUTOF10: 9

## 2023-03-02 ASSESSMENT — PAIN DESCRIPTION - LOCATION: LOCATION: ABDOMEN

## 2023-03-02 ASSESSMENT — LIFESTYLE VARIABLES: SMOKING_STATUS: 1

## 2023-03-02 NOTE — ANESTHESIA PRE PROCEDURE
Department of Anesthesiology  Preprocedure Note       Name:  Radha Pop   Age:  67 y.o.  :  1950                                          MRN:  [id number]         Date:  3/2/2023      Surgeon: Sun Sung):  Eliel Walker MD    Procedure: Procedure(s):  RIGHT UPPER ARM AV GRAFT    Medications prior to admission:   Prior to Admission medications    Medication Sig Start Date End Date Taking? Authorizing Provider   gentamicin (GARAMYCIN) 0.1 % cream Apply topically 3 times daily 23   MICKY Ahuja CNP   torsemide (DEMADEX) 100 MG tablet TAKE 1 TABLET BY MOUTH TWICE A DAY IN THE MORNING AND IN THE EVENING 23   Veronica Cramer MD   gabapentin (NEURONTIN) 300 MG capsule Take 300 mg by mouth in the morning and at bedtime.     Historical Provider, MD   atorvastatin (LIPITOR) 40 MG tablet Take 1 tablet by mouth nightly  Patient not taking: No sig reported 22   Trevon De La Fuente MD   carvedilol (COREG) 25 MG tablet Take 1 tablet by mouth 2 times daily 22   Trevon De La Fuente MD   clopidogrel (PLAVIX) 75 MG tablet Take 1 tablet by mouth daily 22   Trevon De La Fuente MD   amLODIPine (NORVASC) 10 MG tablet Take 1 tablet by mouth daily 22   Trevon De La Fuente MD   empagliflozin (JARDIANCE) 10 MG tablet Take 1 tablet by mouth daily  Patient not taking: No sig reported 22   Trevon De La Fuente MD   metOLazone (ZAROXOLYN) 2.5 MG tablet Take 1 tablet by mouth in the morning and at bedtime 22   Trevon De La Fuente MD   sodium hypochlorite (DAKINS) 0.125 % SOLN external solution Apply topically daily 22   Trevon De La Fuente MD   aspirin 81 MG chewable tablet Take 1 tablet by mouth daily 10/25/22   Marge Ramirez MD   isosorbide mononitrate (IMDUR) 30 MG extended release tablet Take 1 tablet by mouth daily  Patient not taking: No sig reported 10/25/22   Marge Ramirez MD   magnesium oxide (MAG-OX) 400 (240 Mg) MG tablet Take 1 tablet by mouth 2 times daily 10/24/22   Cecy Cruz MD   SPIRIVA HANDIHALER 18 MCG inhalation capsule Inhale 18 mcg into the lungs daily  Patient not taking: No sig reported 6/18/21   Historical Provider, MD   albuterol sulfate HFA (VENTOLIN HFA) 108 (90 Base) MCG/ACT inhaler Inhale 2 puffs into the lungs every 4 hours as needed for Wheezing 8/14/20   Jennifer Zaman MD       Current medications:    No current facility-administered medications for this visit. No current outpatient medications on file.      Facility-Administered Medications Ordered in Other Visits   Medication Dose Route Frequency Provider Last Rate Last Admin    torsemide (DEMADEX) tablet 40 mg  40 mg Oral Daily Magno Munguia MD   40 mg at 03/02/23 0906    ondansetron (ZOFRAN) injection 4 mg  4 mg IntraVENous Q6H PRN Magno Munguia MD        ipratropium-albuterol (DUONEB) nebulizer solution 1 ampule  1 ampule Inhalation Q4H PRN Danyel Mariano MD        HYDROmorphone (DILAUDID) injection 0.25 mg  0.25 mg IntraVENous Q4H PRN Andrez Phelan MD   0.25 mg at 03/01/23 2143    metroNIDAZOLE (FLAGYL) tablet 500 mg  500 mg Oral 3 times per day Andrez Phelan MD   500 mg at 03/02/23 0620    silver sulfADIAZINE (SILVADENE) 1 % cream   Topical Daily Phil Alba PA-C   Given at 03/01/23 0905    albuterol (PROVENTIL) nebulizer solution 2.5 mg  2.5 mg Nebulization Q2H PRN Carson Haskins MD   2.5 mg at 02/18/23 0555    famotidine (PEPCID) tablet 20 mg  20 mg Per NG tube Daily MICKY Hernandez - CNP   20 mg at 03/02/23 0906    heparin flush 100 UNIT/ML injection 240 Units  240 Units IntraCATHeter PRN Magno Munguia MD   240 Units at 02/15/23 1731    heparin (porcine) injection 5,000 Units  5,000 Units SubCUTAneous 3 times per day MICKY Leavitt - CNP   5,000 Units at 03/02/23 4368    sodium hypochlorite (DAKINS) 0.125 % external solution   Irrigation Daily MICKY Hernandez CNP   Given at 03/01/23 0905    collagenase ointment   Topical Daily MICKY Kathleen - CNP   Given at 03/01/23 8401    glucose chewable tablet 16 g  4 tablet Oral PRN Sarath Cuellar MD        dextrose bolus 10% 125 mL  125 mL IntraVENous PRN Sarath Cuellar MD   Stopped at 02/12/23 0022    Or    dextrose bolus 10% 250 mL  250 mL IntraVENous PRN Sarath Cuellar MD        glucagon (rDNA) injection 1 mg  1 mg SubCUTAneous PRN Sarath Cuellar MD        dextrose 10 % infusion   IntraVENous Continuous PRN Sraath Cuellar MD        sodium chloride flush 0.9 % injection 5-40 mL  5-40 mL IntraVENous 2 times per day Anitra Kirit APRN - CNP   10 mL at 03/02/23 0911    sodium chloride flush 0.9 % injection 10 mL  10 mL IntraVENous PRN Anitra Kirit, APRN - CNP   10 mL at 02/15/23 1731    0.9 % sodium chloride infusion   IntraVENous PRN Anitra Billet, APRN - CNP 10 mL/hr at 02/21/23 0644 Rate Verify at 02/21/23 0644    aspirin chewable tablet 81 mg  81 mg Oral Daily Anitra Kirit, APRN - CNP   81 mg at 03/02/23 0906    [Held by provider] clopidogrel (PLAVIX) tablet 75 mg  75 mg Oral Daily Anitra Billet, APRN - CNP   75 mg at 02/12/23 0949       Allergies:     Allergies   Allergen Reactions    Pcn [Penicillins] Hives    Fentanyl Itching       Problem List:    Patient Active Problem List   Diagnosis Code    PAD (peripheral artery disease) ([facility]) I73.9    Chronic coronary artery disease I25.10    Biventricular ICD (implantable cardioverter-defibrillator) in place Z95.810    Chronic combined systolic and diastolic heart failure ([facility]) I50.42    Chronic kidney disease, stage III (moderate) ([facility]) N18.30    Mixed hyperlipidemia E78.2    Sick sinus syndrome ([facility] I49.5    Type 2 diabetes mellitus with diabetic polyneuropathy ([facility] E11.42    Spinal stenosis of lumbar region M48.061    Obesity, Class I, BMI 30-34.9 E66.9    S/P partial colectomy Z90.49    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    Microalbuminuria R80.9    WD-PVD (peripheral vascular disease) ([facility]) I73.9    Limb ischemia I99.8    Necrotic toes (HCC) I96    Toe gangrene (HCC) I96    Diabetic foot infection (HCC) E11.628, L08.9    Chronic kidney disease (CKD) stage G3a/A2, moderately decreased glomerular filtration rate (GFR) between 45-59 mL/min/1.73 square meter and albuminuria creatinine ratio between  mg/g ([facility]) N18.31    Edema R60.9    ICD (implantable cardioverter-defibrillator) battery depletion Z45.02    Hyperkalemia E87.5    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[facility]) I96    Ischemia of toe I99.8    Acute kidney injury ([facility]) N17.9    Fluid overload E87.70    DM (diabetes mellitus) ([facility]) E11.9    Precordial pain R07.2    Acute chest pain R07.9    Unstable angina ([facility]) I20.0    Chronic kidney disease (CKD) stage G3a/A3, moderately decreased glomerular filtration rate (GFR) between 45-59 mL/min/1.73 square meter and albuminuria creatinine ratio greater than 300 mg/g ([facility]) N18.31    Cardiomyopathy ([facility]) I42.9    Diabetic neuropathy ([facility]) E11.40    HTN (hypertension) I10    Epigastric pain R10.13    Acute on chronic congestive heart failure ([facility]) I50.9    Leg edema R60.0    Acute on chronic systolic CHF (congestive heart failure) ([facility]) I50.23    Diabetic foot ulcer with osteomyelitis ([facility]) E11.621, E11.69, L97.509, M86.9    Visit for wound check Z51.89    Moderate malnutrition ([facility]) E44.0    Long term (current) use of antibiotics Z79.2    WD-Diabetic ulcer of toe of right foot associated with type 2 diabetes mellitus, with fat layer exposed ([facility] E11.621, L97.512    Diabetic ulcer of toe associated with type 2 diabetes mellitus, with bone involvement without evidence of necrosis ([facility]) E11.621, L97.506    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 B87.9    Persistent wound pain R52    Receiving intravenous antibiotic treatment as outpatient Z79.2    Acute on chronic respiratory failure with hypoxemia ([facility]) J96.21    WD-Chronic foot ulcer, left, with necrosis of bone ([facility]) L97.524    Acute on chronic HFrEF (heart failure with reduced ejection fraction) ([facility]) I50.23    Scrotal edema N50.89    Hypertensive urgency I16.0    Diabetic ulcer of right foot due to type 2 diabetes mellitus ([facility]) E11.621, L97.519    Cellulitis of foot L03.119    Toe osteomyelitis ([facility]) M86.9    Heart failure exacerbated by sotalol ([facility]) I50.9    CHF (congestive heart failure), NYHA class I, acute on chronic, combined ([facility]) I50.43    Acute on chronic diastolic CHF (congestive heart failure) ([facility]) I50.33    Leg swelling M79.89    Acute on chronic diastolic heart failure ([facility]) I50.33    Skin ulcer of left foot including toes with fat layer exposed ([address] L97.522    Superficial incisional surgical site infection T81.41XA    Bacteremia due to Enterococcus R78.81, B95.2    Acute respiratory failure with hypoxia and hypercapnia ([facility]) J96.01, J96.02    Acute kidney injury superimposed on CKD ([facility]) N17.9, N18.9    Diabetic foot ([facility]) E11.8    Diabetic ulcer of midfoot associated with diabetes mellitus due to underlying condition, with muscle involvement without evidence of necrosis ([facility]) K87.931, L97.405    Other seizures ([facility]) G40.89    Ulcer of both feet with fat layer exposed ([address] L97.512, L97.522       Past Medical History:        Diagnosis Date    Acid reflux     Acute MI ([address] 2004, 2008    Arthritis     Back    Broken teeth     Upper Front    CAD (coronary artery disease)     Sees Dr. Mcarthur Heads [facility])     per old chart    Cerebral artery occlusion with cerebral infarction ([address]     CHF (congestive heart failure) ([address]     preserved ejection fraction    Chronic back pain     Chronic kidney disease     Stage IV - patient of Dr Thad Smith Diabetes mellitus [facility]) Dx 1965    per old chart pt has been diabetic since age 13    Diabetic neuropathy ([address]     \"in my feet\"    H/O cardiovascular stress test 08/25/2016    H/O Doppler ultrasound 09/27/2018    Moderate disease of the right lower extremity with an JALEN of 0.72.   Moderate to severe disease of the left lower extremity with an JALEN of 0.55.   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    Neuropathy     both feet    PAD (peripheral artery disease) ([address] 09/27/2018    PVD (peripheral vascular disease) ([address]     Sick sinus syndrome ([address]     Sleep apnea     \"sleep study 3 yrs ago- could not tolerate the cpap made me too dry\"    Spinal stenosis     Teeth missing     Upper And Lower    Type 2 diabetes mellitus without complication ([address]     WD-Chronic foot ulcer, left, with necrosis of bone ([address] 11/12/2021       Past Surgical History:        Procedure Laterality Date    CARDIAC CATHETERIZATION      per old chart done 10/2014    CARDIAC CATHETERIZATION  07/14/2017    with angiography of leg    CARDIAC CATHETERIZATION  11/20/2018    PATENT STENTS OF ALL THREE MAJOR VESSELS    CARDIAC DEFIBRILLATOR PLACEMENT  06/04/2010    [employer] [address] Defibrillator Implanted    COLECTOMY Right 08/26/2016    laparascopic; robotic assisted    COLONOSCOPY  08/04/2016    CORONARY ANGIOPLASTY      \"15 Heart Stents\"    CORONARY ANGIOPLASTY WITH STENT PLACEMENT      per old chart had angio with stent to circumflex and obtuse marginal artery at [facility] 5/2010( old chart also gives hx of stent placement done 2000,2004 and 2005)    DENTAL SURGERY      Teeth Extracted In Past    IR TUNNELED CATHETER PLACEMENT GREATER THAN 5 YEARS  6/14/2021    IR TUNNELED CATHETER PLACEMENT GREATER THAN 5 YEARS 6/14/2021 [facility] SPECIAL PROCEDURES    IR TUNNELED CATHETER PLACEMENT GREATER THAN 5 YEARS  11/17/2022    IR TUNNELED CATHETER PLACEMENT GREATER THAN 5 YEARS 11/17/2022 [facility] SPECIAL PROCEDURES    PACEMAKER PLACEMENT  06/04/2010    [employer]onic [address] DR Defibrillator Implanted    TOE AMPUTATION Right 09/12/2017    Rt 3rd toe    TOE AMPUTATION Right 01/09/2018     Right 5th toe amputation and Toenails trimmed left 2,3,4 and 5th toes    TOE AMPUTATION Left 12/26/2020    LEFT GREAT TOE AMPUTATION performed by Itzel Ding MD at [facility] TOE AMPUTATION Left 7/26/2022    LEFT SECOND TOE AMPUTATION performed by Germain Smith MD at [facility] TOE AMPUTATION Right 10/20/2022    Right First TOE AMPUTATION performed by Germain Smith MD at [facility]      per old chart had balloon angioplasty right superfical femoral artery,right popliteal artery,,right ant.tibial artery, right tibioperoneal trunk, and right post.tibial artery wna stent placement right popliteal artery and superfical femoral artery 7/2012       Social History:    Social History     Tobacco Use    Smoking status: Some Days     Types: Cigars    Smokeless tobacco: Never    Tobacco comments:     Client states he has stopped smoking   Substance Use Topics    Alcohol use: No                                Ready to quit: Not Answered  Counseling given: Not Answered  Tobacco comments: Client states he has stopped smoking      Vital Signs (Current): There were no vitals filed for this visit.                                            BP Readings from Last 3 Encounters:   03/02/23 (!) 159/71   02/09/23 129/60   01/11/23 (!) 168/72       NPO Status:                                                                                 BMI:   Wt Readings from Last 3 Encounters:   02/27/23 235 lb 10.8 oz (106.9 kg)   01/11/23 260 lb (117.9 kg)   11/14/22 261 lb (118.4 kg)     There is no height or weight on file to calculate BMI.    CBC:   Lab Results   Component Value Date/Time    WBC 7.7 03/01/2023 08:26 AM    RBC 3.33 03/01/2023 08:26 AM    HGB 8.6 03/01/2023 08:26 AM    HCT 30.5 03/01/2023 08:26 AM    MCV 91.6 03/01/2023 08:26 AM    RDW 19.3 03/01/2023 08:26 AM     03/01/2023 08:26 AM       CMP:   Lab Results   Component Value Date/Time     03/02/2023 01:19 AM    K 3.8 03/02/2023 01:19 AM    K 5.1 01/10/2018 04:32 AM    CL 98 03/02/2023 01:19 AM    CO2 35 03/02/2023 01:19 AM    BUN 53 03/02/2023 01:19 AM    CREATININE 3.7 03/02/2023 01:19 AM    GFRAA 25 10/17/2022 06:50 AM    LABGLOM 17 03/02/2023 01:19 AM    GLUCOSE 111 03/02/2023 01:19 AM    PROT 6.3 02/25/2023 06:30 AM    PROT 6.3 01/21/2013 12:10 PM    CALCIUM 8.2 03/02/2023 01:19 AM    BILITOT 0.5 02/25/2023 06:30 AM    ALKPHOS 59 02/25/2023 06:30 AM    AST 10 02/25/2023 06:30 AM    ALT 6 02/25/2023 06:30 AM       POC Tests:   Recent Labs     03/02/23  0627   POCGLU 124*       Coags:   Lab Results   Component Value Date/Time    PROTIME 17.3 02/27/2023 08:00 AM    PROTIME 11.2 09/08/2011 06:02 PM    INR 1.34 02/27/2023 08:00 AM    APTT 40.3 02/27/2023 08:00 AM       HCG (If Applicable): No results found for: PREGTESTUR, PREGSERUM, HCG, HCGQUANT     ABGs:   Lab Results   Component Value Date/Time    PO2ART 89 02/11/2023 08:30 PM    VHP4DGK 51.0 02/11/2023 08:30 PM    CAI5WAT 19.0 02/11/2023 08:30 PM        Type & Screen (If Applicable):  No results found for: LABABO, LABRH    Drug/Infectious Status (If Applicable):  No results found for: HIV, HEPCAB    COVID-19 Screening (If Applicable):   Lab Results   Component Value Date/Time    COVID19 NOT DETECTED 02/11/2023 10:49 AM    COVID19 NOT DETECTED 10/29/2021 04:09 AM           Anesthesia Evaluation  Patient summary reviewed and Nursing notes reviewed no history of anesthetic complications:   Airway: Mallampati: I  TM distance: <3 FB   Neck ROM: full  Mouth opening: > = 3 FB   Dental:    (+) poor dentition      Pulmonary:   (+) sleep apnea: on noncompliant,  decreased breath sounds: bilateral current smoker                          ROS comment: cigs and thc Cardiovascular:  Exercise tolerance: no interval change,   (+) hypertension:, angina:, pacemaker: AICD and pacemaker, past MI:, CAD:, dysrhythmias:, CHF: systolic and diastolic, pulmonary hypertension: severe,                ROS comment:  Summary   This is a limited echocardiogram.   Left ventricular systolic function is normal.   Ejection fraction is visually estimated at 55%. Bowing of the interventricular septum toward the left heart due to right   ventricular volume/pressure overload. Mild left ventricular hypertrophy. Severely dilated and hypokinetic right heart. PPM wiring visualized within the right heart. Severe tricuspid regurgitation; RVSP: 64 mmHg. Severe PHTN. No evidence of any pericardial effusion. Signature      ------------------------------------------------------------------   Electronically signed by Saulo Blanc MD   (Interpreting physician) on 02/13/2023 at 01:38 PM    Atrial-paced rhythm with prolonged AV conduction   Low voltage QRS   Incomplete right bundle branch block   Cannot rule out Anterior infarct , age undetermined   Abnormal ECG   When compared with ECG of 11-NOV-2022 00:48,   Significant changes have occurred   Confirmed by [facility] ([id number]) on 2/12/2023 2:09:34 PM     ICD analysis is reviewed and is consistent with normal dual-chamber MRI safe Medtronic ICD function with stable leads and appropriate battery status for the age of the device. No therapies detected. Programming parameters are also reviewed for optimal settings for this device in this patient. Device is programmed to DDD mode lower rate of 60 bpm and less than 0.1% pacing in the ventricle and 74% sensing in both atrium and ventricle and 25% pacing in the atrium sensing in the ventricle.  OptiVol fluid index suggest labile CHF status with significant improvement in the last few weeks.  Remaining device longevity is 2.4 years.   .     Recommend every three month device check and follow up office visit as scheduled.     Natividad Olszewski, MD, 1/16/2023 5:04 PM      Neuro/Psych:   (+) seizures:, CVA:, neuromuscular disease:,             GI/Hepatic/Renal:   (+) renal disease: ESRD,           Endo/Other:    (+) DiabetesType II DM, , blood dyscrasia: anemia and anticoagulation therapy:., .                 Abdominal:             Vascular:   + PVD, aortic or cerebral, . ROS comment: On vein mapping 2/28/2023 he was incidentally found to have partially occlusive DVT in R IJ and superficial thrombus in R cephalic and R basilic veins. Hematology is consulted to comment on need for AC.2/28/23. Other Findings:             Anesthesia Plan      general     ASA 4     (Pre-reviewed 3/2/23)  Induction: intravenous. Anesthetic plan and risks discussed with patient. Plan discussed with CRNA.                     MICKY Mccall - CRNA   3/2/2023

## 2023-03-02 NOTE — PROGRESS NOTES
The patient is more awake and alert today. He is scheduled for a right upper arm AV graft tomorrow. I have educated and counseled the patient regarding the risk the benefits the expectations of graft placement for dialysis. Possible complications such as bleeding infection nerve injury steal syndrome and graft occlusion have been fully discussed. A right upper arm graft is necessary given the clinical evidence of swelling of the left arm from venous outflow obstruction. Surgery is scheduled for approximately 2 PM tomorrow.

## 2023-03-02 NOTE — CONSULTS
Hematology/Oncology Consult Note    Patient Name:  Lolita Kessler  Patient :  1950  Patient MRN:  [id number]     PCP: Junior Tracy     Date of Service: 2023      Reason for Consult: RUE DVT     Chief Complaint:    Chief Complaint   Patient presents with    Respiratory Distress     Principal Problem:    Acute respiratory failure with hypoxia and hypercapnia ([address]  Active Problems:    Acute kidney injury superimposed on CKD ([address]    Other seizures ([address]    Ulcer of both feet with fat layer exposed ([address]  Resolved Problems:    * No resolved hospital problems. *      HPI:   Lolita Kessler is a 67 y.o. male currently hospitalized for NANCY on stage IV CKD requiring temporary HD, and acute hypoxic/hypercapnic respiratory failure requiring intubation, now successfully extubated. On vein mapping 2023 he was incidentally found to have partially occlusive DVT in R IJ and superficial thrombus in R cephalic and R basilic veins. Hematology is consulted to comment on need for [facility]. On exam patient feels at his baseline and had no awareness of blood clot. He denies any R arm pain or swelling. He has had multiple lines in his R arm over the course of his hospitalization including his R forearm, R AC, and most significantly a R IJ temporary HD catheter which has since been removed. He has some SOB which is stable, oxygenating well on 2 L NC. Fatigued and with generalized mild malaise. He is afebrile and HDS .     Past Medical History:   Diagnosis Date    Acid reflux     Acute MI ([facility] ,     Arthritis     Back    Broken teeth     Upper Front    CAD (coronary artery disease)     Sees Dr. Leeanna Cornell [facility])     per old chart    Cerebral artery occlusion with cerebral infarction [facility])     CHF (congestive heart failure) ([facility]     preserved ejection fraction    Chronic back pain     Chronic kidney disease     Stage IV - patient of Dr Yvrose Elder    Diabetes mellitus [facility]) Dx 1965    per old chart pt has been diabetic since age 13    Diabetic neuropathy ([facility]     \"in my feet\"    H/O cardiovascular stress test 08/25/2016    H/O Doppler ultrasound 09/27/2018    Moderate disease of the right lower extremity with an JALEN of 0.72. Moderate to severe disease of the left lower extremity with an JALEN of 0.55.     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    Neuropathy     both feet    PAD (peripheral artery disease) ([address] 09/27/2018    PVD (peripheral vascular disease) ([address]     Sick sinus syndrome ([address]     Sleep apnea     \"sleep study 3 yrs ago- could not tolerate the cpap made me too dry\"    Spinal stenosis     Teeth missing     Upper And Lower    Type 2 diabetes mellitus without complication ([address]     WD-Chronic foot ulcer, left, with necrosis of bone ([address] 11/12/2021       Past Surgical History:   Procedure Laterality Date    CARDIAC CATHETERIZATION      per old chart done 10/2014    CARDIAC CATHETERIZATION  07/14/2017    with angiography of leg    CARDIAC CATHETERIZATION  11/20/2018    PATENT STENTS OF ALL THREE MAJOR VESSELS    CARDIAC DEFIBRILLATOR PLACEMENT  06/04/2010    [employer] [address] DR Defibrillator Implanted    COLECTOMY Right 08/26/2016    laparascopic; robotic assisted    COLONOSCOPY  08/04/2016    CORONARY ANGIOPLASTY      \"15 Heart Stents\"    CORONARY ANGIOPLASTY WITH STENT PLACEMENT      per old chart had angio with stent to circumflex and obtuse marginal artery at [facility] 5/2010( old chart also gives hx of stent placement done 2000,2004 and 2005)    DENTAL SURGERY      Teeth Extracted In Past    IR TUNNELED CATHETER PLACEMENT GREATER THAN 5 YEARS  6/14/2021    IR TUNNELED CATHETER PLACEMENT GREATER THAN 5 YEARS 6/14/2021 [facility]    IR TUNNELED CATHETER PLACEMENT GREATER THAN 5 YEARS  11/17/2022    IR TUNNELED CATHETER PLACEMENT GREATER THAN 5 YEARS 11/17/2022 [facility]    PACEMAKER PLACEMENT  06/04/2010    [employer] Secura DR Defibrillator Implanted    TOE AMPUTATION Right 09/12/2017    Rt 3rd toe    TOE AMPUTATION Right 01/09/2018     Right 5th toe amputation and Toenails trimmed left 2,3,4 and 5th toes    TOE AMPUTATION Left 12/26/2020    LEFT GREAT TOE AMPUTATION performed by Reginald Dakin, MD at [address] Left 7/26/2022    LEFT SECOND TOE AMPUTATION performed by Shital Dennis MD at [address] Right 10/20/2022    Right First TOE AMPUTATION performed by Shital Dennis MD at [address] [place] [address]      per old chart had balloon angioplasty right superfical femoral artery,right popliteal artery,,right ant.tibial artery, right tibioperoneal trunk, and right post.tibial artery wna stent placement right popliteal artery and superfical femoral artery 7/2012                                                                                Social History     Socioeconomic History    Marital status:       Spouse name: Not on file    Number of children: Not on file    Years of education: Not on file    Highest education level: Not on file   Occupational History    Not on file   Tobacco Use    Smoking status: Some Days     Types: Cigars    Smokeless tobacco: Never    Tobacco comments:     Client states he has stopped smoking   Vaping Use    Vaping Use: Never used   Substance and Sexual Activity    Alcohol use: No    Drug use: Yes     Types: Marijuana Maurita Handsome)    Sexual activity: Never   Other Topics Concern    Not on file   Social History Narrative    Not on file     Social Determinants of Health     Financial Resource Strain: Not on file   Food Insecurity: Not on file Transportation Needs: Not on file   Physical Activity: Not on file   Stress: Not on file   Social Connections: Not on file   Intimate Partner Violence: Not on file   Housing Stability: Not on file                                                                                    Family History   Problem Relation Age of Onset    Diabetes Mother     Stroke Mother     High Blood Pressure Mother     Vision Loss Mother     Cancer Father         Prostate Cancer    Diabetes Sister     Neuropathy Sister     Other Sister         \"Breathing Problems\"    Heart Disease Sister     Early Death Sister 62        Heart Complications    Cancer Brother         \"Stomach Cancer\"    High Blood Pressure Brother     Diabetes Brother     Heart Disease Brother     High Blood Pressure Brother     Cancer Son         \"Testicle Cancer\"                                                                                               Allergies   Allergen Reactions    Pcn [Penicillins] Hives    Fentanyl Itching       No current facility-administered medications on file prior to encounter. Current Outpatient Medications on File Prior to Encounter   Medication Sig Dispense Refill    gentamicin (GARAMYCIN) 0.1 % cream Apply topically 3 times daily 60 g 3    torsemide (DEMADEX) 100 MG tablet TAKE 1 TABLET BY MOUTH TWICE A DAY IN THE MORNING AND IN THE EVENING 60 tablet 3    gabapentin (NEURONTIN) 300 MG capsule Take 300 mg by mouth in the morning and at bedtime.       atorvastatin (LIPITOR) 40 MG tablet Take 1 tablet by mouth nightly (Patient not taking: No sig reported) 30 tablet 3    carvedilol (COREG) 25 MG tablet Take 1 tablet by mouth 2 times daily 60 tablet 0    clopidogrel (PLAVIX) 75 MG tablet Take 1 tablet by mouth daily 30 tablet 1    amLODIPine (NORVASC) 10 MG tablet Take 1 tablet by mouth daily 30 tablet 1    empagliflozin (JARDIANCE) 10 MG tablet Take 1 tablet by mouth daily (Patient not taking: No sig reported) 30 tablet 1    metOLazone (ZAROXOLYN) 2.5 MG tablet Take 1 tablet by mouth in the morning and at bedtime 30 tablet 0    sodium hypochlorite (DAKINS) 0.125 % SOLN external solution Apply topically daily 1 each 0    aspirin 81 MG chewable tablet Take 1 tablet by mouth daily 30 tablet 3    isosorbide mononitrate (IMDUR) 30 MG extended release tablet Take 1 tablet by mouth daily (Patient not taking: No sig reported) 30 tablet 3    magnesium oxide (MAG-OX) 400 (240 Mg) MG tablet Take 1 tablet by mouth 2 times daily 30 tablet 0    SPIRIVA HANDIHALER 18 MCG inhalation capsule Inhale 18 mcg into the lungs daily (Patient not taking: No sig reported)      albuterol sulfate HFA (VENTOLIN HFA) 108 (90 Base) MCG/ACT inhaler Inhale 2 puffs into the lungs every 4 hours as needed for Wheezing 1 Inhaler 3        Review of Systems:  Review of Systems   Constitutional:  Positive for fatigue. Negative for chills and fever. HENT:   Negative for lump/mass and trouble swallowing. Eyes:  Negative for eye problems and icterus. Respiratory:  Negative for cough and shortness of breath. Cardiovascular:  Positive for leg swelling (chronic). Negative for chest pain. Gastrointestinal:  Negative for nausea and vomiting. Genitourinary:  Negative for bladder incontinence and pelvic pain. Musculoskeletal:  Negative for arthralgias and myalgias. Skin:  Positive for wound (bilateral legs, chronic). Negative for rash. Neurological:  Negative for dizziness and headaches. Hematological:  Negative for adenopathy. Does not bruise/bleed easily. Psychiatric/Behavioral:  Negative for depression. The patient is not nervous/anxious.          Vital Signs: BP (!) 151/62   Pulse 65   Temp 98.2 °F (36.8 °C) (Oral)   Resp 11   Ht 6' (1.829 m)   Wt 235 lb 10.8 oz (106.9 kg)   SpO2 96%   BMI 31.96 kg/m²      Physical Exam:  CONSTITUTIONAL: awake, alert, cooperative, no apparent distress, chronically ill appearing, +pallor  EYES: EOM grossly intact, no conjunctival pallor, no scleral icterus  ENT: Normocephalic, without obvious abnormality, atraumatic  NECK: supple, symmetrical, no jugular venous distension  HEMATOLOGIC/LYMPHATIC: no cervical, supraclavicular or axillary lymphadenopathy   LUNGS: CTA bilaterally, no wheezes/rhonchi/rales, unlabored on 2L NC  CARDIOVASCULAR: regular rate and rhythm, normal S1 and S2, no murmur noted  ABDOMEN: Non-tender, non-distended, NABS  MUSCULOSKELETAL: full range of motion noted, tone is normal  NEUROLOGIC: awake, alert, oriented to name, place and time. Motor skills grossly intact. SKIN: warm and dry, no jaundice, no bruising or petechiae. EXTREMITIES: +1ULE edema, no RUE edema, site of removed HD catheter clean and dry, bilateral lower extremities with bandaged wounds, very dry skin.      Labs:    Lab Results   Component Value Date    WBC 7.7 03/01/2023    HGB 8.6 (L) 03/01/2023    HCT 30.5 (L) 03/01/2023    MCV 91.6 03/01/2023     03/01/2023    LYMPHOPCT 10.4 (L) 03/01/2023    RBC 3.33 (L) 03/01/2023    MCH 25.8 (L) 03/01/2023    MCHC 28.2 (L) 03/01/2023    RDW 19.3 (H) 03/01/2023           Lab Results   Component Value Date    INR 1.34 02/27/2023    PROTIME 17.3 (H) 02/27/2023     Lab Results   Component Value Date     03/02/2023    K 3.8 03/02/2023    CL 98 (L) 03/02/2023    CO2 35 (H) 03/02/2023    BUN 53 (H) 03/02/2023    CREATININE 3.7 (H) 03/02/2023    GLUCOSE 111 (H) 03/02/2023    CALCIUM 8.2 (L) 03/02/2023    LABALBU 2.7 (L) 02/25/2023    BILITOT 0.5 02/25/2023    ALKPHOS 59 02/25/2023    AST 10 (L) 02/25/2023    ALT 6 (L) 02/25/2023    LABGLOM 17 (L) 03/02/2023    GFRAA 25 (L) 10/17/2022    PHOS 4.1 02/24/2023    MG 1.9 02/26/2023    POCGLU 124 (H) 03/02/2023     Lab Results   Component Value Date    ALKPHOS 59 02/25/2023    AST 10 (L) 02/25/2023     Lab Results   Component Value Date    URICACID 7.0 08/28/2015     No results found for: LDH  Lab Results   Component Value Date    IRON 38 (L) 10/23/2022    TIBC 217 (L) 10/23/2022    FERRITIN 66 10/23/2022       Imaging:  VL VESSEL MAPPING PRIOR TO HEMODIALYSIS ACCESS   Final Result   Partially occlusive thrombus right internal jugular vein without significant   reduction luminal caliber, partial occlusive thrombus right cephalic vein   with significant/near complete loss of lumen, partially occlusive thrombus   right basilic vein with less than 50% loss of luminal diameter. Basilic and cephalic luminal diameters and depth from skin surface as   described above. XR CHEST PORTABLE   Final Result   No significant change from prior exam.  Persistent pulmonary edema. VL DUP UPPER EXTREMITY VENOUS LEFT   Final Result   No evidence of DVT. XR CHEST 1 VIEW   Final Result   Similar-appearing prominence the cardiac silhouette with persistent vascular   congestion and bibasilar opacities. XR CHEST 1 VIEW   Final Result   1. Questionable trace right apical pneumothorax. 2. Pulmonary vascular congestion with suspected central predominant edema,   suggesting congestive heart failure given mild cardiomegaly and suspected   trace bilateral pleural effusions. Pneumonia could appear similar. 3. Persistent bibasilar airspace opacities appearing unchanged on the left   and decreased on the right likely due in part to passive atelectasis with   superimposed pneumonia and aspiration not excluded. XR CHEST PORTABLE   Final Result   1. Stable lines, tubes and support devices. 2. Worsening right base opacity. 3. Small left pleural effusion. XR CHEST PORTABLE   Final Result   1. No significant change life support appliances. 2. Improved aeration right lower lobe. XR CHEST PORTABLE   Final Result   1. Endotracheal tube tip projects 4.5 cm from the chayo. 2. Cardiomegaly with central pulmonary vascular congestion and layering right   pleural effusion.          XR CHEST PORTABLE   Final Result   Mildly improved moderate right pleural effusion and right mid to lower lobe   consolidation. Unremarkable appearing endotracheal tube. XR CHEST 1 VIEW   Final Result   Mild improved moderate-to-large right pleural effusion with right basilar   consolidation compared to prior CT on 02/13/2023. CT CHEST ABDOMEN PELVIS WO CONTRAST Additional Contrast? None   Final Result   1. Moderate right pleural effusion with severe atelectasis in the right lung. 2. Small left pleural effusion with moderate left lower lobe atelectasis. 3. Moderate amount of abdominal and pelvic ascites. 4. Otherwise no acute abnormality in the abdomen or pelvis. Incidental   cholelithiasis. CT HEAD WO CONTRAST   Final Result   No acute intracranial abnormality. XR CHEST PORTABLE   Final Result   Lines and tubes as above. The ETT terminates at the level of the superior   margin of the clavicles. Grossly unchanged pulmonary exam.  Findings favor cardiogenic pulmonary   edema. However a superimposed infectious process cannot be excluded. XR CHEST PORTABLE   Final Result   Findings consistent with congestive heart failure and/or bilateral   mid-basilar pneumonia. Small bibasilar pleural effusions suggested. Findings are generally worse on the right. Assessment/Plan:    R IJ DVT, superficial thrombus in R Cephalic and Basilic veins  -Incidentally found on vein mapping and asymptomatic. Very likely catheter related. -With removal of catheter no indication to treat. -D/w Dr Lamont Combs, in agreement that no [facility] is warranted    Chronic Anemia  -Hgb stable at baseline 7.5-8.5  -Most likely r/t CKD, chronic disease. Will send nutritional panel in AM  -defer NADIA to nephrology if no deficiency identified    I have independently evaluated and examined this patient today. I have reviewed radiologic and biochemical tests on this patient. Management Plan is developed mutually with Jacque Bravo PA-C.  I have reviewed above note and agree with assessment and plan

## 2023-03-02 NOTE — PROGRESS NOTES
Nephrology Progress Note  3/2/2023 9:08 AM        Subjective:   Admit Date: 2/11/2023  PCP: Robert De León    Interval History: Patient seen earlier today, this is a late entry, no major event overnight    Diet: Reasonable    ROS: Complaining of some neck pain likely musculoskeletal from laying down-his Fuller catheter successfully removed-apparently voiding very well  Urine output recorded about 1.1 L for the last 24 hours  His blood pressure recorded variable number probably need manual confirmation, but acceptable regardless, no fever    Data:     Current meds:    torsemide  40 mg Oral Daily    metroNIDAZOLE  500 mg Oral 3 times per day    silver sulfADIAZINE   Topical Daily    famotidine  20 mg Per NG tube Daily    heparin (porcine)  5,000 Units SubCUTAneous 3 times per day    sodium hypochlorite   Irrigation Daily    collagenase   Topical Daily    sodium chloride flush  5-40 mL IntraVENous 2 times per day    aspirin  81 mg Oral Daily    [Held by provider] clopidogrel  75 mg Oral Daily      dextrose      sodium chloride 10 mL/hr at 02/21/23 0644         I/O last 3 completed shifts:  In: -   Out: 2800 [Urine:2800]    CBC:   Recent Labs     02/28/23  0723 03/01/23  0826   WBC 6.9 7.7   HGB 8.0* 8.6*    204          Recent Labs     02/28/23  0723 03/01/23  0826 03/02/23  0119    144 142   K 3.9 3.9 3.8    100 98*   CO2 35* 34* 35*   BUN 61* 55* 53*   CREATININE 3.9* 3.8* 3.7*   GLUCOSE 100* 101* 111*       Lab Results   Component Value Date    CALCIUM 8.2 (L) 03/02/2023    PHOS 4.1 02/24/2023       Objective:     Vitals: BP (!) 159/71   Pulse 67   Temp 98.1 °F (36.7 °C) (Oral)   Resp 18   Ht 6' (1.829 m)   Wt 235 lb 10.8 oz (106.9 kg)   SpO2 93%   BMI 31.96 kg/m² ,    General appearance: Alert, awake and oriented but he lost a significant amount of muscle mass  HEENT: 2+ conjunctival pallor no scleral icterus  Neck: Seems supple  Lungs: No gross crackles few rhonchi  Heart: Irregular this morning, left chest wall implanted cardiac device  Abdomen: Soft, nontender  Extremities: Minimal leg edema, stasis dermatitis, left upper extremity edema which is chronic-      Problem List :         Impression :     Stage III acute kidney disease with underlying CKD stage IV A3-acceptable urine output stable BUN and creatinine  Recent fluid overload acceptable with moderate dose of oral loop  Recent sepsis, underlying hypertension, atherosclerotic cardiovascular disease and urethral stricture-looks like he is voiding okay without catheter  His venous thrombosis likely from catheter induced    Recommendation/Plan  :     The plan is to do AV graft tomorrow-I will have Dr. Annia Quevedo look at the vein during the surgery-also reviewed the film with the radiologist-if all chronic-no need to anticoagulate-I also discussed with Dr. Lizbeth Robison have to hold anticoagulation for surgery tomorrow anyway-if there is any suspicion for acute clot-we will consider low-dose anticoagulation after AV graft placement-when the risk of bleeding is acceptable-follow clinically otherwise      Everette Delgado MD MD

## 2023-03-02 NOTE — PROGRESS NOTES
V2.0  [facility] [place]      Name:  Tegan Adamson /Age/Sex: 1950  (67 y.o. male)   MRN & CSN:  [id number] & [id number] Encounter Date/Time: 3/2/2023 6:51 PM EST    Location:  -A PCP: Zeferino Villanueva Day: 20    Assessment and Plan:   Tegan Adamson is a 67 y.o. male with pmh of  CAD, HFpEF, ICD, CKD, DM who presents with Acute respiratory failure with hypoxia and hypercapnia ([address]    Plan:    Acute hypoxic Hypercapneic respiratory failure with Pneumonia: was initially placed on BIPAP but later was intubated on 2023   Patient had difficulty ventilating on 2023. Had a brief cardiopulmonary arrest as well. ET tube was replaced  Extubated on 2023    Large pleural effusion  Chest tube inserted on 2023, removed on 2023. Cardiopulmonary arrest due to difficulty in ventilation    Acute renal failure with h/o CKD stage IV  On HD initially for few days. Off since 2/15/2023. Baseline creatinine appears to be around 2.2 to 2.3 mg/DL. Creatinine stabilizing. Plan for AVG on 3/3/2023. Right UE DVT  Seen on vein mapping done 2023  We will consult hematology/oncology for recommendation on anticoagulation    Chronic anemia  Current hemoglobin appears around baseline of 8-9 mG/DL  Likely due to anemia of chronic kidney disease  Heme-onc consulted    Hyperkalemia-resolved    Elevated troponin: likely type 2 MI in the setting of respiratory as well as kidney failure, does have h/o CAD with multiple stents, Cardio on board. HFpEF:  EF 50-55% in . Repeat echo with simillar EF, hypokinetic RV with bowing of Interventricular septum towards LV. Chronic LE wounds: Wound care on board. Likely infected, Purulent drainage from RLE 1st metatarsal which is foul smelling. General surgery on consult, appreciate recs.  Wound cultures sent - growing Pseudomonas aeruginosa, Citrobacter freundii MRSA, Enterococcus faecalis, Bacteroides claudio. ID on board. Completed Zyvox and levofloxacin. Currently on metronidazole. Class II Obesity. BMI 35.82    Type II DM. Monitor off of insulin. Could benefit from orals at [place], but be wary of anything causes hypoglycema    History of urethral stricture with urinary retention. S/p Fuller catheter insertion. Fuller catheter removed on 3/1/2023. Voiding well. Diet Diet NPO Exceptions are: Ice Chips, Sips of Water with Meds   DVT Prophylaxis [] Lovenox, [x]  Heparin, [] SCDs, [] Ambulation,  [] Eliquis, [] Xarelto  [] Coumadin   Code Status Full Code   Disposition From: Home  Expected Disposition: TBD  Estimated Date of Discharge: 2-4 days  Patient requires continued admission due to Respiratory and renal failure   Surrogate Decision Maker/ POA      Subjective:     Chief Complaint: Respiratory Distress     Seen and examined in the morning. No new complaints. Review of Systems:    Review of Systems   Constitutional:  Negative for appetite change, chills, fatigue, fever and unexpected weight change. HENT:  Negative for sinus pressure, sinus pain and sore throat. Eyes:  Negative for pain, redness and itching. Respiratory:  Positive for shortness of breath. Negative for cough, chest tightness and wheezing. Cardiovascular:  Negative for chest pain, palpitations and leg swelling. Gastrointestinal:  Negative for abdominal pain, constipation, diarrhea, nausea and vomiting. Endocrine: Negative for polydipsia, polyphagia and polyuria. Genitourinary:  Negative for decreased urine volume, difficulty urinating, dysuria, frequency, hematuria and urgency. Musculoskeletal:  Positive for myalgias. Negative for arthralgias and back pain. Skin:  Positive for wound. Negative for pallor and rash. Neurological:  Negative for dizziness, tremors, seizures, syncope, weakness, light-headedness, numbness and headaches. Psychiatric/Behavioral:  Negative for agitation and confusion. Objective: Intake/Output Summary (Last 24 hours) at 3/2/2023 1157  Last data filed at 3/2/2023 1101  Gross per 24 hour   Intake --   Output 1500 ml   Net -1500 ml          Vitals:   Vitals:    03/02/23 1100   BP: (!) 164/75   Pulse: 72   Resp: 14   Temp:    SpO2: 94%       Physical Exam:   Physical Exam  Vitals and nursing note reviewed. Constitutional:       General: He is not in acute distress. Appearance: He is obese. He is not ill-appearing, toxic-appearing or diaphoretic. Interventions: Nasal cannula in place. HENT:      Head: Normocephalic and atraumatic. Right Ear: External ear normal.      Left Ear: External ear normal.      Nose: Nose normal.      Mouth/Throat:      Mouth: Mucous membranes are moist.      Pharynx: Oropharynx is clear. Eyes:      General: No scleral icterus. Right eye: No discharge. Left eye: No discharge. Conjunctiva/sclera: Conjunctivae normal.      Pupils: Pupils are equal, round, and reactive to light. Cardiovascular:      Rate and Rhythm: Normal rate and regular rhythm. Pulses: Normal pulses. Heart sounds: Normal heart sounds. No murmur heard. No friction rub. No gallop. Pulmonary:      Effort: Pulmonary effort is normal. No respiratory distress. Breath sounds: Normal breath sounds. No stridor. Transmitted upper airway sounds: Improved. No decreased breath sounds, wheezing, rhonchi or rales. Abdominal:      General: Bowel sounds are normal. There is no distension. Palpations: Abdomen is soft. There is no mass. Tenderness: There is no abdominal tenderness. There is no guarding or rebound. Hernia: No hernia is present. Musculoskeletal:      Right lower leg: No edema. Left lower leg: No edema. Comments: B/L LE bandaged   Skin:     Capillary Refill: Capillary refill takes less than 2 seconds. Neurological:      Mental Status: He is alert and oriented to person, place, and time. Medications:   Medications:    torsemide  40 mg Oral Daily    metroNIDAZOLE  500 mg Oral 3 times per day    silver sulfADIAZINE   Topical Daily    famotidine  20 mg Per NG tube Daily    heparin (porcine)  5,000 Units SubCUTAneous 3 times per day    sodium hypochlorite   Irrigation Daily    collagenase   Topical Daily    sodium chloride flush  5-40 mL IntraVENous 2 times per day    aspirin  81 mg Oral Daily    [Held by provider] clopidogrel  75 mg Oral Daily      Infusions:    dextrose      sodium chloride 10 mL/hr at 02/21/23 0644     PRN Meds: ondansetron, 4 mg, Q6H PRN  ipratropium-albuterol, 1 ampule, Q4H PRN  HYDROmorphone, 0.25 mg, Q4H PRN  albuterol, 2.5 mg, Q2H PRN  heparin flush, 240 Units, PRN  glucose, 4 tablet, PRN  dextrose bolus, 125 mL, PRN   Or  dextrose bolus, 250 mL, PRN  glucagon (rDNA), 1 mg, PRN  dextrose, , Continuous PRN  sodium chloride flush, 10 mL, PRN  sodium chloride, , PRN      Labs      Recent Results (from the past 24 hour(s))   POCT Glucose    Collection Time: 03/01/23  2:23 PM   Result Value Ref Range    POC Glucose 92 70 - 99 MG/DL   POCT Glucose    Collection Time: 03/02/23 12:30 AM   Result Value Ref Range    POC Glucose 110 (H) 70 - 99 MG/DL   Basic Metabolic Panel w/ Reflex to MG    Collection Time: 03/02/23  1:19 AM   Result Value Ref Range    Sodium 142 135 - 145 MMOL/L    Potassium 3.8 3.5 - 5.1 MMOL/L    Chloride 98 (L) 99 - 110 mMol/L    CO2 35 (H) 21 - 32 MMOL/L    Anion Gap 9 4 - 16    BUN 53 (H) 6 - 23 MG/DL    Creatinine 3.7 (H) 0.9 - 1.3 MG/DL    Est, Glom Filt Rate 17 (L) >60 mL/min/1.73m2    Glucose 111 (H) 70 - 99 MG/DL    Calcium 8.2 (L) 8.3 - 10.6 MG/DL   POCT Glucose    Collection Time: 03/02/23  6:27 AM   Result Value Ref Range    POC Glucose 124 (H) 70 - 99 MG/DL        Imaging/Diagnostics Last 24 Hours   XR CHEST PORTABLE    Result Date: 2/11/2023  EXAMINATION: ONE XRAY VIEW OF THE CHEST 2/11/2023 7:29 pm COMPARISON: Chest radiograph 02/11/2023 HISTORY: ORDERING SYSTEM PROVIDED HISTORY: ETT placement TECHNOLOGIST PROVIDED HISTORY: Reason for exam:->ETT placement Reason for Exam: et and og tube placement confirmation Additional signs and symptoms: et and og tube placement confrimation FINDINGS: Lines and tubes: -ETT terminates at the superior margin of the clavicles. -enteric tube takes a the subdiaphragmatic course and terminates out of the field of view -right-sided Cordis catheter, which terminates within the mid/upper SVC Lungs: There is indistinctness of the pulmonary vasculature with patchy opacities within the right greater than left lungs. . Pleura: Small right-sided pleural effusion. Cardiomediastinal silhouette: Enlarged cardiac silhouette. Bones: No acute osseous findings. Soft tissues: Left-sided 2 lead cardiac device. Lines and tubes as above. The ETT terminates at the level of the superior margin of the clavicles. Grossly unchanged pulmonary exam.  Findings favor cardiogenic pulmonary edema. However a superimposed infectious process cannot be excluded.        Electronically signed by Abilio Bojorquez MD on 3/2/2023 at 11:57 AM

## 2023-03-02 NOTE — CARE COORDINATION
CM reviewed chart, discussed in IDR and CM saw pt regarding SNF placement recs from OT/PT. Pt refused SNF stating that he wants to go home. He is active with [facility]. If he needs dialysis treatments his family is able to provide transportation.

## 2023-03-03 ENCOUNTER — APPOINTMENT (OUTPATIENT)
Dept: GENERAL RADIOLOGY | Age: 73
DRG: 981 | End: 2023-03-03
Payer: MEDICARE

## 2023-03-03 ENCOUNTER — ANESTHESIA (OUTPATIENT)
Dept: OPERATING ROOM | Age: 73
End: 2023-03-03
Payer: MEDICARE

## 2023-03-03 LAB
FERRITIN: 101 NG/ML (ref 30–400)
FOLATE SERPL-MCNC: 7.4 NG/ML (ref 3.1–17.5)
GLUCOSE BLD-MCNC: 102 MG/DL (ref 70–99)
GLUCOSE BLD-MCNC: 107 MG/DL (ref 70–99)
GLUCOSE BLD-MCNC: 121 MG/DL (ref 70–99)
IRON: 51 UG/DL (ref 59–158)
PCT TRANSFERRIN: 31 % (ref 10–44)
TOTAL IRON BINDING CAPACITY: 163 UG/DL (ref 250–450)
UNSATURATED IRON BINDING CAPACITY: 112 UG/DL (ref 110–370)
VITAMIN B-12: 1011 PG/ML (ref 211–911)

## 2023-03-03 PROCEDURE — 6360000002 HC RX W HCPCS: Performed by: SURGERY

## 2023-03-03 PROCEDURE — 97530 THERAPEUTIC ACTIVITIES: CPT

## 2023-03-03 PROCEDURE — 2720000010 HC SURG SUPPLY STERILE: Performed by: SURGERY

## 2023-03-03 PROCEDURE — 03170KD BYPASS RIGHT BRACHIAL ARTERY TO UPPER ARM VEIN WITH NONAUTOLOGOUS TISSUE SUBSTITUTE, OPEN APPROACH: ICD-10-PCS | Performed by: SURGERY

## 2023-03-03 PROCEDURE — 6370000000 HC RX 637 (ALT 250 FOR IP): Performed by: SURGERY

## 2023-03-03 PROCEDURE — 2580000003 HC RX 258: Performed by: SURGERY

## 2023-03-03 PROCEDURE — 2580000003 HC RX 258: Performed by: NURSE PRACTITIONER

## 2023-03-03 PROCEDURE — 97110 THERAPEUTIC EXERCISES: CPT

## 2023-03-03 PROCEDURE — 3700000000 HC ANESTHESIA ATTENDED CARE: Performed by: SURGERY

## 2023-03-03 PROCEDURE — 6360000002 HC RX W HCPCS: Performed by: NURSE ANESTHETIST, CERTIFIED REGISTERED

## 2023-03-03 PROCEDURE — 82607 VITAMIN B-12: CPT

## 2023-03-03 PROCEDURE — 2500000003 HC RX 250 WO HCPCS: Performed by: SURGERY

## 2023-03-03 PROCEDURE — 2060000000 HC ICU INTERMEDIATE R&B

## 2023-03-03 PROCEDURE — 82728 ASSAY OF FERRITIN: CPT

## 2023-03-03 PROCEDURE — 71045 X-RAY EXAM CHEST 1 VIEW: CPT

## 2023-03-03 PROCEDURE — 94618 PULMONARY STRESS TESTING: CPT

## 2023-03-03 PROCEDURE — 97535 SELF CARE MNGMENT TRAINING: CPT

## 2023-03-03 PROCEDURE — 83540 ASSAY OF IRON: CPT

## 2023-03-03 PROCEDURE — C1768 GRAFT, VASCULAR: HCPCS | Performed by: SURGERY

## 2023-03-03 PROCEDURE — 3600000003 HC SURGERY LEVEL 3 BASE: Performed by: SURGERY

## 2023-03-03 PROCEDURE — 6370000000 HC RX 637 (ALT 250 FOR IP)

## 2023-03-03 PROCEDURE — 6360000002 HC RX W HCPCS

## 2023-03-03 PROCEDURE — 2500000003 HC RX 250 WO HCPCS: Performed by: NURSE ANESTHETIST, CERTIFIED REGISTERED

## 2023-03-03 PROCEDURE — 2700000000 HC OXYGEN THERAPY PER DAY

## 2023-03-03 PROCEDURE — 80048 BASIC METABOLIC PNL TOTAL CA: CPT

## 2023-03-03 PROCEDURE — 97112 NEUROMUSCULAR REEDUCATION: CPT

## 2023-03-03 PROCEDURE — 82962 GLUCOSE BLOOD TEST: CPT

## 2023-03-03 PROCEDURE — 36415 COLL VENOUS BLD VENIPUNCTURE: CPT

## 2023-03-03 PROCEDURE — 3600000013 HC SURGERY LEVEL 3 ADDTL 15MIN: Performed by: SURGERY

## 2023-03-03 PROCEDURE — 2709999900 HC NON-CHARGEABLE SUPPLY: Performed by: SURGERY

## 2023-03-03 PROCEDURE — 83550 IRON BINDING TEST: CPT

## 2023-03-03 PROCEDURE — 97116 GAIT TRAINING THERAPY: CPT

## 2023-03-03 PROCEDURE — 7100000001 HC PACU RECOVERY - ADDTL 15 MIN: Performed by: SURGERY

## 2023-03-03 PROCEDURE — 2580000003 HC RX 258: Performed by: NURSE ANESTHETIST, CERTIFIED REGISTERED

## 2023-03-03 PROCEDURE — 7100000000 HC PACU RECOVERY - FIRST 15 MIN: Performed by: SURGERY

## 2023-03-03 PROCEDURE — 82746 ASSAY OF FOLIC ACID SERUM: CPT

## 2023-03-03 PROCEDURE — 3700000001 HC ADD 15 MINUTES (ANESTHESIA): Performed by: SURGERY

## 2023-03-03 PROCEDURE — 94761 N-INVAS EAR/PLS OXIMETRY MLT: CPT

## 2023-03-03 DEVICE — GRAFT VASC L30CM ID6MM BOV CAR ART CLLGN FOR FUNC HAD ACCS: Type: IMPLANTABLE DEVICE | Site: ARM | Status: FUNCTIONAL

## 2023-03-03 RX ORDER — KETAMINE HCL 50MG/ML(1)
SYRINGE (ML) INTRAVENOUS PRN
Status: DISCONTINUED | OUTPATIENT
Start: 2023-03-03 | End: 2023-03-03 | Stop reason: SDUPTHER

## 2023-03-03 RX ORDER — SODIUM CHLORIDE 0.9 % (FLUSH) 0.9 %
5-40 SYRINGE (ML) INJECTION EVERY 12 HOURS SCHEDULED
Status: DISCONTINUED | OUTPATIENT
Start: 2023-03-03 | End: 2023-03-03

## 2023-03-03 RX ORDER — LABETALOL HYDROCHLORIDE 5 MG/ML
10 INJECTION, SOLUTION INTRAVENOUS
Status: DISCONTINUED | OUTPATIENT
Start: 2023-03-03 | End: 2023-03-03

## 2023-03-03 RX ORDER — ISOSORBIDE MONONITRATE 30 MG/1
30 TABLET, EXTENDED RELEASE ORAL DAILY
Status: DISCONTINUED | OUTPATIENT
Start: 2023-03-03 | End: 2023-03-07 | Stop reason: HOSPADM

## 2023-03-03 RX ORDER — PHENYLEPHRINE HYDROCHLORIDE 10 MG/ML
INJECTION INTRAVENOUS PRN
Status: DISCONTINUED | OUTPATIENT
Start: 2023-03-03 | End: 2023-03-03

## 2023-03-03 RX ORDER — LANOLIN ALCOHOL/MO/W.PET/CERES
400 CREAM (GRAM) TOPICAL 2 TIMES DAILY
Status: DISCONTINUED | OUTPATIENT
Start: 2023-03-03 | End: 2023-03-07 | Stop reason: HOSPADM

## 2023-03-03 RX ORDER — HYDRALAZINE HYDROCHLORIDE 20 MG/ML
10 INJECTION INTRAMUSCULAR; INTRAVENOUS
Status: DISCONTINUED | OUTPATIENT
Start: 2023-03-03 | End: 2023-03-03

## 2023-03-03 RX ORDER — HEPARIN SODIUM 5000 [USP'U]/ML
INJECTION, SOLUTION INTRAVENOUS; SUBCUTANEOUS
Status: COMPLETED | OUTPATIENT
Start: 2023-03-03 | End: 2023-03-03

## 2023-03-03 RX ORDER — LIDOCAINE HYDROCHLORIDE 20 MG/ML
INJECTION, SOLUTION INFILTRATION; PERINEURAL PRN
Status: DISCONTINUED | OUTPATIENT
Start: 2023-03-03 | End: 2023-03-03 | Stop reason: SDUPTHER

## 2023-03-03 RX ORDER — PROPOFOL 10 MG/ML
INJECTION, EMULSION INTRAVENOUS PRN
Status: DISCONTINUED | OUTPATIENT
Start: 2023-03-03 | End: 2023-03-03 | Stop reason: SDUPTHER

## 2023-03-03 RX ORDER — IPRATROPIUM BROMIDE AND ALBUTEROL SULFATE 2.5; .5 MG/3ML; MG/3ML
1 SOLUTION RESPIRATORY (INHALATION)
Status: CANCELLED | OUTPATIENT
Start: 2023-03-03 | End: 2023-03-04

## 2023-03-03 RX ORDER — DROPERIDOL 2.5 MG/ML
0.62 INJECTION, SOLUTION INTRAMUSCULAR; INTRAVENOUS EVERY 10 MIN PRN
Status: CANCELLED | OUTPATIENT
Start: 2023-03-03

## 2023-03-03 RX ORDER — DIPHENHYDRAMINE HYDROCHLORIDE 50 MG/ML
12.5 INJECTION INTRAMUSCULAR; INTRAVENOUS
Status: CANCELLED | OUTPATIENT
Start: 2023-03-03 | End: 2023-03-04

## 2023-03-03 RX ORDER — OXYCODONE HYDROCHLORIDE 5 MG/1
5 TABLET ORAL
Status: CANCELLED | OUTPATIENT
Start: 2023-03-03 | End: 2023-03-04

## 2023-03-03 RX ORDER — ONDANSETRON 2 MG/ML
INJECTION INTRAMUSCULAR; INTRAVENOUS PRN
Status: DISCONTINUED | OUTPATIENT
Start: 2023-03-03 | End: 2023-03-03 | Stop reason: SDUPTHER

## 2023-03-03 RX ORDER — SODIUM CHLORIDE, SODIUM LACTATE, POTASSIUM CHLORIDE, CALCIUM CHLORIDE 600; 310; 30; 20 MG/100ML; MG/100ML; MG/100ML; MG/100ML
INJECTION, SOLUTION INTRAVENOUS CONTINUOUS
Status: DISCONTINUED | OUTPATIENT
Start: 2023-03-03 | End: 2023-03-04

## 2023-03-03 RX ORDER — ATORVASTATIN CALCIUM 40 MG/1
40 TABLET, FILM COATED ORAL NIGHTLY
Status: DISCONTINUED | OUTPATIENT
Start: 2023-03-03 | End: 2023-03-07 | Stop reason: HOSPADM

## 2023-03-03 RX ORDER — SODIUM HYPOCHLORITE 1.25 MG/ML
SOLUTION TOPICAL DAILY
Status: DISCONTINUED | OUTPATIENT
Start: 2023-03-03 | End: 2023-03-07 | Stop reason: HOSPADM

## 2023-03-03 RX ORDER — BUPIVACAINE HYDROCHLORIDE 5 MG/ML
INJECTION, SOLUTION EPIDURAL; INTRACAUDAL
Status: COMPLETED | OUTPATIENT
Start: 2023-03-03 | End: 2023-03-03

## 2023-03-03 RX ORDER — HYDROCODONE BITARTRATE AND ACETAMINOPHEN 7.5; 325 MG/1; MG/1
1 TABLET ORAL EVERY 6 HOURS PRN
Status: DISCONTINUED | OUTPATIENT
Start: 2023-03-03 | End: 2023-03-07 | Stop reason: HOSPADM

## 2023-03-03 RX ORDER — SODIUM CHLORIDE 0.9 % (FLUSH) 0.9 %
5-40 SYRINGE (ML) INJECTION PRN
Status: DISCONTINUED | OUTPATIENT
Start: 2023-03-03 | End: 2023-03-03

## 2023-03-03 RX ORDER — ONDANSETRON 2 MG/ML
4 INJECTION INTRAMUSCULAR; INTRAVENOUS
Status: DISCONTINUED | OUTPATIENT
Start: 2023-03-03 | End: 2023-03-03

## 2023-03-03 RX ADMIN — HEPARIN SODIUM 5000 UNITS: 5000 INJECTION INTRAVENOUS; SUBCUTANEOUS at 21:00

## 2023-03-03 RX ADMIN — ISOSORBIDE MONONITRATE 30 MG: 30 TABLET, EXTENDED RELEASE ORAL at 17:53

## 2023-03-03 RX ADMIN — PROPOFOL 30 MG: 10 INJECTION, EMULSION INTRAVENOUS at 15:13

## 2023-03-03 RX ADMIN — PROPOFOL 50 MG: 10 INJECTION, EMULSION INTRAVENOUS at 14:11

## 2023-03-03 RX ADMIN — PHENYLEPHRINE HYDROCHLORIDE 200 MCG: 10 INJECTION INTRAVENOUS at 15:23

## 2023-03-03 RX ADMIN — PHENYLEPHRINE HYDROCHLORIDE 100 MCG: 10 INJECTION INTRAVENOUS at 14:32

## 2023-03-03 RX ADMIN — SODIUM HYPOCHLORITE: 1.25 SOLUTION TOPICAL at 08:50

## 2023-03-03 RX ADMIN — PHENYLEPHRINE HYDROCHLORIDE 200 MCG: 10 INJECTION INTRAVENOUS at 15:26

## 2023-03-03 RX ADMIN — SODIUM CHLORIDE: 9 INJECTION, SOLUTION INTRAVENOUS at 14:04

## 2023-03-03 RX ADMIN — PHENYLEPHRINE HYDROCHLORIDE 100 MCG: 10 INJECTION INTRAVENOUS at 14:21

## 2023-03-03 RX ADMIN — PHENYLEPHRINE HYDROCHLORIDE 100 MCG: 10 INJECTION INTRAVENOUS at 15:22

## 2023-03-03 RX ADMIN — Medication 25 MG: at 14:11

## 2023-03-03 RX ADMIN — LIDOCAINE HYDROCHLORIDE 2 ML: 20 INJECTION, SOLUTION INFILTRATION; PERINEURAL at 14:11

## 2023-03-03 RX ADMIN — PHENYLEPHRINE HYDROCHLORIDE 100 MCG: 10 INJECTION INTRAVENOUS at 14:54

## 2023-03-03 RX ADMIN — ONDANSETRON 4 MG: 2 INJECTION INTRAMUSCULAR; INTRAVENOUS at 15:33

## 2023-03-03 RX ADMIN — PHENYLEPHRINE HYDROCHLORIDE 100 MCG: 10 INJECTION INTRAVENOUS at 14:25

## 2023-03-03 RX ADMIN — ATORVASTATIN CALCIUM 40 MG: 40 TABLET, FILM COATED ORAL at 21:00

## 2023-03-03 RX ADMIN — PROPOFOL 50 MG: 10 INJECTION, EMULSION INTRAVENOUS at 14:13

## 2023-03-03 RX ADMIN — SODIUM CHLORIDE, PRESERVATIVE FREE 10 ML: 5 INJECTION INTRAVENOUS at 21:14

## 2023-03-03 RX ADMIN — VANCOMYCIN HYDROCHLORIDE 750 MG: 750 INJECTION, POWDER, LYOPHILIZED, FOR SOLUTION INTRAVENOUS at 14:19

## 2023-03-03 RX ADMIN — PHENYLEPHRINE HYDROCHLORIDE 100 MCG: 10 INJECTION INTRAVENOUS at 14:34

## 2023-03-03 RX ADMIN — PHENYLEPHRINE HYDROCHLORIDE 50 MCG: 10 INJECTION INTRAVENOUS at 15:20

## 2023-03-03 RX ADMIN — PHENYLEPHRINE HYDROCHLORIDE 200 MCG: 10 INJECTION INTRAVENOUS at 15:24

## 2023-03-03 RX ADMIN — PHENYLEPHRINE HYDROCHLORIDE 150 MCG: 10 INJECTION INTRAVENOUS at 14:46

## 2023-03-03 RX ADMIN — SILVER SULFADIAZINE: 10 CREAM TOPICAL at 08:50

## 2023-03-03 RX ADMIN — PROPOFOL 50 MG: 10 INJECTION, EMULSION INTRAVENOUS at 14:12

## 2023-03-03 RX ADMIN — PROPOFOL 50 MG: 10 INJECTION, EMULSION INTRAVENOUS at 14:14

## 2023-03-03 RX ADMIN — COLLAGENASE SANTYL: 250 OINTMENT TOPICAL at 08:51

## 2023-03-03 RX ADMIN — PHENYLEPHRINE HYDROCHLORIDE 150 MCG: 10 INJECTION INTRAVENOUS at 14:40

## 2023-03-03 RX ADMIN — HYDROCODONE BITARTRATE AND ACETAMINOPHEN 1 TABLET: 7.5; 325 TABLET ORAL at 20:59

## 2023-03-03 RX ADMIN — PHENYLEPHRINE HYDROCHLORIDE 200 MCG: 10 INJECTION INTRAVENOUS at 15:28

## 2023-03-03 RX ADMIN — METRONIDAZOLE 500 MG: 250 TABLET ORAL at 21:00

## 2023-03-03 RX ADMIN — MAGNESIUM OXIDE 400 MG (241.3 MG MAGNESIUM) TABLET 400 MG: TABLET at 21:01

## 2023-03-03 RX ADMIN — SODIUM CHLORIDE, POTASSIUM CHLORIDE, SODIUM LACTATE AND CALCIUM CHLORIDE: 600; 310; 30; 20 INJECTION, SOLUTION INTRAVENOUS at 17:59

## 2023-03-03 RX ADMIN — SODIUM CHLORIDE, PRESERVATIVE FREE 10 ML: 5 INJECTION INTRAVENOUS at 08:51

## 2023-03-03 ASSESSMENT — PAIN - FUNCTIONAL ASSESSMENT: PAIN_FUNCTIONAL_ASSESSMENT: 0-10

## 2023-03-03 ASSESSMENT — PAIN DESCRIPTION - DESCRIPTORS: DESCRIPTORS: ACHING

## 2023-03-03 ASSESSMENT — PAIN SCALES - GENERAL
PAINLEVEL_OUTOF10: 7
PAINLEVEL_OUTOF10: 7
PAINLEVEL_OUTOF10: 4

## 2023-03-03 NOTE — ANESTHESIA POSTPROCEDURE EVALUATION
Department of Anesthesiology  Postprocedure Note    Patient: Scarlett Box  MRN: [id number]  YOB: 1950  Date of evaluation: 3/3/2023      Procedure Summary     Date: 03/03/23 Room / Location: [address]    Anesthesia Start: 1404 Anesthesia Stop: 1611    Procedure: RIGHT UPPER ARM AV GRAFT (Right) Diagnosis:       ESRD (end stage renal disease) ([facility] [address]      (ESRD)    Surgeons: Hernan Mahoney MD Responsible Provider: Marlyn Dior MD    Anesthesia Type: General ASA Status: 4          Anesthesia Type: General    Talat Phase I: Talat Score: 7    Talat Phase II:        Anesthesia Post Evaluation    Patient location during evaluation: PACU  Patient participation: complete - patient participated  Level of consciousness: awake and alert  Airway patency: patent  Nausea & Vomiting: no nausea and no vomiting  Complications: no  Cardiovascular status: hemodynamically stable  Respiratory status: face mask and spontaneous ventilation  Hydration status: stable

## 2023-03-03 NOTE — PROGRESS NOTES
I examined the patient prior to surgery and there are no changes in the history and physical.  Presently he has a Hep-Lock in his right forearm and I instructed the nurse to remove this in the place access in the left arm if possible at this time he is scheduled for a right upper arm AV graft. Preoperative mapping shows evidence of cephalic vein and partial basilic vein occlusion patent axillary vein.   The patient has chronic swelling of the left extremity consistent with a possible outflow obstruction from a defibrillator so a right upper arm graft is my recommendation and the procedure of choice at this time

## 2023-03-03 NOTE — PROGRESS NOTES
Patient was seen in hospital for  CAD, CHF    . I am prescribing oxygen because the diagnosis and testing requires the patient to have oxygen in the home. Conditions will improve or be benefited by oxygen use. The patient is able to perform good mobility and therefore requires the use of a portable oxygen system for ambulation.

## 2023-03-03 NOTE — PROGRESS NOTES
1607 Patient arrived to PACU from OR. Monitors applied and alarms on. No drainage from sites. Report from [facility]. 1620 Patient turned and repositioned in bed.  1625 Patient declined ice chips at this time. 1628 Patient transferred out of PACU to floor. Bed side report given to nurse.

## 2023-03-03 NOTE — CARE COORDINATION
Chart reviewed. Plan for home with [address]. Pt is currently on 2L/min of oxygen. Per hospitalist during IDR possible discharge over the weekend.

## 2023-03-03 NOTE — PROGRESS NOTES
3/3/2023 12:21 PM  Patient Room #: 0892/8328-X  Patient Name: Judd iSlva    (Step 1 Done by RN if possible otherwise call Pulmonary Diagnostics)  Place patient on room air at rest for at least 30 minutes. If patient falls below 88% before 30 minutes then you can record the level and stop. Record room air saturation level _88_ %. If patient is at 88% or below, they will qualify for home oxygen and you can stop. If level does not fall below 88%, fill in level above. If indicated continue to Step 2. Signature:___Helga Dennis, RRT__ Date: _03/03/2023__  (Step 2&3 Done by RCP)  Ambulate patient on room air until saturation falls below 89%. Record level of room air saturation with ambulation___ %. Next, place patient back on ___lpm oxygen and ambulate, record level __%. (Note:  this level must show improvement from room air level done with ambulation.)  If patients saturation on room air with ambulation is 88% or below AND patient shows improvement with oxygen during ambulation, they will qualify for home oxygen and you can stop. If patient does not drop below 89%, then patient should have an overnight oximetry trending on room air to see if level falls below 88%. Complete level in Step 3 below. Room air overnight oximetry level 88 % for___  cumulative minutes. If patients room air oxygen level is < 89% for at least 5 cumulative minutes, patient will qualify for home oxygen and you can stop. (Attach Night Trending Report)    Complete order below: Diagnosis:__CAD, CHF_  Home oxygen at:  Length of Need: X Lifetime ?  3 Months     __2_lpm or __%   via  [x] nasal cannula  []mask  [] other         [x]continuous [x]  with activity  [x]  Nocturnal   [x] Portable Tanks [x]  Concentrator  [] Conserving Device        Therapist Signature:___Helga Dennis, RRT__     Date:  _03/03/2023__  Physician Signature:  __Electronically Signed in EMR_    Date:___  Physician Printed Name:  Wicho Banda MD  NPI:  [id number]    [x] Patient Qualifies      [] Patient Does NOT qualify

## 2023-03-03 NOTE — PROGRESS NOTES
Pt said he has home oxygen but does not know who his DME is.  Pt's daughter said she would find out who provided oxygen and would tell us tomorrow (Saturday). [facility] does not accept his insurance and does not provide him with oxygen.

## 2023-03-03 NOTE — PROGRESS NOTES
V2.0  [facility] [place]      Name:  Florence Turner /Age/Sex: 1950  (67 y.o. male)   MRN & CSN:  [id number] & [id number] Encounter Date/Time: 3/3/2023 12:10 PM EST    Location:  -A PCP: Agueda Jensen Day: 21    Assessment and Plan:   Florence Turner is a 67 y.o. male with pmh of CAD, HFpEF, ICD, CKD, DM who presents with Acute respiratory failure with hypoxia and hypercapnia ([facility] [address]      Plan:  Acute hypoxic hypercapnic respiratory failure with pneumonia:  -Initially placed on BiPAP but later intubated  and successfully extubated   -Currently weaned down to 2 L/min, will continue to try to wean down  -We will order home O2 eval prior to discharge  Large pleural effusion:  -Chest tube inserted , removed on 2023, chest x-ray 3/3 with small bilateral pleural effusions  Cardiopulmonary arrest due to difficulty in ventilation  Acute renal failure with history of CKD stage IV:  -On hemodialysis initially for few days, off since 2/15/2023  -Strict intake and output record, daily weight  -Avoid nephrotoxic medications  -Adjust medications to patient's creatinine clearance  -Nephrology on board, plan for AVG today 3/3, creatinine stabilizing per last reading, labs pending from today  Right upper extremity DVT:  -Seen on vein mapping done on 2023  -Heme-onc consulted, will follow for further recs regarding possible requirement for anticoagulation  Chronic anemia:  -Baseline hemoglobin 8-9  -Likely due to anemia of chronic disease  -Heme-onc consult  Hyperkalemia-resolved  Elevated troponin:  -Likely considered type II in the setting of respiratory failure as well as kidney failure, cardiology on board, patient does have history of CAD with multiple stents  HFpEF:  -EF 50-55% in , repeat echo with similar EF, hypokinetic RV with bowing of interventricular septum towards LV  Chronic lower extremity wounds:  -Wound care on board, likely infected, purulent discharge from the right lower extremity first metatarsal which is foul-smelling. General surgery on consult, wound care culture sent, growing Pseudomonas aeruginosa and Citrobacter, MRSA, Enterococcus faecalis, bacteroids. ID on board, completed Zyvox and levofloxacin, currently on metronidazole per ID till 3/8  Class II obesity:  -BMI 35  Class II diabetes mellitus:  -Monitor off insulin, could benefit from oral medications at discharge, but very of anything causes hypoglycemia  History of urethral stricture:  -With urinary retention, status post Fuller catheter insertion, Fuller catheter removed on 3/1/2023 last reported to be voiding well will discuss with staff to update urine output for today    Diet Diet NPO Exceptions are: Ice Chips, Sips of Water with Meds   DVT Prophylaxis [] Lovenox, [x]  Heparin, [] SCDs, [] Ambulation,  [] Eliquis, [] Xarelto  [] Coumadin   Code Status Full Code   Disposition From: Home  Expected Disposition: TBD  Estimated Date of Discharge: 1-2 days  Patient requires continued admission due to respiratory and renal failure   Surrogate Decision Maker/ POA      Subjective:     Chief Complaint: Respiratory Distress       Lewis Morillo is a 67 y.o. male who presents with aspiratory distress. Patient reports improvement in shortness of breath, will try to wean oxygen and perform home O2 eval.  Patient due for AV graft today         Review of Systems:    Review of Systems    Remarkable except as above    Objective:   No intake or output data in the 24 hours ending 03/03/23 1210     Vitals:   Vitals:    03/03/23 1100   BP: 104/87   Pulse: 65   Resp: 11   Temp: (!) 96.3 °F (35.7 °C)   SpO2: 94%       Physical Exam:     General: NAD  Eyes: EOMI  ENT: neck supple  Cardiovascular: Regular rate.   Respiratory: Clear to auscultation  Gastrointestinal: Soft, non tender  Genitourinary: no suprapubic tenderness  Musculoskeletal: BiLateral lower extremity bandaged , no edema  Skin: warm, dry  Neuro: Alert. Psych: Mood appropriate. Medications:   Medications:    amLODIPine  10 mg Oral Daily    torsemide  40 mg Oral Daily    metroNIDAZOLE  500 mg Oral 3 times per day    silver sulfADIAZINE   Topical Daily    famotidine  20 mg Per NG tube Daily    heparin (porcine)  5,000 Units SubCUTAneous 3 times per day    sodium hypochlorite   Irrigation Daily    collagenase   Topical Daily    sodium chloride flush  5-40 mL IntraVENous 2 times per day    aspirin  81 mg Oral Daily    [Held by provider] clopidogrel  75 mg Oral Daily      Infusions:    dextrose      sodium chloride 10 mL/hr at 02/21/23 0644     PRN Meds: ondansetron, 4 mg, Q6H PRN  ipratropium-albuterol, 1 ampule, Q4H PRN  HYDROmorphone, 0.25 mg, Q4H PRN  albuterol, 2.5 mg, Q2H PRN  heparin flush, 240 Units, PRN  glucose, 4 tablet, PRN  dextrose bolus, 125 mL, PRN   Or  dextrose bolus, 250 mL, PRN  glucagon (rDNA), 1 mg, PRN  dextrose, , Continuous PRN  sodium chloride flush, 10 mL, PRN  sodium chloride, , PRN        Labs      Recent Results (from the past 24 hour(s))   POCT Glucose    Collection Time: 03/02/23  1:19 PM   Result Value Ref Range    POC Glucose 113 (H) 70 - 99 MG/DL   POCT Glucose    Collection Time: 03/02/23  7:28 PM   Result Value Ref Range    POC Glucose 125 (H) 70 - 99 MG/DL   POCT Glucose    Collection Time: 03/03/23  1:52 AM   Result Value Ref Range    POC Glucose 121 (H) 70 - 99 MG/DL   POCT Glucose    Collection Time: 03/03/23  7:59 AM   Result Value Ref Range    POC Glucose 102 (H) 70 - 99 MG/DL        Imaging/Diagnostics Last 24 Hours   XR CHEST PORTABLE    Result Date: 3/3/2023  EXAMINATION: ONE XRAY VIEW OF THE CHEST 3/3/2023 9:06 am COMPARISON: 02/25/2023, 02/23/2023 HISTORY: ORDERING SYSTEM PROVIDED HISTORY: follow up TECHNOLOGIST PROVIDED HISTORY: Reason for exam:->follow up Reason for Exam: follow up FINDINGS: Left chest cardiac conduction device is unchanged. The right central catheter has been intervally removed.  The cardiomediastinal silhouette is unchanged.  Small bilateral pleural effusions persist.  Bilateral interstitial and alveolar opacities appear unchanged.  No discrete pneumothorax.     No significant change from prior exam.  Persistent bilateral airspace disease and small bilateral pleural effusions.       Electronically signed by Damir Penaloza MD on 3/3/2023 at 12:10 PM

## 2023-03-03 NOTE — PROGRESS NOTES
Physical Therapy    Physical Therapy Treatment Note  Name: Timmy Walden MRN: [id number] :   1950   Date:  3/3/2023   Admission Date: 2023 Room:  -A   Restrictions/Precautions:  fall risk; general precautions; contact; [address] with other providers:  RN clears for participation ; RN handoff  Subjective:  Patient states:  \"I'm sorry If I was mean to you guys before, that was not me\"  Pain:   Location, Type, Intensity (0/10 to 10/10):  perianal discomfort during pt care that he does not rate  Objective:    Observation:  Supine, awake, alert, agreeable. He is more clear minded and participatory this session. Tele, pulse ox, 2 L of nc O. Treatment, including education/measures:  Therapeutic Activity Training:   Therapeutic activity training was instructed today. Cues were given for safety, sequence, UE/LE placement, awareness, and balance. Activities performed today included bed mobility training, sup-sit, sit-stand, SPT. Rolling: min A in BL directions  Inc time and effort to attend to pt care needs w/ OT assist ; min A t/o sidelying to maintain position  Supine <-> sit: min A x 2 for transition of pelvis and trunk transition and steady  Seated balance: fair - progressing intermittently to fair w/ BL UE support  STS at eduin-steady - see exercise below  Sit <-> supine: mod A x 2  Positioned for comfort and pressure relief ; all needs met, call light in reach, positioned for comfort and pressure relief, gait belt for OOB  Will progress to OOB w/ Neil Cano next session as pt disposition allows    Neuro-Muscular re-education:  Cues were given for position, posture, kinesthetic sense, safety, recruitment, and rationale. Cues were verbal and/or tactile.   Seated and standing static balance w/ cues for UE support, attention to COM over MARGOTH, dynamic reaching intermittently and during participation in OT care tasks     Therapeutic Exercise:  Cues were given for technique, safety, recruitment, and rationale. Cues were verbal and/or tactile. LAQ's in seated in prep for transfer training  Provided eduin-steady, transfer training education ; additional education regarding progression of return to activity  2 STS at mod A x 2 w/ seated rest between to attend to fatigue and provide further education    Assessment / Impression:    Pt is more alert and agreeable on this date ; he demonstrates improved cognition, LE strength, functional mobility. Cont to require heavy assist for OOB, requires eduin steady for transfer training, fatigue limiting time OOB. Will cont to follow and assist in return to inc activity in prep for subacute therapy.     Patient's tolerance of treatment:  good  Barriers to improvement:  deconditioning; fatigue; pain  Plan for Next Session:    Cont w/ est DC plan (SNF)  Progress functional mobility, balance training, transfer training, LE strength    Time in:  1152  Time out:  1240  Timed treatment minutes:  38  Total treatment time:  50    Previously filed items:  Social/Functional History  Lives With: Family, Daughter  Type of Home: House  Home Layout: Two level  Home Access: Ramped entrance  Bathroom Shower/Tub: Shower chair without back, Tub/Shower unit  Bathroom Equipment: Shower chair  Bathroom Accessibility: Not accessible  Home Equipment: Valetta Felix, Electric scooter, Walker, standard, Hospital bed  [employer] Help From: Family  ADL Assistance: Needs assistance  Bath: Independent  Dressing: Independent  Grooming: Independent  Feeding: Independent  Toileting: Independent  Homemaking Assistance: Needs assistance  Ambulation Assistance: Needs assistance  Transfer Assistance: Needs assistance  Active : No  Mode of Transportation: Car  Occupation: Retired, On disability  Patient Goals   Patient Goals : return to [address] [place] [address]  Time Frame for Short Term Goals: 1 week  Short Term Goal 1: pt will complete bed mobility at mod A  Short Term Goal 2: pt will demonstrate fair - seated balance w/ min posterior support during participation of EOB  Short Term Goal 3: pt will demonstrate 3+/5 BLE strength  Short Term Goal 4: pt will demonstrate 20 mins of seated tolerance for performance at [facility]    Electronically signed by:    Monie Rosado PT, DPT  3/3/2023, 12:54 PM

## 2023-03-03 NOTE — PROGRESS NOTES
Occupational Therapy    Occupational Therapy Treatment Note    Name: Yovana Coleman MRN: [id number] :   1950   Date:  3/3/2023   Admission Date: 2023 Room:  OR/NONE     Primary Problem:  The primary encounter diagnosis was Acute respiratory failure with hypoxia and hypercapnia ([facility] [address] Diagnoses of Acute kidney injury superimposed on chronic kidney disease ([facility] [address] Hyperkalemia, Acute on chronic heart failure, unspecified heart failure type ([facility] Elevated troponin, and Acidosis, metabolic, with respiratory acidosis were also pertinent to this visit. Restrictions/Precautions: Contact Precautions, General Precautions, Fall Risk, oxygen    Communication with other providers: RN approved session, co tx with ANSELMO    Subjective:  Patient states:  Pt agreeable to therapy session. Pain:   Location, Type, Intensity (0/10 to 10/10): Denies pain    Objective:    Observation: Pt supine in bed upon arrival.  Objective Measures:  Pt alert and oriented. Treatment, including education:  Self Care Training:   Cues were given for safety, sequence, UE/LE placement, visual cues, and balance. Activities performed today included UB/LB dressing tasks, toileting, hand hygiene at sink    Pt supine in bed upon arrival and agreeable to therapy session. Pt noted increased alertness and participation to therapy session. Pt completed bed mobility with MIN A with verbal and tactile cues for hand placement bilaterally. Pt completed use of bed pain with MAX A. Pt left on bed pan approx 10 minutes and this therapist left room. Returned back in room and therapist returned in room and completed completed bed mobility rolling with MIN A and DEP A for pericare. Pt completed donning of undergarment with DEP A. Pt completed supine to sit with MIN A x2 with increased verbal and tactile cues. Pt completed seated edge of bed to with fair- balance with UE support.  Pt seated and completed lower body dressing to mercedes socks with DEP A over bilateral dressing. Pt completed sit to stand from edge of bed with use of eduin steady with MOD A x2 with assist for hand placement and to bring hips forward and DEP A to mercedes undergarment over hips with two trials. Pt seated on edge of bed and returned of bed and  MOD A x2. Pt supine in bed with all needs met and call light in reach.      Assessment / Impression:    Patient's tolerance of treatment: fair  Adverse Reaction: None  Significant change in status and impact: Improved from initial evaluation  Barriers to improvement: None noted      Plan for Next Session:    Continue OT POC and progress standing ADLs    Time in:  1152  Time out:  1244  Timed treatment minutes:  38  Total treatment time:  48    Electronically signed by:    BETINA Salazar,   3/3/2023, 1:46 PM

## 2023-03-03 NOTE — PROGRESS NOTES
Comprehensive Nutrition Assessment    Type and Reason for Visit:  Reassess    Nutrition Recommendations/Plan:   Resume diet post procedure as per SLP  Resume thickened diabetic oral nutrition supplement once appropriate  Assist/encourage po intake  Consider appetite stimulant     Malnutrition Assessment:  Malnutrition Status: At risk for malnutrition  (03/03/23 1149)    Context:  Acute Illness       Nutrition Assessment:    NPO today for AV graft placement today. Eating better prior to noted. Will continu to follow as high nutrition risk. Nutrition Related Findings:    BUN 53, Cr 3.7, GFR 17, Glu 102   Wound Type: Multiple, Venous Stasis, Diabetic Ulcer       Current Nutrition Intake & Therapies:    Average Meal Intake: NPO  Average Supplements Intake: NPO  Diet NPO Exceptions are: Ice Chips, Sips of Water with Meds    Anthropometric Measures:  Height: 6' (182.9 cm)  Ideal Body Weight (IBW): 178 lbs (81 kg)    Admission Body Weight: 299 lb 4.8 oz (135.8 kg)  Current Body Weight: 215 lb 6.2 oz (97.7 kg), 158.3 % IBW. Weight Source: Bed Scale  Current BMI (kg/m2): 29.2  Usual Body Weight: 261 lb 7 oz (118.6 kg) (11/13/22)  % Weight Change (Calculated): 14.2  Weight Adjustment For: No Adjustment                 BMI Categories: Overweight (BMI 25.0-29. 9)    Estimated Daily Nutrient Needs:  Energy Requirements Based On: Formula  Weight Used for Energy Requirements: Current  Energy (kcal/day): 2466  Weight Used for Protein Requirements: Ideal  Protein (g/day): 97  Method Used for Fluid Requirements: Standard Renal  Fluid (ml/day): 1200    Nutrition Diagnosis:   Inadequate protein-energy intake related to impaired nutrient utilization, increase demand for energy/nutrients as evidenced by intake 0-25%, wounds, weight loss    Nutrition Interventions:   Food and/or Nutrient Delivery: Start Oral Diet, Start Oral Nutrition Supplement  Nutrition Education/Counseling: No recommendation at this time  Coordination of Nutrition Care: Continue to monitor while inpatient, Feeding Assistance/Environment Change, Speech Therapy, Swallow Evaluation       Goals:  Previous Goal Met: Progress towards Goal(s) Declining  Goals: Meet at least 75% of estimated needs       Nutrition Monitoring and Evaluation:   Behavioral-Environmental Outcomes: None Identified  Food/Nutrient Intake Outcomes: Diet Advancement/Tolerance  Physical Signs/Symptoms Outcomes: Biochemical Data, Chewing or Swallowing, GI Status, Fluid Status or Edema, Meal Time Behavior, Skin, Weight    Discharge Planning:    Continue Oral Nutrition Supplement     Emilie Stacy, [address] [place],   Contact: [phone]

## 2023-03-03 NOTE — CONSULTS
Patient to surgery at this time. Relayed to the nurse that if she did need further access after surgery to re-consult IV Therapy.

## 2023-03-03 NOTE — PROGRESS NOTES
Pt refusing morning labs to be drawn. Pt educated on importance of morning labs. Pt allowed phlebotomist to try once. Phlebot unsuccessful. Pt refused anymore attempts.

## 2023-03-03 NOTE — PROGRESS NOTES
Nephrology Progress Note  3/3/2023 8:00 AM        Subjective:   Admit Date: 2/11/2023  PCP: Aaron Boland    Interval History: No major event, going for AV graft placement at roughly 2 PM    Diet: N.p.o. of course for surgery unfortunately    ROS: No shortness of breath or confusion  Still able to void although urine output recorded only 400 cc probably not accurately recorded  No fever and acceptable blood pressure    Data:     Current meds:    amLODIPine  10 mg Oral Daily    torsemide  40 mg Oral Daily    metroNIDAZOLE  500 mg Oral 3 times per day    silver sulfADIAZINE   Topical Daily    famotidine  20 mg Per NG tube Daily    heparin (porcine)  5,000 Units SubCUTAneous 3 times per day    sodium hypochlorite   Irrigation Daily    collagenase   Topical Daily    sodium chloride flush  5-40 mL IntraVENous 2 times per day    aspirin  81 mg Oral Daily    [Held by provider] clopidogrel  75 mg Oral Daily      dextrose      sodium chloride 10 mL/hr at 02/21/23 0644         I/O last 3 completed shifts:  In: -   Out: 1200 [Urine:1200]    CBC:   Recent Labs     03/01/23  0826   WBC 7.7   HGB 8.6*             Recent Labs     03/01/23  0826 03/02/23  0119    142   K 3.9 3.8    98*   CO2 34* 35*   BUN 55* 53*   CREATININE 3.8* 3.7*   GLUCOSE 101* 111*       Lab Results   Component Value Date    CALCIUM 8.2 (L) 03/02/2023    PHOS 4.1 02/24/2023       Objective:     Vitals: BP (!) 145/70   Pulse 75   Temp 97.8 °F (36.6 °C) (Oral)   Resp 13   Ht 6' (1.829 m)   Wt 215 lb 6.2 oz (97.7 kg)   SpO2 94%   BMI 29.21 kg/m² ,    General appearance: Alert, awake and oriented he is thin and has temporal muscle wasting  HEENT: Minimal conjunctival pallor no gross scleral icterus  Neck: Seems supple with supplemental oxygen per nasal cannula  Lungs: Coarse breath sound no gross crackles  Heart: Seemed regular rate and rhythm, left chest wall implanted cardiac device which is nontender  Abdomen: Soft, nontender  Extremities: Minimal leg edema and chronic leg wound infection of skin from recent diuresis      Problem List :         Impression :     Stage III acute kidney injury with underlying CKD stage G4 A3-stable but watch for urine output and bladder obstruction given his recent urethral stricture  His volume status is acceptable at this time with underlying atherosclerotic cardiovascular disease, hypertension and dysrhythmia  Recent venous thrombosis    Recommendation/Plan  :     AV graft placement today at roughly 2 PM-watch urine output and if necessary intermittent bladder scan-we will watch for iatrogenic and is a complication-hopefully after successful AV graft creation-in recovery he will be able to be discharged-with close outpatient follow-up with me and Dr. Jatin Phillip will blood pressure if necessary-supportive care and follow clinically-no plan for systemic anticoagulation at this point      Godfrey Monroy MD MD

## 2023-03-04 LAB
ALBUMIN SERPL-MCNC: 2.7 GM/DL (ref 3.4–5)
ALP BLD-CCNC: 51 IU/L (ref 40–128)
ALT SERPL-CCNC: <5 U/L (ref 10–40)
ANION GAP SERPL CALCULATED.3IONS-SCNC: 8 MMOL/L (ref 4–16)
AST SERPL-CCNC: 13 IU/L (ref 15–37)
BASOPHILS ABSOLUTE: 0 K/CU MM
BASOPHILS RELATIVE PERCENT: 0.5 % (ref 0–1)
BILIRUB SERPL-MCNC: 0.5 MG/DL (ref 0–1)
BUN SERPL-MCNC: 48 MG/DL (ref 6–23)
CALCIUM SERPL-MCNC: 7.7 MG/DL (ref 8.3–10.6)
CHLORIDE BLD-SCNC: 100 MMOL/L (ref 99–110)
CO2: 33 MMOL/L (ref 21–32)
CREAT SERPL-MCNC: 3.8 MG/DL (ref 0.9–1.3)
DIFFERENTIAL TYPE: ABNORMAL
EOSINOPHILS ABSOLUTE: 0.2 K/CU MM
EOSINOPHILS RELATIVE PERCENT: 2.8 % (ref 0–3)
GFR SERPL CREATININE-BSD FRML MDRD: 16 ML/MIN/1.73M2
GLUCOSE BLD-MCNC: 102 MG/DL (ref 70–99)
GLUCOSE BLD-MCNC: 106 MG/DL (ref 70–99)
GLUCOSE BLD-MCNC: 149 MG/DL (ref 70–99)
GLUCOSE BLD-MCNC: 152 MG/DL (ref 70–99)
GLUCOSE SERPL-MCNC: 119 MG/DL (ref 70–99)
HCT VFR BLD CALC: 25.8 % (ref 42–52)
HEMOGLOBIN: 7.4 GM/DL (ref 13.5–18)
IMMATURE NEUTROPHIL %: 0.3 % (ref 0–0.43)
LYMPHOCYTES ABSOLUTE: 1.2 K/CU MM
LYMPHOCYTES RELATIVE PERCENT: 15.3 % (ref 24–44)
MAGNESIUM: 1.5 MG/DL (ref 1.8–2.4)
MCH RBC QN AUTO: 26.1 PG (ref 27–31)
MCHC RBC AUTO-ENTMCNC: 28.7 % (ref 32–36)
MCV RBC AUTO: 91.2 FL (ref 78–100)
MONOCYTES ABSOLUTE: 0.5 K/CU MM
MONOCYTES RELATIVE PERCENT: 6.6 % (ref 0–4)
NUCLEATED RBC %: 0 %
PDW BLD-RTO: 19.9 % (ref 11.7–14.9)
PHOSPHORUS: 2.6 MG/DL (ref 2.5–4.9)
PLATELET # BLD: 168 K/CU MM (ref 140–440)
PMV BLD AUTO: 9.4 FL (ref 7.5–11.1)
POTASSIUM SERPL-SCNC: 3.8 MMOL/L (ref 3.5–5.1)
RBC # BLD: 2.83 M/CU MM (ref 4.6–6.2)
SEGMENTED NEUTROPHILS ABSOLUTE COUNT: 5.6 K/CU MM
SEGMENTED NEUTROPHILS RELATIVE PERCENT: 74.5 % (ref 36–66)
SODIUM BLD-SCNC: 141 MMOL/L (ref 135–145)
TOTAL IMMATURE NEUTOROPHIL: 0.02 K/CU MM
TOTAL NUCLEATED RBC: 0 K/CU MM
TOTAL PROTEIN: 6 GM/DL (ref 6.4–8.2)
WBC # BLD: 7.5 K/CU MM (ref 4–10.5)

## 2023-03-04 PROCEDURE — 6360000002 HC RX W HCPCS: Performed by: SURGERY

## 2023-03-04 PROCEDURE — 6360000002 HC RX W HCPCS: Performed by: STUDENT IN AN ORGANIZED HEALTH CARE EDUCATION/TRAINING PROGRAM

## 2023-03-04 PROCEDURE — 6370000000 HC RX 637 (ALT 250 FOR IP): Performed by: SURGERY

## 2023-03-04 PROCEDURE — 84100 ASSAY OF PHOSPHORUS: CPT

## 2023-03-04 PROCEDURE — 83735 ASSAY OF MAGNESIUM: CPT

## 2023-03-04 PROCEDURE — 92526 ORAL FUNCTION THERAPY: CPT | Performed by: SPEECH-LANGUAGE PATHOLOGIST

## 2023-03-04 PROCEDURE — 94761 N-INVAS EAR/PLS OXIMETRY MLT: CPT

## 2023-03-04 PROCEDURE — 82962 GLUCOSE BLOOD TEST: CPT

## 2023-03-04 PROCEDURE — 2580000003 HC RX 258: Performed by: STUDENT IN AN ORGANIZED HEALTH CARE EDUCATION/TRAINING PROGRAM

## 2023-03-04 PROCEDURE — 85025 COMPLETE CBC W/AUTO DIFF WBC: CPT

## 2023-03-04 PROCEDURE — 2580000003 HC RX 258: Performed by: SURGERY

## 2023-03-04 PROCEDURE — 6360000002 HC RX W HCPCS: Performed by: INTERNAL MEDICINE

## 2023-03-04 PROCEDURE — 36415 COLL VENOUS BLD VENIPUNCTURE: CPT

## 2023-03-04 PROCEDURE — 80053 COMPREHEN METABOLIC PANEL: CPT

## 2023-03-04 PROCEDURE — 2060000000 HC ICU INTERMEDIATE R&B

## 2023-03-04 PROCEDURE — 2700000000 HC OXYGEN THERAPY PER DAY

## 2023-03-04 PROCEDURE — 94640 AIRWAY INHALATION TREATMENT: CPT

## 2023-03-04 PROCEDURE — 6370000000 HC RX 637 (ALT 250 FOR IP): Performed by: INTERNAL MEDICINE

## 2023-03-04 PROCEDURE — 94664 DEMO&/EVAL PT USE INHALER: CPT

## 2023-03-04 RX ORDER — MAGNESIUM SULFATE 4 G/50ML
4000 INJECTION INTRAVENOUS ONCE
Status: COMPLETED | OUTPATIENT
Start: 2023-03-04 | End: 2023-03-04

## 2023-03-04 RX ADMIN — MAGNESIUM OXIDE 400 MG (241.3 MG MAGNESIUM) TABLET 400 MG: TABLET at 09:50

## 2023-03-04 RX ADMIN — HEPARIN SODIUM 5000 UNITS: 5000 INJECTION INTRAVENOUS; SUBCUTANEOUS at 21:30

## 2023-03-04 RX ADMIN — IRON SUCROSE 200 MG: 20 INJECTION, SOLUTION INTRAVENOUS at 15:00

## 2023-03-04 RX ADMIN — HYDROCODONE BITARTRATE AND ACETAMINOPHEN 1 TABLET: 7.5; 325 TABLET ORAL at 03:57

## 2023-03-04 RX ADMIN — FAMOTIDINE 20 MG: 20 TABLET ORAL at 09:48

## 2023-03-04 RX ADMIN — SODIUM CHLORIDE, PRESERVATIVE FREE 10 ML: 5 INJECTION INTRAVENOUS at 09:49

## 2023-03-04 RX ADMIN — COLLAGENASE SANTYL: 250 OINTMENT TOPICAL at 09:49

## 2023-03-04 RX ADMIN — MAGNESIUM OXIDE 400 MG (241.3 MG MAGNESIUM) TABLET 400 MG: TABLET at 21:15

## 2023-03-04 RX ADMIN — TORSEMIDE 40 MG: 20 TABLET ORAL at 09:48

## 2023-03-04 RX ADMIN — HYDROCODONE BITARTRATE AND ACETAMINOPHEN 1 TABLET: 7.5; 325 TABLET ORAL at 10:04

## 2023-03-04 RX ADMIN — ASPIRIN 81 MG CHEWABLE TABLET 81 MG: 81 TABLET CHEWABLE at 09:48

## 2023-03-04 RX ADMIN — EMPAGLIFLOZIN 10 MG: 10 TABLET, FILM COATED ORAL at 18:39

## 2023-03-04 RX ADMIN — ATORVASTATIN CALCIUM 40 MG: 40 TABLET, FILM COATED ORAL at 21:15

## 2023-03-04 RX ADMIN — SODIUM HYPOCHLORITE: 1.25 SOLUTION TOPICAL at 09:51

## 2023-03-04 RX ADMIN — AMLODIPINE BESYLATE 10 MG: 10 TABLET ORAL at 09:49

## 2023-03-04 RX ADMIN — TIOTROPIUM BROMIDE INHALATION SPRAY 2 PUFF: 3.12 SPRAY, METERED RESPIRATORY (INHALATION) at 07:59

## 2023-03-04 RX ADMIN — METRONIDAZOLE 500 MG: 250 TABLET ORAL at 14:56

## 2023-03-04 RX ADMIN — HEPARIN SODIUM 5000 UNITS: 5000 INJECTION INTRAVENOUS; SUBCUTANEOUS at 06:02

## 2023-03-04 RX ADMIN — SODIUM CHLORIDE, PRESERVATIVE FREE 10 ML: 5 INJECTION INTRAVENOUS at 23:12

## 2023-03-04 RX ADMIN — ISOSORBIDE MONONITRATE 30 MG: 30 TABLET, EXTENDED RELEASE ORAL at 09:48

## 2023-03-04 RX ADMIN — HYDROCODONE BITARTRATE AND ACETAMINOPHEN 1 TABLET: 7.5; 325 TABLET ORAL at 16:33

## 2023-03-04 RX ADMIN — MAGNESIUM SULFATE HEPTAHYDRATE 4000 MG: 80 INJECTION, SOLUTION INTRAVENOUS at 11:16

## 2023-03-04 RX ADMIN — SILVER SULFADIAZINE: 10 CREAM TOPICAL at 09:51

## 2023-03-04 RX ADMIN — HEPARIN SODIUM 5000 UNITS: 5000 INJECTION INTRAVENOUS; SUBCUTANEOUS at 14:56

## 2023-03-04 RX ADMIN — EPOETIN ALFA-EPBX 2490 UNITS: 3000 INJECTION, SOLUTION INTRAVENOUS; SUBCUTANEOUS at 18:39

## 2023-03-04 RX ADMIN — METRONIDAZOLE 500 MG: 250 TABLET ORAL at 21:15

## 2023-03-04 RX ADMIN — METRONIDAZOLE 500 MG: 250 TABLET ORAL at 06:02

## 2023-03-04 ASSESSMENT — PAIN DESCRIPTION - DESCRIPTORS
DESCRIPTORS: ACHING
DESCRIPTORS: ACHING

## 2023-03-04 ASSESSMENT — PAIN SCALES - GENERAL
PAINLEVEL_OUTOF10: 0
PAINLEVEL_OUTOF10: 7
PAINLEVEL_OUTOF10: 8
PAINLEVEL_OUTOF10: 7
PAINLEVEL_OUTOF10: 0
PAINLEVEL_OUTOF10: 7

## 2023-03-04 ASSESSMENT — PAIN DESCRIPTION - ORIENTATION
ORIENTATION: RIGHT
ORIENTATION: RIGHT

## 2023-03-04 ASSESSMENT — PAIN DESCRIPTION - LOCATION
LOCATION: ARM
LOCATION: ARM

## 2023-03-04 NOTE — PROGRESS NOTES
Patient already has home O2. Please communicate with family about bringing patients portable O2 from Home.   Thank you

## 2023-03-04 NOTE — PROGRESS NOTES
Nephrology Progress Note  3/4/2023 2:17 PM        Subjective:   Admit Date: 2/11/2023  PCP: Willow Grullon    Interval History: Patient seen in early morning, this is a late entry    He underwent AV graft placement at right brachiocephalic area uneventfully    Diet: Reasonable    ROS: No shortness of breath, PND or orthopnea  Urine output was not recorded but he reported good urine  No fever but variable blood pressure recorded need manual confirmation    Data:     Current meds:    magnesium sulfate  4,000 mg IntraVENous Once    iron sucrose  200 mg IntraVENous Once    isosorbide mononitrate  30 mg Oral Daily    magnesium oxide  400 mg Oral BID    atorvastatin  40 mg Oral Nightly    sodium hypochlorite   Topical Daily    tiotropium  2 puff Inhalation Daily    amLODIPine  10 mg Oral Daily    torsemide  40 mg Oral Daily    metroNIDAZOLE  500 mg Oral 3 times per day    silver sulfADIAZINE   Topical Daily    famotidine  20 mg Per NG tube Daily    heparin (porcine)  5,000 Units SubCUTAneous 3 times per day    sodium hypochlorite   Irrigation Daily    collagenase   Topical Daily    sodium chloride flush  5-40 mL IntraVENous 2 times per day    aspirin  81 mg Oral Daily    [Held by provider] clopidogrel  75 mg Oral Daily      dextrose      sodium chloride 10 mL/hr at 03/03/23 1404         I/O last 3 completed shifts:   In: 1000 [I.V.:750; IV Piggyback:250]  Out: 10 [Blood:10]    CBC:   Recent Labs     03/04/23  0427   WBC 7.5   HGB 7.4*             Recent Labs     03/02/23  0119 03/04/23  0427    141   K 3.8 3.8   CL 98* 100   CO2 35* 33*   BUN 53* 48*   CREATININE 3.7* 3.8*   GLUCOSE 111* 119*       Lab Results   Component Value Date    CALCIUM 7.7 (L) 03/04/2023    PHOS 2.6 03/04/2023       Objective:     Vitals: BP (!) 102/48   Pulse 67   Temp (!) 96 °F (35.6 °C) (Oral)   Resp 11   Ht 6' (1.829 m)   Wt 220 lb 3.8 oz (99.9 kg)   SpO2 100%   BMI 29.87 kg/m² ,    General appearance: Alert, awake and oriented he lost significant amount of muscle mass  HEENT: At least 2+ conjunctival pallor no scleral icterus  Neck: Supple with supplemental oxygen per nasal cannula  Lungs: Some adventitial breath sound this morning  Heart: Seems regular rate and rhythm, left chest wall implanted cardiac device which is nontender  Abdomen: Soft, nontender  Extremities: No gross leg edema, stasis dermatitis and chronic leg wound  Right upper extremity brachiocephalic AV graft with good thrill on palpation good bruit and auscultation      Problem List :         Impression :     Stage III acute kidney disease on top of CKD stage IV A3-stable so far-may have mild kidney injury from general anesthesia and surgery-  Underlying atherosclerotic cardiovascular disease, dysrhythmia and hypertension  Multifactorial anemia-partly from acute kidney injury, 6 chronic kidney disease and frequent venipuncture    Recommendation/Plan  :     Reasonable to give 1 dose of IV iron since his ferritin is 101-also erythropoiesis stimulating agent-manual blood pressure-hopefully discharge tomorrow-with close outpatient follow-up-I will start Jardiance 10 mg daily also-discontinue lactated Ringer-keep torsemide 40 daily-follow clinically and biochemically-close outpatient follow-up with me and Dr. Noemi De Paz MD MD

## 2023-03-04 NOTE — PROGRESS NOTES
[id number] [place] OF SPEECH/LANGUAGE PATHOLOGY  DAILY PROGRESS NOTE  Treva Gardner  3/4/2023  [id number]  Hyperkalemia [E87.5]  Elevated troponin [R77.8]  Acidosis, metabolic, with respiratory acidosis [E87.4]  Acute respiratory failure with hypoxia and hypercapnia (HCC) [J96.01, J96.02]  Acute on chronic heart failure, unspecified heart failure type (HCC) [I50.9]  Acute kidney injury superimposed on CKD ([address] [N17.9, N18.9]  Acute kidney injury superimposed on chronic kidney disease ([address] [N17.9, N18.9]  Allergies   Allergen Reactions    Pcn [Penicillins] Hives    Fentanyl Itching         Pt was seen this date for dysphagia treatment. IMPRESSION AND RECOMMENDATIONS:   Treva Gardner was seen for swallow treatment seated upright in bed, pt was alert, cooperative, pleasant. He reports that he has no appetite but attempted to eat regular solids (diet ordered by physician following procedure) with no difficulty. Pt was agreeable to PO trials of nectar thick and thin liquids and soft solids which he completed with functional mastication and normal clearance and 0 s/s aspiration for all trials. Pt's pharyngeal swallow appears WNL. Recommend:  Advance to Soft and bite sized diet and Thins. Likely least restrictive diet level. Total feed. No further needs.         GOALS (current status in bold):  Short-term Goals  Timeframe for Short-term Goals: Length of stay  Goal 1: Pt will tolerate soft and bite-sized diet and nectar thick liquids with no s/s of aspiration Met, DIscontinue  Goal 2: Pt will tolerate PO trials of advanced diet with no s/s of aspiration Met, Discontinue  Goal 3: Pt/caregivers will demonstrate understanding of recommendations and POC Met, Discontinue    EDUCATION:  diet level advancement with pt and RN Vale    PAIN RATING (0-10 Scale): n/a  Time in/Time out: SLP Individual Minutes  Minutes: 30    Visit number: 9    Jones Johnson MA [facility] SLP  Speech Language Pathologist    3/4/2023  3:32 PM

## 2023-03-04 NOTE — DISCHARGE INSTRUCTIONS
No strenuous activity with right arm  Keep incisions dry for 2 days then may shower  Daily ace bandage from hand to elbow and arm elevation at home

## 2023-03-04 NOTE — PROGRESS NOTES
Patient is awake and alert doing well. He does complain of some postoperative right arm pain. His right arm is warm he has excellent thrill within the graft incisions are dry  Good strength of the right hand with palpable pulse in the radial artery  Surgically doing well can be discharged home from my standpoint. Patient should return to see me in the office in 2 weeks.

## 2023-03-04 NOTE — PROGRESS NOTES
V2.0  [facility] Hospitalist Progress Note      Name:  Florence Turner /Age/Sex: 1950  (67 y.o. male)   MRN & CSN:  [id number] & [id number] Encounter Date/Time: 3/4/2023 12:10 PM EST    Location:  -A PCP: Agueda Jensen Day: 22    Assessment and Plan:   Florence Turner is a 67 y.o. male with pmh of CAD, HFpEF, ICD, CKD, DM who presents with Acute respiratory failure with hypoxia and hypercapnia ([facility] [address]      Plan:  Acute hypoxic hypercapnic respiratory failure with pneumonia:  -Initially placed on BiPAP but later intubated  and successfully extubated   -Currently weaned down to 2 L/min, will continue to try to wean down  -We will order home O2 eval prior to discharge  Large pleural effusion:  -Chest tube inserted , removed on 2023, chest x-ray 3/3 with small bilateral pleural effusions  Cardiopulmonary arrest due to difficulty in ventilation  Acute renal failure with history of CKD stage IV:  -On hemodialysis initially for few days, off since 2/15/2023  -Strict intake and output record, daily weight  -Avoid nephrotoxic medications  -Adjust medications to patient's creatinine clearance  -Nephrology on board, plan for s/p AVG 3/3, creatinine stabilizing per last reading,   Right upper extremity DVT:  -Seen on vein mapping done on 2023  -Heme-onc consulted, will follow for further recs regarding possible requirement for anticoagulation  Chronic anemia:  -Baseline hemoglobin 8-9  -Likely due to anemia of chronic disease  -Heme-onc consult        -Hb 7.4 today, Iro Panel w Ferritin 100, Iron 51, Tsat 31%, ordered for Venofer 200 mg IV and will receive procrit per Nephro.    Hyperkalemia-resolved  Elevated troponin:  -Likely considered type II in the setting of respiratory failure as well as kidney failure, cardiology on board, patient does have history of CAD with multiple stents  HFpEF:  -EF 50-55% in , repeat echo with similar EF, hypokinetic RV with bowing of interventricular septum towards LV, torsemide PO   Chronic lower extremity wounds:  -Wound care on board, likely infected, purulent discharge from the right lower extremity first metatarsal which is foul-smelling. General surgery on consult, wound care culture sent, growing Pseudomonas aeruginosa and Citrobacter, MRSA, Enterococcus faecalis, bacteroids. ID on board, completed Zyvox and levofloxacin, currently on metronidazole per ID till 3/8  Class II obesity:  -BMI 35  Class II diabetes mellitus:  -Monitor off insulin, could benefit from oral medications at discharge, but very of anything causes hypoglycemia  History of urethral stricture:  -With urinary retention, status post Fuller catheter insertion, Fuller catheter removed on 3/1/2023 last reported to be voiding well will discuss with staff to update urine output for today  Hypomagnesemia:        -Mag 1.5 Today, ordered for repletion        -Will replete to keep Mg>2    Diet ADULT DIET; Regular; 4 carb choices (60 gm/meal)  ADULT ORAL NUTRITION SUPPLEMENT; Lunch; Wound Healing Oral Supplement   DVT Prophylaxis [] Lovenox, [x]  Heparin, [] SCDs, [] Ambulation,  [] Eliquis, [] Xarelto  [] Coumadin   Code Status Full Code   Disposition From: Home  Expected Disposition: TBD  Estimated Date of Discharge: 1-2 days  Patient requires continued admission due to respiratory and renal failure   Surrogate Decision Maker/ POA      Subjective:     Chief Complaint: Respiratory Distress       Jose Manuel Pro is a 67 y.o. male who presents with aspiratory distress.     3/3:Patient reports improvement in shortness of breath, will try to wean oxygen and perform home O2 eval.  Patient due for AV graft today  3/4/23:Pt seen & examined, VSS, no recrd of urine output, will d/w staff and order bladder scan, will try to wean O2, currently on 2LPM, Hb 7.4, no symptoms of anemia, d/w nephro, Iron panel w Fe 50/ Ferritin 100, Tsat 30%, will give Venofer/ Procrit due to risk of fluid overload and no CAD symptoms.Mag 1.5 , ordered for repletion.      Review of Systems:    Review of Systems    Remarkable except as above    Objective:     Intake/Output Summary (Last 24 hours) at 3/4/2023 0929  Last data filed at 3/3/2023 1624  Gross per 24 hour   Intake 1000 ml   Output 10 ml   Net 990 ml          Vitals:   Vitals:    03/04/23 0755   BP:    Pulse: 73   Resp: 13   Temp:    SpO2: 98%       Physical Exam:     General: NAD  Eyes: EOMI  ENT: neck supple  Cardiovascular: Regular rate.  Respiratory: Clear to auscultation  Gastrointestinal: Soft, non tender  Genitourinary: no suprapubic tenderness  Musculoskeletal: BiLateral lower extremity bandaged , no edema  Skin: warm, dry  Neuro: Alert.  Psych: Mood appropriate.     Medications:   Medications:    magnesium sulfate  4,000 mg IntraVENous Once    iron sucrose  200 mg IntraVENous Once    isosorbide mononitrate  30 mg Oral Daily    magnesium oxide  400 mg Oral BID    atorvastatin  40 mg Oral Nightly    sodium hypochlorite   Topical Daily    tiotropium  2 puff Inhalation Daily    amLODIPine  10 mg Oral Daily    torsemide  40 mg Oral Daily    metroNIDAZOLE  500 mg Oral 3 times per day    silver sulfADIAZINE   Topical Daily    famotidine  20 mg Per NG tube Daily    heparin (porcine)  5,000 Units SubCUTAneous 3 times per day    sodium hypochlorite   Irrigation Daily    collagenase   Topical Daily    sodium chloride flush  5-40 mL IntraVENous 2 times per day    aspirin  81 mg Oral Daily    [Held by provider] clopidogrel  75 mg Oral Daily      Infusions:    dextrose      sodium chloride 10 mL/hr at 03/03/23 1404     PRN Meds: HYDROmorphone, 0.25 mg, Q3H PRN  HYDROcodone-acetaminophen, 1 tablet, Q6H PRN  ondansetron, 4 mg, Q6H PRN  ipratropium-albuterol, 1 ampule, Q4H PRN  albuterol, 2.5 mg, Q2H PRN  heparin flush, 240 Units, PRN  glucose, 4 tablet, PRN  dextrose bolus, 125 mL, PRN   Or  dextrose bolus, 250 mL, PRN  glucagon (rDNA), 1 mg, PRN  dextrose, , Continuous  PRN  sodium chloride flush, 10 mL, PRN  sodium chloride, , PRN      Labs      Recent Results (from the past 24 hour(s))   POCT Glucose    Collection Time: 03/03/23  1:39 PM   Result Value Ref Range    POC Glucose 107 (H) 70 - 99 MG/DL   Ferritin    Collection Time: 03/03/23  8:46 PM   Result Value Ref Range    Ferritin 101 30 - 400 NG/ML   Iron and TIBC    Collection Time: 03/03/23  8:46 PM   Result Value Ref Range    Iron 51 (L) 59 - 158 ug/dL    UIBC 112 110 - 370 ug/dL    TIBC 163 (L) 250 - 450 ug/dL    Transferrin % 31 10 - 44 %   Vitamin B12 & Folate    Collection Time: 03/03/23  8:46 PM   Result Value Ref Range    Vitamin B-12 1011 (H) 211 - 911 pg/ml    Folate 7.4 3.1 - 17.5 NG/ML   Comprehensive Metabolic Panel    Collection Time: 03/04/23  4:27 AM   Result Value Ref Range    Sodium 141 135 - 145 MMOL/L    Potassium 3.8 3.5 - 5.1 MMOL/L    Chloride 100 99 - 110 mMol/L    CO2 33 (H) 21 - 32 MMOL/L    BUN 48 (H) 6 - 23 MG/DL    Creatinine 3.8 (H) 0.9 - 1.3 MG/DL    Est, Glom Filt Rate 16 (L) >60 mL/min/1.73m2    Glucose 119 (H) 70 - 99 MG/DL    Calcium 7.7 (L) 8.3 - 10.6 MG/DL    Albumin 2.7 (L) 3.4 - 5.0 GM/DL    Total Protein 6.0 (L) 6.4 - 8.2 GM/DL    Total Bilirubin 0.5 0.0 - 1.0 MG/DL    ALT <5 (L) 10 - 40 U/L    AST 13 (L) 15 - 37 IU/L    Alkaline Phosphatase 51 40 - 128 IU/L    Anion Gap 8 4 - 16   CBC with Auto Differential    Collection Time: 03/04/23  4:27 AM   Result Value Ref Range    WBC 7.5 4.0 - 10.5 K/CU MM    RBC 2.83 (L) 4.6 - 6.2 M/CU MM    Hemoglobin 7.4 (L) 13.5 - 18.0 GM/DL    Hematocrit 25.8 (L) 42 - 52 %    MCV 91.2 78 - 100 FL    MCH 26.1 (L) 27 - 31 PG    MCHC 28.7 (L) 32.0 - 36.0 %    RDW 19.9 (H) 11.7 - 14.9 %    Platelets 617 896 - 521 K/CU MM    MPV 9.4 7.5 - 11.1 FL    Differential Type AUTOMATED DIFFERENTIAL     Segs Relative 74.5 (H) 36 - 66 %    Lymphocytes % 15.3 (L) 24 - 44 %    Monocytes % 6.6 (H) 0 - 4 %    Eosinophils % 2.8 0 - 3 %    Basophils % 0.5 0 - 1 %    Segs Absolute 5.6 K/CU MM    Lymphocytes Absolute 1.2 K/CU MM    Monocytes Absolute 0.5 K/CU MM    Eosinophils Absolute 0.2 K/CU MM    Basophils Absolute 0.0 K/CU MM    Nucleated RBC % 0.0 %    Total Nucleated RBC 0.0 K/CU MM    Total Immature Neutrophil 0.02 K/CU MM    Immature Neutrophil % 0.3 0 - 0.43 %   Phosphorus    Collection Time: 03/04/23  4:27 AM   Result Value Ref Range    Phosphorus 2.6 2.5 - 4.9 MG/DL   Magnesium    Collection Time: 03/04/23  4:27 AM   Result Value Ref Range    Magnesium 1.5 (L) 1.8 - 2.4 mg/dl   POCT Glucose    Collection Time: 03/04/23  7:30 AM   Result Value Ref Range    POC Glucose 106 (H) 70 - 99 MG/DL        Imaging/Diagnostics Last 24 Hours   XR CHEST PORTABLE    Result Date: 3/3/2023  EXAMINATION: ONE XRAY VIEW OF THE CHEST 3/3/2023 9:06 am COMPARISON: 02/25/2023, 02/23/2023 HISTORY: ORDERING SYSTEM PROVIDED HISTORY: follow up TECHNOLOGIST PROVIDED HISTORY: Reason for exam:->follow up Reason for Exam: follow up FINDINGS: Left chest cardiac conduction device is unchanged. The right central catheter has been intervally removed. The cardiomediastinal silhouette is unchanged. Small bilateral pleural effusions persist.  Bilateral interstitial and alveolar opacities appear unchanged. No discrete pneumothorax. No significant change from prior exam.  Persistent bilateral airspace disease and small bilateral pleural effusions.        Electronically signed by Rikki Rodriguez MD on 3/4/2023 at 9:29 AM

## 2023-03-05 ENCOUNTER — APPOINTMENT (OUTPATIENT)
Dept: ULTRASOUND IMAGING | Age: 73
DRG: 981 | End: 2023-03-05
Payer: MEDICARE

## 2023-03-05 LAB
ALBUMIN SERPL-MCNC: 2.8 GM/DL (ref 3.4–5)
ALP BLD-CCNC: 61 IU/L (ref 40–128)
ALT SERPL-CCNC: <5 U/L (ref 10–40)
ANION GAP SERPL CALCULATED.3IONS-SCNC: 10 MMOL/L (ref 4–16)
AST SERPL-CCNC: 13 IU/L (ref 15–37)
BASOPHILS ABSOLUTE: 0 K/CU MM
BASOPHILS RELATIVE PERCENT: 0.6 % (ref 0–1)
BILIRUB SERPL-MCNC: 0.4 MG/DL (ref 0–1)
BUN SERPL-MCNC: 48 MG/DL (ref 6–23)
CALCIUM SERPL-MCNC: 7.7 MG/DL (ref 8.3–10.6)
CHLORIDE BLD-SCNC: 99 MMOL/L (ref 99–110)
CO2: 31 MMOL/L (ref 21–32)
CREAT SERPL-MCNC: 4 MG/DL (ref 0.9–1.3)
DIFFERENTIAL TYPE: ABNORMAL
EOSINOPHILS ABSOLUTE: 0.2 K/CU MM
EOSINOPHILS RELATIVE PERCENT: 3.4 % (ref 0–3)
GFR SERPL CREATININE-BSD FRML MDRD: 15 ML/MIN/1.73M2
GLUCOSE BLD-MCNC: 132 MG/DL (ref 70–99)
GLUCOSE BLD-MCNC: 146 MG/DL (ref 70–99)
GLUCOSE SERPL-MCNC: 135 MG/DL (ref 70–99)
HCT VFR BLD CALC: 28.5 % (ref 42–52)
HEMOGLOBIN: 8.1 GM/DL (ref 13.5–18)
IMMATURE NEUTROPHIL %: 0.3 % (ref 0–0.43)
LYMPHOCYTES ABSOLUTE: 1.1 K/CU MM
LYMPHOCYTES RELATIVE PERCENT: 16.2 % (ref 24–44)
MAGNESIUM: 2.2 MG/DL (ref 1.8–2.4)
MCH RBC QN AUTO: 26.6 PG (ref 27–31)
MCHC RBC AUTO-ENTMCNC: 28.4 % (ref 32–36)
MCV RBC AUTO: 93.8 FL (ref 78–100)
MONOCYTES ABSOLUTE: 0.8 K/CU MM
MONOCYTES RELATIVE PERCENT: 11.8 % (ref 0–4)
NUCLEATED RBC %: 0 %
PDW BLD-RTO: 20.3 % (ref 11.7–14.9)
PHOSPHORUS: 2.8 MG/DL (ref 2.5–4.9)
PLATELET # BLD: 170 K/CU MM (ref 140–440)
PMV BLD AUTO: 10 FL (ref 7.5–11.1)
POTASSIUM SERPL-SCNC: 4 MMOL/L (ref 3.5–5.1)
RBC # BLD: 3.04 M/CU MM (ref 4.6–6.2)
SEGMENTED NEUTROPHILS ABSOLUTE COUNT: 4.5 K/CU MM
SEGMENTED NEUTROPHILS RELATIVE PERCENT: 67.7 % (ref 36–66)
SODIUM BLD-SCNC: 140 MMOL/L (ref 135–145)
TOTAL IMMATURE NEUTOROPHIL: 0.02 K/CU MM
TOTAL NUCLEATED RBC: 0 K/CU MM
TOTAL PROTEIN: 5.9 GM/DL (ref 6.4–8.2)
WBC # BLD: 6.7 K/CU MM (ref 4–10.5)

## 2023-03-05 PROCEDURE — 6370000000 HC RX 637 (ALT 250 FOR IP): Performed by: STUDENT IN AN ORGANIZED HEALTH CARE EDUCATION/TRAINING PROGRAM

## 2023-03-05 PROCEDURE — 2580000003 HC RX 258: Performed by: SURGERY

## 2023-03-05 PROCEDURE — 6370000000 HC RX 637 (ALT 250 FOR IP): Performed by: INTERNAL MEDICINE

## 2023-03-05 PROCEDURE — 94640 AIRWAY INHALATION TREATMENT: CPT

## 2023-03-05 PROCEDURE — 6360000002 HC RX W HCPCS: Performed by: SURGERY

## 2023-03-05 PROCEDURE — 84100 ASSAY OF PHOSPHORUS: CPT

## 2023-03-05 PROCEDURE — 83735 ASSAY OF MAGNESIUM: CPT

## 2023-03-05 PROCEDURE — 6370000000 HC RX 637 (ALT 250 FOR IP): Performed by: SURGERY

## 2023-03-05 PROCEDURE — 2700000000 HC OXYGEN THERAPY PER DAY

## 2023-03-05 PROCEDURE — 82962 GLUCOSE BLOOD TEST: CPT

## 2023-03-05 PROCEDURE — 1200000000 HC SEMI PRIVATE

## 2023-03-05 PROCEDURE — 80053 COMPREHEN METABOLIC PANEL: CPT

## 2023-03-05 PROCEDURE — 85025 COMPLETE CBC W/AUTO DIFF WBC: CPT

## 2023-03-05 PROCEDURE — 94761 N-INVAS EAR/PLS OXIMETRY MLT: CPT

## 2023-03-05 PROCEDURE — 6360000002 HC RX W HCPCS: Performed by: INTERNAL MEDICINE

## 2023-03-05 PROCEDURE — 36415 COLL VENOUS BLD VENIPUNCTURE: CPT

## 2023-03-05 RX ORDER — OXYCODONE AND ACETAMINOPHEN 7.5; 325 MG/1; MG/1
1 TABLET ORAL EVERY 8 HOURS PRN
Qty: 21 TABLET | Refills: 0 | Status: CANCELLED | OUTPATIENT
Start: 2023-03-05 | End: 2023-03-12

## 2023-03-05 RX ORDER — CLOPIDOGREL BISULFATE 75 MG/1
75 TABLET ORAL DAILY
Status: CANCELLED | OUTPATIENT
Start: 2023-03-06

## 2023-03-05 RX ORDER — LEVOFLOXACIN 750 MG/1
750 TABLET ORAL EVERY OTHER DAY
Status: DISCONTINUED | OUTPATIENT
Start: 2023-03-05 | End: 2023-03-07 | Stop reason: HOSPADM

## 2023-03-05 RX ADMIN — SODIUM CHLORIDE, PRESERVATIVE FREE 10 ML: 5 INJECTION INTRAVENOUS at 17:31

## 2023-03-05 RX ADMIN — HEPARIN SODIUM 5000 UNITS: 5000 INJECTION INTRAVENOUS; SUBCUTANEOUS at 20:29

## 2023-03-05 RX ADMIN — ATORVASTATIN CALCIUM 40 MG: 40 TABLET, FILM COATED ORAL at 20:29

## 2023-03-05 RX ADMIN — ISOSORBIDE MONONITRATE 30 MG: 30 TABLET, EXTENDED RELEASE ORAL at 10:42

## 2023-03-05 RX ADMIN — HYDROMORPHONE HYDROCHLORIDE 0.25 MG: 1 INJECTION, SOLUTION INTRAMUSCULAR; INTRAVENOUS; SUBCUTANEOUS at 21:59

## 2023-03-05 RX ADMIN — EPOETIN ALFA-EPBX 2490 UNITS: 3000 INJECTION, SOLUTION INTRAVENOUS; SUBCUTANEOUS at 17:16

## 2023-03-05 RX ADMIN — SODIUM HYPOCHLORITE: 1.25 SOLUTION TOPICAL at 17:34

## 2023-03-05 RX ADMIN — SILVER SULFADIAZINE: 10 CREAM TOPICAL at 17:41

## 2023-03-05 RX ADMIN — COLLAGENASE SANTYL: 250 OINTMENT TOPICAL at 17:38

## 2023-03-05 RX ADMIN — EMPAGLIFLOZIN 10 MG: 10 TABLET, FILM COATED ORAL at 10:42

## 2023-03-05 RX ADMIN — MAGNESIUM OXIDE 400 MG (241.3 MG MAGNESIUM) TABLET 400 MG: TABLET at 20:29

## 2023-03-05 RX ADMIN — METRONIDAZOLE 500 MG: 250 TABLET ORAL at 20:29

## 2023-03-05 RX ADMIN — METRONIDAZOLE 500 MG: 250 TABLET ORAL at 05:14

## 2023-03-05 RX ADMIN — TORSEMIDE 40 MG: 20 TABLET ORAL at 10:41

## 2023-03-05 RX ADMIN — METRONIDAZOLE 500 MG: 250 TABLET ORAL at 17:29

## 2023-03-05 RX ADMIN — SODIUM CHLORIDE, PRESERVATIVE FREE 10 ML: 5 INJECTION INTRAVENOUS at 22:58

## 2023-03-05 RX ADMIN — MAGNESIUM OXIDE 400 MG (241.3 MG MAGNESIUM) TABLET 400 MG: TABLET at 10:41

## 2023-03-05 RX ADMIN — HEPARIN SODIUM 5000 UNITS: 5000 INJECTION INTRAVENOUS; SUBCUTANEOUS at 05:14

## 2023-03-05 RX ADMIN — ASPIRIN 81 MG CHEWABLE TABLET 81 MG: 81 TABLET CHEWABLE at 10:41

## 2023-03-05 RX ADMIN — LEVOFLOXACIN 750 MG: 750 TABLET, FILM COATED ORAL at 17:29

## 2023-03-05 RX ADMIN — HYDROCODONE BITARTRATE AND ACETAMINOPHEN 1 TABLET: 7.5; 325 TABLET ORAL at 17:28

## 2023-03-05 RX ADMIN — TIOTROPIUM BROMIDE INHALATION SPRAY 2 PUFF: 3.12 SPRAY, METERED RESPIRATORY (INHALATION) at 08:03

## 2023-03-05 RX ADMIN — FAMOTIDINE 20 MG: 20 TABLET ORAL at 10:42

## 2023-03-05 RX ADMIN — AMLODIPINE BESYLATE 10 MG: 10 TABLET ORAL at 10:41

## 2023-03-05 ASSESSMENT — PAIN DESCRIPTION - LOCATION: LOCATION: FOOT

## 2023-03-05 ASSESSMENT — PAIN SCALES - GENERAL
PAINLEVEL_OUTOF10: 7
PAINLEVEL_OUTOF10: 0

## 2023-03-05 NOTE — PROGRESS NOTES
Nephrology Progress Note  3/5/2023 9:16 AM        Subjective:   Admit Date: 2/11/2023  PCP: Kevin Llamas    Interval History: No major event      Diet: Reasonable    ROS: No confusion or shortness of breath, no PND or orthopnea  Urine output recorded about 775 cc for the last 24 hours  No fever and acceptable blood pressure    Data:     Current meds:    empagliflozin  10 mg Oral Daily    epoetin grayson-epbx  25 Units/kg SubCUTAneous Daily    isosorbide mononitrate  30 mg Oral Daily    magnesium oxide  400 mg Oral BID    atorvastatin  40 mg Oral Nightly    sodium hypochlorite   Topical Daily    tiotropium  2 puff Inhalation Daily    amLODIPine  10 mg Oral Daily    torsemide  40 mg Oral Daily    metroNIDAZOLE  500 mg Oral 3 times per day    silver sulfADIAZINE   Topical Daily    famotidine  20 mg Per NG tube Daily    heparin (porcine)  5,000 Units SubCUTAneous 3 times per day    sodium hypochlorite   Irrigation Daily    collagenase   Topical Daily    sodium chloride flush  5-40 mL IntraVENous 2 times per day    aspirin  81 mg Oral Daily    [Held by provider] clopidogrel  75 mg Oral Daily      dextrose      sodium chloride 10 mL/hr at 03/03/23 1404         I/O last 3 completed shifts:  In: -   Out: 775 [Urine:775]    CBC:   Recent Labs     03/04/23  0427   WBC 7.5   HGB 7.4*             Recent Labs     03/04/23  0427      K 3.8      CO2 33*   BUN 48*   CREATININE 3.8*   GLUCOSE 119*       Lab Results   Component Value Date    CALCIUM 7.7 (L) 03/04/2023    PHOS 2.6 03/04/2023       Objective:     Vitals: BP (!) 133/54   Pulse 75   Temp 98 °F (36.7 °C)   Resp 12   Ht 6' (1.829 m)   Wt 230 lb 2.6 oz (104.4 kg)   SpO2 99%   BMI 31.22 kg/m² ,    General appearance: Alert, awake and oriented  HEENT: At least 2+ conjunctival pallor no scleral icterus  Neck: Seems supple  Lungs: Some adventitious breath sound  Heart: Regular rate and rhythm, left chest wall implanted cardiac device  Abdomen:  Soft  Extremities: Minimal leg edema, right upper extremity brachiocephalic AV graft-tender and little surrounding swelling-but good thrill on palpation and bruit on auscultation      Problem List :         Impression :     Stage III acute kidney disease on top of CKD stage IV A3-acceptable kidney function without uremia-now has AV graft placement-little bit of surrounding edema-hopefully no hematoma-was little tender  Multifactorial anemia, with underlying atherosclerotic cardiovascular disease, hypertension and dysrhythmia  Prolonged hospitalization and muscle wasting    Recommendation/Plan  :     Reasonable to discharge from my standpoint-I would continue amlodipine and Jardiance for now-low-salt, good diet-he will need a CBC with differential, CMP, magnesium and phosphorus in 5 to 7 days-follow-up with me in 10 to 12 days-also follow-up with Dr. Jennyfer Turner me if he has more weight gain, increasing edema or shortness of breath-May discharge with torsemide 40 mg daily for now-but I will adjust it as he does as an outpatient-I would also try to arrange for erythropoiesis stimulating agent as an outpatient      Rikki Latham MD MD

## 2023-03-05 NOTE — PROGRESS NOTES
When doing shift report, Alejandrina (night nurse) said she just did the dressing tx on his asa feet. Pt confirmed that the dressing was done just to his feet. Will continue to monitor.

## 2023-03-05 NOTE — PROGRESS NOTES
V2.0  [facility] Hospitalist Progress Note      Name:  Anmol Lee /Age/Sex: 1950  (72 y.o. male)   MRN & CSN:  [id number] & [id number] Encounter Date/Time: 3/5/2023 12:10 PM EST    Location:  -A PCP: Kevin Scherer [facility] Day: 23    Assessment and Plan:   Anmol Lee is a 72 y.o. male with pmh of CAD, HFpEF, ICD, CKD, DM who presents with Acute respiratory failure with hypoxia and hypercapnia (HCC)      Plan:  Acute hypoxic hypercapnic respiratory failure with pneumonia:  -Initially placed on BiPAP but later intubated  and successfully extubated   -Currently weaned down to 2 L/min, will continue to try to wean down  -We will order home O2 eval prior to discharge  Large pleural effusion:  -Chest tube inserted , removed on 2023, chest x-ray 3/3 with small bilateral pleural effusions  Cardiopulmonary arrest due to difficulty in ventilation  Acute renal failure with history of CKD stage IV:  -On hemodialysis initially for few days, off since 2/15/2023  -Strict intake and output record, daily weight  -Avoid nephrotoxic medications  -Adjust medications to patient's creatinine clearance  -Nephrology on board, plan for s/p AVG 3/3, creatinine stabilizing per last reading,   Right upper extremity DVT:  -Seen on vein mapping done on 2023  -Heme-onc consulted, will follow for further recs regarding possible requirement for anticoagulation  Chronic anemia:  -Baseline hemoglobin 8-9  -Likely due to anemia of chronic disease  -Heme-onc consult        -Hb 7.4 today, Iro Panel w Ferritin 100, Iron 51, Tsat 31%, ordered for Venofer 200 mg IV and will receive procrit per Nephro.   Hyperkalemia-resolved  Elevated troponin:  -Likely considered type II in the setting of respiratory failure as well as kidney failure, cardiology on board, patient does have history of CAD with multiple stents  HFpEF:  -EF 50-55% in , repeat echo with similar EF, hypokinetic RV with bowing of  interventricular septum towards LV, torsemide PO   Chronic lower extremity wounds:  -Wound care on board, likely infected, purulent discharge from the right lower extremity first metatarsal which is foul-smelling. General surgery on consult, wound care culture sent, growing Pseudomonas aeruginosa and Citrobacter, MRSA, Enterococcus faecalis, bacteroids. ID on board, completed Zyvox and levofloxacin, currently on metronidazole per ID till 3/8  Class II obesity:  -BMI 35  Class II diabetes mellitus:  -Monitor off insulin, could benefit from oral medications at discharge, but very of anything causes hypoglycemia  History of urethral stricture:  -With urinary retention, status post Fuller catheter insertion, Fuller catheter removed on 3/1/2023 last reported to be voiding well will discuss with staff to update urine output for today  Hypomagnesemia:        -Mag 1.5 Today, ordered for repletion        -Will replete to keep Mg>2    Diet ADULT [address]; Lunch; Wound Healing Oral Supplement  ADULT DIET; Dysphagia - Soft and Bite Sized; 4 carb choices (60 gm/meal)   DVT Prophylaxis [] Lovenox, [x]  Heparin, [] SCDs, [] Ambulation,  [] Eliquis, [] Xarelto  [] Coumadin   Code Status Full Code   Disposition From: Home  Expected Disposition: TBD  Estimated Date of Discharge: 1-2 days  Patient requires continued admission due to respiratory and renal failure   Surrogate Decision Maker/ POA      Subjective:     Chief Complaint: Respiratory Distress       Carmelita Shin is a 67 y.o. male who presents with aspiratory distress.     3/3:Patient reports improvement in shortness of breath, will try to wean oxygen and perform home O2 eval.  Patient due for AV graft today  3/4/23:Pt seen & examined, VSS, no recrd of urine output, will d/w staff and order bladder scan, will try to wean O2, currently on 2LPM, Hb 7.4, no symptoms of anemia, d/w nephro, Iron panel w Fe 50/ Ferritin 100, Tsat 30%, will give Venofer/ Procrit due to risk of fluid overload  and no CAD symptoms. Mag 1.5 , ordered for repletion. 3/5/23: Patient seen and examined, vital signs stable, awaiting CBC/cmp w mag before planning for discharge    Review of Systems:    Review of Systems    Remarkable except as above    Objective: Intake/Output Summary (Last 24 hours) at 3/5/2023 0919  Last data filed at 3/5/2023 0516  Gross per 24 hour   Intake --   Output 775 ml   Net -775 ml          Vitals:   Vitals:    03/05/23 0805   BP: (!) 133/54   Pulse: 75   Resp: 12   Temp: 98 °F (36.7 °C)   SpO2: 99%       Physical Exam:     General: NAD  Eyes: EOMI  ENT: neck supple  Cardiovascular: Regular rate. Respiratory: Clear to auscultation  Gastrointestinal: Soft, non tender  Genitourinary: no suprapubic tenderness  Musculoskeletal: BiLateral lower extremity bandaged , no edema  Skin: warm, dry  Neuro: Alert. Psych: Mood appropriate.      Medications:   Medications:    empagliflozin  10 mg Oral Daily    epoetin grayson-epbx  25 Units/kg SubCUTAneous Daily    isosorbide mononitrate  30 mg Oral Daily    magnesium oxide  400 mg Oral BID    atorvastatin  40 mg Oral Nightly    sodium hypochlorite   Topical Daily    tiotropium  2 puff Inhalation Daily    amLODIPine  10 mg Oral Daily    torsemide  40 mg Oral Daily    metroNIDAZOLE  500 mg Oral 3 times per day    silver sulfADIAZINE   Topical Daily    famotidine  20 mg Per NG tube Daily    heparin (porcine)  5,000 Units SubCUTAneous 3 times per day    sodium hypochlorite   Irrigation Daily    collagenase   Topical Daily    sodium chloride flush  5-40 mL IntraVENous 2 times per day    aspirin  81 mg Oral Daily    [Held by provider] clopidogrel  75 mg Oral Daily      Infusions:    dextrose      sodium chloride 10 mL/hr at 03/03/23 1404     PRN Meds: HYDROmorphone, 0.25 mg, Q3H PRN  HYDROcodone-acetaminophen, 1 tablet, Q6H PRN  ondansetron, 4 mg, Q6H PRN  ipratropium-albuterol, 1 ampule, Q4H PRN  albuterol, 2.5 mg, Q2H PRN  heparin flush, 240 Units, PRN  glucose, 4 tablet, PRN  dextrose bolus, 125 mL, PRN   Or  dextrose bolus, 250 mL, PRN  glucagon (rDNA), 1 mg, PRN  dextrose, , Continuous PRN  sodium chloride flush, 10 mL, PRN  sodium chloride, , PRN      Labs      Recent Results (from the past 24 hour(s))   POCT Glucose    Collection Time: 03/04/23 11:22 AM   Result Value Ref Range    POC Glucose 102 (H) 70 - 99 MG/DL   POCT Glucose    Collection Time: 03/04/23  4:25 PM   Result Value Ref Range    POC Glucose 152 (H) 70 - 99 MG/DL   POCT Glucose    Collection Time: 03/04/23  9:15 PM   Result Value Ref Range    POC Glucose 149 (H) 70 - 99 MG/DL        Imaging/Diagnostics Last 24 Hours   XR CHEST PORTABLE    Result Date: 3/3/2023  EXAMINATION: ONE XRAY VIEW OF THE CHEST 3/3/2023 9:06 am COMPARISON: 02/25/2023, 02/23/2023 HISTORY: ORDERING SYSTEM PROVIDED HISTORY: follow up TECHNOLOGIST PROVIDED HISTORY: Reason for exam:->follow up Reason for Exam: follow up FINDINGS: Left chest cardiac conduction device is unchanged. The right central catheter has been intervally removed. The cardiomediastinal silhouette is unchanged. Small bilateral pleural effusions persist.  Bilateral interstitial and alveolar opacities appear unchanged. No discrete pneumothorax. No significant change from prior exam.  Persistent bilateral airspace disease and small bilateral pleural effusions.        Electronically signed by Giana Burns MD on 3/5/2023 at 9:19 AM

## 2023-03-05 NOTE — PLAN OF CARE
Problem: Discharge Planning  Goal: Discharge to home or other facility with appropriate resources  Outcome: Progressing  Flowsheets (Taken 3/4/2023 0800)  Discharge to home or other facility with appropriate resources:   Identify barriers to discharge with patient and caregiver   Arrange for needed discharge resources and transportation as appropriate   Identify discharge learning needs (meds, wound care, etc)   Arrange for interpreters to assist at discharge as needed   Refer to discharge planning if patient needs post-hospital services based on physician order or complex needs related to functional status, cognitive ability or social support system     Problem: Pain  Goal: Verbalizes/displays adequate comfort level or baseline comfort level  Outcome: Progressing     Problem: Chronic Conditions and Co-morbidities  Goal: Patient's chronic conditions and co-morbidity symptoms are monitored and maintained or improved  Outcome: Progressing  Flowsheets (Taken 3/4/2023 0800)  Care Plan - Patient's Chronic Conditions and Co-Morbidity Symptoms are Monitored and Maintained or Improved:   Monitor and assess patient's chronic conditions and comorbid symptoms for stability, deterioration, or improvement   Update acute care plan with appropriate goals if chronic or comorbid symptoms are exacerbated and prevent overall improvement and discharge   Collaborate with multidisciplinary team to address chronic and comorbid conditions and prevent exacerbation or deterioration     Problem: Nutrition Deficit:  Goal: Optimize nutritional status  Outcome: Progressing     Problem: Skin/Tissue Integrity  Goal: Absence of new skin breakdown  Description: 1. Monitor for areas of redness and/or skin breakdown  2. Assess vascular access sites hourly  3. Every 4-6 hours minimum:  Change oxygen saturation probe site  4.   Every 4-6 hours:  If on nasal continuous positive airway pressure, respiratory therapy assess nares and determine need for appliance change or resting period.   Outcome: Progressing     Problem: Safety - Adult  Goal: Free from fall injury  Outcome: Progressing     Problem: ABCDS Injury Assessment  Goal: Absence of physical injury  Outcome: Progressing

## 2023-03-06 VITALS
HEIGHT: 72 IN | DIASTOLIC BLOOD PRESSURE: 57 MMHG | RESPIRATION RATE: 13 BRPM | OXYGEN SATURATION: 93 % | WEIGHT: 232.1 LBS | SYSTOLIC BLOOD PRESSURE: 134 MMHG | HEART RATE: 65 BPM | BODY MASS INDEX: 31.44 KG/M2 | TEMPERATURE: 98.6 F

## 2023-03-06 LAB
EKG ATRIAL RATE: 64 BPM
EKG DIAGNOSIS: NORMAL
EKG P AXIS: 60 DEGREES
EKG P-R INTERVAL: 200 MS
EKG Q-T INTERVAL: 456 MS
EKG QRS DURATION: 122 MS
EKG QTC CALCULATION (BAZETT): 470 MS
EKG R AXIS: 60 DEGREES
EKG T AXIS: 179 DEGREES
EKG VENTRICULAR RATE: 64 BPM
GLUCOSE BLD-MCNC: 106 MG/DL (ref 70–99)
GLUCOSE BLD-MCNC: 97 MG/DL (ref 70–99)

## 2023-03-06 PROCEDURE — 83735 ASSAY OF MAGNESIUM: CPT

## 2023-03-06 PROCEDURE — 99221 1ST HOSP IP/OBS SF/LOW 40: CPT | Performed by: INTERNAL MEDICINE

## 2023-03-06 PROCEDURE — 97530 THERAPEUTIC ACTIVITIES: CPT

## 2023-03-06 PROCEDURE — 6370000000 HC RX 637 (ALT 250 FOR IP): Performed by: SURGERY

## 2023-03-06 PROCEDURE — 99231 SBSQ HOSP IP/OBS SF/LOW 25: CPT | Performed by: NURSE PRACTITIONER

## 2023-03-06 PROCEDURE — 84484 ASSAY OF TROPONIN QUANT: CPT

## 2023-03-06 PROCEDURE — 97112 NEUROMUSCULAR REEDUCATION: CPT

## 2023-03-06 PROCEDURE — 80048 BASIC METABOLIC PNL TOTAL CA: CPT

## 2023-03-06 PROCEDURE — 93010 ELECTROCARDIOGRAM REPORT: CPT | Performed by: INTERNAL MEDICINE

## 2023-03-06 PROCEDURE — 84100 ASSAY OF PHOSPHORUS: CPT

## 2023-03-06 PROCEDURE — 2580000003 HC RX 258: Performed by: SURGERY

## 2023-03-06 PROCEDURE — 93005 ELECTROCARDIOGRAM TRACING: CPT | Performed by: STUDENT IN AN ORGANIZED HEALTH CARE EDUCATION/TRAINING PROGRAM

## 2023-03-06 PROCEDURE — 6370000000 HC RX 637 (ALT 250 FOR IP): Performed by: INTERNAL MEDICINE

## 2023-03-06 PROCEDURE — 82962 GLUCOSE BLOOD TEST: CPT

## 2023-03-06 PROCEDURE — 94761 N-INVAS EAR/PLS OXIMETRY MLT: CPT

## 2023-03-06 PROCEDURE — 6360000002 HC RX W HCPCS: Performed by: SURGERY

## 2023-03-06 PROCEDURE — 94640 AIRWAY INHALATION TREATMENT: CPT

## 2023-03-06 PROCEDURE — 2700000000 HC OXYGEN THERAPY PER DAY

## 2023-03-06 PROCEDURE — 97535 SELF CARE MNGMENT TRAINING: CPT

## 2023-03-06 RX ORDER — METRONIDAZOLE 500 MG/1
500 TABLET ORAL EVERY 8 HOURS SCHEDULED
Qty: 6 TABLET | Refills: 0 | Status: SHIPPED | OUTPATIENT
Start: 2023-03-06 | End: 2023-03-08

## 2023-03-06 RX ORDER — METOPROLOL SUCCINATE 25 MG/1
12.5 TABLET, EXTENDED RELEASE ORAL DAILY
Status: DISCONTINUED | OUTPATIENT
Start: 2023-03-06 | End: 2023-03-07 | Stop reason: HOSPADM

## 2023-03-06 RX ORDER — MAGNESIUM SULFATE 1 G/100ML
1000 INJECTION INTRAVENOUS ONCE
Status: DISCONTINUED | OUTPATIENT
Start: 2023-03-06 | End: 2023-03-07 | Stop reason: HOSPADM

## 2023-03-06 RX ORDER — METRONIDAZOLE 500 MG/1
500 TABLET ORAL EVERY 8 HOURS SCHEDULED
Qty: 12 TABLET | Refills: 0 | Status: SHIPPED | OUTPATIENT
Start: 2023-03-06 | End: 2023-03-06 | Stop reason: SDUPTHER

## 2023-03-06 RX ORDER — METOPROLOL SUCCINATE 25 MG/1
12.5 TABLET, EXTENDED RELEASE ORAL DAILY
Qty: 30 TABLET | Refills: 3 | Status: SHIPPED | OUTPATIENT
Start: 2023-03-06

## 2023-03-06 RX ORDER — LEVOFLOXACIN 750 MG/1
750 TABLET ORAL EVERY OTHER DAY
Qty: 3 TABLET | Refills: 0 | Status: SHIPPED | OUTPATIENT
Start: 2023-03-07 | End: 2023-03-06 | Stop reason: SDUPTHER

## 2023-03-06 RX ORDER — LEVOFLOXACIN 500 MG/1
500 TABLET, FILM COATED ORAL
Qty: 5 TABLET | Refills: 0 | Status: SHIPPED | OUTPATIENT
Start: 2023-03-06 | End: 2023-03-13

## 2023-03-06 RX ORDER — CLOPIDOGREL BISULFATE 75 MG/1
75 TABLET ORAL DAILY
Qty: 30 TABLET | Refills: 3 | Status: SHIPPED | OUTPATIENT
Start: 2023-03-06

## 2023-03-06 RX ORDER — OXYCODONE HYDROCHLORIDE AND ACETAMINOPHEN 5; 325 MG/1; MG/1
TABLET ORAL
Qty: 10 TABLET | Refills: 0 | Status: SHIPPED | OUTPATIENT
Start: 2023-03-06 | End: 2023-03-14

## 2023-03-06 RX ADMIN — TORSEMIDE 40 MG: 20 TABLET ORAL at 10:42

## 2023-03-06 RX ADMIN — HEPARIN SODIUM 5000 UNITS: 5000 INJECTION INTRAVENOUS; SUBCUTANEOUS at 15:22

## 2023-03-06 RX ADMIN — METRONIDAZOLE 500 MG: 250 TABLET ORAL at 06:44

## 2023-03-06 RX ADMIN — AMLODIPINE BESYLATE 10 MG: 10 TABLET ORAL at 10:42

## 2023-03-06 RX ADMIN — ISOSORBIDE MONONITRATE 30 MG: 30 TABLET, EXTENDED RELEASE ORAL at 10:42

## 2023-03-06 RX ADMIN — FAMOTIDINE 20 MG: 20 TABLET ORAL at 10:42

## 2023-03-06 RX ADMIN — HEPARIN SODIUM 5000 UNITS: 5000 INJECTION INTRAVENOUS; SUBCUTANEOUS at 06:43

## 2023-03-06 RX ADMIN — TIOTROPIUM BROMIDE INHALATION SPRAY 2 PUFF: 3.12 SPRAY, METERED RESPIRATORY (INHALATION) at 07:23

## 2023-03-06 RX ADMIN — HYDROCODONE BITARTRATE AND ACETAMINOPHEN 1 TABLET: 7.5; 325 TABLET ORAL at 00:23

## 2023-03-06 RX ADMIN — EMPAGLIFLOZIN 10 MG: 10 TABLET, FILM COATED ORAL at 10:42

## 2023-03-06 RX ADMIN — METRONIDAZOLE 500 MG: 250 TABLET ORAL at 15:22

## 2023-03-06 RX ADMIN — SODIUM CHLORIDE, PRESERVATIVE FREE 10 ML: 5 INJECTION INTRAVENOUS at 10:41

## 2023-03-06 RX ADMIN — MAGNESIUM OXIDE 400 MG (241.3 MG MAGNESIUM) TABLET 400 MG: TABLET at 10:42

## 2023-03-06 RX ADMIN — ASPIRIN 81 MG CHEWABLE TABLET 81 MG: 81 TABLET CHEWABLE at 10:42

## 2023-03-06 ASSESSMENT — PAIN SCALES - GENERAL: PAINLEVEL_OUTOF10: 7

## 2023-03-06 NOTE — PROGRESS NOTES
V2.0  [facility] Hospitalist Progress Note      Name:  Shikha Noriega /Age/Sex: 1950  (67 y.o. male)   MRN & CSN:  [id number] & [id number] Encounter Date/Time: 3/6/2023 12:10 PM EST    Location:  -A PCP: Meenu Moreland Day: 24    Assessment and Plan:   Shikha Noriega is a 67 y.o. male with pmh of CAD, HFpEF, ICD, CKD, DM who presents with Acute respiratory failure with hypoxia and hypercapnia ([facility] [address]      Plan:  Acute hypoxic hypercapnic respiratory failure with pneumonia:  -Initially placed on BiPAP but later intubated  and successfully extubated   -Currently weaned down to 2 L/min, will continue to try to wean down  -We will order home O2 eval prior to discharge  Large pleural effusion:  -Chest tube inserted , removed on 2023, chest x-ray 3/3 with small bilateral pleural effusions  Cardiopulmonary arrest due to difficulty in ventilation  Acute renal failure with history of CKD stage IV:  -On hemodialysis initially for few days, off since 2/15/2023  -Strict intake and output record, daily weight  -Avoid nephrotoxic medications  -Adjust medications to patient's creatinine clearance  -Nephrology on board, plan for s/p AVG 3/3, creatinine stabilizing per last reading,   Right upper extremity DVT:  -Seen on vein mapping done on 2023  -Heme-onc consulted, no indication for [facility] as per Hem  Chronic anemia:  -Baseline hemoglobin 8-9  -Likely due to anemia of chronic disease  -Heme-onc consult        -Hb 7.4 today, Iro Panel w Ferritin 100, Iron 51, Tsat 31%, ordered for Venofer 200 mg IV and will receive procrit per Nephro.    Hyperkalemia-resolved  Elevated troponin:  -Likely considered type II in the setting of respiratory failure as well as kidney failure, cardiology on board, patient does have history of CAD with multiple stents  HFpEF:  -EF 50-55% in , repeat echo with similar EF, hypokinetic RV with bowing of interventricular septum towards LV, torsemide PO Chronic lower extremity wounds:  -Wound care on board, likely infected, purulent discharge from the right lower extremity first metatarsal which is foul-smelling. General surgery on consult, wound care culture sent, growing Pseudomonas aeruginosa and Citrobacter, MRSA, Enterococcus faecalis, bacteroids. ID on board, completed Zyvox and levofloxacin, currently on metronidazole per ID till 3/8  Class II obesity:  -BMI 35  Class II diabetes mellitus:  -Monitor off insulin, could benefit from oral medications at discharge, but very of anything causes hypoglycemia  History of urethral stricture:  -With urinary retention, status post Fuller catheter insertion, Fuller catheter removed on 3/1/2023 last reported to be voiding well will discuss with staff to update urine output for today  Hypomagnesemia:        -Mag 1.5 Today, ordered for repletion        -Will replete to keep Mg>2    Diet ADULT [address]; Lunch; Wound Healing Oral Supplement  ADULT DIET; Dysphagia - Soft and Bite Sized; 4 carb choices (60 gm/meal)   DVT Prophylaxis [] Lovenox, [x]  Heparin, [] SCDs, [] Ambulation,  [] Eliquis, [] Xarelto  [] Coumadin   Code Status Full Code   Disposition From: Home  Expected Disposition: TBD  Estimated Date of Discharge: 1-2 days  Patient requires continued admission due to monitoring RUE for swelling   Surrogate Decision Maker/ POA      Subjective:     Chief Complaint: Respiratory Distress       Carmelita Shin is a 67 y.o. male who presents with aspiratory distress. 3/3:Patient reports improvement in shortness of breath, will try to wean oxygen and perform home O2 eval.  Patient due for AV graft today  3/4/23:Pt seen & examined, VSS, no recrd of urine output, will d/w staff and order bladder scan, will try to wean O2, currently on 2LPM, Hb 7.4, no symptoms of anemia, d/w nephro, Iron panel w Fe 50/ Ferritin 100, Tsat 30%, will give Venofer/ Procrit due to risk of fluid overload  and no CAD symptoms. Mag 1.5 , ordered for repletion. 3/5/23: Patient seen and examined, vital signs stable, Hb 8.1 s/p Venoferx1 and Procrit x1 dose  3/6/23:Pt seen & examined at bedside, VS stable, on 2LPM baseline, RUE swelling reported overnight s per charts, surgery evaluated patient and recommended arm elevation and ACE bandage, no need for US, and to monitor for pain/ coldness of RUE or difficulty using RUE. ID     Review of Systems:    Review of Systems    Remarkable except as above    Objective: Intake/Output Summary (Last 24 hours) at 3/6/2023 6782  Last data filed at 3/5/2023 2351  Gross per 24 hour   Intake --   Output 400 ml   Net -400 ml          Vitals:   Vitals:    03/06/23 0700   BP: 132/61   Pulse: 63   Resp: 10   Temp:    SpO2: 95%       Physical Exam:     General: NAD  Eyes: EOMI  ENT: neck supple  Cardiovascular: Regular rate. Respiratory: Clear to auscultation  Gastrointestinal: Soft, non tender  Genitourinary: no suprapubic tenderness  Musculoskeletal: BiLateral lower extremity bandaged , no edema  Skin: warm, dry  Neuro: Alert. Psych: Mood appropriate.      Medications:   Medications:    levoFLOXacin  750 mg Oral Every Other Day    empagliflozin  10 mg Oral Daily    isosorbide mononitrate  30 mg Oral Daily    magnesium oxide  400 mg Oral BID    atorvastatin  40 mg Oral Nightly    sodium hypochlorite   Topical Daily    tiotropium  2 puff Inhalation Daily    amLODIPine  10 mg Oral Daily    torsemide  40 mg Oral Daily    metroNIDAZOLE  500 mg Oral 3 times per day    silver sulfADIAZINE   Topical Daily    famotidine  20 mg Per NG tube Daily    heparin (porcine)  5,000 Units SubCUTAneous 3 times per day    sodium hypochlorite   Irrigation Daily    collagenase   Topical Daily    sodium chloride flush  5-40 mL IntraVENous 2 times per day    aspirin  81 mg Oral Daily    [Held by provider] clopidogrel  75 mg Oral Daily      Infusions:    dextrose      sodium chloride 10 mL/hr at 03/03/23 1404     PRN Meds: HYDROmorphone, 0.25 mg, Q3H PRN  HYDROcodone-acetaminophen, 1 tablet, Q6H PRN  ondansetron, 4 mg, Q6H PRN  ipratropium-albuterol, 1 ampule, Q4H PRN  albuterol, 2.5 mg, Q2H PRN  heparin flush, 240 Units, PRN  glucose, 4 tablet, PRN  dextrose bolus, 125 mL, PRN   Or  dextrose bolus, 250 mL, PRN  glucagon (rDNA), 1 mg, PRN  dextrose, , Continuous PRN  sodium chloride flush, 10 mL, PRN  sodium chloride, , PRN      Labs      Recent Results (from the past 24 hour(s))   Comprehensive Metabolic Panel    Collection Time: 03/05/23 12:25 PM   Result Value Ref Range    Sodium 140 135 - 145 MMOL/L    Potassium 4.0 3.5 - 5.1 MMOL/L    Chloride 99 99 - 110 mMol/L    CO2 31 21 - 32 MMOL/L    BUN 48 (H) 6 - 23 MG/DL    Creatinine 4.0 (H) 0.9 - 1.3 MG/DL    Est, Glom Filt Rate 15 (L) >60 mL/min/1.73m2    Glucose 135 (H) 70 - 99 MG/DL    Calcium 7.7 (L) 8.3 - 10.6 MG/DL    Albumin 2.8 (L) 3.4 - 5.0 GM/DL    Total Protein 5.9 (L) 6.4 - 8.2 GM/DL    Total Bilirubin 0.4 0.0 - 1.0 MG/DL    ALT <5 (L) 10 - 40 U/L    AST 13 (L) 15 - 37 IU/L    Alkaline Phosphatase 61 40 - 128 IU/L    Anion Gap 10 4 - 16   CBC with Auto Differential    Collection Time: 03/05/23 12:25 PM   Result Value Ref Range    WBC 6.7 4.0 - 10.5 K/CU MM    RBC 3.04 (L) 4.6 - 6.2 M/CU MM    Hemoglobin 8.1 (L) 13.5 - 18.0 GM/DL    Hematocrit 28.5 (L) 42 - 52 %    MCV 93.8 78 - 100 FL    MCH 26.6 (L) 27 - 31 PG    MCHC 28.4 (L) 32.0 - 36.0 %    RDW 20.3 (H) 11.7 - 14.9 %    Platelets 048 403 - 694 K/CU MM    MPV 10.0 7.5 - 11.1 FL    Differential Type AUTOMATED DIFFERENTIAL     Segs Relative 67.7 (H) 36 - 66 %    Lymphocytes % 16.2 (L) 24 - 44 %    Monocytes % 11.8 (H) 0 - 4 %    Eosinophils % 3.4 (H) 0 - 3 %    Basophils % 0.6 0 - 1 %    Segs Absolute 4.5 K/CU MM    Lymphocytes Absolute 1.1 K/CU MM    Monocytes Absolute 0.8 K/CU MM    Eosinophils Absolute 0.2 K/CU MM    Basophils Absolute 0.0 K/CU MM    Nucleated RBC % 0.0 %    Total Nucleated RBC 0.0 K/CU MM    Total Immature Neutrophil 0.02 K/CU MM    Immature Neutrophil % 0.3 0 - 0.43 %   Magnesium    Collection Time: 03/05/23 12:25 PM   Result Value Ref Range    Magnesium 2.2 1.8 - 2.4 mg/dl   Phosphorus    Collection Time: 03/05/23 12:25 PM   Result Value Ref Range    Phosphorus 2.8 2.5 - 4.9 MG/DL   POCT Glucose    Collection Time: 03/05/23  4:15 PM   Result Value Ref Range    POC Glucose 132 (H) 70 - 99 MG/DL   POCT Glucose    Collection Time: 03/06/23  7:55 AM   Result Value Ref Range    POC Glucose 97 70 - 99 MG/DL        Imaging/Diagnostics Last 24 Hours   XR CHEST PORTABLE    Result Date: 3/3/2023  EXAMINATION: ONE XRAY VIEW OF THE CHEST 3/3/2023 9:06 am COMPARISON: 02/25/2023, 02/23/2023 HISTORY: ORDERING SYSTEM PROVIDED HISTORY: follow up TECHNOLOGIST PROVIDED HISTORY: Reason for exam:->follow up Reason for Exam: follow up FINDINGS: Left chest cardiac conduction device is unchanged. The right central catheter has been intervally removed. The cardiomediastinal silhouette is unchanged. Small bilateral pleural effusions persist.  Bilateral interstitial and alveolar opacities appear unchanged. No discrete pneumothorax. No significant change from prior exam.  Persistent bilateral airspace disease and small bilateral pleural effusions.        Electronically signed by Kandy Choudhary MD on 3/6/2023 at 8:22 AM

## 2023-03-06 NOTE — DISCHARGE SUMMARY
V2.0  Discharge Summary    Name:  Preston Galdamez /Age/Sex: 1950 (67 y.o. male)   Admit Date: 2023  Discharge Date: 3/6/23    MRN & CSN:  [id number] & [id number] Encounter Date and Time 3/6/23 4:31 PM EST    Attending:  Cristal Rosado MD Discharging Provider: Cristal Rosado MD       Hospital Course:     Brief HPI: Preston Galdamez is a 67 y.o. male with pmh of CAD, HFpEF, ICD, CKD, DM who presents with Acute respiratory failure with hypoxia and hypercapnia. Initially placed on BiPAP but later intubated  and successfully extubated . Currently weaned down to 2 L/min which is patient's BL O2 requirement. AHRF was in setting of Large pleural effusion w Chest tube inserted , removed on 2023, chest x-ray 3/3 with small bilateral pleural effusions. Effusions were thought to be iso fluid overload in background of renal failure. Hospital course was complicated by cardiac arrest due to difficulty in ventilation. Nephrology on board, plan for s/p AVG 3/3, creatinine stabilizing per last reading around 3.8-4. Pt experienced acute on chronic anemia likeyly s/p procedure s/p IV Iron x1 and Procrit x2 w f/u Hb 8.1 before dischagre. Superficial thrombus in R-cephalic and Basilic veins s/p HemOnc eval , likely catheter related w no recommendation for [facility] as per Hematologist. Electrolyte abnormalities improved s/p repletion. Lower extremity wounds w Cx growing PSEUDOMONAS AERUGINOSA Moderate growth, CITROBACTER FREUNDII Light growth, STAPH AUREUS MRSA Moderate growth,  ENTEROCOCCUS FAECALIS Light growth Abnormal  BACTEROIDES THETAIOTAOMICRON Moderate growth Beta Lactamase POSITIVE pt s/p Linezolid, currently on Metronidazole PO ( end date 3/8) and  Levofloxacin adjusted for renal function per pharmacy per ID recs. Pt having some PVC's today on tele, denies any symptoms, 12-lead EKGw atrial paced rhythm, case d/w cardiologist Dr Rosendo Romberg and assessed pt together at bedside.  Pt to be discharged with outpatient follow up    Brief Problem Based Course:   Acute hypoxic hypercapnic respiratory failure with pneumonia  Large pleural effusion, iso NANCY, s/p thoracentesis x2, currently on torsemide PO BiD  Acute renal failure with history of CKD stage IV-s/p HD, last session 2/14. Currently having AV graft and to be assessed as outpatient by nephrologist  JANNETH superficial veinthrombosis- s/p Hem eval, no indication for Abx as per Hem  Chronic anemia- Hb on discharge 8.1  Hyperkalemia-resolved  Elevated troponin-probably demand related iso Type 2, no Chest Pain  HFpEF:        -EF 50-55% in 2022, repeat echo with similar EF, hypokinetic RV with bowing of interventricular septum towards LV, torsemide PO   9. Chronic lower extremity wounds- Wound care as outpatient/ Abx per ID  10. Class II obesity:  11.History of urethral stricture- s/p TOV, with bladder scans ~90ml prior to discharge   12. Hypomagnesemia-repleted, will send on oral repletion        The patient expressed appropriate understanding of, and agreement with the discharge recommendations, medications, and plan.      Consults this admission:  IP CONSULT TO NEPHROLOGY  IP CONSULT TO CARDIOLOGY  IP CONSULT TO UROLOGY  IP CONSULT TO DIETITIAN  IP CONSULT TO GENERAL SURGERY  IP CONSULT TO INFECTIOUS DISEASES  IP CONSULT TO INTERVENTIONAL RADIOLOGY  IP CONSULT TO HOME CARE NEEDS  IP CONSULT TO GENERAL SURGERY  IP CONSULT TO HEMATOLOGY  IP CONSULT TO IV TEAM    Discharge Diagnosis:   Acute respiratory failure with hypoxia and hypercapnia [facility])      Discharge Instruction:   Follow up appointments: PCP/ Nephrologist/ Hematologist/ Wound Care/  Primary care physician: Livan Workman within 2 weeks  Diet: cardiac diet   Activity: activity as tolerated  Disposition: Discharged to:   [x]Home, []C, []SNF, []Acute Rehab, []Hospice   Condition on discharge: Stable  Labs and Tests to be Followed up as an outpatient by PCP or Specialist: CBC/ CMP/ Mag/ Phos as outpatient in 1 week    Discharge Medications:        Medication List        START taking these medications      collagenase 250 UNIT/GM ointment  Apply topically daily. levoFLOXacin 500 MG tablet  Commonly known as: LEVAQUIN  Take 1 tablet by mouth every 48 hours for 7 days     metroNIDAZOLE 500 MG tablet  Commonly known as: FLAGYL  Take 1 tablet by mouth every 8 hours for 2 days     oxyCODONE-acetaminophen 5-325 MG per tablet  Commonly known as: Percocet  Take 1 tablet by mouth every 6 hours as needed for Pain for 1 day, THEN 1 tablet every 8 hours as needed for Pain for 1 day, THEN 1 tablet every 12 hours as needed for Pain for 1 day, THEN 1 tablet daily as needed for Pain for up to 1 day. Max Daily Amount: 4 tablets. Start taking on: March 6, 2023  Replaces: oxyCODONE-acetaminophen 7.5-325 MG per tablet            CHANGE how you take these medications      Torsemide 40 MG Tabs  Take 40 mg by mouth daily  What changed:   medication strength  See the new instructions.             CONTINUE taking these medications      albuterol sulfate  (90 Base) MCG/ACT inhaler  Commonly known as: Ventolin HFA  Inhale 2 puffs into the lungs every 4 hours as needed for Wheezing     amLODIPine 10 MG tablet  Commonly known as: NORVASC  Take 1 tablet by mouth daily     aspirin 81 MG chewable tablet  Take 1 tablet by mouth daily     clopidogrel 75 MG tablet  Commonly known as: PLAVIX  Take 1 tablet by mouth daily     magnesium oxide 400 (240 Mg) MG tablet  Commonly known as: MAG-OX  Take 1 tablet by mouth 2 times daily     sodium hypochlorite 0.125 % Soln external solution  Commonly known as: DAKINS  Apply topically daily            STOP taking these medications      carvedilol 25 MG tablet  Commonly known as: COREG     gabapentin 300 MG capsule  Commonly known as: NEURONTIN     gentamicin 0.1 % cream  Commonly known as: GARAMYCIN     metOLazone 2.5 MG tablet  Commonly known as: ZAROXOLYN     oxyCODONE-acetaminophen 7.5-325 MG per tablet  Commonly known as: Percocet  Replaced by: oxyCODONE-acetaminophen 5-325 MG per tablet     sulfamethoxazole-trimethoprim 400-80 MG per tablet  Commonly known as: Bactrim            ASK your doctor about these medications      atorvastatin 40 MG tablet  Commonly known as: LIPITOR  Take 1 tablet by mouth nightly     empagliflozin 10 MG tablet  Commonly known as: JARDIANCE  Take 1 tablet by mouth daily     isosorbide mononitrate 30 MG extended release tablet  Commonly known as: IMDUR  Take 1 tablet by mouth daily     Spiriva HandiHaler 18 MCG inhalation capsule  Generic drug: tiotropium               Where to Get Your Medications        These medications were sent to [address] [place] [address]. - P [phone] - F [phone]  [address] [place] [address]      Phone: [phone]   clopidogrel 75 MG tablet  collagenase 250 UNIT/GM ointment  levoFLOXacin 500 MG tablet  metroNIDAZOLE 500 MG tablet  Torsemide 40 MG Tabs       You can get these medications from any pharmacy    Bring a paper prescription for each of these medications  oxyCODONE-acetaminophen 5-325 MG per tablet        Objective Findings at Discharge:   BP (!) 134/57   Pulse 65   Temp 98.6 °F (37 °C)   Resp 13   Ht 6' (1.829 m)   Wt 232 lb 1.6 oz (105.3 kg)   SpO2 93%   BMI 31.48 kg/m²       Physical Exam:   General: NAD  Eyes: EOMI  ENT: neck supple  Cardiovascular: Regular rate. Respiratory: Clear to auscultation  Gastrointestinal: Soft, non tender  Genitourinary: no suprapubic tenderness  Musculoskeletal: lower extremity wounds covered with bandage  Skin: warm, dry  Neuro: Alert. Psych: Mood appropriate.          Labs and Imaging   XR CHEST PORTABLE    Result Date: 3/3/2023  EXAMINATION: ONE XRAY VIEW OF THE CHEST 3/3/2023 9:06 am COMPARISON: 02/25/2023, 02/23/2023 HISTORY: ORDERING SYSTEM PROVIDED HISTORY: follow up TECHNOLOGIST PROVIDED HISTORY: Reason for exam:->follow up Reason for Exam: follow up FINDINGS: Left chest cardiac conduction device is unchanged. The right central catheter has been intervally removed. The cardiomediastinal silhouette is unchanged. Small bilateral pleural effusions persist.  Bilateral interstitial and alveolar opacities appear unchanged. No discrete pneumothorax. No significant change from prior exam.  Persistent bilateral airspace disease and small bilateral pleural effusions. CBC:   Recent Labs     03/04/23 0427 03/05/23  1225   WBC 7.5 6.7   HGB 7.4* 8.1*    170     BMP:    Recent Labs     03/04/23 0427 03/05/23  1225    140   K 3.8 4.0    99   CO2 33* 31   BUN 48* 48*   CREATININE 3.8* 4.0*   GLUCOSE 119* 135*     Hepatic:   Recent Labs     03/04/23 0427 03/05/23  1225   AST 13* 13*   ALT <5* <5*   BILITOT 0.5 0.4   ALKPHOS 51 61     Lipids:   Lab Results   Component Value Date/Time    CHOL 79 12/11/2020 07:00 AM    HDL 30 12/11/2020 07:00 AM    TRIG 61 12/11/2020 07:00 AM     Hemoglobin A1C:   Lab Results   Component Value Date/Time    LABA1C 7.1 10/12/2022 06:28 AM     TSH: No results found for: TSH  Troponin:   Lab Results   Component Value Date/Time    TROPONINT 0.107 02/11/2023 10:47 AM    TROPONINT 0.063 11/11/2022 12:50 AM    TROPONINT 0.074 10/12/2022 01:21 PM     Lactic Acid: No results for input(s): LACTA in the last 72 hours. BNP: No results for input(s): PROBNP in the last 72 hours.   UA:  Lab Results   Component Value Date/Time    NITRU NEGATIVE 02/23/2023 10:15 AM    COLORU YELLOW 02/23/2023 10:15 AM    PHUR 5.0 08/19/2016 09:38 AM    WBCUA 4 02/23/2023 10:15 AM    RBCUA 39 02/23/2023 10:15 AM    MUCUS RARE 02/23/2023 10:15 AM    TRICHOMONAS NONE SEEN 02/23/2023 10:15 AM    BACTERIA NEGATIVE 02/23/2023 10:15 AM    CLARITYU SLIGHTLY CLOUDY 02/23/2023 10:15 AM    SPECGRAV 1.020 02/23/2023 10:15 AM    LEUKOCYTESUR NEGATIVE 02/23/2023 10:15 AM    UROBILINOGEN 0.2 02/23/2023 10:15 AM    BILIRUBINUR NEGATIVE 02/23/2023 10:15 AM    BLOODU LARGE NUMBER OR AMOUNT OBSERVED 02/23/2023 10:15 AM    KETUA TRACE 02/23/2023 10:15 AM     Urine Cultures: No results found for: LABURIN  Blood Cultures: No results found for: BC  No results found for: BLOODCULT2  Organism: No results found for: ORG    Time Spent Discharging patient 35 minutes    Electronically signed by Salvador Belle MD on 3/6/2023 at 4:31 PM

## 2023-03-06 NOTE — PROGRESS NOTES
Patient is arm is feeling much better. He wants to go home and is upset that he cannot go home today. Examination of his arm does show some edema from venous hypertension. Improved with Ace compression. The patient does have good distal flow a warm hand with normal strength and also a thrill within the graft  I discussed with the hospitalist taking care of the patient that the patient could go home with home health care. I will write to be seen in the office next week.   Daily wrapping of the arm with Ace bandage recommended at home with home health care

## 2023-03-06 NOTE — PROGRESS NOTES
Generalized edema/pain noted around/proximal to new AV fistula site encompassing entire extremity. Minimal radial pulse, no warmth noted, pt is able to open/close fingers and move arm. Extremity elevated and APN contacted, awaiting eval at bedside.    Roro Gracia RN

## 2023-03-06 NOTE — PROGRESS NOTES
Infectious Disease Progress Note  3/6/2023   Patient Name: Dusty Santos : 1950   Impression  Septic Shock (Resolved) Secondary to Multi-Factioral:  Nonhealing Right Foot Wound with Polymicrobial Infection with Probable OM:  Multi-Focal Pneumonia Complicated by Acute Hypoxic Respiratory Failure (Intubated/ Mech Vent, Extubated 23): Allergy to PCN (Hives): Tolerates cephalosporins and carbapenems   Right Upper Extremity DVT:  Afebrile, no leukocytosis  CrCl 21  Pct and CRP Pending  Hypoalbuminemia:  -Right hallux culture: Pseudomonas aeruginosa, Citrobacter freundii, MRSA, E.faecalis, and Bacteroides thetaiotaomicron  3/3-s/p per Dr. Qureshi Coffee: Placement of right upper arm Xenograft from axillary vein to brachial artery  Dr. David Coronado onboard for DVT mgt, imp of likely catheter related. Does not rec AC. CKD3:  Dr. Bridget Goff onboard  BLE Chronic Wounds Secondary to PAD:  DMII:  CIED Placement 2010:  CAD s/p PCI/ HTN/ HFpEF/ Cardiomyopathy:  Dr. Haydee Crook onboard  STACI:  Morbid Obesity:  BMI: 35.82  Multi-morbidity: per PMHx:  GERD, arthritis, past CVA  Plan:  Continue po Levaquin 750 mg q24h (covering P.aeruginosa and Citrobacter) x 14 days cumulative (end date 3/13/2023)  Completed linezolid on 3/2 as planned  Continue po metronidazole 500 mg tid (covering Bacteroides) x 14 days (end date 3/8/2023)  Trend CRP and Pct, new labs ordered  Will follow, if CRP and Pct have elevated, will possibly change therapy    Ongoing Antimicrobial Therapy  Levofloxacin -, 3/5-  Flagyl -? Completed Antimicrobial Therapy  Cefepime -  Azithromycin -? Ceftriaxone -? Linezolid -3/2  History:? Interval history noted. Chief complaint: Resolved septic shock. Right foot wound with polymicrobial infection.   Denies n/v/d/f or untoward effects of antibiotics  Physical Exam:  Vital Signs: /61   Pulse 63   Temp 98.2 °F (36.8 °C) (Oral)   Resp 10   Ht 6' (1.829 m)   Wt 232 lb 1.6 oz (105.3 kg)   SpO2 95%   BMI 31.48 kg/m²     Gen: disoriented, but replies to yes/no questions, no distress   Skin: no stigmata of endocarditis  Wounds: C/D/I Bilateral feet with no surrounding erythema or edema  HEMT: AT/NC Oropharynx pink, moist, and without lesions or exudates; dentition in good state of repair  Eyes: PERRLA, EOMI, conjunctiva pink, sclera anicteric. Neck: Supple. Trachea midline. No LAD. Chest: no distress and CTA. Good air movement. Heart: NSR and no MRG. Abd: soft, non-distended, no tenderness, no hepatomegaly. Normoactive bowel sounds. Ext: no clubbing, cyanosis, or edema  Catheter Site: without erythema or tenderness  Neuro: CN 2-12 intact and no focal sensory or motor deficits     Radiologic / Imaging / TESTING  2/11/2023 XR Chest Portable;  Impression   Findings consistent with congestive heart failure and/or bilateral   mid-basilar pneumonia. Small bibasilar pleural effusions suggested. Findings are generally worse on the right.      2/11/2023 XR Chest Portable;  Impression   Lines and tubes as above. The ETT terminates at the level of the superior   margin of the clavicles. Grossly unchanged pulmonary exam.  Findings favor cardiogenic pulmonary   edema. However a superimposed infectious process cannot be excluded. 2/13/2023 CT head WO Contrast:  Impression   No acute intracranial abnormality. 2/13/2023 CT Chest Abdomen Pelvis WO Contrast:  Impression   1. Moderate right pleural effusion with severe atelectasis in the right lung. 2. Small left pleural effusion with moderate left lower lobe atelectasis. 3. Moderate amount of abdominal and pelvic ascites. 4. Otherwise no acute abnormality in the abdomen or pelvis. Incidental   cholelithiasis.       2/16/2023 XR Chest 1 View:  Impression   Mild improved moderate-to-large right pleural effusion with right basilar   consolidation compared to prior CT on 02/13/2023.      2/16/2023 XR Chest Portable:  Impression Mildly improved moderate right pleural effusion and right mid to lower lobe   consolidation. Unremarkable appearing endotracheal tube. 2/16/2023 XR Chest Portable:  Impression   1. Endotracheal tube tip projects 4.5 cm from the chayo. 2. Cardiomegaly with central pulmonary vascular congestion and layering right   pleural effusion. 2/17/2023 XR Chest Portable:  Impression   1. No significant change life support appliances. 2. Improved aeration right lower lobe. 2/19/2023 XR Chest Portable:  Impression   1. Stable lines, tubes and support devices. 2. Worsening right base opacity. 3. Small left pleural effusion. 2/22/2023 XR Chest 1 View:  Impression   1. Questionable trace right apical pneumothorax. 2. Pulmonary vascular congestion with suspected central predominant edema,   suggesting congestive heart failure given mild cardiomegaly and suspected   trace bilateral pleural effusions. Pneumonia could appear similar. 3. Persistent bibasilar airspace opacities appearing unchanged on the left   and decreased on the right likely due in part to passive atelectasis with   superimposed pneumonia and aspiration not excluded. 2/22/2023 XR Chest 1 View:  Impression   Similar-appearing prominence the cardiac silhouette with persistent vascular   congestion and bibasilar opacities. 2/24/2023 VL Dup Upper Extremity Venous Left:  Impression   No evidence of DVT. 2/25/2023 XR Chest Portable:  Impression   No significant change from prior exam.  Persistent pulmonary edema. 2/27/2023 VL Vessel Mapping Prior to hemodialysis Access:  Impression   Partially occlusive thrombus right internal jugular vein without significant   reduction luminal caliber, partial occlusive thrombus right cephalic vein   with significant/near complete loss of lumen, partially occlusive thrombus   right basilic vein with less than 50% loss of luminal diameter.        Basilic and cephalic luminal diameters and depth from skin surface as   described above. 3/3/2023 XR Chest Portable:  Impression   No significant change from prior exam.  Persistent bilateral airspace disease   and small bilateral pleural effusions.        Labs:    Recent Results (from the past 24 hour(s))   Comprehensive Metabolic Panel    Collection Time: 03/05/23 12:25 PM   Result Value Ref Range    Sodium 140 135 - 145 MMOL/L    Potassium 4.0 3.5 - 5.1 MMOL/L    Chloride 99 99 - 110 mMol/L    CO2 31 21 - 32 MMOL/L    BUN 48 (H) 6 - 23 MG/DL    Creatinine 4.0 (H) 0.9 - 1.3 MG/DL    Est, Glom Filt Rate 15 (L) >60 mL/min/1.73m2    Glucose 135 (H) 70 - 99 MG/DL    Calcium 7.7 (L) 8.3 - 10.6 MG/DL    Albumin 2.8 (L) 3.4 - 5.0 GM/DL    Total Protein 5.9 (L) 6.4 - 8.2 GM/DL    Total Bilirubin 0.4 0.0 - 1.0 MG/DL    ALT <5 (L) 10 - 40 U/L    AST 13 (L) 15 - 37 IU/L    Alkaline Phosphatase 61 40 - 128 IU/L    Anion Gap 10 4 - 16   CBC with Auto Differential    Collection Time: 03/05/23 12:25 PM   Result Value Ref Range    WBC 6.7 4.0 - 10.5 K/CU MM    RBC 3.04 (L) 4.6 - 6.2 M/CU MM    Hemoglobin 8.1 (L) 13.5 - 18.0 GM/DL    Hematocrit 28.5 (L) 42 - 52 %    MCV 93.8 78 - 100 FL    MCH 26.6 (L) 27 - 31 PG    MCHC 28.4 (L) 32.0 - 36.0 %    RDW 20.3 (H) 11.7 - 14.9 %    Platelets 287 332 - 513 K/CU MM    MPV 10.0 7.5 - 11.1 FL    Differential Type AUTOMATED DIFFERENTIAL     Segs Relative 67.7 (H) 36 - 66 %    Lymphocytes % 16.2 (L) 24 - 44 %    Monocytes % 11.8 (H) 0 - 4 %    Eosinophils % 3.4 (H) 0 - 3 %    Basophils % 0.6 0 - 1 %    Segs Absolute 4.5 K/CU MM    Lymphocytes Absolute 1.1 K/CU MM    Monocytes Absolute 0.8 K/CU MM    Eosinophils Absolute 0.2 K/CU MM    Basophils Absolute 0.0 K/CU MM    Nucleated RBC % 0.0 %    Total Nucleated RBC 0.0 K/CU MM    Total Immature Neutrophil 0.02 K/CU MM    Immature Neutrophil % 0.3 0 - 0.43 %   Magnesium    Collection Time: 03/05/23 12:25 PM   Result Value Ref Range    Magnesium 2.2 1.8 - 2.4 mg/dl Phosphorus    Collection Time: 03/05/23 12:25 PM   Result Value Ref Range    Phosphorus 2.8 2.5 - 4.9 MG/DL   POCT Glucose    Collection Time: 03/05/23  4:15 PM   Result Value Ref Range    POC Glucose 132 (H) 70 - 99 MG/DL   POCT Glucose    Collection Time: 03/06/23  7:55 AM   Result Value Ref Range    POC Glucose 97 70 - 99 MG/DL     CULTURE results: Invalid input(s): BLOOD CULTURE,  URINE CULTURE, SURGICAL CULTURE    Diagnosis:  Patient Active Problem List   Diagnosis    PAD (peripheral artery disease) ([facility])    Chronic coronary artery disease    Biventricular ICD (implantable cardioverter-defibrillator) in place    Chronic combined systolic and diastolic heart failure ([facility])    Chronic kidney disease, stage III (moderate) ([facility])    Mixed hyperlipidemia    Sick sinus syndrome ([facility])    Type 2 diabetes mellitus with diabetic polyneuropathy ([address]    Spinal stenosis of lumbar region    Obesity, Class I, BMI 30-34.9    S/P partial colectomy    Tubulovillous adenoma of colon    Microalbuminuria    WD-PVD (peripheral vascular disease) ([facility])    Limb ischemia    Necrotic toes ([facility])    Toe gangrene ([facility])    Diabetic foot infection ([facility])    Chronic kidney disease (CKD) stage G3a/A2, moderately decreased glomerular filtration rate (GFR) between 45-59 mL/min/1.73 square meter and albuminuria creatinine ratio between  mg/g ([facility])    Edema    ICD (implantable cardioverter-defibrillator) battery depletion    Hyperkalemia    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[facility])    Ischemia of toe    Acute kidney injury ([facility])    Fluid overload    DM (diabetes mellitus) ([facility])    Precordial pain    Acute chest pain    Unstable angina ([facility])    Chronic kidney disease (CKD) stage G3a/A3, moderately decreased glomerular filtration rate (GFR) between 45-59 mL/min/1.73 square meter and albuminuria creatinine ratio greater than 300 mg/g ([facility])    Cardiomyopathy ([facility])    Diabetic neuropathy ([facility])    HTN (hypertension)    Epigastric pain    Acute on chronic congestive heart failure (HCC)    Leg edema    Acute on chronic systolic CHF (congestive heart failure) (HCC)    Diabetic foot ulcer with osteomyelitis ([address]    Visit for wound check    Moderate malnutrition ([address]    Long term (current) use of antibiotics    WD-Diabetic ulcer of toe of right foot associated with type 2 diabetes mellitus, with fat layer exposed ([address]    Diabetic ulcer of toe associated with type 2 diabetes mellitus, with bone involvement without evidence of necrosis (HCC)    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    Persistent wound pain    Receiving intravenous antibiotic treatment as outpatient    Acute on chronic respiratory failure with hypoxemia ([address]    WD-Chronic foot ulcer, left, with necrosis of bone (HCC)    Acute on chronic HFrEF (heart failure with reduced ejection fraction) (HCC)    Scrotal edema    Hypertensive urgency    Diabetic ulcer of right foot due to type 2 diabetes mellitus (HCC)    Cellulitis of foot    Toe osteomyelitis (HCC)    Heart failure exacerbated by sotalol (HCC)    CHF (congestive heart failure), NYHA class I, acute on chronic, combined (HCC)    Acute on chronic diastolic CHF (congestive heart failure) (HCC)    Leg swelling    Acute on chronic diastolic heart failure (HCC)    Skin ulcer of left foot including toes with fat layer exposed ([address]    Superficial incisional surgical site infection    Bacteremia due to Enterococcus    Acute respiratory failure with hypoxia and hypercapnia (HCC)    Acute kidney injury superimposed on CKD ([address]    Diabetic foot ([address]    Diabetic ulcer of midfoot associated with diabetes mellitus due to underlying condition, with muscle involvement without evidence of necrosis ([address]    Other seizures ([address]    Ulcer of both feet with fat layer exposed ([address]       Active Problems  Principal Problem:    Acute respiratory failure with hypoxia and hypercapnia ([address]  Active Problems:    Acute kidney injury superimposed on CKD ([address]    Other seizures ([address]    Ulcer of both feet with fat layer exposed (HCC)  Resolved Problems:    * No resolved hospital problems. *    Electronically signed by: Electronically signed by MICKY Becker CNP on 3/6/2023 at 8:53 AM

## 2023-03-06 NOTE — PROGRESS NOTES
Nephrology Progress Note  3/6/2023 6:33 AM        Subjective:   Admit Date: 2/11/2023  PCP: Alfredo Odonnell    Interval History: Right upper extremity pain at AV graft placement area is better    Diet: Reasonable    ROS: No overt shortness of breath  Urine output recorded only 400 cc for the last 24 hours  No fever, acceptable blood pressure    Data:     Current meds:    levoFLOXacin  750 mg Oral Every Other Day    empagliflozin  10 mg Oral Daily    isosorbide mononitrate  30 mg Oral Daily    magnesium oxide  400 mg Oral BID    atorvastatin  40 mg Oral Nightly    sodium hypochlorite   Topical Daily    tiotropium  2 puff Inhalation Daily    amLODIPine  10 mg Oral Daily    torsemide  40 mg Oral Daily    metroNIDAZOLE  500 mg Oral 3 times per day    silver sulfADIAZINE   Topical Daily    famotidine  20 mg Per NG tube Daily    heparin (porcine)  5,000 Units SubCUTAneous 3 times per day    sodium hypochlorite   Irrigation Daily    collagenase   Topical Daily    sodium chloride flush  5-40 mL IntraVENous 2 times per day    aspirin  81 mg Oral Daily    [Held by provider] clopidogrel  75 mg Oral Daily      dextrose      sodium chloride 10 mL/hr at 03/03/23 1404         I/O last 3 completed shifts:  In: -   Out: 775 [Urine:775]    CBC:   Recent Labs     03/04/23 0427 03/05/23  1225   WBC 7.5 6.7   HGB 7.4* 8.1*    170          Recent Labs     03/04/23  0427 03/05/23  1225    140   K 3.8 4.0    99   CO2 33* 31   BUN 48* 48*   CREATININE 3.8* 4.0*   GLUCOSE 119* 135*       Lab Results   Component Value Date    CALCIUM 7.7 (L) 03/05/2023    PHOS 2.8 03/05/2023       Objective:     Vitals: /63   Pulse 72   Temp 98.2 °F (36.8 °C) (Oral)   Resp 14   Ht 6' (1.829 m)   Wt 230 lb 2.6 oz (104.4 kg)   SpO2 98%   BMI 31.22 kg/m² ,    General appearance: Alert, awake and oriented  HEENT: Positive conjunctival pallor no scleral icterus  Neck: Seems supple  Lungs: No gross crackles  Heart: Regular rate and rhythm, left chest wall implanted cardiac device  Abdomen: Soft nontender  Extremities: Minimal leg edema with chronic wound  Right upper extremity brachiocephalic AV graft with good thrill on palpation good bruit auscultation-has some surrounding edema but pain is better has intact radial pulse      Problem List :         Impression :     Stage III acute kidney disease on top of CKD stage G4 A3-acceptable kidney function creatinine will vary due to underlying diuretics  Recent fluid overload, respiratory failure-and septic shock all getting better  Underlying atherosclerotic cardiovascular disease, dysrhythmia, hypertension anemia etc.  Protein energy wasting    Recommendation/Plan  :     From kidney standpoint he can be discharged as long as stable from AV graft area-we will make sure Dr. Liseth Zamudio is okay with it-he did get couple of dose of Epogen-and 1 dose of empirically IV iron-if he goes home he needs a CBC differential, CMP, magnesium and phosphorus in 5 to 7 days-also close follow-up with me in 10 to 12 days-as well as follow-up with Dr. Mary Sanchez, healthy Dash diet-call me with weight gain more than 2 pounds, increasing shortness of breath or edema      Jen Mann MD MD

## 2023-03-06 NOTE — PROGRESS NOTES
Pt evaluated by Dr. Lisa Dominguez. Arm wrapped with ace to decrease swelling, US doppler not needed at this time.   Jeanmarie Crook RN

## 2023-03-06 NOTE — PROGRESS NOTES
Dr Krunal Dalton at bedside, does not want pt transferred to [facility]. States to keep pt on stepdown.

## 2023-03-06 NOTE — PROGRESS NOTES
Progress Note    Subjective:      Caitlyn Mcmillan  I was notified by the nurse that she had noticed that the patient's arm was more swollen that there appeared to be a decreased in the thrill of the graft and that pulses were diminished. I came immediately to the hospital to assess the patient. The patient is postop day #2 from a placement of a right upper arm graft for hemodialysis. The patient was awake and alert he does complain of arm swelling and some discomfort he says has been swollen since yesterday but there is more swelling in the arm today he does not complain of hand coldness or pain. The patient has been on subcutaneous heparin since surgery  Objective:     BP (!) 121/58   Pulse 79   Temp 98.4 °F (36.9 °C) (Oral)   Resp 11   Ht 6' (1.829 m)   Wt 230 lb 2.6 oz (104.4 kg)   SpO2 97%   BMI 31.22 kg/m²     In: -   Out: 375 [Urine:375]         General: Awake and alert in good spirits  Abdomen: Soft  Lungs: Clear  Other: Examination the right upper extremity reveals a strong thrill within the graft it is patent with excellent flow the patient has swelling from the antecubital area down to the wrist consistent with venous hypertension and arm edema. He has a strong Doppler signal in the radial artery ulnar artery and also strong signals in the palmar branches of the hand. He has good capillary refill his hand is warm he has normal strength and does not complain of hand pain.     Labs:   CBC:   Lab Results   Component Value Date/Time    WBC 6.7 03/05/2023 12:25 PM    RBC 3.04 03/05/2023 12:25 PM    HGB 8.1 03/05/2023 12:25 PM    HCT 28.5 03/05/2023 12:25 PM    MCV 93.8 03/05/2023 12:25 PM    MCH 26.6 03/05/2023 12:25 PM    MCHC 28.4 03/05/2023 12:25 PM    RDW 20.3 03/05/2023 12:25 PM     03/05/2023 12:25 PM    MPV 10.0 03/05/2023 12:25 PM        Assessment:     70-year-old male with arm swelling secondary to venous hypertension following right upper arm graft with no signs of arterial thrombosis or graft thrombosis. Plan:   Arm elevation  I applied an Ace bandage from the wrist to just below the antecubital crease  There is no need for an ultrasound at this point to exclude axillary vein thrombosis given the swelling is below the elbow and his graft is highly functional.  Pain medication as required  Instructed the nurses to notify me for any change in present exam specifically any complaints of pain and coldness of the right hand or difficulty using his right hand.   Continue subcu heparin as ordered

## 2023-03-06 NOTE — CONSULTS
CARDIOLOGY CONSULT NOTE   Reason for consultation:  PVC    Referring physician:  Corky Subramanian MD     Primary care physician: Moriah Vazquez      Dear   Thanks for the consult. History of present illness:Anmol is a 67 y. o.year old who is ready for discharge after AV fistula surgery, cardiac consult is called because of PVCs, patient history of CAD ICD and potential with PAD, patient is very poor historian all information obtained from review medical record discussion with staff, patient magnesium was low also few days ago it was 1.6 he is on dialysis, denies any chest pain shortness of breath dizziness or palpitation has leg ulcers he had moderate to severe anemia and after surgery he lost a gram of hemoglobin, it s 7.1 today  Chief Complaint   Patient presents with    Respiratory Distress     Blood pressure, cholesterol, blood glucose and weight are well controlled. Past medical history:    has a past medical history of Acid reflux, Acute MI ([address] Arthritis, Broken teeth, CAD (coronary artery disease), Cardiomyopathy ([address] Cerebral artery occlusion with cerebral infarction ([address] CHF (congestive heart failure) ([address] Chronic back pain, Chronic kidney disease, Diabetes mellitus ([address] Diabetic neuropathy ([address] H/O cardiovascular stress test, H/O Doppler ultrasound, H/O percutaneous left heart catheterization, History of irregular heartbeat, History of syncope, Hyperlipidemia, Hypertension, Leg swelling, Necrotic toes (HCC), Neuropathy, PAD (peripheral artery disease) ([address] PVD (peripheral vascular disease) ([address] Sick sinus syndrome ([address] Sleep apnea, Spinal stenosis, Teeth missing, Type 2 diabetes mellitus without complication ([address] and WD-Chronic foot ulcer, left, with necrosis of bone ([address] Past surgical history:   has a past surgical history that includes Coronary angioplasty with stent; Dental surgery;  Colonoscopy (08/04/2016); pacemaker placement (06/04/2010); vascular surgery; colectomy (Right, 08/26/2016); Toe amputation (Right, 09/12/2017); Toe amputation (Right, 01/09/2018); Cardiac catheterization; Cardiac defibrillator placement (06/04/2010); Coronary angioplasty; Cardiac catheterization (07/14/2017); Cardiac catheterization (11/20/2018); Toe amputation (Left, 12/26/2020); IR TUNNELED CVC PLACE WO SQ PORT/PUMP > 5 YEARS (6/14/2021); Toe amputation (Left, 7/26/2022); Toe amputation (Right, 10/20/2022); IR TUNNELED CVC PLACE WO SQ PORT/PUMP > 5 YEARS (11/17/2022); and Dialysis fistula creation (Right, 3/3/2023). Social History:   reports that he has been smoking cigars. He has never used smokeless tobacco. He reports current drug use. Drug: Marijuana [place]). He reports that he does not drink alcohol.   Family history:   no family history of CAD, STROKE of DM    Allergies   Allergen Reactions    Pcn [Penicillins] Hives    Fentanyl Itching       magnesium sulfate 1000 mg in dextrose 5% 100 mL IVPB, Once  metoprolol succinate (TOPROL XL) extended release tablet 12.5 mg, Daily  levoFLOXacin (LEVAQUIN) tablet 750 mg, Every Other Day  empagliflozin (JARDIANCE) tablet 10 mg, Daily  isosorbide mononitrate (IMDUR) extended release tablet 30 mg, Daily  magnesium oxide (MAG-OX) tablet 400 mg, BID  atorvastatin (LIPITOR) tablet 40 mg, Nightly  sodium hypochlorite (DAKINS) 0.125 % external solution, Daily  tiotropium (SPIRIVA RESPIMAT) 2.5 MCG/ACT inhaler 2 puff, Daily  HYDROmorphone (DILAUDID) injection 0.25 mg, Q3H PRN  HYDROcodone-acetaminophen (NORCO) 7.5-325 MG per tablet 1 tablet, Q6H PRN  amLODIPine (NORVASC) tablet 10 mg, Daily  torsemide (DEMADEX) tablet 40 mg, Daily  ondansetron (ZOFRAN) injection 4 mg, Q6H PRN  ipratropium-albuterol (DUONEB) nebulizer solution 1 ampule, Q4H PRN  metroNIDAZOLE (FLAGYL) tablet 500 mg, 3 times per day  silver sulfADIAZINE (SILVADENE) 1 % cream, Daily  albuterol (PROVENTIL) nebulizer solution 2.5 mg, Q2H PRN  famotidine (PEPCID) tablet 20 mg, Daily  heparin flush 100 UNIT/ML injection 240 Units, PRN  heparin (porcine) injection 5,000 Units, 3 times per day  sodium hypochlorite (DAKINS) 0.125 % external solution, Daily  collagenase ointment, Daily  glucose chewable tablet 16 g, PRN  dextrose bolus 10% 125 mL, PRN   Or  dextrose bolus 10% 250 mL, PRN  glucagon (rDNA) injection 1 mg, PRN  dextrose 10 % infusion, Continuous PRN  sodium chloride flush 0.9 % injection 5-40 mL, 2 times per day  sodium chloride flush 0.9 % injection 10 mL, PRN  0.9 % sodium chloride infusion, PRN  aspirin chewable tablet 81 mg, Daily  [Held by provider] clopidogrel (PLAVIX) tablet 75 mg, Daily      Current Facility-Administered Medications   Medication Dose Route Frequency Provider Last Rate Last Admin    magnesium sulfate 1000 mg in dextrose 5% 100 mL IVPB  1,000 mg IntraVENous Once Skyla Estrada MD        metoprolol succinate (TOPROL XL) extended release tablet 12.5 mg  12.5 mg Oral Daily Skyla Estrada MD        levoFLOXacin (LEVAQUIN) tablet 750 mg  750 mg Oral Every Other Day Skyla Estrada MD   750 mg at 03/05/23 1729    empagliflozin (JARDIANCE) tablet 10 mg  10 mg Oral Daily Annamarie John MD   10 mg at 03/06/23 1042    isosorbide mononitrate (IMDUR) extended release tablet 30 mg  30 mg Oral Daily Emily Brown MD   30 mg at 03/06/23 1042    magnesium oxide (MAG-OX) tablet 400 mg  400 mg Oral BID Emily Brown MD   400 mg at 03/06/23 1042    atorvastatin (LIPITOR) tablet 40 mg  40 mg Oral Nightly Emily Brown MD   40 mg at 03/05/23 2029    sodium hypochlorite (DAKINS) 0.125 % external solution   Topical Daily Emily Brown MD        tiotropium (SPIRIVA RESPIMAT) 2.5 MCG/ACT inhaler 2 puff  2 puff Inhalation Daily Emily Brown MD   2 puff at 03/06/23 0723    HYDROmorphone (DILAUDID) injection 0.25 mg  0.25 mg IntraVENous Q3H PRN Emily Brown MD   0.25 mg at 03/05/23 2159    HYDROcodone-acetaminophen ([address]) 7.5-325 MG per tablet 1 tablet  1 tablet Oral Q6H PRN Emily Brown MD   1 tablet at 03/06/23 0023    amLODIPine (NORVASC) tablet 10 mg  10 mg Oral Daily Emily Brown MD   10 mg at 03/06/23 1042    torsemide (DEMADEX) tablet 40 mg  40 mg Oral Daily Emily Brown MD   40 mg at 03/06/23 1042    ondansetron (ZOFRAN) injection 4 mg  4 mg IntraVENous Q6H PRN Emily Brown MD        ipratropium-albuterol (DUONEB) nebulizer solution 1 ampule  1 ampule Inhalation Q4H PRN Emily Brown MD        metroNIDAZOLE (FLAGYL) tablet 500 mg  500 mg Oral 3 times per day Emily Brown MD   500 mg at 03/06/23 1522    silver sulfADIAZINE (SILVADENE) 1 % cream   Topical Daily Emily Brown MD   Given at 03/05/23 1741    albuterol (PROVENTIL) nebulizer solution 2.5 mg  2.5 mg Nebulization Q2H PRN Emily Brown MD   2.5 mg at 02/18/23 0555    famotidine (PEPCID) tablet 20 mg  20 mg Per NG tube Daily Emily Brown MD   20 mg at 03/06/23 1042    heparin flush 100 UNIT/ML injection 240 Units  240 Units IntraCATHeter PRN Emily Brown MD   240 Units at 02/15/23 1731    heparin (porcine) injection 5,000 Units  5,000 Units SubCUTAneous 3 times per day Emily Brown MD   5,000 Units at 03/06/23 1522    sodium hypochlorite (DAKINS) 0.125 % external solution   Irrigation Daily Emily Brown MD   Given at 03/05/23 1734    collagenase ointment   Topical Daily Emily Brown MD   Given at 03/05/23 1738    glucose chewable tablet 16 g  4 tablet Oral PRN Emily Brown MD        dextrose bolus 10% 125 mL  125 mL IntraVENous PRN Emily Brown MD   Stopped at 02/12/23 0022    Or    dextrose bolus 10% 250 mL  250 mL IntraVENous PRN Emily Brown MD        glucagon (rDNA) injection 1 mg  1 mg SubCUTAneous PRN Emily Brown MD        dextrose 10 % infusion   IntraVENous Continuous PRN Emily Brown MD        sodium chloride flush 0.9 % injection 5-40 mL  5-40 mL IntraVENous 2 times per day Harpreet Holloway Mitchell Castillo MD   10 mL at 03/06/23 1041    sodium chloride flush 0.9 % injection 10 mL  10 mL IntraVENous PRN Rodri Suarez MD   10 mL at 02/15/23 1731    0.9 % sodium chloride infusion   IntraVENous PRN Rodri Suarez MD 10 mL/hr at 03/03/23 1404 Restarted at 03/03/23 1601    aspirin chewable tablet 81 mg  81 mg Oral Daily Rodri Suarez MD   81 mg at 03/06/23 1042    [Held by provider] clopidogrel (PLAVIX) tablet 75 mg  75 mg Oral Daily Rodri Suarez MD   75 mg at 02/12/23 0506     Review of Systems:   Constitutional: No Fever or Weight Loss   Eyes: No Decreased Vision  ENT: No Headaches, Hearing Loss or Vertigo  Cardiovascular: No chest pain, dyspnea on exertion, palpitations or loss of consciousness  Respiratory: No cough or wheezing    Gastrointestinal: No abdominal pain, appetite loss, blood in stools, constipation, diarrhea or heartburn  Genitourinary: No dysuria, trouble voiding, or hematuria  Musculoskeletal:  No gait disturbance, weakness or joint complaints  Integumentary: No rash or pruritis  Neurological: No TIA or stroke symptoms  Psychiatric: No anxiety or depression  Endocrine: No malaise, fatigue or temperature intolerance  Hematologic/Lymphatic: No bleeding problems, blood clots or swollen lymph nodes  Allergic/Immunologic: No nasal congestion or hives  All systems negative except as marked. Physical Examination:    Vitals:    03/06/23 1100   BP: (!) 134/57   Pulse: 65   Resp: 13   Temp:    SpO2: 93%      Wt Readings from Last 3 Encounters:   03/06/23 232 lb 1.6 oz (105.3 kg)   01/11/23 260 lb (117.9 kg)   11/14/22 261 lb (118.4 kg)     Body mass index is 31.48 kg/m². General Appearance:  No distress, conversant    Constitutional:  Well developed, Well nourished, No acute distress, Non-toxic appearance.    HENT:  Normocephalic, Atraumatic, Bilateral external ears normal, Oropharynx moist, No oral exudates, Nose normal. Neck- Normal range of motion, No tenderness, Supple, No stridor,no apical-carotid delay, no carotid bruit  Eyes:  PERRL, EOMI, Conjunctiva normal, No discharge. Respiratory:  Normal breath sounds, No respiratory distress, No wheezing, No chest tenderness. ,no use of accessory muscles, diaphragm movement is normal  Cardiovascular: (PMI) apex non displaced,no lifts no thrills, no s3,no s4, Normal heart rate, Normal rhythm, No murmurs, No rubs, No gallops. Carotid arteries pulse and amplitude are normal no bruit, no abdominal bruit noted ( normal abdominal aorta ausculation), femoral arteries pulse and amplitude are normal no bruit, pedal pulses are normal  GI:  Bowel sounds normal, Soft, No tenderness, No masses, No pulsatile masses, no hepatosplenomegally, no bruits  : External genitalia appear normal, No masses or lesions. No discharge. No CVA tenderness. Musculoskeletal: Legs dressed up in dressing left arm has a fistula surgery intact distal pulses, No edema, No tenderness, No cyanosis, No clubbing. Good range of motion in all major joints. No tenderness to palpation or major deformities noted. Back- No tenderness. Integument:  Warm, Dry, No erythema, No rash. Skin: no rash, no ulcers  Lymphatic:  No lymphadenopathy noted. Neurologic:  Alert & oriented x 3, Normal motor function, Normal sensory function, No focal deficits noted. Psychiatric:  Affect normal, Judgment normal, Mood normal.   Lab Review   Recent Labs     03/05/23  1225   WBC 6.7   HGB 8.1*   HCT 28.5*         Recent Labs     03/05/23  1225      K 4.0   CL 99   CO2 31   PHOS 2.8   BUN 48*   CREATININE 4.0*     Recent Labs     03/05/23  1225   AST 13*   ALT <5*   BILITOT 0.4   ALKPHOS 61     No results for input(s): TROPONINI in the last 72 hours.   Lab Results   Component Value Date    BNP 67 03/27/2014    BNP 20 08/14/2013    BNP 29 01/21/2013     Lab Results   Component Value Date    INR 1.34 02/27/2023    PROTIME 17.3 (H) 02/27/2023         EKG:atrial paced    Chest Xray: effusion    ECHO: Normal LVEF  Labs, echo, meds reviewed  Assessment: 67 y. o.year old with PMH of  has a past medical history of Acid reflux, Acute MI ([address] Arthritis, Broken teeth, CAD (coronary artery disease), Cardiomyopathy ([address] Cerebral artery occlusion with cerebral infarction ([address] CHF (congestive heart failure) ([address] Chronic back pain, Chronic kidney disease, Diabetes mellitus ([address] Diabetic neuropathy ([address] H/O cardiovascular stress test, H/O Doppler ultrasound, H/O percutaneous left heart catheterization, History of irregular heartbeat, History of syncope, Hyperlipidemia, Hypertension, Leg swelling, Necrotic toes (HCC), Neuropathy, PAD (peripheral artery disease) ([address] PVD (peripheral vascular disease) ([address] Sick sinus syndrome ([address] Sleep apnea, Spinal stenosis, Teeth missing, Type 2 diabetes mellitus without complication ([address] and WD-Chronic foot ulcer, left, with necrosis of bone ([address] Recommendations:    PVCs, most likely reflection of underlying CAD and severe anemia and from hypomagnesemia, will recommend to replace magnesium add beta-blocker, his LVEF has been normal has ICD history of CAD with a stent in the past and severe PAD and on dialysis, patient is stable for discharge  Health maintenance: exerise and diet  All labs, medications and tests reviewed, continue all other medications of all above medical condition listed as is.          Mehdi Khanna MD, 3/6/2023 5:18 PM

## 2023-03-06 NOTE — PROGRESS NOTES
Physical Therapy  Name: Lewis Morillo MRN: [id number] :   1950   Date:  3/6/2023   Admission Date: 2023 Room:  -A   Restrictions/Precautions:        fall risk; general precautions; contact; [address] with other providers:  Elsa DIA states pt is ok to see for therapy    Subjective:  Patient states:  Patient is agreeable to transfer to [facility] and recliner today  Pain:   Location, Type, Intensity (0/10 to 10/10):  BLE pain with WB, demo's minimal     Objective:    Observation:  Patient is supine in bed, leaning to the Lt. Patient is on 2L NC O2    Treatment, including education/measures:    Transfers with line management of Tele Monitor, Pulse Ox  Supine to sit :with HOB fully elevated, Max A 1-2 for trunk support and BLE off EOB  Seated balance: On EOB, Initially Max A x 2, improves to SBA-CGA with MARGOTH and hip repositioning on EOB. Patient is SBA on BSC, but patient needs cues for upright posture   Scooting :Seated scooting Mod A for hip translation to EOB  Sit to stand :From EOB x 1 with no RW and close HHA Max A x 2 for maintaining upright posture and balance. , from [facility] x 3 sets Max A x 2 to RW. Standing posture: Max A x 2 for steadying and upright effort. Patient demo's decrease hip extension and decrease DF in stance, Patient needs Manual assist and for upright trunk effort over MARGOTH. He toelrates ~30 sec of studding before ending moderate seated rest break   Stand to sit :Mod A x 2 for safe descent and hip guidance to EOB to [facility] and recliner  SPT:Squat pivot to EOB to BSC and from [facility] to recliner Mod A x 2 for balance and upright effort    Neuro-sheba:   Patient needs verbal cues for upright posture in seated and standing. He required manual assist for weight shift over MARGOTH in sitting and standing. Patient demo's Fair- balance and has heavy reliance on RW for standing posture.      Safety  Patient left safely in the recliner, angela under patient, with call light/phone in reach with alarm applied. Gait belt and mask were used for transfers and gait. Assessment / Impression:     Patient's tolerance of treatment:  Fair   Adverse Reaction: decrease endurance  Significant change in status and impact:  increase tolerance with OOB mobility  Barriers to improvement:  endurance, strenght, balance    Plan for Next Session:    Will cont to work towards pt's goals per patient tolerance  Time in:  1137  Time out:  1230  Timed treatment minutes:  30  Total treatment time:  48  Previously filed items:  Social/Functional History  Lives With: Family, Daughter  Type of Home: House  Home Layout: Two level  Home Access: Ramped entrance  Bathroom Shower/Tub: Shower chair without back, Tub/Shower unit  Bathroom Equipment: Shower chair  Bathroom Accessibility: Not accessible  Home Equipment: Caralyn Prader, Electric scooter, Walker, standard, Hospital bed  [employer] Help From: Family  ADL Assistance: Needs assistance  Bath: Independent  Dressing: Independent  Grooming: Independent  Feeding: Independent  Toileting: Independent  Homemaking Assistance: Needs assistance  Ambulation Assistance: Needs assistance  Transfer Assistance: Needs assistance  Active : No  Mode of Transportation: Car  Occupation: Retired, On disability  Short Term Goals  Time Frame for Short Term Goals: 1 week  Short Term Goal 1: pt will complete bed mobility at [address] [place] [address] 2: pt will demonstrate fair - seated balance w/ min posterior support during participation of EOB  Short Term Goal 3: pt will demonstrate 3+/5 BLE strength  Short Term Goal 4: pt will demonstrate 20 mins of seated tolerance for performance at EOB       Electronically signed by:     Janeen Sr PTA  3/6/2023, 12:32 PM

## 2023-03-06 NOTE — OP NOTE
[address] [place] [address], [address] [place] [address]                                OPERATIVE REPORT    PATIENT NAME: María Elena Hurtado                       :        1950  MED REC NO:   [id number]                          ROOM:         ACCOUNT NO:   [de-identified]                           ADMIT DATE: 2023  PROVIDER:     Maribel Becker MD    DATE OF PROCEDURE:  2023    PREOPERATIVE DIAGNOSIS:  End-stage renal disease. POSTOPERATIVE DIAGNOSIS:  End-stage renal disease. OPERATION PERFORMED:  Placement of a right upper arm Xenograft from  axillary vein to brachial artery. SURGEON:  Maribel Becker MD    ANESTHESIA:  General anesthesia plus 20 mL of 0.25% Marcaine used  locally. BLOOD LOSS:  10 mL. DRAINS:  None. SPECIMENS:  None. COMPLICATIONS:  None. DETAIL OF THE PROCEDURE:  The patient was brought to the operating room. General anesthetic and preoperative antibiotics were administered. The  right arm prepped and draped in normal sterile fashion. I used local  anesthetic to anesthetize the skin and subcutaneous tissue near the very  proximal right brachium near the axilla made a transverse incision on  the ulnar aspect of the upper arm, went through skin and subcutaneous  tissue, superficial fascia. Cautery used for hemostasis. I incised the  fascia and dissected out the distal axillary vein. Careful dissection  ensued one small side branch. The vein was divided between silk ties  and the vein was encircled with a vessel loop and dissected out for  approximately 2 inches in length. At this point in time, I made an  incision in the ulnar aspect of the distal arm above the antecubital  crease. I went through skin and subcutaneous tissue, used cautery for  hemostasis, dissected down to the fascia, which was incised and found a  strong brachial pulse.   I dissected out the brachial artery, which was  large in caliber. I was able to dissect a crossing vein off of the  artery and encircle the artery with a vessel loop. At this point in  time, a prepared Artegraft was brought onto the field, beveled at the  end. I then placed vascular clamps on the vein, made a venotomy,  incised the vein, extended this with Goldstein scissors and approximately 15  mm anastomosis between the end of the graft from the side of the vein  was fashioned using running 6-0 Prolene. Once the anastomosis was  completed, I flushed the graft with heparinized saline and removed the  vascular clamps that flushed easily and a vascular clamp was placed on  the graft just proximal to the anastomosis. The graft was marked for  orientation. A small counter incision was made in the mid arm and it  was tunneled in a looped fashion down to the distal incision. The graft  was cut and then vascular clamps placed on the artery. An arteriotomy  was made, extended for approximately 10 mm with Goldstein scissors and again  an end-to-side anastomosis was fashioned with 6-0 Prolene. Backbleeding  and inflow were checked. The anastomosis was completed. Flow was  reestablished. A thrill in the graft was appreciated. Hemostasis of  both sites was adequate, but to ensure hemostasis, I placed fibrillar  along the anastomotic lines of both sites. The wounds were then closed  with absorbable sutures and Dermabond and Steri-Strips. No apparent  complications. Counts correct at the end the case. Returned to  Recovery in satisfactory condition.         Brittney Fitzgerald MD    D: 03/03/2023 15:59:31       T: 03/03/2023 16:01:52     [id number]  Job#: [id number]     Doc#: [id number]

## 2023-03-06 NOTE — CARE COORDINATION
SHANICEW noted that pt has a discharge and the plan is for the pt to go home with [address] [facility].  SHANICEW faxed the discharge paperwork to [address] [facility]

## 2023-03-06 NOTE — PROGRESS NOTES
Dr Arma Fleischer notified of R extremity pain/swelling. Awaiting eval at bedside.    Keshav Mata RN

## 2023-03-06 NOTE — PROGRESS NOTES
Occupational Therapy  . Occupational Therapy Treatment Note    Name: Helen Bernstein MRN: [id number] :   1950   Date:  3/6/2023   Admission Date: 2023 Room:  -A     Primary Problem:      Restrictions/Precautions:          General precautions, fall risk  contact    Communication with other providers: cotx with PTA Murtaza Even for assist and safety as well as patient tolerance with therapy. RN    Subjective:  Patient states:  I want to get to the bathroom today. Pain:   Location, Type, Intensity (0/10 to 10/10):  did not rate but c/o of BLE pain at times    Objective:    Observation: patient in high fowlers. L lateral lean heavily noted. Lunch mostly untouched. Agreeable to therapy. Does need increased time for rest breaks d/t SOB and fatigue. Bandages on B feet  Objective Measures:  tele, 2L 02    Treatment, including education:    ADL activity training was instructed today. Cues were given for safety, sequence, UE/LE placement, visual cues, and balance. Activities performed today included dressing, toileting,     Toileting- Max A on BSC. Dina care in stance needing multiple trials for completion d/t fatigue during stand. ( Seated rest breaks required- only able to tolerate x20-30 seconds each stand) Patient needing assist with threading BLE into new depends, but able to hike to knees but required assist for completing  hike all the way up. Patient needing increased time on toilet for completing BM. LB dressing- DEP for socks    Therapeutic activity training was instructed today. Cues were given for safety, sequence, UE/LE placement, awareness, and balance. Activities performed today included bed mobility training, sup-sit, sit-stand, SPT. High fowlers to EOB- Max A     Scooting hips forward- Mod A with good effort from patient. Eob sitting balance- initially Max x2 but did progress to SBA after time and repositioning. Patient tolerated x10 Min EOB.  Cues needed for safety and posture throughout. Does have noted L lateral lean. Transfer to  to [facility]- Max x2  with close  [facility]   with cues for safety, technique and sequencing. Patient cued for upright posture throughout. Stand from [facility]- x3 trials with FWW with Max x2 and vcs/tcs for posture and safety. Patient has retro lean and x1 trial had Minor LOB that resulted in ease down to BSC.(Unsure if cognition or anxiety hindered patient at this time.)    Sitting balance on commode- initially Max A- progressed to Close SBA d/t minor L lateral lean- cues needed throughout for posture. Standing balance- poor for all trials. Cues for posture and retro lean. Does need increased rest breaks after each stand for while body fatigue and SOB. Patient SPT from [facility] to Recliner- with Max x2 cues for posture and technique throughout. Cues were given for position, posture, kinesthetic sense, safety, recruitment, and rationale. Cues were verbal and/or tactile. Static balance w/ tapping and variety of tactile stimuli to orient to upright and correct L lean during seated on BSC, in high fowlers and recliner. Assisted trunk flexion and oblique activity        Patient educated on role of OT , benefits of OT and rationale for therapeutic intervention. Benefit of OOB/EOB activities, benefit of movement. AE/AD, WS/EC,     All therapeutic intervention performed c emphasis on dynamic balance / standing tolerance to increase strength, endurance and activity tolerance for increased Dobson c ADL tasks and func transfers / mobility. Patient left safely in bedside chair at end of session, with call light in reach, alarm on and nursing aware. Gait belt was used for func transfers / mobility. Assessment / Impression:    Patient did tolerate well but requires increased rest breaks. May benefit from wearing post op shies with transfers for better balance and comfort.    Patient's tolerance of treatment: fair +  Adverse Reaction: None  Significant change in status and impact: Improved from initial evaluation  Barriers to improvement: weakness, SOB, fatigue      Plan for Next Session:    Continue with OT POC      Time in:  1137  Time out:  1230  Timed treatment minutes:  40  Total treatment time:  53      Electronically signed by:    BETINA Hernandez COTA/L [id number]    3/6/2023, 1:11 PM

## 2023-03-07 NOTE — PROGRESS NOTES
Transportation set up with [facility] for [address] [place] [facility] NP aware of need for portable oxygen prescription before pt discharges.

## 2023-03-07 NOTE — PROGRESS NOTES
Patient oxygen saturation around 79% on room air. Currently requiring 2 L supplemental oxygen. However patient does not have oxygen set up at home. Will cancel discharge for tonight pending review by attending in a.m.

## 2023-03-07 NOTE — PROGRESS NOTES
Pt. Has home oxygen ordered at home for HS. Pt had home O2 eval ordered for oxygen needs at rest and with exertion. Pt dropped to 87% at rest on room air, but started to move around and sats dropped further to 84%. Pt does qualify for home oxygen continuous at this time. Pt placed on 2L oxygen and sats increased to 94%.

## 2023-03-07 NOTE — PROGRESS NOTES
Pt was going to be discharged. This RN became aware that the pt did not have the correct converter in order for his home oxygen to work in a vehicle. Pt went to 79% when placed on room air (while lying in bed). Pt would not have any any oxygen to wear when attending follow-up appointments. The night shift attending provider and day shift attending provider were informed and the discharge was cancelled. Pt upset, refusing all care (including vitals, medications, and telemetry wires, except pulse oximetry). This RN attempted to educate the pt on the risks of not having home oxygen available in his car. Pt was wearing oxygen (2 L) and the bed alarm was set.

## 2023-03-07 NOTE — CARE COORDINATION
- Room # 2005 for Mr Geena Avila --Pt was scheduled to be transported home with [facility] earlier (about 7 PM ) with Oxygen . Some confusion has occurred when discharging pt -consequently transport was cancelled and discharge orders were cancelled as well.     ----Mr Geena Avila can go home tonight with [facility] with O2 , he has oxygen generator at home , the nurse reported that daughter has refused to accept him home due to not having the proper equipment sent home with him.  spoke with his daughter Lydia Lares ([phone].) She was agreeable to the Plan and discussions set forth by this CM.     ----The discharge MD wanted to write an order for portable Oxygen so he (pt) can get to his follow up appointments --this is not needed at this time --upon discharge he can go home with the prescriptions to be filled in the next day or two with the existing oxygen company that they or the [place] [id number] can do. His next appointment wont be in the following day or two  ---Greater information was gained thru further searches----20:30-?  ---Plan of care pending awaiting decision from the new assigned MD for PT. .   ---R/T and MD involved -R/T was able to Identify that [facility] was involved with the O2 distribution --BUT --it appears that he only had orders for PRN --HS-- O2 , not continuous . R/ T  [facility] [place] then did the inpatient testing for oxygen --which he passed for continuous O2 need . Orders were issued and were sent to [facility] [place] - DME oxygen for the portable O2      --Again -- [facility] was called --pt was discharged and  taken home .     Jessee Salazar / [facility] / [facility]

## 2023-03-07 NOTE — CONSULTS
Discussed consult with primary nurse. Consult canceled. Patient refusing PIV at this time. Please consult IV/PICC team for any questions or if patient's needs change.

## 2023-03-13 NOTE — PROGRESS NOTES
Cardiology Progress Note     Today's Plan: Echo today. Admit Date:  1/22/2022    Consult reason/ Seen today for: Elevated Troponin     Subjective and  Overnight Events: Patient denies any chest pain and shortness of breath has improved. History of Presenting Illness:    Chief complain on admission : 70 y. o.year old who is admitted for  Chief Complaint   Patient presents with    Groin Swelling     scrotum very large and leaking fluid    Leg Swelling        Past medical history:    has a past medical history of Acid reflux, Acute MI ([address] Arthritis, Broken teeth, CAD (coronary artery disease), Cardiomyopathy ([address] CHF (congestive heart failure) ([address] Chronic back pain, Chronic kidney disease, Diabetes mellitus ([address] Diabetic neuropathy ([address] H/O cardiovascular stress test, H/O Doppler ultrasound, H/O percutaneous left heart catheterization, History of irregular heartbeat, History of syncope, Hyperlipidemia, Hypertension, Leg swelling, Necrotic toes (HCC), Neuropathy, neuropathy, PAD (peripheral artery disease) ([address] PVD (peripheral vascular disease) ([address] Sick sinus syndrome ([address] Sleep apnea, Spinal stenosis, Teeth missing, Type 2 diabetes mellitus without complication ([address] and WD-Chronic foot ulcer, left, with necrosis of bone ([address] Past surgical history:   has a past surgical history that includes Coronary angioplasty with stent; Dental surgery; Colonoscopy (08/04/2016); pacemaker placement (06/04/2010); vascular surgery; colectomy (Right, 08/26/2016); Toe amputation (Right, 09/12/2017); Toe amputation (Right, 01/09/2018); Cardiac catheterization; Cardiac defibrillator placement (06/04/2010); Coronary angioplasty; Cardiac catheterization (07/14/2017); Cardiac catheterization (11/20/2018); Toe amputation (Left, 12/26/2020); and IR TUNNELED CVC PLACE WO SQ PORT/PUMP > 5 YEARS (6/14/2021).   Social History: Infectious Disease Progress Note  3/13/2023   Patient Name: Bhakti Schwartz : 1965   Impression  Sepsis Secondary to Infected Acute on Chronic Decubitus Sacral Wound:  Chronic Necrotic Wounds: Left 3rd Finger, Left Hallux, Left Ankle and Heel:  C.dif Colitis:   Cryptosporidium Positive:  MSSA Screen Positive: Allergy to vancomycin (\"makes me very sick\"): Tolerating vancomycin this admit  Afebrile, Leukocytosis elevated but on DWT   CrCl 14 on HD  CRP and Pct elevated  3/10-Reordered Sacral Wound Culture: Beta Strep Group C, diphtheroids, Candida albicans  3/8-BC 0/2 NGTD  3/11-Cdif positive  Cryptosporidium positive by PCR:  3/8-CXR: Pulmonary vascular congestion with mild pulmonary edema. 3/9-Complete Echo: EF 55%, Grade I DD, septal wall is hypokinetic. Sclerotic, but non-stenotic AV. MAC with mild MR. Mild TR. No evidence of pericardial effusion. , general surgery, rec Irrigating wound VAC, placement 3/10  DMII:  ESRD on HD:  Dalton Mcdonald onboard  CAD s/p CABG/ HFrEF:  PAD s/p RBKA :  Depression:  Multi-morbidity: per PMHx    Plan:  DC IV ceftriaxone and vancomycin  Start IV Zosyn 3,375 mg q8h x 14 cumulative days to cover Strep C and diptheroids (end date 3/23/2023)  Start po fluconazole 200 mg q24h, will observe closely as QTc is 520, as dose is low at 200 mg (end date 3/23/2023)  Ordered another EKG 3/13  Repeat order EKG in am 3/14 to eval for increased QTc after starting fluconazole  Trend CRP and Pct, ordered    Ongoing Antimicrobial Therapy  Ceftriaxone 3/12-  Dificid 3/12-? Fluconazole 3/13-  Completed Antimicrobial Therapy  Vancomycin 3/9-10  Zosyn 3/9-? History:? Interval history noted. Chief complaint: sepsis secondary to probable infected acute on chronic decubitus sacral wound. Chronic necrotic wounds of left 3rd finger, left hallux, left ankle and heel.    Denies n/v/d/f or untoward effects of antibiotics  Physical Exam:  Vital Signs: BP (!) 124/50   Pulse 77   Temp 98 °F (36.7 °C)   Resp 18   Ht 5' 3\" (1.6 m)   Wt 166 lb 3.2 oz (75.4 kg)   SpO2 95%   BMI 29.44 kg/m²     Gen: drowsy, no distress  Wounds: C/D/I multiple extremity necrotic wounds. Irrigating wound VAC intact Sacral/coccyx wound. Remote wound from RBKA well healed. Chest: no distress and CTA. Good air movement. Room air. Heart: NSR and no MRG. Abd: soft, non-distended, no tenderness, no hepatomegaly. Normoactive bowel sounds. Ext: no clubbing, cyanosis, or edema. S/p RBKA. Neuro: Mental status intact. CN 2-12 intact and no focal sensory or motor deficits     Radiologic / Imaging / TESTING  3/8/2023 XR Chest Portable:  Impression   Pulmonary vascular congestion with mild pulmonary edema. 3/9/2023 Echo Complete:    Summary   Ejection fraction is visually estimated at 55%. Grade I diastolic dysfunction. Septal wall is hypokinetic. Sclerotic, but non-stenotic aortic valve. MAC with mild mitral regurgitation. Mild tricuspid regurgitation; RVSP: 22 mmHg. No evidence of any pericardial effusion. 3/11/2023 XR Chest Portable:  Impression   Findings consistent with congestive heart failure and/or diffuse   pneumonitis/atypical pneumonia with very small left basilar pleural effusion. Findings may be accentuated by underlying chronic lung disease.      Labs:    Recent Results (from the past 24 hour(s))   POCT Glucose    Collection Time: 03/12/23  8:19 PM   Result Value Ref Range    POC Glucose 102 (H) 70 - 99 MG/DL   CBC with Auto Differential    Collection Time: 03/13/23  1:35 AM   Result Value Ref Range    WBC 28.3 (H) 4.0 - 10.5 K/CU MM    RBC 3.32 (L) 4.2 - 5.4 M/CU MM    Hemoglobin 10.4 (L) 12.5 - 16.0 GM/DL    Hematocrit 35.1 (L) 37 - 47 %    .7 (H) 78 - 100 FL    MCH 31.3 (H) 27 - 31 PG    MCHC 29.6 (L) 32.0 - 36.0 %    RDW 14.4 11.7 - 14.9 %    Platelets 493 (L) 972 - 440 K/CU MM    MPV 11.4 (H) 7.5 - 11.1 FL    Differential Type AUTOMATED DIFFERENTIAL     Segs Relative 85.2 (H) reports that he quit smoking about 3 months ago. His smoking use included cigars. He started smoking about 42 years ago. He has a 9.00 pack-year smoking history. He has never used smokeless tobacco. He reports current drug use. Drug: Marijuana [place] [employer]). He reports that he does not drink alcohol. Family history:  family history includes Cancer in his brother, father, and son; Diabetes in his brother, mother, and sister; Early Death (age of onset: 62) in his sister; Heart Disease in his brother and sister; High Blood Pressure in his brother, brother, and mother; Neuropathy in his sister; Other in his sister; Stroke in his mother; Vision Loss in his mother. Allergies   Allergen Reactions    Pcn [Penicillins] Hives    Fentanyl Itching       Review of Systems:  Review of Systems   Respiratory: Negative for shortness of breath. Cardiovascular: Negative for chest pain, palpitations and leg swelling. Musculoskeletal: Negative. Skin: Negative. Neurological: Negative for dizziness and weakness. All other systems reviewed and are negative. BP (!) 140/55   Pulse 59   Temp 98.7 °F (37.1 °C) (Oral)   Resp 16   Ht 6' (1.829 m)   Wt 294 lb 15.6 oz (133.8 kg)   SpO2 91%   BMI 40.01 kg/m²       Intake/Output Summary (Last 24 hours) at 1/25/2022 1559  Last data filed at 1/25/2022 1331  Gross per 24 hour   Intake --   Output 5000 ml   Net -5000 ml       Physical Exam:  Physical Exam  Constitutional:       Appearance: He is well-developed. Cardiovascular:      Rate and Rhythm: Normal rate and regular rhythm. Pulses: Intact distal pulses. Dorsalis pedis pulses are 1+ on the right side and 1+ on the left side. Posterior tibial pulses are 1+ on the right side and 1+ on the left side. Heart sounds: Normal heart sounds, S1 normal and S2 normal.   Pulmonary:      Effort: Pulmonary effort is normal.      Breath sounds: Normal breath sounds.    Musculoskeletal:         General: Normal 36 - 66 %    Lymphocytes % 4.8 (L) 24 - 44 %    Monocytes % 4.1 (H) 0 - 4 %    Eosinophils % 0.5 0 - 3 %    Basophils % 0.0 0 - 1 %    Segs Absolute 24.1 K/CU MM    Lymphocytes Absolute 1.4 K/CU MM    Monocytes Absolute 1.2 K/CU MM    Eosinophils Absolute 0.2 K/CU MM    Basophils Absolute 0.0 K/CU MM    Nucleated RBC % 0.0 %    Total Nucleated RBC 0.0 K/CU MM    Total Immature Neutrophil 1.54 K/CU MM    Immature Neutrophil % 5.4 (H) 0 - 0.43 %   Comprehensive Metabolic Panel w/ Reflex to MG    Collection Time: 03/13/23  1:35 AM   Result Value Ref Range    Sodium 133 (L) 135 - 145 MMOL/L    Potassium 4.7 3.5 - 5.1 MMOL/L    Chloride 99 99 - 110 mMol/L    CO2 16 (L) 21 - 32 MMOL/L    BUN 51 (H) 6 - 23 MG/DL    Creatinine 4.2 (H) 0.6 - 1.1 MG/DL    Est, Glom Filt Rate 12 (L) >60 mL/min/1.73m2    Glucose 100 (H) 70 - 99 MG/DL    Calcium 7.5 (L) 8.3 - 10.6 MG/DL    Albumin 1.6 (L) 3.4 - 5.0 GM/DL    Total Protein 4.5 (L) 6.4 - 8.2 GM/DL    Total Bilirubin 0.3 0.0 - 1.0 MG/DL    ALT 7 (L) 10 - 40 U/L    AST 18 15 - 37 IU/L    Alkaline Phosphatase 376 (H) 40 - 128 IU/L    Anion Gap 18 (H) 4 - 16     CULTURE results: Invalid input(s): BLOOD CULTURE,  URINE CULTURE, SURGICAL CULTURE    Diagnosis:  Patient Active Problem List   Diagnosis    CAD (coronary artery disease)    Hyperlipidemia    Diabetes mellitus (HCC)    Chest pain    Hoarseness of voice    Fracture of metacarpal bone    Wrist sprain    History of tobacco abuse    Wrist pain    Carpal tunnel syndrome    Cubital tunnel syndrome    Trigger finger    S/P CABG x 4    Diabetic foot ulcer (HCC)    Diabetic neuropathy (HCC)    Diabetes mellitus type 2 with complications (HCC)    Claudication of both lower extremities (HCC)    SOB (shortness of breath)    Bilateral leg edema    SOB (shortness of breath)    Type 2 diabetes mellitus without complication (HCC)    Coronary artery disease involving coronary bypass graft of native heart with unstable angina pectoris (HCC) range of motion. Right lower leg: Edema present. Left lower leg: Edema present. Skin:     General: Skin is warm and dry. Neurological:      Mental Status: He is alert and oriented to person, place, and time. Medications:    insulin lispro  0-18 Units SubCUTAneous TID WC    vancomycin  1,250 mg IntraVENous Once    vancomycin (VANCOCIN) intermittent dosing (placeholder)   Other See Admin Instructions    furosemide  80 mg IntraVENous 4x Daily    chlorothiazide (DIURIL) IVPB  500 mg IntraVENous Q12H    atorvastatin  40 mg Oral Nightly    carvedilol  3.125 mg Oral BID    clopidogrel  75 mg Oral Daily    metaxalone  800 mg Oral TID    tiotropium  2 puff Inhalation Daily    sodium chloride flush  5-40 mL IntraVENous 2 times per day    heparin (porcine)  5,000 Units SubCUTAneous 3 times per day    lisinopril  5 mg Oral Daily    spironolactone  25 mg Oral Daily    insulin lispro  0-9 Units SubCUTAneous Nightly      sodium chloride      dextrose       oxyCODONE, albuterol sulfate HFA, sodium chloride flush, sodium chloride, ondansetron **OR** ondansetron, polyethylene glycol, acetaminophen **OR** acetaminophen, glucose, glucagon (rDNA), dextrose, acetaminophen, ondansetron, benzonatate, melatonin, ALPRAZolam, aluminum & magnesium hydroxide-simethicone, morphine, dextrose bolus (hypoglycemia) **OR** dextrose bolus (hypoglycemia)    Lab Data:  CBC:   Recent Labs     01/24/22  0715 01/25/22  0605   WBC 11.8* 9.1   HGB 8.8* 8.3*   HCT 30.0* 27.3*   MCV 88.0 85.0    204     BMP:   Recent Labs     01/23/22  0657 01/24/22  0715 01/25/22  0605   * 136 136   K 4.9 5.0 4.8    103 101   CO2 24 23 24   PHOS  --  2.9  --    BUN 35* 40* 45*   CREATININE 2.2* 2.2* 2.4*     PT/INR: No results for input(s): PROTIME, INR in the last 72 hours. BNP:    No results for input(s): PROBNP in the last 72 hours.   TROPONIN:   Recent Labs     01/23/22  0657   TROPONINT 0.048* Impression:  Principal Problem:    Acute on chronic HFrEF (heart failure with reduced ejection fraction) ([facility])  Active Problems:    Type 2 diabetes mellitus with diabetic polyneuropathy ([facility])    Leg edema    Scrotal edema    Hypertensive urgency  Resolved Problems:    * No resolved hospital problems. *     MEDICAL DECISION MAKING :      # Elevated troponin    Non-ACS trend Type II MI Demand ischemia  No ischemic work-up planned at this point    #History of coronary disease s/p PCI  Last left heart cath in 2018 with patent stents noted in LAD circumflex and RCA  Continue with Plavix  Continue with Coreg  Continue with Lipitor    # congestive heart failure , RV failure    History of noncompliance  Continue with IV Lasix 80 mg 4 times daily  Nephrology following  Echocardiogram severe volume overload with RV failure/elevated pressure    #   Essential hypertension: Stable   Continue with Coreg Aldactone and lisinopril    #   HLD: Cont statins      #  Acute renal failure management per nephrology       Echo shows     Left ventricular systolic function is normal.   Ejection fraction is visually estimated at 50%. Moderate left ventricular hypertrophy. Septal flattening due to RVVO and pressure overload. PPM wiring visualized in right heart. Mildly dilated right atrium. Severely dilated right ventricle cavity. Severe tricuspid regurgitation; RVSP: 75 mmHg. No evidence of any pericardial effusion. Right pleural effusion. Dilated aortic root (4.0 cm). Inferior vena cava is dilated, measuring at 2.3 cm, and does not collapse   with respiration.         Dr. Grupo Santiago MD S/P cardiac cath    Chronic renal failure, stage 2 (mild)    Chronic kidney disease (CKD) stage G4/A3, severely decreased glomerular filtration rate (GFR) between 15-29 mL/min/1.73 square meter and albuminuria creatinine ratio greater than 300 mg/g (HCC)    DM (diabetes mellitus) ([address]    Coronary atherosclerosis    HTN (hypertension)    Proteinuria    Callus of foot    Non compliance with medical treatment    Diabetic ulcer of right heel associated with type 2 diabetes mellitus, with necrosis of bone (HCC)    Acute pain of right foot    Wound, open, foot with complication, left, sequela    ESRD on hemodialysis ([address]    Diabetic ulcer of right midfoot associated with type 2 diabetes mellitus, with necrosis of bone (HCC)    Pressure ulcer of sacral region, stage 3 (HCC)    S/P BKA (below knee amputation), right (HCC)    Diabetic ulcer of left foot associated with type 2 diabetes mellitus, with necrosis of muscle (HCC)    Ulcer of finger, with fat layer exposed ([facility]    Sepsis ([facility]    Severe malnutrition (HCC)    Infected decubitus ulcer, stage IV (HCC)    ESRD (end stage renal disease) ([address]    Elevated troponin       Active Problems  Principal Problem:    Sepsis ([facility]  Active Problems:    Severe malnutrition (HCC)    Infected decubitus ulcer, stage IV (HCC)    ESRD (end stage renal disease) ([facility]    Elevated troponin  Resolved Problems:    * No resolved hospital problems. *    Electronically signed by: Electronically signed by RYAN Adan CNP on 3/13/2023 at 1:24 PM

## 2023-03-14 PROBLEM — N17.9 AKI (ACUTE KIDNEY INJURY) (HCC): Status: ACTIVE | Noted: 2023-03-14

## 2023-03-16 ENCOUNTER — HOSPITAL ENCOUNTER (OUTPATIENT)
Dept: WOUND CARE | Age: 73
Discharge: HOME | End: 2023-03-16
Payer: MEDICARE

## 2023-03-16 VITALS
SYSTOLIC BLOOD PRESSURE: 158 MMHG | DIASTOLIC BLOOD PRESSURE: 93 MMHG | HEART RATE: 68 BPM | RESPIRATION RATE: 16 BRPM | TEMPERATURE: 97 F

## 2023-03-16 DIAGNOSIS — E11.621 DIABETIC ULCER OF TOE OF RIGHT FOOT ASSOCIATED WITH TYPE 2 DIABETES MELLITUS, WITH FAT LAYER EXPOSED (HCC): ICD-10-CM

## 2023-03-16 DIAGNOSIS — L97.512 DIABETIC ULCER OF TOE OF RIGHT FOOT ASSOCIATED WITH TYPE 2 DIABETES MELLITUS, WITH FAT LAYER EXPOSED (HCC): ICD-10-CM

## 2023-03-16 DIAGNOSIS — I73.9 PVD (PERIPHERAL VASCULAR DISEASE) (HCC): ICD-10-CM

## 2023-03-16 DIAGNOSIS — M86.9 TOE OSTEOMYELITIS (HCC): Primary | ICD-10-CM

## 2023-03-16 DIAGNOSIS — L97.519 DIABETIC ULCER OF RIGHT FOOT DUE TO TYPE 2 DIABETES MELLITUS (HCC): ICD-10-CM

## 2023-03-16 DIAGNOSIS — L97.524 CHRONIC FOOT ULCER, LEFT, WITH NECROSIS OF BONE (HCC): ICD-10-CM

## 2023-03-16 DIAGNOSIS — E11.621 DIABETIC ULCER OF RIGHT FOOT DUE TO TYPE 2 DIABETES MELLITUS (HCC): ICD-10-CM

## 2023-03-16 DIAGNOSIS — E11.42 TYPE 2 DIABETES MELLITUS WITH DIABETIC POLYNEUROPATHY, UNSPECIFIED WHETHER LONG TERM INSULIN USE (HCC): ICD-10-CM

## 2023-03-16 PROCEDURE — 6370000000 HC RX 637 (ALT 250 FOR IP): Performed by: STUDENT IN AN ORGANIZED HEALTH CARE EDUCATION/TRAINING PROGRAM

## 2023-03-16 PROCEDURE — 11042 DBRDMT SUBQ TIS 1ST 20SQCM/<: CPT

## 2023-03-16 RX ORDER — GENTAMICIN SULFATE 1 MG/G
OINTMENT TOPICAL ONCE
OUTPATIENT
Start: 2023-03-16 | End: 2023-03-16

## 2023-03-16 RX ORDER — CLOBETASOL PROPIONATE 0.5 MG/G
OINTMENT TOPICAL ONCE
OUTPATIENT
Start: 2023-03-16 | End: 2023-03-16

## 2023-03-16 RX ORDER — BETAMETHASONE DIPROPIONATE 0.05 %
OINTMENT (GRAM) TOPICAL ONCE
OUTPATIENT
Start: 2023-03-16 | End: 2023-03-16

## 2023-03-16 RX ORDER — GINSENG 100 MG
CAPSULE ORAL ONCE
OUTPATIENT
Start: 2023-03-16 | End: 2023-03-16

## 2023-03-16 RX ORDER — LIDOCAINE 40 MG/G
CREAM TOPICAL ONCE
OUTPATIENT
Start: 2023-03-16 | End: 2023-03-16

## 2023-03-16 RX ORDER — BACITRACIN ZINC AND POLYMYXIN B SULFATE 500; 1000 [USP'U]/G; [USP'U]/G
OINTMENT TOPICAL ONCE
OUTPATIENT
Start: 2023-03-16 | End: 2023-03-16

## 2023-03-16 RX ORDER — GENTAMICIN SULFATE 1 MG/G
OINTMENT TOPICAL ONCE
Status: COMPLETED | OUTPATIENT
Start: 2023-03-16 | End: 2023-03-16

## 2023-03-16 RX ORDER — LIDOCAINE HYDROCHLORIDE 20 MG/ML
JELLY TOPICAL ONCE
OUTPATIENT
Start: 2023-03-16 | End: 2023-03-16

## 2023-03-16 RX ORDER — LIDOCAINE 50 MG/G
OINTMENT TOPICAL ONCE
OUTPATIENT
Start: 2023-03-16 | End: 2023-03-16

## 2023-03-16 RX ORDER — BACITRACIN, NEOMYCIN, POLYMYXIN B 400; 3.5; 5 [USP'U]/G; MG/G; [USP'U]/G
OINTMENT TOPICAL ONCE
OUTPATIENT
Start: 2023-03-16 | End: 2023-03-16

## 2023-03-16 RX ORDER — LIDOCAINE HYDROCHLORIDE 40 MG/ML
SOLUTION TOPICAL ONCE
OUTPATIENT
Start: 2023-03-16 | End: 2023-03-16

## 2023-03-16 RX ADMIN — GENTAMICIN SULFATE: 1 OINTMENT TOPICAL at 15:50

## 2023-03-16 RX ADMIN — COLLAGENASE SANTYL: 250 OINTMENT TOPICAL at 15:50

## 2023-03-16 ASSESSMENT — PAIN DESCRIPTION - PAIN TYPE: TYPE: CHRONIC PAIN

## 2023-03-16 ASSESSMENT — PAIN SCALES - GENERAL: PAINLEVEL_OUTOF10: 7

## 2023-03-16 ASSESSMENT — PAIN DESCRIPTION - ONSET: ONSET: ON-GOING

## 2023-03-16 ASSESSMENT — PAIN DESCRIPTION - ORIENTATION: ORIENTATION: RIGHT;LEFT

## 2023-03-16 ASSESSMENT — PAIN - FUNCTIONAL ASSESSMENT: PAIN_FUNCTIONAL_ASSESSMENT: PREVENTS OR INTERFERES SOME ACTIVE ACTIVITIES AND ADLS

## 2023-03-16 ASSESSMENT — PAIN DESCRIPTION - LOCATION: LOCATION: FOOT

## 2023-03-16 ASSESSMENT — PAIN DESCRIPTION - FREQUENCY: FREQUENCY: INTERMITTENT

## 2023-03-16 ASSESSMENT — PAIN DESCRIPTION - DESCRIPTORS: DESCRIPTORS: STABBING

## 2023-03-16 NOTE — PROGRESS NOTES
Multilayer Compression Wrap   (Not Unna) Below the Knee    NAME:  Cherelle Santiago  YOB: 1950  MEDICAL RECORD NUMBER:  [id number]  DATE:  3/16/2023    Multilayer compression wrap: Removed old Multilayer wrap if indicated and wash leg with mild soap/water. Applied moisturizing agent to dry skin as needed. Applied primary and secondary dressing as ordered. Applied multilayered dressing below the knee to right lower leg. Applied multilayered dressing below the knee to left lower leg. Instructed patient/caregiver not to remove dressing and to keep it clean and dry. Instructed patient/caregiver on complications to report to provider, such as pain, numbness in toes, heavy drainage, and slippage of dressing. Instructed patient on purpose of compression dressing and on activity and exercise recommendations.       Electronically signed by Viola Brar LPN on 4/38/4095 at 7:30 PM

## 2023-03-16 NOTE — DISCHARGE INSTRUCTIONS
PHYSICIAN ORDERS AND DISCHARGE INSTRUCTIONS     NOTE: Upon discharge from the [address], you will receive a patient experience survey. We would be grateful if you would take the time to fill this survey out. Wound care order history:                    Vascular studies: 6/8/2021 Date 11/12/2021              Imaging:  XRAY ordered for left foot 4/19/2022              Cultures: Left 2nd toe bone obtained on 11/12/2021              Grafts:  Date              Antibiotics:              Earlier Wound care treatments:              Primary care physician:     Continuing wound care orders and information:              Residence:                Continue home health care with:  Does not remember           Wound Medications:              Wound cleansing:                          Do not scrub or use excessive force. Wash hands with soap and water before and after dressing changes. Prior to applying a clean dressing, cleanse wound with normal saline,                                wound cleanser, or mild soap and water. Ask the physician or nurse before getting the wound(s) wet in a shower              Daily Wound management:                          Keep weight off wounds and reposition every 2 hours. Avoid standing for long periods of time. Apply wraps/stockings in AM and remove at bedtime. If swelling is present, elevate legs to the level of the heart or above for 30                                                         minutes 4-5 times a day and/or when sitting. When taking antibiotics take entire prescription as ordered by physician                                                         do not stop taking until medicine is all gone.           Wound Care Notes:            Given surgical shoes 11/22/21         Orders for this week: 3/16/23     FAX ORDERS TO [facility]! Rx: [facility] on [address] [facility] in [facility]     Right Great Toe cultured 23     Bilateral lower extremities (leg, feet, and toe wounds) -- wash with soap and water, pat dry. Moisturize intact skin of lower extremities with A+D- slather the product on. Apply Santyl and saline damp Hydrofera blue cut to size of wounds, cover with ABD or Sorbex. Right Medial ankle/leg only - Santyl and Gentamicin and cover with Large Sorbex. Wrap with Coban 2 Lite. Be sure to keep dry. Change 3 times weekly once supplies available       Weekly virtual visits with Skylar Herman CNP on  starting 23 (home care to coordinate visits around 12-1p). Call [phone] for any questions or concerns.   Date__________   Time____________  [place] [phone]

## 2023-03-16 NOTE — PROGRESS NOTES
Nonselective enzymatic debridement performed with Santyl per physician order to wound(s) of the wounds on right and left toes  Patient tolerated the procedure well.

## 2023-03-21 ENCOUNTER — OFFICE VISIT (OUTPATIENT)
Dept: CARDIOLOGY CLINIC | Age: 73
End: 2023-03-21
Payer: MEDICARE

## 2023-03-21 VITALS
HEIGHT: 73 IN | HEART RATE: 68 BPM | WEIGHT: 215 LBS | SYSTOLIC BLOOD PRESSURE: 124 MMHG | OXYGEN SATURATION: 95 % | DIASTOLIC BLOOD PRESSURE: 68 MMHG | BODY MASS INDEX: 28.49 KG/M2

## 2023-03-21 DIAGNOSIS — I50.43 CHF (CONGESTIVE HEART FAILURE), NYHA CLASS I, ACUTE ON CHRONIC, COMBINED (HCC): Primary | ICD-10-CM

## 2023-03-21 DIAGNOSIS — I73.9 PAD (PERIPHERAL ARTERY DISEASE) (HCC): ICD-10-CM

## 2023-03-21 DIAGNOSIS — I10 PRIMARY HYPERTENSION: ICD-10-CM

## 2023-03-21 DIAGNOSIS — Z95.810 BIVENTRICULAR ICD (IMPLANTABLE CARDIOVERTER-DEFIBRILLATOR) IN PLACE: ICD-10-CM

## 2023-03-21 DIAGNOSIS — Z01.810 PRE-OPERATIVE CARDIOVASCULAR EXAMINATION: Primary | ICD-10-CM

## 2023-03-21 PROCEDURE — G8484 FLU IMMUNIZE NO ADMIN: HCPCS | Performed by: NURSE PRACTITIONER

## 2023-03-21 PROCEDURE — G8417 CALC BMI ABV UP PARAM F/U: HCPCS | Performed by: NURSE PRACTITIONER

## 2023-03-21 PROCEDURE — 1036F TOBACCO NON-USER: CPT | Performed by: NURSE PRACTITIONER

## 2023-03-21 PROCEDURE — 3078F DIAST BP <80 MM HG: CPT | Performed by: NURSE PRACTITIONER

## 2023-03-21 PROCEDURE — 1111F DSCHRG MED/CURRENT MED MERGE: CPT | Performed by: NURSE PRACTITIONER

## 2023-03-21 PROCEDURE — 3074F SYST BP LT 130 MM HG: CPT | Performed by: NURSE PRACTITIONER

## 2023-03-21 PROCEDURE — 3017F COLORECTAL CA SCREEN DOC REV: CPT | Performed by: NURSE PRACTITIONER

## 2023-03-21 PROCEDURE — 99214 OFFICE O/P EST MOD 30 MIN: CPT | Performed by: NURSE PRACTITIONER

## 2023-03-21 PROCEDURE — G8427 DOCREV CUR MEDS BY ELIG CLIN: HCPCS | Performed by: NURSE PRACTITIONER

## 2023-03-21 PROCEDURE — 1123F ACP DISCUSS/DSCN MKR DOCD: CPT | Performed by: NURSE PRACTITIONER

## 2023-03-24 ENCOUNTER — HOSPITAL ENCOUNTER (OUTPATIENT)
Dept: WOUND CARE | Age: 73
Discharge: HOME | End: 2023-03-24
Payer: MEDICARE

## 2023-03-24 DIAGNOSIS — E11.42 TYPE 2 DIABETES MELLITUS WITH DIABETIC POLYNEUROPATHY, WITH LONG-TERM CURRENT USE OF INSULIN (HCC): ICD-10-CM

## 2023-03-24 DIAGNOSIS — L97.522 ULCER OF BOTH FEET WITH FAT LAYER EXPOSED (HCC): ICD-10-CM

## 2023-03-24 DIAGNOSIS — E11.621 DIABETIC ULCER OF TOE OF RIGHT FOOT ASSOCIATED WITH TYPE 2 DIABETES MELLITUS, WITH FAT LAYER EXPOSED (HCC): Primary | ICD-10-CM

## 2023-03-24 DIAGNOSIS — Z79.4 TYPE 2 DIABETES MELLITUS WITH DIABETIC POLYNEUROPATHY, WITH LONG-TERM CURRENT USE OF INSULIN (HCC): ICD-10-CM

## 2023-03-24 DIAGNOSIS — E08.42 DIABETIC POLYNEUROPATHY ASSOCIATED WITH DIABETES MELLITUS DUE TO UNDERLYING CONDITION (HCC): ICD-10-CM

## 2023-03-24 DIAGNOSIS — I73.9 PAD (PERIPHERAL ARTERY DISEASE) (HCC): ICD-10-CM

## 2023-03-24 DIAGNOSIS — L97.512 DIABETIC ULCER OF TOE OF RIGHT FOOT ASSOCIATED WITH TYPE 2 DIABETES MELLITUS, WITH FAT LAYER EXPOSED (HCC): Primary | ICD-10-CM

## 2023-03-24 DIAGNOSIS — I73.9 PVD (PERIPHERAL VASCULAR DISEASE) (HCC): ICD-10-CM

## 2023-03-24 DIAGNOSIS — L97.512 ULCER OF BOTH FEET WITH FAT LAYER EXPOSED (HCC): ICD-10-CM

## 2023-03-24 PROCEDURE — 99213 OFFICE O/P EST LOW 20 MIN: CPT | Performed by: NURSE PRACTITIONER

## 2023-03-24 PROCEDURE — 99211 OFF/OP EST MAY X REQ PHY/QHP: CPT

## 2023-03-26 NOTE — PROGRESS NOTES
Sima Sheehan Jessicashaun Lomax   AGE: 67 y.o. GENDER: male  : 1950  EPISODE DATE:  3/24/2023   Referred by:Self    Subjective:     CHIEF COMPLAINT  WOUND MULTIPLE LOWER EXTREMITY WOUNDS  Chief Complaint   Patient presents with    Wound Check     Consent obtained to communicate via Virtual Visit:  Yes     HISTORY of PRESENT ILLNESS      Aileen Pizarro is a 67 y.o. male who presents today via Virtual Visit for wound/ulcer evaluationevaluation and treatment of Chronic venous, arterial, diabetic, and non-healing surgical  ulcer(s) of both feet and lower legs. The condition is of marked severity. The ulcers are chronic in nature with multiple toe amputations. The underlying cause is thought to be . The patients care to date has included local wound care per Home Health. The patient has significant underlying medical conditions as below. Wound Pain Timing/Severity: constant, waxing and waning, severe  Quality of pain: sharp, aching, burning, tender, pressure  Severity of pain: 5 / 10   Modifying Factors: venous stasis, lymphedema, diabetes, decreased mobility, obesity, smoking, arterial insufficiency, and renal impairment  Associated Signs/Symptoms: edema, erythema, drainage, and pain    JALEN: as indicated base on wound location and assessment    Wound infection: wound culture will be obtained as needed for symptoms of infection     Arterial evaluation: if indicated based on wound, location, symptoms and healing    Venous Evaluation: if indicated based on wound, location, symptoms and healing    Diabetes: On an oral regimen  Hemoglobin A1C   Date Value Ref Range Status   10/12/2022 7.1 (H) 4.2 - 6.3 % Final        Diabetes education provided:  Diabetes pathoetiology, difference between type 1 and type 2 diabetes, and progressive nature of Type 2 DM. Metabolic syndrome: association of diabetes with dyslipidemia, HTN and obesity. Diabetic Neuropathy: signs and therapy.   Foot care: advised
the wound(s) is obtained and annotated along with completed description in [address] [place] [address]. []   3     Education Points   No Education completed by nursing staff.    []   0   Patient/caregiver is educated on 1-4 topics. Nursing staff identifies learner, confirms understanding of information (verbal, demonstration, written) and documents details. May include Discharge Instructions/AVS, available documents in My Chart, or Web-based learning.  []   1   Patient/caregiver is educated on 5-9 topics. Nursing staff identifies learner, confirms understanding of information (verbal, demonstration, written) and documents details. May include Discharge Instructions/AVS, available documents in My Chart, or Web-based learning. [x]   2   Patient/caregiver is educated on 10 or more topics. Nursing staff identifies learner, confirms understanding of information (verbal, demonstration, written) and documents details. May include Discharge Instructions/AVS, available documents in My Chart, or Web-based learning. []   3     Follow-up Virtual Visit Points   No contact with outside resources made. 0   Nursing staff contacts 1-2 outside resource. For example, telephone call made to home health, primary care provider, pharmacy, or DME. May include filling out forms and writing letters, arranging transportation, communication with insurance , vendors, etc.  Discharge, instructions and/or After Visit Summary given to patient/caregiver and instructions completed. []   1   Nursing staff contacts 3-4 outside resource. For example, telephone calls made to home health, primary care provider, pharmacy, or DME. May include filling out forms and writing letters, arranging transportation, communication with insurance , vendors, etc.  Discharge, instructions and/or After Visit Summary given to patient/caregiver and instructions completed. []   2   Nursing contacts 5 or more outside resource.  For example, telephone calls made

## 2023-03-29 PROBLEM — M25.511 CHRONIC RIGHT SHOULDER PAIN: Status: ACTIVE | Noted: 2023-01-01

## 2023-03-29 PROBLEM — G89.29 CHRONIC RIGHT SHOULDER PAIN: Status: ACTIVE | Noted: 2023-01-01

## 2023-03-31 ENCOUNTER — HOSPITAL ENCOUNTER (OUTPATIENT)
Dept: WOUND CARE | Age: 73
Discharge: HOME | End: 2023-03-31
Payer: MEDICARE

## 2023-03-31 DIAGNOSIS — L97.512 SKIN ULCER OF RIGHT FOOT INCLUDING TOES WITH FAT LAYER EXPOSED (HCC): ICD-10-CM

## 2023-03-31 DIAGNOSIS — L89.613 DECUBITUS ULCER OF RIGHT HEEL, STAGE 3 (HCC): ICD-10-CM

## 2023-03-31 DIAGNOSIS — G89.4 CHRONIC PAIN SYNDROME: Primary | ICD-10-CM

## 2023-03-31 DIAGNOSIS — L97.522: ICD-10-CM

## 2023-03-31 DIAGNOSIS — E11.42 TYPE 2 DIABETES MELLITUS WITH DIABETIC POLYNEUROPATHY, WITHOUT LONG-TERM CURRENT USE OF INSULIN (HCC): ICD-10-CM

## 2023-03-31 PROBLEM — L97.506 DIABETIC ULCER OF TOE ASSOCIATED WITH TYPE 2 DIABETES MELLITUS, WITH BONE INVOLVEMENT WITHOUT EVIDENCE OF NECROSIS (HCC): Status: RESOLVED | Noted: 2021-06-18 | Resolved: 2023-03-31

## 2023-03-31 PROBLEM — E11.621 DIABETIC ULCER OF TOE ASSOCIATED WITH TYPE 2 DIABETES MELLITUS, WITH BONE INVOLVEMENT WITHOUT EVIDENCE OF NECROSIS (HCC): Status: RESOLVED | Noted: 2021-06-18 | Resolved: 2023-01-01

## 2023-03-31 PROCEDURE — 99211 OFF/OP EST MAY X REQ PHY/QHP: CPT

## 2023-03-31 RX ORDER — OXYCODONE HYDROCHLORIDE AND ACETAMINOPHEN 5; 325 MG/1; MG/1
1 TABLET ORAL NIGHTLY PRN
Qty: 7 TABLET | Refills: 0 | Status: SHIPPED | OUTPATIENT
Start: 2023-03-31 | End: 2023-04-07

## 2023-03-31 NOTE — PROGRESS NOTES
made to home health, primary care providers, pharmacy, or DME. May include filling out forms and writing letters, arranging transportation, communication with insurance , vendors, etc.  Discharge, instructions and/or After Visit Summary given to patient/caregiver and instructions completed.    []   3     Is this the Patient's First Visit to the [address] [place] [address] Road  No    Is this Patient Established @ [facility]  Yes             Virtual Visit Clinical Level of Care Wound Care      Points  1-3  Level 1 [x]     Points  4-5  Level 2 []     Points  6-7  Level 3 []     Points  8-9  Level 4 []     Points  10-11  Level 5 []       Electronically signed by Laura Oneill RN on 3/31/2023 at @NO

## 2023-03-31 NOTE — PROGRESS NOTES
Right;Plantar heel (Active)   Wound Image   03/16/23 1426   Wound Etiology Diabetic 03/16/23 1542   Dressing Status New dressing applied 03/16/23 1542   Wound Cleansed Soap and water 03/16/23 1426   Dressing/Treatment Open to air 03/06/23 0745   Offloading for Diabetic Foot Ulcers Post op shoe; Other (comment) 03/16/23 1542   Dressing Change Due 03/02/23 03/06/23 0745   Wound Length (cm) 4 cm 03/31/23 1019   Wound Width (cm) 6 cm 03/31/23 1019   Wound Depth (cm) 0.1 cm 03/31/23 1019   Wound Surface Area (cm^2) 24 cm^2 03/31/23 1019   Change in Wound Size % (l*w) -1233.33 03/31/23 1019   Wound Volume (cm^3) 2.4 cm^3 03/31/23 1019   Wound Healing % -567 03/31/23 1019   Post-Procedure Length (cm) 0.1 cm 03/16/23 1500   Post-Procedure Width (cm) 0.1 cm 03/16/23 1500   Post-Procedure Depth (cm) 0.1 cm 03/16/23 1500   Post-Procedure Surface Area (cm^2) 0.01 cm^2 03/16/23 1500   Post-Procedure Volume (cm^3) 0.001 cm^3 03/16/23 1500   Distance Tunneling (cm) 0 cm 03/16/23 1426   Tunneling Position ___ O'Clock 0 03/16/23 1426   Undermining Starts ___ O'Clock 0 03/16/23 1426   Undermining Ends___ O'Clock 0 03/16/23 1426   Undermining Maxium Distance (cm) 0 03/16/23 1426   Wound Assessment Dusky 03/16/23 1426   Drainage Amount Scant 03/16/23 1426   Drainage Description Sanguinous 03/16/23 1426   Odor None 03/16/23 1426   Dina-wound Assessment Induration 03/16/23 1426   Margins Attached edges 03/16/23 1426   Wound Thickness Description not for Pressure Injury Partial thickness 03/16/23 1426   Number of days: 46       Wound 02/28/23 Buttocks Left (Active)   Wound Image   03/16/23 1426   Wound Etiology Pressure Stage 2 03/16/23 1542   Dressing Status Clean;Dry; Intact 03/06/23 0745   Wound Cleansed Cleansed with saline 03/06/23 0745   Dressing/Treatment Silicone border;Collagen 03/06/23 0745   Offloading for Diabetic Foot Ulcers Other (comment) 03/16/23 1542   Dressing Change Due 03/03/23 03/06/23 0745   Wound Length (cm) 0.5 cm

## 2023-03-31 NOTE — DISCHARGE INSTRUCTIONS
PHYSICIAN ORDERS AND DISCHARGE INSTRUCTIONS  NOTE: Upon discharge from the [address], you will receive a patient experience survey via E-mail. We would be grateful if you would take the time to fill this survey out. Wound care order history:   JALEN's   Right       Left    Date    Vascular studies/Intervention: . Cultures: . Antibiotics: . HbA1c:  . Compression/Lymph Pumps: [facility] Grafts: Myrla Flurry: . Continuing wound care orders and information:              Residence: . Private              Continue home health care with:. [address] [facility]    Your wound-care supplies will be provided by: [facility]: . Wound cleansing:      Do not scrub or use excessive force. Wash hands with soap and water before and after dressing changes. Prior to applying a clean dressing, cleanse wound with normal saline,    wound cleanser, or mild soap and water. Ask your physician or nurse before getting the wound(s) wet in the shower. Daily Wound management:    Keep weight off wounds and reposition every 2 hours. Avoid standing for long periods of time. Evaluate legs to the level of the heart or above for 30 minutes 4-5 times a day and/or when sitting. When taking antibiotics take entire prescription as ordered by MD do not stop taking until medicine is all gone. Orders for this week (3/31/23): Bilateral lower extremity wounds - Wash with mild soap and water, rinse with saline, pat dry with 4x4  Apply stimulen to wound bed  Cover with saline dampened hydroferbue, and abd  Wrap with coban 2 and secure with tape  Change on Tuesday and Friday     Follow up with [facility] on Friday via Virtual Visit  Call [phone] for any questions or concerns.   Date__________   Time____________

## 2023-04-03 NOTE — TELEPHONE ENCOUNTER
Called to confirm pt's appt for PPW for upcoming procedure and grandchild informed he had passed.  Informed clinical staff

## 2023-04-07 PROBLEM — G89.4 CHRONIC PAIN SYNDROME: Status: ACTIVE | Noted: 2023-04-07

## 2023-11-02 NOTE — H&P
History and Physical      Name:  Daisha Hamilton /Age/Sex: 1950  (70 y.o. male)   MRN & CSN:  [id number] & [id number] Encounter Date/Time: 2022 9:29 PM EDT   [facility] PCP: Lei Kelly Day: 1    Assessment and Plan:   Daisha Hamilton is a 70 y.o. male who presents with     Cellulitis with OM  - vancomycin, flagyl and cefepime given by ED, will continue  - Consider CT scan of right lower extremity if pain/swelling worsens  - follow-up blood cultures  - wound care consult, Sx consulted by ED, ID consult placed  - wound Cx       History of CKD stage IV  -monitor Cr    Type 2 diabetes-   ISS, TIDAC finger stick  Hypoglycemic protocol  F/u A1c    Chronic medical conditions: Home medications resumed was contraindicated  Hypertension  Diabetes mellitus type 2  Sick sinus syndrome status post pacer  PAD  Chronic normocytic anemia       D/w ED provider  Code status Full  DVT PPx Lovenox       History of Present Illness:     Daisha Hamilton is a 70 y.o. male p/w bilateral lower extremity foot wounds, with left foot amputated toes with exposed bone. Patient states that he was sent in by his pain medicine doctor for evaluation by a surgeon. Patient has had no fevers at home, does complain of chronic pain in the feet. Otherwise has no other complaints. Review of Systems: Need 10 Elements       Pt otherwise has no complaints of CP, SOB, dizziness, N/V/C/D, abdominal pain, dysuria, joint pains, cough or fevers. Objective:   No intake or output data in the 24 hours ending 22   Vitals:   Vitals:    22 1733 22 1803 22 1915 22 1945   BP: (!) 199/85 (!) 192/98 (!) 198/79 (!) 206/91   Pulse: 63 71 77 71   Resp:    Temp:       SpO2: 90% 96% 93% 100%       Medications Prior to Admission     Prior to Admission medications    Medication Sig Start Date End Date Taking?  Authorizing Provider   diclofenac sodium (VOLTAREN) 1 % GEL APPLY 4 G TOPICALLY 2 TIMES DAILY 7/11/22   Ariadna Yin MD   amLODIPine (NORVASC) 10 MG tablet TAKE 1 TABLET BY MOUTH EVERY DAY 4/13/22   Ariadna Yin MD   lisinopril (PRINIVIL;ZESTRIL) 5 MG tablet Take 1 tablet by mouth daily 2/3/22 7/21/22  Adriane Hernandez MD   metOLazone (ZAROXOLYN) 2.5 MG tablet Take 1 tablet by mouth daily 2/3/22 7/21/22  Adriane Hernandez MD   torsemide (DEMADEX) 20 MG tablet Take 2 tablets by mouth daily 2/3/22 7/21/22  Adriane Hernandez MD   gabapentin (NEURONTIN) 300 MG capsule TAKE 1 CAPSULE BY MOUTH 3 TIMES DAILY FOR 90 DAYS.  11/15/21 7/21/22  Ana Cheng PA-C   metaxalone (SKELAXIN) 800 MG tablet Take 800 mg by mouth 3 times daily    Historical Provider, MD   Yang Begin 18 MCG inhalation capsule  6/18/21   Historical Provider, MD   insulin lispro, 1 Unit Dial, 100 UNIT/ML SOPN Inject into the skin 2 times daily Sliding scale dose    Historical Provider, MD   atorvastatin (LIPITOR) 40 MG tablet Take 1 tablet by mouth nightly 12/13/20   Mino Ma MD   carvedilol (COREG) 3.125 MG tablet Take 1 tablet by mouth 2 times daily 12/13/20   Mino Ma MD   albuterol sulfate HFA (VENTOLIN HFA) 108 (90 Base) MCG/ACT inhaler Inhale 2 puffs into the lungs every 4 hours as needed for Wheezing 8/14/20   Maria C Martin MD   Calcium Alginate (ALGICELL CALCIUM DRESSING 4\"X8) MISC Apply 1 Device topically daily 3/20/20   Dada Hemphill MD   [facility] COREY WOUND DRESSING MATRIX Apply topically Apply to left daily and cover with gauze 3/19/20   Dada Hemphill MD   clopidogrel (PLAVIX) 75 MG tablet Take 1 tablet by mouth daily 7/18/17   Zaki Dwyer MD       Physical Exam: Need 8 Elements   General: NAD   Eyes: EOMI  ENT: neck supple  Cardiovascular: Regular rate. + edema  Respiratory: Symmetrical expansion,   Gastrointestinal: Soft, non tender  Genitourinary: no suprapubic tenderness  Skin: warm, dry; bilateral foot wounds with ulcers, left foot with exposed bone (Right, 2016); Toe amputation (Right, 2017); Toe amputation (Right, 2018); Cardiac catheterization; Cardiac defibrillator placement (2010); Coronary angioplasty; Cardiac catheterization (2017); Cardiac catheterization (2018); Toe amputation (Left, 2020); and IR TUNNELED CVC PLACE WO SQ PORT/PUMP > 5 YEARS (2021). Allergies: Allergies   Allergen Reactions    Pcn [Penicillins] Hives    Fentanyl Itching     Fam HX:  family history includes Cancer in his brother, father, and son; Diabetes in his brother, mother, and sister; Early Death (age of onset: 62) in his sister; Heart Disease in his brother and sister; High Blood Pressure in his brother, brother, and mother; Neuropathy in his sister; Other in his sister; Stroke in his mother; Vision Loss in his mother. Soc HX:   Social History     Socioeconomic History    Marital status:     Tobacco Use    Smoking status: Former     Packs/day: 0.25     Years: 36.00     Pack years: 9.00     Types: Cigars, Cigarettes     Start date: 1980     Quit date: 10/22/2021     Years since quittin.7    Smokeless tobacco: Never    Tobacco comments:     Client states he has stopped smoking   Vaping Use    Vaping Use: Never used   Substance and Sexual Activity    Alcohol use: No    Drug use: Yes     Types: Marijuana Alisha Salcedo)    Sexual activity: Never       Medications:   Medications:    vancomycin  2,000 mg IntraVENous Once    metroNIDAZOLE  500 mg IntraVENous Q6H    [START ON 2022] cefepime  2,000 mg IntraVENous Q12H    [START ON 2022] vancomycin  1,000 mg IntraVENous Q24H      Infusions:   PRN Meds: morphine, 4 mg, Q4H PRN        Labs      CBC:   Recent Labs     22  1808   WBC 6.0   HGB 10.4*        BMP:    Recent Labs     22  1808      K 4.1   CL 96*   CO2 35*   BUN 26*   CREATININE 1.9*   GLUCOSE 177*     Hepatic:   Recent Labs     22  1808   AST 9*   ALT 6*   BILITOT 0.3   ALKPHOS 105 Lipids:   Lab Results   Component Value Date/Time    CHOL 79 12/11/2020 07:00 AM    HDL 30 12/11/2020 07:00 AM    TRIG 61 12/11/2020 07:00 AM     Hemoglobin A1C:   Lab Results   Component Value Date/Time    LABA1C 8.0 01/29/2022 03:40 AM     TSH: No results found for: TSH  Troponin:   Lab Results   Component Value Date/Time    TROPONINT 0.048 01/23/2022 06:57 AM    TROPONINT 0.034 01/22/2022 11:35 AM    TROPONINT 0.031 01/22/2022 04:00 AM     Lactic Acid: No results for input(s): LACTA in the last 72 hours. BNP: No results for input(s): PROBNP in the last 72 hours. UA:  Lab Results   Component Value Date/Time    NITRU NEGATIVE 01/23/2022 02:25 PM    COLORU YELLOW 01/23/2022 02:25 PM    PHUR 5.0 08/19/2016 09:38 AM    WBCUA 34 01/23/2022 02:25 PM    RBCUA 19 01/23/2022 02:25 PM    MUCUS RARE 01/23/2022 02:25 PM    TRICHOMONAS NONE SEEN 10/29/2021 12:15 PM    BACTERIA NEGATIVE 01/23/2022 02:25 PM    CLARITYU CLEAR 01/23/2022 02:25 PM    SPECGRAV 1.015 01/23/2022 02:25 PM    LEUKOCYTESUR MODERATE 01/23/2022 02:25 PM    UROBILINOGEN 1.0 01/23/2022 02:25 PM    BILIRUBINUR NEGATIVE 01/23/2022 02:25 PM    BLOODU MODERATE 01/23/2022 02:25 PM    Leonora Sima NEGATIVE 01/23/2022 02:25 PM     Urine Cultures: No results found for: Rosita Hint  Blood Cultures: No results found for: BC  No results found for: BLOODCULT2  Organism: No results found for: ORG    Imaging/Diagnostics Last 24 Hours   XR FOOT LEFT (MIN 3 VIEWS)    Result Date: 7/25/2022  EXAMINATION: THREE XRAY VIEWS OF THE LEFT FOOT 7/25/2022 6:26 pm COMPARISON: Left foot radiograph series 10/28/2021 HISTORY: ORDERING SYSTEM PROVIDED HISTORY: diabetic wound 1st/3rd toe TECHNOLOGIST PROVIDED HISTORY: Reason for exam:->diabetic wound 1st/3rd toe Reason for Exam: 1+3 toes ulcer FINDINGS: *There appears to be extensive bone erosion at the head of the middle phalanx left 2nd toe versus prior surgical intervention, new compared to prior study concerning for osteomyelitis.  *No other definite radiographic features of osteomyelitis demonstrated. *Bones are osteoporotic. *Diffuse nonspecific edema about the foot may reflect cellulitis. *No subcuticular gas formation is evident. 1. There appears to be extensive bone erosion at the head of the middle phalanx left 2nd toe versus prior surgical intervention, new compared to prior study concerning for osteomyelitis. 2. No other definite radiographic features of osteomyelitis demonstrated. 3. Bones are osteoporotic. 4. Diffuse nonspecific edema about the foot may reflect cellulitis. XR FOOT LEFT (MIN 3 VIEWS)    Result Date: 7/25/2022  EXAMINATION: THREE XRAY VIEWS OF THE LEFT FOOT 7/21/2022 12:23 pm COMPARISON: Left foot radiograph from October 28, 2021. HISTORY: ORDERING SYSTEM PROVIDED HISTORY: Chronic foot ulcer, left, with necrosis of bone ([address] TECHNOLOGIST PROVIDED HISTORY: Reason for Exam: Chronic foot ulcer, left, with necrosis of bone ([address] Diabetic ulcer of toe of left foot associated with type 2 diabetes mellitus, with fat layer exposed FINDINGS: Stable amputation changes at the level of the 1st proximal phalanx. Large soft tissue ulceration and swelling at the site of 1st toe amputation. Progression of erosive changes of the distal aspect of 2nd middle phalanx likely related to chronic osteomyelitis. No definitive new findings of acute osteomyelitis. Nonspecific diffuse soft tissue swelling. Diffuse osteopenia. No acute fracture or dislocation. 1. Large soft tissue ulceration and swelling at the site of 1st toe amputation. 2. Progression of erosive changes of the distal aspect of the 2nd middle phalanx likely related to chronic osteomyelitis. No definitive new findings of acute osteomyelitis. 3. Nonspecific diffuse soft tissue swelling.      XR FOOT RIGHT (MIN 3 VIEWS)    Result Date: 7/25/2022  EXAMINATION: THREE XRAY VIEWS OF THE RIGHT FOOT 7/25/2022 6:26 pm COMPARISON: Right foot radiograph series 07/21/2022 HISTORY: ORDERING SYSTEM PROVIDED HISTORY: great toe ulcer FINDINGS: Bone density osseous structures of the right foot is diminished. Sequela from prior resection at 3rd and 5th toes. Osseous structures of the foot appear intact and are aligned normally. Joint spaces appear well maintained. Soft tissues are diffusely edematous. No retained radiopaque foreign body. 1. No acute osseous abnormality evident. No definite findings for osteomyelitis. 2. Nonspecific edema about the foot may reflect cellulitis. 3. Bones appear osteoporotic. Follow-up imaging recommended if pain persists or worsens following conservative management. XR FOOT RIGHT (MIN 3 VIEWS)    Result Date: 7/25/2022  EXAMINATION: THREE XRAY VIEWS OF THE RIGHT FOOT 7/21/2022 12:23 pm COMPARISON: Foot radiograph from October 28, 2021 HISTORY: ORDERING SYSTEM PROVIDED HISTORY: Diabetic ulcer of toe of right foot associated with type 2 diabetes mellitus, with fat layer exposed ([address] TECHNOLOGIST PROVIDED HISTORY: Reason for Exam: Diabetic ulcer of toe of right foot associated with type 2 diabetes mellitus, with fat layer exposed FINDINGS: Stable amputation changes at the level of the 3rd and 5th phalanges. There is soft tissue ulceration overlying the 1st toe. No significant underlying erosive or destructive changes. Nonspecific diffuse soft tissue swelling throughout the foot. No radiopaque foreign bodies. No acute fracture or dislocation. Joint spaces are stable. 1. Soft tissue ulceration overlying the 1st toe without definitive underlying osteomyelitis. 2. Nonspecific diffuse soft tissue swelling.            Electronically signed by Tom Croft MD on 7/25/2022 at 9:29 PM Rinvoq Counseling: I discussed with the patient the risks of Rinvoq therapy including but not limited to upper respiratory tract infections, shingles, cold sores, bronchitis, nausea, cough, fever, acne, and headache. Live vaccines should be avoided.  This medication has been linked to serious infections; higher rate of mortality; malignancy and lymphoproliferative disorders; major adverse cardiovascular events; thrombosis; thrombocytopenia, anemia, and neutropenia; lipid elevations; liver enzyme elevations; and gastrointestinal perforations.

## 2023-12-17 NOTE — PROGRESS NOTES
Nephrology Progress Note  10/21/2022 8:24 AM        Subjective:   Admit Date: 10/11/2022  PCP: Annelise Branch    Interval History: Little emotional today, he was fearful yesterday for his current underlying  He was little tearful but then did okay    Diet: Reasonable    ROS: No suicidal homicidal ideation, reported feeling better this morning  Underwent to amputation  No fever, acceptable blood pressure  Urine output dropped to 1200 cc for the last 24 hours    Data:     Current meds:    sodium chloride flush  5-40 mL IntraVENous 2 times per day    aspirin  81 mg Oral Daily    clopidogrel  75 mg Oral Daily    furosemide  80 mg IntraVENous TID    metOLazone  5 mg Oral Daily    magnesium oxide  400 mg Oral BID    carvedilol  6.25 mg Oral BID    hydrALAZINE  25 mg Oral 3 times per day    isosorbide mononitrate  30 mg Oral Daily    lidocaine PF  5 mL IntraDERmal Once    sodium chloride flush  5-40 mL IntraVENous 2 times per day    atorvastatin  40 mg Oral Nightly    [Held by provider] metaxalone  800 mg Oral TID    tiotropium  2 puff Inhalation Daily    sodium chloride flush  5-40 mL IntraVENous 2 times per day    enoxaparin  30 mg SubCUTAneous BID    insulin lispro  0-4 Units SubCUTAneous TID WC    insulin lispro  0-4 Units SubCUTAneous Nightly    collagenase   Topical Daily      sodium chloride 75 mL/hr at 10/20/22 0752    sodium chloride      sodium chloride      sodium chloride      dextrose           I/O last 3 completed shifts:   In: 200 [I.V.:200]  Out: 6870 [Urine:2550; Blood:2]    CBC:   Recent Labs     10/19/22  0649 10/20/22  1020 10/21/22  0742   WBC 5.7 7.0 7.9   HGB 8.7* 8.7* 10.0*    229 275          Recent Labs     10/19/22  0649 10/20/22  1020    139   K 4.8 5.0    100   CO2 31 33*   BUN 53* 58*   CREATININE 2.6* 2.9*   GLUCOSE 134* 114*       Lab Results   Component Value Date    CALCIUM 8.2 (L) 10/20/2022    PHOS 3.5 10/14/2022       Objective:     Vitals: BP (!) 142/86 Pulse 60   Temp 97.4 °F (36.3 °C) (Oral)   Resp 14   Ht 6' 1\" (1.854 m)   Wt 261 lb 11.2 oz (118.7 kg)   SpO2 95%   BMI 34.53 kg/m² ,    General appearance: Alert awake and oriented  HEENT: At least 2+ conjunctival pallor  Neck: Supple  Lungs: Coarse breath sound  Heart: Seems regular rate and rhythm-implanted left chest wall cardiac device  Abdomen: Soft, nontender  Extremities: Edema significantly improved      Problem List :         Impression :     Acute kidney injury with underlying CKD stage G4 A1-since he just had surgery, also not eating and drinking well and has bleeding from surgical site-we will hold the diuretics for today and reevaluate tomorrow  Acute decompensated heart failure with underlying atherosclerotic cardiovascular disease status post intervention  Skin soft tissue bone infection status post amputation  Multifactoral anemia and other chronic disease    Recommendation/Plan  :     Hold IV loop and metolazone for today  Reevaluate him tomorrow and decide  Follow clinically and biochemically and watch for iatrogenic and nosocomial complication  Watch for fluid status      Kuldeep Anderson MD MD [employer]

## 2024-04-01 NOTE — PROGRESS NOTES
S/p TMA   bilateral lower extremities and will need intervention to right lower extremity. Past Medical History:   Diagnosis Date    Acid reflux     Acute MI ([facility] 2004, 2008    Arthritis     Back    Broken teeth     Upper Front    CAD (coronary artery disease)     Sees Dr. Juan Hameed [facility])     per old chart    Cerebral artery occlusion with cerebral infarction [facility])     CHF (congestive heart failure) ([facility]     preserved ejection fraction    Chronic back pain     Chronic kidney disease     Stage IV - patient of Dr oDry Harvey    Diabetes mellitus [facility]) Dx 1965    per old chart pt has been diabetic since age 13    Diabetic neuropathy ([facility]     \"in my feet\"    H/O cardiovascular stress test 08/25/2016    H/O Doppler ultrasound 09/27/2018    Moderate disease of the right lower extremity with an JALEN of 0.72. Moderate to severe disease of the left lower extremity with an JALEN of 0.55.     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    Neuropathy     both feet    PAD (peripheral artery disease) ([facility] 09/27/2018    PVD (peripheral vascular disease) ([facility]     Sick sinus syndrome (HCC)     Sleep apnea     \"sleep study 3 yrs ago- could not tolerate the cpap made me too dry\"    Spinal stenosis     Teeth missing     Upper And Lower    Type 2 diabetes mellitus without complication ([facility]     WD-Chronic foot ulcer, left, with necrosis of bone ([facility] 11/12/2021       Current Outpatient Medications   Medication Sig Dispense Refill    empagliflozin (JARDIANCE) 10 MG tablet Take 1 tablet by mouth daily 90 tablet 5    torsemide 40 MG TABS Take 40 mg by mouth daily

## (undated) DEVICE — SYRINGE MED 10ML LUERLOCK TIP W/O SFTY DISP

## (undated) DEVICE — SYRINGE 20ML LL S/C 50

## (undated) DEVICE — GLOVE SURG SZ 7 L12IN THK7.5MIL DK GRN LTX FREE MSG6570] MEDLINE INDUSTRIES INC]

## (undated) DEVICE — PACK,BASIC,SIRUS,V: Brand: MEDLINE

## (undated) DEVICE — TOWEL,OR,DSP,ST,BLUE,STD,6/PK,12PK/CS: Brand: MEDLINE

## (undated) DEVICE — SUTURE NONABSORBABLE MONOFILAMENT 4-0 FS-2 18 IN ETHILON 662H

## (undated) DEVICE — GAUZE,SPONGE,4"X4",16PLY,XRAY,STRL,LF: Brand: MEDLINE

## (undated) DEVICE — SUTURE VCRL 5-0 L18IN ABSRB UD PC-1 L13MM 3/8 CIR PRIM J834G

## (undated) DEVICE — NEEDLE SPNL 25GA L3.5IN BLU HUB S STL REG WALL FIT STYL W/

## (undated) DEVICE — SUTURE DEKNATEL POLYDEK 4-0 L18IN NONABSORBABLE GRN ME-2 6692

## (undated) DEVICE — AGENT HEMSTAT W4XL4IN OXIDIZED REGENERATED CELOS ABSRB SFT

## (undated) DEVICE — INTENDED FOR TISSUE SEPARATION, AND OTHER PROCEDURES THAT REQUIRE A SHARP SURGICAL BLADE TO PUNCTURE OR CUT.: Brand: BARD-PARKER ® STAINLESS STEEL BLADES

## (undated) DEVICE — SUTURE VCRL SZ 3-0 L27IN ABSRB UD L26MM CT-2 1/2 CIR J232H

## (undated) DEVICE — SUTURE VCRL SZ 3-0 L18IN ABSRB UD L26MM SH 1/2 CIR J864D

## (undated) DEVICE — GLOVE SURG SZ 65 CRM LTX FREE POLYISOPRENE POLYMER BEAD ANTI

## (undated) DEVICE — GLOVE SURG SZ 65 L12IN FNGR THK79MIL GRN LTX FREE

## (undated) DEVICE — GLOVE ORANGE PI 7 1/2   MSG9075

## (undated) DEVICE — BANDAGE,GAUZE,BULKEE II,4.5"X4.1YD,STRL: Brand: MEDLINE

## (undated) DEVICE — GOWN,ECLIPSE,POLYRNF,BRTHSLV,XL,30/CS: Brand: MEDLINE

## (undated) DEVICE — YANKAUER,FLEXIBLE HANDLE,REGLR CAPACITY: Brand: MEDLINE INDUSTRIES, INC.

## (undated) DEVICE — PREMIUM WET SKIN PREP TRAY: Brand: MEDLINE INDUSTRIES, INC.

## (undated) DEVICE — ELECTRODE ES L2.75IN S STL INSUL BLDE W/ SL EDGE

## (undated) DEVICE — TUBING, SUCTION, 9/32" X 10', STRAIGHT: Brand: MEDLINE

## (undated) DEVICE — GOWN,SIRUS,POLYRNF,BRTHSLV,XLN/XL,20/CS: Brand: MEDLINE

## (undated) DEVICE — NEEDLE HYPO 20GA L1.5IN YEL POLYPR HUB S STL REG BVL STR

## (undated) DEVICE — TUBING, SUCTION, 3/16" X 10', STRAIGHT: Brand: MEDLINE

## (undated) DEVICE — SPONGE GZ W4XL8IN COT WVN 12 PLY

## (undated) DEVICE — PACK SET UP

## (undated) DEVICE — MARKER SURG SKIN UTIL REGULAR/FINE 2 TIP W/ RUL AND 9 LBL

## (undated) DEVICE — COUNTER NDL 30 COUNT FOAM STRP SGL MAG

## (undated) DEVICE — GOWN,SURGICAL,AURORA,SLEEVE: Brand: MEDLINE

## (undated) DEVICE — SUTURE PROL SZ 6-0 L18IN NONABSORBABLE BLU L13MM C-1 3/8 8718H

## (undated) DEVICE — DRAPE,EXTREMITY,89X128,STERILE: Brand: MEDLINE

## (undated) DEVICE — GLOVE ORANGE PI 8   MSG9080

## (undated) DEVICE — SOLUTION IV IRRIG POUR BRL 0.9% SODIUM CHL 2F7124

## (undated) DEVICE — TRAY PREP DRY W/ PREM GLV 2 APPL 6 SPNG 2 UNDPD 1 OVERWRAP

## (undated) DEVICE — PAD,ABDOMINAL,5"X9",ST,LF,25/BX: Brand: MEDLINE INDUSTRIES, INC.

## (undated) DEVICE — SYRINGE MED 50ML LUERLOCK TIP

## (undated) DEVICE — LOOP VES W25MM THK1MM MAXI RED SIL FLD REPELLENT 100 PER

## (undated) DEVICE — SUTURE VCRL SZ 2-0 L27IN ABSRB VLT L26MM SH 1/2 CIR J317H

## (undated) DEVICE — DRESSING,GAUZE,XEROFORM,CURAD,1"X8",ST: Brand: CURAD

## (undated) DEVICE — GOWN,ECLIPSE,POLYRNF,BRTHSLV,L,30/CS: Brand: MEDLINE

## (undated) DEVICE — ADHESIVE SKIN CLSR 0.7ML TOP DERMBND ADV

## (undated) DEVICE — SUTURE PERMA-HAND SZ 3-0 L18IN 17 STRND NONABSORBABLE BLK SA64H

## (undated) DEVICE — SUTURE 3-0 VCRL CTD SH-1 J219H

## (undated) DEVICE — CATHETER IV 18GA L1.16IN OD1.27-1.3462MM ID0.9398-1.016MM

## (undated) DEVICE — SHEET,DRAPE,53X77,STERILE: Brand: MEDLINE

## (undated) DEVICE — GLOVE ORANGE PI 7   MSG9070

## (undated) DEVICE — SYRINGE IRRIG 60ML SFT PLIABLE BLB EZ TO GRP 1 HND USE W/

## (undated) DEVICE — PENCIL ES CRD L10FT HND SWCHING ROCK SWCH W/ EDGE COAT BLDE

## (undated) DEVICE — GLOVE SURG SZ 75 CRM LTX FREE POLYISOPRENE POLYMER BEAD ANTI

## (undated) DEVICE — GLOVE SURG SZ 8 L12IN THK75MIL DK GRN LTX FREE

## (undated) DEVICE — SUTURE VCRL SZ 3-0 L27IN ABSRB UD L26MM SH 1/2 CIR J416H

## (undated) DEVICE — SUTURE VCRL SZ 4-0 L27IN ABSRB UD L60MM KS STR REV CUT NDL J662H

## (undated) DEVICE — LOOP,VESSEL,MINI,BLUE,2/PK,STERILE: Brand: MEDLINE

## (undated) DEVICE — DECANTER BAG 9": Brand: MEDLINE INDUSTRIES, INC.

## (undated) DEVICE — CATHETER IV AUTOGRD GRN 18GA 30MM ANGIOCATH

## (undated) DEVICE — ADAPTER,CATHETER/SYRINGE/LUER,STERILE: Brand: MEDLINE

## (undated) DEVICE — SPONGE LAP W18XL18IN WHT COT 4 PLY FLD STRUNG RADPQ DISP ST

## (undated) DEVICE — DECANTER FLD 9IN ST BG FOR ASEP TRNSF OF FLD

## (undated) DEVICE — SUTURE BOOT ASSORTED COLORS XRAY DETECTABLE

## (undated) DEVICE — GLOVE SURG SZ 6 THK91MIL LTX FREE SYN POLYISOPRENE ANTI